# Patient Record
Sex: FEMALE | Race: BLACK OR AFRICAN AMERICAN | NOT HISPANIC OR LATINO | Employment: OTHER | URBAN - METROPOLITAN AREA
[De-identification: names, ages, dates, MRNs, and addresses within clinical notes are randomized per-mention and may not be internally consistent; named-entity substitution may affect disease eponyms.]

---

## 2017-01-13 ENCOUNTER — OFFICE VISIT (OUTPATIENT)
Dept: INTERNAL MEDICINE | Facility: CLINIC | Age: 57
End: 2017-01-13

## 2017-01-13 VITALS
BODY MASS INDEX: 21.44 KG/M2 | HEART RATE: 105 BPM | HEIGHT: 68 IN | RESPIRATION RATE: 24 BRPM | TEMPERATURE: 97.8 F | WEIGHT: 141.5 LBS | DIASTOLIC BLOOD PRESSURE: 80 MMHG | OXYGEN SATURATION: 99 % | SYSTOLIC BLOOD PRESSURE: 138 MMHG

## 2017-01-13 DIAGNOSIS — M54.5 ACUTE MIDLINE LOW BACK PAIN, WITH SCIATICA PRESENCE UNSPECIFIED: Primary | ICD-10-CM

## 2017-01-13 PROCEDURE — 99213 OFFICE O/P EST LOW 20 MIN: CPT | Performed by: PHYSICIAN ASSISTANT

## 2017-01-13 PROCEDURE — 96372 THER/PROPH/DIAG INJ SC/IM: CPT | Performed by: PHYSICIAN ASSISTANT

## 2017-01-13 RX ORDER — LIDOCAINE AND PRILOCAINE 25; 25 MG/G; MG/G
CREAM TOPICAL
COMMUNITY
Start: 2016-12-29 | End: 2019-11-03 | Stop reason: HOSPADM

## 2017-01-13 RX ORDER — KETOROLAC TROMETHAMINE 30 MG/ML
60 INJECTION, SOLUTION INTRAMUSCULAR; INTRAVENOUS ONCE
Status: COMPLETED | OUTPATIENT
Start: 2017-01-13 | End: 2017-01-13

## 2017-01-13 RX ORDER — METHYLPREDNISOLONE 4 MG/1
TABLET ORAL
Qty: 21 TABLET | Refills: 0 | Status: SHIPPED | OUTPATIENT
Start: 2017-01-13 | End: 2019-05-31 | Stop reason: HOSPADM

## 2017-01-13 RX ORDER — TRAMADOL HYDROCHLORIDE 50 MG/1
50 TABLET ORAL EVERY 8 HOURS PRN
Qty: 45 TABLET | Refills: 0 | Status: SHIPPED | OUTPATIENT
Start: 2017-01-13 | End: 2019-05-31 | Stop reason: HOSPADM

## 2017-01-13 RX ORDER — CYCLOBENZAPRINE HCL 10 MG
10 TABLET ORAL 3 TIMES DAILY PRN
Qty: 30 TABLET | Refills: 2 | Status: SHIPPED | OUTPATIENT
Start: 2017-01-13 | End: 2019-05-31 | Stop reason: HOSPADM

## 2017-01-13 RX ADMIN — KETOROLAC TROMETHAMINE 60 MG: 30 INJECTION, SOLUTION INTRAMUSCULAR; INTRAVENOUS at 12:07

## 2017-01-13 NOTE — PROGRESS NOTES
Subjective   Thelma Michael is a 56 y.o. female.   Chief Complaint   Patient presents with   • Back Pain     States she started having pain Tuesday night        History of Present Illness   Several days ago she bent over and heard a pop in her back and after that she cannot straighten up.  Hard to even sit.  SHe prefers to bend forward to avoid the pain.  She has been using flexeril and haven't helped much.  No NSAIDs used.  NO change in bowel or bladder function.    Still abstaining from smoking.    The following portions of the patient's history were reviewed and updated as appropriate: allergies, current medications and problem list.    Review of Systems   Genitourinary: Positive for urgency. Negative for difficulty urinating.   Musculoskeletal: Positive for back pain.   Psychiatric/Behavioral: Positive for sleep disturbance.       Objective   Physical Exam   Constitutional: She appears well-developed and well-nourished.   HENT:   Head: Normocephalic and atraumatic.   Right Ear: External ear normal.   Left Ear: External ear normal.   Eyes: Conjunctivae are normal.   Cardiovascular: Normal rate, regular rhythm and normal heart sounds.  Exam reveals no gallop and no friction rub.    No murmur heard.  Pulmonary/Chest: Effort normal and breath sounds normal.   Psychiatric: She has a normal mood and affect.   Vitals reviewed.    MInimal exam was done d/t to pt's guarding of back at this time.  Assessment/Plan   Thelma was seen today for back pain.    Diagnoses and all orders for this visit:    Acute midline low back pain, with sciatica presence unspecified  -     MethylPREDNISolone (MEDROL, SAMANTHA,) 4 MG tablet; Take as directed on package instructions.  -     ketorolac (TORADOL) injection 60 mg; Inject 60 mg into the shoulder, thigh, or buttocks 1 (One) Time.  -     cyclobenzaprine (FLEXERIL) 10 MG tablet; Take 1 tablet by mouth 3 (Three) Times a Day As Needed for muscle spasms.  -     Ambulatory Referral to Physical  Therapy Evaluate and treat        She can take 2 extra units of lantus at bedtime when on steroid pack.

## 2017-01-13 NOTE — MR AVS SNAPSHOT
Thelma Michael   1/13/2017 11:15 AM   Office Visit    Provider:  JAMESON Phillips   Department:  [facility] MEDIC[facility]   Dept Phone:  [phone]                Your Full Care Plan              Today's Medication Changes          These changes are accurate as of: 1/13/17 12:25 PM.  If you have any questions, ask your nurse or doctor.               New Medication(s)Ordered:     MethylPREDNISolone 4 MG tablet   Commonly known as:  MEDROL (SAMANTHA)   Take as directed on package instructions.   Started by:  JAMESON Phillips       traMADol 50 MG tablet   Commonly known as:  ULTRAM   Take 1 tablet by mouth Every 8 (Eight) Hours As Needed for severe pain (7-10).   Started by:  JAMESON Phillips         Medication(s)that have changed:     esomeprazole 40 MG capsule   Commonly known as:  nexIUM   Take 1 capsule by mouth daily.   What changed:  Another medication with the same name was removed. Continue taking this medication, and follow the directions you see here.   Changed by:  Leif Devlin         Stop taking medication(s)listed here:     PredniSONE 5 MG tablet therapy pack dosepak   Stopped by:  JAMESON Phillips                Where to Get Your Medications      These medications were sent to [facility] [address] [place] [address] [phone]  - [phone] [place] [address]     Phone:  [phone]     cyclobenzaprine 10 MG tablet    MethylPREDNISolone 4 MG tablet         You can get these medications from any pharmacy     Bring a paper prescription for each of these medications     traMADol 50 MG tablet                  Your Updated Medication List          This list is accurate as of: 1/13/17 12:25 PM.  Always use your most recent med list.                amLODIPine 10 MG tablet   Commonly known as:  NORVASC   Take 1 tablet by mouth Daily.       aspirin 325 MG tablet       BD PEN NEEDLE  TOOTIE U/F 32G X 4 MM misc   Generic drug:  Insulin Pen Needle   USE ONE NEEDLE WITH INSULIN 4 TIMES DAILY       CALCIUM PO       cyclobenzaprine 10 MG tablet   Commonly known as:  FLEXERIL   Take 1 tablet by mouth 3 (Three) Times a Day As Needed for muscle spasms.       DEBROX 6.5 % otic solution   Generic drug:  carbamide peroxide       esomeprazole 40 MG capsule   Commonly known as:  nexIUM       FREESTYLE FREEDOM LITE W/DEVICE kit       freestyle lancets       FREESTYLE LITE test strip   Generic drug:  glucose blood       gabapentin 300 MG capsule   Commonly known as:  NEURONTIN   TAKE ONE CAPSULE BY MOUTH ONCE DAILY       glipiZIDE 5 MG ER tablet   Commonly known as:  GLUCOTROL   Take 1 tablet by mouth Daily. as directed; For: Diabetic peripheral neuropathy       GLUCAGON EMERGENCY 1 MG injection   Generic drug:  glucagon       hydrochlorothiazide 12.5 MG capsule   Commonly known as:  MICROZIDE   Take 1 capsule by mouth Daily.       isosorbide mononitrate 60 MG 24 hr tablet   Commonly known as:  IMDUR   Take 1 tablet by mouth Daily.       LANTUS SOLOSTAR 100 UNIT/ML injection pen   Generic drug:  Insulin Glargine       lidocaine-prilocaine 2.5-2.5 % cream   Commonly known as:  EMLA       lisinopril 40 MG tablet   Commonly known as:  PRINIVIL,ZESTRIL   Take 1 tablet by mouth Daily.       MethylPREDNISolone 4 MG tablet   Commonly known as:  MEDROL (SAMANTHA)   Take as directed on package instructions.       metoprolol tartrate 50 MG tablet   Commonly known as:  LOPRESSOR       MUCINEX 600 MG 12 hr tablet   Generic drug:  guaiFENesin       pravastatin 20 MG tablet   Commonly known as:  PRAVACHOL       PRENATAL PO       traMADol 50 MG tablet   Commonly known as:  ULTRAM   Take 1 tablet by mouth Every 8 (Eight) Hours As Needed for severe pain (7-10).       triamcinolone 0.1 % cream   Commonly known as:  KENALOG       VENTOLIN  (90 BASE) MCG/ACT inhaler   Generic drug:  albuterol   INHALE ONE TO TWO PUFFS BY MOUTH  "EVERY 4 TO 6 HOURS AS NEEDED AS DIRECTED       VITAMIN B12 PO       VITAMIN C PO       vitamin D 91515 UNITS capsule capsule   Commonly known as:  ERGOCALCIFEROL   Take 1 capsule by mouth Every 7 (Seven) Days.               We Performed the Following     Ambulatory Referral to Physical Therapy Evaluate and treat       You Were Diagnosed With        Codes Comments    Acute midline low back pain, with sciatica presence unspecified    -  Primary ICD-10-CM: M54.5  ICD-9-CM: 724.2       Medications to be Given to You by a Medical Professional     Due       Frequency    (none) ketorolac (TORADOL) injection 60 mg  Once      Instructions    Take 2 extra units of lantus while on the steroid pack.     Patient Instructions History      [employer] Signup     [facility] [employer] allows you to send messages to your doctor, view your test results, renew your prescriptions, schedule appointments, and more. To sign up, go to [employer] and click on the Sign Up Now link in the New User? box. Enter your [employer] Activation Code exactly as it appears below along with the last four digits of your Social Security Number and your Date of Birth () to complete the sign-up process. If you do not sign up before the expiration date, you must request a new code.    [employer] Activation Code: [email or web address]  Expires: 2017 12:24 PM    If you have questions, you can email [email or web address] or call [phone] to talk to our [employer] staff. Remember, [employer] is NOT to be used for urgent needs. For medical emergencies, dial 911.               Other Info from Your Visit           Allergies     No Known Allergies      Reason for Visit     Back Pain States she started having pain Tuesday night       Vital Signs     Blood Pressure Pulse Temperature Respirations Height Weight    138/80 (BP Location: Left arm, Patient Position: Sitting) 105 97.8 °F (36.6 °C) (Temporal Artery ) 24 68\" (172.7 cm) 141 lb 8 oz (64.2 kg)    " Oxygen Saturation Body Mass Index Smoking Status             99% 21.52 kg/m2 Former Smoker         Problems and Diagnoses Noted     Back pain      Medications Administered     ketorolac (TORADOL) injection 60 mg

## 2017-05-22 RX ORDER — BLOOD-GLUCOSE METER
KIT MISCELLANEOUS
Qty: 100 EACH | Refills: 0 | Status: SHIPPED | OUTPATIENT
Start: 2017-05-22 | End: 2019-06-07 | Stop reason: SDUPTHER

## 2017-05-22 RX ORDER — ERGOCALCIFEROL 1.25 MG/1
CAPSULE ORAL
Qty: 12 CAPSULE | Refills: 0 | Status: SHIPPED | OUTPATIENT
Start: 2017-05-22 | End: 2019-05-31 | Stop reason: HOSPADM

## 2017-05-22 RX ORDER — LANCETS 28 GAUGE
EACH MISCELLANEOUS
Qty: 100 EACH | Refills: 0 | Status: SHIPPED | OUTPATIENT
Start: 2017-05-22 | End: 2019-06-21

## 2017-11-12 ENCOUNTER — HOSPITAL ENCOUNTER (EMERGENCY)
Facility: HOSPITAL | Age: 57
Discharge: HOME | End: 2017-11-12
Attending: EMERGENCY MEDICINE | Admitting: EMERGENCY MEDICINE

## 2017-11-12 VITALS
SYSTOLIC BLOOD PRESSURE: 157 MMHG | WEIGHT: 134 LBS | DIASTOLIC BLOOD PRESSURE: 82 MMHG | BODY MASS INDEX: 20.31 KG/M2 | HEART RATE: 71 BPM | TEMPERATURE: 98.3 F | HEIGHT: 68 IN | RESPIRATION RATE: 16 BRPM | OXYGEN SATURATION: 99 %

## 2017-11-12 DIAGNOSIS — R11.2 NON-INTRACTABLE VOMITING WITH NAUSEA, UNSPECIFIED VOMITING TYPE: ICD-10-CM

## 2017-11-12 DIAGNOSIS — Z79.4 TYPE 2 DIABETES MELLITUS WITH HYPERGLYCEMIA, WITH LONG-TERM CURRENT USE OF INSULIN (HCC): Primary | ICD-10-CM

## 2017-11-12 DIAGNOSIS — E11.65 TYPE 2 DIABETES MELLITUS WITH HYPERGLYCEMIA, WITH LONG-TERM CURRENT USE OF INSULIN (HCC): Primary | ICD-10-CM

## 2017-11-12 DIAGNOSIS — I10 UNCONTROLLED HYPERTENSION: ICD-10-CM

## 2017-11-12 LAB
ANION GAP SERPL CALCULATED.3IONS-SCNC: 5 MMOL/L (ref 3–11)
BASOPHILS # BLD AUTO: 0.01 10*3/MM3 (ref 0–0.2)
BASOPHILS NFR BLD AUTO: 0.2 % (ref 0–1)
BILIRUB UR QL STRIP: NEGATIVE
BUN BLD-MCNC: 12 MG/DL (ref 9–23)
BUN/CREAT SERPL: 13.3 (ref 7–25)
CALCIUM SPEC-SCNC: 9.3 MG/DL (ref 8.7–10.4)
CHLORIDE SERPL-SCNC: 106 MMOL/L (ref 99–109)
CLARITY UR: CLEAR
CO2 SERPL-SCNC: 28 MMOL/L (ref 20–31)
COLOR UR: YELLOW
CREAT BLD-MCNC: 0.9 MG/DL (ref 0.6–1.3)
DEPRECATED RDW RBC AUTO: 43.2 FL (ref 37–54)
EOSINOPHIL # BLD AUTO: 0.05 10*3/MM3 (ref 0–0.3)
EOSINOPHIL NFR BLD AUTO: 0.8 % (ref 0–3)
ERYTHROCYTE [DISTWIDTH] IN BLOOD BY AUTOMATED COUNT: 13.1 % (ref 11.3–14.5)
GFR SERPL CREATININE-BSD FRML MDRD: 78 ML/MIN/1.73
GLUCOSE BLD-MCNC: 409 MG/DL (ref 70–100)
GLUCOSE BLDC GLUCOMTR-MCNC: 417 MG/DL (ref 70–130)
GLUCOSE BLDC GLUCOMTR-MCNC: 75 MG/DL (ref 70–130)
GLUCOSE UR STRIP-MCNC: ABNORMAL MG/DL
HBA1C MFR BLD: 10.5 % (ref 4.8–5.6)
HCT VFR BLD AUTO: 38.6 % (ref 34.5–44)
HGB BLD-MCNC: 13.6 G/DL (ref 11.5–15.5)
HGB UR QL STRIP.AUTO: NEGATIVE
IMM GRANULOCYTES # BLD: 0.01 10*3/MM3 (ref 0–0.03)
IMM GRANULOCYTES NFR BLD: 0.2 % (ref 0–0.6)
KETONES UR QL STRIP: NEGATIVE
LEUKOCYTE ESTERASE UR QL STRIP.AUTO: NEGATIVE
LYMPHOCYTES # BLD AUTO: 2.01 10*3/MM3 (ref 0.6–4.8)
LYMPHOCYTES NFR BLD AUTO: 31.9 % (ref 24–44)
MCH RBC QN AUTO: 31.8 PG (ref 27–31)
MCHC RBC AUTO-ENTMCNC: 35.2 G/DL (ref 32–36)
MCV RBC AUTO: 90.2 FL (ref 80–99)
MONOCYTES # BLD AUTO: 0.36 10*3/MM3 (ref 0–1)
MONOCYTES NFR BLD AUTO: 5.7 % (ref 0–12)
NEUTROPHILS # BLD AUTO: 3.87 10*3/MM3 (ref 1.5–8.3)
NEUTROPHILS NFR BLD AUTO: 61.2 % (ref 41–71)
NITRITE UR QL STRIP: NEGATIVE
PH UR STRIP.AUTO: 8 [PH] (ref 5–8)
PLATELET # BLD AUTO: 261 10*3/MM3 (ref 150–450)
PMV BLD AUTO: 11.2 FL (ref 6–12)
POTASSIUM BLD-SCNC: 4.1 MMOL/L (ref 3.5–5.5)
PROT UR QL STRIP: NEGATIVE
RBC # BLD AUTO: 4.28 10*6/MM3 (ref 3.89–5.14)
SODIUM BLD-SCNC: 139 MMOL/L (ref 132–146)
SP GR UR STRIP: 1.02 (ref 1–1.03)
T4 FREE SERPL-MCNC: 1.13 NG/DL (ref 0.89–1.76)
TROPONIN I SERPL-MCNC: 0 NG/ML (ref 0–0.07)
TSH SERPL DL<=0.05 MIU/L-ACNC: 0.98 MIU/ML (ref 0.35–5.35)
UROBILINOGEN UR QL STRIP: ABNORMAL
WBC NRBC COR # BLD: 6.31 10*3/MM3 (ref 3.5–10.8)

## 2017-11-12 PROCEDURE — 80048 BASIC METABOLIC PNL TOTAL CA: CPT | Performed by: EMERGENCY MEDICINE

## 2017-11-12 PROCEDURE — 96374 THER/PROPH/DIAG INJ IV PUSH: CPT

## 2017-11-12 PROCEDURE — 84443 ASSAY THYROID STIM HORMONE: CPT | Performed by: EMERGENCY MEDICINE

## 2017-11-12 PROCEDURE — 96361 HYDRATE IV INFUSION ADD-ON: CPT

## 2017-11-12 PROCEDURE — 99284 EMERGENCY DEPT VISIT MOD MDM: CPT

## 2017-11-12 PROCEDURE — 83036 HEMOGLOBIN GLYCOSYLATED A1C: CPT | Performed by: EMERGENCY MEDICINE

## 2017-11-12 PROCEDURE — 84484 ASSAY OF TROPONIN QUANT: CPT

## 2017-11-12 PROCEDURE — 93005 ELECTROCARDIOGRAM TRACING: CPT | Performed by: EMERGENCY MEDICINE

## 2017-11-12 PROCEDURE — 85025 COMPLETE CBC W/AUTO DIFF WBC: CPT | Performed by: EMERGENCY MEDICINE

## 2017-11-12 PROCEDURE — 82962 GLUCOSE BLOOD TEST: CPT

## 2017-11-12 PROCEDURE — 81003 URINALYSIS AUTO W/O SCOPE: CPT | Performed by: EMERGENCY MEDICINE

## 2017-11-12 PROCEDURE — 63710000001 INSULIN REGULAR HUMAN PER 5 UNITS: Performed by: EMERGENCY MEDICINE

## 2017-11-12 PROCEDURE — 84439 ASSAY OF FREE THYROXINE: CPT | Performed by: EMERGENCY MEDICINE

## 2017-11-12 RX ORDER — ONDANSETRON 4 MG/1
4 TABLET, ORALLY DISINTEGRATING ORAL EVERY 6 HOURS PRN
Qty: 15 TABLET | Refills: 0 | Status: SHIPPED | OUTPATIENT
Start: 2017-11-12 | End: 2019-05-31 | Stop reason: HOSPADM

## 2017-11-12 RX ORDER — DILTIAZEM HYDROCHLORIDE 5 MG/ML
10 INJECTION INTRAVENOUS ONCE
Status: COMPLETED | OUTPATIENT
Start: 2017-11-12 | End: 2017-11-12

## 2017-11-12 RX ADMIN — DILTIAZEM HYDROCHLORIDE 10 MG: 5 INJECTION INTRAVENOUS at 16:14

## 2017-11-12 RX ADMIN — SODIUM CHLORIDE 1000 ML: 9 INJECTION, SOLUTION INTRAVENOUS at 14:54

## 2017-11-12 RX ADMIN — INSULIN HUMAN 6 UNITS: 100 INJECTION, SOLUTION PARENTERAL at 15:12

## 2017-11-12 NOTE — ED PROVIDER NOTES
Subjective   HPI Comments: Pt presents with hyperglycemia.  She has been feeling unwell for about 2 weeks, with malaise, frequent urination and mild intermittent nausea and vomiting.  She says during this time she was checking her sugars as normal and they did not go above the 140s.  Today after lunch she developed fatigue, headache, left arm heaviness and had some N/V and checked her sugar and found it to be much higher.  Her BP is higher than usual for her as well.    She denies fever, chest pain, dyspnea, abd pain, numbness, tingling, weakness.  She reports compliance with her insulin regimen and diet, both of which are longstanding and unchanged.    Patient is a 56 y.o. female presenting with hyperglycemia.   History provided by:  Patient  Hyperglycemia   Blood sugar level PTA:  400s  Severity:  Moderate  Onset quality:  Gradual  Timing:  Constant  Progression:  Unchanged  Chronicity:  New  Diabetes status:  Controlled with insulin  Current diabetic therapy:  Insulin  Context: recent illness    Context: not change in medication, not insulin pump use, not new diabetes diagnosis, not noncompliance and not recent change in diet    Relieved by:  Nothing  Ineffective treatments:  None tried  Associated symptoms: nausea, polyuria and vomiting    Associated symptoms: no abdominal pain, no chest pain, no confusion, no dizziness, no dysuria, no fever and no shortness of breath        Review of Systems   Constitutional: Negative for fever.   Respiratory: Negative for shortness of breath.    Cardiovascular: Negative for chest pain.   Gastrointestinal: Positive for nausea and vomiting. Negative for abdominal pain.   Endocrine: Positive for polyuria.   Genitourinary: Negative for dysuria.   Neurological: Negative for dizziness.   Psychiatric/Behavioral: Negative for confusion.   All other systems reviewed and are negative.      Past Medical History:   Diagnosis Date   • Acid reflux    • Acute bronchitis    • Cardiac murmur     • H/O echocardiogram 08/07/2012    i. LVEF 65%.ii. Mild LVH.iii. Borderline evidence of atrial septal aneurysm.  No PFO.    • History of nuclear stress test 08/22/2014    Negative for ischemia and scars; LVEF 77%.     • Hyperlipidemia    • Hypertension    • Impacted cerumen of both ears    • Migraine    • Sinusitis    • Tobacco abuse     quit 4 days ago.     • Urticaria        No Known Allergies    Past Surgical History:   Procedure Laterality Date   • NO PAST SURGERIES         Family History   Problem Relation Age of Onset   • Anxiety disorder Other    • Arthritis Other    • ADD / ADHD Other    • Heart disease Other      cardiac disorder   • Depression Other    • Diabetes Other    • Hyperlipidemia Other    • Hypertension Other    • Lung cancer Other    • Osteoporosis Other        Social History     Social History   • Marital status:      Spouse name: N/A   • Number of children: N/A   • Years of education: N/A     Social History Main Topics   • Smoking status: Former Smoker     Quit date: 1/13/2015   • Smokeless tobacco: Never Used      Comment: [facility] never smoker    • Alcohol use 0.6 oz/week     1 Glasses of wine per week      Comment: ocassional   • Drug use: None   • Sexual activity: Not Asked      Comment: Single      Other Topics Concern   • None     Social History Narrative           Objective   Physical Exam   Constitutional: She appears well-developed and well-nourished. She is cooperative.  Non-toxic appearance. No distress.   HENT:   Head: Normocephalic and atraumatic.   Mouth/Throat: Oropharynx is clear and moist and mucous membranes are normal.   Eyes: Conjunctivae and EOM are normal. No scleral icterus.   Neck: Normal range of motion. Neck supple.   Cardiovascular: Normal rate, regular rhythm and normal heart sounds.    No murmur heard.  Pulmonary/Chest: Effort normal and breath sounds normal. No respiratory distress. She has no wheezes. She has no rhonchi. She has no rales.   Abdominal: Soft.  Bowel sounds are normal. There is no tenderness. There is no rebound and no guarding.   Musculoskeletal: Normal range of motion.   Neurological: She is alert. She has normal strength.   Skin: Skin is warm and dry. No rash noted.   Psychiatric: She has a normal mood and affect. Her speech is normal and behavior is normal.   Nursing note and vitals reviewed.      Procedures         ED Course  ED Course      BP, sugar improved in the ED.  Pt feels better with meds.  Patient stable on serial rechecks.  Discussed findings, concerns, plan of care, expected course, reasons to return and followup.              MDM  Number of Diagnoses or Management Options      Final diagnoses:   Type 2 diabetes mellitus with hyperglycemia, with long-term current use of insulin   Uncontrolled hypertension   Non-intractable vomiting with nausea, unspecified vomiting type            Jimmie Urbina MD  11/16/17 1002

## 2018-01-23 ENCOUNTER — TELEPHONE (OUTPATIENT)
Dept: INTERNAL MEDICINE | Facility: CLINIC | Age: 58
End: 2018-01-23

## 2018-01-23 NOTE — TELEPHONE ENCOUNTER
LMOV @ 9:15 TO RETURN CALL. SHE CAN ESTABLISH CARE WITH DALI QUIROS. SHE WILL NEED TO BE SEEN FIRST BEFORE WE CAN RX MEDICATIONS. NO SHOW POLICY NEEDS TO BE REVIEWED WITH HER AS WELL

## 2018-01-23 NOTE — TELEPHONE ENCOUNTER
PATIENT WOULD LIKE REFILLS ON ALL MAINTENANCE MEDICATIONS, PATIENT HAS BEEN VERBALLY TOLD THAT SHE WILL NOT BE PRESCRIBED IN CONTROLLED SUBSTANCES. PATIENT WOULD LIKE A CALL BACK AS SOON AS POSSIBLE. PATIENT SAYS SHE HAS NOT BEEN SEEN FOR A WHILE DUE TO NO INSURANCE COVERAGE. THANK YOU      PATIENT DOES NOT HAVE NEW PCP SET UP BUT IS WAITING UNTIL A NEW PROVIDER IN [facility] IS COMING.

## 2018-11-14 ENCOUNTER — HOSPITAL ENCOUNTER (EMERGENCY)
Facility: HOSPITAL | Age: 58
Discharge: HOME | End: 2018-11-14
Attending: EMERGENCY MEDICINE | Admitting: EMERGENCY MEDICINE

## 2018-11-14 ENCOUNTER — APPOINTMENT (OUTPATIENT)
Dept: GENERAL RADIOLOGY | Facility: HOSPITAL | Age: 58
End: 2018-11-14

## 2018-11-14 VITALS
DIASTOLIC BLOOD PRESSURE: 91 MMHG | BODY MASS INDEX: 18.79 KG/M2 | TEMPERATURE: 98.6 F | SYSTOLIC BLOOD PRESSURE: 190 MMHG | RESPIRATION RATE: 18 BRPM | WEIGHT: 124 LBS | HEIGHT: 68 IN | OXYGEN SATURATION: 97 % | HEART RATE: 74 BPM
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
## 2018-11-14 DIAGNOSIS — I10 CHRONIC HYPERTENSION: ICD-10-CM

## 2018-11-14 DIAGNOSIS — S70.02XA CONTUSION OF LEFT HIP, INITIAL ENCOUNTER: ICD-10-CM

## 2018-11-14 DIAGNOSIS — I16.0 HYPERTENSIVE URGENCY: ICD-10-CM

## 2018-11-14 PROCEDURE — 73502 X-RAY EXAM HIP UNI 2-3 VIEWS: CPT

## 2018-11-14 PROCEDURE — 99283 EMERGENCY DEPT VISIT LOW MDM: CPT

## 2018-11-14 RX ORDER — LISINOPRIL 10 MG/1
10 TABLET ORAL DAILY
Qty: 30 TABLET | Refills: 0 | Status: SHIPPED | OUTPATIENT
Start: 2018-11-14 | End: 2018-11-23 | Stop reason: SDUPTHER

## 2018-11-14 RX ORDER — CLONIDINE HYDROCHLORIDE 0.1 MG/1
0.1 TABLET ORAL ONCE
Status: COMPLETED | OUTPATIENT
Start: 2018-11-14 | End: 2018-11-14

## 2018-11-14 RX ADMIN — CLONIDINE HYDROCHLORIDE 0.1 MG: 0.1 TABLET ORAL at 18:47

## 2018-11-14 NOTE — ED PROVIDER NOTES
Subjective   Ms. Thelma Michael is a 57 y.o. female who presents to the ED with c/o L hip pain s/p fall 2 days ago. Pt reports 2 days she tripped up stairs walking into her home and fell with point of impact being L hip and L lateral thigh pain. Since injury the pain has remained moderate but is constant and exacerbates with ambulation. 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 She denies decreased ROM, LOC, head injury, back pain, abd pain, and any other acute sx at this time.        History provided by:  Patient  Fall   Mechanism of injury: fall    Injury location:  Leg  Leg injury location:  L upper leg and L hip  Incident location:  Home  Time since incident:  2 days  Fall:     Fall occurred:  Tripped    Point of impact: L hip, L lateral thigh   Prior to arrival data:     Loss of consciousness: no    Associated symptoms: no abdominal pain, no back pain and no loss of consciousness        Review of Systems   Gastrointestinal: Negative for abdominal pain.   Musculoskeletal: Positive for arthralgias (L hip, L lateral thigh). Negative for back pain and gait problem.   Neurological: Negative for loss of consciousness.   All other systems reviewed and are negative.      Past Medical History:   Diagnosis Date   • Acid reflux    • Acute bronchitis    • Cardiac murmur    • Diabetes mellitus (CMS/HCC)    • H/O echocardiogram 08/07/2012    i. LVEF 65%.ii. Mild LVH.iii. Borderline evidence of atrial septal aneurysm.  No PFO.    • History of nuclear stress test 08/22/2014    Negative for ischemia and scars; LVEF 77%.     • Hyperlipidemia    • Hypertension    • Impacted cerumen of both ears    • Migraine    • Sinusitis    • Tobacco abuse     quit 4 days ago.     • Urticaria        No Known Allergies    Past Surgical History:   Procedure Laterality Date   • NO PAST SURGERIES         Family History   Problem Relation Age of Onset   • Anxiety disorder Other    • Arthritis Other    • ADD  / ADHD Other    • Heart disease Other         cardiac disorder   • Depression Other    • Diabetes Other    • Hyperlipidemia Other    • Hypertension Other    • Lung cancer Other    • Osteoporosis Other        Social History     Socioeconomic History   • Marital status:      Spouse name: Not on file   • Number of children: Not on file   • Years of education: Not on file   • Highest education level: Not on file   Tobacco Use   • Smoking status: Former Smoker     Last attempt to quit: 1/13/2015     Years since quitting: 3.8   • Smokeless tobacco: Never Used   • Tobacco comment: [facility] never smoker    Substance and Sexual Activity   • Alcohol use: Yes     Alcohol/week: 0.6 oz     Types: 1 Glasses of wine per week     Comment: ocassional         Objective   Physical Exam   Constitutional: She is oriented to person, place, and time. She appears well-developed and well-nourished. No distress.   HENT:   Head: Normocephalic and atraumatic.   Right Ear: External ear normal.   Left Ear: External ear normal.   Nose: Nose normal.   Eyes: Conjunctivae are normal. No scleral icterus.   Neck: Normal range of motion. Neck supple.   Pulmonary/Chest: Effort normal. No respiratory distress.   Musculoskeletal: Normal range of motion. She exhibits no edema.        Left hip: She exhibits normal range of motion, normal strength and no swelling.        Left knee: She exhibits normal range of motion. No tenderness found.        Left upper leg: She exhibits tenderness. She exhibits no swelling.   Tenderness to palpation of anterior, superior iliac spine. Full ROM of L hip with no pain. Mild tenderness to palpation of lateral aspect of mid-left thigh. No contusions or swelling appreciated. Pt is fully ambulatory. No concern for femur fracture or dislocation. No tenderness to palpation of L knee   Neurological: She is alert and oriented to person, place, and time.   Neurovascularly intact   Skin: Skin is warm and dry.   Psychiatric: She  "has a normal mood and affect. Her behavior is normal.   Nursing note and vitals reviewed.      Procedures         ED Course  ED Course as of Nov 14 2334   Wed Nov 14, 2018   1753 Pt states that she is working on getting insurance coverage through [employer].  She also states that she ran out of her blood pressure medications several months ago, which we are incidentally seeing today.  As patient was on multiple hypertensive medications in the past I will restart her on her lisinopril.  I have instructed her that she will likely need more aggressive management but this is most appropriately done through primary care office.  She states that she does have a primary care physician and is confident that she will have her well care reinstated and she will follow up promptly with them.  [CP]      ED Course User Index  [CP] Abisai Pierson, DO     No results found for this or any previous visit (from the past 24 hour(s)).  Note: In addition to lab results from this visit, the labs listed above may include labs taken at another facility or during a different encounter within the last 24 hours. Please correlate lab times with ED admission and discharge times for further clarification of the services performed during this visit.    XR Hip With or Without Pelvis 2 - 3 View Left   Final Result      Unremarkable radiographic series of the left hip.      THIS DOCUMENT HAS BEEN ELECTRONICALLY SIGNED BY CHANCE DUTTON MD        Vitals:    11/14/18 1642 11/14/18 1847 11/14/18 1921   BP: (!) 207/96 (!) 207/96 (!) 190/91   BP Location: Left arm     Patient Position: Sitting  Sitting   Pulse: 81 81 74   Resp: 18  18   Temp: 98.6 °F (37 °C)     TempSrc: Oral     SpO2: 99%  97%   Weight: 56.2 kg (124 lb)     Height: 172.7 cm (68\")       Medications   CloNIDine (CATAPRES) tablet 0.1 mg (0.1 mg Oral Given 11/14/18 1847)     ECG/EMG Results (last 24 hours)     ** No results found for the last 24 hours. **                        MDM    Final " diagnoses:   Fall on stairs, initial encounter   Contusion of left hip, initial encounter   Chronic hypertension   Hypertensive urgency       Documentation assistance provided by mylene Em.  Information recorded by the scribe was done at my direction and has been verified and validated by me.     Twin Em  11/14/18 1344       Abisai Pierson DO  11/14/18 3892

## 2018-11-15 NOTE — DISCHARGE INSTRUCTIONS
Given your history and currently hypertension, it is extremely important to establish care with her primary care physician.

## 2018-11-23 ENCOUNTER — HOSPITAL ENCOUNTER (EMERGENCY)
Facility: HOSPITAL | Age: 58
Discharge: HOME | End: 2018-11-23
Attending: EMERGENCY MEDICINE | Admitting: EMERGENCY MEDICINE

## 2018-11-23 VITALS
WEIGHT: 134 LBS | BODY MASS INDEX: 20.31 KG/M2 | HEIGHT: 68 IN | DIASTOLIC BLOOD PRESSURE: 104 MMHG | OXYGEN SATURATION: 98 % | HEART RATE: 70 BPM | TEMPERATURE: 97.6 F | SYSTOLIC BLOOD PRESSURE: 182 MMHG | RESPIRATION RATE: 16 BRPM

## 2018-11-23 DIAGNOSIS — I10 UNCONTROLLED HYPERTENSION: Primary | ICD-10-CM

## 2018-11-23 LAB
BUN BLDA-MCNC: 12 MG/DL (ref 8–26)
CA-I BLDA-SCNC: 0.95 MMOL/L (ref 1.2–1.32)
CHLORIDE BLDA-SCNC: 109 MMOL/L (ref 98–109)
CO2 BLDA-SCNC: 19 MMOL/L (ref 24–29)
CREAT BLDA-MCNC: 0.4 MG/DL (ref 0.6–1.3)
GLUCOSE BLDC GLUCOMTR-MCNC: 321 MG/DL (ref 70–130)
HCT VFR BLDA CALC: 26 % (ref 38–51)
HGB BLDA-MCNC: 8.8 G/DL (ref 12–17)
POTASSIUM BLDA-SCNC: 2.6 MMOL/L (ref 3.5–4.9)
SODIUM BLDA-SCNC: 143 MMOL/L (ref 138–146)

## 2018-11-23 PROCEDURE — 85014 HEMATOCRIT: CPT

## 2018-11-23 PROCEDURE — 99284 EMERGENCY DEPT VISIT MOD MDM: CPT

## 2018-11-23 PROCEDURE — 80047 BASIC METABLC PNL IONIZED CA: CPT

## 2018-11-23 RX ORDER — METOPROLOL TARTRATE 50 MG/1
50 TABLET, FILM COATED ORAL ONCE
Status: COMPLETED | OUTPATIENT
Start: 2018-11-23 | End: 2018-11-23

## 2018-11-23 RX ORDER — LISINOPRIL 10 MG/1
10 TABLET ORAL DAILY
Qty: 30 TABLET | Refills: 0 | Status: SHIPPED | OUTPATIENT
Start: 2018-11-23 | End: 2019-06-07 | Stop reason: SDUPTHER

## 2018-11-23 RX ORDER — METOPROLOL TARTRATE 50 MG/1
50 TABLET, FILM COATED ORAL 2 TIMES DAILY
Qty: 60 TABLET | Refills: 0 | Status: SHIPPED | OUTPATIENT
Start: 2018-11-23 | End: 2019-05-31 | Stop reason: HOSPADM

## 2018-11-23 RX ORDER — LISINOPRIL 10 MG/1
20 TABLET ORAL ONCE
Status: COMPLETED | OUTPATIENT
Start: 2018-11-23 | End: 2018-11-23

## 2018-11-23 RX ORDER — AMLODIPINE BESYLATE 10 MG/1
10 TABLET ORAL DAILY
Qty: 30 TABLET | Refills: 0 | Status: SHIPPED | OUTPATIENT
Start: 2018-11-23 | End: 2019-06-17 | Stop reason: SINTOL

## 2018-11-23 RX ADMIN — LISINOPRIL 20 MG: 10 TABLET ORAL at 22:40

## 2018-11-23 RX ADMIN — METOPROLOL TARTRATE 50 MG: 50 TABLET ORAL at 22:41

## 2018-11-24 NOTE — ED PROVIDER NOTES
Subjective   Thelma Michael is a 57 y.o.female who presents to the emergency department with complaints of elevated blood pressure readings and recurrent headaches over the last couple of weeks. The patient has been diagnosed with hypertension but states that she has not been able to afford her medications, including several blood pressure medications, due to problems with insurance and has not taken any of them in several months. She has, however, continued to monitor her blood pressure. The patient was prescribed Lisinopril here in the emergency department on 11/14 although she states that she has not been able to fill the prescription yet. She came here for additional evaluation due to persistent symptoms. She denies experiencing any numbness, tingling, weakness, or visual changes. There are no other acute complaints at this time.        History provided by:  Patient  Hypertension   Severity:  Unable to specify  Onset quality:  Unable to specify  Timing:  Constant  Progression:  Unchanged  Chronicity:  Chronic  Time since last dose of antihypertensive: several months.  Context: noncompliance    Relieved by:  None tried  Worsened by:  Nothing  Ineffective treatments:  None tried  Associated symptoms: headaches    Associated symptoms: no blurred vision and no weakness        Review of Systems   Eyes: Negative for blurred vision and visual disturbance.   Neurological: Positive for headaches. Negative for weakness and numbness.   All other systems reviewed and are negative.      Past Medical History:   Diagnosis Date   • Acid reflux    • Acute bronchitis    • Cardiac murmur    • Diabetes mellitus (CMS/[facility])    • H/O echocardiogram 08/07/2012    i. LVEF 65%.ii. Mild LVH.iii. Borderline evidence of atrial septal aneurysm.  No PFO.    • History of nuclear stress test 08/22/2014    Negative for ischemia and scars; LVEF 77%.     • Hyperlipidemia    • Hypertension    • Impacted cerumen of both ears    • Migraine    •  Sinusitis    • Tobacco abuse     quit 4 days ago.     • Urticaria        No Known Allergies    Past Surgical History:   Procedure Laterality Date   • DENTAL PROCEDURE     • NO PAST SURGERIES         Family History   Problem Relation Age of Onset   • Anxiety disorder Other    • Arthritis Other    • ADD / ADHD Other    • Heart disease Other         cardiac disorder   • Depression Other    • Diabetes Other    • Hyperlipidemia Other    • Hypertension Other    • Lung cancer Other    • Osteoporosis Other        Social History     Socioeconomic History   • Marital status:      Spouse name: Not on file   • Number of children: Not on file   • Years of education: Not on file   • Highest education level: Not on file   Tobacco Use   • Smoking status: Former Smoker     Last attempt to quit: 1/13/2015     Years since quitting: 3.8   • Smokeless tobacco: Never Used   • Tobacco comment: [facility] never smoker    Substance and Sexual Activity   • Alcohol use: Yes     Alcohol/week: 0.6 oz     Types: 1 Glasses of wine per week     Comment: ocassional         Objective   Physical Exam   Constitutional: She is oriented to person, place, and time. She appears well-developed and well-nourished.   HENT:   Head: Normocephalic and atraumatic.   Eyes: Conjunctivae are normal. No scleral icterus.   Neck: Neck supple.   Cardiovascular: Normal rate.   Pulmonary/Chest: Effort normal.   Musculoskeletal: Normal range of motion.   Neurological: She is alert and oriented to person, place, and time. No cranial nerve deficit. She exhibits normal muscle tone. Coordination normal.   Skin: Skin is warm and dry. No rash noted.   Psychiatric: She has a normal mood and affect. Her behavior is normal. Thought content normal.   Nursing note and vitals reviewed.      Procedures         ED Course     Uncontrolled HTN, off all meds.  Reviewed records, takes 5 BP meds.  Will restart three, aiming for gradual improvement in this longstanding uncontrolled HTN.   She has a place to get her scripts filled tomorrow.  Doses given here in the ED.                MDM  Number of Diagnoses or Management Options  Uncontrolled hypertension:      Amount and/or Complexity of Data Reviewed  Clinical lab tests: ordered and reviewed        Final diagnoses:   Uncontrolled hypertension       Documentation assistance provided by mylene Fierro.  Information recorded by the scribe was done at my direction and has been verified and validated by me.     Gabby Fierro  11/23/18 [id number]       Jimmie Urbina MD  11/24/18 [id number]

## 2018-11-24 NOTE — DISCHARGE INSTRUCTIONS
Follow up with one of the physician centers below to setup primary care.    [facility], [phone], [address], [address], [place] [address]    [facility], [phone], [address], [address] [place] [address], [phone], [address] [place] [address] [address] [place] [id number];     [facility], [phone], [address] [place] [address]

## 2019-05-28 ENCOUNTER — HOSPITAL ENCOUNTER (INPATIENT)
Facility: HOSPITAL | Age: 59
LOS: 2 days | Discharge: HOME | End: 2019-05-31
Attending: EMERGENCY MEDICINE | Admitting: INTERNAL MEDICINE

## 2019-05-28 DIAGNOSIS — E87.6 HYPOKALEMIA: ICD-10-CM

## 2019-05-28 DIAGNOSIS — Z74.09 IMPAIRED FUNCTIONAL MOBILITY, BALANCE, GAIT, AND ENDURANCE: ICD-10-CM

## 2019-05-28 DIAGNOSIS — R73.9 HYPERGLYCEMIA: ICD-10-CM

## 2019-05-28 DIAGNOSIS — R53.1 GENERALIZED WEAKNESS: ICD-10-CM

## 2019-05-28 DIAGNOSIS — R11.2 NON-INTRACTABLE VOMITING WITH NAUSEA, UNSPECIFIED VOMITING TYPE: Primary | ICD-10-CM

## 2019-05-28 DIAGNOSIS — I10 HYPERTENSION, UNSPECIFIED TYPE: ICD-10-CM

## 2019-05-28 LAB — GLUCOSE BLDC GLUCOMTR-MCNC: 344 MG/DL (ref 70–130)

## 2019-05-28 PROCEDURE — 82962 GLUCOSE BLOOD TEST: CPT

## 2019-05-28 PROCEDURE — 82010 KETONE BODYS QUAN: CPT | Performed by: EMERGENCY MEDICINE

## 2019-05-28 PROCEDURE — 99285 EMERGENCY DEPT VISIT HI MDM: CPT

## 2019-05-28 PROCEDURE — 85025 COMPLETE CBC W/AUTO DIFF WBC: CPT | Performed by: EMERGENCY MEDICINE

## 2019-05-28 PROCEDURE — 80053 COMPREHEN METABOLIC PANEL: CPT | Performed by: EMERGENCY MEDICINE

## 2019-05-28 PROCEDURE — 83735 ASSAY OF MAGNESIUM: CPT | Performed by: EMERGENCY MEDICINE

## 2019-05-28 RX ORDER — SODIUM CHLORIDE 0.9 % (FLUSH) 0.9 %
10 SYRINGE (ML) INJECTION AS NEEDED
Status: DISCONTINUED | OUTPATIENT
Start: 2019-05-28 | End: 2019-05-31 | Stop reason: HOSPADM

## 2019-05-29 ENCOUNTER — APPOINTMENT (OUTPATIENT)
Dept: CT IMAGING | Facility: HOSPITAL | Age: 59
End: 2019-05-29

## 2019-05-29 PROBLEM — R11.2 NON-INTRACTABLE VOMITING WITH NAUSEA: Status: ACTIVE | Noted: 2019-05-29

## 2019-05-29 PROBLEM — A41.9 SEPSIS (HCC): Status: ACTIVE | Noted: 2019-05-29

## 2019-05-29 PROBLEM — N39.0 UTI (URINARY TRACT INFECTION): Status: ACTIVE | Noted: 2019-05-29

## 2019-05-29 PROBLEM — E11.10 DKA (DIABETIC KETOACIDOSES): Status: ACTIVE | Noted: 2019-05-29

## 2019-05-29 LAB
ALBUMIN SERPL-MCNC: 4.1 G/DL (ref 3.5–5.2)
ALBUMIN SERPL-MCNC: 4.6 G/DL (ref 3.5–5.2)
ALBUMIN/GLOB SERPL: 1.2 G/DL
ALBUMIN/GLOB SERPL: 1.3 G/DL
ALP SERPL-CCNC: 85 U/L (ref 39–117)
ALP SERPL-CCNC: 98 U/L (ref 39–117)
ALT SERPL W P-5'-P-CCNC: 16 U/L (ref 1–33)
ALT SERPL W P-5'-P-CCNC: 18 U/L (ref 1–33)
ANION GAP SERPL CALCULATED.3IONS-SCNC: 12 MMOL/L
ANION GAP SERPL CALCULATED.3IONS-SCNC: 15 MMOL/L
ANION GAP SERPL CALCULATED.3IONS-SCNC: 18 MMOL/L
ANION GAP SERPL CALCULATED.3IONS-SCNC: 20 MMOL/L
ANION GAP SERPL CALCULATED.3IONS-SCNC: 25 MMOL/L
ARTERIAL PATENCY WRIST A: POSITIVE
AST SERPL-CCNC: 19 U/L (ref 1–32)
AST SERPL-CCNC: 21 U/L (ref 1–32)
ATMOSPHERIC PRESS: ABNORMAL MMHG
B-OH-BUTYR SERPL-SCNC: 3.15 MMOL/L (ref 0.02–0.27)
BACTERIA UR QL AUTO: ABNORMAL /HPF
BASE EXCESS BLDA CALC-SCNC: 1.8 MMOL/L (ref 0–2)
BASOPHILS # BLD AUTO: 0 10*3/MM3 (ref 0–0.2)
BASOPHILS # BLD AUTO: 0.01 10*3/MM3 (ref 0–0.2)
BASOPHILS # BLD AUTO: 0.01 10*3/MM3 (ref 0–0.2)
BASOPHILS NFR BLD AUTO: 0 % (ref 0–1.5)
BASOPHILS NFR BLD AUTO: 0.1 % (ref 0–1.5)
BASOPHILS NFR BLD AUTO: 0.1 % (ref 0–1.5)
BDY SITE: ABNORMAL
BILIRUB SERPL-MCNC: 0.8 MG/DL (ref 0.2–1.2)
BILIRUB SERPL-MCNC: 1.2 MG/DL (ref 0.2–1.2)
BILIRUB UR QL STRIP: NEGATIVE
BODY TEMPERATURE: 37 C
BUN BLD-MCNC: 14 MG/DL (ref 8–23)
BUN BLD-MCNC: 18 MG/DL (ref 8–23)
BUN BLD-MCNC: 20 MG/DL (ref 8–23)
BUN BLD-MCNC: 22 MG/DL (ref 8–23)
BUN BLD-MCNC: 26 MG/DL (ref 8–23)
BUN/CREAT SERPL: 19.7 (ref 7–25)
BUN/CREAT SERPL: 20.7 (ref 7–25)
BUN/CREAT SERPL: 23.3 (ref 7–25)
BUN/CREAT SERPL: 25.9 (ref 7–25)
BUN/CREAT SERPL: 28.3 (ref 7–25)
CALCIUM SPEC-SCNC: 10.3 MG/DL (ref 8.6–10.5)
CALCIUM SPEC-SCNC: 9.1 MG/DL (ref 8.6–10.5)
CALCIUM SPEC-SCNC: 9.2 MG/DL (ref 8.6–10.5)
CALCIUM SPEC-SCNC: 9.3 MG/DL (ref 8.6–10.5)
CALCIUM SPEC-SCNC: 9.4 MG/DL (ref 8.6–10.5)
CHLORIDE SERPL-SCNC: 101 MMOL/L (ref 98–107)
CHLORIDE SERPL-SCNC: 101 MMOL/L (ref 98–107)
CHLORIDE SERPL-SCNC: 104 MMOL/L (ref 98–107)
CHLORIDE SERPL-SCNC: 105 MMOL/L (ref 98–107)
CHLORIDE SERPL-SCNC: 93 MMOL/L (ref 98–107)
CLARITY UR: ABNORMAL
CO2 BLDA-SCNC: 23.5 MMOL/L (ref 23–27)
CO2 SERPL-SCNC: 17 MMOL/L (ref 22–29)
CO2 SERPL-SCNC: 20 MMOL/L (ref 22–29)
CO2 SERPL-SCNC: 20 MMOL/L (ref 22–29)
CO2 SERPL-SCNC: 22 MMOL/L (ref 22–29)
CO2 SERPL-SCNC: 26 MMOL/L (ref 22–29)
COHGB MFR BLD: 1.4 % (ref 0–2)
COLOR UR: YELLOW
CREAT BLD-MCNC: 0.71 MG/DL (ref 0.57–1)
CREAT BLD-MCNC: 0.85 MG/DL (ref 0.57–1)
CREAT BLD-MCNC: 0.86 MG/DL (ref 0.57–1)
CREAT BLD-MCNC: 0.87 MG/DL (ref 0.57–1)
CREAT BLD-MCNC: 0.92 MG/DL (ref 0.57–1)
D-LACTATE SERPL-SCNC: 1.4 MMOL/L (ref 0.5–2)
D-LACTATE SERPL-SCNC: 3.6 MMOL/L (ref 0.5–2)
DEPRECATED RDW RBC AUTO: 41 FL (ref 37–54)
DEPRECATED RDW RBC AUTO: 42.6 FL (ref 37–54)
DEPRECATED RDW RBC AUTO: 43.6 FL (ref 37–54)
EOSINOPHIL # BLD AUTO: 0 10*3/MM3 (ref 0–0.4)
EOSINOPHIL NFR BLD AUTO: 0 % (ref 0.3–6.2)
ERYTHROCYTE [DISTWIDTH] IN BLOOD BY AUTOMATED COUNT: 12.9 % (ref 12.3–15.4)
ERYTHROCYTE [DISTWIDTH] IN BLOOD BY AUTOMATED COUNT: 13 % (ref 12.3–15.4)
ERYTHROCYTE [DISTWIDTH] IN BLOOD BY AUTOMATED COUNT: 13.3 % (ref 12.3–15.4)
GFR SERPL CREATININE-BSD FRML MDRD: 102 ML/MIN/1.73
GFR SERPL CREATININE-BSD FRML MDRD: 75 ML/MIN/1.73
GFR SERPL CREATININE-BSD FRML MDRD: 80 ML/MIN/1.73
GFR SERPL CREATININE-BSD FRML MDRD: 82 ML/MIN/1.73
GFR SERPL CREATININE-BSD FRML MDRD: 83 ML/MIN/1.73
GLOBULIN UR ELPH-MCNC: 3.3 GM/DL
GLOBULIN UR ELPH-MCNC: 3.6 GM/DL
GLUCOSE BLD-MCNC: 158 MG/DL (ref 65–99)
GLUCOSE BLD-MCNC: 283 MG/DL (ref 65–99)
GLUCOSE BLD-MCNC: 320 MG/DL (ref 65–99)
GLUCOSE BLD-MCNC: 360 MG/DL (ref 65–99)
GLUCOSE BLD-MCNC: 76 MG/DL (ref 65–99)
GLUCOSE BLDC GLUCOMTR-MCNC: 109 MG/DL (ref 70–130)
GLUCOSE BLDC GLUCOMTR-MCNC: 118 MG/DL (ref 70–130)
GLUCOSE BLDC GLUCOMTR-MCNC: 207 MG/DL (ref 70–130)
GLUCOSE BLDC GLUCOMTR-MCNC: 222 MG/DL (ref 70–130)
GLUCOSE BLDC GLUCOMTR-MCNC: 244 MG/DL (ref 70–130)
GLUCOSE BLDC GLUCOMTR-MCNC: 253 MG/DL (ref 70–130)
GLUCOSE BLDC GLUCOMTR-MCNC: 281 MG/DL (ref 70–130)
GLUCOSE UR STRIP-MCNC: ABNORMAL MG/DL
HCO3 BLDA-SCNC: 22.7 MMOL/L (ref 20–26)
HCT VFR BLD AUTO: 41.4 % (ref 34–46.6)
HCT VFR BLD AUTO: 42.1 % (ref 34–46.6)
HCT VFR BLD AUTO: 42.2 % (ref 34–46.6)
HCT VFR BLD CALC: 45.4 %
HGB BLD-MCNC: 14.4 G/DL (ref 12–15.9)
HGB BLD-MCNC: 14.7 G/DL (ref 12–15.9)
HGB BLD-MCNC: 15.3 G/DL (ref 12–15.9)
HGB BLDA-MCNC: 14.8 G/DL (ref 14–18)
HGB UR QL STRIP.AUTO: ABNORMAL
HOLD SPECIMEN: NORMAL
HOROWITZ INDEX BLD+IHG-RTO: 21 %
IMM GRANULOCYTES # BLD AUTO: 0.03 10*3/MM3 (ref 0–0.05)
IMM GRANULOCYTES # BLD AUTO: 0.04 10*3/MM3 (ref 0–0.05)
IMM GRANULOCYTES # BLD AUTO: 0.05 10*3/MM3 (ref 0–0.05)
IMM GRANULOCYTES NFR BLD AUTO: 0.3 % (ref 0–0.5)
IMM GRANULOCYTES NFR BLD AUTO: 0.3 % (ref 0–0.5)
IMM GRANULOCYTES NFR BLD AUTO: 0.4 % (ref 0–0.5)
KETONES UR QL STRIP: ABNORMAL
LEUKOCYTE ESTERASE UR QL STRIP.AUTO: ABNORMAL
LYMPHOCYTES # BLD AUTO: 1.44 10*3/MM3 (ref 0.7–3.1)
LYMPHOCYTES # BLD AUTO: 1.57 10*3/MM3 (ref 0.7–3.1)
LYMPHOCYTES # BLD AUTO: 1.9 10*3/MM3 (ref 0.7–3.1)
LYMPHOCYTES NFR BLD AUTO: 11.5 % (ref 19.6–45.3)
LYMPHOCYTES NFR BLD AUTO: 12.5 % (ref 19.6–45.3)
LYMPHOCYTES NFR BLD AUTO: 15.7 % (ref 19.6–45.3)
MAGNESIUM SERPL-MCNC: 2.3 MG/DL (ref 1.6–2.4)
MCH RBC QN AUTO: 31 PG (ref 26.6–33)
MCH RBC QN AUTO: 31.2 PG (ref 26.6–33)
MCH RBC QN AUTO: 31.5 PG (ref 26.6–33)
MCHC RBC AUTO-ENTMCNC: 34.8 G/DL (ref 31.5–35.7)
MCHC RBC AUTO-ENTMCNC: 34.8 G/DL (ref 31.5–35.7)
MCHC RBC AUTO-ENTMCNC: 36.3 G/DL (ref 31.5–35.7)
MCV RBC AUTO: 86.6 FL (ref 79–97)
MCV RBC AUTO: 89 FL (ref 79–97)
MCV RBC AUTO: 89.6 FL (ref 79–97)
METHGB BLD QL: 1.1 % (ref 0–1.5)
MODALITY: ABNORMAL
MONOCYTES # BLD AUTO: 0.6 10*3/MM3 (ref 0.1–0.9)
MONOCYTES # BLD AUTO: 0.74 10*3/MM3 (ref 0.1–0.9)
MONOCYTES # BLD AUTO: 0.87 10*3/MM3 (ref 0.1–0.9)
MONOCYTES NFR BLD AUTO: 5.2 % (ref 5–12)
MONOCYTES NFR BLD AUTO: 5.4 % (ref 5–12)
MONOCYTES NFR BLD AUTO: 7.2 % (ref 5–12)
NEUTROPHILS # BLD AUTO: 11.37 10*3/MM3 (ref 1.7–7)
NEUTROPHILS # BLD AUTO: 9.34 10*3/MM3 (ref 1.7–7)
NEUTROPHILS # BLD AUTO: 9.48 10*3/MM3 (ref 1.7–7)
NEUTROPHILS NFR BLD AUTO: 77 % (ref 42.7–76)
NEUTROPHILS NFR BLD AUTO: 81.9 % (ref 42.7–76)
NEUTROPHILS NFR BLD AUTO: 83.1 % (ref 42.7–76)
NITRITE UR QL STRIP: NEGATIVE
NOTE: ABNORMAL
OXYHGB MFR BLDV: 96.8 % (ref 94–99)
PCO2 BLDA: 25.8 MM HG (ref 35–45)
PCO2 TEMP ADJ BLD: 25.8 MM HG (ref 35–45)
PH BLDA: 7.55 PH UNITS (ref 7.35–7.45)
PH UR STRIP.AUTO: 5.5 [PH] (ref 5–8)
PH, TEMP CORRECTED: 7.55 PH UNITS
PHOSPHATE SERPL-MCNC: 1.2 MG/DL (ref 2.5–4.5)
PHOSPHATE SERPL-MCNC: 1.4 MG/DL (ref 2.5–4.5)
PLATELET # BLD AUTO: 326 10*3/MM3 (ref 140–450)
PLATELET # BLD AUTO: 327 10*3/MM3 (ref 140–450)
PLATELET # BLD AUTO: 344 10*3/MM3 (ref 140–450)
PMV BLD AUTO: 10.6 FL (ref 6–12)
PMV BLD AUTO: 10.8 FL (ref 6–12)
PMV BLD AUTO: 11 FL (ref 6–12)
PO2 BLDA: 112 MM HG (ref 83–108)
PO2 TEMP ADJ BLD: 112 MM HG (ref 83–108)
POTASSIUM BLD-SCNC: 2.5 MMOL/L (ref 3.5–5.2)
POTASSIUM BLD-SCNC: 3 MMOL/L (ref 3.5–5.2)
POTASSIUM BLD-SCNC: 3.1 MMOL/L (ref 3.5–5.2)
POTASSIUM BLD-SCNC: 3.1 MMOL/L (ref 3.5–5.2)
POTASSIUM BLD-SCNC: 3.2 MMOL/L (ref 3.5–5.2)
PROT SERPL-MCNC: 7.4 G/DL (ref 6–8.5)
PROT SERPL-MCNC: 8.2 G/DL (ref 6–8.5)
PROT UR QL STRIP: ABNORMAL
RBC # BLD AUTO: 4.62 10*6/MM3 (ref 3.77–5.28)
RBC # BLD AUTO: 4.74 10*6/MM3 (ref 3.77–5.28)
RBC # BLD AUTO: 4.86 10*6/MM3 (ref 3.77–5.28)
RBC # UR: ABNORMAL /HPF
REF LAB TEST METHOD: ABNORMAL
SODIUM BLD-SCNC: 138 MMOL/L (ref 136–145)
SODIUM BLD-SCNC: 142 MMOL/L (ref 136–145)
SODIUM BLD-SCNC: 143 MMOL/L (ref 136–145)
SP GR UR STRIP: 1.03 (ref 1–1.03)
SQUAMOUS #/AREA URNS HPF: ABNORMAL /HPF
TOTAL RATE: 17 BREATHS/MINUTE
TSH SERPL DL<=0.05 MIU/L-ACNC: 0.83 MIU/ML (ref 0.27–4.2)
UROBILINOGEN UR QL STRIP: ABNORMAL
WBC NRBC COR # BLD: 11.56 10*3/MM3 (ref 3.4–10.8)
WBC NRBC COR # BLD: 12.12 10*3/MM3 (ref 3.4–10.8)
WBC NRBC COR # BLD: 13.68 10*3/MM3 (ref 3.4–10.8)
WBC UR QL AUTO: ABNORMAL /HPF
WHOLE BLOOD HOLD SPECIMEN: NORMAL
WHOLE BLOOD HOLD SPECIMEN: NORMAL

## 2019-05-29 PROCEDURE — 85025 COMPLETE CBC W/AUTO DIFF WBC: CPT | Performed by: NURSE PRACTITIONER

## 2019-05-29 PROCEDURE — 25010000003 POTASSIUM CHLORIDE 10 MEQ/100ML SOLUTION: Performed by: EMERGENCY MEDICINE

## 2019-05-29 PROCEDURE — 25010000002 HYDRALAZINE PER 20 MG: Performed by: FAMILY MEDICINE

## 2019-05-29 PROCEDURE — 84681 ASSAY OF C-PEPTIDE: CPT | Performed by: FAMILY MEDICINE

## 2019-05-29 PROCEDURE — 25010000002 PROMETHAZINE PER 50 MG: Performed by: INTERNAL MEDICINE

## 2019-05-29 PROCEDURE — 25010000002 ENOXAPARIN PER 10 MG: Performed by: NURSE PRACTITIONER

## 2019-05-29 PROCEDURE — 84100 ASSAY OF PHOSPHORUS: CPT | Performed by: FAMILY MEDICINE

## 2019-05-29 PROCEDURE — 63710000001 INSULIN REGULAR HUMAN PER 5 UNITS: Performed by: EMERGENCY MEDICINE

## 2019-05-29 PROCEDURE — 85025 COMPLETE CBC W/AUTO DIFF WBC: CPT | Performed by: FAMILY MEDICINE

## 2019-05-29 PROCEDURE — 80048 BASIC METABOLIC PNL TOTAL CA: CPT | Performed by: FAMILY MEDICINE

## 2019-05-29 PROCEDURE — 82805 BLOOD GASES W/O2 SATURATION: CPT

## 2019-05-29 PROCEDURE — 82962 GLUCOSE BLOOD TEST: CPT

## 2019-05-29 PROCEDURE — 63710000001 INSULIN DETEMIR PER 5 UNITS: Performed by: INTERNAL MEDICINE

## 2019-05-29 PROCEDURE — G0108 DIAB MANAGE TRN  PER INDIV: HCPCS

## 2019-05-29 PROCEDURE — 25010000002 HYDRALAZINE PER 20 MG: Performed by: EMERGENCY MEDICINE

## 2019-05-29 PROCEDURE — 81001 URINALYSIS AUTO W/SCOPE: CPT | Performed by: EMERGENCY MEDICINE

## 2019-05-29 PROCEDURE — 74177 CT ABD & PELVIS W/CONTRAST: CPT

## 2019-05-29 PROCEDURE — 80053 COMPREHEN METABOLIC PANEL: CPT | Performed by: FAMILY MEDICINE

## 2019-05-29 PROCEDURE — 83605 ASSAY OF LACTIC ACID: CPT | Performed by: FAMILY MEDICINE

## 2019-05-29 PROCEDURE — 87086 URINE CULTURE/COLONY COUNT: CPT | Performed by: FAMILY MEDICINE

## 2019-05-29 PROCEDURE — 99222 1ST HOSP IP/OBS MODERATE 55: CPT | Performed by: INTERNAL MEDICINE

## 2019-05-29 PROCEDURE — 84443 ASSAY THYROID STIM HORMONE: CPT | Performed by: FAMILY MEDICINE

## 2019-05-29 PROCEDURE — 63710000001 INSULIN LISPRO (HUMAN) PER 5 UNITS: Performed by: INTERNAL MEDICINE

## 2019-05-29 PROCEDURE — 0 IOPAMIDOL PER 1 ML: Performed by: EMERGENCY MEDICINE

## 2019-05-29 PROCEDURE — 63710000001 PROMETHAZINE PER 25 MG: Performed by: INTERNAL MEDICINE

## 2019-05-29 PROCEDURE — 25010000002 CEFTRIAXONE PER 250 MG: Performed by: INTERNAL MEDICINE

## 2019-05-29 PROCEDURE — 87040 BLOOD CULTURE FOR BACTERIA: CPT | Performed by: INTERNAL MEDICINE

## 2019-05-29 PROCEDURE — 36600 WITHDRAWAL OF ARTERIAL BLOOD: CPT

## 2019-05-29 PROCEDURE — 25010000002 ONDANSETRON PER 1 MG: Performed by: NURSE PRACTITIONER

## 2019-05-29 PROCEDURE — 25010000002 ONDANSETRON PER 1 MG: Performed by: EMERGENCY MEDICINE

## 2019-05-29 PROCEDURE — 63710000001 INSULIN LISPRO (HUMAN) PER 5 UNITS: Performed by: NURSE PRACTITIONER

## 2019-05-29 RX ORDER — ONDANSETRON 2 MG/ML
4 INJECTION INTRAMUSCULAR; INTRAVENOUS ONCE
Status: COMPLETED | OUTPATIENT
Start: 2019-05-29 | End: 2019-05-29

## 2019-05-29 RX ORDER — POTASSIUM CHLORIDE 1.5 G/1.77G
40 POWDER, FOR SOLUTION ORAL AS NEEDED
Status: DISCONTINUED | OUTPATIENT
Start: 2019-05-29 | End: 2019-05-29

## 2019-05-29 RX ORDER — POTASSIUM CHLORIDE 750 MG/1
20 CAPSULE, EXTENDED RELEASE ORAL AS NEEDED
Status: DISCONTINUED | OUTPATIENT
Start: 2019-05-29 | End: 2019-05-29

## 2019-05-29 RX ORDER — POTASSIUM CHLORIDE 7.45 MG/ML
10 INJECTION INTRAVENOUS ONCE
Status: COMPLETED | OUTPATIENT
Start: 2019-05-29 | End: 2019-05-29

## 2019-05-29 RX ORDER — DEXTROSE MONOHYDRATE 25 G/50ML
25 INJECTION, SOLUTION INTRAVENOUS
Status: DISCONTINUED | OUTPATIENT
Start: 2019-05-29 | End: 2019-05-31 | Stop reason: HOSPADM

## 2019-05-29 RX ORDER — PRAVASTATIN SODIUM 20 MG
20 TABLET ORAL DAILY
Status: DISCONTINUED | OUTPATIENT
Start: 2019-05-29 | End: 2019-05-31 | Stop reason: HOSPADM

## 2019-05-29 RX ORDER — AMLODIPINE BESYLATE 10 MG/1
10 TABLET ORAL DAILY
Status: DISCONTINUED | OUTPATIENT
Start: 2019-05-29 | End: 2019-05-31 | Stop reason: HOSPADM

## 2019-05-29 RX ORDER — LISINOPRIL 10 MG/1
10 TABLET ORAL DAILY
Status: DISCONTINUED | OUTPATIENT
Start: 2019-05-29 | End: 2019-05-31 | Stop reason: HOSPADM

## 2019-05-29 RX ORDER — UBIDECARENONE 75 MG
50 CAPSULE ORAL DAILY
Status: DISCONTINUED | OUTPATIENT
Start: 2019-05-29 | End: 2019-05-31 | Stop reason: HOSPADM

## 2019-05-29 RX ORDER — HYDRALAZINE HYDROCHLORIDE 20 MG/ML
10 INJECTION INTRAMUSCULAR; INTRAVENOUS EVERY 6 HOURS PRN
Status: DISCONTINUED | OUTPATIENT
Start: 2019-05-29 | End: 2019-05-31 | Stop reason: HOSPADM

## 2019-05-29 RX ORDER — POTASSIUM CHLORIDE 1.5 G/1.77G
10 POWDER, FOR SOLUTION ORAL AS NEEDED
Status: DISCONTINUED | OUTPATIENT
Start: 2019-05-29 | End: 2019-05-29

## 2019-05-29 RX ORDER — POTASSIUM CHLORIDE 1.5 G/1.77G
20 POWDER, FOR SOLUTION ORAL AS NEEDED
Status: DISCONTINUED | OUTPATIENT
Start: 2019-05-29 | End: 2019-05-29

## 2019-05-29 RX ORDER — POTASSIUM CHLORIDE 7.46 G/1000ML
10 INJECTION, SOLUTION INTRAVENOUS AS NEEDED
Status: DISCONTINUED | OUTPATIENT
Start: 2019-05-29 | End: 2019-05-29

## 2019-05-29 RX ORDER — POTASSIUM CHLORIDE 7.45 MG/ML
10 INJECTION INTRAVENOUS
Status: DISCONTINUED | OUTPATIENT
Start: 2019-05-29 | End: 2019-05-31 | Stop reason: HOSPADM

## 2019-05-29 RX ORDER — NICOTINE POLACRILEX 4 MG
15 LOZENGE BUCCAL
Status: DISCONTINUED | OUTPATIENT
Start: 2019-05-29 | End: 2019-05-31 | Stop reason: HOSPADM

## 2019-05-29 RX ORDER — ACETAMINOPHEN 325 MG/1
650 TABLET ORAL EVERY 4 HOURS PRN
Status: DISCONTINUED | OUTPATIENT
Start: 2019-05-29 | End: 2019-05-31 | Stop reason: HOSPADM

## 2019-05-29 RX ORDER — SODIUM CHLORIDE 450 MG/100ML
250 INJECTION, SOLUTION INTRAVENOUS CONTINUOUS
Status: DISCONTINUED | OUTPATIENT
Start: 2019-05-29 | End: 2019-05-29

## 2019-05-29 RX ORDER — DEXTROSE, SODIUM CHLORIDE, AND POTASSIUM CHLORIDE 5; .45; .15 G/100ML; G/100ML; G/100ML
150 INJECTION INTRAVENOUS CONTINUOUS PRN
Status: DISCONTINUED | OUTPATIENT
Start: 2019-05-29 | End: 2019-05-29

## 2019-05-29 RX ORDER — HYDROCHLOROTHIAZIDE 12.5 MG/1
12.5 TABLET ORAL DAILY
Status: DISCONTINUED | OUTPATIENT
Start: 2019-05-29 | End: 2019-05-31 | Stop reason: HOSPADM

## 2019-05-29 RX ORDER — SODIUM CHLORIDE AND POTASSIUM CHLORIDE 150; 450 MG/100ML; MG/100ML
75 INJECTION, SOLUTION INTRAVENOUS CONTINUOUS
Status: DISCONTINUED | OUTPATIENT
Start: 2019-05-29 | End: 2019-05-29

## 2019-05-29 RX ORDER — SODIUM CHLORIDE AND POTASSIUM CHLORIDE 150; 450 MG/100ML; MG/100ML
250 INJECTION, SOLUTION INTRAVENOUS CONTINUOUS PRN
Status: DISCONTINUED | OUTPATIENT
Start: 2019-05-29 | End: 2019-05-29

## 2019-05-29 RX ORDER — ASCORBIC ACID 500 MG
500 TABLET ORAL DAILY
Status: DISCONTINUED | OUTPATIENT
Start: 2019-05-29 | End: 2019-05-31 | Stop reason: HOSPADM

## 2019-05-29 RX ORDER — ASPIRIN 325 MG
325 TABLET ORAL DAILY
Status: DISCONTINUED | OUTPATIENT
Start: 2019-05-29 | End: 2019-05-31 | Stop reason: HOSPADM

## 2019-05-29 RX ORDER — SODIUM CHLORIDE 9 MG/ML
150 INJECTION, SOLUTION INTRAVENOUS CONTINUOUS
Status: DISCONTINUED | OUTPATIENT
Start: 2019-05-29 | End: 2019-05-31 | Stop reason: HOSPADM

## 2019-05-29 RX ORDER — ONDANSETRON 2 MG/ML
4 INJECTION INTRAMUSCULAR; INTRAVENOUS EVERY 6 HOURS PRN
Status: DISCONTINUED | OUTPATIENT
Start: 2019-05-29 | End: 2019-05-31 | Stop reason: HOSPADM

## 2019-05-29 RX ORDER — DEXTROSE AND SODIUM CHLORIDE 5; .45 G/100ML; G/100ML
150 INJECTION, SOLUTION INTRAVENOUS CONTINUOUS PRN
Status: DISCONTINUED | OUTPATIENT
Start: 2019-05-29 | End: 2019-05-29

## 2019-05-29 RX ORDER — SODIUM CHLORIDE 0.9 % (FLUSH) 0.9 %
3 SYRINGE (ML) INJECTION EVERY 12 HOURS SCHEDULED
Status: DISCONTINUED | OUTPATIENT
Start: 2019-05-29 | End: 2019-05-31 | Stop reason: HOSPADM

## 2019-05-29 RX ORDER — DEXTROSE MONOHYDRATE 25 G/50ML
12.5 INJECTION, SOLUTION INTRAVENOUS
Status: DISCONTINUED | OUTPATIENT
Start: 2019-05-29 | End: 2019-05-29

## 2019-05-29 RX ORDER — PROMETHAZINE HYDROCHLORIDE 25 MG/1
12.5 TABLET ORAL EVERY 6 HOURS PRN
Status: DISCONTINUED | OUTPATIENT
Start: 2019-05-29 | End: 2019-05-31 | Stop reason: HOSPADM

## 2019-05-29 RX ORDER — SODIUM CHLORIDE 0.9 % (FLUSH) 0.9 %
3-10 SYRINGE (ML) INJECTION AS NEEDED
Status: DISCONTINUED | OUTPATIENT
Start: 2019-05-29 | End: 2019-05-31 | Stop reason: HOSPADM

## 2019-05-29 RX ORDER — POTASSIUM CHLORIDE 750 MG/1
40 CAPSULE, EXTENDED RELEASE ORAL AS NEEDED
Status: DISCONTINUED | OUTPATIENT
Start: 2019-05-29 | End: 2019-05-29

## 2019-05-29 RX ORDER — POTASSIUM CHLORIDE 750 MG/1
40 CAPSULE, EXTENDED RELEASE ORAL AS NEEDED
Status: DISCONTINUED | OUTPATIENT
Start: 2019-05-29 | End: 2019-05-31 | Stop reason: HOSPADM

## 2019-05-29 RX ORDER — POTASSIUM CHLORIDE 750 MG/1
10 CAPSULE, EXTENDED RELEASE ORAL AS NEEDED
Status: DISCONTINUED | OUTPATIENT
Start: 2019-05-29 | End: 2019-05-29

## 2019-05-29 RX ORDER — PROMETHAZINE HYDROCHLORIDE 25 MG/ML
12.5 INJECTION, SOLUTION INTRAMUSCULAR; INTRAVENOUS EVERY 6 HOURS PRN
Status: DISCONTINUED | OUTPATIENT
Start: 2019-05-29 | End: 2019-05-31 | Stop reason: HOSPADM

## 2019-05-29 RX ORDER — HYDRALAZINE HYDROCHLORIDE 20 MG/ML
10 INJECTION INTRAMUSCULAR; INTRAVENOUS ONCE
Status: COMPLETED | OUTPATIENT
Start: 2019-05-29 | End: 2019-05-29

## 2019-05-29 RX ORDER — POTASSIUM CHLORIDE 1.5 G/1.77G
40 POWDER, FOR SOLUTION ORAL AS NEEDED
Status: DISCONTINUED | OUTPATIENT
Start: 2019-05-29 | End: 2019-05-31 | Stop reason: HOSPADM

## 2019-05-29 RX ADMIN — ONDANSETRON 4 MG: 2 INJECTION INTRAMUSCULAR; INTRAVENOUS at 01:13

## 2019-05-29 RX ADMIN — POTASSIUM CHLORIDE 10 MEQ: 7.46 INJECTION, SOLUTION INTRAVENOUS at 03:42

## 2019-05-29 RX ADMIN — HYDROCHLOROTHIAZIDE 12.5 MG: 12.5 TABLET ORAL at 08:24

## 2019-05-29 RX ADMIN — INSULIN LISPRO 5 UNITS: 100 INJECTION, SOLUTION INTRAVENOUS; SUBCUTANEOUS at 12:14

## 2019-05-29 RX ADMIN — IOPAMIDOL 100 ML: 755 INJECTION, SOLUTION INTRAVENOUS at 01:12

## 2019-05-29 RX ADMIN — SODIUM CHLORIDE 1000 ML: 9 INJECTION, SOLUTION INTRAVENOUS at 01:12

## 2019-05-29 RX ADMIN — POTASSIUM & SODIUM PHOSPHATES POWDER PACK 280-160-250 MG 2 PACKET: 280-160-250 PACK at 08:24

## 2019-05-29 RX ADMIN — POTASSIUM CHLORIDE 40 MEQ: 750 CAPSULE, EXTENDED RELEASE ORAL at 21:22

## 2019-05-29 RX ADMIN — INSULIN LISPRO 3 UNITS: 100 INJECTION, SOLUTION INTRAVENOUS; SUBCUTANEOUS at 21:24

## 2019-05-29 RX ADMIN — OXYCODONE HYDROCHLORIDE AND ACETAMINOPHEN 500 MG: 500 TABLET ORAL at 08:25

## 2019-05-29 RX ADMIN — PROMETHAZINE HYDROCHLORIDE 12.5 MG: 25 TABLET ORAL at 11:14

## 2019-05-29 RX ADMIN — PRAVASTATIN SODIUM 20 MG: 20 TABLET ORAL at 08:24

## 2019-05-29 RX ADMIN — CEFTRIAXONE SODIUM 1 G: 1 INJECTION, POWDER, FOR SOLUTION INTRAMUSCULAR; INTRAVENOUS at 12:35

## 2019-05-29 RX ADMIN — HYDRALAZINE HYDROCHLORIDE 10 MG: 20 INJECTION INTRAMUSCULAR; INTRAVENOUS at 06:43

## 2019-05-29 RX ADMIN — PROMETHAZINE HYDROCHLORIDE 12.5 MG: 25 INJECTION INTRAMUSCULAR; INTRAVENOUS at 20:06

## 2019-05-29 RX ADMIN — POTASSIUM CHLORIDE 40 MEQ: 750 CAPSULE, EXTENDED RELEASE ORAL at 11:14

## 2019-05-29 RX ADMIN — ENOXAPARIN SODIUM 40 MG: 40 INJECTION SUBCUTANEOUS at 08:28

## 2019-05-29 RX ADMIN — SODIUM CHLORIDE 150 ML/HR: 9 INJECTION, SOLUTION INTRAVENOUS at 13:49

## 2019-05-29 RX ADMIN — ASPIRIN 325 MG ORAL TABLET 325 MG: 325 PILL ORAL at 08:24

## 2019-05-29 RX ADMIN — LISINOPRIL 10 MG: 10 TABLET ORAL at 08:25

## 2019-05-29 RX ADMIN — HYDRALAZINE HYDROCHLORIDE 10 MG: 20 INJECTION INTRAMUSCULAR; INTRAVENOUS at 03:42

## 2019-05-29 RX ADMIN — AMLODIPINE BESYLATE 10 MG: 10 TABLET ORAL at 08:25

## 2019-05-29 RX ADMIN — INSULIN LISPRO 4 UNITS: 100 INJECTION, SOLUTION INTRAVENOUS; SUBCUTANEOUS at 12:14

## 2019-05-29 RX ADMIN — INSULIN DETEMIR 10 UNITS: 100 INJECTION, SOLUTION SUBCUTANEOUS at 21:23

## 2019-05-29 RX ADMIN — SODIUM CHLORIDE 1000 ML: 9 INJECTION, SOLUTION INTRAVENOUS at 03:41

## 2019-05-29 RX ADMIN — ACETAMINOPHEN 650 MG: 325 TABLET ORAL at 20:06

## 2019-05-29 RX ADMIN — INSULIN LISPRO 4 UNITS: 100 INJECTION, SOLUTION INTRAVENOUS; SUBCUTANEOUS at 08:28

## 2019-05-29 RX ADMIN — INSULIN HUMAN 8 UNITS: 100 INJECTION, SOLUTION PARENTERAL at 03:29

## 2019-05-29 RX ADMIN — POTASSIUM CHLORIDE 10 MEQ: 7.46 INJECTION, SOLUTION INTRAVENOUS at 05:15

## 2019-05-29 RX ADMIN — POTASSIUM CHLORIDE 10 MEQ: 7.46 INJECTION, SOLUTION INTRAVENOUS at 06:43

## 2019-05-29 RX ADMIN — POTASSIUM CHLORIDE 10 MEQ: 7.46 INJECTION, SOLUTION INTRAVENOUS at 08:48

## 2019-05-29 RX ADMIN — VITAM B12 50 MCG: 100 TAB at 08:24

## 2019-05-29 RX ADMIN — POTASSIUM CHLORIDE, DEXTROSE MONOHYDRATE AND SODIUM CHLORIDE 150 ML/HR: 150; 5; 450 INJECTION, SOLUTION INTRAVENOUS at 05:14

## 2019-05-29 RX ADMIN — HYDRALAZINE HYDROCHLORIDE 10 MG: 20 INJECTION INTRAMUSCULAR; INTRAVENOUS at 23:41

## 2019-05-29 RX ADMIN — ONDANSETRON 4 MG: 2 INJECTION INTRAMUSCULAR; INTRAVENOUS at 05:56

## 2019-05-30 LAB
ANION GAP SERPL CALCULATED.3IONS-SCNC: 12 MMOL/L
BACTERIA SPEC AEROBE CULT: NORMAL
BUN BLD-MCNC: 10 MG/DL (ref 8–23)
BUN/CREAT SERPL: 14.5 (ref 7–25)
C PEPTIDE SERPL-MCNC: 0.7 NG/ML (ref 1.1–4.4)
CALCIUM SPEC-SCNC: 8.9 MG/DL (ref 8.6–10.5)
CHLORIDE SERPL-SCNC: 104 MMOL/L (ref 98–107)
CO2 SERPL-SCNC: 21 MMOL/L (ref 22–29)
CREAT BLD-MCNC: 0.69 MG/DL (ref 0.57–1)
DEPRECATED RDW RBC AUTO: 43.5 FL (ref 37–54)
ERYTHROCYTE [DISTWIDTH] IN BLOOD BY AUTOMATED COUNT: 13.4 % (ref 12.3–15.4)
GFR SERPL CREATININE-BSD FRML MDRD: 105 ML/MIN/1.73
GLUCOSE BLD-MCNC: 171 MG/DL (ref 65–99)
GLUCOSE BLDC GLUCOMTR-MCNC: 148 MG/DL (ref 70–130)
GLUCOSE BLDC GLUCOMTR-MCNC: 182 MG/DL (ref 70–130)
GLUCOSE BLDC GLUCOMTR-MCNC: 202 MG/DL (ref 70–130)
GLUCOSE BLDC GLUCOMTR-MCNC: 281 MG/DL (ref 70–130)
HCT VFR BLD AUTO: 41.2 % (ref 34–46.6)
HGB BLD-MCNC: 14.3 G/DL (ref 12–15.9)
MCH RBC QN AUTO: 30.6 PG (ref 26.6–33)
MCHC RBC AUTO-ENTMCNC: 34.7 G/DL (ref 31.5–35.7)
MCV RBC AUTO: 88.2 FL (ref 79–97)
PLATELET # BLD AUTO: 304 10*3/MM3 (ref 140–450)
PMV BLD AUTO: 10.2 FL (ref 6–12)
POTASSIUM BLD-SCNC: 3.5 MMOL/L (ref 3.5–5.2)
POTASSIUM BLD-SCNC: 3.6 MMOL/L (ref 3.5–5.2)
RBC # BLD AUTO: 4.67 10*6/MM3 (ref 3.77–5.28)
SODIUM BLD-SCNC: 137 MMOL/L (ref 136–145)
WBC NRBC COR # BLD: 10.39 10*3/MM3 (ref 3.4–10.8)

## 2019-05-30 PROCEDURE — 80048 BASIC METABOLIC PNL TOTAL CA: CPT | Performed by: INTERNAL MEDICINE

## 2019-05-30 PROCEDURE — 85027 COMPLETE CBC AUTOMATED: CPT | Performed by: INTERNAL MEDICINE

## 2019-05-30 PROCEDURE — 84132 ASSAY OF SERUM POTASSIUM: CPT | Performed by: INTERNAL MEDICINE

## 2019-05-30 PROCEDURE — 82962 GLUCOSE BLOOD TEST: CPT

## 2019-05-30 PROCEDURE — 25010000002 PROMETHAZINE PER 50 MG: Performed by: INTERNAL MEDICINE

## 2019-05-30 PROCEDURE — 25010000002 CEFTRIAXONE PER 250 MG: Performed by: INTERNAL MEDICINE

## 2019-05-30 PROCEDURE — 63710000001 INSULIN DETEMIR PER 5 UNITS: Performed by: INTERNAL MEDICINE

## 2019-05-30 PROCEDURE — 99233 SBSQ HOSP IP/OBS HIGH 50: CPT | Performed by: INTERNAL MEDICINE

## 2019-05-30 PROCEDURE — 25010000002 ENOXAPARIN PER 10 MG: Performed by: NURSE PRACTITIONER

## 2019-05-30 RX ORDER — PANTOPRAZOLE SODIUM 40 MG/1
40 TABLET, DELAYED RELEASE ORAL
Status: DISCONTINUED | OUTPATIENT
Start: 2019-05-30 | End: 2019-05-31 | Stop reason: HOSPADM

## 2019-05-30 RX ADMIN — PROMETHAZINE HYDROCHLORIDE 12.5 MG: 25 INJECTION INTRAMUSCULAR; INTRAVENOUS at 21:10

## 2019-05-30 RX ADMIN — PANTOPRAZOLE SODIUM 40 MG: 40 TABLET, DELAYED RELEASE ORAL at 06:17

## 2019-05-30 RX ADMIN — POTASSIUM CHLORIDE 40 MEQ: 750 CAPSULE, EXTENDED RELEASE ORAL at 14:48

## 2019-05-30 RX ADMIN — PRAVASTATIN SODIUM 20 MG: 20 TABLET ORAL at 10:04

## 2019-05-30 RX ADMIN — PANTOPRAZOLE SODIUM 40 MG: 40 TABLET, DELAYED RELEASE ORAL at 18:31

## 2019-05-30 RX ADMIN — POTASSIUM CHLORIDE 40 MEQ: 750 CAPSULE, EXTENDED RELEASE ORAL at 02:30

## 2019-05-30 RX ADMIN — INSULIN LISPRO 3 UNITS: 100 INJECTION, SOLUTION INTRAVENOUS; SUBCUTANEOUS at 21:11

## 2019-05-30 RX ADMIN — AMLODIPINE BESYLATE 10 MG: 10 TABLET ORAL at 10:06

## 2019-05-30 RX ADMIN — INSULIN LISPRO 5 UNITS: 100 INJECTION, SOLUTION INTRAVENOUS; SUBCUTANEOUS at 14:47

## 2019-05-30 RX ADMIN — ENOXAPARIN SODIUM 40 MG: 40 INJECTION SUBCUTANEOUS at 10:31

## 2019-05-30 RX ADMIN — SODIUM CHLORIDE, PRESERVATIVE FREE 3 ML: 5 INJECTION INTRAVENOUS at 21:11

## 2019-05-30 RX ADMIN — POTASSIUM CHLORIDE 40 MEQ: 750 CAPSULE, EXTENDED RELEASE ORAL at 10:03

## 2019-05-30 RX ADMIN — INSULIN LISPRO 5 UNITS: 100 INJECTION, SOLUTION INTRAVENOUS; SUBCUTANEOUS at 18:27

## 2019-05-30 RX ADMIN — OXYCODONE HYDROCHLORIDE AND ACETAMINOPHEN 500 MG: 500 TABLET ORAL at 10:29

## 2019-05-30 RX ADMIN — ASPIRIN 325 MG ORAL TABLET 325 MG: 325 PILL ORAL at 10:03

## 2019-05-30 RX ADMIN — INSULIN DETEMIR 10 UNITS: 100 INJECTION, SOLUTION SUBCUTANEOUS at 21:11

## 2019-05-30 RX ADMIN — INSULIN LISPRO 2 UNITS: 100 INJECTION, SOLUTION INTRAVENOUS; SUBCUTANEOUS at 18:26

## 2019-05-30 RX ADMIN — HYDROCHLOROTHIAZIDE 12.5 MG: 12.5 TABLET ORAL at 10:32

## 2019-05-30 RX ADMIN — LISINOPRIL 10 MG: 10 TABLET ORAL at 10:32

## 2019-05-30 RX ADMIN — SODIUM CHLORIDE 150 ML/HR: 9 INJECTION, SOLUTION INTRAVENOUS at 06:16

## 2019-05-30 RX ADMIN — INSULIN LISPRO 4 UNITS: 100 INJECTION, SOLUTION INTRAVENOUS; SUBCUTANEOUS at 14:46

## 2019-05-30 RX ADMIN — SODIUM CHLORIDE, PRESERVATIVE FREE 3 ML: 5 INJECTION INTRAVENOUS at 10:33

## 2019-05-30 RX ADMIN — CEFTRIAXONE SODIUM 1 G: 1 INJECTION, POWDER, FOR SOLUTION INTRAMUSCULAR; INTRAVENOUS at 14:46

## 2019-05-30 RX ADMIN — VITAM B12 50 MCG: 100 TAB at 10:04

## 2019-05-31 VITALS
HEART RATE: 84 BPM | RESPIRATION RATE: 18 BRPM | TEMPERATURE: 98.4 F | SYSTOLIC BLOOD PRESSURE: 170 MMHG | WEIGHT: 135.8 LBS | OXYGEN SATURATION: 98 % | DIASTOLIC BLOOD PRESSURE: 80 MMHG | HEIGHT: 68 IN | BODY MASS INDEX: 20.58 KG/M2

## 2019-05-31 PROBLEM — R11.2 NON-INTRACTABLE VOMITING WITH NAUSEA: Status: RESOLVED | Noted: 2019-05-29 | Resolved: 2019-05-31

## 2019-05-31 PROBLEM — E11.10 DKA (DIABETIC KETOACIDOSES): Status: RESOLVED | Noted: 2019-05-29 | Resolved: 2019-05-31

## 2019-05-31 PROBLEM — A41.9 SEPSIS: Status: RESOLVED | Noted: 2019-05-29 | Resolved: 2019-05-31

## 2019-05-31 PROBLEM — N39.0 UTI (URINARY TRACT INFECTION): Status: RESOLVED | Noted: 2019-05-29 | Resolved: 2019-05-31

## 2019-05-31 LAB
ANION GAP SERPL CALCULATED.3IONS-SCNC: 14 MMOL/L
BUN BLD-MCNC: 10 MG/DL (ref 8–23)
BUN/CREAT SERPL: 15.2 (ref 7–25)
CALCIUM SPEC-SCNC: 9.6 MG/DL (ref 8.6–10.5)
CHLORIDE SERPL-SCNC: 102 MMOL/L (ref 98–107)
CO2 SERPL-SCNC: 24 MMOL/L (ref 22–29)
CREAT BLD-MCNC: 0.66 MG/DL (ref 0.57–1)
DEPRECATED RDW RBC AUTO: 44.1 FL (ref 37–54)
ERYTHROCYTE [DISTWIDTH] IN BLOOD BY AUTOMATED COUNT: 13.4 % (ref 12.3–15.4)
GFR SERPL CREATININE-BSD FRML MDRD: 111 ML/MIN/1.73
GLUCOSE BLD-MCNC: 135 MG/DL (ref 65–99)
GLUCOSE BLDC GLUCOMTR-MCNC: 125 MG/DL (ref 70–130)
GLUCOSE BLDC GLUCOMTR-MCNC: 127 MG/DL (ref 70–130)
GLUCOSE BLDC GLUCOMTR-MCNC: 152 MG/DL (ref 70–130)
HCT VFR BLD AUTO: 45 % (ref 34–46.6)
HGB BLD-MCNC: 15.2 G/DL (ref 12–15.9)
MCH RBC QN AUTO: 30.6 PG (ref 26.6–33)
MCHC RBC AUTO-ENTMCNC: 33.8 G/DL (ref 31.5–35.7)
MCV RBC AUTO: 90.5 FL (ref 79–97)
PLATELET # BLD AUTO: 285 10*3/MM3 (ref 140–450)
PMV BLD AUTO: 10.8 FL (ref 6–12)
POTASSIUM BLD-SCNC: 3.4 MMOL/L (ref 3.5–5.2)
RBC # BLD AUTO: 4.97 10*6/MM3 (ref 3.77–5.28)
SODIUM BLD-SCNC: 140 MMOL/L (ref 136–145)
WBC NRBC COR # BLD: 7.48 10*3/MM3 (ref 3.4–10.8)

## 2019-05-31 PROCEDURE — 80048 BASIC METABOLIC PNL TOTAL CA: CPT | Performed by: INTERNAL MEDICINE

## 2019-05-31 PROCEDURE — 97161 PT EVAL LOW COMPLEX 20 MIN: CPT

## 2019-05-31 PROCEDURE — 97116 GAIT TRAINING THERAPY: CPT

## 2019-05-31 PROCEDURE — 25010000002 ENOXAPARIN PER 10 MG: Performed by: NURSE PRACTITIONER

## 2019-05-31 PROCEDURE — 99239 HOSP IP/OBS DSCHRG MGMT >30: CPT | Performed by: INTERNAL MEDICINE

## 2019-05-31 PROCEDURE — 97110 THERAPEUTIC EXERCISES: CPT

## 2019-05-31 PROCEDURE — 82962 GLUCOSE BLOOD TEST: CPT

## 2019-05-31 PROCEDURE — 97530 THERAPEUTIC ACTIVITIES: CPT

## 2019-05-31 PROCEDURE — 85027 COMPLETE CBC AUTOMATED: CPT | Performed by: INTERNAL MEDICINE

## 2019-05-31 RX ORDER — PANTOPRAZOLE SODIUM 40 MG/1
40 TABLET, DELAYED RELEASE ORAL DAILY
Qty: 30 TABLET | Refills: 0 | Status: SHIPPED | OUTPATIENT
Start: 2019-05-31 | End: 2019-06-17 | Stop reason: SDUPTHER

## 2019-05-31 RX ADMIN — HYDROCHLOROTHIAZIDE 12.5 MG: 12.5 TABLET ORAL at 09:45

## 2019-05-31 RX ADMIN — LISINOPRIL 10 MG: 10 TABLET ORAL at 09:44

## 2019-05-31 RX ADMIN — ENOXAPARIN SODIUM 40 MG: 40 INJECTION SUBCUTANEOUS at 09:45

## 2019-05-31 RX ADMIN — INSULIN LISPRO 2 UNITS: 100 INJECTION, SOLUTION INTRAVENOUS; SUBCUTANEOUS at 13:23

## 2019-05-31 RX ADMIN — AMLODIPINE BESYLATE 10 MG: 10 TABLET ORAL at 09:47

## 2019-05-31 RX ADMIN — PANTOPRAZOLE SODIUM 40 MG: 40 TABLET, DELAYED RELEASE ORAL at 09:45

## 2019-05-31 RX ADMIN — SODIUM CHLORIDE, PRESERVATIVE FREE 3 ML: 5 INJECTION INTRAVENOUS at 09:49

## 2019-05-31 RX ADMIN — OXYCODONE HYDROCHLORIDE AND ACETAMINOPHEN 500 MG: 500 TABLET ORAL at 09:45

## 2019-05-31 RX ADMIN — INSULIN LISPRO 5 UNITS: 100 INJECTION, SOLUTION INTRAVENOUS; SUBCUTANEOUS at 13:23

## 2019-05-31 RX ADMIN — PRAVASTATIN SODIUM 20 MG: 20 TABLET ORAL at 09:44

## 2019-05-31 RX ADMIN — ASPIRIN 325 MG ORAL TABLET 325 MG: 325 PILL ORAL at 09:45

## 2019-05-31 RX ADMIN — VITAM B12 50 MCG: 100 TAB at 09:45

## 2019-05-31 RX ADMIN — INSULIN LISPRO 5 UNITS: 100 INJECTION, SOLUTION INTRAVENOUS; SUBCUTANEOUS at 09:46

## 2019-06-01 ENCOUNTER — READMISSION MANAGEMENT (OUTPATIENT)
Dept: CALL CENTER | Facility: HOSPITAL | Age: 59
End: 2019-06-01

## 2019-06-01 NOTE — OUTREACH NOTE
Prep Survey      Responses   Facility patient discharged from?  [facility]   Is patient eligible?  Yes   Discharge diagnosis   Sepsis    Does the patient have one of the following disease processes/diagnoses(primary or secondary)?  Sepsis   Does the patient have Home health ordered?  No   Is there a DME ordered?  No   Prep survey completed?  Yes          Suzanne Burrows, RN

## 2019-06-03 ENCOUNTER — TELEPHONE (OUTPATIENT)
Dept: INTERNAL MEDICINE | Facility: CLINIC | Age: 59
End: 2019-06-03

## 2019-06-03 LAB
BACTERIA SPEC AEROBE CULT: NORMAL

## 2019-06-03 NOTE — TELEPHONE ENCOUNTER
"Pt states feeling exhausted but stomach issues \"all better\". She confirms new patient hosp f/u with Monet Depaul APRN 6/7/19. TCM not applicable with her insurance.  "

## 2019-06-05 ENCOUNTER — READMISSION MANAGEMENT (OUTPATIENT)
Dept: CALL CENTER | Facility: HOSPITAL | Age: 59
End: 2019-06-05

## 2019-06-05 NOTE — PAYOR COMM NOTE
"Attention: Cecilia Reyes, RN Utilization Review [phone]  Fax # [phone]      Pt pended to [place] Medicaid on admission 19.  Pt now has [employer].  WELLCARE ID- [id number].    Please review clinicals for inpatient admission.       Alisa Michael (58 y.o. Female)     Date of Birth Social Security Number Address Home Phone MRN    1960  [address] [place] [address] [phone] [id number]    Sikhism Marital Status          Holiness        Admission Date Admission Type Admitting Provider Attending Provider Department, Room/Bed    19 Emergency Rubi Worthy MD  [facility] 3F, S317/    Discharge Date Discharge Disposition Discharge Destination        2019 Home or Self Care              Attending Provider:  (none)   Allergies:  No Known Allergies    Isolation:  None   Infection:  None   Code Status:  Prior    Ht:  172.7 cm (68\")   Wt:  61.6 kg (135 lb 12.8 oz)    Admission Cmt:  None   Principal Problem:  Sepsis (CMS/[facility]) [A41.9]                 Active Insurance as of 2019     Primary Coverage     Payor Plan Insurance Group Employer/Plan Group    [employer] [place] [employer]      Payor Plan Address Payor Plan Phone Number Payor Plan Fax Number Effective Dates    [address] [address] [phone]  2019 - 2019    [facility] [id number]       Subscriber Name Subscriber Birth Date Member ID       ALISA MICAHEL 1960 [id number]                 Emergency Contacts      (Rel.) Home Phone Work Phone Mobile Phone    Keshav Soto (Son) [phone] -- --               History & Physical      Rubi Worthy MD at 2019  5:23 AM          [facility] Medicine Services  HISTORY AND PHYSICAL    Patient Name: Alisa Michael  : 1960  MRN: [id number]  Primary Care Physician: Sharita García DO (Inactive)    Subjective   Subjective     Chief " Complaint:  Nausea/vomiting/diarrhea    HPI:  Thelma Michael is a 58 y.o. female with a past medical history significant for Diabetes mellitus, hyperlipidemia and essential hypertension presents to the ED with complaints of nausea, vomiting and diarrhea that has been ongoing for four days.  Patient reports that her blood glucose yesterday was in the 200s.  She reports taking her oral diabetes medication but she has not been taking her insulin because she has not been eating.  In addition she notes weakness fatigue with inability to keep anything oral down.  She reports that the diarrhea has improved today. She additionally notes frequent urination with increased thirst along with generalized abdominal pain.  She denies any fever, chest pain, shortness of air, cough, melena, dysuria, headache or palpitations.  Upon arrival to the ED patient's blood glucose was noted to be 344, anion gap 25, beta hydroxy butyrate 3.147, UA shows greater than 1000 glucose with positive ketones.  Patient will be admitted to [facility] under the care of the hospitalist for further evaluation and treatment.    Review of Systems   Constitutional: Positive for appetite change, chills and fatigue. Negative for activity change, diaphoresis, fever and unexpected weight change.   HENT: Negative.    Eyes: Negative for photophobia and visual disturbance.   Respiratory: Negative for cough and shortness of breath.    Cardiovascular: Negative for chest pain, palpitations and leg swelling.   Gastrointestinal: Positive for abdominal pain, diarrhea, nausea and vomiting. Negative for abdominal distention and constipation.   Endocrine: Positive for polydipsia and polyuria.   Genitourinary: Negative.    Musculoskeletal: Negative.    Skin: Negative.    Neurological: Positive for weakness. Negative for dizziness, facial asymmetry, speech difficulty, light-headedness, numbness and headaches.   Psychiatric/Behavioral: Negative.          Otherwise 10-system ROS reviewed and is negative except as mentioned in the HPI.    Personal History     Past Medical History:   Diagnosis Date   • Acid reflux    • Acute bronchitis    • Cardiac murmur    • Diabetes mellitus (CMS/HCC)    • H/O echocardiogram 08/07/2012    i. LVEF 65%.ii. Mild LVH.iii. Borderline evidence of atrial septal aneurysm.  No PFO.    • History of nuclear stress test 08/22/2014    Negative for ischemia and scars; LVEF 77%.     • Hyperlipidemia    • Hypertension    • Impacted cerumen of both ears    • Migraine    • Sinusitis    • Tobacco abuse     quit 4 days ago.     • Urticaria        Past Surgical History:   Procedure Laterality Date   • DENTAL PROCEDURE     • NO PAST SURGERIES         Family History: family history includes ADD / ADHD in her other; Anxiety disorder in her other; Arthritis in her other; Depression in her other; Diabetes in her other; Heart disease in her other; Hyperlipidemia in her other; Hypertension in her other; Lung cancer in her other; Osteoporosis in her other.     Social History:  reports that she quit smoking about 4 years ago. She has never used smokeless tobacco. She reports that she drinks about 0.6 oz of alcohol per week. She reports that she does not use drugs.    Medications:  Medications Prior to Admission   Medication Sig Dispense Refill Last Dose   • amLODIPine (NORVASC) 10 MG tablet Take 1 tablet by mouth Daily. 30 tablet 0    • Ascorbic Acid (VITAMIN C PO) Vitamin C TABS   Taking   • aspirin 325 MG tablet Take 1 tablet by mouth daily.   Taking   • BD PEN NEEDLE TOOTIE U/F 32G X 4 MM misc USE ONE NEEDLE WITH INSULIN 4 TIMES DAILY 100 each 1 Taking   • Blood Glucose Monitoring Suppl (FREESTYLE FREEDOM LITE) W/DEVICE kit 3 (three) times a day. TEST; For: Diabetic hypoglycemia, Diabetic peripheral neuropathy   Taking   • CALCIUM PO Take  by mouth.   Taking   • carbamide peroxide (DEBROX) 6.5 % otic solution Use as directed; For: Impacted cerumen of both  ears   Taking   • Cyanocobalamin (VITAMIN B12 PO) Vitamin B12 TABS   Taking   • FREESTYLE LITE test strip USE ONE STRIP THREE TIMES DAILY 100 each 0    • glipiZIDE (GLUCOTROL) 5 MG ER tablet Take 1 tablet by mouth Daily. as directed; For: Diabetic peripheral neuropathy 30 tablet 11 Taking   • glucagon (GLUCAGON EMERGENCY) 1 MG injection Use as directed; For: Diabetics mellitus   Taking   • hydrochlorothiazide (MICROZIDE) 12.5 MG capsule Take 1 capsule by mouth Daily. 30 capsule 6 Taking   • Insulin Glargine (LANTUS SOLOSTAR) 100 UNIT/ML injection pen Inject 10-12 Units under the skin every night. at bedtime Titrate up as necessary per sliding scale; For: Diabetic peripheral neuropathy   Taking   • Lancets (FREESTYLE) lancets USE ONE TO CHECK BLOOD GLUCOSE THREE TIMES DAILY 100 each 0    • lidocaine-prilocaine (EMLA) 2.5-2.5 % cream    Taking   • lisinopril (PRINIVIL,ZESTRIL) 10 MG tablet Take 1 tablet by mouth Daily. 30 tablet 0    • pravastatin (PRAVACHOL) 20 MG tablet Take 1 tablet by mouth daily.   Taking   • cyclobenzaprine (FLEXERIL) 10 MG tablet Take 1 tablet by mouth 3 (Three) Times a Day As Needed for muscle spasms. 30 tablet 2    • esomeprazole (NexIUM) 40 MG capsule Take 1 capsule by mouth daily.   Taking   • gabapentin (NEURONTIN) 300 MG capsule TAKE ONE CAPSULE BY MOUTH ONCE DAILY 30 capsule 0 Taking   • guaiFENesin (MUCINEX) 600 MG 12 hr tablet Take 1 tablet by mouth every 12 (twelve) hours. For: Acute bronchitis   Taking   • isosorbide mononitrate (IMDUR) 60 MG 24 hr tablet Take 1 tablet by mouth Daily. 30 tablet 2 Taking   • MethylPREDNISolone (MEDROL, SAMANTHA,) 4 MG tablet Take as directed on package instructions. 21 tablet 0    • metoprolol tartrate (LOPRESSOR) 50 MG tablet Take 1 tablet by mouth 2 (Two) Times a Day. 60 tablet 0    • ondansetron ODT (ZOFRAN-ODT) 4 MG disintegrating tablet Take 1 tablet by mouth Every 6 (Six) Hours As Needed for Nausea or Vomiting. 15 tablet 0    • Prenatal Vit-Fe  Fumarate-FA (PRENATAL PO) Prenatal TABS   Taking   • traMADol (ULTRAM) 50 MG tablet Take 1 tablet by mouth Every 8 (Eight) Hours As Needed for severe pain (7-10). 45 tablet 0    • triamcinolone (KENALOG) 0.1 % cream Apply  topically 2 (two) times a day. Apply and rub in a thin film to affected areas twice daily.(AM and PM); For: Urticaria   Taking   • VENTOLIN  (90 BASE) MCG/ACT inhaler INHALE ONE TO TWO PUFFS BY MOUTH EVERY 4 TO 6 HOURS AS NEEDED AS DIRECTED 18 g 2 Taking   • vitamin D (ERGOCALCIFEROL) 78376 UNITS capsule capsule TAKE ONE CAPSULE BY MOUTH ONCE EVERY 7 DAYS 12 capsule 0        No Known Allergies    Objective   Objective     Vital Signs:   Temp:  [98.3 °F (36.8 °C)] 98.3 °F (36.8 °C)  Heart Rate:  [] 99  Resp:  [18] 18  BP: (172-255)/() 173/97        Physical Exam   Constitutional: She is oriented to person, place, and time. She appears well-developed and well-nourished. No distress.   Alert and oriented x4   HENT:   Head: Normocephalic and atraumatic.   Eyes: Conjunctivae and EOM are normal. Pupils are equal, round, and reactive to light. Right eye exhibits no discharge. Left eye exhibits no discharge. No scleral icterus.   Neck: Normal range of motion. Neck supple. No JVD present. No tracheal deviation present. No thyromegaly present.   Cardiovascular: Normal rate, regular rhythm, normal heart sounds and intact distal pulses. Exam reveals no gallop and no friction rub.   No murmur heard.  Pulmonary/Chest: Effort normal and breath sounds normal. No stridor. No respiratory distress. She has no wheezes. She has no rales. She exhibits no tenderness.   Abdominal: Soft. Bowel sounds are normal. She exhibits no distension and no mass. There is tenderness. There is no rebound and no guarding. No hernia.   Generalized tenderness throughout.    Musculoskeletal: Normal range of motion. She exhibits no edema, tenderness or deformity.   Lymphadenopathy:     She has no cervical adenopathy.    Neurological: She is alert and oriented to person, place, and time.   Skin: Skin is warm and dry. She is not diaphoretic. No erythema. No pallor.   Psychiatric: She has a normal mood and affect. Her behavior is normal. Judgment and thought content normal.   Nursing note and vitals reviewed.       Results Reviewed:  I have personally reviewed current lab, radiology, and data and agree.    Results from last 7 days   Lab Units 05/29/19  0427   WBC 10*3/mm3 12.12*   HEMOGLOBIN g/dL 14.7   HEMATOCRIT % 42.2   PLATELETS 10*3/mm3 327     Results from last 7 days   Lab Units 05/28/19  2340   SODIUM mmol/L 138   POTASSIUM mmol/L 3.2*   CHLORIDE mmol/L 93*   CO2 mmol/L 20.0*   BUN mg/dL 26*   CREATININE mg/dL 0.92   GLUCOSE mg/dL 360*   CALCIUM mg/dL 10.3   ALT (SGPT) U/L 18   AST (SGOT) U/L 21     No results found for: BNP  pH, Arterial   Date Value Ref Range Status   05/29/2019 7.553 (H) 7.350 - 7.450 pH units Final     Comment:     83 Value above reference range     Imaging Results (last 24 hours)     Procedure Component Value Units Date/Time    CT Abdomen Pelvis With Contrast [[id number]] Collected:  05/29/19 0213     Updated:  05/29/19 0215    Narrative:       CT ABDOMEN AND PELVIS WITH CONTRAST, 5/29/2019    HISTORY:  58-year-old female in the ED complaining of 3 day history nausea and vomiting. Weakness.    TECHNIQUE:  CT imaging of the abdomen and pelvis with IV contrast (including delayed phase imaging). GI contrast was not ordered. Radiation dose reduction techniques included automated exposure control. Radiation audit for CT and nuclear cardiology exams in the last  12 months: 0.    ABDOMEN FINDINGS:  Liver, pancreas and spleen are normal in size and appearance. No gallbladder distention or bile duct dilatation.    Several tiny benign-appearing left renal cysts. Both kidneys are otherwise negative with no evidence of urinary obstruction and normal parenchymal enhancement. Mild nodular enlargement of the left  adrenal gland with contrast washout characteristics  compatible with benign adrenal adenoma. Normal caliber abdominal aorta.    Mild scattered colonic diverticulosis. Small bowel and colon are otherwise normal in caliber and appearance, as imaged. The appendix is normal. The stomach is nondistended, and there is no distal esophageal dilatation.    PELVIS FINDINGS:  Uterus, adnexal regions, urinary bladder and rectum are within normal limits. No inguinal hernia.    Lung base images show no active disease.      Impression:       1. No acute abnormality within the abdomen or pelvis.  2. Mild scattered colonic diverticulosis. Normal appendix.    Signer Name: Mook Lang MD   Signed: 5/29/2019 2:13 AM   Workstation Name: [id number]                Assessment/Plan   Assessment / Plan     Active Hospital Problems    Diagnosis   • **Sepsis (CMS/HCC)   • UTI (urinary tract infection)   • Non-intractable vomiting with nausea   • DKA (diabetic ketoacidoses) (CMS/HCC)   • Diabetic peripheral neuropathy (CMS/HCC)   • Gastroesophageal reflux disease   • Hyperlipidemia   • Hypertension         Assessment & Plan:  58 year old female with PMH of HTN, HLD, T2DM presented to the ED with intractable nausea/vomiting that has been ongoing for four days. Pt found to be in DKA on admission and meets SIRS criteria (tachycardia, leukocytosis, lactic acidosis) likely sepsis from UTI.       SIRS likely sepsis  --continue IVFs, add Rocephin  --await UCx results  --Add Blood Cx x 2  --add Phenergan for N/V  --lactic acidosis resolved    DKA  -labs as mentioned above  -currently blood glucose 118, stopped insulin drip  -continue D5 1/2 normal saline with 20K at 150/hr  -continue q1 hour fingersticks  -continue IVF, s/p 2L normal saline in the ED  -serial BMP  -change to achs coverage, may need to be adjusted   -consult diabetes educator   -check hgb A1c    Hypokalemia  -replace per protocol    Hypophosphatemia  -replace per  protocol    GERD  -continue PPI    Essential hypertension  -continue home HCTZ, lisinopril, lopressor with hold parameters  -add prn hydralazine    Hyperlipidemia  -continue statin        DVT prophylaxis:scds, lovenox    CODE STATUS:  Full code   There are no questions and answers to display.       Admission Status:  I believe this patient meets INPATIENT status due to the need for care which can only be reasonably provided in an hospital setting. Pt requiring IV antiemetics and meets sepsis criteria. Giving IV ABX and IV fluids.  In such, I feel patient’s risk for adverse outcomes and need for care warrant INPATIENT evaluation and predict the patient’s care encounter to likely last beyond 2 midnights.    Heather Alcantara, APRN   05/29/19   5:23 AM        Brief Attending Admission Attestation     I have seen and examined the patient, performing an independent face-to-face diagnostic evaluation with plan of care reviewed and developed with Ms. Alcantara.     Brief Summary Statement:   Thelma Michael is a 58 y.o. female with PMH of HTN, HLD, T2DM who presents with three days og abdominal pain, nausea and vomiting. Pt also endorses subjective fever and chills. She denies any dysuria but does note increased urinary frequency.  In the ED, she was found to have DKA with FSBS of 344 with elevated anion gap of 25. She was initiated on IV Insulin gtt, but this has since been weaned off. This am, she continues to complain of nausea despite IV Zofran.     Remainder of detailed HPI is as noted above and has been reviewed and/or edited by me for completeness.      Attending Physical Exam:  Constitutional: No acute distress, awake, alert  HENT: NCAT, mucous membranes dry  Respiratory: Clear to auscultation bilaterally, respiratory effort normal   Cardiovascular: RRR, no murmurs, rubs, or gallops, palpable pedal pulses bilaterally  Gastrointestinal: Positive bowel sounds, soft, nontender, nondistended  Musculoskeletal: No  bilateral ankle edema  Psychiatric: Appropriate affect, cooperative  Neurologic: Oriented x 3, strength symmetric in all extremities, Cranial Nerves grossly intact to confrontation, speech clear  Skin: No rashes    Brief Assessment/Plan :  See above for further detailed assessment and plan developed with APC which I have reviewed and/or edited for completeness.      Electronically signed by Rubi Worthy MD, 05/29/19, 10:48 AM.         Electronically signed by Rubi Worthy MD at 5/29/2019 10:53 AM          Emergency Department Notes      Abisai Pierson,  at 5/28/2019 11:49 PM          Subjective   Ms. Thelma Michael is a 58 y.o. female with a history of DM who presents to the ED via EMS with complaints of hyperglycemia. She reports that for the past 4 days she has had nausea, vomiting, liquid diarrhea, weakness, fatigue, and hyperglycemia, which prompted presentation to the ED. She notes that her blood sugar was in the 200s yesterday and her last blood sugar today was 313. She indicates that she has been taking her oral diabetes medication but she has not been taking her insulin because she has not been eating. She has a history of GERD, HLD, HTN, and a heart murmur. No other acute complaints at this time.         History provided by:  Patient  Hyperglycemia   Blood sugar level PTA:  313  Severity:  Moderate  Duration:  4 days  Timing:  Constant  Chronicity:  New  Diabetes status:  Controlled with insulin and controlled with oral medications  Associated symptoms: fatigue, nausea, vomiting and weakness        Review of Systems   Constitutional: Positive for fatigue.   Gastrointestinal: Positive for diarrhea, nausea and vomiting.   Neurological: Positive for weakness.   All other systems reviewed and are negative.      Past Medical History:   Diagnosis Date   • Acid reflux    • Acute bronchitis    • Cardiac murmur    • Diabetes mellitus (CMS/[facility])    • H/O echocardiogram 08/07/2012    i. LVEF 65%.ii.  Mild LVH.iii. Borderline evidence of atrial septal aneurysm.  No PFO.    • History of nuclear stress test 2014    Negative for ischemia and scars; LVEF 77%.     • Hyperlipidemia    • Hypertension    • Impacted cerumen of both ears    • Migraine    • Sinusitis    • Tobacco abuse     quit 4 days ago.     • Urticaria        No Known Allergies    Past Surgical History:   Procedure Laterality Date   • DENTAL PROCEDURE     • NO PAST SURGERIES         Family History   Problem Relation Age of Onset   • Anxiety disorder Other    • Arthritis Other    • ADD / ADHD Other    • Heart disease Other         cardiac disorder   • Depression Other    • Diabetes Other    • Hyperlipidemia Other    • Hypertension Other    • Lung cancer Other    • Osteoporosis Other        Social History     Socioeconomic History   • Marital status:      Spouse name: Not on file   • Number of children: Not on file   • Years of education: Not on file   • Highest education level: Not on file   Tobacco Use   • Smoking status: Former Smoker     Last attempt to quit: 2015     Years since quittin.3   • Smokeless tobacco: Never Used   • Tobacco comment: BC PL never smoker    Substance and Sexual Activity   • Alcohol use: Yes     Alcohol/week: 0.6 oz     Types: 1 Glasses of wine per week     Comment: ocassional   • Drug use: No   • Sexual activity: Defer     Comment: Single          Objective   Physical Exam   Constitutional: She is oriented to person, place, and time. She appears well-developed and well-nourished. No distress.   The patient appears nauseous and is gagging during my exam.    HENT:   Head: Normocephalic and atraumatic.   Nose: Nose normal.   Eyes: Conjunctivae are normal. No scleral icterus.   Neck: Normal range of motion. Neck supple.   Cardiovascular: Regular rhythm and normal heart sounds. Tachycardia present.   No murmur heard.  Mild tachycardia.    Pulmonary/Chest: Effort normal and breath sounds normal. No respiratory  distress.   Abdominal: Soft. Bowel sounds are normal. There is tenderness. There is no rebound and no guarding.   Patient has mild upper abdominal tenderness to palpation with no guarding or rebound. Bowel sounds are present.    Musculoskeletal: Normal range of motion.   Neurological: She is alert and oriented to person, place, and time.   Skin: Skin is warm and dry. She is not diaphoretic.   Psychiatric: She has a normal mood and affect. Her behavior is normal.   Nursing note and vitals reviewed.      Procedures        ED Course  ED Course as of May 29 0555   Wed May 29, 2019   0307 Paged the hospitalist. -CP  [AT]   6618 Discussed the case with Dr. Mercado, hospitalist, who agrees with plan for admission. -CP  [AT]      ED Course User Index  [AT] Ale White       Recent Results (from the past 24 hour(s))   POC Glucose Once    Collection Time: 05/28/19 11:32 PM   Result Value Ref Range    Glucose 344 (H) 70 - 130 mg/dL   Comprehensive Metabolic Panel    Collection Time: 05/28/19 11:40 PM   Result Value Ref Range    Glucose 360 (H) 65 - 99 mg/dL    BUN 26 (H) 8 - 23 mg/dL    Creatinine 0.92 0.57 - 1.00 mg/dL    Sodium 138 136 - 145 mmol/L    Potassium 3.2 (L) 3.5 - 5.2 mmol/L    Chloride 93 (L) 98 - 107 mmol/L    CO2 20.0 (L) 22.0 - 29.0 mmol/L    Calcium 10.3 8.6 - 10.5 mg/dL    Total Protein 8.2 6.0 - 8.5 g/dL    Albumin 4.60 3.50 - 5.20 g/dL    ALT (SGPT) 18 1 - 33 U/L    AST (SGOT) 21 1 - 32 U/L    Alkaline Phosphatase 98 39 - 117 U/L    Total Bilirubin 1.2 0.2 - 1.2 mg/dL    eGFR  African Amer 75 >60 mL/min/1.73    Globulin 3.6 gm/dL    A/G Ratio 1.3 g/dL    BUN/Creatinine Ratio 28.3 (H) 7.0 - 25.0    Anion Gap 25.0 mmol/L   Light Blue Top    Collection Time: 05/28/19 11:40 PM   Result Value Ref Range    Extra Tube hold for add-on    Green Top (Gel)    Collection Time: 05/28/19 11:40 PM   Result Value Ref Range    Extra Tube Hold for add-ons.    Lavender Top    Collection Time: 05/28/19 11:40 PM   Result  Value Ref Range    Extra Tube hold for add-on    Gold Top - SST    Collection Time: 05/28/19 11:40 PM   Result Value Ref Range    Extra Tube Hold for add-ons.    CBC Auto Differential    Collection Time: 05/28/19 11:40 PM   Result Value Ref Range    WBC 11.56 (H) 3.40 - 10.80 10*3/mm3    RBC 4.86 3.77 - 5.28 10*6/mm3    Hemoglobin 15.3 12.0 - 15.9 g/dL    Hematocrit 42.1 34.0 - 46.6 %    MCV 86.6 79.0 - 97.0 fL    MCH 31.5 26.6 - 33.0 pg    MCHC 36.3 (H) 31.5 - 35.7 g/dL    RDW 12.9 12.3 - 15.4 %    RDW-SD 41.0 37.0 - 54.0 fl    MPV 10.6 6.0 - 12.0 fL    Platelets 344 140 - 450 10*3/mm3    Neutrophil % 81.9 (H) 42.7 - 76.0 %    Lymphocyte % 12.5 (L) 19.6 - 45.3 %    Monocyte % 5.2 5.0 - 12.0 %    Eosinophil % 0.0 (L) 0.3 - 6.2 %    Basophil % 0.1 0.0 - 1.5 %    Immature Grans % 0.3 0.0 - 0.5 %    Neutrophils, Absolute 9.48 (H) 1.70 - 7.00 10*3/mm3    Lymphocytes, Absolute 1.44 0.70 - 3.10 10*3/mm3    Monocytes, Absolute 0.60 0.10 - 0.90 10*3/mm3    Eosinophils, Absolute 0.00 0.00 - 0.40 10*3/mm3    Basophils, Absolute 0.01 0.00 - 0.20 10*3/mm3    Immature Grans, Absolute 0.03 0.00 - 0.05 10*3/mm3   Beta Hydroxybutyrate Quantitative    Collection Time: 05/28/19 11:40 PM   Result Value Ref Range    Beta-Hydroxybutyrate Quant 3.147 (H) 0.020 - 0.270 mmol/L   Magnesium    Collection Time: 05/28/19 11:40 PM   Result Value Ref Range    Magnesium 2.3 1.6 - 2.4 mg/dL   Urinalysis With Microscopic If Indicated (No Culture) - Urine, Clean Catch    Collection Time: 05/29/19  2:05 AM   Result Value Ref Range    Color, UA Yellow Yellow, Straw    Appearance, UA Cloudy (A) Clear    pH, UA 5.5 5.0 - 8.0    Specific Gravity, UA 1.029 1.001 - 1.030    Glucose, UA >=1000 mg/dL (3+) (A) Negative    Ketones, UA 40 mg/dL (2+) (A) Negative    Bilirubin, UA Negative Negative    Blood, UA Moderate (2+) (A) Negative    Protein, UA >=300 mg/dL (3+) (A) Negative    Leuk Esterase, UA Small (1+) (A) Negative    Nitrite, UA Negative Negative     Urobilinogen, UA 1.0 E.U./dL 0.2 - 1.0 E.U./dL   Urinalysis, Microscopic Only - Urine, Clean Catch    Collection Time: 05/29/19  2:05 AM   Result Value Ref Range    RBC, UA 3-6 (A) None Seen, 0-2 /HPF    WBC, UA 31-50 (A) None Seen, 0-2 /HPF    Bacteria, UA 3+ (A) None Seen, Trace /HPF    Squamous Epithelial Cells, UA 3-6 (A) None Seen, 0-2 /HPF    Methodology Manual Light Microscopy    POC Glucose Once    Collection Time: 05/29/19  3:58 AM   Result Value Ref Range    Glucose 244 (H) 70 - 130 mg/dL   Blood Gas, Arterial With Co-Ox    Collection Time: 05/29/19  4:04 AM   Result Value Ref Range    Site Left Radial     Sherwin's Test Positive     pH, Arterial 7.553 (H) 7.350 - 7.450 pH units    pCO2, Arterial 25.8 (L) 35.0 - 45.0 mm Hg    pO2, Arterial 112.0 (H) 83.0 - 108.0 mm Hg    HCO3, Arterial 22.7 20.0 - 26.0 mmol/L    Base Excess, Arterial 1.8 0.0 - 2.0 mmol/L    Hemoglobin, Blood Gas 14.8 14 - 18 g/dL    Hematocrit, Blood Gas 45.4 %    Oxyhemoglobin 96.8 94 - 99 %    Methemoglobin 1.10 0.00 - 1.50 %    Carboxyhemoglobin 1.4 0 - 2 %    CO2 Content 23.5 23 - 27 mmol/L    Temperature 37.0 C    Barometric Pressure for Blood Gas  mmHg    Modality Room Air     FIO2 21 %    Rate 17 Breaths/minute    Note      pH, Temp Corrected 7.553 pH Units    pCO2, Temperature Corrected 25.8 (L) 35 - 45 mm Hg    pO2, Temperature Corrected 112 (H) 83 - 108 mm Hg   Comprehensive Metabolic Panel    Collection Time: 05/29/19  4:27 AM   Result Value Ref Range    Glucose 158 (H) 65 - 99 mg/dL    BUN 22 8 - 23 mg/dL    Creatinine 0.85 0.57 - 1.00 mg/dL    Sodium 142 136 - 145 mmol/L    Potassium 2.5 (C) 3.5 - 5.2 mmol/L    Chloride 104 98 - 107 mmol/L    CO2 20.0 (L) 22.0 - 29.0 mmol/L    Calcium 9.2 8.6 - 10.5 mg/dL    Total Protein 7.4 6.0 - 8.5 g/dL    Albumin 4.10 3.50 - 5.20 g/dL    ALT (SGPT) 16 1 - 33 U/L    AST (SGOT) 19 1 - 32 U/L    Alkaline Phosphatase 85 39 - 117 U/L    Total Bilirubin 0.8 0.2 - 1.2 mg/dL    eGFR  African Amer  83 >60 mL/min/1.73    Globulin 3.3 gm/dL    A/G Ratio 1.2 g/dL    BUN/Creatinine Ratio 25.9 (H) 7.0 - 25.0    Anion Gap 18.0 mmol/L   Lactic Acid, Plasma    Collection Time: 05/29/19  4:27 AM   Result Value Ref Range    Lactate 3.6 (C) 0.5 - 2.0 mmol/L   Phosphorus    Collection Time: 05/29/19  4:27 AM   Result Value Ref Range    Phosphorus 1.4 (C) 2.5 - 4.5 mg/dL   TSH    Collection Time: 05/29/19  4:27 AM   Result Value Ref Range    TSH 0.826 0.270 - 4.200 mIU/mL   CBC Auto Differential    Collection Time: 05/29/19  4:27 AM   Result Value Ref Range    WBC 12.12 (H) 3.40 - 10.80 10*3/mm3    RBC 4.74 3.77 - 5.28 10*6/mm3    Hemoglobin 14.7 12.0 - 15.9 g/dL    Hematocrit 42.2 34.0 - 46.6 %    MCV 89.0 79.0 - 97.0 fL    MCH 31.0 26.6 - 33.0 pg    MCHC 34.8 31.5 - 35.7 g/dL    RDW 13.0 12.3 - 15.4 %    RDW-SD 42.6 37.0 - 54.0 fl    MPV 10.8 6.0 - 12.0 fL    Platelets 327 140 - 450 10*3/mm3    Neutrophil % 77.0 (H) 42.7 - 76.0 %    Lymphocyte % 15.7 (L) 19.6 - 45.3 %    Monocyte % 7.2 5.0 - 12.0 %    Eosinophil % 0.0 (L) 0.3 - 6.2 %    Basophil % 0.1 0.0 - 1.5 %    Immature Grans % 0.3 0.0 - 0.5 %    Neutrophils, Absolute 9.34 (H) 1.70 - 7.00 10*3/mm3    Lymphocytes, Absolute 1.90 0.70 - 3.10 10*3/mm3    Monocytes, Absolute 0.87 0.10 - 0.90 10*3/mm3    Eosinophils, Absolute 0.00 0.00 - 0.40 10*3/mm3    Basophils, Absolute 0.01 0.00 - 0.20 10*3/mm3    Immature Grans, Absolute 0.04 0.00 - 0.05 10*3/mm3   POC Glucose Once    Collection Time: 05/29/19  4:51 AM   Result Value Ref Range    Glucose 118 70 - 130 mg/dL     Note: In addition to lab results from this visit, the labs listed above may include labs taken at another facility or during a different encounter within the last 24 hours. Please correlate lab times with ED admission and discharge times for further clarification of the services performed during this visit.    CT Abdomen Pelvis With Contrast   Final Result   1. No acute abnormality within the abdomen or  "pelvis.   2. Mild scattered colonic diverticulosis. Normal appendix.      Signer Name: Mook Lang MD    Signed: 5/29/2019 2:13 AM    Workstation Name: [email or web address]            Vitals:    05/29/19 0353 05/29/19 0400 05/29/19 0446 05/29/19 0447   BP: 176/72 (!) 184/79 172/87 173/97   BP Location:   Right arm Left arm   Patient Position:   Lying Lying   Pulse: 93 97 99    Resp:   18    Temp:   98.3 °F (36.8 °C)    TempSrc:   Oral    SpO2: 96% 99% 98%    Weight:    57.6 kg (127 lb)   Height:    172.7 cm (68\")     Medications   sodium chloride 0.9 % flush 10 mL (not administered)   potassium chloride 10 mEq in 100 mL IVPB (10 mEq Intravenous New Bag 5/29/19 0342)     And   potassium chloride 10 mEq in 100 mL IVPB (10 mEq Intravenous New Bag 5/29/19 0515)     And   potassium chloride 10 mEq in 100 mL IVPB (not administered)     And   potassium chloride 10 mEq in 100 mL IVPB (not administered)   dextrose 5 % and sodium chloride 0.45 % with KCl 20 mEq/L infusion (150 mL/hr Intravenous New Bag 5/29/19 0514)   potassium chloride (MICRO-K) CR capsule 40 mEq (not administered)     Or   potassium chloride (KLOR-CON) packet 40 mEq (not administered)     Or   potassium chloride 10 mEq in 100 mL IVPB (not administered)   potassium chloride (MICRO-K) CR capsule 20 mEq (not administered)     Or   potassium chloride (KLOR-CON) packet 20 mEq (not administered)     Or   potassium chloride 10 mEq in 100 mL IVPB (not administered)   potassium chloride (MICRO-K) CR capsule 10 mEq (not administered)     Or   potassium chloride (KLOR-CON) packet 10 mEq (not administered)     Or   potassium chloride 10 mEq in 100 mL IVPB (not administered)   hydrALAZINE (APRESOLINE) injection 10 mg (not administered)   ondansetron (ZOFRAN) injection 4 mg (not administered)   amLODIPine (NORVASC) tablet 10 mg (not administered)   vitamin C (ASCORBIC ACID) tablet 500 mg (not administered)   aspirin tablet 325 mg (not administered)   vitamin B-12 " (CYANOCOBALAMIN) tablet 50 mcg (not administered)   hydrochlorothiazide (HYDRODIURIL) tablet 12.5 mg (not administered)   lisinopril (PRINIVIL,ZESTRIL) tablet 10 mg (not administered)   pravastatin (PRAVACHOL) tablet 20 mg (not administered)   sodium chloride 0.9 % flush 3 mL (not administered)   sodium chloride 0.9 % flush 3-10 mL (not administered)   enoxaparin (LOVENOX) syringe 40 mg (not administered)   acetaminophen (TYLENOL) tablet 650 mg (not administered)   dextrose (GLUTOSE) oral gel 15 g (not administered)   dextrose (D50W) 25 g/ 50mL Intravenous Solution 25 g (not administered)   glucagon (human recombinant) (GLUCAGEN DIAGNOSTIC) injection 1 mg (not administered)   insulin lispro (humaLOG) injection 0-7 Units (not administered)   potassium & sodium phosphates (PHOS-NAK) 280-160-250 MG packet - for Phosphorus less than 1.25 mg/dL (not administered)     Or   potassium & sodium phosphates (PHOS-NAK) 280-160-250 MG packet - for Phosphorus 1.25 - 2.5 mg/dL (not administered)   sodium chloride 0.9 % bolus 1,000 mL (0 mL Intravenous Stopped 5/29/19 0341)   ondansetron (ZOFRAN) injection 4 mg (4 mg Intravenous Given 5/29/19 0113)   iopamidol (ISOVUE-370) 76 % injection 100 mL (100 mL Intravenous Given 5/29/19 0112)   insulin regular (humuLIN R,novoLIN R) injection 8 Units (8 Units Intravenous Given 5/29/19 0329)   hydrALAZINE (APRESOLINE) injection 10 mg (10 mg Intravenous Given 5/29/19 0342)   sodium chloride 0.9 % bolus 1,000 mL (1,000 mL Intravenous New Bag 5/29/19 0341)     ECG/EMG Results (last 24 hours)     ** No results found for the last 24 hours. **        No orders to display     I discussed the case with Dr. Mercado, internal medicine.                MDM    Final diagnoses:   Non-intractable vomiting with nausea, unspecified vomiting type   Hypertension, unspecified type   Hyperglycemia   Hypokalemia   Generalized weakness       Documentation assistance provided by mylene White.  Information  recorded by the scribe was done at my direction and has been verified and validated by me.     Ale White JENNIFER  19       Abisai Pierson DO  19      Electronically signed by Abisai Pierson DO at 2019  8:42 PM       ICU Vital Signs    No documentation.         Operative/Procedure Notes (all)     No notes of this type exist for this encounter.           Physician Progress Notes (all)      Rubi Worthy MD at 2019  9:06 AM              [facility] Medicine Services  PROGRESS NOTE    Patient Name: Thelma Michael  : 1960  MRN: [id number]    Date of Admission: 2019  Length of Stay: 1  Primary Care Physician: Sharita García DO (Inactive)    Subjective   Subjective     CC:  N/V    HPI:  Pt states that her nausea is better, no further emesis. Would like to advance diet but is hesitant of what she will be able to tolerate.     Review of Systems  Gen- No fevers, chills  CV- No chest pain, palpitations  Resp- No cough, dyspnea  GI- No N/V/D, abd pain    Otherwise ROS is negative except as mentioned in the HPI.    Objective   Objective     Vital Signs:   Temp:  [97.9 °F (36.6 °C)-99.7 °F (37.6 °C)] 99.2 °F (37.3 °C)  Heart Rate:  [] 98  Resp:  [18] 18  BP: (166-191)/(77-94) 190/84        Physical Exam:  Constitutional: No acute distress, awake, alert  HENT: NCAT, mucous membranes moist, poor dentition  Respiratory: Clear to auscultation bilaterally, respiratory effort normal   Cardiovascular: RRR, no murmurs, rubs, or gallops, palpable pedal pulses bilaterally  Gastrointestinal: Positive bowel sounds, soft, nontender, nondistended  Musculoskeletal: No bilateral ankle edema  Psychiatric: Appropriate affect, cooperative  Neurologic: Oriented x 3, strength symmetric in all extremities, Cranial Nerves grossly intact to confrontation, speech clear  Skin: No rashes    Results Reviewed:  I have personally reviewed current lab, radiology, and data  and agree.    Results from last 7 days   Lab Units 05/30/19  0640 05/29/19  0858 05/29/19  0427   WBC 10*3/mm3 10.39 13.68* 12.12*   HEMOGLOBIN g/dL 14.3 14.4 14.7   HEMATOCRIT % 41.2 41.4 42.2   PLATELETS 10*3/mm3 304 326 327     Results from last 7 days   Lab Units 05/30/19  0640 05/29/19  1538 05/29/19  1148  05/29/19  0427 05/28/19  2340   SODIUM mmol/L 137 143 138   < > 142 138   POTASSIUM mmol/L 3.6 3.0* 3.1*   < > 2.5* 3.2*   CHLORIDE mmol/L 104 105 101   < > 104 93*   CO2 mmol/L 21.0* 26.0 22.0   < > 20.0* 20.0*   BUN mg/dL 10 14 18   < > 22 26*   CREATININE mg/dL 0.69 0.71 0.87   < > 0.85 0.92   GLUCOSE mg/dL 171* 76 283*   < > 158* 360*   CALCIUM mg/dL 8.9 9.4 9.3   < > 9.2 10.3   ALT (SGPT) U/L  --   --   --   --  16 18   AST (SGOT) U/L  --   --   --   --  19 21    < > = values in this interval not displayed.     Estimated Creatinine Clearance: 79.7 mL/min (by C-G formula based on SCr of 0.69 mg/dL).    Microbiology Results Abnormal     Procedure Component Value - Date/Time    Urine Culture - Urine, Urine, Clean Catch [[id number]] Collected:  05/29/19 0205    Lab Status:  Final result Specimen:  Urine, Clean Catch Updated:  05/30/19 0849     Urine Culture >100,000 CFU/mL Mixed Sameera Isolated    Narrative:       Specimen contains mixed organisms of questionable pathogenicity which indicates contamination with commensal sameera.  Further identification is unlikely to provide clinically useful information.  Suggest recollection.    Blood Culture - Blood, Hand, Left [[id number]] Collected:  05/29/19 1148    Lab Status:  Preliminary result Specimen:  Blood from Hand, Left Updated:  05/29/19 1226     Blood Culture Aerobic bottle only    Blood Culture - Blood, Arm, Left [[id number]] Collected:  05/29/19 1148    Lab Status:  Preliminary result Specimen:  Blood from Arm, Left Updated:  05/29/19 1225     Blood Culture Aerobic bottle only          Imaging Results (last 24 hours)     ** No results found for the last 24  hours. **               I have reviewed the medications:    Current Facility-Administered Medications:   •  acetaminophen (TYLENOL) tablet 650 mg, 650 mg, Oral, Q4H PRN, Quinton, Heather, APRN, 650 mg at 05/29/19 2006  •  amLODIPine (NORVASC) tablet 10 mg, 10 mg, Oral, Daily, Quinton, Heather, APRN, 10 mg at 05/30/19 1006  •  aspirin tablet 325 mg, 325 mg, Oral, Daily, Quinton, Heather, APRN, 325 mg at 05/30/19 1003  •  cefTRIAXone (ROCEPHIN) 1 g/100 mL 0.9% NS (MBP), 1 g, Intravenous, Q24H, Rubi Worthy MD, 1 g at 05/29/19 1235  •  dextrose (D50W) 25 g/ 50mL Intravenous Solution 25 g, 25 g, Intravenous, Q15 Min PRN, Quinton, Heather, APRN  •  dextrose (GLUTOSE) oral gel 15 g, 15 g, Oral, Q15 Min PRN, Quinton, Heather, APRN  •  enoxaparin (LOVENOX) syringe 40 mg, 40 mg, Subcutaneous, Q24H, Quinton, Heather, APRN, 40 mg at 05/30/19 1031  •  glucagon (human recombinant) (GLUCAGEN DIAGNOSTIC) injection 1 mg, 1 mg, Subcutaneous, PRN, Quinton, Heather, APRN  •  hydrALAZINE (APRESOLINE) injection 10 mg, 10 mg, Intravenous, Q6H PRN, Kym Mercado DO, 10 mg at 05/29/19 2341  •  hydrochlorothiazide (HYDRODIURIL) tablet 12.5 mg, 12.5 mg, Oral, Daily, Quinton, Heather, APRN, 12.5 mg at 05/30/19 1032  •  insulin detemir (LEVEMIR) injection 10 Units, 10 Units, Subcutaneous, Nightly, Rubi Worthy MD, 10 Units at 05/29/19 2123  •  insulin lispro (humaLOG) injection 0-7 Units, 0-7 Units, Subcutaneous, 4x Daily With Meals & Nightly, Quinton, Heather, APRN, 3 Units at 05/29/19 2124  •  insulin lispro (humaLOG) injection 5 Units, 5 Units, Subcutaneous, TID With Meals, Rubi Worthy MD, 5 Units at 05/29/19 1214  •  lisinopril (PRINIVIL,ZESTRIL) tablet 10 mg, 10 mg, Oral, Daily, Quinton, Heather, APRN, 10 mg at 05/30/19 1032  •  ondansetron (ZOFRAN) injection 4 mg, 4 mg, Intravenous, Q6H PRN, Quinton, Heather, APRN, 4 mg at 05/29/19 0556  •  pantoprazole (PROTONIX) EC tablet 40 mg, 40 mg, Oral, BID AC,  Magali Delvalle, APRN, 40 mg at 05/30/19 0617  •  potassium & sodium phosphates (PHOS-NAK) 280-160-250 MG packet - for Phosphorus less than 1.25 mg/dL, 2 packet, Oral, Q6H PRN, Stopped at 05/29/19 1810 **OR** potassium & sodium phosphates (PHOS-NAK) 280-160-250 MG packet - for Phosphorus 1.25 - 2.5 mg/dL, 2 packet, Oral, Once PRN, Quinton, Heather, APRN  •  potassium chloride (MICRO-K) CR capsule 40 mEq, 40 mEq, Oral, PRN, 40 mEq at 05/30/19 1003 **OR** potassium chloride (KLOR-CON) packet 40 mEq, 40 mEq, Oral, PRN **OR** potassium chloride 10 mEq in 100 mL IVPB, 10 mEq, Intravenous, Q1H PRN, Rubi Worthy MD  •  pravastatin (PRAVACHOL) tablet 20 mg, 20 mg, Oral, Daily, Quinton, Heather, APRN, 20 mg at 05/30/19 1004  •  promethazine (PHENERGAN) tablet 12.5 mg, 12.5 mg, Oral, Q6H PRN, 12.5 mg at 05/29/19 1114 **OR** promethazine (PHENERGAN) injection 12.5 mg, 12.5 mg, Intravenous, Q6H PRN, Rubi Worthy MD, 12.5 mg at 05/29/19 2006  •  sodium chloride 0.9 % flush 10 mL, 10 mL, Intravenous, PRN, Abisai Pierson, DO  •  sodium chloride 0.9 % flush 3 mL, 3 mL, Intravenous, Q12H, Quinton, Heather, APRN, 3 mL at 05/30/19 1033  •  sodium chloride 0.9 % flush 3-10 mL, 3-10 mL, Intravenous, PRN, Quinton, Heather, APRN  •  sodium chloride 0.9 % infusion, 150 mL/hr, Intravenous, Continuous, Rubi Worthy MD, Last Rate: 150 mL/hr at 05/30/19 0616, 150 mL/hr at 05/30/19 0616  •  vitamin B-12 (CYANOCOBALAMIN) tablet 50 mcg, 50 mcg, Oral, Daily, Quinton, Heather, APRN, 50 mcg at 05/30/19 1004  •  vitamin C (ASCORBIC ACID) tablet 500 mg, 500 mg, Oral, Daily, Quinton, Heather, APRN, 500 mg at 05/30/19 1029      Assessment/Plan   Assessment / Plan     Active Hospital Problems    Diagnosis POA   • **Sepsis (CMS/[facility]) [A41.9] Unknown     Priority: High   • UTI (urinary tract infection) [N39.0] Unknown     Priority: High   • Non-intractable vomiting with nausea [R11.2] Yes   • DKA (diabetic ketoacidoses) (CMS/[facility])  [E13.10] Yes   • Diabetic peripheral neuropathy (CMS/HCC) [E11.42] Yes   • Gastroesophageal reflux disease [K21.9] Yes   • Hyperlipidemia [E78.5] Yes   • Hypertension [I10] Yes          Brief Hospital Course to date:  Thelma Michael is a 58 y.o. female with PMH of HTN, HLD, T2DM who presents with three days of abdominal pain, nausea and vomiting. Pt was initially in DKA upon admission as well as met SIRS criteria with tachycardia and leukocytosis. Sepsis was suspected due to possible UTI.  She was admitted to our service for further evaluation.    Plan:    SIRS likely sepsis  --continue IVFs, added Rocephin D2/3  --await UCx showing only >100K normal sameera. Will do one more day of ABX   --Blood Cx x 2 NGTD  --lactic acidosis resolved     DKA  -resolved, now off insulin gtt and on basal/bolus with SSI. Adjust as per requirements.      Hypokalemia  -replace per protocol     Hypophosphatemia  -replace per protocol     GERD  -continue PPI     Essential hypertension  -continue home HCTZ, lisinopril, lopressor with hold parameters  -add prn hydralazine     Hyperlipidemia  -continue statin      DVT Prophylaxis:  [facility]    Disposition: I expect the patient to be discharged home tomorrow    CODE STATUS:   Code Status and Medical Interventions:   Ordered at: 19 0554     Level Of Support Discussed With:    Patient     Code Status:    CPR     Medical Interventions (Level of Support Prior to Arrest):    Full         Electronically signed by Rubi Worthy MD, 19, 12:06 PM.      Electronically signed by Rubi Worthy MD at 2019 12:10 PM       Consult Notes (all)     No notes of this type exist for this encounter.           Discharge Summary      Rubi Worthy MD at 2019 10:20 AM              [facility] Medicine Services  DISCHARGE SUMMARY    Patient Name: Thelma Michael  : 1960  MRN: [id number]    Date of Admission: 2019  Date of Discharge:   5/31/2019  Primary Care Physician: Sharita García DO (Inactive)    Consults     No orders found from 4/29/2019 to 5/29/2019.          Hospital Course     Presenting Problem:   Non-intractable vomiting with nausea, unspecified vomiting type [R11.2]    Active Hospital Problems    Diagnosis  POA   • Diabetic peripheral neuropathy (CMS/HCC) [E11.42]  Yes   • Gastroesophageal reflux disease [K21.9]  Yes   • Hyperlipidemia [E78.5]  Yes   • Hypertension [I10]  Yes      Resolved Hospital Problems    Diagnosis Date Resolved POA   • **Sepsis (CMS/HCC) [A41.9] 05/31/2019 Unknown     Priority: High   • UTI (urinary tract infection) [N39.0] 05/31/2019 Unknown     Priority: High   • Non-intractable vomiting with nausea [R11.2] 05/31/2019 Yes   • DKA (diabetic ketoacidoses) (CMS/[facility]) [E13.10] 05/31/2019 Yes          Hospital Course:  Thelma Michael is a 58 y.o. female with PMH of HTN, HLD, T2DM who presents with three days of abdominal pain, nausea and vomiting. Pt was initially in DKA upon admission as well as met SIRS criteria with tachycardia and leukocytosis. Sepsis was suspected due to possible UTI.  She was admitted to our service for further evaluation. She was started on Rocephin empirically.  Urine cultures eventually just showed normal sameera, so ABX were stopped after 3 days.  Her N/V have resolved and she has tolerated advancing her diet.  In terms of her DKA, her insulin gtt was stopped upon arrival to the floor and she was transitioned to subcutaneous insulin with basal/bolus regimen and SSI as needed. Her FSBS have been decently controlled. Pt did report that she had recently lost insurance due to cost and thus had not followed up as she needed with her prior PCP. Since admission, her insurance has been re-established and she has an appointment with a PCP in the near future.  She is doing well and is anxious to return home today.                Discharge Follow Up Recommendations for  labs/diagnostics:  With PCP in one week.     Day of Discharge     HPI:   Pt doing well this am, would like to try cereal for breakfast. Tolerated full liquid diet last pm.     Review of Systems  Gen- No fevers, chills  CV- No chest pain, palpitations  Resp- No cough, dyspnea  GI- No N/V/D, abd pain  Otherwise ROS is negative except as mentioned in the HPI.    Vital Signs:   Temp:  [98.9 °F (37.2 °C)-99.6 °F (37.6 °C)] 98.9 °F (37.2 °C)  Heart Rate:  [] 89  Resp:  [18] 18  BP: (115-190)/(62-96) 154/89     Physical Exam:  Constitutional: No acute distress, awake, alert  HENT: NCAT, mucous membranes moist, poor dentition  Respiratory: Clear to auscultation bilaterally, respiratory effort normal   Cardiovascular: RRR, no murmurs, rubs, or gallops, palpable pedal pulses bilaterally  Gastrointestinal: Positive bowel sounds, soft, nontender, nondistended  Musculoskeletal: No bilateral ankle edema  Psychiatric: Appropriate affect, cooperative  Neurologic: Oriented x 3, strength symmetric in all extremities, Cranial Nerves grossly intact to confrontation, speech clear  Skin: No rashes          Pertinent  and/or Most Recent Results     Results from last 7 days   Lab Units 05/31/19  0605 05/30/19  1838 05/30/19  0640 05/29/19  1538 05/29/19  1148 05/29/19  0858 05/29/19  0427 05/28/19  2340   WBC 10*3/mm3 7.48  --  10.39  --   --  13.68* 12.12* 11.56*   HEMOGLOBIN g/dL 15.2  --  14.3  --   --  14.4 14.7 15.3   HEMATOCRIT % 45.0  --  41.2  --   --  41.4 42.2 42.1   PLATELETS 10*3/mm3 285  --  304  --   --  326 327 344   SODIUM mmol/L 140  --  137 143 138 138 142 138   POTASSIUM mmol/L 3.4* 3.5 3.6 3.0* 3.1* 3.1* 2.5* 3.2*   CHLORIDE mmol/L 102  --  104 105 101 101 104 93*   CO2 mmol/L 24.0  --  21.0* 26.0 22.0 17.0* 20.0* 20.0*   BUN mg/dL 10  --  10 14 18 20 22 26*   CREATININE mg/dL 0.66  --  0.69 0.71 0.87 0.86 0.85 0.92   GLUCOSE mg/dL 135*  --  171* 76 283* 320* 158* 360*   CALCIUM mg/dL 9.6  --  8.9 9.4 9.3 9.1 9.2  10.3     Results from last 7 days   Lab Units 05/29/19  0427 05/28/19  2340   BILIRUBIN mg/dL 0.8 1.2   ALK PHOS U/L 85 98   ALT (SGPT) U/L 16 18   AST (SGOT) U/L 19 21           Invalid input(s): TG, LDLCALC, LDLREALC  Results from last 7 days   Lab Units 05/29/19  0858 05/29/19  0427   TSH mIU/mL  --  0.826   LACTATE mmol/L 1.4 3.6*       Brief Urine Lab Results  (Last result in the past 365 days)      Color   Clarity   Blood   Leuk Est   Nitrite   Protein   CREAT   Urine HCG        05/29/19 0205 Yellow Cloudy Moderate (2+) Small (1+) Negative >=300 mg/dL (3+)               Microbiology Results Abnormal     Procedure Component Value - Date/Time    Blood Culture - Blood, Hand, Left [[id number]] Collected:  05/29/19 1148    Lab Status:  Preliminary result Specimen:  Blood from Hand, Left Updated:  05/30/19 1231     Blood Culture No growth at 24 hours      Aerobic bottle only    Blood Culture - Blood, Arm, Left [[id number]] Collected:  05/29/19 1148    Lab Status:  Preliminary result Specimen:  Blood from Arm, Left Updated:  05/30/19 1231     Blood Culture No growth at 24 hours      Aerobic bottle only    Urine Culture - Urine, Urine, Clean Catch [[id number]] Collected:  05/29/19 0205    Lab Status:  Final result Specimen:  Urine, Clean Catch Updated:  05/30/19 0849     Urine Culture >100,000 CFU/mL Mixed Sameera Isolated    Narrative:       Specimen contains mixed organisms of questionable pathogenicity which indicates contamination with commensal sameera.  Further identification is unlikely to provide clinically useful information.  Suggest recollection.          Imaging Results (all)     Procedure Component Value Units Date/Time    CT Abdomen Pelvis With Contrast [[id number]] Collected:  05/29/19 0213     Updated:  05/29/19 0215    Narrative:       CT ABDOMEN AND PELVIS WITH CONTRAST, 5/29/2019    HISTORY:  58-year-old female in the ED complaining of 3 day history nausea and vomiting. Weakness.    TECHNIQUE:  CT imaging  of the abdomen and pelvis with IV contrast (including delayed phase imaging). GI contrast was not ordered. Radiation dose reduction techniques included automated exposure control. Radiation audit for CT and nuclear cardiology exams in the last  12 months: 0.    ABDOMEN FINDINGS:  Liver, pancreas and spleen are normal in size and appearance. No gallbladder distention or bile duct dilatation.    Several tiny benign-appearing left renal cysts. Both kidneys are otherwise negative with no evidence of urinary obstruction and normal parenchymal enhancement. Mild nodular enlargement of the left adrenal gland with contrast washout characteristics  compatible with benign adrenal adenoma. Normal caliber abdominal aorta.    Mild scattered colonic diverticulosis. Small bowel and colon are otherwise normal in caliber and appearance, as imaged. The appendix is normal. The stomach is nondistended, and there is no distal esophageal dilatation.    PELVIS FINDINGS:  Uterus, adnexal regions, urinary bladder and rectum are within normal limits. No inguinal hernia.    Lung base images show no active disease.      Impression:       1. No acute abnormality within the abdomen or pelvis.  2. Mild scattered colonic diverticulosis. Normal appendix.    Signer Name: Mook Lang MD   Signed: 5/29/2019 2:13 AM   Workstation Name: [id number]                             Order Current Status    Blood Culture - Blood, Arm, Left Preliminary result    Blood Culture - Blood, Hand, Left Preliminary result        Discharge Details        Discharge Medications      New Medications      Instructions Start Date   pantoprazole 40 MG EC tablet  Commonly known as:  PROTONIX   40 mg, Oral, Daily         Continue These Medications      Instructions Start Date   amLODIPine 10 MG tablet  Commonly known as:  NORVASC   10 mg, Oral, Daily      aspirin 325 MG tablet   1 tablet, Oral, Daily      BD PEN NEEDLE TOOTIE U/F 32G X 4 MM misc  Generic drug:  Insulin Pen  Needle   USE ONE NEEDLE WITH INSULIN 4 TIMES DAILY      CALCIUM PO   Oral      DEBROX 6.5 % otic solution  Generic drug:  carbamide peroxide   Use as directed; For: Impacted cerumen of both ears      FREESTYLE FREEDOM LITE w/Device kit   Does not apply, 3 Times Daily, TEST; For: Diabetic hypoglycemia, Diabetic peripheral neuropathy      freestyle lancets   USE ONE TO CHECK BLOOD GLUCOSE THREE TIMES DAILY      FREESTYLE LITE test strip  Generic drug:  glucose blood   USE ONE STRIP THREE TIMES DAILY      glipiZIDE 5 MG ER tablet  Commonly known as:  GLUCOTROL XL   5 mg, Oral, Daily, as directed; For: Diabetic peripheral neuropathy      GLUCAGON EMERGENCY 1 MG injection  Generic drug:  glucagon   Use as directed; For: Diabetics mellitus      hydrochlorothiazide 12.5 MG capsule  Commonly known as:  MICROZIDE   12.5 mg, Oral, Daily      LANTUS SOLOSTAR 100 UNIT/ML injection pen  Generic drug:  Insulin Glargine   10-12 Units, Subcutaneous, Nightly, at bedtime Titrate up as necessary per sliding scale; For: Diabetic peripheral neuropathy      lidocaine-prilocaine 2.5-2.5 % cream  Commonly known as:  EMLA   No dose, route, or frequency recorded.      lisinopril 10 MG tablet  Commonly known as:  PRINIVIL,ZESTRIL   10 mg, Oral, Daily      pravastatin 20 MG tablet  Commonly known as:  PRAVACHOL   1 tablet, Oral, Daily      VITAMIN B12 PO   Vitamin B12 TABS      VITAMIN C PO   Vitamin C TABS         Stop These Medications    cyclobenzaprine 10 MG tablet  Commonly known as:  FLEXERIL     esomeprazole 40 MG capsule  Commonly known as:  nexIUM     gabapentin 300 MG capsule  Commonly known as:  NEURONTIN     isosorbide mononitrate 60 MG 24 hr tablet  Commonly known as:  IMDUR     metoprolol tartrate 50 MG tablet  Commonly known as:  LOPRESSOR     MUCINEX 600 MG 12 hr tablet  Generic drug:  guaiFENesin     ondansetron ODT 4 MG disintegrating tablet  Commonly known as:  ZOFRAN-ODT     PRENATAL PO     traMADol 50 MG tablet  Commonly  known as:  ULTRAM     triamcinolone 0.1 % cream  Commonly known as:  KENALOG     VENTOLIN  (90 Base) MCG/ACT inhaler  Generic drug:  albuterol sulfate HFA     vitamin D 48319 units capsule capsule  Commonly known as:  ERGOCALCIFEROL        ASK your doctor about these medications      Instructions Start Date   methylPREDNISolone 4 MG tablet  Commonly known as:  MEDROL (SAMANTHA)   Take as directed on package instructions.             No Known Allergies      Discharge Disposition:  Home or Self Care    Discharge Diet:  Diet Order   Procedures   • Diet Clear Liquid         Discharge Activity:         CODE STATUS:    Code Status and Medical Interventions:   Ordered at: 05/29/19 0554     Level Of Support Discussed With:    Patient     Code Status:    CPR     Medical Interventions (Level of Support Prior to Arrest):    Full         Future Appointments   Date Time Provider Department Center   6/7/2019  8:30 AM Monet Clark APRN [facility] None       Additional Instructions for the Follow-ups that You Need to Schedule     Discharge Follow-up with PCP   As directed       Currently Documented PCP:    Sharita García DO (Inactive)    PCP Phone Number:    None     Follow Up Details:  in one week with PCP               Time Spent on Discharge:  35 minutes    Electronically signed by Rubi Maciel MD, 05/31/19, 10:20 AM.      Electronically signed by Rubi Maciel MD at 5/31/2019 10:24 AM       Discharge Order (From admission, onward)    Start     Ordered    05/31/19 1018  Discharge patient  Once     Expected Discharge Date:  05/31/19    Discharge Disposition:  Home or Self Care    Physician of Record for Attribution - Please select from Treatment Team:  RUBI MACIEL [[id number]]    Review needed by CMO to determine Physician of Record:  No       Question Answer Comment   Physician of Record for Attribution - Please select from Treatment Team RUBI MACIEL    Review needed by  CMO to determine Physician of Record No        05/31/19 1019

## 2019-06-05 NOTE — OUTREACH NOTE
Sepsis Week 1 Survey      Responses   Facility patient discharged from?  [facility]   Does the patient have one of the following disease processes/diagnoses(primary or secondary)?  Sepsis   Is there a successful TCM telephone encounter documented?  No   Week 1 attempt successful?  Yes   Call start time  1354   Call end time  1404   Discharge diagnosis   Sepsis    Is patient permission given to speak with other caregiver?  Yes   List who call center can speak with  son   Meds reviewed with patient/caregiver?  Yes   Is the patient having any side effects they believe may be caused by any medication additions or changes?  No   Does the patient have all medications related to this admission filled (includes all antibiotics, inhalers, nebulizers,steroids,etc.)  Yes   Is the patient taking all medications as directed (includes completed medication regime)?  Yes   Does the patient have a primary care provider?   Yes   Does the patient have an appointment with their PCP within 7 days of discharge?  Yes   Has the patient kept scheduled appointments due by today?  N/A   Psychosocial issues?  No   Comments  Denies N/V or abd pain. Is constipated, trying to adjust diet with prunes to help before adding stool softener. Staying hydrated. No issues urinating. Still weak and decreased appetite.    Did the patient receive a copy of their discharge instructions?  Yes   Nursing interventions  Reviewed instructions with patient   What is the patient's perception of their health status since discharge?  Improving   Nursing interventions  Nurse provided patient education   Is the patient/caregiver able to teach back Sepsis?  S - Shivering,fever or very cold, P - Pale or discolored skin, S - Sleepy, difficult to arouse,confused, S - Short of breath   Nursing interventions  Nurse provided patient education   Is patient/caregiver able to teach back steps to recovery at home?  Rest and regain strength, Set small, achievable goals for return  to baseline health, Record milestones and struggles in a journal, Learn about sepsis(sepsis.org), Exercise as tolerated   Is the patient/caregiver able to teach back signs and symptoms of worsening condition:  Fever, Hyperthermia, Edema, Altered mental status(confusion/coma)   Is the patient/caregiver able to teach back the hierarchy of who to call/visit for symptoms/problems? PCP, Specialist, Home health nurse, Urgent Care, ED, 911  Yes   Week 1 call completed?  Yes          Radha Colon RN

## 2019-06-07 ENCOUNTER — TELEPHONE (OUTPATIENT)
Dept: INTERNAL MEDICINE | Facility: CLINIC | Age: 59
End: 2019-06-07

## 2019-06-07 ENCOUNTER — OFFICE VISIT (OUTPATIENT)
Dept: INTERNAL MEDICINE | Facility: CLINIC | Age: 59
End: 2019-06-07

## 2019-06-07 VITALS
WEIGHT: 127.13 LBS | RESPIRATION RATE: 18 BRPM | BODY MASS INDEX: 19.27 KG/M2 | HEIGHT: 68 IN | OXYGEN SATURATION: 99 % | TEMPERATURE: 98.2 F | DIASTOLIC BLOOD PRESSURE: 80 MMHG | HEART RATE: 84 BPM | SYSTOLIC BLOOD PRESSURE: 138 MMHG

## 2019-06-07 DIAGNOSIS — E78.5 HYPERLIPIDEMIA, UNSPECIFIED HYPERLIPIDEMIA TYPE: ICD-10-CM

## 2019-06-07 DIAGNOSIS — Z86.19 HISTORY OF SEPSIS: ICD-10-CM

## 2019-06-07 DIAGNOSIS — E11.42 DIABETIC PERIPHERAL NEUROPATHY (HCC): ICD-10-CM

## 2019-06-07 DIAGNOSIS — I10 ESSENTIAL HYPERTENSION: ICD-10-CM

## 2019-06-07 DIAGNOSIS — E13.9 DIABETES, TYPE 1.5, CONTROLLED, MANAGED AS TYPE 2 (HCC): Primary | ICD-10-CM

## 2019-06-07 DIAGNOSIS — K21.9 GASTROESOPHAGEAL REFLUX DISEASE, ESOPHAGITIS PRESENCE NOT SPECIFIED: ICD-10-CM

## 2019-06-07 PROCEDURE — 99204 OFFICE O/P NEW MOD 45 MIN: CPT | Performed by: NURSE PRACTITIONER

## 2019-06-07 RX ORDER — LISINOPRIL 10 MG/1
10 TABLET ORAL DAILY
Qty: 30 TABLET | Refills: 5 | Status: SHIPPED | OUTPATIENT
Start: 2019-06-07 | End: 2019-06-17 | Stop reason: DRUGHIGH

## 2019-06-07 RX ORDER — PRAVASTATIN SODIUM 20 MG
20 TABLET ORAL DAILY
Qty: 30 TABLET | Refills: 5 | Status: SHIPPED | OUTPATIENT
Start: 2019-06-07 | End: 2019-11-03 | Stop reason: HOSPADM

## 2019-06-07 RX ORDER — BLOOD-GLUCOSE METER
1 KIT MISCELLANEOUS 3 TIMES DAILY
Qty: 1 EACH | Refills: 0 | Status: SHIPPED | OUTPATIENT
Start: 2019-06-07 | End: 2021-07-15

## 2019-06-07 RX ORDER — GABAPENTIN 300 MG/1
300 CAPSULE ORAL 2 TIMES DAILY
Qty: 60 CAPSULE | Refills: 1 | Status: SHIPPED | OUTPATIENT
Start: 2019-06-07 | End: 2019-08-22 | Stop reason: SDUPTHER

## 2019-06-07 NOTE — PROGRESS NOTES
Subjective   Thelma Michael is a 58 y.o. female here today to establish care and for hospital follow up. She was admitted on 5/28 for DKA and sepsis related to UTI. She had 4 days of nausea and vomiting with abdominal pain prior to admit. She was unable to hold down Glipizide and was not taking her insulin due to not eating and feel so poorly. She was discharged on 5/31. She is feeling much better and getting her strength back. She was diagnosed with type 1 diabetes at age of 33 years. Since that time she has seen multiple providers that questioned this and she was finally diagnosed with Type 1.5 diabetes. She has some diabetic neuropathy and takes Gabapentin for this that greatly helps her symptoms. She has been taking Glipizide 5mg bid with meals and Lantus 6 units in the morning and 6 units in the evening. This has done very well to control her blood sugars till now. She has history of hypertension that has been well controlled with medications. GERD is well managed with Protonix. She has been eating bland diet low in cholesterol but will attempt to start eating more well balanced meals. She has history of migraines and low back pain. She's not had a migraine headache for months. She is required to have intermittent FMLA paperwork filled out for her health issues in case she misses more than 3 days in a row at work. She denies any urinary symptoms today. She denies any shortness of air or chest pain.       Chief Complaint   Patient presents with   • Establish Care   • Follow-up     [facility] on 05/27/19       Review of Systems   Constitutional: Positive for fatigue. Negative for activity change, appetite change, chills, diaphoresis, fever, unexpected weight gain and unexpected weight loss.   HENT: Negative.  Negative for ear discharge, ear pain, mouth sores, nosebleeds, sinus pressure, sneezing and sore throat.    Eyes: Negative for pain, discharge and itching.   Respiratory: Negative.  Negative for cough,  chest tightness, shortness of breath and wheezing.    Cardiovascular: Negative.  Negative for chest pain, palpitations and leg swelling.   Gastrointestinal: Positive for constipation, GERD and indigestion. Negative for abdominal pain, diarrhea, nausea and vomiting.   Endocrine: Negative.  Negative for heat intolerance, polydipsia and polyphagia.   Genitourinary: Negative.  Negative for dysuria, flank pain, frequency, hematuria and urgency.   Musculoskeletal: Positive for arthralgias. Negative for back pain, gait problem, joint swelling, myalgias, neck pain and neck stiffness.   Skin: Negative.  Negative for color change, pallor and rash.   Allergic/Immunologic: Negative.  Negative for immunocompromised state.   Neurological: Positive for weakness and numbness. Negative for seizures, speech difficulty and headache.   Hematological: Negative for adenopathy.   Psychiatric/Behavioral: Positive for sleep disturbance. Negative for agitation, decreased concentration and depressed mood. The patient is not nervous/anxious.        Past Medical History:   Diagnosis Date   • Acid reflux    • Acute bronchitis    • Cardiac murmur    • Diabetes mellitus (CMS/HCC)    • H/O echocardiogram 08/07/2012    i. LVEF 65%.ii. Mild LVH.iii. Borderline evidence of atrial septal aneurysm.  No PFO.    • History of nuclear stress test 08/22/2014    Negative for ischemia and scars; LVEF 77%.     • Hyperlipidemia    • Hypertension    • Impacted cerumen of both ears    • Migraine    • Sinusitis    • Tobacco abuse     quit 4 days ago.     • Urticaria      Past Surgical History:   Procedure Laterality Date   • DENTAL PROCEDURE     • NO PAST SURGERIES       Family History   Problem Relation Age of Onset   • Anxiety disorder Other    • Arthritis Other    • ADD / ADHD Other    • Heart disease Other         cardiac disorder   • Depression Other    • Diabetes Other    • Hyperlipidemia Other    • Hypertension Other    • Lung cancer Other    • Osteoporosis  Other      Social History     Socioeconomic History   • Marital status:      Spouse name: Not on file   • Number of children: Not on file   • Years of education: Not on file   • Highest education level: Not on file   Tobacco Use   • Smoking status: Former Smoker     Last attempt to quit: 2019     Years since quittin.0   • Smokeless tobacco: Never Used   • Tobacco comment: BC PL never smoker    Substance and Sexual Activity   • Alcohol use: Yes     Alcohol/week: 0.6 oz     Types: 1 Glasses of wine per week     Comment: ocassional   • Drug use: No   • Sexual activity: Defer     Comment: Single      No Known Allergies      Current Outpatient Medications on File Prior to Visit   Medication Sig Dispense Refill   • amLODIPine (NORVASC) 10 MG tablet Take 1 tablet by mouth Daily. 30 tablet 0   • Ascorbic Acid (VITAMIN C PO) Vitamin C TABS     • aspirin 325 MG tablet Take 1 tablet by mouth daily.     • BD PEN NEEDLE TOOTIE U/F 32G X 4 MM misc USE ONE NEEDLE WITH INSULIN 4 TIMES DAILY 100 each 1   • CALCIUM PO Take  by mouth.     • Cyanocobalamin (VITAMIN B12 PO) Vitamin B12 TABS     • glipiZIDE (GLUCOTROL) 5 MG ER tablet Take 1 tablet by mouth Daily. as directed; For: Diabetic peripheral neuropathy 30 tablet 11   • glucagon (GLUCAGON EMERGENCY) 1 MG injection Use as directed; For: Diabetics mellitus     • hydrochlorothiazide (MICROZIDE) 12.5 MG capsule Take 1 capsule by mouth Daily. 30 capsule 6   • Lancets (FREESTYLE) lancets USE ONE TO CHECK BLOOD GLUCOSE THREE TIMES DAILY 100 each 0   • lidocaine-prilocaine (EMLA) 2.5-2.5 % cream      • pantoprazole (PROTONIX) 40 MG EC tablet Take 1 tablet by mouth Daily. 30 tablet 0   • [DISCONTINUED] Insulin Glargine (LANTUS SOLOSTAR) 100 UNIT/ML injection pen Inject 10-12 Units under the skin every night. at bedtime Titrate up as necessary per sliding scale; For: Diabetic peripheral neuropathy       No current facility-administered medications on file prior to visit.   "      Objective   Vitals:    06/07/19 0904   BP: 138/80   BP Location: Left arm   Patient Position: Sitting   Cuff Size: Adult   Pulse: 84   Resp: 18   Temp: 98.2 °F (36.8 °C)   TempSrc: Temporal   SpO2: 99%   Weight: 57.7 kg (127 lb 2 oz)   Height: 172.7 cm (68\")   PainSc: 0-No pain     Body mass index is 19.33 kg/m².    Physical Exam   Constitutional: She is oriented to person, place, and time. She appears well-developed and well-nourished.   HENT:   Head: Normocephalic and atraumatic.   Eyes: EOM are normal. Pupils are equal, round, and reactive to light.   Neck: Normal range of motion.   Cardiovascular: Normal rate, regular rhythm and normal heart sounds.   Pulmonary/Chest: Effort normal and breath sounds normal.   Abdominal: Soft. Bowel sounds are normal.   Musculoskeletal: Normal range of motion.   Neurological: She is alert and oriented to person, place, and time. She has normal strength. GCS eye subscore is 4. GCS verbal subscore is 5. GCS motor subscore is 6.   Skin: Skin is warm and dry.   Psychiatric: Her behavior is normal. Thought content normal. Her mood appears anxious. Cognition and memory are normal.   Vitals reviewed.      Assessment/Plan   Problem List Items Addressed This Visit        Cardiovascular and Mediastinum    Hyperlipidemia    Relevant Medications    pravastatin (PRAVACHOL) 20 MG tablet    Hypertension    Relevant Medications    hydrochlorothiazide (MICROZIDE) 12.5 MG capsule    amLODIPine (NORVASC) 10 MG tablet    lisinopril (PRINIVIL,ZESTRIL) 10 MG tablet       Digestive    Gastroesophageal reflux disease    Relevant Medications    pantoprazole (PROTONIX) 40 MG EC tablet       Endocrine    Diabetic peripheral neuropathy (CMS/HCC)    Relevant Medications    glucagon (GLUCAGON EMERGENCY) 1 MG injection    glipiZIDE (GLUCOTROL) 5 MG ER tablet    gabapentin (NEURONTIN) 300 MG capsule    Insulin Glargine (LANTUS SOLOSTAR) 100 UNIT/ML injection pen    Diabetes, type 1.5, controlled, managed " as type 2 (CMS/[facility]) - Primary    Relevant Medications    glucagon (GLUCAGON EMERGENCY) 1 MG injection    glipiZIDE (GLUCOTROL) 5 MG ER tablet    Blood Glucose Monitoring Suppl (FREESTYLE FREEDOM LITE) w/Device kit    glucose blood (FREESTYLE LITE) test strip    Insulin Glargine (LANTUS SOLOSTAR) 100 UNIT/ML injection pen       Other    History of sepsis        Current Outpatient Medications:   •  amLODIPine (NORVASC) 10 MG tablet, Take 1 tablet by mouth Daily., Disp: 30 tablet, Rfl: 0  •  Ascorbic Acid (VITAMIN C PO), Vitamin C TABS, Disp: , Rfl:   •  aspirin 325 MG tablet, Take 1 tablet by mouth daily., Disp: , Rfl:   •  BD PEN NEEDLE TOOTIE U/F 32G X 4 MM misc, USE ONE NEEDLE WITH INSULIN 4 TIMES DAILY, Disp: 100 each, Rfl: 1  •  Blood Glucose Monitoring Suppl (FREESTYLE FREEDOM LITE) w/Device kit, 1 kit 3 (Three) Times a Day. TEST; For: Diabetic hypoglycemia, Diabetic peripheral neuropathy, Disp: 1 each, Rfl: 0  •  CALCIUM PO, Take  by mouth., Disp: , Rfl:   •  carbamide peroxide (DEBROX) 6.5 % otic solution, Administer 5 drops into both ears 2 (Two) Times a Day. Use as directed; For: Impacted cerumen of both ears, Disp: 14.8 mL, Rfl: 5  •  Cyanocobalamin (VITAMIN B12 PO), Vitamin B12 TABS, Disp: , Rfl:   •  glipiZIDE (GLUCOTROL) 5 MG ER tablet, Take 1 tablet by mouth Daily. as directed; For: Diabetic peripheral neuropathy, Disp: 30 tablet, Rfl: 11  •  glucagon (GLUCAGON EMERGENCY) 1 MG injection, Use as directed; For: Diabetics mellitus, Disp: , Rfl:   •  glucose blood (FREESTYLE LITE) test strip, 1 each by Other route 3 (Three) Times a Day for 30 days. Use as instructed, Disp: 100 each, Rfl: 5  •  hydrochlorothiazide (MICROZIDE) 12.5 MG capsule, Take 1 capsule by mouth Daily., Disp: 30 capsule, Rfl: 6  •  Insulin Glargine (LANTUS SOLOSTAR) 100 UNIT/ML injection pen, Inject 6 Units under the skin once in the morning and once at bedtime, Disp: , Rfl:   •  Lancets (FREESTYLE) lancets, USE ONE TO CHECK BLOOD GLUCOSE  THREE TIMES DAILY, Disp: 100 each, Rfl: 0  •  lidocaine-prilocaine (EMLA) 2.5-2.5 % cream, , Disp: , Rfl:   •  lisinopril (PRINIVIL,ZESTRIL) 10 MG tablet, Take 1 tablet by mouth Daily., Disp: 30 tablet, Rfl: 5  •  pantoprazole (PROTONIX) 40 MG EC tablet, Take 1 tablet by mouth Daily., Disp: 30 tablet, Rfl: 0  •  pravastatin (PRAVACHOL) 20 MG tablet, Take 1 tablet by mouth Daily., Disp: 30 tablet, Rfl: 5  •  gabapentin (NEURONTIN) 300 MG capsule, Take 1 capsule by mouth 2 (Two) Times a Day., Disp: 60 capsule, Rfl: 1    Counseling was given to patient for the following topics: appropriate exercise, healthy eating habits, disease prevention, risk factors for cancer, importance of self breast exam and breast health, importance of immunizations, including risks and benefits and bone health.           Plan of care reviewed with the patient at the conclusion of today's visit.  Education was provided regarding diagnosis, management, and any prescribed or recommended OTC medications.  Patient verbalized understanding of and agreement with management plan.     Return in about 2 weeks (around 6/21/2019), or if symptoms worsen or fail to improve.      HOWIE Mckenna

## 2019-06-10 PROBLEM — Z86.19 HISTORY OF SEPSIS: Status: ACTIVE | Noted: 2019-06-10

## 2019-06-12 ENCOUNTER — READMISSION MANAGEMENT (OUTPATIENT)
Dept: CALL CENTER | Facility: HOSPITAL | Age: 59
End: 2019-06-12

## 2019-06-12 ENCOUNTER — TELEPHONE (OUTPATIENT)
Dept: INTERNAL MEDICINE | Facility: CLINIC | Age: 59
End: 2019-06-12

## 2019-06-12 NOTE — OUTREACH NOTE
Sepsis Week 2 Survey      Responses   Facility patient discharged from?  [facility]   Does the patient have one of the following disease processes/diagnoses(primary or secondary)?  Sepsis   Week 2 attempt successful?  Yes   Call start time  1455   Call end time  1505   Discharge diagnosis   Sepsis    Meds reviewed with patient/caregiver?  Yes   Is the patient having any side effects they believe may be caused by any medication additions or changes?  No   Does the patient have all medications related to this admission filled (includes all antibiotics, inhalers, nebulizers,steroids,etc.)  Yes   Is the patient taking all medications as directed (includes completed medication regime)?  Yes   Does the patient have a primary care provider?   Yes   Does the patient have an appointment with their PCP within 7 days of discharge?  Yes   Has the patient kept scheduled appointments due by today?  N/A   Has home health visited the patient within 72 hours of discharge?  N/A   Psychosocial issues?  No   Comments  c/o severe burning pain with edema in ankles and feet, advised pt to keep legs elevated for comfort and consult PCP, pt agreed with plan   Did the patient receive a copy of their discharge instructions?  Yes   Nursing interventions  Reviewed instructions with patient   What is the patient's perception of their health status since discharge?  Improving   Nursing interventions  Nurse provided patient education   Is the patient/caregiver able to teach back Sepsis?  E - Extreme pain or generalized discomfort (worst ever,especially abdomen), S - Shivering,fever or very cold, P - Pale or discolored skin, S - Sleepy, difficult to arouse,confused, I -   I feel like I might die-a feeling of hopelessness, S - Short of breath   Nursing interventions  Nurse provided reassurance to patient   Is patient/caregiver able to teach back steps to recovery at home?  Set small, achievable goals for return to baseline health, Rest and regain  strength   Is the patient/caregiver able to teach back signs and symptoms of worsening condition:  Fever, Rapid heart rate (>90), Altered mental status(confusion/coma), Hyperthermia, Shortness of breath/rapid respiratory rate, Edema   Is the patient/caregiver able to teach back the hierarchy of who to call/visit for symptoms/problems? PCP, Specialist, Home health nurse, Urgent Care, ED, 911  Yes   Week 2 call completed?  Yes          Lanny Ellington RN

## 2019-06-14 RX ORDER — ALBUTEROL SULFATE 90 UG/1
2 AEROSOL, METERED RESPIRATORY (INHALATION) EVERY 4 HOURS PRN
Qty: 18 G | Refills: 5 | Status: SHIPPED | OUTPATIENT
Start: 2019-06-14 | End: 2019-06-17 | Stop reason: SDUPTHER

## 2019-06-17 ENCOUNTER — OFFICE VISIT (OUTPATIENT)
Dept: INTERNAL MEDICINE | Facility: CLINIC | Age: 59
End: 2019-06-17

## 2019-06-17 VITALS
SYSTOLIC BLOOD PRESSURE: 150 MMHG | RESPIRATION RATE: 18 BRPM | DIASTOLIC BLOOD PRESSURE: 78 MMHG | TEMPERATURE: 97.9 F | HEIGHT: 68 IN | WEIGHT: 135.13 LBS | OXYGEN SATURATION: 100 % | HEART RATE: 89 BPM | BODY MASS INDEX: 20.48 KG/M2

## 2019-06-17 DIAGNOSIS — M79.89 LEG SWELLING: ICD-10-CM

## 2019-06-17 DIAGNOSIS — T88.7XXA MEDICATION SIDE EFFECT: ICD-10-CM

## 2019-06-17 DIAGNOSIS — E13.9 DIABETES, TYPE 1.5, CONTROLLED, MANAGED AS TYPE 2 (HCC): ICD-10-CM

## 2019-06-17 DIAGNOSIS — I10 ESSENTIAL HYPERTENSION: Primary | ICD-10-CM

## 2019-06-17 PROCEDURE — 99214 OFFICE O/P EST MOD 30 MIN: CPT | Performed by: NURSE PRACTITIONER

## 2019-06-17 RX ORDER — VITAMIN B COMPLEX
1 CAPSULE ORAL DAILY
Qty: 30 CAPSULE | Refills: 11 | Status: SHIPPED | OUTPATIENT
Start: 2019-06-17 | End: 2020-04-02 | Stop reason: SDUPTHER

## 2019-06-17 RX ORDER — PANTOPRAZOLE SODIUM 40 MG/1
40 TABLET, DELAYED RELEASE ORAL DAILY
Qty: 30 TABLET | Refills: 5 | Status: SHIPPED | OUTPATIENT
Start: 2019-06-17 | End: 2020-04-02 | Stop reason: SDUPTHER

## 2019-06-17 RX ORDER — ALBUTEROL SULFATE 90 UG/1
2 AEROSOL, METERED RESPIRATORY (INHALATION) EVERY 4 HOURS PRN
Qty: 18 G | Refills: 5 | Status: SHIPPED | OUTPATIENT
Start: 2019-06-17 | End: 2020-03-30 | Stop reason: SDUPTHER

## 2019-06-17 RX ORDER — LISINOPRIL 30 MG/1
30 TABLET ORAL DAILY
Qty: 30 TABLET | Refills: 1 | Status: ON HOLD | OUTPATIENT
Start: 2019-06-17 | End: 2019-11-03 | Stop reason: SDUPTHER

## 2019-06-17 RX ORDER — PHENOL 1.4 %
600 AEROSOL, SPRAY (ML) MUCOUS MEMBRANE DAILY
Qty: 30 TABLET | Refills: 11 | Status: SHIPPED | OUTPATIENT
Start: 2019-06-17 | End: 2020-04-02 | Stop reason: SDUPTHER

## 2019-06-17 NOTE — PROGRESS NOTES
Subjective   Thelma Michael is a 58 y.o. female here today for one week of leg swelling. She was recently hospitalized for sepsis related to UTI and DKA. Her medications were changed and Amlodipine was increased for better blood pressure control. Her legs have been very swollen and do not improve with elevation. She is not taking any NSAIDS. Discussed with her that Neurontin may also be cause for her swelling. She has not picked up her new glucometer due to problems with insurance coverage so she does not know what her sugar has been running. She will be able to pick this up today. She denies any shortness of air or chest pain.     Chief Complaint   Patient presents with   • Foot Swelling     x1 week        Review of Systems   Constitutional: Positive for fatigue. Negative for activity change, appetite change, chills, diaphoresis, fever, unexpected weight gain and unexpected weight loss.   HENT: Negative.  Negative for ear discharge, ear pain, mouth sores, nosebleeds, sinus pressure, sneezing and sore throat.    Eyes: Negative.  Negative for pain, discharge and itching.   Respiratory: Negative.  Negative for cough, chest tightness, shortness of breath and wheezing.    Cardiovascular: Positive for leg swelling. Negative for chest pain and palpitations.   Gastrointestinal: Negative.  Positive for GERD and indigestion. Negative for abdominal pain, diarrhea, nausea and vomiting.   Endocrine: Negative.  Negative for heat intolerance, polydipsia and polyphagia.   Genitourinary: Negative.  Negative for dysuria, flank pain, frequency, hematuria and urgency.   Musculoskeletal: Positive for arthralgias. Negative for back pain, gait problem, joint swelling, myalgias, neck pain and neck stiffness.   Skin: Negative.  Negative for color change, pallor and rash.   Allergic/Immunologic: Negative.  Negative for immunocompromised state.   Neurological: Negative for seizures, speech difficulty and headache.   Hematological:  Negative.  Negative for adenopathy.   Psychiatric/Behavioral: Negative.  Negative for agitation, decreased concentration and depressed mood. The patient is not nervous/anxious.        Past Medical History:   Diagnosis Date   • Acid reflux    • Acute bronchitis    • Cardiac murmur    • Diabetes mellitus (CMS/HCC)    • H/O echocardiogram 2012    i. LVEF 65%.ii. Mild LVH.iii. Borderline evidence of atrial septal aneurysm.  No PFO.    • History of nuclear stress test 2014    Negative for ischemia and scars; LVEF 77%.     • Hyperlipidemia    • Hypertension    • Impacted cerumen of both ears    • Migraine    • Sinusitis    • Tobacco abuse     quit 4 days ago.     • Urticaria      Past Surgical History:   Procedure Laterality Date   • DENTAL PROCEDURE     • NO PAST SURGERIES       Family History   Problem Relation Age of Onset   • Anxiety disorder Other    • Arthritis Other    • ADD / ADHD Other    • Heart disease Other         cardiac disorder   • Depression Other    • Diabetes Other    • Hyperlipidemia Other    • Hypertension Other    • Lung cancer Other    • Osteoporosis Other      Social History     Socioeconomic History   • Marital status:      Spouse name: Not on file   • Number of children: Not on file   • Years of education: Not on file   • Highest education level: Not on file   Tobacco Use   • Smoking status: Former Smoker     Last attempt to quit: 2019     Years since quittin.0   • Smokeless tobacco: Never Used   • Tobacco comment: BC PL never smoker    Substance and Sexual Activity   • Alcohol use: Yes     Alcohol/week: 0.6 oz     Types: 1 Glasses of wine per week     Comment: ocassional   • Drug use: No   • Sexual activity: Defer     Comment: Single      Current Outpatient Medications on File Prior to Visit   Medication Sig   • Ascorbic Acid (VITAMIN C PO) Vitamin C TABS   • aspirin 325 MG tablet Take 1 tablet by mouth daily.   • BD PEN NEEDLE TOOTIE U/F 32G X 4 MM misc USE ONE NEEDLE  "WITH INSULIN 4 TIMES DAILY   • Blood Glucose Monitoring Suppl (FREESTYLE FREEDOM LITE) w/Device kit 1 kit 3 (Three) Times a Day. TEST; For: Diabetic hypoglycemia, Diabetic peripheral neuropathy   • carbamide peroxide (DEBROX) 6.5 % otic solution Administer 5 drops into both ears 2 (Two) Times a Day. Use as directed; For: Impacted cerumen of both ears   • Cyanocobalamin (VITAMIN B12 PO) Vitamin B12 TABS   • gabapentin (NEURONTIN) 300 MG capsule Take 1 capsule by mouth 2 (Two) Times a Day.   • glipiZIDE (GLUCOTROL) 5 MG ER tablet Take 1 tablet by mouth Daily. as directed; For: Diabetic peripheral neuropathy   • glucagon (GLUCAGON EMERGENCY) 1 MG injection Use as directed; For: Diabetics mellitus   • glucose blood (FREESTYLE LITE) test strip 1 each by Other route 3 (Three) Times a Day for 30 days. Use as instructed   • hydrochlorothiazide (MICROZIDE) 12.5 MG capsule Take 1 capsule by mouth Daily.   • Lancets (FREESTYLE) lancets USE ONE TO CHECK BLOOD GLUCOSE THREE TIMES DAILY   • lidocaine-prilocaine (EMLA) 2.5-2.5 % cream    • pravastatin (PRAVACHOL) 20 MG tablet Take 1 tablet by mouth Daily.     No current facility-administered medications on file prior to visit.      No Known Allergies        Objective   Vitals:    06/17/19 1037   BP: 150/78   BP Location: Left arm   Patient Position: Sitting   Cuff Size: Adult   Pulse: 89   Resp: 18   Temp: 97.9 °F (36.6 °C)   TempSrc: Temporal   SpO2: 100%   Weight: 61.3 kg (135 lb 2 oz)   Height: 172.7 cm (68\")   PainSc: 0-No pain     Body mass index is 20.55 kg/m².    Physical Exam   Constitutional: She is oriented to person, place, and time. She appears well-developed and well-nourished.   HENT:   Head: Normocephalic and atraumatic.   Eyes: EOM are normal. Pupils are equal, round, and reactive to light.   Neck: Normal range of motion.   Cardiovascular: Normal rate, regular rhythm and normal heart sounds.   Pulmonary/Chest: Effort normal and breath sounds normal.   Abdominal: " Soft. Bowel sounds are normal.   Musculoskeletal: Normal range of motion.   Generalized swelling of legs with +1 pitting edema above and at ankles.    Neurological: She is alert and oriented to person, place, and time. She has normal strength. GCS eye subscore is 4. GCS verbal subscore is 5. GCS motor subscore is 6.   Skin: Skin is warm and dry.   Psychiatric: Her behavior is normal. Thought content normal. Her mood appears anxious. Cognition and memory are normal.   Vitals reviewed.      Assessment/Plan   Problem List Items Addressed This Visit        Cardiovascular and Mediastinum    Hypertension - Primary    Relevant Medications    hydrochlorothiazide (MICROZIDE) 12.5 MG capsule - continue    lisinopril (PRINIVIL,ZESTRIL) 30 MG tablet - increase       Endocrine    Diabetes, type 1.5, controlled, managed as type 2 (CMS/[facility])    Relevant Medications    Insulin Glargine (LANTUS SOLOSTAR) 100 UNIT/ML injection pen      Other Visit Diagnoses     Leg swelling    - stop Amlodipine, increase water intake    Medication side effect                  Current Outpatient Medications:   •  albuterol sulfate  (90 Base) MCG/ACT inhaler, Inhale 2 puffs Every 4 (Four) Hours As Needed for Wheezing., Disp: 18 g, Rfl: 5  •  Ascorbic Acid (VITAMIN C PO), Vitamin C TABS, Disp: , Rfl:   •  aspirin 325 MG tablet, Take 1 tablet by mouth daily., Disp: , Rfl:   •  BD PEN NEEDLE TOOTIE U/F 32G X 4 MM misc, USE ONE NEEDLE WITH INSULIN 4 TIMES DAILY, Disp: 100 each, Rfl: 1  •  Blood Glucose Monitoring Suppl (FREESTYLE FREEDOM LITE) w/Device kit, 1 kit 3 (Three) Times a Day. TEST; For: Diabetic hypoglycemia, Diabetic peripheral neuropathy, Disp: 1 each, Rfl: 0  •  carbamide peroxide (DEBROX) 6.5 % otic solution, Administer 5 drops into both ears 2 (Two) Times a Day. Use as directed; For: Impacted cerumen of both ears, Disp: 14.8 mL, Rfl: 5  •  Cyanocobalamin (VITAMIN B12 PO), Vitamin B12 TABS, Disp: , Rfl:   •  gabapentin (NEURONTIN) 300 MG  capsule, Take 1 capsule by mouth 2 (Two) Times a Day., Disp: 60 capsule, Rfl: 1  •  glipiZIDE (GLUCOTROL) 5 MG ER tablet, Take 1 tablet by mouth Daily. as directed; For: Diabetic peripheral neuropathy, Disp: 30 tablet, Rfl: 11  •  glucagon (GLUCAGON EMERGENCY) 1 MG injection, Use as directed; For: Diabetics mellitus, Disp: , Rfl:   •  glucose blood (FREESTYLE LITE) test strip, 1 each by Other route 3 (Three) Times a Day for 30 days. Use as instructed, Disp: 100 each, Rfl: 5  •  hydrochlorothiazide (MICROZIDE) 12.5 MG capsule, Take 1 capsule by mouth Daily., Disp: 30 capsule, Rfl: 6  •  Insulin Glargine (LANTUS SOLOSTAR) 100 UNIT/ML injection pen, Inject 12 Units under the skin into the appropriate area as directed Every Night., Disp: 3 mL, Rfl: 5  •  Lancets (FREESTYLE) lancets, USE ONE TO CHECK BLOOD GLUCOSE THREE TIMES DAILY, Disp: 100 each, Rfl: 0  •  lidocaine-prilocaine (EMLA) 2.5-2.5 % cream, , Disp: , Rfl:   •  pantoprazole (PROTONIX) 40 MG EC tablet, Take 1 tablet by mouth Daily., Disp: 30 tablet, Rfl: 5  •  pravastatin (PRAVACHOL) 20 MG tablet, Take 1 tablet by mouth Daily., Disp: 30 tablet, Rfl: 5  •  b complex vitamins capsule, Take 1 capsule by mouth Daily., Disp: 30 capsule, Rfl: 11  •  calcium carbonate (CALCIUM 600) 600 MG tablet, Take 1 tablet by mouth Daily., Disp: 30 tablet, Rfl: 11  •  lisinopril (PRINIVIL,ZESTRIL) 30 MG tablet, Take 1 tablet by mouth Daily., Disp: 30 tablet, Rfl: 1    Counseling was given to patient for the following topics: appropriate exercise and healthy eating habits.       Plan of care reviewed with the patient at the conclusion of today's visit.  Education was provided regarding diagnosis, management, and any prescribed or recommended OTC medications.  Patient verbalized understanding of and agreement with management plan.     Return if symptoms worsen or fail to improve, for Next scheduled follow up.      Monet Chau, HOWIE

## 2019-06-18 NOTE — PROGRESS NOTES
I have reviewed the notes, assessments, and/or procedures performed by Mrs Clark, I concur with her/his documentation of Thelma Michael.

## 2019-06-19 RX ORDER — GLUCOSAMINE HCL/CHONDROITIN SU 500-400 MG
1 CAPSULE ORAL 3 TIMES DAILY
Qty: 100 EACH | Refills: 11 | Status: SHIPPED | OUTPATIENT
Start: 2019-06-19 | End: 2020-03-30

## 2019-06-19 RX ORDER — BLOOD-GLUCOSE METER
1 KIT MISCELLANEOUS 3 TIMES DAILY
Qty: 1 EACH | Refills: 0 | Status: SHIPPED | OUTPATIENT
Start: 2019-06-19 | End: 2020-05-06

## 2019-06-20 ENCOUNTER — READMISSION MANAGEMENT (OUTPATIENT)
Dept: CALL CENTER | Facility: HOSPITAL | Age: 59
End: 2019-06-20

## 2019-06-20 NOTE — OUTREACH NOTE
Sepsis Week 3 Survey      Responses   Facility patient discharged from?  [facility]   Does the patient have one of the following disease processes/diagnoses(primary or secondary)?  Sepsis   Week 3 attempt successful?  No   Unsuccessful attempts  Attempt 1          Yunier Mane RN

## 2019-06-21 ENCOUNTER — OFFICE VISIT (OUTPATIENT)
Dept: INTERNAL MEDICINE | Facility: CLINIC | Age: 59
End: 2019-06-21

## 2019-06-21 ENCOUNTER — TELEPHONE (OUTPATIENT)
Dept: INTERNAL MEDICINE | Facility: CLINIC | Age: 59
End: 2019-06-21

## 2019-06-21 VITALS
BODY MASS INDEX: 20.82 KG/M2 | HEIGHT: 68 IN | TEMPERATURE: 96.9 F | OXYGEN SATURATION: 100 % | RESPIRATION RATE: 18 BRPM | SYSTOLIC BLOOD PRESSURE: 130 MMHG | WEIGHT: 137.38 LBS | HEART RATE: 78 BPM | DIASTOLIC BLOOD PRESSURE: 78 MMHG

## 2019-06-21 DIAGNOSIS — I10 ESSENTIAL HYPERTENSION: Primary | ICD-10-CM

## 2019-06-21 DIAGNOSIS — G89.29 CHRONIC MIDLINE LOW BACK PAIN WITHOUT SCIATICA: ICD-10-CM

## 2019-06-21 DIAGNOSIS — E11.42 DIABETIC PERIPHERAL NEUROPATHY (HCC): ICD-10-CM

## 2019-06-21 DIAGNOSIS — E13.9 DIABETES, TYPE 1.5, CONTROLLED, MANAGED AS TYPE 2 (HCC): ICD-10-CM

## 2019-06-21 DIAGNOSIS — M54.50 CHRONIC MIDLINE LOW BACK PAIN WITHOUT SCIATICA: ICD-10-CM

## 2019-06-21 PROCEDURE — 99214 OFFICE O/P EST MOD 30 MIN: CPT | Performed by: NURSE PRACTITIONER

## 2019-06-21 RX ORDER — TRAMADOL HYDROCHLORIDE 50 MG/1
50 TABLET ORAL EVERY 6 HOURS PRN
Qty: 45 TABLET | Refills: 0 | Status: SHIPPED | OUTPATIENT
Start: 2019-06-21 | End: 2019-06-21

## 2019-06-21 RX ORDER — TRAMADOL HYDROCHLORIDE 50 MG/1
50 TABLET ORAL EVERY 6 HOURS PRN
Qty: 28 TABLET | Refills: 0 | Status: SHIPPED | OUTPATIENT
Start: 2019-06-21 | End: 2019-06-28

## 2019-06-21 NOTE — PROGRESS NOTES
Subjective   Thelma Michael is a 58 y.o. female here today for follow up. She is doing much better. Leg swelling has resolved after discontinuing amlodipine.  Blood pressure and heart rate have remained stable with increase in lisinopril.  Blood sugars have been stable. She had back injury about 20 years ago when she fell on some ice and now has one spurring along the lumbar spine. She hasn't had an xray in some time. Pain has been well managed with neurontin and Ultram from previous provider. She also takes Neurontin for polyneuropathy. She would like to return to work and needs letter completed. She denies any shortness of air or chest pain.     Chief Complaint   Patient presents with   • Hypertension     follow up        Review of Systems   Constitutional: Positive for fatigue. Negative for activity change, appetite change, chills, diaphoresis, fever, unexpected weight gain and unexpected weight loss.   HENT: Negative.  Negative for ear discharge, ear pain, mouth sores, nosebleeds, sinus pressure, sneezing and sore throat.    Eyes: Negative.  Negative for pain, discharge and itching.   Respiratory: Negative.  Negative for cough, chest tightness, shortness of breath and wheezing.    Cardiovascular: Negative.  Negative for chest pain and palpitations.   Gastrointestinal: Negative.  Negative for abdominal pain, diarrhea, nausea, vomiting, GERD and indigestion.   Endocrine: Negative.  Negative for heat intolerance, polydipsia and polyphagia.   Genitourinary: Negative.  Negative for dysuria, flank pain, frequency, hematuria and urgency.   Musculoskeletal: Positive for arthralgias and back pain. Negative for gait problem, joint swelling, myalgias, neck pain and neck stiffness.   Skin: Negative.  Negative for color change, pallor and rash.   Allergic/Immunologic: Negative.  Negative for immunocompromised state.   Neurological: Negative.  Negative for seizures, speech difficulty and headache.   Hematological:  Negative.  Negative for adenopathy.   Psychiatric/Behavioral: Negative.  Negative for agitation, decreased concentration and depressed mood. The patient is not nervous/anxious.        Past Medical History:   Diagnosis Date   • Acid reflux    • Acute bronchitis    • Cardiac murmur    • Diabetes mellitus (CMS/HCC)    • H/O echocardiogram 2012    i. LVEF 65%.ii. Mild LVH.iii. Borderline evidence of atrial septal aneurysm.  No PFO.    • History of nuclear stress test 2014    Negative for ischemia and scars; LVEF 77%.     • Hyperlipidemia    • Hypertension    • Impacted cerumen of both ears    • Migraine    • Sinusitis    • Tobacco abuse     quit 4 days ago.     • Urticaria      Past Surgical History:   Procedure Laterality Date   • DENTAL PROCEDURE     • NO PAST SURGERIES       Family History   Problem Relation Age of Onset   • Anxiety disorder Other    • Arthritis Other    • ADD / ADHD Other    • Heart disease Other         cardiac disorder   • Depression Other    • Diabetes Other    • Hyperlipidemia Other    • Hypertension Other    • Lung cancer Other    • Osteoporosis Other      Social History     Socioeconomic History   • Marital status:      Spouse name: Not on file   • Number of children: Not on file   • Years of education: Not on file   • Highest education level: Not on file   Tobacco Use   • Smoking status: Former Smoker     Last attempt to quit: 2019     Years since quittin.0   • Smokeless tobacco: Never Used   • Tobacco comment: BC PL never smoker    Substance and Sexual Activity   • Alcohol use: Yes     Alcohol/week: 0.6 oz     Types: 1 Glasses of wine per week     Comment: ocassional   • Drug use: No   • Sexual activity: Defer     Comment: Single      Current Outpatient Medications on File Prior to Visit   Medication Sig   • albuterol sulfate  (90 Base) MCG/ACT inhaler Inhale 2 puffs Every 4 (Four) Hours As Needed for Wheezing.   • Ascorbic Acid (VITAMIN C PO) Vitamin C  TABS   • aspirin 325 MG tablet Take 1 tablet by mouth daily.   • b complex vitamins capsule Take 1 capsule by mouth Daily.   • BD PEN NEEDLE TOOTIE U/F 32G X 4 MM misc USE ONE NEEDLE WITH INSULIN 4 TIMES DAILY   • Blood Glucose Monitoring Suppl (FREESTYLE FREEDOM LITE) w/Device kit 1 kit 3 (Three) Times a Day. TEST; For: Diabetic hypoglycemia, Diabetic peripheral neuropathy   • calcium carbonate (CALCIUM 600) 600 MG tablet Take 1 tablet by mouth Daily.   • carbamide peroxide (DEBROX) 6.5 % otic solution Administer 5 drops into both ears 2 (Two) Times a Day. Use as directed; For: Impacted cerumen of both ears   • Cyanocobalamin (VITAMIN B12 PO) Vitamin B12 TABS   • gabapentin (NEURONTIN) 300 MG capsule Take 1 capsule by mouth 2 (Two) Times a Day.   • glipiZIDE (GLUCOTROL) 5 MG ER tablet Take 1 tablet by mouth Daily. as directed; For: Diabetic peripheral neuropathy   • glucagon (GLUCAGON EMERGENCY) 1 MG injection Use as directed; For: Diabetics mellitus   • Glucose Blood (BLOOD GLUCOSE TEST) strip 1 each by In Vitro route 3 (Three) Times a Day. One touch strips   • glucose monitor monitoring kit 1 each 3 (Three) Times a Day. One touch glucometer   • hydrochlorothiazide (MICROZIDE) 12.5 MG capsule Take 1 capsule by mouth Daily.   • Insulin Glargine (LANTUS SOLOSTAR) 100 UNIT/ML injection pen Inject 12 Units under the skin into the appropriate area as directed Every Night.   • lidocaine-prilocaine (EMLA) 2.5-2.5 % cream    • lisinopril (PRINIVIL,ZESTRIL) 30 MG tablet Take 1 tablet by mouth Daily.   • pantoprazole (PROTONIX) 40 MG EC tablet Take 1 tablet by mouth Daily.   • pravastatin (PRAVACHOL) 20 MG tablet Take 1 tablet by mouth Daily.   • [DISCONTINUED] Lancets (FREESTYLE) lancets USE ONE TO CHECK BLOOD GLUCOSE THREE TIMES DAILY     No current facility-administered medications on file prior to visit.      No Known Allergies        Objective   Vitals:    06/21/19 0830   BP: 130/78   BP Location: Left arm   Patient  "Position: Sitting   Cuff Size: Adult   Pulse: 78   Resp: 18   Temp: 96.9 °F (36.1 °C)   TempSrc: Temporal   SpO2: 100%   Weight: 62.3 kg (137 lb 6 oz)   Height: 172.7 cm (68\")   PainSc: 0-No pain     Body mass index is 20.89 kg/m².    Physical Exam   Constitutional: She is oriented to person, place, and time. She appears well-developed and well-nourished.   HENT:   Head: Normocephalic and atraumatic.   Eyes: EOM are normal. Pupils are equal, round, and reactive to light.   Neck: Normal range of motion.   Cardiovascular: Normal rate, regular rhythm and normal heart sounds.   Pulmonary/Chest: Effort normal and breath sounds normal.   Abdominal: Soft. Bowel sounds are normal.   Musculoskeletal: Normal range of motion.   Generalized mild swelling of ankles bilaterally. Greatly improved since last visit.    Neurological: She is alert and oriented to person, place, and time. She has normal strength. GCS eye subscore is 4. GCS verbal subscore is 5. GCS motor subscore is 6.   Skin: Skin is warm and dry.   Psychiatric: Her behavior is normal. Thought content normal. Her mood appears anxious. Cognition and memory are normal.   Vitals reviewed.      Assessment/Plan   Problem List Items Addressed This Visit        Cardiovascular and Mediastinum    Hypertension    Relevant Medications    hydrochlorothiazide (MICROZIDE) 12.5 MG capsule    lisinopril (PRINIVIL,ZESTRIL) 30 MG tablet       Endocrine    Diabetic peripheral neuropathy (CMS/HCC)    Relevant Medications    gabapentin (NEURONTIN) 300 MG capsule      Diabetes, type 1.5, controlled, managed as type 2 (CMS/HCC)    Relevant Medications    glucagon (GLUCAGON EMERGENCY) 1 MG injection    glipiZIDE (GLUCOTROL) 5 MG ER tablet    Blood Glucose Monitoring Suppl (FREESTYLE FREEDOM LITE) w/Device kit    Insulin Glargine (LANTUS SOLOSTAR) 100 UNIT/ML injection pen       Nervous and Auditory    Back pain - Primary    Relevant Medications    traMADol (ULTRAM) 50 MG tablet  gabapentin " (NEURONTIN) 300 MG capsule    Other Relevant Orders    XR Spine Lumbar 2 or 3 View            Current Outpatient Medications:   •  albuterol sulfate  (90 Base) MCG/ACT inhaler, Inhale 2 puffs Every 4 (Four) Hours As Needed for Wheezing., Disp: 18 g, Rfl: 5  •  Ascorbic Acid (VITAMIN C PO), Vitamin C TABS, Disp: , Rfl:   •  aspirin 325 MG tablet, Take 1 tablet by mouth daily., Disp: , Rfl:   •  b complex vitamins capsule, Take 1 capsule by mouth Daily., Disp: 30 capsule, Rfl: 11  •  BD PEN NEEDLE TOOTIE U/F 32G X 4 MM misc, USE ONE NEEDLE WITH INSULIN 4 TIMES DAILY, Disp: 100 each, Rfl: 1  •  Blood Glucose Monitoring Suppl (FREESTYLE FREEDOM LITE) w/Device kit, 1 kit 3 (Three) Times a Day. TEST; For: Diabetic hypoglycemia, Diabetic peripheral neuropathy, Disp: 1 each, Rfl: 0  •  calcium carbonate (CALCIUM 600) 600 MG tablet, Take 1 tablet by mouth Daily., Disp: 30 tablet, Rfl: 11  •  carbamide peroxide (DEBROX) 6.5 % otic solution, Administer 5 drops into both ears 2 (Two) Times a Day. Use as directed; For: Impacted cerumen of both ears, Disp: 14.8 mL, Rfl: 5  •  Cyanocobalamin (VITAMIN B12 PO), Vitamin B12 TABS, Disp: , Rfl:   •  gabapentin (NEURONTIN) 300 MG capsule, Take 1 capsule by mouth 2 (Two) Times a Day., Disp: 60 capsule, Rfl: 1  •  glipiZIDE (GLUCOTROL) 5 MG ER tablet, Take 1 tablet by mouth Daily. as directed; For: Diabetic peripheral neuropathy, Disp: 30 tablet, Rfl: 11  •  glucagon (GLUCAGON EMERGENCY) 1 MG injection, Use as directed; For: Diabetics mellitus, Disp: , Rfl:   •  Glucose Blood (BLOOD GLUCOSE TEST) strip, 1 each by In Vitro route 3 (Three) Times a Day. One touch strips, Disp: 100 each, Rfl: 11  •  glucose monitor monitoring kit, 1 each 3 (Three) Times a Day. One touch glucometer, Disp: 1 each, Rfl: 0  •  hydrochlorothiazide (MICROZIDE) 12.5 MG capsule, Take 1 capsule by mouth Daily., Disp: 30 capsule, Rfl: 6  •  Insulin Glargine (LANTUS SOLOSTAR) 100 UNIT/ML injection pen, Inject 12  Units under the skin into the appropriate area as directed Every Night., Disp: 3 mL, Rfl: 5  •  lidocaine-prilocaine (EMLA) 2.5-2.5 % cream, , Disp: , Rfl:   •  lisinopril (PRINIVIL,ZESTRIL) 30 MG tablet, Take 1 tablet by mouth Daily., Disp: 30 tablet, Rfl: 1  •  pantoprazole (PROTONIX) 40 MG EC tablet, Take 1 tablet by mouth Daily., Disp: 30 tablet, Rfl: 5  •  pravastatin (PRAVACHOL) 20 MG tablet, Take 1 tablet by mouth Daily., Disp: 30 tablet, Rfl: 5  •  ONE TOUCH LANCETS misc, 1 each 3 (Three) Times a Day., Disp: 200 each, Rfl: 2  •  traMADol (ULTRAM) 50 MG tablet, Take 1 tablet by mouth Every 6 (Six) Hours As Needed for Moderate Pain  for up to 7 days., Disp: 28 tablet, Rfl: 0         Plan of care reviewed with the patient at the conclusion of today's visit.  Education was provided regarding diagnosis, management, and any prescribed or recommended OTC medications.  Patient verbalized understanding of and agreement with management plan.     Return if symptoms worsen or fail to improve.      Monet Chau, APRN

## 2019-06-28 ENCOUNTER — TELEPHONE (OUTPATIENT)
Dept: INTERNAL MEDICINE | Facility: CLINIC | Age: 59
End: 2019-06-28

## 2019-06-28 NOTE — TELEPHONE ENCOUNTER
Spoke with patient about the Glucometer being sent to another [facility] pharmacy. Contacted the [facility] pharmacy and spoke to them myself and they are getting the prescription filled and stated to let patient know that within 30 mins to call and order should be ready.

## 2019-07-01 NOTE — TELEPHONE ENCOUNTER
Spoke with patient about records being faxed. Papers was faxed on 06/28/19 and received confirmation. Patient stated that she contacted them and that they actually received them as well. Patient thanked office and ended call.

## 2019-07-09 ENCOUNTER — TELEPHONE (OUTPATIENT)
Dept: INTERNAL MEDICINE | Facility: CLINIC | Age: 59
End: 2019-07-09

## 2019-07-09 NOTE — TELEPHONE ENCOUNTER
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 PLEASE GIVE PATIENT A CALL AND LET HER KNOW IF SHE NEEDS TO BE SEEN TO HAVE THIS LETTER WRITTEN. PATIENT SAID SHE WILL BE AT WORK UNTIL 5 PM AND AFTER THAT YOU CAN REACH HER AT HER HOME NUMBER.

## 2019-08-22 ENCOUNTER — OFFICE VISIT (OUTPATIENT)
Dept: INTERNAL MEDICINE | Facility: CLINIC | Age: 59
End: 2019-08-22

## 2019-08-22 VITALS
HEART RATE: 85 BPM | SYSTOLIC BLOOD PRESSURE: 140 MMHG | BODY MASS INDEX: 19.88 KG/M2 | DIASTOLIC BLOOD PRESSURE: 88 MMHG | OXYGEN SATURATION: 99 % | HEIGHT: 68 IN | WEIGHT: 131.2 LBS | TEMPERATURE: 97 F | RESPIRATION RATE: 16 BRPM

## 2019-08-22 DIAGNOSIS — E11.42 DIABETIC PERIPHERAL NEUROPATHY (HCC): ICD-10-CM

## 2019-08-22 DIAGNOSIS — R73.9 HYPERGLYCEMIA: ICD-10-CM

## 2019-08-22 DIAGNOSIS — Z13.29 THYROID DISORDER SCREENING: ICD-10-CM

## 2019-08-22 DIAGNOSIS — R19.7 DIARRHEA, UNSPECIFIED TYPE: ICD-10-CM

## 2019-08-22 DIAGNOSIS — R63.0 DECREASED APPETITE: ICD-10-CM

## 2019-08-22 DIAGNOSIS — R41.0 CONFUSION: ICD-10-CM

## 2019-08-22 DIAGNOSIS — E13.9 DIABETES 1.5, MANAGED AS TYPE 2 (HCC): Primary | ICD-10-CM

## 2019-08-22 DIAGNOSIS — R41.3 MEMORY LOSS: ICD-10-CM

## 2019-08-22 DIAGNOSIS — R20.0 FACIAL NUMBNESS: ICD-10-CM

## 2019-08-22 LAB
ALBUMIN SERPL-MCNC: 4.6 G/DL (ref 3.5–5.2)
ALBUMIN/GLOB SERPL: 1.6 G/DL
ALP SERPL-CCNC: 101 U/L (ref 39–117)
ALT SERPL W P-5'-P-CCNC: 12 U/L (ref 1–33)
ANION GAP SERPL CALCULATED.3IONS-SCNC: 12.8 MMOL/L (ref 5–15)
AST SERPL-CCNC: 16 U/L (ref 1–32)
BASOPHILS # BLD AUTO: 0.03 10*3/MM3 (ref 0–0.2)
BASOPHILS NFR BLD AUTO: 0.6 % (ref 0–1.5)
BILIRUB SERPL-MCNC: 0.5 MG/DL (ref 0.2–1.2)
BUN BLD-MCNC: 17 MG/DL (ref 8–23)
BUN/CREAT SERPL: 18.3 (ref 7–25)
CALCIUM SPEC-SCNC: 9.5 MG/DL (ref 8.6–10.5)
CHLORIDE SERPL-SCNC: 96 MMOL/L (ref 98–107)
CO2 SERPL-SCNC: 27.2 MMOL/L (ref 22–29)
CREAT BLD-MCNC: 0.93 MG/DL (ref 0.57–1)
DEPRECATED RDW RBC AUTO: 43.4 FL (ref 37–54)
EOSINOPHIL # BLD AUTO: 0.05 10*3/MM3 (ref 0–0.4)
EOSINOPHIL NFR BLD AUTO: 1 % (ref 0.3–6.2)
ERYTHROCYTE [DISTWIDTH] IN BLOOD BY AUTOMATED COUNT: 12.8 % (ref 12.3–15.4)
GFR SERPL CREATININE-BSD FRML MDRD: 75 ML/MIN/1.73
GLOBULIN UR ELPH-MCNC: 2.9 GM/DL
GLUCOSE BLD-MCNC: 385 MG/DL (ref 65–99)
HBA1C MFR BLD: 9.5 % (ref 4.8–5.6)
HCT VFR BLD AUTO: 45 % (ref 34–46.6)
HGB BLD-MCNC: 15 G/DL (ref 12–15.9)
IMM GRANULOCYTES # BLD AUTO: 0.01 10*3/MM3 (ref 0–0.05)
IMM GRANULOCYTES NFR BLD AUTO: 0.2 % (ref 0–0.5)
LYMPHOCYTES # BLD AUTO: 1.77 10*3/MM3 (ref 0.7–3.1)
LYMPHOCYTES NFR BLD AUTO: 34.1 % (ref 19.6–45.3)
MCH RBC QN AUTO: 30.9 PG (ref 26.6–33)
MCHC RBC AUTO-ENTMCNC: 33.3 G/DL (ref 31.5–35.7)
MCV RBC AUTO: 92.8 FL (ref 79–97)
MONOCYTES # BLD AUTO: 0.31 10*3/MM3 (ref 0.1–0.9)
MONOCYTES NFR BLD AUTO: 6 % (ref 5–12)
NEUTROPHILS # BLD AUTO: 3.02 10*3/MM3 (ref 1.7–7)
NEUTROPHILS NFR BLD AUTO: 58.1 % (ref 42.7–76)
NRBC BLD AUTO-RTO: 0.2 /100 WBC (ref 0–0.2)
PLATELET # BLD AUTO: 324 10*3/MM3 (ref 140–450)
PMV BLD AUTO: 11.3 FL (ref 6–12)
POTASSIUM BLD-SCNC: 3.9 MMOL/L (ref 3.5–5.2)
PROT SERPL-MCNC: 7.5 G/DL (ref 6–8.5)
RBC # BLD AUTO: 4.85 10*6/MM3 (ref 3.77–5.28)
SODIUM BLD-SCNC: 136 MMOL/L (ref 136–145)
TSH SERPL DL<=0.05 MIU/L-ACNC: 0.79 MIU/ML (ref 0.27–4.2)
WBC NRBC COR # BLD: 5.19 10*3/MM3 (ref 3.4–10.8)

## 2019-08-22 PROCEDURE — 83036 HEMOGLOBIN GLYCOSYLATED A1C: CPT | Performed by: NURSE PRACTITIONER

## 2019-08-22 PROCEDURE — 80053 COMPREHEN METABOLIC PANEL: CPT | Performed by: NURSE PRACTITIONER

## 2019-08-22 PROCEDURE — 99214 OFFICE O/P EST MOD 30 MIN: CPT | Performed by: NURSE PRACTITIONER

## 2019-08-22 PROCEDURE — 84481 FREE ASSAY (FT-3): CPT | Performed by: NURSE PRACTITIONER

## 2019-08-22 PROCEDURE — 85025 COMPLETE CBC W/AUTO DIFF WBC: CPT | Performed by: NURSE PRACTITIONER

## 2019-08-22 PROCEDURE — 84443 ASSAY THYROID STIM HORMONE: CPT | Performed by: NURSE PRACTITIONER

## 2019-08-22 RX ORDER — GABAPENTIN 400 MG/1
400 CAPSULE ORAL 3 TIMES DAILY
Qty: 90 CAPSULE | Refills: 2 | Status: SHIPPED | OUTPATIENT
Start: 2019-08-22 | End: 2020-03-30 | Stop reason: SDUPTHER

## 2019-08-22 RX ORDER — DICYCLOMINE HCL 20 MG
20 TABLET ORAL EVERY 6 HOURS PRN
Qty: 60 TABLET | Refills: 1 | Status: SHIPPED | OUTPATIENT
Start: 2019-08-22 | End: 2020-05-29 | Stop reason: SDUPTHER

## 2019-08-22 NOTE — PROGRESS NOTES
Subjective   Chief Complaint   Patient presents with   • Hyperglycemia     yesterday 376   • Diarrhea     off and on for 2 weeks       Thelma Michael is a 58 y.o. female here today for worsening of diabetes.  Is been experiencing diarrhea off and on for 2 weeks.  She has been having some abdominal cramping with this.  She denies any nausea or vomiting.  Blood sugars were normal and averaging around 120-150 prior to the diarrhea.  For the past year she has been experiencing some short-term memory loss and confusion.  This has become worse over the past 2 months.  She denies any headaches or blurry vision.  She frequently forgets things that she is doing.  At work she clocks and multiple times and forgets how to do her regular job duties that she does repetitively all day long.  Over the past year she estimates that she is experienced about 10 episodes of left facial numbness and tingling.  The numbness resolves after about 1 day.  She had a significant loss of appetite.  She cannot recall ever having a CT scan or an MRI of her brain.  She is experiencing worsening of right foot pain with burning and tingling.  This is keeping her up at night and she is having a difficult time focusing during the day due to her pain.  She has history of diabetic neuropathy and was prescribed Neurontin 300 mg twice a day.  This has not been helping her.  She denies any shortness of breath or chest pain today.    The following portions of the patient's history were reviewed and updated as appropriate: allergies, current medications, past family history, past medical history, past social history, past surgical history and problem list.    Review of Systems   Constitutional: Positive for fatigue. Negative for activity change, appetite change, chills, diaphoresis, fever, unexpected weight gain and unexpected weight loss.   HENT: Negative.  Negative for ear discharge, ear pain, mouth sores, nosebleeds, sinus pressure, sneezing and sore  throat.    Eyes: Negative.  Negative for pain, discharge and itching.   Respiratory: Negative.  Negative for cough, chest tightness, shortness of breath and wheezing.    Cardiovascular: Negative.  Negative for chest pain and palpitations.   Gastrointestinal: Positive for abdominal pain and diarrhea. Negative for nausea, vomiting, GERD and indigestion.   Endocrine: Negative.  Negative for heat intolerance, polydipsia and polyphagia.   Genitourinary: Negative.  Negative for dysuria, flank pain, frequency, hematuria and urgency.   Musculoskeletal: Positive for arthralgias, back pain and myalgias. Negative for gait problem, joint swelling, neck pain and neck stiffness.   Skin: Negative.  Negative for color change, pallor and rash.   Allergic/Immunologic: Negative.  Negative for immunocompromised state.   Neurological: Positive for numbness, memory problem and confusion. Negative for seizures, speech difficulty and headache.   Hematological: Negative.  Negative for adenopathy.   Psychiatric/Behavioral: Negative for agitation, decreased concentration and depressed mood. The patient is nervous/anxious.        Current Outpatient Medications on File Prior to Visit   Medication Sig   • albuterol sulfate  (90 Base) MCG/ACT inhaler Inhale 2 puffs Every 4 (Four) Hours As Needed for Wheezing.   • Ascorbic Acid (VITAMIN C PO) Vitamin C TABS   • b complex vitamins capsule Take 1 capsule by mouth Daily.   • BD PEN NEEDLE TOOTIE U/F 32G X 4 MM misc USE ONE NEEDLE WITH INSULIN 4 TIMES DAILY   • Blood Glucose Monitoring Suppl (FREESTYLE FREEDOM LITE) w/Device kit 1 kit 3 (Three) Times a Day. TEST; For: Diabetic hypoglycemia, Diabetic peripheral neuropathy   • calcium carbonate (CALCIUM 600) 600 MG tablet Take 1 tablet by mouth Daily.   • carbamide peroxide (DEBROX) 6.5 % otic solution Administer 5 drops into both ears 2 (Two) Times a Day. Use as directed; For: Impacted cerumen of both ears   • Cyanocobalamin (VITAMIN B12 PO)  "Vitamin B12 TABS   • glipiZIDE (GLUCOTROL) 5 MG ER tablet Take 1 tablet by mouth Daily. as directed; For: Diabetic peripheral neuropathy   • glucagon (GLUCAGON EMERGENCY) 1 MG injection Use as directed; For: Diabetics mellitus   • Glucose Blood (BLOOD GLUCOSE TEST) strip 1 each by In Vitro route 3 (Three) Times a Day. One touch strips   • glucose monitor monitoring kit 1 each 3 (Three) Times a Day. One touch glucometer   • hydrochlorothiazide (MICROZIDE) 12.5 MG capsule Take 1 capsule by mouth Daily.   • lidocaine-prilocaine (EMLA) 2.5-2.5 % cream    • lisinopril (PRINIVIL,ZESTRIL) 30 MG tablet Take 1 tablet by mouth Daily.   • ONE TOUCH LANCETS misc 1 each 3 (Three) Times a Day.   • pantoprazole (PROTONIX) 40 MG EC tablet Take 1 tablet by mouth Daily.   • pravastatin (PRAVACHOL) 20 MG tablet Take 1 tablet by mouth Daily.   • [DISCONTINUED] aspirin 325 MG tablet Take 1 tablet by mouth daily.   • [DISCONTINUED] gabapentin (NEURONTIN) 300 MG capsule Take 1 capsule by mouth 2 (Two) Times a Day.   • [DISCONTINUED] Insulin Glargine (LANTUS SOLOSTAR) 100 UNIT/ML injection pen Inject 12 Units under the skin into the appropriate area as directed Every Night.     No current facility-administered medications on file prior to visit.        Objective   Vitals:    08/22/19 1012   BP: 140/88   Pulse: 85   Resp: 16   Temp: 97 °F (36.1 °C)   TempSrc: Temporal   SpO2: 99%   Weight: 59.5 kg (131 lb 3.2 oz)   Height: 172.7 cm (68\")   PainSc:   2   PainLoc: Foot     Body mass index is 19.95 kg/m².    Physical Exam   Constitutional: She is oriented to person, place, and time. She appears well-developed and well-nourished.   HENT:   Head: Normocephalic and atraumatic.   Eyes: EOM are normal. Pupils are equal, round, and reactive to light.   Neck: Normal range of motion.   Cardiovascular: Normal rate, regular rhythm and normal heart sounds.   Pulmonary/Chest: Effort normal and breath sounds normal.   Abdominal: Soft. Bowel sounds are " increased. There is no tenderness.   Musculoskeletal: Normal range of motion.    Thelma had a diabetic foot exam performed today.   During the foot exam she had a monofilament test performed.    Neurological Sensory Findings -  Altered sharp/dull right ankle/foot discrimination.  Skin Integrity  -  Her right foot skin is not intact.  Her left foot skin is not intact..  Neurological: She is alert and oriented to person, place, and time. She has normal strength. GCS eye subscore is 4. GCS verbal subscore is 5. GCS motor subscore is 6.   Skin: Skin is warm and dry.   Psychiatric: Her speech is normal and behavior is normal. Thought content normal. Her mood appears anxious. Cognition and memory are normal.   No memory deficits during appt.    Vitals reviewed.      Assessment/Plan   Thelma was seen today for hyperglycemia and diarrhea.    Diagnoses and all orders for this visit:    Diabetes 1.5, managed as type 2 (CMS/[facility])  -     CBC Auto Differential  -     Comprehensive Metabolic Panel  -     Hemoglobin A1c  -     Insulin Glargine (LANTUS SOLOSTAR) 100 UNIT/ML injection pen; Inject 16 Units under the skin into the appropriate area as directed Every Night.    Memory loss  -     CBC Auto Differential  -     Comprehensive Metabolic Panel  -     Hemoglobin A1c  -     TSH Rfx On Abnormal To Free T4  -     CT Head Without Contrast; Future    Diarrhea, unspecified type  -     CBC Auto Differential  -     Comprehensive Metabolic Panel  -     dicyclomine (BENTYL) 20 MG tablet; Take 1 tablet by mouth Every 6 (Six) Hours As Needed (diarrhea).    Diabetic peripheral neuropathy (CMS/[facility])  -     gabapentin (NEURONTIN) 400 MG capsule; Take 1 capsule by mouth 3 (Three) Times a Day.    Facial numbness  -     CT Head Without Contrast; Future    Confusion  -     CT Head Without Contrast; Future    Decreased appetite  -     CBC Auto Differential  -     Comprehensive Metabolic Panel    Hyperglycemia  -     Hemoglobin A1c    Thyroid  disorder screening  -     TSH Rfx On Abnormal To Free T4    Other orders  -     aspirin 81 MG tablet; Take 1 tablet by mouth Daily.           Current Outpatient Medications:   •  albuterol sulfate  (90 Base) MCG/ACT inhaler, Inhale 2 puffs Every 4 (Four) Hours As Needed for Wheezing., Disp: 18 g, Rfl: 5  •  Ascorbic Acid (VITAMIN C PO), Vitamin C TABS, Disp: , Rfl:   •  b complex vitamins capsule, Take 1 capsule by mouth Daily., Disp: 30 capsule, Rfl: 11  •  BD PEN NEEDLE TOOTIE U/F 32G X 4 MM misc, USE ONE NEEDLE WITH INSULIN 4 TIMES DAILY, Disp: 100 each, Rfl: 1  •  Blood Glucose Monitoring Suppl (FREESTYLE FREEDOM LITE) w/Device kit, 1 kit 3 (Three) Times a Day. TEST; For: Diabetic hypoglycemia, Diabetic peripheral neuropathy, Disp: 1 each, Rfl: 0  •  calcium carbonate (CALCIUM 600) 600 MG tablet, Take 1 tablet by mouth Daily., Disp: 30 tablet, Rfl: 11  •  carbamide peroxide (DEBROX) 6.5 % otic solution, Administer 5 drops into both ears 2 (Two) Times a Day. Use as directed; For: Impacted cerumen of both ears, Disp: 14.8 mL, Rfl: 5  •  Cyanocobalamin (VITAMIN B12 PO), Vitamin B12 TABS, Disp: , Rfl:   •  gabapentin (NEURONTIN) 400 MG capsule, Take 1 capsule by mouth 3 (Three) Times a Day., Disp: 90 capsule, Rfl: 2  •  glipiZIDE (GLUCOTROL) 5 MG ER tablet, Take 1 tablet by mouth Daily. as directed; For: Diabetic peripheral neuropathy, Disp: 30 tablet, Rfl: 11  •  glucagon (GLUCAGON EMERGENCY) 1 MG injection, Use as directed; For: Diabetics mellitus, Disp: , Rfl:   •  Glucose Blood (BLOOD GLUCOSE TEST) strip, 1 each by In Vitro route 3 (Three) Times a Day. One touch strips, Disp: 100 each, Rfl: 11  •  glucose monitor monitoring kit, 1 each 3 (Three) Times a Day. One touch glucometer, Disp: 1 each, Rfl: 0  •  hydrochlorothiazide (MICROZIDE) 12.5 MG capsule, Take 1 capsule by mouth Daily., Disp: 30 capsule, Rfl: 6  •  Insulin Glargine (LANTUS SOLOSTAR) 100 UNIT/ML injection pen, Inject 16 Units under the skin  into the appropriate area as directed Every Night., Disp: 3 mL, Rfl: 5  •  lidocaine-prilocaine (EMLA) 2.5-2.5 % cream, , Disp: , Rfl:   •  lisinopril (PRINIVIL,ZESTRIL) 30 MG tablet, Take 1 tablet by mouth Daily., Disp: 30 tablet, Rfl: 1  •  ONE TOUCH LANCETS misc, 1 each 3 (Three) Times a Day., Disp: 200 each, Rfl: 2  •  pantoprazole (PROTONIX) 40 MG EC tablet, Take 1 tablet by mouth Daily., Disp: 30 tablet, Rfl: 5  •  pravastatin (PRAVACHOL) 20 MG tablet, Take 1 tablet by mouth Daily., Disp: 30 tablet, Rfl: 5  •  aspirin 81 MG tablet, Take 1 tablet by mouth Daily., Disp: 30 tablet, Rfl: 0  •  dicyclomine (BENTYL) 20 MG tablet, Take 1 tablet by mouth Every 6 (Six) Hours As Needed (diarrhea)., Disp: 60 tablet, Rfl: 1       Plan of care reviewed with the patient at the conclusion of today's visit.  Education was provided regarding diagnosis, management, and any prescribed or recommended OTC medications.  Patient verbalized understanding of and agreement with management plan.     Return in about 2 weeks (around 9/5/2019), or if symptoms worsen or fail to improve.      Monet Chau, APRN

## 2019-08-23 DIAGNOSIS — R79.89 LOW TSH LEVEL: Primary | ICD-10-CM

## 2019-08-23 LAB — T3FREE SERPL-MCNC: 3.12 PG/ML (ref 2–4.4)

## 2019-08-27 ENCOUNTER — TELEPHONE (OUTPATIENT)
Dept: INTERNAL MEDICINE | Facility: CLINIC | Age: 59
End: 2019-08-27

## 2019-08-27 NOTE — PROGRESS NOTES
Please contact patient and advise that her blood sugars are running really high and I'm worried about this. She has follow up with me on 9/5 and I'll see how she is doing then. All other labs including thyroid were normal.

## 2019-09-05 ENCOUNTER — TELEPHONE (OUTPATIENT)
Dept: INTERNAL MEDICINE | Facility: CLINIC | Age: 59
End: 2019-09-05

## 2019-09-18 NOTE — TELEPHONE ENCOUNTER
Spoke to patient just notified that she will be coming back to the [facility] When she gets some insurance.

## 2019-10-31 ENCOUNTER — HOSPITAL ENCOUNTER (OUTPATIENT)
Facility: HOSPITAL | Age: 59
Setting detail: OBSERVATION
Discharge: HOME | End: 2019-11-03
Attending: EMERGENCY MEDICINE | Admitting: HOSPITALIST

## 2019-10-31 ENCOUNTER — APPOINTMENT (OUTPATIENT)
Dept: CT IMAGING | Facility: HOSPITAL | Age: 59
End: 2019-10-31

## 2019-10-31 DIAGNOSIS — I10 ELEVATED BLOOD PRESSURE READING WITH DIAGNOSIS OF HYPERTENSION: ICD-10-CM

## 2019-10-31 DIAGNOSIS — G45.9 TIA (TRANSIENT ISCHEMIC ATTACK): Primary | ICD-10-CM

## 2019-10-31 DIAGNOSIS — Z74.09 IMPAIRED MOBILITY AND ADLS: ICD-10-CM

## 2019-10-31 DIAGNOSIS — E11.65 TYPE 2 DIABETES MELLITUS WITH HYPERGLYCEMIA, UNSPECIFIED WHETHER LONG TERM INSULIN USE (HCC): ICD-10-CM

## 2019-10-31 DIAGNOSIS — E78.5 HYPERLIPIDEMIA, UNSPECIFIED HYPERLIPIDEMIA TYPE: ICD-10-CM

## 2019-10-31 DIAGNOSIS — Z78.9 IMPAIRED MOBILITY AND ADLS: ICD-10-CM

## 2019-10-31 PROBLEM — E11.9 TYPE 2 DIABETES MELLITUS: Status: ACTIVE | Noted: 2019-10-31

## 2019-10-31 PROBLEM — G43.909 MIGRAINES: Status: ACTIVE | Noted: 2019-10-31

## 2019-10-31 LAB
ALBUMIN SERPL-MCNC: 4.3 G/DL (ref 3.5–5.2)
ALBUMIN/GLOB SERPL: 1.2 G/DL
ALP SERPL-CCNC: 117 U/L (ref 39–117)
ALT SERPL W P-5'-P-CCNC: 18 U/L (ref 1–33)
ANION GAP SERPL CALCULATED.3IONS-SCNC: 13 MMOL/L (ref 5–15)
APTT PPP: 25.3 SECONDS (ref 24–37)
AST SERPL-CCNC: 16 U/L (ref 1–32)
BASOPHILS # BLD AUTO: 0.03 10*3/MM3 (ref 0–0.2)
BASOPHILS NFR BLD AUTO: 0.4 % (ref 0–1.5)
BILIRUB SERPL-MCNC: 0.4 MG/DL (ref 0.2–1.2)
BILIRUB UR QL STRIP: NEGATIVE
BUN BLD-MCNC: 15 MG/DL (ref 8–23)
BUN BLDA-MCNC: 24 MG/DL (ref 8–26)
BUN/CREAT SERPL: 16.9 (ref 7–25)
CA-I BLDA-SCNC: 1.07 MMOL/L (ref 1.2–1.32)
CALCIUM SPEC-SCNC: 9.6 MG/DL (ref 8.6–10.5)
CHLORIDE BLDA-SCNC: 100 MMOL/L (ref 98–109)
CHLORIDE SERPL-SCNC: 96 MMOL/L (ref 98–107)
CLARITY UR: CLEAR
CO2 BLDA-SCNC: 31 MMOL/L (ref 24–29)
CO2 SERPL-SCNC: 27 MMOL/L (ref 22–29)
COLOR UR: YELLOW
CREAT BLD-MCNC: 0.89 MG/DL (ref 0.57–1)
CREAT BLDA-MCNC: 0.9 MG/DL (ref 0.6–1.3)
DEPRECATED RDW RBC AUTO: 39.3 FL (ref 37–54)
EOSINOPHIL # BLD AUTO: 0.06 10*3/MM3 (ref 0–0.4)
EOSINOPHIL NFR BLD AUTO: 0.9 % (ref 0.3–6.2)
ERYTHROCYTE [DISTWIDTH] IN BLOOD BY AUTOMATED COUNT: 12 % (ref 12.3–15.4)
GFR SERPL CREATININE-BSD FRML MDRD: 78 ML/MIN/1.73
GLOBULIN UR ELPH-MCNC: 3.5 GM/DL
GLUCOSE BLD-MCNC: 317 MG/DL (ref 65–99)
GLUCOSE BLDC GLUCOMTR-MCNC: 361 MG/DL (ref 70–130)
GLUCOSE UR STRIP-MCNC: ABNORMAL MG/DL
HCT VFR BLD AUTO: 45.4 % (ref 34–46.6)
HCT VFR BLDA CALC: 45 % (ref 38–51)
HGB BLD-MCNC: 15.3 G/DL (ref 12–15.9)
HGB BLDA-MCNC: 15.3 G/DL (ref 12–17)
HGB UR QL STRIP.AUTO: NEGATIVE
IMM GRANULOCYTES # BLD AUTO: 0.01 10*3/MM3 (ref 0–0.05)
IMM GRANULOCYTES NFR BLD AUTO: 0.1 % (ref 0–0.5)
INR PPP: 0.9 (ref 0.8–1.2)
INR PPP: 1.1 (ref 0.85–1.16)
KETONES UR QL STRIP: NEGATIVE
LEUKOCYTE ESTERASE UR QL STRIP.AUTO: NEGATIVE
LYMPHOCYTES # BLD AUTO: 2.91 10*3/MM3 (ref 0.7–3.1)
LYMPHOCYTES NFR BLD AUTO: 41.9 % (ref 19.6–45.3)
MCH RBC QN AUTO: 29.8 PG (ref 26.6–33)
MCHC RBC AUTO-ENTMCNC: 33.7 G/DL (ref 31.5–35.7)
MCV RBC AUTO: 88.3 FL (ref 79–97)
MONOCYTES # BLD AUTO: 0.47 10*3/MM3 (ref 0.1–0.9)
MONOCYTES NFR BLD AUTO: 6.8 % (ref 5–12)
NEUTROPHILS # BLD AUTO: 3.47 10*3/MM3 (ref 1.7–7)
NEUTROPHILS NFR BLD AUTO: 49.9 % (ref 42.7–76)
NITRITE UR QL STRIP: NEGATIVE
NRBC BLD AUTO-RTO: 0 /100 WBC (ref 0–0.2)
NT-PROBNP SERPL-MCNC: 105.6 PG/ML (ref 5–900)
PH UR STRIP.AUTO: 7 [PH] (ref 5–8)
PLATELET # BLD AUTO: 298 10*3/MM3 (ref 140–450)
PMV BLD AUTO: 10.9 FL (ref 6–12)
POTASSIUM BLD-SCNC: 3.4 MMOL/L (ref 3.5–5.2)
POTASSIUM BLDA-SCNC: 8.3 MMOL/L (ref 3.5–4.9)
PROT SERPL-MCNC: 7.8 G/DL (ref 6–8.5)
PROT UR QL STRIP: ABNORMAL
PROTHROMBIN TIME: 10.9 SECONDS (ref 12.8–15.2)
PROTHROMBIN TIME: 13.6 SECONDS (ref 11.2–14.3)
RBC # BLD AUTO: 5.14 10*6/MM3 (ref 3.77–5.28)
SODIUM BLD-SCNC: 136 MMOL/L (ref 136–145)
SODIUM BLDA-SCNC: 134 MMOL/L (ref 138–146)
SP GR UR STRIP: 1.04 (ref 1–1.03)
TROPONIN T SERPL-MCNC: <0.01 NG/ML (ref 0–0.03)
UROBILINOGEN UR QL STRIP: ABNORMAL
WBC NRBC COR # BLD: 6.95 10*3/MM3 (ref 3.4–10.8)

## 2019-10-31 PROCEDURE — G0378 HOSPITAL OBSERVATION PER HR: HCPCS

## 2019-10-31 PROCEDURE — 80047 BASIC METABLC PNL IONIZED CA: CPT

## 2019-10-31 PROCEDURE — 85610 PROTHROMBIN TIME: CPT | Performed by: EMERGENCY MEDICINE

## 2019-10-31 PROCEDURE — 99285 EMERGENCY DEPT VISIT HI MDM: CPT

## 2019-10-31 PROCEDURE — 85610 PROTHROMBIN TIME: CPT

## 2019-10-31 PROCEDURE — 85014 HEMATOCRIT: CPT

## 2019-10-31 PROCEDURE — 70498 CT ANGIOGRAPHY NECK: CPT

## 2019-10-31 PROCEDURE — 70450 CT HEAD/BRAIN W/O DYE: CPT

## 2019-10-31 PROCEDURE — 80053 COMPREHEN METABOLIC PANEL: CPT | Performed by: EMERGENCY MEDICINE

## 2019-10-31 PROCEDURE — 85025 COMPLETE CBC W/AUTO DIFF WBC: CPT | Performed by: EMERGENCY MEDICINE

## 2019-10-31 PROCEDURE — 85730 THROMBOPLASTIN TIME PARTIAL: CPT | Performed by: EMERGENCY MEDICINE

## 2019-10-31 PROCEDURE — 84484 ASSAY OF TROPONIN QUANT: CPT | Performed by: EMERGENCY MEDICINE

## 2019-10-31 PROCEDURE — 0042T HC CT CEREBRAL PERFUSION W/WO CONTRAST: CPT

## 2019-10-31 PROCEDURE — 70496 CT ANGIOGRAPHY HEAD: CPT

## 2019-10-31 PROCEDURE — 99220 PR INITIAL OBSERVATION CARE/DAY 70 MINUTES: CPT | Performed by: INTERNAL MEDICINE

## 2019-10-31 PROCEDURE — 83880 ASSAY OF NATRIURETIC PEPTIDE: CPT | Performed by: EMERGENCY MEDICINE

## 2019-10-31 PROCEDURE — 93005 ELECTROCARDIOGRAM TRACING: CPT | Performed by: EMERGENCY MEDICINE

## 2019-10-31 PROCEDURE — 81003 URINALYSIS AUTO W/O SCOPE: CPT | Performed by: EMERGENCY MEDICINE

## 2019-10-31 PROCEDURE — 0 IOPAMIDOL PER 1 ML: Performed by: EMERGENCY MEDICINE

## 2019-10-31 RX ORDER — NICOTINE POLACRILEX 4 MG
15 LOZENGE BUCCAL
Status: DISCONTINUED | OUTPATIENT
Start: 2019-10-31 | End: 2019-11-03 | Stop reason: HOSPADM

## 2019-10-31 RX ORDER — SODIUM CHLORIDE 0.9 % (FLUSH) 0.9 %
10 SYRINGE (ML) INJECTION EVERY 12 HOURS SCHEDULED
Status: DISCONTINUED | OUTPATIENT
Start: 2019-11-01 | End: 2019-11-03 | Stop reason: HOSPADM

## 2019-10-31 RX ORDER — ATORVASTATIN CALCIUM 40 MG/1
80 TABLET, FILM COATED ORAL NIGHTLY
Status: DISCONTINUED | OUTPATIENT
Start: 2019-11-01 | End: 2019-11-03 | Stop reason: HOSPADM

## 2019-10-31 RX ORDER — ASPIRIN 81 MG/1
81 TABLET ORAL DAILY
Status: DISCONTINUED | OUTPATIENT
Start: 2019-11-01 | End: 2019-11-03 | Stop reason: HOSPADM

## 2019-10-31 RX ORDER — DEXTROSE MONOHYDRATE 25 G/50ML
25 INJECTION, SOLUTION INTRAVENOUS
Status: DISCONTINUED | OUTPATIENT
Start: 2019-10-31 | End: 2019-11-03 | Stop reason: HOSPADM

## 2019-10-31 RX ORDER — SODIUM CHLORIDE 0.9 % (FLUSH) 0.9 %
10 SYRINGE (ML) INJECTION AS NEEDED
Status: DISCONTINUED | OUTPATIENT
Start: 2019-10-31 | End: 2019-11-03 | Stop reason: HOSPADM

## 2019-10-31 RX ORDER — ASPIRIN 325 MG
325 TABLET ORAL ONCE
Status: COMPLETED | OUTPATIENT
Start: 2019-10-31 | End: 2019-10-31

## 2019-10-31 RX ORDER — GABAPENTIN 400 MG/1
400 CAPSULE ORAL 3 TIMES DAILY
Status: DISCONTINUED | OUTPATIENT
Start: 2019-11-01 | End: 2019-11-03 | Stop reason: HOSPADM

## 2019-10-31 RX ORDER — PANTOPRAZOLE SODIUM 40 MG/1
40 TABLET, DELAYED RELEASE ORAL DAILY
Status: DISCONTINUED | OUTPATIENT
Start: 2019-11-01 | End: 2019-11-03 | Stop reason: HOSPADM

## 2019-10-31 RX ORDER — HYDROCHLOROTHIAZIDE 12.5 MG/1
12.5 TABLET ORAL DAILY
Status: DISCONTINUED | OUTPATIENT
Start: 2019-11-01 | End: 2019-11-03 | Stop reason: HOSPADM

## 2019-10-31 RX ADMIN — IOPAMIDOL 150 ML: 755 INJECTION, SOLUTION INTRAVENOUS at 20:18

## 2019-10-31 RX ADMIN — SODIUM CHLORIDE, POTASSIUM CHLORIDE, SODIUM LACTATE AND CALCIUM CHLORIDE 500 ML: 600; 310; 30; 20 INJECTION, SOLUTION INTRAVENOUS at 21:37

## 2019-10-31 RX ADMIN — ASPIRIN 325 MG ORAL TABLET 325 MG: 325 PILL ORAL at 20:29

## 2019-11-01 ENCOUNTER — APPOINTMENT (OUTPATIENT)
Dept: MRI IMAGING | Facility: HOSPITAL | Age: 59
End: 2019-11-01

## 2019-11-01 ENCOUNTER — APPOINTMENT (OUTPATIENT)
Dept: CARDIOLOGY | Facility: HOSPITAL | Age: 59
End: 2019-11-01

## 2019-11-01 LAB
BH CV ECHO MEAS - AO MAX PG (FULL): 4.4 MMHG
BH CV ECHO MEAS - AO MAX PG: 8.5 MMHG
BH CV ECHO MEAS - AO MEAN PG (FULL): 1.9 MMHG
BH CV ECHO MEAS - AO MEAN PG: 4.1 MMHG
BH CV ECHO MEAS - AO ROOT AREA (BSA CORRECTED): 1.5
BH CV ECHO MEAS - AO ROOT AREA: 4.9 CM^2
BH CV ECHO MEAS - AO ROOT DIAM: 2.5 CM
BH CV ECHO MEAS - AO V2 MAX: 145.4 CM/SEC
BH CV ECHO MEAS - AO V2 MEAN: 92.5 CM/SEC
BH CV ECHO MEAS - AO V2 VTI: 33.5 CM
BH CV ECHO MEAS - ASC AORTA: 2.8 CM
BH CV ECHO MEAS - AVA(I,A): 1.8 CM^2
BH CV ECHO MEAS - AVA(I,D): 1.8 CM^2
BH CV ECHO MEAS - AVA(V,A): 1.6 CM^2
BH CV ECHO MEAS - AVA(V,D): 1.6 CM^2
BH CV ECHO MEAS - BSA(HAYCOCK): 1.6 M^2
BH CV ECHO MEAS - BSA: 1.7 M^2
BH CV ECHO MEAS - BZI_BMI: 19.7 KILOGRAMS/M^2
BH CV ECHO MEAS - BZI_METRIC_HEIGHT: 170.2 CM
BH CV ECHO MEAS - BZI_METRIC_WEIGHT: 57.2 KG
BH CV ECHO MEAS - EDV(CUBED): 32.5 ML
BH CV ECHO MEAS - EDV(MOD-SP2): 46 ML
BH CV ECHO MEAS - EDV(TEICH): 40.7 ML
BH CV ECHO MEAS - EF(CUBED): 84.6 %
BH CV ECHO MEAS - EF(MOD-SP2): 67.4 %
BH CV ECHO MEAS - EF(TEICH): 79.1 %
BH CV ECHO MEAS - ESV(CUBED): 5 ML
BH CV ECHO MEAS - ESV(MOD-SP2): 15 ML
BH CV ECHO MEAS - ESV(TEICH): 8.5 ML
BH CV ECHO MEAS - FS: 46.5 %
BH CV ECHO MEAS - IVS/LVPW: 1.1
BH CV ECHO MEAS - IVSD: 1.3 CM
BH CV ECHO MEAS - LA DIMENSION: 2.8 CM
BH CV ECHO MEAS - LA/AO: 1.1
BH CV ECHO MEAS - LAD MAJOR: 4 CM
BH CV ECHO MEAS - LAT PEAK E' VEL: 6.8 CM/SEC
BH CV ECHO MEAS - LATERAL E/E' RATIO: 9.6
BH CV ECHO MEAS - LV MASS(C)D: 134.9 GRAMS
BH CV ECHO MEAS - LV MASS(C)DI: 81.2 GRAMS/M^2
BH CV ECHO MEAS - LV MAX PG: 4 MMHG
BH CV ECHO MEAS - LV MEAN PG: 2.2 MMHG
BH CV ECHO MEAS - LV V1 MAX: 100.2 CM/SEC
BH CV ECHO MEAS - LV V1 MEAN: 66.9 CM/SEC
BH CV ECHO MEAS - LV V1 VTI: 25.5 CM
BH CV ECHO MEAS - LVIDD: 3.2 CM
BH CV ECHO MEAS - LVIDS: 1.7 CM
BH CV ECHO MEAS - LVLD AP2: 6.5 CM
BH CV ECHO MEAS - LVLS AP2: 5.4 CM
BH CV ECHO MEAS - LVOT AREA (M): 2.3 CM^2
BH CV ECHO MEAS - LVOT AREA: 2.4 CM^2
BH CV ECHO MEAS - LVOT DIAM: 1.7 CM
BH CV ECHO MEAS - LVPWD: 1.3 CM
BH CV ECHO MEAS - MED PEAK E' VEL: 5.2 CM/SEC
BH CV ECHO MEAS - MEDIAL E/E' RATIO: 12.6
BH CV ECHO MEAS - MV A MAX VEL: 80 CM/SEC
BH CV ECHO MEAS - MV DEC TIME: 0.26 SEC
BH CV ECHO MEAS - MV E MAX VEL: 66.6 CM/SEC
BH CV ECHO MEAS - MV E/A: 0.83
BH CV ECHO MEAS - PA ACC SLOPE: 427.5 CM/SEC^2
BH CV ECHO MEAS - PA ACC TIME: 0.17 SEC
BH CV ECHO MEAS - PA MAX PG: 3.2 MMHG
BH CV ECHO MEAS - PA PR(ACCEL): 2.9 MMHG
BH CV ECHO MEAS - PA V2 MAX: 89.2 CM/SEC
BH CV ECHO MEAS - RVDD: 1.6 CM
BH CV ECHO MEAS - SI(AO): 99.7 ML/M^2
BH CV ECHO MEAS - SI(CUBED): 16.6 ML/M^2
BH CV ECHO MEAS - SI(LVOT): 36 ML/M^2
BH CV ECHO MEAS - SI(MOD-SP2): 18.7 ML/M^2
BH CV ECHO MEAS - SI(TEICH): 19.4 ML/M^2
BH CV ECHO MEAS - SV(AO): 165.6 ML
BH CV ECHO MEAS - SV(CUBED): 27.5 ML
BH CV ECHO MEAS - SV(LVOT): 59.9 ML
BH CV ECHO MEAS - SV(MOD-SP2): 31 ML
BH CV ECHO MEAS - SV(TEICH): 32.2 ML
BH CV ECHO MEAS - TAPSE (>1.6): 1.9 CM2
BH CV ECHO MEAS - TR MAX PG: 21 MMHG
BH CV ECHO MEAS - TR MAX VEL: 228.1 CM/SEC
BH CV ECHO MEAS - TV MAX PG: 1.5 MMHG
BH CV ECHO MEAS - TV V2 MAX: 60.2 CM/SEC
BH CV ECHO MEASUREMENTS AVERAGE E/E' RATIO: 11.1
BH CV XLRA - RV BASE: 3.1 CM
BH CV XLRA - RV LENGTH: 5.4 CM
BH CV XLRA - RV MID: 2.5 CM
BH CV XLRA - TDI S': 13.8 CM/SEC
CHOLEST SERPL-MCNC: 219 MG/DL (ref 0–200)
GLUCOSE BLDC GLUCOMTR-MCNC: 192 MG/DL (ref 70–130)
GLUCOSE BLDC GLUCOMTR-MCNC: 225 MG/DL (ref 70–130)
GLUCOSE BLDC GLUCOMTR-MCNC: 255 MG/DL (ref 70–130)
GLUCOSE BLDC GLUCOMTR-MCNC: 411 MG/DL (ref 70–130)
GLUCOSE BLDC GLUCOMTR-MCNC: 461 MG/DL (ref 70–130)
GLUCOSE BLDC GLUCOMTR-MCNC: 465 MG/DL (ref 70–130)
GLUCOSE BLDC GLUCOMTR-MCNC: 486 MG/DL (ref 70–130)
GLUCOSE BLDC GLUCOMTR-MCNC: 93 MG/DL (ref 70–130)
HBA1C MFR BLD: 14.1 % (ref 4.8–5.6)
HDLC SERPL-MCNC: 58 MG/DL (ref 40–60)
LDLC SERPL CALC-MCNC: 119 MG/DL (ref 0–100)
LDLC/HDLC SERPL: 2.05 {RATIO}
TRIGL SERPL-MCNC: 211 MG/DL (ref 0–150)
VLDLC SERPL-MCNC: 42.2 MG/DL

## 2019-11-01 PROCEDURE — 63710000001 INSULIN DETEMIR PER 5 UNITS: Performed by: HOSPITALIST

## 2019-11-01 PROCEDURE — 63710000001 INSULIN ISOPHANE HUMAN PER 5 UNITS: Performed by: HOSPITALIST

## 2019-11-01 PROCEDURE — G0378 HOSPITAL OBSERVATION PER HR: HCPCS

## 2019-11-01 PROCEDURE — 82962 GLUCOSE BLOOD TEST: CPT

## 2019-11-01 PROCEDURE — 63710000001 INSULIN LISPRO (HUMAN) PER 5 UNITS: Performed by: INTERNAL MEDICINE

## 2019-11-01 PROCEDURE — 80061 LIPID PANEL: CPT | Performed by: INTERNAL MEDICINE

## 2019-11-01 PROCEDURE — 99245 OFF/OP CONSLTJ NEW/EST HI 55: CPT | Performed by: PSYCHIATRY & NEUROLOGY

## 2019-11-01 PROCEDURE — 93306 TTE W/DOPPLER COMPLETE: CPT

## 2019-11-01 PROCEDURE — 99226 PR SBSQ OBSERVATION CARE/DAY 35 MINUTES: CPT | Performed by: HOSPITALIST

## 2019-11-01 PROCEDURE — 97162 PT EVAL MOD COMPLEX 30 MIN: CPT | Performed by: PHYSICAL THERAPIST

## 2019-11-01 PROCEDURE — 97166 OT EVAL MOD COMPLEX 45 MIN: CPT

## 2019-11-01 PROCEDURE — A9577 INJ MULTIHANCE: HCPCS | Performed by: HOSPITALIST

## 2019-11-01 PROCEDURE — 93306 TTE W/DOPPLER COMPLETE: CPT | Performed by: INTERNAL MEDICINE

## 2019-11-01 PROCEDURE — 83036 HEMOGLOBIN GLYCOSYLATED A1C: CPT | Performed by: INTERNAL MEDICINE

## 2019-11-01 PROCEDURE — 70553 MRI BRAIN STEM W/O & W/DYE: CPT

## 2019-11-01 PROCEDURE — 0 GADOBENATE DIMEGLUMINE 529 MG/ML SOLUTION: Performed by: HOSPITALIST

## 2019-11-01 PROCEDURE — G0108 DIAB MANAGE TRN  PER INDIV: HCPCS

## 2019-11-01 RX ORDER — LORAZEPAM 1 MG/1
1 TABLET ORAL ONCE
Status: COMPLETED | OUTPATIENT
Start: 2019-11-01 | End: 2019-11-01

## 2019-11-01 RX ORDER — ACETAMINOPHEN 325 MG/1
650 TABLET ORAL EVERY 4 HOURS PRN
Status: DISCONTINUED | OUTPATIENT
Start: 2019-11-01 | End: 2019-11-03 | Stop reason: HOSPADM

## 2019-11-01 RX ORDER — AMLODIPINE BESYLATE 5 MG/1
5 TABLET ORAL
Status: DISCONTINUED | OUTPATIENT
Start: 2019-11-01 | End: 2019-11-03 | Stop reason: HOSPADM

## 2019-11-01 RX ORDER — LISINOPRIL 40 MG/1
40 TABLET ORAL DAILY
Status: DISCONTINUED | OUTPATIENT
Start: 2019-11-01 | End: 2019-11-03 | Stop reason: HOSPADM

## 2019-11-01 RX ADMIN — INSULIN LISPRO 2 UNITS: 100 INJECTION, SOLUTION INTRAVENOUS; SUBCUTANEOUS at 17:12

## 2019-11-01 RX ADMIN — GABAPENTIN 400 MG: 400 CAPSULE ORAL at 16:43

## 2019-11-01 RX ADMIN — GABAPENTIN 400 MG: 400 CAPSULE ORAL at 21:44

## 2019-11-01 RX ADMIN — INSULIN LISPRO 3 UNITS: 100 INJECTION, SOLUTION INTRAVENOUS; SUBCUTANEOUS at 09:13

## 2019-11-01 RX ADMIN — ACETAMINOPHEN 650 MG: 325 TABLET ORAL at 09:49

## 2019-11-01 RX ADMIN — PANTOPRAZOLE SODIUM 40 MG: 40 TABLET, DELAYED RELEASE ORAL at 09:08

## 2019-11-01 RX ADMIN — SODIUM CHLORIDE 500 ML: 9 INJECTION, SOLUTION INTRAVENOUS at 11:43

## 2019-11-01 RX ADMIN — HYDROCHLOROTHIAZIDE 12.5 MG: 12.5 TABLET ORAL at 09:08

## 2019-11-01 RX ADMIN — LORAZEPAM 1 MG: 1 TABLET ORAL at 08:03

## 2019-11-01 RX ADMIN — LISINOPRIL 40 MG: 40 TABLET ORAL at 09:08

## 2019-11-01 RX ADMIN — ACETAMINOPHEN 650 MG: 325 TABLET ORAL at 04:43

## 2019-11-01 RX ADMIN — ATORVASTATIN CALCIUM 80 MG: 40 TABLET, FILM COATED ORAL at 21:44

## 2019-11-01 RX ADMIN — GADOBENATE DIMEGLUMINE 10 ML: 529 INJECTION, SOLUTION INTRAVENOUS at 09:30

## 2019-11-01 RX ADMIN — AMLODIPINE BESYLATE 5 MG: 5 TABLET ORAL at 09:12

## 2019-11-01 RX ADMIN — INSULIN DETEMIR 10 UNITS: 100 INJECTION, SOLUTION SUBCUTANEOUS at 21:43

## 2019-11-01 RX ADMIN — ASPIRIN 81 MG: 81 TABLET, COATED ORAL at 09:07

## 2019-11-01 RX ADMIN — INSULIN LISPRO 7 UNITS: 100 INJECTION, SOLUTION INTRAVENOUS; SUBCUTANEOUS at 21:43

## 2019-11-01 RX ADMIN — INSULIN HUMAN 10 UNITS: 100 INJECTION, SUSPENSION SUBCUTANEOUS at 09:14

## 2019-11-01 RX ADMIN — GABAPENTIN 400 MG: 400 CAPSULE ORAL at 09:08

## 2019-11-01 NOTE — PROGRESS NOTES
"                  Clinical Nutrition     Reason for Visit:   Identified at risk by screening criteria, MST score 2+    Patient Name: Thelma Michael  YOB: 1960  MRN: [id number]  Date of Encounter: 11/01/19 9:42 AM  Admission date: 10/31/2019    Nutrition Assessment   Assessment     Admission diagnosis  ?TIA    Additional diagnosis/conditions/procedures this admission  Transient facial droop/dysarthria    Additional PMH/procedures:  HTN  HLP  GERD  T2DM  Tobacco    Dental procedure    Reported/Observed/Food/Nutrition Related History:      Patient sleeping at visit. Daughter at bedside provided information. Estimates patients UBW to be between 126 - 135 lbs. Believes she is probably weighing closer to 126 at this point. Daughter reports patients appetite waxes and wanes. States she could probably eat better food options and sometimes she has to make herself eat. Reports patient ate well for breakfast this AM. Daughter requesting nutrition education later this afternoon. RD followed up with information after lunch. Patient eating at visit. RN entered to explain to patient why she was still feeling groggy from medication for MRI this AM. Briefly discussed carbohydrate counting and routine eating before patient started crying. She told daughter she was feeling emotional. Left written materials and advised will follow up at a later date.      Anthropometrics     Height: 170.2 cm (67\")  Last filed wt: Weight: 57.2 kg (126 lb) (10/31/19 1951)  Weight Method: Stated    BMI: BMI (Calculated): 19.7  Normal: 18.5-24.9kg/m2    Ideal Body Weight (IBW) (kg): 61.86  Admission wt: 126 lb  Method obtained: stated weight per charting 10/31    Weight Change:  UBW: 135 lbs reported by patient on visit 5/2019  Weight change:    % wt change:   Time frame of weight loss:     RD notes per previous admission (5/2019) patient reported UBW to be 135 lbs  Standing scale weight (5/29/19) was 127 lbs    Labs reviewed     Results " from last 7 days   Lab Units 10/31/19  2047 10/31/19  1953   GLUCOSE mg/dL 317*  --    BUN mg/dL 15  --    CREATININE mg/dL 0.89 0.90   SODIUM mmol/L 136  --    CHLORIDE mmol/L 96*  --    POTASSIUM mmol/L 3.4*  --    ALT (SGPT) U/L 18  --      Results from last 7 days   Lab Units 11/01/19  0605 10/31/19  2047   ALBUMIN g/dL  --  4.30   CHOLESTEROL mg/dL 219*  --    TRIGLYCERIDES mg/dL 211*  --        Results from last 7 days   Lab Units 11/01/19  0910 11/01/19  0524 11/01/19  0520 11/01/19  0105 10/31/19  1953   GLUCOSE mg/dL 225* 486* 465* 255* 361*     Lab Results   Lab Value Date/Time    HGBA1C 14.10 (H) 11/01/2019 0605    HGBA1C 9.50 (H) 08/22/2019 1049       Medications reviewed   Pertinent: gabapentin, insulin      Nutrition Focused Physical Exam     Unable to perform exam due to: patient sleeping/upset    Current Nutrition Prescription     PO: Diet Regular; Cardiac, Consistent Carbohydrate  Intake: insuff data      Nutrition Diagnosis     11/1  Problem Altered nutrition related laboratory values   Etiology ?dietary/lifestyle choices and medical noncompliance   Signs/Symptoms HgbA1c = 14.1%       Nutrition Intervention   1.  Follow treatment progress, Care plan reviewed  2.  Advise alternate selection, Interview for preferences, Menu provided   3. Supplement ordered - Chocolate Premier Protein x2/d  4. Encouraged oral / supplemental intake  5. RD notes HgbA1c of 14.1%, attempted to provide education but unable to finish. Written materials left. Will follow up as able.    Goal:   General: Nutrition support treatment  PO: Establish PO    Long term goals: HgbA1c < 7.00%      Monitoring/Evaluation:   Per protocol, PO intake, Supplement intake, Pertinent labs, Weight    Will Continue to follow per protocol    Aysha Sequeira RDN, LD  Time Spent: 35 minutes

## 2019-11-01 NOTE — H&P
[facility] Medicine Services  HISTORY AND PHYSICAL    Patient Name: Thelma Michael  : 1960  MRN: [id number]  Primary Care Physician: Monet Clark APRN  Date of admission: 10/31/2019      Subjective   Subjective     Chief Complaint: Slurred speech and facial droop    HPI:  Thelma Michael is a 58 y.o. female with past medical history of hypertension, hyperlipidemia, type 2 diabetes, migraines, ongoing tobacco abuse.  Patient presents to the ED with complaints of slurred speech and facial droop.  At the time of my evaluation symptoms have since resolved.  Patient however does endorse having similar episodes, about 5-6, in the past couple of years, most of them last about 10 minutes.  She denies having any recent illness, no nausea or vomiting, she does endorse a mild headache, otherwise has been reasonably feeling well.    Review of Systems   Gen- No fevers, chills  CV- No chest pain, palpitations  Resp- No cough, dyspnea  GI- No N/V/D, abd pain      All other systems reviewed and are negative.     Personal History     Past Medical History:   Diagnosis Date   • Acid reflux    • Acute bronchitis    • Cardiac murmur    • Diabetes mellitus (CMS/[facility])    • H/O echocardiogram 2012    i. LVEF 65%.ii. Mild LVH.iii. Borderline evidence of atrial septal aneurysm.  No PFO.    • History of nuclear stress test 2014    Negative for ischemia and scars; LVEF 77%.     • Hyperlipidemia    • Hypertension    • Impacted cerumen of both ears    • Migraine    • Sinusitis    • Tobacco abuse     quit 4 days ago.     • Urticaria        Past Surgical History:   Procedure Laterality Date   • DENTAL PROCEDURE     • NO PAST SURGERIES         Family History: family history includes ADD / ADHD in an other family member; Anxiety disorder in an other family member; Arthritis in an other family member; Depression in an other family member; Diabetes in an other family member; Heart  disease in an other family member; Hyperlipidemia in an other family member; Hypertension in an other family member; Lung cancer in an other family member; Osteoporosis in an other family member. Otherwise pertinent FHx was reviewed and unremarkable.     Social History:  reports that she quit smoking about 5 months ago. She has never used smokeless tobacco. She reports that she drinks about 0.6 oz of alcohol per week. She reports that she does not use drugs.  Social History     Social History Narrative   • Not on file       Medications:    Available home medication information reviewed.    (Not in a hospital admission)    No Known Allergies    Objective   Objective     Vital Signs:   Temp:  [98.5 °F (36.9 °C)] 98.5 °F (36.9 °C)  Heart Rate:  [69-88] 75  Resp:  [20] 20  BP: (168-192)/() 185/107   Total (NIH Stroke Scale): 0    Physical Exam   Constitutional: Awake, alert  Eyes: PERRLA, sclerae anicteric, no conjunctival injection  HENT: NCAT, mucous membranes moist  Neck: Supple, no thyromegaly, no lymphadenopathy, trachea midline  Respiratory: Clear to auscultation bilaterally, nonlabored respirations   Cardiovascular: RRR, no murmurs, rubs, or gallops, palpable pedal pulses bilaterally  Gastrointestinal: Positive bowel sounds, soft, nontender, nondistended  Musculoskeletal: No bilateral ankle edema, no clubbing or cyanosis to extremities  Psychiatric: Appropriate affect, cooperative  Neurologic: Oriented x 3, strength symmetric in all extremities, Cranial Nerves grossly intact to confrontation, speech clear  Skin: No rashes)    Results Reviewed:  I have personally reviewed current lab and radiology data.    Results from last 7 days   Lab Units 10/31/19  2047   WBC 10*3/mm3 6.95   HEMOGLOBIN g/dL 15.3   HEMATOCRIT % 45.4   PLATELETS 10*3/mm3 298   INR  1.10     Results from last 7 days   Lab Units 10/31/19  2047   SODIUM mmol/L 136   POTASSIUM mmol/L 3.4*   CHLORIDE mmol/L 96*   CO2 mmol/L 27.0   BUN mg/dL 15    CREATININE mg/dL 0.89   GLUCOSE mg/dL 317*   CALCIUM mg/dL 9.6   ALT (SGPT) U/L 18   AST (SGOT) U/L 16   TROPONIN T ng/mL <0.010   PROBNP pg/mL 105.6     Estimated Creatinine Clearance: 60.7 mL/min (by C-G formula based on SCr of 0.89 mg/dL).  Brief Urine Lab Results  (Last result in the past 365 days)      Color   Clarity   Blood   Leuk Est   Nitrite   Protein   CREAT   Urine HCG        10/31/19 2040 Yellow Clear Negative Negative Negative Trace             Imaging Results (Last 24 Hours)     Procedure Component Value Units Date/Time    CT Angiogram Neck [[id number]] Collected:  10/31/19 2052     Updated:  10/31/19 2054    Narrative:       CTA Neck    This was included in the report of the CTA head. Please see the report of the CTA head for the complete discussion of both the CTA neck and the CTA head.    Signer Name: Suoleymane Clements MD   Signed: 10/31/2019 8:52 PM   Workstation Name: [id number]    [facility]    CT Angiogram Head [[id number]] Collected:  10/31/19 2044     Updated:  10/31/19 2046    Narrative:           CTA of the neck and head    Technique: CT angio head and neck following administration of 100 cc Isovue-370 IV contrast, including coronal and sagittal MIP 3-D reformatted images.     Indication: Stroke    Comparison: No pertinent prior study    TECHNIQUE:   CTA of the head and neck with contrast. Coronal and sagittal reconstructions were obtained.  Radiation dose reduction techniques included automated exposure control or exposure modulation based on body size. Radiation audit for number of CT and nuclear  cardiology exams performed in the last year: 1.     Evaluation for a significant carotid arterial stenosis is based on the NASCET criteria.    Findings:      CT angiography neck:     The visualized aortic arch and its major branch vessels are within normal limits.    Bilateral common and internal carotid arteries as well as both carotid bifurcations are of normal course  and caliber.    Extracranial vertebral arteries are of normal course and caliber. The vertebral arteries appear codominant.      CT angiography head:     The intracranial portions of the internal carotid arteries are of normal course and caliber.    MCA and MIMI vessels are patent bilaterally. There is the normal variant of a hypoplastic A1 segment on the right side. There is a symmetric distal runoff intracranially without evidence of aneurysm or flow-limiting stenosis.    Basilar artery is widely patent. Bilateral posterior cerebral arteries demonstrate symmetric distal runoff without evidence of aneurysm or flow-limiting stenosis.     Major dural venous sinuses show no evidence of filling defect.         Impression:           No evidence of flow-limiting stenosis or aneurysm.    Signer Name: Souleymane Clements MD   Signed: 10/31/2019 8:44 PM   Workstation Name: [id number]    [facility]    CT Cerebral Perfusion With & Without Contrast [[id number]] Collected:  10/31/19 2033     Updated:  10/31/19 2035    Narrative:       CT CEREBRAL PERFUSION W WO CONTRAST    HISTORY:   TIA    TECHNIQUE:   Axial CT images of the brain without and with intravenous contrast using cerebral perfusion protocol. Post-processing parametric maps were created using RAPID software and reviewed. Radiation dose reduction techniques included automated exposure control  or exposure modulation based on body size. CT and nuclear cardiology exams in the last 12 months: 1.    COMPARISON:   No pertinent prior study    FINDINGS:   Arterial input function is optimal.     *  Perfusion parameters:    *  Ischemic tissue volume (Tmax > 6 sec.): 0 mL.  *  Infarct core volume (CBF < 30%): 0 mL.  *  Mismatch (penumbra) volume: 0 mL.  *  Mismatch ratio: None  *  Location/territory: Not applicable.    COLLATERAL CIRCULATION PARAMETERS:    *  Volume poor collateral perfusion (Tmax > 10 sec.): 0 mL.  *  Hypoperfusion index (Tmax >10 sec./Tmax  > 6 sec.): Not applicable.  *  CBV Index (rCBV in Tmax > 6 sec. region): Not applicable.        Impression:       Normal brain perfusion CT.          Signer Name: Souleymane Clements MD   Signed: 10/31/2019 8:33 PM   Workstation Name: [email or web address]    [facility]    CT Head Without Contrast Stroke Protocol [[id number]] Collected:  10/31/19 1948     Updated:  10/31/19 1950    Narrative:       CT Head WO Code Stroke    HISTORY:   Acute onset of facial droop and slurred speech    TECHNIQUE:   Axial unenhanced head CT. Radiation dose reduction techniques included automated exposure control or exposure modulation based on body size. Count of known CT and cardiac nuc med studies performed in previous 12 months: 1.     Time of scan: 7:36 PM    COMPARISON:   No pertinent prior study    FINDINGS:   The ventricles are mildly generous in size. Cortical sulci are correspondingly prominent. No extraaxial fluid collections are seen.    No parenchymal or subarachnoid hemorrhage is present. Prominent periventricular hypodensities are demonstrated which are nonspecific but likely represent white matter microvascular change. No CT evidence of mass or acute infarct.    The mastoid air cells are clear. The visualized paranasal sinuses demonstrate inflammatory change in the left side of the sphenoid sinus and the ethmoid air cells..  Orbital structures demonstrate no acute findings.      Impression:       Impression:  1. No clearly acute intracranial pathology demonstrated.  2. Mild cerebral atrophy and prominent periventricular hypodensities which are thought to represent white matter microvascular change.  3. Report called to Lobito Martinez at 7:43 PM, Thursday, October 31, 2019.    Signer Name: Souleymane Clements MD   Signed: 10/31/2019 7:48 PM   Workstation Name: [email or web address]    [facility]             Assessment/Plan   Assessment / Plan     Active Hospital Problems    Diagnosis POA   • **TIA  (transient ischemic attack) [G45.9] Yes   • Type 2 diabetes mellitus (CMS/HCC) [E11.9] Yes   • Migraines [G43.909] Yes   • Hyperlipidemia [E78.5] Yes   • Hypertension [I10] Yes     58-year-old female with past medical history of type 2 diabetes, hypertension, hyperlipidemia.  Patient presents to the ED with acute onset slurred speech and facial droop since resolved, she has been admitted for suspected TIA.    Plan  -Slurred speech and facial droop, symptoms suspicious for TIA, She is s/p CTA head and neck was unremarkable, CT head was unrevealing, CT cerebral perfusion was equally normal, will continue stroke work-up, get MRI, echo neurology consult, continue aspirin and statin.   -Hypertension, BP is poorly controlled, will continue home lisinopril, hydrochlorothiazide, add PRN  -Hyperlipidemia, she is on pravastatin at home we will switch to high-dose statin  -Poorly controlled type 2 diabetes, A1c 9.5%, with hyperglycemia, continue SSI for now  -PT/OT/CM    DVT prophylaxis: Mechanical    CODE STATUS:    Code Status and Medical Interventions:   Ordered at: 10/31/19 1431     Level Of Support Discussed With:    Patient     Code Status:    CPR     Medical Interventions (Level of Support Prior to Arrest):    Full       Admission Status:  I believe this patient meets OBSERVATION status, however if further evaluation or treatment plans warrant, status may change.  Based upon current information, I predict patient's care encounter to be less than or equal to 2 midnights.      Electronically signed by Chris Medrano MD, 10/31/19, 10:14 PM.

## 2019-11-01 NOTE — CONSULTS
Referring Provider: Dr. Jean  Reason for Consultation: Slurred speech and facial droop    Patient Care Team:  Monet Clark APRN as PCP - General (Nurse Practitioner)  Sharita García DO (Inactive) as PCP - Family Medicine    Chief complaint altered speech and facial droop    Subjective .     History of present illness: 58-year-old woman with PMH notable for diabetes since her 20's, HLD, HTN and migraine admitted via the ED last night for two episodes lasting 10 to 15 minutes of slurred or abnormal speech and right facial numbness with possible droop that she noted while talking with her daughter. She reports one episode yesterday morning and one in the evening, with no obvious trigger. With one episode she had a mild headache.   She reports similar symptoms in the past about 5 times over the last 5 years.  She notes no vision changes, n/v, or limb weakness. She has had left arm numbness with some episodes in the past, and believes that the facial numbness has been on the left at times. She has a remote h/o migraines, she denies recent fever or chills, chest pain or palpitations, shortness of breath, cough, nausea or vomiting or other illness.  She also notes that a few years ago she had an episode while driving of waiting out of her entire vision briefly followed by images crossed from one side to the other that then resolved.  She believes this may have been related to her diabetes which has been difficult to control.  She also reports that one time walking down the hallway some years ago her legs gave way and she could not move them briefly and she related this to her previous low back injury, but it has not recurred.  Denies any other weakness.  She is to take aspirin 325 mg daily but states she was advised by her   doctor to stop that and start baby aspirin.  She discontinued the aspirin 325 mg but had not yet gotten to the store to buy 81 mg pills and has been on no antiplatelet  therapy.    Review of Systems  Pertinent items are noted in HPI, all other systems reviewed and negative     History  Past Medical History:   Diagnosis Date   • Acid reflux    • Acute bronchitis    • Cardiac murmur    • Diabetes mellitus (CMS/HCC)    • H/O echocardiogram 2012    i. LVEF 65%.ii. Mild LVH.iii. Borderline evidence of atrial septal aneurysm.  No PFO.    • History of nuclear stress test 2014    Negative for ischemia and scars; LVEF 77%.     • Hyperlipidemia    • Hypertension    • Impacted cerumen of both ears    • Migraine    • Sinusitis    • Tobacco abuse     quit 4 days ago.     • Urticaria    ,   Past Surgical History:   Procedure Laterality Date   • DENTAL PROCEDURE     • NO PAST SURGERIES     ,   Family History   Problem Relation Age of Onset   • Anxiety disorder Other    • Arthritis Other    • ADD / ADHD Other    • Heart disease Other         cardiac disorder   • Depression Other    • Diabetes Other    • Hyperlipidemia Other    • Hypertension Other    • Lung cancer Other    • Osteoporosis Other    ,   Social History     Tobacco Use   • Smoking status: Former Smoker     Last attempt to quit: 2019     Years since quittin.4   • Smokeless tobacco: Never Used   • Tobacco comment: [facility] never smoker    Substance Use Topics   • Alcohol use: Yes     Alcohol/week: 0.6 oz     Types: 1 Glasses of wine per week     Comment: ocassional   • Drug use: No   ,   Medications Prior to Admission   Medication Sig Dispense Refill Last Dose   • albuterol sulfate  (90 Base) MCG/ACT inhaler Inhale 2 puffs Every 4 (Four) Hours As Needed for Wheezing. 18 g 5 Taking   • Ascorbic Acid (VITAMIN C PO) Vitamin C TABS   Taking   • aspirin 81 MG tablet Take 1 tablet by mouth Daily. 30 tablet 0    • b complex vitamins capsule Take 1 capsule by mouth Daily. 30 capsule 11 Taking   • BD PEN NEEDLE TOOTIE U/F 32G X 4 MM misc USE ONE NEEDLE WITH INSULIN 4 TIMES DAILY 100 each 1 Taking   • Blood Glucose  Monitoring Suppl (FREESTYLE FREEDOM LITE) w/Device kit 1 kit 3 (Three) Times a Day. TEST; For: Diabetic hypoglycemia, Diabetic peripheral neuropathy 1 each 0 Taking   • calcium carbonate (CALCIUM 600) 600 MG tablet Take 1 tablet by mouth Daily. 30 tablet 11 Taking   • carbamide peroxide (DEBROX) 6.5 % otic solution Administer 5 drops into both ears 2 (Two) Times a Day. Use as directed; For: Impacted cerumen of both ears 14.8 mL 5 Taking   • Cyanocobalamin (VITAMIN B12 PO) Vitamin B12 TABS   Taking   • dicyclomine (BENTYL) 20 MG tablet Take 1 tablet by mouth Every 6 (Six) Hours As Needed (diarrhea). 60 tablet 1    • gabapentin (NEURONTIN) 400 MG capsule Take 1 capsule by mouth 3 (Three) Times a Day. 90 capsule 2    • glipiZIDE (GLUCOTROL) 5 MG ER tablet Take 1 tablet by mouth Daily. as directed; For: Diabetic peripheral neuropathy 30 tablet 11 Taking   • glucagon (GLUCAGON EMERGENCY) 1 MG injection Use as directed; For: Diabetics mellitus   Taking   • Glucose Blood (BLOOD GLUCOSE TEST) strip 1 each by In Vitro route 3 (Three) Times a Day. One touch strips 100 each 11 Taking   • glucose monitor monitoring kit 1 each 3 (Three) Times a Day. One touch glucometer 1 each 0 Taking   • hydrochlorothiazide (MICROZIDE) 12.5 MG capsule Take 1 capsule by mouth Daily. 30 capsule 6 Taking   • Insulin Glargine (LANTUS SOLOSTAR) 100 UNIT/ML injection pen Inject 16 Units under the skin into the appropriate area as directed Every Night. 3 mL 5    • lidocaine-prilocaine (EMLA) 2.5-2.5 % cream    Taking   • lisinopril (PRINIVIL,ZESTRIL) 30 MG tablet Take 1 tablet by mouth Daily. 30 tablet 1 Taking   • ONE TOUCH LANCETS misc 1 each 3 (Three) Times a Day. 200 each 2 Taking   • pantoprazole (PROTONIX) 40 MG EC tablet Take 1 tablet by mouth Daily. 30 tablet 5 Taking   • pravastatin (PRAVACHOL) 20 MG tablet Take 1 tablet by mouth Daily. 30 tablet 5 Taking   , Scheduled Meds:    amLODIPine 5 mg Oral Q24H   aspirin 81 mg Oral Daily  "  atorvastatin 80 mg Oral Nightly   gabapentin 400 mg Oral TID   hydrochlorothiazide 12.5 mg Oral Daily   insulin detemir 10 Units Subcutaneous Nightly   insulin lispro 0-7 Units Subcutaneous 4x Daily With Meals & Nightly   lisinopril 40 mg Oral Daily   pantoprazole 40 mg Oral Daily   sodium chloride 10 mL Intravenous Q12H   , Continuous Infusions:    niCARdipine 5-15 mg/hr Last Rate: Stopped (11/01/19 0017)   , PRN Meds:  •  acetaminophen  •  dextrose  •  dextrose  •  glucagon (human recombinant)  •  influenza vaccine  •  sodium chloride and Allergies:  Patient has no known allergies.    Objective     Vital Signs   Blood pressure 166/98, pulse 76, temperature 97.9 °F (36.6 °C), temperature source Oral, resp. rate 16, height 170.2 cm (67\"), weight 57.2 kg (126 lb), SpO2 97 %.    Physical Exam:   Thin middle-aged -American woman in no acute distress, somewhat drowsy after receiving Ativan.  She is fully oriented, with normal language, memory and fund of knowledge, mildly impaired attention consistent with drowsiness.  No dysarthria.  Pupils 2.5 mm and reactive, visual fields full to confrontation, extraocular movements intact, normal facial sensation and movement, hearing intact to voice, palate and shoulders elevate symmetrically and tongue protrudes midline  Motor: 5-/5 throughout with no pronator drift  Coordination intact in upper and lower extremities  Muscle tone is normal  Light touch is symmetric with stocking sensory loss  Reflexes trace to 1+, absent ankle jerks, no response to plantar stim  Neck supple, no carotid bruit appreciated  Heart RRR no murmur appreciated  Lungs clear to auscultation, normal respiratory effort  Abdomen soft, NT, ND with positive bowel sounds  Extremities without cyanosis or edema  Skin warm and dry, no rashes appreciated    Results Review:   I reviewed the patient's new clinical results.  I reviewed the patient's new imaging results and agree with the interpretation.  I " reviewed the patient's other test results and agree with the interpretation  Labs reviewed, A1c 14.1  Total cholesterol 219 triglycerides 211 HDL 50   CMP with glucose 317 potassium 3.4 chloride 96 otherwise normal  CBC unremarkable with normal platelets  UA with greater than thousand glucose, trace protein  CTA head neck images reviewed, agree unremarkable, likewise CT perfusion  Brain MRI images reviewed, chronic deep white matter changes are seen, some increased signal on diffusion images but without corresponding change on ADC mapping.  Echo pending    Assessment/Plan       TIA (transient ischemic attack)    Hyperlipidemia    Hypertension    Type 2 diabetes mellitus (CMS/HCC)      Discussed with patient that although focal neurologic symptoms are quite possible with migraines, and that that might explain some focal symptoms in the past, I am concerned that this was a TIA.  Discussed work-up and potential mechanisms of stroke as well as risk factors.  I agree with low-dose aspirin and high-dose statin.  If echo is unremarkable then could be discharged home with neurology follow-up.       I discussed the patients findings and my recommendations with patient and family    Margaret Martin MD  11/01/19  9:52 AM

## 2019-11-01 NOTE — PROGRESS NOTES
[facility] Medicine Services  PROGRESS NOTE    Patient Name: Thelma Michael  : 1960  MRN: [id number]    Date of Admission: 10/31/2019  Primary Care Physician: Monet Clark APRN    Subjective   Subjective     CC:  Slurred speech/facial droop    HPI:  No numbness/weakness/tingling. Speech normal. No facial droop. No n/v. No dyspnea. No f/c    Review of Systems  Gen- No fevers, chills  CV- No chest pain, palpitations  Resp- No cough, dyspnea  GI- No N/V/D, abd pain    All other ROS negative except for what's listed above/HPI    Objective   Objective     Vital Signs:   Temp:  [97.7 °F (36.5 °C)-98.5 °F (36.9 °C)] 97.9 °F (36.6 °C)  Heart Rate:  [69-88] 76  Resp:  [16-20] 16  BP: (168-192)/() 186/91  Total (NIH Stroke Scale): 1     Physical Exam:  NAD, alert and oriented  OP clear, MMM  PERRL, face symmetric, speech clear  Neck supple  No LAD  RRR  CTAB  +BS, ND, NT  MARCUM  No gross neurologic deficits  No rashes  Normal affect, pleasant    Results Reviewed:    Results from last 7 days   Lab Units 10/31/19  2047 10/31/19  1953 10/31/19  1952   WBC 10*3/mm3 6.95  --   --    HEMOGLOBIN g/dL 15.3  --   --    HEMOGLOBIN, POC g/dL  --  15.3  --    HEMATOCRIT % 45.4  --   --    HEMATOCRIT POC %  --  45  --    PLATELETS 10*3/mm3 298  --   --    INR  1.10  --  0.9     Results from last 7 days   Lab Units 10/31/19  2047 10/31/19  1953   SODIUM mmol/L 136  --    POTASSIUM mmol/L 3.4*  --    CHLORIDE mmol/L 96*  --    CO2 mmol/L 27.0  --    BUN mg/dL 15  --    CREATININE mg/dL 0.89 0.90   GLUCOSE mg/dL 317*  --    CALCIUM mg/dL 9.6  --    ALT (SGPT) U/L 18  --    AST (SGOT) U/L 16  --    TROPONIN T ng/mL <0.010  --    PROBNP pg/mL 105.6  --      Estimated Creatinine Clearance: 60.7 mL/min (by C-G formula based on SCr of 0.89 mg/dL).    Microbiology Results Abnormal     None          Imaging Results (Last 24 Hours)     Procedure Component Value Units Date/Time    CT Angiogram  Neck [[id number]] Collected:  10/31/19 2052     Updated:  10/31/19 2054    Narrative:       CTA Neck    This was included in the report of the CTA head. Please see the report of the CTA head for the complete discussion of both the CTA neck and the CTA head.    Signer Name: Souleymane Clements MD   Signed: 10/31/2019 8:52 PM   Workstation [facility]: [facility]    [facility]    CT Angiogram Head [[id number]] Collected:  10/31/19 2044     Updated:  10/31/19 2046    Narrative:           CTA of the neck and head    Technique: CT angio head and neck following administration of 100 cc Isovue-370 IV contrast, including coronal and sagittal MIP 3-D reformatted images.     Indication: Stroke    Comparison: No pertinent prior study    TECHNIQUE:   CTA of the head and neck with contrast. Coronal and sagittal reconstructions were obtained.  Radiation dose reduction techniques included automated exposure control or exposure modulation based on body size. Radiation audit for number of CT and nuclear  cardiology exams performed in the last year: 1.     Evaluation for a significant carotid arterial stenosis is based on the NASCET criteria.    Findings:      CT angiography neck:     The visualized aortic arch and its major branch vessels are within normal limits.    Bilateral common and internal carotid arteries as well as both carotid bifurcations are of normal course and caliber.    Extracranial vertebral arteries are of normal course and caliber. The vertebral arteries appear codominant.      CT angiography head:     The intracranial portions of the internal carotid arteries are of normal course and caliber.    MCA and MIMI vessels are patent bilaterally. There is the normal variant of a hypoplastic A1 segment on the right side. There is a symmetric distal runoff intracranially without evidence of aneurysm or flow-limiting stenosis.    Basilar artery is widely patent. Bilateral posterior cerebral arteries demonstrate  symmetric distal runoff without evidence of aneurysm or flow-limiting stenosis.     Major dural venous sinuses show no evidence of filling defect.         Impression:           No evidence of flow-limiting stenosis or aneurysm.    Signer Name: Souleymane Clements MD   Signed: 10/31/2019 8:44 PM   Workstation [facility]: [facility]    [facility]    CT Cerebral Perfusion With & Without Contrast [[id number]] Collected:  10/31/19 2033     Updated:  10/31/19 2035    Narrative:       CT CEREBRAL PERFUSION W WO CONTRAST    HISTORY:   TIA    TECHNIQUE:   Axial CT images of the brain without and with intravenous contrast using cerebral perfusion protocol. Post-processing parametric maps were created using RAPID software and reviewed. Radiation dose reduction techniques included automated exposure control  or exposure modulation based on body size. CT and nuclear cardiology exams in the last 12 months: 1.    COMPARISON:   No pertinent prior study    FINDINGS:   Arterial input function is optimal.     *  Perfusion parameters:    *  Ischemic tissue volume (Tmax > 6 sec.): 0 mL.  *  Infarct core volume (CBF < 30%): 0 mL.  *  Mismatch (penumbra) volume: 0 mL.  *  Mismatch ratio: None  *  Location/territory: Not applicable.    COLLATERAL CIRCULATION PARAMETERS:    *  Volume poor collateral perfusion (Tmax > 10 sec.): 0 mL.  *  Hypoperfusion index (Tmax >10 sec./Tmax > 6 sec.): Not applicable.  *  CBV Index (rCBV in Tmax > 6 sec. region): Not applicable.        Impression:       Normal brain perfusion CT.          Signer Name: Souleymane Clements MD   Signed: 10/31/2019 8:33 PM   Workstation Name: [facility]    [facility]    CT Head Without Contrast Stroke Protocol [[id number]] Collected:  10/31/19 1948     Updated:  10/31/19 1950    Narrative:       CT Head WO Code Stroke    HISTORY:   Acute onset of facial droop and slurred speech    TECHNIQUE:   Axial unenhanced head CT. Radiation dose reduction  techniques included automated exposure control or exposure modulation based on body size. Count of known CT and cardiac nuc med studies performed in previous 12 months: 1.     Time of scan: 7:36 PM    COMPARISON:   No pertinent prior study    FINDINGS:   The ventricles are mildly generous in size. Cortical sulci are correspondingly prominent. No extraaxial fluid collections are seen.    No parenchymal or subarachnoid hemorrhage is present. Prominent periventricular hypodensities are demonstrated which are nonspecific but likely represent white matter microvascular change. No CT evidence of mass or acute infarct.    The mastoid air cells are clear. The visualized paranasal sinuses demonstrate inflammatory change in the left side of the sphenoid sinus and the ethmoid air cells..  Orbital structures demonstrate no acute findings.      Impression:       Impression:  1. No clearly acute intracranial pathology demonstrated.  2. Mild cerebral atrophy and prominent periventricular hypodensities which are thought to represent white matter microvascular change.  3. Report called to Lobito Martinez at 7:43 PM, Thursday, October 31, 2019.    Signer Name: Souleymane Clements MD   Signed: 10/31/2019 7:48 PM   Workstation Name: [id number]    [facility]               I have reviewed the medications:  Scheduled Meds:  amLODIPine 5 mg Oral Q24H   aspirin 81 mg Oral Daily   atorvastatin 80 mg Oral Nightly   gabapentin 400 mg Oral TID   hydrochlorothiazide 12.5 mg Oral Daily   insulin detemir 10 Units Subcutaneous Nightly   insulin lispro 0-7 Units Subcutaneous 4x Daily With Meals & Nightly   insulin NPH 10 Units Subcutaneous Once   lisinopril 40 mg Oral Daily   LORazepam 1 mg Oral Once   pantoprazole 40 mg Oral Daily   sodium chloride 10 mL Intravenous Q12H     Continuous Infusions:  niCARdipine 5-15 mg/hr Last Rate: Stopped (11/01/19 0017)     PRN Meds:.•  acetaminophen  •  dextrose  •  dextrose  •  glucagon  (human recombinant)  •  influenza vaccine  •  sodium chloride      Assessment/Plan   Assessment / Plan     Active Hospital Problems    Diagnosis  POA   • **TIA (transient ischemic attack) [G45.9]  Yes   • Type 2 diabetes mellitus (CMS/HCC) [E11.9]  Yes   • Hyperlipidemia [E78.5]  Yes   • Hypertension [I10]  Yes      Resolved Hospital Problems   No resolved problems to display.        Brief Hospital Course to date:  Thelma Michael is a 58 y.o. female here with transient facial droop/dysarthria/TIA    TIA  --asa/statin  --awaiting MRI/ECHO  --Neurology consulted    HTN  --possibly uncontrolled  --titrate medications    DM  --basal/bolus insulin, A1C pending    HL    DVT Prophylaxis:  SCDS    Disposition: I expect the patient to be discharged TBD.    CODE STATUS:   Code Status and Medical Interventions:   Ordered at: 10/31/19 0523     Level Of Support Discussed With:    Patient     Code Status:    CPR     Medical Interventions (Level of Support Prior to Arrest):    Full         Electronically signed by Krishna Jean MD, 11/01/19, 8:03 AM.

## 2019-11-01 NOTE — PLAN OF CARE
Problem: Patient Care Overview  Goal: Plan of Care Review  Outcome: Ongoing (interventions implemented as appropriate)   11/01/19 3231   OTHER   Outcome Summary more alert after ativan worn off post MRI vitals WNL achs with cooverage denies pain gave  ml bolus vitals WNL    Coping/Psychosocial   Plan of Care Reviewed With patient   Plan of Care Review   Progress improving

## 2019-11-01 NOTE — PLAN OF CARE
Problem: Stroke (Ischemic) (Adult)  Goal: Signs and Symptoms of Listed Potential Problems Will be Absent, Minimized or Managed (Stroke)  Outcome: Ongoing (interventions implemented as appropriate)   11/01/19 1631   Goal/Outcome Evaluation   Problems Assessed (Stroke (Ischemic)) all   Problems Assessed (Stroke (Ischemic)) none

## 2019-11-01 NOTE — PROGRESS NOTES
Discharge Planning Assessment  [facility]     Patient Name: Thelma Michael  MRN: [id number]  Today's Date: 11/1/2019    Admit Date: 10/31/2019    Discharge Needs Assessment     Row Name 11/01/19 1543       Living Environment    Lives With  child(bronson), adult    Name(s) of Who Lives With Patient  Keshav Soto(son)    Unique Family Situation  Pt's daughter in from [place] to stay with pt    Current Living Arrangements  home/apartment/condo    Primary Care Provided by  self    Provides Primary Care For  no one    Family Caregiver if Needed  child(bronson), adult    Quality of Family Relationships  helpful;supportive    Able to Return to Prior Arrangements  yes    Living Arrangement Comments  Spoke with pt in room with permission in regards to discharge plan.  Pt's daughter present.  Pt resides in [facility] with son.  Pt's daughter in from [place] and will stay with her for awhile.        Resource/Environmental Concerns    Resource/Environmental Concerns  financial Pt has no insurance.  Message left with [facility] to see pt.     Financial Concerns  insurance, none    Home Accessibility Concerns  stairs to enter home 3 stairs to enter home       Transition Planning    Patient/Family Anticipates Transition to  home with family    Patient/Family Anticipated Services at Transition      Transportation Anticipated  car, drives self;family or friend will provide       Discharge Needs Assessment    Readmission Within the Last 30 Days  no previous admission in last 30 days    Concerns to be Addressed  discharge planning    Equipment Currently Used at Home  glucometer    Equipment Needed After Discharge  glucometer    Discharge Coordination/Progress  Pt has no insurance.  CM called [facility] to see and screen.  Pt's son lives with her and her daughter is in from [place] and plans to stay with her for awhile. Plan is home.  Pt denies any other discharge needs. CM will cont to follow        Discharge Plan     Row Name 11/01/19 1543        Plan    Plan  discharge plan    Patient/Family in Agreement with Plan  yes    Plan Comments  Plan is home and adult children will help her as needed.  [facility] to see/screen due to no insurance. Pt denies any other discharge needs. CM will cont to follow.    Final Discharge Disposition Code  01 - home or self-care        Destination      No service coordination in this encounter.      Durable Medical Equipment      No service coordination in this encounter.      Dialysis/Infusion      No service coordination in this encounter.      Home Medical Care      No service coordination in this encounter.      Therapy      No service coordination in this encounter.      Community Resources      No service coordination in this encounter.          Demographic Summary     Row Name 11/01/19 1545       General Information    General Information Comments  Pt has no insurance and currently sees someone at [facility]       Contact Information    Permission Granted to Share Info With      Contact Information Obtained for      Contact Information Comments  Keshav Soto(son):  [phone]        Functional Status     Row Name 11/01/19 1546       Functional Status    Functional Status Comments  Pt is independent of ADLs        Psychosocial    No documentation.       Abuse/Neglect    No documentation.       Legal    No documentation.       Substance Abuse    No documentation.       Patient Forms    No documentation.           Nicky Baltazar RN

## 2019-11-01 NOTE — THERAPY EVALUATION
Patient Name: Thelma Michael  : 1960    MRN: [id number]                              Today's Date: 2019       Admit Date: 10/31/2019    Visit Dx:     ICD-10-CM ICD-9-CM   1. TIA (transient ischemic attack) G45.9 435.9   2. Type 2 diabetes mellitus with hyperglycemia, unspecified whether long term insulin use (CMS/HCC) E11.65 250.00   3. Elevated blood pressure reading with diagnosis of hypertension I10 401.9   4. Hyperlipidemia, unspecified hyperlipidemia type E78.5 272.4   5. 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 LVEF 65%.ii. Mild LVH.iii. Borderline evidence of atrial septal aneurysm.  No PFO.    • History of nuclear stress test 2014    Negative for ischemia and scars; LVEF 77%.     • Hyperlipidemia    • Hypertension    • Impacted cerumen of both ears    • Migraine    • Sinusitis    • Tobacco abuse     quit 4 days ago.     • Urticaria      Past Surgical History:   Procedure Laterality Date   • DENTAL PROCEDURE     • NO PAST SURGERIES       General Information     Row Name 19 1401          PT Evaluation Time/Intention    Document Type  evaluation  -LM     Mode of Treatment  individual therapy;physical therapy  -LM     Row Name 19 1401          General Information    Patient  Profile Reviewed?  yes  -LM     Prior Level of Function  independent:;all household mobility;gait;ADL's;home management  -LM     Existing Precautions/Restrictions  fall  -LM     Barriers to Rehab  none identified  -LM     Row Name 11/01/19 1401          Relationship/Environment    Lives With  child(bronson), adult Son; Daughter has also been staying there for the last 2 months  -LM     Row Name 11/01/19 1401          Resource/Environmental Concerns    Current Living Arrangements  home/apartment/condo  -LM     Row Name 11/01/19 1401          Stairs Within Home, Primary    Stairs, Within Home, Primary  12  -LM     Number of Stairs, Within Home, Primary  other (see comments)  -LM     Row Name 11/01/19 1401          Cognitive Assessment/Intervention- PT/OT    Orientation Status (Cognition)  oriented to;person;place  -LM     Cognitive Assessment/Intervention Comment  Pt very groggy from receiving Ativan.  -LM     Row Name 11/01/19 1401          Safety Issues, Functional Mobility    Safety Issues Affecting Function (Mobility)  safety precaution awareness;safety precautions follow-through/compliance  -LM     Impairments Affecting Function (Mobility)  balance;cognition;strength  -LM       User Key  (r) = Recorded By, (t) = Taken By, (c) = Cosigned By    Initials Name Provider Type    LM Stephanie Cervantes, PT Physical Therapist        Mobility     Row Name 11/01/19 1401          Bed Mobility Assessment/Treatment    Bed Mobility Assessment/Treatment  supine-sit;sit-supine  -LM     Supine-Sit [place] (Bed Mobility)  moderate assist (50% patient effort);2 person assist  -LM     Sit-Supine [place] (Bed Mobility)  moderate assist (50% patient effort);2 person assist  -LM     Assistive Device (Bed Mobility)  bed rails;head of bed elevated  -LM     Row Name 11/01/19 1401          Sit-Stand Transfer    Sit-Stand [place] (Transfers)  moderate assist (50% patient effort);1 person assist  -LM     Row Name 11/01/19 1401           Gait/Stairs Assessment/Training    Gait/Stairs Assessment/Training  gait/ambulation independence  -LM     [place] Level (Gait)  moderate assist (50% patient effort);2 person assist  -LM     Distance in Feet (Gait)  3 feet  -LM     Deviations/Abnormal Patterns (Gait)  miguel decreased;base of support, narrow  -LM     Bilateral Gait Deviations  knee buckling, bilateral  (Significant)   -LM     Comment (Gait/Stairs)  Pt stood at EOB and took ~3 sidesteps towards HOB.  Significant knee buckling occurred.  -LM       User Key  (r) = Recorded By, (t) = Taken By, (c) = Cosigned By    Initials Name Provider Type    LM Stephanie Cervantes, PT Physical Therapist        Obj/Interventions     Row Name 11/01/19 1401          General ROM    GENERAL ROM COMMENTS  BLE AROM WFL with increased time  -LM     Row Name 11/01/19 1401          MMT (Manual Muscle Testing)    General MMT Comments  BLEs grossly 3/5 throughout  -LM     Row Name 11/01/19 1401          Static Sitting Balance    Level of [place] (Unsupported Sitting, Static Balance)  minimal assist, 75% patient effort;1 person assist  -LM     Sitting Position (Unsupported Sitting, Static Balance)  sitting on edge of bed  -LM     Time Able to Maintain Position (Unsupported Sitting, Static Balance)  more than 5 minutes  -LM     Comment (Unsupported Sitting, Static Balance)  Pt very groggy and demonstrating significant posterior lean.  -LM     Row Name 11/01/19 1401          Static Standing Balance    Level of [place] (Supported Standing, Static Balance)  moderate assist, 50 to 74% patient effort;1 person assist  -LM     Row Name 11/01/19 1401          Dynamic Standing Balance    Level of [place], Reaches Outside Midline (Standing, Dynamic Balance)  moderate assist, 50 to 74% patient effort;2 person assist  -LM       User Key  (r) = Recorded By, (t) = Taken By, (c) = Cosigned By    Initials Name Provider Type    LM Stephanie Cervantes, PT Physical Therapist         Goals/Plan     Row Name 11/01/19 1401          Bed Mobility Goal 1 (PT)    Activity/Assistive Device (Bed Mobility Goal 1, PT)  sit to supine/supine to sit  -LM     [place] Level/Cues Needed (Bed Mobility Goal 1, PT)  independent  -LM     Time Frame (Bed Mobility Goal 1, PT)  long term goal (LTG);2 weeks  -LM     Row Name 11/01/19 1401          Transfer Goal 1 (PT)    Activity/Assistive Device (Transfer Goal 1, PT)  bed-to-chair/chair-to-bed  -LM     [place] Level/Cues Needed (Transfer Goal 1, PT)  independent  -LM     Time Frame (Transfer Goal 1, PT)  long term goal (LTG);2 weeks  -LM     Row Name 11/01/19 1401          Gait Training Goal 1 (PT)    Activity/Assistive Device (Gait Training Goal 1, PT)  gait (walking locomotion)  -LM     [place] Level (Gait Training Goal 1, PT)  independent  -LM     Distance (Gait Goal 1, PT)  500 feet  -LM     Time Frame (Gait Training Goal 1, PT)  long term goal (LTG);2 weeks  -LM     Row Name 11/01/19 1401          Stairs Goal 1 (PT)    Activity/Assistive Device (Stairs Goal 1, PT)  stairs, all skills  -LM     [place] Level/Cues Needed (Stairs Goal 1, PT)  conditional independence  -LM     Number of Stairs (Stairs Goal 1, PT)  12  -LM     Time Frame (Stairs Goal 1, PT)  long term goal (LTG);2 weeks  -LM       User Key  (r) = Recorded By, (t) = Taken By, (c) = Cosigned By    Initials Name Provider Type    LM Stephanie Cervantes, PT Physical Therapist        Clinical Impression     Row Name 11/01/19 1401          Pain Assessment    Additional Documentation  Pain Scale: Numbers Pre/Post-Treatment (Group)  -LM     Row Name 11/01/19 1401          Pain Scale: Numbers Pre/Post-Treatment    Pain Scale: Numbers, Pretreatment  0/10 - no pain  -LM     Pain Scale: Numbers, Post-Treatment  0/10 - no pain  -LM     Row Name 11/01/19 1401          Plan of Care Review    Plan of Care Reviewed With  patient;daughter  -LM     Row Name 11/01/19 1401          Physical Therapy  Clinical Impression    Criteria for Skilled Interventions Met (PT Clinical Impression)  yes;treatment indicated  -LM     Rehab Potential (PT Clinical Summary)  good, to achieve stated therapy goals  -LM     Row Name 11/01/19 1401          Vital Signs    Pre Systolic BP Rehab  -- VSS - RN cleared for tx  -LM     Pre Patient Position  Supine  -LM     Intra Patient Position  Standing  -LM     Post Patient Position  Supine  -LM     Row Name 11/01/19 1401          Positioning and Restraints    Pre-Treatment Position  in bed  -LM     Post Treatment Position  bed  -LM     In Bed  supine;call light within reach;encouraged to call for assist;with family/caregiver;notified nsg  -LM       User Key  (r) = Recorded By, (t) = Taken By, (c) = Cosigned By    Initials Name Provider Type    Stephanie Sanford PT Physical Therapist        Outcome Measures     Row Name 11/01/19 1401          How much help from another person do you currently need...    Turning from your back to your side while in flat bed without using bedrails?  2  -LM     Moving from lying on back to sitting on the side of a flat bed without bedrails?  2  -LM     Moving to and from a bed to a chair (including a wheelchair)?  2  -LM     Standing up from a chair using your arms (e.g., wheelchair, bedside chair)?  2  -LM     Climbing 3-5 steps with a railing?  1  -LM     To walk in hospital room?  2  -LM     AM-PAC 6 Clicks Score (PT)  11  -LM     Row Name 11/01/19 1401          Functional Assessment    Outcome Measure Options  AM-PAC 6 Clicks Basic Mobility (PT)  -LM       User Key  (r) = Recorded By, (t) = Taken By, (c) = Cosigned By    Initials Name Provider Type    Stephanie Sanford PT Physical Therapist        Physical Therapy Education     Title: PT OT SLP Therapies (In Progress)     Topic: Physical Therapy (In Progress)     Point: Mobility training (In Progress)     Learning Progress Summary           Patient Acceptance, E, NR by RABIA at 11/1/2019  2:38 PM                    Point: Precautions (In Progress)     Learning Progress Summary           Patient Acceptance, E, NR by  at 11/1/2019  2:38 PM                               User Key     Initials Effective Dates Name Provider Type Discipline     07/24/19 -  Stephanie Cervantes, [facility] Physical Therapist PT              PT Recommendation and Plan  Planned Therapy Interventions (PT Eval): balance training, bed mobility training, gait training, home exercise program, motor coordination training, neuromuscular re-education, patient/family education, postural re-education, ROM (range of motion), stair training, strengthening, stretching  Outcome Summary/Treatment Plan (PT)  Anticipated Discharge Disposition (PT): inpatient rehabilitation facility  Plan of Care Reviewed With: patient, daughter  Outcome Summary: PT evaluation completed on this date.  Pt sleeping upon entering and very groggy from medicine once awake.  Pt transferred supine-->sit with ModAx1, stood with ModAx1, and took ~2-3 sidesteps with ModAx2 - significant B knee buckling noted.  Based off today, recommend inpt rehab.  However, if pt is truly groggy from meds - may be okay to d/c home with family.  Will have to continue to assess next session.     Time Calculation:   PT Charges     Row Name 11/01/19 1401             Time Calculation    Start Time  1401  -LM      PT Received On  11/01/19  -      PT Goal Re-Cert Due Date  11/11/19  -        User Key  (r) = Recorded By, (t) = Taken By, (c) = Cosigned By    Initials Name Provider Type     Stephanie Cervantes PT Physical Therapist        Therapy Charges for Today     Code Description Service Date Service Provider Modifiers Qty    [id number]  PT EVAL MOD COMPLEXITY 4 11/1/2019 Stephanie Cervantes, PT GP 1          PT G-Codes  Outcome Measure Options: AM-PAC 6 Clicks Basic Mobility (PT)  AM-PAC 6 Clicks Score (PT): 11  AM-PAC 6 Clicks Score (OT): 23    Stephanie Cervantes PT  11/1/2019

## 2019-11-01 NOTE — PLAN OF CARE
Problem: Patient Care Overview  Goal: Plan of Care Review  Outcome: Ongoing (interventions implemented as appropriate)   11/01/19 1401   OTHER   Outcome Summary PT evaluation completed on this date. Pt sleeping upon entering and very groggy from medicine once awake. Pt transferred supine-->sit with ModAx1, stood with ModAx1, and took ~2-3 sidesteps with ModAx2 - significant B knee buckling noted. Based off today, recommend inpt rehab. However, if pt is truly groggy from meds - may be okay to d/c home with family. Will have to continue to assess next session.   Coping/Psychosocial   Plan of Care Reviewed With patient;daughter

## 2019-11-01 NOTE — THERAPY DISCHARGE NOTE
Acute Care - Occupational Therapy Initial Eval/Discharge  [facility]     Patient Name: Thelma Michael  : 1960  MRN: [id number]  Today's Date: 2019  Onset of Illness/Injury or Date of Surgery: 10/31/19  Date of Referral to OT: 10/31/19  Referring Physician: Marcela      Admit Date: 10/31/2019       ICD-10-CM ICD-9-CM   1. TIA (transient ischemic attack) G45.9 435.9   2. Type 2 diabetes mellitus with hyperglycemia, unspecified whether long term insulin use (CMS/[facility]) E11.65 250.00   3. Elevated blood pressure reading with diagnosis of hypertension I10 401.9   4. Hyperlipidemia, unspecified hyperlipidemia type E78.5 272.4   5. 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, controlled, managed as type 2 (CMS/[facility])   • Diabetic hypoglycemia (CMS/[facility])   • Osteoporosis   • Tobacco use   • History of migraine   • Personal history of cardiac murmur   • Pelvic pain   • History of sepsis   • TIA (transient ischemic attack)   • Type 2 diabetes mellitus (CMS/[facility])   • Migraines     Past Medical History:   Diagnosis Date   • Acid reflux    • Acute bronchitis    • Cardiac murmur    • Diabetes mellitus (CMS/[facility])    • H/O echocardiogram 2012    i. LVEF 65%.ii. Mild LVH.iii. Borderline evidence of atrial septal aneurysm.  No PFO.    • History of nuclear stress test 2014    Negative for ischemia and scars; LVEF 77%.     • Hyperlipidemia    • Hypertension    • Impacted cerumen of both ears    • Migraine    • Sinusitis    • Tobacco abuse     quit 4 days ago.     • Urticaria      Past Surgical History:   Procedure Laterality Date   • DENTAL PROCEDURE     • NO PAST SURGERIES            OT ASSESSMENT FLOWSHEET (last 12 hours)      Occupational Therapy Evaluation     Row Name 19  0745                   OT Evaluation Time/Intention    Subjective Information  no complaints  -TB        Document Type  evaluation;discharge evaluation/summary  -TB        Mode of Treatment  occupational therapy;individual therapy  -TB        Patient Effort  good  -TB        Symptoms Noted During/After Treatment  none  -TB        Comment  Pt at baseline for mobility and self-care  -TB           General Information    Patient Profile Reviewed?  yes  -TB        Onset of Illness/Injury or Date of Surgery  10/31/19  -TB        Referring Physician  Opii  -TB        Patient Observations  alert;cooperative;agree to therapy  -TB        Patient/Family Observations  Daughter present  -TB        General Observations of Patient  Pt in bed, RA, IV heplocked, tele  -TB        Prior Level of Function  independent:;all household mobility;transfer;ADL's;home management  -TB        Equipment Currently Used at Home  glucometer;shower chair  -TB        Pertinent History of Current Functional Problem  Pt to ED with c/o recurrent episodes of slurred speech and facial droop. CT imaging (-) for acute findings. MRI results pending.  -TB        Existing Precautions/Restrictions  no known precautions/restrictions  -TB        Risks Reviewed  patient and family:;LOB;increased discomfort;change in vital signs;lines disloged  -TB        Benefits Reviewed  patient and family:;improve function;decrease pain;increase knowledge  -TB        Barriers to Rehab  none identified  -TB           Relationship/Environment    Primary Source of Support/Comfort  child(bronson)  -TB        Lives With  child(bronson), adult  -TB        Name(s) of Who Lives With Patient  Son, works days  -TB        Family Caregiver if Needed  child(bronson), adult  -TB        Family Caregiver Names  Daughter in town from [place] for extended stay  -TB           Resource/Environmental Concerns    Current Living Arrangements  home/apartment/condo  -TB           Cognitive Assessment/Intervention-  PT/OT    Orientation Status (Cognition)  oriented x 4  -TB        Follows Commands (Cognition)  WFL  -TB           Safety Issues, Functional Mobility    Comment, Safety Issues/Impairments (Mobility)  good safety  -TB           Bed Mobility Assessment/Treatment    Bed Mobility Assessment/Treatment  bed mobility (all) activities  -TB        Reeves Level (Bed Mobility)  independent  -TB           Functional Mobility    Functional Mobility- Ind. Level  supervision required  -TB        Functional Mobility-Distance (Feet)  100  -TB        Functional Mobility- Comment  No AD, no LOB  -TB           Transfer Assessment/Treatment    Transfer Assessment/Treatment  sit-stand transfer;stand-sit transfer;toilet transfer;bed-chair transfer  -TB           Bed-Chair Transfer    Bed-Chair Reeves (Transfers)  supervision  -TB           Sit-Stand Transfer    Sit-Stand Reeves (Transfers)  supervision  -TB           Stand-Sit Transfer    Stand-Sit Reeves (Transfers)  supervision  -TB           Toilet Transfer    Type (Toilet Transfer)  sit-stand;stand-sit  -TB        Reeves Level (Toilet Transfer)  independent  -TB        Assistive Device (Toilet Transfer)  raised toilet seat  -TB           ADL Assessment/Intervention    BADL Assessment/Intervention  lower body dressing;grooming;feeding;toileting  -TB           Lower Body Dressing Assessment/Training    Lower Body Dressing Reeves Level  doff;don;socks;independent  -TB        Lower Body Dressing Position  edge of bed sitting  -TB           Grooming Assessment/Training    Reeves Level (Grooming)  independent wash/dry hands  -TB        Grooming Position  sink side  -TB           Self-Feeding Assessment/Training    Reeves Level (Feeding)  feeding skills;independent  -TB        Position (Self-Feeding)  supported sitting  -TB           Toileting Assessment/Training    Reeves Level (Toileting)  adjust/manage clothing;perform perineal  hygiene;independent  -TB        Assistive Devices (Toileting)  raised toilet seat  -TB           General ROM    GENERAL ROM COMMENTS  AROM BUE intact  -TB           MMT (Manual Muscle Testing)    General MMT Comments  BUE intact; equal  -TB           Motor Assessment/Interventions    Additional Documentation  Balance (Group);Therapeutic Exercise (Group)  -TB           Therapeutic Exercise    Therapeutic Exercise  seated, upper extremities  -TB        Additional Documentation  Therapeutic Exercise (Row)  -TB           Upper Extremity Seated Therapeutic Exercise    Performed, Seated Upper Extremity (Therapeutic Exercise)  shoulder flexion/extension;shoulder abduction/adduction;shoulder external/internal rotation;shoulder horizontal abduction/adduction;elbow flexion/extension;forearm supination/pronation;wrist flexion/extension;digit flexion/extension  -TB        Exercise Type, Seated Upper Extremity (Therapeutic Exercise)  AROM (active range of motion)  -TB        Restrictions, Seated Upper Extremity (Therapeutic Exercise)  None  -TB           Balance    Balance  dynamic sitting balance;dynamic standing balance  -TB           Dynamic Sitting Balance    Level of Ross, Reaches Outside Midline (Sitting, Dynamic Balance)  independent  -TB        Sitting Position, Reaches Outside Midline (Sitting, Dynamic Balance)  sitting on edge of bed;sitting in chair commode  -TB        Comment, Reaches Outside Midline (Sitting, Dynamic Balance)  ADL tasks  -TB           Dynamic Standing Balance    Level of Ross, Reaches Outside Midline (Standing, Dynamic Balance)  supervision  -TB        Time Able to Maintain Position, Reaches Outside Midline (Standing, Dynamic Balance)  more than 5 minutes  -TB        Comment, Reaches Outside Midline (Standing, Dynamic Balance)  higher level balance assessment - pt able to walk backwards, take side steps to R/L, change directions and retrieve item from floor without difficulty; no  LOB, no AD  -TB           Sensory Assessment/Intervention    Sensory General Assessment  no sensation deficits identified BUE intact; reports B foot peripheral neuropathy  -TB           Positioning and Restraints    Pre-Treatment Position  in bed  -TB        Post Treatment Position  bathroom  -TB        Bathroom  with family/caregiver  -TB           Pain Assessment    Additional Documentation  Pain Scale: Numbers Pre/Post-Treatment (Group)  -TB           Pain Scale: Numbers Pre/Post-Treatment    Pain Scale: Numbers, Pretreatment  0/10 - no pain  -TB        Pain Scale: Numbers, Post-Treatment  0/10 - no pain  -TB        Pain Intervention(s)  Ambulation/increased activity;Repositioned  -TB           Coping    Observed Emotional State  calm;cooperative  -TB        Verbalized Emotional State  hopefulness  -TB           Plan of Care Review    Plan of Care Reviewed With  patient;daughter  -TB           Clinical Impression (OT)    Date of Referral to OT  10/31/19  -TB        OT Diagnosis  Impaired mobility and ADL  -TB        Criteria for Skilled Therapeutic Interventions Met (OT Eval)  no;treatment indicated  -TB        Therapy Frequency (OT Eval)  evaluation only  -TB        Care Plan Review (OT)  evaluation/treatment results reviewed;patient/other agree to care plan  -TB        Care Plan Review, Other Participant (OT Eval)  daughter  -TB        Anticipated Equipment Needs at Discharge (OT)  -- None  -TB        Anticipated Discharge Disposition (OT)  home with assist  -TB           Vital Signs    Pre Systolic BP Rehab  -- RN cleared OT  -TB        O2 Delivery Post Treatment  room air  -TB        Pre Patient Position  Supine  -TB        Intra Patient Position  Standing  -TB        Post Patient Position  Sitting  -TB           Discharge Summary (Occupational Therapy)    Additional Documentation  Discharge Summary, OT Eval (Group)  -TB           Discharge Summary, OT Eval    Reason for Discharge (OT Discharge Summary)  no  further needs identified  -TB           Living Environment    Home Accessibility  stairs to enter home walk-in shower with seat  -          User Key  (r) = Recorded By, (t) = Taken By, (c) = Cosigned By    Initials Name Effective Dates     Rashmi Timmons OT 06/08/18 -           Occupational Therapy Education     Title: PT OT SLP Therapies (In Progress)     Topic: Occupational Therapy (Done)     Point: ADL training (Done)     Description: Instruct learner(s) on proper safety adaptation and remediation techniques during self care or transfers.   Instruct in proper use of assistive devices.    Learning Progress Summary           Patient Acceptance, KIKA UREÑA DU by  at 11/1/2019  8:35 AM    Comment:  Education completed for benefits of activity/therapy, role of OT and home safety/fall prevention   Family AcceptanceADELITA VU, DU by  at 11/1/2019  8:35 AM    Comment:  Education completed for benefits of activity/therapy, role of OT and home safety/fall prevention                               User Key     Initials Effective Dates Name Provider Type Discipline     06/08/18 -  Rashmi Timmons OT Occupational Therapist OT                OT Recommendation and Plan  Outcome Summary/Treatment Plan (OT)  Anticipated Equipment Needs at Discharge (OT): (None)  Anticipated Discharge Disposition (OT): home with assist  Reason for Discharge (OT Discharge Summary): no further needs identified  Therapy Frequency (OT Eval): evaluation only  Plan of Care Review  Plan of Care Reviewed With: patient, daughter  Plan of Care Reviewed With: patient, daughter  Outcome Summary: OT IE completed. No POC established as pt is independent for mobility and ADLs. Education completed for home safety. OT to d/c at this time. Recommend home with family support.          Outcome Measures     Row Name 11/01/19 0745             How much help from another is currently needed...    Putting on and taking off regular lower body clothing?  4   -TB      Bathing (including washing, rinsing, and drying)  3  -TB      Toileting (which includes using toilet bed pan or urinal)  4  -TB      Putting on and taking off regular upper body clothing  4  -TB      Taking care of personal grooming (such as brushing teeth)  4  -TB      Eating meals  4  -TB      AM-PAC 6 Clicks Score (OT)  23  -TB         Functional Assessment    Outcome Measure Options  AM-PAC 6 Clicks Daily Activity (OT)  -TB        User Key  (r) = Recorded By, (t) = Taken By, (c) = Cosigned By    Initials Name Provider Type    TB Rashmi Timmons OT Occupational Therapist          Time Calculation:   Time Calculation- OT     Row Name 11/01/19 0837             Time Calculation- OT    OT Start Time  0745  -TB      OT Received On  11/01/19  -TB        User Key  (r) = Recorded By, (t) = Taken By, (c) = Cosigned By    Initials Name Provider Type    Rashmi Pineda OT Occupational Therapist        Therapy Suggested Charges     Code   Minutes Charges    None           Therapy Charges for Today     Code Description Service Date Service Provider Modifiers Qty    [id number]  OT EVAL MOD COMPLEXITY 4 11/1/2019 Rashmi Timmons OT GO 1               OT Discharge Summary  Anticipated Discharge Disposition (OT): home with assist    Rashmi Timmons OT  11/1/2019

## 2019-11-01 NOTE — CONSULTS
" Discussed and taught patient about type 2 diabetes self-management, risk factors, and importance of blood glucose control to reduce complications. Target blood glucose readings and A1c goals per ADA were reviewed. Reviewed with patient current A1c of 14.1 and discussed its significance. Specifically discussed \"sticky cells\" and risk of CVA and MI. Ms. Michael's last A1c was 9.5 in August.  She states her medication was changed several times over the last few months and she has been working with her PCP to get better control. We discussed checking a fasting blood sugar every day and increasing basal insulin by 2 units every couple of weeks if blood sugars trending above 150.  We discussed calling her PCP if trending above 150 consistently.  We discussed using postprandial glucoses to problem solve meal choices.  Signs, symptoms and treatment of hyperglycemia and hypoglycemia were discussed. Lifestyle changes such as physical activity with MD approval and healthy eating were encouraged. Stressed the importance of strict blood sugar to prevent future CVA.  Instructed to check blood sugar 2-3 times per day minimum.  Ms. Michael has a meter and is comfortable using.  She states she can afford medications and supplies. Ms. Michael was offered a free op follow up appointment however declined at this time. She states she may call to schedule once discharged.  Thank you.  "

## 2019-11-01 NOTE — PLAN OF CARE
Problem: Patient Care Overview  Goal: Plan of Care Review   11/01/19 3334   OTHER   Outcome Summary OT IE completed. No POC established as pt is independent for mobility and ADLs. Education completed for home safety. OT to d/c at this time. Recommend home with family support.    Coping/Psychosocial   Plan of Care Reviewed With patient;daughter

## 2019-11-01 NOTE — SIGNIFICANT NOTE
Unable to complete S[facility] evaluation. Patient off floor on initial attempt for MRI. On second attempt, patient tearful and drowsy following Ativan used during MRI.

## 2019-11-02 LAB
GLUCOSE BLDC GLUCOMTR-MCNC: 290 MG/DL (ref 70–130)
GLUCOSE BLDC GLUCOMTR-MCNC: 299 MG/DL (ref 70–130)
GLUCOSE BLDC GLUCOMTR-MCNC: 317 MG/DL (ref 70–130)
GLUCOSE BLDC GLUCOMTR-MCNC: 346 MG/DL (ref 70–130)
GLUCOSE BLDC GLUCOMTR-MCNC: 364 MG/DL (ref 70–130)

## 2019-11-02 PROCEDURE — G0378 HOSPITAL OBSERVATION PER HR: HCPCS

## 2019-11-02 PROCEDURE — 99226 PR SBSQ OBSERVATION CARE/DAY 35 MINUTES: CPT | Performed by: NURSE PRACTITIONER

## 2019-11-02 PROCEDURE — 63710000001 INSULIN DETEMIR PER 5 UNITS: Performed by: NURSE PRACTITIONER

## 2019-11-02 PROCEDURE — 99244 OFF/OP CNSLTJ NEW/EST MOD 40: CPT | Performed by: INTERNAL MEDICINE

## 2019-11-02 PROCEDURE — 82962 GLUCOSE BLOOD TEST: CPT

## 2019-11-02 PROCEDURE — 92523 SPEECH SOUND LANG COMPREHEN: CPT

## 2019-11-02 PROCEDURE — 99213 OFFICE O/P EST LOW 20 MIN: CPT | Performed by: PSYCHIATRY & NEUROLOGY

## 2019-11-02 RX ORDER — DEXTROSE MONOHYDRATE 25 G/50ML
25 INJECTION, SOLUTION INTRAVENOUS
Status: DISCONTINUED | OUTPATIENT
Start: 2019-11-02 | End: 2019-11-02

## 2019-11-02 RX ORDER — NICOTINE POLACRILEX 4 MG
15 LOZENGE BUCCAL
Status: DISCONTINUED | OUTPATIENT
Start: 2019-11-02 | End: 2019-11-02

## 2019-11-02 RX ADMIN — AMLODIPINE BESYLATE 5 MG: 5 TABLET ORAL at 08:51

## 2019-11-02 RX ADMIN — INSULIN LISPRO 5 UNITS: 100 INJECTION, SOLUTION INTRAVENOUS; SUBCUTANEOUS at 08:52

## 2019-11-02 RX ADMIN — GABAPENTIN 400 MG: 400 CAPSULE ORAL at 21:43

## 2019-11-02 RX ADMIN — HYDROCHLOROTHIAZIDE 12.5 MG: 12.5 TABLET ORAL at 08:50

## 2019-11-02 RX ADMIN — PANTOPRAZOLE SODIUM 40 MG: 40 TABLET, DELAYED RELEASE ORAL at 08:50

## 2019-11-02 RX ADMIN — INSULIN DETEMIR 20 UNITS: 100 INJECTION, SOLUTION SUBCUTANEOUS at 21:44

## 2019-11-02 RX ADMIN — GABAPENTIN 400 MG: 400 CAPSULE ORAL at 08:51

## 2019-11-02 RX ADMIN — ATORVASTATIN CALCIUM 80 MG: 40 TABLET, FILM COATED ORAL at 21:43

## 2019-11-02 RX ADMIN — GABAPENTIN 400 MG: 400 CAPSULE ORAL at 16:13

## 2019-11-02 RX ADMIN — ASPIRIN 81 MG: 81 TABLET, COATED ORAL at 08:50

## 2019-11-02 RX ADMIN — LISINOPRIL 40 MG: 40 TABLET ORAL at 08:51

## 2019-11-02 NOTE — CONSULTS
[facility] AT [address], [address]  [place], [place], [address] [phone]  WWW.[facility], [facility]           INPATIENT CONSULTATION NOTE    Patient Care Team:  Patient Care Team:  Monet Clark APRN as PCP - General (Nurse Practitioner)  Sharita García DO (Inactive) as PCP - Family Medicine    Requesting Provider and Reason for consultation: The patient is being seen today at the request of No ref. provider found for PFO w recent TIA.     Chief complaint:   Chief Complaint   Patient presents with   • Stroke            HPI:    Thelma Michael is a 58 y.o. female with a PMH significant for HTN, HLD, T2DM for 40 years, former tobacco abuse, and migraines who presented to the [facility] ED on 10/31/2019 with complaints of slurred speech and facial droop.  Her symptoms had resolved in the ED.  She reports that she has had ~6 similar episodes over the past several years and most of them last about 10 minutes.  CTA head and neck was unremarkable, CT head unremarkable, CT cerebral perfusion unremarkable, and MRI not consistent with any acute infarct.  Neurology evaluated and felt that some of her prior episodes could be atypical migraines, but this episode was consistent with TIA.  It was recommended that she be on baby ASA and high intensity statin and echocardiogram was ordered. Echo completed on 11/1/2019 revealed an intra-atrial septal aneurysm present with positive saline test for right to left atrial level shunt, and was otherwise wnl for her age.  Cardiology was consulted for further evaluation.    She had previously followed with Dr. Damon from our clinic in 2014 in 2015, and had a normal exercise Cardiolite stress test in 2012 and echo documented as well in 2012 with borderline evidence of atrial septal aneurysm but no PFO.  She then had a another normal exercise Cardiolite test in 2014.    She has a long-standing history of diabetes that was diagnosed in  her 20s and this has been difficult to control.  Her A1c was 14.1.  She is currently not have insurance but would like to see an endocrinologist when she gets insurance again.  She was previously on pravastatin 20mg and cholesterol panel revealed , , HDL 58, . She is apparently recently stopped taking a full-strength aspirin because PCP recommended 81 mg ASA, but she had not obtained a bottle of 81 mg ASA at. She has been a longtime smoker with multiple attempts at cessation where she will be successful for a few weeks to months and then start smoking again.  At present she is smoking less than half a pack a day. BP had been elevated at home 150s to 180s over 80s to 90s, as well controlled at present.  She states she has had nonspecific chest discomfort may be 3-4 times over the last several years but nothing consistent.  No recent chest symptoms.  She denies dyspnea, palpitations, snoring, lower extremity edema.  She does have peripheral neuropathy.       Review of Systems:  Positive for headache, lower extremity paresthesias.  All other systems reviewed are negative.    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      Current Outpatient Medications on File Prior to Encounter   Medication Sig Dispense Refill   • albuterol sulfate  (90 Base) MCG/ACT inhaler Inhale 2 puffs Every 4 (Four) Hours As Needed for Wheezing. 18 g 5    • Ascorbic Acid (VITAMIN C PO) Vitamin C TABS     • aspirin 81 MG tablet Take 1 tablet by mouth Daily. 30 tablet 0   • b complex vitamins capsule Take 1 capsule by mouth Daily. 30 capsule 11   • BD PEN NEEDLE TOOTIE U/F 32G X 4 MM misc USE ONE NEEDLE WITH INSULIN 4 TIMES DAILY 100 each 1   • Blood Glucose Monitoring Suppl (FREESTYLE FREEDOM LITE) w/Device kit 1 kit 3 (Three) Times a Day. TEST; For: Diabetic hypoglycemia, Diabetic peripheral neuropathy 1 each 0   • calcium carbonate (CALCIUM 600) 600 MG tablet Take 1 tablet by mouth Daily. 30 tablet 11   • carbamide peroxide (DEBROX) 6.5 % otic solution Administer 5 drops into both ears 2 (Two) Times a Day. Use as directed; For: Impacted cerumen of both ears 14.8 mL 5   • Cyanocobalamin (VITAMIN B12 PO) Vitamin B12 TABS     • dicyclomine (BENTYL) 20 MG tablet Take 1 tablet by mouth Every 6 (Six) Hours As Needed (diarrhea). 60 tablet 1   • gabapentin (NEURONTIN) 400 MG capsule Take 1 capsule by mouth 3 (Three) Times a Day. 90 capsule 2   • glipiZIDE (GLUCOTROL) 5 MG ER tablet Take 1 tablet by mouth Daily. as directed; For: Diabetic peripheral neuropathy 30 tablet 11   • glucagon (GLUCAGON EMERGENCY) 1 MG injection Use as directed; For: Diabetics mellitus     • Glucose Blood (BLOOD GLUCOSE TEST) strip 1 each by In Vitro route 3 (Three) Times a Day. One touch strips 100 each 11   • glucose monitor monitoring kit 1 each 3 (Three) Times a Day. One touch glucometer 1 each 0   • hydrochlorothiazide (MICROZIDE) 12.5 MG capsule Take 1 capsule by mouth Daily. 30 capsule 6   • Insulin Glargine (LANTUS SOLOSTAR) 100 UNIT/ML injection pen Inject 16 Units under the skin into the appropriate area as directed Every Night. 3 mL 5   • lidocaine-prilocaine (EMLA) 2.5-2.5 % cream      • lisinopril (PRINIVIL,ZESTRIL) 30 MG tablet Take 1 tablet by mouth Daily. 30 tablet 1   • ONE TOUCH LANCETS misc 1 each 3 (Three) Times a Day. 200 each 2   • pantoprazole (PROTONIX) 40 MG EC tablet Take 1  tablet by mouth Daily. 30 tablet 5   • pravastatin (PRAVACHOL) 20 MG tablet Take 1 tablet by mouth Daily. 30 tablet 5     No Known Allergies    Social History     Socioeconomic History   • Marital status:      Spouse name: Not on file   • Number of children: Not on file   • Years of education: Not on file   • Highest education level: Not on file   Tobacco Use   • Smoking status: Former Smoker     Last attempt to quit: 2019     Years since quittin.4   • Smokeless tobacco: Never Used   • Tobacco comment: BC PL never smoker    Substance and Sexual Activity   • Alcohol use: Yes     Alcohol/week: 0.6 oz     Types: 1 Glasses of wine per week     Comment: ocassional   • Drug use: No   • Sexual activity: Defer     Comment: Single      Family History   Problem Relation Age of Onset   • Anxiety disorder Other    • Arthritis Other    • ADD / ADHD Other    • Heart disease Other         cardiac disorder   • Depression Other    • Diabetes Other    • Hyperlipidemia Other    • Hypertension Other    • Lung cancer Other    • Osteoporosis Other             Objective:     Vital Sign Min/Max for last 24 hours  Temp  Min: 97.7 °F (36.5 °C)  Max: 98.2 °F (36.8 °C)   BP  Min: 125/89  Max: 165/89   Pulse  Min: 63  Max: 92   Resp  Min: 16  Max: 16   SpO2  Min: 96 %  Max: 96 %   No Data Recorded      Intake/Output Summary (Last 24 hours) at 2019 1310  Last data filed at 2019 1500  Gross per 24 hour   Intake 500 ml   Output --   Net 500 ml           Vitals:    19 0841   BP: 125/89   Pulse: 82   Resp: 16   Temp: 97.7 °F (36.5 °C)   SpO2:        CONSTITUTIONAL: Thin.  Well-nourished. In no acute distress.   SKIN: Warm and dry. No rashes noted  HEENT: Head is normocephalic and atraumatic. Mucous membranes are pink and moist.   NECK: Supple without masses or thyromegaly.   LUNGS: Normal effort. Clear to auscultation bilaterally without wheezing, rhonchi, or rales noted.   CARDIOVASCULAR: The heart has a regular  rate and rhythm with a normal S1 and S2. Soft murmur heard at apex. There is no , gallop, rub, or click appreciated.   PERIPHERAL VASCULAR: Carotid upstroke is 2+ bilaterally and without bruits. Radial pulses are 2+ bilaterally. L tibial pulses 2+ and R PT 1+. There is no lower extremity edema.   ABDOMEN: Normal bowel sounds.  Soft with no tenderness with palpitation. No hepatosplenomegaly  MUSCULOSKELETAL:  No digital cyanosis  NEUROLOGICAL: Nonfocal.  PSYCHIATRIC: Alert, orientated x 3, appropriate affect     Labs:  Lab Results   Component Value Date    TROPONINI <0.01 08/17/2014    TROPONINT <0.010 10/31/2019       Glucose   Date Value Ref Range Status   10/31/2019 317 (H) 65 - 99 mg/dL Final     BUN   Date Value Ref Range Status   10/31/2019 15 8 - 23 mg/dL Final     Creatinine   Date Value Ref Range Status   10/31/2019 0.89 0.57 - 1.00 mg/dL Final   10/31/2019 0.90 0.60 - 1.30 mg/dL Final     Sodium   Date Value Ref Range Status   10/31/2019 136 136 - 145 mmol/L Final     Potassium   Date Value Ref Range Status   10/31/2019 3.4 (L) 3.5 - 5.2 mmol/L Final     Chloride   Date Value Ref Range Status   10/31/2019 96 (L) 98 - 107 mmol/L Final     CO2   Date Value Ref Range Status   10/31/2019 27.0 22.0 - 29.0 mmol/L Final     Calcium   Date Value Ref Range Status   10/31/2019 9.6 8.6 - 10.5 mg/dL Final     Total Protein   Date Value Ref Range Status   10/31/2019 7.8 6.0 - 8.5 g/dL Final     Albumin   Date Value Ref Range Status   10/31/2019 4.30 3.50 - 5.20 g/dL Final     ALT (SGPT)   Date Value Ref Range Status   10/31/2019 18 1 - 33 U/L Final     AST (SGOT)   Date Value Ref Range Status   10/31/2019 16 1 - 32 U/L Final     Alkaline Phosphatase   Date Value Ref Range Status   10/31/2019 117 39 - 117 U/L Final     Total Bilirubin   Date Value Ref Range Status   10/31/2019 0.4 0.2 - 1.2 mg/dL Final     BUN/Creatinine Ratio   Date Value Ref Range Status   10/31/2019 16.9 7.0 - 25.0 Final     Anion Gap   Date Value Ref  Range Status   10/31/2019 13.0 5.0 - 15.0 mmol/L Final       Lab Results   Component Value Date    CHOL 219 (H) 11/01/2019     Lab Results   Component Value Date    TRIG 211 (H) 11/01/2019     Lab Results   Component Value Date    HDL 58 11/01/2019     Lab Results   Component Value Date     (H) 11/01/2019     No components found for: LDLDIRECTC      WBC   Date Value Ref Range Status   10/31/2019 6.95 3.40 - 10.80 10*3/mm3 Final     RBC   Date Value Ref Range Status   10/31/2019 5.14 3.77 - 5.28 10*6/mm3 Final     Hemoglobin   Date Value Ref Range Status   10/31/2019 15.3 12.0 - 15.9 g/dL Final     Hematocrit   Date Value Ref Range Status   10/31/2019 45.4 34.0 - 46.6 % Final     MCV   Date Value Ref Range Status   10/31/2019 88.3 79.0 - 97.0 fL Final     MCH   Date Value Ref Range Status   10/31/2019 29.8 26.6 - 33.0 pg Final     MCHC   Date Value Ref Range Status   10/31/2019 33.7 31.5 - 35.7 g/dL Final     RDW   Date Value Ref Range Status   10/31/2019 12.0 (L) 12.3 - 15.4 % Final     RDW-SD   Date Value Ref Range Status   10/31/2019 39.3 37.0 - 54.0 fl Final     MPV   Date Value Ref Range Status   10/31/2019 10.9 6.0 - 12.0 fL Final     Platelets   Date Value Ref Range Status   10/31/2019 298 140 - 450 10*3/mm3 Final     Neutrophil %   Date Value Ref Range Status   10/31/2019 49.9 42.7 - 76.0 % Final     Lymphocyte %   Date Value Ref Range Status   10/31/2019 41.9 19.6 - 45.3 % Final     Monocyte %   Date Value Ref Range Status   10/31/2019 6.8 5.0 - 12.0 % Final     Eosinophil %   Date Value Ref Range Status   10/31/2019 0.9 0.3 - 6.2 % Final     Basophil %   Date Value Ref Range Status   10/31/2019 0.4 0.0 - 1.5 % Final     Immature Grans %   Date Value Ref Range Status   10/31/2019 0.1 0.0 - 0.5 % Final     Neutrophils, Absolute   Date Value Ref Range Status   10/31/2019 3.47 1.70 - 7.00 10*3/mm3 Final     Lymphocytes, Absolute   Date Value Ref Range Status   10/31/2019 2.91 0.70 - 3.10 10*3/mm3 Final      Monocytes, Absolute   Date Value Ref Range Status   10/31/2019 0.47 0.10 - 0.90 10*3/mm3 Final     Eosinophils, Absolute   Date Value Ref Range Status   10/31/2019 0.06 0.00 - 0.40 10*3/mm3 Final     Basophils, Absolute   Date Value Ref Range Status   10/31/2019 0.03 0.00 - 0.20 10*3/mm3 Final     Immature Grans, Absolute   Date Value Ref Range Status   10/31/2019 0.01 0.00 - 0.05 10*3/mm3 Final     nRBC   Date Value Ref Range Status   10/31/2019 0.0 0.0 - 0.2 /100 WBC Final         Diagnostic Data:    EKG:  Nsr, 86 bpm, LVH    Tele:  nsr 80 bpm         Assessment and Plan:   ASSESSMENT:  TIA, recurrent, multifocal symptoms  - Not abundantly convincing to be cardioembolic in nature.   - Patient does have a number of risk factors for AFib  - PFO with interatrial septal aneurysm present on echo, but ROPE score is 1, making it unlikely that her TIAs were caused by the PFO    HTN  - controlled at present    HLD  - on prava 20 at home, increased to atorva 80 this admission    T2DM  - poor control, a1c 14  - dietician consulted    PLAN:  -Discussed w pt the prevalence of PFOs, and the many other mechanisms of CVAs/TIAs.   -Dicussed findings with Dr Martin. Agreed that she is not a likely candidate to have had paradoxical emboli from her PFO  -Would still recommend a LIZANDRO to further evaluate for a cardiac source of her TIAs.  Can be performed as an outpatient.  We will also place a 30-day M cot to rule out A. fib at that same time.     -She does not need to stay admitted for this cardiac w/u, will have our office set up these outpatient heart tests     -Counseled pt extensively on risk factor modification, better diabetic control. And smoking cessation.   -Agree w increasing intensity of statin therapy.     Okay for discharge from cardiac standpoint otherwise.   -Follow-up order placed for patient to Francis Mitchell in 8 weeks.       Scribed for Dr. Ludwig Mitchell MD by Ira Russell PA-C. 11/2/2019  1:10 PM     I,  Ludwig Mitchell MD, personally performed the services as scribed by the above named individual. I have made any necessary edits and it is both accurate and complete.     Ludwig Mitchell MD, MSc, [facility]  Interventional Cardiology  [facility] at [facility]

## 2019-11-02 NOTE — PLAN OF CARE
Problem: Patient Care Overview  Goal: Plan of Care Review  Outcome: Ongoing (interventions implemented as appropriate)   11/02/19 0900   Coping/Psychosocial   Plan of Care Reviewed With patient   SLP evaluation completed. Will sign-off speech/language tx. Please see note for further details and recommendations.

## 2019-11-02 NOTE — PLAN OF CARE
Problem: Diabetes, Type 1 (Adult)  Goal: Signs and Symptoms of Listed Potential Problems Will be Absent, Minimized or Managed (Diabetes, Type 1)  Outcome: Ongoing (interventions implemented as appropriate)   11/02/19 3337   Goal/Outcome Evaluation   Problems Assessed (Type 1 Diabetes) all   Problems Present (Type 1 Diabetes) hyperglycemia

## 2019-11-02 NOTE — PLAN OF CARE
Problem: Patient Care Overview  Goal: Plan of Care Review  Outcome: Ongoing (interventions implemented as appropriate)   11/02/19 8902   OTHER   Outcome Summary pain controlled vitals WNL more alert cards consult bedside will continue to monitor ambulated wants flu shot when available from RX    Coping/Psychosocial   Plan of Care Reviewed With patient   Plan of Care Review   Progress improving

## 2019-11-02 NOTE — THERAPY EVALUATION
Acute Care - Speech Language Pathology Initial Evaluation  [facility]     Patient Name: Thelma Michael  : 1960  MRN: [id number]  Today's Date: 2019  Onset of Illness/Injury or Date of Surgery: 10/31/19     Referring Physician: Marcela      Admit Date: 10/31/2019     Visit Dx:    ICD-10-CM ICD-9-CM   1. TIA (transient ischemic attack) G45.9 435.9   2. Type 2 diabetes mellitus with hyperglycemia, unspecified whether long term insulin use (CMS/[facility]) E11.65 250.00   3. Elevated blood pressure reading with diagnosis of hypertension I10 401.9   4. Hyperlipidemia, unspecified hyperlipidemia type E78.5 272.4   5. 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, controlled, managed as type 2 (CMS/[facility])   • Diabetic hypoglycemia (CMS/[facility])   • Osteoporosis   • Tobacco use   • History of migraine   • Personal history of cardiac murmur   • Pelvic pain   • History of sepsis   • TIA (transient ischemic attack)   • Type 2 diabetes mellitus (CMS/[facility])   • Migraines     Past Medical History:   Diagnosis Date   • Acid reflux    • Acute bronchitis    • Cardiac murmur    • Diabetes mellitus (CMS/[facility])    • H/O echocardiogram 2012    i. LVEF 65%.ii. Mild LVH.iii. Borderline evidence of atrial septal aneurysm.  No PFO.    • History of nuclear stress test 2014    Negative for ischemia and scars; LVEF 77%.     • Hyperlipidemia    • Hypertension    • Impacted cerumen of both ears    • Migraine    • Sinusitis    • Tobacco abuse     quit 4 days ago.     • Urticaria      Past Surgical History:   Procedure Laterality Date   • DENTAL PROCEDURE     • NO PAST SURGERIES          SLP EVALUATION (last 72 hours)      SLP SLC Evaluation     Row Name 19 3512                   Communication  Assessment/Intervention    Document Type  evaluation  -LR        Subjective Information  no complaints  -LR        Patient Observations  alert;cooperative;agree to therapy  -LR        Patient/Family Observations  daughter present  -LR        Patient Effort  good  -LR           General Information    Patient Profile Reviewed  yes  -LR        Pertinent History Of Current Problem  Pt admitted w/ slurred speech and facial droop-now resolved. TIA. Pt passed Rn dysphagia screen. PMH-HTN, DM2  -LR        Precautions/Limitations, Vision  WFL;for purposes of eval  -LR        Precautions/Limitations, Hearing  WFL  -LR        Prior Level of Function-Communication  WFL  -LR        Plans/Goals Discussed with  patient and family  -LR        Barriers to Rehab  none identified  -LR        Patient's Goals for Discharge  patient did not state  -LR        Family Goals for Discharge  family did not state  -LR           Comprehension Assessment/Intervention    Comprehension Assessment/Intervention  Auditory Comprehension;Reading Comprehension  -LR           Auditory Comprehension Assessment/Intervention    Auditory Comprehension (Communication)  WNL  -LR        Answers Questions (Communication)  complex;WFL  -LR        Narrative Discourse  simple paragraph level;WFL  -LR           Reading Comprehension Assessment/Intervention    Reading Comprehension (Communication)  WFL  -LR        Scanning (Reading)  paragraphs;WFL  -LR        Paragraph Level  WFL  -LR           Expression Assessment/Intervention    Expression Assessment/Intervention  verbal expression  -LR           Verbal Expression Assessment/Intervention    Verbal Expression  WNL  -LR        Conversational Discourse/Fluency  WNL  -LR           Oral Motor Structure and Function    Oral Motor Structure and Function  WNL  -LR        Dentition Assessment  edentulous, does not have dentures  -LR           Oral Musculature and Cranial Nerve Assessment    Oral Motor General Assessment   WFL  -LR           Motor Speech Assessment/Intervention    Motor Speech Function  WNL  -LR        Conversational Speech (Communication)  complex;WFL  -LR           Cognitive Assessment Intervention- SLP    Cognitive Function (Cognition)  WFL  -LR        Orientation Status (Cognition)  awareness of basic personal information;person;place;time;situation;WFL  -LR        Memory (Cognitive)  simple;short-term;delayed;WFL  -LR        Attention (Cognitive)  sustained;WFL  -LR        Thought Organization (Cognitive)  concrete divergent;concrete convergent;drawing conclusions;verbal sequencing;WFL  -LR        Reasoning (Cognitive)  simple;WFL  -LR        Problem Solving (Cognitive)  simple;multifactorial;WFL  -LR        Functional Math (Cognitive)  simple;WFL  -LR        Pragmatics (Communication)  WFL  -LR           SLP Clinical Impressions    SLP Diagnosis  Speech, language and cognition are WFL. Pt's speech is clear and without dysarthria. No further speech tx indicated at this time.  -LR        SLC Criteria for Skilled Therapy Interventions Met  no problems identified which require skilled intervention  -LR        Functional Impact  no impact on function  -LR           Recommendations    Therapy Frequency (SLP SLC)  evaluation only  -LR        Anticipated Dischage Disposition  home  -LR          User Key  (r) = Recorded By, (t) = Taken By, (c) = Cosigned By    Initials Name Effective Dates    LR Katlyn Cobian MS CCC-SLP 06/22/15 -              EDUCATION  The patient has been educated in the following areas:     Communication Impairment.    SLP Recommendation and Plan  SLP Diagnosis: Speech, language and cognition are WFL. Pt's speech is clear and without dysarthria. No further speech tx indicated at this time.     SLC Criteria for Skilled Therapy Interventions Met: no problems identified which require skilled intervention  Anticipated Dischage Disposition: home          Plan of Care Reviewed With:  patient                  Time Calculation:     Time Calculation- SLP     Row Name 11/02/19 0946             Time Calculation- SLP    SLP Start Time  0830  -LR      SLP Received On  11/02/19  -LR        User Key  (r) = Recorded By, (t) = Taken By, (c) = Cosigned By    Initials Name Provider Type    Katlyn Davison MS CCC-SLP Speech and Language Pathologist          Therapy Charges for Today     Code Description Service Date Service Provider Modifiers Qty    [id number]  ST EVAL SPEECH AND PROD W LANG  4 11/2/2019 Katlyn Cobian MS CCC-SLP GN 1                     Katlyn Cobian MS CCC-SLP  11/2/2019

## 2019-11-02 NOTE — PROGRESS NOTES
"Neurology       Patient Care Team:  Monet Clark APRN as PCP - General (Nurse Practitioner)  Sharita García DO (Inactive) as PCP - Family Medicine    Chief complaint transient speech difficulty, right facial weakness and facial droop      Subjective .     History: No further episodes of speech difficulty or any weakness or numbness.  Slept well.    ROS: No fever or chest pain    Objective     Vital Signs   Blood pressure 125/89, pulse 82, temperature 97.7 °F (36.5 °C), temperature source Oral, resp. rate 16, height 170.2 cm (67\"), weight 57.2 kg (126 lb), SpO2 96 %.    Physical Exam:              Neuro: Thin middle-aged -American woman in no acute distress, alert, fluent and non-aphasic; appropriate with no dysarthria.   VFF EOMI face symmetric  With all extremities well against gravity, coordination intact    Results Review:              Transthoracic echo showed right to left shunt with positive saline and atrial septal aneurysm    Assessment/Plan     Transient speech difficulty, right face weakness and numbness concerning for TIA in patient with history of similar symptoms, sometimes affecting left side.  With her PFO with ASA I am concerned that she has had TIAs in the past as well.  Cardiology consult pending.  Continue aspirin and high-dose statin for now.    I discussed the patients findings and my recommendations with patient and family    Margaret Martin MD  11/02/19  12:45 PM    "

## 2019-11-02 NOTE — PROGRESS NOTES
[facility] Medicine Services  PROGRESS NOTE    Patient Name: Thelma Michael  : 1960  MRN: [id number]    Date of Admission: 10/31/2019  Primary Care Physician: Monet Clark APRN    Subjective   Subjective     CC:  Slurred speech/facial droop    HPI:  All presenting symptoms have resolved. Dtr in room. NAD. No new issues.     Review of Systems  Gen- No fevers, chills  CV- No chest pain, palpitations  Resp- No cough, dyspnea  GI- No N/V/D, abd pain    All other ROS negative except for what's listed above/HPI    Objective   Objective     Vital Signs:   Temp:  [97 °F (36.1 °C)-98.2 °F (36.8 °C)] 97 °F (36.1 °C)  Heart Rate:  [63-92] 78  Resp:  [16] 16  BP: (125-165)/(76-89) 144/84  Total (NIH Stroke Scale): 0     Physical Exam:  NAD, alert and oriented  OP clear, MMM  PERRL, face symmetric, speech clear  Neck supple  No LAD  RRR  CTAB  +BS, ND, NT  MARCUM  No gross neurologic deficits  No rashes  Normal affect, pleasant    Results Reviewed:    Results from last 7 days   Lab Units 10/31/19  2047 10/31/19  1953 10/31/19  1952   WBC 10*3/mm3 6.95  --   --    HEMOGLOBIN g/dL 15.3  --   --    HEMOGLOBIN, POC g/dL  --  15.3  --    HEMATOCRIT % 45.4  --   --    HEMATOCRIT POC %  --  45  --    PLATELETS 10*3/mm3 298  --   --    INR  1.10  --  0.9     Results from last 7 days   Lab Units 10/31/19  2047 10/31/19  1953   SODIUM mmol/L 136  --    POTASSIUM mmol/L 3.4*  --    CHLORIDE mmol/L 96*  --    CO2 mmol/L 27.0  --    BUN mg/dL 15  --    CREATININE mg/dL 0.89 0.90   GLUCOSE mg/dL 317*  --    CALCIUM mg/dL 9.6  --    ALT (SGPT) U/L 18  --    AST (SGOT) U/L 16  --    TROPONIN T ng/mL <0.010  --    PROBNP pg/mL 105.6  --      Estimated Creatinine Clearance: 60.7 mL/min (by C-G formula based on SCr of 0.89 mg/dL).    Microbiology Results Abnormal     None          Imaging Results (Last 24 Hours)     ** No results found for the last 24 hours. **          Results for orders placed  during the hospital encounter of 10/31/19   Adult Transthoracic Echo Complete W/ Cont if Necessary Per Protocol (With Agitated Saline)    Narrative · Left ventricular systolic function is hyperdynamic (EF > 70).  · Left ventricular diastolic dysfunction (grade I) consistent with   impaired relaxation.  · Left ventricular wall thickness is consistent with mild concentric   hypertrophy.  · Interatrial septal aneurysm present. Saline test results are positive   for right to left atrial level shunt.          I have reviewed the medications:  Scheduled Meds:    amLODIPine 5 mg Oral Q24H   aspirin 81 mg Oral Daily   atorvastatin 80 mg Oral Nightly   gabapentin 400 mg Oral TID   hydrochlorothiazide 12.5 mg Oral Daily   insulin detemir 20 Units Subcutaneous Nightly   insulin lispro 0-14 Units Subcutaneous 4x Daily With Meals & Nightly   lisinopril 40 mg Oral Daily   pantoprazole 40 mg Oral Daily   sodium chloride 10 mL Intravenous Q12H     Continuous Infusions:    niCARdipine 5-15 mg/hr Last Rate: Stopped (11/01/19 0017)     PRN Meds:.•  acetaminophen  •  dextrose  •  dextrose  •  glucagon (human recombinant)  •  influenza vaccine  •  sodium chloride      Assessment/Plan   Assessment / Plan     Active Hospital Problems    Diagnosis  POA   • **TIA (transient ischemic attack) [G45.9]  Yes   • Type 2 diabetes mellitus (CMS/HCC) [E11.9]  Yes   • Hyperlipidemia [E78.5]  Yes   • Hypertension [I10]  Yes      Resolved Hospital Problems   No resolved problems to display.        Brief Hospital Course to date:  Thelma Michael is a 58 y.o. female here with transient facial droop/dysarthria/TIA    TIA  --patient endorses she has been experiencing these symptoms once per year for several years, but never had medical treatment until now   --asa/statin  --MRI without acute infarct  --ECHO showed PFO, will consult Cardiology   --Neurology following, continue ASA and Statin     HTN  -- uncontrolled, now improving   --titrate  medications    DM  --basal/bolus insulin, A1C 14  --discussed stopping Glipizide and staying on prandial/ basal at home   --doesn't have insurance or PCP, has been seeing Nida Robin, but not seen since July/ August  --states that since her A1c was under 10, provider stopped Novolog and started on orals with Levemir. She is very noncompliant with her diet.   --DM educator seen   -- consulted Nutritionist to discuss DM diet     HL    DVT Prophylaxis:  SCDS    Disposition: I expect the patient to be discharged TBD.    CODE STATUS:   Code Status and Medical Interventions:   Ordered at: 10/31/19 223     Level Of Support Discussed With:    Patient     Code Status:    CPR     Medical Interventions (Level of Support Prior to Arrest):    Full         Electronically signed by HOWIE Escalona, 11/02/19, 1:37 PM.

## 2019-11-03 VITALS
TEMPERATURE: 98.1 F | OXYGEN SATURATION: 100 % | SYSTOLIC BLOOD PRESSURE: 145 MMHG | HEIGHT: 67 IN | HEART RATE: 79 BPM | DIASTOLIC BLOOD PRESSURE: 87 MMHG | BODY MASS INDEX: 19.78 KG/M2 | WEIGHT: 126 LBS | RESPIRATION RATE: 16 BRPM

## 2019-11-03 LAB
BILIRUB UR QL STRIP: NEGATIVE
CLARITY UR: CLEAR
COLOR UR: YELLOW
GLUCOSE BLDC GLUCOMTR-MCNC: 128 MG/DL (ref 70–130)
GLUCOSE BLDC GLUCOMTR-MCNC: 284 MG/DL (ref 70–130)
GLUCOSE BLDC GLUCOMTR-MCNC: 88 MG/DL (ref 70–130)
GLUCOSE UR STRIP-MCNC: ABNORMAL MG/DL
HGB UR QL STRIP.AUTO: NEGATIVE
KETONES UR QL STRIP: NEGATIVE
LEUKOCYTE ESTERASE UR QL STRIP.AUTO: NEGATIVE
NITRITE UR QL STRIP: NEGATIVE
PH UR STRIP.AUTO: 5.5 [PH] (ref 5–8)
PROT UR QL STRIP: NEGATIVE
SP GR UR STRIP: 1.02 (ref 1–1.03)
UROBILINOGEN UR QL STRIP: ABNORMAL

## 2019-11-03 PROCEDURE — 90686 IIV4 VACC NO PRSV 0.5 ML IM: CPT | Performed by: INTERNAL MEDICINE

## 2019-11-03 PROCEDURE — 81003 URINALYSIS AUTO W/O SCOPE: CPT | Performed by: NURSE PRACTITIONER

## 2019-11-03 PROCEDURE — G0008 ADMIN INFLUENZA VIRUS VAC: HCPCS | Performed by: INTERNAL MEDICINE

## 2019-11-03 PROCEDURE — G0378 HOSPITAL OBSERVATION PER HR: HCPCS

## 2019-11-03 PROCEDURE — 25010000002 INFLUENZA VAC SPLIT QUAD 0.5 ML SUSPENSION PREFILLED SYRINGE: Performed by: INTERNAL MEDICINE

## 2019-11-03 PROCEDURE — 99217 PR OBSERVATION CARE DISCHARGE MANAGEMENT: CPT | Performed by: NURSE PRACTITIONER

## 2019-11-03 PROCEDURE — 99212 OFFICE O/P EST SF 10 MIN: CPT | Performed by: PSYCHIATRY & NEUROLOGY

## 2019-11-03 PROCEDURE — 82962 GLUCOSE BLOOD TEST: CPT

## 2019-11-03 RX ORDER — LISINOPRIL 40 MG/1
40 TABLET ORAL DAILY
Qty: 30 TABLET | Refills: 0 | Status: SHIPPED | OUTPATIENT
Start: 2019-11-03 | End: 2020-05-29 | Stop reason: SDUPTHER

## 2019-11-03 RX ORDER — FLUCONAZOLE 100 MG/1
150 TABLET ORAL ONCE
Status: COMPLETED | OUTPATIENT
Start: 2019-11-03 | End: 2019-11-03

## 2019-11-03 RX ORDER — INSULIN GLARGINE 100 [IU]/ML
20 INJECTION, SOLUTION SUBCUTANEOUS NIGHTLY
Qty: 6 ML | Refills: 0 | Status: SHIPPED | OUTPATIENT
Start: 2019-11-03 | End: 2019-11-03 | Stop reason: HOSPADM

## 2019-11-03 RX ORDER — AMLODIPINE BESYLATE 5 MG/1
5 TABLET ORAL
Qty: 30 TABLET | Refills: 0 | Status: SHIPPED | OUTPATIENT
Start: 2019-11-04 | End: 2020-04-28 | Stop reason: SDUPTHER

## 2019-11-03 RX ORDER — ATORVASTATIN CALCIUM 80 MG/1
80 TABLET, FILM COATED ORAL NIGHTLY
Qty: 30 TABLET | Refills: 0 | Status: SHIPPED | OUTPATIENT
Start: 2019-11-03 | End: 2020-04-07 | Stop reason: SDUPTHER

## 2019-11-03 RX ADMIN — FLUCONAZOLE 150 MG: 100 TABLET ORAL at 11:59

## 2019-11-03 RX ADMIN — HYDROCHLOROTHIAZIDE 12.5 MG: 12.5 TABLET ORAL at 08:23

## 2019-11-03 RX ADMIN — ASPIRIN 81 MG: 81 TABLET, COATED ORAL at 08:23

## 2019-11-03 RX ADMIN — AMLODIPINE BESYLATE 5 MG: 5 TABLET ORAL at 08:23

## 2019-11-03 RX ADMIN — SODIUM CHLORIDE, PRESERVATIVE FREE 10 ML: 5 INJECTION INTRAVENOUS at 08:25

## 2019-11-03 RX ADMIN — GABAPENTIN 400 MG: 400 CAPSULE ORAL at 15:52

## 2019-11-03 RX ADMIN — INFLUENZA VIRUS VACCINE 0.5 ML: 15; 15; 15; 15 SUSPENSION INTRAMUSCULAR at 13:59

## 2019-11-03 RX ADMIN — LISINOPRIL 40 MG: 40 TABLET ORAL at 08:23

## 2019-11-03 RX ADMIN — GABAPENTIN 400 MG: 400 CAPSULE ORAL at 08:23

## 2019-11-03 RX ADMIN — PANTOPRAZOLE SODIUM 40 MG: 40 TABLET, DELAYED RELEASE ORAL at 08:23

## 2019-11-03 NOTE — PROGRESS NOTES
"Neurology       Patient Care Team:  Monet Clark APRN as PCP - General (Nurse Practitioner)  Sharita García DO (Inactive) as PCP - Family Medicine    Chief complaint transient speech difficulty and facial sx      Subjective .     History:  No recurrence of sx. Dtr concerned re: very high blood sugars and then as low as 88. Walked in hallway twice yesterday, very tiring.    ROS: no fever, cp, palpitations    Objective     Vital Signs   Blood pressure 128/69, pulse 60, temperature 96.8 °F (36 °C), temperature source Axillary, resp. rate 18, height 170.2 cm (67\"), weight 57.2 kg (126 lb), SpO2 99 %.    Physical Exam:              Neuro: thin ma aaw in nad, alert, fluent, appropriate. No dysarthria  eomi face symm   Moves all ext well against gravity      Assessment/Plan     Transient speech difficulty, right facial weakness and numbness concerning for TIA, with previous focal neurologic symptoms. Appreciate cardiology input, d/w pt and dtr that risk of TIA from PFO is low, greater concern for eg afib, and now planning for LIZANDRO and heart monitor.     I discussed the patients findings and my recommendations with patient and family    Margaret Martin MD  11/03/19  10:59 AM    "

## 2019-11-03 NOTE — CONSULTS
"                  Clinical Nutrition     Reason for Visit:   Nurse practioner/physician assistent consult, Need for education    Patient Name: Thelma Michael  YOB: 1960  MRN: [id number]  Date of Encounter: 11/03/19 3:11 PM  Admission date: 10/31/2019    Nutrition Assessment   Assessment     Admission diagnosis  ?TIA    Additional diagnosis/conditions/procedures this admission  Transient facial droop/dysarthria    Additional PMH/procedures:  HTN  HLP  GERD  T2DM  Tobacco    Dental procedure    Reported/Observed/Food/Nutrition Related History:      Patient and daugther in room at visit. Reviewed nutrition education for T1DM. Patient reports she has type \"1.5\" DM. Explained the basics of carbohydrate containing foods and carbohydrate counting. Patient reports she has been trying to do 6 smaller meals/snacks during the day instead of 3 large meals as this is easier for her to maintain while at work. Daughter at bedside attentive with education and taking notes/asking questions. Explained review of a nutrition label to determine carbohydrate choice per serving. Patient reported she had previously been reading \"total sugars\" instead of \"total carbohydrates.\" Demonstrated teach back with nutrition label reading and determining how many carbohydrate choices. Explained importance of protein with meals and snacks and balancing out diet with non-starchy vegetables, whole grains, etc. Daughter also asking about sodium for high blood pressure. Discussed low sodium options like not adding salt to foods while cooking, using different spices, shooting for <2000mg/day of sodium if possible. Patient and daughter attentive and eager to learn. Requesting referral for outpatient nutrition education.    Pertinent Labs     Results from last 7 days   Lab Units 11/03/19  1155 11/03/19  0754 11/03/19  0127 11/02/19  2100 11/02/19  1645 11/02/19  1219   GLUCOSE mg/dL 284* 88 128 317* 290* 299*     Lab Results   Lab Value " Date/Time    HGBA1C 14.10 (H) 2019 0605    HGBA1C 9.50 (H) 2019 1049       Nutrition Diagnosis      updated 11/3  Problem Altered nutrition related laboratory values   Etiology ?dietary/lifestyle choices and medical noncompliance   Signs/Symptoms HgbA1c = 14.1%   Status: RD provided nutrition education for T1DM    Nutrition Intervention   1.  Follow treatment progress, Care plan reviewed  2.  Education provided - RDN provided T1DM nutrition education. Written materials from the [employer] provided to patient. Reviewed carbohydrate containing foods, moderation and portion control, limiting sugar sweetened beverages / choosing diet drinks, physical activity as OK per MD, and encouraged compliance with medications. Patient acknowledged understanding and asked appropriate questions. Daughter and patient very interested in heart healthy/consistent carbohydrate diet education following discharge. RD sent referral to [facility] outpatient nutrition services at patient request.    Goal:   General: Nutrition support treatment  PO: Establish PO    FSB - 180 mg/dL  Long term goals: HgbA1c < 7.00%      Monitoring/Evaluation:   Per protocol, PO intake, Supplement intake, Pertinent labs, Weight    Will Continue to follow per protocol    Aysha Sequeira, VIVIAN, LD  Time Spent: 45 minutes

## 2019-11-03 NOTE — DISCHARGE SUMMARY
[facility] Medicine Services  DISCHARGE SUMMARY    Patient Name: Thelma Michael  : 1960  MRN: [id number]    Date of Admission: 10/31/2019  Date of Discharge:  11/3/19  Primary Care Physician: Monet Clark APRN    Consults     Date and Time Order Name Status Description    2019 0905 Inpatient Cardiology Consult      10/31/2019 2353 Inpatient Neurology Consult Stroke Completed           Hospital Course     Presenting Problem:   TIA (transient ischemic attack) [G45.9]    Active Hospital Problems    Diagnosis  POA   • **TIA (transient ischemic attack) [G45.9]  Yes   • Type 2 diabetes mellitus (CMS/HCC) [E11.9]  Yes   • Hyperlipidemia [E78.5]  Yes   • Hypertension [I10]  Yes      Resolved Hospital Problems   No resolved problems to display.          Hospital Course:  Thelma Michael is a 58 y.o. female with pmh of uncontrolled DM, HTN, HLP presents with transient facial droop, dysarthria and admitted for TIA work up. Patient reports having these symptoms at least yearly, however never been evaluated when occurred. MRI without acute infarct. Echo revealed PFO and cardiology consulted for evaluation. Neurology following and statin increased, patient continued on aspirin. Lisinopril increased and norvasc added.    Cardiology felt TIA unlikely cardioembolic from PFO. They would like to see her back in office for further evaluation with LIZANDRO and cardiac monitor placement to evaluate for A fib.     She has uncontrolled DM and poor dietary compliance. States she takes insulin and glipizide prescribed to her as written and does not miss. A1c >14% this admit. Will adjust to long acting insulin nightly with sliding scale. Have asked CM to help with med assist. She is agreeable to follow up at [facility] this Saturday.    Discharge Follow Up Recommendations for labs/diagnostics:   [facility] Saturday; CM to assist with medication at [facility]  Follow up with Dr. Mitchell,  cardiology in 2mos for LIZANDRO/ cardiac monitor    Day of Discharge     HPI:   Patient resting in bed. Presenting symptoms resolved and now at baseline. States she follows at [facility] and are not open until next Saturday. They provide her with medication.    Review of Systems  Gen- No fevers, chills  CV- No chest pain, palpitations  Resp- No cough, dyspnea  GI- No N/V/D, abd pain        Otherwise ROS is negative except as mentioned in the HPI.    Vital Signs:   Temp:  [96.8 °F (36 °C)-98.1 °F (36.7 °C)] 96.8 °F (36 °C)  Heart Rate:  [60-95] 60  Resp:  [16-18] 18  BP: (123-151)/(69-87) 128/69     Physical Exam:  Constitutional: No acute distress, awake, alert  HENT: NCAT, mucous membranes moist  Respiratory: Clear to auscultation bilaterally, respiratory effort normal   Cardiovascular: RRR, no murmurs, rubs, or gallops, palpable pedal pulses bilaterally  Gastrointestinal: Positive bowel sounds, soft, nontender, nondistended  Musculoskeletal: No bilateral ankle edema  Psychiatric: Appropriate affect, cooperative  Neurologic: Oriented x 3, strength symmetric in all extremities, Cranial Nerves grossly intact to confrontation, speech clear  Skin: No rashes      Pertinent  and/or Most Recent Results     Results from last 7 days   Lab Units 10/31/19  2047 10/31/19  1953   WBC 10*3/mm3 6.95  --    HEMOGLOBIN g/dL 15.3  --    HEMOGLOBIN, POC g/dL  --  15.3   HEMATOCRIT % 45.4  --    HEMATOCRIT POC %  --  45   PLATELETS 10*3/mm3 298  --    SODIUM mmol/L 136  --    POTASSIUM mmol/L 3.4*  --    CHLORIDE mmol/L 96*  --    CO2 mmol/L 27.0  --    BUN mg/dL 15  --    CREATININE mg/dL 0.89 0.90   GLUCOSE mg/dL 317*  --    CALCIUM mg/dL 9.6  --      Results from last 7 days   Lab Units 10/31/19  2047 10/31/19  1952   BILIRUBIN mg/dL 0.4  --    ALK PHOS U/L 117  --    ALT (SGPT) U/L 18  --    AST (SGOT) U/L 16  --    PROTIME Seconds 13.6 10.9*   INR  1.10 0.9   APTT seconds 25.3  --      Results from last 7 days   Lab  Units 11/01/19  0605   CHOLESTEROL mg/dL 219*   TRIGLYCERIDES mg/dL 211*   HDL CHOL mg/dL 58     Results from last 7 days   Lab Units 11/01/19  0605 10/31/19  2047   HEMOGLOBIN A1C % 14.10*  --    PROBNP pg/mL  --  105.6   TROPONIN T ng/mL  --  <0.010       Brief Urine Lab Results  (Last result in the past 365 days)      Color   Clarity   Blood   Leuk Est   Nitrite   Protein   CREAT   Urine HCG        10/31/19 2040 Yellow Clear Negative Negative Negative Trace               Microbiology Results Abnormal     None          Imaging Results (All)     Procedure Component Value Units Date/Time    MRI Brain With & Without Contrast [[id number]] Collected:  11/01/19 0902     Updated:  11/01/19 1020    Narrative:       EXAMINATION: MRI BRAIN W WO CONTRAST-     INDICATION: G45.9-Transient cerebral ischemic attack, unspecified;  E11.65-Type 2 diabetes mellitus with hyperglycemia; I10-Essential  (primary) hypertension; E78.5-Hyperlipidemia, unspecified; Z74.09-Other  reduced mobility.     TECHNIQUE: Sagittal, axial and coronal images of the brain are  displayed. Images were acquired prior to and following intravenous  contrast.     COMPARISON: CT scan of the head dated 10/31/2019.     FINDINGS: On the diffusion images there is a tiny, punctate area of  increased signal just lateral to the right lateral ventricle. The  diffusion ADC map does not show an area of restricted diffusion. There  are extensive periventricular white matter changes. There is no mass or  hemorrhage. There is no midline shift or extraaxial fluid collection.  There is no abnormal contrast enhancement.       Impression:       There is a tiny, punctate area of abnormal signal on the  diffusion images just lateral to the right lateral ventricle. This does  not, however, demonstrate distinct restricted diffusion and therefore  does not fulfill the criteria for the diagnosis of acute punctate  infarct. Otherwise there are extensive white matter changes  consistent  with aging.      D:  11/01/2019  E:  11/01/2019     This report was finalized on 11/1/2019 10:17 AM by Dr. Gene Soriano MD.       CT Angiogram Neck [[id number]] Collected:  10/31/19 2052     Updated:  10/31/19 2054    Narrative:       CTA Neck    This was included in the report of the CTA head. Please see the report of the CTA head for the complete discussion of both the CTA neck and the CTA head.    Signer Name: Souleymane Clements MD   Signed: 10/31/2019 8:52 PM   Workstation Name: [id number]    [facility]    CT Angiogram Head [[id number]] Collected:  10/31/19 2044     Updated:  10/31/19 2046    Narrative:           CTA of the neck and head    Technique: CT angio head and neck following administration of 100 cc Isovue-370 IV contrast, including coronal and sagittal MIP 3-D reformatted images.     Indication: Stroke    Comparison: No pertinent prior study    TECHNIQUE:   CTA of the head and neck with contrast. Coronal and sagittal reconstructions were obtained.  Radiation dose reduction techniques included automated exposure control or exposure modulation based on body size. Radiation audit for number of CT and nuclear  cardiology exams performed in the last year: 1.     Evaluation for a significant carotid arterial stenosis is based on the NASCET criteria.    Findings:      CT angiography neck:     The visualized aortic arch and its major branch vessels are within normal limits.    Bilateral common and internal carotid arteries as well as both carotid bifurcations are of normal course and caliber.    Extracranial vertebral arteries are of normal course and caliber. The vertebral arteries appear codominant.      CT angiography head:     The intracranial portions of the internal carotid arteries are of normal course and caliber.    MCA and MIMI vessels are patent bilaterally. There is the normal variant of a hypoplastic A1 segment on the right side. There is a symmetric distal runoff  intracranially without evidence of aneurysm or flow-limiting stenosis.    Basilar artery is widely patent. Bilateral posterior cerebral arteries demonstrate symmetric distal runoff without evidence of aneurysm or flow-limiting stenosis.     Major dural venous sinuses show no evidence of filling defect.         Impression:           No evidence of flow-limiting stenosis or aneurysm.    Signer Name: Souleymane Clements MD   Signed: 10/31/2019 8:44 PM   Workstation Name: [facility]    [facility]iology [facility]    CT Cerebral Perfusion With & Without Contrast [[id number]] Collected:  10/31/19 2033     Updated:  10/31/19 2035    Narrative:       CT CEREBRAL PERFUSION W WO CONTRAST    HISTORY:   TIA    TECHNIQUE:   Axial CT images of the brain without and with intravenous contrast using cerebral perfusion protocol. Post-processing parametric maps were created using RAPID software and reviewed. Radiation dose reduction techniques included automated exposure control  or exposure modulation based on body size. CT and nuclear cardiology exams in the last 12 months: 1.    COMPARISON:   No pertinent prior study    FINDINGS:   Arterial input function is optimal.     *  Perfusion parameters:    *  Ischemic tissue volume (Tmax > 6 sec.): 0 mL.  *  Infarct core volume (CBF < 30%): 0 mL.  *  Mismatch (penumbra) volume: 0 mL.  *  Mismatch ratio: None  *  Location/territory: Not applicable.    COLLATERAL CIRCULATION PARAMETERS:    *  Volume poor collateral perfusion (Tmax > 10 sec.): 0 mL.  *  Hypoperfusion index (Tmax >10 sec./Tmax > 6 sec.): Not applicable.  *  CBV Index (rCBV in Tmax > 6 sec. region): Not applicable.        Impression:       Normal brain perfusion CT.          Signer Name: Souleymane Clements MD   Signed: 10/31/2019 8:33 PM   Workstation Name: [facility]    Radiology [facility]    CT Head Without Contrast Stroke Protocol [[id number]] Collected:  10/31/19 1948     Updated:  10/31/19 1950    Narrative:        CT Head WO Code Stroke    HISTORY:   Acute onset of facial droop and slurred speech    TECHNIQUE:   Axial unenhanced head CT. Radiation dose reduction techniques included automated exposure control or exposure modulation based on body size. Count of known CT and cardiac nuc med studies performed in previous 12 months: 1.     Time of scan: 7:36 PM    COMPARISON:   No pertinent prior study    FINDINGS:   The ventricles are mildly generous in size. Cortical sulci are correspondingly prominent. No extraaxial fluid collections are seen.    No parenchymal or subarachnoid hemorrhage is present. Prominent periventricular hypodensities are demonstrated which are nonspecific but likely represent white matter microvascular change. No CT evidence of mass or acute infarct.    The mastoid air cells are clear. The visualized paranasal sinuses demonstrate inflammatory change in the left side of the sphenoid sinus and the ethmoid air cells..  Orbital structures demonstrate no acute findings.      Impression:       Impression:  1. No clearly acute intracranial pathology demonstrated.  2. Mild cerebral atrophy and prominent periventricular hypodensities which are thought to represent white matter microvascular change.  3. Report called to Lobito Martinez at 7:43 PM, Thursday, October 31, 2019.    Signer Name: Souleymane Clements MD   Signed: 10/31/2019 7:48 PM   [facility]station [facility]: [id number]    [facility]                    Results for orders placed during the hospital encounter of 10/31/19   Adult Transthoracic Echo Complete W/ Cont if Necessary Per Protocol (With Agitated Saline)    Narrative · Left ventricular systolic function is hyperdynamic (EF > 70).  · Left ventricular diastolic dysfunction (grade I) consistent with   impaired relaxation.  · Left ventricular wall thickness is consistent with mild concentric   hypertrophy.  · Interatrial septal aneurysm present. Saline test results are positive   for  right to left atrial level shunt.            Discharge Details        Discharge Medications      New Medications      Instructions Start Date   amLODIPine 5 MG tablet  Commonly known as:  NORVASC   5 mg, Oral, Every 24 Hours Scheduled   Start Date:  11/4/2019     atorvastatin 80 MG tablet  Commonly known as:  LIPITOR   80 mg, Oral, Nightly      Insulin Glargine 100 UNIT/ML injection pen  Commonly known as:  BASAGLAR KWIKPEN  Replaces:  Insulin Glargine 100 UNIT/ML injection pen   20 Units, Subcutaneous, Nightly      insulin lispro 100 UNIT/ML injection  Commonly known as:  humaLOG   0-14 Units, Subcutaneous, 4 Times Daily With Meals & Nightly         Changes to Medications      Instructions Start Date   lisinopril 40 MG tablet  Commonly known as:  PRINIVIL,ZESTRIL  What changed:    · medication strength  · how much to take   40 mg, Oral, Daily         Continue These Medications      Instructions Start Date   albuterol sulfate  (90 Base) MCG/ACT inhaler  Commonly known as:  PROVENTIL HFA;VENTOLIN HFA;PROAIR HFA   2 puffs, Inhalation, Every 4 Hours PRN      aspirin 81 MG tablet   81 mg, Oral, Daily      b complex vitamins capsule   1 capsule, Oral, Daily      BD PEN NEEDLE TOOTIE U/F 32G X 4 MM misc  Generic drug:  Insulin Pen Needle   USE ONE NEEDLE WITH INSULIN 4 TIMES DAILY      Blood Glucose Test strip   1 each, In Vitro, 3 Times Daily, One touch strips      calcium carbonate 600 MG tablet  Commonly known as:  CALCIUM 600   600 mg, Oral, Daily      carbamide peroxide 6.5 % otic solution  Commonly known as:  DEBROX   5 drops, Both Ears, 2 Times Daily, Use as directed; For: Impacted cerumen of both ears      dicyclomine 20 MG tablet  Commonly known as:  BENTYL   20 mg, Oral, Every 6 Hours PRN      FREESTYLE FREEDOM LITE w/Device kit   1 kit, Does not apply, 3 Times Daily, TEST; For: Diabetic hypoglycemia, Diabetic peripheral neuropathy      gabapentin 400 MG capsule  Commonly known as:  NEURONTIN   400 mg,  Oral, 3 Times Daily      GLUCAGON EMERGENCY 1 MG injection  Generic drug:  glucagon   Use as directed; For: Diabetics mellitus      glucose monitor monitoring kit   1 each, Does not apply, 3 Times Daily, One touch glucometer      hydroCHLOROthiazide 12.5 MG capsule  Commonly known as:  MICROZIDE   12.5 mg, Oral, Daily      ONE TOUCH LANCETS misc   1 each, Does not apply, 3 Times Daily      pantoprazole 40 MG EC tablet  Commonly known as:  PROTONIX   40 mg, Oral, Daily      VITAMIN B12 PO   Vitamin B12 TABS      VITAMIN C PO   Vitamin C TABS         Stop These Medications    glipizide 5 MG ER tablet  Commonly known as:  GLUCOTROL XL     Insulin Glargine 100 UNIT/ML injection pen  Commonly known as:  LANTUS SOLOSTAR  Replaced by:  Insulin Glargine 100 UNIT/ML injection pen     lidocaine-prilocaine 2.5-2.5 % cream  Commonly known as:  EMLA     pravastatin 20 MG tablet  Commonly known as:  PRAVACHOL            No Known Allergies      Discharge Disposition:  Home or Self Care    Diet:  Hospital:  Diet Order   Procedures   • Diet Regular; Cardiac, Consistent Carbohydrate     Discharge:   Diet Instructions     Diet: Regular, Consistent Carbohydrate, Cardiac; Thin      Discharge Diet:   Regular  Consistent Carbohydrate  Cardiac       Fluid Consistency:  Thin          Discharge Activity:   Activity Instructions     Activity as Tolerated              CODE STATUS:    Code Status and Medical Interventions:   Ordered at: 10/31/19 0907     Level Of Support Discussed With:    Patient     Code Status:    CPR     Medical Interventions (Level of Support Prior to Arrest):    Full         No future appointments.    Additional Instructions for the Follow-ups that You Need to Schedule     Discharge Follow-up with PCP   As directed       Currently Documented PCP:    Monet Clark APRN    PCP Phone Number:    [phone]     Follow Up Details:  follow up with [facility] on Saturday         Discharge Follow-up with  Specialty: Dr Mitchell; 2 Months   As directed      Specialty:  Dr Mitchell    Follow Up:  2 Months               Time Spent on Discharge:  38 minutes    Electronically signed by HOWIE Giang, 11/03/19, 11:32 AM.

## 2019-11-04 ENCOUNTER — READMISSION MANAGEMENT (OUTPATIENT)
Dept: CALL CENTER | Facility: HOSPITAL | Age: 59
End: 2019-11-04

## 2019-11-04 ENCOUNTER — TELEPHONE (OUTPATIENT)
Dept: CARDIOLOGY | Facility: CLINIC | Age: 59
End: 2019-11-04

## 2019-11-04 NOTE — TELEPHONE ENCOUNTER
----- Message from Ludwig Mitchell MD sent at 11/2/2019  1:11 PM EDT -----  Melina, we saw this patient over the weekend on consult for TIA.  Plan was to discharge her before Monday.  Can you set up this patient for an outpatient LIZANDRO.  On the same day I like to place a 30-day M cot to rule out A. fib.  I placed an order for both tests.  Thanks

## 2019-11-04 NOTE — OUTREACH NOTE
Prep Survey      Responses   Facility patient discharged from?  [facility]   Is patient eligible?  Yes   Discharge diagnosis  TIA, uncontrolled DM   Does the patient have one of the following disease processes/diagnoses(primary or secondary)?  Stroke (TIA)   Does the patient have Home health ordered?  No   Is there a DME ordered?  No   Medication alerts for this patient  insulin changes   Prep survey completed?  Yes          Winter Waite RN

## 2019-11-06 ENCOUNTER — READMISSION MANAGEMENT (OUTPATIENT)
Dept: CALL CENTER | Facility: HOSPITAL | Age: 59
End: 2019-11-06

## 2019-11-06 NOTE — OUTREACH NOTE
Stroke Week 1 Survey      Responses   Facility patient discharged from?  [facility]   Does the patient have one of the following disease processes/diagnoses(primary or secondary)?  Stroke (TIA)   Is there a successful TCM telephone encounter documented?  No   Week 1 attempt successful?  Yes   Call start time  1033   Call end time  1048   Discharge diagnosis  TIA, uncontrolled DM   Medication alerts for this patient  insulin changes   Meds reviewed with patient/caregiver?  Yes   Is the patient having any side effects they believe may be caused by any medication additions or changes?  No   Does the patient have all medications ordered at discharge?  Yes   Is the patient taking all medications as directed (includes completed medication regime)?  Yes   Does the patient have a primary care provider?   Yes   Does the patient have an appointment with their PCP within 7 days of discharge?  Yes   Has the patient kept scheduled appointments due by today?  Yes   Comments  Patient describes lower belly pain and has not had a BM in 4 or 5 days. She has a laxative she generally uses at home she will try. If no results     Has home health visited the patient within 72 hours of discharge?  N/A   Psychosocial issues?  No   Does the patient require any assistance with activities of daily living such as eating, bathing, dressing, walking, etc.?  No   Does the patient have any residual symptoms from stroke/TIA?  No   Does the patient understand the diet ordered at discharge?  Yes   Did the patient receive a copy of their discharge instructions?  Yes   Nursing interventions  Reviewed instructions with patient   What is the patient's perception of their health status since discharge?  Improving   Nursing interventions  Nurse provided patient education   Is the patient able to teach back FAST for Stroke?  Yes   Is the patient/caregiver able to teach back the risk factors for a stroke?  High Cholesterol, High blood pressure-goal below  120/80, History of TIAs, Diabetes, Carotid or other artery disease   Is the patient/caregiver able to teach back signs and symptoms related to disease process for when to call PCP?  Yes   Is the patient/caregiver able to teach back signs and symptoms related to disease process for when to call 911?  Yes   Is the patient/caregiver able to teach back the hierarchy of who to call/visit for symptoms/problems? PCP, Specialist, Home health nurse, Urgent Care, ED, 911  Yes   Week 1 call completed?  Yes          Michel Mccallum RN

## 2019-11-12 ENCOUNTER — TRANSCRIBE ORDERS (OUTPATIENT)
Dept: DIABETES SERVICES | Facility: HOSPITAL | Age: 59
End: 2019-11-12

## 2019-11-12 DIAGNOSIS — E10.9 TYPE 1 DIABETES MELLITUS WITHOUT COMPLICATION (HCC): Primary | ICD-10-CM

## 2019-11-13 ENCOUNTER — READMISSION MANAGEMENT (OUTPATIENT)
Dept: CALL CENTER | Facility: HOSPITAL | Age: 59
End: 2019-11-13

## 2019-11-13 NOTE — OUTREACH NOTE
Stroke Week 2 Survey      Responses   Facility patient discharged from?  [place]   Does the patient have one of the following disease processes/diagnoses(primary or secondary)?  Stroke (TIA)   Week 2 attempt successful?  Yes   Call start time  1548   Call end time  1555   Discharge diagnosis  TIA, uncontrolled DM   Is patient permission given to speak with other caregiver?  Yes   List who call center can speak with  daughter, son   Meds reviewed with patient/caregiver?  Yes   Is the patient having any side effects they believe may be caused by any medication additions or changes?  No   Does the patient have all medications ordered at discharge?  Yes   Is the patient taking all medications as directed (includes completed medication regime)?  Yes   Does the patient have a primary care provider?   Yes   Comments regarding PCP  Sharita García, DO at [facility]   Has the patient kept scheduled appointments due by today?  Yes   Has home health visited the patient within 72 hours of discharge?  N/A   Psychosocial issues?  No   Does the patient require any assistance with activities of daily living such as eating, bathing, dressing, walking, etc.?  No   Does the patient have any residual symptoms from stroke/TIA?  No   Does the patient understand the diet ordered at discharge?  Yes   Did the patient receive a copy of their discharge instructions?  Yes   Nursing interventions  Reviewed instructions with patient   What is the patient's perception of their health status since discharge?  Improving   Nursing interventions  Nurse provided patient education   Is the patient able to teach back FAST for Stroke?  Yes   Is the patient/caregiver able to teach back the risk factors for a stroke?  High blood pressure-goal below 120/80, Diabetes   Is the patient/caregiver able to teach back signs and symptoms related to disease process for when to call PCP?  Yes   Is the patient/caregiver able to teach back signs and symptoms  related to disease process for when to call 911?  Yes   Is the patient/caregiver able to teach back the hierarchy of who to call/visit for symptoms/problems? PCP, Specialist, Home health nurse, Urgent Care, ED, 911  Yes   Week 2 call completed?  Yes          Lisy Jain RN

## 2019-11-19 ENCOUNTER — HOSPITAL ENCOUNTER (OUTPATIENT)
Dept: CARDIOLOGY | Facility: HOSPITAL | Age: 59
Discharge: HOME | End: 2019-11-19
Admitting: INTERNAL MEDICINE

## 2019-11-19 VITALS
OXYGEN SATURATION: 100 % | WEIGHT: 126.1 LBS | SYSTOLIC BLOOD PRESSURE: 168 MMHG | DIASTOLIC BLOOD PRESSURE: 111 MMHG | BODY MASS INDEX: 19.79 KG/M2 | HEIGHT: 67 IN | RESPIRATION RATE: 18 BRPM | HEART RATE: 89 BPM

## 2019-11-19 DIAGNOSIS — G45.9 TIA (TRANSIENT ISCHEMIC ATTACK): ICD-10-CM

## 2019-11-19 LAB
BH CV ECHO MEAS - RAP SYSTOLE: 8 MMHG
BH CV ECHO MEAS - RVSP: 39 MMHG
BH CV ECHO MEAS - TR MAX PG: 31
BH CV ECHO MEAS - TR MAX VEL: 3 CM/SEC
BH CV VAS BP LEFT ARM: NORMAL MMHG
LV EF 2D ECHO EST: 65 %

## 2019-11-19 PROCEDURE — 93325 DOPPLER ECHO COLOR FLOW MAPG: CPT | Performed by: INTERNAL MEDICINE

## 2019-11-19 PROCEDURE — 93312 ECHO TRANSESOPHAGEAL: CPT | Performed by: INTERNAL MEDICINE

## 2019-11-19 PROCEDURE — 25010000002 FENTANYL CITRATE (PF) 100 MCG/2ML SOLUTION: Performed by: INTERNAL MEDICINE

## 2019-11-19 PROCEDURE — 99152 MOD SED SAME PHYS/QHP 5/>YRS: CPT

## 2019-11-19 PROCEDURE — 93325 DOPPLER ECHO COLOR FLOW MAPG: CPT

## 2019-11-19 PROCEDURE — 93321 DOPPLER ECHO F-UP/LMTD STD: CPT | Performed by: INTERNAL MEDICINE

## 2019-11-19 PROCEDURE — 93312 ECHO TRANSESOPHAGEAL: CPT

## 2019-11-19 PROCEDURE — 25010000002 MIDAZOLAM PER 1 MG: Performed by: INTERNAL MEDICINE

## 2019-11-19 PROCEDURE — 93321 DOPPLER ECHO F-UP/LMTD STD: CPT

## 2019-11-19 RX ORDER — MIDAZOLAM HYDROCHLORIDE 1 MG/ML
INJECTION INTRAMUSCULAR; INTRAVENOUS
Status: COMPLETED | OUTPATIENT
Start: 2019-11-19 | End: 2019-11-19

## 2019-11-19 RX ORDER — FENTANYL CITRATE 50 UG/ML
INJECTION, SOLUTION INTRAMUSCULAR; INTRAVENOUS
Status: COMPLETED | OUTPATIENT
Start: 2019-11-19 | End: 2019-11-19

## 2019-11-19 RX ADMIN — METHOHEXITAL SODIUM 10 MG: 500 INJECTION, POWDER, LYOPHILIZED, FOR SOLUTION INTRAMUSCULAR; INTRAVENOUS; RECTAL at 15:19

## 2019-11-19 RX ADMIN — METHOHEXITAL SODIUM 20 MG: 500 INJECTION, POWDER, LYOPHILIZED, FOR SOLUTION INTRAMUSCULAR; INTRAVENOUS; RECTAL at 15:07

## 2019-11-19 RX ADMIN — MIDAZOLAM HYDROCHLORIDE 1 MG: 1 INJECTION, SOLUTION INTRAMUSCULAR; INTRAVENOUS at 15:11

## 2019-11-19 RX ADMIN — FENTANYL CITRATE 25 MCG: 50 INJECTION, SOLUTION INTRAMUSCULAR; INTRAVENOUS at 15:11

## 2019-11-19 RX ADMIN — MIDAZOLAM HYDROCHLORIDE 2 MG: 1 INJECTION, SOLUTION INTRAMUSCULAR; INTRAVENOUS at 15:03

## 2019-11-19 RX ADMIN — FENTANYL CITRATE 50 MCG: 50 INJECTION, SOLUTION INTRAMUSCULAR; INTRAVENOUS at 15:03

## 2019-11-19 NOTE — H&P
Thelma Michael  [id number]  1960   LOS: 0 days   Patient Care Team:  HEALTHCARE PROVIDER:Monet Clark, APRKIA   [facility] CARDIOLOGIST: LYN Damon MD, [facility]  CARDIOLOGIST: Ludwig Mitchell MD, [facility], [facility]    Mrs. Michael is a 53 year old   female from [place], [place].    Chief Complaint:  TIA     PROBLEM LIST:  1. Chest pain  a. GXT Cardiolite stress test, 02/17/2012:Hypertensive response to 242/120 mmHg.Shortness of breath, leg fatigue.Moderately decreased  exercise tolerance.Cardiolite SPECT images:  Negative for ischemia or scar. LVEF 62%.  b. Echocardiogram, 08/07/2012:LVEF 65%.Mild LVH.Borderline  evidence of atrial septal aneurysm.  No PFO.   c. Nuclear treadmill  stress test, 08/22/2014:  Negative for ischemia and scars; LVEF of 77%.    d. Echocardiogram 11/1/19: LVEF >70%, LV diastolic dysfunction consistent with impaired relaxation, LV mild concentric hypertrophy, interatrial septal aneurysm present. Saline test results positive for right to left atrial level shunt  e. Residual class I symptoms  2. Hypertension.  3. Dyslipidemia; on statin therapy.  4. IDDM, Type II;HgbA1C >114% November 2019.  5. Tobacco abuse, now with cessation since her hospitalization November 2019.  6. TIA with negative MRI for infarct, echocardiogram with PFO November 2019    No Known Allergies    (Not in a hospital admission)  Scheduled Meds:  Continuous Infusions:  No current facility-administered medications for this encounter.   PRN Meds:.       History of Present Illness:    This is a 58 year old black female who presents today to have a LIZANDRO after having a recent 3 day [facility] admission for TIA with associated facial drooping and dysarthria. Her MRI was negative for acute infarct. Her echocardiogram showed a PFO but was felt that TIA was not cardioembolic. Her HgbA1C was >14% November 2019. She has had no further weakness, dysarthria, dysphagia, chest pain, increased SOB,  Patient information on fall and injury prevention palpitations, presyncope, or syncope. Occasionally she will have dizziness if she stands up too quickly. She has not worn her heart monitor yet; apparently there is an issue with insurance approving this-data deficit. She has only smoked one cigarette since she was discharged from her hospitalization and has nicotine patches available if needed. Her blood pressure at home is normally around 130/90 and occasionally will be elevated.     Cardiac risk factors: diabetes mellitus, dyslipidemia and hypertension.    Social History     Socioeconomic History   • Marital status:      Spouse name: Not on file   • Number of children: Not on file   • Years of education: Not on file   • Highest education level: Not on file   Tobacco Use   • Smoking status: Former Smoker     Last attempt to quit: 2019     Years since quittin.4   • Smokeless tobacco: Never Used   • Tobacco comment: BC PL never smoker    Substance and Sexual Activity   • Alcohol use: Yes     Alcohol/week: 0.6 oz     Types: 1 Glasses of wine per week     Comment: ocassional   • Drug use: No   • Sexual activity: Defer     Comment: Single      Family History   Problem Relation Age of Onset   • Anxiety disorder Other    • Arthritis Other    • ADD / ADHD Other    • Heart disease Other         cardiac disorder   • Depression Other    • Diabetes Other    • Hyperlipidemia Other    • Hypertension Other    • Lung cancer Other    • Osteoporosis Other        Review of Systems  10 point review of systems was completed, positives outlined in the HPI, and otherwise all other systems are negative.      Objective:       Physical Exam  /93   Pulse 81   SpO2 100%   There were no vitals filed for this visit.  There is no height or weight on file to calculate BMI.  No intake or output data in the 24 hours ending 19 1355  RR 16  General Appearance:  Alert, cooperative, no distress, appears stated age   Head:  Normocephalic, without obvious abnormality,  atraumatic   Neck: Supple, symmetrical, trachea midline, no adenopathy, thyroid: not enlarged, symmetric, no tenderness/mass/nodules, no carotid bruit or JVD   Lungs:   Clear to auscultation bilaterally, respirations unlabored   Heart:  Regular rate and rhythm, S1, S2 normal, no murmur, rub or gallop   Abdomen:   Soft, non-tender, no masses, no organomegaly, bowel sounds audible x4   Extremities: No edema, normal range of motion   Pulses: 2+ and symmetric   Skin: Skin color, texture, turgor normal, no rashes or lesions   Neurologic: Normal       Cardiographics  EKG:10/31/19:Normal sinus rhythm  Minimal voltage criteria for LVH, may be normal variant  Borderline ECG  When compared with ECG of 12-NOV-2017 15:08,  No significant change was found  Confirmed by VARSHA GONZALES MD ([id number]) on 11/1/2019 10:18:12 AM  Echocardiogram 11/1/19:  · Left ventricular systolic function is hyperdynamic (EF > 70).  · Left ventricular diastolic dysfunction (grade I) consistent with impaired relaxation.  · Left ventricular wall thickness is consistent with mild concentric hypertrophy.  · Interatrial septal aneurysm present. Saline test results are positive for right to left atrial level shunt.  Imaging  Chest x-ray:No new studies to review  MRI brain 11/1/19:  There is a tiny, punctate area of abnormal signal on the  diffusion images just lateral to the right lateral ventricle. This does  not, however, demonstrate distinct restricted diffusion and therefore  does not fulfill the criteria for the diagnosis of acute punctate  infarct. Otherwise there are extensive white matter changes consistent  with aging  CT head 10/31/19:  1. No clearly acute intracranial pathology demonstrated.  2. Mild cerebral atrophy and prominent periventricular hypodensities which are thought to represent white matter microvascular change.  3. Report called to Lobito Martinez at 7:43 PM, Thursday, October 31, 2019.  CTA head/neck11/1/19:  No evidence of  flow-limiting stenosis or aneurysm  CT cerebral perfusion 11/1/19:  Normal brain perfusion CT  Lab Review : No new labs to review                          Assessment:    Patient with recent 3 day hospitalization for TIA with abnormal echocardiogram demonstrating a PFO.LIZANDRO procedure, risks, complications discussed with patient and she is agreeable to proceed. She will need a MCOT before being discharged today after her LIZANDRO. I am pleased that the patient continues to have tobacco cessation.    The patient has uncontrolled T2DM with last A1C >14% and needs better glycemic control.      Plan:   1. LIZANDRO  2. Encouraged continued tobacco cessation  3. MCOT before she is discharged today  4. Patient needs better glycemic control    Electronically signed by HOWIE Burciaga, 11/19/19, 1:58 PM.

## 2019-11-20 ENCOUNTER — READMISSION MANAGEMENT (OUTPATIENT)
Dept: CALL CENTER | Facility: HOSPITAL | Age: 59
End: 2019-11-20

## 2019-11-20 NOTE — OUTREACH NOTE
Stroke Week 3 Survey      Responses   Facility patient discharged from?  [place]   Does the patient have one of the following disease processes/diagnoses(primary or secondary)?  Stroke (TIA)   Week 3 attempt successful?  No   Unsuccessful attempts  Attempt 1          Kerri Griffith RN

## 2019-11-21 ENCOUNTER — READMISSION MANAGEMENT (OUTPATIENT)
Dept: CALL CENTER | Facility: HOSPITAL | Age: 59
End: 2019-11-21

## 2019-11-21 ENCOUNTER — TELEPHONE (OUTPATIENT)
Dept: CARDIOLOGY | Facility: CLINIC | Age: 59
End: 2019-11-21

## 2019-11-21 NOTE — TELEPHONE ENCOUNTER
----- Message from Ludwig Mitchell MD sent at 11/19/2019  4:14 PM EST -----  Can you call Ms Michael in a week about her BP? Also, she's the one without insurance. Apparently applying for financial aid for her recent hospitalization, which may or may not cover the heart monitor. I told her you'd look into it. Thanks.

## 2019-11-21 NOTE — OUTREACH NOTE
Stroke Week 3 Survey      Responses   Facility patient discharged from?  [place]   Does the patient have one of the following disease processes/diagnoses(primary or secondary)?  Stroke (TIA)   Week 3 attempt successful?  No   Unsuccessful attempts  Attempt 2          Marni Stephens RN

## 2019-12-04 ENCOUNTER — TELEPHONE (OUTPATIENT)
Dept: INTERNAL MEDICINE | Facility: CLINIC | Age: 59
End: 2019-12-04

## 2019-12-05 DIAGNOSIS — G89.29 CHRONIC MIDLINE LOW BACK PAIN WITHOUT SCIATICA: ICD-10-CM

## 2019-12-05 DIAGNOSIS — M54.50 CHRONIC MIDLINE LOW BACK PAIN WITHOUT SCIATICA: ICD-10-CM

## 2019-12-05 NOTE — TELEPHONE ENCOUNTER
Can you ask her what kind of pain she is having? I gave her some tramadol once in June for back pain but haven't see her since August.

## 2019-12-09 ENCOUNTER — TELEPHONE (OUTPATIENT)
Dept: INTERNAL MEDICINE | Facility: CLINIC | Age: 59
End: 2019-12-09

## 2019-12-09 NOTE — TELEPHONE ENCOUNTER
PLEASE CALL [facility] [phone]. SHE NEEDS A NEW PRESCRIPTION OF TRAMADOL #45 THE PRESCRIPTION WAS OLD. THANKS

## 2019-12-09 NOTE — TELEPHONE ENCOUNTER
Spoke with pharmacy and notified them was unable to reach patient to discuss pain. And notified them we havent seen her since August.

## 2019-12-09 NOTE — TELEPHONE ENCOUNTER
Tried contacting patient and the phone number provided is not a working number. Called other number listed on the chart and was unable to leave voicemail.

## 2020-03-30 ENCOUNTER — TELEPHONE (OUTPATIENT)
Dept: INTERNAL MEDICINE | Facility: CLINIC | Age: 60
End: 2020-03-30

## 2020-03-30 DIAGNOSIS — E11.42 DIABETIC PERIPHERAL NEUROPATHY (HCC): ICD-10-CM

## 2020-03-30 RX ORDER — BLOOD-GLUCOSE METER
1 KIT MISCELLANEOUS AS NEEDED
Qty: 1 EACH | Refills: 0 | Status: SHIPPED | OUTPATIENT
Start: 2020-03-30 | End: 2021-07-15

## 2020-03-30 RX ORDER — GABAPENTIN 400 MG/1
400 CAPSULE ORAL 3 TIMES DAILY
Qty: 90 CAPSULE | Refills: 0 | Status: SHIPPED | OUTPATIENT
Start: 2020-03-30 | End: 2020-05-29 | Stop reason: SDUPTHER

## 2020-03-30 RX ORDER — ALBUTEROL SULFATE 90 UG/1
2 AEROSOL, METERED RESPIRATORY (INHALATION) EVERY 4 HOURS PRN
Qty: 18 G | Refills: 5 | Status: SHIPPED | OUTPATIENT
Start: 2020-03-30 | End: 2023-01-05 | Stop reason: SDUPTHER

## 2020-03-30 RX ORDER — LANCETS 30 GAUGE
EACH MISCELLANEOUS
Qty: 100 EACH | Refills: 5 | Status: SHIPPED | OUTPATIENT
Start: 2020-03-30 | End: 2021-03-09 | Stop reason: SDUPTHER

## 2020-04-02 ENCOUNTER — OFFICE VISIT (OUTPATIENT)
Dept: INTERNAL MEDICINE | Facility: CLINIC | Age: 60
End: 2020-04-02

## 2020-04-02 DIAGNOSIS — K21.9 GASTROESOPHAGEAL REFLUX DISEASE, ESOPHAGITIS PRESENCE NOT SPECIFIED: ICD-10-CM

## 2020-04-02 DIAGNOSIS — E13.9 DIABETES 1.5, MANAGED AS TYPE 2 (HCC): Primary | ICD-10-CM

## 2020-04-02 DIAGNOSIS — I10 ESSENTIAL HYPERTENSION: ICD-10-CM

## 2020-04-02 DIAGNOSIS — M54.50 CHRONIC MIDLINE LOW BACK PAIN WITHOUT SCIATICA: ICD-10-CM

## 2020-04-02 DIAGNOSIS — G89.29 CHRONIC MIDLINE LOW BACK PAIN WITHOUT SCIATICA: ICD-10-CM

## 2020-04-02 DIAGNOSIS — E11.42 DIABETIC PERIPHERAL NEUROPATHY (HCC): ICD-10-CM

## 2020-04-02 PROCEDURE — 99214 OFFICE O/P EST MOD 30 MIN: CPT | Performed by: NURSE PRACTITIONER

## 2020-04-02 RX ORDER — PHENOL 1.4 %
600 AEROSOL, SPRAY (ML) MUCOUS MEMBRANE DAILY
Qty: 30 TABLET | Refills: 11 | Status: SHIPPED | OUTPATIENT
Start: 2020-04-02 | End: 2020-04-16 | Stop reason: HOSPADM

## 2020-04-02 RX ORDER — DOXAZOSIN MESYLATE 1 MG/1
1 TABLET ORAL NIGHTLY
Qty: 30 TABLET | Refills: 2 | Status: SHIPPED | OUTPATIENT
Start: 2020-04-02 | End: 2020-04-07 | Stop reason: SDUPTHER

## 2020-04-02 RX ORDER — VITAMIN B COMPLEX
1 CAPSULE ORAL DAILY
Qty: 30 CAPSULE | Refills: 11 | Status: ON HOLD | OUTPATIENT
Start: 2020-04-02 | End: 2020-05-25

## 2020-04-02 RX ORDER — PANTOPRAZOLE SODIUM 40 MG/1
40 TABLET, DELAYED RELEASE ORAL DAILY
Qty: 30 TABLET | Refills: 11 | Status: SHIPPED | OUTPATIENT
Start: 2020-04-02 | End: 2020-05-29 | Stop reason: SDUPTHER

## 2020-04-02 RX ORDER — TRAMADOL HYDROCHLORIDE 50 MG/1
50 TABLET ORAL 2 TIMES DAILY PRN
Qty: 28 TABLET | Refills: 0 | Status: SHIPPED | OUTPATIENT
Start: 2020-04-02 | End: 2020-04-14 | Stop reason: SDUPTHER

## 2020-04-02 NOTE — PROGRESS NOTES
Subjective   Chief Complaint   Patient presents with   • Diabetes     follow up       This is Telephone visit.  You have chosen to receive care through a telephone visit today. Do you consent to use a telephone visit for your medical care today? Yes    Thelma Michael is a 59 y.o. female here today for follow up on diabetes, hypertension, GERD, and back pain. She was last seen in office on 8/22 and has history of noncompliance with her medications. Diabetes and hypertension has been uncontrolled in the past. She reports losing her insurance after our last appt and has been going to a free clinic. She has been taking insulin 70/30 and humalog sliding scale daily and reports her blood sugars were well controlled with this but she has run out of script and has been taking 1/2 dose to get by. States blood sugars have been high but unsure of exact numbers and doesn't seem to check these much. Reviewed all of her medications and she reports taking these as prescribed and blood pressure has been doing better than in the past. Discussed the importance of getting patient into the office for updated blood work and blood pressure check after COVID pandemic improves. She is agreeable to this. She has GERD and IBS diarrhea. She has increased heartburn and has not been taking protonix. She has some intermittent diarrhea and abdominal cramping. She continues to have low back pain and struggles with decreased range of motion. Neuropathy in her feet has been severe. She has been taking gabapentin that helps some. Tramadol helped with her pain in the past and she is wanting refill. Discussed with patient that she needs updated xray and work up for this when pandemic improves. She's been off work for about 2 weeks and has been resting at home. She is practicing social distancing and minimizing exposure to COVID. She still has anxiety but is overall sleep well. No shortness of breath or chest pain.     I have reviewed the  following portions of the patient's history and confirmed they are accurate: allergies, current medications, past family history, past medical history, past social history, past surgical history and problem list    I have personally completed the patient's review of systems.    Review of Systems   Constitutional: Positive for fatigue. Negative for activity change, appetite change, chills, diaphoresis, fever, unexpected weight gain and unexpected weight loss.   HENT: Negative for ear discharge, ear pain, mouth sores, nosebleeds, sinus pressure, sneezing and sore throat.    Eyes: Negative for pain, discharge and itching.   Respiratory: Negative for cough, chest tightness, shortness of breath and wheezing.    Cardiovascular: Negative for chest pain and palpitations.   Gastrointestinal: Positive for abdominal pain, diarrhea, GERD and indigestion. Negative for nausea and vomiting.   Endocrine: Negative for heat intolerance, polydipsia and polyphagia.   Genitourinary: Negative for dysuria, flank pain, frequency, hematuria and urgency.   Musculoskeletal: Positive for arthralgias, back pain and myalgias. Negative for gait problem, joint swelling, neck pain and neck stiffness.   Skin: Negative.  Negative for color change, pallor and rash.   Allergic/Immunologic: Negative for immunocompromised state.   Neurological: Positive for numbness and memory problem. Negative for seizures, speech difficulty, headache and confusion.   Hematological: Negative for adenopathy.   Psychiatric/Behavioral: Negative for agitation, decreased concentration, sleep disturbance and depressed mood. The patient is nervous/anxious.        No current facility-administered medications on file prior to visit.      Current Outpatient Medications on File Prior to Visit   Medication Sig   • albuterol sulfate  (90 Base) MCG/ACT inhaler Inhale 2 puffs Every 4 (Four) Hours As Needed for Wheezing.   • amLODIPine (NORVASC) 5 MG tablet Take 1 tablet by  mouth Daily.   • Ascorbic Acid (VITAMIN C PO) Vitamin C TABS   • aspirin 81 MG tablet Take 1 tablet by mouth Daily.   • Blood Glucose Monitoring Suppl (FREESTYLE FREEDOM LITE) w/Device kit 1 kit 3 (Three) Times a Day. TEST; For: Diabetic hypoglycemia, Diabetic peripheral neuropathy   • carbamide peroxide (DEBROX) 6.5 % otic solution Administer 5 drops into both ears 2 (Two) Times a Day. Use as directed; For: Impacted cerumen of both ears   • Cyanocobalamin (VITAMIN B12 PO) Vitamin B12 TABS   • dicyclomine (BENTYL) 20 MG tablet Take 1 tablet by mouth Every 6 (Six) Hours As Needed (diarrhea).   • gabapentin (NEURONTIN) 400 MG capsule Take 1 capsule by mouth 3 (Three) Times a Day.   • glucagon (GLUCAGON EMERGENCY) 1 MG injection Use as directed; For: Diabetics mellitus   • glucose blood (Glucose Meter Test) test strip Use as instructed to check blood glucose levels 3 times daily   • glucose monitor monitoring kit 1 each 3 (Three) Times a Day. One touch glucometer   • glucose monitor monitoring kit 1 each As Needed (Check blood sugars 3 times daily.).   • hydrochlorothiazide (MICROZIDE) 12.5 MG capsule Take 1 capsule by mouth Daily.   • Lancets misc Use as instructed to test blood glucose levels 3 times daily.   • lisinopril (PRINIVIL,ZESTRIL) 40 MG tablet Take 1 tablet by mouth Daily.       Objective   There were no vitals filed for this visit.  There is no height or weight on file to calculate BMI.    Physical Exam   Constitutional: She is oriented to person, place, and time.   Pulmonary/Chest: No respiratory distress.   Musculoskeletal:        Lumbar back: She exhibits decreased range of motion and tenderness.   Patient guided, provider directed exam. Tenderness in lower back upon palpation. Decreased range of motion with twisting at waist and bending forward.    Neurological: She is alert and oriented to person, place, and time.   Psychiatric: She has a normal mood and affect. Her speech is normal. Thought content  normal. Cognition and memory are normal.   Memory problems related to prior TIA but sounds well today with normal cognition.        Assessment/Plan   Thelma was seen today for diabetes.    Diagnoses and all orders for this visit:    Diabetes 1.5, managed as type 2 (CMS/[facility])  Chronic issue unstable requiring medication management and monitoring. Will eat well balanced heart healthy diabetic diet, drink adequate water, increase physical activity, and get adequate rest. Will monitor blood sugars daily and keep log for next appt. Will contact office earlier if blood sugars are averaging above 250.   Emphasized the importance of her checking her blood sugar multiple times during the day.  She will schedule an office appointment for blood work as soon as COVID pandemic improves and her risk of exposure is less.  -     Insulin NPH Isophane & Regular (70-30) 100 UNIT/ML suspension pen-injector; Inject 12 Units under the skin into the appropriate area as directed 2 (Two) Times a Day With Meals.  -     Insulin Pen Needle (BD Pen Needle Cydney U/F) 32G X 4 MM misc; Take 1 each by mouth 4 (Four) Times a Day.  -     Insulin Lispro, 1 Unit Dial, (HUMALOG) 100 UNIT/ML solution pen-injector; Inject 0-14 units under skin into appropriate area based upon sliding scale 3 times daily with meals as needed. Do not exceed 40 units per day.    Essential hypertension  Chronic issue unstable requiring medication management and monitoring. Will eat well balanced heart healthy diet, drink adequate water, increase physical activity, and get adequate rest. Monitor blood pressures daily and contact office for any readings consistently above 140/90. Patient will report any associated symptoms such as headaches, blurry vision, or nausea. Patient will go to ER for any chest pressure or chest pain.  She will schedule an office appointment for blood pressure check as soon as COVID pandemic improves  Continue Lisinopril, amlodipine, HCTZ, and  doxazosin  -     doxazosin (CARDURA) 1 MG tablet; Take 1 tablet by mouth Every Night.    Gastroesophageal reflux disease, esophagitis presence not specified  Chronic issue unstable requiring medication management and monitoring. Will eat well balanced diet refraining from spicy and acidic foods, increase water intake refraining from soda/caffeine and alcohol, increase physical activity, and get adequate rest.   -     pantoprazole (PROTONIX) 40 MG EC tablet; Take 1 tablet by mouth Daily.    Chronic midline low back pain without sciatica  Chronic issue unstable requiring medication management and monitoring. Will eat well balanced diet, increase water intake, increase physical activity as tolerated, and get adequate rest. Can use warmth or ice for discomfort in this area. Discussed stretching exercises.   Continue gabapentin. Discussed getting updated xray and possible MRI when pandemic improves. Also discussed PT referral in the future.   -     traMADol (ULTRAM) 50 MG tablet; Take 1 tablet by mouth 2 (Two) Times a Day As Needed for Moderate Pain .    Diabetic peripheral neuropathy (CMS/HCC)  Chronic issue unstable requiring medication management and monitoring. Will eat well balanced diabetic diet, increase water intake, increase physical activity as tolerated, and get adequate rest. Discussed stretching exercises.  Discussed importance of keeping blood sugar well controlled in order to improve neuropathy.  -     Discontinue: traMADol (ULTRAM) 50 MG tablet; Take 1 tablet by mouth 2 (Two) Times a Day As Needed for Moderate Pain .       No current facility-administered medications for this visit.   No current outpatient medications on file.    Facility-Administered Medications Ordered in Other Visits:   •  amLODIPine (NORVASC) tablet 5 mg, 5 mg, Oral, Q24H, Nia Hyatt PA-C, 5 mg at 04/15/20 0834  •  aspirin chewable tablet 81 mg, 81 mg, Oral, Daily, Nia Hyatt PA-C, 81 mg at 04/15/20 0834  •  atorvastatin  (LIPITOR) tablet 80 mg, 80 mg, Oral, Nightly, Nia Hyatt PA-C, 80 mg at 04/15/20 0317  •  bisacodyl (DULCOLAX) suppository 10 mg, 10 mg, Rectal, Daily PRN, Nia Hyatt PA-C  •  [START ON 4/16/2020] cefTRIAXone (ROCEPHIN) 1 g/100 mL 0.9% NS (MBP), 1 g, Intravenous, Q24H, Nia Hyatt PA-C  •  dextrose (D50W) 25 g/ 50mL Intravenous Solution 25 g, 25 g, Intravenous, Q15 Min PRN, Nia Hyatt PA-C  •  dextrose (GLUTOSE) oral gel 15 g, 15 g, Oral, Q15 Min PRN, Nia Hyatt PA-C  •  glucagon (human recombinant) (GLUCAGEN DIAGNOSTIC) injection 1 mg, 1 mg, Subcutaneous, Q15 Min PRN, Nia Hyatt PA-C  •  heparin (porcine) 5000 UNIT/ML injection 5,000 Units, 5,000 Units, Subcutaneous, Q12H, Nia Hyatt PA-C, 5,000 Units at 04/15/20 0531  •  hydrALAZINE (APRESOLINE) injection 10 mg, 10 mg, Intravenous, Once PRN, Nia Hyatt PA-C  •  insulin lispro (humaLOG) injection 0-7 Units, 0-7 Units, Subcutaneous, Q3H, Nia Hyatt PA-C, 3 Units at 04/15/20 0834  •  magnesium hydroxide (MILK OF MAGNESIA) suspension 2400 mg/10mL 10 mL, 10 mL, Oral, Daily PRN, Nia Hyatt PA-C  •  nicotine (NICODERM CQ) 7 MG/24HR patch 1 patch, 1 patch, Transdermal, Q24H, Nia Hyatt PA-C, 1 patch at 04/15/20 0317  •  ondansetron (ZOFRAN) tablet 4 mg, 4 mg, Oral, Q6H PRN **OR** ondansetron (ZOFRAN) injection 4 mg, 4 mg, Intravenous, Q6H PRN, Nia Hyatt PA-C  •  pantoprazole (PROTONIX) EC tablet 40 mg, 40 mg, Oral, Daily, Nia Hyatt PA-C, 40 mg at 04/15/20 0531  •  sennosides-docusate (PERICOLACE) 8.6-50 MG per tablet 2 tablet, 2 tablet, Oral, BID PRN, Nia Hyatt PA-C  •  sodium chloride 0.9 % flush 10 mL, 10 mL, Intravenous, PRN, Nia Hyatt PA-C  •  sodium chloride 0.9 % flush 10 mL, 10 mL, Intravenous, Q12H, Nia Hyatt PA-C, 10 mL at 04/15/20 0835  •  sodium chloride 0.9 % flush 10 mL, 10 mL, Intravenous, PRN, Nia Hyatt PA-C  •  sodium chloride 0.9 % infusion, 100  mL/hr, Intravenous, Continuous, Nia Hyatt PA-C, Last Rate: 100 mL/hr at 04/15/20 0420, 100 mL/hr at 04/15/20 0420  •  terazosin (HYTRIN) capsule 1 mg, 1 mg, Oral, Nightly, Nia Hyatt PA-C, 1 mg at 04/15/20 0317       Plan of care reviewed with the patient at the conclusion of today's visit.  Education was provided regarding diagnosis, management, and any prescribed or recommended OTC medications.  Patient verbalized understanding of and agreement with management plan.     Return in about 2 months (around 6/2/2020), or if symptoms worsen or fail to improve.     I spent  30 minutes in medical discussion with patient during this visit.     Monet Chau, HOWIE    Please note that portions of this note were completed with a voice recognition program. Efforts were made to edit the dictations, but occasionally words are mistranscribed.

## 2020-04-03 RX ORDER — INSULIN LISPRO 100 [IU]/ML
INJECTION, SOLUTION INTRAVENOUS; SUBCUTANEOUS
Qty: 4 PEN | Refills: 5 | Status: SHIPPED | OUTPATIENT
Start: 2020-04-03 | End: 2020-04-13

## 2020-04-07 ENCOUNTER — TELEPHONE (OUTPATIENT)
Dept: INTERNAL MEDICINE | Facility: CLINIC | Age: 60
End: 2020-04-07

## 2020-04-07 DIAGNOSIS — E11.42 DIABETIC PERIPHERAL NEUROPATHY (HCC): ICD-10-CM

## 2020-04-07 DIAGNOSIS — G89.29 CHRONIC MIDLINE LOW BACK PAIN WITHOUT SCIATICA: ICD-10-CM

## 2020-04-07 DIAGNOSIS — M54.50 CHRONIC MIDLINE LOW BACK PAIN WITHOUT SCIATICA: ICD-10-CM

## 2020-04-07 RX ORDER — TRAMADOL HYDROCHLORIDE 50 MG/1
50 TABLET ORAL 2 TIMES DAILY PRN
Qty: 28 TABLET | Refills: 0 | Status: CANCELLED | OUTPATIENT
Start: 2020-04-07

## 2020-04-07 RX ORDER — ATORVASTATIN CALCIUM 80 MG/1
80 TABLET, FILM COATED ORAL NIGHTLY
Qty: 30 TABLET | Refills: 0 | Status: SHIPPED | OUTPATIENT
Start: 2020-04-07 | End: 2020-04-13 | Stop reason: SDUPTHER

## 2020-04-07 RX ORDER — DOXAZOSIN MESYLATE 1 MG/1
1 TABLET ORAL NIGHTLY
Qty: 30 TABLET | Refills: 2 | Status: SHIPPED | OUTPATIENT
Start: 2020-04-07 | End: 2020-04-14 | Stop reason: SDUPTHER

## 2020-04-07 NOTE — TELEPHONE ENCOUNTER
Let patient know that the Lipitor (atorvastatin) is for cholesterol and to lower her risk of cardiovascular disease. She was started on this in October when she was in the ER for stroke symptoms. I have in my notes that she stopped taking pravastatin in June 2019. Tell her to stop the pravastatin and only take Lipitor (atorvastatin). The tramadol I called in on 4/2 after speaking with her and she asked me to call this in for her back pain. The doxazosin is for blood pressure and I refilled this on 4/2 after speaking with her. Ask her if she is still taking Lisinopril or Amlodipine for blood pressure? When I spoke with her she had been going to a free clinic due to not having insurance and my understanding was she had refills of these. I'm concerned about her not taking her meds correctly. Please let me know if she has any questions or we can do anything to help her with this.

## 2020-04-07 NOTE — TELEPHONE ENCOUNTER
Pt called because she has some medications that she doesn't know what they are for     atorvastatin (LIPITOR) 80 MG tablet    traMADol (ULTRAM) 50 MG tablet    doxazosin (CARDURA) 1 MG tablet    Pt states that she was also taking Pravastatin       Please call to adv   [phone]

## 2020-04-08 ENCOUNTER — TELEPHONE (OUTPATIENT)
Dept: INTERNAL MEDICINE | Facility: CLINIC | Age: 60
End: 2020-04-08

## 2020-04-08 NOTE — TELEPHONE ENCOUNTER
Nice pharmacist on phone and she tried running both and none of them work. So we need to change or do PA

## 2020-04-08 NOTE — TELEPHONE ENCOUNTER
Please call pharmacy and ask if they can replace this with admelog. Most pharmacies will go head and do this. When I try to put order in for admelog it always sends it though has humalog. If they won't do this then I can resend the script and put a note in the pharmacy instructions. Humalog and Admelog are the exact same med but just different name. Thanks.

## 2020-04-08 NOTE — TELEPHONE ENCOUNTER
PT CALLED AND STATED THAT THE PHARMACY SAID HER INSURANCE WILL NOT COVER THE     Insulin Lispro, 1 Unit Dial, (HUMALOG) 100 UNIT/ML solution pen-injector    PLEASE CALL AND ADVISE PT. [phone]    [address] [phone] [facility] [phone] FX

## 2020-04-09 NOTE — TELEPHONE ENCOUNTER
Monserrat - can you look for me and see if there is any forms to PA humalog or zabrina from the pharmacy? If not can you call her pharmacy and ask if they can send one. Thanks.

## 2020-04-13 ENCOUNTER — TELEPHONE (OUTPATIENT)
Dept: INTERNAL MEDICINE | Facility: CLINIC | Age: 60
End: 2020-04-13

## 2020-04-13 ENCOUNTER — PRIOR AUTHORIZATION (OUTPATIENT)
Dept: INTERNAL MEDICINE | Facility: CLINIC | Age: 60
End: 2020-04-13

## 2020-04-13 RX ORDER — INSULIN LISPRO 100 U/ML
INJECTION, SOLUTION SUBCUTANEOUS
Qty: 5 PEN | Refills: 5 | Status: SHIPPED | OUTPATIENT
Start: 2020-04-13 | End: 2020-08-03 | Stop reason: SDUPTHER

## 2020-04-13 RX ORDER — ATORVASTATIN CALCIUM 80 MG/1
80 TABLET, FILM COATED ORAL NIGHTLY
Qty: 30 TABLET | Refills: 5 | Status: SHIPPED | OUTPATIENT
Start: 2020-04-13 | End: 2020-05-29 | Stop reason: SDUPTHER

## 2020-04-13 RX ORDER — FLUCONAZOLE 100 MG/1
100 TABLET ORAL DAILY
Qty: 7 TABLET | Refills: 0 | Status: SHIPPED | OUTPATIENT
Start: 2020-04-13 | End: 2020-04-16 | Stop reason: HOSPADM

## 2020-04-13 RX ORDER — NITROFURANTOIN 25; 75 MG/1; MG/1
100 CAPSULE ORAL EVERY 12 HOURS SCHEDULED
Qty: 14 CAPSULE | Refills: 0 | Status: SHIPPED | OUTPATIENT
Start: 2020-04-13 | End: 2020-04-16 | Stop reason: HOSPADM

## 2020-04-13 NOTE — TELEPHONE ENCOUNTER
I resent lipitor and called in script for UTI (macrobid) and yeast (diflucan). We did PA on her humalog on 4/7. I'll have Kassi follow up on this. Thanks.

## 2020-04-13 NOTE — TELEPHONE ENCOUNTER
PT CALLED STATED THAT SHE HAD DROPPED HER cholesterol MEDICATION AND HAD JUST THEM REFILLED, WANTING TO KNOW IF DR CAN SEND FOR ANOTHER REFILL FOR atorvastatin (LIPITOR) 80 MG tablet.  PT ALSO STATED THAT THE INSURANCE WILL NOT COVER RX Insulin Lispro, 1 Unit Dial, (HUMALOG) 100 UNIT/ML solution pen-injector.  PT ALSO BELIEVES SHE HAS A YEAST INFECTION OR BLADDER INFECTION, STATED THAT SHE HAS A HARD TIME URINATING.    PLEASE ADVISE.  CALL BACK:[phone]       [facility] [address] [place] [address]

## 2020-04-14 ENCOUNTER — HOSPITAL ENCOUNTER (INPATIENT)
Facility: HOSPITAL | Age: 60
LOS: 1 days | Discharge: HOME HEALTH | End: 2020-04-16
Attending: EMERGENCY MEDICINE | Admitting: INTERNAL MEDICINE

## 2020-04-14 ENCOUNTER — APPOINTMENT (OUTPATIENT)
Dept: CT IMAGING | Facility: HOSPITAL | Age: 60
End: 2020-04-14

## 2020-04-14 DIAGNOSIS — K59.00 CONSTIPATION, UNSPECIFIED CONSTIPATION TYPE: ICD-10-CM

## 2020-04-14 DIAGNOSIS — R33.8 ACUTE URINARY RETENTION: ICD-10-CM

## 2020-04-14 DIAGNOSIS — N39.0 ACUTE UTI: ICD-10-CM

## 2020-04-14 DIAGNOSIS — R33.9 URINARY RETENTION: Primary | ICD-10-CM

## 2020-04-14 DIAGNOSIS — R73.9 HYPERGLYCEMIA: ICD-10-CM

## 2020-04-14 DIAGNOSIS — G89.29 CHRONIC MIDLINE LOW BACK PAIN WITHOUT SCIATICA: ICD-10-CM

## 2020-04-14 DIAGNOSIS — E11.42 DIABETIC PERIPHERAL NEUROPATHY (HCC): ICD-10-CM

## 2020-04-14 DIAGNOSIS — M54.50 CHRONIC MIDLINE LOW BACK PAIN WITHOUT SCIATICA: ICD-10-CM

## 2020-04-14 DIAGNOSIS — R19.7 DIARRHEA, UNSPECIFIED TYPE: ICD-10-CM

## 2020-04-14 LAB
ALBUMIN SERPL-MCNC: 4.2 G/DL (ref 3.5–5.2)
ALBUMIN/GLOB SERPL: 1.2 G/DL
ALP SERPL-CCNC: 127 U/L (ref 39–117)
ALT SERPL W P-5'-P-CCNC: 23 U/L (ref 1–33)
ANION GAP SERPL CALCULATED.3IONS-SCNC: 16 MMOL/L (ref 5–15)
AST SERPL-CCNC: 24 U/L (ref 1–32)
BACTERIA UR QL AUTO: ABNORMAL /HPF
BASOPHILS # BLD AUTO: 0.05 10*3/MM3 (ref 0–0.2)
BASOPHILS NFR BLD AUTO: 0.5 % (ref 0–1.5)
BILIRUB SERPL-MCNC: 0.4 MG/DL (ref 0.2–1.2)
BILIRUB UR QL STRIP: NEGATIVE
BUN BLD-MCNC: 22 MG/DL (ref 8–23)
BUN/CREAT SERPL: 21.6 (ref 7–25)
CALCIUM SPEC-SCNC: 9.9 MG/DL (ref 8.6–10.5)
CHLORIDE SERPL-SCNC: 94 MMOL/L (ref 98–107)
CLARITY UR: ABNORMAL
CO2 SERPL-SCNC: 28 MMOL/L (ref 22–29)
COLOR UR: YELLOW
CREAT BLD-MCNC: 1.02 MG/DL (ref 0.57–1)
DEPRECATED RDW RBC AUTO: 39.3 FL (ref 37–54)
EOSINOPHIL # BLD AUTO: 0.1 10*3/MM3 (ref 0–0.4)
EOSINOPHIL NFR BLD AUTO: 0.9 % (ref 0.3–6.2)
ERYTHROCYTE [DISTWIDTH] IN BLOOD BY AUTOMATED COUNT: 12.1 % (ref 12.3–15.4)
GFR SERPL CREATININE-BSD FRML MDRD: 67 ML/MIN/1.73
GLOBULIN UR ELPH-MCNC: 3.6 GM/DL
GLUCOSE BLD-MCNC: 458 MG/DL (ref 65–99)
GLUCOSE UR STRIP-MCNC: ABNORMAL MG/DL
HCT VFR BLD AUTO: 43.5 % (ref 34–46.6)
HGB BLD-MCNC: 14.7 G/DL (ref 12–15.9)
HGB UR QL STRIP.AUTO: NEGATIVE
HOLD SPECIMEN: NORMAL
HOLD SPECIMEN: NORMAL
HYALINE CASTS UR QL AUTO: ABNORMAL /LPF
IMM GRANULOCYTES # BLD AUTO: 0.04 10*3/MM3 (ref 0–0.05)
IMM GRANULOCYTES NFR BLD AUTO: 0.4 % (ref 0–0.5)
KETONES UR QL STRIP: NEGATIVE
LEUKOCYTE ESTERASE UR QL STRIP.AUTO: NEGATIVE
LIPASE SERPL-CCNC: 23 U/L (ref 13–60)
LYMPHOCYTES # BLD AUTO: 1.58 10*3/MM3 (ref 0.7–3.1)
LYMPHOCYTES NFR BLD AUTO: 14.9 % (ref 19.6–45.3)
MCH RBC QN AUTO: 30 PG (ref 26.6–33)
MCHC RBC AUTO-ENTMCNC: 33.8 G/DL (ref 31.5–35.7)
MCV RBC AUTO: 88.8 FL (ref 79–97)
MONOCYTES # BLD AUTO: 0.99 10*3/MM3 (ref 0.1–0.9)
MONOCYTES NFR BLD AUTO: 9.4 % (ref 5–12)
NEUTROPHILS # BLD AUTO: 7.82 10*3/MM3 (ref 1.7–7)
NEUTROPHILS NFR BLD AUTO: 73.9 % (ref 42.7–76)
NITRITE UR QL STRIP: NEGATIVE
NRBC BLD AUTO-RTO: 0 /100 WBC (ref 0–0.2)
PH UR STRIP.AUTO: 6.5 [PH] (ref 5–8)
PLATELET # BLD AUTO: 272 10*3/MM3 (ref 140–450)
PMV BLD AUTO: 11.5 FL (ref 6–12)
POTASSIUM BLD-SCNC: 3.7 MMOL/L (ref 3.5–5.2)
PROT SERPL-MCNC: 7.8 G/DL (ref 6–8.5)
PROT UR QL STRIP: ABNORMAL
RBC # BLD AUTO: 4.9 10*6/MM3 (ref 3.77–5.28)
RBC # UR: ABNORMAL /HPF
REF LAB TEST METHOD: ABNORMAL
SODIUM BLD-SCNC: 138 MMOL/L (ref 136–145)
SP GR UR STRIP: 1.02 (ref 1–1.03)
SQUAMOUS #/AREA URNS HPF: ABNORMAL /HPF
UROBILINOGEN UR QL STRIP: ABNORMAL
WBC NRBC COR # BLD: 10.58 10*3/MM3 (ref 3.4–10.8)
WBC UR QL AUTO: ABNORMAL /HPF
WHOLE BLOOD HOLD SPECIMEN: NORMAL
WHOLE BLOOD HOLD SPECIMEN: NORMAL

## 2020-04-14 PROCEDURE — 80053 COMPREHEN METABOLIC PANEL: CPT | Performed by: EMERGENCY MEDICINE

## 2020-04-14 PROCEDURE — 83690 ASSAY OF LIPASE: CPT | Performed by: EMERGENCY MEDICINE

## 2020-04-14 PROCEDURE — 0T9B70Z DRAINAGE OF BLADDER WITH DRAINAGE DEVICE, VIA NATURAL OR ARTIFICIAL OPENING: ICD-10-PCS | Performed by: EMERGENCY MEDICINE

## 2020-04-14 PROCEDURE — 81001 URINALYSIS AUTO W/SCOPE: CPT | Performed by: EMERGENCY MEDICINE

## 2020-04-14 PROCEDURE — 85025 COMPLETE CBC W/AUTO DIFF WBC: CPT | Performed by: EMERGENCY MEDICINE

## 2020-04-14 PROCEDURE — 82010 KETONE BODYS QUAN: CPT | Performed by: PHYSICIAN ASSISTANT

## 2020-04-14 PROCEDURE — 99284 EMERGENCY DEPT VISIT MOD MDM: CPT

## 2020-04-14 PROCEDURE — 74177 CT ABD & PELVIS W/CONTRAST: CPT

## 2020-04-14 PROCEDURE — 83930 ASSAY OF BLOOD OSMOLALITY: CPT | Performed by: PHYSICIAN ASSISTANT

## 2020-04-14 PROCEDURE — 87086 URINE CULTURE/COLONY COUNT: CPT | Performed by: PHYSICIAN ASSISTANT

## 2020-04-14 RX ORDER — TRAMADOL HYDROCHLORIDE 50 MG/1
50 TABLET ORAL 2 TIMES DAILY PRN
Qty: 28 TABLET | Refills: 0 | Status: SHIPPED | OUTPATIENT
Start: 2020-04-14 | End: 2020-05-29 | Stop reason: SDUPTHER

## 2020-04-14 RX ORDER — SODIUM CHLORIDE 0.9 % (FLUSH) 0.9 %
10 SYRINGE (ML) INJECTION AS NEEDED
Status: DISCONTINUED | OUTPATIENT
Start: 2020-04-14 | End: 2020-04-16 | Stop reason: HOSPADM

## 2020-04-14 RX ORDER — DOXAZOSIN MESYLATE 1 MG/1
1 TABLET ORAL NIGHTLY
Qty: 30 TABLET | Refills: 2 | Status: SHIPPED | OUTPATIENT
Start: 2020-04-14 | End: 2020-05-29 | Stop reason: SDUPTHER

## 2020-04-14 RX ADMIN — SODIUM CHLORIDE 500 ML: 9 INJECTION, SOLUTION INTRAVENOUS at 21:54

## 2020-04-14 NOTE — TELEPHONE ENCOUNTER
Pt called and requested a refill for traMADol (ULTRAM) 50 MG tablet. Pt requested once and did not receive it.    Please send to [facility] on [address]

## 2020-04-14 NOTE — TELEPHONE ENCOUNTER
PT CALLED STATED THAT YESTERDAY SHE HAD CALLED IN AND GAVE THE WRONG RX NAME THAT WAS NEED FOR REFILL. PT WOULD LIKE RX doxazosin (CARDURA) 1 MG tablet REFILLED.    PLEASE ADVISE.  CALL BACK:[phone]       [facility] [address] [place] [address]

## 2020-04-15 PROBLEM — R33.9 URINARY RETENTION: Status: ACTIVE | Noted: 2020-04-15

## 2020-04-15 PROBLEM — E11.65 TYPE 2 DIABETES MELLITUS WITH HYPERGLYCEMIA: Status: ACTIVE | Noted: 2020-04-15

## 2020-04-15 PROBLEM — N17.9 AKI (ACUTE KIDNEY INJURY): Status: ACTIVE | Noted: 2020-04-15

## 2020-04-15 PROBLEM — N13.30 BILATERAL HYDRONEPHROSIS: Status: ACTIVE | Noted: 2020-04-15

## 2020-04-15 PROBLEM — K59.00 CONSTIPATION: Status: ACTIVE | Noted: 2020-04-15

## 2020-04-15 PROBLEM — R33.8 ACUTE URINARY RETENTION: Status: ACTIVE | Noted: 2020-04-15

## 2020-04-15 LAB
ANION GAP SERPL CALCULATED.3IONS-SCNC: 15 MMOL/L (ref 5–15)
ATMOSPHERIC PRESS: ABNORMAL MM[HG]
B-OH-BUTYR SERPL-SCNC: 1.23 MMOL/L (ref 0.02–0.27)
BASE EXCESS BLDV CALC-SCNC: 6.7 MMOL/L (ref -2–2)
BDY SITE: ABNORMAL
BILIRUB UR QL STRIP: NEGATIVE
BODY TEMPERATURE: 37 C
BUN BLD-MCNC: 19 MG/DL (ref 8–23)
BUN/CREAT SERPL: 23.5 (ref 7–25)
CALCIUM SPEC-SCNC: 9.6 MG/DL (ref 8.6–10.5)
CHLORIDE SERPL-SCNC: 100 MMOL/L (ref 98–107)
CLARITY UR: CLEAR
CO2 BLDA-SCNC: 33.8 MMOL/L (ref 22–33)
CO2 SERPL-SCNC: 29 MMOL/L (ref 22–29)
COHGB MFR BLD: 2.6 %
COLOR UR: YELLOW
CREAT BLD-MCNC: 0.81 MG/DL (ref 0.57–1)
DEPRECATED RDW RBC AUTO: 39.7 FL (ref 37–54)
ERYTHROCYTE [DISTWIDTH] IN BLOOD BY AUTOMATED COUNT: 12 % (ref 12.3–15.4)
GFR SERPL CREATININE-BSD FRML MDRD: 87 ML/MIN/1.73
GLUCOSE BLD-MCNC: 370 MG/DL (ref 65–99)
GLUCOSE BLDC GLUCOMTR-MCNC: 132 MG/DL (ref 70–130)
GLUCOSE BLDC GLUCOMTR-MCNC: 207 MG/DL (ref 70–130)
GLUCOSE BLDC GLUCOMTR-MCNC: 220 MG/DL (ref 70–130)
GLUCOSE BLDC GLUCOMTR-MCNC: 233 MG/DL (ref 70–130)
GLUCOSE BLDC GLUCOMTR-MCNC: 238 MG/DL (ref 70–130)
GLUCOSE BLDC GLUCOMTR-MCNC: 276 MG/DL (ref 70–130)
GLUCOSE BLDC GLUCOMTR-MCNC: 335 MG/DL (ref 70–130)
GLUCOSE BLDC GLUCOMTR-MCNC: 336 MG/DL (ref 70–130)
GLUCOSE BLDC GLUCOMTR-MCNC: 353 MG/DL (ref 70–130)
GLUCOSE BLDC GLUCOMTR-MCNC: 410 MG/DL (ref 70–130)
GLUCOSE UR STRIP-MCNC: ABNORMAL MG/DL
HBA1C MFR BLD: 17.1 % (ref 4.8–5.6)
HCO3 BLDV-SCNC: 32.3 MMOL/L (ref 22–28)
HCT VFR BLD AUTO: 40.1 % (ref 34–46.6)
HGB BLD-MCNC: 13.2 G/DL (ref 12–15.9)
HGB BLDA-MCNC: 13.2 G/DL (ref 14–18)
HGB UR QL STRIP.AUTO: NEGATIVE
HOROWITZ INDEX BLD+IHG-RTO: 21 %
KETONES UR QL STRIP: ABNORMAL
LEUKOCYTE ESTERASE UR QL STRIP.AUTO: NEGATIVE
MCH RBC QN AUTO: 29.7 PG (ref 26.6–33)
MCHC RBC AUTO-ENTMCNC: 32.9 G/DL (ref 31.5–35.7)
MCV RBC AUTO: 90.3 FL (ref 79–97)
METHGB BLD QL: 1 %
MODALITY: ABNORMAL
NITRITE UR QL STRIP: NEGATIVE
NOTE: ABNORMAL
OSMOLALITY SERPL: 310 MOSM/KG (ref 275–295)
OXYHGB MFR BLDV: 88.1 %
PCO2 BLDV: 49 MM HG (ref 41–51)
PH BLDV: 7.43 PH UNITS
PH UR STRIP.AUTO: 6.5 [PH] (ref 5–8)
PLATELET # BLD AUTO: 293 10*3/MM3 (ref 140–450)
PMV BLD AUTO: 11.5 FL (ref 6–12)
PO2 BLDV: 56.5 MM HG (ref 27–53)
POTASSIUM BLD-SCNC: 3.4 MMOL/L (ref 3.5–5.2)
PROT UR QL STRIP: NEGATIVE
RBC # BLD AUTO: 4.44 10*6/MM3 (ref 3.77–5.28)
SODIUM BLD-SCNC: 144 MMOL/L (ref 136–145)
SP GR UR STRIP: 1.02 (ref 1–1.03)
UROBILINOGEN UR QL STRIP: ABNORMAL
VENTILATOR MODE: ABNORMAL
WBC NRBC COR # BLD: 9.88 10*3/MM3 (ref 3.4–10.8)

## 2020-04-15 PROCEDURE — 82962 GLUCOSE BLOOD TEST: CPT

## 2020-04-15 PROCEDURE — 82805 BLOOD GASES W/O2 SATURATION: CPT

## 2020-04-15 PROCEDURE — 80048 BASIC METABOLIC PNL TOTAL CA: CPT | Performed by: PHYSICIAN ASSISTANT

## 2020-04-15 PROCEDURE — 82820 HEMOGLOBIN-OXYGEN AFFINITY: CPT

## 2020-04-15 PROCEDURE — 63710000001 INSULIN LISPRO (HUMAN) PER 5 UNITS: Performed by: PHYSICIAN ASSISTANT

## 2020-04-15 PROCEDURE — 25010000002 IOPAMIDOL 61 % SOLUTION: Performed by: EMERGENCY MEDICINE

## 2020-04-15 PROCEDURE — 63710000001 INSULIN REGULAR HUMAN PER 5 UNITS: Performed by: PHYSICIAN ASSISTANT

## 2020-04-15 PROCEDURE — 25010000002 CEFTRIAXONE PER 250 MG: Performed by: PHYSICIAN ASSISTANT

## 2020-04-15 PROCEDURE — 99223 1ST HOSP IP/OBS HIGH 75: CPT | Performed by: INTERNAL MEDICINE

## 2020-04-15 PROCEDURE — 87040 BLOOD CULTURE FOR BACTERIA: CPT | Performed by: PHYSICIAN ASSISTANT

## 2020-04-15 PROCEDURE — 81003 URINALYSIS AUTO W/O SCOPE: CPT | Performed by: PHYSICIAN ASSISTANT

## 2020-04-15 PROCEDURE — 85027 COMPLETE CBC AUTOMATED: CPT | Performed by: PHYSICIAN ASSISTANT

## 2020-04-15 PROCEDURE — 83036 HEMOGLOBIN GLYCOSYLATED A1C: CPT | Performed by: PHYSICIAN ASSISTANT

## 2020-04-15 PROCEDURE — 25010000002 HEPARIN (PORCINE) PER 1000 UNITS: Performed by: PHYSICIAN ASSISTANT

## 2020-04-15 RX ORDER — POTASSIUM CHLORIDE 1.5 G/1.77G
40 POWDER, FOR SOLUTION ORAL AS NEEDED
Status: DISCONTINUED | OUTPATIENT
Start: 2020-04-15 | End: 2020-04-16 | Stop reason: HOSPADM

## 2020-04-15 RX ORDER — POTASSIUM CHLORIDE 750 MG/1
40 CAPSULE, EXTENDED RELEASE ORAL AS NEEDED
Status: DISCONTINUED | OUTPATIENT
Start: 2020-04-15 | End: 2020-04-16 | Stop reason: HOSPADM

## 2020-04-15 RX ORDER — NICOTINE POLACRILEX 4 MG
15 LOZENGE BUCCAL
Status: DISCONTINUED | OUTPATIENT
Start: 2020-04-15 | End: 2020-04-16 | Stop reason: HOSPADM

## 2020-04-15 RX ORDER — MAGNESIUM SULFATE HEPTAHYDRATE 40 MG/ML
2 INJECTION, SOLUTION INTRAVENOUS AS NEEDED
Status: DISCONTINUED | OUTPATIENT
Start: 2020-04-15 | End: 2020-04-16 | Stop reason: HOSPADM

## 2020-04-15 RX ORDER — HYDRALAZINE HYDROCHLORIDE 20 MG/ML
10 INJECTION INTRAMUSCULAR; INTRAVENOUS ONCE AS NEEDED
Status: DISCONTINUED | OUTPATIENT
Start: 2020-04-15 | End: 2020-04-16 | Stop reason: HOSPADM

## 2020-04-15 RX ORDER — DEXTROSE MONOHYDRATE 25 G/50ML
25 INJECTION, SOLUTION INTRAVENOUS
Status: DISCONTINUED | OUTPATIENT
Start: 2020-04-15 | End: 2020-04-16 | Stop reason: HOSPADM

## 2020-04-15 RX ORDER — ASPIRIN 81 MG/1
81 TABLET, CHEWABLE ORAL DAILY
Status: DISCONTINUED | OUTPATIENT
Start: 2020-04-15 | End: 2020-04-16 | Stop reason: HOSPADM

## 2020-04-15 RX ORDER — HEPARIN SODIUM 5000 [USP'U]/ML
5000 INJECTION, SOLUTION INTRAVENOUS; SUBCUTANEOUS EVERY 12 HOURS SCHEDULED
Status: DISCONTINUED | OUTPATIENT
Start: 2020-04-15 | End: 2020-04-16 | Stop reason: HOSPADM

## 2020-04-15 RX ORDER — AMLODIPINE BESYLATE 5 MG/1
5 TABLET ORAL
Status: DISCONTINUED | OUTPATIENT
Start: 2020-04-15 | End: 2020-04-16 | Stop reason: HOSPADM

## 2020-04-15 RX ORDER — ATORVASTATIN CALCIUM 40 MG/1
80 TABLET, FILM COATED ORAL NIGHTLY
Status: DISCONTINUED | OUTPATIENT
Start: 2020-04-15 | End: 2020-04-16 | Stop reason: HOSPADM

## 2020-04-15 RX ORDER — MAGNESIUM SULFATE HEPTAHYDRATE 40 MG/ML
4 INJECTION, SOLUTION INTRAVENOUS AS NEEDED
Status: DISCONTINUED | OUTPATIENT
Start: 2020-04-15 | End: 2020-04-16 | Stop reason: HOSPADM

## 2020-04-15 RX ORDER — PANTOPRAZOLE SODIUM 40 MG/1
40 TABLET, DELAYED RELEASE ORAL DAILY
Status: DISCONTINUED | OUTPATIENT
Start: 2020-04-15 | End: 2020-04-16 | Stop reason: HOSPADM

## 2020-04-15 RX ORDER — SODIUM CHLORIDE 0.9 % (FLUSH) 0.9 %
10 SYRINGE (ML) INJECTION EVERY 12 HOURS SCHEDULED
Status: DISCONTINUED | OUTPATIENT
Start: 2020-04-15 | End: 2020-04-16 | Stop reason: HOSPADM

## 2020-04-15 RX ORDER — POTASSIUM CHLORIDE 7.45 MG/ML
10 INJECTION INTRAVENOUS
Status: DISCONTINUED | OUTPATIENT
Start: 2020-04-15 | End: 2020-04-16 | Stop reason: HOSPADM

## 2020-04-15 RX ORDER — SODIUM CHLORIDE 0.9 % (FLUSH) 0.9 %
10 SYRINGE (ML) INJECTION AS NEEDED
Status: DISCONTINUED | OUTPATIENT
Start: 2020-04-15 | End: 2020-04-16 | Stop reason: HOSPADM

## 2020-04-15 RX ORDER — BISACODYL 10 MG
10 SUPPOSITORY, RECTAL RECTAL DAILY PRN
Status: DISCONTINUED | OUTPATIENT
Start: 2020-04-15 | End: 2020-04-16 | Stop reason: HOSPADM

## 2020-04-15 RX ORDER — ONDANSETRON 2 MG/ML
4 INJECTION INTRAMUSCULAR; INTRAVENOUS EVERY 6 HOURS PRN
Status: DISCONTINUED | OUTPATIENT
Start: 2020-04-15 | End: 2020-04-16 | Stop reason: HOSPADM

## 2020-04-15 RX ORDER — SODIUM CHLORIDE 9 MG/ML
100 INJECTION, SOLUTION INTRAVENOUS CONTINUOUS
Status: ACTIVE | OUTPATIENT
Start: 2020-04-15 | End: 2020-04-15

## 2020-04-15 RX ORDER — TERAZOSIN 1 MG/1
1 CAPSULE ORAL NIGHTLY
Status: DISCONTINUED | OUTPATIENT
Start: 2020-04-15 | End: 2020-04-16 | Stop reason: HOSPADM

## 2020-04-15 RX ORDER — ONDANSETRON 4 MG/1
4 TABLET, FILM COATED ORAL EVERY 6 HOURS PRN
Status: DISCONTINUED | OUTPATIENT
Start: 2020-04-15 | End: 2020-04-16 | Stop reason: HOSPADM

## 2020-04-15 RX ORDER — AMOXICILLIN 250 MG
2 CAPSULE ORAL 2 TIMES DAILY PRN
Status: DISCONTINUED | OUTPATIENT
Start: 2020-04-15 | End: 2020-04-16 | Stop reason: HOSPADM

## 2020-04-15 RX ADMIN — SODIUM CHLORIDE, PRESERVATIVE FREE 10 ML: 5 INJECTION INTRAVENOUS at 03:05

## 2020-04-15 RX ADMIN — SODIUM CHLORIDE 500 ML: 9 INJECTION, SOLUTION INTRAVENOUS at 00:24

## 2020-04-15 RX ADMIN — ATORVASTATIN CALCIUM 80 MG: 40 TABLET, FILM COATED ORAL at 03:17

## 2020-04-15 RX ADMIN — TERAZOSIN HYDROCHLORIDE 1 MG: 1 CAPSULE ORAL at 21:19

## 2020-04-15 RX ADMIN — INSULIN LISPRO 3 UNITS: 100 INJECTION, SOLUTION INTRAVENOUS; SUBCUTANEOUS at 21:20

## 2020-04-15 RX ADMIN — HEPARIN SODIUM 5000 UNITS: 5000 INJECTION INTRAVENOUS; SUBCUTANEOUS at 21:19

## 2020-04-15 RX ADMIN — SODIUM CHLORIDE 1 G: 900 INJECTION INTRAVENOUS at 01:17

## 2020-04-15 RX ADMIN — ATORVASTATIN CALCIUM 80 MG: 40 TABLET, FILM COATED ORAL at 21:19

## 2020-04-15 RX ADMIN — INSULIN HUMAN 4 UNITS: 100 INJECTION, SOLUTION PARENTERAL at 01:15

## 2020-04-15 RX ADMIN — SODIUM CHLORIDE, PRESERVATIVE FREE 10 ML: 5 INJECTION INTRAVENOUS at 21:20

## 2020-04-15 RX ADMIN — TERAZOSIN HYDROCHLORIDE 1 MG: 1 CAPSULE ORAL at 03:17

## 2020-04-15 RX ADMIN — NICOTINE 1 PATCH: 7 PATCH, EXTENDED RELEASE TRANSDERMAL at 03:17

## 2020-04-15 RX ADMIN — PANTOPRAZOLE SODIUM 40 MG: 40 TABLET, DELAYED RELEASE ORAL at 05:31

## 2020-04-15 RX ADMIN — INSULIN LISPRO 3 UNITS: 100 INJECTION, SOLUTION INTRAVENOUS; SUBCUTANEOUS at 17:25

## 2020-04-15 RX ADMIN — INSULIN LISPRO 6 UNITS: 100 INJECTION, SOLUTION INTRAVENOUS; SUBCUTANEOUS at 04:41

## 2020-04-15 RX ADMIN — INSULIN LISPRO 4 UNITS: 100 INJECTION, SOLUTION INTRAVENOUS; SUBCUTANEOUS at 14:46

## 2020-04-15 RX ADMIN — AMLODIPINE BESYLATE 5 MG: 5 TABLET ORAL at 08:34

## 2020-04-15 RX ADMIN — ASPIRIN 81 MG CHEWABLE TABLET 81 MG: 81 TABLET CHEWABLE at 08:34

## 2020-04-15 RX ADMIN — SODIUM CHLORIDE 75 ML/HR: 9 INJECTION, SOLUTION INTRAVENOUS at 03:03

## 2020-04-15 RX ADMIN — SODIUM CHLORIDE, PRESERVATIVE FREE 10 ML: 5 INJECTION INTRAVENOUS at 08:35

## 2020-04-15 RX ADMIN — INSULIN LISPRO 3 UNITS: 100 INJECTION, SOLUTION INTRAVENOUS; SUBCUTANEOUS at 08:34

## 2020-04-15 RX ADMIN — IOPAMIDOL 70 ML: 612 INJECTION, SOLUTION INTRAVENOUS at 00:01

## 2020-04-15 RX ADMIN — HEPARIN SODIUM 5000 UNITS: 5000 INJECTION INTRAVENOUS; SUBCUTANEOUS at 05:31

## 2020-04-15 NOTE — PAYOR COMM NOTE
"  Yara Reyes RN Utilization Review [phone]  Fax # [phone]      Notification of admission and initial clinicals. Status is inpatient.     Alisa Michael (59 y.o. Female)     Date of Birth Social Security Number Address Home Phone MRN    1960  [address] [place] [address] [phone] [id number]    Amish Marital Status          Mormon        Admission Date Admission Type Admitting Provider Attending Provider Department, Room/Bed    20 Emergency Anay Gu II, Anay Toro II,  [facility] 2F, S205/    Discharge Date Discharge Disposition Discharge Destination                       Attending Provider:  Anay Gu II, DO    Allergies:  No Known Allergies    Isolation:  None   Infection:  None   Code Status:  CPR    Ht:  172.7 cm (68\")   Wt:  57 kg (125 lb 11.2 oz)    Admission Cmt:  None   Principal Problem:  Acute UTI (urinary tract infection) [N39.0]                 Active Insurance as of 2020     Primary Coverage     Payor Plan Insurance Group Employer/Plan Group    [employer] [place] [employer] [place]      Payor Plan Address Payor Plan Phone Number Payor Plan Fax Number Effective Dates    [address] [address] [phone]  2020 - None Entered    [facility] [address]       Subscriber Name Subscriber Birth Date Member ID       ALISA MICHAEL 1960 [id number]                 Emergency Contacts      (Rel.) Home Phone Work Phone Mobile Phone    Keshav Soto (Son) [phone] -- --               History & Physical      Day, Arlin CASTELAN MD at 04/15/20 0157              [facility]  HISTORY AND PHYSICAL    Patient Name: Alisa Michael  : 1960  MRN: [id number]  Primary Care Physician: Monet Howe APRN  Date of admission: 2020      Subjective   Subjective     Chief Complaint:  Abdominal pain     HPI:  Alisa Michael " is a 59 y.o. female with a PMHx of uncontrolled DM, HTN, HLD, and tobacco use presented to [facility] ED c/o lower abdominal pain x 1 week. The patient reports the pain was initially intermittent but became constant over the past 2 days. Today the patient developed nausea but no episodes of vomiting. She admits to associated abdominal distention and constipation. Her last BM was 3 days ago and she reports was normal. She took Doculax at home. She also c/o dysuria for several days. The patient reports she last urinated this morning. She denies hx of kidney/bladder problems or urinary retention. The patient states she lost her insurance and was cutting her insulin in half (70/30 6 units BID) for the last few weeks. She states she now has insurance and has been taking her insulin as prescribed for 2 days. Today her glucose meter was unable to read her sugar for the first time. The patient denies fever, chills, cough, SOB or chest pain. She denies polydipsia or polyuria. She has no other complaints. The patient smokes 5-6 cig/day and smokes marijuana nightly. She drinks alcohol 1x/month. The patient lives with her son.     While in the ED, patient with a temperature of 99.3 and hypertensive.  Labs revealed glucose 458, creatinine 1.02, alk phos 127, anion gap 16, beta hydroxy 1.233.  Urinalysis positive for 2+ bacteria and 3+ glucose.  CT abdomen pelvis shows massive distention of the urinary bladder with bilateral hydronephrosis and hydroureter compatible with bladder outlet obstruction and moderate colonic stool burden with colonic diverticulosis.  The patient received 4 units of regular insulin and was started on Rocephin in the ED.  She will be admitted to the hospital service for further medical management.    Attending HPI;  58 y/o female w/ complaints of abd pain and constipation and difficulty voiding.  Pt states last BM was 3 days ago and was normal.  Has had abd distention, dysuria worsened today.  Dec uop over the  course of hte day.  Has not been taking insulin as prescribed related to losing insurance.  No f/c.  No shortness of breath or cough.    Review of Systems   Constitutional: Negative for chills, diaphoresis, fatigue and fever.   HENT: Negative for congestion, sore throat and trouble swallowing.    Eyes: Negative for pain and visual disturbance.   Respiratory: Negative for cough, shortness of breath and wheezing.    Cardiovascular: Negative for chest pain, palpitations and leg swelling.   Gastrointestinal: Positive for abdominal distention, abdominal pain, constipation and nausea. Negative for blood in stool, diarrhea and vomiting.   Genitourinary: Positive for difficulty urinating and dysuria.   Musculoskeletal: Negative for back pain and gait problem.   Skin: Negative for rash and wound.   Neurological: Negative for dizziness, syncope, speech difficulty, weakness and headaches.   Hematological: Negative for adenopathy. Does not bruise/bleed easily.   Psychiatric/Behavioral: Negative for agitation and confusion.      All other systems reviewed and are negative.     Personal History     Past Medical History:   Diagnosis Date   • Acid reflux    • Acute bronchitis    • Cardiac murmur    • Diabetes mellitus (CMS/[facility])    • H/O echocardiogram 08/07/2012    i. LVEF 65%.ii. Mild LVH.iii. Borderline evidence of atrial septal aneurysm.  No PFO.    • History of nuclear stress test 08/22/2014    Negative for ischemia and scars; LVEF 77%.     • Hyperlipidemia    • Hypertension    • Impacted cerumen of both ears    • Migraine    • Sinusitis    • Tobacco abuse     quit 4 days ago.     • Urticaria        Past Surgical History:   Procedure Laterality Date   • DENTAL PROCEDURE     • NO PAST SURGERIES         Family History: family history includes ADD / ADHD in an other family member; Anxiety disorder in an other family member; Arthritis in an other family member; Depression in an other family member; Diabetes in an other family  member; Heart disease in an other family member; Hyperlipidemia in an other family member; Hypertension in an other family member; Lung cancer in an other family member; Osteoporosis in an other family member. Otherwise pertinent FHx was reviewed and unremarkable.     Social History:  reports that she quit smoking about 10 months ago. She has never used smokeless tobacco. She reports that she drinks about 1.0 standard drinks of alcohol per week. She reports that she does not use drugs.  Social History     Social History Narrative   • Not on file       Medications:  Available home medication information reviewed.  Medications Prior to Admission   Medication Sig Dispense Refill Last Dose   • albuterol sulfate  (90 Base) MCG/ACT inhaler Inhale 2 puffs Every 4 (Four) Hours As Needed for Wheezing. 18 g 5 Taking   • amLODIPine (NORVASC) 5 MG tablet Take 1 tablet by mouth Daily. 30 tablet 0 Taking   • Ascorbic Acid (VITAMIN C PO) Vitamin C TABS   Taking   • aspirin 81 MG tablet Take 1 tablet by mouth Daily. 30 tablet 0 Taking   • atorvastatin (LIPITOR) 80 MG tablet Take 1 tablet by mouth Every Night. 30 tablet 5    • b complex vitamins capsule Take 1 capsule by mouth Daily. 30 capsule 11    • Blood Glucose Monitoring Suppl (FREESTYLE FREEDOM LITE) w/Device kit 1 kit 3 (Three) Times a Day. TEST; For: Diabetic hypoglycemia, Diabetic peripheral neuropathy 1 each 0 Taking   • calcium carbonate (Calcium 600) 600 MG tablet Take 1 tablet by mouth Daily. 30 tablet 11    • carbamide peroxide (DEBROX) 6.5 % otic solution Administer 5 drops into both ears 2 (Two) Times a Day. Use as directed; For: Impacted cerumen of both ears 14.8 mL 5 Taking   • Cyanocobalamin (VITAMIN B12 PO) Vitamin B12 TABS   Taking   • dicyclomine (BENTYL) 20 MG tablet Take 1 tablet by mouth Every 6 (Six) Hours As Needed (diarrhea). 60 tablet 1 Taking   • doxazosin (CARDURA) 1 MG tablet Take 1 tablet by mouth Every Night. 30 tablet 2    • fluconazole  (DIFLUCAN) 100 MG tablet Take 1 tablet by mouth Daily for 7 days. 7 tablet 0    • gabapentin (NEURONTIN) 400 MG capsule Take 1 capsule by mouth 3 (Three) Times a Day. 90 capsule 0 Taking   • glucagon (GLUCAGON EMERGENCY) 1 MG injection Use as directed; For: Diabetics mellitus   Taking   • glucose blood (Glucose Meter Test) test strip Use as instructed to check blood glucose levels 3 times daily 100 each 5 Taking   • glucose monitor monitoring kit 1 each 3 (Three) Times a Day. One touch glucometer 1 each 0 Taking   • glucose monitor monitoring kit 1 each As Needed (Check blood sugars 3 times daily.). 1 each 0 Taking   • hydrochlorothiazide (MICROZIDE) 12.5 MG capsule Take 1 capsule by mouth Daily. 30 capsule 6 Taking   • Insulin Lispro (ADMELOG SOLOSTAR) 100 UNIT/ML injection pen Inject 0-14 units under skin into appropriate area based upon sliding scale 3 times daily with meals as needed. Do not exceed 40 units per day. 5 pen 5    • Insulin NPH Isophane & Regular (70-30) 100 UNIT/ML suspension pen-injector Inject 12 Units under the skin into the appropriate area as directed 2 (Two) Times a Day With Meals. 3 pen 5    • Insulin Pen Needle (BD Pen Needle Cydney U/F) 32G X 4 MM misc Take 1 each by mouth 4 (Four) Times a Day. 100 each 5    • Lancets misc Use as instructed to test blood glucose levels 3 times daily. 100 each 5 Taking   • lisinopril (PRINIVIL,ZESTRIL) 40 MG tablet Take 1 tablet by mouth Daily. 30 tablet 0 Taking   • nitrofurantoin, macrocrystal-monohydrate, (MACROBID) 100 MG capsule Take 1 capsule by mouth Every 12 (Twelve) Hours for 7 days. 14 capsule 0    • pantoprazole (PROTONIX) 40 MG EC tablet Take 1 tablet by mouth Daily. 30 tablet 11    • traMADol (ULTRAM) 50 MG tablet Take 1 tablet by mouth 2 (Two) Times a Day As Needed for Moderate Pain . 28 tablet 0        No Known Allergies    Objective   Objective     Vital Signs:   Temp:  [97.9 °F (36.6 °C)-99.3 °F (37.4 °C)] 97.9 °F (36.6 °C)  Heart Rate:   [] 100  Resp:  [16-20] 18  BP: (171-191)/(81-92) 171/82        Physical Exam   Constitutional: Awake, drowsy, lying in bed   Eyes: PERRLA, sclerae icteric bilaterally, no conjunctival injection  HENT: NCAT, mucous membranes severely dry   Neck: Supple, no thyromegaly, no lymphadenopathy, trachea midline  Respiratory: Clear to auscultation bilaterally, nonlabored respirations   Cardiovascular: RRR, + systolic murmur appreciated, palpable pedal pulses bilaterally  Gastrointestinal: Positive bowel sounds, soft, nontender, no distention or guarding   Musculoskeletal: No bilateral ankle edema, no clubbing or cyanosis to extremities  Psychiatric: Appropriate affect, cooperative  Neurologic: Oriented x 3, strength symmetric in all extremities, Cranial Nerves grossly intact to confrontation, speech clear  Skin: No rashes or wounds    Separate exam performed by me w/ no changes to the above.    Results Reviewed:  I have personally reviewed current lab and radiology data.    Results from last 7 days   Lab Units 04/14/20 2051   WBC 10*3/mm3 10.58   HEMOGLOBIN g/dL 14.7   HEMATOCRIT % 43.5   PLATELETS 10*3/mm3 272     Results from last 7 days   Lab Units 04/14/20  2147   SODIUM mmol/L 138   POTASSIUM mmol/L 3.7   CHLORIDE mmol/L 94*   CO2 mmol/L 28.0   BUN mg/dL 22   CREATININE mg/dL 1.02*   GLUCOSE mg/dL 458*   CALCIUM mg/dL 9.9   ALT (SGPT) U/L 23   AST (SGOT) U/L 24     Estimated Creatinine Clearance: 52.1 mL/min (A) (by C-G formula based on SCr of 1.02 mg/dL (H)).  Brief Urine Lab Results  (Last result in the past 365 days)      Color   Clarity   Blood   Leuk Est   Nitrite   Protein   CREAT   Urine HCG        04/14/20 2052 Yellow Turbid Negative Negative Negative Trace             Imaging Results (Last 24 Hours)     Procedure Component Value Units Date/Time    CT Abdomen Pelvis With Contrast [[id number]] Collected:  04/15/20 0040     Updated:  04/15/20 0042    Narrative:       CT Abdomen Pelvis W    INDICATION:      Abdominal distention with generalized abdominal pain for 2 to 3 days. Constipation.    TECHNIQUE:   CT of the abdomen and pelvis with IV contrast. Delayed images were obtained. Coronal and sagittal reconstructions were obtained.  Radiation dose reduction techniques included automated exposure control or exposure modulation based on body size. Count of  known CT and cardiac nuc med studies performed in previous 12 months: 5.     COMPARISON:   5/29/2019    FINDINGS:  Lung bases are clear except for a tiny left lower lobe calcified granuloma. Gallbladder is within normal limits. No biliary obstruction is seen. There are small left renal cortical cyst. There is mild-to-moderate bilateral hydronephrosis and hydroureter.  There is massive distention of the urinary bladder extending into the abdomen. Findings are all likely secondary to a bladder outlet obstruction. No stones are seen within the ureters or bladder. There is no urinary bladder wall thickening. There is  stable nodular enlargement of both adrenal glands compatible with adenomas. Solid abdominal organs are otherwise normal. There is a moderate stool burden in the colon compatible with constipation. There are scattered colonic diverticula. There is no  evidence of acute diverticulitis. No small bowel obstruction is seen. The appendix is normal. Solid pelvic organs are within normal limits.      Impression:         1. Massive distention of the urinary bladder with bilateral hydronephrosis and hydroureter. Findings are compatible with bladder outlet obstruction.  2. Moderate colonic stool burden with colonic diverticulosis. No diverticulitis is seen.  3. No bowel obstruction. Normal appendix.          Signer Name: Samm Sparks MD   Signed: 4/15/2020 12:40 AM   Workstation Name: [facility]    [facility]        Results for orders placed during the hospital encounter of 11/19/19   Adult Transesophageal Echo (LIZANDRO) W/ Cont if Necessary  Per Protocol    Narrative · Left ventricular systolic function is normal. Estimated EF = 65%.  · Left ventricular wall thickness is consistent with hypertrophy.  · Small patent foramen ovale present. Saline test results are positive   with valsalva manuever.  · There is mild mitral valve prolapse of the anterior mitral leaflet.  · Mild tricuspid valve regurgitation is present.  · Estimated right ventricular systolic pressure from tricuspid   regurgitation is mildly elevated (35-45 mmHg).  · There is mild plaque in the descending aorta present.          Assessment/Plan   Assessment & Plan     Active Hospital Problems    Diagnosis POA   • **Acute UTI (urinary tract infection) [N39.0] Yes   • RAFAT (acute kidney injury) (CMS/HCC) [N17.9] Yes   • Acute urinary retention [R33.8] Yes   • Bilateral hydronephrosis [N13.30] Yes   • Type 2 diabetes mellitus with hyperglycemia (CMS/HCC) [E11.65] Yes   • Constipation [K59.00] Yes   • Tobacco use [Z72.0] Yes   • Hypertension [I10] Yes   • Hyperlipidemia [E78.5] Yes     Ms. Micahel is a 59 y.o. female with a PMHx of uncontrolled DM, HTN, HLD, and tobacco use who presented with lower abdominal pain x 1 week.    UTI   Acute urinary retention   Bilateral hydronephrosis ?if this is related to stool burden ??  Urology notified of findings by ER.  tx UTI as outlined.  - CT abdomen pelvis shows massive distention of the urinary bladder with bilateral hydronephrosis and hydroureter compatible with bladder outlet obstruction and moderate colonic stool burden with colonic diverticulosis  - culture urine   - blood cultures x2   - consult urology  (notified by ED)  - continue Rocephin   - strict I&Os, bladder scan prn      Constipation with last BM 3 days ago (findings seem out of proportion if last BM only 3 days ago)  - CT abdomen pelvis shows moderate colonic stool burden with colonic diverticulosis  - bowel regimen     Hyperglycemia with DKA, mild; elev beta hydroxybutyrate, anion gap,  glc  - received 4 units of regular insulin in the ED   - check  VBG and serum osmo   - low-sode SSI and finger sticks q3h rather than insulin drip as pt already dropping w/ only 4 units regular insulin and no ivf's  - continue fluids at 100 mL/hr  - A1c in the AM  - consult diabetes educator / CM    RAFAT   - creatinine bumped from baseline   - IVF   - hold Lisinopril and HCTZ tonight     HTN / HLD   - continue Amlodipine and ASA  - holding Lisinopril and HCTZ due to RAFAT  - hydralazine prn for SBP > 180    Tobacco use  - smoking cessation education   - nicotine patch     DVT prophylaxis:  [facility]    CODE STATUS:    Code Status and Medical Interventions:   Ordered at: 04/15/20 0154     Level Of Support Discussed With:    Patient     Code Status:    CPR     Medical Interventions (Level of Support Prior to Arrest):    Full     Admission Status:  I believe this patient meets INPATIENT status due to uti, urinary retention, DKA.  I feel patient’s risk for adverse outcomes and need for care warrant INPATIENT evaluation and I predict the patient’s care encounter to likely last beyond 2 midnights.    Electronically signed by Nia Hyatt PA-C, 04/15/20, 1:57 AM.  I, ARLIN DRAKE MD, personally performed the services described in this documentation as scribed by the above named individual in my presence, and it is both accurate and complete.  4/15/2020  05:17      Electronically signed by Arlin Drake MD at 04/15/20 0518          Emergency Department Notes      Emmy Farrell PA-C at 04/14/20 7785     Attestation signed by Jai Monge MD at 04/15/20 0535          For this patient encounter, I reviewed the NP or PA documentation, treatment plan, and medical decision making. Jai Monge MD 4/15/2020 05:34                  Subjective   Patient presents complaining of abdominal pain.  She reports that she has not had a bowel movement in nearly 3 days.  She reports some nausea without vomiting.  She does note  some abdominal distention.  She does report some dysuria without frequency.      Abdominal Pain   Pain location:  Generalized  Pain quality: sharp    Pain radiates to:  Does not radiate  Pain severity:  Moderate  Onset quality:  Gradual  Duration:  3 days  Timing:  Constant  Progression:  Worsening  Chronicity:  New  Context: laxative use    Context comment:  Constipation  Relieved by:  Nothing  Worsened by:  Nothing  Ineffective treatments: laxitive.  Associated symptoms: dysuria and nausea    Associated symptoms: no chills, no fever and no vomiting        Review of Systems   Constitutional: Negative for chills and fever.   Gastrointestinal: Positive for abdominal pain and nausea. Negative for vomiting.   Genitourinary: Positive for dysuria. Negative for frequency.   All other systems reviewed and are negative.      Past Medical History:   Diagnosis Date   • Acid reflux    • Acute bronchitis    • Cardiac murmur    • Diabetes mellitus (CMS/HCC)    • H/O echocardiogram 08/07/2012    i. LVEF 65%.ii. Mild LVH.iii. Borderline evidence of atrial septal aneurysm.  No PFO.    • History of nuclear stress test 08/22/2014    Negative for ischemia and scars; LVEF 77%.     • Hyperlipidemia    • Hypertension    • Impacted cerumen of both ears    • Migraine    • Sinusitis    • Tobacco abuse     quit 4 days ago.     • Urticaria        No Known Allergies    Past Surgical History:   Procedure Laterality Date   • DENTAL PROCEDURE     • NO PAST SURGERIES         Family History   Problem Relation Age of Onset   • Anxiety disorder Other    • Arthritis Other    • ADD / ADHD Other    • Heart disease Other         cardiac disorder   • Depression Other    • Diabetes Other    • Hyperlipidemia Other    • Hypertension Other    • Lung cancer Other    • Osteoporosis Other        Social History     Socioeconomic History   • Marital status:      Spouse name: Not on file   • Number of children: Not on file   • Years of education: Not on  file   • Highest education level: Not on file   Tobacco Use   • Smoking status: Former Smoker     Last attempt to quit: 2019     Years since quittin.8   • Smokeless tobacco: Never Used   • Tobacco comment: BC PL never smoker    Substance and Sexual Activity   • Alcohol use: Yes     Alcohol/week: 1.0 standard drinks     Types: 1 Glasses of wine per week     Comment: ocassional   • Drug use: No   • Sexual activity: Defer     Comment: Single            Objective   Physical Exam   Constitutional: She is oriented to person, place, and time. She appears well-developed and well-nourished.   HENT:   Head: Normocephalic and atraumatic.   Eyes: EOM are normal. No scleral icterus.   Neck: Normal range of motion.   Cardiovascular: Normal rate, regular rhythm and normal heart sounds. Exam reveals no gallop and no friction rub.   No murmur heard.  Pulmonary/Chest: Effort normal and breath sounds normal. No stridor. No respiratory distress. She has no wheezes.   Abdominal: Soft. Bowel sounds are normal. She exhibits distension. There is tenderness (generalized).   Musculoskeletal: Normal range of motion.   Neurological: She is alert and oriented to person, place, and time.   Skin: Skin is warm and dry.   Psychiatric: She has a normal mood and affect. Her behavior is normal.       Procedures          ED Course  ED Course as of Apr 15 0132   Tue  Glucose(!): >=1000 mg/dL (3+) [WT]   210 Protein, UA(!): Trace [WT]   2220 Glucose(!!): 458 [WT]   2220 Ketones, UA: Negative [WT]   2220 Anion Gap(!): 16.0 [WT]   2220 Hyperglycemia with elevated anion gap  however no ketonuria or acidosis per CMP   CO2: 28.0 [WT]   2220 Bacteria, UA(!): 2+ [WT]   2220 Squamous Epithelial Cells, UA(!): 3-6 [WT]   Wed Apr 15, 2020   0050 Ctap     IMPRESSION:     1. Massive distention of the urinary bladder with bilateral hydronephrosis and hydroureter. Findings are compatible with bladder outlet obstruction.  2. Moderate colonic  stool burden with colonic diverticulosis. No diverticulitis is seen.  3. No bowel obstruction. Normal appendix.       [WT]   0105 Patient has significant urinary retention evident on CT scan.  She will have a Ansari catheter placed.  She does have some bacteria, hyperglycemia and given her significant urinary retention causing hydronephrosis feel she would benefit from admission.    [WT]   0132 Hospitalist advised urology consult. They were consulted.     [WT]      ED Course User Index  [WT] Emmy Farrell PA-C                                           MDM  Number of Diagnoses or Management Options  Acute UTI:   Constipation, unspecified constipation type:   Hyperglycemia:   Urinary retention:   Diagnosis management comments: Patient presents complaining of abdominal pain.  She has some dysuria as well as constipation.  Plan to get CBC, CMP, CT abdomen pelvis, urinalysis, lipase.  Will administer IV fluid bolus and reassess.  If CT is significant for constipation will try enema.       Amount and/or Complexity of Data Reviewed  Decide to obtain previous medical records or to obtain history from someone other than the patient: yes        Final diagnoses:   Urinary retention   Constipation, unspecified constipation type   Hyperglycemia   Acute UTI            Emmy Farrell PA-C  04/15/20 0106       Emmy Farrell PA-C  04/15/20 0132      Electronically signed by Jai Monge MD at 04/15/20 0535       Vital Signs (last day)     Date/Time   Temp   Temp src   Pulse   Resp   BP   Patient Position   SpO2    04/15/20 0834   --   --   98   --   150/66   --   --    04/15/20 0723   98.7 (37.1)   Oral   106   16   140/61   Lying   98    04/15/20 0600   --   --   100   --   --   --   100    04/15/20 0500   --   --   99   --   --   --   100    04/15/20 0418   --   --   91   16   152/68   Lying   100    04/15/20 0400   --   --   92   --   --   --   100    04/15/20 0317   --   --   98   --   --   --   --     04/15/20 0300   --   --   91   --   --   --   100    04/15/20 0247   97.9 (36.6)   Oral   100   18   171/82   Lying   100    04/15/20 0124   --   --   --   --   (!) 191/81   --   --    04/15/20 00:42:13   --   --   88   16   178/90   Lying   96    04/14/20 2342   --   --   82   --   (!) 186/92   --   95    04/14/20 2155   --   --   82   --   --   --   95    04/14/20 2041   --   --   --   20   177/86   Sitting   --    04/14/20 2026   99.3 (37.4)   --   --   --   --   --   --                Current Facility-Administered Medications   Medication Dose Route Frequency Provider Last Rate Last Dose   • amLODIPine (NORVASC) tablet 5 mg  5 mg Oral Q24H Nia Hyatt PA-C   5 mg at 04/15/20 0834   • aspirin chewable tablet 81 mg  81 mg Oral Daily Nia Hyatt PA-C   81 mg at 04/15/20 0834   • atorvastatin (LIPITOR) tablet 80 mg  80 mg Oral Nightly Nia Hyatt PA-C   80 mg at 04/15/20 0317   • bisacodyl (DULCOLAX) suppository 10 mg  10 mg Rectal Daily PRN Nia Hyatt PA-C       • [START ON 4/16/2020] cefTRIAXone (ROCEPHIN) 1 g/100 mL 0.9% NS (MBP)  1 g Intravenous Q24H Nia Hyatt PA-C       • dextrose (D50W) 25 g/ 50mL Intravenous Solution 25 g  25 g Intravenous Q15 Min PRN Nia Hyatt PA-C       • dextrose (GLUTOSE) oral gel 15 g  15 g Oral Q15 Min PRN Nia Hyatt PA-C       • glucagon (human recombinant) (GLUCAGEN DIAGNOSTIC) injection 1 mg  1 mg Subcutaneous Q15 Min PRN Nia Hyatt PA-C       • heparin (porcine) 5000 UNIT/ML injection 5,000 Units  5,000 Units Subcutaneous Q12H Nia Hyatt PA-C   5,000 Units at 04/15/20 0531   • hydrALAZINE (APRESOLINE) injection 10 mg  10 mg Intravenous Once PRN Nia Hyatt PA-C       • insulin lispro (humaLOG) injection 0-7 Units  0-7 Units Subcutaneous Q3H Nia Hyatt PA-C   3 Units at 04/15/20 0834   • magnesium hydroxide (MILK OF MAGNESIA) suspension 2400 mg/10mL 10 mL  10 mL Oral Daily PRN Nia Hyatt PA-C       • nicotine  (NICODERM CQ) 7 MG/24HR patch 1 patch  1 patch Transdermal Q24H Nia Hyatt PA-C   1 patch at 04/15/20 0317   • ondansetron (ZOFRAN) tablet 4 mg  4 mg Oral Q6H PRN Nia Hyatt PA-C        Or   • ondansetron (ZOFRAN) injection 4 mg  4 mg Intravenous Q6H PRN Nia Hyatt PA-C       • pantoprazole (PROTONIX) EC tablet 40 mg  40 mg Oral Daily Nia Hyatt PA-C   40 mg at 04/15/20 0531   • sennosides-docusate (PERICOLACE) 8.6-50 MG per tablet 2 tablet  2 tablet Oral BID PRN Nia Hyatt PA-C       • sodium chloride 0.9 % flush 10 mL  10 mL Intravenous PRN Nia Hyatt PA-C       • sodium chloride 0.9 % flush 10 mL  10 mL Intravenous Q12H Nia Hyatt PA-C   10 mL at 04/15/20 0835   • sodium chloride 0.9 % flush 10 mL  10 mL Intravenous PRN Nia Hyatt PA-C       • sodium chloride 0.9 % infusion  100 mL/hr Intravenous Continuous Nia Hyatt PA-C 100 mL/hr at 04/15/20 0420 100 mL/hr at 04/15/20 0420   • terazosin (HYTRIN) capsule 1 mg  1 mg Oral Nightly Nia Hyatt PA-C   1 mg at 04/15/20 0317     Orders (active)      Start     Ordered    04/16/20 0600  cefTRIAXone (ROCEPHIN) 1 g/100 mL 0.9% NS (MBP)  Every 24 Hours      04/15/20 0242    04/15/20 1100  POC Glucose Q3H  Every 3 Hours      04/15/20 0849    04/15/20 1012  Discontinue Indwelling Urinary Catheter  Once      04/15/20 1012    04/15/20 1012  Notify Provider if Bladder Distention Continues  Until Discontinued      04/15/20 1012    04/15/20 1012  Consult Pharmacist For Review of Medications That May Cause Urinary Retention - RN To Place Order for Consult it Needed  Continuous      04/15/20 1012    04/15/20 1011  Diet Regular; Cardiac, Consistent Carbohydrate  Diet Effective Now      04/15/20 1012    04/15/20 0900  amLODIPine (NORVASC) tablet 5 mg  Every 24 Hours Scheduled      04/15/20 0242    04/15/20 0900  aspirin chewable tablet 81 mg  Daily      04/15/20 0242    04/15/20 0600  pantoprazole (PROTONIX) EC tablet 40 mg   Daily      04/15/20 0242    04/15/20 0600  heparin (porcine) 5000 UNIT/ML injection 5,000 Units  Every 12 Hours Scheduled      04/15/20 0242    04/15/20 0500  insulin lispro (humaLOG) injection 0-7 Units  Every 3 Hours      04/15/20 0359    04/15/20 0300  nicotine (NICODERM CQ) 7 MG/24HR patch 1 patch  Every 24 Hours Scheduled      04/15/20 0253    04/15/20 0253  Diabetes Educator Consult  Once     Provider:  (Not yet assigned)    04/15/20 0252    04/15/20 0251  hydrALAZINE (APRESOLINE) injection 10 mg  Once As Needed      04/15/20 0251    04/15/20 0251  Blood Culture - Blood,  Once      04/15/20 0250    04/15/20 0251  Blood Culture - Blood,  Once     Comments:  From a different site than #1.      04/15/20 0250    04/15/20 0244  atorvastatin (LIPITOR) tablet 80 mg  Nightly      04/15/20 0242    04/15/20 0244  terazosin (HYTRIN) capsule 1 mg  Nightly      04/15/20 0242    04/15/20 0244  sodium chloride 0.9 % flush 10 mL  Every 12 Hours Scheduled      04/15/20 0242    04/15/20 0244  sodium chloride 0.9 % infusion  Continuous      04/15/20 0242    04/15/20 0243  Oxygen Therapy- Nasal Cannula; Titrate for SPO2: 90% - 95%  Continuous      04/15/20 0242    04/15/20 0243  Insert Peripheral IV  Once      04/15/20 0242    04/15/20 0243  Fall Precautions  Continuous      04/15/20 0242    04/15/20 0242  ondansetron (ZOFRAN) tablet 4 mg  Every 6 Hours PRN      04/15/20 0242    04/15/20 0242  ondansetron (ZOFRAN) injection 4 mg  Every 6 Hours PRN      04/15/20 0242    04/15/20 0242  dextrose (GLUTOSE) oral gel 15 g  Every 15 Minutes PRN      04/15/20 0242    04/15/20 0242  dextrose (D50W) 25 g/ 50mL Intravenous Solution 25 g  Every 15 Minutes PRN      04/15/20 0242    04/15/20 0242  glucagon (human recombinant) (GLUCAGEN DIAGNOSTIC) injection 1 mg  Every 15 Minutes PRN      04/15/20 0242    04/15/20 0242  sodium chloride 0.9 % flush 10 mL  As Needed      04/15/20 0242    04/15/20 0242  bisacodyl (DULCOLAX) suppository 10 mg   Daily PRN      04/15/20 0242    04/15/20 0242  magnesium hydroxide (MILK OF MAGNESIA) suspension 2400 mg/10mL 10 mL  Daily PRN      04/15/20 0242    04/15/20 0242  sennosides-docusate (PERICOLACE) 8.6-50 MG per tablet 2 tablet  2 Times Daily PRN      04/15/20 0242    04/15/20 0145  Code Status and Medical Interventions:  Continuous      04/15/20 0154    04/14/20 2037  Insert peripheral IV  Once      04/14/20 2037 04/14/20 2036  sodium chloride 0.9 % flush 10 mL  As Needed      04/14/20 2037    Unscheduled  Up With Assistance  As Needed      04/15/20 0242    Unscheduled  Bladder Scan  As Needed      04/15/20 0250    Unscheduled  Bladder Scan if Patient Unable to Void 4-6 Hours After Catheter Removal  As Needed      04/15/20 1012    Unscheduled  If Bladder Scan Volume is Less Than 500mL & Patient is Without Symptoms of Bladder Discomfort / Distention Monitor Every 1-2 Hours for Spontaneous Void  As Needed      04/15/20 1012    Unscheduled  Straight Cath Every 4-6 Hours As Needed If Patient is Unable to Void After 4-6 Hours, Bladder Scan Volume is Greater Than 500mL & Patient Has Symptoms of Bladder Discomfort / Distention  As Needed      04/15/20 1012    Unscheduled  Schedule / Prompt Voiding For Patients With Urinary Incontinence  As Needed      04/15/20 1012    Pending  Inpatient Diabetes Educator Consult  Once     Provider:  (Not yet assigned)    Pending                Operative/Procedure Notes (all)    No notes of this type exist for this encounter.         Physician Progress Notes (all)    No notes of this type exist for this encounter.            Consult Notes (all)      Edgar Eden MD at 04/15/20 0818      Consult Orders    1. Inpatient Urology Consult [[id number]] ordered by Nia Hyatt PA-C at 04/15/20 0154                Urology Consult    Date of Admission: 4/14/2020  Date of Consult: 04/15/20    Primary Care Physician: Monet Howe APRN  Referring Physician: Trace  Riccardo    Chief complaint/Reason for consultation urinary retention    Subjective     History of Present Illness Thelma Michael is a 59-year-old black female G4, P3 PRISCILLA 1 who presented to the emergency room yesterday with 3-day history of progressive midline lower abdomen and pelvic pain.  She had not had a bowel movement in several days and had been voiding very erratically over the last 48 to 72 hours.  ER work-up showed her to be in retention of stool and urine.  She had a Ansari catheter inserted and has had a bowel movement this morning and is feeling better.    She reports the last several months conserving her insulin and using only half of her recommended dose.  That is partially financial considerations.        Review of Systems she denies dysuria or hematuria.  She has had no previous histories of urinary retention.  She has no chest pain or shortness of breath.  She has no diarrhea or vomiting.  Otherwise complete ROS is negative except as mentioned above.    Past Medical History:  has a past medical history of Acid reflux, Acute bronchitis, Cardiac murmur, Diabetes mellitus (CMS/[facility]), H/O echocardiogram (08/07/2012), History of nuclear stress test (08/22/2014), Hyperlipidemia, Hypertension, Impacted cerumen of both ears, Migraine, Sinusitis, Tobacco abuse, and Urticaria.    Past Surgical History:  has a past surgical history that includes No past surgeries and Dental surgery.    Family History: family history includes ADD / ADHD in an other family member; Anxiety disorder in an other family member; Arthritis in an other family member; Depression in an other family member; Diabetes in an other family member; Heart disease in an other family member; Hyperlipidemia in an other family member; Hypertension in an other family member; Lung cancer in an other family member; Osteoporosis in an other family member.    Social History:  reports that she quit smoking about 10 months ago. She has never used smokeless  tobacco. She reports that she drinks about 1.0 standard drinks of alcohol per week. She reports that she does not use drugs.    Medications: Scheduled Meds:  amLODIPine 5 mg Oral Q24H   aspirin 81 mg Oral Daily   atorvastatin 80 mg Oral Nightly   [START ON 4/16/2020] cefTRIAXone 1 g Intravenous Q24H   heparin (porcine) 5,000 Units Subcutaneous Q12H   insulin lispro 0-7 Units Subcutaneous Q3H   nicotine 1 patch Transdermal Q24H   pantoprazole 40 mg Oral Daily   sodium chloride 10 mL Intravenous Q12H   terazosin 1 mg Oral Nightly     Continuous Infusions:  sodium chloride 100 mL/hr Last Rate: 100 mL/hr (04/15/20 0420)     PRN Meds:.bisacodyl  •  dextrose  •  dextrose  •  glucagon (human recombinant)  •  hydrALAZINE  •  magnesium hydroxide  •  ondansetron **OR** ondansetron  •  sennosides-docusate  •  sodium chloride  •  sodium chloride  No Known Allergies    Objective    Physical Exam:   Vital Signs have been reviewed  Physical Exam  Well-developed well-nourished thin black female who is normal appearance and for her stated age.  HEENT: NCAT PERRLA EOMI  Heart regular rate and rhythm  Lungs clear to auscultation  Abdomen is soft and nontender  Extremities Free of cyanosis clubbing or edema  Pelvic is deferred but has an indwelling Ansari catheter is draining clear urine  Neurologically is grossly intact  Psych: Normal    Results Reviewed:  I have personally reviewed current lab, radiology, and data and agree with results.  CT abdomen pelvis shows very distended bladder and a colon full of stool.  Her kidneys have some very mild bilateral hydronephrosis that was not present on a CT scan in 2019 but I think is totally due to the large distended bladder.    Also of significance is her hemoglobin A1c is 17.1.  Results from last 7 days   Lab Units 04/15/20  0334   WBC 10*3/mm3 9.88   HEMOGLOBIN g/dL 13.2   PLATELETS 10*3/mm3 293     Results from last 7 days   Lab Units 04/15/20  0334   SODIUM mmol/L 144   POTASSIUM mmol/L  3.4*   CO2 mmol/L 29.0   BUN mg/dL 19   CREATININE mg/dL 0.81   GLUCOSE mg/dL 370*   CALCIUM mg/dL 9.6             Acute UTI (urinary tract infection)    Hyperlipidemia    Hypertension    Tobacco use    RAFAT (acute kidney injury) (CMS/[facility])    Acute urinary retention    Bilateral hydronephrosis    Type 2 diabetes mellitus with hyperglycemia (CMS/[facility])    Constipation    Urinary retention        Assessment/Plan   Assessment & Plan acute urinary retention with fecal retention also in the setting of extreme poorly controlled diabetes.  Her creatinine is preserved and I think the hydronephrosis is purely due to backflow from a distended bladder.  That has now resolved with Ansari catheter insertion and she has had a good bowel movement this morning already.  I am concerned that she has a motor neurogenic bladder from my poorly controlled diabetes.  I discussed with her the possibility of needing to learn self-catheterization.  Initially she demonstrates a great reluctance to this concept.  We will give her a voiding trial in the next day or 2 and if she retains large volumes then will teach her in self-catheterization.    I discussed the patients findings and my recommendations with: Patient    Edgar Eden MD  04/15/20  08:18    Electronically signed by Edgar Eden MD at 04/15/20 [id number]

## 2020-04-15 NOTE — PROGRESS NOTES
[facility] Medicine Services  ADMISSION FOLLOW-UP NOTE          Patient admitted after midnight, H&P by my partner performed earlier on today's date reviewed.  Interim findings, labs, and charting also reviewed.        The [facility] Hospital Problem List has been managed and updated to include any new diagnoses:  Active Hospital Problems    Diagnosis  POA   • **Acute UTI (urinary tract infection) [N39.0]  Yes   • RAFAT (acute kidney injury) (CMS/[facility]) [N17.9]  Yes   • Acute urinary retention [R33.8]  Yes   • Bilateral hydronephrosis [N13.30]  Yes   • Type 2 diabetes mellitus with hyperglycemia (CMS/[facility]) [E11.65]  Yes   • Constipation [K59.00]  Yes   • Urinary retention [R33.9]  Yes   • Tobacco use [Z72.0]  Yes   • Hypertension [I10]  Yes   • Hyperlipidemia [E78.5]  Yes      Resolved Hospital Problems   No resolved problems to display.         ADDITIONAL PLAN:  - detailed assessment and plan form admission reviewed  - Urology has seen. Feels this is either related to constipation vs uncontrolled diabetes. Either way no intervention is recommended and furthermore has recommend I and O cath at home if this persists. D/C jo and perform PVR.  - Blood glucose better. Stop q 3 checks.      Electronically signed by Anay Gu II, DO, 04/15/20, 11:32 AM.

## 2020-04-15 NOTE — H&P
[facility] Medicine Services  HISTORY AND PHYSICAL    Patient Name: Thelma Michael  : 1960  MRN: [id number]  Primary Care Physician: Monet Howe APRN  Date of admission: 2020      Subjective   Subjective     Chief Complaint:  Abdominal pain     HPI:  Thelma Michael is a 59 y.o. female with a PMHx of uncontrolled DM, HTN, HLD, and tobacco use presented to [facility] ED c/o lower abdominal pain x 1 week. The patient reports the pain was initially intermittent but became constant over the past 2 days. Today the patient developed nausea but no episodes of vomiting. She admits to associated abdominal distention and constipation. Her last BM was 3 days ago and she reports was normal. She took Doculax at home. She also c/o dysuria for several days. The patient reports she last urinated this morning. She denies hx of kidney/bladder problems or urinary retention. The patient states she lost her insurance and was cutting her insulin in half (70/30 6 units BID) for the last few weeks. She states she now has insurance and has been taking her insulin as prescribed for 2 days. Today her glucose meter was unable to read her sugar for the first time. The patient denies fever, chills, cough, SOB or chest pain. She denies polydipsia or polyuria. She has no other complaints. The patient smokes 5-6 cig/day and smokes marijuana nightly. She drinks alcohol 1x/month. The patient lives with her son.     While in the ED, patient with a temperature of 99.3 and hypertensive.  Labs revealed glucose 458, creatinine 1.02, alk phos 127, anion gap 16, beta hydroxy 1.233.  Urinalysis positive for 2+ bacteria and 3+ glucose.  CT abdomen pelvis shows massive distention of the urinary bladder with bilateral hydronephrosis and hydroureter compatible with bladder outlet obstruction and moderate colonic stool burden with colonic diverticulosis.  The patient received 4 units of regular insulin  and was started on Rocephin in the ED.  She will be admitted to the hospital service for further medical management.    Attending HPI;  60 y/o female w/ complaints of abd pain and constipation and difficulty voiding.  Pt states last BM was 3 days ago and was normal.  Has had abd distention, dysuria worsened today.  Dec uop over the course of hte day.  Has not been taking insulin as prescribed related to losing insurance.  No f/c.  No shortness of breath or cough.    Review of Systems   Constitutional: Negative for chills, diaphoresis, fatigue and fever.   HENT: Negative for congestion, sore throat and trouble swallowing.    Eyes: Negative for pain and visual disturbance.   Respiratory: Negative for cough, shortness of breath and wheezing.    Cardiovascular: Negative for chest pain, palpitations and leg swelling.   Gastrointestinal: Positive for abdominal distention, abdominal pain, constipation and nausea. Negative for blood in stool, diarrhea and vomiting.   Genitourinary: Positive for difficulty urinating and dysuria.   Musculoskeletal: Negative for back pain and gait problem.   Skin: Negative for rash and wound.   Neurological: Negative for dizziness, syncope, speech difficulty, weakness and headaches.   Hematological: Negative for adenopathy. Does not bruise/bleed easily.   Psychiatric/Behavioral: Negative for agitation and confusion.      All other systems reviewed and are negative.     Personal History     Past Medical History:   Diagnosis Date   • Acid reflux    • Acute bronchitis    • Cardiac murmur    • Diabetes mellitus (CMS/[facility])    • H/O echocardiogram 08/07/2012    i. LVEF 65%.ii. Mild LVH.iii. Borderline evidence of atrial septal aneurysm.  No PFO.    • History of nuclear stress test 08/22/2014    Negative for ischemia and scars; LVEF 77%.     • Hyperlipidemia    • Hypertension    • Impacted cerumen of both ears    • Migraine    • Sinusitis    • Tobacco abuse     quit 4 days ago.     • Urticaria               Past Surgical History:   Procedure Laterality Date   • DENTAL PROCEDURE     • NO PAST SURGERIES         Family History: family history includes ADD / ADHD in an other family member; Anxiety disorder in an other family member; Arthritis in an other family member; Depression in an other family member; Diabetes in an other family member; Heart disease in an other family member; Hyperlipidemia in an other family member; Hypertension in an other family member; Lung cancer in an other family member; Osteoporosis in an other family member. Otherwise pertinent FHx was reviewed and unremarkable.     Social History:  reports that she quit smoking about 10 months ago. She has never used smokeless tobacco. She reports that she drinks about 1.0 standard drinks of alcohol per week. She reports that she does not use drugs.  Social History     Social History Narrative   • Not on file       Medications:  Available home medication information reviewed.  Medications Prior to Admission   Medication Sig Dispense Refill Last Dose   • albuterol sulfate  (90 Base) MCG/ACT inhaler Inhale 2 puffs Every 4 (Four) Hours As Needed for Wheezing. 18 g 5 Taking   • amLODIPine (NORVASC) 5 MG tablet Take 1 tablet by mouth Daily. 30 tablet 0 Taking   • Ascorbic Acid (VITAMIN C PO) Vitamin C TABS   Taking   • aspirin 81 MG tablet Take 1 tablet by mouth Daily. 30 tablet 0 Taking   • atorvastatin (LIPITOR) 80 MG tablet Take 1 tablet by mouth Every Night. 30 tablet 5    • b complex vitamins capsule Take 1 capsule by mouth Daily. 30 capsule 11    • Blood Glucose Monitoring Suppl (FREESTYLE FREEDOM LITE) w/Device kit 1 kit 3 (Three) Times a Day. TEST; For: Diabetic hypoglycemia, Diabetic peripheral neuropathy 1 each 0 Taking   • calcium carbonate (Calcium 600) 600 MG tablet Take 1 tablet by mouth Daily. 30 tablet 11    • carbamide peroxide (DEBROX) 6.5 % otic solution Administer 5 drops into both ears 2 (Two) Times a Day. Use as directed; For:  Impacted cerumen of both ears 14.8 mL 5 Taking   • Cyanocobalamin (VITAMIN B12 PO) Vitamin B12 TABS   Taking   • dicyclomine (BENTYL) 20 MG tablet Take 1 tablet by mouth Every 6 (Six) Hours As Needed (diarrhea). 60 tablet 1 Taking   • doxazosin (CARDURA) 1 MG tablet Take 1 tablet by mouth Every Night. 30 tablet 2    • fluconazole (DIFLUCAN) 100 MG tablet Take 1 tablet by mouth Daily for 7 days. 7 tablet 0    • gabapentin (NEURONTIN) 400 MG capsule Take 1 capsule by mouth 3 (Three) Times a Day. 90 capsule 0 Taking   • glucagon (GLUCAGON EMERGENCY) 1 MG injection Use as directed; For: Diabetics mellitus   Taking   • glucose blood (Glucose Meter Test) test strip Use as instructed to check blood glucose levels 3 times daily 100 each 5 Taking   • glucose monitor monitoring kit 1 each 3 (Three) Times a Day. One touch glucometer 1 each 0 Taking   • glucose monitor monitoring kit 1 each As Needed (Check blood sugars 3 times daily.). 1 each 0 Taking   • hydrochlorothiazide (MICROZIDE) 12.5 MG capsule Take 1 capsule by mouth Daily. 30 capsule 6 Taking   • Insulin Lispro (ADMELOG SOLOSTAR) 100 UNIT/ML injection pen Inject 0-14 units under skin into appropriate area based upon sliding scale 3 times daily with meals as needed. Do not exceed 40 units per day. 5 pen 5    • Insulin NPH Isophane & Regular (70-30) 100 UNIT/ML suspension pen-injector Inject 12 Units under the skin into the appropriate area as directed 2 (Two) Times a Day With Meals. 3 pen 5    • Insulin Pen Needle (BD Pen Needle Cydney U/F) 32G X 4 MM misc Take 1 each by mouth 4 (Four) Times a Day. 100 each 5    • Lancets misc Use as instructed to test blood glucose levels 3 times daily. 100 each 5 Taking   • lisinopril (PRINIVIL,ZESTRIL) 40 MG tablet Take 1 tablet by mouth Daily. 30 tablet 0 Taking   • nitrofurantoin, macrocrystal-monohydrate, (MACROBID) 100 MG capsule Take 1 capsule by mouth Every 12 (Twelve) Hours for 7 days. 14 capsule 0    • pantoprazole (PROTONIX)  40 MG EC tablet Take 1 tablet by mouth Daily. 30 tablet 11    • traMADol (ULTRAM) 50 MG tablet Take 1 tablet by mouth 2 (Two) Times a Day As Needed for Moderate Pain . 28 tablet 0        No Known Allergies    Objective   Objective     Vital Signs:   Temp:  [97.9 °F (36.6 °C)-99.3 °F (37.4 °C)] 97.9 °F (36.6 °C)  Heart Rate:  [] 100  Resp:  [16-20] 18  BP: (171-191)/(81-92) 171/82        Physical Exam   Constitutional: Awake, drowsy, lying in bed   Eyes: PERRLA, sclerae icteric bilaterally, no conjunctival injection  HENT: NCAT, mucous membranes severely dry   Neck: Supple, no thyromegaly, no lymphadenopathy, trachea midline  Respiratory: Clear to auscultation bilaterally, nonlabored respirations   Cardiovascular: RRR, + systolic murmur appreciated, palpable pedal pulses bilaterally  Gastrointestinal: Positive bowel sounds, soft, nontender, no distention or guarding   Musculoskeletal: No bilateral ankle edema, no clubbing or cyanosis to extremities  Psychiatric: Appropriate affect, cooperative  Neurologic: Oriented x 3, strength symmetric in all extremities, Cranial Nerves grossly intact to confrontation, speech clear  Skin: No rashes or wounds    Separate exam performed by me w/ no changes to the above.    Results Reviewed:  I have personally reviewed current lab and radiology data.    Results from last 7 days   Lab Units 04/14/20  2051   WBC 10*3/mm3 10.58   HEMOGLOBIN g/dL 14.7   HEMATOCRIT % 43.5   PLATELETS 10*3/mm3 272     Results from last 7 days   Lab Units 04/14/20  2147   SODIUM mmol/L 138   POTASSIUM mmol/L 3.7   CHLORIDE mmol/L 94*   CO2 mmol/L 28.0   BUN mg/dL 22   CREATININE mg/dL 1.02*   GLUCOSE mg/dL 458*   CALCIUM mg/dL 9.9   ALT (SGPT) U/L 23   AST (SGOT) U/L 24     Estimated Creatinine Clearance: 52.1 mL/min (A) (by C-G formula based on SCr of 1.02 mg/dL (H)).  Brief Urine Lab Results  (Last result in the past 365 days)      Color   Clarity   Blood   Leuk Est   Nitrite   Protein   CREAT    Urine HCG        04/14/20 2052 Yellow Turbid Negative Negative Negative Trace             Imaging Results (Last 24 Hours)     Procedure Component Value Units Date/Time    CT Abdomen Pelvis With Contrast [[id number]] Collected:  04/15/20 0040     Updated:  04/15/20 0042    Narrative:       CT Abdomen Pelvis W    INDICATION:   Abdominal distention with generalized abdominal pain for 2 to 3 days. Constipation.    TECHNIQUE:   CT of the abdomen and pelvis with IV contrast. Delayed images were obtained. Coronal and sagittal reconstructions were obtained.  Radiation dose reduction techniques included automated exposure control or exposure modulation based on body size. Count of  known CT and cardiac nuc med studies performed in previous 12 months: 5.     COMPARISON:   5/29/2019    FINDINGS:  Lung bases are clear except for a tiny left lower lobe calcified granuloma. Gallbladder is within normal limits. No biliary obstruction is seen. There are small left renal cortical cyst. There is mild-to-moderate bilateral hydronephrosis and hydroureter.  There is massive distention of the urinary bladder extending into the abdomen. Findings are all likely secondary to a bladder outlet obstruction. No stones are seen within the ureters or bladder. There is no urinary bladder wall thickening. There is  stable nodular enlargement of both adrenal glands compatible with adenomas. Solid abdominal organs are otherwise normal. There is a moderate stool burden in the colon compatible with constipation. There are scattered colonic diverticula. There is no  evidence of acute diverticulitis. No small bowel obstruction is seen. The appendix is normal. Solid pelvic organs are within normal limits.      Impression:         1. Massive distention of the urinary bladder with bilateral hydronephrosis and hydroureter. Findings are compatible with bladder outlet obstruction.  2. Moderate colonic stool burden with colonic diverticulosis. No diverticulitis  is seen.  3. No bowel obstruction. Normal appendix.          Signer Name: Samm Sparks MD   Signed: 4/15/2020 12:40 AM   Workstation Name: [facility]    [facility]        Results for orders placed during the hospital encounter of 11/19/19   Adult Transesophageal Echo (LIZANDRO) W/ Cont if Necessary Per Protocol    Narrative · Left ventricular systolic function is normal. Estimated EF = 65%.  · Left ventricular wall thickness is consistent with hypertrophy.  · Small patent foramen ovale present. Saline test results are positive   with valsalva manuever.  · There is mild mitral valve prolapse of the anterior mitral leaflet.  · Mild tricuspid valve regurgitation is present.  · Estimated right ventricular systolic pressure from tricuspid   regurgitation is mildly elevated (35-45 mmHg).  · There is mild plaque in the descending aorta present.          Assessment/Plan   Assessment & Plan     Active Hospital Problems    Diagnosis POA   • **Acute UTI (urinary tract infection) [N39.0] Yes   • RAFAT (acute kidney injury) (CMS/HCC) [N17.9] Yes   • Acute urinary retention [R33.8] Yes   • Bilateral hydronephrosis [N13.30] Yes   • Type 2 diabetes mellitus with hyperglycemia (CMS/HCC) [E11.65] Yes   • Constipation [K59.00] Yes   • Tobacco use [Z72.0] Yes   • Hypertension [I10] Yes   • Hyperlipidemia [E78.5] Yes     Ms. Michael is a 59 y.o. female with a PMHx of uncontrolled DM, HTN, HLD, and tobacco use who presented with lower abdominal pain x 1 week.    UTI   Acute urinary retention   Bilateral hydronephrosis ?if this is related to stool burden ??  Urology notified of findings by ER.  tx UTI as outlined.  - CT abdomen pelvis shows massive distention of the urinary bladder with bilateral hydronephrosis and hydroureter compatible with bladder outlet obstruction and moderate colonic stool burden with colonic diverticulosis  - culture urine   - blood cultures x2   - consult urology  (notified by ED)  - continue  Rocephin   - strict I&Os, bladder scan prn      Constipation with last BM 3 days ago (findings seem out of proportion if last BM only 3 days ago)  - CT abdomen pelvis shows moderate colonic stool burden with colonic diverticulosis  - bowel regimen     Hyperglycemia with DKA, mild; elev beta hydroxybutyrate, anion gap, glc  - received 4 units of regular insulin in the ED   - check  VBG and serum osmo   - low-sode SSI and finger sticks q3h rather than insulin drip as pt already dropping w/ only 4 units regular insulin and no ivf's  - continue fluids at 100 mL/hr  - A1c in the AM  - consult diabetes educator / CM    RAFAT   - creatinine bumped from baseline   - IVF   - hold Lisinopril and HCTZ tonight     HTN / HLD   - continue Amlodipine and ASA  - holding Lisinopril and HCTZ due to RAFAT  - hydralazine prn for SBP > 180    Tobacco use  - smoking cessation education   - nicotine patch     DVT prophylaxis:  [facility]    CODE STATUS:    Code Status and Medical Interventions:   Ordered at: 04/15/20 0154     Level Of Support Discussed With:    Patient     Code Status:    CPR     Medical Interventions (Level of Support Prior to Arrest):    Full     Admission Status:  I believe this patient meets INPATIENT status due to uti, urinary retention, DKA.  I feel patient’s risk for adverse outcomes and need for care warrant INPATIENT evaluation and I predict the patient’s care encounter to likely last beyond 2 midnights.    Electronically signed by Nia Hyatt PA-C, 04/15/20, 1:57 AM.  I, JOHNY DRAKE MD, personally performed the services described in this documentation as scribed by the above named individual in my presence, and it is both accurate and complete.  4/15/2020  05:17

## 2020-04-15 NOTE — PROGRESS NOTES
Discharge Planning Assessment  [facility]     Patient Name: Thelma Michael  MRN: [id number]  Today's Date: 4/15/2020    Admit Date: 4/14/2020    Discharge Needs Assessment     Row Name 04/15/20 1035       Living Environment    Lives With  child(bronson), adult pt resides in [facility]    Name(s) of Who Lives With Patient  juni Parr    Current Living Arrangements  home/apartment/condo    Primary Care Provided by  self    Provides Primary Care For  no one    Family Caregiver if Needed  child(bronson), adult    Family Caregiver Names  juni Parr    Quality of Family Relationships  helpful;involved;supportive    Able to Return to Prior Arrangements  yes       Resource/Environmental Concerns    Resource/Environmental Concerns  none       Transition Planning    Patient/Family Anticipates Transition to  home with family    Patient/Family Anticipated Services at Transition  none    Transportation Anticipated  family or friend will provide       Discharge Needs Assessment    Readmission Within the Last 30 Days  no previous admission in last 30 days    Concerns to be Addressed  denies needs/concerns at this time    Equipment Currently Used at Home  glucometer    Anticipated Changes Related to Illness  none    Equipment Needed After Discharge  none    Provided Post Acute Provider List?  N/A        Discharge Plan     Row Name 04/15/20 1036       Plan    Plan  home    Provided Post Acute Provider Quality & Resource List?  N/A    Patient/Family in Agreement with Plan  yes    Plan Comments  CM spoke with pt at bedside. Pt reports she lives with her son Keshav and is independent of adls. Pt reports she has a glucometer and denies home health or outpatient services. Pt has reported she was having trouble with her insurance and insulin however she reports to CM she has Medicaid coverage and now has her new insulin. Per Epic notes on 04/13 HOWIE Higginbotham called in new RX for Amelog as pt's Humalog was not covered by  "insurance. Pt reports she picked up her new prescription. Pt denies needs and plans to return home, CM will continue to follow.     Final Discharge Disposition Code  01 - home or self-care        Destination      Coordination has not been started for this encounter.      Durable Medical Equipment      Coordination has not been started for this encounter.      Dialysis/Infusion      Coordination has not been started for this encounter.      Home Medical Care      Coordination has not been started for this encounter.      Therapy      Coordination has not been started for this encounter.      Community Resources      Coordination has not been started for this encounter.          Demographic Summary     Row Name 04/15/20 1033       General Information    Referral Source  admission list    Reason for Consult  discharge planning    Preferred Language  English    General Information Comments  PCP- Monet Clark,sedrick       Contact Information    Permission Granted to Share Info With          Functional Status     Row Name 04/15/20 1033       Functional Status    Usual Activity Tolerance  good    Current Activity Tolerance  moderate       Functional Status, IADL    Medications  independent    Meal Preparation  independent    Housekeeping  independent    Laundry  independent    Shopping  independent       Employment/    Employment/ Comments  Pt confirms she has [employer] Medicaid, denies concerns at this time. Pt reports she was having trouble with her insulin however she has a new prescription and is \"okay now\" Per EPIC noted Humalog was not covered and Monet Clark called in New rx for Amelog on 4/13.         Psychosocial    No documentation.       Abuse/Neglect    No documentation.       Legal    No documentation.       Substance Abuse    No documentation.       Patient Forms    No documentation.           Cassandra Magdaleno RN    "

## 2020-04-15 NOTE — PLAN OF CARE
Problem: Patient Care Overview  Goal: Plan of Care Review  Outcome: Ongoing (interventions implemented as appropriate)  Flowsheets (Taken 4/15/2020 4655)  Outcome Summary: patient been resting, jo d/nadia, no n/o at this time, will cont to monitor      hard copy

## 2020-04-15 NOTE — ED PROVIDER NOTES
Subjective   Patient presents complaining of abdominal pain.  She reports that she has not had a bowel movement in nearly 3 days.  She reports some nausea without vomiting.  She does note some abdominal distention.  She does report some dysuria without frequency.      Abdominal Pain   Pain location:  Generalized  Pain quality: sharp    Pain radiates to:  Does not radiate  Pain severity:  Moderate  Onset quality:  Gradual  Duration:  3 days  Timing:  Constant  Progression:  Worsening  Chronicity:  New  Context: laxative use    Context comment:  Constipation  Relieved by:  Nothing  Worsened by:  Nothing  Ineffective treatments: laxitive.  Associated symptoms: dysuria and nausea    Associated symptoms: no chills, no fever and no vomiting        Review of Systems   Constitutional: Negative for chills and fever.   Gastrointestinal: Positive for abdominal pain and nausea. Negative for vomiting.   Genitourinary: Positive for dysuria. Negative for frequency.   All other systems reviewed and are negative.      Past Medical History:   Diagnosis Date   • Acid reflux    • Acute bronchitis    • Cardiac murmur    • Diabetes mellitus (CMS/HCC)    • H/O echocardiogram 08/07/2012    i. LVEF 65%.ii. Mild LVH.iii. Borderline evidence of atrial septal aneurysm.  No PFO.    • History of nuclear stress test 08/22/2014    Negative for ischemia and scars; LVEF 77%.     • Hyperlipidemia    • Hypertension    • Impacted cerumen of both ears    • Migraine    • Sinusitis    • Tobacco abuse     quit 4 days ago.     • Urticaria        No Known Allergies    Past Surgical History:   Procedure Laterality Date   • DENTAL PROCEDURE     • NO PAST SURGERIES         Family History   Problem Relation Age of Onset   • Anxiety disorder Other    • Arthritis Other    • ADD / ADHD Other    • Heart disease Other         cardiac disorder   • Depression Other    • Diabetes Other    • Hyperlipidemia Other    • Hypertension Other    • Lung cancer Other    •  Osteoporosis Other        Social History     Socioeconomic History   • Marital status:      Spouse name: Not on file   • Number of children: Not on file   • Years of education: Not on file   • Highest education level: Not on file   Tobacco Use   • Smoking status: Former Smoker     Last attempt to quit: 2019     Years since quittin.8   • Smokeless tobacco: Never Used   • Tobacco comment: BC PL never smoker    Substance and Sexual Activity   • Alcohol use: Yes     Alcohol/week: 1.0 standard drinks     Types: 1 Glasses of wine per week     Comment: ocassional   • Drug use: No   • Sexual activity: Defer     Comment: Single            Objective   Physical Exam   Constitutional: She is oriented to person, place, and time. She appears well-developed and well-nourished.   HENT:   Head: Normocephalic and atraumatic.   Eyes: EOM are normal. No scleral icterus.   Neck: Normal range of motion.   Cardiovascular: Normal rate, regular rhythm and normal heart sounds. Exam reveals no gallop and no friction rub.   No murmur heard.  Pulmonary/Chest: Effort normal and breath sounds normal. No stridor. No respiratory distress. She has no wheezes.   Abdominal: Soft. Bowel sounds are normal. She exhibits distension. There is tenderness (generalized).   Musculoskeletal: Normal range of motion.   Neurological: She is alert and oriented to person, place, and time.   Skin: Skin is warm and dry.   Psychiatric: She has a normal mood and affect. Her behavior is normal.       Procedures           ED Course  ED Course as of Apr 15 0132   Tue 8 Glucose(!): >=1000 mg/dL (3+) [WT]   2108 Protein, UA(!): Trace [WT]   2220 Glucose(!!): 458 [WT]   2220 Ketones, UA: Negative [WT]   2220 Anion Gap(!): 16.0 [WT]   2220 Hyperglycemia with elevated anion gap  however no ketonuria or acidosis per CMP   CO2: 28.0 [WT]   2220 Bacteria, UA(!): 2+ [WT]   2220 Squamous Epithelial Cells, UA(!): 3-6 [WT]   Wed Apr 15, 2020   0050  Ctap     IMPRESSION:     1. Massive distention of the urinary bladder with bilateral hydronephrosis and hydroureter. Findings are compatible with bladder outlet obstruction.  2. Moderate colonic stool burden with colonic diverticulosis. No diverticulitis is seen.  3. No bowel obstruction. Normal appendix.       [WT]   0105 Patient has significant urinary retention evident on CT scan.  She will have a Ansari catheter placed.  She does have some bacteria, hyperglycemia and given her significant urinary retention causing hydronephrosis feel she would benefit from admission.    [WT]   0132 Hospitalist advised urology consult. They were consulted.     [WT]      ED Course User Index  [WT] Emmy Farrell PA-C                                           MDM  Number of Diagnoses or Management Options  Acute UTI:   Constipation, unspecified constipation type:   Hyperglycemia:   Urinary retention:   Diagnosis management comments: Patient presents complaining of abdominal pain.  She has some dysuria as well as constipation.  Plan to get CBC, CMP, CT abdomen pelvis, urinalysis, lipase.  Will administer IV fluid bolus and reassess.  If CT is significant for constipation will try enema.       Amount and/or Complexity of Data Reviewed  Decide to obtain previous medical records or to obtain history from someone other than the patient: yes        Final diagnoses:   Urinary retention   Constipation, unspecified constipation type   Hyperglycemia   Acute UTI            Emmy Farrell PA-C  04/15/20 0106       Emmy Farrell PA-C  04/15/20 0132

## 2020-04-15 NOTE — CONSULTS
Urology Consult    Date of Admission: 4/14/2020  Date of Consult: 04/15/20    Primary Care Physician: Monet Howe APRN  Referring Physician: Carlos Gu    Chief complaint/Reason for consultation urinary retention    Subjective     History of Present Illness Thelma Michael is a 59-year-old black female G4, P3 PRISCILLA 1 who presented to the emergency room yesterday with 3-day history of progressive midline lower abdomen and pelvic pain.  She had not had a bowel movement in several days and had been voiding very erratically over the last 48 to 72 hours.  ER work-up showed her to be in retention of stool and urine.  She had a Ansari catheter inserted and has had a bowel movement this morning and is feeling better.    She reports the last several months conserving her insulin and using only half of her recommended dose.  That is partially financial considerations.        Review of Systems she denies dysuria or hematuria.  She has had no previous histories of urinary retention.  She has no chest pain or shortness of breath.  She has no diarrhea or vomiting.  Otherwise complete ROS is negative except as mentioned above.    Past Medical History:  has a past medical history of Acid reflux, Acute bronchitis, Cardiac murmur, Diabetes mellitus (CMS/[facility]), H/O echocardiogram (08/07/2012), History of nuclear stress test (08/22/2014), Hyperlipidemia, Hypertension, Impacted cerumen of both ears, Migraine, Sinusitis, Tobacco abuse, and Urticaria.    Past Surgical History:  has a past surgical history that includes No past surgeries and Dental surgery.    Family History: family history includes ADD / ADHD in an other family member; Anxiety disorder in an other family member; Arthritis in an other family member; Depression in an other family member; Diabetes in an other family member; Heart disease in an other family member; Hyperlipidemia in an other family member; Hypertension in an other family member; Lung cancer in an  other family member; Osteoporosis in an other family member.    Social History:  reports that she quit smoking about 10 months ago. She has never used smokeless tobacco. She reports that she drinks about 1.0 standard drinks of alcohol per week. She reports that she does not use drugs.    Medications: Scheduled Meds:  amLODIPine 5 mg Oral Q24H   aspirin 81 mg Oral Daily   atorvastatin 80 mg Oral Nightly   [START ON 4/16/2020] cefTRIAXone 1 g Intravenous Q24H   heparin (porcine) 5,000 Units Subcutaneous Q12H   insulin lispro 0-7 Units Subcutaneous Q3H   nicotine 1 patch Transdermal Q24H   pantoprazole 40 mg Oral Daily   sodium chloride 10 mL Intravenous Q12H   terazosin 1 mg Oral Nightly     Continuous Infusions:  sodium chloride 100 mL/hr Last Rate: 100 mL/hr (04/15/20 0420)     PRN Meds:.bisacodyl  •  dextrose  •  dextrose  •  glucagon (human recombinant)  •  hydrALAZINE  •  magnesium hydroxide  •  ondansetron **OR** ondansetron  •  sennosides-docusate  •  sodium chloride  •  sodium chloride  No Known Allergies    Objective    Physical Exam:   Vital Signs have been reviewed  Physical Exam  Well-developed well-nourished thin black female who is normal appearance and for her stated age.  HEENT: NCAT PERRLA EOMI  Heart regular rate and rhythm  Lungs clear to auscultation  Abdomen is soft and nontender  Extremities Free of cyanosis clubbing or edema  Pelvic is deferred but has an indwelling Ansari catheter is draining clear urine  Neurologically is grossly intact  Psych: Normal    Results Reviewed:  I have personally reviewed current lab, radiology, and data and agree with results.  CT abdomen pelvis shows very distended bladder and a colon full of stool.  Her kidneys have some very mild bilateral hydronephrosis that was not present on a CT scan in 2019 but I think is totally due to the large distended bladder.    Also of significance is her hemoglobin A1c is 17.1.  Results from last 7 days   Lab Units 04/15/20  5936    WBC 10*3/mm3 9.88   HEMOGLOBIN g/dL 13.2   PLATELETS 10*3/mm3 293     Results from last 7 days   Lab Units 04/15/20  0334   SODIUM mmol/L 144   POTASSIUM mmol/L 3.4*   CO2 mmol/L 29.0   BUN mg/dL 19   CREATININE mg/dL 0.81   GLUCOSE mg/dL 370*   CALCIUM mg/dL 9.6             Acute UTI (urinary tract infection)    Hyperlipidemia    Hypertension    Tobacco use    RAFAT (acute kidney injury) (CMS/[facility])    Acute urinary retention    Bilateral hydronephrosis    Type 2 diabetes mellitus with hyperglycemia (CMS/[facility])    Constipation    Urinary retention        Assessment/Plan   Assessment & Plan acute urinary retention with fecal retention also in the setting of extreme poorly controlled diabetes.  Her creatinine is preserved and I think the hydronephrosis is purely due to backflow from a distended bladder.  That has now resolved with Ansari catheter insertion and she has had a good bowel movement this morning already.  I am concerned that she has a motor neurogenic bladder from my poorly controlled diabetes.  I discussed with her the possibility of needing to learn self-catheterization.  Initially she demonstrates a great reluctance to this concept.  We will give her a voiding trial in the next day or 2 and if she retains large volumes then will teach her in self-catheterization.    I discussed the patients findings and my recommendations with: Patient    Edgar Eden MD  04/15/20  08:18

## 2020-04-15 NOTE — PLAN OF CARE
Problem: Patient Care Overview  Goal: Plan of Care Review  Outcome: Ongoing (interventions implemented as appropriate)  Flowsheets (Taken 4/15/2020 0348)  Progress: no change  Plan of Care Reviewed With: patient  Outcome Summary: Pt admitted from ED. Elevated BP, MD aware. RA.  Ansari cath in place, skin is clean, dry and intact. No complaints of pain. Pt resting well. Will continue to monitor.     Problem: Urinary Tract Infection (Adult)  Goal: Signs and Symptoms of Listed Potential Problems Will be Absent, Minimized or Managed (Urinary Tract Infection)  Outcome: Ongoing (interventions implemented as appropriate)  Flowsheets (Taken 4/15/2020 0348)  Problems Assessed (Urinary Tract Infection): all  Problems Present (UTI): infection progression     Problem: Fall Risk (Adult)  Goal: Identify Related Risk Factors and Signs and Symptoms  Outcome: Ongoing (interventions implemented as appropriate)  Flowsheets (Taken 4/15/2020 0348)  Related Risk Factors (Fall Risk): bladder function altered; environment unfamiliar  Signs and Symptoms (Fall Risk): presence of risk factors

## 2020-04-16 ENCOUNTER — READMISSION MANAGEMENT (OUTPATIENT)
Dept: CALL CENTER | Facility: HOSPITAL | Age: 60
End: 2020-04-16

## 2020-04-16 VITALS
WEIGHT: 125.7 LBS | HEART RATE: 100 BPM | SYSTOLIC BLOOD PRESSURE: 150 MMHG | TEMPERATURE: 98.5 F | DIASTOLIC BLOOD PRESSURE: 76 MMHG | OXYGEN SATURATION: 100 % | RESPIRATION RATE: 18 BRPM | BODY MASS INDEX: 19.05 KG/M2 | HEIGHT: 68 IN

## 2020-04-16 PROBLEM — N39.0 ACUTE UTI (URINARY TRACT INFECTION): Status: RESOLVED | Noted: 2019-05-29 | Resolved: 2020-04-16

## 2020-04-16 PROBLEM — N17.9 AKI (ACUTE KIDNEY INJURY): Status: RESOLVED | Noted: 2020-04-15 | Resolved: 2020-04-16

## 2020-04-16 PROBLEM — K59.00 CONSTIPATION: Status: RESOLVED | Noted: 2020-04-15 | Resolved: 2020-04-16

## 2020-04-16 LAB
BACTERIA SPEC AEROBE CULT: NORMAL
GLUCOSE BLDC GLUCOMTR-MCNC: 111 MG/DL (ref 70–130)
GLUCOSE BLDC GLUCOMTR-MCNC: 134 MG/DL (ref 70–130)
GLUCOSE BLDC GLUCOMTR-MCNC: 161 MG/DL (ref 70–130)
GLUCOSE BLDC GLUCOMTR-MCNC: 165 MG/DL (ref 70–130)
GLUCOSE BLDC GLUCOMTR-MCNC: 259 MG/DL (ref 70–130)

## 2020-04-16 PROCEDURE — 25010000002 CEFTRIAXONE PER 250 MG: Performed by: PHYSICIAN ASSISTANT

## 2020-04-16 PROCEDURE — 25010000002 HEPARIN (PORCINE) PER 1000 UNITS: Performed by: PHYSICIAN ASSISTANT

## 2020-04-16 PROCEDURE — 82962 GLUCOSE BLOOD TEST: CPT

## 2020-04-16 PROCEDURE — 99239 HOSP IP/OBS DSCHRG MGMT >30: CPT | Performed by: INTERNAL MEDICINE

## 2020-04-16 RX ORDER — CEFUROXIME AXETIL 250 MG/1
250 TABLET ORAL 2 TIMES DAILY
Qty: 6 TABLET | Refills: 0 | Status: SHIPPED | OUTPATIENT
Start: 2020-04-16 | End: 2020-04-16 | Stop reason: SDUPTHER

## 2020-04-16 RX ORDER — AMOXICILLIN 250 MG
2 CAPSULE ORAL 2 TIMES DAILY PRN
Qty: 60 TABLET | Refills: 0 | Status: SHIPPED | OUTPATIENT
Start: 2020-04-16 | End: 2020-05-29 | Stop reason: SDUPTHER

## 2020-04-16 RX ORDER — AMOXICILLIN 250 MG
2 CAPSULE ORAL 2 TIMES DAILY PRN
Qty: 60 TABLET | Refills: 0 | Status: SHIPPED | OUTPATIENT
Start: 2020-04-16 | End: 2020-04-16

## 2020-04-16 RX ORDER — CEFUROXIME AXETIL 250 MG/1
250 TABLET ORAL 2 TIMES DAILY
Qty: 6 TABLET | Refills: 0 | Status: SHIPPED | OUTPATIENT
Start: 2020-04-16 | End: 2020-04-19

## 2020-04-16 RX ORDER — ONDANSETRON 4 MG/1
4 TABLET, FILM COATED ORAL EVERY 6 HOURS PRN
Qty: 20 TABLET | Refills: 0 | Status: SHIPPED | OUTPATIENT
Start: 2020-04-16 | End: 2021-03-31 | Stop reason: HOSPADM

## 2020-04-16 RX ORDER — ONDANSETRON 4 MG/1
4 TABLET, FILM COATED ORAL EVERY 6 HOURS PRN
Qty: 20 TABLET | Refills: 0 | Status: SHIPPED | OUTPATIENT
Start: 2020-04-16 | End: 2020-04-16

## 2020-04-16 RX ADMIN — AMLODIPINE BESYLATE 5 MG: 5 TABLET ORAL at 08:15

## 2020-04-16 RX ADMIN — SODIUM CHLORIDE 1 G: 900 INJECTION INTRAVENOUS at 06:26

## 2020-04-16 RX ADMIN — NICOTINE 1 PATCH: 7 PATCH, EXTENDED RELEASE TRANSDERMAL at 06:26

## 2020-04-16 RX ADMIN — PANTOPRAZOLE SODIUM 40 MG: 40 TABLET, DELAYED RELEASE ORAL at 06:25

## 2020-04-16 RX ADMIN — ASPIRIN 81 MG CHEWABLE TABLET 81 MG: 81 TABLET CHEWABLE at 08:15

## 2020-04-16 RX ADMIN — HEPARIN SODIUM 5000 UNITS: 5000 INJECTION INTRAVENOUS; SUBCUTANEOUS at 08:15

## 2020-04-16 RX ADMIN — SODIUM CHLORIDE, PRESERVATIVE FREE 10 ML: 5 INJECTION INTRAVENOUS at 08:16

## 2020-04-16 RX ADMIN — INSULIN LISPRO 2 UNITS: 100 INJECTION, SOLUTION INTRAVENOUS; SUBCUTANEOUS at 00:34

## 2020-04-16 NOTE — OUTREACH NOTE
Prep Survey      Responses   [facility] facility patient discharged from?  [facility]   Is LACE score < 7 ?  No   Eligibility  [facility]   Date of Admission  04/14/20   Date of Discharge  04/16/20   Discharge Disposition  Home or Self Care   Discharge diagnosis  Acute urinary retention Bilateral hydronephrosis   RAFAT   COVID-19 Test Status  Not tested   Does the patient have one of the following disease processes/diagnoses(primary or secondary)?  Other   Does the patient have Home health ordered?  Yes   What is the Home health agency?   [facility]    Is there a DME ordered?  Yes   What DME was ordered?  [facility]   SELF CATH ARTICLES   Prep survey completed?  Yes          Lisy Rowley RN

## 2020-04-16 NOTE — PLAN OF CARE
Problem: Patient Care Overview  Goal: Plan of Care Review  Outcome: Ongoing (interventions implemented as appropriate)  Flowsheets  Taken 4/16/2020 0451  Progress: no change  Outcome Summary: Patient has had a decent shift, sleeping for most of the evening. VSS, and patient has been alert and oriented times four. No complaints of pain overnight. Patient has been unable to void, has been in and out catheterized once tonight and will be again if she doesn't urinate on her own. Nursing staff will continue to monitor and assess the patient.  Taken 4/15/2020 2000  Plan of Care Reviewed With: patient  Goal: Individualization and Mutuality  Outcome: Ongoing (interventions implemented as appropriate)  Flowsheets (Taken 4/16/2020 0451)  Patient Specific Goals (Include Timeframe): Monitor and assess for pain q2hrs and prn     Problem: Urinary Tract Infection (Adult)  Goal: Signs and Symptoms of Listed Potential Problems Will be Absent, Minimized or Managed (Urinary Tract Infection)  Outcome: Ongoing (interventions implemented as appropriate)  Flowsheets  Taken 4/15/2020 0348 by Clara Waite RN  Problems Assessed (Urinary Tract Infection): all  Taken 4/16/2020 0451 by Stephan Gan RN  Problems Present (UTI): fluid and electrolyte imbalance;infection progression     Problem: Fall Risk (Adult)  Goal: Identify Related Risk Factors and Signs and Symptoms  Outcome: Ongoing (interventions implemented as appropriate)  Flowsheets (Taken 4/15/2020 0348 by Clara Waite RN)  Related Risk Factors (Fall Risk): bladder function altered;environment unfamiliar  Signs and Symptoms (Fall Risk): presence of risk factors  Goal: Absence of Fall  Outcome: Ongoing (interventions implemented as appropriate)  Flowsheets (Taken 4/16/2020 0451)  Absence of Fall: making progress toward outcome

## 2020-04-16 NOTE — PROGRESS NOTES
Awake and alert.  No complaints  Ansari removed yesterday and now being treated with in and out catheterization.  She has not attempted it on her own just yet.  She has not voided spontaneously.    Good bowel function return.    I suspect she will need to learn self-catheterization.

## 2020-04-16 NOTE — PLAN OF CARE
Problem: Patient Care Overview  Goal: Plan of Care Review  Outcome: Ongoing (interventions implemented as appropriate)  Flowsheets (Taken 4/16/2020 0920)  Progress: improving  Plan of Care Reviewed With: patient  Outcome Summary: Ready for Discharge, probably going to have to learn self straigh caths. Will continue to monitor this am     Problem: Patient Care Overview  Goal: Individualization and Mutuality  Outcome: Ongoing (interventions implemented as appropriate)  Flowsheets (Taken 4/16/2020 0451 by Stephan Gan RN)  Patient Specific Goals (Include Timeframe): Monitor and assess for pain q2hrs and prn     Problem: Urinary Tract Infection (Adult)  Goal: Signs and Symptoms of Listed Potential Problems Will be Absent, Minimized or Managed (Urinary Tract Infection)  Outcome: Ongoing (interventions implemented as appropriate)  Flowsheets (Taken 4/16/2020 0920)  Problems Assessed (Urinary Tract Infection): all  Problems Present (UTI): other (see comments) (Urinary retention)     Problem: Fall Risk (Adult)  Goal: Identify Related Risk Factors and Signs and Symptoms  Outcome: Ongoing (interventions implemented as appropriate)  Flowsheets (Taken 4/16/2020 0920)  Related Risk Factors (Fall Risk): gait/mobility problems  Signs and Symptoms (Fall Risk): presence of risk factors     Problem: Fall Risk (Adult)  Goal: Absence of Fall  Outcome: Outcome(s) achieved  Flowsheets (Taken 4/16/2020 0920)  Absence of Fall: achieves outcome

## 2020-04-16 NOTE — DISCHARGE SUMMARY
[facility] Medicine Services  DISCHARGE SUMMARY    Patient Name: Thelma Michael  : 1960  MRN: [id number]    Date of Admission: 2020  8:32 PM  Date of Discharge: 2020  Primary Care Physician: Monet Howe APRN    Consults     Date and Time Order Name Status Description    4/15/2020 0242 Inpatient Urology Consult Completed           Hospital Course     Presenting Problem:   Urinary retention [R33.9]    Active Hospital Problems    Diagnosis  POA   • Acute urinary retention [R33.8]  Yes   • Bilateral hydronephrosis [N13.30]  Yes   • Type 2 diabetes mellitus with hyperglycemia (CMS/[facility]) [E11.65]  Yes   • Urinary retention [R33.9]  Yes   • Tobacco use [Z72.0]  Yes   • Hypertension [I10]  Yes   • Hyperlipidemia [E78.5]  Yes      Resolved Hospital Problems    Diagnosis Date Resolved POA   • **Acute UTI (urinary tract infection) [N39.0] 2020 Yes   • ARFAT (acute kidney injury) (CMS/[facility]) [N17.9] 2020 Yes   • Constipation [K59.00] 2020 Yes          Hospital Course:  Ms. Michael is a 59 y.o. female with a PMHx of uncontrolled DM, HTN, HLD, and tobacco use who presented with lower abdominal pain x 1 week.    A/P:     UTI   Acute urinary retention   Bilateral hydronephrosis, suspect neurogenic bladder.  - CT abdomen pelvis shows massive distention of the urinary bladder with bilateral hydronephrosis and hydroureter compatible with bladder outlet obstruction and moderate colonic stool burden with colonic diverticulosis. She received a bowel regimen with resolution of her constipation but no improvement in retention. Urology evaluated patient and felt this was most likely due to neurogenic bladder related to diabetes and recommended self cath at home.  - She received IV rocephin for 2 days. Will transition to PO ceftin for 3 more days at d/c. Cx pending, will follow.  - She was educated on proper technique of in and out cath prior to d/c. CM arranged  catheter supplies prior to d/c. Home health at d/c as well.  - F/U Urology in 1 month.      Hyperglycemia with DKA, mild; elev beta hydroxybutyrate, anion gap, glc  RAFAT  - Patient treated with IV fluids and SQ insulin with quick resolution of her DKA. Prior to admission patient had some difficulty with insurance which did not allow her to fill her insulin. Those problems were rectified and patient able to receive her home insulin at d/c. Instructed to continue home dose.     Discharge Follow Up Recommendations for outpatient labs/diagnostics:   PCP in 1 week   Urology in 1 month    Day of Discharge     HPI:  Up in bed smiling. Overall feeling much better. Wants to home.    Review of Systems  Gen- No fevers, chills  CV- No chest pain, palpitations  Resp- No cough, dyspnea  GI- No N/V/D, abd pain    Vital Signs:   Temp:  [98.2 °F (36.8 °C)-100 °F (37.8 °C)] 98.5 °F (36.9 °C)  Heart Rate:  [70-96] 88  Resp:  [16-18] 18  BP: (122-160)/(55-77) 150/76     Physical Exam:  Constitutional: No acute distress, awake, alert  HENT: NCAT, mucous membranes moist  Respiratory: Clear to auscultation bilaterally, respiratory effort normal   Cardiovascular: RRR, no murmurs, rubs, or gallops, palpable pedal pulses bilaterally  Gastrointestinal: Positive bowel sounds, soft, nontender, nondistended, thin  Musculoskeletal: No bilateral ankle edema  Psychiatric: Appropriate affect, cooperative  Neurologic: Oriented x 3, strength symmetric in all extremities, Cranial Nerves grossly intact to confrontation, speech clear  Skin: No rashes    Pertinent  and/or Most Recent Results     Results from last 7 days   Lab Units 04/15/20  0334 04/14/20  2147 04/14/20 2051   WBC 10*3/mm3 9.88  --  10.58   HEMOGLOBIN g/dL 13.2  --  14.7   HEMATOCRIT % 40.1  --  43.5   PLATELETS 10*3/mm3 293  --  272   SODIUM mmol/L 144 138  --    POTASSIUM mmol/L 3.4* 3.7  --    CHLORIDE mmol/L 100 94*  --    CO2 mmol/L 29.0 28.0  --    BUN mg/dL 19 22  --    CREATININE  mg/dL 0.81 1.02*  --    GLUCOSE mg/dL 370* 458*  --    CALCIUM mg/dL 9.6 9.9  --      Results from last 7 days   Lab Units 04/14/20  2147   BILIRUBIN mg/dL 0.4   ALK PHOS U/L 127*   ALT (SGPT) U/L 23   AST (SGOT) U/L 24           Invalid input(s): TG, LDLCALC, LDLREALC  Results from last 7 days   Lab Units 04/15/20  0334   HEMOGLOBIN A1C % 17.10*       Brief Urine Lab Results  (Last result in the past 365 days)      Color   Clarity   Blood   Leuk Est   Nitrite   Protein   CREAT   Urine HCG        04/15/20 0627 Yellow Clear Negative Negative Negative Negative               Microbiology Results Abnormal     Procedure Component Value - Date/Time    Blood Culture - Blood, Arm, Left [[id number]] Collected:  04/15/20 0334    Lab Status:  Preliminary result Specimen:  Blood from Arm, Left Updated:  04/16/20 0730     Blood Culture No growth at 24 hours    Blood Culture - Blood, Arm, Left [[id number]] Collected:  04/15/20 0334    Lab Status:  Preliminary result Specimen:  Blood from Arm, Left Updated:  04/16/20 0730     Blood Culture No growth at 24 hours          Imaging Results (All)     Procedure Component Value Units Date/Time    CT Abdomen Pelvis With Contrast [[id number]] Collected:  04/15/20 0040     Updated:  04/15/20 0042    Narrative:       CT Abdomen Pelvis W    INDICATION:   Abdominal distention with generalized abdominal pain for 2 to 3 days. Constipation.    TECHNIQUE:   CT of the abdomen and pelvis with IV contrast. Delayed images were obtained. Coronal and sagittal reconstructions were obtained.  Radiation dose reduction techniques included automated exposure control or exposure modulation based on body size. Count of  known CT and cardiac nuc med studies performed in previous 12 months: 5.     COMPARISON:   5/29/2019    FINDINGS:  Lung bases are clear except for a tiny left lower lobe calcified granuloma. Gallbladder is within normal limits. No biliary obstruction is seen. There are small left renal cortical  cyst. There is mild-to-moderate bilateral hydronephrosis and hydroureter.  There is massive distention of the urinary bladder extending into the abdomen. Findings are all likely secondary to a bladder outlet obstruction. No stones are seen within the ureters or bladder. There is no urinary bladder wall thickening. There is  stable nodular enlargement of both adrenal glands compatible with adenomas. Solid abdominal organs are otherwise normal. There is a moderate stool burden in the colon compatible with constipation. There are scattered colonic diverticula. There is no  evidence of acute diverticulitis. No small bowel obstruction is seen. The appendix is normal. Solid pelvic organs are within normal limits.      Impression:         1. Massive distention of the urinary bladder with bilateral hydronephrosis and hydroureter. Findings are compatible with bladder outlet obstruction.  2. Moderate colonic stool burden with colonic diverticulosis. No diverticulitis is seen.  3. No bowel obstruction. Normal appendix.          Signer Name: Samm Sparks MD   Signed: 4/15/2020 12:40 AM   Workstation Name: [facility]    [facility]                    Results for orders placed during the hospital encounter of 11/19/19   Adult Transesophageal Echo (LIZANDRO) W/ Cont if Necessary Per Protocol    Narrative · Left ventricular systolic function is normal. Estimated EF = 65%.  · Left ventricular wall thickness is consistent with hypertrophy.  · Small patent foramen ovale present. Saline test results are positive   with valsalva manuever.  · There is mild mitral valve prolapse of the anterior mitral leaflet.  · Mild tricuspid valve regurgitation is present.  · Estimated right ventricular systolic pressure from tricuspid   regurgitation is mildly elevated (35-45 mmHg).  · There is mild plaque in the descending aorta present.          Plan for Follow-up of Pending Labs/Results: To be directed to me   Order Current  Status    Urine Culture - Urine, Urine, Clean Catch In process    Blood Culture - Blood, Arm, Left Preliminary result    Blood Culture - Blood, Arm, Left Preliminary result        Discharge Details        Discharge Medications      New Medications      Instructions Start Date   cefuroxime 250 MG tablet  Commonly known as:  Ceftin   250 mg, Oral, 2 Times Daily      ondansetron 4 MG tablet  Commonly known as:  ZOFRAN   4 mg, Oral, Every 6 Hours PRN      sennosides-docusate 8.6-50 MG per tablet  Commonly known as:  PERICOLACE   2 tablets, Oral, 2 Times Daily PRN         Continue These Medications      Instructions Start Date   albuterol sulfate  (90 Base) MCG/ACT inhaler  Commonly known as:  PROVENTIL HFA;VENTOLIN HFA;PROAIR HFA   2 puffs, Inhalation, Every 4 Hours PRN      amLODIPine 5 MG tablet  Commonly known as:  NORVASC   5 mg, Oral, Every 24 Hours Scheduled      aspirin 81 MG tablet   81 mg, Oral, Daily      atorvastatin 80 MG tablet  Commonly known as:  LIPITOR   80 mg, Oral, Nightly      b complex vitamins capsule   1 capsule, Oral, Daily      carbamide peroxide 6.5 % otic solution  Commonly known as:  Debrox   5 drops, Both Ears, 2 Times Daily, Use as directed; For: Impacted cerumen of both ears      dicyclomine 20 MG tablet  Commonly known as:  Bentyl   20 mg, Oral, Every 6 Hours PRN      doxazosin 1 MG tablet  Commonly known as:  CARDURA   1 mg, Oral, Nightly      FreeStyle Freedom Lite w/Device kit   1 kit, Does not apply, 3 Times Daily, TEST; For: Diabetic hypoglycemia, Diabetic peripheral neuropathy      gabapentin 400 MG capsule  Commonly known as:  NEURONTIN   400 mg, Oral, 3 Times Daily      Glucagon Emergency 1 MG injection  Generic drug:  glucagon   Use as directed; For: Diabetics mellitus      glucose blood test strip  Commonly known as:  Glucose Meter Test   Use as instructed to check blood glucose levels 3 times daily      glucose monitor monitoring kit   1 each, Does not apply, 3 Times  Daily, One touch glucometer      glucose monitor monitoring kit   1 each, Does not apply, As Needed      hydroCHLOROthiazide 12.5 MG capsule  Commonly known as:  MICROZIDE   12.5 mg, Oral, Daily      Insulin Lispro 100 UNIT/ML injection pen  Commonly known as:  ADMELOG SOLOSTAR   Inject 0-14 units under skin into appropriate area based upon sliding scale 3 times daily with meals as needed. Do not exceed 40 units per day.      Insulin NPH Isophane & Regular (70-30) 100 UNIT/ML suspension pen-injector   12 Units, Subcutaneous, 2 Times Daily With Meals      Insulin Pen Needle 32G X 4 MM misc  Commonly known as:  BD Pen Needle Cydney U/F   1 each, Oral, 4 Times Daily      Lancets misc   Use as instructed to test blood glucose levels 3 times daily.      lisinopril 40 MG tablet  Commonly known as:  PRINIVIL,ZESTRIL   40 mg, Oral, Daily      pantoprazole 40 MG EC tablet  Commonly known as:  PROTONIX   40 mg, Oral, Daily      traMADol 50 MG tablet  Commonly known as:  ULTRAM   50 mg, Oral, 2 Times Daily PRN      VITAMIN C PO   Vitamin C TABS         Stop These Medications    calcium carbonate 600 MG tablet  Commonly known as:  Calcium 600     fluconazole 100 MG tablet  Commonly known as:  DIFLUCAN     nitrofurantoin (macrocrystal-monohydrate) 100 MG capsule  Commonly known as:  MACROBID     VITAMIN B12 PO            No Known Allergies      Discharge Disposition:  Home or Self Care    Diet:  Hospital:  Diet Order   Procedures   • Diet Regular; Cardiac, Consistent Carbohydrate       Activity:      Restrictions or Other Recommendations: In and out cath every 4 hours.       CODE STATUS:    Code Status and Medical Interventions:   Ordered at: 04/15/20 0154     Level Of Support Discussed With:    Patient     Code Status:    CPR     Medical Interventions (Level of Support Prior to Arrest):    Full       No future appointments.    Additional Instructions for the Follow-ups that You Need to Schedule     Discharge Follow-up with PCP    As directed       Currently Documented PCP:    Monet Howe APRN    PCP Phone Number:    [phone]     Follow Up Details:  1 week hospital follow up               Time Spent on Discharge:  45 minutes    Electronically signed by Anay Gu II, DO, 04/16/20, 12:53 PM.

## 2020-04-16 NOTE — PROGRESS NOTES
Continued Stay Note  [facility]     Patient Name: Thelma Michael  MRN: [id number]  Today's Date: 4/16/2020    Admit Date: 4/14/2020    Discharge Plan     Row Name 04/16/20 1039       Plan    Plan Comments [facility] and [facility] will process orders and recieve insurance approval and contact pt in regards to home cath supplies. Pt is being discharged home today, CM requested pt to be sent home with 3-4  cath kits until Home health is able to see pt and provide additional supplies. MD has ordered for pt to have [facility] [place] [facility] services, CM arranged through [facility] and aware of self cath supplies needed.  Pt has private transportation home.         Discharge Codes    No documentation.             Cassandra Magdaleno RN

## 2020-04-17 ENCOUNTER — NURSE TRIAGE (OUTPATIENT)
Dept: CALL CENTER | Facility: HOSPITAL | Age: 60
End: 2020-04-17

## 2020-04-17 ENCOUNTER — TRANSITIONAL CARE MANAGEMENT TELEPHONE ENCOUNTER (OUTPATIENT)
Dept: CALL CENTER | Facility: HOSPITAL | Age: 60
End: 2020-04-17

## 2020-04-17 NOTE — TELEPHONE ENCOUNTER
"Advised caller there she may have had a defective catheter with possible blocked tubing. Advised caller to try to self cath with a different catheter and see if she gets better results. She is to call back if no better results with a different catheter.     Reason for Disposition  • Urinary catheter care, questions about    Additional Information  • Negative: Shock suspected (e.g., cold/pale/clammy skin, too weak to stand, low BP, rapid pulse)  • Negative: Sounds like a life-threatening emergency to the triager  • Negative: [1] Catheter was accidentally pulled-out AND [2] bright red continuous bleeding  • Negative: SEVERE abdominal pain  • Negative: Fever > 100.5 F (38.1 C)  • Negative: [1] Drinking very little AND [2] dehydration suspected (e.g., no urine > 12 hours, very dry mouth, very lightheaded)  • Negative: Patient sounds very sick or weak to the triager  • Negative: Catheter was accidentally pulled-out  • Negative: [1] Catheter is broken AND [2] is not usable  • Negative: Bleeding around catheter (e.g., from penis or female urethra)  • Negative: Lower abdominal pain or distention  • Negative: [1] No urine in bag > 4 hours AND [2] catheter is not kinked  • Negative: [1] Tea-colored or slightly red urine lasts > 24 hours AND [2] not cleared by increasing fluid intake    AND [3] no recent prostate or bladder surgery  • Negative: [1] Cloudy urine lasts > 24 hours AND [2] not cleared by increasing fluid intake  • Negative: [1] Bloody or red-colored urine  AND [2] no recent prostate or bladder surgery  (Exception: brief episode and urine now clear)  • Negative: [1] Bloody or red-colored urine  AND [2] prostate or bladder surgery > 3 days (72 hours) ago  • Negative: Urine smells bad  • Negative: [1] Catheter is broken or cracked AND [2] is still usable  • Negative: Leakage of urine around catheter    Answer Assessment - Initial Assessment Questions  1. SYMPTOMS: \"What symptoms are you concerned about?\"      Self " "cathed and no urine came out   2. ONSET:  \"When did the symptoms start?\"      Just now  3. FEVER: \"Is there a fever?\" If so, ask: \"What is the temperature, how was it measured, and when did it start?\"      no  4. ABDOMINAL PAIN: \"Is there any abdominal pain?\" (e.g., Scale 1-10; or mild, moderate, severe)      No, bladder feels full  5. URINE COLOR: \"What color is the urine?\"  \"Is there blood present in the urine?\" (e.g., clear, yellow, cloudy, tea-colored, blood streaks, bright red)      na  6. ONSET: \"When was the catheter inserted?\"      Self cathed just a few minutes ago   7. OTHER SYMPTOMS: \"Do you have any other symptoms?\" (e.g., back pain, bad urine odor)       no  8. PREGNANCY: \"Is there any chance you are pregnant?\" \"When was your last menstrual period?\"      na    Protocols used: URINARY CATHETER SYMPTOMS AND QUESTIONS-ADULT-AH      "

## 2020-04-17 NOTE — OUTREACH NOTE
Call Center TCM Note      Responses   [facility] patient discharged from?  [place]   COVID-19 Test Status  Not tested   Does the patient have one of the following disease processes/diagnoses(primary or secondary)?  Other   TCM attempt successful?  Yes   Call start time  1253   Call end time  1303   Discharge diagnosis  Acute urinary retention Bilateral hydronephrosis   RAFAT   Meds reviewed with patient/caregiver?  Yes   Is the patient having any side effects they believe may be caused by any medication additions or changes?  No   Does the patient have all medications ordered at discharge?  No   What is keeping the patient from filling the prescriptions?  Prior authorization Issues [patient states that she has not received her lispro and she wasn't given sliding scale instructions.  Her last fsbs was this morning and it was 246]   Nursing Interventions  -- [I will notify office via epic tcm message]   Is the patient taking all medications as directed (includes completed medication regime)?  Yes   Comments regarding appointments  patient would like a telehealth visit scheduled   Does the patient have an appointment with their PCP within 7 days of discharge?  N/A   Has the patient kept scheduled appointments due by today?  N/A   What is the Home health agency?   [facility]    Has home health visited the patient within 72 hours of discharge?  Yes   Psychosocial issues?  No   Did the patient receive a copy of their discharge instructions?  Yes   Nursing interventions  Reviewed instructions with patient   What is the patient's perception of their health status since discharge?  Improving   Is the patient/caregiver able to teach back signs and symptoms related to disease process for when to call PCP?  Yes   Is the patient/caregiver able to teach back signs and symptoms related to disease process for when to call 911?  Yes   Is the patient/caregiver able to teach back the hierarchy of who to call/visit for  symptoms/problems? PCP, Specialist, Home health nurse, Urgent Care, ED, 911  Yes   Additional teach back comments  Patient self cathed around 350 ml successfully around 1245 today.  She is also urinating small amounts when she feels the urge to go.     TCM call completed?  Yes        Patient has not received lispro insulin due to PA/insurance issues.  She would like a different medication called in to [facility] pharmacy.  She would also like sliding scale instructions with med.  Her fsbs was 246 this morning she has not checked it again.  She would like a telehealth visit scheduled.  Out call disconnected and I tried to call patient back but she did not answer.  Jeevan Rogers LPN    4/17/2020, 13:03

## 2020-04-17 NOTE — OUTREACH NOTE
Call Center TCM Note      Responses   [facility] patient discharged from?  [place]   COVID-19 Test Status  Not tested   Does the patient have one of the following disease processes/diagnoses(primary or secondary)?  Other   TCM attempt successful?  Yes   Call start time  1141   Call Status Comments  patient is going to self cath again b/c she feels like her bladder is full.  She requested that I call back in 45 minutes to get results.   Call end time  1145   Discharge diagnosis  Acute urinary retention Bilateral hydronephrosis   RAFAT          Jeevan Rogers LPN    4/17/2020, 11:46

## 2020-04-19 ENCOUNTER — HOSPITAL ENCOUNTER (EMERGENCY)
Facility: HOSPITAL | Age: 60
Discharge: HOME | End: 2020-04-20
Attending: EMERGENCY MEDICINE | Admitting: EMERGENCY MEDICINE

## 2020-04-19 DIAGNOSIS — R74.8 ELEVATED LIVER ENZYMES: ICD-10-CM

## 2020-04-19 DIAGNOSIS — R33.9 URINARY RETENTION: Primary | ICD-10-CM

## 2020-04-19 DIAGNOSIS — K59.00 CONSTIPATION, UNSPECIFIED CONSTIPATION TYPE: ICD-10-CM

## 2020-04-19 DIAGNOSIS — R73.9 HYPERGLYCEMIA: ICD-10-CM

## 2020-04-19 PROCEDURE — 81001 URINALYSIS AUTO W/SCOPE: CPT | Performed by: EMERGENCY MEDICINE

## 2020-04-19 PROCEDURE — 99284 EMERGENCY DEPT VISIT MOD MDM: CPT

## 2020-04-19 PROCEDURE — 51701 INSERT BLADDER CATHETER: CPT

## 2020-04-20 ENCOUNTER — APPOINTMENT (OUTPATIENT)
Dept: GENERAL RADIOLOGY | Facility: HOSPITAL | Age: 60
End: 2020-04-20

## 2020-04-20 VITALS
HEIGHT: 68 IN | BODY MASS INDEX: 19.7 KG/M2 | DIASTOLIC BLOOD PRESSURE: 65 MMHG | RESPIRATION RATE: 16 BRPM | WEIGHT: 130 LBS | HEART RATE: 81 BPM | SYSTOLIC BLOOD PRESSURE: 154 MMHG | TEMPERATURE: 99.8 F | OXYGEN SATURATION: 100 %

## 2020-04-20 LAB
ALBUMIN SERPL-MCNC: 3.8 G/DL (ref 3.5–5.2)
ALBUMIN/GLOB SERPL: 1.2 G/DL
ALP SERPL-CCNC: 118 U/L (ref 39–117)
ALT SERPL W P-5'-P-CCNC: 48 U/L (ref 1–33)
ANION GAP SERPL CALCULATED.3IONS-SCNC: 13 MMOL/L (ref 5–15)
AST SERPL-CCNC: 42 U/L (ref 1–32)
BACTERIA SPEC AEROBE CULT: NORMAL
BACTERIA SPEC AEROBE CULT: NORMAL
BACTERIA UR QL AUTO: NORMAL /HPF
BASOPHILS # BLD AUTO: 0.04 10*3/MM3 (ref 0–0.2)
BASOPHILS NFR BLD AUTO: 0.5 % (ref 0–1.5)
BILIRUB SERPL-MCNC: <0.2 MG/DL (ref 0.2–1.2)
BILIRUB UR QL STRIP: NEGATIVE
BUN BLD-MCNC: 15 MG/DL (ref 8–23)
BUN/CREAT SERPL: 16.1 (ref 7–25)
CALCIUM SPEC-SCNC: 9.2 MG/DL (ref 8.6–10.5)
CHLORIDE SERPL-SCNC: 101 MMOL/L (ref 98–107)
CLARITY UR: ABNORMAL
CO2 SERPL-SCNC: 29 MMOL/L (ref 22–29)
COLOR UR: YELLOW
CREAT BLD-MCNC: 0.93 MG/DL (ref 0.57–1)
DEPRECATED RDW RBC AUTO: 41.4 FL (ref 37–54)
EOSINOPHIL # BLD AUTO: 0.18 10*3/MM3 (ref 0–0.4)
EOSINOPHIL NFR BLD AUTO: 2 % (ref 0.3–6.2)
ERYTHROCYTE [DISTWIDTH] IN BLOOD BY AUTOMATED COUNT: 12.5 % (ref 12.3–15.4)
GFR SERPL CREATININE-BSD FRML MDRD: 74 ML/MIN/1.73
GLOBULIN UR ELPH-MCNC: 3.1 GM/DL
GLUCOSE BLD-MCNC: 464 MG/DL (ref 65–99)
GLUCOSE BLDC GLUCOMTR-MCNC: 350 MG/DL (ref 70–130)
GLUCOSE UR STRIP-MCNC: ABNORMAL MG/DL
HCT VFR BLD AUTO: 38.3 % (ref 34–46.6)
HGB BLD-MCNC: 12.7 G/DL (ref 12–15.9)
HGB UR QL STRIP.AUTO: NEGATIVE
HYALINE CASTS UR QL AUTO: NORMAL /LPF
IMM GRANULOCYTES # BLD AUTO: 0.02 10*3/MM3 (ref 0–0.05)
IMM GRANULOCYTES NFR BLD AUTO: 0.2 % (ref 0–0.5)
KETONES UR QL STRIP: NEGATIVE
LEUKOCYTE ESTERASE UR QL STRIP.AUTO: NEGATIVE
LYMPHOCYTES # BLD AUTO: 1.51 10*3/MM3 (ref 0.7–3.1)
LYMPHOCYTES NFR BLD AUTO: 17.1 % (ref 19.6–45.3)
MCH RBC QN AUTO: 30.3 PG (ref 26.6–33)
MCHC RBC AUTO-ENTMCNC: 33.2 G/DL (ref 31.5–35.7)
MCV RBC AUTO: 91.4 FL (ref 79–97)
MONOCYTES # BLD AUTO: 0.63 10*3/MM3 (ref 0.1–0.9)
MONOCYTES NFR BLD AUTO: 7.2 % (ref 5–12)
NEUTROPHILS # BLD AUTO: 6.43 10*3/MM3 (ref 1.7–7)
NEUTROPHILS NFR BLD AUTO: 73 % (ref 42.7–76)
NITRITE UR QL STRIP: NEGATIVE
NRBC BLD AUTO-RTO: 0 /100 WBC (ref 0–0.2)
PH UR STRIP.AUTO: 6.5 [PH] (ref 5–8)
PLATELET # BLD AUTO: 320 10*3/MM3 (ref 140–450)
PMV BLD AUTO: 11 FL (ref 6–12)
POTASSIUM BLD-SCNC: 3.8 MMOL/L (ref 3.5–5.2)
PROT SERPL-MCNC: 6.9 G/DL (ref 6–8.5)
PROT UR QL STRIP: NEGATIVE
RBC # BLD AUTO: 4.19 10*6/MM3 (ref 3.77–5.28)
RBC # UR: NORMAL /HPF
REF LAB TEST METHOD: NORMAL
SODIUM BLD-SCNC: 143 MMOL/L (ref 136–145)
SP GR UR STRIP: 1.02 (ref 1–1.03)
SQUAMOUS #/AREA URNS HPF: NORMAL /HPF
UROBILINOGEN UR QL STRIP: ABNORMAL
WBC NRBC COR # BLD: 8.81 10*3/MM3 (ref 3.4–10.8)
WBC UR QL AUTO: NORMAL /HPF
YEAST URNS QL MICRO: NORMAL /HPF

## 2020-04-20 PROCEDURE — 63710000001 INSULIN REGULAR HUMAN PER 5 UNITS: Performed by: EMERGENCY MEDICINE

## 2020-04-20 PROCEDURE — 82962 GLUCOSE BLOOD TEST: CPT

## 2020-04-20 PROCEDURE — 80053 COMPREHEN METABOLIC PANEL: CPT | Performed by: EMERGENCY MEDICINE

## 2020-04-20 PROCEDURE — 74022 RADEX COMPL AQT ABD SERIES: CPT

## 2020-04-20 PROCEDURE — 85025 COMPLETE CBC W/AUTO DIFF WBC: CPT | Performed by: EMERGENCY MEDICINE

## 2020-04-20 RX ADMIN — INSULIN HUMAN 6 UNITS: 100 INJECTION, SOLUTION PARENTERAL at 03:36

## 2020-04-20 NOTE — PAYOR COMM NOTE
"Yara Reyes RN Utilization Review [phone]  Fax # [phone]  Ref # [id number]    Please note discharge date.    Alisa Michael (59 y.o. Female)     Date of Birth Social Security Number Address Home Phone MRN    1960  [address] [place] [address] [phone] [id number]    Anabaptist Marital Status          Cheondoism        Admission Date Admission Type Admitting Provider Attending Provider Department, Room/Bed    20 Emergency Anay Gu II, DO  [facility] 2F, S205/    Discharge Date Discharge Disposition Discharge Destination        2020 Home or Self Care              Attending Provider:  (none)   Allergies:  No Known Allergies    Isolation:  None   Infection:  None   Code Status:  Prior    Ht:  172.7 cm (68\")   Wt:  57 kg (125 lb 11.2 oz)    Admission Cmt:  None   Principal Problem:  Acute UTI (urinary tract infection) [N39.0]                 Active Insurance as of 2020     Primary Coverage     Payor Plan Insurance Group Employer/Plan Group    [employer] [place] [employer] [place]      Payor Plan Address Payor Plan Phone Number Payor Plan Fax Number Effective Dates    [address] [address] [phone]  2020 - None Entered    [facility] [id number]       Subscriber Name Subscriber Birth Date Member ID       ALISA MICHAEL [id number]                 Emergency Contacts      (Rel.) Home Phone Work Phone Mobile Phone    Keshav Soto (Son) [phone] -- --               Discharge Summary      Anay Gu II, DO at 20 0802              [facility]  DISCHARGE SUMMARY    Patient Name: Alisa Michael  : 1960  MRN: [id number]    Date of Admission: 2020  8:32 PM  Date of Discharge: 2020  Primary Care Physician: Monet Howe APRN    Consults     Date and Time Order Name Status Description    4/15/2020 0242 Inpatient Urology " Consult Completed           Hospital Course     Presenting Problem:   Urinary retention [R33.9]    Active Hospital Problems    Diagnosis  POA   • Acute urinary retention [R33.8]  Yes   • Bilateral hydronephrosis [N13.30]  Yes   • Type 2 diabetes mellitus with hyperglycemia (CMS/[facility]) [E11.65]  Yes   • Urinary retention [R33.9]  Yes   • Tobacco use [Z72.0]  Yes   • Hypertension [I10]  Yes   • Hyperlipidemia [E78.5]  Yes      Resolved Hospital Problems    Diagnosis Date Resolved POA   • **Acute UTI (urinary tract infection) [N39.0] 04/16/2020 Yes   • RAFAT (acute kidney injury) (CMS/[facility]) [N17.9] 04/16/2020 Yes   • Constipation [K59.00] 04/16/2020 Yes          Hospital Course:  Ms. Michael is a 59 y.o. female with a PMHx of uncontrolled DM, HTN, HLD, and tobacco use who presented with lower abdominal pain x 1 week.    A/P:     UTI   Acute urinary retention   Bilateral hydronephrosis, suspect neurogenic bladder.  - CT abdomen pelvis shows massive distention of the urinary bladder with bilateral hydronephrosis and hydroureter compatible with bladder outlet obstruction and moderate colonic stool burden with colonic diverticulosis. She received a bowel regimen with resolution of her constipation but no improvement in retention. Urology evaluated patient and felt this was most likely due to neurogenic bladder related to diabetes and recommended self cath at home.  - She received IV rocephin for 2 days. Will transition to PO ceftin for 3 more days at d/c. Cx pending, will follow.  - She was educated on proper technique of in and out cath prior to d/c.  arranged catheter supplies prior to d/c. Home health at d/c as well.  - F/U Urology in 1 month.      Hyperglycemia with DKA, mild; elev beta hydroxybutyrate, anion gap, glc  RAFAT  - Patient treated with IV fluids and SQ insulin with quick resolution of her DKA. Prior to admission patient had some difficulty with insurance which did not allow her to fill her insulin. Those  problems were rectified and patient able to receive her home insulin at d/c. Instructed to continue home dose.     Discharge Follow Up Recommendations for outpatient labs/diagnostics:   PCP in 1 week   Urology in 1 month    Day of Discharge     HPI:  Up in bed smiling. Overall feeling much better. Wants to home.    Review of Systems  Gen- No fevers, chills  CV- No chest pain, palpitations  Resp- No cough, dyspnea  GI- No N/V/D, abd pain    Vital Signs:   Temp:  [98.2 °F (36.8 °C)-100 °F (37.8 °C)] 98.5 °F (36.9 °C)  Heart Rate:  [70-96] 88  Resp:  [16-18] 18  BP: (122-160)/(55-77) 150/76     Physical Exam:  Constitutional: No acute distress, awake, alert  HENT: NCAT, mucous membranes moist  Respiratory: Clear to auscultation bilaterally, respiratory effort normal   Cardiovascular: RRR, no murmurs, rubs, or gallops, palpable pedal pulses bilaterally  Gastrointestinal: Positive bowel sounds, soft, nontender, nondistended, thin  Musculoskeletal: No bilateral ankle edema  Psychiatric: Appropriate affect, cooperative  Neurologic: Oriented x 3, strength symmetric in all extremities, Cranial Nerves grossly intact to confrontation, speech clear  Skin: No rashes    Pertinent  and/or Most Recent Results     Results from last 7 days   Lab Units 04/15/20  0334 04/14/20 2147 04/14/20 2051   WBC 10*3/mm3 9.88  --  10.58   HEMOGLOBIN g/dL 13.2  --  14.7   HEMATOCRIT % 40.1  --  43.5   PLATELETS 10*3/mm3 293  --  272   SODIUM mmol/L 144 138  --    POTASSIUM mmol/L 3.4* 3.7  --    CHLORIDE mmol/L 100 94*  --    CO2 mmol/L 29.0 28.0  --    BUN mg/dL 19 22  --    CREATININE mg/dL 0.81 1.02*  --    GLUCOSE mg/dL 370* 458*  --    CALCIUM mg/dL 9.6 9.9  --      Results from last 7 days   Lab Units 04/14/20 2147   BILIRUBIN mg/dL 0.4   ALK PHOS U/L 127*   ALT (SGPT) U/L 23   AST (SGOT) U/L 24           Invalid input(s): TG, LDLCALC, LDLREALC  Results from last 7 days   Lab Units 04/15/20  0334   HEMOGLOBIN A1C % 17.10*       Brief  Urine Lab Results  (Last result in the past 365 days)      Color   Clarity   Blood   Leuk Est   Nitrite   Protein   CREAT   Urine HCG        04/15/20 0627 Yellow Clear Negative Negative Negative Negative               Microbiology Results Abnormal     Procedure Component Value - Date/Time    Blood Culture - Blood, Arm, Left [[id number]] Collected:  04/15/20 0334    Lab Status:  Preliminary result Specimen:  Blood from Arm, Left Updated:  04/16/20 0730     Blood Culture No growth at 24 hours    Blood Culture - Blood, Arm, Left [[id number]] Collected:  04/15/20 0334    Lab Status:  Preliminary result Specimen:  Blood from Arm, Left Updated:  04/16/20 0730     Blood Culture No growth at 24 hours          Imaging Results (All)     Procedure Component Value Units Date/Time    CT Abdomen Pelvis With Contrast [[id number]] Collected:  04/15/20 0040     Updated:  04/15/20 0042    Narrative:       CT Abdomen Pelvis W    INDICATION:   Abdominal distention with generalized abdominal pain for 2 to 3 days. Constipation.    TECHNIQUE:   CT of the abdomen and pelvis with IV contrast. Delayed images were obtained. Coronal and sagittal reconstructions were obtained.  Radiation dose reduction techniques included automated exposure control or exposure modulation based on body size. Count of  known CT and cardiac nuc med studies performed in previous 12 months: 5.     COMPARISON:   5/29/2019    FINDINGS:  Lung bases are clear except for a tiny left lower lobe calcified granuloma. Gallbladder is within normal limits. No biliary obstruction is seen. There are small left renal cortical cyst. There is mild-to-moderate bilateral hydronephrosis and hydroureter.  There is massive distention of the urinary bladder extending into the abdomen. Findings are all likely secondary to a bladder outlet obstruction. No stones are seen within the ureters or bladder. There is no urinary bladder wall thickening. There is  stable nodular enlargement of both  adrenal glands compatible with adenomas. Solid abdominal organs are otherwise normal. There is a moderate stool burden in the colon compatible with constipation. There are scattered colonic diverticula. There is no  evidence of acute diverticulitis. No small bowel obstruction is seen. The appendix is normal. Solid pelvic organs are within normal limits.      Impression:         1. Massive distention of the urinary bladder with bilateral hydronephrosis and hydroureter. Findings are compatible with bladder outlet obstruction.  2. Moderate colonic stool burden with colonic diverticulosis. No diverticulitis is seen.  3. No bowel obstruction. Normal appendix.          Signer Name: Samm Sparks MD   Signed: 4/15/2020 12:40 AM   Workstation Name: [facility]    [facility]                    Results for orders placed during the hospital encounter of 11/19/19   Adult Transesophageal Echo (LIZANDRO) W/ Cont if Necessary Per Protocol    Narrative · Left ventricular systolic function is normal. Estimated EF = 65%.  · Left ventricular wall thickness is consistent with hypertrophy.  · Small patent foramen ovale present. Saline test results are positive   with valsalva manuever.  · There is mild mitral valve prolapse of the anterior mitral leaflet.  · Mild tricuspid valve regurgitation is present.  · Estimated right ventricular systolic pressure from tricuspid   regurgitation is mildly elevated (35-45 mmHg).  · There is mild plaque in the descending aorta present.          Plan for Follow-up of Pending Labs/Results: To be directed to me   Order Current Status    Urine Culture - Urine, Urine, Clean Catch In process    Blood Culture - Blood, Arm, Left Preliminary result    Blood Culture - Blood, Arm, Left Preliminary result        Discharge Details        Discharge Medications      New Medications      Instructions Start Date   cefuroxime 250 MG tablet  Commonly known as:  Ceftin   250 mg, Oral, 2 Times Daily       ondansetron 4 MG tablet  Commonly known as:  ZOFRAN   4 mg, Oral, Every 6 Hours PRN      sennosides-docusate 8.6-50 MG per tablet  Commonly known as:  PERICOLACE   2 tablets, Oral, 2 Times Daily PRN         Continue These Medications      Instructions Start Date   albuterol sulfate  (90 Base) MCG/ACT inhaler  Commonly known as:  PROVENTIL HFA;VENTOLIN HFA;PROAIR HFA   2 puffs, Inhalation, Every 4 Hours PRN      amLODIPine 5 MG tablet  Commonly known as:  NORVASC   5 mg, Oral, Every 24 Hours Scheduled      aspirin 81 MG tablet   81 mg, Oral, Daily      atorvastatin 80 MG tablet  Commonly known as:  LIPITOR   80 mg, Oral, Nightly      b complex vitamins capsule   1 capsule, Oral, Daily      carbamide peroxide 6.5 % otic solution  Commonly known as:  Debrox   5 drops, Both Ears, 2 Times Daily, Use as directed; For: Impacted cerumen of both ears      dicyclomine 20 MG tablet  Commonly known as:  Bentyl   20 mg, Oral, Every 6 Hours PRN      doxazosin 1 MG tablet  Commonly known as:  CARDURA   1 mg, Oral, Nightly      FreeStyle Freedom Lite w/Device kit   1 kit, Does not apply, 3 Times Daily, TEST; For: Diabetic hypoglycemia, Diabetic peripheral neuropathy      gabapentin 400 MG capsule  Commonly known as:  NEURONTIN   400 mg, Oral, 3 Times Daily      Glucagon Emergency 1 MG injection  Generic drug:  glucagon   Use as directed; For: Diabetics mellitus      glucose blood test strip  Commonly known as:  Glucose Meter Test   Use as instructed to check blood glucose levels 3 times daily      glucose monitor monitoring kit   1 each, Does not apply, 3 Times Daily, One touch glucometer      glucose monitor monitoring kit   1 each, Does not apply, As Needed      hydroCHLOROthiazide 12.5 MG capsule  Commonly known as:  MICROZIDE   12.5 mg, Oral, Daily      Insulin Lispro 100 UNIT/ML injection pen  Commonly known as:  ADMELOG SOLOSTAR   Inject 0-14 units under skin into appropriate area based upon sliding scale 3 times  daily with meals as needed. Do not exceed 40 units per day.      Insulin NPH Isophane & Regular (70-30) 100 UNIT/ML suspension pen-injector   12 Units, Subcutaneous, 2 Times Daily With Meals      Insulin Pen Needle 32G X 4 MM misc  Commonly known as:  BD Pen Needle Cydney U/F   1 each, Oral, 4 Times Daily      Lancets misc   Use as instructed to test blood glucose levels 3 times daily.      lisinopril 40 MG tablet  Commonly known as:  PRINIVIL,ZESTRIL   40 mg, Oral, Daily      pantoprazole 40 MG EC tablet  Commonly known as:  PROTONIX   40 mg, Oral, Daily      traMADol 50 MG tablet  Commonly known as:  ULTRAM   50 mg, Oral, 2 Times Daily PRN      VITAMIN C PO   Vitamin C TABS         Stop These Medications    calcium carbonate 600 MG tablet  Commonly known as:  Calcium 600     fluconazole 100 MG tablet  Commonly known as:  DIFLUCAN     nitrofurantoin (macrocrystal-monohydrate) 100 MG capsule  Commonly known as:  MACROBID     VITAMIN B12 PO            No Known Allergies      Discharge Disposition:  Home or Self Care    Diet:  Hospital:  Diet Order   Procedures   • Diet Regular; Cardiac, Consistent Carbohydrate       Activity:      Restrictions or Other Recommendations: In and out cath every 4 hours.       CODE STATUS:    Code Status and Medical Interventions:   Ordered at: 04/15/20 0154     Level Of Support Discussed With:    Patient     Code Status:    CPR     Medical Interventions (Level of Support Prior to Arrest):    Full       No future appointments.    Additional Instructions for the Follow-ups that You Need to Schedule     Discharge Follow-up with PCP   As directed       Currently Documented PCP:    Monet Howe APRN    PCP Phone Number:    [phone]     Follow Up Details:  1 week hospital follow up               Time Spent on Discharge:  45 minutes    Electronically signed by Anay Gu II, DO, 04/16/20, 12:53 PM.      Electronically signed by Anay Gu II, DO at 04/16/20 3644           Discharge Order (From admission, onward)     Start     Ordered    04/16/20 1250  Discharge patient  Once     Expected Discharge Date:  04/16/20    Discharge Disposition:  Home or Self Care    Physician of Record for Attribution - Please select from Treatment Team:  WILMAN JESSICA II [[id number]]    Review needed by CMO to determine Physician of Record:  No       Question Answer Comment   Physician of Record for Attribution - Please select from Treatment Team WILMAN JESSICA II    Review needed by CMO to determine Physician of Record No        04/16/20 1256

## 2020-04-20 NOTE — ED PROVIDER NOTES
Subjective   Ms. Thelma Michael is a 59 y.o. female who presents to the ED with c/o urinary retention. She was seen here 4/14/20 and she was admitted for 2 days for UTI, bilateral hydronephrosis, urinary retention, and constipation. Her UTI was treated and a bowel regimen was started and she had successful bowel movements but no improvement in retention. Urology evaluated her and believe this is due neurogenic bladder from diabetes and recommended her to start self-cathing. She has been self-cathing at home since which was going well until today she experienced pain while cathing. She has not had a bowel movement in the past 3 days. She has been taking stool softeners since discharge. She reports 2 episodes of nausea and vomiting yesterday which has resolved with good appetite and fluid intake since.There are no other acute symptoms at this time.      History provided by:  Patient  Urinary Retention   Severity:  Moderate  Onset quality:  Sudden  Duration:  1 day  Timing:  Constant  Progression:  Worsening  Chronicity:  New  Relieved by:  Nothing  Worsened by:  Nothing  Associated symptoms: nausea and vomiting    Associated symptoms: no shortness of breath        Review of Systems   Respiratory: Negative for chest tightness and shortness of breath.    Gastrointestinal: Positive for constipation, nausea and vomiting.   Genitourinary: Positive for difficulty urinating.   All other systems reviewed and are negative.      Past Medical History:   Diagnosis Date   • Acid reflux    • Acute bronchitis    • Cardiac murmur    • Diabetes mellitus (CMS/[facility])    • H/O echocardiogram 08/07/2012    i. LVEF 65%.ii. Mild LVH.iii. Borderline evidence of atrial septal aneurysm.  No PFO.    • History of nuclear stress test 08/22/2014    Negative for ischemia and scars; LVEF 77%.     • Hyperlipidemia    • Hypertension    • Impacted cerumen of both ears    • Migraine    • Sinusitis    • Tobacco abuse     quit 4 days ago.     •  Urticaria        No Known Allergies    Past Surgical History:   Procedure Laterality Date   • DENTAL PROCEDURE     • NO PAST SURGERIES         Family History   Problem Relation Age of Onset   • Anxiety disorder Other    • Arthritis Other    • ADD / ADHD Other    • Heart disease Other         cardiac disorder   • Depression Other    • Diabetes Other    • Hyperlipidemia Other    • Hypertension Other    • Lung cancer Other    • Osteoporosis Other        Social History     Socioeconomic History   • Marital status:      Spouse name: Not on file   • Number of children: Not on file   • Years of education: Not on file   • Highest education level: Not on file   Tobacco Use   • Smoking status: Former Smoker     Last attempt to quit: 2019     Years since quittin.9   • Smokeless tobacco: Never Used   • Tobacco comment: BC PL never smoker    Substance and Sexual Activity   • Alcohol use: Yes     Alcohol/week: 1.0 standard drinks     Types: 1 Glasses of wine per week     Comment: ocassional   • Drug use: No   • Sexual activity: Defer     Comment: Single          Objective   Physical Exam   Constitutional: She is oriented to person, place, and time. She appears well-developed and well-nourished. No distress.   HENT:   Head: Normocephalic and atraumatic.   Nose: Nose normal.   Eyes: Conjunctivae are normal. No scleral icterus.   Neck: Normal range of motion. Neck supple.   Cardiovascular: Normal rate, regular rhythm and normal heart sounds.   No murmur heard.  Pulmonary/Chest: Effort normal and breath sounds normal. No respiratory distress.   Abdominal: Soft. There is no tenderness.   Musculoskeletal: Normal range of motion. She exhibits no edema.   Neurological: She is alert and oriented to person, place, and time.   Skin: Skin is warm and dry.   Psychiatric: She has a normal mood and affect. Her behavior is normal.   Nursing note and vitals reviewed.      Procedures         ED Course  ED Course as of 1354    Sun Apr 19, 2020   2354 Ansari catheter has been placed with 1600 mL urine output. We will leave it in place with a leg bag and plan for her to follow up with urology.    [JW]      ED Course User Index  [JW] Shashi Medley          Recent Results (from the past 24 hour(s))   Urinalysis With Culture If Indicated - Urine, Catheter    Collection Time: 04/21/20  4:37 PM   Result Value Ref Range    Color, UA Yellow Yellow, Straw    Appearance, UA Clear Clear    pH, UA 8.5 (H) 5.0 - 8.0    Specific Gravity, UA 1.012 1.001 - 1.030    Glucose, UA Negative Negative    Ketones, UA Negative Negative    Bilirubin, UA Negative Negative    Blood, UA Moderate (2+) (A) Negative    Protein,  mg/dL (2+) (A) Negative    Leuk Esterase, UA Trace (A) Negative    Nitrite, UA Negative Negative    Urobilinogen, UA 1.0 E.U./dL 0.2 - 1.0 E.U./dL   Comprehensive Metabolic Panel    Collection Time: 04/21/20  4:37 PM   Result Value Ref Range    Glucose 160 (H) 65 - 99 mg/dL    BUN 8 8 - 23 mg/dL    Creatinine 0.80 0.57 - 1.00 mg/dL    Sodium 141 136 - 145 mmol/L    Potassium 3.1 (L) 3.5 - 5.2 mmol/L    Chloride 99 98 - 107 mmol/L    CO2 29.0 22.0 - 29.0 mmol/L    Calcium 9.6 8.6 - 10.5 mg/dL    Total Protein 6.9 6.0 - 8.5 g/dL    Albumin 3.70 3.50 - 5.20 g/dL    ALT (SGPT) 37 (H) 1 - 33 U/L    AST (SGOT) 22 1 - 32 U/L    Alkaline Phosphatase 98 39 - 117 U/L    Total Bilirubin 0.3 0.2 - 1.2 mg/dL    eGFR  African Amer 88 >60 mL/min/1.73    Globulin 3.2 gm/dL    A/G Ratio 1.2 g/dL    BUN/Creatinine Ratio 10.0 7.0 - 25.0    Anion Gap 13.0 5.0 - 15.0 mmol/L   CBC Auto Differential    Collection Time: 04/21/20  4:37 PM   Result Value Ref Range    WBC 7.49 3.40 - 10.80 10*3/mm3    RBC 4.41 3.77 - 5.28 10*6/mm3    Hemoglobin 13.7 12.0 - 15.9 g/dL    Hematocrit 39.8 34.0 - 46.6 %    MCV 90.2 79.0 - 97.0 fL    MCH 31.1 26.6 - 33.0 pg    MCHC 34.4 31.5 - 35.7 g/dL    RDW 12.2 (L) 12.3 - 15.4 %    RDW-SD 40.2 37.0 - 54.0 fl    MPV 10.7 6.0 - 12.0  fL    Platelets 336 140 - 450 10*3/mm3    Neutrophil % 59.8 42.7 - 76.0 %    Lymphocyte % 28.6 19.6 - 45.3 %    Monocyte % 8.7 5.0 - 12.0 %    Eosinophil % 2.1 0.3 - 6.2 %    Basophil % 0.5 0.0 - 1.5 %    Immature Grans % 0.3 0.0 - 0.5 %    Neutrophils, Absolute 4.48 1.70 - 7.00 10*3/mm3    Lymphocytes, Absolute 2.14 0.70 - 3.10 10*3/mm3    Monocytes, Absolute 0.65 0.10 - 0.90 10*3/mm3    Eosinophils, Absolute 0.16 0.00 - 0.40 10*3/mm3    Basophils, Absolute 0.04 0.00 - 0.20 10*3/mm3    Immature Grans, Absolute 0.02 0.00 - 0.05 10*3/mm3    nRBC 0.0 0.0 - 0.2 /100 WBC   Urinalysis, Microscopic Only - Urine, Catheter    Collection Time: 04/21/20  4:37 PM   Result Value Ref Range    RBC, UA Too Numerous to Count (A) None Seen, 0-2 /HPF    WBC, UA 6-12 (A) None Seen, 0-2 /HPF    Bacteria, UA None Seen None Seen, Trace /HPF    Squamous Epithelial Cells, UA 0-2 None Seen, 0-2 /HPF    Hyaline Casts, UA 0-6 0 - 6 /LPF    Methodology Automated Microscopy    Urine Culture - Urine, Urine, Catheter    Collection Time: 04/21/20  4:37 PM   Result Value Ref Range    Urine Culture No growth      Note: In addition to lab results from this visit, the labs listed above may include labs taken at another facility or during a different encounter within the last 24 hours. Please correlate lab times with ED admission and discharge times for further clarification of the services performed during this visit.    XR Abdomen 2+ VW with Chest 1 VW   Final Result   No acute findings. Nonobstructive bowel gas pattern.      Signer Name: Samm Sparks MD    Signed: 4/20/2020 1:07 AM    Workstation Name: [email or web address]     [facility]        Vitals:    04/20/20 0355 04/20/20 0400 04/20/20 0418 04/20/20 0427   BP:  154/65     Pulse: 73 76 81    Resp:    16   Temp:       TempSrc:       SpO2: 100% 100% 100%    Weight:       Height:         Medications   insulin regular (humuLIN R,novoLIN R) injection 6 Units (6 Units Intravenous  Given 4/20/20 9982)     ECG/EMG Results (last 24 hours)     ** No results found for the last 24 hours. **        No orders to display                                     Results for ALISA SMILEY (MRN [id number]) as of 4/22/2020 13:54   Ref. Range 4/20/2020 01:17   Glucose Latest Ref Range: 65 - 99 mg/dL 464 (C)   Sodium Latest Ref Range: 136 - 145 mmol/L 143   Potassium Latest Ref Range: 3.5 - 5.2 mmol/L 3.8   CO2 Latest Ref Range: 22.0 - 29.0 mmol/L 29.0   Chloride Latest Ref Range: 98 - 107 mmol/L 101   Anion Gap Latest Ref Range: 5.0 - 15.0 mmol/L 13.0   Creatinine Latest Ref Range: 0.57 - 1.00 mg/dL 0.93   BUN Latest Ref Range: 8 - 23 mg/dL 15   BUN/Creatinine Ratio Latest Ref Range: 7.0 - 25.0  16.1   Calcium Latest Ref Range: 8.6 - 10.5 mg/dL 9.2   eGFR African Am Latest Ref Range: >60 mL/min/1.73 74   Alkaline Phosphatase Latest Ref Range: 39 - 117 U/L 118 (H)   Total Protein Latest Ref Range: 6.0 - 8.5 g/dL 6.9   ALT (SGPT) Latest Ref Range: 1 - 33 U/L 48 (H)   AST (SGOT) Latest Ref Range: 1 - 32 U/L 42 (H)   Total Bilirubin Latest Ref Range: 0.2 - 1.2 mg/dL <0.2 (L)   Albumin Latest Ref Range: 3.50 - 5.20 g/dL 3.80   Globulin Latest Units: gm/dL 3.1   A/G Ratio Latest Units: g/dL 1.2   WBC Latest Ref Range: 3.40 - 10.80 10*3/mm3 8.81   RBC Latest Ref Range: 3.77 - 5.28 10*6/mm3 4.19   Hemoglobin Latest Ref Range: 12.0 - 15.9 g/dL 12.7   Hematocrit Latest Ref Range: 34.0 - 46.6 % 38.3   RDW Latest Ref Range: 12.3 - 15.4 % 12.5   MCV Latest Ref Range: 79.0 - 97.0 fL 91.4   MCH Latest Ref Range: 26.6 - 33.0 pg 30.3   MCHC Latest Ref Range: 31.5 - 35.7 g/dL 33.2   MPV Latest Ref Range: 6.0 - 12.0 fL 11.0   Platelets Latest Ref Range: 140 - 450 10*3/mm3 320   RDW-SD Latest Ref Range: 37.0 - 54.0 fl 41.4   Neutrophil Rel % Latest Ref Range: 42.7 - 76.0 % 73.0   Lymphocyte Rel % Latest Ref Range: 19.6 - 45.3 % 17.1 (L)   Monocyte Rel % Latest Ref Range: 5.0 - 12.0 % 7.2   Eosinophil Rel % Latest Ref  Range: 0.3 - 6.2 % 2.0   Basophil Rel % Latest Ref Range: 0.0 - 1.5 % 0.5   Immature Granulocyte Rel % Latest Ref Range: 0.0 - 0.5 % 0.2   Neutrophils Absolute Latest Ref Range: 1.70 - 7.00 10*3/mm3 6.43   Lymphocytes Absolute Latest Ref Range: 0.70 - 3.10 10*3/mm3 1.51   Monocytes Absolute Latest Ref Range: 0.10 - 0.90 10*3/mm3 0.63   Eosinophils Absolute Latest Ref Range: 0.00 - 0.40 10*3/mm3 0.18   Basophils Absolute Latest Ref Range: 0.00 - 0.20 10*3/mm3 0.04   Immature Grans, Absolute Latest Ref Range: 0.00 - 0.05 10*3/mm3 0.02   nRBC Latest Ref Range: 0.0 - 0.2 /100 WBC 0.0   Results for ALISA SMILEY (MRN [id number]) as of 4/22/2020 13:54   Ref. Range 4/19/2020 23:13   Color, UA Latest Ref Range: Yellow, Straw  Yellow   Appearance, UA Latest Ref Range: Clear  Cloudy (A)   Specific [place], UA Latest Ref Range: 1.001 - 1.030  1.022   pH, UA Latest Ref Range: 5.0 - 8.0  6.5   Glucose Latest Ref Range: Negative  >=1000 mg/dL (3+) (A)   Ketones, UA Latest Ref Range: Negative  Negative   Blood, UA Latest Ref Range: Negative  Negative   Nitrite, UA Latest Ref Range: Negative  Negative   Leukocytes, UA Latest Ref Range: Negative  Negative   Protein, UA Latest Ref Range: Negative  Negative   Bilirubin, UA Latest Ref Range: Negative  Negative   Urobilinogen, UA Latest Ref Range: 0.2 - 1.0 E.U./dL  0.2 E.U./dL   RBC, UA Latest Ref Range: None Seen, 0-2 /HPF 0-2   WBC, UA Latest Ref Range: None Seen, 0-2 /HPF 0-2   Bacteria, UA Latest Ref Range: None Seen, Trace /HPF None Seen   Yeast Latest Ref Range: None Seen /HPF Large/3+ Budding Yeast   Squamous Epithelial Cells, UA Latest Ref Range: None Seen, 0-2 /HPF None Seen   Hyaline Casts, UA Latest Ref Range: 0 - 6 /LPF None Seen   Methodology: Unknown Manual Light Microscopy     MDM    Final diagnoses:   Urinary retention   Hyperglycemia   Constipation, unspecified constipation type   Elevated liver enzymes       Documentation assistance provided by mylene GARCIA  Galindo.  Information recorded by the scribe was done at my direction and has been verified and validated by me.     Shashi Medley  04/20/20 0007       Abisai Pierson DO  04/22/20 6484

## 2020-04-21 ENCOUNTER — APPOINTMENT (OUTPATIENT)
Dept: CT IMAGING | Facility: HOSPITAL | Age: 60
End: 2020-04-21

## 2020-04-21 ENCOUNTER — HOSPITAL ENCOUNTER (EMERGENCY)
Facility: HOSPITAL | Age: 60
Discharge: HOME | End: 2020-04-21
Attending: EMERGENCY MEDICINE | Admitting: EMERGENCY MEDICINE

## 2020-04-21 ENCOUNTER — OFFICE VISIT (OUTPATIENT)
Dept: INTERNAL MEDICINE | Facility: CLINIC | Age: 60
End: 2020-04-21

## 2020-04-21 VITALS
HEART RATE: 92 BPM | OXYGEN SATURATION: 100 % | RESPIRATION RATE: 16 BRPM | HEIGHT: 69 IN | BODY MASS INDEX: 19.26 KG/M2 | WEIGHT: 130 LBS | TEMPERATURE: 98.8 F | SYSTOLIC BLOOD PRESSURE: 165 MMHG | DIASTOLIC BLOOD PRESSURE: 97 MMHG

## 2020-04-21 DIAGNOSIS — Z86.79 HISTORY OF HYPERTENSION: ICD-10-CM

## 2020-04-21 DIAGNOSIS — R33.9 URINARY RETENTION: ICD-10-CM

## 2020-04-21 DIAGNOSIS — Z87.448 HISTORY OF NEUROGENIC BLADDER: ICD-10-CM

## 2020-04-21 DIAGNOSIS — Z79.4 TYPE 2 DIABETES MELLITUS WITH HYPERGLYCEMIA, WITH LONG-TERM CURRENT USE OF INSULIN (HCC): Primary | ICD-10-CM

## 2020-04-21 DIAGNOSIS — E87.6 HYPOKALEMIA: ICD-10-CM

## 2020-04-21 DIAGNOSIS — R33.8 ACUTE URINARY RETENTION: Primary | ICD-10-CM

## 2020-04-21 DIAGNOSIS — Z79.4 TYPE 2 DIABETES MELLITUS WITHOUT COMPLICATION, WITH LONG-TERM CURRENT USE OF INSULIN (HCC): ICD-10-CM

## 2020-04-21 DIAGNOSIS — E11.9 TYPE 2 DIABETES MELLITUS WITHOUT COMPLICATION, WITH LONG-TERM CURRENT USE OF INSULIN (HCC): ICD-10-CM

## 2020-04-21 DIAGNOSIS — E11.65 TYPE 2 DIABETES MELLITUS WITH HYPERGLYCEMIA, WITH LONG-TERM CURRENT USE OF INSULIN (HCC): Primary | ICD-10-CM

## 2020-04-21 LAB
ALBUMIN SERPL-MCNC: 3.7 G/DL (ref 3.5–5.2)
ALBUMIN/GLOB SERPL: 1.2 G/DL
ALP SERPL-CCNC: 98 U/L (ref 39–117)
ALT SERPL W P-5'-P-CCNC: 37 U/L (ref 1–33)
ANION GAP SERPL CALCULATED.3IONS-SCNC: 13 MMOL/L (ref 5–15)
AST SERPL-CCNC: 22 U/L (ref 1–32)
BACTERIA UR QL AUTO: ABNORMAL /HPF
BASOPHILS # BLD AUTO: 0.04 10*3/MM3 (ref 0–0.2)
BASOPHILS NFR BLD AUTO: 0.5 % (ref 0–1.5)
BILIRUB SERPL-MCNC: 0.3 MG/DL (ref 0.2–1.2)
BILIRUB UR QL STRIP: NEGATIVE
BUN BLD-MCNC: 8 MG/DL (ref 8–23)
BUN/CREAT SERPL: 10 (ref 7–25)
CALCIUM SPEC-SCNC: 9.6 MG/DL (ref 8.6–10.5)
CHLORIDE SERPL-SCNC: 99 MMOL/L (ref 98–107)
CLARITY UR: CLEAR
CO2 SERPL-SCNC: 29 MMOL/L (ref 22–29)
COLOR UR: YELLOW
CREAT BLD-MCNC: 0.8 MG/DL (ref 0.57–1)
DEPRECATED RDW RBC AUTO: 40.2 FL (ref 37–54)
EOSINOPHIL # BLD AUTO: 0.16 10*3/MM3 (ref 0–0.4)
EOSINOPHIL NFR BLD AUTO: 2.1 % (ref 0.3–6.2)
ERYTHROCYTE [DISTWIDTH] IN BLOOD BY AUTOMATED COUNT: 12.2 % (ref 12.3–15.4)
GFR SERPL CREATININE-BSD FRML MDRD: 88 ML/MIN/1.73
GLOBULIN UR ELPH-MCNC: 3.2 GM/DL
GLUCOSE BLD-MCNC: 160 MG/DL (ref 65–99)
GLUCOSE UR STRIP-MCNC: NEGATIVE MG/DL
HCT VFR BLD AUTO: 39.8 % (ref 34–46.6)
HGB BLD-MCNC: 13.7 G/DL (ref 12–15.9)
HGB UR QL STRIP.AUTO: ABNORMAL
HYALINE CASTS UR QL AUTO: ABNORMAL /LPF
IMM GRANULOCYTES # BLD AUTO: 0.02 10*3/MM3 (ref 0–0.05)
IMM GRANULOCYTES NFR BLD AUTO: 0.3 % (ref 0–0.5)
KETONES UR QL STRIP: NEGATIVE
LEUKOCYTE ESTERASE UR QL STRIP.AUTO: ABNORMAL
LYMPHOCYTES # BLD AUTO: 2.14 10*3/MM3 (ref 0.7–3.1)
LYMPHOCYTES NFR BLD AUTO: 28.6 % (ref 19.6–45.3)
MCH RBC QN AUTO: 31.1 PG (ref 26.6–33)
MCHC RBC AUTO-ENTMCNC: 34.4 G/DL (ref 31.5–35.7)
MCV RBC AUTO: 90.2 FL (ref 79–97)
MONOCYTES # BLD AUTO: 0.65 10*3/MM3 (ref 0.1–0.9)
MONOCYTES NFR BLD AUTO: 8.7 % (ref 5–12)
NEUTROPHILS # BLD AUTO: 4.48 10*3/MM3 (ref 1.7–7)
NEUTROPHILS NFR BLD AUTO: 59.8 % (ref 42.7–76)
NITRITE UR QL STRIP: NEGATIVE
NRBC BLD AUTO-RTO: 0 /100 WBC (ref 0–0.2)
PH UR STRIP.AUTO: 8.5 [PH] (ref 5–8)
PLATELET # BLD AUTO: 336 10*3/MM3 (ref 140–450)
PMV BLD AUTO: 10.7 FL (ref 6–12)
POTASSIUM BLD-SCNC: 3.1 MMOL/L (ref 3.5–5.2)
PROT SERPL-MCNC: 6.9 G/DL (ref 6–8.5)
PROT UR QL STRIP: ABNORMAL
RBC # BLD AUTO: 4.41 10*6/MM3 (ref 3.77–5.28)
RBC # UR: ABNORMAL /HPF
REF LAB TEST METHOD: ABNORMAL
SODIUM BLD-SCNC: 141 MMOL/L (ref 136–145)
SP GR UR STRIP: 1.01 (ref 1–1.03)
SQUAMOUS #/AREA URNS HPF: ABNORMAL /HPF
UROBILINOGEN UR QL STRIP: ABNORMAL
WBC NRBC COR # BLD: 7.49 10*3/MM3 (ref 3.4–10.8)
WBC UR QL AUTO: ABNORMAL /HPF

## 2020-04-21 PROCEDURE — 99214 OFFICE O/P EST MOD 30 MIN: CPT | Performed by: NURSE PRACTITIONER

## 2020-04-21 PROCEDURE — 81001 URINALYSIS AUTO W/SCOPE: CPT | Performed by: PHYSICIAN ASSISTANT

## 2020-04-21 PROCEDURE — 25010000002 MORPHINE PER 10 MG: Performed by: EMERGENCY MEDICINE

## 2020-04-21 PROCEDURE — 51702 INSERT TEMP BLADDER CATH: CPT

## 2020-04-21 PROCEDURE — 96374 THER/PROPH/DIAG INJ IV PUSH: CPT

## 2020-04-21 PROCEDURE — 25010000002 ONDANSETRON PER 1 MG: Performed by: EMERGENCY MEDICINE

## 2020-04-21 PROCEDURE — 99284 EMERGENCY DEPT VISIT MOD MDM: CPT

## 2020-04-21 PROCEDURE — 87086 URINE CULTURE/COLONY COUNT: CPT | Performed by: PHYSICIAN ASSISTANT

## 2020-04-21 PROCEDURE — 96375 TX/PRO/DX INJ NEW DRUG ADDON: CPT

## 2020-04-21 PROCEDURE — 74176 CT ABD & PELVIS W/O CONTRAST: CPT

## 2020-04-21 PROCEDURE — 80053 COMPREHEN METABOLIC PANEL: CPT | Performed by: PHYSICIAN ASSISTANT

## 2020-04-21 PROCEDURE — 85025 COMPLETE CBC W/AUTO DIFF WBC: CPT | Performed by: PHYSICIAN ASSISTANT

## 2020-04-21 RX ORDER — INSULIN GLARGINE 100 [IU]/ML
20 INJECTION, SOLUTION SUBCUTANEOUS NIGHTLY
Qty: 5 PEN | Refills: 2 | OUTPATIENT
Start: 2020-04-21 | End: 2020-04-21

## 2020-04-21 RX ORDER — SODIUM CHLORIDE 0.9 % (FLUSH) 0.9 %
10 SYRINGE (ML) INJECTION AS NEEDED
Status: DISCONTINUED | OUTPATIENT
Start: 2020-04-21 | End: 2020-04-21 | Stop reason: HOSPADM

## 2020-04-21 RX ORDER — ONDANSETRON 2 MG/ML
4 INJECTION INTRAMUSCULAR; INTRAVENOUS ONCE
Status: COMPLETED | OUTPATIENT
Start: 2020-04-21 | End: 2020-04-21

## 2020-04-21 RX ORDER — POTASSIUM CHLORIDE 750 MG/1
40 CAPSULE, EXTENDED RELEASE ORAL ONCE
Status: COMPLETED | OUTPATIENT
Start: 2020-04-21 | End: 2020-04-21

## 2020-04-21 RX ORDER — PHENAZOPYRIDINE HYDROCHLORIDE 100 MG/1
200 TABLET, FILM COATED ORAL ONCE
Status: COMPLETED | OUTPATIENT
Start: 2020-04-21 | End: 2020-04-21

## 2020-04-21 RX ORDER — MORPHINE SULFATE 2 MG/ML
2 INJECTION, SOLUTION INTRAMUSCULAR; INTRAVENOUS ONCE
Status: COMPLETED | OUTPATIENT
Start: 2020-04-21 | End: 2020-04-21

## 2020-04-21 RX ADMIN — MORPHINE SULFATE 2 MG: 2 INJECTION, SOLUTION INTRAMUSCULAR; INTRAVENOUS at 16:35

## 2020-04-21 RX ADMIN — ONDANSETRON 4 MG: 2 INJECTION INTRAMUSCULAR; INTRAVENOUS at 16:34

## 2020-04-21 RX ADMIN — POTASSIUM CHLORIDE 40 MEQ: 10 CAPSULE, COATED, EXTENDED RELEASE ORAL at 18:27

## 2020-04-21 RX ADMIN — PHENAZOPYRIDINE HYDROCHLORIDE 200 MG: 100 TABLET ORAL at 16:36

## 2020-04-21 RX ADMIN — SODIUM CHLORIDE 500 ML: 9 INJECTION, SOLUTION INTRAVENOUS at 16:33

## 2020-04-21 NOTE — ED PROVIDER NOTES
Subjective   This is a 59-year-old female that presents to the ER with acute urinary retention despite Ansari catheterization.  Patient was admitted to our facility from 4/14/2020 through 4/16/2020 for acute urinary retention, bilateral hydronephrosis, and type 2 diabetes mellitus with hyperglycemia.  Ms. Michael is a 59-year-old female with past medical history of uncontrolled diabetes mellitus, hypertension, hyperlipidemia, and tobacco use who had progressive worsening lower abdominal pain.  CT scan of the abdomen and pelvis showed massive distention of the urinary bladder with bilateral hydronephrosis and hydroureter compatible with bladder outlet obstruction and moderate colonic stool burden with colonic diverticulosis.  She received a bowel regimen with resolution of her constipation but no improvement in retention.  Urology evaluated patient during the admission and felt that symptoms were most likely related to neurogenic bladder related to diabetes and recommended self-catheterization at home.  She received Rocephin IV x2 days and was transitioned to oral Ceftin for 3 days at discharge.  Urine culture from 4/14/2020 showed normal growth.  Patient was seen again on 4/19/2024 for increased pain with self-catheterization.  According to patient, she was getting smaller and smaller amounts of urine output with catheterizing herself.  She reported bladder distention and discomfort.  Patient had a leg bag catheter placed and she was discharged to home with indwelling Ansari.  She was recommended to call Dr. Eden, urologist, for close follow-up.  Patient has not called his office yet.  This morning, patient starting having increased suprapubic abdominal pressure and discomfort, as well as pain at insertion site of catheter.  She says that she feels the same as when she was obstructed.  She emptied out her catheter bag approximately 3 hours ago and has had very little output ever since.  She is very uncomfortable.  She  denies any nausea or vomiting.  She denies any fever or chills.  She denies any flank or CVA pain.  No other concerns at this time.      History provided by:  Patient  Urinary Retention   Severity:  Moderate  Onset quality:  Sudden  Duration:  3 hours  Timing:  Constant  Progression:  Worsening  Chronicity:  Recurrent  Context:  History of neurogenic bladder from diabetes.  Recent admission from 4/14 through 4/16/2020.  Patient was advised to self catheterize herself.  Increased difficulty with self cath and patient returned to ER on 4/19/2020 with Jo placed on discharge.  Relieved by:  Nothing  Worsened by:  Nothing  Ineffective treatments:  Jo catheter  Associated symptoms: abdominal pain (suprapubic pressure/discomfort)    Associated symptoms: no chest pain, no diarrhea, no fever, no nausea and no vomiting        Review of Systems   Constitutional: Negative for appetite change, chills, diaphoresis and fever.   Cardiovascular: Negative for chest pain.   Gastrointestinal: Positive for abdominal pain (suprapubic pressure/discomfort). Negative for diarrhea, nausea and vomiting.   Genitourinary: Positive for decreased urine volume.        History of neurogenic bladder.  Indwelling jo at present with decreased urinary output and bladder distention.   Musculoskeletal: Negative for back pain.        No CVA pain.   All other systems reviewed and are negative.      Past Medical History:   Diagnosis Date   • Acid reflux    • Acute bronchitis    • Cardiac murmur    • Diabetes mellitus (CMS/[facility])    • H/O echocardiogram 08/07/2012    i. LVEF 65%.ii. Mild LVH.iii. Borderline evidence of atrial septal aneurysm.  No PFO.    • History of nuclear stress test 08/22/2014    Negative for ischemia and scars; LVEF 77%.     • Hyperlipidemia    • Hypertension    • Impacted cerumen of both ears    • Migraine    • Sinusitis    • Tobacco abuse     quit 4 days ago.     • Urticaria        No Known Allergies    Past Surgical History:    Procedure Laterality Date   • DENTAL PROCEDURE     • NO PAST SURGERIES         Family History   Problem Relation Age of Onset   • Anxiety disorder Other    • Arthritis Other    • ADD / ADHD Other    • Heart disease Other         cardiac disorder   • Depression Other    • Diabetes Other    • Hyperlipidemia Other    • Hypertension Other    • Lung cancer Other    • Osteoporosis Other        Social History     Socioeconomic History   • Marital status:      Spouse name: Not on file   • Number of children: Not on file   • Years of education: Not on file   • Highest education level: Not on file   Tobacco Use   • Smoking status: Former Smoker     Last attempt to quit: 2019     Years since quittin.9   • Smokeless tobacco: Never Used   • Tobacco comment: BC PL never smoker    Substance and Sexual Activity   • Alcohol use: Yes     Alcohol/week: 1.0 standard drinks     Types: 1 Glasses of wine per week     Comment: ocassional   • Drug use: No   • Sexual activity: Defer     Comment: Single            Objective   Physical Exam   Constitutional: She is oriented to person, place, and time. She appears well-developed and well-nourished. No distress.   Patient is anxious and uncomfortable secondary to abdominal discomfort.   HENT:   Head: Normocephalic and atraumatic.   Mouth/Throat: Oropharynx is clear and moist.   Eyes: Pupils are equal, round, and reactive to light. Conjunctivae and EOM are normal. No scleral icterus.   Neck: Normal range of motion. Neck supple.   Cardiovascular: Normal rate, regular rhythm and normal heart sounds.   Pulmonary/Chest: Effort normal and breath sounds normal. No respiratory distress.   Abdominal: Soft. Bowel sounds are normal. She exhibits distension. There is tenderness in the suprapubic area. There is no CVA tenderness.       Positive bladder distention with suprapubic tenderness.  No rebound or guarding.  No flank or CVA tenderness.  Active bowel sounds.  After Ansari catheter  was changed, patient started draining urine and bladder distention improved.  Before discharge, abdominal exam is benign and nontender.   Musculoskeletal: Normal range of motion. She exhibits no edema.   Lymphadenopathy:     She has no cervical adenopathy.   Neurological: She is alert and oriented to person, place, and time.   Skin: Skin is warm and dry. She is not diaphoretic.   Psychiatric: She has a normal mood and affect. Her behavior is normal.   Nursing note and vitals reviewed.      Procedures           ED Course  ED Course as of Apr 21 1837   Tue Apr 21, 2020   1506 Nursing staff went to replace Ansari catheter.  Catheter was placed and there was very little urine output and increased discomfort.  We anchored it and will repeat CT scan of the abdomen and pelvis for further evaluation since patient had significant bladder distention with bilateral hydronephrosis on recent CAT scan.  Patient very uncomfortable.  I ordered morphine and Zofran and Pyridium.    [FC]   5154 Patient feeling much better after changing the catheter.  She denies any abdominal discomfort or bladder distention.  Urine is draining well and catheter bag.  I paged on-call urology, Dr. Grimaldo, to discuss the case.  CT findings showed wall thickening of the bladder with incomplete distention.  When compared to prior study there is decreased seen in the bladder outlet obstruction of the renal collecting system.  There is only minimal dilatation on today's examination.  The remainder of the abdomen and pelvis is grossly unremarkable.  Scattered stool throughout the colon.  Dr. Grimaldo recommended patient to contact Dr. Eden's office in the morning for close follow-up.  There are medications that can help with neurogenic bladder as well as Interstim.  CBC was within normal limits.  Chemistries revealed potassium of 3.1.  I ordered KCl 40 mEq by mouth here in the ER.  Urinalysis revealed too numerous to count red blood cells, trace leukocytes,  negative nitrite.  There was no bacteria.  I updated patient on all results and recommendations for follow-up.  She says that she has been talking with the office staff of Dr. Eden and they said that they should be able to get her into their office tomorrow.  Vital signs are stable.  Return to the ER if worsening symptoms.  Continue with all other current medical management.    [FC]      ED Course User Index  [FC] Diana Calderón, CHRIS           Recent Results (from the past 24 hour(s))   Urinalysis With Culture If Indicated - Urine, Catheter    Collection Time: 04/21/20  4:37 PM   Result Value Ref Range    Color, UA Yellow Yellow, Straw    Appearance, UA Clear Clear    pH, UA 8.5 (H) 5.0 - 8.0    Specific Gravity, UA 1.012 1.001 - 1.030    Glucose, UA Negative Negative    Ketones, UA Negative Negative    Bilirubin, UA Negative Negative    Blood, UA Moderate (2+) (A) Negative    Protein,  mg/dL (2+) (A) Negative    Leuk Esterase, UA Trace (A) Negative    Nitrite, UA Negative Negative    Urobilinogen, UA 1.0 E.U./dL 0.2 - 1.0 E.U./dL   Comprehensive Metabolic Panel    Collection Time: 04/21/20  4:37 PM   Result Value Ref Range    Glucose 160 (H) 65 - 99 mg/dL    BUN 8 8 - 23 mg/dL    Creatinine 0.80 0.57 - 1.00 mg/dL    Sodium 141 136 - 145 mmol/L    Potassium 3.1 (L) 3.5 - 5.2 mmol/L    Chloride 99 98 - 107 mmol/L    CO2 29.0 22.0 - 29.0 mmol/L    Calcium 9.6 8.6 - 10.5 mg/dL    Total Protein 6.9 6.0 - 8.5 g/dL    Albumin 3.70 3.50 - 5.20 g/dL    ALT (SGPT) 37 (H) 1 - 33 U/L    AST (SGOT) 22 1 - 32 U/L    Alkaline Phosphatase 98 39 - 117 U/L    Total Bilirubin 0.3 0.2 - 1.2 mg/dL    eGFR  African Amer 88 >60 mL/min/1.73    Globulin 3.2 gm/dL    A/G Ratio 1.2 g/dL    BUN/Creatinine Ratio 10.0 7.0 - 25.0    Anion Gap 13.0 5.0 - 15.0 mmol/L   CBC Auto Differential    Collection Time: 04/21/20  4:37 PM   Result Value Ref Range    WBC 7.49 3.40 - 10.80 10*3/mm3    RBC 4.41 3.77 - 5.28 10*6/mm3    Hemoglobin 13.7  12.0 - 15.9 g/dL    Hematocrit 39.8 34.0 - 46.6 %    MCV 90.2 79.0 - 97.0 fL    MCH 31.1 26.6 - 33.0 pg    MCHC 34.4 31.5 - 35.7 g/dL    RDW 12.2 (L) 12.3 - 15.4 %    RDW-SD 40.2 37.0 - 54.0 fl    MPV 10.7 6.0 - 12.0 fL    Platelets 336 140 - 450 10*3/mm3    Neutrophil % 59.8 42.7 - 76.0 %    Lymphocyte % 28.6 19.6 - 45.3 %    Monocyte % 8.7 5.0 - 12.0 %    Eosinophil % 2.1 0.3 - 6.2 %    Basophil % 0.5 0.0 - 1.5 %    Immature Grans % 0.3 0.0 - 0.5 %    Neutrophils, Absolute 4.48 1.70 - 7.00 10*3/mm3    Lymphocytes, Absolute 2.14 0.70 - 3.10 10*3/mm3    Monocytes, Absolute 0.65 0.10 - 0.90 10*3/mm3    Eosinophils, Absolute 0.16 0.00 - 0.40 10*3/mm3    Basophils, Absolute 0.04 0.00 - 0.20 10*3/mm3    Immature Grans, Absolute 0.02 0.00 - 0.05 10*3/mm3    nRBC 0.0 0.0 - 0.2 /100 WBC   Urinalysis, Microscopic Only - Urine, Catheter    Collection Time: 04/21/20  4:37 PM   Result Value Ref Range    RBC, UA Too Numerous to Count (A) None Seen, 0-2 /HPF    WBC, UA 6-12 (A) None Seen, 0-2 /HPF    Bacteria, UA None Seen None Seen, Trace /HPF    Squamous Epithelial Cells, UA 0-2 None Seen, 0-2 /HPF    Hyaline Casts, UA 0-6 0 - 6 /LPF    Methodology Automated Microscopy      Note: In addition to lab results from this visit, the labs listed above may include labs taken at another facility or during a different encounter within the last 24 hours. Please correlate lab times with ED admission and discharge times for further clarification of the services performed during this visit.    CT Abdomen Pelvis Without Contrast   Final Result   Ansari catheter balloon identified within the bladder with   some air. Wall thickening of the bladder with incomplete distention.   Clinical correlation is needed. When compared to the prior study there   is decrease seen in the obstruction of the renal collecting systems   bilaterally with only minimal dilatation on today's examination. The   remainder of the abdomen and pelvis is grossly  "unremarkable. Stool   scattered throughout the colon. The lung bases are clear with no   features of pneumonia.       D:  04/21/2020   E:  04/21/2020       This report was finalized on 4/21/2020 5:36 PM by Dr. Maribel Medina MD.            Vitals:    04/21/20 1413 04/21/20 1637 04/21/20 1745   BP: 108/79 (!) 173/103 151/86   BP Location: Right arm     Patient Position: Sitting     Pulse: 92     Resp: 16     Temp: 98.8 °F (37.1 °C)     TempSrc: Oral     SpO2: 99% 100% 100%   Weight: 59 kg (130 lb)     Height: 174 cm (68.5\")       Medications   sodium chloride 0.9 % flush 10 mL (has no administration in time range)   phenazopyridine (PYRIDIUM) tablet 200 mg (200 mg Oral Given 4/21/20 1636)   sodium chloride 0.9 % bolus 500 mL (0 mL Intravenous Stopped 4/21/20 1703)   morphine injection 2 mg (2 mg Intravenous Given 4/21/20 1635)   ondansetron (ZOFRAN) injection 4 mg (4 mg Intravenous Given 4/21/20 1634)   potassium chloride (MICRO-K) CR capsule 40 mEq (40 mEq Oral Given 4/21/20 1827)     ECG/EMG Results (last 24 hours)     ** No results found for the last 24 hours. **        No orders to display                                       MDM    Final diagnoses:   Acute urinary retention   History of neurogenic bladder   Type 2 diabetes mellitus without complication, with long-term current use of insulin (CMS/[facility])   History of hypertension   Hypokalemia            Diana Calderón PA-C  04/21/20 1837    "

## 2020-04-21 NOTE — DISCHARGE INSTRUCTIONS
ER evaluation reveals recurrent urinary retention in a patient with history of neurogenic bladder.  We changed out Ansari catheter and patient is urinating well.  CT scan of the abdomen and pelvis shows improvement in bladder distention and hydronephrosis.  Discussed the case in detail with Dr. Grimaldo, on-call urology.  He recommends first available follow-up with Dr. Eden, for treatment plan options for neurogenic bladder.  There are medications that can help, as well as Interstem.  Continue with all other current medical management.  Labs and urinalysis were within normal limits except for mild decrease in potassium.  We gave oral supplementation and will provide patient information on potassium content in foods.  Return to the ER if worsening symptoms.

## 2020-04-22 LAB — BACTERIA SPEC AEROBE CULT: NO GROWTH

## 2020-04-23 ENCOUNTER — READMISSION MANAGEMENT (OUTPATIENT)
Dept: CALL CENTER | Facility: HOSPITAL | Age: 60
End: 2020-04-23

## 2020-04-23 NOTE — OUTREACH NOTE
Medical Week 2 Survey      Responses   [facility] patient discharged from?  [place]   COVID-19 Test Status  Not tested   Does the patient have one of the following disease processes/diagnoses(primary or secondary)?  Other   Week 2 attempt successful?  Yes   Call start time  1721   Discharge diagnosis  Acute urinary retention Bilateral hydronephrosis   RAFAT   Call end time  1724   Meds reviewed with patient/caregiver?  Yes   Is the patient having any side effects they believe may be caused by any medication additions or changes?  No   Is the patient taking all medications as directed (includes completed medication regime)?  Yes   Does the patient have a primary care provider?   Yes   Does the patient have an appointment with their PCP within 7 days of discharge?  Yes   Has the patient kept scheduled appointments due by today?  Yes   Comments  Self cath now, catheter out.  Yeast infection now.   What is the Home health agency?   [facility]    Has home health visited the patient within 72 hours of discharge?  Yes   Psychosocial issues?  No   Did the patient receive a copy of their discharge instructions?  Yes   Nursing interventions  Reviewed instructions with patient   What is the patient's perception of their health status since discharge?  Improving   Is the patient/caregiver able to teach back signs and symptoms related to disease process for when to call PCP?  Yes   Is the patient/caregiver able to teach back signs and symptoms related to disease process for when to call 911?  Yes   Is the patient/caregiver able to teach back the hierarchy of who to call/visit for symptoms/problems? PCP, Specialist, Home health nurse, Urgent Care, ED, 911  Yes   Additional teach back comments  Encouraged protein and NPO for two hours after vomiting.   Week 2 Call Completed?  Yes          Alejandra Pugh RN

## 2020-04-24 ENCOUNTER — TELEPHONE (OUTPATIENT)
Dept: INTERNAL MEDICINE | Facility: CLINIC | Age: 60
End: 2020-04-24

## 2020-04-24 NOTE — TELEPHONE ENCOUNTER
Contact patient and try to get her set up for telephone visit. I recently changed her insulin and am concerned about how she is doing. Thanks.

## 2020-04-24 NOTE — TELEPHONE ENCOUNTER
I called the first number and they said that I had the wrong number. I then called the [phone] and no answered the phone.

## 2020-04-28 ENCOUNTER — READMISSION MANAGEMENT (OUTPATIENT)
Dept: CALL CENTER | Facility: HOSPITAL | Age: 60
End: 2020-04-28

## 2020-04-28 RX ORDER — AMLODIPINE BESYLATE 5 MG/1
5 TABLET ORAL
Qty: 30 TABLET | Refills: 0 | Status: SHIPPED | OUTPATIENT
Start: 2020-04-28 | End: 2020-05-28 | Stop reason: HOSPADM

## 2020-04-28 NOTE — TELEPHONE ENCOUNTER
PT CALLED TO REQUEST A REFILL FOR amLODIPine (NORVASC) 5 MG tablet.    PT CONTACT [phone]     PHARMACY VERIFIED NIKITA

## 2020-04-28 NOTE — OUTREACH NOTE
Medical Week 3 Survey      Responses   [facility] [place] patient discharged from?  [place]   COVID-19 Test Status  Not tested   Does the patient have one of the following disease processes/diagnoses(primary or secondary)?  Other   Week 3 attempt successful?  Yes   Call start time  1609   Call end time  1612   Discharge diagnosis  Acute urinary retention Bilateral hydronephrosis   RAFAT   Meds reviewed with patient/caregiver?  Yes   Is the patient taking all medications as directed (includes completed medication regime)?  Yes   Comments regarding PCP  Pt spoke with PCP on the phone   Has the patient kept scheduled appointments due by today?  Yes   What is the Home health agency?   [facility]    What DME was ordered?  [employer] [facility]   SELF CATH ARTICLES   DME comments  Pt has not received any supplies. I provided her with the number to [employer] and advised her to call them at [phone]   What is the patient's perception of their health status since discharge?  Improving   Week 3 Call Completed?  Yes          Kerri Griffith RN

## 2020-04-29 ENCOUNTER — TELEPHONE (OUTPATIENT)
Dept: INTERNAL MEDICINE | Facility: CLINIC | Age: 60
End: 2020-04-29

## 2020-05-06 ENCOUNTER — TELEPHONE (OUTPATIENT)
Dept: INTERNAL MEDICINE | Facility: CLINIC | Age: 60
End: 2020-05-06

## 2020-05-06 NOTE — TELEPHONE ENCOUNTER
I'm worried about patient's blood sugars and compliance with and understanding of her medications. Please contact her and ask if she will schedule a telephone follow up with me to discuss her blood sugars this week. Thanks.

## 2020-05-07 ENCOUNTER — READMISSION MANAGEMENT (OUTPATIENT)
Dept: CALL CENTER | Facility: HOSPITAL | Age: 60
End: 2020-05-07

## 2020-05-07 NOTE — OUTREACH NOTE
Medical Week 4 Survey      Responses   [facility] patient discharged from?  [place]   COVID-19 Test Status  Not tested   Does the patient have one of the following disease processes/diagnoses(primary or secondary)?  Other   Week 4 attempt successful?  Yes   Call start time  1536   Call end time  1538   Discharge diagnosis  Acute urinary retention Bilateral hydronephrosis   RAFAT   Meds reviewed with patient/caregiver?  Yes   Is the patient having any side effects they believe may be caused by any medication additions or changes?  No   Is the patient taking all medications as directed (includes completed medication regime)?  Yes   Has the patient kept scheduled appointments due by today?  Yes   Comments  I/O Cath   Is the patient still receiving Home Health Services?  N/A   Psychosocial issues?  No   What is the patient's perception of their health status since discharge?  Improving   Is the patient/caregiver able to teach back signs and symptoms related to disease process for when to call PCP?  Yes   Is the patient/caregiver able to teach back signs and symptoms related to disease process for when to call 911?  Yes   Is the patient/caregiver able to teach back the hierarchy of who to call/visit for symptoms/problems? PCP, Specialist, Home health nurse, Urgent Care, ED, 911  Yes   Additional teach back comments  She is doing much better, she appreciates our care and concern.   Week 4 Call Completed?  Yes   Would the patient like one additional call?  No   Graduated  Yes   Did the patient feel the follow up calls were helpful during their recovery period?  Yes   Was the number of calls appropriate?  Yes          Alejandra Pugh RN

## 2020-05-24 ENCOUNTER — APPOINTMENT (OUTPATIENT)
Dept: GENERAL RADIOLOGY | Facility: HOSPITAL | Age: 60
End: 2020-05-24

## 2020-05-24 ENCOUNTER — APPOINTMENT (OUTPATIENT)
Dept: CT IMAGING | Facility: HOSPITAL | Age: 60
End: 2020-05-24

## 2020-05-24 ENCOUNTER — HOSPITAL ENCOUNTER (INPATIENT)
Facility: HOSPITAL | Age: 60
LOS: 3 days | Discharge: HOME | End: 2020-05-28
Attending: EMERGENCY MEDICINE | Admitting: INTERNAL MEDICINE

## 2020-05-24 DIAGNOSIS — E87.6 HYPOKALEMIA: ICD-10-CM

## 2020-05-24 DIAGNOSIS — R31.9 URINARY TRACT INFECTION WITH HEMATURIA, SITE UNSPECIFIED: ICD-10-CM

## 2020-05-24 DIAGNOSIS — R11.2 NON-INTRACTABLE VOMITING WITH NAUSEA, UNSPECIFIED VOMITING TYPE: ICD-10-CM

## 2020-05-24 DIAGNOSIS — R10.84 GENERALIZED ABDOMINAL PAIN: Primary | ICD-10-CM

## 2020-05-24 DIAGNOSIS — N39.0 URINARY TRACT INFECTION WITH HEMATURIA, SITE UNSPECIFIED: ICD-10-CM

## 2020-05-24 DIAGNOSIS — I10 HYPERTENSION, UNSPECIFIED TYPE: ICD-10-CM

## 2020-05-24 DIAGNOSIS — Z74.09 IMPAIRED FUNCTIONAL MOBILITY, BALANCE, GAIT, AND ENDURANCE: ICD-10-CM

## 2020-05-24 LAB
ALBUMIN SERPL-MCNC: 4.6 G/DL (ref 3.5–5.2)
ALBUMIN/GLOB SERPL: 1.2 G/DL
ALP SERPL-CCNC: 115 U/L (ref 39–117)
ALT SERPL W P-5'-P-CCNC: 11 U/L (ref 1–33)
ANION GAP SERPL CALCULATED.3IONS-SCNC: 19 MMOL/L (ref 5–15)
AST SERPL-CCNC: 16 U/L (ref 1–32)
BASOPHILS # BLD AUTO: 0.03 10*3/MM3 (ref 0–0.2)
BASOPHILS NFR BLD AUTO: 0.2 % (ref 0–1.5)
BILIRUB SERPL-MCNC: 0.4 MG/DL (ref 0.2–1.2)
BUN BLD-MCNC: 14 MG/DL (ref 8–23)
BUN/CREAT SERPL: 17.1 (ref 7–25)
CALCIUM SPEC-SCNC: 10 MG/DL (ref 8.6–10.5)
CHLORIDE SERPL-SCNC: 96 MMOL/L (ref 98–107)
CO2 SERPL-SCNC: 28 MMOL/L (ref 22–29)
CREAT BLD-MCNC: 0.82 MG/DL (ref 0.57–1)
D-LACTATE SERPL-SCNC: 2.1 MMOL/L (ref 0.5–2)
DEPRECATED RDW RBC AUTO: 41.3 FL (ref 37–54)
EOSINOPHIL # BLD AUTO: 0.02 10*3/MM3 (ref 0–0.4)
EOSINOPHIL NFR BLD AUTO: 0.2 % (ref 0.3–6.2)
ERYTHROCYTE [DISTWIDTH] IN BLOOD BY AUTOMATED COUNT: 12.5 % (ref 12.3–15.4)
GFR SERPL CREATININE-BSD FRML MDRD: 86 ML/MIN/1.73
GLOBULIN UR ELPH-MCNC: 3.9 GM/DL
GLUCOSE BLD-MCNC: 318 MG/DL (ref 65–99)
HCT VFR BLD AUTO: 39.5 % (ref 34–46.6)
HGB BLD-MCNC: 13.1 G/DL (ref 12–15.9)
HOLD SPECIMEN: NORMAL
HOLD SPECIMEN: NORMAL
IMM GRANULOCYTES # BLD AUTO: 0.1 10*3/MM3 (ref 0–0.05)
IMM GRANULOCYTES NFR BLD AUTO: 0.8 % (ref 0–0.5)
LIPASE SERPL-CCNC: 22 U/L (ref 13–60)
LYMPHOCYTES # BLD AUTO: 1.3 10*3/MM3 (ref 0.7–3.1)
LYMPHOCYTES NFR BLD AUTO: 10.3 % (ref 19.6–45.3)
MCH RBC QN AUTO: 30.3 PG (ref 26.6–33)
MCHC RBC AUTO-ENTMCNC: 33.2 G/DL (ref 31.5–35.7)
MCV RBC AUTO: 91.4 FL (ref 79–97)
MONOCYTES # BLD AUTO: 0.35 10*3/MM3 (ref 0.1–0.9)
MONOCYTES NFR BLD AUTO: 2.8 % (ref 5–12)
NEUTROPHILS # BLD AUTO: 10.83 10*3/MM3 (ref 1.7–7)
NEUTROPHILS NFR BLD AUTO: 85.7 % (ref 42.7–76)
NRBC BLD AUTO-RTO: 0 /100 WBC (ref 0–0.2)
PLATELET # BLD AUTO: 529 10*3/MM3 (ref 140–450)
PMV BLD AUTO: 9.6 FL (ref 6–12)
POTASSIUM BLD-SCNC: 3.3 MMOL/L (ref 3.5–5.2)
PROT SERPL-MCNC: 8.5 G/DL (ref 6–8.5)
RBC # BLD AUTO: 4.32 10*6/MM3 (ref 3.77–5.28)
SODIUM BLD-SCNC: 143 MMOL/L (ref 136–145)
TROPONIN T SERPL-MCNC: <0.01 NG/ML (ref 0–0.03)
WBC NRBC COR # BLD: 12.63 10*3/MM3 (ref 3.4–10.8)
WHOLE BLOOD HOLD SPECIMEN: NORMAL
WHOLE BLOOD HOLD SPECIMEN: NORMAL

## 2020-05-24 PROCEDURE — 85025 COMPLETE CBC W/AUTO DIFF WBC: CPT | Performed by: EMERGENCY MEDICINE

## 2020-05-24 PROCEDURE — 83605 ASSAY OF LACTIC ACID: CPT | Performed by: EMERGENCY MEDICINE

## 2020-05-24 PROCEDURE — 74176 CT ABD & PELVIS W/O CONTRAST: CPT

## 2020-05-24 PROCEDURE — 83690 ASSAY OF LIPASE: CPT | Performed by: EMERGENCY MEDICINE

## 2020-05-24 PROCEDURE — 87186 SC STD MICRODIL/AGAR DIL: CPT | Performed by: INTERNAL MEDICINE

## 2020-05-24 PROCEDURE — 81001 URINALYSIS AUTO W/SCOPE: CPT | Performed by: EMERGENCY MEDICINE

## 2020-05-24 PROCEDURE — 93005 ELECTROCARDIOGRAM TRACING: CPT | Performed by: EMERGENCY MEDICINE

## 2020-05-24 PROCEDURE — 99285 EMERGENCY DEPT VISIT HI MDM: CPT

## 2020-05-24 PROCEDURE — 84484 ASSAY OF TROPONIN QUANT: CPT | Performed by: EMERGENCY MEDICINE

## 2020-05-24 PROCEDURE — 87086 URINE CULTURE/COLONY COUNT: CPT | Performed by: INTERNAL MEDICINE

## 2020-05-24 PROCEDURE — 25010000002 ONDANSETRON PER 1 MG: Performed by: EMERGENCY MEDICINE

## 2020-05-24 PROCEDURE — 87088 URINE BACTERIA CULTURE: CPT | Performed by: INTERNAL MEDICINE

## 2020-05-24 PROCEDURE — 71045 X-RAY EXAM CHEST 1 VIEW: CPT

## 2020-05-24 PROCEDURE — 80053 COMPREHEN METABOLIC PANEL: CPT | Performed by: EMERGENCY MEDICINE

## 2020-05-24 RX ORDER — SODIUM CHLORIDE 0.9 % (FLUSH) 0.9 %
10 SYRINGE (ML) INJECTION AS NEEDED
Status: DISCONTINUED | OUTPATIENT
Start: 2020-05-24 | End: 2020-05-28 | Stop reason: HOSPADM

## 2020-05-24 RX ORDER — ONDANSETRON 2 MG/ML
4 INJECTION INTRAMUSCULAR; INTRAVENOUS
Status: DISCONTINUED | OUTPATIENT
Start: 2020-05-24 | End: 2020-05-28 | Stop reason: HOSPADM

## 2020-05-24 RX ADMIN — ONDANSETRON 4 MG: 2 INJECTION INTRAMUSCULAR; INTRAVENOUS at 22:22

## 2020-05-24 RX ADMIN — SODIUM CHLORIDE 500 ML: 9 INJECTION, SOLUTION INTRAVENOUS at 22:22

## 2020-05-25 PROBLEM — N39.0 SEPSIS SECONDARY TO UTI (HCC): Status: ACTIVE | Noted: 2020-05-25

## 2020-05-25 PROBLEM — A41.9 SEPSIS SECONDARY TO UTI: Status: ACTIVE | Noted: 2020-05-25

## 2020-05-25 LAB
ANION GAP SERPL CALCULATED.3IONS-SCNC: 19 MMOL/L (ref 5–15)
BACTERIA UR QL AUTO: ABNORMAL /HPF
BASOPHILS # BLD AUTO: 0.02 10*3/MM3 (ref 0–0.2)
BASOPHILS NFR BLD AUTO: 0.1 % (ref 0–1.5)
BILIRUB UR QL STRIP: NEGATIVE
BUN BLD-MCNC: 14 MG/DL (ref 8–23)
BUN/CREAT SERPL: 17.7 (ref 7–25)
CALCIUM SPEC-SCNC: 9.4 MG/DL (ref 8.6–10.5)
CHLORIDE SERPL-SCNC: 96 MMOL/L (ref 98–107)
CLARITY UR: ABNORMAL
CO2 SERPL-SCNC: 26 MMOL/L (ref 22–29)
COLOR UR: YELLOW
CREAT BLD-MCNC: 0.79 MG/DL (ref 0.57–1)
D-LACTATE SERPL-SCNC: 1.5 MMOL/L (ref 0.5–2)
DEPRECATED RDW RBC AUTO: 40.4 FL (ref 37–54)
EOSINOPHIL # BLD AUTO: 0 10*3/MM3 (ref 0–0.4)
EOSINOPHIL NFR BLD AUTO: 0 % (ref 0.3–6.2)
ERYTHROCYTE [DISTWIDTH] IN BLOOD BY AUTOMATED COUNT: 12.4 % (ref 12.3–15.4)
GFR SERPL CREATININE-BSD FRML MDRD: 90 ML/MIN/1.73
GLUCOSE BLD-MCNC: 412 MG/DL (ref 65–99)
GLUCOSE BLDC GLUCOMTR-MCNC: 285 MG/DL (ref 70–130)
GLUCOSE BLDC GLUCOMTR-MCNC: 304 MG/DL (ref 70–130)
GLUCOSE BLDC GLUCOMTR-MCNC: 318 MG/DL (ref 70–130)
GLUCOSE BLDC GLUCOMTR-MCNC: 319 MG/DL (ref 70–130)
GLUCOSE BLDC GLUCOMTR-MCNC: 359 MG/DL (ref 70–130)
GLUCOSE BLDC GLUCOMTR-MCNC: 381 MG/DL (ref 70–130)
GLUCOSE UR STRIP-MCNC: ABNORMAL MG/DL
HCT VFR BLD AUTO: 38.3 % (ref 34–46.6)
HGB BLD-MCNC: 12.9 G/DL (ref 12–15.9)
HGB UR QL STRIP.AUTO: ABNORMAL
HYALINE CASTS UR QL AUTO: ABNORMAL /LPF
IMM GRANULOCYTES # BLD AUTO: 0.06 10*3/MM3 (ref 0–0.05)
IMM GRANULOCYTES NFR BLD AUTO: 0.4 % (ref 0–0.5)
KETONES UR QL STRIP: ABNORMAL
LACTATE HOLD SPECIMEN: NORMAL
LEUKOCYTE ESTERASE UR QL STRIP.AUTO: ABNORMAL
LYMPHOCYTES # BLD AUTO: 0.74 10*3/MM3 (ref 0.7–3.1)
LYMPHOCYTES NFR BLD AUTO: 5 % (ref 19.6–45.3)
MCH RBC QN AUTO: 30.1 PG (ref 26.6–33)
MCHC RBC AUTO-ENTMCNC: 33.7 G/DL (ref 31.5–35.7)
MCV RBC AUTO: 89.3 FL (ref 79–97)
MONOCYTES # BLD AUTO: 0.11 10*3/MM3 (ref 0.1–0.9)
MONOCYTES NFR BLD AUTO: 0.7 % (ref 5–12)
NEUTROPHILS # BLD AUTO: 13.78 10*3/MM3 (ref 1.7–7)
NEUTROPHILS NFR BLD AUTO: 93.8 % (ref 42.7–76)
NITRITE UR QL STRIP: NEGATIVE
NRBC BLD AUTO-RTO: 0 /100 WBC (ref 0–0.2)
PH UR STRIP.AUTO: 8.5 [PH] (ref 5–8)
PLATELET # BLD AUTO: 519 10*3/MM3 (ref 140–450)
PMV BLD AUTO: 9.7 FL (ref 6–12)
POTASSIUM BLD-SCNC: 3.5 MMOL/L (ref 3.5–5.2)
PROT UR QL STRIP: ABNORMAL
RBC # BLD AUTO: 4.29 10*6/MM3 (ref 3.77–5.28)
RBC # UR: ABNORMAL /HPF
REF LAB TEST METHOD: ABNORMAL
SODIUM BLD-SCNC: 141 MMOL/L (ref 136–145)
SP GR UR STRIP: 1.01 (ref 1–1.03)
SQUAMOUS #/AREA URNS HPF: ABNORMAL /HPF
UROBILINOGEN UR QL STRIP: ABNORMAL
WBC NRBC COR # BLD: 14.71 10*3/MM3 (ref 3.4–10.8)
WBC UR QL AUTO: ABNORMAL /HPF

## 2020-05-25 PROCEDURE — 63710000001 PROMETHAZINE PER 12.5 MG: Performed by: INTERNAL MEDICINE

## 2020-05-25 PROCEDURE — 25010000002 ONDANSETRON PER 1 MG: Performed by: INTERNAL MEDICINE

## 2020-05-25 PROCEDURE — 83605 ASSAY OF LACTIC ACID: CPT | Performed by: INTERNAL MEDICINE

## 2020-05-25 PROCEDURE — 94799 UNLISTED PULMONARY SVC/PX: CPT

## 2020-05-25 PROCEDURE — 97165 OT EVAL LOW COMPLEX 30 MIN: CPT

## 2020-05-25 PROCEDURE — 85025 COMPLETE CBC W/AUTO DIFF WBC: CPT | Performed by: INTERNAL MEDICINE

## 2020-05-25 PROCEDURE — 80048 BASIC METABOLIC PNL TOTAL CA: CPT | Performed by: INTERNAL MEDICINE

## 2020-05-25 PROCEDURE — 25010000002 CEFTRIAXONE PER 250 MG: Performed by: EMERGENCY MEDICINE

## 2020-05-25 PROCEDURE — 25010000002 CEFTRIAXONE PER 250 MG: Performed by: INTERNAL MEDICINE

## 2020-05-25 PROCEDURE — 25010000002 PROMETHAZINE PER 50 MG: Performed by: INTERNAL MEDICINE

## 2020-05-25 PROCEDURE — 97161 PT EVAL LOW COMPLEX 20 MIN: CPT

## 2020-05-25 PROCEDURE — 94640 AIRWAY INHALATION TREATMENT: CPT

## 2020-05-25 PROCEDURE — 82962 GLUCOSE BLOOD TEST: CPT

## 2020-05-25 PROCEDURE — 87040 BLOOD CULTURE FOR BACTERIA: CPT | Performed by: EMERGENCY MEDICINE

## 2020-05-25 PROCEDURE — 99223 1ST HOSP IP/OBS HIGH 75: CPT | Performed by: INTERNAL MEDICINE

## 2020-05-25 PROCEDURE — 25010000002 ENOXAPARIN PER 10 MG: Performed by: INTERNAL MEDICINE

## 2020-05-25 RX ORDER — PANTOPRAZOLE SODIUM 40 MG/1
40 TABLET, DELAYED RELEASE ORAL
Status: DISCONTINUED | OUTPATIENT
Start: 2020-05-25 | End: 2020-05-28 | Stop reason: HOSPADM

## 2020-05-25 RX ORDER — SODIUM CHLORIDE, SODIUM LACTATE, POTASSIUM CHLORIDE, CALCIUM CHLORIDE 600; 310; 30; 20 MG/100ML; MG/100ML; MG/100ML; MG/100ML
100 INJECTION, SOLUTION INTRAVENOUS CONTINUOUS
Status: DISCONTINUED | OUTPATIENT
Start: 2020-05-25 | End: 2020-05-27

## 2020-05-25 RX ORDER — POTASSIUM CHLORIDE 750 MG/1
40 CAPSULE, EXTENDED RELEASE ORAL AS NEEDED
Status: DISCONTINUED | OUTPATIENT
Start: 2020-05-25 | End: 2020-05-28 | Stop reason: HOSPADM

## 2020-05-25 RX ORDER — ONDANSETRON 4 MG/1
4 TABLET, FILM COATED ORAL EVERY 6 HOURS PRN
Status: DISCONTINUED | OUTPATIENT
Start: 2020-05-25 | End: 2020-05-28 | Stop reason: HOSPADM

## 2020-05-25 RX ORDER — MAGNESIUM SULFATE HEPTAHYDRATE 40 MG/ML
2 INJECTION, SOLUTION INTRAVENOUS AS NEEDED
Status: DISCONTINUED | OUTPATIENT
Start: 2020-05-25 | End: 2020-05-28 | Stop reason: HOSPADM

## 2020-05-25 RX ORDER — ACETAMINOPHEN 160 MG/5ML
650 SOLUTION ORAL EVERY 4 HOURS PRN
Status: DISCONTINUED | OUTPATIENT
Start: 2020-05-25 | End: 2020-05-28 | Stop reason: HOSPADM

## 2020-05-25 RX ORDER — TRAMADOL HYDROCHLORIDE 50 MG/1
50 TABLET ORAL 2 TIMES DAILY PRN
Status: DISCONTINUED | OUTPATIENT
Start: 2020-05-25 | End: 2020-05-28 | Stop reason: HOSPADM

## 2020-05-25 RX ORDER — HYDROCHLOROTHIAZIDE 12.5 MG/1
12.5 CAPSULE, GELATIN COATED ORAL DAILY
Status: DISCONTINUED | OUTPATIENT
Start: 2020-05-25 | End: 2020-05-25

## 2020-05-25 RX ORDER — TERAZOSIN 1 MG/1
1 CAPSULE ORAL NIGHTLY
Status: DISCONTINUED | OUTPATIENT
Start: 2020-05-25 | End: 2020-05-28 | Stop reason: HOSPADM

## 2020-05-25 RX ORDER — POTASSIUM CHLORIDE 1.5 G/1.77G
40 POWDER, FOR SOLUTION ORAL AS NEEDED
Status: DISCONTINUED | OUTPATIENT
Start: 2020-05-25 | End: 2020-05-28 | Stop reason: HOSPADM

## 2020-05-25 RX ORDER — SODIUM CHLORIDE 0.9 % (FLUSH) 0.9 %
10 SYRINGE (ML) INJECTION AS NEEDED
Status: DISCONTINUED | OUTPATIENT
Start: 2020-05-25 | End: 2020-05-28 | Stop reason: HOSPADM

## 2020-05-25 RX ORDER — POTASSIUM CHLORIDE 7.45 MG/ML
10 INJECTION INTRAVENOUS
Status: DISCONTINUED | OUTPATIENT
Start: 2020-05-25 | End: 2020-05-28 | Stop reason: HOSPADM

## 2020-05-25 RX ORDER — DEXTROSE MONOHYDRATE 25 G/50ML
25 INJECTION, SOLUTION INTRAVENOUS
Status: DISCONTINUED | OUTPATIENT
Start: 2020-05-25 | End: 2020-05-28 | Stop reason: HOSPADM

## 2020-05-25 RX ORDER — ONDANSETRON 2 MG/ML
4 INJECTION INTRAMUSCULAR; INTRAVENOUS EVERY 6 HOURS PRN
Status: DISCONTINUED | OUTPATIENT
Start: 2020-05-25 | End: 2020-05-28 | Stop reason: HOSPADM

## 2020-05-25 RX ORDER — MAGNESIUM SULFATE HEPTAHYDRATE 40 MG/ML
4 INJECTION, SOLUTION INTRAVENOUS AS NEEDED
Status: DISCONTINUED | OUTPATIENT
Start: 2020-05-25 | End: 2020-05-28 | Stop reason: HOSPADM

## 2020-05-25 RX ORDER — AMOXICILLIN 250 MG
2 CAPSULE ORAL NIGHTLY
Status: DISCONTINUED | OUTPATIENT
Start: 2020-05-25 | End: 2020-05-28 | Stop reason: HOSPADM

## 2020-05-25 RX ORDER — ACETAMINOPHEN 650 MG/1
650 SUPPOSITORY RECTAL EVERY 4 HOURS PRN
Status: DISCONTINUED | OUTPATIENT
Start: 2020-05-25 | End: 2020-05-28 | Stop reason: HOSPADM

## 2020-05-25 RX ORDER — AMLODIPINE BESYLATE 5 MG/1
5 TABLET ORAL
Status: DISCONTINUED | OUTPATIENT
Start: 2020-05-25 | End: 2020-05-25

## 2020-05-25 RX ORDER — GABAPENTIN 400 MG/1
400 CAPSULE ORAL 3 TIMES DAILY
Status: DISCONTINUED | OUTPATIENT
Start: 2020-05-25 | End: 2020-05-28 | Stop reason: HOSPADM

## 2020-05-25 RX ORDER — METOPROLOL TARTRATE 5 MG/5ML
5 INJECTION INTRAVENOUS ONCE
Status: COMPLETED | OUTPATIENT
Start: 2020-05-25 | End: 2020-05-25

## 2020-05-25 RX ORDER — LISINOPRIL 40 MG/1
40 TABLET ORAL DAILY
Status: DISCONTINUED | OUTPATIENT
Start: 2020-05-25 | End: 2020-05-28 | Stop reason: HOSPADM

## 2020-05-25 RX ORDER — DICYCLOMINE HCL 20 MG
20 TABLET ORAL EVERY 6 HOURS PRN
Status: DISCONTINUED | OUTPATIENT
Start: 2020-05-25 | End: 2020-05-28 | Stop reason: HOSPADM

## 2020-05-25 RX ORDER — SODIUM CHLORIDE 0.9 % (FLUSH) 0.9 %
10 SYRINGE (ML) INJECTION EVERY 12 HOURS SCHEDULED
Status: DISCONTINUED | OUTPATIENT
Start: 2020-05-25 | End: 2020-05-28 | Stop reason: HOSPADM

## 2020-05-25 RX ORDER — IPRATROPIUM BROMIDE AND ALBUTEROL SULFATE 2.5; .5 MG/3ML; MG/3ML
3 SOLUTION RESPIRATORY (INHALATION)
Status: DISCONTINUED | OUTPATIENT
Start: 2020-05-25 | End: 2020-05-28 | Stop reason: HOSPADM

## 2020-05-25 RX ORDER — ACETAMINOPHEN 325 MG/1
650 TABLET ORAL EVERY 4 HOURS PRN
Status: DISCONTINUED | OUTPATIENT
Start: 2020-05-25 | End: 2020-05-28 | Stop reason: HOSPADM

## 2020-05-25 RX ORDER — AMLODIPINE BESYLATE 10 MG/1
10 TABLET ORAL
Status: DISCONTINUED | OUTPATIENT
Start: 2020-05-26 | End: 2020-05-28 | Stop reason: HOSPADM

## 2020-05-25 RX ORDER — PROMETHAZINE HYDROCHLORIDE 12.5 MG/1
12.5 TABLET ORAL EVERY 6 HOURS PRN
Status: DISCONTINUED | OUTPATIENT
Start: 2020-05-25 | End: 2020-05-28 | Stop reason: HOSPADM

## 2020-05-25 RX ORDER — ASPIRIN 81 MG/1
81 TABLET ORAL DAILY
Status: DISCONTINUED | OUTPATIENT
Start: 2020-05-25 | End: 2020-05-28 | Stop reason: HOSPADM

## 2020-05-25 RX ORDER — PROMETHAZINE HYDROCHLORIDE 25 MG/ML
12.5 INJECTION, SOLUTION INTRAMUSCULAR; INTRAVENOUS EVERY 6 HOURS PRN
Status: DISCONTINUED | OUTPATIENT
Start: 2020-05-25 | End: 2020-05-28 | Stop reason: HOSPADM

## 2020-05-25 RX ORDER — ATORVASTATIN CALCIUM 40 MG/1
80 TABLET, FILM COATED ORAL NIGHTLY
Status: DISCONTINUED | OUTPATIENT
Start: 2020-05-25 | End: 2020-05-28 | Stop reason: HOSPADM

## 2020-05-25 RX ORDER — LISINOPRIL 40 MG/1
40 TABLET ORAL DAILY
Status: DISCONTINUED | OUTPATIENT
Start: 2020-05-25 | End: 2020-05-25

## 2020-05-25 RX ORDER — LABETALOL HYDROCHLORIDE 5 MG/ML
20 INJECTION, SOLUTION INTRAVENOUS EVERY 6 HOURS PRN
Status: DISCONTINUED | OUTPATIENT
Start: 2020-05-25 | End: 2020-05-28 | Stop reason: HOSPADM

## 2020-05-25 RX ORDER — NICOTINE POLACRILEX 4 MG
15 LOZENGE BUCCAL
Status: DISCONTINUED | OUTPATIENT
Start: 2020-05-25 | End: 2020-05-28 | Stop reason: HOSPADM

## 2020-05-25 RX ADMIN — ASPIRIN 81 MG: 81 TABLET, COATED ORAL at 07:45

## 2020-05-25 RX ADMIN — IPRATROPIUM BROMIDE AND ALBUTEROL SULFATE 3 ML: 2.5; .5 SOLUTION RESPIRATORY (INHALATION) at 16:11

## 2020-05-25 RX ADMIN — GABAPENTIN 400 MG: 400 CAPSULE ORAL at 06:16

## 2020-05-25 RX ADMIN — IPRATROPIUM BROMIDE AND ALBUTEROL SULFATE 3 ML: 2.5; .5 SOLUTION RESPIRATORY (INHALATION) at 12:19

## 2020-05-25 RX ADMIN — NICARDIPINE HYDROCHLORIDE 5 MG/HR: 0.1 INJECTION, SOLUTION INTRAVENOUS at 07:09

## 2020-05-25 RX ADMIN — PROMETHAZINE HYDROCHLORIDE 12.5 MG: 12.5 TABLET ORAL at 20:18

## 2020-05-25 RX ADMIN — LABETALOL 20 MG/4 ML (5 MG/ML) INTRAVENOUS SYRINGE 20 MG: at 03:44

## 2020-05-25 RX ADMIN — ONDANSETRON 4 MG: 2 INJECTION INTRAMUSCULAR; INTRAVENOUS at 06:29

## 2020-05-25 RX ADMIN — ENOXAPARIN SODIUM 40 MG: 40 INJECTION SUBCUTANEOUS at 07:43

## 2020-05-25 RX ADMIN — IPRATROPIUM BROMIDE AND ALBUTEROL SULFATE 3 ML: 2.5; .5 SOLUTION RESPIRATORY (INHALATION) at 08:38

## 2020-05-25 RX ADMIN — AMLODIPINE BESYLATE 5 MG: 5 TABLET ORAL at 04:42

## 2020-05-25 RX ADMIN — CEFTRIAXONE 1 G: 1 INJECTION, POWDER, FOR SOLUTION INTRAMUSCULAR; INTRAVENOUS at 20:20

## 2020-05-25 RX ADMIN — PANTOPRAZOLE SODIUM 40 MG: 40 TABLET, DELAYED RELEASE ORAL at 06:16

## 2020-05-25 RX ADMIN — PROMETHAZINE HYDROCHLORIDE 12.5 MG: 25 INJECTION INTRAMUSCULAR; INTRAVENOUS at 12:05

## 2020-05-25 RX ADMIN — SODIUM CHLORIDE, POTASSIUM CHLORIDE, SODIUM LACTATE AND CALCIUM CHLORIDE 100 ML/HR: 600; 310; 30; 20 INJECTION, SOLUTION INTRAVENOUS at 03:44

## 2020-05-25 RX ADMIN — METOPROLOL TARTRATE 5 MG: 5 INJECTION INTRAVENOUS at 00:31

## 2020-05-25 RX ADMIN — LISINOPRIL 40 MG: 40 TABLET ORAL at 04:42

## 2020-05-25 RX ADMIN — GABAPENTIN 400 MG: 400 CAPSULE ORAL at 13:00

## 2020-05-25 RX ADMIN — SENNOSIDES AND DOCUSATE SODIUM 2 TABLET: 8.6; 5 TABLET ORAL at 20:17

## 2020-05-25 RX ADMIN — CEFTRIAXONE 1 G: 1 INJECTION, POWDER, FOR SOLUTION INTRAMUSCULAR; INTRAVENOUS at 00:40

## 2020-05-25 RX ADMIN — POTASSIUM CHLORIDE 40 MEQ: 10 CAPSULE, COATED, EXTENDED RELEASE ORAL at 20:19

## 2020-05-25 RX ADMIN — ATORVASTATIN CALCIUM 80 MG: 40 TABLET, FILM COATED ORAL at 20:17

## 2020-05-25 RX ADMIN — GABAPENTIN 400 MG: 400 CAPSULE ORAL at 23:14

## 2020-05-25 RX ADMIN — POTASSIUM CHLORIDE 40 MEQ: 10 CAPSULE, COATED, EXTENDED RELEASE ORAL at 11:32

## 2020-05-25 RX ADMIN — IPRATROPIUM BROMIDE AND ALBUTEROL SULFATE 3 ML: 2.5; .5 SOLUTION RESPIRATORY (INHALATION) at 20:34

## 2020-05-25 RX ADMIN — TERAZOSIN HYDROCHLORIDE 1 MG: 1 CAPSULE ORAL at 20:17

## 2020-05-25 RX ADMIN — HYDROCHLOROTHIAZIDE 12.5 MG: 12.5 CAPSULE ORAL at 07:44

## 2020-05-26 LAB
ANION GAP SERPL CALCULATED.3IONS-SCNC: 13 MMOL/L (ref 5–15)
BUN BLD-MCNC: 28 MG/DL (ref 8–23)
BUN/CREAT SERPL: 25.9 (ref 7–25)
CALCIUM SPEC-SCNC: 9.3 MG/DL (ref 8.6–10.5)
CHLORIDE SERPL-SCNC: 102 MMOL/L (ref 98–107)
CO2 SERPL-SCNC: 27 MMOL/L (ref 22–29)
CREAT BLD-MCNC: 1.08 MG/DL (ref 0.57–1)
DEPRECATED RDW RBC AUTO: 45.4 FL (ref 37–54)
ERYTHROCYTE [DISTWIDTH] IN BLOOD BY AUTOMATED COUNT: 13.2 % (ref 12.3–15.4)
GFR SERPL CREATININE-BSD FRML MDRD: 63 ML/MIN/1.73
GLUCOSE BLD-MCNC: 211 MG/DL (ref 65–99)
GLUCOSE BLDC GLUCOMTR-MCNC: 121 MG/DL (ref 70–130)
GLUCOSE BLDC GLUCOMTR-MCNC: 161 MG/DL (ref 70–130)
GLUCOSE BLDC GLUCOMTR-MCNC: 200 MG/DL (ref 70–130)
GLUCOSE BLDC GLUCOMTR-MCNC: 223 MG/DL (ref 70–130)
HCT VFR BLD AUTO: 34.6 % (ref 34–46.6)
HGB BLD-MCNC: 11.1 G/DL (ref 12–15.9)
MCH RBC QN AUTO: 30 PG (ref 26.6–33)
MCHC RBC AUTO-ENTMCNC: 32.1 G/DL (ref 31.5–35.7)
MCV RBC AUTO: 93.5 FL (ref 79–97)
PLATELET # BLD AUTO: 417 10*3/MM3 (ref 140–450)
PMV BLD AUTO: 10 FL (ref 6–12)
POTASSIUM BLD-SCNC: 4.5 MMOL/L (ref 3.5–5.2)
POTASSIUM BLD-SCNC: 4.6 MMOL/L (ref 3.5–5.2)
RBC # BLD AUTO: 3.7 10*6/MM3 (ref 3.77–5.28)
SODIUM BLD-SCNC: 142 MMOL/L (ref 136–145)
WBC NRBC COR # BLD: 10.39 10*3/MM3 (ref 3.4–10.8)

## 2020-05-26 PROCEDURE — 85027 COMPLETE CBC AUTOMATED: CPT | Performed by: INTERNAL MEDICINE

## 2020-05-26 PROCEDURE — 25010000002 ENOXAPARIN PER 10 MG: Performed by: INTERNAL MEDICINE

## 2020-05-26 PROCEDURE — 25010000002 ONDANSETRON PER 1 MG: Performed by: INTERNAL MEDICINE

## 2020-05-26 PROCEDURE — 84132 ASSAY OF SERUM POTASSIUM: CPT | Performed by: INTERNAL MEDICINE

## 2020-05-26 PROCEDURE — 25010000002 CEFTRIAXONE PER 250 MG: Performed by: INTERNAL MEDICINE

## 2020-05-26 PROCEDURE — 94799 UNLISTED PULMONARY SVC/PX: CPT

## 2020-05-26 PROCEDURE — 97116 GAIT TRAINING THERAPY: CPT

## 2020-05-26 PROCEDURE — 80048 BASIC METABOLIC PNL TOTAL CA: CPT | Performed by: INTERNAL MEDICINE

## 2020-05-26 PROCEDURE — 99233 SBSQ HOSP IP/OBS HIGH 50: CPT | Performed by: INTERNAL MEDICINE

## 2020-05-26 PROCEDURE — 82962 GLUCOSE BLOOD TEST: CPT

## 2020-05-26 PROCEDURE — 63710000001 ONDANSETRON PER 8 MG: Performed by: INTERNAL MEDICINE

## 2020-05-26 RX ORDER — NITROFURANTOIN 25; 75 MG/1; MG/1
100 CAPSULE ORAL 2 TIMES DAILY
Qty: 12 CAPSULE | Refills: 0 | Status: SHIPPED | OUTPATIENT
Start: 2020-05-26 | End: 2020-06-01

## 2020-05-26 RX ORDER — AMLODIPINE BESYLATE 10 MG/1
10 TABLET ORAL
Qty: 30 TABLET | Refills: 0 | Status: SHIPPED | OUTPATIENT
Start: 2020-05-27 | End: 2020-05-29 | Stop reason: SDUPTHER

## 2020-05-26 RX ADMIN — PANTOPRAZOLE SODIUM 40 MG: 40 TABLET, DELAYED RELEASE ORAL at 05:06

## 2020-05-26 RX ADMIN — ONDANSETRON HYDROCHLORIDE 4 MG: 4 TABLET, FILM COATED ORAL at 23:48

## 2020-05-26 RX ADMIN — CEFTRIAXONE 1 G: 1 INJECTION, POWDER, FOR SOLUTION INTRAMUSCULAR; INTRAVENOUS at 22:00

## 2020-05-26 RX ADMIN — GABAPENTIN 400 MG: 400 CAPSULE ORAL at 05:06

## 2020-05-26 RX ADMIN — ASPIRIN 81 MG: 81 TABLET, COATED ORAL at 08:26

## 2020-05-26 RX ADMIN — ONDANSETRON 4 MG: 2 INJECTION INTRAMUSCULAR; INTRAVENOUS at 15:04

## 2020-05-26 RX ADMIN — TERAZOSIN HYDROCHLORIDE 1 MG: 1 CAPSULE ORAL at 22:09

## 2020-05-26 RX ADMIN — GABAPENTIN 400 MG: 400 CAPSULE ORAL at 13:52

## 2020-05-26 RX ADMIN — SODIUM CHLORIDE, POTASSIUM CHLORIDE, SODIUM LACTATE AND CALCIUM CHLORIDE 100 ML/HR: 600; 310; 30; 20 INJECTION, SOLUTION INTRAVENOUS at 05:01

## 2020-05-26 RX ADMIN — GABAPENTIN 400 MG: 400 CAPSULE ORAL at 22:09

## 2020-05-26 RX ADMIN — TRAMADOL HYDROCHLORIDE 50 MG: 50 TABLET, FILM COATED ORAL at 15:04

## 2020-05-26 RX ADMIN — ENOXAPARIN SODIUM 40 MG: 40 INJECTION SUBCUTANEOUS at 08:24

## 2020-05-26 RX ADMIN — IPRATROPIUM BROMIDE AND ALBUTEROL SULFATE 3 ML: 2.5; .5 SOLUTION RESPIRATORY (INHALATION) at 16:30

## 2020-05-26 RX ADMIN — ACETAMINOPHEN 650 MG: 325 TABLET, FILM COATED ORAL at 23:48

## 2020-05-26 RX ADMIN — SODIUM CHLORIDE, PRESERVATIVE FREE 10 ML: 5 INJECTION INTRAVENOUS at 08:26

## 2020-05-26 RX ADMIN — IPRATROPIUM BROMIDE AND ALBUTEROL SULFATE 3 ML: 2.5; .5 SOLUTION RESPIRATORY (INHALATION) at 20:17

## 2020-05-26 RX ADMIN — AMLODIPINE BESYLATE 10 MG: 10 TABLET ORAL at 08:24

## 2020-05-26 RX ADMIN — LISINOPRIL 40 MG: 40 TABLET ORAL at 08:26

## 2020-05-26 RX ADMIN — ATORVASTATIN CALCIUM 80 MG: 40 TABLET, FILM COATED ORAL at 22:08

## 2020-05-27 LAB
BACTERIA SPEC AEROBE CULT: ABNORMAL
GLUCOSE BLDC GLUCOMTR-MCNC: 160 MG/DL (ref 70–130)
GLUCOSE BLDC GLUCOMTR-MCNC: 228 MG/DL (ref 70–130)
GLUCOSE BLDC GLUCOMTR-MCNC: 258 MG/DL (ref 70–130)
GLUCOSE BLDC GLUCOMTR-MCNC: 274 MG/DL (ref 70–130)

## 2020-05-27 PROCEDURE — 99233 SBSQ HOSP IP/OBS HIGH 50: CPT | Performed by: INTERNAL MEDICINE

## 2020-05-27 PROCEDURE — 63710000001 ONDANSETRON PER 8 MG: Performed by: INTERNAL MEDICINE

## 2020-05-27 PROCEDURE — 94799 UNLISTED PULMONARY SVC/PX: CPT

## 2020-05-27 PROCEDURE — 25010000002 ENOXAPARIN PER 10 MG: Performed by: INTERNAL MEDICINE

## 2020-05-27 PROCEDURE — 82962 GLUCOSE BLOOD TEST: CPT

## 2020-05-27 RX ORDER — NITROFURANTOIN 25; 75 MG/1; MG/1
100 CAPSULE ORAL EVERY 12 HOURS SCHEDULED
Status: DISCONTINUED | OUTPATIENT
Start: 2020-05-27 | End: 2020-05-28 | Stop reason: HOSPADM

## 2020-05-27 RX ADMIN — SODIUM CHLORIDE, POTASSIUM CHLORIDE, SODIUM LACTATE AND CALCIUM CHLORIDE 100 ML/HR: 600; 310; 30; 20 INJECTION, SOLUTION INTRAVENOUS at 11:45

## 2020-05-27 RX ADMIN — ASPIRIN 81 MG: 81 TABLET, COATED ORAL at 08:29

## 2020-05-27 RX ADMIN — IPRATROPIUM BROMIDE AND ALBUTEROL SULFATE 3 ML: 2.5; .5 SOLUTION RESPIRATORY (INHALATION) at 07:31

## 2020-05-27 RX ADMIN — GABAPENTIN 400 MG: 400 CAPSULE ORAL at 05:17

## 2020-05-27 RX ADMIN — ACETAMINOPHEN 650 MG: 325 TABLET, FILM COATED ORAL at 05:16

## 2020-05-27 RX ADMIN — PANTOPRAZOLE SODIUM 40 MG: 40 TABLET, DELAYED RELEASE ORAL at 05:17

## 2020-05-27 RX ADMIN — NITROFURANTOIN (MONOHYDRATE/MACROCRYSTALS) 100 MG: 75; 25 CAPSULE ORAL at 14:44

## 2020-05-27 RX ADMIN — TERAZOSIN HYDROCHLORIDE 1 MG: 1 CAPSULE ORAL at 20:31

## 2020-05-27 RX ADMIN — GABAPENTIN 400 MG: 400 CAPSULE ORAL at 21:49

## 2020-05-27 RX ADMIN — SODIUM CHLORIDE, POTASSIUM CHLORIDE, SODIUM LACTATE AND CALCIUM CHLORIDE 100 ML/HR: 600; 310; 30; 20 INJECTION, SOLUTION INTRAVENOUS at 03:59

## 2020-05-27 RX ADMIN — LABETALOL 20 MG/4 ML (5 MG/ML) INTRAVENOUS SYRINGE 20 MG: at 17:24

## 2020-05-27 RX ADMIN — NITROFURANTOIN (MONOHYDRATE/MACROCRYSTALS) 100 MG: 75; 25 CAPSULE ORAL at 21:49

## 2020-05-27 RX ADMIN — LISINOPRIL 40 MG: 40 TABLET ORAL at 08:29

## 2020-05-27 RX ADMIN — SENNOSIDES AND DOCUSATE SODIUM 2 TABLET: 8.6; 5 TABLET ORAL at 20:31

## 2020-05-27 RX ADMIN — IPRATROPIUM BROMIDE AND ALBUTEROL SULFATE 3 ML: 2.5; .5 SOLUTION RESPIRATORY (INHALATION) at 15:30

## 2020-05-27 RX ADMIN — ENOXAPARIN SODIUM 40 MG: 40 INJECTION SUBCUTANEOUS at 08:30

## 2020-05-27 RX ADMIN — IPRATROPIUM BROMIDE AND ALBUTEROL SULFATE 3 ML: 2.5; .5 SOLUTION RESPIRATORY (INHALATION) at 20:48

## 2020-05-27 RX ADMIN — IPRATROPIUM BROMIDE AND ALBUTEROL SULFATE 3 ML: 2.5; .5 SOLUTION RESPIRATORY (INHALATION) at 12:58

## 2020-05-27 RX ADMIN — ONDANSETRON HYDROCHLORIDE 4 MG: 4 TABLET, FILM COATED ORAL at 10:44

## 2020-05-27 RX ADMIN — GABAPENTIN 400 MG: 400 CAPSULE ORAL at 14:44

## 2020-05-27 RX ADMIN — ATORVASTATIN CALCIUM 80 MG: 40 TABLET, FILM COATED ORAL at 20:31

## 2020-05-27 RX ADMIN — TRAMADOL HYDROCHLORIDE 50 MG: 50 TABLET, FILM COATED ORAL at 10:48

## 2020-05-27 RX ADMIN — AMLODIPINE BESYLATE 10 MG: 10 TABLET ORAL at 08:28

## 2020-05-28 ENCOUNTER — READMISSION MANAGEMENT (OUTPATIENT)
Dept: CALL CENTER | Facility: HOSPITAL | Age: 60
End: 2020-05-28

## 2020-05-28 VITALS
BODY MASS INDEX: 20 KG/M2 | TEMPERATURE: 98.3 F | OXYGEN SATURATION: 97 % | RESPIRATION RATE: 18 BRPM | DIASTOLIC BLOOD PRESSURE: 69 MMHG | SYSTOLIC BLOOD PRESSURE: 133 MMHG | HEIGHT: 68 IN | WEIGHT: 132 LBS | HEART RATE: 100 BPM

## 2020-05-28 LAB
ANION GAP SERPL CALCULATED.3IONS-SCNC: 15 MMOL/L (ref 5–15)
BUN BLD-MCNC: 11 MG/DL (ref 8–23)
BUN/CREAT SERPL: 13.8 (ref 7–25)
CALCIUM SPEC-SCNC: 8.8 MG/DL (ref 8.6–10.5)
CHLORIDE SERPL-SCNC: 97 MMOL/L (ref 98–107)
CO2 SERPL-SCNC: 26 MMOL/L (ref 22–29)
CREAT BLD-MCNC: 0.8 MG/DL (ref 0.57–1)
DEPRECATED RDW RBC AUTO: 41.4 FL (ref 37–54)
ERYTHROCYTE [DISTWIDTH] IN BLOOD BY AUTOMATED COUNT: 12.4 % (ref 12.3–15.4)
GFR SERPL CREATININE-BSD FRML MDRD: 88 ML/MIN/1.73
GLUCOSE BLD-MCNC: 214 MG/DL (ref 65–99)
GLUCOSE BLDC GLUCOMTR-MCNC: 217 MG/DL (ref 70–130)
GLUCOSE BLDC GLUCOMTR-MCNC: 311 MG/DL (ref 70–130)
HCT VFR BLD AUTO: 34.3 % (ref 34–46.6)
HGB BLD-MCNC: 11.4 G/DL (ref 12–15.9)
MCH RBC QN AUTO: 30.4 PG (ref 26.6–33)
MCHC RBC AUTO-ENTMCNC: 33.2 G/DL (ref 31.5–35.7)
MCV RBC AUTO: 91.5 FL (ref 79–97)
PLATELET # BLD AUTO: 350 10*3/MM3 (ref 140–450)
PMV BLD AUTO: 9.4 FL (ref 6–12)
POTASSIUM BLD-SCNC: 3.2 MMOL/L (ref 3.5–5.2)
RBC # BLD AUTO: 3.75 10*6/MM3 (ref 3.77–5.28)
SODIUM BLD-SCNC: 138 MMOL/L (ref 136–145)
WBC NRBC COR # BLD: 6.81 10*3/MM3 (ref 3.4–10.8)

## 2020-05-28 PROCEDURE — 94799 UNLISTED PULMONARY SVC/PX: CPT

## 2020-05-28 PROCEDURE — 82962 GLUCOSE BLOOD TEST: CPT

## 2020-05-28 PROCEDURE — 85027 COMPLETE CBC AUTOMATED: CPT | Performed by: INTERNAL MEDICINE

## 2020-05-28 PROCEDURE — 99239 HOSP IP/OBS DSCHRG MGMT >30: CPT | Performed by: NURSE PRACTITIONER

## 2020-05-28 PROCEDURE — 80048 BASIC METABOLIC PNL TOTAL CA: CPT | Performed by: INTERNAL MEDICINE

## 2020-05-28 PROCEDURE — 25010000002 ENOXAPARIN PER 10 MG: Performed by: INTERNAL MEDICINE

## 2020-05-28 RX ORDER — PROMETHAZINE HYDROCHLORIDE 12.5 MG/1
12.5 TABLET ORAL EVERY 6 HOURS PRN
Qty: 20 TABLET | Refills: 0 | Status: SHIPPED | OUTPATIENT
Start: 2020-05-28 | End: 2021-02-12 | Stop reason: SDUPTHER

## 2020-05-28 RX ADMIN — GABAPENTIN 400 MG: 400 CAPSULE ORAL at 05:50

## 2020-05-28 RX ADMIN — NITROFURANTOIN (MONOHYDRATE/MACROCRYSTALS) 100 MG: 75; 25 CAPSULE ORAL at 08:37

## 2020-05-28 RX ADMIN — IPRATROPIUM BROMIDE AND ALBUTEROL SULFATE 3 ML: 2.5; .5 SOLUTION RESPIRATORY (INHALATION) at 07:41

## 2020-05-28 RX ADMIN — POTASSIUM CHLORIDE 40 MEQ: 10 CAPSULE, COATED, EXTENDED RELEASE ORAL at 12:22

## 2020-05-28 RX ADMIN — AMLODIPINE BESYLATE 10 MG: 10 TABLET ORAL at 08:37

## 2020-05-28 RX ADMIN — LISINOPRIL 40 MG: 40 TABLET ORAL at 08:37

## 2020-05-28 RX ADMIN — PANTOPRAZOLE SODIUM 40 MG: 40 TABLET, DELAYED RELEASE ORAL at 05:50

## 2020-05-28 RX ADMIN — ENOXAPARIN SODIUM 40 MG: 40 INJECTION SUBCUTANEOUS at 08:38

## 2020-05-28 RX ADMIN — ASPIRIN 81 MG: 81 TABLET, COATED ORAL at 08:37

## 2020-05-28 RX ADMIN — POTASSIUM CHLORIDE 40 MEQ: 10 CAPSULE, COATED, EXTENDED RELEASE ORAL at 09:28

## 2020-05-28 RX ADMIN — SODIUM CHLORIDE, PRESERVATIVE FREE 10 ML: 5 INJECTION INTRAVENOUS at 08:39

## 2020-05-28 RX ADMIN — IPRATROPIUM BROMIDE AND ALBUTEROL SULFATE 3 ML: 2.5; .5 SOLUTION RESPIRATORY (INHALATION) at 12:15

## 2020-05-28 NOTE — OUTREACH NOTE
Prep Survey      Responses   [facility] facility patient discharged from?  [place]   Is LACE score < 7 ?  No   Eligibility  [facility]   Date of Admission  05/24/20   Date of Discharge  05/28/20   Discharge Disposition  Home or Self Care   Discharge diagnosis  Sepsis secondary to UTI    chronic urinary retention requiring self cath    COVID-19 Test Status  Not tested   Does the patient have one of the following disease processes/diagnoses(primary or secondary)?  Sepsis   Does the patient have Home health ordered?  No   Is there a DME ordered?  No   Is this a private patient?  Yes   Prep survey completed?  Yes          Lisy Rowley RN

## 2020-05-29 ENCOUNTER — TRANSITIONAL CARE MANAGEMENT TELEPHONE ENCOUNTER (OUTPATIENT)
Dept: CALL CENTER | Facility: HOSPITAL | Age: 60
End: 2020-05-29

## 2020-05-29 ENCOUNTER — TELEPHONE (OUTPATIENT)
Dept: INTERNAL MEDICINE | Facility: CLINIC | Age: 60
End: 2020-05-29

## 2020-05-29 DIAGNOSIS — M54.50 CHRONIC MIDLINE LOW BACK PAIN WITHOUT SCIATICA: ICD-10-CM

## 2020-05-29 DIAGNOSIS — G89.29 CHRONIC MIDLINE LOW BACK PAIN WITHOUT SCIATICA: ICD-10-CM

## 2020-05-29 DIAGNOSIS — K21.9 GASTROESOPHAGEAL REFLUX DISEASE, ESOPHAGITIS PRESENCE NOT SPECIFIED: ICD-10-CM

## 2020-05-29 DIAGNOSIS — E11.42 DIABETIC PERIPHERAL NEUROPATHY (HCC): ICD-10-CM

## 2020-05-29 DIAGNOSIS — R19.7 DIARRHEA, UNSPECIFIED TYPE: ICD-10-CM

## 2020-05-29 RX ORDER — TRAMADOL HYDROCHLORIDE 50 MG/1
50 TABLET ORAL 2 TIMES DAILY PRN
Qty: 28 TABLET | Refills: 0 | Status: SHIPPED | OUTPATIENT
Start: 2020-05-29 | End: 2020-09-23 | Stop reason: SDUPTHER

## 2020-05-29 RX ORDER — GABAPENTIN 400 MG/1
400 CAPSULE ORAL 3 TIMES DAILY
Qty: 90 CAPSULE | Refills: 0 | Status: SHIPPED | OUTPATIENT
Start: 2020-05-29 | End: 2020-09-10 | Stop reason: SDUPTHER

## 2020-05-29 RX ORDER — LISINOPRIL 40 MG/1
40 TABLET ORAL DAILY
Qty: 30 TABLET | Refills: 5 | Status: SHIPPED | OUTPATIENT
Start: 2020-05-29 | End: 2021-02-12 | Stop reason: SDUPTHER

## 2020-05-29 RX ORDER — ATORVASTATIN CALCIUM 80 MG/1
80 TABLET, FILM COATED ORAL NIGHTLY
Qty: 30 TABLET | Refills: 5 | Status: SHIPPED | OUTPATIENT
Start: 2020-05-29 | End: 2021-07-12

## 2020-05-29 RX ORDER — AMOXICILLIN 250 MG
2 CAPSULE ORAL 2 TIMES DAILY PRN
Qty: 60 TABLET | Refills: 5 | Status: SHIPPED | OUTPATIENT
Start: 2020-05-29

## 2020-05-29 RX ORDER — DICYCLOMINE HCL 20 MG
20 TABLET ORAL EVERY 6 HOURS PRN
Qty: 60 TABLET | Refills: 5 | Status: SHIPPED | OUTPATIENT
Start: 2020-05-29 | End: 2022-01-14

## 2020-05-29 RX ORDER — AMLODIPINE BESYLATE 10 MG/1
10 TABLET ORAL
Qty: 30 TABLET | Refills: 5 | Status: SHIPPED | OUTPATIENT
Start: 2020-05-29 | End: 2021-08-10

## 2020-05-29 RX ORDER — PANTOPRAZOLE SODIUM 40 MG/1
40 TABLET, DELAYED RELEASE ORAL DAILY
Qty: 30 TABLET | Refills: 5 | Status: SHIPPED | OUTPATIENT
Start: 2020-05-29 | End: 2021-06-15 | Stop reason: SDUPTHER

## 2020-05-29 RX ORDER — DOXAZOSIN MESYLATE 1 MG/1
1 TABLET ORAL NIGHTLY
Qty: 30 TABLET | Refills: 5 | Status: SHIPPED | OUTPATIENT
Start: 2020-05-29 | End: 2021-07-12

## 2020-05-29 RX ORDER — HYDROCHLOROTHIAZIDE 12.5 MG/1
12.5 CAPSULE, GELATIN COATED ORAL DAILY
Qty: 30 CAPSULE | Refills: 5 | Status: SHIPPED | OUTPATIENT
Start: 2020-05-29 | End: 2020-07-01 | Stop reason: SDUPTHER

## 2020-05-29 NOTE — TELEPHONE ENCOUNTER
PATIENT STATES SHE WENT TO THE HOSPITAL ON Sunday 5-24-20 AND WAS DISCHARGED YESTERDAY. SHE STATED SHE TOOK HER MEDICINE WITH HER BUT WHEN SHE GOT HOME SHE DID NOT HAVE ANY OF THE MEDICATIONS  SHE TALKED WITH SECURITY, PHARMACY AND ER AND THEY DO NOT HAVE THEM  PATIENT IS REQUESTING TO HAVE THESE FILLED  [facility] PHARMACY [facility]     PATIENT CALL BACK [phone]    amLODIPine (NORVASC) 10 MG tablet  doxazosin (CARDURA) 1 MG tablet  atorvastatin (LIPITOR) 80 MG tablet  dicyclomine (BENTYL) 20 MG tablet  gabapentin (NEURONTIN) 400 MG   hydrochlorothiazide (MICROZIDE) 12.5 MG   lisinopril (PRINIVIL,ZESTRIL) 40 MG tablet  pantoprazole (PROTONIX) 40 MG EC tablet  sennosides-docusate (senna-docusate sodium) 8.6-50  traMADol (ULTRAM) 50 MG tablet    PATIENT HAS AN APPOINTMENT SCHEDULED ON 6-8-20

## 2020-05-29 NOTE — OUTREACH NOTE
Call Center TCM Note      Responses   [facility] patient discharged from?  [place]   COVID-19 Test Status  Not tested   Does the patient have one of the following disease processes/diagnoses(primary or secondary)?  Sepsis   TCM attempt successful?  Yes   Call start time  1235   Call end time  1239   Discharge diagnosis  Sepsis secondary to UTI    chronic urinary retention requiring self cath    Meds reviewed with patient/caregiver?  Yes   Is the patient having any side effects they believe may be caused by any medication additions or changes?  No   Does the patient have all medications related to this admission filled (includes all antibiotics, inhalers, nebulizers,steroids,etc.)  Yes   Is the patient taking all medications as directed (includes completed medication regime)?  Yes   Medication comments  Pt states her daughter put all her medication bottles into pt purse for ED visit, and pt did not get home with these bottles. She is going to call ED to see if they have them, if not she will call PCP to see what to do.    Does the patient have a primary care provider?   Yes   Comments regarding PCP  Monet DENISE   Does the patient have an appointment with their PCP within 7 days of discharge?  Greater than 7 days   What is preventing the patient from scheduling follow up appointments within 7 days of discharge?  Earlier appointment not available   Has the patient kept scheduled appointments due by today?  Yes   Has home health visited the patient within 72 hours of discharge?  N/A   What DME was ordered?  [facility]   SELF CATH ARTICLES   Did the patient receive a copy of their discharge instructions?  Yes   Nursing interventions  Reviewed instructions with patient   What is the patient's perception of their health status since discharge?  Improving   Is the patient/caregiver able to teach back Sepsis?  S - Shivering,fever or very cold, E - Extreme pain or generalized discomfort (worst  ever,especially abdomen), P - Pale or discolored skin, S - Sleepy, difficult to arouse,confused, I -   I feel like I might die-a feeling of hopelessness, S - Short of breath   Is patient/caregiver able to teach back steps to recovery at home?  Learn about sepsis(sepsis.org), Rest and regain strength, Eat a balanced diet, Talk about feelings with family/friends, Exercise as tolerated, Record milestones and struggles in a journal, Make a list of questions for PCP appoinment   Is the patient/caregiver able to teach back signs and symptoms of worsening condition:  Fever, Edema, High blood glucose without diabetes, Hyperthermia, Rapid heart rate (>90), Shortness of breath/rapid respiratory rate, Altered mental status(confusion/coma)   Is the patient/caregiver able to teach back the hierarchy of who to call/visit for symptoms/problems? PCP, Specialist, Home health nurse, Urgent Care, ED, 911  Yes   TCM call completed?  Yes   Wrap up additional comments  Pt is feeling better, no fever, chills. Edema improved. Pt was still a bit nauseated this morning but Phenergan has resolved this. She was eating breakfast when I called.           Lisy Patel MA    5/29/2020, 12:43

## 2020-05-29 NOTE — TELEPHONE ENCOUNTER
Called and spoke to patient, informed her that all prescriptions were sent to the pharmacy. She verbalized understanding and had no further questions.

## 2020-05-30 LAB
BACTERIA SPEC AEROBE CULT: NORMAL
BACTERIA SPEC AEROBE CULT: NORMAL

## 2020-06-02 ENCOUNTER — TELEPHONE (OUTPATIENT)
Dept: INTERNAL MEDICINE | Facility: CLINIC | Age: 60
End: 2020-06-02

## 2020-06-02 NOTE — TELEPHONE ENCOUNTER
Patient states that she needs a letter stating for her to be off work because she works in the [employer].  Also, she is going to bring FMLA forms for Monet to fill out.  She states that the hospital found her medications.  Does she need to keep these since she has written her all new prescriptions?  She can be reached at [phone]

## 2020-06-03 NOTE — TELEPHONE ENCOUNTER
PT CALLED AND SAID SHE WOULD BE BRINGING BY PAPERWORK MUMTAZ (THUR)  PT ALSO WONDERED ABOUT HER SON COMING IN AND OUT AND IF HE SHOULD GO TO WORK BECAUSE OF PATIENTS CONDITIONS? PTS SON IS NOT A PATIENT IN THE CLINIC    PLEASE ADVISE    [phone]

## 2020-06-04 ENCOUNTER — READMISSION MANAGEMENT (OUTPATIENT)
Dept: CALL CENTER | Facility: HOSPITAL | Age: 60
End: 2020-06-04

## 2020-06-04 NOTE — OUTREACH NOTE
Sepsis Week 2 Survey      Responses   [facility] patient discharged from?  [place]   COVID-19 Test Status  Not tested   Does the patient have one of the following disease processes/diagnoses(primary or secondary)?  Sepsis   Week 2 attempt successful?  No   Unsuccessful attempts  Attempt 1          Lanny Ellington RN

## 2020-06-08 ENCOUNTER — READMISSION MANAGEMENT (OUTPATIENT)
Dept: CALL CENTER | Facility: HOSPITAL | Age: 60
End: 2020-06-08

## 2020-06-08 NOTE — OUTREACH NOTE
General Surgery Week 2 Survey      Responses   [facility] patient discharged from?  [place]   Does the patient have one of the following disease processes/diagnoses(primary or secondary)?  Sepsis   Call start time  1528   Call end time  1533   Discharge diagnosis  Sepsis secondary to UTI    chronic urinary retention requiring self cath    Meds reviewed with patient/caregiver?  Yes   Is the patient having any side effects they believe may be caused by any medication additions or changes?  No   Is the patient taking all medications as directed (includes completed medication regime)?  Yes   Has the patient kept scheduled appointments due by today?  Yes   What DME was ordered?  [facility]   SELF CATH ARTICLES   DME comments  Pt has not received any supplies. I provided her with the number to [facility] and advised her to call them at [phone]   Psychosocial issues?  No   Did the patient receive a copy of their discharge instructions?  Yes   What is the patient's perception of their health status since discharge?  Improving   Is the patient/caregiver able to teach back the hierarchy of who to call/visit for symptoms/problems? PCP, Specialist, Home health nurse, Urgent Care, ED, 911  Yes   Wrap up additional comments  Pt reports she is doing very well at this time.  She denies any needs during this call.          Susan Courtney RN

## 2020-06-16 ENCOUNTER — READMISSION MANAGEMENT (OUTPATIENT)
Dept: CALL CENTER | Facility: HOSPITAL | Age: 60
End: 2020-06-16

## 2020-06-16 NOTE — OUTREACH NOTE
Sepsis Week 3 Survey      Responses   [facility] patient discharged from?  [place]   COVID-19 Test Status  Not tested   Does the patient have one of the following disease processes/diagnoses(primary or secondary)?  Sepsis   Week 3 attempt successful?  No   Unsuccessful attempts  Attempt 1          Natalie Shrestha RN

## 2020-06-18 ENCOUNTER — READMISSION MANAGEMENT (OUTPATIENT)
Dept: CALL CENTER | Facility: HOSPITAL | Age: 60
End: 2020-06-18

## 2020-06-18 NOTE — OUTREACH NOTE
Sepsis Week 3 Survey      Responses   [facility] patient discharged from?  [place]   COVID-19 Test Status  Not tested   Does the patient have one of the following disease processes/diagnoses(primary or secondary)?  Sepsis   Week 3 attempt successful?  No   Unsuccessful attempts  Attempt 2          Natalie Shrestha RN

## 2020-07-01 RX ORDER — HYDROCHLOROTHIAZIDE 12.5 MG/1
12.5 CAPSULE, GELATIN COATED ORAL DAILY
Qty: 30 CAPSULE | Refills: 2 | Status: SHIPPED | OUTPATIENT
Start: 2020-07-01 | End: 2021-03-31 | Stop reason: HOSPADM

## 2020-07-01 NOTE — TELEPHONE ENCOUNTER
PT CALLED REQUESTING A REFILL FOR:  hydroCHLOROthiazide (MICROZIDE) 12.5 MG capsule    [address] [phone] [facility] [phone] FX     PT ALSO NEEDED TO RESCHEDULE HOSPITAL FOLLOW UP   PLEASE ADVISE AT: [id number]

## 2020-08-03 RX ORDER — INSULIN LISPRO 100 U/ML
INJECTION, SOLUTION SUBCUTANEOUS
Qty: 5 PEN | Refills: 5 | Status: SHIPPED | OUTPATIENT
Start: 2020-08-03 | End: 2021-06-15 | Stop reason: SDUPTHER

## 2020-08-03 NOTE — TELEPHONE ENCOUNTER
Caller: Thelma Michael    Relationship: Self    Best call back number: [phone]    Medication needed:   Requested Prescriptions     Pending Prescriptions Disp Refills   • Insulin Lispro (ADMELOG SOLOSTAR) 100 UNIT/ML injection pen 5 pen 5     Sig: Inject 0-14 units under skin into appropriate area based upon sliding scale 3 times daily with meals as needed. Do not exceed 40 units per day.    Basaglar Insulin    When do you need the refill by: 08/05/2020    What details did the patient provide when requesting the medication: Patient is running low and will be out in a few days    Does the patient have less than a 3 day supply:  [x] Yes  [] No    What is the patient's preferred pharmacy: [facility] [address] [place] [address] [phone] [facility] [phone] FX

## 2020-08-04 NOTE — TELEPHONE ENCOUNTER
Caller: Thelma Michael    Relationship: Self    Best call back number: [phone]     Medication needed:   Requested Prescriptions     Pending Prescriptions Disp Refills   • glucose blood (Glucose Meter Test) test strip 100 each 5     Sig: Use as instructed to check blood glucose levels 3 times daily     BASAGLAR      When do you need the refill by: ASAP    What details did the patient provide when requesting the medication: PT IS RETURNING TO [place] AND WOULD LIKE THE MEDS SENT TO THE [facility] IN [place].  PT ALSO STATED THAT SHE NEEDED A REFILL FOR BASAGLAR.    Does the patient have less than a 3 day supply:  [x] Yes  [] No    What is the patient's preferred pharmacy:  [facility] [address] [place] [address] [phone] [facility] [phone]

## 2020-08-05 ENCOUNTER — TELEPHONE (OUTPATIENT)
Dept: INTERNAL MEDICINE | Facility: CLINIC | Age: 60
End: 2020-08-05

## 2020-08-05 NOTE — TELEPHONE ENCOUNTER
I'm confused at what she is asking? Please call and clarify. (is she wanting to take the 20 units, 3 times daily??)

## 2020-08-05 NOTE — TELEPHONE ENCOUNTER
PATIENT IS WONDERING ABOUT HER INSULIN    SHE DOES 20 UNITS AT NIGHT AND THEN DOES A SLIDING SCALE. HER MORNING NUMBERS HAVE BEEN AROUND 111 . SHE IS WONDERING IF YOU WANT HER TO TAKE IN MORNING AND MIDDAY? THE 20 UNITS AT NIGHT SEEMS TO BE WORKING WELL    PH: [phone]

## 2020-08-07 NOTE — TELEPHONE ENCOUNTER
Explained the sliding scale in depth to pt. Advised her to write the sliding scale down for reference. Pt seemed to have a more clear understanding.

## 2020-08-13 ENCOUNTER — LAB (OUTPATIENT)
Dept: LAB | Facility: HOSPITAL | Age: 60
End: 2020-08-13

## 2020-08-13 ENCOUNTER — OFFICE VISIT (OUTPATIENT)
Dept: INTERNAL MEDICINE | Facility: CLINIC | Age: 60
End: 2020-08-13

## 2020-08-13 VITALS
HEIGHT: 68 IN | RESPIRATION RATE: 16 BRPM | TEMPERATURE: 97.5 F | WEIGHT: 130 LBS | BODY MASS INDEX: 19.7 KG/M2 | OXYGEN SATURATION: 98 % | DIASTOLIC BLOOD PRESSURE: 70 MMHG | HEART RATE: 75 BPM | SYSTOLIC BLOOD PRESSURE: 128 MMHG

## 2020-08-13 DIAGNOSIS — Z13.29 THYROID DISORDER SCREENING: ICD-10-CM

## 2020-08-13 DIAGNOSIS — Z79.4 TYPE 2 DIABETES MELLITUS WITH HYPERGLYCEMIA, WITH LONG-TERM CURRENT USE OF INSULIN (HCC): Primary | ICD-10-CM

## 2020-08-13 DIAGNOSIS — R23.3 EASY BRUISING: ICD-10-CM

## 2020-08-13 DIAGNOSIS — E11.65 TYPE 2 DIABETES MELLITUS WITH HYPERGLYCEMIA, WITH LONG-TERM CURRENT USE OF INSULIN (HCC): Primary | ICD-10-CM

## 2020-08-13 DIAGNOSIS — Z13.21 ENCOUNTER FOR VITAMIN DEFICIENCY SCREENING: ICD-10-CM

## 2020-08-13 DIAGNOSIS — I10 ESSENTIAL HYPERTENSION: ICD-10-CM

## 2020-08-13 PROCEDURE — 83036 HEMOGLOBIN GLYCOSYLATED A1C: CPT | Performed by: NURSE PRACTITIONER

## 2020-08-13 PROCEDURE — 99214 OFFICE O/P EST MOD 30 MIN: CPT | Performed by: NURSE PRACTITIONER

## 2020-08-13 PROCEDURE — 82306 VITAMIN D 25 HYDROXY: CPT | Performed by: NURSE PRACTITIONER

## 2020-08-13 PROCEDURE — 80050 GENERAL HEALTH PANEL: CPT | Performed by: NURSE PRACTITIONER

## 2020-08-13 PROCEDURE — 82607 VITAMIN B-12: CPT | Performed by: NURSE PRACTITIONER

## 2020-08-13 PROCEDURE — 82746 ASSAY OF FOLIC ACID SERUM: CPT | Performed by: NURSE PRACTITIONER

## 2020-08-13 NOTE — PROGRESS NOTES
Subjective   Chief Complaint   Patient presents with   • Bleeding/Bruising     Unusual bruising      Thelma Michael is a 59 y.o. female here today for hypertension, diabetes, and easy bruising.  Blood pressure has been stable and at goal.  She is unsure what her blood sugars have been running.  Her mother passed away last week and the stress has caused her blood sugars to be more elevated.  Patient is noncompliant with her insulin and has difficulty controlling her blood sugars.  She has been bruising very easily and has multiple bruises on her body today.  She has not bumped herself or fell.  She denies any increased fatigue.  No recent nosebleeds.  No dark or bloody stools.  No shortness of breath or chest pain.    I have reviewed the following portions of the patient's history and confirmed they are accurate: allergies, current medications, past family history, past medical history, past social history, past surgical history and problem list    I have personally completed the patient's review of systems.    Review of Systems   Constitutional: Positive for fatigue. Negative for activity change, appetite change, chills, diaphoresis, fever, unexpected weight gain and unexpected weight loss.   HENT: Negative for ear discharge, ear pain, mouth sores, nosebleeds, sinus pressure, sneezing and sore throat.    Eyes: Negative for pain, discharge and itching.   Respiratory: Negative for cough, chest tightness, shortness of breath and wheezing.    Cardiovascular: Negative for chest pain and palpitations.   Gastrointestinal: Positive for GERD. Negative for abdominal pain, diarrhea, nausea, vomiting and indigestion.   Endocrine: Negative for heat intolerance, polydipsia and polyphagia.   Genitourinary: Negative for dysuria, flank pain, frequency, hematuria, pelvic pain, pelvic pressure and urgency.   Musculoskeletal: Positive for arthralgias, back pain and myalgias. Negative for gait problem, joint swelling, neck pain  and neck stiffness.   Skin: Negative for color change, pallor and rash.   Allergic/Immunologic: Negative for immunocompromised state.   Neurological: Positive for numbness and memory problem. Negative for seizures, speech difficulty, headache and confusion.   Hematological: Negative for adenopathy. Bruises/bleeds easily.   Psychiatric/Behavioral: Negative for agitation, decreased concentration, sleep disturbance and depressed mood. The patient is nervous/anxious.        Current Outpatient Medications on File Prior to Visit   Medication Sig   • albuterol sulfate  (90 Base) MCG/ACT inhaler Inhale 2 puffs Every 4 (Four) Hours As Needed for Wheezing.   • amLODIPine (NORVASC) 10 MG tablet Take 1 tablet by mouth Daily.   • Ascorbic Acid (VITAMIN C PO) Vitamin C TABS   • aspirin 81 MG tablet Take 1 tablet by mouth Daily.   • atorvastatin (LIPITOR) 80 MG tablet Take 1 tablet by mouth Every Night.   • dicyclomine (Bentyl) 20 MG tablet Take 1 tablet by mouth Every 6 (Six) Hours As Needed (diarrhea).   • doxazosin (CARDURA) 1 MG tablet Take 1 tablet by mouth Every Night.   • gabapentin (NEURONTIN) 400 MG capsule Take 1 capsule by mouth 3 (Three) Times a Day.   • glucagon (GLUCAGON EMERGENCY) 1 MG injection Use as directed; For: Diabetics mellitus   • hydroCHLOROthiazide (MICROZIDE) 12.5 MG capsule Take 1 capsule by mouth Daily.   • Insulin Lispro (ADMELOG SOLOSTAR) 100 UNIT/ML injection pen Inject 0-14 units under skin into appropriate area based upon sliding scale 3 times daily with meals as needed. Do not exceed 40 units per day.   • lisinopril (PRINIVIL,ZESTRIL) 40 MG tablet Take 1 tablet by mouth Daily.   • ondansetron (ZOFRAN) 4 MG tablet Take 1 tablet by mouth Every 6 (Six) Hours As Needed for Nausea or Vomiting.   • pantoprazole (PROTONIX) 40 MG EC tablet Take 1 tablet by mouth Daily.   • promethazine (PHENERGAN) 12.5 MG tablet Take 1 tablet by mouth Every 6 (Six) Hours As Needed for Nausea or Vomiting.   •  "sennosides-docusate (senna-docusate sodium) 8.6-50 MG per tablet Take 2 tablets by mouth 2 (Two) Times a Day As Needed for Constipation.   • traMADol (ULTRAM) 50 MG tablet Take 1 tablet by mouth 2 (Two) Times a Day As Needed for Moderate Pain .   • Blood Glucose Monitoring Suppl (FREESTYLE FREEDOM LITE) w/Device kit 1 kit 3 (Three) Times a Day. TEST; For: Diabetic hypoglycemia, Diabetic peripheral neuropathy   • glucose blood (Glucose Meter Test) test strip Use as instructed to check blood glucose levels 3 times daily   • glucose monitor monitoring kit 1 each As Needed (Check blood sugars 3 times daily.).   • Insulin Pen Needle (BD Pen Needle Cydney U/F) 32G X 4 MM misc Take 1 each by mouth 4 (Four) Times a Day.   • Lancets misc Use as instructed to test blood glucose levels 3 times daily.     No current facility-administered medications on file prior to visit.        Objective   Vitals:    08/13/20 1352   BP: 128/70   Pulse: 75   Resp: 16   Temp: 97.5 °F (36.4 °C)   TempSrc: Temporal   SpO2: 98%   Weight: 59 kg (130 lb)   Height: 172.7 cm (68\")   PainSc: 0-No pain     Body mass index is 19.77 kg/m².    Physical Exam   Constitutional: She is oriented to person, place, and time. She appears well-developed and well-nourished.   HENT:   Head: Normocephalic and atraumatic.   Nose: Nose normal.   Eyes: Pupils are equal, round, and reactive to light. Conjunctivae and lids are normal.   Neck: Trachea normal. No thyromegaly present.   Cardiovascular: Normal rate, regular rhythm and normal heart sounds.   Pulmonary/Chest: Effort normal and breath sounds normal. No respiratory distress.   Neurological: She is alert and oriented to person, place, and time. She has normal strength. GCS eye subscore is 4. GCS verbal subscore is 5. GCS motor subscore is 6.   Skin: Skin is warm and dry. Bruising noted.   Intermittent bruising on left leg and arm.    Psychiatric: Her speech is normal and behavior is normal. Thought content normal. " Her mood appears anxious. Cognition and memory are normal.   Vitals reviewed.      Assessment/Plan   Problem List Items Addressed This Visit        Cardiovascular and Mediastinum    Hypertension    Relevant Orders    CBC (No Diff) (Completed)    Comprehensive Metabolic Panel (Completed)       Endocrine    Type 2 diabetes mellitus with hyperglycemia (CMS/HCC) - Primary    Overview     Chronic issue unstable requiring medication management and monitoring. Will eat well balanced heart healthy diabetic diet, drink adequate water, increase physical activity, and get adequate rest. Will monitor blood sugars daily and keep log for next appt. Will contact office earlier if blood sugars are averaging above 250.            Relevant Orders    CBC (No Diff) (Completed)    Comprehensive Metabolic Panel (Completed)    Hemoglobin A1c (Completed)      Other Visit Diagnoses     Easy bruising      New issue requiring further workup and monitoring.     Relevant Orders    CBC (No Diff) (Completed)    Comprehensive Metabolic Panel (Completed)    TSH Rfx On Abnormal To Free T4 (Completed)    Hemoglobin A1c (Completed)    Vitamin B12 & Folate (Completed)    Vitamin D 25 Hydroxy (Completed)    Thyroid disorder screening        Relevant Orders    TSH Rfx On Abnormal To Free T4 (Completed)    Encounter for vitamin deficiency screening        Relevant Orders    Vitamin B12 & Folate (Completed)    Vitamin D 25 Hydroxy (Completed)             Current Outpatient Medications:   •  albuterol sulfate  (90 Base) MCG/ACT inhaler, Inhale 2 puffs Every 4 (Four) Hours As Needed for Wheezing., Disp: 18 g, Rfl: 5  •  amLODIPine (NORVASC) 10 MG tablet, Take 1 tablet by mouth Daily., Disp: 30 tablet, Rfl: 5  •  Ascorbic Acid (VITAMIN C PO), Vitamin C TABS, Disp: , Rfl:   •  aspirin 81 MG tablet, Take 1 tablet by mouth Daily., Disp: 30 tablet, Rfl: 0  •  atorvastatin (LIPITOR) 80 MG tablet, Take 1 tablet by mouth Every Night., Disp: 30 tablet, Rfl:  5  •  dicyclomine (Bentyl) 20 MG tablet, Take 1 tablet by mouth Every 6 (Six) Hours As Needed (diarrhea)., Disp: 60 tablet, Rfl: 5  •  doxazosin (CARDURA) 1 MG tablet, Take 1 tablet by mouth Every Night., Disp: 30 tablet, Rfl: 5  •  gabapentin (NEURONTIN) 400 MG capsule, Take 1 capsule by mouth 3 (Three) Times a Day., Disp: 90 capsule, Rfl: 0  •  glucagon (GLUCAGON EMERGENCY) 1 MG injection, Use as directed; For: Diabetics mellitus, Disp: , Rfl:   •  hydroCHLOROthiazide (MICROZIDE) 12.5 MG capsule, Take 1 capsule by mouth Daily., Disp: 30 capsule, Rfl: 2  •  Insulin Lispro (ADMELOG SOLOSTAR) 100 UNIT/ML injection pen, Inject 0-14 units under skin into appropriate area based upon sliding scale 3 times daily with meals as needed. Do not exceed 40 units per day., Disp: 5 pen, Rfl: 5  •  lisinopril (PRINIVIL,ZESTRIL) 40 MG tablet, Take 1 tablet by mouth Daily., Disp: 30 tablet, Rfl: 5  •  ondansetron (ZOFRAN) 4 MG tablet, Take 1 tablet by mouth Every 6 (Six) Hours As Needed for Nausea or Vomiting., Disp: 20 tablet, Rfl: 0  •  pantoprazole (PROTONIX) 40 MG EC tablet, Take 1 tablet by mouth Daily., Disp: 30 tablet, Rfl: 5  •  promethazine (PHENERGAN) 12.5 MG tablet, Take 1 tablet by mouth Every 6 (Six) Hours As Needed for Nausea or Vomiting., Disp: 20 tablet, Rfl: 0  •  sennosides-docusate (senna-docusate sodium) 8.6-50 MG per tablet, Take 2 tablets by mouth 2 (Two) Times a Day As Needed for Constipation., Disp: 60 tablet, Rfl: 5  •  traMADol (ULTRAM) 50 MG tablet, Take 1 tablet by mouth 2 (Two) Times a Day As Needed for Moderate Pain ., Disp: 28 tablet, Rfl: 0  •  Blood Glucose Monitoring Suppl (FREESTYLE FREEDOM LITE) w/Device kit, 1 kit 3 (Three) Times a Day. TEST; For: Diabetic hypoglycemia, Diabetic peripheral neuropathy, Disp: 1 each, Rfl: 0  •  ferrous sulfate 325 (65 FE) MG tablet, Take 1 tablet by mouth Daily With Breakfast. Take with orange juice or vitamin C, Disp: 30 tablet, Rfl: 2  •  glucose blood (Glucose  Meter Test) test strip, Use as instructed to check blood glucose levels 3 times daily, Disp: 100 each, Rfl: 5  •  glucose monitor monitoring kit, 1 each As Needed (Check blood sugars 3 times daily.)., Disp: 1 each, Rfl: 0  •  Insulin Glargine (BASAGLAR KWIKPEN) 100 UNIT/ML injection pen, Inject 25 Units under the skin into the appropriate area as directed Every Night., Disp: 5 pen, Rfl: 2  •  Insulin Pen Needle (BD Pen Needle Cydney U/F) 32G X 4 MM misc, Take 1 each by mouth 4 (Four) Times a Day., Disp: 100 each, Rfl: 5  •  Lancets misc, Use as instructed to test blood glucose levels 3 times daily., Disp: 100 each, Rfl: 5  •  vitamin D (ERGOCALCIFEROL) 1.25 MG (70524 UT) capsule capsule, Take 1 capsule by mouth 1 (One) Time Per Week., Disp: 4 capsule, Rfl: 2       Plan of care reviewed with the patient at the conclusion of today's visit.  Education was provided regarding diagnosis, management, and any prescribed or recommended OTC medications.  Patient verbalized understanding of and agreement with management plan.     Return in about 3 months (around 11/13/2020), or if symptoms worsen or fail to improve.      Monet Chau, HOWIE    Please note that portions of this note were completed with a voice recognition program. Efforts were made to edit the dictations, but occasionally words are mistranscribed.

## 2020-08-14 LAB
25(OH)D3 SERPL-MCNC: 22.8 NG/ML (ref 30–100)
ALBUMIN SERPL-MCNC: 4 G/DL (ref 3.5–5.2)
ALBUMIN/GLOB SERPL: 1.5 G/DL
ALP SERPL-CCNC: 76 U/L (ref 39–117)
ALT SERPL W P-5'-P-CCNC: 28 U/L (ref 1–33)
ANION GAP SERPL CALCULATED.3IONS-SCNC: 9.8 MMOL/L (ref 5–15)
AST SERPL-CCNC: 18 U/L (ref 1–32)
BILIRUB SERPL-MCNC: 0.2 MG/DL (ref 0–1.2)
BUN SERPL-MCNC: 23 MG/DL (ref 8–23)
BUN/CREAT SERPL: 23.2 (ref 7–25)
CALCIUM SPEC-SCNC: 9.7 MG/DL (ref 8.6–10.5)
CHLORIDE SERPL-SCNC: 104 MMOL/L (ref 98–107)
CO2 SERPL-SCNC: 29.2 MMOL/L (ref 22–29)
CREAT SERPL-MCNC: 0.99 MG/DL (ref 0.57–1)
DEPRECATED RDW RBC AUTO: 40.8 FL (ref 37–54)
ERYTHROCYTE [DISTWIDTH] IN BLOOD BY AUTOMATED COUNT: 12.5 % (ref 12.3–15.4)
FOLATE SERPL-MCNC: 14.6 NG/ML (ref 4.78–24.2)
GFR SERPL CREATININE-BSD FRML MDRD: 69 ML/MIN/1.73
GLOBULIN UR ELPH-MCNC: 2.6 GM/DL
GLUCOSE SERPL-MCNC: 199 MG/DL (ref 65–99)
HBA1C MFR BLD: 10.77 % (ref 4.8–5.6)
HCT VFR BLD AUTO: 33.2 % (ref 34–46.6)
HGB BLD-MCNC: 11.1 G/DL (ref 12–15.9)
MCH RBC QN AUTO: 30.3 PG (ref 26.6–33)
MCHC RBC AUTO-ENTMCNC: 33.4 G/DL (ref 31.5–35.7)
MCV RBC AUTO: 90.7 FL (ref 79–97)
PLATELET # BLD AUTO: 281 10*3/MM3 (ref 140–450)
PMV BLD AUTO: 10.6 FL (ref 6–12)
POTASSIUM SERPL-SCNC: 4.1 MMOL/L (ref 3.5–5.2)
PROT SERPL-MCNC: 6.6 G/DL (ref 6–8.5)
RBC # BLD AUTO: 3.66 10*6/MM3 (ref 3.77–5.28)
SODIUM SERPL-SCNC: 143 MMOL/L (ref 136–145)
TSH SERPL DL<=0.05 MIU/L-ACNC: 0.41 UIU/ML (ref 0.27–4.2)
VIT B12 BLD-MCNC: 672 PG/ML (ref 211–946)
WBC # BLD AUTO: 6.43 10*3/MM3 (ref 3.4–10.8)

## 2020-08-19 ENCOUNTER — TELEPHONE (OUTPATIENT)
Dept: INTERNAL MEDICINE | Facility: CLINIC | Age: 60
End: 2020-08-19

## 2020-08-20 RX ORDER — ERGOCALCIFEROL 1.25 MG/1
50000 CAPSULE ORAL WEEKLY
Qty: 4 CAPSULE | Refills: 2 | Status: SHIPPED | OUTPATIENT
Start: 2020-08-20 | End: 2020-08-20 | Stop reason: SDUPTHER

## 2020-08-20 RX ORDER — INSULIN GLARGINE 100 [IU]/ML
25 INJECTION, SOLUTION SUBCUTANEOUS NIGHTLY
Qty: 5 PEN | Refills: 2
Start: 2020-08-20 | End: 2020-10-07 | Stop reason: SDUPTHER

## 2020-08-20 RX ORDER — FERROUS SULFATE 325(65) MG
325 TABLET ORAL
Qty: 30 TABLET | Refills: 2 | Status: SHIPPED | OUTPATIENT
Start: 2020-08-20 | End: 2020-08-20 | Stop reason: SDUPTHER

## 2020-08-20 RX ORDER — ERGOCALCIFEROL 1.25 MG/1
50000 CAPSULE ORAL WEEKLY
Qty: 4 CAPSULE | Refills: 2 | Status: SHIPPED | OUTPATIENT
Start: 2020-08-20 | End: 2021-02-12 | Stop reason: SDUPTHER

## 2020-08-20 RX ORDER — FERROUS SULFATE 325(65) MG
325 TABLET ORAL
Qty: 30 TABLET | Refills: 2 | Status: SHIPPED | OUTPATIENT
Start: 2020-08-20 | End: 2022-02-07 | Stop reason: SDUPTHER

## 2020-08-20 NOTE — PROGRESS NOTES
I want her to increase basaglar to 25 units at bedtime. Please ask that she take this every night and use sliding scale during the day. Thanks. I put basaglar back on her med list.

## 2020-08-20 NOTE — PROGRESS NOTES
You are anemic and I am starting you on a once a day iron supplement I want you to take with breakfast.  If you develop any constipation from this I want you to start MiraLAX 1 capful once daily to help with this.  Your vitamin D is low and this contributes to significant fatigue.  I have called in a once a week vitamin supplement for you to start.  Your blood sugars are still running very high.  You are doing the Admelog sliding scale but are you doing any basal insulin such as basal car, Tresiba, Lantus, or Levemir?  You should be taking a dose of this at bedtime.    Please let me know about the basal nighttime insulin.

## 2020-09-10 DIAGNOSIS — E11.42 DIABETIC PERIPHERAL NEUROPATHY (HCC): ICD-10-CM

## 2020-09-10 RX ORDER — GABAPENTIN 400 MG/1
400 CAPSULE ORAL 3 TIMES DAILY
Qty: 90 CAPSULE | Refills: 2 | Status: SHIPPED | OUTPATIENT
Start: 2020-09-10 | End: 2021-06-08 | Stop reason: SDUPTHER

## 2020-09-10 NOTE — TELEPHONE ENCOUNTER
Caller: Thelma Michael    Relationship: Self    Best call back number: [phone]    Medication needed:   Requested Prescriptions     Pending Prescriptions Disp Refills   • gabapentin (NEURONTIN) 400 MG capsule 90 capsule 0     Sig: Take 1 capsule by mouth 3 (Three) Times a Day.       When do you need the refill by: 09/10/2020    What details did the patient provide when requesting the medication: Patient will be out of medication tomorrow.     Does the patient have less than a 3 day supply:  [x] Yes  [] No    What is the patient's preferred pharmacy: [facility] [address] [place] [address] [phone] [facility] [phone]

## 2020-09-23 DIAGNOSIS — M54.50 CHRONIC MIDLINE LOW BACK PAIN WITHOUT SCIATICA: ICD-10-CM

## 2020-09-23 DIAGNOSIS — E11.42 DIABETIC PERIPHERAL NEUROPATHY (HCC): ICD-10-CM

## 2020-09-23 DIAGNOSIS — G89.29 CHRONIC MIDLINE LOW BACK PAIN WITHOUT SCIATICA: ICD-10-CM

## 2020-09-23 RX ORDER — NICOTINE 21 MG/24HR
1 PATCH, TRANSDERMAL 24 HOURS TRANSDERMAL DAILY
Qty: 28 PATCH | Refills: 0 | Status: SHIPPED | OUTPATIENT
Start: 2020-09-23 | End: 2021-03-31 | Stop reason: HOSPADM

## 2020-09-23 NOTE — TELEPHONE ENCOUNTER
PT NEEDS A REFILL ON TRAMADOL AND SHE ALSO WANTS THE PATCHES AGAIN SO SHE CAN STOP SMOKING AGAIN. SEND IT TO [facility].

## 2020-09-24 RX ORDER — TRAMADOL HYDROCHLORIDE 50 MG/1
50 TABLET ORAL 2 TIMES DAILY PRN
Qty: 28 TABLET | Refills: 0 | Status: SHIPPED | OUTPATIENT
Start: 2020-09-24 | End: 2020-10-21 | Stop reason: SDUPTHER

## 2020-10-05 ENCOUNTER — LAB (OUTPATIENT)
Dept: LAB | Facility: HOSPITAL | Age: 60
End: 2020-10-05

## 2020-10-05 ENCOUNTER — OFFICE VISIT (OUTPATIENT)
Dept: INTERNAL MEDICINE | Facility: CLINIC | Age: 60
End: 2020-10-05

## 2020-10-05 ENCOUNTER — PRIOR AUTHORIZATION (OUTPATIENT)
Dept: INTERNAL MEDICINE | Facility: CLINIC | Age: 60
End: 2020-10-05

## 2020-10-05 VITALS
WEIGHT: 123 LBS | HEIGHT: 68 IN | TEMPERATURE: 97.1 F | HEART RATE: 72 BPM | SYSTOLIC BLOOD PRESSURE: 142 MMHG | BODY MASS INDEX: 18.64 KG/M2 | DIASTOLIC BLOOD PRESSURE: 68 MMHG

## 2020-10-05 DIAGNOSIS — F51.01 PRIMARY INSOMNIA: ICD-10-CM

## 2020-10-05 DIAGNOSIS — E11.65 TYPE 2 DIABETES MELLITUS WITH HYPERGLYCEMIA, WITH LONG-TERM CURRENT USE OF INSULIN (HCC): ICD-10-CM

## 2020-10-05 DIAGNOSIS — F41.8 DEPRESSION WITH ANXIETY: Primary | ICD-10-CM

## 2020-10-05 DIAGNOSIS — Z79.4 TYPE 2 DIABETES MELLITUS WITH HYPERGLYCEMIA, WITH LONG-TERM CURRENT USE OF INSULIN (HCC): ICD-10-CM

## 2020-10-05 DIAGNOSIS — D50.8 IRON DEFICIENCY ANEMIA SECONDARY TO INADEQUATE DIETARY IRON INTAKE: ICD-10-CM

## 2020-10-05 LAB
ALBUMIN SERPL-MCNC: 4.4 G/DL (ref 3.5–5.2)
ALBUMIN/GLOB SERPL: 1.6 G/DL
ALP SERPL-CCNC: 114 U/L (ref 39–117)
ALT SERPL W P-5'-P-CCNC: 15 U/L (ref 1–33)
ANION GAP SERPL CALCULATED.3IONS-SCNC: 10.9 MMOL/L (ref 5–15)
AST SERPL-CCNC: 13 U/L (ref 1–32)
BASOPHILS # BLD AUTO: 0.02 10*3/MM3 (ref 0–0.2)
BASOPHILS NFR BLD AUTO: 0.4 % (ref 0–1.5)
BILIRUB SERPL-MCNC: 0.4 MG/DL (ref 0–1.2)
BUN SERPL-MCNC: 15 MG/DL (ref 8–23)
BUN/CREAT SERPL: 15.2 (ref 7–25)
CALCIUM SPEC-SCNC: 9.7 MG/DL (ref 8.6–10.5)
CHLORIDE SERPL-SCNC: 97 MMOL/L (ref 98–107)
CO2 SERPL-SCNC: 29.1 MMOL/L (ref 22–29)
CREAT SERPL-MCNC: 0.99 MG/DL (ref 0.57–1)
DEPRECATED RDW RBC AUTO: 39 FL (ref 37–54)
EOSINOPHIL # BLD AUTO: 0.07 10*3/MM3 (ref 0–0.4)
EOSINOPHIL NFR BLD AUTO: 1.3 % (ref 0.3–6.2)
ERYTHROCYTE [DISTWIDTH] IN BLOOD BY AUTOMATED COUNT: 12.2 % (ref 12.3–15.4)
GFR SERPL CREATININE-BSD FRML MDRD: 69 ML/MIN/1.73
GLOBULIN UR ELPH-MCNC: 2.8 GM/DL
GLUCOSE SERPL-MCNC: 438 MG/DL (ref 65–99)
HCT VFR BLD AUTO: 39.8 % (ref 34–46.6)
HGB BLD-MCNC: 13.9 G/DL (ref 12–15.9)
IMM GRANULOCYTES # BLD AUTO: 0.01 10*3/MM3 (ref 0–0.05)
IMM GRANULOCYTES NFR BLD AUTO: 0.2 % (ref 0–0.5)
IRON 24H UR-MRATE: 76 MCG/DL (ref 37–145)
IRON SATN MFR SERPL: 22 % (ref 20–50)
LYMPHOCYTES # BLD AUTO: 1.93 10*3/MM3 (ref 0.7–3.1)
LYMPHOCYTES NFR BLD AUTO: 35.6 % (ref 19.6–45.3)
MCH RBC QN AUTO: 30.8 PG (ref 26.6–33)
MCHC RBC AUTO-ENTMCNC: 34.9 G/DL (ref 31.5–35.7)
MCV RBC AUTO: 88.1 FL (ref 79–97)
MONOCYTES # BLD AUTO: 0.35 10*3/MM3 (ref 0.1–0.9)
MONOCYTES NFR BLD AUTO: 6.5 % (ref 5–12)
NEUTROPHILS NFR BLD AUTO: 3.04 10*3/MM3 (ref 1.7–7)
NEUTROPHILS NFR BLD AUTO: 56 % (ref 42.7–76)
NRBC BLD AUTO-RTO: 0 /100 WBC (ref 0–0.2)
PLATELET # BLD AUTO: 264 10*3/MM3 (ref 140–450)
PMV BLD AUTO: 12.2 FL (ref 6–12)
POTASSIUM SERPL-SCNC: 4 MMOL/L (ref 3.5–5.2)
PROT SERPL-MCNC: 7.2 G/DL (ref 6–8.5)
RBC # BLD AUTO: 4.52 10*6/MM3 (ref 3.77–5.28)
SODIUM SERPL-SCNC: 137 MMOL/L (ref 136–145)
TIBC SERPL-MCNC: 346 MCG/DL (ref 298–536)
TRANSFERRIN SERPL-MCNC: 232 MG/DL (ref 200–360)
WBC # BLD AUTO: 5.42 10*3/MM3 (ref 3.4–10.8)

## 2020-10-05 PROCEDURE — 80053 COMPREHEN METABOLIC PANEL: CPT | Performed by: NURSE PRACTITIONER

## 2020-10-05 PROCEDURE — 99214 OFFICE O/P EST MOD 30 MIN: CPT | Performed by: NURSE PRACTITIONER

## 2020-10-05 PROCEDURE — 83540 ASSAY OF IRON: CPT | Performed by: NURSE PRACTITIONER

## 2020-10-05 PROCEDURE — 84466 ASSAY OF TRANSFERRIN: CPT | Performed by: NURSE PRACTITIONER

## 2020-10-05 PROCEDURE — 85025 COMPLETE CBC W/AUTO DIFF WBC: CPT | Performed by: NURSE PRACTITIONER

## 2020-10-05 PROCEDURE — 83036 HEMOGLOBIN GLYCOSYLATED A1C: CPT | Performed by: NURSE PRACTITIONER

## 2020-10-05 RX ORDER — TRAZODONE HYDROCHLORIDE 50 MG/1
TABLET ORAL
Qty: 45 TABLET | Refills: 1 | Status: SHIPPED | OUTPATIENT
Start: 2020-10-05 | End: 2021-02-12 | Stop reason: SDUPTHER

## 2020-10-05 RX ORDER — FLUOXETINE HYDROCHLORIDE 20 MG/1
20 CAPSULE ORAL DAILY
Qty: 30 CAPSULE | Refills: 1 | Status: SHIPPED | OUTPATIENT
Start: 2020-10-05 | End: 2021-02-12 | Stop reason: SDUPTHER

## 2020-10-05 NOTE — PROGRESS NOTES
Subjective   Chief Complaint   Patient presents with   • Anxiety      Thelma Michael is a 59 y.o. female here today for for depression, anxiety, insomnia, diabetes, and iron deficiency.  Patient's mother passed away 2 months ago and she is not coping well.  She reports crying frequently and having a difficult time doing anything in her daily routine.  She has episodes of sadness and anxiety.  She is not sleeping well and averaging about 4 hours of broken sleep nightly.  No thoughts of self-harm or harming others.  Blood sugars have been averaging around 180.  She has iron deficiency anemia and has been taking iron supplement daily.  She feels very fatigued and tired.  She become short of breath with exertion but denies chest pain.    I have reviewed the following portions of the patient's history and confirmed they are accurate: allergies, current medications, past family history, past medical history, past social history, past surgical history and problem list    I have personally completed the patient's review of systems.    Review of Systems   Constitutional: Positive for fatigue. Negative for activity change, appetite change, chills, diaphoresis, fever, unexpected weight gain and unexpected weight loss.   HENT: Negative for ear discharge, ear pain, mouth sores, nosebleeds, sinus pressure, sneezing and sore throat.    Eyes: Negative for pain, discharge and itching.   Respiratory: Positive for shortness of breath. Negative for cough, chest tightness and wheezing.    Cardiovascular: Negative for chest pain and palpitations.   Gastrointestinal: Negative for abdominal pain, diarrhea, nausea, vomiting, GERD and indigestion.   Endocrine: Negative for heat intolerance, polydipsia and polyphagia.   Genitourinary: Negative for dysuria, flank pain, frequency, hematuria, pelvic pain, pelvic pressure and urgency.   Musculoskeletal: Positive for arthralgias, back pain and myalgias. Negative for gait problem, joint  swelling, neck pain and neck stiffness.   Skin: Negative for color change, pallor and rash.   Allergic/Immunologic: Negative for immunocompromised state.   Neurological: Positive for numbness and memory problem. Negative for seizures, speech difficulty, headache and confusion.   Hematological: Negative for adenopathy. Bruises/bleeds easily.   Psychiatric/Behavioral: Positive for dysphoric mood, sleep disturbance, depressed mood and stress. Negative for agitation and decreased concentration. The patient is nervous/anxious.        Current Outpatient Medications on File Prior to Visit   Medication Sig   • albuterol sulfate  (90 Base) MCG/ACT inhaler Inhale 2 puffs Every 4 (Four) Hours As Needed for Wheezing.   • amLODIPine (NORVASC) 10 MG tablet Take 1 tablet by mouth Daily.   • Ascorbic Acid (VITAMIN C PO) Vitamin C TABS   • aspirin 81 MG tablet Take 1 tablet by mouth Daily.   • atorvastatin (LIPITOR) 80 MG tablet Take 1 tablet by mouth Every Night.   • Blood Glucose Monitoring Suppl (FREESTYLE FREEDOM LITE) w/Device kit 1 kit 3 (Three) Times a Day. TEST; For: Diabetic hypoglycemia, Diabetic peripheral neuropathy   • dicyclomine (Bentyl) 20 MG tablet Take 1 tablet by mouth Every 6 (Six) Hours As Needed (diarrhea).   • doxazosin (CARDURA) 1 MG tablet Take 1 tablet by mouth Every Night.   • ferrous sulfate 325 (65 FE) MG tablet Take 1 tablet by mouth Daily With Breakfast. Take with orange juice or vitamin C   • gabapentin (NEURONTIN) 400 MG capsule Take 1 capsule by mouth 3 (Three) Times a Day.   • glucagon (GLUCAGON EMERGENCY) 1 MG injection Use as directed; For: Diabetics mellitus   • glucose blood (Glucose Meter Test) test strip Use as instructed to check blood glucose levels 3 times daily   • glucose monitor monitoring kit 1 each As Needed (Check blood sugars 3 times daily.).   • hydroCHLOROthiazide (MICROZIDE) 12.5 MG capsule Take 1 capsule by mouth Daily.   • Insulin Glargine (BASAGLAR KWIKPEN) 100 UNIT/ML  "injection pen Inject 25 Units under the skin into the appropriate area as directed Every Night.   • Insulin Lispro (ADMELOG SOLOSTAR) 100 UNIT/ML injection pen Inject 0-14 units under skin into appropriate area based upon sliding scale 3 times daily with meals as needed. Do not exceed 40 units per day.   • Insulin Pen Needle (BD Pen Needle Cydney U/F) 32G X 4 MM misc Take 1 each by mouth 4 (Four) Times a Day.   • Lancets misc Use as instructed to test blood glucose levels 3 times daily.   • lisinopril (PRINIVIL,ZESTRIL) 40 MG tablet Take 1 tablet by mouth Daily.   • nicotine (Nicotine Step 2) 14 MG/24HR patch Place 1 patch on the skin as directed by provider Daily. as directed; For: Tobacco abuse counseling   • ondansetron (ZOFRAN) 4 MG tablet Take 1 tablet by mouth Every 6 (Six) Hours As Needed for Nausea or Vomiting.   • pantoprazole (PROTONIX) 40 MG EC tablet Take 1 tablet by mouth Daily.   • promethazine (PHENERGAN) 12.5 MG tablet Take 1 tablet by mouth Every 6 (Six) Hours As Needed for Nausea or Vomiting.   • sennosides-docusate (senna-docusate sodium) 8.6-50 MG per tablet Take 2 tablets by mouth 2 (Two) Times a Day As Needed for Constipation.   • traMADol (ULTRAM) 50 MG tablet Take 1 tablet by mouth 2 (Two) Times a Day As Needed for Moderate Pain .   • vitamin D (ERGOCALCIFEROL) 1.25 MG (40701 UT) capsule capsule Take 1 capsule by mouth 1 (One) Time Per Week.     No current facility-administered medications on file prior to visit.        Objective   Vitals:    10/05/20 1155   BP: 142/68   BP Location: Left arm   Patient Position: Sitting   Pulse: 72   Temp: 97.1 °F (36.2 °C)   TempSrc: Infrared   Weight: 55.8 kg (123 lb)   Height: 172.7 cm (67.99\")     Body mass index is 18.71 kg/m².    Physical Exam  Vitals signs reviewed.   Constitutional:       Appearance: Normal appearance. She is well-developed.   HENT:      Head: Normocephalic and atraumatic.      Nose: Nose normal.   Eyes:      General: Lids are normal. "      Conjunctiva/sclera: Conjunctivae normal.      Pupils: Pupils are equal, round, and reactive to light.   Neck:      Thyroid: No thyromegaly.      Trachea: Trachea normal.   Cardiovascular:      Rate and Rhythm: Normal rate and regular rhythm.      Heart sounds: Normal heart sounds.   Pulmonary:      Effort: Pulmonary effort is normal. No respiratory distress.      Breath sounds: Normal breath sounds.   Musculoskeletal:      Lumbar back: She exhibits tenderness and spasm.   Skin:     General: Skin is warm and dry.   Neurological:      Mental Status: She is alert and oriented to person, place, and time.      GCS: GCS eye subscore is 4. GCS verbal subscore is 5. GCS motor subscore is 6.   Psychiatric:         Attention and Perception: Attention normal.         Mood and Affect: Mood is anxious and depressed.         Speech: Speech normal.         Behavior: Behavior normal. Behavior is cooperative.         Thought Content: Thought content normal.         Assessment/Plan   Problem List Items Addressed This Visit        Endocrine    Type 2 diabetes mellitus with hyperglycemia (CMS/HCC)    Overview     Chronic issue unstable requiring medication management and monitoring. Will eat well balanced heart healthy diabetic diet, drink adequate water, increase physical activity, and get adequate rest. Will monitor blood sugars daily and keep log for next appt. Will contact office earlier if blood sugars are averaging above 250.            Relevant Orders    CBC & Differential    Comprehensive Metabolic Panel    Hemoglobin A1c    CBC Auto Differential       Hematopoietic and Hemostatic    Iron deficiency anemia secondary to inadequate dietary iron intake    Overview     Chronic unstable. Follow a high iron diet. Take iron supplement once daily with breakfast. Discussed taking this with vitamin C such as orange juice to increase absorption. Discussed this will make stool dark in color and can cause constipation. Suggested  miralax once daily if constipation occurs.  Will contact office if unable to tolerate supplement.           Relevant Orders    CBC & Differential    Comprehensive Metabolic Panel    Iron Profile    CBC Auto Differential       Other    Depression with anxiety - Primary    Overview     Chronic issue unstable requiring medication management and monitoring. Will eat well balanced diet, increase water intake, increase physical activity during the day, and get adequate rest. Discussed relaxation and coping skills and exercises.   Start prozac.          Relevant Medications    traZODone (DESYREL) 50 MG tablet    FLUoxetine (PROzac) 20 MG capsule    Primary insomnia    Overview     Chronic issue unstable requiring medication management and monitoring. Will eat well balanced diet, drink adequate water decreasing caffeine intake, and increase physical activity during the day. Discussed relaxation and coping skills and exercises. Discussed sleep hygiene and limiting electronic devices prior to bedtime.    Start trazodone.          Relevant Medications    traZODone (DESYREL) 50 MG tablet             Current Outpatient Medications:   •  albuterol sulfate  (90 Base) MCG/ACT inhaler, Inhale 2 puffs Every 4 (Four) Hours As Needed for Wheezing., Disp: 18 g, Rfl: 5  •  amLODIPine (NORVASC) 10 MG tablet, Take 1 tablet by mouth Daily., Disp: 30 tablet, Rfl: 5  •  Ascorbic Acid (VITAMIN C PO), Vitamin C TABS, Disp: , Rfl:   •  aspirin 81 MG tablet, Take 1 tablet by mouth Daily., Disp: 30 tablet, Rfl: 0  •  atorvastatin (LIPITOR) 80 MG tablet, Take 1 tablet by mouth Every Night., Disp: 30 tablet, Rfl: 5  •  Blood Glucose Monitoring Suppl (FREESTYLE FREEDOM LITE) w/Device kit, 1 kit 3 (Three) Times a Day. TEST; For: Diabetic hypoglycemia, Diabetic peripheral neuropathy, Disp: 1 each, Rfl: 0  •  dicyclomine (Bentyl) 20 MG tablet, Take 1 tablet by mouth Every 6 (Six) Hours As Needed (diarrhea)., Disp: 60 tablet, Rfl: 5  •  doxazosin  (CARDURA) 1 MG tablet, Take 1 tablet by mouth Every Night., Disp: 30 tablet, Rfl: 5  •  ferrous sulfate 325 (65 FE) MG tablet, Take 1 tablet by mouth Daily With Breakfast. Take with orange juice or vitamin C, Disp: 30 tablet, Rfl: 2  •  FLUoxetine (PROzac) 20 MG capsule, Take 1 capsule by mouth Daily., Disp: 30 capsule, Rfl: 1  •  gabapentin (NEURONTIN) 400 MG capsule, Take 1 capsule by mouth 3 (Three) Times a Day., Disp: 90 capsule, Rfl: 2  •  glucagon (GLUCAGON EMERGENCY) 1 MG injection, Use as directed; For: Diabetics mellitus, Disp: , Rfl:   •  glucose blood (Glucose Meter Test) test strip, Use as instructed to check blood glucose levels 3 times daily, Disp: 100 each, Rfl: 5  •  glucose monitor monitoring kit, 1 each As Needed (Check blood sugars 3 times daily.)., Disp: 1 each, Rfl: 0  •  hydroCHLOROthiazide (MICROZIDE) 12.5 MG capsule, Take 1 capsule by mouth Daily., Disp: 30 capsule, Rfl: 2  •  Insulin Glargine (BASAGLAR KWIKPEN) 100 UNIT/ML injection pen, Inject 25 Units under the skin into the appropriate area as directed Every Night., Disp: 5 pen, Rfl: 2  •  Insulin Lispro (ADMELOG SOLOSTAR) 100 UNIT/ML injection pen, Inject 0-14 units under skin into appropriate area based upon sliding scale 3 times daily with meals as needed. Do not exceed 40 units per day., Disp: 5 pen, Rfl: 5  •  Insulin Pen Needle (BD Pen Needle Cydney U/F) 32G X 4 MM misc, Take 1 each by mouth 4 (Four) Times a Day., Disp: 100 each, Rfl: 5  •  Lancets misc, Use as instructed to test blood glucose levels 3 times daily., Disp: 100 each, Rfl: 5  •  lisinopril (PRINIVIL,ZESTRIL) 40 MG tablet, Take 1 tablet by mouth Daily., Disp: 30 tablet, Rfl: 5  •  nicotine (Nicotine Step 2) 14 MG/24HR patch, Place 1 patch on the skin as directed by provider Daily. as directed; For: Tobacco abuse counseling, Disp: 28 patch, Rfl: 0  •  ondansetron (ZOFRAN) 4 MG tablet, Take 1 tablet by mouth Every 6 (Six) Hours As Needed for Nausea or Vomiting., Disp: 20  tablet, Rfl: 0  •  pantoprazole (PROTONIX) 40 MG EC tablet, Take 1 tablet by mouth Daily., Disp: 30 tablet, Rfl: 5  •  promethazine (PHENERGAN) 12.5 MG tablet, Take 1 tablet by mouth Every 6 (Six) Hours As Needed for Nausea or Vomiting., Disp: 20 tablet, Rfl: 0  •  sennosides-docusate (senna-docusate sodium) 8.6-50 MG per tablet, Take 2 tablets by mouth 2 (Two) Times a Day As Needed for Constipation., Disp: 60 tablet, Rfl: 5  •  traMADol (ULTRAM) 50 MG tablet, Take 1 tablet by mouth 2 (Two) Times a Day As Needed for Moderate Pain ., Disp: 28 tablet, Rfl: 0  •  traZODone (DESYREL) 50 MG tablet, Take 1-2 tablets one hour before bedtime as needed for sleep., Disp: 45 tablet, Rfl: 1  •  vitamin D (ERGOCALCIFEROL) 1.25 MG (65184 UT) capsule capsule, Take 1 capsule by mouth 1 (One) Time Per Week., Disp: 4 capsule, Rfl: 2       Plan of care reviewed with the patient at the conclusion of today's visit.  Education was provided regarding diagnosis, management, and any prescribed or recommended OTC medications.  Patient verbalized understanding of and agreement with management plan.     Return in about 1 month (around 11/5/2020).      HOWIE Mckenna    Please note that portions of this note were completed with a voice recognition program. Efforts were made to edit the dictations, but occasionally words are mistranscribed.

## 2020-10-05 NOTE — TELEPHONE ENCOUNTER
Prior Authorization for Tramadol [facility] 50mg  Has been approved. Called pt's pharmacy and LVM with above info. Attempted to contact pt to inform. No answer LVM informing of approval and to call back or let us know while she is in office today at 1130 if she has any questions/concerns. Office number given.

## 2020-10-05 NOTE — TELEPHONE ENCOUNTER
Prior Authorization for Tramadol 50mg tablets   completed and sent to plan via Covermymeds. Status pending;      KEY:[id number] [id number] Case ID:[id number]

## 2020-10-06 LAB — HBA1C MFR BLD: 14.61 % (ref 4.8–5.6)

## 2020-10-07 RX ORDER — INSULIN GLARGINE 100 [IU]/ML
30 INJECTION, SOLUTION SUBCUTANEOUS NIGHTLY
Qty: 5 PEN | Refills: 2 | Status: SHIPPED | OUTPATIENT
Start: 2020-10-07 | End: 2020-11-30 | Stop reason: SDUPTHER

## 2020-10-07 NOTE — PROGRESS NOTES
Blood sugar is very elevated and averaging around 400. This is one reason you are feeling so back. This is a big jump from one month ago. I am increasing your basaglar insulin to 30 unit nightly. Continue with the ademelog sliding scale. I need to see you back in 2 weeks to adjust insulin again.  All other labs are normal.

## 2020-10-21 DIAGNOSIS — F51.01 PRIMARY INSOMNIA: ICD-10-CM

## 2020-10-21 DIAGNOSIS — M54.50 CHRONIC MIDLINE LOW BACK PAIN WITHOUT SCIATICA: ICD-10-CM

## 2020-10-21 DIAGNOSIS — E11.42 DIABETIC PERIPHERAL NEUROPATHY (HCC): ICD-10-CM

## 2020-10-21 DIAGNOSIS — G89.29 CHRONIC MIDLINE LOW BACK PAIN WITHOUT SCIATICA: ICD-10-CM

## 2020-10-21 NOTE — TELEPHONE ENCOUNTER
Caller: Thelma Michael    Relationship: Self    Best call back number: [phone]     Medication needed:   Requested Prescriptions     Pending Prescriptions Disp Refills   • traMADol (ULTRAM) 50 MG tablet 28 tablet 0     Sig: Take 1 tablet by mouth 2 (Two) Times a Day As Needed for Moderate Pain .       When do you need the refill by: TODAY    What details did the patient provide when requesting the medication: PT TOOK HER LAST PILL LAST NIGHT.    Does the patient have less than a 3 day supply:  [x] Yes  [] No    What is the patient's preferred pharmacy: [facility] [address] [place] [address] [phone] [facility] [phone] FX       PT ALSO WOULD LIKE TO KNOW IF HER FMLA PAPERWORK HAS BEEN SENT TO HER INSURANCE. PT STATED THAT SHE BROUGHT THIS OVER A FEW WEEKS AGO. PT STATED THAT ORIGINALS WERE SUPPOSE TO BE MAILED BACK TO HER WHEN THIS WAS DONE.    PT ASKED ABOUT AN UPDATE ON BEING EVALUATED FOR FIBROMYALGIA.

## 2020-10-22 RX ORDER — TRAMADOL HYDROCHLORIDE 50 MG/1
50 TABLET ORAL 2 TIMES DAILY PRN
Qty: 28 TABLET | Refills: 0 | Status: SHIPPED | OUTPATIENT
Start: 2020-10-22 | End: 2020-12-11 | Stop reason: SDUPTHER

## 2020-11-23 ENCOUNTER — TELEPHONE (OUTPATIENT)
Dept: INTERNAL MEDICINE | Facility: CLINIC | Age: 60
End: 2020-11-23

## 2020-11-23 NOTE — TELEPHONE ENCOUNTER
Ask her if she can schedule a telehealth appt with me as to why she needs this so it can be documented for her insurance. She can do telephone if need be.

## 2020-11-23 NOTE — TELEPHONE ENCOUNTER
Pt is requesting an order for a back brace. Pt stated she can not return to work until she has this.

## 2020-11-30 ENCOUNTER — OFFICE VISIT (OUTPATIENT)
Dept: INTERNAL MEDICINE | Facility: CLINIC | Age: 60
End: 2020-11-30

## 2020-11-30 ENCOUNTER — TELEPHONE (OUTPATIENT)
Dept: INTERNAL MEDICINE | Facility: CLINIC | Age: 60
End: 2020-11-30

## 2020-11-30 DIAGNOSIS — Z79.4 TYPE 2 DIABETES MELLITUS WITH HYPERGLYCEMIA, WITH LONG-TERM CURRENT USE OF INSULIN (HCC): Primary | ICD-10-CM

## 2020-11-30 DIAGNOSIS — L98.9 SKIN LESION: ICD-10-CM

## 2020-11-30 DIAGNOSIS — M54.42 CHRONIC BILATERAL LOW BACK PAIN WITH BILATERAL SCIATICA: ICD-10-CM

## 2020-11-30 DIAGNOSIS — M54.41 CHRONIC BILATERAL LOW BACK PAIN WITH BILATERAL SCIATICA: ICD-10-CM

## 2020-11-30 DIAGNOSIS — G89.29 CHRONIC BILATERAL LOW BACK PAIN WITH BILATERAL SCIATICA: ICD-10-CM

## 2020-11-30 DIAGNOSIS — R42 VERTIGO: ICD-10-CM

## 2020-11-30 DIAGNOSIS — E11.65 TYPE 2 DIABETES MELLITUS WITH HYPERGLYCEMIA, WITH LONG-TERM CURRENT USE OF INSULIN (HCC): Primary | ICD-10-CM

## 2020-11-30 PROCEDURE — 99443 PR PHYS/QHP TELEPHONE EVALUATION 21-30 MIN: CPT | Performed by: NURSE PRACTITIONER

## 2020-11-30 RX ORDER — MECLIZINE HYDROCHLORIDE 25 MG/1
TABLET ORAL
Qty: 60 TABLET | Refills: 1 | Status: SHIPPED | OUTPATIENT
Start: 2020-11-30 | End: 2021-03-31 | Stop reason: HOSPADM

## 2020-11-30 RX ORDER — INSULIN GLARGINE 100 [IU]/ML
35 INJECTION, SOLUTION SUBCUTANEOUS NIGHTLY
Qty: 5 PEN | Refills: 2 | Status: SHIPPED | OUTPATIENT
Start: 2020-11-30 | End: 2021-04-02 | Stop reason: SDUPTHER

## 2020-11-30 NOTE — TELEPHONE ENCOUNTER
Patient had repeat colonoscopy in 2017 or 2018 she thinks at [facility]. (she had one with [facility] in 2016 - I have results on it). Can you call over and see if they can send us records. Thanks.

## 2020-11-30 NOTE — PROGRESS NOTES
Subjective   Chief Complaint   Patient presents with   • Diabetes   • Back Pain      This is Telephone visit.  You have chosen to receive care through a telephone visit today. Do you consent to use a telephone visit for your medical care today? Yes    Thelma Michael is a 59 y.o. female here today for diabetes, back pain, and vertigo.  Blood sugars have been averaging around 200.  She had one blood sugar that was 300 upon waking in the morning.  She reports following a heart healthy diabetic diet.  She is trying to be physically active.  She had a fall many years ago and injured multiple lumbar disks.  She has had chronic pain since.  Pain has recently worsened and she is wanting a back brace and physical therapy.  No recent imaging.  She has sciatica that shoots down both legs.  She developed vertigo several years ago and has had this intermittently since.  For the past week she has felt dizzy and off-balance.  She is concerned she may have an ear infection but was unable to make it into the office today for exam.  She feels off balance when she moves her head quickly.  Symptoms are currently mild.  No shortness of breath or chest pain. Has skin lesion wants checked out by dermatology.     Addendum 12/28/20 for back brace:  Patient's lumbar xray showed mild degenerative disc disease.     I have reviewed the following portions of the patient's history and confirmed they are accurate: allergies, current medications, past family history, past medical history, past social history, past surgical history and problem list    I have personally completed the patient's review of systems.    Review of Systems   Constitutional: Positive for fatigue. Negative for activity change, appetite change, chills, diaphoresis, fever, unexpected weight gain and unexpected weight loss.   HENT: Negative for ear discharge, ear pain, mouth sores, nosebleeds, sinus pressure, sneezing and sore throat.    Eyes: Negative for pain, discharge  and itching.   Respiratory: Negative for cough, chest tightness, shortness of breath and wheezing.    Cardiovascular: Negative for chest pain and palpitations.   Gastrointestinal: Negative for abdominal pain, diarrhea, nausea, vomiting, GERD and indigestion.   Endocrine: Negative for heat intolerance, polydipsia and polyphagia.   Genitourinary: Negative for dysuria, flank pain, frequency, hematuria, pelvic pain, pelvic pressure and urgency.   Musculoskeletal: Positive for arthralgias, back pain and myalgias. Negative for gait problem, joint swelling, neck pain and neck stiffness.   Skin: Positive for skin lesions. Negative for color change, pallor and rash.   Allergic/Immunologic: Negative for immunocompromised state.   Neurological: Positive for dizziness, light-headedness, numbness and memory problem. Negative for seizures, speech difficulty, headache and confusion.   Hematological: Negative for adenopathy. Bruises/bleeds easily.   Psychiatric/Behavioral: Positive for sleep disturbance, depressed mood and stress. Negative for agitation, decreased concentration and dysphoric mood. The patient is nervous/anxious.        Current Outpatient Medications on File Prior to Visit   Medication Sig   • albuterol sulfate  (90 Base) MCG/ACT inhaler Inhale 2 puffs Every 4 (Four) Hours As Needed for Wheezing.   • amLODIPine (NORVASC) 10 MG tablet Take 1 tablet by mouth Daily.   • Ascorbic Acid (VITAMIN C PO) Vitamin C TABS   • aspirin 81 MG tablet Take 1 tablet by mouth Daily.   • atorvastatin (LIPITOR) 80 MG tablet Take 1 tablet by mouth Every Night.   • Blood Glucose Monitoring Suppl (FREESTYLE FREEDOM LITE) w/Device kit 1 kit 3 (Three) Times a Day. TEST; For: Diabetic hypoglycemia, Diabetic peripheral neuropathy   • dicyclomine (Bentyl) 20 MG tablet Take 1 tablet by mouth Every 6 (Six) Hours As Needed (diarrhea).   • doxazosin (CARDURA) 1 MG tablet Take 1 tablet by mouth Every Night.   • ferrous sulfate 325 (65 FE) MG  tablet Take 1 tablet by mouth Daily With Breakfast. Take with orange juice or vitamin C   • FLUoxetine (PROzac) 20 MG capsule Take 1 capsule by mouth Daily.   • gabapentin (NEURONTIN) 400 MG capsule Take 1 capsule by mouth 3 (Three) Times a Day.   • glucagon (GLUCAGON EMERGENCY) 1 MG injection Use as directed; For: Diabetics mellitus   • glucose blood (Glucose Meter Test) test strip Use as instructed to check blood glucose levels 3 times daily   • glucose monitor monitoring kit 1 each As Needed (Check blood sugars 3 times daily.).   • hydroCHLOROthiazide (MICROZIDE) 12.5 MG capsule Take 1 capsule by mouth Daily.   • Insulin Lispro (ADMELOG SOLOSTAR) 100 UNIT/ML injection pen Inject 0-14 units under skin into appropriate area based upon sliding scale 3 times daily with meals as needed. Do not exceed 40 units per day.   • Insulin Pen Needle (BD Pen Needle Cydney U/F) 32G X 4 MM misc Take 1 each by mouth 4 (Four) Times a Day.   • Lancets misc Use as instructed to test blood glucose levels 3 times daily.   • lisinopril (PRINIVIL,ZESTRIL) 40 MG tablet Take 1 tablet by mouth Daily.   • nicotine (Nicotine Step 2) 14 MG/24HR patch Place 1 patch on the skin as directed by provider Daily. as directed; For: Tobacco abuse counseling   • ondansetron (ZOFRAN) 4 MG tablet Take 1 tablet by mouth Every 6 (Six) Hours As Needed for Nausea or Vomiting.   • pantoprazole (PROTONIX) 40 MG EC tablet Take 1 tablet by mouth Daily.   • promethazine (PHENERGAN) 12.5 MG tablet Take 1 tablet by mouth Every 6 (Six) Hours As Needed for Nausea or Vomiting.   • sennosides-docusate (senna-docusate sodium) 8.6-50 MG per tablet Take 2 tablets by mouth 2 (Two) Times a Day As Needed for Constipation.   • traMADol (ULTRAM) 50 MG tablet Take 1 tablet by mouth 2 (Two) Times a Day As Needed for Moderate Pain .   • traZODone (DESYREL) 50 MG tablet Take 1-2 tablets one hour before bedtime as needed for sleep.   • vitamin D (ERGOCALCIFEROL) 1.25 MG (71349 UT)  capsule capsule Take 1 capsule by mouth 1 (One) Time Per Week.     No current facility-administered medications on file prior to visit.        Objective   There were no vitals filed for this visit.  There is no height or weight on file to calculate BMI.    Physical Exam  Pulmonary:      Effort: No respiratory distress.   Neurological:      Mental Status: She is alert and oriented to person, place, and time.   Psychiatric:         Attention and Perception: Attention normal.         Mood and Affect: Mood normal.         Speech: Speech normal.         Behavior: Behavior is cooperative.         Thought Content: Thought content normal.         Assessment/Plan   Problem List Items Addressed This Visit        Endocrine    Type 2 diabetes mellitus with hyperglycemia (CMS/[facility]) - Primary    Overview     Chronic issue unstable requiring medication management and monitoring. Will eat well balanced heart healthy diabetic diet, drink adequate water, increase physical activity, and get adequate rest. Will monitor blood sugars daily and keep log for next appt. Will contact office earlier if blood sugars are averaging above 250.   Increase basaglar to 35 units.          Relevant Medications    Insulin Glargine (BASAGLAR KWIKPEN) 100 UNIT/ML injection pen       Nervous and Auditory    Back pain  Chronic issue unstable requiring medication management and monitoring. Will eat well balanced diet, increase water intake, increase physical activity as tolerated, and get adequate rest. Can use warmth or ice for discomfort in this area. Discussed stretching exercises.   Will need back brace called into [facility] after xray is resulted.     Relevant Orders    XR Spine Lumbar Complete 4+VW (Completed)    Ambulatory Referral to Physical Therapy       Other    Vertigo  Recurrent issue requiring medication management. Will drink adequate water, eat well balanced diet, and get adequate sleep. Refrain from caffeine and alcohol.     Relevant  Medications    meclizine (ANTIVERT) 25 MG tablet      Other Visit Diagnoses     Skin lesion      New issue requiring referral to speciality.     Relevant Orders    Ambulatory Referral to Dermatology             Current Outpatient Medications:   •  albuterol sulfate  (90 Base) MCG/ACT inhaler, Inhale 2 puffs Every 4 (Four) Hours As Needed for Wheezing., Disp: 18 g, Rfl: 5  •  amLODIPine (NORVASC) 10 MG tablet, Take 1 tablet by mouth Daily., Disp: 30 tablet, Rfl: 5  •  Ascorbic Acid (VITAMIN C PO), Vitamin C TABS, Disp: , Rfl:   •  aspirin 81 MG tablet, Take 1 tablet by mouth Daily., Disp: 30 tablet, Rfl: 0  •  atorvastatin (LIPITOR) 80 MG tablet, Take 1 tablet by mouth Every Night., Disp: 30 tablet, Rfl: 5  •  Blood Glucose Monitoring Suppl (FREESTYLE FREEDOM LITE) w/Device kit, 1 kit 3 (Three) Times a Day. TEST; For: Diabetic hypoglycemia, Diabetic peripheral neuropathy, Disp: 1 each, Rfl: 0  •  dicyclomine (Bentyl) 20 MG tablet, Take 1 tablet by mouth Every 6 (Six) Hours As Needed (diarrhea)., Disp: 60 tablet, Rfl: 5  •  doxazosin (CARDURA) 1 MG tablet, Take 1 tablet by mouth Every Night., Disp: 30 tablet, Rfl: 5  •  ferrous sulfate 325 (65 FE) MG tablet, Take 1 tablet by mouth Daily With Breakfast. Take with orange juice or vitamin C, Disp: 30 tablet, Rfl: 2  •  FLUoxetine (PROzac) 20 MG capsule, Take 1 capsule by mouth Daily., Disp: 30 capsule, Rfl: 1  •  gabapentin (NEURONTIN) 400 MG capsule, Take 1 capsule by mouth 3 (Three) Times a Day., Disp: 90 capsule, Rfl: 2  •  glucagon (GLUCAGON EMERGENCY) 1 MG injection, Use as directed; For: Diabetics mellitus, Disp: , Rfl:   •  glucose blood (Glucose Meter Test) test strip, Use as instructed to check blood glucose levels 3 times daily, Disp: 100 each, Rfl: 5  •  glucose monitor monitoring kit, 1 each As Needed (Check blood sugars 3 times daily.)., Disp: 1 each, Rfl: 0  •  hydroCHLOROthiazide (MICROZIDE) 12.5 MG capsule, Take 1 capsule by mouth Daily., Disp: 30  capsule, Rfl: 2  •  Insulin Glargine (BASAGLAR KWIKPEN) 100 UNIT/ML injection pen, Inject 35 Units under the skin into the appropriate area as directed Every Night., Disp: 5 pen, Rfl: 2  •  Insulin Lispro (ADMELOG SOLOSTAR) 100 UNIT/ML injection pen, Inject 0-14 units under skin into appropriate area based upon sliding scale 3 times daily with meals as needed. Do not exceed 40 units per day., Disp: 5 pen, Rfl: 5  •  Insulin Pen Needle (BD Pen Needle Cydney U/F) 32G X 4 MM misc, Take 1 each by mouth 4 (Four) Times a Day., Disp: 100 each, Rfl: 5  •  Lancets misc, Use as instructed to test blood glucose levels 3 times daily., Disp: 100 each, Rfl: 5  •  lisinopril (PRINIVIL,ZESTRIL) 40 MG tablet, Take 1 tablet by mouth Daily., Disp: 30 tablet, Rfl: 5  •  meclizine (ANTIVERT) 25 MG tablet, Take 1/2-1 tablet by mouth 3 times daily as needed for dizziness., Disp: 60 tablet, Rfl: 1  •  nicotine (Nicotine Step 2) 14 MG/24HR patch, Place 1 patch on the skin as directed by provider Daily. as directed; For: Tobacco abuse counseling, Disp: 28 patch, Rfl: 0  •  ondansetron (ZOFRAN) 4 MG tablet, Take 1 tablet by mouth Every 6 (Six) Hours As Needed for Nausea or Vomiting., Disp: 20 tablet, Rfl: 0  •  pantoprazole (PROTONIX) 40 MG EC tablet, Take 1 tablet by mouth Daily., Disp: 30 tablet, Rfl: 5  •  promethazine (PHENERGAN) 12.5 MG tablet, Take 1 tablet by mouth Every 6 (Six) Hours As Needed for Nausea or Vomiting., Disp: 20 tablet, Rfl: 0  •  sennosides-docusate (senna-docusate sodium) 8.6-50 MG per tablet, Take 2 tablets by mouth 2 (Two) Times a Day As Needed for Constipation., Disp: 60 tablet, Rfl: 5  •  traMADol (ULTRAM) 50 MG tablet, Take 1 tablet by mouth 2 (Two) Times a Day As Needed for Moderate Pain ., Disp: 28 tablet, Rfl: 0  •  traZODone (DESYREL) 50 MG tablet, Take 1-2 tablets one hour before bedtime as needed for sleep., Disp: 45 tablet, Rfl: 1  •  vitamin D (ERGOCALCIFEROL) 1.25 MG (27200 UT) capsule capsule, Take 1  capsule by mouth 1 (One) Time Per Week., Disp: 4 capsule, Rfl: 2       Plan of care reviewed with the patient at the conclusion of today's visit.  Education was provided regarding diagnosis, management, and any prescribed or recommended OTC medications.  Patient verbalized understanding of and agreement with management plan.     Return in about 2 weeks (around 12/14/2020), or if symptoms worsen or fail to improve.     I spent  25 minutes in medical discussion with patient during this visit.     Monet Chau, APRKIA    Please note that portions of this note were completed with a voice recognition program. Efforts were made to edit the dictations, but occasionally words are mistranscribed.

## 2020-12-03 ENCOUNTER — HOSPITAL ENCOUNTER (OUTPATIENT)
Dept: GENERAL RADIOLOGY | Facility: HOSPITAL | Age: 60
Discharge: HOME | End: 2020-12-03
Admitting: NURSE PRACTITIONER

## 2020-12-03 DIAGNOSIS — G89.29 CHRONIC BILATERAL LOW BACK PAIN WITH BILATERAL SCIATICA: ICD-10-CM

## 2020-12-03 DIAGNOSIS — M54.41 CHRONIC BILATERAL LOW BACK PAIN WITH BILATERAL SCIATICA: ICD-10-CM

## 2020-12-03 DIAGNOSIS — M54.42 CHRONIC BILATERAL LOW BACK PAIN WITH BILATERAL SCIATICA: ICD-10-CM

## 2020-12-03 PROCEDURE — 72110 X-RAY EXAM L-2 SPINE 4/>VWS: CPT

## 2020-12-07 NOTE — PROGRESS NOTES
Back xray shows mild arthritis but otherwise normal. I placed the order for physical therapy. Let's see how this goes and if pain does not improve will order lumbar MRI. I will send order for back brace to [facility]. Give them a call later in the day to see if insurance has approved this. If insurance will not pay please let me know and I will see if there is a program to help with this.

## 2020-12-08 ENCOUNTER — TELEPHONE (OUTPATIENT)
Dept: INTERNAL MEDICINE | Facility: CLINIC | Age: 60
End: 2020-12-08

## 2020-12-08 NOTE — TELEPHONE ENCOUNTER
Please advise. Information on time / type of brace / any information I can reiterate to patient regarding back brace?

## 2020-12-08 NOTE — TELEPHONE ENCOUNTER
PT STATES THAT PATIENT AIDS GAVE HER THE NUMBER FOR [facility] AND THAT' WERE THE PRESCRIPTION FOR BRACE NEEDS TO BE FAXED    PT STATES THE PRESCRIPTION NEEDS TO INCLUDE WHO ORDERED IT AND WHY? PT STATES TO ADD THAT PT IS GETTING THE BRACE FOR PAIN AND MOBILITY TO BE ABLE TO PERFORM HER JOB.    [facility]   [facility]  FAX#  [phone]      PLE[facility]  [phone]

## 2020-12-11 DIAGNOSIS — M54.50 CHRONIC MIDLINE LOW BACK PAIN WITHOUT SCIATICA: ICD-10-CM

## 2020-12-11 DIAGNOSIS — E11.42 DIABETIC PERIPHERAL NEUROPATHY (HCC): ICD-10-CM

## 2020-12-11 DIAGNOSIS — G89.29 CHRONIC MIDLINE LOW BACK PAIN WITHOUT SCIATICA: ICD-10-CM

## 2020-12-11 RX ORDER — TRAMADOL HYDROCHLORIDE 50 MG/1
50 TABLET ORAL 2 TIMES DAILY PRN
Qty: 28 TABLET | Refills: 0 | Status: SHIPPED | OUTPATIENT
Start: 2020-12-11 | End: 2021-02-12 | Stop reason: SDUPTHER

## 2020-12-11 NOTE — TELEPHONE ENCOUNTER
Caller: Thelma Michael    Relationship: Self    Best call back number: [phone]    Medication needed:   Requested Prescriptions     Pending Prescriptions Disp Refills   • traMADol (ULTRAM) 50 MG tablet 28 tablet 0     Sig: Take 1 tablet by mouth 2 (Two) Times a Day As Needed for Moderate Pain .       When do you need the refill by: ASAP    What details did the patient provide when requesting the medication: PATIENT IS REQUESTING A NEW PRESCRIPTION WITH REFILLS. PATIENT IS OUT OF REFILLS    Does the patient have less than a 3 day supply:  [x] Yes  [] No    What is the patient's preferred pharmacy: [facility] [address] [place] [address] [phone] [facility] [phone]

## 2020-12-28 ENCOUNTER — TELEPHONE (OUTPATIENT)
Dept: INTERNAL MEDICINE | Facility: CLINIC | Age: 60
End: 2020-12-28

## 2020-12-28 NOTE — TELEPHONE ENCOUNTER
PLEASE HAVE MARKY DO A ADDENDUM ON THE X-RAY WERE IT SHOWED MILD D.JKEILA. [facility] NEEDS THAT B-4 THEY CAN GO A FARTHER   FOR THIS PATIENT' ORDER FOR A BACK BRACE FAX [phone]

## 2021-01-28 ENCOUNTER — OFFICE VISIT (OUTPATIENT)
Dept: INTERNAL MEDICINE | Facility: CLINIC | Age: 61
End: 2021-01-28

## 2021-01-28 DIAGNOSIS — R19.7 DIARRHEA, UNSPECIFIED TYPE: ICD-10-CM

## 2021-01-28 DIAGNOSIS — R09.81 CONGESTION OF NASAL SINUS: Primary | ICD-10-CM

## 2021-01-28 DIAGNOSIS — R11.2 NON-INTRACTABLE VOMITING WITH NAUSEA, UNSPECIFIED VOMITING TYPE: ICD-10-CM

## 2021-01-28 PROCEDURE — 99213 OFFICE O/P EST LOW 20 MIN: CPT | Performed by: NURSE PRACTITIONER

## 2021-01-28 NOTE — PROGRESS NOTES
"HPI  Thelma Michael is a 60 y.o. female presents for congestion, HA, N/V/D and a decrease in taste.  The symptoms started about 5 days ago but has worsened.  Pt reports also having a low grade fever.  Pt states she was feeling SOB but that has resolved.  Also reports feeling fatigued.  She went to the [facility] 2 days ago for a COVID test but has not gotten the results yet.  The N/V/D has resolved.  Pt states she has been taking Mucinex and is feeling better at this time.  She still has a headache with a runny nose.      The following portions of the patient's history were reviewed and updated as appropriate: {history reviewed:20406::\"allergies\",\"current medications\",\"past family history\",\"past medical history\",\"past social history\",\"past surgical history\",\"problem list\"}.    Subjective  Review of Systems    Objective  There were no vitals taken for this visit.     Physical Exam     Procedures     Assessment and Plan  There are no diagnoses linked to this encounter.    No follow-ups on file.    Jordan Sepulveda, APRN       "

## 2021-01-28 NOTE — PROGRESS NOTES
Subjective   Chief Complaint   Patient presents with   • Nasal Congestion      This is Telephone visit.  You have chosen to receive care through a telephone visit today. Do you consent to use a telephone visit for your medical care today? Yes    Thelma Michael is a 60 y.o. female presents for congestion, HA, N/V/D and a decrease in taste.  The symptoms started about 5 days ago but has worsened.  Pt reports also having a low grade fever.  Pt states she was feeling SOB but that has resolved.  Also reports feeling fatigued.  She went to the [facility] 2 days ago for a COVID test but has not gotten the results yet.  The N/V/D has resolved.  Pt states she has been taking Mucinex and is feeling better at this time.  She still has a headache with a runny nose.  Pt is requesting a work note for a few days.    I have reviewed the following portions of the patient's history and confirmed they are accurate: allergies, current medications, past medical history, past social history, past surgical history and problem list    I have personally completed the patient's review of systems.    Review of Systems   Constitutional: Positive for fatigue and fever. Negative for activity change and appetite change.   HENT: Positive for congestion and sinus pressure. Negative for trouble swallowing.    Respiratory: Positive for cough and shortness of breath. Negative for chest tightness and wheezing.    Cardiovascular: Negative for chest pain and leg swelling.   Gastrointestinal: Positive for diarrhea, nausea and vomiting. Negative for abdominal pain.   Neurological: Positive for headache. Negative for dizziness, weakness and confusion.   Psychiatric/Behavioral: Negative for behavioral problems and decreased concentration.       Current Outpatient Medications on File Prior to Visit   Medication Sig   • albuterol sulfate  (90 Base) MCG/ACT inhaler Inhale 2 puffs Every 4 (Four) Hours As Needed for Wheezing.   • amLODIPine  (NORVASC) 10 MG tablet Take 1 tablet by mouth Daily.   • Ascorbic Acid (VITAMIN C PO) Vitamin C TABS   • aspirin 81 MG tablet Take 1 tablet by mouth Daily.   • atorvastatin (LIPITOR) 80 MG tablet Take 1 tablet by mouth Every Night.   • Blood Glucose Monitoring Suppl (FREESTYLE FREEDOM LITE) w/Device kit 1 kit 3 (Three) Times a Day. TEST; For: Diabetic hypoglycemia, Diabetic peripheral neuropathy   • dicyclomine (Bentyl) 20 MG tablet Take 1 tablet by mouth Every 6 (Six) Hours As Needed (diarrhea).   • doxazosin (CARDURA) 1 MG tablet Take 1 tablet by mouth Every Night.   • ferrous sulfate 325 (65 FE) MG tablet Take 1 tablet by mouth Daily With Breakfast. Take with orange juice or vitamin C   • FLUoxetine (PROzac) 20 MG capsule Take 1 capsule by mouth Daily.   • gabapentin (NEURONTIN) 400 MG capsule Take 1 capsule by mouth 3 (Three) Times a Day.   • glucagon (GLUCAGON EMERGENCY) 1 MG injection Use as directed; For: Diabetics mellitus   • glucose blood (Glucose Meter Test) test strip Use as instructed to check blood glucose levels 3 times daily   • glucose monitor monitoring kit 1 each As Needed (Check blood sugars 3 times daily.).   • hydroCHLOROthiazide (MICROZIDE) 12.5 MG capsule Take 1 capsule by mouth Daily.   • Insulin Glargine (BASAGLAR KWIKPEN) 100 UNIT/ML injection pen Inject 35 Units under the skin into the appropriate area as directed Every Night.   • Insulin Lispro (ADMELOG SOLOSTAR) 100 UNIT/ML injection pen Inject 0-14 units under skin into appropriate area based upon sliding scale 3 times daily with meals as needed. Do not exceed 40 units per day.   • Insulin Pen Needle (BD Pen Needle Cydney U/F) 32G X 4 MM misc Take 1 each by mouth 4 (Four) Times a Day.   • Lancets misc Use as instructed to test blood glucose levels 3 times daily.   • lisinopril (PRINIVIL,ZESTRIL) 40 MG tablet Take 1 tablet by mouth Daily.   • meclizine (ANTIVERT) 25 MG tablet Take 1/2-1 tablet by mouth 3 times daily as needed for  dizziness.   • nicotine (Nicotine Step 2) 14 MG/24HR patch Place 1 patch on the skin as directed by provider Daily. as directed; For: Tobacco abuse counseling   • ondansetron (ZOFRAN) 4 MG tablet Take 1 tablet by mouth Every 6 (Six) Hours As Needed for Nausea or Vomiting.   • pantoprazole (PROTONIX) 40 MG EC tablet Take 1 tablet by mouth Daily.   • promethazine (PHENERGAN) 12.5 MG tablet Take 1 tablet by mouth Every 6 (Six) Hours As Needed for Nausea or Vomiting.   • sennosides-docusate (senna-docusate sodium) 8.6-50 MG per tablet Take 2 tablets by mouth 2 (Two) Times a Day As Needed for Constipation.   • traMADol (ULTRAM) 50 MG tablet Take 1 tablet by mouth 2 (Two) Times a Day As Needed for Moderate Pain .   • traZODone (DESYREL) 50 MG tablet Take 1-2 tablets one hour before bedtime as needed for sleep.   • vitamin D (ERGOCALCIFEROL) 1.25 MG (72161 UT) capsule capsule Take 1 capsule by mouth 1 (One) Time Per Week.     No current facility-administered medications on file prior to visit.        Objective   There were no vitals filed for this visit.  There is no height or weight on file to calculate BMI.    Physical Exam  Pulmonary:      Comments: Speaking full sentences without difficulty  Neurological:      Mental Status: She is alert and oriented to person, place, and time.   Psychiatric:         Mood and Affect: Mood normal.         Assessment/Plan   Problem List Items Addressed This Visit     None      Visit Diagnoses     Congestion of nasal sinus    -  Primary    Non-intractable vomiting with nausea, unspecified vomiting type        Diarrhea, unspecified type             rest and fluids  Speaking full sentences without difficulty  Symptoms are improving  N/V/D has resolved  Pt to notify office of COVID results  Will provide work note for Tues-Fri of this week  Instructed to call back Monday if no better for an Abx  Evaluated via telephone encounter lasting approx 15 minutes    Plan of care reviewed with the  patient at the conclusion of today's visit.  Education was provided regarding diagnosis, management, and any prescribed or recommended OTC medications.  Patient verbalized understanding of and agreement with management plan.     Return if symptoms worsen or fail to improve.     I spent 15 minutes in medical discussion with patient during this visit.     HOWIE Mckeon    Please note that portions of this note were completed with a voice recognition program. Efforts were made to edit the dictations, but occasionally words are mistranscribed.

## 2021-01-28 NOTE — PATIENT INSTRUCTIONS
Nausea and Vomiting, Adult  Nausea is the feeling that you have an upset stomach or that you are about to vomit. Vomiting is when stomach contents are thrown up and out of the mouth as a result of nausea. Vomiting can make you feel weak and cause you to become dehydrated.  Dehydration can make you feel tired and thirsty, cause you to have a dry mouth, and decrease how often you urinate. Older adults and people with other diseases or a weak disease-fighting system (immune system) are at higher risk for dehydration. It is important to treat your nausea and vomiting as told by your health care provider.  Follow these instructions at home:  Watch your symptoms for any changes. Tell your health care provider about them. Follow these instructions to care for yourself at home.  Eating and drinking         · Take an oral rehydration solution (ORS). This is a drink that is sold at pharmacies and retail stores.  · Drink clear fluids slowly and in small amounts as you are able. Clear fluids include water, ice chips, low-calorie sports drinks, and fruit juice that has water added (diluted fruit juice).  · Eat bland, easy-to-digest foods in small amounts as you are able. These foods include bananas, applesauce, rice, lean meats, toast, and crackers.  · Avoid fluids that contain a lot of sugar or caffeine, such as energy drinks, sports drinks, and soda.  · Avoid alcohol.  · Avoid spicy or fatty foods.  General instructions  · Take over-the-counter and prescription medicines only as told by your health care provider.  · Drink enough fluid to keep your urine pale yellow.  · Wash your hands often using soap and water. If soap and water are not available, use hand .  · Make sure that all people in your household wash their hands well and often.  · Rest at home while you recover.  · Watch your condition for any changes.  · Breathe slowly and deeply when you feel nauseated.  · Keep all follow-up visits as told by your health  care provider. This is important.  Contact a health care provider if:  · Your symptoms get worse.  · You have new symptoms.  · You have a fever.  · You cannot drink fluids without vomiting.  · Your nausea does not go away after 2 days.  · You feel light-headed or dizzy.  · You have a headache.  · You have muscle cramps.  · You have a rash.  · You have pain while urinating.  Get help right away if:  · You have pain in your chest, neck, arm, or jaw.  · You feel extremely weak or you faint.  · You have persistent vomiting.  · You have vomit that is bright red or looks like black coffee grounds.  · You have bloody or black stools or stools that look like tar.  · You have a severe headache, a stiff neck, or both.  · You have severe pain, cramping, or bloating in your abdomen.  · You have difficulty breathing, or you are breathing very quickly.  · Your heart is beating very quickly.  · Your skin feels cold and clammy.  · You feel confused.  · You have signs of dehydration, such as:  ? Dark urine, very little urine, or no urine.  ? Cracked lips.  ? Dry mouth.  ? Sunken eyes.  ? Sleepiness.  ? Weakness.  These symptoms may represent a serious problem that is an emergency. Do not wait to see if the symptoms will go away. Get medical help right away. Call your local emergency services (911 in the [place] Do not drive yourself to the hospital.  Summary  · Nausea is the feeling that you have an upset stomach or that you are about to vomit. As nausea gets worse, it can lead to vomiting. Vomiting can make you feel weak and cause you to become dehydrated.  · Follow instructions from your health care provider about eating and drinking to prevent dehydration.  · Take over-the-counter and prescription medicines only as told by your health care provider.  · Contact your health care provider if your symptoms get worse, or you have new symptoms.  · Keep all follow-up visits as told by your health care provider. This is important.  This  information is not intended to replace advice given to you by your health care provider. Make sure you discuss any questions you have with your health care provider.  Document Revised: 04/10/2020 Document Reviewed: 05/28/2019  Elsevier Patient Education © 2020 [employer]

## 2021-02-05 ENCOUNTER — TELEPHONE (OUTPATIENT)
Dept: INTERNAL MEDICINE | Facility: CLINIC | Age: 61
End: 2021-02-05

## 2021-02-05 ENCOUNTER — OFFICE VISIT (OUTPATIENT)
Dept: INTERNAL MEDICINE | Facility: CLINIC | Age: 61
End: 2021-02-05

## 2021-02-05 DIAGNOSIS — Z13.29 THYROID DISORDER SCREENING: ICD-10-CM

## 2021-02-05 DIAGNOSIS — I10 ESSENTIAL HYPERTENSION: ICD-10-CM

## 2021-02-05 DIAGNOSIS — E11.65 TYPE 2 DIABETES MELLITUS WITH HYPERGLYCEMIA, WITH LONG-TERM CURRENT USE OF INSULIN (HCC): Primary | ICD-10-CM

## 2021-02-05 DIAGNOSIS — Z13.21 ENCOUNTER FOR VITAMIN DEFICIENCY SCREENING: ICD-10-CM

## 2021-02-05 DIAGNOSIS — R30.0 DYSURIA: ICD-10-CM

## 2021-02-05 DIAGNOSIS — Z79.4 TYPE 2 DIABETES MELLITUS WITH HYPERGLYCEMIA, WITH LONG-TERM CURRENT USE OF INSULIN (HCC): Primary | ICD-10-CM

## 2021-02-05 DIAGNOSIS — E78.2 MIXED HYPERLIPIDEMIA: ICD-10-CM

## 2021-02-05 PROCEDURE — 99443 PR PHYS/QHP TELEPHONE EVALUATION 21-30 MIN: CPT | Performed by: NURSE PRACTITIONER

## 2021-02-05 NOTE — PROGRESS NOTES
Subjective   Chief Complaint   Patient presents with   • Diabetes   • Hypertension   • Hyperlipidemia      This is Telephone visit.  You have chosen to receive care through a telephone visit today. Do you consent to use a telephone visit for your medical care today? Yes    Thelma Michael is a 60 y.o. female here today for hypertension, diabetes, and hyperlipidemia.  Blood pressure has been stable and at goal.  Blood sugars have been running well and averaging around 130.  She is following a heart healthy low-cholesterol and diabetic diet.  She is trying to be physically active but continues to have low back pain and neuropathy.  She is recently been experiencing some nausea, diarrhea, body aches, and change in taste.  She was tested for Covid and negative.  No fevers or loss of taste and smell.  She is feeling some better.  She is having some dysuria and urinary frequency.  She is willing to come by the office for fasting labs.       I have reviewed the following portions of the patient's history and confirmed they are accurate: allergies, current medications, past family history, past medical history, past social history, past surgical history and problem list    I have personally completed the patient's review of systems.    Review of Systems   Constitutional: Positive for fatigue. Negative for activity change, appetite change, chills, diaphoresis, fever, unexpected weight gain and unexpected weight loss.   HENT: Negative for ear discharge, ear pain, mouth sores, nosebleeds, sinus pressure, sneezing and sore throat.    Eyes: Negative for pain, discharge and itching.   Respiratory: Negative for cough, chest tightness, shortness of breath and wheezing.    Cardiovascular: Negative for chest pain and palpitations.   Gastrointestinal: Positive for diarrhea and nausea. Negative for abdominal pain, vomiting, GERD and indigestion.   Endocrine: Negative for heat intolerance, polydipsia and polyphagia.    Genitourinary: Negative for dysuria, flank pain, frequency, hematuria, pelvic pain, pelvic pressure and urgency.   Musculoskeletal: Positive for arthralgias, back pain and myalgias. Negative for gait problem, joint swelling, neck pain and neck stiffness.   Skin: Positive for skin lesions. Negative for color change, pallor and rash.   Allergic/Immunologic: Negative for immunocompromised state.   Neurological: Positive for numbness and memory problem. Negative for dizziness, seizures, speech difficulty, light-headedness, headache and confusion.   Hematological: Negative for adenopathy. Bruises/bleeds easily.   Psychiatric/Behavioral: Positive for sleep disturbance and stress. Negative for agitation, decreased concentration, dysphoric mood and depressed mood. The patient is nervous/anxious.        Current Outpatient Medications on File Prior to Visit   Medication Sig   • albuterol sulfate  (90 Base) MCG/ACT inhaler Inhale 2 puffs Every 4 (Four) Hours As Needed for Wheezing.   • amLODIPine (NORVASC) 10 MG tablet Take 1 tablet by mouth Daily.   • Ascorbic Acid (VITAMIN C PO) Vitamin C TABS   • aspirin 81 MG tablet Take 1 tablet by mouth Daily.   • atorvastatin (LIPITOR) 80 MG tablet Take 1 tablet by mouth Every Night.   • Blood Glucose Monitoring Suppl (FREESTYLE FREEDOM LITE) w/Device kit 1 kit 3 (Three) Times a Day. TEST; For: Diabetic hypoglycemia, Diabetic peripheral neuropathy   • dicyclomine (Bentyl) 20 MG tablet Take 1 tablet by mouth Every 6 (Six) Hours As Needed (diarrhea).   • doxazosin (CARDURA) 1 MG tablet Take 1 tablet by mouth Every Night.   • ferrous sulfate 325 (65 FE) MG tablet Take 1 tablet by mouth Daily With Breakfast. Take with orange juice or vitamin C   • gabapentin (NEURONTIN) 400 MG capsule Take 1 capsule by mouth 3 (Three) Times a Day.   • glucagon (GLUCAGON EMERGENCY) 1 MG injection Use as directed; For: Diabetics mellitus   • glucose blood (Glucose Meter Test) test strip Use as  instructed to check blood glucose levels 3 times daily   • glucose monitor monitoring kit 1 each As Needed (Check blood sugars 3 times daily.).   • hydroCHLOROthiazide (MICROZIDE) 12.5 MG capsule Take 1 capsule by mouth Daily.   • Insulin Glargine (BASAGLAR KWIKPEN) 100 UNIT/ML injection pen Inject 35 Units under the skin into the appropriate area as directed Every Night.   • Insulin Lispro (ADMELOG SOLOSTAR) 100 UNIT/ML injection pen Inject 0-14 units under skin into appropriate area based upon sliding scale 3 times daily with meals as needed. Do not exceed 40 units per day.   • Insulin Pen Needle (BD Pen Needle Cydney U/F) 32G X 4 MM misc Take 1 each by mouth 4 (Four) Times a Day.   • Lancets misc Use as instructed to test blood glucose levels 3 times daily.   • meclizine (ANTIVERT) 25 MG tablet Take 1/2-1 tablet by mouth 3 times daily as needed for dizziness.   • nicotine (Nicotine Step 2) 14 MG/24HR patch Place 1 patch on the skin as directed by provider Daily. as directed; For: Tobacco abuse counseling   • ondansetron (ZOFRAN) 4 MG tablet Take 1 tablet by mouth Every 6 (Six) Hours As Needed for Nausea or Vomiting.   • pantoprazole (PROTONIX) 40 MG EC tablet Take 1 tablet by mouth Daily.   • sennosides-docusate (senna-docusate sodium) 8.6-50 MG per tablet Take 2 tablets by mouth 2 (Two) Times a Day As Needed for Constipation.     No current facility-administered medications on file prior to visit.        Objective   There were no vitals filed for this visit.  There is no height or weight on file to calculate BMI.    Physical Exam  Pulmonary:      Effort: No respiratory distress.   Neurological:      Mental Status: She is alert and oriented to person, place, and time.   Psychiatric:         Attention and Perception: Attention normal.         Mood and Affect: Mood normal.         Speech: Speech normal.         Behavior: Behavior is cooperative.         Thought Content: Thought content normal.         Assessment/Plan    Problem List Items Addressed This Visit        Cardiac and Vasculature    Hyperlipidemia  Chronic unstable requiring medication management, lifestyle changes, and monitoring. Discussed how this being unstable increases risk of cardiovascular disease and adverse events. Will follow a hearth healthy diet low in cholesterol and fat. Discussed increasing fiber intake. Suggested fish oil or omega 3 supplement of 1200mg twice daily. Educated on how these help lower triglycerides and LDL. Will drink adequate water and increase physical activity as tolerated. Discussed importance of medication compliance.   Continue lipitor.  Stressed importance of getting fasting labs completed.    Relevant Orders    Lipid Panel    Hypertension    Overview     Chronic issue stable requiring medication management and monitoring. Will eat well balanced heart healthy diet, drink adequate water, increase physical activity, and get adequate rest. Monitor blood pressures daily and contact office for any readings consistently above 140/90. Patient will report any associated symptoms such as headaches, blurry vision, or nausea. Patient will go to ER for any chest pressure or chest pain.   Continue lisinopril, doxazosin, and amlodipine.          Relevant Orders    CBC (No Diff)    Comprehensive Metabolic Panel       Endocrine and Metabolic    Type 2 diabetes mellitus with hyperglycemia (CMS/HCC) - Primary    Overview     Chronic issue stable requiring medication management and monitoring. Will eat well balanced heart healthy diabetic diet, drink adequate water, increase physical activity, and get adequate rest. Will monitor blood sugars daily and keep log for next appt. Will contact office earlier if blood sugars are averaging above 250.   Continue basaglar and admelog. Continue gabapentin for neuropathy.          Relevant Orders    CBC (No Diff)    Comprehensive Metabolic Panel    Hemoglobin A1c      Other Visit Diagnoses     Dysuria         Relevant Orders    Urinalysis With Culture If Indicated -    Encounter for vitamin deficiency screening        Relevant Orders    Vitamin D 25 Hydroxy    Vitamin B12 & Folate    Thyroid disorder screening        Relevant Orders    TSH Rfx On Abnormal To Free T4             Current Outpatient Medications:   •  albuterol sulfate  (90 Base) MCG/ACT inhaler, Inhale 2 puffs Every 4 (Four) Hours As Needed for Wheezing., Disp: 18 g, Rfl: 5  •  amLODIPine (NORVASC) 10 MG tablet, Take 1 tablet by mouth Daily., Disp: 30 tablet, Rfl: 5  •  Ascorbic Acid (VITAMIN C PO), Vitamin C TABS, Disp: , Rfl:   •  aspirin 81 MG tablet, Take 1 tablet by mouth Daily., Disp: 30 tablet, Rfl: 0  •  atorvastatin (LIPITOR) 80 MG tablet, Take 1 tablet by mouth Every Night., Disp: 30 tablet, Rfl: 5  •  Blood Glucose Monitoring Suppl (FREESTYLE FREEDOM LITE) w/Device kit, 1 kit 3 (Three) Times a Day. TEST; For: Diabetic hypoglycemia, Diabetic peripheral neuropathy, Disp: 1 each, Rfl: 0  •  dicyclomine (Bentyl) 20 MG tablet, Take 1 tablet by mouth Every 6 (Six) Hours As Needed (diarrhea)., Disp: 60 tablet, Rfl: 5  •  doxazosin (CARDURA) 1 MG tablet, Take 1 tablet by mouth Every Night., Disp: 30 tablet, Rfl: 5  •  ferrous sulfate 325 (65 FE) MG tablet, Take 1 tablet by mouth Daily With Breakfast. Take with orange juice or vitamin C, Disp: 30 tablet, Rfl: 2  •  FLUoxetine (PROzac) 20 MG capsule, Take 1 capsule by mouth Daily., Disp: 30 capsule, Rfl: 1  •  gabapentin (NEURONTIN) 400 MG capsule, Take 1 capsule by mouth 3 (Three) Times a Day., Disp: 90 capsule, Rfl: 2  •  glucagon (GLUCAGON EMERGENCY) 1 MG injection, Use as directed; For: Diabetics mellitus, Disp: , Rfl:   •  glucose blood (Glucose Meter Test) test strip, Use as instructed to check blood glucose levels 3 times daily, Disp: 100 each, Rfl: 5  •  glucose monitor monitoring kit, 1 each As Needed (Check blood sugars 3 times daily.)., Disp: 1 each, Rfl: 0  •  hydroCHLOROthiazide  (MICROZIDE) 12.5 MG capsule, Take 1 capsule by mouth Daily., Disp: 30 capsule, Rfl: 2  •  hydrocortisone 1 % cream, Apply  topically to the appropriate area as directed 2 (Two) Times a Day., Disp: 30 g, Rfl: 1  •  Insulin Glargine (BASAGLAR KWIKPEN) 100 UNIT/ML injection pen, Inject 35 Units under the skin into the appropriate area as directed Every Night., Disp: 5 pen, Rfl: 2  •  Insulin Lispro (ADMELOG SOLOSTAR) 100 UNIT/ML injection pen, Inject 0-14 units under skin into appropriate area based upon sliding scale 3 times daily with meals as needed. Do not exceed 40 units per day., Disp: 5 pen, Rfl: 5  •  Insulin Pen Needle (BD Pen Needle Cydney U/F) 32G X 4 MM misc, Take 1 each by mouth 4 (Four) Times a Day., Disp: 100 each, Rfl: 5  •  Lancets misc, Use as instructed to test blood glucose levels 3 times daily., Disp: 100 each, Rfl: 5  •  lisinopril (PRINIVIL,ZESTRIL) 40 MG tablet, Take 1 tablet by mouth Daily., Disp: 30 tablet, Rfl: 5  •  meclizine (ANTIVERT) 25 MG tablet, Take 1/2-1 tablet by mouth 3 times daily as needed for dizziness., Disp: 60 tablet, Rfl: 1  •  nicotine (Nicotine Step 2) 14 MG/24HR patch, Place 1 patch on the skin as directed by provider Daily. as directed; For: Tobacco abuse counseling, Disp: 28 patch, Rfl: 0  •  ondansetron (ZOFRAN) 4 MG tablet, Take 1 tablet by mouth Every 6 (Six) Hours As Needed for Nausea or Vomiting., Disp: 20 tablet, Rfl: 0  •  pantoprazole (PROTONIX) 40 MG EC tablet, Take 1 tablet by mouth Daily., Disp: 30 tablet, Rfl: 5  •  promethazine (PHENERGAN) 12.5 MG tablet, Take 1 tablet by mouth Every 6 (Six) Hours As Needed for Nausea or Vomiting., Disp: 20 tablet, Rfl: 0  •  sennosides-docusate (senna-docusate sodium) 8.6-50 MG per tablet, Take 2 tablets by mouth 2 (Two) Times a Day As Needed for Constipation., Disp: 60 tablet, Rfl: 5  •  traMADol (ULTRAM) 50 MG tablet, Take 1 tablet by mouth 2 (Two) Times a Day As Needed for Moderate Pain ., Disp: 28 tablet, Rfl: 0  •   traZODone (DESYREL) 50 MG tablet, Take 1-2 tablets one hour before bedtime as needed for sleep., Disp: 45 tablet, Rfl: 1  •  vitamin D (ERGOCALCIFEROL) 1.25 MG (93741 UT) capsule capsule, Take 1 capsule by mouth 1 (One) Time Per Week., Disp: 4 capsule, Rfl: 2       Plan of care reviewed with the patient at the conclusion of today's visit.  Education was provided regarding diagnosis, management, and any prescribed or recommended OTC medications.  Patient verbalized understanding of and agreement with management plan.     Return in about 3 months (around 5/5/2021), or if symptoms worsen or fail to improve.     I spent 25 minutes in medical discussion with patient during this visit.     Monet Chau, HOWIE    Please note that portions of this note were completed with a voice recognition program. Efforts were made to edit the dictations, but occasionally words are mistranscribed.

## 2021-02-05 NOTE — TELEPHONE ENCOUNTER
PATIENT CALLED TO GIVE A FAX NUMBER FOR HER EMPLOYER.    [employer]  FAX# [phone]  ATTN: RAYNE    PATIENT'S CONTACT [phone]     PATIENT ASKED FOR THIS NUMBER TO STAY HER FILE SO THAT MARKY WILL HAVE ACCESS TO IT.

## 2021-02-12 DIAGNOSIS — F41.8 DEPRESSION WITH ANXIETY: ICD-10-CM

## 2021-02-12 DIAGNOSIS — G89.29 CHRONIC MIDLINE LOW BACK PAIN WITHOUT SCIATICA: ICD-10-CM

## 2021-02-12 DIAGNOSIS — M54.50 CHRONIC MIDLINE LOW BACK PAIN WITHOUT SCIATICA: ICD-10-CM

## 2021-02-12 DIAGNOSIS — F51.01 PRIMARY INSOMNIA: ICD-10-CM

## 2021-02-12 DIAGNOSIS — E11.42 DIABETIC PERIPHERAL NEUROPATHY (HCC): ICD-10-CM

## 2021-02-12 RX ORDER — LISINOPRIL 40 MG/1
40 TABLET ORAL DAILY
Qty: 30 TABLET | Refills: 5 | Status: SHIPPED | OUTPATIENT
Start: 2021-02-12 | End: 2021-03-31 | Stop reason: HOSPADM

## 2021-02-12 RX ORDER — TRAMADOL HYDROCHLORIDE 50 MG/1
50 TABLET ORAL 2 TIMES DAILY PRN
Qty: 28 TABLET | Refills: 0 | Status: SHIPPED | OUTPATIENT
Start: 2021-02-12 | End: 2021-04-02

## 2021-02-12 RX ORDER — PROMETHAZINE HYDROCHLORIDE 12.5 MG/1
12.5 TABLET ORAL EVERY 6 HOURS PRN
Qty: 20 TABLET | Refills: 0 | Status: SHIPPED | OUTPATIENT
Start: 2021-02-12 | End: 2021-07-03 | Stop reason: HOSPADM

## 2021-02-12 RX ORDER — ERGOCALCIFEROL 1.25 MG/1
50000 CAPSULE ORAL WEEKLY
Qty: 4 CAPSULE | Refills: 2 | Status: SHIPPED | OUTPATIENT
Start: 2021-02-12 | End: 2021-03-31 | Stop reason: HOSPADM

## 2021-02-12 RX ORDER — TRAZODONE HYDROCHLORIDE 50 MG/1
TABLET ORAL
Qty: 45 TABLET | Refills: 1 | Status: SHIPPED | OUTPATIENT
Start: 2021-02-12 | End: 2021-03-24 | Stop reason: SDUPTHER

## 2021-02-12 RX ORDER — FLUOXETINE HYDROCHLORIDE 20 MG/1
20 CAPSULE ORAL DAILY
Qty: 30 CAPSULE | Refills: 1 | Status: SHIPPED | OUTPATIENT
Start: 2021-02-12 | End: 2021-10-25

## 2021-02-12 NOTE — TELEPHONE ENCOUNTER
Caller: AlecVivianaThelma Dell    Relationship: Self    Best call back number: [phone]    Medication needed:   Requested Prescriptions     Pending Prescriptions Disp Refills   • traMADol (ULTRAM) 50 MG tablet 28 tablet 0     Sig: Take 1 tablet by mouth 2 (Two) Times a Day As Needed for Moderate Pain .   • traZODone (DESYREL) 50 MG tablet 45 tablet 1     Sig: Take 1-2 tablets one hour before bedtime as needed for sleep.   • promethazine (PHENERGAN) 12.5 MG tablet 20 tablet 0     Sig: Take 1 tablet by mouth Every 6 (Six) Hours As Needed for Nausea or Vomiting.   • FLUoxetine (PROzac) 20 MG capsule 30 capsule 1     Sig: Take 1 capsule by mouth Daily.   • lisinopril (PRINIVIL,ZESTRIL) 40 MG tablet 30 tablet 5     Sig: Take 1 tablet by mouth Daily.   • vitamin D (ERGOCALCIFEROL) 1.25 MG (30763 UT) capsule capsule 4 capsule 2     Sig: Take 1 capsule by mouth 1 (One) Time Per Week.       When do you need the refill by: 02/13/21    What details did the patient provide when requesting the medication: patient has 2 days left    Does the patient have less than a 3 day supply:  [x] Yes  [] No    What is the patient's preferred pharmacy: [address] [phone] [facility] [phone]

## 2021-02-19 ENCOUNTER — TELEPHONE (OUTPATIENT)
Dept: INTERNAL MEDICINE | Facility: CLINIC | Age: 61
End: 2021-02-19

## 2021-02-19 NOTE — TELEPHONE ENCOUNTER
Caller: Thelma Michael    Relationship: Self    Best call back number: [phone]    What medication are you requesting: MEDICATION TO HELP    What are your current symptoms: HEMORRHOID ON THE OUTSIDE    How long have you been experiencing symptoms: 4 DAYS    Have you had these symptoms before:    [] Yes  [x] No    Have you been treated for these symptoms before:   [] Yes  [x] No    If a prescription is needed, what is your preferred pharmacy and phone number: [address] [phone] [facility] [phone]      Additional notes:

## 2021-02-20 RX ORDER — DIAPER,BRIEF,INFANT-TODD,DISP
EACH MISCELLANEOUS 2 TIMES DAILY
Qty: 30 G | Refills: 1 | Status: SHIPPED | OUTPATIENT
Start: 2021-02-20 | End: 2021-03-31 | Stop reason: HOSPADM

## 2021-03-09 RX ORDER — LANCETS 30 GAUGE
EACH MISCELLANEOUS
Qty: 100 EACH | Refills: 5 | Status: SHIPPED | OUTPATIENT
Start: 2021-03-09 | End: 2021-07-15

## 2021-03-09 NOTE — TELEPHONE ENCOUNTER
Caller: Thelma Michael    Relationship: Self    Best call back number: [phone]    Medication needed:   Requested Prescriptions     Pending Prescriptions Disp Refills   • Lancets misc 100 each 5     Sig: Use as instructed to test blood glucose levels 3 times daily.       When do you need the refill by: 3/9/21    What details did the patient provide when requesting the medication:     Does the patient have less than a 3 day supply:  [x] Yes  [] No    What is the patient's preferred pharmacy: [address] [phone] [facility] [phone]

## 2021-03-11 ENCOUNTER — TELEPHONE (OUTPATIENT)
Dept: INTERNAL MEDICINE | Facility: CLINIC | Age: 61
End: 2021-03-11

## 2021-03-11 ENCOUNTER — LAB (OUTPATIENT)
Dept: LAB | Facility: HOSPITAL | Age: 61
End: 2021-03-11

## 2021-03-11 DIAGNOSIS — E78.2 MIXED HYPERLIPIDEMIA: ICD-10-CM

## 2021-03-11 DIAGNOSIS — Z13.21 ENCOUNTER FOR VITAMIN DEFICIENCY SCREENING: ICD-10-CM

## 2021-03-11 DIAGNOSIS — E13.9 DIABETES 1.5, MANAGED AS TYPE 2 (HCC): ICD-10-CM

## 2021-03-11 DIAGNOSIS — R30.0 DYSURIA: ICD-10-CM

## 2021-03-11 DIAGNOSIS — I10 ESSENTIAL HYPERTENSION: ICD-10-CM

## 2021-03-11 DIAGNOSIS — Z79.4 TYPE 2 DIABETES MELLITUS WITH HYPERGLYCEMIA, WITH LONG-TERM CURRENT USE OF INSULIN (HCC): ICD-10-CM

## 2021-03-11 DIAGNOSIS — E11.65 TYPE 2 DIABETES MELLITUS WITH HYPERGLYCEMIA, WITH LONG-TERM CURRENT USE OF INSULIN (HCC): ICD-10-CM

## 2021-03-11 DIAGNOSIS — Z13.29 THYROID DISORDER SCREENING: ICD-10-CM

## 2021-03-11 LAB
ALBUMIN SERPL-MCNC: 3.4 G/DL (ref 3.5–5.2)
ALBUMIN/GLOB SERPL: 1.4 G/DL
ALP SERPL-CCNC: 89 U/L (ref 39–117)
ALT SERPL W P-5'-P-CCNC: 33 U/L (ref 1–33)
ANION GAP SERPL CALCULATED.3IONS-SCNC: 6.2 MMOL/L (ref 5–15)
AST SERPL-CCNC: 21 U/L (ref 1–32)
BACTERIA UR QL AUTO: ABNORMAL /HPF
BILIRUB SERPL-MCNC: 0.2 MG/DL (ref 0–1.2)
BILIRUB UR QL STRIP: NEGATIVE
BUN SERPL-MCNC: 12 MG/DL (ref 8–23)
BUN/CREAT SERPL: 14.5 (ref 7–25)
CALCIUM SPEC-SCNC: 8.7 MG/DL (ref 8.6–10.5)
CHLORIDE SERPL-SCNC: 106 MMOL/L (ref 98–107)
CHOLEST SERPL-MCNC: 140 MG/DL (ref 0–200)
CLARITY UR: ABNORMAL
CO2 SERPL-SCNC: 31.8 MMOL/L (ref 22–29)
COLOR UR: YELLOW
CREAT SERPL-MCNC: 0.83 MG/DL (ref 0.57–1)
DEPRECATED RDW RBC AUTO: 41.6 FL (ref 37–54)
ERYTHROCYTE [DISTWIDTH] IN BLOOD BY AUTOMATED COUNT: 12.5 % (ref 12.3–15.4)
GFR SERPL CREATININE-BSD FRML MDRD: 84 ML/MIN/1.73
GLOBULIN UR ELPH-MCNC: 2.4 GM/DL
GLUCOSE SERPL-MCNC: 64 MG/DL (ref 65–99)
GLUCOSE UR STRIP-MCNC: ABNORMAL MG/DL
HBA1C MFR BLD: 13.6 % (ref 4.8–5.6)
HCT VFR BLD AUTO: 35.3 % (ref 34–46.6)
HDLC SERPL-MCNC: 68 MG/DL (ref 40–60)
HGB BLD-MCNC: 11.8 G/DL (ref 12–15.9)
HGB UR QL STRIP.AUTO: ABNORMAL
HYALINE CASTS UR QL AUTO: ABNORMAL /LPF
KETONES UR QL STRIP: NEGATIVE
LDLC SERPL CALC-MCNC: 63 MG/DL (ref 0–100)
LDLC/HDLC SERPL: 0.96 {RATIO}
LEUKOCYTE ESTERASE UR QL STRIP.AUTO: ABNORMAL
MCH RBC QN AUTO: 30.7 PG (ref 26.6–33)
MCHC RBC AUTO-ENTMCNC: 33.4 G/DL (ref 31.5–35.7)
MCV RBC AUTO: 91.9 FL (ref 79–97)
NITRITE UR QL STRIP: POSITIVE
PH UR STRIP.AUTO: 5.5 [PH] (ref 5–8)
PLATELET # BLD AUTO: 260 10*3/MM3 (ref 140–450)
PMV BLD AUTO: 11.2 FL (ref 6–12)
POTASSIUM SERPL-SCNC: 4.5 MMOL/L (ref 3.5–5.2)
PROT SERPL-MCNC: 5.8 G/DL (ref 6–8.5)
PROT UR QL STRIP: ABNORMAL
RBC # BLD AUTO: 3.84 10*6/MM3 (ref 3.77–5.28)
RBC # UR: ABNORMAL /HPF
REF LAB TEST METHOD: ABNORMAL
SODIUM SERPL-SCNC: 144 MMOL/L (ref 136–145)
SP GR UR STRIP: 1.02 (ref 1–1.03)
SQUAMOUS #/AREA URNS HPF: ABNORMAL /HPF
TRIGL SERPL-MCNC: 33 MG/DL (ref 0–150)
TSH SERPL DL<=0.05 MIU/L-ACNC: 1.19 UIU/ML (ref 0.27–4.2)
UROBILINOGEN UR QL STRIP: ABNORMAL
VLDLC SERPL-MCNC: 9 MG/DL (ref 5–40)
WBC # BLD AUTO: 4.53 10*3/MM3 (ref 3.4–10.8)
WBC UR QL AUTO: ABNORMAL /HPF

## 2021-03-11 PROCEDURE — 82607 VITAMIN B-12: CPT

## 2021-03-11 PROCEDURE — 87088 URINE BACTERIA CULTURE: CPT

## 2021-03-11 PROCEDURE — 80061 LIPID PANEL: CPT

## 2021-03-11 PROCEDURE — 82746 ASSAY OF FOLIC ACID SERUM: CPT

## 2021-03-11 PROCEDURE — 87186 SC STD MICRODIL/AGAR DIL: CPT

## 2021-03-11 PROCEDURE — 82306 VITAMIN D 25 HYDROXY: CPT

## 2021-03-11 PROCEDURE — 87086 URINE CULTURE/COLONY COUNT: CPT

## 2021-03-11 PROCEDURE — 83036 HEMOGLOBIN GLYCOSYLATED A1C: CPT

## 2021-03-11 PROCEDURE — 81001 URINALYSIS AUTO W/SCOPE: CPT

## 2021-03-11 PROCEDURE — 80050 GENERAL HEALTH PANEL: CPT

## 2021-03-11 RX ORDER — PEN NEEDLE, DIABETIC 32GX 5/32"
1 NEEDLE, DISPOSABLE MISCELLANEOUS 4 TIMES DAILY
Qty: 100 EACH | Refills: 5 | Status: ON HOLD | OUTPATIENT
Start: 2021-03-11 | End: 2022-12-12

## 2021-03-11 NOTE — TELEPHONE ENCOUNTER
Caller: Thelma Michael    Relationship: Self    Best call back number: [phone]    Medication needed:   Requested Prescriptions     Pending Prescriptions Disp Refills   • Insulin Pen Needle (BD Pen Needle Cydney U/F) 32G X 4 MM misc 100 each 5     Sig: Take 1 each by mouth 4 (Four) Times a Day.       When do you need the refill by: ASAP    What details did the patient provide when requesting the medication: PATIENT HAS TWO DAYS LEFT    Does the patient have less than a 3 day supply:  [x] Yes  [] No    What is the patient's preferred pharmacy: [facility] [address] [place] [address] [phone] [facility] [phone]

## 2021-03-11 NOTE — TELEPHONE ENCOUNTER
PATIENT CALLED AND STATED THAT SUGAR HAS BEEN RUNNING:   TODAY 126   LAST NIGHT : 140  EARLIER : 104  93, 92, 101,     DOWN TO 44 THREE DAYS AGO, FIRST THING IN THE MORNING AND THEN UP TO 77 LATER ON THAT NIGHT .     AND WOULD LIKE TO KNOW IF NEEDS TO CONTINUE TAKING INSULIN ON SLIDING SCALE OR WHAT SHE SHOULD DO AND THE AMOUNTS SHE SHOULD TAKE.     PLEASE CALL AND ADVISE: [phone]

## 2021-03-12 LAB
25(OH)D3 SERPL-MCNC: 34 NG/ML
FOLATE SERPL-MCNC: 10.9 NG/ML (ref 4.78–24.2)
VIT B12 BLD-MCNC: 881 PG/ML (ref 211–946)

## 2021-03-14 LAB — BACTERIA SPEC AEROBE CULT: ABNORMAL

## 2021-03-15 DIAGNOSIS — E11.65 TYPE 2 DIABETES MELLITUS WITH HYPERGLYCEMIA, WITH LONG-TERM CURRENT USE OF INSULIN (HCC): Primary | ICD-10-CM

## 2021-03-15 DIAGNOSIS — Z79.4 TYPE 2 DIABETES MELLITUS WITH HYPERGLYCEMIA, WITH LONG-TERM CURRENT USE OF INSULIN (HCC): Primary | ICD-10-CM

## 2021-03-15 RX ORDER — NITROFURANTOIN 25; 75 MG/1; MG/1
100 CAPSULE ORAL EVERY 12 HOURS SCHEDULED
Qty: 14 CAPSULE | Refills: 0 | Status: SHIPPED | OUTPATIENT
Start: 2021-03-15 | End: 2021-03-22

## 2021-03-15 NOTE — PROGRESS NOTES
My chart message:    Urine test shows UTI. I am calling in antibiotic to the pharmacy. Labs show blood sugars are running high and averaging around 350. Due to her blood sugars being so unstable I am referring her to endocrinology for consult. You will get a call with appt. You are back to being anemic. Follow a high iron diet and continue iron supplement.  I want to see you in one month to repeat labs.

## 2021-03-24 DIAGNOSIS — F51.01 PRIMARY INSOMNIA: ICD-10-CM

## 2021-03-24 RX ORDER — TRAZODONE HYDROCHLORIDE 50 MG/1
TABLET ORAL
Qty: 45 TABLET | Refills: 1 | Status: SHIPPED | OUTPATIENT
Start: 2021-03-24 | End: 2021-05-06 | Stop reason: HOSPADM

## 2021-03-24 NOTE — TELEPHONE ENCOUNTER
Caller: Thelma Michael    Relationship: Self    Best call back number: [phone]    Medication needed:   Requested Prescriptions     Pending Prescriptions Disp Refills   • traZODone (DESYREL) 50 MG tablet 45 tablet 1     Sig: Take 1-2 tablets one hour before bedtime as needed for sleep.       When do you need the refill by: 03/24/2021    What additional details did the patient provide when requesting the medication: PATIENT ONLY HAS 3 PILLS LEFT OF THE MEDICATION     Does the patient have less than a 3 day supply:  [] Yes  [x] No    What is the patient's preferred pharmacy: [facility] [address] [place] [address] [phone] [facility] [phone]

## 2021-03-29 ENCOUNTER — APPOINTMENT (OUTPATIENT)
Dept: CT IMAGING | Facility: HOSPITAL | Age: 61
End: 2021-03-29

## 2021-03-29 ENCOUNTER — HOSPITAL ENCOUNTER (INPATIENT)
Facility: HOSPITAL | Age: 61
LOS: 1 days | Discharge: HOME | End: 2021-03-31
Attending: EMERGENCY MEDICINE | Admitting: HOSPITALIST

## 2021-03-29 DIAGNOSIS — R11.2 NON-INTRACTABLE VOMITING WITH NAUSEA, UNSPECIFIED VOMITING TYPE: ICD-10-CM

## 2021-03-29 DIAGNOSIS — N39.0 ACUTE UTI: Primary | ICD-10-CM

## 2021-03-29 DIAGNOSIS — E11.10 LACTIC ACIDOSIS DUE TO DIABETES MELLITUS (HCC): ICD-10-CM

## 2021-03-29 DIAGNOSIS — F41.8 DEPRESSION WITH ANXIETY: ICD-10-CM

## 2021-03-29 DIAGNOSIS — R10.84 GENERALIZED ABDOMINAL PAIN: ICD-10-CM

## 2021-03-29 LAB
ALBUMIN SERPL-MCNC: 4.4 G/DL (ref 3.5–5.2)
ALBUMIN/GLOB SERPL: 1.4 G/DL
ALP SERPL-CCNC: 121 U/L (ref 39–117)
ALT SERPL W P-5'-P-CCNC: 23 U/L (ref 1–33)
ANION GAP SERPL CALCULATED.3IONS-SCNC: 18 MMOL/L (ref 5–15)
AST SERPL-CCNC: 28 U/L (ref 1–32)
BACTERIA UR QL AUTO: ABNORMAL /HPF
BASOPHILS # BLD AUTO: 0.04 10*3/MM3 (ref 0–0.2)
BASOPHILS NFR BLD AUTO: 0.4 % (ref 0–1.5)
BILIRUB SERPL-MCNC: 0.6 MG/DL (ref 0–1.2)
BILIRUB UR QL STRIP: NEGATIVE
BUN SERPL-MCNC: 11 MG/DL (ref 8–23)
BUN/CREAT SERPL: 12.4 (ref 7–25)
CALCIUM SPEC-SCNC: 9.7 MG/DL (ref 8.6–10.5)
CHLORIDE SERPL-SCNC: 100 MMOL/L (ref 98–107)
CLARITY UR: ABNORMAL
CO2 SERPL-SCNC: 21 MMOL/L (ref 22–29)
COLOR UR: ABNORMAL
CREAT SERPL-MCNC: 0.89 MG/DL (ref 0.57–1)
D-LACTATE SERPL-SCNC: 3.6 MMOL/L (ref 0.5–2)
DEPRECATED RDW RBC AUTO: 41.4 FL (ref 37–54)
EOSINOPHIL # BLD AUTO: 0.05 10*3/MM3 (ref 0–0.4)
EOSINOPHIL NFR BLD AUTO: 0.5 % (ref 0.3–6.2)
ERYTHROCYTE [DISTWIDTH] IN BLOOD BY AUTOMATED COUNT: 12.6 % (ref 12.3–15.4)
GFR SERPL CREATININE-BSD FRML MDRD: 78 ML/MIN/1.73
GLOBULIN UR ELPH-MCNC: 3.1 GM/DL
GLUCOSE SERPL-MCNC: 287 MG/DL (ref 65–99)
GLUCOSE UR STRIP-MCNC: ABNORMAL MG/DL
HCT VFR BLD AUTO: 41.4 % (ref 34–46.6)
HGB BLD-MCNC: 14 G/DL (ref 12–15.9)
HGB UR QL STRIP.AUTO: ABNORMAL
HOLD SPECIMEN: NORMAL
HYALINE CASTS UR QL AUTO: ABNORMAL /LPF
IMM GRANULOCYTES # BLD AUTO: 0.07 10*3/MM3 (ref 0–0.05)
IMM GRANULOCYTES NFR BLD AUTO: 0.7 % (ref 0–0.5)
KETONES UR QL STRIP: ABNORMAL
LEUKOCYTE ESTERASE UR QL STRIP.AUTO: ABNORMAL
LIPASE SERPL-CCNC: 27 U/L (ref 13–60)
LYMPHOCYTES # BLD AUTO: 1.79 10*3/MM3 (ref 0.7–3.1)
LYMPHOCYTES NFR BLD AUTO: 16.7 % (ref 19.6–45.3)
MCH RBC QN AUTO: 30.2 PG (ref 26.6–33)
MCHC RBC AUTO-ENTMCNC: 33.8 G/DL (ref 31.5–35.7)
MCV RBC AUTO: 89.2 FL (ref 79–97)
MONOCYTES # BLD AUTO: 0.5 10*3/MM3 (ref 0.1–0.9)
MONOCYTES NFR BLD AUTO: 4.7 % (ref 5–12)
NEUTROPHILS NFR BLD AUTO: 77 % (ref 42.7–76)
NEUTROPHILS NFR BLD AUTO: 8.26 10*3/MM3 (ref 1.7–7)
NITRITE UR QL STRIP: NEGATIVE
NRBC BLD AUTO-RTO: 0 /100 WBC (ref 0–0.2)
PH UR STRIP.AUTO: 8 [PH] (ref 5–8)
PLATELET # BLD AUTO: 363 10*3/MM3 (ref 140–450)
PMV BLD AUTO: 10.3 FL (ref 6–12)
POTASSIUM SERPL-SCNC: 4.2 MMOL/L (ref 3.5–5.2)
PROT SERPL-MCNC: 7.5 G/DL (ref 6–8.5)
PROT UR QL STRIP: ABNORMAL
RBC # BLD AUTO: 4.64 10*6/MM3 (ref 3.77–5.28)
RBC # UR: ABNORMAL /HPF
REF LAB TEST METHOD: ABNORMAL
SARS-COV-2 RNA RESP QL NAA+PROBE: NOT DETECTED
SODIUM SERPL-SCNC: 139 MMOL/L (ref 136–145)
SP GR UR STRIP: 1.02 (ref 1–1.03)
SQUAMOUS #/AREA URNS HPF: ABNORMAL /HPF
UROBILINOGEN UR QL STRIP: ABNORMAL
WBC # BLD AUTO: 10.71 10*3/MM3 (ref 3.4–10.8)
WBC UR QL AUTO: ABNORMAL /HPF
WHOLE BLOOD HOLD SPECIMEN: NORMAL
WHOLE BLOOD HOLD SPECIMEN: NORMAL

## 2021-03-29 PROCEDURE — 87077 CULTURE AEROBIC IDENTIFY: CPT | Performed by: INTERNAL MEDICINE

## 2021-03-29 PROCEDURE — 83735 ASSAY OF MAGNESIUM: CPT | Performed by: INTERNAL MEDICINE

## 2021-03-29 PROCEDURE — 83036 HEMOGLOBIN GLYCOSYLATED A1C: CPT | Performed by: INTERNAL MEDICINE

## 2021-03-29 PROCEDURE — 87086 URINE CULTURE/COLONY COUNT: CPT | Performed by: INTERNAL MEDICINE

## 2021-03-29 PROCEDURE — 83605 ASSAY OF LACTIC ACID: CPT

## 2021-03-29 PROCEDURE — 74176 CT ABD & PELVIS W/O CONTRAST: CPT

## 2021-03-29 PROCEDURE — 25010000002 CEFTRIAXONE PER 250 MG: Performed by: PHYSICIAN ASSISTANT

## 2021-03-29 PROCEDURE — 63710000001 PROMETHAZINE PER 25 MG: Performed by: PHYSICIAN ASSISTANT

## 2021-03-29 PROCEDURE — 25010000002 METOCLOPRAMIDE PER 10 MG: Performed by: PHYSICIAN ASSISTANT

## 2021-03-29 PROCEDURE — 81001 URINALYSIS AUTO W/SCOPE: CPT

## 2021-03-29 PROCEDURE — 83605 ASSAY OF LACTIC ACID: CPT | Performed by: EMERGENCY MEDICINE

## 2021-03-29 PROCEDURE — U0003 INFECTIOUS AGENT DETECTION BY NUCLEIC ACID (DNA OR RNA); SEVERE ACUTE RESPIRATORY SYNDROME CORONAVIRUS 2 (SARS-COV-2) (CORONAVIRUS DISEASE [COVID-19]), AMPLIFIED PROBE TECHNIQUE, MAKING USE OF HIGH THROUGHPUT TECHNOLOGIES AS DESCRIBED BY CMS-2020-01-R: HCPCS | Performed by: PHYSICIAN ASSISTANT

## 2021-03-29 PROCEDURE — P9612 CATHETERIZE FOR URINE SPEC: HCPCS

## 2021-03-29 PROCEDURE — 83690 ASSAY OF LIPASE: CPT

## 2021-03-29 PROCEDURE — 99285 EMERGENCY DEPT VISIT HI MDM: CPT

## 2021-03-29 PROCEDURE — 80050 GENERAL HEALTH PANEL: CPT

## 2021-03-29 PROCEDURE — 87186 SC STD MICRODIL/AGAR DIL: CPT | Performed by: INTERNAL MEDICINE

## 2021-03-29 RX ORDER — METOCLOPRAMIDE HYDROCHLORIDE 5 MG/ML
10 INJECTION INTRAMUSCULAR; INTRAVENOUS ONCE
Status: COMPLETED | OUTPATIENT
Start: 2021-03-29 | End: 2021-03-29

## 2021-03-29 RX ORDER — PROMETHAZINE HYDROCHLORIDE 25 MG/1
25 TABLET ORAL ONCE
Status: COMPLETED | OUTPATIENT
Start: 2021-03-29 | End: 2021-03-29

## 2021-03-29 RX ORDER — SODIUM CHLORIDE 9 MG/ML
10 INJECTION INTRAVENOUS AS NEEDED
Status: DISCONTINUED | OUTPATIENT
Start: 2021-03-29 | End: 2021-03-31 | Stop reason: HOSPADM

## 2021-03-29 RX ADMIN — SODIUM CHLORIDE 1 G: 900 INJECTION INTRAVENOUS at 23:31

## 2021-03-29 RX ADMIN — SODIUM CHLORIDE 1000 ML: 9 INJECTION, SOLUTION INTRAVENOUS at 21:47

## 2021-03-29 RX ADMIN — PROMETHAZINE HYDROCHLORIDE 25 MG: 25 TABLET ORAL at 23:31

## 2021-03-29 RX ADMIN — METOCLOPRAMIDE 10 MG: 5 INJECTION, SOLUTION INTRAMUSCULAR; INTRAVENOUS at 21:47

## 2021-03-30 PROBLEM — N39.0 ACUTE UTI: Status: ACTIVE | Noted: 2021-03-30

## 2021-03-30 LAB
BACTERIA UR QL AUTO: ABNORMAL /HPF
BILIRUB UR QL STRIP: NEGATIVE
CLARITY UR: ABNORMAL
COLOR UR: YELLOW
D-LACTATE SERPL-SCNC: 2.8 MMOL/L (ref 0.5–2)
GLUCOSE BLDC GLUCOMTR-MCNC: 127 MG/DL (ref 70–130)
GLUCOSE BLDC GLUCOMTR-MCNC: 159 MG/DL (ref 70–130)
GLUCOSE BLDC GLUCOMTR-MCNC: 172 MG/DL (ref 70–130)
GLUCOSE BLDC GLUCOMTR-MCNC: 294 MG/DL (ref 70–130)
GLUCOSE UR STRIP-MCNC: ABNORMAL MG/DL
HBA1C MFR BLD: 12.8 % (ref 4.8–5.6)
HGB UR QL STRIP.AUTO: ABNORMAL
HYALINE CASTS UR QL AUTO: ABNORMAL /LPF
KETONES UR QL STRIP: ABNORMAL
LEUKOCYTE ESTERASE UR QL STRIP.AUTO: NEGATIVE
MAGNESIUM SERPL-MCNC: 1.8 MG/DL (ref 1.6–2.4)
NITRITE UR QL STRIP: NEGATIVE
PH UR STRIP.AUTO: 6 [PH] (ref 5–8)
PROT UR QL STRIP: ABNORMAL
RBC # UR: ABNORMAL /HPF
REF LAB TEST METHOD: ABNORMAL
SP GR UR STRIP: 1.02 (ref 1–1.03)
SQUAMOUS #/AREA URNS HPF: ABNORMAL /HPF
TSH SERPL DL<=0.05 MIU/L-ACNC: 2.68 UIU/ML (ref 0.27–4.2)
UROBILINOGEN UR QL STRIP: ABNORMAL
WBC UR QL AUTO: ABNORMAL /HPF
YEAST URNS QL MICRO: ABNORMAL /HPF

## 2021-03-30 PROCEDURE — 99223 1ST HOSP IP/OBS HIGH 75: CPT | Performed by: INTERNAL MEDICINE

## 2021-03-30 PROCEDURE — 81001 URINALYSIS AUTO W/SCOPE: CPT | Performed by: INTERNAL MEDICINE

## 2021-03-30 PROCEDURE — 63710000001 INSULIN LISPRO (HUMAN) PER 5 UNITS: Performed by: INTERNAL MEDICINE

## 2021-03-30 PROCEDURE — 82962 GLUCOSE BLOOD TEST: CPT

## 2021-03-30 PROCEDURE — 25010000002 HEPARIN (PORCINE) PER 1000 UNITS: Performed by: INTERNAL MEDICINE

## 2021-03-30 PROCEDURE — 25010000002 PIPERACILLIN SOD-TAZOBACTAM PER 1 G: Performed by: INTERNAL MEDICINE

## 2021-03-30 PROCEDURE — 63710000001 INSULIN DETEMIR PER 5 UNITS: Performed by: INTERNAL MEDICINE

## 2021-03-30 RX ORDER — SODIUM CHLORIDE 0.9 % (FLUSH) 0.9 %
10 SYRINGE (ML) INJECTION AS NEEDED
Status: DISCONTINUED | OUTPATIENT
Start: 2021-03-30 | End: 2021-03-31 | Stop reason: HOSPADM

## 2021-03-30 RX ORDER — TERAZOSIN 1 MG/1
1 CAPSULE ORAL NIGHTLY
Status: DISCONTINUED | OUTPATIENT
Start: 2021-03-30 | End: 2021-03-31 | Stop reason: HOSPADM

## 2021-03-30 RX ORDER — FERROUS SULFATE 325(65) MG
325 TABLET ORAL
Status: DISCONTINUED | OUTPATIENT
Start: 2021-03-30 | End: 2021-03-31 | Stop reason: HOSPADM

## 2021-03-30 RX ORDER — ATORVASTATIN CALCIUM 40 MG/1
80 TABLET, FILM COATED ORAL NIGHTLY
Status: DISCONTINUED | OUTPATIENT
Start: 2021-03-30 | End: 2021-03-31 | Stop reason: HOSPADM

## 2021-03-30 RX ORDER — ASPIRIN 81 MG/1
81 TABLET ORAL DAILY
Status: DISCONTINUED | OUTPATIENT
Start: 2021-03-30 | End: 2021-03-31 | Stop reason: HOSPADM

## 2021-03-30 RX ORDER — GABAPENTIN 400 MG/1
400 CAPSULE ORAL 3 TIMES DAILY
Status: DISCONTINUED | OUTPATIENT
Start: 2021-03-30 | End: 2021-03-31 | Stop reason: HOSPADM

## 2021-03-30 RX ORDER — HEPARIN SODIUM 5000 [USP'U]/ML
5000 INJECTION, SOLUTION INTRAVENOUS; SUBCUTANEOUS EVERY 8 HOURS SCHEDULED
Status: DISCONTINUED | OUTPATIENT
Start: 2021-03-30 | End: 2021-03-31 | Stop reason: HOSPADM

## 2021-03-30 RX ORDER — SODIUM CHLORIDE 0.9 % (FLUSH) 0.9 %
10 SYRINGE (ML) INJECTION EVERY 12 HOURS SCHEDULED
Status: DISCONTINUED | OUTPATIENT
Start: 2021-03-30 | End: 2021-03-31 | Stop reason: HOSPADM

## 2021-03-30 RX ORDER — AMLODIPINE BESYLATE 5 MG/1
5 TABLET ORAL
Status: DISCONTINUED | OUTPATIENT
Start: 2021-03-30 | End: 2021-03-30

## 2021-03-30 RX ORDER — NICOTINE POLACRILEX 4 MG
15 LOZENGE BUCCAL
Status: DISCONTINUED | OUTPATIENT
Start: 2021-03-30 | End: 2021-03-31 | Stop reason: HOSPADM

## 2021-03-30 RX ORDER — ALBUTEROL SULFATE 2.5 MG/3ML
2.5 SOLUTION RESPIRATORY (INHALATION) EVERY 4 HOURS PRN
Status: DISCONTINUED | OUTPATIENT
Start: 2021-03-30 | End: 2021-03-31 | Stop reason: HOSPADM

## 2021-03-30 RX ORDER — AMOXICILLIN 250 MG
2 CAPSULE ORAL 2 TIMES DAILY PRN
Status: DISCONTINUED | OUTPATIENT
Start: 2021-03-30 | End: 2021-03-31 | Stop reason: HOSPADM

## 2021-03-30 RX ORDER — ACETAMINOPHEN 650 MG/1
650 SUPPOSITORY RECTAL EVERY 4 HOURS PRN
Status: DISCONTINUED | OUTPATIENT
Start: 2021-03-30 | End: 2021-03-31 | Stop reason: HOSPADM

## 2021-03-30 RX ORDER — TRAMADOL HYDROCHLORIDE 50 MG/1
50 TABLET ORAL 2 TIMES DAILY PRN
Status: DISCONTINUED | OUTPATIENT
Start: 2021-03-30 | End: 2021-03-31 | Stop reason: HOSPADM

## 2021-03-30 RX ORDER — SODIUM CHLORIDE, SODIUM LACTATE, POTASSIUM CHLORIDE, CALCIUM CHLORIDE 600; 310; 30; 20 MG/100ML; MG/100ML; MG/100ML; MG/100ML
75 INJECTION, SOLUTION INTRAVENOUS CONTINUOUS
Status: ACTIVE | OUTPATIENT
Start: 2021-03-30 | End: 2021-03-30

## 2021-03-30 RX ORDER — TRAZODONE HYDROCHLORIDE 50 MG/1
25 TABLET ORAL NIGHTLY
Status: DISCONTINUED | OUTPATIENT
Start: 2021-03-30 | End: 2021-03-31 | Stop reason: HOSPADM

## 2021-03-30 RX ORDER — FLUOXETINE HYDROCHLORIDE 20 MG/1
20 CAPSULE ORAL DAILY
Status: DISCONTINUED | OUTPATIENT
Start: 2021-03-30 | End: 2021-03-31 | Stop reason: HOSPADM

## 2021-03-30 RX ORDER — PANTOPRAZOLE SODIUM 40 MG/1
40 TABLET, DELAYED RELEASE ORAL DAILY
Status: DISCONTINUED | OUTPATIENT
Start: 2021-03-30 | End: 2021-03-31 | Stop reason: HOSPADM

## 2021-03-30 RX ORDER — ACETAMINOPHEN 160 MG/5ML
650 SOLUTION ORAL EVERY 4 HOURS PRN
Status: DISCONTINUED | OUTPATIENT
Start: 2021-03-30 | End: 2021-03-31 | Stop reason: HOSPADM

## 2021-03-30 RX ORDER — AMLODIPINE BESYLATE 10 MG/1
10 TABLET ORAL
Status: DISCONTINUED | OUTPATIENT
Start: 2021-03-30 | End: 2021-03-31 | Stop reason: HOSPADM

## 2021-03-30 RX ORDER — PROMETHAZINE HYDROCHLORIDE 12.5 MG/1
12.5 TABLET ORAL EVERY 6 HOURS PRN
Status: DISCONTINUED | OUTPATIENT
Start: 2021-03-30 | End: 2021-03-31 | Stop reason: HOSPADM

## 2021-03-30 RX ORDER — ACETAMINOPHEN 325 MG/1
650 TABLET ORAL EVERY 4 HOURS PRN
Status: DISCONTINUED | OUTPATIENT
Start: 2021-03-30 | End: 2021-03-31 | Stop reason: HOSPADM

## 2021-03-30 RX ORDER — DEXTROSE MONOHYDRATE 25 G/50ML
25 INJECTION, SOLUTION INTRAVENOUS
Status: DISCONTINUED | OUTPATIENT
Start: 2021-03-30 | End: 2021-03-31 | Stop reason: HOSPADM

## 2021-03-30 RX ADMIN — FLUOXETINE HYDROCHLORIDE 20 MG: 20 CAPSULE ORAL at 08:36

## 2021-03-30 RX ADMIN — HEPARIN SODIUM 5000 UNITS: 5000 INJECTION INTRAVENOUS; SUBCUTANEOUS at 04:43

## 2021-03-30 RX ADMIN — HEPARIN SODIUM 5000 UNITS: 5000 INJECTION INTRAVENOUS; SUBCUTANEOUS at 22:21

## 2021-03-30 RX ADMIN — AMLODIPINE BESYLATE 10 MG: 10 TABLET ORAL at 08:36

## 2021-03-30 RX ADMIN — LABETALOL HYDROCHLORIDE 0.5 MG/MIN: 5 INJECTION, SOLUTION INTRAVENOUS at 03:40

## 2021-03-30 RX ADMIN — TAZOBACTAM SODIUM AND PIPERACILLIN SODIUM 3.38 G: 375; 3 INJECTION, SOLUTION INTRAVENOUS at 08:37

## 2021-03-30 RX ADMIN — TAZOBACTAM SODIUM AND PIPERACILLIN SODIUM 3.38 G: 375; 3 INJECTION, SOLUTION INTRAVENOUS at 01:50

## 2021-03-30 RX ADMIN — INSULIN LISPRO 2 UNITS: 100 INJECTION, SOLUTION INTRAVENOUS; SUBCUTANEOUS at 17:56

## 2021-03-30 RX ADMIN — GABAPENTIN 400 MG: 400 CAPSULE ORAL at 15:59

## 2021-03-30 RX ADMIN — INSULIN DETEMIR 15 UNITS: 100 INJECTION, SOLUTION SUBCUTANEOUS at 22:22

## 2021-03-30 RX ADMIN — TRAMADOL HYDROCHLORIDE 50 MG: 50 TABLET, FILM COATED ORAL at 04:43

## 2021-03-30 RX ADMIN — GABAPENTIN 400 MG: 400 CAPSULE ORAL at 08:36

## 2021-03-30 RX ADMIN — GABAPENTIN 400 MG: 400 CAPSULE ORAL at 22:20

## 2021-03-30 RX ADMIN — INSULIN LISPRO 2 UNITS: 100 INJECTION, SOLUTION INTRAVENOUS; SUBCUTANEOUS at 11:54

## 2021-03-30 RX ADMIN — TERAZOSIN HYDROCHLORIDE 1 MG: 1 CAPSULE ORAL at 22:20

## 2021-03-30 RX ADMIN — ATORVASTATIN CALCIUM 80 MG: 40 TABLET, FILM COATED ORAL at 22:20

## 2021-03-30 RX ADMIN — PANTOPRAZOLE SODIUM 40 MG: 40 TABLET, DELAYED RELEASE ORAL at 04:43

## 2021-03-30 RX ADMIN — ASPIRIN 81 MG: 81 TABLET, COATED ORAL at 08:36

## 2021-03-30 RX ADMIN — TRAZODONE HYDROCHLORIDE 25 MG: 50 TABLET ORAL at 22:21

## 2021-03-30 RX ADMIN — INSULIN LISPRO 4 UNITS: 100 INJECTION, SOLUTION INTRAVENOUS; SUBCUTANEOUS at 08:37

## 2021-03-30 RX ADMIN — HEPARIN SODIUM 5000 UNITS: 5000 INJECTION INTRAVENOUS; SUBCUTANEOUS at 14:26

## 2021-03-30 RX ADMIN — SODIUM CHLORIDE, PRESERVATIVE FREE 10 ML: 5 INJECTION INTRAVENOUS at 22:20

## 2021-03-30 RX ADMIN — FERROUS SULFATE TAB 325 MG (65 MG ELEMENTAL FE) 325 MG: 325 (65 FE) TAB at 08:37

## 2021-03-30 RX ADMIN — INSULIN DETEMIR 15 UNITS: 100 INJECTION, SOLUTION SUBCUTANEOUS at 04:44

## 2021-03-30 RX ADMIN — SODIUM CHLORIDE, POTASSIUM CHLORIDE, SODIUM LACTATE AND CALCIUM CHLORIDE 75 ML/HR: 600; 310; 30; 20 INJECTION, SOLUTION INTRAVENOUS at 03:37

## 2021-03-30 RX ADMIN — TAZOBACTAM SODIUM AND PIPERACILLIN SODIUM 3.38 G: 375; 3 INJECTION, SOLUTION INTRAVENOUS at 15:59

## 2021-03-31 ENCOUNTER — READMISSION MANAGEMENT (OUTPATIENT)
Dept: CALL CENTER | Facility: HOSPITAL | Age: 61
End: 2021-03-31

## 2021-03-31 VITALS
BODY MASS INDEX: 21.49 KG/M2 | HEIGHT: 65 IN | RESPIRATION RATE: 18 BRPM | TEMPERATURE: 99.2 F | WEIGHT: 129 LBS | SYSTOLIC BLOOD PRESSURE: 144 MMHG | OXYGEN SATURATION: 98 % | DIASTOLIC BLOOD PRESSURE: 75 MMHG | HEART RATE: 74 BPM

## 2021-03-31 LAB
ANION GAP SERPL CALCULATED.3IONS-SCNC: 10 MMOL/L (ref 5–15)
BASOPHILS # BLD AUTO: 0.01 10*3/MM3 (ref 0–0.2)
BASOPHILS NFR BLD AUTO: 0.1 % (ref 0–1.5)
BUN SERPL-MCNC: 21 MG/DL (ref 8–23)
BUN/CREAT SERPL: 18.3 (ref 7–25)
CALCIUM SPEC-SCNC: 9.1 MG/DL (ref 8.6–10.5)
CHLORIDE SERPL-SCNC: 107 MMOL/L (ref 98–107)
CO2 SERPL-SCNC: 26 MMOL/L (ref 22–29)
CREAT SERPL-MCNC: 1.15 MG/DL (ref 0.57–1)
DEPRECATED RDW RBC AUTO: 44.3 FL (ref 37–54)
EOSINOPHIL # BLD AUTO: 0.01 10*3/MM3 (ref 0–0.4)
EOSINOPHIL NFR BLD AUTO: 0.1 % (ref 0.3–6.2)
ERYTHROCYTE [DISTWIDTH] IN BLOOD BY AUTOMATED COUNT: 13.2 % (ref 12.3–15.4)
GFR SERPL CREATININE-BSD FRML MDRD: 58 ML/MIN/1.73
GLUCOSE BLDC GLUCOMTR-MCNC: 180 MG/DL (ref 70–130)
GLUCOSE BLDC GLUCOMTR-MCNC: 96 MG/DL (ref 70–130)
GLUCOSE SERPL-MCNC: 106 MG/DL (ref 65–99)
HCT VFR BLD AUTO: 35.8 % (ref 34–46.6)
HGB BLD-MCNC: 11.9 G/DL (ref 12–15.9)
IMM GRANULOCYTES # BLD AUTO: 0.02 10*3/MM3 (ref 0–0.05)
IMM GRANULOCYTES NFR BLD AUTO: 0.2 % (ref 0–0.5)
LYMPHOCYTES # BLD AUTO: 2.01 10*3/MM3 (ref 0.7–3.1)
LYMPHOCYTES NFR BLD AUTO: 22.1 % (ref 19.6–45.3)
MAGNESIUM SERPL-MCNC: 2.2 MG/DL (ref 1.6–2.4)
MCH RBC QN AUTO: 30.4 PG (ref 26.6–33)
MCHC RBC AUTO-ENTMCNC: 33.2 G/DL (ref 31.5–35.7)
MCV RBC AUTO: 91.6 FL (ref 79–97)
MONOCYTES # BLD AUTO: 0.71 10*3/MM3 (ref 0.1–0.9)
MONOCYTES NFR BLD AUTO: 7.8 % (ref 5–12)
NEUTROPHILS NFR BLD AUTO: 6.32 10*3/MM3 (ref 1.7–7)
NEUTROPHILS NFR BLD AUTO: 69.7 % (ref 42.7–76)
NRBC BLD AUTO-RTO: 0 /100 WBC (ref 0–0.2)
PLATELET # BLD AUTO: 315 10*3/MM3 (ref 140–450)
PMV BLD AUTO: 10.2 FL (ref 6–12)
POTASSIUM SERPL-SCNC: 2.9 MMOL/L (ref 3.5–5.2)
RBC # BLD AUTO: 3.91 10*6/MM3 (ref 3.77–5.28)
SODIUM SERPL-SCNC: 143 MMOL/L (ref 136–145)
WBC # BLD AUTO: 9.08 10*3/MM3 (ref 3.4–10.8)

## 2021-03-31 PROCEDURE — 63710000001 INSULIN LISPRO (HUMAN) PER 5 UNITS: Performed by: INTERNAL MEDICINE

## 2021-03-31 PROCEDURE — 99239 HOSP IP/OBS DSCHRG MGMT >30: CPT | Performed by: NURSE PRACTITIONER

## 2021-03-31 PROCEDURE — 82962 GLUCOSE BLOOD TEST: CPT

## 2021-03-31 PROCEDURE — 25010000002 PIPERACILLIN SOD-TAZOBACTAM PER 1 G: Performed by: INTERNAL MEDICINE

## 2021-03-31 PROCEDURE — 80048 BASIC METABOLIC PNL TOTAL CA: CPT | Performed by: INTERNAL MEDICINE

## 2021-03-31 PROCEDURE — 85025 COMPLETE CBC W/AUTO DIFF WBC: CPT | Performed by: INTERNAL MEDICINE

## 2021-03-31 PROCEDURE — 25010000002 HEPARIN (PORCINE) PER 1000 UNITS: Performed by: INTERNAL MEDICINE

## 2021-03-31 PROCEDURE — 83735 ASSAY OF MAGNESIUM: CPT | Performed by: HOSPITALIST

## 2021-03-31 RX ORDER — POTASSIUM CHLORIDE 750 MG/1
40 CAPSULE, EXTENDED RELEASE ORAL AS NEEDED
Status: DISCONTINUED | OUTPATIENT
Start: 2021-03-31 | End: 2021-03-31 | Stop reason: HOSPADM

## 2021-03-31 RX ORDER — POTASSIUM CHLORIDE 1.5 G/1.77G
40 POWDER, FOR SOLUTION ORAL AS NEEDED
Status: DISCONTINUED | OUTPATIENT
Start: 2021-03-31 | End: 2021-03-31 | Stop reason: HOSPADM

## 2021-03-31 RX ORDER — AMOXICILLIN AND CLAVULANATE POTASSIUM 875; 125 MG/1; MG/1
1 TABLET, FILM COATED ORAL 2 TIMES DAILY
Qty: 8 TABLET | Refills: 0 | Status: SHIPPED | OUTPATIENT
Start: 2021-03-31 | End: 2021-04-04

## 2021-03-31 RX ORDER — POTASSIUM CHLORIDE 750 MG/1
40 CAPSULE, EXTENDED RELEASE ORAL AS NEEDED
Status: DISCONTINUED | OUTPATIENT
Start: 2021-03-31 | End: 2021-03-31 | Stop reason: SDUPTHER

## 2021-03-31 RX ORDER — POTASSIUM CHLORIDE 7.45 MG/ML
10 INJECTION INTRAVENOUS
Status: DISCONTINUED | OUTPATIENT
Start: 2021-03-31 | End: 2021-03-31 | Stop reason: HOSPADM

## 2021-03-31 RX ORDER — POTASSIUM CHLORIDE 1.5 G/1.77G
40 POWDER, FOR SOLUTION ORAL AS NEEDED
Status: DISCONTINUED | OUTPATIENT
Start: 2021-03-31 | End: 2021-03-31 | Stop reason: SDUPTHER

## 2021-03-31 RX ADMIN — GABAPENTIN 400 MG: 400 CAPSULE ORAL at 09:13

## 2021-03-31 RX ADMIN — POTASSIUM CHLORIDE 40 MEQ: 750 CAPSULE, EXTENDED RELEASE ORAL at 14:02

## 2021-03-31 RX ADMIN — POTASSIUM CHLORIDE 40 MEQ: 750 CAPSULE, EXTENDED RELEASE ORAL at 09:13

## 2021-03-31 RX ADMIN — AMLODIPINE BESYLATE 10 MG: 10 TABLET ORAL at 09:13

## 2021-03-31 RX ADMIN — GABAPENTIN 400 MG: 400 CAPSULE ORAL at 14:02

## 2021-03-31 RX ADMIN — HEPARIN SODIUM 5000 UNITS: 5000 INJECTION INTRAVENOUS; SUBCUTANEOUS at 05:38

## 2021-03-31 RX ADMIN — SODIUM CHLORIDE, PRESERVATIVE FREE 10 ML: 5 INJECTION INTRAVENOUS at 09:13

## 2021-03-31 RX ADMIN — ASPIRIN 81 MG: 81 TABLET, COATED ORAL at 09:14

## 2021-03-31 RX ADMIN — TAZOBACTAM SODIUM AND PIPERACILLIN SODIUM 3.38 G: 375; 3 INJECTION, SOLUTION INTRAVENOUS at 02:21

## 2021-03-31 RX ADMIN — TAZOBACTAM SODIUM AND PIPERACILLIN SODIUM 3.38 G: 375; 3 INJECTION, SOLUTION INTRAVENOUS at 09:13

## 2021-03-31 RX ADMIN — INSULIN LISPRO 2 UNITS: 100 INJECTION, SOLUTION INTRAVENOUS; SUBCUTANEOUS at 11:50

## 2021-03-31 RX ADMIN — FLUOXETINE HYDROCHLORIDE 20 MG: 20 CAPSULE ORAL at 09:13

## 2021-03-31 RX ADMIN — PANTOPRAZOLE SODIUM 40 MG: 40 TABLET, DELAYED RELEASE ORAL at 05:38

## 2021-03-31 RX ADMIN — FERROUS SULFATE TAB 325 MG (65 MG ELEMENTAL FE) 325 MG: 325 (65 FE) TAB at 09:13

## 2021-04-01 ENCOUNTER — TRANSITIONAL CARE MANAGEMENT TELEPHONE ENCOUNTER (OUTPATIENT)
Dept: CALL CENTER | Facility: HOSPITAL | Age: 61
End: 2021-04-01

## 2021-04-01 LAB — BACTERIA SPEC AEROBE CULT: ABNORMAL

## 2021-04-01 NOTE — OUTREACH NOTE
Call Center TCM Note      Responses   [facility] patient discharged from?  [facility]   Does the patient have one of the following disease processes/diagnoses(primary or secondary)?  Other   TCM attempt successful?  Yes   Call start time  1147   Call end time  1150   Discharge diagnosis  acute UTI   Is patient permission given to speak with other caregiver?  Yes   List who call center can speak with  juni Parr   Person spoke with today (if not patient) and relationship  juni Parr   Does the patient have all medications ordered at discharge?  Yes   Is the patient taking all medications as directed (includes completed medication regime)?  Yes   Does the patient have a primary care provider?   Yes   Does the patient have an appointment with their PCP within 7 days of discharge?  Yes   Comments regarding PCP  f/u with HOWIE Keenan on 4/2   Has the patient kept scheduled appointments due by today?  N/A   Did the patient receive a copy of their discharge instructions?  Yes   What is the patient's perception of their health status since discharge?  Improving   Is the patient/caregiver able to teach back the hierarchy of who to call/visit for symptoms/problems? PCP, Specialist, Home health nurse, Urgent Care, ED, 911  Yes   TCM call completed?  Yes   Wrap up additional comments  Brief call with son, jose juan states patient is doing well, confirmed f/u with PCP for tomorrow (4/2) at 2:45 pm.          Irma Lamas RN    4/1/2021, 11:50 EDT

## 2021-04-02 ENCOUNTER — OFFICE VISIT (OUTPATIENT)
Dept: INTERNAL MEDICINE | Facility: CLINIC | Age: 61
End: 2021-04-02

## 2021-04-02 VITALS
BODY MASS INDEX: 20.58 KG/M2 | SYSTOLIC BLOOD PRESSURE: 122 MMHG | WEIGHT: 123.5 LBS | RESPIRATION RATE: 16 BRPM | HEIGHT: 65 IN | OXYGEN SATURATION: 98 % | DIASTOLIC BLOOD PRESSURE: 72 MMHG | HEART RATE: 71 BPM

## 2021-04-02 DIAGNOSIS — M54.50 CHRONIC MIDLINE LOW BACK PAIN WITHOUT SCIATICA: ICD-10-CM

## 2021-04-02 DIAGNOSIS — Z79.4 TYPE 2 DIABETES MELLITUS WITH HYPERGLYCEMIA, WITH LONG-TERM CURRENT USE OF INSULIN (HCC): Primary | ICD-10-CM

## 2021-04-02 DIAGNOSIS — G89.29 CHRONIC MIDLINE LOW BACK PAIN WITHOUT SCIATICA: ICD-10-CM

## 2021-04-02 DIAGNOSIS — I10 ESSENTIAL HYPERTENSION: ICD-10-CM

## 2021-04-02 DIAGNOSIS — R33.9 URINARY RETENTION: ICD-10-CM

## 2021-04-02 DIAGNOSIS — E11.65 TYPE 2 DIABETES MELLITUS WITH HYPERGLYCEMIA, WITH LONG-TERM CURRENT USE OF INSULIN (HCC): Primary | ICD-10-CM

## 2021-04-02 PROCEDURE — 99214 OFFICE O/P EST MOD 30 MIN: CPT | Performed by: NURSE PRACTITIONER

## 2021-04-02 RX ORDER — INSULIN GLARGINE 100 [IU]/ML
40 INJECTION, SOLUTION SUBCUTANEOUS NIGHTLY
Qty: 5 PEN | Refills: 2 | Status: ON HOLD | OUTPATIENT
Start: 2021-04-02 | End: 2021-05-06 | Stop reason: SDUPTHER

## 2021-04-02 RX ORDER — TRAMADOL HYDROCHLORIDE 50 MG/1
50 TABLET ORAL EVERY 6 HOURS PRN
Qty: 28 TABLET | Refills: 0 | Status: SHIPPED | OUTPATIENT
Start: 2021-04-02 | End: 2021-05-13 | Stop reason: SDUPTHER

## 2021-04-02 NOTE — PROGRESS NOTES
Subjective   Chief Complaint   Patient presents with   • Hypertension   • Diabetes      Thelma Michael is a 60 y.o. female here today for diabetes, hypertension, back pain, and urinary retention.  Patient states that overall blood sugars are stable and she is having some lows dropping into the 60s making her feel dizzy and nauseated.  She has been noncompliant with medications in the past but states that she is taking her insulin as prescribed.  A1c on 3/29 was 12.8.  Insulin was increased during her recent hospitalization.  Blood pressure stable and at goal.  No headaches, changes in vision, shortness of breath, or chest pain.  She continues to have back pain and some generalized joint pain but tramadol gabapentin greatly helps.  She is unable to take NSAIDs due to risk for kidney injury.  Tylenol does not help with her pain.  She continues to experience urinary retention and is seeing urologist Dr. Eden.  She is in and out cathing daily.  He has her doing this more frequently to reduce risk of urinary tract infections due to urinary stasis.      I have reviewed the following portions of the patient's history and confirmed they are accurate: allergies, current medications, past family history, past medical history, past social history, past surgical history and problem list    I have personally completed the patient's review of systems.    Review of Systems   Constitutional: Positive for fatigue. Negative for activity change, appetite change, chills, diaphoresis, fever, unexpected weight gain and unexpected weight loss.   HENT: Negative for ear discharge, ear pain, mouth sores, nosebleeds, sinus pressure, sneezing and sore throat.    Eyes: Negative for pain, discharge and itching.   Respiratory: Negative for cough, chest tightness, shortness of breath and wheezing.    Cardiovascular: Negative for chest pain and palpitations.   Gastrointestinal: Negative for abdominal pain, diarrhea, nausea, vomiting, GERD  and indigestion.   Endocrine: Negative for heat intolerance, polydipsia and polyphagia.   Genitourinary: Negative for dysuria, flank pain, frequency, hematuria, pelvic pain, pelvic pressure and urgency.   Musculoskeletal: Positive for arthralgias, back pain and myalgias. Negative for gait problem, joint swelling, neck pain and neck stiffness.   Skin: Negative for color change, pallor and rash.   Allergic/Immunologic: Negative for immunocompromised state.   Neurological: Positive for numbness and memory problem. Negative for dizziness, seizures, speech difficulty, light-headedness, headache and confusion.   Hematological: Negative for adenopathy. Bruises/bleeds easily.   Psychiatric/Behavioral: Positive for stress. Negative for agitation, decreased concentration, dysphoric mood, sleep disturbance and depressed mood. The patient is nervous/anxious.        Current Outpatient Medications on File Prior to Visit   Medication Sig   • albuterol sulfate  (90 Base) MCG/ACT inhaler Inhale 2 puffs Every 4 (Four) Hours As Needed for Wheezing.   • amLODIPine (NORVASC) 10 MG tablet Take 1 tablet by mouth Daily.   • Ascorbic Acid (VITAMIN C PO) Vitamin C TABS   • aspirin 81 MG tablet Take 1 tablet by mouth Daily.   • atorvastatin (LIPITOR) 80 MG tablet Take 1 tablet by mouth Every Night.   • Blood Glucose Monitoring Suppl (FREESTYLE FREEDOM LITE) w/Device kit 1 kit 3 (Three) Times a Day. TEST; For: Diabetic hypoglycemia, Diabetic peripheral neuropathy   • dicyclomine (Bentyl) 20 MG tablet Take 1 tablet by mouth Every 6 (Six) Hours As Needed (diarrhea).   • doxazosin (CARDURA) 1 MG tablet Take 1 tablet by mouth Every Night.   • ferrous sulfate 325 (65 FE) MG tablet Take 1 tablet by mouth Daily With Breakfast. Take with orange juice or vitamin C   • FLUoxetine (PROzac) 20 MG capsule Take 1 capsule by mouth Daily.   • gabapentin (NEURONTIN) 400 MG capsule Take 1 capsule by mouth 3 (Three) Times a Day.   • glucose blood (Glucose  "Meter Test) test strip Use as instructed to check blood glucose levels 3 times daily   • glucose monitor monitoring kit 1 each As Needed (Check blood sugars 3 times daily.).   • Insulin Lispro (ADMELOG SOLOSTAR) 100 UNIT/ML injection pen Inject 0-14 units under skin into appropriate area based upon sliding scale 3 times daily with meals as needed. Do not exceed 40 units per day.   • Insulin Pen Needle (BD Pen Needle Cydney U/F) 32G X 4 MM misc Take 1 each by mouth 4 (Four) Times a Day.   • Lancets misc Use as instructed to test blood glucose levels 3 times daily.   • pantoprazole (PROTONIX) 40 MG EC tablet Take 1 tablet by mouth Daily.   • PHARMACY MEDS TO BED CONSULT Daily.   • promethazine (PHENERGAN) 12.5 MG tablet Take 1 tablet by mouth Every 6 (Six) Hours As Needed for Nausea or Vomiting.   • sennosides-docusate (senna-docusate sodium) 8.6-50 MG per tablet Take 2 tablets by mouth 2 (Two) Times a Day As Needed for Constipation.   • traZODone (DESYREL) 50 MG tablet Take 1-2 tablets one hour before bedtime as needed for sleep.     No current facility-administered medications on file prior to visit.       Objective   Vitals:    04/02/21 1432   BP: 122/72   Pulse: 71   Resp: 16   SpO2: 98%   Weight: 56 kg (123 lb 8 oz)   Height: 165.1 cm (65\")     Body mass index is 20.55 kg/m².    Physical Exam  Vitals reviewed.   Constitutional:       Appearance: Normal appearance. She is well-developed.   HENT:      Head: Normocephalic and atraumatic.      Nose: Nose normal.   Eyes:      General: Lids are normal.      Conjunctiva/sclera: Conjunctivae normal.      Pupils: Pupils are equal, round, and reactive to light.   Neck:      Thyroid: No thyromegaly.      Trachea: Trachea normal.   Cardiovascular:      Rate and Rhythm: Normal rate and regular rhythm.      Heart sounds: Normal heart sounds.   Pulmonary:      Effort: Pulmonary effort is normal. No respiratory distress.      Breath sounds: Normal breath sounds.   Musculoskeletal: "      Lumbar back: Spasms and tenderness present.   Skin:     General: Skin is warm and dry.   Neurological:      Mental Status: She is alert and oriented to person, place, and time.      GCS: GCS eye subscore is 4. GCS verbal subscore is 5. GCS motor subscore is 6.   Psychiatric:         Attention and Perception: Attention normal.         Mood and Affect: Mood and affect normal.         Speech: Speech normal.         Behavior: Behavior normal. Behavior is cooperative.         Thought Content: Thought content normal.         Assessment/Plan   Problem List Items Addressed This Visit        Cardiac and Vasculature    Hypertension    Overview     Chronic issue stable requiring medication management and monitoring. Will eat well balanced heart healthy diet, drink adequate water, increase physical activity, and get adequate rest. Monitor blood pressures daily and contact office for any readings consistently above 140/90. Patient will report any associated symptoms such as headaches, blurry vision, or nausea. Patient will go to ER for any chest pressure or chest pain.   Continue amlodipine and doxazosin.            Endocrine and Metabolic    Type 2 diabetes mellitus with hyperglycemia (CMS/[facility]) - Primary    Overview     Chronic issue unstable requiring medication management and monitoring. Will eat well balanced heart healthy diabetic diet, drink adequate water, increase physical activity, and get adequate rest. Will monitor blood sugars daily and keep log for next appt. Will contact office earlier if blood sugars are averaging above 250.   Continue Basaglar and Admelog.  We will keep close log of blood sugars for next appointment.         Relevant Medications    Insulin Glargine (BASAGLAR KWIKPEN) 100 UNIT/ML injection pen       Genitourinary and Reproductive     Urinary retention  Chronic issue unstable requiring in and out cathing continued follow-ups with urology.  Discussed importance of cleaning and hygiene with in  and out cathing to reduce risk of UTIs.       Musculoskeletal and Injuries    Back pain  Chronic issue stable requiring medication management and monitoring. Will eat well balanced diet, increase water intake, increase physical activity as tolerated, and get adequate rest. Can use warmth or ice for discomfort in this area. Discussed stretching exercises.   Continue tramadol and gabapentin as needed.      Relevant Medications    traMADol (ULTRAM) 50 MG tablet             Current Outpatient Medications:   •  albuterol sulfate  (90 Base) MCG/ACT inhaler, Inhale 2 puffs Every 4 (Four) Hours As Needed for Wheezing., Disp: 18 g, Rfl: 5  •  amLODIPine (NORVASC) 10 MG tablet, Take 1 tablet by mouth Daily., Disp: 30 tablet, Rfl: 5  •  Ascorbic Acid (VITAMIN C PO), Vitamin C TABS, Disp: , Rfl:   •  aspirin 81 MG tablet, Take 1 tablet by mouth Daily., Disp: 30 tablet, Rfl: 0  •  atorvastatin (LIPITOR) 80 MG tablet, Take 1 tablet by mouth Every Night., Disp: 30 tablet, Rfl: 5  •  Blood Glucose Monitoring Suppl (FREESTYLE FREEDOM LITE) w/Device kit, 1 kit 3 (Three) Times a Day. TEST; For: Diabetic hypoglycemia, Diabetic peripheral neuropathy, Disp: 1 each, Rfl: 0  •  dicyclomine (Bentyl) 20 MG tablet, Take 1 tablet by mouth Every 6 (Six) Hours As Needed (diarrhea)., Disp: 60 tablet, Rfl: 5  •  doxazosin (CARDURA) 1 MG tablet, Take 1 tablet by mouth Every Night., Disp: 30 tablet, Rfl: 5  •  ferrous sulfate 325 (65 FE) MG tablet, Take 1 tablet by mouth Daily With Breakfast. Take with orange juice or vitamin C, Disp: 30 tablet, Rfl: 2  •  FLUoxetine (PROzac) 20 MG capsule, Take 1 capsule by mouth Daily., Disp: 30 capsule, Rfl: 1  •  gabapentin (NEURONTIN) 400 MG capsule, Take 1 capsule by mouth 3 (Three) Times a Day., Disp: 90 capsule, Rfl: 2  •  glucose blood (Glucose Meter Test) test strip, Use as instructed to check blood glucose levels 3 times daily, Disp: 100 each, Rfl: 5  •  glucose monitor monitoring kit, 1 each As  Needed (Check blood sugars 3 times daily.)., Disp: 1 each, Rfl: 0  •  Insulin Glargine (BASAGLAR KWIKPEN) 100 UNIT/ML injection pen, Inject 40 Units under the skin into the appropriate area as directed Every Night., Disp: 5 pen, Rfl: 2  •  Insulin Lispro (ADMELOG SOLOSTAR) 100 UNIT/ML injection pen, Inject 0-14 units under skin into appropriate area based upon sliding scale 3 times daily with meals as needed. Do not exceed 40 units per day., Disp: 5 pen, Rfl: 5  •  Insulin Pen Needle (BD Pen Needle Cydney U/F) 32G X 4 MM misc, Take 1 each by mouth 4 (Four) Times a Day., Disp: 100 each, Rfl: 5  •  Lancets misc, Use as instructed to test blood glucose levels 3 times daily., Disp: 100 each, Rfl: 5  •  pantoprazole (PROTONIX) 40 MG EC tablet, Take 1 tablet by mouth Daily., Disp: 30 tablet, Rfl: 5  •  PHARMACY MEDS TO BED CONSULT, Daily., Disp: , Rfl:   •  promethazine (PHENERGAN) 12.5 MG tablet, Take 1 tablet by mouth Every 6 (Six) Hours As Needed for Nausea or Vomiting., Disp: 20 tablet, Rfl: 0  •  sennosides-docusate (senna-docusate sodium) 8.6-50 MG per tablet, Take 2 tablets by mouth 2 (Two) Times a Day As Needed for Constipation., Disp: 60 tablet, Rfl: 5  •  traZODone (DESYREL) 50 MG tablet, Take 1-2 tablets one hour before bedtime as needed for sleep., Disp: 45 tablet, Rfl: 1  •  traMADol (ULTRAM) 50 MG tablet, Take 1 tablet by mouth Every 6 (Six) Hours As Needed for Moderate Pain ., Disp: 28 tablet, Rfl: 0       Plan of care reviewed with the patient at the conclusion of today's visit.  Education was provided regarding diagnosis, management, and any prescribed or recommended OTC medications.  Patient verbalized understanding of and agreement with management plan.     Return in about 3 months (around 7/2/2021), or if symptoms worsen or fail to improve, for telehealth for next appt ok. .      HOWIE Mckenna    Please note that portions of this note were completed with a voice recognition program.  Efforts were made to edit the dictations, but occasionally words are mistranscribed.

## 2021-04-06 ENCOUNTER — PRIOR AUTHORIZATION (OUTPATIENT)
Dept: INTERNAL MEDICINE | Facility: CLINIC | Age: 61
End: 2021-04-06

## 2021-04-07 ENCOUNTER — READMISSION MANAGEMENT (OUTPATIENT)
Dept: CALL CENTER | Facility: HOSPITAL | Age: 61
End: 2021-04-07

## 2021-04-07 NOTE — TELEPHONE ENCOUNTER
Prior Authorization for Basaglar Kwikpen 100unit/mL pen-injectors completed and sent to plan via Covermymeds. Status pending;   KEY:[id number]  PA Case ID:[id number]

## 2021-04-08 ENCOUNTER — READMISSION MANAGEMENT (OUTPATIENT)
Dept: CALL CENTER | Facility: HOSPITAL | Age: 61
End: 2021-04-08

## 2021-04-08 NOTE — OUTREACH NOTE
Medical Week 2 Survey      Responses   [facility] patient discharged from?  [place]   Does the patient have one of the following disease processes/diagnoses(primary or secondary)?  Other   Week 2 attempt successful?  No   Unsuccessful attempts  Attempt 2          Ira Martinez RN

## 2021-04-15 ENCOUNTER — READMISSION MANAGEMENT (OUTPATIENT)
Dept: CALL CENTER | Facility: HOSPITAL | Age: 61
End: 2021-04-15

## 2021-04-15 ENCOUNTER — TELEPHONE (OUTPATIENT)
Dept: INTERNAL MEDICINE | Facility: CLINIC | Age: 61
End: 2021-04-15

## 2021-04-15 NOTE — TELEPHONE ENCOUNTER
Pt advised once she receives the paperwork she will need to schedule an appt with provider to discuss andcomplete the documentation

## 2021-04-15 NOTE — TELEPHONE ENCOUNTER
Caller: Thelma Michael    Relationship: Self    Best call back number: [phone]    What is the best time to reach you: ASAP    Who are you requesting to speak with (clinical staff, provider,  specific staff member):PROVIDER    What was the call regarding: PT STATED SHE IS NEEDING TO DO SHORT TERM DISABILITY DUE TO NAUSEA  SHE IS REQUESTING A CALL BACK TO DISCUSS    Do you require a callback: YES

## 2021-04-15 NOTE — OUTREACH NOTE
Medical Week 3 Survey      Responses   [facility] patient discharged from?  [place]   Does the patient have one of the following disease processes/diagnoses(primary or secondary)?  Other   Week 3 attempt successful?  Yes   Call start time  1228   Call end time  1231   Discharge diagnosis  acute UTI   Meds reviewed with patient/caregiver?  Yes   Is the patient having any side effects they believe may be caused by any medication additions or changes?  No   Does the patient have all medications ordered at discharge?  Yes   Is the patient taking all medications as directed (includes completed medication regime)?  Yes   Does the patient have a primary care provider?   Yes   Comments regarding PCP  Saw PCP on 04/02/21   Does the patient have an appointment with their PCP within 7 days of discharge?  Yes   Has the patient kept scheduled appointments due by today?  N/A   Has home health visited the patient within 72 hours of discharge?  N/A   Psychosocial issues?  No   Did the patient receive a copy of their discharge instructions?  Yes   Nursing interventions  Reviewed instructions with patient   What is the patient's perception of their health status since discharge?  Improving   Is the patient/caregiver able to teach back signs and symptoms related to disease process for when to call PCP?  Yes   Is the patient/caregiver able to teach back signs and symptoms related to disease process for when to call 911?  Yes   Is the patient/caregiver able to teach back the hierarchy of who to call/visit for symptoms/problems? PCP, Specialist, Home health nurse, Urgent Care, ED, 911  Yes   Additional teach back comments  States she is doing well, just waiting to keep her other appt.    Week 3 Call Completed?  Yes          Lisy Solo RN

## 2021-04-16 ENCOUNTER — TELEPHONE (OUTPATIENT)
Dept: INTERNAL MEDICINE | Facility: CLINIC | Age: 61
End: 2021-04-16

## 2021-04-16 NOTE — TELEPHONE ENCOUNTER
Advised  that Pt has not received her short term disability paperwork as of yet. Pt is going to schedule an appt with PCP once she receives this, therefore we have no documentation we can send at this time.

## 2021-04-16 NOTE — TELEPHONE ENCOUNTER
Caller: ROBERTA CLAYTON    Relationship:  [employer]    Best call back number:  [phone]    What form or medical record are you requesting: FORMS FOR SHORT TERM DISABILITY AND NEEDING DIAGNOSIS AND CONFIRMATION THAT SHE NEEDS TO BE OFF WORK      How would you like to receive the form or medical records (pick-up, mail, fax):  If fax, what is the fax number: [phone]  If mail, what is the address:   ITimeframe paperwork needed: ASAP

## 2021-04-22 ENCOUNTER — TELEPHONE (OUTPATIENT)
Dept: INTERNAL MEDICINE | Facility: CLINIC | Age: 61
End: 2021-04-22

## 2021-04-22 ENCOUNTER — READMISSION MANAGEMENT (OUTPATIENT)
Dept: CALL CENTER | Facility: HOSPITAL | Age: 61
End: 2021-04-22

## 2021-04-22 NOTE — TELEPHONE ENCOUNTER
Caller: ROBERTA CLAYTON    Relationship: [employer]    Best call back number: [phone]    What form or medical record are you requesting: SHORT TERM DISABITLY    Who is requesting this form or medical record from you: [employer]    How would you like to receive the form or medical records (pick-up, mail, fax): FAX  If fax, what is the fax number: [phone]  If mail, what is the address:   If pick-up, provide patient with address and location details    Timeframe paperwork needed: ASAP    Additional notes: ROBERTA STATED THAT IT WAS FAX TO OUR OFFICE ON 4/16/21 AND HAS NOT HAD A RESPONSE FROM OUR OFFICE. BRAIN STATED THAT HE WILL FAX AGAIN. IT CAN BE RETURNED BY FAX OR A RETURN CALL

## 2021-04-22 NOTE — OUTREACH NOTE
Medical Week 4 Survey      Responses   [facility] patient discharged from?  [place]   Does the patient have one of the following disease processes/diagnoses(primary or secondary)?  Other   Week 4 attempt successful?  No          Alejandra Pugh RN

## 2021-04-23 ENCOUNTER — OFFICE VISIT (OUTPATIENT)
Dept: INTERNAL MEDICINE | Facility: CLINIC | Age: 61
End: 2021-04-23

## 2021-04-23 DIAGNOSIS — G89.29 CHRONIC RIGHT-SIDED THORACIC BACK PAIN: ICD-10-CM

## 2021-04-23 DIAGNOSIS — R10.84 GENERALIZED ABDOMINAL PAIN: ICD-10-CM

## 2021-04-23 DIAGNOSIS — G89.29 CHRONIC MIDLINE LOW BACK PAIN WITHOUT SCIATICA: ICD-10-CM

## 2021-04-23 DIAGNOSIS — K52.9 CHRONIC DIARRHEA: Primary | ICD-10-CM

## 2021-04-23 DIAGNOSIS — M54.50 CHRONIC MIDLINE LOW BACK PAIN WITHOUT SCIATICA: ICD-10-CM

## 2021-04-23 DIAGNOSIS — D50.8 IRON DEFICIENCY ANEMIA SECONDARY TO INADEQUATE DIETARY IRON INTAKE: ICD-10-CM

## 2021-04-23 DIAGNOSIS — M54.6 CHRONIC RIGHT-SIDED THORACIC BACK PAIN: ICD-10-CM

## 2021-04-23 DIAGNOSIS — E87.6 HYPOKALEMIA: ICD-10-CM

## 2021-04-23 PROCEDURE — 99443 PR PHYS/QHP TELEPHONE EVALUATION 21-30 MIN: CPT | Performed by: NURSE PRACTITIONER

## 2021-04-26 ENCOUNTER — HOSPITAL ENCOUNTER (OUTPATIENT)
Dept: GENERAL RADIOLOGY | Facility: HOSPITAL | Age: 61
Discharge: HOME | End: 2021-04-26
Admitting: NURSE PRACTITIONER

## 2021-04-26 DIAGNOSIS — R07.89 CHEST WALL PAIN: ICD-10-CM

## 2021-04-26 DIAGNOSIS — G89.29 CHRONIC MIDLINE LOW BACK PAIN WITHOUT SCIATICA: ICD-10-CM

## 2021-04-26 DIAGNOSIS — M54.50 CHRONIC MIDLINE LOW BACK PAIN WITHOUT SCIATICA: ICD-10-CM

## 2021-04-26 DIAGNOSIS — G89.29 CHRONIC RIGHT-SIDED THORACIC BACK PAIN: ICD-10-CM

## 2021-04-26 DIAGNOSIS — M54.6 CHRONIC RIGHT-SIDED THORACIC BACK PAIN: ICD-10-CM

## 2021-04-26 DIAGNOSIS — R06.02 SHORTNESS OF BREATH: Primary | ICD-10-CM

## 2021-04-26 PROCEDURE — 72110 X-RAY EXAM L-2 SPINE 4/>VWS: CPT

## 2021-04-26 PROCEDURE — 72072 X-RAY EXAM THORAC SPINE 3VWS: CPT

## 2021-04-26 PROCEDURE — 71046 X-RAY EXAM CHEST 2 VIEWS: CPT

## 2021-04-27 ENCOUNTER — TELEPHONE (OUTPATIENT)
Dept: INTERNAL MEDICINE | Facility: CLINIC | Age: 61
End: 2021-04-27

## 2021-04-27 NOTE — PROGRESS NOTES
Lower back xray shows multilevel arthritis and degeneration of spine and bilateral hip arthritis.

## 2021-04-27 NOTE — TELEPHONE ENCOUNTER
Patient called about her colonoscopy referral. I dont see a referral for a colonoscopy. Is brittaney going to place a referral? Please give pt a call and let her know. Thank you.

## 2021-04-27 NOTE — TELEPHONE ENCOUNTER
Xray results have been sent to you. She did not get her labs done. Due to her having bowel problems I referred her to gastroenterology. They will call her with an appt. They will set up colonoscopy from there.

## 2021-04-27 NOTE — TELEPHONE ENCOUNTER
Caller: Thelma Michael    Relationship: Self    Best call back number: [phone]    What test was performed: CHEST AND BACK X RAY     When was the test performed: 04/27/2021    Where was the test performed: [facility]     Additional notes: PATIENT STATES THAT SHE WAS ADVISED TO CALL TODAY TO SEE IF HER PCP HAS RECEIVED THESE RESULTS

## 2021-04-30 ENCOUNTER — APPOINTMENT (OUTPATIENT)
Dept: CT IMAGING | Facility: HOSPITAL | Age: 61
End: 2021-04-30

## 2021-04-30 ENCOUNTER — HOSPITAL ENCOUNTER (INPATIENT)
Facility: HOSPITAL | Age: 61
LOS: 5 days | Discharge: HOME | End: 2021-05-06
Attending: EMERGENCY MEDICINE | Admitting: INTERNAL MEDICINE

## 2021-04-30 DIAGNOSIS — R10.2 PELVIC PAIN: ICD-10-CM

## 2021-04-30 DIAGNOSIS — E11.65 TYPE 2 DIABETES MELLITUS WITH HYPERGLYCEMIA, WITH LONG-TERM CURRENT USE OF INSULIN (HCC): ICD-10-CM

## 2021-04-30 DIAGNOSIS — Z79.4 TYPE 2 DIABETES MELLITUS WITH HYPERGLYCEMIA, WITH LONG-TERM CURRENT USE OF INSULIN (HCC): ICD-10-CM

## 2021-04-30 DIAGNOSIS — Z79.4 CONTROLLED TYPE 2 DIABETES MELLITUS WITH HYPERGLYCEMIA, WITH LONG-TERM CURRENT USE OF INSULIN (HCC): ICD-10-CM

## 2021-04-30 DIAGNOSIS — B37.49 YEAST UTI: Primary | ICD-10-CM

## 2021-04-30 DIAGNOSIS — E11.65 CONTROLLED TYPE 2 DIABETES MELLITUS WITH HYPERGLYCEMIA, WITH LONG-TERM CURRENT USE OF INSULIN (HCC): ICD-10-CM

## 2021-04-30 DIAGNOSIS — R11.2 INTRACTABLE NAUSEA AND VOMITING: ICD-10-CM

## 2021-04-30 DIAGNOSIS — M81.0 OSTEOPOROSIS, UNSPECIFIED OSTEOPOROSIS TYPE, UNSPECIFIED PATHOLOGICAL FRACTURE PRESENCE: ICD-10-CM

## 2021-04-30 DIAGNOSIS — N30.90 CYSTITIS: ICD-10-CM

## 2021-04-30 DIAGNOSIS — R42 VERTIGO: ICD-10-CM

## 2021-04-30 DIAGNOSIS — G45.9 TIA (TRANSIENT ISCHEMIC ATTACK): ICD-10-CM

## 2021-04-30 DIAGNOSIS — M54.50 LOW BACK PAIN, UNSPECIFIED BACK PAIN LATERALITY, UNSPECIFIED CHRONICITY, UNSPECIFIED WHETHER SCIATICA PRESENT: ICD-10-CM

## 2021-04-30 DIAGNOSIS — I10 ELEVATED BLOOD PRESSURE READING WITH DIAGNOSIS OF HYPERTENSION: ICD-10-CM

## 2021-04-30 LAB
ALBUMIN SERPL-MCNC: 4.1 G/DL (ref 3.5–5.2)
ALBUMIN/GLOB SERPL: 1.2 G/DL
ALP SERPL-CCNC: 116 U/L (ref 39–117)
ALT SERPL W P-5'-P-CCNC: 11 U/L (ref 1–33)
ANION GAP SERPL CALCULATED.3IONS-SCNC: 27 MMOL/L (ref 5–15)
AST SERPL-CCNC: 13 U/L (ref 1–32)
BACTERIA UR QL AUTO: ABNORMAL /HPF
BASOPHILS # BLD AUTO: 0.01 10*3/MM3 (ref 0–0.2)
BASOPHILS NFR BLD AUTO: 0.1 % (ref 0–1.5)
BILIRUB SERPL-MCNC: 0.7 MG/DL (ref 0–1.2)
BILIRUB UR QL STRIP: NEGATIVE
BUN SERPL-MCNC: 41 MG/DL (ref 8–23)
BUN/CREAT SERPL: 28.3 (ref 7–25)
CALCIUM SPEC-SCNC: 10 MG/DL (ref 8.6–10.5)
CHLORIDE SERPL-SCNC: 90 MMOL/L (ref 98–107)
CLARITY UR: ABNORMAL
CO2 SERPL-SCNC: 19 MMOL/L (ref 22–29)
COLOR UR: YELLOW
CREAT SERPL-MCNC: 1.45 MG/DL (ref 0.57–1)
D-LACTATE SERPL-SCNC: 1.6 MMOL/L (ref 0.5–2)
DEPRECATED RDW RBC AUTO: 40.2 FL (ref 37–54)
EOSINOPHIL # BLD AUTO: 0 10*3/MM3 (ref 0–0.4)
EOSINOPHIL NFR BLD AUTO: 0 % (ref 0.3–6.2)
ERYTHROCYTE [DISTWIDTH] IN BLOOD BY AUTOMATED COUNT: 12.5 % (ref 12.3–15.4)
GFR SERPL CREATININE-BSD FRML MDRD: 44 ML/MIN/1.73
GLOBULIN UR ELPH-MCNC: 3.3 GM/DL
GLUCOSE SERPL-MCNC: 390 MG/DL (ref 65–99)
GLUCOSE UR STRIP-MCNC: ABNORMAL MG/DL
HCT VFR BLD AUTO: 41.4 % (ref 34–46.6)
HGB BLD-MCNC: 14.2 G/DL (ref 12–15.9)
HGB UR QL STRIP.AUTO: ABNORMAL
HOLD SPECIMEN: NORMAL
HOLD SPECIMEN: NORMAL
HYALINE CASTS UR QL AUTO: ABNORMAL /LPF
IMM GRANULOCYTES # BLD AUTO: 0.07 10*3/MM3 (ref 0–0.05)
IMM GRANULOCYTES NFR BLD AUTO: 0.4 % (ref 0–0.5)
KETONES UR QL STRIP: ABNORMAL
LEUKOCYTE ESTERASE UR QL STRIP.AUTO: ABNORMAL
LIPASE SERPL-CCNC: 19 U/L (ref 13–60)
LYMPHOCYTES # BLD AUTO: 1.48 10*3/MM3 (ref 0.7–3.1)
LYMPHOCYTES NFR BLD AUTO: 8.8 % (ref 19.6–45.3)
MCH RBC QN AUTO: 30 PG (ref 26.6–33)
MCHC RBC AUTO-ENTMCNC: 34.3 G/DL (ref 31.5–35.7)
MCV RBC AUTO: 87.5 FL (ref 79–97)
MONOCYTES # BLD AUTO: 0.78 10*3/MM3 (ref 0.1–0.9)
MONOCYTES NFR BLD AUTO: 4.6 % (ref 5–12)
NEUTROPHILS NFR BLD AUTO: 14.55 10*3/MM3 (ref 1.7–7)
NEUTROPHILS NFR BLD AUTO: 86.1 % (ref 42.7–76)
NITRITE UR QL STRIP: NEGATIVE
NRBC BLD AUTO-RTO: 0 /100 WBC (ref 0–0.2)
PH UR STRIP.AUTO: 6 [PH] (ref 5–8)
PLATELET # BLD AUTO: 372 10*3/MM3 (ref 140–450)
PMV BLD AUTO: 10.8 FL (ref 6–12)
POTASSIUM SERPL-SCNC: 3.9 MMOL/L (ref 3.5–5.2)
PROCALCITONIN SERPL-MCNC: 0.2 NG/ML (ref 0–0.25)
PROT SERPL-MCNC: 7.4 G/DL (ref 6–8.5)
PROT UR QL STRIP: ABNORMAL
RBC # BLD AUTO: 4.73 10*6/MM3 (ref 3.77–5.28)
RBC # UR: ABNORMAL /HPF
REF LAB TEST METHOD: ABNORMAL
SODIUM SERPL-SCNC: 136 MMOL/L (ref 136–145)
SP GR UR STRIP: 1.02 (ref 1–1.03)
SQUAMOUS #/AREA URNS HPF: ABNORMAL /HPF
UROBILINOGEN UR QL STRIP: ABNORMAL
WBC # BLD AUTO: 16.89 10*3/MM3 (ref 3.4–10.8)
WBC UR QL AUTO: ABNORMAL /HPF
WHOLE BLOOD HOLD SPECIMEN: NORMAL
WHOLE BLOOD HOLD SPECIMEN: NORMAL
YEAST URNS QL MICRO: ABNORMAL /HPF

## 2021-04-30 PROCEDURE — 80306 DRUG TEST PRSMV INSTRMNT: CPT | Performed by: INTERNAL MEDICINE

## 2021-04-30 PROCEDURE — 74176 CT ABD & PELVIS W/O CONTRAST: CPT

## 2021-04-30 PROCEDURE — 87086 URINE CULTURE/COLONY COUNT: CPT | Performed by: PHYSICIAN ASSISTANT

## 2021-04-30 PROCEDURE — 85025 COMPLETE CBC W/AUTO DIFF WBC: CPT

## 2021-04-30 PROCEDURE — 80053 COMPREHEN METABOLIC PANEL: CPT

## 2021-04-30 PROCEDURE — 83605 ASSAY OF LACTIC ACID: CPT

## 2021-04-30 PROCEDURE — 84145 PROCALCITONIN (PCT): CPT | Performed by: EMERGENCY MEDICINE

## 2021-04-30 PROCEDURE — 25010000002 ONDANSETRON PER 1 MG: Performed by: EMERGENCY MEDICINE

## 2021-04-30 PROCEDURE — 36415 COLL VENOUS BLD VENIPUNCTURE: CPT

## 2021-04-30 PROCEDURE — 82010 KETONE BODYS QUAN: CPT | Performed by: EMERGENCY MEDICINE

## 2021-04-30 PROCEDURE — 83690 ASSAY OF LIPASE: CPT

## 2021-04-30 PROCEDURE — 81001 URINALYSIS AUTO W/SCOPE: CPT

## 2021-04-30 PROCEDURE — 99285 EMERGENCY DEPT VISIT HI MDM: CPT

## 2021-04-30 PROCEDURE — 25010000002 FLUCONAZOLE PER 200 MG: Performed by: EMERGENCY MEDICINE

## 2021-04-30 RX ORDER — FLUCONAZOLE 2 MG/ML
200 INJECTION, SOLUTION INTRAVENOUS ONCE
Status: COMPLETED | OUTPATIENT
Start: 2021-04-30 | End: 2021-05-01

## 2021-04-30 RX ORDER — ONDANSETRON 2 MG/ML
4 INJECTION INTRAMUSCULAR; INTRAVENOUS ONCE
Status: COMPLETED | OUTPATIENT
Start: 2021-04-30 | End: 2021-04-30

## 2021-04-30 RX ORDER — LABETALOL HYDROCHLORIDE 5 MG/ML
20 INJECTION, SOLUTION INTRAVENOUS ONCE
Status: COMPLETED | OUTPATIENT
Start: 2021-04-30 | End: 2021-04-30

## 2021-04-30 RX ORDER — SODIUM CHLORIDE 9 MG/ML
10 INJECTION INTRAVENOUS AS NEEDED
Status: DISCONTINUED | OUTPATIENT
Start: 2021-04-30 | End: 2021-05-03

## 2021-04-30 RX ORDER — METOPROLOL TARTRATE 5 MG/5ML
5 INJECTION INTRAVENOUS ONCE
Status: COMPLETED | OUTPATIENT
Start: 2021-04-30 | End: 2021-04-30

## 2021-04-30 RX ADMIN — FLUCONAZOLE 200 MG: 2 INJECTION, SOLUTION INTRAVENOUS at 23:04

## 2021-04-30 RX ADMIN — SODIUM CHLORIDE, POTASSIUM CHLORIDE, SODIUM LACTATE AND CALCIUM CHLORIDE 2000 ML: 600; 310; 30; 20 INJECTION, SOLUTION INTRAVENOUS at 20:07

## 2021-04-30 RX ADMIN — METOPROLOL TARTRATE 5 MG: 5 INJECTION INTRAVENOUS at 20:12

## 2021-04-30 RX ADMIN — ONDANSETRON 4 MG: 2 INJECTION INTRAMUSCULAR; INTRAVENOUS at 23:20

## 2021-04-30 RX ADMIN — ONDANSETRON 4 MG: 2 INJECTION INTRAMUSCULAR; INTRAVENOUS at 20:08

## 2021-04-30 RX ADMIN — LABETALOL 20 MG/4 ML (5 MG/ML) INTRAVENOUS SYRINGE 20 MG: at 21:31

## 2021-05-01 ENCOUNTER — APPOINTMENT (OUTPATIENT)
Dept: GENERAL RADIOLOGY | Facility: HOSPITAL | Age: 61
End: 2021-05-01

## 2021-05-01 PROBLEM — I16.0 HYPERTENSIVE URGENCY: Status: ACTIVE | Noted: 2021-05-01

## 2021-05-01 PROBLEM — N39.0 ACUTE UTI (URINARY TRACT INFECTION): Status: ACTIVE | Noted: 2021-05-01

## 2021-05-01 PROBLEM — E11.10 DKA (DIABETIC KETOACIDOSES): Status: ACTIVE | Noted: 2021-05-01

## 2021-05-01 PROBLEM — R11.2 NAUSEA & VOMITING: Status: ACTIVE | Noted: 2021-05-01

## 2021-05-01 PROBLEM — N17.9 AKI (ACUTE KIDNEY INJURY) (HCC): Status: ACTIVE | Noted: 2021-05-01

## 2021-05-01 PROBLEM — E43 SEVERE MALNUTRITION: Status: ACTIVE | Noted: 2021-05-01

## 2021-05-01 LAB
ALBUMIN SERPL-MCNC: 3.4 G/DL (ref 3.5–5.2)
ALBUMIN/GLOB SERPL: 1.5 G/DL
ALP SERPL-CCNC: 84 U/L (ref 39–117)
ALT SERPL W P-5'-P-CCNC: 8 U/L (ref 1–33)
AMPHET+METHAMPHET UR QL: NEGATIVE
AMPHETAMINES UR QL: NEGATIVE
ANION GAP SERPL CALCULATED.3IONS-SCNC: 12 MMOL/L (ref 5–15)
ANION GAP SERPL CALCULATED.3IONS-SCNC: 14 MMOL/L (ref 5–15)
ANION GAP SERPL CALCULATED.3IONS-SCNC: 15 MMOL/L (ref 5–15)
AST SERPL-CCNC: 13 U/L (ref 1–32)
ATMOSPHERIC PRESS: NORMAL MM[HG]
B-OH-BUTYR SERPL-SCNC: 5.74 MMOL/L (ref 0.02–0.27)
BARBITURATES UR QL SCN: NEGATIVE
BASE EXCESS BLDV CALC-SCNC: 1.5 MMOL/L (ref -2–2)
BASOPHILS # BLD AUTO: 0.01 10*3/MM3 (ref 0–0.2)
BASOPHILS NFR BLD AUTO: 0.1 % (ref 0–1.5)
BDY SITE: NORMAL
BENZODIAZ UR QL SCN: NEGATIVE
BILIRUB SERPL-MCNC: 0.6 MG/DL (ref 0–1.2)
BODY TEMPERATURE: 37 C
BUN SERPL-MCNC: 23 MG/DL (ref 8–23)
BUN SERPL-MCNC: 27 MG/DL (ref 8–23)
BUN SERPL-MCNC: 27 MG/DL (ref 8–23)
BUN/CREAT SERPL: 22.5 (ref 7–25)
BUN/CREAT SERPL: 24.3 (ref 7–25)
BUN/CREAT SERPL: 24.8 (ref 7–25)
BUPRENORPHINE SERPL-MCNC: NEGATIVE NG/ML
CALCIUM SPEC-SCNC: 7.8 MG/DL (ref 8.6–10.5)
CALCIUM SPEC-SCNC: 8.1 MG/DL (ref 8.6–10.5)
CALCIUM SPEC-SCNC: 9 MG/DL (ref 8.6–10.5)
CANNABINOIDS SERPL QL: POSITIVE
CHLORIDE SERPL-SCNC: 104 MMOL/L (ref 98–107)
CHLORIDE SERPL-SCNC: 108 MMOL/L (ref 98–107)
CHLORIDE SERPL-SCNC: 109 MMOL/L (ref 98–107)
CO2 BLDA-SCNC: 28.1 MMOL/L (ref 22–33)
CO2 SERPL-SCNC: 23 MMOL/L (ref 22–29)
CO2 SERPL-SCNC: 24 MMOL/L (ref 22–29)
CO2 SERPL-SCNC: 25 MMOL/L (ref 22–29)
COCAINE UR QL: NEGATIVE
COHGB MFR BLD: 0.9 %
CREAT SERPL-MCNC: 1.02 MG/DL (ref 0.57–1)
CREAT SERPL-MCNC: 1.09 MG/DL (ref 0.57–1)
CREAT SERPL-MCNC: 1.11 MG/DL (ref 0.57–1)
DEPRECATED RDW RBC AUTO: 41.1 FL (ref 37–54)
EOSINOPHIL # BLD AUTO: 0.01 10*3/MM3 (ref 0–0.4)
EOSINOPHIL NFR BLD AUTO: 0.1 % (ref 0.3–6.2)
EPAP: 0
ERYTHROCYTE [DISTWIDTH] IN BLOOD BY AUTOMATED COUNT: 12.5 % (ref 12.3–15.4)
FLUAV RNA RESP QL NAA+PROBE: NOT DETECTED
FLUBV RNA RESP QL NAA+PROBE: NOT DETECTED
GFR SERPL CREATININE-BSD FRML MDRD: 60 ML/MIN/1.73
GFR SERPL CREATININE-BSD FRML MDRD: 62 ML/MIN/1.73
GFR SERPL CREATININE-BSD FRML MDRD: 67 ML/MIN/1.73
GLOBULIN UR ELPH-MCNC: 2.3 GM/DL
GLUCOSE BLDC GLUCOMTR-MCNC: 100 MG/DL (ref 70–130)
GLUCOSE BLDC GLUCOMTR-MCNC: 148 MG/DL (ref 70–130)
GLUCOSE BLDC GLUCOMTR-MCNC: 149 MG/DL (ref 70–130)
GLUCOSE BLDC GLUCOMTR-MCNC: 154 MG/DL (ref 70–130)
GLUCOSE BLDC GLUCOMTR-MCNC: 158 MG/DL (ref 70–130)
GLUCOSE BLDC GLUCOMTR-MCNC: 179 MG/DL (ref 70–130)
GLUCOSE BLDC GLUCOMTR-MCNC: 182 MG/DL (ref 70–130)
GLUCOSE BLDC GLUCOMTR-MCNC: 190 MG/DL (ref 70–130)
GLUCOSE BLDC GLUCOMTR-MCNC: 202 MG/DL (ref 70–130)
GLUCOSE BLDC GLUCOMTR-MCNC: 209 MG/DL (ref 70–130)
GLUCOSE BLDC GLUCOMTR-MCNC: 213 MG/DL (ref 70–130)
GLUCOSE BLDC GLUCOMTR-MCNC: 217 MG/DL (ref 70–130)
GLUCOSE BLDC GLUCOMTR-MCNC: 248 MG/DL (ref 70–130)
GLUCOSE BLDC GLUCOMTR-MCNC: 270 MG/DL (ref 70–130)
GLUCOSE BLDC GLUCOMTR-MCNC: 309 MG/DL (ref 70–130)
GLUCOSE BLDC GLUCOMTR-MCNC: 35 MG/DL (ref 70–130)
GLUCOSE BLDC GLUCOMTR-MCNC: 37 MG/DL (ref 70–130)
GLUCOSE BLDC GLUCOMTR-MCNC: 41 MG/DL (ref 70–130)
GLUCOSE BLDC GLUCOMTR-MCNC: 54 MG/DL (ref 70–130)
GLUCOSE BLDC GLUCOMTR-MCNC: 93 MG/DL (ref 70–130)
GLUCOSE SERPL-MCNC: 167 MG/DL (ref 65–99)
GLUCOSE SERPL-MCNC: 169 MG/DL (ref 65–99)
GLUCOSE SERPL-MCNC: 51 MG/DL (ref 65–99)
HBA1C MFR BLD: 11.4 % (ref 4.8–5.6)
HCO3 BLDV-SCNC: 26.8 MMOL/L (ref 22–28)
HCT VFR BLD AUTO: 35.7 % (ref 34–46.6)
HGB BLD-MCNC: 12.2 G/DL (ref 12–15.9)
HGB BLDA-MCNC: 14.8 G/DL (ref 14–18)
HOLD SPECIMEN: NORMAL
IMM GRANULOCYTES # BLD AUTO: 0.05 10*3/MM3 (ref 0–0.05)
IMM GRANULOCYTES NFR BLD AUTO: 0.4 % (ref 0–0.5)
INHALED O2 CONCENTRATION: 21 %
IPAP: 0
LYMPHOCYTES # BLD AUTO: 1.75 10*3/MM3 (ref 0.7–3.1)
LYMPHOCYTES NFR BLD AUTO: 14.6 % (ref 19.6–45.3)
MAGNESIUM SERPL-MCNC: 1.6 MG/DL (ref 1.6–2.4)
MAGNESIUM SERPL-MCNC: 1.7 MG/DL (ref 1.6–2.4)
MCH RBC QN AUTO: 30.9 PG (ref 26.6–33)
MCHC RBC AUTO-ENTMCNC: 34.2 G/DL (ref 31.5–35.7)
MCV RBC AUTO: 90.4 FL (ref 79–97)
METHADONE UR QL SCN: NEGATIVE
METHGB BLD QL: 0.5 %
MODALITY: NORMAL
MONOCYTES # BLD AUTO: 1.05 10*3/MM3 (ref 0.1–0.9)
MONOCYTES NFR BLD AUTO: 8.8 % (ref 5–12)
NEUTROPHILS NFR BLD AUTO: 76 % (ref 42.7–76)
NEUTROPHILS NFR BLD AUTO: 9.13 10*3/MM3 (ref 1.7–7)
NOTE: NORMAL
NRBC BLD AUTO-RTO: 0 /100 WBC (ref 0–0.2)
OPIATES UR QL: NEGATIVE
OSMOLALITY SERPL: 307 MOSM/KG (ref 275–295)
OXYCODONE UR QL SCN: NEGATIVE
OXYHGB MFR BLDV: 68.3 %
PAW @ PEAK INSP FLOW SETTING VENT: 0 CMH2O
PCO2 BLDV: 43.4 MM HG (ref 41–51)
PCP UR QL SCN: NEGATIVE
PH BLDV: 7.4 PH UNITS (ref 7.31–7.41)
PHOSPHATE SERPL-MCNC: 2.5 MG/DL (ref 2.5–4.5)
PHOSPHATE SERPL-MCNC: 2.6 MG/DL (ref 2.5–4.5)
PLATELET # BLD AUTO: 287 10*3/MM3 (ref 140–450)
PMV BLD AUTO: 11.3 FL (ref 6–12)
PO2 BLDV: 37.5 MM HG (ref 27–53)
POTASSIUM SERPL-SCNC: 2.7 MMOL/L (ref 3.5–5.2)
POTASSIUM SERPL-SCNC: 3.8 MMOL/L (ref 3.5–5.2)
POTASSIUM SERPL-SCNC: 4.4 MMOL/L (ref 3.5–5.2)
PROPOXYPH UR QL: NEGATIVE
PROT SERPL-MCNC: 5.7 G/DL (ref 6–8.5)
RBC # BLD AUTO: 3.95 10*6/MM3 (ref 3.77–5.28)
SARS-COV-2 RNA RESP QL NAA+PROBE: NOT DETECTED
SODIUM SERPL-SCNC: 142 MMOL/L (ref 136–145)
SODIUM SERPL-SCNC: 146 MMOL/L (ref 136–145)
SODIUM SERPL-SCNC: 146 MMOL/L (ref 136–145)
TOTAL RATE: 0 BREATHS/MINUTE
TRICYCLICS UR QL SCN: NEGATIVE
TROPONIN T SERPL-MCNC: <0.01 NG/ML (ref 0–0.03)
WBC # BLD AUTO: 12 10*3/MM3 (ref 3.4–10.8)

## 2021-05-01 PROCEDURE — 99223 1ST HOSP IP/OBS HIGH 75: CPT | Performed by: INTERNAL MEDICINE

## 2021-05-01 PROCEDURE — 83036 HEMOGLOBIN GLYCOSYLATED A1C: CPT | Performed by: PHYSICIAN ASSISTANT

## 2021-05-01 PROCEDURE — 63710000001 INSULIN DETEMIR PER 5 UNITS: Performed by: HOSPITALIST

## 2021-05-01 PROCEDURE — 36415 COLL VENOUS BLD VENIPUNCTURE: CPT

## 2021-05-01 PROCEDURE — 82962 GLUCOSE BLOOD TEST: CPT

## 2021-05-01 PROCEDURE — 93005 ELECTROCARDIOGRAM TRACING: CPT | Performed by: INTERNAL MEDICINE

## 2021-05-01 PROCEDURE — 63710000001 INSULIN REGULAR HUMAN PER 5 UNITS: Performed by: EMERGENCY MEDICINE

## 2021-05-01 PROCEDURE — 87636 SARSCOV2 & INF A&B AMP PRB: CPT | Performed by: INTERNAL MEDICINE

## 2021-05-01 PROCEDURE — 84100 ASSAY OF PHOSPHORUS: CPT | Performed by: PHYSICIAN ASSISTANT

## 2021-05-01 PROCEDURE — 63710000001 INSULIN LISPRO (HUMAN) PER 5 UNITS: Performed by: HOSPITALIST

## 2021-05-01 PROCEDURE — 25010000002 DROPERIDOL PER 5 MG: Performed by: EMERGENCY MEDICINE

## 2021-05-01 PROCEDURE — 82820 HEMOGLOBIN-OXYGEN AFFINITY: CPT

## 2021-05-01 PROCEDURE — 25010000002 ONDANSETRON PER 1 MG: Performed by: PHYSICIAN ASSISTANT

## 2021-05-01 PROCEDURE — 83930 ASSAY OF BLOOD OSMOLALITY: CPT | Performed by: PHYSICIAN ASSISTANT

## 2021-05-01 PROCEDURE — 82805 BLOOD GASES W/O2 SATURATION: CPT

## 2021-05-01 PROCEDURE — 83735 ASSAY OF MAGNESIUM: CPT | Performed by: PHYSICIAN ASSISTANT

## 2021-05-01 PROCEDURE — G0378 HOSPITAL OBSERVATION PER HR: HCPCS

## 2021-05-01 PROCEDURE — 84484 ASSAY OF TROPONIN QUANT: CPT | Performed by: PHYSICIAN ASSISTANT

## 2021-05-01 PROCEDURE — 80053 COMPREHEN METABOLIC PANEL: CPT | Performed by: PHYSICIAN ASSISTANT

## 2021-05-01 PROCEDURE — 87040 BLOOD CULTURE FOR BACTERIA: CPT | Performed by: PHYSICIAN ASSISTANT

## 2021-05-01 PROCEDURE — 93005 ELECTROCARDIOGRAM TRACING: CPT | Performed by: PHYSICIAN ASSISTANT

## 2021-05-01 PROCEDURE — 85025 COMPLETE CBC W/AUTO DIFF WBC: CPT | Performed by: PHYSICIAN ASSISTANT

## 2021-05-01 PROCEDURE — 25010000002 HEPARIN (PORCINE) PER 1000 UNITS: Performed by: PHYSICIAN ASSISTANT

## 2021-05-01 PROCEDURE — 25010000002 CEFTRIAXONE PER 250 MG: Performed by: HOSPITALIST

## 2021-05-01 PROCEDURE — 71045 X-RAY EXAM CHEST 1 VIEW: CPT

## 2021-05-01 PROCEDURE — 25010000003 MAGNESIUM SULFATE 4 GM/100ML SOLUTION: Performed by: HOSPITALIST

## 2021-05-01 RX ORDER — POTASSIUM CHLORIDE 750 MG/1
40 CAPSULE, EXTENDED RELEASE ORAL AS NEEDED
Status: DISCONTINUED | OUTPATIENT
Start: 2021-05-01 | End: 2021-05-06 | Stop reason: HOSPADM

## 2021-05-01 RX ORDER — SODIUM CHLORIDE AND POTASSIUM CHLORIDE 300; 900 MG/100ML; MG/100ML
250 INJECTION, SOLUTION INTRAVENOUS CONTINUOUS PRN
Status: DISCONTINUED | OUTPATIENT
Start: 2021-05-01 | End: 2021-05-01

## 2021-05-01 RX ORDER — TERAZOSIN 2 MG/1
2 CAPSULE ORAL NIGHTLY
Status: DISCONTINUED | OUTPATIENT
Start: 2021-05-01 | End: 2021-05-03

## 2021-05-01 RX ORDER — NICOTINE POLACRILEX 4 MG
15 LOZENGE BUCCAL
Status: DISCONTINUED | OUTPATIENT
Start: 2021-05-01 | End: 2021-05-06 | Stop reason: HOSPADM

## 2021-05-01 RX ORDER — SODIUM CHLORIDE 9 MG/ML
250 INJECTION, SOLUTION INTRAVENOUS CONTINUOUS PRN
Status: DISCONTINUED | OUTPATIENT
Start: 2021-05-01 | End: 2021-05-01

## 2021-05-01 RX ORDER — ACETAMINOPHEN 325 MG/1
650 TABLET ORAL EVERY 4 HOURS PRN
Status: DISCONTINUED | OUTPATIENT
Start: 2021-05-01 | End: 2021-05-06 | Stop reason: HOSPADM

## 2021-05-01 RX ORDER — TRAMADOL HYDROCHLORIDE 50 MG/1
50 TABLET ORAL EVERY 6 HOURS PRN
Status: DISCONTINUED | OUTPATIENT
Start: 2021-05-01 | End: 2021-05-06 | Stop reason: HOSPADM

## 2021-05-01 RX ORDER — DEXTROSE MONOHYDRATE 25 G/50ML
25 INJECTION, SOLUTION INTRAVENOUS
Status: DISCONTINUED | OUTPATIENT
Start: 2021-05-01 | End: 2021-05-06 | Stop reason: HOSPADM

## 2021-05-01 RX ORDER — DEXTROSE, SODIUM CHLORIDE, AND POTASSIUM CHLORIDE 5; .9; .15 G/100ML; G/100ML; G/100ML
150 INJECTION INTRAVENOUS CONTINUOUS PRN
Status: DISCONTINUED | OUTPATIENT
Start: 2021-05-01 | End: 2021-05-01

## 2021-05-01 RX ORDER — ATORVASTATIN CALCIUM 40 MG/1
80 TABLET, FILM COATED ORAL NIGHTLY
Status: DISCONTINUED | OUTPATIENT
Start: 2021-05-01 | End: 2021-05-06 | Stop reason: HOSPADM

## 2021-05-01 RX ORDER — DEXTROSE, SODIUM CHLORIDE, AND POTASSIUM CHLORIDE 5; .45; .15 G/100ML; G/100ML; G/100ML
150 INJECTION INTRAVENOUS CONTINUOUS PRN
Status: DISCONTINUED | OUTPATIENT
Start: 2021-05-01 | End: 2021-05-01

## 2021-05-01 RX ORDER — SODIUM CHLORIDE AND POTASSIUM CHLORIDE 150; 900 MG/100ML; MG/100ML
250 INJECTION, SOLUTION INTRAVENOUS CONTINUOUS PRN
Status: DISCONTINUED | OUTPATIENT
Start: 2021-05-01 | End: 2021-05-01

## 2021-05-01 RX ORDER — SODIUM CHLORIDE 450 MG/100ML
250 INJECTION, SOLUTION INTRAVENOUS CONTINUOUS PRN
Status: DISCONTINUED | OUTPATIENT
Start: 2021-05-01 | End: 2021-05-01

## 2021-05-01 RX ORDER — POTASSIUM CHLORIDE, DEXTROSE MONOHYDRATE AND SODIUM CHLORIDE 300; 5; 900 MG/100ML; G/100ML; MG/100ML
150 INJECTION, SOLUTION INTRAVENOUS CONTINUOUS PRN
Status: DISCONTINUED | OUTPATIENT
Start: 2021-05-01 | End: 2021-05-01

## 2021-05-01 RX ORDER — AMLODIPINE BESYLATE 10 MG/1
10 TABLET ORAL
Status: DISCONTINUED | OUTPATIENT
Start: 2021-05-01 | End: 2021-05-06 | Stop reason: HOSPADM

## 2021-05-01 RX ORDER — SODIUM CHLORIDE 9 MG/ML
10 INJECTION, SOLUTION INTRAVENOUS CONTINUOUS PRN
Status: DISCONTINUED | OUTPATIENT
Start: 2021-05-01 | End: 2021-05-01

## 2021-05-01 RX ORDER — TERAZOSIN 1 MG/1
1 CAPSULE ORAL NIGHTLY
Status: DISCONTINUED | OUTPATIENT
Start: 2021-05-01 | End: 2021-05-01

## 2021-05-01 RX ORDER — MAGNESIUM SULFATE HEPTAHYDRATE 40 MG/ML
4 INJECTION, SOLUTION INTRAVENOUS AS NEEDED
Status: DISCONTINUED | OUTPATIENT
Start: 2021-05-01 | End: 2021-05-06 | Stop reason: HOSPADM

## 2021-05-01 RX ORDER — DEXTROSE, SODIUM CHLORIDE, AND POTASSIUM CHLORIDE 5; .45; .3 G/100ML; G/100ML; G/100ML
150 INJECTION INTRAVENOUS CONTINUOUS PRN
Status: DISCONTINUED | OUTPATIENT
Start: 2021-05-01 | End: 2021-05-01

## 2021-05-01 RX ORDER — FERROUS SULFATE 325(65) MG
325 TABLET ORAL
Status: DISCONTINUED | OUTPATIENT
Start: 2021-05-01 | End: 2021-05-06 | Stop reason: HOSPADM

## 2021-05-01 RX ORDER — FLUOXETINE HYDROCHLORIDE 20 MG/1
20 CAPSULE ORAL DAILY
Status: DISCONTINUED | OUTPATIENT
Start: 2021-05-01 | End: 2021-05-06 | Stop reason: HOSPADM

## 2021-05-01 RX ORDER — DEXTROSE AND SODIUM CHLORIDE 5; .45 G/100ML; G/100ML
150 INJECTION, SOLUTION INTRAVENOUS CONTINUOUS PRN
Status: DISCONTINUED | OUTPATIENT
Start: 2021-05-01 | End: 2021-05-01

## 2021-05-01 RX ORDER — CHOLECALCIFEROL (VITAMIN D3) 125 MCG
5 CAPSULE ORAL NIGHTLY PRN
Status: DISCONTINUED | OUTPATIENT
Start: 2021-05-01 | End: 2021-05-06 | Stop reason: HOSPADM

## 2021-05-01 RX ORDER — SODIUM CHLORIDE 0.9 % (FLUSH) 0.9 %
10 SYRINGE (ML) INJECTION EVERY 12 HOURS SCHEDULED
Status: DISCONTINUED | OUTPATIENT
Start: 2021-05-01 | End: 2021-05-06 | Stop reason: HOSPADM

## 2021-05-01 RX ORDER — DROPERIDOL 2.5 MG/ML
2.5 INJECTION, SOLUTION INTRAMUSCULAR; INTRAVENOUS ONCE
Status: COMPLETED | OUTPATIENT
Start: 2021-05-01 | End: 2021-05-01

## 2021-05-01 RX ORDER — DEXTROSE AND SODIUM CHLORIDE 5; .9 G/100ML; G/100ML
150 INJECTION, SOLUTION INTRAVENOUS CONTINUOUS PRN
Status: DISCONTINUED | OUTPATIENT
Start: 2021-05-01 | End: 2021-05-01

## 2021-05-01 RX ORDER — SODIUM CHLORIDE 0.9 % (FLUSH) 0.9 %
10 SYRINGE (ML) INJECTION AS NEEDED
Status: DISCONTINUED | OUTPATIENT
Start: 2021-05-01 | End: 2021-05-01

## 2021-05-01 RX ORDER — HEPARIN SODIUM 5000 [USP'U]/ML
5000 INJECTION, SOLUTION INTRAVENOUS; SUBCUTANEOUS EVERY 8 HOURS SCHEDULED
Status: DISCONTINUED | OUTPATIENT
Start: 2021-05-01 | End: 2021-05-06 | Stop reason: HOSPADM

## 2021-05-01 RX ORDER — MAGNESIUM SULFATE HEPTAHYDRATE 40 MG/ML
2 INJECTION, SOLUTION INTRAVENOUS AS NEEDED
Status: DISCONTINUED | OUTPATIENT
Start: 2021-05-01 | End: 2021-05-06 | Stop reason: HOSPADM

## 2021-05-01 RX ORDER — ASPIRIN 81 MG/1
81 TABLET ORAL DAILY
Status: DISCONTINUED | OUTPATIENT
Start: 2021-05-01 | End: 2021-05-06 | Stop reason: HOSPADM

## 2021-05-01 RX ORDER — POTASSIUM CHLORIDE 7.45 MG/ML
10 INJECTION INTRAVENOUS
Status: DISCONTINUED | OUTPATIENT
Start: 2021-05-01 | End: 2021-05-06 | Stop reason: HOSPADM

## 2021-05-01 RX ORDER — SODIUM CHLORIDE 0.9 % (FLUSH) 0.9 %
10 SYRINGE (ML) INJECTION ONCE AS NEEDED
Status: DISCONTINUED | OUTPATIENT
Start: 2021-05-01 | End: 2021-05-01

## 2021-05-01 RX ORDER — GABAPENTIN 300 MG/1
300 CAPSULE ORAL 3 TIMES DAILY
Status: DISCONTINUED | OUTPATIENT
Start: 2021-05-01 | End: 2021-05-06 | Stop reason: HOSPADM

## 2021-05-01 RX ORDER — POTASSIUM CHLORIDE 1.5 G/1.77G
40 POWDER, FOR SOLUTION ORAL AS NEEDED
Status: DISCONTINUED | OUTPATIENT
Start: 2021-05-01 | End: 2021-05-06 | Stop reason: HOSPADM

## 2021-05-01 RX ORDER — SODIUM CHLORIDE 0.9 % (FLUSH) 0.9 %
3 SYRINGE (ML) INJECTION EVERY 12 HOURS SCHEDULED
Status: DISCONTINUED | OUTPATIENT
Start: 2021-05-01 | End: 2021-05-01

## 2021-05-01 RX ORDER — POLYETHYLENE GLYCOL 3350 17 G/17G
17 POWDER, FOR SOLUTION ORAL DAILY
Status: DISCONTINUED | OUTPATIENT
Start: 2021-05-01 | End: 2021-05-06 | Stop reason: HOSPADM

## 2021-05-01 RX ORDER — SODIUM CHLORIDE AND POTASSIUM CHLORIDE 150; 450 MG/100ML; MG/100ML
250 INJECTION, SOLUTION INTRAVENOUS CONTINUOUS PRN
Status: DISCONTINUED | OUTPATIENT
Start: 2021-05-01 | End: 2021-05-01

## 2021-05-01 RX ORDER — SODIUM CHLORIDE 0.9 % (FLUSH) 0.9 %
10 SYRINGE (ML) INJECTION AS NEEDED
Status: DISCONTINUED | OUTPATIENT
Start: 2021-05-01 | End: 2021-05-06 | Stop reason: HOSPADM

## 2021-05-01 RX ORDER — PANTOPRAZOLE SODIUM 40 MG/1
40 TABLET, DELAYED RELEASE ORAL DAILY
Status: DISCONTINUED | OUTPATIENT
Start: 2021-05-01 | End: 2021-05-06 | Stop reason: HOSPADM

## 2021-05-01 RX ORDER — DEXTROSE MONOHYDRATE 25 G/50ML
12.5 INJECTION, SOLUTION INTRAVENOUS AS NEEDED
Status: DISCONTINUED | OUTPATIENT
Start: 2021-05-01 | End: 2021-05-01

## 2021-05-01 RX ORDER — HYDRALAZINE HYDROCHLORIDE 20 MG/ML
10 INJECTION INTRAMUSCULAR; INTRAVENOUS EVERY 6 HOURS PRN
Status: DISCONTINUED | OUTPATIENT
Start: 2021-05-01 | End: 2021-05-06 | Stop reason: HOSPADM

## 2021-05-01 RX ORDER — ONDANSETRON 2 MG/ML
4 INJECTION INTRAMUSCULAR; INTRAVENOUS EVERY 6 HOURS PRN
Status: DISCONTINUED | OUTPATIENT
Start: 2021-05-01 | End: 2021-05-06 | Stop reason: HOSPADM

## 2021-05-01 RX ORDER — SIMETHICONE 80 MG
80 TABLET,CHEWABLE ORAL 4 TIMES DAILY PRN
Status: DISCONTINUED | OUTPATIENT
Start: 2021-05-01 | End: 2021-05-06 | Stop reason: HOSPADM

## 2021-05-01 RX ORDER — FLUCONAZOLE 100 MG/1
100 TABLET ORAL EVERY 24 HOURS
Status: COMPLETED | OUTPATIENT
Start: 2021-05-01 | End: 2021-05-05

## 2021-05-01 RX ADMIN — INSULIN HUMAN 5 UNITS/HR: 1 INJECTION, SOLUTION INTRAVENOUS at 06:43

## 2021-05-01 RX ADMIN — METOPROLOL TARTRATE 25 MG: 25 TABLET, FILM COATED ORAL at 21:39

## 2021-05-01 RX ADMIN — FERROUS SULFATE TAB 325 MG (65 MG ELEMENTAL FE) 325 MG: 325 (65 FE) TAB at 09:16

## 2021-05-01 RX ADMIN — INSULIN HUMAN 10 UNITS: 100 INJECTION, SOLUTION PARENTERAL at 00:10

## 2021-05-01 RX ADMIN — ASPIRIN 81 MG: 81 TABLET, COATED ORAL at 09:16

## 2021-05-01 RX ADMIN — METOPROLOL TARTRATE 25 MG: 25 TABLET, FILM COATED ORAL at 14:04

## 2021-05-01 RX ADMIN — SODIUM CHLORIDE 1 G: 900 INJECTION INTRAVENOUS at 09:16

## 2021-05-01 RX ADMIN — FLUOXETINE HYDROCHLORIDE 20 MG: 20 CAPSULE ORAL at 09:16

## 2021-05-01 RX ADMIN — AMLODIPINE BESYLATE 10 MG: 10 TABLET ORAL at 09:16

## 2021-05-01 RX ADMIN — HEPARIN SODIUM 5000 UNITS: 5000 INJECTION INTRAVENOUS; SUBCUTANEOUS at 20:21

## 2021-05-01 RX ADMIN — HEPARIN SODIUM 5000 UNITS: 5000 INJECTION INTRAVENOUS; SUBCUTANEOUS at 14:04

## 2021-05-01 RX ADMIN — INSULIN DETEMIR 20 UNITS: 100 INJECTION, SOLUTION SUBCUTANEOUS at 09:29

## 2021-05-01 RX ADMIN — GABAPENTIN 300 MG: 300 CAPSULE ORAL at 09:16

## 2021-05-01 RX ADMIN — POTASSIUM CHLORIDE 40 MEQ: 750 CAPSULE, EXTENDED RELEASE ORAL at 23:37

## 2021-05-01 RX ADMIN — SODIUM CHLORIDE 250 ML/HR: 4.5 INJECTION, SOLUTION INTRAVENOUS at 06:43

## 2021-05-01 RX ADMIN — ONDANSETRON 4 MG: 2 INJECTION INTRAMUSCULAR; INTRAVENOUS at 09:29

## 2021-05-01 RX ADMIN — TERAZOSIN HYDROCHLORIDE 2 MG: 2 CAPSULE ORAL at 20:20

## 2021-05-01 RX ADMIN — POTASSIUM CHLORIDE 40 MEQ: 750 CAPSULE, EXTENDED RELEASE ORAL at 15:53

## 2021-05-01 RX ADMIN — INSULIN LISPRO 5 UNITS: 100 INJECTION, SOLUTION INTRAVENOUS; SUBCUTANEOUS at 18:41

## 2021-05-01 RX ADMIN — DROPERIDOL 2.5 MG: 2.5 INJECTION, SOLUTION INTRAMUSCULAR; INTRAVENOUS at 00:12

## 2021-05-01 RX ADMIN — SODIUM CHLORIDE 2000 ML: 9 INJECTION, SOLUTION INTRAVENOUS at 04:02

## 2021-05-01 RX ADMIN — GABAPENTIN 300 MG: 300 CAPSULE ORAL at 15:53

## 2021-05-01 RX ADMIN — SIMETHICONE 80 MG: 80 TABLET, CHEWABLE ORAL at 23:36

## 2021-05-01 RX ADMIN — ATORVASTATIN CALCIUM 80 MG: 40 TABLET, FILM COATED ORAL at 20:20

## 2021-05-01 RX ADMIN — MAGNESIUM SULFATE HEPTAHYDRATE 4 G: 40 INJECTION, SOLUTION INTRAVENOUS at 11:25

## 2021-05-01 RX ADMIN — FLUCONAZOLE 100 MG: 100 TABLET ORAL at 20:21

## 2021-05-01 RX ADMIN — POLYETHYLENE GLYCOL 3350 17 G: 17 POWDER, FOR SOLUTION ORAL at 23:37

## 2021-05-01 RX ADMIN — GABAPENTIN 300 MG: 300 CAPSULE ORAL at 20:21

## 2021-05-01 RX ADMIN — TRAMADOL HYDROCHLORIDE 50 MG: 50 TABLET, FILM COATED ORAL at 14:33

## 2021-05-01 RX ADMIN — PANTOPRAZOLE SODIUM 40 MG: 40 TABLET, DELAYED RELEASE ORAL at 09:16

## 2021-05-02 LAB
ANION GAP SERPL CALCULATED.3IONS-SCNC: 9 MMOL/L (ref 5–15)
BACTERIA SPEC AEROBE CULT: NO GROWTH
BUN SERPL-MCNC: 20 MG/DL (ref 8–23)
BUN/CREAT SERPL: 14 (ref 7–25)
CALCIUM SPEC-SCNC: 8.6 MG/DL (ref 8.6–10.5)
CHLORIDE SERPL-SCNC: 103 MMOL/L (ref 98–107)
CO2 SERPL-SCNC: 25 MMOL/L (ref 22–29)
CREAT SERPL-MCNC: 1.43 MG/DL (ref 0.57–1)
DEPRECATED RDW RBC AUTO: 43.1 FL (ref 37–54)
ERYTHROCYTE [DISTWIDTH] IN BLOOD BY AUTOMATED COUNT: 12.5 % (ref 12.3–15.4)
GFR SERPL CREATININE-BSD FRML MDRD: 45 ML/MIN/1.73
GLUCOSE BLDC GLUCOMTR-MCNC: 100 MG/DL (ref 70–130)
GLUCOSE BLDC GLUCOMTR-MCNC: 106 MG/DL (ref 70–130)
GLUCOSE BLDC GLUCOMTR-MCNC: 223 MG/DL (ref 70–130)
GLUCOSE BLDC GLUCOMTR-MCNC: 254 MG/DL (ref 70–130)
GLUCOSE SERPL-MCNC: 222 MG/DL (ref 65–99)
HCT VFR BLD AUTO: 39.9 % (ref 34–46.6)
HGB BLD-MCNC: 13 G/DL (ref 12–15.9)
MAGNESIUM SERPL-MCNC: 2.6 MG/DL (ref 1.6–2.4)
MCH RBC QN AUTO: 30.2 PG (ref 26.6–33)
MCHC RBC AUTO-ENTMCNC: 32.6 G/DL (ref 31.5–35.7)
MCV RBC AUTO: 92.6 FL (ref 79–97)
PLATELET # BLD AUTO: 188 10*3/MM3 (ref 140–450)
PMV BLD AUTO: 11.2 FL (ref 6–12)
POTASSIUM SERPL-SCNC: 4.6 MMOL/L (ref 3.5–5.2)
QT INTERVAL: 384 MS
QT INTERVAL: 424 MS
QTC INTERVAL: 474 MS
QTC INTERVAL: 504 MS
RBC # BLD AUTO: 4.31 10*6/MM3 (ref 3.77–5.28)
SODIUM SERPL-SCNC: 137 MMOL/L (ref 136–145)
WBC # BLD AUTO: 8.44 10*3/MM3 (ref 3.4–10.8)

## 2021-05-02 PROCEDURE — 63710000001 INSULIN LISPRO (HUMAN) PER 5 UNITS: Performed by: HOSPITALIST

## 2021-05-02 PROCEDURE — 25010000002 HEPARIN (PORCINE) PER 1000 UNITS: Performed by: PHYSICIAN ASSISTANT

## 2021-05-02 PROCEDURE — 63710000001 INSULIN DETEMIR PER 5 UNITS: Performed by: HOSPITALIST

## 2021-05-02 PROCEDURE — 80048 BASIC METABOLIC PNL TOTAL CA: CPT | Performed by: HOSPITALIST

## 2021-05-02 PROCEDURE — 25010000002 CEFTRIAXONE PER 250 MG: Performed by: HOSPITALIST

## 2021-05-02 PROCEDURE — 99233 SBSQ HOSP IP/OBS HIGH 50: CPT | Performed by: HOSPITALIST

## 2021-05-02 PROCEDURE — 82962 GLUCOSE BLOOD TEST: CPT

## 2021-05-02 PROCEDURE — 97530 THERAPEUTIC ACTIVITIES: CPT

## 2021-05-02 PROCEDURE — 85027 COMPLETE CBC AUTOMATED: CPT | Performed by: HOSPITALIST

## 2021-05-02 PROCEDURE — G0378 HOSPITAL OBSERVATION PER HR: HCPCS

## 2021-05-02 PROCEDURE — 83735 ASSAY OF MAGNESIUM: CPT | Performed by: HOSPITALIST

## 2021-05-02 PROCEDURE — 97162 PT EVAL MOD COMPLEX 30 MIN: CPT

## 2021-05-02 RX ORDER — ALUMINA, MAGNESIA, AND SIMETHICONE 2400; 2400; 240 MG/30ML; MG/30ML; MG/30ML
15 SUSPENSION ORAL EVERY 6 HOURS PRN
Status: DISCONTINUED | OUTPATIENT
Start: 2021-05-02 | End: 2021-05-06 | Stop reason: HOSPADM

## 2021-05-02 RX ORDER — SODIUM CHLORIDE 9 MG/ML
100 INJECTION, SOLUTION INTRAVENOUS CONTINUOUS
Status: ACTIVE | OUTPATIENT
Start: 2021-05-02 | End: 2021-05-03

## 2021-05-02 RX ORDER — CALCIUM CARBONATE 200(500)MG
2 TABLET,CHEWABLE ORAL 3 TIMES DAILY PRN
Status: DISCONTINUED | OUTPATIENT
Start: 2021-05-02 | End: 2021-05-06 | Stop reason: HOSPADM

## 2021-05-02 RX ADMIN — TRAMADOL HYDROCHLORIDE 50 MG: 50 TABLET, FILM COATED ORAL at 12:26

## 2021-05-02 RX ADMIN — Medication 5 MG: at 20:05

## 2021-05-02 RX ADMIN — HEPARIN SODIUM 5000 UNITS: 5000 INJECTION INTRAVENOUS; SUBCUTANEOUS at 13:26

## 2021-05-02 RX ADMIN — TERAZOSIN HYDROCHLORIDE 2 MG: 2 CAPSULE ORAL at 20:05

## 2021-05-02 RX ADMIN — FLUCONAZOLE 100 MG: 100 TABLET ORAL at 20:04

## 2021-05-02 RX ADMIN — ASPIRIN 81 MG: 81 TABLET, COATED ORAL at 09:09

## 2021-05-02 RX ADMIN — SIMETHICONE 80 MG: 80 TABLET, CHEWABLE ORAL at 08:31

## 2021-05-02 RX ADMIN — HEPARIN SODIUM 5000 UNITS: 5000 INJECTION INTRAVENOUS; SUBCUTANEOUS at 20:05

## 2021-05-02 RX ADMIN — AMLODIPINE BESYLATE 10 MG: 10 TABLET ORAL at 09:10

## 2021-05-02 RX ADMIN — SODIUM CHLORIDE 100 ML/HR: 9 INJECTION, SOLUTION INTRAVENOUS at 13:26

## 2021-05-02 RX ADMIN — SODIUM CHLORIDE, PRESERVATIVE FREE 10 ML: 5 INJECTION INTRAVENOUS at 12:27

## 2021-05-02 RX ADMIN — METOPROLOL TARTRATE 25 MG: 25 TABLET, FILM COATED ORAL at 09:11

## 2021-05-02 RX ADMIN — INSULIN LISPRO 4 UNITS: 100 INJECTION, SOLUTION INTRAVENOUS; SUBCUTANEOUS at 12:27

## 2021-05-02 RX ADMIN — INSULIN DETEMIR 15 UNITS: 100 INJECTION, SOLUTION SUBCUTANEOUS at 09:57

## 2021-05-02 RX ADMIN — GABAPENTIN 300 MG: 300 CAPSULE ORAL at 09:10

## 2021-05-02 RX ADMIN — SIMETHICONE 80 MG: 80 TABLET, CHEWABLE ORAL at 20:05

## 2021-05-02 RX ADMIN — FERROUS SULFATE TAB 325 MG (65 MG ELEMENTAL FE) 325 MG: 325 (65 FE) TAB at 09:10

## 2021-05-02 RX ADMIN — GABAPENTIN 300 MG: 300 CAPSULE ORAL at 17:17

## 2021-05-02 RX ADMIN — SODIUM CHLORIDE 1 G: 900 INJECTION INTRAVENOUS at 09:09

## 2021-05-02 RX ADMIN — ATORVASTATIN CALCIUM 80 MG: 40 TABLET, FILM COATED ORAL at 20:05

## 2021-05-02 RX ADMIN — GABAPENTIN 300 MG: 300 CAPSULE ORAL at 20:05

## 2021-05-02 RX ADMIN — TRAMADOL HYDROCHLORIDE 50 MG: 50 TABLET, FILM COATED ORAL at 02:34

## 2021-05-02 RX ADMIN — FLUOXETINE HYDROCHLORIDE 20 MG: 20 CAPSULE ORAL at 09:10

## 2021-05-02 RX ADMIN — POLYETHYLENE GLYCOL 3350 17 G: 17 POWDER, FOR SOLUTION ORAL at 09:09

## 2021-05-02 RX ADMIN — PANTOPRAZOLE SODIUM 40 MG: 40 TABLET, DELAYED RELEASE ORAL at 09:10

## 2021-05-02 RX ADMIN — INSULIN LISPRO 3 UNITS: 100 INJECTION, SOLUTION INTRAVENOUS; SUBCUTANEOUS at 09:07

## 2021-05-02 RX ADMIN — SODIUM CHLORIDE, PRESERVATIVE FREE 10 ML: 5 INJECTION INTRAVENOUS at 20:05

## 2021-05-03 LAB
ANION GAP SERPL CALCULATED.3IONS-SCNC: 11 MMOL/L (ref 5–15)
BUN SERPL-MCNC: 16 MG/DL (ref 8–23)
BUN/CREAT SERPL: 14.2 (ref 7–25)
CALCIUM SPEC-SCNC: 8.3 MG/DL (ref 8.6–10.5)
CHLORIDE SERPL-SCNC: 104 MMOL/L (ref 98–107)
CO2 SERPL-SCNC: 23 MMOL/L (ref 22–29)
CREAT SERPL-MCNC: 1.13 MG/DL (ref 0.57–1)
DEPRECATED RDW RBC AUTO: 42.2 FL (ref 37–54)
ERYTHROCYTE [DISTWIDTH] IN BLOOD BY AUTOMATED COUNT: 12.7 % (ref 12.3–15.4)
GFR SERPL CREATININE-BSD FRML MDRD: 59 ML/MIN/1.73
GLUCOSE BLDC GLUCOMTR-MCNC: 121 MG/DL (ref 70–130)
GLUCOSE BLDC GLUCOMTR-MCNC: 151 MG/DL (ref 70–130)
GLUCOSE BLDC GLUCOMTR-MCNC: 152 MG/DL (ref 70–130)
GLUCOSE BLDC GLUCOMTR-MCNC: 246 MG/DL (ref 70–130)
GLUCOSE SERPL-MCNC: 184 MG/DL (ref 65–99)
HCT VFR BLD AUTO: 42.4 % (ref 34–46.6)
HGB BLD-MCNC: 14.3 G/DL (ref 12–15.9)
MCH RBC QN AUTO: 30.6 PG (ref 26.6–33)
MCHC RBC AUTO-ENTMCNC: 33.7 G/DL (ref 31.5–35.7)
MCV RBC AUTO: 90.8 FL (ref 79–97)
PLATELET # BLD AUTO: 219 10*3/MM3 (ref 140–450)
PMV BLD AUTO: 11.4 FL (ref 6–12)
POTASSIUM SERPL-SCNC: 4.2 MMOL/L (ref 3.5–5.2)
RBC # BLD AUTO: 4.67 10*6/MM3 (ref 3.77–5.28)
SODIUM SERPL-SCNC: 138 MMOL/L (ref 136–145)
WBC # BLD AUTO: 7.39 10*3/MM3 (ref 3.4–10.8)

## 2021-05-03 PROCEDURE — 25010000002 HEPARIN (PORCINE) PER 1000 UNITS: Performed by: PHYSICIAN ASSISTANT

## 2021-05-03 PROCEDURE — 63710000001 INSULIN DETEMIR PER 5 UNITS: Performed by: HOSPITALIST

## 2021-05-03 PROCEDURE — 99232 SBSQ HOSP IP/OBS MODERATE 35: CPT | Performed by: NURSE PRACTITIONER

## 2021-05-03 PROCEDURE — 85027 COMPLETE CBC AUTOMATED: CPT | Performed by: HOSPITALIST

## 2021-05-03 PROCEDURE — 80048 BASIC METABOLIC PNL TOTAL CA: CPT | Performed by: HOSPITALIST

## 2021-05-03 PROCEDURE — 63710000001 INSULIN LISPRO (HUMAN) PER 5 UNITS: Performed by: HOSPITALIST

## 2021-05-03 PROCEDURE — 82962 GLUCOSE BLOOD TEST: CPT

## 2021-05-03 PROCEDURE — 25010000002 CEFTRIAXONE PER 250 MG: Performed by: HOSPITALIST

## 2021-05-03 RX ORDER — TERAZOSIN 1 MG/1
1 CAPSULE ORAL NIGHTLY
Status: DISCONTINUED | OUTPATIENT
Start: 2021-05-03 | End: 2021-05-06 | Stop reason: HOSPADM

## 2021-05-03 RX ORDER — BISACODYL 10 MG
10 SUPPOSITORY, RECTAL RECTAL DAILY PRN
Status: DISCONTINUED | OUTPATIENT
Start: 2021-05-03 | End: 2021-05-06 | Stop reason: HOSPADM

## 2021-05-03 RX ORDER — AMOXICILLIN 250 MG
2 CAPSULE ORAL 2 TIMES DAILY PRN
Status: DISCONTINUED | OUTPATIENT
Start: 2021-05-03 | End: 2021-05-06 | Stop reason: HOSPADM

## 2021-05-03 RX ADMIN — HEPARIN SODIUM 5000 UNITS: 5000 INJECTION INTRAVENOUS; SUBCUTANEOUS at 04:36

## 2021-05-03 RX ADMIN — HEPARIN SODIUM 5000 UNITS: 5000 INJECTION INTRAVENOUS; SUBCUTANEOUS at 14:01

## 2021-05-03 RX ADMIN — HEPARIN SODIUM 5000 UNITS: 5000 INJECTION INTRAVENOUS; SUBCUTANEOUS at 19:59

## 2021-05-03 RX ADMIN — FLUOXETINE HYDROCHLORIDE 20 MG: 20 CAPSULE ORAL at 08:20

## 2021-05-03 RX ADMIN — ATORVASTATIN CALCIUM 80 MG: 40 TABLET, FILM COATED ORAL at 19:56

## 2021-05-03 RX ADMIN — TRAMADOL HYDROCHLORIDE 50 MG: 50 TABLET, FILM COATED ORAL at 15:35

## 2021-05-03 RX ADMIN — ASPIRIN 81 MG: 81 TABLET, COATED ORAL at 08:20

## 2021-05-03 RX ADMIN — AMLODIPINE BESYLATE 10 MG: 10 TABLET ORAL at 08:20

## 2021-05-03 RX ADMIN — TRAMADOL HYDROCHLORIDE 50 MG: 50 TABLET, FILM COATED ORAL at 09:22

## 2021-05-03 RX ADMIN — INSULIN LISPRO 2 UNITS: 100 INJECTION, SOLUTION INTRAVENOUS; SUBCUTANEOUS at 17:49

## 2021-05-03 RX ADMIN — SODIUM CHLORIDE, PRESERVATIVE FREE 10 ML: 5 INJECTION INTRAVENOUS at 19:58

## 2021-05-03 RX ADMIN — DOCUSATE SODIUM 50MG AND SENNOSIDES 8.6MG 2 TABLET: 8.6; 5 TABLET, FILM COATED ORAL at 20:05

## 2021-05-03 RX ADMIN — FERROUS SULFATE TAB 325 MG (65 MG ELEMENTAL FE) 325 MG: 325 (65 FE) TAB at 08:21

## 2021-05-03 RX ADMIN — PANTOPRAZOLE SODIUM 40 MG: 40 TABLET, DELAYED RELEASE ORAL at 08:20

## 2021-05-03 RX ADMIN — BISACODYL 10 MG: 10 SUPPOSITORY RECTAL at 20:05

## 2021-05-03 RX ADMIN — INSULIN LISPRO 3 UNITS: 100 INJECTION, SOLUTION INTRAVENOUS; SUBCUTANEOUS at 12:06

## 2021-05-03 RX ADMIN — GABAPENTIN 300 MG: 300 CAPSULE ORAL at 19:57

## 2021-05-03 RX ADMIN — INSULIN DETEMIR 15 UNITS: 100 INJECTION, SOLUTION SUBCUTANEOUS at 08:21

## 2021-05-03 RX ADMIN — SODIUM CHLORIDE 1 G: 900 INJECTION INTRAVENOUS at 08:21

## 2021-05-03 RX ADMIN — GABAPENTIN 300 MG: 300 CAPSULE ORAL at 15:35

## 2021-05-03 RX ADMIN — GABAPENTIN 300 MG: 300 CAPSULE ORAL at 08:20

## 2021-05-03 RX ADMIN — FLUCONAZOLE 100 MG: 100 TABLET ORAL at 19:57

## 2021-05-03 RX ADMIN — Medication 5 MG: at 19:57

## 2021-05-03 RX ADMIN — TERAZOSIN HYDROCHLORIDE 1 MG: 1 CAPSULE ORAL at 19:56

## 2021-05-04 LAB
GLUCOSE BLDC GLUCOMTR-MCNC: 129 MG/DL (ref 70–130)
GLUCOSE BLDC GLUCOMTR-MCNC: 218 MG/DL (ref 70–130)
GLUCOSE BLDC GLUCOMTR-MCNC: 397 MG/DL (ref 70–130)
GLUCOSE BLDC GLUCOMTR-MCNC: 418 MG/DL (ref 70–130)
GLUCOSE BLDC GLUCOMTR-MCNC: 96 MG/DL (ref 70–130)

## 2021-05-04 PROCEDURE — 25010000002 HEPARIN (PORCINE) PER 1000 UNITS: Performed by: PHYSICIAN ASSISTANT

## 2021-05-04 PROCEDURE — 63710000001 INSULIN LISPRO (HUMAN) PER 5 UNITS: Performed by: NURSE PRACTITIONER

## 2021-05-04 PROCEDURE — 99232 SBSQ HOSP IP/OBS MODERATE 35: CPT | Performed by: NURSE PRACTITIONER

## 2021-05-04 PROCEDURE — 82962 GLUCOSE BLOOD TEST: CPT

## 2021-05-04 PROCEDURE — 63710000001 INSULIN DETEMIR PER 5 UNITS: Performed by: HOSPITALIST

## 2021-05-04 PROCEDURE — 63710000001 INSULIN LISPRO (HUMAN) PER 5 UNITS: Performed by: HOSPITALIST

## 2021-05-04 RX ORDER — SODIUM CHLORIDE 9 MG/ML
50 INJECTION, SOLUTION INTRAVENOUS CONTINUOUS
Status: DISCONTINUED | OUTPATIENT
Start: 2021-05-04 | End: 2021-05-06 | Stop reason: HOSPADM

## 2021-05-04 RX ADMIN — POLYETHYLENE GLYCOL 3350 17 G: 17 POWDER, FOR SOLUTION ORAL at 08:38

## 2021-05-04 RX ADMIN — DOCUSATE SODIUM 50MG AND SENNOSIDES 8.6MG 2 TABLET: 8.6; 5 TABLET, FILM COATED ORAL at 12:22

## 2021-05-04 RX ADMIN — ASPIRIN 81 MG: 81 TABLET, COATED ORAL at 08:38

## 2021-05-04 RX ADMIN — FLUCONAZOLE 100 MG: 100 TABLET ORAL at 20:21

## 2021-05-04 RX ADMIN — SODIUM CHLORIDE, PRESERVATIVE FREE 10 ML: 5 INJECTION INTRAVENOUS at 20:22

## 2021-05-04 RX ADMIN — FLUOXETINE HYDROCHLORIDE 20 MG: 20 CAPSULE ORAL at 08:38

## 2021-05-04 RX ADMIN — TRAMADOL HYDROCHLORIDE 50 MG: 50 TABLET, FILM COATED ORAL at 20:21

## 2021-05-04 RX ADMIN — GABAPENTIN 300 MG: 300 CAPSULE ORAL at 20:22

## 2021-05-04 RX ADMIN — HEPARIN SODIUM 5000 UNITS: 5000 INJECTION INTRAVENOUS; SUBCUTANEOUS at 22:00

## 2021-05-04 RX ADMIN — INSULIN DETEMIR 15 UNITS: 100 INJECTION, SOLUTION SUBCUTANEOUS at 08:38

## 2021-05-04 RX ADMIN — Medication 5 MG: at 20:22

## 2021-05-04 RX ADMIN — ATORVASTATIN CALCIUM 80 MG: 40 TABLET, FILM COATED ORAL at 20:22

## 2021-05-04 RX ADMIN — TERAZOSIN HYDROCHLORIDE 1 MG: 1 CAPSULE ORAL at 20:21

## 2021-05-04 RX ADMIN — HEPARIN SODIUM 5000 UNITS: 5000 INJECTION INTRAVENOUS; SUBCUTANEOUS at 15:08

## 2021-05-04 RX ADMIN — GABAPENTIN 300 MG: 300 CAPSULE ORAL at 08:38

## 2021-05-04 RX ADMIN — PANTOPRAZOLE SODIUM 40 MG: 40 TABLET, DELAYED RELEASE ORAL at 08:38

## 2021-05-04 RX ADMIN — BISACODYL 10 MG: 10 SUPPOSITORY RECTAL at 12:22

## 2021-05-04 RX ADMIN — INSULIN LISPRO 6 UNITS: 100 INJECTION, SOLUTION INTRAVENOUS; SUBCUTANEOUS at 22:00

## 2021-05-04 RX ADMIN — GABAPENTIN 300 MG: 300 CAPSULE ORAL at 15:08

## 2021-05-04 RX ADMIN — FERROUS SULFATE TAB 325 MG (65 MG ELEMENTAL FE) 325 MG: 325 (65 FE) TAB at 08:38

## 2021-05-04 RX ADMIN — SODIUM CHLORIDE 50 ML/HR: 9 INJECTION, SOLUTION INTRAVENOUS at 10:08

## 2021-05-04 RX ADMIN — INSULIN LISPRO 3 UNITS: 100 INJECTION, SOLUTION INTRAVENOUS; SUBCUTANEOUS at 12:22

## 2021-05-04 RX ADMIN — AMLODIPINE BESYLATE 10 MG: 10 TABLET ORAL at 08:38

## 2021-05-04 RX ADMIN — SODIUM CHLORIDE, PRESERVATIVE FREE 10 ML: 5 INJECTION INTRAVENOUS at 08:39

## 2021-05-05 PROBLEM — N39.0 ACUTE UTI (URINARY TRACT INFECTION): Status: RESOLVED | Noted: 2021-05-01 | Resolved: 2021-05-05

## 2021-05-05 PROBLEM — N17.9 AKI (ACUTE KIDNEY INJURY) (HCC): Status: RESOLVED | Noted: 2021-05-01 | Resolved: 2021-05-05

## 2021-05-05 LAB
ANION GAP SERPL CALCULATED.3IONS-SCNC: 6 MMOL/L (ref 5–15)
BUN SERPL-MCNC: 9 MG/DL (ref 8–23)
BUN/CREAT SERPL: 11 (ref 7–25)
CALCIUM SPEC-SCNC: 8.5 MG/DL (ref 8.6–10.5)
CHLORIDE SERPL-SCNC: 109 MMOL/L (ref 98–107)
CO2 SERPL-SCNC: 29 MMOL/L (ref 22–29)
CREAT SERPL-MCNC: 0.82 MG/DL (ref 0.57–1)
GFR SERPL CREATININE-BSD FRML MDRD: 86 ML/MIN/1.73
GLUCOSE BLDC GLUCOMTR-MCNC: 115 MG/DL (ref 70–130)
GLUCOSE BLDC GLUCOMTR-MCNC: 208 MG/DL (ref 70–130)
GLUCOSE BLDC GLUCOMTR-MCNC: 218 MG/DL (ref 70–130)
GLUCOSE BLDC GLUCOMTR-MCNC: 347 MG/DL (ref 70–130)
GLUCOSE SERPL-MCNC: 112 MG/DL (ref 65–99)
POTASSIUM SERPL-SCNC: 3.7 MMOL/L (ref 3.5–5.2)
SODIUM SERPL-SCNC: 144 MMOL/L (ref 136–145)

## 2021-05-05 PROCEDURE — 80048 BASIC METABOLIC PNL TOTAL CA: CPT | Performed by: NURSE PRACTITIONER

## 2021-05-05 PROCEDURE — 25010000002 HEPARIN (PORCINE) PER 1000 UNITS: Performed by: PHYSICIAN ASSISTANT

## 2021-05-05 PROCEDURE — 63710000001 INSULIN DETEMIR PER 5 UNITS: Performed by: HOSPITALIST

## 2021-05-05 PROCEDURE — 63710000001 INSULIN LISPRO (HUMAN) PER 5 UNITS: Performed by: NURSE PRACTITIONER

## 2021-05-05 PROCEDURE — 94799 UNLISTED PULMONARY SVC/PX: CPT

## 2021-05-05 PROCEDURE — 97116 GAIT TRAINING THERAPY: CPT

## 2021-05-05 PROCEDURE — 82962 GLUCOSE BLOOD TEST: CPT

## 2021-05-05 PROCEDURE — 99232 SBSQ HOSP IP/OBS MODERATE 35: CPT | Performed by: PHYSICIAN ASSISTANT

## 2021-05-05 RX ORDER — LACTULOSE 10 G/15ML
20 SOLUTION ORAL DAILY
Status: DISCONTINUED | OUTPATIENT
Start: 2021-05-05 | End: 2021-05-05

## 2021-05-05 RX ORDER — MAGNESIUM CARB/ALUMINUM HYDROX 105-160MG
296 TABLET,CHEWABLE ORAL ONCE
Status: COMPLETED | OUTPATIENT
Start: 2021-05-05 | End: 2021-05-06

## 2021-05-05 RX ORDER — SODIUM PHOSPHATE,MONO-DIBASIC 19G-7G/118
1 ENEMA (ML) RECTAL ONCE
Status: COMPLETED | OUTPATIENT
Start: 2021-05-05 | End: 2021-05-05

## 2021-05-05 RX ADMIN — HEPARIN SODIUM 5000 UNITS: 5000 INJECTION INTRAVENOUS; SUBCUTANEOUS at 05:55

## 2021-05-05 RX ADMIN — TERAZOSIN HYDROCHLORIDE 1 MG: 1 CAPSULE ORAL at 23:26

## 2021-05-05 RX ADMIN — GABAPENTIN 300 MG: 300 CAPSULE ORAL at 09:44

## 2021-05-05 RX ADMIN — INSULIN DETEMIR 15 UNITS: 100 INJECTION, SOLUTION SUBCUTANEOUS at 09:45

## 2021-05-05 RX ADMIN — SODIUM PHOSPHATE 1 ENEMA: 7; 19 ENEMA RECTAL at 15:27

## 2021-05-05 RX ADMIN — INSULIN LISPRO 2 UNITS: 100 INJECTION, SOLUTION INTRAVENOUS; SUBCUTANEOUS at 23:26

## 2021-05-05 RX ADMIN — PANTOPRAZOLE SODIUM 40 MG: 40 TABLET, DELAYED RELEASE ORAL at 09:44

## 2021-05-05 RX ADMIN — FLUOXETINE HYDROCHLORIDE 20 MG: 20 CAPSULE ORAL at 09:44

## 2021-05-05 RX ADMIN — AMLODIPINE BESYLATE 10 MG: 10 TABLET ORAL at 09:44

## 2021-05-05 RX ADMIN — SODIUM CHLORIDE, PRESERVATIVE FREE 10 ML: 5 INJECTION INTRAVENOUS at 23:26

## 2021-05-05 RX ADMIN — ATORVASTATIN CALCIUM 80 MG: 40 TABLET, FILM COATED ORAL at 23:26

## 2021-05-05 RX ADMIN — GABAPENTIN 300 MG: 300 CAPSULE ORAL at 14:42

## 2021-05-05 RX ADMIN — HEPARIN SODIUM 5000 UNITS: 5000 INJECTION INTRAVENOUS; SUBCUTANEOUS at 23:26

## 2021-05-05 RX ADMIN — POLYETHYLENE GLYCOL 3350 17 G: 17 POWDER, FOR SOLUTION ORAL at 09:45

## 2021-05-05 RX ADMIN — GABAPENTIN 300 MG: 300 CAPSULE ORAL at 23:25

## 2021-05-05 RX ADMIN — FLUCONAZOLE 100 MG: 100 TABLET ORAL at 23:25

## 2021-05-05 RX ADMIN — SODIUM CHLORIDE 50 ML/HR: 9 INJECTION, SOLUTION INTRAVENOUS at 04:24

## 2021-05-05 RX ADMIN — ASPIRIN 81 MG: 81 TABLET, COATED ORAL at 09:45

## 2021-05-05 RX ADMIN — SODIUM CHLORIDE, PRESERVATIVE FREE 10 ML: 5 INJECTION INTRAVENOUS at 09:44

## 2021-05-05 RX ADMIN — INSULIN LISPRO 3 UNITS: 100 INJECTION, SOLUTION INTRAVENOUS; SUBCUTANEOUS at 17:54

## 2021-05-05 RX ADMIN — HEPARIN SODIUM 5000 UNITS: 5000 INJECTION INTRAVENOUS; SUBCUTANEOUS at 14:42

## 2021-05-05 RX ADMIN — INSULIN LISPRO 5 UNITS: 100 INJECTION, SOLUTION INTRAVENOUS; SUBCUTANEOUS at 12:17

## 2021-05-05 RX ADMIN — FERROUS SULFATE TAB 325 MG (65 MG ELEMENTAL FE) 325 MG: 325 (65 FE) TAB at 09:44

## 2021-05-06 ENCOUNTER — READMISSION MANAGEMENT (OUTPATIENT)
Dept: CALL CENTER | Facility: HOSPITAL | Age: 61
End: 2021-05-06

## 2021-05-06 ENCOUNTER — TELEPHONE (OUTPATIENT)
Dept: INTERNAL MEDICINE | Facility: CLINIC | Age: 61
End: 2021-05-06

## 2021-05-06 VITALS
WEIGHT: 114.1 LBS | DIASTOLIC BLOOD PRESSURE: 74 MMHG | BODY MASS INDEX: 17.29 KG/M2 | RESPIRATION RATE: 16 BRPM | OXYGEN SATURATION: 99 % | HEART RATE: 68 BPM | SYSTOLIC BLOOD PRESSURE: 142 MMHG | TEMPERATURE: 98.8 F | HEIGHT: 68 IN

## 2021-05-06 PROBLEM — N17.9 AKI (ACUTE KIDNEY INJURY): Status: RESOLVED | Noted: 2021-05-01 | Resolved: 2021-05-06

## 2021-05-06 PROBLEM — R11.2 NAUSEA & VOMITING: Status: RESOLVED | Noted: 2021-05-01 | Resolved: 2021-05-06

## 2021-05-06 PROBLEM — E11.10 DKA (DIABETIC KETOACIDOSES): Status: RESOLVED | Noted: 2021-05-01 | Resolved: 2021-05-06

## 2021-05-06 PROBLEM — N39.0 ACUTE UTI (URINARY TRACT INFECTION): Status: RESOLVED | Noted: 2021-05-01 | Resolved: 2021-05-06

## 2021-05-06 LAB
BACTERIA SPEC AEROBE CULT: NORMAL
BACTERIA SPEC AEROBE CULT: NORMAL
GLUCOSE BLDC GLUCOMTR-MCNC: 201 MG/DL (ref 70–130)
GLUCOSE BLDC GLUCOMTR-MCNC: 218 MG/DL (ref 70–130)

## 2021-05-06 PROCEDURE — 99239 HOSP IP/OBS DSCHRG MGMT >30: CPT | Performed by: PHYSICIAN ASSISTANT

## 2021-05-06 PROCEDURE — 25010000002 HEPARIN (PORCINE) PER 1000 UNITS: Performed by: PHYSICIAN ASSISTANT

## 2021-05-06 PROCEDURE — 63710000001 INSULIN DETEMIR PER 5 UNITS: Performed by: PHYSICIAN ASSISTANT

## 2021-05-06 PROCEDURE — 82962 GLUCOSE BLOOD TEST: CPT

## 2021-05-06 PROCEDURE — 63710000001 INSULIN LISPRO (HUMAN) PER 5 UNITS: Performed by: NURSE PRACTITIONER

## 2021-05-06 RX ORDER — CHOLECALCIFEROL (VITAMIN D3) 125 MCG
5 CAPSULE ORAL NIGHTLY PRN
Start: 2021-05-06

## 2021-05-06 RX ORDER — ACETAMINOPHEN 325 MG/1
650 TABLET ORAL EVERY 4 HOURS PRN
Start: 2021-05-06

## 2021-05-06 RX ORDER — ASPIRIN 81 MG/1
81 TABLET ORAL DAILY
Start: 2021-05-07 | End: 2022-12-12 | Stop reason: HOSPADM

## 2021-05-06 RX ORDER — CALCIUM CARBONATE 200(500)MG
2 TABLET,CHEWABLE ORAL 3 TIMES DAILY PRN
Start: 2021-05-06

## 2021-05-06 RX ORDER — INSULIN GLARGINE 100 [IU]/ML
18 INJECTION, SOLUTION SUBCUTANEOUS NIGHTLY
Qty: 5 PEN | Refills: 2 | Status: SHIPPED | OUTPATIENT
Start: 2021-05-06 | End: 2022-01-05

## 2021-05-06 RX ADMIN — FLUOXETINE HYDROCHLORIDE 20 MG: 20 CAPSULE ORAL at 09:11

## 2021-05-06 RX ADMIN — AMLODIPINE BESYLATE 10 MG: 10 TABLET ORAL at 09:11

## 2021-05-06 RX ADMIN — FERROUS SULFATE TAB 325 MG (65 MG ELEMENTAL FE) 325 MG: 325 (65 FE) TAB at 09:11

## 2021-05-06 RX ADMIN — Medication 296 ML: at 00:08

## 2021-05-06 RX ADMIN — HEPARIN SODIUM 5000 UNITS: 5000 INJECTION INTRAVENOUS; SUBCUTANEOUS at 08:04

## 2021-05-06 RX ADMIN — INSULIN DETEMIR 18 UNITS: 100 INJECTION, SOLUTION SUBCUTANEOUS at 09:08

## 2021-05-06 RX ADMIN — INSULIN LISPRO 3 UNITS: 100 INJECTION, SOLUTION INTRAVENOUS; SUBCUTANEOUS at 13:31

## 2021-05-06 RX ADMIN — INSULIN LISPRO 3 UNITS: 100 INJECTION, SOLUTION INTRAVENOUS; SUBCUTANEOUS at 09:10

## 2021-05-06 RX ADMIN — GABAPENTIN 300 MG: 300 CAPSULE ORAL at 09:11

## 2021-05-06 RX ADMIN — POLYETHYLENE GLYCOL 3350 17 G: 17 POWDER, FOR SOLUTION ORAL at 09:11

## 2021-05-06 RX ADMIN — ASPIRIN 81 MG: 81 TABLET, COATED ORAL at 09:11

## 2021-05-06 RX ADMIN — PANTOPRAZOLE SODIUM 40 MG: 40 TABLET, DELAYED RELEASE ORAL at 09:11

## 2021-05-06 NOTE — OUTREACH NOTE
Prep Survey      Responses   [facility]?  [place]   Is LACE score < 7 ?  No   Emergency Room discharge w/ pulse ox?  No   Eligibility  [facility]   Date of Admission  04/30/21   Date of Discharge  05/06/21   Discharge Disposition  Home or Self Care   Discharge diagnosis  DKA, UTI   Does the patient have one of the following disease processes/diagnoses(primary or secondary)?  Other   Does the patient have Home health ordered?  No   Is there a DME ordered?  Yes   What DME was ordered?  [facility]   Is this a private patient?  Yes   Prep survey completed?  Yes          Feli Kiser, RN

## 2021-05-06 NOTE — TELEPHONE ENCOUNTER
Patient is currently inpatient at [facility].    States that son brought paperwork from [employer] - employer).  Dropped off earlier this week - Monday or Tuesday.    Asking about the status of same paperwork. Short term disability - her work needs this completed paperwork.    Cell: [phone] please call patient

## 2021-05-07 ENCOUNTER — TRANSITIONAL CARE MANAGEMENT TELEPHONE ENCOUNTER (OUTPATIENT)
Dept: CALL CENTER | Facility: HOSPITAL | Age: 61
End: 2021-05-07

## 2021-05-07 NOTE — OUTREACH NOTE
Call Center TCM Note      Responses   [facility] patient discharged from?  [place]   Does the patient have one of the following disease processes/diagnoses(primary or secondary)?  Other   TCM attempt successful?  Yes   Call start time  1305   Revoked Reason  Patient/Family Refused [Patient hung up]   Call end time  1305   Discharge diagnosis  DKA, UTI          Michel Mccallum, RN    5/7/2021, 13:05 EDT

## 2021-05-11 DIAGNOSIS — G89.29 CHRONIC MIDLINE LOW BACK PAIN WITHOUT SCIATICA: ICD-10-CM

## 2021-05-11 DIAGNOSIS — M54.50 CHRONIC MIDLINE LOW BACK PAIN WITHOUT SCIATICA: ICD-10-CM

## 2021-05-11 RX ORDER — TRAMADOL HYDROCHLORIDE 50 MG/1
50 TABLET ORAL EVERY 6 HOURS PRN
Qty: 28 TABLET | Refills: 0 | Status: CANCELLED | OUTPATIENT
Start: 2021-05-11

## 2021-05-13 ENCOUNTER — LAB (OUTPATIENT)
Dept: LAB | Facility: HOSPITAL | Age: 61
End: 2021-05-13

## 2021-05-13 ENCOUNTER — OFFICE VISIT (OUTPATIENT)
Dept: INTERNAL MEDICINE | Facility: CLINIC | Age: 61
End: 2021-05-13

## 2021-05-13 VITALS
WEIGHT: 126 LBS | RESPIRATION RATE: 16 BRPM | TEMPERATURE: 97.1 F | SYSTOLIC BLOOD PRESSURE: 138 MMHG | DIASTOLIC BLOOD PRESSURE: 68 MMHG | HEIGHT: 68 IN | BODY MASS INDEX: 19.1 KG/M2 | HEART RATE: 67 BPM | OXYGEN SATURATION: 98 %

## 2021-05-13 DIAGNOSIS — Z79.4 TYPE 2 DIABETES MELLITUS WITH HYPERGLYCEMIA, WITH LONG-TERM CURRENT USE OF INSULIN (HCC): Primary | ICD-10-CM

## 2021-05-13 DIAGNOSIS — Z23 NEED FOR PNEUMOCOCCAL VACCINATION: ICD-10-CM

## 2021-05-13 DIAGNOSIS — N30.90 RECURRENT CYSTITIS: ICD-10-CM

## 2021-05-13 DIAGNOSIS — E11.65 TYPE 2 DIABETES MELLITUS WITH HYPERGLYCEMIA, WITH LONG-TERM CURRENT USE OF INSULIN (HCC): Primary | ICD-10-CM

## 2021-05-13 DIAGNOSIS — Z11.59 ENCOUNTER FOR HEPATITIS C SCREENING TEST FOR LOW RISK PATIENT: ICD-10-CM

## 2021-05-13 DIAGNOSIS — G89.29 CHRONIC MIDLINE LOW BACK PAIN WITHOUT SCIATICA: ICD-10-CM

## 2021-05-13 DIAGNOSIS — M54.50 CHRONIC MIDLINE LOW BACK PAIN WITHOUT SCIATICA: ICD-10-CM

## 2021-05-13 DIAGNOSIS — E87.6 HYPOKALEMIA: ICD-10-CM

## 2021-05-13 DIAGNOSIS — E43 SEVERE MALNUTRITION (HCC): ICD-10-CM

## 2021-05-13 DIAGNOSIS — R33.9 URINARY RETENTION: ICD-10-CM

## 2021-05-13 DIAGNOSIS — F51.01 PRIMARY INSOMNIA: ICD-10-CM

## 2021-05-13 DIAGNOSIS — H61.23 BILATERAL IMPACTED CERUMEN: ICD-10-CM

## 2021-05-13 LAB
ALBUMIN UR-MCNC: 29.2 MG/DL
BASOPHILS # BLD AUTO: 0.06 10*3/MM3 (ref 0–0.2)
BASOPHILS NFR BLD AUTO: 0.6 % (ref 0–1.5)
BILIRUB BLD-MCNC: NEGATIVE MG/DL
CLARITY, POC: ABNORMAL
COLOR UR: ABNORMAL
DEPRECATED RDW RBC AUTO: 44.8 FL (ref 37–54)
EOSINOPHIL # BLD AUTO: 0.2 10*3/MM3 (ref 0–0.4)
EOSINOPHIL NFR BLD AUTO: 1.9 % (ref 0.3–6.2)
ERYTHROCYTE [DISTWIDTH] IN BLOOD BY AUTOMATED COUNT: 13.1 % (ref 12.3–15.4)
GLUCOSE UR STRIP-MCNC: ABNORMAL MG/DL
HCT VFR BLD AUTO: 34.2 % (ref 34–46.6)
HGB BLD-MCNC: 11.1 G/DL (ref 12–15.9)
IMM GRANULOCYTES # BLD AUTO: 0.05 10*3/MM3 (ref 0–0.05)
IMM GRANULOCYTES NFR BLD AUTO: 0.5 % (ref 0–0.5)
KETONES UR QL: NEGATIVE
LEUKOCYTE EST, POC: ABNORMAL
LYMPHOCYTES # BLD AUTO: 2 10*3/MM3 (ref 0.7–3.1)
LYMPHOCYTES NFR BLD AUTO: 18.6 % (ref 19.6–45.3)
MCH RBC QN AUTO: 30.2 PG (ref 26.6–33)
MCHC RBC AUTO-ENTMCNC: 32.5 G/DL (ref 31.5–35.7)
MCV RBC AUTO: 92.9 FL (ref 79–97)
MONOCYTES # BLD AUTO: 0.65 10*3/MM3 (ref 0.1–0.9)
MONOCYTES NFR BLD AUTO: 6 % (ref 5–12)
NEUTROPHILS NFR BLD AUTO: 7.82 10*3/MM3 (ref 1.7–7)
NEUTROPHILS NFR BLD AUTO: 72.4 % (ref 42.7–76)
NITRITE UR-MCNC: NEGATIVE MG/ML
NRBC BLD AUTO-RTO: 0 /100 WBC (ref 0–0.2)
PH UR: 5 [PH] (ref 5–8)
PLATELET # BLD AUTO: 354 10*3/MM3 (ref 140–450)
PMV BLD AUTO: 10.3 FL (ref 6–12)
PROT UR STRIP-MCNC: ABNORMAL MG/DL
RBC # BLD AUTO: 3.68 10*6/MM3 (ref 3.77–5.28)
RBC # UR STRIP: ABNORMAL /UL
SP GR UR: 1.02 (ref 1–1.03)
UROBILINOGEN UR QL: NORMAL
WBC # BLD AUTO: 10.78 10*3/MM3 (ref 3.4–10.8)

## 2021-05-13 PROCEDURE — 87086 URINE CULTURE/COLONY COUNT: CPT | Performed by: NURSE PRACTITIONER

## 2021-05-13 PROCEDURE — 85025 COMPLETE CBC W/AUTO DIFF WBC: CPT | Performed by: NURSE PRACTITIONER

## 2021-05-13 PROCEDURE — 69209 REMOVE IMPACTED EAR WAX UNI: CPT | Performed by: NURSE PRACTITIONER

## 2021-05-13 PROCEDURE — 36415 COLL VENOUS BLD VENIPUNCTURE: CPT | Performed by: NURSE PRACTITIONER

## 2021-05-13 PROCEDURE — 90471 IMMUNIZATION ADMIN: CPT | Performed by: NURSE PRACTITIONER

## 2021-05-13 PROCEDURE — 82043 UR ALBUMIN QUANTITATIVE: CPT | Performed by: NURSE PRACTITIONER

## 2021-05-13 PROCEDURE — 80053 COMPREHEN METABOLIC PANEL: CPT | Performed by: NURSE PRACTITIONER

## 2021-05-13 PROCEDURE — 90732 PPSV23 VACC 2 YRS+ SUBQ/IM: CPT | Performed by: NURSE PRACTITIONER

## 2021-05-13 PROCEDURE — 99214 OFFICE O/P EST MOD 30 MIN: CPT | Performed by: NURSE PRACTITIONER

## 2021-05-13 RX ORDER — OFLOXACIN 3 MG/ML
5 SOLUTION AURICULAR (OTIC) 2 TIMES DAILY
Qty: 10 ML | Refills: 0 | Status: SHIPPED | OUTPATIENT
Start: 2021-05-13 | End: 2021-05-17 | Stop reason: SDUPTHER

## 2021-05-13 RX ORDER — TRAMADOL HYDROCHLORIDE 50 MG/1
50 TABLET ORAL EVERY 6 HOURS PRN
Qty: 28 TABLET | Refills: 0 | Status: SHIPPED | OUTPATIENT
Start: 2021-05-13 | End: 2021-06-15 | Stop reason: SDUPTHER

## 2021-05-13 RX ORDER — MEGESTROL ACETATE 40 MG/1
40 TABLET ORAL DAILY
Qty: 30 TABLET | Refills: 2 | Status: SHIPPED | OUTPATIENT
Start: 2021-05-13 | End: 2022-02-25

## 2021-05-13 RX ORDER — MIRTAZAPINE 15 MG/1
15 TABLET, FILM COATED ORAL NIGHTLY
Qty: 30 TABLET | Refills: 2 | Status: SHIPPED | OUTPATIENT
Start: 2021-05-13 | End: 2021-05-20 | Stop reason: SDUPTHER

## 2021-05-13 RX ORDER — DIPHENOXYLATE HYDROCHLORIDE AND ATROPINE SULFATE 2.5; .025 MG/1; MG/1
1 TABLET ORAL DAILY
Qty: 30 TABLET | Refills: 11 | Status: SHIPPED | OUTPATIENT
Start: 2021-05-13 | End: 2022-01-28 | Stop reason: SDUPTHER

## 2021-05-13 RX ORDER — NITROFURANTOIN 25; 75 MG/1; MG/1
100 CAPSULE ORAL 2 TIMES DAILY
Qty: 20 CAPSULE | Refills: 0 | Status: SHIPPED | OUTPATIENT
Start: 2021-05-13 | End: 2021-05-23

## 2021-05-14 ENCOUNTER — READMISSION MANAGEMENT (OUTPATIENT)
Dept: CALL CENTER | Facility: HOSPITAL | Age: 61
End: 2021-05-14

## 2021-05-14 LAB
ALBUMIN SERPL-MCNC: 3.6 G/DL (ref 3.5–5.2)
ALBUMIN/GLOB SERPL: 1.4 G/DL
ALP SERPL-CCNC: 98 U/L (ref 39–117)
ALT SERPL W P-5'-P-CCNC: 64 U/L (ref 1–33)
ANION GAP SERPL CALCULATED.3IONS-SCNC: 7.3 MMOL/L (ref 5–15)
AST SERPL-CCNC: 57 U/L (ref 1–32)
BACTERIA SPEC AEROBE CULT: NO GROWTH
BILIRUB SERPL-MCNC: 0.2 MG/DL (ref 0–1.2)
BUN SERPL-MCNC: 18 MG/DL (ref 8–23)
BUN/CREAT SERPL: 17 (ref 7–25)
CALCIUM SPEC-SCNC: 9 MG/DL (ref 8.6–10.5)
CHLORIDE SERPL-SCNC: 100 MMOL/L (ref 98–107)
CO2 SERPL-SCNC: 30.7 MMOL/L (ref 22–29)
CREAT SERPL-MCNC: 1.06 MG/DL (ref 0.57–1)
GFR SERPL CREATININE-BSD FRML MDRD: 64 ML/MIN/1.73
GLOBULIN UR ELPH-MCNC: 2.6 GM/DL
GLUCOSE SERPL-MCNC: 240 MG/DL (ref 65–99)
POTASSIUM SERPL-SCNC: 4.8 MMOL/L (ref 3.5–5.2)
PROT SERPL-MCNC: 6.2 G/DL (ref 6–8.5)
SODIUM SERPL-SCNC: 138 MMOL/L (ref 136–145)

## 2021-05-14 NOTE — OUTREACH NOTE
Medical Week 2 Survey      Responses   [facility] patient discharged from?  [place]   Does the patient have one of the following disease processes/diagnoses(primary or secondary)?  Other   Week 2 attempt successful?  Yes   Call start time  1212   Discharge diagnosis  DKA, UTI   Call end time  1213   Meds reviewed with patient/caregiver?  Yes   Is the patient taking all medications as directed (includes completed medication regime)?  Yes   Comments regarding appointments  Appt with GI is on 5/20/21   Comments regarding PCP  5/13/21   Has the patient kept scheduled appointments due by today?  Yes   What DME was ordered?  [facility]   Has all DME been delivered?  Yes   What is the patient's perception of their health status since discharge?  Improving   Week 2 Call Completed?  Yes          Kerri Griffith RN

## 2021-05-17 RX ORDER — OFLOXACIN 3 MG/ML
5 SOLUTION AURICULAR (OTIC) 2 TIMES DAILY
Qty: 10 ML | Refills: 0 | Status: SHIPPED | OUTPATIENT
Start: 2021-05-17 | End: 2021-05-24

## 2021-05-17 NOTE — TELEPHONE ENCOUNTER
Caller: Thelma Michael    Relationship: Self    Best call back number: [phone]    Medication needed:   Requested Prescriptions     Pending Prescriptions Disp Refills   • ofloxacin (FLOXIN) 0.3 % otic solution 10 mL 0     Sig: Administer 5 drops into both ears 2 (Two) Times a Day for 7 days.       What additional details did the patient provide when requesting the medication: PATIENT USED MEDICATION FOR 2 DAYS AND LOST THE BOTTLE.     Does the patient have less than a 3 day supply:  [x] Yes  [] No    What is the patient's preferred pharmacy: [facility] [address] [place] [address] [phone] [facility] [phone]

## 2021-05-18 ENCOUNTER — NURSE TRIAGE (OUTPATIENT)
Dept: CALL CENTER | Facility: HOSPITAL | Age: 61
End: 2021-05-18

## 2021-05-18 NOTE — TELEPHONE ENCOUNTER
Reviewed guideline with caller advises she see her PCP within 3 days. States she has an appointment coming up.     Reason for Disposition  • [1] MILD swelling of both ankles (i.e., pedal edema) AND [2] new onset or worsening    Additional Information  • Negative: Chest pain  • Negative: Followed an ankle injury  • Negative: Ankle pain is main symptom  • Swelling of both ankles (i.e., pedal edema)  • Negative: Severe difficulty breathing (e.g., struggling for each breath, speaks in single words)  • Negative: Looks like a broken bone or dislocated joint (e.g., crooked or deformed)  • Negative: Sounds like a life-threatening emergency to the triager  • Negative: Chest pain  • Negative: Followed a leg injury  • Negative: [1] Small area of swelling AND [2] followed an insect bite to the area  • Negative: Swelling of one ankle joint  • Negative: Swelling of knee is main symptom  • Negative: Pregnant  • Negative: Postpartum (from 0 to 6 weeks after delivery)  • Negative: Difficulty breathing at rest  • Negative: Entire foot is cool or blue in comparison to other side  • Negative: [1] Can't walk or can barely walk AND [2] new onset  • Negative: [1] Difficulty breathing with exertion (e.g., walking) AND [2] new onset or worsening  • Negative: [1] Red area or streak AND [2] fever  • Negative: [1] Swelling is painful to touch AND [2] fever  • Negative: [1] Cast on leg or ankle AND [2] now increased pain  • Negative: Patient sounds very sick or weak to the triager  • Negative: SEVERE leg swelling (e.g., swelling extends above knee, entire leg is swollen, weeping fluid)  • Negative: [1] Red area or streak [2] large (> 2 in. or 5 cm)  • Negative: [1] Thigh or calf pain AND [2] only 1 side AND [3] present > 1 hour  • Negative: [1] Thigh, calf, or ankle swelling AND [2] only 1 side  • Negative: [1] Thigh, calf, or ankle swelling AND [2] bilateral AND [3] 1 side is more swollen  • Negative: [1] MODERATE leg swelling (e.g., swelling  "extends up to knees) AND [2] new onset or worsening  • Negative: Swelling of face, arm or hands  (Exception: slight puffiness of fingers occurring during hot weather)  • Negative: Looks like a boil, infected sore, deep ulcer or other infected rash (spreading redness, pus)    Answer Assessment - Initial Assessment Questions  1. LOCATION: \"Which joint is swollen?\"      Both feet   2. ONSET: \"When did the swelling start?\"      The other day, comes and goes   3. SIZE: \"How large is the swelling?\"      Swollen to half again as large as normal   4. PAIN: \"Is there any pain?\" If so, ask: \"How bad is it?\" (Scale 1-10; or mild, moderate, severe)      no  5. CAUSE: \"What do you think caused the swollen joint?\"      unknown  6. OTHER SYMPTOMS: \"Do you have any other symptoms?\" (e.g., fever, chest pain, difficulty breathing, calf pain)      no  7. PREGNANCY: \"Is there any chance you are pregnant?\" \"When was your last menstrual period?\"      no    Answer Assessment - Initial Assessment Questions  1. ONSET: \"When did the swelling start?\" (e.g., minutes, hours, days)      A couple of days ago  2. LOCATION: \"What part of the leg is swollen?\"  \"Are both legs swollen or just one leg?\"      Both feet   3. SEVERITY: \"How bad is the swelling?\" (e.g., localized; mild, moderate, severe)   - Localized - small area of swelling localized to one leg   - MILD pedal edema - swelling limited to foot and ankle, pitting edema < 1/4 inch (6 mm) deep, rest and elevation eliminate most or all swelling   - MODERATE edema - swelling of lower leg to knee, pitting edema > 1/4 inch (6 mm) deep, rest and elevation only partially reduce swelling   - SEVERE edema - swelling extends above knee, facial or hand swelling present       mild  4. REDNESS: \"Does the swelling look red or infected?\"      no  5. PAIN: \"Is the swelling painful to touch?\" If so, ask: \"How painful is it?\"   (Scale 1-10; mild, moderate or severe)      no  6. FEVER: \"Do you have a fever?\" " "If so, ask: \"What is it, how was it measured, and when did it start?\"       no  7. CAUSE: \"What do you think is causing the leg swelling?\"      unknown  8. MEDICAL HISTORY: \"Do you have a history of heart failure, kidney disease, liver failure, or cancer?\"      no  9. RECURRENT SYMPTOM: \"Have you had leg swelling before?\" If so, ask: \"When was the last time?\" \"What happened that time?\"      Yes, went away on its own  10. OTHER SYMPTOMS: \"Do you have any other symptoms?\" (e.g., chest pain, difficulty breathing)        no  11. PREGNANCY: \"Is there any chance you are pregnant?\" \"When was your last menstrual period?\"        no    Protocols used: LEG SWELLING AND EDEMA-ADULT-AH, ANKLE SWELLING-ADULT-AH      "

## 2021-05-20 ENCOUNTER — LAB (OUTPATIENT)
Dept: LAB | Facility: HOSPITAL | Age: 61
End: 2021-05-20

## 2021-05-20 ENCOUNTER — OFFICE VISIT (OUTPATIENT)
Dept: GASTROENTEROLOGY | Facility: CLINIC | Age: 61
End: 2021-05-20

## 2021-05-20 VITALS
HEIGHT: 68 IN | HEART RATE: 95 BPM | OXYGEN SATURATION: 98 % | SYSTOLIC BLOOD PRESSURE: 142 MMHG | DIASTOLIC BLOOD PRESSURE: 76 MMHG | WEIGHT: 134.2 LBS | BODY MASS INDEX: 20.34 KG/M2 | TEMPERATURE: 97.8 F

## 2021-05-20 DIAGNOSIS — K58.2 IRRITABLE BOWEL SYNDROME WITH BOTH CONSTIPATION AND DIARRHEA: ICD-10-CM

## 2021-05-20 DIAGNOSIS — K21.9 GASTROESOPHAGEAL REFLUX DISEASE, UNSPECIFIED WHETHER ESOPHAGITIS PRESENT: ICD-10-CM

## 2021-05-20 DIAGNOSIS — R74.8 ELEVATED LIVER ENZYMES: ICD-10-CM

## 2021-05-20 DIAGNOSIS — D64.9 ANEMIA, UNSPECIFIED TYPE: ICD-10-CM

## 2021-05-20 DIAGNOSIS — R11.2 NAUSEA AND VOMITING, INTRACTABILITY OF VOMITING NOT SPECIFIED, UNSPECIFIED VOMITING TYPE: Primary | ICD-10-CM

## 2021-05-20 DIAGNOSIS — R11.2 NAUSEA AND VOMITING, INTRACTABILITY OF VOMITING NOT SPECIFIED, UNSPECIFIED VOMITING TYPE: ICD-10-CM

## 2021-05-20 LAB
ALBUMIN SERPL-MCNC: 4.6 G/DL (ref 3.5–5.2)
ALBUMIN/GLOB SERPL: 1.1 G/DL
ALP SERPL-CCNC: 99 U/L (ref 39–117)
ALT SERPL W P-5'-P-CCNC: 23 U/L (ref 1–33)
ANION GAP SERPL CALCULATED.3IONS-SCNC: 11.6 MMOL/L (ref 5–15)
AST SERPL-CCNC: 18 U/L (ref 1–32)
BILIRUB SERPL-MCNC: 0.4 MG/DL (ref 0–1.2)
BUN SERPL-MCNC: 21 MG/DL (ref 8–23)
BUN/CREAT SERPL: 24.1 (ref 7–25)
CALCIUM SPEC-SCNC: 10.3 MG/DL (ref 8.6–10.5)
CERULOPLASMIN SERPL-MCNC: 37 MG/DL (ref 19–39)
CHLORIDE SERPL-SCNC: 101 MMOL/L (ref 98–107)
CO2 SERPL-SCNC: 28.4 MMOL/L (ref 22–29)
CREAT SERPL-MCNC: 0.87 MG/DL (ref 0.57–1)
DEPRECATED RDW RBC AUTO: 41.9 FL (ref 37–54)
EOSINOPHIL # BLD MANUAL: 0.19 10*3/MM3 (ref 0–0.4)
EOSINOPHIL NFR BLD MANUAL: 2 % (ref 0.3–6.2)
ERYTHROCYTE [DISTWIDTH] IN BLOOD BY AUTOMATED COUNT: 12.9 % (ref 12.3–15.4)
FERRITIN SERPL-MCNC: 198 NG/ML (ref 13–150)
FOLATE SERPL-MCNC: 6.17 NG/ML (ref 4.78–24.2)
GFR SERPL CREATININE-BSD FRML MDRD: 80 ML/MIN/1.73
GLOBULIN UR ELPH-MCNC: 4.1 GM/DL
GLUCOSE SERPL-MCNC: 158 MG/DL (ref 65–99)
HBV SURFACE AG SERPL QL IA: NORMAL
HCT VFR BLD AUTO: 35.9 % (ref 34–46.6)
HCV AB SER DONR QL: NORMAL
HGB BLD-MCNC: 12 G/DL (ref 12–15.9)
INR PPP: 0.95 (ref 0.85–1.16)
IRON 24H UR-MRATE: 144 MCG/DL (ref 37–145)
IRON SATN MFR SERPL: 34 % (ref 20–50)
LYMPHOCYTES # BLD MANUAL: 1.87 10*3/MM3 (ref 0.7–3.1)
LYMPHOCYTES NFR BLD MANUAL: 10 % (ref 5–12)
LYMPHOCYTES NFR BLD MANUAL: 20 % (ref 19.6–45.3)
MCH RBC QN AUTO: 30 PG (ref 26.6–33)
MCHC RBC AUTO-ENTMCNC: 33.4 G/DL (ref 31.5–35.7)
MCV RBC AUTO: 89.8 FL (ref 79–97)
MONOCYTES # BLD AUTO: 0.94 10*3/MM3 (ref 0.1–0.9)
NEUTROPHILS # BLD AUTO: 6.37 10*3/MM3 (ref 1.7–7)
NEUTROPHILS NFR BLD MANUAL: 68 % (ref 42.7–76)
PLAT MORPH BLD: NORMAL
PLATELET # BLD AUTO: 332 10*3/MM3 (ref 140–450)
PMV BLD AUTO: 11.1 FL (ref 6–12)
POTASSIUM SERPL-SCNC: 3.7 MMOL/L (ref 3.5–5.2)
PROT SERPL-MCNC: 8.7 G/DL (ref 6–8.5)
PROTHROMBIN TIME: 12.4 SECONDS (ref 11.4–14.4)
RBC # BLD AUTO: 4 10*6/MM3 (ref 3.77–5.28)
RBC MORPH BLD: NORMAL
SODIUM SERPL-SCNC: 141 MMOL/L (ref 136–145)
TIBC SERPL-MCNC: 428 MCG/DL (ref 298–536)
TRANSFERRIN SERPL-MCNC: 287 MG/DL (ref 200–360)
VIT B12 BLD-MCNC: 619 PG/ML (ref 211–946)
WBC # BLD AUTO: 9.37 10*3/MM3 (ref 3.4–10.8)
WBC MORPH BLD: NORMAL

## 2021-05-20 PROCEDURE — 86255 FLUORESCENT ANTIBODY SCREEN: CPT

## 2021-05-20 PROCEDURE — 82746 ASSAY OF FOLIC ACID SERUM: CPT

## 2021-05-20 PROCEDURE — 86038 ANTINUCLEAR ANTIBODIES: CPT

## 2021-05-20 PROCEDURE — 87340 HEPATITIS B SURFACE AG IA: CPT

## 2021-05-20 PROCEDURE — 84466 ASSAY OF TRANSFERRIN: CPT

## 2021-05-20 PROCEDURE — 83013 H PYLORI (C-13) BREATH: CPT

## 2021-05-20 PROCEDURE — 83516 IMMUNOASSAY NONANTIBODY: CPT

## 2021-05-20 PROCEDURE — 86803 HEPATITIS C AB TEST: CPT

## 2021-05-20 PROCEDURE — 83540 ASSAY OF IRON: CPT

## 2021-05-20 PROCEDURE — 82607 VITAMIN B-12: CPT

## 2021-05-20 PROCEDURE — 82104 ALPHA-1-ANTITRYPSIN PHENO: CPT

## 2021-05-20 PROCEDURE — 82784 ASSAY IGA/IGD/IGG/IGM EACH: CPT

## 2021-05-20 PROCEDURE — 85025 COMPLETE CBC W/AUTO DIFF WBC: CPT

## 2021-05-20 PROCEDURE — 85610 PROTHROMBIN TIME: CPT

## 2021-05-20 PROCEDURE — 86704 HEP B CORE ANTIBODY TOTAL: CPT

## 2021-05-20 PROCEDURE — 82103 ALPHA-1-ANTITRYPSIN TOTAL: CPT

## 2021-05-20 PROCEDURE — 36415 COLL VENOUS BLD VENIPUNCTURE: CPT

## 2021-05-20 PROCEDURE — 99214 OFFICE O/P EST MOD 30 MIN: CPT | Performed by: NURSE PRACTITIONER

## 2021-05-20 PROCEDURE — 82390 ASSAY OF CERULOPLASMIN: CPT

## 2021-05-20 PROCEDURE — 85007 BL SMEAR W/DIFF WBC COUNT: CPT

## 2021-05-20 PROCEDURE — 80053 COMPREHEN METABOLIC PANEL: CPT

## 2021-05-20 PROCEDURE — 82728 ASSAY OF FERRITIN: CPT

## 2021-05-20 PROCEDURE — 86708 HEPATITIS A ANTIBODY: CPT

## 2021-05-20 RX ORDER — TRAZODONE HYDROCHLORIDE 50 MG/1
TABLET ORAL
COMMUNITY
Start: 2021-05-18 | End: 2021-07-06

## 2021-05-20 RX ORDER — ERGOCALCIFEROL 1.25 MG/1
50000 CAPSULE ORAL WEEKLY
COMMUNITY
Start: 2021-05-18 | End: 2021-07-12

## 2021-05-20 RX ORDER — WHEAT DEXTRIN/ASPARTAME 3 G/6 G
1 POWDER IN PACKET (EA) ORAL DAILY
Qty: 30 EACH | Refills: 5 | Status: SHIPPED | OUTPATIENT
Start: 2021-05-20 | End: 2022-07-04 | Stop reason: HOSPADM

## 2021-05-20 NOTE — PROGRESS NOTES
New Patient Consultation     Patient Name: Thelma Michael  : 1960   MRN: [id number]     Chief Complaint:    Chief Complaint   Patient presents with   • Abdominal Pain   • Diarrhea       History of Present Illness: Thelma Michael is a 60 y.o. female who is here today for a Gastroenterology Consultation for elevated liver enzymes, nausea vomiting, alternating diarrhea and constipation, GERD.    Symptoms have been present for about 1 year.  Most concerning symptoms are the nausea.  Nausea is happening in bouts- every few days.  Phenergan helps with nausea.  Does not often vomit.  She also has reflux that is uncontrolled with conservative therapy.  Recently switched from Nexium to pantoprazole without much improvement.  Intermittent dysphagia to solids.  She is anemic.  She has never had an upper endoscopy.  She does admit to dark tarry stools but is taking p.o. iron.  Also reports chronic lower abdominal pain but also suffers from chronic cystitis.  Her bowel habits alternate from constipation to diarrhea with diarrhea predominant.  No hematochezia  Elevated liver enzymes: Multiple CT abdomens this year without liver abnormality.  Modest alcohol use.  History of uncontrolled diabetes.    SCANNED - COLONOSCOPY (12/10/2020)- from 2016- due for recall  No radiation treatments to abdomen.  No past surgical history  Subjective      Review of Systems:   Review of Systems   Constitutional: Negative for appetite change and unexpected weight loss.   HENT: Positive for trouble swallowing.    Gastrointestinal: Positive for abdominal distention, abdominal pain, constipation, diarrhea, nausea, vomiting, GERD and indigestion. Negative for anal bleeding, blood in stool and rectal pain.       Past Medical History:   Past Medical History:   Diagnosis Date   • Acid reflux    • Acute bronchitis    • Cardiac murmur    • Diabetes mellitus (CMS/HCC)    • H/O echocardiogram 2012    i. LVEF 65%.ii. Mild  LVH.iii. Borderline evidence of atrial septal aneurysm.  No PFO.    • History of nuclear stress test 2014    Negative for ischemia and scars; LVEF 77%.     • Hyperlipidemia    • Hypertension    • Impacted cerumen of both ears    • Migraine    • Self-catheterizes urinary bladder    • Sinusitis    • Tobacco abuse     quit 4 days ago.     • Urticaria        Past Surgical History:   Past Surgical History:   Procedure Laterality Date   • COLONOSCOPY     • DENTAL PROCEDURE     • NO PAST SURGERIES         Family History:   Family History   Problem Relation Age of Onset   • Anxiety disorder Other    • Arthritis Other    • ADD / ADHD Other    • Heart disease Other         cardiac disorder   • Depression Other    • Diabetes Other    • Hyperlipidemia Other    • Hypertension Other    • Lung cancer Other    • Osteoporosis Other    • Colon polyps Brother    • Colon cancer Neg Hx        Social History:   Social History     Socioeconomic History   • Marital status:      Spouse name: Not on file   • Number of children: Not on file   • Years of education: Not on file   • Highest education level: Not on file   Tobacco Use   • Smoking status: Former Smoker     Quit date: 2019     Years since quittin.9   • Smokeless tobacco: Never Used   • Tobacco comment: BC PL never smoker    Vaping Use   • Vaping Use: Never used   Substance and Sexual Activity   • Alcohol use: Yes     Alcohol/week: 1.0 standard drinks     Types: 1 Glasses of wine per week     Comment: ocassional   • Drug use: No   • Sexual activity: Defer     Comment: Single        Alcohol/Tobacco History:   Social History     Substance and Sexual Activity   Alcohol Use Yes   • Alcohol/week: 1.0 standard drinks   • Types: 1 Glasses of wine per week    Comment: ocassional     Social History     Tobacco Use   Smoking Status Former Smoker   • Quit date: 2019   • Years since quittin.9   Smokeless Tobacco Never Used   Tobacco Comment    BC PL never smoker         Medications:     Current Outpatient Medications:   •  acetaminophen (TYLENOL) 325 MG tablet, Take 2 tablets by mouth Every 4 (Four) Hours As Needed for Mild Pain ., Disp: , Rfl:   •  albuterol sulfate  (90 Base) MCG/ACT inhaler, Inhale 2 puffs Every 4 (Four) Hours As Needed for Wheezing., Disp: 18 g, Rfl: 5  •  amLODIPine (NORVASC) 10 MG tablet, Take 1 tablet by mouth Daily., Disp: 30 tablet, Rfl: 5  •  Ascorbic Acid (VITAMIN C PO), Vitamin C TABS, Disp: , Rfl:   •  aspirin 81 MG EC tablet, Take 1 tablet by mouth Daily., Disp: , Rfl:   •  atorvastatin (LIPITOR) 80 MG tablet, Take 1 tablet by mouth Every Night., Disp: 30 tablet, Rfl: 5  •  Blood Glucose Monitoring Suppl (FREESTYLE FREEDOM LITE) w/Device kit, 1 kit 3 (Three) Times a Day. TEST; For: Diabetic hypoglycemia, Diabetic peripheral neuropathy, Disp: 1 each, Rfl: 0  •  calcium carbonate (TUMS) 500 MG chewable tablet, Chew 1,000 mg 3 (Three) Times a Day As Needed for Indigestion or Heartburn., Disp: , Rfl:   •  dicyclomine (Bentyl) 20 MG tablet, Take 1 tablet by mouth Every 6 (Six) Hours As Needed (diarrhea)., Disp: 60 tablet, Rfl: 5  •  doxazosin (CARDURA) 1 MG tablet, Take 1 tablet by mouth Every Night., Disp: 30 tablet, Rfl: 5  •  ferrous sulfate 325 (65 FE) MG tablet, Take 1 tablet by mouth Daily With Breakfast. Take with orange juice or vitamin C, Disp: 30 tablet, Rfl: 2  •  FLUoxetine (PROzac) 20 MG capsule, Take 1 capsule by mouth Daily., Disp: 30 capsule, Rfl: 1  •  gabapentin (NEURONTIN) 400 MG capsule, Take 1 capsule by mouth 3 (Three) Times a Day., Disp: 90 capsule, Rfl: 2  •  glucose blood (Glucose Meter Test) test strip, Use as instructed to check blood glucose levels 3 times daily, Disp: 100 each, Rfl: 5  •  glucose monitor monitoring kit, 1 each As Needed (Check blood sugars 3 times daily.)., Disp: 1 each, Rfl: 0  •  Insulin Glargine (BASAGLAR KWIKPEN) 100 UNIT/ML injection pen, Inject 18 Units under the skin into the appropriate  area as directed Every Night., Disp: 5 pen, Rfl: 2  •  Insulin Lispro (ADMELOG SOLOSTAR) 100 UNIT/ML injection pen, Inject 0-14 units under skin into appropriate area based upon sliding scale 3 times daily with meals as needed. Do not exceed 40 units per day., Disp: 5 pen, Rfl: 5  •  Insulin Pen Needle (BD Pen Needle Cydney U/F) 32G X 4 MM misc, Take 1 each by mouth 4 (Four) Times a Day., Disp: 100 each, Rfl: 5  •  Lancets misc, Use as instructed to test blood glucose levels 3 times daily., Disp: 100 each, Rfl: 5  •  megestrol (MEGACE) 40 MG tablet, Take 1 tablet by mouth Daily., Disp: 30 tablet, Rfl: 2  •  melatonin 5 MG tablet tablet, Take 1 tablet by mouth At Night As Needed (sleep)., Disp: , Rfl:   •  multivitamin (Multiple Vitamin) tablet tablet, Take 1 tablet by mouth Daily., Disp: 30 tablet, Rfl: 11  •  nitrofurantoin, macrocrystal-monohydrate, (Macrobid) 100 MG capsule, Take 1 capsule by mouth 2 (Two) Times a Day for 10 days., Disp: 20 capsule, Rfl: 0  •  ofloxacin (FLOXIN) 0.3 % otic solution, Administer 5 drops into both ears 2 (Two) Times a Day for 7 days., Disp: 10 mL, Rfl: 0  •  pantoprazole (PROTONIX) 40 MG EC tablet, Take 1 tablet by mouth Daily., Disp: 30 tablet, Rfl: 5  •  PHARMACY MEDS TO BED CONSULT, Daily., Disp: , Rfl:   •  promethazine (PHENERGAN) 12.5 MG tablet, Take 1 tablet by mouth Every 6 (Six) Hours As Needed for Nausea or Vomiting., Disp: 20 tablet, Rfl: 0  •  sennosides-docusate (senna-docusate sodium) 8.6-50 MG per tablet, Take 2 tablets by mouth 2 (Two) Times a Day As Needed for Constipation., Disp: 60 tablet, Rfl: 5  •  traMADol (ULTRAM) 50 MG tablet, Take 1 tablet by mouth Every 6 (Six) Hours As Needed for Moderate Pain ., Disp: 28 tablet, Rfl: 0  •  traZODone (DESYREL) 50 MG tablet, TAKE ONE TO TWO TABLETS ONE HOUR BEFORE BEDTIME AS NEEDED FOR SLEEP, Disp: , Rfl:   •  vitamin D (ERGOCALCIFEROL) 1.25 MG (47134 UT) capsule capsule, Take 50,000 Units by mouth 1 (One) Time Per Week.,  "Disp: , Rfl:   •  Wheat Dextrin (Benefiber Drink Mix) pack, Take 1 Scoop by mouth Daily., Disp: 30 each, Rfl: 5    Allergies:   No Known Allergies    Objective     Physical Exam:  Vital Signs:   Vitals:    05/20/21 1037   BP: 142/76   BP Location: Left arm   Patient Position: Sitting   Cuff Size: Adult   Pulse: 95   Temp: 97.8 °F (36.6 °C)   TempSrc: Temporal   SpO2: 98%   Weight: 60.9 kg (134 lb 3.2 oz)   Height: 172.7 cm (67.99\")     Body mass index is 20.41 kg/m².     Physical Exam  Vitals and nursing note reviewed.   Constitutional:       General: She is not in acute distress.     Appearance: She is well-developed. She is not diaphoretic.   Eyes:      General: No scleral icterus.     Extraocular Movements:      Right eye: No nystagmus.      Left eye: No nystagmus.      Conjunctiva/sclera: Conjunctivae normal.      Pupils: Pupils are equal, round, and reactive to light.   Neck:      Thyroid: No thyromegaly.   Cardiovascular:      Rate and Rhythm: Normal rate and regular rhythm.   Pulmonary:      Effort: Pulmonary effort is normal.      Breath sounds: Normal breath sounds.   Abdominal:      General: Bowel sounds are normal. There is distension. There are no signs of injury.      Palpations: Abdomen is soft. There is no shifting dullness, hepatomegaly or splenomegaly.      Tenderness: There is abdominal tenderness in the right lower quadrant, suprapubic area and left lower quadrant. There is no rebound.      Hernia: No hernia is present.   Musculoskeletal:      Cervical back: Neck supple.      Right lower leg: No edema.      Left lower leg: No edema.   Skin:     General: Skin is warm and dry.      Capillary Refill: Capillary refill takes 2 to 3 seconds.      Coloration: Skin is not jaundiced or pale.      Findings: No bruising or petechiae.      Nails: There is no clubbing.   Neurological:      Mental Status: She is alert and oriented to person, place, and time.   Psychiatric:         Behavior: Behavior normal.    "      Thought Content: Thought content normal.         Judgment: Judgment normal.         Assessment / Plan      Assessment/Plan:   Diagnoses and all orders for this visit:    1. Nausea and vomiting, intractability of vomiting not specified, unspecified vomiting type (Primary)  -     Celiac Comprehensive Panel; Future  -     NM Gastric Emptying; Future  -     Ambulatory referral for Screening EGD  Recommend EGD and gastric emptying study.  Recommend lara for nausea  2. Anemia, unspecified type  -     Vitamin B12; Future  -     Folate; Future  -     Iron Profile; Future  -     Ferritin; Future  -     Celiac Comprehensive Panel; Future  -     Ambulatory referral for Screening EGD  -     Ambulatory Referral For Screening Colonoscopy  Rule out GI blood loss  3. Irritable bowel syndrome with both constipation and diarrhea  -     Celiac Comprehensive Panel; Future  -     Wheat Dextrin (Benefiber Drink Mix) pack; Take 1 Scoop by mouth Daily.  Dispense: 30 each; Refill: 5  -     Ambulatory Referral For Screening Colonoscopy  Recommend starting Benefiber once daily  4. Gastroesophageal reflux disease, unspecified whether esophagitis present  -     H. Pylori Breath Test - Breath, Lung; Future  -     Ambulatory referral for Screening EGD  Continue PPI, H. pylori breath test today, EGD, screen for gastroparesis  5. Elevated liver enzymes  -     CBC & Differential; Future  -     Comprehensive Metabolic Panel; Future  -     Protime-INR; Future  -     Hepatitis C Antibody; Future  -     Hepatitis B Surface Antigen; Future  -     Hepatitis B Core Antibody, Total; Future  -     Hepatitis A Antibody, Total; Future  -     Ferritin; Future  -     Ceruloplasmin; Future  -     Anti-Smooth Muscle Antibody Titer; Future  -     KEISHA; Future  -     Alpha - 1 - Antitrypsin Phenotype; Future           Follow Up:   Return in about 6 weeks (around 7/1/2021).    Plan of care reviewed with the patient at the conclusion of today's visit.  Education  was provided regarding diagnosis, management, and any prescribed or recommended OTC medications.  Patient verbalized understanding of and agreement with management plan.         HOWIE Samuels  [facility]     Please note that portions of this note may have been completed with a voice recognition program. Efforts were made to edit the dictations, but occasionally words are mistranscribed.

## 2021-05-21 DIAGNOSIS — Z12.11 SCREENING FOR COLON CANCER: Primary | ICD-10-CM

## 2021-05-21 LAB
ACTIN IGG SERPL-ACNC: 12 UNITS (ref 0–19)
ANA SER QL: NEGATIVE
ENDOMYSIUM IGA SER QL: NEGATIVE
GLIADIN PEPTIDE IGA SER-ACNC: 10 UNITS (ref 0–19)
GLIADIN PEPTIDE IGG SER-ACNC: 2 UNITS (ref 0–19)
HAV AB SER QL IA: NEGATIVE
HBV CORE AB SERPL QL IA: NEGATIVE
IGA SERPL-MCNC: 340 MG/DL (ref 87–352)
TTG IGA SER-ACNC: <2 U/ML (ref 0–3)
TTG IGG SER-ACNC: 3 U/ML (ref 0–5)
UREA BREATH TEST QL: NEGATIVE

## 2021-05-21 RX ORDER — SODIUM, POTASSIUM,MAG SULFATES 17.5-3.13G
1 SOLUTION, RECONSTITUTED, ORAL ORAL TAKE AS DIRECTED
Qty: 354 ML | Refills: 0 | Status: SHIPPED | OUTPATIENT
Start: 2021-05-21 | End: 2021-07-03 | Stop reason: HOSPADM

## 2021-05-25 ENCOUNTER — READMISSION MANAGEMENT (OUTPATIENT)
Dept: CALL CENTER | Facility: HOSPITAL | Age: 61
End: 2021-05-25

## 2021-05-25 NOTE — OUTREACH NOTE
Medical Week 3 Survey      Responses   [facility] patient discharged from?  [place]   Does the patient have one of the following disease processes/diagnoses(primary or secondary)?  Other   Week 3 attempt successful?  No   Unsuccessful attempts  Attempt 1          Michel Mccallum RN

## 2021-05-27 LAB
A1AT PHENOTYP SERPL IFE: NORMAL
A1AT SERPL-MCNC: 165 MG/DL (ref 101–187)

## 2021-06-01 ENCOUNTER — READMISSION MANAGEMENT (OUTPATIENT)
Dept: CALL CENTER | Facility: HOSPITAL | Age: 61
End: 2021-06-01

## 2021-06-01 NOTE — OUTREACH NOTE
"Medical Week 3 Survey      Responses   [facility] patient discharged from?  [place]   Does the patient have one of the following disease processes/diagnoses(primary or secondary)?  Other   Week 3 attempt successful?  Yes   Call start time  0756   Call end time  0759   Meds reviewed with patient/caregiver?  Yes   Is the patient taking all medications as directed (includes completed medication regime)?  Yes   Has the patient kept scheduled appointments due by today?  Yes   What is the patient's perception of their health status since discharge?  Improving   Week 3 Call Completed?  Yes   Wrap up additional comments  States that BS is \"doing well\".  She sees an endocrinologist on 6/8 and has not had to change her insulin dose at this time.  She is also scheduled for and EGD and colonoscopy.  No problems or questions at this time.          Amaya Sun RN  "

## 2021-06-07 ENCOUNTER — APPOINTMENT (OUTPATIENT)
Dept: PREADMISSION TESTING | Facility: HOSPITAL | Age: 61
End: 2021-06-07

## 2021-06-07 ENCOUNTER — TELEPHONE (OUTPATIENT)
Dept: GASTROENTEROLOGY | Facility: CLINIC | Age: 61
End: 2021-06-07

## 2021-06-07 LAB — SARS-COV-2 RNA PNL SPEC NAA+PROBE: NOT DETECTED

## 2021-06-07 PROCEDURE — C9803 HOPD COVID-19 SPEC COLLECT: HCPCS

## 2021-06-07 PROCEDURE — U0004 COV-19 TEST NON-CDC HGH THRU: HCPCS

## 2021-06-07 NOTE — TELEPHONE ENCOUNTER
Ms Morton called this afternoon. Went over bowel prep instructions with patient. Patient did received instructions in the mail.

## 2021-06-08 ENCOUNTER — OFFICE VISIT (OUTPATIENT)
Dept: ENDOCRINOLOGY | Facility: CLINIC | Age: 61
End: 2021-06-08

## 2021-06-08 VITALS
HEART RATE: 88 BPM | WEIGHT: 135 LBS | DIASTOLIC BLOOD PRESSURE: 84 MMHG | BODY MASS INDEX: 20.46 KG/M2 | HEIGHT: 68 IN | OXYGEN SATURATION: 100 % | SYSTOLIC BLOOD PRESSURE: 118 MMHG

## 2021-06-08 DIAGNOSIS — E10.65 UNCONTROLLED TYPE 1 DIABETES MELLITUS WITH HYPERGLYCEMIA (HCC): Primary | ICD-10-CM

## 2021-06-08 DIAGNOSIS — E11.42 DIABETIC PERIPHERAL NEUROPATHY (HCC): ICD-10-CM

## 2021-06-08 PROBLEM — E11.65 TYPE 2 DIABETES MELLITUS WITH HYPERGLYCEMIA: Status: RESOLVED | Noted: 2020-04-15 | Resolved: 2021-06-08

## 2021-06-08 PROCEDURE — 92250 FUNDUS PHOTOGRAPHY W/I&R: CPT | Performed by: INTERNAL MEDICINE

## 2021-06-08 PROCEDURE — 2033F EYE IMG VALID W/O RTNOPTHY: CPT | Performed by: INTERNAL MEDICINE

## 2021-06-08 PROCEDURE — 99204 OFFICE O/P NEW MOD 45 MIN: CPT | Performed by: INTERNAL MEDICINE

## 2021-06-08 RX ORDER — PROCHLORPERAZINE 25 MG/1
1 SUPPOSITORY RECTAL
Qty: 1 EACH | Refills: 0 | Status: ON HOLD | OUTPATIENT
Start: 2021-06-08 | End: 2022-12-12

## 2021-06-08 RX ORDER — PROCHLORPERAZINE 25 MG/1
SUPPOSITORY RECTAL
Qty: 9 EACH | Refills: 3 | Status: SHIPPED | OUTPATIENT
Start: 2021-06-08 | End: 2022-01-05 | Stop reason: SDUPTHER

## 2021-06-08 RX ORDER — PROCHLORPERAZINE 25 MG/1
1 SUPPOSITORY RECTAL
Qty: 1 EACH | Refills: 3 | Status: SHIPPED | OUTPATIENT
Start: 2021-06-08 | End: 2022-01-05 | Stop reason: SDUPTHER

## 2021-06-08 RX ORDER — GABAPENTIN 400 MG/1
400 CAPSULE ORAL 3 TIMES DAILY
Qty: 90 CAPSULE | Refills: 1 | Status: SHIPPED | OUTPATIENT
Start: 2021-06-08 | End: 2022-01-14 | Stop reason: SDUPTHER

## 2021-06-08 NOTE — ASSESSMENT & PLAN NOTE
Uncontrolled, unstable type 1 diabetes.  At high risk for complications.  As she is checking bg qid and adjusting insulin QID, I recommend she use a dexcom.  She will look into getting a tandem pump as well    As she is WAY overdue for an eye exam, we did retinal photo today  Left eye was unreadable  Right eye was normal.

## 2021-06-08 NOTE — PROGRESS NOTES
"     Office Note      Date: 2021  Patient Name: Thelma Michael  MRN: [id number]  : 1960    Chief Complaint   Patient presents with   • Diabetes       History of Present Illness:   Thelma Michael is a 60 y.o. female who presents for Diabetes - type 1  Diagnosed at age 38  She has been on insulin since the time of diagnosed.  She is currently prescribed 4 shots per day   She checks bg 4 times per day and adjusts her insulin dose 4 times per day based upon the results.  Last A1c:  Hemoglobin A1C   Date Value Ref Range Status   2021 11.40 (H) 4.80 - 5.60 % Final   she has occasional hypoglycemia.  Usually related to poor diet     Subjective      Diabetic Complications:  Eyes: No- has  Not had eye exam for 2 years and does not have an eye doctor   Kidneys: No  Feet: Yes, describe: symptoms of neuropathy  Heart: Yes, describe: stroke     Diet and Exercise:  Meals per day: 3  Minutes of exercise per week: bike - 150  mins.    Review of Systems:   Review of Systems   Constitutional: Positive for fatigue and unexpected weight change.   Eyes: Negative.    Respiratory: Negative.    Cardiovascular: Negative.    Gastrointestinal: Negative.    Endocrine: Negative.    Genitourinary: Negative.    Musculoskeletal: Negative.    Skin: Negative.    Allergic/Immunologic: Negative.    Neurological: Negative.    Hematological: Negative.    Psychiatric/Behavioral: Negative.        The following portions of the patient's history were reviewed and updated as appropriate: allergies, current medications, past family history, past medical history, past social history, past surgical history and problem list.    Objective     Visit Vitals  /84 (BP Location: Left arm, Patient Position: Sitting, Cuff Size: Adult)   Pulse 88   Ht 172.7 cm (68\")   Wt 61.2 kg (135 lb)   SpO2 100%   BMI 20.53 kg/m²       Labs:    CMP  Lab Results   Component Value Date    GLUCOSE 158 (H) 2021    BUN 21 2021    " CREATININE 0.87 05/20/2021    EGFRIFNONA 61 01/06/2015    EGFRIFAFRI 80 05/20/2021    BCR 24.1 05/20/2021    K 3.7 05/20/2021    CO2 28.4 05/20/2021    CALCIUM 10.3 05/20/2021    PROTENTOTREF 7.6 01/06/2015    LABIL2 1.6 01/06/2015    AST 18 05/20/2021    ALT 23 05/20/2021        CBC w/DIFF  Lab Results   Component Value Date    WBC 9.37 05/20/2021    RBC 4.00 05/20/2021    HGB 12.0 05/20/2021    HCT 35.9 05/20/2021    MCV 89.8 05/20/2021    MCH 30.0 05/20/2021    MCHC 33.4 05/20/2021    RDW 12.9 05/20/2021    RDWSD 41.9 05/20/2021    MPV 11.1 05/20/2021     05/20/2021    NEUTRORELPCT 72.4 05/13/2021    LYMPHORELPCT 18.6 (L) 05/13/2021    MONORELPCT 6.0 05/13/2021    EOSRELPCT 1.9 05/13/2021    BASORELPCT 0.6 05/13/2021    AUTOIGPER 0.5 05/13/2021    NEUTROABS 6.37 05/20/2021    LYMPHSABS 2.00 05/13/2021    MONOSABS 0.65 05/13/2021    EOSABS 0.19 05/20/2021    BASOSABS 0.06 05/13/2021    AUTOIGNUM 0.05 05/13/2021    NRBC 0.0 05/13/2021       Physical Exam:  Physical Exam  Vitals reviewed.   Constitutional:       Appearance: Normal appearance.   HENT:      Head: Atraumatic.   Eyes:      Extraocular Movements: Extraocular movements intact.   Neck:      Comments: No goiter  Cardiovascular:      Rate and Rhythm: Normal rate and regular rhythm.   Pulmonary:      Effort: Pulmonary effort is normal. No respiratory distress.   Musculoskeletal:         General: Normal range of motion.   Lymphadenopathy:      Cervical: No cervical adenopathy.   Skin:     General: Skin is warm and dry.   Neurological:      Mental Status: She is alert and oriented to person, place, and time.   Psychiatric:         Mood and Affect: Mood normal.         Thought Content: Thought content normal.         Judgment: Judgment normal.         Assessment / Plan      Assessment & Plan:  Problem List Items Addressed This Visit        Other    Uncontrolled type 1 diabetes mellitus with hyperglycemia (CMS/HCC) - Primary    Overview     dx'd age 38 .  Has had dka         Current Assessment & Plan     Uncontrolled, unstable type 1 diabetes.  At high risk for complications.  As she is checking bg qid and adjusting insulin QID, I recommend she use a dexcom.  She will look into getting a tandem pump as well    As she is WAY overdue for an eye exam, we did retinal photo today  Left eye was unreadable  Right eye was normal.         Relevant Medications    Insulin Lispro (ADMELOG SOLOSTAR) 100 UNIT/ML injection pen    Insulin Glargine (BASAGLAR KWIKPEN) 100 UNIT/ML injection pen    Continuous Blood Gluc Transmit (Dexcom G6 Transmitter) misc    Continuous Blood Gluc Sensor (Dexcom G6 Sensor)    Continuous Blood Gluc  (Dexcom G6 ) device           Gerson Ochoa MD   06/08/2021

## 2021-06-08 NOTE — TELEPHONE ENCOUNTER
Caller: Thelma Michael    Relationship: Self    Best call back number: [phone]     Medication needed:   Requested Prescriptions     Pending Prescriptions Disp Refills   • gabapentin (NEURONTIN) 400 MG capsule 90 capsule 2     Sig: Take 1 capsule by mouth 3 (Three) Times a Day.       When do you need the refill by: TODAY    What additional details did the patient provide when requesting the medication: TOOK LAST ONE LAST NIGHT    Does the patient have less than a 3 day supply:  [x] Yes  [] No    What is the patient's preferred pharmacy: [facility] [address] [place] [address] [phone] [facility] [phone]

## 2021-06-15 DIAGNOSIS — K21.9 GASTROESOPHAGEAL REFLUX DISEASE: ICD-10-CM

## 2021-06-15 DIAGNOSIS — G89.29 CHRONIC MIDLINE LOW BACK PAIN WITHOUT SCIATICA: ICD-10-CM

## 2021-06-15 DIAGNOSIS — M54.50 CHRONIC MIDLINE LOW BACK PAIN WITHOUT SCIATICA: ICD-10-CM

## 2021-06-15 DIAGNOSIS — E11.42 DIABETIC PERIPHERAL NEUROPATHY (HCC): ICD-10-CM

## 2021-06-15 RX ORDER — INSULIN LISPRO 100 U/ML
INJECTION, SOLUTION SUBCUTANEOUS
Qty: 5 PEN | Refills: 5 | Status: SHIPPED | OUTPATIENT
Start: 2021-06-15 | End: 2022-01-05 | Stop reason: SDUPTHER

## 2021-06-15 RX ORDER — PANTOPRAZOLE SODIUM 40 MG/1
40 TABLET, DELAYED RELEASE ORAL DAILY
Qty: 30 TABLET | Refills: 5 | Status: SHIPPED | OUTPATIENT
Start: 2021-06-15 | End: 2022-01-28 | Stop reason: SDUPTHER

## 2021-06-15 RX ORDER — TRAMADOL HYDROCHLORIDE 50 MG/1
50 TABLET ORAL EVERY 6 HOURS PRN
Qty: 28 TABLET | Refills: 0 | Status: SHIPPED | OUTPATIENT
Start: 2021-06-15 | End: 2021-07-03 | Stop reason: HOSPADM

## 2021-06-15 RX ORDER — GABAPENTIN 400 MG/1
400 CAPSULE ORAL 3 TIMES DAILY
Qty: 90 CAPSULE | Refills: 1 | Status: CANCELLED | OUTPATIENT
Start: 2021-06-15

## 2021-06-15 NOTE — TELEPHONE ENCOUNTER
Caller: MichaelThelma    Relationship: Self    Best call back number: [phone]    Medication needed:   Requested Prescriptions     Pending Prescriptions Disp Refills   • pantoprazole (PROTONIX) 40 MG EC tablet 30 tablet 5     Sig: Take 1 tablet by mouth Daily.   • traMADol (ULTRAM) 50 MG tablet 28 tablet 0     Sig: Take 1 tablet by mouth Every 6 (Six) Hours As Needed for Moderate Pain .   • gabapentin (NEURONTIN) 400 MG capsule 90 capsule 1     Sig: Take 1 capsule by mouth 3 (Three) Times a Day.   • Insulin Lispro (ADMELOG SOLOSTAR) 100 UNIT/ML injection pen 5 pen 5     Sig: Inject 0-14 units under skin into appropriate area based upon sliding scale 3 times daily with meals as needed. Do not exceed 40 units per day.       When do you need the refill by: 06/22/21    What additional details did the patient provide when requesting the medication: PATIENT HAS ABOUT A WEEK LEFT.     Does the patient have less than a 3 day supply:  [] Yes  [x] No    What is the patient's preferred pharmacy: [facility] [address] [place] [address] [phone] [facility] [phone]

## 2021-06-25 ENCOUNTER — APPOINTMENT (OUTPATIENT)
Dept: PREADMISSION TESTING | Facility: HOSPITAL | Age: 61
End: 2021-06-25

## 2021-06-25 PROCEDURE — C9803 HOPD COVID-19 SPEC COLLECT: HCPCS

## 2021-06-25 PROCEDURE — U0004 COV-19 TEST NON-CDC HGH THRU: HCPCS

## 2021-06-26 LAB — SARS-COV-2 RNA NOSE QL NAA+PROBE: NOT DETECTED

## 2021-06-27 ENCOUNTER — APPOINTMENT (OUTPATIENT)
Dept: CT IMAGING | Facility: HOSPITAL | Age: 61
End: 2021-06-27

## 2021-06-27 ENCOUNTER — APPOINTMENT (OUTPATIENT)
Dept: GENERAL RADIOLOGY | Facility: HOSPITAL | Age: 61
End: 2021-06-27

## 2021-06-27 ENCOUNTER — HOSPITAL ENCOUNTER (INPATIENT)
Facility: HOSPITAL | Age: 61
LOS: 6 days | Discharge: HOME | End: 2021-07-03
Attending: EMERGENCY MEDICINE | Admitting: INTERNAL MEDICINE

## 2021-06-27 DIAGNOSIS — E10.65 UNCONTROLLED TYPE 1 DIABETES MELLITUS WITH HYPERGLYCEMIA (HCC): ICD-10-CM

## 2021-06-27 DIAGNOSIS — R11.2 NON-INTRACTABLE VOMITING WITH NAUSEA, UNSPECIFIED VOMITING TYPE: ICD-10-CM

## 2021-06-27 DIAGNOSIS — E10.10 DIABETIC KETOACIDOSIS WITHOUT COMA ASSOCIATED WITH TYPE 1 DIABETES MELLITUS (HCC): Primary | ICD-10-CM

## 2021-06-27 LAB
ALBUMIN SERPL-MCNC: 4.6 G/DL (ref 3.5–5.2)
ALBUMIN/GLOB SERPL: 1.4 G/DL
ALP SERPL-CCNC: 103 U/L (ref 39–117)
ALT SERPL W P-5'-P-CCNC: 21 U/L (ref 1–33)
ANION GAP SERPL CALCULATED.3IONS-SCNC: 12 MMOL/L (ref 5–15)
ANION GAP SERPL CALCULATED.3IONS-SCNC: 14 MMOL/L (ref 5–15)
ANION GAP SERPL CALCULATED.3IONS-SCNC: 23 MMOL/L (ref 5–15)
ANION GAP SERPL CALCULATED.3IONS-SCNC: 25 MMOL/L (ref 5–15)
ARTERIAL PATENCY WRIST A: ABNORMAL
AST SERPL-CCNC: 17 U/L (ref 1–32)
ATMOSPHERIC PRESS: ABNORMAL MM[HG]
B-OH-BUTYR SERPL-SCNC: 1.98 MMOL/L (ref 0.02–0.27)
BACTERIA UR QL AUTO: ABNORMAL /HPF
BACTERIA UR QL AUTO: ABNORMAL /HPF
BASE EXCESS BLDA CALC-SCNC: -4.2 MMOL/L (ref 0–2)
BASOPHILS # BLD AUTO: 0.01 10*3/MM3 (ref 0–0.2)
BASOPHILS # BLD AUTO: 0.01 10*3/MM3 (ref 0–0.2)
BASOPHILS NFR BLD AUTO: 0.1 % (ref 0–1.5)
BASOPHILS NFR BLD AUTO: 0.1 % (ref 0–1.5)
BDY SITE: ABNORMAL
BILIRUB SERPL-MCNC: 1.1 MG/DL (ref 0–1.2)
BILIRUB UR QL STRIP: NEGATIVE
BILIRUB UR QL STRIP: NEGATIVE
BODY TEMPERATURE: 37 C
BUN SERPL-MCNC: 17 MG/DL (ref 8–23)
BUN SERPL-MCNC: 18 MG/DL (ref 8–23)
BUN/CREAT SERPL: 16.5 (ref 7–25)
BUN/CREAT SERPL: 17.9 (ref 7–25)
BUN/CREAT SERPL: 18.7 (ref 7–25)
BUN/CREAT SERPL: 19.5 (ref 7–25)
CALCIUM SPEC-SCNC: 10.2 MG/DL (ref 8.6–10.5)
CALCIUM SPEC-SCNC: 10.4 MG/DL (ref 8.6–10.5)
CALCIUM SPEC-SCNC: 8.8 MG/DL (ref 8.6–10.5)
CALCIUM SPEC-SCNC: 8.9 MG/DL (ref 8.6–10.5)
CHLORIDE SERPL-SCNC: 106 MMOL/L (ref 98–107)
CHLORIDE SERPL-SCNC: 108 MMOL/L (ref 98–107)
CHLORIDE SERPL-SCNC: 95 MMOL/L (ref 98–107)
CHLORIDE SERPL-SCNC: 96 MMOL/L (ref 98–107)
CLARITY UR: ABNORMAL
CLARITY UR: CLEAR
CO2 BLDA-SCNC: 18 MMOL/L (ref 22–33)
CO2 SERPL-SCNC: 17 MMOL/L (ref 22–29)
CO2 SERPL-SCNC: 17 MMOL/L (ref 22–29)
CO2 SERPL-SCNC: 19 MMOL/L (ref 22–29)
CO2 SERPL-SCNC: 21 MMOL/L (ref 22–29)
COHGB MFR BLD: 0.5 % (ref 0–2)
COLOR UR: YELLOW
COLOR UR: YELLOW
CREAT SERPL-MCNC: 0.87 MG/DL (ref 0.57–1)
CREAT SERPL-MCNC: 0.91 MG/DL (ref 0.57–1)
CREAT SERPL-MCNC: 0.95 MG/DL (ref 0.57–1)
CREAT SERPL-MCNC: 1.09 MG/DL (ref 0.57–1)
D-LACTATE SERPL-SCNC: 1.9 MMOL/L (ref 0.5–2)
D-LACTATE SERPL-SCNC: 3.6 MMOL/L (ref 0.5–2)
D-LACTATE SERPL-SCNC: 6.9 MMOL/L (ref 0.5–2)
DEPRECATED RDW RBC AUTO: 39.8 FL (ref 37–54)
DEPRECATED RDW RBC AUTO: 40.9 FL (ref 37–54)
EOSINOPHIL # BLD AUTO: 0 10*3/MM3 (ref 0–0.4)
EOSINOPHIL # BLD AUTO: 0 10*3/MM3 (ref 0–0.4)
EOSINOPHIL NFR BLD AUTO: 0 % (ref 0.3–6.2)
EOSINOPHIL NFR BLD AUTO: 0 % (ref 0.3–6.2)
ERYTHROCYTE [DISTWIDTH] IN BLOOD BY AUTOMATED COUNT: 12.4 % (ref 12.3–15.4)
ERYTHROCYTE [DISTWIDTH] IN BLOOD BY AUTOMATED COUNT: 12.5 % (ref 12.3–15.4)
GFR SERPL CREATININE-BSD FRML MDRD: 62 ML/MIN/1.73
GFR SERPL CREATININE-BSD FRML MDRD: 72 ML/MIN/1.73
GFR SERPL CREATININE-BSD FRML MDRD: 76 ML/MIN/1.73
GFR SERPL CREATININE-BSD FRML MDRD: 80 ML/MIN/1.73
GLOBULIN UR ELPH-MCNC: 3.4 GM/DL
GLUCOSE BLDC GLUCOMTR-MCNC: 151 MG/DL (ref 70–130)
GLUCOSE BLDC GLUCOMTR-MCNC: 152 MG/DL (ref 70–130)
GLUCOSE BLDC GLUCOMTR-MCNC: 281 MG/DL (ref 70–130)
GLUCOSE BLDC GLUCOMTR-MCNC: 405 MG/DL (ref 70–130)
GLUCOSE BLDC GLUCOMTR-MCNC: 431 MG/DL (ref 70–130)
GLUCOSE BLDC GLUCOMTR-MCNC: 470 MG/DL (ref 70–130)
GLUCOSE SERPL-MCNC: 143 MG/DL (ref 65–99)
GLUCOSE SERPL-MCNC: 352 MG/DL (ref 65–99)
GLUCOSE SERPL-MCNC: 433 MG/DL (ref 65–99)
GLUCOSE SERPL-MCNC: 522 MG/DL (ref 65–99)
GLUCOSE UR STRIP-MCNC: ABNORMAL MG/DL
GLUCOSE UR STRIP-MCNC: ABNORMAL MG/DL
HBA1C MFR BLD: 10.3 % (ref 4.8–5.6)
HCO3 BLDA-SCNC: 17.3 MMOL/L (ref 20–26)
HCT VFR BLD AUTO: 39.7 % (ref 34–46.6)
HCT VFR BLD AUTO: 41.8 % (ref 34–46.6)
HCT VFR BLD CALC: 41.6 %
HGB BLD-MCNC: 13.8 G/DL (ref 12–15.9)
HGB BLD-MCNC: 14.4 G/DL (ref 12–15.9)
HGB BLDA-MCNC: 13.6 G/DL (ref 14–18)
HGB UR QL STRIP.AUTO: ABNORMAL
HGB UR QL STRIP.AUTO: ABNORMAL
HOLD SPECIMEN: NORMAL
HYALINE CASTS UR QL AUTO: ABNORMAL /LPF
HYALINE CASTS UR QL AUTO: ABNORMAL /LPF
IMM GRANULOCYTES # BLD AUTO: 0.06 10*3/MM3 (ref 0–0.05)
IMM GRANULOCYTES # BLD AUTO: 0.06 10*3/MM3 (ref 0–0.05)
IMM GRANULOCYTES NFR BLD AUTO: 0.4 % (ref 0–0.5)
IMM GRANULOCYTES NFR BLD AUTO: 0.5 % (ref 0–0.5)
INHALED O2 CONCENTRATION: 21 %
KETONES UR QL STRIP: ABNORMAL
KETONES UR QL STRIP: ABNORMAL
LEUKOCYTE ESTERASE UR QL STRIP.AUTO: ABNORMAL
LEUKOCYTE ESTERASE UR QL STRIP.AUTO: NEGATIVE
LIPASE SERPL-CCNC: 27 U/L (ref 13–60)
LYMPHOCYTES # BLD AUTO: 0.66 10*3/MM3 (ref 0.7–3.1)
LYMPHOCYTES # BLD AUTO: 0.69 10*3/MM3 (ref 0.7–3.1)
LYMPHOCYTES NFR BLD AUTO: 4.8 % (ref 19.6–45.3)
LYMPHOCYTES NFR BLD AUTO: 5.1 % (ref 19.6–45.3)
MAGNESIUM SERPL-MCNC: 1.3 MG/DL (ref 1.6–2.4)
MAGNESIUM SERPL-MCNC: 1.6 MG/DL (ref 1.6–2.4)
MAGNESIUM SERPL-MCNC: 1.8 MG/DL (ref 1.6–2.4)
MCH RBC QN AUTO: 30.2 PG (ref 26.6–33)
MCH RBC QN AUTO: 30.4 PG (ref 26.6–33)
MCHC RBC AUTO-ENTMCNC: 34.4 G/DL (ref 31.5–35.7)
MCHC RBC AUTO-ENTMCNC: 34.8 G/DL (ref 31.5–35.7)
MCV RBC AUTO: 86.9 FL (ref 79–97)
MCV RBC AUTO: 88.4 FL (ref 79–97)
METHGB BLD QL: 0.4 % (ref 0–1.5)
MODALITY: ABNORMAL
MONOCYTES # BLD AUTO: 0.17 10*3/MM3 (ref 0.1–0.9)
MONOCYTES # BLD AUTO: 0.28 10*3/MM3 (ref 0.1–0.9)
MONOCYTES NFR BLD AUTO: 1.3 % (ref 5–12)
MONOCYTES NFR BLD AUTO: 1.9 % (ref 5–12)
NEUTROPHILS NFR BLD AUTO: 11.95 10*3/MM3 (ref 1.7–7)
NEUTROPHILS NFR BLD AUTO: 13.35 10*3/MM3 (ref 1.7–7)
NEUTROPHILS NFR BLD AUTO: 92.8 % (ref 42.7–76)
NEUTROPHILS NFR BLD AUTO: 93 % (ref 42.7–76)
NITRITE UR QL STRIP: NEGATIVE
NITRITE UR QL STRIP: NEGATIVE
NOTE: ABNORMAL
NRBC BLD AUTO-RTO: 0 /100 WBC (ref 0–0.2)
NRBC BLD AUTO-RTO: 0 /100 WBC (ref 0–0.2)
OXYHGB MFR BLDV: 97.9 % (ref 94–99)
PCO2 BLDA: 23.2 MM HG (ref 35–45)
PCO2 TEMP ADJ BLD: 23.2 MM HG (ref 35–45)
PH BLDA: 7.48 PH UNITS (ref 7.35–7.45)
PH UR STRIP.AUTO: 6.5 [PH] (ref 5–8)
PH UR STRIP.AUTO: 7 [PH] (ref 5–8)
PH, TEMP CORRECTED: 7.48 PH UNITS
PHOSPHATE SERPL-MCNC: 2.4 MG/DL (ref 2.5–4.5)
PHOSPHATE SERPL-MCNC: 3.1 MG/DL (ref 2.5–4.5)
PHOSPHATE SERPL-MCNC: 3.4 MG/DL (ref 2.5–4.5)
PLATELET # BLD AUTO: 378 10*3/MM3 (ref 140–450)
PLATELET # BLD AUTO: 398 10*3/MM3 (ref 140–450)
PMV BLD AUTO: 10.3 FL (ref 6–12)
PMV BLD AUTO: 10.7 FL (ref 6–12)
PO2 BLDA: 109 MM HG (ref 83–108)
PO2 TEMP ADJ BLD: 109 MM HG (ref 83–108)
POTASSIUM SERPL-SCNC: 3 MMOL/L (ref 3.5–5.2)
POTASSIUM SERPL-SCNC: 3.1 MMOL/L (ref 3.5–5.2)
POTASSIUM SERPL-SCNC: 3.2 MMOL/L (ref 3.5–5.2)
POTASSIUM SERPL-SCNC: 3.3 MMOL/L (ref 3.5–5.2)
PROCALCITONIN SERPL-MCNC: 0.45 NG/ML (ref 0–0.25)
PROT SERPL-MCNC: 8 G/DL (ref 6–8.5)
PROT UR QL STRIP: ABNORMAL
PROT UR QL STRIP: ABNORMAL
RBC # BLD AUTO: 4.57 10*6/MM3 (ref 3.77–5.28)
RBC # BLD AUTO: 4.73 10*6/MM3 (ref 3.77–5.28)
RBC # UR: ABNORMAL /HPF
RBC # UR: ABNORMAL /HPF
REF LAB TEST METHOD: ABNORMAL
REF LAB TEST METHOD: ABNORMAL
SARS-COV-2 RNA RESP QL NAA+PROBE: NOT DETECTED
SODIUM SERPL-SCNC: 136 MMOL/L (ref 136–145)
SODIUM SERPL-SCNC: 137 MMOL/L (ref 136–145)
SODIUM SERPL-SCNC: 139 MMOL/L (ref 136–145)
SODIUM SERPL-SCNC: 141 MMOL/L (ref 136–145)
SP GR UR STRIP: 1.01 (ref 1–1.03)
SP GR UR STRIP: 1.02 (ref 1–1.03)
SQUAMOUS #/AREA URNS HPF: ABNORMAL /HPF
SQUAMOUS #/AREA URNS HPF: ABNORMAL /HPF
TROPONIN T SERPL-MCNC: <0.01 NG/ML (ref 0–0.03)
UROBILINOGEN UR QL STRIP: ABNORMAL
UROBILINOGEN UR QL STRIP: ABNORMAL
VENTILATOR MODE: ABNORMAL
WBC # BLD AUTO: 12.85 10*3/MM3 (ref 3.4–10.8)
WBC # BLD AUTO: 14.39 10*3/MM3 (ref 3.4–10.8)
WBC UR QL AUTO: ABNORMAL /HPF
WBC UR QL AUTO: ABNORMAL /HPF
WHOLE BLOOD HOLD SPECIMEN: NORMAL

## 2021-06-27 PROCEDURE — 83690 ASSAY OF LIPASE: CPT | Performed by: EMERGENCY MEDICINE

## 2021-06-27 PROCEDURE — 82375 ASSAY CARBOXYHB QUANT: CPT

## 2021-06-27 PROCEDURE — 93010 ELECTROCARDIOGRAM REPORT: CPT | Performed by: INTERNAL MEDICINE

## 2021-06-27 PROCEDURE — U0003 INFECTIOUS AGENT DETECTION BY NUCLEIC ACID (DNA OR RNA); SEVERE ACUTE RESPIRATORY SYNDROME CORONAVIRUS 2 (SARS-COV-2) (CORONAVIRUS DISEASE [COVID-19]), AMPLIFIED PROBE TECHNIQUE, MAKING USE OF HIGH THROUGHPUT TECHNOLOGIES AS DESCRIBED BY CMS-2020-01-R: HCPCS | Performed by: EMERGENCY MEDICINE

## 2021-06-27 PROCEDURE — 99284 EMERGENCY DEPT VISIT MOD MDM: CPT

## 2021-06-27 PROCEDURE — 80306 DRUG TEST PRSMV INSTRMNT: CPT | Performed by: INTERNAL MEDICINE

## 2021-06-27 PROCEDURE — 82010 KETONE BODYS QUAN: CPT | Performed by: EMERGENCY MEDICINE

## 2021-06-27 PROCEDURE — 36600 WITHDRAWAL OF ARTERIAL BLOOD: CPT

## 2021-06-27 PROCEDURE — 80053 COMPREHEN METABOLIC PANEL: CPT | Performed by: EMERGENCY MEDICINE

## 2021-06-27 PROCEDURE — 74176 CT ABD & PELVIS W/O CONTRAST: CPT

## 2021-06-27 PROCEDURE — 25010000003 POTASSIUM CHLORIDE 10 MEQ/100ML SOLUTION: Performed by: NURSE PRACTITIONER

## 2021-06-27 PROCEDURE — 93005 ELECTROCARDIOGRAM TRACING: CPT | Performed by: NURSE PRACTITIONER

## 2021-06-27 PROCEDURE — 25010000002 ONDANSETRON PER 1 MG: Performed by: EMERGENCY MEDICINE

## 2021-06-27 PROCEDURE — 87040 BLOOD CULTURE FOR BACTERIA: CPT | Performed by: EMERGENCY MEDICINE

## 2021-06-27 PROCEDURE — 82805 BLOOD GASES W/O2 SATURATION: CPT

## 2021-06-27 PROCEDURE — 84145 PROCALCITONIN (PCT): CPT | Performed by: NURSE PRACTITIONER

## 2021-06-27 PROCEDURE — 25010000003 POTASSIUM CHLORIDE 10 MEQ/100ML SOLUTION: Performed by: EMERGENCY MEDICINE

## 2021-06-27 PROCEDURE — 84484 ASSAY OF TROPONIN QUANT: CPT | Performed by: EMERGENCY MEDICINE

## 2021-06-27 PROCEDURE — 25010000002 VANCOMYCIN PER 500 MG: Performed by: EMERGENCY MEDICINE

## 2021-06-27 PROCEDURE — 82962 GLUCOSE BLOOD TEST: CPT

## 2021-06-27 PROCEDURE — 71045 X-RAY EXAM CHEST 1 VIEW: CPT

## 2021-06-27 PROCEDURE — 84100 ASSAY OF PHOSPHORUS: CPT | Performed by: EMERGENCY MEDICINE

## 2021-06-27 PROCEDURE — 85025 COMPLETE CBC W/AUTO DIFF WBC: CPT | Performed by: EMERGENCY MEDICINE

## 2021-06-27 PROCEDURE — 83036 HEMOGLOBIN GLYCOSYLATED A1C: CPT | Performed by: EMERGENCY MEDICINE

## 2021-06-27 PROCEDURE — 83605 ASSAY OF LACTIC ACID: CPT | Performed by: INTERNAL MEDICINE

## 2021-06-27 PROCEDURE — 83735 ASSAY OF MAGNESIUM: CPT | Performed by: EMERGENCY MEDICINE

## 2021-06-27 PROCEDURE — 25010000002 PIPERACILLIN SOD-TAZOBACTAM PER 1 G: Performed by: EMERGENCY MEDICINE

## 2021-06-27 PROCEDURE — 81001 URINALYSIS AUTO W/SCOPE: CPT | Performed by: EMERGENCY MEDICINE

## 2021-06-27 PROCEDURE — 25010000002 PIPERACILLIN SOD-TAZOBACTAM PER 1 G: Performed by: NURSE PRACTITIONER

## 2021-06-27 PROCEDURE — 83605 ASSAY OF LACTIC ACID: CPT | Performed by: EMERGENCY MEDICINE

## 2021-06-27 PROCEDURE — 25010000002 HEPARIN (PORCINE) PER 1000 UNITS: Performed by: NURSE PRACTITIONER

## 2021-06-27 PROCEDURE — 83050 HGB METHEMOGLOBIN QUAN: CPT

## 2021-06-27 PROCEDURE — 99223 1ST HOSP IP/OBS HIGH 75: CPT | Performed by: INTERNAL MEDICINE

## 2021-06-27 RX ORDER — LABETALOL HYDROCHLORIDE 5 MG/ML
10 INJECTION, SOLUTION INTRAVENOUS ONCE
Status: COMPLETED | OUTPATIENT
Start: 2021-06-27 | End: 2021-06-27

## 2021-06-27 RX ORDER — SODIUM CHLORIDE 9 MG/ML
10 INJECTION, SOLUTION INTRAVENOUS CONTINUOUS PRN
Status: DISCONTINUED | OUTPATIENT
Start: 2021-06-27 | End: 2021-06-28

## 2021-06-27 RX ORDER — SODIUM CHLORIDE 0.9 % (FLUSH) 0.9 %
10 SYRINGE (ML) INJECTION AS NEEDED
Status: DISCONTINUED | OUTPATIENT
Start: 2021-06-27 | End: 2021-06-28

## 2021-06-27 RX ORDER — SODIUM CHLORIDE AND POTASSIUM CHLORIDE 150; 900 MG/100ML; MG/100ML
250 INJECTION, SOLUTION INTRAVENOUS CONTINUOUS PRN
Status: DISCONTINUED | OUTPATIENT
Start: 2021-06-27 | End: 2021-06-28

## 2021-06-27 RX ORDER — POTASSIUM CHLORIDE, DEXTROSE MONOHYDRATE AND SODIUM CHLORIDE 300; 5; 900 MG/100ML; G/100ML; MG/100ML
150 INJECTION, SOLUTION INTRAVENOUS CONTINUOUS PRN
Status: DISCONTINUED | OUTPATIENT
Start: 2021-06-27 | End: 2021-06-28

## 2021-06-27 RX ORDER — DEXTROSE MONOHYDRATE 25 G/50ML
12.5 INJECTION, SOLUTION INTRAVENOUS AS NEEDED
Status: DISCONTINUED | OUTPATIENT
Start: 2021-06-27 | End: 2021-06-28

## 2021-06-27 RX ORDER — ONDANSETRON 2 MG/ML
4 INJECTION INTRAMUSCULAR; INTRAVENOUS ONCE
Status: DISCONTINUED | OUTPATIENT
Start: 2021-06-27 | End: 2021-06-27

## 2021-06-27 RX ORDER — SODIUM CHLORIDE 9 MG/ML
10 INJECTION INTRAVENOUS AS NEEDED
Status: DISCONTINUED | OUTPATIENT
Start: 2021-06-27 | End: 2021-07-03 | Stop reason: HOSPADM

## 2021-06-27 RX ORDER — SODIUM CHLORIDE 0.9 % (FLUSH) 0.9 %
10 SYRINGE (ML) INJECTION EVERY 12 HOURS SCHEDULED
Status: DISCONTINUED | OUTPATIENT
Start: 2021-06-27 | End: 2021-07-03 | Stop reason: HOSPADM

## 2021-06-27 RX ORDER — DEXTROSE MONOHYDRATE 25 G/50ML
25-50 INJECTION, SOLUTION INTRAVENOUS
Status: DISCONTINUED | OUTPATIENT
Start: 2021-06-27 | End: 2021-06-28

## 2021-06-27 RX ORDER — ONDANSETRON 2 MG/ML
4 INJECTION INTRAMUSCULAR; INTRAVENOUS ONCE
Status: COMPLETED | OUTPATIENT
Start: 2021-06-27 | End: 2021-06-27

## 2021-06-27 RX ORDER — SODIUM CHLORIDE AND POTASSIUM CHLORIDE 300; 900 MG/100ML; MG/100ML
250 INJECTION, SOLUTION INTRAVENOUS CONTINUOUS PRN
Status: DISCONTINUED | OUTPATIENT
Start: 2021-06-27 | End: 2021-06-27

## 2021-06-27 RX ORDER — SODIUM CHLORIDE 9 MG/ML
250 INJECTION, SOLUTION INTRAVENOUS CONTINUOUS PRN
Status: DISCONTINUED | OUTPATIENT
Start: 2021-06-27 | End: 2021-06-27 | Stop reason: SDUPTHER

## 2021-06-27 RX ORDER — VANCOMYCIN HYDROCHLORIDE 1 G/200ML
20 INJECTION, SOLUTION INTRAVENOUS ONCE
Status: COMPLETED | OUTPATIENT
Start: 2021-06-27 | End: 2021-06-27

## 2021-06-27 RX ORDER — DEXTROSE, SODIUM CHLORIDE, AND POTASSIUM CHLORIDE 5; .9; .15 G/100ML; G/100ML; G/100ML
150 INJECTION INTRAVENOUS CONTINUOUS PRN
Status: DISCONTINUED | OUTPATIENT
Start: 2021-06-27 | End: 2021-06-28

## 2021-06-27 RX ORDER — PANTOPRAZOLE SODIUM 40 MG/10ML
40 INJECTION, POWDER, LYOPHILIZED, FOR SOLUTION INTRAVENOUS
Status: DISCONTINUED | OUTPATIENT
Start: 2021-06-28 | End: 2021-06-29

## 2021-06-27 RX ORDER — MAGNESIUM SULFATE HEPTAHYDRATE 40 MG/ML
2 INJECTION, SOLUTION INTRAVENOUS AS NEEDED
Status: DISCONTINUED | OUTPATIENT
Start: 2021-06-27 | End: 2021-07-03 | Stop reason: HOSPADM

## 2021-06-27 RX ORDER — SODIUM CHLORIDE 9 MG/ML
250 INJECTION, SOLUTION INTRAVENOUS CONTINUOUS PRN
Status: DISCONTINUED | OUTPATIENT
Start: 2021-06-27 | End: 2021-06-28

## 2021-06-27 RX ORDER — DEXTROSE AND SODIUM CHLORIDE 5; .45 G/100ML; G/100ML
150 INJECTION, SOLUTION INTRAVENOUS CONTINUOUS PRN
Status: DISCONTINUED | OUTPATIENT
Start: 2021-06-27 | End: 2021-06-28

## 2021-06-27 RX ORDER — DEXTROSE AND SODIUM CHLORIDE 5; .9 G/100ML; G/100ML
150 INJECTION, SOLUTION INTRAVENOUS CONTINUOUS PRN
Status: DISCONTINUED | OUTPATIENT
Start: 2021-06-27 | End: 2021-06-28

## 2021-06-27 RX ORDER — POTASSIUM CHLORIDE 7.45 MG/ML
10 INJECTION INTRAVENOUS
Status: DISCONTINUED | OUTPATIENT
Start: 2021-06-27 | End: 2021-07-03 | Stop reason: HOSPADM

## 2021-06-27 RX ORDER — DEXTROSE, SODIUM CHLORIDE, AND POTASSIUM CHLORIDE 5; .45; .3 G/100ML; G/100ML; G/100ML
150 INJECTION INTRAVENOUS CONTINUOUS PRN
Status: DISCONTINUED | OUTPATIENT
Start: 2021-06-27 | End: 2021-06-27

## 2021-06-27 RX ORDER — ASPIRIN 81 MG/1
81 TABLET ORAL DAILY
Status: DISCONTINUED | OUTPATIENT
Start: 2021-06-28 | End: 2021-07-03 | Stop reason: HOSPADM

## 2021-06-27 RX ORDER — ATORVASTATIN CALCIUM 40 MG/1
80 TABLET, FILM COATED ORAL NIGHTLY
Status: DISCONTINUED | OUTPATIENT
Start: 2021-06-27 | End: 2021-07-03 | Stop reason: HOSPADM

## 2021-06-27 RX ORDER — SODIUM CHLORIDE 0.9 % (FLUSH) 0.9 %
10 SYRINGE (ML) INJECTION ONCE AS NEEDED
Status: DISCONTINUED | OUTPATIENT
Start: 2021-06-27 | End: 2021-06-28

## 2021-06-27 RX ORDER — GABAPENTIN 400 MG/1
400 CAPSULE ORAL ONCE
Status: COMPLETED | OUTPATIENT
Start: 2021-06-27 | End: 2021-06-27

## 2021-06-27 RX ORDER — SODIUM CHLORIDE AND POTASSIUM CHLORIDE 150; 450 MG/100ML; MG/100ML
250 INJECTION, SOLUTION INTRAVENOUS CONTINUOUS PRN
Status: DISCONTINUED | OUTPATIENT
Start: 2021-06-27 | End: 2021-06-27

## 2021-06-27 RX ORDER — SODIUM CHLORIDE AND POTASSIUM CHLORIDE 150; 450 MG/100ML; MG/100ML
250 INJECTION, SOLUTION INTRAVENOUS CONTINUOUS PRN
Status: DISCONTINUED | OUTPATIENT
Start: 2021-06-27 | End: 2021-06-28

## 2021-06-27 RX ORDER — POTASSIUM CHLORIDE 1.5 G/1.77G
40 POWDER, FOR SOLUTION ORAL AS NEEDED
Status: DISCONTINUED | OUTPATIENT
Start: 2021-06-27 | End: 2021-07-03 | Stop reason: HOSPADM

## 2021-06-27 RX ORDER — DEXTROSE, SODIUM CHLORIDE, AND POTASSIUM CHLORIDE 5; .45; .15 G/100ML; G/100ML; G/100ML
150 INJECTION INTRAVENOUS CONTINUOUS PRN
Status: DISCONTINUED | OUTPATIENT
Start: 2021-06-27 | End: 2021-06-27

## 2021-06-27 RX ORDER — VANCOMYCIN HYDROCHLORIDE 1 G/200ML
1000 INJECTION, SOLUTION INTRAVENOUS EVERY 24 HOURS
Status: DISCONTINUED | OUTPATIENT
Start: 2021-06-28 | End: 2021-06-28

## 2021-06-27 RX ORDER — TERAZOSIN 1 MG/1
1 CAPSULE ORAL NIGHTLY
Status: DISCONTINUED | OUTPATIENT
Start: 2021-06-27 | End: 2021-07-03 | Stop reason: HOSPADM

## 2021-06-27 RX ORDER — CHOLECALCIFEROL (VITAMIN D3) 125 MCG
5 CAPSULE ORAL NIGHTLY PRN
Status: DISCONTINUED | OUTPATIENT
Start: 2021-06-27 | End: 2021-07-03 | Stop reason: HOSPADM

## 2021-06-27 RX ORDER — FLUOXETINE HYDROCHLORIDE 20 MG/1
20 CAPSULE ORAL DAILY
Status: DISCONTINUED | OUTPATIENT
Start: 2021-06-28 | End: 2021-07-03 | Stop reason: HOSPADM

## 2021-06-27 RX ORDER — GABAPENTIN 400 MG/1
400 CAPSULE ORAL EVERY 12 HOURS SCHEDULED
Status: DISCONTINUED | OUTPATIENT
Start: 2021-06-27 | End: 2021-07-03 | Stop reason: HOSPADM

## 2021-06-27 RX ORDER — HEPARIN SODIUM 5000 [USP'U]/ML
5000 INJECTION, SOLUTION INTRAVENOUS; SUBCUTANEOUS EVERY 12 HOURS SCHEDULED
Status: DISCONTINUED | OUTPATIENT
Start: 2021-06-27 | End: 2021-07-03 | Stop reason: HOSPADM

## 2021-06-27 RX ORDER — SODIUM CHLORIDE AND POTASSIUM CHLORIDE 300; 900 MG/100ML; MG/100ML
250 INJECTION, SOLUTION INTRAVENOUS CONTINUOUS PRN
Status: DISCONTINUED | OUTPATIENT
Start: 2021-06-27 | End: 2021-06-28

## 2021-06-27 RX ORDER — POTASSIUM CHLORIDE 7.45 MG/ML
10 INJECTION INTRAVENOUS ONCE
Status: COMPLETED | OUTPATIENT
Start: 2021-06-27 | End: 2021-06-27

## 2021-06-27 RX ORDER — AMLODIPINE BESYLATE 10 MG/1
10 TABLET ORAL
Status: DISCONTINUED | OUTPATIENT
Start: 2021-06-27 | End: 2021-07-03 | Stop reason: HOSPADM

## 2021-06-27 RX ORDER — SODIUM CHLORIDE 450 MG/100ML
250 INJECTION, SOLUTION INTRAVENOUS CONTINUOUS PRN
Status: DISCONTINUED | OUTPATIENT
Start: 2021-06-27 | End: 2021-06-28

## 2021-06-27 RX ORDER — SODIUM CHLORIDE AND POTASSIUM CHLORIDE 150; 900 MG/100ML; MG/100ML
250 INJECTION, SOLUTION INTRAVENOUS CONTINUOUS PRN
Status: DISCONTINUED | OUTPATIENT
Start: 2021-06-27 | End: 2021-06-27

## 2021-06-27 RX ORDER — DEXTROSE, SODIUM CHLORIDE, AND POTASSIUM CHLORIDE 5; .45; .3 G/100ML; G/100ML; G/100ML
150 INJECTION INTRAVENOUS CONTINUOUS PRN
Status: DISCONTINUED | OUTPATIENT
Start: 2021-06-27 | End: 2021-06-28

## 2021-06-27 RX ORDER — MAGNESIUM SULFATE HEPTAHYDRATE 40 MG/ML
4 INJECTION, SOLUTION INTRAVENOUS AS NEEDED
Status: DISCONTINUED | OUTPATIENT
Start: 2021-06-27 | End: 2021-07-03 | Stop reason: HOSPADM

## 2021-06-27 RX ORDER — ACETAMINOPHEN 650 MG/1
650 SUPPOSITORY RECTAL EVERY 4 HOURS PRN
Status: DISCONTINUED | OUTPATIENT
Start: 2021-06-27 | End: 2021-07-03 | Stop reason: HOSPADM

## 2021-06-27 RX ORDER — ACETAMINOPHEN 160 MG/5ML
650 SOLUTION ORAL EVERY 4 HOURS PRN
Status: DISCONTINUED | OUTPATIENT
Start: 2021-06-27 | End: 2021-07-03 | Stop reason: HOSPADM

## 2021-06-27 RX ORDER — POTASSIUM CHLORIDE, DEXTROSE MONOHYDRATE AND SODIUM CHLORIDE 300; 5; 900 MG/100ML; G/100ML; MG/100ML
150 INJECTION, SOLUTION INTRAVENOUS CONTINUOUS PRN
Status: DISCONTINUED | OUTPATIENT
Start: 2021-06-27 | End: 2021-06-27

## 2021-06-27 RX ORDER — POTASSIUM CHLORIDE 750 MG/1
40 CAPSULE, EXTENDED RELEASE ORAL AS NEEDED
Status: DISCONTINUED | OUTPATIENT
Start: 2021-06-27 | End: 2021-07-03 | Stop reason: HOSPADM

## 2021-06-27 RX ORDER — MEGESTROL ACETATE 40 MG/1
40 TABLET ORAL DAILY
Status: DISCONTINUED | OUTPATIENT
Start: 2021-06-28 | End: 2021-07-03 | Stop reason: HOSPADM

## 2021-06-27 RX ORDER — ACETAMINOPHEN 325 MG/1
650 TABLET ORAL EVERY 4 HOURS PRN
Status: DISCONTINUED | OUTPATIENT
Start: 2021-06-27 | End: 2021-07-03 | Stop reason: HOSPADM

## 2021-06-27 RX ORDER — SODIUM CHLORIDE 0.9 % (FLUSH) 0.9 %
3 SYRINGE (ML) INJECTION EVERY 12 HOURS SCHEDULED
Status: DISCONTINUED | OUTPATIENT
Start: 2021-06-27 | End: 2021-06-28

## 2021-06-27 RX ORDER — SODIUM CHLORIDE 0.9 % (FLUSH) 0.9 %
10 SYRINGE (ML) INJECTION AS NEEDED
Status: DISCONTINUED | OUTPATIENT
Start: 2021-06-27 | End: 2021-07-03 | Stop reason: HOSPADM

## 2021-06-27 RX ORDER — DEXTROSE, SODIUM CHLORIDE, AND POTASSIUM CHLORIDE 5; .45; .15 G/100ML; G/100ML; G/100ML
150 INJECTION INTRAVENOUS CONTINUOUS PRN
Status: DISCONTINUED | OUTPATIENT
Start: 2021-06-27 | End: 2021-06-28

## 2021-06-27 RX ORDER — TRAZODONE HYDROCHLORIDE 50 MG/1
50 TABLET ORAL NIGHTLY PRN
Status: DISCONTINUED | OUTPATIENT
Start: 2021-06-27 | End: 2021-07-03 | Stop reason: HOSPADM

## 2021-06-27 RX ORDER — DEXTROSE, SODIUM CHLORIDE, AND POTASSIUM CHLORIDE 5; .9; .15 G/100ML; G/100ML; G/100ML
150 INJECTION INTRAVENOUS CONTINUOUS PRN
Status: DISCONTINUED | OUTPATIENT
Start: 2021-06-27 | End: 2021-06-27

## 2021-06-27 RX ORDER — SODIUM CHLORIDE 450 MG/100ML
250 INJECTION, SOLUTION INTRAVENOUS CONTINUOUS PRN
Status: DISCONTINUED | OUTPATIENT
Start: 2021-06-27 | End: 2021-06-27 | Stop reason: SDUPTHER

## 2021-06-27 RX ORDER — VANCOMYCIN HYDROCHLORIDE 1 G/200ML
20 INJECTION, SOLUTION INTRAVENOUS ONCE
Status: DISCONTINUED | OUTPATIENT
Start: 2021-06-27 | End: 2021-06-27 | Stop reason: SDUPTHER

## 2021-06-27 RX ADMIN — ONDANSETRON 4 MG: 2 INJECTION INTRAMUSCULAR; INTRAVENOUS at 14:21

## 2021-06-27 RX ADMIN — NICARDIPINE HYDROCHLORIDE 5 MG/HR: 0.1 INJECTION, SOLUTION INTRAVENOUS at 17:40

## 2021-06-27 RX ADMIN — POTASSIUM CHLORIDE 40 MEQ: 750 CAPSULE, EXTENDED RELEASE ORAL at 22:46

## 2021-06-27 RX ADMIN — SODIUM CHLORIDE, PRESERVATIVE FREE 3 ML: 5 INJECTION INTRAVENOUS at 23:02

## 2021-06-27 RX ADMIN — TAZOBACTAM SODIUM AND PIPERACILLIN SODIUM 3.38 G: 375; 3 INJECTION, SOLUTION INTRAVENOUS at 20:56

## 2021-06-27 RX ADMIN — HEPARIN SODIUM 5000 UNITS: 5000 INJECTION INTRAVENOUS; SUBCUTANEOUS at 22:45

## 2021-06-27 RX ADMIN — VANCOMYCIN HYDROCHLORIDE 1000 MG: 1 INJECTION, SOLUTION INTRAVENOUS at 16:20

## 2021-06-27 RX ADMIN — DEXTROSE MONOHYDRATE, SODIUM CHLORIDE, AND POTASSIUM CHLORIDE 100 ML/HR: 50; 9; 1.49 INJECTION, SOLUTION INTRAVENOUS at 22:57

## 2021-06-27 RX ADMIN — GABAPENTIN 400 MG: 400 CAPSULE ORAL at 22:45

## 2021-06-27 RX ADMIN — SODIUM CHLORIDE 1647 ML: 9 INJECTION, SOLUTION INTRAVENOUS at 15:18

## 2021-06-27 RX ADMIN — ATORVASTATIN CALCIUM 80 MG: 40 TABLET, FILM COATED ORAL at 22:45

## 2021-06-27 RX ADMIN — POTASSIUM CHLORIDE 10 MEQ: 7.46 INJECTION, SOLUTION INTRAVENOUS at 17:57

## 2021-06-27 RX ADMIN — TERAZOSIN HYDROCHLORIDE 1 MG: 1 CAPSULE ORAL at 22:45

## 2021-06-27 RX ADMIN — TAZOBACTAM SODIUM AND PIPERACILLIN SODIUM 3.38 G: 375; 3 INJECTION, SOLUTION INTRAVENOUS at 15:17

## 2021-06-27 RX ADMIN — POTASSIUM CHLORIDE 10 MEQ: 7.46 INJECTION, SOLUTION INTRAVENOUS at 15:22

## 2021-06-27 RX ADMIN — SODIUM CHLORIDE, PRESERVATIVE FREE 10 ML: 5 INJECTION INTRAVENOUS at 23:02

## 2021-06-27 RX ADMIN — POTASSIUM CHLORIDE 10 MEQ: 7.46 INJECTION, SOLUTION INTRAVENOUS at 16:46

## 2021-06-27 RX ADMIN — LABETALOL 20 MG/4 ML (5 MG/ML) INTRAVENOUS SYRINGE 10 MG: at 16:20

## 2021-06-27 RX ADMIN — TRAZODONE HYDROCHLORIDE 50 MG: 50 TABLET ORAL at 22:45

## 2021-06-27 RX ADMIN — GABAPENTIN 400 MG: 400 CAPSULE ORAL at 23:00

## 2021-06-27 RX ADMIN — SODIUM CHLORIDE 1000 ML: 9 INJECTION, SOLUTION INTRAVENOUS at 14:19

## 2021-06-27 RX ADMIN — INSULIN HUMAN 5 UNITS/HR: 1 INJECTION, SOLUTION INTRAVENOUS at 19:24

## 2021-06-28 ENCOUNTER — OUTSIDE FACILITY SERVICE (OUTPATIENT)
Dept: GASTROENTEROLOGY | Facility: CLINIC | Age: 61
End: 2021-06-28

## 2021-06-28 LAB
AMPHET+METHAMPHET UR QL: NEGATIVE
AMPHETAMINES UR QL: NEGATIVE
ANION GAP SERPL CALCULATED.3IONS-SCNC: 12 MMOL/L (ref 5–15)
ANION GAP SERPL CALCULATED.3IONS-SCNC: 13 MMOL/L (ref 5–15)
BARBITURATES UR QL SCN: NEGATIVE
BASOPHILS # BLD AUTO: 0.01 10*3/MM3 (ref 0–0.2)
BASOPHILS NFR BLD AUTO: 0.1 % (ref 0–1.5)
BENZODIAZ UR QL SCN: NEGATIVE
BUN SERPL-MCNC: 19 MG/DL (ref 8–23)
BUN SERPL-MCNC: 20 MG/DL (ref 8–23)
BUN/CREAT SERPL: 21.8 (ref 7–25)
BUN/CREAT SERPL: 22.5 (ref 7–25)
BUPRENORPHINE SERPL-MCNC: NEGATIVE NG/ML
CALCIUM SPEC-SCNC: 8.7 MG/DL (ref 8.6–10.5)
CALCIUM SPEC-SCNC: 8.9 MG/DL (ref 8.6–10.5)
CANNABINOIDS SERPL QL: POSITIVE
CHLORIDE SERPL-SCNC: 105 MMOL/L (ref 98–107)
CHLORIDE SERPL-SCNC: 106 MMOL/L (ref 98–107)
CO2 SERPL-SCNC: 20 MMOL/L (ref 22–29)
CO2 SERPL-SCNC: 21 MMOL/L (ref 22–29)
COCAINE UR QL: NEGATIVE
CREAT SERPL-MCNC: 0.87 MG/DL (ref 0.57–1)
CREAT SERPL-MCNC: 0.89 MG/DL (ref 0.57–1)
D-LACTATE SERPL-SCNC: 0.8 MMOL/L (ref 0.5–2)
DEPRECATED RDW RBC AUTO: 42.5 FL (ref 37–54)
EOSINOPHIL # BLD AUTO: 0 10*3/MM3 (ref 0–0.4)
EOSINOPHIL NFR BLD AUTO: 0 % (ref 0.3–6.2)
ERYTHROCYTE [DISTWIDTH] IN BLOOD BY AUTOMATED COUNT: 12.9 % (ref 12.3–15.4)
GFR SERPL CREATININE-BSD FRML MDRD: 78 ML/MIN/1.73
GFR SERPL CREATININE-BSD FRML MDRD: 80 ML/MIN/1.73
GLUCOSE BLDC GLUCOMTR-MCNC: 167 MG/DL (ref 70–130)
GLUCOSE BLDC GLUCOMTR-MCNC: 167 MG/DL (ref 70–130)
GLUCOSE BLDC GLUCOMTR-MCNC: 169 MG/DL (ref 70–130)
GLUCOSE BLDC GLUCOMTR-MCNC: 194 MG/DL (ref 70–130)
GLUCOSE BLDC GLUCOMTR-MCNC: 199 MG/DL (ref 70–130)
GLUCOSE BLDC GLUCOMTR-MCNC: 207 MG/DL (ref 70–130)
GLUCOSE BLDC GLUCOMTR-MCNC: 216 MG/DL (ref 70–130)
GLUCOSE BLDC GLUCOMTR-MCNC: 242 MG/DL (ref 70–130)
GLUCOSE SERPL-MCNC: 216 MG/DL (ref 65–99)
GLUCOSE SERPL-MCNC: 225 MG/DL (ref 65–99)
HCT VFR BLD AUTO: 33.1 % (ref 34–46.6)
HGB BLD-MCNC: 11 G/DL (ref 12–15.9)
IMM GRANULOCYTES # BLD AUTO: 0.06 10*3/MM3 (ref 0–0.05)
IMM GRANULOCYTES NFR BLD AUTO: 0.5 % (ref 0–0.5)
LYMPHOCYTES # BLD AUTO: 1.32 10*3/MM3 (ref 0.7–3.1)
LYMPHOCYTES NFR BLD AUTO: 12.1 % (ref 19.6–45.3)
MAGNESIUM SERPL-MCNC: 1.4 MG/DL (ref 1.6–2.4)
MAGNESIUM SERPL-MCNC: 1.6 MG/DL (ref 1.6–2.4)
MAGNESIUM SERPL-MCNC: 1.7 MG/DL (ref 1.6–2.4)
MCH RBC QN AUTO: 30.1 PG (ref 26.6–33)
MCHC RBC AUTO-ENTMCNC: 33.2 G/DL (ref 31.5–35.7)
MCV RBC AUTO: 90.7 FL (ref 79–97)
METHADONE UR QL SCN: NEGATIVE
MONOCYTES # BLD AUTO: 0.59 10*3/MM3 (ref 0.1–0.9)
MONOCYTES NFR BLD AUTO: 5.4 % (ref 5–12)
NEUTROPHILS NFR BLD AUTO: 8.93 10*3/MM3 (ref 1.7–7)
NEUTROPHILS NFR BLD AUTO: 81.9 % (ref 42.7–76)
NRBC BLD AUTO-RTO: 0 /100 WBC (ref 0–0.2)
OPIATES UR QL: NEGATIVE
OXYCODONE UR QL SCN: NEGATIVE
PCP UR QL SCN: NEGATIVE
PHOSPHATE SERPL-MCNC: 1.8 MG/DL (ref 2.5–4.5)
PHOSPHATE SERPL-MCNC: 3.4 MG/DL (ref 2.5–4.5)
PHOSPHATE SERPL-MCNC: 3.5 MG/DL (ref 2.5–4.5)
PLATELET # BLD AUTO: 299 10*3/MM3 (ref 140–450)
PMV BLD AUTO: 10.4 FL (ref 6–12)
POTASSIUM SERPL-SCNC: 3.8 MMOL/L (ref 3.5–5.2)
POTASSIUM SERPL-SCNC: 4 MMOL/L (ref 3.5–5.2)
PROPOXYPH UR QL: NEGATIVE
QT INTERVAL: 424 MS
QTC INTERVAL: 541 MS
RBC # BLD AUTO: 3.65 10*6/MM3 (ref 3.77–5.28)
SODIUM SERPL-SCNC: 138 MMOL/L (ref 136–145)
SODIUM SERPL-SCNC: 139 MMOL/L (ref 136–145)
TRICYCLICS UR QL SCN: NEGATIVE
WBC # BLD AUTO: 10.91 10*3/MM3 (ref 3.4–10.8)

## 2021-06-28 PROCEDURE — 84100 ASSAY OF PHOSPHORUS: CPT | Performed by: EMERGENCY MEDICINE

## 2021-06-28 PROCEDURE — 85025 COMPLETE CBC W/AUTO DIFF WBC: CPT | Performed by: NURSE PRACTITIONER

## 2021-06-28 PROCEDURE — 63710000001 INSULIN LISPRO (HUMAN) PER 5 UNITS: Performed by: INTERNAL MEDICINE

## 2021-06-28 PROCEDURE — 82962 GLUCOSE BLOOD TEST: CPT

## 2021-06-28 PROCEDURE — 25010000002 MAGNESIUM SULFATE 2 GM/50ML SOLUTION: Performed by: NURSE PRACTITIONER

## 2021-06-28 PROCEDURE — 99233 SBSQ HOSP IP/OBS HIGH 50: CPT | Performed by: INTERNAL MEDICINE

## 2021-06-28 PROCEDURE — 25010000002 HEPARIN (PORCINE) PER 1000 UNITS: Performed by: NURSE PRACTITIONER

## 2021-06-28 PROCEDURE — 63710000001 INSULIN DETEMIR PER 5 UNITS: Performed by: INTERNAL MEDICINE

## 2021-06-28 PROCEDURE — 80048 BASIC METABOLIC PNL TOTAL CA: CPT | Performed by: EMERGENCY MEDICINE

## 2021-06-28 PROCEDURE — 83735 ASSAY OF MAGNESIUM: CPT | Performed by: EMERGENCY MEDICINE

## 2021-06-28 PROCEDURE — G0108 DIAB MANAGE TRN  PER INDIV: HCPCS | Performed by: REGISTERED NURSE

## 2021-06-28 PROCEDURE — 83605 ASSAY OF LACTIC ACID: CPT | Performed by: NURSE PRACTITIONER

## 2021-06-28 RX ORDER — NICOTINE POLACRILEX 4 MG
15 LOZENGE BUCCAL
Status: DISCONTINUED | OUTPATIENT
Start: 2021-06-28 | End: 2021-07-03 | Stop reason: HOSPADM

## 2021-06-28 RX ORDER — DEXTROSE MONOHYDRATE 25 G/50ML
25 INJECTION, SOLUTION INTRAVENOUS
Status: DISCONTINUED | OUTPATIENT
Start: 2021-06-28 | End: 2021-07-03 | Stop reason: HOSPADM

## 2021-06-28 RX ORDER — SODIUM CHLORIDE, SODIUM LACTATE, POTASSIUM CHLORIDE, CALCIUM CHLORIDE 600; 310; 30; 20 MG/100ML; MG/100ML; MG/100ML; MG/100ML
75 INJECTION, SOLUTION INTRAVENOUS CONTINUOUS
Status: DISCONTINUED | OUTPATIENT
Start: 2021-06-28 | End: 2021-06-28

## 2021-06-28 RX ADMIN — HEPARIN SODIUM 5000 UNITS: 5000 INJECTION INTRAVENOUS; SUBCUTANEOUS at 08:09

## 2021-06-28 RX ADMIN — HEPARIN SODIUM 5000 UNITS: 5000 INJECTION INTRAVENOUS; SUBCUTANEOUS at 21:31

## 2021-06-28 RX ADMIN — INSULIN DETEMIR 10 UNITS: 100 INJECTION, SOLUTION SUBCUTANEOUS at 00:46

## 2021-06-28 RX ADMIN — INSULIN LISPRO 2 UNITS: 100 INJECTION, SOLUTION INTRAVENOUS; SUBCUTANEOUS at 11:28

## 2021-06-28 RX ADMIN — SODIUM CHLORIDE, POTASSIUM CHLORIDE, SODIUM LACTATE AND CALCIUM CHLORIDE 75 ML/HR: 600; 310; 30; 20 INJECTION, SOLUTION INTRAVENOUS at 05:57

## 2021-06-28 RX ADMIN — INSULIN LISPRO 3 UNITS: 100 INJECTION, SOLUTION INTRAVENOUS; SUBCUTANEOUS at 16:56

## 2021-06-28 RX ADMIN — ATORVASTATIN CALCIUM 80 MG: 40 TABLET, FILM COATED ORAL at 21:31

## 2021-06-28 RX ADMIN — FLUOXETINE HYDROCHLORIDE 20 MG: 20 CAPSULE ORAL at 08:09

## 2021-06-28 RX ADMIN — INSULIN LISPRO 2 UNITS: 100 INJECTION, SOLUTION INTRAVENOUS; SUBCUTANEOUS at 09:21

## 2021-06-28 RX ADMIN — GABAPENTIN 400 MG: 400 CAPSULE ORAL at 08:09

## 2021-06-28 RX ADMIN — MAGNESIUM SULFATE HEPTAHYDRATE 2 G: 40 INJECTION, SOLUTION INTRAVENOUS at 08:29

## 2021-06-28 RX ADMIN — PANTOPRAZOLE SODIUM 40 MG: 40 INJECTION, POWDER, FOR SOLUTION INTRAVENOUS at 05:09

## 2021-06-28 RX ADMIN — INSULIN LISPRO 2 UNITS: 100 INJECTION, SOLUTION INTRAVENOUS; SUBCUTANEOUS at 11:29

## 2021-06-28 RX ADMIN — GABAPENTIN 400 MG: 400 CAPSULE ORAL at 21:31

## 2021-06-28 RX ADMIN — SODIUM CHLORIDE, PRESERVATIVE FREE 10 ML: 5 INJECTION INTRAVENOUS at 21:43

## 2021-06-28 RX ADMIN — INSULIN LISPRO 2 UNITS: 100 INJECTION, SOLUTION INTRAVENOUS; SUBCUTANEOUS at 21:40

## 2021-06-28 RX ADMIN — POTASSIUM CHLORIDE 40 MEQ: 750 CAPSULE, EXTENDED RELEASE ORAL at 03:07

## 2021-06-28 RX ADMIN — TERAZOSIN HYDROCHLORIDE 1 MG: 1 CAPSULE ORAL at 21:31

## 2021-06-28 RX ADMIN — ASPIRIN 81 MG: 81 TABLET, COATED ORAL at 08:09

## 2021-06-28 RX ADMIN — POTASSIUM & SODIUM PHOSPHATES POWDER PACK 280-160-250 MG 2 PACKET: 280-160-250 PACK at 00:46

## 2021-06-28 RX ADMIN — INSULIN LISPRO 3 UNITS: 100 INJECTION, SOLUTION INTRAVENOUS; SUBCUTANEOUS at 08:09

## 2021-06-28 RX ADMIN — INSULIN DETEMIR 10 UNITS: 100 INJECTION, SOLUTION SUBCUTANEOUS at 21:40

## 2021-06-28 RX ADMIN — MEGESTROL ACETATE 40 MG: 40 TABLET ORAL at 08:13

## 2021-06-28 RX ADMIN — AMLODIPINE BESYLATE 10 MG: 10 TABLET ORAL at 08:09

## 2021-06-28 RX ADMIN — INSULIN LISPRO 2 UNITS: 100 INJECTION, SOLUTION INTRAVENOUS; SUBCUTANEOUS at 16:57

## 2021-06-29 PROBLEM — R11.10 NON-INTRACTABLE VOMITING: Status: ACTIVE | Noted: 2021-06-27

## 2021-06-29 LAB
ANION GAP SERPL CALCULATED.3IONS-SCNC: 11 MMOL/L (ref 5–15)
BUN SERPL-MCNC: 17 MG/DL (ref 8–23)
BUN/CREAT SERPL: 20.7 (ref 7–25)
CALCIUM SPEC-SCNC: 8.7 MG/DL (ref 8.6–10.5)
CHLORIDE SERPL-SCNC: 106 MMOL/L (ref 98–107)
CO2 SERPL-SCNC: 23 MMOL/L (ref 22–29)
CREAT SERPL-MCNC: 0.82 MG/DL (ref 0.57–1)
DEPRECATED RDW RBC AUTO: 43.1 FL (ref 37–54)
ERYTHROCYTE [DISTWIDTH] IN BLOOD BY AUTOMATED COUNT: 12.8 % (ref 12.3–15.4)
GFR SERPL CREATININE-BSD FRML MDRD: 86 ML/MIN/1.73
GLUCOSE BLDC GLUCOMTR-MCNC: 149 MG/DL (ref 70–130)
GLUCOSE BLDC GLUCOMTR-MCNC: 186 MG/DL (ref 70–130)
GLUCOSE BLDC GLUCOMTR-MCNC: 195 MG/DL (ref 70–130)
GLUCOSE BLDC GLUCOMTR-MCNC: 92 MG/DL (ref 70–130)
GLUCOSE SERPL-MCNC: 155 MG/DL (ref 65–99)
HCT VFR BLD AUTO: 35.8 % (ref 34–46.6)
HGB BLD-MCNC: 12 G/DL (ref 12–15.9)
MAGNESIUM SERPL-MCNC: 2.7 MG/DL (ref 1.6–2.4)
MCH RBC QN AUTO: 30.5 PG (ref 26.6–33)
MCHC RBC AUTO-ENTMCNC: 33.5 G/DL (ref 31.5–35.7)
MCV RBC AUTO: 91.1 FL (ref 79–97)
PLATELET # BLD AUTO: 283 10*3/MM3 (ref 140–450)
PMV BLD AUTO: 10.2 FL (ref 6–12)
POTASSIUM SERPL-SCNC: 3.5 MMOL/L (ref 3.5–5.2)
RBC # BLD AUTO: 3.93 10*6/MM3 (ref 3.77–5.28)
SODIUM SERPL-SCNC: 140 MMOL/L (ref 136–145)
WBC # BLD AUTO: 10.65 10*3/MM3 (ref 3.4–10.8)

## 2021-06-29 PROCEDURE — 80048 BASIC METABOLIC PNL TOTAL CA: CPT

## 2021-06-29 PROCEDURE — 63710000001 INSULIN DETEMIR PER 5 UNITS: Performed by: PHYSICIAN ASSISTANT

## 2021-06-29 PROCEDURE — 25010000002 HEPARIN (PORCINE) PER 1000 UNITS: Performed by: NURSE PRACTITIONER

## 2021-06-29 PROCEDURE — 63710000001 INSULIN LISPRO (HUMAN) PER 5 UNITS: Performed by: INTERNAL MEDICINE

## 2021-06-29 PROCEDURE — 99232 SBSQ HOSP IP/OBS MODERATE 35: CPT | Performed by: PHYSICIAN ASSISTANT

## 2021-06-29 PROCEDURE — 25010000003 POTASSIUM CHLORIDE 10 MEQ/100ML SOLUTION: Performed by: NURSE PRACTITIONER

## 2021-06-29 PROCEDURE — 99222 1ST HOSP IP/OBS MODERATE 55: CPT | Performed by: PHYSICIAN ASSISTANT

## 2021-06-29 PROCEDURE — 25010000002 METOCLOPRAMIDE PER 10 MG: Performed by: PHYSICIAN ASSISTANT

## 2021-06-29 PROCEDURE — 83735 ASSAY OF MAGNESIUM: CPT | Performed by: INTERNAL MEDICINE

## 2021-06-29 PROCEDURE — 63710000001 INSULIN LISPRO (HUMAN) PER 5 UNITS: Performed by: PHYSICIAN ASSISTANT

## 2021-06-29 PROCEDURE — 85027 COMPLETE CBC AUTOMATED: CPT | Performed by: INTERNAL MEDICINE

## 2021-06-29 PROCEDURE — 82962 GLUCOSE BLOOD TEST: CPT

## 2021-06-29 RX ORDER — PANTOPRAZOLE SODIUM 40 MG/10ML
40 INJECTION, POWDER, LYOPHILIZED, FOR SOLUTION INTRAVENOUS
Status: DISCONTINUED | OUTPATIENT
Start: 2021-06-29 | End: 2021-06-30

## 2021-06-29 RX ORDER — ONDANSETRON 2 MG/ML
4 INJECTION INTRAMUSCULAR; INTRAVENOUS EVERY 6 HOURS PRN
Status: DISCONTINUED | OUTPATIENT
Start: 2021-06-29 | End: 2021-07-03 | Stop reason: HOSPADM

## 2021-06-29 RX ORDER — METOCLOPRAMIDE HYDROCHLORIDE 5 MG/ML
5 INJECTION INTRAMUSCULAR; INTRAVENOUS
Status: DISCONTINUED | OUTPATIENT
Start: 2021-06-29 | End: 2021-06-30

## 2021-06-29 RX ORDER — LISINOPRIL 5 MG/1
5 TABLET ORAL
Status: DISCONTINUED | OUTPATIENT
Start: 2021-06-29 | End: 2021-06-29

## 2021-06-29 RX ORDER — ONDANSETRON 4 MG/1
4 TABLET, FILM COATED ORAL EVERY 6 HOURS PRN
Status: DISCONTINUED | OUTPATIENT
Start: 2021-06-29 | End: 2021-07-03 | Stop reason: HOSPADM

## 2021-06-29 RX ORDER — METOPROLOL TARTRATE 5 MG/5ML
5 INJECTION INTRAVENOUS EVERY 6 HOURS PRN
Status: DISCONTINUED | OUTPATIENT
Start: 2021-06-29 | End: 2021-07-01

## 2021-06-29 RX ADMIN — POTASSIUM CHLORIDE 10 MEQ: 7.46 INJECTION, SOLUTION INTRAVENOUS at 19:07

## 2021-06-29 RX ADMIN — INSULIN LISPRO 2 UNITS: 100 INJECTION, SOLUTION INTRAVENOUS; SUBCUTANEOUS at 08:58

## 2021-06-29 RX ADMIN — POTASSIUM CHLORIDE 10 MEQ: 7.46 INJECTION, SOLUTION INTRAVENOUS at 20:30

## 2021-06-29 RX ADMIN — MEGESTROL ACETATE 40 MG: 40 TABLET ORAL at 08:58

## 2021-06-29 RX ADMIN — PANTOPRAZOLE SODIUM 40 MG: 40 INJECTION, POWDER, FOR SOLUTION INTRAVENOUS at 16:42

## 2021-06-29 RX ADMIN — ATORVASTATIN CALCIUM 80 MG: 40 TABLET, FILM COATED ORAL at 21:13

## 2021-06-29 RX ADMIN — METOCLOPRAMIDE 5 MG: 5 INJECTION, SOLUTION INTRAMUSCULAR; INTRAVENOUS at 21:15

## 2021-06-29 RX ADMIN — SODIUM CHLORIDE, PRESERVATIVE FREE 10 ML: 5 INJECTION INTRAVENOUS at 08:58

## 2021-06-29 RX ADMIN — TERAZOSIN HYDROCHLORIDE 1 MG: 1 CAPSULE ORAL at 21:17

## 2021-06-29 RX ADMIN — ACETAMINOPHEN 650 MG: 325 TABLET, FILM COATED ORAL at 01:01

## 2021-06-29 RX ADMIN — PANTOPRAZOLE SODIUM 40 MG: 40 INJECTION, POWDER, FOR SOLUTION INTRAVENOUS at 05:57

## 2021-06-29 RX ADMIN — GABAPENTIN 400 MG: 400 CAPSULE ORAL at 21:13

## 2021-06-29 RX ADMIN — POTASSIUM CHLORIDE 10 MEQ: 7.46 INJECTION, SOLUTION INTRAVENOUS at 16:43

## 2021-06-29 RX ADMIN — INSULIN LISPRO 3 UNITS: 100 INJECTION, SOLUTION INTRAVENOUS; SUBCUTANEOUS at 08:59

## 2021-06-29 RX ADMIN — METOCLOPRAMIDE 5 MG: 5 INJECTION, SOLUTION INTRAMUSCULAR; INTRAVENOUS at 16:42

## 2021-06-29 RX ADMIN — SODIUM CHLORIDE, PRESERVATIVE FREE 10 ML: 5 INJECTION INTRAVENOUS at 21:16

## 2021-06-29 RX ADMIN — GABAPENTIN 400 MG: 400 CAPSULE ORAL at 08:58

## 2021-06-29 RX ADMIN — AMLODIPINE BESYLATE 10 MG: 10 TABLET ORAL at 08:58

## 2021-06-29 RX ADMIN — POTASSIUM CHLORIDE 10 MEQ: 7.46 INJECTION, SOLUTION INTRAVENOUS at 15:43

## 2021-06-29 RX ADMIN — INSULIN LISPRO 2 UNITS: 100 INJECTION, SOLUTION INTRAVENOUS; SUBCUTANEOUS at 21:16

## 2021-06-29 RX ADMIN — INSULIN DETEMIR 12 UNITS: 100 INJECTION, SOLUTION SUBCUTANEOUS at 21:17

## 2021-06-29 RX ADMIN — HEPARIN SODIUM 5000 UNITS: 5000 INJECTION INTRAVENOUS; SUBCUTANEOUS at 21:15

## 2021-06-29 RX ADMIN — FLUOXETINE HYDROCHLORIDE 20 MG: 20 CAPSULE ORAL at 08:58

## 2021-06-29 RX ADMIN — METOCLOPRAMIDE 5 MG: 5 INJECTION, SOLUTION INTRAMUSCULAR; INTRAVENOUS at 14:29

## 2021-06-30 ENCOUNTER — ANESTHESIA (OUTPATIENT)
Dept: GASTROENTEROLOGY | Facility: HOSPITAL | Age: 61
End: 2021-06-30

## 2021-06-30 ENCOUNTER — ANESTHESIA EVENT (OUTPATIENT)
Dept: GASTROENTEROLOGY | Facility: HOSPITAL | Age: 61
End: 2021-06-30

## 2021-06-30 LAB
ANION GAP SERPL CALCULATED.3IONS-SCNC: 13 MMOL/L (ref 5–15)
BUN SERPL-MCNC: 18 MG/DL (ref 8–23)
BUN/CREAT SERPL: 19.8 (ref 7–25)
CALCIUM SPEC-SCNC: 9.3 MG/DL (ref 8.6–10.5)
CHLORIDE SERPL-SCNC: 108 MMOL/L (ref 98–107)
CO2 SERPL-SCNC: 20 MMOL/L (ref 22–29)
CREAT SERPL-MCNC: 0.91 MG/DL (ref 0.57–1)
GFR SERPL CREATININE-BSD FRML MDRD: 76 ML/MIN/1.73
GLUCOSE BLDC GLUCOMTR-MCNC: 113 MG/DL (ref 70–130)
GLUCOSE BLDC GLUCOMTR-MCNC: 123 MG/DL (ref 70–130)
GLUCOSE BLDC GLUCOMTR-MCNC: 124 MG/DL (ref 70–130)
GLUCOSE BLDC GLUCOMTR-MCNC: 157 MG/DL (ref 70–130)
GLUCOSE BLDC GLUCOMTR-MCNC: 166 MG/DL (ref 70–130)
GLUCOSE SERPL-MCNC: 160 MG/DL (ref 65–99)
POTASSIUM SERPL-SCNC: 3.8 MMOL/L (ref 3.5–5.2)
POTASSIUM SERPL-SCNC: 3.9 MMOL/L (ref 3.5–5.2)
SODIUM SERPL-SCNC: 141 MMOL/L (ref 136–145)

## 2021-06-30 PROCEDURE — 63710000001 INSULIN LISPRO (HUMAN) PER 5 UNITS: Performed by: INTERNAL MEDICINE

## 2021-06-30 PROCEDURE — 88305 TISSUE EXAM BY PATHOLOGIST: CPT | Performed by: INTERNAL MEDICINE

## 2021-06-30 PROCEDURE — 43239 EGD BIOPSY SINGLE/MULTIPLE: CPT | Performed by: INTERNAL MEDICINE

## 2021-06-30 PROCEDURE — 99232 SBSQ HOSP IP/OBS MODERATE 35: CPT | Performed by: PHYSICIAN ASSISTANT

## 2021-06-30 PROCEDURE — 25010000002 HEPARIN (PORCINE) PER 1000 UNITS: Performed by: NURSE PRACTITIONER

## 2021-06-30 PROCEDURE — 25010000002 METOCLOPRAMIDE PER 10 MG: Performed by: PHYSICIAN ASSISTANT

## 2021-06-30 PROCEDURE — 82962 GLUCOSE BLOOD TEST: CPT

## 2021-06-30 PROCEDURE — 80048 BASIC METABOLIC PNL TOTAL CA: CPT | Performed by: PHYSICIAN ASSISTANT

## 2021-06-30 PROCEDURE — 0DB68ZX EXCISION OF STOMACH, VIA NATURAL OR ARTIFICIAL OPENING ENDOSCOPIC, DIAGNOSTIC: ICD-10-PCS | Performed by: INTERNAL MEDICINE

## 2021-06-30 PROCEDURE — 84132 ASSAY OF SERUM POTASSIUM: CPT | Performed by: INTERNAL MEDICINE

## 2021-06-30 PROCEDURE — 25010000002 HEPARIN (PORCINE) PER 1000 UNITS: Performed by: INTERNAL MEDICINE

## 2021-06-30 PROCEDURE — 25010000002 PROPOFOL 10 MG/ML EMULSION: Performed by: NURSE ANESTHETIST, CERTIFIED REGISTERED

## 2021-06-30 PROCEDURE — 63710000001 INSULIN DETEMIR PER 5 UNITS: Performed by: INTERNAL MEDICINE

## 2021-06-30 RX ORDER — SODIUM CHLORIDE, SODIUM LACTATE, POTASSIUM CHLORIDE, CALCIUM CHLORIDE 600; 310; 30; 20 MG/100ML; MG/100ML; MG/100ML; MG/100ML
9 INJECTION, SOLUTION INTRAVENOUS CONTINUOUS
Status: DISCONTINUED | OUTPATIENT
Start: 2021-06-30 | End: 2021-07-01

## 2021-06-30 RX ORDER — LIDOCAINE HYDROCHLORIDE 10 MG/ML
INJECTION, SOLUTION EPIDURAL; INFILTRATION; INTRACAUDAL; PERINEURAL AS NEEDED
Status: DISCONTINUED | OUTPATIENT
Start: 2021-06-30 | End: 2021-06-30 | Stop reason: SURG

## 2021-06-30 RX ORDER — LIDOCAINE HYDROCHLORIDE 10 MG/ML
0.5 INJECTION, SOLUTION EPIDURAL; INFILTRATION; INTRACAUDAL; PERINEURAL ONCE AS NEEDED
Status: DISCONTINUED | OUTPATIENT
Start: 2021-06-30 | End: 2021-06-30 | Stop reason: HOSPADM

## 2021-06-30 RX ORDER — PROPOFOL 10 MG/ML
VIAL (ML) INTRAVENOUS AS NEEDED
Status: DISCONTINUED | OUTPATIENT
Start: 2021-06-30 | End: 2021-06-30 | Stop reason: SURG

## 2021-06-30 RX ORDER — SODIUM CHLORIDE 0.9 % (FLUSH) 0.9 %
10 SYRINGE (ML) INJECTION AS NEEDED
Status: DISCONTINUED | OUTPATIENT
Start: 2021-06-30 | End: 2021-06-30 | Stop reason: HOSPADM

## 2021-06-30 RX ORDER — PANTOPRAZOLE SODIUM 40 MG/1
40 TABLET, DELAYED RELEASE ORAL
Status: DISCONTINUED | OUTPATIENT
Start: 2021-07-01 | End: 2021-07-03 | Stop reason: HOSPADM

## 2021-06-30 RX ORDER — ONDANSETRON 2 MG/ML
4 INJECTION INTRAMUSCULAR; INTRAVENOUS ONCE AS NEEDED
Status: DISCONTINUED | OUTPATIENT
Start: 2021-06-30 | End: 2021-06-30 | Stop reason: HOSPADM

## 2021-06-30 RX ORDER — MIDAZOLAM HYDROCHLORIDE 1 MG/ML
1 INJECTION INTRAMUSCULAR; INTRAVENOUS
Status: DISCONTINUED | OUTPATIENT
Start: 2021-06-30 | End: 2021-06-30 | Stop reason: HOSPADM

## 2021-06-30 RX ORDER — SODIUM CHLORIDE 0.9 % (FLUSH) 0.9 %
10 SYRINGE (ML) INJECTION EVERY 12 HOURS SCHEDULED
Status: DISCONTINUED | OUTPATIENT
Start: 2021-06-30 | End: 2021-06-30 | Stop reason: HOSPADM

## 2021-06-30 RX ADMIN — AMLODIPINE BESYLATE 10 MG: 10 TABLET ORAL at 10:15

## 2021-06-30 RX ADMIN — HEPARIN SODIUM 5000 UNITS: 5000 INJECTION INTRAVENOUS; SUBCUTANEOUS at 10:17

## 2021-06-30 RX ADMIN — ATORVASTATIN CALCIUM 80 MG: 40 TABLET, FILM COATED ORAL at 20:08

## 2021-06-30 RX ADMIN — PROPOFOL 50 MG: 10 INJECTION, EMULSION INTRAVENOUS at 08:41

## 2021-06-30 RX ADMIN — METOCLOPRAMIDE 5 MG: 5 INJECTION, SOLUTION INTRAMUSCULAR; INTRAVENOUS at 12:16

## 2021-06-30 RX ADMIN — SODIUM CHLORIDE, PRESERVATIVE FREE 10 ML: 5 INJECTION INTRAVENOUS at 20:00

## 2021-06-30 RX ADMIN — ASPIRIN 81 MG: 81 TABLET, COATED ORAL at 10:16

## 2021-06-30 RX ADMIN — METOPROLOL TARTRATE 5 MG: 5 INJECTION INTRAVENOUS at 20:00

## 2021-06-30 RX ADMIN — HEPARIN SODIUM 5000 UNITS: 5000 INJECTION INTRAVENOUS; SUBCUTANEOUS at 20:07

## 2021-06-30 RX ADMIN — GABAPENTIN 400 MG: 400 CAPSULE ORAL at 20:08

## 2021-06-30 RX ADMIN — SODIUM CHLORIDE, POTASSIUM CHLORIDE, SODIUM LACTATE AND CALCIUM CHLORIDE: 600; 310; 30; 20 INJECTION, SOLUTION INTRAVENOUS at 08:36

## 2021-06-30 RX ADMIN — SODIUM CHLORIDE, PRESERVATIVE FREE 10 ML: 5 INJECTION INTRAVENOUS at 10:18

## 2021-06-30 RX ADMIN — LIDOCAINE HYDROCHLORIDE 50 MG: 10 INJECTION, SOLUTION EPIDURAL; INFILTRATION; INTRACAUDAL; PERINEURAL at 08:41

## 2021-06-30 RX ADMIN — INSULIN LISPRO 2 UNITS: 100 INJECTION, SOLUTION INTRAVENOUS; SUBCUTANEOUS at 20:17

## 2021-06-30 RX ADMIN — PROPOFOL 50 MG: 10 INJECTION, EMULSION INTRAVENOUS at 08:45

## 2021-06-30 RX ADMIN — PANTOPRAZOLE SODIUM 40 MG: 40 INJECTION, POWDER, FOR SOLUTION INTRAVENOUS at 10:15

## 2021-06-30 RX ADMIN — FLUOXETINE HYDROCHLORIDE 20 MG: 20 CAPSULE ORAL at 10:16

## 2021-06-30 RX ADMIN — GABAPENTIN 400 MG: 400 CAPSULE ORAL at 10:16

## 2021-06-30 RX ADMIN — INSULIN DETEMIR 12 UNITS: 100 INJECTION, SOLUTION SUBCUTANEOUS at 20:08

## 2021-06-30 RX ADMIN — TERAZOSIN HYDROCHLORIDE 1 MG: 1 CAPSULE ORAL at 20:08

## 2021-06-30 RX ADMIN — SODIUM CHLORIDE, POTASSIUM CHLORIDE, SODIUM LACTATE AND CALCIUM CHLORIDE 9 ML/HR: 600; 310; 30; 20 INJECTION, SOLUTION INTRAVENOUS at 07:51

## 2021-06-30 NOTE — ANESTHESIA PREPROCEDURE EVALUATION
Anesthesia Evaluation                  Airway   Mallampati: I  TM distance: >3 FB  Neck ROM: full  No difficulty expected  Dental      Pulmonary    Cardiovascular     ECG reviewed    (+) hypertension, valvular problems/murmurs, hyperlipidemia,       Neuro/Psych  (+) TIA, CVA, headaches, dizziness/light headedness, numbness, psychiatric history,     GI/Hepatic/Renal/Endo    (+)  GERD,  diabetes mellitus,     Musculoskeletal     (+) back pain,   Abdominal    Substance History      OB/GYN          Other                        Anesthesia Plan    ASA 3     general     intravenous induction     Anesthetic plan, all risks, benefits, and alternatives have been provided, discussed and informed consent has been obtained with: patient.    Plan discussed with CRNA.

## 2021-06-30 NOTE — ANESTHESIA POSTPROCEDURE EVALUATION
Patient: Thelma Michael    Procedure Summary     Date: 06/30/21 Room / Location:  [facility]    Anesthesia Start: 0837 Anesthesia Stop:     Procedure: ESOPHAGOGASTRODUODENOSCOPY (N/A ) Diagnosis:       Non-intractable vomiting with nausea, unspecified vomiting type      (Non-intractable vomiting with nausea, unspecified vomiting type [R11.2])    Surgeons: Brunner, Mark I, MD Provider: Pedro Zaragoza MD    Anesthesia Type: general ASA Status: 3          Anesthesia Type: general    Vitals  No vitals data found for the desired time range.          Post Anesthesia Care and Evaluation    Patient location during evaluation: PACU  Patient participation: complete - patient participated  Level of consciousness: awake and alert  Pain management: adequate  Airway patency: patent  Anesthetic complications: No anesthetic complications  PONV Status: none  Cardiovascular status: hemodynamically stable and acceptable  Respiratory status: nonlabored ventilation, acceptable and nasal cannula  Hydration status: acceptable

## 2021-07-01 ENCOUNTER — APPOINTMENT (OUTPATIENT)
Dept: NUCLEAR MEDICINE | Facility: HOSPITAL | Age: 61
End: 2021-07-01

## 2021-07-01 DIAGNOSIS — E11.42 DIABETIC PERIPHERAL NEUROPATHY (HCC): Primary | ICD-10-CM

## 2021-07-01 LAB
CYTO UR: NORMAL
GLUCOSE BLDC GLUCOMTR-MCNC: 105 MG/DL (ref 70–130)
GLUCOSE BLDC GLUCOMTR-MCNC: 109 MG/DL (ref 70–130)
GLUCOSE BLDC GLUCOMTR-MCNC: 75 MG/DL (ref 70–130)
GLUCOSE BLDC GLUCOMTR-MCNC: 78 MG/DL (ref 70–130)
LAB AP CASE REPORT: NORMAL
LAB AP CLINICAL INFORMATION: NORMAL
PATH REPORT.FINAL DX SPEC: NORMAL
PATH REPORT.GROSS SPEC: NORMAL

## 2021-07-01 PROCEDURE — 78264 GASTRIC EMPTYING IMG STUDY: CPT

## 2021-07-01 PROCEDURE — 0 TECHNETIUM SULFUR COLLOID: Performed by: INTERNAL MEDICINE

## 2021-07-01 PROCEDURE — 63710000001 INSULIN DETEMIR PER 5 UNITS: Performed by: INTERNAL MEDICINE

## 2021-07-01 PROCEDURE — 82962 GLUCOSE BLOOD TEST: CPT

## 2021-07-01 PROCEDURE — A9541 TC99M SULFUR COLLOID: HCPCS | Performed by: INTERNAL MEDICINE

## 2021-07-01 PROCEDURE — 99231 SBSQ HOSP IP/OBS SF/LOW 25: CPT | Performed by: PHYSICIAN ASSISTANT

## 2021-07-01 PROCEDURE — 99232 SBSQ HOSP IP/OBS MODERATE 35: CPT | Performed by: NURSE PRACTITIONER

## 2021-07-01 PROCEDURE — 25010000002 HEPARIN (PORCINE) PER 1000 UNITS: Performed by: INTERNAL MEDICINE

## 2021-07-01 PROCEDURE — 25010000002 ONDANSETRON PER 1 MG: Performed by: INTERNAL MEDICINE

## 2021-07-01 RX ORDER — HYDRALAZINE HYDROCHLORIDE 20 MG/ML
2.5 INJECTION INTRAMUSCULAR; INTRAVENOUS EVERY 6 HOURS PRN
Status: DISCONTINUED | OUTPATIENT
Start: 2021-07-01 | End: 2021-07-03 | Stop reason: HOSPADM

## 2021-07-01 RX ADMIN — ONDANSETRON 4 MG: 2 INJECTION INTRAMUSCULAR; INTRAVENOUS at 20:20

## 2021-07-01 RX ADMIN — ASPIRIN 81 MG: 81 TABLET, COATED ORAL at 08:31

## 2021-07-01 RX ADMIN — SODIUM CHLORIDE, PRESERVATIVE FREE 10 ML: 5 INJECTION INTRAVENOUS at 20:20

## 2021-07-01 RX ADMIN — ONDANSETRON 4 MG: 2 INJECTION INTRAMUSCULAR; INTRAVENOUS at 13:01

## 2021-07-01 RX ADMIN — MEGESTROL ACETATE 40 MG: 40 TABLET ORAL at 08:30

## 2021-07-01 RX ADMIN — ATORVASTATIN CALCIUM 80 MG: 40 TABLET, FILM COATED ORAL at 20:25

## 2021-07-01 RX ADMIN — HEPARIN SODIUM 5000 UNITS: 5000 INJECTION INTRAVENOUS; SUBCUTANEOUS at 20:27

## 2021-07-01 RX ADMIN — TECHNETIUM TC 99M SULFUR COLLOID 1 DOSE: KIT at 13:25

## 2021-07-01 RX ADMIN — PANTOPRAZOLE SODIUM 40 MG: 40 TABLET, DELAYED RELEASE ORAL at 08:31

## 2021-07-01 RX ADMIN — GABAPENTIN 400 MG: 400 CAPSULE ORAL at 20:25

## 2021-07-01 RX ADMIN — AMLODIPINE BESYLATE 10 MG: 10 TABLET ORAL at 08:31

## 2021-07-01 RX ADMIN — TERAZOSIN HYDROCHLORIDE 1 MG: 1 CAPSULE ORAL at 20:25

## 2021-07-01 RX ADMIN — FLUOXETINE HYDROCHLORIDE 20 MG: 20 CAPSULE ORAL at 08:31

## 2021-07-01 RX ADMIN — ACETAMINOPHEN 650 MG: 325 TABLET, FILM COATED ORAL at 16:39

## 2021-07-01 RX ADMIN — INSULIN DETEMIR 12 UNITS: 100 INJECTION, SOLUTION SUBCUTANEOUS at 20:25

## 2021-07-01 RX ADMIN — ACETAMINOPHEN 650 MG: 325 TABLET, FILM COATED ORAL at 22:27

## 2021-07-02 LAB
BACTERIA SPEC AEROBE CULT: NORMAL
BACTERIA SPEC AEROBE CULT: NORMAL
GLUCOSE BLDC GLUCOMTR-MCNC: 109 MG/DL (ref 70–130)
GLUCOSE BLDC GLUCOMTR-MCNC: 142 MG/DL (ref 70–130)
GLUCOSE BLDC GLUCOMTR-MCNC: 184 MG/DL (ref 70–130)
GLUCOSE BLDC GLUCOMTR-MCNC: 87 MG/DL (ref 70–130)

## 2021-07-02 PROCEDURE — 63710000001 INSULIN DETEMIR PER 5 UNITS: Performed by: INTERNAL MEDICINE

## 2021-07-02 PROCEDURE — 25010000002 HEPARIN (PORCINE) PER 1000 UNITS: Performed by: INTERNAL MEDICINE

## 2021-07-02 PROCEDURE — 63710000001 ONDANSETRON PER 8 MG: Performed by: INTERNAL MEDICINE

## 2021-07-02 PROCEDURE — 63710000001 INSULIN LISPRO (HUMAN) PER 5 UNITS: Performed by: INTERNAL MEDICINE

## 2021-07-02 PROCEDURE — 82962 GLUCOSE BLOOD TEST: CPT

## 2021-07-02 PROCEDURE — 99232 SBSQ HOSP IP/OBS MODERATE 35: CPT | Performed by: PHYSICIAN ASSISTANT

## 2021-07-02 PROCEDURE — 99232 SBSQ HOSP IP/OBS MODERATE 35: CPT | Performed by: NURSE PRACTITIONER

## 2021-07-02 RX ORDER — METOCLOPRAMIDE 5 MG/1
5 TABLET ORAL
Status: DISCONTINUED | OUTPATIENT
Start: 2021-07-02 | End: 2021-07-03 | Stop reason: HOSPADM

## 2021-07-02 RX ADMIN — METOCLOPRAMIDE 5 MG: 5 TABLET ORAL at 14:57

## 2021-07-02 RX ADMIN — METOCLOPRAMIDE 5 MG: 5 TABLET ORAL at 21:48

## 2021-07-02 RX ADMIN — GABAPENTIN 400 MG: 400 CAPSULE ORAL at 21:48

## 2021-07-02 RX ADMIN — TERAZOSIN HYDROCHLORIDE 1 MG: 1 CAPSULE ORAL at 21:48

## 2021-07-02 RX ADMIN — METOCLOPRAMIDE 5 MG: 5 TABLET ORAL at 18:26

## 2021-07-02 RX ADMIN — ONDANSETRON HYDROCHLORIDE 4 MG: 4 TABLET, FILM COATED ORAL at 13:05

## 2021-07-02 RX ADMIN — INSULIN DETEMIR 12 UNITS: 100 INJECTION, SOLUTION SUBCUTANEOUS at 21:50

## 2021-07-02 RX ADMIN — HEPARIN SODIUM 5000 UNITS: 5000 INJECTION INTRAVENOUS; SUBCUTANEOUS at 21:49

## 2021-07-02 RX ADMIN — ATORVASTATIN CALCIUM 80 MG: 40 TABLET, FILM COATED ORAL at 21:48

## 2021-07-02 RX ADMIN — AMLODIPINE BESYLATE 10 MG: 10 TABLET ORAL at 17:03

## 2021-07-02 RX ADMIN — TRAZODONE HYDROCHLORIDE 50 MG: 50 TABLET ORAL at 00:15

## 2021-07-02 RX ADMIN — SODIUM CHLORIDE, PRESERVATIVE FREE 10 ML: 5 INJECTION INTRAVENOUS at 21:50

## 2021-07-02 RX ADMIN — INSULIN LISPRO 2 UNITS: 100 INJECTION, SOLUTION INTRAVENOUS; SUBCUTANEOUS at 21:50

## 2021-07-03 ENCOUNTER — READMISSION MANAGEMENT (OUTPATIENT)
Dept: CALL CENTER | Facility: HOSPITAL | Age: 61
End: 2021-07-03

## 2021-07-03 VITALS
HEART RATE: 79 BPM | OXYGEN SATURATION: 100 % | DIASTOLIC BLOOD PRESSURE: 58 MMHG | RESPIRATION RATE: 18 BRPM | HEIGHT: 68 IN | SYSTOLIC BLOOD PRESSURE: 95 MMHG | BODY MASS INDEX: 19.22 KG/M2 | WEIGHT: 126.8 LBS | TEMPERATURE: 96.5 F

## 2021-07-03 LAB
GLUCOSE BLDC GLUCOMTR-MCNC: 155 MG/DL (ref 70–130)
GLUCOSE BLDC GLUCOMTR-MCNC: 183 MG/DL (ref 70–130)
GLUCOSE BLDC GLUCOMTR-MCNC: 193 MG/DL (ref 70–130)
GLUCOSE BLDC GLUCOMTR-MCNC: 64 MG/DL (ref 70–130)

## 2021-07-03 PROCEDURE — 63710000001 INSULIN LISPRO (HUMAN) PER 5 UNITS: Performed by: INTERNAL MEDICINE

## 2021-07-03 PROCEDURE — 25010000002 HEPARIN (PORCINE) PER 1000 UNITS: Performed by: INTERNAL MEDICINE

## 2021-07-03 PROCEDURE — 99239 HOSP IP/OBS DSCHRG MGMT >30: CPT | Performed by: NURSE PRACTITIONER

## 2021-07-03 PROCEDURE — 82962 GLUCOSE BLOOD TEST: CPT

## 2021-07-03 RX ORDER — ONDANSETRON 4 MG/1
4 TABLET, FILM COATED ORAL EVERY 6 HOURS PRN
Qty: 15 TABLET | Refills: 0 | Status: ON HOLD | OUTPATIENT
Start: 2021-07-03 | End: 2022-03-29 | Stop reason: SDUPTHER

## 2021-07-03 RX ORDER — METOCLOPRAMIDE 5 MG/1
5 TABLET ORAL
Qty: 56 TABLET | Refills: 0 | Status: SHIPPED | OUTPATIENT
Start: 2021-07-03 | End: 2021-07-17

## 2021-07-03 RX ADMIN — HEPARIN SODIUM 5000 UNITS: 5000 INJECTION INTRAVENOUS; SUBCUTANEOUS at 08:41

## 2021-07-03 RX ADMIN — INSULIN LISPRO 3 UNITS: 100 INJECTION, SOLUTION INTRAVENOUS; SUBCUTANEOUS at 08:41

## 2021-07-03 RX ADMIN — MEGESTROL ACETATE 40 MG: 40 TABLET ORAL at 08:41

## 2021-07-03 RX ADMIN — SODIUM CHLORIDE, PRESERVATIVE FREE 10 ML: 5 INJECTION INTRAVENOUS at 08:42

## 2021-07-03 RX ADMIN — AMLODIPINE BESYLATE 10 MG: 10 TABLET ORAL at 08:40

## 2021-07-03 RX ADMIN — METOCLOPRAMIDE 5 MG: 5 TABLET ORAL at 12:13

## 2021-07-03 RX ADMIN — INSULIN LISPRO 2 UNITS: 100 INJECTION, SOLUTION INTRAVENOUS; SUBCUTANEOUS at 08:41

## 2021-07-03 RX ADMIN — PANTOPRAZOLE SODIUM 40 MG: 40 TABLET, DELAYED RELEASE ORAL at 08:40

## 2021-07-03 RX ADMIN — ACETAMINOPHEN 650 MG: 325 TABLET, FILM COATED ORAL at 05:57

## 2021-07-03 RX ADMIN — METOCLOPRAMIDE 5 MG: 5 TABLET ORAL at 08:41

## 2021-07-03 RX ADMIN — FLUOXETINE HYDROCHLORIDE 20 MG: 20 CAPSULE ORAL at 08:41

## 2021-07-03 RX ADMIN — ASPIRIN 81 MG: 81 TABLET, COATED ORAL at 08:40

## 2021-07-03 RX ADMIN — GABAPENTIN 400 MG: 400 CAPSULE ORAL at 08:40

## 2021-07-03 NOTE — OUTREACH NOTE
Prep Survey      Responses   [facility] patient discharged from?  [place]   Is LACE score < 7 ?  No   Emergency Room discharge w/ pulse ox?  No   Eligibility  [facility]   Date of Admission  06/27/21   Date of Discharge  07/03/21   Discharge Disposition  Home or Self Care   Discharge diagnosis  Diabetic ketoacidosis, htn   Does the patient have one of the following disease processes/diagnoses(primary or secondary)?  Other   Does the patient have Home health ordered?  No   Is there a DME ordered?  No   Is this a private patient?  Yes   Prep survey completed?  Yes          Feli Kiser, RN

## 2021-07-05 ENCOUNTER — TRANSITIONAL CARE MANAGEMENT TELEPHONE ENCOUNTER (OUTPATIENT)
Dept: CALL CENTER | Facility: HOSPITAL | Age: 61
End: 2021-07-05

## 2021-07-05 NOTE — OUTREACH NOTE
Call Center TCM Note      Responses   [facility] patient discharged from?  [place]   Does the patient have one of the following disease processes/diagnoses(primary or secondary)?  Other   TCM attempt successful?  No   Unsuccessful attempts  Attempt 2          Belgica Cintron LPN    7/5/2021, 16:26 EDT

## 2021-07-05 NOTE — OUTREACH NOTE
Call Center TCM Note      Responses   [facility] patient discharged from?  [place]   Does the patient have one of the following disease processes/diagnoses(primary or secondary)?  Other   TCM attempt successful?  No   Unsuccessful attempts  Attempt 1          Irma Lamas RN    7/5/2021, 10:45 EDT

## 2021-07-06 ENCOUNTER — TRANSITIONAL CARE MANAGEMENT TELEPHONE ENCOUNTER (OUTPATIENT)
Dept: CALL CENTER | Facility: HOSPITAL | Age: 61
End: 2021-07-06

## 2021-07-06 RX ORDER — TRAZODONE HYDROCHLORIDE 50 MG/1
TABLET ORAL
Qty: 45 TABLET | Refills: 5 | Status: SHIPPED | OUTPATIENT
Start: 2021-07-06 | End: 2022-01-28

## 2021-07-06 NOTE — OUTREACH NOTE
Call Center TCM Note      Responses   [facility] patient discharged from?  [place]   Does the patient have one of the following disease processes/diagnoses(primary or secondary)?  Other   TCM attempt successful?  No   Unsuccessful attempts  Attempt 3   Does the patient have a primary care provider?   Yes   Does the patient have an appointment with their PCP within 7 days of discharge?  Greater than 7 days   Comments regarding PCP  [facility] appt on 7/14/21 at 8:30 AM   What is preventing the patient from scheduling follow up appointments within 7 days of discharge?  Earlier appointment not available   Nursing Interventions  Verified appointment date/time/provider   Has the patient kept scheduled appointments due by today?  N/A          Autumn Hebert RN    7/6/2021, 15:03 EDT

## 2021-07-07 ENCOUNTER — TELEPHONE (OUTPATIENT)
Dept: ENDOCRINOLOGY | Facility: CLINIC | Age: 61
End: 2021-07-07

## 2021-07-11 ENCOUNTER — HOSPITAL ENCOUNTER (EMERGENCY)
Facility: HOSPITAL | Age: 61
Discharge: HOME | End: 2021-07-11
Attending: EMERGENCY MEDICINE | Admitting: EMERGENCY MEDICINE

## 2021-07-11 ENCOUNTER — APPOINTMENT (OUTPATIENT)
Dept: GENERAL RADIOLOGY | Facility: HOSPITAL | Age: 61
End: 2021-07-11

## 2021-07-11 VITALS
DIASTOLIC BLOOD PRESSURE: 70 MMHG | RESPIRATION RATE: 18 BRPM | BODY MASS INDEX: 17.88 KG/M2 | HEIGHT: 68 IN | OXYGEN SATURATION: 100 % | SYSTOLIC BLOOD PRESSURE: 141 MMHG | HEART RATE: 85 BPM | WEIGHT: 118 LBS

## 2021-07-11 DIAGNOSIS — E16.2 HYPOGLYCEMIA: Primary | ICD-10-CM

## 2021-07-11 DIAGNOSIS — Z86.39 HISTORY OF DIABETES MELLITUS: ICD-10-CM

## 2021-07-11 LAB
ALBUMIN SERPL-MCNC: 3.5 G/DL (ref 3.5–5.2)
ALBUMIN/GLOB SERPL: 1.3 G/DL
ALP SERPL-CCNC: 79 U/L (ref 39–117)
ALT SERPL W P-5'-P-CCNC: 20 U/L (ref 1–33)
ANION GAP SERPL CALCULATED.3IONS-SCNC: 13 MMOL/L (ref 5–15)
AST SERPL-CCNC: 16 U/L (ref 1–32)
BACTERIA UR QL AUTO: ABNORMAL /HPF
BASOPHILS # BLD AUTO: 0.04 10*3/MM3 (ref 0–0.2)
BASOPHILS NFR BLD AUTO: 0.4 % (ref 0–1.5)
BILIRUB SERPL-MCNC: <0.2 MG/DL (ref 0–1.2)
BILIRUB UR QL STRIP: NEGATIVE
BUN SERPL-MCNC: 16 MG/DL (ref 8–23)
BUN/CREAT SERPL: 19.8 (ref 7–25)
CALCIUM SPEC-SCNC: 9.1 MG/DL (ref 8.6–10.5)
CHLORIDE SERPL-SCNC: 106 MMOL/L (ref 98–107)
CLARITY UR: CLEAR
CO2 SERPL-SCNC: 24 MMOL/L (ref 22–29)
COLOR UR: YELLOW
CREAT SERPL-MCNC: 0.81 MG/DL (ref 0.57–1)
DEPRECATED RDW RBC AUTO: 44.9 FL (ref 37–54)
EOSINOPHIL # BLD AUTO: 0.11 10*3/MM3 (ref 0–0.4)
EOSINOPHIL NFR BLD AUTO: 1.1 % (ref 0.3–6.2)
ERYTHROCYTE [DISTWIDTH] IN BLOOD BY AUTOMATED COUNT: 13.1 % (ref 12.3–15.4)
GFR SERPL CREATININE-BSD FRML MDRD: 87 ML/MIN/1.73
GLOBULIN UR ELPH-MCNC: 2.8 GM/DL
GLUCOSE BLDC GLUCOMTR-MCNC: 153 MG/DL (ref 70–130)
GLUCOSE BLDC GLUCOMTR-MCNC: 80 MG/DL (ref 70–130)
GLUCOSE SERPL-MCNC: 84 MG/DL (ref 65–99)
GLUCOSE UR STRIP-MCNC: ABNORMAL MG/DL
HCT VFR BLD AUTO: 32.9 % (ref 34–46.6)
HGB BLD-MCNC: 10.6 G/DL (ref 12–15.9)
HGB UR QL STRIP.AUTO: NEGATIVE
HOLD SPECIMEN: NORMAL
HYALINE CASTS UR QL AUTO: ABNORMAL /LPF
IMM GRANULOCYTES # BLD AUTO: 0.19 10*3/MM3 (ref 0–0.05)
IMM GRANULOCYTES NFR BLD AUTO: 1.9 % (ref 0–0.5)
KETONES UR QL STRIP: NEGATIVE
LEUKOCYTE ESTERASE UR QL STRIP.AUTO: ABNORMAL
LYMPHOCYTES # BLD AUTO: 2.43 10*3/MM3 (ref 0.7–3.1)
LYMPHOCYTES NFR BLD AUTO: 24.1 % (ref 19.6–45.3)
MAGNESIUM SERPL-MCNC: 1.9 MG/DL (ref 1.6–2.4)
MCH RBC QN AUTO: 29.9 PG (ref 26.6–33)
MCHC RBC AUTO-ENTMCNC: 32.2 G/DL (ref 31.5–35.7)
MCV RBC AUTO: 92.9 FL (ref 79–97)
MONOCYTES # BLD AUTO: 0.65 10*3/MM3 (ref 0.1–0.9)
MONOCYTES NFR BLD AUTO: 6.4 % (ref 5–12)
NEUTROPHILS NFR BLD AUTO: 6.66 10*3/MM3 (ref 1.7–7)
NEUTROPHILS NFR BLD AUTO: 66.1 % (ref 42.7–76)
NITRITE UR QL STRIP: NEGATIVE
NRBC BLD AUTO-RTO: 0 /100 WBC (ref 0–0.2)
PH UR STRIP.AUTO: 8 [PH] (ref 5–8)
PLATELET # BLD AUTO: 275 10*3/MM3 (ref 140–450)
PMV BLD AUTO: 9.9 FL (ref 6–12)
POTASSIUM SERPL-SCNC: 3.8 MMOL/L (ref 3.5–5.2)
PROT SERPL-MCNC: 6.3 G/DL (ref 6–8.5)
PROT UR QL STRIP: NEGATIVE
QT INTERVAL: 362 MS
QTC INTERVAL: 447 MS
RBC # BLD AUTO: 3.54 10*6/MM3 (ref 3.77–5.28)
RBC # UR: ABNORMAL /HPF
REF LAB TEST METHOD: ABNORMAL
SODIUM SERPL-SCNC: 143 MMOL/L (ref 136–145)
SP GR UR STRIP: 1.01 (ref 1–1.03)
SQUAMOUS #/AREA URNS HPF: ABNORMAL /HPF
TROPONIN T SERPL-MCNC: <0.01 NG/ML (ref 0–0.03)
UROBILINOGEN UR QL STRIP: ABNORMAL
WBC # BLD AUTO: 10.08 10*3/MM3 (ref 3.4–10.8)
WBC UR QL AUTO: ABNORMAL /HPF
WHOLE BLOOD HOLD SPECIMEN: NORMAL

## 2021-07-11 PROCEDURE — 80053 COMPREHEN METABOLIC PANEL: CPT | Performed by: EMERGENCY MEDICINE

## 2021-07-11 PROCEDURE — 71045 X-RAY EXAM CHEST 1 VIEW: CPT

## 2021-07-11 PROCEDURE — 83735 ASSAY OF MAGNESIUM: CPT | Performed by: EMERGENCY MEDICINE

## 2021-07-11 PROCEDURE — 84484 ASSAY OF TROPONIN QUANT: CPT | Performed by: EMERGENCY MEDICINE

## 2021-07-11 PROCEDURE — 93005 ELECTROCARDIOGRAM TRACING: CPT | Performed by: EMERGENCY MEDICINE

## 2021-07-11 PROCEDURE — 85025 COMPLETE CBC W/AUTO DIFF WBC: CPT | Performed by: EMERGENCY MEDICINE

## 2021-07-11 PROCEDURE — 81001 URINALYSIS AUTO W/SCOPE: CPT | Performed by: EMERGENCY MEDICINE

## 2021-07-11 PROCEDURE — 99284 EMERGENCY DEPT VISIT MOD MDM: CPT

## 2021-07-11 PROCEDURE — 82962 GLUCOSE BLOOD TEST: CPT

## 2021-07-11 RX ORDER — SODIUM CHLORIDE 0.9 % (FLUSH) 0.9 %
10 SYRINGE (ML) INJECTION AS NEEDED
Status: DISCONTINUED | OUTPATIENT
Start: 2021-07-11 | End: 2021-07-11 | Stop reason: HOSPADM

## 2021-07-11 NOTE — ED PROVIDER NOTES
"Subjective   60-year-old female presents for evaluation via EMS for hypoglycemia.  Of note, the patient has a history of type 1 diabetes.  She takes insulin but is not on any oral diabetes medications.  She states that she did not eat well today.  She believes that she took her insulin as directed.  She had an episode of decreased LOC for which her family called EMS.  On their arrival, the patient's glucose was in the 40s.  She was given D50 and her mental status improved significantly.  She was subsequent brought to the emergency department to be evaluated.  Currently, the patient states that she feels much better and her only complaint at this time is feeling \"a little bit weak.\"          Review of Systems   Neurological: Positive for weakness.   All other systems reviewed and are negative.      Past Medical History:   Diagnosis Date   • Acid reflux    • Acute bronchitis    • Cardiac murmur    • Diabetes mellitus (CMS/HCC)    • H/O echocardiogram 08/07/2012    i. LVEF 65%.ii. Mild LVH.iii. Borderline evidence of atrial septal aneurysm.  No PFO.    • History of nuclear stress test 08/22/2014    Negative for ischemia and scars; LVEF 77%.     • Hyperlipidemia    • Hypertension    • Impacted cerumen of both ears    • Migraine    • Self-catheterizes urinary bladder    • Sinusitis    • Stroke (CMS/HCC)    • Tobacco abuse     quit 4 days ago.     • Urticaria        No Known Allergies    Past Surgical History:   Procedure Laterality Date   • COLONOSCOPY     • DENTAL PROCEDURE     • ENDOSCOPY N/A 6/30/2021    Procedure: ESOPHAGOGASTRODUODENOSCOPY;  Surgeon: Brunner, Mark I, MD;  Location: [facility];  Service: Gastroenterology;  Laterality: N/A;   • NO PAST SURGERIES         Family History   Problem Relation Age of Onset   • Anxiety disorder Other    • Arthritis Other    • ADD / ADHD Other    • Heart disease Other         cardiac disorder   • Depression Other    • Diabetes Other    • Hyperlipidemia Other    • " Hypertension Other    • Lung cancer Other    • Osteoporosis Other    • Hypertension Brother    • Diabetes Brother    • Hyperlipidemia Mother    • Hypertension Mother    • Colon cancer Neg Hx        Social History     Socioeconomic History   • Marital status:      Spouse name: Not on file   • Number of children: Not on file   • Years of education: Not on file   • Highest education level: Not on file   Tobacco Use   • Smoking status: Former Smoker     Quit date: 2019     Years since quittin.1   • Smokeless tobacco: Never Used   • Tobacco comment: BC PL never smoker    Vaping Use   • Vaping Use: Never used   Substance and Sexual Activity   • Alcohol use: Yes     Alcohol/week: 1.0 standard drinks     Types: 1 Glasses of wine per week     Comment: ocassional   • Drug use: No   • Sexual activity: Defer     Comment: Single            Objective   Physical Exam  Vitals and nursing note reviewed.   Constitutional:       General: She is not in acute distress.     Appearance: Normal appearance. She is well-developed. She is not diaphoretic.      Comments: Nontoxic-appearing female   HENT:      Head: Normocephalic and atraumatic.   Eyes:      Pupils: Pupils are equal, round, and reactive to light.   Cardiovascular:      Rate and Rhythm: Normal rate and regular rhythm.      Pulses: Normal pulses.      Heart sounds: Normal heart sounds. No murmur heard.   No friction rub. No gallop.    Pulmonary:      Effort: Pulmonary effort is normal. No respiratory distress.      Breath sounds: Normal breath sounds. No wheezing or rales.   Abdominal:      General: Bowel sounds are normal. There is no distension.      Palpations: Abdomen is soft. There is no mass.      Tenderness: There is no abdominal tenderness. There is no guarding or rebound.   Musculoskeletal:         General: Normal range of motion.      Cervical back: Neck supple.   Skin:     General: Skin is warm and dry.      Findings: No erythema or rash.    Neurological:      General: No focal deficit present.      Mental Status: She is alert and oriented to person, place, and time.   Psychiatric:         Mood and Affect: Mood normal.         Thought Content: Thought content normal.         Judgment: Judgment normal.         Procedures           ED Course  ED Course as of Jul 11 2137   Sun Jul 11, 2021 1913 60-year-old female presents via EMS to be evaluated for hypoglycemia.  Of note, the patient is a type I diabetic.  She states that she did not eat well today.  She had an episode of altered mental status for which her family called EMS.  She was noted to be hypoglycemic with a glucose in the 40s.  She was given D50 and brought to our facility to be evaluated.  Currently, the patient feels markedly improved.  She is asymptomatic at this time.  She is not on any oral diabetes medications.  We will obtain labs, and we will continue to closely watch the patient here in the emergency department.  Box lunch given.    [DD]   1953 Labs are unrevealing.  Chest x-ray is negative.  Patient resting comfortably and tolerating p.o.    [DD]   2037 Glucose is up trending.  Patient is tolerating p.o.  I feel that the patient can be discharged at this point.  I advised her to follow-up with her primary care physician within the next week.  Agreeable with plan and given appropriate strict return precautions.    [DD]      ED Course User Index  [DD] Pedro Rodríguez MD                                   Recent Results (from the past 24 hour(s))   Comprehensive Metabolic Panel    Collection Time: 07/11/21  7:08 PM    Specimen: Blood   Result Value Ref Range    Glucose 84 65 - 99 mg/dL    BUN 16 8 - 23 mg/dL    Creatinine 0.81 0.57 - 1.00 mg/dL    Sodium 143 136 - 145 mmol/L    Potassium 3.8 3.5 - 5.2 mmol/L    Chloride 106 98 - 107 mmol/L    CO2 24.0 22.0 - 29.0 mmol/L    Calcium 9.1 8.6 - 10.5 mg/dL    Total Protein 6.3 6.0 - 8.5 g/dL    Albumin 3.50 3.50 - 5.20 g/dL    ALT  (SGPT) 20 1 - 33 U/L    AST (SGOT) 16 1 - 32 U/L    Alkaline Phosphatase 79 39 - 117 U/L    Total Bilirubin <0.2 0.0 - 1.2 mg/dL    eGFR  African Amer 87 >60 mL/min/1.73    Globulin 2.8 gm/dL    A/G Ratio 1.3 g/dL    BUN/Creatinine Ratio 19.8 7.0 - 25.0    Anion Gap 13.0 5.0 - 15.0 mmol/L   Troponin    Collection Time: 07/11/21  7:08 PM    Specimen: Blood   Result Value Ref Range    Troponin T <0.010 0.000 - 0.030 ng/mL   Magnesium    Collection Time: 07/11/21  7:08 PM    Specimen: Blood   Result Value Ref Range    Magnesium 1.9 1.6 - 2.4 mg/dL   Green Top (Gel)    Collection Time: 07/11/21  7:08 PM   Result Value Ref Range    Extra Tube Hold for add-ons.    Lavender Top    Collection Time: 07/11/21  7:08 PM   Result Value Ref Range    Extra Tube hold for add-on    CBC Auto Differential    Collection Time: 07/11/21  7:08 PM    Specimen: Blood   Result Value Ref Range    WBC 10.08 3.40 - 10.80 10*3/mm3    RBC 3.54 (L) 3.77 - 5.28 10*6/mm3    Hemoglobin 10.6 (L) 12.0 - 15.9 g/dL    Hematocrit 32.9 (L) 34.0 - 46.6 %    MCV 92.9 79.0 - 97.0 fL    MCH 29.9 26.6 - 33.0 pg    MCHC 32.2 31.5 - 35.7 g/dL    RDW 13.1 12.3 - 15.4 %    RDW-SD 44.9 37.0 - 54.0 fl    MPV 9.9 6.0 - 12.0 fL    Platelets 275 140 - 450 10*3/mm3    Neutrophil % 66.1 42.7 - 76.0 %    Lymphocyte % 24.1 19.6 - 45.3 %    Monocyte % 6.4 5.0 - 12.0 %    Eosinophil % 1.1 0.3 - 6.2 %    Basophil % 0.4 0.0 - 1.5 %    Immature Grans % 1.9 (H) 0.0 - 0.5 %    Neutrophils, Absolute 6.66 1.70 - 7.00 10*3/mm3    Lymphocytes, Absolute 2.43 0.70 - 3.10 10*3/mm3    Monocytes, Absolute 0.65 0.10 - 0.90 10*3/mm3    Eosinophils, Absolute 0.11 0.00 - 0.40 10*3/mm3    Basophils, Absolute 0.04 0.00 - 0.20 10*3/mm3    Immature Grans, Absolute 0.19 (H) 0.00 - 0.05 10*3/mm3    nRBC 0.0 0.0 - 0.2 /100 WBC   POC Glucose Once    Collection Time: 07/11/21  7:14 PM    Specimen: Blood   Result Value Ref Range    Glucose 80 70 - 130 mg/dL   Urinalysis With Microscopic If Indicated (No  Culture) - Urine, Clean Catch    Collection Time: 07/11/21  7:25 PM    Specimen: Urine, Clean Catch   Result Value Ref Range    Color, UA Yellow Yellow, Straw    Appearance, UA Clear Clear    pH, UA 8.0 5.0 - 8.0    Specific Gravity, UA 1.009 1.001 - 1.030    Glucose,  mg/dL (Trace) (A) Negative    Ketones, UA Negative Negative    Bilirubin, UA Negative Negative    Blood, UA Negative Negative    Protein, UA Negative Negative    Leuk Esterase, UA Small (1+) (A) Negative    Nitrite, UA Negative Negative    Urobilinogen, UA 0.2 E.U./dL 0.2 - 1.0 E.U./dL   Urinalysis, Microscopic Only - Urine, Clean Catch    Collection Time: 07/11/21  7:25 PM    Specimen: Urine, Clean Catch   Result Value Ref Range    RBC, UA 0-2 None Seen, 0-2 /HPF    WBC, UA 21-30 (A) None Seen, 0-2 /HPF    Bacteria, UA None Seen None Seen, Trace /HPF    Squamous Epithelial Cells, UA 0-2 None Seen, 0-2 /HPF    Hyaline Casts, UA 0-6 0 - 6 /LPF    Methodology Automated Microscopy    POC Glucose Once    Collection Time: 07/11/21  8:36 PM    Specimen: Blood   Result Value Ref Range    Glucose 153 (H) 70 - 130 mg/dL     Note: In addition to lab results from this visit, the labs listed above may include labs taken at another facility or during a different encounter within the last 24 hours. Please correlate lab times with ED admission and discharge times for further clarification of the services performed during this visit.    XR Chest 1 View   Final Result   No acute cardiopulmonary findings.      Signer Name: Samm Gotti MD    Signed: 7/11/2021 7:49 PM    Workstation Name: [id number]     [facility]        Vitals:    07/11/21 1930 07/11/21 1932 07/11/21 2000 07/11/21 2030   BP: 121/72  139/68 141/70   Patient Position:       Pulse: 92 90 85 85   Resp:       SpO2: 100% 100% 100% 100%   Weight:       Height:         Medications   sodium chloride 0.9 % flush 10 mL (has no administration in time range)     ECG/EMG Results  (last 24 hours)     Procedure Component Value Units Date/Time    ECG 12 Lead [[id number]] Collected: 07/11/21 1848     Updated: 07/11/21 1849        ECG 12 Lead                     MDM    Final diagnoses:   Hypoglycemia   History of diabetes mellitus       ED Disposition  ED Disposition     ED Disposition Condition Comment    Discharge Stable           Monet Howe, APRN  [address] [address]  [phone]               Medication List      No changes were made to your prescriptions during this visit.          Pedro Rodríguez MD  07/11/21 4314

## 2021-07-12 ENCOUNTER — READMISSION MANAGEMENT (OUTPATIENT)
Dept: CALL CENTER | Facility: HOSPITAL | Age: 61
End: 2021-07-12

## 2021-07-12 RX ORDER — ERGOCALCIFEROL 1.25 MG/1
CAPSULE ORAL
Qty: 4 CAPSULE | Refills: 0 | Status: SHIPPED | OUTPATIENT
Start: 2021-07-12 | End: 2022-08-02

## 2021-07-12 RX ORDER — DOXAZOSIN MESYLATE 1 MG/1
TABLET ORAL
Qty: 90 TABLET | Refills: 0 | Status: SHIPPED | OUTPATIENT
Start: 2021-07-12 | End: 2022-01-28 | Stop reason: SDUPTHER

## 2021-07-12 RX ORDER — ATORVASTATIN CALCIUM 80 MG/1
TABLET, FILM COATED ORAL
Qty: 90 TABLET | Refills: 0 | Status: SHIPPED | OUTPATIENT
Start: 2021-07-12 | End: 2022-01-28 | Stop reason: SDUPTHER

## 2021-07-12 NOTE — OUTREACH NOTE
Medical Week 1 Survey      Responses   [facility] patient discharged from?  [place]   Does the patient have one of the following disease processes/diagnoses(primary or secondary)?  Other          Fani Saldana RN

## 2021-07-14 ENCOUNTER — TELEPHONE (OUTPATIENT)
Dept: ENDOCRINOLOGY | Facility: CLINIC | Age: 61
End: 2021-07-14

## 2021-07-14 ENCOUNTER — OFFICE VISIT (OUTPATIENT)
Dept: INTERNAL MEDICINE | Facility: CLINIC | Age: 61
End: 2021-07-14

## 2021-07-14 VITALS
RESPIRATION RATE: 18 BRPM | HEART RATE: 88 BPM | DIASTOLIC BLOOD PRESSURE: 62 MMHG | HEIGHT: 68 IN | BODY MASS INDEX: 20.31 KG/M2 | TEMPERATURE: 96.4 F | WEIGHT: 134 LBS | SYSTOLIC BLOOD PRESSURE: 124 MMHG | OXYGEN SATURATION: 98 %

## 2021-07-14 DIAGNOSIS — K31.84 GASTROPARESIS: ICD-10-CM

## 2021-07-14 DIAGNOSIS — R33.9 URINARY RETENTION: ICD-10-CM

## 2021-07-14 DIAGNOSIS — I10 ESSENTIAL HYPERTENSION: ICD-10-CM

## 2021-07-14 DIAGNOSIS — R41.3 MEMORY LOSS: ICD-10-CM

## 2021-07-14 DIAGNOSIS — G89.29 CHRONIC PAIN OF BOTH ANKLES: ICD-10-CM

## 2021-07-14 DIAGNOSIS — E10.65 UNCONTROLLED TYPE 1 DIABETES MELLITUS WITH HYPERGLYCEMIA (HCC): Primary | ICD-10-CM

## 2021-07-14 DIAGNOSIS — Z86.73 HISTORY OF TIAS: ICD-10-CM

## 2021-07-14 DIAGNOSIS — M25.571 CHRONIC PAIN OF BOTH ANKLES: ICD-10-CM

## 2021-07-14 DIAGNOSIS — M25.572 CHRONIC PAIN OF BOTH ANKLES: ICD-10-CM

## 2021-07-14 DIAGNOSIS — R41.0 CONFUSION AND DISORIENTATION: ICD-10-CM

## 2021-07-14 DIAGNOSIS — N39.0 RECURRENT UTI: ICD-10-CM

## 2021-07-14 PROCEDURE — 99214 OFFICE O/P EST MOD 30 MIN: CPT | Performed by: NURSE PRACTITIONER

## 2021-07-14 RX ORDER — DONEPEZIL HYDROCHLORIDE 5 MG/1
5 TABLET, FILM COATED ORAL NIGHTLY
Qty: 30 TABLET | Refills: 1 | Status: SHIPPED | OUTPATIENT
Start: 2021-07-14

## 2021-07-14 NOTE — TELEPHONE ENCOUNTER
PATIENT WOULD LIKE A CALL BACK TO DISCUSS DEXCOM SENSORS. SHE IS NEEDING TO BE ADVISED ON HOW TO USE IT OR GET IT STARTED. PHONE NUMBER [phone]

## 2021-07-15 ENCOUNTER — PREP FOR SURGERY (OUTPATIENT)
Dept: OTHER | Facility: HOSPITAL | Age: 61
End: 2021-07-15

## 2021-07-15 ENCOUNTER — READMISSION MANAGEMENT (OUTPATIENT)
Dept: CALL CENTER | Facility: HOSPITAL | Age: 61
End: 2021-07-15

## 2021-07-15 ENCOUNTER — OFFICE VISIT (OUTPATIENT)
Dept: GASTROENTEROLOGY | Facility: CLINIC | Age: 61
End: 2021-07-15

## 2021-07-15 ENCOUNTER — HOSPITAL ENCOUNTER (OUTPATIENT)
Facility: HOSPITAL | Age: 61
Setting detail: HOSPITAL OUTPATIENT SURGERY
End: 2021-07-15
Attending: INTERNAL MEDICINE | Admitting: INTERNAL MEDICINE

## 2021-07-15 VITALS
HEART RATE: 97 BPM | TEMPERATURE: 97.9 F | WEIGHT: 134.4 LBS | HEIGHT: 68 IN | SYSTOLIC BLOOD PRESSURE: 138 MMHG | OXYGEN SATURATION: 99 % | BODY MASS INDEX: 20.37 KG/M2 | DIASTOLIC BLOOD PRESSURE: 72 MMHG

## 2021-07-15 DIAGNOSIS — E11.43 GASTROPARESIS DUE TO DM (HCC): Primary | ICD-10-CM

## 2021-07-15 DIAGNOSIS — K58.2 IRRITABLE BOWEL SYNDROME WITH BOTH CONSTIPATION AND DIARRHEA: ICD-10-CM

## 2021-07-15 DIAGNOSIS — K29.30 CHRONIC SUPERFICIAL GASTRITIS WITHOUT BLEEDING: ICD-10-CM

## 2021-07-15 DIAGNOSIS — D64.9 ANEMIA, UNSPECIFIED TYPE: ICD-10-CM

## 2021-07-15 DIAGNOSIS — K31.84 GASTROPARESIS DUE TO DM (HCC): Primary | ICD-10-CM

## 2021-07-15 DIAGNOSIS — R74.8 ELEVATED LIVER ENZYMES: ICD-10-CM

## 2021-07-15 DIAGNOSIS — R11.2 NAUSEA AND VOMITING, INTRACTABILITY OF VOMITING NOT SPECIFIED, UNSPECIFIED VOMITING TYPE: ICD-10-CM

## 2021-07-15 PROBLEM — Z86.73 HISTORY OF TIAS: Status: ACTIVE | Noted: 2021-07-15

## 2021-07-15 PROBLEM — A41.9 SEPSIS SECONDARY TO UTI: Status: RESOLVED | Noted: 2020-05-25 | Resolved: 2021-07-15

## 2021-07-15 PROBLEM — N39.0 SEPSIS SECONDARY TO UTI: Status: RESOLVED | Noted: 2020-05-25 | Resolved: 2021-07-15

## 2021-07-15 PROCEDURE — 99214 OFFICE O/P EST MOD 30 MIN: CPT | Performed by: NURSE PRACTITIONER

## 2021-07-15 NOTE — PROGRESS NOTES
Follow Up      Patient Name: Thelma Michael  : 1960   MRN: [id number]     Chief Complaint:    Chief Complaint   Patient presents with   • Follow-up     8 week follow up on Nausea & Vomiting       History of Present Illness: Thelma Michael is a 60 y.o. female who is here today for follow up on nausea and anemia.  Since her last visit, Thelma has been admitted to [facility] on  for DKA and hypertensive urgency.  During her admission she underwent an EGD on  which was consistent with mild gastritis.  She did undergo GES on  which showed 25% emptying of her stomach at 90 minutes consistent with gastroparesis.  She did meet with a dietitian prior to DC regarding this.  Currently being considered for insulin pump for diabetes.  She follows with Dr. Ochoa, her next appointment is in September.  She is also being seen for elevated liver enzymes.  Her last blood work reveals normalization of her liver function studies.    Since her discharge her n/v have improved on reglan and with diet changes.  There are plans to start on insulin pump in the next few weeks.  Her appetite is good, she is eating well and taking megace. Has been able to gain some weight.  She is having a normal BM about one time daily without abdominal pain.  There is no blood in her stool.  Her blood counts reveal worsening anemia since discharge. Her last colonoscopy was in  with a 1 year recommended recall.  Subjective      Review of Systems:   Review of Systems   Constitutional: Negative for appetite change and unexpected weight loss.   HENT: Negative for trouble swallowing.    Gastrointestinal: Positive for vomiting. Negative for abdominal distention, abdominal pain, anal bleeding, blood in stool, constipation, diarrhea, nausea, rectal pain, GERD and indigestion.       Medications:     Current Outpatient Medications:   •  acetaminophen (TYLENOL) 325 MG tablet, Take 2 tablets by mouth Every 4  (Four) Hours As Needed for Mild Pain ., Disp: , Rfl:   •  albuterol sulfate  (90 Base) MCG/ACT inhaler, Inhale 2 puffs Every 4 (Four) Hours As Needed for Wheezing., Disp: 18 g, Rfl: 5  •  amLODIPine (NORVASC) 10 MG tablet, Take 1 tablet by mouth Daily., Disp: 30 tablet, Rfl: 5  •  Ascorbic Acid (VITAMIN C PO), Vitamin C TABS, Disp: , Rfl:   •  aspirin 81 MG EC tablet, Take 1 tablet by mouth Daily., Disp: , Rfl:   •  atorvastatin (LIPITOR) 80 MG tablet, TAKE 1 TABLET BY MOUTH ONCE DAILY AT NIGHT, Disp: 90 tablet, Rfl: 0  •  calcium carbonate (TUMS) 500 MG chewable tablet, Chew 1,000 mg 3 (Three) Times a Day As Needed for Indigestion or Heartburn., Disp: , Rfl:   •  Continuous Blood Gluc  (Dexcom G6 ) device, 1 kit Every 3 (Three) Months., Disp: 1 each, Rfl: 0  •  Continuous Blood Gluc Sensor (Dexcom G6 Sensor), Every 10 (Ten) Days., Disp: 9 each, Rfl: 3  •  Continuous Blood Gluc Transmit (Dexcom G6 Transmitter) misc, 1 kit Every 3 (Three) Months., Disp: 1 each, Rfl: 3  •  dicyclomine (Bentyl) 20 MG tablet, Take 1 tablet by mouth Every 6 (Six) Hours As Needed (diarrhea)., Disp: 60 tablet, Rfl: 5  •  donepezil (Aricept) 5 MG tablet, Take 1 tablet by mouth Every Night., Disp: 30 tablet, Rfl: 1  •  doxazosin (CARDURA) 1 MG tablet, TAKE 1 TABLET BY MOUTH ONCE DAILY AT NIGHT, Disp: 90 tablet, Rfl: 0  •  ferrous sulfate 325 (65 FE) MG tablet, Take 1 tablet by mouth Daily With Breakfast. Take with orange juice or vitamin C, Disp: 30 tablet, Rfl: 2  •  FLUoxetine (PROzac) 20 MG capsule, Take 1 capsule by mouth Daily., Disp: 30 capsule, Rfl: 1  •  gabapentin (NEURONTIN) 400 MG capsule, Take 1 capsule by mouth 3 (Three) Times a Day., Disp: 90 capsule, Rfl: 1  •  glucose blood (Glucose Meter Test) test strip, Use as instructed to check blood glucose levels 3 times daily, Disp: 100 each, Rfl: 5  •  Insulin Glargine (BASAGLAR KWIKPEN) 100 UNIT/ML injection pen, Inject 18 Units under the skin into the  appropriate area as directed Every Night. (Patient taking differently: Inject 20 Units under the skin into the appropriate area as directed Every Night.), Disp: 5 pen, Rfl: 2  •  Insulin Lispro (ADMELOG SOLOSTAR) 100 UNIT/ML injection pen, Inject 0-14 units under skin into appropriate area based upon sliding scale 3 times daily with meals as needed. Do not exceed 40 units per day., Disp: 5 pen, Rfl: 5  •  Insulin Pen Needle (BD Pen Needle Cydney U/F) 32G X 4 MM misc, Take 1 each by mouth 4 (Four) Times a Day., Disp: 100 each, Rfl: 5  •  megestrol (MEGACE) 40 MG tablet, Take 1 tablet by mouth Daily., Disp: 30 tablet, Rfl: 2  •  melatonin 5 MG tablet tablet, Take 1 tablet by mouth At Night As Needed (sleep)., Disp: , Rfl:   •  metoclopramide (REGLAN) 5 MG tablet, Take 1 tablet by mouth 4 (Four) Times a Day Before Meals & at Bedtime for 14 days., Disp: 56 tablet, Rfl: 0  •  multivitamin (Multiple Vitamin) tablet tablet, Take 1 tablet by mouth Daily., Disp: 30 tablet, Rfl: 11  •  ondansetron (ZOFRAN) 4 MG tablet, Take 1 tablet by mouth Every 6 (Six) Hours As Needed for Nausea or Vomiting., Disp: 15 tablet, Rfl: 0  •  pantoprazole (PROTONIX) 40 MG EC tablet, Take 1 tablet by mouth Daily., Disp: 30 tablet, Rfl: 5  •  sennosides-docusate (senna-docusate sodium) 8.6-50 MG per tablet, Take 2 tablets by mouth 2 (Two) Times a Day As Needed for Constipation., Disp: 60 tablet, Rfl: 5  •  traZODone (DESYREL) 50 MG tablet, TAKE ONE TO TWO TABLETS ONE HOUR BEFORE BEDTIME AS NEEDED FOR SLEEP, Disp: 45 tablet, Rfl: 5  •  vitamin D (ERGOCALCIFEROL) 1.25 MG (34377 UT) capsule capsule, Take 1 capsule by mouth once a week, Disp: 4 capsule, Rfl: 0  •  Wheat Dextrin (Benefiber Drink Mix) pack, Take 1 Scoop by mouth Daily., Disp: 30 each, Rfl: 5    Allergies:   No Known Allergies    Social History:   Social History     Socioeconomic History   • Marital status:      Spouse name: Not on file   • Number of children: Not on file   • Years  "of education: Not on file   • Highest education level: Not on file   Tobacco Use   • Smoking status: Former Smoker     Quit date: 2019     Years since quittin.1   • Smokeless tobacco: Never Used   • Tobacco comment: BC PL never smoker    Vaping Use   • Vaping Use: Never used   Substance and Sexual Activity   • Alcohol use: Yes     Alcohol/week: 1.0 standard drinks     Types: 1 Glasses of wine per week     Comment: ocassional   • Drug use: No   • Sexual activity: Defer     Comment: Single         Surgical History:   Past Surgical History:   Procedure Laterality Date   • COLONOSCOPY     • DENTAL PROCEDURE     • ENDOSCOPY N/A 2021    Procedure: ESOPHAGOGASTRODUODENOSCOPY;  Surgeon: Brunner, Mark I, MD;  Location: [facility];  Service: Gastroenterology;  Laterality: N/A;   • NO PAST SURGERIES          Medical History:   Past Medical History:   Diagnosis Date   • Acid reflux    • Acute bronchitis    • Cardiac murmur    • Diabetes mellitus (CMS/HCC)    • H/O echocardiogram 2012    i. LVEF 65%.ii. Mild LVH.iii. Borderline evidence of atrial septal aneurysm.  No PFO.    • History of nuclear stress test 2014    Negative for ischemia and scars; LVEF 77%.     • Hyperlipidemia    • Hypertension    • Impacted cerumen of both ears    • Migraine    • Self-catheterizes urinary bladder    • Sinusitis    • Stroke (CMS/HCC)    • Tobacco abuse     quit 4 days ago.     • Urticaria         Objective     Physical Exam:  Vital Signs:   Vitals:    07/15/21 1006   BP: 138/72   BP Location: Left arm   Patient Position: Sitting   Cuff Size: Adult   Pulse: 97   Temp: 97.9 °F (36.6 °C)   TempSrc: Temporal   SpO2: 99%   Weight: 61 kg (134 lb 6.4 oz)   Height: 172.7 cm (67.99\")     Body mass index is 20.44 kg/m².     Physical Exam  Vitals and nursing note reviewed.   Constitutional:       General: She is not in acute distress.     Appearance: She is well-developed. She is not diaphoretic.   Eyes:      General: No " scleral icterus.     Extraocular Movements:      Right eye: No nystagmus.      Left eye: No nystagmus.      Conjunctiva/sclera: Conjunctivae normal.      Pupils: Pupils are equal, round, and reactive to light.   Neck:      Thyroid: No thyromegaly.   Cardiovascular:      Rate and Rhythm: Normal rate and regular rhythm.   Pulmonary:      Effort: Pulmonary effort is normal.      Breath sounds: Normal breath sounds.   Abdominal:      General: Bowel sounds are normal. There is distension. There are no signs of injury.      Palpations: Abdomen is soft. There is no shifting dullness, hepatomegaly or splenomegaly.      Tenderness: There is no abdominal tenderness. There is no rebound.      Hernia: No hernia is present.   Musculoskeletal:      Cervical back: Neck supple.      Right lower leg: No edema.      Left lower leg: No edema.   Skin:     General: Skin is warm and dry.      Capillary Refill: Capillary refill takes 2 to 3 seconds.      Coloration: Skin is not jaundiced or pale.      Findings: No bruising or petechiae.      Nails: There is no clubbing.   Neurological:      Mental Status: She is alert and oriented to person, place, and time.   Psychiatric:         Behavior: Behavior normal.         Thought Content: Thought content normal.         Judgment: Judgment normal.         Assessment / Plan      Assessment/Plan:   Diagnoses and all orders for this visit:    1. Gastroparesis due to DM (CMS/HCC) (Primary)  Improved symptoms on reglan. Side effects discussed.  Will complete short course.  Continue diet changes and recommend starting gingerroot with each meal  Will reserve reglan for as needed due to side effects  2. Chronic superficial gastritis without bleeding  - stable, improved, on PPI  3. Anemia, unspecified type  - Unable to complete workup due to DKA. Unfortunetely she will not have the insulin pump for several weeks and does not see endo until September.  With worsening anemia- will need to proceed with  colonoscopy and capsule study to r/o GI blood loss.  Due to risk of DKA with bowel prep- will plan to admit overnight for close monitoring and iv fluids/ glucose control. Discussed with patient and she is agreeable  4. Irritable bowel syndrome with both constipation and diarrhea  - stable, improved  5. Elevated liver enzymes  - resolved         Follow Up:   Return in about 3 months (around 10/15/2021).    Plan of care reviewed with the patient at the conclusion of today's visit.  Education was provided regarding diagnosis, management, and any prescribed or recommended OTC medications.  Patient verbalized understanding of and agreement with management plan.     Time Statement:   Discussed plan of care in detail with patient today. Patient verbally understands and agrees. I have spent 30 minutes reviewing available diagnostics, obtaining history, examining the patient, developing a treatment plan, and educating the patient on disease process and plan of care.     HOWIE Samuels  [facility]

## 2021-07-15 NOTE — OUTREACH NOTE
Medical Week 2 Survey      Responses   [facility] patient discharged from?  [place]   Does the patient have one of the following disease processes/diagnoses(primary or secondary)?  Other   Week 2 attempt successful?  No   Unsuccessful attempts  Attempt 2          Ashlee Milsl RN

## 2021-07-19 DIAGNOSIS — Z12.11 ENCOUNTER FOR SCREENING COLONOSCOPY: Primary | ICD-10-CM

## 2021-07-20 ENCOUNTER — HOSPITAL ENCOUNTER (OUTPATIENT)
Dept: GENERAL RADIOLOGY | Facility: HOSPITAL | Age: 61
Discharge: HOME | End: 2021-07-20
Admitting: NURSE PRACTITIONER

## 2021-07-20 DIAGNOSIS — G89.29 CHRONIC PAIN OF BOTH ANKLES: ICD-10-CM

## 2021-07-20 DIAGNOSIS — M25.571 CHRONIC PAIN OF BOTH ANKLES: ICD-10-CM

## 2021-07-20 DIAGNOSIS — M25.572 CHRONIC PAIN OF BOTH ANKLES: ICD-10-CM

## 2021-07-20 PROCEDURE — 73610 X-RAY EXAM OF ANKLE: CPT

## 2021-07-21 ENCOUNTER — TELEPHONE (OUTPATIENT)
Dept: INTERNAL MEDICINE | Facility: CLINIC | Age: 61
End: 2021-07-21

## 2021-07-21 DIAGNOSIS — M25.50 GENERALIZED JOINT PAIN: Primary | ICD-10-CM

## 2021-07-21 DIAGNOSIS — M19.90 OSTEOARTHRITIS, UNSPECIFIED OSTEOARTHRITIS TYPE, UNSPECIFIED SITE: ICD-10-CM

## 2021-07-21 RX ORDER — TRAMADOL HYDROCHLORIDE 50 MG/1
50 TABLET ORAL EVERY 6 HOURS PRN
Qty: 28 TABLET | Refills: 0 | Status: SHIPPED | OUTPATIENT
Start: 2021-07-21 | End: 2021-10-25

## 2021-07-21 NOTE — TELEPHONE ENCOUNTER
PATIENT IS REQUESTING A NEW/REFILL PRESCRIPTION FOR TRAMADOL.    PATIENT USES [facility] ON [facility].    PATIENT IS CURRENTLY OUT OF MEDICATION.    CALL BACK NUMBER [phone]

## 2021-07-22 ENCOUNTER — READMISSION MANAGEMENT (OUTPATIENT)
Dept: CALL CENTER | Facility: HOSPITAL | Age: 61
End: 2021-07-22

## 2021-07-22 ENCOUNTER — APPOINTMENT (OUTPATIENT)
Dept: NUCLEAR MEDICINE | Facility: HOSPITAL | Age: 61
End: 2021-07-22

## 2021-07-22 NOTE — OUTREACH NOTE
Medical Week 3 Survey      Responses   [facility] patient discharged from?  [place]   Does the patient have one of the following disease processes/diagnoses(primary or secondary)?  Other   Week 3 attempt successful?  No   Unsuccessful attempts  Attempt 1          Suzanne Burrows RN

## 2021-07-23 ENCOUNTER — TELEPHONE (OUTPATIENT)
Dept: INTERNAL MEDICINE | Facility: CLINIC | Age: 61
End: 2021-07-23

## 2021-07-23 NOTE — TELEPHONE ENCOUNTER
PATIENT SAID THAT SHE WAS PRESCRIBED CHERI ROOT AND IT SAYS SHE IS NOT ABLE TO DRINK MILK. SHE HAS A QUESTION ABOUT THIS AND WOULD LIKE A CALLBACK.      CONTACT: [phone]

## 2021-07-23 NOTE — TELEPHONE ENCOUNTER
I didn't prescribe this. It may have been Magali in GI. You can drink milk with lara root but Magali may have her on a dairy restriction due to stomach issue. Ask her to follow up with GI to be safe.

## 2021-07-26 ENCOUNTER — ANESTHESIA EVENT (OUTPATIENT)
Dept: GASTROENTEROLOGY | Facility: HOSPITAL | Age: 61
End: 2021-07-26

## 2021-07-26 ENCOUNTER — HOSPITAL ENCOUNTER (OUTPATIENT)
Facility: HOSPITAL | Age: 61
Discharge: HOME | End: 2021-07-28
Attending: HOSPITALIST | Admitting: INTERNAL MEDICINE

## 2021-07-26 DIAGNOSIS — R11.2 NAUSEA AND VOMITING, INTRACTABILITY OF VOMITING NOT SPECIFIED, UNSPECIFIED VOMITING TYPE: ICD-10-CM

## 2021-07-26 PROBLEM — K31.84 GASTROPARESIS: Status: ACTIVE | Noted: 2021-07-26

## 2021-07-26 LAB
ANION GAP SERPL CALCULATED.3IONS-SCNC: 13 MMOL/L (ref 5–15)
BASOPHILS # BLD AUTO: 0.03 10*3/MM3 (ref 0–0.2)
BASOPHILS NFR BLD AUTO: 0.5 % (ref 0–1.5)
BUN SERPL-MCNC: 20 MG/DL (ref 8–23)
BUN/CREAT SERPL: 25.3 (ref 7–25)
CALCIUM SPEC-SCNC: 9.2 MG/DL (ref 8.6–10.5)
CHLORIDE SERPL-SCNC: 103 MMOL/L (ref 98–107)
CO2 SERPL-SCNC: 23 MMOL/L (ref 22–29)
CREAT SERPL-MCNC: 0.79 MG/DL (ref 0.57–1)
DEPRECATED RDW RBC AUTO: 45.3 FL (ref 37–54)
EOSINOPHIL # BLD AUTO: 0.2 10*3/MM3 (ref 0–0.4)
EOSINOPHIL NFR BLD AUTO: 3.7 % (ref 0.3–6.2)
ERYTHROCYTE [DISTWIDTH] IN BLOOD BY AUTOMATED COUNT: 13.4 % (ref 12.3–15.4)
GFR SERPL CREATININE-BSD FRML MDRD: 89 ML/MIN/1.73
GLUCOSE BLDC GLUCOMTR-MCNC: 113 MG/DL (ref 70–130)
GLUCOSE BLDC GLUCOMTR-MCNC: 260 MG/DL (ref 70–130)
GLUCOSE SERPL-MCNC: 206 MG/DL (ref 65–99)
HCT VFR BLD AUTO: 37.2 % (ref 34–46.6)
HGB BLD-MCNC: 12.3 G/DL (ref 12–15.9)
IMM GRANULOCYTES # BLD AUTO: 0.01 10*3/MM3 (ref 0–0.05)
IMM GRANULOCYTES NFR BLD AUTO: 0.2 % (ref 0–0.5)
LYMPHOCYTES # BLD AUTO: 2.06 10*3/MM3 (ref 0.7–3.1)
LYMPHOCYTES NFR BLD AUTO: 37.7 % (ref 19.6–45.3)
MCH RBC QN AUTO: 30.4 PG (ref 26.6–33)
MCHC RBC AUTO-ENTMCNC: 33.1 G/DL (ref 31.5–35.7)
MCV RBC AUTO: 91.9 FL (ref 79–97)
MONOCYTES # BLD AUTO: 0.44 10*3/MM3 (ref 0.1–0.9)
MONOCYTES NFR BLD AUTO: 8.1 % (ref 5–12)
NEUTROPHILS NFR BLD AUTO: 2.72 10*3/MM3 (ref 1.7–7)
NEUTROPHILS NFR BLD AUTO: 49.8 % (ref 42.7–76)
NRBC BLD AUTO-RTO: 0 /100 WBC (ref 0–0.2)
PLATELET # BLD AUTO: 413 10*3/MM3 (ref 140–450)
PMV BLD AUTO: 9.5 FL (ref 6–12)
POTASSIUM SERPL-SCNC: 3.9 MMOL/L (ref 3.5–5.2)
RBC # BLD AUTO: 4.05 10*6/MM3 (ref 3.77–5.28)
SARS-COV-2 RDRP RESP QL NAA+PROBE: NORMAL
SODIUM SERPL-SCNC: 139 MMOL/L (ref 136–145)
WBC # BLD AUTO: 5.46 10*3/MM3 (ref 3.4–10.8)

## 2021-07-26 PROCEDURE — 82962 GLUCOSE BLOOD TEST: CPT

## 2021-07-26 PROCEDURE — 63710000001 ONDANSETRON PER 8 MG: Performed by: FAMILY MEDICINE

## 2021-07-26 PROCEDURE — 87635 SARS-COV-2 COVID-19 AMP PRB: CPT | Performed by: FAMILY MEDICINE

## 2021-07-26 PROCEDURE — 80048 BASIC METABOLIC PNL TOTAL CA: CPT | Performed by: FAMILY MEDICINE

## 2021-07-26 PROCEDURE — 63710000001 INSULIN DETEMIR PER 5 UNITS: Performed by: FAMILY MEDICINE

## 2021-07-26 PROCEDURE — C9803 HOPD COVID-19 SPEC COLLECT: HCPCS

## 2021-07-26 PROCEDURE — 99223 1ST HOSP IP/OBS HIGH 75: CPT | Performed by: FAMILY MEDICINE

## 2021-07-26 PROCEDURE — 63710000001 INSULIN LISPRO (HUMAN) PER 5 UNITS: Performed by: FAMILY MEDICINE

## 2021-07-26 PROCEDURE — G0378 HOSPITAL OBSERVATION PER HR: HCPCS

## 2021-07-26 PROCEDURE — 85025 COMPLETE CBC W/AUTO DIFF WBC: CPT | Performed by: FAMILY MEDICINE

## 2021-07-26 RX ORDER — SODIUM CHLORIDE 0.9 % (FLUSH) 0.9 %
10 SYRINGE (ML) INJECTION EVERY 12 HOURS SCHEDULED
Status: DISCONTINUED | OUTPATIENT
Start: 2021-07-26 | End: 2021-07-28 | Stop reason: HOSPADM

## 2021-07-26 RX ORDER — GABAPENTIN 400 MG/1
400 CAPSULE ORAL 3 TIMES DAILY
Status: DISCONTINUED | OUTPATIENT
Start: 2021-07-26 | End: 2021-07-28 | Stop reason: HOSPADM

## 2021-07-26 RX ORDER — DONEPEZIL HYDROCHLORIDE 5 MG/1
5 TABLET, FILM COATED ORAL NIGHTLY
Status: DISCONTINUED | OUTPATIENT
Start: 2021-07-26 | End: 2021-07-28 | Stop reason: HOSPADM

## 2021-07-26 RX ORDER — SODIUM CHLORIDE 0.9 % (FLUSH) 0.9 %
10 SYRINGE (ML) INJECTION AS NEEDED
Status: DISCONTINUED | OUTPATIENT
Start: 2021-07-26 | End: 2021-07-28 | Stop reason: HOSPADM

## 2021-07-26 RX ORDER — ATORVASTATIN CALCIUM 40 MG/1
80 TABLET, FILM COATED ORAL NIGHTLY
Status: DISCONTINUED | OUTPATIENT
Start: 2021-07-26 | End: 2021-07-28 | Stop reason: HOSPADM

## 2021-07-26 RX ORDER — ACETAMINOPHEN 650 MG/1
650 SUPPOSITORY RECTAL EVERY 4 HOURS PRN
Status: DISCONTINUED | OUTPATIENT
Start: 2021-07-26 | End: 2021-07-28 | Stop reason: HOSPADM

## 2021-07-26 RX ORDER — SODIUM CHLORIDE 0.9 % (FLUSH) 0.9 %
10 SYRINGE (ML) INJECTION AS NEEDED
Status: CANCELLED | OUTPATIENT
Start: 2021-07-26

## 2021-07-26 RX ORDER — SODIUM CHLORIDE 0.9 % (FLUSH) 0.9 %
10 SYRINGE (ML) INJECTION EVERY 12 HOURS SCHEDULED
Status: CANCELLED | OUTPATIENT
Start: 2021-07-26

## 2021-07-26 RX ORDER — PANTOPRAZOLE SODIUM 40 MG/1
40 TABLET, DELAYED RELEASE ORAL
Status: DISCONTINUED | OUTPATIENT
Start: 2021-07-27 | End: 2021-07-28 | Stop reason: HOSPADM

## 2021-07-26 RX ORDER — DEXTROSE MONOHYDRATE 25 G/50ML
25 INJECTION, SOLUTION INTRAVENOUS
Status: DISCONTINUED | OUTPATIENT
Start: 2021-07-26 | End: 2021-07-28 | Stop reason: HOSPADM

## 2021-07-26 RX ORDER — ACETAMINOPHEN 160 MG/5ML
650 SOLUTION ORAL EVERY 4 HOURS PRN
Status: DISCONTINUED | OUTPATIENT
Start: 2021-07-26 | End: 2021-07-28 | Stop reason: HOSPADM

## 2021-07-26 RX ORDER — AMLODIPINE BESYLATE 10 MG/1
10 TABLET ORAL
Status: DISCONTINUED | OUTPATIENT
Start: 2021-07-27 | End: 2021-07-28 | Stop reason: HOSPADM

## 2021-07-26 RX ORDER — ALBUTEROL SULFATE 2.5 MG/3ML
2.5 SOLUTION RESPIRATORY (INHALATION) EVERY 6 HOURS PRN
Status: DISCONTINUED | OUTPATIENT
Start: 2021-07-26 | End: 2021-07-28 | Stop reason: HOSPADM

## 2021-07-26 RX ORDER — TRAZODONE HYDROCHLORIDE 50 MG/1
50 TABLET ORAL NIGHTLY PRN
Status: DISCONTINUED | OUTPATIENT
Start: 2021-07-26 | End: 2021-07-28 | Stop reason: HOSPADM

## 2021-07-26 RX ORDER — ONDANSETRON 2 MG/ML
4 INJECTION INTRAMUSCULAR; INTRAVENOUS EVERY 6 HOURS PRN
Status: DISCONTINUED | OUTPATIENT
Start: 2021-07-26 | End: 2021-07-28 | Stop reason: HOSPADM

## 2021-07-26 RX ORDER — NICOTINE POLACRILEX 4 MG
15 LOZENGE BUCCAL
Status: DISCONTINUED | OUTPATIENT
Start: 2021-07-26 | End: 2021-07-28 | Stop reason: HOSPADM

## 2021-07-26 RX ORDER — MIDAZOLAM HYDROCHLORIDE 1 MG/ML
1 INJECTION INTRAMUSCULAR; INTRAVENOUS
Status: CANCELLED | OUTPATIENT
Start: 2021-07-26

## 2021-07-26 RX ORDER — CHOLECALCIFEROL (VITAMIN D3) 125 MCG
5 CAPSULE ORAL NIGHTLY PRN
Status: DISCONTINUED | OUTPATIENT
Start: 2021-07-26 | End: 2021-07-28 | Stop reason: HOSPADM

## 2021-07-26 RX ORDER — LIDOCAINE HYDROCHLORIDE 10 MG/ML
0.5 INJECTION, SOLUTION EPIDURAL; INFILTRATION; INTRACAUDAL; PERINEURAL ONCE AS NEEDED
Status: CANCELLED | OUTPATIENT
Start: 2021-07-26

## 2021-07-26 RX ORDER — TERAZOSIN 1 MG/1
1 CAPSULE ORAL NIGHTLY
Refills: 0 | Status: DISCONTINUED | OUTPATIENT
Start: 2021-07-26 | End: 2021-07-28 | Stop reason: HOSPADM

## 2021-07-26 RX ORDER — ONDANSETRON 4 MG/1
4 TABLET, FILM COATED ORAL EVERY 6 HOURS PRN
Status: DISCONTINUED | OUTPATIENT
Start: 2021-07-26 | End: 2021-07-28 | Stop reason: HOSPADM

## 2021-07-26 RX ORDER — FERROUS SULFATE 325(65) MG
325 TABLET ORAL
Status: DISCONTINUED | OUTPATIENT
Start: 2021-07-27 | End: 2021-07-28 | Stop reason: HOSPADM

## 2021-07-26 RX ORDER — FLUOXETINE HYDROCHLORIDE 20 MG/1
20 CAPSULE ORAL DAILY
Status: DISCONTINUED | OUTPATIENT
Start: 2021-07-27 | End: 2021-07-28 | Stop reason: HOSPADM

## 2021-07-26 RX ORDER — MEGESTROL ACETATE 40 MG/1
40 TABLET ORAL DAILY
Status: DISCONTINUED | OUTPATIENT
Start: 2021-07-27 | End: 2021-07-28 | Stop reason: HOSPADM

## 2021-07-26 RX ORDER — TRAMADOL HYDROCHLORIDE 50 MG/1
50 TABLET ORAL EVERY 6 HOURS PRN
Status: DISCONTINUED | OUTPATIENT
Start: 2021-07-26 | End: 2021-07-28 | Stop reason: HOSPADM

## 2021-07-26 RX ORDER — SODIUM CHLORIDE, SODIUM LACTATE, POTASSIUM CHLORIDE, CALCIUM CHLORIDE 600; 310; 30; 20 MG/100ML; MG/100ML; MG/100ML; MG/100ML
9 INJECTION, SOLUTION INTRAVENOUS CONTINUOUS
Status: CANCELLED | OUTPATIENT
Start: 2021-07-26

## 2021-07-26 RX ORDER — ACETAMINOPHEN 325 MG/1
650 TABLET ORAL EVERY 4 HOURS PRN
Status: DISCONTINUED | OUTPATIENT
Start: 2021-07-26 | End: 2021-07-28 | Stop reason: HOSPADM

## 2021-07-26 RX ADMIN — GABAPENTIN 400 MG: 400 CAPSULE ORAL at 21:09

## 2021-07-26 RX ADMIN — TERAZOSIN HYDROCHLORIDE 1 MG: 1 CAPSULE ORAL at 21:09

## 2021-07-26 RX ADMIN — INSULIN LISPRO 4 UNITS: 100 INJECTION, SOLUTION INTRAVENOUS; SUBCUTANEOUS at 18:03

## 2021-07-26 RX ADMIN — DONEPEZIL HYDROCHLORIDE 5 MG: 5 TABLET ORAL at 21:09

## 2021-07-26 RX ADMIN — INSULIN DETEMIR 10 UNITS: 100 INJECTION, SOLUTION SUBCUTANEOUS at 21:08

## 2021-07-26 RX ADMIN — TRAMADOL HYDROCHLORIDE 50 MG: 50 TABLET, FILM COATED ORAL at 18:45

## 2021-07-26 RX ADMIN — GABAPENTIN 400 MG: 400 CAPSULE ORAL at 18:03

## 2021-07-26 RX ADMIN — ONDANSETRON HYDROCHLORIDE 4 MG: 4 TABLET, FILM COATED ORAL at 18:05

## 2021-07-26 RX ADMIN — ATORVASTATIN CALCIUM 80 MG: 40 TABLET, FILM COATED ORAL at 21:09

## 2021-07-26 RX ADMIN — Medication 12 TABLET: at 21:08

## 2021-07-27 ENCOUNTER — READMISSION MANAGEMENT (OUTPATIENT)
Dept: CALL CENTER | Facility: HOSPITAL | Age: 61
End: 2021-07-27

## 2021-07-27 ENCOUNTER — ANESTHESIA (OUTPATIENT)
Dept: GASTROENTEROLOGY | Facility: HOSPITAL | Age: 61
End: 2021-07-27

## 2021-07-27 LAB
GLUCOSE BLDC GLUCOMTR-MCNC: 100 MG/DL (ref 70–130)
GLUCOSE BLDC GLUCOMTR-MCNC: 135 MG/DL (ref 70–130)
GLUCOSE BLDC GLUCOMTR-MCNC: 146 MG/DL (ref 70–130)
GLUCOSE BLDC GLUCOMTR-MCNC: 346 MG/DL (ref 70–130)
GLUCOSE BLDC GLUCOMTR-MCNC: 378 MG/DL (ref 70–130)

## 2021-07-27 PROCEDURE — 88305 TISSUE EXAM BY PATHOLOGIST: CPT | Performed by: INTERNAL MEDICINE

## 2021-07-27 PROCEDURE — C1889 IMPLANT/INSERT DEVICE, NOC: HCPCS | Performed by: INTERNAL MEDICINE

## 2021-07-27 PROCEDURE — 25010000002 PROPOFOL 10 MG/ML EMULSION: Performed by: NURSE ANESTHETIST, CERTIFIED REGISTERED

## 2021-07-27 PROCEDURE — 82962 GLUCOSE BLOOD TEST: CPT

## 2021-07-27 PROCEDURE — 91110 GI TRC IMG INTRAL ESOPH-ILE: CPT | Performed by: INTERNAL MEDICINE

## 2021-07-27 PROCEDURE — 45385 COLONOSCOPY W/LESION REMOVAL: CPT | Performed by: INTERNAL MEDICINE

## 2021-07-27 PROCEDURE — 63710000001 INSULIN DETEMIR PER 5 UNITS: Performed by: FAMILY MEDICINE

## 2021-07-27 PROCEDURE — 99226 PR SBSQ OBSERVATION CARE/DAY 35 MINUTES: CPT | Performed by: FAMILY MEDICINE

## 2021-07-27 PROCEDURE — 43235 EGD DIAGNOSTIC BRUSH WASH: CPT | Performed by: INTERNAL MEDICINE

## 2021-07-27 PROCEDURE — 63710000001 INSULIN LISPRO (HUMAN) PER 5 UNITS: Performed by: NURSE ANESTHETIST, CERTIFIED REGISTERED

## 2021-07-27 PROCEDURE — G0378 HOSPITAL OBSERVATION PER HR: HCPCS

## 2021-07-27 PROCEDURE — 45380 COLONOSCOPY AND BIOPSY: CPT | Performed by: INTERNAL MEDICINE

## 2021-07-27 RX ORDER — FENTANYL CITRATE 50 UG/ML
50 INJECTION, SOLUTION INTRAMUSCULAR; INTRAVENOUS
Status: DISCONTINUED | OUTPATIENT
Start: 2021-07-27 | End: 2021-07-27 | Stop reason: HOSPADM

## 2021-07-27 RX ORDER — SODIUM CHLORIDE, SODIUM LACTATE, POTASSIUM CHLORIDE, CALCIUM CHLORIDE 600; 310; 30; 20 MG/100ML; MG/100ML; MG/100ML; MG/100ML
9 INJECTION, SOLUTION INTRAVENOUS CONTINUOUS
Status: DISCONTINUED | OUTPATIENT
Start: 2021-07-27 | End: 2021-07-28 | Stop reason: HOSPADM

## 2021-07-27 RX ORDER — MIDAZOLAM HYDROCHLORIDE 1 MG/ML
1 INJECTION INTRAMUSCULAR; INTRAVENOUS
Status: DISCONTINUED | OUTPATIENT
Start: 2021-07-27 | End: 2021-07-27 | Stop reason: HOSPADM

## 2021-07-27 RX ORDER — PROPOFOL 10 MG/ML
VIAL (ML) INTRAVENOUS AS NEEDED
Status: DISCONTINUED | OUTPATIENT
Start: 2021-07-27 | End: 2021-07-27 | Stop reason: SURG

## 2021-07-27 RX ORDER — HYDROMORPHONE HYDROCHLORIDE 1 MG/ML
0.5 INJECTION, SOLUTION INTRAMUSCULAR; INTRAVENOUS; SUBCUTANEOUS
Status: DISCONTINUED | OUTPATIENT
Start: 2021-07-27 | End: 2021-07-27 | Stop reason: HOSPADM

## 2021-07-27 RX ORDER — SODIUM CHLORIDE 0.9 % (FLUSH) 0.9 %
10 SYRINGE (ML) INJECTION AS NEEDED
Status: DISCONTINUED | OUTPATIENT
Start: 2021-07-27 | End: 2021-07-27 | Stop reason: HOSPADM

## 2021-07-27 RX ORDER — ONDANSETRON 2 MG/ML
4 INJECTION INTRAMUSCULAR; INTRAVENOUS ONCE AS NEEDED
Status: DISCONTINUED | OUTPATIENT
Start: 2021-07-27 | End: 2021-07-27 | Stop reason: HOSPADM

## 2021-07-27 RX ADMIN — ATORVASTATIN CALCIUM 80 MG: 40 TABLET, FILM COATED ORAL at 21:03

## 2021-07-27 RX ADMIN — PROPOFOL 50 MG: 10 INJECTION, EMULSION INTRAVENOUS at 11:10

## 2021-07-27 RX ADMIN — SODIUM CHLORIDE, POTASSIUM CHLORIDE, SODIUM LACTATE AND CALCIUM CHLORIDE 9 ML/HR: 600; 310; 30; 20 INJECTION, SOLUTION INTRAVENOUS at 10:45

## 2021-07-27 RX ADMIN — FLUOXETINE HYDROCHLORIDE 20 MG: 20 CAPSULE ORAL at 09:01

## 2021-07-27 RX ADMIN — GABAPENTIN 400 MG: 400 CAPSULE ORAL at 17:36

## 2021-07-27 RX ADMIN — TERAZOSIN HYDROCHLORIDE 1 MG: 1 CAPSULE ORAL at 21:03

## 2021-07-27 RX ADMIN — GABAPENTIN 400 MG: 400 CAPSULE ORAL at 21:03

## 2021-07-27 RX ADMIN — SODIUM CHLORIDE, PRESERVATIVE FREE 10 ML: 5 INJECTION INTRAVENOUS at 09:04

## 2021-07-27 RX ADMIN — Medication 12 TABLET: at 03:57

## 2021-07-27 RX ADMIN — SODIUM CHLORIDE, PRESERVATIVE FREE 10 ML: 5 INJECTION INTRAVENOUS at 21:05

## 2021-07-27 RX ADMIN — MEGESTROL ACETATE 40 MG: 40 TABLET ORAL at 09:01

## 2021-07-27 RX ADMIN — AMLODIPINE BESYLATE 10 MG: 10 TABLET ORAL at 09:01

## 2021-07-27 RX ADMIN — PROPOFOL 100 MG: 10 INJECTION, EMULSION INTRAVENOUS at 11:08

## 2021-07-27 RX ADMIN — TRAMADOL HYDROCHLORIDE 50 MG: 50 TABLET, FILM COATED ORAL at 21:03

## 2021-07-27 RX ADMIN — GABAPENTIN 400 MG: 400 CAPSULE ORAL at 09:01

## 2021-07-27 RX ADMIN — FERROUS SULFATE TAB 325 MG (65 MG ELEMENTAL FE) 325 MG: 325 (65 FE) TAB at 09:01

## 2021-07-27 RX ADMIN — PROPOFOL 130 MCG/KG/MIN: 10 INJECTION, EMULSION INTRAVENOUS at 11:09

## 2021-07-27 RX ADMIN — SODIUM CHLORIDE, PRESERVATIVE FREE 10 ML: 5 INJECTION INTRAVENOUS at 10:46

## 2021-07-27 RX ADMIN — DONEPEZIL HYDROCHLORIDE 5 MG: 5 TABLET ORAL at 21:03

## 2021-07-27 RX ADMIN — PANTOPRAZOLE SODIUM 40 MG: 40 TABLET, DELAYED RELEASE ORAL at 09:01

## 2021-07-27 RX ADMIN — INSULIN DETEMIR 10 UNITS: 100 INJECTION, SOLUTION SUBCUTANEOUS at 21:03

## 2021-07-27 NOTE — ANESTHESIA POSTPROCEDURE EVALUATION
Patient: Thelma Michael    Procedure Summary     Date: 07/27/21 Room / Location:  [facility]    Anesthesia Start: 1102 Anesthesia Stop:     Procedures:       COLONOSCOPY (N/A )      ESOPHAGOGASTRODUODENOSCOPY WITH PILL CAM (N/A ) Diagnosis:       Nausea and vomiting, intractability of vomiting not specified, unspecified vomiting type      (Nausea and vomiting, intractability of vomiting not specified, unspecified vomiting type [R11.2])    Surgeons: Mikael Worthy MD Provider: Gene Li MD    Anesthesia Type: general ASA Status: 3          Anesthesia Type: general    Vitals  No vitals data found for the desired time range.          Post Anesthesia Care and Evaluation    Patient location during evaluation: PACU  Patient participation: complete - patient participated  Level of consciousness: awake and alert  Pain management: adequate  Airway patency: patent  Anesthetic complications: No anesthetic complications  PONV Status: none  Cardiovascular status: hemodynamically stable and acceptable  Respiratory status: nonlabored ventilation, acceptable and nasal cannula  Hydration status: acceptable

## 2021-07-27 NOTE — OUTREACH NOTE
Medical Week 3 Survey      Responses   [facility] patient discharged from?  [place]   Does the patient have one of the following disease processes/diagnoses(primary or secondary)?  Other   Week 3 attempt successful?  No   Unsuccessful attempts  Attempt 1   Revoke  Readmitted          Elvi Wells RN

## 2021-07-27 NOTE — ANESTHESIA PREPROCEDURE EVALUATION
Anesthesia Evaluation                  Airway   Mallampati: II  Dental      Pulmonary    Cardiovascular     (+) hypertension,       Neuro/Psych  GI/Hepatic/Renal/Endo    (+)   diabetes mellitus,     Musculoskeletal     (+) back pain,   Abdominal    Substance History      OB/GYN          Other                        Anesthesia Plan    ASA 3     general     intravenous induction     Anesthetic plan, all risks, benefits, and alternatives have been provided, discussed and informed consent has been obtained with: patient.

## 2021-07-28 ENCOUNTER — READMISSION MANAGEMENT (OUTPATIENT)
Dept: CALL CENTER | Facility: HOSPITAL | Age: 61
End: 2021-07-28

## 2021-07-28 ENCOUNTER — TELEPHONE (OUTPATIENT)
Dept: GASTROENTEROLOGY | Facility: CLINIC | Age: 61
End: 2021-07-28

## 2021-07-28 VITALS
OXYGEN SATURATION: 99 % | HEIGHT: 68 IN | HEART RATE: 85 BPM | WEIGHT: 132.4 LBS | RESPIRATION RATE: 16 BRPM | SYSTOLIC BLOOD PRESSURE: 138 MMHG | TEMPERATURE: 97.9 F | DIASTOLIC BLOOD PRESSURE: 71 MMHG | BODY MASS INDEX: 20.07 KG/M2

## 2021-07-28 PROBLEM — R11.2 NAUSEA AND VOMITING: Status: RESOLVED | Noted: 2021-07-15 | Resolved: 2021-07-28

## 2021-07-28 LAB
ANION GAP SERPL CALCULATED.3IONS-SCNC: 9 MMOL/L (ref 5–15)
BUN SERPL-MCNC: 12 MG/DL (ref 8–23)
BUN/CREAT SERPL: 14.5 (ref 7–25)
CALCIUM SPEC-SCNC: 9.3 MG/DL (ref 8.6–10.5)
CHLORIDE SERPL-SCNC: 105 MMOL/L (ref 98–107)
CO2 SERPL-SCNC: 27 MMOL/L (ref 22–29)
CREAT SERPL-MCNC: 0.83 MG/DL (ref 0.57–1)
CYTO UR: NORMAL
DEPRECATED RDW RBC AUTO: 43.8 FL (ref 37–54)
ERYTHROCYTE [DISTWIDTH] IN BLOOD BY AUTOMATED COUNT: 13.1 % (ref 12.3–15.4)
GFR SERPL CREATININE-BSD FRML MDRD: 84 ML/MIN/1.73
GLUCOSE BLDC GLUCOMTR-MCNC: 198 MG/DL (ref 70–130)
GLUCOSE BLDC GLUCOMTR-MCNC: 85 MG/DL (ref 70–130)
GLUCOSE SERPL-MCNC: 114 MG/DL (ref 65–99)
HCT VFR BLD AUTO: 34.6 % (ref 34–46.6)
HGB BLD-MCNC: 11.4 G/DL (ref 12–15.9)
LAB AP CASE REPORT: NORMAL
LAB AP CLINICAL INFORMATION: NORMAL
MCH RBC QN AUTO: 30 PG (ref 26.6–33)
MCHC RBC AUTO-ENTMCNC: 32.9 G/DL (ref 31.5–35.7)
MCV RBC AUTO: 91.1 FL (ref 79–97)
PATH REPORT.FINAL DX SPEC: NORMAL
PATH REPORT.GROSS SPEC: NORMAL
PLATELET # BLD AUTO: 404 10*3/MM3 (ref 140–450)
PMV BLD AUTO: 9.2 FL (ref 6–12)
POTASSIUM SERPL-SCNC: 4 MMOL/L (ref 3.5–5.2)
RBC # BLD AUTO: 3.8 10*6/MM3 (ref 3.77–5.28)
SODIUM SERPL-SCNC: 141 MMOL/L (ref 136–145)
WBC # BLD AUTO: 5.51 10*3/MM3 (ref 3.4–10.8)

## 2021-07-28 PROCEDURE — 85027 COMPLETE CBC AUTOMATED: CPT | Performed by: FAMILY MEDICINE

## 2021-07-28 PROCEDURE — 82962 GLUCOSE BLOOD TEST: CPT

## 2021-07-28 PROCEDURE — 80048 BASIC METABOLIC PNL TOTAL CA: CPT | Performed by: FAMILY MEDICINE

## 2021-07-28 PROCEDURE — 99217 PR OBSERVATION CARE DISCHARGE MANAGEMENT: CPT | Performed by: FAMILY MEDICINE

## 2021-07-28 PROCEDURE — G0378 HOSPITAL OBSERVATION PER HR: HCPCS

## 2021-07-28 RX ADMIN — AMLODIPINE BESYLATE 10 MG: 10 TABLET ORAL at 09:07

## 2021-07-28 RX ADMIN — FERROUS SULFATE TAB 325 MG (65 MG ELEMENTAL FE) 325 MG: 325 (65 FE) TAB at 09:07

## 2021-07-28 RX ADMIN — PANTOPRAZOLE SODIUM 40 MG: 40 TABLET, DELAYED RELEASE ORAL at 09:07

## 2021-07-28 RX ADMIN — GABAPENTIN 400 MG: 400 CAPSULE ORAL at 09:07

## 2021-07-28 RX ADMIN — FLUOXETINE HYDROCHLORIDE 20 MG: 20 CAPSULE ORAL at 09:07

## 2021-07-28 NOTE — OUTREACH NOTE
Prep Survey      Responses   [facility] patient discharged from?  [place]   Is LACE score < 7 ?  No   Emergency Room discharge w/ pulse ox?  No   Eligibility  [facility]   Date of Admission  07/26/21   Date of Discharge  07/28/21   Discharge Disposition  Home or Self Care   Discharge diagnosis  Gastroparesis   Does the patient have one of the following disease processes/diagnoses(primary or secondary)?  Other   Does the patient have Home health ordered?  No   Is there a DME ordered?  No   Prep survey completed?  Yes          Alejandra Pugh RN

## 2021-07-28 NOTE — TELEPHONE ENCOUNTER
Patient is being discharged from the hospital today and they are requesting a 1 week follow-up appointment with you.  Please review and advise.

## 2021-07-29 ENCOUNTER — TRANSITIONAL CARE MANAGEMENT TELEPHONE ENCOUNTER (OUTPATIENT)
Dept: CALL CENTER | Facility: HOSPITAL | Age: 61
End: 2021-07-29

## 2021-07-29 ENCOUNTER — APPOINTMENT (OUTPATIENT)
Dept: DIABETES SERVICES | Facility: HOSPITAL | Age: 61
End: 2021-07-29

## 2021-07-29 NOTE — OUTREACH NOTE
Call Center TCM Note      Responses   [facility] patient discharged from?  [place]   Does the patient have one of the following disease processes/diagnoses(primary or secondary)?  Other   TCM attempt successful?  No   Unsuccessful attempts  Attempt 2          Irma Lamas RN    7/29/2021, 13:15 EDT

## 2021-07-29 NOTE — OUTREACH NOTE
Call Center TCM Note      Responses   [facility] patient discharged from?  [place]   Does the patient have one of the following disease processes/diagnoses(primary or secondary)?  Other   TCM attempt successful?  No   Unsuccessful attempts  Attempt 1          Irma Lamas RN    7/29/2021, 12:02 EDT

## 2021-07-30 ENCOUNTER — TRANSITIONAL CARE MANAGEMENT TELEPHONE ENCOUNTER (OUTPATIENT)
Dept: CALL CENTER | Facility: HOSPITAL | Age: 61
End: 2021-07-30

## 2021-07-30 NOTE — OUTREACH NOTE
Call Center TCM Note      Responses   [facility] patient discharged from?  [place]   Does the patient have one of the following disease processes/diagnoses(primary or secondary)?  Other   TCM attempt successful?  No [Keshav, son]   Unsuccessful attempts  Attempt 3   Does the patient have a primary care provider?   Yes   Does the patient have an appointment with their PCP within 7 days of discharge?  Yes   Comments regarding PCP  hospital dc fu appt on 8/5/21 at 9:30 AM   Has the patient kept scheduled appointments due by today?  N/A          Autumn Hebert RN    7/30/2021, 09:39 EDT

## 2021-08-02 ENCOUNTER — APPOINTMENT (OUTPATIENT)
Dept: DIABETES SERVICES | Facility: HOSPITAL | Age: 61
End: 2021-08-02

## 2021-08-07 ENCOUNTER — READMISSION MANAGEMENT (OUTPATIENT)
Dept: CALL CENTER | Facility: HOSPITAL | Age: 61
End: 2021-08-07

## 2021-08-07 NOTE — OUTREACH NOTE
Medical Week 2 Survey      Responses   [facility] patient discharged from?  [place]   Does the patient have one of the following disease processes/diagnoses(primary or secondary)?  Other   Week 2 attempt successful?  No   Unsuccessful attempts  Attempt 1   Revoke  Decline to participate          Kerri Griffith RN

## 2021-08-10 RX ORDER — HYDROCHLOROTHIAZIDE 12.5 MG/1
CAPSULE, GELATIN COATED ORAL
Qty: 90 CAPSULE | Refills: 0 | OUTPATIENT
Start: 2021-08-10

## 2021-08-10 RX ORDER — AMLODIPINE BESYLATE 10 MG/1
TABLET ORAL
Qty: 90 TABLET | Refills: 0 | Status: SHIPPED | OUTPATIENT
Start: 2021-08-10 | End: 2021-10-25

## 2021-10-05 ENCOUNTER — DOCUMENTATION (OUTPATIENT)
Dept: GASTROENTEROLOGY | Facility: CLINIC | Age: 61
End: 2021-10-05

## 2021-10-05 ENCOUNTER — TELEPHONE (OUTPATIENT)
Dept: GASTROENTEROLOGY | Facility: CLINIC | Age: 61
End: 2021-10-05

## 2021-10-13 RX ORDER — MULTIVIT-MIN/FERROUS FUMARATE 15 MG
1 TABLET ORAL DAILY
COMMUNITY
Start: 2021-08-21 | End: 2023-03-16 | Stop reason: SDUPTHER

## 2021-10-15 ENCOUNTER — OFFICE VISIT (OUTPATIENT)
Dept: GASTROENTEROLOGY | Facility: CLINIC | Age: 61
End: 2021-10-15

## 2021-10-15 ENCOUNTER — LAB (OUTPATIENT)
Dept: LAB | Facility: HOSPITAL | Age: 61
End: 2021-10-15

## 2021-10-15 VITALS
HEIGHT: 68 IN | TEMPERATURE: 97.3 F | BODY MASS INDEX: 18.73 KG/M2 | HEART RATE: 85 BPM | SYSTOLIC BLOOD PRESSURE: 118 MMHG | WEIGHT: 123.6 LBS | DIASTOLIC BLOOD PRESSURE: 64 MMHG | OXYGEN SATURATION: 98 %

## 2021-10-15 DIAGNOSIS — K29.30 CHRONIC SUPERFICIAL GASTRITIS WITHOUT BLEEDING: ICD-10-CM

## 2021-10-15 DIAGNOSIS — K58.2 IRRITABLE BOWEL SYNDROME WITH BOTH CONSTIPATION AND DIARRHEA: ICD-10-CM

## 2021-10-15 DIAGNOSIS — E11.43 GASTROPARESIS DUE TO DM (HCC): Primary | ICD-10-CM

## 2021-10-15 DIAGNOSIS — E44.0 MODERATE PROTEIN-CALORIE MALNUTRITION (HCC): ICD-10-CM

## 2021-10-15 DIAGNOSIS — K31.84 GASTROPARESIS DUE TO DM (HCC): Primary | ICD-10-CM

## 2021-10-15 DIAGNOSIS — D64.9 ANEMIA, UNSPECIFIED TYPE: ICD-10-CM

## 2021-10-15 LAB
25(OH)D3 SERPL-MCNC: 21.9 NG/ML
ALBUMIN SERPL-MCNC: 4.4 G/DL (ref 3.5–5.2)
ALBUMIN/GLOB SERPL: 1.8 G/DL
ALP SERPL-CCNC: 89 U/L (ref 39–117)
ALT SERPL W P-5'-P-CCNC: 8 U/L (ref 1–33)
ANION GAP SERPL CALCULATED.3IONS-SCNC: 10.5 MMOL/L (ref 5–15)
AST SERPL-CCNC: 6 U/L (ref 1–32)
BASOPHILS # BLD AUTO: 0.01 10*3/MM3 (ref 0–0.2)
BASOPHILS NFR BLD AUTO: 0.2 % (ref 0–1.5)
BILIRUB SERPL-MCNC: 0.3 MG/DL (ref 0–1.2)
BUN SERPL-MCNC: 14 MG/DL (ref 8–23)
BUN/CREAT SERPL: 12.4 (ref 7–25)
CALCIUM SPEC-SCNC: 9.8 MG/DL (ref 8.6–10.5)
CHLORIDE SERPL-SCNC: 101 MMOL/L (ref 98–107)
CO2 SERPL-SCNC: 27.5 MMOL/L (ref 22–29)
CREAT SERPL-MCNC: 1.13 MG/DL (ref 0.57–1)
DEPRECATED RDW RBC AUTO: 40.8 FL (ref 37–54)
EOSINOPHIL # BLD AUTO: 0.05 10*3/MM3 (ref 0–0.4)
EOSINOPHIL NFR BLD AUTO: 1 % (ref 0.3–6.2)
ERYTHROCYTE [DISTWIDTH] IN BLOOD BY AUTOMATED COUNT: 12.7 % (ref 12.3–15.4)
FOLATE SERPL-MCNC: 13.7 NG/ML (ref 4.78–24.2)
GFR SERPL CREATININE-BSD FRML MDRD: 59 ML/MIN/1.73
GLOBULIN UR ELPH-MCNC: 2.4 GM/DL
GLUCOSE SERPL-MCNC: 325 MG/DL (ref 65–99)
HCT VFR BLD AUTO: 39.8 % (ref 34–46.6)
HGB BLD-MCNC: 13.7 G/DL (ref 12–15.9)
IMM GRANULOCYTES # BLD AUTO: 0.01 10*3/MM3 (ref 0–0.05)
IMM GRANULOCYTES NFR BLD AUTO: 0.2 % (ref 0–0.5)
IRON 24H UR-MRATE: 83 MCG/DL (ref 37–145)
IRON SATN MFR SERPL: 24 % (ref 20–50)
LYMPHOCYTES # BLD AUTO: 1.55 10*3/MM3 (ref 0.7–3.1)
LYMPHOCYTES NFR BLD AUTO: 30.5 % (ref 19.6–45.3)
MCH RBC QN AUTO: 30.4 PG (ref 26.6–33)
MCHC RBC AUTO-ENTMCNC: 34.4 G/DL (ref 31.5–35.7)
MCV RBC AUTO: 88.2 FL (ref 79–97)
MONOCYTES # BLD AUTO: 0.29 10*3/MM3 (ref 0.1–0.9)
MONOCYTES NFR BLD AUTO: 5.7 % (ref 5–12)
NEUTROPHILS NFR BLD AUTO: 3.18 10*3/MM3 (ref 1.7–7)
NEUTROPHILS NFR BLD AUTO: 62.4 % (ref 42.7–76)
NRBC BLD AUTO-RTO: 0 /100 WBC (ref 0–0.2)
PATHOLOGY REVIEW: YES
PLATELET # BLD AUTO: 285 10*3/MM3 (ref 140–450)
PMV BLD AUTO: 12 FL (ref 6–12)
POTASSIUM SERPL-SCNC: 4.5 MMOL/L (ref 3.5–5.2)
PROT SERPL-MCNC: 6.8 G/DL (ref 6–8.5)
RBC # BLD AUTO: 4.51 10*6/MM3 (ref 3.77–5.28)
SODIUM SERPL-SCNC: 139 MMOL/L (ref 136–145)
TIBC SERPL-MCNC: 340 MCG/DL (ref 298–536)
TRANSFERRIN SERPL-MCNC: 228 MG/DL (ref 200–360)
VIT B12 BLD-MCNC: 536 PG/ML (ref 211–946)
WBC # BLD AUTO: 5.09 10*3/MM3 (ref 3.4–10.8)

## 2021-10-15 PROCEDURE — 82607 VITAMIN B-12: CPT

## 2021-10-15 PROCEDURE — 83540 ASSAY OF IRON: CPT

## 2021-10-15 PROCEDURE — 99214 OFFICE O/P EST MOD 30 MIN: CPT | Performed by: NURSE PRACTITIONER

## 2021-10-15 PROCEDURE — 84466 ASSAY OF TRANSFERRIN: CPT

## 2021-10-15 PROCEDURE — 80053 COMPREHEN METABOLIC PANEL: CPT

## 2021-10-15 PROCEDURE — 36415 COLL VENOUS BLD VENIPUNCTURE: CPT

## 2021-10-15 PROCEDURE — 85025 COMPLETE CBC W/AUTO DIFF WBC: CPT

## 2021-10-15 PROCEDURE — 82306 VITAMIN D 25 HYDROXY: CPT

## 2021-10-15 PROCEDURE — 82746 ASSAY OF FOLIC ACID SERUM: CPT

## 2021-10-15 NOTE — PROGRESS NOTES
Follow Up      Patient Name: Thelma Michael  : 1960   MRN: [id number]     Chief Complaint:    Chief Complaint   Patient presents with   • Follow-up     gastroparesis        History of Present Illness: Thelma Michael is a 60 y.o. female who is here today for follow up on gastroparesis and anemia.    Since her last visit, Thelma underwent bidirectional endoscopy with PillCam.  Her EGD was significant for retained food in the duodenum and a tortuous esophagus.  Her colonoscopy revealed several colon polyps which were removed.  PillCam was no evidence of bleeding  10/15/21  Having nausea a few times a week- this typically happens when she needs to eat rather than after meals.  She does still have a poor appetite.  She has been taking megace but admits she does not take this consistently.  She has been taking ginger root with each meal and has not been having any more postprandial nausea or bloating.  She does drink diabetic nutritional supplement but only a few times a week.  Pantoprazole controls her GERD but cannot miss a dose or she has severe heratburn.    BMs are normal without any melena or BRB.      NM Gastric Emptying (2021 15:06)    Subjective      Review of Systems:   Review of Systems   Constitutional: Positive for appetite change. Negative for unexpected weight loss.   HENT: Negative for trouble swallowing.    Gastrointestinal: Positive for nausea and GERD. Negative for abdominal distention, abdominal pain, anal bleeding, blood in stool, constipation, diarrhea, rectal pain, vomiting and indigestion.       Medications:     Current Outpatient Medications:   •  acetaminophen (TYLENOL) 325 MG tablet, Take 2 tablets by mouth Every 4 (Four) Hours As Needed for Mild Pain ., Disp: , Rfl:   •  albuterol sulfate  (90 Base) MCG/ACT inhaler, Inhale 2 puffs Every 4 (Four) Hours As Needed for Wheezing., Disp: 18 g, Rfl: 5  •  amLODIPine (NORVASC) 10 MG tablet, Take 1 tablet by  mouth once daily, Disp: 90 tablet, Rfl: 0  •  Ascorbic Acid (VITAMIN C PO), Vitamin C TABS, Disp: , Rfl:   •  aspirin 81 MG EC tablet, Take 1 tablet by mouth Daily., Disp: , Rfl:   •  atorvastatin (LIPITOR) 80 MG tablet, TAKE 1 TABLET BY MOUTH ONCE DAILY AT NIGHT, Disp: 90 tablet, Rfl: 0  •  calcium carbonate (TUMS) 500 MG chewable tablet, Chew 1,000 mg 3 (Three) Times a Day As Needed for Indigestion or Heartburn., Disp: , Rfl:   •  Continuous Blood Gluc  (Dexcom G6 ) device, 1 kit Every 3 (Three) Months., Disp: 1 each, Rfl: 0  •  Continuous Blood Gluc Sensor (Dexcom G6 Sensor), Every 10 (Ten) Days., Disp: 9 each, Rfl: 3  •  Continuous Blood Gluc Transmit (Dexcom G6 Transmitter) misc, 1 kit Every 3 (Three) Months., Disp: 1 each, Rfl: 3  •  dicyclomine (Bentyl) 20 MG tablet, Take 1 tablet by mouth Every 6 (Six) Hours As Needed (diarrhea)., Disp: 60 tablet, Rfl: 5  •  donepezil (Aricept) 5 MG tablet, Take 1 tablet by mouth Every Night., Disp: 30 tablet, Rfl: 1  •  doxazosin (CARDURA) 1 MG tablet, TAKE 1 TABLET BY MOUTH ONCE DAILY AT NIGHT, Disp: 90 tablet, Rfl: 0  •  ferrous sulfate 325 (65 FE) MG tablet, Take 1 tablet by mouth Daily With Breakfast. Take with orange juice or vitamin C, Disp: 30 tablet, Rfl: 2  •  FLUoxetine (PROzac) 20 MG capsule, Take 1 capsule by mouth Daily., Disp: 30 capsule, Rfl: 1  •  gabapentin (NEURONTIN) 400 MG capsule, Take 1 capsule by mouth 3 (Three) Times a Day., Disp: 90 capsule, Rfl: 1  •  glucose blood (Glucose Meter Test) test strip, Use as instructed to check blood glucose levels 3 times daily, Disp: 100 each, Rfl: 5  •  Insulin Glargine (BASAGLAR KWIKPEN) 100 UNIT/ML injection pen, Inject 18 Units under the skin into the appropriate area as directed Every Night. (Patient taking differently: Inject 20 Units under the skin into the appropriate area as directed Every Night.), Disp: 5 pen, Rfl: 2  •  Insulin Lispro (ADMELOG SOLOSTAR) 100 UNIT/ML injection pen, Inject  0-14 units under skin into appropriate area based upon sliding scale 3 times daily with meals as needed. Do not exceed 40 units per day., Disp: 5 pen, Rfl: 5  •  Insulin Pen Needle (BD Pen Needle Cydney U/F) 32G X 4 MM misc, Take 1 each by mouth 4 (Four) Times a Day., Disp: 100 each, Rfl: 5  •  megestrol (MEGACE) 40 MG tablet, Take 1 tablet by mouth Daily., Disp: 30 tablet, Rfl: 2  •  melatonin 5 MG tablet tablet, Take 1 tablet by mouth At Night As Needed (sleep)., Disp: , Rfl:   •  Multiple Vitamins-Minerals (Multivitamin-Minerals) tablet, Take 1 tablet by mouth Daily., Disp: , Rfl:   •  multivitamin (Multiple Vitamin) tablet tablet, Take 1 tablet by mouth Daily., Disp: 30 tablet, Rfl: 11  •  ondansetron (ZOFRAN) 4 MG tablet, Take 1 tablet by mouth Every 6 (Six) Hours As Needed for Nausea or Vomiting., Disp: 15 tablet, Rfl: 0  •  pantoprazole (PROTONIX) 40 MG EC tablet, Take 1 tablet by mouth Daily., Disp: 30 tablet, Rfl: 5  •  sennosides-docusate (senna-docusate sodium) 8.6-50 MG per tablet, Take 2 tablets by mouth 2 (Two) Times a Day As Needed for Constipation., Disp: 60 tablet, Rfl: 5  •  traMADol (ULTRAM) 50 MG tablet, Take 1 tablet by mouth Every 6 (Six) Hours As Needed for Moderate Pain ., Disp: 28 tablet, Rfl: 0  •  traZODone (DESYREL) 50 MG tablet, TAKE ONE TO TWO TABLETS ONE HOUR BEFORE BEDTIME AS NEEDED FOR SLEEP, Disp: 45 tablet, Rfl: 5  •  vitamin D (ERGOCALCIFEROL) 1.25 MG (63378 UT) capsule capsule, Take 1 capsule by mouth once a week, Disp: 4 capsule, Rfl: 0  •  Wheat Dextrin (Benefiber Drink Mix) pack, Take 1 Scoop by mouth Daily., Disp: 30 each, Rfl: 5    Allergies:   No Known Allergies    Social History:   Social History     Socioeconomic History   • Marital status:    Tobacco Use   • Smoking status: Former Smoker     Quit date: 2019     Years since quittin.3   • Smokeless tobacco: Never Used   • Tobacco comment: BC PL never smoker    Vaping Use   • Vaping Use: Never used  "  Substance and Sexual Activity   • Alcohol use: Yes     Alcohol/week: 1.0 standard drink     Types: 1 Glasses of wine per week     Comment: ocassional   • Drug use: No   • Sexual activity: Defer     Comment: Single         Surgical History:   Past Surgical History:   Procedure Laterality Date   • CAPSULE ENDOSCOPY  7/27/2021    Procedure: PILLCAM DEPLOYMENT;  Surgeon: Mikael Worthy MD;  Location:  [facility];  Service: Gastroenterology;;   • COLONOSCOPY     • COLONOSCOPY N/A 7/27/2021    Procedure: COLONOSCOPY;  Surgeon: Mikael Worthy MD;  Location:  [facility];  Service: Gastroenterology;  Laterality: N/A;   • DENTAL PROCEDURE     • ENDOSCOPY N/A 6/30/2021    Procedure: ESOPHAGOGASTRODUODENOSCOPY;  Surgeon: Brunner, Mark I, MD;  Location:  [facility];  Service: Gastroenterology;  Laterality: N/A;   • ENDOSCOPY N/A 7/27/2021    Procedure: ESOPHAGOGASTRODUODENOSCOPY;  Surgeon: Mikael Worthy MD;  Location:  [facility];  Service: Gastroenterology;  Laterality: N/A;   • NO PAST SURGERIES          Medical History:   Past Medical History:   Diagnosis Date   • Acid reflux    • Acute bronchitis    • Cardiac murmur    • Diabetes mellitus (HCC)    • H/O echocardiogram 08/07/2012    i. LVEF 65%.ii. Mild LVH.iii. Borderline evidence of atrial septal aneurysm.  No PFO.    • History of nuclear stress test 08/22/2014    Negative for ischemia and scars; LVEF 77%.     • Hyperlipidemia    • Hypertension    • Impacted cerumen of both ears    • Migraine    • Self-catheterizes urinary bladder    • Sinusitis    • Stroke (HCC)    • Tobacco abuse     quit 4 days ago.     • Urticaria         Objective     Physical Exam:  Vital Signs:   Vitals:    10/15/21 1026   BP: 118/64   BP Location: Left arm   Patient Position: Sitting   Cuff Size: Adult   Pulse: 85   Temp: 97.3 °F (36.3 °C)   TempSrc: Temporal   SpO2: 98%   Weight: 56.1 kg (123 lb 9.6 oz)   Height: 172.7 cm (67.99\")     Body mass index is 18.8 " kg/m².     Physical Exam  Vitals and nursing note reviewed.   Constitutional:       General: She is not in acute distress.     Appearance: She is well-developed.   Cardiovascular:      Rate and Rhythm: Normal rate and regular rhythm.   Pulmonary:      Effort: Pulmonary effort is normal. No accessory muscle usage or respiratory distress.      Breath sounds: Normal breath sounds.   Abdominal:      General: Abdomen is flat. Bowel sounds are normal. There is no distension.      Palpations: Abdomen is soft.   Musculoskeletal:      Right lower leg: No edema.      Left lower leg: No edema.   Skin:     Coloration: Skin is not jaundiced or pale.      Findings: No erythema.   Neurological:      Mental Status: She is alert and oriented to person, place, and time.   Psychiatric:         Speech: Speech normal.         Behavior: Behavior normal.         Thought Content: Thought content normal.         Judgment: Judgment normal.     Reviewed most recent EGD, colonoscopy, capsule study    Assessment / Plan      Assessment/Plan:   Diagnoses and all orders for this visit:    1. Gastroparesis due to DM (HCC) (Primary)  Improved on gingerroot, return to endocrinology for management of diabetes  2. Chronic superficial gastritis without bleeding  Continue ppi  3. Irritable bowel syndrome with both constipation and diarrhea  Currently having normal BMs  4. Anemia, unspecified type  Labs today and will refer to hematology  5. Moderate protein-calorie malnutrition (HCC)  -     CBC & Differential; Future  -     Comprehensive Metabolic Panel; Future  -     Iron Profile; Future  -     Peripheral Blood Smear; Future  -     Vitamin B12; Future  -     Folate; Future  -     Vitamin D 25 Hydroxy; Future  Encouraged continue with Megace more consistently and recommend taking Glucerna supplements at least once daily.  Ideally drink Glucerna twice daily.         Follow Up:   Return in about 4 months (around 2/15/2022).    Plan of care reviewed with  the patient at the conclusion of today's visit.  Education was provided regarding diagnosis, management, and any prescribed or recommended OTC medications.  Patient verbalized understanding of and agreement with management plan.         HOWIE Samuels  [facility]

## 2021-10-16 LAB
LAB AP CASE REPORT: NORMAL
PATH REPORT.FINAL DX SPEC: NORMAL

## 2021-10-19 DIAGNOSIS — M19.90 OSTEOARTHRITIS, UNSPECIFIED OSTEOARTHRITIS TYPE, UNSPECIFIED SITE: ICD-10-CM

## 2021-10-19 DIAGNOSIS — M25.50 GENERALIZED JOINT PAIN: ICD-10-CM

## 2021-10-19 RX ORDER — TRAMADOL HYDROCHLORIDE 50 MG/1
50 TABLET ORAL EVERY 6 HOURS PRN
Qty: 28 TABLET | Refills: 0 | OUTPATIENT
Start: 2021-10-19

## 2021-10-19 NOTE — TELEPHONE ENCOUNTER
Caller: Thelma Michael    Relationship: Self      Medication requested (name and dosage):     Requested Prescriptions:   Requested Prescriptions     Pending Prescriptions Disp Refills   • traMADol (ULTRAM) 50 MG tablet 28 tablet 0     Sig: Take 1 tablet by mouth Every 6 (Six) Hours As Needed for Moderate Pain .        Pharmacy where request should be sent:[facility] [address] [place] [address] [phone]  - [phone]   [phone]    Additional details provided by patient: PATIENT IS WANTING TO PICK THIS UP TODAY    Best call back number: [phone]    Does the patient have less than a 3 day supply:  [x] Yes  [] No    Nicol Guzman Rep   10/19/21 12:46 EDT

## 2021-10-22 DIAGNOSIS — M25.50 GENERALIZED JOINT PAIN: ICD-10-CM

## 2021-10-22 DIAGNOSIS — M19.90 OSTEOARTHRITIS, UNSPECIFIED OSTEOARTHRITIS TYPE, UNSPECIFIED SITE: ICD-10-CM

## 2021-10-22 DIAGNOSIS — F41.8 DEPRESSION WITH ANXIETY: ICD-10-CM

## 2021-10-25 RX ORDER — AMLODIPINE BESYLATE 10 MG/1
TABLET ORAL
Qty: 90 TABLET | Refills: 0 | Status: SHIPPED | OUTPATIENT
Start: 2021-10-25 | End: 2022-01-28 | Stop reason: SDUPTHER

## 2021-10-25 RX ORDER — FLUOXETINE HYDROCHLORIDE 20 MG/1
CAPSULE ORAL
Qty: 90 CAPSULE | Refills: 0 | Status: SHIPPED | OUTPATIENT
Start: 2021-10-25 | End: 2022-01-28 | Stop reason: SDUPTHER

## 2021-10-25 RX ORDER — TRAMADOL HYDROCHLORIDE 50 MG/1
TABLET ORAL
Qty: 28 TABLET | Refills: 0 | Status: SHIPPED | OUTPATIENT
Start: 2021-10-25 | End: 2022-01-28 | Stop reason: SDUPTHER

## 2022-01-03 ENCOUNTER — TELEPHONE (OUTPATIENT)
Dept: INTERNAL MEDICINE | Facility: CLINIC | Age: 62
End: 2022-01-03

## 2022-01-03 ENCOUNTER — TELEPHONE (OUTPATIENT)
Dept: ENDOCRINOLOGY | Facility: CLINIC | Age: 62
End: 2022-01-03

## 2022-01-03 NOTE — TELEPHONE ENCOUNTER
PATIENT NEEDS SHINGLES, PNEUMONIA AND COVID BOOSTER.  CAN SHE GET THEM ALL AT THE SAME TIME?    PLEASE CALL [phone]

## 2022-01-03 NOTE — TELEPHONE ENCOUNTER
Pt advised she would need to get her shingles vaccine at the pharmacy, but could get the other 2 here at the office if she prefers.

## 2022-01-05 ENCOUNTER — OFFICE VISIT (OUTPATIENT)
Dept: ENDOCRINOLOGY | Facility: CLINIC | Age: 62
End: 2022-01-05

## 2022-01-05 VITALS
SYSTOLIC BLOOD PRESSURE: 118 MMHG | DIASTOLIC BLOOD PRESSURE: 64 MMHG | BODY MASS INDEX: 18.79 KG/M2 | HEART RATE: 86 BPM | HEIGHT: 68 IN | OXYGEN SATURATION: 98 % | WEIGHT: 124 LBS

## 2022-01-05 DIAGNOSIS — E10.65 UNCONTROLLED TYPE 1 DIABETES MELLITUS WITH HYPERGLYCEMIA: Primary | ICD-10-CM

## 2022-01-05 LAB
EXPIRATION DATE: ABNORMAL
EXPIRATION DATE: ABNORMAL
GLUCOSE BLDC GLUCOMTR-MCNC: 404 MG/DL (ref 70–130)
HBA1C MFR BLD: 14.4 %
Lab: ABNORMAL
Lab: ABNORMAL

## 2022-01-05 PROCEDURE — 82947 ASSAY GLUCOSE BLOOD QUANT: CPT | Performed by: INTERNAL MEDICINE

## 2022-01-05 PROCEDURE — 83036 HEMOGLOBIN GLYCOSYLATED A1C: CPT | Performed by: INTERNAL MEDICINE

## 2022-01-05 PROCEDURE — 3046F HEMOGLOBIN A1C LEVEL >9.0%: CPT | Performed by: INTERNAL MEDICINE

## 2022-01-05 PROCEDURE — 99214 OFFICE O/P EST MOD 30 MIN: CPT | Performed by: INTERNAL MEDICINE

## 2022-01-05 RX ORDER — PROCHLORPERAZINE 25 MG/1
SUPPOSITORY RECTAL
Qty: 9 EACH | Refills: 3 | Status: ON HOLD | OUTPATIENT
Start: 2022-01-05 | End: 2022-12-12

## 2022-01-05 RX ORDER — PROCHLORPERAZINE 25 MG/1
1 SUPPOSITORY RECTAL
Qty: 1 EACH | Refills: 3 | Status: ON HOLD | OUTPATIENT
Start: 2022-01-05 | End: 2022-12-12

## 2022-01-05 RX ORDER — INSULIN LISPRO 100 U/ML
INJECTION, SOLUTION SUBCUTANEOUS
Qty: 15 ML | Refills: 3 | Status: SHIPPED | OUTPATIENT
Start: 2022-01-05 | End: 2022-01-12 | Stop reason: CLARIF

## 2022-01-05 RX ORDER — INSULIN GLARGINE 100 [IU]/ML
30 INJECTION, SOLUTION SUBCUTANEOUS NIGHTLY
Qty: 15 ML | Refills: 3 | Status: ON HOLD | OUTPATIENT
Start: 2022-01-05 | End: 2022-01-09 | Stop reason: SDUPTHER

## 2022-01-05 NOTE — PROGRESS NOTES
"     Office Note      Date: 2022  Patient Name: Thelma Michael  MRN: [id number]  : 1960    Chief Complaint   Patient presents with   • Uncontrolled type 1 diabetes mellitus with hyperglycemia (CM   • pt needs assistance with her monitor       History of Present Illness:   Thelma Michael is a 61 y.o. female who presents for Diabetes - type 1  She has a dexcom  She had a tandem pump but has not yet hooked it up because she has not been trained on it  She has been taking shots but \"not doing what I should be doing\"  Not eating right or exercising . Missing some shots.  She is taking 20 units of basaglar       Last A1c:  Hemoglobin A1C   Date Value Ref Range Status   2022 13.40 (H) 4.80 - 5.60 % Final       Changes in health since last visit: hospitalized over the summer for her stomach she thinks . Last eye exam we did retinal photo last time .    Subjective        Review of Systems:   Review of Systems   Constitutional: Negative.    HENT: Negative.    Eyes: Negative.    Respiratory: Negative.        The following portions of the patient's history were reviewed and updated as appropriate: allergies, current medications, past family history, past medical history, past social history, past surgical history and problem list.    Objective     Visit Vitals  /64 (BP Location: Left arm, Patient Position: Sitting, Cuff Size: Adult)   Pulse 86   Ht 172.7 cm (67.99\")   Wt 56.2 kg (124 lb)   SpO2 98%   BMI 18.86 kg/m²       Labs:    CMP  Lab Results   Component Value Date    GLUCOSE 145 (H) 2022    BUN 18 2022    CREATININE 0.86 2022    EGFRIFNONA 61 2015    EGFRIFAFRI 81 2022    BCR 20.9 2022    K 4.4 2022    CO2 27.0 2022    CALCIUM 8.7 2022    PROTENTOTREF 7.6 2015    LABIL2 1.6 2015    AST 14 2022    ALT 9 2022        CBC w/DIFF  Lab Results   Component Value Date    WBC 5.27 2022    RBC 3.57 (L) " 01/09/2022    HGB 10.9 (L) 01/09/2022    HCT 31.9 (L) 01/09/2022    MCV 89.4 01/09/2022    MCH 30.5 01/09/2022    MCHC 34.2 01/09/2022    RDW 12.3 01/09/2022    RDWSD 40.4 01/09/2022    MPV 10.9 01/09/2022     01/09/2022    NEUTRORELPCT 58.4 01/08/2022    LYMPHORELPCT 34.1 01/08/2022    MONORELPCT 5.8 01/08/2022    EOSRELPCT 1.1 01/08/2022    BASORELPCT 0.3 01/08/2022    AUTOIGPER 0.3 01/08/2022    NEUTROABS 3.72 01/08/2022    LYMPHSABS 2.17 01/08/2022    MONOSABS 0.37 01/08/2022    EOSABS 0.07 01/08/2022    BASOSABS 0.02 01/08/2022    AUTOIGNUM 0.02 01/08/2022    NRBC 0.0 01/08/2022       Physical Exam:  Physical Exam  Vitals reviewed.   Constitutional:       Appearance: Normal appearance.   Psychiatric:         Mood and Affect: Mood normal.         Thought Content: Thought content normal.         Judgment: Judgment normal.          Assessment / Plan      Assessment & Plan:  Problem List Items Addressed This Visit        Other    Uncontrolled type 1 diabetes mellitus with hyperglycemia (HCC) - Primary    Overview     dx'd age 38 . Has had dka         Current Assessment & Plan      Deteriorated with a1c >14.   I will contact tandem and have them arrange to teach her how to use her pump.  As for adjusting her insulin : she is clearly not getting enough insulin.  I will have her takd 30 units of basaglar daily and 15 units of admelog at each meal          Relevant Medications    Continuous Blood Gluc  (Dexcom G6 ) device    Continuous Blood Gluc Sensor (Dexcom G6 Sensor)    Continuous Blood Gluc Transmit (Dexcom G6 Transmitter) misc    Insulin Lispro (ADMELOG SOLOSTAR) 100 UNIT/ML injection pen    Insulin Glargine (BASAGLAR KWIKPEN) 100 UNIT/ML injection pen    Other Relevant Orders    POC Glucose, Blood (Completed)    POC Glycosylated Hemoglobin (Hb A1C) (Completed)           Gerson Ochoa MD   01/05/2022

## 2022-01-05 NOTE — ASSESSMENT & PLAN NOTE
Deteriorated with a1c >14.   I will contact [employer] and have them arrange to teach her how to use her pump.  As for adjusting her insulin : she is clearly not getting enough insulin.  I will have her takd 30 units of basaglar daily and 15 units of admelog at each meal

## 2022-01-08 ENCOUNTER — HOSPITAL ENCOUNTER (OUTPATIENT)
Facility: HOSPITAL | Age: 62
Setting detail: OBSERVATION
LOS: 1 days | Discharge: HOME | End: 2022-01-09
Attending: EMERGENCY MEDICINE | Admitting: INTERNAL MEDICINE

## 2022-01-08 ENCOUNTER — APPOINTMENT (OUTPATIENT)
Dept: GENERAL RADIOLOGY | Facility: HOSPITAL | Age: 62
End: 2022-01-08

## 2022-01-08 DIAGNOSIS — E11.65 TYPE 2 DIABETES MELLITUS WITH HYPERGLYCEMIA, WITH LONG-TERM CURRENT USE OF INSULIN: ICD-10-CM

## 2022-01-08 DIAGNOSIS — R53.83 LETHARGY: ICD-10-CM

## 2022-01-08 DIAGNOSIS — Z79.4 TYPE 2 DIABETES MELLITUS WITH HYPERGLYCEMIA, WITH LONG-TERM CURRENT USE OF INSULIN: ICD-10-CM

## 2022-01-08 DIAGNOSIS — E10.649 HYPOGLYCEMIA DUE TO TYPE 1 DIABETES MELLITUS: Primary | ICD-10-CM

## 2022-01-08 PROBLEM — E87.6 HYPOKALEMIA: Status: ACTIVE | Noted: 2022-01-08

## 2022-01-08 LAB
ALBUMIN SERPL-MCNC: 4.2 G/DL (ref 3.5–5.2)
ALBUMIN/GLOB SERPL: 1.6 G/DL
ALP SERPL-CCNC: 85 U/L (ref 39–117)
ALT SERPL W P-5'-P-CCNC: 9 U/L (ref 1–33)
ANION GAP SERPL CALCULATED.3IONS-SCNC: 11 MMOL/L (ref 5–15)
AST SERPL-CCNC: 14 U/L (ref 1–32)
BASOPHILS # BLD AUTO: 0.02 10*3/MM3 (ref 0–0.2)
BASOPHILS NFR BLD AUTO: 0.3 % (ref 0–1.5)
BILIRUB SERPL-MCNC: 0.2 MG/DL (ref 0–1.2)
BUN SERPL-MCNC: 21 MG/DL (ref 8–23)
BUN/CREAT SERPL: 20.6 (ref 7–25)
CALCIUM SPEC-SCNC: 9.5 MG/DL (ref 8.6–10.5)
CHLORIDE SERPL-SCNC: 104 MMOL/L (ref 98–107)
CO2 SERPL-SCNC: 29 MMOL/L (ref 22–29)
CORTIS SERPL-MCNC: 31.42 MCG/DL
CREAT SERPL-MCNC: 1.02 MG/DL (ref 0.57–1)
DEPRECATED RDW RBC AUTO: 41.1 FL (ref 37–54)
EOSINOPHIL # BLD AUTO: 0.07 10*3/MM3 (ref 0–0.4)
EOSINOPHIL NFR BLD AUTO: 1.1 % (ref 0.3–6.2)
ERYTHROCYTE [DISTWIDTH] IN BLOOD BY AUTOMATED COUNT: 12.6 % (ref 12.3–15.4)
FLUAV RNA RESP QL NAA+PROBE: NOT DETECTED
FLUBV RNA RESP QL NAA+PROBE: NOT DETECTED
GFR SERPL CREATININE-BSD FRML MDRD: 66 ML/MIN/1.73
GLOBULIN UR ELPH-MCNC: 2.7 GM/DL
GLUCOSE BLDC GLUCOMTR-MCNC: 142 MG/DL (ref 70–130)
GLUCOSE BLDC GLUCOMTR-MCNC: 226 MG/DL (ref 70–130)
GLUCOSE BLDC GLUCOMTR-MCNC: 230 MG/DL (ref 70–130)
GLUCOSE BLDC GLUCOMTR-MCNC: 244 MG/DL (ref 70–130)
GLUCOSE BLDC GLUCOMTR-MCNC: 46 MG/DL (ref 70–130)
GLUCOSE SERPL-MCNC: 42 MG/DL (ref 65–99)
HCT VFR BLD AUTO: 41 % (ref 34–46.6)
HGB BLD-MCNC: 14 G/DL (ref 12–15.9)
HOLD SPECIMEN: NORMAL
IMM GRANULOCYTES # BLD AUTO: 0.02 10*3/MM3 (ref 0–0.05)
IMM GRANULOCYTES NFR BLD AUTO: 0.3 % (ref 0–0.5)
LYMPHOCYTES # BLD AUTO: 2.17 10*3/MM3 (ref 0.7–3.1)
LYMPHOCYTES NFR BLD AUTO: 34.1 % (ref 19.6–45.3)
MAGNESIUM SERPL-MCNC: 2.4 MG/DL (ref 1.6–2.4)
MCH RBC QN AUTO: 30.6 PG (ref 26.6–33)
MCHC RBC AUTO-ENTMCNC: 34.1 G/DL (ref 31.5–35.7)
MCV RBC AUTO: 89.7 FL (ref 79–97)
MONOCYTES # BLD AUTO: 0.37 10*3/MM3 (ref 0.1–0.9)
MONOCYTES NFR BLD AUTO: 5.8 % (ref 5–12)
NEUTROPHILS NFR BLD AUTO: 3.72 10*3/MM3 (ref 1.7–7)
NEUTROPHILS NFR BLD AUTO: 58.4 % (ref 42.7–76)
NRBC BLD AUTO-RTO: 0 /100 WBC (ref 0–0.2)
PLATELET # BLD AUTO: 293 10*3/MM3 (ref 140–450)
PMV BLD AUTO: 10.6 FL (ref 6–12)
POTASSIUM SERPL-SCNC: 2.9 MMOL/L (ref 3.5–5.2)
PROT SERPL-MCNC: 6.9 G/DL (ref 6–8.5)
RBC # BLD AUTO: 4.57 10*6/MM3 (ref 3.77–5.28)
RSV RNA NPH QL NAA+NON-PROBE: NOT DETECTED
SARS-COV-2 RNA RESP QL NAA+PROBE: NOT DETECTED
SODIUM SERPL-SCNC: 144 MMOL/L (ref 136–145)
TROPONIN T SERPL-MCNC: <0.01 NG/ML (ref 0–0.03)
WBC NRBC COR # BLD: 6.37 10*3/MM3 (ref 3.4–10.8)
WHOLE BLOOD HOLD SPECIMEN: NORMAL
WHOLE BLOOD HOLD SPECIMEN: NORMAL

## 2022-01-08 PROCEDURE — 82962 GLUCOSE BLOOD TEST: CPT

## 2022-01-08 PROCEDURE — G0378 HOSPITAL OBSERVATION PER HR: HCPCS

## 2022-01-08 PROCEDURE — 96374 THER/PROPH/DIAG INJ IV PUSH: CPT

## 2022-01-08 PROCEDURE — 85025 COMPLETE CBC W/AUTO DIFF WBC: CPT | Performed by: EMERGENCY MEDICINE

## 2022-01-08 PROCEDURE — 80053 COMPREHEN METABOLIC PANEL: CPT | Performed by: EMERGENCY MEDICINE

## 2022-01-08 PROCEDURE — 99285 EMERGENCY DEPT VISIT HI MDM: CPT

## 2022-01-08 PROCEDURE — 96365 THER/PROPH/DIAG IV INF INIT: CPT

## 2022-01-08 PROCEDURE — 93005 ELECTROCARDIOGRAM TRACING: CPT | Performed by: EMERGENCY MEDICINE

## 2022-01-08 PROCEDURE — 82533 TOTAL CORTISOL: CPT | Performed by: INTERNAL MEDICINE

## 2022-01-08 PROCEDURE — 83735 ASSAY OF MAGNESIUM: CPT | Performed by: PHYSICIAN ASSISTANT

## 2022-01-08 PROCEDURE — 87637 SARSCOV2&INF A&B&RSV AMP PRB: CPT | Performed by: INTERNAL MEDICINE

## 2022-01-08 PROCEDURE — C9803 HOPD COVID-19 SPEC COLLECT: HCPCS | Performed by: INTERNAL MEDICINE

## 2022-01-08 PROCEDURE — 99220 PR INITIAL OBSERVATION CARE/DAY 70 MINUTES: CPT | Performed by: INTERNAL MEDICINE

## 2022-01-08 PROCEDURE — 84484 ASSAY OF TROPONIN QUANT: CPT | Performed by: EMERGENCY MEDICINE

## 2022-01-08 PROCEDURE — 71045 X-RAY EXAM CHEST 1 VIEW: CPT

## 2022-01-08 RX ORDER — DEXTROSE MONOHYDRATE 25 G/50ML
INJECTION, SOLUTION INTRAVENOUS
Status: COMPLETED
Start: 2022-01-08 | End: 2022-01-08

## 2022-01-08 RX ORDER — POTASSIUM CHLORIDE 1.5 G/1.77G
40 POWDER, FOR SOLUTION ORAL AS NEEDED
Status: DISCONTINUED | OUTPATIENT
Start: 2022-01-08 | End: 2022-01-09 | Stop reason: HOSPADM

## 2022-01-08 RX ORDER — TRAZODONE HYDROCHLORIDE 50 MG/1
50 TABLET ORAL NIGHTLY PRN
Status: DISCONTINUED | OUTPATIENT
Start: 2022-01-08 | End: 2022-01-09 | Stop reason: HOSPADM

## 2022-01-08 RX ORDER — SODIUM CHLORIDE 0.9 % (FLUSH) 0.9 %
10 SYRINGE (ML) INJECTION AS NEEDED
Status: DISCONTINUED | OUTPATIENT
Start: 2022-01-08 | End: 2022-01-09 | Stop reason: HOSPADM

## 2022-01-08 RX ORDER — DEXTROSE, SODIUM CHLORIDE, AND POTASSIUM CHLORIDE 5; .45; .3 G/100ML; G/100ML; G/100ML
100 INJECTION INTRAVENOUS CONTINUOUS
Status: DISPENSED | OUTPATIENT
Start: 2022-01-08 | End: 2022-01-09

## 2022-01-08 RX ORDER — ALBUTEROL SULFATE 2.5 MG/3ML
2.5 SOLUTION RESPIRATORY (INHALATION) EVERY 4 HOURS PRN
Status: DISCONTINUED | OUTPATIENT
Start: 2022-01-08 | End: 2022-01-09 | Stop reason: HOSPADM

## 2022-01-08 RX ORDER — ASPIRIN 81 MG/1
81 TABLET ORAL DAILY
Status: DISCONTINUED | OUTPATIENT
Start: 2022-01-09 | End: 2022-01-09 | Stop reason: HOSPADM

## 2022-01-08 RX ORDER — POTASSIUM CHLORIDE 750 MG/1
40 CAPSULE, EXTENDED RELEASE ORAL AS NEEDED
Status: DISCONTINUED | OUTPATIENT
Start: 2022-01-08 | End: 2022-01-09 | Stop reason: HOSPADM

## 2022-01-08 RX ORDER — DEXTROSE MONOHYDRATE 25 G/50ML
25 INJECTION, SOLUTION INTRAVENOUS ONCE
Status: COMPLETED | OUTPATIENT
Start: 2022-01-08 | End: 2022-01-08

## 2022-01-08 RX ORDER — TERAZOSIN 1 MG/1
1 CAPSULE ORAL NIGHTLY
Status: DISCONTINUED | OUTPATIENT
Start: 2022-01-09 | End: 2022-01-09 | Stop reason: HOSPADM

## 2022-01-08 RX ORDER — POTASSIUM CHLORIDE 7.45 MG/ML
10 INJECTION INTRAVENOUS
Status: DISCONTINUED | OUTPATIENT
Start: 2022-01-08 | End: 2022-01-09 | Stop reason: HOSPADM

## 2022-01-08 RX ORDER — DONEPEZIL HYDROCHLORIDE 5 MG/1
5 TABLET, FILM COATED ORAL NIGHTLY
Status: DISCONTINUED | OUTPATIENT
Start: 2022-01-09 | End: 2022-01-09 | Stop reason: HOSPADM

## 2022-01-08 RX ORDER — PANTOPRAZOLE SODIUM 40 MG/1
40 TABLET, DELAYED RELEASE ORAL DAILY
Status: DISCONTINUED | OUTPATIENT
Start: 2022-01-09 | End: 2022-01-09 | Stop reason: HOSPADM

## 2022-01-08 RX ORDER — DEXTROSE MONOHYDRATE 100 MG/ML
50 INJECTION, SOLUTION INTRAVENOUS CONTINUOUS
Status: DISCONTINUED | OUTPATIENT
Start: 2022-01-08 | End: 2022-01-08

## 2022-01-08 RX ORDER — DEXTROSE MONOHYDRATE 25 G/50ML
25 INJECTION, SOLUTION INTRAVENOUS
Status: DISCONTINUED | OUTPATIENT
Start: 2022-01-08 | End: 2022-01-09 | Stop reason: HOSPADM

## 2022-01-08 RX ORDER — ONDANSETRON 2 MG/ML
4 INJECTION INTRAMUSCULAR; INTRAVENOUS EVERY 6 HOURS PRN
Status: DISCONTINUED | OUTPATIENT
Start: 2022-01-08 | End: 2022-01-09 | Stop reason: HOSPADM

## 2022-01-08 RX ORDER — CHOLECALCIFEROL (VITAMIN D3) 125 MCG
5 CAPSULE ORAL NIGHTLY PRN
Status: DISCONTINUED | OUTPATIENT
Start: 2022-01-08 | End: 2022-01-09 | Stop reason: HOSPADM

## 2022-01-08 RX ORDER — MEGESTROL ACETATE 40 MG/1
40 TABLET ORAL DAILY
Status: DISCONTINUED | OUTPATIENT
Start: 2022-01-09 | End: 2022-01-09 | Stop reason: HOSPADM

## 2022-01-08 RX ORDER — ACETAMINOPHEN 325 MG/1
650 TABLET ORAL EVERY 4 HOURS PRN
Status: DISCONTINUED | OUTPATIENT
Start: 2022-01-08 | End: 2022-01-09 | Stop reason: HOSPADM

## 2022-01-08 RX ORDER — SODIUM CHLORIDE 0.9 % (FLUSH) 0.9 %
10 SYRINGE (ML) INJECTION EVERY 12 HOURS SCHEDULED
Status: DISCONTINUED | OUTPATIENT
Start: 2022-01-09 | End: 2022-01-09 | Stop reason: HOSPADM

## 2022-01-08 RX ORDER — AMLODIPINE BESYLATE 10 MG/1
10 TABLET ORAL DAILY
Status: DISCONTINUED | OUTPATIENT
Start: 2022-01-09 | End: 2022-01-09 | Stop reason: HOSPADM

## 2022-01-08 RX ORDER — GABAPENTIN 400 MG/1
400 CAPSULE ORAL 3 TIMES DAILY
Status: DISCONTINUED | OUTPATIENT
Start: 2022-01-09 | End: 2022-01-09 | Stop reason: HOSPADM

## 2022-01-08 RX ORDER — FLUOXETINE HYDROCHLORIDE 20 MG/1
20 CAPSULE ORAL DAILY
Status: DISCONTINUED | OUTPATIENT
Start: 2022-01-09 | End: 2022-01-09 | Stop reason: HOSPADM

## 2022-01-08 RX ORDER — ATORVASTATIN CALCIUM 40 MG/1
80 TABLET, FILM COATED ORAL NIGHTLY
Status: DISCONTINUED | OUTPATIENT
Start: 2022-01-09 | End: 2022-01-09 | Stop reason: HOSPADM

## 2022-01-08 RX ORDER — NICOTINE POLACRILEX 4 MG
15 LOZENGE BUCCAL
Status: DISCONTINUED | OUTPATIENT
Start: 2022-01-08 | End: 2022-01-09 | Stop reason: HOSPADM

## 2022-01-08 RX ADMIN — Medication 5 MG: at 23:50

## 2022-01-08 RX ADMIN — DEXTROSE MONOHYDRATE 25 G: 25 INJECTION, SOLUTION INTRAVENOUS at 19:35

## 2022-01-08 RX ADMIN — POTASSIUM CHLORIDE 40 MEQ: 750 CAPSULE, EXTENDED RELEASE ORAL at 23:50

## 2022-01-08 RX ADMIN — DONEPEZIL HYDROCHLORIDE 5 MG: 5 TABLET, FILM COATED ORAL at 23:50

## 2022-01-08 RX ADMIN — GABAPENTIN 400 MG: 400 CAPSULE ORAL at 23:50

## 2022-01-08 RX ADMIN — POTASSIUM CHLORIDE, DEXTROSE MONOHYDRATE AND SODIUM CHLORIDE 100 ML/HR: 300; 5; 450 INJECTION, SOLUTION INTRAVENOUS at 23:35

## 2022-01-08 RX ADMIN — ATORVASTATIN CALCIUM 80 MG: 40 TABLET, FILM COATED ORAL at 23:50

## 2022-01-08 RX ADMIN — DEXTROSE MONOHYDRATE 50 ML/HR: 100 INJECTION, SOLUTION INTRAVENOUS at 20:14

## 2022-01-08 NOTE — ED PROVIDER NOTES
Subjective   Mrs. Michael presents via EMS with lethargy.  Family called EMS for lethargy.  EMS reports initial blood sugar 47.  They gave an amp of D50 and she seemed to come around a little bit but has not returned to an alert status.  She denies any recent illnesses.  She denies hurting anywhere.  She tells me that Dr. Ochoa increased her insulin a couple of days ago.  She denies pain in her chest but does tell me she felt short of breath earlier in the evening.      History provided by:  Patient, medical records and EMS personnel  Hypoglycemia  Initial blood sugar:  47  Blood sugar after intervention:  300  Severity:  Moderate  Onset quality:  Gradual  Timing:  Constant  Progression:  Improving  Chronicity:  New  Diabetic status:  Controlled with insulin  Current diabetic therapy:  Basaglar insulin at night. This was increased from 30 to 40 units nightly several days ago. Her short acting insulin was increased from sliding scale up to 15 units 3 times a day as well  Context comment:  Increased insulin dose  Relieved by:  Nothing  Associated symptoms: altered mental status, dizziness, shortness of breath and sweats    Associated symptoms: no vomiting        Review of Systems   Constitutional: Positive for diaphoresis. Negative for fever.   HENT: Negative for congestion and rhinorrhea.    Respiratory: Positive for shortness of breath. Negative for cough.    Gastrointestinal: Negative for vomiting.   Neurological: Positive for dizziness.   Psychiatric/Behavioral: Positive for confusion.   All other systems reviewed and are negative.      Past Medical History:   Diagnosis Date   • Acid reflux    • Acute bronchitis    • Cardiac murmur    • Diabetes mellitus (HCC)    • H/O echocardiogram 08/07/2012    i. LVEF 65%.ii. Mild LVH.iii. Borderline evidence of atrial septal aneurysm.  No PFO.    • History of nuclear stress test 08/22/2014    Negative for ischemia and scars; LVEF 77%.     • Hyperlipidemia    • Hypertension     • Impacted cerumen of both ears    • Migraine    • Self-catheterizes urinary bladder    • Sinusitis    • Stroke (HCC)    • Tobacco abuse     quit 4 days ago.     • Urticaria        No Known Allergies    Past Surgical History:   Procedure Laterality Date   • CAPSULE ENDOSCOPY  2021    Procedure: PILLCAM DEPLOYMENT;  Surgeon: Mikael Worthy MD;  Location:  [facility];  Service: Gastroenterology;;   • COLONOSCOPY     • COLONOSCOPY N/A 2021    Procedure: COLONOSCOPY;  Surgeon: Mikael Worthy MD;  Location:  [facility];  Service: Gastroenterology;  Laterality: N/A;   • DENTAL PROCEDURE     • ENDOSCOPY N/A 2021    Procedure: ESOPHAGOGASTRODUODENOSCOPY;  Surgeon: Brunner, Mark I, MD;  Location:  [facility];  Service: Gastroenterology;  Laterality: N/A;   • ENDOSCOPY N/A 2021    Procedure: ESOPHAGOGASTRODUODENOSCOPY;  Surgeon: Mikael Worthy MD;  Location:  [facility];  Service: Gastroenterology;  Laterality: N/A;   • NO PAST SURGERIES         Family History   Problem Relation Age of Onset   • Anxiety disorder Other    • Arthritis Other    • ADD / ADHD Other    • Heart disease Other         cardiac disorder   • Depression Other    • Diabetes Other    • Hyperlipidemia Other    • Hypertension Other    • Lung cancer Other    • Osteoporosis Other    • Hypertension Brother    • Diabetes Brother    • Hyperlipidemia Mother    • Hypertension Mother    • Colon cancer Neg Hx        Social History     Socioeconomic History   • Marital status:    Tobacco Use   • Smoking status: Former Smoker     Quit date: 2019     Years since quittin.6   • Smokeless tobacco: Never Used   • Tobacco comment: BC PL never smoker    Vaping Use   • Vaping Use: Never used   Substance and Sexual Activity   • Alcohol use: Yes     Alcohol/week: 1.0 standard drink     Types: 1 Glasses of wine per week     Comment: ocassional   • Drug use: No   • Sexual activity: Defer     Comment:  Single            Objective   Physical Exam  Constitutional:       General: She is not in acute distress.     Appearance: Normal appearance. She is well-developed.      Comments: She is a little bit lethargic but can easily engage in conversation and provide history   HENT:      Head: Normocephalic and atraumatic.      Nose: Nose normal. No congestion or rhinorrhea.      Mouth/Throat:      Mouth: Mucous membranes are moist.      Pharynx: No posterior oropharyngeal erythema.   Eyes:      General: No scleral icterus.     Conjunctiva/sclera: Conjunctivae normal.   Neck:      Vascular: No JVD.      Trachea: No tracheal deviation.      Comments: No JVD  Cardiovascular:      Rate and Rhythm: Normal rate and regular rhythm.      Heart sounds: Normal heart sounds. No murmur heard.  No friction rub. No gallop.    Pulmonary:      Effort: Pulmonary effort is normal. No respiratory distress.      Breath sounds: Normal breath sounds. No stridor. No wheezing, rhonchi or rales.   Chest:      Chest wall: No tenderness.   Abdominal:      General: Bowel sounds are normal. There is no distension.      Palpations: Abdomen is soft.      Tenderness: There is no abdominal tenderness. There is no guarding or rebound.   Musculoskeletal:         General: No tenderness or deformity. Normal range of motion.      Cervical back: Normal range of motion and neck supple.      Right lower leg: No edema.      Left lower leg: No edema.   Skin:     General: Skin is warm and dry.      Findings: No erythema or rash.   Neurological:      Motor: No weakness.      Coordination: Coordination normal.      Comments: Mildly lethargic on initial exam   Psychiatric:         Mood and Affect: Mood normal.         Behavior: Behavior normal.         Thought Content: Thought content normal.         Procedures           ED Course  ED Course as of 01/08/22 1947   Sat Jan 08, 2022 1914 She is still lethargic on exam.  Repeat blood sugar 46.  I have been encouraging her  to drink soda and eat fruit which she has done.  It looks like both her long-acting as well as short-acting insulins have been increased.  We read on her labs.  We will bolus with additional D50 [DT]   1933 Discussed with Dr. Aguillon who agrees that admission is indicated and she will admit [DT]      ED Course User Index  [DT] Ramón Dao MD                                                 MDM  Number of Diagnoses or Management Options  Hypoglycemia due to type 1 diabetes mellitus (HCC): new and requires workup  Lethargy: new and requires workup     Amount and/or Complexity of Data Reviewed  Clinical lab tests: reviewed and ordered  Obtain history from someone other than the patient: yes  Review and summarize past medical records: yes  Discuss the patient with other providers: yes  Independent visualization of images, tracings, or specimens: yes    Patient Progress  Patient progress: improved      Final diagnoses:   Hypoglycemia due to type 1 diabetes mellitus (HCC)   Lethargy       ED Disposition  ED Disposition     ED Disposition Condition Comment    Decision to Admit  Level of Care: Telemetry [5]   Diagnosis: Hypoglycemia due to type 1 diabetes mellitus (HCC) [5159735]   Admitting Physician: IRASEMA AGUILLON [[id number]]   Attending Physician: IRASEMA AGUILLON [[id number]]   Certification: I Certify That Inpatient Hospital Services Are Medically Necessary For Greater Than 2 Midnights            No follow-up provider specified.       Medication List      No changes were made to your prescriptions during this visit.          Ramón Dao MD  01/08/22 0632

## 2022-01-09 ENCOUNTER — READMISSION MANAGEMENT (OUTPATIENT)
Dept: CALL CENTER | Facility: HOSPITAL | Age: 62
End: 2022-01-09

## 2022-01-09 VITALS
DIASTOLIC BLOOD PRESSURE: 87 MMHG | BODY MASS INDEX: 18.34 KG/M2 | RESPIRATION RATE: 17 BRPM | SYSTOLIC BLOOD PRESSURE: 160 MMHG | TEMPERATURE: 98.3 F | OXYGEN SATURATION: 100 % | HEART RATE: 101 BPM | HEIGHT: 68 IN | WEIGHT: 121 LBS

## 2022-01-09 PROBLEM — E10.649 HYPOGLYCEMIA DUE TO TYPE 1 DIABETES MELLITUS: Status: RESOLVED | Noted: 2022-01-08 | Resolved: 2022-01-09

## 2022-01-09 LAB
ANION GAP SERPL CALCULATED.3IONS-SCNC: 7 MMOL/L (ref 5–15)
BUN SERPL-MCNC: 18 MG/DL (ref 8–23)
BUN/CREAT SERPL: 20.9 (ref 7–25)
CALCIUM SPEC-SCNC: 8.7 MG/DL (ref 8.6–10.5)
CHLORIDE SERPL-SCNC: 107 MMOL/L (ref 98–107)
CO2 SERPL-SCNC: 27 MMOL/L (ref 22–29)
CREAT SERPL-MCNC: 0.86 MG/DL (ref 0.57–1)
DEPRECATED RDW RBC AUTO: 40.4 FL (ref 37–54)
ERYTHROCYTE [DISTWIDTH] IN BLOOD BY AUTOMATED COUNT: 12.3 % (ref 12.3–15.4)
GFR SERPL CREATININE-BSD FRML MDRD: 81 ML/MIN/1.73
GLUCOSE BLDC GLUCOMTR-MCNC: 140 MG/DL (ref 70–130)
GLUCOSE BLDC GLUCOMTR-MCNC: 141 MG/DL (ref 70–130)
GLUCOSE BLDC GLUCOMTR-MCNC: 186 MG/DL (ref 70–130)
GLUCOSE BLDC GLUCOMTR-MCNC: 238 MG/DL (ref 70–130)
GLUCOSE SERPL-MCNC: 145 MG/DL (ref 65–99)
HBA1C MFR BLD: 13.4 % (ref 4.8–5.6)
HCT VFR BLD AUTO: 31.9 % (ref 34–46.6)
HGB BLD-MCNC: 10.9 G/DL (ref 12–15.9)
MCH RBC QN AUTO: 30.5 PG (ref 26.6–33)
MCHC RBC AUTO-ENTMCNC: 34.2 G/DL (ref 31.5–35.7)
MCV RBC AUTO: 89.4 FL (ref 79–97)
PLATELET # BLD AUTO: 244 10*3/MM3 (ref 140–450)
PMV BLD AUTO: 10.9 FL (ref 6–12)
POTASSIUM SERPL-SCNC: 4.4 MMOL/L (ref 3.5–5.2)
RBC # BLD AUTO: 3.57 10*6/MM3 (ref 3.77–5.28)
SODIUM SERPL-SCNC: 141 MMOL/L (ref 136–145)
WBC NRBC COR # BLD: 5.27 10*3/MM3 (ref 3.4–10.8)

## 2022-01-09 PROCEDURE — 90686 IIV4 VACC NO PRSV 0.5 ML IM: CPT | Performed by: INTERNAL MEDICINE

## 2022-01-09 PROCEDURE — 63710000001 INSULIN DETEMIR PER 5 UNITS: Performed by: INTERNAL MEDICINE

## 2022-01-09 PROCEDURE — 85027 COMPLETE CBC AUTOMATED: CPT | Performed by: PHYSICIAN ASSISTANT

## 2022-01-09 PROCEDURE — G0378 HOSPITAL OBSERVATION PER HR: HCPCS

## 2022-01-09 PROCEDURE — 80048 BASIC METABOLIC PNL TOTAL CA: CPT | Performed by: PHYSICIAN ASSISTANT

## 2022-01-09 PROCEDURE — 82962 GLUCOSE BLOOD TEST: CPT

## 2022-01-09 PROCEDURE — G0008 ADMIN INFLUENZA VIRUS VAC: HCPCS | Performed by: INTERNAL MEDICINE

## 2022-01-09 PROCEDURE — 96375 TX/PRO/DX INJ NEW DRUG ADDON: CPT

## 2022-01-09 PROCEDURE — 25010000002 INFLUENZA VAC SPLIT QUAD 0.5 ML SUSPENSION PREFILLED SYRINGE: Performed by: INTERNAL MEDICINE

## 2022-01-09 PROCEDURE — 25010000002 ONDANSETRON PER 1 MG: Performed by: PHYSICIAN ASSISTANT

## 2022-01-09 PROCEDURE — 83036 HEMOGLOBIN GLYCOSYLATED A1C: CPT | Performed by: PHYSICIAN ASSISTANT

## 2022-01-09 PROCEDURE — 96361 HYDRATE IV INFUSION ADD-ON: CPT

## 2022-01-09 PROCEDURE — 99217 PR OBSERVATION CARE DISCHARGE MANAGEMENT: CPT | Performed by: INTERNAL MEDICINE

## 2022-01-09 PROCEDURE — 63710000001 INSULIN LISPRO (HUMAN) PER 5 UNITS: Performed by: INTERNAL MEDICINE

## 2022-01-09 RX ORDER — INSULIN GLARGINE 100 [IU]/ML
20 INJECTION, SOLUTION SUBCUTANEOUS NIGHTLY
Qty: 15 ML | Refills: 3 | Status: ON HOLD | OUTPATIENT
Start: 2022-01-09 | End: 2022-03-19 | Stop reason: SDUPTHER

## 2022-01-09 RX ADMIN — INSULIN LISPRO 3 UNITS: 100 INJECTION, SOLUTION INTRAVENOUS; SUBCUTANEOUS at 11:44

## 2022-01-09 RX ADMIN — ONDANSETRON 4 MG: 2 INJECTION INTRAMUSCULAR; INTRAVENOUS at 08:49

## 2022-01-09 RX ADMIN — FLUOXETINE HYDROCHLORIDE 20 MG: 20 CAPSULE ORAL at 08:51

## 2022-01-09 RX ADMIN — PANTOPRAZOLE SODIUM 40 MG: 40 TABLET, DELAYED RELEASE ORAL at 08:52

## 2022-01-09 RX ADMIN — POTASSIUM CHLORIDE 40 MEQ: 750 CAPSULE, EXTENDED RELEASE ORAL at 06:27

## 2022-01-09 RX ADMIN — SODIUM CHLORIDE, PRESERVATIVE FREE 10 ML: 5 INJECTION INTRAVENOUS at 08:49

## 2022-01-09 RX ADMIN — GABAPENTIN 400 MG: 400 CAPSULE ORAL at 08:52

## 2022-01-09 RX ADMIN — INFLUENZA VIRUS VACCINE 0.5 ML: 15; 15; 15; 15 SUSPENSION INTRAMUSCULAR at 11:44

## 2022-01-09 RX ADMIN — AMLODIPINE BESYLATE 10 MG: 10 TABLET ORAL at 08:52

## 2022-01-09 RX ADMIN — MEGESTROL ACETATE 40 MG: 40 TABLET ORAL at 08:52

## 2022-01-09 RX ADMIN — TERAZOSIN HYDROCHLORIDE 1 MG: 1 CAPSULE ORAL at 00:20

## 2022-01-09 RX ADMIN — INSULIN DETEMIR 15 UNITS: 100 INJECTION, SOLUTION SUBCUTANEOUS at 11:45

## 2022-01-09 RX ADMIN — ASPIRIN 81 MG: 81 TABLET, COATED ORAL at 08:52

## 2022-01-09 NOTE — OUTREACH NOTE
Prep Survey      Responses   [facility] facility patient discharged from? [facility]   Is LACE score < 7 ? Yes   Emergency Room discharge w/ pulse ox? No   Eligibility [facility]   Date of Admission 01/08/22   Date of Discharge 01/09/22   Discharge Disposition Home or Self Care   Discharge diagnosis Hypoglycemia d/t Type 1 DM, gastroparesis, Severe malnutrition   Does the patient have one of the following disease processes/diagnoses(primary or secondary)? Other   Does the patient have Home health ordered? No   Is there a DME ordered? No   Prep survey completed? Yes          Winter Waite RN

## 2022-01-09 NOTE — DISCHARGE SUMMARY
[facility] Medicine Services  DISCHARGE SUMMARY    Patient Name: Thelma Michael  : 1960  MRN: [id number]    Date of Admission: 2022  6:16 PM  Date of Discharge: 2022  Primary Care Physician: Monet Howe APRN    Consults     No orders found for last 30 day(s).          Hospital Course     Presenting Problem:   Hypoglycemia due to type 1 diabetes mellitus (HCC) [E10.649]    Active Hospital Problems    Diagnosis  POA   • Hypokalemia [E87.6]  Yes   • Gastroparesis [K31.84]  Yes   • History of TIAs [Z86.73]  Not Applicable   • Severe malnutrition (HCC) [E43]  Yes   • Iron deficiency anemia secondary to inadequate dietary iron intake [D50.8]  Yes   • Depression with anxiety [F41.8]  Yes   • Tobacco use [Z72.0]  Yes   • Hypertension [I10]  Yes   • Hyperlipidemia [E78.5]  Yes   • Gastroesophageal reflux disease [K21.9]  Yes   • Diabetic peripheral neuropathy (HCC) [E11.42]  Yes      Resolved Hospital Problems    Diagnosis Date Resolved POA   • **Hypoglycemia due to type 1 diabetes mellitus (HCC) [E10.649] 2022 Yes          Hospital Course:    Thelma Michael is a 61 y.o. female with a past medical history significant for poorly controlled DM1, A1c > 14 with gastroparesis who presented with complaints of lethargy and hypoglycemia. Patient volunteers that her endocrinologist, Dr. Ochoa increased her basaglar to 30 units daily and increased admelog to 15 units AC on 22. She was placed on D10 upon arrival with resolution of her hypoglycemia. Did well overnight off D10 and was able to eat breakfast without difficulty. She will resume her prior dose of 20 units basal insulin w/ SSI at d/c and will follow up with Dr. Ochoa and diabetes educator in near future to begin insulin pump training.     Discharge Follow Up Recommendations for outpatient labs/diagnostics:   Dr. Ochoa 1 week   Outpatient diabetes educator    Day of Discharge     HPI: Up in bed  smiling. Says she feels much better. Was able to eat her usual amount of breakfast. No nausea.     Review of Systems  Gen- No fevers, chills  CV- No chest pain, palpitations  Resp- No cough, dyspnea  GI- No N/V/D, abd pain    Vital Signs:   Temp:  [97.7 °F (36.5 °C)-98.1 °F (36.7 °C)] 98.1 °F (36.7 °C)  Heart Rate:  [64-96] 96  Resp:  [16-18] 17  BP: (133-201)/(68-99) 184/80      Physical Exam:  Constitutional: No acute distress, awake, alert  HENT: NCAT, mucous membranes moist  Respiratory: Clear to auscultation bilaterally, respiratory effort normal   Cardiovascular: RRR, no murmurs, rubs, or gallops  Gastrointestinal: Positive bowel sounds, soft, nontender, nondistended, thin  Musculoskeletal: No bilateral ankle edema  Psychiatric: Appropriate affect, cooperative  Neurologic: Oriented x 3, strength symmetric in all extremities, Cranial Nerves grossly intact to confrontation, speech clear  Skin: No rashes    Pertinent  and/or Most Recent Results     LAB RESULTS:      Lab 01/09/22  0516 01/08/22  1835   WBC 5.27 6.37   HEMOGLOBIN 10.9* 14.0   HEMATOCRIT 31.9* 41.0   PLATELETS 244 293   NEUTROS ABS  --  3.72   IMMATURE GRANS (ABS)  --  0.02   LYMPHS ABS  --  2.17   MONOS ABS  --  0.37   EOS ABS  --  0.07   MCV 89.4 89.7         Lab 01/09/22  0516 01/08/22 1936 01/05/22  1554   SODIUM 141 144  --    POTASSIUM 4.4 2.9*  --    CHLORIDE 107 104  --    CO2 27.0 29.0  --    ANION GAP 7.0 11.0  --    BUN 18 21  --    CREATININE 0.86 1.02*  --    GLUCOSE 145* 42*  --    CALCIUM 8.7 9.5  --    MAGNESIUM  --  2.4  --    HEMOGLOBIN A1C 13.40*  --  14.4         Lab 01/08/22 1936   TOTAL PROTEIN 6.9   ALBUMIN 4.20   GLOBULIN 2.7   ALT (SGPT) 9   AST (SGOT) 14   BILIRUBIN 0.2   ALK PHOS 85         Lab 01/08/22  1936   TROPONIN T <0.010                 Brief Urine Lab Results  (Last result in the past 365 days)      Color   Clarity   Blood   Leuk Est   Nitrite   Protein   CREAT   Urine HCG        07/11/21 1925 Yellow   Clear    Negative   Small (1+)   Negative   Negative               Microbiology Results (last 10 days)     Procedure Component Value - Date/Time    COVID PRE-OP / PRE-PROCEDURE SCREENING ORDER (NO ISOLATION) - Swab, Nasopharynx [[id number]]  (Normal) Collected: 01/08/22 2000    Lab Status: Final result Specimen: Swab from Nasopharynx Updated: 01/08/22 2057    Narrative:      The following orders were created for panel order COVID PRE-OP / PRE-PROCEDURE SCREENING ORDER (NO ISOLATION) - Swab, Nasopharynx.  Procedure                               Abnormality         Status                     ---------                               -----------         ------                     COVID-19, FLU A/B, RSV P...[[id number]]  Normal              Final result                 Please view results for these tests on the individual orders.    COVID-19, FLU A/B, RSV PCR - Swab, Nasopharynx [[id number]]  (Normal) Collected: 01/08/22 2000    Lab Status: Final result Specimen: Swab from Nasopharynx Updated: 01/08/22 2057     COVID19 Not Detected     Influenza A PCR Not Detected     Influenza B PCR Not Detected     RSV, PCR Not Detected          XR Chest 1 View    Result Date: 1/8/2022  CR Chest 1 Vw INDICATION: Hypoglycemia and shortness of breath COMPARISON:  7/11/2021 FINDINGS: Portable AP view(s) of the chest.  The heart and mediastinal contours are normal. The lungs are clear. No pneumothorax or pleural effusion.     No acute cardiopulmonary findings. Signer Name: Samm Gotti MD  Signed: 1/8/2022 7:48 PM  Workstation [facility]: [facility]  [facility]              Results for orders placed during the hospital encounter of 11/19/19    Adult Transesophageal Echo (LIZANDRO) W/ Cont if Necessary Per Protocol    Interpretation Summary  · Left ventricular systolic function is normal. Estimated EF = 65%.  · Left ventricular wall thickness is consistent with hypertrophy.  · Small patent foramen ovale present. Saline test results are  positive with valsalva manuever.  · There is mild mitral valve prolapse of the anterior mitral leaflet.  · Mild tricuspid valve regurgitation is present.  · Estimated right ventricular systolic pressure from tricuspid regurgitation is mildly elevated (35-45 mmHg).  · There is mild plaque in the descending aorta present.      Plan for Follow-up of Pending Labs/Results:     Discharge Details        Discharge Medications      Changes to Medications      Instructions Start Date   BASAGLAR KWIKPEN 100 UNIT/ML injection pen  What changed: how much to take   20 Units, Subcutaneous, Nightly         Continue These Medications      Instructions Start Date   acetaminophen 325 MG tablet  Commonly known as: TYLENOL   650 mg, Oral, Every 4 Hours PRN      albuterol sulfate  (90 Base) MCG/ACT inhaler  Commonly known as: PROVENTIL HFA;VENTOLIN HFA;PROAIR HFA   2 puffs, Inhalation, Every 4 Hours PRN      amLODIPine 10 MG tablet  Commonly known as: NORVASC   Take 1 tablet by mouth once daily      aspirin 81 MG EC tablet   81 mg, Oral, Daily      atorvastatin 80 MG tablet  Commonly known as: LIPITOR   TAKE 1 TABLET BY MOUTH ONCE DAILY AT NIGHT      BD Pen Needle Cydney U/F 32G X 4 MM misc  Generic drug: Insulin Pen Needle   1 each, Oral, 4 Times Daily      Benefiber Drink Mix pack   1 Scoop, Oral, Daily      calcium carbonate 500 MG chewable tablet  Commonly known as: TUMS   2 tablets, Oral, 3 Times Daily PRN      Dexcom G6  device   1 kit, Does not apply, Every 3 Months      Dexcom G6 Sensor   Does not apply, Every 10 Days      Dexcom G6 Transmitter misc   1 kit, Does not apply, Every 3 Months      dicyclomine 20 MG tablet  Commonly known as: Bentyl   20 mg, Oral, Every 6 Hours PRN      donepezil 5 MG tablet  Commonly known as: Aricept   5 mg, Oral, Nightly      doxazosin 1 MG tablet  Commonly known as: CARDURA   TAKE 1 TABLET BY MOUTH ONCE DAILY AT NIGHT      ferrous sulfate 325 (65 FE) MG tablet   325 mg, Oral, Daily  With Breakfast, Take with orange juice or vitamin C      FLUoxetine 20 MG capsule  Commonly known as: PROzac   Take 1 capsule by mouth once daily      gabapentin 400 MG capsule  Commonly known as: NEURONTIN   400 mg, Oral, 3 Times Daily      glucose blood test strip  Commonly known as: Glucose Meter Test   Use as instructed to check blood glucose levels 3 times daily      Insulin Lispro 100 UNIT/ML injection pen  Commonly known as: ADMELOG SOLOSTAR   Inject 0-14 units under skin into appropriate area based upon sliding scale 3 times daily with meals as needed. Do not exceed 40 units per day.      megestrol 40 MG tablet  Commonly known as: MEGACE   40 mg, Oral, Daily      melatonin 5 MG tablet tablet   5 mg, Oral, Nightly PRN      multivitamin tablet tablet   1 tablet, Oral, Daily      Multivitamin-Minerals tablet   1 tablet, Oral, Daily      ondansetron 4 MG tablet  Commonly known as: ZOFRAN   4 mg, Oral, Every 6 Hours PRN      pantoprazole 40 MG EC tablet  Commonly known as: PROTONIX   40 mg, Oral, Daily      sennosides-docusate 8.6-50 MG per tablet  Commonly known as: PERICOLACE   2 tablets, Oral, 2 Times Daily PRN      traMADol 50 MG tablet  Commonly known as: ULTRAM   TAKE 1 TABLET BY MOUTH EVERY 6 HOURS AS NEEDED FOR MODERATE PAIN      traZODone 50 MG tablet  Commonly known as: DESYREL   TAKE ONE TO TWO TABLETS ONE HOUR BEFORE BEDTIME AS NEEDED FOR SLEEP      VITAMIN C PO   Vitamin C TABS      vitamin D 1.25 MG (98196 UT) capsule capsule  Commonly known as: ERGOCALCIFEROL   Take 1 capsule by mouth once a week             No Known Allergies      Discharge Disposition:  Home or Self Care    Diet:  Hospital:  Diet Order   Procedures   • Diet Regular; Consistent Carbohydrate          CODE STATUS:    Code Status and Medical Interventions:   Ordered at: 01/08/22 1955     Level Of Support Discussed With:    Patient     Code Status (Patient has no pulse and is not breathing):    CPR (Attempt to Resuscitate)     Medical  Interventions (Patient has pulse or is breathing):    Full Support       Future Appointments   Date Time Provider Department Center   1/14/2022  1:45 PM Monet Howe APRN [facility]   2/16/2022 10:00 AM Magali Pandey APRN [facility]   4/6/2022  2:45 PM Gerson Ochoa MD [facility]       Additional Instructions for the Follow-ups that You Need to Schedule     Ambulatory Referral to Diabetes Counseling   As directed      Discharge Follow-up with Specialty: Dr. Chua; 1 Week   As directed      Specialty: Dr. Chua    Follow Up: 1 Week                     Anay Gu II,   01/09/22      Time Spent on Discharge:  I spent  34  minutes on this discharge activity which included: face-to-face encounter with the patient, reviewing the data in the system, coordination of the care with the nursing staff as well as consultants, documentation, and entering orders.

## 2022-01-09 NOTE — H&P
[facility] Medicine Services  HISTORY AND PHYSICAL    Patient Name: Thelma Michael  : 1960  MRN: [id number]  Primary Care Physician: Monet Howe APRN  Date of admission: 2022    Subjective   Subjective     Chief Complaint:  lethagy    HPI:  Thelma Michael is a 61 y.o. female with a past medical history significant for poorly controlled DM1, A1c > 14 with gastroparesis, hypertension, HLD, anxiety/depression, history of TIAs and intermittent tobacco abuse. She presents today with complaints of lethargy and hypoglycemia. Patient volunteers that her endocrinologist, Dr. Ochoa increased her basaglar to 30 units daily and increased admelog to 15 units AC on 22. Patient states she hasn't eaten much over the past 2 days and yesterday has some nausea with dry heaving. She reports her blood sugar dropping into the 40's 3 times today after which she tried to eat high sugar foods to bring it up. When she became too lethargic to eat, patient's son was concerned and called EMS.  Patient adds that she hasn't much of an appetite over the past few weeks but denies unintentional weight loss.  Currently there are no complaints of fever, cough, congestion, SOB, or chest pain. No abdominal pain, dysuria, or flank pain. No diarrhea or constipation. Will admit to inpatient for further evaluation and treatment.      COVID Details:    Symptoms:    [x] NONE [] Fever []  Cough [] Shortness of breath [] Change in taste/smell      Review of Systems   Constitutional: Positive for appetite change and fatigue. Negative for chills, fever and unexpected weight change.   HENT: Negative for congestion and trouble swallowing.    Eyes: Negative for photophobia and visual disturbance.   Respiratory: Negative for cough and shortness of breath.    Cardiovascular: Negative for chest pain and leg swelling.   Gastrointestinal: Negative for abdominal pain, nausea and vomiting.   Endocrine:  Negative for cold intolerance and heat intolerance.   Genitourinary: Negative for dysuria and flank pain.   Musculoskeletal: Negative for back pain and gait problem.   Skin: Negative for pallor and rash.   Allergic/Immunologic: Positive for immunocompromised state.   Neurological: Positive for weakness. Negative for dizziness and headaches.   Hematological: Negative for adenopathy.   Psychiatric/Behavioral: Negative for agitation and confusion.        All other systems reviewed and are negative.     Personal History     Past Medical History:   Diagnosis Date   • Acid reflux    • Acute bronchitis    • Cardiac murmur    • Diabetes mellitus (HCC)    • H/O echocardiogram 08/07/2012    i. LVEF 65%.ii. Mild LVH.iii. Borderline evidence of atrial septal aneurysm.  No PFO.    • History of nuclear stress test 08/22/2014    Negative for ischemia and scars; LVEF 77%.     • Hyperlipidemia    • Hypertension    • Impacted cerumen of both ears    • Migraine    • Self-catheterizes urinary bladder    • Sinusitis    • Stroke (HCC)    • Tobacco abuse     quit 4 days ago.     • Urticaria        Past Surgical History:   Procedure Laterality Date   • CAPSULE ENDOSCOPY  7/27/2021    Procedure: PILLCAM DEPLOYMENT;  Surgeon: Mikael Worthy MD;  Location:  [facility];  Service: Gastroenterology;;   • COLONOSCOPY     • COLONOSCOPY N/A 7/27/2021    Procedure: COLONOSCOPY;  Surgeon: Mikael Worthy MD;  Location:  [facility];  Service: Gastroenterology;  Laterality: N/A;   • DENTAL PROCEDURE     • ENDOSCOPY N/A 6/30/2021    Procedure: ESOPHAGOGASTRODUODENOSCOPY;  Surgeon: Brunner, Mark I, MD;  Location:  [facility];  Service: Gastroenterology;  Laterality: N/A;   • ENDOSCOPY N/A 7/27/2021    Procedure: ESOPHAGOGASTRODUODENOSCOPY;  Surgeon: Mikael Worthy MD;  Location:  [facility];  Service: Gastroenterology;  Laterality: N/A;   • NO PAST SURGERIES         Family History: family history includes ADD / ADHD  in an other family member; Anxiety disorder in an other family member; Arthritis in an other family member; Depression in an other family member; Diabetes in her brother and another family member; Heart disease in an other family member; Hyperlipidemia in her mother and another family member; Hypertension in her brother, mother, and another family member; Lung cancer in an other family member; Osteoporosis in an other family member. Otherwise pertinent FHx was reviewed and unremarkable.     Social History:  reports that she quit smoking about 2 years ago. She has never used smokeless tobacco. She reports current alcohol use of about 1.0 standard drink of alcohol per week. She reports that she does not use drugs.  She is , lives with her son.    Medications:  Ascorbic Acid, BASAGLAR KWIKPEN, Benefiber Drink Mix, Dexcom G6 , Dexcom G6 Sensor, Dexcom G6 Transmitter, FLUoxetine, Insulin Lispro, Insulin Pen Needle, Multivitamin-Minerals, acetaminophen, albuterol sulfate HFA, amLODIPine, aspirin, atorvastatin, calcium carbonate, dicyclomine, donepezil, doxazosin, ferrous sulfate, gabapentin, glucose blood, megestrol, melatonin, multivitamin, ondansetron, pantoprazole, sennosides-docusate, traMADol, traZODone, and vitamin D    No Known Allergies    Objective   Objective     Vital Signs:   Temp:  [97.7 °F (36.5 °C)] 97.7 °F (36.5 °C)  Heart Rate:  [70] 70  Resp:  [16] 16  BP: (148)/(88) 148/88  Stable on RA  Physical Exam   Constitutional: Awake, alert, underweight  Eyes: PERRLA, sclerae anicteric, no conjunctival injection  HENT: NCAT, mucous membranes moist  Neck: Supple, no thyromegaly, no lymphadenopathy, trachea midline  Respiratory: Clear to auscultation bilaterally, nonlabored respirations   Cardiovascular: RRR, no murmurs, rubs, or gallops, palpable pedal pulses bilaterally  Gastrointestinal: Positive bowel sounds, soft, nontender, nondistended  Musculoskeletal: No bilateral ankle edema, no clubbing  or cyanosis to extremities  Psychiatric: Appropriate affect, cooperative  Neurologic: Oriented x 3, strength symmetric in all extremities, Cranial Nerves grossly intact to confrontation, speech clear  Skin: No rashes      Results Reviewed:  I have personally reviewed most recent indicated data and agree with findings including:  [x]  Laboratory  [x]  Radiology  []  EKG/Telemetry  []  Pathology  []  Cardiac/Vascular Studies  [x]  Old records  []  Other:  Most pertinent findings include: vitals table. Blood sugar 46. Creatinine 1.02. potassium 2.9. chemistry and hematology otherwise favorable.       LAB RESULTS:      Lab 01/08/22  1835   WBC 6.37   HEMOGLOBIN 14.0   HEMATOCRIT 41.0   PLATELETS 293   NEUTROS ABS 3.72   IMMATURE GRANS (ABS) 0.02   LYMPHS ABS 2.17   MONOS ABS 0.37   EOS ABS 0.07   MCV 89.7         Lab 01/08/22  1936 01/05/22  1554   SODIUM 144  --    POTASSIUM 2.9*  --    CHLORIDE 104  --    CO2 29.0  --    ANION GAP 11.0  --    BUN 21  --    CREATININE 1.02*  --    GLUCOSE 42*  --    CALCIUM 9.5  --    MAGNESIUM 2.4  --    HEMOGLOBIN A1C  --  14.4         Lab 01/08/22 1936   TOTAL PROTEIN 6.9   ALBUMIN 4.20   GLOBULIN 2.7   ALT (SGPT) 9   AST (SGOT) 14   BILIRUBIN 0.2   ALK PHOS 85         Lab 01/08/22 1936   TROPONIN T <0.010                 Brief Urine Lab Results  (Last result in the past 365 days)      Color   Clarity   Blood   Leuk Est   Nitrite   Protein   CREAT   Urine HCG        07/11/21 1925 Yellow   Clear   Negative   Small (1+)   Negative   Negative               Microbiology Results (last 10 days)     Procedure Component Value - Date/Time    COVID PRE-OP / PRE-PROCEDURE SCREENING ORDER (NO ISOLATION) - Swab, Nasopharynx [[id number]]  (Normal) Collected: 01/08/22 2000    Lab Status: Final result Specimen: Swab from Nasopharynx Updated: 01/08/22 2057    Narrative:      The following orders were created for panel order COVID PRE-OP / PRE-PROCEDURE SCREENING ORDER (NO ISOLATION) - Swab,  Nasopharynx.  Procedure                               Abnormality         Status                     ---------                               -----------         ------                     COVID-19, FLU A/B, RSV P...[[id number]]  Normal              Final result                 Please view results for these tests on the individual orders.    COVID-19, FLU A/B, RSV PCR - Swab, Nasopharynx [[id number]]  (Normal) Collected: 01/08/22 2000    Lab Status: Final result Specimen: Swab from Nasopharynx Updated: 01/08/22 2057     COVID19 Not Detected     Influenza A PCR Not Detected     Influenza B PCR Not Detected     RSV, PCR Not Detected          XR Chest 1 View    Result Date: 1/8/2022  CR Chest 1 Vw INDICATION: Hypoglycemia and shortness of breath COMPARISON:  7/11/2021 FINDINGS: Portable AP view(s) of the chest.  The heart and mediastinal contours are normal. The lungs are clear. No pneumothorax or pleural effusion.     Impression: No acute cardiopulmonary findings. Signer Name: Samm Gotti MD  Signed: 1/8/2022 7:48 PM  Workstation Name: [email or web address]  [facility]      Results for orders placed during the hospital encounter of 11/19/19    Adult Transesophageal Echo (LIZANDRO) W/ Cont if Necessary Per Protocol    Interpretation Summary  · Left ventricular systolic function is normal. Estimated EF = 65%.  · Left ventricular wall thickness is consistent with hypertrophy.  · Small patent foramen ovale present. Saline test results are positive with valsalva manuever.  · There is mild mitral valve prolapse of the anterior mitral leaflet.  · Mild tricuspid valve regurgitation is present.  · Estimated right ventricular systolic pressure from tricuspid regurgitation is mildly elevated (35-45 mmHg).  · There is mild plaque in the descending aorta present.      Assessment/Plan   Assessment & Plan       Hypoglycemia due to type 1 diabetes mellitus (HCC)    Gastroesophageal reflux disease    Diabetic peripheral  neuropathy (HCC)    Hyperlipidemia    Hypertension    Tobacco use    Depression with anxiety    Iron deficiency anemia secondary to inadequate dietary iron intake    Severe malnutrition (HCC)    History of TIAs    Gastroparesis    Hypokalemia      Thelma Michael is a 61 y.o. female with a past medical history significant for poorly controlled DM1, A1c > 14 with gastroparesis, hypertension, HLD, anxiety/depression, history of TIAs and intermittent tobacco abuse. She presents today with complaints of lethargy and hypoglycemia. Patient volunteers that her endocrinologist, Dr. Ochoa increased her basaglar to 30 units daily and increased admelog to 15 units AC on 1/5/22.     1. Hypoglycemia:  - uncontrolled DM1 A1c > 14, increased insulin with poor appetite  - administered 1 amp D50, continue D10 infusion at 50 cc/hr  - Q 1hour finger sticks  - hypoglycemia protocol  - encourage PO intake and continue megace  - nutrition consult  - AM cortisol and AM labs  -suggested she bring her insulin pump tomorrow for us to monitor for a day    2. Hypokalemia:  - check magnesium, obtain EKG  - replace as needed per protocol    3. N/V:  - check lipase, lactic acid  - history of gastroparesis, but consider Imaging of abdomen    4. Neuropathy:  - continue gabapentin    5. Hypertension:  - continue norvasc, cardura,     6. Anxiety/depression:  - continue aricept, prozac, trazadone    DVT prophylaxis:  mechanical    CODE STATUS:  Full code  Level Of Support Discussed With: Patient  Code Status (Patient has no pulse and is not breathing): CPR (Attempt to Resuscitate)  Medical Interventions (Patient has pulse or is breathing): Full Support      This note has been completed as part of a split-shared workflow.     Electronically signed by Anna Cisneros PA-C, 01/08/22, 9:18 PM EST.  Attending   Admission Attestation       I have seen and examined the patient, performing an independent face-to-face diagnostic evaluation with plan of  care reviewed and developed with the advanced practice clinician (APC).      Brief Summary Statement:     Thelma Michael is a 61 y.o. female with a past medical history significant for poorly controlled DM1, A1c > 14 with gastroparesis, hypertension, HLD, anxiety/depression, history of TIAs and intermittent tobacco abuse. She presents today with complaints of lethargy and hypoglycemia. Patient volunteers that her endocrinologist, Dr. Ochoa increased her basaglar to 30 units daily and increased admelog to 15 units AC on 1/5/22.   Patient is feeling better now. She has chronic weight loss, but appetite the last few days.     Remainder of detailed HPI is as noted by APC and has been reviewed and/or edited by me for completeness.    Attending Physical Exam:  Temp:  [97.7 °F (36.5 °C)] 97.7 °F (36.5 °C)  Heart Rate:  [70] 70  Resp:  [16] 16  BP: (148)/(88) 148/88    Patient is alert and talkative in no distress at rest, thin wirey  Neck is without mass or JVD  Heart is Reg wo murmur  Lungs are clear wo wheeze or crackle  Abdomen is soft without HSM or mass, not tender or distended  MAEW, no edema  Skin is without rash  Neurologic exam is nonfocal   Mood is appropriate    Brief Assessment/Plan :  See detailed assessment and plan developed with APC which I have reviewed and/or edited for completeness.    I believe this patient meets OBS status    Electronically signed by Kath Sewell MD, 01/08/22, 10:21 PM EST.

## 2022-01-10 ENCOUNTER — TRANSITIONAL CARE MANAGEMENT TELEPHONE ENCOUNTER (OUTPATIENT)
Dept: CALL CENTER | Facility: HOSPITAL | Age: 62
End: 2022-01-10

## 2022-01-10 DIAGNOSIS — E10.65 UNCONTROLLED TYPE 1 DIABETES MELLITUS WITH HYPERGLYCEMIA: Primary | ICD-10-CM

## 2022-01-10 NOTE — OUTREACH NOTE
Call Center TCM Note      Responses   [facility] patient discharged from? [facility]   Does the patient have one of the following disease processes/diagnoses(primary or secondary)? Other   TCM attempt successful? Yes   Call end time 1604   Discharge diagnosis Hypoglycemia d/t Type 1 DM, gastroparesis, Severe malnutrition   Meds reviewed with patient/caregiver? Yes   Is the patient having any side effects they believe may be caused by any medication additions or changes? No   Does the patient have all medications ordered at discharge? Yes   Is the patient taking all medications as directed (includes completed medication regime)? Yes   Does the patient have an appointment with their PCP within 7 days of discharge? Yes   Comments regarding PCP PCP appt on 1/14/22 @ 1:45pm, however appt is for 15 mins and does not state HOSP DC FU. DID not see any available HOSP DC FU appts that meet TCM guidelines . Will route to PCP office.    Has the patient kept scheduled appointments due by today? N/A   Psychosocial issues? No   Did the patient receive a copy of their discharge instructions? Yes   Nursing interventions Reviewed instructions with patient   What is the patient's perception of their health status since discharge? Improving   Is the patient/caregiver able to teach back signs and symptoms related to disease process for when to call PCP? Yes   Is the patient/caregiver able to teach back signs and symptoms related to disease process for when to call 911? Yes   Is the patient/caregiver able to teach back the hierarchy of who to call/visit for symptoms/problems? PCP, Specialist, Home health nurse, Urgent Care, ED, 911 Yes   TCM call completed? Yes   Wrap up additional comments Pt reports she is doing much better and has meds as ordered.           Marisela Pablo RN    1/10/2022, 16:04 EST

## 2022-01-12 ENCOUNTER — PRIOR AUTHORIZATION (OUTPATIENT)
Dept: ENDOCRINOLOGY | Facility: CLINIC | Age: 62
End: 2022-01-12

## 2022-01-12 RX ORDER — INSULIN LISPRO 100 [IU]/ML
INJECTION, SOLUTION INTRAVENOUS; SUBCUTANEOUS
Qty: 45 ML | Refills: 3 | Status: SHIPPED | OUTPATIENT
Start: 2022-01-12 | End: 2022-03-19 | Stop reason: HOSPADM

## 2022-01-12 NOTE — TELEPHONE ENCOUNTER
Deniedon January 8  This request has not been approved. This request is denied because we did not receive enough information from your provider. We contacted your provider but did not receive a response within the required time limit. Please follow up with your provider. Based on the information submitted for review, you did not meet our guideline rules for the requested drug. In order for your request to be approved, your provider would need to show that you have met the guideline rules below. The details below are written in medical language. If you have questions, please contact your provider. In some cases, the requested medication or alternatives offered may have additional approval requirements. Our guideline named INSULIN AND RELATED AGENTS requires the following rule(s) be met for approval: There is clinical rationale (such as intolerance [side effect] to an inactive ingredient [ingredient in drug not used to treat the condition]) that a preferred product cannot be used.Your doctor told us you have a diagnosis of type 1 diabetes [a disorder with high blood sugar] and that you have tried Humalog and an additional preferred insulin.We do not have any records or charts notes showing there is clinical rationale as to why a preferred product cannot be used.This is why your request is denied. Please work with your doctor to discuss your treatment options or give us more information if it will allow us to approve this request. A written notification letter will follow with additional details.  Drug  Admelog SoloStar 100UNIT/ML pen-injectors  Form  [employer] [place] Medicaid ePA Form 2017 [employer]

## 2022-01-14 ENCOUNTER — OFFICE VISIT (OUTPATIENT)
Dept: INTERNAL MEDICINE | Facility: CLINIC | Age: 62
End: 2022-01-14

## 2022-01-14 VITALS
BODY MASS INDEX: 18.94 KG/M2 | SYSTOLIC BLOOD PRESSURE: 148 MMHG | HEART RATE: 87 BPM | WEIGHT: 125 LBS | OXYGEN SATURATION: 98 % | DIASTOLIC BLOOD PRESSURE: 78 MMHG | HEIGHT: 68 IN

## 2022-01-14 DIAGNOSIS — M54.50 CHRONIC MIDLINE LOW BACK PAIN WITHOUT SCIATICA: ICD-10-CM

## 2022-01-14 DIAGNOSIS — E10.65 UNCONTROLLED TYPE 1 DIABETES MELLITUS WITH HYPERGLYCEMIA: Primary | ICD-10-CM

## 2022-01-14 DIAGNOSIS — G89.29 CHRONIC MIDLINE LOW BACK PAIN WITHOUT SCIATICA: ICD-10-CM

## 2022-01-14 DIAGNOSIS — E11.42 DIABETIC PERIPHERAL NEUROPATHY: ICD-10-CM

## 2022-01-14 DIAGNOSIS — F41.8 DEPRESSION WITH ANXIETY: ICD-10-CM

## 2022-01-14 DIAGNOSIS — I10 ESSENTIAL HYPERTENSION: ICD-10-CM

## 2022-01-14 PROCEDURE — 99214 OFFICE O/P EST MOD 30 MIN: CPT | Performed by: NURSE PRACTITIONER

## 2022-01-14 RX ORDER — GABAPENTIN 400 MG/1
400 CAPSULE ORAL 2 TIMES DAILY PRN
Qty: 60 CAPSULE | Refills: 1 | Status: SHIPPED | OUTPATIENT
Start: 2022-01-14 | End: 2022-01-28 | Stop reason: SDUPTHER

## 2022-01-14 NOTE — PROGRESS NOTES
Subjective   Chief Complaint   Patient presents with   • Diabetes     follow up , recent A1C in labs    • Hospital Follow Up Visit      Thelma Michael is a 61 y.o. female here today for diabetes, hypertension, neuropathy, depression, and anxiety.     Needs mammogram and bone scan ordered. Haven't taken her bp meds today.     She had insulin increased and she had't eaten enough and ended up on ER. She just got insulin pump. Had class and doing well with this. Puts in 30g for eating and hit button if not.     Sleeping ok. Depression and anxiety good.     I have reviewed the following portions of the patient's history and confirmed they are accurate: allergies, current medications, past family history, past medical history, past social history, past surgical history and problem list    I have personally completed the patient's review of systems.    Review of Systems   Constitutional: Positive for fatigue. Negative for activity change, appetite change, chills, diaphoresis, fever, unexpected weight gain and unexpected weight loss.   HENT: Negative for ear discharge, ear pain, mouth sores, nosebleeds, sinus pressure, sneezing and sore throat.    Eyes: Negative for pain, discharge and itching.   Respiratory: Negative for cough, chest tightness, shortness of breath and wheezing.    Cardiovascular: Negative for chest pain and palpitations.   Gastrointestinal: Negative for abdominal pain, diarrhea, nausea, vomiting, GERD and indigestion.   Endocrine: Negative for heat intolerance, polydipsia and polyphagia.   Genitourinary: Negative for dysuria, flank pain, frequency, hematuria, pelvic pain, pelvic pressure and urgency.   Musculoskeletal: Positive for arthralgias, back pain and myalgias. Negative for gait problem, joint swelling, neck pain and neck stiffness.   Skin: Negative for color change, pallor and rash.   Allergic/Immunologic: Negative for immunocompromised state.   Neurological: Positive for numbness and memory  problem. Negative for dizziness, seizures, speech difficulty, light-headedness, headache and confusion.   Hematological: Negative for adenopathy. Bruises/bleeds easily.   Psychiatric/Behavioral: Positive for stress. Negative for agitation, decreased concentration, dysphoric mood, sleep disturbance and depressed mood. The patient is nervous/anxious.        Current Outpatient Medications on File Prior to Visit   Medication Sig   • acetaminophen (TYLENOL) 325 MG tablet Take 2 tablets by mouth Every 4 (Four) Hours As Needed for Mild Pain .   • amLODIPine (NORVASC) 10 MG tablet Take 1 tablet by mouth once daily   • Ascorbic Acid (VITAMIN C PO) Vitamin C TABS   • aspirin 81 MG EC tablet Take 1 tablet by mouth Daily.   • atorvastatin (LIPITOR) 80 MG tablet TAKE 1 TABLET BY MOUTH ONCE DAILY AT NIGHT   • calcium carbonate (TUMS) 500 MG chewable tablet Chew 1,000 mg 3 (Three) Times a Day As Needed for Indigestion or Heartburn.   • Continuous Blood Gluc  (Dexcom G6 ) device 1 kit Every 3 (Three) Months.   • Continuous Blood Gluc Sensor (Dexcom G6 Sensor) Every 10 (Ten) Days.   • Continuous Blood Gluc Transmit (Dexcom G6 Transmitter) misc 1 kit Every 3 (Three) Months.   • donepezil (Aricept) 5 MG tablet Take 1 tablet by mouth Every Night.   • doxazosin (CARDURA) 1 MG tablet TAKE 1 TABLET BY MOUTH ONCE DAILY AT NIGHT   • ferrous sulfate 325 (65 FE) MG tablet Take 1 tablet by mouth Daily With Breakfast. Take with orange juice or vitamin C   • FLUoxetine (PROzac) 20 MG capsule Take 1 capsule by mouth once daily   • glucose blood (Glucose Meter Test) test strip Use as instructed to check blood glucose levels 3 times daily   • Insulin Glargine (BASAGLAR KWIKPEN) 100 UNIT/ML injection pen Inject 20 Units under the skin into the appropriate area as directed Every Night.   • Insulin Lispro, 1 Unit Dial, (HumaLOG KwikPen) 100 UNIT/ML solution pen-injector 15 units tid   • Insulin Pen Needle (BD Pen Needle Cydney U/F) 32G X  "4 MM misc Take 1 each by mouth 4 (Four) Times a Day.   • melatonin 5 MG tablet tablet Take 1 tablet by mouth At Night As Needed (sleep).   • Multiple Vitamins-Minerals (Multivitamin-Minerals) tablet Take 1 tablet by mouth Daily.   • multivitamin (Multiple Vitamin) tablet tablet Take 1 tablet by mouth Daily.   • ondansetron (ZOFRAN) 4 MG tablet Take 1 tablet by mouth Every 6 (Six) Hours As Needed for Nausea or Vomiting.   • pantoprazole (PROTONIX) 40 MG EC tablet Take 1 tablet by mouth Daily.   • sennosides-docusate (senna-docusate sodium) 8.6-50 MG per tablet Take 2 tablets by mouth 2 (Two) Times a Day As Needed for Constipation.   • traMADol (ULTRAM) 50 MG tablet TAKE 1 TABLET BY MOUTH EVERY 6 HOURS AS NEEDED FOR MODERATE PAIN   • vitamin D (ERGOCALCIFEROL) 1.25 MG (18687 UT) capsule capsule Take 1 capsule by mouth once a week   • Wheat Dextrin (Benefiber Drink Mix) pack Take 1 Scoop by mouth Daily.   • albuterol sulfate  (90 Base) MCG/ACT inhaler Inhale 2 puffs Every 4 (Four) Hours As Needed for Wheezing.   • megestrol (MEGACE) 40 MG tablet Take 1 tablet by mouth Daily.     No current facility-administered medications on file prior to visit.       Objective   Vitals:    01/14/22 1353   BP: 148/78   Pulse: 87   SpO2: 98%   Weight: 56.7 kg (125 lb)   Height: 172.7 cm (68\")     Body mass index is 19.01 kg/m².    Physical Exam  Vitals reviewed.   Constitutional:       Appearance: Normal appearance. She is well-developed.   HENT:      Head: Normocephalic and atraumatic.      Nose: Nose normal.   Eyes:      General: Lids are normal.      Conjunctiva/sclera: Conjunctivae normal.      Pupils: Pupils are equal, round, and reactive to light.   Neck:      Thyroid: No thyromegaly.      Trachea: Trachea normal.   Cardiovascular:      Rate and Rhythm: Normal rate and regular rhythm.      Heart sounds: Normal heart sounds.   Pulmonary:      Effort: Pulmonary effort is normal. No respiratory distress.      Breath sounds: " Normal breath sounds.   Musculoskeletal:      Lumbar back: Spasms and tenderness present.   Skin:     General: Skin is warm and dry.   Neurological:      Mental Status: She is alert and oriented to person, place, and time.      GCS: GCS eye subscore is 4. GCS verbal subscore is 5. GCS motor subscore is 6.   Psychiatric:         Attention and Perception: Attention normal.         Mood and Affect: Mood and affect normal.         Speech: Speech normal.         Behavior: Behavior normal. Behavior is cooperative.         Thought Content: Thought content normal.         Assessment/Plan   Problem List Items Addressed This Visit        Endocrine and Metabolic    Uncontrolled type 1 diabetes mellitus with hyperglycemia (HCC) - Primary    Overview     dx'd age 38 . Has had dka            Mental Health    Depression with anxiety    Overview     Chronic issue unstable requiring medication management and monitoring. Will eat well balanced diet, increase water intake, increase physical activity during the day, and get adequate rest. Discussed relaxation and coping skills and exercises.   Start prozac.             Musculoskeletal and Injuries    Back pain       Neuro    Diabetic peripheral neuropathy (HCC)    Relevant Medications    gabapentin (NEURONTIN) 400 MG capsule      Other Visit Diagnoses     Essential hypertension                 Current Outpatient Medications:   •  acetaminophen (TYLENOL) 325 MG tablet, Take 2 tablets by mouth Every 4 (Four) Hours As Needed for Mild Pain ., Disp: , Rfl:   •  amLODIPine (NORVASC) 10 MG tablet, Take 1 tablet by mouth once daily, Disp: 90 tablet, Rfl: 0  •  Ascorbic Acid (VITAMIN C PO), Vitamin C TABS, Disp: , Rfl:   •  aspirin 81 MG EC tablet, Take 1 tablet by mouth Daily., Disp: , Rfl:   •  atorvastatin (LIPITOR) 80 MG tablet, TAKE 1 TABLET BY MOUTH ONCE DAILY AT NIGHT, Disp: 90 tablet, Rfl: 0  •  calcium carbonate (TUMS) 500 MG chewable tablet, Chew 1,000 mg 3 (Three) Times a Day As  Needed for Indigestion or Heartburn., Disp: , Rfl:   •  Continuous Blood Gluc  (Dexcom G6 ) device, 1 kit Every 3 (Three) Months., Disp: 1 each, Rfl: 0  •  Continuous Blood Gluc Sensor (Dexcom G6 Sensor), Every 10 (Ten) Days., Disp: 9 each, Rfl: 3  •  Continuous Blood Gluc Transmit (Dexcom G6 Transmitter) misc, 1 kit Every 3 (Three) Months., Disp: 1 each, Rfl: 3  •  donepezil (Aricept) 5 MG tablet, Take 1 tablet by mouth Every Night., Disp: 30 tablet, Rfl: 1  •  doxazosin (CARDURA) 1 MG tablet, TAKE 1 TABLET BY MOUTH ONCE DAILY AT NIGHT, Disp: 90 tablet, Rfl: 0  •  ferrous sulfate 325 (65 FE) MG tablet, Take 1 tablet by mouth Daily With Breakfast. Take with orange juice or vitamin C, Disp: 30 tablet, Rfl: 2  •  FLUoxetine (PROzac) 20 MG capsule, Take 1 capsule by mouth once daily, Disp: 90 capsule, Rfl: 0  •  gabapentin (NEURONTIN) 400 MG capsule, Take 1 capsule by mouth 2 (Two) Times a Day As Needed (leg and foot pain)., Disp: 60 capsule, Rfl: 1  •  glucose blood (Glucose Meter Test) test strip, Use as instructed to check blood glucose levels 3 times daily, Disp: 100 each, Rfl: 5  •  Insulin Glargine (BASAGLAR KWIKPEN) 100 UNIT/ML injection pen, Inject 20 Units under the skin into the appropriate area as directed Every Night., Disp: 15 mL, Rfl: 3  •  Insulin Lispro, 1 Unit Dial, (HumaLOG KwikPen) 100 UNIT/ML solution pen-injector, 15 units tid, Disp: 45 mL, Rfl: 3  •  Insulin Pen Needle (BD Pen Needle Cydney U/F) 32G X 4 MM misc, Take 1 each by mouth 4 (Four) Times a Day., Disp: 100 each, Rfl: 5  •  melatonin 5 MG tablet tablet, Take 1 tablet by mouth At Night As Needed (sleep)., Disp: , Rfl:   •  Multiple Vitamins-Minerals (Multivitamin-Minerals) tablet, Take 1 tablet by mouth Daily., Disp: , Rfl:   •  multivitamin (Multiple Vitamin) tablet tablet, Take 1 tablet by mouth Daily., Disp: 30 tablet, Rfl: 11  •  ondansetron (ZOFRAN) 4 MG tablet, Take 1 tablet by mouth Every 6 (Six) Hours As Needed for  Nausea or Vomiting., Disp: 15 tablet, Rfl: 0  •  pantoprazole (PROTONIX) 40 MG EC tablet, Take 1 tablet by mouth Daily., Disp: 30 tablet, Rfl: 5  •  sennosides-docusate (senna-docusate sodium) 8.6-50 MG per tablet, Take 2 tablets by mouth 2 (Two) Times a Day As Needed for Constipation., Disp: 60 tablet, Rfl: 5  •  traMADol (ULTRAM) 50 MG tablet, TAKE 1 TABLET BY MOUTH EVERY 6 HOURS AS NEEDED FOR MODERATE PAIN, Disp: 28 tablet, Rfl: 0  •  vitamin D (ERGOCALCIFEROL) 1.25 MG (67144 UT) capsule capsule, Take 1 capsule by mouth once a week, Disp: 4 capsule, Rfl: 0  •  Wheat Dextrin (Benefiber Drink Mix) pack, Take 1 Scoop by mouth Daily., Disp: 30 each, Rfl: 5  •  albuterol sulfate  (90 Base) MCG/ACT inhaler, Inhale 2 puffs Every 4 (Four) Hours As Needed for Wheezing., Disp: 18 g, Rfl: 5  •  megestrol (MEGACE) 40 MG tablet, Take 1 tablet by mouth Daily., Disp: 30 tablet, Rfl: 2  •  traZODone (DESYREL) 50 MG tablet, TAKE 1-2 TABLETS ONE HOUR BEFORE BEDTIME AS NEEDED FOR SLEEP., Disp: 45 tablet, Rfl: 0       Plan of care reviewed with the patient at the conclusion of today's visit.  Education was provided regarding diagnosis, management, and any prescribed or recommended OTC medications.  Patient verbalized understanding of and agreement with management plan.     Return in about 3 months (around 4/14/2022), or if symptoms worsen or fail to improve.      Monet Chau, APRN

## 2022-01-25 LAB
QT INTERVAL: 478 MS
QTC INTERVAL: 512 MS

## 2022-01-28 ENCOUNTER — TELEPHONE (OUTPATIENT)
Dept: INTERNAL MEDICINE | Facility: CLINIC | Age: 62
End: 2022-01-28

## 2022-01-28 DIAGNOSIS — M19.90 OSTEOARTHRITIS, UNSPECIFIED OSTEOARTHRITIS TYPE, UNSPECIFIED SITE: ICD-10-CM

## 2022-01-28 DIAGNOSIS — K21.9 GASTROESOPHAGEAL REFLUX DISEASE: ICD-10-CM

## 2022-01-28 DIAGNOSIS — E11.42 DIABETIC PERIPHERAL NEUROPATHY: ICD-10-CM

## 2022-01-28 DIAGNOSIS — M25.50 GENERALIZED JOINT PAIN: ICD-10-CM

## 2022-01-28 DIAGNOSIS — F41.8 DEPRESSION WITH ANXIETY: ICD-10-CM

## 2022-01-28 RX ORDER — TRAZODONE HYDROCHLORIDE 50 MG/1
TABLET ORAL
Qty: 45 TABLET | Refills: 0 | Status: SHIPPED | OUTPATIENT
Start: 2022-01-28 | End: 2022-08-02

## 2022-01-28 NOTE — TELEPHONE ENCOUNTER
Incoming Refill Request      Medication requested (name and dose): traMADol (ULTRAM) 50 MG tablet, pantoprazole (PROTONIX) 40 MG EC tablet, multivitamin (Multiple Vitamin) tablet tablet, gabapentin (NEURONTIN) 400 MG capsule, FLUoxetine (PROzac) 20 MG capsule, doxazosin (CARDURA) 1 MG tablet, atorvastatin (LIPITOR) 80 MG tablet, amLODIPine (NORVASC) 10 MG tablet    Pharmacy where request should be sent: [facility], [place]    Additional details provided by patient: PATIENT HAS THREE DAYS LEFT    Best call back number: [phone]    Does the patient have less than a 3 day supply:  [] Yes  [x] No    Nicol Miller Rep  01/28/22, 13:07 EST

## 2022-01-28 NOTE — TELEPHONE ENCOUNTER
Caller: Thelma Michael    Relationship: Self    Best call back number:     What is the best time to reach you: ANYTIME    Who are you requesting to speak with (clinical staff, provider,  specific staff member): CLINICAL STAFF    Do you know the name of the person who called: THELMA    What was the call regarding: PATIENT HAS QUESTIONS ABOUT HER MEDICATIONS; SHE HAS BEEN IN THE HOSPITAL AND NOT SURE WHICH MEDICATION SHE NEEDS TO BE TAKING AND WHAT THE MEDICATIONS ARE FOR    Do you require a callback: YES

## 2022-01-31 RX ORDER — PANTOPRAZOLE SODIUM 40 MG/1
40 TABLET, DELAYED RELEASE ORAL DAILY
Qty: 30 TABLET | Refills: 5 | Status: SHIPPED | OUTPATIENT
Start: 2022-01-31 | End: 2022-08-02

## 2022-01-31 RX ORDER — TRAMADOL HYDROCHLORIDE 50 MG/1
50 TABLET ORAL EVERY 6 HOURS PRN
Qty: 28 TABLET | Refills: 0 | Status: SHIPPED | OUTPATIENT
Start: 2022-01-31 | End: 2022-03-03 | Stop reason: SDUPTHER

## 2022-01-31 RX ORDER — ATORVASTATIN CALCIUM 80 MG/1
80 TABLET, FILM COATED ORAL NIGHTLY
Qty: 90 TABLET | Refills: 1 | Status: SHIPPED | OUTPATIENT
Start: 2022-01-31

## 2022-01-31 RX ORDER — AMLODIPINE BESYLATE 10 MG/1
10 TABLET ORAL DAILY
Qty: 90 TABLET | Refills: 1 | Status: SHIPPED | OUTPATIENT
Start: 2022-01-31 | End: 2022-12-12 | Stop reason: HOSPADM

## 2022-01-31 RX ORDER — DIPHENOXYLATE HYDROCHLORIDE AND ATROPINE SULFATE 2.5; .025 MG/1; MG/1
1 TABLET ORAL DAILY
Qty: 30 TABLET | Refills: 11 | Status: SHIPPED | OUTPATIENT
Start: 2022-01-31 | End: 2022-03-24

## 2022-01-31 RX ORDER — GABAPENTIN 400 MG/1
400 CAPSULE ORAL 2 TIMES DAILY PRN
Qty: 60 CAPSULE | Refills: 1 | Status: SHIPPED | OUTPATIENT
Start: 2022-01-31 | End: 2023-02-06 | Stop reason: HOSPADM

## 2022-01-31 RX ORDER — DOXAZOSIN MESYLATE 1 MG/1
1 TABLET ORAL NIGHTLY
Qty: 90 TABLET | Refills: 1 | Status: SHIPPED | OUTPATIENT
Start: 2022-01-31 | End: 2022-11-14

## 2022-01-31 RX ORDER — FLUOXETINE HYDROCHLORIDE 20 MG/1
20 CAPSULE ORAL DAILY
Qty: 90 CAPSULE | Refills: 1 | Status: SHIPPED | OUTPATIENT
Start: 2022-01-31

## 2022-02-04 ENCOUNTER — TELEPHONE (OUTPATIENT)
Dept: INTERNAL MEDICINE | Facility: CLINIC | Age: 62
End: 2022-02-04

## 2022-02-04 NOTE — TELEPHONE ENCOUNTER
Caller: Thelma Michael    Relationship: Self    Best call back number:  [phone]  Requested Prescriptions:   Requested Prescriptions      No prescriptions requested or ordered in this encounter      Pharmacy where request should be sent:  [facility]     Additional details provided by patient: PATIENT NEEDS THE ONE TOUCH ULTRA 2 TESTING KIT     Does the patient have less than a 3 day supply:  [x] Yes  [] No    PATIENT HAS SOME QUESTIONS ABOUT THE FOLLOWING MEDICATIONS   SHE WANTS TO KNOW WHAT SHE IS TAKING THEM FOR     PANTOPRAZOLE   ferrous sulfate 325 (65 FE) MG tablet  megestrol (MEGACE) 40 MG tablet  MIRTAZAPNE 15 MG

## 2022-02-07 ENCOUNTER — TELEPHONE (OUTPATIENT)
Dept: INTERNAL MEDICINE | Facility: CLINIC | Age: 62
End: 2022-02-07

## 2022-02-07 RX ORDER — BLOOD-GLUCOSE METER
1 KIT MISCELLANEOUS 4 TIMES DAILY
Qty: 1 EACH | Refills: 0 | Status: SHIPPED | OUTPATIENT
Start: 2022-02-07

## 2022-02-07 RX ORDER — FERROUS SULFATE 325(65) MG
325 TABLET ORAL
Qty: 30 TABLET | Refills: 2 | Status: SHIPPED | OUTPATIENT
Start: 2022-02-07 | End: 2022-11-14

## 2022-02-07 NOTE — TELEPHONE ENCOUNTER
Caller: Thelma Michael    Relationship: Self    Best call back number:  [phone]    Requested Prescriptions:    Requested Prescriptions      No prescriptions requested or ordered in this encounter    IRON PILL  PATIENT DID NOT KNOW THE NAME OF THE MEDICATION AND SHE IS OUT     Pharmacy where request should be sent: [facility] [address] [place] [address] [phone] [facility] [phone] FX     Additional details provided by patient: ALSO PATIENT IS TAKING 4 MEDICATIONS AND SHE DOES NOT KNOW WHAT THEY ARE FOR   PLEASE ADVISE     megestrol (MEGACE) 40 MG tablet  ferrous sulfate 325 (65 FE) MG tablet  FLUoxetine (PROzac) 20 MG capsule  MIRTAZAPINE 15 MG        Does the patient have less than a 3 day supply:  [x] Yes  [] No

## 2022-02-10 ENCOUNTER — HOSPITAL ENCOUNTER (OUTPATIENT)
Facility: HOSPITAL | Age: 62
Setting detail: OBSERVATION
Discharge: HOME | End: 2022-02-13
Attending: STUDENT IN AN ORGANIZED HEALTH CARE EDUCATION/TRAINING PROGRAM | Admitting: INTERNAL MEDICINE

## 2022-02-10 ENCOUNTER — APPOINTMENT (OUTPATIENT)
Dept: GENERAL RADIOLOGY | Facility: HOSPITAL | Age: 62
End: 2022-02-10

## 2022-02-10 DIAGNOSIS — K31.84 GASTROPARESIS: ICD-10-CM

## 2022-02-10 DIAGNOSIS — I16.0 HYPERTENSIVE URGENCY: ICD-10-CM

## 2022-02-10 DIAGNOSIS — E86.0 DEHYDRATION: ICD-10-CM

## 2022-02-10 DIAGNOSIS — Z86.73 HISTORY OF STROKE: ICD-10-CM

## 2022-02-10 DIAGNOSIS — Z86.39 HISTORY OF HYPERLIPIDEMIA: ICD-10-CM

## 2022-02-10 DIAGNOSIS — R11.2 INTRACTABLE NAUSEA AND VOMITING: Primary | ICD-10-CM

## 2022-02-10 DIAGNOSIS — E10.65 UNCONTROLLED TYPE 1 DIABETES MELLITUS WITH HYPERGLYCEMIA: ICD-10-CM

## 2022-02-10 DIAGNOSIS — N28.9 ACUTE RENAL INSUFFICIENCY: ICD-10-CM

## 2022-02-10 DIAGNOSIS — R10.30 LOWER ABDOMINAL PAIN: ICD-10-CM

## 2022-02-10 DIAGNOSIS — R74.8 ELEVATED AMYLASE: ICD-10-CM

## 2022-02-10 PROCEDURE — 71045 X-RAY EXAM CHEST 1 VIEW: CPT

## 2022-02-10 PROCEDURE — 93005 ELECTROCARDIOGRAM TRACING: CPT

## 2022-02-10 PROCEDURE — 93005 ELECTROCARDIOGRAM TRACING: CPT | Performed by: STUDENT IN AN ORGANIZED HEALTH CARE EDUCATION/TRAINING PROGRAM

## 2022-02-10 RX ORDER — SODIUM CHLORIDE 0.9 % (FLUSH) 0.9 %
10 SYRINGE (ML) INJECTION AS NEEDED
Status: DISCONTINUED | OUTPATIENT
Start: 2022-02-10 | End: 2022-02-13 | Stop reason: HOSPADM

## 2022-02-11 ENCOUNTER — APPOINTMENT (OUTPATIENT)
Dept: CT IMAGING | Facility: HOSPITAL | Age: 62
End: 2022-02-11

## 2022-02-11 PROBLEM — E83.52 HYPERCALCEMIA: Status: ACTIVE | Noted: 2022-02-11

## 2022-02-11 PROBLEM — D72.829 LEUKOCYTOSIS: Status: ACTIVE | Noted: 2022-02-11

## 2022-02-11 PROBLEM — R11.2 NAUSEA VOMITING AND DIARRHEA: Status: ACTIVE | Noted: 2022-02-11

## 2022-02-11 PROBLEM — D75.839 THROMBOCYTOSIS: Status: ACTIVE | Noted: 2022-02-11

## 2022-02-11 PROBLEM — R19.7 NAUSEA VOMITING AND DIARRHEA: Status: ACTIVE | Noted: 2022-02-11

## 2022-02-11 PROBLEM — E86.0 DEHYDRATION: Status: ACTIVE | Noted: 2022-02-11

## 2022-02-11 PROBLEM — R79.89 ELEVATED SERUM CREATININE: Status: ACTIVE | Noted: 2022-02-11

## 2022-02-11 PROBLEM — E87.6 HYPOKALEMIA: Status: ACTIVE | Noted: 2022-02-11

## 2022-02-11 PROBLEM — R77.9 ELEVATED SERUM PROTEIN LEVEL: Status: ACTIVE | Noted: 2022-02-11

## 2022-02-11 LAB
ALBUMIN SERPL-MCNC: 4.2 G/DL (ref 3.5–5.2)
ALBUMIN SERPL-MCNC: 5.1 G/DL (ref 3.5–5.2)
ALBUMIN/GLOB SERPL: 1.3 G/DL
ALBUMIN/GLOB SERPL: 1.4 G/DL
ALP SERPL-CCNC: 117 U/L (ref 39–117)
ALP SERPL-CCNC: 91 U/L (ref 39–117)
ALT SERPL W P-5'-P-CCNC: 12 U/L (ref 1–33)
ALT SERPL W P-5'-P-CCNC: 9 U/L (ref 1–33)
AMPHET+METHAMPHET UR QL: NEGATIVE
AMPHETAMINES UR QL: NEGATIVE
AMYLASE SERPL-CCNC: 168 U/L (ref 28–100)
ANION GAP SERPL CALCULATED.3IONS-SCNC: 15 MMOL/L (ref 5–15)
ANION GAP SERPL CALCULATED.3IONS-SCNC: 17 MMOL/L (ref 5–15)
AST SERPL-CCNC: 14 U/L (ref 1–32)
AST SERPL-CCNC: 18 U/L (ref 1–32)
B-OH-BUTYR SERPL-SCNC: 1.18 MMOL/L (ref 0.02–0.27)
BACTERIA UR QL AUTO: ABNORMAL /HPF
BARBITURATES UR QL SCN: NEGATIVE
BASOPHILS # BLD AUTO: 0 10*3/MM3 (ref 0–0.2)
BASOPHILS # BLD AUTO: 0.01 10*3/MM3 (ref 0–0.2)
BASOPHILS NFR BLD AUTO: 0 % (ref 0–1.5)
BASOPHILS NFR BLD AUTO: 0.1 % (ref 0–1.5)
BENZODIAZ UR QL SCN: NEGATIVE
BILIRUB SERPL-MCNC: 0.6 MG/DL (ref 0–1.2)
BILIRUB SERPL-MCNC: 0.6 MG/DL (ref 0–1.2)
BILIRUB UR QL STRIP: NEGATIVE
BUN SERPL-MCNC: 20 MG/DL (ref 8–23)
BUN SERPL-MCNC: 21 MG/DL (ref 8–23)
BUN/CREAT SERPL: 18 (ref 7–25)
BUN/CREAT SERPL: 19.6 (ref 7–25)
BUPRENORPHINE SERPL-MCNC: NEGATIVE NG/ML
CA-I SERPL ISE-MCNC: 1.25 MMOL/L (ref 1.12–1.32)
CALCIUM SPEC-SCNC: 11.1 MG/DL (ref 8.6–10.5)
CALCIUM SPEC-SCNC: 9.5 MG/DL (ref 8.6–10.5)
CANNABINOIDS SERPL QL: POSITIVE
CHLORIDE SERPL-SCNC: 105 MMOL/L (ref 98–107)
CHLORIDE SERPL-SCNC: 97 MMOL/L (ref 98–107)
CLARITY UR: CLEAR
CO2 SERPL-SCNC: 23 MMOL/L (ref 22–29)
CO2 SERPL-SCNC: 27 MMOL/L (ref 22–29)
COCAINE UR QL: NEGATIVE
COLOR UR: YELLOW
CREAT SERPL-MCNC: 1.07 MG/DL (ref 0.57–1)
CREAT SERPL-MCNC: 1.11 MG/DL (ref 0.57–1)
CRP SERPL-MCNC: 0.34 MG/DL (ref 0–0.5)
D-LACTATE SERPL-SCNC: 1.4 MMOL/L (ref 0.5–2)
DEPRECATED RDW RBC AUTO: 39.4 FL (ref 37–54)
DEPRECATED RDW RBC AUTO: 40.7 FL (ref 37–54)
EOSINOPHIL # BLD AUTO: 0 10*3/MM3 (ref 0–0.4)
EOSINOPHIL # BLD AUTO: 0 10*3/MM3 (ref 0–0.4)
EOSINOPHIL NFR BLD AUTO: 0 % (ref 0.3–6.2)
EOSINOPHIL NFR BLD AUTO: 0 % (ref 0.3–6.2)
ERYTHROCYTE [DISTWIDTH] IN BLOOD BY AUTOMATED COUNT: 12.4 % (ref 12.3–15.4)
ERYTHROCYTE [DISTWIDTH] IN BLOOD BY AUTOMATED COUNT: 12.5 % (ref 12.3–15.4)
ERYTHROCYTE [SEDIMENTATION RATE] IN BLOOD: 27 MM/HR (ref 0–30)
FERRITIN SERPL-MCNC: 79.21 NG/ML (ref 13–150)
FLUAV SUBTYP SPEC NAA+PROBE: NOT DETECTED
FLUBV RNA ISLT QL NAA+PROBE: NOT DETECTED
GFR SERPL CREATININE-BSD FRML MDRD: 60 ML/MIN/1.73
GFR SERPL CREATININE-BSD FRML MDRD: 63 ML/MIN/1.73
GLOBULIN UR ELPH-MCNC: 3 GM/DL
GLOBULIN UR ELPH-MCNC: 3.9 GM/DL
GLUCOSE BLDC GLUCOMTR-MCNC: 139 MG/DL (ref 70–130)
GLUCOSE BLDC GLUCOMTR-MCNC: 206 MG/DL (ref 70–130)
GLUCOSE SERPL-MCNC: 133 MG/DL (ref 65–99)
GLUCOSE SERPL-MCNC: 147 MG/DL (ref 65–99)
GLUCOSE UR STRIP-MCNC: ABNORMAL MG/DL
HBA1C MFR BLD: 10.3 % (ref 4.8–5.6)
HCT VFR BLD AUTO: 40.6 % (ref 34–46.6)
HCT VFR BLD AUTO: 43.6 % (ref 34–46.6)
HGB BLD-MCNC: 13.8 G/DL (ref 12–15.9)
HGB BLD-MCNC: 15.2 G/DL (ref 12–15.9)
HGB UR QL STRIP.AUTO: ABNORMAL
HOLD SPECIMEN: NORMAL
HYALINE CASTS UR QL AUTO: ABNORMAL /LPF
IMM GRANULOCYTES # BLD AUTO: 0.02 10*3/MM3 (ref 0–0.05)
IMM GRANULOCYTES # BLD AUTO: 0.04 10*3/MM3 (ref 0–0.05)
IMM GRANULOCYTES NFR BLD AUTO: 0.2 % (ref 0–0.5)
IMM GRANULOCYTES NFR BLD AUTO: 0.4 % (ref 0–0.5)
IRON 24H UR-MRATE: 68 MCG/DL (ref 37–145)
IRON SATN MFR SERPL: 19 % (ref 20–50)
KETONES UR QL STRIP: ABNORMAL
LEUKOCYTE ESTERASE UR QL STRIP.AUTO: NEGATIVE
LIPASE SERPL-CCNC: 11 U/L (ref 13–60)
LYMPHOCYTES # BLD AUTO: 1.47 10*3/MM3 (ref 0.7–3.1)
LYMPHOCYTES # BLD AUTO: 2.2 10*3/MM3 (ref 0.7–3.1)
LYMPHOCYTES NFR BLD AUTO: 13.4 % (ref 19.6–45.3)
LYMPHOCYTES NFR BLD AUTO: 20.3 % (ref 19.6–45.3)
MAGNESIUM SERPL-MCNC: 1.9 MG/DL (ref 1.6–2.4)
MAGNESIUM SERPL-MCNC: 2.1 MG/DL (ref 1.6–2.4)
MCH RBC QN AUTO: 30 PG (ref 26.6–33)
MCH RBC QN AUTO: 30.3 PG (ref 26.6–33)
MCHC RBC AUTO-ENTMCNC: 34 G/DL (ref 31.5–35.7)
MCHC RBC AUTO-ENTMCNC: 34.9 G/DL (ref 31.5–35.7)
MCV RBC AUTO: 86 FL (ref 79–97)
MCV RBC AUTO: 89.2 FL (ref 79–97)
METHADONE UR QL SCN: NEGATIVE
MONOCYTES # BLD AUTO: 0.55 10*3/MM3 (ref 0.1–0.9)
MONOCYTES # BLD AUTO: 0.76 10*3/MM3 (ref 0.1–0.9)
MONOCYTES NFR BLD AUTO: 5 % (ref 5–12)
MONOCYTES NFR BLD AUTO: 7 % (ref 5–12)
NEUTROPHILS NFR BLD AUTO: 7.86 10*3/MM3 (ref 1.7–7)
NEUTROPHILS NFR BLD AUTO: 72.5 % (ref 42.7–76)
NEUTROPHILS NFR BLD AUTO: 8.88 10*3/MM3 (ref 1.7–7)
NEUTROPHILS NFR BLD AUTO: 81.1 % (ref 42.7–76)
NITRITE UR QL STRIP: NEGATIVE
NRBC BLD AUTO-RTO: 0 /100 WBC (ref 0–0.2)
NRBC BLD AUTO-RTO: 0 /100 WBC (ref 0–0.2)
OPIATES UR QL: NEGATIVE
OXYCODONE UR QL SCN: NEGATIVE
PCP UR QL SCN: NEGATIVE
PH UR STRIP.AUTO: 6.5 [PH] (ref 5–8)
PLATELET # BLD AUTO: 380 10*3/MM3 (ref 140–450)
PLATELET # BLD AUTO: 481 10*3/MM3 (ref 140–450)
PMV BLD AUTO: 10.1 FL (ref 6–12)
PMV BLD AUTO: 10.1 FL (ref 6–12)
POTASSIUM SERPL-SCNC: 2.8 MMOL/L (ref 3.5–5.2)
POTASSIUM SERPL-SCNC: 2.8 MMOL/L (ref 3.5–5.2)
POTASSIUM SERPL-SCNC: 3.5 MMOL/L (ref 3.5–5.2)
PROCALCITONIN SERPL-MCNC: 0.11 NG/ML (ref 0–0.25)
PROPOXYPH UR QL: NEGATIVE
PROT SERPL-MCNC: 7.2 G/DL (ref 6–8.5)
PROT SERPL-MCNC: 9 G/DL (ref 6–8.5)
PROT UR QL STRIP: ABNORMAL
RBC # BLD AUTO: 4.55 10*6/MM3 (ref 3.77–5.28)
RBC # BLD AUTO: 5.07 10*6/MM3 (ref 3.77–5.28)
RBC # UR STRIP: ABNORMAL /HPF
REF LAB TEST METHOD: ABNORMAL
SARS-COV-2 RNA PNL SPEC NAA+PROBE: NOT DETECTED
SODIUM SERPL-SCNC: 141 MMOL/L (ref 136–145)
SODIUM SERPL-SCNC: 143 MMOL/L (ref 136–145)
SP GR UR STRIP: 1.02 (ref 1–1.03)
SQUAMOUS #/AREA URNS HPF: ABNORMAL /HPF
TIBC SERPL-MCNC: 359 MCG/DL (ref 298–536)
TRANSFERRIN SERPL-MCNC: 241 MG/DL (ref 200–360)
TRICYCLICS UR QL SCN: NEGATIVE
TROPONIN T SERPL-MCNC: 0.01 NG/ML (ref 0–0.03)
UROBILINOGEN UR QL STRIP: ABNORMAL
WBC # UR STRIP: ABNORMAL /HPF
WBC NRBC COR # BLD: 10.84 10*3/MM3 (ref 3.4–10.8)
WBC NRBC COR # BLD: 10.95 10*3/MM3 (ref 3.4–10.8)
WHOLE BLOOD HOLD SPECIMEN: NORMAL
WHOLE BLOOD HOLD SPECIMEN: NORMAL
YEAST URNS QL MICRO: ABNORMAL /HPF

## 2022-02-11 PROCEDURE — 82330 ASSAY OF CALCIUM: CPT | Performed by: NURSE PRACTITIONER

## 2022-02-11 PROCEDURE — G0378 HOSPITAL OBSERVATION PER HR: HCPCS

## 2022-02-11 PROCEDURE — 25010000002 METOCLOPRAMIDE PER 10 MG: Performed by: INTERNAL MEDICINE

## 2022-02-11 PROCEDURE — 85025 COMPLETE CBC W/AUTO DIFF WBC: CPT | Performed by: STUDENT IN AN ORGANIZED HEALTH CARE EDUCATION/TRAINING PROGRAM

## 2022-02-11 PROCEDURE — 84466 ASSAY OF TRANSFERRIN: CPT | Performed by: NURSE PRACTITIONER

## 2022-02-11 PROCEDURE — 84145 PROCALCITONIN (PCT): CPT | Performed by: INTERNAL MEDICINE

## 2022-02-11 PROCEDURE — 25010000002 HEPARIN (PORCINE) PER 1000 UNITS: Performed by: NURSE PRACTITIONER

## 2022-02-11 PROCEDURE — 81001 URINALYSIS AUTO W/SCOPE: CPT | Performed by: STUDENT IN AN ORGANIZED HEALTH CARE EDUCATION/TRAINING PROGRAM

## 2022-02-11 PROCEDURE — 96375 TX/PRO/DX INJ NEW DRUG ADDON: CPT

## 2022-02-11 PROCEDURE — 87636 SARSCOV2 & INF A&B AMP PRB: CPT | Performed by: PHYSICIAN ASSISTANT

## 2022-02-11 PROCEDURE — 83540 ASSAY OF IRON: CPT | Performed by: NURSE PRACTITIONER

## 2022-02-11 PROCEDURE — 96361 HYDRATE IV INFUSION ADD-ON: CPT

## 2022-02-11 PROCEDURE — 83036 HEMOGLOBIN GLYCOSYLATED A1C: CPT | Performed by: NURSE PRACTITIONER

## 2022-02-11 PROCEDURE — 83605 ASSAY OF LACTIC ACID: CPT | Performed by: INTERNAL MEDICINE

## 2022-02-11 PROCEDURE — 99284 EMERGENCY DEPT VISIT MOD MDM: CPT

## 2022-02-11 PROCEDURE — 85652 RBC SED RATE AUTOMATED: CPT | Performed by: NURSE PRACTITIONER

## 2022-02-11 PROCEDURE — 83735 ASSAY OF MAGNESIUM: CPT | Performed by: STUDENT IN AN ORGANIZED HEALTH CARE EDUCATION/TRAINING PROGRAM

## 2022-02-11 PROCEDURE — 96366 THER/PROPH/DIAG IV INF ADDON: CPT

## 2022-02-11 PROCEDURE — 99214 OFFICE O/P EST MOD 30 MIN: CPT | Performed by: PHYSICIAN ASSISTANT

## 2022-02-11 PROCEDURE — 86140 C-REACTIVE PROTEIN: CPT | Performed by: NURSE PRACTITIONER

## 2022-02-11 PROCEDURE — 82728 ASSAY OF FERRITIN: CPT | Performed by: NURSE PRACTITIONER

## 2022-02-11 PROCEDURE — 83690 ASSAY OF LIPASE: CPT | Performed by: PHYSICIAN ASSISTANT

## 2022-02-11 PROCEDURE — 0 POTASSIUM CHLORIDE 10 MEQ/100ML SOLUTION: Performed by: NURSE PRACTITIONER

## 2022-02-11 PROCEDURE — 82962 GLUCOSE BLOOD TEST: CPT

## 2022-02-11 PROCEDURE — 80053 COMPREHEN METABOLIC PANEL: CPT | Performed by: STUDENT IN AN ORGANIZED HEALTH CARE EDUCATION/TRAINING PROGRAM

## 2022-02-11 PROCEDURE — 99219 PR INITIAL OBSERVATION CARE/DAY 50 MINUTES: CPT | Performed by: INTERNAL MEDICINE

## 2022-02-11 PROCEDURE — 25010000002 ONDANSETRON PER 1 MG: Performed by: NURSE PRACTITIONER

## 2022-02-11 PROCEDURE — 82150 ASSAY OF AMYLASE: CPT | Performed by: PHYSICIAN ASSISTANT

## 2022-02-11 PROCEDURE — 96376 TX/PRO/DX INJ SAME DRUG ADON: CPT

## 2022-02-11 PROCEDURE — 25010000002 HYDRALAZINE PER 20 MG: Performed by: NURSE PRACTITIONER

## 2022-02-11 PROCEDURE — 84132 ASSAY OF SERUM POTASSIUM: CPT | Performed by: INTERNAL MEDICINE

## 2022-02-11 PROCEDURE — 85025 COMPLETE CBC W/AUTO DIFF WBC: CPT | Performed by: NURSE PRACTITIONER

## 2022-02-11 PROCEDURE — 80053 COMPREHEN METABOLIC PANEL: CPT | Performed by: NURSE PRACTITIONER

## 2022-02-11 PROCEDURE — 96372 THER/PROPH/DIAG INJ SC/IM: CPT

## 2022-02-11 PROCEDURE — 74176 CT ABD & PELVIS W/O CONTRAST: CPT

## 2022-02-11 PROCEDURE — 84484 ASSAY OF TROPONIN QUANT: CPT | Performed by: STUDENT IN AN ORGANIZED HEALTH CARE EDUCATION/TRAINING PROGRAM

## 2022-02-11 PROCEDURE — 96365 THER/PROPH/DIAG IV INF INIT: CPT

## 2022-02-11 PROCEDURE — 25010000002 METOCLOPRAMIDE PER 10 MG: Performed by: PHYSICIAN ASSISTANT

## 2022-02-11 PROCEDURE — 0 POTASSIUM CHLORIDE 10 MEQ/100ML SOLUTION: Performed by: PHYSICIAN ASSISTANT

## 2022-02-11 PROCEDURE — 80306 DRUG TEST PRSMV INSTRMNT: CPT | Performed by: PHYSICIAN ASSISTANT

## 2022-02-11 PROCEDURE — 83735 ASSAY OF MAGNESIUM: CPT | Performed by: NURSE PRACTITIONER

## 2022-02-11 PROCEDURE — 82010 KETONE BODYS QUAN: CPT | Performed by: PHYSICIAN ASSISTANT

## 2022-02-11 RX ORDER — DONEPEZIL HYDROCHLORIDE 5 MG/1
5 TABLET, FILM COATED ORAL NIGHTLY
Status: DISCONTINUED | OUTPATIENT
Start: 2022-02-11 | End: 2022-02-13 | Stop reason: HOSPADM

## 2022-02-11 RX ORDER — MEGESTROL ACETATE 40 MG/1
40 TABLET ORAL DAILY
Status: DISCONTINUED | OUTPATIENT
Start: 2022-02-11 | End: 2022-02-13 | Stop reason: HOSPADM

## 2022-02-11 RX ORDER — DEXTROSE MONOHYDRATE 25 G/50ML
25 INJECTION, SOLUTION INTRAVENOUS
Status: DISCONTINUED | OUTPATIENT
Start: 2022-02-11 | End: 2022-02-11 | Stop reason: SDUPTHER

## 2022-02-11 RX ORDER — ALBUTEROL SULFATE 90 UG/1
2 AEROSOL, METERED RESPIRATORY (INHALATION) EVERY 4 HOURS PRN
Status: DISCONTINUED | OUTPATIENT
Start: 2022-02-11 | End: 2022-02-13 | Stop reason: HOSPADM

## 2022-02-11 RX ORDER — CALCIUM CARBONATE 200(500)MG
2 TABLET,CHEWABLE ORAL 3 TIMES DAILY PRN
Status: DISCONTINUED | OUTPATIENT
Start: 2022-02-11 | End: 2022-02-13 | Stop reason: HOSPADM

## 2022-02-11 RX ORDER — NICOTINE POLACRILEX 4 MG
15 LOZENGE BUCCAL
Status: DISCONTINUED | OUTPATIENT
Start: 2022-02-11 | End: 2022-02-13 | Stop reason: HOSPADM

## 2022-02-11 RX ORDER — PANTOPRAZOLE SODIUM 40 MG/10ML
40 INJECTION, POWDER, LYOPHILIZED, FOR SOLUTION INTRAVENOUS
Status: DISCONTINUED | OUTPATIENT
Start: 2022-02-11 | End: 2022-02-11

## 2022-02-11 RX ORDER — ATORVASTATIN CALCIUM 40 MG/1
80 TABLET, FILM COATED ORAL NIGHTLY
Status: DISCONTINUED | OUTPATIENT
Start: 2022-02-11 | End: 2022-02-13 | Stop reason: HOSPADM

## 2022-02-11 RX ORDER — CHOLECALCIFEROL (VITAMIN D3) 125 MCG
5 CAPSULE ORAL NIGHTLY PRN
Status: DISCONTINUED | OUTPATIENT
Start: 2022-02-11 | End: 2022-02-13 | Stop reason: HOSPADM

## 2022-02-11 RX ORDER — GUAR GUM
1 PACKET (EA) ORAL DAILY
Status: DISCONTINUED | OUTPATIENT
Start: 2022-02-11 | End: 2022-02-12

## 2022-02-11 RX ORDER — PANTOPRAZOLE SODIUM 40 MG/10ML
40 INJECTION, POWDER, LYOPHILIZED, FOR SOLUTION INTRAVENOUS ONCE
Status: COMPLETED | OUTPATIENT
Start: 2022-02-11 | End: 2022-02-11

## 2022-02-11 RX ORDER — FLUOXETINE HYDROCHLORIDE 20 MG/1
20 CAPSULE ORAL DAILY
Status: DISCONTINUED | OUTPATIENT
Start: 2022-02-11 | End: 2022-02-13 | Stop reason: HOSPADM

## 2022-02-11 RX ORDER — ACETAMINOPHEN 160 MG/5ML
650 SOLUTION ORAL EVERY 4 HOURS PRN
Status: DISCONTINUED | OUTPATIENT
Start: 2022-02-11 | End: 2022-02-13 | Stop reason: HOSPADM

## 2022-02-11 RX ORDER — POTASSIUM CHLORIDE 7.45 MG/ML
10 INJECTION INTRAVENOUS
Status: DISCONTINUED | OUTPATIENT
Start: 2022-02-11 | End: 2022-02-13 | Stop reason: HOSPADM

## 2022-02-11 RX ORDER — CLONIDINE HYDROCHLORIDE 0.1 MG/1
0.1 TABLET ORAL EVERY 8 HOURS PRN
Status: DISCONTINUED | OUTPATIENT
Start: 2022-02-11 | End: 2022-02-13 | Stop reason: HOSPADM

## 2022-02-11 RX ORDER — ACETAMINOPHEN 650 MG/1
650 SUPPOSITORY RECTAL EVERY 4 HOURS PRN
Status: DISCONTINUED | OUTPATIENT
Start: 2022-02-11 | End: 2022-02-13 | Stop reason: HOSPADM

## 2022-02-11 RX ORDER — SODIUM CHLORIDE, SODIUM LACTATE, POTASSIUM CHLORIDE, CALCIUM CHLORIDE 600; 310; 30; 20 MG/100ML; MG/100ML; MG/100ML; MG/100ML
100 INJECTION, SOLUTION INTRAVENOUS CONTINUOUS
Status: DISCONTINUED | OUTPATIENT
Start: 2022-02-11 | End: 2022-02-11

## 2022-02-11 RX ORDER — ASCORBIC ACID 500 MG
500 TABLET ORAL DAILY
Status: DISCONTINUED | OUTPATIENT
Start: 2022-02-11 | End: 2022-02-13 | Stop reason: HOSPADM

## 2022-02-11 RX ORDER — POTASSIUM CHLORIDE 1.5 G/1.77G
40 POWDER, FOR SOLUTION ORAL AS NEEDED
Status: DISCONTINUED | OUTPATIENT
Start: 2022-02-11 | End: 2022-02-13 | Stop reason: HOSPADM

## 2022-02-11 RX ORDER — ACETAMINOPHEN 325 MG/1
650 TABLET ORAL EVERY 4 HOURS PRN
Status: DISCONTINUED | OUTPATIENT
Start: 2022-02-11 | End: 2022-02-13 | Stop reason: HOSPADM

## 2022-02-11 RX ORDER — ONDANSETRON 2 MG/ML
4 INJECTION INTRAMUSCULAR; INTRAVENOUS EVERY 6 HOURS PRN
Status: DISCONTINUED | OUTPATIENT
Start: 2022-02-11 | End: 2022-02-13 | Stop reason: HOSPADM

## 2022-02-11 RX ORDER — ONDANSETRON 4 MG/1
4 TABLET, FILM COATED ORAL EVERY 6 HOURS PRN
Status: DISCONTINUED | OUTPATIENT
Start: 2022-02-11 | End: 2022-02-13 | Stop reason: HOSPADM

## 2022-02-11 RX ORDER — POTASSIUM CHLORIDE 750 MG/1
40 CAPSULE, EXTENDED RELEASE ORAL AS NEEDED
Status: DISCONTINUED | OUTPATIENT
Start: 2022-02-11 | End: 2022-02-13 | Stop reason: HOSPADM

## 2022-02-11 RX ORDER — SODIUM CHLORIDE, SODIUM LACTATE, POTASSIUM CHLORIDE, CALCIUM CHLORIDE 600; 310; 30; 20 MG/100ML; MG/100ML; MG/100ML; MG/100ML
100 INJECTION, SOLUTION INTRAVENOUS CONTINUOUS
Status: ACTIVE | OUTPATIENT
Start: 2022-02-11 | End: 2022-02-12

## 2022-02-11 RX ORDER — METOCLOPRAMIDE HYDROCHLORIDE 5 MG/ML
5 INJECTION INTRAMUSCULAR; INTRAVENOUS EVERY 6 HOURS PRN
Status: DISCONTINUED | OUTPATIENT
Start: 2022-02-11 | End: 2022-02-11

## 2022-02-11 RX ORDER — AMLODIPINE BESYLATE 10 MG/1
10 TABLET ORAL DAILY
Status: DISCONTINUED | OUTPATIENT
Start: 2022-02-11 | End: 2022-02-13 | Stop reason: HOSPADM

## 2022-02-11 RX ORDER — ASPIRIN 81 MG/1
81 TABLET ORAL DAILY
Status: DISCONTINUED | OUTPATIENT
Start: 2022-02-11 | End: 2022-02-13 | Stop reason: HOSPADM

## 2022-02-11 RX ORDER — NICOTINE POLACRILEX 4 MG
15 LOZENGE BUCCAL
Status: DISCONTINUED | OUTPATIENT
Start: 2022-02-11 | End: 2022-02-11 | Stop reason: SDUPTHER

## 2022-02-11 RX ORDER — METOCLOPRAMIDE HYDROCHLORIDE 5 MG/ML
10 INJECTION INTRAMUSCULAR; INTRAVENOUS ONCE
Status: COMPLETED | OUTPATIENT
Start: 2022-02-11 | End: 2022-02-11

## 2022-02-11 RX ORDER — HEPARIN SODIUM 5000 [USP'U]/ML
5000 INJECTION, SOLUTION INTRAVENOUS; SUBCUTANEOUS EVERY 12 HOURS SCHEDULED
Status: DISCONTINUED | OUTPATIENT
Start: 2022-02-11 | End: 2022-02-13 | Stop reason: HOSPADM

## 2022-02-11 RX ORDER — CLONIDINE HYDROCHLORIDE 0.2 MG/1
0.2 TABLET ORAL EVERY 8 HOURS PRN
Status: DISCONTINUED | OUTPATIENT
Start: 2022-02-11 | End: 2022-02-11

## 2022-02-11 RX ORDER — MULTIPLE VITAMINS W/ MINERALS TAB 9MG-400MCG
1 TAB ORAL DAILY
Status: DISCONTINUED | OUTPATIENT
Start: 2022-02-11 | End: 2022-02-13 | Stop reason: HOSPADM

## 2022-02-11 RX ORDER — METOCLOPRAMIDE HYDROCHLORIDE 5 MG/ML
10 INJECTION INTRAMUSCULAR; INTRAVENOUS EVERY 6 HOURS PRN
Status: DISCONTINUED | OUTPATIENT
Start: 2022-02-11 | End: 2022-02-13 | Stop reason: HOSPADM

## 2022-02-11 RX ORDER — HYDRALAZINE HYDROCHLORIDE 20 MG/ML
10 INJECTION INTRAMUSCULAR; INTRAVENOUS ONCE
Status: COMPLETED | OUTPATIENT
Start: 2022-02-11 | End: 2022-02-11

## 2022-02-11 RX ORDER — METOCLOPRAMIDE HYDROCHLORIDE 5 MG/ML
5 INJECTION INTRAMUSCULAR; INTRAVENOUS 3 TIMES DAILY
Status: DISCONTINUED | OUTPATIENT
Start: 2022-02-11 | End: 2022-02-13 | Stop reason: HOSPADM

## 2022-02-11 RX ORDER — PANTOPRAZOLE SODIUM 40 MG/10ML
40 INJECTION, POWDER, LYOPHILIZED, FOR SOLUTION INTRAVENOUS
Status: DISCONTINUED | OUTPATIENT
Start: 2022-02-11 | End: 2022-02-13 | Stop reason: HOSPADM

## 2022-02-11 RX ORDER — DEXTROSE MONOHYDRATE 25 G/50ML
25 INJECTION, SOLUTION INTRAVENOUS
Status: DISCONTINUED | OUTPATIENT
Start: 2022-02-11 | End: 2022-02-13 | Stop reason: HOSPADM

## 2022-02-11 RX ORDER — TERAZOSIN 1 MG/1
1 CAPSULE ORAL NIGHTLY
Status: DISCONTINUED | OUTPATIENT
Start: 2022-02-11 | End: 2022-02-13 | Stop reason: HOSPADM

## 2022-02-11 RX ORDER — FERROUS SULFATE 325(65) MG
325 TABLET ORAL
Status: DISCONTINUED | OUTPATIENT
Start: 2022-02-11 | End: 2022-02-11

## 2022-02-11 RX ORDER — TRAMADOL HYDROCHLORIDE 50 MG/1
50 TABLET ORAL EVERY 6 HOURS PRN
Status: DISCONTINUED | OUTPATIENT
Start: 2022-02-11 | End: 2022-02-13 | Stop reason: HOSPADM

## 2022-02-11 RX ORDER — GABAPENTIN 400 MG/1
400 CAPSULE ORAL 2 TIMES DAILY PRN
Status: DISCONTINUED | OUTPATIENT
Start: 2022-02-11 | End: 2022-02-13 | Stop reason: HOSPADM

## 2022-02-11 RX ORDER — POTASSIUM CHLORIDE 7.45 MG/ML
10 INJECTION INTRAVENOUS
Status: DISCONTINUED | OUTPATIENT
Start: 2022-02-11 | End: 2022-02-11

## 2022-02-11 RX ADMIN — HYDRALAZINE HYDROCHLORIDE 10 MG: 20 INJECTION INTRAMUSCULAR; INTRAVENOUS at 05:33

## 2022-02-11 RX ADMIN — ASPIRIN 81 MG: 81 TABLET, COATED ORAL at 09:12

## 2022-02-11 RX ADMIN — METOCLOPRAMIDE 5 MG: 5 INJECTION, SOLUTION INTRAMUSCULAR; INTRAVENOUS at 17:08

## 2022-02-11 RX ADMIN — FLUOXETINE HYDROCHLORIDE 20 MG: 20 CAPSULE ORAL at 09:14

## 2022-02-11 RX ADMIN — HEPARIN SODIUM 5000 UNITS: 5000 INJECTION, SOLUTION INTRAVENOUS; SUBCUTANEOUS at 20:10

## 2022-02-11 RX ADMIN — METOCLOPRAMIDE 10 MG: 5 INJECTION, SOLUTION INTRAMUSCULAR; INTRAVENOUS at 14:08

## 2022-02-11 RX ADMIN — SODIUM CHLORIDE, POTASSIUM CHLORIDE, SODIUM LACTATE AND CALCIUM CHLORIDE 100 ML/HR: 600; 310; 30; 20 INJECTION, SOLUTION INTRAVENOUS at 20:10

## 2022-02-11 RX ADMIN — DONEPEZIL HYDROCHLORIDE 5 MG: 5 TABLET, FILM COATED ORAL at 20:10

## 2022-02-11 RX ADMIN — CLONIDINE HYDROCHLORIDE 0.2 MG: 0.2 TABLET ORAL at 17:08

## 2022-02-11 RX ADMIN — SODIUM CHLORIDE, POTASSIUM CHLORIDE, SODIUM LACTATE AND CALCIUM CHLORIDE 100 ML/HR: 600; 310; 30; 20 INJECTION, SOLUTION INTRAVENOUS at 07:17

## 2022-02-11 RX ADMIN — PANTOPRAZOLE SODIUM 40 MG: 40 INJECTION, POWDER, FOR SOLUTION INTRAVENOUS at 05:35

## 2022-02-11 RX ADMIN — POTASSIUM CHLORIDE 10 MEQ: 10 INJECTION, SOLUTION INTRAVENOUS at 09:04

## 2022-02-11 RX ADMIN — POTASSIUM CHLORIDE 10 MEQ: 7.46 INJECTION, SOLUTION INTRAVENOUS at 05:25

## 2022-02-11 RX ADMIN — HEPARIN SODIUM 5000 UNITS: 5000 INJECTION, SOLUTION INTRAVENOUS; SUBCUTANEOUS at 09:10

## 2022-02-11 RX ADMIN — POTASSIUM CHLORIDE 10 MEQ: 10 INJECTION, SOLUTION INTRAVENOUS at 12:18

## 2022-02-11 RX ADMIN — Medication 1 TABLET: at 09:15

## 2022-02-11 RX ADMIN — TERAZOSIN HYDROCHLORIDE 1 MG: 1 CAPSULE ORAL at 20:09

## 2022-02-11 RX ADMIN — MEGESTROL ACETATE 40 MG: 40 TABLET ORAL at 09:13

## 2022-02-11 RX ADMIN — POTASSIUM CHLORIDE 10 MEQ: 10 INJECTION, SOLUTION INTRAVENOUS at 21:45

## 2022-02-11 RX ADMIN — AMLODIPINE BESYLATE 10 MG: 10 TABLET ORAL at 09:19

## 2022-02-11 RX ADMIN — SODIUM CHLORIDE 2000 ML: 9 INJECTION, SOLUTION INTRAVENOUS at 03:12

## 2022-02-11 RX ADMIN — PANTOPRAZOLE SODIUM 40 MG: 40 INJECTION, POWDER, FOR SOLUTION INTRAVENOUS at 17:09

## 2022-02-11 RX ADMIN — ATORVASTATIN CALCIUM 80 MG: 40 TABLET, FILM COATED ORAL at 20:09

## 2022-02-11 RX ADMIN — POTASSIUM CHLORIDE 10 MEQ: 10 INJECTION, SOLUTION INTRAVENOUS at 06:58

## 2022-02-11 RX ADMIN — METOCLOPRAMIDE 5 MG: 5 INJECTION, SOLUTION INTRAMUSCULAR; INTRAVENOUS at 20:10

## 2022-02-11 RX ADMIN — OXYCODONE HYDROCHLORIDE AND ACETAMINOPHEN 500 MG: 500 TABLET ORAL at 09:14

## 2022-02-11 RX ADMIN — ONDANSETRON 4 MG: 2 INJECTION INTRAMUSCULAR; INTRAVENOUS at 09:07

## 2022-02-11 RX ADMIN — METOCLOPRAMIDE 10 MG: 5 INJECTION, SOLUTION INTRAMUSCULAR; INTRAVENOUS at 03:11

## 2022-02-11 RX ADMIN — POTASSIUM CHLORIDE 10 MEQ: 10 INJECTION, SOLUTION INTRAVENOUS at 20:10

## 2022-02-11 RX ADMIN — PANTOPRAZOLE SODIUM 40 MG: 40 INJECTION, POWDER, FOR SOLUTION INTRAVENOUS at 03:11

## 2022-02-11 RX ADMIN — POTASSIUM CHLORIDE 10 MEQ: 10 INJECTION, SOLUTION INTRAVENOUS at 23:20

## 2022-02-11 RX ADMIN — SODIUM CHLORIDE, POTASSIUM CHLORIDE, SODIUM LACTATE AND CALCIUM CHLORIDE 100 ML/HR: 600; 310; 30; 20 INJECTION, SOLUTION INTRAVENOUS at 09:02

## 2022-02-11 RX ADMIN — SODIUM CHLORIDE, PRESERVATIVE FREE 10 ML: 5 INJECTION INTRAVENOUS at 03:11

## 2022-02-11 NOTE — ED PROVIDER NOTES
"Subjective   This is a 61-year-old female that presents the ER with 2-day history of intractable nausea with vomiting and some lower abdominal discomfort.  Past medical history is significant for type 1 diabetes mellitus poorly controlled with insulin pump, GERD, hypertension, hyperlipidemia, migraines, stroke without residual deficits, former smoking history, and diagnosis of gastroparesis in 2021.  Patient is followed by Magali Pandey, nurse practitioner at [facility].  Gastric emptying study from July, 2021 revealed findings consistent with delayed solid phase gastric emptying with only approximately 25% of gastric emptying at 90 minutes.  Patient says that she will have \"flareups\" of gastroparesis every few months.  After reviewing GIs previous office note from October, 2021, patient had EGD which revealed significant retained food in the duodenum and tortuous esophagus.  Colonoscopy revealed several colon polyps which were removed.  Both EGD and colonoscopy were performed 7/27/2021.  PillCam showed no evidence of bleeding from 10/15/2021.  Patient reported nausea few times a week and this typically happens when she needs to eat rather than after meals.  She has frequent anorexia.  Patient was prescribed Megace, but patient admitted that she was not taking it routinely.  She has been taking lara root with each meal and she feels that this has been helping with postprandial nausea or bloating.  Patient drinks diabetic nutritional supplements a few times a week.  Patient has been taking Protonix for GERD.  She reports a loose stool earlier this morning but says it was not much because she is not eating much.  She denies fever or chills.  She reports decreased urinary output.  She reports that she has vomited at least 8 times in the last 48 hours and is unable to keep any p.o. or fluids down.  She says her last Accu-Chek at home was in the 140s.  Patient has her insulin pump and has been adjusting " her insulin accordingly.  Patient reports some dizziness with standing due to dehydration but denies any near-syncope or syncope.  No other concerns at this time.  Patient denies any marijuana use.      History provided by:  Patient  GI Problem  The primary symptoms include abdominal pain (Bilateral lower abdominal cramping), nausea, vomiting (Emesis x8) and diarrhea (1 loose bowel movement earlier today). Primary symptoms do not include fever, fatigue, melena, hematemesis, hematochezia, dysuria, myalgias or arthralgias. The illness began 2 days ago. The problem has not changed since onset.  The illness is also significant for anorexia. The illness does not include dysphagia, bloating, constipation or back pain. Associated medical issues comments: History of gastroparesis and type 1 diabetes mellitus.  Patient followed by ONDINA Cabezas here at [facility]..       Review of Systems   Constitutional: Positive for activity change and appetite change. Negative for fatigue and fever.   HENT: Negative.    Respiratory: Negative.  Negative for cough and shortness of breath.    Cardiovascular: Negative.  Negative for chest pain.   Gastrointestinal: Positive for abdominal pain (Bilateral lower abdominal cramping), anorexia, diarrhea (1 loose bowel movement earlier today), nausea and vomiting (Emesis x8). Negative for abdominal distention, bloating, blood in stool, constipation, dysphagia, hematemesis, hematochezia and melena.        History of gastroparesis with intermittent flareups.   Endocrine:        Type 1 diabetes mellitus with insulin pump.  Hemoglobin A1c is 13.4% in January, 2022.  Diabetes is uncontrolled.   Genitourinary: Positive for decreased urine volume. Negative for dysuria, flank pain, frequency and urgency.   Musculoskeletal: Negative for arthralgias, back pain and myalgias.   Neurological: Positive for dizziness (Dizziness with standing.  No near syncope/syncope.) and weakness (Generally  weak). Negative for syncope.   All other systems reviewed and are negative.      Past Medical History:   Diagnosis Date   • Acid reflux    • Acute bronchitis    • Cardiac murmur    • Diabetes mellitus (HCC)    • H/O echocardiogram 08/07/2012    i. LVEF 65%.ii. Mild LVH.iii. Borderline evidence of atrial septal aneurysm.  No PFO.    • History of nuclear stress test 08/22/2014    Negative for ischemia and scars; LVEF 77%.     • Hyperlipidemia    • Hypertension    • Impacted cerumen of both ears    • Migraine    • Self-catheterizes urinary bladder    • Sinusitis    • Stroke (HCC)    • Tobacco abuse     quit 4 days ago.     • Urticaria        No Known Allergies    Past Surgical History:   Procedure Laterality Date   • CAPSULE ENDOSCOPY  7/27/2021    Procedure: PILLCAM DEPLOYMENT;  Surgeon: Mikael Worthy MD;  Location:  [facility];  Service: Gastroenterology;;   • COLONOSCOPY     • COLONOSCOPY N/A 7/27/2021    Procedure: COLONOSCOPY;  Surgeon: Mikael Worthy MD;  Location:  [facility];  Service: Gastroenterology;  Laterality: N/A;   • DENTAL PROCEDURE     • ENDOSCOPY N/A 6/30/2021    Procedure: ESOPHAGOGASTRODUODENOSCOPY;  Surgeon: Brunner, Mark I, MD;  Location:  [facility];  Service: Gastroenterology;  Laterality: N/A;   • ENDOSCOPY N/A 7/27/2021    Procedure: ESOPHAGOGASTRODUODENOSCOPY;  Surgeon: Mikael Worthy MD;  Location:  [facility];  Service: Gastroenterology;  Laterality: N/A;   • NO PAST SURGERIES         Family History   Problem Relation Age of Onset   • Anxiety disorder Other    • Arthritis Other    • ADD / ADHD Other    • Heart disease Other         cardiac disorder   • Depression Other    • Diabetes Other    • Hyperlipidemia Other    • Hypertension Other    • Lung cancer Other    • Osteoporosis Other    • Hypertension Brother    • Diabetes Brother    • Hyperlipidemia Mother    • Hypertension Mother    • Colon cancer Neg Hx        Social History     Socioeconomic  History   • Marital status:    Tobacco Use   • Smoking status: Former Smoker     Quit date: 2019     Years since quittin.7   • Smokeless tobacco: Never Used   • Tobacco comment: BC PL never smoker    Vaping Use   • Vaping Use: Never used   Substance and Sexual Activity   • Alcohol use: Yes     Alcohol/week: 1.0 standard drink     Types: 1 Glasses of wine per week     Comment: ocassional   • Drug use: No   • Sexual activity: Defer     Comment: Single            Objective   Physical Exam  Vitals and nursing note reviewed.   Constitutional:       Appearance: Normal appearance.      Comments: Thin cachectic female.  Patient appears malnourished.   HENT:      Head: Normocephalic and atraumatic.      Right Ear: Tympanic membrane normal.      Left Ear: Tympanic membrane normal.      Nose: Nose normal.      Mouth/Throat:      Mouth: Mucous membranes are dry.      Pharynx: Oropharynx is clear.      Comments: Oral mucous membranes are dry  Eyes:      Extraocular Movements: Extraocular movements intact.      Conjunctiva/sclera: Conjunctivae normal.      Pupils: Pupils are equal, round, and reactive to light.   Cardiovascular:      Rate and Rhythm: Normal rate and regular rhythm.  No extrasystoles are present.     Pulses: Normal pulses.      Heart sounds: Normal heart sounds.      Comments: Regular rate and rhythm.  No ectopy.  Pulmonary:      Effort: Pulmonary effort is normal. No tachypnea or accessory muscle usage.      Breath sounds: Normal breath sounds. No decreased breath sounds.      Comments: Lungs are clear to auscultation bilaterally.  No tachypnea or accessory muscle use.  Abdominal:      General: Bowel sounds are normal. There is no distension.      Palpations: Abdomen is soft.      Tenderness: There is abdominal tenderness in the right lower quadrant and left lower quadrant. There is no right CVA tenderness, left CVA tenderness, guarding or rebound.      Comments: Abdomen soft without distention.   Active bowel sounds in all 4 quadrants.  Mild bilateral lower quadrant tenderness to palpation.  No rebound or guarding.  No flank or CVA tenderness.  Abdominal exam is benign and nonsurgical.   Musculoskeletal:         General: Normal range of motion.      Cervical back: Normal range of motion and neck supple.      Right lower leg: No edema.      Left lower leg: No edema.   Skin:     General: Skin is warm and dry.      Comments: Poor skin turgor.  Patient appears dehydrated.   Neurological:      General: No focal deficit present.      Mental Status: She is alert.      Cranial Nerves: Cranial nerves are intact.      Sensory: Sensation is intact.      Motor: Motor function is intact.      Coordination: Coordination is intact.      Comments: Generally weak with overall functional decline.  No focal deficits.         Procedures           ED Course  ED Course as of 02/11/22 0600   Fri Feb 11, 2022   0551 CBC reveals white blood cell count of 10.95.  Platelet count is 481,000.  Differential shows 81% neutrophils.  Chemistries revealed serum glucose 147, creatinine was 1.11.  Potassium is 2.8.  Magnesium level is 2.1.  Serum ketones were elevated at 1.184.  Lipase is 11 and amylase was elevated at 168.  Lactic acid is 1.4.  Troponin was 0.012.  COVID-19 and influenza testing were negative.  Chest x-ray reveals no acute cardiopulmonary process.CT of the abdomen/pelvis without contrast reveals no evidence of acute disease in the abdomen or pelvis.  Pancreas appeared within normal limits.  Bilateral adrenal adenomas.  Scattered colonic diverticula without evidence of diverticulitis. [FC]   0555 Patient given aggressive IV fluids 2 L of normal saline, as well as Protonix 40 mg IV, and we also ordered 3 runs of KCl 10 mEq per 100 mL.  I also ordered Reglan 10 mg IV.  Patient feels some better upon reassessment.  I discussed admission with Dr. Max, hospitalist, due to intractable nausea/vomiting, gastroparesis, and  dehydration with electrolyte abnormalities.  She was agreeable to admission on telemetry.  She also ordered hydralazine 10 mg IV due to hypertensive urgency.  I updated patient on all results and need for admission.  She is agreeable.  GI will also be consulted. [FC]   0556 Anion gap was slightly high at 17. [FC]      ED Course User Index  [FC] Diana Calderón, CHRIS           Recent Results (from the past 24 hour(s))   Comprehensive Metabolic Panel    Collection Time: 02/11/22 12:51 AM    Specimen: Blood   Result Value Ref Range    Glucose 147 (H) 65 - 99 mg/dL    BUN 20 8 - 23 mg/dL    Creatinine 1.11 (H) 0.57 - 1.00 mg/dL    Sodium 141 136 - 145 mmol/L    Potassium 2.8 (L) 3.5 - 5.2 mmol/L    Chloride 97 (L) 98 - 107 mmol/L    CO2 27.0 22.0 - 29.0 mmol/L    Calcium 11.1 (H) 8.6 - 10.5 mg/dL    Total Protein 9.0 (H) 6.0 - 8.5 g/dL    Albumin 5.10 3.50 - 5.20 g/dL    ALT (SGPT) 12 1 - 33 U/L    AST (SGOT) 18 1 - 32 U/L    Alkaline Phosphatase 117 39 - 117 U/L    Total Bilirubin 0.6 0.0 - 1.2 mg/dL    eGFR  African Amer 60 (L) >60 mL/min/1.73    Globulin 3.9 gm/dL    A/G Ratio 1.3 g/dL    BUN/Creatinine Ratio 18.0 7.0 - 25.0    Anion Gap 17.0 (H) 5.0 - 15.0 mmol/L   Troponin    Collection Time: 02/11/22 12:51 AM    Specimen: Blood   Result Value Ref Range    Troponin T 0.012 0.000 - 0.030 ng/mL   Magnesium    Collection Time: 02/11/22 12:51 AM    Specimen: Blood   Result Value Ref Range    Magnesium 2.1 1.6 - 2.4 mg/dL   Green Top (Gel)    Collection Time: 02/11/22 12:51 AM   Result Value Ref Range    Extra Tube Hold for add-ons.    Lavender Top    Collection Time: 02/11/22 12:51 AM   Result Value Ref Range    Extra Tube hold for add-on    Gold Top - SST    Collection Time: 02/11/22 12:51 AM   Result Value Ref Range    Extra Tube Hold for add-ons.    Gray Top    Collection Time: 02/11/22 12:51 AM   Result Value Ref Range    Extra Tube Hold for add-ons.    Light Blue Top    Collection Time: 02/11/22 12:51 AM   Result  Value Ref Range    Extra Tube hold for add-on    CBC Auto Differential    Collection Time: 02/11/22 12:51 AM    Specimen: Blood   Result Value Ref Range    WBC 10.95 (H) 3.40 - 10.80 10*3/mm3    RBC 5.07 3.77 - 5.28 10*6/mm3    Hemoglobin 15.2 12.0 - 15.9 g/dL    Hematocrit 43.6 34.0 - 46.6 %    MCV 86.0 79.0 - 97.0 fL    MCH 30.0 26.6 - 33.0 pg    MCHC 34.9 31.5 - 35.7 g/dL    RDW 12.5 12.3 - 15.4 %    RDW-SD 39.4 37.0 - 54.0 fl    MPV 10.1 6.0 - 12.0 fL    Platelets 481 (H) 140 - 450 10*3/mm3    Neutrophil % 81.1 (H) 42.7 - 76.0 %    Lymphocyte % 13.4 (L) 19.6 - 45.3 %    Monocyte % 5.0 5.0 - 12.0 %    Eosinophil % 0.0 (L) 0.3 - 6.2 %    Basophil % 0.1 0.0 - 1.5 %    Immature Grans % 0.4 0.0 - 0.5 %    Neutrophils, Absolute 8.88 (H) 1.70 - 7.00 10*3/mm3    Lymphocytes, Absolute 1.47 0.70 - 3.10 10*3/mm3    Monocytes, Absolute 0.55 0.10 - 0.90 10*3/mm3    Eosinophils, Absolute 0.00 0.00 - 0.40 10*3/mm3    Basophils, Absolute 0.01 0.00 - 0.20 10*3/mm3    Immature Grans, Absolute 0.04 0.00 - 0.05 10*3/mm3    nRBC 0.0 0.0 - 0.2 /100 WBC   Lipase    Collection Time: 02/11/22 12:51 AM    Specimen: Blood   Result Value Ref Range    Lipase 11 (L) 13 - 60 U/L   Amylase    Collection Time: 02/11/22 12:51 AM    Specimen: Blood   Result Value Ref Range    Amylase 168 (H) 28 - 100 U/L   Beta Hydroxybutyrate Quantitative    Collection Time: 02/11/22 12:51 AM    Specimen: Blood   Result Value Ref Range    Beta-Hydroxybutyrate Quant 1.184 (H) 0.020 - 0.270 mmol/L   COVID-19 and FLU A/B PCR - Swab, Nasopharynx    Collection Time: 02/11/22 12:51 AM    Specimen: Nasopharynx; Swab   Result Value Ref Range    COVID19 Not Detected Not Detected - Ref. Range    Influenza A PCR Not Detected Not Detected    Influenza B PCR Not Detected Not Detected   Lactic Acid, Plasma    Collection Time: 02/11/22  4:01 AM    Specimen: Blood   Result Value Ref Range    Lactate 1.4 0.5 - 2.0 mmol/L     Note: In addition to lab results from this visit, the  "labs listed above may include labs taken at another facility or during a different encounter within the last 24 hours. Please correlate lab times with ED admission and discharge times for further clarification of the services performed during this visit.    CT Abdomen Pelvis Without Contrast   Final Result   1. No evidence of acute disease in the abdomen or pelvis.   2. Scattered colonic diverticula.   3. Bilateral adrenal adenomas.   4. Additional chronic findings as above.      Electronically signed by:  Yg Manzo M.D.     2/10/2022 11:23 PM Mountain Time      XR Chest 1 View   Final Result   Impression:   No evidence of an acute pleural or pulmonary parenchymal abnormality.      Electronically signed by:  Yg Manzo M.D.     2/10/2022 10:47 PM Mountain Time        Vitals:    02/10/22 2325 02/11/22 0013 02/11/22 0041 02/11/22 0532   BP: (!) 226/105  (!) 208/107 174/89   BP Location: Left arm   Left arm   Patient Position: Sitting   Lying   Pulse: 89   81   Resp: 18   16   Temp:  97.9 °F (36.6 °C)     TempSrc:  Oral     SpO2: 92%   97%   Weight: 56.7 kg (125 lb)      Height: 172.7 cm (68\")        Medications   sodium chloride 0.9 % flush 10 mL (10 mL Intravenous Given 2/11/22 0311)   potassium chloride 10 mEq in 100 mL IVPB (10 mEq Intravenous New Bag 2/11/22 0525)   lactated ringers infusion (has no administration in time range)   potassium chloride (MICRO-K) CR capsule 40 mEq (has no administration in time range)     Or   potassium chloride (KLOR-CON) packet 40 mEq (has no administration in time range)     Or   potassium chloride 10 mEq in 100 mL IVPB (has no administration in time range)   pantoprazole (PROTONIX) injection 40 mg (40 mg Intravenous Given 2/11/22 0535)   sodium chloride 0.9 % bolus 2,000 mL (2,000 mL Intravenous New Bag 2/11/22 0312)   pantoprazole (PROTONIX) injection 40 mg (40 mg Intravenous Given 2/11/22 0311)   metoclopramide (REGLAN) injection 10 mg (10 mg Intravenous Given 2/11/22 " 0311)   hydrALAZINE (APRESOLINE) injection 10 mg (10 mg Intravenous Given 2/11/22 8231)     ECG/EMG Results (last 24 hours)     Procedure Component Value Units Date/Time    ECG 12 Lead [[id number]] Collected: 02/10/22 2304     Updated: 02/10/22 2305        ECG 12 Lead                                                   MDM    Final diagnoses:   Intractable nausea and vomiting   Lower abdominal pain   Elevated amylase   Gastroparesis   Acute renal insufficiency   Dehydration   Hypertensive urgency   Uncontrolled type 1 diabetes mellitus with hyperglycemia (HCC)   History of hyperlipidemia   History of stroke       ED Disposition  ED Disposition     ED Disposition Condition Comment    Decision to Admit  Level of Care: Telemetry [5]   Diagnosis: Dehydration [276.51.ICD-9-CM]   Admitting Physician: BART FONSECA [[id number]]   Attending Physician: BART FONSECA [[id number]]   Bed Request Comments: tele            No follow-up provider specified.       Medication List      No changes were made to your prescriptions during this visit.          Diana Calderón PA-C  02/11/22 0600

## 2022-02-11 NOTE — PROGRESS NOTES
This is nonbillable note.    Patient was admitted after midnight      Summary:  61 years old female patient with past medical history of type 1 diabetes, diabetic gastroparesis, dyslipidemia, GERD, essential hypertension,, depression and anxiety disorder who presented to the hospital with intractable nausea vomiting and diarrhea typical for her gastroparesis flare.    Assessment and plan:   Intractable nausea and vomiting secondary to diabetic gastroparesis  GERD  · Continue IV Reglan  · GI team consulted.  Recommended to discharge the patient on as needed subcutaneous Reglan  · Protonix IV twice daily  · Continue clear liquid diet  · Hold ferrous sulfate due to possible gastric irritation contributing to her nausea and vomiting  · GI recommended to discontinue THC due to possible component of cannabis hyperemesis    Type 1 diabetes  · A1c 10.3%  · Continue insulin through insulin pump, monitor fingersticks  · IV fluids for hydration    Uncontrolled hypertension  · Continue home dose amlodipine  · And clonidine 0.2 mg every 8 hours as needed     Hypokalemia  Hypercalcemia  Elevated creatinine  · All secondary to dehydration  · Continue IV fluids and monitor electrolytes with a.m. lab    Dyslipidemia  · Statins    Patient initially wanted to be discharged because of death of her brother-in-law but changed her mind and decided to stay because she is feeling sick and unable to help her sister

## 2022-02-11 NOTE — H&P
[facility] Medicine Services  HISTORY AND PHYSICAL    Patient Name: Thelma Michael  : 1960  MRN: [id number]  Primary Care Physician: Monet Howe APRN  Date of admission: 2/10/2022    Subjective   Subjective     Chief Complaint:  Nausea/vomiting/diarrhea      HPI:  Thelma Michael is a 61 y.o. female with a past medical history significant for essential hypertension, Type 1 Diabetes mellitus, hyperlipidemia, GERD, gastroparesis, depression and anxiety presents to the ED with complaints of nausea/vomiting and diarrhea that has been ongoing for two days.  Patient reports inability to keep any solid or liquids down.  She denies any fever, chills, melena, hematochezia, dysuria, shortness of air, cough, chest pain or urinary frequency.  She does report generalized abdominal pain.  Patient  Denies any vomiting or diarrhea since being in the ED.     Labs obtained in the ED reveal potassium of 2.8, creatinine 1.11 and WBC 10.95.  [facility] of the [facility]ist for further evaluation and treatment.        COVID Details:    Symptoms:    [x] NONE [] Fever []  Cough [] Shortness of breath [] Change in taste/smell      Review of Systems   Constitutional: Positive for appetite change. Negative for activity change, chills, diaphoresis, fatigue, fever and unexpected weight change.   HENT: Negative.    Eyes: Negative for photophobia and visual disturbance.   Respiratory: Negative for cough and shortness of breath.    Cardiovascular: Negative for chest pain, palpitations and leg swelling.   Gastrointestinal: Positive for abdominal pain, diarrhea, nausea and vomiting. Negative for abdominal distention, blood in stool and constipation.   Genitourinary: Negative.    Musculoskeletal: Negative.    Skin: Negative.    Neurological: Negative.    Psychiatric/Behavioral: Negative.         All other systems reviewed and are negative.     Personal  History     Past Medical History:   Diagnosis Date   • Acid reflux    • Acute bronchitis    • Cardiac murmur    • Diabetes mellitus (HCC)    • H/O echocardiogram 08/07/2012    i. LVEF 65%.ii. Mild LVH.iii. Borderline evidence of atrial septal aneurysm.  No PFO.    • History of nuclear stress test 08/22/2014    Negative for ischemia and scars; LVEF 77%.     • Hyperlipidemia    • Hypertension    • Impacted cerumen of both ears    • Migraine    • Self-catheterizes urinary bladder    • Sinusitis    • Stroke (HCC)    • Tobacco abuse     quit 4 days ago.     • Urticaria        Past Surgical History:   Procedure Laterality Date   • CAPSULE ENDOSCOPY  7/27/2021    Procedure: PILLCAM DEPLOYMENT;  Surgeon: Mikael Worthy MD;  Location:  [facility];  Service: Gastroenterology;;   • COLONOSCOPY     • COLONOSCOPY N/A 7/27/2021    Procedure: COLONOSCOPY;  Surgeon: Mikael Worthy MD;  Location:  [facility];  Service: Gastroenterology;  Laterality: N/A;   • DENTAL PROCEDURE     • ENDOSCOPY N/A 6/30/2021    Procedure: ESOPHAGOGASTRODUODENOSCOPY;  Surgeon: Brunner, Mark I, MD;  Location:  [facility];  Service: Gastroenterology;  Laterality: N/A;   • ENDOSCOPY N/A 7/27/2021    Procedure: ESOPHAGOGASTRODUODENOSCOPY;  Surgeon: Mikael Worthy MD;  Location:  [facility];  Service: Gastroenterology;  Laterality: N/A;   • NO PAST SURGERIES         Family History: family history includes ADD / ADHD in an other family member; Anxiety disorder in an other family member; Arthritis in an other family member; Depression in an other family member; Diabetes in her brother and another family member; Heart disease in an other family member; Hyperlipidemia in her mother and another family member; Hypertension in her brother, mother, and another family member; Lung cancer in an other family member; Osteoporosis in an other family member. Otherwise pertinent FHx was reviewed and unremarkable.     Social History:   reports that she quit smoking about 2 years ago. She has never used smokeless tobacco. She reports current alcohol use of about 1.0 standard drink of alcohol per week. She reports that she does not use drugs.  Social History     Social History Narrative   • Not on file       Medications:  Ascorbic Acid, BASAGLAR KWIKPEN, Benefiber Drink Mix, Dexcom G6 , Dexcom G6 Sensor, Dexcom G6 Transmitter, FLUoxetine, Insulin Lispro (1 Unit Dial), Insulin Pen Needle, Multivitamin-Minerals, acetaminophen, albuterol sulfate HFA, amLODIPine, aspirin, atorvastatin, calcium carbonate, donepezil, doxazosin, ferrous sulfate, gabapentin, glucose blood, glucose monitor, megestrol, melatonin, multivitamin, ondansetron, pantoprazole, sennosides-docusate, traMADol, traZODone, and vitamin D    No Known Allergies    Objective   Objective     Vital Signs:   Temp:  [97.9 °F (36.6 °C)] 97.9 °F (36.6 °C)  Heart Rate:  [81-89] 81  Resp:  [16-18] 16  BP: (174-226)/() 174/89    Physical Exam  Vitals and nursing note reviewed.   Constitutional:       General: She is not in acute distress.     Appearance: Normal appearance. She is normal weight. She is not ill-appearing, toxic-appearing or diaphoretic.   HENT:      Head: Normocephalic and atraumatic.      Nose: Nose normal.      Mouth/Throat:      Mouth: Mucous membranes are dry.      Pharynx: Oropharynx is clear.   Eyes:      Extraocular Movements: Extraocular movements intact.      Conjunctiva/sclera: Conjunctivae normal.      Pupils: Pupils are equal, round, and reactive to light.   Cardiovascular:      Rate and Rhythm: Normal rate and regular rhythm.      Pulses: Normal pulses.      Heart sounds: Normal heart sounds.   Pulmonary:      Effort: Pulmonary effort is normal.      Breath sounds: Normal breath sounds.   Abdominal:      General: Bowel sounds are normal. There is no distension.      Palpations: Abdomen is soft. There is no mass.      Tenderness: There is no abdominal  tenderness. There is no right CVA tenderness, left CVA tenderness, guarding or rebound.      Hernia: No hernia is present.   Musculoskeletal:         General: No swelling, tenderness, deformity or signs of injury. Normal range of motion.      Cervical back: Normal range of motion and neck supple.      Right lower leg: No edema.      Left lower leg: No edema.   Skin:     General: Skin is warm and dry.   Neurological:      General: No focal deficit present.      Mental Status: She is alert and oriented to person, place, and time. Mental status is at baseline.   Psychiatric:         Mood and Affect: Mood normal.         Behavior: Behavior normal.         Thought Content: Thought content normal.            Results Reviewed:  I have personally reviewed most recent indicated data and agree with findings including:  [x]  Laboratory  [x]  Radiology  [x]  EKG/Telemetry  []  Pathology  []  Cardiac/Vascular Studies  []  Old records  []  Other:  Most pertinent findings include:      LAB RESULTS:      Lab 02/11/22  0401 02/11/22  0051   WBC  --  10.95*   HEMOGLOBIN  --  15.2   HEMATOCRIT  --  43.6   PLATELETS  --  481*   NEUTROS ABS  --  8.88*   IMMATURE GRANS (ABS)  --  0.04   LYMPHS ABS  --  1.47   MONOS ABS  --  0.55   EOS ABS  --  0.00   MCV  --  86.0   LACTATE 1.4  --          Lab 02/11/22  0051   SODIUM 141   POTASSIUM 2.8*   CHLORIDE 97*   CO2 27.0   ANION GAP 17.0*   BUN 20   CREATININE 1.11*   GLUCOSE 147*   CALCIUM 11.1*   MAGNESIUM 2.1         Lab 02/11/22  0051   TOTAL PROTEIN 9.0*   ALBUMIN 5.10   GLOBULIN 3.9   ALT (SGPT) 12   AST (SGOT) 18   BILIRUBIN 0.6   ALK PHOS 117   AMYLASE 168*   LIPASE 11*         Lab 02/11/22  0051   TROPONIN T 0.012                 Brief Urine Lab Results  (Last result in the past 365 days)      Color   Clarity   Blood   Leuk Est   Nitrite   Protein   CREAT   Urine HCG        07/11/21 1925 Yellow   Clear   Negative   Small (1+)   Negative   Negative               Microbiology Results  (last 10 days)     Procedure Component Value - Date/Time    COVID PRE-OP / PRE-PROCEDURE SCREENING ORDER (NO ISOLATION) - Swab, Nasopharynx [[id number]]  (Normal) Collected: 02/11/22 0051    Lab Status: Final result Specimen: Swab from Nasopharynx Updated: 02/11/22 0200    Narrative:      The following orders were created for panel order COVID PRE-OP / PRE-PROCEDURE SCREENING ORDER (NO ISOLATION) - Swab, Nasopharynx.  Procedure                               Abnormality         Status                     ---------                               -----------         ------                     COVID-19 and FLU A/B PCR...[[id number]]  Normal              Final result                 Please view results for these tests on the individual orders.    COVID-19 and FLU A/B PCR - Swab, Nasopharynx [[id number]]  (Normal) Collected: 02/11/22 0051    Lab Status: Final result Specimen: Swab from Nasopharynx Updated: 02/11/22 0200     COVID19 Not Detected     Influenza A PCR Not Detected     Influenza B PCR Not Detected    Narrative:      Fact sheet for providers: https://www.fda.gov/media/643726/download    Fact sheet for patients: https://www.fda.gov/media/184450/download    Test performed by PCR.          CT Abdomen Pelvis Without Contrast    Result Date: 2/11/2022  EXAM: CT OF THE ABDOMEN AND PELVIS WITHOUT IV CONTRAST INDICATIONS: Lower abdominal pain, nausea, vomiting TECHNIQUE: CT of the abdomen and pelvis was performed without the administration of intravenous or oral contrast. CT dose lowering techniques were used, to include: automated exposure control, adjustment for patient size, and / or use of iterative reconstruction. COMPARISON: 6/27/2021 FINDINGS: Bones: No destructive osseous lesions. Lung bases: Unremarkable ABDOMEN: Liver: Unremarkable in noncontrast appearance. Gallbladder and Bile Ducts: Unremarkable CT appearance of the gallbladder. There is no intrahepatic or extrahepatic biliary ductal dilatation. Spleen:  Unremarkable in noncontrast appearance. Pancreas: The pancreatic parenchyma is unremarkable. There is no pancreatic ductal dilatation. Adrenals: Bilateral adrenal adenomas are present. Kidneys: There is no hydronephrosis or nephrolithiasis. A hypoattenuating structure in the left kidney is too small to characterize. Vasculature: There are atherosclerotic calcifications throughout the abdomen and pelvis, without aneurysm. Nodes: There is no lymphadenopathy by size criteria. Bowel: There is no bowel obstruction. There is scattered diverticulosis without evidence of acute diverticulitis. An unremarkable appendix is identified. Mesentery/Peritoneum: Unremarkable Soft Tissues: Unremarkable PELVIS: Pelvic Organs: There is no abnormal pelvic mass. A somewhat lobular urinary bladder is present, nonspecific. This may represent neurogenic bladder.     Impression: 1. No evidence of acute disease in the abdomen or pelvis. 2. Scattered colonic diverticula. 3. Bilateral adrenal adenomas. 4. Additional chronic findings as above. Electronically signed by:  Yg Manzo M.D.  2/10/2022 11:23 PM Mountain Time    XR Chest 1 View    Result Date: 2/11/2022  Examination: AP view of the chest Indication: Dizziness/weakness Comparison: 1/8/2022 Findings: The cardiomediastinal silhouette is within normal size limits. There is no consolidative airspace disease, pleural effusion or pulmonary edema. There is no pneumothorax. No acute osseous abnormality is identified. There is scoliotic curvature of the thoracic spine.     Impression: Impression: No evidence of an acute pleural or pulmonary parenchymal abnormality. Electronically signed by:  Yg Manzo M.D.  2/10/2022 10:47 PM Mountain Time      Results for orders placed during the hospital encounter of 11/19/19    Adult Transesophageal Echo (LIZANDRO) W/ Cont if Necessary Per Protocol    Interpretation Summary  · Left ventricular systolic function is normal. Estimated EF = 65%.  · Left  ventricular wall thickness is consistent with hypertrophy.  · Small patent foramen ovale present. Saline test results are positive with valsalva manuever.  · There is mild mitral valve prolapse of the anterior mitral leaflet.  · Mild tricuspid valve regurgitation is present.  · Estimated right ventricular systolic pressure from tricuspid regurgitation is mildly elevated (35-45 mmHg).  · There is mild plaque in the descending aorta present.      Assessment/Plan   Assessment & Plan       Hyperlipidemia    Hypertension    Hypertensive urgency    Uncontrolled type 1 diabetes mellitus with hyperglycemia (HCC)    Gastroparesis    Dehydration    Nausea vomiting and diarrhea    Hypokalemia    Elevated serum creatinine    Thrombocytosis    Hypercalcemia    Elevated serum protein level    Leukocytosis      61 year old female presents to the ED with nausea/vomiting/diarrhea over the past two days.     1) Intractable nausea/vomiting      Diarrhea  -obtain stool studies  -CT of abdomen and pelvis noted above  -Gastroparesis versus viral infection, versus other  -consult GI in am   -NPO until evaluated by GI   -IVF  -continue reglan, protonix   -monitor electrolytes  -prn anti-emetics      2) Hypokalemia  -replace per protocol   -check mag  -repeat labs in am     3) Elevated creatinine  -likely secondary to hypovolemia  -continue IVF  -received 2L normal saline in the ED  -hold nephrotoxic agents    4) Hypercalcemia  -likely secondary to hypovolemia   -check ionized calcium     5) Thrombocytosis   -etiology not completely clear  -check iron profile  -check inflammatory markers   -consider hem/onc consult     5) Diabetes mellitus type 1  -check hgb A1c   -on insulin pump: continue  -check fingersticks achs   -follows with endocrinology     6) Hypertensive urgency   -BP currently 208/107  -unable to keep down home BP medication   -will given dose of IV hydralazine now  -if no improvement start cardene drip     7)  Hyperlipidemia  -continue statin     8) elevated serum protein  -A.m. labs  -Likely due to volume depletion.  But patient has hypercalcemia, RAFAT, elevated serum protein with normal albumin.  Monitor for concerns for multiple myeloma    9) leukocytosis  -CT abdomen pelvis without acute process  -UA pending  -Chest x-ray without acute process  -Pro-Eulalio pending  -Monitor for need for antibiotics    -of note patient is requesting to be DC'd as soon as seen feasible due to recent death in the family.     DVT prophylaxis:  Heparin     CODE STATUS:  Full code   Level Of Support Discussed With: Patient  Code Status (Patient has no pulse and is not breathing): CPR (Attempt to Resuscitate)  Medical Interventions (Patient has pulse or is breathing): Full Support      This note has been completed as part of a split-shared workflow.     Signature: Electronically signed by HOWIE Canseco, 02/11/22, 4:35 AM EST.      Attending   Admission Attestation       I have seen and examined the patient, performing an independent face-to-face diagnostic evaluation with plan of care reviewed and developed with the advanced practice clinician (APC).      Brief Summary Statement:   Thelma Michael is a 61 y.o. female with a PMH significant for type 1 diabetes mellitus, HTN, HLD, depression, anxiety, gastroparesis who presents to the ED due to nausea, vomiting, diarrhea.  Patient asleep, arousable.  Unwilling to provide HPI.  Patient reported nausea, vomiting, diarrhea, generalized abdominal pain for the past 2 days and inability to tolerate oral intake.  Denied fever.  Remainder of detailed HPI is as noted by APC and has been reviewed and/or edited by me for completeness.    Attending Physical Exam:  Constitutional: Asleep but arousable  Eyes: Eyes closed  HENT: NCAT, mucous membranes moist  Neck: Supple, no thyromegaly, no lymphadenopathy, trachea midline  Respiratory: Clear to auscultation bilaterally, nonlabored respirations    Cardiovascular: RRR, no murmurs, rubs, or gallops, palpable pedal pulses bilaterally  Gastrointestinal: Positive bowel sounds, soft, nontender, nondistended  Musculoskeletal: No bilateral ankle edema, no clubbing or cyanosis to extremities  Psychiatric: Asleep and arousable, uncooperative  Neurologic: Unable to assess due to patient disposition  Skin: No rashes      Brief Assessment/Plan :  See detailed assessment and plan developed with APC which I have reviewed and/or edited for completeness.        Admission Status: I believe that this patient meets OBSERVATION status, however if further evaluation or treatment plans warrant, status may change.  Based upon current information, I predict patient's care encounter to be less than or equal to 2 midnights.      Samantha Francois, DO  02/11/22

## 2022-02-11 NOTE — PLAN OF CARE
Goal Outcome Evaluation:  VSS at the moment. Pt hypertensive upon arrival on floor. PRN Clonidine administered, effective. Intermittent nausea. No new concerns. Will continue to monitor.     Problem: Adult Inpatient Plan of Care  Goal: Absence of Hospital-Acquired Illness or Injury  Intervention: Identify and Manage Fall Risk  Description: Perform standard risk assessment with a validated tool or comprehensive approach appropriate to the patient on admission; reassess fall risk frequently, with change in status or transfer to another level of care.  Communicate fall injury risk to interprofessional healthcare team.  Determine need for increased observation, equipment and environmental modification, such as low bed and signage, as well as supportive, nonskid footwear.  Adjust safety measures to individual developmental age, stage and identified risk factors.  Reinforce the importance of safety and physical activity with patient and family.  Perform regular intentional rounding to assess need for position change, pain assessment, personal needs, including assistance with toileting.  Recent Flowsheet Documentation  Taken 2/11/2022 1800 by Clara Rojas, RN  Safety Promotion/Fall Prevention:   activity supervised   assistive device/personal items within reach   clutter free environment maintained   fall prevention program maintained   nonskid shoes/slippers when out of bed   safety round/check completed   room organization consistent  Taken 2/11/2022 1515 by Clara Rojas, RN  Safety Promotion/Fall Prevention:   assistive device/personal items within reach   activity supervised   clutter free environment maintained   fall prevention program maintained   nonskid shoes/slippers when out of bed   safety round/check completed   room organization consistent  Intervention: Prevent Skin Injury  Description: Assess skin risk on admission and at regular intervals throughout hospital stay.  Keep all areas of skin  (especially folds) clean and dry.  Maintain adequate skin hydration.  Relieve and redistribute pressure and protect bony prominences; implement measures based on patient-specific risk factors.  Match turning and repositioning schedule to clinical condition.  Encourage weight shift frequently; assist with reposition if unable to complete independently.  Float heels off bed. Avoid pressure on the Achilles tendon.  Keep skin free from extended contact with medical devices.  Use aids (e.g., slide boards, mechanical lift) during transfer.  Recent Flowsheet Documentation  Taken 2/11/2022 1800 by Clara Rojas RN  Body Position: position changed independently  Skin Protection:   adhesive use limited   incontinence pads utilized   transparent dressing maintained   tubing/devices free from skin contact  Taken 2/11/2022 1515 by Clara Rojas RN  Body Position: position changed independently  Skin Protection:   adhesive use limited   incontinence pads utilized   tubing/devices free from skin contact   transparent dressing maintained  Intervention: Prevent Infection  Description: Maintain skin and mucous membrane integrity; promote hand, oral and pulmonary hygiene.  Optimize fluid balance, nutrition, sleep and glycemic control to maximize infection resistance.  Identify potential sources of infection early to prevent or mitigate progression of infection (e.g., wound, lines, devices).  Evaluate ongoing need for invasive devices; remove promptly when no longer indicated.  Recent Flowsheet Documentation  Taken 2/11/2022 1800 by Clara Rojas RN  Infection Prevention:   rest/sleep promoted   cohorting utilized   environmental surveillance performed  Taken 2/11/2022 1515 by Clara Rojas RN  Infection Prevention:   rest/sleep promoted   environmental surveillance performed   cohorting utilized  Goal: Optimal Comfort and Wellbeing  Intervention: Provide Person-Centered Care  Description: Use a  family-focused approach to care.  Develop trust and rapport by proactively providing information, encouraging questions, addressing concerns and offering reassurance.  Acknowledge emotional response to hospitalization.  Recognize and utilize personal coping strategies.  Honor spiritual and cultural preferences.  Recent Flowsheet Documentation  Taken 2/11/2022 1515 by Clara Rojas, RN  Trust Relationship/Rapport:   care explained   choices provided   empathic listening provided   questions answered   thoughts/feelings acknowledged  Goal: Readiness for Transition of Care  Intervention: Mutually Develop Transition Plan  Description: Identify available resources for support (e.g., family, friends, community).  Identify and address barriers to ongoing treatment and home management (e.g., environmental, financial).  Provide opportunities to practice self-management skills.  Assess and monitor emotional readiness for transition.  Establish/reconnect linkage with outpatient providers or community-based services.  Recent Flowsheet Documentation  Taken 2/11/2022 1510 by Clara Rojas, RN  Equipment Currently Used at Home: none  Transportation Anticipated: family or friend will provide  Patient/Family Anticipated Services at Transition:   Patient/Family Anticipates Transition to: home with family

## 2022-02-11 NOTE — CONSULTS
[facility] Gastroenterology Consult    Referring Provider: Samantha Francois MD   PCP: Monet Howe APRN    Reason for Consultation: Nausea and vomiting     Chief complaint: Nausea and vomiting     History of present illness:    Thelma Michael is a 61 y.o. female who is admitted with intractable nausea and vomiting.   She is known to our service for history of gastroparesis and uncontrolled type I diabetes mellitus.  She had a gastric emptying study on 7/1/2021 that showed delayed gastric emptying with only 25% gastric emptying at 90 minutes.   She has been maintained on Tonya Root but states she actually only takes this once per day.       Her nausea and vomiting began five days ago at home.  She has associated upper abdominal pain.   She uses marijuana regularly.      She recently got an insulin pump one month ago.  Her A1c has improved from 13 to 10.3.        Allergies:  Patient has no known allergies.    Scheduled Meds:  amLODIPine, 10 mg, Oral, Daily  ascorbic acid, 500 mg, Oral, Daily  aspirin, 81 mg, Oral, Daily  atorvastatin, 80 mg, Oral, Nightly  donepezil, 5 mg, Oral, Nightly  ferrous sulfate, 325 mg, Oral, Daily With Breakfast  FLUoxetine, 20 mg, Oral, Daily  heparin (porcine), 5,000 Units, Subcutaneous, Q12H  megestrol, 40 mg, Oral, Daily  multivitamin with minerals, 1 tablet, Oral, Daily  pantoprazole, 40 mg, Intravenous, Q AM  terazosin, 1 mg, Oral, Nightly         Infusions:  lactated ringers, 100 mL/hr, Last Rate: 100 mL/hr (02/11/22 1408)        PRN Meds:  •  acetaminophen **OR** acetaminophen **OR** acetaminophen  •  albuterol sulfate HFA  •  calcium carbonate  •  dextrose  •  dextrose  •  gabapentin  •  glucagon (human recombinant)  •  melatonin  •  metoclopramide  •  ondansetron **OR** ondansetron  •  potassium chloride **OR** potassium chloride **OR** potassium chloride  •  promethazine  •  sodium chloride  •  traMADol    Home Meds:  Medications Prior to Admission   Medication Sig  Dispense Refill Last Dose   • amLODIPine (NORVASC) 10 MG tablet Take 1 tablet by mouth Daily. 90 tablet 1 2/11/2022 at Unknown time   • Ascorbic Acid (VITAMIN C PO) Vitamin C TABS   2/11/2022 at Unknown time   • aspirin 81 MG EC tablet Take 1 tablet by mouth Daily.   2/11/2022 at Unknown time   • atorvastatin (LIPITOR) 80 MG tablet Take 1 tablet by mouth Every Night. 90 tablet 1 2/11/2022 at Unknown time   • Continuous Blood Gluc  (Dexcom G6 ) device 1 kit Every 3 (Three) Months. 1 each 0 2/11/2022 at Unknown time   • donepezil (Aricept) 5 MG tablet Take 1 tablet by mouth Every Night. 30 tablet 1 2/11/2022 at Unknown time   • doxazosin (CARDURA) 1 MG tablet Take 1 tablet by mouth Every Night. 90 tablet 1 2/9/2022 at Unknown time   • ferrous sulfate 325 (65 FE) MG tablet Take 1 tablet by mouth Daily With Breakfast. Take with orange juice or vitamin C 30 tablet 2 2/11/2022 at Unknown time   • FLUoxetine (PROzac) 20 MG capsule Take 1 capsule by mouth Daily. 90 capsule 1 2/11/2022 at Unknown time   • gabapentin (NEURONTIN) 400 MG capsule Take 1 capsule by mouth 2 (Two) Times a Day As Needed (leg and foot pain). 60 capsule 1 2/11/2022 at Unknown time   • megestrol (MEGACE) 40 MG tablet Take 1 tablet by mouth Daily. 30 tablet 2 2/10/2022 at Unknown time   • Multiple Vitamins-Minerals (Multivitamin-Minerals) tablet Take 1 tablet by mouth Daily.   2/10/2022 at Unknown time   • multivitamin (Multiple Vitamin) tablet tablet Take 1 tablet by mouth Daily. 30 tablet 11 2/10/2022 at Unknown time   • pantoprazole (PROTONIX) 40 MG EC tablet Take 1 tablet by mouth Daily. 30 tablet 5 2/10/2022 at Unknown time   • traMADol (ULTRAM) 50 MG tablet Take 1 tablet by mouth Every 6 (Six) Hours As Needed for Moderate Pain . 28 tablet 0 Past Week at Unknown time   • acetaminophen (TYLENOL) 325 MG tablet Take 2 tablets by mouth Every 4 (Four) Hours As Needed for Mild Pain .      • albuterol sulfate  (90 Base) MCG/ACT  inhaler Inhale 2 puffs Every 4 (Four) Hours As Needed for Wheezing. 18 g 5 Unknown at Unknown time   • calcium carbonate (TUMS) 500 MG chewable tablet Chew 1,000 mg 3 (Three) Times a Day As Needed for Indigestion or Heartburn.   More than a month at Unknown time   • Continuous Blood Gluc Sensor (Dexcom G6 Sensor) Every 10 (Ten) Days. 9 each 3    • Continuous Blood Gluc Transmit (Dexcom G6 Transmitter) misc 1 kit Every 3 (Three) Months. 1 each 3    • glucose blood (Glucose Meter Test) test strip Use as instructed to check blood glucose levels 3 times daily 100 each 5    • glucose blood test strip Use as instructed 200 each 12    • glucose monitor monitoring kit 1 each 4 (Four) Times a Day. 1 each 0    • Insulin Glargine (BASAGLAR KWIKPEN) 100 UNIT/ML injection pen Inject 20 Units under the skin into the appropriate area as directed Every Night. 15 mL 3    • Insulin Lispro, 1 Unit Dial, (HumaLOG KwikPen) 100 UNIT/ML solution pen-injector 15 units tid 45 mL 3    • Insulin Pen Needle (BD Pen Needle Cydney U/F) 32G X 4 MM misc Take 1 each by mouth 4 (Four) Times a Day. 100 each 5    • melatonin 5 MG tablet tablet Take 1 tablet by mouth At Night As Needed (sleep).      • ondansetron (ZOFRAN) 4 MG tablet Take 1 tablet by mouth Every 6 (Six) Hours As Needed for Nausea or Vomiting. 15 tablet 0    • sennosides-docusate (senna-docusate sodium) 8.6-50 MG per tablet Take 2 tablets by mouth 2 (Two) Times a Day As Needed for Constipation. 60 tablet 5    • traZODone (DESYREL) 50 MG tablet TAKE 1-2 TABLETS ONE HOUR BEFORE BEDTIME AS NEEDED FOR SLEEP. 45 tablet 0    • vitamin D (ERGOCALCIFEROL) 1.25 MG (20363 UT) capsule capsule Take 1 capsule by mouth once a week 4 capsule 0    • Wheat Dextrin (Benefiber Drink Mix) pack Take 1 Scoop by mouth Daily. 30 each 5        ROS: Review of Systems   Constitutional: Negative.    HENT: Negative.    Eyes: Negative.    Respiratory: Negative.    Cardiovascular: Negative.    Gastrointestinal:  Positive for abdominal pain, nausea and vomiting.   Endocrine: Negative.    Genitourinary: Negative.    Musculoskeletal: Negative.    Skin: Negative.    Allergic/Immunologic: Negative.    Neurological: Negative.    Hematological: Negative.    Psychiatric/Behavioral: Negative.        PAST MED HX:  Past Medical History:   Diagnosis Date   • Acid reflux    • Acute bronchitis    • Cardiac murmur    • Diabetes mellitus (HCC)    • H/O echocardiogram 08/07/2012    i. LVEF 65%.ii. Mild LVH.iii. Borderline evidence of atrial septal aneurysm.  No PFO.    • History of nuclear stress test 08/22/2014    Negative for ischemia and scars; LVEF 77%.     • Hyperlipidemia    • Hypertension    • Impacted cerumen of both ears    • Migraine    • Self-catheterizes urinary bladder    • Sinusitis    • Stroke (HCC)    • Tobacco abuse     quit 4 days ago.     • Urticaria        PAST SURG HX:  Past Surgical History:   Procedure Laterality Date   • CAPSULE ENDOSCOPY  7/27/2021    Procedure: PILLCAM DEPLOYMENT;  Surgeon: Mikael Worthy MD;  Location:  [facility];  Service: Gastroenterology;;   • COLONOSCOPY     • COLONOSCOPY N/A 7/27/2021    Procedure: COLONOSCOPY;  Surgeon: Mikael Worthy MD;  Location:  [facility];  Service: Gastroenterology;  Laterality: N/A;   • DENTAL PROCEDURE     • ENDOSCOPY N/A 6/30/2021    Procedure: ESOPHAGOGASTRODUODENOSCOPY;  Surgeon: Brunner, Mark I, MD;  Location:  [facility];  Service: Gastroenterology;  Laterality: N/A;   • ENDOSCOPY N/A 7/27/2021    Procedure: ESOPHAGOGASTRODUODENOSCOPY;  Surgeon: Mikael Worthy MD;  Location:  [facility];  Service: Gastroenterology;  Laterality: N/A;   • NO PAST SURGERIES         FAM HX:  Family History   Problem Relation Age of Onset   • Anxiety disorder Other    • Arthritis Other    • ADD / ADHD Other    • Heart disease Other         cardiac disorder   • Depression Other    • Diabetes Other    • Hyperlipidemia Other    • Hypertension  "Other    • Lung cancer Other    • Osteoporosis Other    • Hypertension Brother    • Diabetes Brother    • Hyperlipidemia Mother    • Hypertension Mother    • Colon cancer Neg Hx        SOC HX:  Social History     Socioeconomic History   • Marital status:    Tobacco Use   • Smoking status: Former Smoker     Quit date: 2019     Years since quittin.7   • Smokeless tobacco: Never Used   • Tobacco comment: BC PL never smoker    Vaping Use   • Vaping Use: Never used   Substance and Sexual Activity   • Alcohol use: Yes     Alcohol/week: 1.0 standard drink     Types: 1 Glasses of wine per week     Comment: ocassional   • Drug use: No   • Sexual activity: Defer     Comment: Single        PHYSICAL EXAM  BP (!) 195/92 (BP Location: Right arm, Patient Position: Lying)   Pulse 113   Temp 99.5 °F (37.5 °C) (Oral)   Resp 18   Ht 172.7 cm (68\")   Wt 54.9 kg (121 lb)   SpO2 100%   BMI 18.40 kg/m²   Wt Readings from Last 3 Encounters:   22 54.9 kg (121 lb)   22 56.7 kg (125 lb)   22 54.9 kg (121 lb)   ,body mass index is 18.4 kg/m².  Physical Exam  Constitutional:       Comments: Thin, underweight female that keeps a blanket covering her head during most of exam.   She is however conversant and pleasant.    HENT:      Head: Normocephalic and atraumatic.   Eyes:      General: No scleral icterus.  Cardiovascular:      Rate and Rhythm: Regular rhythm. Tachycardia present.   Pulmonary:      Effort: Pulmonary effort is normal. No respiratory distress.   Abdominal:      General: Bowel sounds are normal. There is no distension.      Palpations: Abdomen is soft.      Tenderness: There is no abdominal tenderness.   Musculoskeletal:      Right lower leg: No edema.      Left lower leg: No edema.   Skin:     General: Skin is warm and dry.   Neurological:      Mental Status: She is oriented to person, place, and time.   Psychiatric:      Comments: Depressed mood          Results Review:   I reviewed the " patient's new clinical results.    Lab Results   Component Value Date    WBC 10.84 (H) 02/11/2022    HGB 13.8 02/11/2022    HGB 15.2 02/11/2022    HGB 10.9 (L) 01/09/2022    HCT 40.6 02/11/2022    MCV 89.2 02/11/2022     02/11/2022     Lab Results   Component Value Date    INR 0.95 05/20/2021    INR 1.10 10/31/2019    INR 0.9 10/31/2019       Lab Results   Component Value Date    GLUCOSE 133 (H) 02/11/2022    BUN 21 02/11/2022    CREATININE 1.07 (H) 02/11/2022    EGFRIFNONA 61 01/06/2015    EGFRIFAFRI 63 02/11/2022    BCR 19.6 02/11/2022     02/11/2022    K 3.5 02/11/2022    CO2 23.0 02/11/2022    CALCIUM 9.5 02/11/2022    PROTENTOTREF 7.6 01/06/2015    ALBUMIN 4.20 02/11/2022    ALKPHOS 91 02/11/2022    BILITOT 0.6 02/11/2022    ALT 9 02/11/2022    AST 14 02/11/2022     CT Abdomen/Pelvis (as interpreted by radiologist)  Bones: No destructive osseous lesions.   Lung bases: Unremarkable   ABDOMEN:  Liver: Unremarkable in noncontrast appearance.   Gallbladder and Bile Ducts: Unremarkable CT appearance of the gallbladder. There is no intrahepatic or extrahepatic biliary ductal dilatation.   Spleen: Unremarkable in noncontrast appearance.   Pancreas: The pancreatic parenchyma is unremarkable. There is no pancreatic ductal dilatation.   Adrenals: Bilateral adrenal adenomas are present.   Kidneys: There is no hydronephrosis or nephrolithiasis. A hypoattenuating structure in the left kidney is too small to characterize.   Vasculature: There are atherosclerotic calcifications throughout the abdomen and pelvis, without aneurysm.   Nodes: There is no lymphadenopathy by size criteria.  Bowel: There is no bowel obstruction. There is scattered diverticulosis without evidence of acute diverticulitis. An unremarkable appendix is identified.   Mesentery/Peritoneum: Unremarkable   Soft Tissues: Unremarkable  PELVIS:  Pelvic Organs: There is no abnormal pelvic mass. A somewhat lobular urinary bladder is present,  nonspecific. This may represent neurogenic bladder.   IMPRESSION:  1. No evidence of acute disease in the abdomen or pelvis.  2. Scattered colonic diverticula.  3. Bilateral adrenal adenomas.  4. Additional chronic findings as above.    ASSESSMENTS/PLANS    1. Nausea and vomiting   2. Gastroparesis   3. Uncontrolled diabetes mellitus, A1c is 10.   Recently started an insulin pump.    4. THC use     >>> Begin IV Reglan 5 mg TID.   Will consider discharge on PRN SQ dose for home   >>> Recommend she increase her Tonya root from once daily to TID   >>> Increase Protonix to BID to decrease gastric secretions  >>> Liquid only diet for now.   Encourage use of Glucerna   >>> Hold Ferrous sulfate as this can contribute to nausea and her iron stores are within normal limits  >>> Needs to discontinue THC due to possible component of cannabis hyperemesis     Recommend referral to [facility] at discharge.  I will arrange.     I discussed the patient's findings and my recommendations with patient    JAMESON Sharma  02/11/22  15:32 EST

## 2022-02-11 NOTE — CASE MANAGEMENT/SOCIAL WORK
Discharge Planning Assessment  [facility]     Patient Name: Thelma Michael  MRN: [id number]  Today's Date: 2/11/2022    Admit Date: 2/10/2022     Discharge Needs Assessment     Row Name 02/11/22 1522       Living Environment    Lives With alone    Current Living Arrangements home/apartment/condo    Potentially Unsafe Housing Conditions unable to assess    Primary Care Provided by self    Provides Primary Care For no one    Quality of Family Relationships unable to assess    Able to Return to Prior Arrangements yes       Resource/Environmental Concerns    Transportation Concerns car, none       Transition Planning    Patient/Family Anticipates Transition to home    Transportation Anticipated family or friend will provide       Discharge Needs Assessment    Readmission Within the Last 30 Days no previous admission in last 30 days    Equipment Currently Used at Home shower chair    Concerns to be Addressed no discharge needs identified    Equipment Needed After Discharge other (see comments)  TBD    Discharge Facility/Level of Care Needs other (see comments)  TBD               Discharge Plan     Row Name 02/11/22 1527       Plan    Plan IDP    Patient/Family in Agreement with Plan yes    Plan Comments MSW met with pt. at bedside. Pt. reports that she lives alone in [facility]. Pt.’s PCP is Shayne Kcpard Depaul. Pt. reports that she is independent with all her ADL’s/IADL’s. Pt. reports that she has a shower chair. No O2, BiPAP, CPAP, or HH services currently.  Pt. has a living will on file. Pt. reports that she has transportation when she is medically ready. Pt.’s goal is to return home.  CM will continue to follow throughout her stay.    Final Discharge Disposition Code 30 - still a patient              Continued Care and Services - Admitted Since 2/10/2022    Coordination has not been started for this encounter.          Demographic Summary     Row Name 02/11/22 1509       General Information    Admission  Type observation    Arrived From home    Referral Source emergency department    Reason for Consult discharge planning    Preferred Language English     Used During This Interaction no               Functional Status     Row Name 02/11/22 1511       Functional Status, IADL    Medications independent    Meal Preparation independent    Housekeeping independent    Laundry independent    Shopping independent       Mental Status Summary    Recent Changes in Mental Status/Cognitive Functioning unable to assess       Employment/    Employment Status employed full-time               Psychosocial    No documentation.                Abuse/Neglect    No documentation.                Legal    No documentation.                Substance Abuse    No documentation.                Patient Forms    No documentation.                   PONCE Partida

## 2022-02-12 LAB
ALBUMIN SERPL-MCNC: 3.6 G/DL (ref 3.5–5.2)
ALBUMIN/GLOB SERPL: 1.6 G/DL
ALP SERPL-CCNC: 73 U/L (ref 39–117)
ALT SERPL W P-5'-P-CCNC: 9 U/L (ref 1–33)
ANION GAP SERPL CALCULATED.3IONS-SCNC: 12 MMOL/L (ref 5–15)
AST SERPL-CCNC: 16 U/L (ref 1–32)
BASOPHILS # BLD AUTO: 0.01 10*3/MM3 (ref 0–0.2)
BASOPHILS NFR BLD AUTO: 0.1 % (ref 0–1.5)
BILIRUB SERPL-MCNC: 0.7 MG/DL (ref 0–1.2)
BUN SERPL-MCNC: 23 MG/DL (ref 8–23)
BUN/CREAT SERPL: 21.7 (ref 7–25)
CALCIUM SPEC-SCNC: 9.4 MG/DL (ref 8.6–10.5)
CHLORIDE SERPL-SCNC: 105 MMOL/L (ref 98–107)
CO2 SERPL-SCNC: 22 MMOL/L (ref 22–29)
CREAT SERPL-MCNC: 1.06 MG/DL (ref 0.57–1)
DEPRECATED RDW RBC AUTO: 44.1 FL (ref 37–54)
EOSINOPHIL # BLD AUTO: 0.16 10*3/MM3 (ref 0–0.4)
EOSINOPHIL NFR BLD AUTO: 2.2 % (ref 0.3–6.2)
ERYTHROCYTE [DISTWIDTH] IN BLOOD BY AUTOMATED COUNT: 12.6 % (ref 12.3–15.4)
GFR SERPL CREATININE-BSD FRML MDRD: 63 ML/MIN/1.73
GLOBULIN UR ELPH-MCNC: 2.3 GM/DL
GLUCOSE BLDC GLUCOMTR-MCNC: 117 MG/DL (ref 70–130)
GLUCOSE BLDC GLUCOMTR-MCNC: 147 MG/DL (ref 70–130)
GLUCOSE BLDC GLUCOMTR-MCNC: 91 MG/DL (ref 70–130)
GLUCOSE SERPL-MCNC: 129 MG/DL (ref 65–99)
HCT VFR BLD AUTO: 35.8 % (ref 34–46.6)
HGB BLD-MCNC: 11.3 G/DL (ref 12–15.9)
IMM GRANULOCYTES # BLD AUTO: 0.03 10*3/MM3 (ref 0–0.05)
IMM GRANULOCYTES NFR BLD AUTO: 0.4 % (ref 0–0.5)
LYMPHOCYTES # BLD AUTO: 2.18 10*3/MM3 (ref 0.7–3.1)
LYMPHOCYTES NFR BLD AUTO: 30.2 % (ref 19.6–45.3)
MAGNESIUM SERPL-MCNC: 1.8 MG/DL (ref 1.6–2.4)
MCH RBC QN AUTO: 30 PG (ref 26.6–33)
MCHC RBC AUTO-ENTMCNC: 31.6 G/DL (ref 31.5–35.7)
MCV RBC AUTO: 95 FL (ref 79–97)
MONOCYTES # BLD AUTO: 0.56 10*3/MM3 (ref 0.1–0.9)
MONOCYTES NFR BLD AUTO: 7.8 % (ref 5–12)
NEUTROPHILS NFR BLD AUTO: 4.28 10*3/MM3 (ref 1.7–7)
NEUTROPHILS NFR BLD AUTO: 59.3 % (ref 42.7–76)
NRBC BLD AUTO-RTO: 0 /100 WBC (ref 0–0.2)
PHOSPHATE SERPL-MCNC: 1.8 MG/DL (ref 2.5–4.5)
PLATELET # BLD AUTO: 307 10*3/MM3 (ref 140–450)
PMV BLD AUTO: 10.1 FL (ref 6–12)
POTASSIUM SERPL-SCNC: 3.7 MMOL/L (ref 3.5–5.2)
PROT SERPL-MCNC: 5.9 G/DL (ref 6–8.5)
QT INTERVAL: 386 MS
QTC INTERVAL: 490 MS
RBC # BLD AUTO: 3.77 10*6/MM3 (ref 3.77–5.28)
SODIUM SERPL-SCNC: 139 MMOL/L (ref 136–145)
WBC NRBC COR # BLD: 7.22 10*3/MM3 (ref 3.4–10.8)

## 2022-02-12 PROCEDURE — 83735 ASSAY OF MAGNESIUM: CPT | Performed by: INTERNAL MEDICINE

## 2022-02-12 PROCEDURE — 99225 PR SBSQ OBSERVATION CARE/DAY 25 MINUTES: CPT | Performed by: INTERNAL MEDICINE

## 2022-02-12 PROCEDURE — 25010000002 METOCLOPRAMIDE PER 10 MG: Performed by: PHYSICIAN ASSISTANT

## 2022-02-12 PROCEDURE — 0 MAGNESIUM SULFATE 4 GM/100ML SOLUTION: Performed by: INTERNAL MEDICINE

## 2022-02-12 PROCEDURE — 80053 COMPREHEN METABOLIC PANEL: CPT | Performed by: INTERNAL MEDICINE

## 2022-02-12 PROCEDURE — 84100 ASSAY OF PHOSPHORUS: CPT | Performed by: INTERNAL MEDICINE

## 2022-02-12 PROCEDURE — G0378 HOSPITAL OBSERVATION PER HR: HCPCS

## 2022-02-12 PROCEDURE — 96366 THER/PROPH/DIAG IV INF ADDON: CPT

## 2022-02-12 PROCEDURE — 82962 GLUCOSE BLOOD TEST: CPT

## 2022-02-12 PROCEDURE — 25010000002 HEPARIN (PORCINE) PER 1000 UNITS: Performed by: NURSE PRACTITIONER

## 2022-02-12 PROCEDURE — 96376 TX/PRO/DX INJ SAME DRUG ADON: CPT

## 2022-02-12 PROCEDURE — 97161 PT EVAL LOW COMPLEX 20 MIN: CPT

## 2022-02-12 PROCEDURE — 0 POTASSIUM CHLORIDE 10 MEQ/100ML SOLUTION: Performed by: NURSE PRACTITIONER

## 2022-02-12 PROCEDURE — 99212 OFFICE O/P EST SF 10 MIN: CPT | Performed by: NURSE PRACTITIONER

## 2022-02-12 PROCEDURE — 96361 HYDRATE IV INFUSION ADD-ON: CPT

## 2022-02-12 PROCEDURE — 97166 OT EVAL MOD COMPLEX 45 MIN: CPT

## 2022-02-12 PROCEDURE — 85025 COMPLETE CBC W/AUTO DIFF WBC: CPT | Performed by: INTERNAL MEDICINE

## 2022-02-12 PROCEDURE — 96372 THER/PROPH/DIAG INJ SC/IM: CPT

## 2022-02-12 RX ORDER — MAGNESIUM SULFATE HEPTAHYDRATE 40 MG/ML
4 INJECTION, SOLUTION INTRAVENOUS ONCE
Status: COMPLETED | OUTPATIENT
Start: 2022-02-12 | End: 2022-02-12

## 2022-02-12 RX ADMIN — HEPARIN SODIUM 5000 UNITS: 5000 INJECTION, SOLUTION INTRAVENOUS; SUBCUTANEOUS at 08:27

## 2022-02-12 RX ADMIN — PANTOPRAZOLE SODIUM 40 MG: 40 INJECTION, POWDER, FOR SOLUTION INTRAVENOUS at 08:27

## 2022-02-12 RX ADMIN — METOCLOPRAMIDE 5 MG: 5 INJECTION, SOLUTION INTRAMUSCULAR; INTRAVENOUS at 08:27

## 2022-02-12 RX ADMIN — SODIUM CHLORIDE, POTASSIUM CHLORIDE, SODIUM LACTATE AND CALCIUM CHLORIDE 100 ML/HR: 600; 310; 30; 20 INJECTION, SOLUTION INTRAVENOUS at 06:29

## 2022-02-12 RX ADMIN — METOCLOPRAMIDE 5 MG: 5 INJECTION, SOLUTION INTRAMUSCULAR; INTRAVENOUS at 20:43

## 2022-02-12 RX ADMIN — HEPARIN SODIUM 5000 UNITS: 5000 INJECTION, SOLUTION INTRAVENOUS; SUBCUTANEOUS at 20:44

## 2022-02-12 RX ADMIN — POTASSIUM CHLORIDE 10 MEQ: 10 INJECTION, SOLUTION INTRAVENOUS at 00:40

## 2022-02-12 RX ADMIN — Medication 1 TABLET: at 08:26

## 2022-02-12 RX ADMIN — METOCLOPRAMIDE 5 MG: 5 INJECTION, SOLUTION INTRAMUSCULAR; INTRAVENOUS at 17:53

## 2022-02-12 RX ADMIN — MAGNESIUM SULFATE HEPTAHYDRATE 4 G: 40 INJECTION, SOLUTION INTRAVENOUS at 17:53

## 2022-02-12 RX ADMIN — GABAPENTIN 400 MG: 400 CAPSULE ORAL at 21:01

## 2022-02-12 RX ADMIN — TERAZOSIN HYDROCHLORIDE 1 MG: 1 CAPSULE ORAL at 20:42

## 2022-02-12 RX ADMIN — TRAMADOL HYDROCHLORIDE 50 MG: 50 TABLET, COATED ORAL at 18:20

## 2022-02-12 RX ADMIN — POTASSIUM CHLORIDE 10 MEQ: 10 INJECTION, SOLUTION INTRAVENOUS at 03:12

## 2022-02-12 RX ADMIN — ATORVASTATIN CALCIUM 80 MG: 40 TABLET, FILM COATED ORAL at 20:43

## 2022-02-12 RX ADMIN — POTASSIUM PHOSPHATE, MONOBASIC AND POTASSIUM PHOSPHATE, DIBASIC 30 MMOL: 224; 236 INJECTION, SOLUTION, CONCENTRATE INTRAVENOUS at 17:53

## 2022-02-12 RX ADMIN — MEGESTROL ACETATE 40 MG: 40 TABLET ORAL at 08:28

## 2022-02-12 RX ADMIN — ASPIRIN 81 MG: 81 TABLET, COATED ORAL at 08:26

## 2022-02-12 RX ADMIN — GABAPENTIN 400 MG: 400 CAPSULE ORAL at 08:49

## 2022-02-12 RX ADMIN — PANTOPRAZOLE SODIUM 40 MG: 40 INJECTION, POWDER, FOR SOLUTION INTRAVENOUS at 17:52

## 2022-02-12 RX ADMIN — POTASSIUM CHLORIDE 10 MEQ: 10 INJECTION, SOLUTION INTRAVENOUS at 02:01

## 2022-02-12 RX ADMIN — FLUOXETINE HYDROCHLORIDE 20 MG: 20 CAPSULE ORAL at 08:26

## 2022-02-12 RX ADMIN — DONEPEZIL HYDROCHLORIDE 5 MG: 5 TABLET, FILM COATED ORAL at 20:43

## 2022-02-12 RX ADMIN — AMLODIPINE BESYLATE 10 MG: 10 TABLET ORAL at 08:26

## 2022-02-12 RX ADMIN — OXYCODONE HYDROCHLORIDE AND ACETAMINOPHEN 500 MG: 500 TABLET ORAL at 08:26

## 2022-02-12 NOTE — PROGRESS NOTES
[facility] Medicine Services  PROGRESS NOTE    Patient Name: Thelma Michael  : 1960  MRN: [id number]    Date of Admission: 2/10/2022  Primary Care Physician: Monet Howe APRN    Subjective   Subjective     CC:  Follow-up for gastroparesis and intractable nausea vomiting    HPI:  I have seen and evaluated the patient this morning.  Comfortable.  Feeling slightly better today.  Able to tolerate full liquid diet.  Abdomen pain nausea and vomiting markedly improved.  No other acute complaints    ROS:  10 point review of systems is negative except for what is mentioned in HPI    Objective   Objective     Vital Signs:   Temp:  [99 °F (37.2 °C)-99.5 °F (37.5 °C)] 99.5 °F (37.5 °C)  Heart Rate:  [] 88  Resp:  [16-18] 18  BP: ()/() 165/82     Physical Exam:  General: Chronically looking, underweight, pleasant and conversant  Head: Atraumatic and normocephalic, without obvious abnormality  Eyes:   Conjunctivae and sclerae normal, no Icterus. No pallor  Ears:  Ears appear intact with no abnormalities noted  Throat: No oral lesions, no thrush, oral mucosa moist  Neck: Supple, trachea midline, no thyromegaly  Back:   No kyphoscoliosis present. No tenderness to palpation,   no sacral edema  Lungs: Clear to auscultation bilaterally, equal air entry, no wheezing or crackles  Heart:  Normal S1 and S2, no murmur, no gallop, No JVD, no lower extremity swelling  Abdomen:  Soft, no tenderness, no organomegaly, normal bowel sounds  Normal bowel sounds, no masses, no organomegaly. Soft, nontender, nondistended, no guarding, no rebound tenderness.  Extremities: No gross abnormalities, no clubbing, pulses palpable and equal bilaterally  Skin: No bleeding, bruising or rash, normal skin turgor and elasticity  Neurologic: Cranial nerves appear intact with no evidence of facial asymmetry, normal motor and sensory functions in all 4 extremities  Psych: Alert and oriented x  3, normal mood    Results Reviewed:  LAB RESULTS:      Lab 02/12/22  0309 02/11/22  0814 02/11/22  0401 02/11/22  0051   WBC 7.22 10.84*  --  10.95*   HEMOGLOBIN 11.3* 13.8  --  15.2   HEMATOCRIT 35.8 40.6  --  43.6   PLATELETS 307 380  --  481*   NEUTROS ABS 4.28 7.86*  --  8.88*   IMMATURE GRANS (ABS) 0.03 0.02  --  0.04   LYMPHS ABS 2.18 2.20  --  1.47   MONOS ABS 0.56 0.76  --  0.55   EOS ABS 0.16 0.00  --  0.00   MCV 95.0 89.2  --  86.0   SED RATE  --  27  --   --    CRP  --  0.34  --   --    PROCALCITONIN  --   --   --  0.11   LACTATE  --   --  1.4  --          Lab 02/12/22  1236 02/11/22  1651 02/11/22  0814 02/11/22  0602 02/11/22  0051   SODIUM 139  --  143  --  141   POTASSIUM 3.7 2.8* 3.5  --  2.8*   CHLORIDE 105  --  105  --  97*   CO2 22.0  --  23.0  --  27.0   ANION GAP 12.0  --  15.0  --  17.0*   BUN 23  --  21  --  20   CREATININE 1.06*  --  1.07*  --  1.11*   GLUCOSE 129*  --  133*  --  147*   CALCIUM 9.4  --  9.5  --  11.1*   IONIZED CALCIUM  --   --   --  1.25  --    MAGNESIUM 1.8  --  1.9  --  2.1   PHOSPHORUS 1.8*  --   --   --   --    HEMOGLOBIN A1C  --   --  10.30*  --   --          Lab 02/12/22  1236 02/11/22  0814 02/11/22  0051   TOTAL PROTEIN 5.9* 7.2 9.0*   ALBUMIN 3.60 4.20 5.10   GLOBULIN 2.3 3.0 3.9   ALT (SGPT) 9 9 12   AST (SGOT) 16 14 18   BILIRUBIN 0.7 0.6 0.6   ALK PHOS 73 91 117   AMYLASE  --   --  168*   LIPASE  --   --  11*         Lab 02/11/22  0051   TROPONIN T 0.012             Lab 02/11/22  0814   IRON 68   IRON SATURATION 19*   TIBC 359   TRANSFERRIN 241   FERRITIN 79.21         Brief Urine Lab Results  (Last result in the past 365 days)      Color   Clarity   Blood   Leuk Est   Nitrite   Protein   CREAT   Urine HCG        02/11/22 0745 Yellow   Clear   Small (1+)   Negative   Negative   >=300 mg/dL (3+)                 Microbiology Results Abnormal     Procedure Component Value - Date/Time    COVID PRE-OP / PRE-PROCEDURE SCREENING ORDER (NO ISOLATION) - Swab, Nasopharynx  [[id number]]  (Normal) Collected: 02/11/22 0051    Lab Status: Final result Specimen: Swab from Nasopharynx Updated: 02/11/22 0200    Narrative:      The following orders were created for panel order COVID PRE-OP / PRE-PROCEDURE SCREENING ORDER (NO ISOLATION) - Swab, Nasopharynx.  Procedure                               Abnormality         Status                     ---------                               -----------         ------                     COVID-19 and FLU A/B PCR...[[id number]]  Normal              Final result                 Please view results for these tests on the individual orders.    COVID-19 and FLU A/B PCR - Swab, Nasopharynx [[id number]]  (Normal) Collected: 02/11/22 0051    Lab Status: Final result Specimen: Swab from Nasopharynx Updated: 02/11/22 0200     COVID19 Not Detected     Influenza A PCR Not Detected     Influenza B PCR Not Detected    Narrative:      Fact sheet for providers: https://www.fda.gov/media/064098/download    Fact sheet for patients: https://www.fda.gov/media/764305/download    Test performed by PCR.          CT Abdomen Pelvis Without Contrast    Result Date: 2/11/2022  EXAM: CT OF THE ABDOMEN AND PELVIS WITHOUT IV CONTRAST INDICATIONS: Lower abdominal pain, nausea, vomiting TECHNIQUE: CT of the abdomen and pelvis was performed without the administration of intravenous or oral contrast. CT dose lowering techniques were used, to include: automated exposure control, adjustment for patient size, and / or use of iterative reconstruction. COMPARISON: 6/27/2021 FINDINGS: Bones: No destructive osseous lesions. Lung bases: Unremarkable ABDOMEN: Liver: Unremarkable in noncontrast appearance. Gallbladder and Bile Ducts: Unremarkable CT appearance of the gallbladder. There is no intrahepatic or extrahepatic biliary ductal dilatation. Spleen: Unremarkable in noncontrast appearance. Pancreas: The pancreatic parenchyma is unremarkable. There is no pancreatic ductal dilatation. Adrenals:  Bilateral adrenal adenomas are present. Kidneys: There is no hydronephrosis or nephrolithiasis. A hypoattenuating structure in the left kidney is too small to characterize. Vasculature: There are atherosclerotic calcifications throughout the abdomen and pelvis, without aneurysm. Nodes: There is no lymphadenopathy by size criteria. Bowel: There is no bowel obstruction. There is scattered diverticulosis without evidence of acute diverticulitis. An unremarkable appendix is identified. Mesentery/Peritoneum: Unremarkable Soft Tissues: Unremarkable PELVIS: Pelvic Organs: There is no abnormal pelvic mass. A somewhat lobular urinary bladder is present, nonspecific. This may represent neurogenic bladder.     Impression: 1. No evidence of acute disease in the abdomen or pelvis. 2. Scattered colonic diverticula. 3. Bilateral adrenal adenomas. 4. Additional chronic findings as above. Electronically signed by:  gY Manzo M.D.  2/10/2022 11:23 PM Mountain Time    XR Chest 1 View    Result Date: 2/11/2022  Examination: AP view of the chest Indication: Dizziness/weakness Comparison: 1/8/2022 Findings: The cardiomediastinal silhouette is within normal size limits. There is no consolidative airspace disease, pleural effusion or pulmonary edema. There is no pneumothorax. No acute osseous abnormality is identified. There is scoliotic curvature of the thoracic spine.     Impression: Impression: No evidence of an acute pleural or pulmonary parenchymal abnormality. Electronically signed by:  Yg Manzo M.D.  2/10/2022 10:47 PM Mountain Time      Results for orders placed during the hospital encounter of 11/19/19    Adult Transesophageal Echo (LIZANDRO) W/ Cont if Necessary Per Protocol    Interpretation Summary  · Left ventricular systolic function is normal. Estimated EF = 65%.  · Left ventricular wall thickness is consistent with hypertrophy.  · Small patent foramen ovale present. Saline test results are positive with valsalva  manuever.  · There is mild mitral valve prolapse of the anterior mitral leaflet.  · Mild tricuspid valve regurgitation is present.  · Estimated right ventricular systolic pressure from tricuspid regurgitation is mildly elevated (35-45 mmHg).  · There is mild plaque in the descending aorta present.      I have reviewed the medications:  Scheduled Meds:amLODIPine, 10 mg, Oral, Daily  ascorbic acid, 500 mg, Oral, Daily  aspirin, 81 mg, Oral, Daily  atorvastatin, 80 mg, Oral, Nightly  donepezil, 5 mg, Oral, Nightly  FLUoxetine, 20 mg, Oral, Daily  heparin (porcine), 5,000 Units, Subcutaneous, Q12H  insulin lispro, 0-7 Units, Subcutaneous, TID AC  magnesium sulfate, 4 g, Intravenous, Once  megestrol, 40 mg, Oral, Daily  metoclopramide, 5 mg, Intravenous, TID  multivitamin with minerals, 1 tablet, Oral, Daily  Nutrisource fiber, 1 packet, Oral, Daily  pantoprazole, 40 mg, Intravenous, BID AC  potassium phosphate, 30 mmol, Intravenous, Once  terazosin, 1 mg, Oral, Nightly      Continuous Infusions:   PRN Meds:.•  acetaminophen **OR** acetaminophen **OR** acetaminophen  •  albuterol sulfate HFA  •  calcium carbonate  •  cloNIDine  •  dextrose  •  dextrose  •  gabapentin  •  glucagon (human recombinant)  •  melatonin  •  metoclopramide  •  ondansetron **OR** ondansetron  •  potassium chloride **OR** potassium chloride **OR** potassium chloride  •  sodium chloride  •  traMADol    Assessment/Plan   Assessment & Plan     Active Hospital Problems    Diagnosis  POA   • Dehydration [E86.0]  Yes   • Nausea vomiting and diarrhea [R11.2, R19.7]  Yes   • Hypokalemia [E87.6]  Yes   • Elevated serum creatinine [R79.89]  Yes   • Thrombocytosis [D75.839]  Yes   • Hypercalcemia [E83.52]  Yes   • Elevated serum protein level [R77.9]  Unknown   • Leukocytosis [D72.829]  Unknown   • Gastroparesis [K31.84]  Yes   • Uncontrolled type 1 diabetes mellitus with hyperglycemia (HCC) [E10.65]  Yes   • Hypertensive urgency [I16.0]  Yes   •  "Hyperlipidemia [E78.5]  Yes   • Hypertension [I10]  Yes      Resolved Hospital Problems   No resolved problems to display.        Brief Hospital Course to date:  61 years old female patient with past medical history of type 1 diabetes, diabetic gastroparesis, dyslipidemia, GERD, essential hypertension,, depression and anxiety disorder who presented to the hospital with intractable nausea vomiting and diarrhea typical for her gastroparesis flare.     Assessment and plan:   Intractable nausea and vomiting secondary to diabetic gastroparesis  GERD  · GI team following   · Continue IV Reglan 5 mg daily.  Will need to go home on as needed subcutaneous Reglan  · Continue gingerroot 500 mg 3 times daily  · Continue Protonix IV twice daily  · GI team consulted and following   · Protonix IV twice daily  · Advance diet as tolerated  · Hold ferrous sulfate due to possible gastric irritation contributing to her nausea and vomiting  · GI recommended to discontinue THC due to possible component of cannabis hyperemesis  · Per GI note: \"lengthy conversation with patient in regard to gastroparesis and the gastroparesis diet.  Stress management strategies and meal planning thereof.  Patient voices understanding of gastroparesis diet. Gastroenterology office to arrange referral to the [facility] gastrointestinal motility clinic at discharge\"     Hypokalemia  Hypomagnesemia  · Replace    Type 1 diabetes  · A1c 10.3%  · Continue insulin through insulin pump, monitor fingersticks  · IV fluids for hydration and encourage p.o. intake     Uncontrolled hypertension  · Continue home dose amlodipine  · And clonidine 0.2 mg every 8 hours as needed     Hypokalemia  Hypercalcemia  Elevated creatinine  · All secondary to dehydration  · Continue IV fluids and monitor electrolytes with a.m. lab     Dyslipidemia  · Statins    DVT prophylaxis:  Medical DVT prophylaxis orders are present.     AM-PAC 6 Clicks Score (PT): 22 (02/12/22 " 1454)    Disposition: I expect the patient to be discharged home tomorrow     CODE STATUS:   Code Status and Medical Interventions:   Ordered at: 02/11/22 0441     Level Of Support Discussed With:    Patient     Code Status (Patient has no pulse and is not breathing):    CPR (Attempt to Resuscitate)     Medical Interventions (Patient has pulse or is breathing):    Full Support       Shirin Swenson MD  02/12/22

## 2022-02-12 NOTE — THERAPY DISCHARGE NOTE
Acute Care - Occupational Therapy Discharge  [facility]    Patient Name: Thelma Michael  : 1960    MRN: [id number]                              Today's Date: 2022       Admit Date: 2/10/2022    Visit Dx:     ICD-10-CM ICD-9-CM   1. Intractable nausea and vomiting  R11.2 536.2   2. Lower abdominal pain  R10.30 789.09   3. Elevated amylase  R74.8 790.5   4. Gastroparesis  K31.84 536.3   5. Acute renal insufficiency  N28.9 593.9   6. Dehydration  E86.0 276.51   7. Hypertensive urgency  I16.0 401.9   8. Uncontrolled type 1 diabetes mellitus with hyperglycemia (HCC)  E10.65 250.83     790.29   9. History of hyperlipidemia  Z86.39 V12.29   10. History of stroke  Z86.73 V12.54     Patient Active Problem List   Diagnosis   • Gastroesophageal reflux disease   • Back pain   • Diabetic peripheral neuropathy (HCC)   • Hyperlipidemia   • Hypertension   • Vitamin D deficiency   • Osteoporosis   • Tobacco use   • Personal history of cardiac murmur   • Pelvic pain   • History of sepsis   • Migraines   • Urinary retention   • Depression with anxiety   • Primary insomnia   • Iron deficiency anemia secondary to inadequate dietary iron intake   • Vertigo   • Acute UTI   • Severe malnutrition (HCC)   • Hypertensive urgency   • Uncontrolled type 1 diabetes mellitus with hyperglycemia (HCC)   • Diabetic ketoacidosis without coma associated with type 1 diabetes mellitus (HCC)   • Non-intractable vomiting   • History of TIAs   • Gastroparesis   • Hypokalemia   • Dehydration   • Nausea vomiting and diarrhea   • Hypokalemia   • Elevated serum creatinine   • Thrombocytosis   • Hypercalcemia   • Elevated serum protein level   • Leukocytosis     Past Medical History:   Diagnosis Date   • Acid reflux    • Acute bronchitis    • Cardiac murmur    • Diabetes mellitus (HCC)    • H/O echocardiogram 2012    i. LVEF 65%.ii. Mild LVH.iii. Borderline evidence of atrial septal aneurysm.  No PFO.    • History of nuclear stress  test 08/22/2014    Negative for ischemia and scars; LVEF 77%.     • Hyperlipidemia    • Hypertension    • Impacted cerumen of both ears    • Migraine    • Self-catheterizes urinary bladder    • Sinusitis    • Stroke (HCC)    • Tobacco abuse     quit 4 days ago.     • Urticaria      Past Surgical History:   Procedure Laterality Date   • CAPSULE ENDOSCOPY  7/27/2021    Procedure: PILLCAM DEPLOYMENT;  Surgeon: Mikael Worthy MD;  Location:  [facility];  Service: Gastroenterology;;   • COLONOSCOPY     • COLONOSCOPY N/A 7/27/2021    Procedure: COLONOSCOPY;  Surgeon: Mikael Worthy MD;  Location:  [facility];  Service: Gastroenterology;  Laterality: N/A;   • DENTAL PROCEDURE     • ENDOSCOPY N/A 6/30/2021    Procedure: ESOPHAGOGASTRODUODENOSCOPY;  Surgeon: Brunner, Mark I, MD;  Location:  [facility];  Service: Gastroenterology;  Laterality: N/A;   • ENDOSCOPY N/A 7/27/2021    Procedure: ESOPHAGOGASTRODUODENOSCOPY;  Surgeon: Mikael Worthy MD;  Location:  [facility];  Service: Gastroenterology;  Laterality: N/A;   • NO PAST SURGERIES        General Information     Row Name 02/12/22 1305          OT Time and Intention    Document Type discharge evaluation/summary  -TA     Mode of Treatment occupational therapy  -TA     Row Name 02/12/22 1308          General Information    Patient Profile Reviewed yes  -TA     Prior Level of Function independent:; all household mobility; community mobility; gait; transfer; bed mobility; ADL's; home management; cooking; using stairs; shopping  -TA     Existing Precautions/Restrictions no known precautions/restrictions  -TA     Row Name 02/12/22 1307          Occupational Profile    Reason for Services/Referral (Occupational Profile) possible fxl decline from PLOF  -TA     Patient Goals (Occupational Profile) Stay with sister for next month upon discharge  -TA     Row Name 02/12/22 1308          Living Environment    Lives With child(bronson), adult  -TA      Row Name 02/12/22 [facility]          Home Main Entrance    Number of Stairs, Main Entrance two  -TA     [facility] Name 02/12/22 [facility]          Cognition    Orientation Status (Cognition) oriented x 4  -TA     [facility] Name 02/12/22 [facility]          Safety Issues, Functional Mobility    Impairments Affecting Function (Mobility) endurance/activity tolerance  -TA           User Key  (r) = Recorded By, (t) = Taken By, (c) = Cosigned By    Initials Name Provider Type    Gene Urena, OT Occupational Therapist               Mobility/ADL's     [facility] 02/12/22 [facility]          Bed Mobility    Bed Mobility supine-sit  -TA     Supine-Sit [place] (Bed Mobility) independent  -TA     Row Name 02/12/22 [facility]          Transfers    Transfers sit-stand transfer; toilet transfer  -TA     Sit-Stand [place] (Transfers) independent  -TA     [place] Level (Toilet Transfer) independent  -TA     Assistive Device (Toilet Transfer) grab bars/safety frame  -TA     Row Name 02/12/22 [facility]          Sit-Stand Transfer    Assistive Device (Sit-Stand Transfers) --  No AD  -TA     Row Name 02/12/22 [facility]          Toilet Transfer    Type (Toilet Transfer) sit-stand; stand-sit  -TA     Row Name 02/12/22 [facility]          Functional Mobility    Functional Mobility- Ind. Level supervision required  No AD  -TA     Functional Mobility-Distance (Feet) 20  -TA     Functional Mobility- Comment PT to evaluate for AD if necessary  -TA     Row Name 02/12/22 [facility]          Activities of Daily Living    BADL Assessment/Intervention lower body dressing; toileting  -TA     Row Name 02/12/22 [facility]          Lower Body Dressing Assessment/Training    [place] Level (Lower Body Dressing) doff; don; pants/bottoms; socks; independent  -TA     Position (Lower Body Dressing) edge of bed sitting  -TA     Row Name 02/12/22 [facility]          Toileting Assessment/Training    [place] Level (Toileting) toileting skills; independent  -TA     Assistive Devices (Toileting) grab  bar/safety frame  -TA     Position (Toileting) unsupported sitting; unsupported standing  -TA     Comment (Toileting) simulation in BR  -TA           User Key  (r) = Recorded By, (t) = Taken By, (c) = Cosigned By    Initials Name Provider Type    Gene Urena OT Occupational Therapist               Obj/Interventions     Row Name 02/12/22 1305          Sensory Assessment (Somatosensory)    Sensory Assessment (Somatosensory) bilateral UE; sensation intact  -TA     Row Name 02/12/22 1305          Vision Assessment/Intervention    Visual Impairment/Limitations WFL  -TA     Row Name 02/12/22 1305          Range of Motion Comprehensive    General Range of Motion bilateral upper extremity ROM WFL  -TA     Row Name 02/12/22 1305          Strength Comprehensive (MMT)    General Manual Muscle Testing (MMT) Assessment no strength deficits identified  -TA     Comment, General Manual Muscle Testing (MMT) Assessment BUE 5/5  -TA     Row Name 02/12/22 1305          Balance    Balance Assessment sitting dynamic balance; standing dynamic balance  -TA     Dynamic Sitting Balance WFL; sitting, edge of bed  -TA     Dynamic Standing Balance WFL; unsupported; standing  -TA     Balance Interventions sit to stand; occupation based/functional task; weight shifting activity  -TA           User Key  (r) = Recorded By, (t) = Taken By, (c) = Cosigned By    Initials Name Provider Type    Gene Urena OT Occupational Therapist               Goals/Plan     Row Name 02/12/22 1305          Therapy Assessment/Plan (OT)    Planned Therapy Interventions (OT) patient/caregiver education/training  -TA           User Key  (r) = Recorded By, (t) = Taken By, (c) = Cosigned By    Initials Name Provider Type    Gene Urena OT Occupational Therapist               Clinical Impression     Row Name 02/12/22 1305          Pain Assessment    Additional Documentation Pain Scale: Numbers Pre/Post-Treatment (Group)  -TA     Row Name  02/12/22 1305          Pain Scale: Numbers Pre/Post-Treatment    Pretreatment Pain Rating 0/10 - no pain  -TA     Posttreatment Pain Rating 0/10 - no pain  -TA     Pre/Posttreatment Pain Comment Pt denied pain, tolerated  -TA     Pain Intervention(s) Ambulation/increased activity; Repositioned  -TA     Row Name 02/12/22 1305          Plan of Care Review    Plan of Care Reviewed With patient  -TA     Progress improving  -TA     Outcome Summary VSS; Pt presents at/near fxl baseline of independent with ADL performance, fxl mobility for ADLs. No AE/DME needs identified. No further acute skilled OT services indicated at this time. Recommend home at discharge.  -TA     Row Name 02/12/22 1305          Therapy Assessment/Plan (OT)    Patient/Family Therapy Goal Statement (OT) Stay with sister initally upon discharge.  -TA     Criteria for Skilled Therapeutic Interventions Met (OT) no; no problems identified which require skilled intervention  -TA     Therapy Frequency (OT) evaluation only  -TA     Predicted Duration of Therapy Intervention (OT) 1 day  -TA     Row Name 02/12/22 1305          Therapy Plan Review/Discharge Plan (OT)    Anticipated Discharge Disposition (OT) home  -TA     Row Name 02/12/22 1304          Vital Signs    Pre Systolic BP Rehab --  VSS; RN cleared pt for tx  -TA     O2 Delivery Pre Treatment room air  -TA     O2 Delivery Intra Treatment room air  -TA     O2 Delivery Post Treatment room air  -TA     Pre Patient Position Supine  -TA     Intra Patient Position Standing  -TA     Post Patient Position Sitting  -TA     Row Name 02/12/22 1305          Positioning and Restraints    Pre-Treatment Position in bed  -TA     Post Treatment Position bed  -TA     In Bed notified nsg; sitting EOB; call light within reach; encouraged to call for assist; side rails up x2  Meal tray set up in front of pt  -TA           User Key  (r) = Recorded By, (t) = Taken By, (c) = Cosigned By    Initials Name Provider Type    TA  Gene Hardy OT Occupational Therapist               Outcome Measures     Row Name 02/12/22 1305          How much help from another is currently needed...    Putting on and taking off regular lower body clothing? 4  -TA     Bathing (including washing, rinsing, and drying) 4  -TA     Toileting (which includes using toilet bed pan or urinal) 4  -TA     Putting on and taking off regular upper body clothing 4  -TA     Taking care of personal grooming (such as brushing teeth) 4  -TA     Eating meals 4  -TA     AM-PAC 6 Clicks Score (OT) 24  -TA     Row Name 02/12/22 1305          Functional Assessment    Outcome Measure Options AM-PAC 6 Clicks Daily Activity (OT)  -TA           User Key  (r) = Recorded By, (t) = Taken By, (c) = Cosigned By    Initials Name Provider Type    TA Gene Hardy OT Occupational Therapist              Occupational Therapy Education                 Title: PT OT SLP Therapies (Done)     Topic: Occupational Therapy (Done)     Point: ADL training (Done)     Description:   Instruct learner(s) on proper safety adaptation and remediation techniques during self care or transfers.   Instruct in proper use of assistive devices.              Learning Progress Summary           Patient Acceptance, E, VU by TA at 2/12/2022 1346                   Point: Precautions (Done)     Description:   Instruct learner(s) on prescribed precautions during self-care and functional transfers.              Learning Progress Summary           Patient Acceptance, E, VU by TA at 2/12/2022 1346                               User Key     Initials Effective Dates Name Provider Type Discipline    TA 06/16/21 -  Gene Hardy OT Occupational Therapist OT              OT Recommendation and Plan  Retired Outcome Summary/Treatment Plan (OT)  Anticipated Discharge Disposition (OT): home  Planned Therapy Interventions (OT): patient/caregiver education/training  Therapy Frequency (OT): evaluation only  Plan of Care  Review  Plan of Care Reviewed With: patient  Progress: improving  Outcome Summary: VSS; Pt presents at/near fxl baseline of independent with ADL performance, fxl mobility for ADLs. No AE/DME needs identified. No further acute skilled OT services indicated at this time. Recommend home at discharge.  Plan of Care Reviewed With: patient  Outcome Summary: VSS; Pt presents at/near fxl baseline of independent with ADL performance, fxl mobility for ADLs. No AE/DME needs identified. No further acute skilled OT services indicated at this time. Recommend home at discharge.     Time Calculation:    Time Calculation- OT     Row Name 02/12/22 1305             Time Calculation- OT    OT Start Time 1305  ttc 0 minutes  -TA      Total Timed Code Minutes- OT 0 minute(s)  -TA      OT Received On 02/12/22  -TA              Untimed Charges    OT Eval/Re-eval Minutes 49  -TA              Total Minutes    Untimed Charges Total Minutes 49  -TA       Total Minutes 49  -TA            User Key  (r) = Recorded By, (t) = Taken By, (c) = Cosigned By    Initials Name Provider Type    TA Gene Hardy OT Occupational Therapist              Therapy Charges for Today     Code Description Service Date Service Provider Modifiers Qty    [id number] HC OT EVAL MOD COMPLEXITY 4 2/12/2022 Gene Hardy OT GO 1               Gene Hardy OT  2/12/2022

## 2022-02-12 NOTE — PLAN OF CARE
Goal Outcome Evaluation:  Plan of Care Reviewed With: patient        Progress: no change  Outcome Summary: PT eval complete. Pt presented with decreased activity tolerance, generalized weakness, and mild balance impairments. Pt performed bed mobility independently. Pt amb 400' and navigated 13 steps with CGA and SPC. IPPT warranted to address identified deficits. Recommend d/c home with assist when medically ready.

## 2022-02-12 NOTE — PROGRESS NOTES
"GI Daily Progress Note  Subjective:    Chief Complaint: Follow-up nausea and vomiting    Thelma is resting in bed in no acute distress.  Notes she is feeling improved from yesterday.  No further nausea or vomiting reported but does feel some mild bloating and discomfort.  Otherwise, no new or worsening GI symptoms reported.  Does not endorse any acute pain at time of exam.    Objective:    /82 (BP Location: Left arm, Patient Position: Sitting)   Pulse 80   Temp 99 °F (37.2 °C) (Oral)   Resp 17   Ht 172.7 cm (68\")   Wt 54.9 kg (121 lb)   SpO2 99%   BMI 18.40 kg/m²     Physical Exam  Vitals and nursing note reviewed.   Constitutional:       General: She is not in acute distress.     Appearance: Normal appearance. She is normal weight. She is not ill-appearing or toxic-appearing.   HENT:      Head: Normocephalic and atraumatic.   Eyes:      General: No scleral icterus.     Extraocular Movements: Extraocular movements intact.      Conjunctiva/sclera: Conjunctivae normal.      Pupils: Pupils are equal, round, and reactive to light.   Cardiovascular:      Rate and Rhythm: Normal rate and regular rhythm.      Pulses: Normal pulses.      Heart sounds: Normal heart sounds.   Pulmonary:      Effort: Pulmonary effort is normal. No respiratory distress.      Breath sounds: Normal breath sounds.   Abdominal:      General: Abdomen is flat. Bowel sounds are normal. There is no distension.      Palpations: Abdomen is soft. There is no mass.      Tenderness: There is no abdominal tenderness. There is no guarding or rebound.      Hernia: No hernia is present.   Skin:     General: Skin is warm and dry.      Capillary Refill: Capillary refill takes less than 2 seconds.      Coloration: Skin is not jaundiced or pale.   Neurological:      General: No focal deficit present.      Mental Status: She is alert and oriented to person, place, and time.   Psychiatric:         Mood and Affect: Mood normal.         Behavior: " Behavior normal.         Thought Content: Thought content normal.         Judgment: Judgment normal.         Lab  I have personally reviewed most recent cardiac tracings, lab results and radiology images and interpretations and agree with findings.    Lab Results   Component Value Date    WBC 7.22 02/12/2022    HGB 11.3 (L) 02/12/2022    HGB 13.8 02/11/2022    HGB 15.2 02/11/2022    MCV 95.0 02/12/2022     02/12/2022    INR 0.95 05/20/2021    INR 1.10 10/31/2019    INR 0.9 10/31/2019    INR 1.01 08/05/2015    INR 1.04 08/17/2014       Lab Results   Component Value Date    GLUCOSE 129 (H) 02/12/2022    BUN 23 02/12/2022    CREATININE 1.06 (H) 02/12/2022    EGFRIFNONA 61 01/06/2015    EGFRIFAFRI 63 02/12/2022    BCR 21.7 02/12/2022     02/12/2022    K 3.7 02/12/2022    CO2 22.0 02/12/2022    CALCIUM 9.4 02/12/2022    PROTENTOTREF 7.6 01/06/2015    ALBUMIN 3.60 02/12/2022    ALKPHOS 73 02/12/2022    BILITOT 0.7 02/12/2022    ALT 9 02/12/2022    AST 16 02/12/2022     Assessment:  1.  Diabetic gastroparesis  2.  Nausea and vomiting, related to above  3.  Uncontrollable type 1 diabetes mellitus, A1c is 10  4.  Recreational THC use      Plan:  Patient symptoms related to gastroparesis are improving.  >>> Continue IV Reglan 5 mg 3 times daily; at discharge, patient is aware of as needed SQ protocol.  >>> Gingerroot 500 mg p.o. 3 times daily  >>> Continue twice daily PPI  >>> Lengthy conversation with patient in regard to gastroparesis and the gastroparesis diet.  Stress management strategies and meal planning thereof.  Patient voices understanding of gastroparesis diet.  >>> Gastroenterology office to arrange referral to the [facility] gastrointestinal motility clinic at discharge.    If patient continues to improve, anticipate discharge home tomorrow.  Patient instructed to keep follow-up appointment with Magali Pandey this Wednesday, 2/16/2022.      Terence White, APRN  02/12/22  16:06  EST

## 2022-02-12 NOTE — THERAPY EVALUATION
Patient Name: Thelma Michael  : 1960    MRN: [id number]                              Today's Date: 2022       Admit Date: 2/10/2022    Visit Dx:     ICD-10-CM ICD-9-CM   1. Intractable nausea and vomiting  R11.2 536.2   2. Lower abdominal pain  R10.30 789.09   3. Elevated amylase  R74.8 790.5   4. Gastroparesis  K31.84 536.3   5. Acute renal insufficiency  N28.9 593.9   6. Dehydration  E86.0 276.51   7. Hypertensive urgency  I16.0 401.9   8. Uncontrolled type 1 diabetes mellitus with hyperglycemia (HCC)  E10.65 250.83     790.29   9. History of hyperlipidemia  Z86.39 V12.29   10. History of stroke  Z86.73 V12.54     Patient Active Problem List   Diagnosis   • Gastroesophageal reflux disease   • Back pain   • Diabetic peripheral neuropathy (HCC)   • Hyperlipidemia   • Hypertension   • Vitamin D deficiency   • Osteoporosis   • Tobacco use   • Personal history of cardiac murmur   • Pelvic pain   • History of sepsis   • Migraines   • Urinary retention   • Depression with anxiety   • Primary insomnia   • Iron deficiency anemia secondary to inadequate dietary iron intake   • Vertigo   • Acute UTI   • Severe malnutrition (HCC)   • Hypertensive urgency   • Uncontrolled type 1 diabetes mellitus with hyperglycemia (HCC)   • Diabetic ketoacidosis without coma associated with type 1 diabetes mellitus (HCC)   • Non-intractable vomiting   • History of TIAs   • Gastroparesis   • Hypokalemia   • Dehydration   • Nausea vomiting and diarrhea   • Hypokalemia   • Elevated serum creatinine   • Thrombocytosis   • Hypercalcemia   • Elevated serum protein level   • Leukocytosis     Past Medical History:   Diagnosis Date   • Acid reflux    • Acute bronchitis    • Cardiac murmur    • Diabetes mellitus (HCC)    • H/O echocardiogram 2012    i. LVEF 65%.ii. Mild LVH.iii. Borderline evidence of atrial septal aneurysm.  No PFO.    • History of nuclear stress test 2014    Negative for ischemia and scars; LVEF 77%.      • Hyperlipidemia    • Hypertension    • Impacted cerumen of both ears    • Migraine    • Self-catheterizes urinary bladder    • Sinusitis    • Stroke (HCC)    • Tobacco abuse     quit 4 days ago.     • Urticaria      Past Surgical History:   Procedure Laterality Date   • CAPSULE ENDOSCOPY  7/27/2021    Procedure: PILLCAM DEPLOYMENT;  Surgeon: Mikael Worthy MD;  Location:  [facility];  Service: Gastroenterology;;   • COLONOSCOPY     • COLONOSCOPY N/A 7/27/2021    Procedure: COLONOSCOPY;  Surgeon: Mikael Worthy MD;  Location:  [facility];  Service: Gastroenterology;  Laterality: N/A;   • DENTAL PROCEDURE     • ENDOSCOPY N/A 6/30/2021    Procedure: ESOPHAGOGASTRODUODENOSCOPY;  Surgeon: Brunner, Mark I, MD;  Location:  [facility];  Service: Gastroenterology;  Laterality: N/A;   • ENDOSCOPY N/A 7/27/2021    Procedure: ESOPHAGOGASTRODUODENOSCOPY;  Surgeon: Mikael Worthy MD;  Location:  [facility];  Service: Gastroenterology;  Laterality: N/A;   • NO PAST SURGERIES        General Information     Row Name 02/12/22 1444          Physical Therapy Time and Intention    Document Type evaluation  -     Mode of Treatment physical therapy  -     Row Name 02/12/22 1444          General Information    Patient Profile Reviewed yes  -HP     Prior Level of Function independent:; all household mobility; community mobility; gait; transfer; bed mobility; ADL's; cooking; home management; work  -     Existing Precautions/Restrictions no known precautions/restrictions  -     Barriers to Rehab none identified  -HP     Row Name 02/12/22 1444          Living Environment    Lives With child(bronson), adult  -HP     Row Name 02/12/22 1444          Home Main Entrance    Number of Stairs, Main Entrance ten; other (see comments)  pt reported she has 6 steps plus ten steps to enter without HR. Landlord to put one in  -HP     Stair Railings, Main Entrance none  -HP     Row Name 02/12/22 1445           Stairs Within Home, Primary    Number of Stairs, Within Home, Primary none  -     Row Name 02/12/22 1444          Cognition    Orientation Status (Cognition) oriented x 4  -     Row Name 02/12/22 1444          Safety Issues, Functional Mobility    Safety Issues Affecting Function (Mobility) insight into deficits/self-awareness; awareness of need for assistance; safety precaution awareness  -     Impairments Affecting Function (Mobility) endurance/activity tolerance  -           User Key  (r) = Recorded By, (t) = Taken By, (c) = Cosigned By    Initials Name Provider Type     Myrtle Gonzalez, PT Physical Therapist               Mobility     Row Name 02/12/22 1446          Bed Mobility    Bed Mobility supine-sit; sit-supine; scooting/bridging  -     Scooting/Bridging [place] (Bed Mobility) independent  -     Supine-Sit [place] (Bed Mobility) independent  -     Sit-Supine [place] (Bed Mobility) independent  -     Comment (Bed Mobility) pt performed bed mobility without need for assistance  -     Row Name 02/12/22 1446          Transfers    Comment (Transfers) Pt performed STS with Sup for safety.  -     Row Name 02/12/22 1446          Sit-Stand Transfer    Sit-Stand [place] (Transfers) supervision  -     Row Name 02/12/22 1446          Gait/Stairs (Locomotion)    [place] Level (Gait) contact guard  -     Assistive Device (Gait) cane, straight  -     Distance in Feet (Gait) 400  -     Deviations/Abnormal Patterns (Gait) bilateral deviations; base of support, narrow; gait speed decreased; miguel decreased; stride length decreased  -     Bilateral Gait Deviations forward flexed posture  -     [place] Level (Stairs) contact guard  -     Assistive Device (Stairs) cane, straight  -     Handrail Location (Stairs) left side (ascending); left side (descending)  -     Number of Steps (Stairs) 13  -     Ascending Technique (Stairs) step-to-step  -      Descending Technique (Stairs) step-to-step  -     Comment (Gait/Stairs) Pt amb 400' and navigated 13 steps with SPC and HR. Pt demonstrated decreased gait speed and mild unsteadiness. Good safety awareness noted. SPC given  -           User Key  (r) = Recorded By, (t) = Taken By, (c) = Cosigned By    Initials Name Provider Type     Myrtle Gonzalez PT Physical Therapist               Obj/Interventions     Row Name 02/12/22 1449          Range of Motion Comprehensive    General Range of Motion no range of motion deficits identified  -     Row Name 02/12/22 1449          Strength Comprehensive (MMT)    General Manual Muscle Testing (MMT) Assessment lower extremity strength deficits identified  -     Comment, General Manual Muscle Testing (MMT) Assessment BLE grossly 4+/5  -     Row Name 02/12/22 1449          Balance    Balance Assessment sitting static balance; sitting dynamic balance; standing static balance; standing dynamic balance  -     Static Sitting Balance WFL; sitting, edge of bed  -     Dynamic Sitting Balance WFL; sitting, edge of bed  -     Static Standing Balance WFL; supported  -     Dynamic Standing Balance mild impairment; supported  -     Balance Interventions occupation based/functional task; sitting; standing; sit to stand  -           User Key  (r) = Recorded By, (t) = Taken By, (c) = Cosigned By    Initials Name Provider Type     Myrtle Gonzalez PT Physical Therapist               Goals/Plan     Row Name 02/12/22 1454          Transfer Goal 1 (PT)    Activity/Assistive Device (Transfer Goal 1, PT) sit-to-stand/stand-to-sit  -     [place] Level/Cues Needed (Transfer Goal 1, PT) modified independence; 1 person assist  -     Time Frame (Transfer Goal 1, PT) long term goal (LTG); 5 days  -     Progress/Outcome (Transfer Goal 1, PT) goal ongoing  -     Row Name 02/12/22 4083          Gait Training Goal 1 (PT)    Activity/Assistive Device (Gait Training Goal 1, PT)  gait (walking locomotion)  -HP     [place] Level (Gait Training Goal 1, PT) supervision required  -HP     Time Frame (Gait Training Goal 1, PT) long term goal (LTG); 5 days  -HP     Progress/Outcome (Gait Training Goal 1, PT) goal ongoing  -     Row Name 02/12/22 1453          Stairs Goal 1 (PT)    Activity/Assistive Device (Stairs Goal 1, PT) ascending stairs; descending stairs  -HP     [place] Level/Cues Needed (Stairs Goal 1, PT) contact guard assist  -HP     Number of Stairs (Stairs Goal 1, PT) 16  -HP     Time Frame (Stairs Goal 1, PT) long term goal (LTG); 5 days  -HP     Progress/Outcome (Stairs Goal 1, PT) goal ongoing  -HP           User Key  (r) = Recorded By, (t) = Taken By, (c) = Cosigned By    Initials Name Provider Type    HP Myrtle Gonzalez, PT Physical Therapist               Clinical Impression     Row Name 02/12/22 6519          Pain    Additional Documentation Pain Scale: Numbers Pre/Post-Treatment (Group)  -     Row Name 02/12/22 9919          Pain Scale: Numbers Pre/Post-Treatment    Pretreatment Pain Rating 0/10 - no pain  -     Posttreatment Pain Rating 0/10 - no pain  -HP     Row Name 02/12/22 1446          Plan of Care Review    Plan of Care Reviewed With patient  -HP     Progress no change  -     Outcome Summary PT eval complete. Pt presented with decreased activity tolerance, generalized weakness, and mild balance impairments. Pt performed bed mobility independently. Pt amb 400' and navigated 13 steps with CGA and SPC. IPPT warranted to address identified deficits. Recommend d/c home with assist when medically ready.  -     Row Name 02/12/22 5912          Therapy Assessment/Plan (PT)    Rehab Potential (PT) good, to achieve stated therapy goals  -     Criteria for Skilled Interventions Met (PT) yes; meets criteria; skilled treatment is necessary  -     Predicted Duration of Therapy Intervention (PT) 5 days  -     Row Name 02/12/22 0640          Vital Signs    Pre  Systolic BP Rehab --  VSS; Rn cleared for therapy  -HP     Pre Patient Position Supine  -HP     Intra Patient Position Standing  -HP     Post Patient Position Supine  -HP     Row Name 02/12/22 1449          Positioning and Restraints    Pre-Treatment Position in bed  -HP     Post Treatment Position bed  -HP     In Bed notified nsg; supine; fowlers; call light within reach; encouraged to call for assist  exit alarm unchanged  -HP           User Key  (r) = Recorded By, (t) = Taken By, (c) = Cosigned By    Initials Name Provider Type    Myrtle Kat, YUMI Physical Therapist               Outcome Measures     Row Name 02/12/22 1454          How much help from another person do you currently need...    Turning from your back to your side while in flat bed without using bedrails? 4  -HP     Moving from lying on back to sitting on the side of a flat bed without bedrails? 4  -HP     Moving to and from a bed to a chair (including a wheelchair)? 4  -HP     Standing up from a chair using your arms (e.g., wheelchair, bedside chair)? 4  -HP     Climbing 3-5 steps with a railing? 3  -HP     To walk in hospital room? 3  -HP     AM-PAC 6 Clicks Score (PT) 22  -     Row Name 02/12/22 1454 02/12/22 1305       Functional Assessment    Outcome Measure Options AM-PAC 6 Clicks Basic Mobility (PT)  -HP AM-PAC 6 Clicks Daily Activity (OT)  -TA          User Key  (r) = Recorded By, (t) = Taken By, (c) = Cosigned By    Initials Name Provider Type    Gene Urena OT Occupational Therapist    Myrtle Kat, YUMI Physical Therapist                             Physical Therapy Education                 Title: PT OT SLP Therapies (Done)     Topic: Physical Therapy (Done)     Point: Mobility training (Done)     Learning Progress Summary           Patient Acceptance, E,D, VU,DU by  at 2/12/2022 1455                   Point: Body mechanics (Done)     Learning Progress Summary           Patient Acceptance, E,D, VU,DU by  at  2/12/2022 1455                   Point: Precautions (Done)     Learning Progress Summary           Patient Acceptance, E,D, VU,DU by  at 2/12/2022 1455                               User Key     Initials Effective Dates Name Provider Type Discipline     06/01/21 -  Myrtle Gonzalez PT Physical Therapist PT              PT Recommendation and Plan  Planned Therapy Interventions (PT): balance training, bed mobility training, home exercise program, gait training, patient/family education, strengthening, stair training, stretching, transfer training  Plan of Care Reviewed With: patient  Progress: no change  Outcome Summary: PT eval complete. Pt presented with decreased activity tolerance, generalized weakness, and mild balance impairments. Pt performed bed mobility independently. Pt amb 400' and navigated 13 steps with CGA and SPC. IPPT warranted to address identified deficits. Recommend d/c home with assist when medically ready.     Time Calculation:    PT Charges     Row Name 02/12/22 1455             Time Calculation    Start Time 1356  -HP      PT Received On 02/12/22  -      PT Goal Re-Cert Due Date 02/22/22  -              Untimed Charges    PT Eval/Re-eval Minutes 47  -HP              Total Minutes    Untimed Charges Total Minutes 47  -HP       Total Minutes 47  -HP            User Key  (r) = Recorded By, (t) = Taken By, (c) = Cosigned By    Initials Name Provider Type     Myrtle Gonzalez PT Physical Therapist              Therapy Charges for Today     Code Description Service Date Service Provider Modifiers Qty    [id number] HC PT EVAL LOW COMPLEXITY 4 2/12/2022 Myrtle Gonzalez PT GP 1          PT G-Codes  Outcome Measure Options: AM-PAC 6 Clicks Basic Mobility (PT)  AM-PAC 6 Clicks Score (PT): 22  AM-PAC 6 Clicks Score (OT): 24    Myrtle Gonzalez PT  2/12/2022

## 2022-02-12 NOTE — PLAN OF CARE
Problem: Adult Inpatient Plan of Care  Goal: Plan of Care Review  Recent Flowsheet Documentation  Taken 2/12/2022 1305 by Gene Hardy, OT  Progress: improving  Plan of Care Reviewed With: patient  Outcome Summary:   VSS   Pt presents at/near fxl baseline of independent with ADL performance, fxl mobility for ADLs. No AE/DME needs identified. No further acute skilled OT services indicated at this time. Recommend home at discharge.   Goal Outcome Evaluation:  Plan of Care Reviewed With: patient        Progress: improving  Outcome Summary: VSS; Pt presents at/near fxl baseline of independent with ADL performance, fxl mobility for ADLs. No AE/DME needs identified. No further acute skilled OT services indicated at this time. Recommend home at discharge.

## 2022-02-13 ENCOUNTER — READMISSION MANAGEMENT (OUTPATIENT)
Dept: CALL CENTER | Facility: HOSPITAL | Age: 62
End: 2022-02-13

## 2022-02-13 VITALS
OXYGEN SATURATION: 99 % | BODY MASS INDEX: 19.94 KG/M2 | HEART RATE: 84 BPM | SYSTOLIC BLOOD PRESSURE: 170 MMHG | RESPIRATION RATE: 18 BRPM | WEIGHT: 131.6 LBS | DIASTOLIC BLOOD PRESSURE: 86 MMHG | TEMPERATURE: 97.7 F | HEIGHT: 68 IN

## 2022-02-13 LAB
ANION GAP SERPL CALCULATED.3IONS-SCNC: 10 MMOL/L (ref 5–15)
BASOPHILS # BLD AUTO: 0.01 10*3/MM3 (ref 0–0.2)
BASOPHILS NFR BLD AUTO: 0.2 % (ref 0–1.5)
BUN SERPL-MCNC: 23 MG/DL (ref 8–23)
BUN/CREAT SERPL: 21.3 (ref 7–25)
CALCIUM SPEC-SCNC: 8.4 MG/DL (ref 8.6–10.5)
CHLORIDE SERPL-SCNC: 107 MMOL/L (ref 98–107)
CO2 SERPL-SCNC: 25 MMOL/L (ref 22–29)
CREAT SERPL-MCNC: 1.08 MG/DL (ref 0.57–1)
DEPRECATED RDW RBC AUTO: 42.5 FL (ref 37–54)
EOSINOPHIL # BLD AUTO: 0.03 10*3/MM3 (ref 0–0.4)
EOSINOPHIL NFR BLD AUTO: 0.6 % (ref 0.3–6.2)
ERYTHROCYTE [DISTWIDTH] IN BLOOD BY AUTOMATED COUNT: 12.5 % (ref 12.3–15.4)
GFR SERPL CREATININE-BSD FRML MDRD: 62 ML/MIN/1.73
GLUCOSE SERPL-MCNC: 106 MG/DL (ref 65–99)
HCT VFR BLD AUTO: 30.8 % (ref 34–46.6)
HGB BLD-MCNC: 10.1 G/DL (ref 12–15.9)
IMM GRANULOCYTES # BLD AUTO: 0.01 10*3/MM3 (ref 0–0.05)
IMM GRANULOCYTES NFR BLD AUTO: 0.2 % (ref 0–0.5)
LYMPHOCYTES # BLD AUTO: 2.44 10*3/MM3 (ref 0.7–3.1)
LYMPHOCYTES NFR BLD AUTO: 48.5 % (ref 19.6–45.3)
MCH RBC QN AUTO: 30.3 PG (ref 26.6–33)
MCHC RBC AUTO-ENTMCNC: 32.8 G/DL (ref 31.5–35.7)
MCV RBC AUTO: 92.5 FL (ref 79–97)
MONOCYTES # BLD AUTO: 0.46 10*3/MM3 (ref 0.1–0.9)
MONOCYTES NFR BLD AUTO: 9.1 % (ref 5–12)
NEUTROPHILS NFR BLD AUTO: 2.08 10*3/MM3 (ref 1.7–7)
NEUTROPHILS NFR BLD AUTO: 41.4 % (ref 42.7–76)
NRBC BLD AUTO-RTO: 0 /100 WBC (ref 0–0.2)
PLATELET # BLD AUTO: 274 10*3/MM3 (ref 140–450)
PMV BLD AUTO: 10.8 FL (ref 6–12)
POTASSIUM SERPL-SCNC: 3.8 MMOL/L (ref 3.5–5.2)
RBC # BLD AUTO: 3.33 10*6/MM3 (ref 3.77–5.28)
SODIUM SERPL-SCNC: 142 MMOL/L (ref 136–145)
WBC NRBC COR # BLD: 5.03 10*3/MM3 (ref 3.4–10.8)

## 2022-02-13 PROCEDURE — 80048 BASIC METABOLIC PNL TOTAL CA: CPT | Performed by: INTERNAL MEDICINE

## 2022-02-13 PROCEDURE — 99212 OFFICE O/P EST SF 10 MIN: CPT | Performed by: NURSE PRACTITIONER

## 2022-02-13 PROCEDURE — 99217 PR OBSERVATION CARE DISCHARGE MANAGEMENT: CPT | Performed by: INTERNAL MEDICINE

## 2022-02-13 PROCEDURE — 25010000002 HEPARIN (PORCINE) PER 1000 UNITS: Performed by: NURSE PRACTITIONER

## 2022-02-13 PROCEDURE — 96376 TX/PRO/DX INJ SAME DRUG ADON: CPT

## 2022-02-13 PROCEDURE — 96372 THER/PROPH/DIAG INJ SC/IM: CPT

## 2022-02-13 PROCEDURE — G0378 HOSPITAL OBSERVATION PER HR: HCPCS

## 2022-02-13 PROCEDURE — 85025 COMPLETE CBC W/AUTO DIFF WBC: CPT | Performed by: INTERNAL MEDICINE

## 2022-02-13 PROCEDURE — 25010000002 METOCLOPRAMIDE PER 10 MG: Performed by: PHYSICIAN ASSISTANT

## 2022-02-13 RX ORDER — METOCLOPRAMIDE 10 MG/1
5 TABLET ORAL 2 TIMES DAILY
Qty: 90 TABLET | Refills: 1 | Status: SHIPPED | OUTPATIENT
Start: 2022-02-13 | End: 2022-02-15

## 2022-02-13 RX ADMIN — ASPIRIN 81 MG: 81 TABLET, COATED ORAL at 09:46

## 2022-02-13 RX ADMIN — HEPARIN SODIUM 5000 UNITS: 5000 INJECTION, SOLUTION INTRAVENOUS; SUBCUTANEOUS at 09:47

## 2022-02-13 RX ADMIN — PANTOPRAZOLE SODIUM 40 MG: 40 INJECTION, POWDER, FOR SOLUTION INTRAVENOUS at 09:46

## 2022-02-13 RX ADMIN — METOCLOPRAMIDE 5 MG: 5 INJECTION, SOLUTION INTRAMUSCULAR; INTRAVENOUS at 09:47

## 2022-02-13 RX ADMIN — OXYCODONE HYDROCHLORIDE AND ACETAMINOPHEN 500 MG: 500 TABLET ORAL at 09:46

## 2022-02-13 RX ADMIN — MEGESTROL ACETATE 40 MG: 40 TABLET ORAL at 09:50

## 2022-02-13 RX ADMIN — AMLODIPINE BESYLATE 10 MG: 10 TABLET ORAL at 09:46

## 2022-02-13 RX ADMIN — Medication 1 TABLET: at 09:46

## 2022-02-13 RX ADMIN — FLUOXETINE HYDROCHLORIDE 20 MG: 20 CAPSULE ORAL at 09:46

## 2022-02-13 RX ADMIN — GABAPENTIN 400 MG: 400 CAPSULE ORAL at 09:50

## 2022-02-13 NOTE — OUTREACH NOTE
Prep Survey      Responses   [facility] facility patient discharged from? [facility]   Is LACE score < 7 ? No   Emergency Room discharge w/ pulse ox? No   Eligibility [facility]   Date of Admission 02/10/22   Date of Discharge 02/13/22   Discharge Disposition Home or Self Care   Discharge diagnosis  nausea/vomiting/diarrhea   hypokalemia   Does the patient have one of the following disease processes/diagnoses(primary or secondary)? Other   Does the patient have Home health ordered? No   Is there a DME ordered? No   Prep survey completed? Yes          Alejandra Pugh RN

## 2022-02-13 NOTE — DISCHARGE SUMMARY
[facility] Medicine Services  DISCHARGE SUMMARY    Patient Name: Thelma Michael  : 1960  MRN: [id number]    Date of Admission: 2/10/2022 11:23 PM  Date of Discharge:  2022  Primary Care Physician: Monet Howe APRN    Consults     Date and Time Order Name Status Description    2022  7:09 AM Inpatient Gastroenterology Consult Completed         Hospital Course     Presenting Problem:   Dehydration [E86.0]    Active Hospital Problems    Diagnosis  POA   • Dehydration [E86.0]  Yes   • Nausea vomiting and diarrhea [R11.2, R19.7]  Yes   • Hypokalemia [E87.6]  Yes   • Elevated serum creatinine [R79.89]  Yes   • Thrombocytosis [D75.839]  Yes   • Hypercalcemia [E83.52]  Yes   • Elevated serum protein level [R77.9]  Unknown   • Leukocytosis [D72.829]  Unknown   • Gastroparesis [K31.84]  Yes   • Uncontrolled type 1 diabetes mellitus with hyperglycemia (HCC) [E10.65]  Yes   • Hypertensive urgency [I16.0]  Yes   • Hyperlipidemia [E78.5]  Yes   • Hypertension [I10]  Yes      Resolved Hospital Problems   No resolved problems to display.      Hospital Course:  61 years old female patient with past medical history of type 1 diabetes, diabetic gastroparesis, dyslipidemia, GERD, essential hypertension,, depression and anxiety disorder who presented to the hospital with intractable nausea vomiting and diarrhea typical for her gastroparesis flare.  Her symptoms markedly improved with IV Reglan and gingerroot tablets.  Able to tolerate diet well and was discharged home with p.o. Reglan and close follow-up with GI service next week to arrange for subcutaneous Reglan regimen.     Assessment and plan:   Intractable nausea and vomiting secondary to diabetic gastroparesis  GERD  · Symptoms markedly improved with IV Reglan 5 mg 3 times daily and gingerroot 500 mg 3 times daily as well as Protonix IV twice daily  · GI team consulted and following   · Tolerated diet prior to  "discharge with minimal GI symptoms  · GI recommended to discontinue THC due to possible component of cannabis hyperemesis  · Per GI note: \"lengthy conversation with patient in regard to gastroparesis and the gastroparesis diet.  Stress management strategies and meal planning thereof.  Patient voices understanding of gastroparesis diet. Gastroenterology office to arrange referral to the [facility] gastrointestinal motility clinic at discharge\"  · GI team also recommended subcutaneous Reglan regimen but when I [facility] and our pharmacy here in the hospital, seems that is not available and regular retail pharmacies.  I spoke to Mr. Pratt with GI team and he told me that this is probably available through special order through [facility] pharmacy and GI clinic will arrange for it next week when the patient goes for her appointment.  Meanwhile she will be discharged on oral Reglan     Hypokalemia  Hypomagnesemia  · Replaced     Type 1 diabetes  · A1c 10.3%  · Continue insulin through insulin pump, monitor fingersticks  · IV fluids for hydration and encourage p.o. intake     Uncontrolled hypertension  · Continue home dose amlodipine  · And clonidine 0.2 mg every 8 hours as needed     Hypokalemia  Hypercalcemia  Elevated creatinine  · All secondary to dehydration  · Continue IV fluids and monitor electrolytes with a.m. lab     Dyslipidemia  · Statins     Discharge Follow Up Recommendations for outpatient labs/diagnostics:  PCP in 1 week  Dannie/ GI in 1 week    Day of Discharge     HPI:   I have seen and evaluated the patient this morning.  Comfortable in bed.  Denied any abdominal pain, nausea or vomiting or diarrhea.  No other acute complaints.  Reported that she is ready to go home    Review of Systems  10 point review of systems is negative except for what is mentioned in HPI    Vital Signs:   Temp:  [97.7 °F (36.5 °C)-99.5 °F (37.5 °C)] 97.7 °F (36.5 °C)  Heart Rate:  [68-88] 84  Resp:  [18] " 18  BP: (109-170)/(52-86) 170/86    Physical Exam:  General: Comfortable, not in any acute distress, appears stated age, conversant and cooperative  Head: Atraumatic and normocephalic, without obvious abnormality  Eyes:   Conjunctivae and sclerae normal, no Icterus. No pallor  Ears:  Ears appear intact with no abnormalities noted  Throat: No oral lesions, no thrush, oral mucosa moist  Neck: Supple, trachea midline, no thyromegaly  Back:   No kyphoscoliosis present. No tenderness to palpation,   no sacral edema  Lungs: Clear to auscultation bilaterally, equal air entry, no wheezing or crackles  Heart:  Normal S1 and S2, no murmur, no gallop, No JVD, no lower extremity swelling  Abdomen:  Soft, no tenderness, no organomegaly, normal bowel sounds  Normal bowel sounds, no masses, no organomegaly. Soft, nontender, nondistended, no guarding, no rebound tenderness.  Extremities: No gross abnormalities, no clubbing, pulses palpable and equal bilaterally  Skin: No bleeding, bruising or rash, normal skin turgor and elasticity  Neurologic: Cranial nerves appear intact with no evidence of facial asymmetry, normal motor and sensory functions in all 4 extremities  Psych: Alert and oriented x 3, normal mood    Pertinent  and/or Most Recent Results     LAB RESULTS:      Lab 02/13/22  0410 02/12/22  0309 02/11/22  0814 02/11/22  0401 02/11/22  0051   WBC 5.03 7.22 10.84*  --  10.95*   HEMOGLOBIN 10.1* 11.3* 13.8  --  15.2   HEMATOCRIT 30.8* 35.8 40.6  --  43.6   PLATELETS 274 307 380  --  481*   NEUTROS ABS 2.08 4.28 7.86*  --  8.88*   IMMATURE GRANS (ABS) 0.01 0.03 0.02  --  0.04   LYMPHS ABS 2.44 2.18 2.20  --  1.47   MONOS ABS 0.46 0.56 0.76  --  0.55   EOS ABS 0.03 0.16 0.00  --  0.00   MCV 92.5 95.0 89.2  --  86.0   SED RATE  --   --  27  --   --    CRP  --   --  0.34  --   --    PROCALCITONIN  --   --   --   --  0.11   LACTATE  --   --   --  1.4  --          Lab 02/13/22  0410 02/12/22  1236 02/11/22  1651 02/11/22  0814  02/11/22  0602 02/11/22 0051   SODIUM 142 139  --  143  --  141   POTASSIUM 3.8 3.7 2.8* 3.5  --  2.8*   CHLORIDE 107 105  --  105  --  97*   CO2 25.0 22.0  --  23.0  --  27.0   ANION GAP 10.0 12.0  --  15.0  --  17.0*   BUN 23 23  --  21  --  20   CREATININE 1.08* 1.06*  --  1.07*  --  1.11*   GLUCOSE 106* 129*  --  133*  --  147*   CALCIUM 8.4* 9.4  --  9.5  --  11.1*   IONIZED CALCIUM  --   --   --   --  1.25  --    MAGNESIUM  --  1.8  --  1.9  --  2.1   PHOSPHORUS  --  1.8*  --   --   --   --    HEMOGLOBIN A1C  --   --   --  10.30*  --   --          Lab 02/12/22  1236 02/11/22  0814 02/11/22 0051   TOTAL PROTEIN 5.9* 7.2 9.0*   ALBUMIN 3.60 4.20 5.10   GLOBULIN 2.3 3.0 3.9   ALT (SGPT) 9 9 12   AST (SGOT) 16 14 18   BILIRUBIN 0.7 0.6 0.6   ALK PHOS 73 91 117   AMYLASE  --   --  168*   LIPASE  --   --  11*         Lab 02/11/22 0051   TROPONIN T 0.012             Lab 02/11/22 0814   IRON 68   IRON SATURATION 19*   TIBC 359   TRANSFERRIN 241   FERRITIN 79.21         Brief Urine Lab Results  (Last result in the past 365 days)      Color   Clarity   Blood   Leuk Est   Nitrite   Protein   CREAT   Urine HCG        02/11/22 0745 Yellow   Clear   Small (1+)   Negative   Negative   >=300 mg/dL (3+)               Microbiology Results (last 10 days)     Procedure Component Value - Date/Time    COVID PRE-OP / PRE-PROCEDURE SCREENING ORDER (NO ISOLATION) - Swab, Nasopharynx [[id number]]  (Normal) Collected: 02/11/22 0051    Lab Status: Final result Specimen: Swab from Nasopharynx Updated: 02/11/22 0200    Narrative:      The following orders were created for panel order COVID PRE-OP / PRE-PROCEDURE SCREENING ORDER (NO ISOLATION) - Swab, Nasopharynx.  Procedure                               Abnormality         Status                     ---------                               -----------         ------                     COVID-19 and FLU A/B PCR...[[id number]]  Normal              Final result                 Please view  results for these tests on the individual orders.    COVID-19 and FLU A/B PCR - Swab, Nasopharynx [[id number]]  (Normal) Collected: 02/11/22 0051    Lab Status: Final result Specimen: Swab from Nasopharynx Updated: 02/11/22 0200     COVID19 Not Detected     Influenza A PCR Not Detected     Influenza B PCR Not Detected    Narrative:      Fact sheet for providers: https://www.fda.gov/media/332095/download    Fact sheet for patients: https://www.fda.gov/media/650389/download    Test performed by PCR.        CT Abdomen Pelvis Without Contrast    Result Date: 2/11/2022  EXAM: CT OF THE ABDOMEN AND PELVIS WITHOUT IV CONTRAST INDICATIONS: Lower abdominal pain, nausea, vomiting TECHNIQUE: CT of the abdomen and pelvis was performed without the administration of intravenous or oral contrast. CT dose lowering techniques were used, to include: automated exposure control, adjustment for patient size, and / or use of iterative reconstruction. COMPARISON: 6/27/2021 FINDINGS: Bones: No destructive osseous lesions. Lung bases: Unremarkable ABDOMEN: Liver: Unremarkable in noncontrast appearance. Gallbladder and Bile Ducts: Unremarkable CT appearance of the gallbladder. There is no intrahepatic or extrahepatic biliary ductal dilatation. Spleen: Unremarkable in noncontrast appearance. Pancreas: The pancreatic parenchyma is unremarkable. There is no pancreatic ductal dilatation. Adrenals: Bilateral adrenal adenomas are present. Kidneys: There is no hydronephrosis or nephrolithiasis. A hypoattenuating structure in the left kidney is too small to characterize. Vasculature: There are atherosclerotic calcifications throughout the abdomen and pelvis, without aneurysm. Nodes: There is no lymphadenopathy by size criteria. Bowel: There is no bowel obstruction. There is scattered diverticulosis without evidence of acute diverticulitis. An unremarkable appendix is identified. Mesentery/Peritoneum: Unremarkable Soft Tissues: Unremarkable PELVIS:  Pelvic Organs: There is no abnormal pelvic mass. A somewhat lobular urinary bladder is present, nonspecific. This may represent neurogenic bladder.     1. No evidence of acute disease in the abdomen or pelvis. 2. Scattered colonic diverticula. 3. Bilateral adrenal adenomas. 4. Additional chronic findings as above. Electronically signed by:  Yg Manzo M.D.  2/10/2022 11:23 PM Mountain Time    XR Chest 1 View    Result Date: 2/11/2022  Examination: AP view of the chest Indication: Dizziness/weakness Comparison: 1/8/2022 Findings: The cardiomediastinal silhouette is within normal size limits. There is no consolidative airspace disease, pleural effusion or pulmonary edema. There is no pneumothorax. No acute osseous abnormality is identified. There is scoliotic curvature of the thoracic spine.     Impression: No evidence of an acute pleural or pulmonary parenchymal abnormality. Electronically signed by:  Yg Manzo M.D.  2/10/2022 10:47 PM Mountain Time    Results for orders placed during the hospital encounter of 11/19/19    Adult Transesophageal Echo (LIZANDRO) W/ Cont if Necessary Per Protocol    Interpretation Summary  · Left ventricular systolic function is normal. Estimated EF = 65%.  · Left ventricular wall thickness is consistent with hypertrophy.  · Small patent foramen ovale present. Saline test results are positive with valsalva manuever.  · There is mild mitral valve prolapse of the anterior mitral leaflet.  · Mild tricuspid valve regurgitation is present.  · Estimated right ventricular systolic pressure from tricuspid regurgitation is mildly elevated (35-45 mmHg).  · There is mild plaque in the descending aorta present.    Plan for Follow-up of Pending Labs/Results:    Discharge Details        Discharge Medications      Continue These Medications      Instructions Start Date   BD Pen Needle Cydney U/F 32G X 4 MM misc  Generic drug: Insulin Pen Needle   1 each, Oral, 4 Times Daily      Dexcom G6   device   1 kit, Does not apply, Every 3 Months      Dexcom G6 Sensor   Does not apply, Every 10 Days      Dexcom G6 Transmitter misc   1 kit, Does not apply, Every 3 Months      glucose monitor monitoring kit   1 each, Does not apply, 4 Times Daily         ASK your doctor about these medications      Instructions Start Date   acetaminophen 325 MG tablet  Commonly known as: TYLENOL   650 mg, Oral, Every 4 Hours PRN      albuterol sulfate  (90 Base) MCG/ACT inhaler  Commonly known as: PROVENTIL HFA;VENTOLIN HFA;PROAIR HFA   2 puffs, Inhalation, Every 4 Hours PRN      amLODIPine 10 MG tablet  Commonly known as: NORVASC   10 mg, Oral, Daily      aspirin 81 MG EC tablet   81 mg, Oral, Daily      atorvastatin 80 MG tablet  Commonly known as: LIPITOR   80 mg, Oral, Nightly      BASAGLAR KWIKPEN 100 UNIT/ML injection pen   20 Units, Subcutaneous, Nightly      Benefiber Drink Mix pack   1 Scoop, Oral, Daily      calcium carbonate 500 MG chewable tablet  Commonly known as: TUMS   2 tablets, Oral, 3 Times Daily PRN      donepezil 5 MG tablet  Commonly known as: Aricept   5 mg, Oral, Nightly      doxazosin 1 MG tablet  Commonly known as: CARDURA   1 mg, Oral, Nightly      ferrous sulfate 325 (65 FE) MG tablet   325 mg, Oral, Daily With Breakfast, Take with orange juice or vitamin C      FLUoxetine 20 MG capsule  Commonly known as: PROzac   20 mg, Oral, Daily      gabapentin 400 MG capsule  Commonly known as: NEURONTIN   400 mg, Oral, 2 Times Daily PRN      glucose blood test strip  Commonly known as: Glucose Meter Test   Use as instructed to check blood glucose levels 3 times daily      glucose blood test strip   Use as instructed      Insulin Lispro (1 Unit Dial) 100 UNIT/ML solution pen-injector  Commonly known as: HumaLOG KwikPen   15 units tid      megestrol 40 MG tablet  Commonly known as: MEGACE   40 mg, Oral, Daily      melatonin 5 MG tablet tablet   5 mg, Oral, Nightly PRN      multivitamin tablet tablet   1  tablet, Oral, Daily      Multivitamin-Minerals tablet   1 tablet, Oral, Daily      ondansetron 4 MG tablet  Commonly known as: ZOFRAN   4 mg, Oral, Every 6 Hours PRN      pantoprazole 40 MG EC tablet  Commonly known as: PROTONIX   40 mg, Oral, Daily      sennosides-docusate 8.6-50 MG per tablet  Commonly known as: PERICOLACE   2 tablets, Oral, 2 Times Daily PRN      traMADol 50 MG tablet  Commonly known as: ULTRAM   50 mg, Oral, Every 6 Hours PRN      traZODone 50 MG tablet  Commonly known as: DESYREL   TAKE 1-2 TABLETS ONE HOUR BEFORE BEDTIME AS NEEDED FOR SLEEP.      VITAMIN C PO   Vitamin C TABS      vitamin D 1.25 MG (54335 UT) capsule capsule  Commonly known as: ERGOCALCIFEROL   Take 1 capsule by mouth once a week           No Known Allergies    Discharge Disposition:   Home     Diet:  Hospital:  Diet Order   Procedures   • Diet Regular; [place]     Activity:   As tolerated     Restrictions or Other Recommendations:  None        CODE STATUS:    Code Status and Medical Interventions:   Ordered at: 02/11/22 0441     Level Of Support Discussed With:    Patient     Code Status (Patient has no pulse and is not breathing):    CPR (Attempt to Resuscitate)     Medical Interventions (Patient has pulse or is breathing):    Full Support       Future Appointments   Date Time Provider Department Center   2/16/2022 10:00 AM Magali Pandey APRN [facility]   4/6/2022  2:45 PM Gerson Ochoa MD [facility]   4/14/2022  1:00 PM Monet Howe APRN [facility]     Shirin Swenson MD  02/13/22      Time Spent on Discharge:  I spent  29 minutes on this discharge activity which included: face-to-face encounter with the patient, reviewing the data in the system, coordination of the care with the nursing staff as well as consultants, documentation, and entering orders.

## 2022-02-13 NOTE — PROGRESS NOTES
Malnutrition Severity Assessment    Patient Name:  Thelma Michael  YOB: 1960  MRN: [id number]  Admit Date:  2/10/2022    Patient meets criteria for : Moderate (non-severe) Malnutrition (Pt meets criteria for non severe chronic malnutrition based on mild wasting.)    Comments:      Malnutrition Severity Assessment  Malnutrition Type: Chronic Disease - Related Malnutrition  Malnutrition Type (last 8 hours)     Malnutrition Severity Assessment     Row Name 02/12/22 2030       Malnutrition Severity Assessment    Malnutrition Type Chronic Disease - Related Malnutrition    Row Name 02/12/22 2030       Insufficient Energy Intake     Insufficient Energy Intake Findings --  unable to quantify    Row Name 02/12/22 2030       Unintentional Weight Loss     Unintentional Weight Loss Findings --  Loss of 10% body wt over 7 mo 3% over 1 mo w dehydration    Row Name 02/12/22 2030       Muscle Loss    Loss of Muscle Mass Findings Mild    Yarsani Region None    Clavicle Bone Region Moderate - some protrusion in females, visible in males    Acromion Bone Region --  mild    Scapular Bone Region --  mild    Dorsal Hand Region None    Patellar Region None    Anterior Thigh Region --  mild    Posterior Calf Region --  mild    Row Name 02/12/22 2030       Fat Loss    Subcutaneous Fat Loss Findings Mild    Orbital Region  --  mild    Upper Arm Region --  mild    Thoracic & Lumbar Region --  mild    Row Name 02/12/22 2030       Criteria Met (Must meet criteria for severity in at least 2 of these categories: M Wasting, Fat Loss, Fluid, Secondary Signs, Wt. Status, Intake)    Patient meets criteria for  Moderate (non-severe) Malnutrition  Pt meets criteria for non severe chronic malnutrition based on mild wasting.                Electronically signed by:  Ella Mcmanus RD  02/12/22 20:36 EST

## 2022-02-13 NOTE — PROGRESS NOTES
"                  Clinical Nutrition     Nutrition Assessment  Reason for Visit:   Identified at risk by screening criteria, MST score 2+, BMI, Malnutrition Severity Assessment      Patient Name: Thelma Michael  YOB: 1960  MRN: [id number]  Date of Encounter: 02/12/22 20:15 EST  Admission date: 2/10/2022      Comments:  Materials given re diet for gastroparesis. Pt allows will use.    Pt meets criteria for non severe chronic malnutrition based on mild wasting. See flowsheet note.      Admission Diagnosis    Dehydration [E86.0]     Hospital Problem List    Hyperlipidemia    Hypertension    Hypertensive urgency    Uncontrolled type 1 diabetes mellitus with hyperglycemia (HCC)    Gastroparesis    Dehydration    Nausea vomiting and diarrhea    Hypokalemia    Elevated serum creatinine    Thrombocytosis    Hypercalcemia    Elevated serum protein level    Leukocytosis        Applicable Interval History:      Applicable PMH/PSxH:     PMH: She  has a past medical history of Acid reflux, Acute bronchitis, Cardiac murmur, Diabetes mellitus (HCC), H/O echocardiogram (08/07/2012), History of nuclear stress test (08/22/2014), Hyperlipidemia, Hypertension, Impacted cerumen of both ears, Migraine, Self-catheterizes urinary bladder, Sinusitis, Stroke (HCC), Tobacco abuse, and Urticaria.   PSxH: She  has a past surgical history that includes No past surgeries; Dental surgery; Colonoscopy; Esophagogastroduodenoscopy (N/A, 6/30/2021); Colonoscopy (N/A, 7/27/2021); Esophagogastroduodenoscopy (N/A, 7/27/2021); and Capsule Endoscopy (7/27/2021).         Diet/Nutrition Related History:     Pt allows tries to eat but difficult to gain wt. Will study gastroparesis diet material and use.     Anthropometrics     Admission Height 172.7 cm (68\") Documented at 02/10/2022 2325   Admission Weight 56.7 kg (125 lb) Documented at 02/10/2022 2325        Height: 172.7 cm (68\")    Last filed wt: Weight: 54.9 kg (121 lb) (02/11/22 " 1447)  Weight Method: Standing scale    BMI: BMI (Calculated): 18.4  Underweight:<18.5kg/m2    Ideal Body Weight (IBW) (kg): 64.15    Weight Change   UBW: 134 lbs in July 2021, 125 lbs on 1/14/2022  Weight change: 13 lbs; 4 lbs   % wt change: 10%; 3%  Time frame of weight loss: 7 mo; 1 mo - w noted dehydration     Labs reviewed   Yes  Hgb A1c 10.3    Medications reviewed   Yes  Vitamin C, Megace, Multi vit min, Protonix     Intake/Ouptut 24 hrs reviewed   Yes  Pertinent:        Nutrition Focused Physical Exam     Pt meets criteria for non severe chronic malnutrition based on mild wasting. See flowsheet note.      Current Nutrition Prescription     PO: Diet Regular; Pepin  Orders Placed This Encounter      Dietary Nutrition Supplements Boost Glucose Control; vanilla    TID     Intake: insuffic data 25% x 1 meal      Nutrition Diagnosis     2/12  Problem Malnutrition  non severe chronic   Etiology Effects gastroparesis   Signs/Symptoms Mild wasting   Status:  2/12  Problem Food and nutrition knowledge deficit   R/t  Gastroparesis   Signs/Symptoms rpted symptoms   Status: Materials given w brief explanation. Pt allows will use material     Goal:   General: Nutrition to support treatment, Provide information regarding MNT therapy  PO: Increase intake  Additional goals:      Nutrition Intervention     Follow treatment progress, Care plan reviewed, Advise alternate selection, Menu provided, Education provided re  Diet rationale, Key food habit change and Gastroparesis   including info re Food Choices   Materials from [employer] Nutrition Care Manual.    Monitoring/Evaluation:   Per protocol, PO intake, Supplement intake, Pertinent labs, Weight, GI status, Symptoms    Pt acknowledged will use education materials.       Ella Mcmanus RD,   Time Spent: 30 min

## 2022-02-13 NOTE — PLAN OF CARE
Problem: Adult Inpatient Plan of Care  Goal: Absence of Hospital-Acquired Illness or Injury  Intervention: Identify and Manage Fall Risk  Recent Flowsheet Documentation  Taken 2/13/2022 1000 by Jomar Klein, RN  Safety Promotion/Fall Prevention:   activity supervised   assistive device/personal items within reach   clutter free environment maintained   fall prevention program maintained   nonskid shoes/slippers when out of bed   room organization consistent   safety round/check completed  Taken 2/13/2022 0851 by Jomar Klein, RN  Safety Promotion/Fall Prevention:   activity supervised   assistive device/personal items within reach   clutter free environment maintained   fall prevention program maintained   nonskid shoes/slippers when out of bed   room organization consistent   safety round/check completed     Problem: Adult Inpatient Plan of Care  Goal: Absence of Hospital-Acquired Illness or Injury  Intervention: Prevent Infection  Recent Flowsheet Documentation  Taken 2/13/2022 1000 by Jomar Klein, RN  Infection Prevention:   environmental surveillance performed   hand hygiene promoted   equipment surfaces disinfected   rest/sleep promoted   single patient room provided   visitors restricted/screened   personal protective equipment utilized  Taken 2/13/2022 0851 by Jomar Klein, RN  Infection Prevention:   environmental surveillance performed   equipment surfaces disinfected   hand hygiene promoted   personal protective equipment utilized   rest/sleep promoted   visitors restricted/screened   single patient room provided   Goal Outcome Evaluation:

## 2022-02-13 NOTE — PROGRESS NOTES
"GI Daily Progress Note  Subjective:    Chief Complaint: Follow-up gastroparesis    Patient resting up in bed no acute distress.  Notes she is tolerating GI soft diet well.  No further nausea or vomiting reported.  Has questions regarding gastroparesis diet at time of exam.  Denies pain.    Objective:    /86 (BP Location: Left arm, Patient Position: Sitting)   Pulse 84   Temp 97.7 °F (36.5 °C) (Axillary)   Resp 18   Ht 172.7 cm (68\")   Wt 59.7 kg (131 lb 9.6 oz)   SpO2 99%   BMI 20.01 kg/m²     Physical Exam  Vitals and nursing note reviewed.   Constitutional:       General: She is not in acute distress.     Appearance: Normal appearance. She is normal weight. She is not ill-appearing or toxic-appearing.   HENT:      Head: Normocephalic and atraumatic.   Eyes:      General: No scleral icterus.     Extraocular Movements: Extraocular movements intact.      Conjunctiva/sclera: Conjunctivae normal.      Pupils: Pupils are equal, round, and reactive to light.   Cardiovascular:      Rate and Rhythm: Normal rate and regular rhythm.      Pulses: Normal pulses.      Heart sounds: Normal heart sounds.   Pulmonary:      Effort: Pulmonary effort is normal. No respiratory distress.      Breath sounds: Normal breath sounds.   Abdominal:      General: Abdomen is flat. Bowel sounds are normal. There is no distension.      Palpations: Abdomen is soft. There is no mass.      Tenderness: There is no abdominal tenderness. There is no guarding or rebound.      Hernia: No hernia is present.   Skin:     General: Skin is warm and dry.      Capillary Refill: Capillary refill takes less than 2 seconds.      Coloration: Skin is not jaundiced or pale.   Neurological:      General: No focal deficit present.      Mental Status: She is alert and oriented to person, place, and time.   Psychiatric:         Mood and Affect: Mood normal.         Behavior: Behavior normal.         Thought Content: Thought content normal.         Judgment: " Judgment normal.         Lab  I have personally reviewed most recent cardiac tracings, lab results and radiology images and interpretations and agree with findings.    Lab Results   Component Value Date    WBC 5.03 02/13/2022    HGB 10.1 (L) 02/13/2022    HGB 11.3 (L) 02/12/2022    HGB 13.8 02/11/2022    MCV 92.5 02/13/2022     02/13/2022    INR 0.95 05/20/2021    INR 1.10 10/31/2019    INR 0.9 10/31/2019    INR 1.01 08/05/2015    INR 1.04 08/17/2014       Lab Results   Component Value Date    GLUCOSE 106 (H) 02/13/2022    BUN 23 02/13/2022    CREATININE 1.08 (H) 02/13/2022    EGFRIFNONA 61 01/06/2015    EGFRIFAFRI 62 02/13/2022    BCR 21.3 02/13/2022     02/13/2022    K 3.8 02/13/2022    CO2 25.0 02/13/2022    CALCIUM 8.4 (L) 02/13/2022    PROTENTOTREF 7.6 01/06/2015    ALBUMIN 3.60 02/12/2022    ALKPHOS 73 02/12/2022    BILITOT 0.7 02/12/2022    ALT 9 02/12/2022    AST 16 02/12/2022     Assessment:  1.  Diabetic gastroparesis, improving  2.  Nausea and vomiting, related to above, resolved  3.  Uncontrollable type 1 diabetes mellitus, A1c is 10  4.  Recreational THC use    Plan:  Thelma is doing well today.  Tolerating advancement in diet well.  >>> Continue Reglan; will provide p.o. Reglan at discharge as subcu was unavailable at this time.  Office to arrange.  >>> Gingerroot 5 mg p.o. 3 times daily  >>> Clinical nutrition provided patient with gastroparesis diet packet.  I discussed the details of this packet in great detail with patient.  We discussed:   -Eating 4-6 small meals per day   - liquids from mealtime   -Avoiding acidic foods and carbonated beverages   -Do not eat skin on fruits and vegetables; all vegetables to be cooked fully   -Importance of sitting upright following meals  >>> Continue twice daily PPI    Office will contact patient this week to arrange subcu Reglan.  Also will contact patient to arrange appointment with the [facility] gastrointestinal motility  clinic.  Patient scheduled to have appointment with Magali Pandey on Wednesday, however, due to death in family, this will be rescheduled.    At this time, GI will sign off on patient as she is stable and ready for discharge.  Please call questions or concerns.    Terence White, HOWIE  02/13/22  13:09 EST

## 2022-02-14 ENCOUNTER — TRANSITIONAL CARE MANAGEMENT TELEPHONE ENCOUNTER (OUTPATIENT)
Dept: CALL CENTER | Facility: HOSPITAL | Age: 62
End: 2022-02-14

## 2022-02-14 NOTE — OUTREACH NOTE
Call Center TCM Note      Responses   [facility] patient discharged from? [place]   Does the patient have one of the following disease processes/diagnoses(primary or secondary)? Other   TCM attempt successful? Yes  [verbal release - SON]   Call start time 0838   Call end time 0843   Discharge diagnosis  nausea/vomiting/diarrhea   hypokalemia   Meds reviewed with patient/caregiver? Yes   Is the patient having any side effects they believe may be caused by any medication additions or changes? No   Does the patient have all medications ordered at discharge? Yes   Is the patient taking all medications as directed (includes completed medication regime)? Yes   Does the patient have a primary care provider?  Yes   Does the patient have an appointment with their PCP within 7 days of discharge? No   Comments regarding PCP No available HOSP DC FU appts to meet TCM guidelines. Please call Pt with an appt that meets guidelines. Thank you. ( will route to PCP office)    What is preventing the patient from scheduling follow up appointments within 7 days of discharge? Earlier appointment not available   Nursing Interventions Educated patient on importance of making appointment  [Route to PCP office]   Has the patient kept scheduled appointments due by today? N/A   Psychosocial issues? No   Did the patient receive a copy of their discharge instructions? Yes   Nursing interventions Reviewed instructions with patient   What is the patient's perception of their health status since discharge? Improving   Is the patient/caregiver able to teach back signs and symptoms related to disease process for when to call PCP? Yes   Is the patient/caregiver able to teach back signs and symptoms related to disease process for when to call 911? Yes   Is the patient/caregiver able to teach back the hierarchy of who to call/visit for symptoms/problems? PCP, Specialist, Home health nurse, Urgent Care, ED, 911 Yes   TCM call completed? Yes   Wrap  up additional comments Pt reports that she id doing well at this time and did get her meds.           Marisela Pablo RN    2/14/2022, 08:45 EST

## 2022-02-15 ENCOUNTER — TELEPHONE (OUTPATIENT)
Dept: GASTROENTEROLOGY | Facility: CLINIC | Age: 62
End: 2022-02-15

## 2022-02-15 DIAGNOSIS — K31.84 GASTROPARESIS: Primary | ICD-10-CM

## 2022-02-23 ENCOUNTER — OFFICE VISIT (OUTPATIENT)
Dept: GASTROENTEROLOGY | Facility: CLINIC | Age: 62
End: 2022-02-23

## 2022-02-23 ENCOUNTER — READMISSION MANAGEMENT (OUTPATIENT)
Dept: CALL CENTER | Facility: HOSPITAL | Age: 62
End: 2022-02-23

## 2022-02-23 VITALS
HEART RATE: 88 BPM | OXYGEN SATURATION: 97 % | DIASTOLIC BLOOD PRESSURE: 82 MMHG | WEIGHT: 140 LBS | BODY MASS INDEX: 21.22 KG/M2 | HEIGHT: 68 IN | SYSTOLIC BLOOD PRESSURE: 142 MMHG | TEMPERATURE: 97.3 F

## 2022-02-23 DIAGNOSIS — E11.43 GASTROPARESIS DUE TO DM: Primary | ICD-10-CM

## 2022-02-23 DIAGNOSIS — K21.9 GASTROESOPHAGEAL REFLUX DISEASE, UNSPECIFIED WHETHER ESOPHAGITIS PRESENT: ICD-10-CM

## 2022-02-23 DIAGNOSIS — K31.84 GASTROPARESIS DUE TO DM: Primary | ICD-10-CM

## 2022-02-23 PROCEDURE — 99214 OFFICE O/P EST MOD 30 MIN: CPT | Performed by: NURSE PRACTITIONER

## 2022-02-23 RX ORDER — BLOOD-GLUCOSE METER
KIT MISCELLANEOUS
Status: ON HOLD | COMMUNITY
Start: 2022-02-07 | End: 2022-12-12

## 2022-02-23 NOTE — OUTREACH NOTE
Medical Week 2 Survey      Responses   [facility] patient discharged from? [place]   Does the patient have one of the following disease processes/diagnoses(primary or secondary)? Other   Week 2 attempt successful? Yes   Call start time 0938   Discharge diagnosis  nausea/vomiting/diarrhea   hypokalemia   Call end time 0940   Is the patient taking all medications as directed (includes completed medication regime)? Yes   Does the patient have a primary care provider?  Yes   Has the patient kept scheduled appointments due by today? N/A   Comments Has appt today with PCP   Psychosocial issues? No   What is the patient's perception of their health status since discharge? Improving   Is the patient/caregiver able to teach back signs and symptoms related to disease process for when to call PCP? Yes   Is the patient/caregiver able to teach back signs and symptoms related to disease process for when to call 911? Yes   Is the patient/caregiver able to teach back the hierarchy of who to call/visit for symptoms/problems? PCP, Specialist, Home health nurse, Urgent Care, ED, 911 Yes   Additional teach back comments States she is doing well and has not been sick anymore.  Blood sugars this am were 139   Week 2 Call Completed? Yes   Graduated Yes   Did the patient feel the follow up calls were helpful during their recovery period? Yes   Was the number of calls appropriate? Yes   Graduated/Revoked comments Denies questions or needs at this time          Lolita Cassidy LPN

## 2022-02-23 NOTE — PROGRESS NOTES
Follow Up      Patient Name: Thelma Michael  : 1960   MRN: [id number]     Chief Complaint:    Chief Complaint   Patient presents with   • Follow-up     Gastroparesis, f/u hospital        History of Present Illness: Thelma Michael is a 61 y.o. female who is here today for follow up on gastroparesis.  Thelma was recently admitted last week to [facility] with intractable nausea and vomiting secondary to gastroparesis.  She was treated with IV Reglan and gingerroot tablets.  She tolerated p.o. and was discharged home.  She was counseled to avoid THC and was referred to [facility] motility clinic.  Since discharge she has been doing better. Has only had a few episodes of nausea since discharge that improved with zofran.  Tolerating PO-appetite improved.  Has gained weight..  Taking reglan 2 times a day- unsure of dose.  Taking ginger root with meals.  Denies chest pain or tremor.        Recently got insulin pump and also has dexcom sensor.  Blood sugar 139 this morning.    History of prolonged QT and thus not a candidate for erythromycin.  Subjective      Review of Systems:   Review of Systems   Constitutional: Negative for appetite change and unexpected weight loss.   HENT: Negative for trouble swallowing.    Gastrointestinal: Positive for nausea and GERD. Negative for abdominal distention, abdominal pain, anal bleeding, blood in stool, constipation, diarrhea, rectal pain, vomiting and indigestion.       Medications:     Current Outpatient Medications:   •  acetaminophen (TYLENOL) 325 MG tablet, Take 2 tablets by mouth Every 4 (Four) Hours As Needed for Mild Pain ., Disp: , Rfl:   •  albuterol sulfate  (90 Base) MCG/ACT inhaler, Inhale 2 puffs Every 4 (Four) Hours As Needed for Wheezing., Disp: 18 g, Rfl: 5  •  amLODIPine (NORVASC) 10 MG tablet, Take 1 tablet by mouth Daily., Disp: 90 tablet, Rfl: 1  •  Ascorbic Acid (VITAMIN C PO), Vitamin C TABS, Disp: , Rfl:   •  aspirin 81 MG EC  tablet, Take 1 tablet by mouth Daily., Disp: , Rfl:   •  atorvastatin (LIPITOR) 80 MG tablet, Take 1 tablet by mouth Every Night., Disp: 90 tablet, Rfl: 1  •  Blood Glucose Monitoring Suppl (FreeStyle Lite) w/Device kit, USE UNIT TO CHECK GLUCOSE 4 TIMES DAILY, Disp: , Rfl:   •  calcium carbonate (TUMS) 500 MG chewable tablet, Chew 1,000 mg 3 (Three) Times a Day As Needed for Indigestion or Heartburn., Disp: , Rfl:   •  Continuous Blood Gluc  (Dexcom G6 ) device, 1 kit Every 3 (Three) Months., Disp: 1 each, Rfl: 0  •  Continuous Blood Gluc Sensor (Dexcom G6 Sensor), Every 10 (Ten) Days., Disp: 9 each, Rfl: 3  •  Continuous Blood Gluc Transmit (Dexcom G6 Transmitter) misc, 1 kit Every 3 (Three) Months., Disp: 1 each, Rfl: 3  •  donepezil (Aricept) 5 MG tablet, Take 1 tablet by mouth Every Night., Disp: 30 tablet, Rfl: 1  •  doxazosin (CARDURA) 1 MG tablet, Take 1 tablet by mouth Every Night., Disp: 90 tablet, Rfl: 1  •  ferrous sulfate 325 (65 FE) MG tablet, Take 1 tablet by mouth Daily With Breakfast. Take with orange juice or vitamin C, Disp: 30 tablet, Rfl: 2  •  FLUoxetine (PROzac) 20 MG capsule, Take 1 capsule by mouth Daily., Disp: 90 capsule, Rfl: 1  •  gabapentin (NEURONTIN) 400 MG capsule, Take 1 capsule by mouth 2 (Two) Times a Day As Needed (leg and foot pain)., Disp: 60 capsule, Rfl: 1  •  glucose blood (Glucose Meter Test) test strip, Use as instructed to check blood glucose levels 3 times daily, Disp: 100 each, Rfl: 5  •  glucose blood test strip, Use as instructed, Disp: 200 each, Rfl: 12  •  glucose monitor monitoring kit, 1 each 4 (Four) Times a Day., Disp: 1 each, Rfl: 0  •  Insulin Glargine (BASAGLAR KWIKPEN) 100 UNIT/ML injection pen, Inject 20 Units under the skin into the appropriate area as directed Every Night., Disp: 15 mL, Rfl: 3  •  Insulin Lispro, 1 Unit Dial, (HumaLOG KwikPen) 100 UNIT/ML solution pen-injector, 15 units tid, Disp: 45 mL, Rfl: 3  •  Insulin Pen Needle (BD  Pen Needle Cydney U/F) 32G X 4 MM misc, Take 1 each by mouth 4 (Four) Times a Day., Disp: 100 each, Rfl: 5  •  megestrol (MEGACE) 40 MG tablet, Take 1 tablet by mouth Daily., Disp: 30 tablet, Rfl: 2  •  melatonin 5 MG tablet tablet, Take 1 tablet by mouth At Night As Needed (sleep)., Disp: , Rfl:   •  Multiple Vitamins-Minerals (Multivitamin-Minerals) tablet, Take 1 tablet by mouth Daily., Disp: , Rfl:   •  multivitamin (Multiple Vitamin) tablet tablet, Take 1 tablet by mouth Daily., Disp: 30 tablet, Rfl: 11  •  ondansetron (ZOFRAN) 4 MG tablet, Take 1 tablet by mouth Every 6 (Six) Hours As Needed for Nausea or Vomiting., Disp: 15 tablet, Rfl: 0  •  pantoprazole (PROTONIX) 40 MG EC tablet, Take 1 tablet by mouth Daily., Disp: 30 tablet, Rfl: 5  •  sennosides-docusate (senna-docusate sodium) 8.6-50 MG per tablet, Take 2 tablets by mouth 2 (Two) Times a Day As Needed for Constipation., Disp: 60 tablet, Rfl: 5  •  traMADol (ULTRAM) 50 MG tablet, Take 1 tablet by mouth Every 6 (Six) Hours As Needed for Moderate Pain ., Disp: 28 tablet, Rfl: 0  •  traZODone (DESYREL) 50 MG tablet, TAKE 1-2 TABLETS ONE HOUR BEFORE BEDTIME AS NEEDED FOR SLEEP., Disp: 45 tablet, Rfl: 0  •  vitamin D (ERGOCALCIFEROL) 1.25 MG (44392 UT) capsule capsule, Take 1 capsule by mouth once a week, Disp: 4 capsule, Rfl: 0  •  Wheat Dextrin (Benefiber Drink Mix) pack, Take 1 Scoop by mouth Daily., Disp: 30 each, Rfl: 5    Allergies:   No Known Allergies    Social History:   Social History     Socioeconomic History   • Marital status:    Tobacco Use   • Smoking status: Former Smoker     Quit date: 2019     Years since quittin.7   • Smokeless tobacco: Never Used   • Tobacco comment: BC PL never smoker    Vaping Use   • Vaping Use: Never used   Substance and Sexual Activity   • Alcohol use: Yes     Alcohol/week: 1.0 standard drink     Types: 1 Glasses of wine per week     Comment: ocassional   • Drug use: No   • Sexual activity: Defer      "Comment: Single         Surgical History:   Past Surgical History:   Procedure Laterality Date   • CAPSULE ENDOSCOPY  7/27/2021    Procedure: PILLCAM DEPLOYMENT;  Surgeon: Mikael Worthy MD;  Location:  [facility];  Service: Gastroenterology;;   • COLONOSCOPY     • COLONOSCOPY N/A 7/27/2021    Procedure: COLONOSCOPY;  Surgeon: Mikael Worthy MD;  Location:  [facility];  Service: Gastroenterology;  Laterality: N/A;   • DENTAL PROCEDURE     • ENDOSCOPY N/A 6/30/2021    Procedure: ESOPHAGOGASTRODUODENOSCOPY;  Surgeon: Brunner, Mark I, MD;  Location:  [facility];  Service: Gastroenterology;  Laterality: N/A;   • ENDOSCOPY N/A 7/27/2021    Procedure: ESOPHAGOGASTRODUODENOSCOPY;  Surgeon: Mikael Worthy MD;  Location:  [facility];  Service: Gastroenterology;  Laterality: N/A;   • NO PAST SURGERIES          Medical History:   Past Medical History:   Diagnosis Date   • Acid reflux    • Acute bronchitis    • Cardiac murmur    • Diabetes mellitus (HCC)    • H/O echocardiogram 08/07/2012    i. LVEF 65%.ii. Mild LVH.iii. Borderline evidence of atrial septal aneurysm.  No PFO.    • History of nuclear stress test 08/22/2014    Negative for ischemia and scars; LVEF 77%.     • Hyperlipidemia    • Hypertension    • Impacted cerumen of both ears    • Migraine    • Self-catheterizes urinary bladder    • Sinusitis    • Stroke (HCC)    • Tobacco abuse     quit 4 days ago.     • Urticaria         Objective     Physical Exam:  Vital Signs:   Vitals:    02/23/22 1141   BP: 142/82   BP Location: Left arm   Patient Position: Sitting   Cuff Size: Adult   Pulse: 88   Temp: 97.3 °F (36.3 °C)   TempSrc: Temporal   SpO2: 97%   Weight: 63.5 kg (140 lb)   Height: 172.7 cm (67.99\")     Body mass index is 21.29 kg/m².     Physical Exam  Vitals and nursing note reviewed.   Constitutional:       General: She is not in acute distress.     Appearance: She is well-developed. She is not diaphoretic.   Eyes:      " General: No scleral icterus.     Extraocular Movements:      Right eye: No nystagmus.      Left eye: No nystagmus.      Conjunctiva/sclera: Conjunctivae normal.      Pupils: Pupils are equal, round, and reactive to light.   Neck:      Thyroid: No thyromegaly.   Cardiovascular:      Rate and Rhythm: Normal rate and regular rhythm.   Pulmonary:      Effort: Pulmonary effort is normal.      Breath sounds: Normal breath sounds.   Abdominal:      General: Bowel sounds are normal. There is distension. There are no signs of injury.      Palpations: Abdomen is soft. There is no shifting dullness, hepatomegaly or splenomegaly.      Tenderness: There is no abdominal tenderness. There is no rebound.      Hernia: No hernia is present.   Musculoskeletal:      Cervical back: Neck supple.      Right lower leg: No edema.      Left lower leg: No edema.   Skin:     General: Skin is warm and dry.      Capillary Refill: Capillary refill takes 2 to 3 seconds.      Coloration: Skin is not jaundiced or pale.      Findings: No bruising or petechiae.      Nails: There is no clubbing.   Neurological:      Mental Status: She is alert and oriented to person, place, and time.   Psychiatric:         Behavior: Behavior normal.         Thought Content: Thought content normal.         Judgment: Judgment normal.         Assessment / Plan      Assessment/Plan:   Diagnoses and all orders for this visit:    1. Gastroparesis due to DM (HCC) (Primary)  She is awaiting call from you the motility clinic.  She has had dramatic improvement of her nausea and vomiting.  Tolerating p.o.  We will send in prescription for intranasal Reglan to specialty pharmacy for her to have on hand.  She can continue with oral for now.  She will notify me should she have intractable nausea and vomiting that has not improved with antiemetics if she has not established yet with GI motility clinic.  Counseled on avoidance of THC.  2. Gastroesophageal reflux disease, unspecified  whether esophagitis present  Continue pantoprazole         Follow Up:   Return if symptoms worsen or fail to improve.    Plan of care reviewed with the patient at the conclusion of today's visit.  Education was provided regarding diagnosis, management, and any prescribed or recommended OTC medications.  Patient verbalized understanding of and agreement with management plan.     .     HOWIE Samuels  [facility]

## 2022-02-25 ENCOUNTER — PRIOR AUTHORIZATION (OUTPATIENT)
Dept: GASTROENTEROLOGY | Facility: CLINIC | Age: 62
End: 2022-02-25

## 2022-02-25 ENCOUNTER — OFFICE VISIT (OUTPATIENT)
Dept: INTERNAL MEDICINE | Facility: CLINIC | Age: 62
End: 2022-02-25

## 2022-02-25 VITALS
TEMPERATURE: 98.1 F | RESPIRATION RATE: 16 BRPM | HEIGHT: 68 IN | OXYGEN SATURATION: 98 % | DIASTOLIC BLOOD PRESSURE: 96 MMHG | HEART RATE: 100 BPM | SYSTOLIC BLOOD PRESSURE: 198 MMHG | BODY MASS INDEX: 21.37 KG/M2 | WEIGHT: 141 LBS

## 2022-02-25 DIAGNOSIS — I10 ESSENTIAL HYPERTENSION: ICD-10-CM

## 2022-02-25 DIAGNOSIS — Z12.31 ENCOUNTER FOR SCREENING MAMMOGRAM FOR MALIGNANT NEOPLASM OF BREAST: ICD-10-CM

## 2022-02-25 DIAGNOSIS — E10.65 UNCONTROLLED TYPE 1 DIABETES MELLITUS WITH HYPERGLYCEMIA: ICD-10-CM

## 2022-02-25 DIAGNOSIS — Z12.4 CERVICAL CANCER SCREENING: ICD-10-CM

## 2022-02-25 DIAGNOSIS — K31.84 GASTROPARESIS: Primary | ICD-10-CM

## 2022-02-25 PROCEDURE — 99213 OFFICE O/P EST LOW 20 MIN: CPT | Performed by: NURSE PRACTITIONER

## 2022-02-25 RX ORDER — HYDROCHLOROTHIAZIDE 12.5 MG/1
12.5 TABLET ORAL DAILY
Qty: 30 TABLET | Refills: 1 | Status: SHIPPED | OUTPATIENT
Start: 2022-02-25 | End: 2022-03-29 | Stop reason: HOSPADM

## 2022-03-03 ENCOUNTER — TELEPHONE (OUTPATIENT)
Dept: INTERNAL MEDICINE | Facility: CLINIC | Age: 62
End: 2022-03-03

## 2022-03-03 DIAGNOSIS — M19.90 OSTEOARTHRITIS, UNSPECIFIED OSTEOARTHRITIS TYPE, UNSPECIFIED SITE: ICD-10-CM

## 2022-03-03 DIAGNOSIS — M25.50 GENERALIZED JOINT PAIN: ICD-10-CM

## 2022-03-03 RX ORDER — TRAMADOL HYDROCHLORIDE 50 MG/1
50 TABLET ORAL EVERY 6 HOURS PRN
Qty: 28 TABLET | Refills: 0 | Status: CANCELLED | OUTPATIENT
Start: 2022-03-03

## 2022-03-03 RX ORDER — TRAMADOL HYDROCHLORIDE 50 MG/1
50 TABLET ORAL EVERY 6 HOURS PRN
Qty: 28 TABLET | Refills: 2 | Status: SHIPPED | OUTPATIENT
Start: 2022-03-03 | End: 2022-04-06 | Stop reason: SDUPTHER

## 2022-03-03 NOTE — TELEPHONE ENCOUNTER
Caller: Thelma Michael    Relationship: Self    Best call back number: [phone]    Requested Prescriptions:   Requested Prescriptions     Pending Prescriptions Disp Refills   • traMADol (ULTRAM) 50 MG tablet 28 tablet 0     Sig: Take 1 tablet by mouth Every 6 (Six) Hours As Needed for Moderate Pain .        Pharmacy where request should be sent: [facility] [address] [place] [address] [phone] [facility] [phone] [facility]     Additional details provided by patient: PLEASE SEND REFILL    Does the patient have less than a 3 day supply:  [] Yes  [x] No    Nicol Self Rep   03/03/22 14:55 EST

## 2022-03-12 ENCOUNTER — HOSPITAL ENCOUNTER (INPATIENT)
Facility: HOSPITAL | Age: 62
LOS: 6 days | Discharge: HOME | End: 2022-03-19
Attending: STUDENT IN AN ORGANIZED HEALTH CARE EDUCATION/TRAINING PROGRAM | Admitting: INTERNAL MEDICINE

## 2022-03-12 ENCOUNTER — APPOINTMENT (OUTPATIENT)
Dept: CT IMAGING | Facility: HOSPITAL | Age: 62
End: 2022-03-12

## 2022-03-12 DIAGNOSIS — E10.10 TYPE 1 DIABETES MELLITUS WITH KETOACIDOSIS WITHOUT COMA: Primary | ICD-10-CM

## 2022-03-12 DIAGNOSIS — R11.2 NON-INTRACTABLE VOMITING WITH NAUSEA, UNSPECIFIED VOMITING TYPE: ICD-10-CM

## 2022-03-12 DIAGNOSIS — K31.84 GASTROPARESIS: ICD-10-CM

## 2022-03-12 DIAGNOSIS — E11.65 TYPE 2 DIABETES MELLITUS WITH HYPERGLYCEMIA, WITH LONG-TERM CURRENT USE OF INSULIN: ICD-10-CM

## 2022-03-12 DIAGNOSIS — Z79.4 TYPE 2 DIABETES MELLITUS WITH HYPERGLYCEMIA, WITH LONG-TERM CURRENT USE OF INSULIN: ICD-10-CM

## 2022-03-12 PROCEDURE — 63710000001 ONDANSETRON ODT 4 MG TABLET DISPERSIBLE: Performed by: PHYSICIAN ASSISTANT

## 2022-03-12 PROCEDURE — 93005 ELECTROCARDIOGRAM TRACING: CPT | Performed by: PHYSICIAN ASSISTANT

## 2022-03-12 PROCEDURE — 99285 EMERGENCY DEPT VISIT HI MDM: CPT

## 2022-03-12 RX ORDER — ONDANSETRON 2 MG/ML
4 INJECTION INTRAMUSCULAR; INTRAVENOUS ONCE
Status: DISCONTINUED | OUTPATIENT
Start: 2022-03-12 | End: 2022-03-12

## 2022-03-12 RX ORDER — PANTOPRAZOLE SODIUM 40 MG/10ML
80 INJECTION, POWDER, LYOPHILIZED, FOR SOLUTION INTRAVENOUS ONCE
Status: COMPLETED | OUTPATIENT
Start: 2022-03-12 | End: 2022-03-13

## 2022-03-12 RX ORDER — SODIUM CHLORIDE 9 MG/ML
10 INJECTION INTRAVENOUS AS NEEDED
Status: DISCONTINUED | OUTPATIENT
Start: 2022-03-12 | End: 2022-03-19 | Stop reason: HOSPADM

## 2022-03-12 RX ORDER — METOCLOPRAMIDE HYDROCHLORIDE 5 MG/ML
5 INJECTION INTRAMUSCULAR; INTRAVENOUS ONCE
Status: COMPLETED | OUTPATIENT
Start: 2022-03-12 | End: 2022-03-13

## 2022-03-12 RX ORDER — ONDANSETRON 4 MG/1
4 TABLET, ORALLY DISINTEGRATING ORAL ONCE
Status: COMPLETED | OUTPATIENT
Start: 2022-03-12 | End: 2022-03-12

## 2022-03-12 RX ADMIN — ONDANSETRON 4 MG: 4 TABLET, ORALLY DISINTEGRATING ORAL at 23:23

## 2022-03-13 ENCOUNTER — APPOINTMENT (OUTPATIENT)
Dept: GENERAL RADIOLOGY | Facility: HOSPITAL | Age: 62
End: 2022-03-13

## 2022-03-13 ENCOUNTER — APPOINTMENT (OUTPATIENT)
Dept: CT IMAGING | Facility: HOSPITAL | Age: 62
End: 2022-03-13

## 2022-03-13 PROBLEM — E10.10 TYPE 1 DIABETES MELLITUS WITH KETOACIDOSIS WITHOUT COMA: Status: ACTIVE | Noted: 2022-03-13

## 2022-03-13 PROBLEM — R82.71 BACTERIURIA: Status: ACTIVE | Noted: 2022-03-13

## 2022-03-13 LAB
ALBUMIN SERPL-MCNC: 4.2 G/DL (ref 3.5–5.2)
ALBUMIN SERPL-MCNC: 4.5 G/DL (ref 3.5–5.2)
ALBUMIN/GLOB SERPL: 1.4 G/DL
ALBUMIN/GLOB SERPL: 1.4 G/DL
ALP SERPL-CCNC: 84 U/L (ref 39–117)
ALP SERPL-CCNC: 91 U/L (ref 39–117)
ALT SERPL W P-5'-P-CCNC: 16 U/L (ref 1–33)
ALT SERPL W P-5'-P-CCNC: 17 U/L (ref 1–33)
AMMONIA BLD-SCNC: 10 UMOL/L (ref 11–51)
ANION GAP SERPL CALCULATED.3IONS-SCNC: 12 MMOL/L (ref 5–15)
ANION GAP SERPL CALCULATED.3IONS-SCNC: 14 MMOL/L (ref 5–15)
ANION GAP SERPL CALCULATED.3IONS-SCNC: 16 MMOL/L (ref 5–15)
ANION GAP SERPL CALCULATED.3IONS-SCNC: 19 MMOL/L (ref 5–15)
ANION GAP SERPL CALCULATED.3IONS-SCNC: 23 MMOL/L (ref 5–15)
ARTERIAL PATENCY WRIST A: ABNORMAL
AST SERPL-CCNC: 17 U/L (ref 1–32)
AST SERPL-CCNC: 30 U/L (ref 1–32)
ATMOSPHERIC PRESS: ABNORMAL MM[HG]
B PARAPERT DNA SPEC QL NAA+PROBE: NOT DETECTED
B PERT DNA SPEC QL NAA+PROBE: NOT DETECTED
B-OH-BUTYR SERPL-SCNC: 1.9 MMOL/L (ref 0.02–0.27)
B-OH-BUTYR SERPL-SCNC: 2.16 MMOL/L (ref 0.02–0.27)
BACTERIA UR QL AUTO: ABNORMAL /HPF
BASE EXCESS BLDA CALC-SCNC: -1.9 MMOL/L (ref 0–2)
BASOPHILS # BLD AUTO: 0.02 10*3/MM3 (ref 0–0.2)
BASOPHILS # BLD AUTO: 0.02 10*3/MM3 (ref 0–0.2)
BASOPHILS NFR BLD AUTO: 0.2 % (ref 0–1.5)
BASOPHILS NFR BLD AUTO: 0.2 % (ref 0–1.5)
BDY SITE: ABNORMAL
BILIRUB SERPL-MCNC: 0.6 MG/DL (ref 0–1.2)
BILIRUB SERPL-MCNC: 0.7 MG/DL (ref 0–1.2)
BILIRUB UR QL STRIP: NEGATIVE
BODY TEMPERATURE: 37 C
BUN SERPL-MCNC: 11 MG/DL (ref 8–23)
BUN SERPL-MCNC: 13 MG/DL (ref 8–23)
BUN SERPL-MCNC: 14 MG/DL (ref 8–23)
BUN SERPL-MCNC: 16 MG/DL (ref 8–23)
BUN SERPL-MCNC: 8 MG/DL (ref 8–23)
BUN/CREAT SERPL: 10.4 (ref 7–25)
BUN/CREAT SERPL: 14.1 (ref 7–25)
BUN/CREAT SERPL: 14.4 (ref 7–25)
BUN/CREAT SERPL: 15 (ref 7–25)
BUN/CREAT SERPL: 15.9 (ref 7–25)
C PNEUM DNA NPH QL NAA+NON-PROBE: NOT DETECTED
CALCIUM SPEC-SCNC: 10 MG/DL (ref 8.6–10.5)
CALCIUM SPEC-SCNC: 8.5 MG/DL (ref 8.6–10.5)
CALCIUM SPEC-SCNC: 8.6 MG/DL (ref 8.6–10.5)
CALCIUM SPEC-SCNC: 9 MG/DL (ref 8.6–10.5)
CALCIUM SPEC-SCNC: 9 MG/DL (ref 8.6–10.5)
CHLORIDE SERPL-SCNC: 100 MMOL/L (ref 98–107)
CHLORIDE SERPL-SCNC: 102 MMOL/L (ref 98–107)
CHLORIDE SERPL-SCNC: 105 MMOL/L (ref 98–107)
CHLORIDE SERPL-SCNC: 106 MMOL/L (ref 98–107)
CHLORIDE SERPL-SCNC: 109 MMOL/L (ref 98–107)
CLARITY UR: ABNORMAL
CO2 BLDA-SCNC: 21 MMOL/L (ref 22–33)
CO2 SERPL-SCNC: 18 MMOL/L (ref 22–29)
CO2 SERPL-SCNC: 19 MMOL/L (ref 22–29)
CO2 SERPL-SCNC: 21 MMOL/L (ref 22–29)
COHGB MFR BLD: 0.7 % (ref 0–2)
COLOR UR: YELLOW
CREAT BLDA-MCNC: 0.6 MG/DL (ref 0.6–1.3)
CREAT SERPL-MCNC: 0.77 MG/DL (ref 0.57–1)
CREAT SERPL-MCNC: 0.78 MG/DL (ref 0.57–1)
CREAT SERPL-MCNC: 0.88 MG/DL (ref 0.57–1)
CREAT SERPL-MCNC: 0.9 MG/DL (ref 0.57–1)
CREAT SERPL-MCNC: 1.07 MG/DL (ref 0.57–1)
D-LACTATE SERPL-SCNC: 3.3 MMOL/L (ref 0.5–2)
D-LACTATE SERPL-SCNC: 3.5 MMOL/L (ref 0.5–2)
DEPRECATED RDW RBC AUTO: 39.8 FL (ref 37–54)
DEPRECATED RDW RBC AUTO: 40.9 FL (ref 37–54)
EGFRCR SERPLBLD CKD-EPI 2021: 58.5 ML/MIN/1.73
EGFRCR SERPLBLD CKD-EPI 2021: 72 ML/MIN/1.73
EGFRCR SERPLBLD CKD-EPI 2021: 73.9 ML/MIN/1.73
EGFRCR SERPLBLD CKD-EPI 2021: 85.5 ML/MIN/1.73
EGFRCR SERPLBLD CKD-EPI 2021: 86.8 ML/MIN/1.73
EOSINOPHIL # BLD AUTO: 0 10*3/MM3 (ref 0–0.4)
EOSINOPHIL # BLD AUTO: 0.01 10*3/MM3 (ref 0–0.4)
EOSINOPHIL NFR BLD AUTO: 0 % (ref 0.3–6.2)
EOSINOPHIL NFR BLD AUTO: 0.1 % (ref 0.3–6.2)
EPAP: 0
ERYTHROCYTE [DISTWIDTH] IN BLOOD BY AUTOMATED COUNT: 12.5 % (ref 12.3–15.4)
ERYTHROCYTE [DISTWIDTH] IN BLOOD BY AUTOMATED COUNT: 12.6 % (ref 12.3–15.4)
FLUAV SUBTYP SPEC NAA+PROBE: NOT DETECTED
FLUBV RNA ISLT QL NAA+PROBE: NOT DETECTED
GLOBULIN UR ELPH-MCNC: 2.9 GM/DL
GLOBULIN UR ELPH-MCNC: 3.3 GM/DL
GLUCOSE BLDC GLUCOMTR-MCNC: 117 MG/DL (ref 70–130)
GLUCOSE BLDC GLUCOMTR-MCNC: 124 MG/DL (ref 70–130)
GLUCOSE BLDC GLUCOMTR-MCNC: 146 MG/DL (ref 70–130)
GLUCOSE BLDC GLUCOMTR-MCNC: 162 MG/DL (ref 70–130)
GLUCOSE BLDC GLUCOMTR-MCNC: 167 MG/DL (ref 70–130)
GLUCOSE BLDC GLUCOMTR-MCNC: 173 MG/DL (ref 70–130)
GLUCOSE BLDC GLUCOMTR-MCNC: 174 MG/DL (ref 70–130)
GLUCOSE BLDC GLUCOMTR-MCNC: 187 MG/DL (ref 70–130)
GLUCOSE BLDC GLUCOMTR-MCNC: 195 MG/DL (ref 70–130)
GLUCOSE BLDC GLUCOMTR-MCNC: 285 MG/DL (ref 70–130)
GLUCOSE BLDC GLUCOMTR-MCNC: 307 MG/DL (ref 70–130)
GLUCOSE BLDC GLUCOMTR-MCNC: 316 MG/DL (ref 70–130)
GLUCOSE BLDC GLUCOMTR-MCNC: 357 MG/DL (ref 70–130)
GLUCOSE BLDC GLUCOMTR-MCNC: 370 MG/DL (ref 70–130)
GLUCOSE BLDC GLUCOMTR-MCNC: 375 MG/DL (ref 70–130)
GLUCOSE BLDC GLUCOMTR-MCNC: 379 MG/DL (ref 70–130)
GLUCOSE BLDC GLUCOMTR-MCNC: 398 MG/DL (ref 70–130)
GLUCOSE SERPL-MCNC: 139 MG/DL (ref 65–99)
GLUCOSE SERPL-MCNC: 189 MG/DL (ref 65–99)
GLUCOSE SERPL-MCNC: 306 MG/DL (ref 65–99)
GLUCOSE SERPL-MCNC: 342 MG/DL (ref 65–99)
GLUCOSE SERPL-MCNC: 389 MG/DL (ref 65–99)
GLUCOSE UR STRIP-MCNC: ABNORMAL MG/DL
HADV DNA SPEC NAA+PROBE: NOT DETECTED
HBA1C MFR BLD: 8.4 % (ref 4.8–5.6)
HCO3 BLDA-SCNC: 20.2 MMOL/L (ref 20–26)
HCOV 229E RNA SPEC QL NAA+PROBE: NOT DETECTED
HCOV HKU1 RNA SPEC QL NAA+PROBE: NOT DETECTED
HCOV NL63 RNA SPEC QL NAA+PROBE: NOT DETECTED
HCOV OC43 RNA SPEC QL NAA+PROBE: NOT DETECTED
HCT VFR BLD AUTO: 34.2 % (ref 34–46.6)
HCT VFR BLD AUTO: 38.4 % (ref 34–46.6)
HCT VFR BLD CALC: 37.5 % (ref 38–51)
HGB BLD-MCNC: 11.7 G/DL (ref 12–15.9)
HGB BLD-MCNC: 13.3 G/DL (ref 12–15.9)
HGB BLDA-MCNC: 12.2 G/DL (ref 14–18)
HGB UR QL STRIP.AUTO: ABNORMAL
HMPV RNA NPH QL NAA+NON-PROBE: NOT DETECTED
HOLD SPECIMEN: NORMAL
HPIV1 RNA ISLT QL NAA+PROBE: NOT DETECTED
HPIV2 RNA SPEC QL NAA+PROBE: NOT DETECTED
HPIV3 RNA NPH QL NAA+PROBE: NOT DETECTED
HPIV4 P GENE NPH QL NAA+PROBE: NOT DETECTED
HYALINE CASTS UR QL AUTO: ABNORMAL /LPF
IMM GRANULOCYTES # BLD AUTO: 0.04 10*3/MM3 (ref 0–0.05)
IMM GRANULOCYTES # BLD AUTO: 0.04 10*3/MM3 (ref 0–0.05)
IMM GRANULOCYTES NFR BLD AUTO: 0.3 % (ref 0–0.5)
IMM GRANULOCYTES NFR BLD AUTO: 0.5 % (ref 0–0.5)
INHALED O2 CONCENTRATION: 21 %
IPAP: 0
KETONES UR QL STRIP: ABNORMAL
LEUKOCYTE ESTERASE UR QL STRIP.AUTO: NEGATIVE
LIPASE SERPL-CCNC: 25 U/L (ref 13–60)
LYMPHOCYTES # BLD AUTO: 0.49 10*3/MM3 (ref 0.7–3.1)
LYMPHOCYTES # BLD AUTO: 1 10*3/MM3 (ref 0.7–3.1)
LYMPHOCYTES NFR BLD AUTO: 11.6 % (ref 19.6–45.3)
LYMPHOCYTES NFR BLD AUTO: 4 % (ref 19.6–45.3)
M PNEUMO IGG SER IA-ACNC: NOT DETECTED
MAGNESIUM SERPL-MCNC: 1.5 MG/DL (ref 1.6–2.4)
MAGNESIUM SERPL-MCNC: 1.6 MG/DL (ref 1.6–2.4)
MAGNESIUM SERPL-MCNC: 1.6 MG/DL (ref 1.6–2.4)
MAGNESIUM SERPL-MCNC: 1.8 MG/DL (ref 1.6–2.4)
MCH RBC QN AUTO: 30 PG (ref 26.6–33)
MCH RBC QN AUTO: 30.2 PG (ref 26.6–33)
MCHC RBC AUTO-ENTMCNC: 34.2 G/DL (ref 31.5–35.7)
MCHC RBC AUTO-ENTMCNC: 34.6 G/DL (ref 31.5–35.7)
MCV RBC AUTO: 86.7 FL (ref 79–97)
MCV RBC AUTO: 88.4 FL (ref 79–97)
METHGB BLD QL: 0.5 % (ref 0–1.5)
MODALITY: ABNORMAL
MONOCYTES # BLD AUTO: 0.12 10*3/MM3 (ref 0.1–0.9)
MONOCYTES # BLD AUTO: 0.23 10*3/MM3 (ref 0.1–0.9)
MONOCYTES NFR BLD AUTO: 1 % (ref 5–12)
MONOCYTES NFR BLD AUTO: 2.7 % (ref 5–12)
NEUTROPHILS NFR BLD AUTO: 11.64 10*3/MM3 (ref 1.7–7)
NEUTROPHILS NFR BLD AUTO: 7.35 10*3/MM3 (ref 1.7–7)
NEUTROPHILS NFR BLD AUTO: 84.9 % (ref 42.7–76)
NEUTROPHILS NFR BLD AUTO: 94.5 % (ref 42.7–76)
NITRITE UR QL STRIP: NEGATIVE
NOTE: ABNORMAL
NRBC BLD AUTO-RTO: 0 /100 WBC (ref 0–0.2)
NRBC BLD AUTO-RTO: 0 /100 WBC (ref 0–0.2)
OSMOLALITY SERPL: 307 MOSM/KG (ref 275–295)
OSMOLALITY SERPL: 307 MOSM/KG (ref 275–295)
OXYHGB MFR BLDV: 97.5 % (ref 94–99)
PAW @ PEAK INSP FLOW SETTING VENT: 0 CMH2O
PCO2 BLDA: 26.6 MM HG (ref 35–45)
PCO2 TEMP ADJ BLD: 26.6 MM HG (ref 35–45)
PH BLDA: 7.49 PH UNITS (ref 7.35–7.45)
PH UR STRIP.AUTO: 7 [PH] (ref 5–8)
PH, TEMP CORRECTED: 7.49 PH UNITS
PHOSPHATE SERPL-MCNC: 1.9 MG/DL (ref 2.5–4.5)
PHOSPHATE SERPL-MCNC: 2.5 MG/DL (ref 2.5–4.5)
PHOSPHATE SERPL-MCNC: 3.8 MG/DL (ref 2.5–4.5)
PLATELET # BLD AUTO: 433 10*3/MM3 (ref 140–450)
PLATELET # BLD AUTO: 519 10*3/MM3 (ref 140–450)
PMV BLD AUTO: 9.3 FL (ref 6–12)
PMV BLD AUTO: 9.4 FL (ref 6–12)
PO2 BLDA: 99.9 MM HG (ref 83–108)
PO2 TEMP ADJ BLD: 99.9 MM HG (ref 83–108)
POTASSIUM SERPL-SCNC: 2.9 MMOL/L (ref 3.5–5.2)
POTASSIUM SERPL-SCNC: 3.3 MMOL/L (ref 3.5–5.2)
POTASSIUM SERPL-SCNC: 3.3 MMOL/L (ref 3.5–5.2)
POTASSIUM SERPL-SCNC: 3.5 MMOL/L (ref 3.5–5.2)
POTASSIUM SERPL-SCNC: 3.6 MMOL/L (ref 3.5–5.2)
PROCALCITONIN SERPL-MCNC: 0.07 NG/ML (ref 0–0.25)
PROT SERPL-MCNC: 7.1 G/DL (ref 6–8.5)
PROT SERPL-MCNC: 7.8 G/DL (ref 6–8.5)
PROT UR QL STRIP: ABNORMAL
QT INTERVAL: 378 MS
QT INTERVAL: 422 MS
QTC INTERVAL: 485 MS
QTC INTERVAL: 544 MS
RBC # BLD AUTO: 3.87 10*6/MM3 (ref 3.77–5.28)
RBC # BLD AUTO: 4.43 10*6/MM3 (ref 3.77–5.28)
RBC # UR STRIP: ABNORMAL /HPF
REF LAB TEST METHOD: ABNORMAL
RHINOVIRUS RNA SPEC NAA+PROBE: NOT DETECTED
RSV RNA NPH QL NAA+NON-PROBE: NOT DETECTED
SARS-COV-2 RNA NPH QL NAA+NON-PROBE: NOT DETECTED
SODIUM SERPL-SCNC: 139 MMOL/L (ref 136–145)
SODIUM SERPL-SCNC: 140 MMOL/L (ref 136–145)
SODIUM SERPL-SCNC: 140 MMOL/L (ref 136–145)
SODIUM SERPL-SCNC: 141 MMOL/L (ref 136–145)
SODIUM SERPL-SCNC: 142 MMOL/L (ref 136–145)
SP GR UR STRIP: 1.02 (ref 1–1.03)
SQUAMOUS #/AREA URNS HPF: ABNORMAL /HPF
TOTAL RATE: 0 BREATHS/MINUTE
TROPONIN T SERPL-MCNC: 0.01 NG/ML (ref 0–0.03)
UROBILINOGEN UR QL STRIP: ABNORMAL
WBC # UR STRIP: ABNORMAL /HPF
WBC NRBC COR # BLD: 12.31 10*3/MM3 (ref 3.4–10.8)
WBC NRBC COR # BLD: 8.65 10*3/MM3 (ref 3.4–10.8)
WHOLE BLOOD HOLD SPECIMEN: NORMAL
WHOLE BLOOD HOLD SPECIMEN: NORMAL

## 2022-03-13 PROCEDURE — 83930 ASSAY OF BLOOD OSMOLALITY: CPT | Performed by: PHYSICIAN ASSISTANT

## 2022-03-13 PROCEDURE — 83050 HGB METHEMOGLOBIN QUAN: CPT

## 2022-03-13 PROCEDURE — 74174 CTA ABD&PLVS W/CONTRAST: CPT

## 2022-03-13 PROCEDURE — 82140 ASSAY OF AMMONIA: CPT | Performed by: PHYSICIAN ASSISTANT

## 2022-03-13 PROCEDURE — 25010000002 CEFTRIAXONE PER 250 MG: Performed by: STUDENT IN AN ORGANIZED HEALTH CARE EDUCATION/TRAINING PROGRAM

## 2022-03-13 PROCEDURE — 83930 ASSAY OF BLOOD OSMOLALITY: CPT | Performed by: NURSE PRACTITIONER

## 2022-03-13 PROCEDURE — 84145 PROCALCITONIN (PCT): CPT | Performed by: PHYSICIAN ASSISTANT

## 2022-03-13 PROCEDURE — 82565 ASSAY OF CREATININE: CPT

## 2022-03-13 PROCEDURE — 83735 ASSAY OF MAGNESIUM: CPT | Performed by: NURSE PRACTITIONER

## 2022-03-13 PROCEDURE — 93005 ELECTROCARDIOGRAM TRACING: CPT | Performed by: PHYSICIAN ASSISTANT

## 2022-03-13 PROCEDURE — 82010 KETONE BODYS QUAN: CPT | Performed by: NURSE PRACTITIONER

## 2022-03-13 PROCEDURE — 82962 GLUCOSE BLOOD TEST: CPT

## 2022-03-13 PROCEDURE — 0 POTASSIUM CHLORIDE 10 MEQ/100ML SOLUTION: Performed by: INTERNAL MEDICINE

## 2022-03-13 PROCEDURE — 84100 ASSAY OF PHOSPHORUS: CPT | Performed by: NURSE PRACTITIONER

## 2022-03-13 PROCEDURE — 83690 ASSAY OF LIPASE: CPT | Performed by: STUDENT IN AN ORGANIZED HEALTH CARE EDUCATION/TRAINING PROGRAM

## 2022-03-13 PROCEDURE — 0202U NFCT DS 22 TRGT SARS-COV-2: CPT | Performed by: INTERNAL MEDICINE

## 2022-03-13 PROCEDURE — 87040 BLOOD CULTURE FOR BACTERIA: CPT | Performed by: STUDENT IN AN ORGANIZED HEALTH CARE EDUCATION/TRAINING PROGRAM

## 2022-03-13 PROCEDURE — 84100 ASSAY OF PHOSPHORUS: CPT | Performed by: PHYSICIAN ASSISTANT

## 2022-03-13 PROCEDURE — 83735 ASSAY OF MAGNESIUM: CPT | Performed by: PHYSICIAN ASSISTANT

## 2022-03-13 PROCEDURE — 25010000002 LORAZEPAM PER 2 MG: Performed by: INTERNAL MEDICINE

## 2022-03-13 PROCEDURE — 85025 COMPLETE CBC W/AUTO DIFF WBC: CPT | Performed by: PHYSICIAN ASSISTANT

## 2022-03-13 PROCEDURE — 0 POTASSIUM CHLORIDE PER 2 MEQ: Performed by: PHYSICIAN ASSISTANT

## 2022-03-13 PROCEDURE — 81001 URINALYSIS AUTO W/SCOPE: CPT | Performed by: STUDENT IN AN ORGANIZED HEALTH CARE EDUCATION/TRAINING PROGRAM

## 2022-03-13 PROCEDURE — 83036 HEMOGLOBIN GLYCOSYLATED A1C: CPT | Performed by: PHYSICIAN ASSISTANT

## 2022-03-13 PROCEDURE — 25010000002 METOCLOPRAMIDE PER 10 MG: Performed by: STUDENT IN AN ORGANIZED HEALTH CARE EDUCATION/TRAINING PROGRAM

## 2022-03-13 PROCEDURE — 83605 ASSAY OF LACTIC ACID: CPT | Performed by: STUDENT IN AN ORGANIZED HEALTH CARE EDUCATION/TRAINING PROGRAM

## 2022-03-13 PROCEDURE — 82805 BLOOD GASES W/O2 SATURATION: CPT

## 2022-03-13 PROCEDURE — 82010 KETONE BODYS QUAN: CPT | Performed by: PHYSICIAN ASSISTANT

## 2022-03-13 PROCEDURE — 36600 WITHDRAWAL OF ARTERIAL BLOOD: CPT

## 2022-03-13 PROCEDURE — 80053 COMPREHEN METABOLIC PANEL: CPT | Performed by: STUDENT IN AN ORGANIZED HEALTH CARE EDUCATION/TRAINING PROGRAM

## 2022-03-13 PROCEDURE — 0 IOPAMIDOL PER 1 ML: Performed by: STUDENT IN AN ORGANIZED HEALTH CARE EDUCATION/TRAINING PROGRAM

## 2022-03-13 PROCEDURE — 87086 URINE CULTURE/COLONY COUNT: CPT | Performed by: NURSE PRACTITIONER

## 2022-03-13 PROCEDURE — 99223 1ST HOSP IP/OBS HIGH 75: CPT | Performed by: INTERNAL MEDICINE

## 2022-03-13 PROCEDURE — 82375 ASSAY CARBOXYHB QUANT: CPT

## 2022-03-13 PROCEDURE — 25010000002 HYDROMORPHONE PER 4 MG: Performed by: STUDENT IN AN ORGANIZED HEALTH CARE EDUCATION/TRAINING PROGRAM

## 2022-03-13 PROCEDURE — 84484 ASSAY OF TROPONIN QUANT: CPT | Performed by: PHYSICIAN ASSISTANT

## 2022-03-13 PROCEDURE — 85025 COMPLETE CBC W/AUTO DIFF WBC: CPT | Performed by: STUDENT IN AN ORGANIZED HEALTH CARE EDUCATION/TRAINING PROGRAM

## 2022-03-13 PROCEDURE — 71045 X-RAY EXAM CHEST 1 VIEW: CPT

## 2022-03-13 PROCEDURE — 25010000002 ONDANSETRON PER 1 MG: Performed by: PHYSICIAN ASSISTANT

## 2022-03-13 RX ORDER — DONEPEZIL HYDROCHLORIDE 5 MG/1
5 TABLET, FILM COATED ORAL NIGHTLY
Status: DISCONTINUED | OUTPATIENT
Start: 2022-03-13 | End: 2022-03-16

## 2022-03-13 RX ORDER — FERROUS SULFATE 325(65) MG
325 TABLET ORAL
Status: DISCONTINUED | OUTPATIENT
Start: 2022-03-13 | End: 2022-03-15

## 2022-03-13 RX ORDER — SODIUM CHLORIDE 450 MG/100ML
250 INJECTION, SOLUTION INTRAVENOUS CONTINUOUS PRN
Status: DISCONTINUED | OUTPATIENT
Start: 2022-03-13 | End: 2022-03-13 | Stop reason: SDUPTHER

## 2022-03-13 RX ORDER — DEXTROSE, SODIUM CHLORIDE, AND POTASSIUM CHLORIDE 5; .9; .15 G/100ML; G/100ML; G/100ML
150 INJECTION INTRAVENOUS CONTINUOUS PRN
Status: DISCONTINUED | OUTPATIENT
Start: 2022-03-13 | End: 2022-03-13 | Stop reason: SDUPTHER

## 2022-03-13 RX ORDER — SODIUM CHLORIDE 0.9 % (FLUSH) 0.9 %
10 SYRINGE (ML) INJECTION AS NEEDED
Status: DISCONTINUED | OUTPATIENT
Start: 2022-03-13 | End: 2022-03-13 | Stop reason: SDUPTHER

## 2022-03-13 RX ORDER — SODIUM CHLORIDE AND POTASSIUM CHLORIDE 150; 900 MG/100ML; MG/100ML
250 INJECTION, SOLUTION INTRAVENOUS CONTINUOUS PRN
Status: DISCONTINUED | OUTPATIENT
Start: 2022-03-13 | End: 2022-03-13

## 2022-03-13 RX ORDER — SODIUM CHLORIDE AND POTASSIUM CHLORIDE 150; 900 MG/100ML; MG/100ML
250 INJECTION, SOLUTION INTRAVENOUS CONTINUOUS PRN
Status: DISCONTINUED | OUTPATIENT
Start: 2022-03-13 | End: 2022-03-13 | Stop reason: SDUPTHER

## 2022-03-13 RX ORDER — MELATONIN
1000 DAILY
Status: DISCONTINUED | OUTPATIENT
Start: 2022-03-13 | End: 2022-03-19 | Stop reason: HOSPADM

## 2022-03-13 RX ORDER — CHOLECALCIFEROL (VITAMIN D3) 125 MCG
5 CAPSULE ORAL NIGHTLY PRN
Status: DISCONTINUED | OUTPATIENT
Start: 2022-03-13 | End: 2022-03-16

## 2022-03-13 RX ORDER — POTASSIUM CHLORIDE 750 MG/1
40 CAPSULE, EXTENDED RELEASE ORAL AS NEEDED
Status: DISCONTINUED | OUTPATIENT
Start: 2022-03-13 | End: 2022-03-16

## 2022-03-13 RX ORDER — SODIUM CHLORIDE AND POTASSIUM CHLORIDE 300; 900 MG/100ML; MG/100ML
250 INJECTION, SOLUTION INTRAVENOUS CONTINUOUS PRN
Status: DISCONTINUED | OUTPATIENT
Start: 2022-03-13 | End: 2022-03-13

## 2022-03-13 RX ORDER — SODIUM CHLORIDE AND POTASSIUM CHLORIDE 150; 450 MG/100ML; MG/100ML
250 INJECTION, SOLUTION INTRAVENOUS CONTINUOUS PRN
Status: DISCONTINUED | OUTPATIENT
Start: 2022-03-13 | End: 2022-03-13 | Stop reason: SDUPTHER

## 2022-03-13 RX ORDER — MAGNESIUM SULFATE HEPTAHYDRATE 40 MG/ML
4 INJECTION, SOLUTION INTRAVENOUS AS NEEDED
Status: DISCONTINUED | OUTPATIENT
Start: 2022-03-13 | End: 2022-03-19 | Stop reason: HOSPADM

## 2022-03-13 RX ORDER — SODIUM CHLORIDE 9 MG/ML
10 INJECTION, SOLUTION INTRAVENOUS CONTINUOUS PRN
Status: DISCONTINUED | OUTPATIENT
Start: 2022-03-13 | End: 2022-03-13 | Stop reason: SDUPTHER

## 2022-03-13 RX ORDER — MULTIPLE VITAMINS W/ MINERALS TAB 9MG-400MCG
1 TAB ORAL DAILY
Status: DISCONTINUED | OUTPATIENT
Start: 2022-03-13 | End: 2022-03-19 | Stop reason: HOSPADM

## 2022-03-13 RX ORDER — SODIUM CHLORIDE 0.9 % (FLUSH) 0.9 %
10 SYRINGE (ML) INJECTION EVERY 12 HOURS SCHEDULED
Status: DISCONTINUED | OUTPATIENT
Start: 2022-03-13 | End: 2022-03-19 | Stop reason: HOSPADM

## 2022-03-13 RX ORDER — ACETAMINOPHEN 650 MG/1
650 SUPPOSITORY RECTAL EVERY 4 HOURS PRN
Status: DISCONTINUED | OUTPATIENT
Start: 2022-03-13 | End: 2022-03-16

## 2022-03-13 RX ORDER — SODIUM CHLORIDE 9 MG/ML
10 INJECTION, SOLUTION INTRAVENOUS CONTINUOUS PRN
Status: DISCONTINUED | OUTPATIENT
Start: 2022-03-13 | End: 2022-03-19 | Stop reason: HOSPADM

## 2022-03-13 RX ORDER — SODIUM CHLORIDE 0.9 % (FLUSH) 0.9 %
10 SYRINGE (ML) INJECTION ONCE AS NEEDED
Status: DISCONTINUED | OUTPATIENT
Start: 2022-03-13 | End: 2022-03-13

## 2022-03-13 RX ORDER — GABAPENTIN 400 MG/1
400 CAPSULE ORAL 2 TIMES DAILY PRN
Status: DISCONTINUED | OUTPATIENT
Start: 2022-03-13 | End: 2022-03-16

## 2022-03-13 RX ORDER — ACETAMINOPHEN 160 MG/5ML
650 SOLUTION ORAL EVERY 4 HOURS PRN
Status: DISCONTINUED | OUTPATIENT
Start: 2022-03-13 | End: 2022-03-16

## 2022-03-13 RX ORDER — DEXTROSE, SODIUM CHLORIDE, AND POTASSIUM CHLORIDE 5; .9; .15 G/100ML; G/100ML; G/100ML
150 INJECTION INTRAVENOUS CONTINUOUS PRN
Status: DISCONTINUED | OUTPATIENT
Start: 2022-03-13 | End: 2022-03-19 | Stop reason: HOSPADM

## 2022-03-13 RX ORDER — POTASSIUM CHLORIDE, DEXTROSE MONOHYDRATE AND SODIUM CHLORIDE 300; 5; 900 MG/100ML; G/100ML; MG/100ML
150 INJECTION, SOLUTION INTRAVENOUS CONTINUOUS PRN
Status: DISCONTINUED | OUTPATIENT
Start: 2022-03-13 | End: 2022-03-19 | Stop reason: HOSPADM

## 2022-03-13 RX ORDER — DEXTROSE, SODIUM CHLORIDE, AND POTASSIUM CHLORIDE 5; .45; .3 G/100ML; G/100ML; G/100ML
150 INJECTION INTRAVENOUS CONTINUOUS PRN
Status: DISCONTINUED | OUTPATIENT
Start: 2022-03-13 | End: 2022-03-19 | Stop reason: HOSPADM

## 2022-03-13 RX ORDER — DEXTROSE AND SODIUM CHLORIDE 5; .9 G/100ML; G/100ML
150 INJECTION, SOLUTION INTRAVENOUS CONTINUOUS PRN
Status: DISCONTINUED | OUTPATIENT
Start: 2022-03-13 | End: 2022-03-19 | Stop reason: HOSPADM

## 2022-03-13 RX ORDER — TERAZOSIN 1 MG/1
1 CAPSULE ORAL NIGHTLY
Status: DISCONTINUED | OUTPATIENT
Start: 2022-03-13 | End: 2022-03-19 | Stop reason: HOSPADM

## 2022-03-13 RX ORDER — DEXTROSE MONOHYDRATE 25 G/50ML
12.5 INJECTION, SOLUTION INTRAVENOUS AS NEEDED
Status: DISCONTINUED | OUTPATIENT
Start: 2022-03-13 | End: 2022-03-19 | Stop reason: HOSPADM

## 2022-03-13 RX ORDER — DEXTROSE AND SODIUM CHLORIDE 5; .9 G/100ML; G/100ML
150 INJECTION, SOLUTION INTRAVENOUS CONTINUOUS PRN
Status: DISCONTINUED | OUTPATIENT
Start: 2022-03-13 | End: 2022-03-13 | Stop reason: SDUPTHER

## 2022-03-13 RX ORDER — POTASSIUM CHLORIDE 7.45 MG/ML
10 INJECTION INTRAVENOUS
Status: DISCONTINUED | OUTPATIENT
Start: 2022-03-13 | End: 2022-03-16

## 2022-03-13 RX ORDER — SODIUM CHLORIDE 0.9 % (FLUSH) 0.9 %
10 SYRINGE (ML) INJECTION ONCE AS NEEDED
Status: DISCONTINUED | OUTPATIENT
Start: 2022-03-13 | End: 2022-03-13 | Stop reason: SDUPTHER

## 2022-03-13 RX ORDER — DEXTROSE, SODIUM CHLORIDE, AND POTASSIUM CHLORIDE 5; .45; .15 G/100ML; G/100ML; G/100ML
150 INJECTION INTRAVENOUS CONTINUOUS PRN
Status: DISCONTINUED | OUTPATIENT
Start: 2022-03-13 | End: 2022-03-13 | Stop reason: SDUPTHER

## 2022-03-13 RX ORDER — DEXTROSE, SODIUM CHLORIDE, AND POTASSIUM CHLORIDE 5; .45; .15 G/100ML; G/100ML; G/100ML
150 INJECTION INTRAVENOUS CONTINUOUS PRN
Status: DISCONTINUED | OUTPATIENT
Start: 2022-03-13 | End: 2022-03-19 | Stop reason: HOSPADM

## 2022-03-13 RX ORDER — ONDANSETRON 2 MG/ML
4 INJECTION INTRAMUSCULAR; INTRAVENOUS ONCE
Status: COMPLETED | OUTPATIENT
Start: 2022-03-13 | End: 2022-03-13

## 2022-03-13 RX ORDER — LABETALOL HYDROCHLORIDE 5 MG/ML
10 INJECTION, SOLUTION INTRAVENOUS ONCE
Status: COMPLETED | OUTPATIENT
Start: 2022-03-13 | End: 2022-03-13

## 2022-03-13 RX ORDER — SODIUM CHLORIDE 9 MG/ML
250 INJECTION, SOLUTION INTRAVENOUS CONTINUOUS PRN
Status: DISCONTINUED | OUTPATIENT
Start: 2022-03-13 | End: 2022-03-13

## 2022-03-13 RX ORDER — AMLODIPINE BESYLATE 10 MG/1
10 TABLET ORAL DAILY
Status: DISCONTINUED | OUTPATIENT
Start: 2022-03-13 | End: 2022-03-16

## 2022-03-13 RX ORDER — TRAMADOL HYDROCHLORIDE 50 MG/1
50 TABLET ORAL EVERY 6 HOURS PRN
Status: DISCONTINUED | OUTPATIENT
Start: 2022-03-13 | End: 2022-03-16

## 2022-03-13 RX ORDER — ATORVASTATIN CALCIUM 40 MG/1
80 TABLET, FILM COATED ORAL NIGHTLY
Status: DISCONTINUED | OUTPATIENT
Start: 2022-03-13 | End: 2022-03-16

## 2022-03-13 RX ORDER — DEXTROSE AND SODIUM CHLORIDE 5; .45 G/100ML; G/100ML
150 INJECTION, SOLUTION INTRAVENOUS CONTINUOUS PRN
Status: DISCONTINUED | OUTPATIENT
Start: 2022-03-13 | End: 2022-03-13 | Stop reason: SDUPTHER

## 2022-03-13 RX ORDER — HYDROMORPHONE HYDROCHLORIDE 1 MG/ML
0.25 INJECTION, SOLUTION INTRAMUSCULAR; INTRAVENOUS; SUBCUTANEOUS ONCE
Status: COMPLETED | OUTPATIENT
Start: 2022-03-13 | End: 2022-03-13

## 2022-03-13 RX ORDER — ALBUTEROL SULFATE 2.5 MG/3ML
2.5 SOLUTION RESPIRATORY (INHALATION) EVERY 4 HOURS PRN
Status: DISCONTINUED | OUTPATIENT
Start: 2022-03-13 | End: 2022-03-19 | Stop reason: HOSPADM

## 2022-03-13 RX ORDER — DEXTROSE MONOHYDRATE 25 G/50ML
12.5 INJECTION, SOLUTION INTRAVENOUS AS NEEDED
Status: DISCONTINUED | OUTPATIENT
Start: 2022-03-13 | End: 2022-03-13 | Stop reason: SDUPTHER

## 2022-03-13 RX ORDER — HYDRALAZINE HYDROCHLORIDE 20 MG/ML
10 INJECTION INTRAMUSCULAR; INTRAVENOUS EVERY 6 HOURS PRN
Status: DISCONTINUED | OUTPATIENT
Start: 2022-03-13 | End: 2022-03-19 | Stop reason: HOSPADM

## 2022-03-13 RX ORDER — DEXTROSE AND SODIUM CHLORIDE 5; .45 G/100ML; G/100ML
150 INJECTION, SOLUTION INTRAVENOUS CONTINUOUS PRN
Status: DISCONTINUED | OUTPATIENT
Start: 2022-03-13 | End: 2022-03-19 | Stop reason: HOSPADM

## 2022-03-13 RX ORDER — SODIUM CHLORIDE AND POTASSIUM CHLORIDE 300; 900 MG/100ML; MG/100ML
250 INJECTION, SOLUTION INTRAVENOUS CONTINUOUS PRN
Status: DISCONTINUED | OUTPATIENT
Start: 2022-03-13 | End: 2022-03-13 | Stop reason: SDUPTHER

## 2022-03-13 RX ORDER — LABETALOL HYDROCHLORIDE 5 MG/ML
20 INJECTION, SOLUTION INTRAVENOUS ONCE
Status: COMPLETED | OUTPATIENT
Start: 2022-03-13 | End: 2022-03-13

## 2022-03-13 RX ORDER — FLUOXETINE HYDROCHLORIDE 20 MG/1
20 CAPSULE ORAL DAILY
Status: DISCONTINUED | OUTPATIENT
Start: 2022-03-13 | End: 2022-03-16

## 2022-03-13 RX ORDER — MAGNESIUM SULFATE HEPTAHYDRATE 40 MG/ML
2 INJECTION, SOLUTION INTRAVENOUS AS NEEDED
Status: DISCONTINUED | OUTPATIENT
Start: 2022-03-13 | End: 2022-03-19 | Stop reason: HOSPADM

## 2022-03-13 RX ORDER — ASCORBIC ACID 500 MG
500 TABLET ORAL DAILY
Status: DISCONTINUED | OUTPATIENT
Start: 2022-03-13 | End: 2022-03-16

## 2022-03-13 RX ORDER — POTASSIUM CHLORIDE 1.5 G/1.77G
40 POWDER, FOR SOLUTION ORAL AS NEEDED
Status: DISCONTINUED | OUTPATIENT
Start: 2022-03-13 | End: 2022-03-16

## 2022-03-13 RX ORDER — SODIUM CHLORIDE 0.9 % (FLUSH) 0.9 %
10 SYRINGE (ML) INJECTION AS NEEDED
Status: DISCONTINUED | OUTPATIENT
Start: 2022-03-13 | End: 2022-03-13

## 2022-03-13 RX ORDER — POTASSIUM CHLORIDE, DEXTROSE MONOHYDRATE AND SODIUM CHLORIDE 300; 5; 900 MG/100ML; G/100ML; MG/100ML
150 INJECTION, SOLUTION INTRAVENOUS CONTINUOUS PRN
Status: DISCONTINUED | OUTPATIENT
Start: 2022-03-13 | End: 2022-03-13 | Stop reason: SDUPTHER

## 2022-03-13 RX ORDER — LORAZEPAM 2 MG/ML
0.25 INJECTION INTRAMUSCULAR EVERY 6 HOURS PRN
Status: DISCONTINUED | OUTPATIENT
Start: 2022-03-13 | End: 2022-03-19 | Stop reason: HOSPADM

## 2022-03-13 RX ORDER — SODIUM CHLORIDE 9 MG/ML
250 INJECTION, SOLUTION INTRAVENOUS CONTINUOUS PRN
Status: DISCONTINUED | OUTPATIENT
Start: 2022-03-13 | End: 2022-03-13 | Stop reason: SDUPTHER

## 2022-03-13 RX ORDER — ACETAMINOPHEN 325 MG/1
650 TABLET ORAL EVERY 4 HOURS PRN
Status: DISCONTINUED | OUTPATIENT
Start: 2022-03-13 | End: 2022-03-16

## 2022-03-13 RX ORDER — SODIUM CHLORIDE 450 MG/100ML
250 INJECTION, SOLUTION INTRAVENOUS CONTINUOUS PRN
Status: DISCONTINUED | OUTPATIENT
Start: 2022-03-13 | End: 2022-03-13

## 2022-03-13 RX ORDER — DEXTROSE, SODIUM CHLORIDE, AND POTASSIUM CHLORIDE 5; .45; .3 G/100ML; G/100ML; G/100ML
150 INJECTION INTRAVENOUS CONTINUOUS PRN
Status: DISCONTINUED | OUTPATIENT
Start: 2022-03-13 | End: 2022-03-13 | Stop reason: SDUPTHER

## 2022-03-13 RX ORDER — CALCIUM CARBONATE 200(500)MG
2 TABLET,CHEWABLE ORAL 3 TIMES DAILY PRN
Status: DISCONTINUED | OUTPATIENT
Start: 2022-03-13 | End: 2022-03-19 | Stop reason: HOSPADM

## 2022-03-13 RX ORDER — SODIUM CHLORIDE AND POTASSIUM CHLORIDE 150; 450 MG/100ML; MG/100ML
250 INJECTION, SOLUTION INTRAVENOUS CONTINUOUS PRN
Status: DISCONTINUED | OUTPATIENT
Start: 2022-03-13 | End: 2022-03-13

## 2022-03-13 RX ORDER — ASPIRIN 81 MG/1
81 TABLET ORAL DAILY
Status: DISCONTINUED | OUTPATIENT
Start: 2022-03-13 | End: 2022-03-16

## 2022-03-13 RX ADMIN — Medication 1000 UNITS: at 13:45

## 2022-03-13 RX ADMIN — POTASSIUM CHLORIDE 10 MEQ: 7.46 INJECTION, SOLUTION INTRAVENOUS at 10:18

## 2022-03-13 RX ADMIN — SODIUM CHLORIDE 1 G: 900 INJECTION INTRAVENOUS at 06:19

## 2022-03-13 RX ADMIN — ONDANSETRON 4 MG: 2 INJECTION INTRAMUSCULAR; INTRAVENOUS at 05:04

## 2022-03-13 RX ADMIN — Medication 1 TABLET: at 13:45

## 2022-03-13 RX ADMIN — TERAZOSIN HYDROCHLORIDE 1 MG: 1 CAPSULE ORAL at 21:42

## 2022-03-13 RX ADMIN — IOPAMIDOL 75 ML: 755 INJECTION, SOLUTION INTRAVENOUS at 01:41

## 2022-03-13 RX ADMIN — ASPIRIN 81 MG: 81 TABLET, COATED ORAL at 13:45

## 2022-03-13 RX ADMIN — POTASSIUM CHLORIDE AND SODIUM CHLORIDE 250 ML/HR: 450; 150 INJECTION, SOLUTION INTRAVENOUS at 11:36

## 2022-03-13 RX ADMIN — LABETALOL 20 MG/4 ML (5 MG/ML) INTRAVENOUS SYRINGE 20 MG: at 06:18

## 2022-03-13 RX ADMIN — METOCLOPRAMIDE 5 MG: 5 INJECTION, SOLUTION INTRAMUSCULAR; INTRAVENOUS at 00:55

## 2022-03-13 RX ADMIN — Medication 325 MG: at 13:46

## 2022-03-13 RX ADMIN — POTASSIUM CHLORIDE 10 MEQ: 7.46 INJECTION, SOLUTION INTRAVENOUS at 15:46

## 2022-03-13 RX ADMIN — INSULIN HUMAN 5 UNITS/HR: 1 INJECTION, SOLUTION INTRAVENOUS at 11:36

## 2022-03-13 RX ADMIN — POTASSIUM PHOSPHATE, MONOBASIC POTASSIUM PHOSPHATE, DIBASIC 15 MMOL: 224; 236 INJECTION, SOLUTION, CONCENTRATE INTRAVENOUS at 18:39

## 2022-03-13 RX ADMIN — POTASSIUM CHLORIDE 10 MEQ: 7.46 INJECTION, SOLUTION INTRAVENOUS at 08:49

## 2022-03-13 RX ADMIN — OXYCODONE HYDROCHLORIDE AND ACETAMINOPHEN 500 MG: 500 TABLET ORAL at 13:45

## 2022-03-13 RX ADMIN — ACETAMINOPHEN 650 MG: 650 SUPPOSITORY RECTAL at 12:39

## 2022-03-13 RX ADMIN — ACETAMINOPHEN 650 MG: 650 SUPPOSITORY RECTAL at 21:41

## 2022-03-13 RX ADMIN — SODIUM CHLORIDE 1000 ML: 9 INJECTION, SOLUTION INTRAVENOUS at 00:54

## 2022-03-13 RX ADMIN — DONEPEZIL HYDROCHLORIDE 5 MG: 5 TABLET, FILM COATED ORAL at 21:42

## 2022-03-13 RX ADMIN — Medication 5 MG: at 23:29

## 2022-03-13 RX ADMIN — SODIUM CHLORIDE 2000 ML: 9 INJECTION, SOLUTION INTRAVENOUS at 05:04

## 2022-03-13 RX ADMIN — POTASSIUM CHLORIDE, DEXTROSE MONOHYDRATE AND SODIUM CHLORIDE 150 ML/HR: 150; 5; 450 INJECTION, SOLUTION INTRAVENOUS at 14:31

## 2022-03-13 RX ADMIN — POTASSIUM CHLORIDE 10 MEQ: 7.46 INJECTION, SOLUTION INTRAVENOUS at 17:18

## 2022-03-13 RX ADMIN — FLUOXETINE HYDROCHLORIDE 20 MG: 20 CAPSULE ORAL at 13:46

## 2022-03-13 RX ADMIN — LORAZEPAM 0.25 MG: 2 INJECTION INTRAMUSCULAR; INTRAVENOUS at 12:38

## 2022-03-13 RX ADMIN — POTASSIUM CHLORIDE, DEXTROSE MONOHYDRATE AND SODIUM CHLORIDE 150 ML/HR: 150; 5; 450 INJECTION, SOLUTION INTRAVENOUS at 21:41

## 2022-03-13 RX ADMIN — PANTOPRAZOLE SODIUM 80 MG: 40 INJECTION, POWDER, LYOPHILIZED, FOR SOLUTION INTRAVENOUS at 00:54

## 2022-03-13 RX ADMIN — POTASSIUM CHLORIDE 10 MEQ: 7.46 INJECTION, SOLUTION INTRAVENOUS at 13:44

## 2022-03-13 RX ADMIN — NICARDIPINE HYDROCHLORIDE 5 MG/HR: 25 INJECTION, SOLUTION INTRAVENOUS at 06:47

## 2022-03-13 RX ADMIN — POTASSIUM CHLORIDE 10 MEQ: 7.46 INJECTION, SOLUTION INTRAVENOUS at 11:37

## 2022-03-13 RX ADMIN — SODIUM CHLORIDE 1 G: 900 INJECTION INTRAVENOUS at 23:29

## 2022-03-13 RX ADMIN — NICARDIPINE HYDROCHLORIDE 2.5 MG/HR: 25 INJECTION, SOLUTION INTRAVENOUS at 12:44

## 2022-03-13 RX ADMIN — HYDROMORPHONE HYDROCHLORIDE 0.25 MG: 1 INJECTION, SOLUTION INTRAMUSCULAR; INTRAVENOUS; SUBCUTANEOUS at 07:02

## 2022-03-13 RX ADMIN — LABETALOL 20 MG/4 ML (5 MG/ML) INTRAVENOUS SYRINGE 10 MG: at 05:04

## 2022-03-13 RX ADMIN — AMLODIPINE BESYLATE 10 MG: 10 TABLET ORAL at 13:45

## 2022-03-13 NOTE — CONSULTS
Nutrition Services    Patient Name:  Thelma Michael  YOB: 1960  MRN: [id number]  Admit Date:  3/12/2022    Consult received for pt 2/2 DKA. Pt slumbering soundly whenever attempted visit. Left consistent carbohydrate menu. Basic DM ed material and material r/t gastroparesis in room. Attempt visit 3/14.    Electronically signed by:  Ella Mcmanus RD  03/13/22 18:28 EDT

## 2022-03-13 NOTE — ED PROVIDER NOTES
[facility]    EMERGENCY DEPARTMENT ENCOUNTER      Pt Name: Thelma Michael  MRN: [id number]  YOB: 1960  Date of evaluation: 3/12/2022  Provider: JAMESON Rosario    CHIEF COMPLAINT       Chief Complaint   Patient presents with   • Abdominal Pain         HISTORY OF PRESENT ILLNESS  (Location/Symptom, Timing/Onset, Context/Setting, Quality, Duration, Modifying Factors, Severity.)   Thelma Michael is a 61 y.o. female who presents to the emergency department with complaints of nausea, vomiting, abdominal pain and diarrhea. Patient reports that her symptoms started yesterday but have worsened throughout the day today. Patient reports having little to eat or drink for that last three days. Patient denies anything specific that makes her symptoms better or worse. Denies any additional associated symptoms on exam.       History provided by:  Patient  History limited by:  Acuity of condition  Abdominal Pain  Pain location:  Generalized  Pain quality: aching    Pain radiates to:  Does not radiate  Pain severity:  Moderate  Onset quality:  Gradual  Duration:  3 days  Timing:  Constant  Progression:  Worsening  Chronicity:  Recurrent  Context: retching    Relieved by:  Nothing  Worsened by:  Nothing  Ineffective treatments:  None tried  Associated symptoms: diarrhea, fatigue, nausea and vomiting    Associated symptoms: no chest pain, no chills, no constipation, no cough, no dysuria, no fever and no shortness of breath    Risk factors: being elderly       Nursing notes were reviewed.    REVIEW OF SYSTEMS    (2-9 systems for level 4, 10 or more for level 5)   Review of Systems   Constitutional: Positive for activity change, appetite change and fatigue. Negative for chills and fever.   HENT: Negative.    Respiratory: Negative for cough, chest tightness and shortness of breath.    Cardiovascular: Negative for chest pain.   Gastrointestinal: Positive for abdominal pain, diarrhea, nausea and vomiting.  Negative for abdominal distention, blood in stool and constipation.   Genitourinary: Negative for dysuria.        All systems reviewed and negative except for those discussed in HPI.   PAST MEDICAL HISTORY     Past Medical History:   Diagnosis Date   • Acid reflux    • Acute bronchitis    • Cardiac murmur    • Diabetes mellitus (HCC)    • H/O echocardiogram 08/07/2012    i. LVEF 65%.ii. Mild LVH.iii. Borderline evidence of atrial septal aneurysm.  No PFO.    • History of nuclear stress test 08/22/2014    Negative for ischemia and scars; LVEF 77%.     • Hyperlipidemia    • Hypertension    • Impacted cerumen of both ears    • Migraine    • Self-catheterizes urinary bladder    • Sinusitis    • Stroke (HCC)    • Tobacco abuse     quit 4 days ago.     • Urticaria          SURGICAL HISTORY       Past Surgical History:   Procedure Laterality Date   • CAPSULE ENDOSCOPY  7/27/2021    Procedure: PILLCAM DEPLOYMENT;  Surgeon: Mikael Worthy MD;  Location:  [facility];  Service: Gastroenterology;;   • COLONOSCOPY     • COLONOSCOPY N/A 7/27/2021    Procedure: COLONOSCOPY;  Surgeon: Mikael Worthy MD;  Location:  [facility];  Service: Gastroenterology;  Laterality: N/A;   • DENTAL PROCEDURE     • ENDOSCOPY N/A 6/30/2021    Procedure: ESOPHAGOGASTRODUODENOSCOPY;  Surgeon: Brunner, Mark I, MD;  Location:  [facility];  Service: Gastroenterology;  Laterality: N/A;   • ENDOSCOPY N/A 7/27/2021    Procedure: ESOPHAGOGASTRODUODENOSCOPY;  Surgeon: Mikael Worthy MD;  Location:  [facility];  Service: Gastroenterology;  Laterality: N/A;   • NO PAST SURGERIES           CURRENT MEDICATIONS       Current Facility-Administered Medications:   •  !Home medications stored in central pharmacy, please obtain prior to discharge. , , Does not apply, BID, Leny Mayers, PharmD  •  acetaminophen (TYLENOL) tablet 650 mg, 650 mg, Oral, Q4H PRN **OR** acetaminophen (TYLENOL) 160 MG/5ML solution 650 mg, 650 mg,  Oral, Q4H PRN **OR** acetaminophen (TYLENOL) suppository 650 mg, 650 mg, Rectal, Q4H PRN, Quinton, Heather, APRN  •  albuterol (PROVENTIL) nebulizer solution 0.083% 2.5 mg/3mL, 2.5 mg, Nebulization, Q4H PRN, Quinton, Heather, APRN  •  amLODIPine (NORVASC) tablet 10 mg, 10 mg, Oral, Daily, Quinton, Heather, APRN  •  ascorbic acid (VITAMIN C) tablet 500 mg, 500 mg, Oral, Daily, Quinton, Heather, APRN  •  aspirin EC tablet 81 mg, 81 mg, Oral, Daily, Quinton, Heather, APRN  •  atorvastatin (LIPITOR) tablet 80 mg, 80 mg, Oral, Nightly, Quinton, Heather, APRN  •  calcium carbonate (TUMS) chewable tablet 500 mg (200 mg elemental), 2 tablet, Oral, TID PRN, Quinton, Heather, APRN  •  cholecalciferol (VITAMIN D3) tablet 1,000 Units, 1,000 Units, Oral, Daily, Quinton, Heather, APRN  •  dextrose (D50W) (25 g/50 mL) IV injection 12.5 g, 12.5 g, Intravenous, PRN, Fazal Antoine PA  •  dextrose 5 % and sodium chloride 0.45 % infusion, 150 mL/hr, Intravenous, Continuous PRN, Fazal Antoine PA  •  dextrose 5 % and sodium chloride 0.45 % with KCl 20 mEq/L infusion, 150 mL/hr, Intravenous, Continuous PRN, Fazal Antoine PA  •  dextrose 5 % and sodium chloride 0.45 % with KCl 40 mEq/L infusion, 150 mL/hr, Intravenous, Continuous PRN, Fazal Antoine PA  •  dextrose 5 % and sodium chloride 0.9 % infusion, 150 mL/hr, Intravenous, Continuous PRN, Fazal Antoine PA  •  dextrose 5 % and sodium chloride 0.9 % with KCl 20 mEq/L infusion, 150 mL/hr, Intravenous, Continuous PRN, Fazal Antoine PA  •  dextrose 5 % and sodium chloride 0.9 % with KCl 40 mEq/L infusion, 150 mL/hr, Intravenous, Continuous PRN, Fazal Antoine, PA  •  donepezil (ARICEPT) tablet 5 mg, 5 mg, Oral, Nightly, Quinton, Heather, APRN  •  ferrous sulfate tablet 325 mg, 325 mg, Oral, Daily With Breakfast, Quinton, Heather, APRN  •  FLUoxetine (PROzac) capsule 20 mg, 20 mg, Oral, Daily, Quinton, Heather, APRN  •  gabapentin (NEURONTIN) capsule 400 mg, 400 mg, Oral, BID  PRN, Quinton, Heather, APRN  •  insulin regular 1 unit/mL in 0.9% sodium chloride, 5 Units/hr, Intravenous, Titrated, Fazal Antoine PA, Held at 03/13/22 0630  •  melatonin tablet 5 mg, 5 mg, Oral, Nightly PRN, Quinton, Heather, APRN  •  multivitamin with minerals 1 tablet, 1 tablet, Oral, Daily, Quinton, Heather, APRN  •  niCARdipine (CARDENE) 25mg in 250mL NS infusion, 5-15 mg/hr, Intravenous, Titrated, Quinton, Heather, APRN, Stopped at 03/13/22 0942  •  potassium chloride (MICRO-K) CR capsule 40 mEq, 40 mEq, Oral, PRN **OR** potassium chloride (KLOR-CON) packet 40 mEq, 40 mEq, Oral, PRN **OR** potassium chloride 10 mEq in 100 mL IVPB, 10 mEq, Intravenous, Q1H PRN, Ivy Berry, DO, Last Rate: 100 mL/hr at 03/13/22 1018, 10 mEq at 03/13/22 1018  •  Sodium Chloride (PF) 0.9 % 10 mL, 10 mL, Intravenous, PRN, Stephan Ireland MD  •  sodium chloride 0.45 % 1,000 mL with potassium chloride 40 mEq infusion, 250 mL/hr, Intravenous, Continuous PRN, Fazal Antoine PA  •  sodium chloride 0.45 % infusion, 250 mL/hr, Intravenous, Continuous PRN, Fazal Antoine PA  •  sodium chloride 0.45 % with KCl 20 mEq/L infusion, 250 mL/hr, Intravenous, Continuous PRN, Fazal Antoine PA  •  sodium chloride 0.9 % flush 10 mL, 10 mL, Intravenous, Q12H, Fazal Antoine PA  •  sodium chloride 0.9 % flush 10 mL, 10 mL, Intravenous, PRN, Fazal Antoine PA  •  sodium chloride 0.9 % flush 10 mL, 10 mL, Intravenous, Once PRN, Fazal Antoine PA  •  sodium chloride 0.9 % flush 10 mL, 10 mL, Intravenous, Q12H, Quinton, Heather, APRN  •  sodium chloride 0.9 % flush 10 mL, 10 mL, Intravenous, Q12H, Heather Alcantara APRN  •  sodium chloride 0.9 % infusion, 250 mL/hr, Intravenous, Continuous PRN, Fazal Antoine PA  •  sodium chloride 0.9 % infusion, 10 mL/hr, Intravenous, Continuous PRN, Fazal Antoine PA  •  sodium chloride 0.9 % with KCl 20 mEq/L infusion, 250 mL/hr, Intravenous, Continuous PRN, Fazal Antoine PA  •  sodium chloride  0.9 % with KCl 40 mEq/L infusion, 250 mL/hr, Intravenous, Continuous PRN, Fazal Antoine PA  •  terazosin (HYTRIN) capsule 1 mg, 1 mg, Oral, Nightly, Quinton, Heather, APRN  •  traMADol (ULTRAM) tablet 50 mg, 50 mg, Oral, Q6H PRN, Quinton, Heather, APRN    ALLERGIES     Patient has no known allergies.    FAMILY HISTORY       Family History   Problem Relation Age of Onset   • Anxiety disorder Other    • Arthritis Other    • ADD / ADHD Other    • Heart disease Other         cardiac disorder   • Depression Other    • Diabetes Other    • Hyperlipidemia Other    • Hypertension Other    • Lung cancer Other    • Osteoporosis Other    • Hypertension Brother    • Diabetes Brother    • Hyperlipidemia Mother    • Hypertension Mother    • Colon cancer Neg Hx           SOCIAL HISTORY       Social History     Socioeconomic History   • Marital status:    Tobacco Use   • Smoking status: Former Smoker     Quit date: 2019     Years since quittin.7   • Smokeless tobacco: Never Used   • Tobacco comment: BC PL never smoker    Vaping Use   • Vaping Use: Never used   Substance and Sexual Activity   • Alcohol use: Yes     Alcohol/week: 1.0 standard drink     Types: 1 Glasses of wine per week     Comment: ocassional   • Drug use: No   • Sexual activity: Defer     Comment: Single          PHYSICAL EXAM    (up to 7 for level 4, 8 or more for level 5)   Physical Exam  Vitals and nursing note reviewed.   Constitutional:       General: She is in acute distress.      Appearance: Normal appearance. She is ill-appearing and toxic-appearing.   HENT:      Head: Normocephalic and atraumatic.      Nose: Nose normal.   Eyes:      General: Scleral icterus present.      Extraocular Movements: Extraocular movements intact.   Cardiovascular:      Rate and Rhythm: Tachycardia present.   Pulmonary:      Effort: Pulmonary effort is normal. No respiratory distress.   Abdominal:      General: Bowel sounds are normal. There is no distension.       Palpations: Abdomen is soft.      Tenderness: There is generalized abdominal tenderness.   Musculoskeletal:         General: Normal range of motion.      Cervical back: Normal range of motion.   Skin:     General: Skin is warm and dry.   Neurological:      General: No focal deficit present.      Mental Status: She is alert.   Psychiatric:         Mood and Affect: Mood normal.         Behavior: Behavior normal.         Thought Content: Thought content normal.         Judgment: Judgment normal.          DIAGNOSTIC RESULTS     EKG: All EKGs are interpreted by the Emergency Department Physician who either signs or Co-signs this chart in the absence of a cardiologist.    ECG 12 Lead   Preliminary Result         ECG 12 Lead   Preliminary Result             RADIOLOGY:   Non-plain film images such as CT, Ultrasound and MRI are read by the radiologist. Plain radiographic images are visualized and preliminarily interpreted by the emergency physician with the below findings:      [x] Radiologist's Report Reviewed:  CT Angiogram Abdomen Pelvis   Final Result         1. No acute process seen within the abdomen or pelvis.   2. Diverticulosis without evidence of diverticulitis.               Electronically signed by:  Jimmie Luna D.O.     3/13/2022 12:15 AM Mountain Time      XR Chest 2 View    (Results Pending)         ED BEDSIDE ULTRASOUND:   Performed by ED Physician - none    LABS:    I have reviewed and interpreted all of the currently available lab results from this visit (if applicable):  Results for orders placed or performed during the hospital encounter of 03/12/22   Respiratory Panel PCR w/COVID-19(SARS-CoV-2) [facility] In-House, NP Swab in UTM/VTM, 3-4 HR TAT - Swab, Nasopharynx    Specimen: Nasopharynx; Swab   Result Value Ref Range    ADENOVIRUS, PCR Not Detected Not Detected    Coronavirus 229E Not Detected Not Detected    Coronavirus HKU1 Not Detected Not Detected    Coronavirus NL63 Not  Detected Not Detected    Coronavirus OC43 Not Detected Not Detected    COVID19 Not Detected Not Detected - Ref. Range    Human Metapneumovirus Not Detected Not Detected    Human Rhinovirus/Enterovirus Not Detected Not Detected    Influenza A PCR Not Detected Not Detected    Influenza B PCR Not Detected Not Detected    Parainfluenza Virus 1 Not Detected Not Detected    Parainfluenza Virus 2 Not Detected Not Detected    Parainfluenza Virus 3 Not Detected Not Detected    Parainfluenza Virus 4 Not Detected Not Detected    RSV, PCR Not Detected Not Detected    Bordetella pertussis pcr Not Detected Not Detected    Bordetella parapertussis PCR Not Detected Not Detected    Chlamydophila pneumoniae PCR Not Detected Not Detected    Mycoplasma pneumo by PCR Not Detected Not Detected   Comprehensive Metabolic Panel    Specimen: Blood   Result Value Ref Range    Glucose 306 (H) 65 - 99 mg/dL    BUN 16 8 - 23 mg/dL    Creatinine 1.07 (H) 0.57 - 1.00 mg/dL    Sodium 141 136 - 145 mmol/L    Potassium 3.5 3.5 - 5.2 mmol/L    Chloride 100 98 - 107 mmol/L    CO2 18.0 (L) 22.0 - 29.0 mmol/L    Calcium 10.0 8.6 - 10.5 mg/dL    Total Protein 7.8 6.0 - 8.5 g/dL    Albumin 4.50 3.50 - 5.20 g/dL    ALT (SGPT) 17 1 - 33 U/L    AST (SGOT) 17 1 - 32 U/L    Alkaline Phosphatase 91 39 - 117 U/L    Total Bilirubin 0.7 0.0 - 1.2 mg/dL    Globulin 3.3 gm/dL    A/G Ratio 1.4 g/dL    BUN/Creatinine Ratio 15.0 7.0 - 25.0    Anion Gap 23.0 (H) 5.0 - 15.0 mmol/L    eGFR 58.5 (L) >60.0 mL/min/1.73   Lipase    Specimen: Blood   Result Value Ref Range    Lipase 25 13 - 60 U/L   Urinalysis With Microscopic If Indicated (No Culture) - Urine, Clean Catch    Specimen: Urine, Clean Catch   Result Value Ref Range    Color, UA Yellow Yellow, Straw    Appearance, UA Cloudy (A) Clear    pH, UA 7.0 5.0 - 8.0    Specific Gravity, UA 1.018 1.001 - 1.030    Glucose,  mg/dL (2+) (A) Negative    Ketones, UA 15 mg/dL (1+) (A) Negative    Bilirubin, UA Negative  Negative    Blood, UA Trace (A) Negative    Protein, UA >=300 mg/dL (3+) (A) Negative    Leuk Esterase, UA Negative Negative    Nitrite, UA Negative Negative    Urobilinogen, UA 1.0 E.U./dL 0.2 - 1.0 E.U./dL   Lactic Acid, Plasma    Specimen: Blood   Result Value Ref Range    Lactate 3.5 (C) 0.5 - 2.0 mmol/L   CBC Auto Differential    Specimen: Blood   Result Value Ref Range    WBC 8.65 3.40 - 10.80 10*3/mm3    RBC 4.43 3.77 - 5.28 10*6/mm3    Hemoglobin 13.3 12.0 - 15.9 g/dL    Hematocrit 38.4 34.0 - 46.6 %    MCV 86.7 79.0 - 97.0 fL    MCH 30.0 26.6 - 33.0 pg    MCHC 34.6 31.5 - 35.7 g/dL    RDW 12.6 12.3 - 15.4 %    RDW-SD 39.8 37.0 - 54.0 fl    MPV 9.4 6.0 - 12.0 fL    Platelets 519 (H) 140 - 450 10*3/mm3    Neutrophil % 84.9 (H) 42.7 - 76.0 %    Lymphocyte % 11.6 (L) 19.6 - 45.3 %    Monocyte % 2.7 (L) 5.0 - 12.0 %    Eosinophil % 0.1 (L) 0.3 - 6.2 %    Basophil % 0.2 0.0 - 1.5 %    Immature Grans % 0.5 0.0 - 0.5 %    Neutrophils, Absolute 7.35 (H) 1.70 - 7.00 10*3/mm3    Lymphocytes, Absolute 1.00 0.70 - 3.10 10*3/mm3    Monocytes, Absolute 0.23 0.10 - 0.90 10*3/mm3    Eosinophils, Absolute 0.01 0.00 - 0.40 10*3/mm3    Basophils, Absolute 0.02 0.00 - 0.20 10*3/mm3    Immature Grans, Absolute 0.04 0.00 - 0.05 10*3/mm3    nRBC 0.0 0.0 - 0.2 /100 WBC   Ammonia    Specimen: Blood   Result Value Ref Range    Ammonia 10 (L) 11 - 51 umol/L   Procalcitonin    Specimen: Blood   Result Value Ref Range    Procalcitonin 0.07 0.00 - 0.25 ng/mL   Troponin    Specimen: Blood   Result Value Ref Range    Troponin T 0.012 0.000 - 0.030 ng/mL   STAT Lactic Acid, Reflex    Specimen: Blood   Result Value Ref Range    Lactate 3.3 (C) 0.5 - 2.0 mmol/L   Urinalysis, Microscopic Only - Urine, Clean Catch    Specimen: Urine, Clean Catch   Result Value Ref Range    RBC, UA 21-30 (A) None Seen, 0-2 /HPF    WBC, UA 0-2 None Seen, 0-2 /HPF    Bacteria, UA 4+ (A) None Seen, Trace /HPF    Squamous Epithelial Cells, UA 0-2 None Seen, 0-2 /HPF     Hyaline Casts, UA 0-6 0 - 6 /LPF    Methodology Automated Microscopy    Comprehensive Metabolic Panel    Specimen: Blood   Result Value Ref Range    Glucose 342 (H) 65 - 99 mg/dL    BUN 13 8 - 23 mg/dL    Creatinine 0.90 0.57 - 1.00 mg/dL    Sodium 140 136 - 145 mmol/L    Potassium 2.9 (L) 3.5 - 5.2 mmol/L    Chloride 102 98 - 107 mmol/L    CO2 19.0 (L) 22.0 - 29.0 mmol/L    Calcium 9.0 8.6 - 10.5 mg/dL    Total Protein 7.1 6.0 - 8.5 g/dL    Albumin 4.20 3.50 - 5.20 g/dL    ALT (SGPT) 16 1 - 33 U/L    AST (SGOT) 30 1 - 32 U/L    Alkaline Phosphatase 84 39 - 117 U/L    Total Bilirubin 0.6 0.0 - 1.2 mg/dL    Globulin 2.9 gm/dL    A/G Ratio 1.4 g/dL    BUN/Creatinine Ratio 14.4 7.0 - 25.0    Anion Gap 19.0 (H) 5.0 - 15.0 mmol/L    eGFR 72.0 >60.0 mL/min/1.73   Phosphorus    Specimen: Blood   Result Value Ref Range    Phosphorus 2.5 2.5 - 4.5 mg/dL   Magnesium    Specimen: Blood   Result Value Ref Range    Magnesium 1.8 1.6 - 2.4 mg/dL   Osmolality, Serum    Specimen: Blood   Result Value Ref Range    Osmolality 307 (H) 275 - 295 mOsm/kg   Hemoglobin A1c    Specimen: Blood   Result Value Ref Range    Hemoglobin A1C 8.40 (H) 4.80 - 5.60 %   Basic Metabolic Panel    Specimen: Blood   Result Value Ref Range    Glucose 389 (H) 65 - 99 mg/dL    BUN 14 8 - 23 mg/dL    Creatinine 0.88 0.57 - 1.00 mg/dL    Sodium 140 136 - 145 mmol/L    Potassium 3.3 (L) 3.5 - 5.2 mmol/L    Chloride 105 98 - 107 mmol/L    CO2 19.0 (L) 22.0 - 29.0 mmol/L    Calcium 8.5 (L) 8.6 - 10.5 mg/dL    BUN/Creatinine Ratio 15.9 7.0 - 25.0    Anion Gap 16.0 (H) 5.0 - 15.0 mmol/L    eGFR 73.9 >60.0 mL/min/1.73   Magnesium    Specimen: Blood   Result Value Ref Range    Magnesium 1.5 (L) 1.6 - 2.4 mg/dL   Phosphorus    Specimen: Blood   Result Value Ref Range    Phosphorus 3.8 2.5 - 4.5 mg/dL   CBC Auto Differential    Specimen: Blood   Result Value Ref Range    WBC 12.31 (H) 3.40 - 10.80 10*3/mm3    RBC 3.87 3.77 - 5.28 10*6/mm3    Hemoglobin 11.7 (L)  12.0 - 15.9 g/dL    Hematocrit 34.2 34.0 - 46.6 %    MCV 88.4 79.0 - 97.0 fL    MCH 30.2 26.6 - 33.0 pg    MCHC 34.2 31.5 - 35.7 g/dL    RDW 12.5 12.3 - 15.4 %    RDW-SD 40.9 37.0 - 54.0 fl    MPV 9.3 6.0 - 12.0 fL    Platelets 433 140 - 450 10*3/mm3    Neutrophil % 94.5 (H) 42.7 - 76.0 %    Lymphocyte % 4.0 (L) 19.6 - 45.3 %    Monocyte % 1.0 (L) 5.0 - 12.0 %    Eosinophil % 0.0 (L) 0.3 - 6.2 %    Basophil % 0.2 0.0 - 1.5 %    Immature Grans % 0.3 0.0 - 0.5 %    Neutrophils, Absolute 11.64 (H) 1.70 - 7.00 10*3/mm3    Lymphocytes, Absolute 0.49 (L) 0.70 - 3.10 10*3/mm3    Monocytes, Absolute 0.12 0.10 - 0.90 10*3/mm3    Eosinophils, Absolute 0.00 0.00 - 0.40 10*3/mm3    Basophils, Absolute 0.02 0.00 - 0.20 10*3/mm3    Immature Grans, Absolute 0.04 0.00 - 0.05 10*3/mm3    nRBC 0.0 0.0 - 0.2 /100 WBC   Blood Gas, Arterial With Co-Ox    Specimen: Arterial Blood   Result Value Ref Range    Site Right Brachial     Sherwin's Test N/A     pH, Arterial 7.489 (H) 7.350 - 7.450 pH units    pCO2, Arterial 26.6 (L) 35.0 - 45.0 mm Hg    pO2, Arterial 99.9 83.0 - 108.0 mm Hg    HCO3, Arterial 20.2 20.0 - 26.0 mmol/L    Base Excess, Arterial -1.9 (L) 0.0 - 2.0 mmol/L    Hemoglobin, Blood Gas 12.2 (L) 14 - 18 g/dL    Hematocrit, Blood Gas 37.5 (L) 38.0 - 51.0 %    Oxyhemoglobin 97.5 94 - 99 %    Methemoglobin 0.50 0.00 - 1.50 %    Carboxyhemoglobin 0.7 0 - 2 %    CO2 Content 21.0 (L) 22 - 33 mmol/L    Temperature 37.0 C    Barometric Pressure for Blood Gas      Modality Room Air     FIO2 21 %    Rate 0 Breaths/minute    PIP 0 cmH2O    IPAP 0     EPAP 0     Note      pH, Temp Corrected 7.489 pH Units    pCO2, Temperature Corrected 26.6 (L) 35 - 45 mm Hg    pO2, Temperature Corrected 99.9 83 - 108 mm Hg   Beta Hydroxybutyrate Quantitative    Specimen: Blood   Result Value Ref Range    Beta-Hydroxybutyrate Quant 1.900 (H) 0.020 - 0.270 mmol/L   Beta Hydroxybutyrate Quantitative    Specimen: Blood   Result Value Ref Range     "Beta-Hydroxybutyrate Quant 2.164 (H) 0.020 - 0.270 mmol/L   Magnesium    Specimen: Blood   Result Value Ref Range    Magnesium 1.6 1.6 - 2.4 mg/dL   Osmolality, Serum    Specimen: Blood   Result Value Ref Range    Osmolality 307 (H) 275 - 295 mOsm/kg   POC Creatinine    Specimen: Blood   Result Value Ref Range    Creatinine 0.60 0.60 - 1.30 mg/dL   POC Glucose Once    Specimen: Blood   Result Value Ref Range    Glucose 316 (H) 70 - 130 mg/dL   POC Glucose Once    Specimen: Blood   Result Value Ref Range    Glucose 307 (H) 70 - 130 mg/dL   POC Glucose Once    Specimen: Blood   Result Value Ref Range    Glucose 357 (H) 70 - 130 mg/dL   POC Glucose Once    Specimen: Blood   Result Value Ref Range    Glucose 379 (H) 70 - 130 mg/dL   POC Glucose Once    Specimen: Blood   Result Value Ref Range    Glucose 375 (H) 70 - 130 mg/dL   POC Glucose Once    Specimen: Blood   Result Value Ref Range    Glucose 398 (H) 70 - 130 mg/dL   Green Top (Gel)   Result Value Ref Range    Extra Tube Hold for add-ons.    Lavender Top   Result Value Ref Range    Extra Tube hold for add-on    Gold Top - SST   Result Value Ref Range    Extra Tube Hold for add-ons.    Gray Top   Result Value Ref Range    Extra Tube Hold for add-ons.    Light Blue Top   Result Value Ref Range    Extra Tube hold for add-on         All other labs were within normal range or not returned as of this dictation.      EMERGENCY DEPARTMENT COURSE and DIFFERENTIAL DIAGNOSIS/MDM:   Vitals:    Vitals:    03/13/22 0825 03/13/22 0911 03/13/22 0942 03/13/22 1023   BP: 160/58 161/66 139/68 (!) 182/73   BP Location: Left arm Right arm  Right arm   Patient Position: Lying Lying  Sitting   Pulse: 97 96 94 97   Resp: 18      Temp: 99.7 °F (37.6 °C)      TempSrc: Oral      SpO2:       Weight: 55.2 kg (121 lb 12.8 oz)      Height: 172.7 cm (68\")          ED Course as of 03/13/22 1138   Sun Mar 13, 2022   1132 In summary this is a 61 year old female who is type 1 diabetic who presents " to the ED with complaints of nausea, vomiting, diarrhea and abdominal pain for the last 2 days. CTA abdomen/pelvis without evidence of acute abdominal process. Care delayed as a result of prolonged wait time for CMP. Symptoms managed initially and upon completion of work up patient was found to be in DKA. Protocol initiated and patient admitted to Hospitalist for further evaluation and treatment.  [JG]      ED Course User Index  [JG] Fazal Antoine, PA       MDM  Number of Diagnoses or Management Options  Non-intractable vomiting with nausea, unspecified vomiting type: new, needed workup  Type 1 diabetes mellitus with ketoacidosis without coma (HCC): new, needed workup     Amount and/or Complexity of Data Reviewed  Clinical lab tests: reviewed  Tests in the radiology section of CPT®: reviewed    Risk of Complications, Morbidity, and/or Mortality  Presenting problems: high  Diagnostic procedures: moderate  Management options: moderate    Patient Progress  Patient progress: stable           MEDICATIONS ADMINISTERED IN ED:  Medications   Sodium Chloride (PF) 0.9 % 10 mL (has no administration in time range)   sodium chloride 0.9 % flush 10 mL (10 mL Intravenous Not Given 3/13/22 0956)   sodium chloride 0.9 % flush 10 mL (has no administration in time range)   sodium chloride 0.9 % infusion (has no administration in time range)   sodium chloride 0.9 % with KCl 20 mEq/L infusion (has no administration in time range)   sodium chloride 0.9 % with KCl 40 mEq/L infusion (has no administration in time range)   dextrose 5 % and sodium chloride 0.9 % infusion (has no administration in time range)   dextrose 5 % and sodium chloride 0.9 % with KCl 40 mEq/L infusion (has no administration in time range)   dextrose 5 % and sodium chloride 0.9 % with KCl 20 mEq/L infusion (has no administration in time range)   sodium chloride 0.45 % infusion (has no administration in time range)   sodium chloride 0.45 % with KCl 20 mEq/L infusion  (has no administration in time range)   sodium chloride 0.45 % 1,000 mL with potassium chloride 40 mEq infusion (has no administration in time range)   dextrose 5 % and sodium chloride 0.45 % infusion (has no administration in time range)   dextrose 5 % and sodium chloride 0.45 % with KCl 20 mEq/L infusion (has no administration in time range)   dextrose 5 % and sodium chloride 0.45 % with KCl 40 mEq/L infusion (has no administration in time range)   insulin regular 1 unit/mL in 0.9% sodium chloride (0 Units/hr Intravenous Hold 3/13/22 0630)   dextrose (D50W) (25 g/50 mL) IV injection 12.5 g (has no administration in time range)   sodium chloride 0.9 % flush 10 mL (has no administration in time range)   sodium chloride 0.9 % infusion (has no administration in time range)   albuterol (PROVENTIL) nebulizer solution 0.083% 2.5 mg/3mL (has no administration in time range)   amLODIPine (NORVASC) tablet 10 mg (has no administration in time range)   ascorbic acid (VITAMIN C) tablet 500 mg (has no administration in time range)   aspirin EC tablet 81 mg (has no administration in time range)   atorvastatin (LIPITOR) tablet 80 mg (has no administration in time range)   calcium carbonate (TUMS) chewable tablet 500 mg (200 mg elemental) (has no administration in time range)   donepezil (ARICEPT) tablet 5 mg (has no administration in time range)   terazosin (HYTRIN) capsule 1 mg (has no administration in time range)   ferrous sulfate tablet 325 mg (has no administration in time range)   FLUoxetine (PROzac) capsule 20 mg (has no administration in time range)   gabapentin (NEURONTIN) capsule 400 mg (has no administration in time range)   melatonin tablet 5 mg (has no administration in time range)   multivitamin with minerals 1 tablet (has no administration in time range)   traMADol (ULTRAM) tablet 50 mg (has no administration in time range)   cholecalciferol (VITAMIN D3) tablet 1,000 Units (has no administration in time range)    sodium chloride 0.9 % flush 10 mL (has no administration in time range)   sodium chloride 0.9 % flush 10 mL (has no administration in time range)   acetaminophen (TYLENOL) tablet 650 mg (has no administration in time range)     Or   acetaminophen (TYLENOL) 160 MG/5ML solution 650 mg (has no administration in time range)     Or   acetaminophen (TYLENOL) suppository 650 mg (has no administration in time range)   niCARdipine (CARDENE) 25mg in 250mL NS infusion (0 mg/hr Intravenous Hold 3/13/22 1027)   potassium chloride (MICRO-K) CR capsule 40 mEq ( Oral Not Given:  See Alt 3/13/22 1018)     Or   potassium chloride (KLOR-CON) packet 40 mEq ( Oral Not Given:  See Alt 3/13/22 1018)     Or   potassium chloride 10 mEq in 100 mL IVPB (10 mEq Intravenous New Bag 3/13/22 1018)   !Home medications stored in central pharmacy, please obtain prior to discharge.  (has no administration in time range)   sodium chloride 0.9 % bolus 1,000 mL (0 mL Intravenous Stopped 3/13/22 0124)   metoclopramide (REGLAN) injection 5 mg (5 mg Intravenous Given 3/13/22 0055)   pantoprazole (PROTONIX) injection 80 mg (80 mg Intravenous Given 3/13/22 0054)   ondansetron ODT (ZOFRAN-ODT) disintegrating tablet 4 mg (4 mg Oral Given 3/12/22 2323)   iopamidol (ISOVUE-370) 76 % injection 100 mL (75 mL Intravenous Given 3/13/22 0141)   labetalol (NORMODYNE,TRANDATE) injection 10 mg (10 mg Intravenous Given 3/13/22 0504)   sodium chloride 0.9 % bolus 2,000 mL (2,000 mL Intravenous New Bag 3/13/22 0504)   ondansetron (ZOFRAN) injection 4 mg (4 mg Intravenous Given 3/13/22 0504)   cefTRIAXone (ROCEPHIN) 1 g/100 mL 0.9% NS (MBP) (1 g Intravenous New Bag 3/13/22 0619)   labetalol (NORMODYNE,TRANDATE) injection 20 mg (20 mg Intravenous Given 3/13/22 0618)   HYDROmorphone (DILAUDID) injection 0.25 mg (0.25 mg Intravenous Given 3/13/22 0702)       PROCEDURES:  Procedures          CRITICAL CARE TIME    Total Critical Care time was 0 minutes, excluding separately  reportable procedures.   There was a high probability of clinically significant/life threatening deterioration in the patient's condition which required my urgent intervention.      FINAL IMPRESSION      1. Type 1 diabetes mellitus with ketoacidosis without coma (HCC)    2. Non-intractable vomiting with nausea, unspecified vomiting type          DISPOSITION/PLAN     ED Disposition     ED Disposition   Decision to Admit    Condition   --    Comment   Level of Care: Telemetry [5]   Diagnosis: Type 1 diabetes mellitus with ketoacidosis without coma (HCC) [7212534]   Admitting Physician: BART FONSECA [[id number]]   Attending Physician: BART FONSECA [[id number]]   Bed Request Comments: tele               PATIENT REFERRED TO:  No follow-up provider specified.    DISCHARGE MEDICATIONS:     Medication List      CONTINUE taking these medications    BD Pen Needle Cydney U/F 32G X 4 MM misc  Generic drug: Insulin Pen Needle  Take 1 each by mouth 4 (Four) Times a Day.     Dexcom G6  device  1 kit Every 3 (Three) Months.     Dexcom G6 Sensor  Every 10 (Ten) Days.     Dexcom G6 Transmitter misc  1 kit Every 3 (Three) Months.     FreeStyle Lite w/Device kit     glucose monitor monitoring kit  1 each 4 (Four) Times a Day.        ASK your doctor about these medications    acetaminophen 325 MG tablet  Commonly known as: TYLENOL  Take 2 tablets by mouth Every 4 (Four) Hours As Needed for Mild Pain .     albuterol sulfate  (90 Base) MCG/ACT inhaler  Commonly known as: PROVENTIL HFA;VENTOLIN HFA;PROAIR HFA  Inhale 2 puffs Every 4 (Four) Hours As Needed for Wheezing.     amLODIPine 10 MG tablet  Commonly known as: NORVASC  Take 1 tablet by mouth Daily.     aspirin 81 MG EC tablet  Take 1 tablet by mouth Daily.     atorvastatin 80 MG tablet  Commonly known as: LIPITOR  Take 1 tablet by mouth Every Night.     BASAGLAR KWIKPEN 100 UNIT/ML injection pen  Inject 20 Units under the skin into the appropriate area as directed Every  Night.     Benefiber Drink Mix pack  Take 1 Scoop by mouth Daily.     calcium carbonate 500 MG chewable tablet  Commonly known as: TUMS  Chew 1,000 mg 3 (Three) Times a Day As Needed for Indigestion or Heartburn.     donepezil 5 MG tablet  Commonly known as: Aricept  Take 1 tablet by mouth Every Night.     doxazosin 1 MG tablet  Commonly known as: CARDURA  Take 1 tablet by mouth Every Night.     ferrous sulfate 325 (65 FE) MG tablet  Take 1 tablet by mouth Daily With Breakfast. Take with orange juice or vitamin C     FLUoxetine 20 MG capsule  Commonly known as: PROzac  Take 1 capsule by mouth Daily.     gabapentin 400 MG capsule  Commonly known as: NEURONTIN  Take 1 capsule by mouth 2 (Two) Times a Day As Needed (leg and foot pain).     * glucose blood test strip  Commonly known as: Glucose Meter Test  Use as instructed to check blood glucose levels 3 times daily     * glucose blood test strip  Use as instructed     hydroCHLOROthiazide 12.5 MG tablet  Commonly known as: HYDRODIURIL  Take 1 tablet by mouth Daily.     Insulin Lispro (1 Unit Dial) 100 UNIT/ML solution pen-injector  Commonly known as: HumaLOG KwikPen  15 units tid     melatonin 5 MG tablet tablet  Take 1 tablet by mouth At Night As Needed (sleep).     multivitamin tablet tablet  Take 1 tablet by mouth Daily.     Multivitamin-Minerals tablet     ondansetron 4 MG tablet  Commonly known as: ZOFRAN  Take 1 tablet by mouth Every 6 (Six) Hours As Needed for Nausea or Vomiting.     pantoprazole 40 MG EC tablet  Commonly known as: PROTONIX  Take 1 tablet by mouth Daily.     sennosides-docusate 8.6-50 MG per tablet  Commonly known as: PERICOLACE  Take 2 tablets by mouth 2 (Two) Times a Day As Needed for Constipation.     traMADol 50 MG tablet  Commonly known as: ULTRAM  Take 1 tablet by mouth Every 6 (Six) Hours As Needed for Moderate Pain .     traZODone 50 MG tablet  Commonly known as: DESYREL  TAKE 1-2 TABLETS ONE HOUR BEFORE BEDTIME AS NEEDED FOR SLEEP.      VITAMIN C PO     vitamin D 1.25 MG (62786 UT) capsule capsule  Commonly known as: ERGOCALCIFEROL  Take 1 capsule by mouth once a week         * This list has 2 medication(s) that are the same as other medications prescribed for you. Read the directions carefully, and ask your doctor or other care provider to review them with you.                    Comment: Please note this report has been produced using speech recognition software.      JAMESON Rosario Jason C, PA  03/13/22 1136

## 2022-03-13 NOTE — H&P
[facility] Medicine Services  HISTORY AND PHYSICAL    Patient Name: Thelma Michael  : 1960  MRN: [id number]  Primary Care Physician: Monet Howe APRN  Date of admission: 3/12/2022    Subjective   Subjective     Chief Complaint:  Nausea/vomiting/diarrhea, abdominal pain     HPI:  Thelma Michael is a 61 y.o. female past medical history significant for type 1 diabetes, diabetic gastroparesis, dyslipidemia, GERD, essential hypertension, depression anxiety presents the ED with complaints of nausea vomiting diarrhea and abdominal pain started yesterday.  Patient describes her abdominal pain as cramping throughout.  She reports inability to keep down anything in several days.  She denies any fever, cough, shortness of air, chest pain, dysuria or melena.  Since being in the ED she has had 3 liquid BM's.  She denies any recently prescribed antibiotics.  Workup in the Ed tonight is consistent with DKA.  CT of abdomen and pelvis showed no acute process.  Patient notes that since she has not been feeling well she has not taken any of her insulin.  Patient will be admitted to [facility] under the care of the Hospitalist for further evaluation and treatment.       COVID Details:    Symptoms:    [] NONE [] Fever []  Cough [] Shortness of breath [] Change in taste/smell      Review of Systems   Constitutional: Positive for appetite change and chills. Negative for activity change, diaphoresis, fatigue, fever and unexpected weight change.   HENT: Negative.    Eyes: Negative for photophobia and visual disturbance.   Respiratory: Negative for cough and shortness of breath.    Cardiovascular: Negative for chest pain, palpitations and leg swelling.   Gastrointestinal: Positive for abdominal pain, diarrhea, nausea and vomiting. Negative for abdominal distention and blood in stool.   Genitourinary: Negative.    Musculoskeletal: Negative.    Skin: Negative.    Neurological: Negative.     Psychiatric/Behavioral: Negative.         All other systems reviewed and are negative.     Personal History     Past Medical History:   Diagnosis Date   • Acid reflux    • Acute bronchitis    • Cardiac murmur    • Diabetes mellitus (HCC)    • H/O echocardiogram 08/07/2012    i. LVEF 65%.ii. Mild LVH.iii. Borderline evidence of atrial septal aneurysm.  No PFO.    • History of nuclear stress test 08/22/2014    Negative for ischemia and scars; LVEF 77%.     • Hyperlipidemia    • Hypertension    • Impacted cerumen of both ears    • Migraine    • Self-catheterizes urinary bladder    • Sinusitis    • Stroke (HCC)    • Tobacco abuse     quit 4 days ago.     • Urticaria        Past Surgical History:   Procedure Laterality Date   • CAPSULE ENDOSCOPY  7/27/2021    Procedure: PILLCAM DEPLOYMENT;  Surgeon: Mikael Worthy MD;  Location:  [facility];  Service: Gastroenterology;;   • COLONOSCOPY     • COLONOSCOPY N/A 7/27/2021    Procedure: COLONOSCOPY;  Surgeon: Mikael Worthy MD;  Location:  [facility];  Service: Gastroenterology;  Laterality: N/A;   • DENTAL PROCEDURE     • ENDOSCOPY N/A 6/30/2021    Procedure: ESOPHAGOGASTRODUODENOSCOPY;  Surgeon: Brunner, Mark I, MD;  Location:  [facility];  Service: Gastroenterology;  Laterality: N/A;   • ENDOSCOPY N/A 7/27/2021    Procedure: ESOPHAGOGASTRODUODENOSCOPY;  Surgeon: Mikael Worthy MD;  Location:  [facility];  Service: Gastroenterology;  Laterality: N/A;   • NO PAST SURGERIES         Family History: family history includes ADD / ADHD in an other family member; Anxiety disorder in an other family member; Arthritis in an other family member; Depression in an other family member; Diabetes in her brother and another family member; Heart disease in an other family member; Hyperlipidemia in her mother and another family member; Hypertension in her brother, mother, and another family member; Lung cancer in an other family member; Osteoporosis in  an other family member. Otherwise pertinent FHx was reviewed and unremarkable.     Social History:  reports that she quit smoking about 2 years ago. She has never used smokeless tobacco. She reports current alcohol use of about 1.0 standard drink of alcohol per week. She reports that she does not use drugs.  Social History     Social History Narrative   • Not on file       Medications:  Ascorbic Acid, BASAGLAR KWIKPEN, Benefiber Drink Mix, Dexcom G6 , Dexcom G6 Sensor, Dexcom G6 Transmitter, FLUoxetine, FreeStyle Lite, Insulin Lispro (1 Unit Dial), Insulin Pen Needle, Multivitamin-Minerals, acetaminophen, albuterol sulfate HFA, amLODIPine, aspirin, atorvastatin, calcium carbonate, donepezil, doxazosin, ferrous sulfate, gabapentin, glucose blood, glucose monitor, hydroCHLOROthiazide, melatonin, multivitamin, ondansetron, pantoprazole, sennosides-docusate, traMADol, traZODone, and vitamin D    No Known Allergies    Objective   Objective     Vital Signs:   Temp:  [99 °F (37.2 °C)] 99 °F (37.2 °C)  Heart Rate:  [103-117] 111  Resp:  [18] 18  BP: (199-216)/() 216/107    Physical Exam  Vitals and nursing note reviewed.   Constitutional:       General: She is not in acute distress.     Appearance: Normal appearance. She is not ill-appearing, toxic-appearing or diaphoretic.   HENT:      Head: Normocephalic and atraumatic.      Nose: Nose normal.      Mouth/Throat:      Mouth: Mucous membranes are dry.      Pharynx: Oropharynx is clear.   Eyes:      General: No scleral icterus.        Right eye: No discharge.         Left eye: No discharge.      Extraocular Movements: Extraocular movements intact.      Conjunctiva/sclera: Conjunctivae normal.      Pupils: Pupils are equal, round, and reactive to light.   Cardiovascular:      Rate and Rhythm: Regular rhythm. Tachycardia present.      Pulses: Normal pulses.      Heart sounds: Normal heart sounds.   Pulmonary:      Effort: Pulmonary effort is normal.      Breath  sounds: Normal breath sounds.   Abdominal:      General: Bowel sounds are normal. There is no distension.      Palpations: Abdomen is soft. There is no mass.      Tenderness: There is abdominal tenderness. There is no right CVA tenderness, left CVA tenderness, guarding or rebound.      Hernia: No hernia is present.      Comments: Generalized tenderness throughout   Musculoskeletal:         General: No swelling, tenderness, deformity or signs of injury. Normal range of motion.      Cervical back: Normal range of motion and neck supple.      Right lower leg: No edema.      Left lower leg: No edema.   Skin:     General: Skin is warm and dry.   Neurological:      General: No focal deficit present.      Mental Status: She is alert and oriented to person, place, and time. Mental status is at baseline.   Psychiatric:         Mood and Affect: Mood normal.         Behavior: Behavior normal.         Thought Content: Thought content normal.         Judgment: Judgment normal.            Results Reviewed:  I have personally reviewed most recent indicated data and agree with findings including:  [x]  Laboratory  [x]  Radiology  [x]  EKG/Telemetry  []  Pathology  []  Cardiac/Vascular Studies  []  Old records  []  Other:  Most pertinent findings include:      LAB RESULTS:      Lab 03/13/22  0101 03/13/22  0058   WBC  --  8.65   HEMOGLOBIN  --  13.3   HEMATOCRIT  --  38.4   PLATELETS  --  519*   NEUTROS ABS  --  7.35*   IMMATURE GRANS (ABS)  --  0.04   LYMPHS ABS  --  1.00   MONOS ABS  --  0.23   EOS ABS  --  0.01   MCV  --  86.7   PROCALCITONIN 0.07  --    LACTATE  --  3.5*         Lab 03/13/22  0131 03/13/22 0101   SODIUM  --  141   POTASSIUM  --  3.5   CHLORIDE  --  100   CO2  --  18.0*   ANION GAP  --  23.0*   BUN  --  16   CREATININE 0.60 1.07*   EGFR  --  58.5*   GLUCOSE  --  306*   CALCIUM  --  10.0   MAGNESIUM  --  1.8   PHOSPHORUS  --  2.5         Lab 03/13/22 0101   TOTAL PROTEIN 7.8   ALBUMIN 4.50   GLOBULIN 3.3   ALT  (SGPT) 17   AST (SGOT) 17   BILIRUBIN 0.7   ALK PHOS 91   LIPASE 25         Lab 03/13/22  0101   TROPONIN T 0.012                 Lab 03/13/22  0516   PH, ARTERIAL 7.489*   PCO2, ARTERIAL 26.6*   PO2 ART 99.9   FIO2 21   HCO3 ART 20.2   BASE EXCESS ART -1.9*   CARBOXYHEMOGLOBIN 0.7     Brief Urine Lab Results  (Last result in the past 365 days)      Color   Clarity   Blood   Leuk Est   Nitrite   Protein   CREAT   Urine HCG        03/13/22 0121 Yellow   Cloudy   Trace   Negative   Negative   >=300 mg/dL (3+)               Microbiology Results (last 10 days)     ** No results found for the last 240 hours. **          CT Angiogram Abdomen Pelvis    Result Date: 3/13/2022  EXAMINATION: CTA ABDOMEN AND PELVIS WITH IV CONTRAST   DATE OF EXAMINATION: 3/13/2022. COMPARISON: 2/11/2022. INDICATION: Severe abdominal pain with nausea, vomiting and dark emesis. Jaundice. PROCEDURE:  Axial CT of the abdomen and pelvis was performed without and with contrast and sagittal and coronal reformatted images were performed.  75 mL of Isovue-370 was given intravenously. CT dose lowering techniques were used, to include: automated exposure control, adjustment for patient size, and/or use of iterative reconstruction. Maximum intensity projection 3-D reformats were performed. FINDINGS: LOWER CHEST :  The visualized lung bases are clear.  There are no pleural or pericardial effusions. ABDOMEN: Liver and Biliary system:  Normal. Adrenal glands:  Bilateral adrenal nodules appear unchanged most likely adenomas.. Kidneys and ureters: Subcentimeter hypodensities are seen within the left kidney that are too small fully characterize. The kidneys otherwise appear unremarkable. Spleen:  Normal. Pancreas:  Normal. Gallbladder:  Normal. Lymph nodes, Peritoneum and mesentery:  There is no mesenteric or retroperitoneal lymphadenopathy. Gastrointestinal tract:  There are no dilated loops of bowel or free intraperitoneal air.    The appendix is normal.  There is mild sigmoid diverticulosis without evidence of diverticulitis. Aorta/IVC:   There is moderate vascular calcification throughout the abdominal aorta without evidence of aneurysmal dilation or dissection.  IVC normal. Abdominal wall:  Normal. PELVIS: Fluid: There is no free fluid in the pelvis. Lymph Nodes:  There is no pelvic or inguinal lymphadenopathy.. Urinary bladder:  Lobular contour to the liver is unchanged.. BONES:  There are no osseous destructive lesions.. ADDITIONAL  SIGNIFICANT FINDINGS:  None.     Impression: 1. No acute process seen within the abdomen or pelvis. 2. Diverticulosis without evidence of diverticulitis. Electronically signed by:  Jimmie Luna D.O.  3/13/2022 12:15 AM Mountain Time      Results for orders placed during the hospital encounter of 11/19/19    Adult Transesophageal Echo (LIZANDRO) W/ Cont if Necessary Per Protocol    Interpretation Summary  · Left ventricular systolic function is normal. Estimated EF = 65%.  · Left ventricular wall thickness is consistent with hypertrophy.  · Small patent foramen ovale present. Saline test results are positive with valsalva manuever.  · There is mild mitral valve prolapse of the anterior mitral leaflet.  · Mild tricuspid valve regurgitation is present.  · Estimated right ventricular systolic pressure from tricuspid regurgitation is mildly elevated (35-45 mmHg).  · There is mild plaque in the descending aorta present.      Assessment/Plan   Assessment & Plan       Gastroesophageal reflux disease    Hyperlipidemia    Hypertensive urgency    Diabetic ketoacidosis without coma associated with type 1 diabetes mellitus (HCC)    Gastroparesis      61 year old female presents to the ED with with Nausea/vomiting/diarrhea and abdominal pain that began yesterday.     DKA  Type 1 Diabetes   -anion gap: 23.0, glucose 316, ABG noted above, UA: glucose and ketones present  -betahydroxy elevated  -start DKA protocol   -received 3L normal saline in the  ED  -NPO   -hgb A1c 8.40  -serial BMP   -q 1 hour fingersticks   -consult diabetes educator in am     Hypokalemia   - replaced prior to starting insulin gtt     Hypertensive urgency   -/107  -received labetalol 20 mg and 10 mg in the ED  -will start cardene drip if no improvement   -continue home norvasc, cardura and work to wean cardene gtt     Bacteriuria   -UA noted above   - Continue rocephin   -urine cultures pending     Diarrhea   -check stool culture  -monitor electrolytes  -IVF   -CT A/P no acute process     Gastroparesis   -follows with GI  -getting referral to [facility] gastrointestinal motility clinic   -on reglan     GERD  -PPI       DVT prophylaxis:  scds     CODE STATUS:  Full code        This note has been completed as part of a split-shared workflow.     Signature: Electronically signed by HOWIE Canseco, 03/13/22, 5:53 AM EDT.          Attending   Admission Attestation       I have seen and examined the patient, performing an independent face-to-face diagnostic evaluation with plan of care reviewed and developed with the advanced practice clinician (APC).      Brief Summary Statement:   Thelma Michael is a 61 y.o. female with history of DM1, gastroparesis, HTN, HLD, GERD who presented with n/v/d and inability to take her medications. States this has been present for multiple days. No sick contacts. No fever. Denies recent antibiotic use. No alleviating factors. States abd pain is currently improved but still feels very weak.     Remainder of detailed HPI is as noted by APC and has been reviewed and/or edited by me for completeness.    Attending Physical Exam:  Constitutional: Awake, alert; frail   Eyes: PERRLA, sclerae anicteric, no conjunctival injection  HENT: NCAT, mucous membranes dry  Neck: Supple, no thyromegaly, no lymphadenopathy, trachea midline  Respiratory: Clear to auscultation bilaterally, nonlabored respirations   Cardiovascular: RRR, no murmurs, rubs, or gallops,  palpable pedal pulses bilaterally  Gastrointestinal: Positive bowel sounds, soft, mild diffuse tenderness   Musculoskeletal: No bilateral ankle edema, no clubbing or cyanosis to extremities  Psychiatric: Appropriate affect, cooperative  Neurologic: Oriented x 3, strength symmetric in all extremities, Cranial Nerves grossly intact to confrontation, speech clear  Skin: No rashes      Brief Assessment/Plan :  See detailed assessment and plan developed with APC which I have reviewed and/or edited for completeness.        Admission Status: I believe that this patient meets INPATIENT status due to DKA requiring insulin gtt.  I feel patient’s risk for adverse outcomes and need for care warrant INPATIENT evaluation and I predict the patient’s care encounter to likely last beyond 2 midnights.        Ivy Berry,   03/13/22

## 2022-03-14 LAB
ANION GAP SERPL CALCULATED.3IONS-SCNC: 11 MMOL/L (ref 5–15)
ANION GAP SERPL CALCULATED.3IONS-SCNC: 12 MMOL/L (ref 5–15)
ANION GAP SERPL CALCULATED.3IONS-SCNC: 12 MMOL/L (ref 5–15)
ANION GAP SERPL CALCULATED.3IONS-SCNC: 14 MMOL/L (ref 5–15)
ANION GAP SERPL CALCULATED.3IONS-SCNC: 15 MMOL/L (ref 5–15)
BACTERIA SPEC AEROBE CULT: NORMAL
BASOPHILS # BLD AUTO: 0.01 10*3/MM3 (ref 0–0.2)
BASOPHILS NFR BLD AUTO: 0.1 % (ref 0–1.5)
BUN SERPL-MCNC: 4 MG/DL (ref 8–23)
BUN SERPL-MCNC: 5 MG/DL (ref 8–23)
BUN SERPL-MCNC: 5 MG/DL (ref 8–23)
BUN SERPL-MCNC: 7 MG/DL (ref 8–23)
BUN SERPL-MCNC: 7 MG/DL (ref 8–23)
BUN/CREAT SERPL: 5.2 (ref 7–25)
BUN/CREAT SERPL: 6.3 (ref 7–25)
BUN/CREAT SERPL: 6.8 (ref 7–25)
BUN/CREAT SERPL: 9.1 (ref 7–25)
BUN/CREAT SERPL: 9.5 (ref 7–25)
CALCIUM SPEC-SCNC: 8.1 MG/DL (ref 8.6–10.5)
CALCIUM SPEC-SCNC: 8.7 MG/DL (ref 8.6–10.5)
CALCIUM SPEC-SCNC: 8.7 MG/DL (ref 8.6–10.5)
CALCIUM SPEC-SCNC: 8.8 MG/DL (ref 8.6–10.5)
CALCIUM SPEC-SCNC: 9.3 MG/DL (ref 8.6–10.5)
CHLORIDE SERPL-SCNC: 104 MMOL/L (ref 98–107)
CHLORIDE SERPL-SCNC: 105 MMOL/L (ref 98–107)
CHLORIDE SERPL-SCNC: 106 MMOL/L (ref 98–107)
CO2 SERPL-SCNC: 17 MMOL/L (ref 22–29)
CO2 SERPL-SCNC: 18 MMOL/L (ref 22–29)
CO2 SERPL-SCNC: 19 MMOL/L (ref 22–29)
CO2 SERPL-SCNC: 19 MMOL/L (ref 22–29)
CO2 SERPL-SCNC: 21 MMOL/L (ref 22–29)
CREAT SERPL-MCNC: 0.73 MG/DL (ref 0.57–1)
CREAT SERPL-MCNC: 0.74 MG/DL (ref 0.57–1)
CREAT SERPL-MCNC: 0.77 MG/DL (ref 0.57–1)
CREAT SERPL-MCNC: 0.77 MG/DL (ref 0.57–1)
CREAT SERPL-MCNC: 0.8 MG/DL (ref 0.57–1)
DEPRECATED RDW RBC AUTO: 42.4 FL (ref 37–54)
EGFRCR SERPLBLD CKD-EPI 2021: 82.9 ML/MIN/1.73
EGFRCR SERPLBLD CKD-EPI 2021: 86.8 ML/MIN/1.73
EGFRCR SERPLBLD CKD-EPI 2021: 86.8 ML/MIN/1.73
EGFRCR SERPLBLD CKD-EPI 2021: 91 ML/MIN/1.73
EGFRCR SERPLBLD CKD-EPI 2021: 92.5 ML/MIN/1.73
EOSINOPHIL # BLD AUTO: 0 10*3/MM3 (ref 0–0.4)
EOSINOPHIL NFR BLD AUTO: 0 % (ref 0.3–6.2)
ERYTHROCYTE [DISTWIDTH] IN BLOOD BY AUTOMATED COUNT: 12.7 % (ref 12.3–15.4)
GLUCOSE BLDC GLUCOMTR-MCNC: 120 MG/DL (ref 70–130)
GLUCOSE BLDC GLUCOMTR-MCNC: 130 MG/DL (ref 70–130)
GLUCOSE BLDC GLUCOMTR-MCNC: 135 MG/DL (ref 70–130)
GLUCOSE BLDC GLUCOMTR-MCNC: 153 MG/DL (ref 70–130)
GLUCOSE BLDC GLUCOMTR-MCNC: 154 MG/DL (ref 70–130)
GLUCOSE BLDC GLUCOMTR-MCNC: 155 MG/DL (ref 70–130)
GLUCOSE BLDC GLUCOMTR-MCNC: 160 MG/DL (ref 70–130)
GLUCOSE BLDC GLUCOMTR-MCNC: 176 MG/DL (ref 70–130)
GLUCOSE BLDC GLUCOMTR-MCNC: 199 MG/DL (ref 70–130)
GLUCOSE BLDC GLUCOMTR-MCNC: 206 MG/DL (ref 70–130)
GLUCOSE BLDC GLUCOMTR-MCNC: 213 MG/DL (ref 70–130)
GLUCOSE BLDC GLUCOMTR-MCNC: 213 MG/DL (ref 70–130)
GLUCOSE BLDC GLUCOMTR-MCNC: 214 MG/DL (ref 70–130)
GLUCOSE BLDC GLUCOMTR-MCNC: 215 MG/DL (ref 70–130)
GLUCOSE BLDC GLUCOMTR-MCNC: 218 MG/DL (ref 70–130)
GLUCOSE BLDC GLUCOMTR-MCNC: 226 MG/DL (ref 70–130)
GLUCOSE BLDC GLUCOMTR-MCNC: 226 MG/DL (ref 70–130)
GLUCOSE BLDC GLUCOMTR-MCNC: 251 MG/DL (ref 70–130)
GLUCOSE BLDC GLUCOMTR-MCNC: 256 MG/DL (ref 70–130)
GLUCOSE BLDC GLUCOMTR-MCNC: 259 MG/DL (ref 70–130)
GLUCOSE BLDC GLUCOMTR-MCNC: 294 MG/DL (ref 70–130)
GLUCOSE BLDC GLUCOMTR-MCNC: 86 MG/DL (ref 70–130)
GLUCOSE BLDC GLUCOMTR-MCNC: 97 MG/DL (ref 70–130)
GLUCOSE SERPL-MCNC: 123 MG/DL (ref 65–99)
GLUCOSE SERPL-MCNC: 173 MG/DL (ref 65–99)
GLUCOSE SERPL-MCNC: 197 MG/DL (ref 65–99)
GLUCOSE SERPL-MCNC: 217 MG/DL (ref 65–99)
GLUCOSE SERPL-MCNC: 281 MG/DL (ref 65–99)
HCT VFR BLD AUTO: 31.9 % (ref 34–46.6)
HGB BLD-MCNC: 10.5 G/DL (ref 12–15.9)
IMM GRANULOCYTES # BLD AUTO: 0.03 10*3/MM3 (ref 0–0.05)
IMM GRANULOCYTES NFR BLD AUTO: 0.4 % (ref 0–0.5)
LYMPHOCYTES # BLD AUTO: 1.8 10*3/MM3 (ref 0.7–3.1)
LYMPHOCYTES NFR BLD AUTO: 22 % (ref 19.6–45.3)
MAGNESIUM SERPL-MCNC: 1.4 MG/DL (ref 1.6–2.4)
MAGNESIUM SERPL-MCNC: 2 MG/DL (ref 1.6–2.4)
MAGNESIUM SERPL-MCNC: 2.1 MG/DL (ref 1.6–2.4)
MAGNESIUM SERPL-MCNC: 2.2 MG/DL (ref 1.6–2.4)
MAGNESIUM SERPL-MCNC: 2.8 MG/DL (ref 1.6–2.4)
MCH RBC QN AUTO: 30.3 PG (ref 26.6–33)
MCHC RBC AUTO-ENTMCNC: 32.9 G/DL (ref 31.5–35.7)
MCV RBC AUTO: 91.9 FL (ref 79–97)
MONOCYTES # BLD AUTO: 0.61 10*3/MM3 (ref 0.1–0.9)
MONOCYTES NFR BLD AUTO: 7.4 % (ref 5–12)
NEUTROPHILS NFR BLD AUTO: 5.75 10*3/MM3 (ref 1.7–7)
NEUTROPHILS NFR BLD AUTO: 70.1 % (ref 42.7–76)
NRBC BLD AUTO-RTO: 0 /100 WBC (ref 0–0.2)
PHOSPHATE SERPL-MCNC: 1.3 MG/DL (ref 2.5–4.5)
PHOSPHATE SERPL-MCNC: 2.8 MG/DL (ref 2.5–4.5)
PHOSPHATE SERPL-MCNC: 2.8 MG/DL (ref 2.5–4.5)
PHOSPHATE SERPL-MCNC: 2.9 MG/DL (ref 2.5–4.5)
PHOSPHATE SERPL-MCNC: 3 MG/DL (ref 2.5–4.5)
PLATELET # BLD AUTO: 335 10*3/MM3 (ref 140–450)
PMV BLD AUTO: 9.4 FL (ref 6–12)
POTASSIUM SERPL-SCNC: 3.3 MMOL/L (ref 3.5–5.2)
POTASSIUM SERPL-SCNC: 3.4 MMOL/L (ref 3.5–5.2)
POTASSIUM SERPL-SCNC: 3.6 MMOL/L (ref 3.5–5.2)
POTASSIUM SERPL-SCNC: 3.6 MMOL/L (ref 3.5–5.2)
POTASSIUM SERPL-SCNC: 3.7 MMOL/L (ref 3.5–5.2)
RBC # BLD AUTO: 3.47 10*6/MM3 (ref 3.77–5.28)
SODIUM SERPL-SCNC: 135 MMOL/L (ref 136–145)
SODIUM SERPL-SCNC: 137 MMOL/L (ref 136–145)
SODIUM SERPL-SCNC: 137 MMOL/L (ref 136–145)
SODIUM SERPL-SCNC: 138 MMOL/L (ref 136–145)
SODIUM SERPL-SCNC: 138 MMOL/L (ref 136–145)
WBC NRBC COR # BLD: 8.2 10*3/MM3 (ref 3.4–10.8)

## 2022-03-14 PROCEDURE — 85025 COMPLETE CBC W/AUTO DIFF WBC: CPT | Performed by: NURSE PRACTITIONER

## 2022-03-14 PROCEDURE — 80048 BASIC METABOLIC PNL TOTAL CA: CPT | Performed by: NURSE PRACTITIONER

## 2022-03-14 PROCEDURE — 94799 UNLISTED PULMONARY SVC/PX: CPT

## 2022-03-14 PROCEDURE — 83735 ASSAY OF MAGNESIUM: CPT | Performed by: NURSE PRACTITIONER

## 2022-03-14 PROCEDURE — 0 MAGNESIUM SULFATE 4 GM/100ML SOLUTION: Performed by: INTERNAL MEDICINE

## 2022-03-14 PROCEDURE — 25010000002 CEFTRIAXONE PER 250 MG: Performed by: INTERNAL MEDICINE

## 2022-03-14 PROCEDURE — 82962 GLUCOSE BLOOD TEST: CPT

## 2022-03-14 PROCEDURE — 0 POTASSIUM CHLORIDE 10 MEQ/100ML SOLUTION: Performed by: INTERNAL MEDICINE

## 2022-03-14 PROCEDURE — 93010 ELECTROCARDIOGRAM REPORT: CPT | Performed by: INTERNAL MEDICINE

## 2022-03-14 PROCEDURE — 84100 ASSAY OF PHOSPHORUS: CPT | Performed by: NURSE PRACTITIONER

## 2022-03-14 PROCEDURE — 25010000002 LORAZEPAM PER 2 MG: Performed by: INTERNAL MEDICINE

## 2022-03-14 PROCEDURE — 93005 ELECTROCARDIOGRAM TRACING: CPT | Performed by: INTERNAL MEDICINE

## 2022-03-14 PROCEDURE — 25010000002 HYDRALAZINE PER 20 MG: Performed by: INTERNAL MEDICINE

## 2022-03-14 PROCEDURE — 97165 OT EVAL LOW COMPLEX 30 MIN: CPT

## 2022-03-14 PROCEDURE — 25010000002 ONDANSETRON PER 1 MG: Performed by: INTERNAL MEDICINE

## 2022-03-14 PROCEDURE — 25010000002 METOCLOPRAMIDE PER 10 MG: Performed by: INTERNAL MEDICINE

## 2022-03-14 PROCEDURE — 99232 SBSQ HOSP IP/OBS MODERATE 35: CPT | Performed by: INTERNAL MEDICINE

## 2022-03-14 PROCEDURE — 97530 THERAPEUTIC ACTIVITIES: CPT

## 2022-03-14 RX ORDER — ONDANSETRON 2 MG/ML
4 INJECTION INTRAMUSCULAR; INTRAVENOUS ONCE
Status: COMPLETED | OUTPATIENT
Start: 2022-03-14 | End: 2022-03-14

## 2022-03-14 RX ORDER — HYDRALAZINE HYDROCHLORIDE 10 MG/1
10 TABLET, FILM COATED ORAL EVERY 8 HOURS SCHEDULED
Status: DISCONTINUED | OUTPATIENT
Start: 2022-03-14 | End: 2022-03-16

## 2022-03-14 RX ORDER — METOCLOPRAMIDE HYDROCHLORIDE 5 MG/ML
5 INJECTION INTRAMUSCULAR; INTRAVENOUS EVERY 6 HOURS SCHEDULED
Status: DISCONTINUED | OUTPATIENT
Start: 2022-03-14 | End: 2022-03-18

## 2022-03-14 RX ADMIN — ONDANSETRON 4 MG: 2 INJECTION INTRAMUSCULAR; INTRAVENOUS at 10:26

## 2022-03-14 RX ADMIN — METOCLOPRAMIDE 5 MG: 5 INJECTION, SOLUTION INTRAMUSCULAR; INTRAVENOUS at 17:34

## 2022-03-14 RX ADMIN — HYDRALAZINE HYDROCHLORIDE 10 MG: 10 TABLET ORAL at 22:44

## 2022-03-14 RX ADMIN — POTASSIUM CHLORIDE, DEXTROSE MONOHYDRATE AND SODIUM CHLORIDE 150 ML/HR: 150; 5; 450 INJECTION, SOLUTION INTRAVENOUS at 11:15

## 2022-03-14 RX ADMIN — ACETAMINOPHEN 650 MG: 325 TABLET ORAL at 23:06

## 2022-03-14 RX ADMIN — LORAZEPAM 0.25 MG: 2 INJECTION INTRAMUSCULAR; INTRAVENOUS at 04:52

## 2022-03-14 RX ADMIN — AMLODIPINE BESYLATE 10 MG: 10 TABLET ORAL at 11:17

## 2022-03-14 RX ADMIN — POTASSIUM CHLORIDE 10 MEQ: 7.46 INJECTION, SOLUTION INTRAVENOUS at 23:06

## 2022-03-14 RX ADMIN — DEXTROSE MONOHYDRATE 12.5 G: 500 INJECTION PARENTERAL at 20:32

## 2022-03-14 RX ADMIN — DONEPEZIL HYDROCHLORIDE 5 MG: 5 TABLET, FILM COATED ORAL at 22:45

## 2022-03-14 RX ADMIN — MAGNESIUM SULFATE HEPTAHYDRATE 4 G: 40 INJECTION, SOLUTION INTRAVENOUS at 00:19

## 2022-03-14 RX ADMIN — FLUOXETINE HYDROCHLORIDE 20 MG: 20 CAPSULE ORAL at 11:25

## 2022-03-14 RX ADMIN — LORAZEPAM 0.25 MG: 2 INJECTION INTRAMUSCULAR; INTRAVENOUS at 13:38

## 2022-03-14 RX ADMIN — METOCLOPRAMIDE 5 MG: 5 INJECTION, SOLUTION INTRAMUSCULAR; INTRAVENOUS at 11:18

## 2022-03-14 RX ADMIN — ACETAMINOPHEN 650 MG: 650 SUPPOSITORY RECTAL at 14:36

## 2022-03-14 RX ADMIN — POTASSIUM CHLORIDE, DEXTROSE MONOHYDRATE AND SODIUM CHLORIDE 150 ML/HR: 150; 5; 450 INJECTION, SOLUTION INTRAVENOUS at 17:39

## 2022-03-14 RX ADMIN — POTASSIUM PHOSPHATE, MONOBASIC POTASSIUM PHOSPHATE, DIBASIC 30 MMOL: 224; 236 INJECTION, SOLUTION, CONCENTRATE INTRAVENOUS at 14:36

## 2022-03-14 RX ADMIN — DEXTROSE MONOHYDRATE 12.5 G: 500 INJECTION PARENTERAL at 21:32

## 2022-03-14 RX ADMIN — POTASSIUM CHLORIDE, DEXTROSE MONOHYDRATE AND SODIUM CHLORIDE 150 ML/HR: 150; 5; 450 INJECTION, SOLUTION INTRAVENOUS at 04:12

## 2022-03-14 RX ADMIN — HYDRALAZINE HYDROCHLORIDE 10 MG: 20 INJECTION INTRAMUSCULAR; INTRAVENOUS at 12:28

## 2022-03-14 RX ADMIN — ALBUTEROL SULFATE 2.5 MG: 2.5 SOLUTION RESPIRATORY (INHALATION) at 10:46

## 2022-03-14 RX ADMIN — HYDRALAZINE HYDROCHLORIDE 10 MG: 10 TABLET ORAL at 16:46

## 2022-03-14 RX ADMIN — TERAZOSIN HYDROCHLORIDE 1 MG: 1 CAPSULE ORAL at 22:44

## 2022-03-14 RX ADMIN — Medication 5 MG: at 22:51

## 2022-03-14 RX ADMIN — ATORVASTATIN CALCIUM 80 MG: 40 TABLET, FILM COATED ORAL at 22:45

## 2022-03-14 NOTE — PAYOR COMM NOTE
"Alisa Michael (61 y.o. Female)     [place] RN [phone], fax [phone]            Date of Birth   1960    Social Security Number       Address   [address] [place] [address]    Home Phone   [phone]    MRN   [id number]       Restoration   [facility]    Marital Status                               Admission Date   3/12/22    Admission Type   Emergency    Admitting Provider   Ivy Berry DO    Attending Provider   Ivy Berry DO    Department, Room/Bed   [facility] 3E, S340/1       Discharge Date       Discharge Disposition       Discharge Destination                               Attending Provider: Ivy Berry DO    Allergies: No Known Allergies    Isolation: None   Infection: None   Code Status: CPR   Advance Care Planning Activity    Ht: 172.7 cm (68\")   Wt: 54 kg (119 lb)    Admission Cmt: None   Principal Problem: None                Active Insurance as of 3/12/2022     Primary Coverage     Payor Plan Insurance Group Employer/Plan Group    [employer] [place] [id number]     Payor Plan Address Payor Plan Phone Number Payor Plan Fax Number Effective Dates    [address] [address] [phone]  2020 - None Entered    [facility] [id number]       Subscriber Name Subscriber Birth Date Member ID       ALISA MICHAEL 1960 [id number]                 Emergency Contacts      (Rel.) Home Phone Work Phone Mobile Phone    VINAYAK PENN (Son) [phone] -- [phone]    EVELINE CANTRELL (Daughter) -- -- [phone]               History & Physical      Ivy Berry DO at 22 0539              [facility] Medicine Services  HISTORY AND PHYSICAL    Patient Name: Alisa Michael  : 1960  MRN: [id number]  Primary Care Physician: Monet Howe APRN  Date of admission: 3/12/2022    Subjective   Subjective     Chief Complaint:  Nausea/vomiting/diarrhea, abdominal pain "     HPI:  Thelma Michael is a 61 y.o. female past medical history significant for type 1 diabetes, diabetic gastroparesis, dyslipidemia, GERD, essential hypertension, depression anxiety presents the ED with complaints of nausea vomiting diarrhea and abdominal pain started yesterday.  Patient describes her abdominal pain as cramping throughout.  She reports inability to keep down anything in several days.  She denies any fever, cough, shortness of air, chest pain, dysuria or melena.  Since being in the ED she has had 3 liquid BM's.  She denies any recently prescribed antibiotics.  Workup in the Ed tonight is consistent with DKA.  CT of abdomen and pelvis showed no acute process.  Patient notes that since she has not been feeling well she has not taken any of her insulin.  Patient will be admitted to [facility] under the care of the Hospitalist for further evaluation and treatment.       COVID Details:    Symptoms:    [] NONE [] Fever []  Cough [] Shortness of breath [] Change in taste/smell      Review of Systems   Constitutional: Positive for appetite change and chills. Negative for activity change, diaphoresis, fatigue, fever and unexpected weight change.   HENT: Negative.    Eyes: Negative for photophobia and visual disturbance.   Respiratory: Negative for cough and shortness of breath.    Cardiovascular: Negative for chest pain, palpitations and leg swelling.   Gastrointestinal: Positive for abdominal pain, diarrhea, nausea and vomiting. Negative for abdominal distention and blood in stool.   Genitourinary: Negative.    Musculoskeletal: Negative.    Skin: Negative.    Neurological: Negative.    Psychiatric/Behavioral: Negative.         All other systems reviewed and are negative.     Personal History     Past Medical History:   Diagnosis Date   • Acid reflux    • Acute bronchitis    • Cardiac murmur    • Diabetes mellitus (HCC)    • H/O echocardiogram 08/07/2012    i. LVEF 65%.ii. Mild LVH.iii. Borderline evidence  of atrial septal aneurysm.  No PFO.    • History of nuclear stress test 08/22/2014    Negative for ischemia and scars; LVEF 77%.     • Hyperlipidemia    • Hypertension    • Impacted cerumen of both ears    • Migraine    • Self-catheterizes urinary bladder    • Sinusitis    • Stroke (HCC)    • Tobacco abuse     quit 4 days ago.     • Urticaria        Past Surgical History:   Procedure Laterality Date   • CAPSULE ENDOSCOPY  7/27/2021    Procedure: PILLCAM DEPLOYMENT;  Surgeon: Mikael Worthy MD;  Location:  [facility];  Service: Gastroenterology;;   • COLONOSCOPY     • COLONOSCOPY N/A 7/27/2021    Procedure: COLONOSCOPY;  Surgeon: Mikael Worthy MD;  Location:  [facility];  Service: Gastroenterology;  Laterality: N/A;   • DENTAL PROCEDURE     • ENDOSCOPY N/A 6/30/2021    Procedure: ESOPHAGOGASTRODUODENOSCOPY;  Surgeon: Brunner, Mark I, MD;  Location:  [facility];  Service: Gastroenterology;  Laterality: N/A;   • ENDOSCOPY N/A 7/27/2021    Procedure: ESOPHAGOGASTRODUODENOSCOPY;  Surgeon: Mikael Worthy MD;  Location:  [facility];  Service: Gastroenterology;  Laterality: N/A;   • NO PAST SURGERIES         Family History: family history includes ADD / ADHD in an other family member; Anxiety disorder in an other family member; Arthritis in an other family member; Depression in an other family member; Diabetes in her brother and another family member; Heart disease in an other family member; Hyperlipidemia in her mother and another family member; Hypertension in her brother, mother, and another family member; Lung cancer in an other family member; Osteoporosis in an other family member. Otherwise pertinent FHx was reviewed and unremarkable.     Social History:  reports that she quit smoking about 2 years ago. She has never used smokeless tobacco. She reports current alcohol use of about 1.0 standard drink of alcohol per week. She reports that she does not use drugs.  Social History      Social History Narrative   • Not on file       Medications:  Ascorbic Acid, BASAGLAR KWIKPEN, Benefiber Drink Mix, Dexcom G6 , Dexcom G6 Sensor, Dexcom G6 Transmitter, FLUoxetine, FreeStyle Lite, Insulin Lispro (1 Unit Dial), Insulin Pen Needle, Multivitamin-Minerals, acetaminophen, albuterol sulfate HFA, amLODIPine, aspirin, atorvastatin, calcium carbonate, donepezil, doxazosin, ferrous sulfate, gabapentin, glucose blood, glucose monitor, hydroCHLOROthiazide, melatonin, multivitamin, ondansetron, pantoprazole, sennosides-docusate, traMADol, traZODone, and vitamin D    No Known Allergies    Objective   Objective     Vital Signs:   Temp:  [99 °F (37.2 °C)] 99 °F (37.2 °C)  Heart Rate:  [103-117] 111  Resp:  [18] 18  BP: (199-216)/() 216/107    Physical Exam  Vitals and nursing note reviewed.   Constitutional:       General: She is not in acute distress.     Appearance: Normal appearance. She is not ill-appearing, toxic-appearing or diaphoretic.   HENT:      Head: Normocephalic and atraumatic.      Nose: Nose normal.      Mouth/Throat:      Mouth: Mucous membranes are dry.      Pharynx: Oropharynx is clear.   Eyes:      General: No scleral icterus.        Right eye: No discharge.         Left eye: No discharge.      Extraocular Movements: Extraocular movements intact.      Conjunctiva/sclera: Conjunctivae normal.      Pupils: Pupils are equal, round, and reactive to light.   Cardiovascular:      Rate and Rhythm: Regular rhythm. Tachycardia present.      Pulses: Normal pulses.      Heart sounds: Normal heart sounds.   Pulmonary:      Effort: Pulmonary effort is normal.      Breath sounds: Normal breath sounds.   Abdominal:      General: Bowel sounds are normal. There is no distension.      Palpations: Abdomen is soft. There is no mass.      Tenderness: There is abdominal tenderness. There is no right CVA tenderness, left CVA tenderness, guarding or rebound.      Hernia: No hernia is present.       Comments: Generalized tenderness throughout   Musculoskeletal:         General: No swelling, tenderness, deformity or signs of injury. Normal range of motion.      Cervical back: Normal range of motion and neck supple.      Right lower leg: No edema.      Left lower leg: No edema.   Skin:     General: Skin is warm and dry.   Neurological:      General: No focal deficit present.      Mental Status: She is alert and oriented to person, place, and time. Mental status is at baseline.   Psychiatric:         Mood and Affect: Mood normal.         Behavior: Behavior normal.         Thought Content: Thought content normal.         Judgment: Judgment normal.            Results Reviewed:  I have personally reviewed most recent indicated data and agree with findings including:  [x]  Laboratory  [x]  Radiology  [x]  EKG/Telemetry  []  Pathology  []  Cardiac/Vascular Studies  []  Old records  []  Other:  Most pertinent findings include:      LAB RESULTS:      Lab 03/13/22  0101 03/13/22  0058   WBC  --  8.65   HEMOGLOBIN  --  13.3   HEMATOCRIT  --  38.4   PLATELETS  --  519*   NEUTROS ABS  --  7.35*   IMMATURE GRANS (ABS)  --  0.04   LYMPHS ABS  --  1.00   MONOS ABS  --  0.23   EOS ABS  --  0.01   MCV  --  86.7   PROCALCITONIN 0.07  --    LACTATE  --  3.5*         Lab 03/13/22  0131 03/13/22  0101   SODIUM  --  141   POTASSIUM  --  3.5   CHLORIDE  --  100   CO2  --  18.0*   ANION GAP  --  23.0*   BUN  --  16   CREATININE 0.60 1.07*   EGFR  --  58.5*   GLUCOSE  --  306*   CALCIUM  --  10.0   MAGNESIUM  --  1.8   PHOSPHORUS  --  2.5         Lab 03/13/22  0101   TOTAL PROTEIN 7.8   ALBUMIN 4.50   GLOBULIN 3.3   ALT (SGPT) 17   AST (SGOT) 17   BILIRUBIN 0.7   ALK PHOS 91   LIPASE 25         Lab 03/13/22  0101   TROPONIN T 0.012                 Lab 03/13/22  0516   PH, ARTERIAL 7.489*   PCO2, ARTERIAL 26.6*   PO2 ART 99.9   FIO2 21   HCO3 ART 20.2   BASE EXCESS ART -1.9*   CARBOXYHEMOGLOBIN 0.7     Brief Urine Lab Results  (Last  result in the past 365 days)      Color   Clarity   Blood   Leuk Est   Nitrite   Protein   CREAT   Urine HCG        03/13/22 0121 Yellow   Cloudy   Trace   Negative   Negative   >=300 mg/dL (3+)               Microbiology Results (last 10 days)     ** No results found for the last 240 hours. **          CT Angiogram Abdomen Pelvis    Result Date: 3/13/2022  EXAMINATION: CTA ABDOMEN AND PELVIS WITH IV CONTRAST   DATE OF EXAMINATION: 3/13/2022. COMPARISON: 2/11/2022. INDICATION: Severe abdominal pain with nausea, vomiting and dark emesis. Jaundice. PROCEDURE:  Axial CT of the abdomen and pelvis was performed without and with contrast and sagittal and coronal reformatted images were performed.  75 mL of Isovue-370 was given intravenously. CT dose lowering techniques were used, to include: automated exposure control, adjustment for patient size, and/or use of iterative reconstruction. Maximum intensity projection 3-D reformats were performed. FINDINGS: LOWER CHEST :  The visualized lung bases are clear.  There are no pleural or pericardial effusions. ABDOMEN: Liver and Biliary system:  Normal. Adrenal glands:  Bilateral adrenal nodules appear unchanged most likely adenomas.. Kidneys and ureters: Subcentimeter hypodensities are seen within the left kidney that are too small fully characterize. The kidneys otherwise appear unremarkable. Spleen:  Normal. Pancreas:  Normal. Gallbladder:  Normal. Lymph nodes, Peritoneum and mesentery:  There is no mesenteric or retroperitoneal lymphadenopathy. Gastrointestinal tract:  There are no dilated loops of bowel or free intraperitoneal air.    The appendix is normal. There is mild sigmoid diverticulosis without evidence of diverticulitis. Aorta/IVC:   There is moderate vascular calcification throughout the abdominal aorta without evidence of aneurysmal dilation or dissection.  IVC normal. Abdominal wall:  Normal. PELVIS: Fluid: There is no free fluid in the pelvis. Lymph Nodes:   There is no pelvic or inguinal lymphadenopathy.. Urinary bladder:  Lobular contour to the liver is unchanged.. BONES:  There are no osseous destructive lesions.. ADDITIONAL  SIGNIFICANT FINDINGS:  None.     Impression: 1. No acute process seen within the abdomen or pelvis. 2. Diverticulosis without evidence of diverticulitis. Electronically signed by:  Jimmie Luna D.O.  3/13/2022 12:15 AM Mountain Time      Results for orders placed during the hospital encounter of 11/19/19    Adult Transesophageal Echo (LIZANDRO) W/ Cont if Necessary Per Protocol    Interpretation Summary  · Left ventricular systolic function is normal. Estimated EF = 65%.  · Left ventricular wall thickness is consistent with hypertrophy.  · Small patent foramen ovale present. Saline test results are positive with valsalva manuever.  · There is mild mitral valve prolapse of the anterior mitral leaflet.  · Mild tricuspid valve regurgitation is present.  · Estimated right ventricular systolic pressure from tricuspid regurgitation is mildly elevated (35-45 mmHg).  · There is mild plaque in the descending aorta present.      Assessment/Plan   Assessment & Plan       Gastroesophageal reflux disease    Hyperlipidemia    Hypertensive urgency    Diabetic ketoacidosis without coma associated with type 1 diabetes mellitus (HCC)    Gastroparesis      61 year old female presents to the ED with with Nausea/vomiting/diarrhea and abdominal pain that began yesterday.     DKA  Type 1 Diabetes   -anion gap: 23.0, glucose 316, ABG noted above, UA: glucose and ketones present  -betahydroxy elevated  -start DKA protocol   -received 3L normal saline in the ED  -NPO   -hgb A1c 8.40  -serial BMP   -q 1 hour fingersticks   -consult diabetes educator in am     Hypokalemia   - replaced prior to starting insulin gtt     Hypertensive urgency   -/107  -received labetalol 20 mg and 10 mg in the ED  -will start cardene drip if no improvement   -continue home norvasc, cardura  and work to wean cardene gtt     Bacteriuria   -UA noted above   - Continue rocephin   -urine cultures pending     Diarrhea   -check stool culture  -monitor electrolytes  -IVF   -CT A/P no acute process     Gastroparesis   -follows with GI  -getting referral to [facility] gastrointestinal motility clinic   -on reglan     GERD  -PPI       DVT prophylaxis:  scds     CODE STATUS:  Full code        This note has been completed as part of a split-shared workflow.     Signature: Electronically signed by HOWIE Canseco, 03/13/22, 5:53 AM EDT.          Attending   Admission Attestation       I have seen and examined the patient, performing an independent face-to-face diagnostic evaluation with plan of care reviewed and developed with the advanced practice clinician (APC).      Brief Summary Statement:   Thelma Michael is a 61 y.o. female with history of DM1, gastroparesis, HTN, HLD, GERD who presented with n/v/d and inability to take her medications. States this has been present for multiple days. No sick contacts. No fever. Denies recent antibiotic use. No alleviating factors. States abd pain is currently improved but still feels very weak.     Remainder of detailed HPI is as noted by APC and has been reviewed and/or edited by me for completeness.    Attending Physical Exam:  Constitutional: Awake, alert; frail   Eyes: PERRLA, sclerae anicteric, no conjunctival injection  HENT: NCAT, mucous membranes dry  Neck: Supple, no thyromegaly, no lymphadenopathy, trachea midline  Respiratory: Clear to auscultation bilaterally, nonlabored respirations   Cardiovascular: RRR, no murmurs, rubs, or gallops, palpable pedal pulses bilaterally  Gastrointestinal: Positive bowel sounds, soft, mild diffuse tenderness   Musculoskeletal: No bilateral ankle edema, no clubbing or cyanosis to extremities  Psychiatric: Appropriate affect, cooperative  Neurologic: Oriented x 3, strength symmetric in all extremities, Cranial Nerves grossly  intact to confrontation, speech clear  Skin: No rashes      Brief Assessment/Plan :  See detailed assessment and plan developed with APC which I have reviewed and/or edited for completeness.        Admission Status: I believe that this patient meets INPATIENT status due to DKA requiring insulin gtt.  I feel patient’s risk for adverse outcomes and need for care warrant INPATIENT evaluation and I predict the patient’s care encounter to likely last beyond 2 midnights.        Ivy Berry DO  03/13/22                      Electronically signed by Ivy Berry DO at 03/13/22 4294

## 2022-03-14 NOTE — THERAPY EVALUATION
Patient Name: Thelma Michael  : 1960    MRN: [id number]                              Today's Date: 3/14/2022       Admit Date: 3/12/2022    Visit Dx:     ICD-10-CM ICD-9-CM   1. Type 1 diabetes mellitus with ketoacidosis without coma (HCC)  E10.10 250.13   2. Non-intractable vomiting with nausea, unspecified vomiting type  R11.2 787.01     Patient Active Problem List   Diagnosis   • Gastroesophageal reflux disease   • Back pain   • Diabetic peripheral neuropathy (HCC)   • Hyperlipidemia   • Hypertension   • Vitamin D deficiency   • Osteoporosis   • Tobacco use   • Personal history of cardiac murmur   • Pelvic pain   • History of sepsis   • Migraines   • Urinary retention   • Depression with anxiety   • Primary insomnia   • Iron deficiency anemia secondary to inadequate dietary iron intake   • Vertigo   • Acute UTI   • Severe malnutrition (HCC)   • Hypertensive urgency   • Uncontrolled type 1 diabetes mellitus with hyperglycemia (HCC)   • Diabetic ketoacidosis without coma associated with type 1 diabetes mellitus (HCC)   • Non-intractable vomiting   • History of TIAs   • Gastroparesis   • Hypokalemia   • Dehydration   • Nausea vomiting and diarrhea   • Hypokalemia   • Elevated serum creatinine   • Thrombocytosis   • Hypercalcemia   • Elevated serum protein level   • Leukocytosis   • Type 1 diabetes mellitus with ketoacidosis without coma (HCC)   • Bacteriuria     Past Medical History:   Diagnosis Date   • Acid reflux    • Acute bronchitis    • Cardiac murmur    • Diabetes mellitus (HCC)    • H/O echocardiogram 2012    i. LVEF 65%.ii. Mild LVH.iii. Borderline evidence of atrial septal aneurysm.  No PFO.    • History of nuclear stress test 2014    Negative for ischemia and scars; LVEF 77%.     • Hyperlipidemia    • Hypertension    • Impacted cerumen of both ears    • Migraine    • Self-catheterizes urinary bladder    • Sinusitis    • Stroke (HCC)    • Tobacco abuse     quit 4 days ago.     •  Urticaria      Past Surgical History:   Procedure Laterality Date   • CAPSULE ENDOSCOPY  7/27/2021    Procedure: PILLCAM DEPLOYMENT;  Surgeon: Mikael Worthy MD;  Location:  [facility];  Service: Gastroenterology;;   • COLONOSCOPY     • COLONOSCOPY N/A 7/27/2021    Procedure: COLONOSCOPY;  Surgeon: Mikael Worthy MD;  Location:  [facility];  Service: Gastroenterology;  Laterality: N/A;   • DENTAL PROCEDURE     • ENDOSCOPY N/A 6/30/2021    Procedure: ESOPHAGOGASTRODUODENOSCOPY;  Surgeon: Brunner, Mark I, MD;  Location:  [facility];  Service: Gastroenterology;  Laterality: N/A;   • ENDOSCOPY N/A 7/27/2021    Procedure: ESOPHAGOGASTRODUODENOSCOPY;  Surgeon: Mikael Worthy MD;  Location:  [facility];  Service: Gastroenterology;  Laterality: N/A;   • NO PAST SURGERIES        General Information     Row Name 03/14/22 0951          OT Time and Intention    Document Type evaluation  -JY     Mode of Treatment occupational therapy;individual therapy  -JY     Row Name 03/14/22 0951          General Information    Patient Profile Reviewed yes  -JY     Prior Level of Function independent:;all household mobility;gait;transfer;bed mobility;ADL's;feeding;grooming;dressing;bathing;using stairs;min assist:;home management;cooking;cleaning  I in ADLs, some A from son for IADLs d/t convenience vs need for A, reportedly used SPC (primary) or FWW on as needed basis  -JY     Existing Precautions/Restrictions fall  -JY     Barriers to Rehab none identified  -JY     Row Name 03/14/22 0951          Occupational Profile    Environmental Supports and Barriers (Occupational Profile) walk in shower with seat, standard toilet height, DME: SPC and FWW (used PRN)  -JY     Row Name 03/14/22 0951          Living Environment    People in Home child(bronson), adult;other (see comments)  lives with son (currently out of state), have other children closeby  -JY     Row Name 03/14/22 0951          Home Main Entrance     Number of Stairs, Main Entrance other (see comments)  20 steps to enter apartment  -JY     Stair Railings, Main Entrance none  -JY     Row Name 03/14/22 0951          Stairs Within Home, Primary    Stairs, Within Home, Primary 0  -JY     Number of Stairs, Within Home, Primary none  -JY     Stair Railings, Within Home, Primary none  -JY     Row Name 03/14/22 0951          Cognition    Orientation Status (Cognition) oriented x 4  -JY     Row Name 03/14/22 0951          Safety Issues, Functional Mobility    Safety Issues Affecting Function (Mobility) insight into deficits/self-awareness;safety precaution awareness;safety precautions follow-through/compliance  -JY     Impairments Affecting Function (Mobility) balance;endurance/activity tolerance;pain;strength;postural/trunk control;other (see comments)  forward flexion stance in standing (in part d/t n/v feeling and predicted new to use emesis basin)  -JY     Comment, Safety Issues/Impairments (Mobility) alert and able to follow commands; progressive mobility limited by frequent nausea and instances of need to vomit; pt easily fatigued and reports significant exertion with EOB mobility and STS  -JY           User Key  (r) = Recorded By, (t) = Taken By, (c) = Cosigned By    Initials Name Provider Type    Sheila Borja, OT Occupational Therapist                 Mobility/ADL's     Row Name 03/14/22 0977          Bed Mobility    Bed Mobility supine-sit;sit-supine;scooting/bridging  -JKENA     Scooting/Bridging [place] (Bed Mobility) independent  -JY     Supine-Sit [place] (Bed Mobility) contact guard;verbal cues  -JY     Sit-Supine [place] (Bed Mobility) contact guard;verbal cues  -JY     Assistive Device (Bed Mobility) bed rails;head of bed elevated  -JY     Comment, (Bed Mobility) pt req'd gross CGA for lattermost uprighting of trunk when moving to sitting and to elevate LEs up to bed height in return to supine with pt fatigued and nauseous; maintained HOB  elevated throughout to come from grossly sitting position  -[facility] Name 03/14/22 0959          Transfers    Transfers sit-stand transfer;stand-sit transfer  -JY     Comment, (Transfers) skilled cues for optimal hand placement for controlled ascend, descend specifically to reach back prior to sitting and to push up from seated surface vs pulling up on FWW  -JY     Sit-Stand [place] (Transfers) contact guard;verbal cues  -JY     Stand-Sit [place] (Transfers) contact guard;verbal cues  -[place] [employer]     Row Name 03/14/22 0959          Sit-Stand Transfer    Assistive Device (Sit-Stand Transfers) walker, front-wheeled  -[place] Envi[employer]KENA     [facility] Name 03/14/22 0959          Functional Mobility    Functional Mobility- Comment defer to PT for specifics, pt too nauseous and perceived need to vomit to progress mobility, requested to return to bed  -LENA     [facility] Name 03/14/22 0959          Activities of Daily Living    BADL Assessment/Intervention upper body dressing;lower body dressing;grooming  -JY     Row Name 03/14/22 0959          Stand-Sit Transfer    Assistive Device (Stand-Sit Transfers) walker, front-wheeled  -LENA     [facility] Name 03/14/22 0959          Upper Body Dressing Assessment/Training    [place] Level (Upper Body Dressing) doff;don;pajama/robe;minimum assist (75% patient effort);verbal cues  -JY     Position (Upper Body Dressing) unsupported sitting;edge of bed sitting  -JY     Comment, (Upper Body Dressing) min A for proximal and posterior management of gown and situational mgmt around IVs; pt fatigued by task and more nauseous with movement thus resulting in more A  -JY     Row Name 03/14/22 0959          Lower Body Dressing Assessment/Training    [place] Level (Lower Body Dressing) doff;don;socks;moderate assist (50% patient effort);verbal cues  -JY     Position (Lower Body Dressing) unsupported sitting;edge of bed sitting  -JY     Comment, (Lower Body Dressing) pt able to cross LEs and bring feet more proximal for  improved reach distally to d/d, however mod A to complete task d/t fatigue and nausea that resulted  -     Row Name 03/14/22 0959          Grooming Assessment/Training    [place] Level (Grooming) wash face, hands;set up  -     Position (Grooming) sitting up in bed  -           User Key  (r) = Recorded By, (t) = Taken By, (c) = Cosigned By    Initials Name Provider Type    Sheila Borja, OT Occupational Therapist               Obj/Interventions     Row Name 03/14/22 1007          Sensory Assessment (Somatosensory)    Sensory Assessment (Somatosensory) bilateral UE;sensation intact  -     Bilateral UE Sensory Assessment general sensation;light touch awareness;light touch localization;intact  -     Sensory Assessment pt denies any current numbness or tingling and able to recognize LT stimuli as intact and symmetrical at BUEs, does report hx of numbness/tingling at B hands and feet yet not currently experiencing  -[facility] Name 03/14/22 1007          Vision Assessment/Intervention    Visual Impairment/Limitations corrective lenses for reading;corrective lenses full-time;other (see comments)  pt reports having eyewear for both  -     Vision Assessment Comment pt denies any acute changes to vision, denying blurred or incomplete vision; does not have eyewear with her  -     Row Name 03/14/22 1007          Range of Motion Comprehensive    General Range of Motion bilateral upper extremity ROM WFL  -     Comment, General Range of Motion pt presents with BUE AROM WFL however increased time and effort to demonstrate d/t muscle weakness and fatigue  -     Row Name 03/14/22 1007          Strength Comprehensive (MMT)    General Manual Muscle Testing (MMT) Assessment upper extremity strength deficits identified  -     Comment, General Manual Muscle Testing (MMT) Assessment BUE MMS grossly 4/5  -     Row Name 03/14/22 1007          Motor Skills    Motor Skills functional endurance  -     Functional  Endurance pt presents with decreased activity tolerance to any more dynamic sitting or standing tasks, pt reports nausea too however collectively only able to tolerate brief EOB sitting and standing before need to return to supine, SPO2 > 90% throughout  -JY     Row Name 03/14/22 1007          Balance    Balance Assessment sitting static balance;sitting dynamic balance;standing static balance;standing dynamic balance  -JY     Static Sitting Balance independent  -JY     Dynamic Sitting Balance modified independence;other (see comments)  UBD, crossed leg tech for LBD  -JY     Position, Sitting Balance unsupported;sitting edge of bed  -JY     Static Standing Balance contact guard;verbal cues  -JY     Dynamic Standing Balance contact guard;verbal cues  -JY     Position/Device Used, Standing Balance supported;walker, rolling  -JY     Balance Interventions sitting;standing;static;dynamic;sit to stand;supported;occupation based/functional task  -J     Comment, Balance pt did not present with any significant LOB during seated or standing tasks, cues for upright standing posture as pt presented with mild trunk flexion (in part d/t nausea and holding emesis basin); utilized FWW throughout standing  -J           User Key  (r) = Recorded By, (t) = Taken By, (c) = Cosigned By    Initials Name Provider Type    Sheila Borja, KAR Occupational Therapist               Goals/Plan     Row Name 03/14/22 1024          Transfer Goal 1 (OT)    Activity/Assistive Device (Transfer Goal 1, OT) sit-to-stand/stand-to-sit;bed-to-chair/chair-to-bed;commode  -JY     [place] Level/Cues Needed (Transfer Goal 1, OT) supervision required;verbal cues required  -J     Time Frame (Transfer Goal 1, OT) long term goal (LTG);by discharge  -J     Progress/Outcome (Transfer Goal 1, OT) goal ongoing  -J     Row Name 03/14/22 1024          Dressing Goal 1 (OT)    Activity/Device (Dressing Goal 1, OT) upper body dressing;lower body dressing;other  (see comments)  d/d TB garments with AE PRN  -JY     [place]/Cues Needed (Dressing Goal 1, OT) contact guard required;verbal cues required  -JY     Time Frame (Dressing Goal 1, OT) long term goal (LTG);by discharge  -JY     Progress/Outcome (Dressing Goal 1, OT) goal ongoing  -     Row Name 03/14/22 1024          Toileting Goal 1 (OT)    Activity/Device (Toileting Goal 1, OT) adjust/manage clothing;perform perineal hygiene;commode;grab bar/safety frame  -JY     [place] Level/Cues Needed (Toileting Goal 1, OT) contact guard required;verbal cues required  -JY     Time Frame (Toileting Goal 1, OT) long term goal (LTG);by discharge  -JY     Progress/Outcome (Toileting Goal 1, OT) goal ongoing  -     Row Name 03/14/22 1024          Strength Goal 1 (OT)    Strength Goal 1 (OT) Pt to complete seated HEP encompassing BUEs focused on strength and endurance w/ progressive reps/sets/resistance in order to improve integration in ADLs, related t/fs  -JY     Time Frame (Strength Goal 1, OT) long term goal (LTG);by discharge  -JY     Progress/Outcome (Strength Goal 1, OT) goal ongoing  -     Row Name 03/14/22 1024          Therapy Assessment/Plan (OT)    Planned Therapy Interventions (OT) activity tolerance training;adaptive equipment training;BADL retraining;functional balance retraining;occupation/activity based interventions;patient/caregiver education/training;strengthening exercise;transfer/mobility retraining  -JY           User Key  (r) = Recorded By, (t) = Taken By, (c) = Cosigned By    Initials Name Provider Type    Sheila Borja, OT Occupational Therapist               Clinical Impression     Row Name 03/14/22 1013          Pain Assessment    Pretreatment Pain Rating 3/10  -JY     Posttreatment Pain Rating 3/10  -JY     Pain Location - Side/Orientation Bilateral  -JY     Pain Location - abdomen  -JY     Pre/Posttreatment Pain Comment pt reports pain at stomach throughout session, RN aware and managing   -JY     Pain Intervention(s) Repositioned;Ambulation/increased activity;Rest  -JY     Row Name 03/14/22 1013          Plan of Care Review    Plan of Care Reviewed With patient  -LENA     Progress no change  OT IE  -JY     Outcome Evaluation OT eval complete. Pt A & O x 4, reports pain at stomach, persistently 3/10. Pt additionally reports nausea and need to vomit this AM and during this session. Pt presents with decreased I in ADLs, related t/fs compared to PLOF limited by decreased fxl endurance, impaired balance, muscle weakness at BUEs, aforementioned n/v. Pt completed supine < > sitting w/ CGA, STS at EOB and lateral stepping toward HOB with CGA and FWW. Pt became more nauseous with EOB mobility and standing and requested return to supine. Pt able to d/d gown with min A, req'd mod A for d/d socks and SUA for hand/face washing. Pt reported perceived increased exertion with all fxl tasks. Recommend IPOT POC and based on performance this date would recommend IRF to address underlying impairments impacting function given pt I at PLOF, has ~ 20 steps to enter home and variable support system at home.  -JY     Row Name 03/14/22 1013          Therapy Assessment/Plan (OT)    Patient/Family Therapy Goal Statement (OT) to increase I in ADLs, related t/fs, return to PLOF  -JY     Rehab Potential (OT) good, to achieve stated therapy goals  -JY     Criteria for Skilled Therapeutic Interventions Met (OT) yes;skilled treatment is necessary  -JY     Therapy Frequency (OT) daily  -JY     Row Name 03/14/22 1013          Therapy Plan Review/Discharge Plan (OT)    Anticipated Discharge Disposition (OT) inpatient rehabilitation facility  -JY     Row Name 03/14/22 1013          Vital Signs    Pre Systolic BP Rehab 125  -JY     Pre Treatment Diastolic BP 57  -JY     Pretreatment Heart Rate (beats/min) 84  -JY     Posttreatment Heart Rate (beats/min) 86  -JY     Pre SpO2 (%) 98  -JY     O2 Delivery Pre Treatment room air  -JY     O2  Delivery Intra Treatment room air  -JY     Post SpO2 (%) 98  -JY     O2 Delivery Post Treatment room air  -JY     Pre Patient Position Supine  -JY     Intra Patient Position Standing  -JY     Post Patient Position Supine  -JY     Row Name 03/14/22 1013          Positioning and Restraints    Pre-Treatment Position in bed  -JY     Post Treatment Position bed  -JY     In Bed notified nsg;supine;call light within reach;encouraged to call for assist;exit alarm on;side rails up x2  -JY           User Key  (r) = Recorded By, (t) = Taken By, (c) = Cosigned By    Initials Name Provider Type    Sheila Borja OT Occupational Therapist               Outcome Measures     Row Name 03/14/22 1027          How much help from another is currently needed...    Putting on and taking off regular lower body clothing? 2  -JY     Bathing (including washing, rinsing, and drying) 2  -JY     Toileting (which includes using toilet bed pan or urinal) 2  -JY     Putting on and taking off regular upper body clothing 3  -JY     Taking care of personal grooming (such as brushing teeth) 3  -JY     Eating meals 4  -JY     AM-PAC 6 Clicks Score (OT) 16  -JY     Row Name 03/14/22 1027          Functional Assessment    Outcome Measure Options AM-PAC 6 Clicks Daily Activity (OT)  -JY           User Key  (r) = Recorded By, (t) = Taken By, (c) = Cosigned By    Initials Name Provider Type    Sheila Borja OT Occupational Therapist                Occupational Therapy Education                 Title: PT OT SLP Therapies (In Progress)     Topic: Occupational Therapy (In Progress)     Point: ADL training (In Progress)     Description:   Instruct learner(s) on proper safety adaptation and remediation techniques during self care or transfers.   Instruct in proper use of assistive devices.              Learning Progress Summary           Patient Acceptance, E,D, NR by LENA at 3/14/2022 7287                   Point: Home exercise program (Not Started)      Description:   Instruct learner(s) on appropriate technique for monitoring, assisting and/or progressing therapeutic exercises/activities.              Learner Progress:  Not documented in this visit.          Point: Precautions (In Progress)     Description:   Instruct learner(s) on prescribed precautions during self-care and functional transfers.              Learning Progress Summary           Patient Acceptance, E,D, NR by LENA at 3/14/2022 0749                   Point: Body mechanics (In Progress)     Description:   Instruct learner(s) on proper positioning and spine alignment during self-care, functional mobility activities and/or exercises.              Learning Progress Summary           Patient Acceptance, E,D, NR by LENA at 3/14/2022 0749                               User Key     Initials Effective Dates Name Provider Type Discipline    LENA 06/16/21 -  Sheila Tabares, KAR Occupational Therapist OT              OT Recommendation and Plan  Planned Therapy Interventions (OT): activity tolerance training, adaptive equipment training, BADL retraining, functional balance retraining, occupation/activity based interventions, patient/caregiver education/training, strengthening exercise, transfer/mobility retraining  Therapy Frequency (OT): daily  Plan of Care Review  Plan of Care Reviewed With: patient  Progress: no change (OT IE)  Outcome Evaluation: OT eval complete. Pt A & O x 4, reports pain at stomach, persistently 3/10. Pt additionally reports nausea and need to vomit this AM and during this session. Pt presents with decreased I in ADLs, related t/fs compared to PLOF limited by decreased fxl endurance, impaired balance, muscle weakness at BUEs, aforementioned n/v. Pt completed supine < > sitting w/ CGA, STS at EOB and lateral stepping toward HOB with CGA and FWW. Pt became more nauseous with EOB mobility and standing and requested return to supine. Pt able to d/d gown with min A, req'd mod A for d/d socks and SUA  for hand/face washing. Pt reported perceived increased exertion with all fxl tasks. Recommend IPOT POC and based on performance this date would recommend IRF to address underlying impairments impacting function given pt I at [facility], has ~ 20 steps to enter home and variable support system at home.     Time Calculation:    Time Calculation- OT     Row Name 03/14/22 1028             Time Calculation- OT    OT Start Time 0749  -JY      OT Received On 03/14/22  -JY      OT Goal Re-Cert Due Date 03/24/22  -JY              Timed Charges    99585 - OT Therapeutic Activity Minutes 10  -JY              Untimed Charges    OT Eval/Re-eval Minutes 48  -JY              Total Minutes    Timed Charges Total Minutes 10  -JY      Untimed Charges Total Minutes 48  -JY       Total Minutes 58  -JY            User Key  (r) = Recorded By, (t) = Taken By, (c) = Cosigned By    Initials Name Provider Type    Sheila Borja OT Occupational Therapist              Therapy Charges for Today     Code Description Service Date Service Provider Modifiers Qty    [id number]  OT THERAPEUTIC ACT EA 15 MIN 3/14/2022 Sheila Tabares OT GO 1    [id number] HC OT EVAL LOW COMPLEXITY 4 3/14/2022 Sheila Tabares OT GO 1               Sheila Tabares OT  3/14/2022

## 2022-03-14 NOTE — PROGRESS NOTES
[facility]  PROGRESS NOTE    Patient Name: Thelma Michael  : 1960  MRN: [id number]    Date of Admission: 3/12/2022  Primary Care Physician: Monet Howe APRN    Subjective   Subjective     CC:  nausea    HPI:  No acute events.  Has remained nauseated.  States this feels typical for her gastroparesis.  No additional diarrhea.  Abdominal pain is improved and feels like it is because of vomiting.    ROS:  Gen- No fevers, chills  CV- No chest pain, palpitations  Resp- No cough, dyspnea  GI- + N/v     Objective   Objective     Vital Signs:   Temp:  [98.9 °F (37.2 °C)-100.7 °F (38.2 °C)] 99.7 °F (37.6 °C)  Heart Rate:  [] 86  Resp:  [14-18] 14  BP: (139-201)/(58-91) 168/76     Physical Exam:  Constitutional: No acute distress, awake, alert; frail   HENT: NCAT, mucous membranes dry  Respiratory: Clear to auscultation bilaterally, respiratory effort normal   Cardiovascular: RRR, no murmurs, rubs, or gallops  Gastrointestinal: Positive bowel sounds, soft, nontender, nondistended  Musculoskeletal: No bilateral ankle edema  Psychiatric: Appropriate affect, cooperative  Neurologic: Oriented x 3, strength symmetric in all extremities, Cranial Nerves grossly intact to confrontation, speech clear  Skin: No rashes    Results Reviewed:  LAB RESULTS:      Lab 22  0343 22  0558 22  0101 22  0058   WBC 8.20 12.31*  --  8.65   HEMOGLOBIN 10.5* 11.7*  --  13.3   HEMATOCRIT 31.9* 34.2  --  38.4   PLATELETS 335 433  --  519*   NEUTROS ABS 5.75 11.64*  --  7.35*   IMMATURE GRANS (ABS) 0.03 0.04  --  0.04   LYMPHS ABS 1.80 0.49*  --  1.00   MONOS ABS 0.61 0.12  --  0.23   EOS ABS 0.00 0.00  --  0.01   MCV 91.9 88.4  --  86.7   PROCALCITONIN  --   --  0.07  --    LACTATE  --  3.3*  --  3.5*         Lab 22  0343 22  2354 22  2211 22  1551 22  1011 22  0558 22  0131 22  0101 22  0058   0000    SODIUM 138 137 139 142 140 140  --  141  --    < >   POTASSIUM 3.7 3.4* 3.6 3.3* 3.3* 2.9*  --  3.5  --    < >   CHLORIDE 106 104 106 109* 105 102  --  100  --    < >   CO2 21.0* 18.0* 19.0* 21.0* 19.0* 19.0*  --  18.0*  --    < >   ANION GAP 11.0 15.0 14.0 12.0 16.0* 19.0*  --  23.0*  --    < >   BUN 7* 7* 8 11 14 13  --  16  --    < >   CREATININE 0.77 0.74 0.77 0.78 0.88 0.90   < > 1.07*  --    < >   EGFR 86.8 91.0 86.8 85.5 73.9 72.0  --  58.5*  --    < >   GLUCOSE 217* 197* 139* 189* 389* 342*  --  306*  --    < >   CALCIUM 8.8 8.7 9.0 8.6 8.5* 9.0  --  10.0  --    < >   MAGNESIUM 2.8* 1.4*  --  1.6 1.5* 1.6  --  1.8  --    < >   PHOSPHORUS 3.0 2.8  --  1.9* 3.8  --   --  2.5  --   --    HEMOGLOBIN A1C  --   --   --   --   --   --   --   --  8.40*  --     < > = values in this interval not displayed.         Lab 03/13/22  0558 03/13/22 0101   TOTAL PROTEIN 7.1 7.8   ALBUMIN 4.20 4.50   GLOBULIN 2.9 3.3   ALT (SGPT) 16 17   AST (SGOT) 30 17   BILIRUBIN 0.6 0.7   ALK PHOS 84 91   LIPASE  --  25         Lab 03/13/22  0101   TROPONIN T 0.012                 Lab 03/13/22  0516   PH, ARTERIAL 7.489*   PCO2, ARTERIAL 26.6*   PO2 ART 99.9   FIO2 21   HCO3 ART 20.2   BASE EXCESS ART -1.9*   CARBOXYHEMOGLOBIN 0.7     Brief Urine Lab Results  (Last result in the past 365 days)      Color   Clarity   Blood   Leuk Est   Nitrite   Protein   CREAT   Urine HCG        03/13/22 0121 Yellow   Cloudy   Trace   Negative   Negative   >=300 mg/dL (3+)                 Microbiology Results Abnormal     Procedure Component Value - Date/Time    Blood Culture - Blood, Arm, Right [[id number]]  (Normal) Collected: 03/13/22 0600    Lab Status: Preliminary result Specimen: Blood from Arm, Right Updated: 03/14/22 0647     Blood Culture No growth at 24 hours    Blood Culture - Blood, Arm, Left [[id number]]  (Normal) Collected: 03/13/22 0540    Lab Status: Preliminary result Specimen: Blood from Arm, Left Updated: 03/14/22 0647     Blood Culture  No growth at 24 hours    Respiratory Panel PCR w/COVID-19(SARS-CoV-2) NORMA/JUAN ALBERTO/EDY/PAD/COR/MAD/ROSALINA In-House, NP Swab in UTM/VTM, 3-4 HR TAT - Swab, Nasopharynx [[id number]]  (Normal) Collected: 03/13/22 0639    Lab Status: Final result Specimen: Swab from Nasopharynx Updated: 03/13/22 0739     ADENOVIRUS, PCR Not Detected     Coronavirus 229E Not Detected     Coronavirus HKU1 Not Detected     Coronavirus NL63 Not Detected     Coronavirus OC43 Not Detected     COVID19 Not Detected     Human Metapneumovirus Not Detected     Human Rhinovirus/Enterovirus Not Detected     Influenza A PCR Not Detected     Influenza B PCR Not Detected     Parainfluenza Virus 1 Not Detected     Parainfluenza Virus 2 Not Detected     Parainfluenza Virus 3 Not Detected     Parainfluenza Virus 4 Not Detected     RSV, PCR Not Detected     Bordetella pertussis pcr Not Detected     Bordetella parapertussis PCR Not Detected     Chlamydophila pneumoniae PCR Not Detected     Mycoplasma pneumo by PCR Not Detected    Narrative:      In the setting of a positive respiratory panel with a viral infection PLUS a negative procalcitonin without other underlying concern for bacterial infection, consider observing off antibiotics or discontinuation of antibiotics and continue supportive care. If the respiratory panel is positive for atypical bacterial infection (Bordetella pertussis, Chlamydophila pneumoniae, or Mycoplasma pneumoniae), consider antibiotic de-escalation to target atypical bacterial infection.          CT Angiogram Abdomen Pelvis    Result Date: 3/13/2022  EXAMINATION: CTA ABDOMEN AND PELVIS WITH IV CONTRAST   DATE OF EXAMINATION: 3/13/2022. COMPARISON: 2/11/2022. INDICATION: Severe abdominal pain with nausea, vomiting and dark emesis. Jaundice. PROCEDURE:  Axial CT of the abdomen and pelvis was performed without and with contrast and sagittal and coronal reformatted images were performed.  75 mL of Isovue-370 was given intravenously. CT dose  lowering techniques were used, to include: automated exposure control, adjustment for patient size, and/or use of iterative reconstruction. Maximum intensity projection 3-D reformats were performed. FINDINGS: LOWER CHEST :  The visualized lung bases are clear.  There are no pleural or pericardial effusions. ABDOMEN: Liver and Biliary system:  Normal. Adrenal glands:  Bilateral adrenal nodules appear unchanged most likely adenomas.. Kidneys and ureters: Subcentimeter hypodensities are seen within the left kidney that are too small fully characterize. The kidneys otherwise appear unremarkable. Spleen:  Normal. Pancreas:  Normal. Gallbladder:  Normal. Lymph nodes, Peritoneum and mesentery:  There is no mesenteric or retroperitoneal lymphadenopathy. Gastrointestinal tract:  There are no dilated loops of bowel or free intraperitoneal air.    The appendix is normal. There is mild sigmoid diverticulosis without evidence of diverticulitis. Aorta/IVC:   There is moderate vascular calcification throughout the abdominal aorta without evidence of aneurysmal dilation or dissection.  IVC normal. Abdominal wall:  Normal. PELVIS: Fluid: There is no free fluid in the pelvis. Lymph Nodes:  There is no pelvic or inguinal lymphadenopathy.. Urinary bladder:  Lobular contour to the liver is unchanged.. BONES:  There are no osseous destructive lesions.. ADDITIONAL  SIGNIFICANT FINDINGS:  None.     Impression: 1. No acute process seen within the abdomen or pelvis. 2. Diverticulosis without evidence of diverticulitis. Electronically signed by:  Jimmie Luna D.O.  3/13/2022 12:15 AM Mountain Time    XR Chest 1 View    Result Date: 3/13/2022  XR CHEST 1 VW-  Date of Exam: 3/13/2022 2:54 PM  Indication: DKA; E10.10-Type 1 diabetes mellitus with ketoacidosis without coma; R11.2-Nausea with vomiting, unspecified.  Comparison:?02/10/2022  Technique:?A single view of the chest was obtained.  FINDINGS:  ?Heart size and pulmonary vessels are  within normal limits.  Lungs are clear bilaterally.  No pleural effusion or pneumothorax.  Bony structures are unremarkable.        Impression:  1. No acute cardiopulmonary disease.   This report was finalized on 3/13/2022 3:17 PM by Jimmie Alfaro MD.        Results for orders placed during the hospital encounter of 11/19/19    Adult Transesophageal Echo (LIZANDRO) W/ Cont if Necessary Per Protocol    Interpretation Summary  · Left ventricular systolic function is normal. Estimated EF = 65%.  · Left ventricular wall thickness is consistent with hypertrophy.  · Small patent foramen ovale present. Saline test results are positive with valsalva manuever.  · There is mild mitral valve prolapse of the anterior mitral leaflet.  · Mild tricuspid valve regurgitation is present.  · Estimated right ventricular systolic pressure from tricuspid regurgitation is mildly elevated (35-45 mmHg).  · There is mild plaque in the descending aorta present.      I have reviewed the medications:  Scheduled Meds:Pharmacy Consult, , Does not apply, BID  amLODIPine, 10 mg, Oral, Daily  ascorbic acid, 500 mg, Oral, Daily  aspirin, 81 mg, Oral, Daily  atorvastatin, 80 mg, Oral, Nightly  cefTRIAXone, 1 g, Intravenous, Q24H  cholecalciferol, 1,000 Units, Oral, Daily  donepezil, 5 mg, Oral, Nightly  ferrous sulfate, 325 mg, Oral, Daily With Breakfast  FLUoxetine, 20 mg, Oral, Daily  multivitamin with minerals, 1 tablet, Oral, Daily  sodium chloride, 10 mL, Intravenous, Q12H  sodium chloride, 10 mL, Intravenous, Q12H  sodium chloride, 10 mL, Intravenous, Q12H  terazosin, 1 mg, Oral, Nightly      Continuous Infusions:dextrose 5 % and sodium chloride 0.45 %, 150 mL/hr  dextrose 5 % and sodium chloride 0.45 % with KCl 20 mEq/L, 150 mL/hr, Last Rate: 150 mL/hr (03/14/22 0412)  dextrose 5 % and sodium chloride 0.45 % with KCl 40 mEq/L, 150 mL/hr  dextrose 5 % and sodium chloride 0.9 %, 150 mL/hr  dextrose 5 % and sodium chloride 0.9 % with KCl 20 mEq, 150  mL/hr  dextrose 5% and sodium chloride 0.9% with KCl 40 mEq/L, 150 mL/hr  insulin, 5 Units/hr, Last Rate: 0.5 Units/hr (03/14/22 0500)  niCARdipine, 5-15 mg/hr, Last Rate: Stopped (03/13/22 1340)  sodium chloride, 10 mL/hr      PRN Meds:.acetaminophen **OR** acetaminophen **OR** acetaminophen  •  albuterol  •  calcium carbonate  •  dextrose  •  dextrose 5 % and sodium chloride 0.45 %  •  dextrose 5 % and sodium chloride 0.45 % with KCl 20 mEq/L  •  dextrose 5 % and sodium chloride 0.45 % with KCl 40 mEq/L  •  dextrose 5 % and sodium chloride 0.9 %  •  dextrose 5 % and sodium chloride 0.9 % with KCl 20 mEq  •  dextrose 5% and sodium chloride 0.9% with KCl 40 mEq/L  •  gabapentin  •  hydrALAZINE  •  LORazepam  •  magnesium sulfate **OR** magnesium sulfate **OR** magnesium sulfate  •  melatonin  •  potassium chloride **OR** potassium chloride **OR** potassium chloride  •  potassium phosphate infusion greater than 15 mMoles **OR** potassium phosphate infusion greater than 15 mMoles **OR** potassium phosphate **OR** sodium phosphate IVPB **OR** sodium phosphate IVPB **OR** sodium phosphate IVPB  •  Sodium Chloride (PF)  •  sodium chloride  •  traMADol    Assessment/Plan   Assessment & Plan     Active Hospital Problems    Diagnosis  POA   • Type 1 diabetes mellitus with ketoacidosis without coma (HCC) [E10.10]  Yes   • Bacteriuria [R82.71]  Yes   • Gastroparesis [K31.84]  Yes   • Diabetic ketoacidosis without coma associated with type 1 diabetes mellitus (HCC) [E10.10]  Yes   • Hypertensive urgency [I16.0]  Yes   • Gastroesophageal reflux disease [K21.9]  Yes   • Hyperlipidemia [E78.5]  Yes      Resolved Hospital Problems   No resolved problems to display.        Brief Hospital Course to date:  61 year old female presents to the ED with with Nausea/vomiting/diarrhea and abdominal pain that began yesterday.      DKA  Type 1 Diabetes   -anion gap: 23.0, glucose 316, ABG noted above, UA: glucose and ketones  present  -betahydroxy elevated  - Continue insulin gtt   - resume diet once tolerated   -hgb A1c 8.40  - Gap close; will transition to subq once able to tolerate PO      Hypokalemia   - continue to replace      Hypertensive urgency   - /107  - Trial home meds, norvasc and cardura again -- didn't tolerate due to nausea yesterday   - start hydralazine 10 mg TID to wean off cardene      Bacteriuria   - UA noted above   - Continue rocephin   - urine cultures pending; BCx pending     Fever   - UTI being treated; BCx pending   - CXR with no acute process   - Monitor for additional diarrhea    Diarrhea   - No recent antibiotic use   -monitor electrolytes  -CT A/P no acute process      Gastroparesis   - follows with GI  - getting referral to [facility] gastrointestinal motility clinic   - resume reglan      GERD  -PPI     Prolonged QT   - improved; limit contributing medications      DVT prophylaxis:  Mechanical DVT prophylaxis orders are present.       AM-PAC 6 Clicks Score (PT): 18 (03/13/22 0814)    Disposition: I expect the patient to be discharged TBD    CODE STATUS:   Code Status and Medical Interventions:   Ordered at: 03/13/22 0617     Level Of Support Discussed With:    Patient     Code Status (Patient has no pulse and is not breathing):    CPR (Attempt to Resuscitate)     Medical Interventions (Patient has pulse or is breathing):    Full Support       Ivy Berry DO  03/14/22

## 2022-03-14 NOTE — CASE MANAGEMENT/SOCIAL WORK
Discharge Planning Assessment  [facility]     Patient Name: Thelma Michael  MRN: [id number]  Today's Date: 3/14/2022    Admit Date: 3/12/2022     Discharge Needs Assessment     Row Name 03/14/22 1521       Living Environment    People in Home child(bronson), adult    Name(s) of People in Home Keshav    Current Living Arrangements apartment    Primary Care Provided by self    Able to Return to Prior Arrangements yes       Transition Planning    Patient/Family Anticipates Transition to home    Transportation Anticipated family or friend will provide       Discharge Needs Assessment    Readmission Within the Last 30 Days no previous admission in last 30 days    Equipment Currently Used at Home cane, straight;walker, rolling;glucometer    Concerns to be Addressed discharge planning    Current Discharge Risk chronically ill               Discharge Plan     Row Name 03/14/22 1529       Plan    Plan Comments I met with Mrs. Michael at the bedside. She lives in [facility] with her adult son. At home, she ambulates within their apartment independently. She does have a cane and rolling walker she uses at time. She is independent with activities of daily living. She manages her own medications. She is admitted for DKA and nausea. She hopes to return home at the time of discharge. Case management will follow her plan of care and assist with planning.    Final Discharge Disposition Code 30 - still a patient              Continued Care and Services - Admitted Since 3/12/2022    Coordination has not been started for this encounter.          Demographic Summary     Row Name 03/14/22 1527       General Information    General Information Comments I confirmed that Monalisa Clark is Mrs. Michael's PCP. [employer] is her insurer.               Functional Status     Row Name 03/14/22 1521       Functional Status, IADL    Medications independent    Meal Preparation independent               Psychosocial    No documentation.                 Abuse/Neglect    No documentation.                Legal    No documentation.                Substance Abuse    No documentation.                Patient Forms    No documentation.                   Christian Manzo RN

## 2022-03-14 NOTE — PLAN OF CARE
Goal Outcome Evaluation:  Plan of Care Reviewed With: patient        Progress: improving  Outcome Evaluation: A & O x 4, NSR, RA, c/o generalized abdominal pain, n/v - see MAR, K+ and phos replaced, currently receiving D5 1/2 NS with 20K @ 150ml/hr and Ins gtt at 2.5units/hr, Cardene gtt d/c'd - told to use PRN Hydralazine first before restarting, remains NPO, Purewick in place, fingersticks now q2 hrs per protocol, Fernando 19, Falls score 11/bed alarm active, will continue to monitor

## 2022-03-14 NOTE — PLAN OF CARE
Problem: Adult Inpatient Plan of Care  Goal: Plan of Care Review  Recent Flowsheet Documentation  Taken 3/14/2022 1013 by Sheila Tabares OT  Progress: (OT IE) no change  Plan of Care Reviewed With: patient  Outcome Evaluation: KAR noel complete. Pt A & O x 4, reports pain at stomach, persistently 3/10. Pt additionally reports nausea and need to vomit this AM and during this session. Pt presents with decreased I in ADLs, related t/fs compared to PLOF limited by decreased fxl endurance, impaired balance, muscle weakness at BUEs, aforementioned n/v. Pt completed supine < > sitting w/ CGA, STS at EOB and lateral stepping toward HOB with CGA and FWW. Pt became more nauseous with EOB mobility and standing and requested return to supine. Pt able to d/d gown with min A, req'd mod A for d/d socks and SUA for hand/face washing. Pt reported perceived increased exertion with all fxl tasks. Recommend IPOT POC and based on performance this date would recommend IRF to address underlying impairments impacting function given pt I at PLOF, has ~ 20 steps to enter home and variable support system at home.

## 2022-03-15 ENCOUNTER — APPOINTMENT (OUTPATIENT)
Dept: GENERAL RADIOLOGY | Facility: HOSPITAL | Age: 62
End: 2022-03-15

## 2022-03-15 ENCOUNTER — ANESTHESIA EVENT (OUTPATIENT)
Dept: GASTROENTEROLOGY | Facility: HOSPITAL | Age: 62
End: 2022-03-15

## 2022-03-15 LAB
ANION GAP SERPL CALCULATED.3IONS-SCNC: 12 MMOL/L (ref 5–15)
ANION GAP SERPL CALCULATED.3IONS-SCNC: 13 MMOL/L (ref 5–15)
ANION GAP SERPL CALCULATED.3IONS-SCNC: 13 MMOL/L (ref 5–15)
ANION GAP SERPL CALCULATED.3IONS-SCNC: 14 MMOL/L (ref 5–15)
BASOPHILS # BLD AUTO: 0.02 10*3/MM3 (ref 0–0.2)
BASOPHILS NFR BLD AUTO: 0.3 % (ref 0–1.5)
BUN SERPL-MCNC: 3 MG/DL (ref 8–23)
BUN SERPL-MCNC: 3 MG/DL (ref 8–23)
BUN SERPL-MCNC: 4 MG/DL (ref 8–23)
BUN SERPL-MCNC: 4 MG/DL (ref 8–23)
BUN/CREAT SERPL: 3.9 (ref 7–25)
BUN/CREAT SERPL: 4.1 (ref 7–25)
BUN/CREAT SERPL: 4.9 (ref 7–25)
BUN/CREAT SERPL: 5.6 (ref 7–25)
CALCIUM SPEC-SCNC: 8.5 MG/DL (ref 8.6–10.5)
CALCIUM SPEC-SCNC: 8.6 MG/DL (ref 8.6–10.5)
CHLORIDE SERPL-SCNC: 104 MMOL/L (ref 98–107)
CHLORIDE SERPL-SCNC: 106 MMOL/L (ref 98–107)
CHLORIDE SERPL-SCNC: 107 MMOL/L (ref 98–107)
CHLORIDE SERPL-SCNC: 107 MMOL/L (ref 98–107)
CO2 SERPL-SCNC: 16 MMOL/L (ref 22–29)
CO2 SERPL-SCNC: 17 MMOL/L (ref 22–29)
CO2 SERPL-SCNC: 17 MMOL/L (ref 22–29)
CO2 SERPL-SCNC: 18 MMOL/L (ref 22–29)
CREAT SERPL-MCNC: 0.72 MG/DL (ref 0.57–1)
CREAT SERPL-MCNC: 0.74 MG/DL (ref 0.57–1)
CREAT SERPL-MCNC: 0.76 MG/DL (ref 0.57–1)
CREAT SERPL-MCNC: 0.81 MG/DL (ref 0.57–1)
DEPRECATED RDW RBC AUTO: 41.7 FL (ref 37–54)
EGFRCR SERPLBLD CKD-EPI 2021: 81.7 ML/MIN/1.73
EGFRCR SERPLBLD CKD-EPI 2021: 88.2 ML/MIN/1.73
EGFRCR SERPLBLD CKD-EPI 2021: 91 ML/MIN/1.73
EGFRCR SERPLBLD CKD-EPI 2021: 94.1 ML/MIN/1.73
EOSINOPHIL # BLD AUTO: 0.02 10*3/MM3 (ref 0–0.4)
EOSINOPHIL NFR BLD AUTO: 0.3 % (ref 0.3–6.2)
ERYTHROCYTE [DISTWIDTH] IN BLOOD BY AUTOMATED COUNT: 12.9 % (ref 12.3–15.4)
GLUCOSE BLDC GLUCOMTR-MCNC: 104 MG/DL (ref 70–130)
GLUCOSE BLDC GLUCOMTR-MCNC: 167 MG/DL (ref 70–130)
GLUCOSE BLDC GLUCOMTR-MCNC: 168 MG/DL (ref 70–130)
GLUCOSE BLDC GLUCOMTR-MCNC: 173 MG/DL (ref 70–130)
GLUCOSE BLDC GLUCOMTR-MCNC: 177 MG/DL (ref 70–130)
GLUCOSE BLDC GLUCOMTR-MCNC: 178 MG/DL (ref 70–130)
GLUCOSE BLDC GLUCOMTR-MCNC: 180 MG/DL (ref 70–130)
GLUCOSE BLDC GLUCOMTR-MCNC: 181 MG/DL (ref 70–130)
GLUCOSE BLDC GLUCOMTR-MCNC: 190 MG/DL (ref 70–130)
GLUCOSE BLDC GLUCOMTR-MCNC: 190 MG/DL (ref 70–130)
GLUCOSE BLDC GLUCOMTR-MCNC: 195 MG/DL (ref 70–130)
GLUCOSE BLDC GLUCOMTR-MCNC: 202 MG/DL (ref 70–130)
GLUCOSE BLDC GLUCOMTR-MCNC: 206 MG/DL (ref 70–130)
GLUCOSE BLDC GLUCOMTR-MCNC: 207 MG/DL (ref 70–130)
GLUCOSE BLDC GLUCOMTR-MCNC: 209 MG/DL (ref 70–130)
GLUCOSE BLDC GLUCOMTR-MCNC: 217 MG/DL (ref 70–130)
GLUCOSE BLDC GLUCOMTR-MCNC: 234 MG/DL (ref 70–130)
GLUCOSE BLDC GLUCOMTR-MCNC: 245 MG/DL (ref 70–130)
GLUCOSE BLDC GLUCOMTR-MCNC: 247 MG/DL (ref 70–130)
GLUCOSE BLDC GLUCOMTR-MCNC: 99 MG/DL (ref 70–130)
GLUCOSE SERPL-MCNC: 160 MG/DL (ref 65–99)
GLUCOSE SERPL-MCNC: 163 MG/DL (ref 65–99)
GLUCOSE SERPL-MCNC: 195 MG/DL (ref 65–99)
GLUCOSE SERPL-MCNC: 201 MG/DL (ref 65–99)
HCT VFR BLD AUTO: 31.3 % (ref 34–46.6)
HGB BLD-MCNC: 10.6 G/DL (ref 12–15.9)
IMM GRANULOCYTES # BLD AUTO: 0.04 10*3/MM3 (ref 0–0.05)
IMM GRANULOCYTES NFR BLD AUTO: 0.5 % (ref 0–0.5)
LYMPHOCYTES # BLD AUTO: 1.6 10*3/MM3 (ref 0.7–3.1)
LYMPHOCYTES NFR BLD AUTO: 20.4 % (ref 19.6–45.3)
MAGNESIUM SERPL-MCNC: 1.8 MG/DL (ref 1.6–2.4)
MAGNESIUM SERPL-MCNC: 1.8 MG/DL (ref 1.6–2.4)
MAGNESIUM SERPL-MCNC: 1.9 MG/DL (ref 1.6–2.4)
MAGNESIUM SERPL-MCNC: 1.9 MG/DL (ref 1.6–2.4)
MCH RBC QN AUTO: 29.6 PG (ref 26.6–33)
MCHC RBC AUTO-ENTMCNC: 33.9 G/DL (ref 31.5–35.7)
MCV RBC AUTO: 87.4 FL (ref 79–97)
MONOCYTES # BLD AUTO: 0.57 10*3/MM3 (ref 0.1–0.9)
MONOCYTES NFR BLD AUTO: 7.3 % (ref 5–12)
NEUTROPHILS NFR BLD AUTO: 5.58 10*3/MM3 (ref 1.7–7)
NEUTROPHILS NFR BLD AUTO: 71.2 % (ref 42.7–76)
NRBC BLD AUTO-RTO: 0 /100 WBC (ref 0–0.2)
PHOSPHATE SERPL-MCNC: 2.5 MG/DL (ref 2.5–4.5)
PHOSPHATE SERPL-MCNC: 2.7 MG/DL (ref 2.5–4.5)
PHOSPHATE SERPL-MCNC: 2.8 MG/DL (ref 2.5–4.5)
PHOSPHATE SERPL-MCNC: 3.4 MG/DL (ref 2.5–4.5)
PLATELET # BLD AUTO: 362 10*3/MM3 (ref 140–450)
PMV BLD AUTO: 10.1 FL (ref 6–12)
POTASSIUM SERPL-SCNC: 3.5 MMOL/L (ref 3.5–5.2)
POTASSIUM SERPL-SCNC: 3.7 MMOL/L (ref 3.5–5.2)
POTASSIUM SERPL-SCNC: 3.7 MMOL/L (ref 3.5–5.2)
POTASSIUM SERPL-SCNC: 4.4 MMOL/L (ref 3.5–5.2)
QT INTERVAL: 390 MS
QT INTERVAL: 476 MS
QTC INTERVAL: 474 MS
QTC INTERVAL: 514 MS
RBC # BLD AUTO: 3.58 10*6/MM3 (ref 3.77–5.28)
SODIUM SERPL-SCNC: 134 MMOL/L (ref 136–145)
SODIUM SERPL-SCNC: 135 MMOL/L (ref 136–145)
SODIUM SERPL-SCNC: 136 MMOL/L (ref 136–145)
SODIUM SERPL-SCNC: 139 MMOL/L (ref 136–145)
WBC NRBC COR # BLD: 7.83 10*3/MM3 (ref 3.4–10.8)

## 2022-03-15 PROCEDURE — 83735 ASSAY OF MAGNESIUM: CPT | Performed by: NURSE PRACTITIONER

## 2022-03-15 PROCEDURE — 0 POTASSIUM CHLORIDE 10 MEQ/100ML SOLUTION: Performed by: INTERNAL MEDICINE

## 2022-03-15 PROCEDURE — 76937 US GUIDE VASCULAR ACCESS: CPT | Performed by: NURSE PRACTITIONER

## 2022-03-15 PROCEDURE — 99232 SBSQ HOSP IP/OBS MODERATE 35: CPT | Performed by: INTERNAL MEDICINE

## 2022-03-15 PROCEDURE — 25010000002 METOCLOPRAMIDE PER 10 MG: Performed by: INTERNAL MEDICINE

## 2022-03-15 PROCEDURE — 84100 ASSAY OF PHOSPHORUS: CPT | Performed by: NURSE PRACTITIONER

## 2022-03-15 PROCEDURE — 80048 BASIC METABOLIC PNL TOTAL CA: CPT | Performed by: NURSE PRACTITIONER

## 2022-03-15 PROCEDURE — 93010 ELECTROCARDIOGRAM REPORT: CPT | Performed by: INTERNAL MEDICINE

## 2022-03-15 PROCEDURE — 05HM33Z INSERTION OF INFUSION DEVICE INTO RIGHT INTERNAL JUGULAR VEIN, PERCUTANEOUS APPROACH: ICD-10-PCS | Performed by: INTERNAL MEDICINE

## 2022-03-15 PROCEDURE — 25010000002 CEFTRIAXONE PER 250 MG: Performed by: INTERNAL MEDICINE

## 2022-03-15 PROCEDURE — 25010000002 LORAZEPAM PER 2 MG: Performed by: INTERNAL MEDICINE

## 2022-03-15 PROCEDURE — 71045 X-RAY EXAM CHEST 1 VIEW: CPT

## 2022-03-15 PROCEDURE — 97161 PT EVAL LOW COMPLEX 20 MIN: CPT

## 2022-03-15 PROCEDURE — 85025 COMPLETE CBC W/AUTO DIFF WBC: CPT | Performed by: INTERNAL MEDICINE

## 2022-03-15 PROCEDURE — 36556 INSERT NON-TUNNEL CV CATH: CPT | Performed by: NURSE PRACTITIONER

## 2022-03-15 PROCEDURE — 25010000002 HYDRALAZINE PER 20 MG: Performed by: INTERNAL MEDICINE

## 2022-03-15 PROCEDURE — 99222 1ST HOSP IP/OBS MODERATE 55: CPT | Performed by: PHYSICIAN ASSISTANT

## 2022-03-15 PROCEDURE — C1751 CATH, INF, PER/CENT/MIDLINE: HCPCS

## 2022-03-15 PROCEDURE — 82962 GLUCOSE BLOOD TEST: CPT

## 2022-03-15 RX ORDER — PANTOPRAZOLE SODIUM 40 MG/10ML
40 INJECTION, POWDER, LYOPHILIZED, FOR SOLUTION INTRAVENOUS EVERY 12 HOURS SCHEDULED
Status: DISCONTINUED | OUTPATIENT
Start: 2022-03-15 | End: 2022-03-18

## 2022-03-15 RX ADMIN — POTASSIUM CHLORIDE, DEXTROSE MONOHYDRATE AND SODIUM CHLORIDE 150 ML/HR: 150; 5; 450 INJECTION, SOLUTION INTRAVENOUS at 23:11

## 2022-03-15 RX ADMIN — HYDRALAZINE HYDROCHLORIDE 10 MG: 10 TABLET ORAL at 14:49

## 2022-03-15 RX ADMIN — Medication 1000 UNITS: at 08:31

## 2022-03-15 RX ADMIN — METOCLOPRAMIDE 5 MG: 5 INJECTION, SOLUTION INTRAMUSCULAR; INTRAVENOUS at 17:08

## 2022-03-15 RX ADMIN — Medication 1 TABLET: at 08:31

## 2022-03-15 RX ADMIN — AMLODIPINE BESYLATE 10 MG: 10 TABLET ORAL at 08:31

## 2022-03-15 RX ADMIN — Medication 325 MG: at 08:31

## 2022-03-15 RX ADMIN — DONEPEZIL HYDROCHLORIDE 5 MG: 5 TABLET, FILM COATED ORAL at 22:10

## 2022-03-15 RX ADMIN — Medication 10 ML: at 22:31

## 2022-03-15 RX ADMIN — TRAMADOL HYDROCHLORIDE 50 MG: 50 TABLET, COATED ORAL at 22:10

## 2022-03-15 RX ADMIN — ATORVASTATIN CALCIUM 80 MG: 40 TABLET, FILM COATED ORAL at 22:12

## 2022-03-15 RX ADMIN — HYDRALAZINE HYDROCHLORIDE 10 MG: 20 INJECTION INTRAMUSCULAR; INTRAVENOUS at 01:10

## 2022-03-15 RX ADMIN — OXYCODONE HYDROCHLORIDE AND ACETAMINOPHEN 500 MG: 500 TABLET ORAL at 08:31

## 2022-03-15 RX ADMIN — PANTOPRAZOLE SODIUM 40 MG: 40 INJECTION, POWDER, LYOPHILIZED, FOR SOLUTION INTRAVENOUS at 23:31

## 2022-03-15 RX ADMIN — INSULIN HUMAN 1.5 UNITS/HR: 1 INJECTION, SOLUTION INTRAVENOUS at 07:33

## 2022-03-15 RX ADMIN — LORAZEPAM 0.25 MG: 2 INJECTION INTRAMUSCULAR; INTRAVENOUS at 01:34

## 2022-03-15 RX ADMIN — FLUOXETINE HYDROCHLORIDE 20 MG: 20 CAPSULE ORAL at 08:31

## 2022-03-15 RX ADMIN — LORAZEPAM 0.25 MG: 2 INJECTION INTRAMUSCULAR; INTRAVENOUS at 15:01

## 2022-03-15 RX ADMIN — ACETAMINOPHEN 650 MG: 325 TABLET ORAL at 08:38

## 2022-03-15 RX ADMIN — METOCLOPRAMIDE 5 MG: 5 INJECTION, SOLUTION INTRAMUSCULAR; INTRAVENOUS at 00:09

## 2022-03-15 RX ADMIN — METOCLOPRAMIDE 5 MG: 5 INJECTION, SOLUTION INTRAMUSCULAR; INTRAVENOUS at 06:00

## 2022-03-15 RX ADMIN — POTASSIUM CHLORIDE 10 MEQ: 7.46 INJECTION, SOLUTION INTRAVENOUS at 01:56

## 2022-03-15 RX ADMIN — POTASSIUM CHLORIDE 10 MEQ: 7.46 INJECTION, SOLUTION INTRAVENOUS at 03:20

## 2022-03-15 RX ADMIN — PANTOPRAZOLE SODIUM 40 MG: 40 INJECTION, POWDER, LYOPHILIZED, FOR SOLUTION INTRAVENOUS at 14:49

## 2022-03-15 RX ADMIN — TRAMADOL HYDROCHLORIDE 50 MG: 50 TABLET, COATED ORAL at 12:30

## 2022-03-15 RX ADMIN — HYDRALAZINE HYDROCHLORIDE 10 MG: 10 TABLET ORAL at 06:02

## 2022-03-15 RX ADMIN — HYDRALAZINE HYDROCHLORIDE 10 MG: 20 INJECTION INTRAMUSCULAR; INTRAVENOUS at 19:30

## 2022-03-15 RX ADMIN — TERAZOSIN HYDROCHLORIDE 1 MG: 1 CAPSULE ORAL at 22:12

## 2022-03-15 RX ADMIN — METOCLOPRAMIDE 5 MG: 5 INJECTION, SOLUTION INTRAMUSCULAR; INTRAVENOUS at 12:29

## 2022-03-15 RX ADMIN — ACETAMINOPHEN 650 MG: 325 TABLET ORAL at 03:20

## 2022-03-15 RX ADMIN — LORAZEPAM 0.25 MG: 2 INJECTION INTRAMUSCULAR; INTRAVENOUS at 08:50

## 2022-03-15 RX ADMIN — ASPIRIN 81 MG: 81 TABLET, COATED ORAL at 08:31

## 2022-03-15 RX ADMIN — POTASSIUM CHLORIDE, DEXTROSE MONOHYDRATE AND SODIUM CHLORIDE 150 ML/HR: 150; 5; 450 INJECTION, SOLUTION INTRAVENOUS at 15:14

## 2022-03-15 RX ADMIN — Medication 10 ML: at 22:11

## 2022-03-15 RX ADMIN — LORAZEPAM 0.25 MG: 2 INJECTION INTRAMUSCULAR; INTRAVENOUS at 22:12

## 2022-03-15 NOTE — THERAPY EVALUATION
Patient Name: Thelma Michael  : 1960    MRN: [id number]                              Today's Date: 3/15/2022       Admit Date: 3/12/2022    Visit Dx:     ICD-10-CM ICD-9-CM   1. Type 1 diabetes mellitus with ketoacidosis without coma (HCC)  E10.10 250.13   2. Non-intractable vomiting with nausea, unspecified vomiting type  R11.2 787.01   3. Gastroparesis  K31.84 536.3     Patient Active Problem List   Diagnosis   • Gastroesophageal reflux disease   • Back pain   • Diabetic peripheral neuropathy (HCC)   • Hyperlipidemia   • Hypertension   • Vitamin D deficiency   • Osteoporosis   • Tobacco use   • Personal history of cardiac murmur   • Pelvic pain   • History of sepsis   • Migraines   • Urinary retention   • Depression with anxiety   • Primary insomnia   • Iron deficiency anemia secondary to inadequate dietary iron intake   • Vertigo   • Acute UTI   • Severe malnutrition (HCC)   • Hypertensive urgency   • Uncontrolled type 1 diabetes mellitus with hyperglycemia (HCC)   • Diabetic ketoacidosis without coma associated with type 1 diabetes mellitus (HCC)   • Non-intractable vomiting   • History of TIAs   • Gastroparesis   • Hypokalemia   • Dehydration   • Nausea vomiting and diarrhea   • Hypokalemia   • Elevated serum creatinine   • Thrombocytosis   • Hypercalcemia   • Elevated serum protein level   • Leukocytosis   • Type 1 diabetes mellitus with ketoacidosis without coma (HCC)   • Bacteriuria     Past Medical History:   Diagnosis Date   • Acid reflux    • Acute bronchitis    • Cardiac murmur    • Diabetes mellitus (HCC)    • H/O echocardiogram 2012    i. LVEF 65%.ii. Mild LVH.iii. Borderline evidence of atrial septal aneurysm.  No PFO.    • History of nuclear stress test 2014    Negative for ischemia and scars; LVEF 77%.     • Hyperlipidemia    • Hypertension    • Impacted cerumen of both ears    • Migraine    • Self-catheterizes urinary bladder    • Sinusitis    • Stroke (HCC)    • Tobacco  abuse     quit 4 days ago.     • Urticaria      Past Surgical History:   Procedure Laterality Date   • CAPSULE ENDOSCOPY  7/27/2021    Procedure: PILLCAM DEPLOYMENT;  Surgeon: Mikael Worthy MD;  Location:  [facility];  Service: Gastroenterology;;   • COLONOSCOPY     • COLONOSCOPY N/A 7/27/2021    Procedure: COLONOSCOPY;  Surgeon: Mikael Worthy MD;  Location:  [facility];  Service: Gastroenterology;  Laterality: N/A;   • DENTAL PROCEDURE     • ENDOSCOPY N/A 6/30/2021    Procedure: ESOPHAGOGASTRODUODENOSCOPY;  Surgeon: Brunner, Mark I, MD;  Location:  [facility];  Service: Gastroenterology;  Laterality: N/A;   • ENDOSCOPY N/A 7/27/2021    Procedure: ESOPHAGOGASTRODUODENOSCOPY;  Surgeon: Mikael Worthy MD;  Location:  [facility];  Service: Gastroenterology;  Laterality: N/A;   • NO PAST SURGERIES        General Information     Row Name 03/15/22 1306          Physical Therapy Time and Intention    Document Type evaluation  -CD     Mode of Treatment physical therapy  -CD     Row Name 03/15/22 1306          General Information    Patient Profile Reviewed yes  -CD     Prior Level of Function independent:;gait;transfer;bed mobility;ADL's;using stairs;min assist:;home management;cooking;cleaning  USES CANE OR R WX ONLY ON OCCASION.  -CD     Existing Precautions/Restrictions fall  NAUSEA, VOMITING, ABDOMINAL CRAMPING.  -CD     Barriers to Rehab none identified  -CD     Row Name 03/15/22 1306          Living Environment    People in Home child(bronson), adult  LIVES WITH SON WHO'S IN/OUT. DTRS LIVE CLOSE BY AND CAN ASSIST PRN ALSO.  -CD     Row Name 03/15/22 1306          Home Main Entrance    Number of Stairs, Main Entrance other (see comments)  20 STEPS WITH NO RAILS.  -CD     Stair Railings, Main Entrance none  -CD     Row Name 03/15/22 1306          Stairs Within Home, Primary    Stairs, Within Home, Primary 0  -CD     Row Name 03/15/22 1307          Cognition    Orientation Status  (Cognition) oriented x 4  -CD     Row Name 03/15/22 1306          Safety Issues, Functional Mobility    Safety Issues Affecting Function (Mobility) insight into deficits/self-awareness;safety precaution awareness;awareness of need for assistance  -CD     Impairments Affecting Function (Mobility) balance;endurance/activity tolerance;strength;postural/trunk control  -CD     Comment, Safety Issues/Impairments (Mobility) PT ALERT AND FOLLOWS ALL COMMANDS. MOBILITY LIMITED BY NAUSEA, WEAKNESS.  -CD           User Key  (r) = Recorded By, (t) = Taken By, (c) = Cosigned By    Initials Name Provider Type    Carina Gordon, PT Physical Therapist               Mobility     Row Name 03/15/22 1312          Bed Mobility    Sit-Supine [place] (Bed Mobility) minimum assist (75% patient effort);verbal cues  -CD     Assistive Device (Bed Mobility) bed rails;head of bed elevated  -CD     Row Name 03/15/22 1312          Transfers    Comment, (Transfers) CUES FOR HAND PLACEMENT. STAND STEP PIVOT TO RECLINER VIA B UE SUPPORT AND ASSIST FOR LINES.  -CD     Row Name 03/15/22 1312          Bed-Chair Transfer    Bed-Chair [place] (Transfers) minimum assist (75% patient effort)  -CD     Assistive Device (Bed-Chair Transfers) --  B UE SUPPORT.  -CD     Comment, (Bed-Chair Transfer) NO OVERT LOB BUT FATIGUES QUICKLY WITH TRANSITION.  -CD     Row Name 03/15/22 1312          Sit-Stand Transfer    Sit-Stand [place] (Transfers) contact guard;verbal cues  -CD     Assistive Device (Sit-Stand Transfers) --  VIA B UE SUPPORT.  -CD     Row Name 03/15/22 1312          Gait/Stairs (Locomotion)    Comment, (Gait/Stairs) DEFERRED GAIT DUE TO FATIGUE,  NAUSEA, WEAKNESS. PT ENCOURAGED TO SIT UIC FOR AT LEAST AN HOUR.  -CD           User Key  (r) = Recorded By, (t) = Taken By, (c) = Cosigned By    Initials Name Provider Type    Carina Gordon PT Physical Therapist               Obj/Interventions     Row Name 03/15/22 1319          Range  of Motion Comprehensive    General Range of Motion bilateral lower extremity ROM WNL  -CD     Row Name 03/15/22 1319          Strength Comprehensive (MMT)    General Manual Muscle Testing (MMT) Assessment lower extremity strength deficits identified  -CD     Comment, General Manual Muscle Testing (MMT) Assessment B HIP FLEX 3/5, KNEE EXT 4/5, DF 4/5.  -CD     Row Name 03/15/22 1319          Motor Skills    Motor Skills functional endurance  -CD     Functional Endurance O2 SATS STABLE ON RA. PT FATIGUES QUICKLY AND IS LIMITED BY NAUSEA, WEAKNESS.  -CD     Row Name 03/15/22 1319          Balance    Static Sitting Balance independent  -CD     Dynamic Sitting Balance independent  -CD     Position, Sitting Balance supported;sitting in chair  -CD     Static Standing Balance contact guard;verbal cues  -CD     Dynamic Standing Balance verbal cues;contact guard  -CD     Position/Device Used, Standing Balance supported  B UE SUPPORT.  -CD     Balance Interventions sitting;standing;sit to stand;supported;static;dynamic  -CD     Comment, Balance TRANSFERS IN ROOM WITH B UE SUPPORT. NO OVERT LOB BUT REMAINS MILDLY FLEXED AT TRUNK.  -CD           User Key  (r) = Recorded By, (t) = Taken By, (c) = Cosigned By    Initials Name Provider Type    CD Carina Gallardo, PT Physical Therapist               Goals/Plan     Row Name 03/15/22 1331          Bed Mobility Goal 1 (PT)    Activity/Assistive Device (Bed Mobility Goal 1, PT) bed mobility activities, all  -CD     [place] Level/Cues Needed (Bed Mobility Goal 1, PT) independent  -CD     Time Frame (Bed Mobility Goal 1, PT) long term goal (LTG);2 weeks  -CD     Row Name 03/15/22 1331          Transfer Goal 1 (PT)    Activity/Assistive Device (Transfer Goal 1, PT) transfers, all  -CD     [place] Level/Cues Needed (Transfer Goal 1, PT) independent  -CD     Time Frame (Transfer Goal 1, PT) long term goal (LTG);2 weeks  -CD     Row Name 03/15/22 1331          Gait Training Goal 1  (PT)    Activity/Assistive Device (Gait Training Goal 1, PT) gait (walking locomotion);assistive device use  WITH LEAST RESTRICTIVE DEVICE OR NO A.D.  -CD     [place] Level (Gait Training Goal 1, PT) independent  -CD     Distance (Gait Training Goal 1, PT) 400 FEET  -CD     Time Frame (Gait Training Goal 1, PT) long term goal (LTG);2 weeks  -CD     Row Name 03/15/22 1331          Stairs Goal 1 (PT)    Activity/Assistive Device (Stairs Goal 1, PT) ascending stairs;descending stairs;using handrail, right;using handrail, left  -CD     [place] Level/Cues Needed (Stairs Goal 1, PT) contact guard required  -CD     Number of Stairs (Stairs Goal 1, PT) 12 STEPS.  -CD     Time Frame (Stairs Goal 1, PT) long term goal (LTG);2 weeks  -CD           User Key  (r) = Recorded By, (t) = Taken By, (c) = Cosigned By    Initials Name Provider Type    CD Carina Gallardo, PT Physical Therapist               Clinical Impression     Row Name 03/15/22 1326          Pain    Pretreatment Pain Rating 7/10  -CD     Posttreatment Pain Rating 7/10  -CD     Pain Location - abdomen  -CD     Pre/Posttreatment Pain Comment PT REPORT NAUSEA AND STOMACH CRAMPING. RN AWARE.  -CD     Pain Intervention(s) Repositioned;Rest  -CD     Row Name 03/15/22 1326          Plan of Care Review    Plan of Care Reviewed With patient  -CD     Outcome Evaluation PT PRESENTS WITH DELCINE IN FUNCTIONAL MOBILITY RELATED TO NAUSEA, VOMITING AND WEAKNESS. PT FATIGUES QUICKLY AND DEMONSTRATES IMPAIRED BALANCE AND DECREASED ENDURANCE. RECOMMEND IRF AT D/C FOR RETURN TO MAX LEVEL OF FUNCTION PRIOR TO D/C HOME. PT NEEDS B HANDRAILS INSTALLED ON STAIRS AT APARTMENT.  -CD     Row Name 03/15/22 1326          Therapy Assessment/Plan (PT)    Patient/Family Therapy Goals Statement (PT) TO GO HOME.  -CD     Rehab Potential (PT) good, to achieve stated therapy goals  -CD     Criteria for Skilled Interventions Met (PT) yes  -CD     Row Name 03/15/22 1326          Vital Signs     Pre Systolic BP Rehab --  VSS. NSG CLEARED FOR TREATMENT.  -CD     Pre Patient Position Supine  -CD     Intra Patient Position Standing  -CD     Post Patient Position Sitting  -CD     Row Name 03/15/22 1326          Positioning and Restraints    Pre-Treatment Position in bed  -CD     Post Treatment Position chair  -CD     In Chair reclined;notified nsg;call light within reach;encouraged to call for assist;exit alarm on;legs elevated  PT SET UP WITH LUNCH TRAY (CLEAR LIQUIDS) BUT DID NOT HAVE APPETITE  -CD           User Key  (r) = Recorded By, (t) = Taken By, (c) = Cosigned By    Initials Name Provider Type    CD Carina Gallardo, PT Physical Therapist               Outcome Measures     Row Name 03/15/22 0800          How much help from another person do you currently need...    Turning from your back to your side while in flat bed without using bedrails? 4  -CC     Moving from lying on back to sitting on the side of a flat bed without bedrails? 4  -CC     Moving to and from a bed to a chair (including a wheelchair)? 3  -CC     Standing up from a chair using your arms (e.g., wheelchair, bedside chair)? 3  -CC     Climbing 3-5 steps with a railing? 2  -CC     To walk in hospital room? 2  -CC     AM-PAC 6 Clicks Score (PT) 18  -CC           User Key  (r) = Recorded By, (t) = Taken By, (c) = Cosigned By    Initials Name Provider Type    CC Natalie Simons, RN Registered Nurse                             Physical Therapy Education                 Title: PT OT SLP Therapies (In Progress)     Topic: Physical Therapy (Done)     Point: Mobility training (Done)     Learning Progress Summary           Patient Acceptance, E, VU,NR by CD at 3/15/2022 1333    Comment: BENEFITS OF OOB ACTIVITY, SAFETY WITH MOBILITY, PROGRESSION OF POC, D/C PLANNING,                   Point: Home exercise program (Done)     Learning Progress Summary           Patient Acceptance, E, VU,NR by CD at 3/15/2022 1333    Comment: BENEFITS OF OOB  ACTIVITY, SAFETY WITH MOBILITY, PROGRESSION OF POC, D/C PLANNING,                   Point: Body mechanics (Done)     Learning Progress Summary           Patient Acceptance, E, VU,NR by CD at 3/15/2022 1333    Comment: BENEFITS OF OOB ACTIVITY, SAFETY WITH MOBILITY, PROGRESSION OF POC, D/C PLANNING,                   Point: Precautions (Done)     Learning Progress Summary           Patient Acceptance, E, VU,NR by CD at 3/15/2022 1333    Comment: BENEFITS OF OOB ACTIVITY, SAFETY WITH MOBILITY, PROGRESSION OF POC, D/C PLANNING,                               User Key     Initials Effective Dates Name Provider Type Discipline     06/16/21 -  Carina Gallardo PT Physical Therapist PT              PT Recommendation and Plan  Planned Therapy Interventions (PT): balance training, bed mobility training, gait training, transfer training, stair training, strengthening, home exercise program  Plan of Care Reviewed With: patient  Outcome Evaluation: PT PRESENTS WITH DELCINE IN FUNCTIONAL MOBILITY RELATED TO NAUSEA, VOMITING AND WEAKNESS. PT FATIGUES QUICKLY AND DEMONSTRATES IMPAIRED BALANCE AND DECREASED ENDURANCE. RECOMMEND IRF AT D/C FOR RETURN TO MAX LEVEL OF FUNCTION PRIOR TO D/C HOME. PT NEEDS B HANDRAILS INSTALLED ON STAIRS AT APARTMENT.     Time Calculation:    PT Charges     Row Name 03/15/22 1339             Time Calculation    Start Time 1125  -CD      PT Received On 03/15/22  -CD      PT Goal Re-Cert Due Date 03/25/22  -CD              Untimed Charges    PT Eval/Re-eval Minutes 66  -CD              Total Minutes    Untimed Charges Total Minutes 66  -CD       Total Minutes 66  -CD            User Key  (r) = Recorded By, (t) = Taken By, (c) = Cosigned By    Initials Name Provider Type    CD Carina Gallardo PT Physical Therapist              Therapy Charges for Today     Code Description Service Date Service Provider Modifiers Qty    [id number]  PT EVAL LOW COMPLEXITY 4 3/15/2022 Carina Gallardo, PT GP 1          PT  G-Codes  Outcome Measure Options: AM-PAC 6 Clicks Daily Activity (OT)  AM-PAC 6 Clicks Score (PT): 18  AM-PAC 6 Clicks Score (OT): 16    Carina Gallardo, PT  3/15/2022

## 2022-03-15 NOTE — CONSULTS
[facility] Gastroenterology Consult    Referring Provider: Ivy Berry DO  PCP: Monet Howe APRN    Reason for Consultation: Gastroparesis, inability to tolerate PO    Chief complaint: Nausea and vomiting     History of present illness:    Thelma Michael is a 61 y.o. female who is admitted with DKA.  She has history of type I diabetes mellitus and is known to our service for history of gastroparesis. She had a gastric emptying study on 7/1/2021 that showed delayed gastric emptying with only 25% gastric emptying at 90 minutes.    She is followed outpatient by my colleague, Magali DENISE.  The patient was seen by our inpatient service last month and started on Tonya Root 500 mg TID before meals as well as Reglan PRN.   She was taking Reglan 5 mg BID at home.   She did well on this for three weeks but began to have nausea, vomiting and diarrhea on Friday, 3/11/22.       She was referred to the [facility] clinic but has not received information on when her appointment will be.      Allergies:  Patient has no known allergies.    Scheduled Meds:  Pharmacy Consult, , Does not apply, BID  amLODIPine, 10 mg, Oral, Daily  ascorbic acid, 500 mg, Oral, Daily  aspirin, 81 mg, Oral, Daily  atorvastatin, 80 mg, Oral, Nightly  cefTRIAXone, 1 g, Intravenous, Q24H  cholecalciferol, 1,000 Units, Oral, Daily  donepezil, 5 mg, Oral, Nightly  ferrous sulfate, 325 mg, Oral, Daily With Breakfast  FLUoxetine, 20 mg, Oral, Daily  hydrALAZINE, 10 mg, Oral, Q8H  metoclopramide, 5 mg, Intravenous, Q6H  multivitamin with minerals, 1 tablet, Oral, Daily  sodium chloride, 10 mL, Intravenous, Q12H  sodium chloride, 10 mL, Intravenous, Q12H  sodium chloride, 10 mL, Intravenous, Q12H  terazosin, 1 mg, Oral, Nightly         Infusions:  dextrose 5 % and sodium chloride 0.45 %, 150 mL/hr  dextrose 5 % and sodium chloride 0.45 % with KCl 20 mEq/L, 150 mL/hr, Last Rate: 150 mL/hr (03/15/22 1007)  dextrose 5 %  and sodium chloride 0.45 % with KCl 40 mEq/L, 150 mL/hr  dextrose 5 % and sodium chloride 0.9 %, 150 mL/hr  dextrose 5 % and sodium chloride 0.9 % with KCl 20 mEq, 150 mL/hr  dextrose 5% and sodium chloride 0.9% with KCl 40 mEq/L, 150 mL/hr  insulin, 5 Units/hr, Last Rate: 0.5 Units/hr (03/15/22 0906)  niCARdipine, 5-15 mg/hr, Last Rate: Stopped (03/14/22 1638)  sodium chloride, 10 mL/hr        PRN Meds:  acetaminophen **OR** acetaminophen **OR** acetaminophen  •  albuterol  •  calcium carbonate  •  dextrose  •  dextrose 5 % and sodium chloride 0.45 %  •  dextrose 5 % and sodium chloride 0.45 % with KCl 20 mEq/L  •  dextrose 5 % and sodium chloride 0.45 % with KCl 40 mEq/L  •  dextrose 5 % and sodium chloride 0.9 %  •  dextrose 5 % and sodium chloride 0.9 % with KCl 20 mEq  •  dextrose 5% and sodium chloride 0.9% with KCl 40 mEq/L  •  gabapentin  •  hydrALAZINE  •  LORazepam  •  magnesium sulfate **OR** magnesium sulfate **OR** magnesium sulfate  •  melatonin  •  potassium chloride **OR** potassium chloride **OR** potassium chloride  •  potassium phosphate infusion greater than 15 mMoles **OR** potassium phosphate infusion greater than 15 mMoles **OR** potassium phosphate **OR** sodium phosphate IVPB **OR** sodium phosphate IVPB **OR** sodium phosphate IVPB  •  Sodium Chloride (PF)  •  sodium chloride  •  traMADol    Home Meds:  Medications Prior to Admission   Medication Sig Dispense Refill Last Dose   • acetaminophen (TYLENOL) 325 MG tablet Take 2 tablets by mouth Every 4 (Four) Hours As Needed for Mild Pain .      • albuterol sulfate  (90 Base) MCG/ACT inhaler Inhale 2 puffs Every 4 (Four) Hours As Needed for Wheezing. 18 g 5    • amLODIPine (NORVASC) 10 MG tablet Take 1 tablet by mouth Daily. 90 tablet 1    • Ascorbic Acid (VITAMIN C PO) Vitamin C TABS      • aspirin 81 MG EC tablet Take 1 tablet by mouth Daily.      • atorvastatin (LIPITOR) 80 MG tablet Take 1 tablet by mouth Every Night. 90 tablet 1     • Blood Glucose Monitoring Suppl (FreeStyle Lite) w/Device kit USE UNIT TO CHECK GLUCOSE 4 TIMES DAILY      • calcium carbonate (TUMS) 500 MG chewable tablet Chew 1,000 mg 3 (Three) Times a Day As Needed for Indigestion or Heartburn.      • Continuous Blood Gluc  (Dexcom G6 ) device 1 kit Every 3 (Three) Months. 1 each 0    • Continuous Blood Gluc Sensor (Dexcom G6 Sensor) Every 10 (Ten) Days. 9 each 3    • Continuous Blood Gluc Transmit (Dexcom G6 Transmitter) misc 1 kit Every 3 (Three) Months. 1 each 3    • donepezil (Aricept) 5 MG tablet Take 1 tablet by mouth Every Night. 30 tablet 1    • doxazosin (CARDURA) 1 MG tablet Take 1 tablet by mouth Every Night. 90 tablet 1    • ferrous sulfate 325 (65 FE) MG tablet Take 1 tablet by mouth Daily With Breakfast. Take with orange juice or vitamin C 30 tablet 2    • FLUoxetine (PROzac) 20 MG capsule Take 1 capsule by mouth Daily. 90 capsule 1    • gabapentin (NEURONTIN) 400 MG capsule Take 1 capsule by mouth 2 (Two) Times a Day As Needed (leg and foot pain). 60 capsule 1    • glucose blood (Glucose Meter Test) test strip Use as instructed to check blood glucose levels 3 times daily 100 each 5    • glucose blood test strip Use as instructed 200 each 12    • glucose monitor monitoring kit 1 each 4 (Four) Times a Day. 1 each 0    • hydroCHLOROthiazide (HYDRODIURIL) 12.5 MG tablet Take 1 tablet by mouth Daily. 30 tablet 1    • Insulin Glargine (BASAGLAR KWIKPEN) 100 UNIT/ML injection pen Inject 20 Units under the skin into the appropriate area as directed Every Night. 15 mL 3    • Insulin Lispro, 1 Unit Dial, (HumaLOG KwikPen) 100 UNIT/ML solution pen-injector 15 units tid 45 mL 3    • Insulin Pen Needle (BD Pen Needle Cydney U/F) 32G X 4 MM misc Take 1 each by mouth 4 (Four) Times a Day. 100 each 5    • melatonin 5 MG tablet tablet Take 1 tablet by mouth At Night As Needed (sleep).      • Multiple Vitamins-Minerals (Multivitamin-Minerals) tablet Take 1 tablet  by mouth Daily.      • multivitamin (Multiple Vitamin) tablet tablet Take 1 tablet by mouth Daily. 30 tablet 11    • ondansetron (ZOFRAN) 4 MG tablet Take 1 tablet by mouth Every 6 (Six) Hours As Needed for Nausea or Vomiting. 15 tablet 0    • pantoprazole (PROTONIX) 40 MG EC tablet Take 1 tablet by mouth Daily. 30 tablet 5    • sennosides-docusate (senna-docusate sodium) 8.6-50 MG per tablet Take 2 tablets by mouth 2 (Two) Times a Day As Needed for Constipation. 60 tablet 5    • traMADol (ULTRAM) 50 MG tablet Take 1 tablet by mouth Every 6 (Six) Hours As Needed for Moderate Pain . 28 tablet 2    • traZODone (DESYREL) 50 MG tablet TAKE 1-2 TABLETS ONE HOUR BEFORE BEDTIME AS NEEDED FOR SLEEP. 45 tablet 0    • vitamin D (ERGOCALCIFEROL) 1.25 MG (50214 UT) capsule capsule Take 1 capsule by mouth once a week 4 capsule 0    • Wheat Dextrin (Benefiber Drink Mix) pack Take 1 Scoop by mouth Daily. 30 each 5        ROS: Review of Systems   Constitutional: Positive for fatigue.   HENT: Negative.    Eyes: Negative.    Respiratory: Negative.    Cardiovascular: Negative.    Gastrointestinal: Positive for abdominal pain, nausea and vomiting.   Endocrine: Negative.    Genitourinary: Negative.    Musculoskeletal: Negative.    Skin: Negative.    Allergic/Immunologic: Negative.    Neurological: Positive for weakness.   Hematological: Negative.    Psychiatric/Behavioral: Positive for sleep disturbance.       PAST MED HX:  Past Medical History:   Diagnosis Date   • Acid reflux    • Acute bronchitis    • Cardiac murmur    • Diabetes mellitus (HCC)    • H/O echocardiogram 08/07/2012    i. LVEF 65%.ii. Mild LVH.iii. Borderline evidence of atrial septal aneurysm.  No PFO.    • History of nuclear stress test 08/22/2014    Negative for ischemia and scars; LVEF 77%.     • Hyperlipidemia    • Hypertension    • Impacted cerumen of both ears    • Migraine    • Self-catheterizes urinary bladder    • Sinusitis    • Stroke (HCC)    • Tobacco abuse   "   quit 4 days ago.     • Urticaria        PAST SURG HX:  Past Surgical History:   Procedure Laterality Date   • CAPSULE ENDOSCOPY  2021    Procedure: PILLCAM DEPLOYMENT;  Surgeon: Mikael Worthy MD;  Location:  [facility];  Service: Gastroenterology;;   • COLONOSCOPY     • COLONOSCOPY N/A 2021    Procedure: COLONOSCOPY;  Surgeon: Mikael Worthy MD;  Location:  [facility];  Service: Gastroenterology;  Laterality: N/A;   • DENTAL PROCEDURE     • ENDOSCOPY N/A 2021    Procedure: ESOPHAGOGASTRODUODENOSCOPY;  Surgeon: Brunner, Mark I, MD;  Location:  [facility];  Service: Gastroenterology;  Laterality: N/A;   • ENDOSCOPY N/A 2021    Procedure: ESOPHAGOGASTRODUODENOSCOPY;  Surgeon: Mikael Worthy MD;  Location:  [facility];  Service: Gastroenterology;  Laterality: N/A;   • NO PAST SURGERIES         FAM HX:  Family History   Problem Relation Age of Onset   • Anxiety disorder Other    • Arthritis Other    • ADD / ADHD Other    • Heart disease Other         cardiac disorder   • Depression Other    • Diabetes Other    • Hyperlipidemia Other    • Hypertension Other    • Lung cancer Other    • Osteoporosis Other    • Hypertension Brother    • Diabetes Brother    • Hyperlipidemia Mother    • Hypertension Mother    • Colon cancer Neg Hx        SOC HX:  Social History     Socioeconomic History   • Marital status:    Tobacco Use   • Smoking status: Former Smoker     Quit date: 2019     Years since quittin.8   • Smokeless tobacco: Never Used   • Tobacco comment: BC PL never smoker    Vaping Use   • Vaping Use: Never used   Substance and Sexual Activity   • Alcohol use: Yes     Alcohol/week: 1.0 standard drink     Types: 1 Glasses of wine per week     Comment: ocassional   • Drug use: No   • Sexual activity: Defer     Comment: Single        PHYSICAL EXAM  /74   Pulse 71   Temp 99.2 °F (37.3 °C) (Oral)   Resp 16   Ht 172.7 cm (68\")   Wt 60.6 kg (133 " lb 8 oz)   SpO2 99%   BMI 20.30 kg/m²   Wt Readings from Last 3 Encounters:   03/15/22 60.6 kg (133 lb 8 oz)   02/25/22 64 kg (141 lb)   02/23/22 63.5 kg (140 lb)   ,body mass index is 20.3 kg/m².  Physical Exam  Constitutional:       Comments: Thin ill appearing female sitting up in bed    HENT:      Head: Normocephalic and atraumatic.      Mouth/Throat:      Mouth: Mucous membranes are moist.   Eyes:      General: No scleral icterus.  Cardiovascular:      Rate and Rhythm: Normal rate and regular rhythm.   Pulmonary:      Effort: Pulmonary effort is normal.      Breath sounds: Normal breath sounds.   Abdominal:      General: Bowel sounds are normal. There is no distension.      Palpations: Abdomen is soft.      Tenderness: There is no abdominal tenderness.   Musculoskeletal:         General: Normal range of motion.      Right lower leg: No edema.      Left lower leg: No edema.   Skin:     General: Skin is warm and dry.   Neurological:      Mental Status: She is oriented to person, place, and time.   Psychiatric:      Comments: Depressed mood, tearful       Results Review:   I reviewed the patient's new clinical results.    Lab Results   Component Value Date    WBC 8.20 03/14/2022    HGB 10.5 (L) 03/14/2022    HGB 11.7 (L) 03/13/2022    HGB 13.3 03/13/2022    HCT 31.9 (L) 03/14/2022    MCV 91.9 03/14/2022     03/14/2022       Lab Results   Component Value Date    INR 0.95 05/20/2021    INR 1.10 10/31/2019    INR 0.9 10/31/2019       Lab Results   Component Value Date    GLUCOSE 160 (H) 03/15/2022    BUN 4 (L) 03/15/2022    CREATININE 0.81 03/15/2022    EGFRIFNONA 61 01/06/2015    EGFRIFAFRI 62 02/13/2022    BCR 4.9 (L) 03/15/2022     03/15/2022    K 4.4 03/15/2022    CO2 16.0 (L) 03/15/2022    CALCIUM 8.6 03/15/2022    PROTENTOTREF 7.6 01/06/2015    ALBUMIN 4.20 03/13/2022    ALKPHOS 84 03/13/2022    BILITOT 0.6 03/13/2022    ALT 16 03/13/2022    AST 30 03/13/2022     CT Angiogram abdomen/pelvis (as  interpreted by radiologist)  ABDOMEN:   Liver and Biliary system:  Normal.   Adrenal glands:  Bilateral adrenal nodules appear unchanged most likely adenomas.   Kidneys and ureters: Subcentimeter hypodensities are seen within the left kidney that are too small fully characterize. The kidneys otherwise appear unremarkable.   Spleen:  Normal.   Pancreas:  Normal. Gallbladder:  Normal.   Lymph nodes, Peritoneum and mesentery:  There is no mesenteric or retroperitoneal lymphadenopathy.   Gastrointestinal tract:  There are no dilated loops of bowel or free intraperitoneal air.    The appendix is normal. There is mild sigmoid diverticulosis without evidence of diverticulitis.    Aorta/IVC:   There is moderate vascular calcification throughout the abdominal aorta without evidence of aneurysmal dilation or dissection.  IVC normal.   Abdominal wall:  Normal.   PELVIS:   Fluid: There is no free fluid in the pelvis.   Lymph Nodes:  There is no pelvic or inguinal lymphadenopathy..   Urinary bladder:  Lobular contour to the liver is unchanged..   BONES:  There are no osseous destructive lesions..   ADDITIONAL  SIGNIFICANT FINDINGS:  None.   IMPRESSION:   1. No acute process seen within the abdomen or pelvis.  2. Diverticulosis without evidence of diverticulitis.    ASSESSMENTS/PLANS    1. Gastroparesis  2. Nausea and vomiting  3. Diarrhea  4. DKA  5. Type I Diabetes Mellitus     >>> Recommend EGD tomorrow with endoscopic placement of nasojejunal tube for enteral nutrition  >>> NPO at midnight  >>> Discontinue Ferrous sulfate as this can contribute to nausea  >>> Add IV Protonix 40 mg BID to decrease gastric secretions   >>> Continue Reglan 5 mg q6h   >>> Obtain GI panel PCR     I discussed the patient's findings and my recommendations with patient    JAMESON Sharma  03/15/22  11:36 EDT

## 2022-03-15 NOTE — CONSULTS
" Discussed and taught patient about type 1 diabetes self-management, risk factors, and importance of blood glucose control to reduce complications. Target blood glucose readings and A1c goals per ADA were reviewed. Reviewed with patient current A1c 8.4 and discussed its significance. Encouraged pt to monitor blood sugar at home 3+  times per day and to call PCP if blood sugar is trending leia. Encouraged to keep record of blood glucose readings to take to follow up appointment with PCP. Reviewed ways to reduce risk of developing dka such as developing sick day plan with doctor, taking medication as prescribed and notifying provider should blood sugars not respond to medications as well us urine ketone testing and checking sugars more often when sick. Pt states she is able to take medications as prescribed and checks glucose regularly. At this time she states she \" feel horrible\" and would like to rest more. Please re consult for further education as desired. Thank you for this referral.  "

## 2022-03-15 NOTE — PROGRESS NOTES
[facility] Medicine Services  PROGRESS NOTE    Patient Name: Thelma Michael  : 1960  MRN: [id number]    Date of Admission: 3/12/2022  Primary Care Physician: Monet Howe APRN    Subjective   Subjective     CC:  N/V; DKA     HPI:  No acute events but remains nauseated. Unable to keep anything down. + Back pain. Tearful.     ROS:  Gen- No fevers, chills  CV- No chest pain, palpitations  Resp- No cough, dyspnea  GI- + N/V/D, abd pain     Objective   Objective     Vital Signs:   Temp:  [98.2 °F (36.8 °C)-99.3 °F (37.4 °C)] 99.2 °F (37.3 °C)  Heart Rate:  [] 71  Resp:  [16-18] 16  BP: (136-188)/(56-87) 154/74     Physical Exam:  Constitutional: No acute distress, awake, alert; frail and appears to feel unwell   HENT: NCAT, mucous membranes moist  Respiratory: Clear to auscultation bilaterally, respiratory effort normal   Cardiovascular: RRR, no murmurs, rubs, or gallops  Gastrointestinal: Positive bowel sounds, soft, nontender, nondistended  Musculoskeletal: No bilateral ankle edema  Psychiatric: Appropriate affect, cooperative; tearful  Neurologic: Oriented x 3, strength symmetric in all extremities, Cranial Nerves grossly intact to confrontation, speech clear  Skin: No rashes    Results Reviewed:  LAB RESULTS:      Lab 03/15/22  1108 22  0343 22  0558 22  0101 22  0058   WBC 7.83 8.20 12.31*  --  8.65   HEMOGLOBIN 10.6* 10.5* 11.7*  --  13.3   HEMATOCRIT 31.3* 31.9* 34.2  --  38.4   PLATELETS 362 335 433  --  519*   NEUTROS ABS 5.58 5.75 11.64*  --  7.35*   IMMATURE GRANS (ABS) 0.04 0.03 0.04  --  0.04   LYMPHS ABS 1.60 1.80 0.49*  --  1.00   MONOS ABS 0.57 0.61 0.12  --  0.23   EOS ABS 0.02 0.00 0.00  --  0.01   MCV 87.4 91.9 88.4  --  86.7   PROCALCITONIN  --   --   --  0.07  --    LACTATE  --   --  3.3*  --  3.5*         Lab 03/15/22  1108 03/15/22  0745 22  2359 22  1960 22  1636 22  0101 22  0058    SODIUM 135* 136 139 137 135*   < >  --    POTASSIUM 3.7 4.4 3.7 3.6 3.6   < >  --    CHLORIDE 106 107 107 106 106   < >  --    CO2 17.0* 16.0* 18.0* 19.0* 17.0*   < >  --    ANION GAP 12.0 13.0 14.0 12.0 12.0   < >  --    BUN 3* 4* 4* 4* 5*   < >  --    CREATININE 0.74 0.81 0.72 0.77 0.73   < >  --    EGFR 91.0 81.7 94.1 86.8 92.5   < >  --    GLUCOSE 195* 160* 163* 123* 281*   < >  --    CALCIUM 8.5* 8.6 8.5* 8.7 8.1*   < >  --    MAGNESIUM 1.8 1.9 1.9 2.0 2.1   < >  --    PHOSPHORUS 2.8 2.7 3.4 2.9 2.8   < >  --    HEMOGLOBIN A1C  --   --   --   --   --   --  8.40*    < > = values in this interval not displayed.         Lab 03/13/22  0558 03/13/22  0101   TOTAL PROTEIN 7.1 7.8   ALBUMIN 4.20 4.50   GLOBULIN 2.9 3.3   ALT (SGPT) 16 17   AST (SGOT) 30 17   BILIRUBIN 0.6 0.7   ALK PHOS 84 91   LIPASE  --  25         Lab 03/13/22  0101   TROPONIN T 0.012                 Lab 03/13/22  0516   PH, ARTERIAL 7.489*   PCO2, ARTERIAL 26.6*   PO2 ART 99.9   FIO2 21   HCO3 ART 20.2   BASE EXCESS ART -1.9*   CARBOXYHEMOGLOBIN 0.7     Brief Urine Lab Results  (Last result in the past 365 days)      Color   Clarity   Blood   Leuk Est   Nitrite   Protein   CREAT   Urine HCG        03/13/22 0121 Yellow   Cloudy   Trace   Negative   Negative   >=300 mg/dL (3+)                 Microbiology Results Abnormal     Procedure Component Value - Date/Time    Blood Culture - Blood, Arm, Right [[id number]]  (Normal) Collected: 03/13/22 0600    Lab Status: Preliminary result Specimen: Blood from Arm, Right Updated: 03/15/22 0646     Blood Culture No growth at 2 days    Blood Culture - Blood, Arm, Left [[id number]]  (Normal) Collected: 03/13/22 0540    Lab Status: Preliminary result Specimen: Blood from Arm, Left Updated: 03/15/22 0645     Blood Culture No growth at 2 days    Urine Culture - Urine, Urine, Clean Catch [[id number]] Collected: 03/13/22 0121    Lab Status: Final result Specimen: Urine, Clean Catch Updated: 03/14/22 1012     Urine  Culture 50,000 CFU/mL Mixed Sameera Isolated    Narrative:      Specimen contains mixed organisms of questionable pathogenicity which indicates contamination with commensal sameera.  Further identification is unlikely to provide clinically useful information.  Suggest recollection.    Respiratory Panel PCR w/COVID-19(SARS-CoV-2) NORMA/JUAN ALBERTO/EDY/PAD/[facility]/MAD/ROSALINA In-House, NP Swab in UTM/VTM, 3-4 HR TAT - Swab, Nasopharynx [[id number]]  (Normal) Collected: 03/13/22 0639    Lab Status: Final result Specimen: Swab from Nasopharynx Updated: 03/13/22 0739     ADENOVIRUS, PCR Not Detected     Coronavirus 229E Not Detected     Coronavirus HKU1 Not Detected     Coronavirus NL63 Not Detected     Coronavirus OC43 Not Detected     COVID19 Not Detected     Human Metapneumovirus Not Detected     Human Rhinovirus/Enterovirus Not Detected     Influenza A PCR Not Detected     Influenza B PCR Not Detected     Parainfluenza Virus 1 Not Detected     Parainfluenza Virus 2 Not Detected     Parainfluenza Virus 3 Not Detected     Parainfluenza Virus 4 Not Detected     RSV, PCR Not Detected     Bordetella pertussis pcr Not Detected     Bordetella parapertussis PCR Not Detected     Chlamydophila pneumoniae PCR Not Detected     Mycoplasma pneumo by PCR Not Detected    Narrative:      In the setting of a positive respiratory panel with a viral infection PLUS a negative procalcitonin without other underlying concern for bacterial infection, consider observing off antibiotics or discontinuation of antibiotics and continue supportive care. If the respiratory panel is positive for atypical bacterial infection (Bordetella pertussis, Chlamydophila pneumoniae, or Mycoplasma pneumoniae), consider antibiotic de-escalation to target atypical bacterial infection.          No radiology results from the last 24 hrs    Results for orders placed during the hospital encounter of 11/19/19    Adult Transesophageal Echo (LIZANDRO) W/ Cont if Necessary Per  Protocol    Interpretation Summary  · Left ventricular systolic function is normal. Estimated EF = 65%.  · Left ventricular wall thickness is consistent with hypertrophy.  · Small patent foramen ovale present. Saline test results are positive with valsalva manuever.  · There is mild mitral valve prolapse of the anterior mitral leaflet.  · Mild tricuspid valve regurgitation is present.  · Estimated right ventricular systolic pressure from tricuspid regurgitation is mildly elevated (35-45 mmHg).  · There is mild plaque in the descending aorta present.      I have reviewed the medications:  Scheduled Meds:Pharmacy Consult, , Does not apply, BID  amLODIPine, 10 mg, Oral, Daily  ascorbic acid, 500 mg, Oral, Daily  aspirin, 81 mg, Oral, Daily  atorvastatin, 80 mg, Oral, Nightly  cefTRIAXone, 1 g, Intravenous, Q24H  cholecalciferol, 1,000 Units, Oral, Daily  donepezil, 5 mg, Oral, Nightly  FLUoxetine, 20 mg, Oral, Daily  hydrALAZINE, 10 mg, Oral, Q8H  metoclopramide, 5 mg, Intravenous, Q6H  multivitamin with minerals, 1 tablet, Oral, Daily  pantoprazole, 40 mg, Intravenous, Q12H  sodium chloride, 10 mL, Intravenous, Q12H  sodium chloride, 10 mL, Intravenous, Q12H  sodium chloride, 10 mL, Intravenous, Q12H  terazosin, 1 mg, Oral, Nightly      Continuous Infusions:dextrose 5 % and sodium chloride 0.45 %, 150 mL/hr  dextrose 5 % and sodium chloride 0.45 % with KCl 20 mEq/L, 150 mL/hr, Last Rate: 150 mL/hr (03/15/22 1514)  dextrose 5 % and sodium chloride 0.45 % with KCl 40 mEq/L, 150 mL/hr  dextrose 5 % and sodium chloride 0.9 %, 150 mL/hr  dextrose 5 % and sodium chloride 0.9 % with KCl 20 mEq, 150 mL/hr  dextrose 5% and sodium chloride 0.9% with KCl 40 mEq/L, 150 mL/hr  insulin, 5 Units/hr, Last Rate: 5 Units/hr (03/15/22 1449)  niCARdipine, 5-15 mg/hr, Last Rate: Stopped (03/14/22 1638)  sodium chloride, 10 mL/hr      PRN Meds:.acetaminophen **OR** acetaminophen **OR** acetaminophen  •  albuterol  •  calcium carbonate  •   dextrose  •  dextrose 5 % and sodium chloride 0.45 %  •  dextrose 5 % and sodium chloride 0.45 % with KCl 20 mEq/L  •  dextrose 5 % and sodium chloride 0.45 % with KCl 40 mEq/L  •  dextrose 5 % and sodium chloride 0.9 %  •  dextrose 5 % and sodium chloride 0.9 % with KCl 20 mEq  •  dextrose 5% and sodium chloride 0.9% with KCl 40 mEq/L  •  gabapentin  •  hydrALAZINE  •  LORazepam  •  magnesium sulfate **OR** magnesium sulfate **OR** magnesium sulfate  •  melatonin  •  potassium chloride **OR** potassium chloride **OR** potassium chloride  •  potassium phosphate infusion greater than 15 mMoles **OR** potassium phosphate infusion greater than 15 mMoles **OR** potassium phosphate **OR** sodium phosphate IVPB **OR** sodium phosphate IVPB **OR** sodium phosphate IVPB  •  Sodium Chloride (PF)  •  sodium chloride  •  traMADol    Assessment/Plan   Assessment & Plan     Active Hospital Problems    Diagnosis  POA   • Type 1 diabetes mellitus with ketoacidosis without coma (HCC) [E10.10]  Yes   • Bacteriuria [R82.71]  Yes   • Gastroparesis [K31.84]  Yes   • Diabetic ketoacidosis without coma associated with type 1 diabetes mellitus (HCC) [E10.10]  Yes   • Hypertensive urgency [I16.0]  Yes   • Gastroesophageal reflux disease [K21.9]  Yes   • Hyperlipidemia [E78.5]  Yes      Resolved Hospital Problems   No resolved problems to display.        Brief Hospital Course to date:  61 year old female presents to the ED with with Nausea/vomiting/diarrhea and abdominal pain that began yesterday.      DKA  Type 1 Diabetes   -anion gap: 23.0, glucose 316, ABG noted above, UA: glucose and ketones present  - gap closed but continue insulin gtt as unable to tolerate PO intake    -hgb A1c 8.40    N/v  - Has been unable to tolerate PO intake. Per patient, feels similar to gastroparesis   - GI consulted -- planning EGD with endoscopic placement of nasojejunal tube tomorrow  - IV PPI; continue reglan      Hypokalemia   - continue to replace       Hypertensive urgency   - /107  - Continue home meds - amlodipine and hytrin (on cardura at home)  - start hydralazine 10 mg TID and now weaned off cardene gtt       Bacteriuria   - UA noted above   - UCx mixed sameera  - Continue rocephin x 5 days      Fever - improved   - UTI being treated; BCx NGTD  - CXR with no acute process   - Monitor for additional diarrhea     Diarrhea - improved   - No recent antibiotic use   -CT A/P no acute process   - Obtain GI PCR if has recurrence      Gastroparesis   - follows with GI  - getting referral to [facility] gastrointestinal motility clinic   - resume reglan      GERD  -PPI      Prolonged QT   - improved; limit contributing medications     DVT prophylaxis:  Mechanical DVT prophylaxis orders are present.       AM-PAC 6 Clicks Score (PT): 18 (03/15/22 0800)    Disposition: I expect the patient to be discharged TBD    CODE STATUS:   Code Status and Medical Interventions:   Ordered at: 03/13/22 0617     Level Of Support Discussed With:    Patient     Code Status (Patient has no pulse and is not breathing):    CPR (Attempt to Resuscitate)     Medical Interventions (Patient has pulse or is breathing):    Full Support       Ivy Berry DO  03/15/22

## 2022-03-15 NOTE — PLAN OF CARE
Goal Outcome Evaluation:  Plan of Care Reviewed With: patient           Outcome Evaluation: PT PRESENTS WITH DELCINE IN FUNCTIONAL MOBILITY RELATED TO NAUSEA, VOMITING AND WEAKNESS. PT FATIGUES QUICKLY AND DEMONSTRATES IMPAIRED BALANCE AND DECREASED ENDURANCE. RECOMMEND IRF AT D/C FOR RETURN TO MAX LEVEL OF FUNCTION PRIOR TO D/C HOME. PT NEEDS B [facility]AN[facility]S INSTALLED ON STAIRS AT APARTMENT AND WOULD LIKE A LETTER FROM MD TO TAKE TO HER [place] WITH THAT RECOMMENDATION.

## 2022-03-15 NOTE — PROGRESS NOTES
Clinical Nutrition     Nutrition Education   Reason for Visit:   Physician Consult  per DKA protocol      Patient Name: Thelma Michael  YOB: 1960  MRN: [id number]  Date of Encounter: 03/15/22 16:50 EDT  Admission date: 3/12/2022      Comments: Pt aware of DM diet and diet for gastroparesis. Has had material and education prior. Material given for review.       Applicable Diagnoses   DM I w gastroparesis, GERD, bactiuria this adm. Hx HTN HLD recent Tob+    Bed wt of 119 lbs consistent wit standng wt of 121 on 2/11. Wt of 141 lbs on 2/25/22 reported as fluid accumulation per family  Diet/Nutrition Related History:     Pt aware of DM diet and diet for gastroparesis. Has had material and education prior. ? re high fiber foods to avoid.      Labs reviewed   Yes  Hgb A1c 8.4    Nutrition Diagnosis     3/15  Problem Food and nutrition knowledge deficit potential   R/T DM w gastroparesis   Signs/Symptoms N/V/D on adm   Status: materials given for review of therapy    Goal:     Increase knowledge on diet/nutrition effects on condition/status     Nutrition Intervention       Education provided regarding nutrition therapy for: Diet rationale, Key food habit change and Other gastroparesis    Education provided regarding food habits/behavior related to:Food choices and Eating pattern     RD provided printed material via [employer]-Nutrition Care Manual         Monitoring/Evaluation:     Pt acknowledged understanding of material covered      Ella Mcmanus RD  Time Spent: 25 min

## 2022-03-16 ENCOUNTER — ANESTHESIA (OUTPATIENT)
Dept: GASTROENTEROLOGY | Facility: HOSPITAL | Age: 62
End: 2022-03-16

## 2022-03-16 ENCOUNTER — APPOINTMENT (OUTPATIENT)
Dept: GENERAL RADIOLOGY | Facility: HOSPITAL | Age: 62
End: 2022-03-16

## 2022-03-16 LAB
ANION GAP SERPL CALCULATED.3IONS-SCNC: 11 MMOL/L (ref 5–15)
ANION GAP SERPL CALCULATED.3IONS-SCNC: 13 MMOL/L (ref 5–15)
ANION GAP SERPL CALCULATED.3IONS-SCNC: 8 MMOL/L (ref 5–15)
BUN SERPL-MCNC: 3 MG/DL (ref 8–23)
BUN SERPL-MCNC: 4 MG/DL (ref 8–23)
BUN SERPL-MCNC: 4 MG/DL (ref 8–23)
BUN/CREAT SERPL: 4.4 (ref 7–25)
BUN/CREAT SERPL: 5.2 (ref 7–25)
BUN/CREAT SERPL: 5.2 (ref 7–25)
CALCIUM SPEC-SCNC: 8.4 MG/DL (ref 8.6–10.5)
CALCIUM SPEC-SCNC: 8.6 MG/DL (ref 8.6–10.5)
CALCIUM SPEC-SCNC: 8.9 MG/DL (ref 8.6–10.5)
CHLORIDE SERPL-SCNC: 105 MMOL/L (ref 98–107)
CHLORIDE SERPL-SCNC: 107 MMOL/L (ref 98–107)
CHLORIDE SERPL-SCNC: 108 MMOL/L (ref 98–107)
CO2 SERPL-SCNC: 19 MMOL/L (ref 22–29)
CO2 SERPL-SCNC: 21 MMOL/L (ref 22–29)
CO2 SERPL-SCNC: 21 MMOL/L (ref 22–29)
CREAT SERPL-MCNC: 0.68 MG/DL (ref 0.57–1)
CREAT SERPL-MCNC: 0.77 MG/DL (ref 0.57–1)
CREAT SERPL-MCNC: 0.77 MG/DL (ref 0.57–1)
DEPRECATED RDW RBC AUTO: 42 FL (ref 37–54)
EGFRCR SERPLBLD CKD-EPI 2021: 86.8 ML/MIN/1.73
EGFRCR SERPLBLD CKD-EPI 2021: 86.8 ML/MIN/1.73
EGFRCR SERPLBLD CKD-EPI 2021: 98 ML/MIN/1.73
ERYTHROCYTE [DISTWIDTH] IN BLOOD BY AUTOMATED COUNT: 12.9 % (ref 12.3–15.4)
GLUCOSE BLDC GLUCOMTR-MCNC: 103 MG/DL (ref 70–130)
GLUCOSE BLDC GLUCOMTR-MCNC: 121 MG/DL (ref 70–130)
GLUCOSE BLDC GLUCOMTR-MCNC: 134 MG/DL (ref 70–130)
GLUCOSE BLDC GLUCOMTR-MCNC: 139 MG/DL (ref 70–130)
GLUCOSE BLDC GLUCOMTR-MCNC: 141 MG/DL (ref 70–130)
GLUCOSE BLDC GLUCOMTR-MCNC: 144 MG/DL (ref 70–130)
GLUCOSE BLDC GLUCOMTR-MCNC: 166 MG/DL (ref 70–130)
GLUCOSE BLDC GLUCOMTR-MCNC: 176 MG/DL (ref 70–130)
GLUCOSE BLDC GLUCOMTR-MCNC: 178 MG/DL (ref 70–130)
GLUCOSE BLDC GLUCOMTR-MCNC: 191 MG/DL (ref 70–130)
GLUCOSE BLDC GLUCOMTR-MCNC: 201 MG/DL (ref 70–130)
GLUCOSE BLDC GLUCOMTR-MCNC: 202 MG/DL (ref 70–130)
GLUCOSE BLDC GLUCOMTR-MCNC: 204 MG/DL (ref 70–130)
GLUCOSE BLDC GLUCOMTR-MCNC: 205 MG/DL (ref 70–130)
GLUCOSE BLDC GLUCOMTR-MCNC: 216 MG/DL (ref 70–130)
GLUCOSE BLDC GLUCOMTR-MCNC: 218 MG/DL (ref 70–130)
GLUCOSE BLDC GLUCOMTR-MCNC: 219 MG/DL (ref 70–130)
GLUCOSE BLDC GLUCOMTR-MCNC: 224 MG/DL (ref 70–130)
GLUCOSE BLDC GLUCOMTR-MCNC: 225 MG/DL (ref 70–130)
GLUCOSE BLDC GLUCOMTR-MCNC: 248 MG/DL (ref 70–130)
GLUCOSE BLDC GLUCOMTR-MCNC: 258 MG/DL (ref 70–130)
GLUCOSE BLDC GLUCOMTR-MCNC: 98 MG/DL (ref 70–130)
GLUCOSE SERPL-MCNC: 126 MG/DL (ref 65–99)
GLUCOSE SERPL-MCNC: 183 MG/DL (ref 65–99)
GLUCOSE SERPL-MCNC: 223 MG/DL (ref 65–99)
HCT VFR BLD AUTO: 30.5 % (ref 34–46.6)
HGB BLD-MCNC: 10.2 G/DL (ref 12–15.9)
MAGNESIUM SERPL-MCNC: 1.7 MG/DL (ref 1.6–2.4)
MCH RBC QN AUTO: 29.6 PG (ref 26.6–33)
MCHC RBC AUTO-ENTMCNC: 33.4 G/DL (ref 31.5–35.7)
MCV RBC AUTO: 88.4 FL (ref 79–97)
PHOSPHATE SERPL-MCNC: 3 MG/DL (ref 2.5–4.5)
PHOSPHATE SERPL-MCNC: 3.3 MG/DL (ref 2.5–4.5)
PHOSPHATE SERPL-MCNC: 3.4 MG/DL (ref 2.5–4.5)
PLATELET # BLD AUTO: 320 10*3/MM3 (ref 140–450)
PMV BLD AUTO: 10.1 FL (ref 6–12)
POTASSIUM SERPL-SCNC: 3.5 MMOL/L (ref 3.5–5.2)
POTASSIUM SERPL-SCNC: 3.8 MMOL/L (ref 3.5–5.2)
POTASSIUM SERPL-SCNC: 4 MMOL/L (ref 3.5–5.2)
RBC # BLD AUTO: 3.45 10*6/MM3 (ref 3.77–5.28)
SODIUM SERPL-SCNC: 137 MMOL/L (ref 136–145)
SODIUM SERPL-SCNC: 137 MMOL/L (ref 136–145)
SODIUM SERPL-SCNC: 139 MMOL/L (ref 136–145)
WBC NRBC COR # BLD: 7.73 10*3/MM3 (ref 3.4–10.8)

## 2022-03-16 PROCEDURE — 25010000002 METOCLOPRAMIDE PER 10 MG: Performed by: INTERNAL MEDICINE

## 2022-03-16 PROCEDURE — 82962 GLUCOSE BLOOD TEST: CPT

## 2022-03-16 PROCEDURE — 83735 ASSAY OF MAGNESIUM: CPT | Performed by: NURSE PRACTITIONER

## 2022-03-16 PROCEDURE — 25010000002 HYDRALAZINE PER 20 MG: Performed by: INTERNAL MEDICINE

## 2022-03-16 PROCEDURE — 97530 THERAPEUTIC ACTIVITIES: CPT

## 2022-03-16 PROCEDURE — 0DHA8UZ INSERTION OF FEEDING DEVICE INTO JEJUNUM, VIA NATURAL OR ARTIFICIAL OPENING ENDOSCOPIC: ICD-10-PCS | Performed by: INTERNAL MEDICINE

## 2022-03-16 PROCEDURE — 43241 EGD TUBE/CATH INSERTION: CPT | Performed by: INTERNAL MEDICINE

## 2022-03-16 PROCEDURE — 97535 SELF CARE MNGMENT TRAINING: CPT

## 2022-03-16 PROCEDURE — 99232 SBSQ HOSP IP/OBS MODERATE 35: CPT | Performed by: NURSE PRACTITIONER

## 2022-03-16 PROCEDURE — 25010000002 CEFTRIAXONE PER 250 MG: Performed by: INTERNAL MEDICINE

## 2022-03-16 PROCEDURE — 74018 RADEX ABDOMEN 1 VIEW: CPT

## 2022-03-16 PROCEDURE — 85027 COMPLETE CBC AUTOMATED: CPT | Performed by: INTERNAL MEDICINE

## 2022-03-16 PROCEDURE — 83735 ASSAY OF MAGNESIUM: CPT | Performed by: INTERNAL MEDICINE

## 2022-03-16 PROCEDURE — 84100 ASSAY OF PHOSPHORUS: CPT | Performed by: INTERNAL MEDICINE

## 2022-03-16 PROCEDURE — 25010000002 PROPOFOL 10 MG/ML EMULSION: Performed by: NURSE ANESTHETIST, CERTIFIED REGISTERED

## 2022-03-16 PROCEDURE — 80048 BASIC METABOLIC PNL TOTAL CA: CPT | Performed by: INTERNAL MEDICINE

## 2022-03-16 PROCEDURE — 84100 ASSAY OF PHOSPHORUS: CPT | Performed by: NURSE PRACTITIONER

## 2022-03-16 PROCEDURE — 25010000002 LORAZEPAM PER 2 MG: Performed by: INTERNAL MEDICINE

## 2022-03-16 PROCEDURE — 80048 BASIC METABOLIC PNL TOTAL CA: CPT | Performed by: NURSE PRACTITIONER

## 2022-03-16 RX ORDER — SODIUM CHLORIDE 0.9 % (FLUSH) 0.9 %
10 SYRINGE (ML) INJECTION EVERY 12 HOURS SCHEDULED
Status: DISCONTINUED | OUTPATIENT
Start: 2022-03-16 | End: 2022-03-16 | Stop reason: HOSPADM

## 2022-03-16 RX ORDER — LABETALOL HYDROCHLORIDE 5 MG/ML
INJECTION, SOLUTION INTRAVENOUS AS NEEDED
Status: DISCONTINUED | OUTPATIENT
Start: 2022-03-16 | End: 2022-03-16 | Stop reason: SURG

## 2022-03-16 RX ORDER — MIDAZOLAM HYDROCHLORIDE 1 MG/ML
1 INJECTION INTRAMUSCULAR; INTRAVENOUS
Status: DISCONTINUED | OUTPATIENT
Start: 2022-03-16 | End: 2022-03-16 | Stop reason: HOSPADM

## 2022-03-16 RX ORDER — TRAMADOL HYDROCHLORIDE 50 MG/1
50 TABLET ORAL EVERY 6 HOURS PRN
Status: DISCONTINUED | OUTPATIENT
Start: 2022-03-16 | End: 2022-03-19 | Stop reason: HOSPADM

## 2022-03-16 RX ORDER — ASCORBIC ACID 500 MG
500 TABLET ORAL DAILY
Status: DISCONTINUED | OUTPATIENT
Start: 2022-03-17 | End: 2022-03-19 | Stop reason: HOSPADM

## 2022-03-16 RX ORDER — SODIUM CHLORIDE 0.9 % (FLUSH) 0.9 %
10 SYRINGE (ML) INJECTION AS NEEDED
Status: DISCONTINUED | OUTPATIENT
Start: 2022-03-16 | End: 2022-03-16 | Stop reason: HOSPADM

## 2022-03-16 RX ORDER — POTASSIUM CHLORIDE 7.45 MG/ML
10 INJECTION INTRAVENOUS
Status: DISCONTINUED | OUTPATIENT
Start: 2022-03-16 | End: 2022-03-18

## 2022-03-16 RX ORDER — LIDOCAINE HYDROCHLORIDE 10 MG/ML
0.5 INJECTION, SOLUTION EPIDURAL; INFILTRATION; INTRACAUDAL; PERINEURAL ONCE AS NEEDED
Status: DISCONTINUED | OUTPATIENT
Start: 2022-03-16 | End: 2022-03-16 | Stop reason: HOSPADM

## 2022-03-16 RX ORDER — IPRATROPIUM BROMIDE AND ALBUTEROL SULFATE 2.5; .5 MG/3ML; MG/3ML
3 SOLUTION RESPIRATORY (INHALATION) ONCE AS NEEDED
Status: CANCELLED | OUTPATIENT
Start: 2022-03-16

## 2022-03-16 RX ORDER — GABAPENTIN 400 MG/1
400 CAPSULE ORAL 2 TIMES DAILY PRN
Status: DISCONTINUED | OUTPATIENT
Start: 2022-03-16 | End: 2022-03-19 | Stop reason: HOSPADM

## 2022-03-16 RX ORDER — FLUOXETINE HYDROCHLORIDE 20 MG/1
20 CAPSULE ORAL DAILY
Status: DISCONTINUED | OUTPATIENT
Start: 2022-03-17 | End: 2022-03-19 | Stop reason: HOSPADM

## 2022-03-16 RX ORDER — ACETAMINOPHEN 160 MG/5ML
650 SOLUTION ORAL EVERY 4 HOURS PRN
Status: DISCONTINUED | OUTPATIENT
Start: 2022-03-16 | End: 2022-03-19 | Stop reason: HOSPADM

## 2022-03-16 RX ORDER — PROPOFOL 10 MG/ML
VIAL (ML) INTRAVENOUS AS NEEDED
Status: DISCONTINUED | OUTPATIENT
Start: 2022-03-16 | End: 2022-03-16 | Stop reason: SURG

## 2022-03-16 RX ORDER — FAMOTIDINE 20 MG/1
20 TABLET, FILM COATED ORAL ONCE
Status: DISCONTINUED | OUTPATIENT
Start: 2022-03-16 | End: 2022-03-16 | Stop reason: HOSPADM

## 2022-03-16 RX ORDER — CHOLECALCIFEROL (VITAMIN D3) 125 MCG
5 CAPSULE ORAL NIGHTLY PRN
Status: DISCONTINUED | OUTPATIENT
Start: 2022-03-16 | End: 2022-03-19 | Stop reason: HOSPADM

## 2022-03-16 RX ORDER — SODIUM CHLORIDE, SODIUM LACTATE, POTASSIUM CHLORIDE, CALCIUM CHLORIDE 600; 310; 30; 20 MG/100ML; MG/100ML; MG/100ML; MG/100ML
9 INJECTION, SOLUTION INTRAVENOUS CONTINUOUS
Status: DISCONTINUED | OUTPATIENT
Start: 2022-03-16 | End: 2022-03-16

## 2022-03-16 RX ORDER — DONEPEZIL HYDROCHLORIDE 5 MG/1
5 TABLET, FILM COATED ORAL NIGHTLY
Status: DISCONTINUED | OUTPATIENT
Start: 2022-03-16 | End: 2022-03-19 | Stop reason: HOSPADM

## 2022-03-16 RX ORDER — FAMOTIDINE 10 MG/ML
20 INJECTION, SOLUTION INTRAVENOUS ONCE
Status: COMPLETED | OUTPATIENT
Start: 2022-03-16 | End: 2022-03-16

## 2022-03-16 RX ORDER — ACETAMINOPHEN 650 MG/1
650 SUPPOSITORY RECTAL EVERY 4 HOURS PRN
Status: DISCONTINUED | OUTPATIENT
Start: 2022-03-16 | End: 2022-03-19 | Stop reason: HOSPADM

## 2022-03-16 RX ORDER — LIDOCAINE HYDROCHLORIDE 10 MG/ML
INJECTION, SOLUTION EPIDURAL; INFILTRATION; INTRACAUDAL; PERINEURAL AS NEEDED
Status: DISCONTINUED | OUTPATIENT
Start: 2022-03-16 | End: 2022-03-16 | Stop reason: SURG

## 2022-03-16 RX ORDER — ATORVASTATIN CALCIUM 40 MG/1
80 TABLET, FILM COATED ORAL NIGHTLY
Status: DISCONTINUED | OUTPATIENT
Start: 2022-03-16 | End: 2022-03-19 | Stop reason: HOSPADM

## 2022-03-16 RX ORDER — HYDRALAZINE HYDROCHLORIDE 25 MG/1
25 TABLET, FILM COATED ORAL EVERY 8 HOURS SCHEDULED
Status: DISCONTINUED | OUTPATIENT
Start: 2022-03-16 | End: 2022-03-19

## 2022-03-16 RX ORDER — HYDRALAZINE HYDROCHLORIDE 25 MG/1
25 TABLET, FILM COATED ORAL EVERY 8 HOURS SCHEDULED
Status: DISCONTINUED | OUTPATIENT
Start: 2022-03-16 | End: 2022-03-16

## 2022-03-16 RX ORDER — ONDANSETRON 2 MG/ML
4 INJECTION INTRAMUSCULAR; INTRAVENOUS ONCE AS NEEDED
Status: CANCELLED | OUTPATIENT
Start: 2022-03-16

## 2022-03-16 RX ORDER — POTASSIUM CHLORIDE 1.5 G/1.77G
40 POWDER, FOR SOLUTION ORAL AS NEEDED
Status: DISCONTINUED | OUTPATIENT
Start: 2022-03-16 | End: 2022-03-18

## 2022-03-16 RX ORDER — ASPIRIN 81 MG/1
81 TABLET, CHEWABLE ORAL DAILY
Status: DISCONTINUED | OUTPATIENT
Start: 2022-03-16 | End: 2022-03-19 | Stop reason: HOSPADM

## 2022-03-16 RX ORDER — AMLODIPINE BESYLATE 10 MG/1
10 TABLET ORAL DAILY
Status: DISCONTINUED | OUTPATIENT
Start: 2022-03-17 | End: 2022-03-19 | Stop reason: HOSPADM

## 2022-03-16 RX ADMIN — Medication 10 ML: at 22:44

## 2022-03-16 RX ADMIN — PANTOPRAZOLE SODIUM 40 MG: 40 INJECTION, POWDER, LYOPHILIZED, FOR SOLUTION INTRAVENOUS at 22:41

## 2022-03-16 RX ADMIN — HYDRALAZINE HYDROCHLORIDE 10 MG: 10 TABLET ORAL at 05:36

## 2022-03-16 RX ADMIN — ATORVASTATIN CALCIUM 80 MG: 40 TABLET, FILM COATED ORAL at 22:41

## 2022-03-16 RX ADMIN — LIDOCAINE HYDROCHLORIDE 75 MG: 10 INJECTION, SOLUTION EPIDURAL; INFILTRATION; INTRACAUDAL; PERINEURAL at 08:35

## 2022-03-16 RX ADMIN — LABETALOL 20 MG/4 ML (5 MG/ML) INTRAVENOUS SYRINGE 10 MG: at 08:44

## 2022-03-16 RX ADMIN — LORAZEPAM 0.25 MG: 2 INJECTION INTRAMUSCULAR; INTRAVENOUS at 05:50

## 2022-03-16 RX ADMIN — DONEPEZIL HYDROCHLORIDE 5 MG: 5 TABLET, FILM COATED ORAL at 22:41

## 2022-03-16 RX ADMIN — Medication 10 ML: at 09:42

## 2022-03-16 RX ADMIN — LABETALOL 20 MG/4 ML (5 MG/ML) INTRAVENOUS SYRINGE 5 MG: at 08:59

## 2022-03-16 RX ADMIN — INSULIN HUMAN 1.5 UNITS/HR: 1 INJECTION, SOLUTION INTRAVENOUS at 17:34

## 2022-03-16 RX ADMIN — POTASSIUM CHLORIDE, DEXTROSE MONOHYDRATE AND SODIUM CHLORIDE 150 ML/HR: 150; 5; 450 INJECTION, SOLUTION INTRAVENOUS at 05:50

## 2022-03-16 RX ADMIN — SODIUM CHLORIDE, POTASSIUM CHLORIDE, SODIUM LACTATE AND CALCIUM CHLORIDE 9 ML/HR: 600; 310; 30; 20 INJECTION, SOLUTION INTRAVENOUS at 08:28

## 2022-03-16 RX ADMIN — LABETALOL 20 MG/4 ML (5 MG/ML) INTRAVENOUS SYRINGE 5 MG: at 08:53

## 2022-03-16 RX ADMIN — Medication 10 ML: at 09:41

## 2022-03-16 RX ADMIN — HYDRALAZINE HYDROCHLORIDE 25 MG: 25 TABLET, FILM COATED ORAL at 14:02

## 2022-03-16 RX ADMIN — INSULIN HUMAN 0.5 UNITS/HR: 1 INJECTION, SOLUTION INTRAVENOUS at 10:40

## 2022-03-16 RX ADMIN — Medication 10 ML: at 22:42

## 2022-03-16 RX ADMIN — HYDRALAZINE HYDROCHLORIDE 10 MG: 20 INJECTION INTRAMUSCULAR; INTRAVENOUS at 08:27

## 2022-03-16 RX ADMIN — PROPOFOL 30 MG: 10 INJECTION, EMULSION INTRAVENOUS at 08:41

## 2022-03-16 RX ADMIN — SODIUM CHLORIDE 1 G: 900 INJECTION INTRAVENOUS at 01:13

## 2022-03-16 RX ADMIN — PROPOFOL 30 MG: 10 INJECTION, EMULSION INTRAVENOUS at 08:38

## 2022-03-16 RX ADMIN — ASPIRIN 81 MG 81 MG: 81 TABLET ORAL at 10:34

## 2022-03-16 RX ADMIN — PANTOPRAZOLE SODIUM 40 MG: 40 INJECTION, POWDER, LYOPHILIZED, FOR SOLUTION INTRAVENOUS at 09:40

## 2022-03-16 RX ADMIN — METOCLOPRAMIDE 5 MG: 5 INJECTION, SOLUTION INTRAMUSCULAR; INTRAVENOUS at 17:34

## 2022-03-16 RX ADMIN — PROPOFOL 20 MG: 10 INJECTION, EMULSION INTRAVENOUS at 08:36

## 2022-03-16 RX ADMIN — FAMOTIDINE 20 MG: 10 INJECTION INTRAVENOUS at 08:27

## 2022-03-16 RX ADMIN — METOCLOPRAMIDE 5 MG: 5 INJECTION, SOLUTION INTRAMUSCULAR; INTRAVENOUS at 11:43

## 2022-03-16 RX ADMIN — POTASSIUM CHLORIDE, DEXTROSE MONOHYDRATE AND SODIUM CHLORIDE 150 ML/HR: 150; 5; 450 INJECTION, SOLUTION INTRAVENOUS at 10:35

## 2022-03-16 RX ADMIN — PROPOFOL 80 MG: 10 INJECTION, EMULSION INTRAVENOUS at 08:35

## 2022-03-16 RX ADMIN — HYDRALAZINE HYDROCHLORIDE 25 MG: 25 TABLET, FILM COATED ORAL at 22:41

## 2022-03-16 RX ADMIN — Medication 5 MG: at 23:03

## 2022-03-16 RX ADMIN — METOCLOPRAMIDE 5 MG: 5 INJECTION, SOLUTION INTRAMUSCULAR; INTRAVENOUS at 01:13

## 2022-03-16 RX ADMIN — TERAZOSIN HYDROCHLORIDE 1 MG: 1 CAPSULE ORAL at 22:41

## 2022-03-16 RX ADMIN — METOCLOPRAMIDE 5 MG: 5 INJECTION, SOLUTION INTRAMUSCULAR; INTRAVENOUS at 05:35

## 2022-03-16 NOTE — PROGRESS NOTES
[facility] Medicine Services  PROGRESS NOTE    Patient Name: Thelma Michael  : 1960  MRN: [id number]    Date of Admission: 3/12/2022  Primary Care Physician: Monet Howe APRN    Subjective   Subjective     CC:  N/V, DKA     HPI:  Saw patient prior and after procedure. She denied any nausea or abd pain. Tolerated procedure. Talked with nurse, pharmacy and RD about Tf and insulin drip     ROS:  Gen- No fevers, chills  CV- No chest pain, palpitations  Resp- No cough, dyspnea  GI- + N/V/D, abd pain         Objective   Objective     Vital Signs:   Temp:  [97.1 °F (36.2 °C)-99.4 °F (37.4 °C)] 97.1 °F (36.2 °C)  Heart Rate:  [] 92  Resp:  [16] 16  BP: (149-218)/() 149/102  Flow (L/min):  [3] 3     Physical Exam:  Constitutional: No acute distress, awake, alert, frail   HENT: NCAT, mucous membranes moist  Respiratory: Clear to auscultation bilaterally, respiratory effort normal room air 100%  Cardiovascular: RRR, no murmurs, rubs, or gallops  Gastrointestinal: Positive bowel sounds, soft, nontender, nondistended  Musculoskeletal: No bilateral ankle edema  Psychiatric: Appropriate affect, cooperative  Neurologic: Oriented x 3, strength symmetric in all extremities, Cranial Nerves grossly intact to confrontation, speech clear  Skin: No rashes, right chest deep line,       Results Reviewed:  LAB RESULTS:      Lab 22  0357 03/15/22  1108 22  0343 22  0558 22  0101 22  0058   WBC 7.73 7.83 8.20 12.31*  --  8.65   HEMOGLOBIN 10.2* 10.6* 10.5* 11.7*  --  13.3   HEMATOCRIT 30.5* 31.3* 31.9* 34.2  --  38.4   PLATELETS 320 362 335 433  --  519*   NEUTROS ABS  --  5.58 5.75 11.64*  --  7.35*   IMMATURE GRANS (ABS)  --  0.04 0.03 0.04  --  0.04   LYMPHS ABS  --  1.60 1.80 0.49*  --  1.00   MONOS ABS  --  0.57 0.61 0.12  --  0.23   EOS ABS  --  0.02 0.00 0.00  --  0.01   MCV 88.4 87.4 91.9 88.4  --  86.7   PROCALCITONIN  --   --   --   --   0.07  --    LACTATE  --   --   --  3.3*  --  3.5*         Lab 03/16/22  0357 03/15/22  1459 03/15/22  1108 03/15/22  0745 03/14/22  2359 03/13/22  0101 03/13/22  0058   SODIUM 137 134* 135* 136 139   < >  --    POTASSIUM 3.8 3.5 3.7 4.4 3.7   < >  --    CHLORIDE 108* 104 106 107 107   < >  --    CO2 21.0* 17.0* 17.0* 16.0* 18.0*   < >  --    ANION GAP 8.0 13.0 12.0 13.0 14.0   < >  --    BUN 3* 3* 3* 4* 4*   < >  --    CREATININE 0.68 0.76 0.74 0.81 0.72   < >  --    EGFR 98.0 88.2 91.0 81.7 94.1   < >  --    GLUCOSE 126* 201* 195* 160* 163*   < >  --    CALCIUM 8.4* 8.5* 8.5* 8.6 8.5*   < >  --    MAGNESIUM 1.7 1.8 1.8 1.9 1.9   < >  --    PHOSPHORUS 3.0 2.5 2.8 2.7 3.4   < >  --    HEMOGLOBIN A1C  --   --   --   --   --   --  8.40*    < > = values in this interval not displayed.         Lab 03/13/22  0558 03/13/22  0101   TOTAL PROTEIN 7.1 7.8   ALBUMIN 4.20 4.50   GLOBULIN 2.9 3.3   ALT (SGPT) 16 17   AST (SGOT) 30 17   BILIRUBIN 0.6 0.7   ALK PHOS 84 91   LIPASE  --  25         Lab 03/13/22  0101   TROPONIN T 0.012                 Lab 03/13/22  0516   PH, ARTERIAL 7.489*   PCO2, ARTERIAL 26.6*   PO2 ART 99.9   FIO2 21   HCO3 ART 20.2   BASE EXCESS ART -1.9*   CARBOXYHEMOGLOBIN 0.7     Brief Urine Lab Results  (Last result in the past 365 days)      Color   Clarity   Blood   Leuk Est   Nitrite   Protein   CREAT   Urine HCG        03/13/22 0121 Yellow   Cloudy   Trace   Negative   Negative   >=300 mg/dL (3+)                 Microbiology Results Abnormal     Procedure Component Value - Date/Time    Blood Culture - Blood, Arm, Right [[id number]]  (Normal) Collected: 03/13/22 0600    Lab Status: Preliminary result Specimen: Blood from Arm, Right Updated: 03/16/22 0647     Blood Culture No growth at 3 days    Blood Culture - Blood, Arm, Left [[id number]]  (Normal) Collected: 03/13/22 0540    Lab Status: Preliminary result Specimen: Blood from Arm, Left Updated: 03/16/22 0646     Blood Culture No growth at 3 days     Urine Culture - Urine, Urine, Clean Catch [[id number]] Collected: 03/13/22 0121    Lab Status: Final result Specimen: Urine, Clean Catch Updated: 03/14/22 1012     Urine Culture 50,000 CFU/mL Mixed Sameera Isolated    Narrative:      Specimen contains mixed organisms of questionable pathogenicity which indicates contamination with commensal sameera.  Further identification is unlikely to provide clinically useful information.  Suggest recollection.    Respiratory Panel PCR w/COVID-19(SARS-CoV-2) [facility] In-House, NP Swab in UTM/VTM, 3-4 HR TAT - Swab, Nasopharynx [[id number]]  (Normal) Collected: 03/13/22 0639    Lab Status: Final result Specimen: Swab from Nasopharynx Updated: 03/13/22 0739     ADENOVIRUS, PCR Not Detected     Coronavirus 229E Not Detected     Coronavirus HKU1 Not Detected     Coronavirus NL63 Not Detected     Coronavirus OC43 Not Detected     COVID19 Not Detected     Human Metapneumovirus Not Detected     Human Rhinovirus/Enterovirus Not Detected     Influenza A PCR Not Detected     Influenza B PCR Not Detected     Parainfluenza Virus 1 Not Detected     Parainfluenza Virus 2 Not Detected     Parainfluenza Virus 3 Not Detected     Parainfluenza Virus 4 Not Detected     RSV, PCR Not Detected     Bordetella pertussis pcr Not Detected     Bordetella parapertussis PCR Not Detected     Chlamydophila pneumoniae PCR Not Detected     Mycoplasma pneumo by PCR Not Detected    Narrative:      In the setting of a positive respiratory panel with a viral infection PLUS a negative procalcitonin without other underlying concern for bacterial infection, consider observing off antibiotics or discontinuation of antibiotics and continue supportive care. If the respiratory panel is positive for atypical bacterial infection (Bordetella pertussis, Chlamydophila pneumoniae, or Mycoplasma pneumoniae), consider antibiotic de-escalation to target atypical bacterial infection.          XR Chest 1 View    Result  Date: 3/15/2022  DATE OF EXAM: 3/15/2022 10:19 PM  PROCEDURE: XR CHEST 1 VW-  INDICATIONS: cvc placement; E10.10-Type 1 diabetes mellitus with ketoacidosis without coma; R11.2-Nausea with vomiting, unspecified; K31.84-Gastroparesis  COMPARISON: 3/13/2022  TECHNIQUE: Single radiographic AP view of the chest was obtained.  FINDINGS: Right-sided IJ line terminates in the SVC. There is no pneumothorax. No new focal airspace opacity. No significant pleural effusion. Unchanged heart and mediastinal contours.      Impression: Right-sided IJ line terminates in the SVC. There is no pneumothorax.  This report was finalized on 3/15/2022 10:29 PM by Zana Muñoz.      XR Abdomen KUB    Result Date: 3/16/2022  DATE OF EXAM: 3/16/2022 8:41 AM  PROCEDURE: XR ABDOMEN KUB-  INDICATIONS: nj tube placement; E10.10-Type 1 diabetes mellitus with ketoacidosis without coma; R11.2-Nausea with vomiting, unspecified; K31.84-Gastroparesis  COMPARISON: No comparisons available.  TECHNIQUE: Single radiographic view of the abdomen was obtained.  FINDINGS: There is a postpyloric feeding tube with the tip in the region of the ligament of Treitz. The visualized bowel gas pattern is normal      Impression: Post pyloric feeding tube tip at the ligament of Treitz  This report was finalized on 3/16/2022 9:05 AM by Joey Thorpe.        Results for orders placed during the hospital encounter of 11/19/19    Adult Transesophageal Echo (LIZANDRO) W/ Cont if Necessary Per Protocol    Interpretation Summary  · Left ventricular systolic function is normal. Estimated EF = 65%.  · Left ventricular wall thickness is consistent with hypertrophy.  · Small patent foramen ovale present. Saline test results are positive with valsalva manuever.  · There is mild mitral valve prolapse of the anterior mitral leaflet.  · Mild tricuspid valve regurgitation is present.  · Estimated right ventricular systolic pressure from tricuspid regurgitation is mildly elevated (35-45  mmHg).  · There is mild plaque in the descending aorta present.      I have reviewed the medications:  Scheduled Meds:Pharmacy Consult, , Does not apply, BID  [START ON 3/17/2022] amLODIPine, 10 mg, Nasogastric, Daily  [START ON 3/17/2022] ascorbic acid, 500 mg, Nasogastric, Daily  aspirin, 81 mg, Nasogastric, Daily  atorvastatin, 80 mg, Nasogastric, Nightly  cefTRIAXone, 1 g, Intravenous, Q24H  cholecalciferol, 1,000 Units, Oral, Daily  donepezil, 5 mg, Nasogastric, Nightly  [START ON 3/17/2022] FLUoxetine, 20 mg, Nasogastric, Daily  hydrALAZINE, 25 mg, Nasogastric, Q8H  metoclopramide, 5 mg, Intravenous, Q6H  multivitamin with minerals, 1 tablet, Oral, Daily  pantoprazole, 40 mg, Intravenous, Q12H  sodium chloride, 10 mL, Intravenous, Q12H  sodium chloride, 10 mL, Intravenous, Q12H  sodium chloride, 10 mL, Intravenous, Q12H  terazosin, 1 mg, Oral, Nightly      Continuous Infusions:dextrose 5 % and sodium chloride 0.45 %, 150 mL/hr  dextrose 5 % and sodium chloride 0.45 % with KCl 20 mEq/L, 150 mL/hr, Last Rate: 150 mL/hr (03/16/22 1035)  dextrose 5 % and sodium chloride 0.45 % with KCl 40 mEq/L, 150 mL/hr  dextrose 5 % and sodium chloride 0.9 %, 150 mL/hr  dextrose 5 % and sodium chloride 0.9 % with KCl 20 mEq, 150 mL/hr  dextrose 5% and sodium chloride 0.9% with KCl 40 mEq/L, 150 mL/hr  insulin, 5 Units/hr, Last Rate: 0.5 Units/hr (03/16/22 1040)  niCARdipine, 5-15 mg/hr, Last Rate: Stopped (03/14/22 1638)  sodium chloride, 10 mL/hr      PRN Meds:.[DISCONTINUED] acetaminophen **OR** acetaminophen **OR** acetaminophen  •  albuterol  •  calcium carbonate  •  dextrose  •  dextrose 5 % and sodium chloride 0.45 %  •  dextrose 5 % and sodium chloride 0.45 % with KCl 20 mEq/L  •  dextrose 5 % and sodium chloride 0.45 % with KCl 40 mEq/L  •  dextrose 5 % and sodium chloride 0.9 %  •  dextrose 5 % and sodium chloride 0.9 % with KCl 20 mEq  •  dextrose 5% and sodium chloride 0.9% with KCl 40 mEq/L  •  gabapentin  •   hydrALAZINE  •  LORazepam  •  magnesium sulfate **OR** magnesium sulfate **OR** magnesium sulfate  •  melatonin  •  [DISCONTINUED] potassium chloride **OR** potassium chloride **OR** potassium chloride  •  potassium phosphate infusion greater than 15 mMoles **OR** potassium phosphate infusion greater than 15 mMoles **OR** potassium phosphate **OR** sodium phosphate IVPB **OR** sodium phosphate IVPB **OR** sodium phosphate IVPB  •  Sodium Chloride (PF)  •  sodium chloride  •  traMADol    Assessment/Plan   Assessment & Plan     Active Hospital Problems    Diagnosis  POA   • Type 1 diabetes mellitus with ketoacidosis without coma (HCC) [E10.10]  Yes   • Bacteriuria [R82.71]  Yes   • Gastroparesis [K31.84]  Yes   • Diabetic ketoacidosis without coma associated with type 1 diabetes mellitus (HCC) [E10.10]  Yes   • Hypertensive urgency [I16.0]  Yes   • Gastroesophageal reflux disease [K21.9]  Yes   • Hyperlipidemia [E78.5]  Yes      Resolved Hospital Problems   No resolved problems to display.        Brief Hospital Course to date:  Thelma Michael is a 61 y.o. female presents to the ED with with Nausea/vomiting/diarrhea and abdominal pain that began yesterday.     Plan was partially entered by my partner and I have reviewed and updated as appropriate on 3/16/22     DKA  Type 1 Diabetes   -anion gap: 23.0, glucose 316, ABG noted above, UA: glucose and ketones present  - gap closed but continue insulin gtt as unable to tolerate PO intake    -hgb A1c 8.40  -- will continue drip until pt is tolerating TF, if tolerating plan to transition over to levemir 10 units bid and mod dose SSI     N/v  - Has been unable to tolerate PO intake. Per patient, feels similar to gastroparesis   - GI consulted -- EGD with endoscopic placement of nasojejunal tube 3/16  - IV PPI; continue reglan   -- RD following and will start TF 3/16     Hypokalemia   - continue to replace      Hypertensive urgency   - /107  - Continue home meds -  amlodipine and hytrin (on cardura at home)  - increase hydralazine 25 mg TID and now weaned off cardene gtt       Bacteriuria   - UA noted above   - UCx mixed sameera  - Continue rocephin x 5 days      Fever - improved   - UTI being treated; BCx NGTD  - CXR with no acute process   - Monitor for additional diarrhea     Diarrhea - improved   - No recent antibiotic use   -CT A/P no acute process   - Obtain GI PCR if has recurrence      Gastroparesis   - follows with GI  - getting referral to [facility] gastrointestinal motility clinic   - resume reglan      GERD  -PPI      Prolonged QT   - improved; limit contributing medications        DVT prophylaxis:  Mechanical DVT prophylaxis orders are present.       AM-PAC 6 Clicks Score (PT): 18 (03/15/22 0800)    Disposition: I expect the patient to be discharged TBD.    CODE STATUS:   Code Status and Medical Interventions:   Ordered at: 03/13/22 0617     Level Of Support Discussed With:    Patient     Code Status (Patient has no pulse and is not breathing):    CPR (Attempt to Resuscitate)     Medical Interventions (Patient has pulse or is breathing):    Full Support       Livier Jerome, APRN  03/16/22

## 2022-03-16 NOTE — CONSULTS
Clinical Nutrition   Reason For Visit: Physician consult, EN    Patient Name: Thelma Michael  YOB: 1960  MRN: [id number]  Date of Encounter: 03/16/22 10:09 EDT  Admission date: 3/12/2022    Nutrition consult received for tube feeding assessment, NJ placed this am.    Pt day 4 NPO/clears and unable to tolerate PO for several days prior to admission, at risk refeeding syndrome. Advance EN regimen slow and low as tolerated/as ordered. Monitor electrolytes closely and replete as needed. Continue to monitor BG closely as well.    Recommend initiating continuous EN regimen of Peptamen AF at 15 ml/hr and advance by 10 ml/hr q 8 hr as tolerated to goal rate of 55 ml/hr. If no significant IVF, FW @ 20 ml/hr.    Nutrition Assessment     Admission Problem List:    Gastroesophageal reflux disease    Hyperlipidemia    Hypertensive urgency    Diabetic ketoacidosis without coma associated with type 1 diabetes mellitus (HCC)    Gastroparesis    Type 1 diabetes mellitus with ketoacidosis without coma (HCC)    Bacteriuria       Applicable PMH:      Applicable Nutrition-Related Information:      Applicable medical tests/procedures since admission:  3/16) EGD- normal  3/16) abd. KUB   IMPRESSION:  Post pyloric feeding tube tip at the ligament of Treitz      PMH: She  has a past medical history of Acid reflux, Acute bronchitis, Cardiac murmur, Diabetes mellitus (HCC), H/O echocardiogram (08/07/2012), History of nuclear stress test (08/22/2014), Hyperlipidemia, Hypertension, Impacted cerumen of both ears, Migraine, Self-catheterizes urinary bladder, Sinusitis, Stroke (HCC), Tobacco abuse, and Urticaria.   PSxH: She  has a past surgical history that includes No past surgeries; Dental surgery; Colonoscopy; Esophagogastroduodenoscopy (N/A, 6/30/2021); Colonoscopy (N/A, 7/27/2021); Esophagogastroduodenoscopy (N/A, 7/27/2021); and Capsule Endoscopy (7/27/2021).        Reported/Observed/Food/Nutrition Related History      Pt admit with DKA 3/12, has been unable to tolerate PO intake since, several days minimal PO intake prior to admission as well. Pt also with gastroparesis and states her symptoms are similar to that of flare ups in the past. EGD this morning was normal, NJ placed.    Anthropometrics   Height: 68 in  Weight: 121 lb per bed scale 3/13  BMI: 19.4  BMI classification: Normal: 18.5-24.9kg/m2   IBW: 140 lb    UBW: ~125 lb, Per EMR:  (5/21) 126 lb   (7/21) 123 lb  (1/22) 125 lb  (2/22) 121 lb     Weight change:     *RD notes wide variation in bed weights since admission of 119-133 lb. Suspect admission bed weight most accurate, will continue to monitor.     Nutrition Focused Physical Exam     Deferred at this time, will f/u as able    Labs reviewed   Labs reviewed: Yes    Results from last 7 days   Lab Units 03/16/22  0357 03/15/22  1459 03/15/22  1108   SODIUM mmol/L 137 134* 135*   POTASSIUM mmol/L 3.8 3.5 3.7   CHLORIDE mmol/L 108* 104 106   CO2 mmol/L 21.0* 17.0* 17.0*   BUN mg/dL 3* 3* 3*   CREATININE mg/dL 0.68 0.76 0.74   GLUCOSE mg/dL 126* 201* 195*   CALCIUM mg/dL 8.4* 8.5* 8.5*   PHOSPHORUS mg/dL 3.0 2.5 2.8   MAGNESIUM mg/dL 1.7 1.8 1.8     Results from last 7 days   Lab Units 03/16/22  0357 03/15/22  1108 03/14/22  0343 03/13/22  0558 03/13/22  0101   WBC 10*3/mm3 7.73 7.83 8.20 12.31*  --    ALBUMIN g/dL  --   --   --  4.20 4.50     Results from last 7 days   Lab Units 03/16/22  0932 03/16/22  0704 03/16/22  0627 03/16/22  0544 03/16/22  0418 03/16/22  0305   GLUCOSE mg/dL 202* 219* 204* 191* 139* 98     Lab Results   Lab Value Date/Time    HGBA1C 8.40 (H) 03/13/2022 0058    HGBA1C 10.30 (H) 02/11/2022 0814     Medications reviewed   Medications reviewed: Yes  Scheduled: abx, vit c, d3, mvi, reglan  GTT: insulin drip, cardene drip  PRN: D5W, electrolyte replacements    Needs Assessment   Height used: 68 in  Weight used: 121 lb / 55 kg  IBW: 140 lb    Estimated Calories needs: ~1450 kcal /day  1153 x 1.2 =  1384 kcal  25-27 kcal/kg = 7154-2132 kcal    Estimated Protein needs: ~83 g protein / day  1.2-1.5 g/kg = 66-83    Estimated Fluid needs: ~1375 ml / day  1 ml/kcal    Current Nutrition Prescription   PO: NPO Diet  Oral Nutrition Supplement: Orders Placed This Encounter      Diet, Tube Feeding Tube Feeding Formula: Per RD       EN: Not yet started   Diet, Tube Feeding Tube Feeding Formula: Per RD  Route: NJ  Verified at bedside: No      Average PO intake: none documented, very minimal per RN      EN delivery:  n/a       Nutrition Diagnosis     Problem Inadequate oral intake   Etiology Altered GI function   Signs/Symptoms NPO/clear liquid diet x 4 days, minimal intake several days prior       Problem Altered GI function   Etiology DKA / Gastroparesis    Signs/Symptoms N/V/inability to tolerate PO intake 4+ days       Goal:   General: Nutrition support treatment, Nutrition to support treatment  PO: Tolerate PO, Advace diet as medically appropriate   EN/PN: Initiate EN      Intervention   Intervention: Follow treatment progress, Care plan reviewed, Nutrition support order placed    Recommend initiating continuous EN regimen of Peptamen AF at 15 ml/hr and advance by 10 ml/hr q 8 hr as tolerated to goal rate of 55 ml/hr. If no significant IVF, FW @ 20 ml/hr.    At goal this regimen provides:  1210 ml / 1452 kcal (100% needs)  92 g protein (110% needs)  136 g cho (44% total daily kcal)  980 ml FW in formula (+440 ml FW flushes = 1420 total fluids)    Monitoring/Evaluation:   Monitoring/Evaluation: Per protocol, I&O, PO intake, Supplement intake, Pertinent labs, EN delivery/tolerance, Weight, Skin status, GI status, Symptoms, POC/GOC, Hemodynamic stability    Kylah Gonzalez RD  Time Spent: 50

## 2022-03-16 NOTE — PLAN OF CARE
Goal Outcome Evaluation:  Plan of Care Reviewed With: patient        Progress: improving  Outcome Evaluation: Pt with improved functional transfers, ambulation, and participation in ADLs this session, however continues to be limited by activity tolerance and generalized weakness. Pt performed STS using RW with CGA during toileting tasks. Pt then ambulated ~100 ft (household distance) using RW with CGA requiring cues for upright posture. OT continues to rec IPR at dc in order to return to PLOF.

## 2022-03-16 NOTE — ANESTHESIA PREPROCEDURE EVALUATION
Anesthesia Evaluation     Patient summary reviewed and Nursing notes reviewed   no history of anesthetic complications:  NPO Solid Status: > 8 hours  NPO Liquid Status: > 2 hours           Airway   Mallampati: II  TM distance: >3 FB  Neck ROM: full  No difficulty expected  Dental - normal exam     Pulmonary - normal exam    breath sounds clear to auscultation  (+) a smoker (quit x 3 yrs) Former,   Cardiovascular - normal exam    ECG reviewed  Rhythm: regular  Rate: normal    (+) hypertension,     ROS comment: 11/19 Echo:  · Left ventricular systolic function is normal. Estimated EF = 65%.  · Left ventricular wall thickness is consistent with hypertrophy.  · Small patent foramen ovale present. Saline test results are positive with valsalva manuever.  · There is mild mitral valve prolapse of the anterior mitral leaflet.  · Mild tricuspid valve regurgitation is present.  · Estimated right ventricular systolic pressure from tricuspid regurgitation is mildly elevated (35-45 mmHg).  · There is mild plaque in the descending aorta present.    Neuro/Psych- negative ROS  GI/Hepatic/Renal/Endo    (+)  GERD,  diabetes mellitus (Admitted for DKA) poorly controlled,     ROS Comment: Gastroparesis    Musculoskeletal     (+) back pain,   Abdominal    Substance History      OB/GYN          Other                        Anesthesia Plan    ASA 3     general     intravenous induction     Anesthetic plan, all risks, benefits, and alternatives have been provided, discussed and informed consent has been obtained with: patient.    Plan discussed with CRNA.        CODE STATUS:    Level Of Support Discussed With: Patient  Code Status (Patient has no pulse and is not breathing): CPR (Attempt to Resuscitate)  Medical Interventions (Patient has pulse or is breathing): Full Support

## 2022-03-16 NOTE — ANESTHESIA POSTPROCEDURE EVALUATION
Patient: Thelma Michael    Procedure Summary     Date: 03/16/22 Room / Location:  [facility]    Anesthesia Start: 0830 Anesthesia Stop: 0857    Procedure: ESOPHAGOGASTRODUODENOSCOPY WITH JEJUNAL TUBE INSERTION (N/A ) Diagnosis:       Gastroparesis      (Gastroparesis [K31.84])    Surgeons: Thom Glover MD Provider: Markus Elmore MD    Anesthesia Type: general ASA Status: 3          Anesthesia Type: general    Vitals  Vitals Value Taken Time   /73 03/16/22 0855   Temp 97.1 °F (36.2 °C) 03/16/22 0855   Pulse 87 03/16/22 0859   Resp 16 03/16/22 0855   SpO2 99 % 03/16/22 0859   Vitals shown include unvalidated device data.        Post Anesthesia Care and Evaluation    Patient location during evaluation: PACU  Patient participation: complete - patient participated  Level of consciousness: awake and alert  Pain management: adequate  Airway patency: patent  Anesthetic complications: No anesthetic complications  PONV Status: none  Cardiovascular status: hemodynamically stable and acceptable  Respiratory status: nonlabored ventilation, acceptable and nasal cannula  Hydration status: acceptable

## 2022-03-16 NOTE — CASE MANAGEMENT/SOCIAL WORK
Continued Stay Note  [facility]     Patient Name: Thelma Michael  MRN: [id number]  Today's Date: 3/16/2022    Admit Date: 3/12/2022     Discharge Plan     Row Name 03/16/22 1550       Plan    Plan Comments I met with Mrs. Michael at the bedside to discuss discharge disposition. She lives with her son in [place]. However she reports that he will be out of town for 6 weeks. She does have two daughter locally, however they are employed and won't be able to assist her. Therapy is recommending inpatient rehab. Her insurance does not have skilled benefits so her only options would be acute rehab at [facility]. Mrs. Michael verbalizes agreement if this is needed. Case management will continue to follow her progress.    Final Discharge Disposition Code 30 - still a patient               Discharge Codes    No documentation.                     Christian Manzo RN

## 2022-03-17 LAB
ANION GAP SERPL CALCULATED.3IONS-SCNC: 10 MMOL/L (ref 5–15)
ANION GAP SERPL CALCULATED.3IONS-SCNC: 10 MMOL/L (ref 5–15)
ANION GAP SERPL CALCULATED.3IONS-SCNC: 11 MMOL/L (ref 5–15)
ANION GAP SERPL CALCULATED.3IONS-SCNC: 8 MMOL/L (ref 5–15)
BUN SERPL-MCNC: 4 MG/DL (ref 8–23)
BUN SERPL-MCNC: 4 MG/DL (ref 8–23)
BUN SERPL-MCNC: 5 MG/DL (ref 8–23)
BUN SERPL-MCNC: 7 MG/DL (ref 8–23)
BUN/CREAT SERPL: 4.5 (ref 7–25)
BUN/CREAT SERPL: 4.9 (ref 7–25)
BUN/CREAT SERPL: 5.9 (ref 7–25)
BUN/CREAT SERPL: 7.4 (ref 7–25)
CALCIUM SPEC-SCNC: 8.3 MG/DL (ref 8.6–10.5)
CALCIUM SPEC-SCNC: 8.4 MG/DL (ref 8.6–10.5)
CALCIUM SPEC-SCNC: 8.8 MG/DL (ref 8.6–10.5)
CALCIUM SPEC-SCNC: 8.9 MG/DL (ref 8.6–10.5)
CHLORIDE SERPL-SCNC: 106 MMOL/L (ref 98–107)
CHLORIDE SERPL-SCNC: 107 MMOL/L (ref 98–107)
CHLORIDE SERPL-SCNC: 108 MMOL/L (ref 98–107)
CHLORIDE SERPL-SCNC: 109 MMOL/L (ref 98–107)
CO2 SERPL-SCNC: 20 MMOL/L (ref 22–29)
CO2 SERPL-SCNC: 21 MMOL/L (ref 22–29)
CO2 SERPL-SCNC: 21 MMOL/L (ref 22–29)
CO2 SERPL-SCNC: 23 MMOL/L (ref 22–29)
CREAT SERPL-MCNC: 0.81 MG/DL (ref 0.57–1)
CREAT SERPL-MCNC: 0.85 MG/DL (ref 0.57–1)
CREAT SERPL-MCNC: 0.89 MG/DL (ref 0.57–1)
CREAT SERPL-MCNC: 0.95 MG/DL (ref 0.57–1)
EGFRCR SERPLBLD CKD-EPI 2021: 67.5 ML/MIN/1.73
EGFRCR SERPLBLD CKD-EPI 2021: 73 ML/MIN/1.73
EGFRCR SERPLBLD CKD-EPI 2021: 77.1 ML/MIN/1.73
EGFRCR SERPLBLD CKD-EPI 2021: 81.7 ML/MIN/1.73
GLUCOSE BLDC GLUCOMTR-MCNC: 109 MG/DL (ref 70–130)
GLUCOSE BLDC GLUCOMTR-MCNC: 113 MG/DL (ref 70–130)
GLUCOSE BLDC GLUCOMTR-MCNC: 120 MG/DL (ref 70–130)
GLUCOSE BLDC GLUCOMTR-MCNC: 123 MG/DL (ref 70–130)
GLUCOSE BLDC GLUCOMTR-MCNC: 125 MG/DL (ref 70–130)
GLUCOSE BLDC GLUCOMTR-MCNC: 136 MG/DL (ref 70–130)
GLUCOSE BLDC GLUCOMTR-MCNC: 174 MG/DL (ref 70–130)
GLUCOSE BLDC GLUCOMTR-MCNC: 184 MG/DL (ref 70–130)
GLUCOSE BLDC GLUCOMTR-MCNC: 205 MG/DL (ref 70–130)
GLUCOSE BLDC GLUCOMTR-MCNC: 222 MG/DL (ref 70–130)
GLUCOSE BLDC GLUCOMTR-MCNC: 241 MG/DL (ref 70–130)
GLUCOSE BLDC GLUCOMTR-MCNC: 254 MG/DL (ref 70–130)
GLUCOSE SERPL-MCNC: 147 MG/DL (ref 65–99)
GLUCOSE SERPL-MCNC: 191 MG/DL (ref 65–99)
GLUCOSE SERPL-MCNC: 202 MG/DL (ref 65–99)
GLUCOSE SERPL-MCNC: 206 MG/DL (ref 65–99)
MAGNESIUM SERPL-MCNC: 1.6 MG/DL (ref 1.6–2.4)
MAGNESIUM SERPL-MCNC: 2.3 MG/DL (ref 1.6–2.4)
MAGNESIUM SERPL-MCNC: 2.4 MG/DL (ref 1.6–2.4)
MAGNESIUM SERPL-MCNC: 2.7 MG/DL (ref 1.6–2.4)
PHOSPHATE SERPL-MCNC: 2.9 MG/DL (ref 2.5–4.5)
PHOSPHATE SERPL-MCNC: 3.1 MG/DL (ref 2.5–4.5)
PHOSPHATE SERPL-MCNC: 3.2 MG/DL (ref 2.5–4.5)
PHOSPHATE SERPL-MCNC: 3.5 MG/DL (ref 2.5–4.5)
POTASSIUM SERPL-SCNC: 3.7 MMOL/L (ref 3.5–5.2)
POTASSIUM SERPL-SCNC: 3.8 MMOL/L (ref 3.5–5.2)
POTASSIUM SERPL-SCNC: 3.9 MMOL/L (ref 3.5–5.2)
POTASSIUM SERPL-SCNC: 3.9 MMOL/L (ref 3.5–5.2)
SODIUM SERPL-SCNC: 138 MMOL/L (ref 136–145)
SODIUM SERPL-SCNC: 140 MMOL/L (ref 136–145)

## 2022-03-17 PROCEDURE — 63710000001 INSULIN DETEMIR PER 5 UNITS: Performed by: NURSE PRACTITIONER

## 2022-03-17 PROCEDURE — 99232 SBSQ HOSP IP/OBS MODERATE 35: CPT | Performed by: NURSE PRACTITIONER

## 2022-03-17 PROCEDURE — 97116 GAIT TRAINING THERAPY: CPT

## 2022-03-17 PROCEDURE — 25010000002 METOCLOPRAMIDE PER 10 MG: Performed by: INTERNAL MEDICINE

## 2022-03-17 PROCEDURE — 84100 ASSAY OF PHOSPHORUS: CPT | Performed by: INTERNAL MEDICINE

## 2022-03-17 PROCEDURE — 25010000002 CEFTRIAXONE PER 250 MG: Performed by: INTERNAL MEDICINE

## 2022-03-17 PROCEDURE — 80048 BASIC METABOLIC PNL TOTAL CA: CPT | Performed by: INTERNAL MEDICINE

## 2022-03-17 PROCEDURE — 63710000001 INSULIN REGULAR HUMAN PER 5 UNITS: Performed by: NURSE PRACTITIONER

## 2022-03-17 PROCEDURE — 0 MAGNESIUM SULFATE 4 GM/100ML SOLUTION: Performed by: INTERNAL MEDICINE

## 2022-03-17 PROCEDURE — 97110 THERAPEUTIC EXERCISES: CPT

## 2022-03-17 PROCEDURE — 82962 GLUCOSE BLOOD TEST: CPT

## 2022-03-17 PROCEDURE — 83735 ASSAY OF MAGNESIUM: CPT | Performed by: INTERNAL MEDICINE

## 2022-03-17 RX ORDER — NICOTINE POLACRILEX 4 MG
15 LOZENGE BUCCAL
Status: DISCONTINUED | OUTPATIENT
Start: 2022-03-17 | End: 2022-03-19 | Stop reason: HOSPADM

## 2022-03-17 RX ORDER — DEXTROSE MONOHYDRATE 25 G/50ML
25 INJECTION, SOLUTION INTRAVENOUS
Status: DISCONTINUED | OUTPATIENT
Start: 2022-03-17 | End: 2022-03-19 | Stop reason: HOSPADM

## 2022-03-17 RX ADMIN — Medication 10 ML: at 22:59

## 2022-03-17 RX ADMIN — Medication 10 ML: at 22:58

## 2022-03-17 RX ADMIN — METOCLOPRAMIDE 5 MG: 5 INJECTION, SOLUTION INTRAMUSCULAR; INTRAVENOUS at 06:04

## 2022-03-17 RX ADMIN — METOCLOPRAMIDE 5 MG: 5 INJECTION, SOLUTION INTRAMUSCULAR; INTRAVENOUS at 13:31

## 2022-03-17 RX ADMIN — HYDRALAZINE HYDROCHLORIDE 25 MG: 25 TABLET, FILM COATED ORAL at 13:31

## 2022-03-17 RX ADMIN — SODIUM CHLORIDE 1 G: 900 INJECTION INTRAVENOUS at 01:03

## 2022-03-17 RX ADMIN — AMLODIPINE BESYLATE 10 MG: 10 TABLET ORAL at 09:58

## 2022-03-17 RX ADMIN — METOCLOPRAMIDE 5 MG: 5 INJECTION, SOLUTION INTRAMUSCULAR; INTRAVENOUS at 18:09

## 2022-03-17 RX ADMIN — Medication 1 TABLET: at 09:58

## 2022-03-17 RX ADMIN — DONEPEZIL HYDROCHLORIDE 5 MG: 5 TABLET, FILM COATED ORAL at 22:56

## 2022-03-17 RX ADMIN — FLUOXETINE HYDROCHLORIDE 20 MG: 20 CAPSULE ORAL at 09:58

## 2022-03-17 RX ADMIN — ATORVASTATIN CALCIUM 80 MG: 40 TABLET, FILM COATED ORAL at 22:55

## 2022-03-17 RX ADMIN — TERAZOSIN HYDROCHLORIDE 1 MG: 1 CAPSULE ORAL at 22:55

## 2022-03-17 RX ADMIN — Medication 10 ML: at 09:59

## 2022-03-17 RX ADMIN — ASPIRIN 81 MG 81 MG: 81 TABLET ORAL at 09:58

## 2022-03-17 RX ADMIN — HYDRALAZINE HYDROCHLORIDE 25 MG: 25 TABLET, FILM COATED ORAL at 22:55

## 2022-03-17 RX ADMIN — TRAMADOL HYDROCHLORIDE 50 MG: 50 TABLET, COATED ORAL at 22:56

## 2022-03-17 RX ADMIN — METOCLOPRAMIDE 5 MG: 5 INJECTION, SOLUTION INTRAMUSCULAR; INTRAVENOUS at 01:03

## 2022-03-17 RX ADMIN — INSULIN HUMAN 3 UNITS: 100 INJECTION, SOLUTION PARENTERAL at 13:31

## 2022-03-17 RX ADMIN — PANTOPRAZOLE SODIUM 40 MG: 40 INJECTION, POWDER, LYOPHILIZED, FOR SOLUTION INTRAVENOUS at 22:55

## 2022-03-17 RX ADMIN — PANTOPRAZOLE SODIUM 40 MG: 40 INJECTION, POWDER, LYOPHILIZED, FOR SOLUTION INTRAVENOUS at 09:58

## 2022-03-17 RX ADMIN — Medication 1000 UNITS: at 09:58

## 2022-03-17 RX ADMIN — MAGNESIUM SULFATE HEPTAHYDRATE 4 G: 40 INJECTION, SOLUTION INTRAVENOUS at 01:21

## 2022-03-17 RX ADMIN — HYDRALAZINE HYDROCHLORIDE 25 MG: 25 TABLET, FILM COATED ORAL at 06:04

## 2022-03-17 RX ADMIN — OXYCODONE HYDROCHLORIDE AND ACETAMINOPHEN 500 MG: 500 TABLET ORAL at 09:58

## 2022-03-17 RX ADMIN — POTASSIUM CHLORIDE, DEXTROSE MONOHYDRATE AND SODIUM CHLORIDE 150 ML/HR: 150; 5; 450 INJECTION, SOLUTION INTRAVENOUS at 01:04

## 2022-03-17 RX ADMIN — INSULIN DETEMIR 10 UNITS: 100 INJECTION, SOLUTION SUBCUTANEOUS at 09:59

## 2022-03-17 NOTE — PLAN OF CARE
Goal Outcome Evaluation:  Plan of Care Reviewed With: patient        Progress: improving  Outcome Evaluation: patient ambulated 30 feet with min assist x1 and rolling walker for support, cues for improved posture and to keep walker close. Patient reports being anxious and nervous today limiting tolerance to all activity. Distance limited by weakness and fatigue. Completed B UE/LE exercises.

## 2022-03-17 NOTE — PROGRESS NOTES
[facility]  PROGRESS NOTE    Patient Name: Thelma Michael  : 1960  MRN: [id number]    Date of Admission: 3/12/2022  Primary Care Physician: Monet Howe APRN    Subjective   Subjective     CC:  N/V, DKA     HPI:  Sitting up in bed, NAD. She is eager to try full liquids today. Has tolerated tube feeds overnight without complications. Mildly tender to lower quadrants.     ROS:  Gen- No fevers, chills  CV- No chest pain, palpitations  Resp- No cough, dyspnea  GI- No N/V/D, abd pain       Objective   Objective     Vital Signs:   Temp:  [97.9 °F (36.6 °C)-99.7 °F (37.6 °C)] 99.1 °F (37.3 °C)  Heart Rate:  [] 97  Resp:  [16-18] 16  BP: (120-206)/(50-93) 184/90     Physical Exam:  Constitutional: No acute distress, awake, alert  HENT: NCAT, mucous membranes moist, left nare keofeed   Respiratory: Clear to auscultation bilaterally, respiratory effort normal   Cardiovascular: RRR, no murmurs, rubs, or gallops  Gastrointestinal: Positive bowel sounds, soft, minimally tender to lower quadrants, nondistended  Musculoskeletal: No bilateral ankle edema  Psychiatric: Appropriate affect, cooperative  Neurologic: Oriented x 3, strength symmetric in all extremities, Cranial Nerves grossly intact to confrontation, speech clear  Skin: No rashes       Results Reviewed:  LAB RESULTS:      Lab 22  0357 03/15/22  1108 22  0343 22  0558 22  0101 22  0058   WBC 7.73 7.83 8.20 12.31*  --  8.65   HEMOGLOBIN 10.2* 10.6* 10.5* 11.7*  --  13.3   HEMATOCRIT 30.5* 31.3* 31.9* 34.2  --  38.4   PLATELETS 320 362 335 433  --  519*   NEUTROS ABS  --  5.58 5.75 11.64*  --  7.35*   IMMATURE GRANS (ABS)  --  0.04 0.03 0.04  --  0.04   LYMPHS ABS  --  1.60 1.80 0.49*  --  1.00   MONOS ABS  --  0.57 0.61 0.12  --  0.23   EOS ABS  --  0.02 0.00 0.00  --  0.01   MCV 88.4 87.4 91.9 88.4  --  86.7   PROCALCITONIN  --   --   --   --  0.07  --    LACTATE   --   --   --  3.3*  --  3.5*         Lab 03/17/22  1209 03/17/22  0342 03/17/22  0022 03/16/22 2022 03/16/22  1545 03/13/22  0101 03/13/22  0058   SODIUM 138 138 138 139 137   < >  --    POTASSIUM 3.9 3.7 3.9 3.5 4.0   < >  --    CHLORIDE 106 109* 108* 107 105   < >  --    CO2 21.0* 21.0* 20.0* 21.0* 19.0*   < >  --    ANION GAP 11.0 8.0 10.0 11.0 13.0   < >  --    BUN 5* 4* 4* 4* 4*   < >  --    CREATININE 0.85 0.89 0.81 0.77 0.77   < >  --    EGFR 77.1 73.0 81.7 86.8 86.8   < >  --    GLUCOSE 206* 202* 191* 183* 223*   < >  --    CALCIUM 8.8 8.3* 8.4* 8.6 8.9   < >  --    MAGNESIUM 2.7* 2.3 1.6 1.7 1.7   < >  --    PHOSPHORUS 3.1 2.9 3.5 3.3 3.4   < >  --    HEMOGLOBIN A1C  --   --   --   --   --   --  8.40*    < > = values in this interval not displayed.         Lab 03/13/22  0558 03/13/22  0101   TOTAL PROTEIN 7.1 7.8   ALBUMIN 4.20 4.50   GLOBULIN 2.9 3.3   ALT (SGPT) 16 17   AST (SGOT) 30 17   BILIRUBIN 0.6 0.7   ALK PHOS 84 91   LIPASE  --  25         Lab 03/13/22  0101   TROPONIN T 0.012                 Lab 03/13/22  0516   PH, ARTERIAL 7.489*   PCO2, ARTERIAL 26.6*   PO2 ART 99.9   FIO2 21   HCO3 ART 20.2   BASE EXCESS ART -1.9*   CARBOXYHEMOGLOBIN 0.7     Brief Urine Lab Results  (Last result in the past 365 days)      Color   Clarity   Blood   Leuk Est   Nitrite   Protein   CREAT   Urine HCG        03/13/22 0121 Yellow   Cloudy   Trace   Negative   Negative   >=300 mg/dL (3+)                 Microbiology Results Abnormal     Procedure Component Value - Date/Time    Blood Culture - Blood, Arm, Right [[id number]]  (Normal) Collected: 03/13/22 0600    Lab Status: Preliminary result Specimen: Blood from Arm, Right Updated: 03/17/22 0647     Blood Culture No growth at 4 days    Blood Culture - Blood, Arm, Left [[id number]]  (Normal) Collected: 03/13/22 0540    Lab Status: Preliminary result Specimen: Blood from Arm, Left Updated: 03/17/22 0647     Blood Culture No growth at 4 days    Urine Culture - Urine,  Urine, Clean Catch [[id number]] Collected: 03/13/22 0121    Lab Status: Final result Specimen: Urine, Clean Catch Updated: 03/14/22 1012     Urine Culture 50,000 CFU/mL Mixed Sameera Isolated    Narrative:      Specimen contains mixed organisms of questionable pathogenicity which indicates contamination with commensal sameera.  Further identification is unlikely to provide clinically useful information.  Suggest recollection.    Respiratory Panel PCR w/COVID-19(SARS-CoV-2) NORMA/JUAN ALBERTO/EDY/PAD/COR/MAD/ROSALINA In-House, NP Swab in UTM/VTM, 3-4 HR TAT - Swab, Nasopharynx [[id number]]  (Normal) Collected: 03/13/22 0639    Lab Status: Final result Specimen: Swab from Nasopharynx Updated: 03/13/22 0739     ADENOVIRUS, PCR Not Detected     Coronavirus 229E Not Detected     Coronavirus HKU1 Not Detected     Coronavirus NL63 Not Detected     Coronavirus OC43 Not Detected     COVID19 Not Detected     Human Metapneumovirus Not Detected     Human Rhinovirus/Enterovirus Not Detected     Influenza A PCR Not Detected     Influenza B PCR Not Detected     Parainfluenza Virus 1 Not Detected     Parainfluenza Virus 2 Not Detected     Parainfluenza Virus 3 Not Detected     Parainfluenza Virus 4 Not Detected     RSV, PCR Not Detected     Bordetella pertussis pcr Not Detected     Bordetella parapertussis PCR Not Detected     Chlamydophila pneumoniae PCR Not Detected     Mycoplasma pneumo by PCR Not Detected    Narrative:      In the setting of a positive respiratory panel with a viral infection PLUS a negative procalcitonin without other underlying concern for bacterial infection, consider observing off antibiotics or discontinuation of antibiotics and continue supportive care. If the respiratory panel is positive for atypical bacterial infection (Bordetella pertussis, Chlamydophila pneumoniae, or Mycoplasma pneumoniae), consider antibiotic de-escalation to target atypical bacterial infection.          XR Chest 1 View    Result Date: 3/15/2022  DATE  OF EXAM: 3/15/2022 10:19 PM  PROCEDURE: XR CHEST 1 VW-  INDICATIONS: cvc placement; E10.10-Type 1 diabetes mellitus with ketoacidosis without coma; R11.2-Nausea with vomiting, unspecified; K31.84-Gastroparesis  COMPARISON: 3/13/2022  TECHNIQUE: Single radiographic AP view of the chest was obtained.  FINDINGS: Right-sided IJ line terminates in the SVC. There is no pneumothorax. No new focal airspace opacity. No significant pleural effusion. Unchanged heart and mediastinal contours.      Impression: Right-sided IJ line terminates in the SVC. There is no pneumothorax.  This report was finalized on 3/15/2022 10:29 PM by Zana Muñoz.      XR Abdomen KUB    Result Date: 3/16/2022  DATE OF EXAM: 3/16/2022 8:41 AM  PROCEDURE: XR ABDOMEN KUB-  INDICATIONS: nj tube placement; E10.10-Type 1 diabetes mellitus with ketoacidosis without coma; R11.2-Nausea with vomiting, unspecified; K31.84-Gastroparesis  COMPARISON: No comparisons available.  TECHNIQUE: Single radiographic view of the abdomen was obtained.  FINDINGS: There is a postpyloric feeding tube with the tip in the region of the ligament of Treitz. The visualized bowel gas pattern is normal      Impression: Post pyloric feeding tube tip at the ligament of Treitz  This report was finalized on 3/16/2022 9:05 AM by Joey Thorpe.        Results for orders placed during the hospital encounter of 11/19/19    Adult Transesophageal Echo (LIZANDRO) W/ Cont if Necessary Per Protocol    Interpretation Summary  · Left ventricular systolic function is normal. Estimated EF = 65%.  · Left ventricular wall thickness is consistent with hypertrophy.  · Small patent foramen ovale present. Saline test results are positive with valsalva manuever.  · There is mild mitral valve prolapse of the anterior mitral leaflet.  · Mild tricuspid valve regurgitation is present.  · Estimated right ventricular systolic pressure from tricuspid regurgitation is mildly elevated (35-45 mmHg).  · There is mild  plaque in the descending aorta present.      I have reviewed the medications:  Scheduled Meds:Pharmacy Consult, , Does not apply, BID  amLODIPine, 10 mg, Nasogastric, Daily  ascorbic acid, 500 mg, Nasogastric, Daily  aspirin, 81 mg, Nasogastric, Daily  atorvastatin, 80 mg, Nasogastric, Nightly  cefTRIAXone, 1 g, Intravenous, Q24H  cholecalciferol, 1,000 Units, Oral, Daily  donepezil, 5 mg, Nasogastric, Nightly  FLUoxetine, 20 mg, Nasogastric, Daily  hydrALAZINE, 25 mg, Nasogastric, Q8H  insulin detemir, 10 Units, Subcutaneous, Q12H  insulin regular, 0-7 Units, Subcutaneous, Q6H  metoclopramide, 5 mg, Intravenous, Q6H  multivitamin with minerals, 1 tablet, Oral, Daily  pantoprazole, 40 mg, Intravenous, Q12H  sodium chloride, 10 mL, Intravenous, Q12H  sodium chloride, 10 mL, Intravenous, Q12H  sodium chloride, 10 mL, Intravenous, Q12H  terazosin, 1 mg, Oral, Nightly      Continuous Infusions:dextrose 5 % and sodium chloride 0.45 %, 150 mL/hr  dextrose 5 % and sodium chloride 0.45 % with KCl 20 mEq/L, 150 mL/hr, Last Rate: 150 mL/hr (03/17/22 0104)  dextrose 5 % and sodium chloride 0.45 % with KCl 40 mEq/L, 150 mL/hr  dextrose 5 % and sodium chloride 0.9 %, 150 mL/hr  dextrose 5 % and sodium chloride 0.9 % with KCl 20 mEq, 150 mL/hr  dextrose 5% and sodium chloride 0.9% with KCl 40 mEq/L, 150 mL/hr  niCARdipine, 5-15 mg/hr, Last Rate: Stopped (03/14/22 1638)  sodium chloride, 10 mL/hr      PRN Meds:.[DISCONTINUED] acetaminophen **OR** acetaminophen **OR** acetaminophen  •  albuterol  •  calcium carbonate  •  dextrose  •  dextrose  •  dextrose  •  dextrose 5 % and sodium chloride 0.45 %  •  dextrose 5 % and sodium chloride 0.45 % with KCl 20 mEq/L  •  dextrose 5 % and sodium chloride 0.45 % with KCl 40 mEq/L  •  dextrose 5 % and sodium chloride 0.9 %  •  dextrose 5 % and sodium chloride 0.9 % with KCl 20 mEq  •  dextrose 5% and sodium chloride 0.9% with KCl 40 mEq/L  •  gabapentin  •  glucagon (human recombinant)  •   hydrALAZINE  •  LORazepam  •  magnesium sulfate **OR** magnesium sulfate **OR** magnesium sulfate  •  melatonin  •  [DISCONTINUED] potassium chloride **OR** potassium chloride **OR** potassium chloride  •  potassium phosphate infusion greater than 15 mMoles **OR** potassium phosphate infusion greater than 15 mMoles **OR** potassium phosphate **OR** sodium phosphate IVPB **OR** sodium phosphate IVPB **OR** sodium phosphate IVPB  •  Sodium Chloride (PF)  •  sodium chloride  •  traMADol    Assessment/Plan   Assessment & Plan     Active Hospital Problems    Diagnosis  POA   • Type 1 diabetes mellitus with ketoacidosis without coma (HCC) [E10.10]  Yes   • Bacteriuria [R82.71]  Yes   • Gastroparesis [K31.84]  Yes   • Diabetic ketoacidosis without coma associated with type 1 diabetes mellitus (HCC) [E10.10]  Yes   • Hypertensive urgency [I16.0]  Yes   • Gastroesophageal reflux disease [K21.9]  Yes   • Hyperlipidemia [E78.5]  Yes      Resolved Hospital Problems   No resolved problems to display.        Brief Hospital Course to date:  Thelma Michael is a 61 y.o. female presents to the ED with with Nausea/vomiting/diarrhea and abdominal pain that began yesterday.     Plan was partially entered by my partner and I have reviewed and updated as appropriate on 3/16/22     DKA  Type 1 Diabetes   -anion gap: 23.0, glucose 316, ABG noted above, UA: glucose and ketones present  - gap closed but continue insulin gtt as unable to tolerate PO intake    -hgb A1c 8.40  -- tolerated tube feeds overnight and is without n/v. GI has recommended increasing to full liquid diet and continuing nocturnal feeds.      N/v  - Has been unable to tolerate PO intake. Per patient, feels similar to gastroparesis   - GI consulted -- EGD with endoscopic placement of nasojejunal tube 3/16 and started on tube feeds   - IV PPI; continue reglan   --improved, no more n/v overnight and will increase diet today      Hypokalemia   - continue to  replace      Hypertensive urgency   - /107  - Continue home meds - amlodipine and hytrin (on cardura at home)  - increase hydralazine 25 mg TID and now weaned off cardene gtt       Bacteriuria   - UA noted above   - UCx mixed sameera  - Continue rocephin x 5 days      Fever - improved   - UTI being treated; BCx NGTD  - CXR with no acute process   - Monitor for additional diarrhea     Diarrhea - improved   - No recent antibiotic use   -CT A/P no acute process   - Obtain GI PCR if has recurrence      Gastroparesis   - follows with GI  - getting referral to [facility] gastrointestinal motility clinic   - resume reglan   --GI [facility] lar[facility] root [facility]      GERD  -PPI BID      Prolonged QT   - improved; limit contributing medications        DVT prophylaxis:  Mechanical DVT prophylaxis orders are present.       AM-PAC 6 Clicks Score (PT): 19 (03/17/22 0919)    Disposition: I expect the patient to be discharged TBD.    CODE STATUS:   Code Status and Medical Interventions:   Ordered at: 03/13/22 0617     Level Of Support Discussed With:    Patient     Code Status (Patient has no pulse and is not breathing):    CPR (Attempt to Resuscitate)     Medical Interventions (Patient has pulse or is breathing):    Full Support       Susan Cohen, APRN  03/17/22

## 2022-03-17 NOTE — PROGRESS NOTES
Malnutrition Severity Assessment    Patient Name:  Thelma Michael  YOB: 1960  MRN: [id number]  Admit Date:  3/12/2022    Patient meets criteria for : Moderate (non-severe) Malnutrition (PO intakes <75% estimated needs >1 week, moderate muscle wasting, moderate subcutaneous fat loss.)    Malnutrition Severity Assessment  Malnutrition Type: Acute Disease or Injury - Related Malnutrition  Malnutrition Type (last 8 hours)     Malnutrition Severity Assessment     Row Name 03/17/22 [facility]       Malnutrition Severity Assessment    Malnutrition Type Acute Disease or Injury - Related Malnutrition    Row Name 03/17/22 1431       Insufficient Energy Intake     Insufficient Energy Intake Findings Moderate    Insufficient Energy Intake  <75% of est. energy requirement for >7d)  PO intakes <75% estimated needs for 8-10 days    Row Name 03/17/22 1431       Unintentional Weight Loss     Unintentional Weight Loss Findings Mild  loss 4 lb / 4% BW past 2 months    Row Name 03/17/22 1431       Muscle Loss    Loss of Muscle Mass Findings Moderate    [place] Region Moderate - slight depression    Clavicle Bone Region Moderate - some protrusion in females, visible in males    Acromion Bone Region Moderate - acromion may slightly protrude    Scapular Bone Region Moderate - mild depression, bones may show slightly    Dorsal Hand Region Moderate - slight depression    Row Name 03/17/22 1431       Fat Loss    Subcutaneous Fat Loss Findings Moderate    Orbital Region  Moderate -  somewhat hollowness, slightly dark circles    Upper Arm Region Moderate - some fat tissue, not ample    Row Name 03/17/22 1431       Criteria Met (Must meet criteria for severity in at least 2 of these categories: M Wasting, Fat Loss, Fluid, Secondary Signs, Wt. Status, Intake)    Patient meets criteria for  Moderate (non-severe) Malnutrition  PO intakes <75% estimated needs >1 week, moderate muscle wasting, moderate subcutaneous fat loss.                 Electronically signed by:  Kylah Gonzalez RD  03/17/22 14:34 EDT

## 2022-03-17 NOTE — THERAPY TREATMENT NOTE
Patient Name: Thelma Michael  : 1960    MRN: [id number]                              Today's Date: 3/17/2022       Admit Date: 3/12/2022    Visit Dx:     ICD-10-CM ICD-9-CM   1. Type 1 diabetes mellitus with ketoacidosis without coma (HCC)  E10.10 250.13   2. Non-intractable vomiting with nausea, unspecified vomiting type  R11.2 787.01   3. Gastroparesis  K31.84 536.3     Patient Active Problem List   Diagnosis   • Gastroesophageal reflux disease   • Back pain   • Diabetic peripheral neuropathy (HCC)   • Hyperlipidemia   • Hypertension   • Vitamin D deficiency   • Osteoporosis   • Tobacco use   • Personal history of cardiac murmur   • Pelvic pain   • History of sepsis   • Migraines   • Urinary retention   • Depression with anxiety   • Primary insomnia   • Iron deficiency anemia secondary to inadequate dietary iron intake   • Vertigo   • Acute UTI   • Severe malnutrition (HCC)   • Hypertensive urgency   • Uncontrolled type 1 diabetes mellitus with hyperglycemia (HCC)   • Diabetic ketoacidosis without coma associated with type 1 diabetes mellitus (HCC)   • Non-intractable vomiting   • History of TIAs   • Gastroparesis   • Hypokalemia   • Dehydration   • Nausea vomiting and diarrhea   • Hypokalemia   • Elevated serum creatinine   • Thrombocytosis   • Hypercalcemia   • Elevated serum protein level   • Leukocytosis   • Type 1 diabetes mellitus with ketoacidosis without coma (HCC)   • Bacteriuria     Past Medical History:   Diagnosis Date   • Acid reflux    • Acute bronchitis    • Cardiac murmur    • Diabetes mellitus (HCC)    • H/O echocardiogram 2012    i. LVEF 65%.ii. Mild LVH.iii. Borderline evidence of atrial septal aneurysm.  No PFO.    • History of nuclear stress test 2014    Negative for ischemia and scars; LVEF 77%.     • Hyperlipidemia    • Hypertension    • Impacted cerumen of both ears    • Migraine    • Self-catheterizes urinary bladder    • Sinusitis    • Stroke (HCC)    • Tobacco  abuse     quit 4 days ago.     • Urticaria      Past Surgical History:   Procedure Laterality Date   • CAPSULE ENDOSCOPY  7/27/2021    Procedure: PILLCAM DEPLOYMENT;  Surgeon: Mikael Worthy MD;  Location:  [facility];  Service: Gastroenterology;;   • COLONOSCOPY     • COLONOSCOPY N/A 7/27/2021    Procedure: COLONOSCOPY;  Surgeon: Mikael Worthy MD;  Location:  [facility];  Service: Gastroenterology;  Laterality: N/A;   • DENTAL PROCEDURE     • ENDOSCOPY N/A 6/30/2021    Procedure: ESOPHAGOGASTRODUODENOSCOPY;  Surgeon: Brunner, Mark I, MD;  Location:  [facility];  Service: Gastroenterology;  Laterality: N/A;   • ENDOSCOPY N/A 7/27/2021    Procedure: ESOPHAGOGASTRODUODENOSCOPY;  Surgeon: Mikael Worthy MD;  Location:  [facility];  Service: Gastroenterology;  Laterality: N/A;   • NO PAST SURGERIES        General Information     Row Name 03/17/22 0912          Physical Therapy Time and Intention    Document Type therapy note (daily note)  -AS     Mode of Treatment physical therapy  -AS     Row Name 03/17/22 0912          General Information    Patient Profile Reviewed yes  -AS     Existing Precautions/Restrictions fall  NG tube  -AS     Barriers to Rehab none identified  -AS     Row Name 03/17/22 0912          Cognition    Orientation Status (Cognition) oriented x 4  -AS     Row Name 03/17/22 0912          Safety Issues, Functional Mobility    Safety Issues Affecting Function (Mobility) safety precaution awareness;safety precautions follow-through/compliance  -AS     Impairments Affecting Function (Mobility) balance;endurance/activity tolerance;strength;postural/trunk control  -AS     Comment, Safety Issues/Impairments (Mobility) patient alert and following commands, patient reports being anxious and nervous today. Agreed to walk in room and to sit UIC  -AS           User Key  (r) = Recorded By, (t) = Taken By, (c) = Cosigned By    Initials Name Provider Type    AS Fanny Lucio  MARY Rosado Physical Therapy Assistant               Mobility     Row Name 03/17/22 0913          Bed Mobility    Supine-Sit Terrell (Bed Mobility) supervision  -AS     Sit-Supine Terrell (Bed Mobility) not tested  -AS     Comment, (Bed Mobility) increased time and effort to reach EOB, assist with multiple lines.  -AS     Row Name 03/17/22 0913          Transfers    Comment, (Transfers) sat EOB and donned pants prior to mobility  -AS     Row Name 03/17/22 0913          Bed-Chair Transfer    Bed-Chair Terrell (Transfers) verbal cues;1 person assist  -AS     Assistive Device (Bed-Chair Transfers) walker, front-wheeled  -AS     Comment, (Bed-Chair Transfer) cues to reach back when sitting  -AS     Row Name 03/17/22 0913          Sit-Stand Transfer    Sit-Stand Terrell (Transfers) verbal cues;contact guard;1 person assist  -AS     Assistive Device (Sit-Stand Transfers) walker, front-wheeled  -AS     Row Name 03/17/22 0913          Gait/Stairs (Locomotion)    Terrell Level (Gait) verbal cues;1 person assist;minimum assist (75% patient effort)  -AS     Assistive Device (Gait) walker, front-wheeled  -AS     Distance in Feet (Gait) 30  -AS     Deviations/Abnormal Patterns (Gait) bilateral deviations;base of support, narrow;miguel decreased;weight shifting decreased  -AS     Bilateral Gait Deviations forward flexed posture  -AS     Comment, (Gait/Stairs) patient ambulated 30 feet with min assist x1 and rolling walker for support, cues for improved posture and to keep waker close. Distance limited by weakness and fatigue. Completed B UE/LE exercises.  -AS           User Key  (r) = Recorded By, (t) = Taken By, (c) = Cosigned By    Initials Name Provider Type    AS Fanny Lucio PTA Physical Therapy Assistant               Obj/Interventions     Row Name 03/17/22 0916          Motor Skills    Therapeutic Exercise shoulder;ankle;knee  -AS     Row Name 03/17/22 0916          Shoulder (Therapeutic  Exercise)    Shoulder (Therapeutic Exercise) AROM (active range of motion)  -AS     Shoulder AROM (Therapeutic Exercise) bilateral;flexion;extension;aBduction;aDduction;sitting;10 repetitions  -AS     Row Name 03/17/22 0916          Knee (Therapeutic Exercise)    Knee (Therapeutic Exercise) strengthening exercise  -AS     Knee Strengthening (Therapeutic Exercise) bilateral;marching while seated;LAQ (long arc quad);sitting;10 repetitions  -AS     Row Name 03/17/22 0916          Ankle (Therapeutic Exercise)    Ankle (Therapeutic Exercise) AROM (active range of motion)  -AS     Ankle AROM (Therapeutic Exercise) bilateral;dorsiflexion;plantarflexion;sitting;10 repetitions  -AS     Row Name 03/17/22 0916          Balance    Static Standing Balance contact guard;1-person assist  -AS     Dynamic Standing Balance verbal cues;minimal assist;1-person assist  -AS     Position/Device Used, Standing Balance walker, front-wheeled  -AS           User Key  (r) = Recorded By, (t) = Taken By, (c) = Cosigned By    Initials Name Provider Type    AS Fanny Lucio PTA Physical Therapy Assistant               Goals/Plan    No documentation.                Clinical Impression     Row Name 03/17/22 0917          Pain    Pretreatment Pain Rating 3/10  -AS     Posttreatment Pain Rating 3/10  -AS     Pain Location - abdomen  -AS     Pain Intervention(s) Repositioned;Ambulation/increased activity  -AS     Row Name 03/17/22 0917          Plan of Care Review    Plan of Care Reviewed With patient  -AS     Progress improving  -AS     Outcome Evaluation patient ambulated 30 feet with min assist x1 and rolling walker for support, cues for improved posture and to keep walker close. Patient reports being anxious and nervous today limiting tolerance to all activity. Distance limited by weakness and fatigue. Completed B UE/LE exercises.  -AS     Row Name 03/17/22 0917          Positioning and Restraints    Pre-Treatment Position in bed  -AS      Post Treatment Position chair  -AS     In Chair reclined;call light within reach;encouraged to call for assist;exit alarm on;legs elevated;notified nsg  -AS           User Key  (r) = Recorded By, (t) = Taken By, (c) = Cosigned By    Initials Name Provider Type    AS Fanny Lucio PTA Physical Therapy Assistant               Outcome Measures     Row Name 03/17/22 0919          How much help from another person do you currently need...    Turning from your back to your side while in flat bed without using bedrails? 4  -AS     Moving from lying on back to sitting on the side of a flat bed without bedrails? 4  -AS     Moving to and from a bed to a chair (including a wheelchair)? 3  -AS     Standing up from a chair using your arms (e.g., wheelchair, bedside chair)? 3  -AS     Climbing 3-5 steps with a railing? 2  -AS     To walk in hospital room? 3  -AS     AM-PAC 6 Clicks Score (PT) 19  -AS     Row Name 03/17/22 0919          Functional Assessment    Outcome Measure Options AM-PAC 6 Clicks Basic Mobility (PT)  -AS           User Key  (r) = Recorded By, (t) = Taken By, (c) = Cosigned By    Initials Name Provider Type    AS Fanny Lucio PTA Physical Therapy Assistant                             Physical Therapy Education                 Title: PT OT SLP Therapies (In Progress)     Topic: Physical Therapy (In Progress)     Point: Mobility training (In Progress)     Learning Progress Summary           Patient Acceptance, E, NR by AS at 3/17/2022 0919    Acceptance, E, VU,NR by CD at 3/15/2022 1333    Comment: BENEFITS OF OOB ACTIVITY, SAFETY WITH MOBILITY, PROGRESSION OF POC, D/C PLANNING,                   Point: Home exercise program (In Progress)     Learning Progress Summary           Patient Acceptance, E, NR by AS at 3/17/2022 0919    Acceptance, E, VU,NR by CD at 3/15/2022 1333    Comment: BENEFITS OF OOB ACTIVITY, SAFETY WITH MOBILITY, PROGRESSION OF POC, D/C PLANNING,                   Point: Body  mechanics (In Progress)     Learning Progress Summary           Patient Acceptance, E, NR by AS at 3/17/2022 0919    Acceptance, E, VU,NR by CD at 3/15/2022 1333    Comment: BENEFITS OF OOB ACTIVITY, SAFETY WITH MOBILITY, PROGRESSION OF POC, D/C PLANNING,                   Point: Precautions (In Progress)     Learning Progress Summary           Patient Acceptance, E, NR by AS at 3/17/2022 0919    Acceptance, E, VU,NR by CD at 3/15/2022 1333    Comment: BENEFITS OF OOB ACTIVITY, SAFETY WITH MOBILITY, PROGRESSION OF POC, D/C PLANNING,                               User Key     Initials Effective Dates Name Provider Type Discipline    CD 06/16/21 -  Carina Gallardo PT Physical Therapist PT    AS 06/16/21 -  Fanny Lucio PTA Physical Therapy Assistant PT              PT Recommendation and Plan     Plan of Care Reviewed With: patient  Progress: improving  Outcome Evaluation: patient ambulated 30 feet with min assist x1 and rolling walker for support, cues for improved posture and to keep walker close. Patient reports being anxious and nervous today limiting tolerance to all activity. Distance limited by weakness and fatigue. Completed B UE/LE exercises.     Time Calculation:    PT Charges     Row Name 03/17/22 0921             Time Calculation    Start Time 0832  -AS      PT Received On 03/17/22  -AS      PT Goal Re-Cert Due Date 03/25/22  -AS              Timed Charges    87569 - PT Therapeutic Exercise Minutes 10  -AS      84193 - Gait Training Minutes  13  -AS              Total Minutes    Timed Charges Total Minutes 23  -AS       Total Minutes 23  -AS            User Key  (r) = Recorded By, (t) = Taken By, (c) = Cosigned By    Initials Name Provider Type    AS Fanny Lucio PTA Physical Therapy Assistant              Therapy Charges for Today     Code Description Service Date Service Provider Modifiers Qty    95871571262 HC PT THER PROC EA 15 MIN 3/17/2022 Fanny Lucio PTA GP 1    [id number]  HC GAIT TRAINING EA 15 MIN 3/17/2022 Fanny Lucio, PTA GP 1          PT G-Codes  Outcome Measure Options: AM-PAC 6 Clicks Basic Mobility (PT)  AM-PAC 6 Clicks Score (PT): 19  AM-PAC 6 Clicks Score (OT): 16    Fanny Lucio PTA  3/17/2022

## 2022-03-17 NOTE — PROGRESS NOTES
"GI Daily Progress Note  Subjective:    Chief Complaint: Follow-up gastroparesis    Thelma is resting in bed in no acute distress.  Notes some mild nasal discomfort secondary to NJ tube was placed endoscopically yesterday.  Tolerating tube feeds at goal.  No new or worsening GI symptoms.  Denies pain.    Objective:    BP (!) 184/90 (BP Location: Right arm, Patient Position: Lying)   Pulse 97   Temp 98.8 °F (37.1 °C)   Resp 16   Ht 172.7 cm (68\")   Wt 57.7 kg (127 lb 3.2 oz)   SpO2 96%   BMI 19.34 kg/m²     Physical Exam  Vitals and nursing note reviewed.   Constitutional:       General: She is not in acute distress.     Appearance: Normal appearance. She is normal weight. She is not ill-appearing or toxic-appearing.   HENT:      Head: Normocephalic and atraumatic.      Comments: Keofeed secured to nares; tube feeds at goal.  Eyes:      Conjunctiva/sclera: Conjunctivae normal.      Pupils: Pupils are equal, round, and reactive to light.   Cardiovascular:      Rate and Rhythm: Normal rate and regular rhythm.      Pulses: Normal pulses.      Heart sounds: Normal heart sounds.   Pulmonary:      Effort: Pulmonary effort is normal. No respiratory distress.      Breath sounds: Normal breath sounds.   Abdominal:      General: Abdomen is flat. Bowel sounds are normal. There is no distension.      Palpations: Abdomen is soft. There is no mass.      Tenderness: There is no abdominal tenderness. There is no guarding or rebound.      Hernia: No hernia is present.   Skin:     General: Skin is warm and dry.      Capillary Refill: Capillary refill takes less than 2 seconds.      Coloration: Skin is not jaundiced or pale.   Neurological:      General: No focal deficit present.      Mental Status: She is alert and oriented to person, place, and time.   Psychiatric:         Mood and Affect: Mood normal.         Behavior: Behavior normal.         Thought Content: Thought content normal.         Judgment: Judgment normal. "         Lab  I have personally reviewed most recent cardiac tracings, lab results and radiology images and interpretations and agree with findings.    Lab Results   Component Value Date    WBC 7.73 03/16/2022    HGB 10.2 (L) 03/16/2022    HGB 10.6 (L) 03/15/2022    HGB 10.5 (L) 03/14/2022    MCV 88.4 03/16/2022     03/16/2022    INR 0.95 05/20/2021    INR 1.10 10/31/2019    INR 0.9 10/31/2019    INR 1.01 08/05/2015    INR 1.04 08/17/2014       Lab Results   Component Value Date    GLUCOSE 202 (H) 03/17/2022    BUN 4 (L) 03/17/2022    CREATININE 0.89 03/17/2022    EGFRIFNONA 61 01/06/2015    EGFRIFAFRI 62 02/13/2022    BCR 4.5 (L) 03/17/2022     03/17/2022    K 3.7 03/17/2022    CO2 21.0 (L) 03/17/2022    CALCIUM 8.3 (L) 03/17/2022    PROTENTOTREF 7.6 01/06/2015    ALBUMIN 4.20 03/13/2022    ALKPHOS 84 03/13/2022    BILITOT 0.6 03/13/2022    ALT 16 03/13/2022    AST 30 03/13/2022       Assessment:    1.  Gastroparesis diabeticorum  2.  Intractable nausea and vomiting, related to above, improved  3.  Diarrhea, improved  4.  Diabetic ketoacidosis, improved  5.  Type 1 diabetes mellitus    Plan:  Patient doing well today.  Tolerating tube feeds.  Would like to try liquids if possible.  >>> Begin full liquid diet today  >>> Stop continuous tube feeds; plan for nocturnal feeds.  >>> Continue twice daily PPI; reiterated that this will need to be continued at discharge.  >>> Continue Reglan  >>> Discussed management gastroparesis at home with patient.  We will plan to continue gingerroot 3 times daily with Reglan for breakthrough occurrences.  Patient will have prescriptions for both oral and intranasal Reglan to use in breakthrough.  We will also prescribe Zofran ODT at discharge for episodes when patient's nausea vomiting inhibit her ability of oral intake.    My office is arranging follow-up with [facility] gastrointestinal motility clinic.  Will notify patient when this appointment is  made.    Terence White, APRN  03/17/22  11:58 EDT

## 2022-03-17 NOTE — PROGRESS NOTES
Clinical Nutrition   Reason For Visit: Physician consult, EN    Patient Name: Thelma Michael  YOB: 1960  MRN: [id number]  Date of Encounter: 03/17/22 14:05 EDT  Admission date: 3/12/2022    Pt meets criteria severe malnutrition in context of acute illness, see [facility] for full assessment.    EN initiated yesterday at 2200, pt tolerated well but only received 560 ml / 672 kcal before continuous EN d/c per GI, plan was to start nocturnal EN tonight to supplement full liquid diet. Unfortunately shortly after this pt started to c/o pain and requested NJ to be removed.     Pt remains at risk refeeding syndrome as she still has had minimal nutrition for now >1 week. Recommend continuing closely monitoring electrolytes, replace as needed.    Pt did tolerate about 50% of her lunch and willing to consume supplements as tolerated, on order 5x/day. If she remains unable to tolerate PO, would recommend trying NJ again vs consider PEJ. Pt voices understanding importance nutrition but currently states she does not want tube replaced.    Nutrition Assessment     Admission Problem List:    Gastroesophageal reflux disease    Hyperlipidemia    Hypertensive urgency    Diabetic ketoacidosis without coma associated with type 1 diabetes mellitus (HCC)    Gastroparesis    Type 1 diabetes mellitus with ketoacidosis without coma (HCC)    Bacteriuria       Applicable PMH:      Applicable Nutrition-Related Information:      Applicable medical tests/procedures since admission:  3/16) EGD- normal  3/16) abd. KUB   IMPRESSION:  Post pyloric feeding tube tip at the ligament of Treitz      PMH: She  has a past medical history of Acid reflux, Acute bronchitis, Cardiac murmur, Diabetes mellitus ([facility]), H/O echocardiogram (08/07/2012), History of nuclear stress test (08/22/2014), Hyperlipidemia, Hypertension, Impacted cerumen of both ears, Migraine, Self-catheterizes urinary bladder, Sinusitis, Stroke ([facility]), Tobacco abuse, and  Urticaria.   PSxH: She  has a past surgical history that includes No past surgeries; Dental surgery; Colonoscopy; Esophagogastroduodenoscopy (N/A, 6/30/2021); Colonoscopy (N/A, 7/27/2021); Esophagogastroduodenoscopy (N/A, 7/27/2021); Capsule Endoscopy (7/27/2021); and endoscopy with jtube (N/A, 3/16/2022).        Reported/Observed/Food/Nutrition Related History     3/17) EN initiated yesterday at 2200, pt tolerated well but only received 560 ml / 672 kcal before continuous EN d/c per GI, plan was to start nocturnal EN tonight to supplement full liquid diet. Unfortunately shortly after this pt started to c/o pain and requested NJ to be removed. Pt lying in bed in good spirits with daughter at beside. She states she tolerated lunch okay, ate about 50%. She does not want tube replaced, states it was very painful. She and dtr have several questions about diet appropriate for gastroparesis. Insulin drip was d/c, BG moderate-high. Mag slightly high this am, other lytes wnl.     3/16) Pt admit with DKA 3/12, has been unable to tolerate PO intake since, several days minimal PO intake prior to admission as well. Pt also with gastroparesis and states her symptoms are similar to that of flare ups in the past. EGD this morning was normal, NJ placed.    Anthropometrics   Height: 68 in  Weight: 121 lb per bed scale 3/13  BMI: 19.4  BMI classification: Normal: 18.5-24.9kg/m2   IBW: 140 lb    UBW: ~125 lb per pt, Per EMR:  (5/21) 126 lb   (7/21) 123 lb  (1/22) 125 lb  (2/22) 121 lb     Weight change: loss 4 lb / 4% body weight past 2 months    *RD notes wide variation in bed weights since admission of 119-133 lb. Suspect admission bed weight most accurate, will continue to monitor.     Nutrition Focused Physical Exam     Pt meets criteria severe malnutrition in context of acute illness, see [facility] for full assessment.    Labs reviewed   Labs reviewed: Yes    Results from last 7 days   Lab Units 03/17/22  1209 03/17/22  8927  03/17/22  0022   SODIUM mmol/L 138 138 138   POTASSIUM mmol/L 3.9 3.7 3.9   CHLORIDE mmol/L 106 109* 108*   CO2 mmol/L 21.0* 21.0* 20.0*   BUN mg/dL 5* 4* 4*   CREATININE mg/dL 0.85 0.89 0.81   GLUCOSE mg/dL 206* 202* 191*   CALCIUM mg/dL 8.8 8.3* 8.4*   PHOSPHORUS mg/dL 3.1 2.9 3.5   MAGNESIUM mg/dL 2.7* 2.3 1.6     Results from last 7 days   Lab Units 03/16/22  0357 03/15/22  1108 03/14/22  0343 03/13/22  0558 03/13/22  0101   WBC 10*3/mm3 7.73 7.83 8.20 12.31*  --    ALBUMIN g/dL  --   --   --  4.20 4.50     Results from last 7 days   Lab Units 03/17/22  1113 03/17/22  0917 03/17/22  0821 03/17/22  0702 03/17/22  0607 03/17/22  0522   GLUCOSE mg/dL 222* 184* 125 120 113 123     Lab Results   Lab Value Date/Time    HGBA1C 8.40 (H) 03/13/2022 0058    HGBA1C 10.30 (H) 02/11/2022 0814     Medications reviewed   Medications reviewed: Yes  Scheduled: abx, vit c, d3, mvi, reglan  GTT: cardene drip  PRN: D5W, electrolyte replacements    Needs Assessment   Height used: 68 in  Weight used: 121 lb / 55 kg  IBW: 140 lb    Estimated Calories needs: ~1450 kcal /day  1153 x 1.2 = 1384 kcal  25-27 kcal/kg = 6146-2188 kcal    Estimated Protein needs: ~83 g protein / day  1.2-1.5 g/kg = 66-83    Estimated Fluid needs: ~1375 ml / day  1 ml/kcal    Current Nutrition Prescription   PO: Diet Full Liquid  Oral Nutrition Supplement: Orders Placed This Encounter      Dietary Nutrition Supplements Boost Glucose Control      Dietary Nutrition Supplements Magic Cup      DIET MESSAGE Hot tea with breakfast meals please, no coffee       EN: d/c 3/17    Average PO intake: RD observed ~50% first meal    EN delivery:   1 Day: 560 ml / 672 kcal- (55% goal)    Nutrition Diagnosis       Problem Malnutrition    Etiology Gastroparesis   Signs/Symptoms PO intakes <75% estimated needs >1 week, moderate muscle wasting, moderate subcutaneous fat loss.     3/16, 3/17  Problem Altered GI function   Etiology DKA / Gastroparesis    Signs/Symptoms  N/V/inability to tolerate PO intake 4+ days   Status: ongoing/improved- tolerated full liquid diet 3/17    Goal:   General: Nutrition to support treatment  PO: Tolerate PO, Increase intake, Advace diet as medically appropriate     Intervention   Intervention: Follow treatment progress, Care plan reviewed, Advise alternate selection, Advised available snacks, Interview for preferences, Menu provided, Encourage intake, Supplement provided, Nutrition support order placed, Education provided    Recommend diet advanced as soon as medically appropriate to low fiber/low residue  Boost GC 3x/day  Magic cup 2x/day  Pt encouraged to request alternatives as needed  Continue to monitor BG and electrolytes closely / treat as indicated  Diet education/counseling provided for malnutrition, gastroparesis, and DM  If PO intakes remain <50% for >48 hr, recommend replacing NJ if in POC/GOC    Monitoring/Evaluation:   Monitoring/Evaluation: Per protocol, I&O, PO intake, Supplement intake, Pertinent labs, EN delivery/tolerance, Weight, Skin status, GI status, Symptoms, POC/GOC, Hemodynamic stability    Kylah Gonzalez RD  Time Spent: 50

## 2022-03-18 PROBLEM — E44.0 MODERATE MALNUTRITION (HCC): Status: ACTIVE | Noted: 2022-03-18

## 2022-03-18 LAB
ANION GAP SERPL CALCULATED.3IONS-SCNC: 10 MMOL/L (ref 5–15)
ANION GAP SERPL CALCULATED.3IONS-SCNC: 9 MMOL/L (ref 5–15)
BACTERIA SPEC AEROBE CULT: NORMAL
BACTERIA SPEC AEROBE CULT: NORMAL
BUN SERPL-MCNC: 6 MG/DL (ref 8–23)
BUN SERPL-MCNC: 6 MG/DL (ref 8–23)
BUN/CREAT SERPL: 7.8 (ref 7–25)
BUN/CREAT SERPL: 8 (ref 7–25)
CALCIUM SPEC-SCNC: 8.4 MG/DL (ref 8.6–10.5)
CALCIUM SPEC-SCNC: 8.4 MG/DL (ref 8.6–10.5)
CHLORIDE SERPL-SCNC: 106 MMOL/L (ref 98–107)
CHLORIDE SERPL-SCNC: 106 MMOL/L (ref 98–107)
CO2 SERPL-SCNC: 22 MMOL/L (ref 22–29)
CO2 SERPL-SCNC: 22 MMOL/L (ref 22–29)
CREAT SERPL-MCNC: 0.75 MG/DL (ref 0.57–1)
CREAT SERPL-MCNC: 0.77 MG/DL (ref 0.57–1)
EGFRCR SERPLBLD CKD-EPI 2021: 86.8 ML/MIN/1.73
EGFRCR SERPLBLD CKD-EPI 2021: 89.6 ML/MIN/1.73
GLUCOSE BLDC GLUCOMTR-MCNC: 123 MG/DL (ref 70–130)
GLUCOSE BLDC GLUCOMTR-MCNC: 125 MG/DL (ref 70–130)
GLUCOSE BLDC GLUCOMTR-MCNC: 131 MG/DL (ref 70–130)
GLUCOSE BLDC GLUCOMTR-MCNC: 140 MG/DL (ref 70–130)
GLUCOSE BLDC GLUCOMTR-MCNC: 141 MG/DL (ref 70–130)
GLUCOSE SERPL-MCNC: 114 MG/DL (ref 65–99)
GLUCOSE SERPL-MCNC: 123 MG/DL (ref 65–99)
MAGNESIUM SERPL-MCNC: 2 MG/DL (ref 1.6–2.4)
MAGNESIUM SERPL-MCNC: 2 MG/DL (ref 1.6–2.4)
PHOSPHATE SERPL-MCNC: 3.6 MG/DL (ref 2.5–4.5)
PHOSPHATE SERPL-MCNC: 3.6 MG/DL (ref 2.5–4.5)
POTASSIUM SERPL-SCNC: 3.3 MMOL/L (ref 3.5–5.2)
POTASSIUM SERPL-SCNC: 3.6 MMOL/L (ref 3.5–5.2)
POTASSIUM SERPL-SCNC: 4 MMOL/L (ref 3.5–5.2)
SODIUM SERPL-SCNC: 137 MMOL/L (ref 136–145)
SODIUM SERPL-SCNC: 138 MMOL/L (ref 136–145)

## 2022-03-18 PROCEDURE — 80048 BASIC METABOLIC PNL TOTAL CA: CPT | Performed by: INTERNAL MEDICINE

## 2022-03-18 PROCEDURE — 83735 ASSAY OF MAGNESIUM: CPT | Performed by: INTERNAL MEDICINE

## 2022-03-18 PROCEDURE — 25010000002 CEFTRIAXONE PER 250 MG: Performed by: INTERNAL MEDICINE

## 2022-03-18 PROCEDURE — 84132 ASSAY OF SERUM POTASSIUM: CPT | Performed by: INTERNAL MEDICINE

## 2022-03-18 PROCEDURE — 84100 ASSAY OF PHOSPHORUS: CPT | Performed by: INTERNAL MEDICINE

## 2022-03-18 PROCEDURE — 25010000002 METOCLOPRAMIDE PER 10 MG: Performed by: INTERNAL MEDICINE

## 2022-03-18 PROCEDURE — 99231 SBSQ HOSP IP/OBS SF/LOW 25: CPT | Performed by: INTERNAL MEDICINE

## 2022-03-18 PROCEDURE — 99232 SBSQ HOSP IP/OBS MODERATE 35: CPT | Performed by: NURSE PRACTITIONER

## 2022-03-18 PROCEDURE — 82962 GLUCOSE BLOOD TEST: CPT

## 2022-03-18 PROCEDURE — 63710000001 INSULIN DETEMIR PER 5 UNITS: Performed by: NURSE PRACTITIONER

## 2022-03-18 RX ORDER — POTASSIUM CHLORIDE 1.5 G/1.77G
40 POWDER, FOR SOLUTION ORAL AS NEEDED
Status: DISCONTINUED | OUTPATIENT
Start: 2022-03-18 | End: 2022-03-19 | Stop reason: HOSPADM

## 2022-03-18 RX ORDER — POTASSIUM CHLORIDE 750 MG/1
40 CAPSULE, EXTENDED RELEASE ORAL AS NEEDED
Status: DISCONTINUED | OUTPATIENT
Start: 2022-03-18 | End: 2022-03-19 | Stop reason: HOSPADM

## 2022-03-18 RX ORDER — PANTOPRAZOLE SODIUM 40 MG/1
40 TABLET, DELAYED RELEASE ORAL
Status: DISCONTINUED | OUTPATIENT
Start: 2022-03-18 | End: 2022-03-19 | Stop reason: HOSPADM

## 2022-03-18 RX ORDER — METOCLOPRAMIDE 5 MG/1
5 TABLET ORAL
Status: DISCONTINUED | OUTPATIENT
Start: 2022-03-18 | End: 2022-03-18

## 2022-03-18 RX ADMIN — OXYCODONE HYDROCHLORIDE AND ACETAMINOPHEN 500 MG: 500 TABLET ORAL at 08:42

## 2022-03-18 RX ADMIN — HYDRALAZINE HYDROCHLORIDE 25 MG: 25 TABLET, FILM COATED ORAL at 12:42

## 2022-03-18 RX ADMIN — PANTOPRAZOLE SODIUM 40 MG: 40 TABLET, DELAYED RELEASE ORAL at 17:07

## 2022-03-18 RX ADMIN — Medication 5 MG: at 22:41

## 2022-03-18 RX ADMIN — Medication 1000 UNITS: at 08:42

## 2022-03-18 RX ADMIN — Medication 10 ML: at 22:26

## 2022-03-18 RX ADMIN — METOCLOPRAMIDE 5 MG: 5 TABLET ORAL at 12:42

## 2022-03-18 RX ADMIN — HYDRALAZINE HYDROCHLORIDE 25 MG: 25 TABLET, FILM COATED ORAL at 06:03

## 2022-03-18 RX ADMIN — TRAMADOL HYDROCHLORIDE 50 MG: 50 TABLET, COATED ORAL at 17:16

## 2022-03-18 RX ADMIN — ATORVASTATIN CALCIUM 80 MG: 40 TABLET, FILM COATED ORAL at 22:19

## 2022-03-18 RX ADMIN — Medication 10 ML: at 08:42

## 2022-03-18 RX ADMIN — ASPIRIN 81 MG 81 MG: 81 TABLET ORAL at 08:41

## 2022-03-18 RX ADMIN — FLUOXETINE HYDROCHLORIDE 20 MG: 20 CAPSULE ORAL at 08:41

## 2022-03-18 RX ADMIN — INSULIN DETEMIR 10 UNITS: 100 INJECTION, SOLUTION SUBCUTANEOUS at 08:48

## 2022-03-18 RX ADMIN — Medication 1 TABLET: at 12:42

## 2022-03-18 RX ADMIN — Medication 10 ML: at 22:27

## 2022-03-18 RX ADMIN — PANTOPRAZOLE SODIUM 40 MG: 40 INJECTION, POWDER, LYOPHILIZED, FOR SOLUTION INTRAVENOUS at 08:41

## 2022-03-18 RX ADMIN — INSULIN DETEMIR 10 UNITS: 100 INJECTION, SOLUTION SUBCUTANEOUS at 22:22

## 2022-03-18 RX ADMIN — SODIUM CHLORIDE 1 G: 900 INJECTION INTRAVENOUS at 00:19

## 2022-03-18 RX ADMIN — POTASSIUM CHLORIDE 40 MEQ: 750 CAPSULE, EXTENDED RELEASE ORAL at 17:07

## 2022-03-18 RX ADMIN — HYDRALAZINE HYDROCHLORIDE 25 MG: 25 TABLET, FILM COATED ORAL at 22:20

## 2022-03-18 RX ADMIN — DONEPEZIL HYDROCHLORIDE 5 MG: 5 TABLET, FILM COATED ORAL at 22:20

## 2022-03-18 RX ADMIN — METOCLOPRAMIDE 5 MG: 5 INJECTION, SOLUTION INTRAMUSCULAR; INTRAVENOUS at 00:19

## 2022-03-18 RX ADMIN — POTASSIUM CHLORIDE 40 MEQ: 750 CAPSULE, EXTENDED RELEASE ORAL at 13:33

## 2022-03-18 RX ADMIN — TERAZOSIN HYDROCHLORIDE 1 MG: 1 CAPSULE ORAL at 22:19

## 2022-03-18 RX ADMIN — METOCLOPRAMIDE 5 MG: 5 INJECTION, SOLUTION INTRAMUSCULAR; INTRAVENOUS at 06:03

## 2022-03-18 RX ADMIN — AMLODIPINE BESYLATE 10 MG: 10 TABLET ORAL at 08:42

## 2022-03-18 NOTE — PROGRESS NOTES
"GI Daily Progress Note  Subjective     Thelma Michael is a 61 y.o. female who was admitted with DKA.  Had bad dreams last night.  Tolerating diet.  No further N/V.         Chief Complaint:  N/V    Objective     /62 (BP Location: Right arm, Patient Position: Lying)   Pulse 95   Temp 98.7 °F (37.1 °C) (Oral)   Resp 16   Ht 172.7 cm (68\")   Wt 58.3 kg (128 lb 8 oz)   SpO2 94%   BMI 19.54 kg/m²     Intake/Output last 3 shifts:  I/O last 3 completed shifts:  In: 390 [P.O.:360; Other:30]  Out: 1000 [Urine:1000]  Intake/Output this shift:  No intake/output data recorded.      Physical Exam  Wt Readings from Last 3 Encounters:   03/18/22 58.3 kg (128 lb 8 oz)   02/25/22 64 kg (141 lb)   02/23/22 63.5 kg (140 lb)   ,body mass index is 19.54 kg/m².,@FLOWAMB(6)@,@FLOWAMB(5)@,@FLOWAMB(8)@   CONSTITUTIONAL:lyingin bed in no distress  Resp CTA; no rhonchi, rales, or wheezes.  Respiration effort normal  CV RRR; no M/R/G. No lower extremity edema  GI Abd soft, NT, ND, normal active bowel sounds.    Psych: Awake alert and oriented    DATA:    Assessment/Plan     1.  Gastroparesis diabeticorum  2.  Intractable nausea and vomiting, related to above, improved  3.  Diarrhea, improved  4.  Diabetic ketoacidosis, improved  5.  Type 1 diabetes mellitus    Tolerating GI soft diet  Office to arrange FU with [facility] motility clinic  Needs Zofran ODT at discharge    I will sign off          Gastroesophageal reflux disease    Hyperlipidemia    Hypertensive urgency    Diabetic ketoacidosis without coma associated with type 1 diabetes mellitus (HCC)    Gastroparesis    Type 1 diabetes mellitus with ketoacidosis without coma (HCC)    Bacteriuria       LOS: 5 days     Thom Glover MD  03/18/22  09:55 EDT  "

## 2022-03-18 NOTE — CASE MANAGEMENT/SOCIAL WORK
Continued Stay Note  [facility]     Patient Name: Thelma Michael  MRN: [id number]  Today's Date: 3/18/2022    Admit Date: 3/12/2022     Discharge Plan     Row Name 03/18/22 1351       Plan    Plan home    Plan Comments I met with Mrs. Michael at the bedside to discuss her discharge plans. She is hoping to be discharged tomorrow. She denies having any needs for additional DME or therapy after this admission. She is ambulating in her room independently with her straight cane. She reports that she has had a death in the family and will be traveling to [place], [place] with her children. Family will assist her as needed. She denies having any discharge needs.    Final Discharge Disposition Code 01 - home or self-care               Discharge Codes    No documentation.                     Christian Manzo RN

## 2022-03-18 NOTE — PROGRESS NOTES
[facility] Medicine Services  PROGRESS NOTE    Patient Name: Thelma Michael  : 1960  MRN: [id number]    Date of Admission: 3/12/2022  Primary Care Physician: Monet Howe APRN    Subjective   Subjective     CC:  N/V, DKA     HPI:  Tolerating diet without any difficulty. Keofeed has been removed. No pain. Having loose stools.     ROS:  Gen- No fevers, chills  CV- No chest pain, palpitations  Resp- No cough, dyspnea  GI- No N/V/D, abd pain       Objective   Objective     Vital Signs:   Temp:  [98.4 °F (36.9 °C)-99.1 °F (37.3 °C)] 98.5 °F (36.9 °C)  Heart Rate:  [] 90  Resp:  [16] 16  BP: (153-186)/() 159/84     Physical Exam:  Constitutional: No acute distress, awake, alert  HENT: NCAT, mucous membranes moist   Respiratory: Clear to auscultation bilaterally, respiratory effort normal   Cardiovascular: RRR, no murmurs, rubs, or gallops  Gastrointestinal: Positive bowel sounds, soft, nontender, nondistended  Musculoskeletal: No bilateral ankle edema  Psychiatric: Appropriate affect, cooperative  Neurologic: Oriented x 3, strength symmetric in all extremities, Cranial Nerves grossly intact to confrontation, speech clear  Skin: No rashes       Results Reviewed:  LAB RESULTS:      Lab 22  0357 03/15/22  1108 22  0343 22  0558 22  0101 22  0058   WBC 7.73 7.83 8.20 12.31*  --  8.65   HEMOGLOBIN 10.2* 10.6* 10.5* 11.7*  --  13.3   HEMATOCRIT 30.5* 31.3* 31.9* 34.2  --  38.4   PLATELETS 320 362 335 433  --  519*   NEUTROS ABS  --  5.58 5.75 11.64*  --  7.35*   IMMATURE GRANS (ABS)  --  0.04 0.03 0.04  --  0.04   LYMPHS ABS  --  1.60 1.80 0.49*  --  1.00   MONOS ABS  --  0.57 0.61 0.12  --  0.23   EOS ABS  --  0.02 0.00 0.00  --  0.01   MCV 88.4 87.4 91.9 88.4  --  86.7   PROCALCITONIN  --   --   --   --  0.07  --    LACTATE  --   --   --  3.3*  --  3.5*         Lab 22  1149 22  0720 22  2036 22  1205  03/17/22  0342 03/13/22  0101 03/13/22  0058   SODIUM 138 137 140 138 138   < >  --    POTASSIUM 3.6 3.3* 3.8 3.9 3.7   < >  --    CHLORIDE 106 106 107 106 109*   < >  --    CO2 22.0 22.0 23.0 21.0* 21.0*   < >  --    ANION GAP 10.0 9.0 10.0 11.0 8.0   < >  --    BUN 6* 6* 7* 5* 4*   < >  --    CREATININE 0.77 0.75 0.95 0.85 0.89   < >  --    EGFR 86.8 89.6 67.5 77.1 73.0   < >  --    GLUCOSE 123* 114* 147* 206* 202*   < >  --    CALCIUM 8.4* 8.4* 8.9 8.8 8.3*   < >  --    MAGNESIUM 2.0 2.0 2.4 2.7* 2.3   < >  --    PHOSPHORUS 3.6 3.6 3.2 3.1 2.9   < >  --    HEMOGLOBIN A1C  --   --   --   --   --   --  8.40*    < > = values in this interval not displayed.         Lab 03/13/22  0558 03/13/22  0101   TOTAL PROTEIN 7.1 7.8   ALBUMIN 4.20 4.50   GLOBULIN 2.9 3.3   ALT (SGPT) 16 17   AST (SGOT) 30 17   BILIRUBIN 0.6 0.7   ALK PHOS 84 91   LIPASE  --  25         Lab 03/13/22  0101   TROPONIN T 0.012                 Lab 03/13/22  0516   PH, ARTERIAL 7.489*   PCO2, ARTERIAL 26.6*   PO2 ART 99.9   FIO2 21   HCO3 ART 20.2   BASE EXCESS ART -1.9*   CARBOXYHEMOGLOBIN 0.7     Brief Urine Lab Results  (Last result in the past 365 days)      Color   Clarity   Blood   Leuk Est   Nitrite   Protein   CREAT   Urine HCG        03/13/22 0121 Yellow   Cloudy   Trace   Negative   Negative   >=300 mg/dL (3+)                 Microbiology Results Abnormal     Procedure Component Value - Date/Time    Blood Culture - Blood, Arm, Right [[id number]]  (Normal) Collected: 03/13/22 0600    Lab Status: Final result Specimen: Blood from Arm, Right Updated: 03/18/22 0647     Blood Culture No growth at 5 days    Blood Culture - Blood, Arm, Left [[id number]]  (Normal) Collected: 03/13/22 0540    Lab Status: Final result Specimen: Blood from Arm, Left Updated: 03/18/22 0647     Blood Culture No growth at 5 days    Urine Culture - Urine, Urine, Clean Catch [[id number]] Collected: 03/13/22 0121    Lab Status: Final result Specimen: Urine, Clean Catch  Updated: 03/14/22 1012     Urine Culture 50,000 CFU/mL Mixed Sameera Isolated    Narrative:      Specimen contains mixed organisms of questionable pathogenicity which indicates contamination with commensal sameera.  Further identification is unlikely to provide clinically useful information.  Suggest recollection.    Respiratory Panel PCR w/COVID-19(SARS-CoV-2) NORMA/JUAN ALBERTO/EDY/PAD/COR/MAD/ROSALINA In-House, NP Swab in UTM/VTM, 3-4 HR TAT - Swab, Nasopharynx [[id number]]  (Normal) Collected: 03/13/22 0639    Lab Status: Final result Specimen: Swab from Nasopharynx Updated: 03/13/22 0739     ADENOVIRUS, PCR Not Detected     Coronavirus 229E Not Detected     Coronavirus HKU1 Not Detected     Coronavirus NL63 Not Detected     Coronavirus OC43 Not Detected     COVID19 Not Detected     Human Metapneumovirus Not Detected     Human Rhinovirus/Enterovirus Not Detected     Influenza A PCR Not Detected     Influenza B PCR Not Detected     Parainfluenza Virus 1 Not Detected     Parainfluenza Virus 2 Not Detected     Parainfluenza Virus 3 Not Detected     Parainfluenza Virus 4 Not Detected     RSV, PCR Not Detected     Bordetella pertussis pcr Not Detected     Bordetella parapertussis PCR Not Detected     Chlamydophila pneumoniae PCR Not Detected     Mycoplasma pneumo by PCR Not Detected    Narrative:      In the setting of a positive respiratory panel with a viral infection PLUS a negative procalcitonin without other underlying concern for bacterial infection, consider observing off antibiotics or discontinuation of antibiotics and continue supportive care. If the respiratory panel is positive for atypical bacterial infection (Bordetella pertussis, Chlamydophila pneumoniae, or Mycoplasma pneumoniae), consider antibiotic de-escalation to target atypical bacterial infection.          No radiology results from the last 24 hrs    Results for orders placed during the hospital encounter of 11/19/19    Adult Transesophageal Echo (LIZANDRO) W/ Cont if  Necessary Per Protocol    Interpretation Summary  · Left ventricular systolic function is normal. Estimated EF = 65%.  · Left ventricular wall thickness is consistent with hypertrophy.  · Small patent foramen ovale present. Saline test results are positive with valsalva manuever.  · There is mild mitral valve prolapse of the anterior mitral leaflet.  · Mild tricuspid valve regurgitation is present.  · Estimated right ventricular systolic pressure from tricuspid regurgitation is mildly elevated (35-45 mmHg).  · There is mild plaque in the descending aorta present.      I have reviewed the medications:  Scheduled Meds:Pharmacy Consult, , Does not apply, BID  amLODIPine, 10 mg, Nasogastric, Daily  ascorbic acid, 500 mg, Nasogastric, Daily  aspirin, 81 mg, Nasogastric, Daily  atorvastatin, 80 mg, Nasogastric, Nightly  cefTRIAXone, 1 g, Intravenous, Q24H  cholecalciferol, 1,000 Units, Oral, Daily  donepezil, 5 mg, Nasogastric, Nightly  FLUoxetine, 20 mg, Nasogastric, Daily  hydrALAZINE, 25 mg, Nasogastric, Q8H  insulin detemir, 10 Units, Subcutaneous, Q12H  insulin regular, 0-7 Units, Subcutaneous, 4x Daily With Meals & Nightly  metoclopramide, 5 mg, Oral, 4x Daily AC & at Bedtime  multivitamin with minerals, 1 tablet, Oral, Daily  pantoprazole, 40 mg, Oral, BID AC  sodium chloride, 10 mL, Intravenous, Q12H  sodium chloride, 10 mL, Intravenous, Q12H  sodium chloride, 10 mL, Intravenous, Q12H  terazosin, 1 mg, Oral, Nightly      Continuous Infusions:dextrose 5 % and sodium chloride 0.45 %, 150 mL/hr  dextrose 5 % and sodium chloride 0.45 % with KCl 20 mEq/L, 150 mL/hr, Last Rate: 150 mL/hr (03/17/22 0104)  dextrose 5 % and sodium chloride 0.45 % with KCl 40 mEq/L, 150 mL/hr  dextrose 5 % and sodium chloride 0.9 %, 150 mL/hr  dextrose 5 % and sodium chloride 0.9 % with KCl 20 mEq, 150 mL/hr  dextrose 5% and sodium chloride 0.9% with KCl 40 mEq/L, 150 mL/hr  niCARdipine, 5-15 mg/hr, Last Rate: Stopped (03/14/22  1638)  sodium chloride, 10 mL/hr      PRN Meds:.[DISCONTINUED] acetaminophen **OR** acetaminophen **OR** acetaminophen  •  albuterol  •  calcium carbonate  •  dextrose  •  dextrose  •  dextrose  •  dextrose 5 % and sodium chloride 0.45 %  •  dextrose 5 % and sodium chloride 0.45 % with KCl 20 mEq/L  •  dextrose 5 % and sodium chloride 0.45 % with KCl 40 mEq/L  •  dextrose 5 % and sodium chloride 0.9 %  •  dextrose 5 % and sodium chloride 0.9 % with KCl 20 mEq  •  dextrose 5% and sodium chloride 0.9% with KCl 40 mEq/L  •  gabapentin  •  glucagon (human recombinant)  •  hydrALAZINE  •  LORazepam  •  magnesium sulfate **OR** magnesium sulfate **OR** magnesium sulfate  •  melatonin  •  potassium chloride  •  potassium chloride  •  potassium phosphate infusion greater than 15 mMoles **OR** potassium phosphate infusion greater than 15 mMoles **OR** potassium phosphate **OR** sodium phosphate IVPB **OR** sodium phosphate IVPB **OR** sodium phosphate IVPB  •  Sodium Chloride (PF)  •  sodium chloride  •  traMADol    Assessment/Plan   Assessment & Plan     Active Hospital Problems    Diagnosis  POA   • Moderate malnutrition (CMS/HCC) [E44.0]  Yes   • Type 1 diabetes mellitus with ketoacidosis without coma (HCC) [E10.10]  Yes   • Bacteriuria [R82.71]  Yes   • Gastroparesis [K31.84]  Yes   • Diabetic ketoacidosis without coma associated with type 1 diabetes mellitus (HCC) [E10.10]  Yes   • Hypertensive urgency [I16.0]  Yes   • Gastroesophageal reflux disease [K21.9]  Yes   • Hyperlipidemia [E78.5]  Yes      Resolved Hospital Problems   No resolved problems to display.        Brief Hospital Course to date:  Thelma Michael is a 61 y.o. female presents to the ED with with Nausea/vomiting/diarrhea and abdominal pain that began yesterday.     Plan was partially entered by my partner and I have reviewed and updated as appropriate on 3/16/22     DKA  Type 1 Diabetes   -anion gap: 23.0, glucose 316, ABG noted above, UA: glucose  and ketones present  - gap closed while in insulin drip    -hgb A1c 8.40  -- now tolerating diet   --blood sugars have remained stable on basal and SSI      N/v  - Has been unable to tolerate PO intake. Per patient, feels similar to gastroparesis   - GI consulted -- EGD with endoscopic placement of nasojejunal tube 3/16 and started on tube feeds   - IV PPI; continue reglan at DC   --resolved, tolerating diet well      Hypokalemia   - continue to replace      Hypertensive urgency   - /107  - Continue home meds - amlodipine and hytrin (on cardura at home)  - increase hydralazine 25 mg TID and now weaned off cardene gtt       Bacteriuria   - UA noted above   - UCx mixed sameera  - Continue rocephin x 5 days      Fever - improved   - UTI being treated; BCx NGTD  - CXR with no acute process   - Monitor for additional diarrhea     Diarrhea - improved   - No recent antibiotic use   -CT A/P no acute process   - Obtain GI PCR if has recurrence      Gastroparesis   - follows with GI  - getting referral to [facility] gastrointestinal motility clinic   - resume reglan   --GI discussed lara root TID with reglan for breakthrough n/v at discahrge.      GERD  -PPI BID      Prolonged QT   - improved; limit contributing medications        DVT prophylaxis:  Mechanical DVT prophylaxis orders are present.       AM-PAC 6 Clicks Score (PT): 19 (03/17/22 0919)    Disposition: I expect the patient to be discharged home in AM     CODE STATUS:   Code Status and Medical Interventions:   Ordered at: 03/13/22 0617     Level Of Support Discussed With:    Patient     Code Status (Patient has no pulse and is not breathing):    CPR (Attempt to Resuscitate)     Medical Interventions (Patient has pulse or is breathing):    Full Support       Susan Cohen, APRN  03/18/22

## 2022-03-19 ENCOUNTER — READMISSION MANAGEMENT (OUTPATIENT)
Dept: CALL CENTER | Facility: HOSPITAL | Age: 62
End: 2022-03-19

## 2022-03-19 VITALS
BODY MASS INDEX: 19.48 KG/M2 | TEMPERATURE: 98.5 F | SYSTOLIC BLOOD PRESSURE: 175 MMHG | HEIGHT: 68 IN | DIASTOLIC BLOOD PRESSURE: 80 MMHG | OXYGEN SATURATION: 96 % | HEART RATE: 86 BPM | RESPIRATION RATE: 16 BRPM | WEIGHT: 128.5 LBS

## 2022-03-19 PROBLEM — E10.10 TYPE 1 DIABETES MELLITUS WITH KETOACIDOSIS WITHOUT COMA: Status: RESOLVED | Noted: 2022-03-13 | Resolved: 2022-03-19

## 2022-03-19 LAB
GLUCOSE BLDC GLUCOMTR-MCNC: 183 MG/DL (ref 70–130)
GLUCOSE BLDC GLUCOMTR-MCNC: 79 MG/DL (ref 70–130)

## 2022-03-19 PROCEDURE — 63710000001 INSULIN REGULAR HUMAN PER 5 UNITS: Performed by: INTERNAL MEDICINE

## 2022-03-19 PROCEDURE — 63710000001 INSULIN DETEMIR PER 5 UNITS: Performed by: NURSE PRACTITIONER

## 2022-03-19 PROCEDURE — 82962 GLUCOSE BLOOD TEST: CPT

## 2022-03-19 PROCEDURE — 99239 HOSP IP/OBS DSCHRG MGMT >30: CPT | Performed by: INTERNAL MEDICINE

## 2022-03-19 RX ORDER — INSULIN GLARGINE 100 [IU]/ML
10 INJECTION, SOLUTION SUBCUTANEOUS NIGHTLY
Qty: 15 ML | Refills: 3 | Status: SHIPPED | OUTPATIENT
Start: 2022-03-19 | End: 2022-03-19 | Stop reason: HOSPADM

## 2022-03-19 RX ADMIN — Medication 1 TABLET: at 08:24

## 2022-03-19 RX ADMIN — AMLODIPINE BESYLATE 10 MG: 10 TABLET ORAL at 08:23

## 2022-03-19 RX ADMIN — FLUOXETINE HYDROCHLORIDE 20 MG: 20 CAPSULE ORAL at 08:23

## 2022-03-19 RX ADMIN — PANTOPRAZOLE SODIUM 40 MG: 40 TABLET, DELAYED RELEASE ORAL at 08:24

## 2022-03-19 RX ADMIN — INSULIN HUMAN 2 UNITS: 100 INJECTION, SOLUTION PARENTERAL at 12:15

## 2022-03-19 RX ADMIN — HYDRALAZINE HYDROCHLORIDE 25 MG: 25 TABLET, FILM COATED ORAL at 05:52

## 2022-03-19 RX ADMIN — OXYCODONE HYDROCHLORIDE AND ACETAMINOPHEN 500 MG: 500 TABLET ORAL at 08:24

## 2022-03-19 RX ADMIN — ASPIRIN 81 MG 81 MG: 81 TABLET ORAL at 08:24

## 2022-03-19 RX ADMIN — Medication 1000 UNITS: at 08:24

## 2022-03-19 RX ADMIN — INSULIN DETEMIR 10 UNITS: 100 INJECTION, SOLUTION SUBCUTANEOUS at 08:24

## 2022-03-19 NOTE — OUTREACH NOTE
Prep Survey    Flowsheet Row Responses   [facility] patient discharged from? [facility]   Is LACE score < 7 ? No   Emergency Room discharge w/ pulse ox? No   Eligibility [facility]   Date of Admission 03/12/22   Date of Discharge 03/19/22   Discharge Disposition Home or Self Care   Discharge diagnosis Type 1 diabetes mellitus with ketoacidosis    Does the patient have one of the following disease processes/diagnoses(primary or secondary)? Other   Does the patient have Home health ordered? No   Is there a DME ordered? No   Prep survey completed? Yes          VIRGIE MANLEY - Registered Nurse

## 2022-03-19 NOTE — DISCHARGE SUMMARY
[facility]  DISCHARGE SUMMARY    Patient Name: Thelma Michael  : 1960  MRN: [id number]    Date of Admission: 3/12/2022 10:36 PM  Date of Discharge:  3/19/2022  Primary Care Physician: Monet Howe APRN    Consults     Date and Time Order Name Status Description    3/15/2022 10:16 AM Inpatient Gastroenterology Consult Completed     2022  7:09 AM Inpatient Gastroenterology Consult Completed           Hospital Course     Presenting Problem:   Type 1 diabetes mellitus with ketoacidosis without coma (HCC) [E10.10]    Active Hospital Problems    Diagnosis  POA   • Moderate malnutrition (CMS/HCC) [E44.0]  Yes   • Bacteriuria [R82.71]  Yes   • Gastroparesis [K31.84]  Yes   • Hypertensive urgency [I16.0]  Yes   • Gastroesophageal reflux disease [K21.9]  Yes   • Hyperlipidemia [E78.5]  Yes      Resolved Hospital Problems    Diagnosis Date Resolved POA   • Type 1 diabetes mellitus with ketoacidosis without coma (HCC) [E10.10] 2022 Yes          Hospital Course:  Thelma Michael is a 61 y.o. female presents to the ED with with nausea, vomiting and diarrhea as well as abdominal pain. Patient was noted to have an anion gap of 23 on presentation.  Glucose was elevated.  ABG did not show an acidosis.  CO2 was mildly decreased at 18.  She had a mild hypokalemia.  Beta hydroxybutyrate was elevated 2.1.  Magnesium was 1.4.  Lactate was 3.3.  She had a mild leukocytosis of 12.  CT abdomen pelvis showed no acute process.  She was started on an insulin drip with DKA protocol with improvement in her sugars.  She was transitioned to subcutaneous insulin with prandial insulin.  Patient had not been taking her insulin because she had been feeling bad.  Patient reports that she has an insulin pump and gets approximately 10 units a day which is similar to what she is getting here. Patient can follow-up with her PCP in 1 to 2 weeks for close  monitoring.    GI was consulted given the patient's nausea and vomiting.  She has gastroparesis.  GI performed an EGD with endoscopic placement of a nasojejunal tube on 316 in which she was started on tube feeds.  GI will place referral for GI motility clinic in [place].    Patient was also noted to have a UTI.  Urine culture with mixed sameera.  She completed 5 days of Rocephin inpatient.  Does not need antibiotics on discharge.    Blood pressures were elevated on presentation.  She was continued on her home meds amlodipine and Hytrin.  We will continue her hydrochlorothiazide at increased dose of 25 mg daily.  She needs to follow-up with her PCP in 1 to 2 weeks for blood pressure management.      Discharge Follow Up Recommendations for outpatient labs/diagnostics:  Follow-up with PCP in 1 to 2 weeks regarding blood pressure and diabetes  Referral to GI motility clinic in [place], to be arranged by Dr. Glover's office    Day of Discharge     HPI:   Patient is doing well this morning.  Tolerating p.o.  Has no complaints.  Feels ready to go home.    Review of Systems  General: denies fevers or chills  CV: denies chest pain  Resp: denies shortness of breath  Abd: denies abd pain, nausea      Vital Signs:   Temp:  [97.3 °F (36.3 °C)-98.5 °F (36.9 °C)] 98.5 °F (36.9 °C)  Heart Rate:  [] 86  Resp:  [16] 16  BP: (150-178)/(64-91) 175/80      Physical Exam:  Constitutional: No acute distress, awake, alert, thin and malnourished  Respiratory: Clear to auscultation bilaterally, respiratory effort normal   Cardiovascular: RRR, no murmurs, rubs, or gallops  Gastrointestinal: Positive bowel sounds, soft, nontender, nondistended  Musculoskeletal: No bilateral ankle edema  Psychiatric: Appropriate affect, cooperative  Neurologic: Oriented x 3, no focal neurological deficits  Skin: No rashes      Pertinent  and/or Most Recent Results     LAB RESULTS:      Lab 03/16/22  0357 03/15/22  1108 03/14/22  0343 03/13/22  0558  03/13/22  0101 03/13/22 0058   WBC 7.73 7.83 8.20 12.31*  --  8.65   HEMOGLOBIN 10.2* 10.6* 10.5* 11.7*  --  13.3   HEMATOCRIT 30.5* 31.3* 31.9* 34.2  --  38.4   PLATELETS 320 362 335 433  --  519*   NEUTROS ABS  --  5.58 5.75 11.64*  --  7.35*   IMMATURE GRANS (ABS)  --  0.04 0.03 0.04  --  0.04   LYMPHS ABS  --  1.60 1.80 0.49*  --  1.00   MONOS ABS  --  0.57 0.61 0.12  --  0.23   EOS ABS  --  0.02 0.00 0.00  --  0.01   MCV 88.4 87.4 91.9 88.4  --  86.7   PROCALCITONIN  --   --   --   --  0.07  --    LACTATE  --   --   --  3.3*  --  3.5*         Lab 03/18/22  2212 03/18/22  1149 03/18/22  0720 03/17/22  2036 03/17/22  1209 03/17/22  0342 03/13/22  0101 03/13/22 0058   SODIUM  --  138 137 140 138 138   < >  --    POTASSIUM 4.0 3.6 3.3* 3.8 3.9 3.7   < >  --    CHLORIDE  --  106 106 107 106 109*   < >  --    CO2  --  22.0 22.0 23.0 21.0* 21.0*   < >  --    ANION GAP  --  10.0 9.0 10.0 11.0 8.0   < >  --    BUN  --  6* 6* 7* 5* 4*   < >  --    CREATININE  --  0.77 0.75 0.95 0.85 0.89   < >  --    EGFR  --  86.8 89.6 67.5 77.1 73.0   < >  --    GLUCOSE  --  123* 114* 147* 206* 202*   < >  --    CALCIUM  --  8.4* 8.4* 8.9 8.8 8.3*   < >  --    MAGNESIUM  --  2.0 2.0 2.4 2.7* 2.3   < >  --    PHOSPHORUS  --  3.6 3.6 3.2 3.1 2.9   < >  --    HEMOGLOBIN A1C  --   --   --   --   --   --   --  8.40*    < > = values in this interval not displayed.         Lab 03/13/22  0558 03/13/22  0101   TOTAL PROTEIN 7.1 7.8   ALBUMIN 4.20 4.50   GLOBULIN 2.9 3.3   ALT (SGPT) 16 17   AST (SGOT) 30 17   BILIRUBIN 0.6 0.7   ALK PHOS 84 91   LIPASE  --  25         Lab 03/13/22  0101   TROPONIN T 0.012                 Lab 03/13/22  0516   PH, ARTERIAL 7.489*   PCO2, ARTERIAL 26.6*   PO2 ART 99.9   FIO2 21   HCO3 ART 20.2   BASE EXCESS ART -1.9*   CARBOXYHEMOGLOBIN 0.7     Brief Urine Lab Results  (Last result in the past 365 days)      Color   Clarity   Blood   Leuk Est   Nitrite   Protein   CREAT   Urine HCG        03/13/22 0121 Yellow    Cloudy   Trace   Negative   Negative   >=300 mg/dL (3+)               Microbiology Results (last 10 days)     Procedure Component Value - Date/Time    Respiratory Panel PCR w/COVID-19(SARS-CoV-2) [facility]Y/PAD/[facility]ALINA In-House, NP Swab in UTM/VTM, 3-4 HR TAT - Swab, Nasopharynx [[id number]]  (Normal) Collected: 03/13/22 0639    Lab Status: Final result Specimen: Swab from Nasopharynx Updated: 03/13/22 0739     ADENOVIRUS, PCR Not Detected     Coronavirus 229E Not Detected     Coronavirus HKU1 Not Detected     Coronavirus NL63 Not Detected     Coronavirus OC43 Not Detected     COVID19 Not Detected     Human Metapneumovirus Not Detected     Human Rhinovirus/Enterovirus Not Detected     Influenza A PCR Not Detected     Influenza B PCR Not Detected     Parainfluenza Virus 1 Not Detected     Parainfluenza Virus 2 Not Detected     Parainfluenza Virus 3 Not Detected     Parainfluenza Virus 4 Not Detected     RSV, PCR Not Detected     Bordetella pertussis pcr Not Detected     Bordetella parapertussis PCR Not Detected     Chlamydophila pneumoniae PCR Not Detected     Mycoplasma pneumo by PCR Not Detected    Narrative:      In the setting of a positive respiratory panel with a viral infection PLUS a negative procalcitonin without other underlying concern for bacterial infection, consider observing off antibiotics or discontinuation of antibiotics and continue supportive care. If the respiratory panel is positive for atypical bacterial infection (Bordetella pertussis, Chlamydophila pneumoniae, or Mycoplasma pneumoniae), consider antibiotic de-escalation to target atypical bacterial infection.    Blood Culture - Blood, Arm, Right [[id number]]  (Normal) Collected: 03/13/22 0600    Lab Status: Final result Specimen: Blood from Arm, Right Updated: 03/18/22 0647     Blood Culture No growth at 5 days    Blood Culture - Blood, Arm, Left [[id number]]  (Normal) Collected: 03/13/22 0540    Lab Status: Final result Specimen: Blood  from Arm, Left Updated: 03/18/22 0647     Blood Culture No growth at 5 days    Urine Culture - Urine, Urine, Clean Catch [[id number]] Collected: 03/13/22 0121    Lab Status: Final result Specimen: Urine, Clean Catch Updated: 03/14/22 1012     Urine Culture 50,000 CFU/mL Mixed Sameera Isolated    Narrative:      Specimen contains mixed organisms of questionable pathogenicity which indicates contamination with commensal sameera.  Further identification is unlikely to provide clinically useful information.  Suggest recollection.          CT Angiogram Abdomen Pelvis    Result Date: 3/13/2022  EXAMINATION: CTA ABDOMEN AND PELVIS WITH IV CONTRAST   DATE OF EXAMINATION: 3/13/2022. COMPARISON: 2/11/2022. INDICATION: Severe abdominal pain with nausea, vomiting and dark emesis. Jaundice. PROCEDURE:  Axial CT of the abdomen and pelvis was performed without and with contrast and sagittal and coronal reformatted images were performed.  75 mL of Isovue-370 was given intravenously. CT dose lowering techniques were used, to include: automated exposure control, adjustment for patient size, and/or use of iterative reconstruction. Maximum intensity projection 3-D reformats were performed. FINDINGS: LOWER CHEST :  The visualized lung bases are clear.  There are no pleural or pericardial effusions. ABDOMEN: Liver and Biliary system:  Normal. Adrenal glands:  Bilateral adrenal nodules appear unchanged most likely adenomas.. Kidneys and ureters: Subcentimeter hypodensities are seen within the left kidney that are too small fully characterize. The kidneys otherwise appear unremarkable. Spleen:  Normal. Pancreas:  Normal. Gallbladder:  Normal. Lymph nodes, Peritoneum and mesentery:  There is no mesenteric or retroperitoneal lymphadenopathy. Gastrointestinal tract:  There are no dilated loops of bowel or free intraperitoneal air.    The appendix is normal. There is mild sigmoid diverticulosis without evidence of diverticulitis. Aorta/IVC:   There  is moderate vascular calcification throughout the abdominal aorta without evidence of aneurysmal dilation or dissection.  IVC normal. Abdominal wall:  Normal. PELVIS: Fluid: There is no free fluid in the pelvis. Lymph Nodes:  There is no pelvic or inguinal lymphadenopathy.. Urinary bladder:  Lobular contour to the liver is unchanged.. BONES:  There are no osseous destructive lesions.. ADDITIONAL  SIGNIFICANT FINDINGS:  None.     1. No acute process seen within the abdomen or pelvis. 2. Diverticulosis without evidence of diverticulitis. Electronically signed by:  Jimmie Luna D.O.  3/13/2022 12:15 AM Mountain Time    XR Chest 1 View    Result Date: 3/15/2022  DATE OF EXAM: 3/15/2022 10:19 PM  PROCEDURE: XR CHEST 1 VW-  INDICATIONS: cvc placement; E10.10-Type 1 diabetes mellitus with ketoacidosis without coma; R11.2-Nausea with vomiting, unspecified; K31.84-Gastroparesis  COMPARISON: 3/13/2022  TECHNIQUE: Single radiographic AP view of the chest was obtained.  FINDINGS: Right-sided IJ line terminates in the SVC. There is no pneumothorax. No new focal airspace opacity. No significant pleural effusion. Unchanged heart and mediastinal contours.      Right-sided IJ line terminates in the SVC. There is no pneumothorax.  This report was finalized on 3/15/2022 10:29 PM by Zana Muñoz.      XR Chest 1 View    Result Date: 3/13/2022  XR CHEST 1 VW-  Date of Exam: 3/13/2022 2:54 PM  Indication: DKA; E10.10-Type 1 diabetes mellitus with ketoacidosis without coma; R11.2-Nausea with vomiting, unspecified.  Comparison:?02/10/2022  Technique:?A single view of the chest was obtained.  FINDINGS:  ?Heart size and pulmonary vessels are within normal limits.  Lungs are clear bilaterally.  No pleural effusion or pneumothorax.  Bony structures are unremarkable.         1. No acute cardiopulmonary disease.   This report was finalized on 3/13/2022 3:17 PM by Jimmie Alfaro MD.      XR Abdomen KUB    Result Date: 3/16/2022  DATE OF  EXAM: 3/16/2022 8:41 AM  PROCEDURE: XR ABDOMEN KUB-  INDICATIONS: nj tube placement; E10.10-Type 1 diabetes mellitus with ketoacidosis without coma; R11.2-Nausea with vomiting, unspecified; K31.84-Gastroparesis  COMPARISON: No comparisons available.  TECHNIQUE: Single radiographic view of the abdomen was obtained.  FINDINGS: There is a postpyloric feeding tube with the tip in the region of the ligament of Treitz. The visualized bowel gas pattern is normal      Post pyloric feeding tube tip at the ligament of Treitz  This report was finalized on 3/16/2022 9:05 AM by Joey Thorpe.                Results for orders placed during the hospital encounter of 11/19/19    Adult Transesophageal Echo (LIZANDRO) W/ Cont if Necessary Per Protocol    Interpretation Summary  · Left ventricular systolic function is normal. Estimated EF = 65%.  · Left ventricular wall thickness is consistent with hypertrophy.  · Small patent foramen ovale present. Saline test results are positive with valsalva manuever.  · There is mild mitral valve prolapse of the anterior mitral leaflet.  · Mild tricuspid valve regurgitation is present.  · Estimated right ventricular systolic pressure from tricuspid regurgitation is mildly elevated (35-45 mmHg).  · There is mild plaque in the descending aorta present.      Plan for Follow-up of Pending Labs/Results:     Discharge Details        Discharge Medications      Continue These Medications      Instructions Start Date   acetaminophen 325 MG tablet  Commonly known as: TYLENOL   650 mg, Oral, Every 4 Hours PRN      albuterol sulfate  (90 Base) MCG/ACT inhaler  Commonly known as: PROVENTIL HFA;VENTOLIN HFA;PROAIR HFA   2 puffs, Inhalation, Every 4 Hours PRN      amLODIPine 10 MG tablet  Commonly known as: NORVASC   10 mg, Oral, Daily      aspirin 81 MG EC tablet   81 mg, Oral, Daily      atorvastatin 80 MG tablet  Commonly known as: LIPITOR   80 mg, Oral, Nightly      BD Pen Needle Cydney U/F 32G X 4 MM  misc  Generic drug: Insulin Pen Needle   1 each, Oral, 4 Times Daily      Benefiber Drink Mix pack   1 Scoop, Oral, Daily      calcium carbonate 500 MG chewable tablet  Commonly known as: TUMS   2 tablets, Oral, 3 Times Daily PRN      Dexcom G6  device   1 kit, Does not apply, Every 3 Months      Dexcom G6 Sensor   Does not apply, Every 10 Days      Dexcom G6 Transmitter misc   1 kit, Does not apply, Every 3 Months      donepezil 5 MG tablet  Commonly known as: Aricept   5 mg, Oral, Nightly      doxazosin 1 MG tablet  Commonly known as: CARDURA   1 mg, Oral, Nightly      ferrous sulfate 325 (65 FE) MG tablet   325 mg, Oral, Daily With Breakfast, Take with orange juice or vitamin C      FLUoxetine 20 MG capsule  Commonly known as: PROzac   20 mg, Oral, Daily      FreeStyle Lite w/Device kit   USE UNIT TO CHECK GLUCOSE 4 TIMES DAILY      gabapentin 400 MG capsule  Commonly known as: NEURONTIN   400 mg, Oral, 2 Times Daily PRN      glucose blood test strip  Commonly known as: Glucose Meter Test   Use as instructed to check blood glucose levels 3 times daily      glucose blood test strip   Use as instructed      glucose monitor monitoring kit   1 each, Does not apply, 4 Times Daily      hydroCHLOROthiazide 12.5 MG tablet  Commonly known as: HYDRODIURIL   12.5 mg, Oral, Daily      melatonin 5 MG tablet tablet   5 mg, Oral, Nightly PRN      multivitamin tablet tablet   1 tablet, Oral, Daily      Multivitamin-Minerals tablet   1 tablet, Oral, Daily      ondansetron 4 MG tablet  Commonly known as: ZOFRAN   4 mg, Oral, Every 6 Hours PRN      pantoprazole 40 MG EC tablet  Commonly known as: PROTONIX   40 mg, Oral, Daily      sennosides-docusate 8.6-50 MG per tablet  Commonly known as: PERICOLACE   2 tablets, Oral, 2 Times Daily PRN      traMADol 50 MG tablet  Commonly known as: ULTRAM   50 mg, Oral, Every 6 Hours PRN      traZODone 50 MG tablet  Commonly known as: DESYREL   TAKE 1-2 TABLETS ONE HOUR BEFORE BEDTIME AS  NEEDED FOR SLEEP.      VITAMIN C PO   Vitamin C TABS      vitamin D 1.25 MG (05798 UT) capsule capsule  Commonly known as: ERGOCALCIFEROL   Take 1 capsule by mouth once a week         Stop These Medications    BASAGLAR KWIKPEN 100 UNIT/ML injection pen     Insulin Lispro (1 Unit Dial) 100 UNIT/ML solution pen-injector  Commonly known as: HumaLOG KwikPen            No Known Allergies      Discharge Disposition:      Diet:  Hospital:  Diet Order   Procedures   • Diet Regular; GI Soft, Consistent Carbohydrate       Activity:  Activity Instructions     Activity as Tolerated            Restrictions or Other Recommendations:         CODE STATUS:    Code Status and Medical Interventions:   Ordered at: 03/13/22 0617     Level Of Support Discussed With:    Patient     Code Status (Patient has no pulse and is not breathing):    CPR (Attempt to Resuscitate)     Medical Interventions (Patient has pulse or is breathing):    Full Support       Future Appointments   Date Time Provider Department Center   3/21/2022  1:45 PM Monet Howe APRN [facility]   3/24/2022 11:00 AM Clara Rojas APRN [facility]   4/6/2022  2:45 PM Gerson Ochoa MD [facility]   4/14/2022  1:00 PM Monet Howe APRN [facility]       Additional Instructions for the Follow-ups that You Need to Schedule     Discharge Follow-up with PCP   As directed       Currently Documented PCP:    Monet Howe APRN    PCP Phone Number:    [phone]     Follow Up Details: 1-2 weeks                     Kath Rosado MD  03/19/22      Time Spent on Discharge:  I spent  35  minutes on this discharge activity which included: face-to-face encounter with the patient, reviewing the data in the system, coordination of the care with the nursing staff as well as consultants, documentation, and entering orders.

## 2022-03-19 NOTE — NURSING NOTE
Prior to central line removal, order for the removal of catheter was verified, patient was assessed, necessary materials were gathered and patient was educated regarding procedure .      Patient was positioned supine to ensure that the insertion site was at or below the level of the heart.    Hands were washed, clean gloves were applied and central line dressing was gently removed. Catheter exit site was not cultured.     A new pair of clean gloves were then applied. Insertion site was cleansed with 2% Chlorhexidine swab using a circular motion beginning at the insertion site and moving outward for 30 seconds and allowed to dry.     Clamp on line was present and clamped.     Patient was instructed to perform Valsalva maneuver as catheter was withdrawn.     The central line was grasped at the insertion site and slowly pulled outward parallel to the skin. Resistance was not met.    After central line was completely removed, a sterile 4x4 gauze pad was used to apply light pressure until bleeding stopped. At that time, petroleum-based gauze and a sterile occlusive dressing was applied to exit site.     Patient was instructed to keep dressing in place for at least 24 hours and to remain in a flat or reclining position for 30 minutes post-removal.     Catheter was inspected after removal and was intact. Tip of central line was not sent for culture. Patient tolerated procedure.

## 2022-03-21 ENCOUNTER — TRANSITIONAL CARE MANAGEMENT TELEPHONE ENCOUNTER (OUTPATIENT)
Dept: CALL CENTER | Facility: HOSPITAL | Age: 62
End: 2022-03-21

## 2022-03-21 NOTE — OUTREACH NOTE
Call Center TCM Note    Flowsheet Row Responses   [facility] [place] patient discharged from? [place]   Does the patient have one of the following disease processes/diagnoses(primary or secondary)? Other   TCM attempt successful? Yes   Call start time 1502   Call end time 1516   Discharge diagnosis , Moderate malnutrition, gastroparesis, HTN urgency, UTI   Is patient permission given to speak with other caregiver? No   Meds reviewed with patient/caregiver? Yes   Does the patient have all medications ordered at discharge? N/A  [No new meds ordered at discharge. ]   Is the patient taking all medications as directed (includes completed medication regime)? Yes   Does the patient have a primary care provider?  Yes   Does the patient have an appointment with their PCP within 7 days of discharge? Greater than 7 days   Comments regarding PCP PCP HOWIE Mckenna. Hospital follow up scheduled for 3/29/22  12pm with Fazal BARBA.    What is preventing the patient from scheduling follow up appointments within 7 days of discharge? --  [Patient wanted afternoon appt. First available. ]   Nursing Interventions Verified appointment date/time/provider   Has the patient kept scheduled appointments due by today? No   What is preventing the patient from keeping their appointments? --  [Patient reports that she called to try and cancel the PCP appt today, was put on hold, then was told someone would call back. ]   Nursing Interventions Educated on importance of keeping appointment  [Advised to keep rescheduled hospital follow up appt. ]   Has home health visited the patient within 72 hours of discharge? N/A   Psychosocial issues? No   Comments Patient reports blood sugar 140's today.    Did the patient receive a copy of their discharge instructions? Yes   Nursing interventions Reviewed instructions with patient   What is the patient's perception of their health status since discharge? Improving   Is the  patient/caregiver able to teach back signs and symptoms related to disease process for when to call PCP? Yes   Is the patient/caregiver able to teach back the hierarchy of who to call/visit for symptoms/problems? PCP, Specialist, Home health nurse, Urgent Care, ED, 911 Yes   If the patient is a current smoker, are they able to teach back resources for cessation? Not a smoker   TCM call completed? Yes          Lisy Jain RN    3/21/2022, 15:17 EDT

## 2022-03-22 ENCOUNTER — TELEPHONE (OUTPATIENT)
Dept: GASTROENTEROLOGY | Facility: CLINIC | Age: 62
End: 2022-03-22

## 2022-03-22 DIAGNOSIS — R19.7 DIARRHEA, UNSPECIFIED TYPE: Primary | ICD-10-CM

## 2022-03-24 ENCOUNTER — OFFICE VISIT (OUTPATIENT)
Dept: OBSTETRICS AND GYNECOLOGY | Facility: CLINIC | Age: 62
End: 2022-03-24

## 2022-03-24 VITALS
BODY MASS INDEX: 18.4 KG/M2 | SYSTOLIC BLOOD PRESSURE: 100 MMHG | WEIGHT: 121 LBS | RESPIRATION RATE: 16 BRPM | DIASTOLIC BLOOD PRESSURE: 60 MMHG

## 2022-03-24 DIAGNOSIS — Z01.419 ENCOUNTER FOR WELL WOMAN EXAM WITH ROUTINE GYNECOLOGICAL EXAM: Primary | ICD-10-CM

## 2022-03-24 DIAGNOSIS — N36.2 URETHRAL CARUNCLE: ICD-10-CM

## 2022-03-24 DIAGNOSIS — N95.2 VAGINAL ATROPHY: ICD-10-CM

## 2022-03-24 DIAGNOSIS — B37.31 YEAST VAGINITIS: ICD-10-CM

## 2022-03-24 PROCEDURE — 3015F CERV CANCER SCREEN DOCD: CPT | Performed by: NURSE PRACTITIONER

## 2022-03-24 PROCEDURE — 99396 PREV VISIT EST AGE 40-64: CPT | Performed by: NURSE PRACTITIONER

## 2022-03-24 PROCEDURE — 87210 SMEAR WET MOUNT SALINE/INK: CPT | Performed by: NURSE PRACTITIONER

## 2022-03-24 RX ORDER — METOCLOPRAMIDE HYDROCHLORIDE 15 MG/.07ML
SPRAY NASAL
Status: ON HOLD | COMMUNITY
Start: 2022-03-03 | End: 2022-12-12

## 2022-03-24 RX ORDER — ESTRADIOL 0.1 MG/G
CREAM VAGINAL
Qty: 42.5 G | Refills: 4 | Status: ON HOLD | OUTPATIENT
Start: 2022-03-24 | End: 2023-03-30

## 2022-03-24 NOTE — PROGRESS NOTES
Annual Visit     Patient Name: Thelma Michael  : 1960   MRN: [id number]   Care Team: Patient Care Team:  Monet Howe APRN as PCP - General (Nurse Practitioner)  Markus Stahl MD as Consulting Physician (Gastroenterology)  Magali Pandey APRN as Nurse Practitioner (Gastroenterology)    Chief Complaint:    Chief Complaint   Patient presents with   • Annual Exam       HPI: Thelma Michael is a 61 y.o. year old presenting to be seen for her gynecologic exam - new pt.   Pap smear 2-3 yrs ago - no hx of abnormal results per pt     No vaginal bldg in the last yr   Menopause approx 10 yrs ago     Mammogram 2020 birads 1   Has appt scheduled next month     Colonoscopy 2021 rpt 5 yrs     DEXA 2016 - the report is not available in the chart   Pt states she has never taken medication for her bones, but believes she had osteopenia - was told to take calcium and vit d and she does   States her PCP is following up on this     Hx HTN and hyperlipidemia - well controlled with medications   TIA a few yrs ago   Hx DM type 1 - has had some problems recently with control d/t UTI and gastroparesis   She just got out of the hospital last wk - was there for 7 days   Feeling better   Has since gotten an insulin pump   Her daughter brings her to her appt today   States all 3 of her children live here in [place] and they are a great support system for her         Subjective      /60   Resp 16   Wt 54.9 kg (121 lb)   Breastfeeding No   BMI 18.40 kg/m²     BMI reviewed: Body mass index is 18.4 kg/m².      Objective     Physical Exam    Neuro: alert and oriented to person, place and time   General:  alert; cooperative; well developed; well nourished   Skin:  No suspicious lesions seen   Thyroid: normal to inspection and palpation   Lungs:  breathing is unlabored  clear to auscultation bilaterally   Heart:  regular rate and rhythm, S1, S2 normal, no murmur, click, rub or  gallop  normal apical impulse   Breasts:  Examined in supine position  Symmetric without masses or skin dimpling  Nipples normal without inversion, lesions or discharge  There are no palpable axillary nodes   Abdomen: soft, non-tender; no masses  no umbilical or inguinal hernias are present  no hepato-splenomegaly   Pelvis: Clinical staff was present for exam  External genitalia:  normal appearance of the external genitalia including Bartholin's and Oral's glands. atrophy noted  :  urethral meatus normal; prominent caruncle present;  Vaginal:  atrophic mucosal changes are present; atrophy with pallor noted, mod amt green/white thick d/c noted   Cervix:  normal appearance.  Uterus:  normal size, shape and consistency.  Adnexa:  non palpable bilaterally.  Rectal:  digital rectal exam not performed; anus visually normal appearing.         Assessment / Plan      Assessment  Problems Addressed This Visit    ICD-10-CM ICD-9-CM   1. Encounter for well woman exam with routine gynecological exam  Z01.419 V72.31   2. Vaginal atrophy  N95.2 627.3   3. Urethral caruncle  N36.2 599.3   4. Yeast vaginitis  B37.3 112.1       Plan    Pap smear pending   Discussed monthly SBEs and importance of annual imaging   Keep mammogram appt next month     Discussed vulvovaginal atrophy on exam   Also with prominent urethral caruncle   Discussed mgmt is vaginal estrogen cream 2x/wk at bedtime   Considering her health hx, will use estrace vaginal cream   Discussed this should help prevent vaginal and urinary tract infections     Wet mount = yeast   Discussed txment and prevention   Terazol 7 cream given     Discussed f/u DEXA is due - she will f/u with PCP re: this   Discussed Calcium, 600 mg/ Vit. D, 400 IU daily; regular weight-bearing exercise    AV 1 yr             Follow Up  Return in about 1 year (around 3/24/2023) for Annual physical.  Patient was given instructions and counseling regarding her condition or for health maintenance  advice. Please see specific information pulled into the AVS if appropriate.     Clara Rojas, HOWIE  March 24, 2022  11:46 EDT     No

## 2022-03-26 ENCOUNTER — APPOINTMENT (OUTPATIENT)
Dept: CT IMAGING | Facility: HOSPITAL | Age: 62
End: 2022-03-26

## 2022-03-26 ENCOUNTER — APPOINTMENT (OUTPATIENT)
Dept: GENERAL RADIOLOGY | Facility: HOSPITAL | Age: 62
End: 2022-03-26

## 2022-03-26 ENCOUNTER — HOSPITAL ENCOUNTER (INPATIENT)
Facility: HOSPITAL | Age: 62
LOS: 3 days | Discharge: HOME HEALTH | End: 2022-03-29
Attending: EMERGENCY MEDICINE | Admitting: INTERNAL MEDICINE

## 2022-03-26 DIAGNOSIS — Z74.09 IMPAIRED MOBILITY: ICD-10-CM

## 2022-03-26 DIAGNOSIS — K31.84 GASTROPARESIS: ICD-10-CM

## 2022-03-26 DIAGNOSIS — Z86.39 HISTORY OF TYPE 1 DIABETES MELLITUS: ICD-10-CM

## 2022-03-26 DIAGNOSIS — I16.0 HYPERTENSIVE URGENCY: ICD-10-CM

## 2022-03-26 DIAGNOSIS — A49.8 CLOSTRIDIOIDES DIFFICILE INFECTION: ICD-10-CM

## 2022-03-26 DIAGNOSIS — R10.33 PERIUMBILICAL ABDOMINAL PAIN: Primary | ICD-10-CM

## 2022-03-26 DIAGNOSIS — E86.0 DEHYDRATION: ICD-10-CM

## 2022-03-26 DIAGNOSIS — R11.2 INTRACTABLE NAUSEA AND VOMITING: ICD-10-CM

## 2022-03-26 DIAGNOSIS — Z86.39 HISTORY OF HYPERLIPIDEMIA: ICD-10-CM

## 2022-03-26 DIAGNOSIS — I10 PRIMARY HYPERTENSION: ICD-10-CM

## 2022-03-26 DIAGNOSIS — R11.2 NON-INTRACTABLE VOMITING WITH NAUSEA, UNSPECIFIED VOMITING TYPE: ICD-10-CM

## 2022-03-26 DIAGNOSIS — E87.20 METABOLIC ACIDOSIS: ICD-10-CM

## 2022-03-26 PROBLEM — E83.42 HYPOMAGNESEMIA: Status: ACTIVE | Noted: 2022-03-26

## 2022-03-26 LAB
ADV 40+41 DNA STL QL NAA+NON-PROBE: NOT DETECTED
ALBUMIN SERPL-MCNC: 4.4 G/DL (ref 3.5–5.2)
ALBUMIN/GLOB SERPL: 1.6 G/DL
ALP SERPL-CCNC: 81 U/L (ref 39–117)
ALT SERPL W P-5'-P-CCNC: 24 U/L (ref 1–33)
ANION GAP SERPL CALCULATED.3IONS-SCNC: 16 MMOL/L (ref 5–15)
ANION GAP SERPL CALCULATED.3IONS-SCNC: 18 MMOL/L (ref 5–15)
AST SERPL-CCNC: 20 U/L (ref 1–32)
ASTRO TYP 1-8 RNA STL QL NAA+NON-PROBE: NOT DETECTED
ATMOSPHERIC PRESS: ABNORMAL MM[HG]
B-OH-BUTYR SERPL-SCNC: 2.25 MMOL/L (ref 0.02–0.27)
BACTERIA UR QL AUTO: ABNORMAL /HPF
BASE EXCESS BLDV CALC-SCNC: 0.4 MMOL/L (ref -2–2)
BASOPHILS # BLD AUTO: 0.01 10*3/MM3 (ref 0–0.2)
BASOPHILS NFR BLD AUTO: 0.1 % (ref 0–1.5)
BDY SITE: ABNORMAL
BILIRUB SERPL-MCNC: 0.8 MG/DL (ref 0–1.2)
BILIRUB UR QL STRIP: NEGATIVE
BODY TEMPERATURE: 37 C
BUN SERPL-MCNC: 10 MG/DL (ref 8–23)
BUN SERPL-MCNC: 9 MG/DL (ref 8–23)
BUN/CREAT SERPL: 10.8 (ref 7–25)
BUN/CREAT SERPL: 11.8 (ref 7–25)
C CAYETANENSIS DNA STL QL NAA+NON-PROBE: NOT DETECTED
C COLI+JEJ+UPSA DNA STL QL NAA+NON-PROBE: NOT DETECTED
C DIFF TOX GENS STL QL NAA+PROBE: DETECTED
CALCIUM SPEC-SCNC: 9.2 MG/DL (ref 8.6–10.5)
CALCIUM SPEC-SCNC: 9.7 MG/DL (ref 8.6–10.5)
CHLORIDE SERPL-SCNC: 102 MMOL/L (ref 98–107)
CHLORIDE SERPL-SCNC: 103 MMOL/L (ref 98–107)
CLARITY UR: CLEAR
CO2 BLDA-SCNC: 24.2 MMOL/L (ref 22–33)
CO2 SERPL-SCNC: 20 MMOL/L (ref 22–29)
CO2 SERPL-SCNC: 20 MMOL/L (ref 22–29)
COHGB MFR BLD: 0.9 %
COLOR UR: YELLOW
CREAT SERPL-MCNC: 0.83 MG/DL (ref 0.57–1)
CREAT SERPL-MCNC: 0.85 MG/DL (ref 0.57–1)
CRYPTOSP DNA STL QL NAA+NON-PROBE: NOT DETECTED
D-LACTATE SERPL-SCNC: 0.9 MMOL/L (ref 0.5–2)
D-LACTATE SERPL-SCNC: 1.7 MMOL/L (ref 0.5–2)
D-LACTATE SERPL-SCNC: 2.3 MMOL/L (ref 0.5–2)
DEPRECATED RDW RBC AUTO: 40.9 FL (ref 37–54)
E HISTOLYT DNA STL QL NAA+NON-PROBE: NOT DETECTED
EAEC PAA PLAS AGGR+AATA ST NAA+NON-PRB: DETECTED
EC STX1+STX2 GENES STL QL NAA+NON-PROBE: NOT DETECTED
EGFRCR SERPLBLD CKD-EPI 2021: 77.1 ML/MIN/1.73
EGFRCR SERPLBLD CKD-EPI 2021: 79.3 ML/MIN/1.73
EOSINOPHIL # BLD AUTO: 0 10*3/MM3 (ref 0–0.4)
EOSINOPHIL NFR BLD AUTO: 0 % (ref 0.3–6.2)
EPAP: 0
EPEC EAE GENE STL QL NAA+NON-PROBE: NOT DETECTED
ERYTHROCYTE [DISTWIDTH] IN BLOOD BY AUTOMATED COUNT: 13.1 % (ref 12.3–15.4)
ETEC LTA+ST1A+ST1B TOX ST NAA+NON-PROBE: NOT DETECTED
FLUAV SUBTYP SPEC NAA+PROBE: NOT DETECTED
FLUBV RNA ISLT QL NAA+PROBE: NOT DETECTED
G LAMBLIA DNA STL QL NAA+NON-PROBE: NOT DETECTED
GLOBULIN UR ELPH-MCNC: 2.8 GM/DL
GLUCOSE BLDC GLUCOMTR-MCNC: 126 MG/DL (ref 70–130)
GLUCOSE BLDC GLUCOMTR-MCNC: 141 MG/DL (ref 70–130)
GLUCOSE BLDC GLUCOMTR-MCNC: 145 MG/DL (ref 70–130)
GLUCOSE BLDC GLUCOMTR-MCNC: 149 MG/DL (ref 70–130)
GLUCOSE BLDC GLUCOMTR-MCNC: 149 MG/DL (ref 70–130)
GLUCOSE BLDC GLUCOMTR-MCNC: 152 MG/DL (ref 70–130)
GLUCOSE SERPL-MCNC: 134 MG/DL (ref 65–99)
GLUCOSE SERPL-MCNC: 147 MG/DL (ref 65–99)
GLUCOSE UR STRIP-MCNC: NEGATIVE MG/DL
HCO3 BLDV-SCNC: 23.2 MMOL/L (ref 22–28)
HCT VFR BLD AUTO: 35 % (ref 34–46.6)
HGB BLD-MCNC: 12.3 G/DL (ref 12–15.9)
HGB BLDA-MCNC: 13.4 G/DL (ref 14–18)
HGB UR QL STRIP.AUTO: NEGATIVE
HOLD SPECIMEN: NORMAL
HYALINE CASTS UR QL AUTO: ABNORMAL /LPF
IMM GRANULOCYTES # BLD AUTO: 0.01 10*3/MM3 (ref 0–0.05)
IMM GRANULOCYTES NFR BLD AUTO: 0.1 % (ref 0–0.5)
INHALED O2 CONCENTRATION: 21 %
IPAP: 0
KETONES UR QL STRIP: ABNORMAL
LEUKOCYTE ESTERASE UR QL STRIP.AUTO: NEGATIVE
LIPASE SERPL-CCNC: 14 U/L (ref 13–60)
LIPASE SERPL-CCNC: 14 U/L (ref 13–60)
LYMPHOCYTES # BLD AUTO: 0.91 10*3/MM3 (ref 0.7–3.1)
LYMPHOCYTES NFR BLD AUTO: 12.9 % (ref 19.6–45.3)
MAGNESIUM SERPL-MCNC: 1.6 MG/DL (ref 1.6–2.4)
MAGNESIUM SERPL-MCNC: 1.7 MG/DL (ref 1.6–2.4)
MCH RBC QN AUTO: 30 PG (ref 26.6–33)
MCHC RBC AUTO-ENTMCNC: 35.1 G/DL (ref 31.5–35.7)
MCV RBC AUTO: 85.4 FL (ref 79–97)
METHGB BLD QL: 0.4 %
MODALITY: ABNORMAL
MONOCYTES # BLD AUTO: 0.22 10*3/MM3 (ref 0.1–0.9)
MONOCYTES NFR BLD AUTO: 3.1 % (ref 5–12)
NEUTROPHILS NFR BLD AUTO: 5.92 10*3/MM3 (ref 1.7–7)
NEUTROPHILS NFR BLD AUTO: 83.8 % (ref 42.7–76)
NITRITE UR QL STRIP: NEGATIVE
NOROVIRUS GI+II RNA STL QL NAA+NON-PROBE: NOT DETECTED
NOTE: ABNORMAL
NRBC BLD AUTO-RTO: 0 /100 WBC (ref 0–0.2)
OXYHGB MFR BLDV: 75 %
P SHIGELLOIDES DNA STL QL NAA+NON-PROBE: NOT DETECTED
PAW @ PEAK INSP FLOW SETTING VENT: 0 CMH2O
PCO2 BLDV: 31.4 MM HG (ref 41–51)
PH BLDV: 7.48 PH UNITS (ref 7.31–7.41)
PH UR STRIP.AUTO: 8.5 [PH] (ref 5–8)
PHOSPHATE SERPL-MCNC: 2.6 MG/DL (ref 2.5–4.5)
PLATELET # BLD AUTO: 455 10*3/MM3 (ref 140–450)
PMV BLD AUTO: 9.1 FL (ref 6–12)
PO2 BLDV: 38.5 MM HG (ref 27–53)
POTASSIUM SERPL-SCNC: 3.4 MMOL/L (ref 3.5–5.2)
POTASSIUM SERPL-SCNC: 3.8 MMOL/L (ref 3.5–5.2)
PROT SERPL-MCNC: 7.2 G/DL (ref 6–8.5)
PROT UR QL STRIP: ABNORMAL
RBC # BLD AUTO: 4.1 10*6/MM3 (ref 3.77–5.28)
RBC # UR STRIP: ABNORMAL /HPF
REF LAB TEST METHOD: ABNORMAL
RVA RNA STL QL NAA+NON-PROBE: NOT DETECTED
S ENT+BONG DNA STL QL NAA+NON-PROBE: NOT DETECTED
SAPO I+II+IV+V RNA STL QL NAA+NON-PROBE: NOT DETECTED
SARS-COV-2 RNA PNL SPEC NAA+PROBE: NOT DETECTED
SHIGELLA SP+EIEC IPAH ST NAA+NON-PROBE: NOT DETECTED
SODIUM SERPL-SCNC: 139 MMOL/L (ref 136–145)
SODIUM SERPL-SCNC: 140 MMOL/L (ref 136–145)
SP GR UR STRIP: 1.01 (ref 1–1.03)
SQUAMOUS #/AREA URNS HPF: ABNORMAL /HPF
TOTAL RATE: 0 BREATHS/MINUTE
TROPONIN T SERPL-MCNC: <0.01 NG/ML (ref 0–0.03)
UROBILINOGEN UR QL STRIP: ABNORMAL
V CHOL+PARA+VUL DNA STL QL NAA+NON-PROBE: NOT DETECTED
V CHOLERAE DNA STL QL NAA+NON-PROBE: NOT DETECTED
WBC # UR STRIP: ABNORMAL /HPF
WBC NRBC COR # BLD: 7.07 10*3/MM3 (ref 3.4–10.8)
WHOLE BLOOD HOLD SPECIMEN: NORMAL
WHOLE BLOOD HOLD SPECIMEN: NORMAL
Y ENTEROCOL DNA STL QL NAA+NON-PROBE: NOT DETECTED

## 2022-03-26 PROCEDURE — G0378 HOSPITAL OBSERVATION PER HR: HCPCS

## 2022-03-26 PROCEDURE — 83735 ASSAY OF MAGNESIUM: CPT | Performed by: PHYSICIAN ASSISTANT

## 2022-03-26 PROCEDURE — 83605 ASSAY OF LACTIC ACID: CPT | Performed by: NURSE PRACTITIONER

## 2022-03-26 PROCEDURE — 87045 FECES CULTURE AEROBIC BACT: CPT | Performed by: PHYSICIAN ASSISTANT

## 2022-03-26 PROCEDURE — 83690 ASSAY OF LIPASE: CPT | Performed by: PHYSICIAN ASSISTANT

## 2022-03-26 PROCEDURE — 81001 URINALYSIS AUTO W/SCOPE: CPT | Performed by: PHYSICIAN ASSISTANT

## 2022-03-26 PROCEDURE — 87427 SHIGA-LIKE TOXIN AG IA: CPT | Performed by: PHYSICIAN ASSISTANT

## 2022-03-26 PROCEDURE — 82962 GLUCOSE BLOOD TEST: CPT

## 2022-03-26 PROCEDURE — 87046 STOOL CULTR AEROBIC BACT EA: CPT | Performed by: PHYSICIAN ASSISTANT

## 2022-03-26 PROCEDURE — 25010000002 METOCLOPRAMIDE PER 10 MG: Performed by: PHYSICIAN ASSISTANT

## 2022-03-26 PROCEDURE — 83735 ASSAY OF MAGNESIUM: CPT | Performed by: NURSE PRACTITIONER

## 2022-03-26 PROCEDURE — 83605 ASSAY OF LACTIC ACID: CPT | Performed by: PHYSICIAN ASSISTANT

## 2022-03-26 PROCEDURE — 25010000002 ENOXAPARIN PER 10 MG: Performed by: NURSE PRACTITIONER

## 2022-03-26 PROCEDURE — 87636 SARSCOV2 & INF A&B AMP PRB: CPT | Performed by: INTERNAL MEDICINE

## 2022-03-26 PROCEDURE — 82805 BLOOD GASES W/O2 SATURATION: CPT

## 2022-03-26 PROCEDURE — 99223 1ST HOSP IP/OBS HIGH 75: CPT | Performed by: NURSE PRACTITIONER

## 2022-03-26 PROCEDURE — 84100 ASSAY OF PHOSPHORUS: CPT | Performed by: NURSE PRACTITIONER

## 2022-03-26 PROCEDURE — 84484 ASSAY OF TROPONIN QUANT: CPT

## 2022-03-26 PROCEDURE — 82010 KETONE BODYS QUAN: CPT | Performed by: PHYSICIAN ASSISTANT

## 2022-03-26 PROCEDURE — 93005 ELECTROCARDIOGRAM TRACING: CPT

## 2022-03-26 PROCEDURE — P9612 CATHETERIZE FOR URINE SPEC: HCPCS

## 2022-03-26 PROCEDURE — 83690 ASSAY OF LIPASE: CPT

## 2022-03-26 PROCEDURE — 85025 COMPLETE CBC W/AUTO DIFF WBC: CPT

## 2022-03-26 PROCEDURE — 93010 ELECTROCARDIOGRAM REPORT: CPT | Performed by: INTERNAL MEDICINE

## 2022-03-26 PROCEDURE — 99285 EMERGENCY DEPT VISIT HI MDM: CPT

## 2022-03-26 PROCEDURE — 71045 X-RAY EXAM CHEST 1 VIEW: CPT

## 2022-03-26 PROCEDURE — 99223 1ST HOSP IP/OBS HIGH 75: CPT | Performed by: INTERNAL MEDICINE

## 2022-03-26 PROCEDURE — 87493 C DIFF AMPLIFIED PROBE: CPT | Performed by: INTERNAL MEDICINE

## 2022-03-26 PROCEDURE — 74176 CT ABD & PELVIS W/O CONTRAST: CPT

## 2022-03-26 PROCEDURE — 0097U HC BIOFIRE FILMARRAY GI PANEL: CPT | Performed by: PHYSICIAN ASSISTANT

## 2022-03-26 PROCEDURE — 80053 COMPREHEN METABOLIC PANEL: CPT

## 2022-03-26 RX ORDER — ASPIRIN 81 MG/1
81 TABLET ORAL DAILY
Status: DISCONTINUED | OUTPATIENT
Start: 2022-03-26 | End: 2022-03-29 | Stop reason: HOSPADM

## 2022-03-26 RX ORDER — DONEPEZIL HYDROCHLORIDE 5 MG/1
5 TABLET, FILM COATED ORAL NIGHTLY
Status: DISCONTINUED | OUTPATIENT
Start: 2022-03-26 | End: 2022-03-29 | Stop reason: HOSPADM

## 2022-03-26 RX ORDER — VANCOMYCIN HYDROCHLORIDE 125 MG/1
125 CAPSULE ORAL EVERY 6 HOURS SCHEDULED
Status: DISCONTINUED | OUTPATIENT
Start: 2022-03-26 | End: 2022-03-29 | Stop reason: HOSPADM

## 2022-03-26 RX ORDER — DEXTROSE MONOHYDRATE 25 G/50ML
25 INJECTION, SOLUTION INTRAVENOUS
Status: DISCONTINUED | OUTPATIENT
Start: 2022-03-26 | End: 2022-03-26 | Stop reason: SDUPTHER

## 2022-03-26 RX ORDER — FERROUS SULFATE 325(65) MG
325 TABLET ORAL
Status: DISCONTINUED | OUTPATIENT
Start: 2022-03-26 | End: 2022-03-29 | Stop reason: HOSPADM

## 2022-03-26 RX ORDER — TRAZODONE HYDROCHLORIDE 50 MG/1
50 TABLET ORAL NIGHTLY PRN
Status: DISCONTINUED | OUTPATIENT
Start: 2022-03-26 | End: 2022-03-29 | Stop reason: HOSPADM

## 2022-03-26 RX ORDER — ACETAMINOPHEN 325 MG/1
325 TABLET ORAL EVERY 6 HOURS PRN
Status: DISCONTINUED | OUTPATIENT
Start: 2022-03-26 | End: 2022-03-29 | Stop reason: HOSPADM

## 2022-03-26 RX ORDER — FLUOXETINE HYDROCHLORIDE 20 MG/1
20 CAPSULE ORAL DAILY
Status: DISCONTINUED | OUTPATIENT
Start: 2022-03-26 | End: 2022-03-29 | Stop reason: HOSPADM

## 2022-03-26 RX ORDER — CHOLECALCIFEROL (VITAMIN D3) 125 MCG
5 CAPSULE ORAL NIGHTLY PRN
Status: DISCONTINUED | OUTPATIENT
Start: 2022-03-26 | End: 2022-03-29 | Stop reason: HOSPADM

## 2022-03-26 RX ORDER — SODIUM CHLORIDE 0.9 % (FLUSH) 0.9 %
10 SYRINGE (ML) INJECTION AS NEEDED
Status: DISCONTINUED | OUTPATIENT
Start: 2022-03-26 | End: 2022-03-29 | Stop reason: HOSPADM

## 2022-03-26 RX ORDER — TERAZOSIN 1 MG/1
1 CAPSULE ORAL NIGHTLY
Status: DISCONTINUED | OUTPATIENT
Start: 2022-03-26 | End: 2022-03-29 | Stop reason: HOSPADM

## 2022-03-26 RX ORDER — AMOXICILLIN 250 MG
2 CAPSULE ORAL 2 TIMES DAILY PRN
Status: DISCONTINUED | OUTPATIENT
Start: 2022-03-26 | End: 2022-03-27

## 2022-03-26 RX ORDER — NICOTINE POLACRILEX 4 MG
15 LOZENGE BUCCAL
Status: DISCONTINUED | OUTPATIENT
Start: 2022-03-26 | End: 2022-03-29 | Stop reason: HOSPADM

## 2022-03-26 RX ORDER — PANTOPRAZOLE SODIUM 40 MG/1
40 TABLET, DELAYED RELEASE ORAL
Status: DISCONTINUED | OUTPATIENT
Start: 2022-03-26 | End: 2022-03-26 | Stop reason: CLARIF

## 2022-03-26 RX ORDER — DEXTROSE MONOHYDRATE 25 G/50ML
25 INJECTION, SOLUTION INTRAVENOUS
Status: DISCONTINUED | OUTPATIENT
Start: 2022-03-26 | End: 2022-03-29 | Stop reason: HOSPADM

## 2022-03-26 RX ORDER — SODIUM CHLORIDE 9 MG/ML
100 INJECTION, SOLUTION INTRAVENOUS CONTINUOUS
Status: DISCONTINUED | OUTPATIENT
Start: 2022-03-26 | End: 2022-03-29 | Stop reason: HOSPADM

## 2022-03-26 RX ORDER — METOCLOPRAMIDE HYDROCHLORIDE 5 MG/ML
10 INJECTION INTRAMUSCULAR; INTRAVENOUS ONCE
Status: COMPLETED | OUTPATIENT
Start: 2022-03-26 | End: 2022-03-26

## 2022-03-26 RX ORDER — FAMOTIDINE 20 MG/1
20 TABLET, FILM COATED ORAL
Status: DISCONTINUED | OUTPATIENT
Start: 2022-03-26 | End: 2022-03-29 | Stop reason: HOSPADM

## 2022-03-26 RX ORDER — SODIUM CHLORIDE 0.9 % (FLUSH) 0.9 %
10 SYRINGE (ML) INJECTION EVERY 12 HOURS SCHEDULED
Status: DISCONTINUED | OUTPATIENT
Start: 2022-03-26 | End: 2022-03-29 | Stop reason: HOSPADM

## 2022-03-26 RX ORDER — ATORVASTATIN CALCIUM 40 MG/1
80 TABLET, FILM COATED ORAL NIGHTLY
Status: DISCONTINUED | OUTPATIENT
Start: 2022-03-26 | End: 2022-03-29 | Stop reason: HOSPADM

## 2022-03-26 RX ORDER — AMLODIPINE BESYLATE 10 MG/1
10 TABLET ORAL DAILY
Status: DISCONTINUED | OUTPATIENT
Start: 2022-03-26 | End: 2022-03-29 | Stop reason: HOSPADM

## 2022-03-26 RX ORDER — NICOTINE POLACRILEX 4 MG
15 LOZENGE BUCCAL
Status: DISCONTINUED | OUTPATIENT
Start: 2022-03-26 | End: 2022-03-26 | Stop reason: SDUPTHER

## 2022-03-26 RX ORDER — DROPERIDOL 2.5 MG/ML
2.5 INJECTION, SOLUTION INTRAMUSCULAR; INTRAVENOUS ONCE
Status: DISCONTINUED | OUTPATIENT
Start: 2022-03-26 | End: 2022-03-26 | Stop reason: RX

## 2022-03-26 RX ADMIN — SODIUM CHLORIDE 1000 ML: 9 INJECTION, SOLUTION INTRAVENOUS at 06:07

## 2022-03-26 RX ADMIN — Medication 10 ML: at 20:13

## 2022-03-26 RX ADMIN — Medication 10 ML: at 09:01

## 2022-03-26 RX ADMIN — SODIUM CHLORIDE 1000 ML: 9 INJECTION, SOLUTION INTRAVENOUS at 03:39

## 2022-03-26 RX ADMIN — VANCOMYCIN HYDROCHLORIDE 125 MG: 125 CAPSULE ORAL at 17:55

## 2022-03-26 RX ADMIN — PANTOPRAZOLE SODIUM 40 MG: 40 TABLET, DELAYED RELEASE ORAL at 09:00

## 2022-03-26 RX ADMIN — AMLODIPINE BESYLATE 10 MG: 10 TABLET ORAL at 08:59

## 2022-03-26 RX ADMIN — ATORVASTATIN CALCIUM 80 MG: 40 TABLET, FILM COATED ORAL at 20:13

## 2022-03-26 RX ADMIN — FAMOTIDINE 20 MG: 20 TABLET, FILM COATED ORAL at 17:55

## 2022-03-26 RX ADMIN — METOCLOPRAMIDE 10 MG: 5 INJECTION, SOLUTION INTRAMUSCULAR; INTRAVENOUS at 03:39

## 2022-03-26 RX ADMIN — NICARDIPINE HYDROCHLORIDE 5 MG/HR: 25 INJECTION, SOLUTION INTRAVENOUS at 10:46

## 2022-03-26 RX ADMIN — Medication 325 MG: at 08:59

## 2022-03-26 RX ADMIN — VANCOMYCIN HYDROCHLORIDE 125 MG: 125 CAPSULE ORAL at 10:46

## 2022-03-26 RX ADMIN — SODIUM CHLORIDE 100 ML/HR: 9 INJECTION, SOLUTION INTRAVENOUS at 09:01

## 2022-03-26 RX ADMIN — Medication 5 MG: at 23:08

## 2022-03-26 RX ADMIN — ENOXAPARIN SODIUM 40 MG: 40 INJECTION SUBCUTANEOUS at 09:01

## 2022-03-26 RX ADMIN — ASPIRIN 81 MG: 81 TABLET, COATED ORAL at 08:59

## 2022-03-26 RX ADMIN — FLUOXETINE HYDROCHLORIDE 20 MG: 20 CAPSULE ORAL at 08:59

## 2022-03-26 RX ADMIN — DONEPEZIL HYDROCHLORIDE 5 MG: 5 TABLET ORAL at 20:13

## 2022-03-26 RX ADMIN — TERAZOSIN HYDROCHLORIDE 1 MG: 1 CAPSULE ORAL at 20:13

## 2022-03-26 RX ADMIN — NICARDIPINE HYDROCHLORIDE 5 MG/HR: 25 INJECTION, SOLUTION INTRAVENOUS at 05:55

## 2022-03-26 RX ADMIN — VANCOMYCIN HYDROCHLORIDE 125 MG: 125 CAPSULE ORAL at 23:08

## 2022-03-26 RX ADMIN — TRAZODONE HYDROCHLORIDE 50 MG: 50 TABLET ORAL at 23:08

## 2022-03-27 LAB
GLUCOSE BLDC GLUCOMTR-MCNC: 121 MG/DL (ref 70–130)
GLUCOSE BLDC GLUCOMTR-MCNC: 130 MG/DL (ref 70–130)
GLUCOSE BLDC GLUCOMTR-MCNC: 153 MG/DL (ref 70–130)
GLUCOSE BLDC GLUCOMTR-MCNC: 161 MG/DL (ref 70–130)
QT INTERVAL: 394 MS
QTC INTERVAL: 492 MS

## 2022-03-27 PROCEDURE — 82962 GLUCOSE BLOOD TEST: CPT

## 2022-03-27 PROCEDURE — G0378 HOSPITAL OBSERVATION PER HR: HCPCS

## 2022-03-27 PROCEDURE — 25010000002 ONDANSETRON PER 1 MG: Performed by: INTERNAL MEDICINE

## 2022-03-27 PROCEDURE — 99232 SBSQ HOSP IP/OBS MODERATE 35: CPT | Performed by: INTERNAL MEDICINE

## 2022-03-27 PROCEDURE — 25010000002 ENOXAPARIN PER 10 MG: Performed by: NURSE PRACTITIONER

## 2022-03-27 RX ORDER — ONDANSETRON 2 MG/ML
4 INJECTION INTRAMUSCULAR; INTRAVENOUS EVERY 6 HOURS PRN
Status: DISCONTINUED | OUTPATIENT
Start: 2022-03-27 | End: 2022-03-29 | Stop reason: HOSPADM

## 2022-03-27 RX ORDER — TRAMADOL HYDROCHLORIDE 50 MG/1
50 TABLET ORAL EVERY 6 HOURS PRN
Status: DISCONTINUED | OUTPATIENT
Start: 2022-03-27 | End: 2022-03-29 | Stop reason: HOSPADM

## 2022-03-27 RX ADMIN — ACETAMINOPHEN 325 MG: 325 TABLET ORAL at 17:45

## 2022-03-27 RX ADMIN — ONDANSETRON 4 MG: 2 INJECTION INTRAMUSCULAR; INTRAVENOUS at 14:25

## 2022-03-27 RX ADMIN — DONEPEZIL HYDROCHLORIDE 5 MG: 5 TABLET ORAL at 20:19

## 2022-03-27 RX ADMIN — AMLODIPINE BESYLATE 10 MG: 10 TABLET ORAL at 08:06

## 2022-03-27 RX ADMIN — FLUOXETINE HYDROCHLORIDE 20 MG: 20 CAPSULE ORAL at 08:05

## 2022-03-27 RX ADMIN — ATORVASTATIN CALCIUM 80 MG: 40 TABLET, FILM COATED ORAL at 20:19

## 2022-03-27 RX ADMIN — Medication 325 MG: at 08:05

## 2022-03-27 RX ADMIN — TERAZOSIN HYDROCHLORIDE 1 MG: 1 CAPSULE ORAL at 20:19

## 2022-03-27 RX ADMIN — SODIUM CHLORIDE 100 ML/HR: 9 INJECTION, SOLUTION INTRAVENOUS at 02:53

## 2022-03-27 RX ADMIN — Medication 5 MG: at 20:19

## 2022-03-27 RX ADMIN — FAMOTIDINE 20 MG: 20 TABLET, FILM COATED ORAL at 17:45

## 2022-03-27 RX ADMIN — VANCOMYCIN HYDROCHLORIDE 125 MG: 125 CAPSULE ORAL at 17:45

## 2022-03-27 RX ADMIN — TRAZODONE HYDROCHLORIDE 50 MG: 50 TABLET ORAL at 20:18

## 2022-03-27 RX ADMIN — ASPIRIN 81 MG: 81 TABLET, COATED ORAL at 08:05

## 2022-03-27 RX ADMIN — FAMOTIDINE 20 MG: 20 TABLET, FILM COATED ORAL at 06:48

## 2022-03-27 RX ADMIN — ENOXAPARIN SODIUM 40 MG: 40 INJECTION SUBCUTANEOUS at 08:06

## 2022-03-27 RX ADMIN — VANCOMYCIN HYDROCHLORIDE 125 MG: 125 CAPSULE ORAL at 14:25

## 2022-03-27 RX ADMIN — VANCOMYCIN HYDROCHLORIDE 125 MG: 125 CAPSULE ORAL at 06:48

## 2022-03-27 RX ADMIN — TRAMADOL HYDROCHLORIDE 50 MG: 50 TABLET, COATED ORAL at 21:46

## 2022-03-28 ENCOUNTER — READMISSION MANAGEMENT (OUTPATIENT)
Dept: CALL CENTER | Facility: HOSPITAL | Age: 62
End: 2022-03-28

## 2022-03-28 PROBLEM — R10.33 PERIUMBILICAL ABDOMINAL PAIN: Status: ACTIVE | Noted: 2022-03-28

## 2022-03-28 LAB
ALBUMIN SERPL-MCNC: 3.2 G/DL (ref 3.5–5.2)
ALBUMIN/GLOB SERPL: 1.4 G/DL
ALP SERPL-CCNC: 58 U/L (ref 39–117)
ALT SERPL W P-5'-P-CCNC: 19 U/L (ref 1–33)
ANION GAP SERPL CALCULATED.3IONS-SCNC: 7 MMOL/L (ref 5–15)
AST SERPL-CCNC: 18 U/L (ref 1–32)
BASOPHILS # BLD AUTO: 0.02 10*3/MM3 (ref 0–0.2)
BASOPHILS NFR BLD AUTO: 0.3 % (ref 0–1.5)
BILIRUB SERPL-MCNC: 0.5 MG/DL (ref 0–1.2)
BUN SERPL-MCNC: 5 MG/DL (ref 8–23)
BUN/CREAT SERPL: 6.6 (ref 7–25)
CALCIUM SPEC-SCNC: 8.4 MG/DL (ref 8.6–10.5)
CHLORIDE SERPL-SCNC: 108 MMOL/L (ref 98–107)
CO2 SERPL-SCNC: 24 MMOL/L (ref 22–29)
CREAT SERPL-MCNC: 0.76 MG/DL (ref 0.57–1)
DEPRECATED RDW RBC AUTO: 39.6 FL (ref 37–54)
EGFRCR SERPLBLD CKD-EPI 2021: 88.2 ML/MIN/1.73
EOSINOPHIL # BLD AUTO: 0.09 10*3/MM3 (ref 0–0.4)
EOSINOPHIL NFR BLD AUTO: 1.5 % (ref 0.3–6.2)
ERYTHROCYTE [DISTWIDTH] IN BLOOD BY AUTOMATED COUNT: 12.9 % (ref 12.3–15.4)
GLOBULIN UR ELPH-MCNC: 2.3 GM/DL
GLUCOSE BLDC GLUCOMTR-MCNC: 110 MG/DL (ref 70–130)
GLUCOSE BLDC GLUCOMTR-MCNC: 142 MG/DL (ref 70–130)
GLUCOSE BLDC GLUCOMTR-MCNC: 151 MG/DL (ref 70–130)
GLUCOSE BLDC GLUCOMTR-MCNC: 183 MG/DL (ref 70–130)
GLUCOSE SERPL-MCNC: 189 MG/DL (ref 65–99)
HCT VFR BLD AUTO: 28 % (ref 34–46.6)
HGB BLD-MCNC: 10 G/DL (ref 12–15.9)
IMM GRANULOCYTES # BLD AUTO: 0.01 10*3/MM3 (ref 0–0.05)
IMM GRANULOCYTES NFR BLD AUTO: 0.2 % (ref 0–0.5)
LYMPHOCYTES # BLD AUTO: 1.76 10*3/MM3 (ref 0.7–3.1)
LYMPHOCYTES NFR BLD AUTO: 30.1 % (ref 19.6–45.3)
MCH RBC QN AUTO: 30.1 PG (ref 26.6–33)
MCHC RBC AUTO-ENTMCNC: 35.7 G/DL (ref 31.5–35.7)
MCV RBC AUTO: 84.3 FL (ref 79–97)
MONOCYTES # BLD AUTO: 0.3 10*3/MM3 (ref 0.1–0.9)
MONOCYTES NFR BLD AUTO: 5.1 % (ref 5–12)
NEUTROPHILS NFR BLD AUTO: 3.67 10*3/MM3 (ref 1.7–7)
NEUTROPHILS NFR BLD AUTO: 62.8 % (ref 42.7–76)
NRBC BLD AUTO-RTO: 0 /100 WBC (ref 0–0.2)
PLATELET # BLD AUTO: 314 10*3/MM3 (ref 140–450)
PMV BLD AUTO: 9.6 FL (ref 6–12)
POTASSIUM SERPL-SCNC: 3.3 MMOL/L (ref 3.5–5.2)
PROT SERPL-MCNC: 5.5 G/DL (ref 6–8.5)
RBC # BLD AUTO: 3.32 10*6/MM3 (ref 3.77–5.28)
SODIUM SERPL-SCNC: 139 MMOL/L (ref 136–145)
WBC NRBC COR # BLD: 5.85 10*3/MM3 (ref 3.4–10.8)

## 2022-03-28 PROCEDURE — 99232 SBSQ HOSP IP/OBS MODERATE 35: CPT | Performed by: NURSE PRACTITIONER

## 2022-03-28 PROCEDURE — 85025 COMPLETE CBC W/AUTO DIFF WBC: CPT | Performed by: INTERNAL MEDICINE

## 2022-03-28 PROCEDURE — 25010000002 ENOXAPARIN PER 10 MG: Performed by: NURSE PRACTITIONER

## 2022-03-28 PROCEDURE — 82962 GLUCOSE BLOOD TEST: CPT

## 2022-03-28 PROCEDURE — 80053 COMPREHEN METABOLIC PANEL: CPT | Performed by: INTERNAL MEDICINE

## 2022-03-28 PROCEDURE — 99232 SBSQ HOSP IP/OBS MODERATE 35: CPT | Performed by: INTERNAL MEDICINE

## 2022-03-28 RX ORDER — POTASSIUM CHLORIDE 1.5 G/1.77G
40 POWDER, FOR SOLUTION ORAL AS NEEDED
Status: DISCONTINUED | OUTPATIENT
Start: 2022-03-28 | End: 2022-03-29 | Stop reason: HOSPADM

## 2022-03-28 RX ORDER — POTASSIUM CHLORIDE 750 MG/1
40 CAPSULE, EXTENDED RELEASE ORAL AS NEEDED
Status: DISCONTINUED | OUTPATIENT
Start: 2022-03-28 | End: 2022-03-29 | Stop reason: HOSPADM

## 2022-03-28 RX ORDER — POTASSIUM CHLORIDE 7.45 MG/ML
10 INJECTION INTRAVENOUS
Status: DISCONTINUED | OUTPATIENT
Start: 2022-03-28 | End: 2022-03-29 | Stop reason: HOSPADM

## 2022-03-28 RX ADMIN — TRAZODONE HYDROCHLORIDE 50 MG: 50 TABLET ORAL at 23:01

## 2022-03-28 RX ADMIN — VANCOMYCIN HYDROCHLORIDE 125 MG: 125 CAPSULE ORAL at 11:59

## 2022-03-28 RX ADMIN — TRAMADOL HYDROCHLORIDE 50 MG: 50 TABLET, COATED ORAL at 09:05

## 2022-03-28 RX ADMIN — TERAZOSIN HYDROCHLORIDE 1 MG: 1 CAPSULE ORAL at 20:56

## 2022-03-28 RX ADMIN — ENOXAPARIN SODIUM 40 MG: 40 INJECTION SUBCUTANEOUS at 09:08

## 2022-03-28 RX ADMIN — ACETAMINOPHEN 325 MG: 325 TABLET ORAL at 12:01

## 2022-03-28 RX ADMIN — VANCOMYCIN HYDROCHLORIDE 125 MG: 125 CAPSULE ORAL at 16:59

## 2022-03-28 RX ADMIN — SODIUM CHLORIDE 100 ML/HR: 9 INJECTION, SOLUTION INTRAVENOUS at 00:05

## 2022-03-28 RX ADMIN — POTASSIUM CHLORIDE 40 MEQ: 750 CAPSULE, EXTENDED RELEASE ORAL at 16:58

## 2022-03-28 RX ADMIN — Medication 325 MG: at 09:05

## 2022-03-28 RX ADMIN — DONEPEZIL HYDROCHLORIDE 5 MG: 5 TABLET ORAL at 20:56

## 2022-03-28 RX ADMIN — VANCOMYCIN HYDROCHLORIDE 125 MG: 125 CAPSULE ORAL at 23:01

## 2022-03-28 RX ADMIN — Medication 10 ML: at 21:00

## 2022-03-28 RX ADMIN — FLUOXETINE HYDROCHLORIDE 20 MG: 20 CAPSULE ORAL at 09:05

## 2022-03-28 RX ADMIN — FAMOTIDINE 20 MG: 20 TABLET, FILM COATED ORAL at 09:05

## 2022-03-28 RX ADMIN — TRAMADOL HYDROCHLORIDE 50 MG: 50 TABLET, COATED ORAL at 15:19

## 2022-03-28 RX ADMIN — VANCOMYCIN HYDROCHLORIDE 125 MG: 125 CAPSULE ORAL at 00:05

## 2022-03-28 RX ADMIN — FAMOTIDINE 20 MG: 20 TABLET, FILM COATED ORAL at 16:59

## 2022-03-28 RX ADMIN — SODIUM CHLORIDE 100 ML/HR: 9 INJECTION, SOLUTION INTRAVENOUS at 10:17

## 2022-03-28 RX ADMIN — ASPIRIN 81 MG: 81 TABLET, COATED ORAL at 09:05

## 2022-03-28 RX ADMIN — TRAMADOL HYDROCHLORIDE 50 MG: 50 TABLET, COATED ORAL at 23:01

## 2022-03-28 RX ADMIN — AMLODIPINE BESYLATE 10 MG: 10 TABLET ORAL at 09:05

## 2022-03-28 RX ADMIN — POTASSIUM CHLORIDE 40 MEQ: 750 CAPSULE, EXTENDED RELEASE ORAL at 23:01

## 2022-03-28 RX ADMIN — VANCOMYCIN HYDROCHLORIDE 125 MG: 125 CAPSULE ORAL at 05:44

## 2022-03-28 RX ADMIN — SODIUM CHLORIDE 100 ML/HR: 9 INJECTION, SOLUTION INTRAVENOUS at 17:05

## 2022-03-28 RX ADMIN — ATORVASTATIN CALCIUM 80 MG: 40 TABLET, FILM COATED ORAL at 20:56

## 2022-03-28 NOTE — OUTREACH NOTE
Medical Week 2 Survey    Flowsheet Row Responses   [facility] patient discharged from? [facility]   Does the patient have one of the following disease processes/diagnoses(primary or secondary)? Other   Week 2 attempt successful? No   Revoke Readmitted          YOLANDA BRAN - Registered Nurse

## 2022-03-29 ENCOUNTER — READMISSION MANAGEMENT (OUTPATIENT)
Dept: CALL CENTER | Facility: HOSPITAL | Age: 62
End: 2022-03-29

## 2022-03-29 ENCOUNTER — HOME HEALTH ADMISSION (OUTPATIENT)
Dept: HOME HEALTH SERVICES | Facility: HOME HEALTHCARE | Age: 62
End: 2022-03-29

## 2022-03-29 VITALS
HEIGHT: 68 IN | DIASTOLIC BLOOD PRESSURE: 72 MMHG | BODY MASS INDEX: 17.91 KG/M2 | SYSTOLIC BLOOD PRESSURE: 128 MMHG | OXYGEN SATURATION: 98 % | RESPIRATION RATE: 18 BRPM | HEART RATE: 77 BPM | WEIGHT: 118.2 LBS | TEMPERATURE: 98.4 F

## 2022-03-29 LAB
GLUCOSE BLDC GLUCOMTR-MCNC: 122 MG/DL (ref 70–130)
GLUCOSE BLDC GLUCOMTR-MCNC: 157 MG/DL (ref 70–130)

## 2022-03-29 PROCEDURE — 99238 HOSP IP/OBS DSCHRG MGMT 30/<: CPT | Performed by: INTERNAL MEDICINE

## 2022-03-29 PROCEDURE — 82962 GLUCOSE BLOOD TEST: CPT

## 2022-03-29 PROCEDURE — 99232 SBSQ HOSP IP/OBS MODERATE 35: CPT | Performed by: NURSE PRACTITIONER

## 2022-03-29 PROCEDURE — 25010000002 ENOXAPARIN PER 10 MG: Performed by: NURSE PRACTITIONER

## 2022-03-29 PROCEDURE — 97165 OT EVAL LOW COMPLEX 30 MIN: CPT

## 2022-03-29 RX ORDER — VANCOMYCIN HYDROCHLORIDE 125 MG/1
125 CAPSULE ORAL EVERY 6 HOURS SCHEDULED
Qty: 44 CAPSULE | Refills: 0 | Status: SHIPPED | OUTPATIENT
Start: 2022-03-29 | End: 2022-04-09

## 2022-03-29 RX ORDER — ONDANSETRON 4 MG/1
4 TABLET, FILM COATED ORAL EVERY 6 HOURS PRN
Qty: 15 TABLET | Refills: 0 | Status: SHIPPED | OUTPATIENT
Start: 2022-03-29 | End: 2022-04-28

## 2022-03-29 RX ORDER — VANCOMYCIN HYDROCHLORIDE 125 MG/1
125 CAPSULE ORAL EVERY 6 HOURS SCHEDULED
Qty: 44 CAPSULE | Refills: 0 | Status: SHIPPED | OUTPATIENT
Start: 2022-03-29 | End: 2022-03-29 | Stop reason: SDUPTHER

## 2022-03-29 RX ADMIN — ENOXAPARIN SODIUM 40 MG: 40 INJECTION SUBCUTANEOUS at 08:59

## 2022-03-29 RX ADMIN — Medication 325 MG: at 08:58

## 2022-03-29 RX ADMIN — FLUOXETINE HYDROCHLORIDE 20 MG: 20 CAPSULE ORAL at 08:57

## 2022-03-29 RX ADMIN — VANCOMYCIN HYDROCHLORIDE 125 MG: 125 CAPSULE ORAL at 05:33

## 2022-03-29 RX ADMIN — VANCOMYCIN HYDROCHLORIDE 125 MG: 125 CAPSULE ORAL at 11:19

## 2022-03-29 RX ADMIN — SODIUM CHLORIDE 100 ML/HR: 9 INJECTION, SOLUTION INTRAVENOUS at 03:12

## 2022-03-29 RX ADMIN — TRAMADOL HYDROCHLORIDE 50 MG: 50 TABLET, COATED ORAL at 08:57

## 2022-03-29 RX ADMIN — ASPIRIN 81 MG: 81 TABLET, COATED ORAL at 08:58

## 2022-03-29 RX ADMIN — AMLODIPINE BESYLATE 10 MG: 10 TABLET ORAL at 08:58

## 2022-03-29 RX ADMIN — FAMOTIDINE 20 MG: 20 TABLET, FILM COATED ORAL at 08:58

## 2022-03-29 NOTE — OUTREACH NOTE
Prep Survey    Flowsheet Row Responses   [facility] patient discharged from? [place]   Is LACE score < 7 ? No   Emergency Room discharge w/ pulse ox? No   Eligibility [facility]   Date of Admission 03/26/22   Date of Discharge 03/29/22   Discharge Disposition [place] [facility]   Discharge diagnosis Periumbilical abdominal pain Intractable nausea and vomiting    Does the patient have one of the following disease processes/diagnoses(primary or secondary)? Other   Does the patient have Home health ordered? Yes   What is the Home health agency?  [facility]    Is there a DME ordered? Yes   What DME was ordered? [facility]   Prep survey completed? Yes          YOLANDA BRAN - Registered Nurse

## 2022-03-30 ENCOUNTER — TRANSITIONAL CARE MANAGEMENT TELEPHONE ENCOUNTER (OUTPATIENT)
Dept: CALL CENTER | Facility: HOSPITAL | Age: 62
End: 2022-03-30

## 2022-03-30 LAB
BACTERIA SPEC CULT: NORMAL
BACTERIA SPEC CULT: NORMAL
CAMPYLOBACTER STL CULT: NORMAL
E COLI SXT STL QL IA: NEGATIVE
SALM + SHIG STL CULT: NORMAL

## 2022-03-30 NOTE — OUTREACH NOTE
Call Center TCM Note    Flowsheet Row Responses   [facility] patient discharged from? [facility]   Does the patient have one of the following disease processes/diagnoses(primary or secondary)? Other   TCM attempt successful? Yes   Call start time 1049   Call end time 1057   Discharge diagnosis Periumbilical abdominal pain Intractable nausea and vomiting    Is patient permission given to speak with other caregiver? Yes   List who call center can speak with daughter- Kelsy   Person spoke with today (if not patient) and relationship daughter   Does the patient have all medications ordered at discharge? No   Prescription comments daughter is saying that patient did not recieve all of her medication at discharge, will sent a note to case management   Comments regarding appointments f/u with endocrinology on 4/6   Does the patient have a primary care provider?  Yes   Does the patient have an appointment with their PCP within 7 days of discharge? Greater than 7 days   Comments regarding PCP hospital f/u with Fazal Lucas PA-C on 4/7   Nursing Interventions Verified appointment date/time/provider   Has the patient kept scheduled appointments due by today? N/A   What is the Home health agency?   [facility]    Has home health visited the patient within 72 hours of discharge? No   What DME was ordered? [facility] [place] [facility]   Has all DME been delivered? Yes   Psychosocial issues? No   Did the patient receive a copy of their discharge instructions? Yes   Nursing interventions Reviewed instructions with patient  [with daughter]   What is the patient's perception of their health status since discharge? Improving   Is the patient/caregiver able to teach back signs and symptoms related to disease process for when to call PCP? Yes   Is the patient/caregiver able to teach back signs and symptoms related to disease process for when to call 911? Yes   Is the patient/caregiver able to teach back the hierarchy of  who to call/visit for symptoms/problems? PCP, Specialist, Home health nurse, Urgent Care, ED, 911 Yes   TCM call completed? Yes   Wrap up additional comments Per daughter, patient is doing well today, confirmed hospital f/u appt at PCP office for 4/7.          Irma Lamas RN    3/30/2022, 10:57 EDT

## 2022-03-31 ENCOUNTER — TELEPHONE (OUTPATIENT)
Dept: GASTROENTEROLOGY | Facility: CLINIC | Age: 62
End: 2022-03-31

## 2022-03-31 ENCOUNTER — HOME CARE VISIT (OUTPATIENT)
Dept: HOME HEALTH SERVICES | Facility: HOME HEALTHCARE | Age: 62
End: 2022-03-31

## 2022-03-31 NOTE — TELEPHONE ENCOUNTER
I guess give her the option of either picking it up now at [facility] or waiting 3 days at [facility].

## 2022-03-31 NOTE — TELEPHONE ENCOUNTER
Patient called and LVM on nurse line about motility clinic GI appointment. Spoke with carmen Crystal going to take about 2-3 more weeks.

## 2022-04-01 NOTE — CASE COMMUNICATION
Pt is a non-admit to home care. She is ambulatory without a device level surfaces, is able to negotiate the flight of steps to her home without assist and demonstrates balance WNL, She understands that in order to improve her activity tolerance s/p these recent hospitalizations that she needs to return to her normal daily activities and report she is working on that. She declines any need for diabetes specific or c-diff specific educati on. She is working on getting the rx for c-diff filled (was very expensive at one pharmacy and another pharmacy did not have it in stock, hospitalist and case management staff aware and trying to get it filled ASAP). Edu provided that if she struggles with weakness or decides that she would like for therapy to return, she can f/u w/ PCP on that need.

## 2022-04-06 DIAGNOSIS — M19.90 OSTEOARTHRITIS, UNSPECIFIED OSTEOARTHRITIS TYPE, UNSPECIFIED SITE: ICD-10-CM

## 2022-04-06 DIAGNOSIS — M25.50 GENERALIZED JOINT PAIN: ICD-10-CM

## 2022-04-06 NOTE — TELEPHONE ENCOUNTER
Caller: Thelma Michael    Relationship: Self    Best call back number: [phone]    Requested Prescriptions:   Requested Prescriptions     Pending Prescriptions Disp Refills   • traMADol (ULTRAM) 50 MG tablet 28 tablet 2     Sig: Take 1 tablet by mouth Every 6 (Six) Hours As Needed for Moderate Pain .        Pharmacy where request should be sent: [facility] [address] [place] [address] [phone] [facility] [phone] FX     Additional details provided by patient: 5 PILLS REMAINING     Does the patient have less than a 3 day supply:  [x] Yes  [] No    Nicol Stone Rep   04/06/22 15:50 EDT

## 2022-04-07 ENCOUNTER — READMISSION MANAGEMENT (OUTPATIENT)
Dept: CALL CENTER | Facility: HOSPITAL | Age: 62
End: 2022-04-07

## 2022-04-07 ENCOUNTER — OFFICE VISIT (OUTPATIENT)
Dept: INTERNAL MEDICINE | Facility: CLINIC | Age: 62
End: 2022-04-07

## 2022-04-07 ENCOUNTER — LAB (OUTPATIENT)
Dept: LAB | Facility: HOSPITAL | Age: 62
End: 2022-04-07

## 2022-04-07 VITALS
WEIGHT: 140 LBS | BODY MASS INDEX: 21.22 KG/M2 | TEMPERATURE: 96.9 F | OXYGEN SATURATION: 98 % | HEIGHT: 68 IN | DIASTOLIC BLOOD PRESSURE: 70 MMHG | HEART RATE: 98 BPM | SYSTOLIC BLOOD PRESSURE: 124 MMHG

## 2022-04-07 DIAGNOSIS — K31.84 GASTROPARESIS: ICD-10-CM

## 2022-04-07 DIAGNOSIS — K31.84 GASTROPARESIS: Primary | ICD-10-CM

## 2022-04-07 LAB
BASOPHILS # BLD AUTO: 0.04 10*3/MM3 (ref 0–0.2)
BASOPHILS NFR BLD AUTO: 0.6 % (ref 0–1.5)
DEPRECATED RDW RBC AUTO: 46.5 FL (ref 37–54)
EOSINOPHIL # BLD AUTO: 0.15 10*3/MM3 (ref 0–0.4)
EOSINOPHIL NFR BLD AUTO: 2.3 % (ref 0.3–6.2)
ERYTHROCYTE [DISTWIDTH] IN BLOOD BY AUTOMATED COUNT: 13.6 % (ref 12.3–15.4)
HCT VFR BLD AUTO: 32.9 % (ref 34–46.6)
HGB BLD-MCNC: 10.7 G/DL (ref 12–15.9)
IMM GRANULOCYTES # BLD AUTO: 0.02 10*3/MM3 (ref 0–0.05)
IMM GRANULOCYTES NFR BLD AUTO: 0.3 % (ref 0–0.5)
LYMPHOCYTES # BLD AUTO: 1.87 10*3/MM3 (ref 0.7–3.1)
LYMPHOCYTES NFR BLD AUTO: 28.9 % (ref 19.6–45.3)
MCH RBC QN AUTO: 29.9 PG (ref 26.6–33)
MCHC RBC AUTO-ENTMCNC: 32.5 G/DL (ref 31.5–35.7)
MCV RBC AUTO: 91.9 FL (ref 79–97)
MONOCYTES # BLD AUTO: 0.41 10*3/MM3 (ref 0.1–0.9)
MONOCYTES NFR BLD AUTO: 6.3 % (ref 5–12)
NEUTROPHILS NFR BLD AUTO: 3.99 10*3/MM3 (ref 1.7–7)
NEUTROPHILS NFR BLD AUTO: 61.6 % (ref 42.7–76)
NRBC BLD AUTO-RTO: 0 /100 WBC (ref 0–0.2)
PLATELET # BLD AUTO: 364 10*3/MM3 (ref 140–450)
PMV BLD AUTO: 10.7 FL (ref 6–12)
RBC # BLD AUTO: 3.58 10*6/MM3 (ref 3.77–5.28)
WBC NRBC COR # BLD: 6.48 10*3/MM3 (ref 3.4–10.8)

## 2022-04-07 PROCEDURE — 80053 COMPREHEN METABOLIC PANEL: CPT | Performed by: PHYSICIAN ASSISTANT

## 2022-04-07 PROCEDURE — 36415 COLL VENOUS BLD VENIPUNCTURE: CPT

## 2022-04-07 PROCEDURE — 85025 COMPLETE CBC W/AUTO DIFF WBC: CPT

## 2022-04-07 PROCEDURE — 99213 OFFICE O/P EST LOW 20 MIN: CPT | Performed by: PHYSICIAN ASSISTANT

## 2022-04-07 RX ORDER — TRAMADOL HYDROCHLORIDE 50 MG/1
50 TABLET ORAL EVERY 6 HOURS PRN
Qty: 28 TABLET | Refills: 2 | Status: SHIPPED | OUTPATIENT
Start: 2022-04-07 | End: 2022-04-14 | Stop reason: SDUPTHER

## 2022-04-07 NOTE — OUTREACH NOTE
Medical Week 2 Survey    Flowsheet Row Responses   [facility] patient discharged from? [facility]   Does the patient have one of the following disease processes/diagnoses(primary or secondary)? Other   Week 2 attempt successful? No   Unsuccessful attempts Attempt 1          AYDE MICHELLE - Registered Nurse

## 2022-04-07 NOTE — PROGRESS NOTES
Transitional Care Follow Up Visit  Subjective     Thelma Michael is a 61 y.o. female who presents for a transitional care management visit.    Within 48 business hours after discharge our office contacted her via telephone to coordinate her care and needs.      I reviewed and discussed the details of that call along with the discharge summary, hospital problems, inpatient lab results, inpatient diagnostic studies, and consultation reports with Thelma.     Current outpatient and discharge medications have been reconciled for the patient.  Reviewed by: Fazal Lucas PA-C      Date of TCM Phone Call 3/29/2022   [facility]   Date of Admission 3/26/2022   Date of Discharge 3/29/2022   [place] [facility]     Risk for Readmission (LACE) Score: 12 (3/29/2022  6:01 AM)      Patient is a 61-year-old female in today for hospital discharge follow-up for diabetic gastroparesis.  Hospital course was as follows: Thelma Michael is a 61 y.o. female with PMH significant for type 1 diabetes, diabetic gastroparesis, dyslipidemia, GERD, essential hypertension, depression, anxiety, tobacco abuse, migraines, who presents the ED with complaints of recurrent nausea, vomiting, diarrhea, and abdominal pain.      This patient's problems and plans were partially entered by my partner and updated as appropriate by me 03/28/22.        [facility]  Cdiff  Colitis 2/2 above  Gastroparesis  N/v/D and abdominal pain  -Known gastroparesis, will need to follow-up with motility clinic outpatient, patient to be called with appointment time  -GI follows, appreciate their assistance, d/w them on day of discharge and cleared patient for discharge  --oral vanc started for cdiff, continue, continue for total of 14 days given frail state   --supportive care for Ecoli infection, monitor  -adv diet as tolerated- GI soft today-- keep at this today per GI recs     Hypertensive urgency  Essential  "hypertension  -Cardene gtt on presentation, now off   -restart amlodipine, Cardura w/ hold parameters; holding HCTX for now, this was not resumed at discharge and would recommend holding until PCP f/u given ongoing cdiff     Diabetes mellitus 1  Metabolic acidosis   -Has insulin pump that is currently turned off  -BS improved, suspect AGMA 2/2 starvation ketosis 2/2 above  -ac/hs fingersticks/monitor  -improved at discharge, ok to resume insulin pump at discharge     Hypokalemia  Hypomagnesemia  -Electrolyte replacement  -Continue to monitor     Thrombocytosis  -Continue to monitor     Hyperlipidemia  -continue statin\".  All medications reconciled with patient in office today.  Patient reports taking all medicine given to her upon hospital discharge as directed without any problems or side effects.  Still on vancomycin.  Reports overall feeling much better now.        The following portions of the patient's history were reviewed and updated as appropriate: allergies, current medications, past family history, past medical history, past social history, past surgical history and problem list.    Review of Systems   Constitutional: Negative for activity change, chills, fatigue, fever and unexpected weight change.   HENT: Negative for congestion, ear pain, postnasal drip, sinus pressure and sore throat.    Eyes: Negative for pain, discharge and redness.   Respiratory: Negative for cough, shortness of breath and wheezing.    Cardiovascular: Negative for chest pain, palpitations and leg swelling.   Gastrointestinal: Negative for diarrhea, nausea and vomiting.   Endocrine: Negative for cold intolerance and heat intolerance.   Genitourinary: Negative for decreased urine volume and dysuria.   Musculoskeletal: Negative for arthralgias and myalgias.   Skin: Negative for rash and wound.   Neurological: Negative for dizziness, light-headedness and headaches.   Hematological: Does not bruise/bleed easily.   Psychiatric/Behavioral: " Negative for confusion, dysphoric mood and sleep disturbance. The patient is not nervous/anxious.        Objective   Physical Exam  Vitals and nursing note reviewed.   Constitutional:       General: She is not in acute distress.     Appearance: She is not ill-appearing.   HENT:      Head: Normocephalic.      Right Ear: Tympanic membrane, ear canal and external ear normal. There is no impacted cerumen.      Left Ear: Tympanic membrane, ear canal and external ear normal. There is no impacted cerumen.      Nose: No congestion or rhinorrhea.      Mouth/Throat:      Mouth: Mucous membranes are moist.      Pharynx: Oropharynx is clear. No oropharyngeal exudate or posterior oropharyngeal erythema.   Eyes:      General:         Right eye: No discharge.         Left eye: No discharge.      Extraocular Movements: Extraocular movements intact.      Conjunctiva/sclera: Conjunctivae normal.      Pupils: Pupils are equal, round, and reactive to light.   Cardiovascular:      Rate and Rhythm: Normal rate and regular rhythm.      Heart sounds: Normal heart sounds. No murmur heard.    No friction rub. No gallop.   Pulmonary:      Effort: Pulmonary effort is normal. No respiratory distress.      Breath sounds: Normal breath sounds. No wheezing.   Abdominal:      General: Bowel sounds are normal. There is no distension.      Palpations: Abdomen is soft. There is no mass.      Tenderness: There is no abdominal tenderness.   Musculoskeletal:         General: No swelling. Normal range of motion.      Cervical back: Normal range of motion. No tenderness.      Right lower leg: No edema.      Left lower leg: No edema.   Lymphadenopathy:      Cervical: No cervical adenopathy.   Skin:     Findings: No bruising, erythema or rash.   Neurological:      Mental Status: She is oriented to person, place, and time.      Gait: Gait normal.   Psychiatric:         Mood and Affect: Mood normal.         Behavior: Behavior normal.         Thought Content:  Thought content normal.         Judgment: Judgment normal.         Assessment/Plan     Diagnoses and all orders for this visit:    1. Gastroparesis (Primary)- overall doing better.  Advised if symptoms/condition does not continue to improve or worsens to go back to the ER immediately. Patient verbalized understanding of all instructions given and complied. Repeat cbc and cmp.

## 2022-04-08 DIAGNOSIS — M25.50 GENERALIZED JOINT PAIN: ICD-10-CM

## 2022-04-08 DIAGNOSIS — M19.90 OSTEOARTHRITIS, UNSPECIFIED OSTEOARTHRITIS TYPE, UNSPECIFIED SITE: ICD-10-CM

## 2022-04-08 LAB
ALBUMIN SERPL-MCNC: 4.3 G/DL (ref 3.5–5.2)
ALBUMIN/GLOB SERPL: 1.6 G/DL
ALP SERPL-CCNC: 97 U/L (ref 39–117)
ALT SERPL W P-5'-P-CCNC: 43 U/L (ref 1–33)
ANION GAP SERPL CALCULATED.3IONS-SCNC: 12.8 MMOL/L (ref 5–15)
AST SERPL-CCNC: 36 U/L (ref 1–32)
BILIRUB SERPL-MCNC: <0.2 MG/DL (ref 0–1.2)
BUN SERPL-MCNC: 20 MG/DL (ref 8–23)
BUN/CREAT SERPL: 20.4 (ref 7–25)
CALCIUM SPEC-SCNC: 9.3 MG/DL (ref 8.6–10.5)
CHLORIDE SERPL-SCNC: 104 MMOL/L (ref 98–107)
CO2 SERPL-SCNC: 24.2 MMOL/L (ref 22–29)
CREAT SERPL-MCNC: 0.98 MG/DL (ref 0.57–1)
EGFRCR SERPLBLD CKD-EPI 2021: 65 ML/MIN/1.73
GLOBULIN UR ELPH-MCNC: 2.7 GM/DL
GLUCOSE SERPL-MCNC: 120 MG/DL (ref 65–99)
POTASSIUM SERPL-SCNC: 4.6 MMOL/L (ref 3.5–5.2)
PROT SERPL-MCNC: 7 G/DL (ref 6–8.5)
SODIUM SERPL-SCNC: 141 MMOL/L (ref 136–145)

## 2022-04-08 RX ORDER — TRAMADOL HYDROCHLORIDE 50 MG/1
50 TABLET ORAL EVERY 6 HOURS PRN
Qty: 28 TABLET | Refills: 2 | Status: CANCELLED | OUTPATIENT
Start: 2022-04-08

## 2022-04-08 NOTE — TELEPHONE ENCOUNTER
Caller: Thelma Michael    Relationship: Self    Best call back number: [phone]    Requested Prescriptions:   Requested Prescriptions     Pending Prescriptions Disp Refills   • traMADol (ULTRAM) 50 MG tablet 28 tablet 2     Sig: Take 1 tablet by mouth Every 6 (Six) Hours As Needed for Moderate Pain .        Pharmacy where request should be sent: [facility] [address] [place] [address] [phone] [facility] [phone] FX     Additional details provided by patient: PATIENT IS DOWN TO ONE TABLET      Does the patient have less than a 3 day supply:  [x] Yes  [] No    Nicol BARAKAT Rep   04/08/22 13:29 EDT

## 2022-04-12 ENCOUNTER — READMISSION MANAGEMENT (OUTPATIENT)
Dept: CALL CENTER | Facility: HOSPITAL | Age: 62
End: 2022-04-12

## 2022-04-12 ENCOUNTER — TELEPHONE (OUTPATIENT)
Dept: INTERNAL MEDICINE | Facility: CLINIC | Age: 62
End: 2022-04-12

## 2022-04-12 NOTE — OUTREACH NOTE
Medical Week 2 Survey    Flowsheet Row Responses   [facility] patient discharged from? [place]   Does the patient have one of the following disease processes/diagnoses(primary or secondary)? Other   Week 2 attempt successful? No   Unsuccessful attempts Attempt 2          JOAN CASTELAN - Registered Nurse

## 2022-04-14 ENCOUNTER — OFFICE VISIT (OUTPATIENT)
Dept: INTERNAL MEDICINE | Facility: CLINIC | Age: 62
End: 2022-04-14

## 2022-04-14 VITALS
HEART RATE: 64 BPM | HEIGHT: 68 IN | DIASTOLIC BLOOD PRESSURE: 80 MMHG | OXYGEN SATURATION: 99 % | WEIGHT: 126 LBS | TEMPERATURE: 98.2 F | SYSTOLIC BLOOD PRESSURE: 126 MMHG | BODY MASS INDEX: 19.1 KG/M2 | RESPIRATION RATE: 16 BRPM

## 2022-04-14 DIAGNOSIS — I10 ESSENTIAL HYPERTENSION: ICD-10-CM

## 2022-04-14 DIAGNOSIS — M19.90 OSTEOARTHRITIS, UNSPECIFIED OSTEOARTHRITIS TYPE, UNSPECIFIED SITE: ICD-10-CM

## 2022-04-14 DIAGNOSIS — E10.65 UNCONTROLLED TYPE 1 DIABETES MELLITUS WITH HYPERGLYCEMIA: Primary | ICD-10-CM

## 2022-04-14 PROCEDURE — 99214 OFFICE O/P EST MOD 30 MIN: CPT | Performed by: NURSE PRACTITIONER

## 2022-04-14 RX ORDER — TRAMADOL HYDROCHLORIDE 50 MG/1
50 TABLET ORAL EVERY 6 HOURS PRN
Qty: 28 TABLET | Refills: 2 | Status: SHIPPED | OUTPATIENT
Start: 2022-04-14 | End: 2023-01-05 | Stop reason: SDUPTHER

## 2022-04-14 RX ORDER — HYDROCHLOROTHIAZIDE 12.5 MG/1
12.5 TABLET ORAL DAILY
COMMUNITY
Start: 2022-04-06 | End: 2022-07-04 | Stop reason: HOSPADM

## 2022-04-18 ENCOUNTER — TELEPHONE (OUTPATIENT)
Dept: ENDOCRINOLOGY | Facility: CLINIC | Age: 62
End: 2022-04-18

## 2022-04-21 ENCOUNTER — READMISSION MANAGEMENT (OUTPATIENT)
Dept: CALL CENTER | Facility: HOSPITAL | Age: 62
End: 2022-04-21

## 2022-04-21 NOTE — OUTREACH NOTE
Medical Week 3 Survey    Flowsheet Row Responses   [facility] patient discharged from? [place]   Does the patient have one of the following disease processes/diagnoses(primary or secondary)? Other   Week 3 attempt successful? No   Unsuccessful attempts Attempt 1   Discharge diagnosis Periumbilical abdominal pain Intractable nausea and vomiting           ANEL T - Registered Nurse

## 2022-05-31 NOTE — PROGRESS NOTES
Subjective   Chief Complaint   Patient presents with   • Diabetes      Thelma Michael is a 61 y.o. female.     The patient is here today for diabetes, hypertension, and joint pain. Patient has insulin pump and seeing endocrinology. Blood sugar is better controlled than before but still unstable. A1C last month was 8.4. Patient frequently has hypoglycemic episodes and hyperglycemia with ketoacidosis. Most recent hospitalization for this was on 3/12. She is trying to eat a more balanced diet. BP is stable and at goal. No shortness of breath or chest pain. Continues to experience generalized joint pain and back pain related to osteoarthritis. Cannot tolerate nsaids due to gastroparesis, nausea, and vomiting. Tylenol does not help with pain. Tramadol gives her some relief.      I have reviewed the following portions of the patient's history and confirmed they are accurate: allergies, current medications, past family history, past medical history, past social history, past surgical history, and problem list    I have personally completed the patient's review of systems.    Review of Systems   Constitutional: Positive for fatigue. Negative for activity change, appetite change, chills, diaphoresis, fever, unexpected weight gain and unexpected weight loss.   HENT: Negative for ear discharge, ear pain, mouth sores, nosebleeds, sinus pressure, sneezing and sore throat.    Eyes: Negative for pain, discharge and itching.   Respiratory: Negative for cough, chest tightness, shortness of breath and wheezing.    Cardiovascular: Negative for chest pain and palpitations.   Gastrointestinal: Negative for abdominal pain, diarrhea, nausea, vomiting, GERD and indigestion.   Endocrine: Negative for heat intolerance, polydipsia and polyphagia.   Genitourinary: Negative for dysuria, flank pain, frequency, hematuria, pelvic pain, pelvic pressure and urgency.   Musculoskeletal: Positive for arthralgias, back pain and myalgias. Negative  for gait problem, joint swelling, neck pain and neck stiffness.   Skin: Negative for color change, pallor and rash.   Allergic/Immunologic: Negative for immunocompromised state.   Neurological: Positive for numbness and memory problem. Negative for dizziness, seizures, speech difficulty, light-headedness, headache and confusion.   Hematological: Negative for adenopathy. Bruises/bleeds easily.   Psychiatric/Behavioral: Positive for stress. Negative for agitation, decreased concentration, dysphoric mood, sleep disturbance and depressed mood. The patient is nervous/anxious.        Current Outpatient Medications on File Prior to Visit   Medication Sig   • acetaminophen (TYLENOL) 325 MG tablet Take 2 tablets by mouth Every 4 (Four) Hours As Needed for Mild Pain .   • albuterol sulfate  (90 Base) MCG/ACT inhaler Inhale 2 puffs Every 4 (Four) Hours As Needed for Wheezing.   • amLODIPine (NORVASC) 10 MG tablet Take 1 tablet by mouth Daily.   • Ascorbic Acid (VITAMIN C PO) Vitamin C TABS   • aspirin 81 MG EC tablet Take 1 tablet by mouth Daily.   • atorvastatin (LIPITOR) 80 MG tablet Take 1 tablet by mouth Every Night.   • Blood Glucose Monitoring Suppl (FreeStyle Lite) w/Device kit USE UNIT TO CHECK GLUCOSE 4 TIMES DAILY   • calcium carbonate (TUMS) 500 MG chewable tablet Chew 1,000 mg 3 (Three) Times a Day As Needed for Indigestion or Heartburn.   • Continuous Blood Gluc  (Dexcom G6 ) device 1 kit Every 3 (Three) Months.   • Continuous Blood Gluc Sensor (Dexcom G6 Sensor) Every 10 (Ten) Days.   • Continuous Blood Gluc Transmit (Dexcom G6 Transmitter) misc 1 kit Every 3 (Three) Months.   • donepezil (Aricept) 5 MG tablet Take 1 tablet by mouth Every Night.   • doxazosin (CARDURA) 1 MG tablet Take 1 tablet by mouth Every Night.   • estradiol (ESTRACE VAGINAL) 0.1 MG/GM vaginal cream Insert 1 gm intravaginally twice weekly at bedtime.   • ferrous sulfate 325 (65 FE) MG tablet Take 1 tablet by mouth  "Daily With Breakfast. Take with orange juice or vitamin C   • FLUoxetine (PROzac) 20 MG capsule Take 1 capsule by mouth Daily.   • gabapentin (NEURONTIN) 400 MG capsule Take 1 capsule by mouth 2 (Two) Times a Day As Needed (leg and foot pain).   • Gimoti 15 MG/ACT solution    • glucose blood (Glucose Meter Test) test strip Use as instructed to check blood glucose levels 3 times daily   • glucose blood test strip Use as instructed   • glucose monitor monitoring kit 1 each 4 (Four) Times a Day.   • hydroCHLOROthiazide (HYDRODIURIL) 12.5 MG tablet Take 12.5 mg by mouth Daily.   • Insulin Pen Needle (BD Pen Needle Cydney U/F) 32G X 4 MM misc Take 1 each by mouth 4 (Four) Times a Day.   • melatonin 5 MG tablet tablet Take 1 tablet by mouth At Night As Needed (sleep).   • Multiple Vitamins-Minerals (Multivitamin-Minerals) tablet Take 1 tablet by mouth Daily.   • pantoprazole (PROTONIX) 40 MG EC tablet Take 1 tablet by mouth Daily.   • sennosides-docusate (senna-docusate sodium) 8.6-50 MG per tablet Take 2 tablets by mouth 2 (Two) Times a Day As Needed for Constipation.   • traZODone (DESYREL) 50 MG tablet TAKE 1-2 TABLETS ONE HOUR BEFORE BEDTIME AS NEEDED FOR SLEEP.   • vitamin D (ERGOCALCIFEROL) 1.25 MG (17493 UT) capsule capsule Take 1 capsule by mouth once a week   • Wheat Dextrin (Benefiber Drink Mix) pack Take 1 Scoop by mouth Daily.   • [DISCONTINUED] Insulin Glargine (BASAGLAR KWIKPEN) 100 UNIT/ML injection pen Inject 10 Units under the skin into the appropriate area as directed Every Night.     No current facility-administered medications on file prior to visit.       Objective   Vitals:    04/14/22 1304   BP: 126/80   Pulse: 64   Resp: 16   Temp: 98.2 °F (36.8 °C)   SpO2: 99%   Weight: 57.2 kg (126 lb)   Height: 172.7 cm (67.99\")     Body mass index is 19.16 kg/m².    Physical Exam  Vitals reviewed.   Constitutional:       Appearance: Normal appearance. She is well-developed.   HENT:      Head: Normocephalic and " atraumatic.      Nose: Nose normal.   Eyes:      General: Lids are normal.      Conjunctiva/sclera: Conjunctivae normal.      Pupils: Pupils are equal, round, and reactive to light.   Neck:      Thyroid: No thyromegaly.      Trachea: Trachea normal.   Cardiovascular:      Rate and Rhythm: Normal rate and regular rhythm.      Heart sounds: Normal heart sounds.   Pulmonary:      Effort: Pulmonary effort is normal. No respiratory distress.      Breath sounds: Normal breath sounds.   Musculoskeletal:         General: Tenderness present.      Lumbar back: Spasms and tenderness present.   Skin:     General: Skin is warm and dry.   Neurological:      Mental Status: She is alert and oriented to person, place, and time.      GCS: GCS eye subscore is 4. GCS verbal subscore is 5. GCS motor subscore is 6.   Psychiatric:         Attention and Perception: Attention normal.         Mood and Affect: Mood and affect normal.         Speech: Speech normal.         Behavior: Behavior normal. Behavior is cooperative.         Thought Content: Thought content normal.         Assessment & Plan   Problem List Items Addressed This Visit        Endocrine and Metabolic    Uncontrolled type 1 diabetes mellitus with hyperglycemia (HCC) - Primary    Overview     Chronic unstable. Continue insulin pump and follow ups with endocrinology. Spoke to patient about importance of healthy well balanced diet with protein, fiber, and complex carbs.             Other Visit Diagnoses     Essential hypertension      Chronic stable. Continue doxazosin, amlodipine, and HCTZ.       Relevant Medications    hydroCHLOROthiazide (HYDRODIURIL) 12.5 MG tablet    Osteoarthritis, unspecified osteoarthritis type, unspecified site      Chronic stable. Continue tramadol PRN      Relevant Medications    traMADol (ULTRAM) 50 MG tablet             Current Outpatient Medications:   •  acetaminophen (TYLENOL) 325 MG tablet, Take 2 tablets by mouth Every 4 (Four) Hours As Needed  for Mild Pain ., Disp: , Rfl:   •  albuterol sulfate  (90 Base) MCG/ACT inhaler, Inhale 2 puffs Every 4 (Four) Hours As Needed for Wheezing., Disp: 18 g, Rfl: 5  •  amLODIPine (NORVASC) 10 MG tablet, Take 1 tablet by mouth Daily., Disp: 90 tablet, Rfl: 1  •  Ascorbic Acid (VITAMIN C PO), Vitamin C TABS, Disp: , Rfl:   •  aspirin 81 MG EC tablet, Take 1 tablet by mouth Daily., Disp: , Rfl:   •  atorvastatin (LIPITOR) 80 MG tablet, Take 1 tablet by mouth Every Night., Disp: 90 tablet, Rfl: 1  •  Blood Glucose Monitoring Suppl (FreeStyle Lite) w/Device kit, USE UNIT TO CHECK GLUCOSE 4 TIMES DAILY, Disp: , Rfl:   •  calcium carbonate (TUMS) 500 MG chewable tablet, Chew 1,000 mg 3 (Three) Times a Day As Needed for Indigestion or Heartburn., Disp: , Rfl:   •  Continuous Blood Gluc  (Dexcom G6 ) device, 1 kit Every 3 (Three) Months., Disp: 1 each, Rfl: 0  •  Continuous Blood Gluc Sensor (Dexcom G6 Sensor), Every 10 (Ten) Days., Disp: 9 each, Rfl: 3  •  Continuous Blood Gluc Transmit (Dexcom G6 Transmitter) misc, 1 kit Every 3 (Three) Months., Disp: 1 each, Rfl: 3  •  donepezil (Aricept) 5 MG tablet, Take 1 tablet by mouth Every Night., Disp: 30 tablet, Rfl: 1  •  doxazosin (CARDURA) 1 MG tablet, Take 1 tablet by mouth Every Night., Disp: 90 tablet, Rfl: 1  •  estradiol (ESTRACE VAGINAL) 0.1 MG/GM vaginal cream, Insert 1 gm intravaginally twice weekly at bedtime., Disp: 42.5 g, Rfl: 4  •  ferrous sulfate 325 (65 FE) MG tablet, Take 1 tablet by mouth Daily With Breakfast. Take with orange juice or vitamin C, Disp: 30 tablet, Rfl: 2  •  FLUoxetine (PROzac) 20 MG capsule, Take 1 capsule by mouth Daily., Disp: 90 capsule, Rfl: 1  •  gabapentin (NEURONTIN) 400 MG capsule, Take 1 capsule by mouth 2 (Two) Times a Day As Needed (leg and foot pain)., Disp: 60 capsule, Rfl: 1  •  Gimoti 15 MG/ACT solution, , Disp: , Rfl:   •  glucose blood (Glucose Meter Test) test strip, Use as instructed to check blood  glucose levels 3 times daily, Disp: 100 each, Rfl: 5  •  glucose blood test strip, Use as instructed, Disp: 200 each, Rfl: 12  •  glucose monitor monitoring kit, 1 each 4 (Four) Times a Day., Disp: 1 each, Rfl: 0  •  hydroCHLOROthiazide (HYDRODIURIL) 12.5 MG tablet, Take 12.5 mg by mouth Daily., Disp: , Rfl:   •  Insulin Pen Needle (BD Pen Needle Cydney U/F) 32G X 4 MM misc, Take 1 each by mouth 4 (Four) Times a Day., Disp: 100 each, Rfl: 5  •  melatonin 5 MG tablet tablet, Take 1 tablet by mouth At Night As Needed (sleep)., Disp: , Rfl:   •  Multiple Vitamins-Minerals (Multivitamin-Minerals) tablet, Take 1 tablet by mouth Daily., Disp: , Rfl:   •  pantoprazole (PROTONIX) 40 MG EC tablet, Take 1 tablet by mouth Daily., Disp: 30 tablet, Rfl: 5  •  sennosides-docusate (senna-docusate sodium) 8.6-50 MG per tablet, Take 2 tablets by mouth 2 (Two) Times a Day As Needed for Constipation., Disp: 60 tablet, Rfl: 5  •  traMADol (ULTRAM) 50 MG tablet, Take 1 tablet by mouth Every 6 (Six) Hours As Needed for Moderate Pain ., Disp: 28 tablet, Rfl: 2  •  traZODone (DESYREL) 50 MG tablet, TAKE 1-2 TABLETS ONE HOUR BEFORE BEDTIME AS NEEDED FOR SLEEP., Disp: 45 tablet, Rfl: 0  •  vitamin D (ERGOCALCIFEROL) 1.25 MG (97227 UT) capsule capsule, Take 1 capsule by mouth once a week, Disp: 4 capsule, Rfl: 0  •  Wheat Dextrin (Benefiber Drink Mix) pack, Take 1 Scoop by mouth Daily., Disp: 30 each, Rfl: 5       Plan of care reviewed with the patient at the conclusion of today's visit.  Education was provided regarding diagnosis, management, and any prescribed or recommended OTC medications.  Patient verbalized understanding of and agreement with management plan.     Return in about 1 month (around 5/14/2022), or if symptoms worsen or fail to improve.

## 2022-06-29 ENCOUNTER — APPOINTMENT (OUTPATIENT)
Dept: GENERAL RADIOLOGY | Facility: HOSPITAL | Age: 62
End: 2022-06-29

## 2022-06-29 ENCOUNTER — APPOINTMENT (OUTPATIENT)
Dept: CT IMAGING | Facility: HOSPITAL | Age: 62
End: 2022-06-29

## 2022-06-29 ENCOUNTER — HOSPITAL ENCOUNTER (OUTPATIENT)
Facility: HOSPITAL | Age: 62
Setting detail: OBSERVATION
Discharge: HOME | End: 2022-07-04
Attending: EMERGENCY MEDICINE | Admitting: INTERNAL MEDICINE

## 2022-06-29 DIAGNOSIS — E86.0 ACUTE DEHYDRATION: ICD-10-CM

## 2022-06-29 DIAGNOSIS — E83.42 HYPOMAGNESEMIA: ICD-10-CM

## 2022-06-29 DIAGNOSIS — N17.9 ACUTE RENAL FAILURE, UNSPECIFIED ACUTE RENAL FAILURE TYPE: ICD-10-CM

## 2022-06-29 DIAGNOSIS — R79.89 ELEVATED SERUM CREATININE: ICD-10-CM

## 2022-06-29 DIAGNOSIS — E10.65 UNCONTROLLED TYPE 1 DIABETES MELLITUS WITH HYPERGLYCEMIA: ICD-10-CM

## 2022-06-29 DIAGNOSIS — R73.9 HYPERGLYCEMIA: ICD-10-CM

## 2022-06-29 DIAGNOSIS — E87.6 ACUTE HYPOKALEMIA: ICD-10-CM

## 2022-06-29 DIAGNOSIS — K31.84 GASTROPARESIS: Primary | ICD-10-CM

## 2022-06-29 PROBLEM — D72.829 LEUKOCYTOSIS: Status: RESOLVED | Noted: 2022-02-11 | Resolved: 2022-06-29

## 2022-06-29 PROBLEM — N39.0 ACUTE UTI: Status: RESOLVED | Noted: 2021-03-30 | Resolved: 2022-06-29

## 2022-06-29 LAB
ALBUMIN SERPL-MCNC: 4.4 G/DL (ref 3.5–5.2)
ALBUMIN/GLOB SERPL: 1.4 G/DL
ALP SERPL-CCNC: 98 U/L (ref 39–117)
ALT SERPL W P-5'-P-CCNC: 8 U/L (ref 1–33)
ANION GAP SERPL CALCULATED.3IONS-SCNC: 19 MMOL/L (ref 5–15)
ARTERIAL PATENCY WRIST A: ABNORMAL
AST SERPL-CCNC: 12 U/L (ref 1–32)
ATMOSPHERIC PRESS: ABNORMAL MM[HG]
BACTERIA UR QL AUTO: NORMAL /HPF
BASE EXCESS BLDA CALC-SCNC: 2.9 MMOL/L (ref 0–2)
BASOPHILS # BLD AUTO: 0.01 10*3/MM3 (ref 0–0.2)
BASOPHILS NFR BLD AUTO: 0.1 % (ref 0–1.5)
BDY SITE: ABNORMAL
BILIRUB SERPL-MCNC: 0.7 MG/DL (ref 0–1.2)
BILIRUB UR QL STRIP: NEGATIVE
BODY TEMPERATURE: 37 C
BUN SERPL-MCNC: 59 MG/DL (ref 8–23)
BUN/CREAT SERPL: 26 (ref 7–25)
CALCIUM SPEC-SCNC: 9.6 MG/DL (ref 8.6–10.5)
CHLORIDE SERPL-SCNC: 95 MMOL/L (ref 98–107)
CLARITY UR: CLEAR
CO2 BLDA-SCNC: 28.2 MMOL/L (ref 22–33)
CO2 SERPL-SCNC: 24 MMOL/L (ref 22–29)
COHGB MFR BLD: 1 % (ref 0–2)
COLOR UR: YELLOW
CREAT SERPL-MCNC: 2.27 MG/DL (ref 0.57–1)
D-LACTATE SERPL-SCNC: 1 MMOL/L (ref 0.5–2)
DEPRECATED RDW RBC AUTO: 39.2 FL (ref 37–54)
EGFRCR SERPLBLD CKD-EPI 2021: 23.7 ML/MIN/1.73
EOSINOPHIL # BLD AUTO: 0 10*3/MM3 (ref 0–0.4)
EOSINOPHIL NFR BLD AUTO: 0 % (ref 0.3–6.2)
EPAP: 0
ERYTHROCYTE [DISTWIDTH] IN BLOOD BY AUTOMATED COUNT: 12.8 % (ref 12.3–15.4)
FLUAV SUBTYP SPEC NAA+PROBE: NOT DETECTED
FLUBV RNA ISLT QL NAA+PROBE: NOT DETECTED
GLOBULIN UR ELPH-MCNC: 3.1 GM/DL
GLUCOSE BLDC GLUCOMTR-MCNC: 100 MG/DL (ref 70–130)
GLUCOSE BLDC GLUCOMTR-MCNC: 236 MG/DL (ref 70–130)
GLUCOSE SERPL-MCNC: 327 MG/DL (ref 65–99)
GLUCOSE UR STRIP-MCNC: ABNORMAL MG/DL
HCO3 BLDA-SCNC: 27 MMOL/L (ref 20–26)
HCT VFR BLD AUTO: 38.7 % (ref 34–46.6)
HCT VFR BLD CALC: 40.5 % (ref 38–51)
HGB BLD-MCNC: 13.8 G/DL (ref 12–15.9)
HGB BLDA-MCNC: 13.2 G/DL (ref 14–18)
HGB UR QL STRIP.AUTO: ABNORMAL
HOLD SPECIMEN: NORMAL
HYALINE CASTS UR QL AUTO: NORMAL /LPF
IMM GRANULOCYTES # BLD AUTO: 0.02 10*3/MM3 (ref 0–0.05)
IMM GRANULOCYTES NFR BLD AUTO: 0.3 % (ref 0–0.5)
INHALED O2 CONCENTRATION: 21 %
IPAP: 0
KETONES UR QL STRIP: ABNORMAL
LEUKOCYTE ESTERASE UR QL STRIP.AUTO: NEGATIVE
LIPASE SERPL-CCNC: 25 U/L (ref 13–60)
LYMPHOCYTES # BLD AUTO: 1.51 10*3/MM3 (ref 0.7–3.1)
LYMPHOCYTES NFR BLD AUTO: 19.6 % (ref 19.6–45.3)
MAGNESIUM SERPL-MCNC: 2 MG/DL (ref 1.6–2.4)
MCH RBC QN AUTO: 30.2 PG (ref 26.6–33)
MCHC RBC AUTO-ENTMCNC: 35.7 G/DL (ref 31.5–35.7)
MCV RBC AUTO: 84.7 FL (ref 79–97)
METHGB BLD QL: 0.3 % (ref 0–1.5)
MODALITY: ABNORMAL
MONOCYTES # BLD AUTO: 0.44 10*3/MM3 (ref 0.1–0.9)
MONOCYTES NFR BLD AUTO: 5.7 % (ref 5–12)
NEUTROPHILS NFR BLD AUTO: 5.74 10*3/MM3 (ref 1.7–7)
NEUTROPHILS NFR BLD AUTO: 74.3 % (ref 42.7–76)
NITRITE UR QL STRIP: NEGATIVE
NOTE: ABNORMAL
NRBC BLD AUTO-RTO: 0 /100 WBC (ref 0–0.2)
OXYHGB MFR BLDV: 95.7 % (ref 94–99)
PAW @ PEAK INSP FLOW SETTING VENT: 0 CMH2O
PCO2 BLDA: 38.9 MM HG (ref 35–45)
PCO2 TEMP ADJ BLD: 38.9 MM HG (ref 35–45)
PH BLDA: 7.45 PH UNITS (ref 7.35–7.45)
PH UR STRIP.AUTO: 5.5 [PH] (ref 5–8)
PH, TEMP CORRECTED: 7.45 PH UNITS
PLATELET # BLD AUTO: 383 10*3/MM3 (ref 140–450)
PMV BLD AUTO: 10.6 FL (ref 6–12)
PO2 BLDA: 83.4 MM HG (ref 83–108)
PO2 TEMP ADJ BLD: 83.4 MM HG (ref 83–108)
POTASSIUM SERPL-SCNC: 2.9 MMOL/L (ref 3.5–5.2)
PROCALCITONIN SERPL-MCNC: 0.14 NG/ML (ref 0–0.25)
PROT SERPL-MCNC: 7.5 G/DL (ref 6–8.5)
PROT UR QL STRIP: ABNORMAL
QT INTERVAL: 412 MS
QTC INTERVAL: 478 MS
RBC # BLD AUTO: 4.57 10*6/MM3 (ref 3.77–5.28)
RBC # UR STRIP: NORMAL /HPF
REF LAB TEST METHOD: NORMAL
SARS-COV-2 RNA PNL SPEC NAA+PROBE: NOT DETECTED
SODIUM SERPL-SCNC: 138 MMOL/L (ref 136–145)
SP GR UR STRIP: 1.02 (ref 1–1.03)
SQUAMOUS #/AREA URNS HPF: NORMAL /HPF
TOTAL RATE: 0 BREATHS/MINUTE
TROPONIN T SERPL-MCNC: 0.01 NG/ML (ref 0–0.03)
UROBILINOGEN UR QL STRIP: ABNORMAL
WBC # UR STRIP: NORMAL /HPF
WBC NRBC COR # BLD: 7.72 10*3/MM3 (ref 3.4–10.8)
WHOLE BLOOD HOLD COAG: NORMAL
WHOLE BLOOD HOLD SPECIMEN: NORMAL

## 2022-06-29 PROCEDURE — 93005 ELECTROCARDIOGRAM TRACING: CPT | Performed by: EMERGENCY MEDICINE

## 2022-06-29 PROCEDURE — 71045 X-RAY EXAM CHEST 1 VIEW: CPT

## 2022-06-29 PROCEDURE — 63710000001 INSULIN DETEMIR PER 5 UNITS: Performed by: INTERNAL MEDICINE

## 2022-06-29 PROCEDURE — 81001 URINALYSIS AUTO W/SCOPE: CPT | Performed by: EMERGENCY MEDICINE

## 2022-06-29 PROCEDURE — 96376 TX/PRO/DX INJ SAME DRUG ADON: CPT

## 2022-06-29 PROCEDURE — G0378 HOSPITAL OBSERVATION PER HR: HCPCS

## 2022-06-29 PROCEDURE — C9803 HOPD COVID-19 SPEC COLLECT: HCPCS | Performed by: INTERNAL MEDICINE

## 2022-06-29 PROCEDURE — 83605 ASSAY OF LACTIC ACID: CPT | Performed by: EMERGENCY MEDICINE

## 2022-06-29 PROCEDURE — 84145 PROCALCITONIN (PCT): CPT | Performed by: INTERNAL MEDICINE

## 2022-06-29 PROCEDURE — 80053 COMPREHEN METABOLIC PANEL: CPT | Performed by: EMERGENCY MEDICINE

## 2022-06-29 PROCEDURE — 83050 HGB METHEMOGLOBIN QUAN: CPT

## 2022-06-29 PROCEDURE — 85025 COMPLETE CBC W/AUTO DIFF WBC: CPT

## 2022-06-29 PROCEDURE — 83735 ASSAY OF MAGNESIUM: CPT | Performed by: EMERGENCY MEDICINE

## 2022-06-29 PROCEDURE — 63710000001 INSULIN LISPRO (HUMAN) PER 5 UNITS: Performed by: INTERNAL MEDICINE

## 2022-06-29 PROCEDURE — 36600 WITHDRAWAL OF ARTERIAL BLOOD: CPT

## 2022-06-29 PROCEDURE — 96375 TX/PRO/DX INJ NEW DRUG ADDON: CPT

## 2022-06-29 PROCEDURE — 96365 THER/PROPH/DIAG IV INF INIT: CPT

## 2022-06-29 PROCEDURE — 87636 SARSCOV2 & INF A&B AMP PRB: CPT | Performed by: EMERGENCY MEDICINE

## 2022-06-29 PROCEDURE — 82375 ASSAY CARBOXYHB QUANT: CPT

## 2022-06-29 PROCEDURE — 0 POTASSIUM CHLORIDE 10 MEQ/100ML SOLUTION: Performed by: EMERGENCY MEDICINE

## 2022-06-29 PROCEDURE — 82805 BLOOD GASES W/O2 SATURATION: CPT

## 2022-06-29 PROCEDURE — 99220 PR INITIAL OBSERVATION CARE/DAY 70 MINUTES: CPT | Performed by: INTERNAL MEDICINE

## 2022-06-29 PROCEDURE — 96372 THER/PROPH/DIAG INJ SC/IM: CPT

## 2022-06-29 PROCEDURE — 99284 EMERGENCY DEPT VISIT MOD MDM: CPT

## 2022-06-29 PROCEDURE — 25010000002 HEPARIN (PORCINE) PER 1000 UNITS: Performed by: INTERNAL MEDICINE

## 2022-06-29 PROCEDURE — 25010000002 ONDANSETRON PER 1 MG: Performed by: INTERNAL MEDICINE

## 2022-06-29 PROCEDURE — 83690 ASSAY OF LIPASE: CPT | Performed by: INTERNAL MEDICINE

## 2022-06-29 PROCEDURE — 74176 CT ABD & PELVIS W/O CONTRAST: CPT

## 2022-06-29 PROCEDURE — 84484 ASSAY OF TROPONIN QUANT: CPT | Performed by: EMERGENCY MEDICINE

## 2022-06-29 PROCEDURE — 25010000002 ONDANSETRON PER 1 MG: Performed by: EMERGENCY MEDICINE

## 2022-06-29 PROCEDURE — 36415 COLL VENOUS BLD VENIPUNCTURE: CPT

## 2022-06-29 PROCEDURE — 82962 GLUCOSE BLOOD TEST: CPT

## 2022-06-29 RX ORDER — POTASSIUM CHLORIDE 7.45 MG/ML
10 INJECTION INTRAVENOUS ONCE
Status: COMPLETED | OUTPATIENT
Start: 2022-06-29 | End: 2022-06-29

## 2022-06-29 RX ORDER — CALCIUM CARBONATE 200(500)MG
2 TABLET,CHEWABLE ORAL 3 TIMES DAILY PRN
Status: DISCONTINUED | OUTPATIENT
Start: 2022-06-29 | End: 2022-07-04 | Stop reason: HOSPADM

## 2022-06-29 RX ORDER — FERROUS SULFATE 325(65) MG
325 TABLET ORAL
Status: DISCONTINUED | OUTPATIENT
Start: 2022-06-30 | End: 2022-07-04 | Stop reason: HOSPADM

## 2022-06-29 RX ORDER — LABETALOL HYDROCHLORIDE 5 MG/ML
10 INJECTION, SOLUTION INTRAVENOUS ONCE
Status: COMPLETED | OUTPATIENT
Start: 2022-06-29 | End: 2022-06-29

## 2022-06-29 RX ORDER — TRAZODONE HYDROCHLORIDE 50 MG/1
50 TABLET ORAL NIGHTLY PRN
Status: DISCONTINUED | OUTPATIENT
Start: 2022-06-29 | End: 2022-07-04 | Stop reason: HOSPADM

## 2022-06-29 RX ORDER — SODIUM CHLORIDE 9 MG/ML
100 INJECTION, SOLUTION INTRAVENOUS CONTINUOUS
Status: DISCONTINUED | OUTPATIENT
Start: 2022-06-29 | End: 2022-06-30

## 2022-06-29 RX ORDER — ALUMINA, MAGNESIA, AND SIMETHICONE 2400; 2400; 240 MG/30ML; MG/30ML; MG/30ML
15 SUSPENSION ORAL EVERY 6 HOURS PRN
Status: DISCONTINUED | OUTPATIENT
Start: 2022-06-29 | End: 2022-07-04 | Stop reason: HOSPADM

## 2022-06-29 RX ORDER — ASPIRIN 81 MG/1
81 TABLET ORAL DAILY
Status: DISCONTINUED | OUTPATIENT
Start: 2022-06-29 | End: 2022-07-04 | Stop reason: HOSPADM

## 2022-06-29 RX ORDER — ACETAMINOPHEN 160 MG/5ML
650 SOLUTION ORAL EVERY 4 HOURS PRN
Status: DISCONTINUED | OUTPATIENT
Start: 2022-06-29 | End: 2022-07-04 | Stop reason: HOSPADM

## 2022-06-29 RX ORDER — DONEPEZIL HYDROCHLORIDE 5 MG/1
5 TABLET, FILM COATED ORAL NIGHTLY
Status: DISCONTINUED | OUTPATIENT
Start: 2022-06-29 | End: 2022-07-04 | Stop reason: HOSPADM

## 2022-06-29 RX ORDER — ALBUTEROL SULFATE 90 UG/1
2 AEROSOL, METERED RESPIRATORY (INHALATION) EVERY 4 HOURS PRN
Status: DISCONTINUED | OUTPATIENT
Start: 2022-06-29 | End: 2022-07-04 | Stop reason: HOSPADM

## 2022-06-29 RX ORDER — GABAPENTIN 400 MG/1
400 CAPSULE ORAL 2 TIMES DAILY PRN
Status: DISCONTINUED | OUTPATIENT
Start: 2022-06-29 | End: 2022-07-04 | Stop reason: HOSPADM

## 2022-06-29 RX ORDER — ONDANSETRON 4 MG/1
4 TABLET, FILM COATED ORAL EVERY 6 HOURS PRN
Status: DISCONTINUED | OUTPATIENT
Start: 2022-06-29 | End: 2022-07-04 | Stop reason: HOSPADM

## 2022-06-29 RX ORDER — POTASSIUM CHLORIDE 7.45 MG/ML
10 INJECTION INTRAVENOUS ONCE
Status: DISCONTINUED | OUTPATIENT
Start: 2022-06-29 | End: 2022-07-04 | Stop reason: HOSPADM

## 2022-06-29 RX ORDER — HEPARIN SODIUM 5000 [USP'U]/ML
5000 INJECTION, SOLUTION INTRAVENOUS; SUBCUTANEOUS EVERY 8 HOURS SCHEDULED
Status: DISCONTINUED | OUTPATIENT
Start: 2022-06-29 | End: 2022-07-04 | Stop reason: HOSPADM

## 2022-06-29 RX ORDER — ACETAMINOPHEN 650 MG/1
650 SUPPOSITORY RECTAL EVERY 4 HOURS PRN
Status: DISCONTINUED | OUTPATIENT
Start: 2022-06-29 | End: 2022-07-04 | Stop reason: HOSPADM

## 2022-06-29 RX ORDER — ONDANSETRON 2 MG/ML
4 INJECTION INTRAMUSCULAR; INTRAVENOUS ONCE
Status: COMPLETED | OUTPATIENT
Start: 2022-06-29 | End: 2022-06-29

## 2022-06-29 RX ORDER — DEXTROSE MONOHYDRATE 25 G/50ML
25 INJECTION, SOLUTION INTRAVENOUS
Status: DISCONTINUED | OUTPATIENT
Start: 2022-06-29 | End: 2022-07-04 | Stop reason: HOSPADM

## 2022-06-29 RX ORDER — MAGNESIUM SULFATE HEPTAHYDRATE 40 MG/ML
2 INJECTION, SOLUTION INTRAVENOUS AS NEEDED
Status: DISCONTINUED | OUTPATIENT
Start: 2022-06-29 | End: 2022-07-04 | Stop reason: HOSPADM

## 2022-06-29 RX ORDER — ATORVASTATIN CALCIUM 40 MG/1
80 TABLET, FILM COATED ORAL NIGHTLY
Status: DISCONTINUED | OUTPATIENT
Start: 2022-06-29 | End: 2022-07-04 | Stop reason: HOSPADM

## 2022-06-29 RX ORDER — POTASSIUM CHLORIDE 1.5 G/1.77G
40 POWDER, FOR SOLUTION ORAL AS NEEDED
Status: DISCONTINUED | OUTPATIENT
Start: 2022-06-29 | End: 2022-07-04 | Stop reason: HOSPADM

## 2022-06-29 RX ORDER — POTASSIUM CHLORIDE 7.45 MG/ML
10 INJECTION INTRAVENOUS
Status: DISCONTINUED | OUTPATIENT
Start: 2022-06-29 | End: 2022-07-04 | Stop reason: HOSPADM

## 2022-06-29 RX ORDER — FLUOXETINE HYDROCHLORIDE 20 MG/1
20 CAPSULE ORAL DAILY
Status: DISCONTINUED | OUTPATIENT
Start: 2022-06-29 | End: 2022-07-04 | Stop reason: HOSPADM

## 2022-06-29 RX ORDER — SODIUM CHLORIDE 0.9 % (FLUSH) 0.9 %
10 SYRINGE (ML) INJECTION AS NEEDED
Status: DISCONTINUED | OUTPATIENT
Start: 2022-06-29 | End: 2022-07-04 | Stop reason: HOSPADM

## 2022-06-29 RX ORDER — AMLODIPINE BESYLATE 10 MG/1
10 TABLET ORAL DAILY
Status: DISCONTINUED | OUTPATIENT
Start: 2022-06-29 | End: 2022-07-04 | Stop reason: HOSPADM

## 2022-06-29 RX ORDER — PANTOPRAZOLE SODIUM 40 MG/1
40 TABLET, DELAYED RELEASE ORAL DAILY
Status: DISCONTINUED | OUTPATIENT
Start: 2022-06-29 | End: 2022-07-04 | Stop reason: HOSPADM

## 2022-06-29 RX ORDER — SODIUM CHLORIDE 0.9 % (FLUSH) 0.9 %
1-10 SYRINGE (ML) INJECTION AS NEEDED
Status: DISCONTINUED | OUTPATIENT
Start: 2022-06-29 | End: 2022-07-04 | Stop reason: HOSPADM

## 2022-06-29 RX ORDER — INSULIN LISPRO 100 [IU]/ML
0-9 INJECTION, SOLUTION INTRAVENOUS; SUBCUTANEOUS
Status: DISCONTINUED | OUTPATIENT
Start: 2022-06-29 | End: 2022-07-02

## 2022-06-29 RX ORDER — ONDANSETRON 2 MG/ML
4 INJECTION INTRAMUSCULAR; INTRAVENOUS EVERY 6 HOURS PRN
Status: DISCONTINUED | OUTPATIENT
Start: 2022-06-29 | End: 2022-07-04 | Stop reason: HOSPADM

## 2022-06-29 RX ORDER — TERAZOSIN 1 MG/1
1 CAPSULE ORAL NIGHTLY
Status: DISCONTINUED | OUTPATIENT
Start: 2022-06-29 | End: 2022-07-04 | Stop reason: HOSPADM

## 2022-06-29 RX ORDER — INSULIN LISPRO 100 [IU]/ML
5 INJECTION, SOLUTION INTRAVENOUS; SUBCUTANEOUS
Status: DISCONTINUED | OUTPATIENT
Start: 2022-06-29 | End: 2022-07-02

## 2022-06-29 RX ORDER — SODIUM CHLORIDE 0.9 % (FLUSH) 0.9 %
10 SYRINGE (ML) INJECTION EVERY 12 HOURS SCHEDULED
Status: DISCONTINUED | OUTPATIENT
Start: 2022-06-29 | End: 2022-07-04 | Stop reason: HOSPADM

## 2022-06-29 RX ORDER — NICOTINE POLACRILEX 4 MG
15 LOZENGE BUCCAL
Status: DISCONTINUED | OUTPATIENT
Start: 2022-06-29 | End: 2022-07-04 | Stop reason: HOSPADM

## 2022-06-29 RX ORDER — ACETAMINOPHEN 325 MG/1
650 TABLET ORAL EVERY 4 HOURS PRN
Status: DISCONTINUED | OUTPATIENT
Start: 2022-06-29 | End: 2022-07-04 | Stop reason: HOSPADM

## 2022-06-29 RX ORDER — POTASSIUM CHLORIDE 750 MG/1
40 CAPSULE, EXTENDED RELEASE ORAL AS NEEDED
Status: DISCONTINUED | OUTPATIENT
Start: 2022-06-29 | End: 2022-07-04 | Stop reason: HOSPADM

## 2022-06-29 RX ORDER — TRAMADOL HYDROCHLORIDE 50 MG/1
50 TABLET ORAL EVERY 6 HOURS PRN
Status: DISCONTINUED | OUTPATIENT
Start: 2022-06-29 | End: 2022-07-04 | Stop reason: HOSPADM

## 2022-06-29 RX ORDER — CHOLECALCIFEROL (VITAMIN D3) 125 MCG
5 CAPSULE ORAL NIGHTLY PRN
Status: DISCONTINUED | OUTPATIENT
Start: 2022-06-29 | End: 2022-07-04 | Stop reason: HOSPADM

## 2022-06-29 RX ORDER — POTASSIUM CHLORIDE 7.45 MG/ML
10 INJECTION INTRAVENOUS ONCE
Status: COMPLETED | OUTPATIENT
Start: 2022-06-29 | End: 2022-06-30

## 2022-06-29 RX ORDER — MAGNESIUM SULFATE HEPTAHYDRATE 40 MG/ML
4 INJECTION, SOLUTION INTRAVENOUS AS NEEDED
Status: DISCONTINUED | OUTPATIENT
Start: 2022-06-29 | End: 2022-07-04 | Stop reason: HOSPADM

## 2022-06-29 RX ADMIN — DONEPEZIL HYDROCHLORIDE 5 MG: 5 TABLET ORAL at 20:50

## 2022-06-29 RX ADMIN — ASPIRIN 81 MG: 81 TABLET, COATED ORAL at 18:41

## 2022-06-29 RX ADMIN — SODIUM CHLORIDE 100 ML/HR: 9 INJECTION, SOLUTION INTRAVENOUS at 22:04

## 2022-06-29 RX ADMIN — INSULIN DETEMIR 15 UNITS: 100 INJECTION, SOLUTION SUBCUTANEOUS at 21:30

## 2022-06-29 RX ADMIN — LABETALOL HYDROCHLORIDE 10 MG: 5 INJECTION, SOLUTION INTRAVENOUS at 22:02

## 2022-06-29 RX ADMIN — POTASSIUM CHLORIDE 10 MEQ: 7.46 INJECTION, SOLUTION INTRAVENOUS at 22:05

## 2022-06-29 RX ADMIN — AMLODIPINE BESYLATE 10 MG: 10 TABLET ORAL at 18:41

## 2022-06-29 RX ADMIN — POTASSIUM CHLORIDE 10 MEQ: 7.46 INJECTION, SOLUTION INTRAVENOUS at 14:30

## 2022-06-29 RX ADMIN — TERAZOSIN HYDROCHLORIDE 1 MG: 1 CAPSULE ORAL at 20:50

## 2022-06-29 RX ADMIN — TRAZODONE HYDROCHLORIDE 50 MG: 50 TABLET ORAL at 20:50

## 2022-06-29 RX ADMIN — FLUOXETINE 20 MG: 20 CAPSULE ORAL at 18:41

## 2022-06-29 RX ADMIN — ONDANSETRON 4 MG: 2 INJECTION INTRAMUSCULAR; INTRAVENOUS at 23:30

## 2022-06-29 RX ADMIN — Medication 10 ML: at 22:13

## 2022-06-29 RX ADMIN — SODIUM CHLORIDE 1000 ML: 9 INJECTION, SOLUTION INTRAVENOUS at 14:29

## 2022-06-29 RX ADMIN — ONDANSETRON 4 MG: 2 INJECTION INTRAMUSCULAR; INTRAVENOUS at 14:29

## 2022-06-29 RX ADMIN — PANTOPRAZOLE SODIUM 40 MG: 40 TABLET, DELAYED RELEASE ORAL at 18:41

## 2022-06-29 RX ADMIN — POTASSIUM CHLORIDE 10 MEQ: 7.46 INJECTION, SOLUTION INTRAVENOUS at 23:25

## 2022-06-29 RX ADMIN — INSULIN LISPRO 5 UNITS: 100 INJECTION, SOLUTION INTRAVENOUS; SUBCUTANEOUS at 20:55

## 2022-06-29 RX ADMIN — ATORVASTATIN CALCIUM 80 MG: 40 TABLET, FILM COATED ORAL at 20:50

## 2022-06-29 RX ADMIN — HEPARIN SODIUM 5000 UNITS: 5000 INJECTION INTRAVENOUS; SUBCUTANEOUS at 20:50

## 2022-06-30 LAB
ANION GAP SERPL CALCULATED.3IONS-SCNC: 9 MMOL/L (ref 5–15)
BUN SERPL-MCNC: 55 MG/DL (ref 8–23)
BUN/CREAT SERPL: 27.5 (ref 7–25)
CALCIUM SPEC-SCNC: 8.6 MG/DL (ref 8.6–10.5)
CHLORIDE SERPL-SCNC: 104 MMOL/L (ref 98–107)
CHOLEST SERPL-MCNC: 218 MG/DL (ref 0–200)
CO2 SERPL-SCNC: 26 MMOL/L (ref 22–29)
CREAT SERPL-MCNC: 2 MG/DL (ref 0.57–1)
DEPRECATED RDW RBC AUTO: 39.7 FL (ref 37–54)
EGFRCR SERPLBLD CKD-EPI 2021: 27.6 ML/MIN/1.73
ERYTHROCYTE [DISTWIDTH] IN BLOOD BY AUTOMATED COUNT: 12.7 % (ref 12.3–15.4)
GLUCOSE BLDC GLUCOMTR-MCNC: 118 MG/DL (ref 70–130)
GLUCOSE BLDC GLUCOMTR-MCNC: 122 MG/DL (ref 70–130)
GLUCOSE BLDC GLUCOMTR-MCNC: 153 MG/DL (ref 70–130)
GLUCOSE BLDC GLUCOMTR-MCNC: 93 MG/DL (ref 70–130)
GLUCOSE SERPL-MCNC: 102 MG/DL (ref 65–99)
HBA1C MFR BLD: 6.7 % (ref 4.8–5.6)
HCT VFR BLD AUTO: 31.1 % (ref 34–46.6)
HDLC SERPL-MCNC: 51 MG/DL (ref 40–60)
HGB BLD-MCNC: 10.8 G/DL (ref 12–15.9)
LDLC SERPL CALC-MCNC: 146 MG/DL (ref 0–100)
LDLC/HDLC SERPL: 2.82 {RATIO}
MCH RBC QN AUTO: 29.8 PG (ref 26.6–33)
MCHC RBC AUTO-ENTMCNC: 34.7 G/DL (ref 31.5–35.7)
MCV RBC AUTO: 85.7 FL (ref 79–97)
PLATELET # BLD AUTO: 283 10*3/MM3 (ref 140–450)
PMV BLD AUTO: 10.6 FL (ref 6–12)
POTASSIUM SERPL-SCNC: 3.3 MMOL/L (ref 3.5–5.2)
QT INTERVAL: 398 MS
QTC INTERVAL: 462 MS
RBC # BLD AUTO: 3.63 10*6/MM3 (ref 3.77–5.28)
SODIUM SERPL-SCNC: 139 MMOL/L (ref 136–145)
TRIGL SERPL-MCNC: 115 MG/DL (ref 0–150)
TSH SERPL DL<=0.05 MIU/L-ACNC: 0.36 UIU/ML (ref 0.27–4.2)
VLDLC SERPL-MCNC: 21 MG/DL (ref 5–40)
WBC NRBC COR # BLD: 7.57 10*3/MM3 (ref 3.4–10.8)

## 2022-06-30 PROCEDURE — 63710000001 INSULIN LISPRO (HUMAN) PER 5 UNITS: Performed by: INTERNAL MEDICINE

## 2022-06-30 PROCEDURE — 25010000002 METOCLOPRAMIDE PER 10 MG: Performed by: NURSE PRACTITIONER

## 2022-06-30 PROCEDURE — 84132 ASSAY OF SERUM POTASSIUM: CPT | Performed by: INTERNAL MEDICINE

## 2022-06-30 PROCEDURE — 84443 ASSAY THYROID STIM HORMONE: CPT | Performed by: INTERNAL MEDICINE

## 2022-06-30 PROCEDURE — 80048 BASIC METABOLIC PNL TOTAL CA: CPT | Performed by: INTERNAL MEDICINE

## 2022-06-30 PROCEDURE — 99225 PR SBSQ OBSERVATION CARE/DAY 25 MINUTES: CPT | Performed by: INTERNAL MEDICINE

## 2022-06-30 PROCEDURE — G0378 HOSPITAL OBSERVATION PER HR: HCPCS

## 2022-06-30 PROCEDURE — 96375 TX/PRO/DX INJ NEW DRUG ADDON: CPT

## 2022-06-30 PROCEDURE — 82962 GLUCOSE BLOOD TEST: CPT

## 2022-06-30 PROCEDURE — 63710000001 INSULIN DETEMIR PER 5 UNITS: Performed by: INTERNAL MEDICINE

## 2022-06-30 PROCEDURE — 25010000002 ONDANSETRON PER 1 MG: Performed by: INTERNAL MEDICINE

## 2022-06-30 PROCEDURE — 96361 HYDRATE IV INFUSION ADD-ON: CPT

## 2022-06-30 PROCEDURE — 25010000002 HEPARIN (PORCINE) PER 1000 UNITS: Performed by: INTERNAL MEDICINE

## 2022-06-30 PROCEDURE — 85027 COMPLETE CBC AUTOMATED: CPT | Performed by: INTERNAL MEDICINE

## 2022-06-30 PROCEDURE — 93010 ELECTROCARDIOGRAM REPORT: CPT | Performed by: INTERNAL MEDICINE

## 2022-06-30 PROCEDURE — 99214 OFFICE O/P EST MOD 30 MIN: CPT | Performed by: NURSE PRACTITIONER

## 2022-06-30 PROCEDURE — 96366 THER/PROPH/DIAG IV INF ADDON: CPT

## 2022-06-30 PROCEDURE — 0 POTASSIUM CHLORIDE 10 MEQ/100ML SOLUTION: Performed by: INTERNAL MEDICINE

## 2022-06-30 PROCEDURE — 83036 HEMOGLOBIN GLYCOSYLATED A1C: CPT | Performed by: INTERNAL MEDICINE

## 2022-06-30 PROCEDURE — 96376 TX/PRO/DX INJ SAME DRUG ADON: CPT

## 2022-06-30 PROCEDURE — 96372 THER/PROPH/DIAG INJ SC/IM: CPT

## 2022-06-30 PROCEDURE — 93005 ELECTROCARDIOGRAM TRACING: CPT | Performed by: INTERNAL MEDICINE

## 2022-06-30 PROCEDURE — 97165 OT EVAL LOW COMPLEX 30 MIN: CPT

## 2022-06-30 PROCEDURE — 25010000002 PROCHLORPERAZINE 10 MG/2ML SOLUTION: Performed by: INTERNAL MEDICINE

## 2022-06-30 PROCEDURE — 80061 LIPID PANEL: CPT | Performed by: INTERNAL MEDICINE

## 2022-06-30 RX ORDER — PROCHLORPERAZINE EDISYLATE 5 MG/ML
2.5 INJECTION INTRAMUSCULAR; INTRAVENOUS EVERY 6 HOURS PRN
Status: DISCONTINUED | OUTPATIENT
Start: 2022-06-30 | End: 2022-06-30

## 2022-06-30 RX ORDER — METOCLOPRAMIDE HYDROCHLORIDE 5 MG/ML
10 INJECTION INTRAMUSCULAR; INTRAVENOUS
Status: DISCONTINUED | OUTPATIENT
Start: 2022-06-30 | End: 2022-07-03 | Stop reason: CLARIF

## 2022-06-30 RX ORDER — SODIUM CHLORIDE 9 MG/ML
100 INJECTION, SOLUTION INTRAVENOUS CONTINUOUS
Status: ACTIVE | OUTPATIENT
Start: 2022-06-30 | End: 2022-07-01

## 2022-06-30 RX ADMIN — INSULIN LISPRO 2 UNITS: 100 INJECTION, SOLUTION INTRAVENOUS; SUBCUTANEOUS at 17:36

## 2022-06-30 RX ADMIN — ONDANSETRON 4 MG: 2 INJECTION INTRAMUSCULAR; INTRAVENOUS at 07:00

## 2022-06-30 RX ADMIN — INSULIN LISPRO 5 UNITS: 100 INJECTION, SOLUTION INTRAVENOUS; SUBCUTANEOUS at 09:09

## 2022-06-30 RX ADMIN — HEPARIN SODIUM 5000 UNITS: 5000 INJECTION INTRAVENOUS; SUBCUTANEOUS at 09:08

## 2022-06-30 RX ADMIN — POTASSIUM CHLORIDE 40 MEQ: 750 CAPSULE, EXTENDED RELEASE ORAL at 11:52

## 2022-06-30 RX ADMIN — INSULIN DETEMIR 15 UNITS: 100 INJECTION, SOLUTION SUBCUTANEOUS at 20:32

## 2022-06-30 RX ADMIN — TERAZOSIN HYDROCHLORIDE 1 MG: 1 CAPSULE ORAL at 20:30

## 2022-06-30 RX ADMIN — FERROUS SULFATE TAB 325 MG (65 MG ELEMENTAL FE) 325 MG: 325 (65 FE) TAB at 09:08

## 2022-06-30 RX ADMIN — HEPARIN SODIUM 5000 UNITS: 5000 INJECTION INTRAVENOUS; SUBCUTANEOUS at 15:34

## 2022-06-30 RX ADMIN — HEPARIN SODIUM 5000 UNITS: 5000 INJECTION INTRAVENOUS; SUBCUTANEOUS at 20:31

## 2022-06-30 RX ADMIN — POTASSIUM CHLORIDE 10 MEQ: 7.46 INJECTION, SOLUTION INTRAVENOUS at 18:44

## 2022-06-30 RX ADMIN — ASPIRIN 81 MG: 81 TABLET, COATED ORAL at 09:09

## 2022-06-30 RX ADMIN — POTASSIUM CHLORIDE 10 MEQ: 7.46 INJECTION, SOLUTION INTRAVENOUS at 15:33

## 2022-06-30 RX ADMIN — SODIUM CHLORIDE 100 ML/HR: 9 INJECTION, SOLUTION INTRAVENOUS at 20:30

## 2022-06-30 RX ADMIN — PANTOPRAZOLE SODIUM 40 MG: 40 TABLET, DELAYED RELEASE ORAL at 09:08

## 2022-06-30 RX ADMIN — METOCLOPRAMIDE 10 MG: 5 INJECTION, SOLUTION INTRAMUSCULAR; INTRAVENOUS at 20:31

## 2022-06-30 RX ADMIN — DONEPEZIL HYDROCHLORIDE 5 MG: 5 TABLET ORAL at 20:30

## 2022-06-30 RX ADMIN — ATORVASTATIN CALCIUM 80 MG: 40 TABLET, FILM COATED ORAL at 20:30

## 2022-06-30 RX ADMIN — ACETAMINOPHEN 650 MG: 325 TABLET ORAL at 02:38

## 2022-06-30 RX ADMIN — POTASSIUM CHLORIDE 10 MEQ: 7.46 INJECTION, SOLUTION INTRAVENOUS at 16:43

## 2022-06-30 RX ADMIN — AMLODIPINE BESYLATE 10 MG: 10 TABLET ORAL at 09:09

## 2022-06-30 RX ADMIN — PROCHLORPERAZINE EDISYLATE 2.5 MG: 5 INJECTION INTRAMUSCULAR; INTRAVENOUS at 16:43

## 2022-06-30 RX ADMIN — FLUOXETINE 20 MG: 20 CAPSULE ORAL at 09:11

## 2022-06-30 RX ADMIN — Medication 10 ML: at 09:12

## 2022-06-30 RX ADMIN — POTASSIUM CHLORIDE 10 MEQ: 7.46 INJECTION, SOLUTION INTRAVENOUS at 17:36

## 2022-06-30 RX ADMIN — TRAZODONE HYDROCHLORIDE 50 MG: 50 TABLET ORAL at 20:52

## 2022-06-30 RX ADMIN — ONDANSETRON 4 MG: 2 INJECTION INTRAMUSCULAR; INTRAVENOUS at 13:06

## 2022-06-30 RX ADMIN — POTASSIUM CHLORIDE 10 MEQ: 7.46 INJECTION, SOLUTION INTRAVENOUS at 02:21

## 2022-06-30 RX ADMIN — POTASSIUM CHLORIDE 10 MEQ: 7.46 INJECTION, SOLUTION INTRAVENOUS at 04:02

## 2022-07-01 LAB
ALBUMIN SERPL-MCNC: 3.2 G/DL (ref 3.5–5.2)
ANION GAP SERPL CALCULATED.3IONS-SCNC: 9 MMOL/L (ref 5–15)
BASOPHILS # BLD AUTO: 0 10*3/MM3 (ref 0–0.2)
BASOPHILS NFR BLD AUTO: 0 % (ref 0–1.5)
BUN SERPL-MCNC: 33 MG/DL (ref 8–23)
BUN/CREAT SERPL: 23.7 (ref 7–25)
CALCIUM SPEC-SCNC: 8.4 MG/DL (ref 8.6–10.5)
CHLORIDE SERPL-SCNC: 108 MMOL/L (ref 98–107)
CO2 SERPL-SCNC: 22 MMOL/L (ref 22–29)
CREAT SERPL-MCNC: 1.39 MG/DL (ref 0.57–1)
DEPRECATED RDW RBC AUTO: 41.3 FL (ref 37–54)
EGFRCR SERPLBLD CKD-EPI 2021: 42.7 ML/MIN/1.73
EOSINOPHIL # BLD AUTO: 0.04 10*3/MM3 (ref 0–0.4)
EOSINOPHIL NFR BLD AUTO: 0.7 % (ref 0.3–6.2)
ERYTHROCYTE [DISTWIDTH] IN BLOOD BY AUTOMATED COUNT: 13 % (ref 12.3–15.4)
GLUCOSE BLDC GLUCOMTR-MCNC: 127 MG/DL (ref 70–130)
GLUCOSE BLDC GLUCOMTR-MCNC: 151 MG/DL (ref 70–130)
GLUCOSE BLDC GLUCOMTR-MCNC: 159 MG/DL (ref 70–130)
GLUCOSE BLDC GLUCOMTR-MCNC: 170 MG/DL (ref 70–130)
GLUCOSE BLDC GLUCOMTR-MCNC: 50 MG/DL (ref 70–130)
GLUCOSE BLDC GLUCOMTR-MCNC: 56 MG/DL (ref 70–130)
GLUCOSE SERPL-MCNC: 44 MG/DL (ref 65–99)
HCT VFR BLD AUTO: 28.8 % (ref 34–46.6)
HGB BLD-MCNC: 9.9 G/DL (ref 12–15.9)
IMM GRANULOCYTES # BLD AUTO: 0.02 10*3/MM3 (ref 0–0.05)
IMM GRANULOCYTES NFR BLD AUTO: 0.4 % (ref 0–0.5)
LYMPHOCYTES # BLD AUTO: 2.1 10*3/MM3 (ref 0.7–3.1)
LYMPHOCYTES NFR BLD AUTO: 39 % (ref 19.6–45.3)
MCH RBC QN AUTO: 30.1 PG (ref 26.6–33)
MCHC RBC AUTO-ENTMCNC: 34.4 G/DL (ref 31.5–35.7)
MCV RBC AUTO: 87.5 FL (ref 79–97)
MONOCYTES # BLD AUTO: 0.44 10*3/MM3 (ref 0.1–0.9)
MONOCYTES NFR BLD AUTO: 8.2 % (ref 5–12)
NEUTROPHILS NFR BLD AUTO: 2.78 10*3/MM3 (ref 1.7–7)
NEUTROPHILS NFR BLD AUTO: 51.7 % (ref 42.7–76)
NRBC BLD AUTO-RTO: 0 /100 WBC (ref 0–0.2)
PHOSPHATE SERPL-MCNC: 2.3 MG/DL (ref 2.5–4.5)
PLATELET # BLD AUTO: 244 10*3/MM3 (ref 140–450)
PMV BLD AUTO: 10.9 FL (ref 6–12)
POTASSIUM SERPL-SCNC: 3.4 MMOL/L (ref 3.5–5.2)
POTASSIUM SERPL-SCNC: 3.5 MMOL/L (ref 3.5–5.2)
POTASSIUM SERPL-SCNC: 4 MMOL/L (ref 3.5–5.2)
RBC # BLD AUTO: 3.29 10*6/MM3 (ref 3.77–5.28)
SODIUM SERPL-SCNC: 139 MMOL/L (ref 136–145)
WBC NRBC COR # BLD: 5.38 10*3/MM3 (ref 3.4–10.8)

## 2022-07-01 PROCEDURE — 97161 PT EVAL LOW COMPLEX 20 MIN: CPT

## 2022-07-01 PROCEDURE — G0378 HOSPITAL OBSERVATION PER HR: HCPCS

## 2022-07-01 PROCEDURE — 96361 HYDRATE IV INFUSION ADD-ON: CPT

## 2022-07-01 PROCEDURE — 84132 ASSAY OF SERUM POTASSIUM: CPT | Performed by: INTERNAL MEDICINE

## 2022-07-01 PROCEDURE — 96372 THER/PROPH/DIAG INJ SC/IM: CPT

## 2022-07-01 PROCEDURE — 97530 THERAPEUTIC ACTIVITIES: CPT

## 2022-07-01 PROCEDURE — 25010000002 METOCLOPRAMIDE PER 10 MG: Performed by: NURSE PRACTITIONER

## 2022-07-01 PROCEDURE — 25010000002 HEPARIN (PORCINE) PER 1000 UNITS: Performed by: INTERNAL MEDICINE

## 2022-07-01 PROCEDURE — 82962 GLUCOSE BLOOD TEST: CPT

## 2022-07-01 PROCEDURE — 96376 TX/PRO/DX INJ SAME DRUG ADON: CPT

## 2022-07-01 PROCEDURE — 0 POTASSIUM CHLORIDE 10 MEQ/100ML SOLUTION: Performed by: INTERNAL MEDICINE

## 2022-07-01 PROCEDURE — 99213 OFFICE O/P EST LOW 20 MIN: CPT | Performed by: PHYSICIAN ASSISTANT

## 2022-07-01 PROCEDURE — 99225 PR SBSQ OBSERVATION CARE/DAY 25 MINUTES: CPT | Performed by: INTERNAL MEDICINE

## 2022-07-01 PROCEDURE — 80069 RENAL FUNCTION PANEL: CPT | Performed by: INTERNAL MEDICINE

## 2022-07-01 PROCEDURE — 25010000002 ONDANSETRON PER 1 MG: Performed by: INTERNAL MEDICINE

## 2022-07-01 PROCEDURE — 96367 TX/PROPH/DG ADDL SEQ IV INF: CPT

## 2022-07-01 PROCEDURE — 96366 THER/PROPH/DIAG IV INF ADDON: CPT

## 2022-07-01 PROCEDURE — 85025 COMPLETE CBC W/AUTO DIFF WBC: CPT | Performed by: INTERNAL MEDICINE

## 2022-07-01 RX ORDER — FENTANYL/ROPIVACAINE/NS/PF 2-625MCG/1
15 PLASTIC BAG, INJECTION (ML) EPIDURAL AS NEEDED
Status: DISCONTINUED | OUTPATIENT
Start: 2022-07-01 | End: 2022-07-04 | Stop reason: HOSPADM

## 2022-07-01 RX ADMIN — SODIUM CHLORIDE 100 ML/HR: 9 INJECTION, SOLUTION INTRAVENOUS at 05:49

## 2022-07-01 RX ADMIN — POTASSIUM CHLORIDE 10 MEQ: 7.46 INJECTION, SOLUTION INTRAVENOUS at 05:49

## 2022-07-01 RX ADMIN — POTASSIUM CHLORIDE 10 MEQ: 7.46 INJECTION, SOLUTION INTRAVENOUS at 03:25

## 2022-07-01 RX ADMIN — PANTOPRAZOLE SODIUM 40 MG: 40 TABLET, DELAYED RELEASE ORAL at 09:33

## 2022-07-01 RX ADMIN — Medication 10 ML: at 20:14

## 2022-07-01 RX ADMIN — METOCLOPRAMIDE 10 MG: 5 INJECTION, SOLUTION INTRAMUSCULAR; INTRAVENOUS at 20:13

## 2022-07-01 RX ADMIN — ATORVASTATIN CALCIUM 80 MG: 40 TABLET, FILM COATED ORAL at 20:14

## 2022-07-01 RX ADMIN — METOCLOPRAMIDE 10 MG: 5 INJECTION, SOLUTION INTRAMUSCULAR; INTRAVENOUS at 17:20

## 2022-07-01 RX ADMIN — ASPIRIN 81 MG: 81 TABLET, COATED ORAL at 09:33

## 2022-07-01 RX ADMIN — Medication 5 MG: at 20:13

## 2022-07-01 RX ADMIN — ONDANSETRON 4 MG: 2 INJECTION INTRAMUSCULAR; INTRAVENOUS at 15:59

## 2022-07-01 RX ADMIN — POTASSIUM CHLORIDE 10 MEQ: 7.46 INJECTION, SOLUTION INTRAVENOUS at 04:35

## 2022-07-01 RX ADMIN — TERAZOSIN HYDROCHLORIDE 1 MG: 1 CAPSULE ORAL at 20:13

## 2022-07-01 RX ADMIN — POTASSIUM CHLORIDE 10 MEQ: 7.46 INJECTION, SOLUTION INTRAVENOUS at 02:21

## 2022-07-01 RX ADMIN — DONEPEZIL HYDROCHLORIDE 5 MG: 5 TABLET ORAL at 20:15

## 2022-07-01 RX ADMIN — HEPARIN SODIUM 5000 UNITS: 5000 INJECTION INTRAVENOUS; SUBCUTANEOUS at 14:27

## 2022-07-01 RX ADMIN — FERROUS SULFATE TAB 325 MG (65 MG ELEMENTAL FE) 325 MG: 325 (65 FE) TAB at 09:33

## 2022-07-01 RX ADMIN — HEPARIN SODIUM 5000 UNITS: 5000 INJECTION INTRAVENOUS; SUBCUTANEOUS at 04:35

## 2022-07-01 RX ADMIN — FLUOXETINE 20 MG: 20 CAPSULE ORAL at 09:33

## 2022-07-01 RX ADMIN — Medication 15 G: at 07:35

## 2022-07-01 RX ADMIN — Medication 5 MG: at 02:03

## 2022-07-01 RX ADMIN — POTASSIUM PHOSPHATE, MONOBASIC AND POTASSIUM PHOSPHATE, DIBASIC 9 MMOL: 224; 236 INJECTION, SOLUTION, CONCENTRATE INTRAVENOUS at 15:59

## 2022-07-01 RX ADMIN — HEPARIN SODIUM 5000 UNITS: 5000 INJECTION INTRAVENOUS; SUBCUTANEOUS at 20:15

## 2022-07-01 RX ADMIN — AMLODIPINE BESYLATE 10 MG: 10 TABLET ORAL at 09:33

## 2022-07-01 NOTE — CASE MANAGEMENT/SOCIAL WORK
Continued Stay Note  [facility]     Patient Name: Thelma Michael  MRN: [id number]  Today's Date: 7/1/2022    Admit Date: 6/29/2022     Discharge Plan     Row Name 07/01/22 1230       Plan    Plan Home    Patient/Family in Agreement with Plan yes    Plan Comments I spoke with Ms. Michael at the bedside. OT saw pt and recommends Home Health. I discussed this with Ms. Michael. She states she does not want Home Health. She states that she lives with her son and he helps her. She also states that when she is sick her daughter comes over to stay with her. Pt states she plans to D/C home today or tomorrow. Family will transport at D/C. No needs voiced or identified. CM will continue to follow.    Final Discharge Disposition Code 01 - home or self-care               Discharge Codes    No documentation.                     Julieta Bauman RN

## 2022-07-01 NOTE — PROGRESS NOTES
"GI Daily Progress Note  Subjective:    Chief Complaint:  Follow up gastroparesis     Feeling improved.   Tolerating creamy potato soup for lunch.  No emesis overnight.  Diarrhea has also resolved as she has not had a bowel movement since arrival.       Objective:    /86 (BP Location: Right arm, Patient Position: Lying)   Pulse 88   Temp 98.3 °F (36.8 °C) (Oral)   Resp 18   Ht 172.7 cm (68\")   Wt 54.4 kg (120 lb)   SpO2 100%   BMI 18.25 kg/m²     Physical Exam  Constitutional:       General: She is not in acute distress.     Comments: Thin underweight female   Very pleasant to speak with    Cardiovascular:      Rate and Rhythm: Normal rate and regular rhythm.   Pulmonary:      Effort: Pulmonary effort is normal. No respiratory distress.   Abdominal:      General: Bowel sounds are normal. There is no distension.      Palpations: Abdomen is soft.      Tenderness: There is no abdominal tenderness.   Neurological:      Mental Status: She is alert and oriented to person, place, and time.         Lab  Lab Results   Component Value Date    WBC 5.38 07/01/2022    HGB 9.9 (L) 07/01/2022    HGB 10.8 (L) 06/30/2022    HGB 13.8 06/29/2022    MCV 87.5 07/01/2022     07/01/2022    INR 0.95 05/20/2021    INR 1.10 10/31/2019    INR 0.9 10/31/2019    INR 1.01 08/05/2015    INR 1.04 08/17/2014       Lab Results   Component Value Date    GLUCOSE 44 (C) 07/01/2022    BUN 33 (H) 07/01/2022    CREATININE 1.39 (H) 07/01/2022    EGFRIFNONA 61 01/06/2015    EGFRIFAFRI 62 02/13/2022    BCR 23.7 07/01/2022     07/01/2022    K 3.5 07/01/2022    CO2 22.0 07/01/2022    CALCIUM 8.4 (L) 07/01/2022    PROTENTOTREF 7.6 01/06/2015    ALBUMIN 3.20 (L) 07/01/2022    ALKPHOS 98 06/29/2022    BILITOT 0.7 06/29/2022    ALT 8 06/29/2022    AST 12 06/29/2022       Assessment:    1. Gastroparesis  2. Intractable nausea and vomiting,  related to above  3. Diarrhea, resolved.     4. T1DM, better controlled than in prior interactions, " much improvement   5.  History of C. Difficile colitis     Plan:    Will discontinue C. Difficile toxin collection as her diarrhea has resolved.        She has an appointment [facility] [place] on Tuesday July 5th     >>> Continue full liquid diet.   Advance to GI soft tomorrow if continues to tolerate   >>> IV Reglan   >>> Once daily Protonix      JAMESON Sharma  07/01/22  12:26 EDT

## 2022-07-01 NOTE — CONSULTS
Diabetes Education    Patient Name:  Thelma Michael  YOB: 1960  MRN: [id number]  Admit Date:  6/29/2022    Diabetes ed follow-up this morning. Pt states she feels better today and is tolerating full liquids.    She states she had a low blood sugar overnight and had glucose gel. She states at home, this does not happen and her blood sugar ranges from . We talked about variations in blood sugar and how this is normal. Reinforcement of treatment of hypoglycemia using rule of 15's.     Pt does not demonstrate any further educational needs at this time. She was encouraged to reach out to us via her primary nurse if she has any questions during her hospital stay, and to contact our outpatient office if needs arise after discharge.     Thank you for this consult.    Electronically signed by:  Paula Hatch RN  07/01/22 11:28 EDT

## 2022-07-01 NOTE — PLAN OF CARE
Goal Outcome Evaluation:  Plan of Care Reviewed With: patient        Progress: no change  Outcome Evaluation: PT eval completed. Patient resting with eyes closed upon therapist arrival but easily becomes alert with conversation. Is alert and oriented x4. Presents with deficits in BLE strength, standing balance, gait quality, and functional endurance, effecting functional mobility below baseline. Requires Zeenat for bed mobility, CGA with HHA for transfers and ambulaiton x 25' in room. Patient will benefit from skilled IP PT services to address impairments for return to PLOF. Recommend home with assist and HHPT at [place].

## 2022-07-01 NOTE — THERAPY EVALUATION
Patient Name: Thelma Michael  : 1960    MRN: [id number]                              Today's Date: 2022       Admit Date: 2022    Visit Dx:     ICD-10-CM ICD-9-CM   1. Gastroparesis  K31.84 536.3   2. Acute dehydration  E86.0 276.51   3. Acute renal failure, unspecified acute renal failure type (HCC)  N17.9 584.9   4. Acute hypokalemia  E87.6 276.8   5. Hyperglycemia  R73.9 790.29     Patient Active Problem List   Diagnosis   • Gastroesophageal reflux disease   • Back pain   • Diabetic peripheral neuropathy (HCC)   • Hyperlipidemia   • Hypertension   • Vitamin D deficiency   • Osteoporosis   • Tobacco use   • Personal history of cardiac murmur   • Pelvic pain   • History of sepsis   • Migraines   • Urinary retention   • Depression with anxiety   • Primary insomnia   • Iron deficiency anemia secondary to inadequate dietary iron intake   • Vertigo   • Nausea & vomiting   • Severe malnutrition (HCC)   • Hypertensive urgency   • Uncontrolled type 1 diabetes mellitus with hyperglycemia (HCC)   • Non-intractable vomiting   • History of TIAs   • Gastroparesis   • Hypokalemia   • Dehydration   • Nausea vomiting and diarrhea   • Hypokalemia   • Elevated serum creatinine   • Thrombocytosis   • Hypercalcemia   • Elevated serum protein level   • Bacteriuria   • Moderate malnutrition (CMS/HCC)   • Hypomagnesemia   • Intractable nausea and vomiting   • Periumbilical abdominal pain     Past Medical History:   Diagnosis Date   • Acid reflux    • Acute bronchitis    • Cardiac murmur    • Diabetes mellitus (HCC)    • H/O echocardiogram 2012    i. LVEF 65%.ii. Mild LVH.iii. Borderline evidence of atrial septal aneurysm.  No PFO.    • History of nuclear stress test 2014    Negative for ischemia and scars; LVEF 77%.     • Hyperlipidemia    • Hypertension    • Impacted cerumen of both ears    • Migraine    • Self-catheterizes urinary bladder    • Sinusitis    • Stroke (HCC)    • Tobacco abuse     quit  4 days ago.     • Urticaria      Past Surgical History:   Procedure Laterality Date   • CAPSULE ENDOSCOPY  7/27/2021    Procedure: PILLCAM DEPLOYMENT;  Surgeon: Mikael Worthy MD;  Location:  [facility];  Service: Gastroenterology;;   • COLONOSCOPY     • COLONOSCOPY N/A 7/27/2021    Procedure: COLONOSCOPY;  Surgeon: Mikael Worthy MD;  Location:  [facility];  Service: Gastroenterology;  Laterality: N/A;   • DENTAL PROCEDURE     • ENDOSCOPY N/A 6/30/2021    Procedure: ESOPHAGOGASTRODUODENOSCOPY;  Surgeon: Brunner, Mark I, MD;  Location:  [facility];  Service: Gastroenterology;  Laterality: N/A;   • ENDOSCOPY N/A 7/27/2021    Procedure: ESOPHAGOGASTRODUODENOSCOPY;  Surgeon: Mikael Worthy MD;  Location:  [facility];  Service: Gastroenterology;  Laterality: N/A;   • ENDOSCOPY WITH JTUBE N/A 3/16/2022    Procedure: ESOPHAGOGASTRODUODENOSCOPY WITH JEJUNAL TUBE INSERTION;  Surgeon: Thom Glover MD;  Location:  [facility];  Service: Gastroenterology;  Laterality: N/A;   • NO PAST SURGERIES        General Information     Row Name 07/01/22 1148          Physical Therapy Time and Intention    Document Type evaluation  -LO     Mode of Treatment individual therapy;physical therapy  -LO     Row Name 07/01/22 1148          General Information    Patient Profile Reviewed yes  -LO     Prior Level of Function independent:;bed mobility;community mobility;gait;transfer  -LO     Existing Precautions/Restrictions fall;other (see comments)  contact spore  -LO     Barriers to Rehab none identified  -LO     Row Name 07/01/22 1148          Living Environment    People in Home child(bronson), adult  -LO     Row Name 07/01/22 1148          Home Main Entrance    Number of Stairs, Main Entrance twelve  -LO     Stair Railings, Main Entrance none  -LO     Row Name 07/01/22 1148          Stairs Within Home, Primary    Number of Stairs, Within Home, Primary none  -LO     Row Name 07/01/22 1148           Cognition    Orientation Status (Cognition) oriented x 4  -LO     Row Name 07/01/22 1148          Safety Issues, Functional Mobility    Safety Issues Affecting Function (Mobility) awareness of need for assistance;impulsivity;insight into deficits/self-awareness  -LO     Impairments Affecting Function (Mobility) balance;endurance/activity tolerance;postural/trunk control;strength  -LO           User Key  (r) = Recorded By, (t) = Taken By, (c) = Cosigned By    Initials Name Provider Type    Magali Hurtado PT Physical Therapist               Mobility     Row Name 07/01/22 1149          Bed Mobility    Bed Mobility supine-sit;sit-supine  -LO     Supine-Sit [place] (Bed Mobility) modified independence  -LO     Sit-Supine [place] (Bed Mobility) modified independence  -LO     Assistive Device (Bed Mobility) bed rails;head of bed elevated  -LO     Comment, (Bed Mobility) additional time for effort, no physical assist required  -LO     Row Name 07/01/22 1149          Transfers    Comment, (Transfers) EOB>stand> commode in BR> stand> EOB  -LO     Row Name 07/01/22 1149          Bed-Chair Transfer    Bed-Chair [place] (Transfers) not tested  -LO     Row Name 07/01/22 1149          Sit-Stand Transfer    Sit-Stand [place] (Transfers) minimum assist (75% patient effort);verbal cues;1 person assist  -LO     Assistive Device (Sit-Stand Transfers) other (see comments)  HHA  -LO     Row Name 07/01/22 1149          Gait/Stairs (Locomotion)    [place] Level (Gait) minimum assist (75% patient effort)  -LO     Assistive Device (Gait) other (see comments)  HHA  -LO     Distance in Feet (Gait) 25  -LO     Deviations/Abnormal Patterns (Gait) bilateral deviations;stride length decreased;miguel decreased  -LO     Bilateral Gait Deviations forward flexed posture  -LO     [place] Level (Stairs) not tested  -LO     Comment, (Gait/Stairs) Patient ambulates x 20 ' in room with HHA. Demonstrating narrow FORD, forward  flexed posture with mild unsteadiness.  -LO           User Key  (r) = Recorded By, (t) = Taken By, (c) = Cosigned By    Initials Name Provider Type    Magali Hurtado PT Physical Therapist               Obj/Interventions     Row Name 07/01/22 1151          Range of Motion Comprehensive    General Range of Motion bilateral lower extremity ROM WNL  -LO     Row Name 07/01/22 1151          Strength Comprehensive (MMT)    General Manual Muscle Testing (MMT) Assessment lower extremity strength deficits identified  -LO     Comment, General Manual Muscle Testing (MMT) Assessment BLE grossly 4-/5  -LO     Row Name 07/01/22 1151          Motor Skills    Motor Skills functional endurance  -LO     Functional Endurance requires rest after 1-2 min of continuous activity  -LO     Row Name 07/01/22 1151          Balance    Balance Assessment sitting static balance;sitting dynamic balance;standing static balance;standing dynamic balance  -LO     Static Sitting Balance standby assist  -LO     Dynamic Sitting Balance standby assist  -LO     Position, Sitting Balance unsupported;sitting edge of bed  -LO     Static Standing Balance minimal assist  -LO     Dynamic Standing Balance minimal assist  -LO     Position/Device Used, Standing Balance supported;other (see comments)  UE support  -LO     Comment, Balance Requires SPC and Bhavesh for safety in standing  -LO     Row Name 07/01/22 1151          Sensory Assessment (Somatosensory)    Sensory Assessment (Somatosensory) sensation intact  -LO           User Key  (r) = Recorded By, (t) = Taken By, (c) = Cosigned By    Initials Name Provider Type    Magali Hurtado PT Physical Therapist               Goals/Plan     Row Name 07/01/22 1156          Bed Mobility Goal 1 (PT)    Activity/Assistive Device (Bed Mobility Goal 1, PT) rolling to left;rolling to right;scooting;sit to supine;supine to sit  -LO     [place] Level/Cues Needed (Bed Mobility Goal 1, PT) independent  -LO     Time Frame  (Bed Mobility Goal 1, PT) long term goal (LTG);10 days  -LO     Row Name 07/01/22 1156          Transfer Goal 1 (PT)    Activity/Assistive Device (Transfer Goal 1, PT) sit-to-stand/stand-to-sit;bed-to-chair/chair-to-bed;car transfer  -LO     [place] Level/Cues Needed (Transfer Goal 1, PT) modified independence  -LO     Time Frame (Transfer Goal 1, PT) long term goal (LTG);10 days  -LO     Row Name 07/01/22 1156          Gait Training Goal 1 (PT)    Activity/Assistive Device (Gait Training Goal 1, PT) gait (walking locomotion);assistive device use;decrease fall risk;diminish gait deviation;improve balance and speed;increase endurance/gait distance;cane, straight  -LO     [place] Level (Gait Training Goal 1, PT) modified independence  -LO     Distance (Gait Training Goal 1, PT) 100  -LO     Time Frame (Gait Training Goal 1, PT) long term goal (LTG);10 days  -LO     Row Name 07/01/22 1156          Stairs Goal 1 (PT)    Activity/Assistive Device (Stairs Goal 1, PT) ascending stairs;descending stairs;cane, straight  -LO     [place] Level/Cues Needed (Stairs Goal 1, PT) contact guard required  -LO     Number of Stairs (Stairs Goal 1, PT) 12  -LO     Time Frame (Stairs Goal 1, PT) long term goal (LTG);10 days  -LO     Row Name 07/01/22 1156          Patient Education Goal (PT)    Activity (Patient Education Goal, PT) Patient will demonstrate safe and effective sequencing for bed mobility and transfers.  -LO     [place]/Cues/Accuracy (Memory Goal 2, PT) demonstrates adequately  -LO     Time Frame (Patient Education Goal, PT) long term goal (LTG);10 days  -LO     Row Name 07/01/22 1156          Therapy Assessment/Plan (PT)    Planned Therapy Interventions (PT) balance training;bed mobility training;gait training;home exercise program;patient/family education;neuromuscular re-education;stair training;strengthening;transfer training  -LO           User Key  (r) = Recorded By, (t) = Taken By, (c) = Cosigned  By    Initials Name Provider Type    Magali Hurtado, PT Physical Therapist               Clinical Impression     Row Name 07/01/22 1152          Pain    Additional Documentation Pain Scale: FACES Pre/Post-Treatment (Group)  -     Row Name 07/01/22 1152          Pain Scale: FACES Pre/Post-Treatment    Pain: FACES Scale, Pretreatment 2-->hurts little bit  -LO     Posttreatment Pain Rating 2-->hurts little bit  -LO     Pain Location - abdomen  -LO     Row Name 07/01/22 1152          Plan of Care Review    Plan of Care Reviewed With patient  -LO     Progress no change  -     Outcome Evaluation PT eval completed. Patient resting with eyes closed upon therapist arrival but easily becomes alert with conversation. Is alert and oriented x4. Presents with deficits in BLE strength, standing balance, gait quality, and functional endurance, effecting functional mobility below baseline. Requires Zeenat for bed mobility, CGA with HHA for transfers and ambulaiton x 25' in room. Patient will benefit from skilled IP PT services to address impairments for return to [place]. Recommend home with assist and HHPT at [place].  -     Row Name 07/01/22 1152          Therapy Assessment/Plan (PT)    Patient/Family Therapy Goals Statement (PT) return home  -LO     Rehab Potential (PT) good, to achieve stated therapy goals  -LO     Criteria for Skilled Interventions Met (PT) yes;meets criteria;skilled treatment is necessary  -LO     Therapy Frequency (PT) daily  -     Row Name 07/01/22 1152          Vital Signs    O2 Delivery Pre Treatment room air  -LO     O2 Delivery Intra Treatment room air  -LO     O2 Delivery Post Treatment room air  -LO     Pre Patient Position Side Lying  -LO     Intra Patient Position Standing  -LO     Post Patient Position Supine  -LO     Rest Breaks  1  -LO     Row Name 07/01/22 1152          Positioning and Restraints    Pre-Treatment Position in bed  -LO     Post Treatment Position bed  -LO     In Bed notified  nsg;supine;fowlers;exit alarm on;encouraged to call for assist;call light within reach  -LO           User Key  (r) = Recorded By, (t) = Taken By, (c) = Cosigned By    Initials Name Provider Type    Magali Hurtado PT Physical Therapist               Outcome Measures     Row Name 07/01/22 1158          How much help from another person do you currently need...    Turning from your back to your side while in flat bed without using bedrails? 4  -LO     Moving from lying on back to sitting on the side of a flat bed without bedrails? 3  -LO     Moving to and from a bed to a chair (including a wheelchair)? 3  -LO     Standing up from a chair using your arms (e.g., wheelchair, bedside chair)? 3  -LO     Climbing 3-5 steps with a railing? 3  -LO     To walk in hospital room? 3  -LO     AM-PAC 6 Clicks Score (PT) 19  -LO     Highest level of mobility 6 --> Walked 10 steps or more  -     Row Name 07/01/22 1158          Functional Assessment    Outcome Measure Options AM-PAC 6 Clicks Basic Mobility (PT)  -LO           User Key  (r) = Recorded By, (t) = Taken By, (c) = Cosigned By    Initials Name Provider Type    Magali Hurtado PT Physical Therapist                             Physical Therapy Education                 Title: PT OT SLP Therapies (In Progress)     Topic: Physical Therapy (Done)     Point: Mobility training (Done)     Learning Progress Summary           Patient Acceptance, E, VU,NR by  at 7/1/2022 1032    Comment: Patient education regarding PT POC                   Point: Home exercise program (Done)     Learning Progress Summary           Patient Acceptance, E, VU,NR by MELISSA at 7/1/2022 1032    Comment: Patient education regarding PT POC                   Point: Body mechanics (Done)     Learning Progress Summary           Patient Acceptance, E, VU,NR by  at 7/1/2022 1032    Comment: Patient education regarding PT POC                   Point: Precautions (Done)     Learning Progress Summary            Patient Acceptance, E, VU,NR by  at 7/1/2022 1032    Comment: Patient education regarding PT POC                               User Key     Initials Effective Dates Name Provider Type Discipline     06/16/21 -  Magali Gomez PT Physical Therapist PT              PT Recommendation and Plan  Planned Therapy Interventions (PT): balance training, bed mobility training, gait training, home exercise program, patient/family education, neuromuscular re-education, stair training, strengthening, transfer training  Plan of Care Reviewed With: patient  Progress: no change  Outcome Evaluation: PT eval completed. Patient resting with eyes closed upon therapist arrival but easily becomes alert with conversation. Is alert and oriented x4. Presents with deficits in BLE strength, standing balance, gait quality, and functional endurance, effecting functional mobility below baseline. Requires Zeenat for bed mobility, CGA with HHA for transfers and ambulaiton x 25' in room. Patient will benefit from skilled IP PT services to address impairments for return to PLOF. Recommend home with assist and HHPT at [place].     Time Calculation:    PT Charges     Row Name 07/01/22 1032             Time Calculation    Start Time 1032  -LO      PT Received On 07/01/22  -LO      PT Goal Re-Cert Due Date 07/11/22  -              Timed Charges    05165 - Gait Training Minutes  6  -LO      66118 - PT Therapeutic Activity Minutes 8  -LO              Untimed Charges    PT Eval/Re-eval Minutes 36  -LO              Total Minutes    Timed Charges Total Minutes 14  -LO      Untimed Charges Total Minutes 36  -LO       Total Minutes 50  -LO            User Key  (r) = Recorded By, (t) = Taken By, (c) = Cosigned By    Initials Name Provider Type     Magali Gomez, YUMI Physical Therapist              Therapy Charges for Today     Code Description Service Date Service Provider Modifiers Qty    11450452545  PT THERAPEUTIC ACT EA 15 MIN 7/1/2022 Magali Gomez, PT GP  1    [id number]  PT EVAL LOW COMPLEXITY 3 7/1/2022 Magali Gomez, PT GP 1          PT G-Codes  Outcome Measure Options: AM-PAC 6 Clicks Basic Mobility (PT)  AM-PAC 6 Clicks Score (PT): 19  AM-PAC 6 Clicks Score (OT): 17    Magali Gomez, YUMI  7/1/2022

## 2022-07-01 NOTE — PLAN OF CARE
Goal Outcome Evaluation:   Pt doing well, on clear liquids as ordered.  No complaints of pain or nausea.  Alert and oriented.  Potassium replaced during the day yesterday, recheck was 3.4, replacing again through IV since pt unable to tolerate pills or packet.  No BM yet, maintaining contact precautions.  VSS.  Bed alarm on, bed in lowest position and call light within reach.  Will continue to monitor.    Meche Pena RN

## 2022-07-01 NOTE — PROGRESS NOTES
[facility] Medicine Services  PROGRESS NOTE    Patient Name: Thelma Michael  : 1960  MRN: [id number]    Date of Admission: 2022  Primary Care Physician: Monet Howe APRN    Subjective   Subjective     CC:  Gastroparesis    HPI:  Sugar in the 40s this morning.  Patient awakens to voice and goes back to sleep.  States she will eat her food and a little while but wants to sleep right now    ROS:  Gen- No fevers, chills  CV- No chest pain, palpitations  Resp- No cough, dyspnea  GI- +abd pain      Objective   Objective     Vital Signs:   Temp:  [98 °F (36.7 °C)-98.3 °F (36.8 °C)] 98.3 °F (36.8 °C)  Heart Rate:  [70-88] 88  Resp:  [16-20] 18  BP: (124-149)/(57-86) 143/86     Physical Exam:  Constitutional: Sleeping but awakens easily to voice  HENT: NCAT, mucous membranes moist  Respiratory: Respiratory effort normal   Cardiovascular: RRR, no murmurs, rubs, or gallops  Gastrointestinal: soft, not distended  Musculoskeletal: No bilateral ankle edema  Psychiatric: Appropriate affect, cooperative  Neurologic: Oriented x 3, speech clear  Skin: No rashes      Results Reviewed:  LAB RESULTS:      Lab 2243 22  1145   WBC 5.38 7.57  --  7.72   HEMOGLOBIN 9.9* 10.8*  --  13.8   HEMATOCRIT 28.8* 31.1*  --  38.7   PLATELETS 244 283  --  383   NEUTROS ABS 2.78  --   --  5.74   IMMATURE GRANS (ABS) 0.02  --   --  0.02   LYMPHS ABS 2.10  --   --  1.51   MONOS ABS 0.44  --   --  0.44   EOS ABS 0.04  --   --  0.00   MCV 87.5 85.7  --  84.7   PROCALCITONIN  --   --  0.14  --    LACTATE  --   --  1.0  --          Lab 22  2346 2243 22  1145   SODIUM 139  --  139 138   POTASSIUM 3.5 3.4* 3.3* 2.9*   CHLORIDE 108*  --  104 95*   CO2 22.0  --  26.0 24.0   ANION GAP 9.0  --  9.0 19.0*   BUN 33*  --  55* 59*   CREATININE 1.39*  --  2.00* 2.27*   EGFR 42.7*  --  27.6* 23.7*   GLUCOSE 44*  --  102* 327*    CALCIUM 8.4*  --  8.6 9.6   MAGNESIUM  --   --   --  2.0   PHOSPHORUS 2.3*  --   --   --    HEMOGLOBIN A1C  --   --  6.70*  --    TSH  --   --  0.362  --          Lab 07/01/22  0426 06/29/22 2002 06/29/22  1145   TOTAL PROTEIN  --   --  7.5   ALBUMIN 3.20*  --  4.40   GLOBULIN  --   --  3.1   ALT (SGPT)  --   --  8   AST (SGOT)  --   --  12   BILIRUBIN  --   --  0.7   ALK PHOS  --   --  98   LIPASE  --  25  --          Lab 06/29/22  1145   TROPONIN T 0.015         Lab 06/30/22  0943   CHOLESTEROL 218*   LDL CHOL 146*   HDL CHOL 51   TRIGLYCERIDES 115             Lab 06/29/22  1607   PH, ARTERIAL 7.450   PCO2, ARTERIAL 38.9   PO2 ART 83.4   FIO2 21   HCO3 ART 27.0*   BASE EXCESS ART 2.9*   CARBOXYHEMOGLOBIN 1.0     Brief Urine Lab Results  (Last result in the past 365 days)      Color   Clarity   Blood   Leuk Est   Nitrite   Protein   CREAT   Urine HCG        06/29/22 1319 Yellow   Clear   Trace   Negative   Negative   >=300 mg/dL (3+)                 Microbiology Results Abnormal     Procedure Component Value - Date/Time    COVID PRE-OP / PRE-PROCEDURE SCREENING ORDER (NO ISOLATION) - Swab, Nasopharynx [[id number]]  (Normal) Collected: 06/29/22 1336    Lab Status: Final result Specimen: Swab from Nasopharynx Updated: 06/29/22 1448    Narrative:      The following orders were created for panel order COVID PRE-OP / PRE-PROCEDURE SCREENING ORDER (NO ISOLATION) - Swab, Nasopharynx.  Procedure                               Abnormality         Status                     ---------                               -----------         ------                     COVID-19 and FLU A/B PCR...[[id number]]  Normal              Final result                 Please view results for these tests on the individual orders.    COVID-19 and FLU A/B PCR - Swab, Nasopharynx [[id number]]  (Normal) Collected: 06/29/22 1336    Lab Status: Final result Specimen: Swab from Nasopharynx Updated: 06/29/22 1448     COVID19 Not Detected     Influenza A  PCR Not Detected     Influenza B PCR Not Detected    Narrative:      Fact sheet for providers: https://www.fda.gov/media/753070/download    Fact sheet for patients: https://www.fda.gov/media/989792/download    Test performed by PCR.          CT Abdomen Pelvis Without Contrast    Result Date: 6/29/2022  EXAM: CT ABDOMEN PELVIS WO CONTRAST-  DATE OF EXAM: 6/29/2022 2:10 PM  INDICATION: ABDOMINAL PAIN/NAUSEA/VOMITING/HISTORY OF GASTROPARESIS.  COMPARISON: CT abdomen and pelvis dated 3/26/2022  TECHNIQUE: Contiguous axial CT images were obtained from the lung bases to the pubic symphysis without contrast. Sagittal and coronal reconstructions were performed.  Automated exposure control and iterative reconstruction methods were used.  FINDINGS: The lack of intravenous contrast limits the evaluation of visceral and vascular structures. The visualized lung bases appear clear.  The liver is normal in size and contour. There is no focal hepatic lesion by noncontrast technique. The gallbladder is present without wall thickening. There is no intrahepatic or extra hepatic biliary ductal dilatation. The spleen is normal in size. There are bilateral low-density adrenal nodules with density compatible with benign lipid rich adrenal adenomas. The pancreas appears unremarkable for noncontrast technique.  The kidneys are symmetric in size. There is no hydronephrosis or urolithiasis. There is nonspecific circumferential bladder wall thickening. This appears similar to the prior examination. This could relate to cystitis in the correct clinical setting. The uterus is present and anteverted. There are no adnexal masses.  The stomach and duodenum are normal in caliber and configuration. There are no abnormally dilated loops of small bowel to suggest small bowel obstruction or small bowel inflammation. The appendix is well-visualized and normal within the right lower quadrant. There is no evidence of acute colitis or diverticulitis. There  is no free fluid or free air.  The aorta is normal in caliber without evidence of aneurysm formation. There is no mesenteric, retroperitoneal, or pelvic lymphadenopathy. There is degenerative disc disease at L5-S1.      Impression: 1. No acute intra-abdominal or pelvic abnormality. No evidence of gastric distention or gastric outlet obstruction. 2. Nonspecific circumferential bladder wall thickening. This can be seen with cystitis in the correct clinical setting. Correlate clinically with urinalysis. 3. Bilateral low-density lipid rich adrenal adenomas.   This report was finalized on 6/29/2022 2:52 PM by Rakesh Azul MD.        Results for orders placed during the hospital encounter of 11/19/19    Adult Transesophageal Echo (LIZANDRO) W/ Cont if Necessary Per Protocol    Interpretation Summary  · Left ventricular systolic function is normal. Estimated EF = 65%.  · Left ventricular wall thickness is consistent with hypertrophy.  · Small patent foramen ovale present. Saline test results are positive with valsalva manuever.  · There is mild mitral valve prolapse of the anterior mitral leaflet.  · Mild tricuspid valve regurgitation is present.  · Estimated right ventricular systolic pressure from tricuspid regurgitation is mildly elevated (35-45 mmHg).  · There is mild plaque in the descending aorta present.      I have reviewed the medications:  Scheduled Meds:amLODIPine, 10 mg, Oral, Daily  aspirin, 81 mg, Oral, Daily  atorvastatin, 80 mg, Oral, Nightly  donepezil, 5 mg, Oral, Nightly  ferrous sulfate, 325 mg, Oral, Daily With Breakfast  FLUoxetine, 20 mg, Oral, Daily  heparin (porcine), 5,000 Units, Subcutaneous, Q8H  [START ON 7/2/2022] insulin detemir, 10 Units, Subcutaneous, Daily  insulin lispro, 0-9 Units, Subcutaneous, TID AC  Insulin Lispro, 5 Units, Subcutaneous, TID With Meals  metoclopramide, 10 mg, Intravenous, 4x Daily AC & at Bedtime  pantoprazole, 40 mg, Oral, Daily  potassium chloride, 10 mEq,  Intravenous, Once   And  potassium chloride, 10 mEq, Intravenous, Once   And  potassium chloride, 10 mEq, Intravenous, Once  sodium chloride, 10 mL, Intravenous, Q12H  terazosin, 1 mg, Oral, Nightly      Continuous Infusions:   PRN Meds:.•  acetaminophen **OR** acetaminophen **OR** acetaminophen  •  albuterol sulfate HFA  •  aluminum-magnesium hydroxide-simethicone  •  calcium carbonate  •  dextrose  •  dextrose  •  gabapentin  •  glucagon (human recombinant)  •  magnesium sulfate **OR** magnesium sulfate **OR** magnesium sulfate  •  melatonin  •  ondansetron **OR** ondansetron  •  potassium chloride **OR** potassium chloride **OR** potassium chloride  •  potassium phosphate infusion greater than 15 mMoles **OR** potassium phosphate **OR** potassium phosphate  •  sodium chloride  •  sodium chloride  •  traMADol  •  traZODone    Assessment & Plan   Assessment & Plan     Active Hospital Problems    Diagnosis  POA   • **Elevated serum creatinine [R79.89]  Yes   • Intractable nausea and vomiting [R11.2]  Yes   • Dehydration [E86.0]  Yes   • Hypokalemia [E87.6]  Yes   • Gastroparesis [K31.84]  Yes   • History of TIAs [Z86.73]  Not Applicable   • Uncontrolled type 1 diabetes mellitus with hyperglycemia (HCC) [E10.65]  Yes   • Diabetic peripheral neuropathy (HCC) [E11.42]  Yes   • Hypertension [I10]  Yes   • Hyperlipidemia [E78.5]  Yes   • Gastroesophageal reflux disease [K21.9]  Yes      Resolved Hospital Problems   No resolved problems to display.        Brief Hospital Course to date:  Thelma Michael is a 61 y.o. female with history of type 1 diabetes, gastroparesis and recent C. difficile in March 2022 for nausea vomiting diarrhea.  Diarrhea has resolved.  But she is still having intermittent abdominal pain     Gastroparesis  N/V/D and abdominal pain  H/o recent C diff  -Known gastroparesis, will need to follow-up with motility clinic outpatient (has appt there on 7/5/22)  -GI consulted  -Continue IVF     RAFAT  --  Renal function slowly improving, recheck in am     Essential hypertension  -continue amlodipine, HCTZ on hold now     Diabetes mellitus 1  -Has insulin pump, currently off per nursing  --Change Levemir to 10 units daily, instead of at night.  Had episode of hypoglycemia early this morning  -- A1c 6.7     Hypokalemia  Hypomagnesemia  -Electrolyte replacement  -Continue to monitor        Hyperlipidemia  -continue statin      Expected Discharge Location and Transportation: Home  Expected Discharge Date: Possibly tomorrow if tolerating diet    DVT prophylaxis:  Medical DVT prophylaxis orders are present.     AM-PAC 6 Clicks Score (PT): 19 (07/01/22 3282)    CODE STATUS:   Code Status and Medical Interventions:   Ordered at: 06/29/22 2431     Level Of Support Discussed With:    Patient     Code Status (Patient has no pulse and is not breathing):    CPR (Attempt to Resuscitate)     Medical Interventions (Patient has pulse or is breathing):    Full Support       Karley Salas, DO  07/01/22

## 2022-07-02 LAB
ALBUMIN SERPL-MCNC: 3.5 G/DL (ref 3.5–5.2)
ANION GAP SERPL CALCULATED.3IONS-SCNC: 10 MMOL/L (ref 5–15)
BASOPHILS # BLD AUTO: 0 10*3/MM3 (ref 0–0.2)
BASOPHILS NFR BLD AUTO: 0 % (ref 0–1.5)
BUN SERPL-MCNC: 18 MG/DL (ref 8–23)
BUN/CREAT SERPL: 14.6 (ref 7–25)
CALCIUM SPEC-SCNC: 8.6 MG/DL (ref 8.6–10.5)
CHLORIDE SERPL-SCNC: 105 MMOL/L (ref 98–107)
CO2 SERPL-SCNC: 25 MMOL/L (ref 22–29)
CREAT SERPL-MCNC: 1.23 MG/DL (ref 0.57–1)
DEPRECATED RDW RBC AUTO: 41.3 FL (ref 37–54)
EGFRCR SERPLBLD CKD-EPI 2021: 49.5 ML/MIN/1.73
EOSINOPHIL # BLD AUTO: 0.02 10*3/MM3 (ref 0–0.4)
EOSINOPHIL NFR BLD AUTO: 0.3 % (ref 0.3–6.2)
ERYTHROCYTE [DISTWIDTH] IN BLOOD BY AUTOMATED COUNT: 12.7 % (ref 12.3–15.4)
GLUCOSE BLDC GLUCOMTR-MCNC: 152 MG/DL (ref 70–130)
GLUCOSE BLDC GLUCOMTR-MCNC: 187 MG/DL (ref 70–130)
GLUCOSE BLDC GLUCOMTR-MCNC: 198 MG/DL (ref 70–130)
GLUCOSE BLDC GLUCOMTR-MCNC: 230 MG/DL (ref 70–130)
GLUCOSE BLDC GLUCOMTR-MCNC: 47 MG/DL (ref 70–130)
GLUCOSE BLDC GLUCOMTR-MCNC: 59 MG/DL (ref 70–130)
GLUCOSE BLDC GLUCOMTR-MCNC: 63 MG/DL (ref 70–130)
GLUCOSE BLDC GLUCOMTR-MCNC: 64 MG/DL (ref 70–130)
GLUCOSE SERPL-MCNC: 173 MG/DL (ref 65–99)
HCT VFR BLD AUTO: 32.4 % (ref 34–46.6)
HGB BLD-MCNC: 10.9 G/DL (ref 12–15.9)
IMM GRANULOCYTES # BLD AUTO: 0.02 10*3/MM3 (ref 0–0.05)
IMM GRANULOCYTES NFR BLD AUTO: 0.3 % (ref 0–0.5)
LYMPHOCYTES # BLD AUTO: 2.26 10*3/MM3 (ref 0.7–3.1)
LYMPHOCYTES NFR BLD AUTO: 35.8 % (ref 19.6–45.3)
MCH RBC QN AUTO: 29.6 PG (ref 26.6–33)
MCHC RBC AUTO-ENTMCNC: 33.6 G/DL (ref 31.5–35.7)
MCV RBC AUTO: 88 FL (ref 79–97)
MONOCYTES # BLD AUTO: 0.41 10*3/MM3 (ref 0.1–0.9)
MONOCYTES NFR BLD AUTO: 6.5 % (ref 5–12)
NEUTROPHILS NFR BLD AUTO: 3.61 10*3/MM3 (ref 1.7–7)
NEUTROPHILS NFR BLD AUTO: 57.1 % (ref 42.7–76)
NRBC BLD AUTO-RTO: 0 /100 WBC (ref 0–0.2)
PHOSPHATE SERPL-MCNC: 3.1 MG/DL (ref 2.5–4.5)
PLATELET # BLD AUTO: 247 10*3/MM3 (ref 140–450)
PMV BLD AUTO: 11.1 FL (ref 6–12)
POTASSIUM SERPL-SCNC: 3.6 MMOL/L (ref 3.5–5.2)
RBC # BLD AUTO: 3.68 10*6/MM3 (ref 3.77–5.28)
SODIUM SERPL-SCNC: 140 MMOL/L (ref 136–145)
WBC NRBC COR # BLD: 6.32 10*3/MM3 (ref 3.4–10.8)

## 2022-07-02 PROCEDURE — 99212 OFFICE O/P EST SF 10 MIN: CPT | Performed by: NURSE PRACTITIONER

## 2022-07-02 PROCEDURE — 25010000002 ONDANSETRON PER 1 MG: Performed by: INTERNAL MEDICINE

## 2022-07-02 PROCEDURE — 25010000002 METOCLOPRAMIDE PER 10 MG: Performed by: NURSE PRACTITIONER

## 2022-07-02 PROCEDURE — 96375 TX/PRO/DX INJ NEW DRUG ADDON: CPT

## 2022-07-02 PROCEDURE — G0378 HOSPITAL OBSERVATION PER HR: HCPCS

## 2022-07-02 PROCEDURE — 96372 THER/PROPH/DIAG INJ SC/IM: CPT

## 2022-07-02 PROCEDURE — 85025 COMPLETE CBC W/AUTO DIFF WBC: CPT | Performed by: INTERNAL MEDICINE

## 2022-07-02 PROCEDURE — 99225 PR SBSQ OBSERVATION CARE/DAY 25 MINUTES: CPT | Performed by: INTERNAL MEDICINE

## 2022-07-02 PROCEDURE — 63710000001 INSULIN LISPRO (HUMAN) PER 5 UNITS: Performed by: INTERNAL MEDICINE

## 2022-07-02 PROCEDURE — 80069 RENAL FUNCTION PANEL: CPT | Performed by: INTERNAL MEDICINE

## 2022-07-02 PROCEDURE — 82962 GLUCOSE BLOOD TEST: CPT

## 2022-07-02 PROCEDURE — 96376 TX/PRO/DX INJ SAME DRUG ADON: CPT

## 2022-07-02 PROCEDURE — 25010000002 HEPARIN (PORCINE) PER 1000 UNITS: Performed by: INTERNAL MEDICINE

## 2022-07-02 RX ORDER — INSULIN LISPRO 100 [IU]/ML
0-7 INJECTION, SOLUTION INTRAVENOUS; SUBCUTANEOUS
Status: DISCONTINUED | OUTPATIENT
Start: 2022-07-02 | End: 2022-07-02

## 2022-07-02 RX ORDER — INSULIN LISPRO 100 [IU]/ML
2 INJECTION, SOLUTION INTRAVENOUS; SUBCUTANEOUS
Status: DISCONTINUED | OUTPATIENT
Start: 2022-07-02 | End: 2022-07-02

## 2022-07-02 RX ADMIN — INSULIN LISPRO 2 UNITS: 100 INJECTION, SOLUTION INTRAVENOUS; SUBCUTANEOUS at 08:56

## 2022-07-02 RX ADMIN — HEPARIN SODIUM 5000 UNITS: 5000 INJECTION INTRAVENOUS; SUBCUTANEOUS at 18:27

## 2022-07-02 RX ADMIN — ATORVASTATIN CALCIUM 80 MG: 40 TABLET, FILM COATED ORAL at 21:50

## 2022-07-02 RX ADMIN — Medication 15 G: at 21:48

## 2022-07-02 RX ADMIN — DEXTROSE MONOHYDRATE 25 G: 25 INJECTION, SOLUTION INTRAVENOUS at 11:11

## 2022-07-02 RX ADMIN — Medication 5 MG: at 21:50

## 2022-07-02 RX ADMIN — GABAPENTIN 400 MG: 400 CAPSULE ORAL at 21:50

## 2022-07-02 RX ADMIN — AMLODIPINE BESYLATE 10 MG: 10 TABLET ORAL at 08:56

## 2022-07-02 RX ADMIN — METOCLOPRAMIDE 10 MG: 5 INJECTION, SOLUTION INTRAMUSCULAR; INTRAVENOUS at 21:50

## 2022-07-02 RX ADMIN — FERROUS SULFATE TAB 325 MG (65 MG ELEMENTAL FE) 325 MG: 325 (65 FE) TAB at 08:57

## 2022-07-02 RX ADMIN — TERAZOSIN HYDROCHLORIDE 1 MG: 1 CAPSULE ORAL at 21:50

## 2022-07-02 RX ADMIN — PANTOPRAZOLE SODIUM 40 MG: 40 TABLET, DELAYED RELEASE ORAL at 08:56

## 2022-07-02 RX ADMIN — METOCLOPRAMIDE 10 MG: 5 INJECTION, SOLUTION INTRAMUSCULAR; INTRAVENOUS at 18:28

## 2022-07-02 RX ADMIN — DONEPEZIL HYDROCHLORIDE 5 MG: 5 TABLET ORAL at 21:50

## 2022-07-02 RX ADMIN — METOCLOPRAMIDE 10 MG: 5 INJECTION, SOLUTION INTRAMUSCULAR; INTRAVENOUS at 12:31

## 2022-07-02 RX ADMIN — HEPARIN SODIUM 5000 UNITS: 5000 INJECTION INTRAVENOUS; SUBCUTANEOUS at 05:10

## 2022-07-02 RX ADMIN — INSULIN LISPRO 5 UNITS: 100 INJECTION, SOLUTION INTRAVENOUS; SUBCUTANEOUS at 08:57

## 2022-07-02 RX ADMIN — FLUOXETINE 20 MG: 20 CAPSULE ORAL at 08:56

## 2022-07-02 RX ADMIN — ALUMINUM HYDROXIDE, MAGNESIUM HYDROXIDE, AND DIMETHICONE 15 ML: 400; 400; 40 SUSPENSION ORAL at 01:14

## 2022-07-02 RX ADMIN — Medication 10 ML: at 21:57

## 2022-07-02 RX ADMIN — ASPIRIN 81 MG: 81 TABLET, COATED ORAL at 08:56

## 2022-07-02 RX ADMIN — TRAMADOL HYDROCHLORIDE 50 MG: 50 TABLET, COATED ORAL at 12:31

## 2022-07-02 RX ADMIN — Medication 10 ML: at 01:15

## 2022-07-02 RX ADMIN — ONDANSETRON 4 MG: 2 INJECTION INTRAMUSCULAR; INTRAVENOUS at 01:14

## 2022-07-02 RX ADMIN — METOCLOPRAMIDE 10 MG: 5 INJECTION, SOLUTION INTRAMUSCULAR; INTRAVENOUS at 08:55

## 2022-07-02 RX ADMIN — GABAPENTIN 400 MG: 400 CAPSULE ORAL at 01:14

## 2022-07-02 NOTE — PROGRESS NOTES
[facility] Medicine Services  PROGRESS NOTE    Patient Name: Thelma Michael  : 1960  MRN: [id number]    Date of Admission: 2022  Primary Care Physician: Monet Howe APRN    Subjective   Subjective     CC:  Gastroparesis    HPI:  Had some low blood sugars again this morning. Talked with nurse Dian about this. I talked with the patient as well. She sees endocrinology and has an insulin pump. She takes 20 units of insulin every 3 days. She reports recurrent episodes of gastroparesis every few months.    ROS:  Gen- No fevers, chills  CV- No chest pain, palpitations  Resp- No cough, dyspnea  GI- No N/V/D, + abd pain          Objective   Objective     Vital Signs:   Temp:  [98.1 °F (36.7 °C)-99.3 °F (37.4 °C)] 98.2 °F (36.8 °C)  Heart Rate:  [67-93] 67  Resp:  [16-18] 18  BP: (104-195)/(59-98) 104/59     Physical Exam:  Constitutional: No acute distress, awake, alert  HENT: NCAT, mucous membranes moist  Respiratory: Clear to auscultation bilaterally, respiratory effort normal   Cardiovascular: RRR, no murmurs, rubs, or gallops  Gastrointestinal: Positive bowel sounds, soft, nontender, nondistended  Musculoskeletal: No bilateral ankle edema  Psychiatric: Appropriate affect, cooperative  Neurologic: Oriented x 3, strength symmetric in all extremities, Cranial Nerves grossly intact to confrontation, speech clear  Skin: No rashes    Results Reviewed:  LAB RESULTS:      Lab 22  0522 22  0426 22  0943 22  1145   WBC 6.32 5.38 7.57  --  7.72   HEMOGLOBIN 10.9* 9.9* 10.8*  --  13.8   HEMATOCRIT 32.4* 28.8* 31.1*  --  38.7   PLATELETS 247 244 283  --  383   NEUTROS ABS 3.61 2.78  --   --  5.74   IMMATURE GRANS (ABS) 0.02 0.02  --   --  0.02   LYMPHS ABS 2.26 2.10  --   --  1.51   MONOS ABS 0.41 0.44  --   --  0.44   EOS ABS 0.02 0.04  --   --  0.00   MCV 88.0 87.5 85.7  --  84.7   PROCALCITONIN  --   --   --  0.14  --    LACTATE  --    --   --  1.0  --          Lab 07/02/22  0522 07/01/22  1154 07/01/22 0426 06/30/22  2346 06/30/22 0943 06/29/22  1145   SODIUM 140  --  139  --  139 138   POTASSIUM 3.6 4.0 3.5 3.4* 3.3* 2.9*   CHLORIDE 105  --  108*  --  104 95*   CO2 25.0  --  22.0  --  26.0 24.0   ANION GAP 10.0  --  9.0  --  9.0 19.0*   BUN 18  --  33*  --  55* 59*   CREATININE 1.23*  --  1.39*  --  2.00* 2.27*   EGFR 49.5*  --  42.7*  --  27.6* 23.7*   GLUCOSE 173*  --  44*  --  102* 327*   CALCIUM 8.6  --  8.4*  --  8.6 9.6   MAGNESIUM  --   --   --   --   --  2.0   PHOSPHORUS 3.1  --  2.3*  --   --   --    HEMOGLOBIN A1C  --   --   --   --  6.70*  --    TSH  --   --   --   --  0.362  --          Lab 07/02/22  0522 07/01/22  0426 06/29/22 2002 06/29/22  1145   TOTAL PROTEIN  --   --   --  7.5   ALBUMIN 3.50 3.20*  --  4.40   GLOBULIN  --   --   --  3.1   ALT (SGPT)  --   --   --  8   AST (SGOT)  --   --   --  12   BILIRUBIN  --   --   --  0.7   ALK PHOS  --   --   --  98   LIPASE  --   --  25  --          Lab 06/29/22  1145   TROPONIN T 0.015         Lab 06/30/22  0943   CHOLESTEROL 218*   LDL CHOL 146*   HDL CHOL 51   TRIGLYCERIDES 115             Lab 06/29/22  1607   PH, ARTERIAL 7.450   PCO2, ARTERIAL 38.9   PO2 ART 83.4   FIO2 21   HCO3 ART 27.0*   BASE EXCESS ART 2.9*   CARBOXYHEMOGLOBIN 1.0     Brief Urine Lab Results  (Last result in the past 365 days)      Color   Clarity   Blood   Leuk Est   Nitrite   Protein   CREAT   Urine HCG        06/29/22 1319 Yellow   Clear   Trace   Negative   Negative   >=300 mg/dL (3+)                 Microbiology Results Abnormal     Procedure Component Value - Date/Time    COVID PRE-OP / PRE-PROCEDURE SCREENING ORDER (NO ISOLATION) - Swab, Nasopharynx [[id number]]  (Normal) Collected: 06/29/22 1336    Lab Status: Final result Specimen: Swab from Nasopharynx Updated: 06/29/22 1448    Narrative:      The following orders were created for panel order COVID PRE-OP / PRE-PROCEDURE SCREENING ORDER (NO  ISOLATION) - Swab, Nasopharynx.  Procedure                               Abnormality         Status                     ---------                               -----------         ------                     COVID-19 and FLU A/B PCR...[[id number]]  Normal              Final result                 Please view results for these tests on the individual orders.    COVID-19 and FLU A/B PCR - Swab, Nasopharynx [[id number]]  (Normal) Collected: 06/29/22 1336    Lab Status: Final result Specimen: Swab from Nasopharynx Updated: 06/29/22 1448     COVID19 Not Detected     Influenza A PCR Not Detected     Influenza B PCR Not Detected    Narrative:      Fact sheet for providers: https://www.fda.gov/media/296004/download    Fact sheet for patients: https://www.fda.gov/media/439068/download    Test performed by PCR.          No radiology results from the last 24 hrs    Results for orders placed during the hospital encounter of 11/19/19    Adult Transesophageal Echo (LIZANDRO) W/ Cont if Necessary Per Protocol    Interpretation Summary  · Left ventricular systolic function is normal. Estimated EF = 65%.  · Left ventricular wall thickness is consistent with hypertrophy.  · Small patent foramen ovale present. Saline test results are positive with valsalva manuever.  · There is mild mitral valve prolapse of the anterior mitral leaflet.  · Mild tricuspid valve regurgitation is present.  · Estimated right ventricular systolic pressure from tricuspid regurgitation is mildly elevated (35-45 mmHg).  · There is mild plaque in the descending aorta present.      I have reviewed the medications:  Scheduled Meds:amLODIPine, 10 mg, Oral, Daily  aspirin, 81 mg, Oral, Daily  atorvastatin, 80 mg, Oral, Nightly  donepezil, 5 mg, Oral, Nightly  ferrous sulfate, 325 mg, Oral, Daily With Breakfast  FLUoxetine, 20 mg, Oral, Daily  heparin (porcine), 5,000 Units, Subcutaneous, Q8H  [START ON 7/3/2022] insulin detemir, 3 Units, Subcutaneous, Q12H  insulin lispro,  0-7 Units, Subcutaneous, TID AC  Insulin Lispro, 2 Units, Subcutaneous, TID With Meals  metoclopramide, 10 mg, Intravenous, 4x Daily AC & at Bedtime  pantoprazole, 40 mg, Oral, Daily  potassium chloride, 10 mEq, Intravenous, Once   And  potassium chloride, 10 mEq, Intravenous, Once   And  potassium chloride, 10 mEq, Intravenous, Once  sodium chloride, 10 mL, Intravenous, Q12H  terazosin, 1 mg, Oral, Nightly      Continuous Infusions:   PRN Meds:.•  acetaminophen **OR** acetaminophen **OR** acetaminophen  •  albuterol sulfate HFA  •  aluminum-magnesium hydroxide-simethicone  •  calcium carbonate  •  dextrose  •  dextrose  •  gabapentin  •  glucagon (human recombinant)  •  magnesium sulfate **OR** magnesium sulfate **OR** magnesium sulfate  •  melatonin  •  ondansetron **OR** ondansetron  •  potassium chloride **OR** potassium chloride **OR** potassium chloride  •  potassium phosphate infusion greater than 15 mMoles **OR** potassium phosphate **OR** potassium phosphate  •  sodium chloride  •  sodium chloride  •  traMADol  •  traZODone    Assessment & Plan   Assessment & Plan     Active Hospital Problems    Diagnosis  POA   • **Elevated serum creatinine [R79.89]  Yes   • Intractable nausea and vomiting [R11.2]  Yes   • Dehydration [E86.0]  Yes   • Hypokalemia [E87.6]  Yes   • Gastroparesis [K31.84]  Yes   • History of TIAs [Z86.73]  Not Applicable   • Uncontrolled type 1 diabetes mellitus with hyperglycemia (HCC) [E10.65]  Yes   • Diabetic peripheral neuropathy (HCC) [E11.42]  Yes   • Hypertension [I10]  Yes   • Hyperlipidemia [E78.5]  Yes   • Gastroesophageal reflux disease [K21.9]  Yes      Resolved Hospital Problems   No resolved problems to display.        Brief Hospital Course to date:  Thelma Michael is a 61 y.o. female with history of type 1 diabetes, gastroparesis and recent C. difficile in March 2022 for nausea vomiting diarrhea.  Diarrhea has resolved.  But she is still having intermittent abdominal  pain     Gastroparesis  N/V/D and abdominal pain  H/o recent C diff  -PO improving, will monitor one more day     RAFAT  -- Renal function slowly improving, still not back to baseline. Holding HCTZ. Encouraging PO intake.     Essential hypertension  -continue amlodipine, HCTZ on hold now     Diabetes mellitus 1  -Has insulin pump, still with hypoglycemia with subcutaneous insulin. Will decrease doses of insulin and monitor. Anticipate getting her back on the pump asap.  -- A1c 6.7     Hypokalemia  Hypomagnesemia  -Electrolyte replacement  -Continue to monitor        Hyperlipidemia  -continue statin      Expected Discharge Location and Transportation: Home  Expected Discharge Date: tomorrow    DVT prophylaxis:  Medical DVT prophylaxis orders are present.     AM-PAC 6 Clicks Score (PT): 21 (07/02/22 0800)    CODE STATUS:   Code Status and Medical Interventions:   Ordered at: 06/29/22 8804     Level Of Support Discussed With:    Patient     Code Status (Patient has no pulse and is not breathing):    CPR (Attempt to Resuscitate)     Medical Interventions (Patient has pulse or is breathing):    Full Support       Jose Joy DO  07/02/22

## 2022-07-02 NOTE — PLAN OF CARE
Goal Outcome Evaluation:           Progress: no change  Outcome Evaluation: B/P slightly elevated; continues have intermittent nasuea  and dry heaves not tolerating her diet or fluids; UOP is adequate; pt states/ plan for discharge today; call light within her reach; will continue to monitor

## 2022-07-02 NOTE — PROGRESS NOTES
Malnutrition Severity Assessment    Patient Name:  Thelma Michael  YOB: 1960  MRN: [id number]  Admit Date:  6/29/2022    Patient meets criteria for : Moderate (non-severe) Malnutrition (Pt meets criteria for non severe chronic malnutriltion based on wasting.)    Comments:      Malnutrition Severity Assessment  Malnutrition Type: Chronic Disease - Related Malnutrition  Malnutrition Type (last 8 hours)     Malnutrition Severity Assessment     Row Name 07/01/22 2337       Malnutrition Severity Assessment    Malnutrition Type Chronic Disease - Related Malnutrition    Row Name 07/01/22 2337       Insufficient Energy Intake     Insufficient Energy Intake Findings --  unable to quantify    Row Name 07/01/22 2337       Unintentional Weight Loss     Unintentional Weight Loss Findings --  loss of 6lbs 5% of wt over 2 1/2 mo unclear amt that may be fluid    Row Name 07/01/22 2337       Muscle Loss    Loss of Muscle Mass Findings Moderate    [place] Region --  mild    Clavicle Bone Region --  mild    Acromion Bone Region Moderate - acromion may slightly protrude    Scapular Bone Region Moderate - mild depression, bones may show slightly    Dorsal Hand Region Moderate - slight depression    Patellar Region Severe - prominent bone, square looking, very little muscle definition    Anterior Thigh Region Moderate - mild depression on inner thigh    Posterior Calf Region Moderate - some roundness, slight firmness    Row Name 07/01/22 2337       Fat Loss    Subcutaneous Fat Loss Findings Mild    Orbital Region  Moderate -  somewhat hollowness, slightly dark circles    Upper Arm Region --  mild    Thoracic & Lumbar Region --  mild    Row Name 07/01/22 2337       Criteria Met (Must meet criteria for severity in at least 2 of these categories: M Wasting, Fat Loss, Fluid, Secondary Signs, Wt. Status, Intake)    Patient meets criteria for  Moderate (non-severe) Malnutrition  Pt meets criteria for non severe chronic  malnutriltion based on wasting.                Electronically signed by:  Ella Mcmanus RD  07/01/22 23:51 EDT

## 2022-07-02 NOTE — NURSING NOTE
Pt notified Rn she was attaching her insulin pump and verbalized understanding RN and CNA would check with her on what her dexacon glucometer blood sugar was reading and take a second blood sugar as needed and MDs dcd insulin coverage on MAR; Pt verbalized understanding

## 2022-07-02 NOTE — PROGRESS NOTES
"GI Daily Progress Note  Subjective:    Chief Complaint: Follow-up gastroparesis    Patient resting in bed no acute distress.  Notes she is feeling markedly improved today from yesterday.  Abdominal pain, nausea, and vomiting have resolved.  No further diarrhea.  Looking forward to discharge and following up with [facility] motility later this week.    Objective:    /59 (BP Location: Right arm, Patient Position: Lying)   Pulse 67   Temp 98.2 °F (36.8 °C) (Oral)   Resp 18   Ht 172.7 cm (68\")   Wt 54.6 kg (120 lb 6.4 oz)   SpO2 100%   BMI 18.31 kg/m²     Physical Exam  Vitals and nursing note reviewed.   Constitutional:       General: She is not in acute distress.     Appearance: Normal appearance. She is normal weight. She is not ill-appearing or toxic-appearing.   HENT:      Head: Normocephalic and atraumatic.   Eyes:      Extraocular Movements: Extraocular movements intact.      Conjunctiva/sclera: Conjunctivae normal.      Pupils: Pupils are equal, round, and reactive to light.   Cardiovascular:      Rate and Rhythm: Normal rate and regular rhythm.      Pulses: Normal pulses.      Heart sounds: Normal heart sounds.   Pulmonary:      Effort: Pulmonary effort is normal. No respiratory distress.      Breath sounds: Normal breath sounds.   Abdominal:      General: Abdomen is flat. Bowel sounds are normal. There is no distension.      Palpations: Abdomen is soft. There is no mass.      Tenderness: There is no abdominal tenderness. There is no guarding or rebound.      Hernia: No hernia is present.   Skin:     General: Skin is warm and dry.      Capillary Refill: Capillary refill takes less than 2 seconds.      Coloration: Skin is not jaundiced or pale.   Neurological:      General: No focal deficit present.      Mental Status: She is alert and oriented to person, place, and time.   Psychiatric:         Mood and Affect: Mood normal.         Behavior: Behavior normal.         Thought Content: Thought content " normal.         Judgment: Judgment normal.       Lab   I have personally reviewed most recent cardiac tracings, lab results and radiology images and interpretations and agree with findings.    Lab Results   Component Value Date    WBC 6.32 07/02/2022    HGB 10.9 (L) 07/02/2022    HGB 9.9 (L) 07/01/2022    HGB 10.8 (L) 06/30/2022    MCV 88.0 07/02/2022     07/02/2022    INR 0.95 05/20/2021    INR 1.10 10/31/2019    INR 0.9 10/31/2019    INR 1.01 08/05/2015    INR 1.04 08/17/2014       Lab Results   Component Value Date    GLUCOSE 173 (H) 07/02/2022    BUN 18 07/02/2022    CREATININE 1.23 (H) 07/02/2022    EGFRIFNONA 61 01/06/2015    EGFRIFAFRI 62 02/13/2022    BCR 14.6 07/02/2022     07/02/2022    K 3.6 07/02/2022    CO2 25.0 07/02/2022    CALCIUM 8.6 07/02/2022    PROTENTOTREF 7.6 01/06/2015    ALBUMIN 3.50 07/02/2022    ALKPHOS 98 06/29/2022    BILITOT 0.7 06/29/2022    ALT 8 06/29/2022    AST 12 06/29/2022       Assessment:      Elevated serum creatinine    Gastroesophageal reflux disease    Diabetic peripheral neuropathy (HCC)    Hyperlipidemia    Hypertension    Uncontrolled type 1 diabetes mellitus with hyperglycemia (HCC)    History of TIAs    Gastroparesis    Hypokalemia    Dehydration    Intractable nausea and vomiting    1. Gastroparesis, related to #4  2. Intractable nausea and vomiting,  related to above, improving  3. Diarrhea, resolved.     4. T1DM, better controlled than in prior interactions, much improvement   5.  History of C. Difficile colitis     Plan:  Thelma is doing well today-tolerating her diet with resolution in N/V/D, abdominal pain.  >>> Advance diet as tolerates; reiterated importance of gastroparesis diet and management strategies thereof.  >>> Continue IV Reglan throughout day; okay to position to p.o. tomorrow.  >>> Continue PPI    If tolerating diet with continued resolution of symptoms, dissipate discharge home tomorrow with outpatient follow-up in future.   Additionally, patient has appointment with motility clinic at [facility] on Tuesday, July 5 that is of the utmost importance for her to keep.    Terence White, HOWIE  07/02/22  13:53 EDT

## 2022-07-02 NOTE — PROGRESS NOTES
"                  Clinical Nutrition     Nutrition Assessment  Reason for Visit:   Identified at risk by screening criteria, BMI, Malnutrition Severity Assessment      Patient Name: Thelma Michael  YOB: 1960  MRN: [id number]  Date of Encounter: 07/01/22 23:43 EDT  Admission date: 6/29/2022      Comments:  Pt meets criteria for non severe chronic malnutriltion based on wasting. See flowsheet note.      Admission Diagnosis    Gastroparesis [K31.84]   N/V/D - w diarrhea now resolved N/V improved    Hospital Problem List    Elevated serum creatinine    Gastroesophageal reflux disease    Diabetic peripheral neuropathy (HCC)    Hyperlipidemia    Hypertension    Uncontrolled type 1 diabetes mellitus with hyperglycemia (HCC)    History of TIAs    Gastroparesis    Hypokalemia    Dehydration    Intractable nausea and vomiting      Other Applicable: Hx TIA's, quit Tob 4 da PTA    Applicable Interval History:      Applicable PMH/PSxH:     PMH: She  has a past medical history of Acid reflux, Acute bronchitis, Cardiac murmur, Diabetes mellitus ([facility]), H/O echocardiogram (08/07/2012), History of nuclear stress test (08/22/2014), Hyperlipidemia, Hypertension, Impacted cerumen of both ears, Migraine, Self-catheterizes urinary bladder, Sinusitis, Stroke ([facility]), Tobacco abuse, and Urticaria.   PSxH: She  has a past surgical history that includes No past surgeries; Dental surgery; Colonoscopy; Esophagogastroduodenoscopy (N/A, 6/30/2021); Colonoscopy (N/A, 7/27/2021); Esophagogastroduodenoscopy (N/A, 7/27/2021); Capsule Endoscopy (7/27/2021); and endoscopy with jtube (N/A, 3/16/2022).         Diet/Nutrition Related History:     Pt rpt will use suppl. Able to take some liquid now.       Anthropometrics     Admission Height 172.7 cm (68\") Documented at 06/29/2022 1123   Admission Weight 54.4 kg (120 lb) Documented at 06/29/2022 1123        Height: 172.7 cm (68\")    Last filed wt: Weight: 54.6 kg (120 lb 6.4 oz) " (07/01/22 2004)  Weight Method: Standing scale    BMI: BMI (Calculated): 18.3  Underweight:<18.5kg/m2      lbs      Weight Change   UBW: per  lbs in April  Weight change:6 lbs  ? amt fluid  % wt change: 5%  Time frame of weight loss: 2 1/2 mo     Labs reviewed   Yes  Low serum K+ replaced      Medications reviewed   Yes  FeSO4, Insulin, Protonix      Intake/Ouptut 24 hrs reviewed   Yes      Nutrition Focused Physical Exam     Pt meets criteria for non severe chronic malnutriltion based on wasting. See flowsheet note.    Current Nutrition Prescription     PO: Diet Full Liquid; Consistent Carbohydrate  Orders Placed This Encounter      Dietary Nutrition Supplements Boost Glucose Control; vanilla  3x/da added per RD    Intake: insuffic data 25 x 2 meals    Nutrition Diagnosis     7/1  Problem Malnutrition  non severe chronic   Etiology Effect gastroparesis   Signs/Symptoms Wasting   Status:      Goal:   General: Nutrition to support treatment  PO: Increase intake  Additional goals:      Nutrition Intervention     Follow treatment progress, Care plan reviewed, Advise alternate selection, Menu provided, Supplement provided      Monitoring/Evaluation:   Per protocol, PO intake, Supplement intake, Pertinent labs, Weight, GI status, Symptoms        Ella Mcmanus RD,   Time Spent: 30 min

## 2022-07-03 LAB
ANION GAP SERPL CALCULATED.3IONS-SCNC: 8 MMOL/L (ref 5–15)
BUN SERPL-MCNC: 30 MG/DL (ref 8–23)
BUN/CREAT SERPL: 15.3 (ref 7–25)
CALCIUM SPEC-SCNC: 9.1 MG/DL (ref 8.6–10.5)
CHLORIDE SERPL-SCNC: 104 MMOL/L (ref 98–107)
CO2 SERPL-SCNC: 26 MMOL/L (ref 22–29)
CREAT SERPL-MCNC: 1.96 MG/DL (ref 0.57–1)
EGFRCR SERPLBLD CKD-EPI 2021: 28.3 ML/MIN/1.73
GLUCOSE BLDC GLUCOMTR-MCNC: 118 MG/DL (ref 70–130)
GLUCOSE BLDC GLUCOMTR-MCNC: 166 MG/DL (ref 70–130)
GLUCOSE BLDC GLUCOMTR-MCNC: 392 MG/DL (ref 70–130)
GLUCOSE BLDC GLUCOMTR-MCNC: 80 MG/DL (ref 70–130)
GLUCOSE SERPL-MCNC: 118 MG/DL (ref 65–99)
POTASSIUM SERPL-SCNC: 3.8 MMOL/L (ref 3.5–5.2)
SODIUM SERPL-SCNC: 138 MMOL/L (ref 136–145)

## 2022-07-03 PROCEDURE — 99212 OFFICE O/P EST SF 10 MIN: CPT | Performed by: NURSE PRACTITIONER

## 2022-07-03 PROCEDURE — 25010000002 HEPARIN (PORCINE) PER 1000 UNITS: Performed by: INTERNAL MEDICINE

## 2022-07-03 PROCEDURE — 96372 THER/PROPH/DIAG INJ SC/IM: CPT

## 2022-07-03 PROCEDURE — 82962 GLUCOSE BLOOD TEST: CPT

## 2022-07-03 PROCEDURE — 80048 BASIC METABOLIC PNL TOTAL CA: CPT | Performed by: INTERNAL MEDICINE

## 2022-07-03 PROCEDURE — 99225 PR SBSQ OBSERVATION CARE/DAY 25 MINUTES: CPT | Performed by: INTERNAL MEDICINE

## 2022-07-03 PROCEDURE — G0378 HOSPITAL OBSERVATION PER HR: HCPCS

## 2022-07-03 RX ORDER — METOCLOPRAMIDE 10 MG/1
10 TABLET ORAL
Status: DISCONTINUED | OUTPATIENT
Start: 2022-07-03 | End: 2022-07-04 | Stop reason: HOSPADM

## 2022-07-03 RX ORDER — DEXTROSE, SODIUM CHLORIDE, AND POTASSIUM CHLORIDE 5; .45; .15 G/100ML; G/100ML; G/100ML
75 INJECTION INTRAVENOUS CONTINUOUS
Status: DISCONTINUED | OUTPATIENT
Start: 2022-07-03 | End: 2022-07-04 | Stop reason: HOSPADM

## 2022-07-03 RX ADMIN — POTASSIUM CHLORIDE, DEXTROSE MONOHYDRATE AND SODIUM CHLORIDE 75 ML/HR: 150; 5; 450 INJECTION, SOLUTION INTRAVENOUS at 10:12

## 2022-07-03 RX ADMIN — Medication 5 MG: at 21:44

## 2022-07-03 RX ADMIN — PANTOPRAZOLE SODIUM 40 MG: 40 TABLET, DELAYED RELEASE ORAL at 09:51

## 2022-07-03 RX ADMIN — POTASSIUM CHLORIDE, DEXTROSE MONOHYDRATE AND SODIUM CHLORIDE 75 ML/HR: 150; 5; 450 INJECTION, SOLUTION INTRAVENOUS at 23:41

## 2022-07-03 RX ADMIN — Medication: at 17:06

## 2022-07-03 RX ADMIN — ASPIRIN 81 MG: 81 TABLET, COATED ORAL at 09:51

## 2022-07-03 RX ADMIN — DONEPEZIL HYDROCHLORIDE 5 MG: 5 TABLET ORAL at 21:44

## 2022-07-03 RX ADMIN — METOCLOPRAMIDE 10 MG: 10 TABLET ORAL at 21:44

## 2022-07-03 RX ADMIN — TERAZOSIN HYDROCHLORIDE 1 MG: 1 CAPSULE ORAL at 21:44

## 2022-07-03 RX ADMIN — METOCLOPRAMIDE 10 MG: 10 TABLET ORAL at 09:51

## 2022-07-03 RX ADMIN — FERROUS SULFATE TAB 325 MG (65 MG ELEMENTAL FE) 325 MG: 325 (65 FE) TAB at 09:51

## 2022-07-03 RX ADMIN — METOCLOPRAMIDE 10 MG: 10 TABLET ORAL at 17:10

## 2022-07-03 RX ADMIN — HEPARIN SODIUM 5000 UNITS: 5000 INJECTION INTRAVENOUS; SUBCUTANEOUS at 21:44

## 2022-07-03 RX ADMIN — Medication 10 ML: at 09:51

## 2022-07-03 RX ADMIN — GABAPENTIN 400 MG: 400 CAPSULE ORAL at 21:44

## 2022-07-03 RX ADMIN — HEPARIN SODIUM 5000 UNITS: 5000 INJECTION INTRAVENOUS; SUBCUTANEOUS at 14:30

## 2022-07-03 RX ADMIN — ATORVASTATIN CALCIUM 80 MG: 40 TABLET, FILM COATED ORAL at 21:44

## 2022-07-03 RX ADMIN — Medication 10 ML: at 21:44

## 2022-07-03 RX ADMIN — FLUOXETINE 20 MG: 20 CAPSULE ORAL at 09:51

## 2022-07-03 RX ADMIN — HEPARIN SODIUM 5000 UNITS: 5000 INJECTION INTRAVENOUS; SUBCUTANEOUS at 05:17

## 2022-07-03 RX ADMIN — AMLODIPINE BESYLATE 10 MG: 10 TABLET ORAL at 09:51

## 2022-07-03 NOTE — PROGRESS NOTES
[facility] Medicine Services  PROGRESS NOTE    Patient Name: Thelma Michael  : 1960  MRN: [id number]    Date of Admission: 2022  Primary Care Physician: Monet Howe APRN    Subjective   Subjective     CC:  Gastroparesis    HPI:  Had some low blood sugars again this morning. Talked with nurse Cr about this. I talked with the patient as well. She sees endocrinology and has an insulin pump. She takes 20 units of insulin every 3 days. She reports recurrent episodes of gastroparesis every few months. She has her pump running now. I told her that her kidney function was not as good today. She was a bit concerned there, but I reassured her that it should get better with some fluids and she was ok with that.    ROS:  Gen- No fevers, chills  CV- No chest pain, palpitations  Resp- No cough, dyspnea  GI- No N/V/D, + abd pain              Objective   Objective     Vital Signs:   Temp:  [97 °F (36.1 °C)-98.3 °F (36.8 °C)] 97 °F (36.1 °C)  Heart Rate:  [67-90] 70  Resp:  [16-18] 18  BP: (104-160)/(59-81) 140/67     Physical Exam:  Constitutional: No acute distress, awake, alert  Respiratory: Clear to auscultation bilaterally, respiratory effort normal   Cardiovascular: RRR, no murmurs, rubs, or gallops  Gastrointestinal: Positive bowel sounds, soft, nontender, nondistended  Musculoskeletal: No bilateral ankle edema  Psychiatric: Appropriate affect, cooperative  Neurologic: Oriented x 3, speech clear      Results Reviewed:  LAB RESULTS:      Lab 22  0522 22  0426 22  0943 22  1145   WBC 6.32 5.38 7.57  --  7.72   HEMOGLOBIN 10.9* 9.9* 10.8*  --  13.8   HEMATOCRIT 32.4* 28.8* 31.1*  --  38.7   PLATELETS 247 244 283  --  383   NEUTROS ABS 3.61 2.78  --   --  5.74   IMMATURE GRANS (ABS) 0.02 0.02  --   --  0.02   LYMPHS ABS 2.26 2.10  --   --  1.51   MONOS ABS 0.41 0.44  --   --  0.44   EOS ABS 0.02 0.04  --   --  0.00   MCV 88.0  87.5 85.7  --  84.7   PROCALCITONIN  --   --   --  0.14  --    LACTATE  --   --   --  1.0  --          Lab 07/03/22  0456 07/02/22 0522 07/01/22  1154 07/01/22 0426 06/30/22  2346 06/30/22  0943 06/29/22  1145   SODIUM 138 140  --  139  --  139 138   POTASSIUM 3.8 3.6 4.0 3.5 3.4* 3.3* 2.9*   CHLORIDE 104 105  --  108*  --  104 95*   CO2 26.0 25.0  --  22.0  --  26.0 24.0   ANION GAP 8.0 10.0  --  9.0  --  9.0 19.0*   BUN 30* 18  --  33*  --  55* 59*   CREATININE 1.96* 1.23*  --  1.39*  --  2.00* 2.27*   EGFR 28.3* 49.5*  --  42.7*  --  27.6* 23.7*   GLUCOSE 118* 173*  --  44*  --  102* 327*   CALCIUM 9.1 8.6  --  8.4*  --  8.6 9.6   MAGNESIUM  --   --   --   --   --   --  2.0   PHOSPHORUS  --  3.1  --  2.3*  --   --   --    HEMOGLOBIN A1C  --   --   --   --   --  6.70*  --    TSH  --   --   --   --   --  0.362  --          Lab 07/02/22 0522 07/01/22 0426 06/29/22  2002 06/29/22  1145   TOTAL PROTEIN  --   --   --  7.5   ALBUMIN 3.50 3.20*  --  4.40   GLOBULIN  --   --   --  3.1   ALT (SGPT)  --   --   --  8   AST (SGOT)  --   --   --  12   BILIRUBIN  --   --   --  0.7   ALK PHOS  --   --   --  98   LIPASE  --   --  25  --          Lab 06/29/22  1145   TROPONIN T 0.015         Lab 06/30/22  0943   CHOLESTEROL 218*   LDL CHOL 146*   HDL CHOL 51   TRIGLYCERIDES 115             Lab 06/29/22  1607   PH, ARTERIAL 7.450   PCO2, ARTERIAL 38.9   PO2 ART 83.4   FIO2 21   HCO3 ART 27.0*   BASE EXCESS ART 2.9*   CARBOXYHEMOGLOBIN 1.0     Brief Urine Lab Results  (Last result in the past 365 days)      Color   Clarity   Blood   Leuk Est   Nitrite   Protein   CREAT   Urine HCG        06/29/22 1319 Yellow   Clear   Trace   Negative   Negative   >=300 mg/dL (3+)                 Microbiology Results Abnormal     Procedure Component Value - Date/Time    COVID PRE-OP / PRE-PROCEDURE SCREENING ORDER (NO ISOLATION) - Swab, Nasopharynx [[id number]]  (Normal) Collected: 06/29/22 1336    Lab Status: Final result Specimen: Swab from  Nasopharynx Updated: 06/29/22 1448    Narrative:      The following orders were created for panel order COVID PRE-OP / PRE-PROCEDURE SCREENING ORDER (NO ISOLATION) - Swab, Nasopharynx.  Procedure                               Abnormality         Status                     ---------                               -----------         ------                     COVID-19 and FLU A/B PCR...[[id number]]  Normal              Final result                 Please view results for these tests on the individual orders.    COVID-19 and FLU A/B PCR - Swab, Nasopharynx [[id number]]  (Normal) Collected: 06/29/22 1336    Lab Status: Final result Specimen: Swab from Nasopharynx Updated: 06/29/22 1448     COVID19 Not Detected     Influenza A PCR Not Detected     Influenza B PCR Not Detected    Narrative:      Fact sheet for providers: https://www.fda.gov/media/783035/download    Fact sheet for patients: https://www.fda.gov/media/342745/download    Test performed by PCR.        I have reviewed the medications:  Scheduled Meds:amLODIPine, 10 mg, Oral, Daily  aspirin, 81 mg, Oral, Daily  atorvastatin, 80 mg, Oral, Nightly  donepezil, 5 mg, Oral, Nightly  ferrous sulfate, 325 mg, Oral, Daily With Breakfast  FLUoxetine, 20 mg, Oral, Daily  heparin (porcine), 5,000 Units, Subcutaneous, Q8H  metoclopramide, 10 mg, Oral, 4x Daily AC & at Bedtime  pantoprazole, 40 mg, Oral, Daily  potassium chloride, 10 mEq, Intravenous, Once   And  potassium chloride, 10 mEq, Intravenous, Once   And  potassium chloride, 10 mEq, Intravenous, Once  sodium chloride, 10 mL, Intravenous, Q12H  terazosin, 1 mg, Oral, Nightly      Continuous Infusions:   PRN Meds:.•  acetaminophen **OR** acetaminophen **OR** acetaminophen  •  albuterol sulfate HFA  •  aluminum-magnesium hydroxide-simethicone  •  calcium carbonate  •  dextrose  •  dextrose  •  gabapentin  •  glucagon (human recombinant)  •  magnesium sulfate **OR** magnesium sulfate **OR** magnesium sulfate  •   melatonin  •  ondansetron **OR** ondansetron  •  potassium chloride **OR** potassium chloride **OR** potassium chloride  •  potassium phosphate infusion greater than 15 mMoles **OR** potassium phosphate **OR** potassium phosphate  •  sodium chloride  •  sodium chloride  •  traMADol  •  traZODone    Assessment & Plan   Assessment & Plan     Active Hospital Problems    Diagnosis  POA   • **Elevated serum creatinine [R79.89]  Yes   • Intractable nausea and vomiting [R11.2]  Yes   • Dehydration [E86.0]  Yes   • Hypokalemia [E87.6]  Yes   • Gastroparesis [K31.84]  Yes   • History of TIAs [Z86.73]  Not Applicable   • Uncontrolled type 1 diabetes mellitus with hyperglycemia (HCC) [E10.65]  Yes   • Diabetic peripheral neuropathy (HCC) [E11.42]  Yes   • Hypertension [I10]  Yes   • Hyperlipidemia [E78.5]  Yes   • Gastroesophageal reflux disease [K21.9]  Yes      Resolved Hospital Problems   No resolved problems to display.        Brief Hospital Course to date:  Thelma Michael is a 61 y.o. female with history of type 1 diabetes, gastroparesis and recent C. difficile in March 2022 for nausea vomiting diarrhea.  Diarrhea has resolved.  But she is still having intermittent abdominal pain.     Gastroparesis  N/V/D and abdominal pain  H/o recent C diff  -PO still less than baseline. Will give fluids.     RAFAT  -- Renal function worse today, still not back to baseline. Holding HCTZ. Encouraging PO intake. Will start IV fluids.     Essential hypertension  -continue amlodipine, HCTZ on hold now     Diabetes mellitus 1  -Has insulin pump, still with hypoglycemia with subcutaneous insulin. Will transition back to the pump now and dc subcutaneous insulin.  -- A1c 6.7     Hypokalemia  Hypomagnesemia  -Electrolyte replacement  -Continue to monitor        Hyperlipidemia  -continue statin      Expected Discharge Location and Transportation: Home  Expected Discharge Date: tomorrow    DVT prophylaxis:  Medical DVT prophylaxis orders are  present.     AM-PAC 6 Clicks Score (PT): 21 (07/02/22 0800)    CODE STATUS:   Code Status and Medical Interventions:   Ordered at: 06/29/22 5488     Level Of Support Discussed With:    Patient     Code Status (Patient has no pulse and is not breathing):    CPR (Attempt to Resuscitate)     Medical Interventions (Patient has pulse or is breathing):    Full Support       Jose Joy DO  07/03/22

## 2022-07-03 NOTE — PROGRESS NOTES
"GI Daily Progress Note  Subjective:    Chief Complaint: Follow-up gastroparesis    Thelma is feeling improved today.  Tolerating diet.  One episode of nausea this morning but has since resolved.  No further diarrhea.  No new or worsening complaints.  Denies pain.    Objective:    /82 (BP Location: Left arm, Patient Position: Lying)   Pulse 79   Temp 97.2 °F (36.2 °C) (Axillary)   Resp 18   Ht 172.7 cm (68\")   Wt 54.6 kg (120 lb 6.4 oz)   SpO2 100%   BMI 18.31 kg/m²     Physical Exam  Vitals and nursing note reviewed.   Constitutional:       General: She is not in acute distress.     Appearance: Normal appearance. She is normal weight. She is not ill-appearing or toxic-appearing.   HENT:      Head: Normocephalic and atraumatic.   Eyes:      General: No scleral icterus.     Extraocular Movements: Extraocular movements intact.      Conjunctiva/sclera: Conjunctivae normal.      Pupils: Pupils are equal, round, and reactive to light.   Cardiovascular:      Rate and Rhythm: Normal rate and regular rhythm.      Pulses: Normal pulses.      Heart sounds: Normal heart sounds.   Pulmonary:      Effort: Pulmonary effort is normal. No respiratory distress.      Breath sounds: Normal breath sounds.   Abdominal:      General: Abdomen is flat. Bowel sounds are normal. There is no distension.      Palpations: Abdomen is soft. There is no mass.      Tenderness: There is no abdominal tenderness. There is no guarding or rebound.      Hernia: No hernia is present.   Skin:     General: Skin is warm and dry.      Capillary Refill: Capillary refill takes less than 2 seconds.      Coloration: Skin is not jaundiced or pale.   Neurological:      General: No focal deficit present.      Mental Status: She is alert and oriented to person, place, and time.   Psychiatric:         Mood and Affect: Mood normal.         Behavior: Behavior normal.         Thought Content: Thought content normal.         Judgment: Judgment normal. "         Lab  I have personally reviewed most recent cardiac tracings, lab results and radiology images and interpretations and agree with findings.    Lab Results   Component Value Date    WBC 6.32 07/02/2022    HGB 10.9 (L) 07/02/2022    HGB 9.9 (L) 07/01/2022    HGB 10.8 (L) 06/30/2022    MCV 88.0 07/02/2022     07/02/2022    INR 0.95 05/20/2021    INR 1.10 10/31/2019    INR 0.9 10/31/2019    INR 1.01 08/05/2015    INR 1.04 08/17/2014       Lab Results   Component Value Date    GLUCOSE 118 (H) 07/03/2022    BUN 30 (H) 07/03/2022    CREATININE 1.96 (H) 07/03/2022    EGFRIFNONA 61 01/06/2015    EGFRIFAFRI 62 02/13/2022    BCR 15.3 07/03/2022     07/03/2022    K 3.8 07/03/2022    CO2 26.0 07/03/2022    CALCIUM 9.1 07/03/2022    PROTENTOTREF 7.6 01/06/2015    ALBUMIN 3.50 07/02/2022    ALKPHOS 98 06/29/2022    BILITOT 0.7 06/29/2022    ALT 8 06/29/2022    AST 12 06/29/2022     Assessment:      Elevated serum creatinine    Gastroesophageal reflux disease    Diabetic peripheral neuropathy (HCC)    Hyperlipidemia    Hypertension    Uncontrolled type 1 diabetes mellitus with hyperglycemia (HCC)    History of TIAs    Gastroparesis    Hypokalemia    Dehydration    Intractable nausea and vomiting    1. Gastroparesis, related to #4  2. Intractable nausea and vomiting,  related to above, improving  3. Diarrhea, resolved.     4. T1DM, better controlled than in prior interactions, much improvement   5.  History of C. Difficile colitis     Plan:  Patient is feeling well today; nausea and diarrhea have resolved.  Tolerating diet.  >>> Continue to [place] diet as tolerated.   -Follow gastroparesis diet.  >>> We will discontinue IV Reglan and begin p.o; will need short course of p.o. at discharge to tide her over until motility appointment.  >>> Continue PPI.  >>> Continue as needed antiemetics.    Okay for discharge from GI standpoint, when medically appropriate per hospital medicine in regard to resumption of insulin  pump.  Reiterated the importance that she makes it to the motility appointment at [facility] on Tuesday, July 5 as it has been months in the making.  Patient voices understanding of this.    Terence White, HOWIE  07/03/22  11:36 EDT

## 2022-07-03 NOTE — PLAN OF CARE
Goal Outcome Evaluation:  Plan of Care Reviewed With: patient        Progress: improving  Outcome Evaluation: VSS; NSR on monitor; blood sugar dropped into 60's glucose gel given; monitored BS 97/115/157/193/ ;denied pain or nausea; appears comfortable at present; possible discharge this AM; call light within her reach; will continue to monitor

## 2022-07-04 ENCOUNTER — READMISSION MANAGEMENT (OUTPATIENT)
Dept: CALL CENTER | Facility: HOSPITAL | Age: 62
End: 2022-07-04

## 2022-07-04 VITALS
HEART RATE: 85 BPM | WEIGHT: 120.4 LBS | SYSTOLIC BLOOD PRESSURE: 140 MMHG | OXYGEN SATURATION: 100 % | TEMPERATURE: 98.1 F | RESPIRATION RATE: 18 BRPM | HEIGHT: 68 IN | DIASTOLIC BLOOD PRESSURE: 75 MMHG | BODY MASS INDEX: 18.25 KG/M2

## 2022-07-04 PROBLEM — E87.6 HYPOKALEMIA: Status: RESOLVED | Noted: 2022-02-11 | Resolved: 2022-07-04

## 2022-07-04 PROBLEM — R19.7 NAUSEA VOMITING AND DIARRHEA: Status: RESOLVED | Noted: 2022-02-11 | Resolved: 2022-07-04

## 2022-07-04 PROBLEM — R11.10 NON-INTRACTABLE VOMITING: Status: RESOLVED | Noted: 2021-06-27 | Resolved: 2022-07-04

## 2022-07-04 PROBLEM — E87.6 HYPOKALEMIA: Status: RESOLVED | Noted: 2022-01-08 | Resolved: 2022-07-04

## 2022-07-04 PROBLEM — R11.2 NAUSEA VOMITING AND DIARRHEA: Status: RESOLVED | Noted: 2022-02-11 | Resolved: 2022-07-04

## 2022-07-04 PROBLEM — I16.0 HYPERTENSIVE URGENCY: Status: RESOLVED | Noted: 2021-05-01 | Resolved: 2022-07-04

## 2022-07-04 PROBLEM — E86.0 DEHYDRATION: Status: RESOLVED | Noted: 2022-02-11 | Resolved: 2022-07-04

## 2022-07-04 PROBLEM — R11.2 INTRACTABLE NAUSEA AND VOMITING: Status: RESOLVED | Noted: 2022-03-26 | Resolved: 2022-07-04

## 2022-07-04 LAB
ANION GAP SERPL CALCULATED.3IONS-SCNC: 8 MMOL/L (ref 5–15)
BUN SERPL-MCNC: 20 MG/DL (ref 8–23)
BUN/CREAT SERPL: 16.4 (ref 7–25)
CALCIUM SPEC-SCNC: 8.6 MG/DL (ref 8.6–10.5)
CHLORIDE SERPL-SCNC: 105 MMOL/L (ref 98–107)
CO2 SERPL-SCNC: 24 MMOL/L (ref 22–29)
CREAT SERPL-MCNC: 1.22 MG/DL (ref 0.57–1)
EGFRCR SERPLBLD CKD-EPI 2021: 50 ML/MIN/1.73
GLUCOSE BLDC GLUCOMTR-MCNC: 105 MG/DL (ref 70–130)
GLUCOSE BLDC GLUCOMTR-MCNC: 123 MG/DL (ref 70–130)
GLUCOSE BLDC GLUCOMTR-MCNC: 185 MG/DL (ref 70–130)
GLUCOSE SERPL-MCNC: 131 MG/DL (ref 65–99)
POTASSIUM SERPL-SCNC: 4 MMOL/L (ref 3.5–5.2)
SODIUM SERPL-SCNC: 137 MMOL/L (ref 136–145)

## 2022-07-04 PROCEDURE — 25010000002 HEPARIN (PORCINE) PER 1000 UNITS: Performed by: INTERNAL MEDICINE

## 2022-07-04 PROCEDURE — 80048 BASIC METABOLIC PNL TOTAL CA: CPT | Performed by: INTERNAL MEDICINE

## 2022-07-04 PROCEDURE — 96372 THER/PROPH/DIAG INJ SC/IM: CPT

## 2022-07-04 PROCEDURE — 99217 PR OBSERVATION CARE DISCHARGE MANAGEMENT: CPT | Performed by: INTERNAL MEDICINE

## 2022-07-04 PROCEDURE — G0378 HOSPITAL OBSERVATION PER HR: HCPCS

## 2022-07-04 PROCEDURE — 82962 GLUCOSE BLOOD TEST: CPT

## 2022-07-04 RX ORDER — METOCLOPRAMIDE 10 MG/1
10 TABLET ORAL
Qty: 120 TABLET | Refills: 2 | Status: SHIPPED | OUTPATIENT
Start: 2022-07-04 | End: 2022-12-12 | Stop reason: HOSPADM

## 2022-07-04 RX ADMIN — HEPARIN SODIUM 5000 UNITS: 5000 INJECTION INTRAVENOUS; SUBCUTANEOUS at 13:47

## 2022-07-04 RX ADMIN — FERROUS SULFATE TAB 325 MG (65 MG ELEMENTAL FE) 325 MG: 325 (65 FE) TAB at 08:25

## 2022-07-04 RX ADMIN — METOCLOPRAMIDE 10 MG: 10 TABLET ORAL at 12:10

## 2022-07-04 RX ADMIN — POTASSIUM CHLORIDE, DEXTROSE MONOHYDRATE AND SODIUM CHLORIDE 75 ML/HR: 150; 5; 450 INJECTION, SOLUTION INTRAVENOUS at 13:47

## 2022-07-04 RX ADMIN — TRAMADOL HYDROCHLORIDE 50 MG: 50 TABLET, COATED ORAL at 13:45

## 2022-07-04 RX ADMIN — PANTOPRAZOLE SODIUM 40 MG: 40 TABLET, DELAYED RELEASE ORAL at 08:25

## 2022-07-04 RX ADMIN — HEPARIN SODIUM 5000 UNITS: 5000 INJECTION INTRAVENOUS; SUBCUTANEOUS at 05:08

## 2022-07-04 RX ADMIN — AMLODIPINE BESYLATE 10 MG: 10 TABLET ORAL at 08:25

## 2022-07-04 RX ADMIN — METOCLOPRAMIDE 10 MG: 10 TABLET ORAL at 08:25

## 2022-07-04 RX ADMIN — ASPIRIN 81 MG: 81 TABLET, COATED ORAL at 08:25

## 2022-07-04 RX ADMIN — Medication 10 ML: at 08:25

## 2022-07-04 RX ADMIN — FLUOXETINE 20 MG: 20 CAPSULE ORAL at 08:25

## 2022-07-04 RX ADMIN — METOCLOPRAMIDE 10 MG: 10 TABLET ORAL at 17:12

## 2022-07-04 RX ADMIN — TRAMADOL HYDROCHLORIDE 50 MG: 50 TABLET, COATED ORAL at 04:12

## 2022-07-04 NOTE — DISCHARGE SUMMARY
[facility] Medicine Services  DISCHARGE SUMMARY    Patient Name: Thelma Michael  : 1960  MRN: [id number]    Date of Admission: 2022 11:21 AM  Date of Discharge:  22  Primary Care Physician: Monet Howe APRN    Consults     Date and Time Order Name Status Description    2022 12:34 AM Inpatient Gastroenterology Consult Completed           Hospital Course     Presenting Problem:   Gastroparesis [K31.84]    Active Hospital Problems    Diagnosis  POA   • **Elevated serum creatinine [R79.89]  Yes   • Gastroparesis [K31.84]  Yes   • History of TIAs [Z86.73]  Not Applicable   • Uncontrolled type 1 diabetes mellitus with hyperglycemia (HCC) [E10.65]  Yes   • Diabetic peripheral neuropathy (HCC) [E11.42]  Yes   • Hypertension [I10]  Yes   • Hyperlipidemia [E78.5]  Yes   • Gastroesophageal reflux disease [K21.9]  Yes      Resolved Hospital Problems    Diagnosis Date Resolved POA   • Intractable nausea and vomiting [R11.2] 2022 Yes   • Dehydration [E86.0] 2022 Yes   • Hypokalemia [E87.6] 2022 Yes          Hospital Course:  Thelma Michael is a 61 y.o. female with gastroparesis and type 1 diabetes. She had numerous electrolyte derangements and dehydration upon admission. She has a follow up that is pending tomorrow with a gastroparesis specialist in [place] which GI has urged that she make it to otherwise I might keep her and give some more IV fluids. Currently, Ms Jeter creatinine has improved from 1.9 to 1.2. Her baseline is less than 1. She had episodes of hypoglycemia while inpatient and is VERY SENSITIVE to insulin. She only uses 20 units every 3 days through her pump, so if you are readmitting her please be mindful of this. Her PO has improved and she is cleared for DC by GI. They have prescribed her oral Reglan. She also has intranasal Reglan.      Discharge Follow Up Recommendations for outpatient labs/diagnostics:   She is  to have her BMP checked tomorrow I will provide her with a prescription for this. I asked her to get this checked at her gastroparesis specialist and if her Creatinine is worsening again she may need rehospitalization.    Day of Discharge     HPI:   Ms. Michael did well overnight. She did not have hypoglycemia on the pump. She is ambulating. Her ability to eat continues to improve eating 1/2 a sandwich last night.    Review of Systems  Please see HPI.    Vital Signs:   Temp:  [96.7 °F (35.9 °C)-98.8 °F (37.1 °C)] 96.7 °F (35.9 °C)  Heart Rate:  [72-83] 83  Resp:  [16-18] 18  BP: (136-162)/() 145/110      Physical Exam:  Constitutional: No acute distress, awake, alert  Respiratory: Clear to auscultation bilaterally, respiratory effort normal   Cardiovascular: RRR, no murmurs, rubs, or gallops  Gastrointestinal: Positive bowel sounds, soft, nontender, nondistended  Musculoskeletal: No bilateral ankle edema  Psychiatric: Appropriate affect, cooperative  Neurologic: Oriented x 3, strength symmetric in all extremities,  speech clear        Pertinent  and/or Most Recent Results     LAB RESULTS:      Lab 07/02/22  0522 07/01/22  0426 06/30/22  0943 06/29/22 2002 06/29/22  1145   WBC 6.32 5.38 7.57  --  7.72   HEMOGLOBIN 10.9* 9.9* 10.8*  --  13.8   HEMATOCRIT 32.4* 28.8* 31.1*  --  38.7   PLATELETS 247 244 283  --  383   NEUTROS ABS 3.61 2.78  --   --  5.74   IMMATURE GRANS (ABS) 0.02 0.02  --   --  0.02   LYMPHS ABS 2.26 2.10  --   --  1.51   MONOS ABS 0.41 0.44  --   --  0.44   EOS ABS 0.02 0.04  --   --  0.00   MCV 88.0 87.5 85.7  --  84.7   PROCALCITONIN  --   --   --  0.14  --    LACTATE  --   --   --  1.0  --          Lab 07/04/22  0450 07/03/22  0456 07/02/22  0522 07/01/22  1154 07/01/22  0426 06/30/22  2346 06/30/22  0943 06/29/22  1145   SODIUM 137 138 140  --  139  --  139 138   POTASSIUM 4.0 3.8 3.6 4.0 3.5   < > 3.3* 2.9*   CHLORIDE 105 104 105  --  108*  --  104 95*   CO2 24.0 26.0 25.0  --  22.0  --   26.0 24.0   ANION GAP 8.0 8.0 10.0  --  9.0  --  9.0 19.0*   BUN 20 30* 18  --  33*  --  55* 59*   CREATININE 1.22* 1.96* 1.23*  --  1.39*  --  2.00* 2.27*   EGFR 50.0* 28.3* 49.5*  --  42.7*  --  27.6* 23.7*   GLUCOSE 131* 118* 173*  --  44*  --  102* 327*   CALCIUM 8.6 9.1 8.6  --  8.4*  --  8.6 9.6   MAGNESIUM  --   --   --   --   --   --   --  2.0   PHOSPHORUS  --   --  3.1  --  2.3*  --   --   --    HEMOGLOBIN A1C  --   --   --   --   --   --  6.70*  --    TSH  --   --   --   --   --   --  0.362  --     < > = values in this interval not displayed.         Lab 07/02/22  0522 07/01/22  0426 06/29/22 2002 06/29/22  1145   TOTAL PROTEIN  --   --   --  7.5   ALBUMIN 3.50 3.20*  --  4.40   GLOBULIN  --   --   --  3.1   ALT (SGPT)  --   --   --  8   AST (SGOT)  --   --   --  12   BILIRUBIN  --   --   --  0.7   ALK PHOS  --   --   --  98   LIPASE  --   --  25  --          Lab 06/29/22  1145   TROPONIN T 0.015         Lab 06/30/22  0943   CHOLESTEROL 218*   LDL CHOL 146*   HDL CHOL 51   TRIGLYCERIDES 115             Lab 06/29/22  1607   PH, ARTERIAL 7.450   PCO2, ARTERIAL 38.9   PO2 ART 83.4   FIO2 21   HCO3 ART 27.0*   BASE EXCESS ART 2.9*   CARBOXYHEMOGLOBIN 1.0     Brief Urine Lab Results  (Last result in the past 365 days)      Color   Clarity   Blood   Leuk Est   Nitrite   Protein   CREAT   Urine HCG        06/29/22 1319 Yellow   Clear   Trace   Negative   Negative   >=300 mg/dL (3+)               Microbiology Results (last 10 days)     Procedure Component Value - Date/Time    COVID PRE-OP / PRE-PROCEDURE SCREENING ORDER (NO ISOLATION) - Swab, Nasopharynx [[id number]]  (Normal) Collected: 06/29/22 1336    Lab Status: Final result Specimen: Swab from Nasopharynx Updated: 06/29/22 9032    Narrative:      The following orders were created for panel order COVID PRE-OP / PRE-PROCEDURE SCREENING ORDER (NO ISOLATION) - Swab, Nasopharynx.  Procedure                               Abnormality         Status                      ---------                               -----------         ------                     COVID-19 and FLU A/B PCR...[[id number]]  Normal              Final result                 Please view results for these tests on the individual orders.    COVID-19 and FLU A/B PCR - Swab, Nasopharynx [[id number]]  (Normal) Collected: 06/29/22 1336    Lab Status: Final result Specimen: Swab from Nasopharynx Updated: 06/29/22 1448     COVID19 Not Detected     Influenza A PCR Not Detected     Influenza B PCR Not Detected    Narrative:      Fact sheet for providers: https://www.fda.gov/media/848154/download    Fact sheet for patients: https://www.fda.gov/media/482882/download    Test performed by PCR.          CT Abdomen Pelvis Without Contrast    Result Date: 6/29/2022  EXAM: CT ABDOMEN PELVIS WO CONTRAST-  DATE OF EXAM: 6/29/2022 2:10 PM  INDICATION: ABDOMINAL PAIN/NAUSEA/VOMITING/HISTORY OF GASTROPARESIS.  COMPARISON: CT abdomen and pelvis dated 3/26/2022  TECHNIQUE: Contiguous axial CT images were obtained from the lung bases to the pubic symphysis without contrast. Sagittal and coronal reconstructions were performed.  Automated exposure control and iterative reconstruction methods were used.  FINDINGS: The lack of intravenous contrast limits the evaluation of visceral and vascular structures. The visualized lung bases appear clear.  The liver is normal in size and contour. There is no focal hepatic lesion by noncontrast technique. The gallbladder is present without wall thickening. There is no intrahepatic or extra hepatic biliary ductal dilatation. The spleen is normal in size. There are bilateral low-density adrenal nodules with density compatible with benign lipid rich adrenal adenomas. The pancreas appears unremarkable for noncontrast technique.  The kidneys are symmetric in size. There is no hydronephrosis or urolithiasis. There is nonspecific circumferential bladder wall thickening. This appears similar to the prior  examination. This could relate to cystitis in the correct clinical setting. The uterus is present and anteverted. There are no adnexal masses.  The stomach and duodenum are normal in caliber and configuration. There are no abnormally dilated loops of small bowel to suggest small bowel obstruction or small bowel inflammation. The appendix is well-visualized and normal within the right lower quadrant. There is no evidence of acute colitis or diverticulitis. There is no free fluid or free air.  The aorta is normal in caliber without evidence of aneurysm formation. There is no mesenteric, retroperitoneal, or pelvic lymphadenopathy. There is degenerative disc disease at L5-S1.      1. No acute intra-abdominal or pelvic abnormality. No evidence of gastric distention or gastric outlet obstruction. 2. Nonspecific circumferential bladder wall thickening. This can be seen with cystitis in the correct clinical setting. Correlate clinically with urinalysis. 3. Bilateral low-density lipid rich adrenal adenomas.   This report was finalized on 6/29/2022 2:52 PM by Rakesh Azul MD.      XR Chest 1 View    Result Date: 6/29/2022  EXAMINATION: XR CHEST 1 VW-  DATE OF EXAM: 6/29/2022 11:46 AM  INDICATION: Weak/Dizzy/AMS triage protocol.  Epigastric pain  COMPARISON: Chest radiograph dated 3/26/2022  TECHNIQUE: Portable AP view of the chest was obtained.  FINDINGS: The cardiomediastinal silhouette is within normal limits. Pulmonary vascularity appears normal. There is no focal airspace consolidation, pleural effusion, or pneumothorax. There are degenerative changes of the thoracic spine. There is no free air under the diaphragm.      No acute cardiopulmonary abnormality.  This report was finalized on 6/29/2022 12:06 PM by Rakesh Azul MD.                Results for orders placed during the hospital encounter of 11/19/19    Adult Transesophageal Echo (LIZANDRO) W/ Cont if Necessary Per Protocol    Interpretation Summary  · Left  ventricular systolic function is normal. Estimated EF = 65%.  · Left ventricular wall thickness is consistent with hypertrophy.  · Small patent foramen ovale present. Saline test results are positive with valsalva manuever.  · There is mild mitral valve prolapse of the anterior mitral leaflet.  · Mild tricuspid valve regurgitation is present.  · Estimated right ventricular systolic pressure from tricuspid regurgitation is mildly elevated (35-45 mmHg).  · There is mild plaque in the descending aorta present.      Plan for Follow-up of Pending Labs/Results: will check her BMP at gastroparesis office tomorrow.    Discharge Details        Discharge Medications      Changes to Medications      Instructions Start Date   Gimoti 15 MG/ACT solution  Generic drug: Metoclopramide HCl  What changed: Another medication with the same name was added. Make sure you understand how and when to take each.   No dose, route, or frequency recorded.      metoclopramide 10 MG tablet  Commonly known as: REGLAN  What changed: You were already taking a medication with the same name, and this prescription was added. Make sure you understand how and when to take each.   10 mg, Oral, 4 Times Daily Before Meals & Nightly         Continue These Medications      Instructions Start Date   acetaminophen 325 MG tablet  Commonly known as: TYLENOL   650 mg, Oral, Every 4 Hours PRN      albuterol sulfate  (90 Base) MCG/ACT inhaler  Commonly known as: PROVENTIL HFA;VENTOLIN HFA;PROAIR HFA   2 puffs, Inhalation, Every 4 Hours PRN      amLODIPine 10 MG tablet  Commonly known as: NORVASC   10 mg, Oral, Daily      atorvastatin 80 MG tablet  Commonly known as: LIPITOR   80 mg, Oral, Nightly      BD Pen Needle Cydney U/F 32G X 4 MM misc  Generic drug: Insulin Pen Needle   1 each, Oral, 4 Times Daily      calcium carbonate 500 MG chewable tablet  Commonly known as: TUMS   2 tablets, Oral, 3 Times Daily PRN      Dexcom G6  device   1 kit, Does not  apply, Every 3 Months      Dexcom G6 Sensor   Does not apply, Every 10 Days      Dexcom G6 Transmitter misc   1 kit, Does not apply, Every 3 Months      donepezil 5 MG tablet  Commonly known as: Aricept   5 mg, Oral, Nightly      doxazosin 1 MG tablet  Commonly known as: CARDURA   1 mg, Oral, Nightly      estradiol 0.1 MG/GM vaginal cream  Commonly known as: ESTRACE VAGINAL   Insert 1 gm intravaginally twice weekly at bedtime.      ferrous sulfate 325 (65 FE) MG tablet   325 mg, Oral, Daily With Breakfast, Take with orange juice or vitamin C      FLUoxetine 20 MG capsule  Commonly known as: PROzac   20 mg, Oral, Daily      FreeStyle Lite w/Device kit   USE UNIT TO CHECK GLUCOSE 4 TIMES DAILY      gabapentin 400 MG capsule  Commonly known as: NEURONTIN   400 mg, Oral, 2 Times Daily PRN      glucose blood test strip  Commonly known as: Glucose Meter Test   Use as instructed to check blood glucose levels 3 times daily      glucose blood test strip   Use as instructed      glucose monitor monitoring kit   1 each, Does not apply, 4 Times Daily      melatonin 5 MG tablet tablet   5 mg, Oral, Nightly PRN      Multivitamin-Minerals tablet   1 tablet, Oral, Daily      pantoprazole 40 MG EC tablet  Commonly known as: PROTONIX   40 mg, Oral, Daily      sennosides-docusate 8.6-50 MG per tablet  Commonly known as: PERICOLACE   2 tablets, Oral, 2 Times Daily PRN      traMADol 50 MG tablet  Commonly known as: ULTRAM   50 mg, Oral, Every 6 Hours PRN      traZODone 50 MG tablet  Commonly known as: DESYREL   TAKE 1-2 TABLETS ONE HOUR BEFORE BEDTIME AS NEEDED FOR SLEEP.      VITAMIN C PO   Vitamin C TABS      vitamin D 1.25 MG (86380 UT) capsule capsule  Commonly known as: ERGOCALCIFEROL   Take 1 capsule by mouth once a week         Stop These Medications    hydroCHLOROthiazide 12.5 MG tablet  Commonly known as: HYDRODIURIL        ASK your doctor about these medications      Instructions Start Date   aspirin 81 MG EC tablet   81 mg,  Oral, Daily      Benefiber Drink Mix pack   1 Scoop, Oral, Daily             No Known Allergies      Discharge Disposition:  Home or Self Care    Diet:  Hospital:  Diet Order   Procedures   • Diet Regular; GI Soft       Activity:  Activity Instructions     Activity as Tolerated            Restrictions or Other Recommendations:  Drink plenty of fluids.   Eat a diabetic low residue diet.       CODE STATUS:    Code Status and Medical Interventions:   Ordered at: 06/29/22 8829     Level Of Support Discussed With:    Patient     Code Status (Patient has no pulse and is not breathing):    CPR (Attempt to Resuscitate)     Medical Interventions (Patient has pulse or is breathing):    Full Support       Future Appointments   Date Time Provider Department Center   3/27/2023 11:15 AM Clara Rojas APRN [facility]       Additional Instructions for the Follow-ups that You Need to Schedule     Discharge Follow-up with PCP   As directed       Currently Documented PCP:    Monet Howe APRN    PCP Phone Number:    [phone]     Follow Up Details: Monet DENISE in 1 week         Discharge Follow-up with Specified Provider: Endocrinology please call for appointment.   As directed      To: Endocrinology please call for appointment.         Discharge Follow-up with Specified Provider: Gastroenterology as directed   As directed      To: Gastroenterology as directed    Follow Up Details: Has follow up with gastroparesis specialist tomorrow, as you stated your gastroenterologist here will call you.                     Jose Joy DO  07/04/22      Time Spent on Discharge:  I spent  45  minutes on this discharge activity which included: face-to-face encounter with the patient, reviewing the data in the system, coordination of the care with the nursing staff as well as consultants, documentation, and entering orders.    I updated the DC medication list it is as follows     Discharge Medications       Changes to Medications      Instructions Start Date   Gimoti 15 MG/ACT solution  Generic drug: Metoclopramide HCl  What changed: Another medication with the same name was added. Make sure you understand how and when to take each.   No dose, route, or frequency recorded.      metoclopramide 10 MG tablet  Commonly known as: REGLAN  What changed: You were already taking a medication with the same name, and this prescription was added. Make sure you understand how and when to take each.   10 mg, Oral, 4 Times Daily Before Meals & Nightly         Continue These Medications      Instructions Start Date   acetaminophen 325 MG tablet  Commonly known as: TYLENOL   650 mg, Oral, Every 4 Hours PRN      albuterol sulfate  (90 Base) MCG/ACT inhaler  Commonly known as: PROVENTIL HFA;VENTOLIN HFA;PROAIR HFA   2 puffs, Inhalation, Every 4 Hours PRN      amLODIPine 10 MG tablet  Commonly known as: NORVASC   10 mg, Oral, Daily      aspirin 81 MG EC tablet   81 mg, Oral, Daily      atorvastatin 80 MG tablet  Commonly known as: LIPITOR   80 mg, Oral, Nightly      BD Pen Needle Cydney U/F 32G X 4 MM misc  Generic drug: Insulin Pen Needle   1 each, Oral, 4 Times Daily      calcium carbonate 500 MG chewable tablet  Commonly known as: TUMS   2 tablets, Oral, 3 Times Daily PRN      Dexcom G6  device   1 kit, Does not apply, Every 3 Months      Dexcom G6 Sensor   Does not apply, Every 10 Days      Dexcom G6 Transmitter misc   1 kit, Does not apply, Every 3 Months      donepezil 5 MG tablet  Commonly known as: Aricept   5 mg, Oral, Nightly      doxazosin 1 MG tablet  Commonly known as: CARDURA   1 mg, Oral, Nightly      estradiol 0.1 MG/GM vaginal cream  Commonly known as: ESTRACE VAGINAL   Insert 1 gm intravaginally twice weekly at bedtime.      ferrous sulfate 325 (65 FE) MG tablet   325 mg, Oral, Daily With Breakfast, Take with orange juice or vitamin C      FLUoxetine 20 MG capsule  Commonly known as: PROzac   20 mg, Oral,  Daily      FreeStyle Lite w/Device kit   USE UNIT TO CHECK GLUCOSE 4 TIMES DAILY      gabapentin 400 MG capsule  Commonly known as: NEURONTIN   400 mg, Oral, 2 Times Daily PRN      glucose blood test strip  Commonly known as: Glucose Meter Test   Use as instructed to check blood glucose levels 3 times daily      glucose blood test strip   Use as instructed      glucose monitor monitoring kit   1 each, Does not apply, 4 Times Daily      melatonin 5 MG tablet tablet   5 mg, Oral, Nightly PRN      Multivitamin-Minerals tablet   1 tablet, Oral, Daily      pantoprazole 40 MG EC tablet  Commonly known as: PROTONIX   40 mg, Oral, Daily      sennosides-docusate 8.6-50 MG per tablet  Commonly known as: PERICOLACE   2 tablets, Oral, 2 Times Daily PRN      traMADol 50 MG tablet  Commonly known as: ULTRAM   50 mg, Oral, Every 6 Hours PRN      traZODone 50 MG tablet  Commonly known as: DESYREL   TAKE 1-2 TABLETS ONE HOUR BEFORE BEDTIME AS NEEDED FOR SLEEP.      VITAMIN C PO   Vitamin C TABS      vitamin D 1.25 MG (37758 UT) capsule capsule  Commonly known as: ERGOCALCIFEROL   Take 1 capsule by mouth once a week         Stop These Medications    Benefiber Drink Mix pack     hydroCHLOROthiazide 12.5 MG tablet  Commonly known as: HYDRODIURIL

## 2022-07-04 NOTE — PLAN OF CARE
Goal Outcome Evaluation:  Plan of Care Reviewed With: patient        Progress: no change  Outcome Evaluation: VSS NSR on monitor; monitoring BS with her Dexcon glucose monitor; encouraged to eat bedtime snack to  maintain blood sugar throughout the night; call light with in her reach; will continue to monitor

## 2022-07-04 NOTE — OUTREACH NOTE
Prep Survey    Flowsheet Row Responses   [facility] facility patient discharged from? [place]   Is LACE score < 7 ? No   Emergency Room discharge w/ pulse ox? No   Eligibility [facility]   Date of Admission 06/29/22   Date of Discharge 07/04/22   Discharge Disposition Home or Self Care   Discharge diagnosis Elevated serum creatinine Intractable nausea and vomiting    Does the patient have one of the following disease processes/diagnoses(primary or secondary)? Other   Does the patient have Home health ordered? No   Is there a DME ordered? No   Prep survey completed? Yes          ALEENA FROST - Registered Nurse

## 2022-07-04 NOTE — PROGRESS NOTES
Nutrition Services    Patient Name:  Thelma Michael  YOB: 1960  MRN: [id number]  Admit Date:  6/29/2022    Pt has been educated re diet for gasrtoparesis prior, assured she has the material for reference. Pt took material to be sure she has and allows she uses this. Gave some menu prefs. Follow per protocol.     Electronically signed by:  Ella Mcmanus RD  07/03/22 21:01 EDT

## 2022-07-05 ENCOUNTER — TRANSITIONAL CARE MANAGEMENT TELEPHONE ENCOUNTER (OUTPATIENT)
Dept: CALL CENTER | Facility: HOSPITAL | Age: 62
End: 2022-07-05

## 2022-07-05 NOTE — OUTREACH NOTE
Call Center TCM Note    Flowsheet Row Responses   [facility] patient discharged from? [facility]   Does the patient have one of the following disease processes/diagnoses(primary or secondary)? Other   TCM attempt successful? No   Unsuccessful attempts Attempt 1          Belgica Cintron LPN    7/5/2022, 11:04 EDT

## 2022-07-06 ENCOUNTER — OFFICE VISIT (OUTPATIENT)
Dept: INTERNAL MEDICINE | Facility: CLINIC | Age: 62
End: 2022-07-06

## 2022-07-06 VITALS
BODY MASS INDEX: 19.79 KG/M2 | HEIGHT: 68 IN | TEMPERATURE: 97.1 F | HEART RATE: 93 BPM | OXYGEN SATURATION: 99 % | DIASTOLIC BLOOD PRESSURE: 70 MMHG | WEIGHT: 130.6 LBS | SYSTOLIC BLOOD PRESSURE: 142 MMHG

## 2022-07-06 DIAGNOSIS — R22.2 MASS OF SKIN OF BACK: ICD-10-CM

## 2022-07-06 DIAGNOSIS — K31.84 GASTROPARESIS: Primary | ICD-10-CM

## 2022-07-06 PROCEDURE — 99213 OFFICE O/P EST LOW 20 MIN: CPT | Performed by: INTERNAL MEDICINE

## 2022-07-06 NOTE — PROGRESS NOTES
Transitional Care Follow Up Visit  Subjective     Thelma Michael is a 61 y.o. female who presents for a transitional care management visit.    Within 48 business hours after discharge our office contacted her via telephone to coordinate her care and needs.      I reviewed and discussed the details of that call along with the discharge summary, hospital problems, inpatient lab results, inpatient diagnostic studies, and consultation reports with Thelma.     Current outpatient and discharge medications have been reconciled for the patient.  Reviewed by: Lanny Lehman MD      Date of TCM Phone Call 7/4/2022   [facility]   Date of Admission 6/29/2022   Date of Discharge 7/4/2022   Discharge Disposition Home or Self Care     Risk for Readmission (LACE) Score: 11 (7/4/2022  6:01 AM)      History of Present Illness   Course During Hospital Stay: Admitted to [facility] for gastroparesis.  Has seen gastroparesis clinic removal since then.  Getting repeat CMP today at [facility].  Denies any nausea, vomiting.  Staying well hydrated.  Has mass on her back that was noted during hospitalization.  Nontender.     The following portions of the patient's history were reviewed and updated as appropriate: allergies, current medications, past family history, past medical history, past social history, past surgical history and problem list.    Review of Systems   Constitutional: Negative for activity change, appetite change, chills, diaphoresis, fatigue, fever and unexpected weight change.   HENT: Negative for congestion, dental problem, drooling, ear discharge, ear pain, facial swelling, hearing loss and mouth sores.    Eyes: Negative for pain, discharge and itching.   Respiratory: Negative for apnea, cough, choking, chest tightness and shortness of breath.    Cardiovascular: Negative for chest pain, palpitations and leg swelling.   Gastrointestinal: Negative for abdominal distention, abdominal pain, blood in  stool, constipation and diarrhea.   Endocrine: Negative for cold intolerance, heat intolerance, polydipsia and polyuria.   Genitourinary: Negative for difficulty urinating, dysuria, frequency and hematuria.   Skin: Negative for color change, pallor, rash and wound.   Allergic/Immunologic: Negative for environmental allergies, food allergies and immunocompromised state.   Neurological: Negative for dizziness, weakness and light-headedness.   Psychiatric/Behavioral: Negative for agitation, behavioral problems, confusion, decreased concentration and self-injury. The patient is not nervous/anxious.    All other systems reviewed and are negative.      Objective   Physical Exam  Vitals and nursing note reviewed.   Constitutional:       General: She is not in acute distress.     Appearance: Normal appearance. She is normal weight. She is not ill-appearing, toxic-appearing or diaphoretic.   HENT:      Head: Normocephalic and atraumatic.   Eyes:      General:         Right eye: No discharge.         Left eye: No discharge.      Conjunctiva/sclera: Conjunctivae normal.   Musculoskeletal:      Comments: Firm, knot- like mass on right lateral back beneath scapula, nontender, no erythema   Neurological:      Mental Status: She is alert.         Assessment & Plan   Diagnoses and all orders for this visit:    1. Gastroparesis (Primary)  Resolved,  repeat CMP today.  2. Mass of skin of back  -     US Nonvascular Extremity Limited; Future  Mass likely benign cyst versus muscle knot, will get ultrasound for further evaluation

## 2022-07-07 ENCOUNTER — PATIENT ROUNDING (BHMG ONLY) (OUTPATIENT)
Dept: INTERNAL MEDICINE | Facility: CLINIC | Age: 62
End: 2022-07-07

## 2022-07-07 NOTE — PROGRESS NOTES
A  my chart message has been sent to the patient for patient rounding with [facility].

## 2022-07-12 ENCOUNTER — READMISSION MANAGEMENT (OUTPATIENT)
Dept: CALL CENTER | Facility: HOSPITAL | Age: 62
End: 2022-07-12

## 2022-07-12 NOTE — OUTREACH NOTE
Medical Week 2 Survey    Flowsheet Row Responses   [facility] patient discharged from? [place]   Does the patient have one of the following disease processes/diagnoses(primary or secondary)? Other   Week 2 attempt successful? Yes   Call start time 1229   Discharge diagnosis Elevated serum creatinine Intractable nausea and vomiting    Call end time 1231   Meds reviewed with patient/caregiver? Yes   Is the patient taking all medications as directed (includes completed medication regime)? Yes   Has the patient kept scheduled appointments due by today? Yes   Psychosocial issues? No   What is the patient's perception of their health status since discharge? Improving   Is the patient/caregiver able to teach back the hierarchy of who to call/visit for symptoms/problems? PCP, Specialist, Home health nurse, Urgent Care, ED, 911 Yes   Additional teach back comments Doing better. Had some nausea but its getting better for her and BS was 98 today   Week 2 Call Completed? Yes          HAY RODRIGUEZ - Registered Nurse

## 2022-07-21 ENCOUNTER — READMISSION MANAGEMENT (OUTPATIENT)
Dept: CALL CENTER | Facility: HOSPITAL | Age: 62
End: 2022-07-21

## 2022-07-21 NOTE — OUTREACH NOTE
Medical Week 3 Survey    Flowsheet Row Responses   [facility] patient discharged from? [place]   Does the patient have one of the following disease processes/diagnoses(primary or secondary)? Other   Week 3 attempt successful? No   Unsuccessful attempts Attempt 1          JOAN CASTELAN - Registered Nurse

## 2022-08-02 DIAGNOSIS — E11.42 DIABETIC PERIPHERAL NEUROPATHY: ICD-10-CM

## 2022-08-02 DIAGNOSIS — F51.01 PRIMARY INSOMNIA: ICD-10-CM

## 2022-08-02 DIAGNOSIS — K21.9 GASTROESOPHAGEAL REFLUX DISEASE: ICD-10-CM

## 2022-08-02 DIAGNOSIS — E43 SEVERE MALNUTRITION: ICD-10-CM

## 2022-08-02 RX ORDER — GABAPENTIN 400 MG/1
CAPSULE ORAL
Qty: 60 CAPSULE | Refills: 0 | OUTPATIENT
Start: 2022-08-02

## 2022-08-02 RX ORDER — MIRTAZAPINE 15 MG/1
TABLET, FILM COATED ORAL
Qty: 90 TABLET | Refills: 0 | Status: SHIPPED | OUTPATIENT
Start: 2022-08-02 | End: 2022-11-14

## 2022-08-02 RX ORDER — PANTOPRAZOLE SODIUM 40 MG/1
TABLET, DELAYED RELEASE ORAL
Qty: 30 TABLET | Refills: 0 | Status: SHIPPED | OUTPATIENT
Start: 2022-08-02 | End: 2022-09-21

## 2022-08-02 RX ORDER — ERGOCALCIFEROL 1.25 MG/1
CAPSULE ORAL
Qty: 4 CAPSULE | Refills: 0 | Status: SHIPPED | OUTPATIENT
Start: 2022-08-02 | End: 2022-12-28

## 2022-08-02 RX ORDER — MEGESTROL ACETATE 40 MG/1
TABLET ORAL
Qty: 30 TABLET | Refills: 0 | OUTPATIENT
Start: 2022-08-02

## 2022-08-02 RX ORDER — TRAZODONE HYDROCHLORIDE 50 MG/1
TABLET ORAL
Qty: 45 TABLET | Refills: 0 | Status: SHIPPED | OUTPATIENT
Start: 2022-08-02 | End: 2023-01-05 | Stop reason: SDUPTHER

## 2022-08-03 ENCOUNTER — HOSPITAL ENCOUNTER (OUTPATIENT)
Dept: ULTRASOUND IMAGING | Facility: HOSPITAL | Age: 62
Discharge: HOME | End: 2022-08-03
Admitting: INTERNAL MEDICINE

## 2022-08-03 DIAGNOSIS — R22.2 MASS OF SKIN OF BACK: ICD-10-CM

## 2022-08-03 PROCEDURE — 76604 US EXAM CHEST: CPT

## 2022-08-04 ENCOUNTER — OFFICE VISIT (OUTPATIENT)
Dept: ENDOCRINOLOGY | Facility: CLINIC | Age: 62
End: 2022-08-04

## 2022-08-04 VITALS
BODY MASS INDEX: 20.46 KG/M2 | WEIGHT: 135 LBS | HEIGHT: 68 IN | HEART RATE: 77 BPM | DIASTOLIC BLOOD PRESSURE: 75 MMHG | OXYGEN SATURATION: 100 % | SYSTOLIC BLOOD PRESSURE: 124 MMHG

## 2022-08-04 DIAGNOSIS — E10.65 UNCONTROLLED TYPE 1 DIABETES MELLITUS WITH HYPERGLYCEMIA: Primary | ICD-10-CM

## 2022-08-04 LAB
EXPIRATION DATE: NORMAL
GLUCOSE BLDC GLUCOMTR-MCNC: 90 MG/DL (ref 70–130)
Lab: NORMAL

## 2022-08-04 PROCEDURE — 99213 OFFICE O/P EST LOW 20 MIN: CPT | Performed by: INTERNAL MEDICINE

## 2022-08-04 PROCEDURE — 82947 ASSAY GLUCOSE BLOOD QUANT: CPT | Performed by: INTERNAL MEDICINE

## 2022-08-04 NOTE — ASSESSMENT & PLAN NOTE
Amazing drop in a1c with her advanced pump system. She is using it correctly and generally feels good. The gastropathy is being addressed.i adjsted her carb ratio to avoid hypoglyemia

## 2022-08-04 NOTE — PROGRESS NOTES
"     Office Note      Date: 2022  Patient Name: Thelma Michael  MRN: [id number]  : 1960    Chief Complaint   Patient presents with   • Diabetes       History of Present Illness:   Thelma Michael is a 61 y.o. female who presents for Diabetes - type 1  Last A1c:>13  Hemoglobin A1C   Date Value Ref Range Status   2022 6.70 (H) 4.80 - 5.60 % Final     When I last saw her in January her a1c was >13. Since then we got her on a pump and sensor but she has had trouble getting back in to see me. She cancelled 2 follow ups and no showed a 3rd. She has been in the hospital several times with issues relating to gastroparesis and is seeing a gastroparesis specialist in [place] now. Will be having a gastric pacer placed   Her most recent a1c though was down to 6.7 and she has gone from 124 pounds to 135.        She is using insulin in a tandem pump with control [employer] technology  Bg is check 288 times per day with dexcom.  Insulin doses are adjusted frequently based upon the readings.  Patient has occasional hypoglycemia.  Patient has been using the system during the last 3 months.  Changes in health since last visit:  See abjason . Last eye exam- due and advised .    Subjective              Review of Systems:   Review of Systems   Constitutional: Negative for fatigue and unexpected weight change.       The following portions of the patient's history were reviewed and updated as appropriate: allergies, current medications, past family history, past medical history, past social history, past surgical history and problem list.    Objective     Visit Vitals  /75   Pulse 77   Ht 172.7 cm (68\")   Wt 61.2 kg (135 lb)   SpO2 100%   BMI 20.53 kg/m²       Labs:    CMP  Lab Results   Component Value Date    GLUCOSE 131 (H) 2022    BUN 20 2022    CREATININE 1.22 (H) 2022    EGFRIFNONA 61 2015    EGFRIFAFRI 62 2022    BCR 16.4 2022    K 4.0 2022    CO2 24.0 " 07/04/2022    CALCIUM 8.6 07/04/2022    PROTENTOTREF 7.6 01/06/2015    LABIL2 1.6 01/06/2015    AST 12 06/29/2022    ALT 8 06/29/2022        CBC w/DIFF  Lab Results   Component Value Date    WBC 6.32 07/02/2022    RBC 3.68 (L) 07/02/2022    HGB 10.9 (L) 07/02/2022    HCT 32.4 (L) 07/02/2022    MCV 88.0 07/02/2022    MCH 29.6 07/02/2022    MCHC 33.6 07/02/2022    RDW 12.7 07/02/2022    RDWSD 41.3 07/02/2022    MPV 11.1 07/02/2022     07/02/2022    NEUTRORELPCT 57.1 07/02/2022    LYMPHORELPCT 35.8 07/02/2022    MONORELPCT 6.5 07/02/2022    EOSRELPCT 0.3 07/02/2022    BASORELPCT 0.0 07/02/2022    AUTOIGPER 0.3 07/02/2022    NEUTROABS 3.61 07/02/2022    LYMPHSABS 2.26 07/02/2022    MONOSABS 0.41 07/02/2022    EOSABS 0.02 07/02/2022    BASOSABS 0.00 07/02/2022    AUTOIGNUM 0.02 07/02/2022    NRBC 0.0 07/02/2022       Physical Exam:  Physical Exam  Vitals reviewed.   Constitutional:       Appearance: Normal appearance.   Cardiovascular:      Pulses:           Dorsalis pedis pulses are 1+ on the right side and 1+ on the left side.        Posterior tibial pulses are 1+ on the right side and 1+ on the left side.   Feet:      Right foot:      Protective Sensation: 10 sites tested. 6 sites sensed.      Skin integrity: Skin integrity normal.      Toenail Condition: Right toenails are normal.      Left foot:      Protective Sensation: 10 sites tested. 6 sites sensed.      Skin integrity: Skin integrity normal.      Toenail Condition: Left toenails are normal.      Comments: Diabetic Foot Exam Performed and Monofilament Test Performed  Neurological:      Mental Status: She is alert.   Psychiatric:         Mood and Affect: Mood normal.         Behavior: Behavior normal.         Thought Content: Thought content normal.         Judgment: Judgment normal.          Assessment / Plan      Assessment & Plan:  Problem List Items Addressed This Visit        Other    Uncontrolled type 1 diabetes mellitus with hyperglycemia (HCC) -  Primary    Overview     dx'd age 38 . Has had dka         Current Assessment & Plan     Amazing drop in a1c with her advanced pump system. She is using it correctly and generally feels good. The gastropathy is being addressed.i adjsted her carb ratio to avoid hypoglyemia         Relevant Medications    Continuous Blood Gluc  (Dexcom G6 ) device    Continuous Blood Gluc Sensor (Dexcom G6 Sensor)    Continuous Blood Gluc Transmit (Dexcom G6 Transmitter) misc    Other Relevant Orders    POC Glucose, Blood (Completed)           Gerson Ochoa MD   08/04/2022

## 2022-08-09 ENCOUNTER — PRIOR AUTHORIZATION (OUTPATIENT)
Dept: ENDOCRINOLOGY | Facility: CLINIC | Age: 62
End: 2022-08-09

## 2022-08-09 NOTE — TELEPHONE ENCOUNTER
CHRISPEDRO KENNY Key: [id number] - PA Case ID: [id number] - Rx #: [id number] help? Call us at [phone]  Outcome  Approvedtoday  Your PA request has been approved. Additional information will be provided in the approval communication. (Message 1145)  Drug  Trulicity 1.5MG/0.5ML pen-injectors  Form  [facility] Electronic PA Form (2017 NCPDP)

## 2022-08-27 NOTE — NURSING NOTE
Educated patient about how to self-catheterize. She performed her own cath with minimal assistance. Gave reading education sheets and gave her information to find online. Discharge instructions about home health, [facility] and to return to ER is problems persisted.  
normal...

## 2022-09-02 ENCOUNTER — OFFICE VISIT (OUTPATIENT)
Dept: GASTROENTEROLOGY | Facility: CLINIC | Age: 62
End: 2022-09-02

## 2022-09-02 VITALS
OXYGEN SATURATION: 97 % | SYSTOLIC BLOOD PRESSURE: 142 MMHG | BODY MASS INDEX: 20.37 KG/M2 | WEIGHT: 134.4 LBS | HEIGHT: 68 IN | TEMPERATURE: 97.3 F | DIASTOLIC BLOOD PRESSURE: 88 MMHG | HEART RATE: 85 BPM

## 2022-09-02 DIAGNOSIS — E44.0 MODERATE PROTEIN-CALORIE MALNUTRITION: ICD-10-CM

## 2022-09-02 DIAGNOSIS — E11.43 GASTROPARESIS DUE TO DM: Primary | ICD-10-CM

## 2022-09-02 DIAGNOSIS — K31.84 GASTROPARESIS DUE TO DM: Primary | ICD-10-CM

## 2022-09-02 PROCEDURE — 99213 OFFICE O/P EST LOW 20 MIN: CPT | Performed by: NURSE PRACTITIONER

## 2022-09-02 RX ORDER — MULTIVITAMIN
1 TABLET ORAL DAILY
COMMUNITY
Start: 2022-08-02 | End: 2023-02-10

## 2022-09-02 NOTE — PROGRESS NOTES
Follow Up      Patient Name: Thelma Michael  : 1960   MRN: [id number]     Chief Complaint:    Chief Complaint   Patient presents with   • Hospital Follow Up Visit       History of Present Illness: Thelma Michael is a 61 y.o. female who is here today for follow up on gastroparesis.    Using gimoti as needed which is helpful.  Taking reglan as needed. Taking taking ginger root with every meal. A1C down to 6.7.  Gaining weight. No longer having LE edema.      Having normal BMs. Occasionally, having breakthrough reflux- compliant with PPI.      Has since established with motility clinic-Dr. Lane- is scheduled for gastric pacemaker next month.   Subjective      Review of Systems:   Review of Systems   Constitutional: Negative for appetite change and unexpected weight loss.   HENT: Negative for trouble swallowing.    Gastrointestinal: Positive for nausea and GERD. Negative for abdominal distention, abdominal pain, anal bleeding, blood in stool, constipation, diarrhea, rectal pain, vomiting and indigestion.       Medications:     Current Outpatient Medications:   •  acetaminophen (TYLENOL) 325 MG tablet, Take 2 tablets by mouth Every 4 (Four) Hours As Needed for Mild Pain ., Disp: , Rfl:   •  albuterol sulfate  (90 Base) MCG/ACT inhaler, Inhale 2 puffs Every 4 (Four) Hours As Needed for Wheezing., Disp: 18 g, Rfl: 5  •  amLODIPine (NORVASC) 10 MG tablet, Take 1 tablet by mouth Daily., Disp: 90 tablet, Rfl: 1  •  Ascorbic Acid (VITAMIN C PO), Vitamin C TABS, Disp: , Rfl:   •  aspirin 81 MG EC tablet, Take 1 tablet by mouth Daily., Disp: , Rfl:   •  atorvastatin (LIPITOR) 80 MG tablet, Take 1 tablet by mouth Every Night., Disp: 90 tablet, Rfl: 1  •  Blood Glucose Monitoring Suppl (FreeStyle Lite) w/Device kit, USE UNIT TO CHECK GLUCOSE 4 TIMES DAILY, Disp: , Rfl:   •  calcium carbonate (TUMS) 500 MG chewable tablet, Chew 1,000 mg 3 (Three) Times a Day As Needed for Indigestion or  Heartburn., Disp: , Rfl:   •  Continuous Blood Gluc  (Dexcom G6 ) device, 1 kit Every 3 (Three) Months., Disp: 1 each, Rfl: 0  •  Continuous Blood Gluc Sensor (Dexcom G6 Sensor), Every 10 (Ten) Days., Disp: 9 each, Rfl: 3  •  Continuous Blood Gluc Transmit (Dexcom G6 Transmitter) misc, 1 kit Every 3 (Three) Months., Disp: 1 each, Rfl: 3  •  donepezil (Aricept) 5 MG tablet, Take 1 tablet by mouth Every Night., Disp: 30 tablet, Rfl: 1  •  doxazosin (CARDURA) 1 MG tablet, Take 1 tablet by mouth Every Night., Disp: 90 tablet, Rfl: 1  •  estradiol (ESTRACE VAGINAL) 0.1 MG/GM vaginal cream, Insert 1 gm intravaginally twice weekly at bedtime., Disp: 42.5 g, Rfl: 4  •  ferrous sulfate 325 (65 FE) MG tablet, Take 1 tablet by mouth Daily With Breakfast. Take with orange juice or vitamin C, Disp: 30 tablet, Rfl: 2  •  FLUoxetine (PROzac) 20 MG capsule, Take 1 capsule by mouth Daily., Disp: 90 capsule, Rfl: 1  •  gabapentin (NEURONTIN) 400 MG capsule, Take 1 capsule by mouth 2 (Two) Times a Day As Needed (leg and foot pain)., Disp: 60 capsule, Rfl: 1  •  Gimoti 15 MG/ACT solution, , Disp: , Rfl:   •  glucose blood (Glucose Meter Test) test strip, Use as instructed to check blood glucose levels 3 times daily, Disp: 100 each, Rfl: 5  •  glucose blood test strip, Use as instructed, Disp: 200 each, Rfl: 12  •  glucose monitor monitoring kit, 1 each 4 (Four) Times a Day., Disp: 1 each, Rfl: 0  •  Insulin Pen Needle (BD Pen Needle Cydney U/F) 32G X 4 MM misc, Take 1 each by mouth 4 (Four) Times a Day., Disp: 100 each, Rfl: 5  •  melatonin 5 MG tablet tablet, Take 1 tablet by mouth At Night As Needed (sleep)., Disp: , Rfl:   •  metoclopramide (REGLAN) 10 MG tablet, Take 1 tablet by mouth 4 (Four) Times a Day Before Meals & at Bedtime., Disp: 120 tablet, Rfl: 2  •  mirtazapine (REMERON) 15 MG tablet, TAKE 1 TABLET BY MOUTH ONCE DAILY AT NIGHT, Disp: 90 tablet, Rfl: 0  •  Multiple Vitamins-Minerals (Multivitamin-Minerals)  tablet, Take 1 tablet by mouth Daily., Disp: , Rfl:   •  Multivitamin tablet tablet, Take 1 tablet by mouth Daily., Disp: , Rfl:   •  pantoprazole (PROTONIX) 40 MG EC tablet, Take 1 tablet by mouth once daily, Disp: 30 tablet, Rfl: 0  •  sennosides-docusate (senna-docusate sodium) 8.6-50 MG per tablet, Take 2 tablets by mouth 2 (Two) Times a Day As Needed for Constipation., Disp: 60 tablet, Rfl: 5  •  traMADol (ULTRAM) 50 MG tablet, Take 1 tablet by mouth Every 6 (Six) Hours As Needed for Moderate Pain ., Disp: 28 tablet, Rfl: 2  •  traZODone (DESYREL) 50 MG tablet, TAKE 1 TO 2 TABLETS BY MOUTH AT BEDTIME AS NEEDED FOR SLEEP, Disp: 45 tablet, Rfl: 0  •  vitamin D (ERGOCALCIFEROL) 1.25 MG (26156 UT) capsule capsule, Take 1 capsule by mouth once a week, Disp: 4 capsule, Rfl: 0    Allergies:   No Known Allergies    Social History:   Social History     Socioeconomic History   • Marital status:    Tobacco Use   • Smoking status: Former Smoker     Packs/day: 0.25     Years: 30.00     Pack years: 7.50     Types: Cigarettes     Quit date: 5/28/2019     Years since quitting: 3.2   • Smokeless tobacco: Never Used   • Tobacco comment: [facility] never smoker    Vaping Use   • Vaping Use: Never used   Substance and Sexual Activity   • Alcohol use: Yes     Alcohol/week: 1.0 standard drink     Types: 1 Glasses of wine per week     Comment: ocassional   • Drug use: No   • Sexual activity: Not Currently     Comment: Single         Surgical History:   Past Surgical History:   Procedure Laterality Date   • CAPSULE ENDOSCOPY  07/27/2021    Procedure: PILLCAM DEPLOYMENT;  Surgeon: Mikael Worthy MD;  Location:  [facility];  Service: Gastroenterology;;   • COLONOSCOPY     • COLONOSCOPY N/A 07/27/2021    Procedure: COLONOSCOPY;  Surgeon: Mikael Worthy MD;  Location:  [facility];  Service: Gastroenterology;  Laterality: N/A;   • DENTAL PROCEDURE     • ENDOSCOPY N/A 06/30/2021    Procedure:  "ESOPHAGOGASTRODUODENOSCOPY;  Surgeon: Brunner, Mark I, MD;  Location:  [facility];  Service: Gastroenterology;  Laterality: N/A;   • ENDOSCOPY N/A 07/27/2021    Procedure: ESOPHAGOGASTRODUODENOSCOPY;  Surgeon: Mikael Worthy MD;  Location:  [facility];  Service: Gastroenterology;  Laterality: N/A;   • ENDOSCOPY WITH JTUBE N/A 03/16/2022    Procedure: ESOPHAGOGASTRODUODENOSCOPY WITH JEJUNAL TUBE INSERTION;  Surgeon: Thom Glover MD;  Location:  [facility];  Service: Gastroenterology;  Laterality: N/A;   • NO PAST SURGERIES     • UPPER GASTROINTESTINAL ENDOSCOPY          Medical History:   Past Medical History:   Diagnosis Date   • Acid reflux    • Acute bronchitis    • Cardiac murmur    • Diabetes mellitus (HCC)    • H/O echocardiogram 08/07/2012    i. LVEF 65%.ii. Mild LVH.iii. Borderline evidence of atrial septal aneurysm.  No PFO.    • History of nuclear stress test 08/22/2014    Negative for ischemia and scars; LVEF 77%.     • Hyperlipidemia    • Hypertension    • Impacted cerumen of both ears    • Migraine    • Self-catheterizes urinary bladder    • Sinusitis    • Stroke (HCC)    • Tobacco abuse     quit 4 days ago.     • Urticaria         Objective     Physical Exam:  Vital Signs:   Vitals:    09/02/22 1043   BP: 142/88   BP Location: Left arm   Patient Position: Sitting   Cuff Size: Adult   Pulse: 85   Temp: 97.3 °F (36.3 °C)   TempSrc: Temporal   SpO2: 97%   Weight: 61 kg (134 lb 6.4 oz)   Height: 172.7 cm (67.99\")     Body mass index is 20.44 kg/m².     Physical Exam  Vitals and nursing note reviewed.   Constitutional:       General: She is not in acute distress.     Appearance: She is well-developed. She is not diaphoretic.   Eyes:      General: No scleral icterus.     Conjunctiva/sclera: Conjunctivae normal.   Neck:      Thyroid: No thyromegaly.   Cardiovascular:      Rate and Rhythm: Normal rate and regular rhythm.   Pulmonary:      Effort: Pulmonary effort is normal.      Breath " sounds: Normal breath sounds.   Abdominal:      General: Bowel sounds are normal. There is no distension.      Palpations: Abdomen is soft.      Tenderness: There is no abdominal tenderness. There is no guarding or rebound.      Hernia: No hernia is present.   Musculoskeletal:      Cervical back: Neck supple.      Right lower leg: No edema.      Left lower leg: No edema.   Skin:     General: Skin is warm and dry.      Capillary Refill: Capillary refill takes 2 to 3 seconds.      Coloration: Skin is not jaundiced or pale.      Findings: No bruising or petechiae.      Nails: There is no clubbing.   Neurological:      Mental Status: She is alert and oriented to person, place, and time.   Psychiatric:         Behavior: Behavior normal.         Thought Content: Thought content normal.         Judgment: Judgment normal.         Assessment / Plan      Assessment/Plan:   Diagnoses and all orders for this visit:    1. Gastroparesis due to DM (HCC) (Primary)  Gaining weight, doing well at this venture.  A1c at goal.  Plan for gastric pacemaker placement next month.  Continue with gingerroot daily and gastroparesis diet.  She will start using Reglan at the first start of nausea.  She has gimoti to use if she is unable to keep down her p.o. Reglan.  2. Moderate protein-calorie malnutrition (HCC)  14 pound weight gain       Follow Up: at Motility clinic as scheduled  No follow-ups on file.    Plan of care reviewed with the patient at the conclusion of today's visit.  Education was provided regarding diagnosis, management, and any prescribed or recommended OTC medications.  Patient verbalized understanding of and agreement with management plan.     Time Statement:   Discussed plan of care in detail with patient today. Patient verbally understands and agrees. I have spent 20 minutes reviewing available diagnostics, obtaining history, examining the patient, developing a treatment plan, and educating the patient on disease process  and plan of care.     HOWIE Samuels  [facility]

## 2022-09-17 DIAGNOSIS — K21.9 GASTROESOPHAGEAL REFLUX DISEASE: ICD-10-CM

## 2022-09-20 ENCOUNTER — APPOINTMENT (OUTPATIENT)
Dept: GENERAL RADIOLOGY | Facility: HOSPITAL | Age: 62
End: 2022-09-20

## 2022-09-20 ENCOUNTER — APPOINTMENT (OUTPATIENT)
Dept: CT IMAGING | Facility: HOSPITAL | Age: 62
End: 2022-09-20

## 2022-09-20 ENCOUNTER — HOSPITAL ENCOUNTER (EMERGENCY)
Facility: HOSPITAL | Age: 62
Discharge: HOME | End: 2022-09-20
Attending: EMERGENCY MEDICINE | Admitting: EMERGENCY MEDICINE

## 2022-09-20 VITALS
HEART RATE: 99 BPM | WEIGHT: 118 LBS | TEMPERATURE: 98.9 F | OXYGEN SATURATION: 100 % | HEIGHT: 68 IN | RESPIRATION RATE: 16 BRPM | DIASTOLIC BLOOD PRESSURE: 93 MMHG | SYSTOLIC BLOOD PRESSURE: 178 MMHG | BODY MASS INDEX: 17.88 KG/M2

## 2022-09-20 DIAGNOSIS — R10.10 UPPER ABDOMINAL PAIN: Primary | ICD-10-CM

## 2022-09-20 DIAGNOSIS — R11.2 NAUSEA AND VOMITING, UNSPECIFIED VOMITING TYPE: ICD-10-CM

## 2022-09-20 DIAGNOSIS — Z86.39 HISTORY OF DIABETIC GASTROPARESIS: ICD-10-CM

## 2022-09-20 LAB
ALBUMIN SERPL-MCNC: 5 G/DL (ref 3.5–5.2)
ALBUMIN/GLOB SERPL: 1.4 G/DL
ALP SERPL-CCNC: 93 U/L (ref 39–117)
ALT SERPL W P-5'-P-CCNC: 13 U/L (ref 1–33)
ANION GAP SERPL CALCULATED.3IONS-SCNC: 21 MMOL/L (ref 5–15)
AST SERPL-CCNC: 19 U/L (ref 1–32)
BACTERIA UR QL AUTO: ABNORMAL /HPF
BACTERIA UR QL AUTO: ABNORMAL /HPF
BASOPHILS # BLD AUTO: 0.02 10*3/MM3 (ref 0–0.2)
BASOPHILS NFR BLD AUTO: 0.3 % (ref 0–1.5)
BILIRUB SERPL-MCNC: 0.6 MG/DL (ref 0–1.2)
BILIRUB UR QL STRIP: NEGATIVE
BILIRUB UR QL STRIP: NEGATIVE
BUN SERPL-MCNC: 31 MG/DL (ref 8–23)
BUN/CREAT SERPL: 18.2 (ref 7–25)
CALCIUM SPEC-SCNC: 10.1 MG/DL (ref 8.6–10.5)
CHLORIDE SERPL-SCNC: 99 MMOL/L (ref 98–107)
CLARITY UR: ABNORMAL
CLARITY UR: CLEAR
CO2 SERPL-SCNC: 24 MMOL/L (ref 22–29)
COLOR UR: YELLOW
COLOR UR: YELLOW
CREAT SERPL-MCNC: 1.7 MG/DL (ref 0.57–1)
D-LACTATE SERPL-SCNC: 1.6 MMOL/L (ref 0.5–2)
DEPRECATED RDW RBC AUTO: 42 FL (ref 37–54)
EGFRCR SERPLBLD CKD-EPI 2021: 33.6 ML/MIN/1.73
EOSINOPHIL # BLD AUTO: 0.02 10*3/MM3 (ref 0–0.4)
EOSINOPHIL NFR BLD AUTO: 0.3 % (ref 0.3–6.2)
ERYTHROCYTE [DISTWIDTH] IN BLOOD BY AUTOMATED COUNT: 12.8 % (ref 12.3–15.4)
GLOBULIN UR ELPH-MCNC: 3.5 GM/DL
GLUCOSE SERPL-MCNC: 78 MG/DL (ref 65–99)
GLUCOSE UR STRIP-MCNC: NEGATIVE MG/DL
GLUCOSE UR STRIP-MCNC: NEGATIVE MG/DL
HCT VFR BLD AUTO: 41.4 % (ref 34–46.6)
HGB BLD-MCNC: 14.1 G/DL (ref 12–15.9)
HGB UR QL STRIP.AUTO: NEGATIVE
HGB UR QL STRIP.AUTO: NEGATIVE
HOLD SPECIMEN: NORMAL
HYALINE CASTS UR QL AUTO: ABNORMAL /LPF
HYALINE CASTS UR QL AUTO: ABNORMAL /LPF
IMM GRANULOCYTES # BLD AUTO: 0.02 10*3/MM3 (ref 0–0.05)
IMM GRANULOCYTES NFR BLD AUTO: 0.3 % (ref 0–0.5)
KETONES UR QL STRIP: ABNORMAL
KETONES UR QL STRIP: ABNORMAL
LEUKOCYTE ESTERASE UR QL STRIP.AUTO: ABNORMAL
LEUKOCYTE ESTERASE UR QL STRIP.AUTO: NEGATIVE
LYMPHOCYTES # BLD AUTO: 1.93 10*3/MM3 (ref 0.7–3.1)
LYMPHOCYTES NFR BLD AUTO: 31.2 % (ref 19.6–45.3)
MAGNESIUM SERPL-MCNC: 2.1 MG/DL (ref 1.6–2.4)
MCH RBC QN AUTO: 30.6 PG (ref 26.6–33)
MCHC RBC AUTO-ENTMCNC: 34.1 G/DL (ref 31.5–35.7)
MCV RBC AUTO: 89.8 FL (ref 79–97)
MONOCYTES # BLD AUTO: 0.32 10*3/MM3 (ref 0.1–0.9)
MONOCYTES NFR BLD AUTO: 5.2 % (ref 5–12)
NEUTROPHILS NFR BLD AUTO: 3.88 10*3/MM3 (ref 1.7–7)
NEUTROPHILS NFR BLD AUTO: 62.7 % (ref 42.7–76)
NITRITE UR QL STRIP: NEGATIVE
NITRITE UR QL STRIP: NEGATIVE
NRBC BLD AUTO-RTO: 0 /100 WBC (ref 0–0.2)
PH UR STRIP.AUTO: 5.5 [PH] (ref 5–8)
PH UR STRIP.AUTO: 5.5 [PH] (ref 5–8)
PLATELET # BLD AUTO: 381 10*3/MM3 (ref 140–450)
PMV BLD AUTO: 10.3 FL (ref 6–12)
POTASSIUM SERPL-SCNC: 3.5 MMOL/L (ref 3.5–5.2)
PROT SERPL-MCNC: 8.5 G/DL (ref 6–8.5)
PROT UR QL STRIP: ABNORMAL
PROT UR QL STRIP: ABNORMAL
RBC # BLD AUTO: 4.61 10*6/MM3 (ref 3.77–5.28)
RBC # UR STRIP: ABNORMAL /HPF
RBC # UR STRIP: ABNORMAL /HPF
REF LAB TEST METHOD: ABNORMAL
REF LAB TEST METHOD: ABNORMAL
SODIUM SERPL-SCNC: 144 MMOL/L (ref 136–145)
SP GR UR STRIP: 1.02 (ref 1–1.03)
SP GR UR STRIP: 1.02 (ref 1–1.03)
SQUAMOUS #/AREA URNS HPF: ABNORMAL /HPF
SQUAMOUS #/AREA URNS HPF: ABNORMAL /HPF
TROPONIN T SERPL-MCNC: 0.02 NG/ML (ref 0–0.03)
UROBILINOGEN UR QL STRIP: ABNORMAL
UROBILINOGEN UR QL STRIP: ABNORMAL
WBC # UR STRIP: ABNORMAL /HPF
WBC # UR STRIP: ABNORMAL /HPF
WBC NRBC COR # BLD: 6.19 10*3/MM3 (ref 3.4–10.8)
WHOLE BLOOD HOLD COAG: NORMAL
WHOLE BLOOD HOLD SPECIMEN: NORMAL

## 2022-09-20 PROCEDURE — 85025 COMPLETE CBC W/AUTO DIFF WBC: CPT

## 2022-09-20 PROCEDURE — 81001 URINALYSIS AUTO W/SCOPE: CPT | Performed by: EMERGENCY MEDICINE

## 2022-09-20 PROCEDURE — 84484 ASSAY OF TROPONIN QUANT: CPT | Performed by: EMERGENCY MEDICINE

## 2022-09-20 PROCEDURE — 99284 EMERGENCY DEPT VISIT MOD MDM: CPT

## 2022-09-20 PROCEDURE — 96375 TX/PRO/DX INJ NEW DRUG ADDON: CPT

## 2022-09-20 PROCEDURE — 93005 ELECTROCARDIOGRAM TRACING: CPT | Performed by: EMERGENCY MEDICINE

## 2022-09-20 PROCEDURE — 25010000002 METOCLOPRAMIDE PER 10 MG: Performed by: EMERGENCY MEDICINE

## 2022-09-20 PROCEDURE — 83735 ASSAY OF MAGNESIUM: CPT | Performed by: EMERGENCY MEDICINE

## 2022-09-20 PROCEDURE — 83605 ASSAY OF LACTIC ACID: CPT | Performed by: EMERGENCY MEDICINE

## 2022-09-20 PROCEDURE — 96374 THER/PROPH/DIAG INJ IV PUSH: CPT

## 2022-09-20 PROCEDURE — 71045 X-RAY EXAM CHEST 1 VIEW: CPT

## 2022-09-20 PROCEDURE — 80053 COMPREHEN METABOLIC PANEL: CPT | Performed by: EMERGENCY MEDICINE

## 2022-09-20 PROCEDURE — 93005 ELECTROCARDIOGRAM TRACING: CPT

## 2022-09-20 PROCEDURE — 25010000002 ONDANSETRON PER 1 MG: Performed by: EMERGENCY MEDICINE

## 2022-09-20 PROCEDURE — 74176 CT ABD & PELVIS W/O CONTRAST: CPT

## 2022-09-20 PROCEDURE — 25010000002 HYDRALAZINE PER 20 MG: Performed by: EMERGENCY MEDICINE

## 2022-09-20 PROCEDURE — 25010000002 DIPHENHYDRAMINE PER 50 MG: Performed by: EMERGENCY MEDICINE

## 2022-09-20 PROCEDURE — 81001 URINALYSIS AUTO W/SCOPE: CPT

## 2022-09-20 RX ORDER — DIPHENHYDRAMINE HYDROCHLORIDE 50 MG/ML
25 INJECTION INTRAMUSCULAR; INTRAVENOUS ONCE
Status: COMPLETED | OUTPATIENT
Start: 2022-09-20 | End: 2022-09-20

## 2022-09-20 RX ORDER — SODIUM CHLORIDE 0.9 % (FLUSH) 0.9 %
10 SYRINGE (ML) INJECTION AS NEEDED
Status: DISCONTINUED | OUTPATIENT
Start: 2022-09-20 | End: 2022-09-21 | Stop reason: HOSPADM

## 2022-09-20 RX ORDER — DEXTROSE MONOHYDRATE 25 G/50ML
12.5 INJECTION, SOLUTION INTRAVENOUS ONCE
Status: COMPLETED | OUTPATIENT
Start: 2022-09-20 | End: 2022-09-20

## 2022-09-20 RX ORDER — HYDRALAZINE HYDROCHLORIDE 20 MG/ML
10 INJECTION INTRAMUSCULAR; INTRAVENOUS ONCE
Status: COMPLETED | OUTPATIENT
Start: 2022-09-20 | End: 2022-09-20

## 2022-09-20 RX ORDER — HYDROMORPHONE HYDROCHLORIDE 1 MG/ML
0.5 INJECTION, SOLUTION INTRAMUSCULAR; INTRAVENOUS; SUBCUTANEOUS
Status: DISCONTINUED | OUTPATIENT
Start: 2022-09-20 | End: 2022-09-21 | Stop reason: HOSPADM

## 2022-09-20 RX ORDER — LABETALOL HYDROCHLORIDE 5 MG/ML
10 INJECTION, SOLUTION INTRAVENOUS ONCE
Status: COMPLETED | OUTPATIENT
Start: 2022-09-20 | End: 2022-09-20

## 2022-09-20 RX ORDER — AMLODIPINE BESYLATE 5 MG/1
5 TABLET ORAL ONCE
Status: COMPLETED | OUTPATIENT
Start: 2022-09-20 | End: 2022-09-20

## 2022-09-20 RX ORDER — DIPHENHYDRAMINE HCL 25 MG
25 TABLET ORAL EVERY 6 HOURS PRN
Qty: 12 TABLET | Refills: 0 | Status: SHIPPED | OUTPATIENT
Start: 2022-09-20

## 2022-09-20 RX ORDER — METOCLOPRAMIDE HYDROCHLORIDE 5 MG/ML
10 INJECTION INTRAMUSCULAR; INTRAVENOUS ONCE
Status: COMPLETED | OUTPATIENT
Start: 2022-09-20 | End: 2022-09-20

## 2022-09-20 RX ORDER — ONDANSETRON 2 MG/ML
4 INJECTION INTRAMUSCULAR; INTRAVENOUS ONCE
Status: COMPLETED | OUTPATIENT
Start: 2022-09-20 | End: 2022-09-20

## 2022-09-20 RX ORDER — METOCLOPRAMIDE 5 MG/1
5 TABLET ORAL 3 TIMES DAILY PRN
Qty: 8 TABLET | Refills: 0 | Status: ON HOLD | OUTPATIENT
Start: 2022-09-20 | End: 2022-12-12 | Stop reason: SDUPTHER

## 2022-09-20 RX ADMIN — AMLODIPINE BESYLATE 5 MG: 5 TABLET ORAL at 22:27

## 2022-09-20 RX ADMIN — DEXTROSE MONOHYDRATE 12.5 G: 25 INJECTION, SOLUTION INTRAVENOUS at 21:12

## 2022-09-20 RX ADMIN — LABETALOL HYDROCHLORIDE 10 MG: 5 INJECTION, SOLUTION INTRAVENOUS at 21:11

## 2022-09-20 RX ADMIN — SODIUM CHLORIDE 1000 ML: 9 INJECTION, SOLUTION INTRAVENOUS at 19:44

## 2022-09-20 RX ADMIN — HYDRALAZINE HYDROCHLORIDE 10 MG: 20 INJECTION INTRAMUSCULAR; INTRAVENOUS at 21:11

## 2022-09-20 RX ADMIN — METOCLOPRAMIDE 10 MG: 5 INJECTION, SOLUTION INTRAMUSCULAR; INTRAVENOUS at 19:46

## 2022-09-20 RX ADMIN — DIPHENHYDRAMINE HYDROCHLORIDE 25 MG: 50 INJECTION INTRAMUSCULAR; INTRAVENOUS at 19:46

## 2022-09-20 RX ADMIN — ONDANSETRON 4 MG: 2 INJECTION INTRAMUSCULAR; INTRAVENOUS at 19:46

## 2022-09-20 NOTE — ED PROVIDER NOTES
Subjective   History of Present Illness  Patient is a very pleasant 61-year-old female with a history of gastroparesis.  She presents with 2 weeks of nausea, vomiting, and abdominal discomfort.  She had 2 small episodes of diarrhea/stools.  These diarrhea episodes were not today.  She estimates that she is either vomited or had dry heaves approximately 20 times in the past 24 hours.  She has a scheduled placement of a gastric pacemaker removal in the upcoming future.  Symptoms today are consistent with her history of gastroparesis and her previous episodes but she feels that the pain today in her upper abdomen is more pronounced than it has been in the past.    Denies fever, chills, chest pain, shortness of breath, blood in her stool or emesis, or other acute complaints.        Review of Systems   All other systems reviewed and are negative.      Past Medical History:   Diagnosis Date   • Acid reflux    • Acute bronchitis    • Cardiac murmur    • Diabetes mellitus (HCC)    • H/O echocardiogram 08/07/2012    i. LVEF 65%.ii. Mild LVH.iii. Borderline evidence of atrial septal aneurysm.  No PFO.    • History of nuclear stress test 08/22/2014    Negative for ischemia and scars; LVEF 77%.     • Hyperlipidemia    • Hypertension    • Impacted cerumen of both ears    • Migraine    • Self-catheterizes urinary bladder    • Sinusitis    • Stroke (HCC)    • Tobacco abuse     quit 4 days ago.     • Urticaria        No Known Allergies    Past Surgical History:   Procedure Laterality Date   • CAPSULE ENDOSCOPY  07/27/2021    Procedure: PILLCAM DEPLOYMENT;  Surgeon: Mikael Worthy MD;  Location:  [facility];  Service: Gastroenterology;;   • COLONOSCOPY     • COLONOSCOPY N/A 07/27/2021    Procedure: COLONOSCOPY;  Surgeon: Mikael Worthy MD;  Location:  [facility];  Service: Gastroenterology;  Laterality: N/A;   • DENTAL PROCEDURE     • ENDOSCOPY N/A 06/30/2021    Procedure: ESOPHAGOGASTRODUODENOSCOPY;  Surgeon:  Brunner, Mark I, MD;  Location:  [facility];  Service: Gastroenterology;  Laterality: N/A;   • ENDOSCOPY N/A 07/27/2021    Procedure: ESOPHAGOGASTRODUODENOSCOPY;  Surgeon: Mikael Worthy MD;  Location:  [facility];  Service: Gastroenterology;  Laterality: N/A;   • ENDOSCOPY WITH JTUBE N/A 03/16/2022    Procedure: ESOPHAGOGASTRODUODENOSCOPY WITH JEJUNAL TUBE INSERTION;  Surgeon: Thom Glover MD;  Location:  [facility];  Service: Gastroenterology;  Laterality: N/A;   • NO PAST SURGERIES     • UPPER GASTROINTESTINAL ENDOSCOPY         Family History   Problem Relation Age of Onset   • Anxiety disorder Other    • Arthritis Other    • ADD / ADHD Other    • Heart disease Other         cardiac disorder   • Depression Other    • Diabetes Other    • Hyperlipidemia Other    • Hypertension Other    • Lung cancer Other    • Osteoporosis Other    • Hypertension Brother    • Diabetes Brother    • Hyperlipidemia Mother    • Hypertension Mother    • Colon cancer Neg Hx        Social History     Socioeconomic History   • Marital status:    Tobacco Use   • Smoking status: Former Smoker     Packs/day: 0.25     Years: 30.00     Pack years: 7.50     Types: Cigarettes     Quit date: 5/28/2019     Years since quitting: 3.3   • Smokeless tobacco: Never Used   • Tobacco comment: BC PL never smoker    Vaping Use   • Vaping Use: Never used   Substance and Sexual Activity   • Alcohol use: Yes     Alcohol/week: 1.0 standard drink     Types: 1 Glasses of wine per week     Comment: ocassional   • Drug use: No   • Sexual activity: Not Currently     Comment: Single            Objective   Physical Exam  Vitals and nursing note reviewed.   Constitutional:       General: She is not in acute distress.  HENT:      Head: Normocephalic and atraumatic.   Eyes:      Conjunctiva/sclera: Conjunctivae normal.      Pupils: Pupils are equal, round, and reactive to light.   Cardiovascular:      Rate and Rhythm: Normal rate and  regular rhythm.      Heart sounds: Normal heart sounds.   Pulmonary:      Effort: Pulmonary effort is normal. No respiratory distress.      Breath sounds: Normal breath sounds.   Abdominal:      General: Bowel sounds are normal. There is no distension.      Palpations: Abdomen is soft. There is no mass.      Tenderness: There is abdominal tenderness. There is no rebound.   Musculoskeletal:         General: Normal range of motion.      Cervical back: Normal range of motion and neck supple.   Skin:     General: Skin is warm and dry.      Capillary Refill: Capillary refill takes less than 2 seconds.   Neurological:      Mental Status: She is alert and oriented to person, place, and time.   Psychiatric:         Behavior: Behavior normal.         Thought Content: Thought content normal.         Procedures           ED Course  ED Course as of 09/27/22 0543   Tue Sep 20, 2022   2216 Patient feels much better following treatment.  I will give her Reglan to take scheduled every 6 hours for the next 2 days.  Hopefully this will better over the process is currently presenting.  She is tolerating oral fluids and very comfortable and happy with discharge at this time.  She has reliable outpatient follow-up    Patient was significantly hypertensive here in the emergency department.  She states that she is unable to keep down her hypertension medications today and this is likely the cause.  Slightly correct.  Now that she is keeping down oral fluids and medications I will give her a dose of amlodipine prior to discharge.  She will then begin taking her normal medicines in the morning. [CP]      ED Course User Index  [CP] Abisai Pierson DO            Latest Reference Range & Units 09/20/22 16:21 09/20/22 16:28 09/20/22 18:18 09/20/22 18:20 09/20/22 20:48   Troponin T 0.000 - 0.030 ng/mL   0.020     Glucose 65 - 99 mg/dL   78     Sodium 136 - 145 mmol/L   144     Potassium 3.5 - 5.2 mmol/L   3.5     CO2 22.0 - 29.0 mmol/L   24.0      Chloride 98 - 107 mmol/L   99     Anion Gap 5.0 - 15.0 mmol/L   21.0 (H)     Creatinine 0.57 - 1.00 mg/dL   1.70 (H)     BUN 8 - 23 mg/dL   31 (H)     BUN/Creatinine Ratio 7.0 - 25.0    18.2     Calcium 8.6 - 10.5 mg/dL   10.1     eGFR >60.0 mL/min/1.73   33.6 (L)     Alkaline Phosphatase 39 - 117 U/L   93     Total Protein 6.0 - 8.5 g/dL   8.5     ALT (SGPT) 1 - 33 U/L   13     AST (SGOT) 1 - 32 U/L   19     Total Bilirubin 0.0 - 1.2 mg/dL   0.6     Albumin 3.50 - 5.20 g/dL   5.00     Globulin gm/dL   3.5     A/G Ratio g/dL   1.4     Lactate 0.5 - 2.0 mmol/L    1.6    Magnesium 1.6 - 2.4 mg/dL   2.1     WBC 3.40 - 10.80 10*3/mm3 6.19       RBC 3.77 - 5.28 10*6/mm3 4.61       Hemoglobin 12.0 - 15.9 g/dL 14.1       Hematocrit 34.0 - 46.6 % 41.4       RDW 12.3 - 15.4 % 12.8       MCV 79.0 - 97.0 fL 89.8       MCH 26.6 - 33.0 pg 30.6       MCHC 31.5 - 35.7 g/dL 34.1       MPV 6.0 - 12.0 fL 10.3       Platelets 140 - 450 10*3/mm3 381       RDW-SD 37.0 - 54.0 fl 42.0       Neutrophil Rel % 42.7 - 76.0 % 62.7       Lymphocyte Rel % 19.6 - 45.3 % 31.2       Monocyte Rel % 5.0 - 12.0 % 5.2       Eosinophil Rel % 0.3 - 6.2 % 0.3       Basophil Rel % 0.0 - 1.5 % 0.3       Immature Granulocyte Rel % 0.0 - 0.5 % 0.3       Neutrophils Absolute 1.70 - 7.00 10*3/mm3 3.88       Lymphocytes Absolute 0.70 - 3.10 10*3/mm3 1.93       Monocytes Absolute 0.10 - 0.90 10*3/mm3 0.32       Eosinophils Absolute 0.00 - 0.40 10*3/mm3 0.02       Basophils Absolute 0.00 - 0.20 10*3/mm3 0.02       Immature Grans, Absolute 0.00 - 0.05 10*3/mm3 0.02       nRBC 0.0 - 0.2 /100 WBC 0.0       Color, UA Yellow, Straw   Yellow   Yellow   Appearance, UA Clear   Cloudy !   Clear   Specific Gravity, UA 1.001 - 1.030   1.019   1.017   pH, UA 5.0 - 8.0   5.5   5.5   Glucose Negative   Negative   Negative   Ketones, UA Negative   15 mg/dL (1+) !   15 mg/dL (1+) !   Blood, UA Negative   Negative   Negative   Nitrite, UA Negative   Negative   Negative    Leukocytes, UA Negative   Trace !   Negative   Protein, UA Negative   100 mg/dL (2+) !   100 mg/dL (2+) !   Bilirubin, UA Negative   Negative   Negative   Urobilinogen, UA 0.2 - 1.0 E.U./dL   0.2 E.U./dL   0.2 E.U./dL   RBC, UA None Seen, 0-2 /HPF  3-6 !   3-6 !   WBC, UA None Seen, 0-2 /HPF  6-12 !   3-5 !   Bacteria, UA None Seen, Trace /HPF  Trace   None Seen   Squamous Epithelial Cells, UA None Seen, 0-2 /HPF  21-30 !   3-6 !   Hyaline Casts, UA 0 - 6 /LPF  Too Numerous to Count   Too Numerous to Count   Methodology:   Manual Light Microscopy   Manual Light Microscopy   (H): Data is abnormally high  (L): Data is abnormally low  !: Data is abnormal                      CT Abdomen Pelvis Without Contrast    Result Date: 9/20/2022  Examination: CT ABDOMEN PELVIS WO CONTRAST-  Date of Exam: 9/20/2022 8:01 PM  Indication: abdominal pain, nausea, vomiting.  Comparison: 06/29/2022.  Technique: Noncontrast axial volumetric CT imaging of the abdomen was performed. Automated exposure control and iterative reconstruction methods were used.  Findings: Lung bases are clear.  Distal esophagus is unremarkable.  The heart size is normal.  Subcutaneous fat and underlying musculature appear within normal limits.  There are no acute osseous abnormalities or destructive bone lesions. There are mild lower lumbar degenerative changes.  The liver is homogeneous. The gallbladder, bile ducts, pancreas, pancreatic duct, spleen, stomach and kidneys appear within normal limits. There is stable low attenuation thickening of both adrenal glands, likely hyperplasia, less likely adenomas. There is mild aortoiliac atherosclerotic disease. The appendix is normal. The small bowel is nondistended. There is minimal distal colonic diverticulosis. There is no ascites, pneumoperitoneum or lymphadenopathy. There is an abnormal configuration of the urinary bladder with a focal outpouching at the dome likely reflecting a large diverticulum measuring 45  mm diameter.       1. No acute intra-abdominal or pelvic abnormality. 2. Stable urinary bladder diverticulum at the dome. 3. There is stable low attenuation thickening of the adrenal glands, likely due to hyperplasia or adenomas.  This report was finalized on 9/20/2022 9:14 PM by Kavon Rush MD.      XR Chest 1 View    Result Date: 9/20/2022  Examination: XR CHEST 1 VW-  Date of Exam: 9/20/2022 5:10 PM  Indication: Weak/Dizzy/AMS triage protocol.  Comparison: 06/29/2022.  Technique: Single radiographic view of the chest was obtained.  Findings: There is no pneumothorax, pleural effusion or focal airspace consolidation. Cardiomediastinal silhouette is unremarkable. Pulmonary vasculature appears within normal limits. Regional bones appear grossly intact.      No acute cardiopulmonary abnormality.  This report was finalized on 9/20/2022 5:24 PM by Kavon Rush MD.              [facility]    Final diagnoses:   None       ED Disposition  ED Disposition     None          No follow-up provider specified.       Medication List      No changes were made to your prescriptions during this visit.          Abisai Pierson,   09/27/22 0545

## 2022-09-20 NOTE — TELEPHONE ENCOUNTER
Rx Refill Note  Requested Prescriptions     Pending Prescriptions Disp Refills   • hydroCHLOROthiazide (HYDRODIURIL) 12.5 MG tablet [Pharmacy Med Name: hydroCHLOROthiazide 12.5 MG Oral Tablet] 30 tablet 0     Sig: Take 1 tablet by mouth once daily   • pantoprazole (PROTONIX) 40 MG EC tablet [Pharmacy Med Name: Pantoprazole Sodium 40 MG Oral Tablet Delayed Release] 30 tablet 0     Sig: Take 1 tablet by mouth once daily      Last office visit with prescribing clinician: 4/14/2022      Next office visit with prescribing clinician: Visit date not found            Pascual Cox MA  09/20/22, 08:15 EDT

## 2022-09-21 RX ORDER — PANTOPRAZOLE SODIUM 40 MG/1
TABLET, DELAYED RELEASE ORAL
Qty: 30 TABLET | Refills: 5 | Status: SHIPPED | OUTPATIENT
Start: 2022-09-21 | End: 2023-01-20 | Stop reason: SDUPTHER

## 2022-09-21 RX ORDER — HYDROCHLOROTHIAZIDE 12.5 MG/1
TABLET ORAL
Qty: 30 TABLET | Refills: 0 | OUTPATIENT
Start: 2022-09-21

## 2022-09-27 LAB
QT INTERVAL: 376 MS
QTC INTERVAL: 472 MS

## 2022-10-19 DIAGNOSIS — M19.90 OSTEOARTHRITIS, UNSPECIFIED OSTEOARTHRITIS TYPE, UNSPECIFIED SITE: ICD-10-CM

## 2022-10-19 RX ORDER — TRAMADOL HYDROCHLORIDE 50 MG/1
TABLET ORAL
Qty: 28 TABLET | Refills: 0 | OUTPATIENT
Start: 2022-10-19

## 2022-11-08 ENCOUNTER — TELEPHONE (OUTPATIENT)
Dept: INTERNAL MEDICINE | Facility: CLINIC | Age: 62
End: 2022-11-08

## 2022-11-12 DIAGNOSIS — E43 SEVERE MALNUTRITION: ICD-10-CM

## 2022-11-12 DIAGNOSIS — F51.01 PRIMARY INSOMNIA: ICD-10-CM

## 2022-11-14 RX ORDER — DOXAZOSIN MESYLATE 1 MG/1
TABLET ORAL
Qty: 90 TABLET | Refills: 0 | Status: ON HOLD | OUTPATIENT
Start: 2022-11-14 | End: 2022-12-12 | Stop reason: SDUPTHER

## 2022-11-14 RX ORDER — MIRTAZAPINE 15 MG/1
TABLET, FILM COATED ORAL
Qty: 90 TABLET | Refills: 0 | Status: SHIPPED | OUTPATIENT
Start: 2022-11-14 | End: 2023-02-06 | Stop reason: HOSPADM

## 2022-11-14 RX ORDER — PNV NO.95/FERROUS FUM/FOLIC AC 28MG-0.8MG
TABLET ORAL
Qty: 30 TABLET | Refills: 0 | Status: SHIPPED | OUTPATIENT
Start: 2022-11-14 | End: 2023-01-11

## 2022-12-06 ENCOUNTER — HOSPITAL ENCOUNTER (INPATIENT)
Facility: HOSPITAL | Age: 62
LOS: 5 days | Discharge: HOME | End: 2022-12-12
Attending: EMERGENCY MEDICINE | Admitting: STUDENT IN AN ORGANIZED HEALTH CARE EDUCATION/TRAINING PROGRAM

## 2022-12-06 DIAGNOSIS — R11.2 NAUSEA AND VOMITING, UNSPECIFIED VOMITING TYPE: Primary | ICD-10-CM

## 2022-12-06 DIAGNOSIS — I48.91 ATRIAL FIBRILLATION AND FLUTTER: ICD-10-CM

## 2022-12-06 DIAGNOSIS — I48.92 ATRIAL FIBRILLATION AND FLUTTER: ICD-10-CM

## 2022-12-06 DIAGNOSIS — Z86.39 HISTORY OF DIABETIC GASTROPARESIS: ICD-10-CM

## 2022-12-06 DIAGNOSIS — R73.9 HYPERGLYCEMIA: ICD-10-CM

## 2022-12-06 PROCEDURE — 99285 EMERGENCY DEPT VISIT HI MDM: CPT

## 2022-12-06 PROCEDURE — 36415 COLL VENOUS BLD VENIPUNCTURE: CPT

## 2022-12-06 PROCEDURE — 93005 ELECTROCARDIOGRAM TRACING: CPT | Performed by: EMERGENCY MEDICINE

## 2022-12-06 PROCEDURE — 87636 SARSCOV2 & INF A&B AMP PRB: CPT | Performed by: EMERGENCY MEDICINE

## 2022-12-07 ENCOUNTER — APPOINTMENT (OUTPATIENT)
Dept: GENERAL RADIOLOGY | Facility: HOSPITAL | Age: 62
End: 2022-12-07

## 2022-12-07 PROBLEM — R11.2 NAUSEA AND VOMITING, UNSPECIFIED VOMITING TYPE: Status: ACTIVE | Noted: 2022-12-07

## 2022-12-07 LAB
ALBUMIN SERPL-MCNC: 4.7 G/DL (ref 3.5–5.2)
ALBUMIN/GLOB SERPL: 1.2 G/DL
ALP SERPL-CCNC: 111 U/L (ref 39–117)
ALT SERPL W P-5'-P-CCNC: 14 U/L (ref 1–33)
ANION GAP SERPL CALCULATED.3IONS-SCNC: 13 MMOL/L (ref 5–15)
ANION GAP SERPL CALCULATED.3IONS-SCNC: 21 MMOL/L (ref 5–15)
AST SERPL-CCNC: 18 U/L (ref 1–32)
ATMOSPHERIC PRESS: ABNORMAL MM[HG]
B-OH-BUTYR SERPL-SCNC: 1.57 MMOL/L (ref 0.02–0.27)
BACTERIA UR QL AUTO: ABNORMAL /HPF
BASE EXCESS BLDV CALC-SCNC: -0.7 MMOL/L (ref -2–2)
BASOPHILS # BLD AUTO: 0.01 10*3/MM3 (ref 0–0.2)
BASOPHILS # BLD AUTO: 0.03 10*3/MM3 (ref 0–0.2)
BASOPHILS NFR BLD AUTO: 0.1 % (ref 0–1.5)
BASOPHILS NFR BLD AUTO: 0.2 % (ref 0–1.5)
BDY SITE: ABNORMAL
BILIRUB SERPL-MCNC: 0.6 MG/DL (ref 0–1.2)
BILIRUB UR QL STRIP: NEGATIVE
BODY TEMPERATURE: 37 C
BUN SERPL-MCNC: 22 MG/DL (ref 8–23)
BUN SERPL-MCNC: 23 MG/DL (ref 8–23)
BUN/CREAT SERPL: 16.4 (ref 7–25)
BUN/CREAT SERPL: 19.7 (ref 7–25)
CALCIUM SPEC-SCNC: 10.1 MG/DL (ref 8.6–10.5)
CALCIUM SPEC-SCNC: 9.6 MG/DL (ref 8.6–10.5)
CHLORIDE SERPL-SCNC: 107 MMOL/L (ref 98–107)
CHLORIDE SERPL-SCNC: 98 MMOL/L (ref 98–107)
CLARITY UR: CLEAR
CO2 BLDA-SCNC: 25.1 MMOL/L (ref 22–33)
CO2 SERPL-SCNC: 22 MMOL/L (ref 22–29)
CO2 SERPL-SCNC: 24 MMOL/L (ref 22–29)
COHGB MFR BLD: 1 %
COLOR UR: YELLOW
CREAT SERPL-MCNC: 1.17 MG/DL (ref 0.57–1)
CREAT SERPL-MCNC: 1.34 MG/DL (ref 0.57–1)
D-LACTATE SERPL-SCNC: 2 MMOL/L (ref 0.5–2)
D-LACTATE SERPL-SCNC: 2.3 MMOL/L (ref 0.5–2)
D-LACTATE SERPL-SCNC: 3.9 MMOL/L (ref 0.5–2)
D-LACTATE SERPL-SCNC: 5.6 MMOL/L (ref 0.5–2)
DEPRECATED RDW RBC AUTO: 41.3 FL (ref 37–54)
DEPRECATED RDW RBC AUTO: 41.6 FL (ref 37–54)
EGFRCR SERPLBLD CKD-EPI 2021: 44.6 ML/MIN/1.73
EGFRCR SERPLBLD CKD-EPI 2021: 52.5 ML/MIN/1.73
EOSINOPHIL # BLD AUTO: 0 10*3/MM3 (ref 0–0.4)
EOSINOPHIL # BLD AUTO: 0 10*3/MM3 (ref 0–0.4)
EOSINOPHIL NFR BLD AUTO: 0 % (ref 0.3–6.2)
EOSINOPHIL NFR BLD AUTO: 0 % (ref 0.3–6.2)
EPAP: 0
ERYTHROCYTE [DISTWIDTH] IN BLOOD BY AUTOMATED COUNT: 12.7 % (ref 12.3–15.4)
ERYTHROCYTE [DISTWIDTH] IN BLOOD BY AUTOMATED COUNT: 12.8 % (ref 12.3–15.4)
FLUAV SUBTYP SPEC NAA+PROBE: NOT DETECTED
FLUBV RNA ISLT QL NAA+PROBE: NOT DETECTED
GLOBULIN UR ELPH-MCNC: 3.8 GM/DL
GLUCOSE BLDC GLUCOMTR-MCNC: 125 MG/DL (ref 70–130)
GLUCOSE BLDC GLUCOMTR-MCNC: 205 MG/DL (ref 70–130)
GLUCOSE BLDC GLUCOMTR-MCNC: 239 MG/DL (ref 70–130)
GLUCOSE BLDC GLUCOMTR-MCNC: 246 MG/DL (ref 70–130)
GLUCOSE BLDC GLUCOMTR-MCNC: 438 MG/DL (ref 70–130)
GLUCOSE SERPL-MCNC: 252 MG/DL (ref 65–99)
GLUCOSE SERPL-MCNC: 350 MG/DL (ref 65–99)
GLUCOSE UR STRIP-MCNC: ABNORMAL MG/DL
HBA1C MFR BLD: 6.5 % (ref 4.8–5.6)
HCO3 BLDV-SCNC: 23.9 MMOL/L (ref 22–28)
HCT VFR BLD AUTO: 32.9 % (ref 34–46.6)
HCT VFR BLD AUTO: 39.2 % (ref 34–46.6)
HGB BLD-MCNC: 11.3 G/DL (ref 12–15.9)
HGB BLD-MCNC: 13.3 G/DL (ref 12–15.9)
HGB BLDA-MCNC: 12.5 G/DL (ref 14–18)
HGB UR QL STRIP.AUTO: NEGATIVE
HYALINE CASTS UR QL AUTO: ABNORMAL /LPF
IMM GRANULOCYTES # BLD AUTO: 0.04 10*3/MM3 (ref 0–0.05)
IMM GRANULOCYTES # BLD AUTO: 0.05 10*3/MM3 (ref 0–0.05)
IMM GRANULOCYTES NFR BLD AUTO: 0.4 % (ref 0–0.5)
IMM GRANULOCYTES NFR BLD AUTO: 0.4 % (ref 0–0.5)
INHALED O2 CONCENTRATION: 21 %
IPAP: 0
KETONES UR QL STRIP: ABNORMAL
LEUKOCYTE ESTERASE UR QL STRIP.AUTO: NEGATIVE
LIPASE SERPL-CCNC: 23 U/L (ref 13–60)
LYMPHOCYTES # BLD AUTO: 0.69 10*3/MM3 (ref 0.7–3.1)
LYMPHOCYTES # BLD AUTO: 0.75 10*3/MM3 (ref 0.7–3.1)
LYMPHOCYTES NFR BLD AUTO: 5.5 % (ref 19.6–45.3)
LYMPHOCYTES NFR BLD AUTO: 8 % (ref 19.6–45.3)
MAGNESIUM SERPL-MCNC: 1.7 MG/DL (ref 1.6–2.4)
MCH RBC QN AUTO: 30.2 PG (ref 26.6–33)
MCH RBC QN AUTO: 30.5 PG (ref 26.6–33)
MCHC RBC AUTO-ENTMCNC: 33.9 G/DL (ref 31.5–35.7)
MCHC RBC AUTO-ENTMCNC: 34.3 G/DL (ref 31.5–35.7)
MCV RBC AUTO: 88.9 FL (ref 79–97)
MCV RBC AUTO: 89.1 FL (ref 79–97)
METHGB BLD QL: 0.6 %
MODALITY: ABNORMAL
MONOCYTES # BLD AUTO: 0.26 10*3/MM3 (ref 0.1–0.9)
MONOCYTES # BLD AUTO: 0.29 10*3/MM3 (ref 0.1–0.9)
MONOCYTES NFR BLD AUTO: 2.1 % (ref 5–12)
MONOCYTES NFR BLD AUTO: 3.1 % (ref 5–12)
NEUTROPHILS NFR BLD AUTO: 11.52 10*3/MM3 (ref 1.7–7)
NEUTROPHILS NFR BLD AUTO: 8.34 10*3/MM3 (ref 1.7–7)
NEUTROPHILS NFR BLD AUTO: 88.4 % (ref 42.7–76)
NEUTROPHILS NFR BLD AUTO: 91.8 % (ref 42.7–76)
NITRITE UR QL STRIP: NEGATIVE
NOTE: ABNORMAL
NRBC BLD AUTO-RTO: 0 /100 WBC (ref 0–0.2)
NRBC BLD AUTO-RTO: 0 /100 WBC (ref 0–0.2)
OSMOLALITY SERPL: 318 MOSM/KG (ref 275–295)
OXYHGB MFR BLDV: 72.2 %
PAW @ PEAK INSP FLOW SETTING VENT: 0 CMH2O
PCO2 BLDV: 38.8 MM HG (ref 41–51)
PH BLDV: 7.4 PH UNITS (ref 7.31–7.41)
PH UR STRIP.AUTO: 7 [PH] (ref 5–8)
PLATELET # BLD AUTO: 330 10*3/MM3 (ref 140–450)
PLATELET # BLD AUTO: 385 10*3/MM3 (ref 140–450)
PMV BLD AUTO: 10 FL (ref 6–12)
PMV BLD AUTO: 10.3 FL (ref 6–12)
PO2 BLDV: 39.1 MM HG (ref 27–53)
POTASSIUM SERPL-SCNC: 3 MMOL/L (ref 3.5–5.2)
POTASSIUM SERPL-SCNC: 3.3 MMOL/L (ref 3.5–5.2)
PROCALCITONIN SERPL-MCNC: 0.07 NG/ML (ref 0–0.25)
PROT SERPL-MCNC: 8.5 G/DL (ref 6–8.5)
PROT UR QL STRIP: ABNORMAL
RBC # BLD AUTO: 3.7 10*6/MM3 (ref 3.77–5.28)
RBC # BLD AUTO: 4.4 10*6/MM3 (ref 3.77–5.28)
RBC # UR STRIP: ABNORMAL /HPF
REF LAB TEST METHOD: ABNORMAL
SARS-COV-2 RNA PNL SPEC NAA+PROBE: NOT DETECTED
SODIUM SERPL-SCNC: 141 MMOL/L (ref 136–145)
SODIUM SERPL-SCNC: 144 MMOL/L (ref 136–145)
SP GR UR STRIP: 1.02 (ref 1–1.03)
SQUAMOUS #/AREA URNS HPF: ABNORMAL /HPF
TOTAL RATE: 0 BREATHS/MINUTE
TROPONIN T SERPL-MCNC: <0.01 NG/ML (ref 0–0.03)
UROBILINOGEN UR QL STRIP: ABNORMAL
WBC # UR STRIP: ABNORMAL /HPF
WBC NRBC COR # BLD: 12.55 10*3/MM3 (ref 3.4–10.8)
WBC NRBC COR # BLD: 9.43 10*3/MM3 (ref 3.4–10.8)

## 2022-12-07 PROCEDURE — 84484 ASSAY OF TROPONIN QUANT: CPT | Performed by: EMERGENCY MEDICINE

## 2022-12-07 PROCEDURE — 25010000002 ONDANSETRON PER 1 MG: Performed by: NURSE PRACTITIONER

## 2022-12-07 PROCEDURE — 63710000001 INSULIN LISPRO (HUMAN) PER 5 UNITS: Performed by: INTERNAL MEDICINE

## 2022-12-07 PROCEDURE — 25010000002 METOCLOPRAMIDE PER 10 MG: Performed by: EMERGENCY MEDICINE

## 2022-12-07 PROCEDURE — 25010000002 METOCLOPRAMIDE PER 10 MG: Performed by: NURSE PRACTITIONER

## 2022-12-07 PROCEDURE — 63710000001 INSULIN LISPRO (HUMAN) PER 5 UNITS: Performed by: NURSE PRACTITIONER

## 2022-12-07 PROCEDURE — 80053 COMPREHEN METABOLIC PANEL: CPT | Performed by: EMERGENCY MEDICINE

## 2022-12-07 PROCEDURE — 99223 1ST HOSP IP/OBS HIGH 75: CPT | Performed by: INTERNAL MEDICINE

## 2022-12-07 PROCEDURE — 25010000002 HEPARIN (PORCINE) PER 1000 UNITS: Performed by: NURSE PRACTITIONER

## 2022-12-07 PROCEDURE — 83735 ASSAY OF MAGNESIUM: CPT | Performed by: NURSE PRACTITIONER

## 2022-12-07 PROCEDURE — 83036 HEMOGLOBIN GLYCOSYLATED A1C: CPT | Performed by: NURSE PRACTITIONER

## 2022-12-07 PROCEDURE — 85025 COMPLETE CBC W/AUTO DIFF WBC: CPT | Performed by: EMERGENCY MEDICINE

## 2022-12-07 PROCEDURE — 71045 X-RAY EXAM CHEST 1 VIEW: CPT

## 2022-12-07 PROCEDURE — 82962 GLUCOSE BLOOD TEST: CPT

## 2022-12-07 PROCEDURE — 83605 ASSAY OF LACTIC ACID: CPT | Performed by: NURSE PRACTITIONER

## 2022-12-07 PROCEDURE — 82010 KETONE BODYS QUAN: CPT | Performed by: EMERGENCY MEDICINE

## 2022-12-07 PROCEDURE — 81001 URINALYSIS AUTO W/SCOPE: CPT | Performed by: EMERGENCY MEDICINE

## 2022-12-07 PROCEDURE — 83930 ASSAY OF BLOOD OSMOLALITY: CPT | Performed by: NURSE PRACTITIONER

## 2022-12-07 PROCEDURE — 85025 COMPLETE CBC W/AUTO DIFF WBC: CPT | Performed by: NURSE PRACTITIONER

## 2022-12-07 PROCEDURE — 84145 PROCALCITONIN (PCT): CPT | Performed by: NURSE PRACTITIONER

## 2022-12-07 PROCEDURE — 82805 BLOOD GASES W/O2 SATURATION: CPT

## 2022-12-07 PROCEDURE — 83690 ASSAY OF LIPASE: CPT | Performed by: EMERGENCY MEDICINE

## 2022-12-07 RX ORDER — PANTOPRAZOLE SODIUM 40 MG/1
40 TABLET, DELAYED RELEASE ORAL DAILY
Status: DISCONTINUED | OUTPATIENT
Start: 2022-12-07 | End: 2022-12-12 | Stop reason: HOSPADM

## 2022-12-07 RX ORDER — FLUOXETINE HYDROCHLORIDE 20 MG/1
20 CAPSULE ORAL DAILY
Status: DISCONTINUED | OUTPATIENT
Start: 2022-12-07 | End: 2022-12-12 | Stop reason: HOSPADM

## 2022-12-07 RX ORDER — SODIUM CHLORIDE 0.9 % (FLUSH) 0.9 %
10 SYRINGE (ML) INJECTION EVERY 12 HOURS SCHEDULED
Status: DISCONTINUED | OUTPATIENT
Start: 2022-12-07 | End: 2022-12-12 | Stop reason: HOSPADM

## 2022-12-07 RX ORDER — HEPARIN SODIUM 5000 [USP'U]/ML
5000 INJECTION, SOLUTION INTRAVENOUS; SUBCUTANEOUS EVERY 8 HOURS SCHEDULED
Status: DISCONTINUED | OUTPATIENT
Start: 2022-12-07 | End: 2022-12-09

## 2022-12-07 RX ORDER — ATORVASTATIN CALCIUM 40 MG/1
80 TABLET, FILM COATED ORAL NIGHTLY
Status: DISCONTINUED | OUTPATIENT
Start: 2022-12-07 | End: 2022-12-12 | Stop reason: HOSPADM

## 2022-12-07 RX ORDER — AMOXICILLIN 250 MG
2 CAPSULE ORAL 2 TIMES DAILY
Status: DISCONTINUED | OUTPATIENT
Start: 2022-12-07 | End: 2022-12-12 | Stop reason: HOSPADM

## 2022-12-07 RX ORDER — DONEPEZIL HYDROCHLORIDE 10 MG/1
5 TABLET, FILM COATED ORAL NIGHTLY
Status: DISCONTINUED | OUTPATIENT
Start: 2022-12-07 | End: 2022-12-12 | Stop reason: HOSPADM

## 2022-12-07 RX ORDER — BISACODYL 5 MG/1
5 TABLET, DELAYED RELEASE ORAL DAILY PRN
Status: DISCONTINUED | OUTPATIENT
Start: 2022-12-07 | End: 2022-12-12 | Stop reason: HOSPADM

## 2022-12-07 RX ORDER — SODIUM CHLORIDE 0.9 % (FLUSH) 0.9 %
10 SYRINGE (ML) INJECTION AS NEEDED
Status: DISCONTINUED | OUTPATIENT
Start: 2022-12-07 | End: 2022-12-12 | Stop reason: HOSPADM

## 2022-12-07 RX ORDER — DROPERIDOL 2.5 MG/ML
2.5 INJECTION, SOLUTION INTRAMUSCULAR; INTRAVENOUS ONCE
Status: DISCONTINUED | OUTPATIENT
Start: 2022-12-07 | End: 2022-12-10

## 2022-12-07 RX ORDER — POLYETHYLENE GLYCOL 3350 17 G/17G
17 POWDER, FOR SOLUTION ORAL DAILY PRN
Status: DISCONTINUED | OUTPATIENT
Start: 2022-12-07 | End: 2022-12-12 | Stop reason: HOSPADM

## 2022-12-07 RX ORDER — METOCLOPRAMIDE HYDROCHLORIDE 5 MG/ML
5 INJECTION INTRAMUSCULAR; INTRAVENOUS
Status: DISCONTINUED | OUTPATIENT
Start: 2022-12-07 | End: 2022-12-08

## 2022-12-07 RX ORDER — TRAZODONE HYDROCHLORIDE 50 MG/1
50 TABLET ORAL NIGHTLY PRN
Status: DISCONTINUED | OUTPATIENT
Start: 2022-12-07 | End: 2022-12-12 | Stop reason: HOSPADM

## 2022-12-07 RX ORDER — MIRTAZAPINE 15 MG/1
15 TABLET, FILM COATED ORAL NIGHTLY
Status: DISCONTINUED | OUTPATIENT
Start: 2022-12-07 | End: 2022-12-12 | Stop reason: HOSPADM

## 2022-12-07 RX ORDER — AMLODIPINE BESYLATE 10 MG/1
10 TABLET ORAL DAILY
Status: DISCONTINUED | OUTPATIENT
Start: 2022-12-07 | End: 2022-12-08

## 2022-12-07 RX ORDER — INSULIN LISPRO 100 [IU]/ML
0-7 INJECTION, SOLUTION INTRAVENOUS; SUBCUTANEOUS
Status: DISCONTINUED | OUTPATIENT
Start: 2022-12-07 | End: 2022-12-12 | Stop reason: HOSPADM

## 2022-12-07 RX ORDER — ACETAMINOPHEN 160 MG/5ML
650 SOLUTION ORAL EVERY 4 HOURS PRN
Status: DISCONTINUED | OUTPATIENT
Start: 2022-12-07 | End: 2022-12-12 | Stop reason: HOSPADM

## 2022-12-07 RX ORDER — MULTIPLE VITAMINS W/ MINERALS TAB 9MG-400MCG
1 TAB ORAL DAILY
Status: DISCONTINUED | OUTPATIENT
Start: 2022-12-07 | End: 2022-12-10

## 2022-12-07 RX ORDER — POTASSIUM CHLORIDE 7.45 MG/ML
10 INJECTION INTRAVENOUS
Status: DISCONTINUED | OUTPATIENT
Start: 2022-12-07 | End: 2022-12-12 | Stop reason: HOSPADM

## 2022-12-07 RX ORDER — INSULIN LISPRO 100 [IU]/ML
7 INJECTION, SOLUTION INTRAVENOUS; SUBCUTANEOUS ONCE
Status: COMPLETED | OUTPATIENT
Start: 2022-12-07 | End: 2022-12-07

## 2022-12-07 RX ORDER — SODIUM CHLORIDE 9 MG/ML
40 INJECTION, SOLUTION INTRAVENOUS AS NEEDED
Status: DISCONTINUED | OUTPATIENT
Start: 2022-12-07 | End: 2022-12-12 | Stop reason: HOSPADM

## 2022-12-07 RX ORDER — DEXTROSE MONOHYDRATE 25 G/50ML
25 INJECTION, SOLUTION INTRAVENOUS
Status: DISCONTINUED | OUTPATIENT
Start: 2022-12-07 | End: 2022-12-12 | Stop reason: HOSPADM

## 2022-12-07 RX ORDER — POTASSIUM CHLORIDE 1.5 G/1.77G
40 POWDER, FOR SOLUTION ORAL AS NEEDED
Status: DISCONTINUED | OUTPATIENT
Start: 2022-12-07 | End: 2022-12-12 | Stop reason: HOSPADM

## 2022-12-07 RX ORDER — TERAZOSIN 1 MG/1
1 CAPSULE ORAL NIGHTLY
Status: DISCONTINUED | OUTPATIENT
Start: 2022-12-07 | End: 2022-12-12 | Stop reason: HOSPADM

## 2022-12-07 RX ORDER — SODIUM CHLORIDE 9 MG/ML
100 INJECTION, SOLUTION INTRAVENOUS CONTINUOUS
Status: DISCONTINUED | OUTPATIENT
Start: 2022-12-07 | End: 2022-12-10

## 2022-12-07 RX ORDER — NICOTINE POLACRILEX 4 MG
15 LOZENGE BUCCAL
Status: DISCONTINUED | OUTPATIENT
Start: 2022-12-07 | End: 2022-12-12 | Stop reason: HOSPADM

## 2022-12-07 RX ORDER — BISACODYL 10 MG
10 SUPPOSITORY, RECTAL RECTAL DAILY PRN
Status: DISCONTINUED | OUTPATIENT
Start: 2022-12-07 | End: 2022-12-12 | Stop reason: HOSPADM

## 2022-12-07 RX ORDER — POTASSIUM CHLORIDE 750 MG/1
40 CAPSULE, EXTENDED RELEASE ORAL AS NEEDED
Status: DISCONTINUED | OUTPATIENT
Start: 2022-12-07 | End: 2022-12-12 | Stop reason: HOSPADM

## 2022-12-07 RX ORDER — ONDANSETRON 2 MG/ML
4 INJECTION INTRAMUSCULAR; INTRAVENOUS EVERY 6 HOURS PRN
Status: DISCONTINUED | OUTPATIENT
Start: 2022-12-07 | End: 2022-12-07 | Stop reason: SDUPTHER

## 2022-12-07 RX ORDER — CHOLECALCIFEROL (VITAMIN D3) 125 MCG
5 CAPSULE ORAL NIGHTLY PRN
Status: DISCONTINUED | OUTPATIENT
Start: 2022-12-07 | End: 2022-12-12 | Stop reason: HOSPADM

## 2022-12-07 RX ORDER — ASPIRIN 81 MG/1
81 TABLET ORAL DAILY
Status: DISCONTINUED | OUTPATIENT
Start: 2022-12-07 | End: 2022-12-12

## 2022-12-07 RX ORDER — ACETAMINOPHEN 325 MG/1
650 TABLET ORAL EVERY 4 HOURS PRN
Status: DISCONTINUED | OUTPATIENT
Start: 2022-12-07 | End: 2022-12-12 | Stop reason: HOSPADM

## 2022-12-07 RX ORDER — METOCLOPRAMIDE HYDROCHLORIDE 5 MG/ML
10 INJECTION INTRAMUSCULAR; INTRAVENOUS ONCE
Status: COMPLETED | OUTPATIENT
Start: 2022-12-07 | End: 2022-12-07

## 2022-12-07 RX ORDER — ACETAMINOPHEN 650 MG/1
650 SUPPOSITORY RECTAL EVERY 4 HOURS PRN
Status: DISCONTINUED | OUTPATIENT
Start: 2022-12-07 | End: 2022-12-12 | Stop reason: HOSPADM

## 2022-12-07 RX ORDER — ONDANSETRON 2 MG/ML
4 INJECTION INTRAMUSCULAR; INTRAVENOUS EVERY 6 HOURS PRN
Status: DISCONTINUED | OUTPATIENT
Start: 2022-12-07 | End: 2022-12-09

## 2022-12-07 RX ADMIN — POTASSIUM CHLORIDE 40 MEQ: 750 CAPSULE, EXTENDED RELEASE ORAL at 22:07

## 2022-12-07 RX ADMIN — DOCUSATE SODIUM 50 MG AND SENNOSIDES 8.6 MG 2 TABLET: 8.6; 5 TABLET, FILM COATED ORAL at 13:13

## 2022-12-07 RX ADMIN — INSULIN LISPRO 3 UNITS: 100 INJECTION, SOLUTION INTRAVENOUS; SUBCUTANEOUS at 17:15

## 2022-12-07 RX ADMIN — DONEPEZIL HYDROCHLORIDE 5 MG: 10 TABLET, FILM COATED ORAL at 21:51

## 2022-12-07 RX ADMIN — DOCUSATE SODIUM 50 MG AND SENNOSIDES 8.6 MG 2 TABLET: 8.6; 5 TABLET, FILM COATED ORAL at 21:50

## 2022-12-07 RX ADMIN — ASPIRIN 81 MG: 81 TABLET, COATED ORAL at 12:16

## 2022-12-07 RX ADMIN — HEPARIN SODIUM 5000 UNITS: 5000 INJECTION INTRAVENOUS; SUBCUTANEOUS at 13:14

## 2022-12-07 RX ADMIN — SODIUM CHLORIDE 100 ML/HR: 9 INJECTION, SOLUTION INTRAVENOUS at 14:42

## 2022-12-07 RX ADMIN — INSULIN LISPRO 3 UNITS: 100 INJECTION, SOLUTION INTRAVENOUS; SUBCUTANEOUS at 12:24

## 2022-12-07 RX ADMIN — INSULIN LISPRO 7 UNITS: 100 INJECTION, SOLUTION INTRAVENOUS; SUBCUTANEOUS at 06:05

## 2022-12-07 RX ADMIN — Medication 10 ML: at 21:52

## 2022-12-07 RX ADMIN — ONDANSETRON 4 MG: 2 INJECTION INTRAMUSCULAR; INTRAVENOUS at 18:45

## 2022-12-07 RX ADMIN — SODIUM CHLORIDE 1000 ML: 9 INJECTION, SOLUTION INTRAVENOUS at 02:18

## 2022-12-07 RX ADMIN — AMLODIPINE BESYLATE 10 MG: 10 TABLET ORAL at 12:16

## 2022-12-07 RX ADMIN — NICARDIPINE HYDROCHLORIDE 5 MG/HR: 25 INJECTION, SOLUTION INTRAVENOUS at 05:09

## 2022-12-07 RX ADMIN — FLUOXETINE 20 MG: 20 CAPSULE ORAL at 13:13

## 2022-12-07 RX ADMIN — SODIUM CHLORIDE 100 ML/HR: 9 INJECTION, SOLUTION INTRAVENOUS at 22:08

## 2022-12-07 RX ADMIN — Medication 10 ML: at 12:17

## 2022-12-07 RX ADMIN — PANTOPRAZOLE SODIUM 40 MG: 40 TABLET, DELAYED RELEASE ORAL at 12:17

## 2022-12-07 RX ADMIN — MIRTAZAPINE 15 MG: 15 TABLET, FILM COATED ORAL at 21:51

## 2022-12-07 RX ADMIN — POTASSIUM CHLORIDE 40 MEQ: 750 CAPSULE, EXTENDED RELEASE ORAL at 12:16

## 2022-12-07 RX ADMIN — SODIUM CHLORIDE 100 ML/HR: 9 INJECTION, SOLUTION INTRAVENOUS at 05:10

## 2022-12-07 RX ADMIN — METOCLOPRAMIDE 5 MG: 5 INJECTION, SOLUTION INTRAMUSCULAR; INTRAVENOUS at 21:50

## 2022-12-07 RX ADMIN — Medication 1 TABLET: at 13:13

## 2022-12-07 RX ADMIN — METOCLOPRAMIDE 5 MG: 5 INJECTION, SOLUTION INTRAMUSCULAR; INTRAVENOUS at 17:15

## 2022-12-07 RX ADMIN — ATORVASTATIN CALCIUM 80 MG: 40 TABLET, FILM COATED ORAL at 21:51

## 2022-12-07 RX ADMIN — METOCLOPRAMIDE 10 MG: 5 INJECTION, SOLUTION INTRAMUSCULAR; INTRAVENOUS at 04:11

## 2022-12-07 RX ADMIN — TRAZODONE HYDROCHLORIDE 50 MG: 50 TABLET ORAL at 22:08

## 2022-12-07 RX ADMIN — HEPARIN SODIUM 5000 UNITS: 5000 INJECTION INTRAVENOUS; SUBCUTANEOUS at 21:51

## 2022-12-07 RX ADMIN — TERAZOSIN HYDROCHLORIDE 1 MG: 1 CAPSULE ORAL at 21:52

## 2022-12-07 RX ADMIN — POTASSIUM CHLORIDE 40 MEQ: 750 CAPSULE, EXTENDED RELEASE ORAL at 17:15

## 2022-12-07 NOTE — H&P
[facility]  ADMISSION FOLLOW-UP NOTE          Patient admitted after midnight, H&P by my partner performed earlier on today's date reviewed.  Interim findings, labs, and charting also reviewed.        The [facility] Hospital Problem List has been managed and updated to include any new diagnoses:  Active Hospital Problems    Diagnosis  POA   • **Nausea and vomiting, unspecified vomiting type [R11.2]  Yes   • Elevated serum creatinine [R79.89]  Yes   • Leukocytosis [D72.829]  Yes   • Gastroparesis [K31.84]  Yes   • Uncontrolled type 1 diabetes mellitus with hyperglycemia (HCC) [E10.65]  Yes   • Nausea & vomiting [R11.2]  Yes   • Hypertensive urgency [I16.0]  Yes   • Personal history of cardiac murmur [Z86.79]  Not Applicable   • Hyperlipidemia [E78.5]  Yes   • Hypertension [I10]  Yes      Resolved Hospital Problems   No resolved problems to display.         ADDITIONAL PLAN:  - detailed assessment and plan from admission reviewed  - Patient admitted late this morning by Dr. Kendrick, chart reviewed  -Summary: This is a 60 y/o female w/ DM1 and gastroparesis s/p gastric stimulator followed by motility specialists in [place], [place] who presents with acute N/V/D and inability to keep home meds down    Assessment/Plan    DM gastroparesis s/p gastric stimulator w/ intractable N/V/D  -follows w/ motility clinic in [place]  -reglan  -prn antiemetics  -IVFs    HTN urgency  -cardene gtt  -home meds as able to tolerate    Impaired renal function  -likely pre-renal azotemia from n/v    DM type 1  -a1c pending  -follows w/ Dr. Ochoa (endo), has an insulin pump  -cont accuchecks w/ SSI    Tobacco use      Kavon Cramer, DO  12/07/22

## 2022-12-07 NOTE — CASE MANAGEMENT/SOCIAL WORK
Discharge Planning Assessment  [facility]     Patient Name: Thelma Michael  MRN: [id number]  Today's Date: 12/7/2022    Admit Date: 12/6/2022    Plan: home   Discharge Needs Assessment     Row Name 12/07/22 1450       Living Environment    People in Home child(bronson), adult    Name(s) of People in Home Son- Keshav Purcell    Current Living Arrangements apartment    Primary Care Provided by self    Provides Primary Care For no one    Family Caregiver if Needed child(bronson), adult    Quality of Family Relationships unable to assess    Able to Return to Prior Arrangements yes       Resource/Environmental Concerns    Resource/Environmental Concerns none       Transition Planning    Patient/Family Anticipates Transition to home with family    Transportation Anticipated family or friend will provide       Discharge Needs Assessment    Readmission Within the Last 30 Days no previous admission in last 30 days    Equipment Currently Used at Home glucometer    Concerns to be Addressed denies needs/concerns at this time    Equipment Needed After Discharge none    Discharge Coordination/Progress Pt lives in [place] with her adult son.  Pt stated she was independent prior to admission.  Her only DME is a glucometer and she did not have home health or OP therapy services in place.  Pt said her family will provide transportation at discharge.  Pt denies having any discharge needs at this time.               Discharge Plan     Row Name 12/07/22 1452       Plan    Plan home    Patient/Family in Agreement with Plan yes    Plan Comments SW met with pt at bedside; no family/friend was present.  Pt. denies having any discharge needs at this time and pt plans to return home when she is able.    Final Discharge Disposition Code 01 - home or self-care              Continued Care and Services - Admitted Since 12/6/2022    Coordination has not been started for this encounter.          Demographic Summary     Row Name 12/07/22 1447        General Information    Arrived From home    Preferred Language English    General Information Comments PCP is Monet Clark               Functional Status     Row Name 12/07/22 9579       Functional Status, IADL    Medications independent    Meal Preparation independent    Housekeeping independent    Laundry independent    Shopping independent       Employment/    Employment/ Comments Pt has insurance and prescription coverage through [employer].               Psychosocial    No documentation.                Abuse/Neglect    No documentation.                Legal    No documentation.                Substance Abuse    No documentation.                Patient Forms    No documentation.                   [employer]

## 2022-12-07 NOTE — ED PROVIDER NOTES
[place]    EMERGENCY DEPARTMENT ENCOUNTER      Pt Name: Thelma Michael  MRN: [id number]  YOB: 1960  Date of evaluation: 12/6/2022  Provider: Avtar Waite MD    CHIEF COMPLAINT       Chief Complaint   Patient presents with   • Nausea   • Vomiting   • Fatigue         HISTORY OF PRESENT ILLNESS  (Location/Symptom, Timing/Onset, Context/Setting, Quality, Duration, Modifying Factors, Severity.)   Thelma Michael is a 61 y.o. female who presents to the emergency department with ongoing vomiting and progressively worsening severe generalized weakness that is worse with exertion in setting of history of gastroparesis.  Patient states that this feels like her typical exacerbation of her gastroparesis.  She denies any associated abdominal pain, diarrhea, fever, chills, chest pain, shortness of breath.  No obvious inciting or modifying factors.      Nursing notes were reviewed.    REVIEW OF SYSTEMS    (2-9 systems for level 4, 10 or more for level 5)   ROS:  General: Weakness  Cardiovascular:  No chest pain, no palpitations  Respiratory:  No shortness of breath, no cough, no wheezing  Gastrointestinal: Vomiting  Musculoskeletal:  No muscle pain, no joint pain  Skin:  No rash  Neurologic:  No speech problems, no headache, no extremity numbness, no extremity tingling, no extremity weakness  Psychiatric:  No anxiety  Genitourinary:  No dysuria, no hematuria    Except as noted above the remainder of the review of systems was reviewed and negative.       PAST MEDICAL HISTORY     Past Medical History:   Diagnosis Date   • Acid reflux    • Acute bronchitis    • Cardiac murmur    • Diabetes mellitus (HCC)    • H/O echocardiogram 08/07/2012    i. LVEF 65%.ii. Mild LVH.iii. Borderline evidence of atrial septal aneurysm.  No PFO.    • History of nuclear stress test 08/22/2014    Negative for ischemia and scars; LVEF 77%.     • Hyperlipidemia    • Hypertension    • Impacted cerumen of both ears    •  Migraine    • Self-catheterizes urinary bladder    • Sinusitis    • Stroke (HCC)    • Tobacco abuse     quit 4 days ago.     • Urticaria          SURGICAL HISTORY       Past Surgical History:   Procedure Laterality Date   • CAPSULE ENDOSCOPY  07/27/2021    Procedure: PILLCAM DEPLOYMENT;  Surgeon: Mikael Worthy MD;  Location:  [facility];  Service: Gastroenterology;;   • COLONOSCOPY     • COLONOSCOPY N/A 07/27/2021    Procedure: COLONOSCOPY;  Surgeon: Mikael Worthy MD;  Location:  [facility];  Service: Gastroenterology;  Laterality: N/A;   • DENTAL PROCEDURE     • ENDOSCOPY N/A 06/30/2021    Procedure: ESOPHAGOGASTRODUODENOSCOPY;  Surgeon: Brunner, Mark I, MD;  Location:  [facility];  Service: Gastroenterology;  Laterality: N/A;   • ENDOSCOPY N/A 07/27/2021    Procedure: ESOPHAGOGASTRODUODENOSCOPY;  Surgeon: Mikael Worthy MD;  Location:  [facility];  Service: Gastroenterology;  Laterality: N/A;   • ENDOSCOPY WITH JTUBE N/A 03/16/2022    Procedure: ESOPHAGOGASTRODUODENOSCOPY WITH JEJUNAL TUBE INSERTION;  Surgeon: Thom Glover MD;  Location:  [facility];  Service: Gastroenterology;  Laterality: N/A;   • NO PAST SURGERIES     • UPPER GASTROINTESTINAL ENDOSCOPY           CURRENT MEDICATIONS       Current Facility-Administered Medications:   •  droperidol (INAPSINE) injection 2.5 mg, 2.5 mg, Intravenous, Once, Avtar Waite MD    Current Outpatient Medications:   •  acetaminophen (TYLENOL) 325 MG tablet, Take 2 tablets by mouth Every 4 (Four) Hours As Needed for Mild Pain ., Disp: , Rfl:   •  albuterol sulfate  (90 Base) MCG/ACT inhaler, Inhale 2 puffs Every 4 (Four) Hours As Needed for Wheezing., Disp: 18 g, Rfl: 5  •  amLODIPine (NORVASC) 10 MG tablet, Take 1 tablet by mouth Daily., Disp: 90 tablet, Rfl: 1  •  Ascorbic Acid (VITAMIN C PO), Vitamin C TABS, Disp: , Rfl:   •  aspirin 81 MG EC tablet, Take 1 tablet by mouth Daily., Disp: , Rfl:   •  atorvastatin  (LIPITOR) 80 MG tablet, Take 1 tablet by mouth Every Night., Disp: 90 tablet, Rfl: 1  •  Blood Glucose Monitoring Suppl (FreeStyle Lite) w/Device kit, USE UNIT TO CHECK GLUCOSE 4 TIMES DAILY, Disp: , Rfl:   •  calcium carbonate (TUMS) 500 MG chewable tablet, Chew 1,000 mg 3 (Three) Times a Day As Needed for Indigestion or Heartburn., Disp: , Rfl:   •  Continuous Blood Gluc  (Dexcom G6 ) device, 1 kit Every 3 (Three) Months., Disp: 1 each, Rfl: 0  •  Continuous Blood Gluc Sensor (Dexcom G6 Sensor), Every 10 (Ten) Days., Disp: 9 each, Rfl: 3  •  Continuous Blood Gluc Transmit (Dexcom G6 Transmitter) misc, 1 kit Every 3 (Three) Months., Disp: 1 each, Rfl: 3  •  diphenhydrAMINE (BENADRYL) 25 MG tablet, Take 1 tablet by mouth Every 6 (Six) Hours As Needed for Itching., Disp: 12 tablet, Rfl: 0  •  donepezil (Aricept) 5 MG tablet, Take 1 tablet by mouth Every Night., Disp: 30 tablet, Rfl: 1  •  doxazosin (CARDURA) 1 MG tablet, TAKE 1 TABLET BY MOUTH ONCE DAILY AT NIGHT, Disp: 90 tablet, Rfl: 0  •  estradiol (ESTRACE VAGINAL) 0.1 MG/GM vaginal cream, Insert 1 gm intravaginally twice weekly at bedtime., Disp: 42.5 g, Rfl: 4  •  ferrous sulfate 325 (65 Fe) MG tablet, TAKE 1 TABLET BY MOUTH ONCE DAILY WITH BREAKFAST. TAKE WITH ORANGE JUICE OR VITAMIN C, Disp: 30 tablet, Rfl: 0  •  FLUoxetine (PROzac) 20 MG capsule, Take 1 capsule by mouth Daily., Disp: 90 capsule, Rfl: 1  •  gabapentin (NEURONTIN) 400 MG capsule, Take 1 capsule by mouth 2 (Two) Times a Day As Needed (leg and foot pain)., Disp: 60 capsule, Rfl: 1  •  Gimoti 15 MG/ACT solution, , Disp: , Rfl:   •  glucose blood (Glucose Meter Test) test strip, Use as instructed to check blood glucose levels 3 times daily, Disp: 100 each, Rfl: 5  •  glucose blood test strip, Use as instructed, Disp: 200 each, Rfl: 12  •  glucose monitor monitoring kit, 1 each 4 (Four) Times a Day., Disp: 1 each, Rfl: 0  •  Insulin Pen Needle (BD Pen Needle Cydney U/F) 32G X 4 MM  misc, Take 1 each by mouth 4 (Four) Times a Day., Disp: 100 each, Rfl: 5  •  melatonin 5 MG tablet tablet, Take 1 tablet by mouth At Night As Needed (sleep)., Disp: , Rfl:   •  metoclopramide (REGLAN) 10 MG tablet, Take 1 tablet by mouth 4 (Four) Times a Day Before Meals & at Bedtime., Disp: 120 tablet, Rfl: 2  •  metoclopramide (REGLAN) 5 MG tablet, Take 1 tablet by mouth 3 (Three) Times a Day As Needed (vomiting)., Disp: 8 tablet, Rfl: 0  •  mirtazapine (REMERON) 15 MG tablet, TAKE 1 TABLET BY MOUTH ONCE DAILY AT NIGHT, Disp: 90 tablet, Rfl: 0  •  Multiple Vitamins-Minerals (Multivitamin-Minerals) tablet, Take 1 tablet by mouth Daily., Disp: , Rfl:   •  Multivitamin tablet tablet, Take 1 tablet by mouth Daily., Disp: , Rfl:   •  pantoprazole (PROTONIX) 40 MG EC tablet, Take 1 tablet by mouth once daily, Disp: 30 tablet, Rfl: 5  •  sennosides-docusate (senna-docusate sodium) 8.6-50 MG per tablet, Take 2 tablets by mouth 2 (Two) Times a Day As Needed for Constipation., Disp: 60 tablet, Rfl: 5  •  traMADol (ULTRAM) 50 MG tablet, Take 1 tablet by mouth Every 6 (Six) Hours As Needed for Moderate Pain ., Disp: 28 tablet, Rfl: 2  •  traZODone (DESYREL) 50 MG tablet, TAKE 1 TO 2 TABLETS BY MOUTH AT BEDTIME AS NEEDED FOR SLEEP, Disp: 45 tablet, Rfl: 0  •  vitamin D (ERGOCALCIFEROL) 1.25 MG (91940 UT) capsule capsule, Take 1 capsule by mouth once a week, Disp: 4 capsule, Rfl: 0    ALLERGIES     Patient has no known allergies.    FAMILY HISTORY       Family History   Problem Relation Age of Onset   • Anxiety disorder Other    • Arthritis Other    • ADD / ADHD Other    • Heart disease Other         cardiac disorder   • Depression Other    • Diabetes Other    • Hyperlipidemia Other    • Hypertension Other    • Lung cancer Other    • Osteoporosis Other    • Hypertension Brother    • Diabetes Brother    • Hyperlipidemia Mother    • Hypertension Mother    • Colon cancer Neg Hx           SOCIAL HISTORY       Social History  "    Socioeconomic History   • Marital status:    Tobacco Use   • Smoking status: Former     Packs/day: 0.25     Years: 30.00     Pack years: 7.50     Types: Cigarettes     Quit date: 5/28/2019     Years since quitting: 3.5   • Smokeless tobacco: Never   • Tobacco comments:     BC PL never smoker    Vaping Use   • Vaping Use: Never used   Substance and Sexual Activity   • Alcohol use: Yes     Alcohol/week: 1.0 standard drink     Types: 1 Glasses of wine per week     Comment: ocassional   • Drug use: No   • Sexual activity: Not Currently     Comment: Single          PHYSICAL EXAM    (up to 7 for level 4, 8 or more for level 5)     Vitals:    12/06/22 2302 12/06/22 2305 12/07/22 0214 12/07/22 0400   BP:  (!) 195/97 (!) 215/108 (!) 231/112   Pulse: 100  110 102   Resp: 18 22 19   Temp:  98.2 °F (36.8 °C)     TempSrc:  Oral     SpO2: 100%  97% 100%   Weight: 60.3 kg (133 lb)      Height: 172.7 cm (68\")          Physical Exam  General: Awake, alert, no acute distress.  HEENT: Conjunctivae normal.  Neck: Trachea midline.  Cardiac: Heart regular rate, rhythm, no murmurs, rubs, or gallops  Lungs: Lungs are clear to auscultation, there is no wheezing, rhonchi, or rales. There is no use of accessory muscles.  Chest wall: There is no tenderness to palpation over the chest wall or over ribs  Abdomen: Abdomen is soft, nontender, nondistended. There are no firm or pulsatile masses, no rebound rigidity or guarding.   Musculoskeletal: No deformity.  Neuro: Alert and oriented x 4.  Dermatology: Skin is warm and dry  Psych: Mentation is grossly normal, cognition is grossly normal. Affect is appropriate.        DIAGNOSTIC RESULTS     EKG: All EKGs are interpreted by the Emergency Department Physician who either signs or Co-signs this chart in the absence of a cardiologist.    ECG 12 Lead Tachycardia   Preliminary Result   Test Reason : Tachycardia   Blood Pressure :   */*   mmHG   Vent. Rate :  98 BPM     Atrial Rate :  98 BPM "      P-R Int : 150 ms          QRS Dur :  78 ms       QT Int : 372 ms       P-R-T Axes :  73  64  76 degrees      QTc Int : 474 ms      Normal sinus rhythm   Right atrial enlargement   Moderate voltage criteria for LVH, may be normal variant   Borderline ECG   When compared with ECG of 20-SEP-2022 16:05,   No significant change was found      Referred By: EDMD           Confirmed By:           LABS:    I have reviewed and interpreted all of the currently available lab results from this visit (if applicable):  Results for orders placed or performed during the hospital encounter of 12/06/22   COVID-19 and FLU A/B PCR - Swab, Nasopharynx    Specimen: Nasopharynx; Swab   Result Value Ref Range    COVID19 Not Detected Not Detected - Ref. Range    Influenza A PCR Not Detected Not Detected    Influenza B PCR Not Detected Not Detected   Comprehensive Metabolic Panel    Specimen: Blood   Result Value Ref Range    Glucose 350 (H) 65 - 99 mg/dL    BUN 22 8 - 23 mg/dL    Creatinine 1.34 (H) 0.57 - 1.00 mg/dL    Sodium 141 136 - 145 mmol/L    Potassium 3.3 (L) 3.5 - 5.2 mmol/L    Chloride 98 98 - 107 mmol/L    CO2 22.0 22.0 - 29.0 mmol/L    Calcium 10.1 8.6 - 10.5 mg/dL    Total Protein 8.5 6.0 - 8.5 g/dL    Albumin 4.70 3.50 - 5.20 g/dL    ALT (SGPT) 14 1 - 33 U/L    AST (SGOT) 18 1 - 32 U/L    Alkaline Phosphatase 111 39 - 117 U/L    Total Bilirubin 0.6 0.0 - 1.2 mg/dL    Globulin 3.8 gm/dL    A/G Ratio 1.2 g/dL    BUN/Creatinine Ratio 16.4 7.0 - 25.0    Anion Gap 21.0 (H) 5.0 - 15.0 mmol/L    eGFR 44.6 (L) >60.0 mL/min/1.73   Lipase    Specimen: Blood   Result Value Ref Range    Lipase 23 13 - 60 U/L   Urinalysis With Microscopic If Indicated (No Culture) - Urine, Clean Catch    Specimen: Urine, Clean Catch   Result Value Ref Range    Color, UA Yellow Yellow, Straw    Appearance, UA Clear Clear    pH, UA 7.0 5.0 - 8.0    Specific Gravity, UA 1.018 1.001 - 1.030    Glucose, UA >=1000 mg/dL (3+) (A) Negative    Ketones, UA  Trace (A) Negative    Bilirubin, UA Negative Negative    Blood, UA Negative Negative    Protein, UA >=300 mg/dL (3+) (A) Negative    Leuk Esterase, UA Negative Negative    Nitrite, UA Negative Negative    Urobilinogen, UA 0.2 E.U./dL 0.2 - 1.0 E.U./dL   Troponin    Specimen: Blood   Result Value Ref Range    Troponin T <0.010 0.000 - 0.030 ng/mL   CBC Auto Differential    Specimen: Blood   Result Value Ref Range    WBC 12.55 (H) 3.40 - 10.80 10*3/mm3    RBC 4.40 3.77 - 5.28 10*6/mm3    Hemoglobin 13.3 12.0 - 15.9 g/dL    Hematocrit 39.2 34.0 - 46.6 %    MCV 89.1 79.0 - 97.0 fL    MCH 30.2 26.6 - 33.0 pg    MCHC 33.9 31.5 - 35.7 g/dL    RDW 12.7 12.3 - 15.4 %    RDW-SD 41.6 37.0 - 54.0 fl    MPV 10.0 6.0 - 12.0 fL    Platelets 385 140 - 450 10*3/mm3    Neutrophil % 91.8 (H) 42.7 - 76.0 %    Lymphocyte % 5.5 (L) 19.6 - 45.3 %    Monocyte % 2.1 (L) 5.0 - 12.0 %    Eosinophil % 0.0 (L) 0.3 - 6.2 %    Basophil % 0.2 0.0 - 1.5 %    Immature Grans % 0.4 0.0 - 0.5 %    Neutrophils, Absolute 11.52 (H) 1.70 - 7.00 10*3/mm3    Lymphocytes, Absolute 0.69 (L) 0.70 - 3.10 10*3/mm3    Monocytes, Absolute 0.26 0.10 - 0.90 10*3/mm3    Eosinophils, Absolute 0.00 0.00 - 0.40 10*3/mm3    Basophils, Absolute 0.03 0.00 - 0.20 10*3/mm3    Immature Grans, Absolute 0.05 0.00 - 0.05 10*3/mm3    nRBC 0.0 0.0 - 0.2 /100 WBC   Beta Hydroxybutyrate Quantitative    Specimen: Blood   Result Value Ref Range    Beta-Hydroxybutyrate Quant 1.567 (H) 0.020 - 0.270 mmol/L   Blood Gas, Venous With Co-Ox    Specimen: Venous Blood   Result Value Ref Range    Site Right Radial     pH, Venous 7.399 7.310 - 7.410 pH Units    pCO2, Venous 38.8 (L) 41.0 - 51.0 mm Hg    pO2, Venous 39.1 27.0 - 53.0 mm Hg    HCO3, Venous 23.9 22.0 - 28.0 mmol/L    Base Excess, Venous -0.7 -2.0 - 2.0 mmol/L    Hemoglobin, Blood Gas 12.5 (L) 14 - 18 g/dL    Oxyhemoglobin Venous 72.2 %    Methemoglobin Venous 0.6 %    Carboxyhemoglobin Venous 1.0 %    CO2 Content 25.1 22 - 33 mmol/L  "   Temperature 37.0 C    Barometric Pressure for Blood Gas      Modality Room Air     FIO2 21 %    Rate 0 Breaths/minute    PIP 0 cmH2O    IPAP 0     EPAP 0     Note     Urinalysis, Microscopic Only - Urine, Clean Catch    Specimen: Urine, Clean Catch   Result Value Ref Range    RBC, UA 3-6 (A) None Seen, 0-2 /HPF    WBC, UA 0-2 None Seen, 0-2 /HPF    Bacteria, UA None Seen None Seen, Trace /HPF    Squamous Epithelial Cells, UA 0-2 None Seen, 0-2 /HPF    Hyaline Casts, UA None Seen 0 - 6 /LPF    Methodology Automated Microscopy    ECG 12 Lead Tachycardia   Result Value Ref Range    QT Interval 372 ms    QTC Interval 474 ms        All other labs were within normal range or not returned as of this dictation.      EMERGENCY DEPARTMENT COURSE and DIFFERENTIAL DIAGNOSIS/MDM:   Vitals:    Vitals:    12/06/22 2302 12/06/22 2305 12/07/22 0214 12/07/22 0400   BP:  (!) 195/97 (!) 215/108 (!) 231/112   Pulse: 100  110 102   Resp: 18 22 19   Temp:  98.2 °F (36.8 °C)     TempSrc:  Oral     SpO2: 100%  97% 100%   Weight: 60.3 kg (133 lb)      Height: 172.7 cm (68\")          ED Course as of 12/07/22 0426   Wed Dec 07, 2022   0402 On reexamination, the patient is feeling significantly better.  I have reexamined her abdomen and remains soft and nontender.  She continues to complain of no abdominal pain.  Her blood pressure is elevated as well as her glucose and she admits she has not been taking any of her medications.  She is hyperglycemic with some ketones but I suspect this is secondary to dehydration.  She is not acidemic and do not feel his presentation is consistent with DKA, no lyte derangement, no evidence of acute hepatitis/pancreatitis. [NS]   0424 I spoke w/ Dr. Kendrick who accepts the pt for admission [NS]   0425 Patient w/ ongoing vomiting on standing. Will admit to hospitalist. [NS]      ED Course User Index  [NS] Avtar Waite MD           MEDICATIONS ADMINISTERED IN ED:  Medications   droperidol (INAPSINE) " injection 2.5 mg (2.5 mg Intravenous Not Given 12/7/22 8318)   sodium chloride 0.9 % bolus 1,000 mL (0 mL Intravenous Stopped 12/7/22 5011)   metoclopramide (REGLAN) injection 10 mg (10 mg Intravenous Given 12/7/22 7144)       PROCEDURES:  Procedures    CRITICAL CARE TIME    Total Critical Care time was 0 minutes, excluding separately reportable procedures.   There was a high probability of clinically significant/life threatening deterioration in the patient's condition which required my urgent intervention.      FINAL IMPRESSION      1. Nausea and vomiting, unspecified vomiting type    2. History of diabetic gastroparesis    3. Hyperglycemia          DISPOSITION/PLAN     ED Disposition     ED Disposition   Decision to Admit    Condition   --    Comment   --                 Avtar Waite MD  Attending Emergency Physician               Avtar Waite MD  12/07/22 9870

## 2022-12-07 NOTE — H&P
[facility] Medicine Services  HISTORY AND PHYSICAL    Patient Name: Thelma Michael  : 1960  MRN: [id number]  Primary Care Physician: Monet Howe APRN  Date of admission: 2022    Subjective   Subjective     Chief Complaint:  Fatigue, nausea, vomiting    HPI:  Thelma Michael is a 61 y.o. female with PMH significant for DM 1 with gastroparesis s/p gastric stimulator, HTN, HLD, tobacco abuse, who presents to the ED with complaint of fatigue, nausea, and vomiting.  Pt c/o N/V and abdominal discomfort x 2 days. She also c/o diarrhea, w/ ~4 episodes over the last 24 hours. Pt has been unable to tolerate oral intake and has been unable to take any of her routine medications 2/2 N/V.   Pt reports her symptoms are 2/2 her long-standing hx of gastroparesis. Pt follows w/ a specialist in [place]. She has un upcoming appointment on  for possible surgical intervention.   Pt denies fever/chills, chest pain, dyspnea, cough, melena, dysuria, edema, syncope, confusion.   Upon arrival to the ED, she is noted to be hypokalemic, has elevated creatinine, and leukocytosis.  She is also noted to be hypertensive.    Review of Systems   Constitutional: Positive for appetite change and fatigue. Negative for activity change, chills, diaphoresis, fever and unexpected weight change.   HENT: Negative.  Negative for congestion and sore throat.    Eyes: Negative.  Negative for visual disturbance.   Respiratory: Negative.  Negative for cough, chest tightness, shortness of breath and wheezing.    Cardiovascular: Negative.  Negative for chest pain, palpitations and leg swelling.   Gastrointestinal: Positive for abdominal pain, diarrhea, nausea and vomiting. Negative for abdominal distention.   Endocrine: Negative.    Genitourinary: Negative.  Negative for difficulty urinating, frequency and urgency.   Musculoskeletal: Negative.  Negative for arthralgias, back pain, gait problem  and myalgias.   Skin: Negative.  Negative for color change, pallor, rash and wound.   Allergic/Immunologic: Negative.  Negative for immunocompromised state.   Neurological: Negative.  Negative for dizziness, syncope, facial asymmetry, speech difficulty, weakness, light-headedness, numbness and headaches.   Hematological: Negative.  Does not bruise/bleed easily.   Psychiatric/Behavioral: Negative.  Negative for confusion. The patient is not nervous/anxious.    All other systems reviewed and are negative.     Personal History     Past Medical History:   Diagnosis Date   • Acid reflux    • Acute bronchitis    • Cardiac murmur    • Diabetes mellitus (HCC)    • H/O echocardiogram 08/07/2012    i. LVEF 65%.ii. Mild LVH.iii. Borderline evidence of atrial septal aneurysm.  No PFO.    • History of nuclear stress test 08/22/2014    Negative for ischemia and scars; LVEF 77%.     • Hyperlipidemia    • Hypertension    • Impacted cerumen of both ears    • Migraine    • Self-catheterizes urinary bladder    • Sinusitis    • Stroke (HCC)    • Tobacco abuse     quit 4 days ago.     • Urticaria      Past Surgical History:   Procedure Laterality Date   • CAPSULE ENDOSCOPY  07/27/2021    Procedure: PILLCAM DEPLOYMENT;  Surgeon: Mikael Worthy MD;  Location:  [facility];  Service: Gastroenterology;;   • COLONOSCOPY     • COLONOSCOPY N/A 07/27/2021    Procedure: COLONOSCOPY;  Surgeon: Mikael Worthy MD;  Location:  [facility];  Service: Gastroenterology;  Laterality: N/A;   • DENTAL PROCEDURE     • ENDOSCOPY N/A 06/30/2021    Procedure: ESOPHAGOGASTRODUODENOSCOPY;  Surgeon: Brunner, Mark I, MD;  Location:  [facility];  Service: Gastroenterology;  Laterality: N/A;   • ENDOSCOPY N/A 07/27/2021    Procedure: ESOPHAGOGASTRODUODENOSCOPY;  Surgeon: Mikael Worthy MD;  Location:  [facility];  Service: Gastroenterology;  Laterality: N/A;   • ENDOSCOPY WITH JTUBE N/A 03/16/2022    Procedure:  ESOPHAGOGASTRODUODENOSCOPY WITH JEJUNAL TUBE INSERTION;  Surgeon: Thom Glover MD;  Location: [facility];  Service: Gastroenterology;  Laterality: N/A;   • NO PAST SURGERIES     • UPPER GASTROINTESTINAL ENDOSCOPY       Family History:  family history includes ADD / ADHD in an other family member; Anxiety disorder in an other family member; Arthritis in an other family member; Depression in an other family member; Diabetes in her brother and another family member; Heart disease in an other family member; Hyperlipidemia in her mother and another family member; Hypertension in her brother, mother, and another family member; Lung cancer in an other family member; Osteoporosis in an other family member. Otherwise pertinent FHx was reviewed and unremarkable.     Social History:  reports that she quit smoking about 3 years ago. Her smoking use included cigarettes. She has a 7.50 pack-year smoking history. She has never used smokeless tobacco. She reports current alcohol use of about 1.0 standard drink per week. She reports that she does not use drugs.  Social History     Social History Narrative   • Not on file     Medications:  Ascorbic Acid, BASAGLAR KWIKPEN, Dexcom G6 , Dexcom G6 Sensor, Dexcom G6 Transmitter, FLUoxetine, FreeStyle Lite, Insulin Pen Needle, Metoclopramide HCl, Multivitamin-Minerals, acetaminophen, albuterol sulfate HFA, amLODIPine, aspirin, atorvastatin, calcium carbonate, diphenhydrAMINE, donepezil, doxazosin, estradiol, ferrous sulfate, gabapentin, glucose blood, glucose monitor, melatonin, metoclopramide, mirtazapine, multivitamin, pantoprazole, sennosides-docusate, traMADol, traZODone, and vitamin D    No Known Allergies    Objective   Objective     Vital Signs:   Temp:  [98.2 °F (36.8 °C)] 98.2 °F (36.8 °C)  Heart Rate:  [100-110] 102  Resp:  [18-22] 19  BP: (195-231)/() 231/112    Physical Exam     Constitutional: Awake, alert; ill appearing   Eyes: PERRLA, sclerae  anicteric, no conjunctival injection  HENT: NCAT, mucous membranes dry   Neck: Supple, no thyromegaly, no lymphadenopathy, trachea midline  Respiratory: Clear to auscultation bilaterally, nonlabored respirations   Cardiovascular: RRR, no murmurs, rubs, or gallops, no peripheral edema   Gastrointestinal: Positive bowel sounds, soft, non-distended; generalized TTP   Musculoskeletal: Normal ROM bilaterally   Psychiatric: Appropriate affect, cooperative  Neurologic: Oriented x 3, strength symmetric in all extremities, Cranial Nerves grossly intact to confrontation, speech clear  Skin: No rashes, lesions or wounds     Result Review:  I have personally reviewed the results from the time of this admission to 12/7/2022 04:56 EST and agree with these findings:  [x]  Laboratory list / accordion  []  Microbiology  []  Radiology  []  EKG/Telemetry   []  Cardiology/Vascular   []  Pathology  [x]  Old records    LAB RESULTS:      Lab 12/07/22  0128   WBC 12.55*   HEMOGLOBIN 13.3   HEMATOCRIT 39.2   PLATELETS 385   NEUTROS ABS 11.52*   IMMATURE GRANS (ABS) 0.05   LYMPHS ABS 0.69*   MONOS ABS 0.26   EOS ABS 0.00   MCV 89.1         Lab 12/07/22  0128   SODIUM 141   POTASSIUM 3.3*   CHLORIDE 98   CO2 22.0   ANION GAP 21.0*   BUN 22   CREATININE 1.34*   EGFR 44.6*   GLUCOSE 350*   CALCIUM 10.1         Lab 12/07/22  0128   TOTAL PROTEIN 8.5   ALBUMIN 4.70   GLOBULIN 3.8   ALT (SGPT) 14   AST (SGOT) 18   BILIRUBIN 0.6   ALK PHOS 111   LIPASE 23         Lab 12/07/22  0128   TROPONIN T <0.010                 Lab 12/07/22  0357   FIO2 21   CARBOXYHEMOGLOBIN (VENOUS) 1.0     Brief Urine Lab Results  (Last result in the past 365 days)      Color   Clarity   Blood   Leuk Est   Nitrite   Protein   CREAT   Urine HCG        12/07/22 0400 Yellow   Clear   Negative   Negative   Negative   >=300 mg/dL (3+)               Microbiology Results (last 10 days)     Procedure Component Value - Date/Time    COVID PRE-OP / PRE-PROCEDURE SCREENING ORDER (NO  ISOLATION) - Swab, Nasopharynx [[id number]]  (Normal) Collected: 12/06/22 2333    Lab Status: Final result Specimen: Swab from Nasopharynx Updated: 12/07/22 0022    Narrative:      The following orders were created for panel order COVID PRE-OP / PRE-PROCEDURE SCREENING ORDER (NO ISOLATION) - Swab, Nasopharynx.  Procedure                               Abnormality         Status                     ---------                               -----------         ------                     COVID-19 and FLU A/B PCR...[[id number]]  Normal              Final result                 Please view results for these tests on the individual orders.    COVID-19 and FLU A/B PCR - Swab, Nasopharynx [[id number]]  (Normal) Collected: 12/06/22 2333    Lab Status: Final result Specimen: Swab from Nasopharynx Updated: 12/07/22 0022     COVID19 Not Detected     Influenza A PCR Not Detected     Influenza B PCR Not Detected    Narrative:      Fact sheet for providers: https://www.fda.gov/media/232580/download    Fact sheet for patients: https://www.fda.gov/media/798755/download    Test performed by PCR.          No radiology results from the last 24 hrs    Results for orders placed during the hospital encounter of 11/19/19    Adult Transesophageal Echo (LIZANDRO) W/ Cont if Necessary Per Protocol    Interpretation Summary  · Left ventricular systolic function is normal. Estimated EF = 65%.  · Left ventricular wall thickness is consistent with hypertrophy.  · Small patent foramen ovale present. Saline test results are positive with valsalva manuever.  · There is mild mitral valve prolapse of the anterior mitral leaflet.  · Mild tricuspid valve regurgitation is present.  · Estimated right ventricular systolic pressure from tricuspid regurgitation is mildly elevated (35-45 mmHg).  · There is mild plaque in the descending aorta present.      Assessment & Plan   Assessment & Plan       Hyperlipidemia    Hypertension    Personal history of cardiac murmur     Nausea & vomiting    Hypertensive urgency    Uncontrolled type 1 diabetes mellitus with hyperglycemia (HCC)    Gastroparesis    Elevated serum creatinine    Leukocytosis    61 y.o. female with PMH significant for DM 1 with gastroparesis s/p gastric stimulator, HTN, HLD, tobacco abuse, who presents to the ED with complaint of fatigue, nausea, and vomiting    Nausea and vomiting   Gastroparesis   -follows w/ specialist in [place]; next appt 12/16- pt reports for possible surgical intervention?  -s/p gastric stimulator   -Reglan given in ED, continue routine Reglan   -PRN antiemetics   -IVF  -mag pending   -lactic acid pending   -consider CT abd/pel if pt symptoms did not improve     Leukocytosis  -Lactic acid, procal pending   -UA negative   -CXR pending   -CBC in am     Hypertensive Urgency   - start cardene gtt, titrate for -180   -unable to tolerate PO medications at this time  -continue home medications as able to tolerate PO     Elevated Serum Cr+  -baseline cr+ ~0.76-0.98, currently 1.34  -gentle IVF     Hypokalemia  -electrolyte replacement   -mag pending     DM type 1  -HgA1c in am   -Beta-hydroxybutyrate 1.567  -VBG, no acidosis   -serum osmolality pending     DVT prophylaxis:  Heparin     CODE STATUS:         This note has been completed as part of a split-shared workflow.     Signature:     Electronically signed by HOWIE Mckeon, 12/07/22, 4:46 AM EST.    Electronically signed by HOWIE Moreno, 12/07/22, 4:56 AM EST.  Patient seen and examined at bedside.  Patient is a 61-year-old -American female with past medical history significant for hypertension, hyperlipidemia, she has an insulin pump, gastroparesis and she follows with a specialist in [place].  Evidently she started having severe abdominal pain and nausea and vomiting about 2 days ago.  Patient tells me that she has these episodes bloated frequently.  Here at the emergency room patient's blood glucose is elevated  and is evidence of ketones also.  Denies any fever or chills.  No chest pain or palpitation or shortness of breath.  No cough or coryza or any respiratory symptoms.  However, patient did have headache prior to admission.  On admission patient had very elevated blood pressure and is a started on Cardene drip.    Physical exam:  Constitutional: No acute distress, looks uncomfortable  HENT: NCAT, mucous membranes moist  Respiratory: Clear to auscultation bilaterally, respiratory effort normal   Cardiovascular: RRR, no murmurs, rubs, or gallops  Gastrointestinal: Positive bowel sounds, soft, nontender, nondistended  Musculoskeletal: No bilateral ankle edema  Psychiatric: Flat affect, cooperative  Neurologic: Awake, alert, oriented x3, no focality appreciated, speech clear  Skin: No rashes  Assessment and plan:  61-year-old -American female with hypertension, hyperlipidemia, type 1 diabetes mellitus, gastroparesis with episodes of nausea vomiting.  Patient came to the emergency room with complaint of nausea vomiting and abdominal pain for 2 days.  Patient also was very hypertensive.  We will admit the patient to telemetry as inpatient.  Insulin administration as patient is turning her pump off.  Please see the above for more details.

## 2022-12-08 LAB
GLUCOSE BLDC GLUCOMTR-MCNC: 136 MG/DL (ref 70–130)
GLUCOSE BLDC GLUCOMTR-MCNC: 137 MG/DL (ref 70–130)
GLUCOSE BLDC GLUCOMTR-MCNC: 191 MG/DL (ref 70–130)
GLUCOSE BLDC GLUCOMTR-MCNC: 251 MG/DL (ref 70–130)

## 2022-12-08 PROCEDURE — 99232 SBSQ HOSP IP/OBS MODERATE 35: CPT | Performed by: INTERNAL MEDICINE

## 2022-12-08 PROCEDURE — 25010000002 METOCLOPRAMIDE PER 10 MG: Performed by: INTERNAL MEDICINE

## 2022-12-08 PROCEDURE — 82962 GLUCOSE BLOOD TEST: CPT

## 2022-12-08 PROCEDURE — 25010000002 HEPARIN (PORCINE) PER 1000 UNITS: Performed by: NURSE PRACTITIONER

## 2022-12-08 PROCEDURE — 63710000001 INSULIN LISPRO (HUMAN) PER 5 UNITS: Performed by: NURSE PRACTITIONER

## 2022-12-08 PROCEDURE — 25010000002 PROMETHAZINE PER 50 MG: Performed by: INTERNAL MEDICINE

## 2022-12-08 PROCEDURE — 25010000002 METOCLOPRAMIDE PER 10 MG: Performed by: NURSE PRACTITIONER

## 2022-12-08 RX ORDER — METOCLOPRAMIDE HYDROCHLORIDE 5 MG/ML
10 INJECTION INTRAMUSCULAR; INTRAVENOUS
Status: DISCONTINUED | OUTPATIENT
Start: 2022-12-08 | End: 2022-12-10

## 2022-12-08 RX ADMIN — HEPARIN SODIUM 5000 UNITS: 5000 INJECTION INTRAVENOUS; SUBCUTANEOUS at 06:37

## 2022-12-08 RX ADMIN — FLUOXETINE 20 MG: 20 CAPSULE ORAL at 08:38

## 2022-12-08 RX ADMIN — HEPARIN SODIUM 5000 UNITS: 5000 INJECTION INTRAVENOUS; SUBCUTANEOUS at 13:23

## 2022-12-08 RX ADMIN — INSULIN LISPRO 2 UNITS: 100 INJECTION, SOLUTION INTRAVENOUS; SUBCUTANEOUS at 16:45

## 2022-12-08 RX ADMIN — SODIUM CHLORIDE 100 ML/HR: 9 INJECTION, SOLUTION INTRAVENOUS at 06:41

## 2022-12-08 RX ADMIN — ASPIRIN 81 MG: 81 TABLET, COATED ORAL at 08:40

## 2022-12-08 RX ADMIN — Medication 1 TABLET: at 08:39

## 2022-12-08 RX ADMIN — Medication 5 MG: at 20:35

## 2022-12-08 RX ADMIN — METOCLOPRAMIDE 10 MG: 5 INJECTION, SOLUTION INTRAMUSCULAR; INTRAVENOUS at 11:04

## 2022-12-08 RX ADMIN — METOCLOPRAMIDE 5 MG: 5 INJECTION, SOLUTION INTRAMUSCULAR; INTRAVENOUS at 08:38

## 2022-12-08 RX ADMIN — TRAZODONE HYDROCHLORIDE 50 MG: 50 TABLET ORAL at 20:35

## 2022-12-08 RX ADMIN — DONEPEZIL HYDROCHLORIDE 5 MG: 10 TABLET, FILM COATED ORAL at 20:36

## 2022-12-08 RX ADMIN — ATORVASTATIN CALCIUM 80 MG: 40 TABLET, FILM COATED ORAL at 20:35

## 2022-12-08 RX ADMIN — TERAZOSIN HYDROCHLORIDE 1 MG: 1 CAPSULE ORAL at 20:35

## 2022-12-08 RX ADMIN — Medication 10 ML: at 08:41

## 2022-12-08 RX ADMIN — PROMETHAZINE HYDROCHLORIDE 12.5 MG: 25 INJECTION INTRAMUSCULAR; INTRAVENOUS at 13:23

## 2022-12-08 RX ADMIN — METOCLOPRAMIDE 10 MG: 5 INJECTION, SOLUTION INTRAMUSCULAR; INTRAVENOUS at 20:37

## 2022-12-08 RX ADMIN — PANTOPRAZOLE SODIUM 40 MG: 40 TABLET, DELAYED RELEASE ORAL at 08:40

## 2022-12-08 RX ADMIN — METOCLOPRAMIDE 10 MG: 5 INJECTION, SOLUTION INTRAMUSCULAR; INTRAVENOUS at 16:45

## 2022-12-08 RX ADMIN — SODIUM CHLORIDE 100 ML/HR: 9 INJECTION, SOLUTION INTRAVENOUS at 16:41

## 2022-12-08 RX ADMIN — MIRTAZAPINE 15 MG: 15 TABLET, FILM COATED ORAL at 20:36

## 2022-12-08 RX ADMIN — INSULIN LISPRO 4 UNITS: 100 INJECTION, SOLUTION INTRAVENOUS; SUBCUTANEOUS at 08:40

## 2022-12-08 RX ADMIN — HEPARIN SODIUM 5000 UNITS: 5000 INJECTION INTRAVENOUS; SUBCUTANEOUS at 21:37

## 2022-12-08 RX ADMIN — Medication 10 ML: at 20:37

## 2022-12-08 RX ADMIN — AMLODIPINE BESYLATE 10 MG: 10 TABLET ORAL at 06:43

## 2022-12-08 NOTE — PLAN OF CARE
2/26/2018    Maurice Luther   [address] [facility] [address]     Dear Maurice:    I have reviewed your lab work done recently at the office.  Please take note of the following results and recommendations.              Normal Values          Results  Sodium       135-145 140   Creatinine      0.50-1.10 0.92   Potassium        3.4-5.1 4.6   Blood Sugar         65-99 93                       Glycohemogloin (Hgb A1c)     Less than 7    Total Cholesterol   Less than 200    155   Triglycerides   Less than 150 64   HDL (good cholesterol)      Above 50 49   LDL (bad cholesterol)   Less than 70 93   AST (liver enzyme)   Less than 38 18   ALT (liver enzyme)   Less than 79   38   White blood cell 4.2 - 11.0    Hemoglobin 12.0-15.5    Hematocrit 36.0 - 46.5    Platelets 140-450    Other:  Testosterone 280.0-1100.0 532.9     Recommendations:  Per phone call here are your test results along with low cholesterol diet. Reminder, fasting blood work in 4 weeks. Follow up as scheduled at your next scheduled appointment. I hope all is going well for you.  If you have any questions or concerns, please feel free to call the office at [phone].    Sincerely,        Michael Monet M.D.  [address] [place] [address]   Goal Outcome Evaluation:  Sinus tach, alert and oriented, vss/ra. Patient has been sleeping most of the day. PRN and scheduled antiemetics given as ordered although patient says nothing is helping. No stool today. Will continue to monitor.                   Psych/Behavioral

## 2022-12-08 NOTE — PLAN OF CARE
Goal Outcome Evaluation:  Plan of Care Reviewed With: patient        Progress: no change  Outcome Evaluation: Patient admitted yesterday with gastroparesis, naussea, vomitting. Vss,St,Ra. patient receiving ivf will continue to monitor.

## 2022-12-08 NOTE — PLAN OF CARE
Goal Outcome Evaluation:   Sinus tach on the monitor. RA/ VSS. Oriented to room, gave PRN zofran once for nausea. Will continue to monitor.

## 2022-12-08 NOTE — PROGRESS NOTES
"                    Clinical Nutrition       Patient Name: Thelma Michael  YOB: 1960  MRN: [id number]  Date of Encounter: 12/08/22 13:32 EST  Admission date: 12/6/2022    Comment:    Pt does not meet criteria for malnutrition at this time.     Agreeable to trial Boost Breeze - however unsure if pt will tolerate. Continues to have nausea and observed 0% of lunch tray.    If unable to tolerate PO intake, would consider placing NJT for alternative source of nutrition.    Reason for Visit   MST score 2+, Unintended wt loss, Reduced oral intake      EMR  Reviewed   Yes    Height: Height: 172.7 cm (68\")  Weight: Weight: 60.3 kg (133 lb) (12/06/22 2302) - estimated  **Requested measured weight  BMI: BMI (Calculated): 20.2   UBW: 120lbs (stated)  Last 15 Recorded Weights  View Complete Flowsheet  Weight Weight (kg) Weight (lbs) Weight Method VISIT REPORT   12/6/2022 60.328 kg 133 lb Estimated -   9/20/2022 53.524 kg 118 lb Stated -   9/2/2022 60.963 kg 134 lb 6.4 oz - Report   8/4/2022 61.236 kg 135 lb - Report   7/6/2022 59.24 kg 130 lb 9.6 oz - Report   7/1/2022 54.613 kg 120 lb 6.4 oz Standing scale -   6/29/2022 54.432 kg 120 lb Stated -   4/14/2022 57.153 kg 126 lb - Report   4/7/2022 63.504 kg 140 lb - Report   3/26/2022 53.615 kg 118 lb 3.2 oz Bed scale -   3/26/2022 54.885 kg 121 lb Stated -   3/24/2022 54.885 kg 121 lb - Report   3/18/2022 58.287 kg 128 lb 8 oz Bed scale -   3/17/2022 57.698 kg 127 lb 3.2 oz Bed scale -   3/16/2022 57.879 kg 127 lb 9.6 oz - -     Unable to determine weight change due to last measure weight on 9/4.      Problem:    Nausea and vomiting, unspecified vomiting type    Hyperlipidemia    Hypertension    Personal history of cardiac murmur    Nausea & vomiting    Hypertensive urgency    Uncontrolled type 1 diabetes mellitus with hyperglycemia (HCC)    Gastroparesis    Elevated serum creatinine    Leukocytosis       Reported/Observed/Food/Nutrition Related - Comments "     Pt resting feeling general malaise and limited weight/nutrition history. Pt reports inability to tolerated PO intake in past 2 days. Pt had a gastric emptying study on 11/3 with mild liquid delay and normal solids. Also has a gastric stimulator. Reports unable to tolerate clear liquids today - agreeable to trial ONS. Endorsed diarrhea. Denies difficulty chewing/swallowing - edentulous with lower dentures slightly poor fit. NKFA.       Current Nutrition Prescription     Diet: Liquid Diets, Diabetic Diets; Clear Liquid; Consistent Carbohydrate; Texture: Regular Texture (IDDSI 7); Fluid Consistency: Thin (IDDSI 0)  Orders Placed This Encounter      Dietary Nutrition Supplements Boost Breeze (Clear Liquid); mixed berry      Average Intake from [employer]% of lunch tray observed    Nutrition Diagnosis     Problem Altered GI function   Etiology Gastroparesis    Signs/Symptoms Intractable N/V, intolerance of CL   Status:    Actions     Follow treatment progress, Care plan reviewed, Advise alternate selection, Advised available snacks, Encourage intake, Supplement provided     Provide Boost Breeze 2x/day   Advance diet as clinically indicated to a low residue diet as tolerated  If unable to advance diet and tolerate PO intake, would consider placing NJT to support nutrition needs.    Pt does not meet MSA criteria at this time     Monitor Per Protocol      Kylah Mercer, MS,RD,LD,   Time Spent: 30min

## 2022-12-08 NOTE — PAYOR COMM NOTE
"Alisa Michael (61 y.o. Female)     Date of Birth   1960    Social Security Number       Address   [address] [place] [address]    Home Phone   [phone]    MRN   [id number]       [facility]    Marital Status                               Admission Date   12/6/22    Admission Type   Emergency    Admitting Provider   Shirin Swenson MD    Attending Provider   Shirin Swenson MD    Department, Room/Bed   [facility] 4G, S445/1       Discharge Date       Discharge Disposition       Discharge Destination                               Attending Provider: Shirin Swenson MD    Allergies: No Known Allergies    Isolation: None   Infection: None   Code Status: CPR    Ht: 172.7 cm (68\")   Wt: 60.3 kg (133 lb)    Admission Cmt: None   Principal Problem: Nausea and vomiting, unspecified vomiting type [R11.2]                 Active Insurance as of 12/6/2022     Primary Coverage     Payor Plan Insurance Group Employer/Plan Group    [facility]ly [facility] MEDICAID [facility]     Payor Plan Address Payor Plan Phone Number Payor Plan Fax Number Effective Dates    [address] [address] [phone]  4/2/2020 - None Entered    [facility] [id number]       Subscriber Name Subscriber Birth Date Member ID       ALISA MICHAEL 1960 [id number]                 Emergency Contacts      (Rel.) Home Phone Work Phone Mobile Phone    VINAYAK PENN (Son) [phone] -- [phone]    EVELINE CANTRELL (Daughter) [phone] -- [phone]    MichaelRenea arriola (Daughter) -- -- [phone]    Priscila Cantrell (Friend) -- -- [phone]            [place]: NPI [id number] Tax ID [id number]  Insurance Information                [facility] [place] MEDICAID Phone: [phone]    Subscriber: Alisa Michael Subscriber#: [id number]    Group#: [id number] Precert#: none             History & Physical      CramerKavon DO at 12/07/22 1233      "           [facility] Medicine Services  ADMISSION FOLLOW-UP NOTE          Patient admitted after midnight, H&P by my partner performed earlier on today's date reviewed.  Interim findings, labs, and charting also reviewed.        The [facility] Problem List has been managed and updated to include any new diagnoses:  Active Hospital Problems    Diagnosis  POA   • **Nausea and vomiting, unspecified vomiting type [R11.2]  Yes   • Elevated serum creatinine [R79.89]  Yes   • Leukocytosis [D72.829]  Yes   • Gastroparesis [K31.84]  Yes   • Uncontrolled type 1 diabetes mellitus with hyperglycemia (HCC) [E10.65]  Yes   • Nausea & vomiting [R11.2]  Yes   • Hypertensive urgency [I16.0]  Yes   • Personal history of cardiac murmur [Z86.79]  Not Applicable   • Hyperlipidemia [E78.5]  Yes   • Hypertension [I10]  Yes      Resolved Hospital Problems   No resolved problems to display.         ADDITIONAL PLAN:  - detailed assessment and plan from admission reviewed  - Patient admitted late this morning by Dr. Kendrick, chart reviewed  -Summary: This is a 62 y/o female w/ DM1 and gastroparesis s/p gastric stimulator followed by motility specialists in [place], [place] who presents with acute N/V/D and inability to keep home meds down    Assessment/Plan    DM gastroparesis s/p gastric stimulator w/ intractable N/V/D  -follows w/ motility clinic in [place]  -reglan  -prn antiemetics  -IVFs    HTN urgency  -cardene gtt  -home meds as able to tolerate    Impaired renal function  -likely pre-renal azotemia from n/v    DM type 1  -a1c pending  -follows w/ Dr. Ochoa (endo), has an insulin pump  -cont accuchecks w/ SSI    Tobacco use      Kavon Cramer DO  22        Electronically signed by Kavon Cramer DO at 22 [phone]     Anibal Kendrick MD at 22 [phone]              [facility] Medicine Services  HISTORY AND PHYSICAL    Patient Name: Thelam Michael  :  1960  MRN: [id number]  Primary Care Physician: Monet Howe, HOWIE  Date of admission: 12/6/2022    Subjective    Subjective     Chief Complaint:  Fatigue, nausea, vomiting    HPI:  Thelma Michael is a 61 y.o. female with PMH significant for DM 1 with gastroparesis s/p gastric stimulator, HTN, HLD, tobacco abuse, who presents to the ED with complaint of fatigue, nausea, and vomiting.  Pt c/o N/V and abdominal discomfort x 2 days. She also c/o diarrhea, w/ ~4 episodes over the last 24 hours. Pt has been unable to tolerate oral intake and has been unable to take any of her routine medications 2/2 N/V.   Pt reports her symptoms are 2/2 her long-standing hx of gastroparesis. Pt follows w/ a specialist in [place]. She has un upcoming appointment on 12/16 for possible surgical intervention.   Pt denies fever/chills, chest pain, dyspnea, cough, melena, dysuria, edema, syncope, confusion.   Upon arrival to the ED, she is noted to be hypokalemic, has elevated creatinine, and leukocytosis.  She is also noted to be hypertensive.    Review of Systems   Constitutional: Positive for appetite change and fatigue. Negative for activity change, chills, diaphoresis, fever and unexpected weight change.   HENT: Negative.  Negative for congestion and sore throat.    Eyes: Negative.  Negative for visual disturbance.   Respiratory: Negative.  Negative for cough, chest tightness, shortness of breath and wheezing.    Cardiovascular: Negative.  Negative for chest pain, palpitations and leg swelling.   Gastrointestinal: Positive for abdominal pain, diarrhea, nausea and vomiting. Negative for abdominal distention.   Endocrine: Negative.    Genitourinary: Negative.  Negative for difficulty urinating, frequency and urgency.   Musculoskeletal: Negative.  Negative for arthralgias, back pain, gait problem and myalgias.   Skin: Negative.  Negative for color change, pallor, rash and wound.   Allergic/Immunologic: Negative.   Negative for immunocompromised state.   Neurological: Negative.  Negative for dizziness, syncope, facial asymmetry, speech difficulty, weakness, light-headedness, numbness and headaches.   Hematological: Negative.  Does not bruise/bleed easily.   Psychiatric/Behavioral: Negative.  Negative for confusion. The patient is not nervous/anxious.    All other systems reviewed and are negative.     Personal History     Past Medical History:   Diagnosis Date   • Acid reflux    • Acute bronchitis    • Cardiac murmur    • Diabetes mellitus (HCC)    • H/O echocardiogram 08/07/2012    i. LVEF 65%.ii. Mild LVH.iii. Borderline evidence of atrial septal aneurysm.  No PFO.    • History of nuclear stress test 08/22/2014    Negative for ischemia and scars; LVEF 77%.     • Hyperlipidemia    • Hypertension    • Impacted cerumen of both ears    • Migraine    • Self-catheterizes urinary bladder    • Sinusitis    • Stroke (HCC)    • Tobacco abuse     quit 4 days ago.     • Urticaria      Past Surgical History:   Procedure Laterality Date   • CAPSULE ENDOSCOPY  07/27/2021    Procedure: PILLCAM DEPLOYMENT;  Surgeon: Mikael Worthy MD;  Location:  [facility];  Service: Gastroenterology;;   • COLONOSCOPY     • COLONOSCOPY N/A 07/27/2021    Procedure: COLONOSCOPY;  Surgeon: Mikael Worthy MD;  Location:  [facility];  Service: Gastroenterology;  Laterality: N/A;   • DENTAL PROCEDURE     • ENDOSCOPY N/A 06/30/2021    Procedure: ESOPHAGOGASTRODUODENOSCOPY;  Surgeon: Brunner, Mark I, MD;  Location:  [facility];  Service: Gastroenterology;  Laterality: N/A;   • ENDOSCOPY N/A 07/27/2021    Procedure: ESOPHAGOGASTRODUODENOSCOPY;  Surgeon: Mikael Worthy MD;  Location:  [facility];  Service: Gastroenterology;  Laterality: N/A;   • ENDOSCOPY WITH JTUBE N/A 03/16/2022    Procedure: ESOPHAGOGASTRODUODENOSCOPY WITH JEJUNAL TUBE INSERTION;  Surgeon: Thom Glover MD;  Location:  [facility];  Service:  Gastroenterology;  Laterality: N/A;   • NO PAST SURGERIES     • UPPER GASTROINTESTINAL ENDOSCOPY       Family History:  family history includes ADD / ADHD in an other family member; Anxiety disorder in an other family member; Arthritis in an other family member; Depression in an other family member; Diabetes in her brother and another family member; Heart disease in an other family member; Hyperlipidemia in her mother and another family member; Hypertension in her brother, mother, and another family member; Lung cancer in an other family member; Osteoporosis in an other family member. Otherwise pertinent FHx was reviewed and unremarkable.     Social History:  reports that she quit smoking about 3 years ago. Her smoking use included cigarettes. She has a 7.50 pack-year smoking history. She has never used smokeless tobacco. She reports current alcohol use of about 1.0 standard drink per week. She reports that she does not use drugs.  Social History     Social History Narrative   • Not on file     Medications:  Ascorbic Acid, BASAGLAR KWIKPEN, Dexcom G6 , Dexcom G6 Sensor, Dexcom G6 Transmitter, FLUoxetine, FreeStyle Lite, Insulin Pen Needle, Metoclopramide HCl, Multivitamin-Minerals, acetaminophen, albuterol sulfate HFA, amLODIPine, aspirin, atorvastatin, calcium carbonate, diphenhydrAMINE, donepezil, doxazosin, estradiol, ferrous sulfate, gabapentin, glucose blood, glucose monitor, melatonin, metoclopramide, mirtazapine, multivitamin, pantoprazole, sennosides-docusate, traMADol, traZODone, and vitamin D    No Known Allergies    Objective    Objective     Vital Signs:   Temp:  [98.2 °F (36.8 °C)] 98.2 °F (36.8 °C)  Heart Rate:  [100-110] 102  Resp:  [18-22] 19  BP: (195-231)/() 231/112    Physical Exam     Constitutional: Awake, alert; ill appearing   Eyes: PERRLA, sclerae anicteric, no conjunctival injection  HENT: NCAT, mucous membranes dry   Neck: Supple, no thyromegaly, no lymphadenopathy, trachea  midline  Respiratory: Clear to auscultation bilaterally, nonlabored respirations   Cardiovascular: RRR, no murmurs, rubs, or gallops, no peripheral edema   Gastrointestinal: Positive bowel sounds, soft, non-distended; generalized TTP   Musculoskeletal: Normal ROM bilaterally   Psychiatric: Appropriate affect, cooperative  Neurologic: Oriented x 3, strength symmetric in all extremities, Cranial Nerves grossly intact to confrontation, speech clear  Skin: No rashes, lesions or wounds     Result Review:  I have personally reviewed the results from the time of this admission to 12/7/2022 04:56 EST and agree with these findings:  [x]  Laboratory list / accordion  []  Microbiology  []  Radiology  []  EKG/Telemetry   []  Cardiology/Vascular   []  Pathology  [x]  Old records    LAB RESULTS:      Lab 12/07/22  0128   WBC 12.55*   HEMOGLOBIN 13.3   HEMATOCRIT 39.2   PLATELETS 385   NEUTROS ABS 11.52*   IMMATURE GRANS (ABS) 0.05   LYMPHS ABS 0.69*   MONOS ABS 0.26   EOS ABS 0.00   MCV 89.1         Lab 12/07/22  0128   SODIUM 141   POTASSIUM 3.3*   CHLORIDE 98   CO2 22.0   ANION GAP 21.0*   BUN 22   CREATININE 1.34*   EGFR 44.6*   GLUCOSE 350*   CALCIUM 10.1         Lab 12/07/22  0128   TOTAL PROTEIN 8.5   ALBUMIN 4.70   GLOBULIN 3.8   ALT (SGPT) 14   AST (SGOT) 18   BILIRUBIN 0.6   ALK PHOS 111   LIPASE 23         Lab 12/07/22  0128   TROPONIN T <0.010                 Lab 12/07/22  0357   FIO2 21   CARBOXYHEMOGLOBIN (VENOUS) 1.0     Brief Urine Lab Results  (Last result in the past 365 days)      Color   Clarity   Blood   Leuk Est   Nitrite   Protein   CREAT   Urine HCG        12/07/22 0400 Yellow   Clear   Negative   Negative   Negative   >=300 mg/dL (3+)               Microbiology Results (last 10 days)     Procedure Component Value - Date/Time    COVID PRE-OP / PRE-PROCEDURE SCREENING ORDER (NO ISOLATION) - Swab, Nasopharynx [[id number]]  (Normal) Collected: 12/06/22 1573    Lab Status: Final result Specimen: Swab from  Nasopharynx Updated: 12/07/22 0022    Narrative:      The following orders were created for panel order COVID PRE-OP / PRE-PROCEDURE SCREENING ORDER (NO ISOLATION) - Swab, Nasopharynx.  Procedure                               Abnormality         Status                     ---------                               -----------         ------                     COVID-19 and FLU A/B PCR...[[id number]]  Normal              Final result                 Please view results for these tests on the individual orders.    COVID-19 and FLU A/B PCR - Swab, Nasopharynx [[id number]]  (Normal) Collected: 12/06/22 2333    Lab Status: Final result Specimen: Swab from Nasopharynx Updated: 12/07/22 0022     COVID19 Not Detected     Influenza A PCR Not Detected     Influenza B PCR Not Detected    Narrative:      Fact sheet for providers: https://www.fda.gov/media/540646/download    Fact sheet for patients: https://www.fda.gov/media/315720/download    Test performed by PCR.          No radiology results from the last 24 hrs    Results for orders placed during the hospital encounter of 11/19/19    Adult Transesophageal Echo (LIZANDRO) W/ Cont if Necessary Per Protocol    Interpretation Summary  · Left ventricular systolic function is normal. Estimated EF = 65%.  · Left ventricular wall thickness is consistent with hypertrophy.  · Small patent foramen ovale present. Saline test results are positive with valsalva manuever.  · There is mild mitral valve prolapse of the anterior mitral leaflet.  · Mild tricuspid valve regurgitation is present.  · Estimated right ventricular systolic pressure from tricuspid regurgitation is mildly elevated (35-45 mmHg).  · There is mild plaque in the descending aorta present.      Assessment & Plan   Assessment & Plan       Hyperlipidemia    Hypertension    Personal history of cardiac murmur    Nausea & vomiting    Hypertensive urgency    Uncontrolled type 1 diabetes mellitus with hyperglycemia (HCC)    Gastroparesis     Elevated serum creatinine    Leukocytosis    61 y.o. female with PMH significant for DM 1 with gastroparesis s/p gastric stimulator, HTN, HLD, tobacco abuse, who presents to the ED with complaint of fatigue, nausea, and vomiting    Nausea and vomiting   Gastroparesis   -follows w/ specialist in [place]; next appt 12/16- pt reports for possible surgical intervention?  -s/p gastric stimulator   -Reglan given in ED, continue routine Reglan   -PRN antiemetics   -IVF  -mag pending   -lactic acid pending   -consider CT abd/pel if pt symptoms did not improve     Leukocytosis  -Lactic acid, procal pending   -UA negative   -CXR pending   -CBC in am     Hypertensive Urgency   - start cardene gtt, titrate for -180   -unable to tolerate PO medications at this time  -continue home medications as able to tolerate PO     Elevated Serum Cr+  -baseline cr+ ~0.76-0.98, currently 1.34  -gentle IVF     Hypokalemia  -electrolyte replacement   -mag pending     DM type 1  -HgA1c in am   -Beta-hydroxybutyrate 1.567  -VBG, no acidosis   -serum osmolality pending     DVT prophylaxis:  Heparin     CODE STATUS:         This note has been completed as part of a split-shared workflow.     Signature:     Electronically signed by HOWIE Mckeon, 12/07/22, 4:46 AM EST.    Electronically signed by HOWIE Moreno, 12/07/22, 4:56 AM EST.  Patient seen and examined at bedside.  Patient is a 61-year-old -American female with past medical history significant for hypertension, hyperlipidemia, she has an insulin pump, gastroparesis and she follows with a specialist in [place].  Evidently she started having severe abdominal pain and nausea and vomiting about 2 days ago.  Patient tells me that she has these episodes bloated frequently.  Here at the emergency room patient's blood glucose is elevated and is evidence of ketones also.  Denies any fever or chills.  No chest pain or palpitation or shortness of breath.  No cough  or coryza or any respiratory symptoms.  However, patient did have headache prior to admission.  On admission patient had very elevated blood pressure and is a started on Cardene drip.    Physical exam:  Constitutional: No acute distress, looks uncomfortable  HENT: NCAT, mucous membranes moist  Respiratory: Clear to auscultation bilaterally, respiratory effort normal   Cardiovascular: RRR, no murmurs, rubs, or gallops  Gastrointestinal: Positive bowel sounds, soft, nontender, nondistended  Musculoskeletal: No bilateral ankle edema  Psychiatric: Flat affect, cooperative  Neurologic: Awake, alert, oriented x3, no focality appreciated, speech clear  Skin: No rashes  Assessment and plan:  61-year-old -American female with hypertension, hyperlipidemia, type 1 diabetes mellitus, gastroparesis with episodes of nausea vomiting.  Patient came to the emergency room with complaint of nausea vomiting and abdominal pain for 2 days.  Patient also was very hypertensive.  We will admit the patient to telemetry as inpatient.  Insulin administration as patient is turning her pump off.  Please see the above for more details.                  Electronically signed by Anibal Kendrick MD at 12/07/22 0709          Emergency Department Notes      Avtar Waite MD at 12/06/22 0054            [place]    EMERGENCY DEPARTMENT ENCOUNTER      Pt Name: Thelma Michael  MRN: [id number]  YOB: 1960  Date of evaluation: 12/6/2022  Provider: Avtar Waite MD    CHIEF COMPLAINT       Chief Complaint   Patient presents with   • Nausea   • Vomiting   • Fatigue         HISTORY OF PRESENT ILLNESS  (Location/Symptom, Timing/Onset, Context/Setting, Quality, Duration, Modifying Factors, Severity.)   Thelma Michael is a 61 y.o. female who presents to the emergency department with ongoing vomiting and progressively worsening severe generalized weakness that is worse with exertion in setting of history of gastroparesis.   Patient states that this feels like her typical exacerbation of her gastroparesis.  She denies any associated abdominal pain, diarrhea, fever, chills, chest pain, shortness of breath.  No obvious inciting or modifying factors.      Nursing notes were reviewed.    REVIEW OF SYSTEMS    (2-9 systems for level 4, 10 or more for level 5)   ROS:  General: Weakness  Cardiovascular:  No chest pain, no palpitations  Respiratory:  No shortness of breath, no cough, no wheezing  Gastrointestinal: Vomiting  Musculoskeletal:  No muscle pain, no joint pain  Skin:  No rash  Neurologic:  No speech problems, no headache, no extremity numbness, no extremity tingling, no extremity weakness  Psychiatric:  No anxiety  Genitourinary:  No dysuria, no hematuria    Except as noted above the remainder of the review of systems was reviewed and negative.       PAST MEDICAL HISTORY     Past Medical History:   Diagnosis Date   • Acid reflux    • Acute bronchitis    • Cardiac murmur    • Diabetes mellitus (HCC)    • H/O echocardiogram 08/07/2012    i. LVEF 65%.ii. Mild LVH.iii. Borderline evidence of atrial septal aneurysm.  No PFO.    • History of nuclear stress test 08/22/2014    Negative for ischemia and scars; LVEF 77%.     • Hyperlipidemia    • Hypertension    • Impacted cerumen of both ears    • Migraine    • Self-catheterizes urinary bladder    • Sinusitis    • Stroke (HCC)    • Tobacco abuse     quit 4 days ago.     • Urticaria          SURGICAL HISTORY       Past Surgical History:   Procedure Laterality Date   • CAPSULE ENDOSCOPY  07/27/2021    Procedure: PILLCAM DEPLOYMENT;  Surgeon: Mikael Worthy MD;  Location:  [facility];  Service: Gastroenterology;;   • COLONOSCOPY     • COLONOSCOPY N/A 07/27/2021    Procedure: COLONOSCOPY;  Surgeon: Mikael Worthy MD;  Location:  [facility];  Service: Gastroenterology;  Laterality: N/A;   • DENTAL PROCEDURE     • ENDOSCOPY N/A 06/30/2021    Procedure:  ESOPHAGOGASTRODUODENOSCOPY;  Surgeon: Brunner, Mark I, MD;  Location:  [facility];  Service: Gastroenterology;  Laterality: N/A;   • ENDOSCOPY N/A 07/27/2021    Procedure: ESOPHAGOGASTRODUODENOSCOPY;  Surgeon: Mikael Worthy MD;  Location:  [facility];  Service: Gastroenterology;  Laterality: N/A;   • ENDOSCOPY WITH JTUBE N/A 03/16/2022    Procedure: ESOPHAGOGASTRODUODENOSCOPY WITH JEJUNAL TUBE INSERTION;  Surgeon: Thom Glover MD;  Location:  [facility];  Service: Gastroenterology;  Laterality: N/A;   • NO PAST SURGERIES     • UPPER GASTROINTESTINAL ENDOSCOPY           CURRENT MEDICATIONS       Current Facility-Administered Medications:   •  droperidol (INAPSINE) injection 2.5 mg, 2.5 mg, Intravenous, Once, Avtar Waite MD    Current Outpatient Medications:   •  acetaminophen (TYLENOL) 325 MG tablet, Take 2 tablets by mouth Every 4 (Four) Hours As Needed for Mild Pain ., Disp: , Rfl:   •  albuterol sulfate  (90 Base) MCG/ACT inhaler, Inhale 2 puffs Every 4 (Four) Hours As Needed for Wheezing., Disp: 18 g, Rfl: 5  •  amLODIPine (NORVASC) 10 MG tablet, Take 1 tablet by mouth Daily., Disp: 90 tablet, Rfl: 1  •  Ascorbic Acid (VITAMIN C PO), Vitamin C TABS, Disp: , Rfl:   •  aspirin 81 MG EC tablet, Take 1 tablet by mouth Daily., Disp: , Rfl:   •  atorvastatin (LIPITOR) 80 MG tablet, Take 1 tablet by mouth Every Night., Disp: 90 tablet, Rfl: 1  •  Blood Glucose Monitoring Suppl (FreeStyle Lite) w/Device kit, USE UNIT TO CHECK GLUCOSE 4 TIMES DAILY, Disp: , Rfl:   •  calcium carbonate (TUMS) 500 MG chewable tablet, Chew 1,000 mg 3 (Three) Times a Day As Needed for Indigestion or Heartburn., Disp: , Rfl:   •  Continuous Blood Gluc  (Dexcom G6 ) device, 1 kit Every 3 (Three) Months., Disp: 1 each, Rfl: 0  •  Continuous Blood Gluc Sensor (Dexcom G6 Sensor), Every 10 (Ten) Days., Disp: 9 each, Rfl: 3  •  Continuous Blood Gluc Transmit (Dexcom G6 Transmitter) misc, 1 kit  Every 3 (Three) Months., Disp: 1 each, Rfl: 3  •  diphenhydrAMINE (BENADRYL) 25 MG tablet, Take 1 tablet by mouth Every 6 (Six) Hours As Needed for Itching., Disp: 12 tablet, Rfl: 0  •  donepezil (Aricept) 5 MG tablet, Take 1 tablet by mouth Every Night., Disp: 30 tablet, Rfl: 1  •  doxazosin (CARDURA) 1 MG tablet, TAKE 1 TABLET BY MOUTH ONCE DAILY AT NIGHT, Disp: 90 tablet, Rfl: 0  •  estradiol (ESTRACE VAGINAL) 0.1 MG/GM vaginal cream, Insert 1 gm intravaginally twice weekly at bedtime., Disp: 42.5 g, Rfl: 4  •  ferrous sulfate 325 (65 Fe) MG tablet, TAKE 1 TABLET BY MOUTH ONCE DAILY WITH BREAKFAST. TAKE WITH ORANGE JUICE OR VITAMIN C, Disp: 30 tablet, Rfl: 0  •  FLUoxetine (PROzac) 20 MG capsule, Take 1 capsule by mouth Daily., Disp: 90 capsule, Rfl: 1  •  gabapentin (NEURONTIN) 400 MG capsule, Take 1 capsule by mouth 2 (Two) Times a Day As Needed (leg and foot pain)., Disp: 60 capsule, Rfl: 1  •  Gimoti 15 MG/ACT solution, , Disp: , Rfl:   •  glucose blood (Glucose Meter Test) test strip, Use as instructed to check blood glucose levels 3 times daily, Disp: 100 each, Rfl: 5  •  glucose blood test strip, Use as instructed, Disp: 200 each, Rfl: 12  •  glucose monitor monitoring kit, 1 each 4 (Four) Times a Day., Disp: 1 each, Rfl: 0  •  Insulin Pen Needle (BD Pen Needle Cydney U/F) 32G X 4 MM misc, Take 1 each by mouth 4 (Four) Times a Day., Disp: 100 each, Rfl: 5  •  melatonin 5 MG tablet tablet, Take 1 tablet by mouth At Night As Needed (sleep)., Disp: , Rfl:   •  metoclopramide (REGLAN) 10 MG tablet, Take 1 tablet by mouth 4 (Four) Times a Day Before Meals & at Bedtime., Disp: 120 tablet, Rfl: 2  •  metoclopramide (REGLAN) 5 MG tablet, Take 1 tablet by mouth 3 (Three) Times a Day As Needed (vomiting)., Disp: 8 tablet, Rfl: 0  •  mirtazapine (REMERON) 15 MG tablet, TAKE 1 TABLET BY MOUTH ONCE DAILY AT NIGHT, Disp: 90 tablet, Rfl: 0  •  Multiple Vitamins-Minerals (Multivitamin-Minerals) tablet, Take 1 tablet by  mouth Daily., Disp: , Rfl:   •  Multivitamin tablet tablet, Take 1 tablet by mouth Daily., Disp: , Rfl:   •  pantoprazole (PROTONIX) 40 MG EC tablet, Take 1 tablet by mouth once daily, Disp: 30 tablet, Rfl: 5  •  sennosides-docusate (senna-docusate sodium) 8.6-50 MG per tablet, Take 2 tablets by mouth 2 (Two) Times a Day As Needed for Constipation., Disp: 60 tablet, Rfl: 5  •  traMADol (ULTRAM) 50 MG tablet, Take 1 tablet by mouth Every 6 (Six) Hours As Needed for Moderate Pain ., Disp: 28 tablet, Rfl: 2  •  traZODone (DESYREL) 50 MG tablet, TAKE 1 TO 2 TABLETS BY MOUTH AT BEDTIME AS NEEDED FOR SLEEP, Disp: 45 tablet, Rfl: 0  •  vitamin D (ERGOCALCIFEROL) 1.25 MG (87940 UT) capsule capsule, Take 1 capsule by mouth once a week, Disp: 4 capsule, Rfl: 0    ALLERGIES     Patient has no known allergies.    FAMILY HISTORY       Family History   Problem Relation Age of Onset   • Anxiety disorder Other    • Arthritis Other    • ADD / ADHD Other    • Heart disease Other         cardiac disorder   • Depression Other    • Diabetes Other    • Hyperlipidemia Other    • Hypertension Other    • Lung cancer Other    • Osteoporosis Other    • Hypertension Brother    • Diabetes Brother    • Hyperlipidemia Mother    • Hypertension Mother    • Colon cancer Neg Hx           SOCIAL HISTORY       Social History     Socioeconomic History   • Marital status:    Tobacco Use   • Smoking status: Former     Packs/day: 0.25     Years: 30.00     Pack years: 7.50     Types: Cigarettes     Quit date: 5/28/2019     Years since quitting: 3.5   • Smokeless tobacco: Never   • Tobacco comments:     BC PL never smoker    Vaping Use   • Vaping Use: Never used   Substance and Sexual Activity   • Alcohol use: Yes     Alcohol/week: 1.0 standard drink     Types: 1 Glasses of wine per week     Comment: ocassional   • Drug use: No   • Sexual activity: Not Currently     Comment: Single          PHYSICAL EXAM    (up to 7 for level 4, 8 or more for level  "5)     Vitals:    12/06/22 2302 12/06/22 2305 12/07/22 0214 12/07/22 0400   BP:  (!) 195/97 (!) 215/108 (!) 231/112   Pulse: 100  110 102   Resp: 18 22 19   Temp:  98.2 °F (36.8 °C)     TempSrc:  Oral     SpO2: 100%  97% 100%   Weight: 60.3 kg (133 lb)      Height: 172.7 cm (68\")          Physical Exam  General: Awake, alert, no acute distress.  HEENT: Conjunctivae normal.  Neck: Trachea midline.  Cardiac: Heart regular rate, rhythm, no murmurs, rubs, or gallops  Lungs: Lungs are clear to auscultation, there is no wheezing, rhonchi, or rales. There is no use of accessory muscles.  Chest wall: There is no tenderness to palpation over the chest wall or over ribs  Abdomen: Abdomen is soft, nontender, nondistended. There are no firm or pulsatile masses, no rebound rigidity or guarding.   Musculoskeletal: No deformity.  Neuro: Alert and oriented x 4.  Dermatology: Skin is warm and dry  Psych: Mentation is grossly normal, cognition is grossly normal. Affect is appropriate.        DIAGNOSTIC RESULTS     EKG: All EKGs are interpreted by the Emergency Department Physician who either signs or Co-signs this chart in the absence of a cardiologist.    ECG 12 Lead Tachycardia   Preliminary Result   Test Reason : Tachycardia   Blood Pressure :   */*   mmHG   Vent. Rate :  98 BPM     Atrial Rate :  98 BPM      P-R Int : 150 ms          QRS Dur :  78 ms       QT Int : 372 ms       P-R-T Axes :  73  64  76 degrees      QTc Int : 474 ms      Normal sinus rhythm   Right atrial enlargement   Moderate voltage criteria for LVH, may be normal variant   Borderline ECG   When compared with ECG of 20-SEP-2022 16:05,   No significant change was found      Referred By: EDMD           Confirmed By:           LABS:    I have reviewed and interpreted all of the currently available lab results from this visit (if applicable):  Results for orders placed or performed during the hospital encounter of 12/06/22   COVID-19 and FLU A/B PCR - Swab, " Nasopharynx    Specimen: Nasopharynx; Swab   Result Value Ref Range    COVID19 Not Detected Not Detected - Ref. Range    Influenza A PCR Not Detected Not Detected    Influenza B PCR Not Detected Not Detected   Comprehensive Metabolic Panel    Specimen: Blood   Result Value Ref Range    Glucose 350 (H) 65 - 99 mg/dL    BUN 22 8 - 23 mg/dL    Creatinine 1.34 (H) 0.57 - 1.00 mg/dL    Sodium 141 136 - 145 mmol/L    Potassium 3.3 (L) 3.5 - 5.2 mmol/L    Chloride 98 98 - 107 mmol/L    CO2 22.0 22.0 - 29.0 mmol/L    Calcium 10.1 8.6 - 10.5 mg/dL    Total Protein 8.5 6.0 - 8.5 g/dL    Albumin 4.70 3.50 - 5.20 g/dL    ALT (SGPT) 14 1 - 33 U/L    AST (SGOT) 18 1 - 32 U/L    Alkaline Phosphatase 111 39 - 117 U/L    Total Bilirubin 0.6 0.0 - 1.2 mg/dL    Globulin 3.8 gm/dL    A/G Ratio 1.2 g/dL    BUN/Creatinine Ratio 16.4 7.0 - 25.0    Anion Gap 21.0 (H) 5.0 - 15.0 mmol/L    eGFR 44.6 (L) >60.0 mL/min/1.73   Lipase    Specimen: Blood   Result Value Ref Range    Lipase 23 13 - 60 U/L   Urinalysis With Microscopic If Indicated (No Culture) - Urine, Clean Catch    Specimen: Urine, Clean Catch   Result Value Ref Range    Color, UA Yellow Yellow, Straw    Appearance, UA Clear Clear    pH, UA 7.0 5.0 - 8.0    Specific Gravity, UA 1.018 1.001 - 1.030    Glucose, UA >=1000 mg/dL (3+) (A) Negative    Ketones, UA Trace (A) Negative    Bilirubin, UA Negative Negative    Blood, UA Negative Negative    Protein, UA >=300 mg/dL (3+) (A) Negative    Leuk Esterase, UA Negative Negative    Nitrite, UA Negative Negative    Urobilinogen, UA 0.2 E.U./dL 0.2 - 1.0 E.U./dL   Troponin    Specimen: Blood   Result Value Ref Range    Troponin T <0.010 0.000 - 0.030 ng/mL   CBC Auto Differential    Specimen: Blood   Result Value Ref Range    WBC 12.55 (H) 3.40 - 10.80 10*3/mm3    RBC 4.40 3.77 - 5.28 10*6/mm3    Hemoglobin 13.3 12.0 - 15.9 g/dL    Hematocrit 39.2 34.0 - 46.6 %    MCV 89.1 79.0 - 97.0 fL    MCH 30.2 26.6 - 33.0 pg    MCHC 33.9 31.5 - 35.7  g/dL    RDW 12.7 12.3 - 15.4 %    RDW-SD 41.6 37.0 - 54.0 fl    MPV 10.0 6.0 - 12.0 fL    Platelets 385 140 - 450 10*3/mm3    Neutrophil % 91.8 (H) 42.7 - 76.0 %    Lymphocyte % 5.5 (L) 19.6 - 45.3 %    Monocyte % 2.1 (L) 5.0 - 12.0 %    Eosinophil % 0.0 (L) 0.3 - 6.2 %    Basophil % 0.2 0.0 - 1.5 %    Immature Grans % 0.4 0.0 - 0.5 %    Neutrophils, Absolute 11.52 (H) 1.70 - 7.00 10*3/mm3    Lymphocytes, Absolute 0.69 (L) 0.70 - 3.10 10*3/mm3    Monocytes, Absolute 0.26 0.10 - 0.90 10*3/mm3    Eosinophils, Absolute 0.00 0.00 - 0.40 10*3/mm3    Basophils, Absolute 0.03 0.00 - 0.20 10*3/mm3    Immature Grans, Absolute 0.05 0.00 - 0.05 10*3/mm3    nRBC 0.0 0.0 - 0.2 /100 WBC   Beta Hydroxybutyrate Quantitative    Specimen: Blood   Result Value Ref Range    Beta-Hydroxybutyrate Quant 1.567 (H) 0.020 - 0.270 mmol/L   Blood Gas, Venous With Co-Ox    Specimen: Venous Blood   Result Value Ref Range    Site Right Radial     pH, Venous 7.399 7.310 - 7.410 pH Units    pCO2, Venous 38.8 (L) 41.0 - 51.0 mm Hg    pO2, Venous 39.1 27.0 - 53.0 mm Hg    HCO3, Venous 23.9 22.0 - 28.0 mmol/L    Base Excess, Venous -0.7 -2.0 - 2.0 mmol/L    Hemoglobin, Blood Gas 12.5 (L) 14 - 18 g/dL    Oxyhemoglobin Venous 72.2 %    Methemoglobin Venous 0.6 %    Carboxyhemoglobin Venous 1.0 %    CO2 Content 25.1 22 - 33 mmol/L    Temperature 37.0 C    Barometric Pressure for Blood Gas      Modality Room Air     FIO2 21 %    Rate 0 Breaths/minute    PIP 0 cmH2O    IPAP 0     EPAP 0     Note     Urinalysis, Microscopic Only - Urine, Clean Catch    Specimen: Urine, Clean Catch   Result Value Ref Range    RBC, UA 3-6 (A) None Seen, 0-2 /HPF    WBC, UA 0-2 None Seen, 0-2 /HPF    Bacteria, UA None Seen None Seen, Trace /HPF    Squamous Epithelial Cells, UA 0-2 None Seen, 0-2 /HPF    Hyaline Casts, UA None Seen 0 - 6 /LPF    Methodology Automated Microscopy    ECG 12 Lead Tachycardia   Result Value Ref Range    QT Interval 372 ms    QTC Interval 474 ms     "    All other labs were within normal range or not returned as of this dictation.      EMERGENCY DEPARTMENT COURSE and DIFFERENTIAL DIAGNOSIS/MDM:   Vitals:    Vitals:    12/06/22 2302 12/06/22 2305 12/07/22 0214 12/07/22 0400   BP:  (!) 195/97 (!) 215/108 (!) 231/112   Pulse: 100  110 102   Resp: 18 22 19   Temp:  98.2 °F (36.8 °C)     TempSrc:  Oral     SpO2: 100%  97% 100%   Weight: 60.3 kg (133 lb)      Height: 172.7 cm (68\")          ED Course as of 12/07/22 0426   Wed Dec 07, 2022   0402 On reexamination, the patient is feeling significantly better.  I have reexamined her abdomen and remains soft and nontender.  She continues to complain of no abdominal pain.  Her blood pressure is elevated as well as her glucose and she admits she has not been taking any of her medications.  She is hyperglycemic with some ketones but I suspect this is secondary to dehydration.  She is not acidemic and do not feel his presentation is consistent with DKA, no lyte derangement, no evidence of acute hepatitis/pancreatitis. [NS]   0424 I spoke w/ Dr. Kendrick who accepts the pt for admission [NS]   0425 Patient w/ ongoing vomiting on standing. Will admit to hospitalist. [NS]      ED Course User Index  [NS] Avtar Waite MD           MEDICATIONS ADMINISTERED IN ED:  Medications   droperidol (INAPSINE) injection 2.5 mg (2.5 mg Intravenous Not Given 12/7/22 0415)   sodium chloride 0.9 % bolus 1,000 mL (0 mL Intravenous Stopped 12/7/22 0349)   metoclopramide (REGLAN) injection 10 mg (10 mg Intravenous Given 12/7/22 0411)       PROCEDURES:  Procedures    CRITICAL CARE TIME    Total Critical Care time was 0 minutes, excluding separately reportable procedures.   There was a high probability of clinically significant/life threatening deterioration in the patient's condition which required my urgent intervention.      FINAL IMPRESSION      1. Nausea and vomiting, unspecified vomiting type    2. History of diabetic gastroparesis    3. " Hyperglycemia          DISPOSITION/PLAN     ED Disposition     ED Disposition   Decision to Admit    Condition   --    Comment   --                 Avtar Waite MD  Attending Emergency Physician               Avtar Waite MD  12/07/22 0427      Electronically signed by Avtar Waite MD at 12/07/22 0427         Current Facility-Administered Medications   Medication Dose Route Frequency Provider Last Rate Last Admin   • acetaminophen (TYLENOL) tablet 650 mg  650 mg Oral Q4H PRN Eli Zavala APRN        Or   • acetaminophen (TYLENOL) 160 MG/5ML solution 650 mg  650 mg Oral Q4H PRN Eli Zavala APRN        Or   • acetaminophen (TYLENOL) suppository 650 mg  650 mg Rectal Q4H PRN Eli Zavala APRN       • aspirin EC tablet 81 mg  81 mg Oral Daily Eli Zavala APRN   81 mg at 12/08/22 0840   • atorvastatin (LIPITOR) tablet 80 mg  80 mg Oral Nightly Eli Zavala APRN   80 mg at 12/07/22 2151   • sennosides-docusate (PERICOLACE) 8.6-50 MG per tablet 2 tablet  2 tablet Oral BID Eli Zavala APRN   2 tablet at 12/07/22 2150    And   • polyethylene glycol (MIRALAX) packet 17 g  17 g Oral Daily PRN Eli Zavala APRN        And   • bisacodyl (DULCOLAX) EC tablet 5 mg  5 mg Oral Daily PRN Eli Zavala APRN        And   • bisacodyl (DULCOLAX) suppository 10 mg  10 mg Rectal Daily PRN Eli Zavala APRN       • dextrose (D50W) (25 g/50 mL) IV injection 25 g  25 g Intravenous Q15 Min PRN Eli Zavala APRN       • dextrose (GLUTOSE) oral gel 15 g  15 g Oral Q15 Min PRN Eli Zavala APRN       • donepezil (ARICEPT) tablet 5 mg  5 mg Oral Nightly Eli Zavala APRN   5 mg at 12/07/22 2151   • droperidol (INAPSINE) injection 2.5 mg  2.5 mg Intravenous Once Avtar Waite MD       • FLUoxetine (PROzac) capsule 20 mg  20 mg Oral Daily lEi Zavala APRN   20 mg at 12/08/22 0838   • glucagon (human recombinant) (GLUCAGEN DIAGNOSTIC) injection 1 mg  1 mg  Intramuscular Q15 Min PRN Eli Zavala APRN       • heparin (porcine) 5000 UNIT/ML injection 5,000 Units  5,000 Units Subcutaneous Q8H Eli Zavala APRN   5,000 Units at 12/08/22 0637   • influenza vac split quad (FLUZONE,FLUARIX,AFLURIA,FLULAVAL) injection 0.5 mL  0.5 mL Intramuscular During Hospitalization Kavon Cramer DO       • Insulin Lispro (humaLOG) injection 0-7 Units  0-7 Units Subcutaneous TID AC Eli Zavala APRN   4 Units at 12/08/22 0840   • melatonin tablet 5 mg  5 mg Oral Nightly PRN Eli Zavala APRN       • metoclopramide (REGLAN) injection 10 mg  10 mg Intravenous 4x Daily AC & at Bedtime Shirin Swenson MD       • mirtazapine (REMERON) tablet 15 mg  15 mg Oral Nightly Eli Zavala APRN   15 mg at 12/07/22 2151   • multivitamin with minerals 1 tablet  1 tablet Oral Daily Eli Zavala APRN   1 tablet at 12/08/22 0839   • niCARdipine (CARDENE) 25mg in 250mL NS infusion  5-15 mg/hr Intravenous Titrated Eli Zavala APRN   Stopped at 12/07/22 0918   • ondansetron (ZOFRAN) injection 4 mg  4 mg Intravenous Q6H PRN Eli Zavala APRN   4 mg at 12/07/22 1845   • pantoprazole (PROTONIX) EC tablet 40 mg  40 mg Oral Daily Eli Zavala APRN   40 mg at 12/08/22 0840   • potassium chloride (MICRO-K) CR capsule 40 mEq  40 mEq Oral PRN Eli Zavala APRN   40 mEq at 12/07/22 2207    Or   • potassium chloride (KLOR-CON) packet 40 mEq  40 mEq Oral PRN Eli Zavala APRN        Or   • potassium chloride 10 mEq in 100 mL IVPB  10 mEq Intravenous Q1H PRN Eli Zavala APRN       • promethazine (PHENERGAN) 12.5 mg in sodium chloride 0.9 % 50 mL  12.5 mg Intravenous Q4H PRN Shirin Swenson MD       • sodium chloride 0.9 % flush 10 mL  10 mL Intravenous Q12H Eli Zavala APRN   10 mL at 12/08/22 0841   • sodium chloride 0.9 % flush 10 mL  10 mL Intravenous PRN Eli Zavala, HOWIE       • sodium chloride 0.9 % infusion 40 mL  40 mL  Intravenous PRN Eli Zavala APRN       • sodium chloride 0.9 % infusion  100 mL/hr Intravenous Continuous Eli Zavala APRN 100 mL/hr at 12/08/22 0641 100 mL/hr at 12/08/22 0641   • terazosin (HYTRIN) capsule 1 mg  1 mg Oral Nightly Eli Zavala APRN   1 mg at 12/07/22 2152   • traZODone (DESYREL) tablet 50 mg  50 mg Oral Nightly PRN Kavon Cramer,    50 mg at 12/07/22 2208       Lab Results (last 24 hours)     Procedure Component Value Units Date/Time    POC Glucose Once [[id number]]  (Abnormal) Collected: 12/08/22 0703    Specimen: Blood Updated: 12/08/22 0704     Glucose 251 mg/dL      Comment: Meter: [id number] : [id number] Rodolfo Alicea       POC Glucose Once [[id number]]  (Normal) Collected: 12/07/22 2011    Specimen: Blood Updated: 12/07/22 2012     Glucose 125 mg/dL      Comment: Meter: [id number] : [id number] Payam Francois       STAT Lactic Acid, Reflex [[id number]]  (Normal) Collected: 12/07/22 1640    Specimen: Blood Updated: 12/07/22 1737     Lactate 2.0 mmol/L      Comment: Falsely depressed results may occur on samples drawn from patients receiving N-Acetylcysteine (NAC) or Metamizole.       POC Glucose Once [[id number]]  (Abnormal) Collected: 12/07/22 1708    Specimen: Blood Updated: 12/07/22 1710     Glucose 246 mg/dL      Comment: Meter: [id number] : [id number] Rodolfo Alicea       Basic Metabolic Panel [[id number]]  (Abnormal) Collected: 12/07/22 1324    Specimen: Blood Updated: 12/07/22 1403     Glucose 252 mg/dL      BUN 23 mg/dL      Creatinine 1.17 mg/dL      Sodium 144 mmol/L      Potassium 3.0 mmol/L      Chloride 107 mmol/L      CO2 24.0 mmol/L      Calcium 9.6 mg/dL      BUN/Creatinine Ratio 19.7     Anion Gap 13.0 mmol/L      eGFR 52.5 mL/min/1.73      Comment: National Kidney Foundation and American Society of Nephrology (ASN) Task Force recommended calculation based on the Chronic Kidney Disease Epidemiology Collaboration (CKD-EPI) equation refit  without adjustment for race.       Narrative:      GFR Normal >60  Chronic Kidney Disease <60  Kidney Failure <15      STAT Lactic Acid, Reflex [[id number]]  (Abnormal) Collected: 12/07/22 1324    Specimen: Blood Updated: 12/07/22 1403     Lactate 2.3 mmol/L      Comment: Falsely depressed results may occur on samples drawn from patients receiving N-Acetylcysteine (NAC) or Metamizole.       Hemoglobin A1c [[id number]]  (Abnormal) Collected: 12/07/22 1324    Specimen: Blood Updated: 12/07/22 1401     Hemoglobin A1C 6.50 %     Narrative:      Hemoglobin A1C Ranges:    Increased Risk for Diabetes  5.7% to 6.4%  Diabetes                     >= 6.5%  Diabetic Goal                < 7.0%    CBC Auto Differential [[id number]]  (Abnormal) Collected: 12/07/22 1324    Specimen: Blood Updated: 12/07/22 1346     WBC 9.43 10*3/mm3      RBC 3.70 10*6/mm3      Hemoglobin 11.3 g/dL      Hematocrit 32.9 %      MCV 88.9 fL      MCH 30.5 pg      MCHC 34.3 g/dL      RDW 12.8 %      RDW-SD 41.3 fl      MPV 10.3 fL      Platelets 330 10*3/mm3      Neutrophil % 88.4 %      Lymphocyte % 8.0 %      Monocyte % 3.1 %      Eosinophil % 0.0 %      Basophil % 0.1 %      Immature Grans % 0.4 %      Neutrophils, Absolute 8.34 10*3/mm3      Lymphocytes, Absolute 0.75 10*3/mm3      Monocytes, Absolute 0.29 10*3/mm3      Eosinophils, Absolute 0.00 10*3/mm3      Basophils, Absolute 0.01 10*3/mm3      Immature Grans, Absolute 0.04 10*3/mm3      nRBC 0.0 /100 WBC     POC Glucose Once [[id number]]  (Abnormal) Collected: 12/07/22 1214    Specimen: Blood Updated: 12/07/22 1218     Glucose 239 mg/dL      Comment: Meter: [id number] : [id number] Yoav Michelle           Imaging Results (Last 24 Hours)     ** No results found for the last 24 hours. **        Orders (last 24 hrs)      Start     Ordered    12/08/22 1130  metoclopramide (REGLAN) injection 10 mg  4 Times Daily Before Meals & Nightly         12/08/22 1037    12/08/22 1036  promethazine (PHENERGAN) 12.5  mg in sodium chloride 0.9 % 50 mL  Every 4 Hours PRN         12/08/22 1037    12/08/22 0705  POC Glucose Once  PROCEDURE ONCE         12/08/22 0703    12/07/22 2100  atorvastatin (LIPITOR) tablet 80 mg  Nightly         12/07/22 1115    12/07/22 2100  donepezil (ARICEPT) tablet 5 mg  Nightly         12/07/22 1115    12/07/22 2100  terazosin (HYTRIN) capsule 1 mg  Nightly         12/07/22 1115    12/07/22 2100  mirtazapine (REMERON) tablet 15 mg  Nightly         12/07/22 1115    12/07/22 2013  POC Glucose Once  PROCEDURE ONCE         12/07/22 2011 12/07/22 1831  traZODone (DESYREL) tablet 50 mg  Nightly PRN         12/07/22 1831    12/07/22 1800  Oral Care  2 Times Daily       12/07/22 1115    12/07/22 1730  metoclopramide (REGLAN) injection 5 mg  4 Times Daily Before Meals & Nightly,   Status:  Discontinued         12/07/22 1115    12/07/22 1711  POC Glucose Once  PROCEDURE ONCE         12/07/22 1708    12/07/22 1700  POC Glucose TID AC  3 Times Daily Before Meals       12/07/22 1115    12/07/22 1624  STAT Lactic Acid, Reflex  PROCEDURE ONCE         12/07/22 1401    12/07/22 1427  influenza vac split quad (FLUZONE,FLUARIX,AFLURIA,FLULAVAL) injection 0.5 mL  During Hospitalization         12/07/22 1427    12/07/22 1400  heparin (porcine) 5000 UNIT/ML injection 5,000 Units  Every 8 Hours Scheduled         12/07/22 1115    12/07/22 1253  insulin detemir (LEVEMIR) injection 5 Units  Every 12 Hours Scheduled,   Status:  Discontinued         12/07/22 1251    12/07/22 1246  Oxygen Therapy- Nasal Cannula; Titrate for SPO2: 88% - 92%  Continuous         12/07/22 1251    12/07/22 1230  amLODIPine (NORVASC) tablet 10 mg  Daily,   Status:  Discontinued         12/07/22 1115    12/07/22 1230  aspirin EC tablet 81 mg  Daily         12/07/22 1115    12/07/22 1230  FLUoxetine (PROzac) capsule 20 mg  Daily         12/07/22 1115    12/07/22 1230  multivitamin with minerals 1 tablet  Daily         12/07/22 1115    12/07/22 1230   pantoprazole (PROTONIX) EC tablet 40 mg  Daily         12/07/22 1115    12/07/22 1230  Insulin Lispro (humaLOG) injection 0-7 Units  3 Times Daily Before Meals         12/07/22 1115    12/07/22 1230  sennosides-docusate (PERICOLACE) 8.6-50 MG per tablet 2 tablet  2 Times Daily        See Hyperspace for full Linked Orders Report.    12/07/22 1115    12/07/22 1219  POC Glucose Once  PROCEDURE ONCE         12/07/22 1214    12/07/22 1200  Vital Signs  Every 4 Hours       12/07/22 1115    12/07/22 1123  STAT Lactic Acid, Reflex  PROCEDURE ONCE         12/07/22 0856    12/07/22 1117  sodium chloride 0.9 % flush 10 mL  Every 12 Hours Scheduled         12/07/22 1115    12/07/22 1116  Intake & Output  Every Shift       12/07/22 1115    12/07/22 1116  Weigh Patient  Once         12/07/22 1115    12/07/22 1116  Oxygen Therapy- Nasal Cannula; Titrate for SPO2: 90% - 95%  Continuous,   Status:  Canceled         12/07/22 1115    12/07/22 1116  Insert Peripheral IV  Once         12/07/22 1115    12/07/22 1116  Saline Lock & Maintain IV Access  Continuous         12/07/22 1115    12/07/22 1116  Do NOT Hold Basal or Correction Scale Insulin When Patient is NPO, Hold Scheduled Mealtime (Bolus) Insulin if NPO  Continuous         12/07/22 1115    12/07/22 1116  Pulse Oximetry, Continuous  Continuous         12/07/22 1115    12/07/22 1116  Cardiac Monitoring  Continuous        Comments: Follow Standing Orders As Outlined in Process Instructions (Open Order Report to View Full Instructions)    12/07/22 1115    12/07/22 1116  Diet: Liquid Diets, Diabetic Diets; Clear Liquid; Consistent Carbohydrate; Texture: Regular Texture (IDDSI 7); Fluid Consistency: Thin (IDDSI 0)  Diet Effective Now         12/07/22 1115    12/07/22 1116  Basic Metabolic Panel  Morning Draw         12/07/22 1115    12/07/22 1116  CBC Auto Differential  Morning Draw         12/07/22 1115    12/07/22 1116  Hemoglobin A1c  Morning Draw         12/07/22 1115    12/07/22  1116  Patient Currently On Electrolyte Replacement Protocol - Please Refer to MAR for Protocol Details  Misc Nursing Order (Specify)  Daily      Comments: Patient Currently On Electrolyte Replacement Protocol - Please Refer to MAR for Protocol Details    12/07/22 1115    12/07/22 1115  potassium chloride (MICRO-K) CR capsule 40 mEq  As Needed        See Hyperspace for full Linked Orders Report.    12/07/22 1115    12/07/22 1115  potassium chloride (KLOR-CON) packet 40 mEq  As Needed        See Hyperspace for full Linked Orders Report.    12/07/22 1115    12/07/22 1115  potassium chloride 10 mEq in 100 mL IVPB  Every 1 Hour PRN        See Hyperspace for full Linked Orders Report.    12/07/22 1115    12/07/22 1115  acetaminophen (TYLENOL) tablet 650 mg  Every 4 Hours PRN        See Hyperspace for full Linked Orders Report.    12/07/22 1115    12/07/22 1115  acetaminophen (TYLENOL) 160 MG/5ML solution 650 mg  Every 4 Hours PRN        See Hyperspace for full Linked Orders Report.    12/07/22 1115    12/07/22 1115  acetaminophen (TYLENOL) suppository 650 mg  Every 4 Hours PRN        See Hyperspace for full Linked Orders Report.    12/07/22 1115    12/07/22 1115  polyethylene glycol (MIRALAX) packet 17 g  Daily PRN        See Hyperspace for full Linked Orders Report.    12/07/22 1115    12/07/22 1115  bisacodyl (DULCOLAX) EC tablet 5 mg  Daily PRN        See Hyperspace for full Linked Orders Report.    12/07/22 1115    12/07/22 1115  bisacodyl (DULCOLAX) suppository 10 mg  Daily PRN        See Hyperspace for full Linked Orders Report.    12/07/22 1115    12/07/22 1115  ondansetron (ZOFRAN) injection 4 mg  Every 6 Hours PRN         12/07/22 1115    12/07/22 1115  sodium chloride 0.9 % flush 10 mL  As Needed         12/07/22 1115    12/07/22 1115  sodium chloride 0.9 % infusion 40 mL  As Needed         12/07/22 1115    12/07/22 1115  dextrose (GLUTOSE) oral gel 15 g  Every 15 Minutes PRN         12/07/22 1115    12/07/22  1115  dextrose (D50W) (25 g/50 mL) IV injection 25 g  Every 15 Minutes PRN         12/07/22 1115    12/07/22 1115  glucagon (human recombinant) (GLUCAGEN DIAGNOSTIC) injection 1 mg  Every 15 Minutes PRN         12/07/22 1115    12/07/22 1115  melatonin tablet 5 mg  Nightly PRN         12/07/22 1115    12/07/22 0800  POC Glucose Finger Q4H  Every 4 Hours       12/07/22 0547    12/07/22 0516  ondansetron (ZOFRAN) injection 4 mg  Every 6 Hours PRN,   Status:  Discontinued         12/07/22 0516    12/07/22 0451  niCARdipine (CARDENE) 25mg in 250mL NS infusion  Titrated         12/07/22 0449    12/07/22 0451  sodium chloride 0.9 % infusion  Continuous         12/07/22 0449    12/07/22 0403  droperidol (INAPSINE) injection 2.5 mg  Once         12/07/22 0401    Unscheduled  Follow Hypoglycemia Standing Orders For Blood Glucose <70 & Notify Provider of Treatment  As Needed      Comments: Follow Hypoglycemia Orders As Outlined in Process Instructions (Open Order Report to View Full Instructions)  Notify Provider Any Time Hypoglycemia Treatment is Administered    12/07/22 1115    Unscheduled  Up With Assistance  As Needed       12/07/22 1115    Unscheduled  Initiate & Follow Electrolyte Replacement Protocol  As Needed       12/07/22 1115    Unscheduled  Magnesium  As Needed       12/07/22 1115    Unscheduled  Potassium  As Needed       12/07/22 1115                Physician Progress Notes (last 24 hours)  Notes from 12/07/22 1103 through 12/08/22 1103   No notes of this type exist for this encounter.         Consult Notes (last 24 hours)  Notes from 12/07/22 1103 through 12/08/22 1103   No notes of this type exist for this encounter.

## 2022-12-08 NOTE — PROGRESS NOTES
[facility] Medicine Services  PROGRESS NOTE    Patient Name: Thelma Michael  : 1960  MRN: [id number]    Date of Admission: 2022  Primary Care Physician: Monet Howe APRN    Subjective   Subjective     CC:  Follow-up for diabetic gastroparesis    HPI:  I seen and evaluated the patient this morning.  Feeling sick.  Still having nausea and occasional vomiting.  Unable to tolerate clear liquid diet.  Received bisacodyl overnight and had multiple bowel movements this morning.  Blood pressure has been high overnight but within normal limits early this morning.    ROS:  General : no fevers, chills  CVS: No chest pain, palpitations  Respiratory: No cough, dyspnea  GI:++ N/V/D, minimal abd pain  10 point review of systems is negative except for what is mentioned in HPI    Objective   Objective     Vital Signs:   Temp:  [98.8 °F (37.1 °C)-99.2 °F (37.3 °C)] 98.8 °F (37.1 °C)  Heart Rate:  [101-116] 103  Resp:  [16-18] 18  BP: (106-190)/(59-87) 190/87     Physical Exam:  General: Chronically ill looking, uncomfortable from nausea, not in distress, conversant and cooperative  Head: Atraumatic and normocephalic  Eyes: No Icterus. No pallor  Ears:  Ears appear intact with no abnormalities noted  Throat: No oral lesions, no thrush  Neck: Supple, trachea midline  Lungs: Clear to auscultation bilaterally, equal air entry, no wheezing or crackles  Heart:  Normal S1 and S2, no murmur, no gallop, No JVD, no lower extremity swelling  Abdomen:  Soft, no tenderness, no organomegaly, normal bowel sounds, no organomegaly  Extremities: pulses equal bilaterally  Skin: No bleeding, bruising or rash, normal skin turgor and elasticity  Neurologic: Cranial nerves appear intact with no evidence of facial asymmetry, normal motor and sensory functions in all 4 extremities  Psych: Alert and oriented x 3, normal mood    Results Reviewed:  LAB RESULTS:      Lab 22  1640 22  4314  12/07/22  0823 12/07/22  0507 12/07/22  0128   WBC  --  9.43  --   --  12.55*   HEMOGLOBIN  --  11.3*  --   --  13.3   HEMATOCRIT  --  32.9*  --   --  39.2   PLATELETS  --  330  --   --  385   NEUTROS ABS  --  8.34*  --   --  11.52*   IMMATURE GRANS (ABS)  --  0.04  --   --  0.05   LYMPHS ABS  --  0.75  --   --  0.69*   MONOS ABS  --  0.29  --   --  0.26   EOS ABS  --  0.00  --   --  0.00   MCV  --  88.9  --   --  89.1   PROCALCITONIN  --   --   --   --  0.07   LACTATE 2.0 2.3* 5.6* 3.9*  --          Lab 12/07/22  1324 12/07/22  0128   SODIUM 144 141   POTASSIUM 3.0* 3.3*   CHLORIDE 107 98   CO2 24.0 22.0   ANION GAP 13.0 21.0*   BUN 23 22   CREATININE 1.17* 1.34*   EGFR 52.5* 44.6*   GLUCOSE 252* 350*   CALCIUM 9.6 10.1   MAGNESIUM  --  1.7   HEMOGLOBIN A1C 6.50*  --          Lab 12/07/22  0128   TOTAL PROTEIN 8.5   ALBUMIN 4.70   GLOBULIN 3.8   ALT (SGPT) 14   AST (SGOT) 18   BILIRUBIN 0.6   ALK PHOS 111   LIPASE 23         Lab 12/07/22  0128   TROPONIN T <0.010                 Lab 12/07/22  0357   FIO2 21   CARBOXYHEMOGLOBIN (VENOUS) 1.0     Brief Urine Lab Results  (Last result in the past 365 days)      Color   Clarity   Blood   Leuk Est   Nitrite   Protein   CREAT   Urine HCG        12/07/22 0400 Yellow   Clear   Negative   Negative   Negative   >=300 mg/dL (3+)                 Microbiology Results Abnormal     Procedure Component Value - Date/Time    COVID PRE-OP / PRE-PROCEDURE SCREENING ORDER (NO ISOLATION) - Swab, Nasopharynx [[id number]]  (Normal) Collected: 12/06/22 9561    Lab Status: Final result Specimen: Swab from Nasopharynx Updated: 12/07/22 0022    Narrative:      The following orders were created for panel order COVID PRE-OP / PRE-PROCEDURE SCREENING ORDER (NO ISOLATION) - Swab, Nasopharynx.  Procedure                               Abnormality         Status                     ---------                               -----------         ------                     COVID-19 and FLU A/B  PCR...[[id number]]  Normal              Final result                 Please view results for these tests on the individual orders.    COVID-19 and FLU A/B PCR - Swab, Nasopharynx [[id number]]  (Normal) Collected: 12/06/22 2333    Lab Status: Final result Specimen: Swab from Nasopharynx Updated: 12/07/22 0022     COVID19 Not Detected     Influenza A PCR Not Detected     Influenza B PCR Not Detected    Narrative:      Fact sheet for providers: https://www.fda.gov/media/648382/download    Fact sheet for patients: https://www.fda.gov/media/962297/download    Test performed by PCR.          XR Chest 1 View    Result Date: 12/7/2022  EXAMINATION: XR CHEST 1 VW DATE: 12/7/2022 4:36 AM INDICATION:  Leukocytosis, nausea and vomiting; COMPARISON:  September 20, 2022 FINDINGS: No focal consolidation, pleural effusion, or pneumothorax. Cardiomediastinal silhouette  unchanged. No acute osseous abnormality.     Impression: 1.  No acute cardiopulmonary process. Electronically signed by:  Day Rivera M.D.  12/7/2022 3:35 AM Mountain Time      Results for orders placed during the hospital encounter of 11/19/19    Adult Transesophageal Echo (LIZANDRO) W/ Cont if Necessary Per Protocol    Interpretation Summary  · Left ventricular systolic function is normal. Estimated EF = 65%.  · Left ventricular wall thickness is consistent with hypertrophy.  · Small patent foramen ovale present. Saline test results are positive with valsalva manuever.  · There is mild mitral valve prolapse of the anterior mitral leaflet.  · Mild tricuspid valve regurgitation is present.  · Estimated right ventricular systolic pressure from tricuspid regurgitation is mildly elevated (35-45 mmHg).  · There is mild plaque in the descending aorta present.      I have reviewed the medications:  Scheduled Meds:amLODIPine, 10 mg, Oral, Daily  aspirin, 81 mg, Oral, Daily  atorvastatin, 80 mg, Oral, Nightly  donepezil, 5 mg, Oral, Nightly  droperidol, 2.5 mg, Intravenous,  Once  FLUoxetine, 20 mg, Oral, Daily  heparin (porcine), 5,000 Units, Subcutaneous, Q8H  insulin lispro, 0-7 Units, Subcutaneous, TID AC  metoclopramide, 5 mg, Intravenous, 4x Daily AC & at Bedtime  mirtazapine, 15 mg, Oral, Nightly  multivitamin with minerals, 1 tablet, Oral, Daily  pantoprazole, 40 mg, Oral, Daily  senna-docusate sodium, 2 tablet, Oral, BID  sodium chloride, 10 mL, Intravenous, Q12H  terazosin, 1 mg, Oral, Nightly      Continuous Infusions:niCARdipine, 5-15 mg/hr, Last Rate: Stopped (12/07/22 0918)  sodium chloride, 100 mL/hr, Last Rate: 100 mL/hr (12/08/22 0641)      PRN Meds:.•  acetaminophen **OR** acetaminophen **OR** acetaminophen  •  senna-docusate sodium **AND** polyethylene glycol **AND** bisacodyl **AND** bisacodyl  •  dextrose  •  dextrose  •  glucagon (human recombinant)  •  influenza vaccine  •  melatonin  •  ondansetron  •  potassium chloride **OR** potassium chloride **OR** potassium chloride  •  sodium chloride  •  sodium chloride  •  traZODone    Assessment & Plan   Assessment & Plan     Active Hospital Problems    Diagnosis  POA   • **Nausea and vomiting, unspecified vomiting type [R11.2]  Yes   • Elevated serum creatinine [R79.89]  Yes   • Leukocytosis [D72.829]  Yes   • Gastroparesis [K31.84]  Yes   • Uncontrolled type 1 diabetes mellitus with hyperglycemia (HCC) [E10.65]  Yes   • Nausea & vomiting [R11.2]  Yes   • Hypertensive urgency [I16.0]  Yes   • Personal history of cardiac murmur [Z86.79]  Not Applicable   • Hyperlipidemia [E78.5]  Yes   • Hypertension [I10]  Yes      Resolved Hospital Problems   No resolved problems to display.        Brief Hospital Course to date:  Thelma Michael is a 61 y.o. female with past medical history of type 1 diabetes on insulin pump, severe diabetic gastroparesis status post gastric stimulator implantation, following with gastroparesis clinic in [place], essential hypertension, dyslipidemia, Tobacco use disorder who presented to the  hospital with intractable nausea and vomiting and worsening fatigue    Assessment and plan:  Intractable nausea and vomiting  Possible diabetic gastroparesis flare  · Still having nausea and vomiting.  Zofran does not seem to be helping much.  We will add Phenergan  · Add IV Reglan  · Continue clear liquid diet and encourage p.o. intake  · Continue IV fluids  · Check and replenish electrolytes per protocol  · Check GI PCR panel  · If no improvement by tomorrow, will consider GI consultation     Leukocytosis  Likely reactive, improved and returned to baseline, continue to monitor CBC     Hypertensive Urgency   · Was elevated on admission.  · Currently controlled with amlodipine     Mild RAFAT  · Likely secondary to dehydration  · Continue IV fluids and monitor kidney functions closely     Hypokalemia  · Replace per protocol     DM type 1  · A1c 6.5%  · Insulin sliding scale for now given her poor oral intake      Expected Discharge Location and Transportation: Home  Expected Discharge Date: 12/10/2020    DVT prophylaxis:  Medical DVT prophylaxis orders are present.     AM-PAC 6 Clicks Score (PT): 24 (12/07/22 7494)    CODE STATUS:   Code Status and Medical Interventions:   Ordered at: 12/07/22 0501     Code Status (Patient has no pulse and is not breathing):    CPR (Attempt to Resuscitate)     Medical Interventions (Patient has pulse or is breathing):    Full Support       Shirin Swenson MD  12/08/22

## 2022-12-09 PROBLEM — R82.71 BACTERIURIA: Status: RESOLVED | Noted: 2022-03-13 | Resolved: 2022-12-09

## 2022-12-09 PROBLEM — I48.92 ATRIAL FIBRILLATION AND FLUTTER (HCC): Status: ACTIVE | Noted: 2022-12-09

## 2022-12-09 PROBLEM — R10.33 PERIUMBILICAL ABDOMINAL PAIN: Status: RESOLVED | Noted: 2022-03-28 | Resolved: 2022-12-09

## 2022-12-09 PROBLEM — Q21.12 PFO (PATENT FORAMEN OVALE): Status: ACTIVE | Noted: 2022-12-09

## 2022-12-09 PROBLEM — E43 SEVERE MALNUTRITION: Status: RESOLVED | Noted: 2021-05-01 | Resolved: 2022-12-09

## 2022-12-09 PROBLEM — E83.52 HYPERCALCEMIA: Status: RESOLVED | Noted: 2022-02-11 | Resolved: 2022-12-09

## 2022-12-09 PROBLEM — D75.839 THROMBOCYTOSIS: Status: RESOLVED | Noted: 2022-02-11 | Resolved: 2022-12-09

## 2022-12-09 PROBLEM — R33.9 URINARY RETENTION: Status: RESOLVED | Noted: 2020-04-15 | Resolved: 2022-12-09

## 2022-12-09 PROBLEM — I48.91 ATRIAL FIBRILLATION AND FLUTTER (HCC): Status: ACTIVE | Noted: 2022-12-09

## 2022-12-09 PROBLEM — E83.42 HYPOMAGNESEMIA: Status: RESOLVED | Noted: 2022-03-26 | Resolved: 2022-12-09

## 2022-12-09 PROBLEM — R42 VERTIGO: Status: RESOLVED | Noted: 2020-11-30 | Resolved: 2022-12-09

## 2022-12-09 LAB
ALBUMIN SERPL-MCNC: 4.2 G/DL (ref 3.5–5.2)
ALBUMIN/GLOB SERPL: 1.6 G/DL
ALP SERPL-CCNC: 78 U/L (ref 39–117)
ALT SERPL W P-5'-P-CCNC: 12 U/L (ref 1–33)
ANION GAP SERPL CALCULATED.3IONS-SCNC: 14 MMOL/L (ref 5–15)
AST SERPL-CCNC: 13 U/L (ref 1–32)
BASOPHILS # BLD AUTO: 0.01 10*3/MM3 (ref 0–0.2)
BASOPHILS NFR BLD AUTO: 0.1 % (ref 0–1.5)
BILIRUB SERPL-MCNC: 0.5 MG/DL (ref 0–1.2)
BUN SERPL-MCNC: 15 MG/DL (ref 8–23)
BUN/CREAT SERPL: 15.2 (ref 7–25)
CALCIUM SPEC-SCNC: 9.3 MG/DL (ref 8.6–10.5)
CHLORIDE SERPL-SCNC: 109 MMOL/L (ref 98–107)
CO2 SERPL-SCNC: 22 MMOL/L (ref 22–29)
CREAT SERPL-MCNC: 0.99 MG/DL (ref 0.57–1)
DEPRECATED RDW RBC AUTO: 42.9 FL (ref 37–54)
EGFRCR SERPLBLD CKD-EPI 2021: 64.2 ML/MIN/1.73
EOSINOPHIL # BLD AUTO: 0 10*3/MM3 (ref 0–0.4)
EOSINOPHIL NFR BLD AUTO: 0 % (ref 0.3–6.2)
ERYTHROCYTE [DISTWIDTH] IN BLOOD BY AUTOMATED COUNT: 13.3 % (ref 12.3–15.4)
GLOBULIN UR ELPH-MCNC: 2.6 GM/DL
GLUCOSE BLDC GLUCOMTR-MCNC: 134 MG/DL (ref 70–130)
GLUCOSE BLDC GLUCOMTR-MCNC: 175 MG/DL (ref 70–130)
GLUCOSE BLDC GLUCOMTR-MCNC: 191 MG/DL (ref 70–130)
GLUCOSE BLDC GLUCOMTR-MCNC: 203 MG/DL (ref 70–130)
GLUCOSE BLDC GLUCOMTR-MCNC: 208 MG/DL (ref 70–130)
GLUCOSE BLDC GLUCOMTR-MCNC: 252 MG/DL (ref 70–130)
GLUCOSE SERPL-MCNC: 169 MG/DL (ref 65–99)
HCT VFR BLD AUTO: 30 % (ref 34–46.6)
HGB BLD-MCNC: 10.7 G/DL (ref 12–15.9)
IMM GRANULOCYTES # BLD AUTO: 0.05 10*3/MM3 (ref 0–0.05)
IMM GRANULOCYTES NFR BLD AUTO: 0.5 % (ref 0–0.5)
LYMPHOCYTES # BLD AUTO: 1.39 10*3/MM3 (ref 0.7–3.1)
LYMPHOCYTES NFR BLD AUTO: 13.9 % (ref 19.6–45.3)
MAGNESIUM SERPL-MCNC: 1.7 MG/DL (ref 1.6–2.4)
MCH RBC QN AUTO: 32 PG (ref 26.6–33)
MCHC RBC AUTO-ENTMCNC: 35.7 G/DL (ref 31.5–35.7)
MCV RBC AUTO: 89.8 FL (ref 79–97)
MONOCYTES # BLD AUTO: 0.59 10*3/MM3 (ref 0.1–0.9)
MONOCYTES NFR BLD AUTO: 5.9 % (ref 5–12)
NEUTROPHILS NFR BLD AUTO: 7.99 10*3/MM3 (ref 1.7–7)
NEUTROPHILS NFR BLD AUTO: 79.6 % (ref 42.7–76)
NRBC BLD AUTO-RTO: 0 /100 WBC (ref 0–0.2)
PHOSPHATE SERPL-MCNC: 2.2 MG/DL (ref 2.5–4.5)
PLATELET # BLD AUTO: 284 10*3/MM3 (ref 140–450)
PMV BLD AUTO: 10.9 FL (ref 6–12)
POTASSIUM SERPL-SCNC: 3.2 MMOL/L (ref 3.5–5.2)
POTASSIUM SERPL-SCNC: 3.8 MMOL/L (ref 3.5–5.2)
PROT SERPL-MCNC: 6.8 G/DL (ref 6–8.5)
QT INTERVAL: 364 MS
QT INTERVAL: 372 MS
QTC INTERVAL: 474 MS
QTC INTERVAL: 555 MS
RBC # BLD AUTO: 3.34 10*6/MM3 (ref 3.77–5.28)
SODIUM SERPL-SCNC: 145 MMOL/L (ref 136–145)
TROPONIN T SERPL-MCNC: <0.01 NG/ML (ref 0–0.03)
WBC NRBC COR # BLD: 10.03 10*3/MM3 (ref 3.4–10.8)

## 2022-12-09 PROCEDURE — 0 MAGNESIUM SULFATE 4 GM/100ML SOLUTION: Performed by: INTERNAL MEDICINE

## 2022-12-09 PROCEDURE — 80053 COMPREHEN METABOLIC PANEL: CPT | Performed by: INTERNAL MEDICINE

## 2022-12-09 PROCEDURE — 82962 GLUCOSE BLOOD TEST: CPT

## 2022-12-09 PROCEDURE — 99222 1ST HOSP IP/OBS MODERATE 55: CPT | Performed by: INTERNAL MEDICINE

## 2022-12-09 PROCEDURE — 99232 SBSQ HOSP IP/OBS MODERATE 35: CPT | Performed by: INTERNAL MEDICINE

## 2022-12-09 PROCEDURE — 84484 ASSAY OF TROPONIN QUANT: CPT | Performed by: INTERNAL MEDICINE

## 2022-12-09 PROCEDURE — 85025 COMPLETE CBC W/AUTO DIFF WBC: CPT | Performed by: INTERNAL MEDICINE

## 2022-12-09 PROCEDURE — 84132 ASSAY OF SERUM POTASSIUM: CPT | Performed by: INTERNAL MEDICINE

## 2022-12-09 PROCEDURE — 25010000002 METOCLOPRAMIDE PER 10 MG: Performed by: INTERNAL MEDICINE

## 2022-12-09 PROCEDURE — 93010 ELECTROCARDIOGRAM REPORT: CPT | Performed by: INTERNAL MEDICINE

## 2022-12-09 PROCEDURE — 63710000001 INSULIN LISPRO (HUMAN) PER 5 UNITS: Performed by: NURSE PRACTITIONER

## 2022-12-09 PROCEDURE — 25010000002 ENOXAPARIN PER 10 MG: Performed by: PHYSICIAN ASSISTANT

## 2022-12-09 PROCEDURE — 84100 ASSAY OF PHOSPHORUS: CPT | Performed by: INTERNAL MEDICINE

## 2022-12-09 PROCEDURE — 25010000002 HEPARIN (PORCINE) PER 1000 UNITS: Performed by: NURSE PRACTITIONER

## 2022-12-09 PROCEDURE — 83735 ASSAY OF MAGNESIUM: CPT | Performed by: INTERNAL MEDICINE

## 2022-12-09 PROCEDURE — 93005 ELECTROCARDIOGRAM TRACING: CPT | Performed by: INTERNAL MEDICINE

## 2022-12-09 RX ORDER — DILTIAZEM HYDROCHLORIDE 5 MG/ML
20 INJECTION INTRAVENOUS ONCE
Status: COMPLETED | OUTPATIENT
Start: 2022-12-09 | End: 2022-12-09

## 2022-12-09 RX ORDER — CLONIDINE HYDROCHLORIDE 0.1 MG/1
0.1 TABLET ORAL EVERY 8 HOURS PRN
Status: DISCONTINUED | OUTPATIENT
Start: 2022-12-09 | End: 2022-12-12 | Stop reason: HOSPADM

## 2022-12-09 RX ORDER — MEGESTROL ACETATE 40 MG/ML
400 SUSPENSION ORAL DAILY
Status: DISCONTINUED | OUTPATIENT
Start: 2022-12-09 | End: 2022-12-12 | Stop reason: HOSPADM

## 2022-12-09 RX ORDER — MAGNESIUM SULFATE HEPTAHYDRATE 40 MG/ML
4 INJECTION, SOLUTION INTRAVENOUS ONCE
Status: COMPLETED | OUTPATIENT
Start: 2022-12-09 | End: 2022-12-09

## 2022-12-09 RX ORDER — ENOXAPARIN SODIUM 100 MG/ML
1 INJECTION SUBCUTANEOUS EVERY 12 HOURS
Status: DISCONTINUED | OUTPATIENT
Start: 2022-12-09 | End: 2022-12-12

## 2022-12-09 RX ORDER — METOPROLOL TARTRATE 5 MG/5ML
2.5 INJECTION INTRAVENOUS ONCE
Status: COMPLETED | OUTPATIENT
Start: 2022-12-09 | End: 2022-12-09

## 2022-12-09 RX ORDER — LOSARTAN POTASSIUM 50 MG/1
50 TABLET ORAL
Status: DISCONTINUED | OUTPATIENT
Start: 2022-12-09 | End: 2022-12-12 | Stop reason: HOSPADM

## 2022-12-09 RX ADMIN — MIRTAZAPINE 15 MG: 15 TABLET, FILM COATED ORAL at 20:09

## 2022-12-09 RX ADMIN — METOPROLOL TARTRATE 2.5 MG: 5 INJECTION INTRAVENOUS at 08:39

## 2022-12-09 RX ADMIN — PANTOPRAZOLE SODIUM 40 MG: 40 TABLET, DELAYED RELEASE ORAL at 08:40

## 2022-12-09 RX ADMIN — TRAZODONE HYDROCHLORIDE 50 MG: 50 TABLET ORAL at 20:09

## 2022-12-09 RX ADMIN — HEPARIN SODIUM 5000 UNITS: 5000 INJECTION INTRAVENOUS; SUBCUTANEOUS at 13:52

## 2022-12-09 RX ADMIN — MAGNESIUM SULFATE HEPTAHYDRATE 4 G: 40 INJECTION, SOLUTION INTRAVENOUS at 15:18

## 2022-12-09 RX ADMIN — METOCLOPRAMIDE 10 MG: 5 INJECTION, SOLUTION INTRAMUSCULAR; INTRAVENOUS at 11:46

## 2022-12-09 RX ADMIN — LOSARTAN POTASSIUM 50 MG: 50 TABLET, FILM COATED ORAL at 15:18

## 2022-12-09 RX ADMIN — Medication 5 MG: at 20:09

## 2022-12-09 RX ADMIN — METOCLOPRAMIDE 10 MG: 5 INJECTION, SOLUTION INTRAMUSCULAR; INTRAVENOUS at 20:09

## 2022-12-09 RX ADMIN — Medication 10 ML: at 08:40

## 2022-12-09 RX ADMIN — POTASSIUM CHLORIDE 40 MEQ: 750 CAPSULE, EXTENDED RELEASE ORAL at 13:52

## 2022-12-09 RX ADMIN — METOPROLOL TARTRATE 25 MG: 25 TABLET, FILM COATED ORAL at 20:09

## 2022-12-09 RX ADMIN — METOCLOPRAMIDE 10 MG: 5 INJECTION, SOLUTION INTRAMUSCULAR; INTRAVENOUS at 16:46

## 2022-12-09 RX ADMIN — ASPIRIN 81 MG: 81 TABLET, COATED ORAL at 08:39

## 2022-12-09 RX ADMIN — FLUOXETINE 20 MG: 20 CAPSULE ORAL at 08:39

## 2022-12-09 RX ADMIN — Medication 10 ML: at 20:11

## 2022-12-09 RX ADMIN — INSULIN LISPRO 4 UNITS: 100 INJECTION, SOLUTION INTRAVENOUS; SUBCUTANEOUS at 08:39

## 2022-12-09 RX ADMIN — ENOXAPARIN SODIUM 60 MG: 60 INJECTION SUBCUTANEOUS at 17:24

## 2022-12-09 RX ADMIN — ATORVASTATIN CALCIUM 80 MG: 40 TABLET, FILM COATED ORAL at 20:09

## 2022-12-09 RX ADMIN — Medication 1 TABLET: at 08:40

## 2022-12-09 RX ADMIN — INSULIN LISPRO 2 UNITS: 100 INJECTION, SOLUTION INTRAVENOUS; SUBCUTANEOUS at 16:46

## 2022-12-09 RX ADMIN — INSULIN LISPRO 2 UNITS: 100 INJECTION, SOLUTION INTRAVENOUS; SUBCUTANEOUS at 11:46

## 2022-12-09 RX ADMIN — MEGESTROL ACETATE 400 MG: 400 SUSPENSION ORAL at 15:37

## 2022-12-09 RX ADMIN — DONEPEZIL HYDROCHLORIDE 5 MG: 10 TABLET, FILM COATED ORAL at 20:09

## 2022-12-09 RX ADMIN — DILTIAZEM HYDROCHLORIDE 5 MG/HR: 5 INJECTION, SOLUTION INTRAVENOUS at 16:47

## 2022-12-09 RX ADMIN — DILTIAZEM HYDROCHLORIDE 20 MG: 5 INJECTION INTRAVENOUS at 15:37

## 2022-12-09 RX ADMIN — TERAZOSIN HYDROCHLORIDE 1 MG: 1 CAPSULE ORAL at 20:09

## 2022-12-09 RX ADMIN — POTASSIUM CHLORIDE 40 MEQ: 750 CAPSULE, EXTENDED RELEASE ORAL at 17:24

## 2022-12-09 RX ADMIN — METOCLOPRAMIDE 10 MG: 5 INJECTION, SOLUTION INTRAMUSCULAR; INTRAVENOUS at 08:38

## 2022-12-09 NOTE — CONSULTS
[facility] Cardiology         Date of Hospital Visit: 22      Place of Service: [facility]    Patient Name: Thelma Michael  :1960    Referral Provider: Hospitalist  Primary Care Provider: Monet Howe APRN      Chief complaint/Reason for Consultation:  Atrial Flutter         Problem List:  Active Hospital Problems    Diagnosis  POA   • **Gastroparesis [K31.84]  Yes   • Atrial fibrillation and flutter (HCC) [I48.91, I48.92]  Yes     Priority: High     · Echocardiogram, 2019: Left atrium 2.8 cm. Left ventricular systolic function is hyperdynamic (EF > 70). Left ventricular diastolic dysfunction (grade I) consistent with impaired relaxation. Left ventricular wall thickness is consistent with mild concentric hypertrophy.     • Hypertensive urgency [I16.0]  Yes     Priority: High   • Heart valve disease [I38]  Yes     Priority: Medium     · LIZANDRO, 2019: There is mild mitral valve prolapse of the anterior mitral leaflet. Mild tricuspid valve regurgitation is present. Estimated right ventricular systolic pressure from tricuspid regurgitation is mildly elevated (35-45 mmHg).     • Hyperlipidemia [E78.5]  Yes     Priority: Medium   • Hypertension [I10]  Yes     Priority: Medium   • PFO (patent foramen ovale) [Q21.12]  Not Applicable     Priority: Low     · Echo, 2019: Small patent foramen ovale present. Saline test results are positive with valsalva manuever.     • Hypokalemia [E87.6]  Yes     Priority: Low   • Elevated serum creatinine [R79.89]  Yes   • Leukocytosis [D72.829]  Yes   • History of TIAs [Z86.73]  Not Applicable   • Uncontrolled type 1 diabetes mellitus with hyperglycemia (HCC) [E10.65]  Yes     dx'd age 38 .      • Nausea & vomiting [R11.2]  Yes   • Tobacco use [Z72.0]  Yes           History of Present Illness:  This is a 61-year-old hypertensive, dyslipidemic diabetic female with no significant prior cardiac history.  She had an  echocardiogram in 2019 suggestive of PFO which was confirmed by LIZANDRO.  Records indicate that an event recorder was ordered for further evaluation but that exam was not completed.  She has a long history of diabetic gastroparesis.  She was admitted with recurrent nausea and vomiting secondary to gastroparesis.  It made it very difficult for her to keep her medications down.  Therefore, she presented with accelerated hypertension.  Earlier today she had sudden onset of atrial fibrillation with RVR.  Her potassium today is 3.2, up from 3 yesterday.  She can feel her heart beating fast and irregularly, has felt this maybe once before but nothing recent.  No episodes that she has noticed in the last year.  She denies any chest pain.          Past Surgical History:   Procedure Laterality Date   • CAPSULE ENDOSCOPY  07/27/2021    Procedure: PILLCAM DEPLOYMENT;  Surgeon: Mikael Worthy MD;  Location:  [facility];  Service: Gastroenterology;;   • COLONOSCOPY     • COLONOSCOPY N/A 07/27/2021    Procedure: COLONOSCOPY;  Surgeon: Mikael Worthy MD;  Location:  [facility];  Service: Gastroenterology;  Laterality: N/A;   • DENTAL PROCEDURE     • ENDOSCOPY N/A 06/30/2021    Procedure: ESOPHAGOGASTRODUODENOSCOPY;  Surgeon: Brunner, Mark I, MD;  Location:  [facility];  Service: Gastroenterology;  Laterality: N/A;   • ENDOSCOPY N/A 07/27/2021    Procedure: ESOPHAGOGASTRODUODENOSCOPY;  Surgeon: Mikael Worthy MD;  Location:  [facility];  Service: Gastroenterology;  Laterality: N/A;   • ENDOSCOPY WITH JTUBE N/A 03/16/2022    Procedure: ESOPHAGOGASTRODUODENOSCOPY WITH JEJUNAL TUBE INSERTION;  Surgeon: Thom Glover MD;  Location:  [facility];  Service: Gastroenterology;  Laterality: N/A;   • NO PAST SURGERIES     • UPPER GASTROINTESTINAL ENDOSCOPY         No Known Allergies    Current Outpatient Medications   Medication Instructions   • acetaminophen (TYLENOL) 650 mg, Oral, Every 4 Hours PRN    • albuterol sulfate  (90 Base) MCG/ACT inhaler 2 puffs, Inhalation, Every 4 Hours PRN   • amLODIPine (NORVASC) 10 mg, Oral, Daily   • Ascorbic Acid (VITAMIN C PO) Vitamin C TABS   • aspirin 81 mg, Oral, Daily   • atorvastatin (LIPITOR) 80 mg, Oral, Nightly   • Blood Glucose Monitoring Suppl (FreeStyle Lite) w/Device kit USE UNIT TO CHECK GLUCOSE 4 TIMES DAILY   • calcium carbonate (TUMS) 500 MG chewable tablet 2 tablets, Oral, 3 Times Daily PRN   • Continuous Blood Gluc  (Dexcom G6 ) device 1 kit, Does not apply, Every 3 Months   • Continuous Blood Gluc Sensor (Dexcom G6 Sensor) Does not apply, Every 10 Days   • Continuous Blood Gluc Transmit (Dexcom G6 Transmitter) misc 1 kit, Does not apply, Every 3 Months   • diphenhydrAMINE (BENADRYL) 25 mg, Oral, Every 6 Hours PRN   • donepezil (ARICEPT) 5 mg, Oral, Nightly   • doxazosin (CARDURA) 1 MG tablet TAKE 1 TABLET BY MOUTH ONCE DAILY AT NIGHT   • estradiol (ESTRACE VAGINAL) 0.1 MG/GM vaginal cream Insert 1 gm intravaginally twice weekly at bedtime.   • ferrous sulfate 325 (65 Fe) MG tablet TAKE 1 TABLET BY MOUTH ONCE DAILY WITH BREAKFAST. TAKE WITH ORANGE JUICE OR VITAMIN C   • FLUoxetine (PROZAC) 20 mg, Oral, Daily   • gabapentin (NEURONTIN) 400 mg, Oral, 2 Times Daily PRN   • Gimoti 15 MG/ACT solution No dose, route, or frequency recorded.   • glucose blood (Glucose Meter Test) test strip Use as instructed to check blood glucose levels 3 times daily   • glucose blood test strip Use as instructed   • glucose monitor monitoring kit 1 each, Does not apply, 4 Times Daily   • Insulin Pen Needle (BD Pen Needle Cydney U/F) 32G X 4 MM misc 1 each, Oral, 4 Times Daily   • melatonin 5 mg, Oral, Nightly PRN   • metoclopramide (REGLAN) 10 mg, Oral, 4 Times Daily Before Meals & Nightly   • metoclopramide (REGLAN) 5 mg, Oral, 3 Times Daily PRN   • mirtazapine (REMERON) 15 MG tablet TAKE 1 TABLET BY MOUTH ONCE DAILY AT NIGHT   • Multiple Vitamins-Minerals  (Multivitamin-Minerals) tablet 1 tablet, Oral, Daily   • Multivitamin tablet tablet 1 tablet, Oral, Daily   • pantoprazole (PROTONIX) 40 MG EC tablet Take 1 tablet by mouth once daily   • sennosides-docusate (senna-docusate sodium) 8.6-50 MG per tablet 2 tablets, Oral, 2 Times Daily PRN   • traMADol (ULTRAM) 50 mg, Oral, Every 6 Hours PRN   • traZODone (DESYREL) 50 MG tablet TAKE 1 TO 2 TABLETS BY MOUTH AT BEDTIME AS NEEDED FOR SLEEP   • vitamin D (ERGOCALCIFEROL) 1.25 MG (01459 UT) capsule capsule Take 1 capsule by mouth once a week          Scheduled Meds:  aspirin, 81 mg, Oral, Daily  atorvastatin, 80 mg, Oral, Nightly  donepezil, 5 mg, Oral, Nightly  droperidol, 2.5 mg, Intravenous, Once  FLUoxetine, 20 mg, Oral, Daily  heparin (porcine), 5,000 Units, Subcutaneous, Q8H  insulin lispro, 0-7 Units, Subcutaneous, TID AC  losartan, 50 mg, Oral, Q24H  megestrol, 400 mg, Oral, Daily  metoclopramide, 10 mg, Intravenous, 4x Daily AC & at Bedtime  metoprolol tartrate, 25 mg, Oral, Q12H  mirtazapine, 15 mg, Oral, Nightly  multivitamin with minerals, 1 tablet, Oral, Daily  pantoprazole, 40 mg, Oral, Daily  senna-docusate sodium, 2 tablet, Oral, BID  sodium chloride, 10 mL, Intravenous, Q12H  terazosin, 1 mg, Oral, Nightly      Continuous Infusions:niCARdipine, 5-15 mg/hr, Last Rate: Stopped (12/07/22 0918)  sodium chloride, 100 mL/hr, Last Rate: 100 mL/hr (12/08/22 1641)            Social History     Socioeconomic History   • Marital status:    Tobacco Use   • Smoking status: Former     Packs/day: 0.25     Years: 30.00     Pack years: 7.50     Types: Cigarettes     Quit date: 5/28/2019     Years since quitting: 3.5   • Smokeless tobacco: Never   • Tobacco comments:     [facility] never smoker    Vaping Use   • Vaping Use: Never used   Substance and Sexual Activity   • Alcohol use: Yes     Alcohol/week: 1.0 standard drink     Types: 1 Glasses of wine per week     Comment: ocassional   • Drug use: No   • Sexual activity:  "Not Currently     Comment: Single        Family History   Problem Relation Age of Onset   • Anxiety disorder Other    • Arthritis Other    • ADD / ADHD Other    • Heart disease Other         cardiac disorder   • Depression Other    • Diabetes Other    • Hyperlipidemia Other    • Hypertension Other    • Lung cancer Other    • Osteoporosis Other    • Hypertension Brother    • Diabetes Brother    • Hyperlipidemia Mother    • Hypertension Mother    • Colon cancer Neg Hx        REVIEW OF SYSTEMS:   Review of Systems   Constitutional: Positive for decreased appetite.   HENT: Negative.    Eyes: Negative.    Cardiovascular: Positive for irregular heartbeat. Negative for chest pain, claudication, leg swelling, near-syncope, orthopnea, palpitations, paroxysmal nocturnal dyspnea and syncope.   Respiratory: Negative.    Endocrine: Negative.    Hematologic/Lymphatic: Negative.    Skin: Negative.    Musculoskeletal: Negative.    Gastrointestinal: Positive for bloating, anorexia, nausea and vomiting.   Genitourinary: Negative.    Neurological: Negative.    Psychiatric/Behavioral: Negative.    Allergic/Immunologic: Negative.    All other systems reviewed and are negative.           Objective:  Vitals:    12/09/22 1300 12/09/22 1353 12/09/22 1400 12/09/22 1518   BP:  161/77  (!) 186/102   BP Location:    Left arm   Patient Position:    Lying   Pulse: 87  94 (!) 141   Resp:    20   Temp:    98.3 °F (36.8 °C)   TempSrc:    Oral   SpO2:       Weight:       Height:         Body mass index is 20.22 kg/m².  Flowsheet Rows    Flowsheet Row First Filed Value   Admission Height 172.7 cm (68\") Documented at 12/06/2022 2302   Admission Weight 60.3 kg (133 lb) Documented at 12/06/2022 2302          Intake/Output Summary (Last 24 hours) at 12/9/2022 1612  Last data filed at 12/9/2022 1300  Gross per 24 hour   Intake 3802 ml   Output --   Net 3802 ml       Vitals and nursing note reviewed.   Constitutional:       Appearance: Healthy appearance. " Not in distress.   Eyes:      Conjunctiva/sclera: Conjunctivae normal.      Pupils: Pupils are equal, round, and reactive to light.   Neck:      Vascular: JVD normal.   Pulmonary:      Effort: Pulmonary effort is normal.      Breath sounds: Normal breath sounds.   Chest:      Chest wall: Not tender to palpatation.   Cardiovascular:      Tachycardia present. Irregular rhythm.      Murmurs: There is no murmur.      No gallop. No click. No rub.   Pulses:     Intact distal pulses.   Abdominal:      General: Bowel sounds are normal.      Palpations: Abdomen is soft.   Musculoskeletal: Normal range of motion.         General: No deformity.      Cervical back: Normal range of motion. Skin:     General: Skin is warm and dry.   Neurological:      General: No focal deficit present.      Mental Status: Alert.                 Lab Review:                CBC:      Lab 12/09/22  1151 12/07/22  1324 12/07/22  0128   WBC 10.03 9.43 12.55*   HEMOGLOBIN 10.7* 11.3* 13.3   HEMATOCRIT 30.0* 32.9* 39.2   PLATELETS 284 330 385   NEUTROS ABS 7.99* 8.34* 11.52*   IMMATURE GRANS (ABS) 0.05 0.04 0.05   LYMPHS ABS 1.39 0.75 0.69*   MONOS ABS 0.59 0.29 0.26   EOS ABS 0.00 0.00 0.00   MCV 89.8 88.9 89.1     CMP:        Lab 12/09/22  1151 12/07/22  1324 12/07/22  0128   SODIUM 145 144 141   POTASSIUM 3.2* 3.0* 3.3*   CHLORIDE 109* 107 98   CO2 22.0 24.0 22.0   ANION GAP 14.0 13.0 21.0*   BUN 15 23 22   CREATININE 0.99 1.17* 1.34*   EGFR 64.2 52.5* 44.6*   GLUCOSE 169* 252* 350*   CALCIUM 9.3 9.6 10.1   MAGNESIUM 1.7  --  1.7   PHOSPHORUS 2.2*  --   --    TOTAL PROTEIN 6.8  --  8.5   ALBUMIN 4.20  --  4.70   GLOBULIN 2.6  --  3.8   ALT (SGPT) 12  --  14   AST (SGOT) 13  --  18   BILIRUBIN 0.5  --  0.6   ALK PHOS 78  --  111   LIPASE  --   --  23     Results from last 7 days   Lab Units 12/09/22  1151   MAGNESIUM mg/dL 1.7     Lab Results   Component Value Date    HGBA1C 6.50 (H) 12/07/2022     Estimated Creatinine Clearance: 55.4 mL/min (by C-G  formula based on SCr of 0.99 mg/dL).  No results found for: DDIMER          CARDIAC LABS:      Lab 12/09/22  1151 12/07/22  0128   TROPONIN T <0.010 <0.010       Lab Results   Component Value Date    CHOL 218 (H) 06/30/2022    TRIG 115 06/30/2022    HDL 51 06/30/2022     (H) 06/30/2022           EKG:         Result Review:  I have personally reviewed the results from the time of admission and agree with these findings:  [x]  Laboratory  [x]  Radiology  [x]  EKG/Telemetry   []  Pathology  [x]  Old records  []  Other:        Assessment and Plan:     1. Atrial fibrillation and flutter   -Echo pending, doubt a significant change from 2019   -Lovenox therapeutic dosing based on elevated [id number] score   -diltiazem drip for rate control   -electrolyte replacement   -CHADS-Vasc 5, change AC to Lovenox   -possible ECV in the next few days if she doesn't convert on Diltiazem    2. HTN   -secondary to N/V (inability to keep medications down)   -agree with current antihypertensives   -consider increasing metoprolol to 50 mg bid           Electronically signed by JAMESON Eller, 12/09/22, 4:13 PM EST.      Patient was seen and examined in a face-to-face encounter.  Admitted with issues related to diabetic gastroparesis.  Does have hypokalemia and hypomagnesemia that are requiring replacement.  This afternoon developed atrial flutter with rapid ventricular response.  She denies pain or lightheadedness but does note normal palpitations and irregular heartbeats.  Her [id number] score is elevated due to history of prior TIA hypertension diabetes.    Agree with addition of p.o. metoprolol and we will start IV diltiazem drip for better rate control.  We will start therapeutic Lovenox.  Anticoagulation was initiated within an hour of onset of her arrhythmia and if her atrial flutter is persistent I believe she could be cardioverted if needed.  We will first assess response to rate control.    We will continue to  follow    I, Robe Perales M.D.,  have reviewed the notes, assessments, and/or procedures performed by HOWIE/PA, I CONCUR with the documentation of Thelma Michael.

## 2022-12-09 NOTE — PROGRESS NOTES
[facility] Medicine Services  PROGRESS NOTE    Patient Name: Thelma Michael  : 1960  MRN: [id number]    Date of Admission: 2022  Primary Care Physician: Monet Howe APRN    Subjective   Subjective     CC:  Follow-up for diabetic gastroparesis    HPI:  I seen and evaluated the patient this morning.  Feeling slightly better today.  Had multiple episodes of dry heaving yesterday but no actual vomiting.  Able to tolerate clear liquid diet.  Willing to try soft diet.  Does not have much appetite    ROS:  General : no fevers, chills  CVS: No chest pain, palpitations  Respiratory: No cough, dyspnea  GI: Significant nausea.  No vomiting or abdominal pain  10 point review of systems is negative except for what is mentioned in HPI    Objective   Objective     Vital Signs:   Temp:  [98.4 °F (36.9 °C)-99.4 °F (37.4 °C)] 99 °F (37.2 °C)  Heart Rate:  [] 99  Resp:  [16-19] 19  BP: (110-196)/(49-86) 184/83     Physical Exam:  General: Chronically ill looking, uncomfortable from nausea, not in distress, conversant and cooperative  Head: Atraumatic and normocephalic  Eyes: No Icterus. No pallor  Ears:  Ears appear intact with no abnormalities noted  Throat: No oral lesions, no thrush  Neck: Supple, trachea midline  Lungs: Clear to auscultation bilaterally, equal air entry, no wheezing or crackles  Heart:  Normal S1 and S2, no murmur, no gallop, No JVD, no lower extremity swelling  Abdomen:  Soft, no tenderness, no organomegaly, normal bowel sounds, no organomegaly  Extremities: pulses equal bilaterally  Skin: No bleeding, bruising or rash, normal skin turgor and elasticity  Neurologic: Cranial nerves appear intact with no evidence of facial asymmetry, normal motor and sensory functions in all 4 extremities  Psych: Alert and oriented x 3, normal mood    Results Reviewed:  LAB RESULTS:      Lab 22  1640 22  1324 22  0823 22  0507 22  0128    WBC  --  9.43  --   --  12.55*   HEMOGLOBIN  --  11.3*  --   --  13.3   HEMATOCRIT  --  32.9*  --   --  39.2   PLATELETS  --  330  --   --  385   NEUTROS ABS  --  8.34*  --   --  11.52*   IMMATURE GRANS (ABS)  --  0.04  --   --  0.05   LYMPHS ABS  --  0.75  --   --  0.69*   MONOS ABS  --  0.29  --   --  0.26   EOS ABS  --  0.00  --   --  0.00   MCV  --  88.9  --   --  89.1   PROCALCITONIN  --   --   --   --  0.07   LACTATE 2.0 2.3* 5.6* 3.9*  --          Lab 12/07/22  1324 12/07/22  0128   SODIUM 144 141   POTASSIUM 3.0* 3.3*   CHLORIDE 107 98   CO2 24.0 22.0   ANION GAP 13.0 21.0*   BUN 23 22   CREATININE 1.17* 1.34*   EGFR 52.5* 44.6*   GLUCOSE 252* 350*   CALCIUM 9.6 10.1   MAGNESIUM  --  1.7   HEMOGLOBIN A1C 6.50*  --          Lab 12/07/22  0128   TOTAL PROTEIN 8.5   ALBUMIN 4.70   GLOBULIN 3.8   ALT (SGPT) 14   AST (SGOT) 18   BILIRUBIN 0.6   ALK PHOS 111   LIPASE 23         Lab 12/07/22  0128   TROPONIN T <0.010                 Lab 12/07/22  0357   FIO2 21   CARBOXYHEMOGLOBIN (VENOUS) 1.0     Brief Urine Lab Results  (Last result in the past 365 days)      Color   Clarity   Blood   Leuk Est   Nitrite   Protein   CREAT   Urine HCG        12/07/22 0400 Yellow   Clear   Negative   Negative   Negative   >=300 mg/dL (3+)                 Microbiology Results Abnormal     Procedure Component Value - Date/Time    COVID PRE-OP / PRE-PROCEDURE SCREENING ORDER (NO ISOLATION) - Swab, Nasopharynx [[id number]]  (Normal) Collected: 12/06/22 6793    Lab Status: Final result Specimen: Swab from Nasopharynx Updated: 12/07/22 0022    Narrative:      The following orders were created for panel order COVID PRE-OP / PRE-PROCEDURE SCREENING ORDER (NO ISOLATION) - Swab, Nasopharynx.  Procedure                               Abnormality         Status                     ---------                               -----------         ------                     COVID-19 and FLU A/B PCR...[[id number]]  Normal              Final result                  Please view results for these tests on the individual orders.    COVID-19 and FLU A/B PCR - Swab, Nasopharynx [[id number]]  (Normal) Collected: 12/06/22 2333    Lab Status: Final result Specimen: Swab from Nasopharynx Updated: 12/07/22 0022     COVID19 Not Detected     Influenza A PCR Not Detected     Influenza B PCR Not Detected    Narrative:      Fact sheet for providers: https://www.fda.gov/media/666301/download    Fact sheet for patients: https://www.fda.gov/media/161534/download    Test performed by PCR.          No radiology results from the last 24 hrs    Results for orders placed during the hospital encounter of 11/19/19    Adult Transesophageal Echo (LIZANDRO) W/ Cont if Necessary Per Protocol    Interpretation Summary  · Left ventricular systolic function is normal. Estimated EF = 65%.  · Left ventricular wall thickness is consistent with hypertrophy.  · Small patent foramen ovale present. Saline test results are positive with valsalva manuever.  · There is mild mitral valve prolapse of the anterior mitral leaflet.  · Mild tricuspid valve regurgitation is present.  · Estimated right ventricular systolic pressure from tricuspid regurgitation is mildly elevated (35-45 mmHg).  · There is mild plaque in the descending aorta present.      I have reviewed the medications:  Scheduled Meds:aspirin, 81 mg, Oral, Daily  atorvastatin, 80 mg, Oral, Nightly  donepezil, 5 mg, Oral, Nightly  droperidol, 2.5 mg, Intravenous, Once  FLUoxetine, 20 mg, Oral, Daily  heparin (porcine), 5,000 Units, Subcutaneous, Q8H  insulin lispro, 0-7 Units, Subcutaneous, TID AC  metoclopramide, 10 mg, Intravenous, 4x Daily AC & at Bedtime  metoprolol tartrate, 2.5 mg, Intravenous, Once  mirtazapine, 15 mg, Oral, Nightly  multivitamin with minerals, 1 tablet, Oral, Daily  pantoprazole, 40 mg, Oral, Daily  senna-docusate sodium, 2 tablet, Oral, BID  sodium chloride, 10 mL, Intravenous, Q12H  terazosin, 1 mg, Oral, Nightly      Continuous  Infusions:niCARdipine, 5-15 mg/hr, Last Rate: Stopped (12/07/22 0918)  sodium chloride, 100 mL/hr, Last Rate: 100 mL/hr (12/08/22 1641)      PRN Meds:.•  acetaminophen **OR** acetaminophen **OR** acetaminophen  •  senna-docusate sodium **AND** polyethylene glycol **AND** bisacodyl **AND** bisacodyl  •  dextrose  •  dextrose  •  glucagon (human recombinant)  •  influenza vaccine  •  melatonin  •  ondansetron  •  potassium chloride **OR** potassium chloride **OR** potassium chloride  •  promethazine  •  sodium chloride  •  sodium chloride  •  traZODone    Assessment & Plan   Assessment & Plan     Active Hospital Problems    Diagnosis  POA   • **Nausea and vomiting, unspecified vomiting type [R11.2]  Yes   • Elevated serum creatinine [R79.89]  Yes   • Leukocytosis [D72.829]  Yes   • Gastroparesis [K31.84]  Yes   • Uncontrolled type 1 diabetes mellitus with hyperglycemia (HCC) [E10.65]  Yes   • Nausea & vomiting [R11.2]  Yes   • Hypertensive urgency [I16.0]  Yes   • Personal history of cardiac murmur [Z86.79]  Not Applicable   • Hyperlipidemia [E78.5]  Yes   • Hypertension [I10]  Yes      Resolved Hospital Problems   No resolved problems to display.        Brief Hospital Course to date:  Thelma Michael is a 61 y.o. female with past medical history of type 1 diabetes on insulin pump, severe diabetic gastroparesis status post gastric stimulator implantation, following with gastroparesis clinic in [place], essential hypertension, dyslipidemia, Tobacco use disorder who presented to the hospital with intractable nausea and vomiting and worsening fatigue    Assessment and plan:, Improving  Intractable nausea and vomiting  Diabetic gastroparesis flare  · Her intractable nausea and vomiting on admission is most likely related to her diabetic gastroparesis.  · Did not respond to Zofran but responding well to Phenergan with minimal nausea now  · Responded well to IV Reglan, continue  · Tolerating clear liquid diet, will  advance to GI soft diet  · Does not have much appetite, will add Megace  · Continue IV fluids  · Check and replenish electrolytes per protocol  · Will monitor clinical response, if she continues to improve, can be discharged home tomorrow, otherwise we will consider GI consultation     Leukocytosis  · Likely reactive, improved and returned to baseline, continue to monitor CBC     Hypertensive Urgency   · Blood pressure is not well controlled.    · Continue amlodipine 10 mg.  We will add losartan 50 mg daily and as needed clonidine     Mild RAFAT  · Likely secondary to dehydration  · Continue IV fluids and monitor kidney functions closely     Hypokalemia  Hypomagnesemia  · Replace per protocol     DM type 1  · A1c 6.5%  · Insulin sliding scale for now given her poor oral intake      Expected Discharge Location and Transportation: Home  Expected Discharge Date: 12/10/2020    DVT prophylaxis:  Medical DVT prophylaxis orders are present.     AM-PAC 6 Clicks Score (PT): 24 (12/08/22 0800)    CODE STATUS:   Code Status and Medical Interventions:   Ordered at: 12/07/22 0501     Code Status (Patient has no pulse and is not breathing):    CPR (Attempt to Resuscitate)     Medical Interventions (Patient has pulse or is breathing):    Full Support       Shirin Swenson MD  12/09/22      Addendum 12/9/2022 at 3 PM    Patient developed atrial flutter with 2-1 block.  Heart rate 140  Denies any chest pain or shortness of breath  EKG confirmed atrial flutter  Never had any history of flutter fibrillation in the past  We will give her 1 dose of IV Cardizem 20 mg and monitor clinical response  Start p.o. metoprolol 25 twice daily  Obtain echocardiogram and troponin  Cardiology consultation

## 2022-12-09 NOTE — PLAN OF CARE
Goal Outcome Evaluation:   Patient flipped into Aflutter with a 2:1 conduction at 1500. Diltiazem bolus and drip initiated as ordered. Magnesium and potassium replaced per protocol. Patient flipped back into NSR at 1830. Room air. Will continue to monitor.

## 2022-12-09 NOTE — PLAN OF CARE
Goal Outcome Evaluation:           Progress: improving  Outcome Evaluation: Pt has rested well in bed throughout shift. Scheduled N/V medivation given with relief. NS infusing at 100mL/hr. q4 BS check. Pt voices no needs at this time, call light within reach.

## 2022-12-09 NOTE — CASE MANAGEMENT/SOCIAL WORK
Continued Stay Note  [facility]     Patient Name: Thelma Michael  MRN: [id number]  Today's Date: 12/9/2022    Admit Date: 12/6/2022    Plan: HOME   Discharge Plan     Row Name 12/09/22 1225       Plan    Plan HOME    Patient/Family in Agreement with Plan yes    Plan Comments Met with pt at bedside to f/u DCP.  Plan remains to return home upon DC.  No immediate needs identified/voiced.  CM will cont to follow.    Final Discharge Disposition Code 01 - home or self-care               Discharge Codes    No documentation.               Expected Discharge Date and Time     Expected Discharge Date Expected Discharge Time    Dec 10, 2022             Margaret Gallegos RN

## 2022-12-10 ENCOUNTER — APPOINTMENT (OUTPATIENT)
Dept: CARDIOLOGY | Facility: HOSPITAL | Age: 62
End: 2022-12-10

## 2022-12-10 LAB
ADV 40+41 DNA STL QL NAA+NON-PROBE: NOT DETECTED
ANION GAP SERPL CALCULATED.3IONS-SCNC: 12 MMOL/L (ref 5–15)
ASTRO TYP 1-8 RNA STL QL NAA+NON-PROBE: NOT DETECTED
BASOPHILS # BLD AUTO: 0 10*3/MM3 (ref 0–0.2)
BASOPHILS NFR BLD AUTO: 0 % (ref 0–1.5)
BH CV ECHO MEAS - AO MAX PG: 11 MMHG
BH CV ECHO MEAS - AO MEAN PG: 6 MMHG
BH CV ECHO MEAS - AO ROOT DIAM: 1.9 CM
BH CV ECHO MEAS - AO V2 MAX: 166 CM/SEC
BH CV ECHO MEAS - AO V2 VTI: 35.9 CM
BH CV ECHO MEAS - AVA(I,D): 2.6 CM2
BH CV ECHO MEAS - EDV(CUBED): 46.7 ML
BH CV ECHO MEAS - EDV(MOD-SP2): 65.9 ML
BH CV ECHO MEAS - EDV(MOD-SP4): 52.4 ML
BH CV ECHO MEAS - EF(MOD-BP): 81.1 %
BH CV ECHO MEAS - EF(MOD-SP2): 86.4 %
BH CV ECHO MEAS - EF(MOD-SP4): 75.2 %
BH CV ECHO MEAS - ESV(CUBED): 5.8 ML
BH CV ECHO MEAS - ESV(MOD-SP2): 8.9 ML
BH CV ECHO MEAS - ESV(MOD-SP4): 13 ML
BH CV ECHO MEAS - FS: 50 %
BH CV ECHO MEAS - IVS/LVPW: 1 CM
BH CV ECHO MEAS - IVSD: 1.3 CM
BH CV ECHO MEAS - LA DIMENSION: 3.5 CM
BH CV ECHO MEAS - LAT PEAK E' VEL: 8.1 CM/SEC
BH CV ECHO MEAS - LV MASS(C)D: 160.1 GRAMS
BH CV ECHO MEAS - LV MAX PG: 8 MMHG
BH CV ECHO MEAS - LV MEAN PG: 4 MMHG
BH CV ECHO MEAS - LV V1 MAX: 141 CM/SEC
BH CV ECHO MEAS - LV V1 VTI: 29.6 CM
BH CV ECHO MEAS - LVIDD: 3.6 CM
BH CV ECHO MEAS - LVIDS: 1.8 CM
BH CV ECHO MEAS - LVOT AREA: 3.1 CM2
BH CV ECHO MEAS - LVOT DIAM: 2 CM
BH CV ECHO MEAS - LVPWD: 1.3 CM
BH CV ECHO MEAS - MED PEAK E' VEL: 6.7 CM/SEC
BH CV ECHO MEAS - MV A MAX VEL: 117 CM/SEC
BH CV ECHO MEAS - MV DEC SLOPE: 629 CM/SEC2
BH CV ECHO MEAS - MV DEC TIME: 0.27 MSEC
BH CV ECHO MEAS - MV E MAX VEL: 102 CM/SEC
BH CV ECHO MEAS - MV E/A: 0.87
BH CV ECHO MEAS - MV MAX PG: 7 MMHG
BH CV ECHO MEAS - MV MEAN PG: 3 MMHG
BH CV ECHO MEAS - MV P1/2T: 65.2 MSEC
BH CV ECHO MEAS - MV V2 VTI: 39.8 CM
BH CV ECHO MEAS - MVA(P1/2T): 3.4 CM2
BH CV ECHO MEAS - MVA(VTI): 2.34 CM2
BH CV ECHO MEAS - PA ACC TIME: 0.14 SEC
BH CV ECHO MEAS - PA PR(ACCEL): 14.2 MMHG
BH CV ECHO MEAS - RAP SYSTOLE: 3 MMHG
BH CV ECHO MEAS - RVSP: 39 MMHG
BH CV ECHO MEAS - SV(LVOT): 93 ML
BH CV ECHO MEAS - SV(MOD-SP2): 57 ML
BH CV ECHO MEAS - SV(MOD-SP4): 39.4 ML
BH CV ECHO MEAS - TAPSE (>1.6): 2.37 CM
BH CV ECHO MEAS - TR MAX PG: 37 MMHG
BH CV ECHO MEAS - TR MAX VEL: 302 CM/SEC
BH CV ECHO MEASUREMENTS AVERAGE E/E' RATIO: 13.78
BH CV XLRA - RV BASE: 3.2 CM
BH CV XLRA - RV LENGTH: 6.3 CM
BH CV XLRA - RV MID: 2.8 CM
BH CV XLRA - TDI S': 16.9 CM/SEC
BUN SERPL-MCNC: 20 MG/DL (ref 8–23)
BUN/CREAT SERPL: 15 (ref 7–25)
C CAYETANENSIS DNA STL QL NAA+NON-PROBE: NOT DETECTED
C COLI+JEJ+UPSA DNA STL QL NAA+NON-PROBE: NOT DETECTED
CALCIUM SPEC-SCNC: 8.5 MG/DL (ref 8.6–10.5)
CHLORIDE SERPL-SCNC: 112 MMOL/L (ref 98–107)
CO2 SERPL-SCNC: 22 MMOL/L (ref 22–29)
CREAT SERPL-MCNC: 1.33 MG/DL (ref 0.57–1)
CRYPTOSP DNA STL QL NAA+NON-PROBE: NOT DETECTED
DEPRECATED RDW RBC AUTO: 43.7 FL (ref 37–54)
E HISTOLYT DNA STL QL NAA+NON-PROBE: NOT DETECTED
EAEC PAA PLAS AGGR+AATA ST NAA+NON-PRB: DETECTED
EC STX1+STX2 GENES STL QL NAA+NON-PROBE: NOT DETECTED
EGFRCR SERPLBLD CKD-EPI 2021: 45 ML/MIN/1.73
EOSINOPHIL # BLD AUTO: 0 10*3/MM3 (ref 0–0.4)
EOSINOPHIL NFR BLD AUTO: 0 % (ref 0.3–6.2)
EPEC EAE GENE STL QL NAA+NON-PROBE: NOT DETECTED
ERYTHROCYTE [DISTWIDTH] IN BLOOD BY AUTOMATED COUNT: 13.3 % (ref 12.3–15.4)
ETEC LTA+ST1A+ST1B TOX ST NAA+NON-PROBE: NOT DETECTED
G LAMBLIA DNA STL QL NAA+NON-PROBE: NOT DETECTED
GLUCOSE BLDC GLUCOMTR-MCNC: 143 MG/DL (ref 70–130)
GLUCOSE BLDC GLUCOMTR-MCNC: 155 MG/DL (ref 70–130)
GLUCOSE BLDC GLUCOMTR-MCNC: 192 MG/DL (ref 70–130)
GLUCOSE BLDC GLUCOMTR-MCNC: 193 MG/DL (ref 70–130)
GLUCOSE BLDC GLUCOMTR-MCNC: 194 MG/DL (ref 70–130)
GLUCOSE BLDC GLUCOMTR-MCNC: 241 MG/DL (ref 70–130)
GLUCOSE BLDC GLUCOMTR-MCNC: 248 MG/DL (ref 70–130)
GLUCOSE SERPL-MCNC: 189 MG/DL (ref 65–99)
HCT VFR BLD AUTO: 26.3 % (ref 34–46.6)
HGB BLD-MCNC: 9.1 G/DL (ref 12–15.9)
IMM GRANULOCYTES # BLD AUTO: 0.03 10*3/MM3 (ref 0–0.05)
IMM GRANULOCYTES NFR BLD AUTO: 0.4 % (ref 0–0.5)
LEFT ATRIUM VOLUME INDEX: 23.4 ML/M2
LYMPHOCYTES # BLD AUTO: 1.23 10*3/MM3 (ref 0.7–3.1)
LYMPHOCYTES NFR BLD AUTO: 15.9 % (ref 19.6–45.3)
MAGNESIUM SERPL-MCNC: 3 MG/DL (ref 1.6–2.4)
MAXIMAL PREDICTED HEART RATE: 157 BPM
MCH RBC QN AUTO: 31.2 PG (ref 26.6–33)
MCHC RBC AUTO-ENTMCNC: 34.6 G/DL (ref 31.5–35.7)
MCV RBC AUTO: 90.1 FL (ref 79–97)
MONOCYTES # BLD AUTO: 0.42 10*3/MM3 (ref 0.1–0.9)
MONOCYTES NFR BLD AUTO: 5.4 % (ref 5–12)
NEUTROPHILS NFR BLD AUTO: 6.08 10*3/MM3 (ref 1.7–7)
NEUTROPHILS NFR BLD AUTO: 78.3 % (ref 42.7–76)
NOROVIRUS GI+II RNA STL QL NAA+NON-PROBE: NOT DETECTED
NRBC BLD AUTO-RTO: 0 /100 WBC (ref 0–0.2)
P SHIGELLOIDES DNA STL QL NAA+NON-PROBE: NOT DETECTED
PLATELET # BLD AUTO: 258 10*3/MM3 (ref 140–450)
PMV BLD AUTO: 10.7 FL (ref 6–12)
POTASSIUM SERPL-SCNC: 3.7 MMOL/L (ref 3.5–5.2)
RBC # BLD AUTO: 2.92 10*6/MM3 (ref 3.77–5.28)
RVA RNA STL QL NAA+NON-PROBE: NOT DETECTED
S ENT+BONG DNA STL QL NAA+NON-PROBE: NOT DETECTED
SAPO I+II+IV+V RNA STL QL NAA+NON-PROBE: NOT DETECTED
SHIGELLA SP+EIEC IPAH ST NAA+NON-PROBE: NOT DETECTED
SODIUM SERPL-SCNC: 146 MMOL/L (ref 136–145)
STRESS TARGET HR: 133 BPM
V CHOL+PARA+VUL DNA STL QL NAA+NON-PROBE: NOT DETECTED
V CHOLERAE DNA STL QL NAA+NON-PROBE: NOT DETECTED
WBC NRBC COR # BLD: 7.76 10*3/MM3 (ref 3.4–10.8)
Y ENTEROCOL DNA STL QL NAA+NON-PROBE: NOT DETECTED

## 2022-12-10 PROCEDURE — 93005 ELECTROCARDIOGRAM TRACING: CPT | Performed by: NURSE PRACTITIONER

## 2022-12-10 PROCEDURE — 99233 SBSQ HOSP IP/OBS HIGH 50: CPT | Performed by: NURSE PRACTITIONER

## 2022-12-10 PROCEDURE — 87507 IADNA-DNA/RNA PROBE TQ 12-25: CPT | Performed by: INTERNAL MEDICINE

## 2022-12-10 PROCEDURE — 82962 GLUCOSE BLOOD TEST: CPT

## 2022-12-10 PROCEDURE — 25010000002 AMIODARONE IN DEXTROSE 5% 360-4.14 MG/200ML-% SOLUTION: Performed by: INTERNAL MEDICINE

## 2022-12-10 PROCEDURE — 25010000002 ENOXAPARIN PER 10 MG: Performed by: PHYSICIAN ASSISTANT

## 2022-12-10 PROCEDURE — 63710000001 INSULIN LISPRO (HUMAN) PER 5 UNITS: Performed by: NURSE PRACTITIONER

## 2022-12-10 PROCEDURE — 80048 BASIC METABOLIC PNL TOTAL CA: CPT | Performed by: INTERNAL MEDICINE

## 2022-12-10 PROCEDURE — 99232 SBSQ HOSP IP/OBS MODERATE 35: CPT | Performed by: INTERNAL MEDICINE

## 2022-12-10 PROCEDURE — 93306 TTE W/DOPPLER COMPLETE: CPT

## 2022-12-10 PROCEDURE — 93306 TTE W/DOPPLER COMPLETE: CPT | Performed by: INTERNAL MEDICINE

## 2022-12-10 PROCEDURE — 83735 ASSAY OF MAGNESIUM: CPT | Performed by: INTERNAL MEDICINE

## 2022-12-10 PROCEDURE — 93010 ELECTROCARDIOGRAM REPORT: CPT | Performed by: INTERNAL MEDICINE

## 2022-12-10 PROCEDURE — 25010000002 METOCLOPRAMIDE PER 10 MG: Performed by: INTERNAL MEDICINE

## 2022-12-10 PROCEDURE — 85025 COMPLETE CBC W/AUTO DIFF WBC: CPT | Performed by: INTERNAL MEDICINE

## 2022-12-10 RX ORDER — CIPROFLOXACIN 250 MG/1
500 TABLET, FILM COATED ORAL EVERY 12 HOURS SCHEDULED
Status: DISCONTINUED | OUTPATIENT
Start: 2022-12-10 | End: 2022-12-10

## 2022-12-10 RX ORDER — SODIUM CHLORIDE 450 MG/100ML
100 INJECTION, SOLUTION INTRAVENOUS CONTINUOUS
Status: DISCONTINUED | OUTPATIENT
Start: 2022-12-10 | End: 2022-12-11

## 2022-12-10 RX ORDER — METOCLOPRAMIDE 10 MG/1
10 TABLET ORAL
Status: DISCONTINUED | OUTPATIENT
Start: 2022-12-10 | End: 2022-12-12 | Stop reason: HOSPADM

## 2022-12-10 RX ADMIN — METOCLOPRAMIDE 10 MG: 10 TABLET ORAL at 17:39

## 2022-12-10 RX ADMIN — SODIUM CHLORIDE 100 ML/HR: 4.5 INJECTION, SOLUTION INTRAVENOUS at 12:00

## 2022-12-10 RX ADMIN — METOCLOPRAMIDE 10 MG: 10 TABLET ORAL at 12:00

## 2022-12-10 RX ADMIN — MIRTAZAPINE 15 MG: 15 TABLET, FILM COATED ORAL at 20:16

## 2022-12-10 RX ADMIN — SODIUM CHLORIDE 100 ML/HR: 4.5 INJECTION, SOLUTION INTRAVENOUS at 20:19

## 2022-12-10 RX ADMIN — DONEPEZIL HYDROCHLORIDE 5 MG: 10 TABLET, FILM COATED ORAL at 20:16

## 2022-12-10 RX ADMIN — MEGESTROL ACETATE 400 MG: 400 SUSPENSION ORAL at 08:15

## 2022-12-10 RX ADMIN — TRAZODONE HYDROCHLORIDE 50 MG: 50 TABLET ORAL at 23:58

## 2022-12-10 RX ADMIN — ENOXAPARIN SODIUM 60 MG: 60 INJECTION SUBCUTANEOUS at 05:12

## 2022-12-10 RX ADMIN — INSULIN LISPRO 2 UNITS: 100 INJECTION, SOLUTION INTRAVENOUS; SUBCUTANEOUS at 08:13

## 2022-12-10 RX ADMIN — Medication 10 ML: at 08:14

## 2022-12-10 RX ADMIN — Medication 5 MG: at 23:58

## 2022-12-10 RX ADMIN — INSULIN LISPRO 3 UNITS: 100 INJECTION, SOLUTION INTRAVENOUS; SUBCUTANEOUS at 11:59

## 2022-12-10 RX ADMIN — AMIODARONE HYDROCHLORIDE 1 MG/MIN: 1.8 INJECTION, SOLUTION INTRAVENOUS at 15:59

## 2022-12-10 RX ADMIN — METOPROLOL TARTRATE 25 MG: 25 TABLET, FILM COATED ORAL at 20:16

## 2022-12-10 RX ADMIN — Medication 10 ML: at 20:16

## 2022-12-10 RX ADMIN — ATORVASTATIN CALCIUM 80 MG: 40 TABLET, FILM COATED ORAL at 20:16

## 2022-12-10 RX ADMIN — CLONIDINE HYDROCHLORIDE 0.1 MG: 0.1 TABLET ORAL at 17:42

## 2022-12-10 RX ADMIN — METOPROLOL TARTRATE 25 MG: 25 TABLET, FILM COATED ORAL at 08:13

## 2022-12-10 RX ADMIN — LOSARTAN POTASSIUM 50 MG: 50 TABLET, FILM COATED ORAL at 08:13

## 2022-12-10 RX ADMIN — PANTOPRAZOLE SODIUM 40 MG: 40 TABLET, DELAYED RELEASE ORAL at 08:13

## 2022-12-10 RX ADMIN — AMIODARONE HYDROCHLORIDE 1 MG/MIN: 1.8 INJECTION, SOLUTION INTRAVENOUS at 21:59

## 2022-12-10 RX ADMIN — Medication 1 TABLET: at 08:13

## 2022-12-10 RX ADMIN — AMIODARONE HYDROCHLORIDE 1 MG/MIN: 1.8 INJECTION, SOLUTION INTRAVENOUS at 07:01

## 2022-12-10 RX ADMIN — FLUOXETINE 20 MG: 20 CAPSULE ORAL at 08:13

## 2022-12-10 RX ADMIN — ASPIRIN 81 MG: 81 TABLET, COATED ORAL at 08:13

## 2022-12-10 RX ADMIN — METOCLOPRAMIDE 10 MG: 5 INJECTION, SOLUTION INTRAMUSCULAR; INTRAVENOUS at 08:13

## 2022-12-10 RX ADMIN — METOCLOPRAMIDE 10 MG: 10 TABLET ORAL at 20:17

## 2022-12-10 RX ADMIN — ENOXAPARIN SODIUM 60 MG: 60 INJECTION SUBCUTANEOUS at 17:39

## 2022-12-10 RX ADMIN — TERAZOSIN HYDROCHLORIDE 1 MG: 1 CAPSULE ORAL at 20:16

## 2022-12-10 NOTE — PROGRESS NOTES
[facility]  IP Progress Note      Chief Complaint/Reason for service: #1 a flutter RVR #2 hypertension    Subjective   Subjective: The patient states she is better but still nauseous.  She states she did feel her heart racing when it happened.  She is converted to sinus rhythm    Past medical, surgical, social and family history reviewed in the patient's electronic medical record.    Objective     Vital Sign Min/Max for last 24 hours  Temp  Min: 98.3 °F (36.8 °C)  Max: 99.2 °F (37.3 °C)   BP  Min: 109/97  Max: 204/96   Pulse  Min: 59  Max: 147   Resp  Min: 16  Max: 20   SpO2  Min: 92 %  Max: 100 %   No data recorded      Intake/Output Summary (Last 24 hours) at 12/10/2022 0631  Last data filed at 12/9/2022 1700  Gross per 24 hour   Intake 370 ml   Output --   Net 370 ml             Current Facility-Administered Medications:   •  acetaminophen (TYLENOL) tablet 650 mg, 650 mg, Oral, Q4H PRN **OR** acetaminophen (TYLENOL) 160 MG/5ML solution 650 mg, 650 mg, Oral, Q4H PRN **OR** acetaminophen (TYLENOL) suppository 650 mg, 650 mg, Rectal, Q4H PRN, Eli Zavala, APRN  •  aspirin EC tablet 81 mg, 81 mg, Oral, Daily, Eli Zavala APRN, 81 mg at 12/09/22 0839  •  atorvastatin (LIPITOR) tablet 80 mg, 80 mg, Oral, Nightly, Eli Zavala APRN, 80 mg at 12/09/22 2009  •  sennosides-docusate (PERICOLACE) 8.6-50 MG per tablet 2 tablet, 2 tablet, Oral, BID, 2 tablet at 12/07/22 2150 **AND** polyethylene glycol (MIRALAX) packet 17 g, 17 g, Oral, Daily PRN **AND** bisacodyl (DULCOLAX) EC tablet 5 mg, 5 mg, Oral, Daily PRN **AND** bisacodyl (DULCOLAX) suppository 10 mg, 10 mg, Rectal, Daily PRN, Eli Zavala, APRN  •  cloNIDine (CATAPRES) tablet 0.1 mg, 0.1 mg, Oral, Q8H PRN, Shirin Swenson MD  •  dextrose (D50W) (25 g/50 mL) IV injection 25 g, 25 g, Intravenous, Q15 Min PRN, Eli Zavala APRN  •  dextrose (GLUTOSE) oral gel 15 g, 15 g, Oral, Q15 Min PRN, Lucas,  HOWIE Mcintosh  •  dilTIAZem (CARDIZEM) 125 mg in sodium chloride 0.9 % 125 mL infusion, 5-15 mg/hr, Intravenous, Titrated, Zana Torres PA, Last Rate: 5 mL/hr at 12/10/22 0511, 5 mg/hr at 12/10/22 0511  •  donepezil (ARICEPT) tablet 5 mg, 5 mg, Oral, Nightly, Eli Zavala APRN, 5 mg at 12/09/22 2009  •  droperidol (INAPSINE) injection 2.5 mg, 2.5 mg, Intravenous, Once, Avatr Waite MD  •  Enoxaparin Sodium (LOVENOX) syringe 60 mg, 1 mg/kg, Subcutaneous, Q12H, Zana Torres PA, 60 mg at 12/10/22 0512  •  FLUoxetine (PROzac) capsule 20 mg, 20 mg, Oral, Daily, Eli Zavala APRN, 20 mg at 12/09/22 0839  •  glucagon (human recombinant) (GLUCAGEN DIAGNOSTIC) injection 1 mg, 1 mg, Intramuscular, Q15 Min PRN, Eli Zavala APRN  •  influenza vac split quad (FLUZONE,FLUARIX,AFLURIA,FLULAVAL) injection 0.5 mL, 0.5 mL, Intramuscular, During Hospitalization, Kavon Cramer,   •  Insulin Lispro (humaLOG) injection 0-7 Units, 0-7 Units, Subcutaneous, TID AC, Eli Zavala APRN, 2 Units at 12/09/22 1646  •  losartan (COZAAR) tablet 50 mg, 50 mg, Oral, Q24H, Shirin Swenson MD, 50 mg at 12/09/22 1518  •  megestrol (MEGACE) 40 MG/ML suspension 400 mg, 400 mg, Oral, Daily, Shirin Swenson MD, 400 mg at 12/09/22 1537  •  melatonin tablet 5 mg, 5 mg, Oral, Nightly PRN, Eli Zavala APRN, 5 mg at 12/09/22 2009  •  metoclopramide (REGLAN) injection 10 mg, 10 mg, Intravenous, 4x Daily AC & at Bedtime, Shirin Swenson MD, 10 mg at 12/09/22 2009  •  metoprolol tartrate (LOPRESSOR) tablet 25 mg, 25 mg, Oral, Q12H, Shirin Swenson MD, 25 mg at 12/09/22 2009  •  mirtazapine (REMERON) tablet 15 mg, 15 mg, Oral, Nightly, Eli Zavala APRN, 15 mg at 12/09/22 2009  •  multivitamin with minerals 1 tablet, 1 tablet, Oral, Daily, Eli Zavala APRN, 1 tablet at 12/09/22 0840  •  niCARdipine (CARDENE) 25mg in 250mL NS infusion, 5-15 mg/hr, Intravenous, Titrated,  Eli Zavala APRN, Stopped at 12/07/22 0918  •  pantoprazole (PROTONIX) EC tablet 40 mg, 40 mg, Oral, Daily, Eli Zavala APRN, 40 mg at 12/09/22 0840  •  potassium chloride (MICRO-K) CR capsule 40 mEq, 40 mEq, Oral, PRN, 40 mEq at 12/09/22 1724 **OR** potassium chloride (KLOR-CON) packet 40 mEq, 40 mEq, Oral, PRN **OR** potassium chloride 10 mEq in 100 mL IVPB, 10 mEq, Intravenous, Q1H PRN, Eli Zavala APRN  •  promethazine (PHENERGAN) 12.5 mg in sodium chloride 0.9 % 50 mL, 12.5 mg, Intravenous, Q4H PRN, Shirin Swenson MD, 12.5 mg at 12/08/22 1323  •  sodium chloride 0.9 % flush 10 mL, 10 mL, Intravenous, Q12H, Eli Zavala APRN, 10 mL at 12/09/22 2011  •  sodium chloride 0.9 % flush 10 mL, 10 mL, Intravenous, PRN, Eli Zavala APRN  •  sodium chloride 0.9 % infusion 40 mL, 40 mL, Intravenous, PRN, Eli Zavala APRN  •  sodium chloride 0.9 % infusion, 100 mL/hr, Intravenous, Continuous, Eli Zavala APRN, Last Rate: 100 mL/hr at 12/08/22 1641, 100 mL/hr at 12/08/22 1641  •  terazosin (HYTRIN) capsule 1 mg, 1 mg, Oral, Nightly, Eli Zavala APRN, 1 mg at 12/09/22 2009  •  traZODone (DESYREL) tablet 50 mg, 50 mg, Oral, Nightly PRN, Kavon Cramer DO, 50 mg at 12/09/22 2009    Physical Exam: General Pleasant female lying on her side 30 degree angle not dyspneic nontachypneic current heart rate 68        HEENT: No JVP no icterus       Respiratory: Equal bilateral symmetrical expansion with reduced breath sounds and clear to auscultation       Cardiovascular: Regular rate and rhythm without murmur gallop or click and no edema to palpation       GI: Soft and flat       Lower Extremities: No lesions       Neuro: Facial expressions are symmetrical moves all 4 extremities       Skin: Warm and dry no edema to palpation       Psych: Pleasant affect    Results Review: Patient is a net 5.3 L ahead since admission.  Heart rate is now down to in the 60s bpm.  Blood  pressures 1 13-1 50.  Lab work is pending.    Radiology Results:  Imaging Results (Last 72 Hours)     ** No results found for the last 72 hours. **          EKG: A flutter RVR 2 1 conduction lateral ST abnormality    ECHO: Pending    Tele: Sinus rhythm    Assessment   Assessment/Plan: #1 paroxysmal atrial flutter--patient converted to sinus rhythm with on diltiazem.  Since she is unable to take p.o. meds I will go ahead and start her on amiodarone drip 1 mg/min without a bolus.  The decision to be made at the time of discharge whether not to continue this and she will need to be anticoagulated with a NOAC  2 hypertension-improved    Samm Sanders MD  12/10/22  06:31 EST

## 2022-12-10 NOTE — PLAN OF CARE
Goal Outcome Evaluation:  Plan of Care Reviewed With: patient        Progress: improving  Outcome Evaluation: Pt has rested well in bed throughout shift. Scheduled N/V medivation given with relief. NS infusing at 100mL/hr. q4 BS check. Pt voices no needs at this time, call light within reach.

## 2022-12-10 NOTE — PROGRESS NOTES
[facility] Medicine Services  PROGRESS NOTE    Patient Name: Thelma Michael  : 1960  MRN: [id number]    Date of Admission: 2022  Primary Care Physician: Monet Howe APRN    Subjective   Subjective     CC:  Follow-up for diabetic gastroparesis    HPI:  Tolerating soft diet, mild nausea no emesis. Has had diarrhea over the last few days, only one episode this morning that is loose/ green, no blood. Mildly tender abdomen. Feels weak.     ROS:  Gen- No fevers, chills  CV- No chest pain, palpitations  Resp- No cough, dyspnea  GI- + N/D, + mild abd tenderness        Objective   Objective     Vital Signs:   Temp:  [98.5 °F (36.9 °C)-99.2 °F (37.3 °C)] 98.7 °F (37.1 °C)  Heart Rate:  [] 58  Resp:  [16-18] 16  BP: (109-173)/() 162/81     Physical Exam:  Constitutional: No acute distress, awake, alert  HENT: NCAT, mucous membranes moist  Respiratory: Clear to auscultation bilaterally, respiratory effort normal   Cardiovascular: Sinus bradycardia, no murmurs, rubs, or gallops  Gastrointestinal: Positive bowel sounds, soft, mildly tender upper abd, nondistended  Musculoskeletal: No bilateral ankle edema  Psychiatric: Appropriate affect, cooperative  Neurologic: Oriented x 3, strength symmetric in all extremities, Cranial Nerves grossly intact to confrontation, speech clear  Skin: No rashes     Results Reviewed:  LAB RESULTS:      Lab 12/10/22  0545 22  1151 22  1640 22  1324 22  0823 22  0507 22  0128   WBC 7.76 10.03  --  9.43  --   --  12.55*   HEMOGLOBIN 9.1* 10.7*  --  11.3*  --   --  13.3   HEMATOCRIT 26.3* 30.0*  --  32.9*  --   --  39.2   PLATELETS 258 284  --  330  --   --  385   NEUTROS ABS 6.08 7.99*  --  8.34*  --   --  11.52*   IMMATURE GRANS (ABS) 0.03 0.05  --  0.04  --   --  0.05   LYMPHS ABS 1.23 1.39  --  0.75  --   --  0.69*   MONOS ABS 0.42 0.59  --  0.29  --   --  0.26   EOS ABS 0.00 0.00  --  0.00   --   --  0.00   MCV 90.1 89.8  --  88.9  --   --  89.1   PROCALCITONIN  --   --   --   --   --   --  0.07   LACTATE  --   --  2.0 2.3* 5.6* 3.9*  --          Lab 12/10/22  0545 12/09/22  2208 12/09/22  1151 12/07/22  1324 12/07/22  0128   SODIUM 146*  --  145 144 141   POTASSIUM 3.7 3.8 3.2* 3.0* 3.3*   CHLORIDE 112*  --  109* 107 98   CO2 22.0  --  22.0 24.0 22.0   ANION GAP 12.0  --  14.0 13.0 21.0*   BUN 20  --  15 23 22   CREATININE 1.33*  --  0.99 1.17* 1.34*   EGFR 45.0*  --  64.2 52.5* 44.6*   GLUCOSE 189*  --  169* 252* 350*   CALCIUM 8.5*  --  9.3 9.6 10.1   MAGNESIUM 3.0*  --  1.7  --  1.7   PHOSPHORUS  --   --  2.2*  --   --    HEMOGLOBIN A1C  --   --   --  6.50*  --          Lab 12/09/22  1151 12/07/22  0128   TOTAL PROTEIN 6.8 8.5   ALBUMIN 4.20 4.70   GLOBULIN 2.6 3.8   ALT (SGPT) 12 14   AST (SGOT) 13 18   BILIRUBIN 0.5 0.6   ALK PHOS 78 111   LIPASE  --  23         Lab 12/09/22  1151 12/07/22  0128   TROPONIN T <0.010 <0.010                 Lab 12/07/22  0357   FIO2 21   CARBOXYHEMOGLOBIN (VENOUS) 1.0     Brief Urine Lab Results  (Last result in the past 365 days)      Color   Clarity   Blood   Leuk Est   Nitrite   Protein   CREAT   Urine HCG        12/07/22 0400 Yellow   Clear   Negative   Negative   Negative   >=300 mg/dL (3+)                 Microbiology Results Abnormal     Procedure Component Value - Date/Time    COVID PRE-OP / PRE-PROCEDURE SCREENING ORDER (NO ISOLATION) - Swab, Nasopharynx [[id number]]  (Normal) Collected: 12/06/22 3433    Lab Status: Final result Specimen: Swab from Nasopharynx Updated: 12/07/22 0022    Narrative:      The following orders were created for panel order COVID PRE-OP / PRE-PROCEDURE SCREENING ORDER (NO ISOLATION) - Swab, Nasopharynx.  Procedure                               Abnormality         Status                     ---------                               -----------         ------                     COVID-19 and FLU A/B PCR...[[id number]]  Normal               Final result                 Please view results for these tests on the individual orders.    COVID-19 and FLU A/B PCR - Swab, Nasopharynx [[id number]]  (Normal) Collected: 12/06/22 2333    Lab Status: Final result Specimen: Swab from Nasopharynx Updated: 12/07/22 0022     COVID19 Not Detected     Influenza A PCR Not Detected     Influenza B PCR Not Detected    Narrative:      Fact sheet for providers: https://www.fda.gov/media/929270/download    Fact sheet for patients: https://www.fda.gov/media/170834/download    Test performed by PCR.          Adult Transthoracic Echo Complete W/ Cont if Necessary Per Protocol    Result Date: 12/10/2022  •  Left ventricular ejection fraction appears to be greater than 70%. •  Left ventricular wall thickness is consistent with moderate concentric hypertrophy. •  Left ventricular diastolic function is consistent with (grade I) impaired relaxation. •  Estimated right ventricular systolic pressure from tricuspid regurgitation is mildly elevated (35-45 mmHg). Calculated right ventricular systolic pressure from tricuspid regurgitation is 39 mmHg.       Results for orders placed during the hospital encounter of 12/06/22    Adult Transthoracic Echo Complete W/ Cont if Necessary Per Protocol    Interpretation Summary  •  Left ventricular ejection fraction appears to be greater than 70%.  •  Left ventricular wall thickness is consistent with moderate concentric hypertrophy.  •  Left ventricular diastolic function is consistent with (grade I) impaired relaxation.  •  Estimated right ventricular systolic pressure from tricuspid regurgitation is mildly elevated (35-45 mmHg). Calculated right ventricular systolic pressure from tricuspid regurgitation is 39 mmHg.      I have reviewed the medications:  Scheduled Meds:aspirin, 81 mg, Oral, Daily  atorvastatin, 80 mg, Oral, Nightly  donepezil, 5 mg, Oral, Nightly  enoxaparin, 1 mg/kg, Subcutaneous, Q12H  FLUoxetine, 20 mg, Oral, Daily  insulin  lispro, 0-7 Units, Subcutaneous, TID AC  losartan, 50 mg, Oral, Q24H  megestrol, 400 mg, Oral, Daily  metoclopramide, 10 mg, Oral, 4x Daily AC & at Bedtime  metoprolol tartrate, 25 mg, Oral, Q12H  mirtazapine, 15 mg, Oral, Nightly  pantoprazole, 40 mg, Oral, Daily  senna-docusate sodium, 2 tablet, Oral, BID  sodium chloride, 10 mL, Intravenous, Q12H  terazosin, 1 mg, Oral, Nightly      Continuous Infusions:amiodarone, 1 mg/min, Last Rate: 1 mg/min (12/10/22 0701)  sodium chloride, 100 mL/hr, Last Rate: 100 mL/hr (12/10/22 1200)      PRN Meds:.•  acetaminophen **OR** acetaminophen **OR** acetaminophen  •  senna-docusate sodium **AND** polyethylene glycol **AND** bisacodyl **AND** bisacodyl  •  cloNIDine  •  dextrose  •  dextrose  •  glucagon (human recombinant)  •  influenza vaccine  •  melatonin  •  potassium chloride **OR** potassium chloride **OR** potassium chloride  •  promethazine  •  sodium chloride  •  sodium chloride  •  traZODone    Assessment & Plan   Assessment & Plan     Active Hospital Problems    Diagnosis  POA   • **Gastroparesis [K31.84]  Yes   • PFO (patent foramen ovale) [Q21.12]  Not Applicable   • Atrial fibrillation and flutter (HCC) [I48.91, I48.92]  Yes   • Elevated serum creatinine [R79.89]  Yes   • Leukocytosis [D72.829]  Yes   • Hypokalemia [E87.6]  Yes   • History of TIAs [Z86.73]  Not Applicable   • Uncontrolled type 1 diabetes mellitus with hyperglycemia (HCC) [E10.65]  Yes   • Nausea & vomiting [R11.2]  Yes   • Hypertensive urgency [I16.0]  Yes   • Heart valve disease [I38]  Yes   • Tobacco use [Z72.0]  Yes   • Hyperlipidemia [E78.5]  Yes   • Hypertension [I10]  Yes      Resolved Hospital Problems   No resolved problems to display.        Brief Hospital Course to date:  Thelma Michael is a 61 y.o. female with past medical history of type 1 diabetes on insulin pump, severe diabetic gastroparesis status post gastric stimulator implantation, following with gastroparesis clinic in  [place], essential hypertension, dyslipidemia, Tobacco use disorder who presented to the hospital with intractable nausea and vomiting and worsening fatigue    Assessment and plan:, Improving  Intractable nausea and vomiting  Diabetic gastroparesis flare  · Her intractable nausea and vomiting on admission is most likely related to her diabetic gastroparesis.  · Did not respond to Zofran but responding well to Phenergan with minimal nausea now  · Responded well to IV Reglan, continue with oral reglan for now   · Tolerating  GI soft diet  · Does not have much appetite, cont Megace  · Continue IV fluids (changed to 1/2NS today)  · Check and replenish electrolytes per protocol  · Consider GI consultation if no progressive improvement   · Has had diarrhea last 2 days, GI PCR today with Enteroaggregative Ecoli (EAEC)- will cont to monitor and give supportive care; more risk for treatment currently with Azith or Cipro due to prolonged QTc (555 12/9) in setting of Amio drip. (discussed with Dr Steward and pharmacy)    Atrial fibrillation with RVR  --new onset, no prev history  --ECHO pending   --Cardiology consultation   --started on IV Cardizem and converted to NSR 12/9  --started on IV Amio today and will need to start on NOAC at DC      Leukocytosis  · Likely reactive, resolved      Hypertensive Urgency   · Blood pressure is not well controlled.    · Continue amlodipine 10 mg. Added losartan 50 mg daily and as needed clonidine  · Improved      Mild RAFAT  · Likely secondary to dehydration  · Continue IV fluids and monitor kidney functions closely  · Creatinine increased again overnight to 1.3, cont fluids and repeat labs in AM     Hypernatremia   --sodium up to 146, changing IVF to 1/2NS and repeat labs in AM      Hypokalemia  Hypomagnesemia  · Replace per protocol     DM type 1  · A1c 6.5%  · Insulin sliding scale for now given her poor oral intake      Expected Discharge Location and Transportation: Home  Expected  Discharge Date: 12/12/2020    DVT prophylaxis:  Medical DVT prophylaxis orders are present.     AM-PAC 6 Clicks Score (PT): 24 (12/10/22 0800)    CODE STATUS:   Code Status and Medical Interventions:   Ordered at: 12/07/22 0501     Code Status (Patient has no pulse and is not breathing):    CPR (Attempt to Resuscitate)     Medical Interventions (Patient has pulse or is breathing):    Full Support       Susan Cohen, HOWIE  12/10/22

## 2022-12-10 NOTE — PLAN OF CARE
Goal Outcome Evaluation:  Plan of Care Reviewed With: patient   Pt c/o pain in the abdomen MD notified awaiting orders. No c/o SOA VSS. Pt a/o x 4. Meds administered as ordered. Pt up ad rosana to [facility].     Progress: no change

## 2022-12-11 LAB
GLUCOSE BLDC GLUCOMTR-MCNC: 123 MG/DL (ref 70–130)
GLUCOSE BLDC GLUCOMTR-MCNC: 126 MG/DL (ref 70–130)
GLUCOSE BLDC GLUCOMTR-MCNC: 127 MG/DL (ref 70–130)
GLUCOSE BLDC GLUCOMTR-MCNC: 156 MG/DL (ref 70–130)
GLUCOSE BLDC GLUCOMTR-MCNC: 169 MG/DL (ref 70–130)
GLUCOSE BLDC GLUCOMTR-MCNC: 191 MG/DL (ref 70–130)
QT INTERVAL: 438 MS
QTC INTERVAL: 426 MS

## 2022-12-11 PROCEDURE — 93010 ELECTROCARDIOGRAM REPORT: CPT | Performed by: INTERNAL MEDICINE

## 2022-12-11 PROCEDURE — 99232 SBSQ HOSP IP/OBS MODERATE 35: CPT | Performed by: INTERNAL MEDICINE

## 2022-12-11 PROCEDURE — 99232 SBSQ HOSP IP/OBS MODERATE 35: CPT | Performed by: NURSE PRACTITIONER

## 2022-12-11 PROCEDURE — 0 POTASSIUM CHLORIDE PER 2 MEQ: Performed by: NURSE PRACTITIONER

## 2022-12-11 PROCEDURE — 93005 ELECTROCARDIOGRAM TRACING: CPT | Performed by: INTERNAL MEDICINE

## 2022-12-11 PROCEDURE — 25010000002 ENOXAPARIN PER 10 MG: Performed by: PHYSICIAN ASSISTANT

## 2022-12-11 PROCEDURE — 63710000001 INSULIN LISPRO (HUMAN) PER 5 UNITS: Performed by: NURSE PRACTITIONER

## 2022-12-11 PROCEDURE — 25010000002 AMIODARONE IN DEXTROSE 5% 360-4.14 MG/200ML-% SOLUTION: Performed by: INTERNAL MEDICINE

## 2022-12-11 PROCEDURE — 82962 GLUCOSE BLOOD TEST: CPT

## 2022-12-11 RX ORDER — AMIODARONE HYDROCHLORIDE 200 MG/1
200 TABLET ORAL EVERY 12 HOURS SCHEDULED
Status: DISCONTINUED | OUTPATIENT
Start: 2022-12-11 | End: 2022-12-12

## 2022-12-11 RX ORDER — SODIUM CHLORIDE AND POTASSIUM CHLORIDE 150; 450 MG/100ML; MG/100ML
50 INJECTION, SOLUTION INTRAVENOUS CONTINUOUS
Status: DISCONTINUED | OUTPATIENT
Start: 2022-12-11 | End: 2022-12-11

## 2022-12-11 RX ADMIN — METOPROLOL TARTRATE 25 MG: 25 TABLET, FILM COATED ORAL at 20:10

## 2022-12-11 RX ADMIN — METOCLOPRAMIDE 10 MG: 10 TABLET ORAL at 17:25

## 2022-12-11 RX ADMIN — INSULIN LISPRO 2 UNITS: 100 INJECTION, SOLUTION INTRAVENOUS; SUBCUTANEOUS at 08:11

## 2022-12-11 RX ADMIN — METOPROLOL TARTRATE 25 MG: 25 TABLET, FILM COATED ORAL at 08:14

## 2022-12-11 RX ADMIN — AMIODARONE HYDROCHLORIDE 1 MG/MIN: 1.8 INJECTION, SOLUTION INTRAVENOUS at 05:22

## 2022-12-11 RX ADMIN — METOCLOPRAMIDE 10 MG: 10 TABLET ORAL at 05:20

## 2022-12-11 RX ADMIN — MIRTAZAPINE 15 MG: 15 TABLET, FILM COATED ORAL at 20:08

## 2022-12-11 RX ADMIN — ASPIRIN 81 MG: 81 TABLET, COATED ORAL at 08:14

## 2022-12-11 RX ADMIN — ATORVASTATIN CALCIUM 80 MG: 40 TABLET, FILM COATED ORAL at 20:08

## 2022-12-11 RX ADMIN — LOSARTAN POTASSIUM 50 MG: 50 TABLET, FILM COATED ORAL at 08:14

## 2022-12-11 RX ADMIN — DONEPEZIL HYDROCHLORIDE 5 MG: 10 TABLET, FILM COATED ORAL at 20:08

## 2022-12-11 RX ADMIN — ENOXAPARIN SODIUM 60 MG: 60 INJECTION SUBCUTANEOUS at 05:21

## 2022-12-11 RX ADMIN — CLONIDINE HYDROCHLORIDE 0.1 MG: 0.1 TABLET ORAL at 20:50

## 2022-12-11 RX ADMIN — METOCLOPRAMIDE 10 MG: 10 TABLET ORAL at 20:08

## 2022-12-11 RX ADMIN — AMIODARONE HYDROCHLORIDE 200 MG: 200 TABLET ORAL at 13:40

## 2022-12-11 RX ADMIN — TERAZOSIN HYDROCHLORIDE 1 MG: 1 CAPSULE ORAL at 20:09

## 2022-12-11 RX ADMIN — PANTOPRAZOLE SODIUM 40 MG: 40 TABLET, DELAYED RELEASE ORAL at 08:14

## 2022-12-11 RX ADMIN — MEGESTROL ACETATE 400 MG: 400 SUSPENSION ORAL at 08:15

## 2022-12-11 RX ADMIN — AMIODARONE HYDROCHLORIDE 200 MG: 200 TABLET ORAL at 20:17

## 2022-12-11 RX ADMIN — POTASSIUM CHLORIDE AND SODIUM CHLORIDE 50 ML/HR: 450; 150 INJECTION, SOLUTION INTRAVENOUS at 11:51

## 2022-12-11 RX ADMIN — METOCLOPRAMIDE 10 MG: 10 TABLET ORAL at 11:51

## 2022-12-11 RX ADMIN — FLUOXETINE 20 MG: 20 CAPSULE ORAL at 08:14

## 2022-12-11 RX ADMIN — ENOXAPARIN SODIUM 60 MG: 60 INJECTION SUBCUTANEOUS at 17:25

## 2022-12-11 NOTE — PROGRESS NOTES
[facility] at [facility]  IP Progress Note      Chief Complaint/Reason for service: #1 paroxysmal atrial flutter #2 hypertension    Subjective   Subjective: The patient is sleeping but awakens easily.  She states her nausea and vomiting is much better.  She is eating soft food.  She denies shortness of breath    Past medical, surgical, social and family history reviewed in the patient's electronic medical record.    Objective     Vital Sign Min/Max for last 24 hours  Temp  Min: 98.7 °F (37.1 °C)  Max: 99.3 °F (37.4 °C)   BP  Min: 133/59  Max: 198/89   Pulse  Min: 56  Max: 80   Resp  Min: 16  Max: 16   SpO2  Min: 95 %  Max: 100 %   No data recorded      Intake/Output Summary (Last 24 hours) at 12/11/2022 0651  Last data filed at 12/11/2022 0028  Gross per 24 hour   Intake --   Output 900 ml   Net -900 ml             Current Facility-Administered Medications:   •  acetaminophen (TYLENOL) tablet 650 mg, 650 mg, Oral, Q4H PRN **OR** acetaminophen (TYLENOL) 160 MG/5ML solution 650 mg, 650 mg, Oral, Q4H PRN **OR** acetaminophen (TYLENOL) suppository 650 mg, 650 mg, Rectal, Q4H PRN, Eli Zavala APRN  •  amiodarone 360 mg in 200 mL D5W infusion, 0.5 mg/min, Intravenous, Continuous, Samm Sanders MD, Last Rate: 33.3 mL/hr at 12/11/22 0522, 1 mg/min at 12/11/22 0522  •  aspirin EC tablet 81 mg, 81 mg, Oral, Daily, Eli Zavala APRN, 81 mg at 12/10/22 0813  •  atorvastatin (LIPITOR) tablet 80 mg, 80 mg, Oral, Nightly, Eli Zavala APRN, 80 mg at 12/10/22 2016  •  sennosides-docusate (PERICOLACE) 8.6-50 MG per tablet 2 tablet, 2 tablet, Oral, BID, 2 tablet at 12/07/22 2150 **AND** polyethylene glycol (MIRALAX) packet 17 g, 17 g, Oral, Daily PRN **AND** bisacodyl (DULCOLAX) EC tablet 5 mg, 5 mg, Oral, Daily PRN **AND** bisacodyl (DULCOLAX) suppository 10 mg, 10 mg, Rectal, Daily PRN, Eli Zavala, APRN  •  cloNIDine (CATAPRES) tablet 0.1 mg, 0.1 mg, Oral, Q8H PRN, Messi,  Shirin Butler MD, 0.1 mg at 12/10/22 1742  •  dextrose (D50W) (25 g/50 mL) IV injection 25 g, 25 g, Intravenous, Q15 Min PRN, Eli Zavala APRN  •  dextrose (GLUTOSE) oral gel 15 g, 15 g, Oral, Q15 Min PRN, Eli Zavala APRN  •  donepezil (ARICEPT) tablet 5 mg, 5 mg, Oral, Nightly, Eli Zavala APRN, 5 mg at 12/10/22 2016  •  Enoxaparin Sodium (LOVENOX) syringe 60 mg, 1 mg/kg, Subcutaneous, Q12H, Zana Torres PA, 60 mg at 12/11/22 0521  •  FLUoxetine (PROzac) capsule 20 mg, 20 mg, Oral, Daily, Eli Zavala APRN, 20 mg at 12/10/22 0813  •  glucagon (human recombinant) (GLUCAGEN DIAGNOSTIC) injection 1 mg, 1 mg, Intramuscular, Q15 Min PRN, Eli Zavala APRN  •  influenza vac split quad (FLUZONE,FLUARIX,AFLURIA,FLULAVAL) injection 0.5 mL, 0.5 mL, Intramuscular, During Hospitalization, Kavon Cramer, DO  •  Insulin Lispro (humaLOG) injection 0-7 Units, 0-7 Units, Subcutaneous, TID AC, Eli Zavala APRN, 3 Units at 12/10/22 1159  •  losartan (COZAAR) tablet 50 mg, 50 mg, Oral, Q24H, Shirin Swenson MD, 50 mg at 12/10/22 0813  •  megestrol (MEGACE) 40 MG/ML suspension 400 mg, 400 mg, Oral, Daily, Shirin Swenson MD, 400 mg at 12/10/22 0815  •  melatonin tablet 5 mg, 5 mg, Oral, Nightly PRN, Eli Zavala APRN, 5 mg at 12/10/22 6108  •  metoclopramide (REGLAN) tablet 10 mg, 10 mg, Oral, 4x Daily AC & at Bedtime, Susan Cohen APRN, 10 mg at 12/11/22 0520  •  metoprolol tartrate (LOPRESSOR) tablet 25 mg, 25 mg, Oral, Q12H, Shirin Swenson MD, 25 mg at 12/10/22 2016  •  mirtazapine (REMERON) tablet 15 mg, 15 mg, Oral, Nightly, Eli Zavala APRN, 15 mg at 12/10/22 2016  •  pantoprazole (PROTONIX) EC tablet 40 mg, 40 mg, Oral, Daily, Eli Zavala APRN, 40 mg at 12/10/22 0813  •  potassium chloride (MICRO-K) CR capsule 40 mEq, 40 mEq, Oral, PRN, 40 mEq at 12/09/22 1724 **OR** potassium chloride (KLOR-CON) packet 40 mEq, 40 mEq, Oral, PRN **OR**  potassium chloride 10 mEq in 100 mL IVPB, 10 mEq, Intravenous, Q1H PRN, Eli Zavala, APRN  •  promethazine (PHENERGAN) 12.5 mg in sodium chloride 0.9 % 50 mL, 12.5 mg, Intravenous, Q4H PRN, Shirin Swenson MD, 12.5 mg at 12/08/22 1323  •  sodium chloride 0.9 % flush 10 mL, 10 mL, Intravenous, Q12H, Eli Zavala, APRN, 10 mL at 12/10/22 2016  •  sodium chloride 0.9 % flush 10 mL, 10 mL, Intravenous, PRN, Eli Zavala, APRN  •  sodium chloride 0.9 % infusion 40 mL, 40 mL, Intravenous, PRN, Eli Zavala, APRN  •  terazosin (HYTRIN) capsule 1 mg, 1 mg, Oral, Nightly, Eli Zavala, APRN, 1 mg at 12/10/22 2016  •  traZODone (DESYREL) tablet 50 mg, 50 mg, Oral, Nightly PRN, Kavon Cramer, , 50 mg at 12/10/22 8390    Physical Exam: General well-developed female lying on her right side not dyspneic not tachypneic current heart rate 69        HEENT: No JVP       Respiratory: Equal bilateral symmetrical expansion, auscultation bilaterally       Cardiovascular: Regular rate and rhythm without murmur and no edema to palpation             Lower Extremities: No lesions       Neuro: Facial expressions are symmetrical       Skin: Warm and dry no edema to palpation       Psych: Pleasant affect    Results Review: The patient is a net 4.4 L ahead.  Heart rates in the 60s.  Blood pressures 1 52-1 98.  QTc is 465    Radiology Results:  Imaging Results (Last 72 Hours)     ** No results found for the last 72 hours. **          EKG: Sinus rhythm QTc 465    ECHO: Normal EF    Tele: Sinus rhythm    Assessment   Assessment/Plan: 1 paroxysmal atrial flutter-the patient converted to sinus rhythm yesterday and was placed on IV amiodarone because she had  been unable to tolerate p.o. Her QTC is acceptable at 465.  I will decrease the amiodarone drip 0.5  mg/min and recheck EKG in a.m. it is quite possible that if the patient is able to take p.o. intake today without any nausea that we can switch her over from  IV amiodarone to p.o. amiodarone    Samm Sanders MD  12/11/22  06:51 EST

## 2022-12-11 NOTE — PROGRESS NOTES
[facility]  PROGRESS NOTE    Patient Name: Thelma Michael  : 1960  MRN: [id number]    Date of Admission: 2022  Primary Care Physician: Monet Howe APRN    Subjective   Subjective     CC:  Follow-up for diabetic gastroparesis    HPI:  Still some nausea and poor intake, but no vomiting and able to keep in some fluids and meds  + abdominal pain  Weakness  Diarrhea seems improved  ROS:  Gen- No fevers, chills, + weakness  CV- No chest pain, palpitations  Resp- No cough, dyspnea  GI- + N/D, + mild abd tenderness        Objective   Objective     Vital Signs:   Temp:  [98.2 °F (36.8 °C)-99.3 °F (37.4 °C)] 98.2 °F (36.8 °C)  Heart Rate:  [54-80] 54  Resp:  [16-18] 18  BP: (152-198)/(70-89) 154/70     Physical Exam:  Constitutional: No acute distress, awake, alert  HENT: NCAT, mucous membranes moist  Respiratory: Clear to auscultation bilaterally, respiratory effort normal   Cardiovascular: Sinus bradycardia, no murmurs, rubs, or gallops  Gastrointestinal: Positive bowel sounds, soft, not significantly tender, nondistended  Musculoskeletal: No bilateral ankle edema  Psychiatric: Appropriate affect, cooperative  Neurologic: Oriented x 3, strength symmetric in all extremities, Cranial Nerves grossly intact to confrontation, speech clear  Skin: No rashes     Results Reviewed:  LAB RESULTS:      Lab 12/10/22  0545 22  1151 22  1640 22  1324 22  0823 22  0507 22  0128   WBC 7.76 10.03  --  9.43  --   --  12.55*   HEMOGLOBIN 9.1* 10.7*  --  11.3*  --   --  13.3   HEMATOCRIT 26.3* 30.0*  --  32.9*  --   --  39.2   PLATELETS 258 284  --  330  --   --  385   NEUTROS ABS 6.08 7.99*  --  8.34*  --   --  11.52*   IMMATURE GRANS (ABS) 0.03 0.05  --  0.04  --   --  0.05   LYMPHS ABS 1.23 1.39  --  0.75  --   --  0.69*   MONOS ABS 0.42 0.59  --  0.29  --   --  0.26   EOS ABS 0.00 0.00  --  0.00  --   --  0.00   MCV 90.1 89.8   --  88.9  --   --  89.1   PROCALCITONIN  --   --   --   --   --   --  0.07   LACTATE  --   --  2.0 2.3* 5.6* 3.9*  --          Lab 12/10/22  0545 12/09/22  2208 12/09/22  1151 12/07/22  1324 12/07/22  0128   SODIUM 146*  --  145 144 141   POTASSIUM 3.7 3.8 3.2* 3.0* 3.3*   CHLORIDE 112*  --  109* 107 98   CO2 22.0  --  22.0 24.0 22.0   ANION GAP 12.0  --  14.0 13.0 21.0*   BUN 20  --  15 23 22   CREATININE 1.33*  --  0.99 1.17* 1.34*   EGFR 45.0*  --  64.2 52.5* 44.6*   GLUCOSE 189*  --  169* 252* 350*   CALCIUM 8.5*  --  9.3 9.6 10.1   MAGNESIUM 3.0*  --  1.7  --  1.7   PHOSPHORUS  --   --  2.2*  --   --    HEMOGLOBIN A1C  --   --   --  6.50*  --          Lab 12/09/22  1151 12/07/22  0128   TOTAL PROTEIN 6.8 8.5   ALBUMIN 4.20 4.70   GLOBULIN 2.6 3.8   ALT (SGPT) 12 14   AST (SGOT) 13 18   BILIRUBIN 0.5 0.6   ALK PHOS 78 111   LIPASE  --  23         Lab 12/09/22  1151 12/07/22  0128   TROPONIN T <0.010 <0.010                 Lab 12/07/22  0357   FIO2 21   CARBOXYHEMOGLOBIN (VENOUS) 1.0     Brief Urine Lab Results  (Last result in the past 365 days)      Color   Clarity   Blood   Leuk Est   Nitrite   Protein   CREAT   Urine HCG        12/07/22 0400 Yellow   Clear   Negative   Negative   Negative   >=300 mg/dL (3+)                 Microbiology Results Abnormal     Procedure Component Value - Date/Time    COVID PRE-OP / PRE-PROCEDURE SCREENING ORDER (NO ISOLATION) - Swab, Nasopharynx [[id number]]  (Normal) Collected: 12/06/22 2753    Lab Status: Final result Specimen: Swab from Nasopharynx Updated: 12/07/22 0022    Narrative:      The following orders were created for panel order COVID PRE-OP / PRE-PROCEDURE SCREENING ORDER (NO ISOLATION) - Swab, Nasopharynx.  Procedure                               Abnormality         Status                     ---------                               -----------         ------                     COVID-19 and FLU A/B PCR...[[id number]]  Normal              Final result                  Please view results for these tests on the individual orders.    COVID-19 and FLU A/B PCR - Swab, Nasopharynx [[id number]]  (Normal) Collected: 12/06/22 2333    Lab Status: Final result Specimen: Swab from Nasopharynx Updated: 12/07/22 0022     COVID19 Not Detected     Influenza A PCR Not Detected     Influenza B PCR Not Detected    Narrative:      Fact sheet for providers: https://www.fda.gov/media/630858/download    Fact sheet for patients: https://www.fda.gov/media/660606/download    Test performed by PCR.          Adult Transthoracic Echo Complete W/ Cont if Necessary Per Protocol    Result Date: 12/10/2022  •  Left ventricular ejection fraction appears to be greater than 70%. •  Left ventricular wall thickness is consistent with moderate concentric hypertrophy. •  Left ventricular diastolic function is consistent with (grade I) impaired relaxation. •  Estimated right ventricular systolic pressure from tricuspid regurgitation is mildly elevated (35-45 mmHg). Calculated right ventricular systolic pressure from tricuspid regurgitation is 39 mmHg.       Results for orders placed during the hospital encounter of 12/06/22    Adult Transthoracic Echo Complete W/ Cont if Necessary Per Protocol    Interpretation Summary  •  Left ventricular ejection fraction appears to be greater than 70%.  •  Left ventricular wall thickness is consistent with moderate concentric hypertrophy.  •  Left ventricular diastolic function is consistent with (grade I) impaired relaxation.  •  Estimated right ventricular systolic pressure from tricuspid regurgitation is mildly elevated (35-45 mmHg). Calculated right ventricular systolic pressure from tricuspid regurgitation is 39 mmHg.      I have reviewed the medications:  Scheduled Meds:aspirin, 81 mg, Oral, Daily  atorvastatin, 80 mg, Oral, Nightly  donepezil, 5 mg, Oral, Nightly  enoxaparin, 1 mg/kg, Subcutaneous, Q12H  FLUoxetine, 20 mg, Oral, Daily  insulin lispro, 0-7 Units,  Subcutaneous, TID AC  losartan, 50 mg, Oral, Q24H  megestrol, 400 mg, Oral, Daily  metoclopramide, 10 mg, Oral, 4x Daily AC & at Bedtime  metoprolol tartrate, 25 mg, Oral, Q12H  mirtazapine, 15 mg, Oral, Nightly  pantoprazole, 40 mg, Oral, Daily  senna-docusate sodium, 2 tablet, Oral, BID  sodium chloride, 10 mL, Intravenous, Q12H  terazosin, 1 mg, Oral, Nightly      Continuous Infusions:amiodarone, 0.5 mg/min, Last Rate: 0.5 mg/min (12/11/22 0652)  sodium chloride 0.45 % with KCl 20 mEq, 50 mL/hr      PRN Meds:.•  acetaminophen **OR** acetaminophen **OR** acetaminophen  •  senna-docusate sodium **AND** polyethylene glycol **AND** bisacodyl **AND** bisacodyl  •  cloNIDine  •  dextrose  •  dextrose  •  glucagon (human recombinant)  •  influenza vaccine  •  melatonin  •  potassium chloride **OR** potassium chloride **OR** potassium chloride  •  promethazine  •  sodium chloride  •  sodium chloride  •  traZODone    Assessment & Plan   Assessment & Plan     Active Hospital Problems    Diagnosis  POA   • **Gastroparesis [K31.84]  Yes   • PFO (patent foramen ovale) [Q21.12]  Not Applicable   • Atrial fibrillation and flutter (HCC) [I48.91, I48.92]  Yes   • Elevated serum creatinine [R79.89]  Yes   • Leukocytosis [D72.829]  Yes   • Hypokalemia [E87.6]  Yes   • History of TIAs [Z86.73]  Not Applicable   • Uncontrolled type 1 diabetes mellitus with hyperglycemia (HCC) [E10.65]  Yes   • Nausea & vomiting [R11.2]  Yes   • Hypertensive urgency [I16.0]  Yes   • Heart valve disease [I38]  Yes   • Tobacco use [Z72.0]  Yes   • Hyperlipidemia [E78.5]  Yes   • Hypertension [I10]  Yes      Resolved Hospital Problems   No resolved problems to display.        Brief Hospital Course to date:  Thelma Mihcael is a 61 y.o. female with past medical history of type 1 diabetes on insulin pump, severe diabetic gastroparesis status post gastric stimulator implantation, following with gastroparesis clinic in [facility]  hypertension, dyslipidemia, Tobacco use disorder who presented to the hospital with intractable nausea and vomiting and worsening fatigue    This patient's problems and plans were partially entered by my partner and updated as appropriate by me 12/11/22.      Assessment and plan:, Improving  Intractable nausea and vomiting  Diabetic gastroparesis flare  · Her intractable nausea and vomiting on admission is most likely related to her diabetic gastroparesis.  · Did not respond to Zofran but responding to scheduled reglan and prn promethazine  · Tolerating  GI soft diet, but poor intake, cont Megace  · Continue IV fluids (changed to 1/2NS +20K)  · Check and replenish electrolytes per protocol  · Has had diarrhea last 2 days, GI PCR today with Enteroaggregative Ecoli (EAEC)- will cont to monitor and give supportive care; more risk for treatment currently with Azith or Cipro due to prolonged QTc (555 12/9) in setting of Amio drip. Currently seems improved  · Am bmp    Atrial fibrillation with RVR  --new onset, no prev history  --ECHO 70% EF  --Cardiology following  --started on IV Cardizem and converted to NSR 12/9. Remains on gtt due to poor intake. Rate decreased with plans to convert to oral soon per Dr. Sanders  --NOAC at DC     Leukocytosis  · Likely reactive, resolved      Hypertensive Urgency   · Blood pressure is not well controlled.    · Added losartan 50 mg daily and as needed clonidine     Mild RAFAT  · Likely secondary to dehydration  · Creatinine 1.3, cont fluids and repeat labs in AM     Hypernatremia   --resolved with IVF change     Hypokalemia  Hypomagnesemia  · Replace per protocol     DM type 1  · A1c 6.5%  · Insulin sliding scale for now given her poor oral intake, stable      Expected Discharge Location and Transportation: Home  Expected Discharge Date: 12/12/2020    DVT prophylaxis:  Medical DVT prophylaxis orders are present.     AM-PAC 6 Clicks Score (PT): 24 (12/10/22 0800)    CODE STATUS:   Code  Status and Medical Interventions:   Ordered at: 12/07/22 0501     Code Status (Patient has no pulse and is not breathing):    CPR (Attempt to Resuscitate)     Medical Interventions (Patient has pulse or is breathing):    Full Support       Sarah Moody, APRN  12/11/22

## 2022-12-11 NOTE — PLAN OF CARE
Goal Outcome Evaluation:  Plan of Care Reviewed With: patient   Pt a/o x 4 on RA VSS. IV was accidentally pulled out by pt [facility] attempted an US placed IV unable to obtain placement JAMESON Richardson for cards and Dr Steward notified orders received to leave IV out and meds adjusted. Pt tolerating PO intake x 2 days no c/o N/V. No c/o pain or SOA. Meds administered as ordered.   Progress: improving

## 2022-12-11 NOTE — PLAN OF CARE
Goal Outcome Evaluation:  Plan of Care Reviewed With: patient        Progress: improving  Outcome Evaluation: pt rested well throughout the night, VSS on RA., IVFs & Amio gtt continue as ordered

## 2022-12-12 ENCOUNTER — READMISSION MANAGEMENT (OUTPATIENT)
Dept: CALL CENTER | Facility: HOSPITAL | Age: 62
End: 2022-12-12

## 2022-12-12 VITALS
HEART RATE: 70 BPM | TEMPERATURE: 98.2 F | OXYGEN SATURATION: 99 % | HEIGHT: 68 IN | RESPIRATION RATE: 18 BRPM | BODY MASS INDEX: 21.55 KG/M2 | WEIGHT: 142.2 LBS | SYSTOLIC BLOOD PRESSURE: 176 MMHG | DIASTOLIC BLOOD PRESSURE: 79 MMHG

## 2022-12-12 PROBLEM — R79.89 ELEVATED SERUM CREATININE: Status: RESOLVED | Noted: 2022-02-11 | Resolved: 2022-12-12

## 2022-12-12 PROBLEM — R11.2 NAUSEA & VOMITING: Status: RESOLVED | Noted: 2021-05-01 | Resolved: 2022-12-12

## 2022-12-12 PROBLEM — I16.0 HYPERTENSIVE URGENCY: Status: RESOLVED | Noted: 2021-05-01 | Resolved: 2022-12-12

## 2022-12-12 PROBLEM — D72.829 LEUKOCYTOSIS: Status: RESOLVED | Noted: 2022-02-11 | Resolved: 2022-12-12

## 2022-12-12 LAB
ANION GAP SERPL CALCULATED.3IONS-SCNC: 12 MMOL/L (ref 5–15)
BUN SERPL-MCNC: 14 MG/DL (ref 8–23)
BUN/CREAT SERPL: 15.1 (ref 7–25)
CALCIUM SPEC-SCNC: 8.8 MG/DL (ref 8.6–10.5)
CHLORIDE SERPL-SCNC: 106 MMOL/L (ref 98–107)
CO2 SERPL-SCNC: 25 MMOL/L (ref 22–29)
CREAT SERPL-MCNC: 0.93 MG/DL (ref 0.57–1)
EGFRCR SERPLBLD CKD-EPI 2021: 69.2 ML/MIN/1.73
GLUCOSE BLDC GLUCOMTR-MCNC: 123 MG/DL (ref 70–130)
GLUCOSE BLDC GLUCOMTR-MCNC: 136 MG/DL (ref 70–130)
GLUCOSE BLDC GLUCOMTR-MCNC: 240 MG/DL (ref 70–130)
GLUCOSE SERPL-MCNC: 119 MG/DL (ref 65–99)
POTASSIUM SERPL-SCNC: 3.1 MMOL/L (ref 3.5–5.2)
QT INTERVAL: 470 MS
QT INTERVAL: 480 MS
QTC INTERVAL: 465 MS
QTC INTERVAL: 475 MS
SODIUM SERPL-SCNC: 143 MMOL/L (ref 136–145)

## 2022-12-12 PROCEDURE — 25010000002 ENOXAPARIN PER 10 MG: Performed by: PHYSICIAN ASSISTANT

## 2022-12-12 PROCEDURE — 93010 ELECTROCARDIOGRAM REPORT: CPT | Performed by: INTERNAL MEDICINE

## 2022-12-12 PROCEDURE — 63710000001 INSULIN LISPRO (HUMAN) PER 5 UNITS: Performed by: NURSE PRACTITIONER

## 2022-12-12 PROCEDURE — 99239 HOSP IP/OBS DSCHRG MGMT >30: CPT | Performed by: NURSE PRACTITIONER

## 2022-12-12 PROCEDURE — 99232 SBSQ HOSP IP/OBS MODERATE 35: CPT | Performed by: INTERNAL MEDICINE

## 2022-12-12 PROCEDURE — 93005 ELECTROCARDIOGRAM TRACING: CPT | Performed by: INTERNAL MEDICINE

## 2022-12-12 PROCEDURE — 80048 BASIC METABOLIC PNL TOTAL CA: CPT | Performed by: NURSE PRACTITIONER

## 2022-12-12 PROCEDURE — 82962 GLUCOSE BLOOD TEST: CPT

## 2022-12-12 RX ORDER — MEGESTROL ACETATE 40 MG/ML
400 SUSPENSION ORAL DAILY
Qty: 300 ML | Refills: 0 | Status: SHIPPED | OUTPATIENT
Start: 2022-12-13 | End: 2023-01-12

## 2022-12-12 RX ORDER — LOSARTAN POTASSIUM 50 MG/1
50 TABLET ORAL
Qty: 30 TABLET | Refills: 0 | Status: SHIPPED | OUTPATIENT
Start: 2022-12-13 | End: 2023-01-20 | Stop reason: SDUPTHER

## 2022-12-12 RX ORDER — ENOXAPARIN SODIUM 100 MG/ML
1 INJECTION SUBCUTANEOUS EVERY 12 HOURS
Status: DISCONTINUED | OUTPATIENT
Start: 2022-12-12 | End: 2022-12-12

## 2022-12-12 RX ORDER — METOCLOPRAMIDE 10 MG/1
10 TABLET ORAL
Qty: 120 TABLET | Refills: 0 | Status: SHIPPED | OUTPATIENT
Start: 2022-12-12 | End: 2023-01-11

## 2022-12-12 RX ORDER — CARVEDILOL 6.25 MG/1
6.25 TABLET ORAL 2 TIMES DAILY WITH MEALS
Status: DISCONTINUED | OUTPATIENT
Start: 2022-12-12 | End: 2022-12-12 | Stop reason: HOSPADM

## 2022-12-12 RX ORDER — TRAMADOL HYDROCHLORIDE 50 MG/1
50 TABLET ORAL EVERY 6 HOURS PRN
Status: DISCONTINUED | OUTPATIENT
Start: 2022-12-12 | End: 2022-12-12 | Stop reason: HOSPADM

## 2022-12-12 RX ORDER — DOXAZOSIN 2 MG/1
2 TABLET ORAL NIGHTLY
Qty: 30 TABLET | Refills: 0 | Status: SHIPPED | OUTPATIENT
Start: 2022-12-12

## 2022-12-12 RX ORDER — CARVEDILOL 6.25 MG/1
6.25 TABLET ORAL 2 TIMES DAILY WITH MEALS
Qty: 60 TABLET | Refills: 0 | Status: SHIPPED | OUTPATIENT
Start: 2022-12-12 | End: 2023-03-16

## 2022-12-12 RX ADMIN — INSULIN LISPRO 3 UNITS: 100 INJECTION, SOLUTION INTRAVENOUS; SUBCUTANEOUS at 11:48

## 2022-12-12 RX ADMIN — METOCLOPRAMIDE 10 MG: 10 TABLET ORAL at 10:33

## 2022-12-12 RX ADMIN — POTASSIUM CHLORIDE 40 MEQ: 750 CAPSULE, EXTENDED RELEASE ORAL at 10:55

## 2022-12-12 RX ADMIN — ACETAMINOPHEN 650 MG: 325 TABLET, FILM COATED ORAL at 07:48

## 2022-12-12 RX ADMIN — CARVEDILOL 6.25 MG: 6.25 TABLET, FILM COATED ORAL at 16:36

## 2022-12-12 RX ADMIN — TRAMADOL HYDROCHLORIDE 50 MG: 50 TABLET, COATED ORAL at 11:48

## 2022-12-12 RX ADMIN — CARVEDILOL 6.25 MG: 6.25 TABLET, FILM COATED ORAL at 10:33

## 2022-12-12 RX ADMIN — FLUOXETINE 20 MG: 20 CAPSULE ORAL at 10:33

## 2022-12-12 RX ADMIN — RIVAROXABAN 15 MG: 15 TABLET, FILM COATED ORAL at 16:37

## 2022-12-12 RX ADMIN — METOCLOPRAMIDE 10 MG: 10 TABLET ORAL at 16:36

## 2022-12-12 RX ADMIN — POTASSIUM CHLORIDE 40 MEQ: 750 CAPSULE, EXTENDED RELEASE ORAL at 16:36

## 2022-12-12 RX ADMIN — PANTOPRAZOLE SODIUM 40 MG: 40 TABLET, DELAYED RELEASE ORAL at 10:33

## 2022-12-12 RX ADMIN — LOSARTAN POTASSIUM 50 MG: 50 TABLET, FILM COATED ORAL at 10:33

## 2022-12-12 RX ADMIN — DOCUSATE SODIUM 50 MG AND SENNOSIDES 8.6 MG 2 TABLET: 8.6; 5 TABLET, FILM COATED ORAL at 10:33

## 2022-12-12 RX ADMIN — MEGESTROL ACETATE 400 MG: 400 SUSPENSION ORAL at 10:38

## 2022-12-12 RX ADMIN — ENOXAPARIN SODIUM 60 MG: 60 INJECTION SUBCUTANEOUS at 05:14

## 2022-12-12 NOTE — PLAN OF CARE
Problem: Adult Inpatient Plan of Care  Goal: Absence of Hospital-Acquired Illness or Injury  Intervention: Identify and Manage Fall Risk  Recent Flowsheet Documentation  Taken 12/12/2022 0400 by Atiya Huerta RN  Safety Promotion/Fall Prevention:   assistive device/personal items within reach   activity supervised   safety round/check completed  Taken 12/12/2022 0200 by Atiya Huerta RN  Safety Promotion/Fall Prevention:   activity supervised   assistive device/personal items within reach   safety round/check completed  Taken 12/12/2022 0000 by Atiya Huerta RN  Safety Promotion/Fall Prevention:   activity supervised   assistive device/personal items within reach   safety round/check completed  Taken 12/11/2022 2200 by Atiya Huerta RN  Safety Promotion/Fall Prevention:   activity supervised   assistive device/personal items within reach   safety round/check completed  Taken 12/11/2022 2000 by Atiya Huerta RN  Safety Promotion/Fall Prevention:   activity supervised   assistive device/personal items within reach   safety round/check completed     Problem: Adult Inpatient Plan of Care  Goal: Absence of Hospital-Acquired Illness or Injury  Intervention: Prevent Skin Injury  Recent Flowsheet Documentation  Taken 12/12/2022 0400 by Atiya Huerta RN  Body Position: position changed independently  Taken 12/12/2022 0200 by Atiya Huerta RN  Body Position: position changed independently  Taken 12/12/2022 0000 by Atiya Huerta RN  Body Position: position changed independently  Taken 12/11/2022 2200 by Atiya Huerta RN  Body Position: position changed independently  Taken 12/11/2022 2000 by Atiya Huerta RN  Body Position: position changed independently     Problem: Adult Inpatient Plan of Care  Goal: Absence of Hospital-Acquired Illness or Injury  Intervention: Prevent and Manage VTE (Venous Thromboembolism) Risk  Recent Flowsheet Documentation  Taken 12/12/2022 0400 by Atiya Huerta RN  Activity Management: activity  adjusted per tolerance  Taken 12/12/2022 0200 by Atiya Huerta, SUKH  Activity Management: activity adjusted per tolerance  Taken 12/12/2022 0000 by Atiya Huerta RN  Activity Management: activity adjusted per tolerance  Taken 12/11/2022 2200 by Atiya Huerta, SUKH  Activity Management: activity adjusted per tolerance  Taken 12/11/2022 2000 by Atiya Huerta, SUKH  Activity Management: activity adjusted per tolerance   Goal Outcome Evaluation:No acute events overnight. VSS stable on RA.           Progress: improving

## 2022-12-12 NOTE — CASE MANAGEMENT/SOCIAL WORK
Continued Stay Note  [facility]     Patient Name: Thelma Michael  MRN: [id number]  Today's Date: 12/12/2022    Admit Date: 12/6/2022    Plan: HOME   Discharge Plan     Row Name 12/12/22 1202       Plan    Plan HOME    Patient/Family in Agreement with Plan yes    Plan Comments Met with pt at bedside to f/u DCP.  Plan remains to return home upon DC.  No immediate needs identified/voiced.    Final Discharge Disposition Code 01 - home or self-care               Discharge Codes    No documentation.               Expected Discharge Date and Time     Expected Discharge Date Expected Discharge Time    Dec 12, 2022             Margaret Gallegos RN

## 2022-12-12 NOTE — OUTREACH NOTE
Prep Survey    Flowsheet Row Responses   [facility] facility patient discharged from? [place]   Is LACE score < 7 ? No   Emergency Room discharge w/ pulse ox? No   Eligibility [facility]   Date of Admission 12/06/22   Date of Discharge 12/12/22   Discharge Disposition Home or Self Care   Discharge diagnosis Gastroparesis Atrial fibrillation and flutter Elevated serum creatinine    Does the patient have one of the following disease processes/diagnoses(primary or secondary)? Other   Does the patient have Home health ordered? No   Is there a DME ordered? No   Prep survey completed? Yes          ALEENA FROST - Registered Nurse

## 2022-12-12 NOTE — PROGRESS NOTES
Patient is on Xarelto.  Education provided on 12/12/22 verbally and in writing.  Information provided includes effects of medication, drug-drug and drug-food interactions, and signs/symptoms of bleeding and clotting.  Patient verbalized understanding through teach back.  All pertinent questions were answered.       Tyra Redmond, PharmD, BCPS   12/12/2022  13:00 EST

## 2022-12-12 NOTE — DISCHARGE SUMMARY
[facility] Medicine Services  DISCHARGE SUMMARY    Patient Name: Thelma Michael  : 1960  MRN: [id number]    Date of Admission: 2022 10:59 PM  Date of Discharge:  2022  Primary Care Physician: Monet Howe APRN    Consults     Date and Time Order Name Status Description    2022  3:18 PM Inpatient Cardiology Consult Completed           Hospital Course     Presenting Problem:   Nausea and vomiting, unspecified vomiting type [R11.2]    Active Hospital Problems    Diagnosis  POA   • **Gastroparesis [K31.84]  Yes   • PFO (patent foramen ovale) [Q21.12]  Not Applicable   • Atrial fibrillation and flutter (HCC) [I48.91, I48.92]  Yes   • Hypokalemia [E87.6]  Yes   • History of TIAs [Z86.73]  Not Applicable   • Uncontrolled type 1 diabetes mellitus with hyperglycemia (HCC) [E10.65]  Yes   • Heart valve disease [I38]  Yes   • Tobacco use [Z72.0]  Yes   • Hyperlipidemia [E78.5]  Yes   • Hypertension [I10]  Yes      Resolved Hospital Problems    Diagnosis Date Resolved POA   • Elevated serum creatinine [R79.89] 2022 Yes   • Leukocytosis [D72.829] 2022 Yes   • Nausea & vomiting [R11.2] 2022 Yes   • Hypertensive urgency [I16.0] 2022 Yes          Hospital Course:  Thelma Michael is a 61 y.o. female  with past medical history of type 1 diabetes on insulin pump, severe diabetic gastroparesis status post gastric stimulator implantation, following with gastroparesis clinic in [place], essential hypertension, dyslipidemia, Tobacco use disorder who presented to the hospital with intractable nausea and vomiting and worsening fatigue     Intractable nausea and vomiting- resolved  Diabetic gastroparesis flare- resolved  Diarrhea- resolved, EAEC +  • Her intractable nausea and vomiting on admission is most likely related to her diabetic gastroparesis.  • Did not respond to Zofran but responded to scheduled reglan and prn promethazine- will  continue prn reglan at DC  • Tolerating  GI soft diet, but poor intake, cont Megace which she feels is helping  • S/p IVF  • replaced electrolytes per protocol  • Has had diarrhea, GI PCR with Enteroaggregative Ecoli (EAEC)- antibiotics started as more risk for treatment with Azith or Cipro due to prolonged QTc (555 12/9) in setting of Amio. Currently resolved diarrhea     Atrial fibrillation with RVR- resolved  --new onset, no prev history  --ECHO 70% EF  --Cardiology follows. Will need H& V follow up 1-2 weeks and Dr. Jimenez in 3-4 mos  --started on IV amiodarone and converted to NSR. Continued on IV due to N/V and transitioned to oral 12/11. Discontinued today with ongoing bradycardia.  -- low dose coreg started. xarelto started and renally dosed per cardiology    Leukocytosis  • Likely reactive, resolved      Hypertensive Urgency- resolved  • Blood pressure is not optimally controlled and will likely need further adjustments   • Added losartan 50 mg daily, cardura increased 2mg nightly today, coreg started bid today. Follow up with H&V for further adjustments     Mild RAFAT  • Likely secondary to dehydration  • Creatinine 1.3, cont fluids and now 0.9     Hypernatremia   --resolved with IVF change     Hypokalemia  Hypomagnesemia  • Replaced per protocol     DM type 1  • A1c 6.5%  • Resume insulin pump at DC       Discharge Follow Up Recommendations for outpatient labs/diagnostics:  PCP follow up 1-2 weeks  H&V clinic 1-2 weeks  Follow up with Dr. Jimenez 3-4 mos  GI, stimulator placement as scheduled    Day of Discharge     HPI:   Patient reports feeling much better and wants to go home. No abd pain. Nausea/ vomiting/ diarrhea resolved. Able to eat breakfast    Review of Systems  Gen- No fevers, chills  CV- No chest pain, palpitations  Resp- No cough, dyspnea  GI- No N/V/D, abd pain        Vital Signs:   Temp:  [98.2 °F (36.8 °C)-98.9 °F (37.2 °C)] 98.2 °F (36.8 °C)  Heart Rate:  [53-72] 71  Resp:  [18] 18  BP:  (162-210)/(75-87) 176/79      Physical Exam:  Constitutional: No acute distress, awake, alert  HENT: NCAT, mucous membranes moist  Respiratory: Clear to auscultation bilaterally, respiratory effort normal   Cardiovascular: RRR, no murmurs, rubs, or gallops  Gastrointestinal: Positive bowel sounds, soft, nontender, nondistended  Musculoskeletal: No bilateral ankle edema  Psychiatric: Appropriate affect, cooperative  Neurologic: Oriented x 3, strength symmetric in all extremities, Cranial Nerves grossly intact to confrontation, speech clear  Skin: No rashes      Pertinent  and/or Most Recent Results     LAB RESULTS:      Lab 12/10/22  0545 12/09/22  1151 12/07/22  1640 12/07/22  1324 12/07/22  0823 12/07/22  0507 12/07/22  0128   WBC 7.76 10.03  --  9.43  --   --  12.55*   HEMOGLOBIN 9.1* 10.7*  --  11.3*  --   --  13.3   HEMATOCRIT 26.3* 30.0*  --  32.9*  --   --  39.2   PLATELETS 258 284  --  330  --   --  385   NEUTROS ABS 6.08 7.99*  --  8.34*  --   --  11.52*   IMMATURE GRANS (ABS) 0.03 0.05  --  0.04  --   --  0.05   LYMPHS ABS 1.23 1.39  --  0.75  --   --  0.69*   MONOS ABS 0.42 0.59  --  0.29  --   --  0.26   EOS ABS 0.00 0.00  --  0.00  --   --  0.00   MCV 90.1 89.8  --  88.9  --   --  89.1   PROCALCITONIN  --   --   --   --   --   --  0.07   LACTATE  --   --  2.0 2.3* 5.6* 3.9*  --          Lab 12/12/22  0746 12/10/22  0545 12/09/22  2208 12/09/22  1151 12/07/22  1324 12/07/22  0128   SODIUM 143 146*  --  145 144 141   POTASSIUM 3.1* 3.7 3.8 3.2* 3.0* 3.3*   CHLORIDE 106 112*  --  109* 107 98   CO2 25.0 22.0  --  22.0 24.0 22.0   ANION GAP 12.0 12.0  --  14.0 13.0 21.0*   BUN 14 20  --  15 23 22   CREATININE 0.93 1.33*  --  0.99 1.17* 1.34*   EGFR 69.2 45.0*  --  64.2 52.5* 44.6*   GLUCOSE 119* 189*  --  169* 252* 350*   CALCIUM 8.8 8.5*  --  9.3 9.6 10.1   MAGNESIUM  --  3.0*  --  1.7  --  1.7   PHOSPHORUS  --   --   --  2.2*  --   --    HEMOGLOBIN A1C  --   --   --   --  6.50*  --          Lab  12/09/22  1151 12/07/22  0128   TOTAL PROTEIN 6.8 8.5   ALBUMIN 4.20 4.70   GLOBULIN 2.6 3.8   ALT (SGPT) 12 14   AST (SGOT) 13 18   BILIRUBIN 0.5 0.6   ALK PHOS 78 111   LIPASE  --  23         Lab 12/09/22  1151 12/07/22  0128   TROPONIN T <0.010 <0.010                 Lab 12/07/22  0357   FIO2 21   CARBOXYHEMOGLOBIN (VENOUS) 1.0     Brief Urine Lab Results  (Last result in the past 365 days)      Color   Clarity   Blood   Leuk Est   Nitrite   Protein   CREAT   Urine HCG        12/07/22 0400 Yellow   Clear   Negative   Negative   Negative   >=300 mg/dL (3+)               Microbiology Results (last 10 days)     Procedure Component Value - Date/Time    Gastrointestinal Panel, PCR - Stool, Per Rectum [[id number]]  (Abnormal) Collected: 12/10/22 0933    Lab Status: Final result Specimen: Stool from Per Rectum Updated: 12/10/22 1158     Campylobacter Not Detected     Plesiomonas shigelloides Not Detected     Salmonella Not Detected     Vibrio Not Detected     Vibrio cholerae Not Detected     Yersinia enterocolitica Not Detected     Enteroaggregative E. coli (EAEC) Detected     Enteropathogenic E. coli (EPEC) Not Detected     Enterotoxigenic E. coli (ETEC) lt/st Not Detected     Shiga-like toxin-producing E. coli (STEC) stx1/stx2 Not Detected     Shigella/Enteroinvasive E. coli (EIEC) Not Detected     Cryptosporidium Not Detected     Cyclospora cayetanensis Not Detected     Entamoeba histolytica Not Detected     Giardia lamblia Not Detected     Adenovirus F40/41 Not Detected     Astrovirus Not Detected     Norovirus GI/GII Not Detected     Rotavirus A Not Detected     Sapovirus (I, II, IV or V) Not Detected    COVID PRE-OP / PRE-PROCEDURE SCREENING ORDER (NO ISOLATION) - Swab, Nasopharynx [[id number]]  (Normal) Collected: 12/06/22 2333    Lab Status: Final result Specimen: Swab from Nasopharynx Updated: 12/07/22 0022    Narrative:      The following orders were created for panel order COVID PRE-OP / PRE-PROCEDURE SCREENING  ORDER (NO ISOLATION) - Swab, Nasopharynx.  Procedure                               Abnormality         Status                     ---------                               -----------         ------                     COVID-19 and FLU A/B PCR...[[id number]]  Normal              Final result                 Please view results for these tests on the individual orders.    COVID-19 and FLU A/B PCR - Swab, Nasopharynx [[id number]]  (Normal) Collected: 12/06/22 2333    Lab Status: Final result Specimen: Swab from Nasopharynx Updated: 12/07/22 0022     COVID19 Not Detected     Influenza A PCR Not Detected     Influenza B PCR Not Detected    Narrative:      Fact sheet for providers: https://www.fda.gov/media/438045/download    Fact sheet for patients: https://www.fda.gov/media/811827/download    Test performed by PCR.          Adult Transthoracic Echo Complete W/ Cont if Necessary Per Protocol    Result Date: 12/10/2022  •  Left ventricular ejection fraction appears to be greater than 70%. •  Left ventricular wall thickness is consistent with moderate concentric hypertrophy. •  Left ventricular diastolic function is consistent with (grade I) impaired relaxation. •  Estimated right ventricular systolic pressure from tricuspid regurgitation is mildly elevated (35-45 mmHg). Calculated right ventricular systolic pressure from tricuspid regurgitation is 39 mmHg.     XR Chest 1 View    Result Date: 12/7/2022  EXAMINATION: XR CHEST 1 VW DATE: 12/7/2022 4:36 AM INDICATION:  Leukocytosis, nausea and vomiting; COMPARISON:  September 20, 2022 FINDINGS: No focal consolidation, pleural effusion, or pneumothorax. Cardiomediastinal silhouette  unchanged. No acute osseous abnormality.     1.  No acute cardiopulmonary process. Electronically signed by:  Day Rivera M.D.  12/7/2022 3:35 AM Mountain Time              Results for orders placed during the hospital encounter of 12/06/22    Adult Transthoracic Echo Complete W/ Cont if Necessary  Per Protocol    Interpretation Summary  •  Left ventricular ejection fraction appears to be greater than 70%.  •  Left ventricular wall thickness is consistent with moderate concentric hypertrophy.  •  Left ventricular diastolic function is consistent with (grade I) impaired relaxation.  •  Estimated right ventricular systolic pressure from tricuspid regurgitation is mildly elevated (35-45 mmHg). Calculated right ventricular systolic pressure from tricuspid regurgitation is 39 mmHg.      Plan for Follow-up of Pending Labs/Results:     Discharge Details        Discharge Medications      New Medications      Instructions Start Date   carvedilol 6.25 MG tablet  Commonly known as: COREG   6.25 mg, Oral, 2 Times Daily With Meals      losartan 50 MG tablet  Commonly known as: COZAAR   50 mg, Oral, Every 24 Hours Scheduled   Start Date: December 13, 2022     megestrol 40 MG/ML suspension  Commonly known as: MEGACE   400 mg, Oral, Daily   Start Date: December 13, 2022     rivaroxaban 15 MG tablet  Commonly known as: XARELTO   15 mg, Oral, Daily With Dinner         Changes to Medications      Instructions Start Date   doxazosin 2 MG tablet  Commonly known as: CARDURA  What changed:   · medication strength  · how much to take   2 mg, Oral, Nightly      Gimoti 15 MG/ACT solution  Generic drug: Metoclopramide HCl  What changed:   · Another medication with the same name was changed. Make sure you understand how and when to take each.  · Another medication with the same name was removed. Continue taking this medication, and follow the directions you see here.   No dose, route, or frequency recorded.      metoclopramide 10 MG tablet  Commonly known as: REGLAN  What changed:   · medication strength  · how much to take  · when to take this  · Another medication with the same name was removed. Continue taking this medication, and follow the directions you see here.   10 mg, Oral, 4 Times Daily Before Meals & Nightly PRN          Continue These Medications      Instructions Start Date   acetaminophen 325 MG tablet  Commonly known as: TYLENOL   650 mg, Oral, Every 4 Hours PRN      albuterol sulfate  (90 Base) MCG/ACT inhaler  Commonly known as: PROVENTIL HFA;VENTOLIN HFA;PROAIR HFA   2 puffs, Inhalation, Every 4 Hours PRN      atorvastatin 80 MG tablet  Commonly known as: LIPITOR   80 mg, Oral, Nightly      BD Pen Needle Cydney U/F 32G X 4 MM misc  Generic drug: Insulin Pen Needle   1 each, Oral, 4 Times Daily      calcium carbonate 500 MG chewable tablet  Commonly known as: TUMS   2 tablets, Oral, 3 Times Daily PRN      Dexcom G6  device   1 kit, Does not apply, Every 3 Months      Dexcom G6 Sensor   Does not apply, Every 10 Days      Dexcom G6 Transmitter misc   1 kit, Does not apply, Every 3 Months      diphenhydrAMINE 25 MG tablet  Commonly known as: BENADRYL   25 mg, Oral, Every 6 Hours PRN      donepezil 5 MG tablet  Commonly known as: Aricept   5 mg, Oral, Nightly      estradiol 0.1 MG/GM vaginal cream  Commonly known as: ESTRACE VAGINAL   Insert 1 gm intravaginally twice weekly at bedtime.      ferrous sulfate 325 (65 Fe) MG tablet   TAKE 1 TABLET BY MOUTH ONCE DAILY WITH BREAKFAST. TAKE WITH ORANGE JUICE OR VITAMIN C      FLUoxetine 20 MG capsule  Commonly known as: PROzac   20 mg, Oral, Daily      FreeStyle Lite w/Device kit   USE UNIT TO CHECK GLUCOSE 4 TIMES DAILY      gabapentin 400 MG capsule  Commonly known as: NEURONTIN   400 mg, Oral, 2 Times Daily PRN      glucose blood test strip  Commonly known as: Glucose Meter Test   Use as instructed to check blood glucose levels 3 times daily      glucose blood test strip   Use as instructed      glucose monitor monitoring kit   1 each, Does not apply, 4 Times Daily      melatonin 5 MG tablet tablet   5 mg, Oral, Nightly PRN      mirtazapine 15 MG tablet  Commonly known as: REMERON   TAKE 1 TABLET BY MOUTH ONCE DAILY AT NIGHT      Multivitamin tablet tablet  Generic  drug: multivitamin   1 tablet, Oral, Daily      Multivitamin-Minerals tablet   1 tablet, Oral, Daily      pantoprazole 40 MG EC tablet  Commonly known as: PROTONIX   Take 1 tablet by mouth once daily      sennosides-docusate 8.6-50 MG per tablet  Commonly known as: PERICOLACE   2 tablets, Oral, 2 Times Daily PRN      traMADol 50 MG tablet  Commonly known as: ULTRAM   50 mg, Oral, Every 6 Hours PRN      traZODone 50 MG tablet  Commonly known as: DESYREL   TAKE 1 TO 2 TABLETS BY MOUTH AT BEDTIME AS NEEDED FOR SLEEP      VITAMIN C PO   Vitamin C TABS      vitamin D 1.25 MG (58124 UT) capsule capsule  Commonly known as: ERGOCALCIFEROL   Take 1 capsule by mouth once a week         Stop These Medications    amLODIPine 10 MG tablet  Commonly known as: NORVASC     aspirin 81 MG EC tablet            No Known Allergies      Discharge Disposition:  Home or Self Care    Diet:  Hospital:  Diet Order   Procedures   • Diet: Gastrointestinal Diets; Fiber-Restricted; Texture: Regular Texture (IDDSI 7); Fluid Consistency: Thin (IDDSI 0)       Activity:  Activity Instructions     Activity as Tolerated            Restrictions or Other Recommendations:         CODE STATUS:    Code Status and Medical Interventions:   Ordered at: 12/07/22 0501     Code Status (Patient has no pulse and is not breathing):    CPR (Attempt to Resuscitate)     Medical Interventions (Patient has pulse or is breathing):    Full Support       Future Appointments   Date Time Provider Department Center   3/27/2023 11:15 AM Clara Rojas APRN [facility]       Additional Instructions for the Follow-ups that You Need to Schedule     Ambulatory Referral to [facility]   As directed      1-2 week hospital follow up    Service Requested: Arrhythmia Clinic         Discharge Follow-up with PCP   As directed       Currently Documented PCP:    Monet oHwe APRN    PCP Phone Number:    [phone]     Follow Up Details: 1  week         Discharge Follow-up with Specified Provider: Dr. Mitchell; 3 Months   As directed      To: Dr. Mitchell    Follow Up: 3 Months         Discharge Follow-up with Specified Provider: GI as scheduled   As directed      To: GI as scheduled         Discharge Follow-up with Specified Provider: heart and valve clinic 1-2 weeks; 1 Week   As directed      To: heart and valve clinic 1-2 weeks    Follow Up: 1 Week    Follow Up Details: bp/ HR check                     HOWIE Giang  12/12/22      Spent 55 minutes on this discharge activity which included: face-to-face encounter with the patient, reviewing the data in the system, coordination of the care with the nursing staff as well as consultants, documentation, and entering orders.        Electronically signed by HOWIE Giang, 12/12/22, 11:53 AM EST.

## 2022-12-12 NOTE — PROGRESS NOTES
[facility] Cardiology    Inpatient Progress Note      Chief Complaint/Reason for service:    · Atrial flutter         Subjective:       Able to tolerate more orally.  Denies palpitations, chest pain    Past medical, surgical, social and family history reviewed in the patient's electronic medical record.    Review of Systems:   As noted in the subjective section    Problem List  Active Hospital Problems    Diagnosis  POA   • **Gastroparesis [K31.84]  Yes   • PFO (patent foramen ovale) [Q21.12]  Not Applicable   • Atrial fibrillation and flutter (HCC) [I48.91, I48.92]  Yes   • Elevated serum creatinine [R79.89]  Yes   • Leukocytosis [D72.829]  Yes   • Hypokalemia [E87.6]  Yes   • History of TIAs [Z86.73]  Not Applicable   • Uncontrolled type 1 diabetes mellitus with hyperglycemia (HCC) [E10.65]  Yes   • Nausea & vomiting [R11.2]  Yes   • Hypertensive urgency [I16.0]  Yes   • Heart valve disease [I38]  Yes   • Tobacco use [Z72.0]  Yes   • Hyperlipidemia [E78.5]  Yes   • Hypertension [I10]  Yes      Resolved Hospital Problems   No resolved problems to display.            Objective:      Infusions:        Medications:    Current Facility-Administered Medications:   •  acetaminophen (TYLENOL) tablet 650 mg, 650 mg, Oral, Q4H PRN **OR** acetaminophen (TYLENOL) 160 MG/5ML solution 650 mg, 650 mg, Oral, Q4H PRN **OR** acetaminophen (TYLENOL) suppository 650 mg, 650 mg, Rectal, Q4H PRN, Eli Zavala APRN  •  amiodarone (PACERONE) tablet 200 mg, 200 mg, Oral, Q12H, Dylan Rowley PA-C, 200 mg at 12/11/22 2017  •  aspirin EC tablet 81 mg, 81 mg, Oral, Daily, Eli Zavaal APRN, 81 mg at 12/11/22 0814  •  atorvastatin (LIPITOR) tablet 80 mg, 80 mg, Oral, Nightly, Eli Zavala APRN, 80 mg at 12/11/22 2008  •  sennosides-docusate (PERICOLACE) 8.6-50 MG per tablet 2 tablet, 2 tablet, Oral, BID, 2 tablet at 12/07/22 2150 **AND** polyethylene glycol (MIRALAX) packet 17 g, 17 g, Oral, Daily PRN  **AND** bisacodyl (DULCOLAX) EC tablet 5 mg, 5 mg, Oral, Daily PRN **AND** bisacodyl (DULCOLAX) suppository 10 mg, 10 mg, Rectal, Daily PRN, Eli Zavala, APRN  •  cloNIDine (CATAPRES) tablet 0.1 mg, 0.1 mg, Oral, Q8H PRN, Shirin Swenson MD, 0.1 mg at 12/11/22 2050  •  dextrose (D50W) (25 g/50 mL) IV injection 25 g, 25 g, Intravenous, Q15 Min PRN, Eli Zavala, APRN  •  dextrose (GLUTOSE) oral gel 15 g, 15 g, Oral, Q15 Min PRN, Eli Zavala APRN  •  donepezil (ARICEPT) tablet 5 mg, 5 mg, Oral, Nightly, Eli Zavala, APRN, 5 mg at 12/11/22 2008  •  Enoxaparin Sodium (LOVENOX) syringe 60 mg, 1 mg/kg, Subcutaneous, Q12H, Zana Torres PA, 60 mg at 12/12/22 0514  •  FLUoxetine (PROzac) capsule 20 mg, 20 mg, Oral, Daily, Eli Zavala APRN, 20 mg at 12/11/22 0814  •  glucagon (human recombinant) (GLUCAGEN DIAGNOSTIC) injection 1 mg, 1 mg, Intramuscular, Q15 Min PRN, Eli Zavala, APRN  •  influenza vac split quad (FLUZONE,FLUARIX,AFLURIA,FLULAVAL) injection 0.5 mL, 0.5 mL, Intramuscular, During Hospitalization, Kavon Cramer DO  •  Insulin Lispro (humaLOG) injection 0-7 Units, 0-7 Units, Subcutaneous, TID AC, Eli Zavala APRN, 2 Units at 12/11/22 0811  •  losartan (COZAAR) tablet 50 mg, 50 mg, Oral, Q24H, Shirin Swenson MD, 50 mg at 12/11/22 0814  •  megestrol (MEGACE) 40 MG/ML suspension 400 mg, 400 mg, Oral, Daily, Shirin Swenson MD, 400 mg at 12/11/22 0815  •  melatonin tablet 5 mg, 5 mg, Oral, Nightly PRN, Eli Zavala, HOWIE, 5 mg at 12/10/22 9290  •  metoclopramide (REGLAN) tablet 10 mg, 10 mg, Oral, 4x Daily AC & at Bedtime, Susan Cohen APRN, 10 mg at 12/11/22 2008  •  metoprolol tartrate (LOPRESSOR) tablet 25 mg, 25 mg, Oral, Q12H, Shirin Swenson MD, 25 mg at 12/11/22 2010  •  mirtazapine (REMERON) tablet 15 mg, 15 mg, Oral, Nightly, Eli Zavala APRN, 15 mg at 12/11/22 2008  •  pantoprazole (PROTONIX) EC tablet 40 mg, 40  mg, Oral, Daily, Eli Zavala, APRN, 40 mg at 12/11/22 0814  •  potassium chloride (MICRO-K) CR capsule 40 mEq, 40 mEq, Oral, PRN, 40 mEq at 12/09/22 1724 **OR** potassium chloride (KLOR-CON) packet 40 mEq, 40 mEq, Oral, PRN **OR** potassium chloride 10 mEq in 100 mL IVPB, 10 mEq, Intravenous, Q1H PRN, Eli Zavala APRN  •  promethazine (PHENERGAN) 12.5 mg in sodium chloride 0.9 % 50 mL, 12.5 mg, Intravenous, Q4H PRN, Shirin Swenson MD, 12.5 mg at 12/08/22 1323  •  sodium chloride 0.9 % flush 10 mL, 10 mL, Intravenous, Q12H, Eli Zavala APRN, 10 mL at 12/10/22 2016  •  sodium chloride 0.9 % flush 10 mL, 10 mL, Intravenous, PRN, Eli Zavala, APRN  •  sodium chloride 0.9 % infusion 40 mL, 40 mL, Intravenous, PRN, Eli Zavala, APRN  •  terazosin (HYTRIN) capsule 1 mg, 1 mg, Oral, Nightly, Eli Zavala APRN, 1 mg at 12/11/22 2009  •  traZODone (DESYREL) tablet 50 mg, 50 mg, Oral, Nightly PRN, Kavon Cramer, DO, 50 mg at 12/10/22 1968    Vital Sign Min/Max for last 24 hours  Temp  Min: 98.4 °F (36.9 °C)  Max: 98.9 °F (37.2 °C)   BP  Min: 162/78  Max: 210/82   Pulse  Min: 53  Max: 72   Resp  Min: 18  Max: 18   SpO2  Min: 99 %  Max: 100 %   No data recorded    No intake or output data in the 24 hours ending 12/12/22 0742        CONSTITUTIONAL: No acute distress    Labs/studies:  Available lab and imaging results were reviewed by myself today    Estimated Creatinine Clearance: 44.1 mL/min (A) (by C-G formula based on SCr of 1.33 mg/dL (H)).    Results for orders placed during the hospital encounter of 12/06/22    Adult Transthoracic Echo Complete W/ Cont if Necessary Per Protocol    Interpretation Summary  •  Left ventricular ejection fraction appears to be greater than 70%.  •  Left ventricular wall thickness is consistent with moderate concentric hypertrophy.  •  Left ventricular diastolic function is consistent with (grade I) impaired relaxation.  •  Estimated right  ventricular systolic pressure from tricuspid regurgitation is mildly elevated (35-45 mmHg). Calculated right ventricular systolic pressure from tricuspid regurgitation is 39 mmHg.      Tele: Sinus rhythm.  1 brief run of SVT.  PVCs present.           Assessment/Plan:       ASSESSMENT:  1. Atrial flutter  2. PVCs  3. Gastroparesis  4. Diabetes  5. Hypertension  6. Dyslipidemia  7. Tobacco dependence  8. History of TIA    PLAN:  1. Discontinuing amiodarone  2. Switch metoprolol to carvedilol both for A. fib and HTN  3. Ability to uptitrate carvedilol may be limited by heart rate.  Recommend maintaining an average heart rate of greater than 50 bpm.  May need additional antihypertensives.  4. Starting Xarelto, 15mg daily, renally dosed.  Discussed the importance of stroke prophylaxis in the setting of her prior TIA, atrial flutter  5. Upon discharge, follow-up with heart and valve in 2-3 weeks to reassess rhythm and blood pressure, follow-up with Dr. Mitchell in 3-4 months  6. Cardiology will see on an as-needed basis.  Please feel free to call with any questions or concerns.      Ludwig Mitchell MD, MSc, FACC, [facility]  Interventional Cardiology  [facility]

## 2022-12-13 ENCOUNTER — TRANSITIONAL CARE MANAGEMENT TELEPHONE ENCOUNTER (OUTPATIENT)
Dept: CALL CENTER | Facility: HOSPITAL | Age: 62
End: 2022-12-13

## 2022-12-13 NOTE — OUTREACH NOTE
Call Center TCM Note    Flowsheet Row Responses   [facility] patient discharged from? [place]   Does the patient have one of the following disease processes/diagnoses(primary or secondary)? Other   TCM attempt successful? Yes   Discharge diagnosis Gastroparesis Atrial fibrillation and flutter Elevated serum creatinine    Meds reviewed with patient/caregiver? Yes   Is the patient having any side effects they believe may be caused by any medication additions or changes? No   Does the patient have all medications ordered at discharge? Yes   Is the patient taking all medications as directed (includes completed medication regime)? Yes   Does the patient have an appointment with their PCP within 7 days of discharge? Yes   Has home health visited the patient within 72 hours of discharge? N/A   Psychosocial issues? No   Did the patient receive a copy of their discharge instructions? Yes   Nursing interventions Reviewed instructions with patient   What is the patient's perception of their health status since discharge? Improving   Is the patient/caregiver able to teach back signs and symptoms related to disease process for when to call PCP? Yes   Is the patient/caregiver able to teach back signs and symptoms related to disease process for when to call 911? Yes   Is the patient/caregiver able to teach back the hierarchy of who to call/visit for symptoms/problems? PCP, Specialist, Home health nurse, Urgent Care, ED, 911 Yes   If the patient is a current smoker, are they able to teach back resources for cessation? Not a smoker   TCM call completed? Yes   Wrap up additional comments Gastroparesis Atrial fibrillation and flutter Elevated serum creatinine - pt feeling very well, all new medications in place. No questions. TCM APPT with PCP is 12/20/2022, CARDIO MD follow up is 12/23/2022          Lisy Nassar MA    12/13/2022, 16:11 EST

## 2022-12-13 NOTE — OUTREACH NOTE
Call Center TCM Note    Flowsheet Row Responses   [facility] patient discharged from? [place]   Does the patient have one of the following disease processes/diagnoses(primary or secondary)? Other   TCM attempt successful? No   Unsuccessful attempts Attempt 1   Call Status Left message          Lisy Nassar MA    12/13/2022, 09:18 EST

## 2022-12-21 ENCOUNTER — READMISSION MANAGEMENT (OUTPATIENT)
Dept: CALL CENTER | Facility: HOSPITAL | Age: 62
End: 2022-12-21

## 2022-12-21 NOTE — OUTREACH NOTE
Medical Week 2 Survey    Flowsheet Row Responses   [facility] patient discharged from? [place]   Does the patient have one of the following disease processes/diagnoses(primary or secondary)? Other   Week 2 attempt successful? Yes   Call start time 1446   Discharge diagnosis Gastroparesis Atrial fibrillation and flutter Elevated serum creatinine    Call end time 1649   Meds reviewed with patient/caregiver? Yes   Is the patient having any side effects they believe may be caused by any medication additions or changes? No   Does the patient have all medications ordered at discharge? Yes   Is the patient taking all medications as directed (includes completed medication regime)? Yes   Does the patient have a primary care provider?  Yes   Comments regarding PCP PATIENT MISSED HER PCP APPOINTMENT YESTERDAY.    Has the patient kept scheduled appointments due by today? Yes   Has home health visited the patient within 72 hours of discharge? N/A   Psychosocial issues? No   Did the patient receive a copy of their discharge instructions? Yes   Nursing interventions Reviewed instructions with patient   What is the patient's perception of their health status since discharge? Improving   Is the patient/caregiver able to teach back signs and symptoms related to disease process for when to call PCP? Yes   Is the patient/caregiver able to teach back signs and symptoms related to disease process for when to call 911? Yes   Is the patient/caregiver able to teach back the hierarchy of who to call/visit for symptoms/problems? PCP, Specialist, Home health nurse, Urgent Care, ED, 911 Yes   If the patient is a current smoker, are they able to teach back resources for cessation? Not a smoker   Week 2 Call Completed? Yes          KELLIE CASTELAN - Licensed Nurse

## 2022-12-23 ENCOUNTER — TELEMEDICINE (OUTPATIENT)
Dept: CARDIOLOGY | Facility: HOSPITAL | Age: 62
End: 2022-12-23

## 2022-12-28 RX ORDER — ERGOCALCIFEROL 1.25 MG/1
CAPSULE ORAL
Qty: 4 CAPSULE | Refills: 0 | Status: SHIPPED | OUTPATIENT
Start: 2022-12-28 | End: 2023-01-05

## 2022-12-29 ENCOUNTER — READMISSION MANAGEMENT (OUTPATIENT)
Dept: CALL CENTER | Facility: HOSPITAL | Age: 62
End: 2022-12-29

## 2022-12-29 NOTE — OUTREACH NOTE
Medical Week 3 Survey    Flowsheet Row Responses   [facility] patient discharged from? [place]   Does the patient have one of the following disease processes/diagnoses(primary or secondary)? Other   Week 3 attempt successful? No   Call start time 1545   Unsuccessful attempts Attempt 2  [Pt is driving at time of call]   Discharge diagnosis Gastroparesis Atrial fibrillation and flutter Elevated serum creatinine    Person spoke with today (if not patient) and relationship william GARCIA - Registered Nurse

## 2022-12-30 ENCOUNTER — TELEPHONE (OUTPATIENT)
Dept: ENDOCRINOLOGY | Facility: CLINIC | Age: 62
End: 2022-12-30

## 2022-12-30 DIAGNOSIS — E10.65 UNCONTROLLED TYPE 1 DIABETES MELLITUS WITH HYPERGLYCEMIA: ICD-10-CM

## 2022-12-30 RX ORDER — PROCHLORPERAZINE 25 MG/1
SUPPOSITORY RECTAL
Qty: 1 EACH | Refills: 0 | Status: SHIPPED | OUTPATIENT
Start: 2022-12-30 | End: 2022-12-30 | Stop reason: SDUPTHER

## 2022-12-30 RX ORDER — PROCHLORPERAZINE 25 MG/1
1 SUPPOSITORY RECTAL
Qty: 1 EACH | Refills: 3 | Status: SHIPPED | OUTPATIENT
Start: 2022-12-30

## 2022-12-30 NOTE — TELEPHONE ENCOUNTER
Patient called stating they lost their transmitter and need a new one. Patient requested a call back.

## 2023-01-05 ENCOUNTER — OFFICE VISIT (OUTPATIENT)
Dept: INTERNAL MEDICINE | Facility: CLINIC | Age: 63
End: 2023-01-05
Payer: COMMERCIAL

## 2023-01-05 VITALS
WEIGHT: 158 LBS | HEART RATE: 70 BPM | OXYGEN SATURATION: 99 % | TEMPERATURE: 97.2 F | BODY MASS INDEX: 23.95 KG/M2 | DIASTOLIC BLOOD PRESSURE: 89 MMHG | HEIGHT: 68 IN | SYSTOLIC BLOOD PRESSURE: 180 MMHG

## 2023-01-05 DIAGNOSIS — Z09 HOSPITAL DISCHARGE FOLLOW-UP: Primary | ICD-10-CM

## 2023-01-05 DIAGNOSIS — R53.1 GENERALIZED WEAKNESS: ICD-10-CM

## 2023-01-05 DIAGNOSIS — M19.90 OSTEOARTHRITIS, UNSPECIFIED OSTEOARTHRITIS TYPE, UNSPECIFIED SITE: ICD-10-CM

## 2023-01-05 DIAGNOSIS — R06.02 SHORTNESS OF BREATH: ICD-10-CM

## 2023-01-05 DIAGNOSIS — I10 ESSENTIAL HYPERTENSION: ICD-10-CM

## 2023-01-05 DIAGNOSIS — K31.84 GASTROPARESIS: ICD-10-CM

## 2023-01-05 DIAGNOSIS — R11.0 CHRONIC NAUSEA: ICD-10-CM

## 2023-01-05 DIAGNOSIS — F51.01 PRIMARY INSOMNIA: ICD-10-CM

## 2023-01-05 PROCEDURE — 99214 OFFICE O/P EST MOD 30 MIN: CPT | Performed by: NURSE PRACTITIONER

## 2023-01-05 RX ORDER — ALBUTEROL SULFATE 90 UG/1
2 AEROSOL, METERED RESPIRATORY (INHALATION) EVERY 4 HOURS PRN
Qty: 18 G | Refills: 5 | Status: SHIPPED | OUTPATIENT
Start: 2023-01-05

## 2023-01-05 RX ORDER — TRAZODONE HYDROCHLORIDE 50 MG/1
50-100 TABLET ORAL NIGHTLY PRN
Qty: 45 TABLET | Refills: 3 | Status: SHIPPED | OUTPATIENT
Start: 2023-01-05 | End: 2023-03-16

## 2023-01-05 RX ORDER — ERGOCALCIFEROL 1.25 MG/1
CAPSULE ORAL
Qty: 4 CAPSULE | Refills: 0 | Status: SHIPPED | OUTPATIENT
Start: 2023-01-05 | End: 2023-01-20 | Stop reason: SDUPTHER

## 2023-01-05 RX ORDER — TRAMADOL HYDROCHLORIDE 50 MG/1
50 TABLET ORAL EVERY 6 HOURS PRN
Qty: 28 TABLET | Refills: 2 | Status: SHIPPED | OUTPATIENT
Start: 2023-01-05 | End: 2023-03-16 | Stop reason: SDUPTHER

## 2023-01-05 NOTE — PROGRESS NOTES
Chief Complaint   Patient presents with   • Hospital Follow Up Visit       HPI  Thelma Michael is a 62 y.o. female presents for a hospital follow-up.  Pt was admitted to [facility] 12/6-12/12/22 after presenting to the ED with intractable N/V due to diabetic gastroparesis.   She has a temporary stimulator in her stomach for her gastroparesis.  She is awaiting to be scheduled for surgery for permanent placement of a stimulator because the temporary one has been helping her.  Currently nauseated because she didn't have time to take her Reglan this morning.  Has not had her BP medication yet either.  She states that she has been having an appetite and has been eating.  Her BP was uncontrolled in the hospital so she was started on Losartan.  She states she has been checking her BP at home and her BP has been \"good\".     The following portions of the patient's history were reviewed and updated as appropriate: allergies, current medications, past family history, past medical history, past social history, past surgical history and problem list.    Subjective  Review of Systems   Constitutional: Negative for activity change, appetite change and fatigue.   HENT: Negative for congestion.    Respiratory: Negative for cough and shortness of breath.    Cardiovascular: Negative for chest pain and leg swelling.   Gastrointestinal: Positive for abdominal pain and nausea.   Neurological: Negative for dizziness, weakness and confusion.   Psychiatric/Behavioral: Negative for behavioral problems and decreased concentration.        Objective   Visit Vitals  /89 (BP Location: Left arm, Patient Position: Sitting)   Pulse 70   Temp 97.2 °F (36.2 °C)   Ht 172.7 cm (68\")   Wt 71.7 kg (158 lb)   SpO2 99%   BMI 24.02 kg/m²        Physical Exam  Vitals and nursing note reviewed.   HENT:      Head: Normocephalic.   Eyes:      Pupils: Pupils are equal, round, and reactive to light.   Cardiovascular:      Rate and Rhythm: Normal rate and  regular rhythm.      Pulses: Normal pulses.      Heart sounds: Normal heart sounds.   Pulmonary:      Effort: Pulmonary effort is normal.      Breath sounds: Normal breath sounds.   Skin:     General: Skin is warm and dry.      Capillary Refill: Capillary refill takes less than 2 seconds.   Neurological:      General: No focal deficit present.      Mental Status: She is alert and oriented to person, place, and time.      Gait: Gait is intact.   Psychiatric:         Attention and Perception: Attention normal.         Mood and Affect: Mood normal.         Behavior: Behavior normal.          Procedures     Assessment and Plan  Diagnoses and all orders for this visit:    1. Hospital discharge follow-up (Primary)    2. Gastroparesis    3. Essential hypertension    4. Osteoarthritis, unspecified osteoarthritis type, unspecified site  -     traMADol (ULTRAM) 50 MG tablet; Take 1 tablet by mouth Every 6 (Six) Hours As Needed for Moderate Pain.  Dispense: 28 tablet; Refill: 2    5. Primary insomnia  -     traZODone (DESYREL) 50 MG tablet; Take 1-2 tablets by mouth At Night As Needed for Sleep. for sleep  Dispense: 45 tablet; Refill: 3    6. Shortness of breath  -     albuterol sulfate  (90 Base) MCG/ACT inhaler; Inhale 2 puffs Every 4 (Four) Hours As Needed for Wheezing.  Dispense: 18 g; Refill: 5    7. Chronic nausea    8. Generalized weakness      D/c summary reviewed  Discharge >14 days ago  BP high - hasn't taken any of meds this mornig due to running late, pt agrees to take as soon as she gets home  Has Reglan at home for nausea  GI stimulator placement being scheduled  Order for 4 prong cane per pt request  Handicap tag (temporary)      HOWIE Delarosa

## 2023-01-11 RX ORDER — PNV NO.95/FERROUS FUM/FOLIC AC 28MG-0.8MG
TABLET ORAL
Qty: 30 TABLET | Refills: 0 | Status: SHIPPED | OUTPATIENT
Start: 2023-01-11 | End: 2023-02-10

## 2023-01-12 ENCOUNTER — TELEMEDICINE (OUTPATIENT)
Dept: CARDIOLOGY | Facility: HOSPITAL | Age: 63
End: 2023-01-12
Payer: COMMERCIAL

## 2023-01-12 DIAGNOSIS — I48.91 ATRIAL FIBRILLATION AND FLUTTER: Primary | ICD-10-CM

## 2023-01-12 DIAGNOSIS — I48.92 ATRIAL FIBRILLATION AND FLUTTER: Primary | ICD-10-CM

## 2023-01-12 DIAGNOSIS — E78.2 MIXED HYPERLIPIDEMIA: ICD-10-CM

## 2023-01-12 DIAGNOSIS — K31.84 GASTROPARESIS: ICD-10-CM

## 2023-01-12 PROCEDURE — 99442 PR PHYS/QHP TELEPHONE EVALUATION 11-20 MIN: CPT | Performed by: NURSE PRACTITIONER

## 2023-01-16 VITALS
DIASTOLIC BLOOD PRESSURE: 82 MMHG | WEIGHT: 158 LBS | HEIGHT: 68 IN | SYSTOLIC BLOOD PRESSURE: 144 MMHG | BODY MASS INDEX: 23.95 KG/M2

## 2023-01-16 DIAGNOSIS — M19.90 OSTEOARTHRITIS, UNSPECIFIED OSTEOARTHRITIS TYPE, UNSPECIFIED SITE: ICD-10-CM

## 2023-01-16 RX ORDER — TRAMADOL HYDROCHLORIDE 50 MG/1
TABLET ORAL
Qty: 28 TABLET | Refills: 0 | OUTPATIENT
Start: 2023-01-16

## 2023-01-16 NOTE — PROGRESS NOTES
"Chief Complaint  Follow-up (Afib , needs cardiac clearance )    Subjective    History of Present Illness {CC  Problem List  Visit  Diagnosis   Encounters  Notes  Medications  Labs  Result Review Imaging  Media :23}       History of Present Illness   62 year old female with telephone visit today for ongoing evaluation of her afib and for cardiac clearance for implantation of permanent gastric stimulator. Recently hospitalized at [facility] December 2022 with intractable N/V due to diabetic gastroparesis. Notes that she is no longer vomiting but still experiencing nausea. She is anticoagulated with xarelto and denies any s/s of bleeding. Also denies palpitations or afib episodes  Objective     Vital Signs:   Vitals:    01/12/23 1045   BP: 144/82   BP Location: Left arm   Patient Position: Sitting   Weight: 71.7 kg (158 lb)   Height: 172.7 cm (68\")     Body mass index is 24.02 kg/m².  Physical Exam  Vitals and nursing note reviewed.   Constitutional:       Appearance: Normal appearance.   Pulmonary:      Effort: Pulmonary effort is normal.   Neurological:      Mental Status: She is alert and oriented to person, place, and time.   Psychiatric:         Mood and Affect: Mood normal.         Behavior: Behavior normal.         Thought Content: Thought content normal.              Result Review  Data Reviewed:{ Labs  Result Review  Imaging  Med Tab  Media :23}     Lab Results   Component Value Date    GLUCOSE 119 (H) 12/12/2022    CALCIUM 8.8 12/12/2022     12/12/2022    K 3.1 (L) 12/12/2022    CO2 25.0 12/12/2022     12/12/2022    BUN 14 12/12/2022    CREATININE 0.93 12/12/2022    EGFRIFAFRI 62 02/13/2022    EGFRIFNONA 61 01/06/2015    BCR 15.1 12/12/2022    ANIONGAP 12.0 12/12/2022                Assessment and Plan {CC Problem List  Visit Diagnosis  ROS  Review (Popup)  Health Maintenance  Quality  BestPractice  Medications  SmartSets  SnapShot Encounters  Media :23}   1. Atrial fibrillation " and flutter (HCC)  CHADS-VASc Risk Assessment            6 Total Score    1 Hypertension    1 DM    2 PRIOR STROKE/TIA/THROMBO    1 Age 65-74    1 Sex: Female        Criteria that do not apply:    CHF    Age >/= 75    Vascular Disease        Continue xarelto     2. Mixed hyperlipidemia    Stable on lipitor     3. Gastroparesis  Plan for permanent gastric stimulator  Patient is cleared to proceed with implantation of gastric stimulator  Hold xarelto for 48 hours prior to procedure and resume the day after the procedure     Telephone time: 12 minutes   Follow Up {Instructions Charge Capture  Follow-up Communications :23}   Return if symptoms worsen or fail to improve.    Patient was given instructions and counseling regarding her condition or for health maintenance advice. Please see specific information pulled into the AVS if appropriate.  Patient was instructed to call the [facility] with any questions, concerns, or worsening symptoms.

## 2023-01-20 DIAGNOSIS — M19.90 OSTEOARTHRITIS, UNSPECIFIED OSTEOARTHRITIS TYPE, UNSPECIFIED SITE: ICD-10-CM

## 2023-01-20 DIAGNOSIS — K21.9 GASTROESOPHAGEAL REFLUX DISEASE: ICD-10-CM

## 2023-01-20 RX ORDER — LOSARTAN POTASSIUM 50 MG/1
50 TABLET ORAL
Qty: 30 TABLET | Refills: 0 | Status: SHIPPED | OUTPATIENT
Start: 2023-01-20 | End: 2023-02-06 | Stop reason: HOSPADM

## 2023-01-20 RX ORDER — ERGOCALCIFEROL 1.25 MG/1
50000 CAPSULE ORAL WEEKLY
Qty: 4 CAPSULE | Refills: 0 | Status: SHIPPED | OUTPATIENT
Start: 2023-01-20 | End: 2023-03-20 | Stop reason: SDUPTHER

## 2023-01-20 RX ORDER — PANTOPRAZOLE SODIUM 40 MG/1
40 TABLET, DELAYED RELEASE ORAL DAILY
Qty: 30 TABLET | Refills: 5 | Status: SHIPPED | OUTPATIENT
Start: 2023-01-20 | End: 2023-02-03 | Stop reason: SDUPTHER

## 2023-01-20 NOTE — TELEPHONE ENCOUNTER
Caller: Thelma Michael    Relationship: Self    Best call back number: [phone]    Requested Prescriptions:   Requested Prescriptions     Pending Prescriptions Disp Refills   • traMADol (ULTRAM) 50 MG tablet 28 tablet 2     Sig: Take 1 tablet by mouth Every 6 (Six) Hours As Needed for Moderate Pain.        Pharmacy where request should be sent: [id number] [place] [id number] - [phone]  - [phone] FX     Additional details provided by patient: PATIENT HAS 2 DAYS     Does the patient have less than a 3 day supply:  [x] Yes  [] No    Would you like a call back once the refill request has been completed: [x] Yes [] No    If the office needs to give you a call back, can they leave a voicemail: [x] Yes [] No    Cadance Dunaway, RegSched Rep   01/20/23 11:50 EST

## 2023-01-20 NOTE — TELEPHONE ENCOUNTER
Caller: MichaelThelma    Relationship: Self    Best call back number: [phone]    Requested Prescriptions:   Requested Prescriptions     Pending Prescriptions Disp Refills   • pantoprazole (PROTONIX) 40 MG EC tablet 30 tablet 5     Sig: Take 1 tablet by mouth Daily.   • losartan (COZAAR) 50 MG tablet 30 tablet 0     Sig: Take 1 tablet by mouth Daily.   • vitamin D (ERGOCALCIFEROL) 1.25 MG (13310 UT) capsule capsule 4 capsule 0     Sig: Take 1 capsule by mouth 1 (One) Time Per Week.    A SLEEPING MEDICATION PATIENT DOES NOT KNOW THE NAME OF     Pharmacy where request should be sent: [facility] [address] [place] [address] [phone] [facility] [phone] FX     Additional details provided by patient: PATIENT HAS 2 DAYS     Does the patient have less than a 3 day supply:  [x] Yes  [] No    Would you like a call back once the refill request has been completed: [x] Yes [] No    If the office needs to give you a call back, can they leave a voicemail: [x] Yes [] No    Cadance Dunaway, RegSched Rep   01/20/23 11:48 EST

## 2023-01-22 RX ORDER — TRAMADOL HYDROCHLORIDE 50 MG/1
50 TABLET ORAL EVERY 6 HOURS PRN
Qty: 28 TABLET | Refills: 0 | OUTPATIENT
Start: 2023-01-22

## 2023-02-03 ENCOUNTER — HOSPITAL ENCOUNTER (OUTPATIENT)
Facility: HOSPITAL | Age: 63
Setting detail: OBSERVATION
Discharge: HOME HEALTH | End: 2023-02-06
Attending: EMERGENCY MEDICINE | Admitting: INTERNAL MEDICINE
Payer: COMMERCIAL

## 2023-02-03 DIAGNOSIS — K21.9 GASTROESOPHAGEAL REFLUX DISEASE: ICD-10-CM

## 2023-02-03 DIAGNOSIS — E86.9 VOLUME DEPLETION: Primary | ICD-10-CM

## 2023-02-03 DIAGNOSIS — D50.8 IRON DEFICIENCY ANEMIA SECONDARY TO INADEQUATE DIETARY IRON INTAKE: ICD-10-CM

## 2023-02-03 DIAGNOSIS — N17.9 ACUTE RENAL FAILURE, UNSPECIFIED ACUTE RENAL FAILURE TYPE: ICD-10-CM

## 2023-02-03 DIAGNOSIS — R53.1 WEAKNESS: ICD-10-CM

## 2023-02-03 DIAGNOSIS — R10.9 ACUTE ABDOMINAL PAIN: ICD-10-CM

## 2023-02-03 DIAGNOSIS — K31.84 GASTROPARESIS: ICD-10-CM

## 2023-02-03 DIAGNOSIS — Z86.73 HISTORY OF TIAS: ICD-10-CM

## 2023-02-03 LAB
ALBUMIN SERPL-MCNC: 4.7 G/DL (ref 3.5–5.2)
ALBUMIN/GLOB SERPL: 1.3 G/DL
ALP SERPL-CCNC: 95 U/L (ref 39–117)
ALT SERPL W P-5'-P-CCNC: 14 U/L (ref 1–33)
ANION GAP SERPL CALCULATED.3IONS-SCNC: 17 MMOL/L (ref 5–15)
AST SERPL-CCNC: 24 U/L (ref 1–32)
BACTERIA UR QL AUTO: NORMAL /HPF
BASOPHILS # BLD AUTO: 0.01 10*3/MM3 (ref 0–0.2)
BASOPHILS NFR BLD AUTO: 0.1 % (ref 0–1.5)
BILIRUB SERPL-MCNC: 0.6 MG/DL (ref 0–1.2)
BILIRUB UR QL STRIP: NEGATIVE
BUN SERPL-MCNC: 56 MG/DL (ref 8–23)
BUN/CREAT SERPL: 25.6 (ref 7–25)
CALCIUM SPEC-SCNC: 9.9 MG/DL (ref 8.6–10.5)
CHLORIDE SERPL-SCNC: 100 MMOL/L (ref 98–107)
CLARITY UR: CLEAR
CO2 SERPL-SCNC: 25 MMOL/L (ref 22–29)
COLOR UR: YELLOW
CREAT SERPL-MCNC: 2.19 MG/DL (ref 0.57–1)
D-LACTATE SERPL-SCNC: 1.3 MMOL/L (ref 0.5–2)
DEPRECATED RDW RBC AUTO: 42 FL (ref 37–54)
EGFRCR SERPLBLD CKD-EPI 2021: 24.6 ML/MIN/1.73
EOSINOPHIL # BLD AUTO: 0 10*3/MM3 (ref 0–0.4)
EOSINOPHIL NFR BLD AUTO: 0 % (ref 0.3–6.2)
ERYTHROCYTE [DISTWIDTH] IN BLOOD BY AUTOMATED COUNT: 12.8 % (ref 12.3–15.4)
GLOBULIN UR ELPH-MCNC: 3.6 GM/DL
GLUCOSE BLDC GLUCOMTR-MCNC: 126 MG/DL (ref 70–130)
GLUCOSE SERPL-MCNC: 169 MG/DL (ref 65–99)
GLUCOSE UR STRIP-MCNC: ABNORMAL MG/DL
HCT VFR BLD AUTO: 43.2 % (ref 34–46.6)
HGB BLD-MCNC: 14.8 G/DL (ref 12–15.9)
HGB UR QL STRIP.AUTO: ABNORMAL
HOLD SPECIMEN: NORMAL
HYALINE CASTS UR QL AUTO: NORMAL /LPF
IMM GRANULOCYTES # BLD AUTO: 0.05 10*3/MM3 (ref 0–0.05)
IMM GRANULOCYTES NFR BLD AUTO: 0.5 % (ref 0–0.5)
KETONES UR QL STRIP: NEGATIVE
LEUKOCYTE ESTERASE UR QL STRIP.AUTO: NEGATIVE
LIPASE SERPL-CCNC: 22 U/L (ref 13–60)
LYMPHOCYTES # BLD AUTO: 1.68 10*3/MM3 (ref 0.7–3.1)
LYMPHOCYTES NFR BLD AUTO: 15.8 % (ref 19.6–45.3)
MCH RBC QN AUTO: 30.2 PG (ref 26.6–33)
MCHC RBC AUTO-ENTMCNC: 34.3 G/DL (ref 31.5–35.7)
MCV RBC AUTO: 88.2 FL (ref 79–97)
MONOCYTES # BLD AUTO: 0.48 10*3/MM3 (ref 0.1–0.9)
MONOCYTES NFR BLD AUTO: 4.5 % (ref 5–12)
NEUTROPHILS NFR BLD AUTO: 79.1 % (ref 42.7–76)
NEUTROPHILS NFR BLD AUTO: 8.4 10*3/MM3 (ref 1.7–7)
NITRITE UR QL STRIP: NEGATIVE
NRBC BLD AUTO-RTO: 0 /100 WBC (ref 0–0.2)
PH UR STRIP.AUTO: 5.5 [PH] (ref 5–8)
PLATELET # BLD AUTO: 398 10*3/MM3 (ref 140–450)
PMV BLD AUTO: 10.3 FL (ref 6–12)
POTASSIUM SERPL-SCNC: 3.1 MMOL/L (ref 3.5–5.2)
PROT SERPL-MCNC: 8.3 G/DL (ref 6–8.5)
PROT UR QL STRIP: ABNORMAL
RBC # BLD AUTO: 4.9 10*6/MM3 (ref 3.77–5.28)
RBC # UR STRIP: NORMAL /HPF
REF LAB TEST METHOD: NORMAL
SODIUM SERPL-SCNC: 142 MMOL/L (ref 136–145)
SP GR UR STRIP: 1.02 (ref 1–1.03)
SQUAMOUS #/AREA URNS HPF: NORMAL /HPF
UROBILINOGEN UR QL STRIP: ABNORMAL
WBC # UR STRIP: NORMAL /HPF
WBC NRBC COR # BLD: 10.62 10*3/MM3 (ref 3.4–10.8)
WHOLE BLOOD HOLD COAG: NORMAL
WHOLE BLOOD HOLD SPECIMEN: NORMAL

## 2023-02-03 PROCEDURE — 85025 COMPLETE CBC W/AUTO DIFF WBC: CPT

## 2023-02-03 PROCEDURE — G0378 HOSPITAL OBSERVATION PER HR: HCPCS

## 2023-02-03 PROCEDURE — 25010000002 HYDRALAZINE PER 20 MG: Performed by: INTERNAL MEDICINE

## 2023-02-03 PROCEDURE — 99285 EMERGENCY DEPT VISIT HI MDM: CPT

## 2023-02-03 PROCEDURE — 80053 COMPREHEN METABOLIC PANEL: CPT

## 2023-02-03 PROCEDURE — 25010000002 METOCLOPRAMIDE PER 10 MG: Performed by: INTERNAL MEDICINE

## 2023-02-03 PROCEDURE — 82962 GLUCOSE BLOOD TEST: CPT

## 2023-02-03 PROCEDURE — 83690 ASSAY OF LIPASE: CPT

## 2023-02-03 PROCEDURE — 96376 TX/PRO/DX INJ SAME DRUG ADON: CPT

## 2023-02-03 PROCEDURE — 96375 TX/PRO/DX INJ NEW DRUG ADDON: CPT

## 2023-02-03 PROCEDURE — 25010000002 DIPHENHYDRAMINE PER 50 MG: Performed by: EMERGENCY MEDICINE

## 2023-02-03 PROCEDURE — 25010000002 METOCLOPRAMIDE PER 10 MG: Performed by: EMERGENCY MEDICINE

## 2023-02-03 PROCEDURE — P9612 CATHETERIZE FOR URINE SPEC: HCPCS

## 2023-02-03 PROCEDURE — 96374 THER/PROPH/DIAG INJ IV PUSH: CPT

## 2023-02-03 PROCEDURE — 99222 1ST HOSP IP/OBS MODERATE 55: CPT | Performed by: INTERNAL MEDICINE

## 2023-02-03 PROCEDURE — 81001 URINALYSIS AUTO W/SCOPE: CPT

## 2023-02-03 PROCEDURE — 96361 HYDRATE IV INFUSION ADD-ON: CPT

## 2023-02-03 PROCEDURE — 83605 ASSAY OF LACTIC ACID: CPT

## 2023-02-03 RX ORDER — CARVEDILOL 6.25 MG/1
6.25 TABLET ORAL 2 TIMES DAILY
Status: DISCONTINUED | OUTPATIENT
Start: 2023-02-03 | End: 2023-02-06 | Stop reason: HOSPADM

## 2023-02-03 RX ORDER — SODIUM CHLORIDE 9 MG/ML
10 INJECTION INTRAVENOUS AS NEEDED
Status: DISCONTINUED | OUTPATIENT
Start: 2023-02-03 | End: 2023-02-06 | Stop reason: HOSPADM

## 2023-02-03 RX ORDER — SODIUM CHLORIDE 9 MG/ML
40 INJECTION, SOLUTION INTRAVENOUS AS NEEDED
Status: DISCONTINUED | OUTPATIENT
Start: 2023-02-03 | End: 2023-02-06 | Stop reason: HOSPADM

## 2023-02-03 RX ORDER — DONEPEZIL HYDROCHLORIDE 5 MG/1
5 TABLET, FILM COATED ORAL NIGHTLY
Status: DISCONTINUED | OUTPATIENT
Start: 2023-02-03 | End: 2023-02-06 | Stop reason: HOSPADM

## 2023-02-03 RX ORDER — FLUOXETINE HYDROCHLORIDE 20 MG/1
20 CAPSULE ORAL DAILY
Status: DISCONTINUED | OUTPATIENT
Start: 2023-02-03 | End: 2023-02-06 | Stop reason: HOSPADM

## 2023-02-03 RX ORDER — ALBUTEROL SULFATE 2.5 MG/3ML
2.5 SOLUTION RESPIRATORY (INHALATION) EVERY 4 HOURS PRN
Status: DISCONTINUED | OUTPATIENT
Start: 2023-02-03 | End: 2023-02-06 | Stop reason: HOSPADM

## 2023-02-03 RX ORDER — ACETAMINOPHEN 325 MG/1
650 TABLET ORAL EVERY 4 HOURS PRN
Status: DISCONTINUED | OUTPATIENT
Start: 2023-02-03 | End: 2023-02-06 | Stop reason: HOSPADM

## 2023-02-03 RX ORDER — PANTOPRAZOLE SODIUM 40 MG/1
40 TABLET, DELAYED RELEASE ORAL DAILY
Status: DISCONTINUED | OUTPATIENT
Start: 2023-02-03 | End: 2023-02-06 | Stop reason: HOSPADM

## 2023-02-03 RX ORDER — HYDRALAZINE HYDROCHLORIDE 20 MG/ML
10 INJECTION INTRAMUSCULAR; INTRAVENOUS EVERY 6 HOURS PRN
Status: DISCONTINUED | OUTPATIENT
Start: 2023-02-03 | End: 2023-02-06 | Stop reason: HOSPADM

## 2023-02-03 RX ORDER — CHOLECALCIFEROL (VITAMIN D3) 125 MCG
5 CAPSULE ORAL NIGHTLY PRN
Status: DISCONTINUED | OUTPATIENT
Start: 2023-02-03 | End: 2023-02-03 | Stop reason: SDUPTHER

## 2023-02-03 RX ORDER — METOCLOPRAMIDE HYDROCHLORIDE 5 MG/ML
5 INJECTION INTRAMUSCULAR; INTRAVENOUS
Status: DISCONTINUED | OUTPATIENT
Start: 2023-02-03 | End: 2023-02-04

## 2023-02-03 RX ORDER — MIRTAZAPINE 15 MG/1
15 TABLET, FILM COATED ORAL NIGHTLY
Status: DISCONTINUED | OUTPATIENT
Start: 2023-02-03 | End: 2023-02-04

## 2023-02-03 RX ORDER — CHOLECALCIFEROL (VITAMIN D3) 125 MCG
5 CAPSULE ORAL NIGHTLY PRN
Status: DISCONTINUED | OUTPATIENT
Start: 2023-02-03 | End: 2023-02-06 | Stop reason: HOSPADM

## 2023-02-03 RX ORDER — IBUPROFEN 600 MG/1
1 TABLET ORAL
Status: DISCONTINUED | OUTPATIENT
Start: 2023-02-03 | End: 2023-02-06 | Stop reason: HOSPADM

## 2023-02-03 RX ORDER — PROCHLORPERAZINE EDISYLATE 5 MG/ML
5 INJECTION INTRAMUSCULAR; INTRAVENOUS EVERY 6 HOURS PRN
Status: DISCONTINUED | OUTPATIENT
Start: 2023-02-03 | End: 2023-02-06 | Stop reason: HOSPADM

## 2023-02-03 RX ORDER — PROCHLORPERAZINE MALEATE 5 MG/1
5 TABLET ORAL EVERY 6 HOURS PRN
Status: DISCONTINUED | OUTPATIENT
Start: 2023-02-03 | End: 2023-02-06 | Stop reason: HOSPADM

## 2023-02-03 RX ORDER — ATORVASTATIN CALCIUM 40 MG/1
80 TABLET, FILM COATED ORAL NIGHTLY
Status: DISCONTINUED | OUTPATIENT
Start: 2023-02-03 | End: 2023-02-06 | Stop reason: HOSPADM

## 2023-02-03 RX ORDER — NICOTINE POLACRILEX 4 MG
15 LOZENGE BUCCAL
Status: DISCONTINUED | OUTPATIENT
Start: 2023-02-03 | End: 2023-02-06 | Stop reason: HOSPADM

## 2023-02-03 RX ORDER — PANTOPRAZOLE SODIUM 40 MG/1
40 TABLET, DELAYED RELEASE ORAL DAILY
Qty: 30 TABLET | Refills: 5 | Status: SHIPPED | OUTPATIENT
Start: 2023-02-03

## 2023-02-03 RX ORDER — TERAZOSIN 2 MG/1
2 CAPSULE ORAL NIGHTLY
Status: DISCONTINUED | OUTPATIENT
Start: 2023-02-03 | End: 2023-02-06 | Stop reason: HOSPADM

## 2023-02-03 RX ORDER — SODIUM CHLORIDE 0.9 % (FLUSH) 0.9 %
10 SYRINGE (ML) INJECTION EVERY 12 HOURS SCHEDULED
Status: DISCONTINUED | OUTPATIENT
Start: 2023-02-03 | End: 2023-02-06 | Stop reason: HOSPADM

## 2023-02-03 RX ORDER — INSULIN LISPRO 100 [IU]/ML
0-9 INJECTION, SOLUTION INTRAVENOUS; SUBCUTANEOUS
Status: DISCONTINUED | OUTPATIENT
Start: 2023-02-03 | End: 2023-02-04

## 2023-02-03 RX ORDER — GABAPENTIN 400 MG/1
400 CAPSULE ORAL NIGHTLY
Status: DISCONTINUED | OUTPATIENT
Start: 2023-02-03 | End: 2023-02-04

## 2023-02-03 RX ORDER — SODIUM CHLORIDE 9 MG/ML
100 INJECTION, SOLUTION INTRAVENOUS CONTINUOUS
Status: DISCONTINUED | OUTPATIENT
Start: 2023-02-03 | End: 2023-02-04

## 2023-02-03 RX ORDER — PROCHLORPERAZINE 25 MG
25 SUPPOSITORY, RECTAL RECTAL EVERY 12 HOURS PRN
Status: DISCONTINUED | OUTPATIENT
Start: 2023-02-03 | End: 2023-02-06 | Stop reason: HOSPADM

## 2023-02-03 RX ORDER — SODIUM CHLORIDE 0.9 % (FLUSH) 0.9 %
10 SYRINGE (ML) INJECTION AS NEEDED
Status: DISCONTINUED | OUTPATIENT
Start: 2023-02-03 | End: 2023-02-06 | Stop reason: HOSPADM

## 2023-02-03 RX ORDER — POTASSIUM CHLORIDE 750 MG/1
40 CAPSULE, EXTENDED RELEASE ORAL ONCE
Status: COMPLETED | OUTPATIENT
Start: 2023-02-03 | End: 2023-02-03

## 2023-02-03 RX ORDER — DEXTROSE MONOHYDRATE 25 G/50ML
25 INJECTION, SOLUTION INTRAVENOUS
Status: DISCONTINUED | OUTPATIENT
Start: 2023-02-03 | End: 2023-02-06 | Stop reason: HOSPADM

## 2023-02-03 RX ORDER — DIPHENHYDRAMINE HYDROCHLORIDE 50 MG/ML
12.5 INJECTION INTRAMUSCULAR; INTRAVENOUS ONCE
Status: COMPLETED | OUTPATIENT
Start: 2023-02-03 | End: 2023-02-03

## 2023-02-03 RX ORDER — METOCLOPRAMIDE HYDROCHLORIDE 5 MG/ML
10 INJECTION INTRAMUSCULAR; INTRAVENOUS ONCE
Status: COMPLETED | OUTPATIENT
Start: 2023-02-03 | End: 2023-02-03

## 2023-02-03 RX ADMIN — METOCLOPRAMIDE 10 MG: 5 INJECTION, SOLUTION INTRAMUSCULAR; INTRAVENOUS at 13:18

## 2023-02-03 RX ADMIN — METOCLOPRAMIDE 5 MG: 5 INJECTION, SOLUTION INTRAMUSCULAR; INTRAVENOUS at 22:05

## 2023-02-03 RX ADMIN — METOCLOPRAMIDE 5 MG: 5 INJECTION, SOLUTION INTRAMUSCULAR; INTRAVENOUS at 17:15

## 2023-02-03 RX ADMIN — PANTOPRAZOLE SODIUM 40 MG: 40 TABLET, DELAYED RELEASE ORAL at 17:15

## 2023-02-03 RX ADMIN — DIPHENHYDRAMINE HYDROCHLORIDE 12.5 MG: 50 INJECTION INTRAMUSCULAR; INTRAVENOUS at 13:18

## 2023-02-03 RX ADMIN — SODIUM CHLORIDE 1000 ML: 9 INJECTION, SOLUTION INTRAVENOUS at 13:18

## 2023-02-03 RX ADMIN — HYDRALAZINE HYDROCHLORIDE 10 MG: 20 INJECTION INTRAMUSCULAR; INTRAVENOUS at 17:15

## 2023-02-03 RX ADMIN — FLUOXETINE 20 MG: 20 CAPSULE ORAL at 17:15

## 2023-02-03 RX ADMIN — ATORVASTATIN CALCIUM 80 MG: 40 TABLET, FILM COATED ORAL at 22:04

## 2023-02-03 RX ADMIN — GABAPENTIN 400 MG: 400 CAPSULE ORAL at 22:05

## 2023-02-03 RX ADMIN — RIVAROXABAN 15 MG: 15 TABLET, FILM COATED ORAL at 17:15

## 2023-02-03 RX ADMIN — Medication 10 ML: at 22:05

## 2023-02-03 RX ADMIN — MIRTAZAPINE 15 MG: 15 TABLET, FILM COATED ORAL at 22:05

## 2023-02-03 RX ADMIN — SODIUM CHLORIDE 100 ML/HR: 9 INJECTION, SOLUTION INTRAVENOUS at 17:21

## 2023-02-03 RX ADMIN — TERAZOSIN HYDROCHLORIDE 2 MG: 2 CAPSULE ORAL at 22:05

## 2023-02-03 RX ADMIN — CARVEDILOL 6.25 MG: 6.25 TABLET, FILM COATED ORAL at 22:05

## 2023-02-03 RX ADMIN — POTASSIUM CHLORIDE 40 MEQ: 750 CAPSULE, EXTENDED RELEASE ORAL at 14:02

## 2023-02-03 RX ADMIN — DONEPEZIL HYDROCHLORIDE 5 MG: 5 TABLET, FILM COATED ORAL at 22:05

## 2023-02-03 NOTE — ED PROVIDER NOTES
Subjective   History of Present Illness  Mrs. Michael presents with nausea, vomiting, diarrhea, and abdominal pain.  She tells me it feels like her typical gastroparesis.  This started 4 days ago.  She tells me today the diarrhea and vomiting have stopped but she has been very nauseous and still having epigastric pain.  She was admitted here in December with similar symptoms.  Improved with Reglan and IV fluids.  She sees a gastroparesis specialist in [place].  She is scheduled to have a permanent gastric stimulator placed on Monday which is 3 days from now.        Review of Systems    Past Medical History:   Diagnosis Date   • Acid reflux    • Acute bronchitis    • Cardiac murmur    • Diabetes mellitus (HCC)    • H/O echocardiogram 08/07/2012    i. LVEF 65%.ii. Mild LVH.iii. Borderline evidence of atrial septal aneurysm.  No PFO.    • History of nuclear stress test 08/22/2014    Negative for ischemia and scars; LVEF 77%.     • Hyperlipidemia    • Hypertension    • Impacted cerumen of both ears    • Migraine    • Self-catheterizes urinary bladder    • Sinusitis    • Stroke (HCC)    • Tobacco abuse     quit 4 days ago.     • Urticaria        No Known Allergies    Past Surgical History:   Procedure Laterality Date   • CAPSULE ENDOSCOPY  07/27/2021    Procedure: PILLCAM DEPLOYMENT;  Surgeon: Mikael Worthy MD;  Location:  [facility];  Service: Gastroenterology;;   • COLONOSCOPY     • COLONOSCOPY N/A 07/27/2021    Procedure: COLONOSCOPY;  Surgeon: Mikael Worthy MD;  Location:  [facility];  Service: Gastroenterology;  Laterality: N/A;   • DENTAL PROCEDURE     • ENDOSCOPY N/A 06/30/2021    Procedure: ESOPHAGOGASTRODUODENOSCOPY;  Surgeon: Brunner, Mark I, MD;  Location:  [facility];  Service: Gastroenterology;  Laterality: N/A;   • ENDOSCOPY N/A 07/27/2021    Procedure: ESOPHAGOGASTRODUODENOSCOPY;  Surgeon: Mikael Worthy MD;  Location:  [facility];  Service: Gastroenterology;   Laterality: N/A;   • ENDOSCOPY WITH JTUBE N/A 03/16/2022    Procedure: ESOPHAGOGASTRODUODENOSCOPY WITH JEJUNAL TUBE INSERTION;  Surgeon: Thom Glover MD;  Location: [facility];  Service: Gastroenterology;  Laterality: N/A;   • NO PAST SURGERIES     • UPPER GASTROINTESTINAL ENDOSCOPY         Family History   Problem Relation Age of Onset   • Anxiety disorder Other    • Arthritis Other    • ADD / ADHD Other    • Heart disease Other         cardiac disorder   • Depression Other    • Diabetes Other    • Hyperlipidemia Other    • Hypertension Other    • Lung cancer Other    • Osteoporosis Other    • Hypertension Brother    • Diabetes Brother    • Hyperlipidemia Mother    • Hypertension Mother    • Colon cancer Neg Hx        Social History     Socioeconomic History   • Marital status:    Tobacco Use   • Smoking status: Former     Packs/day: 0.25     Years: 30.00     Pack years: 7.50     Types: Cigarettes     Quit date: 5/28/2019     Years since quitting: 3.6   • Smokeless tobacco: Never   • Tobacco comments:     BC PL never smoker    Vaping Use   • Vaping Use: Never used   Substance and Sexual Activity   • Alcohol use: Yes     Alcohol/week: 1.0 standard drink     Types: 1 Glasses of wine per week     Comment: ocassional   • Drug use: No   • Sexual activity: Not Currently     Comment: Single            Objective   Physical Exam  Vitals and nursing note reviewed.   Constitutional:       General: She is not in acute distress.     Appearance: Normal appearance. She is ill-appearing.      Comments: She is thin, she is able to answer questions appropriately, appears uncomfortable   HENT:      Head: Normocephalic and atraumatic.      Nose: Nose normal. No congestion or rhinorrhea.      Mouth/Throat:      Comments: Mucous membranes are very dry  Eyes:      General: No scleral icterus.     Conjunctiva/sclera: Conjunctivae normal.   Neck:      Comments: No JVD   Cardiovascular:      Rate and Rhythm: Normal rate  and regular rhythm.      Heart sounds: No murmur heard.    No friction rub.   Pulmonary:      Effort: Pulmonary effort is normal.      Breath sounds: Normal breath sounds. No wheezing or rales.   Abdominal:      General: Abdomen is flat. Bowel sounds are normal.      Palpations: Abdomen is soft.      Tenderness: There is abdominal tenderness in the right upper quadrant, epigastric area and left upper quadrant. There is no guarding or rebound.      Comments: She has very mild tenderness across the upper abdomen   Musculoskeletal:         General: No tenderness.      Cervical back: Normal range of motion and neck supple.      Right lower leg: No edema.      Left lower leg: No edema.   Skin:     General: Skin is warm and dry.      Coloration: Skin is not pale.      Findings: No erythema.      Comments: Skin turgor is poor   Neurological:      General: No focal deficit present.      Mental Status: She is alert and oriented to person, place, and time.      Motor: No weakness.      Coordination: Coordination normal.   Psychiatric:         Mood and Affect: Mood normal.         Behavior: Behavior normal.         Thought Content: Thought content normal.         Procedures           ED Course  ED Course as of 02/03/23 1338   Fri Feb 03, 2023   1319 Laboratory exam shows significant rise in creatinine.  Fluids are infusing.  She will require admission for acute renal failure. [DT]   1320 I spoke with Mrs. Michael about findings and need for admission.  I have messaged the hospitalist on-call and will seek admission [DT]      ED Course User Index  [DT] Ramón Dao MD                                           Medical Decision Making  Please refer to emergency department course.  I saw her in and reviewed her extensive history.  Ordered IV fluids.  She had responded well to Reglan in the past also have given that.  Laboratory exam showed acute renal failure and I have made arrangements to admit her to the hospital for that.   I have had repeat exam and updated her on findings.    Acute abdominal pain: complicated acute illness or injury  Acute renal failure, unspecified acute renal failure type (HCC): acute illness or injury that poses a threat to life or bodily functions  Gastroparesis: chronic illness or injury with exacerbation, progression, or side effects of treatment  Volume depletion: acute illness or injury that poses a threat to life or bodily functions  Amount and/or Complexity of Data Reviewed  External Data Reviewed: notes.  Labs:  Decision-making details documented in ED Course.      Risk  Prescription drug management.  Decision regarding hospitalization.          Final diagnoses:   Volume depletion   Acute renal failure, unspecified acute renal failure type (HCC)   Gastroparesis   Acute abdominal pain       ED Disposition  ED Disposition     ED Disposition   Decision to Admit    Condition   --    Comment   Level of Care: Telemetry [5]   Diagnosis: Volume depletion [[id number]]   Admitting Physician: WILMAN JESSICA II [[id number]]   Attending Physician: WILMAN JESSICA II [[id number]]   Certification: I Certify That Inpatient Hospital Services Are Medically Necessary For Greater Than 2 Midnights               No follow-up provider specified.       Medication List      No changes were made to your prescriptions during this visit.          Ramón Dao MD  02/03/23 3173

## 2023-02-03 NOTE — CASE MANAGEMENT/SOCIAL WORK
Discharge Planning Assessment  [facility]     Patient Name: Thelma Michael  MRN: [id number]  Today's Date: 2/3/2023    Admit Date: 2/3/2023    Plan: Initial   Discharge Needs Assessment     Row Name 02/03/23 1438       Living Environment    People in Home child(bronson), adult    Name(s) of People in Home VINAYAK PENN Son [phone]    Current Living Arrangements home    Primary Care Provided by self    Provides Primary Care For no one    Family Caregiver if Needed child(bronson), adult    Family Caregiver Names VINAYAK PENN Son [phone]    Quality of Family Relationships helpful;involved;supportive    Able to Return to Prior Arrangements yes       Resource/Environmental Concerns    Resource/Environmental Concerns none    Transportation Concerns none       Food Insecurity    Within the past 12 months, you worried that your food would run out before you got the money to buy more. Never true    Within the past 12 months, the food you bought just didn't last and you didn't have money to get more. Never true       Transition Planning    Patient/Family Anticipates Transition to home with family    Patient/Family Anticipated Services at Transition none    Transportation Anticipated family or friend will provide       Discharge Needs Assessment    Readmission Within the Last 30 Days no previous admission in last 30 days    Equipment Currently Used at Home glucometer;cane, straight;walker, standard    Concerns to be Addressed denies needs/concerns at this time    Anticipated Changes Related to Illness none    Current Discharge Risk chronically ill               Discharge Plan     Row Name 02/03/23 1440       Plan    Plan Initial    Plan Comments Patient spoke with patient at bedside regarding DC planning. Patient resides in [facility] with her adult son. Patient is independent with ADL’s, denies any DME. Patient denies any current home health or outpatient services. Patient has medical insurance, prescription  coverage and is able to afford/obtain medications without difficulty. Patient denies any discharge planning needs. Plan is home. CM will continue to follow    Final Discharge Disposition Code 30 - still a patient              Continued Care and Services - Admitted Since 2/3/2023    Coordination has not been started for this encounter.       Expected Discharge Date and Time     Expected Discharge Date Expected Discharge Time    Feb 5, 2023          Demographic Summary     Row Name 02/03/23 1429       General Information    Arrived From home    Referral Source emergency department    Reason for Consult discharge planning    Preferred Language English       Contact Information    Contact Information Comments VINAYAK PENN [phone]               Functional Status     Row Name 02/03/23 1430       Functional Status    Usual Activity Tolerance moderate    Current Activity Tolerance moderate       Physical Activity    On average, how many days per week do you engage in moderate to strenuous exercise (like a brisk walk)? 5 days    On average, how many minutes do you engage in exercise at this level? 10 min    Number of minutes of exercise per week 50       Assessment of Health Literacy    How often do you have someone help you read hospital materials? Occasionally    How often do you have problems learning about your medical condition because of difficulty understanding written information? Occasionally    How often do you have a problem understanding what is told to you about your medical condition? Occasionally    How confident are you filling out medical forms by yourself? Quite a bit    Health Literacy Good       Functional Status, IADL    Medications independent    Meal Preparation independent    Housekeeping independent    Laundry independent    Shopping independent       Mental Status    General Appearance WDL WDL       Mental Status Summary    Recent Changes in Mental Status/Cognitive Functioning no changes        Employment/    Employment Status retired               Psychosocial    No documentation.                Abuse/Neglect    No documentation.                Legal    No documentation.                Substance Abuse    No documentation.                Patient Forms    No documentation.                   Merlene Donaldson RN

## 2023-02-03 NOTE — PLAN OF CARE
Problem: Adult Inpatient Plan of Care  Goal: Plan of Care Review  Outcome: Ongoing, Progressing  Flowsheets (Taken 2/3/2023 1750)  Progress: no change  Plan of Care Reviewed With: patient  Outcome Evaluation: pt a/o x4. hypertensive. prn hydralizine given. pt states abdmonial discomfort is slightly better. pt ate a small amout of her supper and ate ice chips. no vomiting.  Goal: Patient-Specific Goal (Individualized)  Outcome: Ongoing, Progressing  Goal: Absence of Hospital-Acquired Illness or Injury  Outcome: Ongoing, Progressing  Intervention: Identify and Manage Fall Risk  Description: Perform standard risk assessment on admission using a validated tool or comprehensive approach appropriate to the patient; reassess fall risk frequently, with change in status or transfer to another level of care.  Communicate fall injury risk to interprofessional healthcare team.  Determine need for increased observation, equipment and environmental modification, such as low bed, signage and supportive, nonskid footwear.  Adjust safety measures to individual developmental age, stage and identified risk factors.  Reinforce the importance of safety and physical activity with patient and family.  Perform regular intentional rounding to assess need for position change, pain assessment and personal needs, including assistance with toileting.  Recent Flowsheet Documentation  Taken 2/3/2023 1600 by Fanny Malloy, RN  Safety Promotion/Fall Prevention:   activity supervised   assistive device/personal items within reach   clutter free environment maintained   fall prevention program maintained   nonskid shoes/slippers when out of bed   room organization consistent   safety round/check completed   toileting scheduled  Intervention: Prevent Skin Injury  Description: Perform a screening for skin injury risk, such as pressure or moisture associated skin damage on admission and at regular intervals throughout hospital stay.  Keep all areas  of skin (especially folds) clean and dry.  Maintain adequate skin hydration.  Relieve and redistribute pressure and protect bony prominences; implement measures based on patient-specific risk factors.  Match turning and repositioning schedule to clinical condition.  Encourage weight shift frequently; assist with reposition if unable to complete independently.  Float heels off bed; avoid pressure on the Achilles tendon.  Keep skin free from extended contact with medical devices.  Encourage functional activity and mobility, as early as tolerated.  Use aids (e.g., slide boards, mechanical lift) during transfer.  Recent Flowsheet Documentation  Taken 2/3/2023 1600 by Fanny Malloy RN  Body Position: position changed independently  Intervention: Prevent and Manage VTE (Venous Thromboembolism) Risk  Description: Assess for VTE (venous thromboembolism) risk.  Encourage and assist with early ambulation.  Initiate and maintain compression or other therapy, as indicated, based on identified risk in accordance with organizational protocol and provider order.  Encourage both active and passive leg exercises while in bed, if unable to ambulate.  Recent Flowsheet Documentation  Taken 2/3/2023 1518 by Fanny Malloy RN  Range of Motion: active ROM (range of motion) encouraged  Intervention: Prevent Infection  Description: Maintain skin and mucous membrane integrity; promote hand, oral and pulmonary hygiene.  Optimize fluid balance, nutrition, sleep and glycemic control to maximize infection resistance.  Identify potential sources of infection early to prevent or mitigate progression of infection (e.g., wound, lines, devices).  Evaluate ongoing need for invasive devices; remove promptly when no longer indicated.  Recent Flowsheet Documentation  Taken 2/3/2023 1600 by Fanny Malloy RN  Infection Prevention: environmental surveillance performed  Goal: Optimal Comfort and Wellbeing  Outcome: Ongoing, Progressing  Intervention:  Provide Person-Centered Care  Description: Use a family-focused approach to care.  Develop trust and rapport by proactively providing information, encouraging questions, addressing concerns and offering reassurance.  Acknowledge emotional response to hospitalization.  Recognize and utilize personal coping strategies.  Honor spiritual and cultural preferences.  Recent Flowsheet Documentation  Taken 2/3/2023 1600 by Fanny Malloy RN  Trust Relationship/Rapport:   care explained   thoughts/feelings acknowledged   emotional support provided  Taken 2/3/2023 1518 by Fanny Malloy RN  Trust Relationship/Rapport:   care explained   thoughts/feelings acknowledged   emotional support provided   choices provided  Goal: Readiness for Transition of Care  Outcome: Ongoing, Progressing  Intervention: Mutually Develop Transition Plan  Description: Identify available resources for support (e.g., family, friends, community).  Identify and address barriers to ongoing treatment and home management (e.g., environmental, financial).  Provide opportunities to practice self-management skills.  Assess and monitor emotional readiness for transition.  Establish or reconnect linkage with outpatient providers or community-based services.  Recent Flowsheet Documentation  Taken 2/3/2023 1507 by Fanny Malloy RN  Equipment Currently Used at Home:   bp cuff   glucometer     Problem: Pain Acute  Goal: Acceptable Pain Control and Functional Ability  Outcome: Ongoing, Progressing  Intervention: Prevent or Manage Pain  Description: Evaluate pain level, effect of treatment and patient response at regular intervals.  Minimize painful stimuli; coordinate care and adjust environment (e.g., light, noise, unnecessary movement); promote sleep/rest.  Match pharmacologic analgesia to severity and type of pain mechanism (e.g., neuropathic, muscle, inflammatory); consider multimodal approach (e.g., nonopioid, opioid, adjuvant).  Provide medication at  regular intervals; titrate to patient response; premedicate for painful procedures.  Manage breakthrough pain with additional doses; consider rotation or switching medication.  Monitor for signs of substance tolerance (increased dose to reach desired effect, decreased effect with same dose).  Manage medication-induced effects, such as constipation, nausea, pruritus, urinary retention, somnolence and dizziness.  Provide multimodal interventions, such as as physical activity, therapeutic exercise, yoga, TENS (transcutaneous electrical nerve stimulation) and manual therapy.  Train in functional activity modifications, such as body mechanics, posture, ergonomics, energy conservation and activity pacing.  Consider addition of complementary or alternative therapy, such as acupuncture, hypnosis or therapeutic touch.  Recent Flowsheet Documentation  Taken 2/3/2023 1600 by Fanny Malloy RN  Medication Review/Management: medications reviewed  Intervention: Optimize Psychosocial Wellbeing  Description: Facilitate patient’s self-control over pain by providing pain information and allowing choices in treatment.  Consider and address emotional response to pain.  Explore and promote use of coping strategies; address barriers to successful coping.  Evaluate and assist with psychosocial, cultural and spiritual factors impacting pain.  Modify pain perception using techniques, such as distraction, mindfulness, guided imagery, meditation or music.  Assess for risk factors for developing chronic pain, such as depression, fear, pain avoidance and pain catastrophizing.  Consider referral for ongoing coping support, such as education, relaxation training and role of thoughts.  Recent Flowsheet Documentation  Taken 2/3/2023 1600 by Fanny Malloy RN  Diversional Activities: smartphone  Taken 2/3/2023 1518 by Fanny Malloy RN  Supportive Measures:   active listening utilized   verbalization of feelings encouraged   self-responsibility  promoted   relaxation techniques promoted  Diversional Activities: smartphone  Goal: Acceptable Pain Control and Functional Ability  Outcome: Ongoing, Progressing  Intervention: Prevent or Manage Pain  Description: Evaluate pain level, effect of treatment and patient response at regular intervals.  Minimize painful stimuli; coordinate care and adjust environment (e.g., light, noise, unnecessary movement); promote sleep/rest.  Match pharmacologic analgesia to severity and type of pain mechanism (e.g., neuropathic, muscle, inflammatory); consider multimodal approach (e.g., nonopioid, opioid, adjuvant).  Provide medication at regular intervals; titrate to patient response; premedicate for painful procedures.  Manage breakthrough pain with additional doses; consider rotation or switching medication.  Monitor for signs of substance tolerance (increased dose to reach desired effect, decreased effect with same dose).  Manage medication-induced effects, such as constipation, nausea, pruritus, urinary retention, somnolence and dizziness.  Provide multimodal interventions, such as as physical activity, therapeutic exercise, yoga, TENS (transcutaneous electrical nerve stimulation) and manual therapy.  Train in functional activity modifications, such as body mechanics, posture, ergonomics, energy conservation and activity pacing.  Consider addition of complementary or alternative therapy, such as acupuncture, hypnosis or therapeutic touch.  Recent Flowsheet Documentation  Taken 2/3/2023 1600 by Fanny Malloy RN  Medication Review/Management: medications reviewed  Intervention: Optimize Psychosocial Wellbeing  Description: Facilitate patient’s self-control over pain by providing pain information and allowing choices in treatment.  Consider and address emotional response to pain.  Explore and promote use of coping strategies; address barriers to successful coping.  Evaluate and assist with psychosocial, cultural and spiritual  factors impacting pain.  Modify pain perception using techniques, such as distraction, mindfulness, guided imagery, meditation or music.  Assess for risk factors for developing chronic pain, such as depression, fear, pain avoidance and pain catastrophizing.  Consider referral for ongoing coping support, such as education, relaxation training and role of thoughts.  Recent Flowsheet Documentation  Taken 2/3/2023 1600 by Fanny Malloy RN  Diversional Activities: smartphone  Taken 2/3/2023 1518 by Fanny Malloy RN  Supportive Measures:   active listening utilized   verbalization of feelings encouraged   self-responsibility promoted   relaxation techniques promoted  Diversional Activities: smartphone     Problem: Nausea and Vomiting  Goal: Fluid and Electrolyte Balance  Outcome: Ongoing, Progressing  Intervention: Prevent and Manage Nausea and Vomiting  Description: Adjust position to prevent aspiration.  Monitor intake, output and laboratory value trends; advocate for adjustment in treatment with imbalance.  Evaluate medication (addition, withdrawal, toxicity) as potential source or trigger, such as an opioid agent.  Administer antiemetic agent per prescribed regimen.  Assess fluid status and ability to take oral fluids; if unable to provide or achieve oral intake, provide intravenous fluid therapy and electrolyte replacement.  Promote oral hygiene after each emesis episode to protect the mouth from hydrochloric acid damage.  Consider complementary therapy, such as acupuncture, point P6 stimulation, aromatherapy or controlled breathing.  Provide comfort measures, such as use of a cool cloth and decreased environmental stimuli, including light, noise and odor.  Recent Flowsheet Documentation  Taken 2/3/2023 1518 by Fanny Malloy RN  Environmental Support: calm environment promoted     Problem: Diabetes Comorbidity  Goal: Blood Glucose Level Within Targeted Range  Outcome: Ongoing, Progressing     Problem:  Hypertension Comorbidity  Goal: Blood Pressure in Desired Range  Outcome: Ongoing, Progressing  Intervention: Maintain Blood Pressure Management  Description: Evaluate adherence to home antihypertensive regimen (e.g., exercise and activity, diet modification, medication).  Provide scheduled antihypertensive medication; consider administration time and effects (e.g., avoid giving diuretic prior to bedtime).  Monitor response to antihypertensive medication therapy (e.g., blood pressure, electrolyte levels, medication effects).  Minimize risk of orthostatic hypotension; encourage caution with position changes, particularly if elderly.  Recent Flowsheet Documentation  Taken 2/3/2023 1600 by Fanny Malloy RN  Medication Review/Management: medications reviewed   Goal Outcome Evaluation:  Plan of Care Reviewed With: patient        Progress: no change  Outcome Evaluation: pt a/o x4. hypertensive. prn hydralizine given. pt states abdmonial discomfort is slightly better. pt ate a small amout of her supper and ate ice chips. no vomiting.

## 2023-02-03 NOTE — TELEPHONE ENCOUNTER
Rx Refill Note  Requested Prescriptions     Pending Prescriptions Disp Refills   • pantoprazole (PROTONIX) 40 MG EC tablet 30 tablet 5     Sig: Take 1 tablet by mouth Daily.      Last office visit with prescribing clinician: 4/14/2022   Last telemedicine visit with prescribing clinician: 3/16/2023   Next office visit with prescribing clinician: 3/16/2023                           Pascual Cox MA  02/03/23, 11:32 EST

## 2023-02-03 NOTE — H&P
[facility] Medicine Services  HISTORY AND PHYSICAL    Patient Name: Thelma Michael  : 1960  MRN: [id number]  Primary Care Physician: Monet Howe APRN  Date of admission: 2/3/2023      Subjective   Subjective     Chief Complaint: n/v    HPI:  Thelma Michael is a 62 y.o. female presenting to ED with nausea, vomiting, and diarrhea. She began feeling bad about 4 days ago. At onset had some abdominal pain and diarrhea but that has since resolved. Has not been able to keep down any oral intake since onset. No f/c. Says this is fairly typical of her prior admissions which she relates to gastroparesis.    Review of Systems   Gen- No fevers, chills  CV- No chest pain, palpitations  Resp- No cough, dyspnea  GI- No N/V/D, abd pain    All other systems reviewed and are negative.     Personal History     Past Medical History:   Diagnosis Date   • Acid reflux    • Acute bronchitis    • Cardiac murmur    • Diabetes mellitus (HCC)    • H/O echocardiogram 2012    i. LVEF 65%.ii. Mild LVH.iii. Borderline evidence of atrial septal aneurysm.  No PFO.    • History of nuclear stress test 2014    Negative for ischemia and scars; LVEF 77%.     • Hyperlipidemia    • Hypertension    • Impacted cerumen of both ears    • Migraine    • Self-catheterizes urinary bladder    • Sinusitis    • Stroke (HCC)    • Tobacco abuse     quit 4 days ago.     • Urticaria              Past Surgical History:   Procedure Laterality Date   • CAPSULE ENDOSCOPY  2021    Procedure: PILLCAM DEPLOYMENT;  Surgeon: Mikael Worthy MD;  Location:  [facility];  Service: Gastroenterology;;   • COLONOSCOPY     • COLONOSCOPY N/A 2021    Procedure: COLONOSCOPY;  Surgeon: Mikael Worthy MD;  Location:  [facility];  Service: Gastroenterology;  Laterality: N/A;   • DENTAL PROCEDURE     • ENDOSCOPY N/A 2021    Procedure: ESOPHAGOGASTRODUODENOSCOPY;  Surgeon: Brunner,  Thom HURT MD;  Location:  [facility];  Service: Gastroenterology;  Laterality: N/A;   • ENDOSCOPY N/A 07/27/2021    Procedure: ESOPHAGOGASTRODUODENOSCOPY;  Surgeon: Mikael Worthy MD;  Location:  [facility];  Service: Gastroenterology;  Laterality: N/A;   • ENDOSCOPY WITH JTUBE N/A 03/16/2022    Procedure: ESOPHAGOGASTRODUODENOSCOPY WITH JEJUNAL TUBE INSERTION;  Surgeon: Thom Glover MD;  Location:  [facility];  Service: Gastroenterology;  Laterality: N/A;   • NO PAST SURGERIES     • UPPER GASTROINTESTINAL ENDOSCOPY         Family History:  family history includes ADD / ADHD in an other family member; Anxiety disorder in an other family member; Arthritis in an other family member; Depression in an other family member; Diabetes in her brother and another family member; Heart disease in an other family member; Hyperlipidemia in her mother and another family member; Hypertension in her brother, mother, and another family member; Lung cancer in an other family member; Osteoporosis in an other family member. Otherwise pertinent FHx was reviewed and unremarkable.     Social History:  reports that she quit smoking about 3 years ago. Her smoking use included cigarettes. She has a 7.50 pack-year smoking history. She has never used smokeless tobacco. She reports current alcohol use of about 1.0 standard drink per week. She reports that she does not use drugs.  Social History     Social History Narrative   • Not on file       Medications:  Available home medication information reviewed.  Ascorbic Acid, BASAGLAR KWIKPEN, Dexcom G6 Transmitter, FLUoxetine, Multivitamin-Minerals, acetaminophen, albuterol sulfate HFA, atorvastatin, calcium carbonate, carvedilol, diphenhydrAMINE, donepezil, doxazosin, estradiol, ferrous sulfate, gabapentin, glucose monitor, losartan, melatonin, mirtazapine, multivitamin, pantoprazole, rivaroxaban, sennosides-docusate, traMADol, traZODone, and vitamin D      No Known  Allergies    Objective   Objective     Vital Signs:   Temp:  [97.6 °F (36.4 °C)] 97.6 °F (36.4 °C)  Heart Rate:  [74-82] 82  Resp:  [18] 18  BP: (127-189)/(91-94) 127/91       Physical Exam   Constitutional: Awake, alert, lying in bed, chronically ill appearing  Eyes: PERRLA, sclerae anicteric, no conjunctival injection  HENT: NCAT, dry mm  Neck: Supple, no thyromegaly, no lymphadenopathy, trachea midline  Respiratory: Clear to auscultation bilaterally, nonlabored respirations   Cardiovascular: RRR, no murmurs, rubs, or gallops, palpable pedal pulses bilaterally  Gastrointestinal: Positive bowel sounds, soft, nontender, nondistended  Musculoskeletal: No bilateral ankle edema, no clubbing or cyanosis to extremities  Psychiatric: Appropriate affect, cooperative  Neurologic: Oriented x 3, strength symmetric in all extremities, Cranial Nerves grossly intact to confrontation, speech clear  Skin: No rashes    Result Review:  I have personally reviewed the results from the time of this admission to 2/3/2023 14:03 EST and agree with these findings:  []  Laboratory list / accordion  []  Microbiology  []  Radiology  []  EKG/Telemetry   []  Cardiology/Vascular   []  Pathology  []  Old records  []  Other:  Most notable findings include:        LAB RESULTS:      Lab 02/03/23  1210   WBC 10.62   HEMOGLOBIN 14.8   HEMATOCRIT 43.2   PLATELETS 398   NEUTROS ABS 8.40*   IMMATURE GRANS (ABS) 0.05   LYMPHS ABS 1.68   MONOS ABS 0.48   EOS ABS 0.00   MCV 88.2   LACTATE 1.3         Lab 02/03/23  1210   SODIUM 142   POTASSIUM 3.1*   CHLORIDE 100   CO2 25.0   ANION GAP 17.0*   BUN 56*   CREATININE 2.19*   EGFR 24.6*   GLUCOSE 169*   CALCIUM 9.9         Lab 02/03/23  1210   TOTAL PROTEIN 8.3   ALBUMIN 4.7   GLOBULIN 3.6   ALT (SGPT) 14   AST (SGOT) 24   BILIRUBIN 0.6   ALK PHOS 95   LIPASE 22                     UA    Urinalysis 9/20/22 9/20/22 9/20/22 9/20/22 12/7/22 12/7/22 2/3/23 2/3/23    1628 1628 2048 2048 0400 0400 1332 1332    Squamous Epithelial Cells, UA  21-30 (A)  3-6 (A)  0-2  0-2   Specific Gravity, UA 1.019  1.017  1.018  1.022    Ketones, UA 15 mg/dL (1+) (A)  15 mg/dL (1+) (A)  Trace (A)  Negative    Blood, UA Negative  Negative  Negative  Small (1+) (A)    Leukocytes, UA Trace (A)  Negative  Negative  Negative    Nitrite, UA Negative  Negative  Negative  Negative    RBC, UA  3-6 (A)  3-6 (A)  3-6 (A)  0-2   WBC, UA  6-12 (A)  3-5 (A)  0-2  0-2   Bacteria, UA  Trace  None Seen  None Seen  None Seen   (A) Abnormal value              Microbiology Results (last 10 days)     ** No results found for the last 240 hours. **          No radiology results from the last 24 hrs    Results for orders placed during the hospital encounter of 12/06/22    Adult Transthoracic Echo Complete W/ Cont if Necessary Per Protocol    Interpretation Summary  •  Left ventricular ejection fraction appears to be greater than 70%.  •  Left ventricular wall thickness is consistent with moderate concentric hypertrophy.  •  Left ventricular diastolic function is consistent with (grade I) impaired relaxation.  •  Estimated right ventricular systolic pressure from tricuspid regurgitation is mildly elevated (35-45 mmHg). Calculated right ventricular systolic pressure from tricuspid regurgitation is 39 mmHg.      Assessment & Plan   Assessment & Plan     Active Hospital Problems    Diagnosis  POA   • **Volume depletion [E86.9]  Yes   • Hypokalemia [E87.6]  Yes   • Gastroparesis [K31.84]  Yes   • Uncontrolled type 1 diabetes mellitus with hyperglycemia (HCC) [E10.65]  Yes     dx'd age 38 .      • Hypertensive urgency [I16.0]  Yes   • RAFAT (acute kidney injury) (HCC) [N17.9]  Yes     61 y/o female with uncontrolled diabetes and gastroparesis presenting with intractable n/v.    Intractable nausea and vomiting  Diabetic gastroparesis  Dehydration  --Based on her prior admissions will start Reglan and compazine. Ideally would like to have her out Sunday evening so she  can go to [place] for her gastric stimulator.  --IVF.  --CLD adv as tolerated.    RAFAT  --Due to above while using losartan. Hold losartan and hydrate as above.  --BMP in am.    HypoK  --40meq once and recheck in am.    Hypertensive urgency  --Due to inability to tolerate oral meds. Holding losartan. Will resume her prior dosing of coreg.   --IV hydralzine prn.    Uncontrolled type 1 DM  --Uses insulin pump at home. Can use pump here along with SSI.   --DM educator    Chronic a fib  --Coreg for rate control. Home Xarelto.    DVT prophylaxis:  Xarelto    CODE STATUS:  Full Code  Code Status and Medical Interventions:   Ordered at: 02/03/23 1338     Code Status (Patient has no pulse and is not breathing):    CPR (Attempt to Resuscitate)     Medical Interventions (Patient has pulse or is breathing):    Full Support       Expected Discharge   Expected Discharge Date and Time     Expected Discharge Date Expected Discharge Time    Feb 5, 2023            Anay Gu II, DO  02/03/23

## 2023-02-04 ENCOUNTER — APPOINTMENT (OUTPATIENT)
Dept: CT IMAGING | Facility: HOSPITAL | Age: 63
End: 2023-02-04
Payer: COMMERCIAL

## 2023-02-04 ENCOUNTER — APPOINTMENT (OUTPATIENT)
Dept: MRI IMAGING | Facility: HOSPITAL | Age: 63
End: 2023-02-04
Payer: COMMERCIAL

## 2023-02-04 LAB
ABO GROUP BLD: NORMAL
AMMONIA BLD-SCNC: 30 UMOL/L (ref 11–51)
AMPHET+METHAMPHET UR QL: NEGATIVE
AMPHETAMINES UR QL: NEGATIVE
ANION GAP SERPL CALCULATED.3IONS-SCNC: 9 MMOL/L (ref 5–15)
ATMOSPHERIC PRESS: ABNORMAL MM[HG]
B PARAPERT DNA SPEC QL NAA+PROBE: NOT DETECTED
B PERT DNA SPEC QL NAA+PROBE: NOT DETECTED
BARBITURATES UR QL SCN: NEGATIVE
BASE EXCESS BLDV CALC-SCNC: 2.1 MMOL/L (ref -2–2)
BASOPHILS # BLD AUTO: 0.01 10*3/MM3 (ref 0–0.2)
BASOPHILS NFR BLD AUTO: 0.1 % (ref 0–1.5)
BDY SITE: ABNORMAL
BENZODIAZ UR QL SCN: NEGATIVE
BLD GP AB SCN SERPL QL: NEGATIVE
BODY TEMPERATURE: 37 C
BUN SERPL-MCNC: 51 MG/DL (ref 8–23)
BUN/CREAT SERPL: 24.5 (ref 7–25)
BUPRENORPHINE SERPL-MCNC: NEGATIVE NG/ML
C PNEUM DNA NPH QL NAA+NON-PROBE: NOT DETECTED
CALCIUM SPEC-SCNC: 8.3 MG/DL (ref 8.6–10.5)
CANNABINOIDS SERPL QL: POSITIVE
CHLORIDE SERPL-SCNC: 111 MMOL/L (ref 98–107)
CO2 BLDA-SCNC: 29 MMOL/L (ref 22–33)
CO2 SERPL-SCNC: 24 MMOL/L (ref 22–29)
COCAINE UR QL: NEGATIVE
COHGB MFR BLD: 1.2 %
CREAT SERPL-MCNC: 2.08 MG/DL (ref 0.57–1)
D-LACTATE SERPL-SCNC: 0.9 MMOL/L (ref 0.5–2)
DEPRECATED RDW RBC AUTO: 43.1 FL (ref 37–54)
EGFRCR SERPLBLD CKD-EPI 2021: 26.2 ML/MIN/1.73
EOSINOPHIL # BLD AUTO: 0.01 10*3/MM3 (ref 0–0.4)
EOSINOPHIL NFR BLD AUTO: 0.1 % (ref 0.3–6.2)
EPAP: 0
ERYTHROCYTE [DISTWIDTH] IN BLOOD BY AUTOMATED COUNT: 13 % (ref 12.3–15.4)
FLUAV SUBTYP SPEC NAA+PROBE: NOT DETECTED
FLUBV RNA ISLT QL NAA+PROBE: NOT DETECTED
GLUCOSE BLDC GLUCOMTR-MCNC: 109 MG/DL (ref 70–130)
GLUCOSE BLDC GLUCOMTR-MCNC: 116 MG/DL (ref 70–130)
GLUCOSE BLDC GLUCOMTR-MCNC: 124 MG/DL (ref 70–130)
GLUCOSE BLDC GLUCOMTR-MCNC: 144 MG/DL (ref 70–130)
GLUCOSE BLDC GLUCOMTR-MCNC: 153 MG/DL (ref 70–130)
GLUCOSE BLDC GLUCOMTR-MCNC: 317 MG/DL (ref 70–130)
GLUCOSE BLDC GLUCOMTR-MCNC: 76 MG/DL (ref 70–130)
GLUCOSE BLDC GLUCOMTR-MCNC: 94 MG/DL (ref 70–130)
GLUCOSE SERPL-MCNC: 109 MG/DL (ref 65–99)
HADV DNA SPEC NAA+PROBE: NOT DETECTED
HCO3 BLDV-SCNC: 27.6 MMOL/L (ref 22–28)
HCOV 229E RNA SPEC QL NAA+PROBE: NOT DETECTED
HCOV HKU1 RNA SPEC QL NAA+PROBE: NOT DETECTED
HCOV NL63 RNA SPEC QL NAA+PROBE: NOT DETECTED
HCOV OC43 RNA SPEC QL NAA+PROBE: NOT DETECTED
HCT VFR BLD AUTO: 32.8 % (ref 34–46.6)
HGB BLD-MCNC: 11 G/DL (ref 12–15.9)
HGB BLDA-MCNC: 10.9 G/DL (ref 14–18)
HMPV RNA NPH QL NAA+NON-PROBE: NOT DETECTED
HPIV1 RNA ISLT QL NAA+PROBE: NOT DETECTED
HPIV2 RNA SPEC QL NAA+PROBE: NOT DETECTED
HPIV3 RNA NPH QL NAA+PROBE: NOT DETECTED
HPIV4 P GENE NPH QL NAA+PROBE: NOT DETECTED
IMM GRANULOCYTES # BLD AUTO: 0.02 10*3/MM3 (ref 0–0.05)
IMM GRANULOCYTES NFR BLD AUTO: 0.3 % (ref 0–0.5)
INHALED O2 CONCENTRATION: 21 %
IPAP: 0
LYMPHOCYTES # BLD AUTO: 2.25 10*3/MM3 (ref 0.7–3.1)
LYMPHOCYTES NFR BLD AUTO: 30.8 % (ref 19.6–45.3)
M PNEUMO IGG SER IA-ACNC: NOT DETECTED
MCH RBC QN AUTO: 30.4 PG (ref 26.6–33)
MCHC RBC AUTO-ENTMCNC: 33.5 G/DL (ref 31.5–35.7)
MCV RBC AUTO: 90.6 FL (ref 79–97)
METHADONE UR QL SCN: NEGATIVE
METHGB BLD QL: 0.8 %
MODALITY: ABNORMAL
MONOCYTES # BLD AUTO: 0.5 10*3/MM3 (ref 0.1–0.9)
MONOCYTES NFR BLD AUTO: 6.8 % (ref 5–12)
NEUTROPHILS NFR BLD AUTO: 4.52 10*3/MM3 (ref 1.7–7)
NEUTROPHILS NFR BLD AUTO: 61.9 % (ref 42.7–76)
NOTE: ABNORMAL
NRBC BLD AUTO-RTO: 0 /100 WBC (ref 0–0.2)
OPIATES UR QL: NEGATIVE
OXYCODONE UR QL SCN: NEGATIVE
OXYHGB MFR BLDV: 67.3 %
PAW @ PEAK INSP FLOW SETTING VENT: 0 CMH2O
PCO2 BLDV: 46.4 MM HG (ref 41–51)
PCP UR QL SCN: NEGATIVE
PH BLDV: 7.38 PH UNITS (ref 7.31–7.41)
PLATELET # BLD AUTO: 290 10*3/MM3 (ref 140–450)
PMV BLD AUTO: 10.2 FL (ref 6–12)
PO2 BLDV: 36.5 MM HG (ref 27–53)
POTASSIUM SERPL-SCNC: 3.1 MMOL/L (ref 3.5–5.2)
PROCALCITONIN SERPL-MCNC: 0.31 NG/ML (ref 0–0.25)
PROPOXYPH UR QL: NEGATIVE
RBC # BLD AUTO: 3.62 10*6/MM3 (ref 3.77–5.28)
RH BLD: POSITIVE
RHINOVIRUS RNA SPEC NAA+PROBE: NOT DETECTED
RSV RNA NPH QL NAA+NON-PROBE: NOT DETECTED
SARS-COV-2 RNA NPH QL NAA+NON-PROBE: NOT DETECTED
SODIUM SERPL-SCNC: 144 MMOL/L (ref 136–145)
T&S EXPIRATION DATE: NORMAL
TOTAL RATE: 0 BREATHS/MINUTE
TRICYCLICS UR QL SCN: NEGATIVE
TROPONIN T SERPL-MCNC: 0.01 NG/ML (ref 0–0.03)
WBC NRBC COR # BLD: 7.31 10*3/MM3 (ref 3.4–10.8)

## 2023-02-04 PROCEDURE — 82805 BLOOD GASES W/O2 SATURATION: CPT

## 2023-02-04 PROCEDURE — 84145 PROCALCITONIN (PCT): CPT | Performed by: INTERNAL MEDICINE

## 2023-02-04 PROCEDURE — 86850 RBC ANTIBODY SCREEN: CPT | Performed by: INTERNAL MEDICINE

## 2023-02-04 PROCEDURE — 80048 BASIC METABOLIC PNL TOTAL CA: CPT | Performed by: INTERNAL MEDICINE

## 2023-02-04 PROCEDURE — 96361 HYDRATE IV INFUSION ADD-ON: CPT

## 2023-02-04 PROCEDURE — 74176 CT ABD & PELVIS W/O CONTRAST: CPT

## 2023-02-04 PROCEDURE — 71250 CT THORAX DX C-: CPT

## 2023-02-04 PROCEDURE — 70551 MRI BRAIN STEM W/O DYE: CPT

## 2023-02-04 PROCEDURE — 82962 GLUCOSE BLOOD TEST: CPT

## 2023-02-04 PROCEDURE — 25010000002 METOCLOPRAMIDE PER 10 MG: Performed by: INTERNAL MEDICINE

## 2023-02-04 PROCEDURE — 87040 BLOOD CULTURE FOR BACTERIA: CPT | Performed by: INTERNAL MEDICINE

## 2023-02-04 PROCEDURE — 0202U NFCT DS 22 TRGT SARS-COV-2: CPT | Performed by: INTERNAL MEDICINE

## 2023-02-04 PROCEDURE — 70544 MR ANGIOGRAPHY HEAD W/O DYE: CPT

## 2023-02-04 PROCEDURE — 82140 ASSAY OF AMMONIA: CPT | Performed by: INTERNAL MEDICINE

## 2023-02-04 PROCEDURE — 80306 DRUG TEST PRSMV INSTRMNT: CPT | Performed by: INTERNAL MEDICINE

## 2023-02-04 PROCEDURE — 63710000001 INSULIN LISPRO (HUMAN) PER 5 UNITS: Performed by: INTERNAL MEDICINE

## 2023-02-04 PROCEDURE — G0378 HOSPITAL OBSERVATION PER HR: HCPCS

## 2023-02-04 PROCEDURE — 99232 SBSQ HOSP IP/OBS MODERATE 35: CPT | Performed by: STUDENT IN AN ORGANIZED HEALTH CARE EDUCATION/TRAINING PROGRAM

## 2023-02-04 PROCEDURE — 85025 COMPLETE CBC W/AUTO DIFF WBC: CPT | Performed by: INTERNAL MEDICINE

## 2023-02-04 PROCEDURE — 70547 MR ANGIOGRAPHY NECK W/O DYE: CPT

## 2023-02-04 PROCEDURE — 86900 BLOOD TYPING SEROLOGIC ABO: CPT | Performed by: INTERNAL MEDICINE

## 2023-02-04 PROCEDURE — 97535 SELF CARE MNGMENT TRAINING: CPT

## 2023-02-04 PROCEDURE — 96376 TX/PRO/DX INJ SAME DRUG ADON: CPT

## 2023-02-04 PROCEDURE — 86901 BLOOD TYPING SEROLOGIC RH(D): CPT | Performed by: INTERNAL MEDICINE

## 2023-02-04 PROCEDURE — 70450 CT HEAD/BRAIN W/O DYE: CPT

## 2023-02-04 PROCEDURE — 97165 OT EVAL LOW COMPLEX 30 MIN: CPT

## 2023-02-04 PROCEDURE — 63710000001 INSULIN LISPRO (HUMAN) PER 5 UNITS: Performed by: STUDENT IN AN ORGANIZED HEALTH CARE EDUCATION/TRAINING PROGRAM

## 2023-02-04 PROCEDURE — 83605 ASSAY OF LACTIC ACID: CPT | Performed by: NURSE PRACTITIONER

## 2023-02-04 PROCEDURE — 84484 ASSAY OF TROPONIN QUANT: CPT | Performed by: NURSE PRACTITIONER

## 2023-02-04 RX ORDER — INSULIN LISPRO 100 [IU]/ML
2 INJECTION, SOLUTION INTRAVENOUS; SUBCUTANEOUS
Status: DISCONTINUED | OUTPATIENT
Start: 2023-02-04 | End: 2023-02-04

## 2023-02-04 RX ORDER — POTASSIUM CHLORIDE 750 MG/1
40 CAPSULE, EXTENDED RELEASE ORAL ONCE
Status: COMPLETED | OUTPATIENT
Start: 2023-02-04 | End: 2023-02-04

## 2023-02-04 RX ORDER — SODIUM CHLORIDE, SODIUM LACTATE, POTASSIUM CHLORIDE, CALCIUM CHLORIDE 600; 310; 30; 20 MG/100ML; MG/100ML; MG/100ML; MG/100ML
100 INJECTION, SOLUTION INTRAVENOUS CONTINUOUS
Status: DISCONTINUED | OUTPATIENT
Start: 2023-02-04 | End: 2023-02-06 | Stop reason: HOSPADM

## 2023-02-04 RX ORDER — INSULIN LISPRO 100 [IU]/ML
0-7 INJECTION, SOLUTION INTRAVENOUS; SUBCUTANEOUS
Status: DISCONTINUED | OUTPATIENT
Start: 2023-02-04 | End: 2023-02-06 | Stop reason: HOSPADM

## 2023-02-04 RX ORDER — INSULIN LISPRO 100 [IU]/ML
2 INJECTION, SOLUTION INTRAVENOUS; SUBCUTANEOUS ONCE
Status: COMPLETED | OUTPATIENT
Start: 2023-02-04 | End: 2023-02-04

## 2023-02-04 RX ADMIN — PANTOPRAZOLE SODIUM 40 MG: 40 TABLET, DELAYED RELEASE ORAL at 08:25

## 2023-02-04 RX ADMIN — FLUOXETINE 20 MG: 20 CAPSULE ORAL at 08:25

## 2023-02-04 RX ADMIN — INSULIN LISPRO 7 UNITS: 100 INJECTION, SOLUTION INTRAVENOUS; SUBCUTANEOUS at 16:51

## 2023-02-04 RX ADMIN — METOCLOPRAMIDE 5 MG: 5 INJECTION, SOLUTION INTRAMUSCULAR; INTRAVENOUS at 11:30

## 2023-02-04 RX ADMIN — CARVEDILOL 6.25 MG: 6.25 TABLET, FILM COATED ORAL at 21:22

## 2023-02-04 RX ADMIN — CARVEDILOL 6.25 MG: 6.25 TABLET, FILM COATED ORAL at 08:25

## 2023-02-04 RX ADMIN — SODIUM CHLORIDE 1000 ML: 9 INJECTION, SOLUTION INTRAVENOUS at 06:37

## 2023-02-04 RX ADMIN — INSULIN LISPRO 2 UNITS: 100 INJECTION, SOLUTION INTRAVENOUS; SUBCUTANEOUS at 16:51

## 2023-02-04 RX ADMIN — DONEPEZIL HYDROCHLORIDE 5 MG: 5 TABLET, FILM COATED ORAL at 21:22

## 2023-02-04 RX ADMIN — SODIUM CHLORIDE 100 ML/HR: 9 INJECTION, SOLUTION INTRAVENOUS at 04:04

## 2023-02-04 RX ADMIN — SODIUM CHLORIDE, POTASSIUM CHLORIDE, SODIUM LACTATE AND CALCIUM CHLORIDE 100 ML/HR: 600; 310; 30; 20 INJECTION, SOLUTION INTRAVENOUS at 18:02

## 2023-02-04 RX ADMIN — POTASSIUM CHLORIDE 40 MEQ: 750 CAPSULE, EXTENDED RELEASE ORAL at 15:29

## 2023-02-04 RX ADMIN — SODIUM CHLORIDE 500 ML: 9 INJECTION, SOLUTION INTRAVENOUS at 06:37

## 2023-02-04 RX ADMIN — ATORVASTATIN CALCIUM 80 MG: 40 TABLET, FILM COATED ORAL at 21:22

## 2023-02-04 RX ADMIN — ACETAMINOPHEN 325MG 650 MG: 325 TABLET ORAL at 15:04

## 2023-02-04 RX ADMIN — RIVAROXABAN 15 MG: 15 TABLET, FILM COATED ORAL at 17:29

## 2023-02-04 RX ADMIN — TERAZOSIN HYDROCHLORIDE 2 MG: 2 CAPSULE ORAL at 21:22

## 2023-02-04 RX ADMIN — Medication 10 ML: at 21:23

## 2023-02-04 RX ADMIN — METOCLOPRAMIDE 5 MG: 5 INJECTION, SOLUTION INTRAMUSCULAR; INTRAVENOUS at 08:25

## 2023-02-04 RX ADMIN — SODIUM CHLORIDE, POTASSIUM CHLORIDE, SODIUM LACTATE AND CALCIUM CHLORIDE 100 ML/HR: 600; 310; 30; 20 INJECTION, SOLUTION INTRAVENOUS at 08:26

## 2023-02-04 NOTE — NURSING NOTE
Rapid response called on pt as she had experienced a neurological decline from baseline, unable to easily arouse even with painful stimuli. BS: 109, respiratory status at baseline, rapis response quickly changed to code stroke. See note. Pt is back at neurological baseline, will continue to monitor.

## 2023-02-04 NOTE — SIGNIFICANT NOTE
Subjective  rn called rapid response r/t decreased responsiveness.  Pt has had no sedating meds administered.  Low grade temp. Slight decrease in bp from baseline earlier.      Objective  Constitutional: partially arouse with continuous stimuli  HENT:  mucous membranes dry  Respiratory: Clear to auscultation bilaterally, nonlabored respirations   Cardiovascular: RRR, no murmurs,  SR on monitor  Neurologic: Oriented x 3, strength symmetric weak in all extremities    Plan  Code stroke called, stroke care NP evaluated  1.5L bolus  Ammonia  abg  Repeat labs  uds  CT abd/chest/heat without  Checking resp covid pcr  Check rectal temp  Blood cultures      Dr Love present  Matthew DENISE

## 2023-02-04 NOTE — SIGNIFICANT NOTE
Pt found to be unresponsive. Pt would not arouse to voice or sternal rub. Pt appeared to have left facial drooping . Code stroke called. Vital signs and blood sugar checked. Dr. Nelson on unit and assisting with pt care.

## 2023-02-04 NOTE — PROGRESS NOTES
[facility] Medicine Services  PROGRESS NOTE    Patient Name: Thelma Michale  : 1960  MRN: [id number]    Date of Admission: 2/3/2023  Primary Care Physician: Monet Howe APRN    Subjective   Subjective     CC:  N/V    HPI:  Patient had a rapid response called early this morning for decreased responsiveness. Her blood pressure was low with systolic in the 90s.  She was given a fluid bolus and code stroke was called.  CT head concerning for old strokes.    Patient had another code stroke called on .  Patient was very altered and had facial droop.  30 minutes after the event, patient was back to baseline.  Blood sugar was in the 120s during the event.    ROS:  Gen- No fevers, chills  CV- No chest pain, palpitations  Resp- No cough, dyspnea  GI- No diarrhea, abd pain     Objective   Objective     Vital Signs:   Temp:  [97.6 °F (36.4 °C)-99.1 °F (37.3 °C)] 98.3 °F (36.8 °C)  Heart Rate:  [61-91] 73  Resp:  [16-18] 16  BP: ()/() 184/102     Physical Exam:  Constitutional: No acute distress, very sleepy, falls asleep while speaking  HENT: NCAT, mucous membranes moist, poor dentition  Respiratory: Clear to auscultation bilaterally, respiratory effort normal   Cardiovascular: RRR, no murmurs, rubs, or gallops  Gastrointestinal: Positive bowel sounds, soft, nontender, nondistended  Musculoskeletal: No bilateral ankle edema  Neurologic: Oriented x 3, Cranial Nerves grossly intact to confrontation, speech clear  Skin: No rashes on exposed skin, no patches on her skin other than CGM.     Results Reviewed:  LAB RESULTS:      Lab 23  0603 23  0512 23  1210   WBC  --  7.31 10.62   HEMOGLOBIN  --  11.0* 14.8   HEMATOCRIT  --  32.8* 43.2   PLATELETS  --  290 398   NEUTROS ABS  --  4.52 8.40*   IMMATURE GRANS (ABS)  --  0.02 0.05   LYMPHS ABS  --  2.25 1.68   MONOS ABS  --  0.50 0.48   EOS ABS  --  0.01 0.00   MCV  --  90.6 88.2   PROCALCITONIN  --   0.31*  --    LACTATE 0.9  --  1.3         Lab 02/04/23  0512 02/03/23  1210   SODIUM 144 142   POTASSIUM 3.1* 3.1*   CHLORIDE 111* 100   CO2 24.0 25.0   ANION GAP 9.0 17.0*   BUN 51* 56*   CREATININE 2.08* 2.19*   EGFR 26.2* 24.6*   GLUCOSE 109* 169*   CALCIUM 8.3* 9.9         Lab 02/03/23  1210   TOTAL PROTEIN 8.3   ALBUMIN 4.7   GLOBULIN 3.6   ALT (SGPT) 14   AST (SGOT) 24   BILIRUBIN 0.6   ALK PHOS 95   LIPASE 22         Lab 02/04/23  0512   TROPONIN T 0.012                 Lab 02/04/23  0539   FIO2 21   CARBOXYHEMOGLOBIN (VENOUS) 1.2     Brief Urine Lab Results  (Last result in the past 365 days)      Color   Clarity   Blood   Leuk Est   Nitrite   Protein   CREAT   Urine HCG        02/03/23 1332 Yellow   Clear   Small (1+)   Negative   Negative   >=300 mg/dL (3+)                 Microbiology Results Abnormal     Procedure Component Value - Date/Time    COVID PRE-OP / PRE-PROCEDURE SCREENING ORDER (NO ISOLATION) - Swab, Nasopharynx [[id number]]  (Normal) Collected: 02/04/23 0611    Lab Status: Final result Specimen: Swab from Nasopharynx Updated: 02/04/23 0752    Narrative:      The following orders were created for panel order COVID PRE-OP / PRE-PROCEDURE SCREENING ORDER (NO ISOLATION) - Swab, Nasopharynx.  Procedure                               Abnormality         Status                     ---------                               -----------         ------                     Respiratory Panel PCR w/...[[id number]]  Normal              Final result                 Please view results for these tests on the individual orders.    Respiratory Panel PCR w/COVID-19(SARS-CoV-2) NORMA/JUAN ALBERTO/EDY/PAD/COR/MAD/ROSALINA In-House, NP Swab in UTM/VTM, 3-4 HR TAT - Swab, Nasopharynx [[id number]]  (Normal) Collected: 02/04/23 0611    Lab Status: Final result Specimen: Swab from Nasopharynx Updated: 02/04/23 0752     ADENOVIRUS, PCR Not Detected     Coronavirus 229E Not Detected     Coronavirus HKU1 Not Detected     Coronavirus NL63 Not  Detected     Coronavirus OC43 Not Detected     COVID19 Not Detected     Human Metapneumovirus Not Detected     Human Rhinovirus/Enterovirus Not Detected     Influenza A PCR Not Detected     Influenza B PCR Not Detected     Parainfluenza Virus 1 Not Detected     Parainfluenza Virus 2 Not Detected     Parainfluenza Virus 3 Not Detected     Parainfluenza Virus 4 Not Detected     RSV, PCR Not Detected     Bordetella pertussis pcr Not Detected     Bordetella parapertussis PCR Not Detected     Chlamydophila pneumoniae PCR Not Detected     Mycoplasma pneumo by PCR Not Detected    Narrative:      In the setting of a positive respiratory panel with a viral infection PLUS a negative procalcitonin without other underlying concern for bacterial infection, consider observing off antibiotics or discontinuation of antibiotics and continue supportive care. If the respiratory panel is positive for atypical bacterial infection (Bordetella pertussis, Chlamydophila pneumoniae, or Mycoplasma pneumoniae), consider antibiotic de-escalation to target atypical bacterial infection.          CT Abdomen Pelvis Without Contrast    Result Date: 2/4/2023  EXAMINATION: 1.  CT CHEST WO CONTRAST DIAGNOSTIC 2.  CT ABDOMEN PELVIS WO CONTRAST   DATE OF EXAM: 2/4/2023 6:18 AM HISTORY: Sepsis, unclear source. COMPARISON: CT abdomen/pelvis without contrast dated 9/20/2022. TECHNIQUE: CT examination of the chest, abdomen and pelvis was performed without intravenous contrast. Axial, coronal, and sagittal images provided for review. CT dose lowering techniques were used, to include: automated exposure control, adjustment for patient size, and/or use of iterative reconstruction. Note: The exam is limited because some types of pathology may not be adequately demonstrated due to lack of contrast enhancement. FINDINGS: CHEST: Lungs:  Mild biapical scarring. Very mild subpleural emphysematous changes towards the apices. No suspicious consolidation. Subpleural 3  mm calcified granuloma at the left lower lobe. Pleura: Normal. Cardiovascular: Normal cardiac size. No pericardial effusion. Mild-to-moderate atherosclerotic calcifications of the aortic arch. Mild coronary artery calcifications. Mediastinum And Mari: Small left perihilar calcifications could reflect small calcified lymph nodes. No pathologically enlarged lymph nodes identified in the chest. Hypodense nodule measuring 1.4 cm in the right adrenal gland. Chest Wall:  Normal. Chest Musculoskeletal: Reverse S shaped scoliotic curvature of the cervicothoracic spine. Possible osteopenia. No acute osseous abnormality identified. ABDOMEN/PELVIS: Liver: Normal. Gallbladder/Biliary: Question faint hyperdensity which could reflect sludge or layering noncalcified stones. Pancreas: Normal. Spleen: Normal. Adrenal Glands: Stable low density nodularity and bilateral adrenal glands suggests adenomas or hyperplasia. Kidneys: Normal. Bladder: Urinary bladder demonstrates prominent trabeculated wall. A prominent outpouching is present at the bladder dome compatible with relatively wide mouth diverticulum which measures 4.9 cm in the axial plane. Appearance of the urinary bladder is similar to prior exam. Mesentery/Peritoneum: No free air or free fluid. GI Tract: No bowel obstruction. Appendix is normal. Scattered colonic diverticula without obvious focal diverticulitis. Diverticula are most numerous at the sigmoid colon. Many portions of the colon are underdistended limiting evaluation. Reproductive: Normal. Lymph Nodes: Normal. Vasculature: Moderate aortobiiliac atherosclerotic calcifications. Normal caliber of aorta and IVC. Abdominal Wall: Normal. Abdomen Musculoskeletal: Possible osteopenia. Degenerative changes of the lumbosacral junction. No acute osseous abnormality.     Impression: 1.  No acute pathology identified on noncontrast evaluation of the chest, abdomen, or pelvis. 2.  Possible gallbladder sludge or layering  noncalcified stones. 3.  Colonic diverticulosis without evidence of diverticulitis. 4.  Urinary bladder wall is prominent and trabeculated. Prominent diverticula at bladder dome. Findings are unchanged from prior exam. Correlate with patient history. Potentially related to chronic urinary retention or cystitis. 5.  Stable low density bilateral adrenal nodularity suggests adenomas or hyperplasia. 6.  Evidence of prior granulomatous disease in the left chest. 7.  Low density right thyroid gland nodule. Nonemergent thyroid ultrasound can be obtained for further evaluation as clinically indicated. 8.  Other findings as above. Electronically signed by:  Pedro Blum D.O.  2/4/2023 5:30 AM Mountain Time    CT Chest Without Contrast Diagnostic    Result Date: 2/4/2023  EXAMINATION: 1.  CT CHEST WO CONTRAST DIAGNOSTIC 2.  CT ABDOMEN PELVIS WO CONTRAST   DATE OF EXAM: 2/4/2023 6:18 AM HISTORY: Sepsis, unclear source. COMPARISON: CT abdomen/pelvis without contrast dated 9/20/2022. TECHNIQUE: CT examination of the chest, abdomen and pelvis was performed without intravenous contrast. Axial, coronal, and sagittal images provided for review. CT dose lowering techniques were used, to include: automated exposure control, adjustment for patient size, and/or use of iterative reconstruction. Note: The exam is limited because some types of pathology may not be adequately demonstrated due to lack of contrast enhancement. FINDINGS: CHEST: Lungs:  Mild biapical scarring. Very mild subpleural emphysematous changes towards the apices. No suspicious consolidation. Subpleural 3 mm calcified granuloma at the left lower lobe. Pleura: Normal. Cardiovascular: Normal cardiac size. No pericardial effusion. Mild-to-moderate atherosclerotic calcifications of the aortic arch. Mild coronary artery calcifications. Mediastinum And Mari: Small left perihilar calcifications could reflect small calcified lymph nodes. No pathologically enlarged lymph nodes  identified in the chest. Hypodense nodule measuring 1.4 cm in the right adrenal gland. Chest Wall:  Normal. Chest Musculoskeletal: Reverse S shaped scoliotic curvature of the cervicothoracic spine. Possible osteopenia. No acute osseous abnormality identified. ABDOMEN/PELVIS: Liver: Normal. Gallbladder/Biliary: Question faint hyperdensity which could reflect sludge or layering noncalcified stones. Pancreas: Normal. Spleen: Normal. Adrenal Glands: Stable low density nodularity and bilateral adrenal glands suggests adenomas or hyperplasia. Kidneys: Normal. Bladder: Urinary bladder demonstrates prominent trabeculated wall. A prominent outpouching is present at the bladder dome compatible with relatively wide mouth diverticulum which measures 4.9 cm in the axial plane. Appearance of the urinary bladder is similar to prior exam. Mesentery/Peritoneum: No free air or free fluid. GI Tract: No bowel obstruction. Appendix is normal. Scattered colonic diverticula without obvious focal diverticulitis. Diverticula are most numerous at the sigmoid colon. Many portions of the colon are underdistended limiting evaluation. Reproductive: Normal. Lymph Nodes: Normal. Vasculature: Moderate aortobiiliac atherosclerotic calcifications. Normal caliber of aorta and IVC. Abdominal Wall: Normal. Abdomen Musculoskeletal: Possible osteopenia. Degenerative changes of the lumbosacral junction. No acute osseous abnormality.     Impression: 1.  No acute pathology identified on noncontrast evaluation of the chest, abdomen, or pelvis. 2.  Possible gallbladder sludge or layering noncalcified stones. 3.  Colonic diverticulosis without evidence of diverticulitis. 4.  Urinary bladder wall is prominent and trabeculated. Prominent diverticula at bladder dome. Findings are unchanged from prior exam. Correlate with patient history. Potentially related to chronic urinary retention or cystitis. 5.  Stable low density bilateral adrenal nodularity suggests  adenomas or hyperplasia. 6.  Evidence of prior granulomatous disease in the left chest. 7.  Low density right thyroid gland nodule. Nonemergent thyroid ultrasound can be obtained for further evaluation as clinically indicated. 8.  Other findings as above. Electronically signed by:  Pedro Blum D.O.  2/4/2023 5:30 AM Mountain Time    CT Head Without Contrast Stroke Protocol    Result Date: 2/4/2023  EXAMINATION:  CT HEAD WO CONTRAST STROKE PROTOCOL DATE: 2/4/2023 6:14 AM  INDICATION:  Altered mental status.  COMPARISON: Head CT October 31, 2019.  TECHNIQUE:  Routine axial images through the head without contrast. Low-dose CT acquisition technique included one or more of the following options: 1. Automated exposure control, 2. Adjustment of mA and/or KV according to patient's size and/or 3. Use of iterative reconstruction. FINDINGS:  Intracranial Contents: Mild volume loss. Chronic infarcts in the left cerebellum and right basal ganglia. Confluent hypodensities in the periventricular white matter, most consistent with chronic small vessel ischemic changes. No acute intracranial hemorrhage. No mass effect, midline shift, hydrocephalus, herniation, or extra axial fluid collection.  Bones and Extracranial Soft Tissues: The calvarium is intact. Normal extracranial soft tissues. Minimal mucosal thickening ethmoid air cells. The remaining paranasal sinuses and mastoid air cells are well aerated. No acute abnormality of the orbits or globes. Distal internal carotid artery atherosclerotic calcifications.     Impression: 1. No acute intracranial abnormality. MRI is more sensitive for evaluation of acute ischemia within the first 24 to 48 hours. 2. Volume loss. Moderate to severe chronic small vessel ischemic changes. Chronic infarct in the right lentiform nucleus which is at least new compared to the head CT in 2019. Chronic left cerebellar lacunar infarct. I communicated these results by phone to  Mariajose Awad at 4:36 AM  Mountain time on 2/4/2023. The results were communicated back and were said to be understood. Electronically signed by:  Terry Lizama M.D.  2/4/2023 4:53 AM Mountain Time      Results for orders placed during the hospital encounter of 12/06/22    Adult Transthoracic Echo Complete W/ Cont if Necessary Per Protocol    Interpretation Summary  •  Left ventricular ejection fraction appears to be greater than 70%.  •  Left ventricular wall thickness is consistent with moderate concentric hypertrophy.  •  Left ventricular diastolic function is consistent with (grade I) impaired relaxation.  •  Estimated right ventricular systolic pressure from tricuspid regurgitation is mildly elevated (35-45 mmHg). Calculated right ventricular systolic pressure from tricuspid regurgitation is 39 mmHg.      I have reviewed the medications:  Scheduled Meds:atorvastatin, 80 mg, Oral, Nightly  carvedilol, 6.25 mg, Oral, BID  donepezil, 5 mg, Oral, Nightly  FLUoxetine, 20 mg, Oral, Daily  gabapentin, 400 mg, Oral, Nightly  insulin lispro, 0-9 Units, Subcutaneous, TID AC  metoclopramide, 5 mg, Intravenous, 4x Daily AC & at Bedtime  mirtazapine, 15 mg, Oral, Nightly  pantoprazole, 40 mg, Oral, Daily  rivaroxaban, 15 mg, Oral, Daily With Dinner  sodium chloride, 10 mL, Intravenous, Q12H  terazosin, 2 mg, Oral, Nightly      Continuous Infusions:sodium chloride, 100 mL/hr, Last Rate: 100 mL/hr (02/04/23 0404)      PRN Meds:.•  acetaminophen  •  albuterol  •  dextrose  •  dextrose  •  glucagon (human recombinant)  •  hydrALAZINE  •  influenza vaccine  •  melatonin  •  prochlorperazine **OR** prochlorperazine **OR** prochlorperazine  •  Sodium Chloride (PF)  •  sodium chloride  •  sodium chloride    Assessment & Plan   Assessment & Plan     Active Hospital Problems    Diagnosis  POA   • **Volume depletion [E86.9]  Yes   • Hypokalemia [E87.6]  Yes   • Gastroparesis [K31.84]  Yes   • Uncontrolled type 1 diabetes mellitus with hyperglycemia (HCC)  [E10.65]  Yes   • Hypertensive urgency [I16.0]  Yes   • RAFAT (acute kidney injury) (HCC) [N17.9]  Yes      Resolved Hospital Problems   No resolved problems to display.        Brief Hospital Course to date:  Thelma Michael is a 62 y.o. female with uncontrolled diabetes and gastroparesis presenting with intractable n/v.     Intractable nausea and vomiting  Diabetic gastroparesis  Dehydration  --Based on her prior admissions, started on Reglan and compazine.  --IVF.  --CLD, adv as tolerated.    Altered mental status  --Had a second code stroke called on her midday today  --Stroke team evaluated  --MRI ordered  --EEG pending  --General neurology to see  --Suspect could be related to reglan use given age, worsened renal function      RAFAT  --Due to above while using losartan. Hold losartan and hydrate as above.  --BMP in am.     HypoK  --s/p 40meq once and recheck in am.     Hypertensive urgency  --Due to inability to tolerate oral meds. Holding losartan. Will resume her prior dosing of coreg.   --IV hydralzine prn.     Uncontrolled type 1 DM  --Uses insulin pump at home. Can use pump here along with SSI. Pump removed during her second altered episode and inability to manage the pump. Transitioned to basal/bolus insulin. 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 Home Xarelto.     DVT prophylaxis:  Xarelto    Expected Discharge Location and Transportation: home  Expected Discharge   Expected Discharge Date  and Time     Expected Discharge Date Expected Discharge Time    Feb 5, 2023            DVT prophylaxis:  Medical and mechanical DVT prophylaxis orders are present.          CODE STATUS:   Code Status and Medical Interventions:   Ordered at: 02/03/23 1338     Code Status (Patient has no pulse and is not breathing):    CPR (Attempt to Resuscitate)     Medical Interventions (Patient has pulse or is breathing):    Full Support       Fatih Nelson MD  02/04/23

## 2023-02-04 NOTE — SIGNIFICANT NOTE
Code stroke was called regarding patient lethargy. RN states that at beginning of night she was easy to engage. She slept throughout the night. When doing 0500 vitals and check she was minimally responsive. Of note, her blood pressure was 92/62. Earlier in the evening patient was in 200's systolically. I have requested fluid bolus. Initially patient was difficult to arouse but over the course of approximatelyh 5 minutes became more alert and participated in exam fully. No focal deficits. NIH 1. Will not do full code stroke work up. Patient is febrile with nausea. Has not been tested for covid. Dr. Love at bedside. Both agree for infectious work up. CT abdomen, CT head w/o. Patient has renal disease and due to rapid improvement will defer dumont. If patient had further neurologic issues arise will consider more work up. She has returned back to her baseline.        1a. Level of Consciousness: 1-->Not alert, but arousable by minor stimulation to obey, answer, or respond  1b. LOC Questions: 0-->Answers both questions correctly  1c. LOC Commands: 0-->Performs both tasks correctly  2. Best Gaze: 0-->Normal  3. Visual: 0-->No visual loss  4. Facial Palsy: 0-->Normal symmetrical movements  5a. Motor Arm, Left: 0-->No drift, limb holds 90 (or 45) degrees for full 10 secs  5b. Motor Arm, Right: 0-->No drift, limb holds 90 (or 45) degrees for full 10 secs  6a. Motor Leg, Left: 0-->No drift, leg holds 30 degree position for full 5 secs  6b. Motor Leg, Right: 0-->No drift, leg holds 30 degree position for full 5 secs  7. Limb Ataxia: 0-->Absent  8. Sensory: 0-->Normal, no sensory loss  9. Best Language: 0-->No aphasia, normal  10. Dysarthria: 0-->Normal  11. Extinction and Inattention (formerly Neglect): 0-->No abnormality    Total (NIH Stroke Scale): 1

## 2023-02-04 NOTE — SIGNIFICANT NOTE
Code stroke called for decreased LOC and questionable left facial droop.  Patient does respond to sternal rub.  She does answer questions with continued stimulation.  No unilateral weakness noted.  No facial droop noted.  Moves all extremities equally.  Follows simple commands with repeated stimulation.    Interval: baseline  1a. Level of Consciousness: 1-->Not alert, but arousable by minor stimulation to obey, answer, or respond  1b. LOC Questions: 2-->Answers neither question correctly  1c. LOC Commands: 0-->Performs both tasks correctly  2. Best Gaze: 0-->Normal  3. Visual: 0-->No visual loss  4. Facial Palsy: 0-->Normal symmetrical movements  5a. Motor Arm, Left: 1-->Drift, limb holds 90 (or 45) degrees, but drifts down before full 10 seconds, does not hit bed or other support  5b. Motor Arm, Right: 1-->Drift, limb holds 90 (or 45) degrees, but drifts down before full 10 secs, does not hit bed or other support  6a. Motor Leg, Left: 1-->Drift, leg falls by the end of the 5-sec period but does not hit bed  6b. Motor Leg, Right: 1-->Drift, leg falls by the end of the 5-sec period but does not hit bed  7. Limb Ataxia: 0-->Absent  8. Sensory: 0-->Normal, no sensory loss  9. Best Language: 1-->Mild-to-moderate aphasia, some obvious loss of fluency or facility of comprehension, without significant limitation on ideas expressed or form of expression. Reduction of speech and/or comprehension, however, makes conversation. . . (see row details)  10. Dysarthria: 1-->Mild-to-moderate dysarthria, patient slurs at least some words and, at worst, can be understood with some difficulty  11. Extinction and Inattention (formerly Neglect): 0-->No abnormality    Total (NIH Stroke Scale): 9    Patient discussed with Dr. Nelson, Dr. Contreras, and Dr. Khan.  Stat MRI brain and MRA H/N ordered to rule out stroke.  Recurrent episodes of AMS followed by return to baseline and are suspicious for possible seizure activity.  EEG ordered  for the jay Khan with general neurology to follow.    Please call with any questions or concerns.    HOWIE Vee, [facility] Center[facility]-BC

## 2023-02-04 NOTE — PLAN OF CARE
Goal Outcome Evaluation:  Plan of Care Reviewed With: patient           Outcome Evaluation: OT evaluation complete.  Pt noted with weakness and decreased activity sveta.  Son had to pack her down 20 steps (steps she has to enter and exit home) and her baseline is indep in mobility.  She is cga for fxl mob, sba for sts, setup for grooming and lbd.  Recommend IPOT and HHOT at d/c.

## 2023-02-04 NOTE — PLAN OF CARE
Goal Outcome Evaluation:  Plan of Care Reviewed With: patient           Outcome Evaluation: Pt fully A/O since episode this morning. MRI completed. Neurology consulted. EEG ordered. Pt daughter to bring new supplies to reapply insulin pump. Pt will remain on scheduled insulin per MAR per Dr. Nelson until pump is placed back on pt. One time dose of 40 meq of K+ given this shift for a K+ of 3.1.

## 2023-02-04 NOTE — THERAPY EVALUATION
Patient Name: Thelma Michael  : 1960    MRN: [id number]                              Today's Date: 2023       Admit Date: 2/3/2023    Visit Dx:     ICD-10-CM ICD-9-CM   1. Volume depletion  E86.9 276.50   2. Acute renal failure, unspecified acute renal failure type (HCC)  N17.9 584.9   3. Gastroparesis  K31.84 536.3   4. Acute abdominal pain  R10.9 789.00     338.19     Patient Active Problem List   Diagnosis   • Gastroesophageal reflux disease   • Diabetic peripheral neuropathy (HCC)   • Hyperlipidemia   • Hypertension   • Vitamin D deficiency   • Osteoporosis   • Tobacco use   • Heart valve disease   • History of sepsis   • Migraines   • Depression with anxiety   • Primary insomnia   • Iron deficiency anemia secondary to inadequate dietary iron intake   • RAFAT (acute kidney injury) ([facility])   • Hypertensive urgency   • Uncontrolled type 1 diabetes mellitus with hyperglycemia ([facility])   • History of TIAs   • Gastroparesis   • Hypokalemia   • Elevated serum protein level   • Moderate malnutrition (CMS/HCC)   • PFO (patent foramen ovale)   • Atrial fibrillation and flutter (HCC)   • Volume depletion     Past Medical History:   Diagnosis Date   • Acid reflux    • Acute bronchitis    • Cardiac murmur    • Diabetes mellitus ([facility])    • H/O echocardiogram 2012    i. LVEF 65%.ii. Mild LVH.iii. Borderline evidence of atrial septal aneurysm.  No PFO.    • History of nuclear stress test 2014    Negative for ischemia and scars; LVEF 77%.     • Hyperlipidemia    • Hypertension    • Impacted cerumen of both ears    • Migraine    • Self-catheterizes urinary bladder    • Sinusitis    • Stroke ([facility])    • Tobacco abuse     quit 4 days ago.     • Urticaria      Past Surgical History:   Procedure Laterality Date   • CAPSULE ENDOSCOPY  2021    Procedure: PILLCAM DEPLOYMENT;  Surgeon: Mikael Worthy MD;  Location: [facility] END[facility]COPY;  Service: Gastroenterology;;   • COLONOSCOPY     • COLONOSCOPY N/A  07/27/2021    Procedure: COLONOSCOPY;  Surgeon: Mikael Worthy MD;  Location:  [facility];  Service: Gastroenterology;  Laterality: N/A;   • DENTAL PROCEDURE     • ENDOSCOPY N/A 06/30/2021    Procedure: ESOPHAGOGASTRODUODENOSCOPY;  Surgeon: Brunner, Mark I, MD;  Location:  [facility];  Service: Gastroenterology;  Laterality: N/A;   • ENDOSCOPY N/A 07/27/2021    Procedure: ESOPHAGOGASTRODUODENOSCOPY;  Surgeon: Mikael Worthy MD;  Location:  [facility];  Service: Gastroenterology;  Laterality: N/A;   • ENDOSCOPY WITH JTUBE N/A 03/16/2022    Procedure: ESOPHAGOGASTRODUODENOSCOPY WITH JEJUNAL TUBE INSERTION;  Surgeon: Thom Glover MD;  Location:  [facility];  Service: Gastroenterology;  Laterality: N/A;   • NO PAST SURGERIES     • UPPER GASTROINTESTINAL ENDOSCOPY        General Information     Row Name 02/04/23 0808          OT Time and Intention    Document Type evaluation  -SW     Mode of Treatment occupational therapy  -     Row Name 02/04/23 0808          General Information    Patient Profile Reviewed yes  -SW     Prior Level of Function independent:;all household mobility;community mobility;ADL's;driving  -SW     Existing Precautions/Restrictions fall  -SW     Barriers to Rehab none identified  -     Row Name 02/04/23 0808          Occupational Profile    Environmental Supports and Barriers (Occupational Profile) Has a walker and cane but does not use.  -     Row Name 02/04/23 0808          Living Environment    People in Home child(bronson), adult  -     Row Name 02/04/23 0808          Home Main Entrance    Number of Stairs, Main Entrance other (see comments)  20  -     Row Name 02/04/23 0808          Stairs Within Home, Primary    Number of Stairs, Within Home, Primary none  -     Row Name 02/04/23 0808          Cognition    Orientation Status (Cognition) oriented x 4  -     Row Name 02/04/23 0808          Safety Issues, Functional Mobility    Impairments Affecting  Function (Mobility) endurance/activity tolerance;strength  -           User Key  (r) = Recorded By, (t) = Taken By, (c) = Cosigned By    Initials Name Provider Type    Amaya Valladares OT Occupational Therapist                 Mobility/ADL's     Row Name 02/04/23 0808          Bed Mobility    Bed Mobility supine-sit;sit-supine  -SW     Supine-Sit Ada (Bed Mobility) set up  -SW     Sit-Supine Ada (Bed Mobility) set up  -SW     Assistive Device (Bed Mobility) bed rails;head of bed elevated  -[facility] Name 02/04/23 0808          Transfers    Transfers sit-stand transfer  -[facility] Name 02/04/23 0808          Sit-Stand Transfer    Sit-Stand Ada (Transfers) standby assist  -[facility] Name 02/04/23 0808          Functional Mobility    Functional Mobility- Ind. Level contact guard assist  -     Functional Mobility- Device other (see comments)  HHA  -     Functional Mobility-Distance (Feet) 25  -     Functional Mobility- Safety Issues step length decreased  -[facility] Name 02/04/23 0808          Activities of Daily Living    BADL Assessment/Intervention lower body dressing;grooming  -[facility] Name 02/04/23 0808          Lower Body Dressing Assessment/Training    Ada Level (Lower Body Dressing) lower body dressing skills;socks;set up  -SW     Position (Lower Body Dressing) edge of bed sitting  -[facility] Name 02/04/23 0808          Grooming Assessment/Training    Ada Level (Grooming) grooming skills;wash face, hands;set up  -SW     Position (Grooming) edge of bed sitting  -           User Key  (r) = Recorded By, (t) = Taken By, (c) = Cosigned By    Initials Name Provider Type    Amaya Valladares OT Occupational Therapist               Obj/Interventions     Row Name 02/04/23 0808          Sensory Assessment (Somatosensory)    Sensory Assessment (Somatosensory) UE sensation intact  -[facility] Name 02/04/23 0808          Vision Assessment/Intervention    Visual  Impairment/Limitations WFL  -     Row Name 02/04/23 0808          Range of Motion Comprehensive    General Range of Motion bilateral upper extremity ROM L  -[facility] Name 02/04/23 0808          Strength Comprehensive (MMT)    General Manual Muscle Testing (MMT) Assessment upper extremity strength deficits identified  -     Comment, General Manual Muscle Testing (MMT) Assessment BUEs 4/5  -     Row Name 02/04/23 0808          Balance    Balance Assessment sitting static balance;sit to stand dynamic balance;sitting dynamic balance;standing static balance;standing dynamic balance  -     Static Sitting Balance set-up  -     Dynamic Sitting Balance set-up  -     Position, Sitting Balance unsupported  -     Sit to Stand Dynamic Balance standby assist  -     Static Standing Balance standby assist  -     Dynamic Standing Balance contact guard  -     Position/Device Used, Standing Balance supported;other (see comments)  [facility]  -     Balance Interventions sitting;standing;sit to stand;supported;static;dynamic;minimal challenge;occupation based/functional task  -           User Key  (r) = Recorded By, (t) = Taken By, (c) = Cosigned By    Initials Name Provider Type     Amaya Sainz OT Occupational Therapist               Goals/Plan     [facility] Name 02/04/23 0808          Transfer Goal 1 (OT)    Activity/Assistive Device (Transfer Goal 1, OT) toilet  -     [place] Level/Cues Needed (Transfer Goal 1, OT) modified independence  -     Time Frame (Transfer Goal 1, OT) long term goal (LTG);by discharge  -     Progress/Outcome (Transfer Goal 1, OT) goal ongoing  -[facility] Name 02/04/23 0808          Problem Specific Goal 1 (OT)    Problem Specific Goal 1 (OT) Pt will demo increased activity sveta to stand times 10 minutes performing adls with no lob.  -     Time Frame (Problem Specific Goal 1, OT) long term goal (LTG);by discharge  -     Progress/Outcome (Problem Specific Goal 1, OT) goal  ongoing  -[facility] Name 02/04/23 0808          Therapy Assessment/Plan (OT)    Planned Therapy Interventions (OT) activity tolerance training;adaptive equipment training;BADL retraining;functional balance retraining;transfer/mobility retraining;strengthening exercise;patient/caregiver education/training  -           User Key  (r) = Recorded By, (t) = Taken By, (c) = Cosigned By    Initials Name Provider Type    Amaya Valladares OT Occupational Therapist               Clinical Impression     Row Name 02/04/23 0808          Pain Assessment    Pretreatment Pain Rating 0/10 - no pain  -     Posttreatment Pain Rating 0/10 - no pain  -SW     Row Name 02/04/23 0808          Plan of Care Review    Plan of Care Reviewed With patient  -     Outcome Evaluation OT evaluation complete.  Pt noted with weakness and decreased activity sveta.  Son had to pack her down 20 steps (steps she has to enter and exit home) and her baseline is indep in mobility.  She is cga for fxl mob, sba for sts, setup for grooming and lbd.  Recommend IPOT and HHOT at d/c.  -[facility] 02/04/23 0808          Therapy Assessment/Plan (OT)    Rehab Potential (OT) good, to achieve stated therapy goals  -     Criteria for Skilled Therapeutic Interventions Met (OT) yes;meets criteria;skilled treatment is necessary  -     Therapy Frequency (OT) daily  -SW     Row Name 02/04/23 0808          Therapy Plan Review/Discharge Plan (OT)    Anticipated Discharge Disposition (OT) home with assist;home with home health  -[facility] Name 02/04/23 0808          Vital Signs    Pre Systolic BP Rehab 165  -SW     Pre Treatment Diastolic BP 89  -SW     Pretreatment Heart Rate (beats/min) 72  -SW     Pre SpO2 (%) 99  -SW     O2 Delivery Pre Treatment room air  -SW     O2 Delivery Intra Treatment room air  -SW     O2 Delivery Post Treatment room air  -SW     Pre Patient Position Supine  -SW     Intra Patient Position Standing  -SW     Post Patient Position Sitting   -     Row Name 02/04/23 0808          Positioning and Restraints    Pre-Treatment Position in bed  -SW     Post Treatment Position bed  -SW     In Bed notified nsg;sitting;sitting EOB;call light within reach;encouraged to call for assist;with nsg;side rails up x2  -           User Key  (r) = Recorded By, (t) = Taken By, (c) = Cosigned By    Initials Name Provider Type    Amaya Valladares OT Occupational Therapist               Outcome Measures     Row Name 02/04/23 0852          How much help from another is currently needed...    Putting on and taking off regular lower body clothing? 4  -SW     Bathing (including washing, rinsing, and drying) 3  -SW     Toileting (which includes using toilet bed pan or urinal) 3  -SW     Putting on and taking off regular upper body clothing 4  -SW     Taking care of personal grooming (such as brushing teeth) 4  -SW     Eating meals 4  -SW     AM-PAC 6 Clicks Score (OT) 22  -     Row Name 02/04/23 0852          Functional Assessment    Outcome Measure Options AM-PAC 6 Clicks Daily Activity (OT)  -           User Key  (r) = Recorded By, (t) = Taken By, (c) = Cosigned By    Initials Name Provider Type    Amaya Valladares OT Occupational Therapist                Occupational Therapy Education     Title: PT OT SLP Therapies (In Progress)     Topic: Occupational Therapy (In Progress)     Point: ADL training (Done)     Description:   Instruct learner(s) on proper safety adaptation and remediation techniques during self care or transfers.   Instruct in proper use of assistive devices.              Learning Progress Summary           Patient Acceptance, E, VU by LUANA at 2/4/2023 0853                   Point: Home exercise program (Not Started)     Description:   Instruct learner(s) on appropriate technique for monitoring, assisting and/or progressing therapeutic exercises/activities.              Learner Progress:  Not documented in this visit.          Point: Precautions (Done)      Description:   Instruct learner(s) on prescribed precautions during self-care and functional transfers.              Learning Progress Summary           Patient Acceptance, E, VU by LUANA at 2/4/2023 0853                   Point: Body mechanics (Not Started)     Description:   Instruct learner(s) on proper positioning and spine alignment during self-care, functional mobility activities and/or exercises.              Learner Progress:  Not documented in this visit.                      User Key     Initials Effective Dates Name Provider Type Discipline     06/16/21 -  Amaya Sainz OT Occupational Therapist OT              OT Recommendation and Plan  Planned Therapy Interventions (OT): activity tolerance training, adaptive equipment training, BADL retraining, functional balance retraining, transfer/mobility retraining, strengthening exercise, patient/caregiver education/training  Therapy Frequency (OT): daily  Plan of Care Review  Plan of Care Reviewed With: patient  Outcome Evaluation: OT evaluation complete.  Pt noted with weakness and decreased activity sveta.  Son had to pack her down 20 steps (steps she has to enter and exit home) and her baseline is indep in mobility.  She is cga for fxl mob, sba for sts, setup for grooming and lbd.  Recommend IPOT and HHOT at d/c.     Time Calculation:    Time Calculation- OT     Row Name 02/04/23 0808             Time Calculation- OT    OT Start Time 0808  -SW      OT Received On 02/04/23  -      OT Goal Re-Cert Due Date 02/14/23  -         Timed Charges    10730 - OT Self Care/Mgmt Minutes 10  -SW         Untimed Charges    OT Eval/Re-eval Minutes 40  -SW         Total Minutes    Timed Charges Total Minutes 10  -SW      Untimed Charges Total Minutes 40  -SW       Total Minutes 50  -SW            User Key  (r) = Recorded By, (t) = Taken By, (c) = Cosigned By    Initials Name Provider Type     Amaya Sainz OT Occupational Therapist              Therapy Charges for Today      Code Description Service Date Service Provider Modifiers Qty    75367241012 HC OT SELF CARE/MGMT/TRAIN EA 15 MIN 2/4/2023 Amaya Sainz OT GO 1    [id number]  OT EVAL LOW COMPLEXITY 3 2/4/2023 Amaya Sainz OT GO 1               Amaya Sainz OT  2/4/2023

## 2023-02-05 ENCOUNTER — APPOINTMENT (OUTPATIENT)
Dept: NEUROLOGY | Facility: HOSPITAL | Age: 63
End: 2023-02-05
Payer: COMMERCIAL

## 2023-02-05 LAB
ALBUMIN SERPL-MCNC: 3.3 G/DL (ref 3.5–5.2)
ALBUMIN/GLOB SERPL: 1.4 G/DL
ALP SERPL-CCNC: 59 U/L (ref 39–117)
ALT SERPL W P-5'-P-CCNC: 10 U/L (ref 1–33)
ANION GAP SERPL CALCULATED.3IONS-SCNC: 12 MMOL/L (ref 5–15)
AST SERPL-CCNC: 15 U/L (ref 1–32)
BILIRUB SERPL-MCNC: 0.5 MG/DL (ref 0–1.2)
BUN SERPL-MCNC: 20 MG/DL (ref 8–23)
BUN/CREAT SERPL: 19.8 (ref 7–25)
CALCIUM SPEC-SCNC: 8.2 MG/DL (ref 8.6–10.5)
CHLORIDE SERPL-SCNC: 109 MMOL/L (ref 98–107)
CO2 SERPL-SCNC: 20 MMOL/L (ref 22–29)
CREAT SERPL-MCNC: 1.01 MG/DL (ref 0.57–1)
DEPRECATED RDW RBC AUTO: 44.3 FL (ref 37–54)
EGFRCR SERPLBLD CKD-EPI 2021: 62.3 ML/MIN/1.73
ERYTHROCYTE [DISTWIDTH] IN BLOOD BY AUTOMATED COUNT: 13.2 % (ref 12.3–15.4)
GLOBULIN UR ELPH-MCNC: 2.3 GM/DL
GLUCOSE BLDC GLUCOMTR-MCNC: 123 MG/DL (ref 70–130)
GLUCOSE BLDC GLUCOMTR-MCNC: 127 MG/DL (ref 70–130)
GLUCOSE BLDC GLUCOMTR-MCNC: 134 MG/DL (ref 70–130)
GLUCOSE BLDC GLUCOMTR-MCNC: 134 MG/DL (ref 70–130)
GLUCOSE BLDC GLUCOMTR-MCNC: 146 MG/DL (ref 70–130)
GLUCOSE BLDC GLUCOMTR-MCNC: 150 MG/DL (ref 70–130)
GLUCOSE BLDC GLUCOMTR-MCNC: 306 MG/DL (ref 70–130)
GLUCOSE BLDC GLUCOMTR-MCNC: 41 MG/DL (ref 70–130)
GLUCOSE BLDC GLUCOMTR-MCNC: 50 MG/DL (ref 70–130)
GLUCOSE SERPL-MCNC: 157 MG/DL (ref 65–99)
HCT VFR BLD AUTO: 33.2 % (ref 34–46.6)
HGB BLD-MCNC: 10.9 G/DL (ref 12–15.9)
MCH RBC QN AUTO: 30.4 PG (ref 26.6–33)
MCHC RBC AUTO-ENTMCNC: 32.8 G/DL (ref 31.5–35.7)
MCV RBC AUTO: 92.5 FL (ref 79–97)
PLATELET # BLD AUTO: 242 10*3/MM3 (ref 140–450)
PMV BLD AUTO: 10.3 FL (ref 6–12)
POTASSIUM SERPL-SCNC: 3.1 MMOL/L (ref 3.5–5.2)
PROT SERPL-MCNC: 5.6 G/DL (ref 6–8.5)
RBC # BLD AUTO: 3.59 10*6/MM3 (ref 3.77–5.28)
SODIUM SERPL-SCNC: 141 MMOL/L (ref 136–145)
WBC NRBC COR # BLD: 5.78 10*3/MM3 (ref 3.4–10.8)

## 2023-02-05 PROCEDURE — 85027 COMPLETE CBC AUTOMATED: CPT | Performed by: STUDENT IN AN ORGANIZED HEALTH CARE EDUCATION/TRAINING PROGRAM

## 2023-02-05 PROCEDURE — 82962 GLUCOSE BLOOD TEST: CPT

## 2023-02-05 PROCEDURE — 0 POTASSIUM CHLORIDE 10 MEQ/100ML SOLUTION: Performed by: STUDENT IN AN ORGANIZED HEALTH CARE EDUCATION/TRAINING PROGRAM

## 2023-02-05 PROCEDURE — G0378 HOSPITAL OBSERVATION PER HR: HCPCS

## 2023-02-05 PROCEDURE — 96365 THER/PROPH/DIAG IV INF INIT: CPT

## 2023-02-05 PROCEDURE — 99232 SBSQ HOSP IP/OBS MODERATE 35: CPT | Performed by: STUDENT IN AN ORGANIZED HEALTH CARE EDUCATION/TRAINING PROGRAM

## 2023-02-05 PROCEDURE — 96376 TX/PRO/DX INJ SAME DRUG ADON: CPT

## 2023-02-05 PROCEDURE — 95816 EEG AWAKE AND DROWSY: CPT

## 2023-02-05 PROCEDURE — 96375 TX/PRO/DX INJ NEW DRUG ADDON: CPT

## 2023-02-05 PROCEDURE — 25010000002 PROCHLORPERAZINE 10 MG/2ML SOLUTION: Performed by: INTERNAL MEDICINE

## 2023-02-05 PROCEDURE — 80053 COMPREHEN METABOLIC PANEL: CPT | Performed by: STUDENT IN AN ORGANIZED HEALTH CARE EDUCATION/TRAINING PROGRAM

## 2023-02-05 PROCEDURE — 99204 OFFICE O/P NEW MOD 45 MIN: CPT | Performed by: PSYCHIATRY & NEUROLOGY

## 2023-02-05 PROCEDURE — 96366 THER/PROPH/DIAG IV INF ADDON: CPT

## 2023-02-05 PROCEDURE — 25010000002 METOCLOPRAMIDE PER 10 MG: Performed by: STUDENT IN AN ORGANIZED HEALTH CARE EDUCATION/TRAINING PROGRAM

## 2023-02-05 PROCEDURE — 25010000002 HYDRALAZINE PER 20 MG: Performed by: INTERNAL MEDICINE

## 2023-02-05 RX ORDER — LABETALOL HYDROCHLORIDE 5 MG/ML
10 INJECTION, SOLUTION INTRAVENOUS EVERY 6 HOURS PRN
Status: DISCONTINUED | OUTPATIENT
Start: 2023-02-05 | End: 2023-02-06 | Stop reason: HOSPADM

## 2023-02-05 RX ORDER — POTASSIUM CHLORIDE 7.45 MG/ML
10 INJECTION INTRAVENOUS
Status: DISCONTINUED | OUTPATIENT
Start: 2023-02-05 | End: 2023-02-05

## 2023-02-05 RX ORDER — POTASSIUM CHLORIDE 7.45 MG/ML
10 INJECTION INTRAVENOUS
Status: COMPLETED | OUTPATIENT
Start: 2023-02-05 | End: 2023-02-05

## 2023-02-05 RX ORDER — CALCIUM CARBONATE 200(500)MG
2 TABLET,CHEWABLE ORAL 3 TIMES DAILY PRN
Status: DISCONTINUED | OUTPATIENT
Start: 2023-02-05 | End: 2023-02-06 | Stop reason: HOSPADM

## 2023-02-05 RX ORDER — AMLODIPINE BESYLATE 5 MG/1
10 TABLET ORAL
Status: DISCONTINUED | OUTPATIENT
Start: 2023-02-05 | End: 2023-02-06 | Stop reason: HOSPADM

## 2023-02-05 RX ORDER — METOCLOPRAMIDE HYDROCHLORIDE 5 MG/ML
5 INJECTION INTRAMUSCULAR; INTRAVENOUS
Status: DISCONTINUED | OUTPATIENT
Start: 2023-02-05 | End: 2023-02-05

## 2023-02-05 RX ADMIN — ATORVASTATIN CALCIUM 80 MG: 40 TABLET, FILM COATED ORAL at 20:30

## 2023-02-05 RX ADMIN — Medication 10 ML: at 20:31

## 2023-02-05 RX ADMIN — POTASSIUM CHLORIDE 10 MEQ: 7.46 INJECTION, SOLUTION INTRAVENOUS at 12:09

## 2023-02-05 RX ADMIN — LABETALOL HYDROCHLORIDE 10 MG: 5 INJECTION, SOLUTION INTRAVENOUS at 16:18

## 2023-02-05 RX ADMIN — METOCLOPRAMIDE 5 MG: 5 INJECTION, SOLUTION INTRAMUSCULAR; INTRAVENOUS at 11:05

## 2023-02-05 RX ADMIN — SODIUM CHLORIDE, POTASSIUM CHLORIDE, SODIUM LACTATE AND CALCIUM CHLORIDE 100 ML/HR: 600; 310; 30; 20 INJECTION, SOLUTION INTRAVENOUS at 12:09

## 2023-02-05 RX ADMIN — DEXTROSE MONOHYDRATE 25 G: 25 INJECTION, SOLUTION INTRAVENOUS at 23:26

## 2023-02-05 RX ADMIN — POTASSIUM CHLORIDE 10 MEQ: 7.46 INJECTION, SOLUTION INTRAVENOUS at 17:29

## 2023-02-05 RX ADMIN — POTASSIUM CHLORIDE 10 MEQ: 7.46 INJECTION, SOLUTION INTRAVENOUS at 18:31

## 2023-02-05 RX ADMIN — POTASSIUM CHLORIDE 10 MEQ: 7.46 INJECTION, SOLUTION INTRAVENOUS at 14:58

## 2023-02-05 RX ADMIN — TERAZOSIN HYDROCHLORIDE 2 MG: 2 CAPSULE ORAL at 20:30

## 2023-02-05 RX ADMIN — RIVAROXABAN 15 MG: 15 TABLET, FILM COATED ORAL at 17:29

## 2023-02-05 RX ADMIN — POTASSIUM CHLORIDE 10 MEQ: 7.46 INJECTION, SOLUTION INTRAVENOUS at 13:25

## 2023-02-05 RX ADMIN — PROCHLORPERAZINE EDISYLATE 5 MG: 5 INJECTION INTRAMUSCULAR; INTRAVENOUS at 06:48

## 2023-02-05 RX ADMIN — LABETALOL HYDROCHLORIDE 10 MG: 5 INJECTION, SOLUTION INTRAVENOUS at 11:31

## 2023-02-05 RX ADMIN — DONEPEZIL HYDROCHLORIDE 5 MG: 5 TABLET, FILM COATED ORAL at 20:31

## 2023-02-05 RX ADMIN — PROCHLORPERAZINE EDISYLATE 5 MG: 5 INJECTION INTRAMUSCULAR; INTRAVENOUS at 16:19

## 2023-02-05 RX ADMIN — CARVEDILOL 6.25 MG: 6.25 TABLET, FILM COATED ORAL at 20:30

## 2023-02-05 RX ADMIN — POTASSIUM CHLORIDE 10 MEQ: 7.46 INJECTION, SOLUTION INTRAVENOUS at 16:06

## 2023-02-05 RX ADMIN — HYDRALAZINE HYDROCHLORIDE 10 MG: 20 INJECTION INTRAMUSCULAR; INTRAVENOUS at 06:57

## 2023-02-05 NOTE — PROGRESS NOTES
"    [facility] Medicine Services  PROGRESS NOTE    Patient Name: Thelma Michael  : 1960  MRN: [id number]    Date of Admission: 2/3/2023  Primary Care Physician: Monet Howe APRN    Subjective   Subjective     CC:  N/V    HPI:  The patient reports feeling generalized weakness today.  She reports feeling quite sleepy.  Her battery pack ran out on her insulin pump.  Feels nauseated and unwilling to eat today.  Denied chest pain or abdominal pain.  While talking with the patient about her health, she began talking about how \"the other girls at dance do not like her\".  When asked what she is talking about dancing, patient says \"I do not know, I get some talking out of my head\".  Steven then began talking about dancing again.    ROS:  Gen- No fevers, chills  CV- No chest pain, palpitations  Resp- No cough, dyspnea  GI- No diarrhea, abd pain, +N without emesis    Objective   Objective     Vital Signs:   Temp:  [98.3 °F (36.8 °C)-99.4 °F (37.4 °C)] 98.6 °F (37 °C)  Heart Rate:  [61-82] 82  Resp:  [16-20] 20  BP: (133-204)/() 166/71     Physical Exam:  Constitutional: No acute distress, very sleepy, falls asleep while speaking  HENT: NCAT, mucous membranes moist, poor dentition  Respiratory: Clear to auscultation bilaterally, respiratory effort normal   Cardiovascular: RRR, no murmurs, rubs, or gallops  Gastrointestinal: Positive bowel sounds, soft, nontender, nondistended  Musculoskeletal: No bilateral ankle edema  Neurologic: Oriented x 3, Cranial Nerves grossly intact to confrontation, speech clear  Psych: Tangential thoughts  Skin: No rashes on exposed skin, no patches on her skin other than CGM.     Results Reviewed:  LAB RESULTS:      Lab 23  1110 23  0603 23  0512 23  1210   WBC 5.78  --  7.31 10.62   HEMOGLOBIN 10.9*  --  11.0* 14.8   HEMATOCRIT 33.2*  --  32.8* 43.2   PLATELETS 242  --  290 398   NEUTROS ABS  --   --  4.52 8.40* "   IMMATURE GRANS (ABS)  --   --  0.02 0.05   LYMPHS ABS  --   --  2.25 1.68   MONOS ABS  --   --  0.50 0.48   EOS ABS  --   --  0.01 0.00   MCV 92.5  --  90.6 88.2   PROCALCITONIN  --   --  0.31*  --    LACTATE  --  0.9  --  1.3         Lab 02/05/23  1110 02/04/23  0512 02/03/23  1210   SODIUM 141 144 142   POTASSIUM 3.1* 3.1* 3.1*   CHLORIDE 109* 111* 100   CO2 20.0* 24.0 25.0   ANION GAP 12.0 9.0 17.0*   BUN 20 51* 56*   CREATININE 1.01* 2.08* 2.19*   EGFR 62.3 26.2* 24.6*   GLUCOSE 157* 109* 169*   CALCIUM 8.2* 8.3* 9.9         Lab 02/05/23  1110 02/03/23  1210   TOTAL PROTEIN 5.6* 8.3   ALBUMIN 3.3* 4.7   GLOBULIN 2.3 3.6   ALT (SGPT) 10 14   AST (SGOT) 15 24   BILIRUBIN 0.5 0.6   ALK PHOS 59 95   LIPASE  --  22         Lab 02/04/23  0512   TROPONIN T 0.012             Lab 02/04/23  1130   ABO TYPING B   RH TYPING Positive   ANTIBODY SCREEN Negative         Lab 02/04/23  0539   FIO2 21   CARBOXYHEMOGLOBIN (VENOUS) 1.2     Brief Urine Lab Results  (Last result in the past 365 days)      Color   Clarity   Blood   Leuk Est   Nitrite   Protein   CREAT   Urine HCG        02/03/23 1332 Yellow   Clear   Small (1+)   Negative   Negative   >=300 mg/dL (3+)                 Microbiology Results Abnormal     Procedure Component Value - Date/Time    Blood Culture - Blood, Hand, Right [[id number]]  (Normal) Collected: 02/04/23 0649    Lab Status: Preliminary result Specimen: Blood from Hand, Right Updated: 02/05/23 0830     Blood Culture No growth at 24 hours    Narrative:      Aerobic bottle only      Blood Culture - Blood, Arm, Right [[id number]]  (Normal) Collected: 02/04/23 0649    Lab Status: Preliminary result Specimen: Blood from Arm, Right Updated: 02/05/23 0830     Blood Culture No growth at 24 hours    Narrative:      Aerobic bottle only      COVID PRE-OP / PRE-PROCEDURE SCREENING ORDER (NO ISOLATION) - Swab, Nasopharynx [[id number]]  (Normal) Collected: 02/04/23 0611    Lab Status: Final result Specimen: Swab from  Nasopharynx Updated: 02/04/23 0752    Narrative:      The following orders were created for panel order COVID PRE-OP / PRE-PROCEDURE SCREENING ORDER (NO ISOLATION) - Swab, Nasopharynx.  Procedure                               Abnormality         Status                     ---------                               -----------         ------                     Respiratory Panel PCR w/...[[id number]]  Normal              Final result                 Please view results for these tests on the individual orders.    Respiratory Panel PCR w/COVID-19(SARS-CoV-2) NORMA/JUAN ALBERTO/EDY/PAD/COR/MAD/ROSALINA In-House, NP Swab in UTM/VTM, 3-4 HR TAT - Swab, Nasopharynx [[id number]]  (Normal) Collected: 02/04/23 0611    Lab Status: Final result Specimen: Swab from Nasopharynx Updated: 02/04/23 0752     ADENOVIRUS, PCR Not Detected     Coronavirus 229E Not Detected     Coronavirus HKU1 Not Detected     Coronavirus NL63 Not Detected     Coronavirus OC43 Not Detected     COVID19 Not Detected     Human Metapneumovirus Not Detected     Human Rhinovirus/Enterovirus Not Detected     Influenza A PCR Not Detected     Influenza B PCR Not Detected     Parainfluenza Virus 1 Not Detected     Parainfluenza Virus 2 Not Detected     Parainfluenza Virus 3 Not Detected     Parainfluenza Virus 4 Not Detected     RSV, PCR Not Detected     Bordetella pertussis pcr Not Detected     Bordetella parapertussis PCR Not Detected     Chlamydophila pneumoniae PCR Not Detected     Mycoplasma pneumo by PCR Not Detected    Narrative:      In the setting of a positive respiratory panel with a viral infection PLUS a negative procalcitonin without other underlying concern for bacterial infection, consider observing off antibiotics or discontinuation of antibiotics and continue supportive care. If the respiratory panel is positive for atypical bacterial infection (Bordetella pertussis, Chlamydophila pneumoniae, or Mycoplasma pneumoniae), consider antibiotic de-escalation to target  atypical bacterial infection.          EEG    Result Date: 2/5/2023  Reason for referral: 62 y.o.female with altered mental status, consideration of seizure Technical Summary:  A 19 channel digital EEG was performed using the international 10-20 placement system, including eye leads and EKG leads. Duration: 22-minute Findings: The awake tracing shows diffuse low to medium amplitude intermixed theta and alpha activity which is present symmetrically over both hemispheres.  A clear posterior rhythm is not seen.  Photic stimulation does not change the background.  Sleep is not seen.  Hyperventilation is not performed.  No focal features or epileptiform activity are seen Video: On Technical quality: Superior EKG: Regular, approximately 70 bpm SUMMARY: Normal EEG in the awake state No focal features or epileptiform activity are seen     Impression: Normal study This report is transcribed using the Dragon dictation system.      CT Abdomen Pelvis Without Contrast    Result Date: 2/4/2023  EXAMINATION: 1.  CT CHEST WO CONTRAST DIAGNOSTIC 2.  CT ABDOMEN PELVIS WO CONTRAST   DATE OF EXAM: 2/4/2023 6:18 AM HISTORY: Sepsis, unclear source. COMPARISON: CT abdomen/pelvis without contrast dated 9/20/2022. TECHNIQUE: CT examination of the chest, abdomen and pelvis was performed without intravenous contrast. Axial, coronal, and sagittal images provided for review. CT dose lowering techniques were used, to include: automated exposure control, adjustment for patient size, and/or use of iterative reconstruction. Note: The exam is limited because some types of pathology may not be adequately demonstrated due to lack of contrast enhancement. FINDINGS: CHEST: Lungs:  Mild biapical scarring. Very mild subpleural emphysematous changes towards the apices. No suspicious consolidation. Subpleural 3 mm calcified granuloma at the left lower lobe. Pleura: Normal. Cardiovascular: Normal cardiac size. No pericardial effusion. Mild-to-moderate  atherosclerotic calcifications of the aortic arch. Mild coronary artery calcifications. Mediastinum And Mari: Small left perihilar calcifications could reflect small calcified lymph nodes. No pathologically enlarged lymph nodes identified in the chest. Hypodense nodule measuring 1.4 cm in the right adrenal gland. Chest Wall:  Normal. Chest Musculoskeletal: Reverse S shaped scoliotic curvature of the cervicothoracic spine. Possible osteopenia. No acute osseous abnormality identified. ABDOMEN/PELVIS: Liver: Normal. Gallbladder/Biliary: Question faint hyperdensity which could reflect sludge or layering noncalcified stones. Pancreas: Normal. Spleen: Normal. Adrenal Glands: Stable low density nodularity and bilateral adrenal glands suggests adenomas or hyperplasia. Kidneys: Normal. Bladder: Urinary bladder demonstrates prominent trabeculated wall. A prominent outpouching is present at the bladder dome compatible with relatively wide mouth diverticulum which measures 4.9 cm in the axial plane. Appearance of the urinary bladder is similar to prior exam. Mesentery/Peritoneum: No free air or free fluid. GI Tract: No bowel obstruction. Appendix is normal. Scattered colonic diverticula without obvious focal diverticulitis. Diverticula are most numerous at the sigmoid colon. Many portions of the colon are underdistended limiting evaluation. Reproductive: Normal. Lymph Nodes: Normal. Vasculature: Moderate aortobiiliac atherosclerotic calcifications. Normal caliber of aorta and IVC. Abdominal Wall: Normal. Abdomen Musculoskeletal: Possible osteopenia. Degenerative changes of the lumbosacral junction. No acute osseous abnormality.     Impression: 1.  No acute pathology identified on noncontrast evaluation of the chest, abdomen, or pelvis. 2.  Possible gallbladder sludge or layering noncalcified stones. 3.  Colonic diverticulosis without evidence of diverticulitis. 4.  Urinary bladder wall is prominent and trabeculated. Prominent  diverticula at bladder dome. Findings are unchanged from prior exam. Correlate with patient history. Potentially related to chronic urinary retention or cystitis. 5.  Stable low density bilateral adrenal nodularity suggests adenomas or hyperplasia. 6.  Evidence of prior granulomatous disease in the left chest. 7.  Low density right thyroid gland nodule. Nonemergent thyroid ultrasound can be obtained for further evaluation as clinically indicated. 8.  Other findings as above. Electronically signed by:  Pedro Blum D.O.  2/4/2023 5:30 AM Mountain Time    CT Chest Without Contrast Diagnostic    Result Date: 2/4/2023  EXAMINATION: 1.  CT CHEST WO CONTRAST DIAGNOSTIC 2.  CT ABDOMEN PELVIS WO CONTRAST   DATE OF EXAM: 2/4/2023 6:18 AM HISTORY: Sepsis, unclear source. COMPARISON: CT abdomen/pelvis without contrast dated 9/20/2022. TECHNIQUE: CT examination of the chest, abdomen and pelvis was performed without intravenous contrast. Axial, coronal, and sagittal images provided for review. CT dose lowering techniques were used, to include: automated exposure control, adjustment for patient size, and/or use of iterative reconstruction. Note: The exam is limited because some types of pathology may not be adequately demonstrated due to lack of contrast enhancement. FINDINGS: CHEST: Lungs:  Mild biapical scarring. Very mild subpleural emphysematous changes towards the apices. No suspicious consolidation. Subpleural 3 mm calcified granuloma at the left lower lobe. Pleura: Normal. Cardiovascular: Normal cardiac size. No pericardial effusion. Mild-to-moderate atherosclerotic calcifications of the aortic arch. Mild coronary artery calcifications. Mediastinum And Mari: Small left perihilar calcifications could reflect small calcified lymph nodes. No pathologically enlarged lymph nodes identified in the chest. Hypodense nodule measuring 1.4 cm in the right adrenal gland. Chest Wall:  Normal. Chest Musculoskeletal: Reverse S shaped  scoliotic curvature of the cervicothoracic spine. Possible osteopenia. No acute osseous abnormality identified. ABDOMEN/PELVIS: Liver: Normal. Gallbladder/Biliary: Question faint hyperdensity which could reflect sludge or layering noncalcified stones. Pancreas: Normal. Spleen: Normal. Adrenal Glands: Stable low density nodularity and bilateral adrenal glands suggests adenomas or hyperplasia. Kidneys: Normal. Bladder: Urinary bladder demonstrates prominent trabeculated wall. A prominent outpouching is present at the bladder dome compatible with relatively wide mouth diverticulum which measures 4.9 cm in the axial plane. Appearance of the urinary bladder is similar to prior exam. Mesentery/Peritoneum: No free air or free fluid. GI Tract: No bowel obstruction. Appendix is normal. Scattered colonic diverticula without obvious focal diverticulitis. Diverticula are most numerous at the sigmoid colon. Many portions of the colon are underdistended limiting evaluation. Reproductive: Normal. Lymph Nodes: Normal. Vasculature: Moderate aortobiiliac atherosclerotic calcifications. Normal caliber of aorta and IVC. Abdominal Wall: Normal. Abdomen Musculoskeletal: Possible osteopenia. Degenerative changes of the lumbosacral junction. No acute osseous abnormality.     Impression: 1.  No acute pathology identified on noncontrast evaluation of the chest, abdomen, or pelvis. 2.  Possible gallbladder sludge or layering noncalcified stones. 3.  Colonic diverticulosis without evidence of diverticulitis. 4.  Urinary bladder wall is prominent and trabeculated. Prominent diverticula at bladder dome. Findings are unchanged from prior exam. Correlate with patient history. Potentially related to chronic urinary retention or cystitis. 5.  Stable low density bilateral adrenal nodularity suggests adenomas or hyperplasia. 6.  Evidence of prior granulomatous disease in the left chest. 7.  Low density right thyroid gland nodule. Nonemergent thyroid  ultrasound can be obtained for further evaluation as clinically indicated. 8.  Other findings as above. Electronically signed by:  Pedro Blum D.O.  2/4/2023 5:30 AM Mountain Time    MRI Angiogram Head Without Contrast    Result Date: 2/4/2023  MRI BRAIN WO CONTRAST, MRI ANGIOGRAM HEAD WO CONTRAST, MRI ANGIOGRAM NECK WO CONTRAST Date of Exam: 2/4/2023 2:11 PM EST Indication: Mental status change, unknown cause.  Comparison: CT head earlier the same day Technique:  Multiplanar multisequence MRI of the brain performed without IV contrast. Noncontrast time-of-flight 3-dimensional MR angiogram of the head and neck with three-dimensional maximum intensity projections postprocessed Findings: MRI brain: No acute infarct is present on diffusion weighted sequences. Midline structures are normal in the cranial cervical junction is satisfactory in appearance. Fairly advanced age-related changes are present with extensive T2 hyperintense periventricular and pontine white matter changes , likely reflecting advanced chronic microvascular ischemia. There is otherwise no evidence of intracranial hemorrhage, mass or mass effect. The ventricles are normal in size and configuration. The orbits are normal. The paranasal sinuses are grossly clear. Intracranial arterial flow voids are maintained. MR angiogram: The carotid siphons demonstrate expected flow related signal. The anterior cerebral arteries are normal in course and caliber bilaterally. The right middle cerebral artery demonstrates no evidence of flow-limiting stenosis, large vessel occlusion or aneurysmal dilatation. The left middle cerebral artery is similarly normal in course and caliber. The vertebrobasilar system is patent. The posterior cerebral arteries are normal in course and caliber bilaterally. Additional time-of-flight MR angiogram of the neck demonstrates expected flow related signal through the right ICA origin, with 0% atherosclerotic narrowing by NASCET  criteria. Similar 0% narrowing is present on the left. The vertebral arteries are normal in course and caliber bilaterally.     Impression: Impression: There is no evidence of acute infarct, hemorrhage, mass or mass effect. There are redemonstrated fairly extensive and confluent periventricular and pontine white matter changes, again favored to reflect sequela of chronic microvascular ischemia. Essentially normal MR angiogram of the head and neck. There is no evidence of flow-limiting stenosis, large vessel occlusion or aneurysmal dilatation. Electronically Signed: Arya Kilgoreord  2/4/2023 3:08 PM EST  Workstation ID: [id number]    MRI Angiogram Neck Without Contrast    Result Date: 2/4/2023  MRI BRAIN WO CONTRAST, MRI ANGIOGRAM HEAD WO CONTRAST, MRI ANGIOGRAM NECK WO CONTRAST Date of Exam: 2/4/2023 2:11 PM EST Indication: Mental status change, unknown cause.  Comparison: CT head earlier the same day Technique:  Multiplanar multisequence MRI of the brain performed without IV contrast. Noncontrast time-of-flight 3-dimensional MR angiogram of the head and neck with three-dimensional maximum intensity projections postprocessed Findings: MRI brain: No acute infarct is present on diffusion weighted sequences. Midline structures are normal in the cranial cervical junction is satisfactory in appearance. Fairly advanced age-related changes are present with extensive T2 hyperintense periventricular and pontine white matter changes , likely reflecting advanced chronic microvascular ischemia. There is otherwise no evidence of intracranial hemorrhage, mass or mass effect. The ventricles are normal in size and configuration. The orbits are normal. The paranasal sinuses are grossly clear. Intracranial arterial flow voids are maintained. MR angiogram: The carotid siphons demonstrate expected flow related signal. The anterior cerebral arteries are normal in course and caliber bilaterally. The right middle cerebral artery demonstrates  no evidence of flow-limiting stenosis, large vessel occlusion or aneurysmal dilatation. The left middle cerebral artery is similarly normal in course and caliber. The vertebrobasilar system is patent. The posterior cerebral arteries are normal in course and caliber bilaterally. Additional time-of-flight MR angiogram of the neck demonstrates expected flow related signal through the right ICA origin, with 0% atherosclerotic narrowing by NASCET criteria. Similar 0% narrowing is present on the left. The vertebral arteries are normal in course and caliber bilaterally.     Impression: Impression: There is no evidence of acute infarct, hemorrhage, mass or mass effect. There are redemonstrated fairly extensive and confluent periventricular and pontine white matter changes, again favored to reflect sequela of chronic microvascular ischemia. Essentially normal MR angiogram of the head and neck. There is no evidence of flow-limiting stenosis, large vessel occlusion or aneurysmal dilatation. Electronically Signed: Arya Muñoz  2/4/2023 3:08 PM EST  Workstation ID: [id number]    MRI Brain Without Contrast    Result Date: 2/4/2023  MRI BRAIN WO CONTRAST, MRI ANGIOGRAM HEAD WO CONTRAST, MRI ANGIOGRAM NECK WO CONTRAST Date of Exam: 2/4/2023 2:11 PM EST Indication: Mental status change, unknown cause.  Comparison: CT head earlier the same day Technique:  Multiplanar multisequence MRI of the brain performed without IV contrast. Noncontrast time-of-flight 3-dimensional MR angiogram of the head and neck with three-dimensional maximum intensity projections postprocessed Findings: MRI brain: No acute infarct is present on diffusion weighted sequences. Midline structures are normal in the cranial cervical junction is satisfactory in appearance. Fairly advanced age-related changes are present with extensive T2 hyperintense periventricular and pontine white matter changes , likely reflecting advanced chronic microvascular ischemia. There is  otherwise no evidence of intracranial hemorrhage, mass or mass effect. The ventricles are normal in size and configuration. The orbits are normal. The paranasal sinuses are grossly clear. Intracranial arterial flow voids are maintained. MR angiogram: The carotid siphons demonstrate expected flow related signal. The anterior cerebral arteries are normal in course and caliber bilaterally. The right middle cerebral artery demonstrates no evidence of flow-limiting stenosis, large vessel occlusion or aneurysmal dilatation. The left middle cerebral artery is similarly normal in course and caliber. The vertebrobasilar system is patent. The posterior cerebral arteries are normal in course and caliber bilaterally. Additional time-of-flight MR angiogram of the neck demonstrates expected flow related signal through the right ICA origin, with 0% atherosclerotic narrowing by NASCET criteria. Similar 0% narrowing is present on the left. The vertebral arteries are normal in course and caliber bilaterally.     Impression: Impression: There is no evidence of acute infarct, hemorrhage, mass or mass effect. There are redemonstrated fairly extensive and confluent periventricular and pontine white matter changes, again favored to reflect sequela of chronic microvascular ischemia. Essentially normal MR angiogram of the head and neck. There is no evidence of flow-limiting stenosis, large vessel occlusion or aneurysmal dilatation. Electronically Signed: Arya Muñoz  2/4/2023 3:08 PM EST  Workstation ID: [id number]    CT Head Without Contrast Stroke Protocol    Result Date: 2/4/2023  EXAMINATION:  CT HEAD WO CONTRAST STROKE PROTOCOL DATE: 2/4/2023 6:14 AM  INDICATION:  Altered mental status.  COMPARISON: Head CT October 31, 2019.  TECHNIQUE:  Routine axial images through the head without contrast. Low-dose CT acquisition technique included one or more of the following options: 1. Automated exposure control, 2. Adjustment of mA and/or KV  according to patient's size and/or 3. Use of iterative reconstruction. FINDINGS:  Intracranial Contents: Mild volume loss. Chronic infarcts in the left cerebellum and right basal ganglia. Confluent hypodensities in the periventricular white matter, most consistent with chronic small vessel ischemic changes. No acute intracranial hemorrhage. No mass effect, midline shift, hydrocephalus, herniation, or extra axial fluid collection.  Bones and Extracranial Soft Tissues: The calvarium is intact. Normal extracranial soft tissues. Minimal mucosal thickening ethmoid air cells. The remaining paranasal sinuses and mastoid air cells are well aerated. No acute abnormality of the orbits or globes. Distal internal carotid artery atherosclerotic calcifications.     Impression: 1. No acute intracranial abnormality. MRI is more sensitive for evaluation of acute ischemia within the first 24 to 48 hours. 2. Volume loss. Moderate to severe chronic small vessel ischemic changes. Chronic infarct in the right lentiform nucleus which is at least new compared to the head CT in 2019. Chronic left cerebellar lacunar infarct. I communicated these results by phone to  Mariajose Awad at 4:36 AM Mountain time on 2/4/2023. The results were communicated back and were said to be understood. Electronically signed by:  Terry Lizama M.D.  2/4/2023 4:53 AM Mountain Time      Results for orders placed during the hospital encounter of 12/06/22    Adult Transthoracic Echo Complete W/ Cont if Necessary Per Protocol    Interpretation Summary  •  Left ventricular ejection fraction appears to be greater than 70%.  •  Left ventricular wall thickness is consistent with moderate concentric hypertrophy.  •  Left ventricular diastolic function is consistent with (grade I) impaired relaxation.  •  Estimated right ventricular systolic pressure from tricuspid regurgitation is mildly elevated (35-45 mmHg). Calculated right ventricular systolic pressure from tricuspid  regurgitation is 39 mmHg.      I have reviewed the medications:  Scheduled Meds:amLODIPine, 10 mg, Oral, Q24H  atorvastatin, 80 mg, Oral, Nightly  carvedilol, 6.25 mg, Oral, BID  donepezil, 5 mg, Oral, Nightly  FLUoxetine, 20 mg, Oral, Daily  insulin lispro, 0-7 Units, Subcutaneous, TID AC  metoclopramide, 5 mg, Intravenous, 4x Daily AC & at Bedtime  pantoprazole, 40 mg, Oral, Daily  potassium chloride, 10 mEq, Intravenous, Q1H  rivaroxaban, 15 mg, Oral, Daily With Dinner  sodium chloride, 10 mL, Intravenous, Q12H  terazosin, 2 mg, Oral, Nightly      Continuous Infusions:lactated ringers, 100 mL/hr, Last Rate: 100 mL/hr (02/05/23 1209)      PRN Meds:.•  acetaminophen  •  albuterol  •  calcium carbonate  •  Calcium Replacement - Initiate Nurse / BPA Driven Protocol  •  dextrose  •  dextrose  •  glucagon (human recombinant)  •  hydrALAZINE  •  influenza vaccine  •  labetalol  •  Magnesium Standard Dose Replacement - Initiate Nurse / BPA Driven Protocol  •  melatonin  •  Phosphorus Replacement - Initiate Nurse / BPA Driven Protocol  •  Potassium Replacement - Initiate Nurse / BPA Driven Protocol  •  prochlorperazine **OR** prochlorperazine **OR** prochlorperazine  •  Sodium Chloride (PF)  •  sodium chloride  •  sodium chloride    Assessment & Plan   Assessment & Plan     Active Hospital Problems    Diagnosis  POA   • **Volume depletion [E86.9]  Yes   • Hypokalemia [E87.6]  Yes   • Gastroparesis [K31.84]  Yes   • Uncontrolled type 1 diabetes mellitus with hyperglycemia (HCC) [E10.65]  Yes   • Hypertensive urgency [I16.0]  Yes   • RAFAT (acute kidney injury) (HCC) [N17.9]  Yes      Resolved Hospital Problems   No resolved problems to display.        Brief Hospital Course to date:  Thelma Michael is a 62 y.o. female with uncontrolled diabetes mellitus type I, gastroparesis, HLD, HTN, migraines, GERD, who presented with intractable n/v.     Intractable nausea and vomiting  Diabetic gastroparesis  Dehydration  --Based  on her prior admissions, started on Reglan and compazine.  --IVF.  --Advanced to diabetic diet  --Per patient, she was supposed to get a gastric stimulator placed on 2/6, but she stated that on Friday she called them and stated she was not sure she was ready to get it done due to how she feels currently.  --Discussed with the patient that the primary reason for being here is from her gastroparesis and that she would really benefit from discharge home today and follow-up with the motility clinic to get the gastric stimulator to be placed, but patient states she does not want to leave the hospital today. She also stated she was already in the process of re-scheduling this.     Altered mental status  --Suspect related to Gabapentin use, mirtazapine use, and potentially Reglan use, and also hospital delirium  --Has had 2 code strokes called on her, both on 2/4  --Stroke team evaluated  --MRI negative for acute stroke  --EEG wnl  --Dr. Khan to see today; discussed on 2/5  --We will hold mirtazapine for now and Reglan was resumed; will continue to hold gabapentin including on discharge     RAFAT  --Due to above while using losartan. Hold losartan and hydrate as above. Improving.   --BMP in am.     HypoK  --replace per protocol     Hypertensive urgency  --Due to inability to tolerate oral meds. Holding losartan given RAFAT.   -- Resumed home Coreg, started amlodipine 10, as needed hydralazine, As needed Labetalol     Type 1 DM  --Uses insulin pump at home.  --Pump battery ran out overnight; currently doing low-dose SSI; she is VERY insulin insensitive and so stick with the low-dose SSI  --DM educator    Chronic infarct in the right lentiform nucleus  Chronic left cerebellar lacunar infarct  Moderate to severe chronic small vessel ischemic changes  --Follow-up with stroke outpatient  --Continue atorvastatin 80 mg once daily  -- Aspirin 81 mg daily    Right thyroid nodule  Bilateral adrenal nodularity  --Need to discuss patient  patient regarding outpatient follow-up and imaging for this     Chronic a fib  --Coreg for rate control. Home Xarelto.     DVT prophylaxis:  Xarelto    Expected Discharge Location and Transportation: home  Expected Discharge   Expected Discharge Date and Time     Expected Discharge Date Expected Discharge Time    Feb 5, 2023            DVT prophylaxis:  Medical and mechanical DVT prophylaxis orders are present.     AM-PAC 6 Clicks Score (PT): 21 (02/05/23 0913)    CODE STATUS:   Code Status and Medical Interventions:   Ordered at: 02/03/23 1338     Code Status (Patient has no pulse and is not breathing):    CPR (Attempt to Resuscitate)     Medical Interventions (Patient has pulse or is breathing):    Full Support       Faith Nelson MD  02/05/23

## 2023-02-05 NOTE — PLAN OF CARE
Problem: Adult Inpatient Plan of Care  Goal: Optimal Comfort and Wellbeing  Outcome: Ongoing, Progressing     Problem: Pain Acute  Goal: Acceptable Pain Control and Functional Ability  Outcome: Ongoing, Progressing  Intervention: Prevent or Manage Pain  Recent Flowsheet Documentation  Taken 2/5/2023 0400 by Amy Millard RN  Medication Review/Management: medications reviewed  Taken 2/5/2023 0200 by Amy Millard RN  Medication Review/Management: medications reviewed  Taken 2/4/2023 2000 by Amy Millard RN  Medication Review/Management: medications reviewed     Problem: Nausea and Vomiting  Goal: Fluid and Electrolyte Balance  Outcome: Ongoing, Progressing   Goal Outcome Evaluation:         No acute distress overnight, no neurologic changes/events overnight. Pt insulin pump back on, sugars are stable in low 100's. Pt daughter states pt has not taken gabapentin in the last two months and thinks this restarting of gabapentin could have contributed to the neurological events on 2/4, gabapentin has been D/c, EEG ordered.

## 2023-02-05 NOTE — CONSULTS
Neurology    Referring Provider: Dr. Cramer.    Reason for Consultation: Episodes of unresponsiveness    Chief complaint: Nausea, vomiting    HPI:  Patient is a 62-year-old female with past medical history of diabetic gastroparesis, diabetes, hypertension, hyperlipidemia presented to [facility] ED with complaint of nausea, vomiting and diarrhea.  She is started having abdominal pain and diarrhea about 4 days prior to the admission and reports that it has resolved completely now.  Neurology was consulted because of 2 episodes of unresponsiveness that she had yesterday.  Rapid response was called and code stroke was called.  She had CT head, MRI brain, MR angiogram of brain and neck which did not reveal any acute intracranial abnormalities.  I reviewed this images personally.  Since these 2 episodes, she has not had any further episodes EEG was completed this morning which did not reveal any abnormalities as well.  During interview, she was noted to go to sleep for a brief period of time and then she would wake up and talk to me without any issues.  She is scheduled to have gastric stimulator placed tomorrow but has decided to reschedule the appointment as she is not feeling well.  She was on low-dose gabapentin at home but was needed taking it for about 2 months.  It was stopped yesterday after the episodes of unresponsiveness.  She also has been taking Reglan up to 4 times a day at home for nausea.  It was stopped yesterday and has been resumed but at lower frequency now.  Mirtazapine is on hold as well.    Current Facility-Administered Medications:   •  acetaminophen (TYLENOL) tablet 650 mg, 650 mg, Oral, Q4H PRN, RiccardoAnay M II, DO, 650 mg at 02/04/23 1504  •  albuterol (PROVENTIL) nebulizer solution 0.083% 2.5 mg/3mL, 2.5 mg, Nebulization, Q4H PRN, RiccardoAnay downey M II, DO  •  amLODIPine (NORVASC) tablet 10 mg, 10 mg, Oral, Q24H, Faith Nelson MD, 10 mg at 02/05/23 0913  •  atorvastatin (LIPITOR) tablet  80 mg, 80 mg, Oral, Nightly, RiccardoSaturninocy M II, DO, 80 mg at 02/04/23 2122  •  calcium carbonate (TUMS) chewable tablet 500 mg (200 mg elemental), 2 tablet, Oral, TID PRN, Faith Nelson MD  •  Calcium Replacement - Initiate Nurse / BPA Driven Protocol, , Does not apply, PRN, Faith Nelson MD  •  carvedilol (COREG) tablet 6.25 mg, 6.25 mg, Oral, BID, Riccardo, Anya M II, DO, 6.25 mg at 02/05/23 0913  •  dextrose (D50W) (25 g/50 mL) IV injection 25 g, 25 g, Intravenous, Q15 Min PRN, Anay Gu M II, DO  •  dextrose (GLUTOSE) oral gel 15 g, 15 g, Oral, Q15 Min PRN, Riccardo, Anay M II, DO  •  donepezil (ARICEPT) tablet 5 mg, 5 mg, Oral, Nightly, Riccardo, Anay M II, DO, 5 mg at 02/04/23 2122  •  FLUoxetine (PROzac) capsule 20 mg, 20 mg, Oral, Daily, Riccardo, Anay M II, DO, 20 mg at 02/05/23 0913  •  glucagon (GLUCAGEN) injection 1 mg, 1 mg, Intramuscular, Q15 Min PRN, RiccardoSaturninocy M II, DO  •  hydrALAZINE (APRESOLINE) injection 10 mg, 10 mg, Intravenous, Q6H PRN, Anay Gu M II, DO, 10 mg at 02/05/23 0657  •  influenza vac split quad (FLUZONE,FLUARIX,AFLURIA,FLULAVAL) injection 0.5 mL, 0.5 mL, Intramuscular, During Hospitalization, Anay Gu M II, DO  •  Insulin Lispro (humaLOG) injection 0-7 Units, 0-7 Units, Subcutaneous, TID AC, Faith Neslon MD  •  labetalol (NORMODYNE,TRANDATE) injection 10 mg, 10 mg, Intravenous, Q6H PRN, Faith Nelson MD, 10 mg at 02/05/23 1131  •  lactated ringers infusion, 100 mL/hr, Intravenous, Continuous, Faith Nelson MD, Last Rate: 100 mL/hr at 02/05/23 1209, 100 mL/hr at 02/05/23 1209  •  Magnesium Standard Dose Replacement - Initiate Nurse / BPA Driven Protocol, , Does not apply, PRN, Faith Nelson MD  •  melatonin tablet 5 mg, 5 mg, Oral, Nightly PRN, Anay Gu II, DO  •  metoclopramide (REGLAN) injection 5 mg, 5 mg, Intravenous, 4x Daily AC & at Bedtime, Faith Nelson MD, 5 mg at 02/05/23 1105  •  pantoprazole (PROTONIX) EC tablet 40 mg, 40 mg, Oral, Daily,  Riccardo Anay M II, DO, 40 mg at 02/05/23 0913  •  Phosphorus Replacement - Initiate Nurse / BPA Driven Protocol, , Does not apply, PRN, Faith Nelson MD  •  potassium chloride 10 mEq in 100 mL IVPB, 10 mEq, Intravenous, Q1H, Faith Nelson MD, Last Rate: 100 mL/hr at 02/05/23 1325, 10 mEq at 02/05/23 1325  •  Potassium Replacement - Initiate Nurse / BPA Driven Protocol, , Does not apply, PRN, Faith Nelson MD  •  prochlorperazine (COMPAZINE) injection 5 mg, 5 mg, Intravenous, Q6H PRN, 5 mg at 02/05/23 0648 **OR** prochlorperazine (COMPAZINE) tablet 5 mg, 5 mg, Oral, Q6H PRN **OR** prochlorperazine (COMPAZINE) suppository 25 mg, 25 mg, Rectal, Q12H PRN, Saturnino Gucy M II, DO  •  rivaroxaban (XARELTO) tablet 15 mg, 15 mg, Oral, Daily With Dinner, Saturnino Gucy M II, DO, 15 mg at 02/04/23 1729  •  Sodium Chloride (PF) 0.9 % 10 mL, 10 mL, Intravenous, PRN, Emergency, Triage Protocol, MD  •  sodium chloride 0.9 % flush 10 mL, 10 mL, Intravenous, Q12H, Riccardo Anay M II, DO, 10 mL at 02/04/23 2123  •  sodium chloride 0.9 % flush 10 mL, 10 mL, Intravenous, PRN, Riccardo Anay M II, DO  •  sodium chloride 0.9 % infusion 40 mL, 40 mL, Intravenous, PRN, Riccardo, Anay M II, DO  •  terazosin (HYTRIN) capsule 2 mg, 2 mg, Oral, Nightly, Riccardo Anay M II, DO, 2 mg at 02/04/23 2122       Past Medical History:   Diagnosis Date   • Acid reflux    • Acute bronchitis    • Cardiac murmur    • Diabetes mellitus (HCC)    • H/O echocardiogram 08/07/2012    i. LVEF 65%.ii. Mild LVH.iii. Borderline evidence of atrial septal aneurysm.  No PFO.    • History of nuclear stress test 08/22/2014    Negative for ischemia and scars; LVEF 77%.     • Hyperlipidemia    • Hypertension    • Impacted cerumen of both ears    • Migraine    • Self-catheterizes urinary bladder    • Sinusitis    • Stroke (HCC)    • Tobacco abuse     quit 4 days ago.     • Urticaria         Past Surgical History:   Procedure Laterality Date   • CAPSULE ENDOSCOPY   07/27/2021    Procedure: PILLCAM DEPLOYMENT;  Surgeon: Mikael Worthy MD;  Location:  [facility];  Service: Gastroenterology;;   • COLONOSCOPY     • COLONOSCOPY N/A 07/27/2021    Procedure: COLONOSCOPY;  Surgeon: Mikael Worthy MD;  Location:  [facility];  Service: Gastroenterology;  Laterality: N/A;   • DENTAL PROCEDURE     • ENDOSCOPY N/A 06/30/2021    Procedure: ESOPHAGOGASTRODUODENOSCOPY;  Surgeon: Brunner, Mark I, MD;  Location:  [facility];  Service: Gastroenterology;  Laterality: N/A;   • ENDOSCOPY N/A 07/27/2021    Procedure: ESOPHAGOGASTRODUODENOSCOPY;  Surgeon: Mikael Worthy MD;  Location:  [facility];  Service: Gastroenterology;  Laterality: N/A;   • ENDOSCOPY WITH JTUBE N/A 03/16/2022    Procedure: ESOPHAGOGASTRODUODENOSCOPY WITH JEJUNAL TUBE INSERTION;  Surgeon: Thom Glover MD;  Location:  [facility];  Service: Gastroenterology;  Laterality: N/A;   • NO PAST SURGERIES     • UPPER GASTROINTESTINAL ENDOSCOPY          Family History   Problem Relation Age of Onset   • Anxiety disorder Other    • Arthritis Other    • ADD / ADHD Other    • Heart disease Other         cardiac disorder   • Depression Other    • Diabetes Other    • Hyperlipidemia Other    • Hypertension Other    • Lung cancer Other    • Osteoporosis Other    • Hypertension Brother    • Diabetes Brother    • Hyperlipidemia Mother    • Hypertension Mother    • Colon cancer Neg Hx         Social History     Socioeconomic History   • Marital status:    Tobacco Use   • Smoking status: Former     Packs/day: 0.25     Years: 30.00     Pack years: 7.50     Types: Cigarettes     Quit date: 5/28/2019     Years since quitting: 3.6   • Smokeless tobacco: Never   • Tobacco comments:     BC PL never smoker    Vaping Use   • Vaping Use: Never used   Substance and Sexual Activity   • Alcohol use: Yes     Alcohol/week: 1.0 standard drink     Types: 1 Glasses of wine per week     Comment: ocassional   •  "Drug use: No   • Sexual activity: Not Currently     Comment: Single        Review of Systems   All other systems reviewed and are negative.      Pertinent items are noted in HPI, all other systems reviewed and negative     Objective:    /71 (BP Location: Left arm, Patient Position: Lying)   Pulse 82   Temp 98.6 °F (37 °C) (Oral)   Resp 20   Ht 172.7 cm (68\")   Wt 59.1 kg (130 lb 3.2 oz)   SpO2 97%   BMI 19.80 kg/m²     Exam:     Cardiovascular:  No rubs, gallops or murmurs. Regular rate and rhythm.     Neurology Exam:    General appearance: NAD.     Mental status: Alert, awake and oriented to time place and person.     Language and Speech: Intact- No dysarthria.    Cranial Nerves:   CN II: Visual fields are full. Intact. Pupils - SAHRA  CN III, IV and VI: Extraocular movements are intact. Normal saccades.   CN V: Facial sensation is intact.   CN VII: Muscles of facial expression reveal no asymmetry. Intact.   CN VIII: Hearing is intact. Whispered voice intact.   CN IX and X: Palate elevates symmetrically. Intact  CN XI: Shoulder shrug is intact.   CN XII: Tongue is midline without evidence of atrophy or fasciculation.     Ophthalmoscopic Exam: Fundi - Normal, No papilledema    Motor:  Right UE muscle strength 5/5. Normal tone.     Left UE muscle strength 5/5. Normal tone.      Right LE muscle strength5/5. Normal tone.     Left LE muscle strength 5/5. Normal tone.      Sensory: Normal light touch, vibration and pinprick sensation bilaterally.    DTRs: 2+ bilaterally in upper and lower extremities.    Babinski: Negative bilaterally.    Coordination: Normal finger-to-nose, heel to chris B/L.    Romberg: Not assessed.    Gait: Not assessed.    Results Reviewed:     Labs:  Most recent labs have been reviewed.    Results from last 7 days   Lab Units 02/05/23  1110   WBC 10*3/mm3 5.78   HEMOGLOBIN g/dL 10.9*   HEMATOCRIT % 33.2*   PLATELETS 10*3/mm3 242     Results from last 7 days   Lab Units 02/05/23  1110 "   SODIUM mmol/L 141   POTASSIUM mmol/L 3.1*   CHLORIDE mmol/L 109*   CO2 mmol/L 20.0*   BUN mg/dL 20   CREATININE mg/dL 1.01*   CALCIUM mg/dL 8.2*       Radiology: EEG    Result Date: 2/5/2023  Normal study This report is transcribed using the Dragon dictation system.      CT Abdomen Pelvis Without Contrast    Result Date: 2/4/2023  1.  No acute pathology identified on noncontrast evaluation of the chest, abdomen, or pelvis. 2.  Possible gallbladder sludge or layering noncalcified stones. 3.  Colonic diverticulosis without evidence of diverticulitis. 4.  Urinary bladder wall is prominent and trabeculated. Prominent diverticula at bladder dome. Findings are unchanged from prior exam. Correlate with patient history. Potentially related to chronic urinary retention or cystitis. 5.  Stable low density bilateral adrenal nodularity suggests adenomas or hyperplasia. 6.  Evidence of prior granulomatous disease in the left chest. 7.  Low density right thyroid gland nodule. Nonemergent thyroid ultrasound can be obtained for further evaluation as clinically indicated. 8.  Other findings as above. Electronically signed by:  Pedro Blum D.O.  2/4/2023 5:30 AM Mountain Time    CT Chest Without Contrast Diagnostic    Result Date: 2/4/2023  1.  No acute pathology identified on noncontrast evaluation of the chest, abdomen, or pelvis. 2.  Possible gallbladder sludge or layering noncalcified stones. 3.  Colonic diverticulosis without evidence of diverticulitis. 4.  Urinary bladder wall is prominent and trabeculated. Prominent diverticula at bladder dome. Findings are unchanged from prior exam. Correlate with patient history. Potentially related to chronic urinary retention or cystitis. 5.  Stable low density bilateral adrenal nodularity suggests adenomas or hyperplasia. 6.  Evidence of prior granulomatous disease in the left chest. 7.  Low density right thyroid gland nodule. Nonemergent thyroid ultrasound can be obtained for  further evaluation as clinically indicated. 8.  Other findings as above. Electronically signed by:  Pedro Blum D.O.  2/4/2023 5:30 AM Mountain Time    MRI Angiogram Head Without Contrast    Result Date: 2/4/2023  Impression: There is no evidence of acute infarct, hemorrhage, mass or mass effect. There are redemonstrated fairly extensive and confluent periventricular and pontine white matter changes, again favored to reflect sequela of chronic microvascular ischemia. Essentially normal MR angiogram of the head and neck. There is no evidence of flow-limiting stenosis, large vessel occlusion or aneurysmal dilatation. Electronically Signed: Arya Muñoz  2/4/2023 3:08 PM EST  Workstation ID: [id number]    MRI Angiogram Neck Without Contrast    Result Date: 2/4/2023  Impression: There is no evidence of acute infarct, hemorrhage, mass or mass effect. There are redemonstrated fairly extensive and confluent periventricular and pontine white matter changes, again favored to reflect sequela of chronic microvascular ischemia. Essentially normal MR angiogram of the head and neck. There is no evidence of flow-limiting stenosis, large vessel occlusion or aneurysmal dilatation. Electronically Signed: Arya Muñoz  2/4/2023 3:08 PM EST  Workstation ID: [id number]    MRI Brain Without Contrast    Result Date: 2/4/2023  Impression: There is no evidence of acute infarct, hemorrhage, mass or mass effect. There are redemonstrated fairly extensive and confluent periventricular and pontine white matter changes, again favored to reflect sequela of chronic microvascular ischemia. Essentially normal MR angiogram of the head and neck. There is no evidence of flow-limiting stenosis, large vessel occlusion or aneurysmal dilatation. Electronically Signed: Arya Muñoz  2/4/2023 3:08 PM EST  Workstation ID: [id number]    CT Head Without Contrast Stroke Protocol    Result Date: 2/4/2023  1. No acute intracranial abnormality. MRI is more sensitive  for evaluation of acute ischemia within the first 24 to 48 hours. 2. Volume loss. Moderate to severe chronic small vessel ischemic changes. Chronic infarct in the right lentiform nucleus which is at least new compared to the head CT in 2019. Chronic left cerebellar lacunar infarct. I communicated these results by phone to  Mariajose Awad at 4:36 AM Mountain time on 2/4/2023. The results were communicated back and were said to be understood. Electronically signed by:  Terry Lizama M.D.  2/4/2023 4:53 AM Mountain Time      I personally reviewed MRI brain without contrast which did not have any acute intracranial abnormalities.  MR angiogram of brain and neck did not reveal any flow-limiting stenosis vascular abnormalities.    EEG:   Did not show any evidence of epileptiform abnormalities or electrographic seizures.      Assessment & Plan     62-year-old female with history of diabetes, diabetic gastroparesis presented with nausea and vomiting.  Neurology was consulted for 2 episodes of unresponsiveness yesterday.    -?  Caused by medication side effect versus possible conversion/nonorganic cause  -Today during interview, she seemed to be going to sleep for brief periods of time and then she would be awake and talk to me.  -Gabapentin and mirtazapine is on hold.  Reglan was restarted at lower frequency which is fine.  -On detailed neurological evaluation, no abnormalities were found.  -Continue aspirin 81 mg daily, Lipitor 80 mg daily for secondary stroke prevention.  Continue with strict glycemic control.  -OT, PT as tolerated.  -May need outpatient psych evaluation if she continues to have these episodes of unresponsiveness.  -No further recommendation from neurology standpoint.  Neurology will sign off.  Please call back with any questions or concerns that you may have.    I discussed the patients findings and my recommendations with patient and consulting provider    As part of this visit I reviewed prior lab  results, reviewed radiology results, reviewed radiology images and reviewed records from the current hospitalization which is incorporated in the HPI. Please see above for details.        Francisco Javier Khan MD  02/05/23  13:58 EST

## 2023-02-05 NOTE — CONSULTS
"                    Clinical Nutrition     Patient Name: Thelma Michael  YOB: 1960  MRN: [id number]  Date of Encounter: 02/05/23 11:54 EST  Admission date: 2/3/2023    Reason for Visit   Identified at risk by screening criteria, Reduced oral intake, RN consult    EMR Reviewed: Yes       Diet Nutrition Related History:      Pt admitted n/v. PMH of malnutrition (non severe) chronic malnutrition dx (6/29/22 and 2/12/22) and severe malnutrition in context of acute illness (4/30/21). Pt was very tired during visit but would respond with answers. Pt reports not eating for 3 days d/t n/v. Pt estimated UBW of 139#. EMR shows a wt loss of 12# (8.4%) within ~2 months (significant to malnutrition time frame criteria). Pt nodded head no to NKFA and difficulty chewing/swallowing. Verified NKFA in epic. Observed untouched lunch next to bedside.      Anthropometrics   Height: Height: 172.7 cm (68\")  Admission Weight:   Flowsheet Rows    Flowsheet Row First Filed Value   Admission Height 172.7 cm (68\") Documented at 02/03/2023 1202   Admission Weight 71.7 kg (158 lb) Documented at 02/03/2023 1202        Last Filed Weight:Weight: 59.1 kg (130 lb 3.2 oz) (02/03/23 1504)  Weight Method: Standing scale  BMI: BMI (Calculated): 19.8  BMI classification: Normal: 18.5-24.9kg/m2   IBW: Ideal Body Weight (IBW) (kg): 64.15  EMR wts:  Weight Weight (kg) Weight (lbs) Weight Method VISIT REPORT   2/11/2022 54.885 kg 121 lb Standing scale    2/13/2022 59.693 kg 131 lb 9.6 oz Bed scale    2/23/2022 63.504 kg 140 lb     2/25/2022 63.957 kg 141 lb     3/12/2022 56.7 kg 125 lb     3/13/2022 55.248 kg 121 lb 12.8 oz Bed scale    3/14/2022 53.978 kg 119 lb Bed scale    3/15/2022 60.555 kg 133 lb 8 oz Bed scale    3/16/2022 57.879 kg 127 lb 9.6 oz     3/17/2022 57.698 kg 127 lb 3.2 oz Bed scale    3/18/2022 58.287 kg 128 lb 8 oz Bed scale    3/24/2022 54.885 kg 121 lb     3/26/2022 54.885 kg 121 lb Stated    3/26/2022 53.615 kg 118 " lb 3.2 oz Bed scale    2022 63.504 kg 140 lb     2022 57.153 kg 126 lb     2022 54.432 kg 120 lb Stated    2022 54.613 kg 120 lb 6.4 oz Standing scale    2022 59.24 kg 130 lb 9.6 oz     2022 61.236 kg 135 lb     2022 60.963 kg 134 lb 6.4 oz     2022 53.524 kg 118 lb Stated    2022 60.328 kg 133 lb Estimated    2022 64.501 kg 142 lb 3.2 oz Standing scale    2023 71.668 kg 158 lb     2023 71.668 kg 158 lb     2/3/2023 71.668 kg 158 lb Stated    2/3/2023 59.058 kg 130 lb 3.2 oz Standing scale      UBW: 139# per pt  Weight change: 12# wt loss from EMR wt (22-standing scale)  % change:  8.4%  Time frame: ~2 months     Current Nutrition Prescription     Diet: Gastrointestinal Diets, Diabetic Diets; Consistent Carbohydrate; Fiber-Restricted; Texture: Regular Texture (IDDSI 7); Fluid Consistency: Thin (IDDSI 0)    ONS: Boost Glucose Control BID    Average Intake from [place]% x 3 meals (2 meals documented at 0%)    Intake History:     Intake Category  N/A to timeframe criteria at this time    Actions   Appropriateness for MSA:  Malnutrition exam warranted based on observation, RDN to assess/evaluate per protocol    Interventions:   Follow treatment progress, Care plan reviewed, Menu provided, Encourage intake, Supplement provided    Tika Szymanski,   Time Spent: 25 min

## 2023-02-05 NOTE — PLAN OF CARE
Goal Outcome Evaluation:  Plan of Care Reviewed With: patient           Outcome Evaluation: Pt has had c/o nausea this shift. She has ate minimal amounts of meals. No c/o pain. Pt has received x2 dose of labetolol for htn. Dr. Nelson aware. Pt has had good uop this shift. Pt did have another episode where she did not eaasily arouse. Dr. Nelson paged and arrived at bedside. Pt immediately woke and spoke to Dr. Nelson. Pt states she was not aware that staff had attempted to arouse her. Pt is now fully A/O.

## 2023-02-06 ENCOUNTER — HOME HEALTH ADMISSION (OUTPATIENT)
Dept: HOME HEALTH SERVICES | Facility: HOME HEALTHCARE | Age: 63
End: 2023-02-06
Payer: COMMERCIAL

## 2023-02-06 ENCOUNTER — READMISSION MANAGEMENT (OUTPATIENT)
Dept: CALL CENTER | Facility: HOSPITAL | Age: 63
End: 2023-02-06
Payer: COMMERCIAL

## 2023-02-06 VITALS
WEIGHT: 130.2 LBS | HEIGHT: 68 IN | DIASTOLIC BLOOD PRESSURE: 99 MMHG | TEMPERATURE: 98.3 F | BODY MASS INDEX: 19.73 KG/M2 | HEART RATE: 82 BPM | OXYGEN SATURATION: 100 % | RESPIRATION RATE: 16 BRPM | SYSTOLIC BLOOD PRESSURE: 148 MMHG

## 2023-02-06 LAB
ANION GAP SERPL CALCULATED.3IONS-SCNC: 12 MMOL/L (ref 5–15)
BUN SERPL-MCNC: 16 MG/DL (ref 8–23)
BUN/CREAT SERPL: 15.4 (ref 7–25)
CALCIUM SPEC-SCNC: 8.9 MG/DL (ref 8.6–10.5)
CHLORIDE SERPL-SCNC: 106 MMOL/L (ref 98–107)
CO2 SERPL-SCNC: 24 MMOL/L (ref 22–29)
CREAT SERPL-MCNC: 1.04 MG/DL (ref 0.57–1)
EGFRCR SERPLBLD CKD-EPI 2021: 60.1 ML/MIN/1.73
GLUCOSE BLDC GLUCOMTR-MCNC: 151 MG/DL (ref 70–130)
GLUCOSE BLDC GLUCOMTR-MCNC: 170 MG/DL (ref 70–130)
GLUCOSE BLDC GLUCOMTR-MCNC: 213 MG/DL (ref 70–130)
GLUCOSE BLDC GLUCOMTR-MCNC: 228 MG/DL (ref 70–130)
GLUCOSE BLDC GLUCOMTR-MCNC: 250 MG/DL (ref 70–130)
GLUCOSE SERPL-MCNC: 165 MG/DL (ref 65–99)
POTASSIUM SERPL-SCNC: 3.8 MMOL/L (ref 3.5–5.2)
SODIUM SERPL-SCNC: 142 MMOL/L (ref 136–145)

## 2023-02-06 PROCEDURE — 80048 BASIC METABOLIC PNL TOTAL CA: CPT | Performed by: STUDENT IN AN ORGANIZED HEALTH CARE EDUCATION/TRAINING PROGRAM

## 2023-02-06 PROCEDURE — 63710000001 INSULIN LISPRO (HUMAN) PER 5 UNITS: Performed by: STUDENT IN AN ORGANIZED HEALTH CARE EDUCATION/TRAINING PROGRAM

## 2023-02-06 PROCEDURE — 99239 HOSP IP/OBS DSCHRG MGMT >30: CPT | Performed by: INTERNAL MEDICINE

## 2023-02-06 PROCEDURE — 97161 PT EVAL LOW COMPLEX 20 MIN: CPT | Performed by: PHYSICAL THERAPIST

## 2023-02-06 PROCEDURE — 82962 GLUCOSE BLOOD TEST: CPT

## 2023-02-06 PROCEDURE — 63710000001 PROCHLORPERAZINE MALEATE PER 5 MG: Performed by: INTERNAL MEDICINE

## 2023-02-06 PROCEDURE — G0378 HOSPITAL OBSERVATION PER HR: HCPCS

## 2023-02-06 RX ORDER — AMLODIPINE BESYLATE 10 MG/1
10 TABLET ORAL
Qty: 30 TABLET | Refills: 0 | Status: SHIPPED | OUTPATIENT
Start: 2023-02-07 | End: 2023-03-09

## 2023-02-06 RX ADMIN — FLUOXETINE 20 MG: 20 CAPSULE ORAL at 08:21

## 2023-02-06 RX ADMIN — ACETAMINOPHEN 325MG 650 MG: 325 TABLET ORAL at 11:42

## 2023-02-06 RX ADMIN — PANTOPRAZOLE SODIUM 40 MG: 40 TABLET, DELAYED RELEASE ORAL at 08:21

## 2023-02-06 RX ADMIN — AMLODIPINE BESYLATE 10 MG: 5 TABLET ORAL at 08:22

## 2023-02-06 RX ADMIN — CARVEDILOL 6.25 MG: 6.25 TABLET, FILM COATED ORAL at 08:22

## 2023-02-06 RX ADMIN — INSULIN LISPRO 3 UNITS: 100 INJECTION, SOLUTION INTRAVENOUS; SUBCUTANEOUS at 08:18

## 2023-02-06 RX ADMIN — PROCHLORPERAZINE MALEATE 5 MG: 5 TABLET ORAL at 12:58

## 2023-02-06 NOTE — DISCHARGE SUMMARY
[facility] Medicine Services  DISCHARGE SUMMARY    Patient Name: Thelma Michael  : 1960  MRN: [id number]    Date of Admission: 2/3/2023 11:45 AM  Date of Discharge:  2023  Primary Care Physician: Monet Howe APRN    Consults     Date and Time Order Name Status Description    2023  2:15 PM Inpatient Neurology Consult General Completed           Hospital Course     Presenting Problem:   Volume depletion [E86.9]    Active Hospital Problems    Diagnosis  POA   • **Volume depletion [E86.9]  Yes   • Hypokalemia [E87.6]  Yes   • Gastroparesis [K31.84]  Yes   • Uncontrolled type 1 diabetes mellitus with hyperglycemia (HCC) [E10.65]  Yes   • Hypertensive urgency [I16.0]  Yes   • RAFAT (acute kidney injury) (HCC) [N17.9]  Yes      Resolved Hospital Problems   No resolved problems to display.        Hospital Course:  Thelma Michael is a 62 y.o. female with uncontrolled diabetes mellitus type I, gastroparesis, HLD, HTN, migraines, GERD, who presented with intractable n/v.     Intractable nausea and vomiting  Diabetic gastroparesis  Dehydration  -Patient needs to follow-up with motility clinic for gastric stimulator placement as previously scheduled  -s/p supportive therapy with IV fluids, Reglan and Compazine for now     Altered mental status  Episodes of nonresponsiveness  -Etiology remains unknown, suspected medication-induced from gabapentin, mirtazapine, Reglan vs hospital delirium vs conversion disorder  -Neurology evaluated, suspect some underlying conversion disorder  -Work-up that has included MRIs, EEGs have been essentially unremarkable  -Continue Reglan, hold mirtazapine and gabapentin   -She will likely need a psych evaluation as outpatient, defer to pcp     RAFAT, resolved  --Due to above while using losartan.  -losartan has been dc     Hypokalemia  -s/p repletion per protocol     Hypertensive urgency  -BP currently much improved  -Continue Coreg,  started on amlodipine 10 mg daily     Previously well-controlled type 1 diabetes A1c 6.5 12/7/2022  --Uses insulin pump at home.  --Pump battery has run out, currently on low-dose SSI; she is VERY insulin insensitive and so stick with the low-dose SSI  --DM educator followed     Chronic infarct in the right lentiform nucleus  Chronic left cerebellar lacunar infarct  Moderate to severe chronic small vessel ischemic changes  --Follow-up with stroke outpatient  --Continue atorvastatin 80 mg once daily  -- Aspirin 81 mg daily     Right thyroid nodule  Bilateral adrenal nodularity  --Need to discuss patient patient regarding outpatient follow-up and imaging for this     Chronic a fib  --Coreg for rate control. Home Xarelto.    Discharge Follow Up Recommendations for outpatient labs/diagnostics:  F/u with PCP in 1 week    Day of Discharge     HPI: No acute events overnight, patient rested well, denies any more nausea or vomiting, tolerated breakfast this morning.    Review of Systems  Gen- No fevers, chills  CV- No chest pain, palpitations  Resp- No cough, dyspnea  GI- No N/V/D, abd pain    Vital Signs:   Temp:  [97.2 °F (36.2 °C)-99.9 °F (37.7 °C)] 98.3 °F (36.8 °C)  Heart Rate:  [61-94] 82  Resp:  [16-18] 16  BP: (129-215)/() 148/99    Physical Exam:  Constitutional: No acute distress, awake, alert  HENT: NCAT, mucous membranes moist  Respiratory: Clear to auscultation bilaterally, respiratory effort normal   Cardiovascular: RRR, no murmurs, rubs, or gallops  Gastrointestinal: Positive bowel sounds, soft, nontender, nondistended  Musculoskeletal: No bilateral ankle edema  Psychiatric: Appropriate affect, cooperative  Neurologic: nonfocal  Skin: No rashes    Pertinent  and/or Most Recent Results     LAB RESULTS:      Lab 02/05/23  1110 02/04/23  0603 02/04/23  0512 02/03/23  1210   WBC 5.78  --  7.31 10.62   HEMOGLOBIN 10.9*  --  11.0* 14.8   HEMATOCRIT 33.2*  --  32.8* 43.2   PLATELETS 242  --  290 398   NEUTROS  ABS  --   --  4.52 8.40*   IMMATURE GRANS (ABS)  --   --  0.02 0.05   LYMPHS ABS  --   --  2.25 1.68   MONOS ABS  --   --  0.50 0.48   EOS ABS  --   --  0.01 0.00   MCV 92.5  --  90.6 88.2   PROCALCITONIN  --   --  0.31*  --    LACTATE  --  0.9  --  1.3         Lab 02/06/23  1038 02/05/23  1110 02/04/23  0512 02/03/23  1210   SODIUM 142 141 144 142   POTASSIUM 3.8 3.1* 3.1* 3.1*   CHLORIDE 106 109* 111* 100   CO2 24.0 20.0* 24.0 25.0   ANION GAP 12.0 12.0 9.0 17.0*   BUN 16 20 51* 56*   CREATININE 1.04* 1.01* 2.08* 2.19*   EGFR 60.1 62.3 26.2* 24.6*   GLUCOSE 165* 157* 109* 169*   CALCIUM 8.9 8.2* 8.3* 9.9         Lab 02/05/23  1110 02/03/23  1210   TOTAL PROTEIN 5.6* 8.3   ALBUMIN 3.3* 4.7   GLOBULIN 2.3 3.6   ALT (SGPT) 10 14   AST (SGOT) 15 24   BILIRUBIN 0.5 0.6   ALK PHOS 59 95   LIPASE  --  22         Lab 02/04/23  0512   TROPONIN T 0.012             Lab 02/04/23  1130   ABO TYPING B   RH TYPING Positive   ANTIBODY SCREEN Negative         Lab 02/04/23  0539   FIO2 21   CARBOXYHEMOGLOBIN (VENOUS) 1.2     Brief Urine Lab Results  (Last result in the past 365 days)      Color   Clarity   Blood   Leuk Est   Nitrite   Protein   CREAT   Urine HCG        02/03/23 1332 Yellow   Clear   Small (1+)   Negative   Negative   >=300 mg/dL (3+)               Microbiology Results (last 10 days)     Procedure Component Value - Date/Time    Blood Culture - Blood, Hand, Right [[id number]]  (Normal) Collected: 02/04/23 0649    Lab Status: Preliminary result Specimen: Blood from Hand, Right Updated: 02/06/23 0830     Blood Culture No growth at 2 days    Narrative:      Aerobic bottle only      Blood Culture - Blood, Arm, Right [[id number]]  (Normal) Collected: 02/04/23 0649    Lab Status: Preliminary result Specimen: Blood from Arm, Right Updated: 02/06/23 0830     Blood Culture No growth at 2 days    Narrative:      Aerobic bottle only      COVID PRE-OP / PRE-PROCEDURE SCREENING ORDER (NO ISOLATION) - Swab, Nasopharynx [[id number]]   (Normal) Collected: 02/04/23 0611    Lab Status: Final result Specimen: Swab from Nasopharynx Updated: 02/04/23 0752    Narrative:      The following orders were created for panel order COVID PRE-OP / PRE-PROCEDURE SCREENING ORDER (NO ISOLATION) - Swab, Nasopharynx.  Procedure                               Abnormality         Status                     ---------                               -----------         ------                     Respiratory Panel PCR w/...[[id number]]  Normal              Final result                 Please view results for these tests on the individual orders.    Respiratory Panel PCR w/COVID-19(SARS-CoV-2) NORMA/JUAN ALBERTO/EDY/PAD/COR/MAD/ROSALINA In-House, NP Swab in UTM/VTM, 3-4 HR TAT - Swab, Nasopharynx [[id number]]  (Normal) Collected: 02/04/23 0611    Lab Status: Final result Specimen: Swab from Nasopharynx Updated: 02/04/23 0752     ADENOVIRUS, PCR Not Detected     Coronavirus 229E Not Detected     Coronavirus HKU1 Not Detected     Coronavirus NL63 Not Detected     Coronavirus OC43 Not Detected     COVID19 Not Detected     Human Metapneumovirus Not Detected     Human Rhinovirus/Enterovirus Not Detected     Influenza A PCR Not Detected     Influenza B PCR Not Detected     Parainfluenza Virus 1 Not Detected     Parainfluenza Virus 2 Not Detected     Parainfluenza Virus 3 Not Detected     Parainfluenza Virus 4 Not Detected     RSV, PCR Not Detected     Bordetella pertussis pcr Not Detected     Bordetella parapertussis PCR Not Detected     Chlamydophila pneumoniae PCR Not Detected     Mycoplasma pneumo by PCR Not Detected    Narrative:      In the setting of a positive respiratory panel with a viral infection PLUS a negative procalcitonin without other underlying concern for bacterial infection, consider observing off antibiotics or discontinuation of antibiotics and continue supportive care. If the respiratory panel is positive for atypical bacterial infection (Bordetella pertussis, Chlamydophila  pneumoniae, or Mycoplasma pneumoniae), consider antibiotic de-escalation to target atypical bacterial infection.          EEG    Result Date: 2/5/2023  Reason for referral: 62 y.o.female with altered mental status, consideration of seizure Technical Summary:  A 19 channel digital EEG was performed using the international 10-20 placement system, including eye leads and EKG leads. Duration: 22-minute Findings: The awake tracing shows diffuse low to medium amplitude intermixed theta and alpha activity which is present symmetrically over both hemispheres.  A clear posterior rhythm is not seen.  Photic stimulation does not change the background.  Sleep is not seen.  Hyperventilation is not performed.  No focal features or epileptiform activity are seen Video: On Technical quality: Superior EKG: Regular, approximately 70 bpm SUMMARY: Normal EEG in the awake state No focal features or epileptiform activity are seen     Normal study This report is transcribed using the Dragon dictation system.      CT Abdomen Pelvis Without Contrast    Result Date: 2/4/2023  EXAMINATION: 1.  CT CHEST WO CONTRAST DIAGNOSTIC 2.  CT ABDOMEN PELVIS WO CONTRAST   DATE OF EXAM: 2/4/2023 6:18 AM HISTORY: Sepsis, unclear source. COMPARISON: CT abdomen/pelvis without contrast dated 9/20/2022. TECHNIQUE: CT examination of the chest, abdomen and pelvis was performed without intravenous contrast. Axial, coronal, and sagittal images provided for review. CT dose lowering techniques were used, to include: automated exposure control, adjustment for patient size, and/or use of iterative reconstruction. Note: The exam is limited because some types of pathology may not be adequately demonstrated due to lack of contrast enhancement. FINDINGS: CHEST: Lungs:  Mild biapical scarring. Very mild subpleural emphysematous changes towards the apices. No suspicious consolidation. Subpleural 3 mm calcified granuloma at the left lower lobe. Pleura: Normal.  Cardiovascular: Normal cardiac size. No pericardial effusion. Mild-to-moderate atherosclerotic calcifications of the aortic arch. Mild coronary artery calcifications. Mediastinum And Mari: Small left perihilar calcifications could reflect small calcified lymph nodes. No pathologically enlarged lymph nodes identified in the chest. Hypodense nodule measuring 1.4 cm in the right adrenal gland. Chest Wall:  Normal. Chest Musculoskeletal: Reverse S shaped scoliotic curvature of the cervicothoracic spine. Possible osteopenia. No acute osseous abnormality identified. ABDOMEN/PELVIS: Liver: Normal. Gallbladder/Biliary: Question faint hyperdensity which could reflect sludge or layering noncalcified stones. Pancreas: Normal. Spleen: Normal. Adrenal Glands: Stable low density nodularity and bilateral adrenal glands suggests adenomas or hyperplasia. Kidneys: Normal. Bladder: Urinary bladder demonstrates prominent trabeculated wall. A prominent outpouching is present at the bladder dome compatible with relatively wide mouth diverticulum which measures 4.9 cm in the axial plane. Appearance of the urinary bladder is similar to prior exam. Mesentery/Peritoneum: No free air or free fluid. GI Tract: No bowel obstruction. Appendix is normal. Scattered colonic diverticula without obvious focal diverticulitis. Diverticula are most numerous at the sigmoid colon. Many portions of the colon are underdistended limiting evaluation. Reproductive: Normal. Lymph Nodes: Normal. Vasculature: Moderate aortobiiliac atherosclerotic calcifications. Normal caliber of aorta and IVC. Abdominal Wall: Normal. Abdomen Musculoskeletal: Possible osteopenia. Degenerative changes of the lumbosacral junction. No acute osseous abnormality.     1.  No acute pathology identified on noncontrast evaluation of the chest, abdomen, or pelvis. 2.  Possible gallbladder sludge or layering noncalcified stones. 3.  Colonic diverticulosis without evidence of diverticulitis.  4.  Urinary bladder wall is prominent and trabeculated. Prominent diverticula at bladder dome. Findings are unchanged from prior exam. Correlate with patient history. Potentially related to chronic urinary retention or cystitis. 5.  Stable low density bilateral adrenal nodularity suggests adenomas or hyperplasia. 6.  Evidence of prior granulomatous disease in the left chest. 7.  Low density right thyroid gland nodule. Nonemergent thyroid ultrasound can be obtained for further evaluation as clinically indicated. 8.  Other findings as above. Electronically signed by:  Pedro Blum D.O.  2/4/2023 5:30 AM Mountain Time    CT Chest Without Contrast Diagnostic    Result Date: 2/4/2023  EXAMINATION: 1.  CT CHEST WO CONTRAST DIAGNOSTIC 2.  CT ABDOMEN PELVIS WO CONTRAST   DATE OF EXAM: 2/4/2023 6:18 AM HISTORY: Sepsis, unclear source. COMPARISON: CT abdomen/pelvis without contrast dated 9/20/2022. TECHNIQUE: CT examination of the chest, abdomen and pelvis was performed without intravenous contrast. Axial, coronal, and sagittal images provided for review. CT dose lowering techniques were used, to include: automated exposure control, adjustment for patient size, and/or use of iterative reconstruction. Note: The exam is limited because some types of pathology may not be adequately demonstrated due to lack of contrast enhancement. FINDINGS: CHEST: Lungs:  Mild biapical scarring. Very mild subpleural emphysematous changes towards the apices. No suspicious consolidation. Subpleural 3 mm calcified granuloma at the left lower lobe. Pleura: Normal. Cardiovascular: Normal cardiac size. No pericardial effusion. Mild-to-moderate atherosclerotic calcifications of the aortic arch. Mild coronary artery calcifications. Mediastinum And Mari: Small left perihilar calcifications could reflect small calcified lymph nodes. No pathologically enlarged lymph nodes identified in the chest. Hypodense nodule measuring 1.4 cm in the right adrenal  gland. Chest Wall:  Normal. Chest Musculoskeletal: Reverse S shaped scoliotic curvature of the cervicothoracic spine. Possible osteopenia. No acute osseous abnormality identified. ABDOMEN/PELVIS: Liver: Normal. Gallbladder/Biliary: Question faint hyperdensity which could reflect sludge or layering noncalcified stones. Pancreas: Normal. Spleen: Normal. Adrenal Glands: Stable low density nodularity and bilateral adrenal glands suggests adenomas or hyperplasia. Kidneys: Normal. Bladder: Urinary bladder demonstrates prominent trabeculated wall. A prominent outpouching is present at the bladder dome compatible with relatively wide mouth diverticulum which measures 4.9 cm in the axial plane. Appearance of the urinary bladder is similar to prior exam. Mesentery/Peritoneum: No free air or free fluid. GI Tract: No bowel obstruction. Appendix is normal. Scattered colonic diverticula without obvious focal diverticulitis. Diverticula are most numerous at the sigmoid colon. Many portions of the colon are underdistended limiting evaluation. Reproductive: Normal. Lymph Nodes: Normal. Vasculature: Moderate aortobiiliac atherosclerotic calcifications. Normal caliber of aorta and IVC. Abdominal Wall: Normal. Abdomen Musculoskeletal: Possible osteopenia. Degenerative changes of the lumbosacral junction. No acute osseous abnormality.     1.  No acute pathology identified on noncontrast evaluation of the chest, abdomen, or pelvis. 2.  Possible gallbladder sludge or layering noncalcified stones. 3.  Colonic diverticulosis without evidence of diverticulitis. 4.  Urinary bladder wall is prominent and trabeculated. Prominent diverticula at bladder dome. Findings are unchanged from prior exam. Correlate with patient history. Potentially related to chronic urinary retention or cystitis. 5.  Stable low density bilateral adrenal nodularity suggests adenomas or hyperplasia. 6.  Evidence of prior granulomatous disease in the left chest. 7.  Low  density right thyroid gland nodule. Nonemergent thyroid ultrasound can be obtained for further evaluation as clinically indicated. 8.  Other findings as above. Electronically signed by:  Pedro Blum D.O.  2/4/2023 5:30 AM Mountain Time    MRI Angiogram Head Without Contrast    Result Date: 2/4/2023  MRI BRAIN WO CONTRAST, MRI ANGIOGRAM HEAD WO CONTRAST, MRI ANGIOGRAM NECK WO CONTRAST Date of Exam: 2/4/2023 2:11 PM EST Indication: Mental status change, unknown cause.  Comparison: CT head earlier the same day Technique:  Multiplanar multisequence MRI of the brain performed without IV contrast. Noncontrast time-of-flight 3-dimensional MR angiogram of the head and neck with three-dimensional maximum intensity projections postprocessed Findings: MRI brain: No acute infarct is present on diffusion weighted sequences. Midline structures are normal in the cranial cervical junction is satisfactory in appearance. Fairly advanced age-related changes are present with extensive T2 hyperintense periventricular and pontine white matter changes , likely reflecting advanced chronic microvascular ischemia. There is otherwise no evidence of intracranial hemorrhage, mass or mass effect. The ventricles are normal in size and configuration. The orbits are normal. The paranasal sinuses are grossly clear. Intracranial arterial flow voids are maintained. MR angiogram: The carotid siphons demonstrate expected flow related signal. The anterior cerebral arteries are normal in course and caliber bilaterally. The right middle cerebral artery demonstrates no evidence of flow-limiting stenosis, large vessel occlusion or aneurysmal dilatation. The left middle cerebral artery is similarly normal in course and caliber. The vertebrobasilar system is patent. The posterior cerebral arteries are normal in course and caliber bilaterally. Additional time-of-flight MR angiogram of the neck demonstrates expected flow related signal through the right ICA  origin, with 0% atherosclerotic narrowing by NASCET criteria. Similar 0% narrowing is present on the left. The vertebral arteries are normal in course and caliber bilaterally.     Impression: There is no evidence of acute infarct, hemorrhage, mass or mass effect. There are redemonstrated fairly extensive and confluent periventricular and pontine white matter changes, again favored to reflect sequela of chronic microvascular ischemia. Essentially normal MR angiogram of the head and neck. There is no evidence of flow-limiting stenosis, large vessel occlusion or aneurysmal dilatation. Electronically Signed: Arya Muñoz  2/4/2023 3:08 PM EST  Workstation ID: [id number]    MRI Angiogram Neck Without Contrast    Result Date: 2/4/2023  MRI BRAIN WO CONTRAST, MRI ANGIOGRAM HEAD WO CONTRAST, MRI ANGIOGRAM NECK WO CONTRAST Date of Exam: 2/4/2023 2:11 PM EST Indication: Mental status change, unknown cause.  Comparison: CT head earlier the same day Technique:  Multiplanar multisequence MRI of the brain performed without IV contrast. Noncontrast time-of-flight 3-dimensional MR angiogram of the head and neck with three-dimensional maximum intensity projections postprocessed Findings: MRI brain: No acute infarct is present on diffusion weighted sequences. Midline structures are normal in the cranial cervical junction is satisfactory in appearance. Fairly advanced age-related changes are present with extensive T2 hyperintense periventricular and pontine white matter changes , likely reflecting advanced chronic microvascular ischemia. There is otherwise no evidence of intracranial hemorrhage, mass or mass effect. The ventricles are normal in size and configuration. The orbits are normal. The paranasal sinuses are grossly clear. Intracranial arterial flow voids are maintained. MR angiogram: The carotid siphons demonstrate expected flow related signal. The anterior cerebral arteries are normal in course and caliber bilaterally. The  right middle cerebral artery demonstrates no evidence of flow-limiting stenosis, large vessel occlusion or aneurysmal dilatation. The left middle cerebral artery is similarly normal in course and caliber. The vertebrobasilar system is patent. The posterior cerebral arteries are normal in course and caliber bilaterally. Additional time-of-flight MR angiogram of the neck demonstrates expected flow related signal through the right ICA origin, with 0% atherosclerotic narrowing by NASCET criteria. Similar 0% narrowing is present on the left. The vertebral arteries are normal in course and caliber bilaterally.     Impression: There is no evidence of acute infarct, hemorrhage, mass or mass effect. There are redemonstrated fairly extensive and confluent periventricular and pontine white matter changes, again favored to reflect sequela of chronic microvascular ischemia. Essentially normal MR angiogram of the head and neck. There is no evidence of flow-limiting stenosis, large vessel occlusion or aneurysmal dilatation. Electronically Signed: Arya Muñoz  2/4/2023 3:08 PM EST  Workstation ID: [id number]    MRI Brain Without Contrast    Result Date: 2/4/2023  MRI BRAIN WO CONTRAST, MRI ANGIOGRAM HEAD WO CONTRAST, MRI ANGIOGRAM NECK WO CONTRAST Date of Exam: 2/4/2023 2:11 PM EST Indication: Mental status change, unknown cause.  Comparison: CT head earlier the same day Technique:  Multiplanar multisequence MRI of the brain performed without IV contrast. Noncontrast time-of-flight 3-dimensional MR angiogram of the head and neck with three-dimensional maximum intensity projections postprocessed Findings: MRI brain: No acute infarct is present on diffusion weighted sequences. Midline structures are normal in the cranial cervical junction is satisfactory in appearance. Fairly advanced age-related changes are present with extensive T2 hyperintense periventricular and pontine white matter changes , likely reflecting advanced chronic  microvascular ischemia. There is otherwise no evidence of intracranial hemorrhage, mass or mass effect. The ventricles are normal in size and configuration. The orbits are normal. The paranasal sinuses are grossly clear. Intracranial arterial flow voids are maintained. MR angiogram: The carotid siphons demonstrate expected flow related signal. The anterior cerebral arteries are normal in course and caliber bilaterally. The right middle cerebral artery demonstrates no evidence of flow-limiting stenosis, large vessel occlusion or aneurysmal dilatation. The left middle cerebral artery is similarly normal in course and caliber. The vertebrobasilar system is patent. The posterior cerebral arteries are normal in course and caliber bilaterally. Additional time-of-flight MR angiogram of the neck demonstrates expected flow related signal through the right ICA origin, with 0% atherosclerotic narrowing by NASCET criteria. Similar 0% narrowing is present on the left. The vertebral arteries are normal in course and caliber bilaterally.     Impression: There is no evidence of acute infarct, hemorrhage, mass or mass effect. There are redemonstrated fairly extensive and confluent periventricular and pontine white matter changes, again favored to reflect sequela of chronic microvascular ischemia. Essentially normal MR angiogram of the head and neck. There is no evidence of flow-limiting stenosis, large vessel occlusion or aneurysmal dilatation. Electronically Signed: Arya Muñoz  2/4/2023 3:08 PM EST  Workstation ID: [id number]    CT Head Without Contrast Stroke Protocol    Result Date: 2/4/2023  EXAMINATION:  CT HEAD WO CONTRAST STROKE PROTOCOL DATE: 2/4/2023 6:14 AM  INDICATION:  Altered mental status.  COMPARISON: Head CT October 31, 2019.  TECHNIQUE:  Routine axial images through the head without contrast. Low-dose CT acquisition technique included one or more of the following options: 1. Automated exposure control, 2.  Adjustment of mA and/or KV according to patient's size and/or 3. Use of iterative reconstruction. FINDINGS:  Intracranial Contents: Mild volume loss. Chronic infarcts in the left cerebellum and right basal ganglia. Confluent hypodensities in the periventricular white matter, most consistent with chronic small vessel ischemic changes. No acute intracranial hemorrhage. No mass effect, midline shift, hydrocephalus, herniation, or extra axial fluid collection.  Bones and Extracranial Soft Tissues: The calvarium is intact. Normal extracranial soft tissues. Minimal mucosal thickening ethmoid air cells. The remaining paranasal sinuses and mastoid air cells are well aerated. No acute abnormality of the orbits or globes. Distal internal carotid artery atherosclerotic calcifications.     1. No acute intracranial abnormality. MRI is more sensitive for evaluation of acute ischemia within the first 24 to 48 hours. 2. Volume loss. Moderate to severe chronic small vessel ischemic changes. Chronic infarct in the right lentiform nucleus which is at least new compared to the head CT in 2019. Chronic left cerebellar lacunar infarct. I communicated these results by phone to  Mariajose Awad at 4:36 AM Mountain time on 2/4/2023. The results were communicated back and were said to be understood. Electronically signed by:  Terry Lizama M.D.  2/4/2023 4:53 AM Mountain Time              Results for orders placed during the hospital encounter of 12/06/22    Adult Transthoracic Echo Complete W/ Cont if Necessary Per Protocol    Interpretation Summary  •  Left ventricular ejection fraction appears to be greater than 70%.  •  Left ventricular wall thickness is consistent with moderate concentric hypertrophy.  •  Left ventricular diastolic function is consistent with (grade I) impaired relaxation.  •  Estimated right ventricular systolic pressure from tricuspid regurgitation is mildly elevated (35-45 mmHg). Calculated right ventricular systolic  pressure from tricuspid regurgitation is 39 mmHg.      Plan for Follow-up of Pending Labs/Results:   Pending Labs     Order Current Status    Blood Culture - Blood, Arm, Right Preliminary result    Blood Culture - Blood, Hand, Right Preliminary result        Discharge Details        Discharge Medications      New Medications      Instructions Start Date   amLODIPine 10 MG tablet  Commonly known as: NORVASC   10 mg, Oral, Every 24 Hours Scheduled   Start Date: February 7, 2023        Continue These Medications      Instructions Start Date   acetaminophen 325 MG tablet  Commonly known as: TYLENOL   650 mg, Oral, Every 4 Hours PRN      albuterol sulfate  (90 Base) MCG/ACT inhaler  Commonly known as: PROVENTIL HFA;VENTOLIN HFA;PROAIR HFA   2 puffs, Inhalation, Every 4 Hours PRN      atorvastatin 80 MG tablet  Commonly known as: LIPITOR   80 mg, Oral, Nightly      calcium carbonate 500 MG chewable tablet  Commonly known as: TUMS   2 tablets, Oral, 3 Times Daily PRN      carvedilol 6.25 MG tablet  Commonly known as: COREG   6.25 mg, Oral, 2 Times Daily With Meals      Dexcom G6 Transmitter misc   1 each, Does not apply, Every 3 Months      diphenhydrAMINE 25 MG tablet  Commonly known as: BENADRYL   25 mg, Oral, Every 6 Hours PRN      donepezil 5 MG tablet  Commonly known as: Aricept   5 mg, Oral, Nightly      doxazosin 2 MG tablet  Commonly known as: CARDURA   2 mg, Oral, Nightly      estradiol 0.1 MG/GM vaginal cream  Commonly known as: ESTRACE VAGINAL   Insert 1 gm intravaginally twice weekly at bedtime.      ferrous sulfate 325 (65 Fe) MG tablet   TAKE 1 TABLET BY MOUTH ONCE DAILY WITH  BREAKFAST.  TAKE  WITH  ORANGE  JUICE  OR  VITAMIN  C      FLUoxetine 20 MG capsule  Commonly known as: PROzac   20 mg, Oral, Daily      glucose monitor monitoring kit   1 each, Does not apply, 4 Times Daily      melatonin 5 MG tablet tablet   5 mg, Oral, Nightly PRN      Multivitamin tablet tablet  Generic drug: multivitamin   1  tablet, Oral, Daily      Multivitamin-Minerals tablet   1 tablet, Oral, Daily      pantoprazole 40 MG EC tablet  Commonly known as: PROTONIX   40 mg, Oral, Daily      rivaroxaban 15 MG tablet  Commonly known as: XARELTO   15 mg, Oral, Daily With Dinner      sennosides-docusate 8.6-50 MG per tablet  Commonly known as: PERICOLACE   2 tablets, Oral, 2 Times Daily PRN      traMADol 50 MG tablet  Commonly known as: ULTRAM   50 mg, Oral, Every 6 Hours PRN      traZODone 50 MG tablet  Commonly known as: DESYREL    mg, Oral, Nightly PRN, for sleep      VITAMIN C PO   Vitamin C TABS      vitamin D 1.25 MG (23928 UT) capsule capsule  Commonly known as: ERGOCALCIFEROL   50,000 Units, Oral, Weekly         Stop These Medications    gabapentin 400 MG capsule  Commonly known as: NEURONTIN     losartan 50 MG tablet  Commonly known as: COZAAR     mirtazapine 15 MG tablet  Commonly known as: REMERON            No Known Allergies    Discharge Disposition:home  Home or Self Care  Diet:  Hospital:  Diet Order   Procedures   • Diet: Gastrointestinal Diets, Diabetic Diets; Consistent Carbohydrate; Fiber-Restricted; Texture: Regular Texture (IDDSI 7); Fluid Consistency: Thin (IDDSI 0)       Activity:as tolearted  Activity Instructions    AS Tolerated         Restrictions or Other Recommendations:  none       CODE STATUS:    Code Status and Medical Interventions:   Ordered at: 02/03/23 1338     Code Status (Patient has no pulse and is not breathing):    CPR (Attempt to Resuscitate)     Medical Interventions (Patient has pulse or is breathing):    Full Support       Future Appointments   Date Time Provider Department Center   2/8/2023 10:30 AM Ricarda Vela APRN [facility]   3/13/2023  8:30 AM Ludwig Mitchell MD [facility]   3/15/2023  1:45 PM Gerson Ochoa MD [facility]   3/16/2023  1:45 PM Monet Howe APRN [facility]   3/27/2023 11:15 AM Clara Rojas APRN [facility]        Additional Instructions for the Follow-ups that You Need to Schedule     Ambulatory Referral to Home Health   As directed      Face to Face Visit Date: 2/6/2023    Follow-up provider for Plan of Care?: I treated the patient in an acute care facility and will not continue treatment after discharge.    Follow-up provider: MARKY JUAREZ [[id number]]    Reason/Clinical Findings: Pt functioning below baseline d/t weakness and decreased activity tolerance.    Describe mobility limitations that make leaving home difficult: Impaired Functional Mobility, Gait, Balance, and Endurance    Nursing/Therapeutic Services Requested: Physical Therapy Occupational Therapy    PT orders: Therapeutic exercise Gait Training Transfer training Strengthening Home safety assessment    Weight Bearing Status: As Tolerated    Occupational orders: Activities of daily living Energy conservation Strengthening Home safety assessment    Frequency: 1 Week 1               Chris Medrano MD  02/06/23    Time Spent on Discharge:  I spent  35  minutes on this discharge activity which included: face-to-face encounter with the patient, reviewing the data in the system, coordination of the care with the nursing staff as well as consultants, documentation, and entering orders.

## 2023-02-06 NOTE — CASE MANAGEMENT/SOCIAL WORK
Case Management Discharge Note      Final Note: Pt is being discharged home today. I spoke with pt at the bedside. PT/OT recommends Home with Home Health. Pt is agreeable. She does not have a particular HH agency. She is agreeable with [facility]. I spoke with Ashlee/AMEE, they can accept pt. HH orders are in [facility]. Pt's daughter will be transporting her home. No other needs voiced or identified.         Selected Continued Care - Admitted Since 2/3/2023     Destination    No services have been selected for the patient.              Durable Medical Equipment    No services have been selected for the patient.              Dialysis/Infusion    No services have been selected for the patient.              Home Medical Care     Service Provider Selected Services Address Phone Fax Patient Preferred    [facility] [id number] [facility] [address] [phone] --          Therapy    No services have been selected for the patient.              Community Resources    No services have been selected for the patient.              Community & DME    No services have been selected for the patient.                       Final Discharge Disposition Code: 06 - home with home health care

## 2023-02-06 NOTE — PLAN OF CARE
Goal Outcome Evaluation:            Pt VSS. Pt put back on her insulin pump and then sugar bottomed out and I took insulin pump off. Blood sugar dropped to 41. Dextrose given. Blood sugars are stable now. Will continue with POC.

## 2023-02-06 NOTE — PLAN OF CARE
Goal Outcome Evaluation:  Plan of Care Reviewed With: patient           Outcome Evaluation: PT evaluation completed.  Pt transferred supine<-->sit modified independently, stood with SBA, and ambulated 136 feet using rw with CGAx1.  Pt functioning below baseline d/t weakness and decreased activity tolerance.  Recommend home with assist and HH PT at d/c.

## 2023-02-06 NOTE — THERAPY EVALUATION
Patient Name: Thelma Michael  : 1960    MRN: [id number]                              Today's Date: 2023       Admit Date: 2/3/2023    Visit Dx:     ICD-10-CM ICD-9-CM   1. Volume depletion  E86.9 276.50   2. Acute renal failure, unspecified acute renal failure type (HCC)  N17.9 584.9   3. Gastroparesis  K31.84 536.3   4. Acute abdominal pain  R10.9 789.00     338.19     Patient Active Problem List   Diagnosis   • Gastroesophageal reflux disease   • Diabetic peripheral neuropathy (HCC)   • Hyperlipidemia   • Hypertension   • Vitamin D deficiency   • Osteoporosis   • Tobacco use   • Heart valve disease   • History of sepsis   • Migraines   • Depression with anxiety   • Primary insomnia   • Iron deficiency anemia secondary to inadequate dietary iron intake   • RAFAT (acute kidney injury) ([facility])   • Hypertensive urgency   • Uncontrolled type 1 diabetes mellitus with hyperglycemia ([facility])   • History of TIAs   • Gastroparesis   • Hypokalemia   • Elevated serum protein level   • Moderate malnutrition (CMS/HCC)   • PFO (patent foramen ovale)   • Atrial fibrillation and flutter (HCC)   • Volume depletion     Past Medical History:   Diagnosis Date   • Acid reflux    • Acute bronchitis    • Cardiac murmur    • Diabetes mellitus ([facility])    • H/O echocardiogram 2012    i. LVEF 65%.ii. Mild LVH.iii. Borderline evidence of atrial septal aneurysm.  No PFO.    • History of nuclear stress test 2014    Negative for ischemia and scars; LVEF 77%.     • Hyperlipidemia    • Hypertension    • Impacted cerumen of both ears    • Migraine    • Self-catheterizes urinary bladder    • Sinusitis    • Stroke ([facility])    • Tobacco abuse     quit 4 days ago.     • Urticaria      Past Surgical History:   Procedure Laterality Date   • CAPSULE ENDOSCOPY  2021    Procedure: PILLCAM DEPLOYMENT;  Surgeon: Mikael Worthy MD;  Location: [facility] END[facility]COPY;  Service: Gastroenterology;;   • COLONOSCOPY     • COLONOSCOPY N/A  07/27/2021    Procedure: COLONOSCOPY;  Surgeon: Mikael Worthy MD;  Location:  [facility];  Service: Gastroenterology;  Laterality: N/A;   • DENTAL PROCEDURE     • ENDOSCOPY N/A 06/30/2021    Procedure: ESOPHAGOGASTRODUODENOSCOPY;  Surgeon: Brunner, Mark I, MD;  Location:  [facility];  Service: Gastroenterology;  Laterality: N/A;   • ENDOSCOPY N/A 07/27/2021    Procedure: ESOPHAGOGASTRODUODENOSCOPY;  Surgeon: Mikael Worthy MD;  Location:  [facility];  Service: Gastroenterology;  Laterality: N/A;   • ENDOSCOPY WITH JTUBE N/A 03/16/2022    Procedure: ESOPHAGOGASTRODUODENOSCOPY WITH JEJUNAL TUBE INSERTION;  Surgeon: Thom Glover MD;  Location:  [facility];  Service: Gastroenterology;  Laterality: N/A;   • NO PAST SURGERIES     • UPPER GASTROINTESTINAL ENDOSCOPY        General Information     Row Name 02/06/23 0909          Physical Therapy Time and Intention    Document Type evaluation  -LM     Mode of Treatment individual therapy;physical therapy  -LM     Row Name 02/06/23 0909          General Information    Patient Profile Reviewed yes  -LM     Prior Level of Function independent:;all household mobility;gait;ADL's  No AD used  -LM     Existing Precautions/Restrictions fall  -LM     Barriers to Rehab none identified  -LM     Row Name 02/06/23 0909          Living Environment    People in Home child(bronson), adult  Son  -LM     Row Name 02/06/23 0909          Stairs Within Home, Primary    Stairs, Within Home, Primary Pt states you go through the front door, then have to go up 20 stairs to get to her level.  She lives in a house that was made into 2 apartments with the other tenant living on the first level.  -LM     Number of Stairs, Within Home, Primary other (see comments)  20  -LM     Stair Railings, Within Home, Primary none  -LM     Row Name 02/06/23 0909          Cognition    Orientation Status (Cognition) oriented x 4  -LM     Row Name 02/06/23 0909          Safety Issues,  Functional Mobility    Safety Issues Affecting Function (Mobility) safety precaution awareness  -LM     Impairments Affecting Function (Mobility) endurance/activity tolerance;strength  -LM           User Key  (r) = Recorded By, (t) = Taken By, (c) = Cosigned By    Initials Name Provider Type    Stephanie Sanford PT Physical Therapist               Mobility     Row Name 02/06/23 0911          Bed Mobility    Bed Mobility supine-sit;sit-supine  -LM     Supine-Sit [place] (Bed Mobility) modified independence  -LM     Sit-Supine [place] (Bed Mobility) modified independence  -LM     Assistive Device (Bed Mobility) head of bed elevated  -LM     Row Name 02/06/23 0911          Sit-Stand Transfer    Sit-Stand [place] (Transfers) standby assist  -LM     Assistive Device (Sit-Stand Transfers) walker, front-wheeled  -LM     Row Name 02/06/23 0911          Gait/Stairs (Locomotion)    [place] Level (Gait) contact guard;1 person assist;verbal cues  -LM     Assistive Device (Gait) walker, front-wheeled  -LM     Distance in Feet (Gait) 136  -LM     Deviations/Abnormal Patterns (Gait) miguel decreased;gait speed decreased  -LM     Bilateral Gait Deviations forward flexed posture;heel strike decreased  -LM     Comment, (Gait/Stairs) Vc's for upright posture and to stay inside walker.  Pt reports feeling weak and fatigued faster than she expected.  Pt states she has a rolling walker at home and can use that as needed until she feels stronger.  -LM           User Key  (r) = Recorded By, (t) = Taken By, (c) = Cosigned By    Initials Name Provider Type    Stephanie Sanford PT Physical Therapist               Obj/Interventions     Row Name 02/06/23 0913          Range of Motion Comprehensive    General Range of Motion bilateral lower extremity ROM WFL  -LM     Row Name 02/06/23 0913          Strength Comprehensive (MMT)    General Manual Muscle Testing (MMT) Assessment lower extremity strength deficits identified  -LM      Comment, General Manual Muscle Testing (MMT) Assessment BLEs grossly 4/5 throughout  -LM     Row Name 02/06/23 0913          Balance    Balance Assessment sitting static balance;standing static balance;standing dynamic balance  -LM     Static Sitting Balance independent  -LM     Dynamic Sitting Balance independent  -LM     Position, Sitting Balance unsupported;sitting edge of bed  -LM     Static Standing Balance standby assist  -LM     Dynamic Standing Balance contact guard  -LM     Position/Device Used, Standing Balance supported;walker, front-wheeled  -LM     Row Name 02/06/23 0913          Sensory Assessment (Somatosensory)    Sensory Assessment (Somatosensory) LE sensation intact  -LM           User Key  (r) = Recorded By, (t) = Taken By, (c) = Cosigned By    Initials Name Provider Type    LM Stephanie Cervantes, PT Physical Therapist               Goals/Plan     Row Name 02/06/23 0915          Gait Training Goal 1 (PT)    Activity/Assistive Device (Gait Training Goal 1, PT) gait (walking locomotion);assistive device use  -LM     [place] Level (Gait Training Goal 1, PT) independent;modified independence  -LM     Distance (Gait Training Goal 1, PT) 200 feet  -LM     Time Frame (Gait Training Goal 1, PT) long term goal (LTG);10 days  -LM     Row Name 02/06/23 0915          Stairs Goal 1 (PT)    Activity/Assistive Device (Stairs Goal 1, PT) ascending stairs;descending stairs  -LM     [place] Level/Cues Needed (Stairs Goal 1, PT) standby assist  -LM     Number of Stairs (Stairs Goal 1, PT) 12  -LM     Time Frame (Stairs Goal 1, PT) long term goal (LTG);10 days  -LM     Row Name 02/06/23 0915          Therapy Assessment/Plan (PT)    Planned Therapy Interventions (PT) balance training;bed mobility training;gait training;home exercise program;motor coordination training;neuromuscular re-education;patient/family education;postural re-education;ROM (range of motion);stair  training;strengthening;stretching;transfer training  -LM           User Key  (r) = Recorded By, (t) = Taken By, (c) = Cosigned By    Initials Name Provider Type    LM Stephanie Cervantes, [facility] Physical Therapist               Clinical Impression     Row Name 02/06/23 0913          Pain    Pretreatment Pain Rating 0/10 - no pain  -LM     Posttreatment Pain Rating 0/10 - no pain  -LM     Row Name 02/06/23 0913          Plan of Care Review    Plan of Care Reviewed With patient  -LM     Outcome Evaluation PT evaluation completed.  Pt transferred supine<-->sit modified independently, stood with SBA, and ambulated 136 feet using rw with CGAx1.  Pt functioning below baseline d/t weakness and decreased activity tolerance.  Recommend home with assist and HH PT at d/c.  -LM     Row Name 02/06/23 0913          Therapy Assessment/Plan (PT)    Rehab Potential (PT) good, to achieve stated therapy goals  -LM     Criteria for Skilled Interventions Met (PT) yes;meets criteria;skilled treatment is necessary  -     Therapy Frequency (PT) daily  -LM     Row Name 02/06/23 0913          Vital Signs    Pre Systolic BP Rehab 151  -LM     Pre Treatment Diastolic BP 77  -LM     Post Systolic BP Rehab 128  -LM     Post Treatment Diastolic BP 78  -LM     Pretreatment Heart Rate (beats/min) 80  -LM     Posttreatment Heart Rate (beats/min) 85  -LM     Pre SpO2 (%) 100  -LM     O2 Delivery Pre Treatment room air  -LM     Post SpO2 (%) 99  -LM     O2 Delivery Post Treatment room air  -LM     Pre Patient Position Supine  -LM     Post Patient Position Supine  -LM     Row Name 02/06/23 0913          Positioning and Restraints    Pre-Treatment Position in bed  -LM     Post Treatment Position bed  -LM     In Bed fowlers;call light within reach;encouraged to call for assist;notified nsg  -LM           User Key  (r) = Recorded By, (t) = Taken By, (c) = Cosigned By    Initials Name Provider Type    Stephanie Sanford, [facility] Physical Therapist               Outcome  Measures     Row Name 02/06/23 0915          How much help from another person do you currently need...    Turning from your back to your side while in flat bed without using bedrails? 4  -LM     Moving from lying on back to sitting on the side of a flat bed without bedrails? 4  -LM     Moving to and from a bed to a chair (including a wheelchair)? 3  -LM     Standing up from a chair using your arms (e.g., wheelchair, bedside chair)? 3  -LM     Climbing 3-5 steps with a railing? 3  -LM     To walk in hospital room? 3  -LM     AM-PAC 6 Clicks Score (PT) 20  -LM     Highest level of mobility 6 --> Walked 10 steps or more  -LM     Row Name 02/06/23 0915          Functional Assessment    Outcome Measure Options AM-PAC 6 Clicks Basic Mobility (PT)  -           User Key  (r) = Recorded By, (t) = Taken By, (c) = Cosigned By    Initials Name Provider Type    LM Stephanie Cervantes, [facility] Physical Therapist                             Physical Therapy Education     Title: PT OT SLP Therapies (In Progress)     Topic: Physical Therapy (In Progress)     Point: Mobility training (Done)     Learning Progress Summary           Patient Acceptance, E, VU by  at 2/6/2023 0916                   Point: Home exercise program (Not Started)     Learner Progress:  Not documented in this visit.          Point: Body mechanics (Not Started)     Learner Progress:  Not documented in this visit.          Point: Precautions (Done)     Learning Progress Summary           Patient Acceptance, E, VU by  at 2/6/2023 0916                               User Key     Initials Effective Dates Name Provider Type Discipline     06/16/21 -  Stephanie Cervantes PT Physical Therapist PT              PT Recommendation and Plan  Planned Therapy Interventions (PT): balance training, bed mobility training, gait training, home exercise program, motor coordination training, neuromuscular re-education, patient/family education, postural re-education, ROM (range of motion),  stair training, strengthening, stretching, transfer training  Plan of Care Reviewed With: patient  Outcome Evaluation: PT evaluation completed.  Pt transferred supine<-->sit modified independently, stood with SBA, and ambulated 136 feet using rw with CGAx1.  Pt functioning below baseline d/t weakness and decreased activity tolerance.  Recommend home with assist and HH PT at d/c.     Time Calculation:    PT Charges     Row Name 02/06/23 0916             Time Calculation    Start Time 0838  -LM      PT Received On 02/06/23  -LM      PT Goal Re-Cert Due Date 02/16/23  -LM         Untimed Charges    PT Eval/Re-eval Minutes 46  -LM         Total Minutes    Untimed Charges Total Minutes 46  -LM       Total Minutes 46  -LM            User Key  (r) = Recorded By, (t) = Taken By, (c) = Cosigned By    Initials Name Provider Type    Stephanie Sanford, PT Physical Therapist              Therapy Charges for Today     Code Description Service Date Service Provider Modifiers Qty    [id number] HC PT EVAL LOW COMPLEXITY 4 2/6/2023 Stephanie Cervantes, PT GP 1          PT G-Codes  Outcome Measure Options: AM-PAC 6 Clicks Basic Mobility (PT)  AM-PAC 6 Clicks Score (PT): 20  AM-PAC 6 Clicks Score (OT): 22  PT Discharge Summary  Anticipated Discharge Disposition (PT): home with assist, home with home health    Stephanie Cervantes PT  2/6/2023

## 2023-02-06 NOTE — PLAN OF CARE
Goal Outcome Evaluation:      Pt being D/C home with home health. Teaching completed. Daughter is picking her up in a private vehicle

## 2023-02-06 NOTE — PROGRESS NOTES
Spoke with patient she is agreeable to [facility]. Verified PCP. Ashlee LUZ, [facility]-Liaison

## 2023-02-07 ENCOUNTER — TRANSITIONAL CARE MANAGEMENT TELEPHONE ENCOUNTER (OUTPATIENT)
Dept: CALL CENTER | Facility: HOSPITAL | Age: 63
End: 2023-02-07
Payer: COMMERCIAL

## 2023-02-07 NOTE — OUTREACH NOTE
Call Center TCM Note    Flowsheet Row Responses   [facility] patient discharged from? [place]   Does the patient have one of the following disease processes/diagnoses(primary or secondary)? Other   TCM attempt successful? No   Unsuccessful attempts Attempt 2          Autumn Hebert RN    2/7/2023, 13:46 EST

## 2023-02-07 NOTE — OUTREACH NOTE
Call Center TCM Note    Flowsheet Row Responses   [facility] patient discharged from? [place]   Does the patient have one of the following disease processes/diagnoses(primary or secondary)? Other   TCM attempt successful? No  [ verbal consent]   Unsuccessful attempts Attempt 1   Does the patient have an appointment with their PCP within 7 days of discharge? Yes  [23 at 10:30 AM]          Autumn Hebert RN    2023, 09:14 EST

## 2023-02-07 NOTE — OUTREACH NOTE
Prep Survey    Flowsheet Row Responses   [facility] facility patient discharged from? [place]   Is LACE score < 7 ? No   Eligibility [facility]   Date of Admission 02/03/23   Date of Discharge 02/06/23   Discharge Disposition Home or Self Care   Discharge diagnosis Volume depletion   Does the patient have one of the following disease processes/diagnoses(primary or secondary)? Other   Does the patient have Home health ordered? Yes   What is the Home health agency?   [facility]   Is there a DME ordered? No   Prep survey completed? Yes          SAVI GARCIA - Registered Nurse

## 2023-02-08 ENCOUNTER — HOME CARE VISIT (OUTPATIENT)
Dept: HOME HEALTH SERVICES | Facility: HOME HEALTHCARE | Age: 63
End: 2023-02-08
Payer: COMMERCIAL

## 2023-02-08 ENCOUNTER — TRANSITIONAL CARE MANAGEMENT TELEPHONE ENCOUNTER (OUTPATIENT)
Dept: CALL CENTER | Facility: HOSPITAL | Age: 63
End: 2023-02-08
Payer: COMMERCIAL

## 2023-02-08 NOTE — CASE COMMUNICATION
PT SOC visit canceled on 2/8/2023 from [facility].    Reason: Per family, patient is currently hospitalized in [place] undergoing a surgery.       For your records only.   As per home health protocol, MD must be notified of missed/cancelled visits; therefore the prescribed frequency was not met.

## 2023-02-08 NOTE — OUTREACH NOTE
Call Center TCM Note    Flowsheet Row Responses   [facility] patient discharged from? [facility]   Does the patient have one of the following disease processes/diagnoses(primary or secondary)? Other   TCM attempt successful? No   Unsuccessful attempts Attempt 3          Marisela Pablo RN    2/8/2023, 13:45 EST

## 2023-02-09 DIAGNOSIS — E10.65 UNCONTROLLED TYPE 1 DIABETES MELLITUS WITH HYPERGLYCEMIA: ICD-10-CM

## 2023-02-09 LAB
BACTERIA SPEC AEROBE CULT: NORMAL
BACTERIA SPEC AEROBE CULT: NORMAL

## 2023-02-09 RX ORDER — PROCHLORPERAZINE 25 MG/1
SUPPOSITORY RECTAL
Qty: 9 EACH | Refills: 3 | Status: SHIPPED | OUTPATIENT
Start: 2023-02-09

## 2023-02-09 RX ORDER — PROCHLORPERAZINE 25 MG/1
SUPPOSITORY RECTAL
Qty: 9 EACH | Refills: 0
Start: 2023-02-09

## 2023-02-10 ENCOUNTER — TELEPHONE (OUTPATIENT)
Dept: ENDOCRINOLOGY | Facility: CLINIC | Age: 63
End: 2023-02-10
Payer: COMMERCIAL

## 2023-02-10 ENCOUNTER — DOCUMENTATION (OUTPATIENT)
Dept: ENDOCRINOLOGY | Facility: CLINIC | Age: 63
End: 2023-02-10
Payer: COMMERCIAL

## 2023-02-10 ENCOUNTER — PRIOR AUTHORIZATION (OUTPATIENT)
Dept: ENDOCRINOLOGY | Facility: CLINIC | Age: 63
End: 2023-02-10
Payer: COMMERCIAL

## 2023-02-10 RX ORDER — PNV NO.95/FERROUS FUM/FOLIC AC 28MG-0.8MG
TABLET ORAL
Qty: 30 TABLET | Refills: 0 | Status: SHIPPED | OUTPATIENT
Start: 2023-02-10

## 2023-02-10 RX ORDER — MULTIVITAMIN
TABLET ORAL
Qty: 30 TABLET | Refills: 0 | Status: SHIPPED | OUTPATIENT
Start: 2023-02-10

## 2023-02-10 NOTE — TELEPHONE ENCOUNTER
Thelma Michael Miguel: [id number] - [place] Case ID: [id number] - Rx #: [id number] help? Call us at [phone]  Outcome  Approvedtoday  The request has been approved. The authorization is effective from 02/10/2023 to 02/09/2024, as long as the member is enrolled in their current health plan. The request was approved as submitted. A written notification letter will follow with additional details.  Drug  Dexcom G6 Sensor  Form  [employer] [place] Medicaid ePA Form 2017 NCPDP    Thelma Michael Key: [id number] - [place] Case ID: [id number] - Rx #: [id number] help? Call us at [phone]  Outcome  Approvedtoday  The request has been approved. The authorization is effective from 02/10/2023 to 02/09/2024, as long as the member is enrolled in their current health plan. The request was approved as submitted. A written notification letter will follow with additional details.  Drug  Dexcom G6 Transmitter  Form  [employer] [place] Medicaid ePA Form 2017 NCPDP

## 2023-02-10 NOTE — TELEPHONE ENCOUNTER
PT CALLED STATING SHE IS OUT OF TRANSMITTERS AND SENSORS FOR HER DEXCOM. SHE REQUESTED AN RX TO BE SENT IN TO [facility] IN [facility]. SHE STATED THEY HAVE YET TO RECEIVE AN RX FROM US. PT REQUESTED WE LOOK INTO THIS AND REACH OUT TO HER.

## 2023-02-14 RX ORDER — INSULIN LISPRO 100 [IU]/ML
INJECTION, SOLUTION INTRAVENOUS; SUBCUTANEOUS
Qty: 45 ML | Refills: 0 | OUTPATIENT
Start: 2023-02-14

## 2023-02-14 NOTE — TELEPHONE ENCOUNTER
Humalog was d/c earlier this month by the hospital upon discharge. Patient was on insulin at last appointment w/ insulin pump. Do not see where we have sent in Humalog since last visit.

## 2023-03-15 ENCOUNTER — OFFICE VISIT (OUTPATIENT)
Dept: ENDOCRINOLOGY | Facility: CLINIC | Age: 63
End: 2023-03-15
Payer: COMMERCIAL

## 2023-03-15 VITALS
DIASTOLIC BLOOD PRESSURE: 80 MMHG | WEIGHT: 139 LBS | HEART RATE: 78 BPM | SYSTOLIC BLOOD PRESSURE: 140 MMHG | OXYGEN SATURATION: 99 % | HEIGHT: 68 IN | BODY MASS INDEX: 21.07 KG/M2

## 2023-03-15 DIAGNOSIS — E10.65 UNCONTROLLED TYPE 1 DIABETES MELLITUS WITH HYPERGLYCEMIA: Primary | ICD-10-CM

## 2023-03-15 LAB
EXPIRATION DATE: ABNORMAL
EXPIRATION DATE: NORMAL
GLUCOSE BLDC GLUCOMTR-MCNC: 242 MG/DL (ref 70–130)
HBA1C MFR BLD: 8.4 %
Lab: ABNORMAL
Lab: NORMAL

## 2023-03-15 PROCEDURE — 99214 OFFICE O/P EST MOD 30 MIN: CPT | Performed by: INTERNAL MEDICINE

## 2023-03-15 PROCEDURE — 3052F HG A1C>EQUAL 8.0%<EQUAL 9.0%: CPT | Performed by: INTERNAL MEDICINE

## 2023-03-15 PROCEDURE — 3077F SYST BP >= 140 MM HG: CPT | Performed by: INTERNAL MEDICINE

## 2023-03-15 PROCEDURE — 83036 HEMOGLOBIN GLYCOSYLATED A1C: CPT | Performed by: INTERNAL MEDICINE

## 2023-03-15 PROCEDURE — 82947 ASSAY GLUCOSE BLOOD QUANT: CPT | Performed by: INTERNAL MEDICINE

## 2023-03-15 PROCEDURE — 3079F DIAST BP 80-89 MM HG: CPT | Performed by: INTERNAL MEDICINE

## 2023-03-15 NOTE — PROGRESS NOTES
"     Office Note      Date: 03/15/2023  Patient Name: Thelma Michael  MRN: [id number]  : 1960    Chief Complaint   Patient presents with   • Diabetes       History of Present Illness:   Thelma Michael is a 62 y.o. female who presents for Diabetes type 1.   Current RX insulin in a tandem pump with control IQ  Bg is checked 288 times per day with dexcom.  Insulin doses are adjusted frequently based upon the readings.  Patient has occasional hypoglycemia.  Patient has been using the system during the last 3 months.    The last 2 weeks of dexcom data were reviewed   fasting bg are reasonable. No serious hypos. It goes high after lunch and then stays high  But stable the rest of the day              Last A1c:  Hemoglobin A1C   Date Value Ref Range Status   03/15/2023 8.4 % Final   2022 6.50 (H) 4.80 - 5.60 % Final       Changes in health since last visit: had gastric pacer placed and has not been back in the hhospital since then . Last eye exam due and advised .    Subjective              Review of Systems:   Review of Systems   Constitutional: Negative.    Gastrointestinal: Negative for vomiting.       The following portions of the patient's history were reviewed and updated as appropriate: allergies, current medications, past family history, past medical history, past social history, past surgical history and problem list.    Objective     Visit Vitals  /80   Pulse 78   Ht 172.7 cm (68\")   Wt 63 kg (139 lb)   SpO2 99%   BMI 21.13 kg/m²           Physical Exam:  Physical Exam  Vitals reviewed.   Constitutional:       Appearance: Normal appearance.   Neurological:      Mental Status: She is alert.   Psychiatric:         Mood and Affect: Mood normal.         Behavior: Behavior normal.         Thought Content: Thought content normal.         Judgment: Judgment normal.          Assessment / Plan      Assessment & Plan:  Problem List Items Addressed This Visit        Other    Uncontrolled " type 1 diabetes mellitus with hyperglycemia (HCC) - Primary    Overview     dx'd age 38 .          Current Assessment & Plan      Deteriorated.. a1c up to 8.7 because she is now actually absorbing her food.  Based uppon her data on the dexcom we need to change her carb ratio          Relevant Medications    Continuous Blood Gluc Transmit (Dexcom G6 Transmitter) misc    Continuous Blood Gluc Sensor (Dexcom G6 Sensor)    Other Relevant Orders    POC Glucose, Blood (Completed)    POC Glycosylated Hemoglobin (Hb A1C) (Completed)        Gerson Ochoa MD   03/15/2023

## 2023-03-15 NOTE — ASSESSMENT & PLAN NOTE
Deteriorated.. a1c up to 8.7 because she is now actually absorbing her food.  Based uppon her data on the dexcom we need to change her carb ratio

## 2023-03-16 ENCOUNTER — OFFICE VISIT (OUTPATIENT)
Dept: INTERNAL MEDICINE | Facility: CLINIC | Age: 63
End: 2023-03-16
Payer: COMMERCIAL

## 2023-03-16 ENCOUNTER — LAB (OUTPATIENT)
Dept: LAB | Facility: HOSPITAL | Age: 63
End: 2023-03-16
Payer: COMMERCIAL

## 2023-03-16 VITALS
SYSTOLIC BLOOD PRESSURE: 164 MMHG | TEMPERATURE: 97.4 F | WEIGHT: 141 LBS | DIASTOLIC BLOOD PRESSURE: 90 MMHG | HEART RATE: 77 BPM | HEIGHT: 68 IN | OXYGEN SATURATION: 98 % | BODY MASS INDEX: 21.37 KG/M2 | RESPIRATION RATE: 16 BRPM

## 2023-03-16 DIAGNOSIS — I10 ESSENTIAL HYPERTENSION: Primary | ICD-10-CM

## 2023-03-16 DIAGNOSIS — I48.92 ATRIAL FIBRILLATION AND FLUTTER: ICD-10-CM

## 2023-03-16 DIAGNOSIS — D50.8 IRON DEFICIENCY ANEMIA SECONDARY TO INADEQUATE DIETARY IRON INTAKE: ICD-10-CM

## 2023-03-16 DIAGNOSIS — Z13.29 THYROID DISORDER SCREENING: ICD-10-CM

## 2023-03-16 DIAGNOSIS — Z79.4 TYPE 2 DIABETES MELLITUS WITH HYPERGLYCEMIA, WITH LONG-TERM CURRENT USE OF INSULIN: ICD-10-CM

## 2023-03-16 DIAGNOSIS — Z13.21 ENCOUNTER FOR VITAMIN DEFICIENCY SCREENING: ICD-10-CM

## 2023-03-16 DIAGNOSIS — M19.90 OSTEOARTHRITIS, UNSPECIFIED OSTEOARTHRITIS TYPE, UNSPECIFIED SITE: ICD-10-CM

## 2023-03-16 DIAGNOSIS — K21.9 GASTROESOPHAGEAL REFLUX DISEASE, UNSPECIFIED WHETHER ESOPHAGITIS PRESENT: ICD-10-CM

## 2023-03-16 DIAGNOSIS — E11.65 TYPE 2 DIABETES MELLITUS WITH HYPERGLYCEMIA, WITH LONG-TERM CURRENT USE OF INSULIN: ICD-10-CM

## 2023-03-16 DIAGNOSIS — Z79.899 MEDICATION MANAGEMENT: ICD-10-CM

## 2023-03-16 DIAGNOSIS — Z13.220 LIPID SCREENING: ICD-10-CM

## 2023-03-16 DIAGNOSIS — I48.91 ATRIAL FIBRILLATION AND FLUTTER: ICD-10-CM

## 2023-03-16 LAB
ALBUMIN UR-MCNC: 47.1 MG/DL
BASOPHILS # BLD AUTO: 0.02 10*3/MM3 (ref 0–0.2)
BASOPHILS NFR BLD AUTO: 0.4 % (ref 0–1.5)
CREAT UR-MCNC: 95.4 MG/DL
DEPRECATED RDW RBC AUTO: 43 FL (ref 37–54)
EOSINOPHIL # BLD AUTO: 0.14 10*3/MM3 (ref 0–0.4)
EOSINOPHIL NFR BLD AUTO: 2.5 % (ref 0.3–6.2)
ERYTHROCYTE [DISTWIDTH] IN BLOOD BY AUTOMATED COUNT: 13.3 % (ref 12.3–15.4)
HCT VFR BLD AUTO: 32.4 % (ref 34–46.6)
HGB BLD-MCNC: 11 G/DL (ref 12–15.9)
IMM GRANULOCYTES # BLD AUTO: 0.02 10*3/MM3 (ref 0–0.05)
IMM GRANULOCYTES NFR BLD AUTO: 0.4 % (ref 0–0.5)
LYMPHOCYTES # BLD AUTO: 1.94 10*3/MM3 (ref 0.7–3.1)
LYMPHOCYTES NFR BLD AUTO: 34.5 % (ref 19.6–45.3)
MCH RBC QN AUTO: 29.9 PG (ref 26.6–33)
MCHC RBC AUTO-ENTMCNC: 34 G/DL (ref 31.5–35.7)
MCV RBC AUTO: 88 FL (ref 79–97)
MICROALBUMIN/CREAT UR: 493.7 MG/G
MONOCYTES # BLD AUTO: 0.38 10*3/MM3 (ref 0.1–0.9)
MONOCYTES NFR BLD AUTO: 6.8 % (ref 5–12)
NEUTROPHILS NFR BLD AUTO: 3.12 10*3/MM3 (ref 1.7–7)
NEUTROPHILS NFR BLD AUTO: 55.4 % (ref 42.7–76)
NRBC BLD AUTO-RTO: 0 /100 WBC (ref 0–0.2)
PLATELET # BLD AUTO: 294 10*3/MM3 (ref 140–450)
PMV BLD AUTO: 10.8 FL (ref 6–12)
RBC # BLD AUTO: 3.68 10*6/MM3 (ref 3.77–5.28)
WBC NRBC COR # BLD: 5.62 10*3/MM3 (ref 3.4–10.8)

## 2023-03-16 PROCEDURE — 82607 VITAMIN B-12: CPT | Performed by: NURSE PRACTITIONER

## 2023-03-16 PROCEDURE — 84466 ASSAY OF TRANSFERRIN: CPT | Performed by: NURSE PRACTITIONER

## 2023-03-16 PROCEDURE — 82043 UR ALBUMIN QUANTITATIVE: CPT | Performed by: NURSE PRACTITIONER

## 2023-03-16 PROCEDURE — 83540 ASSAY OF IRON: CPT | Performed by: NURSE PRACTITIONER

## 2023-03-16 PROCEDURE — 82570 ASSAY OF URINE CREATININE: CPT | Performed by: NURSE PRACTITIONER

## 2023-03-16 PROCEDURE — 80061 LIPID PANEL: CPT | Performed by: NURSE PRACTITIONER

## 2023-03-16 PROCEDURE — 82728 ASSAY OF FERRITIN: CPT | Performed by: NURSE PRACTITIONER

## 2023-03-16 PROCEDURE — 80050 GENERAL HEALTH PANEL: CPT | Performed by: NURSE PRACTITIONER

## 2023-03-16 PROCEDURE — 82746 ASSAY OF FOLIC ACID SERUM: CPT | Performed by: NURSE PRACTITIONER

## 2023-03-16 PROCEDURE — 82306 VITAMIN D 25 HYDROXY: CPT | Performed by: NURSE PRACTITIONER

## 2023-03-16 RX ORDER — MIRTAZAPINE 7.5 MG/1
7.5 TABLET, FILM COATED ORAL NIGHTLY
Qty: 30 TABLET | Refills: 1 | Status: SHIPPED | OUTPATIENT
Start: 2023-03-16

## 2023-03-16 RX ORDER — TRAMADOL HYDROCHLORIDE 50 MG/1
50 TABLET ORAL EVERY 6 HOURS PRN
Qty: 28 TABLET | Refills: 2 | Status: SHIPPED | OUTPATIENT
Start: 2023-03-16

## 2023-03-16 RX ORDER — CARVEDILOL 6.25 MG/1
6.25 TABLET ORAL 2 TIMES DAILY WITH MEALS
Qty: 60 TABLET | Refills: 2 | Status: SHIPPED | OUTPATIENT
Start: 2023-03-16 | End: 2023-04-15

## 2023-03-16 NOTE — PROGRESS NOTES
Subjective   Chief Complaint   Patient presents with   • Diabetes      Thelma Michael is a 62 y.o. female.     The patient is here today for a follow-up on diabetes and hypertension.    The patient's A1c was completed yesterday, 03/15/2023, with A1c of 8.4 percent. She saw Dr. Gerson Ochoa yesterday, 03/15/2023. The patient states she is doing great.    The patient had a stimulator placed in her abdomen a couple of weeks ago, which she has notice a significant difference. She still experiences some symptoms. She has a follow-up appointment on 03/28/2023, and to increase the stimulator. She is able to eat okay. She denies nausea or vomiting. She weighed 141.2 pounds. Dr. Ochoa' goal weight for her is 150 pounds. SHe denies having bloody stools in the past.    The patient's blood pressure is significantly elevated today. She is taking doxazosin at night. She is not taking carvedilol. She has a blood pressure machine that checks her blood pressure and pulse.    The patient was put on Xarelto for atrial fibrillation. She had a doctor's appointment with the cardiologist on Monday, 03/13/2022, but had to cancel it as she was not feeling well. She plans to reschedule. She denies palpitations. She is unsure if she was diagnosed with permanent atrial fibrillation.    The patient has iron deficient anemia. She is currently taking ferrous sulfate daily with orange juice.    The patient is taking a vitamin D supplement.    The patient is still taking Protonix for GERD symptoms.    The patient complains of pain in her shoulder and arm. She was told that she had frozen shoulder.     The patient has arthritis in her hip. She was taking gabapentin for pain with no relief. She was taking tramadol, which helped.    The patient is taking trazodone for insomnia, which is not helping. She states when she took 1, she stayed awake. She was given mirtazapine in the hospital and states that it helped with her sleep.    The patient  experiences dyspnea. She requests a new inhaler. Her dyspnea has improved since having her surgery. She has a history of smoking. She no longer smokes.    The patient inquires about a form to receive handicap parking.  I have reviewed the following portions of the patient's history and confirmed they are accurate: allergies, current medications, past family history, past medical history, past social history, past surgical history, and problem list    I have personally completed the patient's review of systems.    Review of Systems   Constitutional: Positive for fatigue. Negative for activity change, appetite change, chills, diaphoresis, fever, unexpected weight gain and unexpected weight loss.   HENT: Negative for ear discharge, ear pain, mouth sores, nosebleeds, sinus pressure, sneezing and sore throat.    Eyes: Negative for pain, discharge and itching.   Respiratory: Negative for cough, chest tightness, shortness of breath and wheezing.    Cardiovascular: Negative for chest pain and palpitations.   Gastrointestinal: Negative for abdominal pain, diarrhea, nausea, vomiting, GERD and indigestion.   Endocrine: Negative for heat intolerance, polydipsia and polyphagia.   Genitourinary: Negative for dysuria, flank pain, frequency, hematuria, pelvic pain, pelvic pressure and urgency.   Musculoskeletal: Positive for arthralgias, back pain and myalgias. Negative for gait problem, joint swelling, neck pain and neck stiffness.   Skin: Negative for color change, pallor and rash.   Allergic/Immunologic: Negative for immunocompromised state.   Neurological: Positive for numbness and memory problem. Negative for dizziness, seizures, speech difficulty, light-headedness, headache and confusion.   Hematological: Negative for adenopathy. Bruises/bleeds easily.   Psychiatric/Behavioral: Positive for stress. Negative for agitation, decreased concentration, dysphoric mood, sleep disturbance and depressed mood. The patient is  nervous/anxious.        No current facility-administered medications on file prior to visit.     Current Outpatient Medications on File Prior to Visit   Medication Sig   • acetaminophen (TYLENOL) 325 MG tablet Take 2 tablets by mouth Every 4 (Four) Hours As Needed for Mild Pain .   • albuterol sulfate  (90 Base) MCG/ACT inhaler Inhale 2 puffs Every 4 (Four) Hours As Needed for Wheezing.   • Ascorbic Acid (VITAMIN C PO) Vitamin C TABS   • atorvastatin (LIPITOR) 80 MG tablet Take 1 tablet by mouth Every Night.   • calcium carbonate (TUMS) 500 MG chewable tablet Chew 1,000 mg 3 (Three) Times a Day As Needed for Indigestion or Heartburn.   • Continuous Blood Gluc Sensor (Dexcom G6 Sensor) USE ONE SENSOR EVERY 10 DAYS   • Continuous Blood Gluc Sensor (Dexcom G6 Sensor) Every 10 (Ten) Days.   • Continuous Blood Gluc Transmit (Dexcom G6 Transmitter) misc 1 each Every 3 (Three) Months.   • diphenhydrAMINE (BENADRYL) 25 MG tablet Take 1 tablet by mouth Every 6 (Six) Hours As Needed for Itching.   • donepezil (Aricept) 5 MG tablet Take 1 tablet by mouth Every Night.   • doxazosin (CARDURA) 2 MG tablet Take 1 tablet by mouth Every Night.   • ferrous sulfate 325 (65 Fe) MG tablet TAKE 1 TABLET BY MOUTH ONCE DAILY WITH  BREAKFAST  **TAKE  WITH  ORANGE  JUICE  OR  VITAMIN  C**   • FLUoxetine (PROzac) 20 MG capsule Take 1 capsule by mouth Daily.   • glucose monitor monitoring kit 1 each 4 (Four) Times a Day.   • melatonin 5 MG tablet tablet Take 1 tablet by mouth At Night As Needed (sleep).   • Multivitamin tablet tablet Take 1 tablet by mouth once daily   • pantoprazole (PROTONIX) 40 MG EC tablet Take 1 tablet by mouth Daily.   • sennosides-docusate (senna-docusate sodium) 8.6-50 MG per tablet Take 2 tablets by mouth 2 (Two) Times a Day As Needed for Constipation.   • [DISCONTINUED] Insulin Glargine (BASAGLAR KWIKPEN) 100 UNIT/ML injection pen Inject 10 Units under the skin into the appropriate area as directed Every  "Night.       Objective   Vitals:    03/16/23 1412   BP: 164/90   Pulse: 77   Resp: 16   Temp: 97.4 °F (36.3 °C)   TempSrc: Tympanic   SpO2: 98%   Weight: 64 kg (141 lb)   Height: 172.7 cm (68\")     Body mass index is 21.44 kg/m².    Physical Exam  Vitals reviewed.   Constitutional:       Appearance: Normal appearance. She is well-developed.   HENT:      Head: Normocephalic and atraumatic.      Nose: Nose normal.   Eyes:      General: Lids are normal.      Conjunctiva/sclera: Conjunctivae normal.      Pupils: Pupils are equal, round, and reactive to light.   Neck:      Thyroid: No thyromegaly.      Trachea: Trachea normal.   Cardiovascular:      Rate and Rhythm: Normal rate and regular rhythm.      Heart sounds: Normal heart sounds.   Pulmonary:      Effort: Pulmonary effort is normal. No respiratory distress.      Breath sounds: Normal breath sounds.   Musculoskeletal:         General: Tenderness present.      Lumbar back: Spasms and tenderness present.   Skin:     General: Skin is warm and dry.   Neurological:      Mental Status: She is alert and oriented to person, place, and time.      GCS: GCS eye subscore is 4. GCS verbal subscore is 5. GCS motor subscore is 6.   Psychiatric:         Attention and Perception: Attention normal.         Mood and Affect: Mood and affect normal.         Speech: Speech normal.         Behavior: Behavior normal. Behavior is cooperative.         Thought Content: Thought content normal.         Assessment & Plan   Problem List Items Addressed This Visit        Cardiac and Vasculature    Atrial fibrillation and flutter  Chronic, unstable.   - Start carvedilol.   - Start Xarelto.   - Encouraged patient to follow up with cardiologist.    Overview     · Echocardiogram, 11/1/2019: Left atrium 2.8 cm. Left ventricular systolic function is hyperdynamic (EF > 70). Left ventricular diastolic dysfunction (grade I) consistent with impaired relaxation. Left ventricular wall thickness is consistent " with mild concentric hypertrophy.         Relevant Medications    rivaroxaban (XARELTO) 20 MG tablet    carvedilol (COREG) 6.25 MG tablet       Gastrointestinal Abdominal     GERD  Chronic, unstable, but improving.   - Patient will continue follow-ups with gastroenterology due to new stimulator to help with gastroparesis.       Hematology and Neoplasia    Iron deficiency anemia secondary to inadequate dietary iron intake    Overview     Chronic, unstable.   - Completing anemia panel today.   - Continue iron supplement with vitamin C.       Relevant Orders    CBC Auto Differential (Completed)    Ferritin (Completed)    Iron Profile (Completed)   Other Visit Diagnoses     Essential hypertension    -  Primary  Chronic, unstable.   - Start carvedilol.   - Continue doxazosin.   - Patient will follow up in 2 weeks for recheck.   - Discussed with patient that if her heart rate drops below 60 to hold the carvedilol and contact the office again.   - Encouraged her to follow up with cardiology.    Relevant Medications    carvedilol (COREG) 6.25 MG tablet    Type 2 diabetes mellitus with hyperglycemia, with long-term current use of insulin        Chronic, unstable.   - Continue insulin pump.   - Continue follow-ups with endocrinologist.    Relevant Orders    Microalbumin / Creatinine Urine Ratio - Urine, Clean Catch (Completed)    Comprehensive Metabolic Panel (Completed)    Osteoarthritis, unspecified osteoarthritis type, unspecified site        Chronic, unstable.   - Continue tramadol as needed.   - Consider getting some updated x-rays.    Relevant Medications    traMADol (ULTRAM) 50 MG tablet    Encounter for vitamin deficiency screening        Relevant Orders    Vitamin B12 & Folate (Completed)    Vitamin D,25-Hydroxy (Completed)    Lipid screening        Relevant Orders    Lipid Panel (Completed)    Thyroid disorder screening        Relevant Orders    TSH Rfx On Abnormal To Free T4 (Completed)    Medication management         Relevant Orders    Compliance Drug Analysis, Ur - Urine, Clean Catch (Completed)           No current facility-administered medications for this visit.  No current outpatient medications on file.    Facility-Administered Medications Ordered in Other Visits:   •  acetaminophen (TYLENOL) tablet 650 mg, 650 mg, Oral, Q6H PRN, Yara Juarez DNP, APRN, 650 mg at 03/31/23 1541  •  atorvastatin (LIPITOR) tablet 80 mg, 80 mg, Oral, Nightly, Marlene Greenberg MD, 80 mg at 04/01/23 2032  •  carvedilol (COREG) tablet 6.25 mg, 6.25 mg, Oral, BID With Meals, Marlene Greenberg MD, 6.25 mg at 04/01/23 1716  •  dextrose (D50W) (25 g/50 mL) IV injection 25 g, 25 g, Intravenous, Q15 Min PRN, Marlene Greenberg MD  •  dextrose (GLUTOSE) oral gel 15 g, 15 g, Oral, Q15 Min PRN, Marlene Greenberg MD  •  donepezil (ARICEPT) tablet 5 mg, 5 mg, Oral, Nightly, Marlene Greenberg MD, 5 mg at 04/01/23 2032  •  FLUoxetine (PROzac) capsule 20 mg, 20 mg, Oral, Daily, Marlene Greenberg MD, 20 mg at 04/01/23 0842  •  hydrALAZINE (APRESOLINE) injection 10 mg, 10 mg, Intravenous, Q6H PRN, Milton Bonilla APRKIA, 10 mg at 04/01/23 1504  •  insulin detemir (LEVEMIR) injection 15 Units, 15 Units, Subcutaneous, Daily, Marlene Greenberg MD, 15 Units at 04/01/23 0842  •  Insulin Lispro (humaLOG) injection 0-7 Units, 0-7 Units, Subcutaneous, TID AC, Marlene Greenberg MD, 3 Units at 04/01/23 0842  •  Insulin Lispro (humaLOG) injection 5 Units, 5 Units, Subcutaneous, TID With Meals, Marlene Greenberg MD, 5 Units at 04/01/23 0842  •  Magnesium Standard Dose Replacement - Follow Nurse / BPA Driven Protocol, , Does not apply, PRN, Thom Chaves MD  •  melatonin tablet 5 mg, 5 mg, Oral, Nightly PRN, Marlene Greenberg MD, 5 mg at 03/31/23 2024  •  niCARdipine (CARDENE) 25mg in 250mL NS infusion, 5-15 mg/hr, Intravenous, Titrated, Thom Chaves MD, Held at 03/31/23 0645  •  pantoprazole (PROTONIX) EC  tablet 40 mg, 40 mg, Oral, Daily, Marlene Greenberg MD, 40 mg at 04/01/23 0841  •  Phosphorus Replacement - Follow Nurse / BPA Driven Protocol, , Does not apply, PRN, Thom Chaves MD  •  Potassium Replacement - Follow Nurse / BPA Driven Protocol, , Does not apply, PRN, Thom Chaves MD  •  promethazine (PHENERGAN) 12.5 mg in sodium chloride 0.9 % 50 mL, 12.5 mg, Intravenous, Q6H PRN, Thom Chaves MD, Last Rate: 0 mL/hr at 03/30/23 1540, 12.5 mg at 04/01/23 1037  •  rivaroxaban (XARELTO) tablet 20 mg, 20 mg, Oral, Daily With Dinner, Marlene Greenberg MD, 20 mg at 04/01/23 1716  •  sodium chloride 0.9 % flush 10 mL, 10 mL, Intravenous, PRN, Thom Chaves MD, 10 mL at 04/01/23 0842  •  terazosin (HYTRIN) capsule 2 mg, 2 mg, Oral, Nightly, Marlene Greenberg MD, 2 mg at 04/01/23 2032       Plan of care reviewed with the patient at the conclusion of today's visit.  Education was provided regarding diagnosis, management, and any prescribed or recommended OTC medications.  Patient verbalized understanding of and agreement with management plan.     Return in about 3 months (around 6/16/2023), or if symptoms worsen or fail to improve, for Follow-up.      Transcribed from ambient dictation for HOWIE Mckenna by Yara Jones.  03/16/23   14:50 EDT    Patient or patient representative verbalized consent to the visit recording.  I have personally performed the services described in this document as transcribed by the above individual, and it is both accurate and complete.

## 2023-03-16 NOTE — ASSESSMENT & PLAN NOTE
Chronic, unstable.   - Start carvedilol.   - Continue doxazosin.   - Patient will follow up in 2 weeks for recheck.   - Discussed with patient that if her heart rate drops below 60 to hold the carvedilol and contact the office again.   - Encouraged her to follow up with cardiology.

## 2023-03-17 LAB
25(OH)D3 SERPL-MCNC: 22 NG/ML (ref 30–100)
ALBUMIN SERPL-MCNC: 4.1 G/DL (ref 3.5–5.2)
ALBUMIN/GLOB SERPL: 1.5 G/DL
ALP SERPL-CCNC: 99 U/L (ref 39–117)
ALT SERPL W P-5'-P-CCNC: 13 U/L (ref 1–33)
ANION GAP SERPL CALCULATED.3IONS-SCNC: 9.2 MMOL/L (ref 5–15)
AST SERPL-CCNC: 13 U/L (ref 1–32)
BILIRUB SERPL-MCNC: <0.2 MG/DL (ref 0–1.2)
BUN SERPL-MCNC: 18 MG/DL (ref 8–23)
BUN/CREAT SERPL: 15.5 (ref 7–25)
CALCIUM SPEC-SCNC: 9.6 MG/DL (ref 8.6–10.5)
CHLORIDE SERPL-SCNC: 105 MMOL/L (ref 98–107)
CHOLEST SERPL-MCNC: 256 MG/DL (ref 0–200)
CO2 SERPL-SCNC: 28.8 MMOL/L (ref 22–29)
CREAT SERPL-MCNC: 1.16 MG/DL (ref 0.57–1)
EGFRCR SERPLBLD CKD-EPI 2021: 52.8 ML/MIN/1.73
FERRITIN SERPL-MCNC: 41.8 NG/ML (ref 13–150)
FOLATE SERPL-MCNC: 12.7 NG/ML (ref 4.78–24.2)
GLOBULIN UR ELPH-MCNC: 2.8 GM/DL
GLUCOSE SERPL-MCNC: 116 MG/DL (ref 65–99)
HDLC SERPL-MCNC: 76 MG/DL (ref 40–60)
IRON 24H UR-MRATE: 46 MCG/DL (ref 37–145)
IRON SATN MFR SERPL: 12 % (ref 20–50)
LDLC SERPL CALC-MCNC: 160 MG/DL (ref 0–100)
LDLC/HDLC SERPL: 2.07 {RATIO}
POTASSIUM SERPL-SCNC: 3.9 MMOL/L (ref 3.5–5.2)
PROT SERPL-MCNC: 6.9 G/DL (ref 6–8.5)
SODIUM SERPL-SCNC: 143 MMOL/L (ref 136–145)
TIBC SERPL-MCNC: 393 MCG/DL (ref 298–536)
TRANSFERRIN SERPL-MCNC: 264 MG/DL (ref 200–360)
TRIGL SERPL-MCNC: 114 MG/DL (ref 0–150)
TSH SERPL DL<=0.05 MIU/L-ACNC: 0.52 UIU/ML (ref 0.27–4.2)
VIT B12 BLD-MCNC: 338 PG/ML (ref 211–946)
VLDLC SERPL-MCNC: 20 MG/DL (ref 5–40)

## 2023-03-20 DIAGNOSIS — Z78.0 ENCOUNTER FOR OSTEOPOROSIS SCREENING IN ASYMPTOMATIC POSTMENOPAUSAL PATIENT: Primary | ICD-10-CM

## 2023-03-20 DIAGNOSIS — Z13.820 ENCOUNTER FOR OSTEOPOROSIS SCREENING IN ASYMPTOMATIC POSTMENOPAUSAL PATIENT: Primary | ICD-10-CM

## 2023-03-20 RX ORDER — ERGOCALCIFEROL 1.25 MG/1
50000 CAPSULE ORAL WEEKLY
Qty: 4 CAPSULE | Refills: 2 | Status: SHIPPED | OUTPATIENT
Start: 2023-03-20

## 2023-03-23 LAB — DRUGS UR: NORMAL

## 2023-03-30 ENCOUNTER — APPOINTMENT (OUTPATIENT)
Dept: GENERAL RADIOLOGY | Facility: HOSPITAL | Age: 63
DRG: 638 | End: 2023-03-30
Payer: COMMERCIAL

## 2023-03-30 ENCOUNTER — HOSPITAL ENCOUNTER (INPATIENT)
Facility: HOSPITAL | Age: 63
LOS: 5 days | Discharge: HOME | DRG: 638 | End: 2023-04-04
Attending: EMERGENCY MEDICINE | Admitting: INTERNAL MEDICINE
Payer: COMMERCIAL

## 2023-03-30 ENCOUNTER — APPOINTMENT (OUTPATIENT)
Dept: CT IMAGING | Facility: HOSPITAL | Age: 63
DRG: 638 | End: 2023-03-30
Payer: COMMERCIAL

## 2023-03-30 DIAGNOSIS — K92.0 HEMATEMESIS WITH NAUSEA: ICD-10-CM

## 2023-03-30 DIAGNOSIS — E13.10 DIABETIC KETOACIDOSIS WITHOUT COMA ASSOCIATED WITH OTHER SPECIFIED DIABETES MELLITUS: ICD-10-CM

## 2023-03-30 DIAGNOSIS — N17.9 ACUTE KIDNEY INJURY: ICD-10-CM

## 2023-03-30 DIAGNOSIS — I48.92 ATRIAL FIBRILLATION AND FLUTTER: ICD-10-CM

## 2023-03-30 DIAGNOSIS — I48.91 ATRIAL FIBRILLATION AND FLUTTER: ICD-10-CM

## 2023-03-30 DIAGNOSIS — R11.10 INTRACTABLE VOMITING: Primary | ICD-10-CM

## 2023-03-30 DIAGNOSIS — E86.0 DEHYDRATION: ICD-10-CM

## 2023-03-30 DIAGNOSIS — R73.9 HYPERGLYCEMIA: ICD-10-CM

## 2023-03-30 PROBLEM — E87.6 HYPOKALEMIA: Status: ACTIVE | Noted: 2023-03-30

## 2023-03-30 LAB
ABO GROUP BLD: NORMAL
ALBUMIN SERPL-MCNC: 4.3 G/DL (ref 3.5–5.2)
ALBUMIN/GLOB SERPL: 1.3 G/DL
ALP SERPL-CCNC: 109 U/L (ref 39–117)
ALT SERPL W P-5'-P-CCNC: 10 U/L (ref 1–33)
ANION GAP SERPL CALCULATED.3IONS-SCNC: 15 MMOL/L (ref 5–15)
ANION GAP SERPL CALCULATED.3IONS-SCNC: 19 MMOL/L (ref 5–15)
ANION GAP SERPL CALCULATED.3IONS-SCNC: 23 MMOL/L (ref 5–15)
ANION GAP SERPL CALCULATED.3IONS-SCNC: 28 MMOL/L (ref 5–15)
ARTERIAL PATENCY WRIST A: ABNORMAL
AST SERPL-CCNC: 16 U/L (ref 1–32)
ATMOSPHERIC PRESS: ABNORMAL MM[HG]
B-OH-BUTYR SERPL-SCNC: 3.46 MMOL/L (ref 0.02–0.27)
BACTERIA UR QL AUTO: NORMAL /HPF
BASE EXCESS BLDA CALC-SCNC: -5.3 MMOL/L (ref 0–2)
BASOPHILS # BLD AUTO: 0.02 10*3/MM3 (ref 0–0.2)
BASOPHILS NFR BLD AUTO: 0.2 % (ref 0–1.5)
BDY SITE: ABNORMAL
BILIRUB SERPL-MCNC: 0.7 MG/DL (ref 0–1.2)
BILIRUB UR QL STRIP: NEGATIVE
BLD GP AB SCN SERPL QL: NEGATIVE
BODY TEMPERATURE: 37 C
BUN SERPL-MCNC: 26 MG/DL (ref 8–23)
BUN SERPL-MCNC: 26 MG/DL (ref 8–23)
BUN SERPL-MCNC: 29 MG/DL (ref 8–23)
BUN SERPL-MCNC: 30 MG/DL (ref 8–23)
BUN/CREAT SERPL: 16 (ref 7–25)
BUN/CREAT SERPL: 19.1 (ref 7–25)
BUN/CREAT SERPL: 20.2 (ref 7–25)
BUN/CREAT SERPL: 20.5 (ref 7–25)
CALCIUM SPEC-SCNC: 8.7 MG/DL (ref 8.6–10.5)
CALCIUM SPEC-SCNC: 8.8 MG/DL (ref 8.6–10.5)
CALCIUM SPEC-SCNC: 9.5 MG/DL (ref 8.6–10.5)
CALCIUM SPEC-SCNC: 9.9 MG/DL (ref 8.6–10.5)
CHLORIDE SERPL-SCNC: 103 MMOL/L (ref 98–107)
CHLORIDE SERPL-SCNC: 108 MMOL/L (ref 98–107)
CHLORIDE SERPL-SCNC: 114 MMOL/L (ref 98–107)
CHLORIDE SERPL-SCNC: 97 MMOL/L (ref 98–107)
CLARITY UR: CLEAR
CO2 BLDA-SCNC: 17.9 MMOL/L (ref 22–33)
CO2 SERPL-SCNC: 17 MMOL/L (ref 22–29)
CO2 SERPL-SCNC: 21 MMOL/L (ref 22–29)
COHGB MFR BLD: 1 % (ref 0–2)
COLOR UR: YELLOW
CREAT SERPL-MCNC: 1.29 MG/DL (ref 0.57–1)
CREAT SERPL-MCNC: 1.36 MG/DL (ref 0.57–1)
CREAT SERPL-MCNC: 1.46 MG/DL (ref 0.57–1)
CREAT SERPL-MCNC: 1.81 MG/DL (ref 0.57–1)
D-LACTATE SERPL-SCNC: 2.7 MMOL/L (ref 0.5–2)
D-LACTATE SERPL-SCNC: 4 MMOL/L (ref 0.5–2)
D-LACTATE SERPL-SCNC: 4.3 MMOL/L (ref 0.5–2)
D-LACTATE SERPL-SCNC: 6 MMOL/L (ref 0.5–2)
DEPRECATED RDW RBC AUTO: 41 FL (ref 37–54)
DEVELOPER EXPIRATION DATE: NORMAL
DEVELOPER LOT NUMBER: NORMAL
EGFRCR SERPLBLD CKD-EPI 2021: 30.9 ML/MIN/1.73
EGFRCR SERPLBLD CKD-EPI 2021: 40 ML/MIN/1.73
EGFRCR SERPLBLD CKD-EPI 2021: 43.6 ML/MIN/1.73
EGFRCR SERPLBLD CKD-EPI 2021: 46.4 ML/MIN/1.73
EOSINOPHIL # BLD AUTO: 0 10*3/MM3 (ref 0–0.4)
EOSINOPHIL NFR BLD AUTO: 0 % (ref 0.3–6.2)
EPAP: 0
ERYTHROCYTE [DISTWIDTH] IN BLOOD BY AUTOMATED COUNT: 13 % (ref 12.3–15.4)
EXPIRATION DATE: NORMAL
FECAL OCCULT BLOOD SCREEN, POC: NEGATIVE
GASTROCULT GAST QL: POSITIVE
GEN 5 2HR TROPONIN T REFLEX: 23 NG/L
GLOBULIN UR ELPH-MCNC: 3.4 GM/DL
GLUCOSE BLDC GLUCOMTR-MCNC: 181 MG/DL (ref 70–130)
GLUCOSE BLDC GLUCOMTR-MCNC: 188 MG/DL (ref 70–130)
GLUCOSE BLDC GLUCOMTR-MCNC: 205 MG/DL (ref 70–130)
GLUCOSE BLDC GLUCOMTR-MCNC: 222 MG/DL (ref 70–130)
GLUCOSE BLDC GLUCOMTR-MCNC: 226 MG/DL (ref 70–130)
GLUCOSE BLDC GLUCOMTR-MCNC: 403 MG/DL (ref 70–130)
GLUCOSE BLDC GLUCOMTR-MCNC: 428 MG/DL (ref 70–130)
GLUCOSE SERPL-MCNC: 209 MG/DL (ref 65–99)
GLUCOSE SERPL-MCNC: 216 MG/DL (ref 65–99)
GLUCOSE SERPL-MCNC: 449 MG/DL (ref 65–99)
GLUCOSE SERPL-MCNC: 451 MG/DL (ref 65–99)
GLUCOSE UR STRIP-MCNC: ABNORMAL MG/DL
HBA1C MFR BLD: 8 % (ref 4.8–5.6)
HCO3 BLDA-SCNC: 17.1 MMOL/L (ref 20–26)
HCT VFR BLD AUTO: 32.8 % (ref 34–46.6)
HCT VFR BLD AUTO: 38.3 % (ref 34–46.6)
HCT VFR BLD CALC: 37.9 % (ref 38–51)
HGB BLD-MCNC: 11.3 G/DL (ref 12–15.9)
HGB BLD-MCNC: 13.3 G/DL (ref 12–15.9)
HGB BLDA-MCNC: 12.4 G/DL (ref 14–18)
HGB UR QL STRIP.AUTO: ABNORMAL
HOLD SPECIMEN: NORMAL
HYALINE CASTS UR QL AUTO: NORMAL /LPF
IMM GRANULOCYTES # BLD AUTO: 0.04 10*3/MM3 (ref 0–0.05)
IMM GRANULOCYTES NFR BLD AUTO: 0.5 % (ref 0–0.5)
INHALED O2 CONCENTRATION: 21 %
IPAP: 0
KETONES UR QL STRIP: ABNORMAL
LEUKOCYTE ESTERASE UR QL STRIP.AUTO: NEGATIVE
LIPASE SERPL-CCNC: 22 U/L (ref 13–60)
LYMPHOCYTES # BLD AUTO: 1.54 10*3/MM3 (ref 0.7–3.1)
LYMPHOCYTES NFR BLD AUTO: 17.5 % (ref 19.6–45.3)
Lab: NORMAL
MAGNESIUM SERPL-MCNC: 1.4 MG/DL (ref 1.6–2.4)
MAGNESIUM SERPL-MCNC: 1.5 MG/DL (ref 1.6–2.4)
MAGNESIUM SERPL-MCNC: 1.7 MG/DL (ref 1.6–2.4)
MAGNESIUM SERPL-MCNC: 2.1 MG/DL (ref 1.6–2.4)
MCH RBC QN AUTO: 30.2 PG (ref 26.6–33)
MCHC RBC AUTO-ENTMCNC: 34.7 G/DL (ref 31.5–35.7)
MCV RBC AUTO: 86.8 FL (ref 79–97)
METHGB BLD QL: 0.3 % (ref 0–1.5)
MODALITY: ABNORMAL
MONOCYTES # BLD AUTO: 0.39 10*3/MM3 (ref 0.1–0.9)
MONOCYTES NFR BLD AUTO: 4.4 % (ref 5–12)
NEGATIVE CONTROL: NEGATIVE
NEUTROPHILS NFR BLD AUTO: 6.8 10*3/MM3 (ref 1.7–7)
NEUTROPHILS NFR BLD AUTO: 77.4 % (ref 42.7–76)
NITRITE UR QL STRIP: NEGATIVE
NOTE: ABNORMAL
NRBC BLD AUTO-RTO: 0 /100 WBC (ref 0–0.2)
OSMOLALITY SERPL: 328 MOSM/KG (ref 275–295)
OXYHGB MFR BLDV: 98 % (ref 94–99)
PAW @ PEAK INSP FLOW SETTING VENT: 0 CMH2O
PCO2 BLDA: 24.6 MM HG (ref 35–45)
PCO2 TEMP ADJ BLD: 24.6 MM HG (ref 35–45)
PH BLDA: 7.45 PH UNITS (ref 7.35–7.45)
PH UR STRIP.AUTO: 6.5 [PH] (ref 5–8)
PH, TEMP CORRECTED: 7.45 PH UNITS
PHOSPHATE SERPL-MCNC: 1.2 MG/DL (ref 2.5–4.5)
PHOSPHATE SERPL-MCNC: 1.5 MG/DL (ref 2.5–4.5)
PHOSPHATE SERPL-MCNC: 2.4 MG/DL (ref 2.5–4.5)
PHOSPHATE SERPL-MCNC: 2.5 MG/DL (ref 2.5–4.5)
PLATELET # BLD AUTO: 405 10*3/MM3 (ref 140–450)
PMV BLD AUTO: 10.5 FL (ref 6–12)
PO2 BLDA: 112 MM HG (ref 83–108)
PO2 TEMP ADJ BLD: 112 MM HG (ref 83–108)
POSITIVE CONTROL: POSITIVE
POTASSIUM SERPL-SCNC: 2.9 MMOL/L (ref 3.5–5.2)
POTASSIUM SERPL-SCNC: 2.9 MMOL/L (ref 3.5–5.2)
POTASSIUM SERPL-SCNC: 3.1 MMOL/L (ref 3.5–5.2)
POTASSIUM SERPL-SCNC: 3.1 MMOL/L (ref 3.5–5.2)
PROT SERPL-MCNC: 7.7 G/DL (ref 6–8.5)
PROT UR QL STRIP: ABNORMAL
QT INTERVAL: 390 MS
QTC INTERVAL: 530 MS
RBC # BLD AUTO: 4.41 10*6/MM3 (ref 3.77–5.28)
RBC # UR STRIP: NORMAL /HPF
REF LAB TEST METHOD: NORMAL
RH BLD: POSITIVE
SODIUM SERPL-SCNC: 142 MMOL/L (ref 136–145)
SODIUM SERPL-SCNC: 143 MMOL/L (ref 136–145)
SODIUM SERPL-SCNC: 144 MMOL/L (ref 136–145)
SODIUM SERPL-SCNC: 150 MMOL/L (ref 136–145)
SP GR UR STRIP: 1.01 (ref 1–1.03)
SQUAMOUS #/AREA URNS HPF: NORMAL /HPF
T&S EXPIRATION DATE: NORMAL
TOTAL RATE: 0 BREATHS/MINUTE
TROPONIN T DELTA: -8 NG/L
TROPONIN T SERPL HS-MCNC: 31 NG/L
UROBILINOGEN UR QL STRIP: ABNORMAL
WBC # UR STRIP: NORMAL /HPF
WBC NRBC COR # BLD: 8.79 10*3/MM3 (ref 3.4–10.8)
WHOLE BLOOD HOLD COAG: NORMAL
WHOLE BLOOD HOLD SPECIMEN: NORMAL

## 2023-03-30 PROCEDURE — 82375 ASSAY CARBOXYHB QUANT: CPT

## 2023-03-30 PROCEDURE — G0378 HOSPITAL OBSERVATION PER HR: HCPCS

## 2023-03-30 PROCEDURE — 82962 GLUCOSE BLOOD TEST: CPT

## 2023-03-30 PROCEDURE — 80053 COMPREHEN METABOLIC PANEL: CPT | Performed by: EMERGENCY MEDICINE

## 2023-03-30 PROCEDURE — 85014 HEMATOCRIT: CPT | Performed by: INTERNAL MEDICINE

## 2023-03-30 PROCEDURE — 83605 ASSAY OF LACTIC ACID: CPT | Performed by: EMERGENCY MEDICINE

## 2023-03-30 PROCEDURE — 25010000002 PROMETHAZINE PER 50 MG: Performed by: INTERNAL MEDICINE

## 2023-03-30 PROCEDURE — 25010000002 ONDANSETRON PER 1 MG: Performed by: EMERGENCY MEDICINE

## 2023-03-30 PROCEDURE — 86850 RBC ANTIBODY SCREEN: CPT | Performed by: EMERGENCY MEDICINE

## 2023-03-30 PROCEDURE — 82805 BLOOD GASES W/O2 SATURATION: CPT

## 2023-03-30 PROCEDURE — 83036 HEMOGLOBIN GLYCOSYLATED A1C: CPT | Performed by: EMERGENCY MEDICINE

## 2023-03-30 PROCEDURE — 83930 ASSAY OF BLOOD OSMOLALITY: CPT | Performed by: EMERGENCY MEDICINE

## 2023-03-30 PROCEDURE — 82270 OCCULT BLOOD FECES: CPT | Performed by: EMERGENCY MEDICINE

## 2023-03-30 PROCEDURE — 83050 HGB METHEMOGLOBIN QUAN: CPT

## 2023-03-30 PROCEDURE — 25010000002 PROMETHAZINE PER 50 MG: Performed by: EMERGENCY MEDICINE

## 2023-03-30 PROCEDURE — 93005 ELECTROCARDIOGRAM TRACING: CPT | Performed by: EMERGENCY MEDICINE

## 2023-03-30 PROCEDURE — 84484 ASSAY OF TROPONIN QUANT: CPT | Performed by: EMERGENCY MEDICINE

## 2023-03-30 PROCEDURE — 36415 COLL VENOUS BLD VENIPUNCTURE: CPT

## 2023-03-30 PROCEDURE — 81001 URINALYSIS AUTO W/SCOPE: CPT

## 2023-03-30 PROCEDURE — 83690 ASSAY OF LIPASE: CPT | Performed by: EMERGENCY MEDICINE

## 2023-03-30 PROCEDURE — 82271 OCCULT BLOOD OTHER SOURCES: CPT | Performed by: EMERGENCY MEDICINE

## 2023-03-30 PROCEDURE — 84100 ASSAY OF PHOSPHORUS: CPT | Performed by: EMERGENCY MEDICINE

## 2023-03-30 PROCEDURE — 85018 HEMOGLOBIN: CPT | Performed by: INTERNAL MEDICINE

## 2023-03-30 PROCEDURE — 82010 KETONE BODYS QUAN: CPT | Performed by: EMERGENCY MEDICINE

## 2023-03-30 PROCEDURE — 36600 WITHDRAWAL OF ARTERIAL BLOOD: CPT

## 2023-03-30 PROCEDURE — 85025 COMPLETE CBC W/AUTO DIFF WBC: CPT | Performed by: EMERGENCY MEDICINE

## 2023-03-30 PROCEDURE — 25010000002 POTASSIUM CHLORIDE PER 2 MEQ OF POTASSIUM: Performed by: EMERGENCY MEDICINE

## 2023-03-30 PROCEDURE — 74176 CT ABD & PELVIS W/O CONTRAST: CPT

## 2023-03-30 PROCEDURE — 83735 ASSAY OF MAGNESIUM: CPT | Performed by: EMERGENCY MEDICINE

## 2023-03-30 PROCEDURE — 99291 CRITICAL CARE FIRST HOUR: CPT | Performed by: INTERNAL MEDICINE

## 2023-03-30 PROCEDURE — 86900 BLOOD TYPING SEROLOGIC ABO: CPT | Performed by: EMERGENCY MEDICINE

## 2023-03-30 PROCEDURE — 86901 BLOOD TYPING SEROLOGIC RH(D): CPT | Performed by: EMERGENCY MEDICINE

## 2023-03-30 PROCEDURE — 99285 EMERGENCY DEPT VISIT HI MDM: CPT

## 2023-03-30 PROCEDURE — 71046 X-RAY EXAM CHEST 2 VIEWS: CPT

## 2023-03-30 PROCEDURE — 25010000002 MAGNESIUM SULFATE 2 GM/50ML SOLUTION: Performed by: NURSE PRACTITIONER

## 2023-03-30 PROCEDURE — 0 POTASSIUM CHLORIDE 10 MEQ/100ML SOLUTION: Performed by: NURSE PRACTITIONER

## 2023-03-30 RX ORDER — SODIUM CHLORIDE 0.9 % (FLUSH) 0.9 %
10 SYRINGE (ML) INJECTION AS NEEDED
Status: DISCONTINUED | OUTPATIENT
Start: 2023-03-30 | End: 2023-03-31

## 2023-03-30 RX ORDER — DEXTROSE, SODIUM CHLORIDE, AND POTASSIUM CHLORIDE 5; .45; .3 G/100ML; G/100ML; G/100ML
150 INJECTION INTRAVENOUS CONTINUOUS PRN
Status: DISCONTINUED | OUTPATIENT
Start: 2023-03-30 | End: 2023-03-31

## 2023-03-30 RX ORDER — SODIUM CHLORIDE 0.9 % (FLUSH) 0.9 %
10 SYRINGE (ML) INJECTION EVERY 12 HOURS SCHEDULED
Status: DISCONTINUED | OUTPATIENT
Start: 2023-03-30 | End: 2023-04-01

## 2023-03-30 RX ORDER — ONDANSETRON 2 MG/ML
4 INJECTION INTRAMUSCULAR; INTRAVENOUS ONCE
Status: COMPLETED | OUTPATIENT
Start: 2023-03-30 | End: 2023-03-30

## 2023-03-30 RX ORDER — DEXTROSE, SODIUM CHLORIDE, AND POTASSIUM CHLORIDE 5; .9; .15 G/100ML; G/100ML; G/100ML
150 INJECTION INTRAVENOUS CONTINUOUS PRN
Status: DISCONTINUED | OUTPATIENT
Start: 2023-03-30 | End: 2023-03-31

## 2023-03-30 RX ORDER — NICOTINE POLACRILEX 4 MG
15 LOZENGE BUCCAL
Status: DISCONTINUED | OUTPATIENT
Start: 2023-03-30 | End: 2023-04-01

## 2023-03-30 RX ORDER — PANTOPRAZOLE SODIUM 40 MG/10ML
40 INJECTION, POWDER, LYOPHILIZED, FOR SOLUTION INTRAVENOUS EVERY 12 HOURS SCHEDULED
Status: DISCONTINUED | OUTPATIENT
Start: 2023-03-30 | End: 2023-03-31

## 2023-03-30 RX ORDER — DEXTROSE MONOHYDRATE 25 G/50ML
10-50 INJECTION, SOLUTION INTRAVENOUS
Status: DISCONTINUED | OUTPATIENT
Start: 2023-03-30 | End: 2023-04-01

## 2023-03-30 RX ORDER — DEXTROSE AND SODIUM CHLORIDE 5; .45 G/100ML; G/100ML
150 INJECTION, SOLUTION INTRAVENOUS CONTINUOUS PRN
Status: DISCONTINUED | OUTPATIENT
Start: 2023-03-30 | End: 2023-03-31

## 2023-03-30 RX ORDER — POTASSIUM CHLORIDE 7.45 MG/ML
10 INJECTION INTRAVENOUS
Status: COMPLETED | OUTPATIENT
Start: 2023-03-30 | End: 2023-03-31

## 2023-03-30 RX ORDER — SODIUM CHLORIDE AND POTASSIUM CHLORIDE 150; 450 MG/100ML; MG/100ML
250 INJECTION, SOLUTION INTRAVENOUS CONTINUOUS PRN
Status: DISCONTINUED | OUTPATIENT
Start: 2023-03-30 | End: 2023-03-31

## 2023-03-30 RX ORDER — HYDRALAZINE HYDROCHLORIDE 20 MG/ML
20 INJECTION INTRAMUSCULAR; INTRAVENOUS ONCE
Status: DISCONTINUED | OUTPATIENT
Start: 2023-03-30 | End: 2023-03-30

## 2023-03-30 RX ORDER — DEXTROSE, SODIUM CHLORIDE, AND POTASSIUM CHLORIDE 5; .45; .15 G/100ML; G/100ML; G/100ML
150 INJECTION INTRAVENOUS CONTINUOUS PRN
Status: DISCONTINUED | OUTPATIENT
Start: 2023-03-30 | End: 2023-03-31

## 2023-03-30 RX ORDER — SODIUM CHLORIDE 9 MG/ML
40 INJECTION, SOLUTION INTRAVENOUS AS NEEDED
Status: DISCONTINUED | OUTPATIENT
Start: 2023-03-30 | End: 2023-04-01

## 2023-03-30 RX ORDER — PANTOPRAZOLE SODIUM 40 MG/10ML
80 INJECTION, POWDER, LYOPHILIZED, FOR SOLUTION INTRAVENOUS ONCE
Status: COMPLETED | OUTPATIENT
Start: 2023-03-30 | End: 2023-03-30

## 2023-03-30 RX ORDER — SODIUM CHLORIDE 450 MG/100ML
250 INJECTION, SOLUTION INTRAVENOUS CONTINUOUS PRN
Status: DISCONTINUED | OUTPATIENT
Start: 2023-03-30 | End: 2023-03-31

## 2023-03-30 RX ORDER — SODIUM CHLORIDE 9 MG/ML
250 INJECTION, SOLUTION INTRAVENOUS CONTINUOUS PRN
Status: DISCONTINUED | OUTPATIENT
Start: 2023-03-30 | End: 2023-03-31

## 2023-03-30 RX ORDER — IBUPROFEN 600 MG/1
1 TABLET ORAL
Status: DISCONTINUED | OUTPATIENT
Start: 2023-03-30 | End: 2023-04-01

## 2023-03-30 RX ORDER — SODIUM CHLORIDE AND POTASSIUM CHLORIDE 300; 900 MG/100ML; MG/100ML
250 INJECTION, SOLUTION INTRAVENOUS CONTINUOUS PRN
Status: DISCONTINUED | OUTPATIENT
Start: 2023-03-30 | End: 2023-03-31

## 2023-03-30 RX ORDER — MAGNESIUM SULFATE HEPTAHYDRATE 40 MG/ML
2 INJECTION, SOLUTION INTRAVENOUS
Status: COMPLETED | OUTPATIENT
Start: 2023-03-30 | End: 2023-03-31

## 2023-03-30 RX ORDER — SODIUM CHLORIDE 0.9 % (FLUSH) 0.9 %
10 SYRINGE (ML) INJECTION AS NEEDED
Status: DISCONTINUED | OUTPATIENT
Start: 2023-03-30 | End: 2023-04-04 | Stop reason: HOSPADM

## 2023-03-30 RX ORDER — POTASSIUM CHLORIDE, DEXTROSE MONOHYDRATE AND SODIUM CHLORIDE 300; 5; 900 MG/100ML; G/100ML; MG/100ML
150 INJECTION, SOLUTION INTRAVENOUS CONTINUOUS PRN
Status: DISCONTINUED | OUTPATIENT
Start: 2023-03-30 | End: 2023-03-31

## 2023-03-30 RX ORDER — DEXTROSE AND SODIUM CHLORIDE 5; .9 G/100ML; G/100ML
150 INJECTION, SOLUTION INTRAVENOUS CONTINUOUS PRN
Status: DISCONTINUED | OUTPATIENT
Start: 2023-03-30 | End: 2023-03-31

## 2023-03-30 RX ORDER — SODIUM CHLORIDE AND POTASSIUM CHLORIDE 150; 900 MG/100ML; MG/100ML
250 INJECTION, SOLUTION INTRAVENOUS CONTINUOUS PRN
Status: DISCONTINUED | OUTPATIENT
Start: 2023-03-30 | End: 2023-03-31

## 2023-03-30 RX ADMIN — SODIUM CHLORIDE 1000 ML/HR: 9 INJECTION, SOLUTION INTRAVENOUS at 16:45

## 2023-03-30 RX ADMIN — MAGNESIUM SULFATE HEPTAHYDRATE 2 G: 2 INJECTION, SOLUTION INTRAVENOUS at 23:03

## 2023-03-30 RX ADMIN — PANTOPRAZOLE SODIUM 80 MG: 40 INJECTION, POWDER, LYOPHILIZED, FOR SOLUTION INTRAVENOUS at 13:12

## 2023-03-30 RX ADMIN — POTASSIUM CHLORIDE 250 ML/HR: 2 INJECTION, SOLUTION, CONCENTRATE INTRAVENOUS at 20:01

## 2023-03-30 RX ADMIN — NICARDIPINE HYDROCHLORIDE 10 MG/HR: 25 INJECTION, SOLUTION INTRAVENOUS at 23:09

## 2023-03-30 RX ADMIN — PANTOPRAZOLE SODIUM 40 MG: 40 INJECTION, POWDER, LYOPHILIZED, FOR SOLUTION INTRAVENOUS at 23:04

## 2023-03-30 RX ADMIN — POTASSIUM CHLORIDE, DEXTROSE MONOHYDRATE AND SODIUM CHLORIDE 150 ML/HR: 300; 5; 450 INJECTION, SOLUTION INTRAVENOUS at 23:02

## 2023-03-30 RX ADMIN — NICARDIPINE HYDROCHLORIDE 5 MG/HR: 25 INJECTION, SOLUTION INTRAVENOUS at 16:39

## 2023-03-30 RX ADMIN — SODIUM CHLORIDE 1000 ML: 9 INJECTION, SOLUTION INTRAVENOUS at 14:25

## 2023-03-30 RX ADMIN — Medication 10 ML: at 21:55

## 2023-03-30 RX ADMIN — SODIUM CHLORIDE 1000 ML: 9 INJECTION, SOLUTION INTRAVENOUS at 12:40

## 2023-03-30 RX ADMIN — ONDANSETRON 4 MG: 2 INJECTION INTRAMUSCULAR; INTRAVENOUS at 12:39

## 2023-03-30 RX ADMIN — INSULIN HUMAN 3.7 UNITS/HR: 1 INJECTION, SOLUTION INTRAVENOUS at 17:41

## 2023-03-30 RX ADMIN — PROMETHAZINE HYDROCHLORIDE 12.5 MG: 25 INJECTION INTRAMUSCULAR; INTRAVENOUS at 15:18

## 2023-03-30 RX ADMIN — ONDANSETRON 4 MG: 2 INJECTION INTRAMUSCULAR; INTRAVENOUS at 13:13

## 2023-03-30 RX ADMIN — NICARDIPINE HYDROCHLORIDE 10 MG/HR: 25 INJECTION, SOLUTION INTRAVENOUS at 19:53

## 2023-03-30 RX ADMIN — POTASSIUM CHLORIDE 10 MEQ: 7.46 INJECTION, SOLUTION INTRAVENOUS at 23:03

## 2023-03-30 NOTE — ED PROVIDER NOTES
EMERGENCY DEPARTMENT ENCOUNTER    Pt Name: Thelma Michael  MRN: [id number]  Pt :   1960  Room Number:    Date of encounter:  3/30/2023  PCP: Monet Howe APRN  ED Provider: Thom Chaves MD    Historian: Patient and paramedic      HPI:  Chief Complaint: Nausea, vomiting, abdominal pain        Context: Thelma Michael is a 62 y.o. female who presents to the ED c/o nausea vomiting and abdominal pain which have been ongoing for roughly 48 hours.  Her symptoms significantly worsened around 4 this morning.  She notes a long history of gastroparesis and notes that this feels very similar to prior episodes.  She additionally notes that her blood sugars have been running in the 200s and notes that the sensor on her insulin pump is not working properly so the pump is turned off.  Paramedics report the patient has been in moderate distress throughout the transport.  Their fingerstick blood sugar was 333.      PAST MEDICAL HISTORY  Past Medical History:   Diagnosis Date   • Acid reflux    • Acute bronchitis    • Cardiac murmur    • Diabetes mellitus (HCC)    • H/O echocardiogram 2012    i. LVEF 65%.ii. Mild LVH.iii. Borderline evidence of atrial septal aneurysm.  No PFO.    • History of nuclear stress test 2014    Negative for ischemia and scars; LVEF 77%.     • Hyperlipidemia    • Hypertension    • Impacted cerumen of both ears    • Migraine    • Self-catheterizes urinary bladder    • Sinusitis    • Stroke (HCC)    • Tobacco abuse     quit 4 days ago.     • Urticaria          PAST SURGICAL HISTORY  Past Surgical History:   Procedure Laterality Date   • CAPSULE ENDOSCOPY  2021    Procedure: PILLCAM DEPLOYMENT;  Surgeon: Mikael Worthy MD;  Location:  [facility];  Service: Gastroenterology;;   • COLONOSCOPY     • COLONOSCOPY N/A 2021    Procedure: COLONOSCOPY;  Surgeon: Mikael Worthy MD;  Location:  [facility];  Service: Gastroenterology;   Laterality: N/A;   • DENTAL PROCEDURE     • ENDOSCOPY N/A 06/30/2021    Procedure: ESOPHAGOGASTRODUODENOSCOPY;  Surgeon: Brunner, Mark I, MD;  Location:  [facility];  Service: Gastroenterology;  Laterality: N/A;   • ENDOSCOPY N/A 07/27/2021    Procedure: ESOPHAGOGASTRODUODENOSCOPY;  Surgeon: Mikael Worthy MD;  Location:  [facility];  Service: Gastroenterology;  Laterality: N/A;   • ENDOSCOPY WITH JTUBE N/A 03/16/2022    Procedure: ESOPHAGOGASTRODUODENOSCOPY WITH JEJUNAL TUBE INSERTION;  Surgeon: Thom Glover MD;  Location:  [facility];  Service: Gastroenterology;  Laterality: N/A;   • UPPER GASTROINTESTINAL ENDOSCOPY           FAMILY HISTORY  Family History   Problem Relation Age of Onset   • Anxiety disorder Other    • Arthritis Other    • ADD / ADHD Other    • Heart disease Other         cardiac disorder   • Depression Other    • Diabetes Other    • Hyperlipidemia Other    • Hypertension Other    • Lung cancer Other    • Osteoporosis Other    • Hypertension Brother    • Diabetes Brother    • Hyperlipidemia Mother    • Hypertension Mother    • Colon cancer Neg Hx          SOCIAL HISTORY  Social History     Socioeconomic History   • Marital status:    Tobacco Use   • Smoking status: Former     Packs/day: 0.25     Years: 30.00     Pack years: 7.50     Types: Cigarettes     Quit date: 5/28/2019     Years since quitting: 3.8   • Smokeless tobacco: Never   • Tobacco comments:     BC PL never smoker    Vaping Use   • Vaping Use: Never used   Substance and Sexual Activity   • Alcohol use: Yes     Alcohol/week: 1.0 standard drink     Types: 1 Glasses of wine per week     Comment: ocassional   • Drug use: No   • Sexual activity: Not Currently     Comment: Single          ALLERGIES  Patient has no known allergies.        REVIEW OF SYSTEMS  Review of Systems       All systems reviewed and negative except for those discussed in HPI.       PHYSICAL EXAM    I have reviewed the triage vital signs  and nursing notes.    ED Triage Vitals   Temp Pulse Resp BP SpO2   -- -- -- -- --      Temp src Heart Rate Source Patient Position BP Location FiO2 (%)   -- -- -- -- --       Physical Exam  GENERAL:   Appears uncomfortable and nauseated.  HENT: Nares patent.  Dry mucous membranes  EYES: No scleral icterus.  CV: Regular rhythm, regular rate.  No murmurs gallops rubs  RESPIRATORY: Normal effort.  No audible wheezes, rales or rhonchi.  Clear to auscultation  ABDOMEN: Soft, mildly diffusely tender to palpation.  Bowel sounds are present.  MUSCULOSKELETAL: No deformities.   NEURO: Alert, moves all extremities, follows commands.  SKIN: Warm, dry, no rash visualized.      LAB RESULTS  Recent Results (from the past 24 hour(s))   Lavender Top    Collection Time: 03/30/23 12:33 PM   Result Value Ref Range    Extra Tube hold for add-on    CBC Auto Differential    Collection Time: 03/30/23 12:33 PM    Specimen: Blood   Result Value Ref Range    WBC 8.79 3.40 - 10.80 10*3/mm3    RBC 4.41 3.77 - 5.28 10*6/mm3    Hemoglobin 13.3 12.0 - 15.9 g/dL    Hematocrit 38.3 34.0 - 46.6 %    MCV 86.8 79.0 - 97.0 fL    MCH 30.2 26.6 - 33.0 pg    MCHC 34.7 31.5 - 35.7 g/dL    RDW 13.0 12.3 - 15.4 %    RDW-SD 41.0 37.0 - 54.0 fl    MPV 10.5 6.0 - 12.0 fL    Platelets 405 140 - 450 10*3/mm3    Neutrophil % 77.4 (H) 42.7 - 76.0 %    Lymphocyte % 17.5 (L) 19.6 - 45.3 %    Monocyte % 4.4 (L) 5.0 - 12.0 %    Eosinophil % 0.0 (L) 0.3 - 6.2 %    Basophil % 0.2 0.0 - 1.5 %    Immature Grans % 0.5 0.0 - 0.5 %    Neutrophils, Absolute 6.80 1.70 - 7.00 10*3/mm3    Lymphocytes, Absolute 1.54 0.70 - 3.10 10*3/mm3    Monocytes, Absolute 0.39 0.10 - 0.90 10*3/mm3    Eosinophils, Absolute 0.00 0.00 - 0.40 10*3/mm3    Basophils, Absolute 0.02 0.00 - 0.20 10*3/mm3    Immature Grans, Absolute 0.04 0.00 - 0.05 10*3/mm3    nRBC 0.0 0.0 - 0.2 /100 WBC   Occult Blood Gastric / Duodenum - Gastric Contents,    Collection Time: 03/30/23  1:06 PM    Specimen: Gastric  Contents   Result Value Ref Range    Gastroccult Positive (A) Negative   POC Occult Blood Stool    Collection Time: 03/30/23  1:10 PM    Specimen: Per Rectum; Stool   Result Value Ref Range    Fecal Occult Blood Negative     Lot Number [id number]     Expiration Date 10/25     DEVELOPER LOT NUMBER [id number]     DEVELOPER EXPIRATION DATE 5/26     Positive Control Positive     Negative Control Negative    Comprehensive Metabolic Panel    Collection Time: 03/30/23  1:16 PM    Specimen: Blood   Result Value Ref Range    Glucose 449 (C) 65 - 99 mg/dL    BUN 29 (H) 8 - 23 mg/dL    Creatinine 1.81 (H) 0.57 - 1.00 mg/dL    Sodium 142 136 - 145 mmol/L    Potassium 3.1 (L) 3.5 - 5.2 mmol/L    Chloride 97 (L) 98 - 107 mmol/L    CO2 17.0 (L) 22.0 - 29.0 mmol/L    Calcium 9.9 8.6 - 10.5 mg/dL    Total Protein 7.7 6.0 - 8.5 g/dL    Albumin 4.3 3.5 - 5.2 g/dL    ALT (SGPT) 10 1 - 33 U/L    AST (SGOT) 16 1 - 32 U/L    Alkaline Phosphatase 109 39 - 117 U/L    Total Bilirubin 0.7 0.0 - 1.2 mg/dL    Globulin 3.4 gm/dL    A/G Ratio 1.3 g/dL    BUN/Creatinine Ratio 16.0 7.0 - 25.0    Anion Gap 28.0 (H) 5.0 - 15.0 mmol/L    eGFR 30.9 (L) >60.0 mL/min/1.73   Lipase    Collection Time: 03/30/23  1:16 PM    Specimen: Blood   Result Value Ref Range    Lipase 22 13 - 60 U/L   Lactic Acid, Plasma    Collection Time: 03/30/23  1:16 PM    Specimen: Blood   Result Value Ref Range    Lactate 6.0 (C) 0.5 - 2.0 mmol/L   Green Top (Gel)    Collection Time: 03/30/23  1:16 PM   Result Value Ref Range    Extra Tube Hold for add-ons.    Gold Top - SST    Collection Time: 03/30/23  1:16 PM   Result Value Ref Range    Extra Tube Hold for add-ons.    Light Blue Top    Collection Time: 03/30/23  1:16 PM   Result Value Ref Range    Extra Tube Hold for add-ons.    Type & Screen    Collection Time: 03/30/23  1:18 PM    Specimen: Blood   Result Value Ref Range    ABO Type B     RH type Positive     Antibody Screen Negative     T&S Expiration Date 4/2/2023 11:59:59 PM         If labs were ordered, I independently reviewed the results and considered them in treating the patient.        RADIOLOGY  No Radiology Exams Resulted Within Past 24 Hours    I ordered and independently reviewed the above noted radiographic studies.      I viewed images of CT abdomen pelvis which showed no signs of obstruction per my independent interpretation.    See radiologist's dictation for official interpretation.        PROCEDURES    Critical Care  Performed by: Thom Chaves MD  Authorized by: Thom Chaves MD     Critical care provider statement:     Critical care time (minutes):  35    Critical care time was exclusive of:  Separately billable procedures and treating other patients    Critical care was necessary to treat or prevent imminent or life-threatening deterioration of the following conditions:  Circulatory failure, metabolic crisis and dehydration    Critical care was time spent personally by me on the following activities:  Ordering and performing treatments and interventions, ordering and review of laboratory studies, ordering and review of radiographic studies, pulse oximetry, re-evaluation of patient's condition, review of old charts, obtaining history from patient or surrogate, examination of patient, evaluation of patient's response to treatment, discussions with consultants and development of treatment plan with patient or surrogate        No orders to display       MEDICATIONS GIVEN IN ER    Medications   sodium chloride 0.9 % flush 10 mL (has no administration in time range)   promethazine (PHENERGAN) 12.5 mg in sodium chloride 0.9 % 50 mL (has no administration in time range)   hydrALAZINE (APRESOLINE) injection 20 mg (has no administration in time range)   sodium chloride 0.9 % bolus 1,000 mL (1,000 mL Intravenous New Bag 3/30/23 1240)   ondansetron (ZOFRAN) injection 4 mg (4 mg Intravenous Given 3/30/23 1239)   pantoprazole (PROTONIX) injection 80 mg (80 mg Intravenous Given  3/30/23 1312)   ondansetron (ZOFRAN) injection 4 mg (4 mg Intravenous Given 3/30/23 1313)   sodium chloride 0.9 % bolus 1,000 mL (1,000 mL Intravenous New Bag 3/30/23 1425)         MEDICAL DECISION MAKING, PROGRESS, and CONSULTS    All labs have been independently reviewed by me.  All radiology studies have been reviewed by me and the radiologist dictating the report.  All EKG's have been independently viewed and interpreted by me.      Discussion below represents my analysis of pertinent findings related to patient's condition, differential diagnosis, treatment plan and final disposition.      Differential diagnosis:    Gastroparesis versus bowel obstruction versus occult infection to include UTI, etc.      Additional sources:    - Discussed/ obtained information from independent historians: Paramedics gave good report    - External (non-ED) record review: Epic chart reviewed    - Chronic or social conditions impacting care: Chronic gastroparesis    - Shared decision making: Patient in full agreement with current plan      Orders placed during this visit:  Orders Placed This Encounter   Procedures   • CT Abdomen Pelvis Without Contrast   • [place] Draw   • Comprehensive Metabolic Panel   • Lipase   • Urinalysis With Microscopic If Indicated (No Culture) - Urine, Clean Catch   • Lactic Acid, Plasma   • CBC Auto Differential   • Occult Blood Gastric / Duodenum - Gastric Contents,   • STAT Lactic Acid, Reflex   • Urinalysis With Microscopic If Indicated (No Culture) - Urine, Catheter   • Blood Gas, Arterial -With Co-Ox Panel: Yes   • Beta Hydroxybutyrate Quantitative   • Cath UA and drain bladder.  Record amount please  Misc Nursing Order (Specify)   • POC Occult Blood Stool   • Type & Screen   • Insert Peripheral IV   • CBC & Differential   • Green Top (Gel)   • Lavender Top   • Gold Top - SST   • Gray Top   • Light Blue Top   • Ketone Bodies, Serum (Not performed at [facility])         Additional orders considered but  not ordered:  Initially did not order CT scan of the abdomen pelvis secondary to radiation exposure in this chronically ill patient.    ED Course:    Consultants:      ED Course as of 04/06/23 2228   u Mar 30, 2023   1229 Emergency department technician reports coffee-ground emesis while patient was in the bathroom having a bowel movement. [MS]   1254 I have ordered occult blood gastric as well as occult blood stool tests [MS]   1255 We are hydrating and administering Zofran. [MS]   1255 Hemoglobin: 13.3 [MS]   1255 Hematocrit: 38.3 [MS]   1355 Giving multiple boluses of electrolyte fluids.  Zofran x2 still somewhat ineffective.  I have ordered Phenergan IV. [MS]   1420 Canceling the CTA abdomen pelvis with GI bleed protocol.  Ordering a simple CT scan abdomen pelvis without contrast.  Continuing resuscitation. [MS]   1528 Hydralazine as not been administered yet.  Blood pressure is still markedly elevated at 242/128 even when I am at the bedside ensuring that the patient's arm is relaxed.  Have now ordered a Cardene drip and will admit to the ICU. [MS]   1531 I have initiated Glucomander insulin orders.  I have contacted Dr. Griggs, intensivist for admission. [MS]   1533 I spoke with Dr. Griggs who will admit. [MS]      ED Course User Index  [MS] Thom Chaves MD                  AS OF 15:06 EDT VITALS:    BP - (!) 239/115  HR - 93  TEMP - 97.3 °F (36.3 °C) (Oral)  O2 SATS - 97%                  DIAGNOSIS  Final diagnoses:   Intractable vomiting   Hematemesis with nausea   Acute kidney injury   Hyperglycemia   Dehydration   Diabetic ketoacidosis without coma associated with other specified diabetes mellitus         DISPOSITION  Admission      Please note that portions of this document were completed with voice recognition software.      Thom Chaves MD  04/06/23 2230

## 2023-03-30 NOTE — Clinical Note
Level of Care: Telemetry [5]   Diagnosis: Intractable vomiting [[id number]]   Admitting Physician: ANALISA THURSTON [[id number]   Attending Physician: ANALISA THURSTON [[id number]]

## 2023-03-30 NOTE — H&P
INTENSIVIST   INITIAL HOSPITAL VISIT NOTE     Hospital:  LOS: 0 days     Ms. Thelma Michael, 62 y.o. female is seen for a Chief Complaint of:  Chief Complaint   Patient presents with   • Nausea     Pt presents via EMS with c/o nausea.        DKA (diabetic ketoacidoses)    Uncontrolled type 1 diabetes mellitus with hyperglycemia (HCC)    Intractable vomiting    Subjective   S     Thelma Michael is a 62 year-old female with PMH of T1DM, Afib on Xarelto, iron deficiency anemia, gastroparesis, hyperlipidemia, HTN, migraines, self-catheterizations of bladder, strokes (chronic right lentiform nucleus and left cerebellar lacunar infarcts found on MRI in February of 2023), tobacco abuse, and GERD that presented to [facility] on 3/3023 for nausea, vomiting, and abdominal pain for 48 hours. Her symptoms significantly worsened over the past 4 hours. She was concerned about gastroparesis, as she has experienced these symptoms with prior episodes. She has also noted that her blood sugars have been running in the 200s and her insulin pump has been turned off. EMS was called. Fingerstick glucose was checked which was in the 300s and she was brought to the ED for further evaluation.     In the ED, Glucose was 449 with beta hydroxy of 3.465. ABG is pending. Other labs showed lactate 6 and K 3.1. Also demonstrating an RAFAT with creatinine of 1.81. CT a/p showed redemonstrated abnormal circumferential wall thickening of the bladder, with a large superiorly projecting diverticulum noted. She has been hypertensive and Cardene gtt has been ordered.      Also demonstrated some concerns for GIB with coffee ground emesis noted by staff; she has a positive occult. She has also received Zofran, Protonix, and promethazine in the ED and started on the DKA protocol. She will be admitted to the ICU for further work-up and care.     Of note, patient was recently admitted to [facility] in February for dehydration 2* nausea and vomiting, previously  diagnosed with gastric paresis, and has had a gastric stimulator placed since.      Time spent: 10 minutes  Electronically signed by HOWIE Ambrosio, 03/30/23, 4:09 PM EDT.     PMH: She  has a past medical history of Acid reflux, Acute bronchitis, Cardiac murmur, Diabetes mellitus (HCC), H/O echocardiogram (08/07/2012), History of nuclear stress test (08/22/2014), Hyperlipidemia, Hypertension, Impacted cerumen of both ears, Migraine, Self-catheterizes urinary bladder, Sinusitis, Stroke (HCC), Tobacco abuse, and Urticaria.   PSxH: She  has a past surgical history that includes Dental surgery; Colonoscopy; Esophagogastroduodenoscopy (N/A, 06/30/2021); Colonoscopy (N/A, 07/27/2021); Esophagogastroduodenoscopy (N/A, 07/27/2021); Capsule Endoscopy (07/27/2021); endoscopy with jtube (N/A, 03/16/2022); and Upper gastrointestinal endoscopy.      Medications:  No current facility-administered medications on file prior to encounter.     Current Outpatient Medications on File Prior to Encounter   Medication Sig   • acetaminophen (TYLENOL) 325 MG tablet Take 2 tablets by mouth Every 4 (Four) Hours As Needed for Mild Pain .   • albuterol sulfate  (90 Base) MCG/ACT inhaler Inhale 2 puffs Every 4 (Four) Hours As Needed for Wheezing.   • Ascorbic Acid (VITAMIN C PO) Vitamin C TABS   • atorvastatin (LIPITOR) 80 MG tablet Take 1 tablet by mouth Every Night.   • calcium carbonate (TUMS) 500 MG chewable tablet Chew 1,000 mg 3 (Three) Times a Day As Needed for Indigestion or Heartburn.   • carvedilol (COREG) 6.25 MG tablet Take 1 tablet by mouth 2 (Two) Times a Day With Meals for 30 days.   • Continuous Blood Gluc Sensor (Dexcom G6 Sensor) USE ONE SENSOR EVERY 10 DAYS   • Continuous Blood Gluc Sensor (Dexcom G6 Sensor) Every 10 (Ten) Days.   • Continuous Blood Gluc Transmit (Dexcom G6 Transmitter) misc 1 each Every 3 (Three) Months.   • diphenhydrAMINE (BENADRYL) 25 MG tablet Take 1 tablet by mouth Every 6 (Six) Hours As  Needed for Itching.   • donepezil (Aricept) 5 MG tablet Take 1 tablet by mouth Every Night.   • doxazosin (CARDURA) 2 MG tablet Take 1 tablet by mouth Every Night.   • estradiol (ESTRACE VAGINAL) 0.1 MG/GM vaginal cream Insert 1 gm intravaginally twice weekly at bedtime.   • ferrous sulfate 325 (65 Fe) MG tablet TAKE 1 TABLET BY MOUTH ONCE DAILY WITH  BREAKFAST  **TAKE  WITH  ORANGE  JUICE  OR  VITAMIN  C**   • FLUoxetine (PROzac) 20 MG capsule Take 1 capsule by mouth Daily.   • glucose monitor monitoring kit 1 each 4 (Four) Times a Day.   • melatonin 5 MG tablet tablet Take 1 tablet by mouth At Night As Needed (sleep).   • mirtazapine (REMERON) 7.5 MG tablet Take 1 tablet by mouth Every Night.   • Multivitamin tablet tablet Take 1 tablet by mouth once daily   • pantoprazole (PROTONIX) 40 MG EC tablet Take 1 tablet by mouth Daily.   • rivaroxaban (XARELTO) 20 MG tablet Take 1 tablet by mouth Daily With Dinner.   • sennosides-docusate (senna-docusate sodium) 8.6-50 MG per tablet Take 2 tablets by mouth 2 (Two) Times a Day As Needed for Constipation.   • traMADol (ULTRAM) 50 MG tablet Take 1 tablet by mouth Every 6 (Six) Hours As Needed for Moderate Pain.   • vitamin D (ERGOCALCIFEROL) 1.25 MG (45091 UT) capsule capsule Take 1 capsule by mouth 1 (One) Time Per Week.   • [DISCONTINUED] Insulin Glargine (BASAGLAR KWIKPEN) 100 UNIT/ML injection pen Inject 10 Units under the skin into the appropriate area as directed Every Night.     Allergies: She has No Known Allergies.   FH: Her family history includes ADD / ADHD in an other family member; Anxiety disorder in an other family member; Arthritis in an other family member; Depression in an other family member; Diabetes in her brother and another family member; Heart disease in an other family member; Hyperlipidemia in her mother and another family member; Hypertension in her brother, mother, and another family member; Lung cancer in an other family member; Osteoporosis in  "an other family member.   SH: She  reports that she quit smoking about 3 years ago. Her smoking use included cigarettes. She has a 7.50 pack-year smoking history. She has never used smokeless tobacco. She reports current alcohol use of about 1.0 standard drink per week. She reports that she does not use drugs.     The patient's relevant past medical, surgical and social history were reviewed and updated in Epic as appropriate.     ROS (14): (+) abdominal pain, nausea, vomiting, HA    Objective   O     Vitals  Temp  Min: 97.3 °F (36.3 °C)  Max: 97.3 °F (36.3 °C)  BP  Min: 147/111  Max: 242/128  Pulse  Min: 93  Max: 111  Resp  Min: 22  Max: 24  SpO2  Min: 85 %  Max: 100 % No data recorded     Medications (drips):  dextrose 5 % and sodium chloride 0.45 %  dextrose 5 % and sodium chloride 0.45 % with KCl 20 mEq/L  dextrose 5 % and sodium chloride 0.45 % with KCl 40 mEq/L  dextrose 5 % and sodium chloride 0.9 %  dextrose 5 % and sodium chloride 0.9 % with KCl 20 mEq  dextrose 5% and sodium chloride 0.9% with KCl 40 mEq/L  insulin  niCARdipine  custom IV KCl infusion builder  sodium chloride  sodium chloride 0.45 % with KCl 20 mEq  sodium chloride  sodium chloride  sodium chloride 0.9 % with KCl 20 mEq  sodium chloride 0.9 % with KCl 40 mEq/L    Physical Examination  Vital Signs: Blood pressure 155/72, pulse 105, temperature 97.3 °F (36.3 °C), temperature source Oral, resp. rate 16, height 172.7 cm (68\"), weight 63.5 kg (140 lb), SpO2 100 %, not currently breastfeeding.    General: The patient appears in moderate distress.  Hypertensive, tachycardic.   ENT/Neck: Trachea midline, No masses, No JVD.  Dry appearing mucous membranes.  Chest: Clear to auscultation bilaterally, No wheezing, rhonchi, or rales. Normal work of breathing. Equal chest rise.  Cardiac: Regular rhythm, tachycardic. S1S2 auscultated. No murmurs, rubs or gallops.   Abdomen: Soft, non-distended.diffusely tender without localization.  No guarding.  No " rebound.  Positive bowel sounds in all four quadrants.   Extremities: No lower extremity edema. No clubbing or cyanosis.  Skin: No rashes, open wounds, or bruising. Warm, dry, well-perfused.  Neuro: Motor power grossly intact bilaterally. Speech fluid and fluent. Thought process coherent.  Psych: Alert and oriented x 3, Mood stable.    Lines, Drains & Airways     Active LDAs     Name Placement date Placement time Site Days    Peripheral IV 03/30/23 1231 Anterior;Left Hand 03/30/23  1231  Hand  less than 1    Insulin Pump 07/03/22  0800  -- 270              Results from last 7 days   Lab Units 03/30/23  1233   WBC 10*3/mm3 8.79   HEMOGLOBIN g/dL 13.3   MCV fL 86.8   PLATELETS 10*3/mm3 405     Results from last 7 days   Lab Units 03/30/23  1316   SODIUM mmol/L 142   POTASSIUM mmol/L 3.1*   CO2 mmol/L 17.0*   CREATININE mg/dL 1.81*     Estimated Creatinine Clearance: 31.5 mL/min (A) (by C-G formula based on SCr of 1.81 mg/dL (H)).  Results from last 7 days   Lab Units 03/30/23  1316   ALK PHOS U/L 109   BILIRUBIN mg/dL 0.7   ALT (SGPT) U/L 10   AST (SGOT) U/L 16     CT Abdomen/Pelvis (3/30/2023):   Radiology Impression:   - Stable CT appearance of the abdomen and pelvis as above, including circumferential wall thickening of the urinary bladder with a large superior projecting diverticulum present. There is no evidence of obstructive nephropathy. Colonic diverticulosis   changes are present without evidence of diverticulitis.  - Postoperative changes are present from apparent interval stimulator placement, with generator along the right lower abdomen and leads visualized terminating along the stomach.    I reviewed the patient's new laboratory and imaging results.  I independently reviewed the patient's new images.    Assessment & Plan   A / P     Active Hospital Problems    Diagnosis    • **DKA (diabetic ketoacidoses)    • Intractable vomiting    • Uncontrolled type 1 diabetes mellitus with hyperglycemia (HCC)       #DKA  #Intractable vomiting  #Elevated lactate  -Patient with anion gap metabolic acidosis, respiratory alkalosis  -Lactate elevated; trending down after resuscitation   -DKA likely precipitated by GI illness.  Elevated serum beta hydroxybutyrate.  Placed on DKA protocol following resuscitation with 2L of normal saline in the emergency department  -Zofran as needed  -A1c 8.0 on admission     #Hematemesis  -Per report but no active events since admission.  Started PPI twice daily.  Will follow H/H every 8 hours; initial stable at 13.3/38  -Diverticulosis without diverticulitis on CT abdomen/pelvis (3/30)    #Hypertensive urgency  -Initiated nicardipine drip    #RAFAT  #Hypokalemia  -Etiology likely prerenal in the setting of volume loss  -Replacing electrolytes per DKA protocol  -UA pending    F-NPO  A-NA  S-NA  T-SCDs  H-NA  U-PPI BID  G-DKA protocol   S-NA  B-Last bowel movement  I-PIV  D-NA    -- Yovani Griggs MD  Pulmonary/Critical Care    Critical Care time spent in direct patient care: 30 minutes (excluding procedure time, if applicable) including high complexity decision making to assess, manipulate, and support vital organ system failure in this individual who has impairment of one or more vital organ systems such that there is a high probability of imminent or life threatening deterioration in the patient’s condition.

## 2023-03-31 PROBLEM — F41.8 DEPRESSION WITH ANXIETY: Status: RESOLVED | Noted: 2020-10-05 | Resolved: 2023-03-31

## 2023-03-31 PROBLEM — G43.909 MIGRAINES: Status: RESOLVED | Noted: 2019-10-31 | Resolved: 2023-03-31

## 2023-03-31 PROBLEM — E87.6 HYPOKALEMIA: Status: RESOLVED | Noted: 2022-02-11 | Resolved: 2023-03-31

## 2023-03-31 PROBLEM — R77.9 ELEVATED SERUM PROTEIN LEVEL: Status: RESOLVED | Noted: 2022-02-11 | Resolved: 2023-03-31

## 2023-03-31 PROBLEM — E44.0 MODERATE MALNUTRITION: Status: RESOLVED | Noted: 2022-03-18 | Resolved: 2023-03-31

## 2023-03-31 PROBLEM — E86.9 VOLUME DEPLETION: Status: RESOLVED | Noted: 2023-02-03 | Resolved: 2023-03-31

## 2023-03-31 PROBLEM — F51.01 PRIMARY INSOMNIA: Status: RESOLVED | Noted: 2020-10-05 | Resolved: 2023-03-31

## 2023-03-31 PROBLEM — Z86.73 HISTORY OF TIAS: Status: RESOLVED | Noted: 2021-07-15 | Resolved: 2023-03-31

## 2023-03-31 LAB
ALBUMIN SERPL-MCNC: 3.7 G/DL (ref 3.5–5.2)
ALBUMIN/GLOB SERPL: 1.4 G/DL
ALP SERPL-CCNC: 82 U/L (ref 39–117)
ALT SERPL W P-5'-P-CCNC: 7 U/L (ref 1–33)
ANION GAP SERPL CALCULATED.3IONS-SCNC: 10 MMOL/L (ref 5–15)
ANION GAP SERPL CALCULATED.3IONS-SCNC: 10 MMOL/L (ref 5–15)
ANION GAP SERPL CALCULATED.3IONS-SCNC: 14 MMOL/L (ref 5–15)
AST SERPL-CCNC: 17 U/L (ref 1–32)
BASOPHILS # BLD AUTO: 0.01 10*3/MM3 (ref 0–0.2)
BASOPHILS NFR BLD AUTO: 0.1 % (ref 0–1.5)
BILIRUB SERPL-MCNC: 0.5 MG/DL (ref 0–1.2)
BUN SERPL-MCNC: 17 MG/DL (ref 8–23)
BUN SERPL-MCNC: 19 MG/DL (ref 8–23)
BUN SERPL-MCNC: 19 MG/DL (ref 8–23)
BUN/CREAT SERPL: 16.2 (ref 7–25)
BUN/CREAT SERPL: 16.2 (ref 7–25)
BUN/CREAT SERPL: 16.8 (ref 7–25)
CALCIUM SPEC-SCNC: 8.5 MG/DL (ref 8.6–10.5)
CALCIUM SPEC-SCNC: 8.7 MG/DL (ref 8.6–10.5)
CALCIUM SPEC-SCNC: 8.7 MG/DL (ref 8.6–10.5)
CHLORIDE SERPL-SCNC: 113 MMOL/L (ref 98–107)
CHLORIDE SERPL-SCNC: 114 MMOL/L (ref 98–107)
CHLORIDE SERPL-SCNC: 114 MMOL/L (ref 98–107)
CO2 SERPL-SCNC: 16 MMOL/L (ref 22–29)
CO2 SERPL-SCNC: 20 MMOL/L (ref 22–29)
CO2 SERPL-SCNC: 20 MMOL/L (ref 22–29)
CREAT SERPL-MCNC: 1.01 MG/DL (ref 0.57–1)
CREAT SERPL-MCNC: 1.17 MG/DL (ref 0.57–1)
CREAT SERPL-MCNC: 1.17 MG/DL (ref 0.57–1)
D-LACTATE SERPL-SCNC: 1.2 MMOL/L (ref 0.5–2)
DEPRECATED RDW RBC AUTO: 41.1 FL (ref 37–54)
EGFRCR SERPLBLD CKD-EPI 2021: 52.2 ML/MIN/1.73
EGFRCR SERPLBLD CKD-EPI 2021: 52.2 ML/MIN/1.73
EGFRCR SERPLBLD CKD-EPI 2021: 62.3 ML/MIN/1.73
EOSINOPHIL # BLD AUTO: 0 10*3/MM3 (ref 0–0.4)
EOSINOPHIL NFR BLD AUTO: 0 % (ref 0.3–6.2)
ERYTHROCYTE [DISTWIDTH] IN BLOOD BY AUTOMATED COUNT: 13 % (ref 12.3–15.4)
GLOBULIN UR ELPH-MCNC: 2.6 GM/DL
GLUCOSE BLDC GLUCOMTR-MCNC: 102 MG/DL (ref 70–130)
GLUCOSE BLDC GLUCOMTR-MCNC: 103 MG/DL (ref 70–130)
GLUCOSE BLDC GLUCOMTR-MCNC: 106 MG/DL (ref 70–130)
GLUCOSE BLDC GLUCOMTR-MCNC: 115 MG/DL (ref 70–130)
GLUCOSE BLDC GLUCOMTR-MCNC: 119 MG/DL (ref 70–130)
GLUCOSE BLDC GLUCOMTR-MCNC: 120 MG/DL (ref 70–130)
GLUCOSE BLDC GLUCOMTR-MCNC: 138 MG/DL (ref 70–130)
GLUCOSE BLDC GLUCOMTR-MCNC: 164 MG/DL (ref 70–130)
GLUCOSE BLDC GLUCOMTR-MCNC: 164 MG/DL (ref 70–130)
GLUCOSE BLDC GLUCOMTR-MCNC: 177 MG/DL (ref 70–130)
GLUCOSE BLDC GLUCOMTR-MCNC: 192 MG/DL (ref 70–130)
GLUCOSE BLDC GLUCOMTR-MCNC: 207 MG/DL (ref 70–130)
GLUCOSE BLDC GLUCOMTR-MCNC: 73 MG/DL (ref 70–130)
GLUCOSE BLDC GLUCOMTR-MCNC: 82 MG/DL (ref 70–130)
GLUCOSE BLDC GLUCOMTR-MCNC: 84 MG/DL (ref 70–130)
GLUCOSE SERPL-MCNC: 112 MG/DL (ref 65–99)
GLUCOSE SERPL-MCNC: 112 MG/DL (ref 65–99)
GLUCOSE SERPL-MCNC: 185 MG/DL (ref 65–99)
HCT VFR BLD AUTO: 29 % (ref 34–46.6)
HCT VFR BLD AUTO: 30.6 % (ref 34–46.6)
HGB BLD-MCNC: 10.1 G/DL (ref 12–15.9)
HGB BLD-MCNC: 10.4 G/DL (ref 12–15.9)
IMM GRANULOCYTES # BLD AUTO: 0.07 10*3/MM3 (ref 0–0.05)
IMM GRANULOCYTES NFR BLD AUTO: 0.6 % (ref 0–0.5)
LYMPHOCYTES # BLD AUTO: 1.43 10*3/MM3 (ref 0.7–3.1)
LYMPHOCYTES NFR BLD AUTO: 11.4 % (ref 19.6–45.3)
MAGNESIUM SERPL-MCNC: 3.2 MG/DL (ref 1.6–2.4)
MAGNESIUM SERPL-MCNC: 3.5 MG/DL (ref 1.6–2.4)
MCH RBC QN AUTO: 29.9 PG (ref 26.6–33)
MCHC RBC AUTO-ENTMCNC: 34 G/DL (ref 31.5–35.7)
MCV RBC AUTO: 87.9 FL (ref 79–97)
MONOCYTES # BLD AUTO: 0.97 10*3/MM3 (ref 0.1–0.9)
MONOCYTES NFR BLD AUTO: 7.7 % (ref 5–12)
NEUTROPHILS NFR BLD AUTO: 10.04 10*3/MM3 (ref 1.7–7)
NEUTROPHILS NFR BLD AUTO: 80.2 % (ref 42.7–76)
NRBC BLD AUTO-RTO: 0 /100 WBC (ref 0–0.2)
PHOSPHATE SERPL-MCNC: 0.7 MG/DL (ref 2.5–4.5)
PHOSPHATE SERPL-MCNC: 2.5 MG/DL (ref 2.5–4.5)
PLATELET # BLD AUTO: 325 10*3/MM3 (ref 140–450)
PMV BLD AUTO: 10.3 FL (ref 6–12)
POTASSIUM SERPL-SCNC: 3.8 MMOL/L (ref 3.5–5.2)
POTASSIUM SERPL-SCNC: 3.8 MMOL/L (ref 3.5–5.2)
POTASSIUM SERPL-SCNC: 4.5 MMOL/L (ref 3.5–5.2)
PROT SERPL-MCNC: 6.3 G/DL (ref 6–8.5)
RBC # BLD AUTO: 3.48 10*6/MM3 (ref 3.77–5.28)
SODIUM SERPL-SCNC: 143 MMOL/L (ref 136–145)
SODIUM SERPL-SCNC: 144 MMOL/L (ref 136–145)
SODIUM SERPL-SCNC: 144 MMOL/L (ref 136–145)
WBC NRBC COR # BLD: 12.52 10*3/MM3 (ref 3.4–10.8)

## 2023-03-31 PROCEDURE — 83605 ASSAY OF LACTIC ACID: CPT | Performed by: EMERGENCY MEDICINE

## 2023-03-31 PROCEDURE — 25010000002 MAGNESIUM SULFATE 2 GM/50ML SOLUTION: Performed by: NURSE PRACTITIONER

## 2023-03-31 PROCEDURE — 82962 GLUCOSE BLOOD TEST: CPT

## 2023-03-31 PROCEDURE — 84100 ASSAY OF PHOSPHORUS: CPT | Performed by: EMERGENCY MEDICINE

## 2023-03-31 PROCEDURE — 25010000002 PROMETHAZINE PER 50 MG: Performed by: INTERNAL MEDICINE

## 2023-03-31 PROCEDURE — 85025 COMPLETE CBC W/AUTO DIFF WBC: CPT | Performed by: NURSE PRACTITIONER

## 2023-03-31 PROCEDURE — 85018 HEMOGLOBIN: CPT | Performed by: INTERNAL MEDICINE

## 2023-03-31 PROCEDURE — 25010000002 PROMETHAZINE PER 50 MG: Performed by: EMERGENCY MEDICINE

## 2023-03-31 PROCEDURE — 0 POTASSIUM CHLORIDE 10 MEQ/100ML SOLUTION: Performed by: NURSE PRACTITIONER

## 2023-03-31 PROCEDURE — 85014 HEMATOCRIT: CPT | Performed by: INTERNAL MEDICINE

## 2023-03-31 PROCEDURE — 80053 COMPREHEN METABOLIC PANEL: CPT | Performed by: EMERGENCY MEDICINE

## 2023-03-31 PROCEDURE — 99233 SBSQ HOSP IP/OBS HIGH 50: CPT | Performed by: INTERNAL MEDICINE

## 2023-03-31 PROCEDURE — 83735 ASSAY OF MAGNESIUM: CPT | Performed by: EMERGENCY MEDICINE

## 2023-03-31 PROCEDURE — G0108 DIAB MANAGE TRN  PER INDIV: HCPCS

## 2023-03-31 PROCEDURE — 63710000001 INSULIN DETEMIR PER 5 UNITS: Performed by: INTERNAL MEDICINE

## 2023-03-31 RX ORDER — DONEPEZIL HYDROCHLORIDE 5 MG/1
5 TABLET, FILM COATED ORAL NIGHTLY
Status: DISCONTINUED | OUTPATIENT
Start: 2023-03-31 | End: 2023-04-04 | Stop reason: HOSPADM

## 2023-03-31 RX ORDER — TERAZOSIN 2 MG/1
2 CAPSULE ORAL NIGHTLY
Status: DISCONTINUED | OUTPATIENT
Start: 2023-03-31 | End: 2023-04-04 | Stop reason: HOSPADM

## 2023-03-31 RX ORDER — FENTANYL/ROPIVACAINE/NS/PF 2-625MCG/1
15 PLASTIC BAG, INJECTION (ML) EPIDURAL
Status: COMPLETED | OUTPATIENT
Start: 2023-03-31 | End: 2023-03-31

## 2023-03-31 RX ORDER — ATORVASTATIN CALCIUM 40 MG/1
80 TABLET, FILM COATED ORAL NIGHTLY
Status: DISCONTINUED | OUTPATIENT
Start: 2023-03-31 | End: 2023-04-04 | Stop reason: HOSPADM

## 2023-03-31 RX ORDER — INSULIN LISPRO 100 [IU]/ML
0-7 INJECTION, SOLUTION INTRAVENOUS; SUBCUTANEOUS
Status: DISCONTINUED | OUTPATIENT
Start: 2023-03-31 | End: 2023-04-04 | Stop reason: HOSPADM

## 2023-03-31 RX ORDER — NICOTINE POLACRILEX 4 MG
15 LOZENGE BUCCAL
Status: DISCONTINUED | OUTPATIENT
Start: 2023-03-31 | End: 2023-04-04 | Stop reason: HOSPADM

## 2023-03-31 RX ORDER — ACETAMINOPHEN 325 MG/1
650 TABLET ORAL EVERY 6 HOURS PRN
Status: DISCONTINUED | OUTPATIENT
Start: 2023-03-31 | End: 2023-04-04 | Stop reason: HOSPADM

## 2023-03-31 RX ORDER — CHOLECALCIFEROL (VITAMIN D3) 125 MCG
5 CAPSULE ORAL NIGHTLY PRN
Status: DISCONTINUED | OUTPATIENT
Start: 2023-03-31 | End: 2023-04-04 | Stop reason: HOSPADM

## 2023-03-31 RX ORDER — FLUOXETINE HYDROCHLORIDE 20 MG/1
20 CAPSULE ORAL DAILY
Status: DISCONTINUED | OUTPATIENT
Start: 2023-03-31 | End: 2023-04-04 | Stop reason: HOSPADM

## 2023-03-31 RX ORDER — PANTOPRAZOLE SODIUM 40 MG/1
40 TABLET, DELAYED RELEASE ORAL DAILY
Status: DISCONTINUED | OUTPATIENT
Start: 2023-04-01 | End: 2023-04-04 | Stop reason: HOSPADM

## 2023-03-31 RX ORDER — DEXTROSE MONOHYDRATE 25 G/50ML
25 INJECTION, SOLUTION INTRAVENOUS
Status: DISCONTINUED | OUTPATIENT
Start: 2023-03-31 | End: 2023-04-04 | Stop reason: HOSPADM

## 2023-03-31 RX ORDER — INSULIN LISPRO 100 [IU]/ML
5 INJECTION, SOLUTION INTRAVENOUS; SUBCUTANEOUS
Status: DISCONTINUED | OUTPATIENT
Start: 2023-03-31 | End: 2023-04-03

## 2023-03-31 RX ORDER — CARVEDILOL 6.25 MG/1
6.25 TABLET ORAL 2 TIMES DAILY WITH MEALS
Status: DISCONTINUED | OUTPATIENT
Start: 2023-03-31 | End: 2023-04-03

## 2023-03-31 RX ADMIN — RIVAROXABAN 20 MG: 20 TABLET, FILM COATED ORAL at 18:09

## 2023-03-31 RX ADMIN — TERAZOSIN HYDROCHLORIDE 2 MG: 2 CAPSULE ORAL at 20:24

## 2023-03-31 RX ADMIN — POTASSIUM PHOSPHATE, MONOBASIC POTASSIUM PHOSPHATE, DIBASIC 15 MMOL: 224; 236 INJECTION, SOLUTION, CONCENTRATE INTRAVENOUS at 06:28

## 2023-03-31 RX ADMIN — POTASSIUM CHLORIDE 10 MEQ: 7.46 INJECTION, SOLUTION INTRAVENOUS at 04:36

## 2023-03-31 RX ADMIN — ATORVASTATIN CALCIUM 80 MG: 40 TABLET, FILM COATED ORAL at 20:24

## 2023-03-31 RX ADMIN — Medication 10 ML: at 20:24

## 2023-03-31 RX ADMIN — FLUOXETINE 20 MG: 20 CAPSULE ORAL at 10:16

## 2023-03-31 RX ADMIN — PROMETHAZINE HYDROCHLORIDE 12.5 MG: 25 INJECTION INTRAMUSCULAR; INTRAVENOUS at 04:36

## 2023-03-31 RX ADMIN — PROMETHAZINE HYDROCHLORIDE 12.5 MG: 25 INJECTION INTRAMUSCULAR; INTRAVENOUS at 12:55

## 2023-03-31 RX ADMIN — CARVEDILOL 6.25 MG: 6.25 TABLET, FILM COATED ORAL at 08:23

## 2023-03-31 RX ADMIN — INSULIN DETEMIR 15 UNITS: 100 INJECTION, SOLUTION SUBCUTANEOUS at 10:15

## 2023-03-31 RX ADMIN — POTASSIUM CHLORIDE 10 MEQ: 7.46 INJECTION, SOLUTION INTRAVENOUS at 06:19

## 2023-03-31 RX ADMIN — Medication 10 ML: at 08:24

## 2023-03-31 RX ADMIN — ACETAMINOPHEN 325MG 650 MG: 325 TABLET ORAL at 15:41

## 2023-03-31 RX ADMIN — NICARDIPINE HYDROCHLORIDE 5 MG/HR: 25 INJECTION, SOLUTION INTRAVENOUS at 02:21

## 2023-03-31 RX ADMIN — POTASSIUM PHOSPHATE, MONOBASIC POTASSIUM PHOSPHATE, DIBASIC 15 MMOL: 224; 236 INJECTION, SOLUTION, CONCENTRATE INTRAVENOUS at 09:37

## 2023-03-31 RX ADMIN — MAGNESIUM SULFATE HEPTAHYDRATE 2 G: 2 INJECTION, SOLUTION INTRAVENOUS at 03:36

## 2023-03-31 RX ADMIN — MAGNESIUM SULFATE HEPTAHYDRATE 2 G: 2 INJECTION, SOLUTION INTRAVENOUS at 01:38

## 2023-03-31 RX ADMIN — CARVEDILOL 6.25 MG: 6.25 TABLET, FILM COATED ORAL at 18:09

## 2023-03-31 RX ADMIN — Medication 5 MG: at 20:24

## 2023-03-31 RX ADMIN — POTASSIUM CHLORIDE 10 MEQ: 7.46 INJECTION, SOLUTION INTRAVENOUS at 01:38

## 2023-03-31 RX ADMIN — POTASSIUM CHLORIDE 10 MEQ: 7.46 INJECTION, SOLUTION INTRAVENOUS at 03:36

## 2023-03-31 RX ADMIN — DONEPEZIL HYDROCHLORIDE 5 MG: 5 TABLET ORAL at 20:24

## 2023-03-31 RX ADMIN — PANTOPRAZOLE SODIUM 40 MG: 40 INJECTION, POWDER, LYOPHILIZED, FOR SOLUTION INTRAVENOUS at 08:23

## 2023-03-31 RX ADMIN — POTASSIUM CHLORIDE 10 MEQ: 7.46 INJECTION, SOLUTION INTRAVENOUS at 00:16

## 2023-03-31 RX ADMIN — POTASSIUM CHLORIDE, DEXTROSE MONOHYDRATE AND SODIUM CHLORIDE 150 ML/HR: 150; 5; 450 INJECTION, SOLUTION INTRAVENOUS at 06:19

## 2023-03-31 RX ADMIN — POTASSIUM PHOSPHATE, MONOBASIC POTASSIUM PHOSPHATE, DIBASIC 15 MMOL: 224; 236 INJECTION, SOLUTION, CONCENTRATE INTRAVENOUS at 15:10

## 2023-03-31 NOTE — CONSULTS
Diabetes Education  Assessment/Teaching    Patient Name:  Thelma Michael  YOB: 1960  MRN: [id number]  Admit Date:  3/30/2023    Met with Ms. Michael at the bedside for DM ed per DKA protocol.  Insulin pump is not in place at this time.  At home, she wears a tandem pump with rapid acting insulin and a dexcom g6.  She is established with [facility].  She has been having trouble with the needle piece on her pump.  Encouraged to notify her tandem rep Salma once she is d/c'd home.  Otherwise, she has been giving herself injections since she has been off the pump.  Target blood glucose readings and A1c goals per ADA were reviewed. Reviewed with patient current A1c of 8% and discussed its significance. Signs, symptoms, and treatment of hyperglycemia and hypoglycemia were discussed.  Encouraged pt to monitor blood sugar at home as ordered per day and to call endo if blood sugar is trending high. Encouraged to keep record of blood glucose readings to take to follow up appointment with endo.     Denies further needs/questions at this time.  Please call [phone] if needs arise prior to d/c.      Electronically signed by:  Amy Fields RN  03/31/23 15:28 EDT

## 2023-03-31 NOTE — PAYOR COMM NOTE
"Alisa Michael (62 y.o. Female)     Date of Birth   1960    Social Security Number       Address   [address] [place] [address]    Home Phone   [phone]    MRN   [id number]       Anglican   Mandaen    Marital Status                               Admission Date   3/30/23    Admission Type   Emergency    Admitting Provider   Rajat Griggs MD    Attending Provider   Rajat Griggs MD    Department, Room/Bed   [facility] 2A ICU, N205/1       Discharge Date       Discharge Disposition       Discharge Destination                               Attending Provider: Rajat Griggs MD    Allergies: No Known Allergies    Isolation: None   Infection: None   Code Status: Prior    Ht: 172.7 cm (68\")   Wt: 59.5 kg (131 lb 2.8 oz)    Admission Cmt: None   Principal Problem: DKA (diabetic ketoacidoses) [E11.10]                 Active Insurance as of 3/30/2023     Primary Coverage     Payor Plan Insurance Group Employer/Plan Group    [facility] MEDICAID [facility]     Payor Plan Address Payor Plan Phone Number Payor Plan Fax Number Effective Dates    [address] [address] [phone]  4/2/2020 - None Entered    [facility] [id number]       Subscriber Name Subscriber Birth Date Member ID       ALISA MICHAEL 1960 [id number]                 Emergency Contacts      (Rel.) Home Phone Work Phone Mobile Phone    VINAYAK PENN (Son) [phone] -- [phone]    EVELINE CANTRELL (Daughter) [phone] -- [phone]    MichaelRenea arriola (Daughter) -- -- [phone]    Priscila Cantrell (Friend) -- -- [phone]            [place]: NPI [id number] Tax ID [id number]  Insurance Information                [facility] MEDICAID Phone: [phone]    Subscriber: Alisa Michael Subscriber#: [id number]    Group#: [id number] Precert#: [id number]             History & Physical      Rajat Griggs MD at 03/30/23 1539          INTENSIVIST   INITIAL " HOSPITAL VISIT NOTE     Hospital:  LOS: 0 days     Ms. Thelma Michael, 62 y.o. female is seen for a Chief Complaint of:  Chief Complaint   Patient presents with   • Nausea     Pt presents via EMS with c/o nausea.        DKA (diabetic ketoacidoses)    Uncontrolled type 1 diabetes mellitus with hyperglycemia (HCC)    Intractable vomiting    Subjective    S     Thelma Michael is a 62 year-old female with PMH of T1DM, Afib on Xarelto, iron deficiency anemia, gastroparesis, hyperlipidemia, HTN, migraines, self-catheterizations of bladder, strokes (chronic right lentiform nucleus and left cerebellar lacunar infarcts found on MRI in February of 2023), tobacco abuse, and GERD that presented to [facility] on 3/3023 for nausea, vomiting, and abdominal pain for 48 hours. Her symptoms significantly worsened over the past 4 hours. She was concerned about gastroparesis, as she has experienced these symptoms with prior episodes. She has also noted that her blood sugars have been running in the 200s and her insulin pump has been turned off. EMS was called. Fingerstick glucose was checked which was in the 300s and she was brought to the ED for further evaluation.     In the ED, Glucose was 449 with beta hydroxy of 3.465. ABG is pending. Other labs showed lactate 6 and K 3.1. Also demonstrating an RAFAT with creatinine of 1.81. CT a/p showed redemonstrated abnormal circumferential wall thickening of the bladder, with a large superiorly projecting diverticulum noted. She has been hypertensive and Cardene gtt has been ordered.      Also demonstrated some concerns for GIB with coffee ground emesis noted by staff; she has a positive occult. She has also received Zofran, Protonix, and promethazine in the ED and started on the DKA protocol. She will be admitted to the ICU for further work-up and care.     Of note, patient was recently admitted to [facility] in February for dehydration 2* nausea and vomiting, previously diagnosed with gastric  paresis, and has had a gastric stimulator placed since.      Time spent: 10 minutes  Electronically signed by HOWIE Ambrosio, 03/30/23, 4:09 PM EDT.     PMH: She  has a past medical history of Acid reflux, Acute bronchitis, Cardiac murmur, Diabetes mellitus (HCC), H/O echocardiogram (08/07/2012), History of nuclear stress test (08/22/2014), Hyperlipidemia, Hypertension, Impacted cerumen of both ears, Migraine, Self-catheterizes urinary bladder, Sinusitis, Stroke (HCC), Tobacco abuse, and Urticaria.   PSxH: She  has a past surgical history that includes Dental surgery; Colonoscopy; Esophagogastroduodenoscopy (N/A, 06/30/2021); Colonoscopy (N/A, 07/27/2021); Esophagogastroduodenoscopy (N/A, 07/27/2021); Capsule Endoscopy (07/27/2021); endoscopy with jtube (N/A, 03/16/2022); and Upper gastrointestinal endoscopy.      Medications:  No current facility-administered medications on file prior to encounter.     Current Outpatient Medications on File Prior to Encounter   Medication Sig   • acetaminophen (TYLENOL) 325 MG tablet Take 2 tablets by mouth Every 4 (Four) Hours As Needed for Mild Pain .   • albuterol sulfate  (90 Base) MCG/ACT inhaler Inhale 2 puffs Every 4 (Four) Hours As Needed for Wheezing.   • Ascorbic Acid (VITAMIN C PO) Vitamin C TABS   • atorvastatin (LIPITOR) 80 MG tablet Take 1 tablet by mouth Every Night.   • calcium carbonate (TUMS) 500 MG chewable tablet Chew 1,000 mg 3 (Three) Times a Day As Needed for Indigestion or Heartburn.   • carvedilol (COREG) 6.25 MG tablet Take 1 tablet by mouth 2 (Two) Times a Day With Meals for 30 days.   • Continuous Blood Gluc Sensor (Dexcom G6 Sensor) USE ONE SENSOR EVERY 10 DAYS   • Continuous Blood Gluc Sensor (Dexcom G6 Sensor) Every 10 (Ten) Days.   • Continuous Blood Gluc Transmit (Dexcom G6 Transmitter) misc 1 each Every 3 (Three) Months.   • diphenhydrAMINE (BENADRYL) 25 MG tablet Take 1 tablet by mouth Every 6 (Six) Hours As Needed for Itching.    • donepezil (Aricept) 5 MG tablet Take 1 tablet by mouth Every Night.   • doxazosin (CARDURA) 2 MG tablet Take 1 tablet by mouth Every Night.   • estradiol (ESTRACE VAGINAL) 0.1 MG/GM vaginal cream Insert 1 gm intravaginally twice weekly at bedtime.   • ferrous sulfate 325 (65 Fe) MG tablet TAKE 1 TABLET BY MOUTH ONCE DAILY WITH  BREAKFAST  **TAKE  WITH  ORANGE  JUICE  OR  VITAMIN  C**   • FLUoxetine (PROzac) 20 MG capsule Take 1 capsule by mouth Daily.   • glucose monitor monitoring kit 1 each 4 (Four) Times a Day.   • melatonin 5 MG tablet tablet Take 1 tablet by mouth At Night As Needed (sleep).   • mirtazapine (REMERON) 7.5 MG tablet Take 1 tablet by mouth Every Night.   • Multivitamin tablet tablet Take 1 tablet by mouth once daily   • pantoprazole (PROTONIX) 40 MG EC tablet Take 1 tablet by mouth Daily.   • rivaroxaban (XARELTO) 20 MG tablet Take 1 tablet by mouth Daily With Dinner.   • sennosides-docusate (senna-docusate sodium) 8.6-50 MG per tablet Take 2 tablets by mouth 2 (Two) Times a Day As Needed for Constipation.   • traMADol (ULTRAM) 50 MG tablet Take 1 tablet by mouth Every 6 (Six) Hours As Needed for Moderate Pain.   • vitamin D (ERGOCALCIFEROL) 1.25 MG (25467 UT) capsule capsule Take 1 capsule by mouth 1 (One) Time Per Week.   • [DISCONTINUED] Insulin Glargine (BASAGLAR KWIKPEN) 100 UNIT/ML injection pen Inject 10 Units under the skin into the appropriate area as directed Every Night.     Allergies: She has No Known Allergies.   FH: Her family history includes ADD / ADHD in an other family member; Anxiety disorder in an other family member; Arthritis in an other family member; Depression in an other family member; Diabetes in her brother and another family member; Heart disease in an other family member; Hyperlipidemia in her mother and another family member; Hypertension in her brother, mother, and another family member; Lung cancer in an other family member; Osteoporosis in an other family  "member.   SH: She  reports that she quit smoking about 3 years ago. Her smoking use included cigarettes. She has a 7.50 pack-year smoking history. She has never used smokeless tobacco. She reports current alcohol use of about 1.0 standard drink per week. She reports that she does not use drugs.     The patient's relevant past medical, surgical and social history were reviewed and updated in Epic as appropriate.     ROS (14): (+) abdominal pain, nausea, vomiting, HA    Objective    O     Vitals  Temp  Min: 97.3 °F (36.3 °C)  Max: 97.3 °F (36.3 °C)  BP  Min: 147/111  Max: 242/128  Pulse  Min: 93  Max: 111  Resp  Min: 22  Max: 24  SpO2  Min: 85 %  Max: 100 % No data recorded     Medications (drips):  dextrose 5 % and sodium chloride 0.45 %  dextrose 5 % and sodium chloride 0.45 % with KCl 20 mEq/L  dextrose 5 % and sodium chloride 0.45 % with KCl 40 mEq/L  dextrose 5 % and sodium chloride 0.9 %  dextrose 5 % and sodium chloride 0.9 % with KCl 20 mEq  dextrose 5% and sodium chloride 0.9% with KCl 40 mEq/L  insulin  niCARdipine  custom IV KCl infusion builder  sodium chloride  sodium chloride 0.45 % with KCl 20 mEq  sodium chloride  sodium chloride  sodium chloride 0.9 % with KCl 20 mEq  sodium chloride 0.9 % with KCl 40 mEq/L    Physical Examination  Vital Signs: Blood pressure 155/72, pulse 105, temperature 97.3 °F (36.3 °C), temperature source Oral, resp. rate 16, height 172.7 cm (68\"), weight 63.5 kg (140 lb), SpO2 100 %, not currently breastfeeding.    General: The patient appears in moderate distress.  Hypertensive, tachycardic.   ENT/Neck: Trachea midline, No masses, No JVD.  Dry appearing mucous membranes.  Chest: Clear to auscultation bilaterally, No wheezing, rhonchi, or rales. Normal work of breathing. Equal chest rise.  Cardiac: Regular rhythm, tachycardic. S1S2 auscultated. No murmurs, rubs or gallops.   Abdomen: Soft, non-distended.diffusely tender without localization.  No guarding.  No rebound.  Positive " bowel sounds in all four quadrants.   Extremities: No lower extremity edema. No clubbing or cyanosis.  Skin: No rashes, open wounds, or bruising. Warm, dry, well-perfused.  Neuro: Motor power grossly intact bilaterally. Speech fluid and fluent. Thought process coherent.  Psych: Alert and oriented x 3, Mood stable.    Lines, Drains & Airways     Active LDAs     Name Placement date Placement time Site Days    Peripheral IV 03/30/23 1231 Anterior;Left Hand 03/30/23  1231  Hand  less than 1    Insulin Pump 07/03/22  0800  -- 270              Results from last 7 days   Lab Units 03/30/23  1233   WBC 10*3/mm3 8.79   HEMOGLOBIN g/dL 13.3   MCV fL 86.8   PLATELETS 10*3/mm3 405     Results from last 7 days   Lab Units 03/30/23  1316   SODIUM mmol/L 142   POTASSIUM mmol/L 3.1*   CO2 mmol/L 17.0*   CREATININE mg/dL 1.81*     Estimated Creatinine Clearance: 31.5 mL/min (A) (by C-G formula based on SCr of 1.81 mg/dL (H)).  Results from last 7 days   Lab Units 03/30/23  1316   ALK PHOS U/L 109   BILIRUBIN mg/dL 0.7   ALT (SGPT) U/L 10   AST (SGOT) U/L 16     CT Abdomen/Pelvis (3/30/2023):   Radiology Impression:   - Stable CT appearance of the abdomen and pelvis as above, including circumferential wall thickening of the urinary bladder with a large superior projecting diverticulum present. There is no evidence of obstructive nephropathy. Colonic diverticulosis   changes are present without evidence of diverticulitis.  - Postoperative changes are present from apparent interval stimulator placement, with generator along the right lower abdomen and leads visualized terminating along the stomach.    I reviewed the patient's new laboratory and imaging results.  I independently reviewed the patient's new images.    Assessment & Plan   A / P     Active Hospital Problems    Diagnosis    • **DKA (diabetic ketoacidoses)    • Intractable vomiting    • Uncontrolled type 1 diabetes mellitus with hyperglycemia (HCC)      #DKA  #Intractable  vomiting  #Elevated lactate  -Patient with anion gap metabolic acidosis, respiratory alkalosis  -Lactate elevated; trending down after resuscitation   -DKA likely precipitated by GI illness.  Elevated serum beta hydroxybutyrate.  Placed on DKA protocol following resuscitation with 2L of normal saline in the emergency department  -Zofran as needed  -A1c 8.0 on admission     #Hematemesis  -Per report but no active events since admission.  Started PPI twice daily.  Will follow H/H every 8 hours; initial stable at 13.3/38  -Diverticulosis without diverticulitis on CT abdomen/pelvis (3/30)    #Hypertensive urgency  -Initiated nicardipine drip    #RAFAT  #Hypokalemia  -Etiology likely prerenal in the setting of volume loss  -Replacing electrolytes per DKA protocol  -UA pending    F-NPO  A-NA  S-NA  T-SCDs  H-NA  U-PPI BID  G-DKA protocol   S-NA  B-Last bowel movement  I-PIV  D-NA    -- Yovani Griggs MD  Pulmonary/Critical Care    Critical Care time spent in direct patient care: 30 minutes (excluding procedure time, if applicable) including high complexity decision making to assess, manipulate, and support vital organ system failure in this individual who has impairment of one or more vital organ systems such that there is a high probability of imminent or life threatening deterioration in the patient’s condition.      Electronically signed by Rajat Griggs MD at 03/30/23 1858       Emergency Department Notes    No notes of this type exist for this encounter.           Current Facility-Administered Medications   Medication Dose Route Frequency Provider Last Rate Last Admin   • carvedilol (COREG) tablet 6.25 mg  6.25 mg Oral BID With Meals Marlene Greenberg MD   6.25 mg at 03/31/23 0823   • dextrose (D50W) (25 g/50 mL) IV injection 10-50 mL  10-50 mL Intravenous Q15 Min PRN Thom Chaves MD       • dextrose (GLUTOSE) oral gel 15 g  15 g Oral Q15 Min PRN Thom Chaves MD       • dextrose 5 % and sodium chloride  0.45 % infusion  150 mL/hr Intravenous Continuous PRN Thom Chaves MD       • dextrose 5 % and sodium chloride 0.45 % with KCl 20 mEq/L infusion  150 mL/hr Intravenous Continuous PRN Thom Chaves  mL/hr at 03/31/23 0619 150 mL/hr at 03/31/23 0619   • dextrose 5 % and sodium chloride 0.45 % with KCl 40 mEq/L infusion  150 mL/hr Intravenous Continuous PRN Thom Chaves  mL/hr at 03/30/23 2302 150 mL/hr at 03/30/23 2302   • dextrose 5 % and sodium chloride 0.9 % infusion  150 mL/hr Intravenous Continuous PRN Thom Chaves MD       • dextrose 5 % and sodium chloride 0.9 % with KCl 20 mEq/L infusion  150 mL/hr Intravenous Continuous PRN Thom Chaves MD       • dextrose 5 % and sodium chloride 0.9 % with KCl 40 mEq/L infusion  150 mL/hr Intravenous Continuous PRN Thom Chaves MD       • glucagon (GLUCAGEN) injection 1 mg  1 mg Intramuscular Q15 Min PRN Thom Chaves MD       • insulin regular 1 unit/mL in 0.9% sodium chloride (Glucommander)  0-100 Units/hr Intravenous Titrated Thom Chaves MD 2.3 mL/hr at 03/31/23 0818 2.3 Units/hr at 03/31/23 0818   • Magnesium Standard Dose Replacement - Follow Nurse / BPA Driven Protocol   Does not apply PRN Thom Chaves MD       • niCARdipine (CARDENE) 25mg in 250mL NS infusion  5-15 mg/hr Intravenous Titrated Thom Chaves MD   Held at 03/31/23 0645   • pantoprazole (PROTONIX) injection 40 mg  40 mg Intravenous Q12H Milton Bonilla, APRN   40 mg at 03/31/23 0823   • Phosphorus Replacement - Follow Nurse / BPA Driven Protocol   Does not apply PRN Thom Chaves MD       • potassium phosphate 15 mmol in 0.9% normal saline 250 mL IV  15 mmol Intravenous Q3H Shanna Baker, APRN   15 mmol at 03/31/23 0628   • Potassium Replacement - Follow Nurse / BPA Driven Protocol   Does not apply PRN Thom Chaves MD       • promethazine (PHENERGAN) 12.5 mg in sodium chloride 0.9 % 50 mL  12.5 mg Intravenous Q6H PRN Thom Chaves MD 0 mL/hr  at 03/30/23 1540 12.5 mg at 03/31/23 0436   • sodium chloride 0.9 % flush 10 mL  10 mL Intravenous PRN Thom Chaves MD       • sodium chloride 0.9 % flush 10 mL  10 mL Intravenous Q12H Thom Chaves MD   10 mL at 03/31/23 0824   • sodium chloride 0.9 % infusion 40 mL  40 mL Intravenous PRN Thom Chaves MD           Lab Results (last 24 hours)     Procedure Component Value Units Date/Time    POC Glucose Once [[id number]]  (Abnormal) Collected: 03/31/23 0813    Specimen: Blood Updated: 03/31/23 0814     Glucose 177 mg/dL      Comment: Meter: [id number] : [id number] Wild Stroud       POC Glucose Once [[id number]]  (Abnormal) Collected: 03/31/23 0719    Specimen: Blood Updated: 03/31/23 0721     Glucose 164 mg/dL      Comment: Meter: [id number] : [id number] Cody Chang       POC Glucose Once [[id number]]  (Normal) Collected: 03/31/23 0558    Specimen: Blood Updated: 03/31/23 0559     Glucose 119 mg/dL      Comment: Meter: [id number] : [id number] Isidro Hoover       Comprehensive Metabolic Panel [[id number]]  (Abnormal) Collected: 03/31/23 0452    Specimen: Blood from Arm, Right Updated: 03/31/23 0539     Glucose 112 mg/dL      BUN 19 mg/dL      Creatinine 1.17 mg/dL      Sodium 144 mmol/L      Potassium 3.8 mmol/L      Chloride 114 mmol/L      CO2 20.0 mmol/L      Calcium 8.7 mg/dL      Total Protein 6.3 g/dL      Albumin 3.7 g/dL      ALT (SGPT) 7 U/L      AST (SGOT) 17 U/L      Alkaline Phosphatase 82 U/L      Total Bilirubin 0.5 mg/dL      Globulin 2.6 gm/dL      Comment: Calculated Result        A/G Ratio 1.4 g/dL      BUN/Creatinine Ratio 16.2     Anion Gap 10.0 mmol/L      eGFR 52.2 mL/min/1.73     Narrative:      GFR Normal >60  Chronic Kidney Disease <60  Kidney Failure <15      Basic Metabolic Panel [[id number]]  (Abnormal) Collected: 03/31/23 0452    Specimen: Blood from Arm, Right Updated: 03/31/23 0538     Glucose 112 mg/dL      BUN 19 mg/dL      Creatinine 1.17 mg/dL      Sodium 144  mmol/L      Potassium 3.8 mmol/L      Chloride 114 mmol/L      CO2 20.0 mmol/L      Calcium 8.7 mg/dL      BUN/Creatinine Ratio 16.2     Anion Gap 10.0 mmol/L      eGFR 52.2 mL/min/1.73     Narrative:      GFR Normal >60  Chronic Kidney Disease <60  Kidney Failure <15      Magnesium [[id number]]  (Abnormal) Collected: 03/31/23 0452    Specimen: Blood from Arm, Right Updated: 03/31/23 0538     Magnesium 3.2 mg/dL     Phosphorus [[id number]]  (Abnormal) Collected: 03/31/23 0452    Specimen: Blood from Arm, Right Updated: 03/31/23 0537     Phosphorus 0.7 mg/dL     STAT Lactic Acid, Reflex [[id number]]  (Normal) Collected: 03/31/23 0452    Specimen: Blood from Arm, Right Updated: 03/31/23 0529     Lactate 1.2 mmol/L      Comment: Falsely depressed results may occur on samples drawn from patients receiving N-Acetylcysteine (NAC) or Metamizole.       CBC & Differential [[id number]]  (Abnormal) Collected: 03/31/23 0452    Specimen: Blood from Arm, Right Updated: 03/31/23 0515    Narrative:      The following orders were created for panel order CBC & Differential.  Procedure                               Abnormality         Status                     ---------                               -----------         ------                     CBC Auto Differential[[id number]]        Abnormal            Final result                 Please view results for these tests on the individual orders.    CBC Auto Differential [[id number]]  (Abnormal) Collected: 03/31/23 0452    Specimen: Blood from Arm, Right Updated: 03/31/23 0515     WBC 12.52 10*3/mm3      RBC 3.48 10*6/mm3      Hemoglobin 10.4 g/dL      Hematocrit 30.6 %      MCV 87.9 fL      MCH 29.9 pg      MCHC 34.0 g/dL      RDW 13.0 %      RDW-SD 41.1 fl      MPV 10.3 fL      Platelets 325 10*3/mm3      Neutrophil % 80.2 %      Lymphocyte % 11.4 %      Monocyte % 7.7 %      Eosinophil % 0.0 %      Basophil % 0.1 %      Immature Grans % 0.6 %      Neutrophils, Absolute 10.04 10*3/mm3       Lymphocytes, Absolute 1.43 10*3/mm3      Monocytes, Absolute 0.97 10*3/mm3      Eosinophils, Absolute 0.00 10*3/mm3      Basophils, Absolute 0.01 10*3/mm3      Immature Grans, Absolute 0.07 10*3/mm3      nRBC 0.0 /100 WBC     POC Glucose Once [[id number]]  (Normal) Collected: 03/31/23 0445    Specimen: Blood Updated: 03/31/23 0457     Glucose 115 mg/dL      Comment: Meter: [id number] : [id number] [employer]ovation       POC Glucose Once [[id number]]  (Normal) Collected: 03/31/23 0357    Specimen: Blood Updated: 03/31/23 0358     Glucose 106 mg/dL      Comment: Meter: [id number] : [id number] XGIMI       POC Glucose Once [[id number]]  (Normal) Collected: 03/31/23 0334    Specimen: Blood Updated: 03/31/23 0336     Glucose 102 mg/dL      Comment: Meter: [id number] : [id number] [employer]       POC Glucose Once [[id number]]  (Abnormal) Collected: 03/31/23 0218    Specimen: Blood Updated: 03/31/23 0220     Glucose 138 mg/dL      Comment: Meter: [id number] : [id number] [employer]       POC Glucose Once [[id number]]  (Abnormal) Collected: 03/31/23 0104    Specimen: Blood Updated: 03/31/23 0108     Glucose 164 mg/dL      Comment: Meter: [id number] : [id number] Eden Blackman       POC Glucose Once [[id number]]  (Abnormal) Collected: 03/30/23 2355    Specimen: Blood Updated: 03/30/23 2356     Glucose 226 mg/dL      Comment: Meter: [id number] : [id number] XGIMI       POC Glucose Once [[id number]]  (Abnormal) Collected: 03/30/23 2327    Specimen: Blood Updated: 03/30/23 2328     Glucose 222 mg/dL      Comment: Meter: [id number] : [id number] [employer]       Phosphorus [[id number]]  (Abnormal) Collected: 03/30/23 2208    Specimen: Blood from Arm, Right Updated: 03/30/23 2245     Phosphorus 1.2 mg/dL     STAT Lactic Acid, Reflex [[id number]]  (Abnormal) Collected: 03/30/23 2208    Specimen: Blood from Arm, Right Updated: 03/30/23 2245     Lactate 2.7 mmol/L      Comment: Falsely  depressed results may occur on samples drawn from patients receiving N-Acetylcysteine (NAC) or Metamizole.       Basic Metabolic Panel [[id number]]  (Abnormal) Collected: 03/30/23 2208    Specimen: Blood from Arm, Right Updated: 03/30/23 2243     Glucose 216 mg/dL      BUN 26 mg/dL      Creatinine 1.29 mg/dL      Sodium 144 mmol/L      Potassium 2.9 mmol/L      Comment: Slight hemolysis detected by analyzer. Results may be affected.        Chloride 108 mmol/L      CO2 17.0 mmol/L      Calcium 8.8 mg/dL      BUN/Creatinine Ratio 20.2     Anion Gap 19.0 mmol/L      eGFR 46.4 mL/min/1.73     Narrative:      GFR Normal >60  Chronic Kidney Disease <60  Kidney Failure <15      Magnesium [[id number]]  (Abnormal) Collected: 03/30/23 2208    Specimen: Blood from Arm, Right Updated: 03/30/23 2243     Magnesium 1.4 mg/dL     POC Glucose Once [[id number]]  (Abnormal) Collected: 03/30/23 2207    Specimen: Blood Updated: 03/30/23 2208     Glucose 205 mg/dL      Comment: Meter: [id number] : [id number] Ruy Sydnee S       POC Glucose Once [[id number]]  (Abnormal) Collected: 03/30/23 2103    Specimen: Blood Updated: 03/30/23 2105     Glucose 188 mg/dL      Comment: Meter: [id number] : [id number] Essie Orlando       Phosphorus [[id number]]  (Abnormal) Collected: 03/30/23 1957    Specimen: Blood Updated: 03/30/23 2033     Phosphorus 1.5 mg/dL     Basic Metabolic Panel [[id number]]  (Abnormal) Collected: 03/30/23 1957    Specimen: Blood Updated: 03/30/23 2032     Glucose 209 mg/dL      BUN 26 mg/dL      Creatinine 1.36 mg/dL      Sodium 150 mmol/L      Potassium 2.9 mmol/L      Comment: Slight hemolysis detected by analyzer. Results may be affected.        Chloride 114 mmol/L      CO2 21.0 mmol/L      Calcium 8.7 mg/dL      BUN/Creatinine Ratio 19.1     Anion Gap 15.0 mmol/L      eGFR 43.6 mL/min/1.73     Narrative:      GFR Normal >60  Chronic Kidney Disease <60  Kidney Failure <15      Magnesium [[id number]]  (Abnormal) Collected:  03/30/23 1957    Specimen: Blood Updated: 03/30/23 2030     Magnesium 1.5 mg/dL     STAT Lactic Acid, Reflex [[id number]]  (Abnormal) Collected: 03/30/23 1957    Specimen: Blood Updated: 03/30/23 2030     Lactate 4.0 mmol/L      Comment: Falsely depressed results may occur on samples drawn from patients receiving N-Acetylcysteine (NAC) or Metamizole.       Hemoglobin & Hematocrit, Blood [[id number]]  (Abnormal) Collected: 03/30/23 1957    Specimen: Blood Updated: 03/30/23 2011     Hemoglobin 11.3 g/dL      Hematocrit 32.8 %     POC Glucose Once [[id number]]  (Abnormal) Collected: 03/30/23 2007    Specimen: Blood Updated: 03/30/23 2009     Glucose 181 mg/dL      Comment: Meter: [id number] : [id number] Alfreda Lee       Urinalysis, Microscopic Only - Urine, Clean Catch [[id number]] Collected: 03/30/23 1827    Specimen: Urine, Clean Catch Updated: 03/30/23 1848     RBC, UA 0-2 /HPF      WBC, UA 0-2 /HPF      Comment: Urine culture not indicated.        Bacteria, UA None Seen /HPF      Squamous Epithelial Cells, UA 0-2 /HPF      Hyaline Casts, UA 0-6 /LPF      Methodology Automated Microscopy    Urinalysis With Culture If Indicated - Urine, Clean Catch [[id number]]  (Abnormal) Collected: 03/30/23 1827    Specimen: Urine, Clean Catch Updated: 03/30/23 1848     Color, UA Yellow     Appearance, UA Clear     pH, UA 6.5     Specific Gravity, UA 1.015     Glucose,  mg/dL (2+)     Ketones, UA 15 mg/dL (1+)     Bilirubin, UA Negative     Blood, UA Trace     Protein,  mg/dL (2+)     Leuk Esterase, UA Negative     Nitrite, UA Negative     Urobilinogen, UA 0.2 E.U./dL    Narrative:      In absence of clinical symptoms, the presence of pyuria, bacteria, and/or nitrites on the urinalysis result does not correlate with infection.    POC Glucose Once [[id number]]  (Abnormal) Collected: 03/30/23 1832    Specimen: Blood Updated: 03/30/23 1834     Glucose 403 mg/dL      Comment: Physician Notified Meter: [id number] :  [id number] April Dominguez       POC Glucose Once [[id number]]  (Abnormal) Collected: 03/30/23 1739    Specimen: Blood Updated: 03/30/23 1748     Glucose 428 mg/dL      Comment: Result Not Confirmed Meter: [id number] : [id number] Sherwin Otero       [place] Draw [[id number]] Collected: 03/30/23 1233    Specimen: Blood Updated: 03/30/23 1731    Narrative:      The following orders were created for panel order [place] Draw.  Procedure                               Abnormality         Status                     ---------                               -----------         ------                     Green Top (Gel)[[id number]]                                  Final result               Lavender Top[[id number]]                                     Final result               Gold Top - SST[[id number]]                                   Final result               Pardo Top[[id number]]                                         Final result               Light Blue Top[[id number]]                                   Final result                 Please view results for these tests on the individual orders.    Gray Top [[id number]] Collected: 03/30/23 1316    Specimen: Blood Updated: 03/30/23 1731     Extra Tube Hold for add-ons.     Comment: Auto resulted.       Basic Metabolic Panel [[id number]]  (Abnormal) Collected: 03/30/23 1617    Specimen: Blood Updated: 03/30/23 1709     Glucose 451 mg/dL      BUN 30 mg/dL      Creatinine 1.46 mg/dL      Sodium 143 mmol/L      Potassium 3.1 mmol/L      Comment: Slight hemolysis detected by analyzer. Results may be affected.        Chloride 103 mmol/L      CO2 17.0 mmol/L      Calcium 9.5 mg/dL      BUN/Creatinine Ratio 20.5     Anion Gap 23.0 mmol/L      eGFR 40.0 mL/min/1.73     Narrative:      GFR Normal >60  Chronic Kidney Disease <60  Kidney Failure <15      Magnesium [[id number]]  (Normal) Collected: 03/30/23 1617    Specimen: Blood Updated: 03/30/23 1703     Magnesium 1.7 mg/dL     Phosphorus  [[id number]]  (Abnormal) Collected: 03/30/23 1617    Specimen: Blood Updated: 03/30/23 1703     Phosphorus 2.4 mg/dL     STAT Lactic Acid, Reflex [[id number]]  (Abnormal) Collected: 03/30/23 1617    Specimen: Blood Updated: 03/30/23 1702     Lactate 4.3 mmol/L      Comment: Falsely depressed results may occur on samples drawn from patients receiving N-Acetylcysteine (NAC) or Metamizole.       High Sensitivity Troponin T 2Hr [[id number]]  (Abnormal) Collected: 03/30/23 1617    Specimen: Blood Updated: 03/30/23 1701     HS Troponin T 23 ng/L      Troponin T Delta -8 ng/L     Narrative:      High Sensitive Troponin T Reference Range:  <10.0 ng/L- Negative Female for AMI  <15.0 ng/L- Negative Male for AMI  >=10 - Abnormal Female indicating possible myocardial injury.  >=15 - Abnormal Male indicating possible myocardial injury.   Clinicians would have to utilize clinical acumen, EKG, Troponin, and serial changes to determine if it is an Acute Myocardial Infarction or myocardial injury due to an underlying chronic condition.         Blood Gas, Arterial With Co-Ox [[id number]]  (Abnormal) Collected: 03/30/23 1632    Specimen: Arterial Blood Updated: 03/30/23 1632     Site Right Radial     Sherwin's Test N/A     pH, Arterial 7.451 pH units      Comment: 83 Value above reference range        pCO2, Arterial 24.6 mm Hg      Comment: 84 Value below reference range        pO2, Arterial 112.0 mm Hg      Comment: 83 Value above reference range        HCO3, Arterial 17.1 mmol/L      Base Excess, Arterial -5.3 mmol/L      Hemoglobin, Blood Gas 12.4 g/dL      Hematocrit, Blood Gas 37.9 %      Oxyhemoglobin 98.0 %      Methemoglobin 0.30 %      Carboxyhemoglobin 1.0 %      CO2 Content 17.9 mmol/L      Temperature 37.0 C      Barometric Pressure for Blood Gas --     Comment: N/A        Modality Room Air     FIO2 21 %      Rate 0 Breaths/minute      PIP 0 cmH2O      Comment: Meter: [id number]     :  [id number]        IPAP 0      EPAP 0     Note --     pH, Temp Corrected 7.451 pH Units      pCO2, Temperature Corrected 24.6 mm Hg      pO2, Temperature Corrected 112 mm Hg     Hemoglobin A1c [[id number]]  (Abnormal) Collected: 03/30/23 1233    Specimen: Blood Updated: 03/30/23 1605     Hemoglobin A1C 8.00 %     Narrative:      Hemoglobin A1C Ranges:    Increased Risk for Diabetes  5.7% to 6.4%  Diabetes                     >= 6.5%  Diabetic Goal                < 7.0%    High Sensitivity Troponin T [[id number]]  (Abnormal) Collected: 03/30/23 1316    Specimen: Blood Updated: 03/30/23 1558     HS Troponin T 31 ng/L     Narrative:      High Sensitive Troponin T Reference Range:  <10.0 ng/L- Negative Female for AMI  <15.0 ng/L- Negative Male for AMI  >=10 - Abnormal Female indicating possible myocardial injury.  >=15 - Abnormal Male indicating possible myocardial injury.   Clinicians would have to utilize clinical acumen, EKG, Troponin, and serial changes to determine if it is an Acute Myocardial Infarction or myocardial injury due to an underlying chronic condition.         Phosphorus [[id number]]  (Normal) Collected: 03/30/23 1316    Specimen: Blood Updated: 03/30/23 1551     Phosphorus 2.5 mg/dL     Magnesium [[id number]]  (Normal) Collected: 03/30/23 1316    Specimen: Blood Updated: 03/30/23 1551     Magnesium 2.1 mg/dL     Osmolality, Serum [[id number]]  (Abnormal) Collected: 03/30/23 1316    Specimen: Blood Updated: 03/30/23 1546     Osmolality 328 mOsm/kg     Ketone Bodies, Serum (Not performed at [facility]) [[id number]]  (Abnormal) Collected: 03/30/23 1316    Specimen: Blood Updated: 03/30/23 1522    Narrative:      The following orders were created for panel order Ketone Bodies, Serum (Not performed at [facility]).  Procedure                               Abnormality         Status                     ---------                               -----------         ------                     Beta Hydroxybutyrate Eran...[[id number]]  Abnormal             Final result                 Please view results for these tests on the individual orders.    Beta Hydroxybutyrate Quantitative [[id number]]  (Abnormal) Collected: 03/30/23 1316    Specimen: Blood Updated: 03/30/23 1522     Beta-Hydroxybutyrate Quant 3.465 mmol/L     Narrative:      In the assessment of possible diabetic ketoacidosis, the test should be interpreted along with other clinical and laboratory findings.  A level greater than 1 mmol/L should require further evaluation and levels of more than 3 mmol/L require immediate medical review.    Gold Top - SST [[id number]] Collected: 03/30/23 1316    Specimen: Blood Updated: 03/30/23 1430     Extra Tube Hold for add-ons.     Comment: Auto resulted.       Light Blue Top [[id number]] Collected: 03/30/23 1316    Specimen: Blood Updated: 03/30/23 1430     Extra Tube Hold for add-ons.     Comment: Auto resulted       Green Top (Gel) [[id number]] Collected: 03/30/23 1316    Specimen: Blood Updated: 03/30/23 1430     Extra Tube Hold for add-ons.     Comment: Auto resulted.       Comprehensive Metabolic Panel [[id number]]  (Abnormal) Collected: 03/30/23 1316    Specimen: Blood Updated: 03/30/23 1401     Glucose 449 mg/dL      BUN 29 mg/dL      Creatinine 1.81 mg/dL      Sodium 142 mmol/L      Potassium 3.1 mmol/L      Chloride 97 mmol/L      CO2 17.0 mmol/L      Calcium 9.9 mg/dL      Total Protein 7.7 g/dL      Albumin 4.3 g/dL      ALT (SGPT) 10 U/L      AST (SGOT) 16 U/L      Alkaline Phosphatase 109 U/L      Total Bilirubin 0.7 mg/dL      Globulin 3.4 gm/dL      Comment: Calculated Result        A/G Ratio 1.3 g/dL      BUN/Creatinine Ratio 16.0     Anion Gap 28.0 mmol/L      eGFR 30.9 mL/min/1.73     Narrative:      GFR Normal >60  Chronic Kidney Disease <60  Kidney Failure <15      Lipase [[id number]]  (Normal) Collected: 03/30/23 1316    Specimen: Blood Updated: 03/30/23 1347     Lipase 22 U/L     Lactic Acid, Plasma [[id number]]  (Abnormal) Collected: 03/30/23 1316     Specimen: Blood Updated: 03/30/23 1347     Lactate 6.0 mmol/L      Comment: Falsely depressed results may occur on samples drawn from patients receiving N-Acetylcysteine (NAC) or Metamizole.       Lavender Top [[id number]] Collected: 03/30/23 1233    Specimen: Blood Updated: 03/30/23 1345     Extra Tube hold for add-on     Comment: Auto resulted       Occult Blood Gastric / Duodenum - Gastric Contents, [[id number]]  (Abnormal) Collected: 03/30/23 1306    Specimen: Gastric Contents Updated: 03/30/23 1331     Gastroccult Positive    POC Occult Blood Stool [[id number]]  (Normal) Resulted: 03/30/23 1310    Specimen: Stool from Per Rectum Updated: 03/30/23 1311     Fecal Occult Blood Negative     Lot Number [id number]     Expiration Date 10/25     DEVELOPER LOT NUMBER [id number]     DEVELOPER EXPIRATION DATE 5/26     Positive Control Positive     Negative Control Negative    CBC & Differential [[id number]]  (Abnormal) Collected: 03/30/23 1233    Specimen: Blood Updated: 03/30/23 1248    Narrative:      The following orders were created for panel order CBC & Differential.  Procedure                               Abnormality         Status                     ---------                               -----------         ------                     CBC Auto Differential[[id number]]        Abnormal            Final result                 Please view results for these tests on the individual orders.    CBC Auto Differential [[id number]]  (Abnormal) Collected: 03/30/23 1233    Specimen: Blood Updated: 03/30/23 1248     WBC 8.79 10*3/mm3      RBC 4.41 10*6/mm3      Hemoglobin 13.3 g/dL      Hematocrit 38.3 %      MCV 86.8 fL      MCH 30.2 pg      MCHC 34.7 g/dL      RDW 13.0 %      RDW-SD 41.0 fl      MPV 10.5 fL      Platelets 405 10*3/mm3      Neutrophil % 77.4 %      Lymphocyte % 17.5 %      Monocyte % 4.4 %      Eosinophil % 0.0 %      Basophil % 0.2 %      Immature Grans % 0.5 %      Neutrophils, Absolute 6.80 10*3/mm3      Lymphocytes,  Absolute 1.54 10*3/mm3      Monocytes, Absolute 0.39 10*3/mm3      Eosinophils, Absolute 0.00 10*3/mm3      Basophils, Absolute 0.02 10*3/mm3      Immature Grans, Absolute 0.04 10*3/mm3      nRBC 0.0 /100 WBC         Imaging Results (Last 24 Hours)     Procedure Component Value Units Date/Time    XR Chest 2 View [[id number]] Collected: 03/30/23 1600     Updated: 03/30/23 1604    Narrative:      XR CHEST 2 VW    Date of Exam: 3/30/2023 3:38 PM EDT    Indication: DKA.    Comparison: 12/7/2022    Findings:  The lungs are clear bilaterally. Pleural spaces are normal. Cardiac and mediastinal contours are within normal limits. Regional skeleton is within normal limits for age.      Impression:      Impression:    1. No acute cardiopulmonary disease.    Electronically Signed: Skip Valente    3/30/2023 4:01 PM EDT    Workstation ID: [id number]    CT Abdomen Pelvis Without Contrast [[id number]] Collected: 03/30/23 1542     Updated: 03/30/23 1551    Narrative:      CT ABDOMEN PELVIS WO CONTRAST    Date of Exam: 3/30/2023 2:34 PM EDT    Indication: abd pain, vomiting.    Comparison: 2/4/2023    Technique: Axial CT images were obtained of the abdomen and pelvis without the administration of contrast. Reconstructed coronal and sagittal images were also obtained. Automated exposure control and iterative construction methods were used.    Findings:  The lung bases are clear. Evaluation of the body wall soft tissues demonstrates no acute or suspicious findings. The osseous structures demonstrate no evidence of acute fracture or aggressive osseous lesion. The liver, spleen, pancreas and bilateral   adrenal glands appear similar to comparison, nonspecific bilateral low-density adrenal nodules present, incompletely characterized. The kidneys demonstrate no evidence of stones or hydronephrosis and the ureters are not dilated. There is redemonstrated   abnormal circumferential wall thickening of the bladder, with a large superiorly  projecting diverticulum noted. There is no free fluid or pneumoperitoneum. A right abdominal wall generator is now present, with leads terminating along the distal stomach.   Atherosclerotic nonaneurysmal abdominal aorta. Unremarkable gallbladder. Colonic diverticulosis is present, without specific acute inflammatory size diverticulitis. The appendix is normal. There is no retroperitoneal lymphadenopathy. The pelvic viscera   demonstrate no additional acute findings.      Impression:      Impression:  Stable CT appearance of the abdomen and pelvis as above, including circumferential wall thickening of the urinary bladder with a large superior projecting diverticulum present. There is no evidence of obstructive nephropathy. Colonic diverticulosis   changes are present without evidence of diverticulitis.    Postoperative changes are present from apparent interval stimulator placement, with generator along the right lower abdomen and leads visualized terminating along the stomach.      Electronically Signed: Arya Muñoz    3/30/2023 3:47 PM EDT    Workstation ID: [id number]        Orders (last 24 hrs)      Start     Ordered    03/31/23 1020  Potassium  Once,   Status:  Canceled         03/30/23 2220    03/31/23 0900  carvedilol (COREG) tablet 6.25 mg  2 Times Daily With Meals         03/31/23 0810    03/31/23 0823  Inpatient Admission  Once         03/31/23 0822    03/31/23 0815  POC Glucose Once  PROCEDURE ONCE         03/31/23 0813    03/31/23 0722  POC Glucose Once  PROCEDURE ONCE         03/31/23 0719    03/31/23 0645  potassium phosphate 15 mmol in 0.9% normal saline 250 mL IV  Every 3 Hours         03/31/23 0549    03/31/23 0600  Comprehensive Metabolic Panel  Morning Draw         03/30/23 1624    03/31/23 0600  CBC & Differential  Morning Draw         03/31/23 0451    03/31/23 0600  CBC Auto Differential  PROCEDURE ONCE         03/31/23 0451    03/31/23 0600  POC Glucose Once  PROCEDURE ONCE         03/31/23  0558    03/31/23 0458  POC Glucose Once  PROCEDURE ONCE         03/31/23 0445    03/31/23 0408  STAT Lactic Acid, Reflex  PROCEDURE ONCE         03/30/23 2242    03/31/23 0359  POC Glucose Once  PROCEDURE ONCE         03/31/23 0357    03/31/23 0337  POC Glucose Once  PROCEDURE ONCE         03/31/23 0334    03/31/23 0221  POC Glucose Once  PROCEDURE ONCE         03/31/23 0218    03/31/23 0109  POC Glucose Once  PROCEDURE ONCE         03/31/23 0104    03/31/23 0000  Hemoglobin & Hematocrit, Blood  Every 8 Hours       03/30/23 1845    03/30/23 2357  POC Glucose Once  PROCEDURE ONCE         03/30/23 2355    03/30/23 2329  POC Glucose Once  PROCEDURE ONCE         03/30/23 2327    03/30/23 2315  magnesium sulfate 2g/50 mL (PREMIX) infusion  Every 2 Hours         03/30/23 2220    03/30/23 2315  potassium chloride 10 mEq in 100 mL IVPB  Every 1 Hour         03/30/23 2220    03/30/23 2300  pantoprazole (PROTONIX) injection 40 mg  Every 12 Hours Scheduled         03/30/23 1620    03/30/23 2257  STAT Lactic Acid, Reflex  PROCEDURE ONCE         03/30/23 2028    03/30/23 2209  POC Glucose Once  PROCEDURE ONCE         03/30/23 2207    03/30/23 2106  POC Glucose Once  PROCEDURE ONCE         03/30/23 2103    03/30/23 2100  sodium chloride 0.9 % flush 10 mL  Every 12 Hours Scheduled         03/30/23 1529    03/30/23 2010  POC Glucose Once  PROCEDURE ONCE         03/30/23 2007 03/30/23 1917  STAT Lactic Acid, Reflex  PROCEDURE ONCE         03/30/23 1701    03/30/23 1846  Urinalysis, Microscopic Only - Urine, Clean Catch  Once         03/30/23 1845    03/30/23 1835  POC Glucose Once  PROCEDURE ONCE         03/30/23 1832    03/30/23 1749  POC Glucose Once  PROCEDURE ONCE         03/30/23 1739    03/30/23 1700  Vital Signs Every Hour and Per Hospital Policy Based on Patient Condition  Every Hour       03/30/23 1624    03/30/23 1700  Intake & Output  Every Hour       03/30/23 1624    03/30/23 1633  Blood Gas, Arterial With Co-Ox   PROCEDURE ONCE         03/30/23 1632    03/30/23 1624  Place Sequential Compression Device  Once         03/30/23 1624    03/30/23 1624  Maintain Sequential Compression Device  Continuous         03/30/23 1624    03/30/23 1623  Height & Weight  Once         03/30/23 1624    03/30/23 1623  Oral Care - Patient Not on NPPV & Not Intubated  Every Shift       03/30/23 1624    03/30/23 1623  Use Mobility Guidelines for Advancement of Activity  Continuous         03/30/23 1624    03/30/23 1616  STAT Lactic Acid, Reflex  PROCEDURE ONCE         03/30/23 1347    03/30/23 1600  Vital Signs  Every Hour       03/30/23 1529    03/30/23 1600  Strict Intake & Output  Every Hour      Comments: While on insulin infusion.    03/30/23 1529    03/30/23 1600  Basic Metabolic Panel  Every 4 Hours       03/30/23 1529    03/30/23 1600  Magnesium  Every 4 Hours       03/30/23 1529    03/30/23 1600  Phosphorus  Every 4 Hours       03/30/23 1529    03/30/23 1539  Urinalysis With Culture If Indicated - Urine, Clean Catch  Once         03/30/23 1538    03/30/23 1531  sodium chloride 0.9 % bolus  Continuous         03/30/23 1529    03/30/23 1531  insulin regular 1 unit/mL in 0.9% sodium chloride (Glucommander)  Titrated        Note to Pharmacy: Upon initiation of Glucommander insulin drip, make sure all other insulin orders and anti-diabetic medications have been discontinued.    03/30/23 1529    03/30/23 1531  ICU / CCU Bed Request ([facility]Y / [facility]UAN ALBERTO ONLY)  Once         03/30/23 1531    03/30/23 1530  niCARdipine (CARDENE) 25mg in 250mL NS infusion  Titrated         03/30/23 1528    03/30/23 1530  Daily Weights  Daily       03/30/23 1529    03/30/23 1530  Continuous Pulse Oximetry  Continuous         03/30/23 1529    03/30/23 1530  Formula for Corrected Serum Sodium: Corrected Na= (measured Sodium + [1.6 x ((Glucose - 100)/100)]  Per Order Details         03/30/23 1529    03/30/23 1530  Prior to initiating the Glucommander™, ensure all prior insulin  orders are discontinued.  Once        Comments: Prior to initiating the Glucommander™, ensure all prior insulin orders are discontinued.    03/30/23 1529    03/30/23 1530  PRIOR to START of Intravenous Insulin Infusion, Notify Provider if K+ is Less Than 3.3, for Extra Potassium Orders, if Indicated. Do Not Start Insulin Drip if K+ Less Than 3.3.  Per Order Details         03/30/23 1529    03/30/23 1530  Use a Dedicated Line for Insulin Infusion (If Possible).  May Use a Carrier Fluid of NS at KVO Rate if Insulin Rate is Insufficient to Maintain IV Patency.  Prime IV Line With Insulin Infusion  Continuous         03/30/23 1529    03/30/23 1530  Insert Peripheral IV x2  Once         03/30/23 1529    03/30/23 1530  Saline Lock & Maintain IV Access  Continuous         03/30/23 1529    03/30/23 1530  Once DKA meets resolution criteria, call provider for transition orders from intravenous to SQ insulin and IV fluids.  Per Order Details        Comments: RESOLUTION of DKA: Blood glucose < 200 mg/dL,AND TWO of the following criteria: Serum Bicarbonate > 15, Venous pH > 7.3, or Calculated Anion Gap </= 12 mEq/l.    03/30/23 1529    03/30/23 1530  Do not discontinue insulin infusion for 2 hours after first dose of long-acting subcutaneous insulin.  Per Order Details         03/30/23 1529    03/30/23 1530  If insulin infusion is discontinued, Glucommander must also be discontinued. If the insulin infusion is re-ordered, you must discontinue previous settings in Glucommander and start new.  Per Order Details        Comments: If insulin infusion is discontinued, Glucommander must also be discontinued. If the insulin infusion is re-ordered, you must discontinue previous settings in Glucommander and start new.    03/30/23 1529    03/30/23 1530  NPO Diet NPO Type: Strict NPO  Diet Effective Now         03/30/23 1529    03/30/23 1530  If patient is being fed a diet or bolus tube feeds, utilize the start meal feature / meal bolus  feature in Glucommander.  Continuous        Comments: If patient is being fed a diet or bolus tube feeds, utilize the start meal feature / meal bolus feature in Glucommander.    03/30/23 1529    03/30/23 1530  Notify Provider  Until Discontinued         03/30/23 1529    03/30/23 1530  Inpatient Diabetes Educator Consult  Once        Provider:  (Not yet assigned)    03/30/23 1529    03/30/23 1530  Phosphorus  STAT         03/30/23 1529    03/30/23 1530  Magnesium  STAT         03/30/23 1529    03/30/23 1530  Osmolality, Serum  STAT         03/30/23 1529    03/30/23 1530  Hemoglobin A1c  STAT         03/30/23 1529    03/30/23 1530  High Sensitivity Troponin T  STAT         03/30/23 1529    03/30/23 1530  ECG 12 Lead  STAT         03/30/23 1529    03/30/23 1530  XR Chest 2 View  1 Time Imaging         03/30/23 1529    03/30/23 1530  RN to Release PRN POC Glucose orders as per Glucommander  Continuous        Comments: Glucommander recommended POC testing will vary between 15 minutes and 2 hours. Release PRN POC Glucose orders as needed.    03/30/23 1529    03/30/23 1530  RN to Order STAT Glucose for BG less than 10 mg/dl or greater than 600 mg/dl  Continuous        Comments: If blood glucose reading is less than 10 mg/dl or greater than 600 mg/dl, obtain STAT glucose by Lab. Do not delay treatment of unstable patient in order to obtain glucose sample from Lab. Inform physician of results.    03/30/23 1529    03/30/23 1530  DKA Patients With Phosphorus Level 1 or Higher Do NOT Require Replacement (While Insulin Infusing)  Continuous         03/30/23 1529    03/30/23 1529  sodium chloride 0.45 % with KCl 20 mEq/L infusion  Continuous PRN,   Status:  Discontinued         03/30/23 1529    03/30/23 1529  sodium chloride 0.45 % 1,000 mL with potassium chloride 40 mEq infusion  Continuous PRN,   Status:  Discontinued         03/30/23 1529    03/30/23 1529  dextrose 5 % and sodium chloride 0.45 % infusion  Continuous PRN          03/30/23 1529    03/30/23 1529  dextrose 5 % and sodium chloride 0.45 % with KCl 20 mEq/L infusion  Continuous PRN         03/30/23 1529    03/30/23 1529  dextrose 5 % and sodium chloride 0.45 % with KCl 40 mEq/L infusion  Continuous PRN         03/30/23 1529    03/30/23 1529  Potassium Replacement - Follow Nurse / BPA Driven Protocol  As Needed         03/30/23 1529    03/30/23 1529  Magnesium Standard Dose Replacement - Follow Nurse / BPA Driven Protocol  As Needed         03/30/23 1529    03/30/23 1529  Phosphorus Replacement - Follow Nurse / BPA Driven Protocol  As Needed         03/30/23 1529    03/30/23 1529  dextrose (GLUTOSE) oral gel 15 g  Every 15 Minutes PRN         03/30/23 1529    03/30/23 1529  dextrose (D50W) (25 g/50 mL) IV injection 10-50 mL  Every 15 Minutes PRN         03/30/23 1529    03/30/23 1529  glucagon (GLUCAGEN) injection 1 mg  Every 15 Minutes PRN         03/30/23 1529    03/30/23 1529  sodium chloride 0.9 % infusion  Continuous PRN,   Status:  Discontinued         03/30/23 1529    03/30/23 1529  sodium chloride 0.9 % with KCl 20 mEq/L infusion  Continuous PRN,   Status:  Discontinued         03/30/23 1529    03/30/23 1529  sodium chloride 0.9 % with KCl 40 mEq/L infusion  Continuous PRN,   Status:  Discontinued         03/30/23 1529    03/30/23 1529  dextrose 5 % and sodium chloride 0.9 % infusion  Continuous PRN         03/30/23 1529    03/30/23 1529  dextrose 5 % and sodium chloride 0.9 % with KCl 20 mEq/L infusion  Continuous PRN         03/30/23 1529    03/30/23 1529  dextrose 5 % and sodium chloride 0.9 % with KCl 40 mEq/L infusion  Continuous PRN         03/30/23 1529    03/30/23 1529  sodium chloride 0.45 % infusion  Continuous PRN,   Status:  Discontinued         03/30/23 1529    03/30/23 1529  sodium chloride 0.9 % flush 10 mL  As Needed,   Status:  Discontinued         03/30/23 1529    03/30/23 1529  sodium chloride 0.9 % infusion 40 mL  As Needed         03/30/23 1529     03/30/23 1517  Initiate Observation Status  Once         03/30/23 1516    03/30/23 1517  ED IP Bed Request  Once         03/30/23 1516    03/30/23 1517  Cardiac Monitoring  Continuous        Comments: Follow Standing Orders As Outlined in Process Instructions (Open Order Report to View Full Instructions)    03/30/23 1516    03/30/23 1516  High Sensitivity Troponin T 2Hr  PROCEDURE ONCE         03/30/23 1557    03/30/23 1506  Beta Hydroxybutyrate Quantitative  Once         03/30/23 1505    03/30/23 1504  Ketone Bodies, Serum (Not performed at [place])  Once         03/30/23 1503    03/30/23 1504  Blood Gas, Arterial -With Co-Ox Panel: Yes  Once         03/30/23 1503    03/30/23 1503  hydrALAZINE (APRESOLINE) injection 20 mg  Once,   Status:  Discontinued         03/30/23 1501    03/30/23 1503  I attest that I reassessed tissue perfusion after fluid resuscitation was completed  Once        Comments: I attest that I reassessed tissue perfusion after fluid resuscitation was completed    03/30/23 1506    03/30/23 1500  Urinalysis With Microscopic If Indicated (No Culture) - Urine, Catheter  Once,   Status:  Canceled         03/30/23 1500    03/30/23 1500  Cath UA and drain bladder.  Record amount please  Misc Nursing Order (Specify)  Once        Comments: Cath UA and drain bladder.  Record amount please    03/30/23 1500    03/30/23 1422  CT Abdomen Pelvis Without Contrast  1 Time Imaging        Comments: NON-CONTRASTED STUDY      03/30/23 1422    03/30/23 1355  sodium chloride 0.9 % bolus 1,000 mL  Once         03/30/23 1353    03/30/23 1354  promethazine (PHENERGAN) 12.5 mg in sodium chloride 0.9 % 50 mL  Every 6 Hours PRN         03/30/23 1354    03/30/23 1318  Type & Screen  Once         03/30/23 1317    03/30/23 1259  CT Angiogram Abdomen Pelvis  1 Time Imaging,   Status:  Canceled         03/30/23 1259    03/30/23 1258  ondansetron (ZOFRAN) injection 4 mg  Once         03/30/23 1256    03/30/23 1257  pantoprazole  (PROTONIX) injection 80 mg  Once         03/30/23 1255    03/30/23 1255  POC Occult Blood Stool  Once         03/30/23 1254    03/30/23 1255  Occult Blood Gastric / Duodenum - Gastric Contents,  Once         03/30/23 1254    03/30/23 1150  sodium chloride 0.9 % bolus 1,000 mL  Once         03/30/23 1148    03/30/23 1150  ondansetron (ZOFRAN) injection 4 mg  Once         03/30/23 1148    03/30/23 1148  Insert Peripheral IV  Once         03/30/23 1147    03/30/23 1148  [place] Draw  Once         03/30/23 1147    03/30/23 1148  CBC & Differential  Once         03/30/23 1147    03/30/23 1148  Comprehensive Metabolic Panel  Once         03/30/23 1147    03/30/23 1148  Lipase  Once         03/30/23 1147    03/30/23 1148  Urinalysis With Microscopic If Indicated (No Culture) - Urine, Clean Catch  Once,   Status:  Canceled         03/30/23 1147    03/30/23 1148  Lactic Acid, Plasma  Once         03/30/23 1147    03/30/23 1148  Green Top (Gel)  PROCEDURE ONCE         03/30/23 1148    03/30/23 1148  Lavender Top  PROCEDURE ONCE         03/30/23 1148    03/30/23 1148  Gold Top - SST  PROCEDURE ONCE         03/30/23 1148    03/30/23 1148  Gray Top  PROCEDURE ONCE         03/30/23 1148    03/30/23 1148  Light Blue Top  PROCEDURE ONCE         03/30/23 1148    03/30/23 1148  CBC Auto Differential  PROCEDURE ONCE         03/30/23 1148    03/30/23 1147  sodium chloride 0.9 % flush 10 mL  As Needed         03/30/23 1147    Unscheduled  POC Glucose PRN  As Needed      Comments: [employer] recommended POC testing will very between 15 minutes and 2 hours. Release PRN POC Glucose orders as needed.      03/30/23 1529    Unscheduled  Treat Hypoglycemia As Recommended By [employer]™ & Notify Provider of Treatment  As Needed      Comments: Follow Hypoglycemia Orders As Outlined in Process Instructions (Open Order Report to View Full Instructions)  Notify Provider Any Time Hypoglycemia Treatment is Administered    03/30/23 5412     Unscheduled  If Insulin Infusion is Paused - Follow [employer] Instructions  As Needed       03/30/23 1529    --  SCANNED - TELEMETRY           03/30/23 0000                Physician Progress Notes (last 24 hours)  Notes from 03/30/23 0829 through 03/31/23 0829   No notes of this type exist for this encounter.         Consult Notes (last 24 hours)  Notes from 03/30/23 0829 through 03/31/23 0829   No notes of this type exist for this encounter.

## 2023-03-31 NOTE — PROGRESS NOTES
"Critical Care Note     LOS: 0 days   Patient Care Team:  Monet Howe APRN as PCP - General (Nurse Practitioner)  Markus Stahl MD as Consulting Physician (Gastroenterology)  Magali Pandey APRN as Nurse Practitioner (Gastroenterology)  Ludwig Mitchell MD as Consulting Physician (Cardiology)  Clara Rojas APRN as Nurse Practitioner (Nurse Practitioner)    Chief Complaint/Reason for visit:    Chief Complaint   Patient presents with   • Nausea     Pt presents via EMS with c/o nausea.      DKA  Hypertension  History of PFO, atrial fibrillation on anticoagulation    Subjective     Interval History:     Hospitalized yesterday with DKA.  Apparently insulin pump stopped working for the last 3 days and she has had vomiting.  This morning her anion gap is closed 10.  She has had no further vomiting.  She was also very hypertensive in the emergency room and was started on a Cardene drip.  That has been successfully weaned off.  She takes carvedilol at home.    Review of Systems:    All systems were reviewed and negative except as noted in subjective.    Medical history, surgical history, social history, family history reviewed    Objective     Intake/Output:    Intake/Output Summary (Last 24 hours) at 3/31/2023 1117  Last data filed at 3/31/2023 1000  Gross per 24 hour   Intake 6834.1 ml   Output 1900 ml   Net 4934.1 ml       Nutrition:  Diet: Liquid Diets, Diabetic Diets; Clear Liquid; Consistent Carbohydrate; Texture: Regular Texture (IDDSI 7); Fluid Consistency: Thin (IDDSI 0)    Infusions:  niCARdipine, 5-15 mg/hr, Last Rate: Stopped (03/31/23 0645)          Telemetry: Sinus rhythm             Vital Signs  Blood pressure 151/70, pulse 89, temperature 98.9 °F (37.2 °C), temperature source Axillary, resp. rate 16, height 172.7 cm (68\"), weight 59.5 kg (131 lb 2.8 oz), SpO2 100 %, not currently breastfeeding.    Physical Exam:  General Appearance:   Middle-aged woman supine in bed in no distress " [facility] Emergency Department      Chief Complaint   Patient presents with   • Psychiatric Evaluation        Time Seen: 1445    HPI    Patient is a 60 year old male who presents to the emergency department via EMS because the patient has not been taking care of himself for the past several days not eating and drinking and then made suicidal comments to his family.  Patient's daughter filled out a petition stating that the patient threatened to shoot himself with a gun.  Patient states he has had a fall recently and has broken ribs.  Patient is uncooperative with history and exam and is verbally and physically abusive and threatening.        Review of Systems   Unable to perform ROS: Psychiatric disorder        PAST MEDICAL HISTORY:    Essential (primary) hypertension                              Atrial fibrillation (CMS/HCC)                                 Congestive cardiac failure (CMS/HCC)                          Alcohol dependence (CMS/HCC)                                  Anemia                                                        Anxiety                                                       Coronary artery disease                                       Tobacco dependency                                            PAST SURGICAL HISTORY:    ABLATION - ATRIAL FIBRILLATION                                CORONARY ARTERY BYPASS GRAFT                                    Comment: S/p CATH, s/p CABG post op day#1CABG ×4 (LIMA                to LAD, SVG sequenced to OM, PLB, and PDA    Social History     Tobacco Use   • Smoking status: Some Days     Packs/day: 0.50     Years: 15.00     Pack years: 7.50     Types: Cigarettes   • Smokeless tobacco: Never   • Tobacco comments:     once in awhile   Vaping Use   • Vaping Use: never used   • Substances: Nicotine   Substance Use Topics   • Alcohol use: Not Currently     Alcohol/week: 4.0 standard drinks     Types: 4 Cans of beer per week   • Drug use:    Head:   Atraumatic   Eyes:          No jaundice   Ears:     Throat:  Oral mucosa moist   Neck:  Trachea midline, no palpable thyroid   Back:      Lungs:    Symmetric chest expansion without wheeze    Heart:   Regular rhythm, S1, S2 auscultated   Abdomen:    Nondistended, bowel sounds present, soft   Rectal:   Deferred   Extremities:  No pitting edema   Pulses:  Palpable radial pulses   Skin:  Warm and dry   Lymph nodes:  No cervical adenopathy   Neurologic:  Alert, oriented, speech fluent,  equal      Results Review:     I reviewed the patient's new clinical results.   Results from last 7 days   Lab Units 03/31/23  0838 03/31/23  0452 03/30/23  2208 03/30/23  1617 03/30/23  1316   SODIUM mmol/L 143 144  144 144   < > 142   POTASSIUM mmol/L 4.5 3.8  3.8 2.9*   < > 3.1*   CHLORIDE mmol/L 113* 114*  114* 108*   < > 97*   CO2 mmol/L 16.0* 20.0*  20.0* 17.0*   < > 17.0*   BUN mg/dL 17 19  19 26*   < > 29*   CREATININE mg/dL 1.01* 1.17*  1.17* 1.29*   < > 1.81*   CALCIUM mg/dL 8.5* 8.7  8.7 8.8   < > 9.9   BILIRUBIN mg/dL  --  0.5  --   --  0.7   ALK PHOS U/L  --  82  --   --  109   ALT (SGPT) U/L  --  7  --   --  10   AST (SGOT) U/L  --  17  --   --  16   GLUCOSE mg/dL 185* 112*  112* 216*   < > 449*    < > = values in this interval not displayed.     Results from last 7 days   Lab Units 03/31/23  0838 03/31/23  0452 03/30/23  1957 03/30/23  1233   WBC 10*3/mm3  --  12.52*  --  8.79   HEMOGLOBIN g/dL 10.1* 10.4* 11.3* 13.3   HEMATOCRIT % 29.0* 30.6* 32.8* 38.3   PLATELETS 10*3/mm3  --  325  --  405     Results from last 7 days   Lab Units 03/30/23  1632   PH, ARTERIAL pH units 7.451*   PO2 ART mm Hg 112.0*   PCO2, ARTERIAL mm Hg 24.6*   HCO3 ART mmol/L 17.1*     Lab Results   Component Value Date    BLOODCX No growth at 5 days 02/04/2023    BLOODCX No growth at 5 days 02/04/2023     Lab Results   Component Value Date    URINECX 50,000 CFU/mL Mixed Hortensia Isolated 03/13/2022       I reviewed the patient's  Never        Family History   Problem Relation Age of Onset   • Heart Mother    • Myocardial Infarction Father    • Heart Paternal Grandfather        ED Triage Vitals [02/24/23 1437]   Enc Vitals Group      BP (!) 172/97      Heart Rate 100      Resp (!) 22      Temp 97.5 °F (36.4 °C)      Temp src Oral      SpO2 98 %      Weight       Height       Head Circumference       Peak Flow       Pain Score       Pain Loc       Pain Edu?       Excl. in GC?         Physical Exam  Vitals and nursing note reviewed.   Constitutional:       Comments: Thin and disheveled   HENT:      Head: Normocephalic.      Comments: Old bruising to the right cheek     Mouth/Throat:      Mouth: Mucous membranes are dry.   Eyes:      General: No scleral icterus.     Conjunctiva/sclera: Conjunctivae normal.   Cardiovascular:      Rate and Rhythm: Normal rate and regular rhythm.      Pulses: Normal pulses.   Pulmonary:      Effort: Pulmonary effort is normal.      Breath sounds: Normal breath sounds.   Abdominal:      General: Abdomen is flat. There is no distension.      Palpations: Abdomen is soft.      Tenderness: There is no abdominal tenderness.   Musculoskeletal:         General: Normal range of motion.      Cervical back: Neck supple.   Skin:     General: Skin is warm and dry.      Coloration: Skin is not jaundiced.   Neurological:      General: No focal deficit present.      Mental Status: He is alert and oriented to person, place, and time.   Psychiatric:      Comments: Suicidal thoughts inability to care for himself.  Patient is physically and verbally abusive in the emergency department patient is uncooperative with care.              Results    Results for orders placed or performed during the hospital encounter of 02/24/23   Comprehensive Metabolic Panel   Result Value    Fasting Status     Sodium 128 (L)    Potassium 4.1    Chloride 91 (L)    Carbon Dioxide 23    Anion Gap 18    Glucose 102 (H)    BUN 14    Creatinine 0.88     Glomerular Filtration Rate >90     Comment: eGFR results = or >60 mL/min/1.73m2 = Normal kidney function. Estimated GFR calculated using the CKD-EPI-R (2021) equation that does not include race in the creatinine calculation.    BUN/ Creatinine Ratio 16    Calcium 8.6    Bilirubin, Total 0.8    GOT/ (H)    GPT/ALT 67 (H)    Alkaline Phosphatase 140 (H)    Albumin 3.8    Protein, Total 8.7 (H)    Globulin 4.9 (H)    A/G Ratio 0.8 (L)   Alcohol   Result Value    Alcohol 240 (H)   Magnesium   Result Value    Magnesium 1.4 (L)   COVID/Flu/RSV panel   Result Value    Rapid SARS-COV-2 by PCR Not Detected    Influenza A by PCR Not Detected    Influenza B by PCR Not Detected    RSV BY PCR Not Detected    Isolation Guidelines      Comment: Do not use this test result as the sole decision-maker for discontinuation of isolation.   Clinical evaluation should be considered for other respiratory illness requiring transmission-based isolation.    -    No fever (<99.0 F/37.2 C) for at least 24 hours without the use of fever-reducing medications    AND  -    Respiratory symptoms have improved or resolved (e.g. cough, shortness of breath)     AND  -    COVID-19 negative test    See COVID-19 Deisolation Resource Guide    Procedural Comment      Comment: SARS-COV-2 nucleic acid has not been detected indicating the absence of COVID-19.    This test was performed using the [employer] Xpert Xpress SARS-CoV-2/Flu/RSV RT-PCR test that has been given Emergency Use Authorization (EUA) by the [place] Food and Drug Administration (FDA).  These tests are considered definitive and do not need to be confirmed by another method.   Acetaminophen Level   Result Value    Acetaminophen <2 (L)   Salicylate Level   Result Value    Salicylate 7.1   CBC with Automated Differential (performable only)   Result Value    WBC 6.0    RBC 4.54    HGB 14.1    HCT 40.6    MCV 89.4    MCH 31.1    MCHC 34.7    RDW-CV 16.8 (H)    RDW-SD 54.7 (H)    PLT 59 (L)  new imaging including images and reports.    Chest x-ray clear    All medications reviewed.   atorvastatin, 80 mg, Oral, Nightly  carvedilol, 6.25 mg, Oral, BID With Meals  donepezil, 5 mg, Oral, Nightly  FLUoxetine, 20 mg, Oral, Daily  insulin detemir, 15 Units, Subcutaneous, Daily  insulin lispro, 0-7 Units, Subcutaneous, TID AC  Insulin Lispro, 5 Units, Subcutaneous, TID With Meals  [START ON 4/1/2023] pantoprazole, 40 mg, Oral, Daily  potassium phosphate, 15 mmol, Intravenous, Q3H  rivaroxaban, 20 mg, Oral, Daily With Dinner  sodium chloride, 10 mL, Intravenous, Q12H  terazosin, 2 mg, Oral, Nightly          Assessment & Plan       DKA (diabetic ketoacidoses)    Uncontrolled type 1 diabetes mellitus with hyperglycemia (HCC)    Acute kidney injury (HCC)    Hypertensive urgency    PFO (patent foramen ovale)    Intractable vomiting    Hypokalemia       #DKA  #Intractable vomiting  #Elevated lactate    -DKA likely precipitated by GI illness, possible insulin pump malfunction.  Elevated serum beta hydroxybutyrate.  Placed on DKA protocol following resuscitation with 2L of normal saline in the emergency department  -Zofran as needed  -Anion gap 10 this morning we will transition insulin  -A1c 8.0 on admission      #Hematemesis  -Per report but no active events since admission.  Started PPI twice daily.  initial stable H&H at 13.3/38  -Hemoglobin 10.1 this morning  -Diverticulosis without diverticulitis on CT abdomen/pelvis (3/30)     #Hypertensive urgency  -Cardene drip weaned overnight  -Carvedilol restarted  -History of atrial flutter/fibrillation, PFO, will restart Xarelto     #RAFAT  #Hypokalemia  #Hypophosphatemia  -Etiology likely prerenal in the setting of volume loss  -Serum creatinine 1.36 yesterday, 1.01 today  -Replacing electrolytes per DKA protocol, potassium adequate at 4.5, magnesium adequate at 3.5, phosphorus now improved to 2.5  -UA positive for glucose, ketones, protein, no bacteria or white      Comment: Platelet count verified by smear review    NRBC 0    Neutrophil, Percent 61    Lymphocytes, Percent 24    Mono, Percent 12    Eosinophils, Percent 2    Basophils, Percent 1    Immature Granulocytes 0    Absolute Neutrophils 3.6    Absolute Lymphocytes 1.4    Absolute Monocytes 0.7    Absolute Eosinophils  0.1    Absolute Basophils 0.1    Absolute Immature Granulocytes 0.0   Manual Differential   Result Value    RBC Morphology Normal    Platelet Morphology Normal        Imaging Results    None           Medications   magnesium sulfate 2 g in 50 mL premix IVPB (has no administration in time range)   sodium chloride 0.9% infusion (has no administration in time range)   ziprasidone (GEODON) injection 20 mg (20 mg Intramuscular Given 2/24/23 1454)   STERILE WATER FOR INJECTION IJ SOLN Pyxis Override (10 mLs  Given 2/24/23 1455)       ED Course as of 02/24/23 1754 Fri Feb 24, 2023 1750 Case discussed with  from [facility] will admit.  We will consult psychiatry. [MG]   1751  pagesylvie regarding psychiatric consult.  Case was discussed. [MG]      ED Course User Index  [MG] Juan Jose Reynoso, DO        Vitals:    02/24/23 1437 02/24/23 1611   BP: (!) 172/97 (!) 137/94   Pulse: 100 (!) 103   Resp: (!) 22 18   Temp: 97.5 °F (36.4 °C)    TempSrc: Oral    SpO2: 98% 99%        EKG/Rhythm Strip:    Time: 1531:  Interpretation: Normal sinus rhythm rate of 85 bpm no ST segment elevation normal EKG           Medical Decision Making    Differential Diagnosis: 1.  Suicidal ideation 2.  Alcohol intoxication 3.  Electrolyte abnormalities    ED COURSE:        Critical Care:  Given the high probability of imminent or life threatening deterioration of the patient’s condition without intervention, the patient was assessed by myself and the nurse, and cardiac monitoring initiated. The patient was also placed on oxygen and continuous pulse oximetry initiated. During the course of the patient’s stay, I spent a  cells      PLAN:    Transition to long-acting, meal coverage and sliding scale  Restart carvedilol, terazosin (substitute for Cardura)  Restart Xarelto  Mobilize  Initiate clear liquids and advanced as tolerates  Diabetes educator  Telemetry    VTE Prophylaxis: anticoagulated    Stress Ulcer Prophylaxis: Protonix    Marlene Greenberg MD  03/31/23  11:17 EDT      Time:25min   considerable amount of time at the bedside performing serial re-evaluations of the patient’s hemodynamic and clinical status because of the recognized potential threat to life or limb in this condition. Clinical management of this patient involved high complexity decision making to assess, manipulate, and support vital organ system failure. I then had a chance to review all of the available laboratory and radiographic studies obtained today, and I also reviewed old records available to me at the time. Sequential vital signs were obtained. Critical care time noted below was time spent engaged in work directly related to the individual patient’s care, not including time performing procedures; however it does include time spent at the immediate bedside or elsewhere in the Emergency Department.    TOTAL CRITICAL CARE TIME ELAPSED: 40 minutes.    BODY SYSTEM AT RISK: Psychiatric    Patient was uncooperative with medical screening exam and refused to get in a gown was physically threatening verbally abusive to the staff requiring physical restraints and chemical restraints.  Patient received Geodon 20 mg IM.  Patient will be evaluated medically and social service to interview the patient regarding his mental health and suicidal ideation.  The petition was filled out by the patient's daughter was reviewed.    Patient had low sodium low magnesium which will require replacement.  Patient will require detox and has a history for alcohol withdrawal seizure.  Patient will have a medical admission with psychiatry to consult.  Patient received IV normal saline and IV magnesium.    Old Records Reviewed: Discharge summary dated 1/28/2023 was reviewed.  Patient had syncope/seizure secondary to alcohol abuse        Scoring Tool: Face-to-face was documented    Case discussed with: Best practices and psychiatry       Diagnosis    ED Diagnoses     Diagnosis Comment Associated Orders       Final diagnoses    Alcoholic intoxication  without complication (CMS/[facility]) -- --    Suicidal ideation -- --    Hypomagnesemia -- --                    Disposition    Admit 2/24/2023  5:46 PM  Telemetry Bed?: Yes  Admitting Physician: BELÉN SALAZAR [[id number]]  Is this a telephone or verbal order?: This is a verbal order from the admitting physician.        Juan Jose Reynoso,   02/24/23 2661

## 2023-03-31 NOTE — PLAN OF CARE
Goal Outcome Evaluation:  Plan of Care Reviewed With: patient        Progress: improving  Outcome Evaluation: Pt admitted from ER for DKA. Pt had been experiencing n/v for 48 hours and worse four hours prior to arrival and called EMS. VSS, cardene off, insulin infusing at 0.9u/kg/hr. Pt received 6 runs of 10meq of potassium, phosphorus replacement initiated. D5 1/2 NS w/ 20KCL infusing at 150ml/hr. Pt alert, oriented and cooperative. Labs noted.

## 2023-03-31 NOTE — CASE MANAGEMENT/SOCIAL WORK
"Continued Stay Note  [facility]     Patient Name: Thelma Michael  MRN: [id number]  Today's Date: 3/31/2023    Admit Date: 3/30/2023    Plan: Home with Home Health   Discharge Plan     Row Name 03/31/23 1818       Plan    Plan Home with Home Health    Patient/Family in Agreement with Plan unable to assess    Plan Comments Ms. Michael is sleeping when I visited her room today.  Per previous admission in February \"Patient resides in [facility] with her adult son. Patient is independent with ADL’s, denies any DME. Patient has medical insurance, prescription coverage and is able to afford/obtain medications without difficulty.\"  She was discharged with [facility] services previously.  CM will contt o follow the plan of care, confirm services with [facility] and complete initial assessment when patient more alert and oriented.    Final Discharge Disposition Code 06 - home with home health care               Discharge Codes    No documentation.                     Nicky Guardado RN    "

## 2023-03-31 NOTE — PLAN OF CARE
Goal Outcome Evaluation:  Plan of Care Reviewed With: patient        Progress: improving  Outcome Evaluation: VSS. insulin gtt stopped and transitioned to SQ. phos replaced. c/o headache and abdominal discomfort, tylenol given. Over 1700ml UOP

## 2023-04-01 LAB
ANION GAP SERPL CALCULATED.3IONS-SCNC: 13 MMOL/L (ref 5–15)
BUN SERPL-MCNC: 14 MG/DL (ref 8–23)
BUN/CREAT SERPL: 11.3 (ref 7–25)
CALCIUM SPEC-SCNC: 9 MG/DL (ref 8.6–10.5)
CHLORIDE SERPL-SCNC: 109 MMOL/L (ref 98–107)
CO2 SERPL-SCNC: 23 MMOL/L (ref 22–29)
CREAT SERPL-MCNC: 1.24 MG/DL (ref 0.57–1)
DEPRECATED RDW RBC AUTO: 44.1 FL (ref 37–54)
EGFRCR SERPLBLD CKD-EPI 2021: 48.7 ML/MIN/1.73
ERYTHROCYTE [DISTWIDTH] IN BLOOD BY AUTOMATED COUNT: 13.3 % (ref 12.3–15.4)
GLUCOSE BLDC GLUCOMTR-MCNC: 112 MG/DL (ref 70–130)
GLUCOSE BLDC GLUCOMTR-MCNC: 206 MG/DL (ref 70–130)
GLUCOSE BLDC GLUCOMTR-MCNC: 88 MG/DL (ref 70–130)
GLUCOSE BLDC GLUCOMTR-MCNC: 88 MG/DL (ref 70–130)
GLUCOSE BLDC GLUCOMTR-MCNC: 98 MG/DL (ref 70–130)
GLUCOSE SERPL-MCNC: 179 MG/DL (ref 65–99)
HCT VFR BLD AUTO: 33.8 % (ref 34–46.6)
HGB BLD-MCNC: 11.2 G/DL (ref 12–15.9)
MCH RBC QN AUTO: 29.7 PG (ref 26.6–33)
MCHC RBC AUTO-ENTMCNC: 33.1 G/DL (ref 31.5–35.7)
MCV RBC AUTO: 89.7 FL (ref 79–97)
PHOSPHATE SERPL-MCNC: 4 MG/DL (ref 2.5–4.5)
PLATELET # BLD AUTO: 321 10*3/MM3 (ref 140–450)
PMV BLD AUTO: 10.3 FL (ref 6–12)
POTASSIUM SERPL-SCNC: 4.1 MMOL/L (ref 3.5–5.2)
RBC # BLD AUTO: 3.77 10*6/MM3 (ref 3.77–5.28)
SODIUM SERPL-SCNC: 145 MMOL/L (ref 136–145)
WBC NRBC COR # BLD: 9.47 10*3/MM3 (ref 3.4–10.8)

## 2023-04-01 PROCEDURE — 63710000001 INSULIN DETEMIR PER 5 UNITS: Performed by: INTERNAL MEDICINE

## 2023-04-01 PROCEDURE — 25010000002 HYDRALAZINE PER 20 MG

## 2023-04-01 PROCEDURE — 84100 ASSAY OF PHOSPHORUS: CPT | Performed by: INTERNAL MEDICINE

## 2023-04-01 PROCEDURE — 80048 BASIC METABOLIC PNL TOTAL CA: CPT | Performed by: INTERNAL MEDICINE

## 2023-04-01 PROCEDURE — 99232 SBSQ HOSP IP/OBS MODERATE 35: CPT | Performed by: INTERNAL MEDICINE

## 2023-04-01 PROCEDURE — 25010000002 PROMETHAZINE PER 50 MG: Performed by: EMERGENCY MEDICINE

## 2023-04-01 PROCEDURE — 63710000001 INSULIN LISPRO (HUMAN) PER 5 UNITS: Performed by: INTERNAL MEDICINE

## 2023-04-01 PROCEDURE — 82962 GLUCOSE BLOOD TEST: CPT

## 2023-04-01 PROCEDURE — 25010000002 ONDANSETRON PER 1 MG

## 2023-04-01 PROCEDURE — 85027 COMPLETE CBC AUTOMATED: CPT | Performed by: INTERNAL MEDICINE

## 2023-04-01 RX ORDER — ONDANSETRON 2 MG/ML
4 INJECTION INTRAMUSCULAR; INTRAVENOUS ONCE
Status: COMPLETED | OUTPATIENT
Start: 2023-04-01 | End: 2023-04-01

## 2023-04-01 RX ORDER — HYDRALAZINE HYDROCHLORIDE 25 MG/1
25 TABLET, FILM COATED ORAL ONCE
Status: COMPLETED | OUTPATIENT
Start: 2023-04-01 | End: 2023-04-01

## 2023-04-01 RX ORDER — HYDRALAZINE HYDROCHLORIDE 20 MG/ML
10 INJECTION INTRAMUSCULAR; INTRAVENOUS EVERY 6 HOURS PRN
Status: DISCONTINUED | OUTPATIENT
Start: 2023-04-01 | End: 2023-04-03

## 2023-04-01 RX ADMIN — TERAZOSIN HYDROCHLORIDE 2 MG: 2 CAPSULE ORAL at 20:32

## 2023-04-01 RX ADMIN — ONDANSETRON 4 MG: 2 INJECTION INTRAMUSCULAR; INTRAVENOUS at 13:35

## 2023-04-01 RX ADMIN — Medication 10 ML: at 08:42

## 2023-04-01 RX ADMIN — HYDRALAZINE HYDROCHLORIDE 25 MG: 25 TABLET, FILM COATED ORAL at 18:02

## 2023-04-01 RX ADMIN — INSULIN LISPRO 3 UNITS: 100 INJECTION, SOLUTION INTRAVENOUS; SUBCUTANEOUS at 08:42

## 2023-04-01 RX ADMIN — CARVEDILOL 6.25 MG: 6.25 TABLET, FILM COATED ORAL at 17:16

## 2023-04-01 RX ADMIN — DONEPEZIL HYDROCHLORIDE 5 MG: 5 TABLET ORAL at 20:32

## 2023-04-01 RX ADMIN — FLUOXETINE 20 MG: 20 CAPSULE ORAL at 08:42

## 2023-04-01 RX ADMIN — ATORVASTATIN CALCIUM 80 MG: 40 TABLET, FILM COATED ORAL at 20:32

## 2023-04-01 RX ADMIN — RIVAROXABAN 20 MG: 20 TABLET, FILM COATED ORAL at 17:16

## 2023-04-01 RX ADMIN — CARVEDILOL 6.25 MG: 6.25 TABLET, FILM COATED ORAL at 08:42

## 2023-04-01 RX ADMIN — PROMETHAZINE HYDROCHLORIDE 12.5 MG: 25 INJECTION INTRAMUSCULAR; INTRAVENOUS at 10:37

## 2023-04-01 RX ADMIN — INSULIN DETEMIR 15 UNITS: 100 INJECTION, SOLUTION SUBCUTANEOUS at 08:42

## 2023-04-01 RX ADMIN — PANTOPRAZOLE SODIUM 40 MG: 40 TABLET, DELAYED RELEASE ORAL at 08:41

## 2023-04-01 RX ADMIN — HYDRALAZINE HYDROCHLORIDE 10 MG: 20 INJECTION INTRAMUSCULAR; INTRAVENOUS at 15:04

## 2023-04-01 RX ADMIN — INSULIN LISPRO 5 UNITS: 100 INJECTION, SOLUTION INTRAVENOUS; SUBCUTANEOUS at 08:42

## 2023-04-01 NOTE — PLAN OF CARE
Goal Outcome Evaluation:              Outcome Evaluation: Patient blood sugar 70s in beginning of shift. Provided juice, blood sugar then in 100s. BM x1. Good UOP. VSS other than some bradycardia when sleeping.

## 2023-04-01 NOTE — PLAN OF CARE
Goal Outcome Evaluation:   * Patient lethargic with complaints of abdominal discomfort with intermittent  N/V throughout the day.   Zofran and phenergan given   Fluids and PO intake encouraged, patient not tolerating at this time.   Patient tolerating being up to selma for 30m. Refusing ambulation at this time.   X1 assist, uses walker.   UOP 200ml   Hypertension noted, Hydralazine given.

## 2023-04-01 NOTE — PROGRESS NOTES
"Intensive Care Follow-up     Hospital:  LOS: 1 day   Ms. Thelma Michael, 62 y.o. female is followed for:   DKA (diabetic ketoacidosis)        Subjective   Interval History:  The chart has been reviewed.  The patient states that she is feeling much better today.  She slept well through the night.  She has been tolerating liquid diet currently.  Currently tolerating insulin regimen.  She denies any nausea, vomiting, headache, shortness of breath, or chest pain.    The patient's past medical, surgical and social history were reviewed and updated in Epic as appropriate.        Objective     Infusions:  niCARdipine, 5-15 mg/hr, Last Rate: Stopped (03/31/23 0645)      Medications:  atorvastatin, 80 mg, Oral, Nightly  carvedilol, 6.25 mg, Oral, BID With Meals  donepezil, 5 mg, Oral, Nightly  FLUoxetine, 20 mg, Oral, Daily  insulin detemir, 15 Units, Subcutaneous, Daily  insulin lispro, 0-7 Units, Subcutaneous, TID AC  Insulin Lispro, 5 Units, Subcutaneous, TID With Meals  pantoprazole, 40 mg, Oral, Daily  rivaroxaban, 20 mg, Oral, Daily With Dinner  terazosin, 2 mg, Oral, Nightly        Vital Sign Min/Max for last 24 hours  Temp  Min: 97.6 °F (36.4 °C)  Max: 98.9 °F (37.2 °C)   BP  Min: 107/78  Max: 176/84   Pulse  Min: 55  Max: 91   Resp  Min: 16  Max: 20   SpO2  Min: 98 %  Max: 100 %   No data recorded       Input/Output for last 24 hour shift  03/31 0701 - 04/01 0700  In: 100 [P.O.:100]  Out: 2250 [Urine:2250]      Objective:  General Appearance:  Comfortable, well-appearing and in no acute distress.    Vital signs: (most recent): Blood pressure 140/70, pulse 68, temperature 98.5 °F (36.9 °C), temperature source Oral, resp. rate 20, height 172.7 cm (68\"), weight 59.5 kg (131 lb 2.8 oz), SpO2 99 %, not currently breastfeeding.    HEENT: Normal HEENT exam.    Lungs:  Normal effort and normal respiratory rate.  Breath sounds clear to auscultation.    Heart: Normal rate.  Regular rhythm.  S1 normal and S2 normal.  No " murmur.   Chest: Symmetric chest wall expansion.   Abdomen: Abdomen is soft.  Bowel sounds are normal.   There is no abdominal tenderness.     Extremities: Normal range of motion.  There is no dependent edema.    Neurological: Patient is alert and oriented to person, place and time.    Pupils:  Pupils are equal, round, and reactive to light.  Pupils are equal.   Skin:  Warm.              Results from last 7 days   Lab Units 04/01/23  0503 03/31/23  0838 03/31/23  0452 03/30/23 1957 03/30/23  1233   WBC 10*3/mm3 9.47  --  12.52*  --  8.79   HEMOGLOBIN g/dL 11.2* 10.1* 10.4*   < > 13.3   PLATELETS 10*3/mm3 321  --  325  --  405    < > = values in this interval not displayed.     Results from last 7 days   Lab Units 04/01/23  0503 03/31/23  0838 03/31/23  0452 03/30/23  2208   SODIUM mmol/L 145 143 144  144 144   POTASSIUM mmol/L 4.1 4.5 3.8  3.8 2.9*   CO2 mmol/L 23.0 16.0* 20.0*  20.0* 17.0*   BUN mg/dL 14 17 19  19 26*   CREATININE mg/dL 1.24* 1.01* 1.17*  1.17* 1.29*   MAGNESIUM mg/dL  --  3.5* 3.2* 1.4*   PHOSPHORUS mg/dL 4.0 2.5 0.7* 1.2*   GLUCOSE mg/dL 179* 185* 112*  112* 216*     Estimated Creatinine Clearance: 43.1 mL/min (A) (by C-G formula based on SCr of 1.24 mg/dL (H)).    Results from last 7 days   Lab Units 03/30/23  1632   PH, ARTERIAL pH units 7.451*   PCO2, ARTERIAL mm Hg 24.6*   PO2 ART mm Hg 112.0*       Assessment & Plan   Impression        DKA (diabetic ketoacidoses)    Acute kidney injury (HCC)    Hypertensive urgency    Uncontrolled type 1 diabetes mellitus with hyperglycemia    PFO (patent foramen ovale)    Intractable vomiting    Hypokalemia       Plan        We will go ahead and advance her to a regular diet with diabetic restrictions.  Mobilize as tolerated.  If she is able to tolerate the increase in her diet and ambulation today, we will plan for discharge from the hospital later this afternoon.  Orders have been placed appropriately.    Plan of care and goals reviewed with  mulitdisciplinary/antibiotic stewardship team during rounds.   I discussed the patient's findings and my recommendations with patient       Tate Cervantes MD, [facility]  Pulmonology and Critical Care Medicine

## 2023-04-02 LAB
ANION GAP SERPL CALCULATED.3IONS-SCNC: 12 MMOL/L (ref 5–15)
BASOPHILS # BLD AUTO: 0.01 10*3/MM3 (ref 0–0.2)
BASOPHILS NFR BLD AUTO: 0.1 % (ref 0–1.5)
BUN SERPL-MCNC: 18 MG/DL (ref 8–23)
BUN/CREAT SERPL: 9.1 (ref 7–25)
CALCIUM SPEC-SCNC: 8.5 MG/DL (ref 8.6–10.5)
CHLORIDE SERPL-SCNC: 109 MMOL/L (ref 98–107)
CO2 SERPL-SCNC: 22 MMOL/L (ref 22–29)
CREAT SERPL-MCNC: 1.98 MG/DL (ref 0.57–1)
DEPRECATED RDW RBC AUTO: 43.4 FL (ref 37–54)
EGFRCR SERPLBLD CKD-EPI 2021: 27.8 ML/MIN/1.73
EOSINOPHIL # BLD AUTO: 0.01 10*3/MM3 (ref 0–0.4)
EOSINOPHIL NFR BLD AUTO: 0.1 % (ref 0.3–6.2)
ERYTHROCYTE [DISTWIDTH] IN BLOOD BY AUTOMATED COUNT: 13.4 % (ref 12.3–15.4)
GLUCOSE BLDC GLUCOMTR-MCNC: 118 MG/DL (ref 70–130)
GLUCOSE BLDC GLUCOMTR-MCNC: 131 MG/DL (ref 70–130)
GLUCOSE BLDC GLUCOMTR-MCNC: 181 MG/DL (ref 70–130)
GLUCOSE BLDC GLUCOMTR-MCNC: 188 MG/DL (ref 70–130)
GLUCOSE BLDC GLUCOMTR-MCNC: 205 MG/DL (ref 70–130)
GLUCOSE BLDC GLUCOMTR-MCNC: 233 MG/DL (ref 70–130)
GLUCOSE BLDC GLUCOMTR-MCNC: 255 MG/DL (ref 70–130)
GLUCOSE BLDC GLUCOMTR-MCNC: 43 MG/DL (ref 70–130)
GLUCOSE BLDC GLUCOMTR-MCNC: 90 MG/DL (ref 70–130)
GLUCOSE SERPL-MCNC: 165 MG/DL (ref 65–99)
HCT VFR BLD AUTO: 31.5 % (ref 34–46.6)
HGB BLD-MCNC: 10.7 G/DL (ref 12–15.9)
IMM GRANULOCYTES # BLD AUTO: 0.06 10*3/MM3 (ref 0–0.05)
IMM GRANULOCYTES NFR BLD AUTO: 0.8 % (ref 0–0.5)
LYMPHOCYTES # BLD AUTO: 1.65 10*3/MM3 (ref 0.7–3.1)
LYMPHOCYTES NFR BLD AUTO: 22.8 % (ref 19.6–45.3)
MAGNESIUM SERPL-MCNC: 2.1 MG/DL (ref 1.6–2.4)
MCH RBC QN AUTO: 30.3 PG (ref 26.6–33)
MCHC RBC AUTO-ENTMCNC: 34 G/DL (ref 31.5–35.7)
MCV RBC AUTO: 89.2 FL (ref 79–97)
MONOCYTES # BLD AUTO: 0.58 10*3/MM3 (ref 0.1–0.9)
MONOCYTES NFR BLD AUTO: 8 % (ref 5–12)
NEUTROPHILS NFR BLD AUTO: 4.94 10*3/MM3 (ref 1.7–7)
NEUTROPHILS NFR BLD AUTO: 68.2 % (ref 42.7–76)
NRBC BLD AUTO-RTO: 0 /100 WBC (ref 0–0.2)
PLATELET # BLD AUTO: 282 10*3/MM3 (ref 140–450)
PMV BLD AUTO: 10.4 FL (ref 6–12)
POTASSIUM SERPL-SCNC: 3.3 MMOL/L (ref 3.5–5.2)
POTASSIUM SERPL-SCNC: 3.8 MMOL/L (ref 3.5–5.2)
RBC # BLD AUTO: 3.53 10*6/MM3 (ref 3.77–5.28)
SODIUM SERPL-SCNC: 143 MMOL/L (ref 136–145)
WBC NRBC COR # BLD: 7.25 10*3/MM3 (ref 3.4–10.8)

## 2023-04-02 PROCEDURE — 25010000002 HYDRALAZINE PER 20 MG

## 2023-04-02 PROCEDURE — 83735 ASSAY OF MAGNESIUM: CPT | Performed by: INTERNAL MEDICINE

## 2023-04-02 PROCEDURE — 63710000001 INSULIN LISPRO (HUMAN) PER 5 UNITS: Performed by: INTERNAL MEDICINE

## 2023-04-02 PROCEDURE — 25010000002 PROMETHAZINE PER 50 MG: Performed by: INTERNAL MEDICINE

## 2023-04-02 PROCEDURE — 82962 GLUCOSE BLOOD TEST: CPT

## 2023-04-02 PROCEDURE — 85025 COMPLETE CBC W/AUTO DIFF WBC: CPT | Performed by: INTERNAL MEDICINE

## 2023-04-02 PROCEDURE — 63710000001 INSULIN DETEMIR PER 5 UNITS: Performed by: INTERNAL MEDICINE

## 2023-04-02 PROCEDURE — 84132 ASSAY OF SERUM POTASSIUM: CPT

## 2023-04-02 PROCEDURE — 99232 SBSQ HOSP IP/OBS MODERATE 35: CPT | Performed by: INTERNAL MEDICINE

## 2023-04-02 PROCEDURE — 80048 BASIC METABOLIC PNL TOTAL CA: CPT | Performed by: INTERNAL MEDICINE

## 2023-04-02 RX ORDER — DEXTROSE, SODIUM CHLORIDE, SODIUM LACTATE, POTASSIUM CHLORIDE, AND CALCIUM CHLORIDE 5; .6; .31; .03; .02 G/100ML; G/100ML; G/100ML; G/100ML; G/100ML
50 INJECTION, SOLUTION INTRAVENOUS CONTINUOUS
Status: DISCONTINUED | OUTPATIENT
Start: 2023-04-02 | End: 2023-04-03

## 2023-04-02 RX ORDER — POTASSIUM CHLORIDE 750 MG/1
40 CAPSULE, EXTENDED RELEASE ORAL EVERY 4 HOURS
Status: COMPLETED | OUTPATIENT
Start: 2023-04-02 | End: 2023-04-02

## 2023-04-02 RX ORDER — SODIUM CHLORIDE, SODIUM LACTATE, POTASSIUM CHLORIDE, CALCIUM CHLORIDE 600; 310; 30; 20 MG/100ML; MG/100ML; MG/100ML; MG/100ML
125 INJECTION, SOLUTION INTRAVENOUS CONTINUOUS
Status: DISCONTINUED | OUTPATIENT
Start: 2023-04-02 | End: 2023-04-02

## 2023-04-02 RX ADMIN — INSULIN LISPRO 5 UNITS: 100 INJECTION, SOLUTION INTRAVENOUS; SUBCUTANEOUS at 08:45

## 2023-04-02 RX ADMIN — DEXTROSE MONOHYDRATE 25 G: 25 INJECTION, SOLUTION INTRAVENOUS at 15:17

## 2023-04-02 RX ADMIN — PROMETHAZINE HYDROCHLORIDE 12.5 MG: 25 INJECTION INTRAMUSCULAR; INTRAVENOUS at 22:38

## 2023-04-02 RX ADMIN — HYDRALAZINE HYDROCHLORIDE 10 MG: 20 INJECTION INTRAMUSCULAR; INTRAVENOUS at 18:06

## 2023-04-02 RX ADMIN — Medication 5 MG: at 21:17

## 2023-04-02 RX ADMIN — ACETAMINOPHEN 325MG 650 MG: 325 TABLET ORAL at 12:33

## 2023-04-02 RX ADMIN — CARVEDILOL 6.25 MG: 6.25 TABLET, FILM COATED ORAL at 08:35

## 2023-04-02 RX ADMIN — SODIUM CHLORIDE, POTASSIUM CHLORIDE, SODIUM LACTATE AND CALCIUM CHLORIDE 125 ML/HR: 600; 310; 30; 20 INJECTION, SOLUTION INTRAVENOUS at 15:18

## 2023-04-02 RX ADMIN — ATORVASTATIN CALCIUM 80 MG: 40 TABLET, FILM COATED ORAL at 20:55

## 2023-04-02 RX ADMIN — SODIUM CHLORIDE, SODIUM LACTATE, POTASSIUM CHLORIDE, CALCIUM CHLORIDE AND DEXTROSE MONOHYDRATE 100 ML/HR: 5; 600; 310; 30; 20 INJECTION, SOLUTION INTRAVENOUS at 17:47

## 2023-04-02 RX ADMIN — INSULIN LISPRO 5 UNITS: 100 INJECTION, SOLUTION INTRAVENOUS; SUBCUTANEOUS at 12:33

## 2023-04-02 RX ADMIN — ACETAMINOPHEN 325MG 650 MG: 325 TABLET ORAL at 00:56

## 2023-04-02 RX ADMIN — POTASSIUM CHLORIDE 10 MEQ: 10 CAPSULE, COATED, EXTENDED RELEASE ORAL at 15:42

## 2023-04-02 RX ADMIN — POTASSIUM CHLORIDE 40 MEQ: 10 CAPSULE, COATED, EXTENDED RELEASE ORAL at 10:29

## 2023-04-02 RX ADMIN — FLUOXETINE 20 MG: 20 CAPSULE ORAL at 08:34

## 2023-04-02 RX ADMIN — CARVEDILOL 6.25 MG: 6.25 TABLET, FILM COATED ORAL at 17:42

## 2023-04-02 RX ADMIN — RIVAROXABAN 20 MG: 20 TABLET, FILM COATED ORAL at 17:42

## 2023-04-02 RX ADMIN — NICARDIPINE HYDROCHLORIDE 5 MG/HR: 25 INJECTION, SOLUTION INTRAVENOUS at 19:45

## 2023-04-02 RX ADMIN — INSULIN LISPRO 3 UNITS: 100 INJECTION, SOLUTION INTRAVENOUS; SUBCUTANEOUS at 12:33

## 2023-04-02 RX ADMIN — INSULIN DETEMIR 15 UNITS: 100 INJECTION, SOLUTION SUBCUTANEOUS at 08:42

## 2023-04-02 RX ADMIN — PANTOPRAZOLE SODIUM 40 MG: 40 TABLET, DELAYED RELEASE ORAL at 08:35

## 2023-04-02 RX ADMIN — DONEPEZIL HYDROCHLORIDE 5 MG: 5 TABLET ORAL at 20:55

## 2023-04-02 RX ADMIN — TERAZOSIN HYDROCHLORIDE 2 MG: 2 CAPSULE ORAL at 20:55

## 2023-04-02 NOTE — PLAN OF CARE
Goal Outcome Evaluation:              Outcome Evaluation: Pt rested well overnight. No episodes of N/V. Patient up to BSC with assistance and became labile with blood pressure and more lethargic, once back to bed BP normalized and patient became more alert and oriented. Blood sugar obtained, WNL. Adequate UOP. BM x1. Tylenol given x1 prn.

## 2023-04-02 NOTE — PROGRESS NOTES
Intensive Care Follow-up     Hospital:  LOS: 2 days   Ms. Thelma Michael, 62 y.o. female is followed for:   DKA (diabetic ketoacidosis)        Subjective   Interval History:  The chart has been reviewed.  The patient has not been able to eat very much in the past 24 hours.  She states that she gets nauseated and has had some vomiting.  She believes that this is her gastroparesis causing her further trouble.  She does have a stimulator in place although this has not been adjusted upwards due to being admitted to the hospital.  Urinary outflow has been adequate though her creatinine has increased somewhat.  She states that she is very reluctant to try and eat anything currently because of worries about nausea.  She also has been having some trouble with orthostasis and dizziness.    The patient's past medical, surgical and social history were reviewed and updated in Epic as appropriate.        Objective     Infusions:  lactated ringers, 125 mL/hr      Medications:  atorvastatin, 80 mg, Oral, Nightly  carvedilol, 6.25 mg, Oral, BID With Meals  donepezil, 5 mg, Oral, Nightly  FLUoxetine, 20 mg, Oral, Daily  insulin detemir, 15 Units, Subcutaneous, Daily  insulin lispro, 0-7 Units, Subcutaneous, TID AC  Insulin Lispro, 5 Units, Subcutaneous, TID With Meals  pantoprazole, 40 mg, Oral, Daily  potassium chloride, 40 mEq, Oral, Q4H  rivaroxaban, 20 mg, Oral, Daily With Dinner  terazosin, 2 mg, Oral, Nightly        Vital Sign Min/Max for last 24 hours  Temp  Min: 97.9 °F (36.6 °C)  Max: 99.2 °F (37.3 °C)   BP  Min: 78/49  Max: 208/94   Pulse  Min: 58  Max: 87   Resp  Min: 14  Max: 18   SpO2  Min: 68 %  Max: 100 %   No data recorded       Input/Output for last 24 hour shift  04/01 0701 - 04/02 0700  In: 365 [P.O.:300; I.V.:15]  Out: 700 [Urine:700]      Objective:  General Appearance:  In no acute distress and ill-appearing.    Vital signs: (most recent): Blood pressure (!) 208/94, pulse 71, temperature 99.2 °F (37.3  "°C), temperature source Axillary, resp. rate 14, height 172.7 cm (68\"), weight 59.5 kg (131 lb 2.8 oz), SpO2 100 %, not currently breastfeeding.    HEENT: Normal HEENT exam.    Lungs:  Normal effort and normal respiratory rate.  Breath sounds clear to auscultation.    Heart: Normal rate.  Regular rhythm.  S1 normal and S2 normal.  No murmur.   Chest: Symmetric chest wall expansion.   Abdomen: Abdomen is soft.  Bowel sounds are normal.   There is no abdominal tenderness.     Extremities: Normal range of motion.  There is no dependent edema.    Neurological: Patient is alert and oriented to person, place and time.    Pupils:  Pupils are equal, round, and reactive to light.  Pupils are equal.   Skin:  Warm.              Results from last 7 days   Lab Units 04/02/23  0308 04/01/23  0503 03/31/23  0838 03/31/23  0452   WBC 10*3/mm3 7.25 9.47  --  12.52*   HEMOGLOBIN g/dL 10.7* 11.2* 10.1* 10.4*   PLATELETS 10*3/mm3 282 321  --  325     Results from last 7 days   Lab Units 04/02/23  0308 04/01/23  0503 03/31/23  0838 03/31/23  0452   SODIUM mmol/L 143 145 143 144  144   POTASSIUM mmol/L 3.3* 4.1 4.5 3.8  3.8   CO2 mmol/L 22.0 23.0 16.0* 20.0*  20.0*   BUN mg/dL 18 14 17 19  19   CREATININE mg/dL 1.98* 1.24* 1.01* 1.17*  1.17*   MAGNESIUM mg/dL 2.1  --  3.5* 3.2*   PHOSPHORUS mg/dL  --  4.0 2.5 0.7*   GLUCOSE mg/dL 165* 179* 185* 112*  112*     Estimated Creatinine Clearance: 27 mL/min (A) (by C-G formula based on SCr of 1.98 mg/dL (H)).    Results from last 7 days   Lab Units 03/30/23  1632   PH, ARTERIAL pH units 7.451*   PCO2, ARTERIAL mm Hg 24.6*   PO2 ART mm Hg 112.0*         I reviewed the patient's results and images.     Assessment & Plan   Impression        DKA (diabetic ketoacidoses)    Acute kidney injury (HCC)    Hypertensive urgency    Uncontrolled type 1 diabetes mellitus with hyperglycemia    Gastroparesis    PFO (patent foramen ovale)    Intractable vomiting    Hypokalemia       Plan        Clinically " the patient appears to be dry and I think that she would benefit from further IV volume resuscitation.  I am going to start her with Ringer's solution at 125 mL/h.  Antiemesis as necessary and I have encouraged oral intake.  Continue with current glucose control.  We will try to enhance her blood pressure control though I am reluctant to give her long-acting medicines given that she is intermittently hypotensive.  Follow-up labs have been ordered for tomorrow, specifically to follow her creatinine.  Plan to transition to telemetry today.    Plan of care and goals reviewed with mulitdisciplinary/antibiotic stewardship team during rounds.   I discussed the patient's findings and my recommendations with patient and nursing staff       Tate Cervantes MD, [facility]  Pulmonology and Critical Care Medicine

## 2023-04-03 LAB
ANION GAP SERPL CALCULATED.3IONS-SCNC: 10 MMOL/L (ref 5–15)
BASOPHILS # BLD AUTO: 0.01 10*3/MM3 (ref 0–0.2)
BASOPHILS NFR BLD AUTO: 0.2 % (ref 0–1.5)
BUN SERPL-MCNC: 21 MG/DL (ref 8–23)
BUN/CREAT SERPL: 13.9 (ref 7–25)
CALCIUM SPEC-SCNC: 8.7 MG/DL (ref 8.6–10.5)
CHLORIDE SERPL-SCNC: 110 MMOL/L (ref 98–107)
CO2 SERPL-SCNC: 22 MMOL/L (ref 22–29)
CREAT SERPL-MCNC: 1.51 MG/DL (ref 0.57–1)
DEPRECATED RDW RBC AUTO: 42.6 FL (ref 37–54)
EGFRCR SERPLBLD CKD-EPI 2021: 38.4 ML/MIN/1.73
EOSINOPHIL # BLD AUTO: 0.03 10*3/MM3 (ref 0–0.4)
EOSINOPHIL NFR BLD AUTO: 0.5 % (ref 0.3–6.2)
ERYTHROCYTE [DISTWIDTH] IN BLOOD BY AUTOMATED COUNT: 13.3 % (ref 12.3–15.4)
GLUCOSE BLDC GLUCOMTR-MCNC: 107 MG/DL (ref 70–130)
GLUCOSE BLDC GLUCOMTR-MCNC: 126 MG/DL (ref 70–130)
GLUCOSE BLDC GLUCOMTR-MCNC: 146 MG/DL (ref 70–130)
GLUCOSE BLDC GLUCOMTR-MCNC: 147 MG/DL (ref 70–130)
GLUCOSE BLDC GLUCOMTR-MCNC: 162 MG/DL (ref 70–130)
GLUCOSE BLDC GLUCOMTR-MCNC: 193 MG/DL (ref 70–130)
GLUCOSE BLDC GLUCOMTR-MCNC: 193 MG/DL (ref 70–130)
GLUCOSE SERPL-MCNC: 163 MG/DL (ref 65–99)
HCT VFR BLD AUTO: 30.6 % (ref 34–46.6)
HGB BLD-MCNC: 10.2 G/DL (ref 12–15.9)
IMM GRANULOCYTES # BLD AUTO: 0.02 10*3/MM3 (ref 0–0.05)
IMM GRANULOCYTES NFR BLD AUTO: 0.3 % (ref 0–0.5)
LYMPHOCYTES # BLD AUTO: 1.49 10*3/MM3 (ref 0.7–3.1)
LYMPHOCYTES NFR BLD AUTO: 22.8 % (ref 19.6–45.3)
MCH RBC QN AUTO: 29.4 PG (ref 26.6–33)
MCHC RBC AUTO-ENTMCNC: 33.3 G/DL (ref 31.5–35.7)
MCV RBC AUTO: 88.2 FL (ref 79–97)
MONOCYTES # BLD AUTO: 0.44 10*3/MM3 (ref 0.1–0.9)
MONOCYTES NFR BLD AUTO: 6.7 % (ref 5–12)
NEUTROPHILS NFR BLD AUTO: 4.55 10*3/MM3 (ref 1.7–7)
NEUTROPHILS NFR BLD AUTO: 69.5 % (ref 42.7–76)
NRBC BLD AUTO-RTO: 0 /100 WBC (ref 0–0.2)
PLATELET # BLD AUTO: 277 10*3/MM3 (ref 140–450)
PMV BLD AUTO: 10.6 FL (ref 6–12)
POTASSIUM SERPL-SCNC: 3.8 MMOL/L (ref 3.5–5.2)
RBC # BLD AUTO: 3.47 10*6/MM3 (ref 3.77–5.28)
SODIUM SERPL-SCNC: 142 MMOL/L (ref 136–145)
WBC NRBC COR # BLD: 6.54 10*3/MM3 (ref 3.4–10.8)

## 2023-04-03 PROCEDURE — 63710000001 INSULIN LISPRO (HUMAN) PER 5 UNITS: Performed by: INTERNAL MEDICINE

## 2023-04-03 PROCEDURE — 82962 GLUCOSE BLOOD TEST: CPT

## 2023-04-03 PROCEDURE — 99233 SBSQ HOSP IP/OBS HIGH 50: CPT | Performed by: INTERNAL MEDICINE

## 2023-04-03 PROCEDURE — 63710000001 INSULIN DETEMIR PER 5 UNITS

## 2023-04-03 PROCEDURE — 85025 COMPLETE CBC W/AUTO DIFF WBC: CPT | Performed by: INTERNAL MEDICINE

## 2023-04-03 PROCEDURE — 80048 BASIC METABOLIC PNL TOTAL CA: CPT | Performed by: INTERNAL MEDICINE

## 2023-04-03 RX ORDER — PROMETHAZINE HYDROCHLORIDE 12.5 MG/1
12.5 SUPPOSITORY RECTAL EVERY 4 HOURS PRN
Status: DISCONTINUED | OUTPATIENT
Start: 2023-04-03 | End: 2023-04-04 | Stop reason: HOSPADM

## 2023-04-03 RX ORDER — METOCLOPRAMIDE HYDROCHLORIDE 5 MG/ML
10 INJECTION INTRAMUSCULAR; INTRAVENOUS EVERY 6 HOURS
Status: DISCONTINUED | OUTPATIENT
Start: 2023-04-03 | End: 2023-04-03

## 2023-04-03 RX ORDER — MIRTAZAPINE 15 MG/1
7.5 TABLET, FILM COATED ORAL NIGHTLY
Status: DISCONTINUED | OUTPATIENT
Start: 2023-04-03 | End: 2023-04-04 | Stop reason: HOSPADM

## 2023-04-03 RX ORDER — ONDANSETRON 4 MG/1
4 TABLET, FILM COATED ORAL EVERY 4 HOURS PRN
Status: DISCONTINUED | OUTPATIENT
Start: 2023-04-03 | End: 2023-04-04 | Stop reason: HOSPADM

## 2023-04-03 RX ORDER — GABAPENTIN 100 MG/1
200 CAPSULE ORAL 3 TIMES DAILY
Status: DISCONTINUED | OUTPATIENT
Start: 2023-04-03 | End: 2023-04-04 | Stop reason: HOSPADM

## 2023-04-03 RX ORDER — CARVEDILOL 6.25 MG/1
6.25 TABLET ORAL 2 TIMES DAILY WITH MEALS
Status: DISCONTINUED | OUTPATIENT
Start: 2023-04-03 | End: 2023-04-04 | Stop reason: HOSPADM

## 2023-04-03 RX ORDER — METOCLOPRAMIDE 10 MG/1
10 TABLET ORAL EVERY 6 HOURS
Status: DISCONTINUED | OUTPATIENT
Start: 2023-04-03 | End: 2023-04-03

## 2023-04-03 RX ORDER — METOCLOPRAMIDE 10 MG/1
10 TABLET ORAL
Status: DISCONTINUED | OUTPATIENT
Start: 2023-04-03 | End: 2023-04-04 | Stop reason: HOSPADM

## 2023-04-03 RX ORDER — FERROUS SULFATE 325(65) MG
325 TABLET ORAL
Status: DISCONTINUED | OUTPATIENT
Start: 2023-04-04 | End: 2023-04-04 | Stop reason: HOSPADM

## 2023-04-03 RX ORDER — CLONIDINE HYDROCHLORIDE 0.1 MG/1
0.1 TABLET ORAL EVERY 6 HOURS PRN
Status: DISCONTINUED | OUTPATIENT
Start: 2023-04-03 | End: 2023-04-04 | Stop reason: HOSPADM

## 2023-04-03 RX ADMIN — CARVEDILOL 6.25 MG: 6.25 TABLET, FILM COATED ORAL at 08:45

## 2023-04-03 RX ADMIN — CARVEDILOL 6.25 MG: 6.25 TABLET, FILM COATED ORAL at 17:13

## 2023-04-03 RX ADMIN — GABAPENTIN 200 MG: 100 CAPSULE ORAL at 20:30

## 2023-04-03 RX ADMIN — RIVAROXABAN 20 MG: 20 TABLET, FILM COATED ORAL at 17:13

## 2023-04-03 RX ADMIN — INSULIN DETEMIR 5 UNITS: 100 INJECTION, SOLUTION SUBCUTANEOUS at 08:45

## 2023-04-03 RX ADMIN — INSULIN LISPRO 2 UNITS: 100 INJECTION, SOLUTION INTRAVENOUS; SUBCUTANEOUS at 08:45

## 2023-04-03 RX ADMIN — METOCLOPRAMIDE 10 MG: 10 TABLET ORAL at 12:05

## 2023-04-03 RX ADMIN — FLUOXETINE 20 MG: 20 CAPSULE ORAL at 08:45

## 2023-04-03 RX ADMIN — DONEPEZIL HYDROCHLORIDE 5 MG: 5 TABLET ORAL at 20:30

## 2023-04-03 RX ADMIN — MIRTAZAPINE 7.5 MG: 15 TABLET, FILM COATED ORAL at 20:35

## 2023-04-03 RX ADMIN — GABAPENTIN 200 MG: 100 CAPSULE ORAL at 17:13

## 2023-04-03 RX ADMIN — ATORVASTATIN CALCIUM 80 MG: 40 TABLET, FILM COATED ORAL at 20:30

## 2023-04-03 RX ADMIN — TERAZOSIN HYDROCHLORIDE 2 MG: 2 CAPSULE ORAL at 20:30

## 2023-04-03 RX ADMIN — PANTOPRAZOLE SODIUM 40 MG: 40 TABLET, DELAYED RELEASE ORAL at 08:45

## 2023-04-03 RX ADMIN — INSULIN LISPRO 5 UNITS: 100 INJECTION, SOLUTION INTRAVENOUS; SUBCUTANEOUS at 08:45

## 2023-04-03 RX ADMIN — GABAPENTIN 200 MG: 100 CAPSULE ORAL at 12:05

## 2023-04-03 RX ADMIN — Medication 5 MG: at 20:30

## 2023-04-03 RX ADMIN — METOCLOPRAMIDE 10 MG: 10 TABLET ORAL at 17:13

## 2023-04-03 NOTE — PLAN OF CARE
Pt behavior very labile. Pt BP very labile; hypotensive up in chair, on BSC, SBP <80, in bed, symptomatic, and up in chair SBP -180- 200 at times, symptomatic. Hydralazine given. Cardene gtt reordered. BG very labile, 43 - 255.  Pt not eating appropriately/consistently despite education, D50 given once. D5LR started, Insulin adjusted. Tylenol given once. Pt discussed w/ Intensivist and APRN multiple times through shift.  Transfer to Tele, instructions given to RN on 3E to monitor BG Q2H, call 2200 if >150 for two consecutive checks.      Goal Outcome Evaluation:  Plan of Care Reviewed With: patient

## 2023-04-03 NOTE — CASE MANAGEMENT/SOCIAL WORK
Discharge Planning Assessment  [facility]     Patient Name: Thelma Michael  MRN: [id number]  Today's Date: 4/3/2023    Admit Date: 3/30/2023    Plan: Home   Discharge Needs Assessment     Row Name 04/03/23 1513       Living Environment    People in Home child(bronson), adult    Current Living Arrangements home    Potentially Unsafe Housing Conditions unable to assess    Primary Care Provided by self    Provides Primary Care For no one    Family Caregiver if Needed child(bronson), adult    Quality of Family Relationships unable to assess    Able to Return to Prior Arrangements yes       Resource/Environmental Concerns    Resource/Environmental Concerns none    Transportation Concerns none       Food Insecurity    Within the past 12 months, you worried that your food would run out before you got the money to buy more. Patient refu    Within the past 12 months, the food you bought just didn't last and you didn't have money to get more. Patient refu       Transition Planning    Patient/Family Anticipates Transition to home    Patient/Family Anticipated Services at Transition none    Transportation Anticipated family or friend will provide       Discharge Needs Assessment    Equipment Currently Used at Home cane, straight;walker, standard    Concerns to be Addressed denies needs/concerns at this time    Anticipated Changes Related to Illness none               Discharge Plan     Row Name 04/03/23 1515       Plan    Plan Home    Patient/Family in Agreement with Plan yes    Plan Comments Tried to speak with patient in room to initiate discharge planning, patient not feeling well and not talkative.  CM did confirm residence in [place] with son; PCP is Monet Clark; and insurance is [employer] Medicaid.  Patient has cane and walker at home; she denies any HH use.  CM will continue to follow.    Final Discharge Disposition Code 01 - home or self-care              Continued Care and Services - Admitted Since  3/30/2023    Coordination has not been started for this encounter.     Selected Continued Care - Prior Encounters Includes continued care and service providers with selected services from prior encounters from 12/30/2022 to 4/3/2023    Discharged on 2/6/2023 Admission date: 2/3/2023 - Discharge disposition: Home-Health Care c    Home Medical Care     Service Provider Selected Services Address Phone Fax Patient Preferred    [facility] [address] [facility] [id number] [phone] --                    Expected Discharge Date and Time     Expected Discharge Date Expected Discharge Time    Apr 4, 2023          Demographic Summary     Row Name 04/03/23 1441       General Information    Arrived From home    Referral Source admission list    Reason for Consult discharge planning    Preferred Language English       Contact Information    Permission Granted to Share Info With                Functional Status     Row Name 04/03/23 1513       Functional Status    Usual Activity Tolerance moderate    Current Activity Tolerance moderate       Physical Activity    On average, how many days per week do you engage in moderate to strenuous exercise (like a brisk walk)? Patient refu    On average, how many minutes do you engage in exercise at this level? Patient refu       Functional Status, IADL    Medications independent    Meal Preparation independent    Housekeeping independent    Laundry independent    Shopping independent               Psychosocial    No documentation.                Abuse/Neglect    No documentation.                Legal    No documentation.                Substance Abuse    No documentation.                Patient Forms    No documentation.                   Ashlee Zavala RN

## 2023-04-03 NOTE — PROGRESS NOTES
[facility]  PROGRESS NOTE    Patient Name: Thelma Michael  : 1960  MRN: [id number]    Date of Admission: 3/30/2023  Primary Care Physician: Monet Howe APRN    Subjective   Subjective     CC:  Follow-up further with gastroparesis and DKA    HPI:  I have seen and evaluated the patient this morning.  Comfortable in bed.  Still having some nausea.  Able to tolerate clears.  Would like to try GI soft.  Hoping to be able to go home soon.  She reported that she has an appointment in [place] on the  of this month to upgrade the voltage of her gastric stimulator    ROS:  General : no fevers, chills  CVS: No chest pain, palpitations  Respiratory: No cough, dyspnea  GI:++ Nausea.  No vomiting or diarrhea  10 point review of systems is negative except for what is mentioned in HPI    Objective   Objective     Vital Signs:   Temp:  [94.6 °F (34.8 °C)-99.6 °F (37.6 °C)] 98.7 °F (37.1 °C)  Heart Rate:  [52-93] 63  Resp:  [18-22] 18  BP: ()/() 129/61     Physical Exam:  General: Chronically looking, appears comfortable, not in distress, conversant and cooperative  Head: Atraumatic and normocephalic  Eyes: No Icterus. No pallor  Ears:  Ears appear intact with no abnormalities noted  Throat: No oral lesions, no thrush  Neck: Supple, trachea midline  Lungs: Clear to auscultation bilaterally, equal air entry, no wheezing or crackles  Heart:  Normal S1 and S2, no murmur, no gallop, No JVD, no lower extremity swelling  Abdomen:  Soft, no tenderness, no organomegaly, normal bowel sounds, no organomegaly  Extremities: pulses equal bilaterally  Skin: No bleeding, bruising or rash, normal skin turgor and elasticity  Neurologic: Cranial nerves appear intact with no evidence of facial asymmetry, normal motor and sensory functions in all 4 extremities  Psych: Alert and oriented x 3, normal mood    Results Reviewed:  LAB RESULTS:      Lab 23  0603  04/02/23  0308 04/01/23  0503 03/31/23  0838 03/31/23  0452 03/30/23 2208 03/30/23 1957 03/30/23 1617 03/30/23 1316 03/30/23  1233   WBC 6.54 7.25 9.47  --  12.52*  --   --   --   --  8.79   HEMOGLOBIN 10.2* 10.7* 11.2* 10.1* 10.4*  --  11.3*  --   --  13.3   HEMATOCRIT 30.6* 31.5* 33.8* 29.0* 30.6*  --  32.8*  --   --  38.3   PLATELETS 277 282 321  --  325  --   --   --   --  405   NEUTROS ABS 4.55 4.94  --   --  10.04*  --   --   --   --  6.80   IMMATURE GRANS (ABS) 0.02 0.06*  --   --  0.07*  --   --   --   --  0.04   LYMPHS ABS 1.49 1.65  --   --  1.43  --   --   --   --  1.54   MONOS ABS 0.44 0.58  --   --  0.97*  --   --   --   --  0.39   EOS ABS 0.03 0.01  --   --  0.00  --   --   --   --  0.00   MCV 88.2 89.2 89.7  --  87.9  --   --   --   --  86.8   LACTATE  --   --   --   --  1.2 2.7* 4.0* 4.3* 6.0*  --          Lab 04/03/23  0603 04/02/23  1756 04/02/23 0308 04/01/23  0503 03/31/23  0838 03/31/23 0452 03/30/23 2208 03/30/23 1957 03/30/23  1316 03/30/23  1233   SODIUM 142  --  143 145 143 144  144 144 150*   < >  --    POTASSIUM 3.8 3.8 3.3* 4.1 4.5 3.8  3.8 2.9* 2.9*   < >  --    CHLORIDE 110*  --  109* 109* 113* 114*  114* 108* 114*   < >  --    CO2 22.0  --  22.0 23.0 16.0* 20.0*  20.0* 17.0* 21.0*   < >  --    ANION GAP 10.0  --  12.0 13.0 14.0 10.0  10.0 19.0* 15.0   < >  --    BUN 21  --  18 14 17 19  19 26* 26*   < >  --    CREATININE 1.51*  --  1.98* 1.24* 1.01* 1.17*  1.17* 1.29* 1.36*   < >  --    EGFR 38.4*  --  27.8* 48.7* 62.3 52.2*  52.2* 46.4* 43.6*   < >  --    GLUCOSE 163*  --  165* 179* 185* 112*  112* 216* 209*   < >  --    CALCIUM 8.7  --  8.5* 9.0 8.5* 8.7  8.7 8.8 8.7   < >  --    MAGNESIUM  --   --  2.1  --  3.5* 3.2* 1.4* 1.5*   < >  --    PHOSPHORUS  --   --   --  4.0 2.5 0.7* 1.2* 1.5*   < >  --    HEMOGLOBIN A1C  --   --   --   --   --   --   --   --   --  8.00*    < > = values in this interval not displayed.         Lab 03/31/23  0452 03/30/23  0188   TOTAL  PROTEIN 6.3 7.7   ALBUMIN 3.7 4.3   GLOBULIN 2.6 3.4   ALT (SGPT) 7 10   AST (SGOT) 17 16   BILIRUBIN 0.5 0.7   ALK PHOS 82 109   LIPASE  --  22         Lab 03/30/23  1617 03/30/23  1316   HSTROP T 23* 31*             Lab 03/30/23  1318   ABO TYPING B   RH TYPING Positive   ANTIBODY SCREEN Negative         Lab 03/30/23  1632   PH, ARTERIAL 7.451*   PCO2, ARTERIAL 24.6*   PO2 .0*   FIO2 21   HCO3 ART 17.1*   BASE EXCESS ART -5.3*   CARBOXYHEMOGLOBIN 1.0     Brief Urine Lab Results  (Last result in the past 365 days)      Color   Clarity   Blood   Leuk Est   Nitrite   Protein   CREAT   Urine HCG        03/30/23 1827 Yellow   Clear   Trace   Negative   Negative   100 mg/dL (2+)                 Microbiology Results Abnormal     None          No radiology results from the last 24 hrs    Results for orders placed during the hospital encounter of 12/06/22    Adult Transthoracic Echo Complete W/ Cont if Necessary Per Protocol    Interpretation Summary  •  Left ventricular ejection fraction appears to be greater than 70%.  •  Left ventricular wall thickness is consistent with moderate concentric hypertrophy.  •  Left ventricular diastolic function is consistent with (grade I) impaired relaxation.  •  Estimated right ventricular systolic pressure from tricuspid regurgitation is mildly elevated (35-45 mmHg). Calculated right ventricular systolic pressure from tricuspid regurgitation is 39 mmHg.      Current medications:  Scheduled Meds:atorvastatin, 80 mg, Oral, Nightly  carvedilol, 6.25 mg, Oral, BID With Meals  donepezil, 5 mg, Oral, Nightly  ferric gluconate, 250 mg, Intravenous, Daily  FLUoxetine, 20 mg, Oral, Daily  insulin detemir, 5 Units, Subcutaneous, Daily  insulin lispro, 0-7 Units, Subcutaneous, TID AC  Insulin Lispro, 5 Units, Subcutaneous, TID With Meals  pantoprazole, 40 mg, Oral, Daily  rivaroxaban, 20 mg, Oral, Daily With Dinner  terazosin, 2 mg, Oral, Nightly      Continuous Infusions:dextrose 5 % and  lactated Ringer's, 50 mL/hr, Last Rate: 50 mL/hr (04/02/23 2205)  niCARdipine, 5-15 mg/hr, Last Rate: Stopped (04/02/23 2324)      PRN Meds:.•  acetaminophen  •  dextrose  •  dextrose  •  hydrALAZINE  •  Magnesium Standard Dose Replacement - Follow Nurse / BPA Driven Protocol  •  melatonin  •  Phosphorus Replacement - Follow Nurse / BPA Driven Protocol  •  Potassium Replacement - Follow Nurse / BPA Driven Protocol  •  promethazine  •  sodium chloride    Assessment & Plan   Assessment & Plan     Active Hospital Problems    Diagnosis  POA   • **DKA (diabetic ketoacidoses) [E11.10]  Unknown   • Intractable vomiting [R11.10]  Yes   • Hypokalemia [E87.6]  Unknown   • PFO (patent foramen ovale) [Q21.12]  Not Applicable   • Gastroparesis [K31.84]  Yes   • Uncontrolled type 1 diabetes mellitus with hyperglycemia [E10.65]  Yes   • Acute kidney injury (HCC) [N17.9]  Unknown   • Hypertensive urgency [I16.0]  Yes      Resolved Hospital Problems   No resolved problems to display.        Brief Hospital Course to date:  Thelma Michael is a 62 year-old female with PMH of T1DM, Afib on Xarelto, iron deficiency anemia, gastroparesis, hyperlipidemia, HTN, migraines, self-catheterizations of bladder, strokes (chronic right lentiform nucleus and left cerebellar lacunar infarcts found on MRI in February of 2023), tobacco abuse, and GERD that presented to [facility] on 3/3023 for nausea, vomiting, and abdominal pain for 48 hours.  She was found to have DKA and diabetic gastroparesis    Assessment and plan:  DKA, resolved  Diabetic gastroparesis with intractable nausea and vomiting  Elevated lactate, improved  · Patient developed severe diabetic gastroparesis resulting in intractable nausea and vomiting and dehydration.  Stopped using her insulin pump which resulted in diabetic ketoacidosis  · Admitted to the intensive care unit and was started on DKA protocol and improved  · Lactic acid improved.  Lactic acidosis likely secondary to dehydration  and severe DKA rather than infectious etiology.  Monitor off antibiotics  · Gastroparesis seems to be improving.  We will start her on scheduled dose Reglan 10 mg 3 times daily  · Patient has an appointment with gastroparesis clinic in [place] on 4/20/2023.  Plan to upgrade her voltage on her gastric stimulator to 10 V from 2.5 currently  · Continue Zofran p.o. and rectal Phenergan as needed for nausea  · I counseled the patient about the importance not to stop her insulin on her own to prevent DKA in the future.  She voiced understanding  · For now I will have her on insulin Levemir 8 units daily.  Stop Premeal insulin and continue insulin sliding scale  · A1c 8%     Coffee-ground emesis, likely secondary to gastritis  · Had 1 episode of coffee-ground emesis on admission  · Likely secondary to gastritis  · No abdominal pain to indicate an ulcer  · Started on PPI twice daily  · Came out of ICU with Xarelto.  No evidence of further GI bleed.  Hemoglobin has been stable.  Continue to monitor for now.     Hypertensive crisis  · Uncontrolled hypertension on admission is felt to be secondary to severe stress   · Initially required nicardipine drip while in the intensive care unit.  Currently blood pressure is controlled on oral antibiotics medications    RAFAT  Hypokalemia  · RAFAT likely prerenal secondary to severe dehydration  · Improving.  · Encourage p.o. intake        Total time spent: 50 minutes  Time spent includes time reviewing chart, face-to-face time, counseling patient/family/caregiver, ordering medications/tests/procedures, communicating with other health care professionals, documenting clinical information in the electronic health record, and coordination of care.       Expected Discharge Location and Transportation: Home  Expected Discharge:  Expected Discharge Date and Time     Expected Discharge Date Expected Discharge Time    Apr 4, 2023            DVT prophylaxis:  Medical and mechanical DVT  prophylaxis orders are present.          CODE STATUS:   There are no questions and answers to display.       Shirin Swenson MD  04/03/23

## 2023-04-04 ENCOUNTER — READMISSION MANAGEMENT (OUTPATIENT)
Dept: CALL CENTER | Facility: HOSPITAL | Age: 63
End: 2023-04-04
Payer: COMMERCIAL

## 2023-04-04 VITALS
OXYGEN SATURATION: 100 % | HEART RATE: 80 BPM | BODY MASS INDEX: 19.88 KG/M2 | WEIGHT: 131.17 LBS | RESPIRATION RATE: 18 BRPM | TEMPERATURE: 98.4 F | SYSTOLIC BLOOD PRESSURE: 128 MMHG | HEIGHT: 68 IN | DIASTOLIC BLOOD PRESSURE: 76 MMHG

## 2023-04-04 LAB
GLUCOSE BLDC GLUCOMTR-MCNC: 126 MG/DL (ref 70–130)
GLUCOSE BLDC GLUCOMTR-MCNC: 233 MG/DL (ref 70–130)

## 2023-04-04 PROCEDURE — 99238 HOSP IP/OBS DSCHRG MGMT 30/<: CPT | Performed by: INTERNAL MEDICINE

## 2023-04-04 PROCEDURE — 97161 PT EVAL LOW COMPLEX 20 MIN: CPT

## 2023-04-04 PROCEDURE — 63710000001 INSULIN DETEMIR PER 5 UNITS: Performed by: INTERNAL MEDICINE

## 2023-04-04 PROCEDURE — 97530 THERAPEUTIC ACTIVITIES: CPT

## 2023-04-04 PROCEDURE — 63710000001 INSULIN LISPRO (HUMAN) PER 5 UNITS: Performed by: INTERNAL MEDICINE

## 2023-04-04 PROCEDURE — 82962 GLUCOSE BLOOD TEST: CPT

## 2023-04-04 RX ORDER — PROMETHAZINE HYDROCHLORIDE 12.5 MG/1
12.5 SUPPOSITORY RECTAL EVERY 4 HOURS PRN
Qty: 30 SUPPOSITORY | Refills: 0 | Status: SHIPPED | OUTPATIENT
Start: 2023-04-04

## 2023-04-04 RX ORDER — ONDANSETRON 4 MG/1
4 TABLET, FILM COATED ORAL EVERY 4 HOURS PRN
Qty: 60 TABLET | Refills: 0 | Status: SHIPPED | OUTPATIENT
Start: 2023-04-04 | End: 2023-05-04

## 2023-04-04 RX ORDER — METOCLOPRAMIDE 10 MG/1
10 TABLET ORAL
Qty: 90 TABLET | Refills: 0 | Status: SHIPPED | OUTPATIENT
Start: 2023-04-04 | End: 2023-05-04

## 2023-04-04 RX ADMIN — INSULIN DETEMIR 8 UNITS: 100 INJECTION, SOLUTION SUBCUTANEOUS at 08:50

## 2023-04-04 RX ADMIN — FLUOXETINE 20 MG: 20 CAPSULE ORAL at 08:50

## 2023-04-04 RX ADMIN — GABAPENTIN 200 MG: 100 CAPSULE ORAL at 08:50

## 2023-04-04 RX ADMIN — FERROUS SULFATE TAB 325 MG (65 MG ELEMENTAL FE) 325 MG: 325 (65 FE) TAB at 08:50

## 2023-04-04 RX ADMIN — METOCLOPRAMIDE 10 MG: 10 TABLET ORAL at 08:50

## 2023-04-04 RX ADMIN — CARVEDILOL 6.25 MG: 6.25 TABLET, FILM COATED ORAL at 08:50

## 2023-04-04 RX ADMIN — INSULIN LISPRO 3 UNITS: 100 INJECTION, SOLUTION INTRAVENOUS; SUBCUTANEOUS at 08:51

## 2023-04-04 RX ADMIN — PANTOPRAZOLE SODIUM 40 MG: 40 TABLET, DELAYED RELEASE ORAL at 08:50

## 2023-04-04 NOTE — OUTREACH NOTE
Prep Survey    Flowsheet Row Responses   [facility] patient discharged from? [place]   Is LACE score < 7 ? No   Eligibility [facility]   Date of Admission 03/30/23   Date of Discharge 04/04/23   Discharge Disposition Home or Self Care   Discharge diagnosis DKA (diabetic ketoacidoses   Does the patient have one of the following disease processes/diagnoses(primary or secondary)? Other   Does the patient have Home health ordered? No   Is there a DME ordered? No   Prep survey completed? Yes          Alejandra BIRCH - Registered Nurse

## 2023-04-04 NOTE — DISCHARGE SUMMARY
At    [facility]  DISCHARGE SUMMARY    Patient Name: Thelma Michael  : 1960  MRN: [id number]    Date of Admission: 3/30/2023 11:43 AM  Date of Discharge: 2023  Primary Care Physician: Monet Howe APRN    Consults     No orders found from 3/1/2023 to 3/31/2023.          Hospital Course     Presenting Problem:   Intractable vomiting [R11.10]    Active Hospital Problems    Diagnosis  POA   • **DKA (diabetic ketoacidoses) [E11.10]  Unknown   • Intractable vomiting [R11.10]  Yes   • Hypokalemia [E87.6]  Unknown   • PFO (patent foramen ovale) [Q21.12]  Not Applicable   • Gastroparesis [K31.84]  Yes   • Uncontrolled type 1 diabetes mellitus with hyperglycemia [E10.65]  Yes   • Acute kidney injury (HCC) [N17.9]  Unknown   • Hypertensive urgency [I16.0]  Yes      Resolved Hospital Problems   No resolved problems to display.          Hospital Course:  Thelma Michael is a 62 year-old female with PMH of T1DM, Afib on Xarelto, iron deficiency anemia, gastroparesis, hyperlipidemia, HTN, migraines, self-catheterizations of bladder, strokes (chronic right lentiform nucleus and left cerebellar lacunar infarcts found on MRI in 2023), tobacco abuse, and GERD that presented to [facility] on 3/3023 for nausea, vomiting, and abdominal pain for 48 hours.  She was found to have DKA and diabetic gastroparesis.  Admitted to intensive care unit and treated with IV insulin and IV fluids per DKA protocol.  DKA resolved. Likely precipitating factor for her DKA as dehydration from diabetic gastroparesis and lack of insulin administration (had some issues with the insulin pump and quit using it) and was not injecting insulin at all.  Gastroparesis treated with IV Reglan and discharged on p.o. form.  She has an appointment coming with the gastroparesis clinic in [place] on 2023 to further adjust her gastric stimulator.  Patient was advised to continue to take  insulin glargine 10 units daily till she sees her family doctor to address her insulin pump issue and I gave her a new prescription.  Overall improved and returned to baseline and was discharged in a stable condition     DKA, resolved  Diabetic gastroparesis with intractable nausea and vomiting  Elevated lactate, improved  • Patient developed severe diabetic gastroparesis resulting in intractable nausea and vomiting and dehydration.  Had some issues with her insulin pump and quit using it and has not been injecting herself with insulin both probably contributed to her DKA  • Admitted to the ICU and treated per DKA protocol.  DKA resolved and was stepdown to medical floor  • Gastroparesis improved with Reglan.  Prescription given upon discharge.  She has follow-up appointment with her gastroparesis clinic in [place] on 4/20/2023 to adjust her gastric stimulator  • Blood sugar was controlled with insulin Levemir 10 units daily.  I gave a prescription of that till she sees her family doctor to address her concerns about her insulin pump  • Lactic acid improved.  Lactic acidosis likely secondary to dehydration and severe DKA rather than infectious etiology.  Monitor off antibiotics  • Discharged with prescription for Zofran p.o. and rectal Phenergan as needed for nausea  • I counseled the patient about the importance not to stop her insulin on her own to prevent DKA  • A1c 8%     Hypertensive crisis  • Uncontrolled hypertension on admission is felt to be secondary to severe stress   • Initially required nicardipine drip while in the intensive care unit.  Currently blood pressure is controlled on oral antihypertensive medications     RAAFT, improving  Hypokalemia  • RAFAT likely prerenal secondary to severe dehydration  • Encourage p.o. intake         Discharge Follow Up Recommendations for outpatient labs/diagnostics:  PCP in 1 week    Day of Discharge     HPI:   I have seen and evaluated the patient this morning.   Comfortable.  Nausea resolved.  Able to tolerate food.  Again counseled about the importance to keep yourself hydrated and take her insulin.  I sent a prescription for insulin Levemir till she get her insulin pump issue sorted out with her family doctor Otherwise she has no acute complaints and wanting to go home    Review of Systems  General : no fevers, chills  CVS: No chest pain, palpitations  Respiratory: No cough, dyspnea  GI: No N/V/D, abd pain  10 point review of systems is negative except for what is mentioned in HPI    Vital Signs:   Temp:  [97.6 °F (36.4 °C)-98.9 °F (37.2 °C)] 98.4 °F (36.9 °C)  Heart Rate:  [57-80] 80  Resp:  [16-20] 18  BP: (116-162)/(63-84) 128/76      Physical Exam:  General: Chronically ill looking, not in distress, conversant and cooperative  Head: Atraumatic and normocephalic  Eyes: No Icterus. No pallor  Ears:  Ears appear intact with no abnormalities noted  Throat: No oral lesions, no thrush  Neck: Supple, trachea midline  Lungs: Clear to auscultation bilaterally, equal air entry, no wheezing or crackles  Heart:  Normal S1 and S2, no murmur, no gallop, No JVD, no lower extremity swelling  Abdomen:  Soft, no tenderness, no organomegaly, normal bowel sounds, no organomegaly  Extremities: pulses equal bilaterally  Skin: No bleeding, bruising or rash, normal skin turgor and elasticity  Neurologic: Cranial nerves appear intact with no evidence of facial asymmetry, normal motor and sensory functions in all 4 extremities  Psych: Alert and oriented x 3, normal mood    Pertinent  and/or Most Recent Results     LAB RESULTS:      Lab 04/03/23  0603 04/02/23  0308 04/01/23  0503 03/31/23  0838 03/31/23  0452 03/30/23  2208 03/30/23  1957 03/30/23  1617 03/30/23  1316 03/30/23  1233   WBC 6.54 7.25 9.47  --  12.52*  --   --   --   --  8.79   HEMOGLOBIN 10.2* 10.7* 11.2* 10.1* 10.4*  --  11.3*  --   --  13.3   HEMATOCRIT 30.6* 31.5* 33.8* 29.0* 30.6*  --  32.8*  --   --  38.3   PLATELETS 277  282 321  --  325  --   --   --   --  405   NEUTROS ABS 4.55 4.94  --   --  10.04*  --   --   --   --  6.80   IMMATURE GRANS (ABS) 0.02 0.06*  --   --  0.07*  --   --   --   --  0.04   LYMPHS ABS 1.49 1.65  --   --  1.43  --   --   --   --  1.54   MONOS ABS 0.44 0.58  --   --  0.97*  --   --   --   --  0.39   EOS ABS 0.03 0.01  --   --  0.00  --   --   --   --  0.00   MCV 88.2 89.2 89.7  --  87.9  --   --   --   --  86.8   LACTATE  --   --   --   --  1.2 2.7* 4.0* 4.3* 6.0*  --          Lab 04/03/23  0603 04/02/23  1756 04/02/23  0308 04/01/23  0503 03/31/23  0838 03/31/23  0452 03/30/23  2208 03/30/23  1957 03/30/23  1316 03/30/23  1233   SODIUM 142  --  143 145 143 144  144 144 150*   < >  --    POTASSIUM 3.8 3.8 3.3* 4.1 4.5 3.8  3.8 2.9* 2.9*   < >  --    CHLORIDE 110*  --  109* 109* 113* 114*  114* 108* 114*   < >  --    CO2 22.0  --  22.0 23.0 16.0* 20.0*  20.0* 17.0* 21.0*   < >  --    ANION GAP 10.0  --  12.0 13.0 14.0 10.0  10.0 19.0* 15.0   < >  --    BUN 21  --  18 14 17 19  19 26* 26*   < >  --    CREATININE 1.51*  --  1.98* 1.24* 1.01* 1.17*  1.17* 1.29* 1.36*   < >  --    EGFR 38.4*  --  27.8* 48.7* 62.3 52.2*  52.2* 46.4* 43.6*   < >  --    GLUCOSE 163*  --  165* 179* 185* 112*  112* 216* 209*   < >  --    CALCIUM 8.7  --  8.5* 9.0 8.5* 8.7  8.7 8.8 8.7   < >  --    MAGNESIUM  --   --  2.1  --  3.5* 3.2* 1.4* 1.5*   < >  --    PHOSPHORUS  --   --   --  4.0 2.5 0.7* 1.2* 1.5*   < >  --    HEMOGLOBIN A1C  --   --   --   --   --   --   --   --   --  8.00*    < > = values in this interval not displayed.         Lab 03/31/23  0452 03/30/23  1316   TOTAL PROTEIN 6.3 7.7   ALBUMIN 3.7 4.3   GLOBULIN 2.6 3.4   ALT (SGPT) 7 10   AST (SGOT) 17 16   BILIRUBIN 0.5 0.7   ALK PHOS 82 109   LIPASE  --  22         Lab 03/30/23  1617 03/30/23  1316   HSTROP T 23* 31*             Lab 03/30/23  1318   ABO TYPING B   RH TYPING Positive   ANTIBODY SCREEN Negative         Lab 03/30/23  1632   PH, ARTERIAL 7.451*    PCO2, ARTERIAL 24.6*   PO2 .0*   FIO2 21   HCO3 ART 17.1*   BASE EXCESS ART -5.3*   CARBOXYHEMOGLOBIN 1.0     Brief Urine Lab Results  (Last result in the past 365 days)      Color   Clarity   Blood   Leuk Est   Nitrite   Protein   CREAT   Urine HCG        03/30/23 1827 Yellow   Clear   Trace   Negative   Negative   100 mg/dL (2+)               Microbiology Results (last 10 days)     ** No results found for the last 240 hours. **          CT Abdomen Pelvis Without Contrast    Result Date: 3/30/2023  CT ABDOMEN PELVIS WO CONTRAST Date of Exam: 3/30/2023 2:34 PM EDT Indication: abd pain, vomiting. Comparison: 2/4/2023 Technique: Axial CT images were obtained of the abdomen and pelvis without the administration of contrast. Reconstructed coronal and sagittal images were also obtained. Automated exposure control and iterative construction methods were used. Findings: The lung bases are clear. Evaluation of the body wall soft tissues demonstrates no acute or suspicious findings. The osseous structures demonstrate no evidence of acute fracture or aggressive osseous lesion. The liver, spleen, pancreas and bilateral adrenal glands appear similar to comparison, nonspecific bilateral low-density adrenal nodules present, incompletely characterized. The kidneys demonstrate no evidence of stones or hydronephrosis and the ureters are not dilated. There is redemonstrated abnormal circumferential wall thickening of the bladder, with a large superiorly projecting diverticulum noted. There is no free fluid or pneumoperitoneum. A right abdominal wall generator is now present, with leads terminating along the distal stomach. Atherosclerotic nonaneurysmal abdominal aorta. Unremarkable gallbladder. Colonic diverticulosis is present, without specific acute inflammatory size diverticulitis. The appendix is normal. There is no retroperitoneal lymphadenopathy. The pelvic viscera demonstrate no additional acute findings.      Impression: Stable CT appearance of the abdomen and pelvis as above, including circumferential wall thickening of the urinary bladder with a large superior projecting diverticulum present. There is no evidence of obstructive nephropathy. Colonic diverticulosis changes are present without evidence of diverticulitis. Postoperative changes are present from apparent interval stimulator placement, with generator along the right lower abdomen and leads visualized terminating along the stomach. Electronically Signed: Arya Muñoz  3/30/2023 3:47 PM EDT  Workstation ID: [id number]    XR Chest 2 View    Result Date: 3/30/2023  XR CHEST 2 VW Date of Exam: 3/30/2023 3:38 PM EDT Indication: DKA. Comparison: 12/7/2022 Findings: The lungs are clear bilaterally. Pleural spaces are normal. Cardiac and mediastinal contours are within normal limits. Regional skeleton is within normal limits for age.     Impression: 1. No acute cardiopulmonary disease. Electronically Signed: Skip Valente  3/30/2023 4:01 PM EDT  Workstation ID: [id number]              Results for orders placed during the hospital encounter of 12/06/22    Adult Transthoracic Echo Complete W/ Cont if Necessary Per Protocol    Interpretation Summary  •  Left ventricular ejection fraction appears to be greater than 70%.  •  Left ventricular wall thickness is consistent with moderate concentric hypertrophy.  •  Left ventricular diastolic function is consistent with (grade I) impaired relaxation.  •  Estimated right ventricular systolic pressure from tricuspid regurgitation is mildly elevated (35-45 mmHg). Calculated right ventricular systolic pressure from tricuspid regurgitation is 39 mmHg.      Plan for Follow-up of Pending Labs/Results:     Discharge Details        Discharge Medications      New Medications      Instructions Start Date   insulin detemir 100 UNIT/ML injection  Commonly known as: LEVEMIR   10 Units, Subcutaneous, Daily      metoclopramide 10 MG  tablet  Commonly known as: REGLAN   10 mg, Oral, 3 Times Daily Before Meals      ondansetron 4 MG tablet  Commonly known as: ZOFRAN   4 mg, Oral, Every 4 Hours PRN      promethazine 12.5 MG suppository  Commonly known as: PHENERGAN   12.5 mg, Rectal, Every 4 Hours PRN         Continue These Medications      Instructions Start Date   acetaminophen 325 MG tablet  Commonly known as: TYLENOL   650 mg, Oral, Every 4 Hours PRN      albuterol sulfate  (90 Base) MCG/ACT inhaler  Commonly known as: PROVENTIL HFA;VENTOLIN HFA;PROAIR HFA   2 puffs, Inhalation, Every 4 Hours PRN      atorvastatin 80 MG tablet  Commonly known as: LIPITOR   80 mg, Oral, Nightly      calcium carbonate 500 MG chewable tablet  Commonly known as: TUMS   2 tablets, Oral, 3 Times Daily PRN      carvedilol 6.25 MG tablet  Commonly known as: COREG   6.25 mg, Oral, 2 Times Daily With Meals      Dexcom G6 Sensor   USE ONE SENSOR EVERY 10 DAYS      Dexcom G6 Sensor   Does not apply, Every 10 Days      Dexcom G6 Transmitter misc   1 each, Does not apply, Every 3 Months      diphenhydrAMINE 25 MG tablet  Commonly known as: BENADRYL   25 mg, Oral, Every 6 Hours PRN      donepezil 5 MG tablet  Commonly known as: Aricept   5 mg, Oral, Nightly      doxazosin 2 MG tablet  Commonly known as: CARDURA   2 mg, Oral, Nightly      ferrous sulfate 325 (65 Fe) MG tablet   TAKE 1 TABLET BY MOUTH ONCE DAILY WITH  BREAKFAST  **TAKE  WITH  ORANGE  JUICE  OR  VITAMIN  C**      FLUoxetine 20 MG capsule  Commonly known as: PROzac   20 mg, Oral, Daily      glucose monitor monitoring kit   1 each, Does not apply, 4 Times Daily      melatonin 5 MG tablet tablet   5 mg, Oral, Nightly PRN      mirtazapine 7.5 MG tablet  Commonly known as: REMERON   7.5 mg, Oral, Nightly      Multivitamin tablet tablet  Generic drug: multivitamin   Take 1 tablet by mouth once daily      pantoprazole 40 MG EC tablet  Commonly known as: PROTONIX   40 mg, Oral, Daily      rivaroxaban 20 MG  tablet  Commonly known as: XARELTO   20 mg, Oral, Daily With Dinner      sennosides-docusate 8.6-50 MG per tablet  Commonly known as: PERICOLACE   2 tablets, Oral, 2 Times Daily PRN      traMADol 50 MG tablet  Commonly known as: ULTRAM   50 mg, Oral, Every 6 Hours PRN      VITAMIN C PO   Vitamin C TABS      vitamin D 1.25 MG (91665 UT) capsule capsule  Commonly known as: ERGOCALCIFEROL   50,000 Units, Oral, Weekly             No Known Allergies      Discharge Disposition:  Home or Self Care    Diet:  Hospital:  Diet Order   Procedures   • Diet: Gastrointestinal Diets; Fiber-Restricted; Texture: Regular Texture (IDDSI 7); Fluid Consistency: Thin (IDDSI 0)       Activity:  As tolerated    Restrictions or Other Recommendations:  None        CODE STATUS:    Code Status and Medical Interventions:   Ordered at: 04/03/23 1337     Level Of Support Discussed With:    Patient     Code Status (Patient has no pulse and is not breathing):    CPR (Attempt to Resuscitate)     Medical Interventions (Patient has pulse or is breathing):    Full Support     Release to patient:    Routine Release       Future Appointments   Date Time Provider Department Center   5/1/2023  2:15 PM Ludwig Mitchell MD [facility]   7/3/2023  3:30 PM Gerson Ochoa MD [facility]                 Shirin Swenson MD  04/04/23      Time Spent on Discharge:  I spent  29 minutes on this discharge activity which included: face-to-face encounter with the patient, reviewing the data in the system, coordination of the care with the nursing staff as well as consultants, documentation, and entering orders.

## 2023-04-04 NOTE — PLAN OF CARE
Goal Outcome Evaluation:  Plan of Care Reviewed With: patient           Outcome Evaluation: PT eval completed. Patient alert and oriented x3. Presenting with mild deficits in general BLE strength, standing dynamic balance, effecting functional mobility belwo baseline. Patient will benefit from skilled IP PT services to address impairments in order to return to PLOF. Recommend home with assist at dc.

## 2023-04-04 NOTE — THERAPY EVALUATION
Patient Name: Thelma Michael  : 1960    MRN: [id number]                              Today's Date: 2023       Admit Date: 3/30/2023    Visit Dx:     ICD-10-CM ICD-9-CM   1. Intractable vomiting  R11.10 536.2   2. Hematemesis with nausea  K92.0 578.0     787.02   3. Acute kidney injury  N17.9 584.9   4. Hyperglycemia  R73.9 790.29   5. Dehydration  E86.0 276.51   6. Diabetic ketoacidosis without coma associated with other specified diabetes mellitus  E13.10 250.12   7. Atrial fibrillation and flutter  I48.91 427.31    I48.92 427.32     Patient Active Problem List   Diagnosis   • Diabetic peripheral neuropathy (HCC)   • Hyperlipidemia   • Hypertension   • Osteoporosis   • Tobacco use   • Heart valve disease   • Iron deficiency anemia secondary to inadequate dietary iron intake   • Acute kidney injury (HCC)   • DKA (diabetic ketoacidoses)   • Hypertensive urgency   • Uncontrolled type 1 diabetes mellitus with hyperglycemia   • Gastroparesis   • PFO (patent foramen ovale)   • Atrial fibrillation and flutter   • Intractable vomiting   • Hypokalemia     Past Medical History:   Diagnosis Date   • Acid reflux    • Acute bronchitis    • Cardiac murmur    • Depression with anxiety 10/5/2020   • Diabetes mellitus    • Gastroesophageal reflux disease 2016   • H/O echocardiogram 2012    i. LVEF 65%.ii. Mild LVH.iii. Borderline evidence of atrial septal aneurysm.  No PFO.    • History of nuclear stress test 2014    Negative for ischemia and scars; LVEF 77%.     • History of TIAs 7/15/2021   • Hyperlipidemia    • Hypertension    • Impacted cerumen of both ears    • Migraine    • Migraines 10/31/2019   • Self-catheterizes urinary bladder    • Sinusitis    • Stroke    • Tobacco abuse     quit 4 days ago.     • Urticaria    • Vitamin D deficiency 2016     Past Surgical History:   Procedure Laterality Date   • CAPSULE ENDOSCOPY  2021    Procedure: PILLCAM DEPLOYMENT;  Surgeon: Deacon  Mikael Stewart MD;  Location:  [facility];  Service: Gastroenterology;;   • COLONOSCOPY     • COLONOSCOPY N/A 07/27/2021    Procedure: COLONOSCOPY;  Surgeon: Mikael Worthy MD;  Location:  [facility];  Service: Gastroenterology;  Laterality: N/A;   • DENTAL PROCEDURE     • ENDOSCOPY N/A 06/30/2021    Procedure: ESOPHAGOGASTRODUODENOSCOPY;  Surgeon: Brunner, Mark I, MD;  Location:  [facility];  Service: Gastroenterology;  Laterality: N/A;   • ENDOSCOPY N/A 07/27/2021    Procedure: ESOPHAGOGASTRODUODENOSCOPY;  Surgeon: Mikael Worthy MD;  Location:  [facility];  Service: Gastroenterology;  Laterality: N/A;   • ENDOSCOPY WITH JTUBE N/A 03/16/2022    Procedure: ESOPHAGOGASTRODUODENOSCOPY WITH JEJUNAL TUBE INSERTION;  Surgeon: Thom Glover MD;  Location:  [facility];  Service: Gastroenterology;  Laterality: N/A;   • UPPER GASTROINTESTINAL ENDOSCOPY        General Information     Row Name 04/04/23 1141          Physical Therapy Time and Intention    Document Type evaluation  -LO     Mode of Treatment individual therapy;physical therapy  -LO     Row Name 04/04/23 1141          General Information    Patient Profile Reviewed yes  -LO     Prior Level of Function independent:;all household mobility;community mobility;gait;transfer;bed mobility  has a SPC and RW that uses as she feels she needs them  -LO     Existing Precautions/Restrictions fall  -LO     Barriers to Rehab medically complex  -LO     Row Name 04/04/23 1141          Living Environment    People in Home child(bronson), adult  -LO     Row Name 04/04/23 1141          Home Main Entrance    Number of Stairs, Main Entrance ten  -LO     Stair Railings, Main Entrance railings safe and in good condition  -LO     Row Name 04/04/23 1141          Stairs Within Home, Primary    Number of Stairs, Within Home, Primary none  -LO     Row Name 04/04/23 1141          Cognition    Orientation Status (Cognition) oriented x 3  -LO     Row Name  04/04/23 1141          Safety Issues, Functional Mobility    Safety Issues Affecting Function (Mobility) at risk behavior observed;impulsivity;insight into deficits/self-awareness  -LO     Impairments Affecting Function (Mobility) balance;endurance/activity tolerance;strength  -LO           User Key  (r) = Recorded By, (t) = Taken By, (c) = Cosigned By    Initials Name Provider Type    Magali Hurtado, YUMI Physical Therapist               Mobility     Row Name 04/04/23 1143          Bed Mobility    Bed Mobility supine-sit  -LO     Supine-Sit [place] (Bed Mobility) modified independence  -LO     Assistive Device (Bed Mobility) bed rails;head of bed elevated  -LO     Comment, (Bed Mobility) no physical assistance required, extra time for effort  -LO     Row Name 04/04/23 1143          Transfers    Comment, (Transfers) EOB>stand>recliner; SBA no AD vc for slowing to control motion  -LO     Row Name 04/04/23 1143          Bed-Chair Transfer    Bed-Chair [place] (Transfers) standby assist  -LO     Assistive Device (Bed-Chair Transfers) other (see comments)  No AD  -LO     Row Name 04/04/23 1143          Sit-Stand Transfer    Sit-Stand [place] (Transfers) standby assist  -LO     Assistive Device (Sit-Stand Transfers) other (see comments)  no AD  -LO     Row Name 04/04/23 1143          Gait/Stairs (Locomotion)    [place] Level (Gait) contact guard  -LO     Assistive Device (Gait) walker, front-wheeled  -LO     Distance in Feet (Gait) 6  -LO     Deviations/Abnormal Patterns (Gait) bilateral deviations;stride length decreased;gait speed decreased  -LO     Bilateral Gait Deviations forward flexed posture  -LO     Comment, (Gait/Stairs) Ambulates in room bed to chair ( 6') without AD CGA for safety. Mild unsteadiness noted, no loss of balance.  -LO           User Key  (r) = Recorded By, (t) = Taken By, (c) = Cosigned By    Initials Name Provider Type    Magali Hurtado PT Physical Therapist                Obj/Interventions     Row Name 04/04/23 1148          Range of Motion Comprehensive    General Range of Motion bilateral lower extremity ROM WNL  -LO     Row Name 04/04/23 1148          Strength Comprehensive (MMT)    General Manual Muscle Testing (MMT) Assessment lower extremity strength deficits identified  -LO     Comment, General Manual Muscle Testing (MMT) Assessment BLE grossly 3+/5  -LO     Row Name 04/04/23 1148          Motor Skills    Motor Skills functional endurance  -LO     Functional Endurance fair  -LO     Row Name 04/04/23 1148          Balance    Balance Assessment sitting static balance;sitting dynamic balance;standing static balance;standing dynamic balance  -LO     Static Sitting Balance independent  -LO     Dynamic Sitting Balance independent  -LO     Position, Sitting Balance unsupported;sitting edge of bed  -LO     Static Standing Balance standby assist  -LO     Dynamic Standing Balance contact guard  -LO     Position/Device Used, Standing Balance unsupported  -LO     Comment, Balance CGA no AD for safety in standing  -LO     Row Name 04/04/23 1148          Sensory Assessment (Somatosensory)    Sensory Assessment (Somatosensory) sensation intact  -LO           User Key  (r) = Recorded By, (t) = Taken By, (c) = Cosigned By    Initials Name Provider Type    LO Magali Gomez, PT Physical Therapist               Goals/Plan     Row Name 04/04/23 1152          Transfer Goal 1 (PT)    Activity/Assistive Device (Transfer Goal 1, PT) sit-to-stand/stand-to-sit;bed-to-chair/chair-to-bed;car transfer  -LO     [place] Level/Cues Needed (Transfer Goal 1, PT) independent  -LO     Time Frame (Transfer Goal 1, PT) long term goal (LTG);10 days  -LO     Row Name 04/04/23 1152          Gait Training Goal 1 (PT)    Activity/Assistive Device (Gait Training Goal 1, PT) gait (walking locomotion);improve balance and speed;increase endurance/gait distance;diminish gait deviation;decrease fall risk  -LO      [place] Level (Gait Training Goal 1, PT) independent  -LO     Distance (Gait Training Goal 1, PT) 150  -LO     Time Frame (Gait Training Goal 1, PT) long term goal (LTG);10 days  -LO     Row Name 04/04/23 1152          Stairs Goal 1 (PT)    Activity/Assistive Device (Stairs Goal 1, PT) ascending stairs;descending stairs  -LO     [place] Level/Cues Needed (Stairs Goal 1, PT) modified independence  -LO     Number of Stairs (Stairs Goal 1, PT) 12  -LO     Time Frame (Stairs Goal 1, PT) long term goal (LTG);10 days  -LO     Row Name 04/04/23 1152          Therapy Assessment/Plan (PT)    Planned Therapy Interventions (PT) balance training;gait training;home exercise program;patient/family education;neuromuscular re-education;stair training;strengthening;transfer training  -LO           User Key  (r) = Recorded By, (t) = Taken By, (c) = Cosigned By    Initials Name Provider Type    LO Magali Gomez, PT Physical Therapist               Clinical Impression     Row Name 04/04/23 1149          Pain    Pretreatment Pain Rating 0/10 - no pain  -LO     Posttreatment Pain Rating 0/10 - no pain  -LO     Row Name 04/04/23 1149          Plan of Care Review    Plan of Care Reviewed With patient  -LO     Outcome Evaluation PT eval completed. Patient alert and oriented x3. Presenting with mild deficits in general BLE strength, standing dynamic balance, effecting functional mobility belwo baseline. Patient will benefit from skilled IP PT services to address impairments in order to return to [facility]. Recommend home with assist at [place].  -     Row Name 04/04/23 1149          Therapy Assessment/Plan (PT)    Patient/Family Therapy Goals Statement (PT) home  -LO     Rehab Potential (PT) good, to achieve stated therapy goals  -LO     Criteria for Skilled Interventions Met (PT) yes;meets criteria;skilled treatment is necessary  -LO     Therapy Frequency (PT) daily  -LO     Row Name 04/04/23 1142          Vital Signs    Pre Systolic BP  Rehab 128  -LO     Pre Treatment Diastolic BP 76  -LO     Post Systolic BP Rehab 106  -LO     Post Treatment Diastolic BP 70  -LO     Pretreatment Heart Rate (beats/min) 76  -LO     Posttreatment Heart Rate (beats/min) 74  -LO     Pre SpO2 (%) 100  -LO     O2 Delivery Pre Treatment room air  -LO     O2 Delivery Intra Treatment room air  -LO     O2 Delivery Post Treatment room air  -LO     Pre Patient Position Supine  -LO     Intra Patient Position Standing  -LO     Post Patient Position Sitting  -LO     Row Name 04/04/23 1149          Positioning and Restraints    Pre-Treatment Position in bed  -LO     Post Treatment Position chair  -LO     In Chair reclined;notified nsg;call light within reach;encouraged to call for assist;exit alarm on;waffle cushion  -LO           User Key  (r) = Recorded By, (t) = Taken By, (c) = Cosigned By    Initials Name Provider Type    Magali Hurtado, PT Physical Therapist               Outcome Measures     Row Name 04/04/23 1153 04/04/23 0800       How much help from another person do you currently need...    Turning from your back to your side while in flat bed without using bedrails? 4  -LO 4  -VL    Moving from lying on back to sitting on the side of a flat bed without bedrails? 4  -LO 3  -VL    Moving to and from a bed to a chair (including a wheelchair)? 3  -LO 3  -VL    Standing up from a chair using your arms (e.g., wheelchair, bedside chair)? 4  -LO 3  -VL    Climbing 3-5 steps with a railing? 3  -LO 2  -VL    To walk in hospital room? 3  -LO 2  -VL    AM-PAC 6 Clicks Score (PT) 21  -LO 17  -VL    Highest level of mobility 6 --> Walked 10 steps or more  -LO 5 --> Static standing  -VL    Row Name 04/04/23 1153          Functional Assessment    Outcome Measure Options AM-PAC 6 Clicks Basic Mobility (PT)  -LO           User Key  (r) = Recorded By, (t) = Taken By, (c) = Cosigned By    Initials Name Provider Type    VL Anais Guerra RN Registered Nurse    Magali Hurtado, PT Physical  Therapist                             Physical Therapy Education     Title: PT OT SLP Therapies (Done)     Topic: Physical Therapy (Done)     Point: Mobility training (Done)     Learning Progress Summary           Patient Acceptance, E, VU,NR by  at 4/4/2023 1030    Comment: PT POC                   Point: Home exercise program (Done)     Learning Progress Summary           Patient Acceptance, E, VU,NR by  at 4/4/2023 1030    Comment: PT POC                   Point: Body mechanics (Done)     Learning Progress Summary           Patient Acceptance, E, VU,NR by  at 4/4/2023 1030    Comment: PT POC                   Point: Precautions (Done)     Learning Progress Summary           Patient Acceptance, E, VU,NR by  at 4/4/2023 1030    Comment: PT POC                               User Key     Initials Effective Dates Name Provider Type Discipline     06/16/21 -  Magali Gomez, PT Physical Therapist PT              PT Recommendation and Plan  Planned Therapy Interventions (PT): balance training, gait training, home exercise program, patient/family education, neuromuscular re-education, stair training, strengthening, transfer training  Plan of Care Reviewed With: patient  Outcome Evaluation: PT eval completed. Patient alert and oriented x3. Presenting with mild deficits in general BLE strength, standing dynamic balance, effecting functional mobility belwo baseline. Patient will benefit from skilled IP PT services to address impairments in order to return to [facility]. Recommend home with assist at [place].     Time Calculation:    PT Charges     Row Name 04/04/23 1030             Time Calculation    Start Time 1030  -LO      PT Received On 04/04/23  -      PT Goal Re-Cert Due Date 04/14/23  -         Timed Charges    70565 - PT Therapeutic Activity Minutes 11  -LO         Untimed Charges    PT Eval/Re-eval Minutes 36  -LO         Total Minutes    Timed Charges Total Minutes 11  -LO      Untimed Charges Total Minutes 36   -LO       Total Minutes 47  -LO            User Key  (r) = Recorded By, (t) = Taken By, (c) = Cosigned By    Initials Name Provider Type    Magali Hurtado, PT Physical Therapist              Therapy Charges for Today     Code Description Service Date Service Provider Modifiers Qty    [id number]  PT THERAPEUTIC ACT EA 15 MIN 4/4/2023 Magali Gomez, PT GP 1    [id number] HC PT EVAL LOW COMPLEXITY 3 4/4/2023 Magali Gomez, PT GP 1          PT G-Codes  Outcome Measure Options: AM-PAC 6 Clicks Basic Mobility (PT)  AM-PAC 6 Clicks Score (PT): 21       Magali Gomez, PT  4/4/2023

## 2023-04-05 ENCOUNTER — TRANSITIONAL CARE MANAGEMENT TELEPHONE ENCOUNTER (OUTPATIENT)
Dept: CALL CENTER | Facility: HOSPITAL | Age: 63
End: 2023-04-05
Payer: COMMERCIAL

## 2023-04-05 ENCOUNTER — TELEPHONE (OUTPATIENT)
Dept: INTERNAL MEDICINE | Facility: CLINIC | Age: 63
End: 2023-04-05
Payer: COMMERCIAL

## 2023-04-05 DIAGNOSIS — E11.65 TYPE 2 DIABETES MELLITUS WITH HYPERGLYCEMIA, WITH LONG-TERM CURRENT USE OF INSULIN: Primary | ICD-10-CM

## 2023-04-05 DIAGNOSIS — Z79.4 TYPE 2 DIABETES MELLITUS WITH HYPERGLYCEMIA, WITH LONG-TERM CURRENT USE OF INSULIN: Primary | ICD-10-CM

## 2023-04-05 NOTE — TELEPHONE ENCOUNTER
Received below message about patient from TCM coordinator. Patient has insulin pump being managed by Dr. Don Tyler. Not sure why she is needing syringes. I suggest she follow up with his office but if unable to reach them I will call in small supply to get her through.     TCM coordinator:  Patients sugar today has been 130, she is doing well overall there is an issue of not having any syringes for insulin - please advise.   Patient would like syringes called into [facility] [address] [place] [address].   She would also like a call when this has been sent into pharmacy please advise.

## 2023-04-05 NOTE — OUTREACH NOTE
Call Center TCM Note    Flowsheet Row Responses   [facility] patient discharged from? [place]   Does the patient have one of the following disease processes/diagnoses(primary or secondary)? Other   TCM attempt successful? No   Unsuccessful attempts Attempt 1          Rosy Shoemaker RN    4/5/2023, 09:37 EDT

## 2023-04-05 NOTE — OUTREACH NOTE
Call Center TCM Note    Flowsheet Row Responses   [facility] patient discharged from? [place]   Does the patient have one of the following disease processes/diagnoses(primary or secondary)? Other   TCM attempt successful? Yes   Call start time 1111   Call end time 1114   Person spoke with today (if not patient) and relationship Patient   Meds reviewed with patient/caregiver? Yes   Does the patient have all medications ordered at discharge? Yes   Prescription comments Patient was placed on levemir 10 u in the morning however patient states she does not have syringes will involve pcp for syringes   Is the patient taking all medications as directed (includes completed medication regime)? Yes   Comments Patient aware of hospital fu on 04/18 @ 0915   Does the patient have an appointment with their PCP within 7 days of discharge? Yes   Has home health visited the patient within 72 hours of discharge? N/A   Psychosocial issues? No   Did the patient receive a copy of their discharge instructions? Yes   Nursing interventions Reviewed instructions with patient   What is the patient's perception of their health status since discharge? Improving   Is the patient/caregiver able to teach back signs and symptoms related to disease process for when to call PCP? Yes   Is the patient/caregiver able to teach back signs and symptoms related to disease process for when to call 911? Yes   Is the patient/caregiver able to teach back the hierarchy of who to call/visit for symptoms/problems? PCP, Specialist, Home health nurse, Urgent Care, ED, 911 Yes   TCM call completed? Yes   Wrap up additional comments Patients sugar today has been 130, she is doing well overall there is an issue of not having any syringes for insulin at the pharmacy will involve pcp   Call end time 1114   Would this patient benefit from a Referral to Amb Social Work? No   Is the patient interested in additional calls from an ambulatory ?  NOTE:   applies to high risk patients requiring additional follow-up. No          Rosy Shoemaker RN    4/5/2023, 11:16 EDT

## 2023-04-06 ENCOUNTER — TELEPHONE (OUTPATIENT)
Dept: INTERNAL MEDICINE | Facility: CLINIC | Age: 63
End: 2023-04-06

## 2023-04-06 RX ORDER — SYRING-NEEDL,DISP,INSUL,0.3 ML 31 G X1/4"
1 SYRINGE, EMPTY DISPOSABLE MISCELLANEOUS 2 TIMES DAILY PRN
Qty: 100 EACH | Refills: 1 | Status: SHIPPED | OUTPATIENT
Start: 2023-04-06

## 2023-04-06 NOTE — TELEPHONE ENCOUNTER
Caller: Thelma Michael    Relationship: Self    Best call back number: [phone]    Requested Prescriptions: SHE NEEDS SYRINGES FOR HER INSULIN VIALS     Pharmacy where request should be sent:  [facility] [address] [place] [address] [phone] [facility] [phone]    Last office visit with prescribing clinician: 3/16/2023   Last telemedicine visit with prescribing clinician: 4/18/2023   Next office visit with prescribing clinician: Visit date not found     Additional details provided by patient: PHARMACY GAVE HER EVERYTHING BUT SYRINGES    Does the patient have less than a 3 day supply:  [x] Yes  [] No    Would you like a call back once the refill request has been completed: [x] Yes [] No    If the office needs to give you a call back, can they leave a voicemail: [x] Yes [] No    Shira Laguerre, PCT   04/06/23 13:40 EDT

## 2023-04-13 ENCOUNTER — READMISSION MANAGEMENT (OUTPATIENT)
Dept: CALL CENTER | Facility: HOSPITAL | Age: 63
End: 2023-04-13
Payer: COMMERCIAL

## 2023-04-13 NOTE — OUTREACH NOTE
Medical Week 2 Survey    Flowsheet Row Responses   [facility] patient discharged from? [place]   Does the patient have one of the following disease processes/diagnoses(primary or secondary)? Other   Week 2 attempt successful? No   Unsuccessful attempts Attempt 1          Irma SNYDER - Registered Nurse

## 2023-04-18 ENCOUNTER — READMISSION MANAGEMENT (OUTPATIENT)
Dept: CALL CENTER | Facility: HOSPITAL | Age: 63
End: 2023-04-18
Payer: COMMERCIAL

## 2023-04-18 NOTE — OUTREACH NOTE
Medical Week 2 Survey    Flowsheet Row Responses   [facility] patient discharged from? [place]   Does the patient have one of the following disease processes/diagnoses(primary or secondary)? Other   Week 2 attempt successful? No   Unsuccessful attempts Attempt 2          Hillary CASTELAN - Registered Nurse

## 2023-04-27 ENCOUNTER — READMISSION MANAGEMENT (OUTPATIENT)
Dept: CALL CENTER | Facility: HOSPITAL | Age: 63
End: 2023-04-27
Payer: COMMERCIAL

## 2023-04-27 NOTE — OUTREACH NOTE
Medical Week 3 Survey    Flowsheet Row Responses   [facility] patient discharged from? [place]   Does the patient have one of the following disease processes/diagnoses(primary or secondary)? Other   Week 3 attempt successful? No   Unsuccessful attempts Attempt 1  [attempted pt, son and daughters]          REYES NORIEGA - Registered Nurse

## 2023-04-30 ENCOUNTER — HOSPITAL ENCOUNTER (OUTPATIENT)
Facility: HOSPITAL | Age: 63
Setting detail: OBSERVATION
LOS: 1 days | Discharge: HOME | End: 2023-05-05
Attending: EMERGENCY MEDICINE | Admitting: FAMILY MEDICINE
Payer: COMMERCIAL

## 2023-04-30 ENCOUNTER — APPOINTMENT (OUTPATIENT)
Dept: CT IMAGING | Facility: HOSPITAL | Age: 63
End: 2023-04-30
Payer: COMMERCIAL

## 2023-04-30 DIAGNOSIS — E11.00 HYPEROSMOLAR HYPERGLYCEMIC STATE (HHS): Primary | ICD-10-CM

## 2023-04-30 DIAGNOSIS — Z86.39 HISTORY OF DIABETIC GASTROPARESIS: ICD-10-CM

## 2023-04-30 DIAGNOSIS — R10.84 GENERALIZED ABDOMINAL PAIN: ICD-10-CM

## 2023-04-30 DIAGNOSIS — D64.9 ANEMIA, UNSPECIFIED TYPE: ICD-10-CM

## 2023-04-30 DIAGNOSIS — I10 ELEVATED BLOOD PRESSURE READING WITH DIAGNOSIS OF HYPERTENSION: ICD-10-CM

## 2023-04-30 DIAGNOSIS — E86.0 DEHYDRATION: ICD-10-CM

## 2023-04-30 DIAGNOSIS — Z86.39 HISTORY OF DIABETES MELLITUS: ICD-10-CM

## 2023-04-30 DIAGNOSIS — R11.2 NAUSEA AND VOMITING, UNSPECIFIED VOMITING TYPE: ICD-10-CM

## 2023-04-30 PROBLEM — N18.30 CKD (CHRONIC KIDNEY DISEASE) STAGE 3, GFR 30-59 ML/MIN: Status: ACTIVE | Noted: 2023-04-30

## 2023-04-30 PROBLEM — I16.0 HYPERTENSIVE URGENCY: Status: ACTIVE | Noted: 2023-04-30

## 2023-04-30 LAB
ALBUMIN SERPL-MCNC: 4.1 G/DL (ref 3.5–5.2)
ALBUMIN/GLOB SERPL: 1.1 G/DL
ALP SERPL-CCNC: 132 U/L (ref 39–117)
ALT SERPL W P-5'-P-CCNC: 9 U/L (ref 1–33)
ANION GAP SERPL CALCULATED.3IONS-SCNC: 13 MMOL/L (ref 5–15)
ANION GAP SERPL CALCULATED.3IONS-SCNC: 20 MMOL/L (ref 5–15)
ANION GAP SERPL CALCULATED.3IONS-SCNC: 24 MMOL/L (ref 5–15)
AST SERPL-CCNC: 7 U/L (ref 1–32)
ATMOSPHERIC PRESS: ABNORMAL MM[HG]
BACTERIA UR QL AUTO: ABNORMAL /HPF
BASE EXCESS BLDV CALC-SCNC: -3.4 MMOL/L (ref -2–2)
BASOPHILS # BLD AUTO: 0.02 10*3/MM3 (ref 0–0.2)
BASOPHILS NFR BLD AUTO: 0.3 % (ref 0–1.5)
BDY SITE: ABNORMAL
BILIRUB SERPL-MCNC: 0.4 MG/DL (ref 0–1.2)
BILIRUB UR QL STRIP: NEGATIVE
BODY TEMPERATURE: 37 C
BUN BLDA-MCNC: 21 MG/DL (ref 8–26)
BUN SERPL-MCNC: 20 MG/DL (ref 8–23)
BUN SERPL-MCNC: 20 MG/DL (ref 8–23)
BUN SERPL-MCNC: 21 MG/DL (ref 8–23)
BUN/CREAT SERPL: 16.4 (ref 7–25)
BUN/CREAT SERPL: 16.8 (ref 7–25)
BUN/CREAT SERPL: 17.2 (ref 7–25)
CA-I BLDA-SCNC: 1.18 MMOL/L (ref 1.2–1.32)
CALCIUM SPEC-SCNC: 10 MG/DL (ref 8.6–10.5)
CALCIUM SPEC-SCNC: 9 MG/DL (ref 8.6–10.5)
CALCIUM SPEC-SCNC: 9.5 MG/DL (ref 8.6–10.5)
CHLORIDE BLDA-SCNC: 102 MMOL/L (ref 98–109)
CHLORIDE SERPL-SCNC: 107 MMOL/L (ref 98–107)
CHLORIDE SERPL-SCNC: 111 MMOL/L (ref 98–107)
CHLORIDE SERPL-SCNC: 96 MMOL/L (ref 98–107)
CLARITY UR: ABNORMAL
CO2 BLDA-SCNC: 20 MMOL/L (ref 24–29)
CO2 BLDA-SCNC: 21.3 MMOL/L (ref 22–33)
CO2 SERPL-SCNC: 15 MMOL/L (ref 22–29)
CO2 SERPL-SCNC: 17 MMOL/L (ref 22–29)
CO2 SERPL-SCNC: 19 MMOL/L (ref 22–29)
COHGB MFR BLD: 1.4 %
COLOR UR: YELLOW
CREAT BLDA-MCNC: 1.2 MG/DL (ref 0.6–1.3)
CREAT SERPL-MCNC: 1.19 MG/DL (ref 0.57–1)
CREAT SERPL-MCNC: 1.22 MG/DL (ref 0.57–1)
CREAT SERPL-MCNC: 1.22 MG/DL (ref 0.57–1)
DEPRECATED RDW RBC AUTO: 44.3 FL (ref 37–54)
EGFRCR SERPLBLD CKD-EPI 2021: 49.7 ML/MIN/1.73
EGFRCR SERPLBLD CKD-EPI 2021: 49.7 ML/MIN/1.73
EGFRCR SERPLBLD CKD-EPI 2021: 50.7 ML/MIN/1.73
EGFRCR SERPLBLD CKD-EPI 2021: 51.2 ML/MIN/1.73
EOSINOPHIL # BLD AUTO: 0.01 10*3/MM3 (ref 0–0.4)
EOSINOPHIL NFR BLD AUTO: 0.1 % (ref 0.3–6.2)
EPAP: 0
ERYTHROCYTE [DISTWIDTH] IN BLOOD BY AUTOMATED COUNT: 13.6 % (ref 12.3–15.4)
GLOBULIN UR ELPH-MCNC: 3.6 GM/DL
GLUCOSE BLDC GLUCOMTR-MCNC: 124 MG/DL (ref 70–130)
GLUCOSE BLDC GLUCOMTR-MCNC: 146 MG/DL (ref 70–130)
GLUCOSE BLDC GLUCOMTR-MCNC: 191 MG/DL (ref 70–130)
GLUCOSE BLDC GLUCOMTR-MCNC: 192 MG/DL (ref 70–130)
GLUCOSE BLDC GLUCOMTR-MCNC: 226 MG/DL (ref 70–130)
GLUCOSE BLDC GLUCOMTR-MCNC: 241 MG/DL (ref 70–130)
GLUCOSE BLDC GLUCOMTR-MCNC: 284 MG/DL (ref 70–130)
GLUCOSE BLDC GLUCOMTR-MCNC: 415 MG/DL (ref 70–130)
GLUCOSE BLDC GLUCOMTR-MCNC: 92 MG/DL (ref 70–130)
GLUCOSE BLDC GLUCOMTR-MCNC: 95 MG/DL (ref 70–130)
GLUCOSE BLDC GLUCOMTR-MCNC: >599 MG/DL (ref 70–130)
GLUCOSE SERPL-MCNC: 147 MG/DL (ref 65–99)
GLUCOSE SERPL-MCNC: 239 MG/DL (ref 65–99)
GLUCOSE SERPL-MCNC: 616 MG/DL (ref 65–99)
GLUCOSE UR STRIP-MCNC: ABNORMAL MG/DL
HBA1C MFR BLD: 8.7 % (ref 4.8–5.6)
HCO3 BLDV-SCNC: 20.3 MMOL/L (ref 22–28)
HCT VFR BLD AUTO: 32.9 % (ref 34–46.6)
HCT VFR BLDA CALC: 33 % (ref 38–51)
HGB BLD-MCNC: 10.9 G/DL (ref 12–15.9)
HGB BLDA-MCNC: 11.2 G/DL (ref 12–17)
HGB BLDA-MCNC: 12.4 G/DL (ref 14–18)
HGB UR QL STRIP.AUTO: ABNORMAL
HYALINE CASTS UR QL AUTO: ABNORMAL /LPF
IMM GRANULOCYTES # BLD AUTO: 0.03 10*3/MM3 (ref 0–0.05)
IMM GRANULOCYTES NFR BLD AUTO: 0.4 % (ref 0–0.5)
INHALED O2 CONCENTRATION: 21 %
INR PPP: 1.15 (ref 0.89–1.12)
IPAP: 0
KETONES UR QL STRIP: ABNORMAL
LEUKOCYTE ESTERASE UR QL STRIP.AUTO: ABNORMAL
LIPASE SERPL-CCNC: 88 U/L (ref 13–60)
LYMPHOCYTES # BLD AUTO: 0.77 10*3/MM3 (ref 0.7–3.1)
LYMPHOCYTES NFR BLD AUTO: 9.9 % (ref 19.6–45.3)
MAGNESIUM SERPL-MCNC: 1.7 MG/DL (ref 1.6–2.4)
MAGNESIUM SERPL-MCNC: 1.8 MG/DL (ref 1.6–2.4)
MAGNESIUM SERPL-MCNC: 2.7 MG/DL (ref 1.6–2.4)
MCH RBC QN AUTO: 29.4 PG (ref 26.6–33)
MCHC RBC AUTO-ENTMCNC: 33.1 G/DL (ref 31.5–35.7)
MCV RBC AUTO: 88.7 FL (ref 79–97)
METHGB BLD QL: 0.2 %
MODALITY: ABNORMAL
MONOCYTES # BLD AUTO: 0.24 10*3/MM3 (ref 0.1–0.9)
MONOCYTES NFR BLD AUTO: 3.1 % (ref 5–12)
NEUTROPHILS NFR BLD AUTO: 6.73 10*3/MM3 (ref 1.7–7)
NEUTROPHILS NFR BLD AUTO: 86.2 % (ref 42.7–76)
NITRITE UR QL STRIP: NEGATIVE
NOTE: ABNORMAL
NRBC BLD AUTO-RTO: 0 /100 WBC (ref 0–0.2)
OSMOLALITY SERPL: 326 MOSM/KG (ref 275–295)
OXYHGB MFR BLDV: 79.1 %
PAW @ PEAK INSP FLOW SETTING VENT: 0 CMH2O
PCO2 BLDV: 31.6 MM HG (ref 41–51)
PH BLDV: 7.42 PH UNITS (ref 7.31–7.41)
PH UR STRIP.AUTO: 6.5 [PH] (ref 5–8)
PHOSPHATE SERPL-MCNC: 1.2 MG/DL (ref 2.5–4.5)
PHOSPHATE SERPL-MCNC: 1.4 MG/DL (ref 2.5–4.5)
PHOSPHATE SERPL-MCNC: 2.3 MG/DL (ref 2.5–4.5)
PLATELET # BLD AUTO: 501 10*3/MM3 (ref 140–450)
PMV BLD AUTO: 10.2 FL (ref 6–12)
PO2 BLDV: 43.9 MM HG (ref 27–53)
POTASSIUM BLDA-SCNC: 3.5 MMOL/L (ref 3.5–4.9)
POTASSIUM SERPL-SCNC: 3.2 MMOL/L (ref 3.5–5.2)
POTASSIUM SERPL-SCNC: 3.5 MMOL/L (ref 3.5–5.2)
POTASSIUM SERPL-SCNC: 3.9 MMOL/L (ref 3.5–5.2)
PROT SERPL-MCNC: 7.7 G/DL (ref 6–8.5)
PROT UR QL STRIP: ABNORMAL
PROTHROMBIN TIME: 14.9 SECONDS (ref 12.2–14.5)
QT INTERVAL: 374 MS
QTC INTERVAL: 469 MS
RBC # BLD AUTO: 3.71 10*6/MM3 (ref 3.77–5.28)
RBC # UR STRIP: ABNORMAL /HPF
REF LAB TEST METHOD: ABNORMAL
SODIUM BLD-SCNC: 137 MMOL/L (ref 138–146)
SODIUM SERPL-SCNC: 137 MMOL/L (ref 136–145)
SODIUM SERPL-SCNC: 142 MMOL/L (ref 136–145)
SODIUM SERPL-SCNC: 143 MMOL/L (ref 136–145)
SP GR UR STRIP: 1.02 (ref 1–1.03)
SQUAMOUS #/AREA URNS HPF: ABNORMAL /HPF
TOTAL RATE: 0 BREATHS/MINUTE
TROPONIN T SERPL HS-MCNC: 23 NG/L
UROBILINOGEN UR QL STRIP: ABNORMAL
WBC # UR STRIP: ABNORMAL /HPF
WBC NRBC COR # BLD: 7.8 10*3/MM3 (ref 3.4–10.8)

## 2023-04-30 PROCEDURE — 80053 COMPREHEN METABOLIC PANEL: CPT | Performed by: EMERGENCY MEDICINE

## 2023-04-30 PROCEDURE — 80047 BASIC METABLC PNL IONIZED CA: CPT

## 2023-04-30 PROCEDURE — 85610 PROTHROMBIN TIME: CPT | Performed by: EMERGENCY MEDICINE

## 2023-04-30 PROCEDURE — 96376 TX/PRO/DX INJ SAME DRUG ADON: CPT

## 2023-04-30 PROCEDURE — 99223 1ST HOSP IP/OBS HIGH 75: CPT | Performed by: HOSPITALIST

## 2023-04-30 PROCEDURE — 25510000001 IOPAMIDOL 61 % SOLUTION: Performed by: EMERGENCY MEDICINE

## 2023-04-30 PROCEDURE — 96366 THER/PROPH/DIAG IV INF ADDON: CPT

## 2023-04-30 PROCEDURE — 96375 TX/PRO/DX INJ NEW DRUG ADDON: CPT

## 2023-04-30 PROCEDURE — 99285 EMERGENCY DEPT VISIT HI MDM: CPT

## 2023-04-30 PROCEDURE — 82805 BLOOD GASES W/O2 SATURATION: CPT

## 2023-04-30 PROCEDURE — 84100 ASSAY OF PHOSPHORUS: CPT | Performed by: EMERGENCY MEDICINE

## 2023-04-30 PROCEDURE — G0378 HOSPITAL OBSERVATION PER HR: HCPCS

## 2023-04-30 PROCEDURE — 84484 ASSAY OF TROPONIN QUANT: CPT | Performed by: EMERGENCY MEDICINE

## 2023-04-30 PROCEDURE — 74177 CT ABD & PELVIS W/CONTRAST: CPT

## 2023-04-30 PROCEDURE — 25010000002 METOCLOPRAMIDE PER 10 MG: Performed by: HOSPITALIST

## 2023-04-30 PROCEDURE — 87086 URINE CULTURE/COLONY COUNT: CPT | Performed by: INTERNAL MEDICINE

## 2023-04-30 PROCEDURE — 36415 COLL VENOUS BLD VENIPUNCTURE: CPT

## 2023-04-30 PROCEDURE — 93005 ELECTROCARDIOGRAM TRACING: CPT | Performed by: EMERGENCY MEDICINE

## 2023-04-30 PROCEDURE — 82948 REAGENT STRIP/BLOOD GLUCOSE: CPT

## 2023-04-30 PROCEDURE — 96365 THER/PROPH/DIAG IV INF INIT: CPT

## 2023-04-30 PROCEDURE — 85014 HEMATOCRIT: CPT

## 2023-04-30 PROCEDURE — 96368 THER/DIAG CONCURRENT INF: CPT

## 2023-04-30 PROCEDURE — 83690 ASSAY OF LIPASE: CPT | Performed by: EMERGENCY MEDICINE

## 2023-04-30 PROCEDURE — 96361 HYDRATE IV INFUSION ADD-ON: CPT

## 2023-04-30 PROCEDURE — 87186 SC STD MICRODIL/AGAR DIL: CPT | Performed by: INTERNAL MEDICINE

## 2023-04-30 PROCEDURE — 25010000002 CEFTRIAXONE PER 250 MG: Performed by: HOSPITALIST

## 2023-04-30 PROCEDURE — 25010000002 HYDRALAZINE PER 20 MG: Performed by: INTERNAL MEDICINE

## 2023-04-30 PROCEDURE — 83036 HEMOGLOBIN GLYCOSYLATED A1C: CPT | Performed by: EMERGENCY MEDICINE

## 2023-04-30 PROCEDURE — 25010000002 METOCLOPRAMIDE PER 10 MG: Performed by: EMERGENCY MEDICINE

## 2023-04-30 PROCEDURE — 83735 ASSAY OF MAGNESIUM: CPT | Performed by: EMERGENCY MEDICINE

## 2023-04-30 PROCEDURE — 83930 ASSAY OF BLOOD OSMOLALITY: CPT | Performed by: EMERGENCY MEDICINE

## 2023-04-30 PROCEDURE — 81001 URINALYSIS AUTO W/SCOPE: CPT | Performed by: INTERNAL MEDICINE

## 2023-04-30 PROCEDURE — 87077 CULTURE AEROBIC IDENTIFY: CPT | Performed by: INTERNAL MEDICINE

## 2023-04-30 PROCEDURE — 85025 COMPLETE CBC W/AUTO DIFF WBC: CPT | Performed by: EMERGENCY MEDICINE

## 2023-04-30 RX ORDER — TRAMADOL HYDROCHLORIDE 50 MG/1
50 TABLET ORAL EVERY 6 HOURS PRN
Status: DISCONTINUED | OUTPATIENT
Start: 2023-04-30 | End: 2023-05-05 | Stop reason: HOSPADM

## 2023-04-30 RX ORDER — HYDRALAZINE HYDROCHLORIDE 20 MG/ML
10 INJECTION INTRAMUSCULAR; INTRAVENOUS EVERY 6 HOURS PRN
Status: DISCONTINUED | OUTPATIENT
Start: 2023-04-30 | End: 2023-05-01

## 2023-04-30 RX ORDER — TERAZOSIN 1 MG/1
1 CAPSULE ORAL NIGHTLY
Status: DISCONTINUED | OUTPATIENT
Start: 2023-04-30 | End: 2023-05-05 | Stop reason: HOSPADM

## 2023-04-30 RX ORDER — DEXTROSE, SODIUM CHLORIDE, AND POTASSIUM CHLORIDE 5; .45; .3 G/100ML; G/100ML; G/100ML
150 INJECTION INTRAVENOUS CONTINUOUS PRN
Status: DISCONTINUED | OUTPATIENT
Start: 2023-04-30 | End: 2023-05-01

## 2023-04-30 RX ORDER — SODIUM CHLORIDE AND POTASSIUM CHLORIDE 150; 450 MG/100ML; MG/100ML
250 INJECTION, SOLUTION INTRAVENOUS CONTINUOUS PRN
Status: DISCONTINUED | OUTPATIENT
Start: 2023-04-30 | End: 2023-05-01

## 2023-04-30 RX ORDER — CARVEDILOL 6.25 MG/1
6.25 TABLET ORAL 2 TIMES DAILY WITH MEALS
Status: DISCONTINUED | OUTPATIENT
Start: 2023-04-30 | End: 2023-05-03

## 2023-04-30 RX ORDER — L.ACID,PARA/B.BIFIDUM/S.THERM 8B CELL
1 CAPSULE ORAL DAILY
Status: DISCONTINUED | OUTPATIENT
Start: 2023-04-30 | End: 2023-05-05 | Stop reason: HOSPADM

## 2023-04-30 RX ORDER — SODIUM CHLORIDE 0.9 % (FLUSH) 0.9 %
10 SYRINGE (ML) INJECTION AS NEEDED
Status: DISCONTINUED | OUTPATIENT
Start: 2023-04-30 | End: 2023-05-05 | Stop reason: HOSPADM

## 2023-04-30 RX ORDER — SODIUM CHLORIDE 9 MG/ML
40 INJECTION, SOLUTION INTRAVENOUS AS NEEDED
Status: DISCONTINUED | OUTPATIENT
Start: 2023-04-30 | End: 2023-05-01

## 2023-04-30 RX ORDER — METOCLOPRAMIDE HYDROCHLORIDE 5 MG/ML
10 INJECTION INTRAMUSCULAR; INTRAVENOUS EVERY 6 HOURS
Status: DISCONTINUED | OUTPATIENT
Start: 2023-04-30 | End: 2023-05-02

## 2023-04-30 RX ORDER — CHOLECALCIFEROL (VITAMIN D3) 125 MCG
5 CAPSULE ORAL NIGHTLY PRN
Status: DISCONTINUED | OUTPATIENT
Start: 2023-04-30 | End: 2023-05-05 | Stop reason: HOSPADM

## 2023-04-30 RX ORDER — SODIUM CHLORIDE 0.9 % (FLUSH) 0.9 %
10 SYRINGE (ML) INJECTION EVERY 12 HOURS SCHEDULED
Status: DISCONTINUED | OUTPATIENT
Start: 2023-04-30 | End: 2023-05-05 | Stop reason: HOSPADM

## 2023-04-30 RX ORDER — SODIUM CHLORIDE 9 MG/ML
100 INJECTION, SOLUTION INTRAVENOUS CONTINUOUS
Status: DISCONTINUED | OUTPATIENT
Start: 2023-04-30 | End: 2023-04-30

## 2023-04-30 RX ORDER — FERROUS SULFATE 325(65) MG
325 TABLET ORAL
Status: DISCONTINUED | OUTPATIENT
Start: 2023-05-01 | End: 2023-05-05 | Stop reason: HOSPADM

## 2023-04-30 RX ORDER — SODIUM CHLORIDE 9 MG/ML
40 INJECTION, SOLUTION INTRAVENOUS AS NEEDED
Status: DISCONTINUED | OUTPATIENT
Start: 2023-04-30 | End: 2023-05-05 | Stop reason: HOSPADM

## 2023-04-30 RX ORDER — SODIUM CHLORIDE AND POTASSIUM CHLORIDE 150; 900 MG/100ML; MG/100ML
250 INJECTION, SOLUTION INTRAVENOUS CONTINUOUS PRN
Status: DISCONTINUED | OUTPATIENT
Start: 2023-04-30 | End: 2023-05-01

## 2023-04-30 RX ORDER — DEXTROSE AND SODIUM CHLORIDE 5; .9 G/100ML; G/100ML
150 INJECTION, SOLUTION INTRAVENOUS CONTINUOUS PRN
Status: DISCONTINUED | OUTPATIENT
Start: 2023-04-30 | End: 2023-05-01

## 2023-04-30 RX ORDER — DEXTROSE AND SODIUM CHLORIDE 5; .45 G/100ML; G/100ML
150 INJECTION, SOLUTION INTRAVENOUS CONTINUOUS PRN
Status: DISCONTINUED | OUTPATIENT
Start: 2023-04-30 | End: 2023-05-01

## 2023-04-30 RX ORDER — SODIUM CHLORIDE 450 MG/100ML
250 INJECTION, SOLUTION INTRAVENOUS CONTINUOUS PRN
Status: DISCONTINUED | OUTPATIENT
Start: 2023-04-30 | End: 2023-05-01

## 2023-04-30 RX ORDER — SODIUM CHLORIDE 9 MG/ML
250 INJECTION, SOLUTION INTRAVENOUS CONTINUOUS PRN
Status: DISCONTINUED | OUTPATIENT
Start: 2023-04-30 | End: 2023-05-05 | Stop reason: HOSPADM

## 2023-04-30 RX ORDER — DEXTROSE, SODIUM CHLORIDE, AND POTASSIUM CHLORIDE 5; .45; .15 G/100ML; G/100ML; G/100ML
150 INJECTION INTRAVENOUS CONTINUOUS PRN
Status: DISCONTINUED | OUTPATIENT
Start: 2023-04-30 | End: 2023-05-01

## 2023-04-30 RX ORDER — DEXTROSE, SODIUM CHLORIDE, AND POTASSIUM CHLORIDE 5; .9; .15 G/100ML; G/100ML; G/100ML
150 INJECTION INTRAVENOUS CONTINUOUS PRN
Status: DISCONTINUED | OUTPATIENT
Start: 2023-04-30 | End: 2023-05-01

## 2023-04-30 RX ORDER — DEXTROSE MONOHYDRATE 25 G/50ML
10-50 INJECTION, SOLUTION INTRAVENOUS
Status: DISCONTINUED | OUTPATIENT
Start: 2023-04-30 | End: 2023-05-05 | Stop reason: HOSPADM

## 2023-04-30 RX ORDER — POTASSIUM CHLORIDE 750 MG/1
40 CAPSULE, EXTENDED RELEASE ORAL EVERY 4 HOURS
Status: COMPLETED | OUTPATIENT
Start: 2023-04-30 | End: 2023-04-30

## 2023-04-30 RX ORDER — LABETALOL HYDROCHLORIDE 5 MG/ML
10 INJECTION, SOLUTION INTRAVENOUS ONCE
Status: COMPLETED | OUTPATIENT
Start: 2023-04-30 | End: 2023-04-30

## 2023-04-30 RX ORDER — SODIUM CHLORIDE AND POTASSIUM CHLORIDE 300; 900 MG/100ML; MG/100ML
250 INJECTION, SOLUTION INTRAVENOUS CONTINUOUS PRN
Status: DISCONTINUED | OUTPATIENT
Start: 2023-04-30 | End: 2023-05-01

## 2023-04-30 RX ORDER — ACETAMINOPHEN 325 MG/1
650 TABLET ORAL EVERY 4 HOURS PRN
Status: DISCONTINUED | OUTPATIENT
Start: 2023-04-30 | End: 2023-05-05 | Stop reason: HOSPADM

## 2023-04-30 RX ORDER — IBUPROFEN 600 MG/1
1 TABLET ORAL
Status: DISCONTINUED | OUTPATIENT
Start: 2023-04-30 | End: 2023-05-05 | Stop reason: HOSPADM

## 2023-04-30 RX ORDER — POTASSIUM CHLORIDE, DEXTROSE MONOHYDRATE AND SODIUM CHLORIDE 300; 5; 900 MG/100ML; G/100ML; MG/100ML
150 INJECTION, SOLUTION INTRAVENOUS CONTINUOUS PRN
Status: DISCONTINUED | OUTPATIENT
Start: 2023-04-30 | End: 2023-05-01

## 2023-04-30 RX ORDER — MIRTAZAPINE 15 MG/1
7.5 TABLET, FILM COATED ORAL NIGHTLY
Status: DISCONTINUED | OUTPATIENT
Start: 2023-04-30 | End: 2023-05-05 | Stop reason: HOSPADM

## 2023-04-30 RX ORDER — ONDANSETRON 4 MG/1
4 TABLET, FILM COATED ORAL EVERY 6 HOURS PRN
Status: DISCONTINUED | OUTPATIENT
Start: 2023-04-30 | End: 2023-05-05 | Stop reason: HOSPADM

## 2023-04-30 RX ORDER — PANTOPRAZOLE SODIUM 40 MG/1
40 TABLET, DELAYED RELEASE ORAL DAILY
Status: DISCONTINUED | OUTPATIENT
Start: 2023-04-30 | End: 2023-05-01

## 2023-04-30 RX ORDER — METOCLOPRAMIDE HYDROCHLORIDE 5 MG/ML
10 INJECTION INTRAMUSCULAR; INTRAVENOUS ONCE
Status: COMPLETED | OUTPATIENT
Start: 2023-04-30 | End: 2023-04-30

## 2023-04-30 RX ORDER — NICOTINE POLACRILEX 4 MG
15 LOZENGE BUCCAL
Status: DISCONTINUED | OUTPATIENT
Start: 2023-04-30 | End: 2023-05-05 | Stop reason: HOSPADM

## 2023-04-30 RX ORDER — ONDANSETRON 2 MG/ML
4 INJECTION INTRAMUSCULAR; INTRAVENOUS EVERY 6 HOURS PRN
Status: DISCONTINUED | OUTPATIENT
Start: 2023-04-30 | End: 2023-05-05 | Stop reason: HOSPADM

## 2023-04-30 RX ADMIN — Medication 1 CAPSULE: at 16:21

## 2023-04-30 RX ADMIN — IOPAMIDOL 85 ML: 612 INJECTION, SOLUTION INTRAVENOUS at 12:21

## 2023-04-30 RX ADMIN — METOCLOPRAMIDE 10 MG: 5 INJECTION, SOLUTION INTRAMUSCULAR; INTRAVENOUS at 17:18

## 2023-04-30 RX ADMIN — HYDRALAZINE HYDROCHLORIDE 10 MG: 20 INJECTION INTRAMUSCULAR; INTRAVENOUS at 13:47

## 2023-04-30 RX ADMIN — METOCLOPRAMIDE 10 MG: 5 INJECTION, SOLUTION INTRAMUSCULAR; INTRAVENOUS at 12:24

## 2023-04-30 RX ADMIN — POTASSIUM CHLORIDE, DEXTROSE MONOHYDRATE AND SODIUM CHLORIDE 150 ML/HR: 300; 5; 450 INJECTION, SOLUTION INTRAVENOUS at 17:36

## 2023-04-30 RX ADMIN — POTASSIUM CHLORIDE, DEXTROSE MONOHYDRATE AND SODIUM CHLORIDE 150 ML/HR: 150; 5; 450 INJECTION, SOLUTION INTRAVENOUS at 16:21

## 2023-04-30 RX ADMIN — POTASSIUM CHLORIDE, DEXTROSE MONOHYDRATE AND SODIUM CHLORIDE 150 ML/HR: 150; 5; 450 INJECTION, SOLUTION INTRAVENOUS at 21:31

## 2023-04-30 RX ADMIN — METOCLOPRAMIDE 10 MG: 5 INJECTION, SOLUTION INTRAMUSCULAR; INTRAVENOUS at 23:05

## 2023-04-30 RX ADMIN — INSULIN HUMAN 10.8 UNITS/HR: 1 INJECTION, SOLUTION INTRAVENOUS at 13:14

## 2023-04-30 RX ADMIN — POTASSIUM CHLORIDE 40 MEQ: 750 CAPSULE, EXTENDED RELEASE ORAL at 17:17

## 2023-04-30 RX ADMIN — RIVAROXABAN 20 MG: 20 TABLET, FILM COATED ORAL at 17:17

## 2023-04-30 RX ADMIN — SODIUM CHLORIDE 1000 ML: 9 INJECTION, SOLUTION INTRAVENOUS at 12:25

## 2023-04-30 RX ADMIN — LABETALOL HYDROCHLORIDE 10 MG: 5 INJECTION, SOLUTION INTRAVENOUS at 15:56

## 2023-04-30 RX ADMIN — TERAZOSIN HYDROCHLORIDE 1 MG: 1 CAPSULE ORAL at 21:31

## 2023-04-30 RX ADMIN — CEFTRIAXONE 1 G: 1 INJECTION, POWDER, FOR SOLUTION INTRAMUSCULAR; INTRAVENOUS at 16:32

## 2023-04-30 RX ADMIN — PANTOPRAZOLE SODIUM 40 MG: 40 TABLET, DELAYED RELEASE ORAL at 15:58

## 2023-04-30 RX ADMIN — Medication 10 ML: at 21:32

## 2023-04-30 RX ADMIN — Medication 10 ML: at 21:41

## 2023-04-30 RX ADMIN — SODIUM CHLORIDE 1000 ML/HR: 9 INJECTION, SOLUTION INTRAVENOUS at 12:24

## 2023-04-30 RX ADMIN — POTASSIUM CHLORIDE 40 MEQ: 750 CAPSULE, EXTENDED RELEASE ORAL at 21:31

## 2023-04-30 RX ADMIN — MIRTAZAPINE 7.5 MG: 15 TABLET, FILM COATED ORAL at 21:31

## 2023-04-30 RX ADMIN — Medication 10 ML: at 15:57

## 2023-04-30 RX ADMIN — CARVEDILOL 6.25 MG: 6.25 TABLET, FILM COATED ORAL at 17:17

## 2023-04-30 NOTE — ED PROVIDER NOTES
"Subjective   History of Present Illness  62-year-old female presents for evaluation of abdominal pain accompanied by nausea, vomiting, diarrhea, and generalized weakness.  Of note, the patient has a history of diabetes and gastroparesis.  She states that about 4 days ago she began to feel ill and notes that her symptoms have gradually worsened since that time.  Because of her nausea and vomiting, she has been unable to tolerate her oral medications at home and as a result of her persistent symptoms, she called EMS today.  She was noted to be hypertensive and fingerstick blood sugar read as \"high.\"  She was subsequently brought to our facility to be evaluated.  She currently is complaining of generalized abdominal pain that she rates at 8 out of 10 in severity.  She denies any known dietary triggers for her symptoms.  No known sick contacts.  No known exposures to anyone with COVID-19.  No recent antibiotic use.        Review of Systems   Gastrointestinal: Positive for abdominal pain, diarrhea, nausea and vomiting.   Neurological: Positive for weakness.   All other systems reviewed and are negative.      Past Medical History:   Diagnosis Date   • Acid reflux    • Acute bronchitis    • Cardiac murmur    • Depression with anxiety 10/5/2020   • Diabetes mellitus    • Gastroesophageal reflux disease 5/13/2016   • H/O echocardiogram 08/07/2012    i. LVEF 65%.ii. Mild LVH.iii. Borderline evidence of atrial septal aneurysm.  No PFO.    • History of nuclear stress test 08/22/2014    Negative for ischemia and scars; LVEF 77%.     • History of TIAs 7/15/2021   • Hyperlipidemia    • Hypertension    • Impacted cerumen of both ears    • Migraine    • Migraines 10/31/2019   • Self-catheterizes urinary bladder    • Sinusitis    • Stroke    • Tobacco abuse     quit 4 days ago.     • Urticaria    • Vitamin D deficiency 5/13/2016       No Known Allergies    Past Surgical History:   Procedure Laterality Date   • CAPSULE ENDOSCOPY  " 07/27/2021    Procedure: PILLCAM DEPLOYMENT;  Surgeon: Mikael Worthy MD;  Location:  [facility];  Service: Gastroenterology;;   • COLONOSCOPY     • COLONOSCOPY N/A 07/27/2021    Procedure: COLONOSCOPY;  Surgeon: Mikael Worthy MD;  Location:  [facility];  Service: Gastroenterology;  Laterality: N/A;   • DENTAL PROCEDURE     • ENDOSCOPY N/A 06/30/2021    Procedure: ESOPHAGOGASTRODUODENOSCOPY;  Surgeon: Brunner, Mark I, MD;  Location:  [facility];  Service: Gastroenterology;  Laterality: N/A;   • ENDOSCOPY N/A 07/27/2021    Procedure: ESOPHAGOGASTRODUODENOSCOPY;  Surgeon: Mikael Worthy MD;  Location:  [facility];  Service: Gastroenterology;  Laterality: N/A;   • ENDOSCOPY WITH JTUBE N/A 03/16/2022    Procedure: ESOPHAGOGASTRODUODENOSCOPY WITH JEJUNAL TUBE INSERTION;  Surgeon: Thom Glover MD;  Location:  [facility];  Service: Gastroenterology;  Laterality: N/A;   • UPPER GASTROINTESTINAL ENDOSCOPY         Family History   Problem Relation Age of Onset   • Anxiety disorder Other    • Arthritis Other    • ADD / ADHD Other    • Heart disease Other         cardiac disorder   • Depression Other    • Diabetes Other    • Hyperlipidemia Other    • Hypertension Other    • Lung cancer Other    • Osteoporosis Other    • Hypertension Brother    • Diabetes Brother    • Hyperlipidemia Mother    • Hypertension Mother    • Colon cancer Neg Hx        Social History     Socioeconomic History   • Marital status:    Tobacco Use   • Smoking status: Former     Packs/day: 0.25     Years: 30.00     Pack years: 7.50     Types: Cigarettes     Quit date: 5/28/2019     Years since quitting: 3.9   • Smokeless tobacco: Never   • Tobacco comments:     BC PL never smoker    Vaping Use   • Vaping Use: Never used   Substance and Sexual Activity   • Alcohol use: Yes     Alcohol/week: 1.0 standard drink     Types: 1 Glasses of wine per week     Comment: ocassional   • Drug use: No   • Sexual  activity: Not Currently     Comment: Single            Objective   Physical Exam  Vitals and nursing note reviewed.   Constitutional:       Appearance: She is well-developed. She is not diaphoretic.   HENT:      Head: Normocephalic and atraumatic.      Comments: Dry tongue and mucous membranes  Eyes:      Pupils: Pupils are equal, round, and reactive to light.   Cardiovascular:      Rate and Rhythm: Regular rhythm. Tachycardia present.      Heart sounds: Normal heart sounds. No murmur heard.    No friction rub. No gallop.   Pulmonary:      Effort: Pulmonary effort is normal. No respiratory distress.      Breath sounds: Normal breath sounds. No wheezing or rales.   Abdominal:      General: Bowel sounds are normal. There is no distension.      Palpations: Abdomen is soft. There is no mass.      Tenderness: There is abdominal tenderness. There is no guarding or rebound.      Comments: Generalized abdominal tenderness present without peritoneal signs or pain out of proportion to exam   Musculoskeletal:         General: Normal range of motion.      Cervical back: Neck supple.   Skin:     General: Skin is warm and dry.      Findings: No erythema or rash.   Neurological:      Mental Status: She is alert and oriented to person, place, and time.      Comments: 5 out of 5 strength in all fours, neurovascularly intact distally in all fours with bounding distal pulses normal sensation, clear and fluent speech   Psychiatric:         Mood and Affect: Mood normal.         Thought Content: Thought content normal.         Judgment: Judgment normal.         Critical Care  Performed by: Pedro Rodríguez MD  Authorized by: Pedro Rodríguez MD     Critical care provider statement:     Critical care time (minutes):  35    Critical care was necessary to treat or prevent imminent or life-threatening deterioration of the following conditions:  Endocrine crisis    Critical care was time spent personally by me on the following  "activities:  Development of treatment plan with patient or surrogate, evaluation of patient's response to treatment, examination of patient, obtaining history from patient or surrogate, ordering and performing treatments and interventions, ordering and review of laboratory studies, ordering and review of radiographic studies, pulse oximetry, re-evaluation of patient's condition and review of old charts               ED Course  ED Course as of 04/30/23 1412   Sun Apr 30, 2023   1032 62-year-old female presents for evaluation of abdominal pain accompanied by nausea, vomiting, diarrhea, generalized weakness.  She began to feel ill 4 days ago and symptoms have gradually worsened since that time.  She has been unable to tolerate her medications as a result of her symptoms.  On EMS arrival, the patient was significantly hypertensive and her blood sugar simply read \"high.\"  She has a history of gastroparesis and wonders if this could be a contributing factor.  On arrival, the patient is nontoxic-appearing.  Exam remarkable for generalized abdominal tenderness without guarding noted.  No pain out of proportion to exam.  Differential diagnosis is quite broad.  We will obtain labs and imaging, and we will reassess following initial interventions. [DD]   1134 Glucose on bedside Chem-8 is reading \"high.\" [DD]   1150 Labs are consistent with HHS.  Insulin drip initiated per [facility] protocol.  Additional IV fluids given.  We will seek admission to the hospitalist. [DD]   1401 I discussed the patient's case with Dr. Taylor, and the patient will be admitted under his care for further evaluation and treatment.  The patient is aware/agreeable with the plan at this time. [DD]      ED Course User Index  [DD] Pedro Rodríguez MD                                       Recent Results (from the past 24 hour(s))   ECG 12 Lead Other; N/V    Collection Time: 04/30/23 10:53 AM   Result Value Ref Range    QT Interval 374 ms    QTC Interval 469 ms "   Comprehensive Metabolic Panel    Collection Time: 04/30/23 11:03 AM    Specimen: Blood   Result Value Ref Range    Glucose 616 (C) 65 - 99 mg/dL    BUN 21 8 - 23 mg/dL    Creatinine 1.22 (H) 0.57 - 1.00 mg/dL    Sodium 137 136 - 145 mmol/L    Potassium 3.5 3.5 - 5.2 mmol/L    Chloride 96 (L) 98 - 107 mmol/L    CO2 17.0 (L) 22.0 - 29.0 mmol/L    Calcium 10.0 8.6 - 10.5 mg/dL    Total Protein 7.7 6.0 - 8.5 g/dL    Albumin 4.1 3.5 - 5.2 g/dL    ALT (SGPT) 9 1 - 33 U/L    AST (SGOT) 7 1 - 32 U/L    Alkaline Phosphatase 132 (H) 39 - 117 U/L    Total Bilirubin 0.4 0.0 - 1.2 mg/dL    Globulin 3.6 gm/dL    A/G Ratio 1.1 g/dL    BUN/Creatinine Ratio 17.2 7.0 - 25.0    Anion Gap 24.0 (H) 5.0 - 15.0 mmol/L    eGFR 49.7 (L) >60.0 mL/min/1.73   Protime-INR    Collection Time: 04/30/23 11:03 AM    Specimen: Blood   Result Value Ref Range    Protime 14.9 (H) 12.2 - 14.5 Seconds    INR 1.15 (H) 0.89 - 1.12   Lipase    Collection Time: 04/30/23 11:03 AM    Specimen: Blood   Result Value Ref Range    Lipase 88 (H) 13 - 60 U/L   Single High Sensitivity Troponin T    Collection Time: 04/30/23 11:03 AM    Specimen: Blood   Result Value Ref Range    HS Troponin T 23 (H) <10 ng/L   Magnesium    Collection Time: 04/30/23 11:03 AM    Specimen: Blood   Result Value Ref Range    Magnesium 2.7 (H) 1.6 - 2.4 mg/dL   CBC Auto Differential    Collection Time: 04/30/23 11:03 AM    Specimen: Blood   Result Value Ref Range    WBC 7.80 3.40 - 10.80 10*3/mm3    RBC 3.71 (L) 3.77 - 5.28 10*6/mm3    Hemoglobin 10.9 (L) 12.0 - 15.9 g/dL    Hematocrit 32.9 (L) 34.0 - 46.6 %    MCV 88.7 79.0 - 97.0 fL    MCH 29.4 26.6 - 33.0 pg    MCHC 33.1 31.5 - 35.7 g/dL    RDW 13.6 12.3 - 15.4 %    RDW-SD 44.3 37.0 - 54.0 fl    MPV 10.2 6.0 - 12.0 fL    Platelets 501 (H) 140 - 450 10*3/mm3    Neutrophil % 86.2 (H) 42.7 - 76.0 %    Lymphocyte % 9.9 (L) 19.6 - 45.3 %    Monocyte % 3.1 (L) 5.0 - 12.0 %    Eosinophil % 0.1 (L) 0.3 - 6.2 %    Basophil % 0.3 0.0 - 1.5 %     Immature Grans % 0.4 0.0 - 0.5 %    Neutrophils, Absolute 6.73 1.70 - 7.00 10*3/mm3    Lymphocytes, Absolute 0.77 0.70 - 3.10 10*3/mm3    Monocytes, Absolute 0.24 0.10 - 0.90 10*3/mm3    Eosinophils, Absolute 0.01 0.00 - 0.40 10*3/mm3    Basophils, Absolute 0.02 0.00 - 0.20 10*3/mm3    Immature Grans, Absolute 0.03 0.00 - 0.05 10*3/mm3    nRBC 0.0 0.0 - 0.2 /100 WBC   Phosphorus    Collection Time: 04/30/23 11:03 AM    Specimen: Blood   Result Value Ref Range    Phosphorus 2.3 (L) 2.5 - 4.5 mg/dL   Hemoglobin A1c    Collection Time: 04/30/23 11:03 AM    Specimen: Blood   Result Value Ref Range    Hemoglobin A1C 8.70 (H) 4.80 - 5.60 %   POC CHEM 8    Collection Time: 04/30/23 11:09 AM    Specimen: Blood   Result Value Ref Range    Glucose >599 (C) 70 - 130 mg/dL    BUN 21 8 - 26 mg/dL    Creatinine 1.20 0.60 - 1.30 mg/dL    Sodium 137 (L) 138 - 146 mmol/L    POC Potassium 3.5 3.5 - 4.9 mmol/L    Chloride 102 98 - 109 mmol/L    Total CO2 20 (L) 24 - 29 mmol/L    Hemoglobin 11.2 (L) 12.0 - 17.0 g/dL    Hematocrit 33 (L) 38 - 51 %    Ionized Calcium 1.18 (L) 1.20 - 1.32 mmol/L    eGFR 50.7 (L) >60.0 mL/min/1.73   Blood Gas, Venous With Co-Ox    Collection Time: 04/30/23 11:45 AM    Specimen: Venous Blood   Result Value Ref Range    Site Nurse/Dr Draw     pH, Venous 7.416 (H) 7.310 - 7.410 pH Units    pCO2, Venous 31.6 (L) 41.0 - 51.0 mm Hg    pO2, Venous 43.9 27.0 - 53.0 mm Hg    HCO3, Venous 20.3 (L) 22.0 - 28.0 mmol/L    Base Excess, Venous -3.4 (L) -2.0 - 2.0 mmol/L    Hemoglobin, Blood Gas 12.4 (L) 14 - 18 g/dL    Oxyhemoglobin Venous 79.1 %    Methemoglobin Venous 0.2 %    Carboxyhemoglobin Venous 1.4 %    CO2 Content 21.3 (L) 22 - 33 mmol/L    Temperature 37.0 C    Barometric Pressure for Blood Gas      Modality Room Air     FIO2 21 %    Rate 0 Breaths/minute    PIP 0 cmH2O    IPAP 0     EPAP 0     Note     Osmolality, Serum    Collection Time: 04/30/23 11:54 AM    Specimen: Blood   Result Value Ref Range     Osmolality 326 (H) 275 - 295 mOsm/kg     Note: In addition to lab results from this visit, the labs listed above may include labs taken at another facility or during a different encounter within the last 24 hours. Please correlate lab times with ED admission and discharge times for further clarification of the services performed during this visit.    CT Abdomen Pelvis With Contrast   Final Result   Impression:   No acute abdominopelvic findings.      There is greater than expected circumferential wall thickening of the urinary bladder and correlate with urinalysis to exclude cystitis.      Scattered colonic diverticulosis without evidence of diverticulitis.      Redemonstration of diffuse thickening and small nodularity of the bilateral adrenal glands, suboptimally characterized on this study.            Electronically Signed: Gautam Minor     4/30/2023 12:32 PM EDT     Workstation ID: [id number]        Vitals:    04/30/23 1045 04/30/23 1150 04/30/23 1313 04/30/23 1315   BP: (!) 173/127  (!) 225/99 (!) 227/101   Pulse: 100 106 104 105   Resp:       Temp:       TempSrc:       SpO2: 100% 100% 100% 100%   Weight:       Height:         Medications   sodium chloride 0.9 % flush 10 mL (has no administration in time range)   sodium chloride 0.9 % flush 10 mL (has no administration in time range)   sodium chloride 0.9 % flush 10 mL (has no administration in time range)   sodium chloride 0.9 % infusion 40 mL (has no administration in time range)   dextrose (GLUTOSE) oral gel 15 g (has no administration in time range)   dextrose (D50W) (25 g/50 mL) IV injection 10-50 mL (has no administration in time range)   glucagon (GLUCAGEN) injection 1 mg (has no administration in time range)   sodium chloride 0.9 % bolus (1,000 mL/hr Intravenous New Bag 4/30/23 1224)   sodium chloride 0.9 % infusion (has no administration in time range)   sodium chloride 0.9 % with KCl 20 mEq/L infusion (has no administration in time range)   sodium  chloride 0.9 % with KCl 40 mEq/L infusion (has no administration in time range)   dextrose 5 % and sodium chloride 0.9 % infusion (has no administration in time range)   dextrose 5 % and sodium chloride 0.9 % with KCl 20 mEq/L infusion (has no administration in time range)   dextrose 5 % and sodium chloride 0.9 % with KCl 40 mEq/L infusion (has no administration in time range)   sodium chloride 0.45 % infusion (has no administration in time range)   sodium chloride 0.45 % with KCl 20 mEq/L infusion (has no administration in time range)   sodium chloride 0.45 % 1,000 mL with potassium chloride 40 mEq infusion (has no administration in time range)   dextrose 5 % and sodium chloride 0.45 % infusion (has no administration in time range)   dextrose 5 % and sodium chloride 0.45 % with KCl 20 mEq/L infusion (has no administration in time range)   dextrose 5 % and sodium chloride 0.45 % with KCl 40 mEq/L infusion (has no administration in time range)   insulin regular 1 unit/mL in 0.9% sodium chloride ([facility]) (10.8 Units/hr Intravenous New Bag 4/30/23 1314)   Potassium Replacement - Follow Nurse / BPA Driven Protocol (has no administration in time range)   Magnesium Standard Dose Replacement - Follow Nurse / BPA Driven Protocol (has no administration in time range)   Phosphorus Replacement - Follow Nurse / BPA Driven Protocol (has no administration in time range)   sodium chloride 0.9 % infusion (has no administration in time range)   hydrALAZINE (APRESOLINE) injection 10 mg (10 mg Intravenous Given 4/30/23 1347)   carvedilol (COREG) tablet 6.25 mg (has no administration in time range)   terazosin (HYTRIN) capsule 1 mg (has no administration in time range)   ferrous sulfate tablet 325 mg (has no administration in time range)   melatonin tablet 5 mg (has no administration in time range)   metoclopramide (REGLAN) injection 10 mg (has no administration in time range)   mirtazapine (REMERON) tablet 7.5 mg (has no  administration in time range)   pantoprazole (PROTONIX) EC tablet 40 mg (has no administration in time range)   rivaroxaban (XARELTO) tablet 20 mg (has no administration in time range)   traMADol (ULTRAM) tablet 50 mg (has no administration in time range)   sodium chloride 0.9 % bolus 1,000 mL (1,000 mL Intravenous New Bag 4/30/23 1225)   metoclopramide (REGLAN) injection 10 mg (10 mg Intravenous Given 4/30/23 1224)   iopamidol (ISOVUE-300) 61 % injection 100 mL (85 mL Intravenous Given 4/30/23 1221)     ECG/EMG Results (last 24 hours)     Procedure Component Value Units Date/Time    ECG 12 Lead Other; N/V [[id number]] Collected: 04/30/23 1053     Updated: 04/30/23 1358     QT Interval 374 ms      QTC Interval 469 ms     Narrative:      Test Reason : Other~  Blood Pressure :   */*   mmHG  Vent. Rate :  95 BPM     Atrial Rate :  95 BPM     P-R Int : 136 ms          QRS Dur :  76 ms      QT Int : 374 ms       P-R-T Axes :  75  67  75 degrees     QTc Int : 469 ms    Normal sinus rhythm  Right atrial enlargement  Voltage criteria for left ventricular hypertrophy  Abnormal ECG  Confirmed by MD Rodríguez Michael ([id number]) on 4/30/2023 1:57:54 PM    Referred By: LORNA           Confirmed By: Ludwig Rodríguez MD        ECG 12 Lead Other; N/V   Final Result   Test Reason : Other~   Blood Pressure :   */*   mmHG   Vent. Rate :  95 BPM     Atrial Rate :  95 BPM      P-R Int : 136 ms          QRS Dur :  76 ms       QT Int : 374 ms       P-R-T Axes :  75  67  75 degrees      QTc Int : 469 ms      Normal sinus rhythm   Right atrial enlargement   Voltage criteria for left ventricular hypertrophy   Abnormal ECG   Confirmed by MD Rodríguez Michael ([id number]) on 4/30/2023 1:57:54 PM      Referred By: LORNA           Confirmed By: Ludwig Rodríguez MD                 [facility]    Final diagnoses:   Hyperosmolar hyperglycemic state (HHS)   Nausea and vomiting, unspecified vomiting type   Dehydration   History of diabetes mellitus   History of diabetic gastroparesis    Elevated blood pressure reading with diagnosis of hypertension   Generalized abdominal pain   Anemia, unspecified type       ED Disposition  ED Disposition     ED Disposition   Decision to Admit    Condition   --    Comment   Level of Care: Telemetry [5]   Diagnosis: Refractory nausea and vomiting [234539]   Bed Request Comments: [facility] tele floor               No follow-up provider specified.       Medication List      No changes were made to your prescriptions during this visit.          Pedro Rodríguez MD  04/30/23 7650

## 2023-04-30 NOTE — H&P
[facility] Medicine Services  HISTORY AND PHYSICAL    Patient Name: Thelma Michael  : 1960  MRN: [id number]  Primary Care Physician: Monet Howe APRN  Date of admission: 2023      Subjective   Subjective     Chief Complaint:  N/V, abdominal pain    HPI:  Thelma Michael is a 62 y.o. female with hx of type 1 diabetes, Afib on Xarelto, iron deficiency anemia, gastroparesis, HTN, HLD, CVA, GERD, and tobacco abuse who presents due to intractable nausea, vomiting, and abdominal pain for the past 3-4 days. She has not been able to keep her medicines down and has had little PO intake. Reports increased urinary frequency. Denies any dyspnea, cough, fevers, chills, diarrhea, chest pain. She is found to have a glucose of 612. CT A/P no acute findings. UA pending. Admitted for further management.     Review of Systems   Gen-no fevers, no chills  CV-no chest pain, no palpitations  Resp-no cough, no dyspnea  GI-+N/V, +abd pain      Personal History     Past Medical History:   Diagnosis Date   • Acid reflux    • Acute bronchitis    • Cardiac murmur    • Depression with anxiety 10/5/2020   • Diabetes mellitus    • Gastroesophageal reflux disease 2016   • H/O echocardiogram 2012    i. LVEF 65%.ii. Mild LVH.iii. Borderline evidence of atrial septal aneurysm.  No PFO.    • History of nuclear stress test 2014    Negative for ischemia and scars; LVEF 77%.     • History of TIAs 7/15/2021   • Hyperlipidemia    • Hypertension    • Impacted cerumen of both ears    • Migraine    • Migraines 10/31/2019   • Self-catheterizes urinary bladder    • Sinusitis    • Stroke    • Tobacco abuse     quit 4 days ago.     • Urticaria    • Vitamin D deficiency 2016             Past Surgical History:   Procedure Laterality Date   • CAPSULE ENDOSCOPY  2021    Procedure: PILLCAM DEPLOYMENT;  Surgeon: Mikael Worthy MD;  Location: [facility];  Service:  Gastroenterology;;   • COLONOSCOPY     • COLONOSCOPY N/A 07/27/2021    Procedure: COLONOSCOPY;  Surgeon: Mikael Worthy MD;  Location:  [facility];  Service: Gastroenterology;  Laterality: N/A;   • DENTAL PROCEDURE     • ENDOSCOPY N/A 06/30/2021    Procedure: ESOPHAGOGASTRODUODENOSCOPY;  Surgeon: Brunner, Mark I, MD;  Location:  [facility];  Service: Gastroenterology;  Laterality: N/A;   • ENDOSCOPY N/A 07/27/2021    Procedure: ESOPHAGOGASTRODUODENOSCOPY;  Surgeon: Mikael Worthy MD;  Location:  [facility];  Service: Gastroenterology;  Laterality: N/A;   • ENDOSCOPY WITH JTUBE N/A 03/16/2022    Procedure: ESOPHAGOGASTRODUODENOSCOPY WITH JEJUNAL TUBE INSERTION;  Surgeon: Thom Glover MD;  Location:  [facility];  Service: Gastroenterology;  Laterality: N/A;   • UPPER GASTROINTESTINAL ENDOSCOPY         Family History: family history includes ADD / ADHD in an other family member; Anxiety disorder in an other family member; Arthritis in an other family member; Depression in an other family member; Diabetes in her brother and another family member; Heart disease in an other family member; Hyperlipidemia in her mother and another family member; Hypertension in her brother, mother, and another family member; Lung cancer in an other family member; Osteoporosis in an other family member.     Social History:  reports that she quit smoking about 3 years ago. Her smoking use included cigarettes. She has a 7.50 pack-year smoking history. She has never used smokeless tobacco. She reports current alcohol use of about 1.0 standard drink per week. She reports that she does not use drugs.  Social History     Social History Narrative   • Not on file       Medications:  Available home medication information reviewed.  (Not in a hospital admission)      No Known Allergies    Objective   Objective     Vital Signs:   Temp:  [97.8 °F (36.6 °C)] 97.8 °F (36.6 °C)  Heart Rate:  [100-106] 105  Resp:  [20]  20  BP: (173-227)/() 227/101       Physical Exam   Constitutional: Awake, alert  Eyes: PERRLA, sclerae anicteric, no conjunctival injection  HENT: NCAT, mucous membranes moist  Neck: Supple, no thyromegaly, no lymphadenopathy, trachea midline  Respiratory: Clear to auscultation bilaterally, nonlabored respirations   Cardiovascular: RRR, no murmurs, rubs, or gallops, palpable pedal pulses bilaterally  Gastrointestinal: Positive bowel sounds, soft, nontender, nondistended  Musculoskeletal: No bilateral ankle edema, no clubbing or cyanosis to extremities  Psychiatric: Appropriate affect, cooperative  Neurologic: Oriented x 3, strength symmetric in all extremities, Cranial Nerves grossly intact to confrontation, speech clear  Skin: No rashes      Result Review:  I have personally reviewed the results from the time of this admission to 4/30/2023 14:00 EDT and agree with these findings:  [x]  Laboratory list / accordion  []  Microbiology  [x]  Radiology  []  EKG/Telemetry   []  Cardiology/Vascular   []  Pathology  [x]  Old records  []  Other:        LAB RESULTS:      Lab 04/30/23  1109 04/30/23  1103   WBC  --  7.80   HEMOGLOBIN  --  10.9*   HEMOGLOBIN, POC 11.2*  --    HEMATOCRIT  --  32.9*   HEMATOCRIT POC 33*  --    PLATELETS  --  501*   NEUTROS ABS  --  6.73   IMMATURE GRANS (ABS)  --  0.03   LYMPHS ABS  --  0.77   MONOS ABS  --  0.24   EOS ABS  --  0.01   MCV  --  88.7   PROTIME  --  14.9*   INR  --  1.15*         Lab 04/30/23  1109 04/30/23  1103   SODIUM  --  137   POTASSIUM  --  3.5   CHLORIDE  --  96*   CO2  --  17.0*   ANION GAP  --  24.0*   BUN  --  21   CREATININE 1.20 1.22*   EGFR 50.7* 49.7*   GLUCOSE  --  616*   CALCIUM  --  10.0   MAGNESIUM  --  2.7*   PHOSPHORUS  --  2.3*   HEMOGLOBIN A1C  --  8.70*         Lab 04/30/23  1103   TOTAL PROTEIN 7.7   ALBUMIN 4.1   GLOBULIN 3.6   ALT (SGPT) 9   AST (SGOT) 7   BILIRUBIN 0.4   ALK PHOS 132*   LIPASE 88*         Lab 04/30/23  1103   HSTROP T 23*                  Lab 04/30/23  1145   FIO2 21   CARBOXYHEMOGLOBIN (VENOUS) 1.4     UA        12/7/2022    04:00 2/3/2023    13:32 3/30/2023    18:27   Urinalysis   Squamous Epithelial Cells, UA 0-2   0-2   0-2     Specific Gravity, UA 1.018   1.022   1.015     Ketones, UA Trace   Negative   15 mg/dL (1+)     Blood, UA Negative   Small (1+)   Trace     Leukocytes, UA Negative   Negative   Negative     Nitrite, UA Negative   Negative   Negative     RBC, UA 3-6   0-2   0-2     WBC, UA 0-2   0-2   0-2     Bacteria, UA None Seen   None Seen   None Seen         Microbiology Results (last 10 days)     ** No results found for the last 240 hours. **          CT Abdomen Pelvis With Contrast    Result Date: 4/30/2023  CT ABDOMEN PELVIS W CONTRAST Date of Exam: 4/30/2023 12:09 PM EDT Indication: abd pain and N/V. Comparison: CT abdomen and pelvis 3/30/2023 Technique: Axial CT images were obtained of the abdomen and pelvis following the uneventful intravenous administration of 85 mL [id number]-300. Reconstructed coronal and sagittal images were also obtained. Automated exposure control and iterative construction methods were used. Findings: Unremarkable appearance of the lower thorax, liver, gallbladder, bile ducts, spleen. Incidental note of pancreas divisum with otherwise unremarkable appearance of the pancreas. Redemonstration of diffuse thickening and multiple small nodules of the bilateral adrenal glands. There are couple subcentimeter round hypodensities in the left renal cortex which is too small to characterize, likely tiny renal cysts; otherwise grossly unremarkable appearance of the kidneys; previously seen mild hydronephrosis has resolved. Normal appearance of the ureters. There is greater than expected mild circumferential wall thickening of the urinary bladder which otherwise appears unremarkable. Normal appearance of the uterus and adnexa. There is scattered  colonic diverticulosis without evidence of diverticulitis. Normal  appendix. Unremarkable appearance of the terminal ileum. No evidence of bowel obstruction. No definite inflammatory change of the GI tract. Redemonstration of gastric stimulator device with pulse generator in the subcutaneous tissues of the right anterior mid abdomen. No abdominopelvic free fluid or conspicuous fat stranding. No pneumoperitoneum. Normal appearance of the vasculature. No lymphadenopathy. Unremarkable appearance of the body wall soft tissues. No acute or suspicious bony findings.     Impression: Impression: No acute abdominopelvic findings. There is greater than expected circumferential wall thickening of the urinary bladder and correlate with urinalysis to exclude cystitis. Scattered colonic diverticulosis without evidence of diverticulitis. Redemonstration of diffuse thickening and small nodularity of the bilateral adrenal glands, suboptimally characterized on this study. Electronically Signed: Gautam Minor  4/30/2023 12:32 PM EDT  Workstation ID: [id number]      Results for orders placed during the hospital encounter of 12/06/22    Adult Transthoracic Echo Complete W/ Cont if Necessary Per Protocol    Interpretation Summary  •  Left ventricular ejection fraction appears to be greater than 70%.  •  Left ventricular wall thickness is consistent with moderate concentric hypertrophy.  •  Left ventricular diastolic function is consistent with (grade I) impaired relaxation.  •  Estimated right ventricular systolic pressure from tricuspid regurgitation is mildly elevated (35-45 mmHg). Calculated right ventricular systolic pressure from tricuspid regurgitation is 39 mmHg.      Assessment & Plan   Assessment & Plan     Active Hospital Problems    Diagnosis  POA   • **Refractory nausea and vomiting [R11.2]  Yes   • HHS vs mild DKA [E11.00]  Yes   • CKD (chronic kidney disease) stage 3, GFR 30-59 ml/min [N18.30]  Yes   • Atrial fibrillation and flutter [I48.91, I48.92]  Yes     · Echocardiogram, 11/1/2019:  Left atrium 2.8 cm. Left ventricular systolic function is hyperdynamic (EF > 70). Left ventricular diastolic dysfunction (grade I) consistent with impaired relaxation. Left ventricular wall thickness is consistent with mild concentric hypertrophy.     • Gastroparesis [K31.84]  Yes   • Uncontrolled type 1 diabetes mellitus with hyperglycemia [E10.65]  Yes     dx'd age 38 .      • Hypertension [I10]  Yes   • Hyperlipidemia [E78.5]  Yes       61 yo F with hx of type 1 diabetes, Afib on Xarelto, iron deficiency anemia, gastroparesis, HTN, HLD, CVA, GERD, and tobacco abuse who presents due to intractable nausea, vomiting, and abdominal pain. She is found to have a glucose of 612. Admitted for further management.     Intractable N/V  HHS vs mild DKA  Gastroparesis  Type 1 diabetes mellitus, uncontrolled  --CT A/P no acute findings  --UA pending, bladder does appear thickened concerning for cystitis on CT scan and she complains of urinary frequency  --Continue IV Reglan every 6 hours  --Glucose 616, venous pH 7.4, CO2 17, anion gap 24  --Continue Glucommander protocol/insulin drip  --Serial labs  --IV fluids  --Follows with Endocrinology, HbA1c currently 8.7%; was previously on insulin pump but per last DC summary she was having issues with it so was discharged on long acting insulin which she reports taking    Hypertensive urgency  --Has not been able to keep her BP meds down at home due to N/V  --Resume home Coreg, Doxazosin  --PRN Hydralazine and Labetolol    CKD 3  --Cr 1.22 on admission, she has had wide variations in her Cr since 2022 but overall baseline Cr ~1.1-1.2  --Monitor    Afib  --Continue Coreg, Xarelto    Iron deficiency anemia  --Continue home PO iron    HTN  HLD  --Continue Coreg, Doxazosin  --Not on a statin    GERD  --Continue PPI      CODE STATUS:    Code Status and Medical Interventions:   Ordered at: 04/30/23 3867     Code Status (Patient has no pulse and is not breathing):    CPR (Attempt to  Resuscitate)     Medical Interventions (Patient has pulse or is breathing):    Full Support       Expected Discharge  Expected Discharge Date: 05/02/23       Rose Vasques MD  04/30/23

## 2023-05-01 ENCOUNTER — READMISSION MANAGEMENT (OUTPATIENT)
Dept: CALL CENTER | Facility: HOSPITAL | Age: 63
End: 2023-05-01
Payer: COMMERCIAL

## 2023-05-01 LAB
ANION GAP SERPL CALCULATED.3IONS-SCNC: 12 MMOL/L (ref 5–15)
ANION GAP SERPL CALCULATED.3IONS-SCNC: 13 MMOL/L (ref 5–15)
ANION GAP SERPL CALCULATED.3IONS-SCNC: 9 MMOL/L (ref 5–15)
ARTERIAL PATENCY WRIST A: POSITIVE
ATMOSPHERIC PRESS: ABNORMAL MM[HG]
BASE EXCESS BLDA CALC-SCNC: -2.5 MMOL/L (ref 0–2)
BDY SITE: ABNORMAL
BODY TEMPERATURE: 37 C
BUN SERPL-MCNC: 17 MG/DL (ref 8–23)
BUN SERPL-MCNC: 18 MG/DL (ref 8–23)
BUN SERPL-MCNC: 21 MG/DL (ref 8–23)
BUN/CREAT SERPL: 14.5 (ref 7–25)
BUN/CREAT SERPL: 15.6 (ref 7–25)
BUN/CREAT SERPL: 19.1 (ref 7–25)
CALCIUM SPEC-SCNC: 8.7 MG/DL (ref 8.6–10.5)
CALCIUM SPEC-SCNC: 8.8 MG/DL (ref 8.6–10.5)
CALCIUM SPEC-SCNC: 8.8 MG/DL (ref 8.6–10.5)
CHLORIDE SERPL-SCNC: 114 MMOL/L (ref 98–107)
CHLORIDE SERPL-SCNC: 115 MMOL/L (ref 98–107)
CHLORIDE SERPL-SCNC: 116 MMOL/L (ref 98–107)
CO2 BLDA-SCNC: 22.6 MMOL/L (ref 22–33)
CO2 SERPL-SCNC: 16 MMOL/L (ref 22–29)
CO2 SERPL-SCNC: 19 MMOL/L (ref 22–29)
CO2 SERPL-SCNC: 21 MMOL/L (ref 22–29)
COHGB MFR BLD: 1.3 % (ref 0–2)
CREAT SERPL-MCNC: 1.09 MG/DL (ref 0.57–1)
CREAT SERPL-MCNC: 1.1 MG/DL (ref 0.57–1)
CREAT SERPL-MCNC: 1.24 MG/DL (ref 0.57–1)
EGFRCR SERPLBLD CKD-EPI 2021: 48.7 ML/MIN/1.73
EGFRCR SERPLBLD CKD-EPI 2021: 56.2 ML/MIN/1.73
EGFRCR SERPLBLD CKD-EPI 2021: 56.8 ML/MIN/1.73
EPAP: 0
GLUCOSE BLDC GLUCOMTR-MCNC: 117 MG/DL (ref 70–130)
GLUCOSE BLDC GLUCOMTR-MCNC: 118 MG/DL (ref 70–130)
GLUCOSE BLDC GLUCOMTR-MCNC: 122 MG/DL (ref 70–130)
GLUCOSE BLDC GLUCOMTR-MCNC: 133 MG/DL (ref 70–130)
GLUCOSE BLDC GLUCOMTR-MCNC: 141 MG/DL (ref 70–130)
GLUCOSE BLDC GLUCOMTR-MCNC: 142 MG/DL (ref 70–130)
GLUCOSE BLDC GLUCOMTR-MCNC: 154 MG/DL (ref 70–130)
GLUCOSE BLDC GLUCOMTR-MCNC: 161 MG/DL (ref 70–130)
GLUCOSE BLDC GLUCOMTR-MCNC: 171 MG/DL (ref 70–130)
GLUCOSE BLDC GLUCOMTR-MCNC: 181 MG/DL (ref 70–130)
GLUCOSE BLDC GLUCOMTR-MCNC: 186 MG/DL (ref 70–130)
GLUCOSE BLDC GLUCOMTR-MCNC: 194 MG/DL (ref 70–130)
GLUCOSE BLDC GLUCOMTR-MCNC: 234 MG/DL (ref 70–130)
GLUCOSE SERPL-MCNC: 108 MG/DL (ref 65–99)
GLUCOSE SERPL-MCNC: 141 MG/DL (ref 65–99)
GLUCOSE SERPL-MCNC: 221 MG/DL (ref 65–99)
HCO3 BLDA-SCNC: 21.6 MMOL/L (ref 20–26)
HCT VFR BLD CALC: 27.7 % (ref 38–51)
HGB BLDA-MCNC: 9 G/DL (ref 14–18)
INHALED O2 CONCENTRATION: 21 %
IPAP: 0
MAGNESIUM SERPL-MCNC: 1.4 MG/DL (ref 1.6–2.4)
MAGNESIUM SERPL-MCNC: 1.6 MG/DL (ref 1.6–2.4)
MAGNESIUM SERPL-MCNC: 2.7 MG/DL (ref 1.6–2.4)
METHGB BLD QL: 0.4 % (ref 0–1.5)
MODALITY: ABNORMAL
NOTE: ABNORMAL
OXYHGB MFR BLDV: 96.3 % (ref 94–99)
PAW @ PEAK INSP FLOW SETTING VENT: 0 CMH2O
PCO2 BLDA: 33.3 MM HG (ref 35–45)
PCO2 TEMP ADJ BLD: 33.3 MM HG (ref 35–45)
PH BLDA: 7.42 PH UNITS (ref 7.35–7.45)
PH, TEMP CORRECTED: 7.42 PH UNITS
PHOSPHATE SERPL-MCNC: 0.9 MG/DL (ref 2.5–4.5)
PHOSPHATE SERPL-MCNC: 1.9 MG/DL (ref 2.5–4.5)
PHOSPHATE SERPL-MCNC: 3.1 MG/DL (ref 2.5–4.5)
PO2 BLDA: 89.3 MM HG (ref 83–108)
PO2 TEMP ADJ BLD: 89.3 MM HG (ref 83–108)
POTASSIUM SERPL-SCNC: 4.8 MMOL/L (ref 3.5–5.2)
POTASSIUM SERPL-SCNC: 5.1 MMOL/L (ref 3.5–5.2)
POTASSIUM SERPL-SCNC: 5.1 MMOL/L (ref 3.5–5.2)
SODIUM SERPL-SCNC: 142 MMOL/L (ref 136–145)
SODIUM SERPL-SCNC: 144 MMOL/L (ref 136–145)
SODIUM SERPL-SCNC: 149 MMOL/L (ref 136–145)
TOTAL RATE: 0 BREATHS/MINUTE

## 2023-05-01 PROCEDURE — 96376 TX/PRO/DX INJ SAME DRUG ADON: CPT

## 2023-05-01 PROCEDURE — 83735 ASSAY OF MAGNESIUM: CPT | Performed by: EMERGENCY MEDICINE

## 2023-05-01 PROCEDURE — 36600 WITHDRAWAL OF ARTERIAL BLOOD: CPT

## 2023-05-01 PROCEDURE — 25010000002 MAGNESIUM SULFATE 2 GM/50ML SOLUTION: Performed by: INTERNAL MEDICINE

## 2023-05-01 PROCEDURE — 84100 ASSAY OF PHOSPHORUS: CPT | Performed by: EMERGENCY MEDICINE

## 2023-05-01 PROCEDURE — 63710000001 INSULIN LISPRO (HUMAN) PER 5 UNITS: Performed by: INTERNAL MEDICINE

## 2023-05-01 PROCEDURE — 80048 BASIC METABOLIC PNL TOTAL CA: CPT | Performed by: EMERGENCY MEDICINE

## 2023-05-01 PROCEDURE — 25010000002 VANCOMYCIN 10 G RECONSTITUTED SOLUTION

## 2023-05-01 PROCEDURE — 82948 REAGENT STRIP/BLOOD GLUCOSE: CPT

## 2023-05-01 PROCEDURE — G0378 HOSPITAL OBSERVATION PER HR: HCPCS

## 2023-05-01 PROCEDURE — 82805 BLOOD GASES W/O2 SATURATION: CPT

## 2023-05-01 PROCEDURE — 25010000002 METOCLOPRAMIDE PER 10 MG: Performed by: HOSPITALIST

## 2023-05-01 PROCEDURE — 83050 HGB METHEMOGLOBIN QUAN: CPT

## 2023-05-01 PROCEDURE — 99232 SBSQ HOSP IP/OBS MODERATE 35: CPT | Performed by: INTERNAL MEDICINE

## 2023-05-01 PROCEDURE — 82375 ASSAY CARBOXYHB QUANT: CPT

## 2023-05-01 PROCEDURE — 96368 THER/DIAG CONCURRENT INF: CPT

## 2023-05-01 PROCEDURE — 63710000001 INSULIN DETEMIR PER 5 UNITS: Performed by: INTERNAL MEDICINE

## 2023-05-01 PROCEDURE — 96366 THER/PROPH/DIAG IV INF ADDON: CPT

## 2023-05-01 PROCEDURE — 96367 TX/PROPH/DG ADDL SEQ IV INF: CPT

## 2023-05-01 RX ORDER — DEXTROSE MONOHYDRATE 25 G/50ML
25 INJECTION, SOLUTION INTRAVENOUS
Status: DISCONTINUED | OUTPATIENT
Start: 2023-05-01 | End: 2023-05-01 | Stop reason: SDUPTHER

## 2023-05-01 RX ORDER — MAGNESIUM SULFATE HEPTAHYDRATE 40 MG/ML
2 INJECTION, SOLUTION INTRAVENOUS
Status: COMPLETED | OUTPATIENT
Start: 2023-05-01 | End: 2023-05-01

## 2023-05-01 RX ORDER — FAMOTIDINE 20 MG/1
20 TABLET, FILM COATED ORAL
Status: DISCONTINUED | OUTPATIENT
Start: 2023-05-02 | End: 2023-05-05 | Stop reason: HOSPADM

## 2023-05-01 RX ORDER — NICOTINE POLACRILEX 4 MG
15 LOZENGE BUCCAL
Status: DISCONTINUED | OUTPATIENT
Start: 2023-05-01 | End: 2023-05-01 | Stop reason: SDUPTHER

## 2023-05-01 RX ORDER — INSULIN LISPRO 100 [IU]/ML
0-7 INJECTION, SOLUTION INTRAVENOUS; SUBCUTANEOUS
Status: DISCONTINUED | OUTPATIENT
Start: 2023-05-01 | End: 2023-05-05 | Stop reason: HOSPADM

## 2023-05-01 RX ADMIN — FERROUS SULFATE TAB 325 MG (65 MG ELEMENTAL FE) 325 MG: 325 (65 FE) TAB at 09:15

## 2023-05-01 RX ADMIN — Medication 10 ML: at 20:20

## 2023-05-01 RX ADMIN — SODIUM PHOSPHATE, MONOBASIC, MONOHYDRATE AND SODIUM PHOSPHATE, DIBASIC, ANHYDROUS 15 MMOL: 276; 142 INJECTION, SOLUTION INTRAVENOUS at 11:50

## 2023-05-01 RX ADMIN — CARVEDILOL 6.25 MG: 6.25 TABLET, FILM COATED ORAL at 18:03

## 2023-05-01 RX ADMIN — METOCLOPRAMIDE 10 MG: 5 INJECTION, SOLUTION INTRAMUSCULAR; INTRAVENOUS at 11:47

## 2023-05-01 RX ADMIN — CARVEDILOL 6.25 MG: 6.25 TABLET, FILM COATED ORAL at 09:15

## 2023-05-01 RX ADMIN — VANCOMYCIN HYDROCHLORIDE 1250 MG: 10 INJECTION, POWDER, LYOPHILIZED, FOR SOLUTION INTRAVENOUS at 17:58

## 2023-05-01 RX ADMIN — PANTOPRAZOLE SODIUM 40 MG: 40 TABLET, DELAYED RELEASE ORAL at 06:23

## 2023-05-01 RX ADMIN — TERAZOSIN HYDROCHLORIDE 1 MG: 1 CAPSULE ORAL at 20:19

## 2023-05-01 RX ADMIN — INSULIN HUMAN 1.8 UNITS/HR: 1 INJECTION, SOLUTION INTRAVENOUS at 11:46

## 2023-05-01 RX ADMIN — MAGNESIUM SULFATE HEPTAHYDRATE 2 G: 2 INJECTION, SOLUTION INTRAVENOUS at 17:18

## 2023-05-01 RX ADMIN — INSULIN LISPRO 1 UNITS: 100 INJECTION, SOLUTION INTRAVENOUS; SUBCUTANEOUS at 18:00

## 2023-05-01 RX ADMIN — INSULIN DETEMIR 10 UNITS: 100 INJECTION, SOLUTION SUBCUTANEOUS at 11:47

## 2023-05-01 RX ADMIN — SODIUM PHOSPHATE, MONOBASIC, MONOHYDRATE AND SODIUM PHOSPHATE, DIBASIC, ANHYDROUS 15 MMOL: 276; 142 INJECTION, SOLUTION INTRAVENOUS at 06:24

## 2023-05-01 RX ADMIN — Medication 5 MG: at 20:24

## 2023-05-01 RX ADMIN — MIRTAZAPINE 7.5 MG: 15 TABLET, FILM COATED ORAL at 20:20

## 2023-05-01 RX ADMIN — INSULIN HUMAN 2.8 UNITS/HR: 1 INJECTION, SOLUTION INTRAVENOUS at 10:18

## 2023-05-01 RX ADMIN — METOCLOPRAMIDE 10 MG: 5 INJECTION, SOLUTION INTRAMUSCULAR; INTRAVENOUS at 18:03

## 2023-05-01 RX ADMIN — POTASSIUM CHLORIDE, DEXTROSE MONOHYDRATE AND SODIUM CHLORIDE 150 ML/HR: 150; 5; 450 INJECTION, SOLUTION INTRAVENOUS at 03:24

## 2023-05-01 RX ADMIN — Medication 1 CAPSULE: at 09:15

## 2023-05-01 RX ADMIN — MAGNESIUM SULFATE HEPTAHYDRATE 2 G: 2 INJECTION, SOLUTION INTRAVENOUS at 12:37

## 2023-05-01 RX ADMIN — RIVAROXABAN 20 MG: 20 TABLET, FILM COATED ORAL at 18:03

## 2023-05-01 RX ADMIN — SODIUM PHOSPHATE, MONOBASIC, MONOHYDRATE AND SODIUM PHOSPHATE, DIBASIC, ANHYDROUS 15 MMOL: 276; 142 INJECTION, SOLUTION INTRAVENOUS at 14:50

## 2023-05-01 RX ADMIN — MAGNESIUM SULFATE HEPTAHYDRATE 2 G: 2 INJECTION, SOLUTION INTRAVENOUS at 14:48

## 2023-05-01 RX ADMIN — METOCLOPRAMIDE 10 MG: 5 INJECTION, SOLUTION INTRAMUSCULAR; INTRAVENOUS at 06:23

## 2023-05-01 RX ADMIN — TRAMADOL HYDROCHLORIDE 50 MG: 50 TABLET, COATED ORAL at 11:53

## 2023-05-01 NOTE — CASE MANAGEMENT/SOCIAL WORK
Discharge Planning Assessment  [facility]     Patient Name: Thelma Michael  MRN: [id number]  Today's Date: 5/1/2023    Admit Date: 4/30/2023    Plan: Home with son   Discharge Needs Assessment     Row Name 05/01/23 0831       Living Environment    People in Home child(bronson), adult    Name(s) of People in Home Keshav Purcell (son) [phone]    Current Living Arrangements apartment    Potentially Unsafe Housing Conditions none    Primary Care Provided by self    Provides Primary Care For no one    Family Caregiver if Needed child(bronson), adult       Resource/Environmental Concerns    Resource/Environmental Concerns none    Transportation Concerns none       Food Insecurity    Within the past 12 months, you worried that your food would run out before you got the money to buy more. Never true    Within the past 12 months, the food you bought just didn't last and you didn't have money to get more. Never true       Transition Planning    Patient/Family Anticipates Transition to home with family    Patient/Family Anticipated Services at Transition none    Transportation Anticipated family or friend will provide       Discharge Needs Assessment    Readmission Within the Last 30 Days no previous admission in last 30 days    Equipment Currently Used at Home bp cuff;cane, straight;glucometer;walker, rolling    Concerns to be Addressed denies needs/concerns at this time    Anticipated Changes Related to Illness none    Equipment Needed After Discharge none               Discharge Plan     Row Name 05/01/23 0834       Plan    Plan Home with son    Patient/Family in Agreement with Plan yes    Plan Comments Spoke with patient by phone. Lives with Keshav Purcell (son) [phone] in [place]. Is independent with ADL's. No problems with [employer] or medications. Uses a BP cuff, straight cane, glucometer abd rolling walker at home. Has advanced directives. PCP is Shayne Clark MD. Plan is home with son. CM will  continue to follow.    Final Discharge Disposition Code 01 - home or self-care              Continued Care and Services - Admitted Since 4/30/2023    Coordination has not been started for this encounter.     Selected Continued Care - Prior Encounters Includes continued care and service providers with selected services from prior encounters from 1/30/2023 to 5/1/2023    Discharged on 2/6/2023 Admission date: 2/3/2023 - Discharge disposition: Home-Health Care Svc    Home Medical Care     Service Provider Selected Services Address Phone Fax Patient Preferred    [facility] [address] [address] [phone] --                    Expected Discharge Date and Time     Expected Discharge Date Expected Discharge Time    May 2, 2023          Demographic Summary     Row Name 05/01/23 0830       General Information    Admission Type inpatient    Arrived From emergency department    Referral Source admission list    Reason for Consult discharge planning    Preferred Language English       Contact Information    Permission Granted to Share Info With     Contact Information Obtained for                Functional Status     Row Name 05/01/23 0830       Functional Status    Usual Activity Tolerance good    Current Activity Tolerance good       Functional Status, IADL    Medications independent    Meal Preparation independent    Housekeeping independent    Laundry independent    Shopping independent       Mental Status    General Appearance WDL WDL       Mental Status Summary    Recent Changes in Mental Status/Cognitive Functioning no changes       Employment/    Employment Status retired               Psychosocial    No documentation.                Abuse/Neglect    No documentation.                Legal    No documentation.                Substance Abuse    No documentation.                Patient Forms    No documentation.                    Mook Sutton, RN

## 2023-05-01 NOTE — PROGRESS NOTES
[facility] Medicine Services  PROGRESS NOTE    Patient Name: Thelma Michael  : 1960  MRN: [id number]    Date of Admission: 2023  Primary Care Physician: Monet Howe APRN    Subjective   Subjective     CC:  F/u N/V    HPI:  Started on CLD this AM, does not like beef broth or lemon ice, so only trying a bit of jello. Does feel a lot better and interested in more food    ROS:  Gen- No fevers, chills  CV- No chest pain, palpitations  Resp- No cough, dyspnea  GI- No N/V/D, abd pain     Objective   Objective     Vital Signs:   Temp:  [99.2 °F (37.3 °C)-99.8 °F (37.7 °C)] 99.3 °F (37.4 °C)  Heart Rate:  [84-93] 86  Resp:  [18] 18  BP: (134-180)/(62-93) 180/93     Physical Exam:  Constitutional: Awake, alert, NAD sitting up in bed eating jello  HENT: NCAT, mucous membranes moist  Respiratory: Clear to auscultation bilaterally, respiratory effort normal   Cardiovascular: RRR, palpable radial pulses  Gastrointestinal: Positive bowel sounds, soft, nontender, nondistended  Musculoskeletal: No bilateral ankle edema  Psychiatric: Appropriate affect, cooperative  Neurologic: Speech clear and fluent    Results Reviewed:  LAB RESULTS:      Lab 23  1109 23  1103   WBC  --  7.80   HEMOGLOBIN  --  10.9*   HEMOGLOBIN, POC 11.2*  --    HEMATOCRIT  --  32.9*   HEMATOCRIT POC 33*  --    PLATELETS  --  501*   NEUTROS ABS  --  6.73   IMMATURE GRANS (ABS)  --  0.03   LYMPHS ABS  --  0.77   MONOS ABS  --  0.24   EOS ABS  --  0.01   MCV  --  88.7   PROTIME  --  14.9*         Lab 23  0942 23  0423 23  2353 23  1609 23  1109 23  1103   SODIUM 144 142 149* 143 142  --  137   POTASSIUM 4.8 5.1 5.1 3.9 3.2*  --  3.5   CHLORIDE 115* 114* 116* 111* 107  --  96*   CO2 16.0* 19.0* 21.0* 19.0* 15.0*  --  17.0*   ANION GAP 13.0 9.0 12.0 13.0 20.0*  --  24.0*   BUN 17 18 21 20 20  --  21   CREATININE 1.09* 1.24* 1.10* 1.19* 1.22*   <  > 1.22*   EGFR 56.8* 48.7* 56.2* 51.2* 49.7*   < > 49.7*   GLUCOSE 221* 141* 108* 147* 239*  --  616*   CALCIUM 8.7 8.8 8.8 9.0 9.5  --  10.0   MAGNESIUM 1.4* 2.7* 1.6 1.7 1.8  --  2.7*   PHOSPHORUS 3.1 0.9* 1.9* 1.2* 1.4*  --  2.3*   HEMOGLOBIN A1C  --   --   --   --   --   --  8.70*    < > = values in this interval not displayed.         Lab 04/30/23  1103   TOTAL PROTEIN 7.7   ALBUMIN 4.1   GLOBULIN 3.6   ALT (SGPT) 9   AST (SGOT) 7   BILIRUBIN 0.4   ALK PHOS 132*   LIPASE 88*         Lab 04/30/23  1103   HSTROP T 23*   PROTIME 14.9*   INR 1.15*                 Lab 05/01/23  0143 04/30/23  1145   PH, ARTERIAL 7.419  --    PCO2, ARTERIAL 33.3*  --    PO2 ART 89.3  --    FIO2 21 21   HCO3 ART 21.6  --    BASE EXCESS ART -2.5*  --    CARBOXYHEMOGLOBIN 1.3  --    CARBOXYHEMOGLOBIN (VENOUS)  --  1.4     Brief Urine Lab Results  (Last result in the past 365 days)      Color   Clarity   Blood   Leuk Est   Nitrite   Protein   CREAT   Urine HCG        04/30/23 1513 Yellow   Cloudy   Trace   Small (1+)   Negative   100 mg/dL (2+)                 Microbiology Results Abnormal     None          CT Abdomen Pelvis With Contrast    Result Date: 4/30/2023  CT ABDOMEN PELVIS W CONTRAST Date of Exam: 4/30/2023 12:09 PM EDT Indication: abd pain and N/V. Comparison: CT abdomen and pelvis 3/30/2023 Technique: Axial CT images were obtained of the abdomen and pelvis following the uneventful intravenous administration of 85 mL Isovue-300. Reconstructed coronal and sagittal images were also obtained. Automated exposure control and iterative construction methods were used. Findings: Unremarkable appearance of the lower thorax, liver, gallbladder, bile ducts, spleen. Incidental note of pancreas divisum with otherwise unremarkable appearance of the pancreas. Redemonstration of diffuse thickening and multiple small nodules of the bilateral adrenal glands. There are couple subcentimeter round hypodensities in the left renal cortex which is  too small to characterize, likely tiny renal cysts; otherwise grossly unremarkable appearance of the kidneys; previously seen mild hydronephrosis has resolved. Normal appearance of the ureters. There is greater than expected mild circumferential wall thickening of the urinary bladder which otherwise appears unremarkable. Normal appearance of the uterus and adnexa. There is scattered  colonic diverticulosis without evidence of diverticulitis. Normal appendix. Unremarkable appearance of the terminal ileum. No evidence of bowel obstruction. No definite inflammatory change of the GI tract. Redemonstration of gastric stimulator device with pulse generator in the subcutaneous tissues of the right anterior mid abdomen. No abdominopelvic free fluid or conspicuous fat stranding. No pneumoperitoneum. Normal appearance of the vasculature. No lymphadenopathy. Unremarkable appearance of the body wall soft tissues. No acute or suspicious bony findings.     Impression: Impression: No acute abdominopelvic findings. There is greater than expected circumferential wall thickening of the urinary bladder and correlate with urinalysis to exclude cystitis. Scattered colonic diverticulosis without evidence of diverticulitis. Redemonstration of diffuse thickening and small nodularity of the bilateral adrenal glands, suboptimally characterized on this study. Electronically Signed: Gautam Minor  4/30/2023 12:32 PM EDT  Workstation ID: [id number]      Results for orders placed during the hospital encounter of 12/06/22    Adult Transthoracic Echo Complete W/ Cont if Necessary Per Protocol    Interpretation Summary  •  Left ventricular ejection fraction appears to be greater than 70%.  •  Left ventricular wall thickness is consistent with moderate concentric hypertrophy.  •  Left ventricular diastolic function is consistent with (grade I) impaired relaxation.  •  Estimated right ventricular systolic pressure from tricuspid regurgitation is  mildly elevated (35-45 mmHg). Calculated right ventricular systolic pressure from tricuspid regurgitation is 39 mmHg.      Current medications:  Scheduled Meds:carvedilol, 6.25 mg, Oral, BID With Meals  cefTRIAXone, 1 g, Intravenous, Q24H  ferrous sulfate, 325 mg, Oral, Daily With Breakfast  insulin detemir, 10 Units, Subcutaneous, Daily  lactobacillus acidophilus, 1 capsule, Oral, Daily  magnesium sulfate, 2 g, Intravenous, Q2H  metoclopramide, 10 mg, Intravenous, Q6H  mirtazapine, 7.5 mg, Oral, Nightly  pantoprazole, 40 mg, Oral, Daily  rivaroxaban, 20 mg, Oral, Daily With Dinner  sodium chloride, 10 mL, Intravenous, Q12H  sodium chloride, 10 mL, Intravenous, Q12H  terazosin, 1 mg, Oral, Nightly      Continuous Infusions:sodium chloride, 250 mL/hr      PRN Meds:.•  acetaminophen  •  dextrose  •  dextrose  •  glucagon (human recombinant)  •  Magnesium Standard Dose Replacement - Follow Nurse / BPA Driven Protocol  •  melatonin  •  ondansetron **OR** ondansetron  •  Phosphorus Replacement - Follow Nurse / BPA Driven Protocol  •  Potassium Replacement - Follow Nurse / BPA Driven Protocol  •  [COMPLETED] Insert Peripheral IV **AND** sodium chloride  •  sodium chloride  •  sodium chloride  •  sodium chloride  •  sodium chloride  •  sodium chloride  •  traMADol    Assessment & Plan   Assessment & Plan     Active Hospital Problems    Diagnosis  POA   • **Refractory nausea and vomiting [R11.2]  Yes   • HHS vs mild DKA [E11.00]  Yes   • CKD (chronic kidney disease) stage 3, GFR 30-59 ml/min [N18.30]  Yes   • Hypertensive urgency [I16.0]  Yes   • Atrial fibrillation and flutter [I48.91, I48.92]  Yes   • Gastroparesis [K31.84]  Yes   • Uncontrolled type 1 diabetes mellitus with hyperglycemia [E10.65]  Yes   • Hypertension [I10]  Yes   • Hyperlipidemia [E78.5]  Yes      Resolved Hospital Problems   No resolved problems to display.        Brief Hospital Course to date:  Thelma Michael is a 62 y.o. female w/ Afib on  xarelto, iron def anemia, HTN, HLD, prior stroke, GERD, tobacco abuse, DM1 w/ gastroparesis, she is well known to this facility for recurrent admissions related to gastroparesis +/- DKA; she has not been taking her mealtime insulin citing that it doesn't work, and presented w/ N/V and admitted for DKA    The following problems are new to me today    Assessment/Plan    Uncontrolled DM type 1, a1c 8.7%, w/ DKA  Diabetic gastroparesis w/ intractable N/V  -insulin pump still nonfunctional; requires basal-bolus, has not been taking bolus  -ct a/p w/o acute finding  -prelim UrCx w/ enterococcus, change CTX to IV vanc (also has Hx Cdiff)  -trial of diet this AM, transition to subQ insulin    HTN urgency  -2/2 not being able to take her po meds  -cont coreg, terazosin  -avoid use of PRN bp meds    CKD stage 3  -BLine Cr 1.0-1.3; eGFR 40-50's  -approx baseline    Afib  -cont coreg, xarelto    GERD  -change ppi to pepcid w/ Hx cdiff and active Abx use    Iron def anemia, chronic      Expected Discharge Location and Transportation: home  Expected Discharge   Expected Discharge Date: 05/03/23       DVT prophylaxis:  Medical DVT prophylaxis orders are present.          CODE STATUS:   Code Status and Medical Interventions:   Ordered at: 04/30/23 1335     Code Status (Patient has no pulse and is not breathing):    CPR (Attempt to Resuscitate)     Medical Interventions (Patient has pulse or is breathing):    Full Support       Kavon Cramer, DO  05/01/23

## 2023-05-01 NOTE — PLAN OF CARE
Goal Outcome Evaluation:              Outcome Evaluation: Off Insulin drip w/ BG<200. VSS (hypertensive but WDL). NSR/SA on telemetry. Temp elevated. Antibiotic changed per MD. Phos & Mg++ repleted.

## 2023-05-01 NOTE — PROGRESS NOTES
"Pharmacy Consult-Vancomycin Dosing  Thelma Michael is a  62 y.o. female receiving vancomycin therapy.     Indication: urinary tract infection  Consulting Provider: hospital medicine  ID Consult: no    Goal AUC: 400 - 600 mg/L*hr    Current Antimicrobial Therapy  Anti-Infectives (From admission, onward)      Ordered     Dose/Rate Route Frequency Start Stop    05/01/23 1708  vancomycin 1250 mg/250 mL 0.9% NS IVPB ([facility])        Ordering Provider: Derick Jerome PharmD    1,250 mg  166.7 mL/hr over 90 Minutes Intravenous Every 24 Hours 05/02/23 0900 05/07/23 0859    05/01/23 1657  vancomycin 1250 mg/250 mL 0.9% NS IVPB ([facility])        Ordering Provider: Derick Jerome PharmD    1,250 mg  166.7 mL/hr over 90 Minutes Intravenous Once 05/01/23 1745      05/01/23 1650  Pharmacy to dose vancomycin        Ordering Provider: Kavon Cramer,      Does not apply Continuous PRN 05/01/23 1650 05/06/23 1649            Allergies  Allergies as of 04/30/2023    (No Known Allergies)       Labs    Results from last 7 days   Lab Units 05/01/23  0942 05/01/23  0423 04/30/23  2353   BUN mg/dL 17 18 21   CREATININE mg/dL 1.09* 1.24* 1.10*       Results from last 7 days   Lab Units 04/30/23  1103   WBC 10*3/mm3 7.80       Evaluation of Dosing     Last Dose Received in the ED/Outside Facility: na  Is Patient on Dialysis or Renal Replacement: no    Ht - 172.7 cm (68\")  Wt - 61.2 kg (135 lb)    Estimated Creatinine Clearance: 50.4 mL/min (A) (by C-G formula based on SCr of 1.09 mg/dL (H)).    Intake & Output (last 3 days)         04/28 0701 04/29 0700 04/29 0701 04/30 0700 04/30 0701 05/01 0700 05/01 0701 05/02 0700    P.O.   200     I.V. (mL/kg)    2411.6 (39.4)    Total Intake(mL/kg)   200 (3.3) 2411.6 (39.4)    Net   +200 +2411.6            Urine Unmeasured Occurrence   2 x     Stool Unmeasured Occurrence   1 x             Microbiology and Radiology  Microbiology Results (last 10 days)       Procedure Component Value - " Date/Time    Urine Culture - Urine, Urine, Clean Catch [[id number]]  (Abnormal) Collected: 04/30/23 1513    Lab Status: Preliminary result Specimen: Urine, Clean Catch Updated: 05/01/23 1102     Urine Culture >100,000 CFU/mL Enterococcus species    Narrative:      Colonization of the urinary tract without infection is common. Treatment is discouraged unless the patient is symptomatic, pregnant, or undergoing an invasive urologic procedure.            Reported Vancomycin Levels                         [employer]ight[employer] AUC Calculation:    Current AUC:   na mg/L*hr    Predicted Steady State AUC on Current Dose: na mg/L*hr  _________________________________    Predicted Steady State AUC on New Dose:   500 mg/L*hr associated steady state trough 14.7 mg/L    Assessment/Plan:  Will give onetime dose of vancomycin 1250 mg(dose: 20 mg/kg) this afternoon and then start scheduled vancomycin at 1250 mg iv daily; will check vancomycin trough prior to am dose on 5/3 to verify vancomycin pharmacokinetic parameters.  Derick Jerome, PharmD

## 2023-05-01 NOTE — OUTREACH NOTE
Medical Week 3 Survey    Flowsheet Row Responses   [facility] patient discharged from? [facility]   Does the patient have one of the following disease processes/diagnoses(primary or secondary)? Other   Week 3 attempt successful? No   Unsuccessful attempts Attempt 1   Revoke Readmitted          Karie BIRCH - Registered Nurse

## 2023-05-02 LAB
ANION GAP SERPL CALCULATED.3IONS-SCNC: 11 MMOL/L (ref 5–15)
BACTERIA SPEC AEROBE CULT: ABNORMAL
BUN SERPL-MCNC: 9 MG/DL (ref 8–23)
BUN/CREAT SERPL: 9.5 (ref 7–25)
CALCIUM SPEC-SCNC: 8.1 MG/DL (ref 8.6–10.5)
CHLORIDE SERPL-SCNC: 112 MMOL/L (ref 98–107)
CO2 SERPL-SCNC: 20 MMOL/L (ref 22–29)
CREAT SERPL-MCNC: 0.95 MG/DL (ref 0.57–1)
EGFRCR SERPLBLD CKD-EPI 2021: 67 ML/MIN/1.73
GLUCOSE BLDC GLUCOMTR-MCNC: 115 MG/DL (ref 70–130)
GLUCOSE BLDC GLUCOMTR-MCNC: 203 MG/DL (ref 70–130)
GLUCOSE BLDC GLUCOMTR-MCNC: 96 MG/DL (ref 70–130)
GLUCOSE SERPL-MCNC: 94 MG/DL (ref 65–99)
MAGNESIUM SERPL-MCNC: 2.6 MG/DL (ref 1.6–2.4)
PHOSPHATE SERPL-MCNC: 4.4 MG/DL (ref 2.5–4.5)
POTASSIUM SERPL-SCNC: 3.6 MMOL/L (ref 3.5–5.2)
POTASSIUM SERPL-SCNC: 4.1 MMOL/L (ref 3.5–5.2)
SODIUM SERPL-SCNC: 143 MMOL/L (ref 136–145)

## 2023-05-02 PROCEDURE — 83735 ASSAY OF MAGNESIUM: CPT | Performed by: INTERNAL MEDICINE

## 2023-05-02 PROCEDURE — 96376 TX/PRO/DX INJ SAME DRUG ADON: CPT

## 2023-05-02 PROCEDURE — 63710000001 ONDANSETRON PER 8 MG: Performed by: HOSPITALIST

## 2023-05-02 PROCEDURE — 84132 ASSAY OF SERUM POTASSIUM: CPT | Performed by: INTERNAL MEDICINE

## 2023-05-02 PROCEDURE — 84100 ASSAY OF PHOSPHORUS: CPT | Performed by: INTERNAL MEDICINE

## 2023-05-02 PROCEDURE — 99232 SBSQ HOSP IP/OBS MODERATE 35: CPT | Performed by: INTERNAL MEDICINE

## 2023-05-02 PROCEDURE — 63710000001 INSULIN DETEMIR PER 5 UNITS: Performed by: INTERNAL MEDICINE

## 2023-05-02 PROCEDURE — 96366 THER/PROPH/DIAG IV INF ADDON: CPT

## 2023-05-02 PROCEDURE — 25010000002 VANCOMYCIN 10 G RECONSTITUTED SOLUTION

## 2023-05-02 PROCEDURE — 25010000002 METOCLOPRAMIDE PER 10 MG: Performed by: HOSPITALIST

## 2023-05-02 PROCEDURE — 80048 BASIC METABOLIC PNL TOTAL CA: CPT | Performed by: INTERNAL MEDICINE

## 2023-05-02 PROCEDURE — G0378 HOSPITAL OBSERVATION PER HR: HCPCS

## 2023-05-02 PROCEDURE — 82948 REAGENT STRIP/BLOOD GLUCOSE: CPT

## 2023-05-02 PROCEDURE — 63710000001 INSULIN LISPRO (HUMAN) PER 5 UNITS: Performed by: INTERNAL MEDICINE

## 2023-05-02 RX ORDER — METOCLOPRAMIDE 10 MG/1
10 TABLET ORAL
Status: DISCONTINUED | OUTPATIENT
Start: 2023-05-02 | End: 2023-05-05 | Stop reason: HOSPADM

## 2023-05-02 RX ORDER — GRANULES FOR ORAL 3 G/1
3 POWDER ORAL ONCE
Status: COMPLETED | OUTPATIENT
Start: 2023-05-02 | End: 2023-05-02

## 2023-05-02 RX ORDER — POTASSIUM CHLORIDE 750 MG/1
40 CAPSULE, EXTENDED RELEASE ORAL EVERY 4 HOURS
Status: DISPENSED | OUTPATIENT
Start: 2023-05-02 | End: 2023-05-02

## 2023-05-02 RX ADMIN — FERROUS SULFATE TAB 325 MG (65 MG ELEMENTAL FE) 325 MG: 325 (65 FE) TAB at 08:36

## 2023-05-02 RX ADMIN — METOCLOPRAMIDE 10 MG: 5 INJECTION, SOLUTION INTRAMUSCULAR; INTRAVENOUS at 06:20

## 2023-05-02 RX ADMIN — METOCLOPRAMIDE 10 MG: 5 INJECTION, SOLUTION INTRAMUSCULAR; INTRAVENOUS at 12:34

## 2023-05-02 RX ADMIN — Medication 10 ML: at 08:35

## 2023-05-02 RX ADMIN — CARVEDILOL 6.25 MG: 6.25 TABLET, FILM COATED ORAL at 17:09

## 2023-05-02 RX ADMIN — METOCLOPRAMIDE 10 MG: 5 INJECTION, SOLUTION INTRAMUSCULAR; INTRAVENOUS at 00:27

## 2023-05-02 RX ADMIN — METOCLOPRAMIDE 10 MG: 10 TABLET ORAL at 18:13

## 2023-05-02 RX ADMIN — GRANULES FOR ORAL SOLUTION 3 G: 3 POWDER ORAL at 17:09

## 2023-05-02 RX ADMIN — FAMOTIDINE 20 MG: 20 TABLET, FILM COATED ORAL at 17:09

## 2023-05-02 RX ADMIN — METOCLOPRAMIDE 10 MG: 10 TABLET ORAL at 20:03

## 2023-05-02 RX ADMIN — Medication 5 MG: at 20:03

## 2023-05-02 RX ADMIN — INSULIN LISPRO 2 UNITS: 100 INJECTION, SOLUTION INTRAVENOUS; SUBCUTANEOUS at 12:33

## 2023-05-02 RX ADMIN — TRAMADOL HYDROCHLORIDE 50 MG: 50 TABLET, COATED ORAL at 12:43

## 2023-05-02 RX ADMIN — Medication 1 CAPSULE: at 08:36

## 2023-05-02 RX ADMIN — RIVAROXABAN 20 MG: 20 TABLET, FILM COATED ORAL at 17:09

## 2023-05-02 RX ADMIN — INSULIN DETEMIR 10 UNITS: 100 INJECTION, SOLUTION SUBCUTANEOUS at 08:39

## 2023-05-02 RX ADMIN — TRAMADOL HYDROCHLORIDE 50 MG: 50 TABLET, COATED ORAL at 18:13

## 2023-05-02 RX ADMIN — MIRTAZAPINE 7.5 MG: 15 TABLET, FILM COATED ORAL at 20:03

## 2023-05-02 RX ADMIN — POTASSIUM CHLORIDE 40 MEQ: 750 CAPSULE, EXTENDED RELEASE ORAL at 14:30

## 2023-05-02 RX ADMIN — TERAZOSIN HYDROCHLORIDE 1 MG: 1 CAPSULE ORAL at 20:03

## 2023-05-02 RX ADMIN — CARVEDILOL 6.25 MG: 6.25 TABLET, FILM COATED ORAL at 08:36

## 2023-05-02 RX ADMIN — VANCOMYCIN HYDROCHLORIDE 1250 MG: 10 INJECTION, POWDER, LYOPHILIZED, FOR SOLUTION INTRAVENOUS at 08:49

## 2023-05-02 RX ADMIN — FAMOTIDINE 20 MG: 20 TABLET, FILM COATED ORAL at 08:36

## 2023-05-02 RX ADMIN — ONDANSETRON HYDROCHLORIDE 4 MG: 4 TABLET, FILM COATED ORAL at 17:09

## 2023-05-02 NOTE — PROGRESS NOTES
[facility] Medicine Services  PROGRESS NOTE    Patient Name: Thelma Michael  : 1960  MRN: [id number]    Date of Admission: 2023  Primary Care Physician: Monet Howe APRN    Subjective   Subjective     CC:  F/u dka    HPI:  Feeling better, had some abd discomfort overnight. Not sure she is well enough to go home today    ROS:  Gen- No fevers, chills  CV- No chest pain, palpitations  Resp- No cough, dyspnea  GI- No N/V/D, abd pain     Objective   Objective     Vital Signs:   Temp:  [98.2 °F (36.8 °C)-100 °F (37.8 °C)] 98.9 °F (37.2 °C)  Heart Rate:  [75-92] 78  Resp:  [18] 18  BP: (117-174)/(53-79) 168/79     Physical Exam:  Constitutional: Awake, alert, laying in bed in NAD  HENT: NCAT, mucous membranes moist  Respiratory: Clear to auscultation bilaterally, respiratory effort normal   Cardiovascular: RRR, palpable radial pulses  Gastrointestinal: Positive bowel sounds, soft, nontender, nondistended  Musculoskeletal: No bilateral ankle edema  Psychiatric: Appropriate affect, cooperative  Neurologic: Speech clear and fluent    Results Reviewed:  LAB RESULTS:      Lab 23  1109 23  1103   WBC  --  7.80   HEMOGLOBIN  --  10.9*   HEMOGLOBIN, POC 11.2*  --    HEMATOCRIT  --  32.9*   HEMATOCRIT POC 33*  --    PLATELETS  --  501*   NEUTROS ABS  --  6.73   IMMATURE GRANS (ABS)  --  0.03   LYMPHS ABS  --  0.77   MONOS ABS  --  0.24   EOS ABS  --  0.01   MCV  --  88.7   PROTIME  --  14.9*         Lab 23  0517 23  0036 23  0942 23  0423 23  2353 23  1109 23  1103   SODIUM 143  --  144 142 149* 143   < > 137   POTASSIUM 3.6  --  4.8 5.1 5.1 3.9   < > 3.5   CHLORIDE 112*  --  115* 114* 116* 111*   < > 96*   CO2 20.0*  --  16.0* 19.0* 21.0* 19.0*   < > 17.0*   ANION GAP 11.0  --  13.0 9.0 12.0 13.0   < > 24.0*   BUN 9  --  17 18 21 20   < > 21   CREATININE 0.95  --  1.09* 1.24* 1.10* 1.19*   < > 1.22*    EGFR 67.0  --  56.8* 48.7* 56.2* 51.2*   < > 49.7*   GLUCOSE 94  --  221* 141* 108* 147*   < > 616*   CALCIUM 8.1*  --  8.7 8.8 8.8 9.0   < > 10.0   MAGNESIUM 2.6*  --  1.4* 2.7* 1.6 1.7   < > 2.7*   PHOSPHORUS  --  4.4 3.1 0.9* 1.9* 1.2*   < > 2.3*   HEMOGLOBIN A1C  --   --   --   --   --   --   --  8.70*    < > = values in this interval not displayed.         Lab 04/30/23  1103   TOTAL PROTEIN 7.7   ALBUMIN 4.1   GLOBULIN 3.6   ALT (SGPT) 9   AST (SGOT) 7   BILIRUBIN 0.4   ALK PHOS 132*   LIPASE 88*         Lab 04/30/23  1103   HSTROP T 23*   PROTIME 14.9*   INR 1.15*                 Lab 05/01/23  0143 04/30/23  1145   PH, ARTERIAL 7.419  --    PCO2, ARTERIAL 33.3*  --    PO2 ART 89.3  --    FIO2 21 21   HCO3 ART 21.6  --    BASE EXCESS ART -2.5*  --    CARBOXYHEMOGLOBIN 1.3  --    CARBOXYHEMOGLOBIN (VENOUS)  --  1.4     Brief Urine Lab Results  (Last result in the past 365 days)      Color   Clarity   Blood   Leuk Est   Nitrite   Protein   CREAT   Urine HCG        04/30/23 1513 Yellow   Cloudy   Trace   Small (1+)   Negative   100 mg/dL (2+)                 Microbiology Results Abnormal     None          No radiology results from the last 24 hrs    Results for orders placed during the hospital encounter of 12/06/22    Adult Transthoracic Echo Complete W/ Cont if Necessary Per Protocol    Interpretation Summary  •  Left ventricular ejection fraction appears to be greater than 70%.  •  Left ventricular wall thickness is consistent with moderate concentric hypertrophy.  •  Left ventricular diastolic function is consistent with (grade I) impaired relaxation.  •  Estimated right ventricular systolic pressure from tricuspid regurgitation is mildly elevated (35-45 mmHg). Calculated right ventricular systolic pressure from tricuspid regurgitation is 39 mmHg.      Current medications:  Scheduled Meds:[START ON 5/3/2023] Pharmacy Consult, , Does not apply, Once  carvedilol, 6.25 mg, Oral, BID With Meals  famotidine, 20  mg, Oral, BID AC  ferrous sulfate, 325 mg, Oral, Daily With Breakfast  insulin detemir, 10 Units, Subcutaneous, Daily  insulin lispro, 0-7 Units, Subcutaneous, TID AC  lactobacillus acidophilus, 1 capsule, Oral, Daily  metoclopramide, 10 mg, Intravenous, Q6H  mirtazapine, 7.5 mg, Oral, Nightly  potassium chloride, 40 mEq, Oral, Q4H  rivaroxaban, 20 mg, Oral, Daily With Dinner  sodium chloride, 10 mL, Intravenous, Q12H  sodium chloride, 10 mL, Intravenous, Q12H  terazosin, 1 mg, Oral, Nightly  vancomycin, 1,250 mg, Intravenous, Q24H      Continuous Infusions:Pharmacy to dose vancomycin,   sodium chloride, 250 mL/hr      PRN Meds:.•  acetaminophen  •  dextrose  •  dextrose  •  glucagon (human recombinant)  •  Magnesium Standard Dose Replacement - Follow Nurse / BPA Driven Protocol  •  melatonin  •  ondansetron **OR** ondansetron  •  Pharmacy to dose vancomycin  •  Phosphorus Replacement - Follow Nurse / BPA Driven Protocol  •  Potassium Replacement - Follow Nurse / BPA Driven Protocol  •  [COMPLETED] Insert Peripheral IV **AND** sodium chloride  •  sodium chloride  •  sodium chloride  •  sodium chloride  •  sodium chloride  •  traMADol    Assessment & Plan   Assessment & Plan     Active Hospital Problems    Diagnosis  POA   • **Refractory nausea and vomiting [R11.2]  Yes   • HHS vs mild DKA [E11.00]  Yes   • CKD (chronic kidney disease) stage 3, GFR 30-59 ml/min [N18.30]  Yes   • Hypertensive urgency [I16.0]  Yes   • Atrial fibrillation and flutter [I48.91, I48.92]  Yes   • Gastroparesis [K31.84]  Yes   • Uncontrolled type 1 diabetes mellitus with hyperglycemia [E10.65]  Yes   • Hypertension [I10]  Yes   • Hyperlipidemia [E78.5]  Yes      Resolved Hospital Problems   No resolved problems to display.        Brief Hospital Course to date:  Thelma Michael is a 62 y.o. female w/ Afib on xarelto, iron def anemia, HTN, HLD, prior stroke, GERD, tobacco abuse, DM1 w/ gastroparesis, she is well known to this facility for  recurrent admissions related to gastroparesis +/- DKA; she has not been taking her mealtime insulin citing that it doesn't work, and presented w/ N/V and admitted for DKA    Assessment/Plan    Uncontrolled DM type 1, a1c 8.7%, s/p DKA  Diabetic gastroparesis w/ intractable N/V  -insulin pump still nonfunctional; requires basal-bolus, has not been taking bolus  -ct a/p w/o acute finding  -cont subQ insulin  -advance diet to gastroparesis diet    Enterococcal UTI  -changed to vanc per prelim Cx yesterday, final sensitivities resulted; DC vanc and ordered single dose Fosfomycin    HTN urgency, improved  -2/2 not being able to take her po meds  -cont coreg, terazosin  -avoid use of PRN bp meds    CKD stage 3  -BLine Cr 1.0-1.3; eGFR 40-50's  -approx baseline    Afib  -cont coreg, xarelto    GERD  -cont pepcid w/ Hx cdiff and active Abx use    Iron def anemia, chronic      Expected Discharge Location and Transportation: home  Expected Discharge   Expected Discharge Date: 5/3/2023; Expected Discharge Time:      DVT prophylaxis:  Medical DVT prophylaxis orders are present.          CODE STATUS:   Code Status and Medical Interventions:   Ordered at: 04/30/23 1335     Code Status (Patient has no pulse and is not breathing):    CPR (Attempt to Resuscitate)     Medical Interventions (Patient has pulse or is breathing):    Full Support       Kavon Cramer, DO  05/02/23

## 2023-05-03 LAB
GLUCOSE BLDC GLUCOMTR-MCNC: 126 MG/DL (ref 70–130)
GLUCOSE BLDC GLUCOMTR-MCNC: 146 MG/DL (ref 70–130)
GLUCOSE BLDC GLUCOMTR-MCNC: 153 MG/DL (ref 70–130)
GLUCOSE BLDC GLUCOMTR-MCNC: 253 MG/DL (ref 70–130)

## 2023-05-03 PROCEDURE — 63710000001 INSULIN LISPRO (HUMAN) PER 5 UNITS: Performed by: INTERNAL MEDICINE

## 2023-05-03 PROCEDURE — G0378 HOSPITAL OBSERVATION PER HR: HCPCS

## 2023-05-03 PROCEDURE — 82948 REAGENT STRIP/BLOOD GLUCOSE: CPT

## 2023-05-03 PROCEDURE — 99231 SBSQ HOSP IP/OBS SF/LOW 25: CPT | Performed by: INTERNAL MEDICINE

## 2023-05-03 PROCEDURE — 63710000001 INSULIN DETEMIR PER 5 UNITS: Performed by: INTERNAL MEDICINE

## 2023-05-03 RX ORDER — CARVEDILOL 12.5 MG/1
12.5 TABLET ORAL 2 TIMES DAILY WITH MEALS
Status: DISCONTINUED | OUTPATIENT
Start: 2023-05-03 | End: 2023-05-05 | Stop reason: HOSPADM

## 2023-05-03 RX ORDER — LOSARTAN POTASSIUM 50 MG/1
50 TABLET ORAL
Status: DISCONTINUED | OUTPATIENT
Start: 2023-05-03 | End: 2023-05-04

## 2023-05-03 RX ADMIN — TRAMADOL HYDROCHLORIDE 50 MG: 50 TABLET, COATED ORAL at 07:31

## 2023-05-03 RX ADMIN — TERAZOSIN HYDROCHLORIDE 1 MG: 1 CAPSULE ORAL at 20:34

## 2023-05-03 RX ADMIN — METOCLOPRAMIDE 10 MG: 10 TABLET ORAL at 17:42

## 2023-05-03 RX ADMIN — FAMOTIDINE 20 MG: 20 TABLET, FILM COATED ORAL at 06:45

## 2023-05-03 RX ADMIN — INSULIN DETEMIR 10 UNITS: 100 INJECTION, SOLUTION SUBCUTANEOUS at 08:48

## 2023-05-03 RX ADMIN — METOCLOPRAMIDE 10 MG: 10 TABLET ORAL at 20:34

## 2023-05-03 RX ADMIN — METOCLOPRAMIDE 10 MG: 10 TABLET ORAL at 12:12

## 2023-05-03 RX ADMIN — CARVEDILOL 12.5 MG: 12.5 TABLET, FILM COATED ORAL at 17:42

## 2023-05-03 RX ADMIN — INSULIN LISPRO 1 UNITS: 100 INJECTION, SOLUTION INTRAVENOUS; SUBCUTANEOUS at 12:12

## 2023-05-03 RX ADMIN — FAMOTIDINE 20 MG: 20 TABLET, FILM COATED ORAL at 17:42

## 2023-05-03 RX ADMIN — LOSARTAN POTASSIUM 50 MG: 50 TABLET, FILM COATED ORAL at 12:12

## 2023-05-03 RX ADMIN — CARVEDILOL 6.25 MG: 6.25 TABLET, FILM COATED ORAL at 08:48

## 2023-05-03 RX ADMIN — METOCLOPRAMIDE 10 MG: 10 TABLET ORAL at 06:45

## 2023-05-03 RX ADMIN — FERROUS SULFATE TAB 325 MG (65 MG ELEMENTAL FE) 325 MG: 325 (65 FE) TAB at 08:48

## 2023-05-03 RX ADMIN — MIRTAZAPINE 7.5 MG: 15 TABLET, FILM COATED ORAL at 20:34

## 2023-05-03 RX ADMIN — Medication 5 MG: at 20:45

## 2023-05-03 RX ADMIN — RIVAROXABAN 20 MG: 20 TABLET, FILM COATED ORAL at 17:42

## 2023-05-03 RX ADMIN — Medication 1 CAPSULE: at 08:48

## 2023-05-03 NOTE — PROGRESS NOTES
[facility] Medicine Services  PROGRESS NOTE    Patient Name: Thelma Michael  : 1960  MRN: [id number]    Date of Admission: 2023  Primary Care Physician: Monet Howe APRN    Subjective   Subjective     CC:  F/u htn    HPI:  Feels well, no acute complaints. BP elevated this AM. Baseline chronic nausea/bloating    ROS:  Gen- No fevers, chills  CV- No chest pain, palpitations  Resp- No cough, dyspnea  GI- No N/V/D, abd pain     Objective   Objective     Vital Signs:   Temp:  [98.5 °F (36.9 °C)-99.6 °F (37.6 °C)] 98.5 °F (36.9 °C)  Heart Rate:  [68-83] 71  Resp:  [16-18] 16  BP: (140-213)/() 168/83     Physical Exam:  Constitutional: Awake, alert, laying in bed, NAD  HENT: NCAT, mucous membranes moist  Respiratory: Clear to auscultation bilaterally, respiratory effort normal   Cardiovascular: RRR, palpable radial pulses  Gastrointestinal: Positive bowel sounds, soft, nontender, nondistended  Musculoskeletal: No bilateral ankle edema  Psychiatric: Appropriate affect, cooperative  Neurologic: Speech clear and fluent    Results Reviewed:  LAB RESULTS:      Lab 23  1109 23  1103   WBC  --  7.80   HEMOGLOBIN  --  10.9*   HEMOGLOBIN, POC 11.2*  --    HEMATOCRIT  --  32.9*   HEMATOCRIT POC 33*  --    PLATELETS  --  501*   NEUTROS ABS  --  6.73   IMMATURE GRANS (ABS)  --  0.03   LYMPHS ABS  --  0.77   MONOS ABS  --  0.24   EOS ABS  --  0.01   MCV  --  88.7   PROTIME  --  14.9*         Lab 23  2255 23  0517 23  0036 23  0942 23  0423 23  2353 23  1109 23  1103   SODIUM  --  143  --  144 142 149* 143   < > 137   POTASSIUM 4.1 3.6  --  4.8 5.1 5.1 3.9   < > 3.5   CHLORIDE  --  112*  --  115* 114* 116* 111*   < > 96*   CO2  --  20.0*  --  16.0* 19.0* 21.0* 19.0*   < > 17.0*   ANION GAP  --  11.0  --  13.0 9.0 12.0 13.0   < > 24.0*   BUN  --  9  --  17 18 21 20   < > 21   CREATININE  --  0.95   --  1.09* 1.24* 1.10* 1.19*   < > 1.22*   EGFR  --  67.0  --  56.8* 48.7* 56.2* 51.2*   < > 49.7*   GLUCOSE  --  94  --  221* 141* 108* 147*   < > 616*   CALCIUM  --  8.1*  --  8.7 8.8 8.8 9.0   < > 10.0   MAGNESIUM  --  2.6*  --  1.4* 2.7* 1.6 1.7   < > 2.7*   PHOSPHORUS  --   --  4.4 3.1 0.9* 1.9* 1.2*   < > 2.3*   HEMOGLOBIN A1C  --   --   --   --   --   --   --   --  8.70*    < > = values in this interval not displayed.         Lab 04/30/23  1103   TOTAL PROTEIN 7.7   ALBUMIN 4.1   GLOBULIN 3.6   ALT (SGPT) 9   AST (SGOT) 7   BILIRUBIN 0.4   ALK PHOS 132*   LIPASE 88*         Lab 04/30/23  1103   HSTROP T 23*   PROTIME 14.9*   INR 1.15*                 Lab 05/01/23  0143 04/30/23  1145   PH, ARTERIAL 7.419  --    PCO2, ARTERIAL 33.3*  --    PO2 ART 89.3  --    FIO2 21 21   HCO3 ART 21.6  --    BASE EXCESS ART -2.5*  --    CARBOXYHEMOGLOBIN 1.3  --    CARBOXYHEMOGLOBIN (VENOUS)  --  1.4     Brief Urine Lab Results  (Last result in the past 365 days)      Color   Clarity   Blood   Leuk Est   Nitrite   Protein   CREAT   Urine HCG        04/30/23 1513 Yellow   Cloudy   Trace   Small (1+)   Negative   100 mg/dL (2+)                 Microbiology Results Abnormal     None          No radiology results from the last 24 hrs    Results for orders placed during the hospital encounter of 12/06/22    Adult Transthoracic Echo Complete W/ Cont if Necessary Per Protocol    Interpretation Summary  •  Left ventricular ejection fraction appears to be greater than 70%.  •  Left ventricular wall thickness is consistent with moderate concentric hypertrophy.  •  Left ventricular diastolic function is consistent with (grade I) impaired relaxation.  •  Estimated right ventricular systolic pressure from tricuspid regurgitation is mildly elevated (35-45 mmHg). Calculated right ventricular systolic pressure from tricuspid regurgitation is 39 mmHg.      Current medications:  Scheduled Meds:carvedilol, 12.5 mg, Oral, BID With  Meals  famotidine, 20 mg, Oral, BID AC  ferrous sulfate, 325 mg, Oral, Daily With Breakfast  insulin detemir, 10 Units, Subcutaneous, Daily  insulin lispro, 0-7 Units, Subcutaneous, TID AC  lactobacillus acidophilus, 1 capsule, Oral, Daily  losartan, 50 mg, Oral, Q24H  metoclopramide, 10 mg, Oral, 4x Daily AC & at Bedtime  mirtazapine, 7.5 mg, Oral, Nightly  rivaroxaban, 20 mg, Oral, Daily With Dinner  sodium chloride, 10 mL, Intravenous, Q12H  sodium chloride, 10 mL, Intravenous, Q12H  terazosin, 1 mg, Oral, Nightly      Continuous Infusions:sodium chloride, 250 mL/hr      PRN Meds:.•  acetaminophen  •  dextrose  •  dextrose  •  glucagon (human recombinant)  •  Magnesium Standard Dose Replacement - Follow Nurse / BPA Driven Protocol  •  melatonin  •  ondansetron **OR** ondansetron  •  Phosphorus Replacement - Follow Nurse / BPA Driven Protocol  •  Potassium Replacement - Follow Nurse / BPA Driven Protocol  •  [COMPLETED] Insert Peripheral IV **AND** sodium chloride  •  sodium chloride  •  sodium chloride  •  sodium chloride  •  sodium chloride  •  traMADol    Assessment & Plan   Assessment & Plan     Active Hospital Problems    Diagnosis  POA   • **Refractory nausea and vomiting [R11.2]  Yes   • HHS vs mild DKA [E11.00]  Yes   • CKD (chronic kidney disease) stage 3, GFR 30-59 ml/min [N18.30]  Yes   • Hypertensive urgency [I16.0]  Yes   • Atrial fibrillation and flutter [I48.91, I48.92]  Yes   • Gastroparesis [K31.84]  Yes   • Uncontrolled type 1 diabetes mellitus with hyperglycemia [E10.65]  Yes   • Hypertension [I10]  Yes   • Hyperlipidemia [E78.5]  Yes      Resolved Hospital Problems   No resolved problems to display.        Brief Hospital Course to date:  Thelma Michael is a 62 y.o. female w/ Afib on xarelto, iron def anemia, HTN, HLD, prior stroke, GERD, tobacco abuse, DM1 w/ gastroparesis, she is well known to this facility for recurrent admissions related to gastroparesis +/- DKA; she has not been  taking her mealtime insulin citing that it doesn't work, and presented w/ N/V and admitted for DKA    Assessment/Plan    Uncontrolled DM type 1, a1c 8.7%, s/p DKA  Diabetic gastroparesis w/ intractable N/V  -insulin pump still nonfunctional; requires basal-bolus, has not been taking bolus  -ct a/p w/o acute finding  -cont subQ insulin  -tolerating gastroparesis diet    Enterococcal UTI  -s/p single dose Fosfomycin    HTN urgency  -2/2 not being able to take her po meds  -cont coreg, terazosin  -bp uncontrolled this am, add losartan    CKD stage 3  -BLine Cr 1.0-1.3; eGFR 40-50's  -approx baseline    Afib  -cont coreg, xarelto    GERD  -cont pepcid w/ Hx cdiff and active Abx use    Iron def anemia, chronic      Expected Discharge Location and Transportation: home  Expected Discharge   Expected Discharge Date: 5/4/2023; Expected Discharge Time:      DVT prophylaxis:  Medical DVT prophylaxis orders are present.          CODE STATUS:   Code Status and Medical Interventions:   Ordered at: 04/30/23 1335     Code Status (Patient has no pulse and is not breathing):    CPR (Attempt to Resuscitate)     Medical Interventions (Patient has pulse or is breathing):    Full Support       Kavon Cramer, DO  05/03/23

## 2023-05-03 NOTE — CASE MANAGEMENT/SOCIAL WORK
Continued Stay Note  [facility]     Patient Name: Thelma Michael  MRN: [id number]  Today's Date: 5/3/2023    Admit Date: 4/30/2023    Plan: Home   Discharge Plan     Row Name 05/03/23 1103       Plan    Plan Home    Patient/Family in Agreement with Plan yes    Plan Comments Discussed patient in MDR; patient's plan remains home with son, Keshav.  Family to transport at discharge.  No needs identified.               Discharge Codes    No documentation.               Expected Discharge Date and Time     Expected Discharge Date Expected Discharge Time    May 3, 2023             Ashlee Zavala RN

## 2023-05-03 NOTE — PLAN OF CARE
Goal Outcome Evaluation:      Elevated blood pressure today. Physician adjusting medications. No complaints or concerns throughout shift.

## 2023-05-03 NOTE — CONSULTS
Reviewed chart for diabetes education consult.  Noted a1c of 8.7%. Noted taking insulin to treat diabetes.  Noted was using insulin pump, but she is not using it now.  Noted she was seen recently 3/2023 by diabetes education inpatient.  Spoke with MsHiginio Alec this afternoon.  She states that she has not been able to use her pump for about the last month.  She states that she was trained on the [employer] pump at her endocrinology office by, Salma.  She states that she sees Dr. Gerson Ochoa for endocrinology and her next appointment is not until July.  She states that in the meantime she has been taking Levemir 10 units q am and Humalog by sliding scale with each meal, 3 times daily.  Reviewed her a1c with her, and she states it is higher than usual.  She states she knows she needs to get her pump working again.  We devised a plan A to try to contact her pump , Salma.  She states she has her contact information.  Plan B is to try to contact the customer service number at [employer] company to try to trouble shoot her issue.  Encouraged her to contact Dr. Ochoa to discuss her interim insulin plan either by phone or by using My Chart.  She states she forgot that she could contact him through My Chart.  She states she has a Freestyle meter that she uses to check blood sugar before each insulin shot/ meal.  She states that she has not contacted him because she has been feeling so badly.  She was given our What is Diabetes handout.  Reviewed basic physiology of type 1 diabetes briefly.  Reviewed blood sugar goals of a1c less than 7% and before meal blood sugar  and 2 hour after meals .  Reviewed low blood sugar less than 70 and how to treat it at home.  She states she usually carries glucose tabs.  Reviewed watching for high blood sugar as well.  Reiterated that she has to take insulin, and that her body needs it to live.  She was given our contact information and encouraged her to call us if She is not able  to reach her educator or her provider to get pump working again.  Thank you for this referral.

## 2023-05-04 LAB
ANION GAP SERPL CALCULATED.3IONS-SCNC: 11 MMOL/L (ref 5–15)
BUN SERPL-MCNC: 10 MG/DL (ref 8–23)
BUN/CREAT SERPL: 10.3 (ref 7–25)
CALCIUM SPEC-SCNC: 8.9 MG/DL (ref 8.6–10.5)
CHLORIDE SERPL-SCNC: 108 MMOL/L (ref 98–107)
CO2 SERPL-SCNC: 21 MMOL/L (ref 22–29)
CREAT SERPL-MCNC: 0.97 MG/DL (ref 0.57–1)
EGFRCR SERPLBLD CKD-EPI 2021: 65.4 ML/MIN/1.73
GLUCOSE BLDC GLUCOMTR-MCNC: 214 MG/DL (ref 70–130)
GLUCOSE BLDC GLUCOMTR-MCNC: 217 MG/DL (ref 70–130)
GLUCOSE BLDC GLUCOMTR-MCNC: 219 MG/DL (ref 70–130)
GLUCOSE BLDC GLUCOMTR-MCNC: 297 MG/DL (ref 70–130)
GLUCOSE BLDC GLUCOMTR-MCNC: >599 MG/DL (ref 70–130)
GLUCOSE SERPL-MCNC: 175 MG/DL (ref 65–99)
POTASSIUM SERPL-SCNC: 4.2 MMOL/L (ref 3.5–5.2)
SODIUM SERPL-SCNC: 140 MMOL/L (ref 136–145)

## 2023-05-04 PROCEDURE — 63710000001 INSULIN LISPRO (HUMAN) PER 5 UNITS: Performed by: INTERNAL MEDICINE

## 2023-05-04 PROCEDURE — 82948 REAGENT STRIP/BLOOD GLUCOSE: CPT

## 2023-05-04 PROCEDURE — G0378 HOSPITAL OBSERVATION PER HR: HCPCS

## 2023-05-04 PROCEDURE — 63710000001 INSULIN DETEMIR PER 5 UNITS: Performed by: INTERNAL MEDICINE

## 2023-05-04 PROCEDURE — 80048 BASIC METABOLIC PNL TOTAL CA: CPT | Performed by: INTERNAL MEDICINE

## 2023-05-04 PROCEDURE — 99232 SBSQ HOSP IP/OBS MODERATE 35: CPT | Performed by: NURSE PRACTITIONER

## 2023-05-04 RX ORDER — AMLODIPINE BESYLATE 10 MG/1
10 TABLET ORAL
Status: DISCONTINUED | OUTPATIENT
Start: 2023-05-04 | End: 2023-05-05 | Stop reason: HOSPADM

## 2023-05-04 RX ORDER — AMLODIPINE BESYLATE 5 MG/1
5 TABLET ORAL
Status: DISCONTINUED | OUTPATIENT
Start: 2023-05-04 | End: 2023-05-04

## 2023-05-04 RX ORDER — LOSARTAN POTASSIUM 50 MG/1
100 TABLET ORAL
Status: DISCONTINUED | OUTPATIENT
Start: 2023-05-04 | End: 2023-05-05 | Stop reason: HOSPADM

## 2023-05-04 RX ADMIN — LOSARTAN POTASSIUM 100 MG: 50 TABLET, FILM COATED ORAL at 08:23

## 2023-05-04 RX ADMIN — Medication 5 MG: at 20:21

## 2023-05-04 RX ADMIN — METOCLOPRAMIDE 10 MG: 10 TABLET ORAL at 17:29

## 2023-05-04 RX ADMIN — TRAMADOL HYDROCHLORIDE 50 MG: 50 TABLET, COATED ORAL at 20:21

## 2023-05-04 RX ADMIN — INSULIN LISPRO 2 UNITS: 100 INJECTION, SOLUTION INTRAVENOUS; SUBCUTANEOUS at 08:24

## 2023-05-04 RX ADMIN — CARVEDILOL 12.5 MG: 12.5 TABLET, FILM COATED ORAL at 17:29

## 2023-05-04 RX ADMIN — MIRTAZAPINE 7.5 MG: 15 TABLET, FILM COATED ORAL at 20:21

## 2023-05-04 RX ADMIN — FERROUS SULFATE TAB 325 MG (65 MG ELEMENTAL FE) 325 MG: 325 (65 FE) TAB at 08:23

## 2023-05-04 RX ADMIN — AMLODIPINE BESYLATE 10 MG: 10 TABLET ORAL at 08:23

## 2023-05-04 RX ADMIN — INSULIN LISPRO 2 UNITS: 100 INJECTION, SOLUTION INTRAVENOUS; SUBCUTANEOUS at 17:29

## 2023-05-04 RX ADMIN — Medication 1 CAPSULE: at 08:23

## 2023-05-04 RX ADMIN — LOSARTAN POTASSIUM 50 MG: 50 TABLET, FILM COATED ORAL at 06:35

## 2023-05-04 RX ADMIN — TRAMADOL HYDROCHLORIDE 50 MG: 50 TABLET, COATED ORAL at 10:50

## 2023-05-04 RX ADMIN — INSULIN LISPRO 2 UNITS: 100 INJECTION, SOLUTION INTRAVENOUS; SUBCUTANEOUS at 11:55

## 2023-05-04 RX ADMIN — INSULIN DETEMIR 10 UNITS: 100 INJECTION, SOLUTION SUBCUTANEOUS at 08:24

## 2023-05-04 RX ADMIN — METOCLOPRAMIDE 10 MG: 10 TABLET ORAL at 20:21

## 2023-05-04 RX ADMIN — FAMOTIDINE 20 MG: 20 TABLET, FILM COATED ORAL at 17:29

## 2023-05-04 RX ADMIN — CARVEDILOL 12.5 MG: 12.5 TABLET, FILM COATED ORAL at 05:20

## 2023-05-04 RX ADMIN — TERAZOSIN HYDROCHLORIDE 1 MG: 1 CAPSULE ORAL at 20:21

## 2023-05-04 RX ADMIN — FAMOTIDINE 20 MG: 20 TABLET, FILM COATED ORAL at 06:35

## 2023-05-04 RX ADMIN — METOCLOPRAMIDE 10 MG: 10 TABLET ORAL at 10:47

## 2023-05-04 RX ADMIN — METOCLOPRAMIDE 10 MG: 10 TABLET ORAL at 06:35

## 2023-05-04 RX ADMIN — RIVAROXABAN 20 MG: 20 TABLET, FILM COATED ORAL at 17:29

## 2023-05-04 NOTE — PLAN OF CARE
Goal Outcome Evaluation:      Patient alert and oriented. No complaints voiced. Able to bathe self at sink without assistance. Waiting on renal US in the morning before d/c. Vital signs stable.       Problem: Adult Inpatient Plan of Care  Goal: Plan of Care Review  Outcome: Ongoing, Not Progressing     Problem: Adult Inpatient Plan of Care  Goal: Patient-Specific Goal (Individualized)  Outcome: Ongoing, Not Progressing     Problem: Adult Inpatient Plan of Care  Goal: Absence of Hospital-Acquired Illness or Injury  Intervention: Identify and Manage Fall Risk  Recent Flowsheet Documentation  Taken 5/4/2023 1800 by Celia Tapia RN  Safety Promotion/Fall Prevention:   activity supervised   assistive device/personal items within reach   clutter free environment maintained   fall prevention program maintained   nonskid shoes/slippers when out of bed   room organization consistent   safety round/check completed  Taken 5/4/2023 1600 by Celia Tapia RN  Safety Promotion/Fall Prevention:   activity supervised   assistive device/personal items within reach   clutter free environment maintained   fall prevention program maintained   nonskid shoes/slippers when out of bed   room organization consistent   safety round/check completed  Taken 5/4/2023 1400 by Celia Tapia RN  Safety Promotion/Fall Prevention:   activity supervised   assistive device/personal items within reach   clutter free environment maintained   fall prevention program maintained   nonskid shoes/slippers when out of bed   safety round/check completed   room organization consistent  Taken 5/4/2023 1200 by Celia Tapia RN  Safety Promotion/Fall Prevention:   activity supervised   assistive device/personal items within reach   clutter free environment maintained   fall prevention program maintained   nonskid shoes/slippers when out of bed   room organization consistent   safety round/check completed  Taken 5/4/2023 1000 by Celia Tapia RN  Safety  Promotion/Fall Prevention:   assistive device/personal items within reach   activity supervised   clutter free environment maintained   fall prevention program maintained   nonskid shoes/slippers when out of bed   safety round/check completed   room organization consistent  Taken 5/4/2023 0823 by Celia Tapia, RN  Safety Promotion/Fall Prevention:   activity supervised   assistive device/personal items within reach   clutter free environment maintained   fall prevention program maintained   nonskid shoes/slippers when out of bed   room organization consistent   safety round/check completed

## 2023-05-04 NOTE — PROGRESS NOTES
[facility] Medicine Services  PROGRESS NOTE    Patient Name: Thelma Michael  : 1960  MRN: [id number]    Date of Admission: 2023  Primary Care Physician: Monet Howe APRN    Subjective   Subjective     CC:  F/u htn    HPI:  Blood pressure still elevated today. Pt states she has never had blood pressure issues like this. Increased amlodipine to 10 mg daily and obtain a renal duplex.     ROS:  Gen- No fevers, chills  CV- No chest pain, palpitations  Resp- No cough, dyspnea  GI- No N/V/D, abd pain      Objective   Objective     Vital Signs:   Temp:  [98.2 °F (36.8 °C)-98.8 °F (37.1 °C)] 98.5 °F (36.9 °C)  Heart Rate:  [71-84] 76  Resp:  [16-18] 18  BP: (141-215)/(78-98) 169/93     Physical Exam:  Constitutional: Awake, alert, NAD  HENT: NCAT, mucous membranes moist  Respiratory: Clear to auscultation bilaterally, nonlabored respirations   Cardiovascular: RRR, no murmurs, rubs, or gallops  Gastrointestinal: Positive bowel sounds, soft, nontender, nondistended  Musculoskeletal: No bilateral ankle edema  Psychiatric: Appropriate affect, cooperative  Neurologic: non focal, speech clear  Skin: No rashes      Results Reviewed:  LAB RESULTS:      Lab 23  1109 23  1103   WBC  --  7.80   HEMOGLOBIN  --  10.9*   HEMOGLOBIN, POC 11.2*  --    HEMATOCRIT  --  32.9*   HEMATOCRIT POC 33*  --    PLATELETS  --  501*   NEUTROS ABS  --  6.73   IMMATURE GRANS (ABS)  --  0.03   LYMPHS ABS  --  0.77   MONOS ABS  --  0.24   EOS ABS  --  0.01   MCV  --  88.7   PROTIME  --  14.9*         Lab 23  0537 23  2255 23  0517 23  0036 23  0942 23  0423 23  2353 23  2014 23  1109 23  1103   SODIUM 140  --  143  --  144 142 149* 143   < > 137   POTASSIUM 4.2 4.1 3.6  --  4.8 5.1 5.1 3.9   < > 3.5   CHLORIDE 108*  --  112*  --  115* 114* 116* 111*   < > 96*   CO2 21.0*  --  20.0*  --  16.0* 19.0* 21.0* 19.0*   < > 17.0*    ANION GAP 11.0  --  11.0  --  13.0 9.0 12.0 13.0   < > 24.0*   BUN 10  --  9  --  17 18 21 20   < > 21   CREATININE 0.97  --  0.95  --  1.09* 1.24* 1.10* 1.19*   < > 1.22*   EGFR 65.4  --  67.0  --  56.8* 48.7* 56.2* 51.2*   < > 49.7*   GLUCOSE 175*  --  94  --  221* 141* 108* 147*   < > 616*   CALCIUM 8.9  --  8.1*  --  8.7 8.8 8.8 9.0   < > 10.0   MAGNESIUM  --   --  2.6*  --  1.4* 2.7* 1.6 1.7   < > 2.7*   PHOSPHORUS  --   --   --  4.4 3.1 0.9* 1.9* 1.2*   < > 2.3*   HEMOGLOBIN A1C  --   --   --   --   --   --   --   --   --  8.70*    < > = values in this interval not displayed.         Lab 04/30/23  1103   TOTAL PROTEIN 7.7   ALBUMIN 4.1   GLOBULIN 3.6   ALT (SGPT) 9   AST (SGOT) 7   BILIRUBIN 0.4   ALK PHOS 132*   LIPASE 88*         Lab 04/30/23  1103   HSTROP T 23*   PROTIME 14.9*   INR 1.15*                 Lab 05/01/23  0143 04/30/23  1145   PH, ARTERIAL 7.419  --    PCO2, ARTERIAL 33.3*  --    PO2 ART 89.3  --    FIO2 21 21   HCO3 ART 21.6  --    BASE EXCESS ART -2.5*  --    CARBOXYHEMOGLOBIN 1.3  --    CARBOXYHEMOGLOBIN (VENOUS)  --  1.4     Brief Urine Lab Results  (Last result in the past 365 days)      Color   Clarity   Blood   Leuk Est   Nitrite   Protein   CREAT   Urine HCG        04/30/23 1513 Yellow   Cloudy   Trace   Small (1+)   Negative   100 mg/dL (2+)                 Microbiology Results Abnormal     None          No radiology results from the last 24 hrs    Results for orders placed during the hospital encounter of 12/06/22    Adult Transthoracic Echo Complete W/ Cont if Necessary Per Protocol    Interpretation Summary  •  Left ventricular ejection fraction appears to be greater than 70%.  •  Left ventricular wall thickness is consistent with moderate concentric hypertrophy.  •  Left ventricular diastolic function is consistent with (grade I) impaired relaxation.  •  Estimated right ventricular systolic pressure from tricuspid regurgitation is mildly elevated (35-45 mmHg). Calculated right  ventricular systolic pressure from tricuspid regurgitation is 39 mmHg.      Current medications:  Scheduled Meds:amLODIPine, 10 mg, Oral, Q24H  carvedilol, 12.5 mg, Oral, BID With Meals  famotidine, 20 mg, Oral, BID AC  ferrous sulfate, 325 mg, Oral, Daily With Breakfast  insulin detemir, 10 Units, Subcutaneous, Daily  insulin lispro, 0-7 Units, Subcutaneous, TID AC  lactobacillus acidophilus, 1 capsule, Oral, Daily  losartan, 100 mg, Oral, Q24H  metoclopramide, 10 mg, Oral, 4x Daily AC & at Bedtime  mirtazapine, 7.5 mg, Oral, Nightly  rivaroxaban, 20 mg, Oral, Daily With Dinner  sodium chloride, 10 mL, Intravenous, Q12H  sodium chloride, 10 mL, Intravenous, Q12H  terazosin, 1 mg, Oral, Nightly      Continuous Infusions:sodium chloride, 250 mL/hr      PRN Meds:.•  acetaminophen  •  dextrose  •  dextrose  •  glucagon (human recombinant)  •  Magnesium Standard Dose Replacement - Follow Nurse / BPA Driven Protocol  •  melatonin  •  ondansetron **OR** ondansetron  •  Phosphorus Replacement - Follow Nurse / BPA Driven Protocol  •  Potassium Replacement - Follow Nurse / BPA Driven Protocol  •  [COMPLETED] Insert Peripheral IV **AND** sodium chloride  •  sodium chloride  •  sodium chloride  •  sodium chloride  •  sodium chloride  •  traMADol    Assessment & Plan   Assessment & Plan     Active Hospital Problems    Diagnosis  POA   • **Refractory nausea and vomiting [R11.2]  Yes   • HHS vs mild DKA [E11.00]  Yes   • CKD (chronic kidney disease) stage 3, GFR 30-59 ml/min [N18.30]  Yes   • Hypertensive urgency [I16.0]  Yes   • Atrial fibrillation and flutter [I48.91, I48.92]  Yes   • Gastroparesis [K31.84]  Yes   • Uncontrolled type 1 diabetes mellitus with hyperglycemia [E10.65]  Yes   • Hypertension [I10]  Yes   • Hyperlipidemia [E78.5]  Yes      Resolved Hospital Problems   No resolved problems to display.        Brief Hospital Course to date:  Thelma Michael is a 62 y.o. female w/ Afib on xarelto, iron def anemia,  HTN, HLD, prior stroke, GERD, tobacco abuse, DM1 w/ gastroparesis, she is well known to this facility for recurrent admissions related to gastroparesis +/- DKA; she has not been taking her mealtime insulin citing that it doesn't work, and presented w/ N/V and admitted for DKA. Glucose improved.Pt found with enterococcal UTI treated with fosfomycin.     Assessment/Plan    Uncontrolled DM type 1, a1c 8.7%, s/p DKA  Diabetic gastroparesis w/ intractable N/V  -insulin pump still nonfunctional; requires basal-bolus, has not been taking bolus  -ct a/p w/o acute finding  -cont subQ insulin  -tolerating gastroparesis diet    Enterococcal UTI  -s/p single dose Fosfomycin    HTN urgency  -2/2 not being able to take her po meds  -cont coreg, terazosin  -bp uncontrolled this am, add losartan    CKD stage 3  -BLine Cr 1.0-1.3; eGFR 40-50's  -approx baseline    Afib  -cont coreg, xarelto    GERD  -cont pepcid w/ Hx cdiff and active Abx use    Iron def anemia, chronic      Expected Discharge Location and Transportation: home  Expected Discharge   05/05/23 if BP is controlled.     DVT prophylaxis:  Medical DVT prophylaxis orders are present.          CODE STATUS:   Code Status and Medical Interventions:   Ordered at: 04/30/23 1335     Code Status (Patient has no pulse and is not breathing):    CPR (Attempt to Resuscitate)     Medical Interventions (Patient has pulse or is breathing):    Full Support       Laila García, HOWIE  05/04/23       None

## 2023-05-05 ENCOUNTER — APPOINTMENT (OUTPATIENT)
Dept: CARDIOLOGY | Facility: HOSPITAL | Age: 63
End: 2023-05-05
Payer: COMMERCIAL

## 2023-05-05 ENCOUNTER — READMISSION MANAGEMENT (OUTPATIENT)
Dept: CALL CENTER | Facility: HOSPITAL | Age: 63
End: 2023-05-05
Payer: COMMERCIAL

## 2023-05-05 VITALS
BODY MASS INDEX: 21.19 KG/M2 | HEART RATE: 76 BPM | RESPIRATION RATE: 16 BRPM | OXYGEN SATURATION: 97 % | DIASTOLIC BLOOD PRESSURE: 73 MMHG | WEIGHT: 135 LBS | HEIGHT: 67 IN | TEMPERATURE: 98.3 F | SYSTOLIC BLOOD PRESSURE: 172 MMHG

## 2023-05-05 LAB
ANION GAP SERPL CALCULATED.3IONS-SCNC: 13 MMOL/L (ref 5–15)
BASOPHILS # BLD AUTO: 0.04 10*3/MM3 (ref 0–0.2)
BASOPHILS NFR BLD AUTO: 0.8 % (ref 0–1.5)
BH CV ECHO MEAS - DIST REN A EDV LEFT: 27.3 CM/S
BH CV ECHO MEAS - DIST REN A PSV LEFT: 115 CM/S
BH CV ECHO MEAS - MID REN A EDV LEFT: -24.6 CM/S
BH CV ECHO MEAS - MID REN A PSV LEFT: -97.7 CM/S
BH CV ECHO MEAS - PROX REN A EDV LEFT: -18.4 CM/S
BH CV ECHO MEAS - PROX REN A PSV LEFT: -89.1 CM/S
BH CV VAS KIDNEY HEIGHT LEFT: 3.6 CM
BH CV VAS RENAL AORTIC MID EDV: 18.9 CM/S
BH CV VAS RENAL AORTIC MID PSV: 118 CM/S
BH CV VAS RENAL HILUM LEFT EDV: 26.5 CM/S
BH CV VAS RENAL HILUM LEFT PSV: 102 CM/S
BH CV VAS RENAL HILUM RIGHT EDV: 30.7 CM/S
BH CV VAS RENAL HILUM RIGHT PSV: 108 CM/S
BH CV XLRA MEAS - KID L LEFT: 10.1 CM
BH CV XLRA MEAS DIST REN A EDV RIGHT: 28.5 CM/S
BH CV XLRA MEAS DIST REN A PSV RIGHT: 133 CM/S
BH CV XLRA MEAS KID H RIGHT: 3.6 CM
BH CV XLRA MEAS KID L RIGHT: 11.4 CM
BH CV XLRA MEAS KID W RIGHT: 4.2 CM
BH CV XLRA MEAS MID REN A EDV RIGHT: 22.4 CM/S
BH CV XLRA MEAS MID REN A PSV RIGHT: 83.5 CM/S
BH CV XLRA MEAS PROX REN A EDV RIGHT: -48.6 CM/S
BH CV XLRA MEAS PROX REN A PSV RIGHT: -231 CM/S
BH CV XLRA MEAS RAR LEFT: 1.4
BH CV XLRA MEAS RAR RIGHT: 2
BH CV XLRA MEAS RENAL A ORG EDV LEFT: -33.2 CM/S
BH CV XLRA MEAS RENAL A ORG EDV RIGHT: 34.4 CM/S
BH CV XLRA MEAS RENAL A ORG PSV LEFT: -160 CM/S
BH CV XLRA MEAS RENAL A ORG PSV RIGHT: 181 CM/S
BUN SERPL-MCNC: 11 MG/DL (ref 8–23)
BUN/CREAT SERPL: 10.7 (ref 7–25)
CALCIUM SPEC-SCNC: 8.8 MG/DL (ref 8.6–10.5)
CHLORIDE SERPL-SCNC: 106 MMOL/L (ref 98–107)
CO2 SERPL-SCNC: 20 MMOL/L (ref 22–29)
CREAT SERPL-MCNC: 1.03 MG/DL (ref 0.57–1)
DEPRECATED RDW RBC AUTO: 47.5 FL (ref 37–54)
EGFRCR SERPLBLD CKD-EPI 2021: 60.8 ML/MIN/1.73
EOSINOPHIL # BLD AUTO: 0.09 10*3/MM3 (ref 0–0.4)
EOSINOPHIL NFR BLD AUTO: 1.8 % (ref 0.3–6.2)
ERYTHROCYTE [DISTWIDTH] IN BLOOD BY AUTOMATED COUNT: 13.6 % (ref 12.3–15.4)
GLUCOSE BLDC GLUCOMTR-MCNC: 125 MG/DL (ref 70–130)
GLUCOSE BLDC GLUCOMTR-MCNC: 219 MG/DL (ref 70–130)
GLUCOSE SERPL-MCNC: 196 MG/DL (ref 65–99)
HCT VFR BLD AUTO: 33.7 % (ref 34–46.6)
HGB BLD-MCNC: 10.6 G/DL (ref 12–15.9)
IMM GRANULOCYTES # BLD AUTO: 0.04 10*3/MM3 (ref 0–0.05)
IMM GRANULOCYTES NFR BLD AUTO: 0.8 % (ref 0–0.5)
LEFT KIDNEY WIDTH: 5 CM
LEFT RENAL UPPER PARENCHYMA MAX: 45.6 CM/S
LEFT RENAL UPPER PARENCHYMA MIN: 14.8 CM/S
LEFT RENAL UPPER PARENCHYMA RI: 0.68
LYMPHOCYTES # BLD AUTO: 1.88 10*3/MM3 (ref 0.7–3.1)
LYMPHOCYTES NFR BLD AUTO: 36.8 % (ref 19.6–45.3)
MCH RBC QN AUTO: 30.3 PG (ref 26.6–33)
MCHC RBC AUTO-ENTMCNC: 31.5 G/DL (ref 31.5–35.7)
MCV RBC AUTO: 96.3 FL (ref 79–97)
MONOCYTES # BLD AUTO: 0.56 10*3/MM3 (ref 0.1–0.9)
MONOCYTES NFR BLD AUTO: 11 % (ref 5–12)
NEUTROPHILS NFR BLD AUTO: 2.5 10*3/MM3 (ref 1.7–7)
NEUTROPHILS NFR BLD AUTO: 48.8 % (ref 42.7–76)
NRBC BLD AUTO-RTO: 0 /100 WBC (ref 0–0.2)
PLATELET # BLD AUTO: 358 10*3/MM3 (ref 140–450)
PMV BLD AUTO: 10.2 FL (ref 6–12)
POTASSIUM SERPL-SCNC: 4 MMOL/L (ref 3.5–5.2)
RBC # BLD AUTO: 3.5 10*6/MM3 (ref 3.77–5.28)
RIGHT RENAL UPPER PARENCHYMA MAX: 55.2 CM/S
RIGHT RENAL UPPER PARENCHYMA MIN: 11.5 CM/S
RIGHT RENAL UPPER PARENCHYMA RI: 0.79
SODIUM SERPL-SCNC: 139 MMOL/L (ref 136–145)
WBC NRBC COR # BLD: 5.11 10*3/MM3 (ref 3.4–10.8)

## 2023-05-05 PROCEDURE — 99239 HOSP IP/OBS DSCHRG MGMT >30: CPT | Performed by: NURSE PRACTITIONER

## 2023-05-05 PROCEDURE — 63710000001 INSULIN DETEMIR PER 5 UNITS: Performed by: INTERNAL MEDICINE

## 2023-05-05 PROCEDURE — 85025 COMPLETE CBC W/AUTO DIFF WBC: CPT | Performed by: NURSE PRACTITIONER

## 2023-05-05 PROCEDURE — 63710000001 INSULIN LISPRO (HUMAN) PER 5 UNITS: Performed by: INTERNAL MEDICINE

## 2023-05-05 PROCEDURE — 93975 VASCULAR STUDY: CPT

## 2023-05-05 PROCEDURE — 80048 BASIC METABOLIC PNL TOTAL CA: CPT | Performed by: NURSE PRACTITIONER

## 2023-05-05 PROCEDURE — 82948 REAGENT STRIP/BLOOD GLUCOSE: CPT

## 2023-05-05 PROCEDURE — G0378 HOSPITAL OBSERVATION PER HR: HCPCS

## 2023-05-05 RX ORDER — METOCLOPRAMIDE 10 MG/1
10 TABLET ORAL
Qty: 90 TABLET | Refills: 0 | Status: SHIPPED | OUTPATIENT
Start: 2023-05-05 | End: 2023-06-04

## 2023-05-05 RX ORDER — LOSARTAN POTASSIUM 100 MG/1
100 TABLET ORAL
Qty: 30 TABLET | Refills: 0 | Status: SHIPPED | OUTPATIENT
Start: 2023-05-05

## 2023-05-05 RX ORDER — ONDANSETRON 4 MG/1
4 TABLET, FILM COATED ORAL EVERY 4 HOURS PRN
Qty: 60 TABLET | Refills: 0 | Status: SHIPPED | OUTPATIENT
Start: 2023-05-05 | End: 2023-06-04

## 2023-05-05 RX ORDER — AMLODIPINE BESYLATE 10 MG/1
10 TABLET ORAL
Qty: 30 TABLET | Refills: 0 | Status: SHIPPED | OUTPATIENT
Start: 2023-05-05

## 2023-05-05 RX ORDER — CARVEDILOL 12.5 MG/1
12.5 TABLET ORAL 2 TIMES DAILY WITH MEALS
Qty: 60 TABLET | Refills: 0 | Status: SHIPPED | OUTPATIENT
Start: 2023-05-05

## 2023-05-05 RX ORDER — L.ACID,PARA/B.BIFIDUM/S.THERM 8B CELL
1 CAPSULE ORAL DAILY
Qty: 30 CAPSULE | Refills: 0 | Status: SHIPPED | OUTPATIENT
Start: 2023-05-05

## 2023-05-05 RX ADMIN — LOSARTAN POTASSIUM 100 MG: 50 TABLET, FILM COATED ORAL at 10:51

## 2023-05-05 RX ADMIN — Medication 1 CAPSULE: at 10:51

## 2023-05-05 RX ADMIN — FAMOTIDINE 20 MG: 20 TABLET, FILM COATED ORAL at 07:44

## 2023-05-05 RX ADMIN — Medication 10 ML: at 10:51

## 2023-05-05 RX ADMIN — METOCLOPRAMIDE 10 MG: 10 TABLET ORAL at 07:44

## 2023-05-05 RX ADMIN — METOCLOPRAMIDE 10 MG: 10 TABLET ORAL at 10:51

## 2023-05-05 RX ADMIN — INSULIN LISPRO 2 UNITS: 100 INJECTION, SOLUTION INTRAVENOUS; SUBCUTANEOUS at 07:44

## 2023-05-05 RX ADMIN — FERROUS SULFATE TAB 325 MG (65 MG ELEMENTAL FE) 325 MG: 325 (65 FE) TAB at 07:44

## 2023-05-05 RX ADMIN — AMLODIPINE BESYLATE 10 MG: 10 TABLET ORAL at 10:51

## 2023-05-05 RX ADMIN — Medication 10 ML: at 10:52

## 2023-05-05 RX ADMIN — CARVEDILOL 12.5 MG: 12.5 TABLET, FILM COATED ORAL at 07:44

## 2023-05-05 RX ADMIN — INSULIN DETEMIR 10 UNITS: 100 INJECTION, SOLUTION SUBCUTANEOUS at 10:51

## 2023-05-05 NOTE — CASE MANAGEMENT/SOCIAL WORK
Case Management Discharge Note      Final Note: Patient will be discharged home today, daughter will transport.  Patient reports no needs.         Selected Continued Care - Admitted Since 4/30/2023     Destination    No services have been selected for the patient.              Durable Medical Equipment    No services have been selected for the patient.              Dialysis/Infusion    No services have been selected for the patient.              Home Medical Care    No services have been selected for the patient.              Therapy    No services have been selected for the patient.              Community Resources    No services have been selected for the patient.              Community & DME    No services have been selected for the patient.                Selected Continued Care - Prior Encounters Includes continued care and service providers with selected services from prior encounters from 1/30/2023 to 5/5/2023    Discharged on 2/6/2023 Admission date: 2/3/2023 - Discharge disposition: Home-Health Care [facility] [place]    Home Medical Care     Service Provider Selected Services Address Phone Fax Patient Preferred    [facility] [address] [facility] [address] [phone] --                         Final Discharge Disposition Code: 01 - home or self-care

## 2023-05-05 NOTE — DISCHARGE SUMMARY
[facility] Medicine Services  DISCHARGE SUMMARY    Patient Name: Thelma Michael  : 1960  MRN: [id number]    Date of Admission: 2023 10:30 AM  Date of Discharge:  23    Primary Care Physician: Monet Howe APRN    Consults     No orders found from 2023 to 2023.          Hospital Course     Presenting Problem:   Refractory nausea and vomiting [R11.2]  Hyperosmolar hyperglycemic state (HHS) [E11.00]    Active Hospital Problems    Diagnosis  POA   • **Refractory nausea and vomiting [R11.2]  Yes   • HHS vs mild DKA [E11.00]  Yes   • CKD (chronic kidney disease) stage 3, GFR 30-59 ml/min [N18.30]  Yes   • Hypertensive urgency [I16.0]  Yes   • Atrial fibrillation and flutter [I48.91, I48.92]  Yes   • Gastroparesis [K31.84]  Yes   • Uncontrolled type 1 diabetes mellitus with hyperglycemia [E10.65]  Yes   • Hypertension [I10]  Yes   • Hyperlipidemia [E78.5]  Yes      Resolved Hospital Problems   No resolved problems to display.          Hospital Course:  Thelma Michael is a 62 y.o. female w/ Afib on xarelto, iron def anemia, HTN, HLD, prior stroke, GERD, tobacco abuse, DM1 w/ gastroparesis, she is well known to this facility for recurrent admissions related to gastroparesis +/- DKA; she has not been taking her mealtime insulin citing that it doesn't work, and presented w/ N/V and admitted for DKA. Glucose improved on Insulin regimen. Pt found with enterococcal UTI treated with fosfomycin.  She developed hypertensive urgency and antihypertensive regimen was adjusted. She is now tolerating diet and BP control has improved. She will be discharged today in stable condition.       Assessment/Plan:     Uncontrolled DM type 1, a1c 8.7%, s/p DKA  Diabetic gastroparesis w/ intractable N/V  -insulin pump still nonfunctional; requires basal-bolus  -ct a/p w/o acute finding  -cont subQ insulin  -tolerating gastroparesis diet     Enterococcal UTI  -s/p single  dose Fosfomycin     HTN urgency  -2/2 not being able to take her po meds  -Increased coreg to 12.5 twice daily, continue Cardura  -started on amlodipine 10 mg daily, losartan 100 mg daily terazosin  -Renal duplex was unremarkable.     CKD stage 3  -BLine Cr 1.0-1.3; eGFR 40-50's  -approx baseline     Afib  -cont coreg, xarelto     GERD  -cont pepcid w/ Hx cdiff and active Abx use     Iron def anemia, chronic    Discharge Follow Up Recommendations for outpatient labs/diagnostics:   Follow up with PCP in 1 week.     Day of Discharge     HPI:   Patient seen resting in bed no apparent distress.  No acute vents overnight per nursing.  She reports that she is tolerating diet and would like to be discharged home today.  We discussed the importance of taking all medications as prescribed and keeping all recommended follow-up appointments.  She expressed no additional concerns this time.    Review of Systems  Gen- No fevers, chills  CV- No chest pain, palpitations  Resp- No cough, dyspnea  GI- No N/V/D, abd pain    Vital Signs:   Temp:  [98.3 °F (36.8 °C)-98.7 °F (37.1 °C)] 98.3 °F (36.8 °C)  Heart Rate:  [69-86] 76  Resp:  [16-18] 16  BP: (146-179)/(73-93) 172/73      Physical Exam:  Constitutional: No acute distress, awake, alert, frail, chronically ill-appearing  HENT: NCAT, mucous membranes moist  Respiratory: Clear to auscultation bilaterally, respiratory effort normal   Cardiovascular: RRR, no murmurs, palpable pedal pulses bilaterally, cap refill brisk   Gastrointestinal: Positive bowel sounds, soft, nontender, nondistended  Musculoskeletal: No BLE edema   Psychiatric: Appropriate affect, cooperative  Neurologic: Oriented x 3, moves all extremities, speech clear  Skin: warm, dry, no visible rash     Pertinent  and/or Most Recent Results     LAB RESULTS:      Lab 05/05/23  0610 04/30/23  1109 04/30/23  1103   WBC 5.11  --  7.80   HEMOGLOBIN 10.6*  --  10.9*   HEMOGLOBIN, POC  --  11.2*  --    HEMATOCRIT 33.7*  --   32.9*   HEMATOCRIT POC  --  33*  --    PLATELETS 358  --  501*   NEUTROS ABS 2.50  --  6.73   IMMATURE GRANS (ABS) 0.04  --  0.03   LYMPHS ABS 1.88  --  0.77   MONOS ABS 0.56  --  0.24   EOS ABS 0.09  --  0.01   MCV 96.3  --  88.7   PROTIME  --   --  14.9*         Lab 05/05/23  0610 05/04/23  0537 05/02/23  2255 05/02/23  0517 05/02/23  0036 05/01/23  0942 05/01/23  0423 04/30/23  2353 04/30/23 2014 04/30/23  1109 04/30/23  1103   SODIUM 139 140  --  143  --  144 142 149* 143   < > 137   POTASSIUM 4.0 4.2 4.1 3.6  --  4.8 5.1 5.1 3.9   < > 3.5   CHLORIDE 106 108*  --  112*  --  115* 114* 116* 111*   < > 96*   CO2 20.0* 21.0*  --  20.0*  --  16.0* 19.0* 21.0* 19.0*   < > 17.0*   ANION GAP 13.0 11.0  --  11.0  --  13.0 9.0 12.0 13.0   < > 24.0*   BUN 11 10  --  9  --  17 18 21 20   < > 21   CREATININE 1.03* 0.97  --  0.95  --  1.09* 1.24* 1.10* 1.19*   < > 1.22*   EGFR 60.8 65.4  --  67.0  --  56.8* 48.7* 56.2* 51.2*   < > 49.7*   GLUCOSE 196* 175*  --  94  --  221* 141* 108* 147*   < > 616*   CALCIUM 8.8 8.9  --  8.1*  --  8.7 8.8 8.8 9.0   < > 10.0   MAGNESIUM  --   --   --  2.6*  --  1.4* 2.7* 1.6 1.7   < > 2.7*   PHOSPHORUS  --   --   --   --  4.4 3.1 0.9* 1.9* 1.2*   < > 2.3*   HEMOGLOBIN A1C  --   --   --   --   --   --   --   --   --   --  8.70*    < > = values in this interval not displayed.         Lab 04/30/23  1103   TOTAL PROTEIN 7.7   ALBUMIN 4.1   GLOBULIN 3.6   ALT (SGPT) 9   AST (SGOT) 7   BILIRUBIN 0.4   ALK PHOS 132*   LIPASE 88*         Lab 04/30/23  1103   HSTROP T 23*   PROTIME 14.9*   INR 1.15*                 Lab 05/01/23  0143 04/30/23  1145   PH, ARTERIAL 7.419  --    PCO2, ARTERIAL 33.3*  --    PO2 ART 89.3  --    FIO2 21 21   HCO3 ART 21.6  --    BASE EXCESS ART -2.5*  --    CARBOXYHEMOGLOBIN 1.3  --    CARBOXYHEMOGLOBIN (VENOUS)  --  1.4     Brief Urine Lab Results  (Last result in the past 365 days)      Color   Clarity   Blood   Leuk Est   Nitrite   Protein   CREAT   Urine HCG         04/30/23 1513 Yellow   Cloudy   Trace   Small (1+)   Negative   100 mg/dL (2+)               Microbiology Results (last 10 days)     Procedure Component Value - Date/Time    Urine Culture - Urine, Urine, Clean Catch [[id number]]  (Abnormal)  (Susceptibility) Collected: 04/30/23 1513    Lab Status: Final result Specimen: Urine, Clean Catch Updated: 05/02/23 0711     Urine Culture >100,000 CFU/mL Enterococcus faecalis    Narrative:      Colonization of the urinary tract without infection is common. Treatment is discouraged unless the patient is symptomatic, pregnant, or undergoing an invasive urologic procedure.    Susceptibility      Enterococcus faecalis      AMELIA      Ampicillin Susceptible      Levofloxacin Susceptible      Nitrofurantoin Susceptible      Tetracycline Resistant     Vancomycin Susceptible                                 CT Abdomen Pelvis With Contrast    Result Date: 4/30/2023  CT ABDOMEN PELVIS W CONTRAST Date of Exam: 4/30/2023 12:09 PM EDT Indication: abd pain and N/V. Comparison: CT abdomen and pelvis 3/30/2023 Technique: Axial CT images were obtained of the abdomen and pelvis following the uneventful intravenous administration of 85 mL Isovue-300. Reconstructed coronal and sagittal images were also obtained. Automated exposure control and iterative construction methods were used. Findings: Unremarkable appearance of the lower thorax, liver, gallbladder, bile ducts, spleen. Incidental note of pancreas divisum with otherwise unremarkable appearance of the pancreas. Redemonstration of diffuse thickening and multiple small nodules of the bilateral adrenal glands. There are couple subcentimeter round hypodensities in the left renal cortex which is too small to characterize, likely tiny renal cysts; otherwise grossly unremarkable appearance of the kidneys; previously seen mild hydronephrosis has resolved. Normal appearance of the ureters. There is greater than expected mild circumferential wall  thickening of the urinary bladder which otherwise appears unremarkable. Normal appearance of the uterus and adnexa. There is scattered  colonic diverticulosis without evidence of diverticulitis. Normal appendix. Unremarkable appearance of the terminal ileum. No evidence of bowel obstruction. No definite inflammatory change of the GI tract. Redemonstration of gastric stimulator device with pulse generator in the subcutaneous tissues of the right anterior mid abdomen. No abdominopelvic free fluid or conspicuous fat stranding. No pneumoperitoneum. Normal appearance of the vasculature. No lymphadenopathy. Unremarkable appearance of the body wall soft tissues. No acute or suspicious bony findings.     Impression: No acute abdominopelvic findings. There is greater than expected circumferential wall thickening of the urinary bladder and correlate with urinalysis to exclude cystitis. Scattered colonic diverticulosis without evidence of diverticulitis. Redemonstration of diffuse thickening and small nodularity of the bilateral adrenal glands, suboptimally characterized on this study. Electronically Signed: Gautam Minor  4/30/2023 12:32 PM EDT  Workstation ID: [id number]              Results for orders placed during the hospital encounter of 12/06/22    Adult Transthoracic Echo Complete W/ Cont if Necessary Per Protocol    Interpretation Summary  •  Left ventricular ejection fraction appears to be greater than 70%.  •  Left ventricular wall thickness is consistent with moderate concentric hypertrophy.  •  Left ventricular diastolic function is consistent with (grade I) impaired relaxation.  •  Estimated right ventricular systolic pressure from tricuspid regurgitation is mildly elevated (35-45 mmHg). Calculated right ventricular systolic pressure from tricuspid regurgitation is 39 mmHg.      Plan for Follow-up of Pending Labs/Results: Follow-up as directed    Discharge Details        Discharge Medications      ASK your doctor  about these medications      Instructions Start Date   acetaminophen 325 MG tablet  Commonly known as: TYLENOL   650 mg, Oral, Every 4 Hours PRN      albuterol sulfate  (90 Base) MCG/ACT inhaler  Commonly known as: PROVENTIL HFA;VENTOLIN HFA;PROAIR HFA   2 puffs, Inhalation, Every 4 Hours PRN      calcium carbonate 500 MG chewable tablet  Commonly known as: TUMS   2 tablets, Oral, 3 Times Daily PRN      carvedilol 6.25 MG tablet  Commonly known as: COREG   6.25 mg, Oral, 2 Times Daily With Meals      diphenhydrAMINE 25 MG tablet  Commonly known as: BENADRYL   25 mg, Oral, Every 6 Hours PRN      doxazosin 2 MG tablet  Commonly known as: CARDURA   2 mg, Oral, Nightly      ferrous sulfate 325 (65 Fe) MG tablet   TAKE 1 TABLET BY MOUTH ONCE DAILY WITH  BREAKFAST  **TAKE  WITH  ORANGE  JUICE  OR  VITAMIN  C**      insulin detemir 100 UNIT/ML injection  Commonly known as: LEVEMIR   10 Units, Subcutaneous, Daily      melatonin 5 MG tablet tablet   5 mg, Oral, Nightly PRN      metoclopramide 10 MG tablet  Commonly known as: REGLAN  Ask about: Should I take this medication?   10 mg, Oral, 3 Times Daily Before Meals      mirtazapine 7.5 MG tablet  Commonly known as: REMERON   7.5 mg, Oral, Nightly      Multivitamin tablet tablet  Generic drug: multivitamin   Take 1 tablet by mouth once daily      ondansetron 4 MG tablet  Commonly known as: ZOFRAN  Ask about: Should I take this medication?   4 mg, Oral, Every 4 Hours PRN      pantoprazole 40 MG EC tablet  Commonly known as: PROTONIX   40 mg, Oral, Daily      rivaroxaban 20 MG tablet  Commonly known as: XARELTO   20 mg, Oral, Daily With Dinner      sennosides-docusate 8.6-50 MG per tablet  Commonly known as: PERICOLACE   2 tablets, Oral, 2 Times Daily PRN      traMADol 50 MG tablet  Commonly known as: ULTRAM   50 mg, Oral, Every 6 Hours PRN      vitamin D 1.25 MG (42543 UT) capsule capsule  Commonly known as: ERGOCALCIFEROL   50,000 Units, Oral, Weekly             No  Known Allergies      Discharge Disposition: Home      Diet:  Hospital:  Diet Order   Procedures   • Diet: Diabetic Diets, Regular/House Diet, Gastrointestinal Diets; Consistent Carbohydrate; Fiber-Restricted, Low Irritant; Texture: Regular Texture (IDDSI 7); Fluid Consistency: Thin (IDDSI 0)       Activity: As tolerated         CODE STATUS:    Code Status and Medical Interventions:   Ordered at: 04/30/23 1335     Code Status (Patient has no pulse and is not breathing):    CPR (Attempt to Resuscitate)     Medical Interventions (Patient has pulse or is breathing):    Full Support       Future Appointments   Date Time Provider Department Center   7/3/2023  3:30 PM Gerson Ochoa MD [facility]                 Rajat Akers, HOWIE  05/05/23      Time Spent on Discharge:  I spent  41  minutes on this discharge activity which included: face-to-face encounter with the patient, reviewing the data in the system, coordination of the care with the nursing staff as well as consultants, documentation, and entering orders.

## 2023-05-05 NOTE — OUTREACH NOTE
Prep Survey    Flowsheet Row Responses   [facility] facility patient discharged from? [facility]   Is LACE score < 7 ? No   Eligibility [facility]   Date of Admission 04/30/23   Date of Discharge 05/05/23   Discharge Disposition Home or Self Care   Discharge diagnosis Refractory nausea and vomiting, Uncontrolled T1DM with DKA, UTI, HTN urgency   Does the patient have one of the following disease processes/diagnoses(primary or secondary)? Other   Does the patient have Home health ordered? No   Is there a DME ordered? No   Prep survey completed? Yes          Winter Whitfield Registered Nurse

## 2023-05-08 ENCOUNTER — TRANSITIONAL CARE MANAGEMENT TELEPHONE ENCOUNTER (OUTPATIENT)
Dept: CALL CENTER | Facility: HOSPITAL | Age: 63
End: 2023-05-08
Payer: COMMERCIAL

## 2023-05-08 NOTE — OUTREACH NOTE
Call Center TCM Note    Flowsheet Row Responses   [facility] patient discharged from? [facility]   Does the patient have one of the following disease processes/diagnoses(primary or secondary)? Other   TCM attempt successful? No   Unsuccessful attempts Attempt 2  [Out of date PCP verbal release ]   Call Status Voice mail issues  [Mailbox full, unable to leave message. ]          Lisy Rico RN    5/8/2023, 13:14 EDT

## 2023-05-08 NOTE — OUTREACH NOTE
Call Center TCM Note    Flowsheet Row Responses   [facility] patient discharged from? [facility]   Does the patient have one of the following disease processes/diagnoses(primary or secondary)? Other   TCM attempt successful? No   Unsuccessful attempts Attempt 1  [Out of date PCP verbal release]   Call Status Voice mail issues  [Mailbox full, unable to leave message. ]          Lisy Rico RN    5/8/2023, 08:55 EDT

## 2023-05-09 ENCOUNTER — TRANSITIONAL CARE MANAGEMENT TELEPHONE ENCOUNTER (OUTPATIENT)
Dept: CALL CENTER | Facility: HOSPITAL | Age: 63
End: 2023-05-09
Payer: COMMERCIAL

## 2023-05-09 NOTE — OUTREACH NOTE
Call Center TCM Note    Flowsheet Row Responses   [facility] patient discharged from? [place]   Does the patient have one of the following disease processes/diagnoses(primary or secondary)? Other   TCM attempt successful? No   Unsuccessful attempts Attempt 3          Hillary Ellington RN    5/9/2023, 15:30 EDT

## 2023-06-10 ENCOUNTER — HOSPITAL ENCOUNTER (OUTPATIENT)
Facility: HOSPITAL | Age: 63
Setting detail: OBSERVATION
Discharge: HOME | End: 2023-06-15
Attending: EMERGENCY MEDICINE | Admitting: INTERNAL MEDICINE
Payer: COMMERCIAL

## 2023-06-10 DIAGNOSIS — R73.9 HYPERGLYCEMIA: ICD-10-CM

## 2023-06-10 DIAGNOSIS — Z74.09 IMPAIRED FUNCTIONAL MOBILITY, BALANCE, GAIT, AND ENDURANCE: ICD-10-CM

## 2023-06-10 DIAGNOSIS — I16.0 HYPERTENSIVE URGENCY: ICD-10-CM

## 2023-06-10 DIAGNOSIS — R33.9 URINARY RETENTION: Primary | ICD-10-CM

## 2023-06-10 DIAGNOSIS — E11.42 DIABETIC PERIPHERAL NEUROPATHY: ICD-10-CM

## 2023-06-10 DIAGNOSIS — R41.0 CONFUSION: ICD-10-CM

## 2023-06-10 LAB
ALBUMIN SERPL-MCNC: 4.1 G/DL (ref 3.5–5.2)
ALBUMIN/GLOB SERPL: 1.3 G/DL
ALP SERPL-CCNC: 140 U/L (ref 39–117)
ALT SERPL W P-5'-P-CCNC: 17 U/L (ref 1–33)
ANION GAP SERPL CALCULATED.3IONS-SCNC: 14 MMOL/L (ref 5–15)
AST SERPL-CCNC: 23 U/L (ref 1–32)
BACTERIA UR QL AUTO: NORMAL /HPF
BASOPHILS # BLD AUTO: 0.01 10*3/MM3 (ref 0–0.2)
BASOPHILS NFR BLD AUTO: 0.1 % (ref 0–1.5)
BILIRUB SERPL-MCNC: 0.2 MG/DL (ref 0–1.2)
BILIRUB UR QL STRIP: NEGATIVE
BUN SERPL-MCNC: 18 MG/DL (ref 8–23)
BUN/CREAT SERPL: 13.2 (ref 7–25)
CALCIUM SPEC-SCNC: 9.5 MG/DL (ref 8.6–10.5)
CHLORIDE SERPL-SCNC: 93 MMOL/L (ref 98–107)
CLARITY UR: CLEAR
CO2 SERPL-SCNC: 22 MMOL/L (ref 22–29)
COLOR UR: YELLOW
CREAT SERPL-MCNC: 1.36 MG/DL (ref 0.57–1)
DEPRECATED RDW RBC AUTO: 38.9 FL (ref 37–54)
EGFRCR SERPLBLD CKD-EPI 2021: 43.6 ML/MIN/1.73
EOSINOPHIL # BLD AUTO: 0.03 10*3/MM3 (ref 0–0.4)
EOSINOPHIL NFR BLD AUTO: 0.4 % (ref 0.3–6.2)
ERYTHROCYTE [DISTWIDTH] IN BLOOD BY AUTOMATED COUNT: 12.4 % (ref 12.3–15.4)
GLOBULIN UR ELPH-MCNC: 3.1 GM/DL
GLUCOSE BLDC GLUCOMTR-MCNC: 333 MG/DL (ref 70–130)
GLUCOSE BLDC GLUCOMTR-MCNC: 470 MG/DL (ref 70–130)
GLUCOSE SERPL-MCNC: 740 MG/DL (ref 65–99)
GLUCOSE UR STRIP-MCNC: ABNORMAL MG/DL
HBA1C MFR BLD: 13.1 % (ref 4.8–5.6)
HCT VFR BLD AUTO: 35.3 % (ref 34–46.6)
HGB BLD-MCNC: 11.9 G/DL (ref 12–15.9)
HGB UR QL STRIP.AUTO: ABNORMAL
HYALINE CASTS UR QL AUTO: NORMAL /LPF
IMM GRANULOCYTES # BLD AUTO: 0.03 10*3/MM3 (ref 0–0.05)
IMM GRANULOCYTES NFR BLD AUTO: 0.4 % (ref 0–0.5)
KETONES UR QL STRIP: NEGATIVE
LEUKOCYTE ESTERASE UR QL STRIP.AUTO: NEGATIVE
LYMPHOCYTES # BLD AUTO: 0.94 10*3/MM3 (ref 0.7–3.1)
LYMPHOCYTES NFR BLD AUTO: 12 % (ref 19.6–45.3)
MCH RBC QN AUTO: 28.9 PG (ref 26.6–33)
MCHC RBC AUTO-ENTMCNC: 33.7 G/DL (ref 31.5–35.7)
MCV RBC AUTO: 85.7 FL (ref 79–97)
MONOCYTES # BLD AUTO: 0.36 10*3/MM3 (ref 0.1–0.9)
MONOCYTES NFR BLD AUTO: 4.6 % (ref 5–12)
NEUTROPHILS NFR BLD AUTO: 6.48 10*3/MM3 (ref 1.7–7)
NEUTROPHILS NFR BLD AUTO: 82.5 % (ref 42.7–76)
NITRITE UR QL STRIP: NEGATIVE
NRBC BLD AUTO-RTO: 0 /100 WBC (ref 0–0.2)
PH UR STRIP.AUTO: 7 [PH] (ref 5–8)
PLAT MORPH BLD: NORMAL
PLATELET # BLD AUTO: 194 10*3/MM3 (ref 140–450)
PMV BLD AUTO: 11.4 FL (ref 6–12)
POTASSIUM SERPL-SCNC: 3.8 MMOL/L (ref 3.5–5.2)
PROT SERPL-MCNC: 7.2 G/DL (ref 6–8.5)
PROT UR QL STRIP: ABNORMAL
RBC # BLD AUTO: 4.12 10*6/MM3 (ref 3.77–5.28)
RBC # UR STRIP: NORMAL /HPF
RBC MORPH BLD: NORMAL
REF LAB TEST METHOD: NORMAL
SODIUM SERPL-SCNC: 129 MMOL/L (ref 136–145)
SP GR UR STRIP: 1.02 (ref 1–1.03)
SQUAMOUS #/AREA URNS HPF: NORMAL /HPF
UROBILINOGEN UR QL STRIP: ABNORMAL
WBC # UR STRIP: NORMAL /HPF
WBC MORPH BLD: NORMAL
WBC NRBC COR # BLD: 7.85 10*3/MM3 (ref 3.4–10.8)

## 2023-06-10 PROCEDURE — 83036 HEMOGLOBIN GLYCOSYLATED A1C: CPT | Performed by: PHYSICIAN ASSISTANT

## 2023-06-10 PROCEDURE — 63710000001 INSULIN LISPRO (HUMAN) PER 5 UNITS: Performed by: INTERNAL MEDICINE

## 2023-06-10 PROCEDURE — 99223 1ST HOSP IP/OBS HIGH 75: CPT | Performed by: PHYSICIAN ASSISTANT

## 2023-06-10 PROCEDURE — 25010000002 ONDANSETRON PER 1 MG: Performed by: EMERGENCY MEDICINE

## 2023-06-10 PROCEDURE — 51702 INSERT TEMP BLADDER CATH: CPT

## 2023-06-10 PROCEDURE — 63710000001 INSULIN REGULAR HUMAN PER 5 UNITS: Performed by: EMERGENCY MEDICINE

## 2023-06-10 PROCEDURE — 36415 COLL VENOUS BLD VENIPUNCTURE: CPT

## 2023-06-10 PROCEDURE — G0378 HOSPITAL OBSERVATION PER HR: HCPCS

## 2023-06-10 PROCEDURE — 80053 COMPREHEN METABOLIC PANEL: CPT | Performed by: EMERGENCY MEDICINE

## 2023-06-10 PROCEDURE — 80307 DRUG TEST PRSMV CHEM ANLYZR: CPT | Performed by: PHYSICIAN ASSISTANT

## 2023-06-10 PROCEDURE — 85025 COMPLETE CBC W/AUTO DIFF WBC: CPT | Performed by: EMERGENCY MEDICINE

## 2023-06-10 PROCEDURE — 96375 TX/PRO/DX INJ NEW DRUG ADDON: CPT

## 2023-06-10 PROCEDURE — 82948 REAGENT STRIP/BLOOD GLUCOSE: CPT

## 2023-06-10 PROCEDURE — 81001 URINALYSIS AUTO W/SCOPE: CPT | Performed by: EMERGENCY MEDICINE

## 2023-06-10 PROCEDURE — 85007 BL SMEAR W/DIFF WBC COUNT: CPT | Performed by: EMERGENCY MEDICINE

## 2023-06-10 PROCEDURE — 99285 EMERGENCY DEPT VISIT HI MDM: CPT

## 2023-06-10 PROCEDURE — 96361 HYDRATE IV INFUSION ADD-ON: CPT

## 2023-06-10 PROCEDURE — 96374 THER/PROPH/DIAG INJ IV PUSH: CPT

## 2023-06-10 RX ORDER — ONDANSETRON 2 MG/ML
4 INJECTION INTRAMUSCULAR; INTRAVENOUS ONCE
Status: COMPLETED | OUTPATIENT
Start: 2023-06-10 | End: 2023-06-10

## 2023-06-10 RX ORDER — DEXTROSE MONOHYDRATE 25 G/50ML
25 INJECTION, SOLUTION INTRAVENOUS
Status: DISCONTINUED | OUTPATIENT
Start: 2023-06-10 | End: 2023-06-15 | Stop reason: HOSPADM

## 2023-06-10 RX ORDER — NICOTINE POLACRILEX 4 MG
15 LOZENGE BUCCAL
Status: DISCONTINUED | OUTPATIENT
Start: 2023-06-10 | End: 2023-06-15 | Stop reason: HOSPADM

## 2023-06-10 RX ORDER — LABETALOL HYDROCHLORIDE 5 MG/ML
10 INJECTION, SOLUTION INTRAVENOUS ONCE
Status: COMPLETED | OUTPATIENT
Start: 2023-06-10 | End: 2023-06-10

## 2023-06-10 RX ORDER — SODIUM CHLORIDE 9 MG/ML
125 INJECTION, SOLUTION INTRAVENOUS CONTINUOUS
Status: ACTIVE | OUTPATIENT
Start: 2023-06-10 | End: 2023-06-10

## 2023-06-10 RX ORDER — IBUPROFEN 600 MG/1
1 TABLET ORAL
Status: DISCONTINUED | OUTPATIENT
Start: 2023-06-10 | End: 2023-06-15 | Stop reason: HOSPADM

## 2023-06-10 RX ORDER — INSULIN LISPRO 100 [IU]/ML
5 INJECTION, SOLUTION INTRAVENOUS; SUBCUTANEOUS ONCE
Status: COMPLETED | OUTPATIENT
Start: 2023-06-10 | End: 2023-06-10

## 2023-06-10 RX ADMIN — LABETALOL HYDROCHLORIDE 10 MG: 5 INJECTION, SOLUTION INTRAVENOUS at 19:51

## 2023-06-10 RX ADMIN — SODIUM CHLORIDE 125 ML/HR: 9 INJECTION, SOLUTION INTRAVENOUS at 19:54

## 2023-06-10 RX ADMIN — ONDANSETRON 4 MG: 2 INJECTION INTRAMUSCULAR; INTRAVENOUS at 20:01

## 2023-06-10 RX ADMIN — INSULIN LISPRO 5 UNITS: 100 INJECTION, SOLUTION INTRAVENOUS; SUBCUTANEOUS at 21:25

## 2023-06-10 RX ADMIN — INSULIN HUMAN 8 UNITS: 100 INJECTION, SOLUTION PARENTERAL at 19:51

## 2023-06-10 NOTE — ED PROVIDER NOTES
Subjective   History of Present Illness  Patient is a pleasant 62-year-old female who presents today with pelvic fullness and urinary retention.  She states that last time she urinated was yesterday morning and began having the pelvic fullness, urge to urinate, and pain approximately 1-1/2 hours ago.  Had a similar episode 1 year ago but cannot remember the reason for that episode.  She did require catheter placement in the emergency department but she did not wear 1 home.  In addition to the urinary symptoms she admits that she has not taken her blood pressure medicine today but does not have a good reason for having skipped today's medication.  She denies other associated symptoms.  Denies fever, chills, chest pain,, vomiting, diarrhea, or other acute complaints.  She does admit that when she been getting up frequently trying to use the bathroom trying to urinate she does feel little winded from going back and forth from the bathroom so many times.    Review of Systems   Constitutional:  Negative for chills, fatigue and fever.   HENT:  Negative for congestion.    Respiratory:  Negative for cough, choking, chest tightness, shortness of breath and wheezing.    Cardiovascular:  Negative for chest pain.   Gastrointestinal:  Negative for abdominal pain, diarrhea, nausea and vomiting.   Genitourinary:  Negative for dysuria.   Neurological:  Negative for weakness and headaches.     Past Medical History:   Diagnosis Date    Acid reflux     Acute bronchitis     Cardiac murmur     Depression with anxiety 10/5/2020    Diabetes mellitus     Gastroesophageal reflux disease 5/13/2016    H/O echocardiogram 08/07/2012    i. LVEF 65%.ii. Mild LVH.iii. Borderline evidence of atrial septal aneurysm.  No PFO.     History of nuclear stress test 08/22/2014    Negative for ischemia and scars; LVEF 77%.      History of TIAs 7/15/2021    Hyperlipidemia     Hypertension     Impacted cerumen of both ears     Migraine     Migraines  10/31/2019    Self-catheterizes urinary bladder     Sinusitis     Stroke     Tobacco abuse     quit 4 days ago.      Urticaria     Vitamin D deficiency 2016       No Known Allergies    Past Surgical History:   Procedure Laterality Date    CAPSULE ENDOSCOPY  2021    Procedure: PILLCAM DEPLOYMENT;  Surgeon: Mikael Worthy MD;  Location:  [facility];  Service: Gastroenterology;;    COLONOSCOPY      COLONOSCOPY N/A 2021    Procedure: COLONOSCOPY;  Surgeon: Mikael Worthy MD;  Location:  [facility];  Service: Gastroenterology;  Laterality: N/A;    DENTAL PROCEDURE      ENDOSCOPY N/A 2021    Procedure: ESOPHAGOGASTRODUODENOSCOPY;  Surgeon: Brunner, Mark I, MD;  Location:  [facility];  Service: Gastroenterology;  Laterality: N/A;    ENDOSCOPY N/A 2021    Procedure: ESOPHAGOGASTRODUODENOSCOPY;  Surgeon: Mikael Worthy MD;  Location:  [facility];  Service: Gastroenterology;  Laterality: N/A;    ENDOSCOPY WITH JTUBE N/A 2022    Procedure: ESOPHAGOGASTRODUODENOSCOPY WITH JEJUNAL TUBE INSERTION;  Surgeon: Thom Glover MD;  Location:  [facility];  Service: Gastroenterology;  Laterality: N/A;    UPPER GASTROINTESTINAL ENDOSCOPY         Family History   Problem Relation Age of Onset    Anxiety disorder Other     Arthritis Other     ADD / ADHD Other     Heart disease Other         cardiac disorder    Depression Other     Diabetes Other     Hyperlipidemia Other     Hypertension Other     Lung cancer Other     Osteoporosis Other     Hypertension Brother     Diabetes Brother     Hyperlipidemia Mother     Hypertension Mother     Colon cancer Neg Hx        Social History     Socioeconomic History    Marital status:    Tobacco Use    Smoking status: Former     Packs/day: 0.25     Years: 30.00     Pack years: 7.50     Types: Cigarettes     Quit date: 2019     Years since quittin.0    Smokeless tobacco: Never    Tobacco comments:     BC PL  never smoker    Vaping Use    Vaping Use: Never used   Substance and Sexual Activity    Alcohol use: Yes     Alcohol/week: 1.0 standard drink     Types: 1 Glasses of wine per week     Comment: ocassional    Drug use: Yes     Frequency: 2.0 times per week     Types: Marijuana    Sexual activity: Not Currently     Comment: Single            Objective   Physical Exam  Vitals and nursing note reviewed.   Constitutional:       General: She is not in acute distress.     Appearance: Normal appearance.   HENT:      Head: Normocephalic and atraumatic.   Eyes:      Conjunctiva/sclera: Conjunctivae normal.      Pupils: Pupils are equal, round, and reactive to light.   Neck:      Thyroid: No thyromegaly.   Cardiovascular:      Rate and Rhythm: Normal rate and regular rhythm.      Heart sounds: Normal heart sounds. No murmur heard.    No friction rub. No gallop.   Pulmonary:      Effort: Pulmonary effort is normal. No respiratory distress.      Breath sounds: Normal breath sounds.   Abdominal:      General: Bowel sounds are normal. There is no distension.      Palpations: Abdomen is soft.      Tenderness: There is abdominal tenderness.   Musculoskeletal:         General: Normal range of motion.      Cervical back: Normal range of motion and neck supple.   Lymphadenopathy:      Cervical: No cervical adenopathy.   Skin:     General: Skin is warm and dry.      Capillary Refill: Capillary refill takes less than 2 seconds.   Neurological:      General: No focal deficit present.      Mental Status: She is alert and oriented to person, place, and time.   Psychiatric:         Mood and Affect: Mood normal.         Behavior: Behavior normal.         Thought Content: Thought content normal.       Procedures           ED Course          No orders to display     Vitals:    06/10/23 1704 06/10/23 1704 06/10/23 1709   Pulse: 98 96 90   Resp:  18    Temp:  98.5 °F (36.9 °C)    TempSrc:  Oral    SpO2: 100% 100% 100%   Weight:  61.2 kg (135 lb)   "  Height:  170 cm (66.93\")      Medications - No data to display  ECG/EMG Results (last 24 hours)       ** No results found for the last 24 hours. **          No orders to display                                            Medical Decision Making      Final diagnoses:   None       ED Disposition  ED Disposition       None            No follow-up provider specified.       Medication List      No changes were made to your prescriptions during this visit.         " Rel % 5.0 - 12.0 %  4.6 (L)   Eosinophil Rel % 0.3 - 6.2 %  0.4   Basophil Rel % 0.0 - 1.5 %  0.1   Immature Granulocyte Rel % 0.0 - 0.5 %  0.4   Neutrophils Absolute 1.70 - 7.00 10*3/mm3  6.48   Lymphocytes Absolute 0.70 - 3.10 10*3/mm3  0.94   Monocytes Absolute 0.10 - 0.90 10*3/mm3  0.36   Eosinophils Absolute 0.00 - 0.40 10*3/mm3  0.03   Basophils Absolute 0.00 - 0.20 10*3/mm3  0.01   Immature Grans, Absolute 0.00 - 0.05 10*3/mm3  0.03   WBC Morphology Normal   Normal   RBC morphology Normal   Normal   Platelet Morphology Normal   Normal   nRBC 0.0 - 0.2 /100 WBC  0.0   Color, UA Yellow, Straw  Yellow    Appearance, UA Clear  Clear    Specific Gravity, UA 1.001 - 1.030  1.024    pH, UA 5.0 - 8.0  7.0    Glucose Negative  >=1000 mg/dL (3+) !    Ketones, UA Negative  Negative    Blood, UA Negative  Trace !    Nitrite, UA Negative  Negative    Leukocytes, UA Negative  Negative    Protein, UA Negative  30 mg/dL (1+) !    Bilirubin, UA Negative  Negative    Urobilinogen, UA 0.2 - 1.0 E.U./dL  0.2 E.U./dL    RBC, UA None Seen, 0-2 /HPF 0-2    WBC, UA None Seen, 0-2 /HPF 0-2    Bacteria, UA None Seen, Trace /HPF None Seen    Squamous Epithelial Cells, UA None Seen, 0-2 /HPF 0-2    Hyaline Casts, UA 0 - 6 /LPF None Seen    Methodology:  Automated Microscopy    (C): Data is critically high  (L): Data is abnormally low  (H): Data is abnormally high  !: Data is abnormal                                         Medical Decision Making  Problems Addressed:  Hyperglycemia: complicated acute illness or injury  Hypertensive urgency: complicated acute illness or injury  Urinary retention: complicated acute illness or injury    Amount and/or Complexity of Data Reviewed  External Data Reviewed: notes.  Labs: ordered. Decision-making details documented in ED Course.  Discussion of management or test interpretation with external provider(s): Labs reviewed.  Given patient's significant hyperglycemia and suboptimal reliability and  understanding of her condition I do not feel she is safe for discharge at this time.  I discussed case with Dr. Tyler Mooney who also evaluated the patient in the emergency department.  He agrees that patient is most appropriate for admission and will admit for further evaluation and management.    Risk  OTC drugs.  Prescription drug management.  Decision regarding hospitalization.        Final diagnoses:   Urinary retention   Hyperglycemia   Hypertensive urgency   Confusion       ED Disposition  ED Disposition       ED Disposition   Decision to Admit    Condition   --    Comment   Level of Care: Telemetry [5]   Diagnosis: Urinary retention [[id number]]   Admitting Physician: LINK PERLA [[id number]]                        Abisai Pierson DO  06/21/23 1523

## 2023-06-11 LAB
AMPHET+METHAMPHET UR QL: NEGATIVE
AMPHETAMINES UR QL: NEGATIVE
ANION GAP SERPL CALCULATED.3IONS-SCNC: 12 MMOL/L (ref 5–15)
BARBITURATES UR QL SCN: NEGATIVE
BENZODIAZ UR QL SCN: NEGATIVE
BUN SERPL-MCNC: 17 MG/DL (ref 8–23)
BUN/CREAT SERPL: 14.8 (ref 7–25)
BUPRENORPHINE SERPL-MCNC: NEGATIVE NG/ML
CALCIUM SPEC-SCNC: 9.1 MG/DL (ref 8.6–10.5)
CANNABINOIDS SERPL QL: POSITIVE
CHLORIDE SERPL-SCNC: 108 MMOL/L (ref 98–107)
CO2 SERPL-SCNC: 24 MMOL/L (ref 22–29)
COCAINE UR QL: NEGATIVE
CREAT SERPL-MCNC: 1.15 MG/DL (ref 0.57–1)
D-LACTATE SERPL-SCNC: 1.5 MMOL/L (ref 0.5–2)
DEPRECATED RDW RBC AUTO: 38.2 FL (ref 37–54)
EGFRCR SERPLBLD CKD-EPI 2021: 53.3 ML/MIN/1.73
ERYTHROCYTE [DISTWIDTH] IN BLOOD BY AUTOMATED COUNT: 12.4 % (ref 12.3–15.4)
FENTANYL UR-MCNC: NEGATIVE NG/ML
GLUCOSE BLDC GLUCOMTR-MCNC: 116 MG/DL (ref 70–130)
GLUCOSE BLDC GLUCOMTR-MCNC: 121 MG/DL (ref 70–130)
GLUCOSE BLDC GLUCOMTR-MCNC: 125 MG/DL (ref 70–130)
GLUCOSE BLDC GLUCOMTR-MCNC: 192 MG/DL (ref 70–130)
GLUCOSE BLDC GLUCOMTR-MCNC: 194 MG/DL (ref 70–130)
GLUCOSE BLDC GLUCOMTR-MCNC: 231 MG/DL (ref 70–130)
GLUCOSE BLDC GLUCOMTR-MCNC: 273 MG/DL (ref 70–130)
GLUCOSE BLDC GLUCOMTR-MCNC: 278 MG/DL (ref 70–130)
GLUCOSE SERPL-MCNC: 211 MG/DL (ref 65–99)
HCT VFR BLD AUTO: 31.7 % (ref 34–46.6)
HGB BLD-MCNC: 11.2 G/DL (ref 12–15.9)
MAGNESIUM SERPL-MCNC: 1.9 MG/DL (ref 1.6–2.4)
MCH RBC QN AUTO: 30.3 PG (ref 26.6–33)
MCHC RBC AUTO-ENTMCNC: 35.3 G/DL (ref 31.5–35.7)
MCV RBC AUTO: 85.7 FL (ref 79–97)
METHADONE UR QL SCN: NEGATIVE
OPIATES UR QL: NEGATIVE
OXYCODONE UR QL SCN: NEGATIVE
PCP UR QL SCN: NEGATIVE
PHOSPHATE SERPL-MCNC: 2.7 MG/DL (ref 2.5–4.5)
PLATELET # BLD AUTO: 291 10*3/MM3 (ref 140–450)
PMV BLD AUTO: 11.1 FL (ref 6–12)
POTASSIUM SERPL-SCNC: 3 MMOL/L (ref 3.5–5.2)
PROPOXYPH UR QL: NEGATIVE
RBC # BLD AUTO: 3.7 10*6/MM3 (ref 3.77–5.28)
SODIUM SERPL-SCNC: 144 MMOL/L (ref 136–145)
TRICYCLICS UR QL SCN: NEGATIVE
WBC NRBC COR # BLD: 8.4 10*3/MM3 (ref 3.4–10.8)

## 2023-06-11 PROCEDURE — 63710000001 INSULIN REGULAR HUMAN PER 5 UNITS: Performed by: PHYSICIAN ASSISTANT

## 2023-06-11 PROCEDURE — 84100 ASSAY OF PHOSPHORUS: CPT | Performed by: INTERNAL MEDICINE

## 2023-06-11 PROCEDURE — 96361 HYDRATE IV INFUSION ADD-ON: CPT

## 2023-06-11 PROCEDURE — 82948 REAGENT STRIP/BLOOD GLUCOSE: CPT

## 2023-06-11 PROCEDURE — 63710000001 INSULIN DETEMIR PER 5 UNITS: Performed by: PHYSICIAN ASSISTANT

## 2023-06-11 PROCEDURE — 97161 PT EVAL LOW COMPLEX 20 MIN: CPT

## 2023-06-11 PROCEDURE — 83605 ASSAY OF LACTIC ACID: CPT | Performed by: PHYSICIAN ASSISTANT

## 2023-06-11 PROCEDURE — G0378 HOSPITAL OBSERVATION PER HR: HCPCS

## 2023-06-11 PROCEDURE — 80048 BASIC METABOLIC PNL TOTAL CA: CPT | Performed by: INTERNAL MEDICINE

## 2023-06-11 PROCEDURE — 99232 SBSQ HOSP IP/OBS MODERATE 35: CPT | Performed by: HOSPITALIST

## 2023-06-11 PROCEDURE — 83735 ASSAY OF MAGNESIUM: CPT | Performed by: INTERNAL MEDICINE

## 2023-06-11 PROCEDURE — 85027 COMPLETE CBC AUTOMATED: CPT | Performed by: PHYSICIAN ASSISTANT

## 2023-06-11 PROCEDURE — 97530 THERAPEUTIC ACTIVITIES: CPT

## 2023-06-11 RX ORDER — SODIUM CHLORIDE 0.9 % (FLUSH) 0.9 %
10 SYRINGE (ML) INJECTION EVERY 12 HOURS SCHEDULED
Status: DISCONTINUED | OUTPATIENT
Start: 2023-06-11 | End: 2023-06-15 | Stop reason: HOSPADM

## 2023-06-11 RX ORDER — CHOLECALCIFEROL (VITAMIN D3) 125 MCG
5 CAPSULE ORAL NIGHTLY PRN
Status: DISCONTINUED | OUTPATIENT
Start: 2023-06-11 | End: 2023-06-15 | Stop reason: HOSPADM

## 2023-06-11 RX ORDER — TRAMADOL HYDROCHLORIDE 50 MG/1
50 TABLET ORAL EVERY 6 HOURS PRN
Status: DISCONTINUED | OUTPATIENT
Start: 2023-06-11 | End: 2023-06-15 | Stop reason: HOSPADM

## 2023-06-11 RX ORDER — L.ACID,PARA/B.BIFIDUM/S.THERM 8B CELL
1 CAPSULE ORAL DAILY
Status: DISCONTINUED | OUTPATIENT
Start: 2023-06-11 | End: 2023-06-15 | Stop reason: HOSPADM

## 2023-06-11 RX ORDER — LOSARTAN POTASSIUM 50 MG/1
100 TABLET ORAL
Status: DISCONTINUED | OUTPATIENT
Start: 2023-06-11 | End: 2023-06-14

## 2023-06-11 RX ORDER — ACETAMINOPHEN 325 MG/1
650 TABLET ORAL EVERY 4 HOURS PRN
Status: DISCONTINUED | OUTPATIENT
Start: 2023-06-11 | End: 2023-06-15 | Stop reason: HOSPADM

## 2023-06-11 RX ORDER — SODIUM CHLORIDE 9 MG/ML
40 INJECTION, SOLUTION INTRAVENOUS AS NEEDED
Status: DISCONTINUED | OUTPATIENT
Start: 2023-06-11 | End: 2023-06-15 | Stop reason: HOSPADM

## 2023-06-11 RX ORDER — BISACODYL 5 MG/1
5 TABLET, DELAYED RELEASE ORAL DAILY PRN
Status: DISCONTINUED | OUTPATIENT
Start: 2023-06-11 | End: 2023-06-15 | Stop reason: HOSPADM

## 2023-06-11 RX ORDER — MIRTAZAPINE 15 MG/1
7.5 TABLET, FILM COATED ORAL NIGHTLY
Status: DISCONTINUED | OUTPATIENT
Start: 2023-06-11 | End: 2023-06-15 | Stop reason: HOSPADM

## 2023-06-11 RX ORDER — BISACODYL 10 MG
10 SUPPOSITORY, RECTAL RECTAL DAILY PRN
Status: DISCONTINUED | OUTPATIENT
Start: 2023-06-11 | End: 2023-06-15 | Stop reason: HOSPADM

## 2023-06-11 RX ORDER — AMOXICILLIN 250 MG
2 CAPSULE ORAL 2 TIMES DAILY
Status: DISCONTINUED | OUTPATIENT
Start: 2023-06-11 | End: 2023-06-15 | Stop reason: HOSPADM

## 2023-06-11 RX ORDER — FERROUS SULFATE 325(65) MG
325 TABLET ORAL
Status: DISCONTINUED | OUTPATIENT
Start: 2023-06-11 | End: 2023-06-15 | Stop reason: HOSPADM

## 2023-06-11 RX ORDER — TERAZOSIN 2 MG/1
2 CAPSULE ORAL NIGHTLY
Status: DISCONTINUED | OUTPATIENT
Start: 2023-06-11 | End: 2023-06-14

## 2023-06-11 RX ORDER — ALBUTEROL SULFATE 2.5 MG/3ML
2.5 SOLUTION RESPIRATORY (INHALATION) EVERY 4 HOURS PRN
Status: DISCONTINUED | OUTPATIENT
Start: 2023-06-11 | End: 2023-06-15 | Stop reason: HOSPADM

## 2023-06-11 RX ORDER — PANTOPRAZOLE SODIUM 40 MG/1
40 TABLET, DELAYED RELEASE ORAL DAILY
Status: DISCONTINUED | OUTPATIENT
Start: 2023-06-11 | End: 2023-06-15 | Stop reason: HOSPADM

## 2023-06-11 RX ORDER — POLYETHYLENE GLYCOL 3350 17 G/17G
17 POWDER, FOR SOLUTION ORAL DAILY PRN
Status: DISCONTINUED | OUTPATIENT
Start: 2023-06-11 | End: 2023-06-15 | Stop reason: HOSPADM

## 2023-06-11 RX ORDER — AMLODIPINE BESYLATE 10 MG/1
10 TABLET ORAL
Status: DISCONTINUED | OUTPATIENT
Start: 2023-06-11 | End: 2023-06-15 | Stop reason: HOSPADM

## 2023-06-11 RX ORDER — CARVEDILOL 12.5 MG/1
12.5 TABLET ORAL 2 TIMES DAILY WITH MEALS
Status: DISCONTINUED | OUTPATIENT
Start: 2023-06-11 | End: 2023-06-14

## 2023-06-11 RX ORDER — SODIUM CHLORIDE 0.9 % (FLUSH) 0.9 %
10 SYRINGE (ML) INJECTION AS NEEDED
Status: DISCONTINUED | OUTPATIENT
Start: 2023-06-11 | End: 2023-06-15 | Stop reason: HOSPADM

## 2023-06-11 RX ADMIN — PANTOPRAZOLE SODIUM 40 MG: 40 TABLET, DELAYED RELEASE ORAL at 09:39

## 2023-06-11 RX ADMIN — SENNOSIDES AND DOCUSATE SODIUM 2 TABLET: 50; 8.6 TABLET ORAL at 22:33

## 2023-06-11 RX ADMIN — TERAZOSIN HYDROCHLORIDE 2 MG: 2 CAPSULE ORAL at 22:34

## 2023-06-11 RX ADMIN — AMLODIPINE BESYLATE 10 MG: 10 TABLET ORAL at 09:39

## 2023-06-11 RX ADMIN — Medication 10 ML: at 01:42

## 2023-06-11 RX ADMIN — TERAZOSIN HYDROCHLORIDE 2 MG: 2 CAPSULE ORAL at 05:40

## 2023-06-11 RX ADMIN — INSULIN DETEMIR 10 UNITS: 100 INJECTION, SOLUTION SUBCUTANEOUS at 22:34

## 2023-06-11 RX ADMIN — CARVEDILOL 12.5 MG: 12.5 TABLET, FILM COATED ORAL at 09:39

## 2023-06-11 RX ADMIN — FERROUS SULFATE TAB 325 MG (65 MG ELEMENTAL FE) 325 MG: 325 (65 FE) TAB at 09:39

## 2023-06-11 RX ADMIN — MIRTAZAPINE 7.5 MG: 15 TABLET, FILM COATED ORAL at 22:34

## 2023-06-11 RX ADMIN — INSULIN HUMAN 5 UNITS: 100 INJECTION, SOLUTION PARENTERAL at 00:55

## 2023-06-11 RX ADMIN — CARVEDILOL 12.5 MG: 12.5 TABLET, FILM COATED ORAL at 17:36

## 2023-06-11 RX ADMIN — INSULIN DETEMIR 10 UNITS: 100 INJECTION, SOLUTION SUBCUTANEOUS at 01:41

## 2023-06-11 RX ADMIN — SENNOSIDES AND DOCUSATE SODIUM 2 TABLET: 50; 8.6 TABLET ORAL at 09:39

## 2023-06-11 RX ADMIN — LOSARTAN POTASSIUM 100 MG: 50 TABLET, FILM COATED ORAL at 09:39

## 2023-06-11 RX ADMIN — MIRTAZAPINE 7.5 MG: 15 TABLET, FILM COATED ORAL at 05:40

## 2023-06-11 RX ADMIN — RIVAROXABAN 20 MG: 20 TABLET, FILM COATED ORAL at 17:36

## 2023-06-11 RX ADMIN — Medication 1 CAPSULE: at 09:39

## 2023-06-11 RX ADMIN — INSULIN HUMAN 2 UNITS: 100 INJECTION, SOLUTION PARENTERAL at 17:35

## 2023-06-11 RX ADMIN — Medication 10 ML: at 22:34

## 2023-06-11 NOTE — CONSULTS
Urology Consult Note    Referring Provider: Dr. Rose Vasques  Reason for Consultation: Urinary retention, neurogenic bladder    Patient Care Team:  Monet Howe APRN as PCP - General (Nurse Practitioner)  Markus Stahl MD as Consulting Physician (Gastroenterology)  Maagli Pandey APRN as Nurse Practitioner (Gastroenterology)  Ludwig Mitchell MD as Consulting Physician (Cardiology)  Clara Rojas APRN as Nurse Practitioner (Nurse Practitioner)    Chief complaint   Chief Complaint   Patient presents with   • Difficulty Urinating         Subjective .     History of present illness:    Thelma Michael is a 62 y.o. female who was admitted for Urinary retention [R33.9]. Patient was referred by Dr. Vasques for evaluation of Urinary retention, neurogenic bladder. Patient is previous patient of Dr. Ted Eden for urinary retention and neurogenic bladder.  She performed intermittent catheterization at that time.  She reports she has not performed intermittent catheterization for the last 1 to 1.5 years.  She is admitted to the hospital for difficulties of urination and abdominal discomfort.  She has indwelling catheter at this time for urinary retention.  She is interested in restarting intermittent catheterizations.  Patient has history of atrial fibrillation, type 1 diabetes, CVA, tobacco abuse.  No current chest pain or shortness of air.  No fevers or chills.    Review of Systems  Pertinent items are noted in HPI, all other systems reviewed and negative    History  Past Medical History:   Diagnosis Date   • Acid reflux    • Acute bronchitis    • Cardiac murmur    • Depression with anxiety 10/5/2020   • Diabetes mellitus    • Gastroesophageal reflux disease 5/13/2016   • H/O echocardiogram 08/07/2012    i. LVEF 65%.ii. Mild LVH.iii. Borderline evidence of atrial septal aneurysm.  No PFO.    • History of nuclear stress test 08/22/2014    Negative for ischemia and scars; LVEF 77%.     •  History of TIAs 7/15/2021   • Hyperlipidemia    • Hypertension    • Impacted cerumen of both ears    • Migraine    • Migraines 10/31/2019   • Self-catheterizes urinary bladder    • Sinusitis    • Stroke    • Tobacco abuse     quit 4 days ago.     • Urticaria    • Vitamin D deficiency 5/13/2016   ,   Past Surgical History:   Procedure Laterality Date   • CAPSULE ENDOSCOPY  07/27/2021    Procedure: PILLCAM DEPLOYMENT;  Surgeon: Mikael Worthy MD;  Location:  [facility];  Service: Gastroenterology;;   • COLONOSCOPY     • COLONOSCOPY N/A 07/27/2021    Procedure: COLONOSCOPY;  Surgeon: Mikael Worthy MD;  Location:  [facility];  Service: Gastroenterology;  Laterality: N/A;   • DENTAL PROCEDURE     • ENDOSCOPY N/A 06/30/2021    Procedure: ESOPHAGOGASTRODUODENOSCOPY;  Surgeon: Brunner, Mark I, MD;  Location:  [facility];  Service: Gastroenterology;  Laterality: N/A;   • ENDOSCOPY N/A 07/27/2021    Procedure: ESOPHAGOGASTRODUODENOSCOPY;  Surgeon: Mikael Worthy MD;  Location:  [facility];  Service: Gastroenterology;  Laterality: N/A;   • ENDOSCOPY WITH JTUBE N/A 03/16/2022    Procedure: ESOPHAGOGASTRODUODENOSCOPY WITH JEJUNAL TUBE INSERTION;  Surgeon: Thom Glover MD;  Location:  [facility];  Service: Gastroenterology;  Laterality: N/A;   • UPPER GASTROINTESTINAL ENDOSCOPY     ,   Family History   Problem Relation Age of Onset   • Anxiety disorder Other    • Arthritis Other    • ADD / ADHD Other    • Heart disease Other         cardiac disorder   • Depression Other    • Diabetes Other    • Hyperlipidemia Other    • Hypertension Other    • Lung cancer Other    • Osteoporosis Other    • Hypertension Brother    • Diabetes Brother    • Hyperlipidemia Mother    • Hypertension Mother    • Colon cancer Neg Hx    ,   Social History     Tobacco Use   • Smoking status: Former     Packs/day: 0.25     Years: 30.00     Pack years: 7.50     Types: Cigarettes     Quit date: 5/28/2019      Years since quittin.0   • Smokeless tobacco: Never   • Tobacco comments:     BC PL never smoker    Vaping Use   • Vaping Use: Never used   Substance Use Topics   • Alcohol use: Yes     Alcohol/week: 1.0 standard drink     Types: 1 Glasses of wine per week     Comment: ocassional   • Drug use: Yes     Frequency: 2.0 times per week     Types: Marijuana   ,   Medications Prior to Admission   Medication Sig Dispense Refill Last Dose   • acetaminophen (TYLENOL) 325 MG tablet Take 2 tablets by mouth Every 4 (Four) Hours As Needed for Mild Pain . (Patient taking differently: Take 2 tablets by mouth Every 4 (Four) Hours As Needed for Mild Pain, Fever or Headache. OTC)   Past Week   • albuterol sulfate  (90 Base) MCG/ACT inhaler Inhale 2 puffs Every 4 (Four) Hours As Needed for Wheezing. (Patient taking differently: Inhale 2 puffs Every 4 (Four) Hours As Needed for Wheezing or Shortness of Air.) 18 g 5 Past Month   • amLODIPine (NORVASC) 10 MG tablet Take 1 tablet by mouth Daily. 30 tablet 0 Past Week   • calcium carbonate (TUMS) 500 MG chewable tablet Chew 1,000 mg 3 (Three) Times a Day As Needed for Indigestion or Heartburn. (Patient taking differently: Chew 2 tablets 3 (Three) Times a Day As Needed for Indigestion or Heartburn. OTC)   Past Week   • carvedilol (COREG) 12.5 MG tablet Take 1 tablet by mouth 2 (Two) Times a Day With Meals. 60 tablet 0 Past Week   • diphenhydrAMINE (BENADRYL) 25 MG tablet Take 1 tablet by mouth Every 6 (Six) Hours As Needed for Itching. (Patient taking differently: Take 1 tablet by mouth Every 6 (Six) Hours As Needed for Itching. OTC) 12 tablet 0 Past Week   • doxazosin (CARDURA) 2 MG tablet Take 1 tablet by mouth Every Night. (Patient taking differently: Take 1 mg by mouth Every Night.) 30 tablet 0 Past Week   • ferrous sulfate 325 (65 Fe) MG tablet TAKE 1 TABLET BY MOUTH ONCE DAILY WITH  BREAKFAST  **TAKE  WITH  ORANGE  JUICE  OR  VITAMIN  C** (Patient taking differently: Take  with Vitamin C or Juice) 30 tablet 0 Past Week   • insulin detemir (LEVEMIR) 100 UNIT/ML injection Inject 10 Units under the skin into the appropriate area as directed Daily for 30 days. (Patient taking differently: Inject 10 Units under the skin into the appropriate area as directed Every Night.) 10 mL 0 Past Week   • losartan (COZAAR) 100 MG tablet Take 1 tablet by mouth Daily. 30 tablet 0 Past Week   • melatonin 5 MG tablet tablet Take 1 tablet by mouth At Night As Needed (sleep). (Patient taking differently: Take 1 tablet by mouth At Night As Needed (Sleep). OTC)   Past Week   • mirtazapine (REMERON) 7.5 MG tablet Take 1 tablet by mouth Every Night. 30 tablet 1 Past Week   • Multivitamin tablet tablet Take 1 tablet by mouth once daily (Patient taking differently: Take 1 tablet by mouth Daily. OTC) 30 tablet 0 Past Week   • pantoprazole (PROTONIX) 40 MG EC tablet Take 1 tablet by mouth Daily. 30 tablet 5 Past Week   • rivaroxaban (XARELTO) 20 MG tablet Take 1 tablet by mouth Daily With Dinner. 30 tablet 2 Past Week   • sennosides-docusate (senna-docusate sodium) 8.6-50 MG per tablet Take 2 tablets by mouth 2 (Two) Times a Day As Needed for Constipation. 60 tablet 5 Past Week   • traMADol (ULTRAM) 50 MG tablet Take 1 tablet by mouth Every 6 (Six) Hours As Needed for Moderate Pain. 28 tablet 2 Past Week   • vitamin D (ERGOCALCIFEROL) 1.25 MG (14907 UT) capsule capsule Take 1 capsule by mouth 1 (One) Time Per Week. (Patient taking differently: Take 1 capsule by mouth 1 (One) Time Per Week. Friday) 4 capsule 2 Past Week   • lactobacillus acidophilus (RISAQUAD) capsule capsule Take 1 capsule by mouth Daily. 30 capsule 0 Unknown   , Scheduled Meds:  amLODIPine, 10 mg, Oral, Q24H  carvedilol, 12.5 mg, Oral, BID With Meals  ferrous sulfate, 325 mg, Oral, Daily With Breakfast  insulin detemir, 10 Units, Subcutaneous, Nightly  insulin regular, 2-7 Units, Subcutaneous, Q6H  lactobacillus acidophilus, 1 capsule, Oral,  Daily  losartan, 100 mg, Oral, Q24H  mirtazapine, 7.5 mg, Oral, Nightly  pantoprazole, 40 mg, Oral, Daily  rivaroxaban, 20 mg, Oral, Daily With Dinner  senna-docusate sodium, 2 tablet, Oral, BID  sodium chloride, 10 mL, Intravenous, Q12H  terazosin, 2 mg, Oral, Nightly    , Continuous Infusions:   , PRN Meds:  •  acetaminophen  •  albuterol  •  senna-docusate sodium **AND** polyethylene glycol **AND** bisacodyl **AND** bisacodyl  •  dextrose  •  dextrose  •  glucagon (human recombinant)  •  melatonin  •  sodium chloride  •  sodium chloride  •  traMADol and Allergies:  Patient has no known allergies.    Objective     Vital Signs   Temp:  [98.5 °F (36.9 °C)-99.1 °F (37.3 °C)] 99.1 °F (37.3 °C)  Heart Rate:  [] 83  Resp:  [16-18] 16  BP: (131-216)/() 190/85    Physical Exam:   General Appearance: alert, appears stated age and cooperative  Head: normocephalic, without obvious abnormality and atraumatic  Lungs: respirations regular, respirations even and respirations unlabored  Heart: regular rhythm & normal rate  Abdomen: soft non-tender  Neurologic: Mental Status orientated to person, place, time and situation  Psych: normal        Results Review:   I reviewed the patient's new clinical results.  Lab Results (last 24 hours)     Procedure Component Value Units Date/Time    POC Glucose Once [[id number]]  (Normal) Collected: 06/11/23 1232    Specimen: Blood Updated: 06/11/23 1234     Glucose 125 mg/dL      Comment: Meter: [id number] : [id number] Bibi Whittaker       POC Glucose Once [[id number]]  (Normal) Collected: 06/11/23 0927    Specimen: Blood Updated: 06/11/23 0928     Glucose 116 mg/dL      Comment: Meter: [id number] : [id number] Bibi Whittaker       CBC (No Diff) [[id number]]  (Abnormal) Collected: 06/11/23 0602    Specimen: Blood Updated: 06/11/23 0610     WBC 8.40 10*3/mm3      RBC 3.70 10*6/mm3      Hemoglobin 11.2 g/dL      Hematocrit 31.7 %      MCV 85.7 fL      MCH 30.3 pg      MCHC  35.3 g/dL      RDW 12.4 %      RDW-SD 38.2 fl      MPV 11.1 fL      Platelets 291 10*3/mm3     POC Glucose Once [[id number]]  (Normal) Collected: 06/11/23 0539    Specimen: Blood Updated: 06/11/23 0541     Glucose 121 mg/dL      Comment: Meter: [id number] : [id number] Dannie Kidd       Basic Metabolic Panel [[id number]]  (Abnormal) Collected: 06/11/23 0148    Specimen: Blood Updated: 06/11/23 0322     Glucose 211 mg/dL      BUN 17 mg/dL      Creatinine 1.15 mg/dL      Sodium 144 mmol/L      Potassium 3.0 mmol/L      Comment: Slight hemolysis detected by analyzer. Results may be affected.        Chloride 108 mmol/L      CO2 24.0 mmol/L      Calcium 9.1 mg/dL      BUN/Creatinine Ratio 14.8     Anion Gap 12.0 mmol/L      eGFR 53.3 mL/min/1.73     Narrative:      GFR Normal >60  Chronic Kidney Disease <60  Kidney Failure <15      Fentanyl, Urine - Urine, Clean Catch [[id number]]  (Normal) Collected: 06/10/23 1725    Specimen: Urine, Clean Catch Updated: 06/11/23 0248     Fentanyl, Urine Negative    Narrative:      Negative Threshold:      Fentanyl 5 ng/mL     The normal value for the drug tested is negative. This report includes final unconfirmed screening results to be used for medical treatment purposes only. Unconfirmed results must not be used for non-medical purposes such as employment or legal testing. Clinical consideration should be applied to any drug of abuse test, particularly when unconfirmed results are used.           Magnesium [[id number]]  (Normal) Collected: 06/11/23 0148    Specimen: Blood Updated: 06/11/23 0247     Magnesium 1.9 mg/dL     Phosphorus [[id number]]  (Normal) Collected: 06/11/23 0148    Specimen: Blood Updated: 06/11/23 0231     Phosphorus 2.7 mg/dL     Urine Drug Screen - Urine, Clean Catch [[id number]]  (Abnormal) Collected: 06/10/23 1725    Specimen: Urine, Clean Catch Updated: 06/11/23 0230     THC, Screen, Urine Positive     Phencyclidine (PCP), Urine Negative     Cocaine  Screen, Urine Negative     Methamphetamine, Ur Negative     Opiate Screen Negative     Amphetamine Screen, Urine Negative     Benzodiazepine Screen, Urine Negative     Tricyclic Antidepressants Screen Negative     Methadone Screen, Urine Negative     Barbiturates Screen, Urine Negative     Oxycodone Screen, Urine Negative     Propoxyphene Screen Negative     Buprenorphine, Screen, Urine Negative    Narrative:      Cutoff For Drugs Screened:    Amphetamines               500 ng/ml  Barbiturates               200 ng/ml  Benzodiazepines            150 ng/ml  Cocaine                    150 ng/ml  Methadone                  200 ng/ml  Opiates                    100 ng/ml  Phencyclidine               25 ng/ml  THC                            50 ng/ml  Methamphetamine            500 ng/ml  Tricyclic Antidepressants  300 ng/ml  Oxycodone                  100 ng/ml  Propoxyphene               300 ng/ml  Buprenorphine               10 ng/ml    The normal value for all drugs tested is negative. This report includes unconfirmed screening results, with the cutoff values listed, to be used for medical treatment purposes only.  Unconfirmed results must not be used for non-medical purposes such as employment or legal testing.  Clinical consideration should be applied to any drug of abuse test, particularly when unconfirmed results are used.      Lactic Acid, Plasma [[id number]]  (Normal) Collected: 06/11/23 0148    Specimen: Blood Updated: 06/11/23 0229     Lactate 1.5 mmol/L      Comment: Falsely depressed results may occur on samples drawn from patients receiving N-Acetylcysteine (NAC) or Metamizole.       POC Glucose Once [[id number]]  (Abnormal) Collected: 06/11/23 0141    Specimen: Blood Updated: 06/11/23 0142     Glucose 273 mg/dL      Comment: Meter: [id number] : [id number] Dannie Kidd       POC Glucose Once [[id number]]  (Abnormal) Collected: 06/10/23 2324    Specimen: Blood Updated: 06/10/23 2325     Glucose 333  mg/dL      Comment: Meter: [id number] : [id number] Kristy Reyes       Hemoglobin A1c [[id number]]  (Abnormal) Collected: 06/10/23 1831    Specimen: Blood Updated: 06/10/23 2122     Hemoglobin A1C 13.10 %     Narrative:      Hemoglobin A1C Ranges:    Increased Risk for Diabetes  5.7% to 6.4%  Diabetes                     >= 6.5%  Diabetic Goal                < 7.0%    POC Glucose Once [[id number]]  (Abnormal) Collected: 06/10/23 2048    Specimen: Blood Updated: 06/10/23 2050     Glucose 470 mg/dL      Comment: Physician Notified Meter: [id number] : [id number] Kaylen Rodriguez       Comprehensive Metabolic Panel [[id number]]  (Abnormal) Collected: 06/10/23 1831    Specimen: Blood Updated: 06/10/23 1900     Glucose 740 mg/dL      BUN 18 mg/dL      Creatinine 1.36 mg/dL      Sodium 129 mmol/L      Potassium 3.8 mmol/L      Comment: Specimen hemolyzed.  Results may be affected.        Chloride 93 mmol/L      CO2 22.0 mmol/L      Calcium 9.5 mg/dL      Total Protein 7.2 g/dL      Albumin 4.1 g/dL      ALT (SGPT) 17 U/L      Comment: Specimen hemolyzed.  Results may be affected.        AST (SGOT) 23 U/L      Alkaline Phosphatase 140 U/L      Total Bilirubin 0.2 mg/dL      Globulin 3.1 gm/dL      Comment: Calculated Result        A/G Ratio 1.3 g/dL      BUN/Creatinine Ratio 13.2     Anion Gap 14.0 mmol/L      eGFR 43.6 mL/min/1.73     Narrative:      GFR Normal >60  Chronic Kidney Disease <60  Kidney Failure <15      CBC & Differential [[id number]]  (Abnormal) Collected: 06/10/23 1831    Specimen: Blood Updated: 06/10/23 1852    Narrative:      The following orders were created for panel order CBC & Differential.  Procedure                               Abnormality         Status                     ---------                               -----------         ------                     CBC Auto Differential[[id number]]        Abnormal            Final result               Scan Slide[[id number]]                   Normal               Final result                 Please view results for these tests on the individual orders.    CBC Auto Differential [[id number]]  (Abnormal) Collected: 06/10/23 1831    Specimen: Blood Updated: 06/10/23 1852     WBC 7.85 10*3/mm3      RBC 4.12 10*6/mm3      Hemoglobin 11.9 g/dL      Hematocrit 35.3 %      MCV 85.7 fL      MCH 28.9 pg      MCHC 33.7 g/dL      RDW 12.4 %      RDW-SD 38.9 fl      MPV 11.4 fL      Platelets 194 10*3/mm3      Neutrophil % 82.5 %      Lymphocyte % 12.0 %      Monocyte % 4.6 %      Eosinophil % 0.4 %      Basophil % 0.1 %      Immature Grans % 0.4 %      Neutrophils, Absolute 6.48 10*3/mm3      Lymphocytes, Absolute 0.94 10*3/mm3      Monocytes, Absolute 0.36 10*3/mm3      Eosinophils, Absolute 0.03 10*3/mm3      Basophils, Absolute 0.01 10*3/mm3      Immature Grans, Absolute 0.03 10*3/mm3      nRBC 0.0 /100 WBC     Scan Slide [[id number]]  (Normal) Collected: 06/10/23 1831    Specimen: Blood Updated: 06/10/23 1852     RBC Morphology Normal     WBC Morphology Normal     Platelet Morphology Normal    Urinalysis, Microscopic Only - Straight Cath [[id number]] Collected: 06/10/23 1725    Specimen: Urine from Straight Cath Updated: 06/10/23 1744     RBC, UA 0-2 /HPF      WBC, UA 0-2 /HPF      Comment: Urine culture not indicated.        Bacteria, UA None Seen /HPF      Squamous Epithelial Cells, UA 0-2 /HPF      Hyaline Casts, UA None Seen /LPF      Methodology Automated Microscopy    Urinalysis With Culture If Indicated - Straight Cath [[id number]]  (Abnormal) Collected: 06/10/23 1725    Specimen: Urine from Straight Cath Updated: 06/10/23 1744     Color, UA Yellow     Appearance, UA Clear     pH, UA 7.0     Specific Gravity, UA 1.024     Glucose, UA >=1000 mg/dL (3+)     Ketones, UA Negative     Bilirubin, UA Negative     Blood, UA Trace     Protein, UA 30 mg/dL (1+)     Leuk Esterase, UA Negative     Nitrite, UA Negative     Urobilinogen, UA 0.2 E.U./dL    Narrative:      In absence of  clinical symptoms, the presence of pyuria, bacteria, and/or nitrites on the urinalysis result does not correlate with infection.         Imaging Results (Last 24 Hours)     ** No results found for the last 24 hours. **          Labs  Urine Microscopy:   Results from last 7 days   Lab Units 06/10/23  1725   RBC UA /HPF 0-2   WBC UA /HPF 0-2   , Urinalysis:   Results from last 7 days   Lab Units 06/10/23  1725   PH, URINE  7.0   PROTEIN UA  30 mg/dL (1+)*   GLUCOSE UA  >=1000 mg/dL (3+)*   KETONES UA  Negative   , Urine Culture:   , BMP:   Results from last 7 days   Lab Units 06/11/23  0148 06/10/23  1831   SODIUM mmol/L 144 129*   POTASSIUM mmol/L 3.0* 3.8   CALCIUM mg/dL 9.1 9.5   CHLORIDE mmol/L 108* 93*   CO2 mmol/L 24.0 22.0   BUN mg/dL 17 18   CREATININE mg/dL 1.15* 1.36*   ALBUMIN g/dL  --  4.1   BILIRUBIN mg/dL  --  0.2   ALK PHOS U/L  --  140*    and CBC:   Results from last 7 days   Lab Units 06/11/23  0602   WBC 10*3/mm3 8.40   RBC 10*6/mm3 3.70*   HEMOGLOBIN g/dL 11.2*   HEMATOCRIT % 31.7*   PLATELETS 10*3/mm3 291       Imaging  none      Assessment   62 y.o. female with Chronic urinary retention, neurogenic bladder      Plan   Patient with current indwelling catheter.  She was given options of restarting intermittent catheterization, chronic urethral catheter, SP catheter placement.  She is not interested in chronic catheter and wishes to proceed with resuming intermittent catheterizations.  This can be restarted during inpatient stay and patient can be continued on intermittent catheterization as outpatient.      I discussed the patients findings and my recommendations with patient    Matthew David MD  06/11/23  12:37 EDT

## 2023-06-11 NOTE — THERAPY EVALUATION
Patient Name: Thelma Michael  : 1960    MRN: [id number]                              Today's Date: 2023       Admit Date: 6/10/2023    Visit Dx:     ICD-10-CM ICD-9-CM   1. Urinary retention  R33.9 788.20   2. Hyperglycemia  R73.9 790.29   3. Hypertensive urgency  I16.0 401.9   4. Impaired functional mobility, balance, gait, and endurance  Z74.09 V49.89     Patient Active Problem List   Diagnosis   • Diabetic peripheral neuropathy   • Hyperlipidemia   • Hypertension   • Osteoporosis   • Tobacco use   • Heart valve disease   • Iron deficiency anemia secondary to inadequate dietary iron intake   • Acute kidney injury   • DKA (diabetic ketoacidoses)   • Hypertensive urgency   • Uncontrolled type 1 diabetes mellitus with hyperglycemia   • Gastroparesis   • PFO (patent foramen ovale)   • Atrial fibrillation and flutter   • Intractable vomiting   • Hypokalemia   • Refractory nausea and vomiting   • HHS vs mild DKA   • CKD (chronic kidney disease) stage 3, GFR 30-59 ml/min   • Hypertensive urgency   • Urinary retention     Past Medical History:   Diagnosis Date   • Acid reflux    • Acute bronchitis    • Cardiac murmur    • Depression with anxiety 10/5/2020   • Diabetes mellitus    • Gastroesophageal reflux disease 2016   • H/O echocardiogram 2012    i. LVEF 65%.ii. Mild LVH.iii. Borderline evidence of atrial septal aneurysm.  No PFO.    • History of nuclear stress test 2014    Negative for ischemia and scars; LVEF 77%.     • History of TIAs 7/15/2021   • Hyperlipidemia    • Hypertension    • Impacted cerumen of both ears    • Migraine    • Migraines 10/31/2019   • Self-catheterizes urinary bladder    • Sinusitis    • Stroke    • Tobacco abuse     quit 4 days ago.     • Urticaria    • Vitamin D deficiency 2016     Past Surgical History:   Procedure Laterality Date   • CAPSULE ENDOSCOPY  2021    Procedure: PILLCAM DEPLOYMENT;  Surgeon: Mikael Worthy MD;  [facility];  Service: Gastroenterology;;   • COLONOSCOPY     • COLONOSCOPY N/A 07/27/2021    Procedure: COLONOSCOPY;  Surgeon: Mikael Worthy MD;  Location:  [facility];  Service: Gastroenterology;  Laterality: N/A;   • DENTAL PROCEDURE     • ENDOSCOPY N/A 06/30/2021    Procedure: ESOPHAGOGASTRODUODENOSCOPY;  Surgeon: Brunner, Mark I, MD;  Location:  [facility];  Service: Gastroenterology;  Laterality: N/A;   • ENDOSCOPY N/A 07/27/2021    Procedure: ESOPHAGOGASTRODUODENOSCOPY;  Surgeon: Mikael Worthy MD;  Location:  [facility];  Service: Gastroenterology;  Laterality: N/A;   • ENDOSCOPY WITH JTUBE N/A 03/16/2022    Procedure: ESOPHAGOGASTRODUODENOSCOPY WITH JEJUNAL TUBE INSERTION;  Surgeon: Thom Glover MD;  Location:  [facility];  Service: Gastroenterology;  Laterality: N/A;   • UPPER GASTROINTESTINAL ENDOSCOPY        General Information     Row Name 06/11/23 1500          Physical Therapy Time and Intention    Document Type evaluation  -LS     Mode of Treatment physical therapy  -     Row Name 06/11/23 1500          General Information    Patient Profile Reviewed yes  -LS     Prior Level of Function independent:;gait;transfer;bed mobility  Pt said she uses a 2-wheel RW when her  back hurts; otherwise, she walks without an a.d. Pt said she is able to drive but hasn't been driving lately.  -LS     Existing Precautions/Restrictions fall;other (see comments)  Ansari catheter  -LS     Row Name 06/11/23 1500          Living Environment    People in Home child(bronson), adult  Pt and her son live together. Pt said dtr comes and helps her as well.  -LS     Row Name 06/11/23 1500          Home Main Entrance    Number of Stairs, Main Entrance other (see comments)  pt said she lives in 2nd floor apt and has approx 20 steps to enter and that there is a HR on each side.  -LS     Row Name 06/11/23 1500          Stairs Within Home, Primary    Stairs, Within Home, Primary Pt said she has approx 15  steps without a HR inside  -LS     Row Name 06/11/23 1500          Cognition    Orientation Status (Cognition) oriented x 4  -LS     Row Name 06/11/23 1500          Safety Issues, Functional Mobility    Impairments Affecting Function (Mobility) balance;endurance/activity tolerance;strength  -LS           User Key  (r) = Recorded By, (t) = Taken By, (c) = Cosigned By    Initials Name Provider Type    Sydnee Mckeon, YUMI Physical Therapist               Mobility     Row Name 06/11/23 1300          Bed Mobility    Bed Mobility supine-sit-supine  -LS     Supine-Sit-Supine [place] (Bed Mobility) independent  -LS     Comment, (Bed Mobility) indep supine/sit on flat bed surface without bedrail  -     Row Name 06/11/23 1300          Sit-Stand Transfer    Sit-Stand [place] (Transfers) minimum assist (75% patient effort)  -LS     Assistive Device (Sit-Stand Transfers) walker, front-wheeled  -LS     Comment, (Sit-Stand Transfer) min A sit to stand from low toilet surface. CGA sit to stand from bed. vcs for hand placement.  -     Row Name 06/11/23 1300          Gait/Stairs (Locomotion)    [place] Level (Gait) contact guard  -LS     Assistive Device (Gait) walker, front-wheeled  -LS     Distance in Feet (Gait) 15' X 2  -LS     Deviations/Abnormal Patterns (Gait) gait speed decreased;stride length decreased  -LS     Bilateral Gait Deviations forward flexed posture  -LS           User Key  (r) = Recorded By, (t) = Taken By, (c) = Cosigned By    Initials Name Provider Type    Sydnee Mckeon, PT Physical Therapist               Obj/Interventions     Row Name 06/11/23 1500          Range of Motion Comprehensive    General Range of Motion bilateral lower extremity ROM WFL  -     Row Name 06/11/23 1500          Strength Comprehensive (MMT)    Comment, General Manual Muscle Testing (MMT) Assessment B hip flexors and B ankle dorsiflexors 4-/5. B knee extensors 4+/5.  -     Row Name 06/11/23 1500           Balance    Balance Assessment standing static balance;standing dynamic balance  -LS     Static Standing Balance standby assist  -LS     Dynamic Standing Balance contact guard  -LS     Position/Device Used, Standing Balance walker, rolling  -LS     Balance Interventions standing;sit to stand;supported;weight shifting activity  -LS     Comment, Balance pt took steps to her L toward HOB, using RW.  -LS           User Key  (r) = Recorded By, (t) = Taken By, (c) = Cosigned By    Initials Name Provider Type    LS Sydnee Delarosa, PT Physical Therapist               Goals/Plan     Row Name 06/11/23 1500          Transfer Goal 1 (PT)    Activity/Assistive Device (Transfer Goal 1, PT) sit-to-stand/stand-to-sit;walker, rolling  -LS     [place] Level/Cues Needed (Transfer Goal 1, PT) modified independence  -LS     Time Frame (Transfer Goal 1, PT) 10 days  -LS     Progress/Outcome (Transfer Goal 1, PT) goal ongoing  -LS     Row Name 06/11/23 1500          Gait Training Goal 1 (PT)    Activity/Assistive Device (Gait Training Goal 1, PT) gait (walking locomotion);assistive device use;walker, rolling  -LS     [place] Level (Gait Training Goal 1, PT) modified independence  -LS     Distance (Gait Training Goal 1, PT) 100  -LS     Time Frame (Gait Training Goal 1, PT) 10 days  -LS     Progress/Outcome (Gait Training Goal 1, PT) goal ongoing  -LS     Row Name 06/11/23 1500          Stairs Goal 1 (PT)    Activity/Assistive Device (Stairs Goal 1, PT) ascending stairs;descending stairs  -LS     [place] Level/Cues Needed (Stairs Goal 1, PT) contact guard required  -LS     Number of Stairs (Stairs Goal 1, PT) 10 with HR  -LS     Time Frame (Stairs Goal 1, PT) 10 days  -LS     Progress/Outcome (Stairs Goal 1, PT) goal ongoing  -LS     Row Name 06/11/23 1500          Therapy Assessment/Plan (PT)    Planned Therapy Interventions (PT) balance training;gait training;home exercise program;patient/family  education;postural re-education;stair training;strengthening;transfer training  -           User Key  (r) = Recorded By, (t) = Taken By, (c) = Cosigned By    Initials Name Provider Type    Sydnee Mckeon, PT Physical Therapist               Clinical Impression     Row Name 06/11/23 1500          Pain    Pretreatment Pain Rating 0/10 - no pain  -LS     Posttreatment Pain Rating 0/10 - no pain  -[facility] Name 06/11/23 1500          Plan of Care Review    Outcome Evaluation Pt presents with decreased functional mobility compared to baseline. Pt would benefit from skilled PT services to improve functional independence. Recommend home with family and HHPT, pending progress.  -     Row Name 06/11/23 1500          Therapy Assessment/Plan (PT)    Rehab Potential (PT) good, to achieve stated therapy goals  -     Criteria for Skilled Interventions Met (PT) yes;meets criteria  -     Therapy Frequency (PT) daily  -[facility] Name 06/11/23 1500          Vital Signs    Pre Systolic BP Rehab --  VSS  -[facility] 06/11/23 1500          Positioning and Restraints    Pre-Treatment Position in bed  -LS     Post Treatment Position bed  -LS     In Bed notified nsg;supine;call light within reach;encouraged to call for assist;exit alarm on  -           User Key  (r) = Recorded By, (t) = Taken By, (c) = Cosigned By    Initials Name Provider Type    Sydnee Mckeon, PT Physical Therapist               Outcome Measures     Row Name 06/11/23 1500 06/11/23 0847       How much help from another person do you currently need...    Turning from your back to your side while in flat bed without using bedrails? 4  -LS 4  -MH    Moving from lying on back to sitting on the side of a flat bed without bedrails? 4  -LS 3  -MH    Moving to and from a bed to a chair (including a wheelchair)? 3  -LS 3  -MH    Standing up from a chair using your arms (e.g., wheelchair, bedside chair)? 3  -LS 3  -MH    Climbing 3-5 steps with  a railing? 3  -LS 2  -MH    To walk in hospital room? 3  -LS 2  -MH    AM-PAC 6 Clicks Score (PT) 20  -LS 17  -MH    Highest level of mobility 6 --> Walked 10 steps or more  - 5 --> Static standing  -    Row Name 06/11/23 1500          Functional Assessment    Outcome Measure Options AM-PAC 6 Clicks Basic Mobility (PT)  -           User Key  (r) = Recorded By, (t) = Taken By, (c) = Cosigned By    Initials Name Provider Type    LS Sydnee Delarosa, PT Physical Therapist     Jomar Packer, RN Registered Nurse                             Physical Therapy Education     Title: PT OT SLP Therapies (In Progress)     Topic: Physical Therapy (In Progress)     Point: Mobility training (Done)     Learning Progress Summary           Patient Acceptance, E, VU,NR by  at 6/11/2023 1500                   Point: Home exercise program (Not Started)     Learner Progress:  Not documented in this visit.          Point: Body mechanics (Not Started)     Learner Progress:  Not documented in this visit.          Point: Precautions (Not Started)     Learner Progress:  Not documented in this visit.                      User Key     Initials Effective Dates Name Provider Type Discipline     05/31/23 -  Sydnee Delarosa, PT Physical Therapist PT              PT Recommendation and Plan  Planned Therapy Interventions (PT): balance training, gait training, home exercise program, patient/family education, postural re-education, stair training, strengthening, transfer training  Outcome Evaluation: Pt presents with decreased functional mobility compared to baseline. Pt would benefit from skilled PT services to improve functional independence. Recommend home with family and HHPT, pending progress.     Time Calculation:    PT Charges     Row Name 06/11/23 1500             Time Calculation    Start Time 1500  -      PT Received On 06/11/23  -      PT Goal Re-Cert Due Date 06/21/23  -         Time Calculation- PT    Total  Timed Code Minutes- PT 10 minute(s)  -LS         Timed Charges    57513 - PT Therapeutic Activity Minutes 10  -LS         Untimed Charges    PT Eval/Re-eval Minutes 55  -LS         Total Minutes    Timed Charges Total Minutes 10  -LS      Untimed Charges Total Minutes 55  -LS       Total Minutes 65  -LS            User Key  (r) = Recorded By, (t) = Taken By, (c) = Cosigned By    Initials Name Provider Type    Sydnee Mckeon, PT Physical Therapist              Therapy Charges for Today     Code Description Service Date Service Provider Modifiers Qty    77305652001 HC PT EVAL LOW COMPLEXITY 4 6/11/2023 Sydnee Delarosa, PT GP 1    18476686001 HC PT THERAPEUTIC ACT EA 15 MIN 6/11/2023 Sydnee Delarosa, PT GP 1          PT G-Codes  Outcome Measure Options: AM-PAC 6 Clicks Basic Mobility (PT)  AM-PAC 6 Clicks Score (PT): 20  PT Discharge Summary  Anticipated Discharge Disposition (PT): home with assist, home with home health (HHPT, pending progress)    Sydnee Delarosa, PT  6/11/2023

## 2023-06-11 NOTE — PROGRESS NOTES
[facility]  PROGRESS NOTE    Patient Name: Thelma Michael  : 1960  MRN: [id number]    Date of Admission: 6/10/2023  Primary Care Physician: Monet Howe APRN    Subjective   Subjective     CC:  F/U nausea, vomiting    HPI:  Patient seen this morning, reports feeling better, less nauseous. Reports she has not done self cath in at least a year. She does not have a urologist.     ROS:  Gen-no fevers, no chills  CV-no chest pain, no palpitations  Resp-no cough, no dyspnea  GI-improved nausea, no abd pain    Objective   Objective     Vital Signs:   Temp:  [98.5 °F (36.9 °C)-99.1 °F (37.3 °C)] 99.1 °F (37.3 °C)  Heart Rate:  [] 83  Resp:  [16-18] 16  BP: (131-216)/() 190/85     Physical Exam:  Gen-no acute distress, chronically ill appearing  HENT-NCAT, mucous membranes moist  CV-RRR, S1 S2 normal, no m/r/g  Resp-CTAB, no wheezes or rales  Abd-soft, NT, ND, +BS  Ext-no edema  Neuro-A&Ox3, no focal deficits  Skin-no rashes  Psych-appropriate mood      Results Reviewed:  LAB RESULTS:      Lab 23  0602 23  0148 06/10/23  1831   WBC 8.40  --  7.85   HEMOGLOBIN 11.2*  --  11.9*   HEMATOCRIT 31.7*  --  35.3   PLATELETS 291  --  194   NEUTROS ABS  --   --  6.48   IMMATURE GRANS (ABS)  --   --  0.03   LYMPHS ABS  --   --  0.94   MONOS ABS  --   --  0.36   EOS ABS  --   --  0.03   MCV 85.7  --  85.7   LACTATE  --  1.5  --          Lab 23  0148 06/10/23  1831   SODIUM 144 129*   POTASSIUM 3.0* 3.8   CHLORIDE 108* 93*   CO2 24.0 22.0   ANION GAP 12.0 14.0   BUN 17 18   CREATININE 1.15* 1.36*   EGFR 53.3* 43.6*   GLUCOSE 211* 740*   CALCIUM 9.1 9.5   MAGNESIUM 1.9  --    PHOSPHORUS 2.7  --    HEMOGLOBIN A1C  --  13.10*         Lab 06/10/23  1831   TOTAL PROTEIN 7.2   ALBUMIN 4.1   GLOBULIN 3.1   ALT (SGPT) 17   AST (SGOT) 23   BILIRUBIN 0.2   ALK PHOS 140*                     Brief Urine Lab Results  (Last result in the past 365  days)        Color   Clarity   Blood   Leuk Est   Nitrite   Protein   CREAT   Urine HCG        06/10/23 1725 Yellow   Clear   Trace   Negative   Negative   30 mg/dL (1+)                   Microbiology Results Abnormal       None            No radiology results from the last 24 hrs    Results for orders placed during the hospital encounter of 12/06/22    Adult Transthoracic Echo Complete W/ Cont if Necessary Per Protocol    Interpretation Summary    Left ventricular ejection fraction appears to be greater than 70%.    Left ventricular wall thickness is consistent with moderate concentric hypertrophy.    Left ventricular diastolic function is consistent with (grade I) impaired relaxation.    Estimated right ventricular systolic pressure from tricuspid regurgitation is mildly elevated (35-45 mmHg). Calculated right ventricular systolic pressure from tricuspid regurgitation is 39 mmHg.      Current medications:  Scheduled Meds:amLODIPine, 10 mg, Oral, Q24H  carvedilol, 12.5 mg, Oral, BID With Meals  ferrous sulfate, 325 mg, Oral, Daily With Breakfast  insulin detemir, 10 Units, Subcutaneous, Nightly  insulin regular, 2-7 Units, Subcutaneous, Q6H  lactobacillus acidophilus, 1 capsule, Oral, Daily  losartan, 100 mg, Oral, Q24H  mirtazapine, 7.5 mg, Oral, Nightly  pantoprazole, 40 mg, Oral, Daily  rivaroxaban, 20 mg, Oral, Daily With Dinner  senna-docusate sodium, 2 tablet, Oral, BID  sodium chloride, 10 mL, Intravenous, Q12H  terazosin, 2 mg, Oral, Nightly      Continuous Infusions:   PRN Meds:.  acetaminophen    albuterol    senna-docusate sodium **AND** polyethylene glycol **AND** bisacodyl **AND** bisacodyl    dextrose    dextrose    glucagon (human recombinant)    melatonin    sodium chloride    sodium chloride    traMADol    Assessment & Plan   Assessment & Plan     Active Hospital Problems    Diagnosis  POA    Urinary retention [R33.9]  Yes    CKD (chronic kidney disease) stage 3, GFR 30-59 ml/min [N18.30]  Yes     Refractory nausea and vomiting [R11.2]  Yes    PFO (patent foramen ovale) [Q21.12]  Not Applicable    Atrial fibrillation and flutter [I48.91, I48.92]  Yes    Gastroparesis [K31.84]  Yes    Uncontrolled type 1 diabetes mellitus with hyperglycemia [E10.65]  Yes    Acute kidney injury [N17.9]  Yes    Iron deficiency anemia secondary to inadequate dietary iron intake [D50.8]  Yes    Heart valve disease [I38]  Yes    Hypertension [I10]  Yes    Hyperlipidemia [E78.5]  Yes    Diabetic peripheral neuropathy [E11.42]  Yes      Resolved Hospital Problems   No resolved problems to display.        Brief Hospital Course to date:  Thelma Michael is a 62 y.o. female with past medical history significant for poorly controlled type 1 diabetes, Afib on Xarelto, iron deficiency anemia, gastroparesis s/p gastric stimulator, HTN, HLD, migraines, chronic urinary retention (previously performed self-catheterization), strokes (chronic right lentiform nucleus and left cerebellar lacunar infarcts found on MRI in February of 2023), tobacco abuse, and GERD. Patient is well known to this service due to multiple admissions concerning DKA, gastroparesis, and urinary retention. She presents with complaints of difficulty urinating over the past 24 hours. Associated nausea and vomiting. Glucose 740 on arrival.    This patient's problems and plans were partially entered by my partner and updated as appropriate by me 06/11/23. Copied text in this note has been reviewed and is accurate as of today's date.     *All problems are new to me today.    Hyperglycemia  Diabetic gastroparesis with intractable nausea and vomiting  Type 1 diabetes mellitus, uncontrolled, HbA1c 13.1%  - No ketonuria, serum CO2 favorable - do not suspect DKA  - Improved with aggressive hydration and insulin  - Last admissions in April 2023 she was noted to have issues with her insulin pump and had stopped using it - was prescribed 10 units Levemir nightly and instructed to  F/U with her endocrinologist but has not done so yet: has appointment with Dr. Ochoa 7/3/23  - Continue Levemir 10 units daily + SSI (she may need new prescription for insulin at discharge)  - Continue IV fluids  - Diabetic educator  - Zofran and Reglan as needed  - Follows with  gastroparesis clinic in [place], had appointment on 4/20/2023 to adjust her gastric stimulator and she reports she did follow up and had settings adjusted, but I cannot find documentation of this in Epic/Care Everywhere     CKD 3  Elevated Cr  - Baseline Cr ~0.9-1.25, slightly elevated at 1.36 on admission  - Improved with IV fluids  - Avoid nephrotoxins     Urinary retention  - Appears to be a chronic/recurrent issue, likely secondary to neurogenic bladder; patient reports she previously used to perform self-catheterization but has not done so in about a year and does not follow with a urologist  - UA negative for infection  - Straight cath x2 resulted in 2200 mL output  - Ansari anchored   - Consult Urology to see if patient will need to continue with indwelling Ansari or will need to resume self-catheterization     HTN urgency  - Secondary to not being able to take her PO meds due to N/V  - Continue Coreg, Cardura, Amlodipine, Losartan    Afib  - Continue Coreg, Xarelto     GERD  - Continue Pepcid with hx of C.diff       Expected Discharge Location and Transportation: home  Expected Discharge anticipate could be discharged home as early as tomorrow  Expected Discharge Date: 6/12/2023; Expected Discharge Time:      DVT prophylaxis:  Medical DVT prophylaxis orders are present.     AM-PAC 6 Clicks Score (PT): 17 (06/11/23 0829)    CODE STATUS:   Code Status and Medical Interventions:   Ordered at: 06/10/23 2017     Level Of Support Discussed With:    Patient     Code Status (Patient has no pulse and is not breathing):    CPR (Attempt to Resuscitate)     Medical Interventions (Patient has pulse or is breathing):    Full Support       Rose  CUCO Vasques MD  06/11/23

## 2023-06-11 NOTE — H&P
"    [facility] Medicine Services  HISTORY AND PHYSICAL    Patient Name: Thelma Michael  : 1960  MRN: [id number]  Primary Care Physician: Monet Howe APRN  Date of admission: 6/10/2023    Subjective   Subjective     Chief Complaint:  N/V    HPI:  Thelma Michael is a 62 y.o. female with a past medical history significant for poorly controlled T1DM, Afib on Xarelto, iron deficiency anemia, gastroparesis, hyperlipidemia, HTN, migraines, self-catheterizations of bladder, strokes (chronic right lentiform nucleus and left cerebellar lacunar infarcts found on MRI in 2023), tobacco abuse, and GERD. Patient is well known to this service due to multiple admissions concerning DKA, gastroparesis, and urinary retention. She presents today with complaints of difficulty urinating over the past 24 hours. States she feels like she has to urinate but \"can't get anything out\". Reports associated bladder fullness and nausea with intractable vomiting which onset a few hours prior to arrival. States she was concerned about dehydration and going into to DKA again. Here for further evaluation and treatment.  Currently there are no complaints of fever, cough, congestion, SOB, or chest pain. No diarrhea, constipation. No flank pain. Will admit to inpatient.      Review of Systems   Constitutional:  Negative for chills and fever.   HENT:  Negative for congestion and trouble swallowing.    Eyes:  Negative for photophobia and visual disturbance.   Respiratory:  Negative for cough and shortness of breath.    Cardiovascular:  Negative for chest pain and leg swelling.   Gastrointestinal:  Positive for nausea and vomiting. Negative for abdominal pain and diarrhea.   Endocrine: Negative for cold intolerance and heat intolerance.   Genitourinary:  Positive for difficulty urinating and dysuria. Negative for flank pain and frequency.   Musculoskeletal:  Negative for back pain and gait " problem.   Skin:  Negative for pallor and rash.   Allergic/Immunologic: Positive for immunocompromised state.   Neurological:  Positive for weakness. Negative for dizziness and headaches.   Hematological:  Negative for adenopathy.   Psychiatric/Behavioral:  Negative for agitation and confusion.           Personal History     Past Medical History:   Diagnosis Date   • Acid reflux    • Acute bronchitis    • Cardiac murmur    • Depression with anxiety 10/5/2020   • Diabetes mellitus    • Gastroesophageal reflux disease 5/13/2016   • H/O echocardiogram 08/07/2012    i. LVEF 65%.ii. Mild LVH.iii. Borderline evidence of atrial septal aneurysm.  No PFO.    • History of nuclear stress test 08/22/2014    Negative for ischemia and scars; LVEF 77%.     • History of TIAs 7/15/2021   • Hyperlipidemia    • Hypertension    • Impacted cerumen of both ears    • Migraine    • Migraines 10/31/2019   • Self-catheterizes urinary bladder    • Sinusitis    • Stroke    • Tobacco abuse     quit 4 days ago.     • Urticaria    • Vitamin D deficiency 5/13/2016             Past Surgical History:   Procedure Laterality Date   • CAPSULE ENDOSCOPY  07/27/2021    Procedure: PILLCAM DEPLOYMENT;  Surgeon: Mikael Worthy MD;  Location:  [facility];  Service: Gastroenterology;;   • COLONOSCOPY     • COLONOSCOPY N/A 07/27/2021    Procedure: COLONOSCOPY;  Surgeon: Mikael Worthy MD;  Location:  [facility];  Service: Gastroenterology;  Laterality: N/A;   • DENTAL PROCEDURE     • ENDOSCOPY N/A 06/30/2021    Procedure: ESOPHAGOGASTRODUODENOSCOPY;  Surgeon: Brunner, Mark I, MD;  Location:  [facility];  Service: Gastroenterology;  Laterality: N/A;   • ENDOSCOPY N/A 07/27/2021    Procedure: ESOPHAGOGASTRODUODENOSCOPY;  Surgeon: Mikael Worthy MD;  Location:  [facility];  Service: Gastroenterology;  Laterality: N/A;   • ENDOSCOPY WITH JTUBE N/A 03/16/2022    Procedure: ESOPHAGOGASTRODUODENOSCOPY WITH JEJUNAL TUBE INSERTION;   Surgeon: Thom Glover MD;  Location: [facility];  Service: Gastroenterology;  Laterality: N/A;   • UPPER GASTROINTESTINAL ENDOSCOPY         Family History:  family history includes ADD / ADHD in an other family member; Anxiety disorder in an other family member; Arthritis in an other family member; Depression in an other family member; Diabetes in her brother and another family member; Heart disease in an other family member; Hyperlipidemia in her mother and another family member; Hypertension in her brother, mother, and another family member; Lung cancer in an other family member; Osteoporosis in an other family member.     Social History:  reports that she quit smoking about 4 years ago. Her smoking use included cigarettes. She has a 7.50 pack-year smoking history. She has never used smokeless tobacco. She reports current alcohol use of about 1.0 standard drink per week. She reports current drug use. Frequency: 2.00 times per week. Drug: Marijuana.  Social History     Social History Narrative   • Not on file       Medications:  BASAGLAR KWIKPEN, acetaminophen, albuterol sulfate HFA, amLODIPine, calcium carbonate, carvedilol, diphenhydrAMINE, doxazosin, ferrous sulfate, insulin detemir, lactobacillus acidophilus, losartan, melatonin, mirtazapine, multivitamin, pantoprazole, rivaroxaban, sennosides-docusate, traMADol, and vitamin D    No Known Allergies    Objective   Objective     Vital Signs:   Temp:  [98.5 °F (36.9 °C)] 98.5 °F (36.9 °C)  Heart Rate:  [] 79  Resp:  [18] 18  BP: (176-216)/() 176/76    Physical Exam   Constitutional: Awake, alert, chronically ill appearing, underweight  Eyes: PERRLA, sclerae anicteric, no conjunctival injection  HENT: NCAT, mucous membranes moist  Neck: Supple, no thyromegaly, no lymphadenopathy, trachea midline  Respiratory: Clear to auscultation bilaterally, nonlabored respirations   Cardiovascular: RRR, no murmurs, rubs, or gallops, palpable pedal pulses  bilaterally  Gastrointestinal: Positive bowel sounds, soft, nontender, nondistended  Musculoskeletal: No bilateral ankle edema, no clubbing or cyanosis to extremities  Psychiatric: Appropriate affect, cooperative  Neurologic: Oriented x 3, strength symmetric in all extremities, Cranial Nerves grossly intact to confrontation, speech clear  Skin: No rashes      Result Review:  I have personally reviewed the results from the time of this admission to 6/10/2023 21:14 EDT and agree with these findings:  [x]  Laboratory list / accordion  []  Microbiology  []  Radiology  []  EKG/Telemetry   []  Cardiology/Vascular   []  Pathology  [x]  Old records  []  Other:  Most notable findings include: vitals stable. Sodium 129. Glucose 740. Creatinine 1.36. alk phos 140. Hemoglobin 11.9. UA clear.    LAB RESULTS:      Lab 06/10/23  1831   WBC 7.85   HEMOGLOBIN 11.9*   HEMATOCRIT 35.3   PLATELETS 194   NEUTROS ABS 6.48   IMMATURE GRANS (ABS) 0.03   LYMPHS ABS 0.94   MONOS ABS 0.36   EOS ABS 0.03   MCV 85.7         Lab 06/10/23  1831   SODIUM 129*   POTASSIUM 3.8   CHLORIDE 93*   CO2 22.0   ANION GAP 14.0   BUN 18   CREATININE 1.36*   EGFR 43.6*   GLUCOSE 740*   CALCIUM 9.5         Lab 06/10/23  1831   TOTAL PROTEIN 7.2   ALBUMIN 4.1   GLOBULIN 3.1   ALT (SGPT) 17   AST (SGOT) 23   BILIRUBIN 0.2   ALK PHOS 140*                     Brief Urine Lab Results  (Last result in the past 365 days)        Color   Clarity   Blood   Leuk Est   Nitrite   Protein   CREAT   Urine HCG        06/10/23 1725 Yellow   Clear   Trace   Negative   Negative   30 mg/dL (1+)                 Microbiology Results (last 10 days)       ** No results found for the last 240 hours. **            No radiology results from the last 24 hrs    Results for orders placed during the hospital encounter of 12/06/22    Adult Transthoracic Echo Complete W/ Cont if Necessary Per Protocol    Interpretation Summary  •  Left ventricular ejection fraction appears to be greater than  70%.  •  Left ventricular wall thickness is consistent with moderate concentric hypertrophy.  •  Left ventricular diastolic function is consistent with (grade I) impaired relaxation.  •  Estimated right ventricular systolic pressure from tricuspid regurgitation is mildly elevated (35-45 mmHg). Calculated right ventricular systolic pressure from tricuspid regurgitation is 39 mmHg.      Assessment & Plan   Assessment & Plan       Acute kidney injury    Uncontrolled type 1 diabetes mellitus with hyperglycemia    Refractory nausea and vomiting    Urinary retention    Hypertension    Heart valve disease    Gastroparesis    PFO (patent foramen ovale)    Atrial fibrillation and flutter    CKD (chronic kidney disease) stage 3, GFR 30-59 ml/min    Diabetic peripheral neuropathy    Hyperlipidemia    Iron deficiency anemia secondary to inadequate dietary iron intake    Hyperglycemia  Diabetic gastroparesis with intractable nausea and vomiting  - no ketonuria. Serum CO2 favorable. Do not suspect DKA. Improved with aggressive hydration and Sq insulin. Has dropped from 740 to 450.  - check lactic acid  - continue insulin Levemir 10 units daily  - IVF  - SSI with scheduled accu checks q 2 hours, then q 6 hours  - Check hb A1c  - diabetic educator  - Patient developed severe diabetic gastroparesis resulting in intractable nausea and vomiting and dehydration.    - still having some issues with her insulin pump. She quit using it several weeks ago and has not been injecting herself with insulin both probably contributed to her hyperglycemia  -  Zofran and Reglan as needed  - follows with  gastroparesis clinic in [place] on 4/20/2023 to adjust her gastric stimulator  - close monitor on telemetry  - am labs    RAFAT  - acute on chronic CKD III  - baseline 0.9 to 1.25, slightly elevated at 1.39  - IVF  - avoid nephrotoxins    Urinary retention  - UA stable  - straight cath X2 resulted 1700ml X2  - jo anchored with jo care  -  consider CT A/P. Holding off for now per history of retention and multiple CT A/P  - consider urology input    HTN urgency  - 2/2 not being able to take her po meds  - continue coreg to 12.5 twice daily, continue Cardura  - continue amlodipine 10 mg daily, losartan 100 mg daily terazosin      Afib  -cont coreg, xarelto     GERD  -cont pepcid w/ Hx cdiff and active Abx use       DVT prophylaxis:  Eliquis    CODE STATUS:  full code  Level Of Support Discussed With: Patient  Code Status (Patient has no pulse and is not breathing): CPR (Attempt to Resuscitate)  Medical Interventions (Patient has pulse or is breathing): Full Support      Expected Discharge  TBD      This note has been completed as part of a split-shared workflow.     Signature: Electronically signed by Anna Cisneros PA-C, 06/10/23, 9:25 PM EDT      Total time spent: 90 minutes  Time spent includes time reviewing chart, face-to-face time, counseling patient/family/caregiver, ordering medications/tests/procedures, communicating with other health care professionals, documenting clinical information in the electronic health record, and coordination of care.    Patient seen and examined at the bedside.  Patient is a 62-year-old female with past medical history significant for hypertension, hyperlipidemia, migraine headache, atrial fibrillation on Xarelto, poorly controlled diabetes mellitus type 1.  Initial evaluation at the ER today included a hemoglobin A1c which was higher than 13.  Evidently patient has not taken her insulin and her blood pressure medication at least for the past couple of days.  Patient comes to the emergency room with elevated blood sugar around 740, drowsiness, accelerated blood pressure.  Patient initially was given 8 units of regular insulin intravenously and blood sugar came down to 400s.  Denies any fever or chills.  No nausea vomiting or diarrhea during the past several days.  Constitutional: No acute distress  HENT: NCAT,  mucous membranes moist  Respiratory: Clear to auscultation bilaterally, respiratory effort normal   Cardiovascular: RRR, no murmurs, rubs, or gallops  Gastrointestinal: Positive bowel sounds, soft, nontender, nondistended  Musculoskeletal: No bilateral ankle edema, low muscle mass  Psychiatric: Appropriate affect, cooperative  Neurologic: Oriented x 3, strength symmetric in all extremities, Cranial Nerves grossly intact to confrontation, speech clear  Skin: No rashes  Assessment and plan:  Patient is a 62-year-old female with past medical history of type 1 diabetes mellitus.  Patient has not been taking insulin or her blood pressure medication for the past day or 2.  Patient came in with hyperglycemia, drowsiness, accelerated blood pressure.  Patient will be admitted on telemetry for observation.    Total time spent was 30 minutes.

## 2023-06-11 NOTE — PLAN OF CARE
Goal Outcome Evaluation:              Outcome Evaluation: Pt presents with decreased functional mobility compared to baseline. Pt would benefit from skilled PT services to improve functional independence. Recommend home with family and HHPT, pending progress.

## 2023-06-12 LAB
ANION GAP SERPL CALCULATED.3IONS-SCNC: 12 MMOL/L (ref 5–15)
BUN SERPL-MCNC: 16 MG/DL (ref 8–23)
BUN/CREAT SERPL: 11.5 (ref 7–25)
CALCIUM SPEC-SCNC: 9.4 MG/DL (ref 8.6–10.5)
CHLORIDE SERPL-SCNC: 109 MMOL/L (ref 98–107)
CO2 SERPL-SCNC: 23 MMOL/L (ref 22–29)
CREAT SERPL-MCNC: 1.39 MG/DL (ref 0.57–1)
DEPRECATED RDW RBC AUTO: 39.6 FL (ref 37–54)
EGFRCR SERPLBLD CKD-EPI 2021: 42.5 ML/MIN/1.73
ERYTHROCYTE [DISTWIDTH] IN BLOOD BY AUTOMATED COUNT: 12.8 % (ref 12.3–15.4)
GLUCOSE BLDC GLUCOMTR-MCNC: 107 MG/DL (ref 70–130)
GLUCOSE BLDC GLUCOMTR-MCNC: 131 MG/DL (ref 70–130)
GLUCOSE BLDC GLUCOMTR-MCNC: 154 MG/DL (ref 70–130)
GLUCOSE BLDC GLUCOMTR-MCNC: 165 MG/DL (ref 70–130)
GLUCOSE BLDC GLUCOMTR-MCNC: 171 MG/DL (ref 70–130)
GLUCOSE BLDC GLUCOMTR-MCNC: 171 MG/DL (ref 70–130)
GLUCOSE BLDC GLUCOMTR-MCNC: 237 MG/DL (ref 70–130)
GLUCOSE BLDC GLUCOMTR-MCNC: 264 MG/DL (ref 70–130)
GLUCOSE BLDC GLUCOMTR-MCNC: 90 MG/DL (ref 70–130)
GLUCOSE SERPL-MCNC: 111 MG/DL (ref 65–99)
HCT VFR BLD AUTO: 31.1 % (ref 34–46.6)
HGB BLD-MCNC: 10.8 G/DL (ref 12–15.9)
MCH RBC QN AUTO: 29.9 PG (ref 26.6–33)
MCHC RBC AUTO-ENTMCNC: 34.7 G/DL (ref 31.5–35.7)
MCV RBC AUTO: 86.1 FL (ref 79–97)
PLATELET # BLD AUTO: 257 10*3/MM3 (ref 140–450)
PMV BLD AUTO: 11.4 FL (ref 6–12)
POTASSIUM SERPL-SCNC: 3.3 MMOL/L (ref 3.5–5.2)
RBC # BLD AUTO: 3.61 10*6/MM3 (ref 3.77–5.28)
SODIUM SERPL-SCNC: 144 MMOL/L (ref 136–145)
WBC NRBC COR # BLD: 7.06 10*3/MM3 (ref 3.4–10.8)

## 2023-06-12 PROCEDURE — 85027 COMPLETE CBC AUTOMATED: CPT | Performed by: HOSPITALIST

## 2023-06-12 PROCEDURE — 51798 US URINE CAPACITY MEASURE: CPT

## 2023-06-12 PROCEDURE — 99232 SBSQ HOSP IP/OBS MODERATE 35: CPT | Performed by: HOSPITALIST

## 2023-06-12 PROCEDURE — 80048 BASIC METABOLIC PNL TOTAL CA: CPT | Performed by: HOSPITALIST

## 2023-06-12 PROCEDURE — 82948 REAGENT STRIP/BLOOD GLUCOSE: CPT

## 2023-06-12 PROCEDURE — G0378 HOSPITAL OBSERVATION PER HR: HCPCS

## 2023-06-12 PROCEDURE — 63710000001 INSULIN LISPRO (HUMAN) PER 5 UNITS: Performed by: HOSPITALIST

## 2023-06-12 PROCEDURE — 63710000001 INSULIN DETEMIR PER 5 UNITS: Performed by: PHYSICIAN ASSISTANT

## 2023-06-12 RX ORDER — INSULIN LISPRO 100 [IU]/ML
2-7 INJECTION, SOLUTION INTRAVENOUS; SUBCUTANEOUS
Status: DISCONTINUED | OUTPATIENT
Start: 2023-06-12 | End: 2023-06-15 | Stop reason: HOSPADM

## 2023-06-12 RX ORDER — POTASSIUM CHLORIDE 750 MG/1
40 CAPSULE, EXTENDED RELEASE ORAL ONCE
Status: COMPLETED | OUTPATIENT
Start: 2023-06-12 | End: 2023-06-12

## 2023-06-12 RX ADMIN — RIVAROXABAN 15 MG: 15 TABLET, FILM COATED ORAL at 17:10

## 2023-06-12 RX ADMIN — PANTOPRAZOLE SODIUM 40 MG: 40 TABLET, DELAYED RELEASE ORAL at 08:15

## 2023-06-12 RX ADMIN — Medication 1 CAPSULE: at 08:13

## 2023-06-12 RX ADMIN — SENNOSIDES AND DOCUSATE SODIUM 2 TABLET: 50; 8.6 TABLET ORAL at 08:13

## 2023-06-12 RX ADMIN — Medication 10 ML: at 20:00

## 2023-06-12 RX ADMIN — INSULIN LISPRO 2 UNITS: 100 INJECTION, SOLUTION INTRAVENOUS; SUBCUTANEOUS at 17:10

## 2023-06-12 RX ADMIN — CARVEDILOL 12.5 MG: 12.5 TABLET, FILM COATED ORAL at 08:17

## 2023-06-12 RX ADMIN — TERAZOSIN HYDROCHLORIDE 2 MG: 2 CAPSULE ORAL at 19:57

## 2023-06-12 RX ADMIN — INSULIN DETEMIR 10 UNITS: 100 INJECTION, SOLUTION SUBCUTANEOUS at 19:54

## 2023-06-12 RX ADMIN — MIRTAZAPINE 7.5 MG: 15 TABLET, FILM COATED ORAL at 19:57

## 2023-06-12 RX ADMIN — FERROUS SULFATE TAB 325 MG (65 MG ELEMENTAL FE) 325 MG: 325 (65 FE) TAB at 08:15

## 2023-06-12 RX ADMIN — AMLODIPINE BESYLATE 10 MG: 10 TABLET ORAL at 08:13

## 2023-06-12 RX ADMIN — Medication 5 MG: at 19:57

## 2023-06-12 RX ADMIN — TRAMADOL HYDROCHLORIDE 50 MG: 50 TABLET, COATED ORAL at 19:55

## 2023-06-12 RX ADMIN — SENNOSIDES AND DOCUSATE SODIUM 2 TABLET: 50; 8.6 TABLET ORAL at 19:57

## 2023-06-12 RX ADMIN — Medication 10 ML: at 08:18

## 2023-06-12 RX ADMIN — POTASSIUM CHLORIDE 40 MEQ: 750 CAPSULE, EXTENDED RELEASE ORAL at 12:06

## 2023-06-12 RX ADMIN — CARVEDILOL 12.5 MG: 12.5 TABLET, FILM COATED ORAL at 17:10

## 2023-06-12 NOTE — PLAN OF CARE
Problem: Adult Inpatient Plan of Care  Goal: Plan of Care Review  Outcome: Ongoing, Progressing  Goal: Patient-Specific Goal (Individualized)  Outcome: Ongoing, Progressing  Goal: Absence of Hospital-Acquired Illness or Injury  Outcome: Ongoing, Progressing  Intervention: Identify and Manage Fall Risk  Recent Flowsheet Documentation  Taken 6/11/2023 2200 by Brittny Pandey RN  Safety Promotion/Fall Prevention:   assistive device/personal items within reach   clutter free environment maintained   fall prevention program maintained   nonskid shoes/slippers when out of bed   room organization consistent   safety round/check completed  Taken 6/11/2023 2000 by Brittny Pandey RN  Safety Promotion/Fall Prevention:   assistive device/personal items within reach   clutter free environment maintained   fall prevention program maintained   nonskid shoes/slippers when out of bed   room organization consistent   safety round/check completed  Intervention: Prevent Skin Injury  Recent Flowsheet Documentation  Taken 6/11/2023 2200 by Brittny Pandey RN  Body Position: position changed independently  Skin Protection:   adhesive use limited   tubing/devices free from skin contact  Taken 6/11/2023 2000 by Brittny Pandey RN  Body Position: position changed independently  Skin Protection:   adhesive use limited   tubing/devices free from skin contact  Intervention: Prevent and Manage VTE (Venous Thromboembolism) Risk  Recent Flowsheet Documentation  Taken 6/11/2023 2200 by Brittny Pandey RN  Activity Management: bedrest  Taken 6/11/2023 2000 by Brittny Pandey RN  Activity Management: bedrest  Goal: Optimal Comfort and Wellbeing  Outcome: Ongoing, Progressing  Intervention: Provide Person-Centered Care  Recent Flowsheet Documentation  Taken 6/11/2023 2000 by Brittny Pandey RN  Trust Relationship/Rapport:   care explained   choices provided  Goal: Readiness for Transition of Care  Outcome: Ongoing, Progressing     Problem: Fall Injury  Risk  Goal: Absence of Fall and Fall-Related Injury  Outcome: Ongoing, Progressing  Intervention: Identify and Manage Contributors  Recent Flowsheet Documentation  Taken 6/11/2023 2000 by Brittny Pandey RN  Medication Review/Management: medications reviewed  Intervention: Promote Injury-Free Environment  Recent Flowsheet Documentation  Taken 6/11/2023 2200 by Brittny Pandey RN  Safety Promotion/Fall Prevention:   assistive device/personal items within reach   clutter free environment maintained   fall prevention program maintained   nonskid shoes/slippers when out of bed   room organization consistent   safety round/check completed  Taken 6/11/2023 2000 by Brittny Pandey RN  Safety Promotion/Fall Prevention:   assistive device/personal items within reach   clutter free environment maintained   fall prevention program maintained   nonskid shoes/slippers when out of bed   room organization consistent   safety round/check completed     Problem: Hypertension Comorbidity  Goal: Blood Pressure in Desired Range  Outcome: Ongoing, Progressing  Intervention: Maintain Blood Pressure Management  Recent Flowsheet Documentation  Taken 6/11/2023 2000 by Brittny Pandey RN  Medication Review/Management: medications reviewed   Goal Outcome Evaluation:

## 2023-06-12 NOTE — PROGRESS NOTES
[facility] Medicine Services  PROGRESS NOTE    Patient Name: Thelma Michael  : 1960  MRN: [id number]    Date of Admission: 6/10/2023  Primary Care Physician: Monet Howe APRN    Subjective   Subjective     CC:  F/U nausea, vomiting    HPI: Tired/sleepy this AM. Ready for catheter out. NO f/c.    ROS:  Gen-no fevers, no chills  CV-no chest pain, no palpitations  Resp-no cough, no dyspnea  GI-improved nausea, no abd pain    Objective   Objective     Vital Signs:   Temp:  [98.6 °F (37 °C)-99.2 °F (37.3 °C)] 99.2 °F (37.3 °C)  Heart Rate:  [60-83] 61  Resp:  [16-18] 18  BP: (102-156)/(51-80) 114/58     Physical Exam:  NAD, alert and oriented  OP clear, MMM  Neck supple  No LAD  RRR  CTAB  +BS, ND, NT, soft  MARCUM  Normal affect    Results Reviewed:  LAB RESULTS:      Lab 23  0602 23  0148 06/10/23  1831   WBC 7.06 8.40  --  7.85   HEMOGLOBIN 10.8* 11.2*  --  11.9*   HEMATOCRIT 31.1* 31.7*  --  35.3   PLATELETS 257 291  --  194   NEUTROS ABS  --   --   --  6.48   IMMATURE GRANS (ABS)  --   --   --  0.03   LYMPHS ABS  --   --   --  0.94   MONOS ABS  --   --   --  0.36   EOS ABS  --   --   --  0.03   MCV 86.1 85.7  --  85.7   LACTATE  --   --  1.5  --          Lab 23  0148 06/10/23  1831   SODIUM 144 144 129*   POTASSIUM 3.3* 3.0* 3.8   CHLORIDE 109* 108* 93*   CO2 23.0 24.0 22.0   ANION GAP 12.0 12.0 14.0   BUN 16 17 18   CREATININE 1.39* 1.15* 1.36*   EGFR 42.5* 53.3* 43.6*   GLUCOSE 111* 211* 740*   CALCIUM 9.4 9.1 9.5   MAGNESIUM  --  1.9  --    PHOSPHORUS  --  2.7  --    HEMOGLOBIN A1C  --   --  13.10*         Lab 06/10/23  1831   TOTAL PROTEIN 7.2   ALBUMIN 4.1   GLOBULIN 3.1   ALT (SGPT) 17   AST (SGOT) 23   BILIRUBIN 0.2   ALK PHOS 140*                     Brief Urine Lab Results  (Last result in the past 365 days)        Color   Clarity   Blood   Leuk Est   Nitrite   Protein   CREAT   Urine HCG        06/10/23  1725 Yellow   Clear   Trace   Negative   Negative   30 mg/dL (1+)                   Microbiology Results Abnormal       None            No radiology results from the last 24 hrs    Results for orders placed during the hospital encounter of 12/06/22    Adult Transthoracic Echo Complete W/ Cont if Necessary Per Protocol    Interpretation Summary    Left ventricular ejection fraction appears to be greater than 70%.    Left ventricular wall thickness is consistent with moderate concentric hypertrophy.    Left ventricular diastolic function is consistent with (grade I) impaired relaxation.    Estimated right ventricular systolic pressure from tricuspid regurgitation is mildly elevated (35-45 mmHg). Calculated right ventricular systolic pressure from tricuspid regurgitation is 39 mmHg.      Current medications:  Scheduled Meds:amLODIPine, 10 mg, Oral, Q24H  carvedilol, 12.5 mg, Oral, BID With Meals  ferrous sulfate, 325 mg, Oral, Daily With Breakfast  insulin detemir, 10 Units, Subcutaneous, Nightly  insulin lispro, 2-7 Units, Subcutaneous, 4x Daily AC & at Bedtime  lactobacillus acidophilus, 1 capsule, Oral, Daily  losartan, 100 mg, Oral, Q24H  mirtazapine, 7.5 mg, Oral, Nightly  pantoprazole, 40 mg, Oral, Daily  rivaroxaban, 20 mg, Oral, Daily With Dinner  senna-docusate sodium, 2 tablet, Oral, BID  sodium chloride, 10 mL, Intravenous, Q12H  terazosin, 2 mg, Oral, Nightly      Continuous Infusions:   PRN Meds:.  acetaminophen    albuterol    senna-docusate sodium **AND** polyethylene glycol **AND** bisacodyl **AND** bisacodyl    dextrose    dextrose    glucagon (human recombinant)    melatonin    sodium chloride    sodium chloride    traMADol    Assessment & Plan   Assessment & Plan     Active Hospital Problems    Diagnosis  POA    Urinary retention [R33.9]  Yes    CKD (chronic kidney disease) stage 3, GFR 30-59 ml/min [N18.30]  Yes    Refractory nausea and vomiting [R11.2]  Yes    PFO (patent foramen ovale) [Q21.12]   Not Applicable    Atrial fibrillation and flutter [I48.91, I48.92]  Yes    Gastroparesis [K31.84]  Yes    Uncontrolled type 1 diabetes mellitus with hyperglycemia [E10.65]  Yes    Acute kidney injury [N17.9]  Yes    Iron deficiency anemia secondary to inadequate dietary iron intake [D50.8]  Yes    Heart valve disease [I38]  Yes    Hypertension [I10]  Yes    Hyperlipidemia [E78.5]  Yes    Diabetic peripheral neuropathy [E11.42]  Yes      Resolved Hospital Problems   No resolved problems to display.        Brief Hospital Course to date:  Thelma Michael is a 62 y.o. female with past medical history significant for poorly controlled type 1 diabetes, Afib on Xarelto, iron deficiency anemia, gastroparesis s/p gastric stimulator, HTN, HLD, migraines, chronic urinary retention (previously performed self-catheterization), strokes (chronic right lentiform nucleus and left cerebellar lacunar infarcts found on MRI in February of 2023), tobacco abuse, and GERD. Patient is well known to this service due to multiple admissions concerning DKA, gastroparesis, and urinary retention. She presents with complaints of difficulty urinating over the past 24 hours. Associated nausea and vomiting. Glucose 740 on arrival.      Hyperglycemia  Diabetic gastroparesis with intractable nausea and vomiting  Type 1 diabetes mellitus, uncontrolled, HbA1c 13.1%  -improved  -s/p IVF  -monitor PO intake  -basal/bolus, may need new Rx at follow up   -has endocrine f/u with Dr. Ochoa 7/3  -zofran/reglan  -followed by gastroparesis clinic in [facility] stimulator     CKD 3  Elevated Cr  -better after IVF  - Baseline Cr ~0.9-1.25, slightly elevated at 1.36 on admission  - Improved with IV fluids  - Avoid nephrotoxins     Urinary retention  -hx of neurogenic bladder  -self cath okay per urology     HTN urgency  -Continue Coreg, Cardura, Amlodipine, Losartan    Afib  -Continue Coreg, Xarelto     GERD  -Continue Pepcid with hx of C.diff        Expected Discharge Location and Transportation: home  Expected Discharge anticipate could be discharged home as early as tomorrow  Expected Discharge Date: 6/13/2023; Expected Discharge Time:      DVT prophylaxis:  Medical DVT prophylaxis orders are present.     AM-PAC 6 Clicks Score (PT): 20 (06/11/23 1500)    CODE STATUS:   Code Status and Medical Interventions:   Ordered at: 06/10/23 2017     Level Of Support Discussed With:    Patient     Code Status (Patient has no pulse and is not breathing):    CPR (Attempt to Resuscitate)     Medical Interventions (Patient has pulse or is breathing):    Full Support       Krishna Jaen MD  06/12/23

## 2023-06-12 NOTE — CASE MANAGEMENT/SOCIAL WORK
Discharge Planning Assessment  [facility]     Patient Name: Thelma Michael  MRN: [id number]  Today's Date: 6/12/2023    Admit Date: 6/10/2023    Plan: HOME   Discharge Needs Assessment       Row Name 06/12/23 1547       Living Environment    People in Home child(bronson), adult    Current Living Arrangements home    Primary Care Provided by self    Provides Primary Care For no one    Family Caregiver if Needed child(bronson), adult    Quality of Family Relationships helpful;involved;supportive    Able to Return to Prior Arrangements yes       Transition Planning    Patient/Family Anticipates Transition to home with family    Patient/Family Anticipated Services at Transition     Transportation Anticipated family or friend will provide       Discharge Needs Assessment    Equipment Currently Used at Home walker, rolling    Concerns to be Addressed no discharge needs identified;denies needs/concerns at this time;discharge planning    Anticipated Changes Related to Illness none    Equipment Needed After Discharge none    Current Discharge Risk chronically ill                   Discharge Plan       Row Name 06/12/23 1547       Plan    Plan HOME    Patient/Family in Agreement with Plan yes    Plan Comments Met with pt at bedside for DCP.  She resides with her son in [facility].  She is independent with ADLs.  Uses a RW at baseline.  No current HH services.  Confirmed she has [facility] Medicaid with Rx coverage.  Goal is to return home upon DC.  No immediate needs identified/voiced.  CM will cont to follow.    Final Discharge Disposition Code 01 - home or self-care                  Continued Care and Services - Admitted Since 6/10/2023    Coordination has not been started for this encounter.       Expected Discharge Date and Time       Expected Discharge Date Expected Discharge Time    Jun 13, 2023            Demographic Summary       Row Name 06/12/23 1546       General Information    Admission Type observation     Referral Source admission list    Reason for Consult discharge planning    Preferred Language English       Contact Information    Permission Granted to Share Info With     Contact Information Obtained for                    Functional Status       Row Name 06/12/23 1547       Functional Status    Usual Activity Tolerance good    Current Activity Tolerance good       Functional Status, IADL    Medications independent    Meal Preparation independent    Housekeeping independent    Laundry independent    Shopping independent                   Psychosocial    No documentation.                  Abuse/Neglect    No documentation.                  Legal    No documentation.                  Substance Abuse    No documentation.                  Patient Forms    No documentation.                     Margaret Gallegos RN

## 2023-06-13 ENCOUNTER — APPOINTMENT (OUTPATIENT)
Dept: GENERAL RADIOLOGY | Facility: HOSPITAL | Age: 63
End: 2023-06-13
Payer: COMMERCIAL

## 2023-06-13 LAB
GLUCOSE BLDC GLUCOMTR-MCNC: 155 MG/DL (ref 70–130)
GLUCOSE BLDC GLUCOMTR-MCNC: 195 MG/DL (ref 70–130)
GLUCOSE BLDC GLUCOMTR-MCNC: 356 MG/DL (ref 70–130)
GLUCOSE BLDC GLUCOMTR-MCNC: 390 MG/DL (ref 70–130)
GLUCOSE BLDC GLUCOMTR-MCNC: 82 MG/DL (ref 70–130)

## 2023-06-13 PROCEDURE — 73090 X-RAY EXAM OF FOREARM: CPT

## 2023-06-13 PROCEDURE — 51798 US URINE CAPACITY MEASURE: CPT

## 2023-06-13 PROCEDURE — G0378 HOSPITAL OBSERVATION PER HR: HCPCS

## 2023-06-13 PROCEDURE — 82948 REAGENT STRIP/BLOOD GLUCOSE: CPT

## 2023-06-13 PROCEDURE — 63710000001 INSULIN DETEMIR PER 5 UNITS: Performed by: PHYSICIAN ASSISTANT

## 2023-06-13 PROCEDURE — 73560 X-RAY EXAM OF KNEE 1 OR 2: CPT

## 2023-06-13 PROCEDURE — 63710000001 INSULIN LISPRO (HUMAN) PER 5 UNITS: Performed by: HOSPITALIST

## 2023-06-13 PROCEDURE — 99239 HOSP IP/OBS DSCHRG MGMT >30: CPT | Performed by: HOSPITALIST

## 2023-06-13 PROCEDURE — 73080 X-RAY EXAM OF ELBOW: CPT

## 2023-06-13 PROCEDURE — 51701 INSERT BLADDER CATHETER: CPT

## 2023-06-13 RX ORDER — DOXAZOSIN 2 MG/1
1 TABLET ORAL NIGHTLY
Start: 2023-06-13 | End: 2023-06-15 | Stop reason: HOSPADM

## 2023-06-13 RX ORDER — INSULIN LISPRO 100 [IU]/ML
2-7 INJECTION, SOLUTION INTRAVENOUS; SUBCUTANEOUS
Qty: 10 ML | Refills: 12 | Status: SHIPPED | OUTPATIENT
Start: 2023-06-13

## 2023-06-13 RX ADMIN — LOSARTAN POTASSIUM 100 MG: 50 TABLET, FILM COATED ORAL at 08:11

## 2023-06-13 RX ADMIN — RIVAROXABAN 15 MG: 15 TABLET, FILM COATED ORAL at 17:10

## 2023-06-13 RX ADMIN — INSULIN LISPRO 6 UNITS: 100 INJECTION, SOLUTION INTRAVENOUS; SUBCUTANEOUS at 20:55

## 2023-06-13 RX ADMIN — Medication 5 MG: at 20:56

## 2023-06-13 RX ADMIN — CARVEDILOL 12.5 MG: 12.5 TABLET, FILM COATED ORAL at 08:11

## 2023-06-13 RX ADMIN — INSULIN LISPRO 6 UNITS: 100 INJECTION, SOLUTION INTRAVENOUS; SUBCUTANEOUS at 11:43

## 2023-06-13 RX ADMIN — Medication 10 ML: at 08:15

## 2023-06-13 RX ADMIN — Medication 10 ML: at 21:00

## 2023-06-13 RX ADMIN — PANTOPRAZOLE SODIUM 40 MG: 40 TABLET, DELAYED RELEASE ORAL at 08:11

## 2023-06-13 RX ADMIN — INSULIN DETEMIR 10 UNITS: 100 INJECTION, SOLUTION SUBCUTANEOUS at 20:59

## 2023-06-13 RX ADMIN — TRAMADOL HYDROCHLORIDE 50 MG: 50 TABLET, COATED ORAL at 20:56

## 2023-06-13 RX ADMIN — FERROUS SULFATE TAB 325 MG (65 MG ELEMENTAL FE) 325 MG: 325 (65 FE) TAB at 08:11

## 2023-06-13 RX ADMIN — MIRTAZAPINE 7.5 MG: 15 TABLET, FILM COATED ORAL at 20:56

## 2023-06-13 RX ADMIN — SENNOSIDES AND DOCUSATE SODIUM 2 TABLET: 50; 8.6 TABLET ORAL at 08:11

## 2023-06-13 RX ADMIN — SENNOSIDES AND DOCUSATE SODIUM 2 TABLET: 50; 8.6 TABLET ORAL at 20:56

## 2023-06-13 RX ADMIN — CARVEDILOL 12.5 MG: 12.5 TABLET, FILM COATED ORAL at 17:10

## 2023-06-13 RX ADMIN — TERAZOSIN HYDROCHLORIDE 2 MG: 2 CAPSULE ORAL at 20:56

## 2023-06-13 RX ADMIN — AMLODIPINE BESYLATE 10 MG: 10 TABLET ORAL at 08:11

## 2023-06-13 RX ADMIN — INSULIN LISPRO 2 UNITS: 100 INJECTION, SOLUTION INTRAVENOUS; SUBCUTANEOUS at 17:11

## 2023-06-13 RX ADMIN — Medication 1 CAPSULE: at 08:11

## 2023-06-13 NOTE — PROGRESS NOTES
Nutrition Services    Patient Name:  Thelma Michael  YOB: 1960  MRN: [id number]  Admit Date:  6/10/2023    Pt identified at nutrition risk due to NPO/CLD x 3d. EMR reviewed. RD to follow per protocol.     Electronically signed by:  Cait Carrasquillo RD  06/13/23 07:17 EDT

## 2023-06-13 NOTE — SIGNIFICANT NOTE
Patient pulled cord in bathroom for assistance getting up but did not wait for assistance to arrive before standing. When PCT arrived patient was sitting on floor in front of toilet and stated she had fallen. Patient stated her left elbow, arm, and knee were sore. MD notified. Xrays ordered. Discharge cancelled.

## 2023-06-13 NOTE — PLAN OF CARE
Problem: Fall Injury Risk  Goal: Absence of Fall and Fall-Related Injury  Outcome: Ongoing, Progressing  Intervention: Identify and Manage Contributors  Recent Flowsheet Documentation  Taken 6/12/2023 2000 by Tika Johnson RN  Medication Review/Management: medications reviewed  Intervention: Promote Injury-Free Environment  Recent Flowsheet Documentation  Taken 6/12/2023 2000 by Tika Johnson RN  Safety Promotion/Fall Prevention:   safety round/check completed   room organization consistent   nonskid shoes/slippers when out of bed   fall prevention program maintained     Problem: Hypertension Comorbidity  Goal: Blood Pressure in Desired Range  Outcome: Ongoing, Progressing  Intervention: Maintain Blood Pressure Management  Recent Flowsheet Documentation  Taken 6/12/2023 2000 by Tika Johnson RN  Medication Review/Management: medications reviewed   Goal Outcome Evaluation:  Plan of Care Reviewed With: patient        Progress: no change  Outcome Evaluation: Pt rested overnight, patient did state that she was hungry and wanted to advance her diet, and complained of nerve pain in legs and feet. Called Hospitalist on call made aware of patient request for diet change and that patient states she takes gabapentin when needed at home. Also requested to have her q2 BS checked changed to ACHS, order placed for BS and nurse made aware other patient request will be addressed during rounds in the morning. Nurse made patient aware. Q2 rounding completed patient resting during each round, wheels locked call light in reach.

## 2023-06-13 NOTE — DISCHARGE SUMMARY
[facility] Medicine Services  DISCHARGE SUMMARY    Patient Name: Thelma Michael  : 1960  MRN: [id number]    Date of Admission: 6/10/2023  5:01 PM  Date of Discharge:  2023  Primary Care Physician: Monet Howe APRN    Consults       Date and Time Order Name Status Description    2023 11:12 AM Inpatient Urology Consult Completed             Hospital Course     Presenting Problem: Hyperglycemia    Active Hospital Problems    Diagnosis  POA    Urinary retention [R33.9]  Yes    CKD (chronic kidney disease) stage 3, GFR 30-59 ml/min [N18.30]  Yes    Refractory nausea and vomiting [R11.2]  Yes    PFO (patent foramen ovale) [Q21.12]  Not Applicable    Atrial fibrillation and flutter [I48.91, I48.92]  Yes    Gastroparesis [K31.84]  Yes    Uncontrolled type 1 diabetes mellitus with hyperglycemia [E10.65]  Yes    Acute kidney injury [N17.9]  Yes    Iron deficiency anemia secondary to inadequate dietary iron intake [D50.8]  Yes    Heart valve disease [I38]  Yes    Hypertension [I10]  Yes    Hyperlipidemia [E78.5]  Yes    Diabetic peripheral neuropathy [E11.42]  Yes      Resolved Hospital Problems   No resolved problems to display.          Hospital Course:  Thelma Michael is a 62 y.o. female with past medical history significant for poorly controlled type 1 diabetes, Afib on Xarelto, iron deficiency anemia, gastroparesis s/p gastric stimulator, HTN, HLD, migraines, chronic urinary retention (previously performed self-catheterization), strokes (chronic right lentiform nucleus and left cerebellar lacunar infarcts found on MRI in 2023), tobacco abuse, and GERD. Patient is well known to this service due to multiple admissions concerning DKA, gastroparesis, and urinary retention. She presents with complaints of difficulty urinating over the past 24 hours. Associated nausea and vomiting. Glucose 740 on arrival.        Hyperglycemia  Diabetic  gastroparesis with intractable nausea and vomiting  Type 1 diabetes mellitus, uncontrolled, HbA1c 13.1%  -improved  -s/p IVF  -monitor PO intake  -basal/bolus, prescribed levemir and SSI at [place]  -has endocrine f/u with Dr. Ochoa 7/3  -amelia/reglan  -followed by gastroparesis clinic in [place], has stimulator, can follow up with their clinic     CKD 3  Elevated Cr  -better after IVF  - Baseline Cr ~0.9-1.25, slightly elevated at 1.36 on admission  - Improved with IV fluids  - Avoid nephrotoxins     Urinary retention  -hx of neurogenic bladder  -self cath okay per urology, resuming at home     HTN urgency  -Continue Coreg, Cardura, Amlodipine, Losartan     Afib  -Continue Coreg, Xarelto, dose adjusted     GERD  -Continue Pepcid with hx of C.diff  Discharge Follow Up Recommendations for outpatient labs/diagnostics:   As written    Day of Discharge     HPI:   No issues.     Review of Systems  Tolerating PO.    Vital Signs:   Temp:  [97.4 °F (36.3 °C)-99.2 °F (37.3 °C)] 98.2 °F (36.8 °C)  Heart Rate:  [56-77] 62  Resp:  [18] 18  BP: (117-137)/(61-79) 134/69      Physical Exam:  NAD, alert and oriented  OP clear, dry MM  Neck supple  No LAD  RRR  CTAB  +BS, ND, NT, soft  MARCUM  Normal affect    Pertinent  and/or Most Recent Results     LAB RESULTS:      Lab 06/12/23 0319 06/11/23  0602 06/11/23  0148 06/10/23  1831   WBC 7.06 8.40  --  7.85   HEMOGLOBIN 10.8* 11.2*  --  11.9*   HEMATOCRIT 31.1* 31.7*  --  35.3   PLATELETS 257 291  --  194   NEUTROS ABS  --   --   --  6.48   IMMATURE GRANS (ABS)  --   --   --  0.03   LYMPHS ABS  --   --   --  0.94   MONOS ABS  --   --   --  0.36   EOS ABS  --   --   --  0.03   MCV 86.1 85.7  --  85.7   LACTATE  --   --  1.5  --          Lab 06/12/23 0319 06/11/23  0148 06/10/23  1831   SODIUM 144 144 129*   POTASSIUM 3.3* 3.0* 3.8   CHLORIDE 109* 108* 93*   CO2 23.0 24.0 22.0   ANION GAP 12.0 12.0 14.0   BUN 16 17 18   CREATININE 1.39* 1.15* 1.36*   EGFR 42.5* 53.3* 43.6*   GLUCOSE 111*  211* 740*   CALCIUM 9.4 9.1 9.5   MAGNESIUM  --  1.9  --    PHOSPHORUS  --  2.7  --    HEMOGLOBIN A1C  --   --  13.10*         Lab 06/10/23  1831   TOTAL PROTEIN 7.2   ALBUMIN 4.1   GLOBULIN 3.1   ALT (SGPT) 17   AST (SGOT) 23   BILIRUBIN 0.2   ALK PHOS 140*                     Brief Urine Lab Results  (Last result in the past 365 days)        Color   Clarity   Blood   Leuk Est   Nitrite   Protein   CREAT   Urine HCG        06/10/23 1725 Yellow   Clear   Trace   Negative   Negative   30 mg/dL (1+)                 Microbiology Results (last 10 days)       ** No results found for the last 240 hours. **            No radiology results for the last 10 days    Results for orders placed during the hospital encounter of 04/30/23    Duplex Renal Artery - Bilateral Complete CAR    Interpretation Summary  DUPLEX RENAL ARTERY BILATERAL COMPLETE CAR    Date of Exam: 5/5/2023 9:25 AM EDT    Indication: essential hypertension.    Comparison: No comparisons available.    Technique: Grayscale, color-flow, Doppler spectral waveform analysis was performed of the kidneys, renal arteries, and aorta.      Findings:  Kidneys are normal in size, 11.4 cm in length on the right and 10.1 cm in length on the left. Renal arteries are generally well visualized. Maximal peak systolic velocity of the right renal artery in the upper range of normal at 181 cm/s at the right  renal origin. Maximal peak systolic velocities in the left renal artery are within normal limits, up to 160 cm/s in the left renal origin. Renal aortic ratios are normal, 2.0 on the right and 1.4 on the left. Resistive indices are within within normal  limits except for mildly elevated mid right renal artery resistive index of 0.79, as an isolated finding, only questionable for underlying renal parenchymal disease.    ,    Impression  Impression:  Negative bilateral duplex renal artery ultrasound. No evidence of hemodynamically significant renal artery  stenosis.        Electronically Signed: Krishna Scott  5/5/2023 5:37 PM EDT  Workstation ID: [id number]      Results for orders placed during the hospital encounter of 04/30/23    Duplex Renal Artery - Bilateral Complete CAR    Interpretation Summary  DUPLEX RENAL ARTERY BILATERAL COMPLETE CAR    Date of Exam: 5/5/2023 9:25 AM EDT    Indication: essential hypertension.    Comparison: No comparisons available.    Technique: Grayscale, color-flow, Doppler spectral waveform analysis was performed of the kidneys, renal arteries, and aorta.      Findings:  Kidneys are normal in size, 11.4 cm in length on the right and 10.1 cm in length on the left. Renal arteries are generally well visualized. Maximal peak systolic velocity of the right renal artery in the upper range of normal at 181 cm/s at the right  renal origin. Maximal peak systolic velocities in the left renal artery are within normal limits, up to 160 cm/s in the left renal origin. Renal aortic ratios are normal, 2.0 on the right and 1.4 on the left. Resistive indices are within within normal  limits except for mildly elevated mid right renal artery resistive index of 0.79, as an isolated finding, only questionable for underlying renal parenchymal disease.    ,    Impression  Impression:  Negative bilateral duplex renal artery ultrasound. No evidence of hemodynamically significant renal artery stenosis.        Electronically Signed: Krishna Scott  5/5/2023 5:37 PM EDT  Workstation ID: [id number]      Results for orders placed during the hospital encounter of 12/06/22    Adult Transthoracic Echo Complete W/ Cont if Necessary Per Protocol    Interpretation Summary    Left ventricular ejection fraction appears to be greater than 70%.    Left ventricular wall thickness is consistent with moderate concentric hypertrophy.    Left ventricular diastolic function is consistent with (grade I) impaired relaxation.    Estimated right ventricular systolic pressure from tricuspid  regurgitation is mildly elevated (35-45 mmHg). Calculated right ventricular systolic pressure from tricuspid regurgitation is 39 mmHg.      Plan for Follow-up of Pending Labs/Results:     Discharge Details        Discharge Medications        New Medications        Instructions Start Date   Insulin Lispro 100 UNIT/ML injection  Commonly known as: humaLOG   2-7 Units, Subcutaneous, 4 Times Daily Before Meals & Nightly             Changes to Medications        Instructions Start Date   acetaminophen 325 MG tablet  Commonly known as: TYLENOL  What changed:   reasons to take this  additional instructions   650 mg, Oral, Every 4 Hours PRN      albuterol sulfate  (90 Base) MCG/ACT inhaler  Commonly known as: PROVENTIL HFA;VENTOLIN HFA;PROAIR HFA  What changed: reasons to take this   2 puffs, Inhalation, Every 4 Hours PRN      calcium carbonate 500 MG chewable tablet  Commonly known as: TUMS  What changed: additional instructions   2 tablets, Oral, 3 Times Daily PRN      diphenhydrAMINE 25 MG tablet  Commonly known as: BENADRYL  What changed: additional instructions   25 mg, Oral, Every 6 Hours PRN      ferrous sulfate 325 (65 Fe) MG tablet  What changed: See the new instructions.   TAKE 1 TABLET BY MOUTH ONCE DAILY WITH  BREAKFAST  **TAKE  WITH  ORANGE  JUICE  OR  VITAMIN  C**      insulin detemir 100 UNIT/ML injection  Commonly known as: LEVEMIR  What changed: when to take this   10 Units, Subcutaneous, Daily      melatonin 5 MG tablet tablet  What changed:   reasons to take this  additional instructions   5 mg, Oral, Nightly PRN      Multivitamin tablet tablet  Generic drug: multivitamin  What changed: additional instructions   Take 1 tablet by mouth once daily      rivaroxaban 15 MG tablet  Commonly known as: XARELTO  What changed:   medication strength  how much to take   15 mg, Oral, Daily With Dinner      vitamin D 1.25 MG (29242 UT) capsule capsule  Commonly known as: ERGOCALCIFEROL  What changed: additional  instructions   50,000 Units, Oral, Weekly             Continue These Medications        Instructions Start Date   amLODIPine 10 MG tablet  Commonly known as: NORVASC   10 mg, Oral, Every 24 Hours Scheduled      carvedilol 12.5 MG tablet  Commonly known as: COREG   12.5 mg, Oral, 2 Times Daily With Meals      doxazosin 2 MG tablet  Commonly known as: CARDURA   1 mg, Oral, Nightly      lactobacillus acidophilus capsule capsule   1 capsule, Oral, Daily      losartan 100 MG tablet  Commonly known as: COZAAR   100 mg, Oral, Every 24 Hours Scheduled      mirtazapine 7.5 MG tablet  Commonly known as: REMERON   7.5 mg, Oral, Nightly      pantoprazole 40 MG EC tablet  Commonly known as: PROTONIX   40 mg, Oral, Daily      sennosides-docusate 8.6-50 MG per tablet  Commonly known as: PERICOLACE   2 tablets, Oral, 2 Times Daily PRN      traMADol 50 MG tablet  Commonly known as: ULTRAM   50 mg, Oral, Every 6 Hours PRN               No Known Allergies      Discharge Disposition:  Home or Self Care    Diet:  Hospital:  Diet Order   Procedures    Diet: Diabetic Diets, Cardiac Diets; Healthy Heart (2-3 Na+); Consistent Carbohydrate; Texture: Regular Texture (IDDSI 7); Fluid Consistency: Thin (IDDSI 0)       Activity:      Restrictions or Other Recommendations:         CODE STATUS:    Code Status and Medical Interventions:   Ordered at: 06/10/23 2017     Level Of Support Discussed With:    Patient     Code Status (Patient has no pulse and is not breathing):    CPR (Attempt to Resuscitate)     Medical Interventions (Patient has pulse or is breathing):    Full Support       Future Appointments   Date Time Provider Department Center   7/3/2023  3:30 PM Gerson Ochoa MD [facility]       Additional Instructions for the Follow-ups that You Need to Schedule       Discharge Follow-up with PCP   As directed       Currently Documented PCP:    Monet Howe APRN    PCP Phone Number:    [phone]     Follow Up  Details: 1 week         Discharge Follow-up with Specified Provider: Endocrine 7/3 as scheduled   As directed      To: Endocrine 7/3 as scheduled                       Krishna Jean MD  06/13/23      Time Spent on Discharge:  I spent  32  minutes on this discharge activity which included: face-to-face encounter with the patient, reviewing the data in the system, coordination of the care with the nursing staff as well as consultants, documentation, and entering orders.

## 2023-06-14 LAB
GLUCOSE BLDC GLUCOMTR-MCNC: 161 MG/DL (ref 70–130)
GLUCOSE BLDC GLUCOMTR-MCNC: 180 MG/DL (ref 70–130)
GLUCOSE BLDC GLUCOMTR-MCNC: 245 MG/DL (ref 70–130)
GLUCOSE BLDC GLUCOMTR-MCNC: 260 MG/DL (ref 70–130)

## 2023-06-14 PROCEDURE — 63710000001 INSULIN LISPRO (HUMAN) PER 5 UNITS: Performed by: HOSPITALIST

## 2023-06-14 PROCEDURE — 99232 SBSQ HOSP IP/OBS MODERATE 35: CPT | Performed by: HOSPITALIST

## 2023-06-14 PROCEDURE — 82948 REAGENT STRIP/BLOOD GLUCOSE: CPT

## 2023-06-14 PROCEDURE — 63710000001 INSULIN DETEMIR PER 5 UNITS: Performed by: PHYSICIAN ASSISTANT

## 2023-06-14 PROCEDURE — G0378 HOSPITAL OBSERVATION PER HR: HCPCS

## 2023-06-14 RX ORDER — CARVEDILOL 6.25 MG/1
6.25 TABLET ORAL 2 TIMES DAILY WITH MEALS
Status: DISCONTINUED | OUTPATIENT
Start: 2023-06-14 | End: 2023-06-15 | Stop reason: HOSPADM

## 2023-06-14 RX ORDER — LOSARTAN POTASSIUM 50 MG/1
50 TABLET ORAL
Status: DISCONTINUED | OUTPATIENT
Start: 2023-06-15 | End: 2023-06-15 | Stop reason: HOSPADM

## 2023-06-14 RX ORDER — TERAZOSIN 1 MG/1
1 CAPSULE ORAL NIGHTLY
Status: DISCONTINUED | OUTPATIENT
Start: 2023-06-14 | End: 2023-06-15 | Stop reason: HOSPADM

## 2023-06-14 RX ORDER — SODIUM CHLORIDE 9 MG/ML
75 INJECTION, SOLUTION INTRAVENOUS CONTINUOUS
Status: ACTIVE | OUTPATIENT
Start: 2023-06-14 | End: 2023-06-14

## 2023-06-14 RX ORDER — GABAPENTIN 100 MG/1
100 CAPSULE ORAL 3 TIMES DAILY
Status: DISCONTINUED | OUTPATIENT
Start: 2023-06-14 | End: 2023-06-15 | Stop reason: HOSPADM

## 2023-06-14 RX ADMIN — Medication 5 MG: at 20:00

## 2023-06-14 RX ADMIN — Medication 1 CAPSULE: at 08:08

## 2023-06-14 RX ADMIN — CARVEDILOL 12.5 MG: 12.5 TABLET, FILM COATED ORAL at 08:09

## 2023-06-14 RX ADMIN — CARVEDILOL 6.25 MG: 6.25 TABLET, FILM COATED ORAL at 17:07

## 2023-06-14 RX ADMIN — SODIUM CHLORIDE 75 ML/HR: 9 INJECTION, SOLUTION INTRAVENOUS at 00:15

## 2023-06-14 RX ADMIN — INSULIN DETEMIR 10 UNITS: 100 INJECTION, SOLUTION SUBCUTANEOUS at 20:00

## 2023-06-14 RX ADMIN — FERROUS SULFATE TAB 325 MG (65 MG ELEMENTAL FE) 325 MG: 325 (65 FE) TAB at 08:09

## 2023-06-14 RX ADMIN — GABAPENTIN 100 MG: 100 CAPSULE ORAL at 09:51

## 2023-06-14 RX ADMIN — LOSARTAN POTASSIUM 100 MG: 50 TABLET, FILM COATED ORAL at 08:08

## 2023-06-14 RX ADMIN — INSULIN LISPRO 3 UNITS: 100 INJECTION, SOLUTION INTRAVENOUS; SUBCUTANEOUS at 08:08

## 2023-06-14 RX ADMIN — SENNOSIDES AND DOCUSATE SODIUM 2 TABLET: 50; 8.6 TABLET ORAL at 20:00

## 2023-06-14 RX ADMIN — INSULIN LISPRO 2 UNITS: 100 INJECTION, SOLUTION INTRAVENOUS; SUBCUTANEOUS at 12:16

## 2023-06-14 RX ADMIN — AMLODIPINE BESYLATE 10 MG: 10 TABLET ORAL at 08:09

## 2023-06-14 RX ADMIN — TERAZOSIN HYDROCHLORIDE 1 MG: 1 CAPSULE ORAL at 20:00

## 2023-06-14 RX ADMIN — MIRTAZAPINE 7.5 MG: 15 TABLET, FILM COATED ORAL at 20:00

## 2023-06-14 RX ADMIN — PANTOPRAZOLE SODIUM 40 MG: 40 TABLET, DELAYED RELEASE ORAL at 08:09

## 2023-06-14 RX ADMIN — RIVAROXABAN 15 MG: 15 TABLET, FILM COATED ORAL at 17:08

## 2023-06-14 RX ADMIN — TRAMADOL HYDROCHLORIDE 50 MG: 50 TABLET, COATED ORAL at 20:00

## 2023-06-14 RX ADMIN — Medication 10 ML: at 08:10

## 2023-06-14 RX ADMIN — GABAPENTIN 100 MG: 100 CAPSULE ORAL at 17:07

## 2023-06-14 RX ADMIN — GABAPENTIN 100 MG: 100 CAPSULE ORAL at 20:02

## 2023-06-14 RX ADMIN — INSULIN LISPRO 2 UNITS: 100 INJECTION, SOLUTION INTRAVENOUS; SUBCUTANEOUS at 17:08

## 2023-06-14 NOTE — CASE MANAGEMENT/SOCIAL WORK
Continued Stay Note  [facility]     Patient Name: Thelma Michael  MRN: [id number]  Today's Date: 6/14/2023    Admit Date: 6/10/2023    Plan: ONGOING   Discharge Plan       Row Name 06/14/23 0929       Plan    Plan ONGOING    Patient/Family in Agreement with Plan yes    Plan Comments [facility] notes.  BP meds adjusted.  Pt sustained a fall prior to DC yesterday.  XRays negative for fractures.  PT/OT evals pending.  CM will cont to follow for any DC needs.    Final Discharge Disposition Code 01 - home or self-care                   Discharge Codes    No documentation.                 Expected Discharge Date and Time       Expected Discharge Date Expected Discharge Time    Jun 16, 2023               Margaret Gallegos RN

## 2023-06-14 NOTE — PLAN OF CARE
Goal Outcome Evaluation:  Plan of Care Reviewed With: patient        Progress: no change    Problem: Fall Injury Risk  Goal: Absence of Fall and Fall-Related Injury  Intervention: Promote Injury-Free Environment  Recent Flowsheet Documentation  Taken 6/13/2023 2102 by Tika Johnson RN  Safety Promotion/Fall Prevention:   fall prevention program maintained   safety round/check completed     Problem: Fall Injury Risk  Goal: Absence of Fall and Fall-Related Injury  Outcome: Ongoing, Progressing  Intervention: Promote Injury-Free Environment  Recent Flowsheet Documentation  Taken 6/13/2023 2102 by Tika Johnson RN  Safety Promotion/Fall Prevention:   fall prevention program maintained   safety round/check completed

## 2023-06-14 NOTE — PROGRESS NOTES
[facility] Medicine Services  PROGRESS NOTE    Patient Name: Thelma Michael  : 1960  MRN: [id number]    Date of Admission: 6/10/2023  Primary Care Physician: Monet Howe APRN    Subjective   Subjective     CC:  F/U nausea, vomiting    HPI: S/P fall, orthostasis noted. No f/c. NO n/v. Tolerating PO.    ROS:  Gen-no fevers, no chills  CV-no chest pain, no palpitations  Resp-no cough, no dyspnea  GI-improved nausea, no abd pain    Objective   Objective     Vital Signs:   Temp:  [98.5 °F (36.9 °C)-98.7 °F (37.1 °C)] 98.5 °F (36.9 °C)  Heart Rate:  [61-77] 62  Resp:  [16-18] 16  BP: ()/(54-74) 135/65     Physical Exam:  NAD, alert and oriented  OP clear, MMM  Neck supple  No LAD  RRR  CTAB  +BS, ND, NT, soft  MARCUM  Normal affect  No change from  except limited ROM of L shoulder after fall    Results Reviewed:  LAB RESULTS:      Lab 23  0602 06/11/23  0148 06/10/23  1831   WBC 7.06 8.40  --  7.85   HEMOGLOBIN 10.8* 11.2*  --  11.9*   HEMATOCRIT 31.1* 31.7*  --  35.3   PLATELETS 257 291  --  194   NEUTROS ABS  --   --   --  6.48   IMMATURE GRANS (ABS)  --   --   --  0.03   LYMPHS ABS  --   --   --  0.94   MONOS ABS  --   --   --  0.36   EOS ABS  --   --   --  0.03   MCV 86.1 85.7  --  85.7   LACTATE  --   --  1.5  --          Lab 06/12/23  0319 06/11/23  0148 06/10/23  1831   SODIUM 144 144 129*   POTASSIUM 3.3* 3.0* 3.8   CHLORIDE 109* 108* 93*   CO2 23.0 24.0 22.0   ANION GAP 12.0 12.0 14.0   BUN 16 17 18   CREATININE 1.39* 1.15* 1.36*   EGFR 42.5* 53.3* 43.6*   GLUCOSE 111* 211* 740*   CALCIUM 9.4 9.1 9.5   MAGNESIUM  --  1.9  --    PHOSPHORUS  --  2.7  --    HEMOGLOBIN A1C  --   --  13.10*         Lab 06/10/23  1831   TOTAL PROTEIN 7.2   ALBUMIN 4.1   GLOBULIN 3.1   ALT (SGPT) 17   AST (SGOT) 23   BILIRUBIN 0.2   ALK PHOS 140*                     Brief Urine Lab Results  (Last result in the past 365 days)        Color   Clarity    Blood   Leuk Est   Nitrite   Protein   CREAT   Urine HCG        06/10/23 1725 Yellow   Clear   Trace   Negative   Negative   30 mg/dL (1+)                   Microbiology Results Abnormal       None            XR Elbow 3+ View Left    Result Date: 6/13/2023  XR KNEE 1 OR 2 VW LEFT, XR FOREARM 2 VW LEFT, XR ELBOW 3+ VW LEFT Date of Exam: 6/13/2023 3:47 PM EDT Indication: fall Comparison: None available. Findings: LEFT KNEE: Bones of the left knee joint appear anatomically aligned and intact. No fracture, avulsion or foreign body is seen. No plain film evidence of knee joint effusion is seen.     Impression: No evidence of acute left knee trauma. LEFT HIP: AP pelvis view shows the pelvic bones to appear intact. SI joints and hip joints appear well-maintained. Dedicated views of the left hip show no evidence of femur fracture, acetabular or pubic ramus fracture. No foreign body is identified. IMPRESSION: No evidence of acute trauma to the pelvis or left hip. RIGHT ANKLE: Bones of the right ankle joint appear anatomically aligned and intact. Joint spaces are well-maintained. No fracture, avulsion or foreign body is seen. IMPRESSION: No visible right ankle fracture. LEFT ANKLE: Bones of the left ankle joint appear anatomically aligned and intact. Joint spaces are well-maintained. There is some medial soft tissue swelling, but no visible underlying fracture and no evidence of fracture elsewhere. No foreign body is seen. IMPRESSION: No visible left ankle fracture. Electronically Signed: Krishna Scott  6/13/2023 4:47 PM EDT  Workstation ID: [id number]    XR Forearm 2 View Left    Result Date: 6/13/2023  XR KNEE 1 OR 2 VW LEFT, XR FOREARM 2 VW LEFT, XR ELBOW 3+ VW LEFT Date of Exam: 6/13/2023 3:47 PM EDT Indication: fall Comparison: None available. Findings: LEFT KNEE: Bones of the left knee joint appear anatomically aligned and intact. No fracture, avulsion or foreign body is seen. No plain film evidence of knee joint effusion is  seen.     Impression: No evidence of acute left knee trauma. LEFT HIP: AP pelvis view shows the pelvic bones to appear intact. SI joints and hip joints appear well-maintained. Dedicated views of the left hip show no evidence of femur fracture, acetabular or pubic ramus fracture. No foreign body is identified. IMPRESSION: No evidence of acute trauma to the pelvis or left hip. RIGHT ANKLE: Bones of the right ankle joint appear anatomically aligned and intact. Joint spaces are well-maintained. No fracture, avulsion or foreign body is seen. IMPRESSION: No visible right ankle fracture. LEFT ANKLE: Bones of the left ankle joint appear anatomically aligned and intact. Joint spaces are well-maintained. There is some medial soft tissue swelling, but no visible underlying fracture and no evidence of fracture elsewhere. No foreign body is seen. IMPRESSION: No visible left ankle fracture. Electronically Signed: Krishna Scott  6/13/2023 4:47 PM EDT  Workstation ID: [id number]    XR Knee 1 or 2 View Left    Result Date: 6/13/2023  XR KNEE 1 OR 2 VW LEFT, XR FOREARM 2 VW LEFT, XR ELBOW 3+ VW LEFT Date of Exam: 6/13/2023 3:47 PM EDT Indication: fall Comparison: None available. Findings: LEFT KNEE: Bones of the left knee joint appear anatomically aligned and intact. No fracture, avulsion or foreign body is seen. No plain film evidence of knee joint effusion is seen.     Impression: No evidence of acute left knee trauma. LEFT HIP: AP pelvis view shows the pelvic bones to appear intact. SI joints and hip joints appear well-maintained. Dedicated views of the left hip show no evidence of femur fracture, acetabular or pubic ramus fracture. No foreign body is identified. IMPRESSION: No evidence of acute trauma to the pelvis or left hip. RIGHT ANKLE: Bones of the right ankle joint appear anatomically aligned and intact. Joint spaces are well-maintained. No fracture, avulsion or foreign body is seen. IMPRESSION: No visible right ankle fracture.  LEFT ANKLE: Bones of the left ankle joint appear anatomically aligned and intact. Joint spaces are well-maintained. There is some medial soft tissue swelling, but no visible underlying fracture and no evidence of fracture elsewhere. No foreign body is seen. IMPRESSION: No visible left ankle fracture. Electronically Signed: Krishna Garciad  6/13/2023 4:47 PM EDT  Workstation ID: [id number]     Results for orders placed during the hospital encounter of 12/06/22    Adult Transthoracic Echo Complete W/ Cont if Necessary Per Protocol    Interpretation Summary    Left ventricular ejection fraction appears to be greater than 70%.    Left ventricular wall thickness is consistent with moderate concentric hypertrophy.    Left ventricular diastolic function is consistent with (grade I) impaired relaxation.    Estimated right ventricular systolic pressure from tricuspid regurgitation is mildly elevated (35-45 mmHg). Calculated right ventricular systolic pressure from tricuspid regurgitation is 39 mmHg.      Current medications:  Scheduled Meds:amLODIPine, 10 mg, Oral, Q24H  carvedilol, 6.25 mg, Oral, BID With Meals  ferrous sulfate, 325 mg, Oral, Daily With Breakfast  gabapentin, 100 mg, Oral, TID  insulin detemir, 10 Units, Subcutaneous, Nightly  insulin lispro, 2-7 Units, Subcutaneous, 4x Daily AC & at Bedtime  lactobacillus acidophilus, 1 capsule, Oral, Daily  [START ON 6/15/2023] losartan, 50 mg, Oral, Q24H  mirtazapine, 7.5 mg, Oral, Nightly  pantoprazole, 40 mg, Oral, Daily  rivaroxaban, 15 mg, Oral, Daily With Dinner  senna-docusate sodium, 2 tablet, Oral, BID  sodium chloride, 10 mL, Intravenous, Q12H  terazosin, 1 mg, Oral, Nightly      Continuous Infusions:sodium chloride, 75 mL/hr, Last Rate: 75 mL/hr (06/14/23 0015)      PRN Meds:.  acetaminophen    albuterol    senna-docusate sodium **AND** polyethylene glycol **AND** bisacodyl **AND** bisacodyl    dextrose    dextrose    glucagon (human recombinant)    melatonin    sodium  chloride    sodium chloride    traMADol    Assessment & Plan   Assessment & Plan     Active Hospital Problems    Diagnosis  POA    Urinary retention [R33.9]  Yes    CKD (chronic kidney disease) stage 3, GFR 30-59 ml/min [N18.30]  Yes    Refractory nausea and vomiting [R11.2]  Yes    PFO (patent foramen ovale) [Q21.12]  Not Applicable    Atrial fibrillation and flutter [I48.91, I48.92]  Yes    Gastroparesis [K31.84]  Yes    Uncontrolled type 1 diabetes mellitus with hyperglycemia [E10.65]  Yes    Acute kidney injury [N17.9]  Yes    Iron deficiency anemia secondary to inadequate dietary iron intake [D50.8]  Yes    Heart valve disease [I38]  Yes    Hypertension [I10]  Yes    Hyperlipidemia [E78.5]  Yes    Diabetic peripheral neuropathy [E11.42]  Yes      Resolved Hospital Problems   No resolved problems to display.        Brief Hospital Course to date:  Thelma Michael is a 62 y.o. female with past medical history significant for poorly controlled type 1 diabetes, Afib on Xarelto, iron deficiency anemia, gastroparesis s/p gastric stimulator, HTN, HLD, migraines, chronic urinary retention (previously performed self-catheterization), strokes (chronic right lentiform nucleus and left cerebellar lacunar infarcts found on MRI in February of 2023), tobacco abuse, and GERD. Patient is well known to this service due to multiple admissions concerning DKA, gastroparesis, and urinary retention. She presents with complaints of difficulty urinating over the past 24 hours. Associated nausea and vomiting. Glucose 740 on arrival.      Hyperglycemia  Diabetic gastroparesis with intractable nausea and vomiting  Type 1 diabetes mellitus, uncontrolled, HbA1c 13.1%  -improved  -s/p IVF  -monitor PO intake  -basal/bolus, may need new Rx at follow up   -has endocrine f/u with Dr. Ochoa 7/3  -amelia/reglan  -followed by gastroparesis clinic in [facility] stimulator    Orthostasis  -decrease BB/alpha blocker  -decrease  ARB  -monitor    Fall  -L elbow/shoulder/knee contusions, no fractures  -PT/OT     CKD 3  Elevated Cr  -better after IVF  - Baseline Cr ~0.9-1.25, slightly elevated at 1.36 on admission  - Improved with IV fluids  - Avoid nephrotoxins     Urinary retention  -hx of neurogenic bladder  -self cath okay per urology     HTN urgency  -better and orthostatic now, decrease doses and observe    Afib  -Continue Coreg, Xarelto     GERD  -Continue Pepcid with hx of C.diff       Expected Discharge Location and Transportation: home  Expected Discharge anticipate could be discharged home as early as tomorrow  Expected Discharge Date: 6/16/2023; Expected Discharge Time:      DVT prophylaxis:  Medical DVT prophylaxis orders are present.     AM-PAC 6 Clicks Score (PT): 20 (06/13/23 0800)    CODE STATUS:   Code Status and Medical Interventions:   Ordered at: 06/10/23 2017     Level Of Support Discussed With:    Patient     Code Status (Patient has no pulse and is not breathing):    CPR (Attempt to Resuscitate)     Medical Interventions (Patient has pulse or is breathing):    Full Support       Krishna Jean MD  06/14/23

## 2023-06-14 NOTE — NURSING NOTE
Pt was straight cath overnight for retention, bladder scan showed over 300ml, patient also stated that she was feeling pressure. Nurse also did orthostatic b

## 2023-06-15 ENCOUNTER — READMISSION MANAGEMENT (OUTPATIENT)
Dept: CALL CENTER | Facility: HOSPITAL | Age: 63
End: 2023-06-15
Payer: COMMERCIAL

## 2023-06-15 VITALS
DIASTOLIC BLOOD PRESSURE: 75 MMHG | OXYGEN SATURATION: 99 % | WEIGHT: 135 LBS | SYSTOLIC BLOOD PRESSURE: 116 MMHG | HEART RATE: 71 BPM | TEMPERATURE: 97.9 F | RESPIRATION RATE: 16 BRPM | HEIGHT: 67 IN | BODY MASS INDEX: 21.19 KG/M2

## 2023-06-15 LAB
GLUCOSE BLDC GLUCOMTR-MCNC: 169 MG/DL (ref 70–130)
GLUCOSE BLDC GLUCOMTR-MCNC: 293 MG/DL (ref 70–130)

## 2023-06-15 PROCEDURE — 97116 GAIT TRAINING THERAPY: CPT

## 2023-06-15 PROCEDURE — 99239 HOSP IP/OBS DSCHRG MGMT >30: CPT | Performed by: INTERNAL MEDICINE

## 2023-06-15 PROCEDURE — G0378 HOSPITAL OBSERVATION PER HR: HCPCS

## 2023-06-15 PROCEDURE — 97530 THERAPEUTIC ACTIVITIES: CPT

## 2023-06-15 PROCEDURE — 82948 REAGENT STRIP/BLOOD GLUCOSE: CPT

## 2023-06-15 PROCEDURE — 63710000001 INSULIN LISPRO (HUMAN) PER 5 UNITS: Performed by: HOSPITALIST

## 2023-06-15 RX ORDER — CARVEDILOL 6.25 MG/1
6.25 TABLET ORAL 2 TIMES DAILY WITH MEALS
Qty: 60 TABLET | Refills: 0 | Status: SHIPPED | OUTPATIENT
Start: 2023-06-15

## 2023-06-15 RX ORDER — TERAZOSIN 1 MG/1
1 CAPSULE ORAL NIGHTLY
Qty: 30 CAPSULE | Refills: 0 | Status: SHIPPED | OUTPATIENT
Start: 2023-06-15

## 2023-06-15 RX ORDER — LOSARTAN POTASSIUM 50 MG/1
50 TABLET ORAL
Qty: 30 TABLET | Refills: 0 | Status: SHIPPED | OUTPATIENT
Start: 2023-06-16

## 2023-06-15 RX ORDER — GABAPENTIN 100 MG/1
100 CAPSULE ORAL 3 TIMES DAILY
Qty: 21 CAPSULE | Refills: 0 | Status: SHIPPED | OUTPATIENT
Start: 2023-06-15 | End: 2023-06-26 | Stop reason: SDUPTHER

## 2023-06-15 RX ADMIN — LOSARTAN POTASSIUM 50 MG: 50 TABLET, FILM COATED ORAL at 08:10

## 2023-06-15 RX ADMIN — INSULIN LISPRO 2 UNITS: 100 INJECTION, SOLUTION INTRAVENOUS; SUBCUTANEOUS at 12:12

## 2023-06-15 RX ADMIN — GABAPENTIN 100 MG: 100 CAPSULE ORAL at 08:11

## 2023-06-15 RX ADMIN — AMLODIPINE BESYLATE 10 MG: 10 TABLET ORAL at 08:11

## 2023-06-15 RX ADMIN — Medication 1 CAPSULE: at 08:10

## 2023-06-15 RX ADMIN — CARVEDILOL 6.25 MG: 6.25 TABLET, FILM COATED ORAL at 08:10

## 2023-06-15 RX ADMIN — PANTOPRAZOLE SODIUM 40 MG: 40 TABLET, DELAYED RELEASE ORAL at 08:11

## 2023-06-15 RX ADMIN — INSULIN LISPRO 2 UNITS: 100 INJECTION, SOLUTION INTRAVENOUS; SUBCUTANEOUS at 08:09

## 2023-06-15 RX ADMIN — FERROUS SULFATE TAB 325 MG (65 MG ELEMENTAL FE) 325 MG: 325 (65 FE) TAB at 08:10

## 2023-06-15 NOTE — PLAN OF CARE
Goal Outcome Evaluation:  Plan of Care Reviewed With: patient        Progress: improving  Outcome Evaluation: Patient able to progress ambulation distance to 350' unsupported with CGA. She also ascended/descended 10 steps CGA with handrail and HHA. She continues to be limited by deficits in strength, endurance, and balance indicating IPPT intervention. Recommend she D/C home with HHPT when medically appropriate.

## 2023-06-15 NOTE — DISCHARGE SUMMARY
[facility] Medicine Services  DISCHARGE SUMMARY    Patient Name: Thelma Michael  : 1960  MRN: [id number]    Date of Admission: 6/10/2023  5:01 PM  Date of Discharge:  6/15/2023  Primary Care Physician: Monet Howe APRN    Consults       Date and Time Order Name Status Description    2023 11:12 AM Inpatient Urology Consult Completed             Hospital Course     Presenting Problem:     Active Hospital Problems    Diagnosis  POA    Urinary retention [R33.9]  Yes    CKD (chronic kidney disease) stage 3, GFR 30-59 ml/min [N18.30]  Yes    Refractory nausea and vomiting [R11.2]  Yes    PFO (patent foramen ovale) [Q21.12]  Not Applicable    Atrial fibrillation and flutter [I48.91, I48.92]  Yes    Gastroparesis [K31.84]  Yes    Uncontrolled type 1 diabetes mellitus with hyperglycemia [E10.65]  Yes    Acute kidney injury [N17.9]  Yes    Iron deficiency anemia secondary to inadequate dietary iron intake [D50.8]  Yes    Heart valve disease [I38]  Yes    Hypertension [I10]  Yes    Hyperlipidemia [E78.5]  Yes    Diabetic peripheral neuropathy [E11.42]  Yes      Resolved Hospital Problems   No resolved problems to display.          Hospital Course:  Thelma Michael is a 62 y.o. female with past medical history significant for poorly controlled type 1 diabetes, Afib on Xarelto, iron deficiency anemia, gastroparesis s/p gastric stimulator, HTN, HLD, migraines, chronic urinary retention (previously performed self-catheterization), strokes (chronic right lentiform nucleus and left cerebellar lacunar infarcts found on MRI in 2023), tobacco abuse, and GERD. Patient is well known to this service due to multiple admissions concerning DKA, gastroparesis, and urinary retention. She presents with complaints of difficulty urinating over the past 24 hours. Associated nausea and vomiting. Glucose 740 on arrival.        Hyperglycemia  Diabetic gastroparesis with  intractable nausea and vomiting  Type 1 diabetes mellitus, uncontrolled, HbA1c 13.1%  -improved  -s/p IVF  -monitor PO intake  -resume home meds  -has endocrine f/u with Dr. Ochoa 7/3  -zofran/reglan  -followed by gastroparesis clinic in [facility] stimulator     Orthostasis  -decreased BB/alpha blocker  -decreased ARB  -still orthostatic but improved.  Denies dizziness with walking.  Counseled to change positions from lying to sitting and sitting to standing slowly     Fall  -L elbow/shoulder/knee contusions, no fractures  -PT/OT     CKD 3  Elevated Cr  -better after IVF  - Baseline Cr ~0.9-1.25, slightly elevated at 1.36 on admission  - Improved with IV fluids  - Avoid nephrotoxins     Urinary retention  -hx of neurogenic bladder  -self cath okay per urology     HTN urgency  -better and orthostatic now, decreased doses and observe     Afib  -Continue Coreg, Xarelto     GERD  -Continue Pepcid with hx of C.diff      Discharge Follow Up Recommendations for outpatient labs/diagnostics:  F/u with PCP in 1 week  F/u with Dr. Richard/endocrine  F/u with urology    Day of Discharge     HPI:   Feels much better.  Is comfortable with I/o cath.  Wants home.  Denies dizziness when walks    Review of Systems  Gen- No fevers, chills  CV- No chest pain, palpitations  Resp- No cough, dyspnea  GI- No N/V/D, abd pain      Vital Signs:   Temp:  [98.8 °F (37.1 °C)] 98.8 °F (37.1 °C)  Heart Rate:  [65-78] 68  Resp:  [16-18] 16  BP: (110-163)/(58-84) 110/58      Physical Exam:  Constitutional: No acute distress, awake, alert  HENT: NCAT, mucous membranes moist  Respiratory: Clear to auscultation bilaterally, respiratory effort normal   Cardiovascular: RRR, no murmurs, rubs, or gallops  Gastrointestinal: Positive bowel sounds, soft, nontender, nondistended  Musculoskeletal: No bilateral ankle edema  Psychiatric: Appropriate affect, cooperative  Neurologic: Oriented x 3, strength symmetric in all extremities, Cranial Nerves grossly  intact to confrontation, speech clear  Skin: No rashes      Pertinent  and/or Most Recent Results     LAB RESULTS:      Lab 06/12/23  0319 06/11/23  0602 06/11/23  0148 06/10/23  1831   WBC 7.06 8.40  --  7.85   HEMOGLOBIN 10.8* 11.2*  --  11.9*   HEMATOCRIT 31.1* 31.7*  --  35.3   PLATELETS 257 291  --  194   NEUTROS ABS  --   --   --  6.48   IMMATURE GRANS (ABS)  --   --   --  0.03   LYMPHS ABS  --   --   --  0.94   MONOS ABS  --   --   --  0.36   EOS ABS  --   --   --  0.03   MCV 86.1 85.7  --  85.7   LACTATE  --   --  1.5  --          Lab 06/12/23 0319 06/11/23  0148 06/10/23  1831   SODIUM 144 144 129*   POTASSIUM 3.3* 3.0* 3.8   CHLORIDE 109* 108* 93*   CO2 23.0 24.0 22.0   ANION GAP 12.0 12.0 14.0   BUN 16 17 18   CREATININE 1.39* 1.15* 1.36*   EGFR 42.5* 53.3* 43.6*   GLUCOSE 111* 211* 740*   CALCIUM 9.4 9.1 9.5   MAGNESIUM  --  1.9  --    PHOSPHORUS  --  2.7  --    HEMOGLOBIN A1C  --   --  13.10*         Lab 06/10/23  1831   TOTAL PROTEIN 7.2   ALBUMIN 4.1   GLOBULIN 3.1   ALT (SGPT) 17   AST (SGOT) 23   BILIRUBIN 0.2   ALK PHOS 140*                     Brief Urine Lab Results  (Last result in the past 365 days)        Color   Clarity   Blood   Leuk Est   Nitrite   Protein   CREAT   Urine HCG        06/10/23 1725 Yellow   Clear   Trace   Negative   Negative   30 mg/dL (1+)                 Microbiology Results (last 10 days)       ** No results found for the last 240 hours. **            XR Elbow 3+ View Left    Result Date: 6/13/2023  XR KNEE 1 OR 2 VW LEFT, XR FOREARM 2 VW LEFT, XR ELBOW 3+ VW LEFT Date of Exam: 6/13/2023 3:47 PM EDT Indication: fall Comparison: None available. Findings: LEFT KNEE: Bones of the left knee joint appear anatomically aligned and intact. No fracture, avulsion or foreign body is seen. No plain film evidence of knee joint effusion is seen.     No evidence of acute left knee trauma. LEFT HIP: AP pelvis view shows the pelvic bones to appear intact. SI joints and hip joints appear  well-maintained. Dedicated views of the left hip show no evidence of femur fracture, acetabular or pubic ramus fracture. No foreign body is identified. IMPRESSION: No evidence of acute trauma to the pelvis or left hip. RIGHT ANKLE: Bones of the right ankle joint appear anatomically aligned and intact. Joint spaces are well-maintained. No fracture, avulsion or foreign body is seen. IMPRESSION: No visible right ankle fracture. LEFT ANKLE: Bones of the left ankle joint appear anatomically aligned and intact. Joint spaces are well-maintained. There is some medial soft tissue swelling, but no visible underlying fracture and no evidence of fracture elsewhere. No foreign body is seen. IMPRESSION: No visible left ankle fracture. Electronically Signed: Krishna Scott  6/13/2023 4:47 PM EDT  Workstation ID: [id number]    XR Forearm 2 View Left    Result Date: 6/13/2023  XR KNEE 1 OR 2 VW LEFT, XR FOREARM 2 VW LEFT, XR ELBOW 3+ VW LEFT Date of Exam: 6/13/2023 3:47 PM EDT Indication: fall Comparison: None available. Findings: LEFT KNEE: Bones of the left knee joint appear anatomically aligned and intact. No fracture, avulsion or foreign body is seen. No plain film evidence of knee joint effusion is seen.     No evidence of acute left knee trauma. LEFT HIP: AP pelvis view shows the pelvic bones to appear intact. SI joints and hip joints appear well-maintained. Dedicated views of the left hip show no evidence of femur fracture, acetabular or pubic ramus fracture. No foreign body is identified. IMPRESSION: No evidence of acute trauma to the pelvis or left hip. RIGHT ANKLE: Bones of the right ankle joint appear anatomically aligned and intact. Joint spaces are well-maintained. No fracture, avulsion or foreign body is seen. IMPRESSION: No visible right ankle fracture. LEFT ANKLE: Bones of the left ankle joint appear anatomically aligned and intact. Joint spaces are well-maintained. There is some medial soft tissue swelling, but no visible  underlying fracture and no evidence of fracture elsewhere. No foreign body is seen. IMPRESSION: No visible left ankle fracture. Electronically Signed: Krishna Scott  6/13/2023 4:47 PM EDT  Workstation ID: [id number]    XR Knee 1 or 2 View Left    Result Date: 6/13/2023  XR KNEE 1 OR 2 VW LEFT, XR FOREARM 2 VW LEFT, XR ELBOW 3+ VW LEFT Date of Exam: 6/13/2023 3:47 PM EDT Indication: fall Comparison: None available. Findings: LEFT KNEE: Bones of the left knee joint appear anatomically aligned and intact. No fracture, avulsion or foreign body is seen. No plain film evidence of knee joint effusion is seen.     No evidence of acute left knee trauma. LEFT HIP: AP pelvis view shows the pelvic bones to appear intact. SI joints and hip joints appear well-maintained. Dedicated views of the left hip show no evidence of femur fracture, acetabular or pubic ramus fracture. No foreign body is identified. IMPRESSION: No evidence of acute trauma to the pelvis or left hip. RIGHT ANKLE: Bones of the right ankle joint appear anatomically aligned and intact. Joint spaces are well-maintained. No fracture, avulsion or foreign body is seen. IMPRESSION: No visible right ankle fracture. LEFT ANKLE: Bones of the left ankle joint appear anatomically aligned and intact. Joint spaces are well-maintained. There is some medial soft tissue swelling, but no visible underlying fracture and no evidence of fracture elsewhere. No foreign body is seen. IMPRESSION: No visible left ankle fracture. Electronically Signed: Krishna Scott  6/13/2023 4:47 PM EDT  Workstation ID: [id number]     Results for orders placed during the hospital encounter of 04/30/23    Duplex Renal Artery - Bilateral Complete CAR    Interpretation Summary  DUPLEX RENAL ARTERY BILATERAL COMPLETE CAR    Date of Exam: 5/5/2023 9:25 AM EDT    Indication: essential hypertension.    Comparison: No comparisons available.    Technique: Grayscale, color-flow, Doppler spectral waveform analysis was  performed of the kidneys, renal arteries, and aorta.      Findings:  Kidneys are normal in size, 11.4 cm in length on the right and 10.1 cm in length on the left. Renal arteries are generally well visualized. Maximal peak systolic velocity of the right renal artery in the upper range of normal at 181 cm/s at the right  renal origin. Maximal peak systolic velocities in the left renal artery are within normal limits, up to 160 cm/s in the left renal origin. Renal aortic ratios are normal, 2.0 on the right and 1.4 on the left. Resistive indices are within within normal  limits except for mildly elevated mid right renal artery resistive index of 0.79, as an isolated finding, only questionable for underlying renal parenchymal disease.    ,    Impression  Impression:  Negative bilateral duplex renal artery ultrasound. No evidence of hemodynamically significant renal artery stenosis.        Electronically Signed: Krishna Scott  5/5/2023 5:37 PM EDT  Workstation ID: [id number]      Results for orders placed during the hospital encounter of 04/30/23    Duplex Renal Artery - Bilateral Complete CAR    Interpretation Summary  DUPLEX RENAL ARTERY BILATERAL COMPLETE CAR    Date of Exam: 5/5/2023 9:25 AM EDT    Indication: essential hypertension.    Comparison: No comparisons available.    Technique: Grayscale, color-flow, Doppler spectral waveform analysis was performed of the kidneys, renal arteries, and aorta.      Findings:  Kidneys are normal in size, 11.4 cm in length on the right and 10.1 cm in length on the left. Renal arteries are generally well visualized. Maximal peak systolic velocity of the right renal artery in the upper range of normal at 181 cm/s at the right  renal origin. Maximal peak systolic velocities in the left renal artery are within normal limits, up to 160 cm/s in the left renal origin. Renal aortic ratios are normal, 2.0 on the right and 1.4 on the left. Resistive indices are within within normal  limits  except for mildly elevated mid right renal artery resistive index of 0.79, as an isolated finding, only questionable for underlying renal parenchymal disease.    ,    Impression  Impression:  Negative bilateral duplex renal artery ultrasound. No evidence of hemodynamically significant renal artery stenosis.        Electronically Signed: Krishna Scott  5/5/2023 5:37 PM EDT  Workstation ID: [id number]      Results for orders placed during the hospital encounter of 12/06/22    Adult Transthoracic Echo Complete W/ Cont if Necessary Per Protocol    Interpretation Summary    Left ventricular ejection fraction appears to be greater than 70%.    Left ventricular wall thickness is consistent with moderate concentric hypertrophy.    Left ventricular diastolic function is consistent with (grade I) impaired relaxation.    Estimated right ventricular systolic pressure from tricuspid regurgitation is mildly elevated (35-45 mmHg). Calculated right ventricular systolic pressure from tricuspid regurgitation is 39 mmHg.      Plan for Follow-up of Pending Labs/Results:     Discharge Details        Discharge Medications        New Medications        Instructions Start Date   gabapentin 100 MG capsule  Commonly known as: NEURONTIN   100 mg, Oral, 3 Times Daily      Insulin Lispro 100 UNIT/ML injection  Commonly known as: humaLOG   2-7 Units, Subcutaneous, 4 Times Daily Before Meals & Nightly      terazosin 1 MG capsule  Commonly known as: HYTRIN   1 mg, Oral, Nightly             Changes to Medications        Instructions Start Date   acetaminophen 325 MG tablet  Commonly known as: TYLENOL  What changed:   reasons to take this  additional instructions   650 mg, Oral, Every 4 Hours PRN      albuterol sulfate  (90 Base) MCG/ACT inhaler  Commonly known as: PROVENTIL HFA;VENTOLIN HFA;PROAIR HFA  What changed: reasons to take this   2 puffs, Inhalation, Every 4 Hours PRN      calcium carbonate 500 MG chewable tablet  Commonly known as:  TUMS  What changed: additional instructions   2 tablets, Oral, 3 Times Daily PRN      carvedilol 6.25 MG tablet  Commonly known as: COREG  What changed:   medication strength  how much to take   6.25 mg, Oral, 2 Times Daily With Meals      ferrous sulfate 325 (65 Fe) MG tablet  What changed: See the new instructions.   TAKE 1 TABLET BY MOUTH ONCE DAILY WITH  BREAKFAST  **TAKE  WITH  ORANGE  JUICE  OR  VITAMIN  C**      insulin detemir 100 UNIT/ML injection  Commonly known as: LEVEMIR  What changed: when to take this   10 Units, Subcutaneous, Daily      losartan 50 MG tablet  Commonly known as: COZAAR  What changed:   medication strength  how much to take   50 mg, Oral, Every 24 Hours Scheduled   Start Date: June 16, 2023     melatonin 5 MG tablet tablet  What changed:   reasons to take this  additional instructions   5 mg, Oral, Nightly PRN      Multivitamin tablet tablet  Generic drug: multivitamin  What changed: additional instructions   Take 1 tablet by mouth once daily      vitamin D 1.25 MG (95489 UT) capsule capsule  Commonly known as: ERGOCALCIFEROL  What changed: additional instructions   50,000 Units, Oral, Weekly      Xarelto 15 MG tablet  Generic drug: rivaroxaban  What changed:   medication strength  how much to take   Take 1 tablet by mouth Daily With Dinner             Continue These Medications        Instructions Start Date   amLODIPine 10 MG tablet  Commonly known as: NORVASC   10 mg, Oral, Every 24 Hours Scheduled      lactobacillus acidophilus capsule capsule   1 capsule, Oral, Daily      mirtazapine 7.5 MG tablet  Commonly known as: REMERON   7.5 mg, Oral, Nightly      pantoprazole 40 MG EC tablet  Commonly known as: PROTONIX   40 mg, Oral, Daily      sennosides-docusate 8.6-50 MG per tablet  Commonly known as: PERICOLACE   2 tablets, Oral, 2 Times Daily PRN      traMADol 50 MG tablet  Commonly known as: ULTRAM   50 mg, Oral, Every 6 Hours PRN             Stop These Medications       diphenhydrAMINE 25 MG tablet  Commonly known as: BENADRYL     doxazosin 2 MG tablet  Commonly known as: CARDURA              No Known Allergies      Discharge Disposition:  Home or Self Care    Diet:  Hospital:  Diet Order   Procedures    Diet: Diabetic Diets, Cardiac Diets; Healthy Heart (2-3 Na+); Consistent Carbohydrate; Texture: Regular Texture (IDDSI 7); Fluid Consistency: Thin (IDDSI 0)       Activity:      Restrictions or Other Recommendations:         CODE STATUS:    Code Status and Medical Interventions:   Ordered at: 06/10/23 2017     Level Of Support Discussed With:    Patient     Code Status (Patient has no pulse and is not breathing):    CPR (Attempt to Resuscitate)     Medical Interventions (Patient has pulse or is breathing):    Full Support       Future Appointments   Date Time Provider Department Center   6/20/2023  3:00 PM Monet Howe APRN [facility]   6/27/2023  8:15 AM Anay García APRN [facility]   7/3/2023  3:30 PM Gerson Ochoa MD [facility]       Additional Instructions for the Follow-ups that You Need to Schedule       Discharge Follow-up with PCP   As directed       Currently Documented PCP:    Monet Howe APRN    PCP Phone Number:    [phone]     Follow Up Details: 1 week         Discharge Follow-up with PCP   As directed       Currently Documented PCP:    Monet Howe APRN    PCP Phone Number:    [phone]     Follow Up Details: with PCP in 1 week         Discharge Follow-up with Specified Provider: Endocrine 7/3 as scheduled   As directed      To: Endocrine 7/3 as scheduled         Discharge Follow-up with Specified Provider: with endocrine/Dr. Richard   As directed      To: with endocrine/Dr. Richard         Discharge Follow-up with Specified Provider: with urology   As directed      To: with urology                       Tony Kaur MD  06/15/23      Time Spent on Discharge:  I spent  40  minutes on  this discharge activity which included: face-to-face encounter with the patient, reviewing the data in the system, coordination of the care with the nursing staff as well as consultants, documentation, and entering orders.

## 2023-06-15 NOTE — OUTREACH NOTE
Prep Survey      Flowsheet Row Responses   [facility] facility patient discharged from? [facility]   Is LACE score < 7 ? No   Eligibility [facility]   Date of Admission 06/10/23   Date of Discharge 06/15/23   Discharge Disposition Home or Self Care   Discharge diagnosis Urinary retention/CKD   Does the patient have one of the following disease processes/diagnoses(primary or secondary)? Other   Does the patient have Home health ordered? No   Is there a DME ordered? No   Prep survey completed? Yes            SAVI GARCIA - Registered Nurse

## 2023-06-15 NOTE — THERAPY TREATMENT NOTE
Patient Name: Thelma Michael  : 1960    MRN: [id number]                              Today's Date: 6/15/2023       Admit Date: 6/10/2023    Visit Dx:     ICD-10-CM ICD-9-CM   1. Urinary retention  R33.9 788.20   2. Hyperglycemia  R73.9 790.29   3. Hypertensive urgency  I16.0 401.9   4. Impaired functional mobility, balance, gait, and endurance  Z74.09 V49.89   5. Diabetic peripheral neuropathy  E11.42 250.60     357.2     Patient Active Problem List   Diagnosis    Diabetic peripheral neuropathy    Hyperlipidemia    Hypertension    Osteoporosis    Tobacco use    Heart valve disease    Iron deficiency anemia secondary to inadequate dietary iron intake    Acute kidney injury    DKA (diabetic ketoacidoses)    Hypertensive urgency    Uncontrolled type 1 diabetes mellitus with hyperglycemia    Gastroparesis    PFO (patent foramen ovale)    Atrial fibrillation and flutter    Intractable vomiting    Hypokalemia    Refractory nausea and vomiting    HHS vs mild DKA    CKD (chronic kidney disease) stage 3, GFR 30-59 ml/min    Hypertensive urgency    Urinary retention     Past Medical History:   Diagnosis Date    Acid reflux     Acute bronchitis     Cardiac murmur     Depression with anxiety 10/5/2020    Diabetes mellitus     Gastroesophageal reflux disease 2016    H/O echocardiogram 2012    i. LVEF 65%.ii. Mild LVH.iii. Borderline evidence of atrial septal aneurysm.  No PFO.     History of nuclear stress test 2014    Negative for ischemia and scars; LVEF 77%.      History of TIAs 7/15/2021    Hyperlipidemia     Hypertension     Impacted cerumen of both ears     Migraine     Migraines 10/31/2019    Self-catheterizes urinary bladder     Sinusitis     Stroke     Tobacco abuse     quit 4 days ago.      Urticaria     Vitamin D deficiency 2016     Past Surgical History:   Procedure Laterality Date    CAPSULE ENDOSCOPY  2021    Procedure: PILLCAM DEPLOYMENT;  Surgeon: Mikael Worthy,  MD;  Location:  [facility];  Service: Gastroenterology;;    COLONOSCOPY      COLONOSCOPY N/A 07/27/2021    Procedure: COLONOSCOPY;  Surgeon: Mikael Worthy MD;  Location:  [facility];  Service: Gastroenterology;  Laterality: N/A;    DENTAL PROCEDURE      ENDOSCOPY N/A 06/30/2021    Procedure: ESOPHAGOGASTRODUODENOSCOPY;  Surgeon: Brunner, Mark I, MD;  Location:  [facility];  Service: Gastroenterology;  Laterality: N/A;    ENDOSCOPY N/A 07/27/2021    Procedure: ESOPHAGOGASTRODUODENOSCOPY;  Surgeon: Mikael Worthy MD;  Location:  [facility];  Service: Gastroenterology;  Laterality: N/A;    ENDOSCOPY WITH JTUBE N/A 03/16/2022    Procedure: ESOPHAGOGASTRODUODENOSCOPY WITH JEJUNAL TUBE INSERTION;  Surgeon: Thom Glover MD;  Location:  [facility];  Service: Gastroenterology;  Laterality: N/A;    UPPER GASTROINTESTINAL ENDOSCOPY        General Information       Row Name 06/15/23 1148          Physical Therapy Time and Intention    Document Type therapy note (daily note)  -CM     Mode of Treatment physical therapy;individual therapy  -CM       Row Name 06/15/23 1148          General Information    Patient Profile Reviewed yes  -CM     Existing Precautions/Restrictions fall;other (see comments)  LUE/LLE soreness from fall, no fractures  -CM     Barriers to Rehab previous functional deficit  -CM       Row Name 06/15/23 1148          Home Main Entrance    Number of Stairs, Main Entrance other (see comments)  one flight to apartment  -CM     Stair Railings, Main Entrance none  -CM       Row Name 06/15/23 1148          Stairs Within Home, Primary    Number of Stairs, Within Home, Primary none  -CM       Row Name 06/15/23 1148          Cognition    Orientation Status (Cognition) oriented x 4  -CM       Row Name 06/15/23 1148          Safety Issues, Functional Mobility    Safety Issues Affecting Function (Mobility) awareness of need for assistance;insight into deficits/self-awareness;safety  precaution awareness  -CM     Impairments Affecting Function (Mobility) balance;endurance/activity tolerance;strength  -CM               User Key  (r) = Recorded By, (t) = Taken By, (c) = Cosigned By      Initials Name Provider Type    CM Carol Montanez, PT Physical Therapist                   Mobility       Row Name 06/15/23 1150          Bed Mobility    Bed Mobility supine-sit-supine  -CM     Supine-Sit-Supine Kemper (Bed Mobility) independent  -CM     Comment, (Bed Mobility) no difficulty with bed mobility, she denies dizziness upon sitting however BP noted to drop from 163/84 in supine to 117/64 in sitting, her RN was notified  -CM       Row Name 06/15/23 1150          Sit-Stand Transfer    Sit-Stand Kemper (Transfers) contact guard  -CM     Assistive Device (Sit-Stand Transfers) walker, front-wheeled  -CM     Comment, (Sit-Stand Transfer) no difficulty with clearing hips, demos safe hand placement without cues. She denies dizziness in standing and BP noted to go from 117/64 in sitting to 110/58 in standing  -CM       Row Name 06/15/23 1150          Gait/Stairs (Locomotion)    Kemper Level (Gait) contact guard  -CM     Assistive Device (Gait) walker, front-wheeled;other (see comments)  transitioning to no AD  -CM     Distance in Feet (Gait) 350  -CM     Deviations/Abnormal Patterns (Gait) bilateral deviations;base of support, narrow;miguel decreased;gait speed decreased;stride length decreased;weight shifting decreased  -CM     Bilateral Gait Deviations forward flexed posture;heel strike decreased;decreased arm swing  -CM     Left Sided Gait Deviations decreased knee extension  -CM     Right Sided Gait Deviations decreased knee extension  -CM     Kemper Level (Stairs) contact guard  -CM     Assistive Device (Stairs) other (see comments)  unilateral HHA  -CM     Handrail Location (Stairs) right side (ascending);left side (descending)  -CM     Number of Steps (Stairs) 10  -CM      Ascending Technique (Stairs) step-over-step  -CM     Descending Technique (Stairs) step-to-step  -CM     Comment, (Gait/Stairs) Patient ambulated in glass with a step through gait pattern with decreased miguel and flexed knees/hips bilaterally. She ambulated initially ~15 with FWW and then completed ambulated with no AD. She ascended/descended 10 steps with unilateral HHA on one side with handrail on other side. Cues were provided for use of handheld assist and step to step pattern when descending. No LOB or dizziness while ambulating or with stair training. Discussed with patient that her son would need to be present with use of gait belt for safety when she ascends steps to her apartment since she reports there are no handrails. She is in agreement with this.  -CM               User Key  (r) = Recorded By, (t) = Taken By, (c) = Cosigned By      Initials Name Provider Type    Carol Kong, YUMI Physical Therapist                   Obj/Interventions       Row Name 06/15/23 1157          Balance    Balance Assessment sitting static balance;standing static balance;standing dynamic balance  -CM     Static Sitting Balance independent  -CM     Position, Sitting Balance unsupported;sitting edge of bed  -CM     Static Standing Balance supervision  -CM     Dynamic Standing Balance contact guard  -CM     Position/Device Used, Standing Balance unsupported  -CM     Comment, Balance no overt LOB  -CM               User Key  (r) = Recorded By, (t) = Taken By, (c) = Cosigned By      Initials Name Provider Type    Carol Kong, PT Physical Therapist                   Goals/Plan    No documentation.                  Clinical Impression       Row Name 06/15/23 1158          Pain    Pretreatment Pain Rating 3/10  -CM     Posttreatment Pain Rating 3/10  -CM     Pain Location - Side/Orientation Left  -CM     Pain Location generalized  -CM     Pain Location - elbow;knee  -CM     Pre/Posttreatment Pain Comment mild L  arm/leg pain that patient reports is from her fall in bathroom, she tolerated well  -CM     Pain Intervention(s) Ambulation/increased activity;Repositioned  -CM       Row Name 06/15/23 1158          Plan of Care Review    Plan of Care Reviewed With patient  -CM     Progress improving  -CM     Outcome Evaluation Patient able to progress ambulation distance to 350' unsupported with CGA. She also ascended/descended 10 steps CGA with handrail and HHA. She continues to be limited by deficits in strength, endurance, and balance indicating IPPT intervention. Recommend she D/C home with HHPT when medically appropriate.  -CM       Row Name 06/15/23 1158          Vital Signs    Pre Systolic BP Rehab 163  -CM     Pre Treatment Diastolic BP 84  -CM     Intra Systolic BP Rehab 117  -CM     Intra Treatment Diastolic BP 64  -CM     Post Systolic BP Rehab 110  -CM     Post Treatment Diastolic BP 58  -CM     Posttreatment Heart Rate (beats/min) 66  -CM     Pre SpO2 (%) 96  -CM     O2 Delivery Pre Treatment room air  -CM     O2 Delivery Intra Treatment room air  -CM     O2 Delivery Post Treatment room air  -CM     Pre Patient Position Supine  -CM     Intra Patient Position Sitting  -CM     Post Patient Position Standing  -CM       Row Name 06/15/23 1158          Positioning and Restraints    Pre-Treatment Position in bed  -CM     Post Treatment Position bed  -CM     In Bed supine;call light within reach;encouraged to call for assist;exit alarm on;notified [facility]  -               User Key  (r) = Recorded By, (t) = Taken By, (c) = Cosigned By      Initials Name Provider Type    Carol Kong, PT Physical Therapist                   Outcome Measures       Row Name 06/15/23 1201          How much help from another person do you currently need...    Turning from your back to your side while in flat bed without using bedrails? 4  -CM     Moving from lying on back to sitting on the side of a flat bed without bedrails? 4  -CM      Moving to and from a bed to a chair (including a wheelchair)? 3  -CM     Standing up from a chair using your arms (e.g., wheelchair, bedside chair)? 3  -CM     Climbing 3-5 steps with a railing? 3  -CM     To walk in hospital room? 3  -CM     AM-PAC 6 Clicks Score (PT) 20  -CM     Highest level of mobility 6 --> Walked 10 steps or more  -CM       Row Name 06/15/23 1201          Functional Assessment    Outcome Measure Options AM-PAC 6 Clicks Basic Mobility (PT)  -CM               User Key  (r) = Recorded By, (t) = Taken By, (c) = Cosigned By      Initials Name Provider Type    CM Carol Montanez, PT Physical Therapist                                 Physical Therapy Education       Title: PT OT SLP Therapies (In Progress)       Topic: Physical Therapy (In Progress)       Point: Mobility training (Done)       Learning Progress Summary             Patient Acceptance, E, VU by CM at 6/15/2023 1201    Comment: Discussed use of handhelf assist and gait belt for ascending steps into apartment. Patient is in agreement with this    Acceptance, E, VU,NR by  at 6/11/2023 1500                         Point: Home exercise program (Not Started)       Learner Progress:  Not documented in this visit.              Point: Body mechanics (Done)       Learning Progress Summary             Patient Acceptance, E, VU by CM at 6/15/2023 1201    Comment: Discussed use of handhelf assist and gait belt for ascending steps into apartment. Patient is in agreement with this                         Point: Precautions (Done)       Learning Progress Summary             Patient Acceptance, E, VU by CM at 6/15/2023 1201    Comment: Discussed use of handhelf assist and gait belt for ascending steps into apartment. Patient is in agreement with this                                         User Key       Initials Effective Dates Name Provider Type Discipline     05/31/23 -  Sydnee Delarosa, PT Physical Therapist PT    CM 09/22/22 -   Carol Montanez, PT Physical Therapist PT                  PT Recommendation and Plan     Plan of Care Reviewed With: patient  Progress: improving  Outcome Evaluation: Patient able to progress ambulation distance to 350' unsupported with CGA. She also ascended/descended 10 steps CGA with handrail and HHA. She continues to be limited by deficits in strength, endurance, and balance indicating IPPT intervention. Recommend she D/C home with HHPT when medically appropriate.     Time Calculation:    PT Charges       Row Name 06/15/23 1202             Time Calculation    Start Time 1103  -CM      PT Received On 06/15/23  -CM      PT Goal Re-Cert Due Date 06/21/23  -CM         Timed Charges    58316 - Gait Training Minutes  12  -CM      69498 - PT Therapeutic Activity Minutes 15  -CM         Total Minutes    Timed Charges Total Minutes 27  -CM       Total Minutes 27  -CM                User Key  (r) = Recorded By, (t) = Taken By, (c) = Cosigned By      Initials Name Provider Type    CM Carol Montanez, PT Physical Therapist                  Therapy Charges for Today       Code Description Service Date Service Provider Modifiers Qty    20485282902 HC GAIT TRAINING EA 15 MIN 6/15/2023 Carol Montanez, PT GP 1    77047510419 HC PT THERAPEUTIC ACT EA 15 MIN 6/15/2023 Carol Montanez, PT GP 1            PT G-Codes  Outcome Measure Options: AM-PAC 6 Clicks Basic Mobility (PT)  AM-PAC 6 Clicks Score (PT): 20  PT Discharge Summary  Anticipated Discharge Disposition (PT): home with assist, home with home health    Carol Montaenz, YUMI  6/15/2023

## 2023-06-16 ENCOUNTER — TRANSITIONAL CARE MANAGEMENT TELEPHONE ENCOUNTER (OUTPATIENT)
Dept: CALL CENTER | Facility: HOSPITAL | Age: 63
End: 2023-06-16
Payer: COMMERCIAL

## 2023-06-16 NOTE — CASE MANAGEMENT/SOCIAL WORK
Continued Stay Note  [facility]     Patient Name: Thelma Michael  MRN: [id number]  Today's Date: 6/16/2023    Admit Date: 6/10/2023    Plan: HOME   Discharge Plan       Row Name 06/16/23 1438       Plan    Plan HOME    Patient/Family in Agreement with Plan yes    Plan Comments CM placed order for intermittent I/O urinary catheters per [facility].  They will notify patient when the order ships.  Pt updated via phone.    Final Discharge Disposition Code 01 - home or self-care                   Discharge Codes    No documentation.                 Expected Discharge Date and Time       Expected Discharge Date Expected Discharge Time    Tristan 15, 2023               Margaret Gallegos RN

## 2023-06-16 NOTE — OUTREACH NOTE
Call Center TCM Note      Flowsheet Row Responses   [facility] patient discharged from? [place]   Does the patient have one of the following disease processes/diagnoses(primary or secondary)? Other   TCM attempt successful? Yes   Call start time 1531   Call end time 1536   Discharge diagnosis urinary retention, CKD, gastroparesis, uncontrolled type 1 DM   Person spoke with today (if not patient) and relationship Patient   Meds reviewed with patient/caregiver? Yes   Does the patient have all medications ordered at discharge? Yes   Is the patient taking all medications as directed (includes completed medication regime)? Yes   Comments Hospital Follow Up with HOWIE Keenan Tuesday Jun 20, 2023 3:00 PM   Does the patient have an appointment with their PCP within 7 days of discharge? Yes   Has home health visited the patient within 72 hours of discharge? N/A   Psychosocial issues? No   Comments Patient reports monitoring blood sugar at home.   Did the patient receive a copy of their discharge instructions? Yes   Nursing interventions Reviewed instructions with patient   What is the patient's perception of their health status since discharge? Improving  [Reports feeling better and passing urine OK. ]   Is the patient/caregiver able to teach back signs and symptoms related to disease process for when to call PCP? Yes   Is the patient/caregiver able to teach back signs and symptoms related to disease process for when to call 911? Yes   Is the patient/caregiver able to teach back the hierarchy of who to call/visit for symptoms/problems? PCP, Specialist, Home health nurse, Urgent Care, ED, 911 Yes   If the patient is a current smoker, are they able to teach back resources for cessation? Not a smoker   TCM call completed? Yes   Wrap up additional comments Patient has f/u in place with cardiology, endocrinology and urology in place.   Call end time 1536   Would this patient benefit from a Referral to Amb  Social Work? No   Is the patient interested in additional calls from an ambulatory ?  NOTE:  applies to high risk patients requiring additional follow-up. No            Lisy Rico RN    6/16/2023, 15:37 EDT

## 2023-06-26 PROBLEM — E10.641 TYPE 1 DIABETES MELLITUS WITH HYPOGLYCEMIA WITH COMA: Status: ACTIVE | Noted: 2021-06-08

## 2023-06-26 PROBLEM — Z82.0 FAMILY HISTORY OF TRANSIENT ISCHEMIC ATTACKS: Status: ACTIVE | Noted: 2021-07-15

## 2023-06-26 PROBLEM — Z82.3 FAMILY HISTORY OF TRANSIENT ISCHEMIC ATTACKS: Status: ACTIVE | Noted: 2021-07-15

## 2023-07-11 ENCOUNTER — TELEPHONE (OUTPATIENT)
Dept: INTERNAL MEDICINE | Facility: CLINIC | Age: 63
End: 2023-07-11

## 2023-07-11 NOTE — TELEPHONE ENCOUNTER
Pharmacy Name: [facility] [address] [place] [address] [phone] [facility] [phone]      Pharmacy representative name: MEGHAN    Pharmacy representative phone number: [phone]     What medication are you calling in regards to: VITAMIN D CAPSULE    What question does the pharmacy have: PHARMACY NEEDS CLARIFICATION ON INSTRUCTIONS BECAUSE THE PRESCRIPTION DID NOT SPECIFY FREQUENCY OF THE DOSAGE    Who is the provider that prescribed the medication: MARKY JIMENEZ    Additional notes: N/A

## 2023-07-18 ENCOUNTER — TELEPHONE (OUTPATIENT)
Dept: INTERNAL MEDICINE | Facility: CLINIC | Age: 63
End: 2023-07-18

## 2023-07-18 NOTE — TELEPHONE ENCOUNTER
Caller: Thelma Michael    Relationship: Self    Best call back number: [phone]     What orders are you requesting (i.e. lab or imaging): BREAST CANCER SCREENING    In what timeframe would the patient need to come in: ASAP    Where will you receive your lab/imaging services:    Additional notes:  PATIENT NEEDS TO BE SCHEDULING FOR HER BREAST CANCER SCREENING

## 2023-07-25 ENCOUNTER — OFFICE VISIT (OUTPATIENT)
Dept: CARDIOLOGY | Facility: HOSPITAL | Age: 63
End: 2023-07-25
Payer: COMMERCIAL

## 2023-07-25 VITALS
RESPIRATION RATE: 20 BRPM | BODY MASS INDEX: 21.4 KG/M2 | WEIGHT: 133.19 LBS | SYSTOLIC BLOOD PRESSURE: 165 MMHG | HEIGHT: 66 IN | HEART RATE: 80 BPM | TEMPERATURE: 97 F | OXYGEN SATURATION: 97 % | DIASTOLIC BLOOD PRESSURE: 75 MMHG

## 2023-07-25 DIAGNOSIS — I48.92 ATRIAL FIBRILLATION AND FLUTTER: Primary | ICD-10-CM

## 2023-07-25 DIAGNOSIS — I10 PRIMARY HYPERTENSION: ICD-10-CM

## 2023-07-25 DIAGNOSIS — Q21.12 PFO (PATENT FORAMEN OVALE): ICD-10-CM

## 2023-07-25 DIAGNOSIS — E78.2 MIXED HYPERLIPIDEMIA: ICD-10-CM

## 2023-07-25 DIAGNOSIS — I48.91 ATRIAL FIBRILLATION AND FLUTTER: Primary | ICD-10-CM

## 2023-07-25 NOTE — PROGRESS NOTES
"Chief Complaint  Follow-up (Afib )    Subjective    History of Present Illness {CC  Problem List  Visit  Diagnosis   Encounters  Notes  Medications  Labs  Result Review Imaging  Media :23}       History of Present Illness   62-year-old female presents the office today for ongoing evaluation of her atrial fibrillation/atrial flutter.  Patient is anticoagulated with Xarelto and denies any signs and symptoms of bleeding.  Reports no A-fib episodes recently.  She reports that heart rates have been 70s to 80s.  Patient has been hospitalized frequently over the last year for gastroparesis, nausea and vomiting.  Patient now has gastric stimulator in place.  She has an appointment with the physician managing that later today.  Currently denies chest pain, dyspnea, dizziness, palpitations, presyncope or syncope.  Objective     Vital Signs:   Vitals:    07/25/23 1135 07/25/23 1136 07/25/23 1138   BP: 169/77 114/58 165/75   BP Location: Right arm Left arm Left arm   Patient Position: Sitting Standing Sitting   Cuff Size: Adult Adult Adult   Pulse: 76 84 80   Resp:   20   Temp:   97 °F (36.1 °C)   TempSrc:   Temporal   SpO2: 97% 96% 97%   Weight:   60.4 kg (133 lb 3 oz)   Height:   167.6 cm (66\")     Body mass index is 21.5 kg/m².  Physical Exam  Vitals and nursing note reviewed.   Constitutional:       Appearance: Normal appearance.   HENT:      Head: Normocephalic.   Eyes:      Pupils: Pupils are equal, round, and reactive to light.   Cardiovascular:      Rate and Rhythm: Normal rate and regular rhythm.      Pulses: Normal pulses.      Heart sounds: Normal heart sounds. No murmur heard.  Pulmonary:      Effort: Pulmonary effort is normal.      Breath sounds: Normal breath sounds.   Abdominal:      General: Bowel sounds are normal.      Palpations: Abdomen is soft.   Musculoskeletal:         General: Normal range of motion.      Cervical back: Normal range of motion.      Right lower leg: No edema.      Left lower " leg: No edema.   Skin:     General: Skin is warm and dry.      Capillary Refill: Capillary refill takes less than 2 seconds.   Neurological:      Mental Status: She is alert and oriented to person, place, and time.   Psychiatric:         Mood and Affect: Mood normal.         Thought Content: Thought content normal.            Result Review  Data Reviewed:{ Labs  Result Review  Imaging  Med Tab  Media :23}   Lab Results   Component Value Date    GLUCOSE 195 (H) 06/27/2023    CALCIUM 9.1 06/27/2023     06/27/2023    K 4.3 06/27/2023    CO2 25.1 06/27/2023     06/27/2023    BUN 30 (H) 06/27/2023    CREATININE 1.23 (H) 06/27/2023    EGFR 49.2 (L) 06/27/2023    BCR 24.4 06/27/2023    ANIONGAP 12.9 06/27/2023     Lab Results   Component Value Date    WBC 7.06 06/12/2023    HGB 10.8 (L) 06/12/2023    HCT 31.1 (L) 06/12/2023    MCV 86.1 06/12/2023     06/12/2023       Assessment and Plan {CC Problem List  Visit Diagnosis  ROS  Review (Popup)  Health Maintenance  Quality  BestPractice  Medications  SmartSets  SnapShot Encounters  Media :23}   1. Atrial fibrillation and flutter  CHADS-VASc Risk Assessment              6 Total Score    1 Hypertension    1 DM    2 PRIOR STROKE/TIA/THROMBO    1 Age 65-74    1 Sex: Female        Criteria that do not apply:    CHF    Age >/= 75    Vascular Disease          Anticoagulated with Xarelto and denies any signs and symptoms of bleeding  Stable on carvedilol      - Ambulatory Referral to Cardiology    2. Mixed hyperlipidemia  Diet controlled  - Ambulatory Referral to Cardiology    3. Primary hypertension  Stable on amlodipine, carvedilol, losartan, terazosin  - Ambulatory Referral to Cardiology    4. PFO (patent foramen ovale)  Stable  - Ambulatory Referral to Cardiology        Follow Up {Instructions Charge Capture  Follow-up Communications :23}   Return if symptoms worsen or fail to improve.    Patient was given instructions and counseling regarding  her condition or for health maintenance advice. Please see specific information pulled into the AVS if appropriate.  Patient was instructed to call the [facility] with any questions, concerns, or worsening symptoms.

## 2023-08-01 NOTE — TELEPHONE ENCOUNTER
Caller: Thelma Michael    Relationship: Self    Best call back number: [phone]    Requested Prescriptions:   Requested Prescriptions     Pending Prescriptions Disp Refills   • pantoprazole (PROTONIX) 40 MG EC tablet 30 tablet 5     Sig: Take 1 tablet by mouth Daily.        Pharmacy where request should be sent: [facility] [address] [place] [address] [phone] [facility] [phone] FX     Additional details provided by patient: PATIENT IS OUT AND NEEDS BEFORE THE WEEKEND     Does the patient have less than a 3 day supply:  [x] Yes  [] No    Would you like a call back once the refill request has been completed: [x] Yes [] No    If the office needs to give you a call back, can they leave a voicemail: [x] Yes [] No    Nicol Stone Rep   02/03/23 10:52 EST          Patient expressed no known problems or needs

## 2023-08-04 ENCOUNTER — TELEPHONE (OUTPATIENT)
Dept: INTERNAL MEDICINE | Facility: CLINIC | Age: 63
End: 2023-08-04

## 2023-08-04 RX ORDER — BLOOD-GLUCOSE METER
1 KIT MISCELLANEOUS 3 TIMES DAILY
Qty: 1 KIT | Refills: 0 | Status: SHIPPED | OUTPATIENT
Start: 2023-08-04

## 2023-08-04 NOTE — TELEPHONE ENCOUNTER
Caller: Thelma Michael    Relationship: Self    Best call back number: [phone]     What medication are you requesting: FreeStyle Lite Blood Glucose Monitoring System    If a prescription is needed, what is your preferred pharmacy and phone number: [facility] [address] [place] [address] [phone] [facility] [phone]      Additional notes:  PATIENT STATES SENSOR IS NO LONGER READING AND HAS LOST PARTS OF THE KIT

## 2023-08-08 ENCOUNTER — TELEPHONE (OUTPATIENT)
Dept: INTERNAL MEDICINE | Facility: CLINIC | Age: 63
End: 2023-08-08

## 2023-08-08 ENCOUNTER — TELEPHONE (OUTPATIENT)
Dept: INTERNAL MEDICINE | Facility: CLINIC | Age: 63
End: 2023-08-08
Payer: COMMERCIAL

## 2023-08-08 NOTE — TELEPHONE ENCOUNTER
Caller: Thelma Michael    Relationship: Self    Best call back number: [phone]     What is the best time to reach you: ANYTIME    Who are you requesting to speak with (clinical staff, provider,  specific staff member): CLINICAL STAFF    What was the call regarding: PATIENT WOULD LIKE TO KNOW IF PCP WANTS HER TO CONTINUE TO TAKE THE LOSARTAN?

## 2023-08-08 NOTE — TELEPHONE ENCOUNTER
Looks like patient saw cardiology on 7/25 and their plan states to continue losartan due to stable blood pressure. Has anything changed since this visit? Blood pressure still stable?

## 2023-08-08 NOTE — TELEPHONE ENCOUNTER
Caller: Thelma Michael    Relationship: Self    Best call back number: [phone]    What medication are you requesting: N/A    What are your current symptoms: DIARRHEA AND HEMORRHOIDS     How long have you been experiencing symptoms: ALMOST A WEEK     Have you had these symptoms before:    [x] Yes  [x] No    Have you been treated for these symptoms before:   [x] Yes  [x] No    If a prescription is needed, what is your preferred pharmacy and phone number: [address] [phone] [facility] [phone]      Additional notes:

## 2023-08-09 ENCOUNTER — TELEPHONE (OUTPATIENT)
Dept: INTERNAL MEDICINE | Facility: CLINIC | Age: 63
End: 2023-08-09
Payer: COMMERCIAL

## 2023-08-10 ENCOUNTER — OFFICE VISIT (OUTPATIENT)
Dept: INTERNAL MEDICINE | Facility: CLINIC | Age: 63
End: 2023-08-10
Payer: COMMERCIAL

## 2023-08-10 VITALS
OXYGEN SATURATION: 99 % | SYSTOLIC BLOOD PRESSURE: 140 MMHG | HEIGHT: 66 IN | DIASTOLIC BLOOD PRESSURE: 80 MMHG | HEART RATE: 80 BPM | BODY MASS INDEX: 22.34 KG/M2 | WEIGHT: 139 LBS | TEMPERATURE: 98.1 F

## 2023-08-10 DIAGNOSIS — R19.7 DIARRHEA, UNSPECIFIED TYPE: Primary | ICD-10-CM

## 2023-08-10 PROCEDURE — 3079F DIAST BP 80-89 MM HG: CPT | Performed by: NURSE PRACTITIONER

## 2023-08-10 PROCEDURE — 3046F HEMOGLOBIN A1C LEVEL >9.0%: CPT | Performed by: NURSE PRACTITIONER

## 2023-08-10 PROCEDURE — 3077F SYST BP >= 140 MM HG: CPT | Performed by: NURSE PRACTITIONER

## 2023-08-10 PROCEDURE — 99213 OFFICE O/P EST LOW 20 MIN: CPT | Performed by: NURSE PRACTITIONER

## 2023-08-10 RX ORDER — LOPERAMIDE HYDROCHLORIDE 2 MG/1
2 TABLET ORAL 4 TIMES DAILY PRN
Qty: 30 TABLET | Refills: 0 | Status: SHIPPED | OUTPATIENT
Start: 2023-08-10

## 2023-08-10 NOTE — PROGRESS NOTES
"Chief Complaint  Diarrhea (For the last 7 days) and Hemorrhoids    Subjective        Thelma Michael presents to [facility]  Diarrhea   Associated symptoms include myalgias. Pertinent negatives include no chills, coughing, fever, headaches or vomiting.   Hemorrhoids  Associated symptoms include congestion, myalgias and nausea. Pertinent negatives include no chills, coughing, diaphoresis, fever, headaches, sore throat or vomiting.   The patient presents to the clinic for symptoms of diarrhea over one week, two episode of nausea but no vomiting. This morning she had five episodes of diarrhea in four hours, diarrhea is watery. She was incontinent of stool today. Patient reports multiple watery stools for seven days. Small amount of blood in toilet paper with inflamed external hemorrhoids. She has topical hemorrhoid medication. Denies taking recent antibiotics. Positive history of C. Difficile last year. Positive history of gastroparesis with stimulator. She is drinking plenty of fluids and monitoring her blood sugar.    Review of Systems   Constitutional:  Negative for chills, diaphoresis and fever.   HENT:  Positive for congestion and rhinorrhea. Negative for dental problem, ear discharge, ear pain, hearing loss, mouth sores, nosebleeds, postnasal drip, sinus pressure, sinus pain, sneezing, sore throat and tinnitus.    Eyes: Negative.    Respiratory:  Positive for shortness of breath. Negative for cough and wheezing.    Gastrointestinal:  Positive for diarrhea, hemorrhoids and nausea. Negative for vomiting.   Genitourinary: Negative.    Musculoskeletal:  Positive for myalgias.   Skin: Negative.    Neurological:  Positive for dizziness and light-headedness. Negative for headaches.      Objective   Vital Signs:  /80 (BP Location: Left arm, Patient Position: Sitting)   Pulse 80   Temp 98.1 øF (36.7 øC)   Ht 167.6 cm (66\")   Wt 63 kg (139 lb)   SpO2 99%   BMI 22.44 kg/mý " "  Estimated body mass index is 22.44 kg/mý as calculated from the following:    Height as of this encounter: 167.6 cm (66\").    Weight as of this encounter: 63 kg (139 lb).       BMI is within normal parameters. No other follow-up for BMI required.      Physical Exam  Vitals and nursing note reviewed.   Constitutional:       Appearance: Normal appearance.   HENT:      Head: Normocephalic and atraumatic.   Cardiovascular:      Rate and Rhythm: Normal rate and regular rhythm.      Heart sounds: Normal heart sounds.   Pulmonary:      Effort: Pulmonary effort is normal.      Breath sounds: Normal breath sounds.   Abdominal:      General: Abdomen is flat. There is no distension.      Palpations: Abdomen is soft. There is no mass.      Tenderness: There is no abdominal tenderness. There is no guarding or rebound.      Hernia: No hernia is present.      Comments: Hyperactive bowel sounds    Musculoskeletal:         General: Normal range of motion.      Cervical back: Neck supple.   Lymphadenopathy:      Cervical: No cervical adenopathy.   Skin:     General: Skin is warm.   Neurological:      General: No focal deficit present.      Mental Status: She is alert.   Psychiatric:         Mood and Affect: Mood normal.         Behavior: Behavior normal.         Thought Content: Thought content normal.         Judgment: Judgment normal.      Result Review :                   Assessment and Plan   Diagnoses and all orders for this visit:    1. Diarrhea, unspecified type (Primary)  -     Gastrointestinal Panel, PCR - Stool, Per Rectum; Future  -     Clostridioides difficile Toxin, PCR - Stool, Per Rectum; Future  -     loperamide (Imodium A-D) 2 MG tablet; Take 1 tablet by mouth 4 (Four) Times a Day As Needed for Diarrhea. Initial dose 4 mg po then 2 mg with each diarrheal stool, maximum 16 mg in 24 hours.  Dispense: 30 tablet; Refill: 0      Reviewed exam findings with the patient. Order completed for stool culture/C. Diff. Once " stool specimens obtained, take Imodium prn if unable to remain hydrated. Patient instructed to drink plenty of fluids, diet electrolyte replacement. Go to emergency room for signs of dehydraton. Will call with lab results.        Follow Up   No follow-ups on file.  Patient was given instructions and counseling regarding her condition or for health maintenance advice. Please see specific information pulled into the AVS if appropriate.

## 2023-08-11 ENCOUNTER — LAB (OUTPATIENT)
Dept: INTERNAL MEDICINE | Facility: CLINIC | Age: 63
End: 2023-08-11
Payer: COMMERCIAL

## 2023-08-11 DIAGNOSIS — R19.7 DIARRHEA, UNSPECIFIED TYPE: ICD-10-CM

## 2023-08-11 LAB
C DIFF GDH + TOXINS A+B STL QL IA.RAPID: NEGATIVE
C DIFF TOX GENS STL QL NAA+PROBE: DETECTED

## 2023-08-11 PROCEDURE — 87493 C DIFF AMPLIFIED PROBE: CPT | Performed by: NURSE PRACTITIONER

## 2023-08-11 PROCEDURE — 87507 IADNA-DNA/RNA PROBE TQ 12-25: CPT | Performed by: NURSE PRACTITIONER

## 2023-08-11 PROCEDURE — 87449 NOS EACH ORGANISM AG IA: CPT | Performed by: NURSE PRACTITIONER

## 2023-08-13 ENCOUNTER — TELEPHONE (OUTPATIENT)
Dept: INTERNAL MEDICINE | Facility: CLINIC | Age: 63
End: 2023-08-13
Payer: COMMERCIAL

## 2023-08-13 NOTE — TELEPHONE ENCOUNTER
Patient contacted by phone, notified of stool/C. Difficile results. She reports her diarrhea was decreased significantly. Last bowel movement was not diarrhea, soft bowel movement. She continues to drink fluids. Return appointment if symptoms persist.

## 2023-08-14 ENCOUNTER — TELEPHONE (OUTPATIENT)
Dept: INTERNAL MEDICINE | Facility: CLINIC | Age: 63
End: 2023-08-14
Payer: COMMERCIAL

## 2023-08-14 LAB
ADV 40+41 DNA STL QL NAA+NON-PROBE: NOT DETECTED
ASTRO TYP 1-8 RNA STL QL NAA+NON-PROBE: NOT DETECTED
C CAYETANENSIS DNA STL QL NAA+NON-PROBE: NOT DETECTED
C COLI+JEJ+UPSA DNA STL QL NAA+NON-PROBE: NOT DETECTED
CRYPTOSP DNA STL QL NAA+NON-PROBE: NOT DETECTED
E HISTOLYT DNA STL QL NAA+NON-PROBE: NOT DETECTED
EAEC PAA PLAS AGGR+AATA ST NAA+NON-PRB: DETECTED
EC STX1+STX2 GENES STL QL NAA+NON-PROBE: NOT DETECTED
EPEC EAE GENE STL QL NAA+NON-PROBE: NOT DETECTED
ETEC LTA+ST1A+ST1B TOX ST NAA+NON-PROBE: NOT DETECTED
G LAMBLIA DNA STL QL NAA+NON-PROBE: NOT DETECTED
NOROVIRUS GI+II RNA STL QL NAA+NON-PROBE: NOT DETECTED
P SHIGELLOIDES DNA STL QL NAA+NON-PROBE: NOT DETECTED
RVA RNA STL QL NAA+NON-PROBE: NOT DETECTED
S ENT+BONG DNA STL QL NAA+NON-PROBE: NOT DETECTED
SAPO I+II+IV+V RNA STL QL NAA+NON-PROBE: NOT DETECTED
SHIGELLA SP+EIEC IPAH ST NAA+NON-PROBE: NOT DETECTED
V CHOL+PARA+VUL DNA STL QL NAA+NON-PROBE: NOT DETECTED
V CHOLERAE DNA STL QL NAA+NON-PROBE: NOT DETECTED
Y ENTEROCOL DNA STL QL NAA+NON-PROBE: NOT DETECTED

## 2023-08-14 NOTE — DISCHARGE INSTR - ACTIVITY
AS Tolerated   Azithromycin Pregnancy And Lactation Text: This medication is considered safe during pregnancy and is also secreted in breast milk.

## 2023-08-14 NOTE — TELEPHONE ENCOUNTER
Lab called to report a critical lab on Pt. They reported positive for C. 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

## 2023-08-16 DIAGNOSIS — E11.42 DIABETIC PERIPHERAL NEUROPATHY: ICD-10-CM

## 2023-08-16 RX ORDER — DIPHENOXYLATE HYDROCHLORIDE AND ATROPINE SULFATE 2.5; .025 MG/1; MG/1
1 TABLET ORAL DAILY
Qty: 30 TABLET | Refills: 0 | Status: SHIPPED | OUTPATIENT
Start: 2023-08-16

## 2023-08-16 RX ORDER — LOSARTAN POTASSIUM 50 MG/1
50 TABLET ORAL
Qty: 90 TABLET | Refills: 0 | Status: SHIPPED | OUTPATIENT
Start: 2023-08-16

## 2023-08-16 RX ORDER — TERAZOSIN 1 MG/1
1 CAPSULE ORAL NIGHTLY
Qty: 30 CAPSULE | Refills: 0 | Status: SHIPPED | OUTPATIENT
Start: 2023-08-16

## 2023-08-16 RX ORDER — GABAPENTIN 100 MG/1
100 CAPSULE ORAL 3 TIMES DAILY
Qty: 90 CAPSULE | Refills: 2 | OUTPATIENT
Start: 2023-08-16

## 2023-08-16 NOTE — TELEPHONE ENCOUNTER
She saw a different provider, Tessy DENISE. Followed up with Tessy on results and she has spoken with patient and plans to follow up again with her this week.

## 2023-08-16 NOTE — TELEPHONE ENCOUNTER
"    Caller: Thelma Michael    Relationship: Self    Best call back number: [phone]    Requested Prescriptions:   Requested Prescriptions     Pending Prescriptions Disp Refills    Multivitamin tablet tablet 30 tablet 0     Sig: Take 1 tablet by mouth Daily.    terazosin (HYTRIN) 1 MG capsule 30 capsule 0     Sig: Take 1 capsule by mouth Every Night.    gabapentin (NEURONTIN) 100 MG capsule 90 capsule 2     Sig: Take 1 capsule by mouth 3 (Three) Times a Day.    losartan (COZAAR) 50 MG tablet 30 tablet 0     Sig: Take 1 tablet by mouth Daily.        Pharmacy where request should be sent: [address] [phone] [facility] [phone] [facility]     Last office visit with prescribing clinician: 6/26/2023   Last telemedicine visit with prescribing clinician: Visit date not found   Next office visit with prescribing clinician: 9/26/2023   Additional details provided by patient: THE PATIENT STATES THAT SHE HAS A WEEK LEFT OF THE MEDICATION     Does the patient have less than a 3 day supply:  [] Yes  [x] No    Would you like a call back once the refill request has been completed: [] Yes [x] No    If the office needs to give you a call back, can they leave a voicemail: [] Yes [x] No    Nicol Arriaga Rep   08/16/23 09:19 EDT         DELETE AFTER READING TO PATIENT: "Thank you for sharing this information with me. I will send a message to the clinical team. Please allow 48 hours for the clinical staff to follow up on this request."    "

## 2023-08-16 NOTE — TELEPHONE ENCOUNTER
Losartan not rx'd by.       Attempted to call pt to let her know she should have another refill of gabapentin. No answer no vm.

## 2023-09-01 ENCOUNTER — APPOINTMENT (OUTPATIENT)
Dept: GENERAL RADIOLOGY | Facility: HOSPITAL | Age: 63
End: 2023-09-01
Payer: COMMERCIAL

## 2023-09-01 ENCOUNTER — HOSPITAL ENCOUNTER (EMERGENCY)
Facility: HOSPITAL | Age: 63
Discharge: HOME | End: 2023-09-01
Attending: EMERGENCY MEDICINE
Payer: COMMERCIAL

## 2023-09-01 VITALS
WEIGHT: 139 LBS | TEMPERATURE: 98.5 F | OXYGEN SATURATION: 95 % | BODY MASS INDEX: 22.34 KG/M2 | RESPIRATION RATE: 20 BRPM | SYSTOLIC BLOOD PRESSURE: 198 MMHG | DIASTOLIC BLOOD PRESSURE: 89 MMHG | HEIGHT: 66 IN | HEART RATE: 87 BPM

## 2023-09-01 DIAGNOSIS — I10 ELEVATED BLOOD PRESSURE READING IN OFFICE WITH DIAGNOSIS OF HYPERTENSION: ICD-10-CM

## 2023-09-01 DIAGNOSIS — S42.032A TRAUMATIC CLOSED FRACTURE OF DISTAL CLAVICLE WITH MINIMAL DISPLACEMENT, LEFT, INITIAL ENCOUNTER: Primary | ICD-10-CM

## 2023-09-01 PROCEDURE — 99283 EMERGENCY DEPT VISIT LOW MDM: CPT

## 2023-09-01 PROCEDURE — 73060 X-RAY EXAM OF HUMERUS: CPT

## 2023-09-01 PROCEDURE — 73030 X-RAY EXAM OF SHOULDER: CPT

## 2023-09-01 PROCEDURE — 71045 X-RAY EXAM CHEST 1 VIEW: CPT

## 2023-09-01 RX ORDER — HYDROCODONE BITARTRATE AND ACETAMINOPHEN 5; 325 MG/1; MG/1
1 TABLET ORAL EVERY 6 HOURS PRN
Qty: 12 TABLET | Refills: 0 | Status: ON HOLD | OUTPATIENT
Start: 2023-09-01

## 2023-09-01 RX ORDER — HYDROCODONE BITARTRATE AND ACETAMINOPHEN 5; 325 MG/1; MG/1
1 TABLET ORAL ONCE
Status: COMPLETED | OUTPATIENT
Start: 2023-09-01 | End: 2023-09-01

## 2023-09-01 RX ADMIN — HYDROCODONE BITARTRATE AND ACETAMINOPHEN 1 TABLET: 5; 325 TABLET ORAL at 14:17

## 2023-09-01 NOTE — ED PROVIDER NOTES
Subjective   History of Present Illness  62-year-old female presents for evaluation of left shoulder injury.  She states that 2 days ago she sustained an injury to her left shoulder after a mechanical trip and fall while at [place], [place].  She notes that since that time she has been experiencing significant pain with limited range of motion to her left shoulder so she came here to the emergency department today upon returning home to [place].  Isolated injury.  She currently rates her pain at 8 out of 10 in severity.  She did not lose consciousness.  No neck pain.  She is right-handed.  Aside from her shoulder pain, she has no other complaints at this time.    Review of Systems   Musculoskeletal:         Left shoulder pain   All other systems reviewed and are negative.    Past Medical History:   Diagnosis Date    Acid reflux     Acute bronchitis     Cardiac murmur     Depression with anxiety 10/5/2020    Diabetes mellitus     Gastroesophageal reflux disease 5/13/2016    H/O echocardiogram 08/07/2012    i. LVEF 65%.ii. Mild LVH.iii. Borderline evidence of atrial septal aneurysm.  No PFO.     History of nuclear stress test 08/22/2014    Negative for ischemia and scars; LVEF 77%.      History of TIAs 7/15/2021    Hyperlipidemia     Hypertension     Impacted cerumen of both ears     Migraine     Migraines 10/31/2019    Self-catheterizes urinary bladder     Sinusitis     Stroke     Tobacco abuse     quit 4 days ago.      Urticaria     Vitamin D deficiency 5/13/2016       No Known Allergies    Past Surgical History:   Procedure Laterality Date    CAPSULE ENDOSCOPY  07/27/2021    Procedure: PILLCAM DEPLOYMENT;  Surgeon: Mikael Worthy MD;  Location:  [facility];  Service: Gastroenterology;;    COLONOSCOPY      COLONOSCOPY N/A 07/27/2021    Procedure: COLONOSCOPY;  Surgeon: Mikael Worthy MD;  Location:  [facility];  Service: Gastroenterology;  Laterality: N/A;    DENTAL PROCEDURE       ENDOSCOPY N/A 2021    Procedure: ESOPHAGOGASTRODUODENOSCOPY;  Surgeon: Brunner, Mark I, MD;  Location:  [facility];  Service: Gastroenterology;  Laterality: N/A;    ENDOSCOPY N/A 2021    Procedure: ESOPHAGOGASTRODUODENOSCOPY;  Surgeon: Mikael Worthy MD;  Location:  [facility];  Service: Gastroenterology;  Laterality: N/A;    ENDOSCOPY WITH JTUBE N/A 2022    Procedure: ESOPHAGOGASTRODUODENOSCOPY WITH JEJUNAL TUBE INSERTION;  Surgeon: Thom Glover MD;  Location:  [facility];  Service: Gastroenterology;  Laterality: N/A;    UPPER GASTROINTESTINAL ENDOSCOPY         Family History   Problem Relation Age of Onset    Anxiety disorder Other     Arthritis Other     ADD / ADHD Other     Heart disease Other         cardiac disorder    Depression Other     Diabetes Other     Hyperlipidemia Other     Hypertension Other     Lung cancer Other     Osteoporosis Other     Hypertension Brother     Diabetes Brother     Hyperlipidemia Mother     Hypertension Mother     Colon cancer Neg Hx        Social History     Socioeconomic History    Marital status:    Tobacco Use    Smoking status: Former     Packs/day: 0.25     Years: 30.00     Pack years: 7.50     Types: Cigarettes     Quit date: 2019     Years since quittin.2    Smokeless tobacco: Never    Tobacco comments:     BC PL never smoker    Vaping Use    Vaping Use: Never used   Substance and Sexual Activity    Alcohol use: Yes     Alcohol/week: 1.0 standard drink     Types: 1 Glasses of wine per week     Comment: ocassional    Drug use: Yes     Frequency: 2.0 times per week     Types: Marijuana    Sexual activity: Not Currently     Comment: Single            Objective   Physical Exam  Vitals and nursing note reviewed.   Constitutional:       General: She is not in acute distress.     Appearance: She is well-developed. She is not diaphoretic.      Comments: Nontoxic-appearing female   HENT:      Head: Normocephalic and  atraumatic.   Neck:      Comments: No midline cervical spine tenderness noted, no step-off or deformity present  Cardiovascular:      Rate and Rhythm: Normal rate and regular rhythm.      Heart sounds: Normal heart sounds. No murmur heard.    No friction rub. No gallop.   Pulmonary:      Effort: Pulmonary effort is normal. No respiratory distress.      Breath sounds: Normal breath sounds. No wheezing or rales.   Abdominal:      General: Bowel sounds are normal. There is no distension.      Palpations: Abdomen is soft. There is no mass.      Tenderness: There is no abdominal tenderness. There is no guarding or rebound.   Musculoskeletal:      Comments: Range of motion of left shoulder is painful and limited secondary to pain   Skin:     General: Skin is warm and dry.      Findings: No erythema or rash.   Neurological:      Mental Status: She is alert and oriented to person, place, and time.      Comments: Left upper extremity is neurovascularly intact distally with bounding distal pulses normal sensation noted, normal  strength noted   Psychiatric:         Mood and Affect: Mood normal.         Thought Content: Thought content normal.         Judgment: Judgment normal.       Procedures           ED Course  ED Course as of 09/01/23 1441   Fri Sep 01, 2023   1413 62-year-old female presents for evaluation of left shoulder injury.  She states that 2 days ago she sustained an injury to her left shoulder after mechanical trip and fall while at [place], [place].  She notes that since that time she has been experiencing significant pain with limited range of motion to her left shoulder so she came here to the emergency department today upon returning home to [place].  Isolated injury.  She did not hit her head.  No neck pain.  She is right-handed.  Left upper extremity is neurovascularly intact.  Range of motion of left shoulder is limited secondary to pain.  Pain control provided.  We will obtain plain films,  and we will reassess following initial interventions. [DD]   1434 I personally and independently viewed the patient's x-ray images myself, and I am in agreement with the radiologist's reading for final interpretation--particularly regarding distal clavicle fracture. [DD]   1434 Patient reassured.  Sling placed.  Neurovascularly intact.  Short course of Norco given for pain control.  I referred the patient to Dr. Malagon of orthopedics and she will follow-up within the next week.  Agreeable with plan and given appropriate strict return precautions. [DD]   1434 The patient's blood pressure was elevated in the emergency department.  The patient is currently asymptomatic from a blood pressure standpoint, and their clinical presentation clearly is not consistent with hypertensive emergency or hypertensive encephalopathy.  No indication to emergently lower the patient's blood pressure at this time per [facility]'s clinical policy on asymptomatic hypertension.  Over the next 3 days, the patient will keep close tabs on their blood pressure and will keep a log of readings 2-3 times per day.  They will then follow-up with their primary care physician to discuss potential tweaks to their medication regimen.     [DD]      ED Course User Index  [DD] Pedro Rodríguez MD              No results found for this or any previous visit (from the past 24 hour(s)).  Note: In addition to lab results from this visit, the labs listed above may include labs taken at another facility or during a different encounter within the last 24 hours. Please correlate lab times with ED admission and discharge times for further clarification of the services performed during this visit.    XR Chest 1 View   Final Result   Impression:      1. No active cardiopulmonary disease   2. Distal left clavicle fracture         Electronically Signed: Joey Thorpe     9/1/2023 2:42 PM EDT     Workstation ID: [id number]      XR Humerus Left   Final Result   Impression:     "  1. Distal clavicle fracture with elevation of the proximal segment and cortical avulsions off the coracoid   2. Negative for humerus fracture or dislocation         Electronically Signed: Joey Thorpe     9/1/2023 2:40 PM EDT     Workstation ID: [id number]      XR Shoulder 2+ View Left   Final Result   Impression:      1. Distal clavicle fracture with elevation of the proximal segment and cortical avulsions off the coracoid   2. Negative for humerus fracture or dislocation         Electronically Signed: Joey Thorpe     9/1/2023 2:40 PM EDT     Workstation ID: [id number]        Vitals:    09/01/23 1410 09/01/23 1432   BP: (!) 204/101 (!) 198/89   BP Location: Right arm    Patient Position: Sitting    Pulse: 94 87   Resp: 20    Temp: 98.5 °F (36.9 °C)    TempSrc: Oral    SpO2: 100% 95%   Weight: 63 kg (139 lb)    Height: 167.6 cm (66\")      Medications   HYDROcodone-acetaminophen (NORCO) 5-325 MG per tablet 1 tablet (1 tablet Oral Given 9/1/23 1417)     ECG/EMG Results (last 24 hours)       ** No results found for the last 24 hours. **          No orders to display                            SULMA reviewed by Pedro Rodríguez MD       Medical Decision Making  Problems Addressed:  Elevated blood pressure reading in office with diagnosis of hypertension: complicated acute illness or injury  Traumatic closed fracture of distal clavicle with minimal displacement, left, initial encounter: complicated acute illness or injury    Amount and/or Complexity of Data Reviewed  Radiology: ordered.    Risk  Prescription drug management.        Final diagnoses:   Traumatic closed fracture of distal clavicle with minimal displacement, left, initial encounter   Elevated blood pressure reading in office with diagnosis of hypertension       ED Disposition  ED Disposition       ED Disposition   Discharge    Condition   Stable    Comment   --               Silver Malagon MD  [address]  [address]  [facility] " [id number]  [phone]    In 1 week           Medication List        New Prescriptions      HYDROcodone-acetaminophen 5-325 MG per tablet  Commonly known as: NORCO  Take 1 tablet by mouth Every 6 (Six) Hours As Needed for Moderate Pain.               Where to Get Your Medications        These medications were sent to [facility] [address] [place] [address] - [address] [place] [address] - [phone]  - [phone] [address] [address]      Phone: [phone]   HYDROcodone-acetaminophen 5-325 MG per tablet            Pedro Rodríguez MD  09/01/23 3334

## 2023-09-01 NOTE — DISCHARGE INSTRUCTIONS
The patient's blood pressure was elevated in the emergency department.  The patient is currently asymptomatic from a blood pressure standpoint, and their clinical presentation clearly is not consistent with hypertensive emergency or hypertensive encephalopathy.  No indication to emergently lower the patient's blood pressure at this time per [facility]'s clinical policy on asymptomatic hypertension.  Over the next 3 days, the patient will keep close tabs on their blood pressure and will keep a log of readings 2-3 times per day.  They will then follow-up with their primary care physician to discuss potential tweaks to their medication regimen.

## 2023-09-05 ENCOUNTER — HOSPITAL ENCOUNTER (INPATIENT)
Facility: HOSPITAL | Age: 63
LOS: 12 days | Discharge: NURSING, REHAB OR LONG-TERM CARE | DRG: 304 | End: 2023-09-18
Attending: EMERGENCY MEDICINE | Admitting: FAMILY MEDICINE
Payer: COMMERCIAL

## 2023-09-05 ENCOUNTER — APPOINTMENT (OUTPATIENT)
Dept: CT IMAGING | Facility: HOSPITAL | Age: 63
DRG: 304 | End: 2023-09-05
Payer: COMMERCIAL

## 2023-09-05 DIAGNOSIS — K29.00 ACUTE SUPERFICIAL GASTRITIS WITHOUT HEMORRHAGE: ICD-10-CM

## 2023-09-05 DIAGNOSIS — N17.9 ACUTE KIDNEY INJURY: ICD-10-CM

## 2023-09-05 DIAGNOSIS — R73.9 HYPERGLYCEMIA: ICD-10-CM

## 2023-09-05 DIAGNOSIS — S42.009A CLOSED NONDISPLACED FRACTURE OF CLAVICLE, UNSPECIFIED LATERALITY, UNSPECIFIED PART OF CLAVICLE, INITIAL ENCOUNTER: ICD-10-CM

## 2023-09-05 DIAGNOSIS — I16.0 HYPERTENSIVE URGENCY: Primary | ICD-10-CM

## 2023-09-05 LAB
BACTERIA UR QL AUTO: ABNORMAL /HPF
BASOPHILS # BLD AUTO: 0.01 10*3/MM3 (ref 0–0.2)
BASOPHILS NFR BLD AUTO: 0.1 % (ref 0–1.5)
BILIRUB UR QL STRIP: NEGATIVE
CLARITY UR: ABNORMAL
COLOR UR: YELLOW
DEPRECATED RDW RBC AUTO: 43.4 FL (ref 37–54)
EOSINOPHIL # BLD AUTO: 0 10*3/MM3 (ref 0–0.4)
EOSINOPHIL NFR BLD AUTO: 0 % (ref 0.3–6.2)
ERYTHROCYTE [DISTWIDTH] IN BLOOD BY AUTOMATED COUNT: 13.4 % (ref 12.3–15.4)
GLUCOSE UR STRIP-MCNC: ABNORMAL MG/DL
HCT VFR BLD AUTO: 39.1 % (ref 34–46.6)
HGB BLD-MCNC: 13 G/DL (ref 12–15.9)
HGB UR QL STRIP.AUTO: ABNORMAL
HYALINE CASTS UR QL AUTO: ABNORMAL /LPF
IMM GRANULOCYTES # BLD AUTO: 0.02 10*3/MM3 (ref 0–0.05)
IMM GRANULOCYTES NFR BLD AUTO: 0.3 % (ref 0–0.5)
KETONES UR QL STRIP: ABNORMAL
LEUKOCYTE ESTERASE UR QL STRIP.AUTO: ABNORMAL
LYMPHOCYTES # BLD AUTO: 0.83 10*3/MM3 (ref 0.7–3.1)
LYMPHOCYTES NFR BLD AUTO: 11.4 % (ref 19.6–45.3)
MCH RBC QN AUTO: 28.9 PG (ref 26.6–33)
MCHC RBC AUTO-ENTMCNC: 33.2 G/DL (ref 31.5–35.7)
MCV RBC AUTO: 86.9 FL (ref 79–97)
MONOCYTES # BLD AUTO: 0.29 10*3/MM3 (ref 0.1–0.9)
MONOCYTES NFR BLD AUTO: 4 % (ref 5–12)
NEUTROPHILS NFR BLD AUTO: 6.14 10*3/MM3 (ref 1.7–7)
NEUTROPHILS NFR BLD AUTO: 84.2 % (ref 42.7–76)
NITRITE UR QL STRIP: NEGATIVE
NRBC BLD AUTO-RTO: 0 /100 WBC (ref 0–0.2)
PH UR STRIP.AUTO: 6 [PH] (ref 5–8)
PLATELET # BLD AUTO: 404 10*3/MM3 (ref 140–450)
PMV BLD AUTO: 9.6 FL (ref 6–12)
PROT UR QL STRIP: ABNORMAL
RBC # BLD AUTO: 4.5 10*6/MM3 (ref 3.77–5.28)
RBC # UR STRIP: ABNORMAL /HPF
REF LAB TEST METHOD: ABNORMAL
SP GR UR STRIP: 1.02 (ref 1–1.03)
SQUAMOUS #/AREA URNS HPF: ABNORMAL /HPF
TRANS CELLS #/AREA URNS HPF: ABNORMAL /HPF
UROBILINOGEN UR QL STRIP: ABNORMAL
WBC # UR STRIP: ABNORMAL /HPF
WBC NRBC COR # BLD: 7.29 10*3/MM3 (ref 3.4–10.8)

## 2023-09-05 PROCEDURE — 83690 ASSAY OF LIPASE: CPT | Performed by: EMERGENCY MEDICINE

## 2023-09-05 PROCEDURE — 82010 KETONE BODYS QUAN: CPT | Performed by: EMERGENCY MEDICINE

## 2023-09-05 PROCEDURE — 81001 URINALYSIS AUTO W/SCOPE: CPT | Performed by: EMERGENCY MEDICINE

## 2023-09-05 PROCEDURE — 87086 URINE CULTURE/COLONY COUNT: CPT | Performed by: EMERGENCY MEDICINE

## 2023-09-05 PROCEDURE — 25010000002 ONDANSETRON PER 1 MG: Performed by: EMERGENCY MEDICINE

## 2023-09-05 PROCEDURE — 99285 EMERGENCY DEPT VISIT HI MDM: CPT

## 2023-09-05 PROCEDURE — 80053 COMPREHEN METABOLIC PANEL: CPT | Performed by: EMERGENCY MEDICINE

## 2023-09-05 PROCEDURE — 84145 PROCALCITONIN (PCT): CPT | Performed by: NURSE PRACTITIONER

## 2023-09-05 PROCEDURE — 87077 CULTURE AEROBIC IDENTIFY: CPT | Performed by: EMERGENCY MEDICINE

## 2023-09-05 PROCEDURE — 85025 COMPLETE CBC W/AUTO DIFF WBC: CPT | Performed by: EMERGENCY MEDICINE

## 2023-09-05 PROCEDURE — 87186 SC STD MICRODIL/AGAR DIL: CPT | Performed by: EMERGENCY MEDICINE

## 2023-09-05 PROCEDURE — 25010000002 DIPHENHYDRAMINE PER 50 MG: Performed by: EMERGENCY MEDICINE

## 2023-09-05 RX ORDER — ONDANSETRON 2 MG/ML
4 INJECTION INTRAMUSCULAR; INTRAVENOUS ONCE
Status: COMPLETED | OUTPATIENT
Start: 2023-09-05 | End: 2023-09-05

## 2023-09-05 RX ORDER — DIPHENHYDRAMINE HYDROCHLORIDE 50 MG/ML
25 INJECTION INTRAMUSCULAR; INTRAVENOUS ONCE
Status: COMPLETED | OUTPATIENT
Start: 2023-09-05 | End: 2023-09-05

## 2023-09-05 RX ORDER — SODIUM CHLORIDE 0.9 % (FLUSH) 0.9 %
10 SYRINGE (ML) INJECTION AS NEEDED
Status: DISCONTINUED | OUTPATIENT
Start: 2023-09-05 | End: 2023-09-18 | Stop reason: HOSPADM

## 2023-09-05 RX ADMIN — DIPHENHYDRAMINE HYDROCHLORIDE 25 MG: 50 INJECTION INTRAMUSCULAR; INTRAVENOUS at 22:00

## 2023-09-05 RX ADMIN — SODIUM CHLORIDE 1000 ML: 9 INJECTION, SOLUTION INTRAVENOUS at 23:57

## 2023-09-05 RX ADMIN — ONDANSETRON 4 MG: 2 INJECTION INTRAMUSCULAR; INTRAVENOUS at 21:55

## 2023-09-05 NOTE — Clinical Note
Level of Care: Telemetry [5]   Diagnosis: Hypertensive urgency [737311]   Admitting Physician: WHIT BELTRAN III [[id number]]   Attending Physician: WHIT EBLTRAN III [[id number]]   Bed Request Comments: tele inpt

## 2023-09-06 ENCOUNTER — APPOINTMENT (OUTPATIENT)
Dept: GENERAL RADIOLOGY | Facility: HOSPITAL | Age: 63
DRG: 304 | End: 2023-09-06
Payer: COMMERCIAL

## 2023-09-06 ENCOUNTER — APPOINTMENT (OUTPATIENT)
Dept: CT IMAGING | Facility: HOSPITAL | Age: 63
DRG: 304 | End: 2023-09-06
Payer: COMMERCIAL

## 2023-09-06 PROBLEM — N39.0 ACUTE UTI (URINARY TRACT INFECTION): Status: ACTIVE | Noted: 2023-09-06

## 2023-09-06 PROBLEM — R19.7 NAUSEA VOMITING AND DIARRHEA: Status: ACTIVE | Noted: 2023-09-06

## 2023-09-06 PROBLEM — S42.009A CLAVICLE FRACTURE: Status: ACTIVE | Noted: 2023-09-06

## 2023-09-06 PROBLEM — N18.2 CKD (CHRONIC KIDNEY DISEASE) STAGE 2, GFR 60-89 ML/MIN: Status: ACTIVE | Noted: 2023-09-06

## 2023-09-06 PROBLEM — R11.2 NAUSEA VOMITING AND DIARRHEA: Status: ACTIVE | Noted: 2023-09-06

## 2023-09-06 PROBLEM — I48.0 PAF (PAROXYSMAL ATRIAL FIBRILLATION): Status: ACTIVE | Noted: 2023-09-06

## 2023-09-06 PROBLEM — F41.8 ANXIETY ASSOCIATED WITH DEPRESSION: Status: ACTIVE | Noted: 2023-09-06

## 2023-09-06 LAB
ALBUMIN SERPL-MCNC: 4 G/DL (ref 3.5–5.2)
ALBUMIN/GLOB SERPL: 1 G/DL
ALP SERPL-CCNC: 111 U/L (ref 39–117)
ALT SERPL W P-5'-P-CCNC: 13 U/L (ref 1–33)
ANION GAP SERPL CALCULATED.3IONS-SCNC: 15 MMOL/L (ref 5–15)
ANION GAP SERPL CALCULATED.3IONS-SCNC: 22 MMOL/L (ref 5–15)
ARTERIAL PATENCY WRIST A: ABNORMAL
AST SERPL-CCNC: 36 U/L (ref 1–32)
ATMOSPHERIC PRESS: ABNORMAL MM[HG]
B PARAPERT DNA SPEC QL NAA+PROBE: NOT DETECTED
B PERT DNA SPEC QL NAA+PROBE: NOT DETECTED
B-OH-BUTYR SERPL-SCNC: 3.89 MMOL/L (ref 0.02–0.27)
BASE EXCESS BLDA CALC-SCNC: -1.9 MMOL/L (ref 0–2)
BASOPHILS # BLD AUTO: 0.01 10*3/MM3 (ref 0–0.2)
BASOPHILS NFR BLD AUTO: 0.2 % (ref 0–1.5)
BDY SITE: ABNORMAL
BILIRUB SERPL-MCNC: 0.5 MG/DL (ref 0–1.2)
BODY TEMPERATURE: 37 C
BUN SERPL-MCNC: 23 MG/DL (ref 8–23)
BUN SERPL-MCNC: 27 MG/DL (ref 8–23)
BUN/CREAT SERPL: 16.8 (ref 7–25)
BUN/CREAT SERPL: 18.9 (ref 7–25)
C PNEUM DNA NPH QL NAA+NON-PROBE: NOT DETECTED
CALCIUM SPEC-SCNC: 8.4 MG/DL (ref 8.6–10.5)
CALCIUM SPEC-SCNC: 9.1 MG/DL (ref 8.6–10.5)
CHLORIDE SERPL-SCNC: 103 MMOL/L (ref 98–107)
CHLORIDE SERPL-SCNC: 110 MMOL/L (ref 98–107)
CO2 BLDA-SCNC: 22.6 MMOL/L (ref 22–33)
CO2 SERPL-SCNC: 19 MMOL/L (ref 22–29)
CO2 SERPL-SCNC: 21 MMOL/L (ref 22–29)
COHGB MFR BLD: 0.9 % (ref 0–2)
CREAT SERPL-MCNC: 1.22 MG/DL (ref 0.57–1)
CREAT SERPL-MCNC: 1.61 MG/DL (ref 0.57–1)
D-LACTATE SERPL-SCNC: 1 MMOL/L (ref 0.5–2)
DEPRECATED RDW RBC AUTO: 44 FL (ref 37–54)
EGFRCR SERPLBLD CKD-EPI 2021: 35.6 ML/MIN/1.73
EGFRCR SERPLBLD CKD-EPI 2021: 49.7 ML/MIN/1.73
EOSINOPHIL # BLD AUTO: 0 10*3/MM3 (ref 0–0.4)
EOSINOPHIL NFR BLD AUTO: 0 % (ref 0.3–6.2)
EPAP: 0
ERYTHROCYTE [DISTWIDTH] IN BLOOD BY AUTOMATED COUNT: 13.7 % (ref 12.3–15.4)
FLUAV SUBTYP SPEC NAA+PROBE: NOT DETECTED
FLUBV RNA ISLT QL NAA+PROBE: NOT DETECTED
GLOBULIN UR ELPH-MCNC: 4.2 GM/DL
GLUCOSE BLDC GLUCOMTR-MCNC: 128 MG/DL (ref 70–130)
GLUCOSE BLDC GLUCOMTR-MCNC: 150 MG/DL (ref 70–130)
GLUCOSE BLDC GLUCOMTR-MCNC: 159 MG/DL (ref 70–130)
GLUCOSE BLDC GLUCOMTR-MCNC: 174 MG/DL (ref 70–130)
GLUCOSE BLDC GLUCOMTR-MCNC: 280 MG/DL (ref 70–130)
GLUCOSE SERPL-MCNC: 162 MG/DL (ref 65–99)
GLUCOSE SERPL-MCNC: 316 MG/DL (ref 65–99)
HADV DNA SPEC NAA+PROBE: NOT DETECTED
HBA1C MFR BLD: 11.1 % (ref 4.8–5.6)
HCO3 BLDA-SCNC: 21.7 MMOL/L (ref 20–26)
HCOV 229E RNA SPEC QL NAA+PROBE: NOT DETECTED
HCOV HKU1 RNA SPEC QL NAA+PROBE: NOT DETECTED
HCOV NL63 RNA SPEC QL NAA+PROBE: NOT DETECTED
HCOV OC43 RNA SPEC QL NAA+PROBE: NOT DETECTED
HCT VFR BLD AUTO: 34.6 % (ref 34–46.6)
HCT VFR BLD CALC: 35.9 % (ref 38–51)
HGB BLD-MCNC: 11.5 G/DL (ref 12–15.9)
HGB BLDA-MCNC: 11.7 G/DL (ref 14–18)
HMPV RNA NPH QL NAA+NON-PROBE: NOT DETECTED
HPIV1 RNA ISLT QL NAA+PROBE: NOT DETECTED
HPIV2 RNA SPEC QL NAA+PROBE: NOT DETECTED
HPIV3 RNA NPH QL NAA+PROBE: NOT DETECTED
HPIV4 P GENE NPH QL NAA+PROBE: NOT DETECTED
IMM GRANULOCYTES # BLD AUTO: 0.05 10*3/MM3 (ref 0–0.05)
IMM GRANULOCYTES NFR BLD AUTO: 1 % (ref 0–0.5)
INHALED O2 CONCENTRATION: 21 %
IPAP: 0
LIPASE SERPL-CCNC: 26 U/L (ref 13–60)
LYMPHOCYTES # BLD AUTO: 1.23 10*3/MM3 (ref 0.7–3.1)
LYMPHOCYTES NFR BLD AUTO: 25.4 % (ref 19.6–45.3)
M PNEUMO IGG SER IA-ACNC: NOT DETECTED
MCH RBC QN AUTO: 29.4 PG (ref 26.6–33)
MCHC RBC AUTO-ENTMCNC: 33.2 G/DL (ref 31.5–35.7)
MCV RBC AUTO: 88.5 FL (ref 79–97)
METHGB BLD QL: 0.3 % (ref 0–1.5)
MODALITY: ABNORMAL
MONOCYTES # BLD AUTO: 0.82 10*3/MM3 (ref 0.1–0.9)
MONOCYTES NFR BLD AUTO: 16.9 % (ref 5–12)
NEUTROPHILS NFR BLD AUTO: 2.73 10*3/MM3 (ref 1.7–7)
NEUTROPHILS NFR BLD AUTO: 56.5 % (ref 42.7–76)
NRBC BLD AUTO-RTO: 0 /100 WBC (ref 0–0.2)
OXYHGB MFR BLDV: 95.8 % (ref 94–99)
PAW @ PEAK INSP FLOW SETTING VENT: 0 CMH2O
PCO2 BLDA: 32.2 MM HG (ref 35–45)
PCO2 TEMP ADJ BLD: 32.2 MM HG (ref 35–45)
PH BLDA: 7.44 PH UNITS (ref 7.35–7.45)
PH, TEMP CORRECTED: 7.44 PH UNITS
PLAT MORPH BLD: NORMAL
PLATELET # BLD AUTO: 349 10*3/MM3 (ref 140–450)
PMV BLD AUTO: 9.6 FL (ref 6–12)
PO2 BLDA: 83.3 MM HG (ref 83–108)
PO2 TEMP ADJ BLD: 83.3 MM HG (ref 83–108)
POTASSIUM SERPL-SCNC: 3.2 MMOL/L (ref 3.5–5.2)
POTASSIUM SERPL-SCNC: 3.3 MMOL/L (ref 3.5–5.2)
POTASSIUM SERPL-SCNC: 3.7 MMOL/L (ref 3.5–5.2)
PROCALCITONIN SERPL-MCNC: 0.17 NG/ML (ref 0–0.25)
PROT SERPL-MCNC: 8.2 G/DL (ref 6–8.5)
QT INTERVAL: 452 MS
QTC INTERVAL: 534 MS
RBC # BLD AUTO: 3.91 10*6/MM3 (ref 3.77–5.28)
RBC MORPH BLD: NORMAL
RHINOVIRUS RNA SPEC NAA+PROBE: NOT DETECTED
RSV RNA NPH QL NAA+NON-PROBE: NOT DETECTED
SARS-COV-2 RNA NPH QL NAA+NON-PROBE: NOT DETECTED
SODIUM SERPL-SCNC: 144 MMOL/L (ref 136–145)
SODIUM SERPL-SCNC: 146 MMOL/L (ref 136–145)
TOTAL RATE: 0 BREATHS/MINUTE
WBC MORPH BLD: NORMAL
WBC NRBC COR # BLD: 4.84 10*3/MM3 (ref 3.4–10.8)

## 2023-09-06 PROCEDURE — 93010 ELECTROCARDIOGRAM REPORT: CPT | Performed by: INTERNAL MEDICINE

## 2023-09-06 PROCEDURE — 80048 BASIC METABOLIC PNL TOTAL CA: CPT | Performed by: NURSE PRACTITIONER

## 2023-09-06 PROCEDURE — 99223 1ST HOSP IP/OBS HIGH 75: CPT | Performed by: INTERNAL MEDICINE

## 2023-09-06 PROCEDURE — G0378 HOSPITAL OBSERVATION PER HR: HCPCS

## 2023-09-06 PROCEDURE — 36600 WITHDRAWAL OF ARTERIAL BLOOD: CPT

## 2023-09-06 PROCEDURE — 63710000001 INSULIN DETEMIR PER 5 UNITS: Performed by: STUDENT IN AN ORGANIZED HEALTH CARE EDUCATION/TRAINING PROGRAM

## 2023-09-06 PROCEDURE — 82375 ASSAY CARBOXYHB QUANT: CPT

## 2023-09-06 PROCEDURE — 82948 REAGENT STRIP/BLOOD GLUCOSE: CPT

## 2023-09-06 PROCEDURE — 63710000001 ONDANSETRON PER 8 MG: Performed by: NURSE PRACTITIONER

## 2023-09-06 PROCEDURE — 85025 COMPLETE CBC W/AUTO DIFF WBC: CPT | Performed by: NURSE PRACTITIONER

## 2023-09-06 PROCEDURE — 87147 CULTURE TYPE IMMUNOLOGIC: CPT | Performed by: NURSE PRACTITIONER

## 2023-09-06 PROCEDURE — 25010000002 CEFTRIAXONE PER 250 MG: Performed by: NURSE PRACTITIONER

## 2023-09-06 PROCEDURE — 84132 ASSAY OF SERUM POTASSIUM: CPT | Performed by: STUDENT IN AN ORGANIZED HEALTH CARE EDUCATION/TRAINING PROGRAM

## 2023-09-06 PROCEDURE — 83050 HGB METHEMOGLOBIN QUAN: CPT

## 2023-09-06 PROCEDURE — 25010000002 HYDRALAZINE PER 20 MG: Performed by: EMERGENCY MEDICINE

## 2023-09-06 PROCEDURE — 93005 ELECTROCARDIOGRAM TRACING: CPT | Performed by: NURSE PRACTITIONER

## 2023-09-06 PROCEDURE — 0202U NFCT DS 22 TRGT SARS-COV-2: CPT | Performed by: NURSE PRACTITIONER

## 2023-09-06 PROCEDURE — 71045 X-RAY EXAM CHEST 1 VIEW: CPT

## 2023-09-06 PROCEDURE — 87040 BLOOD CULTURE FOR BACTERIA: CPT | Performed by: NURSE PRACTITIONER

## 2023-09-06 PROCEDURE — 74176 CT ABD & PELVIS W/O CONTRAST: CPT

## 2023-09-06 PROCEDURE — 83605 ASSAY OF LACTIC ACID: CPT | Performed by: NURSE PRACTITIONER

## 2023-09-06 PROCEDURE — 63710000001 INSULIN REGULAR HUMAN PER 5 UNITS: Performed by: EMERGENCY MEDICINE

## 2023-09-06 PROCEDURE — 85007 BL SMEAR W/DIFF WBC COUNT: CPT | Performed by: NURSE PRACTITIONER

## 2023-09-06 PROCEDURE — 87150 DNA/RNA AMPLIFIED PROBE: CPT | Performed by: NURSE PRACTITIONER

## 2023-09-06 PROCEDURE — 36415 COLL VENOUS BLD VENIPUNCTURE: CPT

## 2023-09-06 PROCEDURE — 83036 HEMOGLOBIN GLYCOSYLATED A1C: CPT | Performed by: NURSE PRACTITIONER

## 2023-09-06 PROCEDURE — 63710000001 INSULIN LISPRO (HUMAN) PER 5 UNITS: Performed by: NURSE PRACTITIONER

## 2023-09-06 PROCEDURE — 82805 BLOOD GASES W/O2 SATURATION: CPT

## 2023-09-06 PROCEDURE — 0 POTASSIUM CHLORIDE 10 MEQ/100ML SOLUTION: Performed by: NURSE PRACTITIONER

## 2023-09-06 RX ORDER — TERAZOSIN 1 MG/1
1 CAPSULE ORAL NIGHTLY
Status: DISCONTINUED | OUTPATIENT
Start: 2023-09-06 | End: 2023-09-18 | Stop reason: HOSPADM

## 2023-09-06 RX ORDER — POTASSIUM CHLORIDE 20 MEQ/1
40 TABLET, EXTENDED RELEASE ORAL EVERY 4 HOURS
Status: DISCONTINUED | OUTPATIENT
Start: 2023-09-06 | End: 2023-09-06

## 2023-09-06 RX ORDER — CHOLECALCIFEROL (VITAMIN D3) 125 MCG
5 CAPSULE ORAL NIGHTLY PRN
Status: DISCONTINUED | OUTPATIENT
Start: 2023-09-06 | End: 2023-09-18 | Stop reason: HOSPADM

## 2023-09-06 RX ORDER — SODIUM CHLORIDE 0.9 % (FLUSH) 0.9 %
10 SYRINGE (ML) INJECTION EVERY 12 HOURS SCHEDULED
Status: DISCONTINUED | OUTPATIENT
Start: 2023-09-06 | End: 2023-09-18 | Stop reason: HOSPADM

## 2023-09-06 RX ORDER — NICOTINE POLACRILEX 4 MG
15 LOZENGE BUCCAL
Status: DISCONTINUED | OUTPATIENT
Start: 2023-09-06 | End: 2023-09-18 | Stop reason: HOSPADM

## 2023-09-06 RX ORDER — SODIUM CHLORIDE 9 MG/ML
40 INJECTION, SOLUTION INTRAVENOUS AS NEEDED
Status: DISCONTINUED | OUTPATIENT
Start: 2023-09-06 | End: 2023-09-18 | Stop reason: HOSPADM

## 2023-09-06 RX ORDER — DIPHENOXYLATE HYDROCHLORIDE AND ATROPINE SULFATE 2.5; .025 MG/1; MG/1
1 TABLET ORAL DAILY
Status: DISCONTINUED | OUTPATIENT
Start: 2023-09-06 | End: 2023-09-18 | Stop reason: HOSPADM

## 2023-09-06 RX ORDER — ONDANSETRON 4 MG/1
4 TABLET, FILM COATED ORAL EVERY 6 HOURS PRN
Status: DISCONTINUED | OUTPATIENT
Start: 2023-09-06 | End: 2023-09-18 | Stop reason: HOSPADM

## 2023-09-06 RX ORDER — FERROUS SULFATE 325(65) MG
325 TABLET ORAL
Status: DISCONTINUED | OUTPATIENT
Start: 2023-09-06 | End: 2023-09-18 | Stop reason: HOSPADM

## 2023-09-06 RX ORDER — AMLODIPINE BESYLATE 10 MG/1
10 TABLET ORAL
Status: DISCONTINUED | OUTPATIENT
Start: 2023-09-06 | End: 2023-09-18 | Stop reason: HOSPADM

## 2023-09-06 RX ORDER — SODIUM CHLORIDE 9 MG/ML
75 INJECTION, SOLUTION INTRAVENOUS CONTINUOUS
Status: DISCONTINUED | OUTPATIENT
Start: 2023-09-06 | End: 2023-09-06

## 2023-09-06 RX ORDER — ONDANSETRON 2 MG/ML
4 INJECTION INTRAMUSCULAR; INTRAVENOUS EVERY 6 HOURS PRN
Status: DISCONTINUED | OUTPATIENT
Start: 2023-09-06 | End: 2023-09-18 | Stop reason: HOSPADM

## 2023-09-06 RX ORDER — PANTOPRAZOLE SODIUM 40 MG/1
40 TABLET, DELAYED RELEASE ORAL
Status: DISCONTINUED | OUTPATIENT
Start: 2023-09-06 | End: 2023-09-06

## 2023-09-06 RX ORDER — POTASSIUM CHLORIDE 7.45 MG/ML
10 INJECTION INTRAVENOUS
Status: COMPLETED | OUTPATIENT
Start: 2023-09-06 | End: 2023-09-06

## 2023-09-06 RX ORDER — IBUPROFEN 600 MG/1
1 TABLET ORAL
Status: DISCONTINUED | OUTPATIENT
Start: 2023-09-06 | End: 2023-09-18 | Stop reason: HOSPADM

## 2023-09-06 RX ORDER — HYDRALAZINE HYDROCHLORIDE 20 MG/ML
20 INJECTION INTRAMUSCULAR; INTRAVENOUS ONCE
Status: COMPLETED | OUTPATIENT
Start: 2023-09-06 | End: 2023-09-06

## 2023-09-06 RX ORDER — SODIUM CHLORIDE, SODIUM LACTATE, POTASSIUM CHLORIDE, CALCIUM CHLORIDE 600; 310; 30; 20 MG/100ML; MG/100ML; MG/100ML; MG/100ML
75 INJECTION, SOLUTION INTRAVENOUS CONTINUOUS
Status: ACTIVE | OUTPATIENT
Start: 2023-09-06 | End: 2023-09-07

## 2023-09-06 RX ORDER — L.ACID,PARA/B.BIFIDUM/S.THERM 8B CELL
1 CAPSULE ORAL DAILY
Status: DISCONTINUED | OUTPATIENT
Start: 2023-09-06 | End: 2023-09-18 | Stop reason: HOSPADM

## 2023-09-06 RX ORDER — FAMOTIDINE 10 MG/ML
20 INJECTION, SOLUTION INTRAVENOUS DAILY
Status: DISCONTINUED | OUTPATIENT
Start: 2023-09-06 | End: 2023-09-11 | Stop reason: ALTCHOICE

## 2023-09-06 RX ORDER — VANCOMYCIN HYDROCHLORIDE 125 MG/1
125 CAPSULE ORAL 2 TIMES DAILY
Status: DISCONTINUED | OUTPATIENT
Start: 2023-09-06 | End: 2023-09-07

## 2023-09-06 RX ORDER — LABETALOL HYDROCHLORIDE 5 MG/ML
20 INJECTION, SOLUTION INTRAVENOUS ONCE
Status: COMPLETED | OUTPATIENT
Start: 2023-09-06 | End: 2023-09-06

## 2023-09-06 RX ORDER — INSULIN LISPRO 100 [IU]/ML
2-7 INJECTION, SOLUTION INTRAVENOUS; SUBCUTANEOUS
Status: DISCONTINUED | OUTPATIENT
Start: 2023-09-06 | End: 2023-09-18 | Stop reason: HOSPADM

## 2023-09-06 RX ORDER — SODIUM CHLORIDE 0.9 % (FLUSH) 0.9 %
10 SYRINGE (ML) INJECTION AS NEEDED
Status: DISCONTINUED | OUTPATIENT
Start: 2023-09-06 | End: 2023-09-18 | Stop reason: HOSPADM

## 2023-09-06 RX ORDER — GRANULES FOR ORAL 3 G/1
3 POWDER ORAL ONCE
Status: COMPLETED | OUTPATIENT
Start: 2023-09-06 | End: 2023-09-06

## 2023-09-06 RX ORDER — OXYCODONE HYDROCHLORIDE AND ACETAMINOPHEN 5; 325 MG/1; MG/1
1 TABLET ORAL EVERY 6 HOURS PRN
Status: DISCONTINUED | OUTPATIENT
Start: 2023-09-06 | End: 2023-09-18 | Stop reason: HOSPADM

## 2023-09-06 RX ORDER — DEXTROSE MONOHYDRATE 25 G/50ML
25 INJECTION, SOLUTION INTRAVENOUS
Status: DISCONTINUED | OUTPATIENT
Start: 2023-09-06 | End: 2023-09-18 | Stop reason: HOSPADM

## 2023-09-06 RX ORDER — CLONIDINE HYDROCHLORIDE 0.1 MG/1
0.2 TABLET ORAL ONCE
Status: COMPLETED | OUTPATIENT
Start: 2023-09-06 | End: 2023-09-06

## 2023-09-06 RX ORDER — LIDOCAINE 4 G/G
1 PATCH TOPICAL
Status: DISCONTINUED | OUTPATIENT
Start: 2023-09-06 | End: 2023-09-18 | Stop reason: HOSPADM

## 2023-09-06 RX ORDER — MIRTAZAPINE 15 MG/1
7.5 TABLET, FILM COATED ORAL NIGHTLY
Status: DISCONTINUED | OUTPATIENT
Start: 2023-09-06 | End: 2023-09-18 | Stop reason: HOSPADM

## 2023-09-06 RX ORDER — POTASSIUM CHLORIDE 1.5 G/1.58G
40 POWDER, FOR SOLUTION ORAL EVERY 4 HOURS
Status: DISCONTINUED | OUTPATIENT
Start: 2023-09-06 | End: 2023-09-06

## 2023-09-06 RX ORDER — CARVEDILOL 6.25 MG/1
6.25 TABLET ORAL 2 TIMES DAILY WITH MEALS
Status: DISCONTINUED | OUTPATIENT
Start: 2023-09-06 | End: 2023-09-08

## 2023-09-06 RX ADMIN — FAMOTIDINE 20 MG: 10 INJECTION INTRAVENOUS at 12:43

## 2023-09-06 RX ADMIN — CLONIDINE HYDROCHLORIDE 0.2 MG: 0.1 TABLET ORAL at 02:47

## 2023-09-06 RX ADMIN — HYDRALAZINE HYDROCHLORIDE 20 MG: 20 INJECTION INTRAMUSCULAR; INTRAVENOUS at 02:47

## 2023-09-06 RX ADMIN — INSULIN LISPRO 2 UNITS: 100 INJECTION, SOLUTION INTRAVENOUS; SUBCUTANEOUS at 09:30

## 2023-09-06 RX ADMIN — INSULIN DETEMIR 5 UNITS: 100 INJECTION, SOLUTION SUBCUTANEOUS at 09:29

## 2023-09-06 RX ADMIN — SODIUM CHLORIDE, POTASSIUM CHLORIDE, SODIUM LACTATE AND CALCIUM CHLORIDE 75 ML/HR: 600; 310; 30; 20 INJECTION, SOLUTION INTRAVENOUS at 17:42

## 2023-09-06 RX ADMIN — INSULIN HUMAN 10 UNITS: 100 INJECTION, SOLUTION PARENTERAL at 06:08

## 2023-09-06 RX ADMIN — MIRTAZAPINE 7.5 MG: 15 TABLET, FILM COATED ORAL at 21:04

## 2023-09-06 RX ADMIN — Medication 10 ML: at 21:05

## 2023-09-06 RX ADMIN — ONDANSETRON HYDROCHLORIDE 4 MG: 4 TABLET, FILM COATED ORAL at 21:05

## 2023-09-06 RX ADMIN — Medication 1 CAPSULE: at 09:33

## 2023-09-06 RX ADMIN — SODIUM CHLORIDE 1000 MG: 900 INJECTION INTRAVENOUS at 06:10

## 2023-09-06 RX ADMIN — POTASSIUM CHLORIDE 40 MEQ: 1.5 POWDER, FOR SOLUTION ORAL at 12:42

## 2023-09-06 RX ADMIN — OXYCODONE HYDROCHLORIDE AND ACETAMINOPHEN 1 TABLET: 5; 325 TABLET ORAL at 18:55

## 2023-09-06 RX ADMIN — OXYCODONE HYDROCHLORIDE AND ACETAMINOPHEN 1 TABLET: 5; 325 TABLET ORAL at 12:42

## 2023-09-06 RX ADMIN — LIDOCAINE 1 PATCH: 560 PATCH PERCUTANEOUS; TOPICAL; TRANSDERMAL at 12:41

## 2023-09-06 RX ADMIN — Medication 20 MG: at 01:47

## 2023-09-06 RX ADMIN — CARVEDILOL 6.25 MG: 6.25 TABLET, FILM COATED ORAL at 17:44

## 2023-09-06 RX ADMIN — FERROUS SULFATE TAB 325 MG (65 MG ELEMENTAL FE) 325 MG: 325 (65 FE) TAB at 09:33

## 2023-09-06 RX ADMIN — RIVAROXABAN 15 MG: 15 TABLET, FILM COATED ORAL at 17:44

## 2023-09-06 RX ADMIN — AMLODIPINE BESYLATE 10 MG: 10 TABLET ORAL at 09:31

## 2023-09-06 RX ADMIN — POTASSIUM CHLORIDE 10 MEQ: 7.46 INJECTION, SOLUTION INTRAVENOUS at 13:34

## 2023-09-06 RX ADMIN — GRANULES FOR ORAL SOLUTION 3 G: 3 POWDER ORAL at 13:32

## 2023-09-06 RX ADMIN — VANCOMYCIN HYDROCHLORIDE 125 MG: 125 CAPSULE ORAL at 12:42

## 2023-09-06 RX ADMIN — CARVEDILOL 6.25 MG: 6.25 TABLET, FILM COATED ORAL at 09:32

## 2023-09-06 RX ADMIN — VANCOMYCIN HYDROCHLORIDE 125 MG: 125 CAPSULE ORAL at 21:04

## 2023-09-06 RX ADMIN — Medication 5 MG: at 21:04

## 2023-09-06 RX ADMIN — PANTOPRAZOLE SODIUM 40 MG: 40 TABLET, DELAYED RELEASE ORAL at 09:32

## 2023-09-06 RX ADMIN — INSULIN LISPRO 2 UNITS: 100 INJECTION, SOLUTION INTRAVENOUS; SUBCUTANEOUS at 12:41

## 2023-09-06 RX ADMIN — POTASSIUM CHLORIDE 10 MEQ: 7.46 INJECTION, SOLUTION INTRAVENOUS at 15:45

## 2023-09-06 RX ADMIN — SODIUM CHLORIDE 75 ML/HR: 9 INJECTION, SOLUTION INTRAVENOUS at 07:02

## 2023-09-06 RX ADMIN — MULTIVITAMIN TABLET 1 TABLET: TABLET at 09:32

## 2023-09-06 RX ADMIN — INSULIN LISPRO 2 UNITS: 100 INJECTION, SOLUTION INTRAVENOUS; SUBCUTANEOUS at 17:45

## 2023-09-06 RX ADMIN — TERAZOSIN HYDROCHLORIDE 1 MG: 1 CAPSULE ORAL at 21:04

## 2023-09-06 NOTE — PROGRESS NOTES
[facility] Medicine Services  ADMISSION FOLLOW-UP NOTE          Patient admitted after midnight, H&P by my partner performed earlier on today's date reviewed.  Interim findings, labs, and charting also reviewed.        The [facility] Hospital Problem List has been managed and updated to include any new diagnoses:  Active Hospital Problems    Diagnosis  POA    **Hypertensive urgency [I16.0]  Yes    Acute UTI (urinary tract infection) [N39.0]  Yes    Nausea vomiting and diarrhea [R11.2, R19.7]  Yes    PAF (paroxysmal atrial fibrillation) [I48.0]  Yes    Anxiety associated with depression [F41.8]  Yes    Clavicle fracture [S42.009A]  Yes    CKD (chronic kidney disease) stage 3, GFR 30-59 ml/min [N18.30]  Yes    Hypokalemia [E87.6]  Yes    Gastroparesis [K31.84]  Yes    Uncontrolled type 1 diabetes mellitus with hyperglycemia [E10.65]  Yes    Hyperlipidemia [E78.5]  Yes    Hypertension [I10]  Yes      Resolved Hospital Problems   No resolved problems to display.         ADDITIONAL PLAN:  - detailed assessment and plan from admission reviewed  - patient seen and examined    DM type I with hyperglycemia  -Continue SSI, added Levemir 5 units daily    RAFAT on CKD  Hypernatremia  Hypokalemia  -Transitioned from NS to LR IVF  -A.m. labs; replace electrolytes as needed    Suspected UTI  -Prior Enterococcus UTI; also with history of prior C. difficile colonization  -Discussed with pharmacy, will give fosfomycin x1  -Lactobacillus, Ppx PO vanc for now (stopped PPI, transitioned to pepcid given risk)  -Follow-up urine culture    Hypertensive urgency  -Blood pressure is now more controlled, wean Cardene drip and continue home anti-hypertensives    Prolonged QT  -Potentially related to electrolyte derangements  -AM EKG    Rest per admission H&P    Expected Discharge   Expected Discharge Date: 9/7/2023; Expected Discharge Time:      Faith Nelson MD  09/06/23

## 2023-09-06 NOTE — PLAN OF CARE
"Goal Outcome Evaluation:  Plan of Care Reviewed With: patient        Progress: no change  Outcome Evaluation: VSS, SR, RA sats ok, denies nausea, no stool since ER, poor appetite, bladder scan 209 earlier but later incon urine, bp not remaining high enough to start cardene gtt, K 3.2 and replaced, k level to be drawn later, states \"l clavicle hurt before admission and to see ortho MD next week, LR at 75         "

## 2023-09-06 NOTE — CASE MANAGEMENT/SOCIAL WORK
Continued Stay Note  [facility]     Patient Name: Thelma Michael  MRN: [id number]  Today's Date: 9/6/2023    Admit Date: 9/5/2023    Plan: discharge plan   Discharge Plan       Row Name 09/06/23 5560       Plan    Plan discharge plan    Plan Comments Pt is here with hypertensive urgency. Pt denies being current with any HH. Plan home with son and does not anticipate any discharge needs at this time. CM will cont to follow    Final Discharge Disposition Code 01 - home or self-care                   Discharge Codes    No documentation.                 Expected Discharge Date and Time       Expected Discharge Date Expected Discharge Time    Sep 8, 2023               Nicky Baltazar RN

## 2023-09-06 NOTE — CONSULTS
Reviewed chart for diabetes education consult.  Noted history of Type1 diabetes. Noted A1c of 13.1 in June of 2023.  Noted on insulin at home to treat diabetes.  Spoke to RN, she stated that she is sleeping right now.  She stated that she thinks that she may be on an insulin pump at home.  Explained that we are following and will continue to attempt to see her for diabetes education.  Thank you for this referral.

## 2023-09-06 NOTE — CASE MANAGEMENT/SOCIAL WORK
Discharge Planning Assessment  [facility]     Patient Name: Thelma Michael  MRN: [id number]  Today's Date: 9/6/2023    Admit Date: 9/5/2023    Plan: discharge plan   Discharge Needs Assessment       Row Name 09/06/23 1704       Living Environment    People in Home child(bronson), adult    Name(s) of People in Home Bell Purcell(son)    Primary Care Provided by self    Provides Primary Care For no one, unable/limited ability to care for self    Family Caregiver if Needed child(bronson), adult    Family Caregiver Names Keshav Soto(son)    Quality of Family Relationships supportive;involved    Able to Return to Prior Arrangements yes    Living Arrangement Comments I spoke with pt with permission regarding discharge plan. Pt resides in [place] [facility] in an apartment with her son, Keshav.       Resource/Environmental Concerns    Resource/Environmental Concerns other (see comments)  Denies       Transition Planning    Patient/Family Anticipates Transition to home with family    Patient/Family Anticipated Services at Transition     Transportation Anticipated family or friend will provide       Discharge Needs Assessment    Readmission Within the Last 30 Days no previous admission in last 30 days    Equipment Currently Used at Home walker, rolling;cane, straight;bp cuff;glucometer    Concerns to be Addressed discharge planning    Equipment Needed After Discharge bp cuff;cane, straight;walker, rolling;glucometer    Discharge Coordination/Progress Pt confirms that she has [facility] with prescription coverage. Pt uses [facility] on [address] [facility] in [place]. Pt reports she is independent with ADLs and uses straight cane and RW  Pt has history of gatroparessis and has gastric stimulator. Pt is here with hypertensive urgency. Pt denies being current with any HH. Plan home with son and does not anticipate any discharge needs at this time. CM will cont to follow                   Discharge Plan        Row Name 09/06/23 1710       Plan    Plan discharge plan    Plan Comments Pt is here with hypertensive urgency. Pt denies being current with any HH. Plan home with son and does not anticipate any discharge needs at this time. CM will cont to follow    Final Discharge Disposition Code 01 - home or self-care                  Continued Care and Services - Admitted Since 9/5/2023    Coordination has not been started for this encounter.       Expected Discharge Date and Time       Expected Discharge Date Expected Discharge Time    Sep 8, 2023            Demographic Summary       Row Name 09/06/23 1700       General Information    General Information Comments PCP is MARKY JUAREZ       Contact Information    Permission Granted to Share Info With     Contact Information Obtained for     Contact Information Comments Jackelyn Soto(son) [phone]                   Functional Status    No documentation.                  Psychosocial    No documentation.                  Abuse/Neglect    No documentation.                  Legal    No documentation.                  Substance Abuse    No documentation.                  Patient Forms    No documentation.                     Nicky Baltazar RN

## 2023-09-06 NOTE — H&P
[facility] Medicine Services  HISTORY AND PHYSICAL    Patient Name: Thelma Michael  : 1960  MRN: [id number]  Primary Care Physician: Monet Howe APRN  Date of admission: 2023    Subjective   Subjective     Chief Complaint:  Nausea, vomiting, diarrhea     HPI:  Thelma Michael is a 62 y.o. female w/ a hx of poorly controlled type 1 diabetes, Afib on Xarelto, iron deficiency anemia, gastroparesis s/p gastric stimulator, HTN, HLD, migraines, chronic urinary retention (previously performed self-catheterization), strokes (chronic right lentiform nucleus and left cerebellar lacunar infarcts found on MRI in 2023), tobacco abuse and GERD who presented to the emergency room with complaints of nausea, vomiting and diarrhea.  Patient evaluated in the emergency room on 2023.  Patient presented with complaints of a left shoulder injury.  X-rays consistent with a distal left clavicle fracture.  Patient was prescribed a short course of Norco for pain control.  Patient was also referred to Dr. Malagon with orthopedics for follow-up next week.  Patient was ultimately discharged home.  [facility] of [facility].  Patient reports that she developed nausea and vomiting 2 days ago and developed diarrhea on Tuesday morning.  Since being in the [facility] she has had approximately 3 episodes of diarrhea.  Patient has been unable to tolerate any oral intake for the past 2 days including her routine oral medications.  Patient denies fever, chills, chest pain, dyspnea, cough, melena, hematochezia, abdominal pain, dysuria, edema, syncope.  Patient evaluated in the emergency room.  Urinalysis concerning for UTI. Initial glucose 316, anion gap 22, CO2 19. Pt admitted to the hospital medicine service for further evaluation.     Review of Systems   Constitutional: Negative.  Negative for chills,  diaphoresis, fatigue and fever.   HENT: Negative.  Negative for congestion, postnasal drip, rhinorrhea, sinus pressure, sinus pain, sneezing, sore throat and trouble swallowing.    Eyes: Negative.  Negative for visual disturbance.   Respiratory: Negative.  Negative for cough, chest tightness, shortness of breath and wheezing.    Cardiovascular: Negative.  Negative for chest pain, palpitations and leg swelling.   Gastrointestinal:  Positive for diarrhea, nausea and vomiting. Negative for abdominal distention, blood in stool and constipation.        Abdominal cramping.    Endocrine: Negative.    Genitourinary:  Positive for decreased urine volume. Negative for dysuria.   Musculoskeletal: Negative.  Negative for arthralgias, back pain, myalgias, neck pain and neck stiffness.   Skin: Negative.  Negative for wound.   Allergic/Immunologic: Negative.  Negative for immunocompromised state.   Neurological: Negative.  Negative for dizziness, tremors, seizures, syncope, facial asymmetry, speech difficulty, weakness, light-headedness, numbness and headaches.   Hematological: Negative.  Does not bruise/bleed easily.   Psychiatric/Behavioral: Negative.  Negative for confusion.    All other systems reviewed and are negative.     Personal History     Past Medical History:   Diagnosis Date    Acid reflux     Acute bronchitis     Cardiac murmur     Depression with anxiety 10/5/2020    Diabetes mellitus     Gastroesophageal reflux disease 5/13/2016    H/O echocardiogram 08/07/2012    i. LVEF 65%.ii. Mild LVH.iii. Borderline evidence of atrial septal aneurysm.  No PFO.     History of nuclear stress test 08/22/2014    Negative for ischemia and scars; LVEF 77%.      History of TIAs 7/15/2021    Hyperlipidemia     Hypertension     Impacted cerumen of both ears     Migraine     Migraines 10/31/2019    Self-catheterizes urinary bladder     Sinusitis     Stroke     Tobacco abuse     quit 4 days ago.      Urticaria     Vitamin D deficiency  5/13/2016     Past Surgical History:   Procedure Laterality Date    CAPSULE ENDOSCOPY  07/27/2021    Procedure: PILLCAM DEPLOYMENT;  Surgeon: Mikael Worthy MD;  Location:  [facility];  Service: Gastroenterology;;    COLONOSCOPY      COLONOSCOPY N/A 07/27/2021    Procedure: COLONOSCOPY;  Surgeon: Mikael Worthy MD;  Location:  [facility];  Service: Gastroenterology;  Laterality: N/A;    DENTAL PROCEDURE      ENDOSCOPY N/A 06/30/2021    Procedure: ESOPHAGOGASTRODUODENOSCOPY;  Surgeon: Brunner, Mark I, MD;  Location:  [facility];  Service: Gastroenterology;  Laterality: N/A;    ENDOSCOPY N/A 07/27/2021    Procedure: ESOPHAGOGASTRODUODENOSCOPY;  Surgeon: Mikael Worthy MD;  Location:  [facility];  Service: Gastroenterology;  Laterality: N/A;    ENDOSCOPY WITH JTUBE N/A 03/16/2022    Procedure: ESOPHAGOGASTRODUODENOSCOPY WITH JEJUNAL TUBE INSERTION;  Surgeon: Thom Glover MD;  Location:  [facility];  Service: Gastroenterology;  Laterality: N/A;    UPPER GASTROINTESTINAL ENDOSCOPY       Family History:  family history includes ADD / ADHD in an other family member; Anxiety disorder in an other family member; Arthritis in an other family member; Depression in an other family member; Diabetes in her brother and another family member; Heart disease in an other family member; Hyperlipidemia in her mother and another family member; Hypertension in her brother, mother, and another family member; Lung cancer in an other family member; Osteoporosis in an other family member.     Social History:  reports that she quit smoking about 4 years ago. Her smoking use included cigarettes. She has a 7.50 pack-year smoking history. She has never used smokeless tobacco. She reports current alcohol use of about 1.0 standard drink per week. She reports current drug use. Frequency: 2.00 times per week. Drug: Marijuana.  Social History     Social History Narrative    Not on file      Medications:  BASAGLAR KWIKPEN, FreeStyle Lite, HYDROcodone-acetaminophen, Insulin Lispro, acetaminophen, albuterol sulfate HFA, amLODIPine, calcium carbonate, carvedilol, ferrous sulfate, gabapentin, glucose blood, insulin detemir, lactobacillus acidophilus, loperamide, losartan, melatonin, mirtazapine, multivitamin, pantoprazole, rivaroxaban, sennosides-docusate, terazosin, traMADol, and vitamin D    No Known Allergies    Objective   Objective     Vital Signs:   Temp:  [98.6 °F (37 °C)] 98.6 °F (37 °C)  Heart Rate:  [103-114] 114  Resp:  [18] 18  BP: (130-236)/() 158/74    Physical Exam     Constitutional: Awake, alert; ill appearing   Eyes: PERRLA, sclerae anicteric, no conjunctival injection  HENT: NCAT, mucous membranes dry   Neck: Supple, no thyromegaly, no lymphadenopathy, trachea midline  Respiratory: Clear to auscultation bilaterally, nonlabored respirations   Cardiovascular: RRR, no murmurs, rubs, or gallops, no peripheral edema   Gastrointestinal: Positive bowel sounds, soft, nontender, nondistended  Musculoskeletal: Normal ROM BLE and RUE; LUE w/ sling in place   Psychiatric: Appropriate affect, cooperative  Neurologic: Oriented x 3, strength symmetric in all extremities, Cranial Nerves grossly intact to confrontation, speech clear  Skin: No rashes, lesions or wounds     Result Review:  I have personally reviewed the results from the time of this admission to 9/6/2023 05:55 EDT and agree with these findings:  [x]  Laboratory list / accordion  []  Microbiology  [x]  Radiology  []  EKG/Telemetry   []  Cardiology/Vascular   []  Pathology  [x]  Old records    LAB RESULTS:      Lab 09/05/23  2320   WBC 7.29   HEMOGLOBIN 13.0   HEMATOCRIT 39.1   PLATELETS 404   NEUTROS ABS 6.14   IMMATURE GRANS (ABS) 0.02   LYMPHS ABS 0.83   MONOS ABS 0.29   EOS ABS 0.00   MCV 86.9         Lab 09/05/23  2320   SODIUM 144   POTASSIUM 3.2*   CHLORIDE 103   CO2 19.0*   ANION GAP 22.0*   BUN 23   CREATININE 1.22*   EGFR  49.7*   GLUCOSE 316*   CALCIUM 9.1         Lab 09/05/23  2320   TOTAL PROTEIN 8.2   ALBUMIN 4.0   GLOBULIN 4.2   ALT (SGPT) 13   AST (SGOT) 36*   BILIRUBIN 0.5   ALK PHOS 111   LIPASE 26                     Lab 09/06/23  0426   PH, ARTERIAL 7.436   PCO2, ARTERIAL 32.2*   PO2 ART 83.3   FIO2 21   HCO3 ART 21.7   BASE EXCESS ART -1.9*   CARBOXYHEMOGLOBIN 0.9     Brief Urine Lab Results  (Last result in the past 365 days)        Color   Clarity   Blood   Leuk Est   Nitrite   Protein   CREAT   Urine HCG        09/05/23 2309 Yellow   Turbid   Large (3+)   Moderate (2+)   Negative   >=300 mg/dL (3+)                 Microbiology Results (last 10 days)       ** No results found for the last 240 hours. **          CT Abdomen Pelvis Without Contrast    Result Date: 9/6/2023  CT ABDOMEN PELVIS WO CONTRAST Date of Exam: 9/6/2023 12:19 AM EDT Indication: Abdominal pain, acute, nonlocalized. Comparison: 4/30/2023. Technique: Axial CT images were obtained of the abdomen and pelvis without the administration of contrast. Reconstructed coronal and sagittal images were also obtained. Automated exposure control and iterative construction methods were used. Findings: Lung Bases:   The visualized lung bases and lower mediastinal structures are unremarkable. Limited evaluation of the solid organs due to lack of intravenous contrast. Liver: Liver is normal in size and CT density. No focal lesions. Biliary/Gallbladder:  The gallbladder is normal without evidence of radiopaque stones. The biliary tree is nondilated. Spleen: Spleen is normal in size and CT density. Pancreas:  Pancreas is normal. There is no evidence of pancreatic mass or peripancreatic fluid. Kidneys:  Kidneys are normal in size. There are no stones or hydronephrosis. Adrenals:  There is thickening of the bilateral adrenal glands likely related to hyperplasia, similar as compared to the previous study.. Retroperitoneal/Lymph Nodes/Vasculature:  No retroperitoneal adenopathy  is identified. Gastrointestinal/Mesentery:  The bowel loops are non-dilated without wall thickening or mass. The appendix appears within normal limits. No evidence of obstruction. No free air. No mesenteric fluid collections identified. Gastric stimulator device seen within the anterior soft tissues on the right. No significant stool burden identified. Scattered diverticula are present. No significant inflammatory changes. Bladder:  Circumferential bladder wall thickening is present which appears similar as compared to the previous study. Bladder diverticulum present along the superior margin. Genital:   Unremarkable       Bony Structures:   Visualized bony structures are consistent with the patient's age.     Impression: Impression: 1. No acute intra-abdominal or intrapelvic process. Circumferential bladder wall thickening is present likely related to chronic cystitis or outlet obstruction, similar as compared to previous studies. 2. Ancillary findings as described above.. Electronically Signed: Anita Caraballo MD  9/6/2023 12:38 AM EDT  Workstation ID: [id number]    XR Chest 1 View    Result Date: 9/6/2023  XR CHEST 1 VW Date of Exam: 9/6/2023 5:05 AM EDT Indication: cough Comparison: None available. Findings: There are no airspace consolidations. No pleural fluid. No pneumothorax. The pulmonary vasculature appears within normal limits. The cardiac and mediastinal silhouette appear unremarkable. No acute osseous abnormality identified.     Impression: Impression: No acute cardiopulmonary process. Electronically Signed: Anita Caraballo MD  9/6/2023 5:33 AM EDT  Workstation ID: [id number]     Results for orders placed during the hospital encounter of 12/06/22    Adult Transthoracic Echo Complete W/ Cont if Necessary Per Protocol    Interpretation Summary    Left ventricular ejection fraction appears to be greater than 70%.    Left ventricular wall thickness is consistent with moderate concentric hypertrophy.    Left  ventricular diastolic function is consistent with (grade I) impaired relaxation.    Estimated right ventricular systolic pressure from tricuspid regurgitation is mildly elevated (35-45 mmHg). Calculated right ventricular systolic pressure from tricuspid regurgitation is 39 mmHg.    Assessment & Plan   Assessment & Plan       Hypertensive urgency    Hyperlipidemia    Hypertension    Uncontrolled type 1 diabetes mellitus with hyperglycemia    Gastroparesis    Hypokalemia    CKD (chronic kidney disease) stage 3, GFR 30-59 ml/min    Acute UTI (urinary tract infection)    Nausea vomiting and diarrhea    PAF (paroxysmal atrial fibrillation)    Anxiety associated with depression    Clavicle fracture    Thelma Michael is a 62 y.o. female w/ a hx of poorly controlled type 1 diabetes, Afib on Xarelto, iron deficiency anemia, gastroparesis s/p gastric stimulator, HTN, HLD, migraines, chronic urinary retention (previously performed self-catheterization), strokes (chronic right lentiform nucleus and left cerebellar lacunar infarcts found on MRI in February of 2023), tobacco abuse and GERD who presented to the emergency room with complaints of nausea, vomiting and diarrhea.    **Hypertensive urgency   -initial /122   -s/p Clonidine, hydralazine, labetalol given in ED w/ little improvement  -started on Cardene infusion  -continue routine meds including Coreg, Norvasc  -holding routine Losartan   -consider Cardiology consult   **Hyperglycemia   **T1DM  -ABG w/ CO2 32; anion gap 22.0  -initial glucose 316  -s/p 10 units regular insulin   -holding routine 20 units of levemir- restart when tolerating PO   -FSBG q ac/hs w/ sliding scale   -IVF   -repeat labs pending now   -diabetic educator consult   -hem A1c pending    **Nausea, vomiting, diarrhea   **Hx of gastroparesis   -CT abd/pel without acute findings other than bladder wall thickening c/w chronic cystitis   -UA concerning for UTI   -lactic acid pending  -GI panel  pcr pending   -respiratory panel pcr pending   -s/p 1 liter NS bolus   -continue NS @ 75ml/hour   -clear liquid diet- advance as tolerated     **Acute UTI   **Hx of neurogenic bladder (previously self-cath'd)  -UA w/ 4+ bacteria, 31-50 WBCs   -urine culture pending   -blood cx pending   -lactic and procal pending   -previous urine culture + for enterococcus faecalis in 4/23 (previous results w/ mixed sameera)  -IVF  -IV Rocephin started pending culture results   -bladder scan q 4     **Hypokalemia   -K 3.2  -replace per protocol     **CKD Stage III  -baseline CR 1.2-1.3  -current 1.22 w/ eGFR 49.7  -IVF  -monitor     **PAF   -continue coreg, Xarelto   -EKG pending     DVT prophylaxis:  Xarelto     CODE STATUS:    Code Status (Patient has no pulse and is not breathing): CPR (Attempt to Resuscitate)  Medical Interventions (Patient has pulse or is breathing): Full Support    Expected Discharge    This note has been completed as part of a split-shared workflow.     Signature: Electronically signed by HOWIE Moreno, 09/06/23, 5:55 AM EDT.    Time spent: 55 minutes         Attending   Admission Attestation       I have performed an independent face-to-face diagnostic evaluation including performing an independent physical examination.  The documented plan of care above was reviewed and developed with the advanced practice clinician (APC).  I have updated the HPI as appropriate.    Brief HPI    62 F who states that she has had diarrhea as well as nausea with emesis over the last 2 days.  She notes that the diarrhea got worse this morning (Tuesday).  He states that because of the symptoms she has had almost no oral intake of food or fluids over the last 48 hours.  She has also not been able to take her home medications, including her diabetes medications or her antihypertensives.  She also notes that she has had 3 episodes of diarrhea in the ED; no kalie blood in any of the stools or emesis.    Attending Physical  Exam:  Temp:  [98.6 °F (37 °C)] 98.6 °F (37 °C)  Heart Rate:  [103-114] 114  Resp:  [18] 18  BP: (130-236)/() 158/74    Constitutional: Awake, alert, appears chronically ill.  Eyes: PERRLA, sclerae anicteric, no conjunctival injection  HENT: NCAT, mucous membranes dry.  Neck: Supple, no thyromegaly, no lymphadenopathy, trachea midline  Respiratory: Clear to auscultation bilaterally, nonlabored respirations   Cardiovascular: RRR, no murmurs, rubs, or gallops, palpable pedal pulses bilaterally  Gastrointestinal: Positive but moderately hypoactive bowel sounds, soft, nontender, nondistended  Musculoskeletal: No bilateral ankle edema, no clubbing or cyanosis to extremities  Psychiatric: Appropriate affect, cooperative  Neurologic: Oriented x 3, strength symmetric in all extremities but LUE is in a sling so it is not tested; cranial Nerves grossly intact to confrontation, speech quiet but clear  Skin: No rashes, poor turgor.    Assessment and Plan:      Total time spent: 24 minutes  Time spent includes time reviewing chart, face-to-face time, counseling patient/family/caregiver, ordering medications/tests/procedures, communicating with other health care professionals, documenting clinical information in the electronic health record, and coordination of care.       See assessment and plan documented by APC above and updated/edited by me as appropriate.    Martin Martinez III, DO  09/06/23

## 2023-09-06 NOTE — CONSULTS
Followed up with Ms. Michael by phone this afternoon.  She answered her cell phone.  She stated that she has had Type 1 diabetes since she was 38 years old.  She states that she sees Dr. Gerson Ochoa for diabetes care.  She states she was on DexCom G6 insulin pump, but lost her charging cord, and she states that she has not been using it lately.  She states she called endocrinology office, and they told her what medication to take in the mean time while she waits to replace her cord.  She states she was not sure how to get another one.  Suggested she call the customer service number for DexCom and ask them how to obtain a new cord.  She states that her next follow up with endocrinology is October 27, 2023.  Encouraged her that the office would be able to help her figure out a way to get her cord replaced as well  by giving her contact information.  Encouraged her that there is a Diabetes Educator on staff there that can also help her. She states that for now she is taking Levemir 20 units q am, and a sliding scale humalog three times a day with meals depending on what her blood sugar is.  She states she has a FSBS monitor and is using that to check blood sugar 3-4 times daily.  I asked her if she wanted me to mail her the customer service number or web address for DexCom, and she stated that was not necessary, she would be able to locate the phone number.  Asked if she had any further questions or needs related to diabetes care.  She stated she did not .  I asked if she would like to see a dietician for diabetes food related questions while she is here, and she stated she did not.  Encouraged her to let her nurse know if she had any questions or needs related to diabetes and her nurse would be able to contact us. Thank you for this referral.

## 2023-09-07 LAB
ADV 40+41 DNA STL QL NAA+NON-PROBE: NOT DETECTED
ANION GAP SERPL CALCULATED.3IONS-SCNC: 11 MMOL/L (ref 5–15)
ASTRO TYP 1-8 RNA STL QL NAA+NON-PROBE: NOT DETECTED
BACTERIA BLD CULT: ABNORMAL
BACTERIA BLD CULT: ABNORMAL
BOTTLE TYPE: ABNORMAL
BOTTLE TYPE: ABNORMAL
BUN SERPL-MCNC: 20 MG/DL (ref 8–23)
BUN/CREAT SERPL: 15.9 (ref 7–25)
C CAYETANENSIS DNA STL QL NAA+NON-PROBE: NOT DETECTED
C COLI+JEJ+UPSA DNA STL QL NAA+NON-PROBE: NOT DETECTED
CALCIUM SPEC-SCNC: 8.3 MG/DL (ref 8.6–10.5)
CHLORIDE SERPL-SCNC: 111 MMOL/L (ref 98–107)
CO2 SERPL-SCNC: 21 MMOL/L (ref 22–29)
CREAT SERPL-MCNC: 1.26 MG/DL (ref 0.57–1)
CRYPTOSP DNA STL QL NAA+NON-PROBE: NOT DETECTED
E HISTOLYT DNA STL QL NAA+NON-PROBE: NOT DETECTED
EAEC PAA PLAS AGGR+AATA ST NAA+NON-PRB: DETECTED
EC STX1+STX2 GENES STL QL NAA+NON-PROBE: NOT DETECTED
EGFRCR SERPLBLD CKD-EPI 2021: 47.8 ML/MIN/1.73
EPEC EAE GENE STL QL NAA+NON-PROBE: NOT DETECTED
ETEC LTA+ST1A+ST1B TOX ST NAA+NON-PROBE: NOT DETECTED
G LAMBLIA DNA STL QL NAA+NON-PROBE: NOT DETECTED
GLUCOSE BLDC GLUCOMTR-MCNC: 100 MG/DL (ref 70–130)
GLUCOSE BLDC GLUCOMTR-MCNC: 109 MG/DL (ref 70–130)
GLUCOSE BLDC GLUCOMTR-MCNC: 112 MG/DL (ref 70–130)
GLUCOSE BLDC GLUCOMTR-MCNC: 181 MG/DL (ref 70–130)
GLUCOSE BLDC GLUCOMTR-MCNC: 72 MG/DL (ref 70–130)
GLUCOSE SERPL-MCNC: 58 MG/DL (ref 65–99)
MAGNESIUM SERPL-MCNC: 2 MG/DL (ref 1.6–2.4)
NOROVIRUS GI+II RNA STL QL NAA+NON-PROBE: NOT DETECTED
P SHIGELLOIDES DNA STL QL NAA+NON-PROBE: NOT DETECTED
PHOSPHATE SERPL-MCNC: 2.6 MG/DL (ref 2.5–4.5)
POTASSIUM SERPL-SCNC: 3.4 MMOL/L (ref 3.5–5.2)
POTASSIUM SERPL-SCNC: 3.6 MMOL/L (ref 3.5–5.2)
QT INTERVAL: 436 MS
QTC INTERVAL: 470 MS
RVA RNA STL QL NAA+NON-PROBE: NOT DETECTED
S ENT+BONG DNA STL QL NAA+NON-PROBE: NOT DETECTED
SAPO I+II+IV+V RNA STL QL NAA+NON-PROBE: NOT DETECTED
SHIGELLA SP+EIEC IPAH ST NAA+NON-PROBE: NOT DETECTED
SODIUM SERPL-SCNC: 143 MMOL/L (ref 136–145)
V CHOL+PARA+VUL DNA STL QL NAA+NON-PROBE: NOT DETECTED
V CHOLERAE DNA STL QL NAA+NON-PROBE: NOT DETECTED
Y ENTEROCOL DNA STL QL NAA+NON-PROBE: NOT DETECTED

## 2023-09-07 PROCEDURE — 63710000001 PROCHLORPERAZINE MALEATE PER 5 MG: Performed by: FAMILY MEDICINE

## 2023-09-07 PROCEDURE — 87507 IADNA-DNA/RNA PROBE TQ 12-25: CPT | Performed by: NURSE PRACTITIONER

## 2023-09-07 PROCEDURE — 0 POTASSIUM CHLORIDE 10 MEQ/100ML SOLUTION

## 2023-09-07 PROCEDURE — 84100 ASSAY OF PHOSPHORUS: CPT | Performed by: NURSE PRACTITIONER

## 2023-09-07 PROCEDURE — 63710000001 INSULIN DETEMIR PER 5 UNITS: Performed by: STUDENT IN AN ORGANIZED HEALTH CARE EDUCATION/TRAINING PROGRAM

## 2023-09-07 PROCEDURE — 83735 ASSAY OF MAGNESIUM: CPT | Performed by: NURSE PRACTITIONER

## 2023-09-07 PROCEDURE — 63710000001 INSULIN LISPRO (HUMAN) PER 5 UNITS: Performed by: NURSE PRACTITIONER

## 2023-09-07 PROCEDURE — 87040 BLOOD CULTURE FOR BACTERIA: CPT | Performed by: FAMILY MEDICINE

## 2023-09-07 PROCEDURE — 25010000002 VANCOMYCIN 10 G RECONSTITUTED SOLUTION

## 2023-09-07 PROCEDURE — 80048 BASIC METABOLIC PNL TOTAL CA: CPT | Performed by: STUDENT IN AN ORGANIZED HEALTH CARE EDUCATION/TRAINING PROGRAM

## 2023-09-07 PROCEDURE — 0 POTASSIUM CHLORIDE 10 MEQ/100ML SOLUTION: Performed by: NURSE PRACTITIONER

## 2023-09-07 PROCEDURE — G0378 HOSPITAL OBSERVATION PER HR: HCPCS

## 2023-09-07 PROCEDURE — 25010000002 PROCHLORPERAZINE 10 MG/2ML SOLUTION: Performed by: FAMILY MEDICINE

## 2023-09-07 PROCEDURE — 93005 ELECTROCARDIOGRAM TRACING: CPT | Performed by: STUDENT IN AN ORGANIZED HEALTH CARE EDUCATION/TRAINING PROGRAM

## 2023-09-07 PROCEDURE — 25010000002 HYDRALAZINE PER 20 MG: Performed by: FAMILY MEDICINE

## 2023-09-07 PROCEDURE — 25010000002 ONDANSETRON PER 1 MG: Performed by: NURSE PRACTITIONER

## 2023-09-07 PROCEDURE — 99233 SBSQ HOSP IP/OBS HIGH 50: CPT | Performed by: NURSE PRACTITIONER

## 2023-09-07 PROCEDURE — 82948 REAGENT STRIP/BLOOD GLUCOSE: CPT

## 2023-09-07 PROCEDURE — 84132 ASSAY OF SERUM POTASSIUM: CPT | Performed by: FAMILY MEDICINE

## 2023-09-07 PROCEDURE — 93010 ELECTROCARDIOGRAM REPORT: CPT | Performed by: STUDENT IN AN ORGANIZED HEALTH CARE EDUCATION/TRAINING PROGRAM

## 2023-09-07 RX ORDER — POTASSIUM CHLORIDE 7.45 MG/ML
10 INJECTION INTRAVENOUS
Status: DISPENSED | OUTPATIENT
Start: 2023-09-07 | End: 2023-09-07

## 2023-09-07 RX ORDER — PROCHLORPERAZINE 25 MG
25 SUPPOSITORY, RECTAL RECTAL EVERY 12 HOURS PRN
Status: DISCONTINUED | OUTPATIENT
Start: 2023-09-07 | End: 2023-09-18 | Stop reason: HOSPADM

## 2023-09-07 RX ORDER — POTASSIUM CHLORIDE 7.45 MG/ML
10 INJECTION INTRAVENOUS
Status: COMPLETED | OUTPATIENT
Start: 2023-09-07 | End: 2023-09-08

## 2023-09-07 RX ORDER — HYDRALAZINE HYDROCHLORIDE 20 MG/ML
10 INJECTION INTRAMUSCULAR; INTRAVENOUS EVERY 6 HOURS PRN
Status: DISCONTINUED | OUTPATIENT
Start: 2023-09-07 | End: 2023-09-18 | Stop reason: HOSPADM

## 2023-09-07 RX ORDER — PROCHLORPERAZINE EDISYLATE 5 MG/ML
5 INJECTION INTRAMUSCULAR; INTRAVENOUS EVERY 6 HOURS PRN
Status: DISCONTINUED | OUTPATIENT
Start: 2023-09-07 | End: 2023-09-18 | Stop reason: HOSPADM

## 2023-09-07 RX ORDER — POTASSIUM CHLORIDE 20 MEQ/1
40 TABLET, EXTENDED RELEASE ORAL EVERY 4 HOURS
Status: DISPENSED | OUTPATIENT
Start: 2023-09-07 | End: 2023-09-07

## 2023-09-07 RX ORDER — PROCHLORPERAZINE MALEATE 5 MG/1
5 TABLET ORAL EVERY 6 HOURS PRN
Status: DISCONTINUED | OUTPATIENT
Start: 2023-09-07 | End: 2023-09-18 | Stop reason: HOSPADM

## 2023-09-07 RX ADMIN — VANCOMYCIN HYDROCHLORIDE 1250 MG: 10 INJECTION, POWDER, LYOPHILIZED, FOR SOLUTION INTRAVENOUS at 05:37

## 2023-09-07 RX ADMIN — Medication 1 CAPSULE: at 08:15

## 2023-09-07 RX ADMIN — VANCOMYCIN HYDROCHLORIDE 125 MG: 125 CAPSULE ORAL at 08:15

## 2023-09-07 RX ADMIN — POTASSIUM CHLORIDE 10 MEQ: 7.46 INJECTION, SOLUTION INTRAVENOUS at 15:50

## 2023-09-07 RX ADMIN — POTASSIUM CHLORIDE 10 MEQ: 7.46 INJECTION, SOLUTION INTRAVENOUS at 22:28

## 2023-09-07 RX ADMIN — LIDOCAINE 1 PATCH: 560 PATCH PERCUTANEOUS; TOPICAL; TRANSDERMAL at 08:14

## 2023-09-07 RX ADMIN — CARVEDILOL 6.25 MG: 6.25 TABLET, FILM COATED ORAL at 18:06

## 2023-09-07 RX ADMIN — HYDRALAZINE HYDROCHLORIDE 10 MG: 20 INJECTION INTRAMUSCULAR; INTRAVENOUS at 11:20

## 2023-09-07 RX ADMIN — POTASSIUM CHLORIDE 10 MEQ: 7.46 INJECTION, SOLUTION INTRAVENOUS at 17:06

## 2023-09-07 RX ADMIN — PROCHLORPERAZINE MALEATE 5 MG: 5 TABLET ORAL at 20:57

## 2023-09-07 RX ADMIN — PROCHLORPERAZINE EDISYLATE 5 MG: 5 INJECTION INTRAMUSCULAR; INTRAVENOUS at 11:20

## 2023-09-07 RX ADMIN — TERAZOSIN HYDROCHLORIDE 1 MG: 1 CAPSULE ORAL at 20:33

## 2023-09-07 RX ADMIN — INSULIN LISPRO 2 UNITS: 100 INJECTION, SOLUTION INTRAVENOUS; SUBCUTANEOUS at 20:34

## 2023-09-07 RX ADMIN — INSULIN DETEMIR 5 UNITS: 100 INJECTION, SOLUTION SUBCUTANEOUS at 09:29

## 2023-09-07 RX ADMIN — AMLODIPINE BESYLATE 10 MG: 10 TABLET ORAL at 08:15

## 2023-09-07 RX ADMIN — FAMOTIDINE 20 MG: 10 INJECTION INTRAVENOUS at 08:16

## 2023-09-07 RX ADMIN — RIVAROXABAN 15 MG: 15 TABLET, FILM COATED ORAL at 18:06

## 2023-09-07 RX ADMIN — ONDANSETRON 4 MG: 2 INJECTION INTRAMUSCULAR; INTRAVENOUS at 09:25

## 2023-09-07 RX ADMIN — OXYCODONE HYDROCHLORIDE AND ACETAMINOPHEN 1 TABLET: 5; 325 TABLET ORAL at 18:26

## 2023-09-07 RX ADMIN — CARVEDILOL 6.25 MG: 6.25 TABLET, FILM COATED ORAL at 08:15

## 2023-09-07 RX ADMIN — ONDANSETRON 4 MG: 2 INJECTION INTRAMUSCULAR; INTRAVENOUS at 22:27

## 2023-09-07 RX ADMIN — SODIUM CHLORIDE, POTASSIUM CHLORIDE, SODIUM LACTATE AND CALCIUM CHLORIDE 75 ML/HR: 600; 310; 30; 20 INJECTION, SOLUTION INTRAVENOUS at 08:13

## 2023-09-07 RX ADMIN — Medication 10 ML: at 08:16

## 2023-09-07 RX ADMIN — HYDRALAZINE HYDROCHLORIDE 10 MG: 20 INJECTION INTRAMUSCULAR; INTRAVENOUS at 17:05

## 2023-09-07 RX ADMIN — MIRTAZAPINE 7.5 MG: 15 TABLET, FILM COATED ORAL at 20:33

## 2023-09-07 RX ADMIN — FERROUS SULFATE TAB 325 MG (65 MG ELEMENTAL FE) 325 MG: 325 (65 FE) TAB at 08:15

## 2023-09-07 RX ADMIN — MULTIVITAMIN TABLET 1 TABLET: TABLET at 08:15

## 2023-09-07 RX ADMIN — Medication 5 MG: at 20:33

## 2023-09-07 RX ADMIN — POTASSIUM CHLORIDE 40 MEQ: 1500 TABLET, EXTENDED RELEASE ORAL at 09:28

## 2023-09-07 NOTE — PROGRESS NOTES
"Pharmacy Consult-Vancomycin Dosing  Thelma Michael is a  62 y.o. female receiving vancomycin therapy.     Indication:bacteremia  Consulting Provider: hospitalist  ID Consult:     Goal Trough: 15-20    Current Antimicrobial Therapy  Anti-Infectives (From admission, onward)      Ordered     Dose/Rate Route Frequency Start Stop    09/07/23 0331  vancomycin 1250 mg/250 mL 0.9% NS IVPB ([facility])        Ordering Provider: Vanessa Francisco APRN    20 mg/kg × 59.7 kg  over 75 Minutes Intravenous Once 09/07/23 0430      09/07/23 0331  Pharmacy to dose vancomycin        Ordering Provider: Vanessa Francisco APRN     Does not apply Continuous PRN 09/07/23 0329 09/12/23 0328    09/06/23 1233  fosfomycin (MONUROL) packet 3 g        Ordering Provider: Faith Nelson MD    3 g Oral Once 09/06/23 1330 09/06/23 1332    09/06/23 1039  vancomycin (VANCOCIN) capsule 125 mg        Ordering Provider: Faith Nelson MD    125 mg Oral 2 Times Daily 09/06/23 1130 09/16/23 0859            Allergies  Allergies as of 09/05/2023    (No Known Allergies)       Labs    Results from last 7 days   Lab Units 09/06/23  0901 09/05/23  2320   BUN mg/dL 27* 23   CREATININE mg/dL 1.61* 1.22*       Results from last 7 days   Lab Units 09/06/23  0859 09/05/23  2320   WBC 10*3/mm3 4.84 7.29       Evaluation of Dosing     Last Dose Received in the ED/Outside Facility: none  Is Patient on Dialysis or Renal Replacement: no    Ht - 172.7 cm (68\")  Wt - 59.7 kg (131 lb 11.2 oz)    Estimated Creatinine Clearance: 33.3 mL/min (A) (by C-G formula based on SCr of 1.61 mg/dL (H)).    Intake & Output (last 3 days)         09/04 0701 09/05 0700 09/05 0701 09/06 0700 09/06 0701 09/07 0700    P.O.   700    I.V. (mL/kg)   800 (13.4)    IV Piggyback  1000     Total Intake(mL/kg)  1000 (16.8) 1500 (25.1)    Urine (mL/kg/hr)   0 (0)    Total Output   0    Net  +1000 +1500           Urine Unmeasured Occurrence   3 x            Microbiology and Radiology  Microbiology Results (last " 10 days)       Procedure Component Value - Date/Time    Respiratory Panel PCR w/COVID-19(SARS-CoV-2) [facility] In-House, NP Swab in UTM/VTM, 3-4 HR TAT - Swab, Nasopharynx [[id number]]  (Normal) Collected: 09/06/23 0620    Lab Status: Final result Specimen: Swab from Nasopharynx Updated: 09/06/23 0744     ADENOVIRUS, PCR Not Detected     Coronavirus 229E Not Detected     Coronavirus HKU1 Not Detected     Coronavirus NL63 Not Detected     Coronavirus OC43 Not Detected     COVID19 Not Detected     Human Metapneumovirus Not Detected     Human Rhinovirus/Enterovirus Not Detected     Influenza A PCR Not Detected     Influenza B PCR Not Detected     Parainfluenza Virus 1 Not Detected     Parainfluenza Virus 2 Not Detected     Parainfluenza Virus 3 Not Detected     Parainfluenza Virus 4 Not Detected     RSV, PCR Not Detected     Bordetella pertussis pcr Not Detected     Bordetella parapertussis PCR Not Detected     Chlamydophila pneumoniae PCR Not Detected     Mycoplasma pneumo by PCR Not Detected    Narrative:      In the setting of a positive respiratory panel with a viral infection PLUS a negative procalcitonin without other underlying concern for bacterial infection, consider observing off antibiotics or discontinuation of antibiotics and continue supportive care. If the respiratory panel is positive for atypical bacterial infection (Bordetella pertussis, Chlamydophila pneumoniae, or Mycoplasma pneumoniae), consider antibiotic de-escalation to target atypical bacterial infection.    Blood Culture - Blood, Arm, Left [[id number]]  (Abnormal) Collected: 09/06/23 0600    Lab Status: Preliminary result Specimen: Blood from Arm, Left Updated: 09/06/23 2340     Blood Culture Abnormal Stain     Gram Stain Aerobic Bottle Gram positive cocci in groups    Narrative:      Aerobic bottle only      Blood Culture ID, PCR - Blood, Arm, Left [[id number]]  (Abnormal) Collected: 09/06/23 0600    Lab Status: Final result  Specimen: Blood from Arm, Left Updated: 09/07/23 0133     BCID, PCR Staph spp, not aureus or lugdunensis. Identification by BCID2 PCR.     BOTTLE TYPE Aerobic Bottle    Blood Culture - Blood, Arm, Left [[id number]]  (Abnormal) Collected: 09/06/23 0550    Lab Status: Preliminary result Specimen: Blood from Arm, Left Updated: 09/07/23 0146     Blood Culture Abnormal Stain     Gram Stain Aerobic Bottle Gram positive cocci in groups    Narrative:      Aerobic bottle only      Blood Culture ID, PCR - Blood, Arm, Left [[id number]]  (Abnormal) Collected: 09/06/23 0550    Lab Status: Final result Specimen: Blood from Arm, Left Updated: 09/07/23 0324     BCID, PCR Staph spp, not aureus or lugdunensis. Identification by BCID2 PCR.     BOTTLE TYPE Aerobic Bottle    Urine Culture - Urine, Urine, Clean Catch [[id number]]  (Normal) Collected: 09/05/23 2309    Lab Status: Preliminary result Specimen: Urine, Clean Catch Updated: 09/06/23 1047     Urine Culture Culture in progress            Vancomycin Levels:                        Assessment/Plan:  The patient will be started on a bolus of 20mg/kg for a dose of 1250mg . Given the patient's current renal status, will dose per random levels and obtain a random level with morning labs before ordering another dose.  Due to infection severity, will target a trough of 15-20.    Pharmacy will continue to follow the patient’s culture results and clinical progress daily.    Souleymane Bonilla, FrandyD

## 2023-09-07 NOTE — PROGRESS NOTES
[facility]  PROGRESS NOTE    Patient Name: Thelma Michael  : 1960  MRN: [id number]    Date of Admission: 2023  Primary Care Physician: Monet Howe APRN    Subjective   Subjective     CC:  N/v    HPI:  Lying in bed on side with head covered. Has a headache. Has been vomiting in addition to loose stools this morning. Doesn't feel well. No abd pain. Not able to take in very much by mouth, but was able to hold down some liquids yesterday.     ROS:  Gen- No fevers, chills  CV- No chest pain, palpitations  Resp- No cough, dyspnea  GI-see above     Objective   Objective     Vital Signs:   Temp:  [98.4 °F (36.9 °C)-99.1 °F (37.3 °C)] 98.8 °F (37.1 °C)  Heart Rate:  [67-84] 81  Resp:  [18] 18  BP: (127-181)/() 181/83     Physical Exam:  Constitutional: No acute distress, awake, alert, ill appearing   HENT: NCAT, mucous membranes moist  Respiratory: Clear to auscultation bilaterally, respiratory effort normal   Cardiovascular: RRR, no murmurs, rubs, or gallops  Gastrointestinal: Positive bowel sounds, soft, nontender, nondistended  Musculoskeletal: No bilateral ankle edema  Psychiatric: Appropriate affect, cooperative  Neurologic: Oriented x 3, strength symmetric in all extremities, Cranial Nerves grossly intact to confrontation, speech clear  Skin: No rashes     Results Reviewed:  LAB RESULTS:      Lab 23  0859 23  0610 23  2320   WBC 4.84  --  7.29   HEMOGLOBIN 11.5*  --  13.0   HEMATOCRIT 34.6  --  39.1   PLATELETS 349  --  404   NEUTROS ABS 2.73  --  6.14   IMMATURE GRANS (ABS) 0.05  --  0.02   LYMPHS ABS 1.23  --  0.83   MONOS ABS 0.82  --  0.29   EOS ABS 0.00  --  0.00   MCV 88.5  --  86.9   PROCALCITONIN  --   --  0.17   LACTATE  --  1.0  --          Lab 23  0436 23  0901 23  0859 23  2320   SODIUM 143  --  146*  --  144   POTASSIUM 3.4* 3.7 3.3*  --  3.2*   CHLORIDE 111*  --   110*  --  103   CO2 21.0*  --  21.0*  --  19.0*   ANION GAP 11.0  --  15.0  --  22.0*   BUN 20  --  27*  --  23   CREATININE 1.26*  --  1.61*  --  1.22*   EGFR 47.8*  --  35.6*  --  49.7*   GLUCOSE 58*  --  162*  --  316*   CALCIUM 8.3*  --  8.4*  --  9.1   MAGNESIUM 2.0  --   --   --   --    PHOSPHORUS 2.6  --   --   --   --    HEMOGLOBIN A1C  --   --   --  11.10*  --          Lab 09/05/23  2320   TOTAL PROTEIN 8.2   ALBUMIN 4.0   GLOBULIN 4.2   ALT (SGPT) 13   AST (SGOT) 36*   BILIRUBIN 0.5   ALK PHOS 111   LIPASE 26                     Lab 09/06/23  0426   PH, ARTERIAL 7.436   PCO2, ARTERIAL 32.2*   PO2 ART 83.3   FIO2 21   HCO3 ART 21.7   BASE EXCESS ART -1.9*   CARBOXYHEMOGLOBIN 0.9     Brief Urine Lab Results  (Last result in the past 365 days)        Color   Clarity   Blood   Leuk Est   Nitrite   Protein   CREAT   Urine HCG        09/05/23 2309 Yellow   Turbid   Large (3+)   Moderate (2+)   Negative   >=300 mg/dL (3+)                   Microbiology Results Abnormal       Procedure Component Value - Date/Time    Respiratory Panel PCR w/COVID-19(SARS-CoV-2) [facility] In-House, NP Swab in UTM/[facility], 3-4 HR TAT - Swab, Nasopharynx [[id number]]  (Normal) Collected: 09/06/23 0620    Lab Status: Final result Specimen: Swab from Nasopharynx Updated: 09/06/23 0771     ADENOVIRUS, PCR Not Detected     Coronavirus 229E Not Detected     Coronavirus HKU1 Not Detected     Coronavirus NL63 Not Detected     Coronavirus OC43 Not Detected     COVID19 Not Detected     Human Metapneumovirus Not Detected     Human Rhinovirus/Enterovirus Not Detected     Influenza A PCR Not Detected     Influenza B PCR Not Detected     Parainfluenza Virus 1 Not Detected     Parainfluenza Virus 2 Not Detected     Parainfluenza Virus 3 Not Detected     Parainfluenza Virus 4 Not Detected     RSV, PCR Not Detected     Bordetella pertussis pcr Not Detected     Bordetella parapertussis PCR Not Detected     Chlamydophila pneumoniae PCR Not  Detected     Mycoplasma pneumo by PCR Not Detected    Narrative:      In the setting of a positive respiratory panel with a viral infection PLUS a negative procalcitonin without other underlying concern for bacterial infection, consider observing off antibiotics or discontinuation of antibiotics and continue supportive care. If the respiratory panel is positive for atypical bacterial infection (Bordetella pertussis, Chlamydophila pneumoniae, or Mycoplasma pneumoniae), consider antibiotic de-escalation to target atypical bacterial infection.            CT Abdomen Pelvis Without Contrast    Result Date: 9/6/2023  CT ABDOMEN PELVIS WO CONTRAST Date of Exam: 9/6/2023 12:19 AM EDT Indication: Abdominal pain, acute, nonlocalized. Comparison: 4/30/2023. Technique: Axial CT images were obtained of the abdomen and pelvis without the administration of contrast. Reconstructed coronal and sagittal images were also obtained. Automated exposure control and iterative construction methods were used. Findings: Lung Bases:   The visualized lung bases and lower mediastinal structures are unremarkable. Limited evaluation of the solid organs due to lack of intravenous contrast. Liver: Liver is normal in size and CT density. No focal lesions. Biliary/Gallbladder:  The gallbladder is normal without evidence of radiopaque stones. The biliary tree is nondilated. Spleen: Spleen is normal in size and CT density. Pancreas:  Pancreas is normal. There is no evidence of pancreatic mass or peripancreatic fluid. Kidneys:  Kidneys are normal in size. There are no stones or hydronephrosis. Adrenals:  There is thickening of the bilateral adrenal glands likely related to hyperplasia, similar as compared to the previous study.. Retroperitoneal/Lymph Nodes/Vasculature:  No retroperitoneal adenopathy is identified. Gastrointestinal/Mesentery:  The bowel loops are non-dilated without wall thickening or mass. The appendix appears within normal limits. No  evidence of obstruction. No free air. No mesenteric fluid collections identified. Gastric stimulator device seen within the anterior soft tissues on the right. No significant stool burden identified. Scattered diverticula are present. No significant inflammatory changes. Bladder:  Circumferential bladder wall thickening is present which appears similar as compared to the previous study. Bladder diverticulum present along the superior margin. Genital:   Unremarkable       Bony Structures:   Visualized bony structures are consistent with the patient's age.     Impression: Impression: 1. No acute intra-abdominal or intrapelvic process. Circumferential bladder wall thickening is present likely related to chronic cystitis or outlet obstruction, similar as compared to previous studies. 2. Ancillary findings as described above.. Electronically Signed: Anita Caraballo MD  9/6/2023 12:38 AM EDT  Workstation ID: [id number]    XR Chest 1 View    Result Date: 9/6/2023  XR CHEST 1 VW Date of Exam: 9/6/2023 5:05 AM EDT Indication: cough Comparison: None available. Findings: There are no airspace consolidations. No pleural fluid. No pneumothorax. The pulmonary vasculature appears within normal limits. The cardiac and mediastinal silhouette appear unremarkable. No acute osseous abnormality identified.     Impression: Impression: No acute cardiopulmonary process. Electronically Signed: Anita Caraballo MD  9/6/2023 5:33 AM EDT  Workstation ID: [id number]     Results for orders placed during the hospital encounter of 12/06/22    Adult Transthoracic Echo Complete W/ Cont if Necessary Per Protocol    Interpretation Summary    Left ventricular ejection fraction appears to be greater than 70%.    Left ventricular wall thickness is consistent with moderate concentric hypertrophy.    Left ventricular diastolic function is consistent with (grade I) impaired relaxation.    Estimated right ventricular systolic pressure from tricuspid regurgitation is  mildly elevated (35-45 mmHg). Calculated right ventricular systolic pressure from tricuspid regurgitation is 39 mmHg.      Current medications:  Scheduled Meds:amLODIPine, 10 mg, Oral, Q24H  carvedilol, 6.25 mg, Oral, BID With Meals  famotidine, 20 mg, Intravenous, Daily  ferrous sulfate, 325 mg, Oral, Daily With Breakfast  insulin lispro, 2-7 Units, Subcutaneous, 4x Daily AC & at Bedtime  lactobacillus acidophilus, 1 capsule, Oral, Daily  Lidocaine, 1 patch, Transdermal, Q24H  mirtazapine, 7.5 mg, Oral, Nightly  multivitamin, 1 tablet, Oral, Daily  potassium chloride ER, 40 mEq, Oral, Q4H  rivaroxaban, 15 mg, Oral, Daily With Dinner  sodium chloride, 10 mL, Intravenous, Q12H  terazosin, 1 mg, Oral, Nightly  vancomycin (dosing per levels), , Does not apply, Daily      Continuous Infusions:lactated ringers, 75 mL/hr, Last Rate: 75 mL/hr (09/07/23 1125)  niCARdipine, 5-15 mg/hr, Last Rate: Stopped (09/06/23 0510)  Pharmacy to dose vancomycin,       PRN Meds:.  dextrose    dextrose    glucagon (human recombinant)    hydrALAZINE    melatonin    ondansetron **OR** ondansetron    oxyCODONE-acetaminophen    Pharmacy to dose vancomycin    Potassium Replacement - Follow Nurse / BPA Driven Protocol    prochlorperazine **OR** prochlorperazine **OR** prochlorperazine    [COMPLETED] Insert Peripheral IV **AND** sodium chloride    sodium chloride    sodium chloride    Assessment & Plan   Assessment & Plan     Active Hospital Problems    Diagnosis  POA    **Hypertensive urgency [I16.0]  Yes    Acute UTI (urinary tract infection) [N39.0]  Yes    Nausea vomiting and diarrhea [R11.2, R19.7]  Yes    PAF (paroxysmal atrial fibrillation) [I48.0]  Yes    Anxiety associated with depression [F41.8]  Yes    Clavicle fracture [S42.009A]  Yes    CKD (chronic kidney disease) stage 3, GFR 30-59 ml/min [N18.30]  Yes    Hypokalemia [E87.6]  Yes    Gastroparesis [K31.84]  Yes    Uncontrolled type 1 diabetes mellitus with hyperglycemia [E10.65]   Yes    Hyperlipidemia [E78.5]  Yes    Hypertension [I10]  Yes      Resolved Hospital Problems   No resolved problems to display.        Brief Hospital Course to date:  Thelma Michael is a 62 y.o. female w/ a hx of poorly controlled type 1 diabetes, Afib on Xarelto, iron deficiency anemia, gastroparesis s/p gastric stimulator, HTN, HLD, migraines, chronic urinary retention (previously performed self-catheterization), strokes (chronic right lentiform nucleus and left cerebellar lacunar infarcts found on MRI in February of 2023), tobacco abuse and GERD who presented to the emergency room with complaints of nausea, vomiting and diarrhea.      **Positive blood cultures 2/2   -possibly contaminant, started on Vancomycin and monitor   --consider ID consultation   --repeat BC obtained today and will monitor - if negative in 24 hours can consider letting patient DC home if feeling better        **Hypertensive urgency   -Cardene infusion weaned off   -continue routine meds including Coreg, Norvasc  --started on Terazosin nightly   -holding routine Losartan   -consider Cardiology consult     **Hyperglycemia   **T1DM  -ABG w/ CO2 32; anion gap 22.0  -initial glucose 316  -s/p 10 units regular insulin   -holding levemir- restart when tolerating PO as had a hypoglycemic event overnight with minimal oral intake   -FSBG q ac/hs w/ sliding scale   -diabetic educator consult        **Nausea, vomiting, diarrhea   **Hx of gastroparesis   -CT abd/pel without acute findings other than bladder wall thickening c/w chronic cystitis   -continue NS @ 75ml/hour   -clear liquid diet- advance as tolerated   --GI PCR + Enteroaggregative E.coli -- cont with supportive care      **Acute UTI   **Hx of neurogenic bladder (previously self-cath'd)  -UA w/ 4+ bacteria, 31-50 WBCs   -urine culture > 100k GNB  -previous urine culture + for enterococcus faecalis in 4/23 (previous results w/ mixed sameera)  -IVF  -IV Rocephin started; then treated with  one time dose Fosfomycin 9/6/23   -bladder scan q 4      **Hypokalemia   -replace per protocol      **CKD Stage III  -baseline CR 1.2-1.3  -current 1.2   -IVF  -monitor closely with N/V/D and minimal oral intake      **PAF   -continue coreg, Xarelto         Expected Discharge Location and Transportation: home   Expected Discharge 1-2 days pending BC results   Expected Discharge Date: 9/8/2023; Expected Discharge Time:      DVT prophylaxis:  Medical DVT prophylaxis orders are present.          CODE STATUS:   Code Status and Medical Interventions:   Ordered at: 09/06/23 0529     Code Status (Patient has no pulse and is not breathing):    CPR (Attempt to Resuscitate)     Medical Interventions (Patient has pulse or is breathing):    Full Support       Susan Cohen, APRN  09/07/23

## 2023-09-08 LAB
ALBUMIN SERPL-MCNC: 3.2 G/DL (ref 3.5–5.2)
ALBUMIN/GLOB SERPL: 1 G/DL
ALP SERPL-CCNC: 77 U/L (ref 39–117)
ALT SERPL W P-5'-P-CCNC: 10 U/L (ref 1–33)
ANION GAP SERPL CALCULATED.3IONS-SCNC: 14 MMOL/L (ref 5–15)
ANION GAP SERPL CALCULATED.3IONS-SCNC: 15 MMOL/L (ref 5–15)
AST SERPL-CCNC: 14 U/L (ref 1–32)
B PARAPERT DNA SPEC QL NAA+PROBE: NOT DETECTED
B PERT DNA SPEC QL NAA+PROBE: NOT DETECTED
BACTERIA SPEC AEROBE CULT: ABNORMAL
BASOPHILS # BLD AUTO: 0.01 10*3/MM3 (ref 0–0.2)
BASOPHILS NFR BLD AUTO: 0.2 % (ref 0–1.5)
BILIRUB SERPL-MCNC: 0.4 MG/DL (ref 0–1.2)
BUN SERPL-MCNC: 17 MG/DL (ref 8–23)
BUN SERPL-MCNC: 17 MG/DL (ref 8–23)
BUN/CREAT SERPL: 13.1 (ref 7–25)
BUN/CREAT SERPL: 13.4 (ref 7–25)
C PNEUM DNA NPH QL NAA+NON-PROBE: NOT DETECTED
CALCIUM SPEC-SCNC: 8.5 MG/DL (ref 8.6–10.5)
CALCIUM SPEC-SCNC: 8.6 MG/DL (ref 8.6–10.5)
CHLORIDE SERPL-SCNC: 108 MMOL/L (ref 98–107)
CHLORIDE SERPL-SCNC: 110 MMOL/L (ref 98–107)
CO2 SERPL-SCNC: 19 MMOL/L (ref 22–29)
CO2 SERPL-SCNC: 20 MMOL/L (ref 22–29)
CREAT SERPL-MCNC: 1.27 MG/DL (ref 0.57–1)
CREAT SERPL-MCNC: 1.3 MG/DL (ref 0.57–1)
D-LACTATE SERPL-SCNC: 0.7 MMOL/L (ref 0.5–2)
DEPRECATED RDW RBC AUTO: 47.7 FL (ref 37–54)
EGFRCR SERPLBLD CKD-EPI 2021: 46 ML/MIN/1.73
EGFRCR SERPLBLD CKD-EPI 2021: 47.3 ML/MIN/1.73
EOSINOPHIL # BLD AUTO: 0 10*3/MM3 (ref 0–0.4)
EOSINOPHIL NFR BLD AUTO: 0 % (ref 0.3–6.2)
ERYTHROCYTE [DISTWIDTH] IN BLOOD BY AUTOMATED COUNT: 14.3 % (ref 12.3–15.4)
FLUAV SUBTYP SPEC NAA+PROBE: NOT DETECTED
FLUBV RNA ISLT QL NAA+PROBE: NOT DETECTED
GLOBULIN UR ELPH-MCNC: 3.3 GM/DL
GLUCOSE BLDC GLUCOMTR-MCNC: 127 MG/DL (ref 70–130)
GLUCOSE BLDC GLUCOMTR-MCNC: 133 MG/DL (ref 70–130)
GLUCOSE BLDC GLUCOMTR-MCNC: 151 MG/DL (ref 70–130)
GLUCOSE BLDC GLUCOMTR-MCNC: 156 MG/DL (ref 70–130)
GLUCOSE SERPL-MCNC: 124 MG/DL (ref 65–99)
GLUCOSE SERPL-MCNC: 127 MG/DL (ref 65–99)
HADV DNA SPEC NAA+PROBE: NOT DETECTED
HCOV 229E RNA SPEC QL NAA+PROBE: NOT DETECTED
HCOV HKU1 RNA SPEC QL NAA+PROBE: NOT DETECTED
HCOV NL63 RNA SPEC QL NAA+PROBE: NOT DETECTED
HCOV OC43 RNA SPEC QL NAA+PROBE: NOT DETECTED
HCT VFR BLD AUTO: 36.3 % (ref 34–46.6)
HGB BLD-MCNC: 11.6 G/DL (ref 12–15.9)
HMPV RNA NPH QL NAA+NON-PROBE: NOT DETECTED
HPIV1 RNA ISLT QL NAA+PROBE: NOT DETECTED
HPIV2 RNA SPEC QL NAA+PROBE: NOT DETECTED
HPIV3 RNA NPH QL NAA+PROBE: NOT DETECTED
HPIV4 P GENE NPH QL NAA+PROBE: NOT DETECTED
IMM GRANULOCYTES # BLD AUTO: 0.02 10*3/MM3 (ref 0–0.05)
IMM GRANULOCYTES NFR BLD AUTO: 0.3 % (ref 0–0.5)
LYMPHOCYTES # BLD AUTO: 1.89 10*3/MM3 (ref 0.7–3.1)
LYMPHOCYTES NFR BLD AUTO: 29.2 % (ref 19.6–45.3)
M PNEUMO IGG SER IA-ACNC: NOT DETECTED
MAGNESIUM SERPL-MCNC: 1.8 MG/DL (ref 1.6–2.4)
MCH RBC QN AUTO: 28.8 PG (ref 26.6–33)
MCHC RBC AUTO-ENTMCNC: 32 G/DL (ref 31.5–35.7)
MCV RBC AUTO: 90.1 FL (ref 79–97)
MONOCYTES # BLD AUTO: 0.67 10*3/MM3 (ref 0.1–0.9)
MONOCYTES NFR BLD AUTO: 10.4 % (ref 5–12)
NEUTROPHILS NFR BLD AUTO: 3.88 10*3/MM3 (ref 1.7–7)
NEUTROPHILS NFR BLD AUTO: 59.9 % (ref 42.7–76)
NRBC BLD AUTO-RTO: 0 /100 WBC (ref 0–0.2)
PLAT MORPH BLD: NORMAL
PLATELET # BLD AUTO: 337 10*3/MM3 (ref 140–450)
PMV BLD AUTO: 9.7 FL (ref 6–12)
POTASSIUM SERPL-SCNC: 3.9 MMOL/L (ref 3.5–5.2)
POTASSIUM SERPL-SCNC: 3.9 MMOL/L (ref 3.5–5.2)
PROCALCITONIN SERPL-MCNC: 0.15 NG/ML (ref 0–0.25)
PROT SERPL-MCNC: 6.5 G/DL (ref 6–8.5)
RBC # BLD AUTO: 4.03 10*6/MM3 (ref 3.77–5.28)
RBC MORPH BLD: NORMAL
RHINOVIRUS RNA SPEC NAA+PROBE: NOT DETECTED
RSV RNA NPH QL NAA+NON-PROBE: NOT DETECTED
SARS-COV-2 RNA NPH QL NAA+NON-PROBE: NOT DETECTED
SODIUM SERPL-SCNC: 141 MMOL/L (ref 136–145)
SODIUM SERPL-SCNC: 145 MMOL/L (ref 136–145)
VANCOMYCIN SERPL-MCNC: 8.3 MCG/ML (ref 5–40)
WBC MORPH BLD: NORMAL
WBC NRBC COR # BLD: 6.47 10*3/MM3 (ref 3.4–10.8)

## 2023-09-08 PROCEDURE — 83605 ASSAY OF LACTIC ACID: CPT | Performed by: FAMILY MEDICINE

## 2023-09-08 PROCEDURE — 99232 SBSQ HOSP IP/OBS MODERATE 35: CPT | Performed by: NURSE PRACTITIONER

## 2023-09-08 PROCEDURE — 85025 COMPLETE CBC W/AUTO DIFF WBC: CPT | Performed by: FAMILY MEDICINE

## 2023-09-08 PROCEDURE — 84145 PROCALCITONIN (PCT): CPT | Performed by: FAMILY MEDICINE

## 2023-09-08 PROCEDURE — 25010000002 HYDRALAZINE PER 20 MG: Performed by: FAMILY MEDICINE

## 2023-09-08 PROCEDURE — 82948 REAGENT STRIP/BLOOD GLUCOSE: CPT

## 2023-09-08 PROCEDURE — 80202 ASSAY OF VANCOMYCIN: CPT

## 2023-09-08 PROCEDURE — 0202U NFCT DS 22 TRGT SARS-COV-2: CPT | Performed by: FAMILY MEDICINE

## 2023-09-08 PROCEDURE — G0378 HOSPITAL OBSERVATION PER HR: HCPCS

## 2023-09-08 PROCEDURE — 63710000001 INSULIN LISPRO (HUMAN) PER 5 UNITS: Performed by: NURSE PRACTITIONER

## 2023-09-08 PROCEDURE — 83735 ASSAY OF MAGNESIUM: CPT | Performed by: FAMILY MEDICINE

## 2023-09-08 PROCEDURE — 0 POTASSIUM CHLORIDE 10 MEQ/100ML SOLUTION

## 2023-09-08 PROCEDURE — 80053 COMPREHEN METABOLIC PANEL: CPT

## 2023-09-08 PROCEDURE — 85007 BL SMEAR W/DIFF WBC COUNT: CPT | Performed by: FAMILY MEDICINE

## 2023-09-08 RX ORDER — CARVEDILOL 12.5 MG/1
12.5 TABLET ORAL 2 TIMES DAILY WITH MEALS
Status: DISCONTINUED | OUTPATIENT
Start: 2023-09-08 | End: 2023-09-18 | Stop reason: HOSPADM

## 2023-09-08 RX ORDER — CARVEDILOL 6.25 MG/1
6.25 TABLET ORAL ONCE
Status: COMPLETED | OUTPATIENT
Start: 2023-09-08 | End: 2023-09-08

## 2023-09-08 RX ORDER — ACETAMINOPHEN 325 MG/1
650 TABLET ORAL EVERY 6 HOURS PRN
Status: DISCONTINUED | OUTPATIENT
Start: 2023-09-08 | End: 2023-09-18 | Stop reason: HOSPADM

## 2023-09-08 RX ADMIN — POTASSIUM CHLORIDE 10 MEQ: 7.46 INJECTION, SOLUTION INTRAVENOUS at 00:15

## 2023-09-08 RX ADMIN — MIRTAZAPINE 7.5 MG: 15 TABLET, FILM COATED ORAL at 20:53

## 2023-09-08 RX ADMIN — OXYCODONE HYDROCHLORIDE AND ACETAMINOPHEN 1 TABLET: 5; 325 TABLET ORAL at 06:36

## 2023-09-08 RX ADMIN — Medication 1 CAPSULE: at 08:16

## 2023-09-08 RX ADMIN — INSULIN LISPRO 2 UNITS: 100 INJECTION, SOLUTION INTRAVENOUS; SUBCUTANEOUS at 11:48

## 2023-09-08 RX ADMIN — HYDRALAZINE HYDROCHLORIDE 10 MG: 20 INJECTION INTRAMUSCULAR; INTRAVENOUS at 10:23

## 2023-09-08 RX ADMIN — Medication 10 ML: at 08:18

## 2023-09-08 RX ADMIN — AMLODIPINE BESYLATE 10 MG: 10 TABLET ORAL at 08:17

## 2023-09-08 RX ADMIN — ACETAMINOPHEN 650 MG: 325 TABLET, FILM COATED ORAL at 16:41

## 2023-09-08 RX ADMIN — LIDOCAINE 1 PATCH: 560 PATCH PERCUTANEOUS; TOPICAL; TRANSDERMAL at 08:24

## 2023-09-08 RX ADMIN — INSULIN LISPRO 2 UNITS: 100 INJECTION, SOLUTION INTRAVENOUS; SUBCUTANEOUS at 20:52

## 2023-09-08 RX ADMIN — TERAZOSIN HYDROCHLORIDE 1 MG: 1 CAPSULE ORAL at 20:53

## 2023-09-08 RX ADMIN — RIVAROXABAN 15 MG: 15 TABLET, FILM COATED ORAL at 17:13

## 2023-09-08 RX ADMIN — Medication 5 MG: at 20:53

## 2023-09-08 RX ADMIN — Medication 10 ML: at 21:17

## 2023-09-08 RX ADMIN — CARVEDILOL 6.25 MG: 6.25 TABLET, FILM COATED ORAL at 08:16

## 2023-09-08 RX ADMIN — FERROUS SULFATE TAB 325 MG (65 MG ELEMENTAL FE) 325 MG: 325 (65 FE) TAB at 08:17

## 2023-09-08 RX ADMIN — MULTIVITAMIN TABLET 1 TABLET: TABLET at 08:17

## 2023-09-08 RX ADMIN — CARVEDILOL 6.25 MG: 6.25 TABLET, FILM COATED ORAL at 11:48

## 2023-09-08 RX ADMIN — CARVEDILOL 12.5 MG: 12.5 TABLET, FILM COATED ORAL at 17:13

## 2023-09-08 RX ADMIN — FAMOTIDINE 20 MG: 10 INJECTION INTRAVENOUS at 09:40

## 2023-09-08 NOTE — PROGRESS NOTES
[facility] Medicine Services  PROGRESS NOTE    Patient Name: Thelma Michael  : 1960  MRN: [id number]    Date of Admission: 2023  Primary Care Physician: Monet Howe APRN    Subjective   Subjective     CC:  N/v    HPI:  Feels better today. No emesis or BM since yesterday. Tolerating small amounts of food. No abd pain. Feels she may be able to go home tomorrow.     ROS:  Gen- No fevers, chills  CV- No chest pain, palpitations  Resp- No cough, dyspnea  GI-see above     Objective   Objective     Vital Signs:   Temp:  [98.6 °F (37 °C)-100.4 °F (38 °C)] 100.4 °F (38 °C)  Heart Rate:  [] 93  Resp:  [16-18] 16  BP: (149-201)/(68-93) 177/72     Physical Exam:  Constitutional: No acute distress, awake, alert, frail   HENT: NCAT, mucous membranes moist  Respiratory: Clear to auscultation bilaterally, respiratory effort normal   Cardiovascular: RRR, no murmurs, rubs, or gallops  Gastrointestinal: Positive bowel sounds, soft, nontender, nondistended  Musculoskeletal: No bilateral ankle edema  Psychiatric: Appropriate affect, cooperative  Neurologic: Oriented x 3, strength symmetric in all extremities, Cranial Nerves grossly intact to confrontation, speech clear  Skin: No rashes     Results Reviewed:  LAB RESULTS:      Lab 23  0859 23  0610 23  2320   WBC 4.84  --  7.29   HEMOGLOBIN 11.5*  --  13.0   HEMATOCRIT 34.6  --  39.1   PLATELETS 349  --  404   NEUTROS ABS 2.73  --  6.14   IMMATURE GRANS (ABS) 0.05  --  0.02   LYMPHS ABS 1.23  --  0.83   MONOS ABS 0.82  --  0.29   EOS ABS 0.00  --  0.00   MCV 88.5  --  86.9   PROCALCITONIN  --   --  0.17   LACTATE  --  1.0  --          Lab 23  0511 23  1612 23  0436 234 23  0901 23  0859 23  8401   SODIUM 141  --  143  --  146*  --  144   POTASSIUM 3.9 3.6 3.4* 3.7 3.3*  --  3.2*   CHLORIDE 108*  --  111*  --  110*  --  103   CO2 19.0*  --  21.0*  --  21.0*   --  19.0*   ANION GAP 14.0  --  11.0  --  15.0  --  22.0*   BUN 17  --  20  --  27*  --  23   CREATININE 1.27*  --  1.26*  --  1.61*  --  1.22*   EGFR 47.3*  --  47.8*  --  35.6*  --  49.7*   GLUCOSE 124*  --  58*  --  162*  --  316*   CALCIUM 8.5*  --  8.3*  --  8.4*  --  9.1   MAGNESIUM  --   --  2.0  --   --   --   --    PHOSPHORUS  --   --  2.6  --   --   --   --    HEMOGLOBIN A1C  --   --   --   --   --  11.10*  --          Lab 09/05/23  2320   TOTAL PROTEIN 8.2   ALBUMIN 4.0   GLOBULIN 4.2   ALT (SGPT) 13   AST (SGOT) 36*   BILIRUBIN 0.5   ALK PHOS 111   LIPASE 26                     Lab 09/06/23  0426   PH, ARTERIAL 7.436   PCO2, ARTERIAL 32.2*   PO2 ART 83.3   FIO2 21   HCO3 ART 21.7   BASE EXCESS ART -1.9*   CARBOXYHEMOGLOBIN 0.9     Brief Urine Lab Results  (Last result in the past 365 days)        Color   Clarity   Blood   Leuk Est   Nitrite   Protein   CREAT   Urine HCG        09/05/23 2309 Yellow   Turbid   Large (3+)   Moderate (2+)   Negative   >=300 mg/dL (3+)                   Microbiology Results Abnormal       Procedure Component Value - Date/Time    Blood Culture - Blood, Hand, Left [[id number]]  (Normal) Collected: 09/07/23 0752    Lab Status: Preliminary result Specimen: Blood from Hand, Left Updated: 09/08/23 0830     Blood Culture No growth at 24 hours    Narrative:      Aerobic bottle only      Blood Culture - Blood, Hand, Right [[id number]]  (Normal) Collected: 09/07/23 0750    Lab Status: Preliminary result Specimen: Blood from Hand, Right Updated: 09/08/23 0815     Blood Culture No growth at 24 hours    Respiratory Panel PCR w/COVID-19(SARS-CoV-2) NORMA/JUAN ALBERTO/EDY/PAD/[facility]/MAD/ROSALINA In-House, NP Swab in UTM/VTM, 3-4 HR TAT - Swab, Nasopharynx [[id number]]  (Normal) Collected: 09/06/23 0620    Lab Status: Final result Specimen: Swab from Nasopharynx Updated: 09/06/23 0744     ADENOVIRUS, PCR Not Detected     Coronavirus 229E Not Detected     Coronavirus HKU1 Not Detected     Coronavirus NL63 Not  Detected     Coronavirus OC43 Not Detected     COVID19 Not Detected     Human Metapneumovirus Not Detected     Human Rhinovirus/Enterovirus Not Detected     Influenza A PCR Not Detected     Influenza B PCR Not Detected     Parainfluenza Virus 1 Not Detected     Parainfluenza Virus 2 Not Detected     Parainfluenza Virus 3 Not Detected     Parainfluenza Virus 4 Not Detected     RSV, PCR Not Detected     Bordetella pertussis pcr Not Detected     Bordetella parapertussis PCR Not Detected     Chlamydophila pneumoniae PCR Not Detected     Mycoplasma pneumo by PCR Not Detected    Narrative:      In the setting of a positive respiratory panel with a viral infection PLUS a negative procalcitonin without other underlying concern for bacterial infection, consider observing off antibiotics or discontinuation of antibiotics and continue supportive care. If the respiratory panel is positive for atypical bacterial infection (Bordetella pertussis, Chlamydophila pneumoniae, or Mycoplasma pneumoniae), consider antibiotic de-escalation to target atypical bacterial infection.            No radiology results from the last 24 hrs    Results for orders placed during the hospital encounter of 12/06/22    Adult Transthoracic Echo Complete W/ Cont if Necessary Per Protocol    Interpretation Summary    Left ventricular ejection fraction appears to be greater than 70%.    Left ventricular wall thickness is consistent with moderate concentric hypertrophy.    Left ventricular diastolic function is consistent with (grade I) impaired relaxation.    Estimated right ventricular systolic pressure from tricuspid regurgitation is mildly elevated (35-45 mmHg). Calculated right ventricular systolic pressure from tricuspid regurgitation is 39 mmHg.      Current medications:  Scheduled Meds:amLODIPine, 10 mg, Oral, Q24H  carvedilol, 12.5 mg, Oral, BID With Meals  famotidine, 20 mg, Intravenous, Daily  ferrous sulfate, 325 mg, Oral, Daily With  Breakfast  insulin lispro, 2-7 Units, Subcutaneous, 4x Daily AC & at Bedtime  lactobacillus acidophilus, 1 capsule, Oral, Daily  Lidocaine, 1 patch, Transdermal, Q24H  mirtazapine, 7.5 mg, Oral, Nightly  multivitamin, 1 tablet, Oral, Daily  rivaroxaban, 15 mg, Oral, Daily With Dinner  sodium chloride, 10 mL, Intravenous, Q12H  terazosin, 1 mg, Oral, Nightly      Continuous Infusions:niCARdipine, 5-15 mg/hr, Last Rate: Stopped (09/06/23 0510)      PRN Meds:.  dextrose    dextrose    glucagon (human recombinant)    hydrALAZINE    melatonin    ondansetron **OR** ondansetron    oxyCODONE-acetaminophen    Potassium Replacement - Follow Nurse / BPA Driven Protocol    prochlorperazine **OR** prochlorperazine **OR** prochlorperazine    [COMPLETED] Insert Peripheral IV **AND** sodium chloride    sodium chloride    sodium chloride    Assessment & Plan   Assessment & Plan     Active Hospital Problems    Diagnosis  POA    **Hypertensive urgency [I16.0]  Yes    Acute UTI (urinary tract infection) [N39.0]  Yes    Nausea vomiting and diarrhea [R11.2, R19.7]  Yes    PAF (paroxysmal atrial fibrillation) [I48.0]  Yes    Anxiety associated with depression [F41.8]  Yes    Clavicle fracture [S42.009A]  Yes    CKD (chronic kidney disease) stage 3, GFR 30-59 ml/min [N18.30]  Yes    Hypokalemia [E87.6]  Yes    Gastroparesis [K31.84]  Yes    Uncontrolled type 1 diabetes mellitus with hyperglycemia [E10.65]  Yes    Hyperlipidemia [E78.5]  Yes    Hypertension [I10]  Yes      Resolved Hospital Problems   No resolved problems to display.        Brief Hospital Course to date:  Thelma Michael is a 62 y.o. female w/ a hx of poorly controlled type 1 diabetes, Afib on Xarelto, iron deficiency anemia, gastroparesis s/p gastric stimulator, HTN, HLD, migraines, chronic urinary retention (previously performed self-catheterization), strokes (chronic right lentiform nucleus and left cerebellar lacunar infarcts found on MRI in February of 2023),  tobacco abuse and GERD who presented to the emergency room with complaints of nausea, vomiting and diarrhea.      **Positive blood cultures 2/2; suspected contam  -contaminant, stopped Vanc   --repeat BC obtained-  negative in 24 hours        **Hypertensive urgency   -Cardene infusion weaned off   -continue routine meds including Coreg, Norvasc  --started on Terazosin nightly   -holding routine Losartan   -consider Cardiology consult   --increased Coreg today     **Hyperglycemia   **T1DM  -ABG w/ CO2 32; anion gap 22.0  -initial glucose 316  -s/p 10 units regular insulin   -holding levemir- restart when tolerating PO as had a hypoglycemic event overnight with minimal oral intake   -FSBG q ac/hs w/ sliding scale   -diabetic educator consult        **Nausea, vomiting, diarrhea   **Hx of gastroparesis   -CT abd/pel without acute findings other than bladder wall thickening c/w chronic cystitis   -continue NS @ 75ml/hour   -clear liquid diet- advance as tolerated   --GI PCR + Enteroaggregative E.coli -- cont with supportive care   --mild improvement in n/v/d in last 24 hours, advanced diet and likely home in AM      **Acute UTI   **Hx of neurogenic bladder (previously self-cath'd)  -UA w/ 4+ bacteria, 31-50 WBCs   -urine culture > 100k GNB  -previous urine culture + for enterococcus faecalis in 4/23 (previous results w/ mixed sameera)  -IVF  -IV Rocephin started; then treated with one time dose Fosfomycin 9/6/23   -bladder scan q 4      **Hypokalemia   -replace per protocol      **CKD Stage III  -baseline CR 1.2-1.3  -current 1.2   -IVF now stopped   -monitor closely with N/V/D and minimal oral intake      **PAF   -continue coreg, Xarelto         Expected Discharge Location and Transportation: home   Expected Discharge 1-2 days pending BC results   Expected Discharge Date: 9/8/2023; Expected Discharge Time:      DVT prophylaxis:  Medical DVT prophylaxis orders are present.          CODE STATUS:   Code Status and Medical  Interventions:   Ordered at: 09/06/23 0529     Code Status (Patient has no pulse and is not breathing):    CPR (Attempt to Resuscitate)     Medical Interventions (Patient has pulse or is breathing):    Full Support       Susan Cohen, APRN  09/08/23

## 2023-09-08 NOTE — PLAN OF CARE
Goal Outcome Evaluation:         VSS throughout shift. Patient alert/oriented. NSR on monitor, on room air. IV access obtained this shift per house supervisor. No acute events overnight.

## 2023-09-08 NOTE — CASE MANAGEMENT/SOCIAL WORK
Continued Stay Note  [facility]     Patient Name: Thelma Michael  MRN: [id number]  Today's Date: 9/8/2023    Admit Date: 9/5/2023    Plan: discharge plan   Discharge Plan       Row Name 09/08/23 1311       Plan    Plan discharge plan    Plan Comments Plan remains home with son at discharge and currently denies discharge needs. CM will cont to follow    Final Discharge Disposition Code 01 - home or self-care                   Discharge Codes    No documentation.                 Expected Discharge Date and Time       Expected Discharge Date Expected Discharge Time    Sep 8, 2023               Nicky Baltazar RN

## 2023-09-09 LAB
D-LACTATE SERPL-SCNC: 1 MMOL/L (ref 0.5–2)
GLUCOSE BLDC GLUCOMTR-MCNC: 132 MG/DL (ref 70–130)
GLUCOSE BLDC GLUCOMTR-MCNC: 140 MG/DL (ref 70–130)
GLUCOSE BLDC GLUCOMTR-MCNC: 321 MG/DL (ref 70–130)
GLUCOSE BLDC GLUCOMTR-MCNC: 342 MG/DL (ref 70–130)

## 2023-09-09 PROCEDURE — 99232 SBSQ HOSP IP/OBS MODERATE 35: CPT | Performed by: FAMILY MEDICINE

## 2023-09-09 PROCEDURE — 83605 ASSAY OF LACTIC ACID: CPT | Performed by: PHYSICIAN ASSISTANT

## 2023-09-09 PROCEDURE — 25010000002 CEFTRIAXONE PER 250 MG: Performed by: PHYSICIAN ASSISTANT

## 2023-09-09 PROCEDURE — 63710000001 INSULIN LISPRO (HUMAN) PER 5 UNITS: Performed by: NURSE PRACTITIONER

## 2023-09-09 PROCEDURE — 82948 REAGENT STRIP/BLOOD GLUCOSE: CPT

## 2023-09-09 PROCEDURE — 63710000001 INSULIN DETEMIR PER 5 UNITS: Performed by: FAMILY MEDICINE

## 2023-09-09 PROCEDURE — 25010000002 DAPTOMYCIN PER 1 MG: Performed by: INTERNAL MEDICINE

## 2023-09-09 PROCEDURE — 87040 BLOOD CULTURE FOR BACTERIA: CPT | Performed by: PHYSICIAN ASSISTANT

## 2023-09-09 PROCEDURE — G0378 HOSPITAL OBSERVATION PER HR: HCPCS

## 2023-09-09 RX ORDER — HYDRALAZINE HYDROCHLORIDE 50 MG/1
50 TABLET, FILM COATED ORAL EVERY 8 HOURS SCHEDULED
Status: DISCONTINUED | OUTPATIENT
Start: 2023-09-09 | End: 2023-09-18 | Stop reason: HOSPADM

## 2023-09-09 RX ORDER — VANCOMYCIN HYDROCHLORIDE 125 MG/1
125 CAPSULE ORAL 2 TIMES DAILY
Status: DISCONTINUED | OUTPATIENT
Start: 2023-09-09 | End: 2023-09-18 | Stop reason: HOSPADM

## 2023-09-09 RX ORDER — IBUPROFEN 400 MG/1
400 TABLET ORAL EVERY 6 HOURS SCHEDULED
Status: DISCONTINUED | OUTPATIENT
Start: 2023-09-09 | End: 2023-09-12

## 2023-09-09 RX ADMIN — TERAZOSIN HYDROCHLORIDE 1 MG: 1 CAPSULE ORAL at 20:21

## 2023-09-09 RX ADMIN — HYDRALAZINE HYDROCHLORIDE 50 MG: 50 TABLET, FILM COATED ORAL at 13:23

## 2023-09-09 RX ADMIN — MULTIVITAMIN TABLET 1 TABLET: TABLET at 09:19

## 2023-09-09 RX ADMIN — OXYCODONE HYDROCHLORIDE AND ACETAMINOPHEN 1 TABLET: 5; 325 TABLET ORAL at 18:26

## 2023-09-09 RX ADMIN — DAPTOMYCIN 350 MG: 500 INJECTION, POWDER, LYOPHILIZED, FOR SOLUTION INTRAVENOUS at 20:21

## 2023-09-09 RX ADMIN — LIDOCAINE 1 PATCH: 560 PATCH PERCUTANEOUS; TOPICAL; TRANSDERMAL at 09:33

## 2023-09-09 RX ADMIN — ACETAMINOPHEN 650 MG: 325 TABLET, FILM COATED ORAL at 06:55

## 2023-09-09 RX ADMIN — Medication 1 CAPSULE: at 09:19

## 2023-09-09 RX ADMIN — ACETAMINOPHEN 650 MG: 325 TABLET, FILM COATED ORAL at 00:32

## 2023-09-09 RX ADMIN — ACETAMINOPHEN 650 MG: 325 TABLET, FILM COATED ORAL at 23:40

## 2023-09-09 RX ADMIN — NICARDIPINE HYDROCHLORIDE 5 MG/HR: 25 INJECTION, SOLUTION INTRAVENOUS at 07:00

## 2023-09-09 RX ADMIN — FERROUS SULFATE TAB 325 MG (65 MG ELEMENTAL FE) 325 MG: 325 (65 FE) TAB at 09:19

## 2023-09-09 RX ADMIN — INSULIN DETEMIR 10 UNITS: 100 INJECTION, SOLUTION SUBCUTANEOUS at 15:23

## 2023-09-09 RX ADMIN — RIVAROXABAN 15 MG: 15 TABLET, FILM COATED ORAL at 17:03

## 2023-09-09 RX ADMIN — CARVEDILOL 12.5 MG: 12.5 TABLET, FILM COATED ORAL at 17:03

## 2023-09-09 RX ADMIN — ACETAMINOPHEN 650 MG: 325 TABLET, FILM COATED ORAL at 17:03

## 2023-09-09 RX ADMIN — VANCOMYCIN HYDROCHLORIDE 125 MG: 125 CAPSULE ORAL at 20:22

## 2023-09-09 RX ADMIN — NICARDIPINE HYDROCHLORIDE 5 MG/HR: 25 INJECTION, SOLUTION INTRAVENOUS at 10:24

## 2023-09-09 RX ADMIN — CARVEDILOL 12.5 MG: 12.5 TABLET, FILM COATED ORAL at 09:19

## 2023-09-09 RX ADMIN — VANCOMYCIN HYDROCHLORIDE 125 MG: 125 CAPSULE ORAL at 13:23

## 2023-09-09 RX ADMIN — INSULIN LISPRO 5 UNITS: 100 INJECTION, SOLUTION INTRAVENOUS; SUBCUTANEOUS at 13:23

## 2023-09-09 RX ADMIN — Medication 10 ML: at 09:20

## 2023-09-09 RX ADMIN — AMLODIPINE BESYLATE 10 MG: 10 TABLET ORAL at 09:19

## 2023-09-09 RX ADMIN — NICARDIPINE HYDROCHLORIDE 5 MG/HR: 25 INJECTION, SOLUTION INTRAVENOUS at 15:30

## 2023-09-09 RX ADMIN — FAMOTIDINE 20 MG: 10 INJECTION INTRAVENOUS at 09:33

## 2023-09-09 RX ADMIN — MIRTAZAPINE 7.5 MG: 15 TABLET, FILM COATED ORAL at 20:21

## 2023-09-09 RX ADMIN — IBUPROFEN 400 MG: 400 TABLET, FILM COATED ORAL at 23:40

## 2023-09-09 RX ADMIN — SODIUM CHLORIDE 2000 MG: 900 INJECTION INTRAVENOUS at 13:23

## 2023-09-09 RX ADMIN — INSULIN LISPRO 5 UNITS: 100 INJECTION, SOLUTION INTRAVENOUS; SUBCUTANEOUS at 17:02

## 2023-09-09 RX ADMIN — IBUPROFEN 400 MG: 400 TABLET, FILM COATED ORAL at 19:04

## 2023-09-09 RX ADMIN — HYDRALAZINE HYDROCHLORIDE 50 MG: 50 TABLET, FILM COATED ORAL at 09:19

## 2023-09-09 NOTE — CONSULTS
INFECTIOUS DISEASE CONSULT/INITIAL HOSPITAL VISIT    Thelma Michael  1960  [id number]    Date of Consult: 9/9/2023    Admission Date: 9/5/2023      Requesting Provider: Skip Jimenez DO  Evaluating Physician: Pedro Tucker MD    Reason for Consultation: febrile, recent diarrhea/E. coli infection, ? Contaminated blood cultures    History of present illness:    Patient is a 62 y.o. female with h/o T1DM/poorly controlled, afib/Xarelto, anemia, gastroparesis/gastric stimulator, , GERD, TIAs, HLD, HTN, CKD Stage IIIa (baseline 0.9-1.25), migraines, CVA, malnutrition, former tobacco abuse/quit 1 year ago, chronic urinary retention/not self cathing for over a year, smokes marijuana occasionally for back pain control and appetite stimulator, and recent distal left clavicle fracture 9/1/23 who presented to [facility] ED on 9/5 for nausea, vomiting, and diarrhea for 2 days prior to arrival.  On arrival, she had 3 episodes of diarrhea in ED with decrease oral intake.  She denied fever, chills, shortness of breath, or dysuria.  She denies any hematemesis, hematochezia, or melena.  She had blood on toilet paper and has h/o hemorrhoids.  She has had fevers up to 102.5 last night.  Labs were WBC 7300 with 84% neutrophils, PCT 0.17, creatinine 1.22, A1C 11.2, and UA WBC 31-50 with moderate LE and negative nitrite.  Urine culture is positive for E.coli (pan sens).  Blood cultures are positive in 2 of 2 sets (2 of 4 bottles) with CoNS.  A GI panel PCR was positive for Enteroaggregative E.coli.  A respiratory panel PCR is negative. A CXR on 9/5 showed no acute findings. A CT scan of a/p without contrast on 9/6 showed no acute findings.  She was on oral Vancomycin from 9/5 to 9/6.  She was given a dose of Rocephin on 9/6 and a diose of IV Vancomycin on 9/7.  She is currently not on any antibiotic therapy.  ID was asked to evaluate.      Of note: she has not had a Cdiff done on this visit, but she had a Cdiff test on 8/11/23  with PCR positive and toxin EIA negative.  She states they did do a test on this visit and it was negative. She also has had positive EAEC multiple times on 3/22/22, 3/26/22,12/10/22, 8/11/23, and now 9/7/23.  It appears that she has chronic issues with gastroparesis flares and diarrhea.  Her EAEC has never been treated because of her prolonged QT with Cipro and Azithromycin contraindicated.     Past Medical History:   Diagnosis Date    Acid reflux     Acute bronchitis     Cardiac murmur     Depression with anxiety 10/5/2020    Diabetes mellitus     Gastroesophageal reflux disease 5/13/2016    H/O echocardiogram 08/07/2012    i. LVEF 65%.ii. Mild LVH.iii. Borderline evidence of atrial septal aneurysm.  No PFO.     History of nuclear stress test 08/22/2014    Negative for ischemia and scars; LVEF 77%.      History of TIAs 7/15/2021    Hyperlipidemia     Hypertension     Impacted cerumen of both ears     Migraine     Migraines 10/31/2019    Self-catheterizes urinary bladder     Sinusitis     Stroke     Tobacco abuse     quit 4 days ago.      Urticaria     Vitamin D deficiency 5/13/2016       Past Surgical History:   Procedure Laterality Date    CAPSULE ENDOSCOPY  07/27/2021    Procedure: PILLCAM DEPLOYMENT;  Surgeon: Mikael Worthy MD;  Location:  [facility];  Service: Gastroenterology;;    COLONOSCOPY      COLONOSCOPY N/A 07/27/2021    Procedure: COLONOSCOPY;  Surgeon: Mikael Worthy MD;  Location:  [facility];  Service: Gastroenterology;  Laterality: N/A;    DENTAL PROCEDURE      ENDOSCOPY N/A 06/30/2021    Procedure: ESOPHAGOGASTRODUODENOSCOPY;  Surgeon: Brunner, Mark I, MD;  Location:  [facility];  Service: Gastroenterology;  Laterality: N/A;    ENDOSCOPY N/A 07/27/2021    Procedure: ESOPHAGOGASTRODUODENOSCOPY;  Surgeon: Mikael Worthy MD;  Location:  [facility];  Service: Gastroenterology;  Laterality: N/A;    ENDOSCOPY WITH JTUBE N/A 03/16/2022    Procedure:  ESOPHAGOGASTRODUODENOSCOPY WITH JEJUNAL TUBE INSERTION;  Surgeon: Thom Glover MD;  Location: [facility];  Service: Gastroenterology;  Laterality: N/A;    UPPER GASTROINTESTINAL ENDOSCOPY         Family History   Problem Relation Age of Onset    Anxiety disorder Other     Arthritis Other     ADD / ADHD Other     Heart disease Other         cardiac disorder    Depression Other     Diabetes Other     Hyperlipidemia Other     Hypertension Other     Lung cancer Other     Osteoporosis Other     Hypertension Brother     Diabetes Brother     Hyperlipidemia Mother     Hypertension Mother     Colon cancer Neg Hx        Social History     Socioeconomic History    Marital status:    Tobacco Use    Smoking status: Former     Packs/day: 0.25     Years: 30.00     Pack years: 7.50     Types: Cigarettes     Quit date: 2019     Years since quittin.2    Smokeless tobacco: Never    Tobacco comments:     BC PL never smoker    Vaping Use    Vaping Use: Never used   Substance and Sexual Activity    Alcohol use: Yes     Alcohol/week: 1.0 standard drink     Types: 1 Glasses of wine per week     Comment: ocassional    Drug use: Yes     Frequency: 2.0 times per week     Types: Marijuana    Sexual activity: Not Currently     Comment: Single        No Known Allergies      Medication:    Current Facility-Administered Medications:     acetaminophen (TYLENOL) tablet 650 mg, 650 mg, Oral, Q6H PRN, HARI Jimenez DO, 650 mg at 23 1703    amLODIPine (NORVASC) tablet 10 mg, 10 mg, Oral, Q24H, Magali Delvalle APRN, 10 mg at 23 0919    carvedilol (COREG) tablet 12.5 mg, 12.5 mg, Oral, BID With Meals, Susan Cohen, APRN, 12.5 mg at 23 1703    cefTRIAXone (ROCEPHIN) 2000 mg/100 mL 0.9% NS IVPB (MBP), 2,000 mg, Intravenous, Q24H, Rene García PA, Stopped at 23 1524    dextrose (D50W) (25 g/50 mL) IV injection 25 g, 25 g, Intravenous, Q15 Min PRN, Magali Delvalle APRN    dextrose (GLUTOSE) oral gel  15 g, 15 g, Oral, Q15 Min PRN, Magali Delvalle APRN    famotidine (PEPCID) injection 20 mg, 20 mg, Intravenous, Daily, Faith Nelson MD, 20 mg at 09/09/23 0933    ferrous sulfate tablet 325 mg, 325 mg, Oral, Daily With Breakfast, Magali Delvalle APRN, 325 mg at 09/09/23 0919    glucagon (GLUCAGEN) injection 1 mg, 1 mg, Intramuscular, Q15 Min PRN, Magali Delvalle APRN    hydrALAZINE (APRESOLINE) injection 10 mg, 10 mg, Intravenous, Q6H PRN, HARI Jimenez DO, 10 mg at 09/08/23 1023    hydrALAZINE (APRESOLINE) tablet 50 mg, 50 mg, Oral, Q8H, HARI Jimenez DO, 50 mg at 09/09/23 1323    ibuprofen (ADVIL,MOTRIN) tablet 400 mg, 400 mg, Oral, Q6H, HARI Jimenez DO    insulin detemir (LEVEMIR) injection 10 Units, 10 Units, Subcutaneous, Daily, HARI Jimenez DO, 10 Units at 09/09/23 1523    Insulin Lispro (humaLOG) injection 2-7 Units, 2-7 Units, Subcutaneous, 4x Daily AC & at Bedtime, Magali Delvalle APRN, 5 Units at 09/09/23 1702    lactobacillus acidophilus (RISAQUAD) capsule 1 capsule, 1 capsule, Oral, Daily, Magali Delvalle APRN, 1 capsule at 09/09/23 0919    Lidocaine 4 % 1 patch, 1 patch, Transdermal, Q24H, Faith Nelson MD, 1 patch at 09/09/23 0933    melatonin tablet 5 mg, 5 mg, Oral, Nightly PRN, Magali Delvalle APRN, 5 mg at 09/08/23 2053    mirtazapine (REMERON) tablet 7.5 mg, 7.5 mg, Oral, Nightly, Martin Martinez III, DO, 7.5 mg at 09/08/23 2053    multivitamin (THERAGRAN) tablet 1 tablet, 1 tablet, Oral, Daily, Magali Delvalle APRN, 1 tablet at 09/09/23 0919    niCARdipine (CARDENE) 25mg in 250mL NS infusion, 5-15 mg/hr, Intravenous, Titrated, Nas Patel MD, Last Rate: 50 mL/hr at 09/09/23 1826, 5 mg/hr at 09/09/23 1826    ondansetron (ZOFRAN) tablet 4 mg, 4 mg, Oral, Q6H PRN, 4 mg at 09/06/23 2105 **OR** ondansetron (ZOFRAN) injection 4 mg, 4 mg, Intravenous, Q6H PRN, Magali Delvalle, APRN, 4 mg at 09/07/23 2227    oxyCODONE-acetaminophen (PERCOCET) 5-325 MG per tablet 1 tablet, 1  tablet, Oral, Q6H PRN, Faith Nelson MD, 1 tablet at 09/09/23 1826    Potassium Replacement - Follow Nurse / BPA Driven Protocol, , Does not apply, PRN, Susan Cohen APRN    prochlorperazine (COMPAZINE) injection 5 mg, 5 mg, Intravenous, Q6H PRN, 5 mg at 09/07/23 1120 **OR** prochlorperazine (COMPAZINE) tablet 5 mg, 5 mg, Oral, Q6H PRN, 5 mg at 09/07/23 2057 **OR** prochlorperazine (COMPAZINE) suppository 25 mg, 25 mg, Rectal, Q12H PRN, HARI Jimenez,     rivaroxaban (XARELTO) tablet 15 mg, 15 mg, Oral, Daily With Dinner, Magali Delvalle APRN, 15 mg at 09/09/23 1703    [COMPLETED] Insert Peripheral IV, , , Once **AND** sodium chloride 0.9 % flush 10 mL, 10 mL, Intravenous, PRN, Nas Patel MD    sodium chloride 0.9 % flush 10 mL, 10 mL, Intravenous, Q12H, Magali Delvalle APRN, 10 mL at 09/09/23 0920    sodium chloride 0.9 % flush 10 mL, 10 mL, Intravenous, PRN, Magali Delvalle APRN    sodium chloride 0.9 % infusion 40 mL, 40 mL, Intravenous, PRN, Magali Delvalle APRN    terazosin (HYTRIN) capsule 1 mg, 1 mg, Oral, Nightly, Magali Delvalle APRN, 1 mg at 09/08/23 2053    vancomycin (VANCOCIN) capsule 125 mg, 125 mg, Oral, BID, Rene García PA, 125 mg at 09/09/23 1323    Antibiotics:  Anti-Infectives (From admission, onward)      Ordered     Dose/Rate Route Frequency Start Stop    09/09/23 1119  vancomycin (VANCOCIN) capsule 125 mg        Ordering Provider: Rene García PA    125 mg Oral 2 Times Daily 09/09/23 1300 09/19/23 0859    09/09/23 1119  cefTRIAXone (ROCEPHIN) 2000 mg/100 mL 0.9% NS IVPB (MBP)        Ordering Provider: Rene García PA    2,000 mg  over 30 Minutes Intravenous Every 24 Hours 09/09/23 1200 09/16/23 1159    09/07/23 0331  vancomycin 1250 mg/250 mL 0.9% NS IVPB ([facility])        Ordering Provider: Vanessa Francisco APRN    20 mg/kg × 59.7 kg  over 75 Minutes Intravenous Once 09/07/23 0430 09/07/23 0652    09/06/23 1233  fosfomycin (MONUROL) packet 3 g        Ordering Provider:  Faith Nelson MD    3 g Oral Once 23 1330 23 1332              Review of Systems:  Constitutional-- + Fever, chills, sweats.  Appetite poor, and no malaise. No fatigue.  HEENT-- No new vision, hearing or throat complaints.  No epistaxis or oral sores.  Denies odynophagia or dysphagia. No headache, photophobia or neck stiffness.  CV-- No chest pain, palpitation or syncope  Resp-- No SOB/cough/Hemoptysis  GI- + nausea, vomiting, watery/soft diarrhea.  No hematochezia, melena, or hematemesis. Denies jaundice or chronic liver disease.  -- No dysuria, hematuria, or flank pain.  Denies hesitancy, urgency or burning with urination. She has been urinating on her own now for over a year without the use of catheters.   Lymph- no swollen lymph nodes in neck/axilla or groin.   Heme- No active bruising or bleeding; no Hx of DVT or PE.  MS-- no swelling or pain in the bones or joints of arms/legs.  No new back pain.  Neuro-- No acute focal weakness or numbness in the arms or legs.  No seizures.  Skin--No rashes or lesions      Physical Exam:   Vital Signs  Temp (24hrs), Av.5 °F (38.6 °C), Min:98.6 °F (37 °C), Max:103.1 °F (39.5 °C)    Temp  Min: 98.6 °F (37 °C)  Max: 103.1 °F (39.5 °C)  BP  Min: 128/68  Max: 203/87  Pulse  Min: 81  Max: 107  Resp  Min: 16  Max: 16  SpO2  Min: 97 %  Max: 97 %    GENERAL: Awake and alert, in no acute distress.   HEENT: Normocephalic, atraumatic.  PERRL. EOMI. No conjunctival injection. No icterus. Oropharynx clear without evidence of thrush or exudate.   NECK: Supple without nuchal rigidity. No mass.  LYMPH: No cervical, axillary or inguinal lymphadenopathy.  HEART: RRR; No murmur, rubs, gallops.   LUNGS: Clear to auscultation bilaterally without wheezing, rales, rhonchi. Normal respiratory effort. Nonlabored.   ABDOMEN: Soft, nontender, nondistended. Positive bowel sounds. No rebound or guarding. NO mass or HSM.  EXT:  No cyanosis, clubbing or edema. No cord.  :  Without Ansari  catheter.  MSK: No joint effusions or erythema  SKIN: Warm and dry without cutaneous eruptions on Inspection/palpation.    NEURO: Oriented to PPT.  Motor 5/5 strength  PSYCHIATRIC: Normal insight and judgment. Cooperative with PE    Laboratory Data    Results from last 7 days   Lab Units 09/08/23  1626 09/06/23  0859 09/05/23  2320   WBC 10*3/mm3 6.47 4.84 7.29   HEMOGLOBIN g/dL 11.6* 11.5* 13.0   HEMATOCRIT % 36.3 34.6 39.1   PLATELETS 10*3/mm3 337 349 404     Results from last 7 days   Lab Units 09/08/23  1626   SODIUM mmol/L 145   POTASSIUM mmol/L 3.9   CHLORIDE mmol/L 110*   CO2 mmol/L 20.0*   BUN mg/dL 17   CREATININE mg/dL 1.30*   GLUCOSE mg/dL 127*   CALCIUM mg/dL 8.6     Results from last 7 days   Lab Units 09/08/23  1626   ALK PHOS U/L 77   BILIRUBIN mg/dL 0.4   ALT (SGPT) U/L 10   AST (SGOT) U/L 14             Results from last 7 days   Lab Units 09/09/23  1747   LACTATE mmol/L 1.0         Results from last 7 days   Lab Units 09/08/23  0511   VANCOMYCIN RM mcg/mL 8.30     Estimated Creatinine Clearance: 43 mL/min (A) (by C-G formula based on SCr of 1.3 mg/dL (H)).      Microbiology:  Microbiology Results (last 10 days)       Procedure Component Value - Date/Time    Respiratory Panel PCR w/COVID-19(SARS-CoV-2) [facility], NP Swab in UTM/VTM, 3-4 HR TAT - Swab, Nasopharynx [[id number]]  (Normal) Collected: 09/08/23 1714    Lab Status: Final result Specimen: Swab from Nasopharynx Updated: 09/08/23 1818     ADENOVIRUS, PCR Not Detected     Coronavirus 229E Not Detected     Coronavirus HKU1 Not Detected     Coronavirus NL63 Not Detected     Coronavirus OC43 Not Detected     COVID19 Not Detected     Human Metapneumovirus Not Detected     Human Rhinovirus/Enterovirus Not Detected     Influenza A PCR Not Detected     Influenza B PCR Not Detected     Parainfluenza Virus 1 Not Detected     Parainfluenza Virus 2 Not Detected     Parainfluenza Virus 3 Not Detected     Parainfluenza Virus 4 Not  Detected     RSV, PCR Not Detected     Bordetella pertussis pcr Not Detected     Bordetella parapertussis PCR Not Detected     Chlamydophila pneumoniae PCR Not Detected     Mycoplasma pneumo by PCR Not Detected    Narrative:      In the setting of a positive respiratory panel with a viral infection PLUS a negative procalcitonin without other underlying concern for bacterial infection, consider observing off antibiotics or discontinuation of antibiotics and continue supportive care. If the respiratory panel is positive for atypical bacterial infection (Bordetella pertussis, Chlamydophila pneumoniae, or Mycoplasma pneumoniae), consider antibiotic de-escalation to target atypical bacterial infection.    Gastrointestinal Panel, PCR - Stool, Per Rectum [[id number]]  (Abnormal) Collected: 09/07/23 1018    Lab Status: Final result Specimen: Stool from Per Rectum Updated: 09/07/23 1234     Campylobacter Not Detected     Plesiomonas shigelloides Not Detected     Salmonella Not Detected     Vibrio Not Detected     Vibrio cholerae Not Detected     Yersinia enterocolitica Not Detected     Enteroaggregative E. coli (EAEC) Detected     Enteropathogenic E. coli (EPEC) Not Detected     Enterotoxigenic E. coli (ETEC) lt/st Not Detected     Shiga-like toxin-producing E. coli (STEC) stx1/stx2 Not Detected     Shigella/Enteroinvasive E. coli (EIEC) Not Detected     Cryptosporidium Not Detected     Cyclospora cayetanensis Not Detected     Entamoeba histolytica Not Detected     Giardia lamblia Not Detected     Adenovirus F40/41 Not Detected     Astrovirus Not Detected     Norovirus GI/GII Not Detected     Rotavirus A Not Detected     Sapovirus (I, II, IV or V) Not Detected    Blood Culture - Blood, Hand, Left [[id number]]  (Normal) Collected: 09/07/23 0752    Lab Status: Preliminary result Specimen: Blood from Hand, Left Updated: 09/09/23 0815     Blood Culture No growth at 2 days    Narrative:      Aerobic bottle only      Blood Culture -  Blood, Hand, Right [[id number]]  (Normal) Collected: 09/07/23 0750    Lab Status: Preliminary result Specimen: Blood from Hand, Right Updated: 09/09/23 0815     Blood Culture No growth at 2 days    Respiratory Panel PCR w/COVID-19(SARS-CoV-2) [facility], NP Swab in UTM/VTM, 3-4 HR TAT - Swab, Nasopharynx [[id number]]  (Normal) Collected: 09/06/23 0620    Lab Status: Final result Specimen: Swab from Nasopharynx Updated: 09/06/23 0744     ADENOVIRUS, PCR Not Detected     Coronavirus 229E Not Detected     Coronavirus HKU1 Not Detected     Coronavirus NL63 Not Detected     Coronavirus OC43 Not Detected     COVID19 Not Detected     Human Metapneumovirus Not Detected     Human Rhinovirus/Enterovirus Not Detected     Influenza A PCR Not Detected     Influenza B PCR Not Detected     Parainfluenza Virus 1 Not Detected     Parainfluenza Virus 2 Not Detected     Parainfluenza Virus 3 Not Detected     Parainfluenza Virus 4 Not Detected     RSV, PCR Not Detected     Bordetella pertussis pcr Not Detected     Bordetella parapertussis PCR Not Detected     Chlamydophila pneumoniae PCR Not Detected     Mycoplasma pneumo by PCR Not Detected    Narrative:      In the setting of a positive respiratory panel with a viral infection PLUS a negative procalcitonin without other underlying concern for bacterial infection, consider observing off antibiotics or discontinuation of antibiotics and continue supportive care. If the respiratory panel is positive for atypical bacterial infection (Bordetella pertussis, Chlamydophila pneumoniae, or Mycoplasma pneumoniae), consider antibiotic de-escalation to target atypical bacterial infection.    Blood Culture - Blood, Arm, Left [[id number]]  (Abnormal) Collected: 09/06/23 0600    Lab Status: Preliminary result Specimen: Blood from Arm, Left Updated: 09/09/23 0623     Blood Culture Staphylococcus, coagulase negative     Isolated from Aerobic Bottle     Gram Stain Aerobic Bottle  Gram positive cocci in groups    Narrative:      Aerobic bottle only      Blood Culture ID, PCR - Blood, Arm, Left [[id number]]  (Abnormal) Collected: 09/06/23 0600    Lab Status: Final result Specimen: Blood from Arm, Left Updated: 09/07/23 0133     BCID, PCR Staph spp, not aureus or lugdunensis. Identification by BCID2 PCR.     BOTTLE TYPE Aerobic Bottle    Blood Culture - Blood, Arm, Left [[id number]]  (Abnormal) Collected: 09/06/23 0550    Lab Status: Preliminary result Specimen: Blood from Arm, Left Updated: 09/09/23 0622     Blood Culture Staphylococcus, coagulase negative     Isolated from Aerobic Bottle     Gram Stain Aerobic Bottle Gram positive cocci in groups    Narrative:      Aerobic bottle only      Blood Culture ID, PCR - Blood, Arm, Left [[id number]]  (Abnormal) Collected: 09/06/23 0550    Lab Status: Final result Specimen: Blood from Arm, Left Updated: 09/07/23 0324     BCID, PCR Staph spp, not aureus or lugdunensis. Identification by BCID2 PCR.     BOTTLE TYPE Aerobic Bottle    Urine Culture - Urine, Urine, Clean Catch [[id number]]  (Abnormal)  (Susceptibility) Collected: 09/05/23 2309    Lab Status: Final result Specimen: Urine, Clean Catch Updated: 09/08/23 0112     Urine Culture >100,000 CFU/mL Escherichia coli    Narrative:      Colonization of the urinary tract without infection is common. Treatment is discouraged unless the patient is symptomatic, pregnant, or undergoing an invasive urologic procedure.    Susceptibility        Escherichia coli      AMELIA      Ampicillin Susceptible      Ampicillin + Sulbactam Susceptible      Cefazolin Susceptible      Cefepime Susceptible      Ceftazidime Susceptible      Ceftriaxone Susceptible      Gentamicin Susceptible      Levofloxacin Susceptible      Nitrofurantoin Susceptible      Piperacillin + Tazobactam Susceptible      Trimethoprim + Sulfamethoxazole Susceptible                                         Radiology:    9/6/23 chest x-ray:  Findings:  There  are no airspace consolidations. No pleural fluid. No pneumothorax. The pulmonary vasculature appears within normal limits. The cardiac and mediastinal silhouette appear unremarkable. No acute osseous abnormality identified.   Impression:     Impression:  No acute cardiopulmonary process.       9/6/23 CT abdomen and pelvis without contrast:  Impression:     1. No acute intra-abdominal or intrapelvic process. Circumferential bladder wall thickening is present likely related to chronic cystitis or outlet obstruction, similar as compared to previous studies.   2. Ancillary findings as described above..       I (Dr. Tucker) independent read the patient's recent radiographs.    Impression:   SIRS criteria with hectic fevers and tachycardia- Unclear etiology but I suspect this is due to a urinary source.  She did recently have positive blood cultures but repeat blood cultures were no growth.  Unclear if her coagulase-negative staph was a contaminant versus true bacteremia. No signs of pneumonia clinically or on imaging. She did have recent C. Difficile colonization but not currently having any diarrhea. No acute intra-abdominal process on CT abdomen and pelvis recently. Note that her CT abdomen and pelvis was without contrast. She did have bladder wall thickening consistent with cystitis.  E.coli UTI.  Patient has not self cathed in over a year as she urinates on her own now.   Coagulase negative Staph bacteremia vs contamination.  Repeat blood cultures negative to date. Blood cultures from 9/6 were collected from the same arm but appeared to have been collected at slightly different times  Chronic recurrent Enteroaggregative E.coli positive diarrhea.  Usually self limiting, but likely colonizer.  Acute renal failure/CKD Stage IIIa, improved  H/o Cdiff 3/2022, also on 8/11, PCR was positive but toxin negative indicating colonization.  Type 1 diabetes mellitus, uncontrolled  Chronic Atrial  fibrillation/Xarelto  Iron-deficiency anemia  Gastroparesis/gastric stimulator with acute flares.  H/o cerebrovascular accident/TIAs  Ongoing tobacco abuse, just quit about a week ago.  Recurrent prolonged QT, avoid antimicrobials that may prolong QT such as Levaquin, Cipro, azithromycin, fluconazole.      PLAN/RECOMMENDATIONS:   Thank you for asking us to see Thelma Michael, I recommend the following:  - Continue to monitor CBC closely  - Vancomycin 125 mg PO BID for Cdiff prophylaxis  - Rocephin 2 GM IV daily for now.   - We will give a one-time dose of IV daptomycin given her coagulase-negative staph in blood cultures recently and her new fevers. I suspect this is more likely a contaminant but given she is febrile will give a 1 time dose for now and monitor   - Blood cultures x 2 since she is having new fevers since her last set of blood cultures    Pedro Tucker MD saw and examined patient, verified hx and PE, read all radiographic studies, reviewed labs and micro data, and formulated dx, plan for treatment and all medical decision making.      Rene García PA-C for Pedro Tucker MD    I (Dr. Tucker) independent obtained history, performed a physical exam, and formulated the above assessment and plan.  I reviewed the patient's labs, micro, radiographs, and medications today.  I edited the above notes reflect my findings.     Pedro Tucker MD  9/9/2023  18:54 EDT

## 2023-09-09 NOTE — PROGRESS NOTES
[facility] Medicine Services  PROGRESS NOTE    Patient Name: Thelma Michael  : 1960  MRN: [id number]    Date of Admission: 2023  Primary Care Physician: Monet Howe APRN    Subjective   Subjective     CC:  N/v    HPI:  Patient is a 62-year-old female seen and examined by me this a.m., she has had issues with fever since yesterday evening but has been intermittent.  Septic work-up has been negative thus far but does have known E. coli in previous stool studies.  Plans to consult infectious disease for further evaluation today and obtain UA.  COVID testing yesterday was negative as well as negative lactic acid and Pro-Eulalio. Patient continuing treatment for HTN ermergency at this time, blood pressure high this AM and continuing on cardene gtt at this time. Started patient on Hydralazine this AM PO as well in effort to wean gtt.     ROS:  Gen- No fevers, chills, no HA  CV- No chest pain, palpitations  Resp- No cough, dyspnea  GI-no current N/V/D, reports some mild abdominal pain      Objective   Objective     Vital Signs:   Temp:  [98.6 °F (37 °C)-102.5 °F (39.2 °C)] 98.6 °F (37 °C)  Heart Rate:  [] 84  Resp:  [16] 16  BP: (142-203)/(57-90) 145/67     Physical Exam:  Constitutional: No acute distress, awake, alert, frail   HENT: NCAT, nares patent, mucous membranes moist  Respiratory: Clear to auscultation bilaterally, respiratory effort normal   Cardiovascular: RRR, no murmurs, rubs, or gallops  Gastrointestinal: Positive bowel sounds, soft, nontender, nondistended  Musculoskeletal: No bilateral ankle edema, no clubbing or cyanosis  Psychiatric: Appropriate affect, cooperative  Neurologic: Oriented x 3, strength symmetric in all extremities, Cranial Nerves grossly intact to confrontation, speech clear  Skin: No rashes     Results Reviewed:  LAB RESULTS:      Lab 23  1626 23  0859 23  0610 23  2320   WBC 6.47 4.84  --  7.29    HEMOGLOBIN 11.6* 11.5*  --  13.0   HEMATOCRIT 36.3 34.6  --  39.1   PLATELETS 337 349  --  404   NEUTROS ABS 3.88 2.73  --  6.14   IMMATURE GRANS (ABS) 0.02 0.05  --  0.02   LYMPHS ABS 1.89 1.23  --  0.83   MONOS ABS 0.67 0.82  --  0.29   EOS ABS 0.00 0.00  --  0.00   MCV 90.1 88.5  --  86.9   PROCALCITONIN 0.15  --   --  0.17   LACTATE 0.7  --  1.0  --          Lab 09/08/23  1626 09/08/23  0511 09/07/23  1612 09/07/23  0436 09/06/23 2054 09/06/23  0901 09/06/23  0859 09/05/23  2320   SODIUM 145 141  --  143  --  146*  --  144   POTASSIUM 3.9 3.9 3.6 3.4* 3.7 3.3*  --  3.2*   CHLORIDE 110* 108*  --  111*  --  110*  --  103   CO2 20.0* 19.0*  --  21.0*  --  21.0*  --  19.0*   ANION GAP 15.0 14.0  --  11.0  --  15.0  --  22.0*   BUN 17 17  --  20  --  27*  --  23   CREATININE 1.30* 1.27*  --  1.26*  --  1.61*  --  1.22*   EGFR 46.0* 47.3*  --  47.8*  --  35.6*  --  49.7*   GLUCOSE 127* 124*  --  58*  --  162*  --  316*   CALCIUM 8.6 8.5*  --  8.3*  --  8.4*  --  9.1   MAGNESIUM 1.8  --   --  2.0  --   --   --   --    PHOSPHORUS  --   --   --  2.6  --   --   --   --    HEMOGLOBIN A1C  --   --   --   --   --   --  11.10*  --          Lab 09/08/23 1626 09/05/23  2320   TOTAL PROTEIN 6.5 8.2   ALBUMIN 3.2* 4.0   GLOBULIN 3.3 4.2   ALT (SGPT) 10 13   AST (SGOT) 14 36*   BILIRUBIN 0.4 0.5   ALK PHOS 77 111   LIPASE  --  26                     Lab 09/06/23  0426   PH, ARTERIAL 7.436   PCO2, ARTERIAL 32.2*   PO2 ART 83.3   FIO2 21   HCO3 ART 21.7   BASE EXCESS ART -1.9*   CARBOXYHEMOGLOBIN 0.9     Brief Urine Lab Results  (Last result in the past 365 days)        Color   Clarity   Blood   Leuk Est   Nitrite   Protein   CREAT   Urine HCG        09/05/23 2309 Yellow   Turbid   Large (3+)   Moderate (2+)   Negative   >=300 mg/dL (3+)                   Microbiology Results Abnormal       Procedure Component Value - Date/Time    Blood Culture - Blood, Hand, Left [[id number]]  (Normal) Collected: 09/07/23 0752    Lab Status:  Preliminary result Specimen: Blood from Hand, Left Updated: 09/09/23 0815     Blood Culture No growth at 2 days    Narrative:      Aerobic bottle only      Blood Culture - Blood, Hand, Right [[id number]]  (Normal) Collected: 09/07/23 0750    Lab Status: Preliminary result Specimen: Blood from Hand, Right Updated: 09/09/23 0815     Blood Culture No growth at 2 days    Respiratory Panel PCR w/COVID-19(SARS-CoV-2) [facility]AN [facility]BERTO/[facility]Y/[facility] In-House, NP Swab in UTM/VTM, 3-4 HR TAT - Swab, Nasopharynx [[id number]]  (Normal) Collected: 09/08/23 1714    Lab Status: Final result Specimen: Swab from Nasopharynx Updated: 09/08/23 1818     ADENOVIRUS, PCR Not Detected     Coronavirus 229E Not Detected     Coronavirus HKU1 Not Detected     Coronavirus NL63 Not Detected     Coronavirus OC43 Not Detected     COVID19 Not Detected     Human Metapneumovirus Not Detected     Human Rhinovirus/Enterovirus Not Detected     Influenza A PCR Not Detected     Influenza B PCR Not Detected     Parainfluenza Virus 1 Not Detected     Parainfluenza Virus 2 Not Detected     Parainfluenza Virus 3 Not Detected     Parainfluenza Virus 4 Not Detected     RSV, PCR Not Detected     Bordetella pertussis pcr Not Detected     Bordetella parapertussis PCR Not Detected     Chlamydophila pneumoniae PCR Not Detected     Mycoplasma pneumo by PCR Not Detected    Narrative:      In the setting of a positive respiratory panel with a viral infection PLUS a negative procalcitonin without other underlying concern for bacterial infection, consider observing off antibiotics or discontinuation of antibiotics and continue supportive care. If the respiratory panel is positive for atypical bacterial infection (Bordetella pertussis, Chlamydophila pneumoniae, or Mycoplasma pneumoniae), consider antibiotic de-escalation to target atypical bacterial infection.    Respiratory Panel PCR w/COVID-19(SARS-CoV-2) [facility]AN [facility]ERT[facility]Y/[facility] In-House, NP Swab in UTM/VTM, 3-4 HR TAT  - Swab, Nasopharynx [[id number]]  (Normal) Collected: 09/06/23 0620    Lab Status: Final result Specimen: Swab from Nasopharynx Updated: 09/06/23 0744     ADENOVIRUS, PCR Not Detected     Coronavirus 229E Not Detected     Coronavirus HKU1 Not Detected     Coronavirus NL63 Not Detected     Coronavirus OC43 Not Detected     COVID19 Not Detected     Human Metapneumovirus Not Detected     Human Rhinovirus/Enterovirus Not Detected     Influenza A PCR Not Detected     Influenza B PCR Not Detected     Parainfluenza Virus 1 Not Detected     Parainfluenza Virus 2 Not Detected     Parainfluenza Virus 3 Not Detected     Parainfluenza Virus 4 Not Detected     RSV, PCR Not Detected     Bordetella pertussis pcr Not Detected     Bordetella parapertussis PCR Not Detected     Chlamydophila pneumoniae PCR Not Detected     Mycoplasma pneumo by PCR Not Detected    Narrative:      In the setting of a positive respiratory panel with a viral infection PLUS a negative procalcitonin without other underlying concern for bacterial infection, consider observing off antibiotics or discontinuation of antibiotics and continue supportive care. If the respiratory panel is positive for atypical bacterial infection (Bordetella pertussis, Chlamydophila pneumoniae, or Mycoplasma pneumoniae), consider antibiotic de-escalation to target atypical bacterial infection.            No radiology results from the last 24 hrs    Results for orders placed during the hospital encounter of 12/06/22    Adult Transthoracic Echo Complete W/ Cont if Necessary Per Protocol    Interpretation Summary    Left ventricular ejection fraction appears to be greater than 70%.    Left ventricular wall thickness is consistent with moderate concentric hypertrophy.    Left ventricular diastolic function is consistent with (grade I) impaired relaxation.    Estimated right ventricular systolic pressure from tricuspid regurgitation is mildly elevated (35-45 mmHg). Calculated right  ventricular systolic pressure from tricuspid regurgitation is 39 mmHg.      Current medications:  Scheduled Meds:amLODIPine, 10 mg, Oral, Q24H  carvedilol, 12.5 mg, Oral, BID With Meals  cefTRIAXone, 2,000 mg, Intravenous, Q24H  famotidine, 20 mg, Intravenous, Daily  ferrous sulfate, 325 mg, Oral, Daily With Breakfast  hydrALAZINE, 50 mg, Oral, Q8H  insulin lispro, 2-7 Units, Subcutaneous, 4x Daily AC & at Bedtime  lactobacillus acidophilus, 1 capsule, Oral, Daily  Lidocaine, 1 patch, Transdermal, Q24H  mirtazapine, 7.5 mg, Oral, Nightly  multivitamin, 1 tablet, Oral, Daily  rivaroxaban, 15 mg, Oral, Daily With Dinner  sodium chloride, 10 mL, Intravenous, Q12H  terazosin, 1 mg, Oral, Nightly  vancomycin, 125 mg, Oral, BID      Continuous Infusions:niCARdipine, 5-15 mg/hr, Last Rate: 5 mg/hr (09/09/23 1024)      PRN Meds:.  acetaminophen    dextrose    dextrose    glucagon (human recombinant)    hydrALAZINE    melatonin    ondansetron **OR** ondansetron    oxyCODONE-acetaminophen    Potassium Replacement - Follow Nurse / BPA Driven Protocol    prochlorperazine **OR** prochlorperazine **OR** prochlorperazine    [COMPLETED] Insert Peripheral IV **AND** sodium chloride    sodium chloride    sodium chloride    Assessment & Plan   Assessment & Plan     Active Hospital Problems    Diagnosis  POA    **Hypertensive urgency [I16.0]  Yes    Acute UTI (urinary tract infection) [N39.0]  Yes    Nausea vomiting and diarrhea [R11.2, R19.7]  Yes    PAF (paroxysmal atrial fibrillation) [I48.0]  Yes    Anxiety associated with depression [F41.8]  Yes    Clavicle fracture [S42.009A]  Yes    CKD (chronic kidney disease) stage 3, GFR 30-59 ml/min [N18.30]  Yes    Hypokalemia [E87.6]  Yes    Gastroparesis [K31.84]  Yes    Uncontrolled type 1 diabetes mellitus with hyperglycemia [E10.65]  Yes    Hyperlipidemia [E78.5]  Yes    Hypertension [I10]  Yes      Resolved Hospital Problems   No resolved problems to display.        Brief Hospital  Course to date:  Thelma Michael is a 62 y.o. female w/ a hx of poorly controlled type 1 diabetes, Afib on Xarelto, iron deficiency anemia, gastroparesis s/p gastric stimulator, HTN, HLD, migraines, chronic urinary retention (previously performed self-catheterization), strokes (chronic right lentiform nucleus and left cerebellar lacunar infarcts found on MRI in February of 2023), tobacco abuse and GERD who presented to the emergency room with complaints of nausea, vomiting and diarrhea. Patient has had issues with fevers since the evening of 9/8, repeat blood cultures have been NGTD and no obvious new symptoms. Patient with negative procal and LA. Plans to consult ID for further evaluation. Patient has also had issues with recurrent hypertensive urgency, back on Cardene gtt this AM, plans to add PO hydralazine.       **Positive blood cultures 2/2; suspected contam  -contaminant, stopped Vanc   --repeat BC obtained-  negative in 24 hours   -- ID consulted and following, plans for again repeat blood cultures on 9/9  -- Patient has been restarted on IV Rocephin for UTI, repeat UA pending at this time, continue to follow.         **Hypertensive urgency   -Cardene infusion weaned off, restarted and now on again this AM on 9/9  - Plans to start patient on PO hydralazine  -continue routine meds including Coreg, Norvasc  --started on Terazosin nightly   -holding routine Losartan   --Coreg previously increased.     **Hyperglycemia   **T1DM  -ABG w/ CO2 32; anion gap 22.0  -initial glucose 316  -s/p 10 units regular insulin   - Levemir 10 U daily started  -FSBG q ac/hs w/ sliding scale   -diabetic educator consult        **Nausea, vomiting, diarrhea   **Hx of gastroparesis   -CT abd/pel without acute findings other than bladder wall thickening c/w chronic cystitis   -continue NS @ 75ml/hour   -clear liquid diet- advance as tolerated   --GI PCR + Enteroaggregative E.coli -- patient now with fever since the evening of 9/8,  ID consult has been requested, continue to follow.      **Acute UTI   **Hx of neurogenic bladder (previously self-cath'd)  -UA w/ 4+ bacteria, 31-50 WBCs   -urine culture > 100k GNB  -previous urine culture + for enterococcus faecalis in 4/23 (previous results w/ mixed sameera)  - Rocephin restarted on 9/9 per ID, continue to follow.   -bladder scan q 4      **Hypokalemia   -replace per protocol      **CKD Stage III  -baseline CR 1.2-1.3  -current 1.2   -IVF now stopped   -monitor closely with N/V/D and minimal oral intake      **PAF   -continue coreg, Xarelto         Expected Discharge Location and Transportation: home   Expected Discharge 1-2 days pending BC results   Expected Discharge Date: 9/8/2023; Expected Discharge Time:      DVT prophylaxis:  Medical DVT prophylaxis orders are present.          CODE STATUS:   Code Status and Medical Interventions:   Ordered at: 09/06/23 0529     Code Status (Patient has no pulse and is not breathing):    CPR (Attempt to Resuscitate)     Medical Interventions (Patient has pulse or is breathing):    Full Support       RUY Jimenez,   09/09/23

## 2023-09-10 LAB
ALBUMIN SERPL-MCNC: 2.7 G/DL (ref 3.5–5.2)
ALBUMIN/GLOB SERPL: 0.9 G/DL
ALP SERPL-CCNC: 62 U/L (ref 39–117)
ALT SERPL W P-5'-P-CCNC: 7 U/L (ref 1–33)
ANION GAP SERPL CALCULATED.3IONS-SCNC: 8 MMOL/L (ref 5–15)
AST SERPL-CCNC: 11 U/L (ref 1–32)
BACTERIA SPEC AEROBE CULT: ABNORMAL
BACTERIA SPEC AEROBE CULT: ABNORMAL
BASOPHILS # BLD AUTO: 0.03 10*3/MM3 (ref 0–0.2)
BASOPHILS NFR BLD AUTO: 0.4 % (ref 0–1.5)
BILIRUB SERPL-MCNC: 0.3 MG/DL (ref 0–1.2)
BUN SERPL-MCNC: 25 MG/DL (ref 8–23)
BUN/CREAT SERPL: 16.7 (ref 7–25)
CALCIUM SPEC-SCNC: 8.2 MG/DL (ref 8.6–10.5)
CHLORIDE SERPL-SCNC: 111 MMOL/L (ref 98–107)
CK SERPL-CCNC: 45 U/L (ref 20–180)
CO2 SERPL-SCNC: 22 MMOL/L (ref 22–29)
CREAT SERPL-MCNC: 1.5 MG/DL (ref 0.57–1)
DEPRECATED RDW RBC AUTO: 45.7 FL (ref 37–54)
EGFRCR SERPLBLD CKD-EPI 2021: 38.8 ML/MIN/1.73
EOSINOPHIL # BLD AUTO: 0.02 10*3/MM3 (ref 0–0.4)
EOSINOPHIL NFR BLD AUTO: 0.3 % (ref 0.3–6.2)
ERYTHROCYTE [DISTWIDTH] IN BLOOD BY AUTOMATED COUNT: 13.7 % (ref 12.3–15.4)
GLOBULIN UR ELPH-MCNC: 3 GM/DL
GLUCOSE BLDC GLUCOMTR-MCNC: 106 MG/DL (ref 70–130)
GLUCOSE BLDC GLUCOMTR-MCNC: 130 MG/DL (ref 70–130)
GLUCOSE BLDC GLUCOMTR-MCNC: 149 MG/DL (ref 70–130)
GLUCOSE BLDC GLUCOMTR-MCNC: 197 MG/DL (ref 70–130)
GLUCOSE SERPL-MCNC: 76 MG/DL (ref 65–99)
GRAM STN SPEC: ABNORMAL
GRAM STN SPEC: ABNORMAL
HCT VFR BLD AUTO: 32.4 % (ref 34–46.6)
HGB BLD-MCNC: 10.4 G/DL (ref 12–15.9)
IMM GRANULOCYTES # BLD AUTO: 0.04 10*3/MM3 (ref 0–0.05)
IMM GRANULOCYTES NFR BLD AUTO: 0.6 % (ref 0–0.5)
ISOLATED FROM: ABNORMAL
ISOLATED FROM: ABNORMAL
LYMPHOCYTES # BLD AUTO: 2.27 10*3/MM3 (ref 0.7–3.1)
LYMPHOCYTES NFR BLD AUTO: 33.5 % (ref 19.6–45.3)
MAGNESIUM SERPL-MCNC: 2.1 MG/DL (ref 1.6–2.4)
MCH RBC QN AUTO: 29.1 PG (ref 26.6–33)
MCHC RBC AUTO-ENTMCNC: 32.1 G/DL (ref 31.5–35.7)
MCV RBC AUTO: 90.8 FL (ref 79–97)
MONOCYTES # BLD AUTO: 0.73 10*3/MM3 (ref 0.1–0.9)
MONOCYTES NFR BLD AUTO: 10.8 % (ref 5–12)
NEUTROPHILS NFR BLD AUTO: 3.68 10*3/MM3 (ref 1.7–7)
NEUTROPHILS NFR BLD AUTO: 54.4 % (ref 42.7–76)
NRBC BLD AUTO-RTO: 0 /100 WBC (ref 0–0.2)
PLAT MORPH BLD: NORMAL
PLATELET # BLD AUTO: 306 10*3/MM3 (ref 140–450)
PMV BLD AUTO: 9.9 FL (ref 6–12)
POTASSIUM SERPL-SCNC: 3.4 MMOL/L (ref 3.5–5.2)
POTASSIUM SERPL-SCNC: 5.2 MMOL/L (ref 3.5–5.2)
PROT SERPL-MCNC: 5.7 G/DL (ref 6–8.5)
RBC # BLD AUTO: 3.57 10*6/MM3 (ref 3.77–5.28)
RBC MORPH BLD: NORMAL
SODIUM SERPL-SCNC: 141 MMOL/L (ref 136–145)
WBC MORPH BLD: NORMAL
WBC NRBC COR # BLD: 6.77 10*3/MM3 (ref 3.4–10.8)

## 2023-09-10 PROCEDURE — 80053 COMPREHEN METABOLIC PANEL: CPT | Performed by: FAMILY MEDICINE

## 2023-09-10 PROCEDURE — 25010000002 CEFTRIAXONE PER 250 MG: Performed by: PHYSICIAN ASSISTANT

## 2023-09-10 PROCEDURE — 83735 ASSAY OF MAGNESIUM: CPT | Performed by: FAMILY MEDICINE

## 2023-09-10 PROCEDURE — 85007 BL SMEAR W/DIFF WBC COUNT: CPT | Performed by: FAMILY MEDICINE

## 2023-09-10 PROCEDURE — 82550 ASSAY OF CK (CPK): CPT

## 2023-09-10 PROCEDURE — 85025 COMPLETE CBC W/AUTO DIFF WBC: CPT | Performed by: FAMILY MEDICINE

## 2023-09-10 PROCEDURE — 82948 REAGENT STRIP/BLOOD GLUCOSE: CPT

## 2023-09-10 PROCEDURE — G0378 HOSPITAL OBSERVATION PER HR: HCPCS

## 2023-09-10 PROCEDURE — 63710000001 INSULIN DETEMIR PER 5 UNITS: Performed by: FAMILY MEDICINE

## 2023-09-10 PROCEDURE — 84132 ASSAY OF SERUM POTASSIUM: CPT | Performed by: NURSE PRACTITIONER

## 2023-09-10 PROCEDURE — 99232 SBSQ HOSP IP/OBS MODERATE 35: CPT | Performed by: FAMILY MEDICINE

## 2023-09-10 PROCEDURE — 63710000001 INSULIN LISPRO (HUMAN) PER 5 UNITS: Performed by: NURSE PRACTITIONER

## 2023-09-10 RX ORDER — POTASSIUM CHLORIDE 20 MEQ/1
40 TABLET, EXTENDED RELEASE ORAL EVERY 4 HOURS
Status: COMPLETED | OUTPATIENT
Start: 2023-09-10 | End: 2023-09-10

## 2023-09-10 RX ADMIN — RIVAROXABAN 15 MG: 15 TABLET, FILM COATED ORAL at 17:02

## 2023-09-10 RX ADMIN — IBUPROFEN 400 MG: 400 TABLET, FILM COATED ORAL at 17:02

## 2023-09-10 RX ADMIN — OXYCODONE HYDROCHLORIDE AND ACETAMINOPHEN 1 TABLET: 5; 325 TABLET ORAL at 21:16

## 2023-09-10 RX ADMIN — Medication 10 ML: at 05:04

## 2023-09-10 RX ADMIN — VANCOMYCIN HYDROCHLORIDE 125 MG: 125 CAPSULE ORAL at 21:16

## 2023-09-10 RX ADMIN — FAMOTIDINE 20 MG: 10 INJECTION INTRAVENOUS at 10:09

## 2023-09-10 RX ADMIN — HYDRALAZINE HYDROCHLORIDE 50 MG: 50 TABLET, FILM COATED ORAL at 13:17

## 2023-09-10 RX ADMIN — ACETAMINOPHEN 650 MG: 325 TABLET, FILM COATED ORAL at 13:22

## 2023-09-10 RX ADMIN — Medication 5 MG: at 21:16

## 2023-09-10 RX ADMIN — AMLODIPINE BESYLATE 10 MG: 10 TABLET ORAL at 10:06

## 2023-09-10 RX ADMIN — CARVEDILOL 12.5 MG: 12.5 TABLET, FILM COATED ORAL at 10:04

## 2023-09-10 RX ADMIN — INSULIN LISPRO 2 UNITS: 100 INJECTION, SOLUTION INTRAVENOUS; SUBCUTANEOUS at 21:16

## 2023-09-10 RX ADMIN — Medication 10 ML: at 21:17

## 2023-09-10 RX ADMIN — Medication 10 ML: at 10:07

## 2023-09-10 RX ADMIN — VANCOMYCIN HYDROCHLORIDE 125 MG: 125 CAPSULE ORAL at 10:06

## 2023-09-10 RX ADMIN — MIRTAZAPINE 7.5 MG: 15 TABLET, FILM COATED ORAL at 21:16

## 2023-09-10 RX ADMIN — INSULIN DETEMIR 10 UNITS: 100 INJECTION, SOLUTION SUBCUTANEOUS at 10:06

## 2023-09-10 RX ADMIN — IBUPROFEN 400 MG: 400 TABLET, FILM COATED ORAL at 11:44

## 2023-09-10 RX ADMIN — POTASSIUM CHLORIDE 40 MEQ: 1500 TABLET, EXTENDED RELEASE ORAL at 06:05

## 2023-09-10 RX ADMIN — FERROUS SULFATE TAB 325 MG (65 MG ELEMENTAL FE) 325 MG: 325 (65 FE) TAB at 10:04

## 2023-09-10 RX ADMIN — POTASSIUM CHLORIDE 40 MEQ: 1500 TABLET, EXTENDED RELEASE ORAL at 10:04

## 2023-09-10 RX ADMIN — HYDRALAZINE HYDROCHLORIDE 50 MG: 50 TABLET, FILM COATED ORAL at 06:05

## 2023-09-10 RX ADMIN — LIDOCAINE 1 PATCH: 560 PATCH PERCUTANEOUS; TOPICAL; TRANSDERMAL at 10:10

## 2023-09-10 RX ADMIN — HYDRALAZINE HYDROCHLORIDE 50 MG: 50 TABLET, FILM COATED ORAL at 00:06

## 2023-09-10 RX ADMIN — SODIUM CHLORIDE 2000 MG: 900 INJECTION INTRAVENOUS at 11:44

## 2023-09-10 RX ADMIN — CARVEDILOL 12.5 MG: 12.5 TABLET, FILM COATED ORAL at 17:02

## 2023-09-10 RX ADMIN — TERAZOSIN HYDROCHLORIDE 1 MG: 1 CAPSULE ORAL at 21:16

## 2023-09-10 RX ADMIN — MULTIVITAMIN TABLET 1 TABLET: TABLET at 10:06

## 2023-09-10 RX ADMIN — Medication 1 CAPSULE: at 10:05

## 2023-09-10 RX ADMIN — HYDRALAZINE HYDROCHLORIDE 50 MG: 50 TABLET, FILM COATED ORAL at 21:16

## 2023-09-10 NOTE — PROGRESS NOTES
[facility] Medicine Services  PROGRESS NOTE    Patient Name: Thelma Michael  : 1960  MRN: [id number]    Date of Admission: 2023  Primary Care Physician: Monet Howe APRN    Subjective   Subjective     CC:  N/v    HPI:  Patient is a 62-year-old female seen and examined by me again this a.m., she is continued to have intermittent fevers yesterday evening however improved thus far this AM.  She continues to have some loose stools as well as some continued abdominal pain.  Infectious disease consulted and following at this time. Continued supportive care and treatment for recent UTI. No other acute changes overnight per patient. Patient now off cardene gtt, continue oral medications for treatment of hypertension.         ROS:  Gen- Some fever and chills, continued fatigue.   CV- No chest pain, palpitations  Resp- No cough, dyspnea  GI- Reports had some recurrent diarrhea overnight, some nausea and continued abdominal pain, no vomiting.       Objective   Objective     Vital Signs:   Temp:  [97.6 °F (36.4 °C)-103.1 °F (39.5 °C)] 100.5 °F (38.1 °C)  Heart Rate:  [64-94] 90  Resp:  [16] 16  BP: (103-158)/(49-75) 152/70     Physical Exam:  Constitutional: No acute distress, awake, alert, frail and acutely ill appearing   HENT: NCAT, nares patent, mucous membranes moist  Respiratory: Clear to auscultation bilaterally, respiratory effort normal   Cardiovascular: RRR, no murmurs, rubs, or gallops  Gastrointestinal: Positive bowel sounds, soft, minor tenderness to palpation, nondistended  Musculoskeletal: No bilateral ankle edema, no clubbing or cyanosis  Psychiatric: Appropriate affect, cooperative  Neurologic: Oriented x 3, strength symmetric in all extremities, Cranial Nerves grossly intact to confrontation, speech clear  Skin: No rashes     Results Reviewed:  LAB RESULTS:      Lab 09/10/23  0343 23  1747 23  1626 23  0859 23  0610  09/05/23  2320   WBC 6.77  --  6.47 4.84  --  7.29   HEMOGLOBIN 10.4*  --  11.6* 11.5*  --  13.0   HEMATOCRIT 32.4*  --  36.3 34.6  --  39.1   PLATELETS 306  --  337 349  --  404   NEUTROS ABS 3.68  --  3.88 2.73  --  6.14   IMMATURE GRANS (ABS) 0.04  --  0.02 0.05  --  0.02   LYMPHS ABS 2.27  --  1.89 1.23  --  0.83   MONOS ABS 0.73  --  0.67 0.82  --  0.29   EOS ABS 0.02  --  0.00 0.00  --  0.00   MCV 90.8  --  90.1 88.5  --  86.9   PROCALCITONIN  --   --  0.15  --   --  0.17   LACTATE  --  1.0 0.7  --  1.0  --          Lab 09/10/23  0343 09/08/23  1626 09/08/23  0511 09/07/23  1612 09/07/23  0436 09/06/23  2054 09/06/23  0901 09/06/23  0859   SODIUM 141 145 141  --  143  --  146*  --    POTASSIUM 3.4* 3.9 3.9 3.6 3.4*   < > 3.3*  --    CHLORIDE 111* 110* 108*  --  111*  --  110*  --    CO2 22.0 20.0* 19.0*  --  21.0*  --  21.0*  --    ANION GAP 8.0 15.0 14.0  --  11.0  --  15.0  --    BUN 25* 17 17  --  20  --  27*  --    CREATININE 1.50* 1.30* 1.27*  --  1.26*  --  1.61*  --    EGFR 38.8* 46.0* 47.3*  --  47.8*  --  35.6*  --    GLUCOSE 76 127* 124*  --  58*  --  162*  --    CALCIUM 8.2* 8.6 8.5*  --  8.3*  --  8.4*  --    MAGNESIUM 2.1 1.8  --   --  2.0  --   --   --    PHOSPHORUS  --   --   --   --  2.6  --   --   --    HEMOGLOBIN A1C  --   --   --   --   --   --   --  11.10*    < > = values in this interval not displayed.         Lab 09/10/23  0343 09/08/23  1626 09/05/23  2320   TOTAL PROTEIN 5.7* 6.5 8.2   ALBUMIN 2.7* 3.2* 4.0   GLOBULIN 3.0 3.3 4.2   ALT (SGPT) 7 10 13   AST (SGOT) 11 14 36*   BILIRUBIN 0.3 0.4 0.5   ALK PHOS 62 77 111   LIPASE  --   --  26                     Lab 09/06/23  0426   PH, ARTERIAL 7.436   PCO2, ARTERIAL 32.2*   PO2 ART 83.3   FIO2 21   HCO3 ART 21.7   BASE EXCESS ART -1.9*   CARBOXYHEMOGLOBIN 0.9     Brief Urine Lab Results  (Last result in the past 365 days)        Color   Clarity   Blood   Leuk Est   Nitrite   Protein   CREAT   Urine HCG        09/05/23 2309 Yellow    Turbid   Large (3+)   Moderate (2+)   Negative   >=300 mg/dL (3+)                   Microbiology Results Abnormal       Procedure Component Value - Date/Time    Blood Culture - Blood, Hand, Left [[id number]]  (Normal) Collected: 09/07/23 0752    Lab Status: Preliminary result Specimen: Blood from Hand, Left Updated: 09/10/23 0815     Blood Culture No growth at 3 days    Narrative:      Aerobic bottle only      Blood Culture - Blood, Hand, Right [[id number]]  (Normal) Collected: 09/07/23 0750    Lab Status: Preliminary result Specimen: Blood from Hand, Right Updated: 09/10/23 0815     Blood Culture No growth at 3 days    Respiratory Panel PCR w/COVID-19(SARS-CoV-2) [facility]UAN ALBERTO/[facility]Y/[facility]AL[facility] In-House, NP Swab in UTM/VTM, 3-4 HR TAT - Swab, Nasopharynx [[id number]]  (Normal) Collected: 09/08/23 1714    Lab Status: Final result Specimen: Swab from Nasopharynx Updated: 09/08/23 1818     ADENOVIRUS, PCR Not Detected     Coronavirus 229E Not Detected     Coronavirus HKU1 Not Detected     Coronavirus NL63 Not Detected     Coronavirus OC43 Not Detected     COVID19 Not Detected     Human Metapneumovirus Not Detected     Human Rhinovirus/Enterovirus Not Detected     Influenza A PCR Not Detected     Influenza B PCR Not Detected     Parainfluenza Virus 1 Not Detected     Parainfluenza Virus 2 Not Detected     Parainfluenza Virus 3 Not Detected     Parainfluenza Virus 4 Not Detected     RSV, PCR Not Detected     Bordetella pertussis pcr Not Detected     Bordetella parapertussis PCR Not Detected     Chlamydophila pneumoniae PCR Not Detected     Mycoplasma pneumo by PCR Not Detected    Narrative:      In the setting of a positive respiratory panel with a viral infection PLUS a negative procalcitonin without other underlying concern for bacterial infection, consider observing off antibiotics or discontinuation of antibiotics and continue supportive care. If the respiratory panel is positive for atypical bacterial infection  (Bordetella pertussis, Chlamydophila pneumoniae, or Mycoplasma pneumoniae), consider antibiotic de-escalation to target atypical bacterial infection.    Respiratory Panel PCR w/COVID-19(SARS-CoV-2) [facility]UAN ALBERTO/[facility]Y/[facility] In-House, NP Swab in UTM/VTM, 3-4 HR TAT - Swab, Nasopharynx [[id number]]  (Normal) Collected: 09/06/23 0620    Lab Status: Final result Specimen: Swab from Nasopharynx Updated: 09/06/23 0790     ADENOVIRUS, PCR Not Detected     Coronavirus 229E Not Detected     Coronavirus HKU1 Not Detected     Coronavirus NL63 Not Detected     Coronavirus OC43 Not Detected     COVID19 Not Detected     Human Metapneumovirus Not Detected     Human Rhinovirus/Enterovirus Not Detected     Influenza A PCR Not Detected     Influenza B PCR Not Detected     Parainfluenza Virus 1 Not Detected     Parainfluenza Virus 2 Not Detected     Parainfluenza Virus 3 Not Detected     Parainfluenza Virus 4 Not Detected     RSV, PCR Not Detected     Bordetella pertussis pcr Not Detected     Bordetella parapertussis PCR Not Detected     Chlamydophila pneumoniae PCR Not Detected     Mycoplasma pneumo by PCR Not Detected    Narrative:      In the setting of a positive respiratory panel with a viral infection PLUS a negative procalcitonin without other underlying concern for bacterial infection, consider observing off antibiotics or discontinuation of antibiotics and continue supportive care. If the respiratory panel is positive for atypical bacterial infection (Bordetella pertussis, Chlamydophila pneumoniae, or Mycoplasma pneumoniae), consider antibiotic de-escalation to target atypical bacterial infection.            No radiology results from the last 24 hrs    Results for orders placed during the hospital encounter of 12/06/22    Adult Transthoracic Echo Complete W/ Cont if Necessary Per Protocol    Interpretation Summary    Left ventricular ejection fraction appears to be greater than 70%.    Left ventricular wall thickness is  consistent with moderate concentric hypertrophy.    Left ventricular diastolic function is consistent with (grade I) impaired relaxation.    Estimated right ventricular systolic pressure from tricuspid regurgitation is mildly elevated (35-45 mmHg). Calculated right ventricular systolic pressure from tricuspid regurgitation is 39 mmHg.      Current medications:  Scheduled Meds:amLODIPine, 10 mg, Oral, Q24H  carvedilol, 12.5 mg, Oral, BID With Meals  cefTRIAXone, 2,000 mg, Intravenous, Q24H  famotidine, 20 mg, Intravenous, Daily  ferrous sulfate, 325 mg, Oral, Daily With Breakfast  hydrALAZINE, 50 mg, Oral, Q8H  ibuprofen, 400 mg, Oral, Q6H  insulin detemir, 10 Units, Subcutaneous, Daily  insulin lispro, 2-7 Units, Subcutaneous, 4x Daily AC & at Bedtime  lactobacillus acidophilus, 1 capsule, Oral, Daily  Lidocaine, 1 patch, Transdermal, Q24H  mirtazapine, 7.5 mg, Oral, Nightly  multivitamin, 1 tablet, Oral, Daily  rivaroxaban, 15 mg, Oral, Daily With Dinner  sodium chloride, 10 mL, Intravenous, Q12H  terazosin, 1 mg, Oral, Nightly  vancomycin, 125 mg, Oral, BID      Continuous Infusions:niCARdipine, 5-15 mg/hr, Last Rate: Stopped (09/09/23 1902)      PRN Meds:.  acetaminophen    dextrose    dextrose    glucagon (human recombinant)    hydrALAZINE    melatonin    ondansetron **OR** ondansetron    oxyCODONE-acetaminophen    Potassium Replacement - Follow Nurse / BPA Driven Protocol    prochlorperazine **OR** prochlorperazine **OR** prochlorperazine    [COMPLETED] Insert Peripheral IV **AND** sodium chloride    sodium chloride    sodium chloride    Assessment & Plan   Assessment & Plan     Active Hospital Problems    Diagnosis  POA    **Hypertensive urgency [I16.0]  Yes    Acute UTI (urinary tract infection) [N39.0]  Yes    Nausea vomiting and diarrhea [R11.2, R19.7]  Yes    PAF (paroxysmal atrial fibrillation) [I48.0]  Yes    Anxiety associated with depression [F41.8]  Yes    Clavicle fracture [S42.009A]  Yes    CKD  (chronic kidney disease) stage 3, GFR 30-59 ml/min [N18.30]  Yes    Hypokalemia [E87.6]  Yes    Gastroparesis [K31.84]  Yes    Uncontrolled type 1 diabetes mellitus with hyperglycemia [E10.65]  Yes    Hyperlipidemia [E78.5]  Yes    Hypertension [I10]  Yes      Resolved Hospital Problems   No resolved problems to display.        Brief Hospital Course to date:  Thelma Michael is a 62 y.o. female w/ a hx of poorly controlled type 1 diabetes, Afib on Xarelto, iron deficiency anemia, gastroparesis s/p gastric stimulator, HTN, HLD, migraines, chronic urinary retention (previously performed self-catheterization), strokes (chronic right lentiform nucleus and left cerebellar lacunar infarcts found on MRI in February of 2023), tobacco abuse and GERD who presented to the emergency room with complaints of nausea, vomiting and diarrhea. Patient has had issues with fevers since the evening of 9/8, repeat blood cultures have been NGTD and no obvious new symptoms. Patient with negative procal and LA. Plans to consult ID for further evaluation. Patient has also had issues with recurrent hypertensive urgency, back on Cardene gtt this AM, plans to add PO hydralazine. Patient seen again on the morning of 9/10, has continued to have some issues with fever overnight, following with ID, back on IV rocephin. Patient's blood pressure overall improved and cardene again stopped, will discontinue and continue oral medications for hypertension. Repeat blood cultures from 9/7 negative to date, 9/9 pending at this time. Continue supportive care       **Positive blood cultures 2/2  -contaminant, stopped Vanc   --repeat BC obtained-  negative x 3 days   -- ID consulted and following, plans for again repeat blood cultures on 9/9, currently pending at this time.   -- Patient has been restarted on IV Rocephin for UTI, repeat UA pending at this time, continue to follow.           **Hypertensive urgency   -Cardene infusion weaned off, restarted  and now on again this AM on 9/9, now stopped again this AM, d/c and continue orals as below.   - Plans to start patient on PO hydralazine  -continue routine meds including Coreg, Norvasc  --started on Terazosin nightly   -holding routine Losartan  --Coreg previously increased.     **Hyperglycemia   **T1DM  -ABG w/ CO2 32; anion gap 22.0  -initial glucose 316  -s/p 10 units regular insulin   - Levemir 10 U daily started  -FSBG q ac/hs w/ sliding scale   -diabetic educator consult        **Nausea, vomiting, diarrhea   **Hx of gastroparesis   -CT abd/pel without acute findings other than bladder wall thickening c/w chronic cystitis   -Addendum: IVF have been stopped, continue to monitor BP   -clear liquid diet- advance as tolerated   --GI PCR + Enteroaggregative E.coli -- patient now with fever since the evening of 9/8, ID consult has been requested, continue to follow.   - Continued supportive care at this time     **Acute UTI   **Hx of neurogenic bladder (previously self-cath'd)  -UA w/ 4+ bacteria, 31-50 WBCs   -urine culture > 100k GNB  -previous urine culture + for enterococcus faecalis in 4/23 (previous results w/ mixed sameera)  - Rocephin restarted on 9/9 per ID, continue to follow.   -bladder scan q 4      **Hypokalemia   -replace per protocol      **CKD Stage III  -baseline CR 1.2-1.3  -current 1.2   -IVF now stopped   -monitor closely with N/V/D and minimal oral intake      **PAF   -continue coreg, Xarelto         Expected Discharge Location and Transportation: home   Expected Discharge 1-2 days pending BC results   Expected Discharge Date: 9/8/2023; Expected Discharge Time:      DVT prophylaxis:  Medical DVT prophylaxis orders are present.          CODE STATUS:   Code Status and Medical Interventions:   Ordered at: 09/06/23 0529     Code Status (Patient has no pulse and is not breathing):    CPR (Attempt to Resuscitate)     Medical Interventions (Patient has pulse or is breathing):    Full Support       M.  Skip Jimenez, DO  09/10/23

## 2023-09-10 NOTE — PROGRESS NOTES
INFECTIOUS DISEASE PROGRESS NOTE    Thelma Michael  1960  [id number]    Date of Consult: 9/9/2023    Admission Date: 9/5/2023      Requesting Provider: Skip Jimenez DO  Evaluating Physician: Pedro Tucker MD    Reason for Consultation: febrile, recent diarrhea/E. coli infection, ? Contaminated blood cultures    History of present illness:    Patient is a 62 y.o. female with h/o T1DM/poorly controlled, afib/Xarelto, anemia, gastroparesis/gastric stimulator, , GERD, TIAs, HLD, HTN, CKD Stage IIIa (baseline 0.9-1.25), migraines, CVA, malnutrition, former tobacco abuse/quit 1 year ago, chronic urinary retention/not self cathing for over a year, smokes marijuana occasionally for back pain control and appetite stimulator, and recent distal left clavicle fracture 9/1/23 who presented to [facility] ED on 9/5 for nausea, vomiting, and diarrhea for 2 days prior to arrival.  On arrival, she had 3 episodes of diarrhea in ED with decrease oral intake.  She denied fever, chills, shortness of breath, or dysuria.  She denies any hematemesis, hematochezia, or melena.  She had blood on toilet paper and has h/o hemorrhoids.  She has had fevers up to 102.5 last night.  Labs were WBC 7300 with 84% neutrophils, PCT 0.17, creatinine 1.22, A1C 11.2, and UA WBC 31-50 with moderate LE and negative nitrite.  Urine culture is positive for E.coli (pan sens).  Blood cultures are positive in 2 of 2 sets (2 of 4 bottles) with CoNS.  A GI panel PCR was positive for Enteroaggregative E.coli.  A respiratory panel PCR is negative. A CXR on 9/5 showed no acute findings. A CT scan of a/p without contrast on 9/6 showed no acute findings.  She was on oral Vancomycin from 9/5 to 9/6.  She was given a dose of Rocephin on 9/6 and a diose of IV Vancomycin on 9/7.  She is currently not on any antibiotic therapy.  ID was asked to evaluate.      Of note: she has not had a Cdiff done on this visit, but she had a Cdiff test on 8/11/23 with PCR  positive and toxin EIA negative.  She states they did do a test on this visit and it was negative. She also has had positive EAEC multiple times on 3/22/22, 3/26/22,12/10/22, 8/11/23, and now 9/7/23.  It appears that she has chronic issues with gastroparesis flares and diarrhea.  Her EAEC has never been treated because of her prolonged QT with Cipro and Azithromycin contraindicated.     9/10/23: Tmax 103.1. Fevers improved this morning. She states she had a watery stool early this morning.  Denies shortness of breath, vomiting, rashes.  Still with abdominal tenderness and nausea.     Past Medical History:   Diagnosis Date    Acid reflux     Acute bronchitis     Cardiac murmur     Depression with anxiety 10/5/2020    Diabetes mellitus     Gastroesophageal reflux disease 5/13/2016    H/O echocardiogram 08/07/2012    i. LVEF 65%.ii. Mild LVH.iii. Borderline evidence of atrial septal aneurysm.  No PFO.     History of nuclear stress test 08/22/2014    Negative for ischemia and scars; LVEF 77%.      History of TIAs 7/15/2021    Hyperlipidemia     Hypertension     Impacted cerumen of both ears     Migraine     Migraines 10/31/2019    Self-catheterizes urinary bladder     Sinusitis     Stroke     Tobacco abuse     quit 4 days ago.      Urticaria     Vitamin D deficiency 5/13/2016       Past Surgical History:   Procedure Laterality Date    CAPSULE ENDOSCOPY  07/27/2021    Procedure: PILLCAM DEPLOYMENT;  Surgeon: Mikael Worthy MD;  Location:  [facility];  Service: Gastroenterology;;    COLONOSCOPY      COLONOSCOPY N/A 07/27/2021    Procedure: COLONOSCOPY;  Surgeon: Mikael Worthy MD;  Location:  [facility];  Service: Gastroenterology;  Laterality: N/A;    DENTAL PROCEDURE      ENDOSCOPY N/A 06/30/2021    Procedure: ESOPHAGOGASTRODUODENOSCOPY;  Surgeon: Brunner, Mark I, MD;  Location:  [facility];  Service: Gastroenterology;  Laterality: N/A;    ENDOSCOPY N/A 07/27/2021    Procedure:  ESOPHAGOGASTRODUODENOSCOPY;  Surgeon: Mikael Worthy MD;  Location:  [facility];  Service: Gastroenterology;  Laterality: N/A;    ENDOSCOPY WITH JTUBE N/A 2022    Procedure: ESOPHAGOGASTRODUODENOSCOPY WITH JEJUNAL TUBE INSERTION;  Surgeon: Thom Glover MD;  Location:  [facility];  Service: Gastroenterology;  Laterality: N/A;    UPPER GASTROINTESTINAL ENDOSCOPY         Family History   Problem Relation Age of Onset    Anxiety disorder Other     Arthritis Other     ADD / ADHD Other     Heart disease Other         cardiac disorder    Depression Other     Diabetes Other     Hyperlipidemia Other     Hypertension Other     Lung cancer Other     Osteoporosis Other     Hypertension Brother     Diabetes Brother     Hyperlipidemia Mother     Hypertension Mother     Colon cancer Neg Hx        Social History     Socioeconomic History    Marital status:    Tobacco Use    Smoking status: Former     Packs/day: 0.25     Years: 30.00     Pack years: 7.50     Types: Cigarettes     Quit date: 2019     Years since quittin.2    Smokeless tobacco: Never    Tobacco comments:     BC PL never smoker    Vaping Use    Vaping Use: Never used   Substance and Sexual Activity    Alcohol use: Yes     Alcohol/week: 1.0 standard drink     Types: 1 Glasses of wine per week     Comment: ocassional    Drug use: Yes     Frequency: 2.0 times per week     Types: Marijuana    Sexual activity: Not Currently     Comment: Single        No Known Allergies      Medication:    Current Facility-Administered Medications:     acetaminophen (TYLENOL) tablet 650 mg, 650 mg, Oral, Q6H PRN, HARI Jimenez, DO, 650 mg at 09/10/23 1322    amLODIPine (NORVASC) tablet 10 mg, 10 mg, Oral, Q24H, Magali Delvalle, APRN, 10 mg at 09/10/23 1006    carvedilol (COREG) tablet 12.5 mg, 12.5 mg, Oral, BID With Meals, Susan Cohen, APRN, 12.5 mg at 09/10/23 1702    cefTRIAXone (ROCEPHIN) 2000 mg/100 mL 0.9% NS IVPB (MBP), 2,000 mg,  Intravenous, Q24H, Rene García PA, Last Rate: 0 mL/hr at 09/09/23 1524, 2,000 mg at 09/10/23 1144    dextrose (D50W) (25 g/50 mL) IV injection 25 g, 25 g, Intravenous, Q15 Min PRN, Magali Delvalle APRN    dextrose (GLUTOSE) oral gel 15 g, 15 g, Oral, Q15 Min PRN, Magali Delvalle APRN    famotidine (PEPCID) injection 20 mg, 20 mg, Intravenous, Daily, Faith Nelson MD, 20 mg at 09/10/23 1009    ferrous sulfate tablet 325 mg, 325 mg, Oral, Daily With Breakfast, Magali Delvalle APRN, 325 mg at 09/10/23 1004    glucagon (GLUCAGEN) injection 1 mg, 1 mg, Intramuscular, Q15 Min PRN, Magali Delvalle APRN    hydrALAZINE (APRESOLINE) injection 10 mg, 10 mg, Intravenous, Q6H PRN, HARI Jimenez DO, 10 mg at 09/08/23 1023    hydrALAZINE (APRESOLINE) tablet 50 mg, 50 mg, Oral, Q8H, HARI Jimenez DO, 50 mg at 09/10/23 1317    ibuprofen (ADVIL,MOTRIN) tablet 400 mg, 400 mg, Oral, Q6H, HARI Jimenez DO, 400 mg at 09/10/23 1702    insulin detemir (LEVEMIR) injection 10 Units, 10 Units, Subcutaneous, Daily, HARI Jimenez DO, 10 Units at 09/10/23 1006    Insulin Lispro (humaLOG) injection 2-7 Units, 2-7 Units, Subcutaneous, 4x Daily AC & at Bedtime, Magali Delvalle APRN, 5 Units at 09/09/23 1702    lactobacillus acidophilus (RISAQUAD) capsule 1 capsule, 1 capsule, Oral, Daily, Magali Delvalle APRN, 1 capsule at 09/10/23 1005    Lidocaine 4 % 1 patch, 1 patch, Transdermal, Q24H, Faith Nelson MD, 1 patch at 09/10/23 1010    melatonin tablet 5 mg, 5 mg, Oral, Nightly PRN, Magali Delvalle APRN, 5 mg at 09/08/23 2053    mirtazapine (REMERON) tablet 7.5 mg, 7.5 mg, Oral, Nightly, Martin Martinez III, DO, 7.5 mg at 09/09/23 2021    multivitamin (THERAGRAN) tablet 1 tablet, 1 tablet, Oral, Daily, Magali Delvalle APRN, 1 tablet at 09/10/23 1006    ondansetron (ZOFRAN) tablet 4 mg, 4 mg, Oral, Q6H PRN, 4 mg at 09/06/23 2105 **OR** ondansetron (ZOFRAN) injection 4 mg, 4 mg, Intravenous, Q6H PRN, Magali Delvalle APRN, 4 mg  at 09/07/23 2227    oxyCODONE-acetaminophen (PERCOCET) 5-325 MG per tablet 1 tablet, 1 tablet, Oral, Q6H PRN, Faith Nelson MD, 1 tablet at 09/09/23 1826    Potassium Replacement - Follow Nurse / BPA Driven Protocol, , Does not apply, PRN, Susan Cohen APRN    prochlorperazine (COMPAZINE) injection 5 mg, 5 mg, Intravenous, Q6H PRN, 5 mg at 09/07/23 1120 **OR** prochlorperazine (COMPAZINE) tablet 5 mg, 5 mg, Oral, Q6H PRN, 5 mg at 09/07/23 2057 **OR** prochlorperazine (COMPAZINE) suppository 25 mg, 25 mg, Rectal, Q12H PRN, HARI Jimenez,     rivaroxaban (XARELTO) tablet 15 mg, 15 mg, Oral, Daily With Dinner, Magali Delvalle APRN, 15 mg at 09/10/23 1702    [COMPLETED] Insert Peripheral IV, , , Once **AND** sodium chloride 0.9 % flush 10 mL, 10 mL, Intravenous, PRN, Nas Patel MD    sodium chloride 0.9 % flush 10 mL, 10 mL, Intravenous, Q12H, Magali Delvalle APRN, 10 mL at 09/10/23 1007    sodium chloride 0.9 % flush 10 mL, 10 mL, Intravenous, PRN, Magali Delvalle APRN    sodium chloride 0.9 % infusion 40 mL, 40 mL, Intravenous, PRN, Magali Delvalle APRN    terazosin (HYTRIN) capsule 1 mg, 1 mg, Oral, Nightly, Magali Delvalle APRN, 1 mg at 09/09/23 2021    vancomycin (VANCOCIN) capsule 125 mg, 125 mg, Oral, BID, Rene García PA, 125 mg at 09/10/23 1006    Antibiotics:  Anti-Infectives (From admission, onward)      Ordered     Dose/Rate Route Frequency Start Stop    09/09/23 1856  DAPTOmycin (CUBICIN) 350 mg in sodium chloride 0.9 % 50 mL IVPB        Ordering Provider: Pedro Tucker MD    6 mg/kg × 62.3 kg (Adjusted)  100 mL/hr over 30 Minutes Intravenous Once 09/09/23 1945 09/09/23 2051 09/09/23 1119  vancomycin (VANCOCIN) capsule 125 mg        Ordering Provider: Rene García PA    125 mg Oral 2 Times Daily 09/09/23 1300 09/19/23 0859    09/09/23 1119  cefTRIAXone (ROCEPHIN) 2000 mg/100 mL 0.9% NS IVPB (MBP)        Ordering Provider: Rene García PA    2,000 mg  over 30 Minutes  Intravenous Every 24 Hours 23 1200 23 1159    23 0331  vancomycin 1250 mg/250 mL 0.9% NS IVPB ([facility])        Ordering Provider: Vanessa Francisco APRN    20 mg/kg × 59.7 kg  over 75 Minutes Intravenous Once 23 0430 23 0652    23 1233  fosfomycin (MONUROL) packet 3 g        Ordering Provider: Faith Nelson MD    3 g Oral Once 23 1330 23 1332              Review of Systems:  See above.       Physical Exam:   Vital Signs  Temp (24hrs), Av.1 °F (37.3 °C), Min:97.6 °F (36.4 °C), Max:100.5 °F (38.1 °C)    Temp  Min: 97.6 °F (36.4 °C)  Max: 100.5 °F (38.1 °C)  BP  Min: 103/49  Max: 158/75  Pulse  Min: 64  Max: 93  Resp  Min: 16  Max: 16  SpO2  Min: 96 %  Max: 100 %    GENERAL: Awake and alert, in no acute distress.   HEENT: Normocephalic, atraumatic.  No conjunctival injection. No icterus. Oropharynx clear without evidence of thrush or exudate.   NECK: Supple without nuchal rigidity. No mass.  LYMPH: No cervical, axillary or inguinal lymphadenopathy.  HEART: RRR; No murmur, rubs, gallops.   LUNGS: Clear to auscultation bilaterally without wheezing, rales, rhonchi. Normal respiratory effort. Nonlabored.   ABDOMEN: Soft, mild tenderness, nondistended. Positive bowel sounds. No rebound or guarding.  EXT:  No cyanosis, clubbing or edema. No cord.  :  Without Ansari catheter.  MSK: No joint effusions or erythema  SKIN: Warm and dry without cutaneous eruptions on Inspection/palpation.    NEURO: Oriented to PPT.  Motor 5/5 strength  PSYCHIATRIC: Normal insight and judgment. Cooperative with PE    Laboratory Data    Results from last 7 days   Lab Units 09/10/23  0343 23  1626 23  0859   WBC 10*3/mm3 6.77 6.47 4.84   HEMOGLOBIN g/dL 10.4* 11.6* 11.5*   HEMATOCRIT % 32.4* 36.3 34.6   PLATELETS 10*3/mm3 306 337 349     Results from last 7 days   Lab Units 09/10/23  1545 09/10/23  0343   SODIUM mmol/L  --  141   POTASSIUM mmol/L 5.2 3.4*   CHLORIDE mmol/L  --  111*   CO2  mmol/L  --  22.0   BUN mg/dL  --  25*   CREATININE mg/dL  --  1.50*   GLUCOSE mg/dL  --  76   CALCIUM mg/dL  --  8.2*     Results from last 7 days   Lab Units 09/10/23  0343   ALK PHOS U/L 62   BILIRUBIN mg/dL 0.3   ALT (SGPT) U/L 7   AST (SGOT) U/L 11             Results from last 7 days   Lab Units 09/09/23  1747   LACTATE mmol/L 1.0     Results from last 7 days   Lab Units 09/10/23  0343   CK TOTAL U/L 45     Results from last 7 days   Lab Units 09/08/23  0511   VANCOMYCIN RM mcg/mL 8.30     Estimated Creatinine Clearance: 35.7 mL/min (A) (by C-G formula based on SCr of 1.5 mg/dL (H)).      Microbiology:  Microbiology Results (last 10 days)       Procedure Component Value - Date/Time    Blood Culture - Blood, Hand, Right [[id number]]  (Normal) Collected: 09/09/23 1751    Lab Status: Preliminary result Specimen: Blood from Hand, Right Updated: 09/10/23 1845     Blood Culture No growth at 24 hours    Blood Culture - Blood, Hand, Left [[id number]]  (Normal) Collected: 09/09/23 1747    Lab Status: Preliminary result Specimen: Blood from Hand, Left Updated: 09/10/23 1845     Blood Culture No growth at 24 hours    Respiratory Panel PCR w/COVID-19(SARS-CoV-2) [facility]-House, NP Swab in UTM/VTM, 3-4 HR TAT - Swab, Nasopharynx [[id number]]  (Normal) Collected: 09/08/23 1714    Lab Status: Final result Specimen: Swab from Nasopharynx Updated: 09/08/23 1818     ADENOVIRUS, PCR Not Detected     Coronavirus 229E Not Detected     Coronavirus HKU1 Not Detected     Coronavirus NL63 Not Detected     Coronavirus OC43 Not Detected     COVID19 Not Detected     Human Metapneumovirus Not Detected     Human Rhinovirus/Enterovirus Not Detected     Influenza A PCR Not Detected     Influenza B PCR Not Detected     Parainfluenza Virus 1 Not Detected     Parainfluenza Virus 2 Not Detected     Parainfluenza Virus 3 Not Detected     Parainfluenza Virus 4 Not Detected     RSV, PCR Not Detected     Bordetella pertussis pcr  Not Detected     Bordetella parapertussis PCR Not Detected     Chlamydophila pneumoniae PCR Not Detected     Mycoplasma pneumo by PCR Not Detected    Narrative:      In the setting of a positive respiratory panel with a viral infection PLUS a negative procalcitonin without other underlying concern for bacterial infection, consider observing off antibiotics or discontinuation of antibiotics and continue supportive care. If the respiratory panel is positive for atypical bacterial infection (Bordetella pertussis, Chlamydophila pneumoniae, or Mycoplasma pneumoniae), consider antibiotic de-escalation to target atypical bacterial infection.    Gastrointestinal Panel, PCR - Stool, Per Rectum [[id number]]  (Abnormal) Collected: 09/07/23 1018    Lab Status: Final result Specimen: Stool from Per Rectum Updated: 09/07/23 1234     Campylobacter Not Detected     Plesiomonas shigelloides Not Detected     Salmonella Not Detected     Vibrio Not Detected     Vibrio cholerae Not Detected     Yersinia enterocolitica Not Detected     Enteroaggregative E. coli (EAEC) Detected     Enteropathogenic E. coli (EPEC) Not Detected     Enterotoxigenic E. coli (ETEC) lt/st Not Detected     Shiga-like toxin-producing E. coli (STEC) stx1/stx2 Not Detected     Shigella/Enteroinvasive E. coli (EIEC) Not Detected     Cryptosporidium Not Detected     Cyclospora cayetanensis Not Detected     Entamoeba histolytica Not Detected     Giardia lamblia Not Detected     Adenovirus F40/41 Not Detected     Astrovirus Not Detected     Norovirus GI/GII Not Detected     Rotavirus A Not Detected     Sapovirus (I, II, IV or V) Not Detected    Blood Culture - Blood, Hand, Left [[id number]]  (Normal) Collected: 09/07/23 0752    Lab Status: Preliminary result Specimen: Blood from Hand, Left Updated: 09/10/23 0815     Blood Culture No growth at 3 days    Narrative:      Aerobic bottle only      Blood Culture - Blood, Hand, Right [[id number]]  (Normal) Collected: 09/07/23  0750    Lab Status: Preliminary result Specimen: Blood from Hand, Right Updated: 09/10/23 0815     Blood Culture No growth at 3 days    Respiratory Panel PCR w/COVID-19(SARS-CoV-2) [facility]-House, NP Swab in UTM/VTM, 3-4 HR TAT - Swab, Nasopharynx [[id number]]  (Normal) Collected: 09/06/23 0620    Lab Status: Final result Specimen: Swab from Nasopharynx Updated: 09/06/23 0744     ADENOVIRUS, PCR Not Detected     Coronavirus 229E Not Detected     Coronavirus HKU1 Not Detected     Coronavirus NL63 Not Detected     Coronavirus OC43 Not Detected     COVID19 Not Detected     Human Metapneumovirus Not Detected     Human Rhinovirus/Enterovirus Not Detected     Influenza A PCR Not Detected     Influenza B PCR Not Detected     Parainfluenza Virus 1 Not Detected     Parainfluenza Virus 2 Not Detected     Parainfluenza Virus 3 Not Detected     Parainfluenza Virus 4 Not Detected     RSV, PCR Not Detected     Bordetella pertussis pcr Not Detected     Bordetella parapertussis PCR Not Detected     Chlamydophila pneumoniae PCR Not Detected     Mycoplasma pneumo by PCR Not Detected    Narrative:      In the setting of a positive respiratory panel with a viral infection PLUS a negative procalcitonin without other underlying concern for bacterial infection, consider observing off antibiotics or discontinuation of antibiotics and continue supportive care. If the respiratory panel is positive for atypical bacterial infection (Bordetella pertussis, Chlamydophila pneumoniae, or Mycoplasma pneumoniae), consider antibiotic de-escalation to target atypical bacterial infection.    Blood Culture - Blood, Arm, Left [[id number]]  (Abnormal) Collected: 09/06/23 0600    Lab Status: Final result Specimen: Blood from Arm, Left Updated: 09/10/23 0606     Blood Culture Staphylococcus warneri     Isolated from Aerobic Bottle     Gram Stain Aerobic Bottle Gram positive cocci in groups    Narrative:      Probable contaminant requires  clinical correlation, susceptibility not performed unless requested by physician.        Blood Culture ID, PCR - Blood, Arm, Left [[id number]]  (Abnormal) Collected: 09/06/23 0600    Lab Status: Final result Specimen: Blood from Arm, Left Updated: 09/07/23 0133     BCID, PCR Staph spp, not aureus or lugdunensis. Identification by BCID2 PCR.     BOTTLE TYPE Aerobic Bottle    Blood Culture - Blood, Arm, Left [[id number]]  (Abnormal) Collected: 09/06/23 0550    Lab Status: Final result Specimen: Blood from Arm, Left Updated: 09/10/23 0606     Blood Culture Staphylococcus hominis ssp hominis     Isolated from Aerobic Bottle     Gram Stain Aerobic Bottle Gram positive cocci in groups    Narrative:      Probable contaminant requires clinical correlation, susceptibility not performed unless requested by physician.        Blood Culture ID, PCR - Blood, Arm, Left [[id number]]  (Abnormal) Collected: 09/06/23 0550    Lab Status: Final result Specimen: Blood from Arm, Left Updated: 09/07/23 0324     BCID, PCR Staph spp, not aureus or lugdunensis. Identification by BCID2 PCR.     BOTTLE TYPE Aerobic Bottle    Urine Culture - Urine, Urine, Clean Catch [[id number]]  (Abnormal)  (Susceptibility) Collected: 09/05/23 2309    Lab Status: Final result Specimen: Urine, Clean Catch Updated: 09/08/23 0112     Urine Culture >100,000 CFU/mL Escherichia coli    Narrative:      Colonization of the urinary tract without infection is common. Treatment is discouraged unless the patient is symptomatic, pregnant, or undergoing an invasive urologic procedure.    Susceptibility        Escherichia coli      AMELIA      Ampicillin Susceptible      Ampicillin + Sulbactam Susceptible      Cefazolin Susceptible      Cefepime Susceptible      Ceftazidime Susceptible      Ceftriaxone Susceptible      Gentamicin Susceptible      Levofloxacin Susceptible      Nitrofurantoin Susceptible      Piperacillin + Tazobactam Susceptible      Trimethoprim + Sulfamethoxazole  Susceptible                                         Radiology:    9/6/23 chest x-ray:  Findings:  There are no airspace consolidations. No pleural fluid. No pneumothorax. The pulmonary vasculature appears within normal limits. The cardiac and mediastinal silhouette appear unremarkable. No acute osseous abnormality identified.   Impression:     Impression:  No acute cardiopulmonary process.       9/6/23 CT abdomen and pelvis without contrast:  Impression:     1. No acute intra-abdominal or intrapelvic process. Circumferential bladder wall thickening is present likely related to chronic cystitis or outlet obstruction, similar as compared to previous studies.   2. Ancillary findings as described above..       Impression:   SIRS criteria with hectic fevers and tachycardia- Unclear etiology but I suspect this is due to a urinary source.  She did recently have positive blood cultures but repeat blood cultures were no growth.  Unclear if her coagulase-negative staph was a contaminant versus true bacteremia. No signs of pneumonia clinically or on imaging. She did have recent C. Difficile colonization but not currently having any diarrhea. No acute intra-abdominal process on CT abdomen and pelvis recently. Note that her CT abdomen and pelvis was without contrast. She did have bladder wall thickening consistent with cystitis.  E.coli UTI.  Patient has not self cathed in over a year as she urinates on her own now.   Coagulase negative Staph bacteremia vs contamination.  Repeat blood cultures negative to date. Blood cultures from 9/6 were collected from the same arm but appeared to have been collected at slightly different times--one set with Staph warneri and other set with Staph hominis. This represents a contaminated blood culture  Chronic recurrent Enteroaggregative E.coli positive diarrhea.  Usually self limiting, but likely colonizer.  Acute renal failure/CKD Stage IIIa, improved  H/o Cdiff 3/2022, also on 8/11, PCR was  positive but toxin negative indicating colonization.  Type 1 diabetes mellitus, uncontrolled  Chronic Atrial fibrillation/Xarelto  Iron-deficiency anemia  Gastroparesis/gastric stimulator with acute flares.  H/o cerebrovascular accident/TIAs  Ongoing tobacco abuse, just quit about a week ago.  Recurrent prolonged QT, avoid antimicrobials that may prolong QT such as Levaquin, Cipro, azithromycin, fluconazole.      PLAN/RECOMMENDATIONS:   Thank you for asking us to see Thelma Levineyayo Teranon, I recommend the following:  - Continue to monitor CBC closely  - Vancomycin 125 mg PO BID for Cdiff prophylaxis  - Rocephin 2 GM IV daily for now.   - Status post a one-time dose of IV daptomycin given her coagulase-negative staph in blood cultures recently and her new fevers. I now suspect this is more likely a contaminant Given 2 different types of coag-negative staph on culture  - Blood cultures x 2 since she is having new fevers since her last set of blood cultures--pending.     Patient continues to have hectic fevers and occasional diarrhea.  She appears very frail and ill.     Pedro Tucker MD saw and examined patient, verified hx and PE, read all radiographic studies, reviewed labs and micro data, and formulated dx, plan for treatment and all medical decision making.      Rene García PA-C for MD Pedro Cobian MD  9/10/2023  20:52 EDT

## 2023-09-10 NOTE — PLAN OF CARE
Goal Outcome Evaluation:  Plan of Care Reviewed With: patient           Outcome Evaluation: Pt a/o, RA, sat. 94%, no c/o pain at this time. Fever noted, see MD JORGE notified. Continue monitoring.

## 2023-09-11 ENCOUNTER — APPOINTMENT (OUTPATIENT)
Dept: CT IMAGING | Facility: HOSPITAL | Age: 63
DRG: 304 | End: 2023-09-11
Payer: COMMERCIAL

## 2023-09-11 LAB
ALBUMIN SERPL-MCNC: 3.2 G/DL (ref 3.5–5.2)
ALBUMIN/GLOB SERPL: 0.9 G/DL
ALP SERPL-CCNC: 79 U/L (ref 39–117)
ALT SERPL W P-5'-P-CCNC: 7 U/L (ref 1–33)
ANION GAP SERPL CALCULATED.3IONS-SCNC: 9 MMOL/L (ref 5–15)
AST SERPL-CCNC: 13 U/L (ref 1–32)
BACTERIA UR QL AUTO: ABNORMAL /HPF
BASOPHILS # BLD AUTO: 0.03 10*3/MM3 (ref 0–0.2)
BASOPHILS NFR BLD AUTO: 0.2 % (ref 0–1.5)
BILIRUB SERPL-MCNC: 0.3 MG/DL (ref 0–1.2)
BILIRUB UR QL STRIP: NEGATIVE
BUN SERPL-MCNC: 15 MG/DL (ref 8–23)
BUN/CREAT SERPL: 12.6 (ref 7–25)
CALCIUM SPEC-SCNC: 8.8 MG/DL (ref 8.6–10.5)
CHLORIDE SERPL-SCNC: 112 MMOL/L (ref 98–107)
CLARITY UR: ABNORMAL
CO2 SERPL-SCNC: 22 MMOL/L (ref 22–29)
COLOR UR: YELLOW
CREAT SERPL-MCNC: 1.19 MG/DL (ref 0.57–1)
D-LACTATE SERPL-SCNC: 1.5 MMOL/L (ref 0.5–2)
DEPRECATED RDW RBC AUTO: 45.4 FL (ref 37–54)
EGFRCR SERPLBLD CKD-EPI 2021: 51.2 ML/MIN/1.73
EOSINOPHIL # BLD AUTO: 0.02 10*3/MM3 (ref 0–0.4)
EOSINOPHIL NFR BLD AUTO: 0.1 % (ref 0.3–6.2)
ERYTHROCYTE [DISTWIDTH] IN BLOOD BY AUTOMATED COUNT: 13.9 % (ref 12.3–15.4)
GLOBULIN UR ELPH-MCNC: 3.4 GM/DL
GLUCOSE BLDC GLUCOMTR-MCNC: 117 MG/DL (ref 70–130)
GLUCOSE BLDC GLUCOMTR-MCNC: 132 MG/DL (ref 70–130)
GLUCOSE BLDC GLUCOMTR-MCNC: 178 MG/DL (ref 70–130)
GLUCOSE BLDC GLUCOMTR-MCNC: 214 MG/DL (ref 70–130)
GLUCOSE SERPL-MCNC: 93 MG/DL (ref 65–99)
GLUCOSE UR STRIP-MCNC: ABNORMAL MG/DL
HCT VFR BLD AUTO: 35.1 % (ref 34–46.6)
HGB BLD-MCNC: 11.6 G/DL (ref 12–15.9)
HGB UR QL STRIP.AUTO: ABNORMAL
HYALINE CASTS UR QL AUTO: ABNORMAL /LPF
IMM GRANULOCYTES # BLD AUTO: 0.06 10*3/MM3 (ref 0–0.05)
IMM GRANULOCYTES NFR BLD AUTO: 0.4 % (ref 0–0.5)
KETONES UR QL STRIP: ABNORMAL
LEUKOCYTE ESTERASE UR QL STRIP.AUTO: ABNORMAL
LYMPHOCYTES # BLD AUTO: 1.4 10*3/MM3 (ref 0.7–3.1)
LYMPHOCYTES NFR BLD AUTO: 10 % (ref 19.6–45.3)
MAGNESIUM SERPL-MCNC: 1.9 MG/DL (ref 1.6–2.4)
MCH RBC QN AUTO: 29.3 PG (ref 26.6–33)
MCHC RBC AUTO-ENTMCNC: 33 G/DL (ref 31.5–35.7)
MCV RBC AUTO: 88.6 FL (ref 79–97)
MONOCYTES # BLD AUTO: 0.96 10*3/MM3 (ref 0.1–0.9)
MONOCYTES NFR BLD AUTO: 6.9 % (ref 5–12)
NEUTROPHILS NFR BLD AUTO: 11.51 10*3/MM3 (ref 1.7–7)
NEUTROPHILS NFR BLD AUTO: 82.4 % (ref 42.7–76)
NITRITE UR QL STRIP: NEGATIVE
NRBC BLD AUTO-RTO: 0 /100 WBC (ref 0–0.2)
PH UR STRIP.AUTO: 5.5 [PH] (ref 5–8)
PLATELET # BLD AUTO: 417 10*3/MM3 (ref 140–450)
PMV BLD AUTO: 10.2 FL (ref 6–12)
POTASSIUM SERPL-SCNC: 4.1 MMOL/L (ref 3.5–5.2)
PROCALCITONIN SERPL-MCNC: 0.15 NG/ML (ref 0–0.25)
PROT SERPL-MCNC: 6.6 G/DL (ref 6–8.5)
PROT UR QL STRIP: ABNORMAL
RBC # BLD AUTO: 3.96 10*6/MM3 (ref 3.77–5.28)
RBC # UR STRIP: ABNORMAL /HPF
REF LAB TEST METHOD: ABNORMAL
SODIUM SERPL-SCNC: 143 MMOL/L (ref 136–145)
SP GR UR STRIP: 1.05 (ref 1–1.03)
SQUAMOUS #/AREA URNS HPF: ABNORMAL /HPF
UROBILINOGEN UR QL STRIP: ABNORMAL
WBC # UR STRIP: ABNORMAL /HPF
WBC NRBC COR # BLD: 13.98 10*3/MM3 (ref 3.4–10.8)

## 2023-09-11 PROCEDURE — 25510000001 IOPAMIDOL 61 % SOLUTION: Performed by: FAMILY MEDICINE

## 2023-09-11 PROCEDURE — 25010000002 CEFTRIAXONE PER 250 MG: Performed by: PHYSICIAN ASSISTANT

## 2023-09-11 PROCEDURE — 99232 SBSQ HOSP IP/OBS MODERATE 35: CPT | Performed by: FAMILY MEDICINE

## 2023-09-11 PROCEDURE — 83735 ASSAY OF MAGNESIUM: CPT | Performed by: FAMILY MEDICINE

## 2023-09-11 PROCEDURE — 25010000002 PIPERACILLIN SOD-TAZOBACTAM PER 1 G: Performed by: PHYSICIAN ASSISTANT

## 2023-09-11 PROCEDURE — 85025 COMPLETE CBC W/AUTO DIFF WBC: CPT | Performed by: FAMILY MEDICINE

## 2023-09-11 PROCEDURE — 82948 REAGENT STRIP/BLOOD GLUCOSE: CPT

## 2023-09-11 PROCEDURE — 84145 PROCALCITONIN (PCT): CPT | Performed by: FAMILY MEDICINE

## 2023-09-11 PROCEDURE — 80053 COMPREHEN METABOLIC PANEL: CPT | Performed by: FAMILY MEDICINE

## 2023-09-11 PROCEDURE — 63710000001 INSULIN DETEMIR PER 5 UNITS: Performed by: FAMILY MEDICINE

## 2023-09-11 PROCEDURE — 74177 CT ABD & PELVIS W/CONTRAST: CPT

## 2023-09-11 PROCEDURE — 83605 ASSAY OF LACTIC ACID: CPT | Performed by: FAMILY MEDICINE

## 2023-09-11 PROCEDURE — 81001 URINALYSIS AUTO W/SCOPE: CPT | Performed by: FAMILY MEDICINE

## 2023-09-11 PROCEDURE — 87086 URINE CULTURE/COLONY COUNT: CPT | Performed by: FAMILY MEDICINE

## 2023-09-11 PROCEDURE — 63710000001 INSULIN LISPRO (HUMAN) PER 5 UNITS: Performed by: NURSE PRACTITIONER

## 2023-09-11 RX ORDER — FAMOTIDINE 20 MG/1
20 TABLET, FILM COATED ORAL EVERY MORNING
Status: DISCONTINUED | OUTPATIENT
Start: 2023-09-11 | End: 2023-09-18 | Stop reason: HOSPADM

## 2023-09-11 RX ADMIN — INSULIN LISPRO 3 UNITS: 100 INJECTION, SOLUTION INTRAVENOUS; SUBCUTANEOUS at 12:04

## 2023-09-11 RX ADMIN — VANCOMYCIN HYDROCHLORIDE 125 MG: 125 CAPSULE ORAL at 09:06

## 2023-09-11 RX ADMIN — PIPERACILLIN SODIUM AND TAZOBACTAM SODIUM 3.38 G: 3; .375 INJECTION, SOLUTION INTRAVENOUS at 17:09

## 2023-09-11 RX ADMIN — TERAZOSIN HYDROCHLORIDE 1 MG: 1 CAPSULE ORAL at 21:44

## 2023-09-11 RX ADMIN — HYDRALAZINE HYDROCHLORIDE 50 MG: 50 TABLET, FILM COATED ORAL at 04:40

## 2023-09-11 RX ADMIN — INSULIN LISPRO 2 UNITS: 100 INJECTION, SOLUTION INTRAVENOUS; SUBCUTANEOUS at 21:44

## 2023-09-11 RX ADMIN — Medication 10 ML: at 09:07

## 2023-09-11 RX ADMIN — AMLODIPINE BESYLATE 10 MG: 10 TABLET ORAL at 09:05

## 2023-09-11 RX ADMIN — Medication 1 CAPSULE: at 09:05

## 2023-09-11 RX ADMIN — FERROUS SULFATE TAB 325 MG (65 MG ELEMENTAL FE) 325 MG: 325 (65 FE) TAB at 09:06

## 2023-09-11 RX ADMIN — ACETAMINOPHEN 650 MG: 325 TABLET, FILM COATED ORAL at 09:20

## 2023-09-11 RX ADMIN — IBUPROFEN 400 MG: 400 TABLET, FILM COATED ORAL at 11:33

## 2023-09-11 RX ADMIN — IBUPROFEN 400 MG: 400 TABLET, FILM COATED ORAL at 17:05

## 2023-09-11 RX ADMIN — CARVEDILOL 12.5 MG: 12.5 TABLET, FILM COATED ORAL at 09:05

## 2023-09-11 RX ADMIN — PIPERACILLIN SODIUM AND TAZOBACTAM SODIUM 3.38 G: 3; .375 INJECTION, SOLUTION INTRAVENOUS at 13:36

## 2023-09-11 RX ADMIN — CARVEDILOL 12.5 MG: 12.5 TABLET, FILM COATED ORAL at 17:05

## 2023-09-11 RX ADMIN — INSULIN DETEMIR 10 UNITS: 100 INJECTION, SOLUTION SUBCUTANEOUS at 09:05

## 2023-09-11 RX ADMIN — VANCOMYCIN HYDROCHLORIDE 125 MG: 125 CAPSULE ORAL at 21:44

## 2023-09-11 RX ADMIN — IOPAMIDOL 75 ML: 612 INJECTION, SOLUTION INTRAVENOUS at 11:15

## 2023-09-11 RX ADMIN — Medication 10 ML: at 21:45

## 2023-09-11 RX ADMIN — FAMOTIDINE 20 MG: 20 TABLET ORAL at 09:06

## 2023-09-11 RX ADMIN — IBUPROFEN 400 MG: 400 TABLET, FILM COATED ORAL at 05:08

## 2023-09-11 RX ADMIN — HYDRALAZINE HYDROCHLORIDE 50 MG: 50 TABLET, FILM COATED ORAL at 21:44

## 2023-09-11 RX ADMIN — MULTIVITAMIN TABLET 1 TABLET: TABLET at 09:06

## 2023-09-11 RX ADMIN — HYDRALAZINE HYDROCHLORIDE 50 MG: 50 TABLET, FILM COATED ORAL at 13:38

## 2023-09-11 RX ADMIN — MIRTAZAPINE 7.5 MG: 15 TABLET, FILM COATED ORAL at 21:44

## 2023-09-11 RX ADMIN — RIVAROXABAN 15 MG: 15 TABLET, FILM COATED ORAL at 17:05

## 2023-09-11 NOTE — PAYOR COMM NOTE
"Alisa Michael (62 y.o. Female)     Cassandra Magdaleno RN  Utilization Review  [id number]-[phone]  [phone]      OBS to INPT       Inpatient Admission  Once     Completed     Level of Care: Telemetry  Diagnosis: Hypertensive urgency [062860]  Admitting Physician: HARI CASSIDY [[id number]]  Attending Physician: HARI CASSIDY [[id number]]  Certification: I Certify That Inpatient Hospital Services Are Medically Necessary For Greater Than 2 Midnights    09/11/23 1209           Date of Birth   1960    Social Security Number       Address   [address] [place], [place] [address]    Home Phone   [phone]    MRN   [id number]       Buddhism   Sikh    Marital Status                               Admission Date   9/5/23    Admission Type   Emergency    Admitting Provider   HARI Cassidy DO    Attending Provider   HARI Cassidy DO    Department, Room/Bed   [facility] 6A, N606/1       Discharge Date       Discharge Disposition       Discharge Destination                                 Attending Provider: HARI Cassidy DO    Allergies: No Known Allergies    Isolation: Spore   Infection: C.difficile (08/11/23)   Code Status: CPR    Ht: 172.7 cm (68\")   Wt: 59.3 kg (130 lb 12.8 oz)    Admission Cmt: None   Principal Problem: Hypertensive urgency [I16.0]                   Active Insurance as of 9/5/2023       Primary Coverage       Payor Plan Insurance Group Employer/Plan Group    [employer] [place] [id number]       Payor Plan Address Payor Plan Phone Number Payor Plan Fax Number Effective Dates    [address] [address] [phone]  4/2/2020 - None Entered    [facility] [id number]         Subscriber Name Subscriber Birth Date Member ID       ALISA MICHAEL 1960 [id number]                     Emergency Contacts        (Rel.) Home Phone Work Phone Mobile Phone    VINAYAK PENN (Son) [phone] -- [phone]    EVELINE CANTRELL (Daughter) " [phone] -- [phone]    AMANDA MICHAEL (Daughter) [phone] -- [phone]    SLOAN CANTRELL (Friend) [phone] -- [phone]                 History & Physical        Martin Martinez III, DO at 23 0505              [facility] Medicine Services  HISTORY AND PHYSICAL    Patient Name: Thelma Michael  : 1960  MRN: [id number]  Primary Care Physician: Monet Howe, HOWIE  Date of admission: 2023    Subjective  Subjective     Chief Complaint:  Nausea, vomiting, diarrhea     HPI:  Thelma Michael is a 62 y.o. female w/ a hx of poorly controlled type 1 diabetes, Afib on Xarelto, iron deficiency anemia, gastroparesis s/p gastric stimulator, HTN, HLD, migraines, chronic urinary retention (previously performed self-catheterization), strokes (chronic right lentiform nucleus and left cerebellar lacunar infarcts found on MRI in 2023), tobacco abuse and GERD who presented to the emergency room with complaints of nausea, vomiting and diarrhea.  Patient evaluated in the emergency room on 2023.  Patient presented with complaints of a left shoulder injury.  X-rays consistent with a distal left clavicle fracture.  Patient was prescribed a short course of Norco for pain control.  Patient was also referred to Dr. Malagon with orthopedics for follow-up next week.  Patient was ultimately discharged home.  Patient presented to the emergency room [facility] with complaints of nausea, vomiting and diarrhea.  Patient reports that she developed nausea and vomiting 2 days ago and developed diarrhea on Tuesday morning.  Since being in the ER [facility] she has had approximately 3 episodes of diarrhea.  Patient has been unable to tolerate any oral intake for the past 2 days including her routine oral medications.  Patient denies fever, chills, chest pain, dyspnea, cough, melena, hematochezia, abdominal pain, dysuria, edema, syncope.  Patient evaluated in  the emergency room.  Urinalysis concerning for UTI. Initial glucose 316, anion gap 22, CO2 19. Pt admitted to the hospital medicine service for further evaluation.     Review of Systems   Constitutional: Negative.  Negative for chills, diaphoresis, fatigue and fever.   HENT: Negative.  Negative for congestion, postnasal drip, rhinorrhea, sinus pressure, sinus pain, sneezing, sore throat and trouble swallowing.    Eyes: Negative.  Negative for visual disturbance.   Respiratory: Negative.  Negative for cough, chest tightness, shortness of breath and wheezing.    Cardiovascular: Negative.  Negative for chest pain, palpitations and leg swelling.   Gastrointestinal:  Positive for diarrhea, nausea and vomiting. Negative for abdominal distention, blood in stool and constipation.        Abdominal cramping.    Endocrine: Negative.    Genitourinary:  Positive for decreased urine volume. Negative for dysuria.   Musculoskeletal: Negative.  Negative for arthralgias, back pain, myalgias, neck pain and neck stiffness.   Skin: Negative.  Negative for wound.   Allergic/Immunologic: Negative.  Negative for immunocompromised state.   Neurological: Negative.  Negative for dizziness, tremors, seizures, syncope, facial asymmetry, speech difficulty, weakness, light-headedness, numbness and headaches.   Hematological: Negative.  Does not bruise/bleed easily.   Psychiatric/Behavioral: Negative.  Negative for confusion.    All other systems reviewed and are negative.     Personal History     Past Medical History:   Diagnosis Date    Acid reflux     Acute bronchitis     Cardiac murmur     Depression with anxiety 10/5/2020    Diabetes mellitus     Gastroesophageal reflux disease 5/13/2016    H/O echocardiogram 08/07/2012    i. LVEF 65%.ii. Mild LVH.iii. Borderline evidence of atrial septal aneurysm.  No PFO.     History of nuclear stress test 08/22/2014    Negative for ischemia and scars; LVEF 77%.      History of TIAs 7/15/2021     Hyperlipidemia     Hypertension     Impacted cerumen of both ears     Migraine     Migraines 10/31/2019    Self-catheterizes urinary bladder     Sinusitis     Stroke     Tobacco abuse     quit 4 days ago.      Urticaria     Vitamin D deficiency 5/13/2016     Past Surgical History:   Procedure Laterality Date    CAPSULE ENDOSCOPY  07/27/2021    Procedure: PILLCAM DEPLOYMENT;  Surgeon: Mikael Worthy MD;  Location:  [facility];  Service: Gastroenterology;;    COLONOSCOPY      COLONOSCOPY N/A 07/27/2021    Procedure: COLONOSCOPY;  Surgeon: Mikael Worthy MD;  Location:  [facility];  Service: Gastroenterology;  Laterality: N/A;    DENTAL PROCEDURE      ENDOSCOPY N/A 06/30/2021    Procedure: ESOPHAGOGASTRODUODENOSCOPY;  Surgeon: Brunner, Mark I, MD;  Location:  [facility];  Service: Gastroenterology;  Laterality: N/A;    ENDOSCOPY N/A 07/27/2021    Procedure: ESOPHAGOGASTRODUODENOSCOPY;  Surgeon: Mikael Worthy MD;  Location:  [facility];  Service: Gastroenterology;  Laterality: N/A;    ENDOSCOPY WITH JTUBE N/A 03/16/2022    Procedure: ESOPHAGOGASTRODUODENOSCOPY WITH JEJUNAL TUBE INSERTION;  Surgeon: Thom Glover MD;  Location:  [facility];  Service: Gastroenterology;  Laterality: N/A;    UPPER GASTROINTESTINAL ENDOSCOPY       Family History:  family history includes ADD / ADHD in an other family member; Anxiety disorder in an other family member; Arthritis in an other family member; Depression in an other family member; Diabetes in her brother and another family member; Heart disease in an other family member; Hyperlipidemia in her mother and another family member; Hypertension in her brother, mother, and another family member; Lung cancer in an other family member; Osteoporosis in an other family member.     Social History:  reports that she quit smoking about 4 years ago. Her smoking use included cigarettes. She has a 7.50 pack-year smoking history. She has never used  smokeless tobacco. She reports current alcohol use of about 1.0 standard drink per week. She reports current drug use. Frequency: 2.00 times per week. Drug: Marijuana.  Social History     Social History Narrative    Not on file     Medications:  BASAGLAR MALLORIEIKPEN, FreeStyle Lite, HYDROcodone-acetaminophen, Insulin Lispro, acetaminophen, albuterol sulfate HFA, amLODIPine, calcium carbonate, carvedilol, ferrous sulfate, gabapentin, glucose blood, insulin detemir, lactobacillus acidophilus, loperamide, losartan, melatonin, mirtazapine, multivitamin, pantoprazole, rivaroxaban, sennosides-docusate, terazosin, traMADol, and vitamin D    No Known Allergies    Objective  Objective     Vital Signs:   Temp:  [98.6 °F (37 °C)] 98.6 °F (37 °C)  Heart Rate:  [103-114] 114  Resp:  [18] 18  BP: (130-236)/() 158/74    Physical Exam     Constitutional: Awake, alert; ill appearing   Eyes: PERRLA, sclerae anicteric, no conjunctival injection  HENT: NCAT, mucous membranes dry   Neck: Supple, no thyromegaly, no lymphadenopathy, trachea midline  Respiratory: Clear to auscultation bilaterally, nonlabored respirations   Cardiovascular: RRR, no murmurs, rubs, or gallops, no peripheral edema   Gastrointestinal: Positive bowel sounds, soft, nontender, nondistended  Musculoskeletal: Normal ROM BLE and RUE; LUE w/ sling in place   Psychiatric: Appropriate affect, cooperative  Neurologic: Oriented x 3, strength symmetric in all extremities, Cranial Nerves grossly intact to confrontation, speech clear  Skin: No rashes, lesions or wounds     Result Review:  I have personally reviewed the results from the time of this admission to 9/6/2023 05:55 EDT and agree with these findings:  [x]  Laboratory list / accordion  []  Microbiology  [x]  Radiology  []  EKG/Telemetry   []  Cardiology/Vascular   []  Pathology  [x]  Old records    LAB RESULTS:      Lab 09/05/23  2320   WBC 7.29   HEMOGLOBIN 13.0   HEMATOCRIT 39.1   PLATELETS 404   NEUTROS ABS 6.14    IMMATURE GRANS (ABS) 0.02   LYMPHS ABS 0.83   MONOS ABS 0.29   EOS ABS 0.00   MCV 86.9         Lab 09/05/23  2320   SODIUM 144   POTASSIUM 3.2*   CHLORIDE 103   CO2 19.0*   ANION GAP 22.0*   BUN 23   CREATININE 1.22*   EGFR 49.7*   GLUCOSE 316*   CALCIUM 9.1         Lab 09/05/23  2320   TOTAL PROTEIN 8.2   ALBUMIN 4.0   GLOBULIN 4.2   ALT (SGPT) 13   AST (SGOT) 36*   BILIRUBIN 0.5   ALK PHOS 111   LIPASE 26                     Lab 09/06/23  0426   PH, ARTERIAL 7.436   PCO2, ARTERIAL 32.2*   PO2 ART 83.3   FIO2 21   HCO3 ART 21.7   BASE EXCESS ART -1.9*   CARBOXYHEMOGLOBIN 0.9     Brief Urine Lab Results  (Last result in the past 365 days)        Color   Clarity   Blood   Leuk Est   Nitrite   Protein   CREAT   Urine HCG        09/05/23 2309 Yellow   Turbid   Large (3+)   Moderate (2+)   Negative   >=300 mg/dL (3+)                 Microbiology Results (last 10 days)       ** No results found for the last 240 hours. **          CT Abdomen Pelvis Without Contrast    Result Date: 9/6/2023  CT ABDOMEN PELVIS WO CONTRAST Date of Exam: 9/6/2023 12:19 AM EDT Indication: Abdominal pain, acute, nonlocalized. Comparison: 4/30/2023. Technique: Axial CT images were obtained of the abdomen and pelvis without the administration of contrast. Reconstructed coronal and sagittal images were also obtained. Automated exposure control and iterative construction methods were used. Findings: Lung Bases:   The visualized lung bases and lower mediastinal structures are unremarkable. Limited evaluation of the solid organs due to lack of intravenous contrast. Liver: Liver is normal in size and CT density. No focal lesions. Biliary/Gallbladder:  The gallbladder is normal without evidence of radiopaque stones. The biliary tree is nondilated. Spleen: Spleen is normal in size and CT density. Pancreas:  Pancreas is normal. There is no evidence of pancreatic mass or peripancreatic fluid. Kidneys:  Kidneys are normal in size. There are no stones or  hydronephrosis. Adrenals:  There is thickening of the bilateral adrenal glands likely related to hyperplasia, similar as compared to the previous study.. Retroperitoneal/Lymph Nodes/Vasculature:  No retroperitoneal adenopathy is identified. Gastrointestinal/Mesentery:  The bowel loops are non-dilated without wall thickening or mass. The appendix appears within normal limits. No evidence of obstruction. No free air. No mesenteric fluid collections identified. Gastric stimulator device seen within the anterior soft tissues on the right. No significant stool burden identified. Scattered diverticula are present. No significant inflammatory changes. Bladder:  Circumferential bladder wall thickening is present which appears similar as compared to the previous study. Bladder diverticulum present along the superior margin. Genital:   Unremarkable       Bony Structures:   Visualized bony structures are consistent with the patient's age.     Impression: Impression: 1. No acute intra-abdominal or intrapelvic process. Circumferential bladder wall thickening is present likely related to chronic cystitis or outlet obstruction, similar as compared to previous studies. 2. Ancillary findings as described above.. Electronically Signed: Anita Caraballo MD  9/6/2023 12:38 AM EDT  Workstation ID: [id number]    XR Chest 1 View    Result Date: 9/6/2023  XR CHEST 1 VW Date of Exam: 9/6/2023 5:05 AM EDT Indication: cough Comparison: None available. Findings: There are no airspace consolidations. No pleural fluid. No pneumothorax. The pulmonary vasculature appears within normal limits. The cardiac and mediastinal silhouette appear unremarkable. No acute osseous abnormality identified.     Impression: Impression: No acute cardiopulmonary process. Electronically Signed: Anita Caraballo MD  9/6/2023 5:33 AM EDT  Workstation ID: [id number]     Results for orders placed during the hospital encounter of 12/06/22    Adult Transthoracic Echo Complete W/ Cont  if Necessary Per Protocol    Interpretation Summary    Left ventricular ejection fraction appears to be greater than 70%.    Left ventricular wall thickness is consistent with moderate concentric hypertrophy.    Left ventricular diastolic function is consistent with (grade I) impaired relaxation.    Estimated right ventricular systolic pressure from tricuspid regurgitation is mildly elevated (35-45 mmHg). Calculated right ventricular systolic pressure from tricuspid regurgitation is 39 mmHg.    Assessment & Plan  Assessment & Plan       Hypertensive urgency    Hyperlipidemia    Hypertension    Uncontrolled type 1 diabetes mellitus with hyperglycemia    Gastroparesis    Hypokalemia    CKD (chronic kidney disease) stage 3, GFR 30-59 ml/min    Acute UTI (urinary tract infection)    Nausea vomiting and diarrhea    PAF (paroxysmal atrial fibrillation)    Anxiety associated with depression    Clavicle fracture    Thelma Michael is a 62 y.o. female w/ a hx of poorly controlled type 1 diabetes, Afib on Xarelto, iron deficiency anemia, gastroparesis s/p gastric stimulator, HTN, HLD, migraines, chronic urinary retention (previously performed self-catheterization), strokes (chronic right lentiform nucleus and left cerebellar lacunar infarcts found on MRI in February of 2023), tobacco abuse and GERD who presented to the emergency room with complaints of nausea, vomiting and diarrhea.    **Hypertensive urgency   -initial /122   -s/p Clonidine, hydralazine, labetalol given in ED w/ little improvement  -started on Cardene infusion  -continue routine meds including Coreg, Norvasc  -holding routine Losartan   -consider Cardiology consult   **Hyperglycemia   **T1DM  -ABG w/ CO2 32; anion gap 22.0  -initial glucose 316  -s/p 10 units regular insulin   -holding routine 20 units of levemir- restart when tolerating PO   -FSBG q ac/hs w/ sliding scale   -IVF   -repeat labs pending now   -diabetic educator consult   -hem A1c  pending    **Nausea, vomiting, diarrhea   **Hx of gastroparesis   -CT abd/pel without acute findings other than bladder wall thickening c/w chronic cystitis   -UA concerning for UTI   -lactic acid pending  -GI panel pcr pending   -respiratory panel pcr pending   -s/p 1 liter NS bolus   -continue NS @ 75ml/hour   -clear liquid diet- advance as tolerated     **Acute UTI   **Hx of neurogenic bladder (previously self-cath'd)  -UA w/ 4+ bacteria, 31-50 WBCs   -urine culture pending   -blood cx pending   -lactic and procal pending   -previous urine culture + for enterococcus faecalis in 4/23 (previous results w/ mixed sameera)  -IVF  -IV Rocephin started pending culture results   -bladder scan q 4     **Hypokalemia   -K 3.2  -replace per protocol     **CKD Stage III  -baseline CR 1.2-1.3  -current 1.22 w/ eGFR 49.7  -IVF  -monitor     **PAF   -continue coreg, Xarelto   -EKG pending     DVT prophylaxis:  Xarelto     CODE STATUS:    Code Status (Patient has no pulse and is not breathing): CPR (Attempt to Resuscitate)  Medical Interventions (Patient has pulse or is breathing): Full Support    Expected Discharge    This note has been completed as part of a split-shared workflow.     Signature: Electronically signed by HOWIE Moreno, 09/06/23, 5:55 AM EDT.    Time spent: 55 minutes         Attending   Admission Attestation       I have performed an independent face-to-face diagnostic evaluation including performing an independent physical examination.  The documented plan of care above was reviewed and developed with the advanced practice clinician (APC).  I have updated the HPI as appropriate.    Brief HPI    62 F who states that she has had diarrhea as well as nausea with emesis over the last 2 days.  She notes that the diarrhea got worse this morning (Tuesday).  He states that because of the symptoms she has had almost no oral intake of food or fluids over the last 48 hours.  She has also not been able to take her  home medications, including her diabetes medications or her antihypertensives.  She also notes that she has had 3 episodes of diarrhea in the ED; no kalie blood in any of the stools or emesis.    Attending Physical Exam:  Temp:  [98.6 °F (37 °C)] 98.6 °F (37 °C)  Heart Rate:  [103-114] 114  Resp:  [18] 18  BP: (130-236)/() 158/74    Constitutional: Awake, alert, appears chronically ill.  Eyes: PERRLA, sclerae anicteric, no conjunctival injection  HENT: NCAT, mucous membranes dry.  Neck: Supple, no thyromegaly, no lymphadenopathy, trachea midline  Respiratory: Clear to auscultation bilaterally, nonlabored respirations   Cardiovascular: RRR, no murmurs, rubs, or gallops, palpable pedal pulses bilaterally  Gastrointestinal: Positive but moderately hypoactive bowel sounds, soft, nontender, nondistended  Musculoskeletal: No bilateral ankle edema, no clubbing or cyanosis to extremities  Psychiatric: Appropriate affect, cooperative  Neurologic: Oriented x 3, strength symmetric in all extremities but LUE is in a sling so it is not tested; cranial Nerves grossly intact to confrontation, speech quiet but clear  Skin: No rashes, poor turgor.    Assessment and Plan:      Total time spent: 24 minutes  Time spent includes time reviewing chart, face-to-face time, counseling patient/family/caregiver, ordering medications/tests/procedures, communicating with other health care professionals, documenting clinical information in the electronic health record, and coordination of care.       See assessment and plan documented by APC above and updated/edited by me as appropriate.    Martin Martinez III, DO  09/06/23                     Electronically signed by Martin Martinez III, DO at 09/06/23 5724          Emergency Department Notes        Nas Patel MD at 09/05/23 1220          Subjective   History of Present Illness  Is a 62-year-old female with longstanding history of GERD, hypertension presenting to  the emergency department with some nausea and vomiting.  Patient states that she has not been able to keep any of her medications down because she is been so nauseous today.  She feels very weak.  She complained of some pain in the epigastric region.  Feels a burning sensation.  Patient feels that she is dehydrated because she has been vomiting so much today.  She is also been having some loose stools for the last 3 days.  She denies any blood.  No mucus.  She does not have any fevers or chills.  No headache or change in vision.  No focal weakness.  No chest pain or shortness of breath.    History provided by:  Patient   used: No      Review of Systems   Constitutional:  Negative for chills and fever.   HENT:  Negative for congestion, ear pain and sore throat.    Eyes:  Negative for visual disturbance.   Respiratory:  Negative for shortness of breath.    Cardiovascular:  Negative for chest pain.   Gastrointestinal:  Positive for abdominal pain, diarrhea, nausea and vomiting.   Genitourinary:  Negative for difficulty urinating.   Musculoskeletal:  Negative for arthralgias.   Skin:  Negative for rash.   Neurological:  Negative for dizziness, weakness and numbness.   Psychiatric/Behavioral:  Negative for agitation.      Past Medical History:   Diagnosis Date    Acid reflux     Acute bronchitis     Cardiac murmur     Depression with anxiety 10/5/2020    Diabetes mellitus     Gastroesophageal reflux disease 5/13/2016    H/O echocardiogram 08/07/2012    i. LVEF 65%.ii. Mild LVH.iii. Borderline evidence of atrial septal aneurysm.  No PFO.     History of nuclear stress test 08/22/2014    Negative for ischemia and scars; LVEF 77%.      History of TIAs 7/15/2021    Hyperlipidemia     Hypertension     Impacted cerumen of both ears     Migraine     Migraines 10/31/2019    Self-catheterizes urinary bladder     Sinusitis     Stroke     Tobacco abuse     quit 4 days ago.      Urticaria     Vitamin D deficiency  2016       No Known Allergies    Past Surgical History:   Procedure Laterality Date    CAPSULE ENDOSCOPY  2021    Procedure: PILLCAM DEPLOYMENT;  Surgeon: Mikael Worthy MD;  Location:  [facility];  Service: Gastroenterology;;    COLONOSCOPY      COLONOSCOPY N/A 2021    Procedure: COLONOSCOPY;  Surgeon: Mikael Worthy MD;  Location:  [facility];  Service: Gastroenterology;  Laterality: N/A;    DENTAL PROCEDURE      ENDOSCOPY N/A 2021    Procedure: ESOPHAGOGASTRODUODENOSCOPY;  Surgeon: Brunner, Mark I, MD;  Location:  [facility];  Service: Gastroenterology;  Laterality: N/A;    ENDOSCOPY N/A 2021    Procedure: ESOPHAGOGASTRODUODENOSCOPY;  Surgeon: Mikael Worthy MD;  Location:  [facility];  Service: Gastroenterology;  Laterality: N/A;    ENDOSCOPY WITH JTUBE N/A 2022    Procedure: ESOPHAGOGASTRODUODENOSCOPY WITH JEJUNAL TUBE INSERTION;  Surgeon: Thom Glover MD;  Location:  [facility];  Service: Gastroenterology;  Laterality: N/A;    UPPER GASTROINTESTINAL ENDOSCOPY         Family History   Problem Relation Age of Onset    Anxiety disorder Other     Arthritis Other     ADD / ADHD Other     Heart disease Other         cardiac disorder    Depression Other     Diabetes Other     Hyperlipidemia Other     Hypertension Other     Lung cancer Other     Osteoporosis Other     Hypertension Brother     Diabetes Brother     Hyperlipidemia Mother     Hypertension Mother     Colon cancer Neg Hx        Social History     Socioeconomic History    Marital status:    Tobacco Use    Smoking status: Former     Packs/day: 0.25     Years: 30.00     Pack years: 7.50     Types: Cigarettes     Quit date: 2019     Years since quittin.2    Smokeless tobacco: Never    Tobacco comments:     BC PL never smoker    Vaping Use    Vaping Use: Never used   Substance and Sexual Activity    Alcohol use: Yes     Alcohol/week: 1.0 standard drink     Types: 1  Glasses of wine per week     Comment: ocassional    Drug use: Yes     Frequency: 2.0 times per week     Types: Marijuana    Sexual activity: Not Currently     Comment: Single            Objective   Physical Exam  Vitals and nursing note reviewed.   Constitutional:       General: She is not in acute distress.     Appearance: She is not ill-appearing or toxic-appearing.   HENT:      Mouth/Throat:      Pharynx: No posterior oropharyngeal erythema.   Eyes:      Conjunctiva/sclera: Conjunctivae normal.      Pupils: Pupils are equal, round, and reactive to light.   Cardiovascular:      Rate and Rhythm: Regular rhythm. Tachycardia present.   Pulmonary:      Effort: Pulmonary effort is normal. No respiratory distress.   Abdominal:      General: Abdomen is flat. There is no distension.      Palpations: There is no mass.      Tenderness: There is abdominal tenderness. There is no guarding or rebound.      Comments: Minimal epigastric tenderness   Musculoskeletal:         General: No deformity. Normal range of motion.   Skin:     General: Skin is warm.      Findings: No rash.   Neurological:      General: No focal deficit present.      Mental Status: She is alert and oriented to person, place, and time.      Motor: No weakness.       Procedures          ED Course  ED Course as of 09/06/23 0512   Wed Sep 06, 2023   0155 BP(!): 209/101 [JK]   0155 Temp: 98.6 °F (37 °C) [JK]   0155 Temp src: Oral [JK]   0155 Heart Rate: 103 [JK]   0155 Resp: 18 [JK]   0155 SpO2: 99 %  Patient's repeat vitals, telemetry tracing, and pulse oximetry tracing were directly viewed and interpreted by myself.  Initial blood pressure was 209/101, heart rate slightly elevated 103 bpm [JK]   0155 CBC & Differential(!) [JK]   0155 Comprehensive Metabolic Panel(!) [JK]   0155 Lipase [JK]   0155 Urinalysis, Microscopic Only - Urine, Clean Catch(!) [JK]   0155 Urinalysis With Culture If Indicated - Urine, Clean Catch(!)  Laboratory studies were reviewed and  interpreted directly by myself.  CBC was normal, CMP showed some elevated glucose with a level of 316, creatinine slightly elevated 1.2, urinalysis showed bacteria, white blood cells, leukocytes as well as some glucose [JK]   0510 CT Abdomen Pelvis Without Contrast  Imaging was directly visualized by myself, per my interpretations, CT of the abdomen pelvis did not show any acute process. [JK]   0510 Patient received multiple rounds of antihypertensive medications, however she continues to remain hypertensive.  Symptoms are consistent with hypertensive urgency.  We did place patient on nicardipine drip.  Patient mated to the hospital for further evaluation and treatment [JK]   0510 Based on the patient's presentation, history and diffuse work-up in the emergency department, the patient is deemed appropriate for admission to the hospital for further evaluation and treatment.  This was discussed with the patient at bedside.  They are in agreement with the current medical management.    Admitting physician: Dr. Martinez    Discussion was had with admitting physician regarding the laboratory and imaging findings.  We did discuss current therapeutics in the emergency department and progression of the patient.  Working diagnosis was conveyed to the admitting physician, as well as current status and prognosis for the patient.  They are in agreement with these findings and have accepted admission.    Shared decision making:   After full review of the patient's clinical presentation, review of any work-up including but not limited to laboratory studies and radiology obtained, I had a discussion with the patient.  Treatment options were discussed as well as the risks, benefits and consequences.  I discussed all findings with the patient and family members if available.  During the discussion, treatment goals were understood by all as well as any misconceptions which were addressed with the patient.  Ample time was given for any  questions they may have had.  They are in agreement with the treatment plan as well as final disposition. [JK]      ED Course User Index  [JK] Nas Patel MD                                           Medical Decision Making  This is a 62-year-old female presented to the emergency department with some nausea vomiting diarrhea and abdominal discomfort.  The patient's symptoms seem consistent with gastroenteritis and dyspepsia.  Overall she appears nontoxic.  Her abdomen is relatively benign at this time.  Is no evidence of acute abdomen or peritonitis.  Patient is afebrile.  IV access will be established the patient.  She was provided symptomatic treatment.  Work-up initiated.      Differential diagnosis: Gastroenteritis, gastritis, gastroparesis, dyspepsia, peptic ulcer disease, pancreatitis, acute kidney injury, electrolyte abnormality      Amount and/or Complexity of Data Reviewed  External Data Reviewed: labs and notes.     Details: External laboratories, imaging as well as notes were reviewed personally by myself.  All relevant studies were used to guide decision making.     Date of previous record: 8/11/2023    Source of note: Primary care physician    Summary: Patient was seen and evaluated for some chronic diarrhea.  She did have PCR and stool panel performed.  I did review this.  She did have some E. coli as well as a positive C. difficile PCR, however there was no antigen.  I did review the other notes and records on file.  Labs: ordered. Decision-making details documented in ED Course.  Radiology: ordered and independent interpretation performed. Decision-making details documented in ED Course.    Risk  OTC drugs.  Prescription drug management.    Critical Care  Total time providing critical care: 39 minutes (Authorized and performed by: Nas Patel MD  I personally spent a total of 39 minutes of critical care time with the patient.  Due to the high probability of clinically significant,  life-threatening deterioration, the patient required my highest level of care to intervene emergently.  These interventions, including, but not limited to, establishing IV access, continuous pulse oximeter and telemetry monitoring, frequent monitoring and reevaluations, management the patient's airway and cardiovascular system, discussion with other consultants as needed, which bear directly on the management the patient.  This also includes obtaining history, examining the patient, frequent reevaluations and coordinating high level of care.  Failure to emergently initiate these interventions would carry a high probability of resulting in sudden, clinically significant or life threatening deterioration in the patient's condition.  This does not include time spent on separately reported billable procedures.)      Final diagnoses:   Hypertensive urgency   Acute kidney injury   Hyperglycemia   Acute superficial gastritis without hemorrhage       ED Disposition  ED Disposition       ED Disposition   Decision to Admit    Condition   --    Comment   --               No follow-up provider specified.       Medication List      No changes were made to your prescriptions during this visit.            Nas Patel MD  09/06/23 0512      Electronically signed by Nas Patel MD at 09/06/23 0512       Vital Signs (last 2 days)       Date/Time Temp Temp src Pulse Resp BP Patient Position SpO2    09/11/23 1200 98.5 (36.9) Oral 77 16 125/78 Lying 100    09/11/23 1012 -- -- 77 -- -- -- 98    09/11/23 1000 -- -- 78 -- -- -- 98    09/11/23 0900 -- -- 83 -- -- -- 99    09/11/23 0800 -- -- 85 -- -- -- 98    09/11/23 0713 101 (38.3)  Oral 85 16 156/67 Lying 98    Temp: rn notified at 09/11/23 0713    09/11/23 0626 100.9 (38.3) Oral -- -- -- -- --    09/11/23 0428 101.3 (38.5) Oral 92 16 158/74 Lying 99    09/11/23 0028 99.3 (37.4) Oral 82 16 154/73 Lying --    09/10/23 1949 99.5 (37.5) Oral 78 16 133/75 Lying 99     09/10/23 1811 -- -- 75 -- -- -- 99    09/10/23 1800 -- -- 76 -- -- -- 99    09/10/23 1700 -- -- 78 -- -- -- 99    09/10/23 1611 -- -- 83 -- -- -- 99    09/10/23 1600 -- -- 81 -- -- -- 99    09/10/23 1519 99.9 (37.7) Oral 75 16 131/62 Lying 98    09/10/23 1518 -- -- -- -- 131/62 -- --    09/10/23 1400 -- -- 78 -- -- -- 98    09/10/23 1320 99.6 (37.6) Oral 82 -- 149/71 Sitting 98    09/10/23 1319 -- -- -- -- 149/71 -- --    09/10/23 1300 -- -- 84 -- -- -- 97    09/10/23 1206 100.5 (38.1) Oral 88 -- -- -- --    09/10/23 1200 -- -- 90 -- -- -- 96    09/10/23 1100 100.1 (37.8)  Oral 92 16 152/70 Lying 97    Temp: nurse notified at 09/10/23 1100    09/10/23 1000 -- -- 93 -- 152/72 -- 97    09/10/23 0900 -- -- 89 -- 158/75 -- 97    09/10/23 0800 -- -- -- -- 142/61 -- --    09/10/23 0700 -- -- 82 -- 137/57 -- 98    09/10/23 0600 98.4 (36.9) Oral 75 16 143/68 Lying 100    09/10/23 0500 -- -- -- -- 131/61 -- --    09/10/23 0400 97.6 (36.4) Oral 65 16 117/57 Lying 98    09/10/23 0300 -- -- -- -- 125/60 -- --    09/10/23 0200 -- -- -- -- 106/49 -- --    09/10/23 0100 -- -- -- -- 135/62 -- --    09/10/23 0006 -- -- -- -- 131/64 -- --    09/10/23 0000 98.4 (36.9) Oral 72 16 131/64 Lying 97    09/09/23 2357 -- -- -- -- 132/116 -- --    09/09/23 2230 98.4 (36.9) Axillary 64 -- 103/49 -- 98    09/09/23 1915 101.3 (38.5) Oral 77 16 106/53 Lying 96    09/09/23 1914 -- -- -- -- 106/53 -- --    09/09/23 1905 102.4 (39.1) Oral -- -- -- -- --    09/09/23 1902 -- -- -- -- 106/50 -- --    09/09/23 1900 -- -- -- -- 106/50 -- --    09/09/23 1830 -- -- -- -- 109/48 -- --    09/09/23 1819 102.6 (39.2) Oral 81 -- -- -- 97    09/09/23 1800 -- -- -- -- 113/45 -- --    09/09/23 1730 -- -- -- -- 135/54 -- --    09/09/23 1700 -- -- -- -- 148/60 -- --    09/09/23 1630 -- -- -- -- 145/58 -- --    09/09/23 1600 -- -- -- -- 130/86 -- --    09/09/23 1530 -- -- -- -- 135/66 -- --    09/09/23 1500 103.1 (39.5)  Oral 94 16 132/58 Lying --    Temp: nurse  notified at 09/09/23 1500    09/09/23 1430 -- -- -- -- 133/64 -- --    09/09/23 1400 -- -- -- -- 144/55 -- --    09/09/23 1330 -- -- -- -- 135/54 -- --    09/09/23 1300 -- -- -- -- 128/68 -- --    09/09/23 1230 -- -- -- -- 139/63 -- --    09/09/23 1200 -- -- -- -- 149/66 -- --    09/09/23 1130 -- -- -- -- 141/61 -- --    09/09/23 1100 98.6 (37) Oral 84 16 145/67 Lying --    09/09/23 1030 -- -- -- -- 147/59 -- --    09/09/23 1000 -- -- -- -- 167/67 -- --    09/09/23 0933 -- -- -- -- 155/65 -- --    09/09/23 0930 -- -- -- -- 155/65 -- --    09/09/23 0900 -- -- 89 -- 142/57 -- --    09/09/23 0830 -- -- -- -- 119/51 -- --    09/09/23 0800 -- -- -- -- 134/58 -- --    09/09/23 0730 -- -- -- -- 140/58 -- --    09/09/23 0718 -- -- -- -- 181/78 -- --    09/09/23 0713 -- -- 107 -- 183/87 -- --    09/09/23 0700 -- -- 104 -- -- -- --    09/09/23 0654 -- -- -- -- 194/90 -- --    09/09/23 0652 102.2 (39)  Oral 99 16 194/90 Lying --    Temp: nurse notified at 09/09/23 0652    09/09/23 0650 -- -- -- -- 203/87 -- --    09/09/23 0544 100 (37.8) Oral 96 16 173/74 Lying --    09/09/23 0542 -- -- -- -- 173/74 -- --    09/09/23 0022 101.9 (38.8) Oral 97 16 180/86 Lying --          Oxygen Therapy (last 2 days)       Date/Time SpO2 Device (Oxygen Therapy) Flow (L/min) Oxygen Concentration (%) ETCO2 (mmHg)    09/11/23 1200 100 room air -- -- --    09/11/23 1012 98 room air -- -- --    09/11/23 1000 98 -- -- -- --    09/11/23 0900 99 -- -- -- --    09/11/23 0830 -- room air -- -- --    09/11/23 0800 98 -- -- -- --    09/11/23 0713 98 -- -- -- --    09/11/23 0612 -- room air -- -- --    09/11/23 0428 99 -- -- -- --    09/11/23 0413 -- room air -- -- --    09/11/23 0213 -- room air -- -- --    09/11/23 0008 -- room air -- -- --    09/10/23 2210 -- room air -- -- --    09/10/23 2055 -- room air -- -- --    09/10/23 1949 99 -- -- -- --    09/10/23 1811 99 room air -- -- --    09/10/23 1800 99 -- -- -- --    09/10/23 1700 99 -- -- -- --     09/10/23 1611 99 room air -- -- --    09/10/23 1600 99 -- -- -- --    09/10/23 1519 98 room air -- -- --    09/10/23 1452 -- room air -- -- --    09/10/23 1400 98 -- -- -- --    09/10/23 1320 98 -- -- -- --    09/10/23 1300 97 -- -- -- --    09/10/23 1205 -- room air -- -- --    09/10/23 1200 96 -- -- -- --    09/10/23 1100 97 room air -- -- --    09/10/23 1010 -- room air -- -- --    09/10/23 1000 97 -- -- -- --    09/10/23 0900 97 -- -- -- --    09/10/23 0800 -- room air -- -- --    09/10/23 0700 98 -- -- -- --    09/10/23 0600 100 room air -- -- --    09/10/23 0400 98 room air -- -- --    09/10/23 0200 -- room air -- -- --    09/10/23 0000 97 room air -- -- --    09/09/23 2230 98 -- -- -- --    09/09/23 2200 -- room air -- -- --    09/09/23 2000 -- room air -- -- --    09/09/23 1915 96 -- -- -- --    09/09/23 1819 97 -- -- -- --    09/09/23 1803 -- room air -- -- --    09/09/23 1600 -- room air -- -- --    09/09/23 1500 -- room air -- -- --    09/09/23 1400 -- room air -- -- --    09/09/23 1200 -- room air -- -- --    09/09/23 1100 -- room air -- -- --    09/09/23 1000 -- room air -- -- --    09/09/23 0805 -- room air -- -- --    09/09/23 0652 -- room air -- -- --    09/09/23 0600 -- room air -- -- --    09/09/23 0400 -- room air -- -- --    09/09/23 0200 -- room air -- -- --    09/09/23 0000 -- room air -- -- --          Lines, Drains & Airways       Active LDAs       Name Placement date Placement time Site Days    Peripheral IV 09/07/23 2227 Anterior;Proximal;Right Forearm 09/07/23 2227  Forearm  3    External Urinary Catheter 09/09/23  1336  --  1    Insulin Pump 07/03/22  0800  -- 435                  Current Facility-Administered Medications   Medication Dose Route Frequency Provider Last Rate Last Admin    acetaminophen (TYLENOL) tablet 650 mg  650 mg Oral Q6H PRN HARI Jimenez DO   650 mg at 09/11/23 0920    amLODIPine (NORVASC) tablet 10 mg  10 mg Oral Q24H Magali Delvalle APRN   10 mg at 09/11/23  0905    carvedilol (COREG) tablet 12.5 mg  12.5 mg Oral BID With Meals Susan Cohen APRN   12.5 mg at 09/11/23 0905    dextrose (D50W) (25 g/50 mL) IV injection 25 g  25 g Intravenous Q15 Min PRN Magali Delvalle APRN        dextrose (GLUTOSE) oral gel 15 g  15 g Oral Q15 Min PRN Magali Delvalle APRN        famotidine (PEPCID) tablet 20 mg  20 mg Oral QAM Ita Womack, PharmD   20 mg at 09/11/23 0906    ferrous sulfate tablet 325 mg  325 mg Oral Daily With Breakfast Magali Delvalle APRN   325 mg at 09/11/23 0906    glucagon (GLUCAGEN) injection 1 mg  1 mg Intramuscular Q15 Min PRN Magali Delvalle APRN        hydrALAZINE (APRESOLINE) injection 10 mg  10 mg Intravenous Q6H PRN HARI Jimenez, DO   10 mg at 09/08/23 1023    hydrALAZINE (APRESOLINE) tablet 50 mg  50 mg Oral Q8H HARI Jimenez, DO   50 mg at 09/11/23 0440    ibuprofen (ADVIL,MOTRIN) tablet 400 mg  400 mg Oral Q6H HARI Jimenez, DO   400 mg at 09/11/23 1133    insulin detemir (LEVEMIR) injection 10 Units  10 Units Subcutaneous Daily HARI Jimenez, DO   10 Units at 09/11/23 0905    Insulin Lispro (humaLOG) injection 2-7 Units  2-7 Units Subcutaneous 4x Daily AC & at Bedtime Magali Delvalle APRN   3 Units at 09/11/23 1204    lactobacillus acidophilus (RISAQUAD) capsule 1 capsule  1 capsule Oral Daily Magali Delvalle APRN   1 capsule at 09/11/23 0905    Lidocaine 4 % 1 patch  1 patch Transdermal Q24H Faith Nelson MD   1 patch at 09/10/23 1010    melatonin tablet 5 mg  5 mg Oral Nightly PRN Magali Delvalle APRN   5 mg at 09/10/23 2116    mirtazapine (REMERON) tablet 7.5 mg  7.5 mg Oral Nightly Martin Martinez III, DO   7.5 mg at 09/10/23 2116    multivitamin (THERAGRAN) tablet 1 tablet  1 tablet Oral Daily Magali Delvalle APRN   1 tablet at 09/11/23 0906    ondansetron (ZOFRAN) tablet 4 mg  4 mg Oral Q6H PRN Magali Delvalle APRN   4 mg at 09/06/23 2105    Or    ondansetron (ZOFRAN) injection 4 mg  4 mg Intravenous Q6H PRN Mook  HOWIE De Los Santos   4 mg at 09/07/23 2227    oxyCODONE-acetaminophen (PERCOCET) 5-325 MG per tablet 1 tablet  1 tablet Oral Q6H PRN Faith Nelson MD   1 tablet at 09/10/23 2116    piperacillin-tazobactam (ZOSYN) 3.375 g in iso-osmotic dextrose 50 ml (premix)  3.375 g Intravenous Once Rene García PA        piperacillin-tazobactam (ZOSYN) 3.375 g in iso-osmotic dextrose 50 ml (premix)  3.375 g Intravenous Q8H Rene García PA        Potassium Replacement - Follow Nurse / BPA Driven Protocol   Does not apply PRN Susan Cohen APRN        prochlorperazine (COMPAZINE) injection 5 mg  5 mg Intravenous Q6H PRN HARI Jimenez, DO   5 mg at 09/07/23 1120    Or    prochlorperazine (COMPAZINE) tablet 5 mg  5 mg Oral Q6H PRN HARI Jimenez, DO   5 mg at 09/07/23 2057    Or    prochlorperazine (COMPAZINE) suppository 25 mg  25 mg Rectal Q12H PRN HARI Jimenez, DO        rivaroxaban (XARELTO) tablet 15 mg  15 mg Oral Daily With Dinner Magali Delvalle APRN   15 mg at 09/10/23 1702    sodium chloride 0.9 % flush 10 mL  10 mL Intravenous PRN Nas Patel MD        sodium chloride 0.9 % flush 10 mL  10 mL Intravenous Q12H Magali Delvalle APRN   10 mL at 09/11/23 0907    sodium chloride 0.9 % flush 10 mL  10 mL Intravenous PRN Magali Delvalle APRN        sodium chloride 0.9 % infusion 40 mL  40 mL Intravenous PRN Magali Delvalle APRN        terazosin (HYTRIN) capsule 1 mg  1 mg Oral Nightly Magali Devlalle APRN   1 mg at 09/10/23 2116    vancomycin (VANCOCIN) capsule 125 mg  125 mg Oral BID Rene García PA   125 mg at 09/11/23 0906     Lab Results (last 48 hours)       Procedure Component Value Units Date/Time    POC Glucose Once [[id number]]  (Abnormal) Collected: 09/11/23 1144    Specimen: Blood Updated: 09/11/23 1145     Glucose 214 mg/dL     Blood Culture - Blood, Hand, Left [[id number]]  (Normal) Collected: 09/07/23 0752    Specimen: Blood from Hand, Left Updated: 09/11/23 0815     Blood Culture No growth at 4 days     Narrative:      Aerobic bottle only      Blood Culture - Blood, Hand, Right [[id number]]  (Normal) Collected: 09/07/23 0750    Specimen: Blood from Hand, Right Updated: 09/11/23 0815     Blood Culture No growth at 4 days    POC Glucose Once [[id number]]  (Normal) Collected: 09/11/23 0725    Specimen: Blood Updated: 09/11/23 0729     Glucose 117 mg/dL     Magnesium [[id number]]  (Normal) Collected: 09/11/23 0412    Specimen: Blood Updated: 09/11/23 0547     Magnesium 1.9 mg/dL     Comprehensive Metabolic Panel [[id number]]  (Abnormal) Collected: 09/11/23 0412    Specimen: Blood Updated: 09/11/23 0547     Glucose 93 mg/dL      BUN 15 mg/dL      Creatinine 1.19 mg/dL      Sodium 143 mmol/L      Potassium 4.1 mmol/L      Chloride 112 mmol/L      CO2 22.0 mmol/L      Calcium 8.8 mg/dL      Total Protein 6.6 g/dL      Albumin 3.2 g/dL      ALT (SGPT) 7 U/L      AST (SGOT) 13 U/L      Alkaline Phosphatase 79 U/L      Total Bilirubin 0.3 mg/dL      Globulin 3.4 gm/dL      Comment: Calculated Result        A/G Ratio 0.9 g/dL      BUN/Creatinine Ratio 12.6     Anion Gap 9.0 mmol/L      eGFR 51.2 mL/min/1.73     Narrative:      GFR Normal >60  Chronic Kidney Disease <60  Kidney Failure <15      CBC & Differential [[id number]]  (Abnormal) Collected: 09/11/23 0412    Specimen: Blood Updated: 09/11/23 0541    Narrative:      The following orders were created for panel order CBC & Differential.  Procedure                               Abnormality         Status                     ---------                               -----------         ------                     CBC Auto Differential[[id number]]        Abnormal            Final result                 Please view results for these tests on the individual orders.    CBC Auto Differential [[id number]]  (Abnormal) Collected: 09/11/23 0412    Specimen: Blood Updated: 09/11/23 0541     WBC 13.98 10*3/mm3      RBC 3.96 10*6/mm3      Hemoglobin 11.6 g/dL      Hematocrit 35.1 %      MCV  88.6 fL      MCH 29.3 pg      MCHC 33.0 g/dL      RDW 13.9 %      RDW-SD 45.4 fl      MPV 10.2 fL      Platelets 417 10*3/mm3      Neutrophil % 82.4 %      Lymphocyte % 10.0 %      Monocyte % 6.9 %      Eosinophil % 0.1 %      Basophil % 0.2 %      Immature Grans % 0.4 %      Neutrophils, Absolute 11.51 10*3/mm3      Lymphocytes, Absolute 1.40 10*3/mm3      Monocytes, Absolute 0.96 10*3/mm3      Eosinophils, Absolute 0.02 10*3/mm3      Basophils, Absolute 0.03 10*3/mm3      Immature Grans, Absolute 0.06 10*3/mm3      nRBC 0.0 /100 WBC     POC Glucose Once [[id number]]  (Abnormal) Collected: 09/10/23 2113    Specimen: Blood Updated: 09/10/23 2121     Glucose 197 mg/dL     Blood Culture - Blood, Hand, Right [[id number]]  (Normal) Collected: 09/09/23 1751    Specimen: Blood from Hand, Right Updated: 09/10/23 1845     Blood Culture No growth at 24 hours    Blood Culture - Blood, Hand, Left [[id number]]  (Normal) Collected: 09/09/23 1747    Specimen: Blood from Hand, Left Updated: 09/10/23 1845     Blood Culture No growth at 24 hours    POC Glucose Once [[id number]]  (Normal) Collected: 09/10/23 1643    Specimen: Blood Updated: 09/10/23 1645     Glucose 106 mg/dL     Potassium [[id number]]  (Normal) Collected: 09/10/23 1545    Specimen: Blood Updated: 09/10/23 1634     Potassium 5.2 mmol/L     POC Glucose Once [[id number]]  (Normal) Collected: 09/10/23 1128    Specimen: Blood Updated: 09/10/23 1140     Glucose 130 mg/dL     POC Glucose Once [[id number]]  (Abnormal) Collected: 09/10/23 0656    Specimen: Blood Updated: 09/10/23 0708     Glucose 149 mg/dL     Blood Culture - Blood, Arm, Left [[id number]]  (Abnormal) Collected: 09/06/23 0550    Specimen: Blood from Arm, Left Updated: 09/10/23 0606     Blood Culture Staphylococcus hominis ssp hominis     Isolated from Aerobic Bottle     Gram Stain Aerobic Bottle Gram positive cocci in groups    Narrative:      Probable contaminant requires clinical correlation, susceptibility not  performed unless requested by physician.        Blood Culture - Blood, Arm, Left [[id number]]  (Abnormal) Collected: 09/06/23 0600    Specimen: Blood from Arm, Left Updated: 09/10/23 0606     Blood Culture Staphylococcus warneri     Isolated from Aerobic Bottle     Gram Stain Aerobic Bottle Gram positive cocci in groups    Narrative:      Probable contaminant requires clinical correlation, susceptibility not performed unless requested by physician.        CBC & Differential [[id number]]  (Abnormal) Collected: 09/10/23 0343    Specimen: Blood Updated: 09/10/23 0555    Narrative:      The following orders were created for panel order CBC & Differential.  Procedure                               Abnormality         Status                     ---------                               -----------         ------                     CBC Auto Differential[[id number]]        Abnormal            Final result               Scan Slide[[id number]]                   Normal              Final result                 Please view results for these tests on the individual orders.    Scan Slide [[id number]]  (Normal) Collected: 09/10/23 0343    Specimen: Blood Updated: 09/10/23 0555     RBC Morphology Normal     WBC Morphology Normal     Platelet Morphology Normal    CBC Auto Differential [[id number]]  (Abnormal) Collected: 09/10/23 0343    Specimen: Blood Updated: 09/10/23 0555     WBC 6.77 10*3/mm3      RBC 3.57 10*6/mm3      Hemoglobin 10.4 g/dL      Hematocrit 32.4 %      MCV 90.8 fL      MCH 29.1 pg      MCHC 32.1 g/dL      RDW 13.7 %      RDW-SD 45.7 fl      MPV 9.9 fL      Platelets 306 10*3/mm3      Neutrophil % 54.4 %      Lymphocyte % 33.5 %      Monocyte % 10.8 %      Eosinophil % 0.3 %      Basophil % 0.4 %      Immature Grans % 0.6 %      Neutrophils, Absolute 3.68 10*3/mm3      Lymphocytes, Absolute 2.27 10*3/mm3      Monocytes, Absolute 0.73 10*3/mm3      Eosinophils, Absolute 0.02 10*3/mm3      Basophils, Absolute 0.03 10*3/mm3       Immature Grans, Absolute 0.04 10*3/mm3      nRBC 0.0 /100 WBC     Comprehensive Metabolic Panel [[id number]]  (Abnormal) Collected: 09/10/23 0343    Specimen: Blood Updated: 09/10/23 0552     Glucose 76 mg/dL      BUN 25 mg/dL      Creatinine 1.50 mg/dL      Sodium 141 mmol/L      Potassium 3.4 mmol/L      Chloride 111 mmol/L      CO2 22.0 mmol/L      Calcium 8.2 mg/dL      Total Protein 5.7 g/dL      Albumin 2.7 g/dL      ALT (SGPT) 7 U/L      AST (SGOT) 11 U/L      Alkaline Phosphatase 62 U/L      Total Bilirubin 0.3 mg/dL      Globulin 3.0 gm/dL      Comment: Calculated Result        A/G Ratio 0.9 g/dL      BUN/Creatinine Ratio 16.7     Anion Gap 8.0 mmol/L      eGFR 38.8 mL/min/1.73     Narrative:      GFR Normal >60  Chronic Kidney Disease <60  Kidney Failure <15      CK [[id number]]  (Normal) Collected: 09/10/23 0343    Specimen: Blood Updated: 09/10/23 0542     Creatine Kinase 45 U/L     Magnesium [[id number]]  (Normal) Collected: 09/10/23 0343    Specimen: Blood Updated: 09/10/23 0542     Magnesium 2.1 mg/dL     POC Glucose Once [[id number]]  (Abnormal) Collected: 09/09/23 2008    Specimen: Blood Updated: 09/09/23 2010     Glucose 140 mg/dL     Lactic Acid, Plasma [[id number]]  (Normal) Collected: 09/09/23 1747    Specimen: Blood Updated: 09/09/23 1837     Lactate 1.0 mmol/L      Comment: Falsely depressed results may occur on samples drawn from patients receiving N-Acetylcysteine (NAC) or Metamizole.       POC Glucose Once [[id number]]  (Abnormal) Collected: 09/09/23 1625    Specimen: Blood Updated: 09/09/23 1636     Glucose 321 mg/dL           Imaging Results (Last 48 Hours)       Procedure Component Value Units Date/Time    CT Abdomen Pelvis With Contrast [[id number]] Resulted: 09/11/23 1046     Updated: 09/11/23 1123          ECG/EMG Results (last 48 hours)       Procedure Component Value Units Date/Time    SCANNED - TELEMETRY   [[id number]] Resulted: 09/05/23     Updated: 09/11/23 0224             Physician  Progress Notes (last 48 hours)        Rene García PA at 09/11/23 1135              INFECTIOUS DISEASE PROGRESS NOTE    Thelma Michael  1960  [id number]    Date of Consult: 9/9/2023    Admission Date: 9/5/2023      Requesting Provider: Skip Jimenez DO  Evaluating Physician: Pedro Tucker MD    Reason for Consultation: febrile, recent diarrhea/E. coli infection, ? Contaminated blood cultures    History of present illness:    Patient is a 62 y.o. female with h/o T1DM/poorly controlled, afib/Xarelto, anemia, gastroparesis/gastric stimulator, , GERD, TIAs, HLD, HTN, CKD Stage IIIa (baseline 0.9-1.25), migraines, CVA, malnutrition, former tobacco abuse/quit 1 year ago, chronic urinary retention/not self cathing for over a year, smokes marijuana occasionally for back pain control and appetite stimulator, and recent distal left clavicle fracture 9/1/23 who presented to [facility] ED on 9/5 for nausea, vomiting, and diarrhea for 2 days prior to arrival.  On arrival, she had 3 episodes of diarrhea in ED with decrease oral intake.  She denied fever, chills, shortness of breath, or dysuria.  She denies any hematemesis, hematochezia, or melena.  She had blood on toilet paper and has h/o hemorrhoids.  She has had fevers up to 102.5 last night.  Labs were WBC 7300 with 84% neutrophils, PCT 0.17, creatinine 1.22, A1C 11.2, and UA WBC 31-50 with moderate LE and negative nitrite.  Urine culture is positive for E.coli (pan sens).  Blood cultures are positive in 2 of 2 sets (2 of 4 bottles) with CoNS.  A GI panel PCR was positive for Enteroaggregative E.coli.  A respiratory panel PCR is negative. A CXR on 9/5 showed no acute findings. A CT scan of a/p without contrast on 9/6 showed no acute findings.  She was on oral Vancomycin from 9/5 to 9/6.  She was given a dose of Rocephin on 9/6 and a diose of IV Vancomycin on 9/7.  She is currently not on any antibiotic therapy.  ID was asked to evaluate.      Of note: she has not  had a Cdiff done on this visit, but she had a Cdiff test on 8/11/23 with PCR positive and toxin EIA negative.  She states they did do a test on this visit and it was negative. She also has had positive EAEC multiple times on 3/22/22, 3/26/22,12/10/22, 8/11/23, and now 9/7/23.  It appears that she has chronic issues with gastroparesis flares and diarrhea.  Her EAEC has never been treated because of her prolonged QT with Cipro and Azithromycin contraindicated.     9/10/23: Tmax 103.1. Fevers improved this morning. She states she had a watery stool early this morning.  Denies shortness of breath, vomiting, rashes.  Still with abdominal tenderness and nausea.     9/11/23: Tmax 101 today.  She continues to not feel well.  She had 1 watery stool this a.m. She denies n/v, shortness of breath, rashes, or dysuria.  She complains of mid abdominal pain.  She has no appetite but was will to have an Ensure. WBC worse at 13.98.  Creatinine improved to 1.19 with CrCl of 45.      Past Medical History:   Diagnosis Date    Acid reflux     Acute bronchitis     Cardiac murmur     Depression with anxiety 10/5/2020    Diabetes mellitus     Gastroesophageal reflux disease 5/13/2016    H/O echocardiogram 08/07/2012    i. LVEF 65%.ii. Mild LVH.iii. Borderline evidence of atrial septal aneurysm.  No PFO.     History of nuclear stress test 08/22/2014    Negative for ischemia and scars; LVEF 77%.      History of TIAs 7/15/2021    Hyperlipidemia     Hypertension     Impacted cerumen of both ears     Migraine     Migraines 10/31/2019    Self-catheterizes urinary bladder     Sinusitis     Stroke     Tobacco abuse     quit 4 days ago.      Urticaria     Vitamin D deficiency 5/13/2016       Past Surgical History:   Procedure Laterality Date    CAPSULE ENDOSCOPY  07/27/2021    Procedure: PILLCAM DEPLOYMENT;  Surgeon: Mikael Worthy MD;  Location: [facility] END[facility]COPY;  Service: Gastroenterology;;    COLONOSCOPY      COLONOSCOPY N/A 07/27/2021     Procedure: COLONOSCOPY;  Surgeon: Mikael Worthy MD;  Location:  [facility];  Service: Gastroenterology;  Laterality: N/A;    DENTAL PROCEDURE      ENDOSCOPY N/A 2021    Procedure: ESOPHAGOGASTRODUODENOSCOPY;  Surgeon: Brunner, Mark I, MD;  Location:  [facility];  Service: Gastroenterology;  Laterality: N/A;    ENDOSCOPY N/A 2021    Procedure: ESOPHAGOGASTRODUODENOSCOPY;  Surgeon: Mikael Worthy MD;  Location:  [facility];  Service: Gastroenterology;  Laterality: N/A;    ENDOSCOPY WITH JTUBE N/A 2022    Procedure: ESOPHAGOGASTRODUODENOSCOPY WITH JEJUNAL TUBE INSERTION;  Surgeon: Thom Glover MD;  Location:  [facility];  Service: Gastroenterology;  Laterality: N/A;    UPPER GASTROINTESTINAL ENDOSCOPY         Family History   Problem Relation Age of Onset    Anxiety disorder Other     Arthritis Other     ADD / ADHD Other     Heart disease Other         cardiac disorder    Depression Other     Diabetes Other     Hyperlipidemia Other     Hypertension Other     Lung cancer Other     Osteoporosis Other     Hypertension Brother     Diabetes Brother     Hyperlipidemia Mother     Hypertension Mother     Colon cancer Neg Hx        Social History     Socioeconomic History    Marital status:    Tobacco Use    Smoking status: Former     Packs/day: 0.25     Years: 30.00     Pack years: 7.50     Types: Cigarettes     Quit date: 2019     Years since quittin.2    Smokeless tobacco: Never    Tobacco comments:     BC PL never smoker    Vaping Use    Vaping Use: Never used   Substance and Sexual Activity    Alcohol use: Yes     Alcohol/week: 1.0 standard drink     Types: 1 Glasses of wine per week     Comment: ocassional    Drug use: Yes     Frequency: 2.0 times per week     Types: Marijuana    Sexual activity: Not Currently     Comment: Single        No Known Allergies      Medication:    Current Facility-Administered Medications:     acetaminophen (TYLENOL) tablet  650 mg, 650 mg, Oral, Q6H PRN, HARI Jimenez DO, 650 mg at 09/11/23 0920    amLODIPine (NORVASC) tablet 10 mg, 10 mg, Oral, Q24H, Magali Delvalle APRN, 10 mg at 09/11/23 0905    carvedilol (COREG) tablet 12.5 mg, 12.5 mg, Oral, BID With Meals, Susan Cohen, APRN, 12.5 mg at 09/11/23 0905    cefTRIAXone (ROCEPHIN) 2000 mg/100 mL 0.9% NS IVPB (MBP), 2,000 mg, Intravenous, Q24H, Rene García PA, Last Rate: 0 mL/hr at 09/09/23 1524, 2,000 mg at 09/11/23 1133    dextrose (D50W) (25 g/50 mL) IV injection 25 g, 25 g, Intravenous, Q15 Min PRN, Magali Delvalle APRN    dextrose (GLUTOSE) oral gel 15 g, 15 g, Oral, Q15 Min PRN, Magali Delvalle APRN    famotidine (PEPCID) tablet 20 mg, 20 mg, Oral, Vu HUMPHREY Stacy D, PharmD, 20 mg at 09/11/23 0906    ferrous sulfate tablet 325 mg, 325 mg, Oral, Daily With Breakfast, Magali Delvalle APRN, 325 mg at 09/11/23 0906    glucagon (GLUCAGEN) injection 1 mg, 1 mg, Intramuscular, Q15 Min PRN, Magali Delvalle APRN    hydrALAZINE (APRESOLINE) injection 10 mg, 10 mg, Intravenous, Q6H PRN, HARI Jimenez DO, 10 mg at 09/08/23 1023    hydrALAZINE (APRESOLINE) tablet 50 mg, 50 mg, Oral, Q8H, HARI Jimenez DO, 50 mg at 09/11/23 0440    ibuprofen (ADVIL,MOTRIN) tablet 400 mg, 400 mg, Oral, Q6H, HARI Jimenez DO, 400 mg at 09/11/23 1133    insulin detemir (LEVEMIR) injection 10 Units, 10 Units, Subcutaneous, Daily, HARI Jimenez DO, 10 Units at 09/11/23 0905    Insulin Lispro (humaLOG) injection 2-7 Units, 2-7 Units, Subcutaneous, 4x Daily AC & at Bedtime, Magali Delvalle APRN, 2 Units at 09/10/23 2116    lactobacillus acidophilus (RISAQUAD) capsule 1 capsule, 1 capsule, Oral, Daily, Magali Delvalle APRN, 1 capsule at 09/11/23 0905    Lidocaine 4 % 1 patch, 1 patch, Transdermal, Q24H, Faith Nelson MD, 1 patch at 09/10/23 1010    melatonin tablet 5 mg, 5 mg, Oral, Nightly PRN, Magali Delvalle APRN, 5 mg at 09/10/23 2116    mirtazapine (REMERON) tablet 7.5 mg, 7.5 mg, Oral,  Nightly, Martin Martinez III, DO, 7.5 mg at 09/10/23 2116    multivitamin (THERAGRAN) tablet 1 tablet, 1 tablet, Oral, Daily, Magali Delvalle APRN, 1 tablet at 09/11/23 0906    ondansetron (ZOFRAN) tablet 4 mg, 4 mg, Oral, Q6H PRN, 4 mg at 09/06/23 2105 **OR** ondansetron (ZOFRAN) injection 4 mg, 4 mg, Intravenous, Q6H PRN, Magali Delvalle APRN, 4 mg at 09/07/23 2227    oxyCODONE-acetaminophen (PERCOCET) 5-325 MG per tablet 1 tablet, 1 tablet, Oral, Q6H PRN, Faith Nelson MD, 1 tablet at 09/10/23 2116    Potassium Replacement - Follow Nurse / BPA Driven Protocol, , Does not apply, PRN, Susan Cohen APRN    prochlorperazine (COMPAZINE) injection 5 mg, 5 mg, Intravenous, Q6H PRN, 5 mg at 09/07/23 1120 **OR** prochlorperazine (COMPAZINE) tablet 5 mg, 5 mg, Oral, Q6H PRN, 5 mg at 09/07/23 2057 **OR** prochlorperazine (COMPAZINE) suppository 25 mg, 25 mg, Rectal, Q12H PRN, HARI Jimenez,     rivaroxaban (XARELTO) tablet 15 mg, 15 mg, Oral, Daily With Dinner, Magali Delvalle APRN, 15 mg at 09/10/23 1702    [COMPLETED] Insert Peripheral IV, , , Once **AND** sodium chloride 0.9 % flush 10 mL, 10 mL, Intravenous, PRN, Nas Patel MD    sodium chloride 0.9 % flush 10 mL, 10 mL, Intravenous, Q12H, Magali Delvalle APRN, 10 mL at 09/11/23 0907    sodium chloride 0.9 % flush 10 mL, 10 mL, Intravenous, PRN, Magali Delvalle APRN    sodium chloride 0.9 % infusion 40 mL, 40 mL, Intravenous, PRN, Magali Delvalle APRN    terazosin (HYTRIN) capsule 1 mg, 1 mg, Oral, Nightly, Magali Delvalle APRN, 1 mg at 09/10/23 2116    vancomycin (VANCOCIN) capsule 125 mg, 125 mg, Oral, BID, Rene García PA, 125 mg at 09/11/23 0906    Antibiotics:  Anti-Infectives (From admission, onward)      Ordered     Dose/Rate Route Frequency Start Stop    09/09/23 1856  DAPTOmycin (CUBICIN) 350 mg in sodium chloride 0.9 % 50 mL IVPB        Ordering Provider: Pedro Tucker MD    6 mg/kg × 62.3 kg (Adjusted)  100 mL/hr over 30  Minutes Intravenous Once 23 1945 23 1119  vancomycin (VANCOCIN) capsule 125 mg        Ordering Provider: Rene García PA    125 mg Oral 2 Times Daily 23 1300 23 0859    23 1119  cefTRIAXone (ROCEPHIN) 2000 mg/100 mL 0.9% NS IVPB (MBP)        Ordering Provider: Rene García PA    2,000 mg  over 30 Minutes Intravenous Every 24 Hours 23 1200 23 1159    23 0331  vancomycin 1250 mg/250 mL 0.9% NS IVPB ([facility])        Ordering Provider: Vanessa Francisco APRN    20 mg/kg × 59.7 kg  over 75 Minutes Intravenous Once 23 0430 23 0652    23 1233  fosfomycin (MONUROL) packet 3 g        Ordering Provider: Faith Nelson MD    3 g Oral Once 23 1330 23 1332              Review of Systems:  See above.       Physical Exam:   Vital Signs  Temp (24hrs), Av.3 °F (37.9 °C), Min:99.3 °F (37.4 °C), Max:101.3 °F (38.5 °C)    Temp  Min: 99.3 °F (37.4 °C)  Max: 101.3 °F (38.5 °C)  BP  Min: 131/62  Max: 158/74  Pulse  Min: 75  Max: 92  Resp  Min: 16  Max: 16  SpO2  Min: 96 %  Max: 99 %    GENERAL: Awake and alert, in no acute distress.   HEENT: Normocephalic, atraumatic.  No conjunctival injection. No icterus. Oropharynx clear without evidence of thrush or exudate.   NECK: Supple without nuchal rigidity. No mass.  LYMPH: No cervical, axillary or inguinal lymphadenopathy.  HEART: RRR; No murmur, rubs, gallops.   LUNGS: Clear to auscultation bilaterally without wheezing, rales, rhonchi. Normal respiratory effort. Nonlabored.   ABDOMEN: Soft, mild mid abdominal tenderness, nondistended. Positive bowel sounds. No rebound or guarding.  EXT:  No cyanosis, clubbing or edema. No cord.  :  Without Ansari catheter.  MSK: No joint effusions or erythema  SKIN: Warm and dry without cutaneous eruptions on Inspection/palpation.    NEURO: Oriented to PPT.  Motor 5/5 strength  PSYCHIATRIC: Normal insight and judgment. Cooperative with PE    Laboratory  Data    Results from last 7 days   Lab Units 09/11/23  0412 09/10/23  0343 09/08/23  1626   WBC 10*3/mm3 13.98* 6.77 6.47   HEMOGLOBIN g/dL 11.6* 10.4* 11.6*   HEMATOCRIT % 35.1 32.4* 36.3   PLATELETS 10*3/mm3 417 306 337       Results from last 7 days   Lab Units 09/11/23  0412   SODIUM mmol/L 143   POTASSIUM mmol/L 4.1   CHLORIDE mmol/L 112*   CO2 mmol/L 22.0   BUN mg/dL 15   CREATININE mg/dL 1.19*   GLUCOSE mg/dL 93   CALCIUM mg/dL 8.8       Results from last 7 days   Lab Units 09/11/23  0412   ALK PHOS U/L 79   BILIRUBIN mg/dL 0.3   ALT (SGPT) U/L 7   AST (SGOT) U/L 13               Results from last 7 days   Lab Units 09/09/23  1747   LACTATE mmol/L 1.0       Results from last 7 days   Lab Units 09/10/23  0343   CK TOTAL U/L 45       Results from last 7 days   Lab Units 09/08/23  0511   VANCOMYCIN RM mcg/mL 8.30       Estimated Creatinine Clearance: 44.7 mL/min (A) (by C-G formula based on SCr of 1.19 mg/dL (H)).      Microbiology:  Microbiology Results (last 10 days)       Procedure Component Value - Date/Time    Blood Culture - Blood, Hand, Right [[id number]]  (Normal) Collected: 09/09/23 1751    Lab Status: Preliminary result Specimen: Blood from Hand, Right Updated: 09/10/23 1845     Blood Culture No growth at 24 hours    Blood Culture - Blood, Hand, Left [[id number]]  (Normal) Collected: 09/09/23 1747    Lab Status: Preliminary result Specimen: Blood from Hand, Left Updated: 09/10/23 1845     Blood Culture No growth at 24 hours    Respiratory Panel PCR w/COVID-19(SARS-CoV-2) [facility] In-House, NP Swab in [facility], 3-4 HR TAT - Swab, Nasopharynx [[id number]]  (Normal) Collected: 09/08/23 1714    Lab Status: Final result Specimen: Swab from Nasopharynx Updated: 09/08/23 1818     ADENOVIRUS, PCR Not Detected     Coronavirus 229E Not Detected     Coronavirus HKU1 Not Detected     Coronavirus NL63 Not Detected     Coronavirus OC43 Not Detected     COVID19 Not Detected     Human Metapneumovirus  Not Detected     Human Rhinovirus/Enterovirus Not Detected     Influenza A PCR Not Detected     Influenza B PCR Not Detected     Parainfluenza Virus 1 Not Detected     Parainfluenza Virus 2 Not Detected     Parainfluenza Virus 3 Not Detected     Parainfluenza Virus 4 Not Detected     RSV, PCR Not Detected     Bordetella pertussis pcr Not Detected     Bordetella parapertussis PCR Not Detected     Chlamydophila pneumoniae PCR Not Detected     Mycoplasma pneumo by PCR Not Detected    Narrative:      In the setting of a positive respiratory panel with a viral infection PLUS a negative procalcitonin without other underlying concern for bacterial infection, consider observing off antibiotics or discontinuation of antibiotics and continue supportive care. If the respiratory panel is positive for atypical bacterial infection (Bordetella pertussis, Chlamydophila pneumoniae, or Mycoplasma pneumoniae), consider antibiotic de-escalation to target atypical bacterial infection.    Gastrointestinal Panel, PCR - Stool, Per Rectum [[id number]]  (Abnormal) Collected: 09/07/23 1018    Lab Status: Final result Specimen: Stool from Per Rectum Updated: 09/07/23 1234     Campylobacter Not Detected     Plesiomonas shigelloides Not Detected     Salmonella Not Detected     Vibrio Not Detected     Vibrio cholerae Not Detected     Yersinia enterocolitica Not Detected     Enteroaggregative E. coli (EAEC) Detected     Enteropathogenic E. coli (EPEC) Not Detected     Enterotoxigenic E. coli (ETEC) lt/st Not Detected     Shiga-like toxin-producing E. coli (STEC) stx1/stx2 Not Detected     Shigella/Enteroinvasive E. coli (EIEC) Not Detected     Cryptosporidium Not Detected     Cyclospora cayetanensis Not Detected     Entamoeba histolytica Not Detected     Giardia lamblia Not Detected     Adenovirus F40/41 Not Detected     Astrovirus Not Detected     Norovirus GI/GII Not Detected     Rotavirus A Not Detected     Sapovirus (I, II, IV or V) Not  Detected    Blood Culture - Blood, Hand, Left [[id number]]  (Normal) Collected: 09/07/23 0752    Lab Status: Preliminary result Specimen: Blood from Hand, Left Updated: 09/11/23 0815     Blood Culture No growth at 4 days    Narrative:      Aerobic bottle only      Blood Culture - Blood, Hand, Right [[id number]]  (Normal) Collected: 09/07/23 0750    Lab Status: Preliminary result Specimen: Blood from Hand, Right Updated: 09/11/23 0815     Blood Culture No growth at 4 days    Respiratory Panel PCR w/COVID-19(SARS-CoV-2) [facility] In-House, NP Swab in UTM/VTM, 3-4 HR TAT - Swab, Nasopharynx [[id number]]  (Normal) Collected: 09/06/23 0620    Lab Status: Final result Specimen: Swab from Nasopharynx Updated: 09/06/23 0744     ADENOVIRUS, PCR Not Detected     Coronavirus 229E Not Detected     Coronavirus HKU1 Not Detected     Coronavirus NL63 Not Detected     Coronavirus OC43 Not Detected     COVID19 Not Detected     Human Metapneumovirus Not Detected     Human Rhinovirus/Enterovirus Not Detected     Influenza A PCR Not Detected     Influenza B PCR Not Detected     Parainfluenza Virus 1 Not Detected     Parainfluenza Virus 2 Not Detected     Parainfluenza Virus 3 Not Detected     Parainfluenza Virus 4 Not Detected     RSV, PCR Not Detected     Bordetella pertussis pcr Not Detected     Bordetella parapertussis PCR Not Detected     Chlamydophila pneumoniae PCR Not Detected     Mycoplasma pneumo by PCR Not Detected    Narrative:      In the setting of a positive respiratory panel with a viral infection PLUS a negative procalcitonin without other underlying concern for bacterial infection, consider observing off antibiotics or discontinuation of antibiotics and continue supportive care. If the respiratory panel is positive for atypical bacterial infection (Bordetella pertussis, Chlamydophila pneumoniae, or Mycoplasma pneumoniae), consider antibiotic de-escalation to target atypical bacterial infection.    Blood  Culture - Blood, Arm, Left [[id number]]  (Abnormal) Collected: 09/06/23 0600    Lab Status: Final result Specimen: Blood from Arm, Left Updated: 09/10/23 0606     Blood Culture Staphylococcus warneri     Isolated from Aerobic Bottle     Gram Stain Aerobic Bottle Gram positive cocci in groups    Narrative:      Probable contaminant requires clinical correlation, susceptibility not performed unless requested by physician.        Blood Culture ID, PCR - Blood, Arm, Left [[id number]]  (Abnormal) Collected: 09/06/23 0600    Lab Status: Final result Specimen: Blood from Arm, Left Updated: 09/07/23 0133     BCID, PCR Staph spp, not aureus or lugdunensis. Identification by BCID2 PCR.     BOTTLE TYPE Aerobic Bottle    Blood Culture - Blood, Arm, Left [[id number]]  (Abnormal) Collected: 09/06/23 0550    Lab Status: Final result Specimen: Blood from Arm, Left Updated: 09/10/23 0606     Blood Culture Staphylococcus hominis ssp hominis     Isolated from Aerobic Bottle     Gram Stain Aerobic Bottle Gram positive cocci in groups    Narrative:      Probable contaminant requires clinical correlation, susceptibility not performed unless requested by physician.        Blood Culture ID, PCR - Blood, Arm, Left [[id number]]  (Abnormal) Collected: 09/06/23 0550    Lab Status: Final result Specimen: Blood from Arm, Left Updated: 09/07/23 0324     BCID, PCR Staph spp, not aureus or lugdunensis. Identification by BCID2 PCR.     BOTTLE TYPE Aerobic Bottle    Urine Culture - Urine, Urine, Clean Catch [[id number]]  (Abnormal)  (Susceptibility) Collected: 09/05/23 2309    Lab Status: Final result Specimen: Urine, Clean Catch Updated: 09/08/23 0112     Urine Culture >100,000 CFU/mL Escherichia coli    Narrative:      Colonization of the urinary tract without infection is common. Treatment is discouraged unless the patient is symptomatic, pregnant, or undergoing an invasive urologic procedure.    Susceptibility        Escherichia coli      AMELIA       Ampicillin Susceptible      Ampicillin + Sulbactam Susceptible      Cefazolin Susceptible      Cefepime Susceptible      Ceftazidime Susceptible      Ceftriaxone Susceptible      Gentamicin Susceptible      Levofloxacin Susceptible      Nitrofurantoin Susceptible      Piperacillin + Tazobactam Susceptible      Trimethoprim + Sulfamethoxazole Susceptible                                         Radiology:    9/6/23 chest x-ray:  Findings:  There are no airspace consolidations. No pleural fluid. No pneumothorax. The pulmonary vasculature appears within normal limits. The cardiac and mediastinal silhouette appear unremarkable. No acute osseous abnormality identified.   Impression:     Impression:  No acute cardiopulmonary process.       9/6/23 CT abdomen and pelvis without contrast:  Impression:     1. No acute intra-abdominal or intrapelvic process. Circumferential bladder wall thickening is present likely related to chronic cystitis or outlet obstruction, similar as compared to previous studies.   2. Ancillary findings as described above..       Impression:   SIRS criteria with hectic fevers and tachycardia- Unclear etiology but I suspect this is due to a urinary source.  She did recently have positive blood cultures but repeat blood cultures were no growth.  Unclear if her coagulase-negative staph was a contaminant versus true bacteremia. No signs of pneumonia clinically or on imaging. She did have recent C. Difficile colonization but not currently having any diarrhea. No acute intra-abdominal process on CT abdomen and pelvis recently. Note that her CT abdomen and pelvis was without contrast. She did have bladder wall thickening consistent with cystitis. There is concern for intra-abdominal source for her fevers and worsening leukocytosis, so we would like to get CT scan of a/p with IV contrast and broaden her antibiotic to Zosyn for more intraabdominal coverage.   E.coli UTI.  Patient has not self cathed in over a  year as she urinates on her own now.   Coagulase negative Staph blood cultures contamination. Blood cultures from 9/6 were collected from the same arm but appeared to have been collected at slightly different times--one set with Staph warneri and other set with Staph hominis. This represents contaminated blood cultures.  She did receive a dose of Daptomycin on 9/9 while awaiting these cultures.  But repeat blood cultures are negative to date. No need to further treat with Daptomycin.   Chronic recurrent Enteroaggregative E.coli positive diarrhea.  Usually self limiting, but likely colonizer.  Acute renal failure/CKD Stage IIIa, improved  H/o Cdiff 3/2022, also on 8/11, PCR was positive but toxin negative indicating colonization.  Type 1 diabetes mellitus, uncontrolled  Chronic Atrial fibrillation/Xarelto  Iron-deficiency anemia  Gastroparesis/gastric stimulator with acute flares.  H/o cerebrovascular accident/TIAs  Ongoing tobacco abuse, just quit about a week ago.  Recurrent prolonged QT, avoid antimicrobials that may prolong QT such as Levaquin, Cipro, azithromycin, fluconazole.  Protein calorie malnutrition      PLAN/RECOMMENDATIONS:   Thank you for asking us to see Thelma Michael, I recommend the following:  - Continue to monitor CBC closely  - Vancomycin 125 mg PO BID for Cdiff prophylaxis  - Stop Rocephin and change to Zosyn 3.375 GM IV Q8H for more IA coverage.   - Blood cultures x 2 on 9/7 and 9/9 are NGTD.    - Consider CT scan of a/p with IV contrast to r/o any IA abscesses.   - D/w nursing to give patient Ensure if she can tolerate.     Patient continues to have hectic fevers, worsening leukocytosis, and occasional diarrhea.  She appears very frail and ill.     Pedro Tucker MD saw and examined patient, verified hx and PE, read all radiographic studies, reviewed labs and micro data, and formulated dx, plan for treatment and all medical decision making.      Rene García PA-C for Pedro Tucker,  JAMESON Hernandez  9/11/2023  11:35 EDT                    Electronically signed by Rene García PA at 09/11/23 1204       Pedro Tucker MD at 09/10/23 1127              INFECTIOUS DISEASE PROGRESS NOTE    Thelma Michael  1960  [id number]    Date of Consult: 9/9/2023    Admission Date: 9/5/2023      Requesting Provider: Skip Jimenez DO  Evaluating Physician: Pedro Tucker MD    Reason for Consultation: febrile, recent diarrhea/E. coli infection, ? Contaminated blood cultures    History of present illness:    Patient is a 62 y.o. female with h/o T1DM/poorly controlled, afib/Xarelto, anemia, gastroparesis/gastric stimulator, , GERD, TIAs, HLD, HTN, CKD Stage IIIa (baseline 0.9-1.25), migraines, CVA, malnutrition, former tobacco abuse/quit 1 year ago, chronic urinary retention/not self cathing for over a year, smokes marijuana occasionally for back pain control and appetite stimulator, and recent distal left clavicle fracture 9/1/23 who presented to [facility] ED on 9/5 for nausea, vomiting, and diarrhea for 2 days prior to arrival.  On arrival, she had 3 episodes of diarrhea in ED with decrease oral intake.  She denied fever, chills, shortness of breath, or dysuria.  She denies any hematemesis, hematochezia, or melena.  She had blood on toilet paper and has h/o hemorrhoids.  She has had fevers up to 102.5 last night.  Labs were WBC 7300 with 84% neutrophils, PCT 0.17, creatinine 1.22, A1C 11.2, and UA WBC 31-50 with moderate LE and negative nitrite.  Urine culture is positive for E.coli (pan sens).  Blood cultures are positive in 2 of 2 sets (2 of 4 bottles) with CoNS.  A GI panel PCR was positive for Enteroaggregative E.coli.  A respiratory panel PCR is negative. A CXR on 9/5 showed no acute findings. A CT scan of a/p without contrast on 9/6 showed no acute findings.  She was on oral Vancomycin from 9/5 to 9/6.  She was given a dose of Rocephin on 9/6 and a diose of IV Vancomycin on 9/7.  She  is currently not on any antibiotic therapy.  ID was asked to evaluate.      Of note: she has not had a Cdiff done on this visit, but she had a Cdiff test on 8/11/23 with PCR positive and toxin EIA negative.  She states they did do a test on this visit and it was negative. She also has had positive EAEC multiple times on 3/22/22, 3/26/22,12/10/22, 8/11/23, and now 9/7/23.  It appears that she has chronic issues with gastroparesis flares and diarrhea.  Her EAEC has never been treated because of her prolonged QT with Cipro and Azithromycin contraindicated.     9/10/23: Tmax 103.1. Fevers improved this morning. She states she had a watery stool early this morning.  Denies shortness of breath, vomiting, rashes.  Still with abdominal tenderness and nausea.     Past Medical History:   Diagnosis Date    Acid reflux     Acute bronchitis     Cardiac murmur     Depression with anxiety 10/5/2020    Diabetes mellitus     Gastroesophageal reflux disease 5/13/2016    H/O echocardiogram 08/07/2012    i. LVEF 65%.ii. Mild LVH.iii. Borderline evidence of atrial septal aneurysm.  No PFO.     History of nuclear stress test 08/22/2014    Negative for ischemia and scars; LVEF 77%.      History of TIAs 7/15/2021    Hyperlipidemia     Hypertension     Impacted cerumen of both ears     Migraine     Migraines 10/31/2019    Self-catheterizes urinary bladder     Sinusitis     Stroke     Tobacco abuse     quit 4 days ago.      Urticaria     Vitamin D deficiency 5/13/2016       Past Surgical History:   Procedure Laterality Date    CAPSULE ENDOSCOPY  07/27/2021    Procedure: PILLCAM DEPLOYMENT;  Surgeon: Mikael Worthy MD;  Location:  [facility];  Service: Gastroenterology;;    COLONOSCOPY      COLONOSCOPY N/A 07/27/2021    Procedure: COLONOSCOPY;  Surgeon: Mikael Worthy MD;  Location:  [facility];  Service: Gastroenterology;  Laterality: N/A;    DENTAL PROCEDURE      ENDOSCOPY N/A 06/30/2021    Procedure:  ESOPHAGOGASTRODUODENOSCOPY;  Surgeon: Brunner, Mark I, MD;  Location:  [facility];  Service: Gastroenterology;  Laterality: N/A;    ENDOSCOPY N/A 2021    Procedure: ESOPHAGOGASTRODUODENOSCOPY;  Surgeon: Mikael Worthy MD;  Location:  [facility];  Service: Gastroenterology;  Laterality: N/A;    ENDOSCOPY WITH JTUBE N/A 2022    Procedure: ESOPHAGOGASTRODUODENOSCOPY WITH JEJUNAL TUBE INSERTION;  Surgeon: Thom Glover MD;  Location:  [facility];  Service: Gastroenterology;  Laterality: N/A;    UPPER GASTROINTESTINAL ENDOSCOPY         Family History   Problem Relation Age of Onset    Anxiety disorder Other     Arthritis Other     ADD / ADHD Other     Heart disease Other         cardiac disorder    Depression Other     Diabetes Other     Hyperlipidemia Other     Hypertension Other     Lung cancer Other     Osteoporosis Other     Hypertension Brother     Diabetes Brother     Hyperlipidemia Mother     Hypertension Mother     Colon cancer Neg Hx        Social History     Socioeconomic History    Marital status:    Tobacco Use    Smoking status: Former     Packs/day: 0.25     Years: 30.00     Pack years: 7.50     Types: Cigarettes     Quit date: 2019     Years since quittin.2    Smokeless tobacco: Never    Tobacco comments:     BC PL never smoker    Vaping Use    Vaping Use: Never used   Substance and Sexual Activity    Alcohol use: Yes     Alcohol/week: 1.0 standard drink     Types: 1 Glasses of wine per week     Comment: ocassional    Drug use: Yes     Frequency: 2.0 times per week     Types: Marijuana    Sexual activity: Not Currently     Comment: Single        No Known Allergies      Medication:    Current Facility-Administered Medications:     acetaminophen (TYLENOL) tablet 650 mg, 650 mg, Oral, Q6H PRN, HARI Jimenez, , 650 mg at 09/10/23 1322    amLODIPine (NORVASC) tablet 10 mg, 10 mg, Oral, Q24H, Magali Delvalle APRN, 10 mg at 09/10/23 1006    carvedilol  (COREG) tablet 12.5 mg, 12.5 mg, Oral, BID With Meals, Susan Cohen, APRN, 12.5 mg at 09/10/23 1702    cefTRIAXone (ROCEPHIN) 2000 mg/100 mL 0.9% NS IVPB (MBP), 2,000 mg, Intravenous, Q24H, Rene García PA, Last Rate: 0 mL/hr at 09/09/23 1524, 2,000 mg at 09/10/23 1144    dextrose (D50W) (25 g/50 mL) IV injection 25 g, 25 g, Intravenous, Q15 Min PRN, Magali Delvalle APRN    dextrose (GLUTOSE) oral gel 15 g, 15 g, Oral, Q15 Min PRN, Magali Delvalle APRN    famotidine (PEPCID) injection 20 mg, 20 mg, Intravenous, Daily, Faith Nelson MD, 20 mg at 09/10/23 1009    ferrous sulfate tablet 325 mg, 325 mg, Oral, Daily With Breakfast, Magali Delvalle APRN, 325 mg at 09/10/23 1004    glucagon (GLUCAGEN) injection 1 mg, 1 mg, Intramuscular, Q15 Min PRN, Magali Delvalle APRN    hydrALAZINE (APRESOLINE) injection 10 mg, 10 mg, Intravenous, Q6H PRN, HARI Jimenez DO, 10 mg at 09/08/23 1023    hydrALAZINE (APRESOLINE) tablet 50 mg, 50 mg, Oral, Q8H, HARI Jimenez DO, 50 mg at 09/10/23 1317    ibuprofen (ADVIL,MOTRIN) tablet 400 mg, 400 mg, Oral, Q6H, HARI Jimenez DO, 400 mg at 09/10/23 1702    insulin detemir (LEVEMIR) injection 10 Units, 10 Units, Subcutaneous, Daily, HARI Jimenez DO, 10 Units at 09/10/23 1006    Insulin Lispro (humaLOG) injection 2-7 Units, 2-7 Units, Subcutaneous, 4x Daily AC & at Bedtime, Magali Delvalle APRN, 5 Units at 09/09/23 1702    lactobacillus acidophilus (RISAQUAD) capsule 1 capsule, 1 capsule, Oral, Daily, Magali Delvalle APRN, 1 capsule at 09/10/23 1005    Lidocaine 4 % 1 patch, 1 patch, Transdermal, Q24H, Faith Nelson MD, 1 patch at 09/10/23 1010    melatonin tablet 5 mg, 5 mg, Oral, Nightly PRN, Magali Delvalle APRN, 5 mg at 09/08/23 2053    mirtazapine (REMERON) tablet 7.5 mg, 7.5 mg, Oral, Nightly, Martin Martinez III, DO, 7.5 mg at 09/09/23 2021    multivitamin (THERAGRAN) tablet 1 tablet, 1 tablet, Oral, Daily, Magali Delvalle APRN, 1 tablet at 09/10/23 1006     ondansetron (ZOFRAN) tablet 4 mg, 4 mg, Oral, Q6H PRN, 4 mg at 09/06/23 2105 **OR** ondansetron (ZOFRAN) injection 4 mg, 4 mg, Intravenous, Q6H PRN, Magali Delvalle APRN, 4 mg at 09/07/23 2227    oxyCODONE-acetaminophen (PERCOCET) 5-325 MG per tablet 1 tablet, 1 tablet, Oral, Q6H PRN, Faith Nelson MD, 1 tablet at 09/09/23 1826    Potassium Replacement - Follow Nurse / BPA Driven Protocol, , Does not apply, PRN, Susan Cohen, HOWIE    prochlorperazine (COMPAZINE) injection 5 mg, 5 mg, Intravenous, Q6H PRN, 5 mg at 09/07/23 1120 **OR** prochlorperazine (COMPAZINE) tablet 5 mg, 5 mg, Oral, Q6H PRN, 5 mg at 09/07/23 2057 **OR** prochlorperazine (COMPAZINE) suppository 25 mg, 25 mg, Rectal, Q12H PRN, HARI Jimenez,     rivaroxaban (XARELTO) tablet 15 mg, 15 mg, Oral, Daily With Dinner, Magali Delvalle APRN, 15 mg at 09/10/23 1702    [COMPLETED] Insert Peripheral IV, , , Once **AND** sodium chloride 0.9 % flush 10 mL, 10 mL, Intravenous, PRN, Nas Patel MD    sodium chloride 0.9 % flush 10 mL, 10 mL, Intravenous, Q12H, Magali Delvalle APRN, 10 mL at 09/10/23 1007    sodium chloride 0.9 % flush 10 mL, 10 mL, Intravenous, PRN, Magali Delvalle APRN    sodium chloride 0.9 % infusion 40 mL, 40 mL, Intravenous, PRN, Magali Delvalle APRN    terazosin (HYTRIN) capsule 1 mg, 1 mg, Oral, Nightly, Magali Delvalle APRN, 1 mg at 09/09/23 2021    vancomycin (VANCOCIN) capsule 125 mg, 125 mg, Oral, BID, Rene García PA, 125 mg at 09/10/23 1006    Antibiotics:  Anti-Infectives (From admission, onward)      Ordered     Dose/Rate Route Frequency Start Stop    09/09/23 1856  DAPTOmycin (CUBICIN) 350 mg in sodium chloride 0.9 % 50 mL IVPB        Ordering Provider: Pedro Tucker MD    6 mg/kg × 62.3 kg (Adjusted)  100 mL/hr over 30 Minutes Intravenous Once 09/09/23 1945 09/09/23 2051 09/09/23 1119  vancomycin (VANCOCIN) capsule 125 mg        Ordering Provider: Rene García PA    125 mg Oral 2 Times Daily  23 1300 23 0859    23 1119  cefTRIAXone (ROCEPHIN) 2000 mg/100 mL 0.9% NS IVPB (MBP)        Ordering Provider: Rene García PA    2,000 mg  over 30 Minutes Intravenous Every 24 Hours 23 1200 23 1159    23 0331  vancomycin 1250 mg/250 mL 0.9% NS IVPB ([facility])        Ordering Provider: Vanessa Francisco APRN    20 mg/kg × 59.7 kg  over 75 Minutes Intravenous Once 23 0430 23 0652    23 1233  fosfomycin (MONUROL) packet 3 g        Ordering Provider: Faith Nelson MD    3 g Oral Once 23 1330 23 1332              Review of Systems:  See above.       Physical Exam:   Vital Signs  Temp (24hrs), Av.1 °F (37.3 °C), Min:97.6 °F (36.4 °C), Max:100.5 °F (38.1 °C)    Temp  Min: 97.6 °F (36.4 °C)  Max: 100.5 °F (38.1 °C)  BP  Min: 103/49  Max: 158/75  Pulse  Min: 64  Max: 93  Resp  Min: 16  Max: 16  SpO2  Min: 96 %  Max: 100 %    GENERAL: Awake and alert, in no acute distress.   HEENT: Normocephalic, atraumatic.  No conjunctival injection. No icterus. Oropharynx clear without evidence of thrush or exudate.   NECK: Supple without nuchal rigidity. No mass.  LYMPH: No cervical, axillary or inguinal lymphadenopathy.  HEART: RRR; No murmur, rubs, gallops.   LUNGS: Clear to auscultation bilaterally without wheezing, rales, rhonchi. Normal respiratory effort. Nonlabored.   ABDOMEN: Soft, mild tenderness, nondistended. Positive bowel sounds. No rebound or guarding.  EXT:  No cyanosis, clubbing or edema. No cord.  :  Without Ansari catheter.  MSK: No joint effusions or erythema  SKIN: Warm and dry without cutaneous eruptions on Inspection/palpation.    NEURO: Oriented to PPT.  Motor 5/5 strength  PSYCHIATRIC: Normal insight and judgment. Cooperative with PE    Laboratory Data    Results from last 7 days   Lab Units 09/10/23  0343 23  1626 23  0859   WBC 10*3/mm3 6.77 6.47 4.84   HEMOGLOBIN g/dL 10.4* 11.6* 11.5*   HEMATOCRIT % 32.4* 36.3 34.6   PLATELETS 10*3/mm3  306 337 349     Results from last 7 days   Lab Units 09/10/23  1545 09/10/23  0343   SODIUM mmol/L  --  141   POTASSIUM mmol/L 5.2 3.4*   CHLORIDE mmol/L  --  111*   CO2 mmol/L  --  22.0   BUN mg/dL  --  25*   CREATININE mg/dL  --  1.50*   GLUCOSE mg/dL  --  76   CALCIUM mg/dL  --  8.2*     Results from last 7 days   Lab Units 09/10/23  0343   ALK PHOS U/L 62   BILIRUBIN mg/dL 0.3   ALT (SGPT) U/L 7   AST (SGOT) U/L 11             Results from last 7 days   Lab Units 09/09/23  1747   LACTATE mmol/L 1.0     Results from last 7 days   Lab Units 09/10/23  0343   CK TOTAL U/L 45     Results from last 7 days   Lab Units 09/08/23  0511   VANCOMYCIN RM mcg/mL 8.30     Estimated Creatinine Clearance: 35.7 mL/min (A) (by C-G formula based on SCr of 1.5 mg/dL (H)).      Microbiology:  Microbiology Results (last 10 days)       Procedure Component Value - Date/Time    Blood Culture - Blood, Hand, Right [[id number]]  (Normal) Collected: 09/09/23 1751    Lab Status: Preliminary result Specimen: Blood from Hand, Right Updated: 09/10/23 1845     Blood Culture No growth at 24 hours    Blood Culture - Blood, Hand, Left [[id number]]  (Normal) Collected: 09/09/23 1747    Lab Status: Preliminary result Specimen: Blood from Hand, Left Updated: 09/10/23 1845     Blood Culture No growth at 24 hours    Respiratory Panel PCR w/COVID-19(SARS-CoV-2) [facility], NP Swab in UTM/VTM, 3-4 HR TAT - Swab, Nasopharynx [[id number]]  (Normal) Collected: 09/08/23 1714    Lab Status: Final result Specimen: Swab from Nasopharynx Updated: 09/08/23 1818     ADENOVIRUS, PCR Not Detected     Coronavirus 229E Not Detected     Coronavirus HKU1 Not Detected     Coronavirus NL63 Not Detected     Coronavirus OC43 Not Detected     COVID19 Not Detected     Human Metapneumovirus Not Detected     Human Rhinovirus/Enterovirus Not Detected     Influenza A PCR Not Detected     Influenza B PCR Not Detected     Parainfluenza Virus 1 Not Detected      Parainfluenza Virus 2 Not Detected     Parainfluenza Virus 3 Not Detected     Parainfluenza Virus 4 Not Detected     RSV, PCR Not Detected     Bordetella pertussis pcr Not Detected     Bordetella parapertussis PCR Not Detected     Chlamydophila pneumoniae PCR Not Detected     Mycoplasma pneumo by PCR Not Detected    Narrative:      In the setting of a positive respiratory panel with a viral infection PLUS a negative procalcitonin without other underlying concern for bacterial infection, consider observing off antibiotics or discontinuation of antibiotics and continue supportive care. If the respiratory panel is positive for atypical bacterial infection (Bordetella pertussis, Chlamydophila pneumoniae, or Mycoplasma pneumoniae), consider antibiotic de-escalation to target atypical bacterial infection.    Gastrointestinal Panel, PCR - Stool, Per Rectum [[id number]]  (Abnormal) Collected: 09/07/23 1018    Lab Status: Final result Specimen: Stool from Per Rectum Updated: 09/07/23 1234     Campylobacter Not Detected     Plesiomonas shigelloides Not Detected     Salmonella Not Detected     Vibrio Not Detected     Vibrio cholerae Not Detected     Yersinia enterocolitica Not Detected     Enteroaggregative E. coli (EAEC) Detected     Enteropathogenic E. coli (EPEC) Not Detected     Enterotoxigenic E. coli (ETEC) lt/st Not Detected     Shiga-like toxin-producing E. coli (STEC) stx1/stx2 Not Detected     Shigella/Enteroinvasive E. coli (EIEC) Not Detected     Cryptosporidium Not Detected     Cyclospora cayetanensis Not Detected     Entamoeba histolytica Not Detected     Giardia lamblia Not Detected     Adenovirus F40/41 Not Detected     Astrovirus Not Detected     Norovirus GI/GII Not Detected     Rotavirus A Not Detected     Sapovirus (I, II, IV or V) Not Detected    Blood Culture - Blood, Hand, Left [[id number]]  (Normal) Collected: 09/07/23 0752    Lab Status: Preliminary result Specimen: Blood from Hand, Left Updated:  09/10/23 0815     Blood Culture No growth at 3 days    Narrative:      Aerobic bottle only      Blood Culture - Blood, Hand, Right [[id number]]  (Normal) Collected: 09/07/23 0750    Lab Status: Preliminary result Specimen: Blood from Hand, Right Updated: 09/10/23 0815     Blood Culture No growth at 3 days    Respiratory Panel PCR w/COVID-19(SARS-CoV-2) [facility], NP Swab in UTM/VTM, 3-4 HR TAT - Swab, Nasopharynx [[id number]]  (Normal) Collected: 09/06/23 0620    Lab Status: Final result Specimen: Swab from Nasopharynx Updated: 09/06/23 0744     ADENOVIRUS, PCR Not Detected     Coronavirus 229E Not Detected     Coronavirus HKU1 Not Detected     Coronavirus NL63 Not Detected     Coronavirus OC43 Not Detected     COVID19 Not Detected     Human Metapneumovirus Not Detected     Human Rhinovirus/Enterovirus Not Detected     Influenza A PCR Not Detected     Influenza B PCR Not Detected     Parainfluenza Virus 1 Not Detected     Parainfluenza Virus 2 Not Detected     Parainfluenza Virus 3 Not Detected     Parainfluenza Virus 4 Not Detected     RSV, PCR Not Detected     Bordetella pertussis pcr Not Detected     Bordetella parapertussis PCR Not Detected     Chlamydophila pneumoniae PCR Not Detected     Mycoplasma pneumo by PCR Not Detected    Narrative:      In the setting of a positive respiratory panel with a viral infection PLUS a negative procalcitonin without other underlying concern for bacterial infection, consider observing off antibiotics or discontinuation of antibiotics and continue supportive care. If the respiratory panel is positive for atypical bacterial infection (Bordetella pertussis, Chlamydophila pneumoniae, or Mycoplasma pneumoniae), consider antibiotic de-escalation to target atypical bacterial infection.    Blood Culture - Blood, Arm, Left [[id number]]  (Abnormal) Collected: 09/06/23 0600    Lab Status: Final result Specimen: Blood from Arm, Left Updated: 09/10/23 0606     Blood  Culture Staphylococcus warneri     Isolated from Aerobic Bottle     Gram Stain Aerobic Bottle Gram positive cocci in groups    Narrative:      Probable contaminant requires clinical correlation, susceptibility not performed unless requested by physician.        Blood Culture ID, PCR - Blood, Arm, Left [[id number]]  (Abnormal) Collected: 09/06/23 0600    Lab Status: Final result Specimen: Blood from Arm, Left Updated: 09/07/23 0133     BCID, PCR Staph spp, not aureus or lugdunensis. Identification by BCID2 PCR.     BOTTLE TYPE Aerobic Bottle    Blood Culture - Blood, Arm, Left [[id number]]  (Abnormal) Collected: 09/06/23 0550    Lab Status: Final result Specimen: Blood from Arm, Left Updated: 09/10/23 0606     Blood Culture Staphylococcus hominis ssp hominis     Isolated from Aerobic Bottle     Gram Stain Aerobic Bottle Gram positive cocci in groups    Narrative:      Probable contaminant requires clinical correlation, susceptibility not performed unless requested by physician.        Blood Culture ID, PCR - Blood, Arm, Left [[id number]]  (Abnormal) Collected: 09/06/23 0550    Lab Status: Final result Specimen: Blood from Arm, Left Updated: 09/07/23 0324     BCID, PCR Staph spp, not aureus or lugdunensis. Identification by BCID2 PCR.     BOTTLE TYPE Aerobic Bottle    Urine Culture - Urine, Urine, Clean Catch [[id number]]  (Abnormal)  (Susceptibility) Collected: 09/05/23 2309    Lab Status: Final result Specimen: Urine, Clean Catch Updated: 09/08/23 0112     Urine Culture >100,000 CFU/mL Escherichia coli    Narrative:      Colonization of the urinary tract without infection is common. Treatment is discouraged unless the patient is symptomatic, pregnant, or undergoing an invasive urologic procedure.    Susceptibility        Escherichia coli      AMELIA      Ampicillin Susceptible      Ampicillin + Sulbactam Susceptible      Cefazolin Susceptible      Cefepime Susceptible      Ceftazidime Susceptible      Ceftriaxone  Susceptible      Gentamicin Susceptible      Levofloxacin Susceptible      Nitrofurantoin Susceptible      Piperacillin + Tazobactam Susceptible      Trimethoprim + Sulfamethoxazole Susceptible                                         Radiology:    9/6/23 chest x-ray:  Findings:  There are no airspace consolidations. No pleural fluid. No pneumothorax. The pulmonary vasculature appears within normal limits. The cardiac and mediastinal silhouette appear unremarkable. No acute osseous abnormality identified.   Impression:     Impression:  No acute cardiopulmonary process.       9/6/23 CT abdomen and pelvis without contrast:  Impression:     1. No acute intra-abdominal or intrapelvic process. Circumferential bladder wall thickening is present likely related to chronic cystitis or outlet obstruction, similar as compared to previous studies.   2. Ancillary findings as described above..       Impression:   SIRS criteria with hectic fevers and tachycardia- Unclear etiology but I suspect this is due to a urinary source.  She did recently have positive blood cultures but repeat blood cultures were no growth.  Unclear if her coagulase-negative staph was a contaminant versus true bacteremia. No signs of pneumonia clinically or on imaging. She did have recent C. Difficile colonization but not currently having any diarrhea. No acute intra-abdominal process on CT abdomen and pelvis recently. Note that her CT abdomen and pelvis was without contrast. She did have bladder wall thickening consistent with cystitis.  E.coli UTI.  Patient has not self cathed in over a year as she urinates on her own now.   Coagulase negative Staph bacteremia vs contamination.  Repeat blood cultures negative to date. Blood cultures from 9/6 were collected from the same arm but appeared to have been collected at slightly different times--one set with Staph warneri and other set with Staph hominis. This represents a contaminated blood culture  Chronic  recurrent Enteroaggregative E.coli positive diarrhea.  Usually self limiting, but likely colonizer.  Acute renal failure/CKD Stage IIIa, improved  H/o Cdiff 3/2022, also on , PCR was positive but toxin negative indicating colonization.  Type 1 diabetes mellitus, uncontrolled  Chronic Atrial fibrillation/Xarelto  Iron-deficiency anemia  Gastroparesis/gastric stimulator with acute flares.  H/o cerebrovascular accident/TIAs  Ongoing tobacco abuse, just quit about a week ago.  Recurrent prolonged QT, avoid antimicrobials that may prolong QT such as Levaquin, Cipro, azithromycin, fluconazole.      PLAN/RECOMMENDATIONS:   Thank you for asking us to see Thelma Michael, I recommend the following:  - Continue to monitor CBC closely  - Vancomycin 125 mg PO BID for Cdiff prophylaxis  - Rocephin 2 GM IV daily for now.   - Status post a one-time dose of IV daptomycin given her coagulase-negative staph in blood cultures recently and her new fevers. I now suspect this is more likely a contaminant Given 2 different types of coag-negative staph on culture  - Blood cultures x 2 since she is having new fevers since her last set of blood cultures--pending.     Patient continues to have hectic fevers and occasional diarrhea.  She appears very frail and ill.     Pedro Tucker MD saw and examined patient, verified hx and PE, read all radiographic studies, reviewed labs and micro data, and formulated dx, plan for treatment and all medical decision making.      Rene García PA-C for MD Pedro Cobian MD  9/10/2023  20:52 EDT                    Electronically signed by Pedro Tucker MD at 09/10/23 2053       HARI Jimenez DO at 09/10/23 0950              [facility] Medicine Services  PROGRESS NOTE    Patient Name: Thelma Michael  : 1960  MRN: [id number]    Date of Admission: 2023  Primary Care Physician: Monet Howe, HOWIE    Subjective    Subjective     CC:  N/v    HPI:  Patient is a 62-year-old female seen and examined by me again this a.m., she is continued to have intermittent fevers yesterday evening however improved thus far this AM.  She continues to have some loose stools as well as some continued abdominal pain.  Infectious disease consulted and following at this time. Continued supportive care and treatment for recent UTI. No other acute changes overnight per patient. Patient now off cardene gtt, continue oral medications for treatment of hypertension.         ROS:  Gen- Some fever and chills, continued fatigue.   CV- No chest pain, palpitations  Resp- No cough, dyspnea  GI- Reports had some recurrent diarrhea overnight, some nausea and continued abdominal pain, no vomiting.       Objective   Objective     Vital Signs:   Temp:  [97.6 °F (36.4 °C)-103.1 °F (39.5 °C)] 100.5 °F (38.1 °C)  Heart Rate:  [64-94] 90  Resp:  [16] 16  BP: (103-158)/(49-75) 152/70     Physical Exam:  Constitutional: No acute distress, awake, alert, frail and acutely ill appearing   HENT: NCAT, nares patent, mucous membranes moist  Respiratory: Clear to auscultation bilaterally, respiratory effort normal   Cardiovascular: RRR, no murmurs, rubs, or gallops  Gastrointestinal: Positive bowel sounds, soft, minor tenderness to palpation, nondistended  Musculoskeletal: No bilateral ankle edema, no clubbing or cyanosis  Psychiatric: Appropriate affect, cooperative  Neurologic: Oriented x 3, strength symmetric in all extremities, Cranial Nerves grossly intact to confrontation, speech clear  Skin: No rashes     Results Reviewed:  LAB RESULTS:      Lab 09/10/23  0343 09/09/23  1747 09/08/23  1626 09/06/23  0859 09/06/23  0610 09/05/23  2320   WBC 6.77  --  6.47 4.84  --  7.29   HEMOGLOBIN 10.4*  --  11.6* 11.5*  --  13.0   HEMATOCRIT 32.4*  --  36.3 34.6  --  39.1   PLATELETS 306  --  337 349  --  404   NEUTROS ABS 3.68  --  3.88 2.73  --  6.14   IMMATURE GRANS (ABS) 0.04  --   0.02 0.05  --  0.02   LYMPHS ABS 2.27  --  1.89 1.23  --  0.83   MONOS ABS 0.73  --  0.67 0.82  --  0.29   EOS ABS 0.02  --  0.00 0.00  --  0.00   MCV 90.8  --  90.1 88.5  --  86.9   PROCALCITONIN  --   --  0.15  --   --  0.17   LACTATE  --  1.0 0.7  --  1.0  --          Lab 09/10/23  0343 09/08/23  1626 09/08/23  0511 09/07/23  1612 09/07/23  0436 09/06/23 2054 09/06/23  0901 09/06/23  0859   SODIUM 141 145 141  --  143  --  146*  --    POTASSIUM 3.4* 3.9 3.9 3.6 3.4*   < > 3.3*  --    CHLORIDE 111* 110* 108*  --  111*  --  110*  --    CO2 22.0 20.0* 19.0*  --  21.0*  --  21.0*  --    ANION GAP 8.0 15.0 14.0  --  11.0  --  15.0  --    BUN 25* 17 17  --  20  --  27*  --    CREATININE 1.50* 1.30* 1.27*  --  1.26*  --  1.61*  --    EGFR 38.8* 46.0* 47.3*  --  47.8*  --  35.6*  --    GLUCOSE 76 127* 124*  --  58*  --  162*  --    CALCIUM 8.2* 8.6 8.5*  --  8.3*  --  8.4*  --    MAGNESIUM 2.1 1.8  --   --  2.0  --   --   --    PHOSPHORUS  --   --   --   --  2.6  --   --   --    HEMOGLOBIN A1C  --   --   --   --   --   --   --  11.10*    < > = values in this interval not displayed.         Lab 09/10/23  0343 09/08/23  1626 09/05/23  2320   TOTAL PROTEIN 5.7* 6.5 8.2   ALBUMIN 2.7* 3.2* 4.0   GLOBULIN 3.0 3.3 4.2   ALT (SGPT) 7 10 13   AST (SGOT) 11 14 36*   BILIRUBIN 0.3 0.4 0.5   ALK PHOS 62 77 111   LIPASE  --   --  26                     Lab 09/06/23  0426   PH, ARTERIAL 7.436   PCO2, ARTERIAL 32.2*   PO2 ART 83.3   FIO2 21   HCO3 ART 21.7   BASE EXCESS ART -1.9*   CARBOXYHEMOGLOBIN 0.9     Brief Urine Lab Results  (Last result in the past 365 days)        Color   Clarity   Blood   Leuk Est   Nitrite   Protein   CREAT   Urine HCG        09/05/23 2309 Yellow   Turbid   Large (3+)   Moderate (2+)   Negative   >=300 mg/dL (3+)                   Microbiology Results Abnormal       Procedure Component Value - Date/Time    Blood Culture - Blood, Hand, Left [[id number]]  (Normal) Collected: 09/07/23 8156    Lab Status:  Preliminary result Specimen: Blood from Hand, Left Updated: 09/10/23 0815     Blood Culture No growth at 3 days    Narrative:      Aerobic bottle only      Blood Culture - Blood, Hand, Right [[id number]]  (Normal) Collected: 09/07/23 0750    Lab Status: Preliminary result Specimen: Blood from Hand, Right Updated: 09/10/23 0815     Blood Culture No growth at 3 days    Respiratory Panel PCR w/COVID-19(SARS-CoV-2) [facility] In-House, NP Swab in UTM/VTM, 3-4 HR TAT - Swab, Nasopharynx [[id number]]  (Normal) Collected: 09/08/23 1714    Lab Status: Final result Specimen: Swab from Nasopharynx Updated: 09/08/23 1818     ADENOVIRUS, PCR Not Detected     Coronavirus 229E Not Detected     Coronavirus HKU1 Not Detected     Coronavirus NL63 Not Detected     Coronavirus OC43 Not Detected     COVID19 Not Detected     Human Metapneumovirus Not Detected     Human Rhinovirus/Enterovirus Not Detected     Influenza A PCR Not Detected     Influenza B PCR Not Detected     Parainfluenza Virus 1 Not Detected     Parainfluenza Virus 2 Not Detected     Parainfluenza Virus 3 Not Detected     Parainfluenza Virus 4 Not Detected     RSV, PCR Not Detected     Bordetella pertussis pcr Not Detected     Bordetella parapertussis PCR Not Detected     Chlamydophila pneumoniae PCR Not Detected     Mycoplasma pneumo by PCR Not Detected    Narrative:      In the setting of a positive respiratory panel with a viral infection PLUS a negative procalcitonin without other underlying concern for bacterial infection, consider observing off antibiotics or discontinuation of antibiotics and continue supportive care. If the respiratory panel is positive for atypical bacterial infection (Bordetella pertussis, Chlamydophila pneumoniae, or Mycoplasma pneumoniae), consider antibiotic de-escalation to target atypical bacterial infection.    Respiratory Panel PCR w/COVID-19(SARS-CoV-2) [facility] In-House, NP Swab in UTM/VTM, 3-4 HR TAT  - Swab, Nasopharynx [[id number]]  (Normal) Collected: 09/06/23 0620    Lab Status: Final result Specimen: Swab from Nasopharynx Updated: 09/06/23 0744     ADENOVIRUS, PCR Not Detected     Coronavirus 229E Not Detected     Coronavirus HKU1 Not Detected     Coronavirus NL63 Not Detected     Coronavirus OC43 Not Detected     COVID19 Not Detected     Human Metapneumovirus Not Detected     Human Rhinovirus/Enterovirus Not Detected     Influenza A PCR Not Detected     Influenza B PCR Not Detected     Parainfluenza Virus 1 Not Detected     Parainfluenza Virus 2 Not Detected     Parainfluenza Virus 3 Not Detected     Parainfluenza Virus 4 Not Detected     RSV, PCR Not Detected     Bordetella pertussis pcr Not Detected     Bordetella parapertussis PCR Not Detected     Chlamydophila pneumoniae PCR Not Detected     Mycoplasma pneumo by PCR Not Detected    Narrative:      In the setting of a positive respiratory panel with a viral infection PLUS a negative procalcitonin without other underlying concern for bacterial infection, consider observing off antibiotics or discontinuation of antibiotics and continue supportive care. If the respiratory panel is positive for atypical bacterial infection (Bordetella pertussis, Chlamydophila pneumoniae, or Mycoplasma pneumoniae), consider antibiotic de-escalation to target atypical bacterial infection.            No radiology results from the last 24 hrs    Results for orders placed during the hospital encounter of 12/06/22    Adult Transthoracic Echo Complete W/ Cont if Necessary Per Protocol    Interpretation Summary    Left ventricular ejection fraction appears to be greater than 70%.    Left ventricular wall thickness is consistent with moderate concentric hypertrophy.    Left ventricular diastolic function is consistent with (grade I) impaired relaxation.    Estimated right ventricular systolic pressure from tricuspid regurgitation is mildly elevated (35-45 mmHg). Calculated right  ventricular systolic pressure from tricuspid regurgitation is 39 mmHg.      Current medications:  Scheduled Meds:amLODIPine, 10 mg, Oral, Q24H  carvedilol, 12.5 mg, Oral, BID With Meals  cefTRIAXone, 2,000 mg, Intravenous, Q24H  famotidine, 20 mg, Intravenous, Daily  ferrous sulfate, 325 mg, Oral, Daily With Breakfast  hydrALAZINE, 50 mg, Oral, Q8H  ibuprofen, 400 mg, Oral, Q6H  insulin detemir, 10 Units, Subcutaneous, Daily  insulin lispro, 2-7 Units, Subcutaneous, 4x Daily AC & at Bedtime  lactobacillus acidophilus, 1 capsule, Oral, Daily  Lidocaine, 1 patch, Transdermal, Q24H  mirtazapine, 7.5 mg, Oral, Nightly  multivitamin, 1 tablet, Oral, Daily  rivaroxaban, 15 mg, Oral, Daily With Dinner  sodium chloride, 10 mL, Intravenous, Q12H  terazosin, 1 mg, Oral, Nightly  vancomycin, 125 mg, Oral, BID      Continuous Infusions:niCARdipine, 5-15 mg/hr, Last Rate: Stopped (09/09/23 1902)      PRN Meds:.  acetaminophen    dextrose    dextrose    glucagon (human recombinant)    hydrALAZINE    melatonin    ondansetron **OR** ondansetron    oxyCODONE-acetaminophen    Potassium Replacement - Follow Nurse / BPA Driven Protocol    prochlorperazine **OR** prochlorperazine **OR** prochlorperazine    [COMPLETED] Insert Peripheral IV **AND** sodium chloride    sodium chloride    sodium chloride    Assessment & Plan   Assessment & Plan     Active Hospital Problems    Diagnosis  POA    **Hypertensive urgency [I16.0]  Yes    Acute UTI (urinary tract infection) [N39.0]  Yes    Nausea vomiting and diarrhea [R11.2, R19.7]  Yes    PAF (paroxysmal atrial fibrillation) [I48.0]  Yes    Anxiety associated with depression [F41.8]  Yes    Clavicle fracture [S42.009A]  Yes    CKD (chronic kidney disease) stage 3, GFR 30-59 ml/min [N18.30]  Yes    Hypokalemia [E87.6]  Yes    Gastroparesis [K31.84]  Yes    Uncontrolled type 1 diabetes mellitus with hyperglycemia [E10.65]  Yes    Hyperlipidemia [E78.5]  Yes    Hypertension [I10]  Yes      Resolved  Hospital Problems   No resolved problems to display.        Brief Hospital Course to date:  Thelma Michael is a 62 y.o. female w/ a hx of poorly controlled type 1 diabetes, Afib on Xarelto, iron deficiency anemia, gastroparesis s/p gastric stimulator, HTN, HLD, migraines, chronic urinary retention (previously performed self-catheterization), strokes (chronic right lentiform nucleus and left cerebellar lacunar infarcts found on MRI in February of 2023), tobacco abuse and GERD who presented to the emergency room with complaints of nausea, vomiting and diarrhea. Patient has had issues with fevers since the evening of 9/8, repeat blood cultures have been NGTD and no obvious new symptoms. Patient with negative procal and LA. Plans to consult ID for further evaluation. Patient has also had issues with recurrent hypertensive urgency, back on Cardene gtt this AM, plans to add PO hydralazine. Patient seen again on the morning of 9/10, has continued to have some issues with fever overnight, following with ID, back on IV rocephin. Patient's blood pressure overall improved and cardene again stopped, will discontinue and continue oral medications for hypertension. Repeat blood cultures from 9/7 negative to date, 9/9 pending at this time. Continue supportive care       **Positive blood cultures 2/2  -contaminant, stopped Vanc   --repeat BC obtained-  negative x 3 days   -- ID consulted and following, plans for again repeat blood cultures on 9/9, currently pending at this time.   -- Patient has been restarted on IV Rocephin for UTI, repeat UA pending at this time, continue to follow.           **Hypertensive urgency   -Cardene infusion weaned off, restarted and now on again this AM on 9/9, now stopped again this AM, d/c and continue orals as below.   - Plans to start patient on PO hydralazine  -continue routine meds including Coreg, Norvasc  --started on Terazosin nightly   -holding routine Losartan  --Coreg previously  increased.     **Hyperglycemia   **T1DM  -ABG w/ CO2 32; anion gap 22.0  -initial glucose 316  -s/p 10 units regular insulin   - Levemir 10 U daily started  -FSBG q ac/hs w/ sliding scale   -diabetic educator consult        **Nausea, vomiting, diarrhea   **Hx of gastroparesis   -CT abd/pel without acute findings other than bladder wall thickening c/w chronic cystitis   -Addendum: IVF have been stopped, continue to monitor BP   -clear liquid diet- advance as tolerated   --GI PCR + Enteroaggregative E.coli -- patient now with fever since the evening of 9/8, ID consult has been requested, continue to follow.   - Continued supportive care at this time     **Acute UTI   **Hx of neurogenic bladder (previously self-cath'd)  -UA w/ 4+ bacteria, 31-50 WBCs   -urine culture > 100k GNB  -previous urine culture + for enterococcus faecalis in 4/23 (previous results w/ mixed sameera)  - Rocephin restarted on 9/9 per ID, continue to follow.   -bladder scan q 4      **Hypokalemia   -replace per protocol      **CKD Stage III  -baseline CR 1.2-1.3  -current 1.2   -IVF now stopped   -monitor closely with N/V/D and minimal oral intake      **PAF   -continue coreg, Xarelto         Expected Discharge Location and Transportation: home   Expected Discharge 1-2 days pending BC results   Expected Discharge Date: 9/8/2023; Expected Discharge Time:      DVT prophylaxis:  Medical DVT prophylaxis orders are present.          CODE STATUS:   Code Status and Medical Interventions:   Ordered at: 09/06/23 0529     Code Status (Patient has no pulse and is not breathing):    CPR (Attempt to Resuscitate)     Medical Interventions (Patient has pulse or is breathing):    Full Support       RUY Jimenez DO  09/10/23        Electronically signed by HARI Jimenez DO at 09/10/23 1907       Consult Notes (last 48 hours)  Notes from 09/09/23 1214 through 09/11/23 1214   No notes of this type exist for this encounter.

## 2023-09-11 NOTE — PROGRESS NOTES
[facility] Medicine Services  PROGRESS NOTE    Patient Name: Thelma Michael  : 1960  MRN: [id number]    Date of Admission: 2023  Primary Care Physician: Monet Howe APRN    Subjective   Subjective     CC:  N/V/D, abdominal pain     HPI:  Patient is a 62-year-old female seen and examined by me again this a.m., she is continued to have intermittent fevers despite IV antibiotics.  She reports still having some generalized abdominal pain as well as loose bowel movement again overnight.  Plans are for follow-up CT of the abdomen pelvis today with IV contrast for further evaluation.  Patient's blood pressure remains well controlled.  She denies any other new acute complaints or problems including headache, chest pain, increasing SOA, cough, or active N/V at this time. Patient does appear acutely ill and debilitated.         ROS:  Gen- Continued fever overnight, some chills, no HA, reports fatigue and malaise   CV- No chest pain, palpitations  Resp- No cough, no SOA  GI- Reports again loose BM overnight, some continued abdominal pain, no active N/V       Objective   Objective     Vital Signs:   Temp:  [98.5 °F (36.9 °C)-101.3 °F (38.5 °C)] 98.5 °F (36.9 °C)  Heart Rate:  [70-92] 70  Resp:  [16] 16  BP: (125-158)/(62-78) 125/78     Physical Exam:  Constitutional: No acute distress, awake, alert, frail and acutely ill appearing, resting in bed   HENT: NCAT, nares patent, mucous membranes moist  Respiratory: Clear to auscultation bilaterally, respiratory effort normal   Cardiovascular: RRR, no murmurs, rubs, or gallops  Gastrointestinal: Positive bowel sounds, soft, minor tenderness to palpation, nondistended  Musculoskeletal: No bilateral ankle edema, no clubbing or cyanosis  Psychiatric: Appropriate affect, cooperative  Neurologic: Oriented x 3, strength symmetric in all extremities, Cranial Nerves grossly intact to confrontation, speech clear  Skin: No rashes      Results Reviewed:  LAB RESULTS:      Lab 09/11/23  0412 09/10/23  0343 09/09/23  1747 09/08/23  1626 09/06/23  0859 09/06/23  0610 09/05/23  2320   WBC 13.98* 6.77  --  6.47 4.84  --  7.29   HEMOGLOBIN 11.6* 10.4*  --  11.6* 11.5*  --  13.0   HEMATOCRIT 35.1 32.4*  --  36.3 34.6  --  39.1   PLATELETS 417 306  --  337 349  --  404   NEUTROS ABS 11.51* 3.68  --  3.88 2.73  --  6.14   IMMATURE GRANS (ABS) 0.06* 0.04  --  0.02 0.05  --  0.02   LYMPHS ABS 1.40 2.27  --  1.89 1.23  --  0.83   MONOS ABS 0.96* 0.73  --  0.67 0.82  --  0.29   EOS ABS 0.02 0.02  --  0.00 0.00  --  0.00   MCV 88.6 90.8  --  90.1 88.5  --  86.9   PROCALCITONIN  --   --   --  0.15  --   --  0.17   LACTATE  --   --  1.0 0.7  --  1.0  --          Lab 09/11/23  0412 09/10/23  1545 09/10/23  0343 09/08/23  1626 09/08/23  0511 09/07/23  1612 09/07/23  0436 09/06/23  0901 09/06/23  0859   SODIUM 143  --  141 145 141  --  143   < >  --    POTASSIUM 4.1 5.2 3.4* 3.9 3.9   < > 3.4*   < >  --    CHLORIDE 112*  --  111* 110* 108*  --  111*   < >  --    CO2 22.0  --  22.0 20.0* 19.0*  --  21.0*   < >  --    ANION GAP 9.0  --  8.0 15.0 14.0  --  11.0   < >  --    BUN 15  --  25* 17 17  --  20   < >  --    CREATININE 1.19*  --  1.50* 1.30* 1.27*  --  1.26*   < >  --    EGFR 51.2*  --  38.8* 46.0* 47.3*  --  47.8*   < >  --    GLUCOSE 93  --  76 127* 124*  --  58*   < >  --    CALCIUM 8.8  --  8.2* 8.6 8.5*  --  8.3*   < >  --    MAGNESIUM 1.9  --  2.1 1.8  --   --  2.0  --   --    PHOSPHORUS  --   --   --   --   --   --  2.6  --   --    HEMOGLOBIN A1C  --   --   --   --   --   --   --   --  11.10*    < > = values in this interval not displayed.         Lab 09/11/23  0412 09/10/23  0343 09/08/23  1626 09/05/23  2320   TOTAL PROTEIN 6.6 5.7* 6.5 8.2   ALBUMIN 3.2* 2.7* 3.2* 4.0   GLOBULIN 3.4 3.0 3.3 4.2   ALT (SGPT) 7 7 10 13   AST (SGOT) 13 11 14 36*   BILIRUBIN 0.3 0.3 0.4 0.5   ALK PHOS 79 62 77 111   LIPASE  --   --   --  26                     Lab  09/06/23  0426   PH, ARTERIAL 7.436   PCO2, ARTERIAL 32.2*   PO2 ART 83.3   FIO2 21   HCO3 ART 21.7   BASE EXCESS ART -1.9*   CARBOXYHEMOGLOBIN 0.9     Brief Urine Lab Results  (Last result in the past 365 days)        Color   Clarity   Blood   Leuk Est   Nitrite   Protein   CREAT   Urine HCG        09/05/23 2309 Yellow   Turbid   Large (3+)   Moderate (2+)   Negative   >=300 mg/dL (3+)                   Microbiology Results Abnormal       Procedure Component Value - Date/Time    Blood Culture - Blood, Hand, Left [[id number]]  (Normal) Collected: 09/07/23 0752    Lab Status: Preliminary result Specimen: Blood from Hand, Left Updated: 09/11/23 0815     Blood Culture No growth at 4 days    Narrative:      Aerobic bottle only      Blood Culture - Blood, Hand, Right [[id number]]  (Normal) Collected: 09/07/23 0750    Lab Status: Preliminary result Specimen: Blood from Hand, Right Updated: 09/11/23 0815     Blood Culture No growth at 4 days    Blood Culture - Blood, Hand, Right [[id number]]  (Normal) Collected: 09/09/23 1751    Lab Status: Preliminary result Specimen: Blood from Hand, Right Updated: 09/10/23 1845     Blood Culture No growth at 24 hours    Blood Culture - Blood, Hand, Left [[id number]]  (Normal) Collected: 09/09/23 1747    Lab Status: Preliminary result Specimen: Blood from Hand, Left Updated: 09/10/23 1845     Blood Culture No growth at 24 hours    Respiratory Panel PCR w/COVID-19(SARS-CoV-2) NORMA/JUAN ALBERTO/EDY/PAD/COR/MAD/ROSALINA In-House, NP Swab in UTM/VTM, 3-4 HR TAT - Swab, Nasopharynx [[id number]]  (Normal) Collected: 09/08/23 1714    Lab Status: Final result Specimen: Swab from Nasopharynx Updated: 09/08/23 1818     ADENOVIRUS, PCR Not Detected     Coronavirus 229E Not Detected     Coronavirus HKU1 Not Detected     Coronavirus NL63 Not Detected     Coronavirus OC43 Not Detected     COVID19 Not Detected     Human Metapneumovirus Not Detected     Human Rhinovirus/Enterovirus Not Detected     Influenza A PCR Not  Detected     Influenza B PCR Not Detected     Parainfluenza Virus 1 Not Detected     Parainfluenza Virus 2 Not Detected     Parainfluenza Virus 3 Not Detected     Parainfluenza Virus 4 Not Detected     RSV, PCR Not Detected     Bordetella pertussis pcr Not Detected     Bordetella parapertussis PCR Not Detected     Chlamydophila pneumoniae PCR Not Detected     Mycoplasma pneumo by PCR Not Detected    Narrative:      In the setting of a positive respiratory panel with a viral infection PLUS a negative procalcitonin without other underlying concern for bacterial infection, consider observing off antibiotics or discontinuation of antibiotics and continue supportive care. If the respiratory panel is positive for atypical bacterial infection (Bordetella pertussis, Chlamydophila pneumoniae, or Mycoplasma pneumoniae), consider antibiotic de-escalation to target atypical bacterial infection.    Respiratory Panel PCR w/COVID-19(SARS-CoV-2) NORMA/JUAN ALBERTO/EDY/PAD/COR/MAD/ROSALINA In-House, NP Swab in UTM/VTM, 3-4 HR TAT - Swab, Nasopharynx [[id number]]  (Normal) Collected: 09/06/23 0620    Lab Status: Final result Specimen: Swab from Nasopharynx Updated: 09/06/23 0744     ADENOVIRUS, PCR Not Detected     Coronavirus 229E Not Detected     Coronavirus HKU1 Not Detected     Coronavirus NL63 Not Detected     Coronavirus OC43 Not Detected     COVID19 Not Detected     Human Metapneumovirus Not Detected     Human Rhinovirus/Enterovirus Not Detected     Influenza A PCR Not Detected     Influenza B PCR Not Detected     Parainfluenza Virus 1 Not Detected     Parainfluenza Virus 2 Not Detected     Parainfluenza Virus 3 Not Detected     Parainfluenza Virus 4 Not Detected     RSV, PCR Not Detected     Bordetella pertussis pcr Not Detected     Bordetella parapertussis PCR Not Detected     Chlamydophila pneumoniae PCR Not Detected     Mycoplasma pneumo by PCR Not Detected    Narrative:      In the setting of a positive respiratory panel with a viral  infection PLUS a negative procalcitonin without other underlying concern for bacterial infection, consider observing off antibiotics or discontinuation of antibiotics and continue supportive care. If the respiratory panel is positive for atypical bacterial infection (Bordetella pertussis, Chlamydophila pneumoniae, or Mycoplasma pneumoniae), consider antibiotic de-escalation to target atypical bacterial infection.            CT Abdomen Pelvis With Contrast    Result Date: 9/11/2023  CT ABDOMEN PELVIS W CONTRAST Date of Exam: 9/11/2023 10:45 AM EDT Indication: Abdominal pain, diarrhea. Comparison: 9/6/2023 Technique: Axial CT images were obtained of the abdomen and pelvis following the uneventful intravenous administration of 75 mL Isovue-300. Reconstructed coronal and sagittal images were also obtained. Automated exposure control and iterative construction methods were used. Findings: Lung Bases:  There is interval development of focal peripheral nodular airspace disease in the right lower lobe compatible with pneumonia versus nodular atelectasis. Again noticed a gastric stimulator device. Liver:Liver is normal in size and CT density. No focal lesions. Biliary/Gallbladder: The gallbladder is without evidence of radiopaque stones. The biliary tree is nondilated. Spleen:Spleen is normal in size and CT density. Pancreas: Pancreas shows homogeneous density. There is no evidence of pancreatic mass or peripancreatic fluid. Kidneys: Kidneys are normal in size. There are no stones or hydronephrosis. Few tiny left renal cysts Adrenals: Stable diffuse thickening of the adrenal glands compatible with adrenal hyperplasia Retroperitoneal/Lymph Nodes/Vasculature: No retroperitoneal adenopathy is identified by size criteria. Gastrointestinal/Mesentery: The bowel loops are non-dilated without definite wall thickening or mass. The appendix appears within normal limits. No evidence of obstruction. No free air. Air-fluid levels  throughout the large bowel with no associated wall thickening compatible with diarrhea Bladder: The bladder is better distended than on the previous examination with mild persistent thickening of the roof of the bladder wall which may represent chronic cystitis. There are no intraluminal calcifications   Bony Structures:  Visualized bony structures are consistent with the patient's age.     Impression: Impression: 1. Nondilated large bowel with air-fluid levels and no wall thickening in keeping with the patient's history of diarrhea 2. Interval development of a peripheral nodular density in the right lower lobe may represent nodular atelectasis or pneumonia. Clinical relation and close follow-up recommended with CT chest 3. Additional stable nonemergent findings as above Electronically Signed: Quinton Juarez MD  9/11/2023 12:12 PM EDT  Workstation ID: [id number]     Results for orders placed during the hospital encounter of 12/06/22    Adult Transthoracic Echo Complete W/ Cont if Necessary Per Protocol    Interpretation Summary    Left ventricular ejection fraction appears to be greater than 70%.    Left ventricular wall thickness is consistent with moderate concentric hypertrophy.    Left ventricular diastolic function is consistent with (grade I) impaired relaxation.    Estimated right ventricular systolic pressure from tricuspid regurgitation is mildly elevated (35-45 mmHg). Calculated right ventricular systolic pressure from tricuspid regurgitation is 39 mmHg.      Current medications:  Scheduled Meds:amLODIPine, 10 mg, Oral, Q24H  carvedilol, 12.5 mg, Oral, BID With Meals  famotidine, 20 mg, Oral, QAM  ferrous sulfate, 325 mg, Oral, Daily With Breakfast  hydrALAZINE, 50 mg, Oral, Q8H  ibuprofen, 400 mg, Oral, Q6H  insulin detemir, 10 Units, Subcutaneous, Daily  insulin lispro, 2-7 Units, Subcutaneous, 4x Daily AC & at Bedtime  lactobacillus acidophilus, 1 capsule, Oral, Daily  Lidocaine, 1 patch, Transdermal,  Q24H  mirtazapine, 7.5 mg, Oral, Nightly  multivitamin, 1 tablet, Oral, Daily  piperacillin-tazobactam, 3.375 g, Intravenous, Once  piperacillin-tazobactam, 3.375 g, Intravenous, Q8H  rivaroxaban, 15 mg, Oral, Daily With Dinner  sodium chloride, 10 mL, Intravenous, Q12H  terazosin, 1 mg, Oral, Nightly  vancomycin, 125 mg, Oral, BID      Continuous Infusions:     PRN Meds:.  acetaminophen    dextrose    dextrose    glucagon (human recombinant)    hydrALAZINE    melatonin    ondansetron **OR** ondansetron    oxyCODONE-acetaminophen    Potassium Replacement - Follow Nurse / BPA Driven Protocol    prochlorperazine **OR** prochlorperazine **OR** prochlorperazine    [COMPLETED] Insert Peripheral IV **AND** sodium chloride    sodium chloride    sodium chloride    Assessment & Plan   Assessment & Plan     Active Hospital Problems    Diagnosis  POA    **Hypertensive urgency [I16.0]  Yes    Acute UTI (urinary tract infection) [N39.0]  Yes    Nausea vomiting and diarrhea [R11.2, R19.7]  Yes    PAF (paroxysmal atrial fibrillation) [I48.0]  Yes    Anxiety associated with depression [F41.8]  Yes    Clavicle fracture [S42.009A]  Yes    CKD (chronic kidney disease) stage 3, GFR 30-59 ml/min [N18.30]  Yes    Hypokalemia [E87.6]  Yes    Gastroparesis [K31.84]  Yes    Uncontrolled type 1 diabetes mellitus with hyperglycemia [E10.65]  Yes    Hyperlipidemia [E78.5]  Yes    Hypertension [I10]  Yes      Resolved Hospital Problems   No resolved problems to display.        Brief Hospital Course to date:  Thelma Michael is a 62 y.o. female w/ a hx of poorly controlled type 1 diabetes, Afib on Xarelto, iron deficiency anemia, gastroparesis s/p gastric stimulator, HTN, HLD, migraines, chronic urinary retention (previously performed self-catheterization), strokes (chronic right lentiform nucleus and left cerebellar lacunar infarcts found on MRI in February of 2023), tobacco abuse and GERD who presented to the emergency room with  complaints of nausea, vomiting and diarrhea. Patient has had issues with fevers since the evening of 9/8, repeat blood cultures have been NGTD and no obvious new symptoms. Patient with negative procal and LA. Plans to consult ID for further evaluation. Patient has also had issues with recurrent hypertensive urgency, back on Cardene gtt this AM, plans to add PO hydralazine. Patient seen again on the morning of 9/10, has continued to have some issues with fever overnight, following with ID, back on IV rocephin. Patient's blood pressure overall improved and cardene again stopped, will discontinue and continue oral medications for hypertension. Repeat blood cultures from 9/7 negative to date, 9/9 pending at this time. Continue supportive care       **Positive blood cultures 2/2  Abdominal pain, Sepsis   Fever  - suspected contaminate, Vancomycin then stopped, ID now following   --repeat BC obtained-  negative x 4 days   -- ID consulted and following, plans for again repeat blood cultures on 9/9, currently negative x 24 hrs   -- Patient has been restarted on IV Rocephin for UTI, patient continues to have abdominal pain, plans for repeat CT abd/pelvis today with IV contrast.  -- CT abd/pelvis reviewed, negative for acute findings of abdomen but did recommend possible follow up CT chest for possible pneumonia, will order at this time. No obvious respiratory symptoms, no cough, no increased SOA, remains on RA.  -- Previous procal and LA negative, will order repeat at this time as patient continues to have repeat fevers overnight   -- Patient changed to IV Zosyn this AM per ID. Continue to follow, depending on CT chest results may need to add atypical coverage for possible pneumonia but patient currently not symptomatic for pneumonia      **Hypertensive urgency   -Cardene infusion weaned off, restarted and now on again this AM on 9/9, now stopped again this AM, d/c and continue orals as below.   - Plans to start patient on  PO hydralazine  -continue routine meds including Coreg, Norvasc  --started on Terazosin nightly   -holding routine Losartan  --Coreg previously increased.     **Hyperglycemia   **T1DM  -ABG w/ CO2 32; anion gap 22.0  -initial glucose 316  -s/p 10 units regular insulin   - Levemir 10 U daily started  -FSBG q ac/hs w/ sliding scale   -diabetic educator consult        **Nausea, vomiting, diarrhea   **Hx of gastroparesis   -CT abd/pel without acute findings other than bladder wall thickening c/w chronic cystitis   -Addendum: IVF have been stopped, continue to monitor BP   -clear liquid diet- advance as tolerated   --GI PCR + Enteroaggregative E.coli -- patient now with fever since the evening of 9/8, ID consult has been requested, continue to follow.   - Continued supportive care at this time     **Acute UTI   **Hx of neurogenic bladder (previously self-cath'd)  -UA w/ 4+ bacteria, 31-50 WBCs   -urine culture > 100k GNB  -previous urine culture + for enterococcus faecalis in 4/23 (previous results w/ mixed sameera)  - Rocephin restarted on 9/9 per ID, continue to follow.   -bladder scan q 4      **Hypokalemia   -replace per protocol      **CKD Stage III  -baseline CR 1.2-1.3  -current 1.2   -IVF now stopped   -monitor closely with N/V/D and minimal oral intake      **PAF   -continue coreg, Xarelto         Expected Discharge Location and Transportation: home   Expected Discharge 1-2 days pending BC results   Expected Discharge Date: 9/8/2023; Expected Discharge Time:      DVT prophylaxis:  Medical DVT prophylaxis orders are present.          CODE STATUS:   Code Status and Medical Interventions:   Ordered at: 09/06/23 0529     Code Status (Patient has no pulse and is not breathing):    CPR (Attempt to Resuscitate)     Medical Interventions (Patient has pulse or is breathing):    Full Support       RUY Jimenez,   09/11/23

## 2023-09-11 NOTE — PROGRESS NOTES
INFECTIOUS DISEASE PROGRESS NOTE    Thelma Michael  1960  [id number]    Date of Consult: 9/9/2023    Admission Date: 9/5/2023      Requesting Provider: Skip Jimenez DO  Evaluating Physician: Pedro Tucker MD    Reason for Consultation: febrile, recent diarrhea/E. coli infection, ? Contaminated blood cultures    History of present illness:    Patient is a 62 y.o. female with h/o T1DM/poorly controlled, afib/Xarelto, anemia, gastroparesis/gastric stimulator, , GERD, TIAs, HLD, HTN, CKD Stage IIIa (baseline 0.9-1.25), migraines, CVA, malnutrition, former tobacco abuse/quit 1 year ago, chronic urinary retention/not self cathing for over a year, smokes marijuana occasionally for back pain control and appetite stimulator, and recent distal left clavicle fracture 9/1/23 who presented to [facility] ED on 9/5 for nausea, vomiting, and diarrhea for 2 days prior to arrival.  On arrival, she had 3 episodes of diarrhea in ED with decrease oral intake.  She denied fever, chills, shortness of breath, or dysuria.  She denies any hematemesis, hematochezia, or melena.  She had blood on toilet paper and has h/o hemorrhoids.  She has had fevers up to 102.5 last night.  Labs were WBC 7300 with 84% neutrophils, PCT 0.17, creatinine 1.22, A1C 11.2, and UA WBC 31-50 with moderate LE and negative nitrite.  Urine culture is positive for E.coli (pan sens).  Blood cultures are positive in 2 of 2 sets (2 of 4 bottles) with CoNS.  A GI panel PCR was positive for Enteroaggregative E.coli.  A respiratory panel PCR is negative. A CXR on 9/5 showed no acute findings. A CT scan of a/p without contrast on 9/6 showed no acute findings.  She was on oral Vancomycin from 9/5 to 9/6.  She was given a dose of Rocephin on 9/6 and a diose of IV Vancomycin on 9/7.  She is currently not on any antibiotic therapy.  ID was asked to evaluate.      Of note: she has not had a Cdiff done on this visit, but she had a Cdiff test on 8/11/23 with PCR  positive and toxin EIA negative.  She states they did do a test on this visit and it was negative. She also has had positive EAEC multiple times on 3/22/22, 3/26/22,12/10/22, 8/11/23, and now 9/7/23.  It appears that she has chronic issues with gastroparesis flares and diarrhea.  Her EAEC has never been treated because of her prolonged QT with Cipro and Azithromycin contraindicated.     9/10/23: Tmax 103.1. Fevers improved this morning. She states she had a watery stool early this morning.  Denies shortness of breath, vomiting, rashes.  Still with abdominal tenderness and nausea.     9/11/23: Tmax 101 today.  She continues to not feel well.  She had 1 watery stool this a.m. She denies n/v, shortness of breath, rashes, or dysuria.  She complains of mid abdominal pain.  She has no appetite but was will to have an Ensure. WBC worse at 13.98.  Creatinine improved to 1.19 with CrCl of 45.      Past Medical History:   Diagnosis Date    Acid reflux     Acute bronchitis     Cardiac murmur     Depression with anxiety 10/5/2020    Diabetes mellitus     Gastroesophageal reflux disease 5/13/2016    H/O echocardiogram 08/07/2012    i. LVEF 65%.ii. Mild LVH.iii. Borderline evidence of atrial septal aneurysm.  No PFO.     History of nuclear stress test 08/22/2014    Negative for ischemia and scars; LVEF 77%.      History of TIAs 7/15/2021    Hyperlipidemia     Hypertension     Impacted cerumen of both ears     Migraine     Migraines 10/31/2019    Self-catheterizes urinary bladder     Sinusitis     Stroke     Tobacco abuse     quit 4 days ago.      Urticaria     Vitamin D deficiency 5/13/2016       Past Surgical History:   Procedure Laterality Date    CAPSULE ENDOSCOPY  07/27/2021    Procedure: PILLCAM DEPLOYMENT;  Surgeon: Mikael Worthy MD;  Location:  [facility] ENDOSCOPY;  Service: Gastroenterology;;    COLONOSCOPY      COLONOSCOPY N/A 07/27/2021    Procedure: COLONOSCOPY;  Surgeon: Mikael Worthy MD;  Location:  [facility]  ENDOSCOPY;  Service: Gastroenterology;  Laterality: N/A;    DENTAL PROCEDURE      ENDOSCOPY N/A 2021    Procedure: ESOPHAGOGASTRODUODENOSCOPY;  Surgeon: Brunner, Mark I, MD;  Location:  [facility];  Service: Gastroenterology;  Laterality: N/A;    ENDOSCOPY N/A 2021    Procedure: ESOPHAGOGASTRODUODENOSCOPY;  Surgeon: Mikael Worthy MD;  Location:  [facility];  Service: Gastroenterology;  Laterality: N/A;    ENDOSCOPY WITH JTUBE N/A 2022    Procedure: ESOPHAGOGASTRODUODENOSCOPY WITH JEJUNAL TUBE INSERTION;  Surgeon: Thom Glover MD;  Location:  [facility];  Service: Gastroenterology;  Laterality: N/A;    UPPER GASTROINTESTINAL ENDOSCOPY         Family History   Problem Relation Age of Onset    Anxiety disorder Other     Arthritis Other     ADD / ADHD Other     Heart disease Other         cardiac disorder    Depression Other     Diabetes Other     Hyperlipidemia Other     Hypertension Other     Lung cancer Other     Osteoporosis Other     Hypertension Brother     Diabetes Brother     Hyperlipidemia Mother     Hypertension Mother     Colon cancer Neg Hx        Social History     Socioeconomic History    Marital status:    Tobacco Use    Smoking status: Former     Packs/day: 0.25     Years: 30.00     Pack years: 7.50     Types: Cigarettes     Quit date: 2019     Years since quittin.2    Smokeless tobacco: Never    Tobacco comments:     BC PL never smoker    Vaping Use    Vaping Use: Never used   Substance and Sexual Activity    Alcohol use: Yes     Alcohol/week: 1.0 standard drink     Types: 1 Glasses of wine per week     Comment: ocassional    Drug use: Yes     Frequency: 2.0 times per week     Types: Marijuana    Sexual activity: Not Currently     Comment: Single        No Known Allergies      Medication:    Current Facility-Administered Medications:     acetaminophen (TYLENOL) tablet 650 mg, 650 mg, Oral, Q6H PRN, HARI Jimenez, DO, 650 mg at 23  0920    amLODIPine (NORVASC) tablet 10 mg, 10 mg, Oral, Q24H, Magali Delvalle APRN, 10 mg at 09/11/23 0905    carvedilol (COREG) tablet 12.5 mg, 12.5 mg, Oral, BID With Meals, VickiSusan, APRN, 12.5 mg at 09/11/23 1705    dextrose (D50W) (25 g/50 mL) IV injection 25 g, 25 g, Intravenous, Q15 Min PRN, Magali Delvalle APRN    dextrose (GLUTOSE) oral gel 15 g, 15 g, Oral, Q15 Min PRN, Magali Delvalle APRN    famotidine (PEPCID) tablet 20 mg, 20 mg, Oral, Vu HUMPHREY Stacy D, PharmD, 20 mg at 09/11/23 0906    ferrous sulfate tablet 325 mg, 325 mg, Oral, Daily With Breakfast, Magali Delvalle APRN, 325 mg at 09/11/23 0906    glucagon (GLUCAGEN) injection 1 mg, 1 mg, Intramuscular, Q15 Min PRN, Magali Delvalle APRN    hydrALAZINE (APRESOLINE) injection 10 mg, 10 mg, Intravenous, Q6H PRN, HARI Jimenez, , 10 mg at 09/08/23 1023    hydrALAZINE (APRESOLINE) tablet 50 mg, 50 mg, Oral, Q8H, HARI Jimenez DO, 50 mg at 09/11/23 1338    ibuprofen (ADVIL,MOTRIN) tablet 400 mg, 400 mg, Oral, Q6H, HARI Jimenez DO, 400 mg at 09/11/23 1705    insulin detemir (LEVEMIR) injection 10 Units, 10 Units, Subcutaneous, Daily, HARI Jimenez DO, 10 Units at 09/11/23 0905    Insulin Lispro (humaLOG) injection 2-7 Units, 2-7 Units, Subcutaneous, 4x Daily AC & at Bedtime, Magali Delvalle APRN, 3 Units at 09/11/23 1204    lactobacillus acidophilus (RISAQUAD) capsule 1 capsule, 1 capsule, Oral, Daily, Magali Delvalle APRN, 1 capsule at 09/11/23 0905    Lidocaine 4 % 1 patch, 1 patch, Transdermal, Q24H, Cordray, Faith, MD, 1 patch at 09/10/23 1010    melatonin tablet 5 mg, 5 mg, Oral, Nightly PRN, Magali Delvalle APRN, 5 mg at 09/10/23 2116    mirtazapine (REMERON) tablet 7.5 mg, 7.5 mg, Oral, Nightly, Martin Martinez III, DO, 7.5 mg at 09/10/23 2116    multivitamin (THERAGRAN) tablet 1 tablet, 1 tablet, Oral, Daily, Magali Delvalle APRN, 1 tablet at 09/11/23 0906    ondansetron (ZOFRAN) tablet 4 mg, 4 mg, Oral, Q6H PRN, 4  mg at 09/06/23 2105 **OR** ondansetron (ZOFRAN) injection 4 mg, 4 mg, Intravenous, Q6H PRN, Magali Delvalle APRN, 4 mg at 09/07/23 2227    oxyCODONE-acetaminophen (PERCOCET) 5-325 MG per tablet 1 tablet, 1 tablet, Oral, Q6H PRN, Faith Nelson MD, 1 tablet at 09/10/23 2116    piperacillin-tazobactam (ZOSYN) 3.375 g in iso-osmotic dextrose 50 ml (premix), 3.375 g, Intravenous, Q8H, Rene García PA, 3.375 g at 09/11/23 1709    Potassium Replacement - Follow Nurse / BPA Driven Protocol, , Does not apply, PRN, Susan Cohen APRN    prochlorperazine (COMPAZINE) injection 5 mg, 5 mg, Intravenous, Q6H PRN, 5 mg at 09/07/23 1120 **OR** prochlorperazine (COMPAZINE) tablet 5 mg, 5 mg, Oral, Q6H PRN, 5 mg at 09/07/23 2057 **OR** prochlorperazine (COMPAZINE) suppository 25 mg, 25 mg, Rectal, Q12H PRN, HARI Jimenez DO    rivaroxaban (XARELTO) tablet 15 mg, 15 mg, Oral, Daily With Dinner, Magali Delvalle APRN, 15 mg at 09/11/23 1705    [COMPLETED] Insert Peripheral IV, , , Once **AND** sodium chloride 0.9 % flush 10 mL, 10 mL, Intravenous, PRN, Nas Patel MD    sodium chloride 0.9 % flush 10 mL, 10 mL, Intravenous, Q12H, Magali Delvalle APRN, 10 mL at 09/11/23 0907    sodium chloride 0.9 % flush 10 mL, 10 mL, Intravenous, PRN, Magali Delvalle APRN    sodium chloride 0.9 % infusion 40 mL, 40 mL, Intravenous, PRN, Magali Delvalle APRN    terazosin (HYTRIN) capsule 1 mg, 1 mg, Oral, Nightly, Magali Delvalle APRN, 1 mg at 09/10/23 2116    vancomycin (VANCOCIN) capsule 125 mg, 125 mg, Oral, BID, Rene García PA, 125 mg at 09/11/23 0906    Antibiotics:  Anti-Infectives (From admission, onward)      Ordered     Dose/Rate Route Frequency Start Stop    09/11/23 1159  piperacillin-tazobactam (ZOSYN) 3.375 g in iso-osmotic dextrose 50 ml (premix)        Ordering Provider: Rene García PA    3.375 g  over 4 Hours Intravenous Every 8 Hours 09/11/23 1900 09/21/23 1859    09/11/23 1159  piperacillin-tazobactam (ZOSYN)  3.375 g in iso-osmotic dextrose 50 ml (premix)        Ordering Provider: Rene García PA    3.375 g  over 30 Minutes Intravenous Once 23 1245 23 1406    23 1856  DAPTOmycin (CUBICIN) 350 mg in sodium chloride 0.9 % 50 mL IVPB        Ordering Provider: Pedro Tucker MD    6 mg/kg × 62.3 kg (Adjusted)  100 mL/hr over 30 Minutes Intravenous Once 23 1945 23 2051    23 1119  vancomycin (VANCOCIN) capsule 125 mg        Ordering Provider: Rene García PA    125 mg Oral 2 Times Daily 23 1300 23 0859    23 0331  vancomycin 1250 mg/250 mL 0.9% NS IVPB ([facility])        Ordering Provider: Vanessa Francisco APRN    20 mg/kg × 59.7 kg  over 75 Minutes Intravenous Once 23 0430 23 0652    23 1233  fosfomycin (MONUROL) packet 3 g        Ordering Provider: Faith Nelson MD    3 g Oral Once 23 1330 23 1332              Review of Systems:  See above.       Physical Exam:   Vital Signs  Temp (24hrs), Av.9 °F (37.7 °C), Min:98.5 °F (36.9 °C), Max:101.3 °F (38.5 °C)    Temp  Min: 98.5 °F (36.9 °C)  Max: 101.3 °F (38.5 °C)  BP  Min: 121/77  Max: 158/74  Pulse  Min: 70  Max: 92  Resp  Min: 16  Max: 16  SpO2  Min: 98 %  Max: 100 %    GENERAL: Awake and alert, in no acute distress.   HEENT: Normocephalic, atraumatic.  No conjunctival injection. No icterus. Oropharynx clear without evidence of thrush or exudate.   NECK: Supple without nuchal rigidity. No mass.  LYMPH: No cervical, axillary or inguinal lymphadenopathy.  HEART: RRR; No murmur, rubs, gallops.   LUNGS: Clear to auscultation bilaterally without wheezing, rales, rhonchi. Normal respiratory effort. Nonlabored.   ABDOMEN: Soft, mild mid abdominal tenderness, nondistended. Positive bowel sounds. No rebound or guarding.  EXT:  No cyanosis, clubbing or edema. No cord.  :  Without Ansari catheter.  MSK: No joint effusions or erythema  SKIN: Warm and dry without cutaneous eruptions on  Inspection/palpation.    NEURO: Oriented to PPT.  Motor 5/5 strength  PSYCHIATRIC: Normal insight and judgment. Cooperative with PE    Laboratory Data    Results from last 7 days   Lab Units 09/11/23  0412 09/10/23  0343 09/08/23  1626   WBC 10*3/mm3 13.98* 6.77 6.47   HEMOGLOBIN g/dL 11.6* 10.4* 11.6*   HEMATOCRIT % 35.1 32.4* 36.3   PLATELETS 10*3/mm3 417 306 337     Results from last 7 days   Lab Units 09/11/23  0412   SODIUM mmol/L 143   POTASSIUM mmol/L 4.1   CHLORIDE mmol/L 112*   CO2 mmol/L 22.0   BUN mg/dL 15   CREATININE mg/dL 1.19*   GLUCOSE mg/dL 93   CALCIUM mg/dL 8.8     Results from last 7 days   Lab Units 09/11/23  0412   ALK PHOS U/L 79   BILIRUBIN mg/dL 0.3   ALT (SGPT) U/L 7   AST (SGOT) U/L 13             Results from last 7 days   Lab Units 09/11/23  1602   LACTATE mmol/L 1.5     Results from last 7 days   Lab Units 09/10/23  0343   CK TOTAL U/L 45     Results from last 7 days   Lab Units 09/08/23  0511   VANCOMYCIN RM mcg/mL 8.30     Estimated Creatinine Clearance: 44.7 mL/min (A) (by C-G formula based on SCr of 1.19 mg/dL (H)).      Microbiology:  Microbiology Results (last 10 days)       Procedure Component Value - Date/Time    Blood Culture - Blood, Hand, Right [[id number]]  (Normal) Collected: 09/09/23 1751    Lab Status: Preliminary result Specimen: Blood from Hand, Right Updated: 09/11/23 1845     Blood Culture No growth at 2 days    Blood Culture - Blood, Hand, Left [[id number]]  (Normal) Collected: 09/09/23 1747    Lab Status: Preliminary result Specimen: Blood from Hand, Left Updated: 09/11/23 1845     Blood Culture No growth at 2 days    Respiratory Panel PCR w/COVID-19(SARS-CoV-2) [facility] In-House, NP Swab in UTM/VTM, 3-4 HR TAT - Swab, Nasopharynx [[id number]]  (Normal) Collected: 09/08/23 1714    Lab Status: Final result Specimen: Swab from Nasopharynx Updated: 09/08/23 1814     ADENOVIRUS, PCR Not Detected     Coronavirus 229E Not Detected     Coronavirus HKU1 Not  Detected     Coronavirus NL63 Not Detected     Coronavirus OC43 Not Detected     COVID19 Not Detected     Human Metapneumovirus Not Detected     Human Rhinovirus/Enterovirus Not Detected     Influenza A PCR Not Detected     Influenza B PCR Not Detected     Parainfluenza Virus 1 Not Detected     Parainfluenza Virus 2 Not Detected     Parainfluenza Virus 3 Not Detected     Parainfluenza Virus 4 Not Detected     RSV, PCR Not Detected     Bordetella pertussis pcr Not Detected     Bordetella parapertussis PCR Not Detected     Chlamydophila pneumoniae PCR Not Detected     Mycoplasma pneumo by PCR Not Detected    Narrative:      In the setting of a positive respiratory panel with a viral infection PLUS a negative procalcitonin without other underlying concern for bacterial infection, consider observing off antibiotics or discontinuation of antibiotics and continue supportive care. If the respiratory panel is positive for atypical bacterial infection (Bordetella pertussis, Chlamydophila pneumoniae, or Mycoplasma pneumoniae), consider antibiotic de-escalation to target atypical bacterial infection.    Gastrointestinal Panel, PCR - Stool, Per Rectum [[id number]]  (Abnormal) Collected: 09/07/23 1018    Lab Status: Final result Specimen: Stool from Per Rectum Updated: 09/07/23 1234     Campylobacter Not Detected     Plesiomonas shigelloides Not Detected     Salmonella Not Detected     Vibrio Not Detected     Vibrio cholerae Not Detected     Yersinia enterocolitica Not Detected     Enteroaggregative E. coli (EAEC) Detected     Enteropathogenic E. coli (EPEC) Not Detected     Enterotoxigenic E. coli (ETEC) lt/st Not Detected     Shiga-like toxin-producing E. coli (STEC) stx1/stx2 Not Detected     Shigella/Enteroinvasive E. coli (EIEC) Not Detected     Cryptosporidium Not Detected     Cyclospora cayetanensis Not Detected     Entamoeba histolytica Not Detected     Giardia lamblia Not Detected     Adenovirus F40/41 Not Detected      Astrovirus Not Detected     Norovirus GI/GII Not Detected     Rotavirus A Not Detected     Sapovirus (I, II, IV or V) Not Detected    Blood Culture - Blood, Hand, Left [[id number]]  (Normal) Collected: 09/07/23 0752    Lab Status: Preliminary result Specimen: Blood from Hand, Left Updated: 09/11/23 0815     Blood Culture No growth at 4 days    Narrative:      Aerobic bottle only      Blood Culture - Blood, Hand, Right [[id number]]  (Normal) Collected: 09/07/23 0750    Lab Status: Preliminary result Specimen: Blood from Hand, Right Updated: 09/11/23 0815     Blood Culture No growth at 4 days    Respiratory Panel PCR w/COVID-19(SARS-CoV-2) [facility]-House, NP Swab in UTM/VTM, 3-4 HR TAT - Swab, Nasopharynx [[id number]]  (Normal) Collected: 09/06/23 0620    Lab Status: Final result Specimen: Swab from Nasopharynx Updated: 09/06/23 0744     ADENOVIRUS, PCR Not Detected     Coronavirus 229E Not Detected     Coronavirus HKU1 Not Detected     Coronavirus NL63 Not Detected     Coronavirus OC43 Not Detected     COVID19 Not Detected     Human Metapneumovirus Not Detected     Human Rhinovirus/Enterovirus Not Detected     Influenza A PCR Not Detected     Influenza B PCR Not Detected     Parainfluenza Virus 1 Not Detected     Parainfluenza Virus 2 Not Detected     Parainfluenza Virus 3 Not Detected     Parainfluenza Virus 4 Not Detected     RSV, PCR Not Detected     Bordetella pertussis pcr Not Detected     Bordetella parapertussis PCR Not Detected     Chlamydophila pneumoniae PCR Not Detected     Mycoplasma pneumo by PCR Not Detected    Narrative:      In the setting of a positive respiratory panel with a viral infection PLUS a negative procalcitonin without other underlying concern for bacterial infection, consider observing off antibiotics or discontinuation of antibiotics and continue supportive care. If the respiratory panel is positive for atypical bacterial infection (Bordetella pertussis, Chlamydophila  pneumoniae, or Mycoplasma pneumoniae), consider antibiotic de-escalation to target atypical bacterial infection.    Blood Culture - Blood, Arm, Left [[id number]]  (Abnormal) Collected: 09/06/23 0600    Lab Status: Final result Specimen: Blood from Arm, Left Updated: 09/10/23 0606     Blood Culture Staphylococcus warneri     Isolated from Aerobic Bottle     Gram Stain Aerobic Bottle Gram positive cocci in groups    Narrative:      Probable contaminant requires clinical correlation, susceptibility not performed unless requested by physician.        Blood Culture ID, PCR - Blood, Arm, Left [[id number]]  (Abnormal) Collected: 09/06/23 0600    Lab Status: Final result Specimen: Blood from Arm, Left Updated: 09/07/23 0133     BCID, PCR Staph spp, not aureus or lugdunensis. Identification by BCID2 PCR.     BOTTLE TYPE Aerobic Bottle    Blood Culture - Blood, Arm, Left [[id number]]  (Abnormal) Collected: 09/06/23 0550    Lab Status: Final result Specimen: Blood from Arm, Left Updated: 09/10/23 0606     Blood Culture Staphylococcus hominis ssp hominis     Isolated from Aerobic Bottle     Gram Stain Aerobic Bottle Gram positive cocci in groups    Narrative:      Probable contaminant requires clinical correlation, susceptibility not performed unless requested by physician.        Blood Culture ID, PCR - Blood, Arm, Left [[id number]]  (Abnormal) Collected: 09/06/23 0550    Lab Status: Final result Specimen: Blood from Arm, Left Updated: 09/07/23 0324     BCID, PCR Staph spp, not aureus or lugdunensis. Identification by BCID2 PCR.     BOTTLE TYPE Aerobic Bottle    Urine Culture - Urine, Urine, Clean Catch [[id number]]  (Abnormal)  (Susceptibility) Collected: 09/05/23 2309    Lab Status: Final result Specimen: Urine, Clean Catch Updated: 09/08/23 0112     Urine Culture >100,000 CFU/mL Escherichia coli    Narrative:      Colonization of the urinary tract without infection is common. Treatment is discouraged unless the patient is  symptomatic, pregnant, or undergoing an invasive urologic procedure.    Susceptibility        Escherichia coli      AMELIA      Ampicillin Susceptible      Ampicillin + Sulbactam Susceptible      Cefazolin Susceptible      Cefepime Susceptible      Ceftazidime Susceptible      Ceftriaxone Susceptible      Gentamicin Susceptible      Levofloxacin Susceptible      Nitrofurantoin Susceptible      Piperacillin + Tazobactam Susceptible      Trimethoprim + Sulfamethoxazole Susceptible                                         Radiology:    9/6/23 chest x-ray:  Findings:  There are no airspace consolidations. No pleural fluid. No pneumothorax. The pulmonary vasculature appears within normal limits. The cardiac and mediastinal silhouette appear unremarkable. No acute osseous abnormality identified.   Impression:     Impression:  No acute cardiopulmonary process.       9/6/23 CT abdomen and pelvis without contrast:  Impression:     1. No acute intra-abdominal or intrapelvic process. Circumferential bladder wall thickening is present likely related to chronic cystitis or outlet obstruction, similar as compared to previous studies.   2. Ancillary findings as described above..     I (Dr. Tucker) Independent read the patient's CT abdomen and pelvis with IV contrast that was done today-she does have a new right lower lobe airspace opacity concerning for pneumonia.    Impression:   SIRS criteria with hectic fevers and tachycardia- Suspect this is due to a urinary source.  Also has a new right lower lobe lung opacity.  No obvious intra-abdominal source noted.  She is having some occasional diarrhea but no severe watery diarrhea at this time.  No obvious skin source.  Having some ongoing fevers and leukocytosis is worse today.  E.coli UTI.  Patient has not self cathed in over a year as she urinates on her own now.   Coagulase negative Staph blood cultures contamination. Blood cultures from 9/6 were collected from the same arm but  appeared to have been collected at slightly different times--one set with Staph warneri and other set with Staph hominis. This represents contaminated blood cultures.  She did receive a dose of Daptomycin on 9/9 while awaiting these cultures.  But repeat blood cultures are negative to date. No need to further treat with Daptomycin.   Chronic recurrent Enteroaggregative E.coli positive diarrhea.  Usually self limiting, but likely colonizer.  Acute renal failure/CKD Stage IIIa, improved  H/o Cdiff 3/2022, also on 8/11, PCR was positive but toxin negative indicating colonization.  Type 1 diabetes mellitus, uncontrolled  Chronic Atrial fibrillation/Xarelto  Iron-deficiency anemia  Gastroparesis/gastric stimulator with acute flares.  H/o cerebrovascular accident/TIAs  Ongoing tobacco abuse, just quit about a week ago.  Recurrent prolonged QT, avoid antimicrobials that may prolong QT such as Levaquin, Cipro, azithromycin, fluconazole.  Protein calorie malnutrition  Right lower lobe lung opacity, possible pneumonia-new      PLAN/RECOMMENDATIONS:   Thank you for asking us to see Thelma Michael, I recommend the following:  - Continue to monitor CBC closely  - Vancomycin 125 mg PO BID for Cdiff prophylaxis  - Stop Rocephin and change to Zosyn 3.375 GM IV Q8H for more IA coverage.   - Blood cultures x 2 on 9/7 and 9/9 are NGTD.    - Repeat urinalysis with significant pyuria.  Repeat urine culture in progress  - CT scan of a/p with IV contrast to r/o any IA abscesses. Did not show any new intra-abdominal source for infection.  Possible right lower lobe pneumonia.  - D/w nursing to give patient Ensure if she can tolerate.     Patient continues to have hectic fevers, worsening leukocytosis, and occasional diarrhea.  She appears very frail and ill.     Pedro Tucker MD saw and examined patient, verified hx and PE, read all radiographic studies, reviewed labs and micro data, and formulated dx, plan for treatment and all  medical decision making.      Rene García PA-C for Pedro Tucker MD    I (Dr. Tucker) independently obtained history, performed a physical exam, and formulated the above assessment and plan.  I reviewed the patient's labs, micro, radiographs, and medications today.  I edited the above note to reflect my findings.      Pedro Tucker MD  9/11/2023  19:16 EDT

## 2023-09-11 NOTE — CASE MANAGEMENT/SOCIAL WORK
Continued Stay Note  [facility]     Patient Name: Thelma Michael  MRN: [id number]  Today's Date: 9/11/2023    Admit Date: 9/5/2023    Plan: discharge plan   Discharge Plan       Row Name 09/11/23 1454       Plan    Plan discharge plan    Plan Comments Pt plans to go home at [place]. Per discussion in MDR, pt not medically ready for discharge as she has had a tempt and elevated WBC. ID  following. CM will cont to follow for discharge needs.    Final Discharge Disposition Code 01 - home or self-care                   Discharge Codes    No documentation.                 Expected Discharge Date and Time       Expected Discharge Date Expected Discharge Time    Sep 8, 2023               Nicky Baltazar RN

## 2023-09-12 ENCOUNTER — APPOINTMENT (OUTPATIENT)
Dept: CT IMAGING | Facility: HOSPITAL | Age: 63
DRG: 304 | End: 2023-09-12
Payer: COMMERCIAL

## 2023-09-12 LAB
ALBUMIN SERPL-MCNC: 2.8 G/DL (ref 3.5–5.2)
ALBUMIN/GLOB SERPL: 0.9 G/DL
ALP SERPL-CCNC: 69 U/L (ref 39–117)
ALT SERPL W P-5'-P-CCNC: 5 U/L (ref 1–33)
ANION GAP SERPL CALCULATED.3IONS-SCNC: 9 MMOL/L (ref 5–15)
AST SERPL-CCNC: 9 U/L (ref 1–32)
BACTERIA SPEC AEROBE CULT: NORMAL
BACTERIA SPEC AEROBE CULT: NORMAL
BASOPHILS # BLD AUTO: 0.02 10*3/MM3 (ref 0–0.2)
BASOPHILS NFR BLD AUTO: 0.2 % (ref 0–1.5)
BILIRUB SERPL-MCNC: 0.2 MG/DL (ref 0–1.2)
BUN SERPL-MCNC: 14 MG/DL (ref 8–23)
BUN/CREAT SERPL: 11.6 (ref 7–25)
CALCIUM SPEC-SCNC: 8.4 MG/DL (ref 8.6–10.5)
CHLORIDE SERPL-SCNC: 109 MMOL/L (ref 98–107)
CO2 SERPL-SCNC: 23 MMOL/L (ref 22–29)
CREAT SERPL-MCNC: 1.21 MG/DL (ref 0.57–1)
DEPRECATED RDW RBC AUTO: 45.8 FL (ref 37–54)
EGFRCR SERPLBLD CKD-EPI 2021: 50.2 ML/MIN/1.73
EOSINOPHIL # BLD AUTO: 0.17 10*3/MM3 (ref 0–0.4)
EOSINOPHIL NFR BLD AUTO: 1.7 % (ref 0.3–6.2)
ERYTHROCYTE [DISTWIDTH] IN BLOOD BY AUTOMATED COUNT: 14 % (ref 12.3–15.4)
GLOBULIN UR ELPH-MCNC: 3.1 GM/DL
GLUCOSE BLDC GLUCOMTR-MCNC: 112 MG/DL (ref 70–130)
GLUCOSE BLDC GLUCOMTR-MCNC: 127 MG/DL (ref 70–130)
GLUCOSE BLDC GLUCOMTR-MCNC: 145 MG/DL (ref 70–130)
GLUCOSE BLDC GLUCOMTR-MCNC: 89 MG/DL (ref 70–130)
GLUCOSE SERPL-MCNC: 58 MG/DL (ref 65–99)
HCT VFR BLD AUTO: 30 % (ref 34–46.6)
HGB BLD-MCNC: 9.8 G/DL (ref 12–15.9)
IMM GRANULOCYTES # BLD AUTO: 0.08 10*3/MM3 (ref 0–0.05)
IMM GRANULOCYTES NFR BLD AUTO: 0.8 % (ref 0–0.5)
LYMPHOCYTES # BLD AUTO: 1.53 10*3/MM3 (ref 0.7–3.1)
LYMPHOCYTES NFR BLD AUTO: 15.1 % (ref 19.6–45.3)
MAGNESIUM SERPL-MCNC: 2.1 MG/DL (ref 1.6–2.4)
MCH RBC QN AUTO: 29.3 PG (ref 26.6–33)
MCHC RBC AUTO-ENTMCNC: 32.7 G/DL (ref 31.5–35.7)
MCV RBC AUTO: 89.8 FL (ref 79–97)
MONOCYTES # BLD AUTO: 0.91 10*3/MM3 (ref 0.1–0.9)
MONOCYTES NFR BLD AUTO: 9 % (ref 5–12)
NEUTROPHILS NFR BLD AUTO: 7.43 10*3/MM3 (ref 1.7–7)
NEUTROPHILS NFR BLD AUTO: 73.2 % (ref 42.7–76)
NRBC BLD AUTO-RTO: 0 /100 WBC (ref 0–0.2)
PLATELET # BLD AUTO: 426 10*3/MM3 (ref 140–450)
PMV BLD AUTO: 10.4 FL (ref 6–12)
POTASSIUM SERPL-SCNC: 3.8 MMOL/L (ref 3.5–5.2)
PROT SERPL-MCNC: 5.9 G/DL (ref 6–8.5)
RBC # BLD AUTO: 3.34 10*6/MM3 (ref 3.77–5.28)
SODIUM SERPL-SCNC: 141 MMOL/L (ref 136–145)
WBC NRBC COR # BLD: 10.14 10*3/MM3 (ref 3.4–10.8)

## 2023-09-12 PROCEDURE — 97535 SELF CARE MNGMENT TRAINING: CPT

## 2023-09-12 PROCEDURE — 82948 REAGENT STRIP/BLOOD GLUCOSE: CPT

## 2023-09-12 PROCEDURE — 83735 ASSAY OF MAGNESIUM: CPT | Performed by: FAMILY MEDICINE

## 2023-09-12 PROCEDURE — 25010000002 PIPERACILLIN SOD-TAZOBACTAM PER 1 G: Performed by: PHYSICIAN ASSISTANT

## 2023-09-12 PROCEDURE — 71250 CT THORAX DX C-: CPT

## 2023-09-12 PROCEDURE — 85025 COMPLETE CBC W/AUTO DIFF WBC: CPT | Performed by: FAMILY MEDICINE

## 2023-09-12 PROCEDURE — 97162 PT EVAL MOD COMPLEX 30 MIN: CPT

## 2023-09-12 PROCEDURE — 97166 OT EVAL MOD COMPLEX 45 MIN: CPT

## 2023-09-12 PROCEDURE — 80053 COMPREHEN METABOLIC PANEL: CPT | Performed by: FAMILY MEDICINE

## 2023-09-12 PROCEDURE — 99232 SBSQ HOSP IP/OBS MODERATE 35: CPT | Performed by: INTERNAL MEDICINE

## 2023-09-12 RX ORDER — AMOXICILLIN AND CLAVULANATE POTASSIUM 875; 125 MG/1; MG/1
1 TABLET, FILM COATED ORAL EVERY 12 HOURS SCHEDULED
Status: COMPLETED | OUTPATIENT
Start: 2023-09-12 | End: 2023-09-17

## 2023-09-12 RX ORDER — LOSARTAN POTASSIUM 50 MG/1
50 TABLET ORAL
Status: DISCONTINUED | OUTPATIENT
Start: 2023-09-12 | End: 2023-09-18 | Stop reason: HOSPADM

## 2023-09-12 RX ADMIN — CARVEDILOL 12.5 MG: 12.5 TABLET, FILM COATED ORAL at 16:52

## 2023-09-12 RX ADMIN — VANCOMYCIN HYDROCHLORIDE 125 MG: 125 CAPSULE ORAL at 09:09

## 2023-09-12 RX ADMIN — LIDOCAINE 1 PATCH: 560 PATCH PERCUTANEOUS; TOPICAL; TRANSDERMAL at 09:10

## 2023-09-12 RX ADMIN — IBUPROFEN 400 MG: 400 TABLET, FILM COATED ORAL at 05:37

## 2023-09-12 RX ADMIN — TERAZOSIN HYDROCHLORIDE 1 MG: 1 CAPSULE ORAL at 20:17

## 2023-09-12 RX ADMIN — AMOXICILLIN AND CLAVULANATE POTASSIUM 1 TABLET: 875; 125 TABLET, FILM COATED ORAL at 14:22

## 2023-09-12 RX ADMIN — RIVAROXABAN 15 MG: 15 TABLET, FILM COATED ORAL at 16:53

## 2023-09-12 RX ADMIN — FAMOTIDINE 20 MG: 20 TABLET ORAL at 09:09

## 2023-09-12 RX ADMIN — AMLODIPINE BESYLATE 10 MG: 10 TABLET ORAL at 09:08

## 2023-09-12 RX ADMIN — MULTIVITAMIN TABLET 1 TABLET: TABLET at 09:09

## 2023-09-12 RX ADMIN — Medication 1 CAPSULE: at 09:09

## 2023-09-12 RX ADMIN — LOSARTAN POTASSIUM 50 MG: 50 TABLET, FILM COATED ORAL at 09:09

## 2023-09-12 RX ADMIN — CARVEDILOL 12.5 MG: 12.5 TABLET, FILM COATED ORAL at 09:09

## 2023-09-12 RX ADMIN — FERROUS SULFATE TAB 325 MG (65 MG ELEMENTAL FE) 325 MG: 325 (65 FE) TAB at 09:08

## 2023-09-12 RX ADMIN — VANCOMYCIN HYDROCHLORIDE 125 MG: 125 CAPSULE ORAL at 20:17

## 2023-09-12 RX ADMIN — Medication 5 MG: at 20:17

## 2023-09-12 RX ADMIN — MIRTAZAPINE 7.5 MG: 15 TABLET, FILM COATED ORAL at 20:17

## 2023-09-12 RX ADMIN — AMOXICILLIN AND CLAVULANATE POTASSIUM 1 TABLET: 875; 125 TABLET, FILM COATED ORAL at 21:15

## 2023-09-12 RX ADMIN — HYDRALAZINE HYDROCHLORIDE 50 MG: 50 TABLET, FILM COATED ORAL at 21:15

## 2023-09-12 RX ADMIN — HYDRALAZINE HYDROCHLORIDE 50 MG: 50 TABLET, FILM COATED ORAL at 14:22

## 2023-09-12 RX ADMIN — PIPERACILLIN SODIUM AND TAZOBACTAM SODIUM 3.38 G: 3; .375 INJECTION, SOLUTION INTRAVENOUS at 03:34

## 2023-09-12 RX ADMIN — HYDRALAZINE HYDROCHLORIDE 50 MG: 50 TABLET, FILM COATED ORAL at 05:37

## 2023-09-12 NOTE — THERAPY EVALUATION
Patient Name: Thelma Michael  : 1960    MRN: [id number]                              Today's Date: 2023       Admit Date: 2023    Visit Dx:     ICD-10-CM ICD-9-CM   1. Hypertensive urgency  I16.0 401.9   2. Acute kidney injury  N17.9 584.9   3. Hyperglycemia  R73.9 790.29   4. Acute superficial gastritis without hemorrhage  K29.00 535.40     Patient Active Problem List   Diagnosis    Diabetic peripheral neuropathy    Hyperlipidemia    Hypertension    Osteoporosis    Tobacco use    Heart valve disease    Iron deficiency anemia secondary to inadequate dietary iron intake    Acute kidney injury    DKA (diabetic ketoacidoses)    Hypertensive urgency    Uncontrolled type 1 diabetes mellitus with hyperglycemia    Gastroparesis    PFO (patent foramen ovale)    Atrial fibrillation and flutter    Intractable vomiting    Hypokalemia    Refractory nausea and vomiting    HHS vs mild DKA    CKD (chronic kidney disease) stage 3, GFR 30-59 ml/min    Hypertensive urgency    Urinary retention    Family history of transient ischemic attacks    Type 1 diabetes mellitus with hypoglycemia with coma    Acute UTI (urinary tract infection)    Nausea vomiting and diarrhea    PAF (paroxysmal atrial fibrillation)    Anxiety associated with depression    Clavicle fracture     Past Medical History:   Diagnosis Date    Acid reflux     Acute bronchitis     Cardiac murmur     Depression with anxiety 10/5/2020    Diabetes mellitus     Gastroesophageal reflux disease 2016    H/O echocardiogram 2012    i. LVEF 65%.ii. Mild LVH.iii. Borderline evidence of atrial septal aneurysm.  No PFO.     History of nuclear stress test 2014    Negative for ischemia and scars; LVEF 77%.      History of TIAs 7/15/2021    Hyperlipidemia     Hypertension     Impacted cerumen of both ears     Migraine     Migraines 10/31/2019    Self-catheterizes urinary bladder     Sinusitis     Stroke     Tobacco abuse     quit 4 days ago.       Urticaria     Vitamin D deficiency 5/13/2016     Past Surgical History:   Procedure Laterality Date    CAPSULE ENDOSCOPY  07/27/2021    Procedure: PILLCAM DEPLOYMENT;  Surgeon: Mikael Worthy MD;  Location:  [facility];  Service: Gastroenterology;;    COLONOSCOPY      COLONOSCOPY N/A 07/27/2021    Procedure: COLONOSCOPY;  Surgeon: Mikael Worthy MD;  Location:  [facility];  Service: Gastroenterology;  Laterality: N/A;    DENTAL PROCEDURE      ENDOSCOPY N/A 06/30/2021    Procedure: ESOPHAGOGASTRODUODENOSCOPY;  Surgeon: Brunner, Mark I, MD;  Location:  [facility];  Service: Gastroenterology;  Laterality: N/A;    ENDOSCOPY N/A 07/27/2021    Procedure: ESOPHAGOGASTRODUODENOSCOPY;  Surgeon: Mikael Worthy MD;  Location:  [facility];  Service: Gastroenterology;  Laterality: N/A;    ENDOSCOPY WITH JTUBE N/A 03/16/2022    Procedure: ESOPHAGOGASTRODUODENOSCOPY WITH JEJUNAL TUBE INSERTION;  Surgeon: Thom Glover MD;  Location:  [facility];  Service: Gastroenterology;  Laterality: N/A;    UPPER GASTROINTESTINAL ENDOSCOPY        General Information       Row Name 09/12/23 1517          Physical Therapy Time and Intention    Document Type evaluation  -KW     Mode of Treatment physical therapy  -       Row Name 09/12/23 1517          General Information    Patient Profile Reviewed yes  -KW     Existing Precautions/Restrictions fall;other (see comments)  -KW     Barriers to Rehab medically complex  -KW       Row Name 09/12/23 1517          Living Environment    People in Home child(bronson), adult  -KW       Row Name 09/12/23 1517          Home Main Entrance    Number of Stairs, Main Entrance --  2nd level apt  -KW       Row Name 09/12/23 1517          Stairs Within Home, Primary    Number of Stairs, Within Home, Primary none  -KW       Row Name 09/12/23 1517          Cognition    Orientation Status (Cognition) oriented x 3  -KW       Row Name 09/12/23 1517          Safety Issues,  Functional Mobility    Impairments Affecting Function (Mobility) balance;endurance/activity tolerance;postural/trunk control;strength;pain  -KW               User Key  (r) = Recorded By, (t) = Taken By, (c) = Cosigned By      Initials Name Provider Type    Josie Gallagher PT Physical Therapist                   Mobility       Row Name 09/12/23 0920          Bed Mobility    Bed Mobility supine-sit;sit-supine  -KW     Supine-Sit [place] (Bed Mobility) maximum assist (25% patient effort);1 person assist;verbal cues  -KW     Sit-Supine [place] (Bed Mobility) maximum assist (25% patient effort);verbal cues;1 person assist  -KW     Assistive Device (Bed Mobility) bed rails;draw sheet;head of bed elevated  -KW       Row Name 09/12/23 0920          Sit-Stand Transfer    Sit-Stand [place] (Transfers) moderate assist (50% patient effort);verbal cues  -KW     Assistive Device (Sit-Stand Transfers) --  R UE support  -KW               User Key  (r) = Recorded By, (t) = Taken By, (c) = Cosigned By      Initials Name Provider Type    Josie Gallagher PT Physical Therapist                   Obj/Interventions       Row Name 09/12/23 0920          Strength Comprehensive (MMT)    General Manual Muscle Testing (MMT) Assessment lower extremity strength deficits identified  -KW       Row Name 09/12/23 0920          Balance    Balance Assessment sitting static balance;sitting dynamic balance;sit to stand dynamic balance;standing static balance;standing dynamic balance  -KW     Static Sitting Balance contact guard  -KW     Dynamic Sitting Balance minimal assist  -KW     Position, Sitting Balance unsupported;sitting edge of bed  -KW     Sit to Stand Dynamic Balance moderate assist  -KW     Static Standing Balance minimal assist  -KW     Dynamic Standing Balance moderate assist  -KW     Position/Device Used, Standing Balance supported  -KW     Balance Interventions sitting;standing;sit to stand;supported  -KW                User Key  (r) = Recorded By, (t) = Taken By, (c) = Cosigned By      Initials Name Provider Type    Josie Gallagher PT Physical Therapist                   Goals/Plan       Row Name 09/12/23 0920          Bed Mobility Goal 1 (PT)    Activity/Assistive Device (Bed Mobility Goal 1, PT) sit to supine/supine to sit  -KW     [place] Level/Cues Needed (Bed Mobility Goal 1, PT) supervision required  -KW     Time Frame (Bed Mobility Goal 1, PT) 10 days  -KW       Row Name 09/12/23 0920          Transfer Goal 1 (PT)    Activity/Assistive Device (Transfer Goal 1, PT) sit-to-stand/stand-to-sit;bed-to-chair/chair-to-bed  -KW     [place] Level/Cues Needed (Transfer Goal 1, PT) contact guard required  -KW     Time Frame (Transfer Goal 1, PT) 10 days  -KW       Row Name 09/12/23 0920          Gait Training Goal 1 (PT)    Activity/Assistive Device (Gait Training Goal 1, PT) gait (walking locomotion);decrease fall risk;assistive device use;diminish gait deviation;improve balance and speed;increase endurance/gait distance  -KW     [place] Level (Gait Training Goal 1, PT) contact guard required  -KW     Distance (Gait Training Goal 1, PT) 50  -KW     Time Frame (Gait Training Goal 1, PT) 10 days  -KW               User Key  (r) = Recorded By, (t) = Taken By, (c) = Cosigned By      Initials Name Provider Type    Josie Gallagher PT Physical Therapist                   Clinical Impression       Row Name 09/12/23 0920          Pain    Pretreatment Pain Rating 0/10 - no pain  -KW       Row Name 09/12/23 0920          Plan of Care Review    Plan of Care Reviewed With patient  -KW     Progress no change  -KW     Outcome Evaluation PT eval completed. Patient extremely lethargic this date. She sat at EOB with supervision- Bhavesh and required assistance to stand. She attempted side steps along EOB requiring mod-maxA with steps. patient declined chair transfer and requested to return to supine in bed.  Patient presents below baseline for functional mobility. Patient demonstrates decreased activity tolerance, deconditioning and reduced dynamic balance. Patient would continue to benefit from skilled PT services to improve dynamic balance with gait, transfers strength, and activity tolerance training in order to build endurance and reduce risk of falls.  -KW       Row Name 09/12/23 0920          Therapy Assessment/Plan (PT)    Rehab Potential (PT) good, to achieve stated therapy goals  -KW     Criteria for Skilled Interventions Met (PT) yes;skilled treatment is necessary  -KW     Therapy Frequency (PT) daily  -KW       Row Name 09/12/23 0920          Vital Signs    Pre Systolic BP Rehab 127  -KW     Pre Treatment Diastolic BP 68  -KW     Pretreatment Heart Rate (beats/min) 71  -KW     Pre SpO2 (%) 100  -KW       Row Name 09/12/23 0920          Positioning and Restraints    Pre-Treatment Position in bed  -KW     Post Treatment Position bed  -KW     In Bed supine;call light within reach;encouraged to call for assist;with nsg  -KW               User Key  (r) = Recorded By, (t) = Taken By, (c) = Cosigned By      Initials Name Provider Type    KW Josie Rangel, PT Physical Therapist                   Outcome Measures       Row Name 09/12/23 0920          How much help from another person do you currently need...    Turning from your back to your side while in flat bed without using bedrails? 2  -KW     Moving from lying on back to sitting on the side of a flat bed without bedrails? 2  -KW     Moving to and from a bed to a chair (including a wheelchair)? 2  -KW     Standing up from a chair using your arms (e.g., wheelchair, bedside chair)? 2  -KW     Climbing 3-5 steps with a railing? 1  -KW     To walk in hospital room? 2  -KW     AM-PAC 6 Clicks Score (PT) 11  -KW     Highest level of mobility 4 --> Transferred to chair/commode  -KW       Row Name 09/12/23 1419 09/12/23 0920       Functional Assessment    Outcome  Measure Options AM-PAC 6 Clicks Daily Activity (OT)  - AM-PAC 6 Clicks Basic Mobility (PT)  -MALLORIE              User Key  (r) = Recorded By, (t) = Taken By, (c) = Cosigned By      Initials Name Provider Type    Julieta Rooney, OT Occupational Therapist    Josie Gallagher, PT Physical Therapist                                 Physical Therapy Education       Title: PT OT SLP Therapies (In Progress)       Topic: Physical Therapy (Not Started)       Point: Mobility training (Not Started)       Learner Progress:  Not documented in this visit.              Point: Home exercise program (Not Started)       Learner Progress:  Not documented in this visit.              Point: Body mechanics (Not Started)       Learner Progress:  Not documented in this visit.              Point: Precautions (Not Started)       Learner Progress:  Not documented in this visit.                                  PT Recommendation and Plan     Plan of Care Reviewed With: patient  Progress: no change  Outcome Evaluation: PT eval completed. Patient extremely lethargic this date. She sat at EOB with supervision- Bhavesh and required assistance to stand. She attempted side steps along EOB requiring mod-maxA with steps. patient declined chair transfer and requested to return to supine in bed. Patient presents below baseline for functional mobility. Patient demonstrates decreased activity tolerance, deconditioning and reduced dynamic balance. Patient would continue to benefit from skilled PT services to improve dynamic balance with gait, transfers strength, and activity tolerance training in order to build endurance and reduce risk of falls.     Time Calculation:   PT Evaluation Complexity  History, PT Evaluation Complexity: 3 or more personal factors and/or comorbidities  Examination of Body Systems (PT Eval Complexity): total of 3 or more elements  Clinical Presentation (PT Evaluation Complexity): evolving  Clinical Decision Making (PT  Evaluation Complexity): moderate complexity  Overall Complexity (PT Evaluation Complexity): moderate complexity     PT Charges       Row Name 09/12/23 0920             Time Calculation    Start Time 0920  -KW      PT Received On 09/12/23  -KW      PT Goal Re-Cert Due Date 09/22/23  -KW         Untimed Charges    PT Eval/Re-eval Minutes 59  -KW         Total Minutes    Untimed Charges Total Minutes 59  -KW       Total Minutes 59  -KW                User Key  (r) = Recorded By, (t) = Taken By, (c) = Cosigned By      Initials Name Provider Type    Josie Gallagher, [facility] Physical Therapist                  Therapy Charges for Today       Code Description Service Date Service Provider Modifiers Qty    [id number] HC PT EVAL MOD COMPLEXITY 4 9/12/2023 Josie Rangel, [facility] GP 1            PT G-Codes  Outcome Measure Options: AM-PAC 6 Clicks Daily Activity (OT)  AM-PAC 6 Clicks Score (PT): 11  AM-PAC 6 Clicks Score (OT): 12  PT Discharge Summary  Anticipated Discharge Disposition (PT): inpatient rehabilitation facility    Josie Rangel PT  9/12/2023

## 2023-09-12 NOTE — PLAN OF CARE
Goal Outcome Evaluation:  Plan of Care Reviewed With: patient           Outcome Evaluation: Pt presents w/ increased back pain, generalized weakness, decreased functional endurance, and balance deficits limiting her ADL independence. Pt would benefit from continued skilled IPOT services to address current functional deficits. Rec IRF at d/c.      Anticipated Discharge Disposition (OT): inpatient rehabilitation facility

## 2023-09-12 NOTE — PROGRESS NOTES
[facility] Medicine Services  PROGRESS NOTE    Patient Name: Thelma Michael  : 1960  MRN: [id number]    Date of Admission: 2023  Primary Care Physician: Monet Howe APRN    Subjective   Subjective     CC: Follow-up abdominal pain, n/v/d, fever    HPI: Patient did lose IV access overnight, this morning she continues to complain of generalized weakness/lethargy, nausea has decreased p.o. intake      Objective   Objective     Vital Signs:   Temp:  [98.5 °F (36.9 °C)-101 °F (38.3 °C)] 99.2 °F (37.3 °C)  Heart Rate:  [70-85] 78  Resp:  [16] 16  BP: (121-156)/(67-89) 155/80     Physical Exam:  Constitutional: Chronically ill-appearing female, lethargic  HENT: NCAT, mucous membranes moist  Respiratory: Clear to auscultation bilaterally, respiratory effort normal   Cardiovascular: RRR, no murmurs, rubs, or gallops  Gastrointestinal: Positive bowel sounds, soft, nontender, nondistended  Musculoskeletal: No bilateral ankle edema  Psychiatric: Depressed mood  Neurologic: Nonfocal  Skin: No rashes     Results Reviewed:  LAB RESULTS:      Lab 23  0349 23  1602 23  0412 09/10/23  0343 23  1747 23  1626 23  0859 23  0610 23  2320   WBC 10.14  --  13.98* 6.77  --  6.47 4.84  --  7.29   HEMOGLOBIN 9.8*  --  11.6* 10.4*  --  11.6* 11.5*  --  13.0   HEMATOCRIT 30.0*  --  35.1 32.4*  --  36.3 34.6  --  39.1   PLATELETS 426  --  417 306  --  337 349  --  404   NEUTROS ABS 7.43*  --  11.51* 3.68  --  3.88 2.73  --  6.14   IMMATURE GRANS (ABS) 0.08*  --  0.06* 0.04  --  0.02 0.05  --  0.02   LYMPHS ABS 1.53  --  1.40 2.27  --  1.89 1.23  --  0.83   MONOS ABS 0.91*  --  0.96* 0.73  --  0.67 0.82  --  0.29   EOS ABS 0.17  --  0.02 0.02  --  0.00 0.00  --  0.00   MCV 89.8  --  88.6 90.8  --  90.1 88.5  --  86.9   PROCALCITONIN  --  0.15  --   --   --  0.15  --   --  0.17   LACTATE  --  1.5  --   --  1.0 0.7  --  1.0  --          Lab  09/11/23  0412 09/10/23  1545 09/10/23  0343 09/08/23  1626 09/08/23  0511 09/07/23  1612 09/07/23  0436 09/06/23  0901 09/06/23  0859   SODIUM 143  --  141 145 141  --  143   < >  --    POTASSIUM 4.1 5.2 3.4* 3.9 3.9   < > 3.4*   < >  --    CHLORIDE 112*  --  111* 110* 108*  --  111*   < >  --    CO2 22.0  --  22.0 20.0* 19.0*  --  21.0*   < >  --    ANION GAP 9.0  --  8.0 15.0 14.0  --  11.0   < >  --    BUN 15  --  25* 17 17  --  20   < >  --    CREATININE 1.19*  --  1.50* 1.30* 1.27*  --  1.26*   < >  --    EGFR 51.2*  --  38.8* 46.0* 47.3*  --  47.8*   < >  --    GLUCOSE 93  --  76 127* 124*  --  58*   < >  --    CALCIUM 8.8  --  8.2* 8.6 8.5*  --  8.3*   < >  --    MAGNESIUM 1.9  --  2.1 1.8  --   --  2.0  --   --    PHOSPHORUS  --   --   --   --   --   --  2.6  --   --    HEMOGLOBIN A1C  --   --   --   --   --   --   --   --  11.10*    < > = values in this interval not displayed.         Lab 09/11/23  0412 09/10/23  0343 09/08/23  1626 09/05/23  2320   TOTAL PROTEIN 6.6 5.7* 6.5 8.2   ALBUMIN 3.2* 2.7* 3.2* 4.0   GLOBULIN 3.4 3.0 3.3 4.2   ALT (SGPT) 7 7 10 13   AST (SGOT) 13 11 14 36*   BILIRUBIN 0.3 0.3 0.4 0.5   ALK PHOS 79 62 77 111   LIPASE  --   --   --  26                     Lab 09/06/23  0426   PH, ARTERIAL 7.436   PCO2, ARTERIAL 32.2*   PO2 ART 83.3   FIO2 21   HCO3 ART 21.7   BASE EXCESS ART -1.9*   CARBOXYHEMOGLOBIN 0.9     Brief Urine Lab Results  (Last result in the past 365 days)        Color   Clarity   Blood   Leuk Est   Nitrite   Protein   CREAT   Urine HCG        09/11/23 1536 Yellow   Turbid   Large (3+)   Large (3+)   Negative   100 mg/dL (2+)                   Microbiology Results Abnormal       Procedure Component Value - Date/Time    Blood Culture - Blood, Hand, Left [[id number]]  (Normal) Collected: 09/09/23 1747    Lab Status: Preliminary result Specimen: Blood from Hand, Left Updated: 09/11/23 1845     Blood Culture No growth at 2 days    Blood Culture - Blood, Hand, Right  [[id number]]  (Normal) Collected: 09/09/23 1751    Lab Status: Preliminary result Specimen: Blood from Hand, Right Updated: 09/11/23 1845     Blood Culture No growth at 2 days    Blood Culture - Blood, Hand, Left [[id number]]  (Normal) Collected: 09/07/23 0752    Lab Status: Preliminary result Specimen: Blood from Hand, Left Updated: 09/11/23 0815     Blood Culture No growth at 4 days    Narrative:      Aerobic bottle only      Blood Culture - Blood, Hand, Right [[id number]]  (Normal) Collected: 09/07/23 0750    Lab Status: Preliminary result Specimen: Blood from Hand, Right Updated: 09/11/23 0815     Blood Culture No growth at 4 days    Respiratory Panel PCR w/COVID-19(SARS-CoV-2) [facility]MA/JUAN ALBERTO/EDY/PAD/[facility]ALINA In-House, NP Swab in UTM/VTM, 3-4 HR TAT - Swab, Nasopharynx [[id number]]  (Normal) Collected: 09/08/23 1714    Lab Status: Final result Specimen: Swab from Nasopharynx Updated: 09/08/23 1818     ADENOVIRUS, PCR Not Detected     Coronavirus 229E Not Detected     Coronavirus HKU1 Not Detected     Coronavirus NL63 Not Detected     Coronavirus OC43 Not Detected     COVID19 Not Detected     Human Metapneumovirus Not Detected     Human Rhinovirus/Enterovirus Not Detected     Influenza A PCR Not Detected     Influenza B PCR Not Detected     Parainfluenza Virus 1 Not Detected     Parainfluenza Virus 2 Not Detected     Parainfluenza Virus 3 Not Detected     Parainfluenza Virus 4 Not Detected     RSV, PCR Not Detected     Bordetella pertussis pcr Not Detected     Bordetella parapertussis PCR Not Detected     Chlamydophila pneumoniae PCR Not Detected     Mycoplasma pneumo by PCR Not Detected    Narrative:      In the setting of a positive respiratory panel with a viral infection PLUS a negative procalcitonin without other underlying concern for bacterial infection, consider observing off antibiotics or discontinuation of antibiotics and continue supportive care. If the respiratory panel is positive for atypical bacterial  infection (Bordetella pertussis, Chlamydophila pneumoniae, or Mycoplasma pneumoniae), consider antibiotic de-escalation to target atypical bacterial infection.    Respiratory Panel PCR w/COVID-19(SARS-CoV-2) [facility]/JUAN ALBERTO/EDY/[facility] In-House, NP Swab in UTM/VTM, 3-4 HR TAT - Swab, Nasopharynx [[id number]]  (Normal) Collected: 09/06/23 0620    Lab Status: Final result Specimen: Swab from Nasopharynx Updated: 09/06/23 0744     ADENOVIRUS, PCR Not Detected     Coronavirus 229E Not Detected     Coronavirus HKU1 Not Detected     Coronavirus NL63 Not Detected     Coronavirus OC43 Not Detected     COVID19 Not Detected     Human Metapneumovirus Not Detected     Human Rhinovirus/Enterovirus Not Detected     Influenza A PCR Not Detected     Influenza B PCR Not Detected     Parainfluenza Virus 1 Not Detected     Parainfluenza Virus 2 Not Detected     Parainfluenza Virus 3 Not Detected     Parainfluenza Virus 4 Not Detected     RSV, PCR Not Detected     Bordetella pertussis pcr Not Detected     Bordetella parapertussis PCR Not Detected     Chlamydophila pneumoniae PCR Not Detected     Mycoplasma pneumo by PCR Not Detected    Narrative:      In the setting of a positive respiratory panel with a viral infection PLUS a negative procalcitonin without other underlying concern for bacterial infection, consider observing off antibiotics or discontinuation of antibiotics and continue supportive care. If the respiratory panel is positive for atypical bacterial infection (Bordetella pertussis, Chlamydophila pneumoniae, or Mycoplasma pneumoniae), consider antibiotic de-escalation to target atypical bacterial infection.            CT Abdomen Pelvis With Contrast    Result Date: 9/11/2023  CT ABDOMEN PELVIS W CONTRAST Date of Exam: 9/11/2023 10:45 AM EDT Indication: Abdominal pain, diarrhea. Comparison: 9/6/2023 Technique: Axial CT images were obtained of the abdomen and pelvis following the uneventful intravenous administration of 75 mL  [id number]-300. Reconstructed coronal and sagittal images were also obtained. Automated exposure control and iterative construction methods were used. Findings: Lung Bases:  There is interval development of focal peripheral nodular airspace disease in the right lower lobe compatible with pneumonia versus nodular atelectasis. Again noticed a gastric stimulator device. Liver:Liver is normal in size and CT density. No focal lesions. Biliary/Gallbladder: The gallbladder is without evidence of radiopaque stones. The biliary tree is nondilated. Spleen:Spleen is normal in size and CT density. Pancreas: Pancreas shows homogeneous density. There is no evidence of pancreatic mass or peripancreatic fluid. Kidneys: Kidneys are normal in size. There are no stones or hydronephrosis. Few tiny left renal cysts Adrenals: Stable diffuse thickening of the adrenal glands compatible with adrenal hyperplasia Retroperitoneal/Lymph Nodes/Vasculature: No retroperitoneal adenopathy is identified by size criteria. Gastrointestinal/Mesentery: The bowel loops are non-dilated without definite wall thickening or mass. The appendix appears within normal limits. No evidence of obstruction. No free air. Air-fluid levels throughout the large bowel with no associated wall thickening compatible with diarrhea Bladder: The bladder is better distended than on the previous examination with mild persistent thickening of the roof of the bladder wall which may represent chronic cystitis. There are no intraluminal calcifications   Bony Structures:  Visualized bony structures are consistent with the patient's age.     Impression: Impression: 1. Nondilated large bowel with air-fluid levels and no wall thickening in keeping with the patient's history of diarrhea 2. Interval development of a peripheral nodular density in the right lower lobe may represent nodular atelectasis or pneumonia. Clinical relation and close follow-up recommended with CT chest 3. Additional  stable nonemergent findings as above Electronically Signed: Quinton Juarez MD  9/11/2023 12:12 PM EDT  Workstation ID: [id number]     Results for orders placed during the hospital encounter of 12/06/22    Adult Transthoracic Echo Complete W/ Cont if Necessary Per Protocol    Interpretation Summary    Left ventricular ejection fraction appears to be greater than 70%.    Left ventricular wall thickness is consistent with moderate concentric hypertrophy.    Left ventricular diastolic function is consistent with (grade I) impaired relaxation.    Estimated right ventricular systolic pressure from tricuspid regurgitation is mildly elevated (35-45 mmHg). Calculated right ventricular systolic pressure from tricuspid regurgitation is 39 mmHg.      Current medications:  Scheduled Meds:amLODIPine, 10 mg, Oral, Q24H  carvedilol, 12.5 mg, Oral, BID With Meals  famotidine, 20 mg, Oral, QAM  ferrous sulfate, 325 mg, Oral, Daily With Breakfast  hydrALAZINE, 50 mg, Oral, Q8H  ibuprofen, 400 mg, Oral, Q6H  insulin detemir, 10 Units, Subcutaneous, Daily  insulin lispro, 2-7 Units, Subcutaneous, 4x Daily AC & at Bedtime  lactobacillus acidophilus, 1 capsule, Oral, Daily  Lidocaine, 1 patch, Transdermal, Q24H  mirtazapine, 7.5 mg, Oral, Nightly  multivitamin, 1 tablet, Oral, Daily  piperacillin-tazobactam, 3.375 g, Intravenous, Q8H  rivaroxaban, 15 mg, Oral, Daily With Dinner  sodium chloride, 10 mL, Intravenous, Q12H  terazosin, 1 mg, Oral, Nightly  vancomycin, 125 mg, Oral, BID      Continuous Infusions:   PRN Meds:.  acetaminophen    dextrose    dextrose    glucagon (human recombinant)    hydrALAZINE    melatonin    ondansetron **OR** ondansetron    oxyCODONE-acetaminophen    Potassium Replacement - Follow Nurse / BPA Driven Protocol    prochlorperazine **OR** prochlorperazine **OR** prochlorperazine    [COMPLETED] Insert Peripheral IV **AND** sodium chloride    sodium chloride    sodium chloride    Assessment & Plan   Assessment &  Plan     Active Hospital Problems    Diagnosis  POA    **Hypertensive urgency [I16.0]  Yes    Acute UTI (urinary tract infection) [N39.0]  Yes    Nausea vomiting and diarrhea [R11.2, R19.7]  Yes    PAF (paroxysmal atrial fibrillation) [I48.0]  Yes    Anxiety associated with depression [F41.8]  Yes    Clavicle fracture [S42.009A]  Yes    CKD (chronic kidney disease) stage 3, GFR 30-59 ml/min [N18.30]  Yes    Hypokalemia [E87.6]  Yes    Gastroparesis [K31.84]  Yes    Uncontrolled type 1 diabetes mellitus with hyperglycemia [E10.65]  Yes    Hyperlipidemia [E78.5]  Yes    Hypertension [I10]  Yes      Resolved Hospital Problems   No resolved problems to display.        Brief Hospital Course to date:  Thelma Michael is a 62 y.o. female w/ a hx of poorly controlled type 1 diabetes, Afib on Xarelto, iron deficiency anemia, gastroparesis s/p gastric stimulator, HTN, HLD, migraines, chronic urinary retention (previously performed self-catheterization), strokes (chronic right lentiform nucleus and left cerebellar lacunar infarcts found on MRI in February of 2023), tobacco abuse and GERD who presented to the emergency room with complaints of nausea, vomiting and diarrhea. Patient has had issues with fevers since the evening of 9/8, repeat blood cultures have been NGTD and no obvious new symptoms.      This patient's problems and plans were partially entered by my partner and updated as appropriate by me 09/12/23.     Positive blood cultures 2/2  Abdominal pain, Sepsis   Fever  - suspected contaminate, Vancomycin then stopped,-repeat BC obtained NGTD  - ID following, plans for again repeat blood cultures on 9/9, currently negative   -- patient continues to have abdominal pain, and intermittent fevers, repeat CT abd/pelvis is negative for acute findings of abdomen, CT chest has been ordered and pending  -- Previous procal and LA negative, will order repeat at this time as patient continues to have repeat fevers overnight    -- ID following antibiotics changed to IV Zosyn t per ID, patient does not have IV access, will discuss with ID if antibiotics could be changed to p.o., if not patient will need have central line placed     hypertensive urgency   History of hypertension  -She is status post Cardene gtt. BP much improved  -Continue p.o. hydralazine, Coreg, Norvasc, terazosin  -Resume home losartan renal function is stable     Poorly controlled type 1 diabetes A1c 11.1%   -FSBG's reviewed yesterday with hyperglycemia, currently much improved   -Continue basal and SSI, adjust as warranted  -diabetic educator consult       Nausea, vomiting, diarrhea   Hx of gastroparesis   -CT abd/pel without acute findings other than bladder wall thickening c/w chronic cystitis   --GI PCR + Enteroaggregative E.coli -- patient now with fever since the evening of 9/8, ID consult has been requested, continue to follow.   - Continued supportive care at this time     Acute UTI   Hx of neurogenic bladder (previously self-cath'd)  -UA w/ 4+ bacteria, 31-50 WBCs   -urine culture > 100k GNB  -previous urine culture + for enterococcus faecalis in 4/23 (previous results w/ mixed sameera)  -Antibiotics as above per ID  -bladder scan q 4      Hypokalemia   Continue to monitor and replete per protocol     CKD Stage III  -Renal function seems to be stable and within her baseline  -s/p IV fluids  -monitor closely with N/V/D and minimal oral intake      PAF   -continue coreg, Xarelto       Expected Discharge Location and Transportation: Home  Expected Discharge   Expected Discharge Date: 9/8/2023; Expected Discharge Time:      DVT prophylaxis:  Medical DVT prophylaxis orders are present.          CODE STATUS:   Code Status and Medical Interventions:   Ordered at: 09/06/23 0529     Code Status (Patient has no pulse and is not breathing):    CPR (Attempt to Resuscitate)     Medical Interventions (Patient has pulse or is breathing):    Full Support       Chris Medrano  MD  09/12/23

## 2023-09-12 NOTE — PLAN OF CARE
Goal Outcome Evaluation:  Plan of Care Reviewed With: patient        Progress: no change  Outcome Evaluation: PT eval completed. Patient extremely lethargic this date. She sat at EOB with supervision- Bhavesh and required assistance to stand. She attempted side steps along EOB requiring mod-maxA with steps. patient declined chair transfer and requested to return to supine in bed. Patient presents below baseline for functional mobility. Patient demonstrates decreased activity tolerance, deconditioning and reduced dynamic balance. Patient would continue to benefit from skilled PT services to improve dynamic balance with gait, transfers strength, and activity tolerance training in order to build endurance and reduce risk of falls.      Anticipated Discharge Disposition (PT): inpatient rehabilitation facility

## 2023-09-12 NOTE — THERAPY EVALUATION
Patient Name: Thelma Michael  : 1960    MRN: [id number]                              Today's Date: 2023       Admit Date: 2023    Visit Dx:     ICD-10-CM ICD-9-CM   1. Hypertensive urgency  I16.0 401.9   2. Acute kidney injury  N17.9 584.9   3. Hyperglycemia  R73.9 790.29   4. Acute superficial gastritis without hemorrhage  K29.00 535.40     Patient Active Problem List   Diagnosis    Diabetic peripheral neuropathy    Hyperlipidemia    Hypertension    Osteoporosis    Tobacco use    Heart valve disease    Iron deficiency anemia secondary to inadequate dietary iron intake    Acute kidney injury    DKA (diabetic ketoacidoses)    Hypertensive urgency    Uncontrolled type 1 diabetes mellitus with hyperglycemia    Gastroparesis    PFO (patent foramen ovale)    Atrial fibrillation and flutter    Intractable vomiting    Hypokalemia    Refractory nausea and vomiting    HHS vs mild DKA    CKD (chronic kidney disease) stage 3, GFR 30-59 ml/min    Hypertensive urgency    Urinary retention    Family history of transient ischemic attacks    Type 1 diabetes mellitus with hypoglycemia with coma    Acute UTI (urinary tract infection)    Nausea vomiting and diarrhea    PAF (paroxysmal atrial fibrillation)    Anxiety associated with depression    Clavicle fracture     Past Medical History:   Diagnosis Date    Acid reflux     Acute bronchitis     Cardiac murmur     Depression with anxiety 10/5/2020    Diabetes mellitus     Gastroesophageal reflux disease 2016    H/O echocardiogram 2012    i. LVEF 65%.ii. Mild LVH.iii. Borderline evidence of atrial septal aneurysm.  No PFO.     History of nuclear stress test 2014    Negative for ischemia and scars; LVEF 77%.      History of TIAs 7/15/2021    Hyperlipidemia     Hypertension     Impacted cerumen of both ears     Migraine     Migraines 10/31/2019    Self-catheterizes urinary bladder     Sinusitis     Stroke     Tobacco abuse     quit 4 days ago.       Urticaria     Vitamin D deficiency 5/13/2016     Past Surgical History:   Procedure Laterality Date    CAPSULE ENDOSCOPY  07/27/2021    Procedure: PILLCAM DEPLOYMENT;  Surgeon: Mikael Worthy MD;  Location:  [facility];  Service: Gastroenterology;;    COLONOSCOPY      COLONOSCOPY N/A 07/27/2021    Procedure: COLONOSCOPY;  Surgeon: Mikael Worthy MD;  Location:  [facility];  Service: Gastroenterology;  Laterality: N/A;    DENTAL PROCEDURE      ENDOSCOPY N/A 06/30/2021    Procedure: ESOPHAGOGASTRODUODENOSCOPY;  Surgeon: Brunner, Mark I, MD;  Location:  [facility];  Service: Gastroenterology;  Laterality: N/A;    ENDOSCOPY N/A 07/27/2021    Procedure: ESOPHAGOGASTRODUODENOSCOPY;  Surgeon: Mikael Worthy MD;  Location:  [facility];  Service: Gastroenterology;  Laterality: N/A;    ENDOSCOPY WITH JTUBE N/A 03/16/2022    Procedure: ESOPHAGOGASTRODUODENOSCOPY WITH JEJUNAL TUBE INSERTION;  Surgeon: Thom Glover MD;  Location:  [facility];  Service: Gastroenterology;  Laterality: N/A;    UPPER GASTROINTESTINAL ENDOSCOPY        General Information       Row Name 09/12/23 1411          OT Time and Intention    Document Type evaluation  -     Mode of Treatment occupational therapy  -       Row Name 09/12/23 1411          General Information    Patient Profile Reviewed yes  -     Prior Level of Function independent:;bed mobility;transfer;all household mobility;community mobility;ADL's  Pt has RW & SPC, unclear if she uses at baseline. Pt limited historian, TBA further. Pt admits to 3 recent falls at home.  -     Existing Precautions/Restrictions fall;other (see comments)  L clavicle fx w/ LUE sling in room for OOB activity, increased lethargy  -     Barriers to Rehab medically complex;previous functional deficit  -       Row Name 09/12/23 1411          Living Environment    People in Home child(bronson), adult  son  -       Row Name 09/12/23 1411          Home Main Entrance     Number of Stairs, Main Entrance four  -       Row Name 09/12/23 1411          Stairs Within Home, Primary    Number of Stairs, Within Home, Primary none  -       Row Name 09/12/23 1411          Cognition    Orientation Status (Cognition) oriented x 3  -[facility] Name 09/12/23 1411          Safety Issues, Functional Mobility    Safety Issues Affecting Function (Mobility) awareness of need for assistance;insight into deficits/self-awareness;safety precaution awareness;safety precautions follow-through/compliance;sequencing abilities  -     Impairments Affecting Function (Mobility) balance;endurance/activity tolerance;postural/trunk control;strength;pain  -               User Key  (r) = Recorded By, (t) = Taken By, (c) = Cosigned By      Initials Name Provider Type     Julieta Mayfield OT Occupational Therapist                     Mobility/ADL's       Row Name 09/12/23 1414          Bed Mobility    Bed Mobility supine-sit  -     Supine-Sit [place] (Bed Mobility) maximum assist (25% patient effort);1 person assist;verbal cues  -     Bed Mobility, Safety Issues decreased use of arms for pushing/pulling;decreased use of legs for bridging/pushing;impaired trunk control for bed mobility  -     Assistive Device (Bed Mobility) bed rails;draw sheet;head of bed elevated  -[facility] Name 09/12/23 1414          Transfers    Transfers sit-stand transfer;stand-sit transfer;bed-chair transfer  -[facility] Name 09/12/23 1414          Bed-Chair Transfer    Bed-Chair [place] (Transfers) maximum assist (25% patient effort);1 person assist;verbal cues  [facility]     Assistive Device (Bed-Chair Transfers) other (see comments)  gait belt, B knee block req initially upon standing  -[facility] Name 09/12/23 1414          Sit-Stand Transfer    Sit-Stand [place] (Transfers) maximum assist (25% patient effort);1 person assist;verbal cues  [facility]     Assistive Device (Sit-Stand Transfers) other (see  comments)  -[facility] Name 09/12/23 1414          Stand-Sit Transfer    Stand-Sit [place] (Transfers) maximum assist (25% patient effort);1 person assist;verbal cues  -     Assistive Device (Stand-Sit Transfers) other (see comments)  -[facility] Name 09/12/23 1414          Functional Mobility    Functional Mobility- Ind. Level unable to perform  -[facility] 09/12/23 1414          Activities of Daily Living    BADL Assessment/Intervention lower body dressing;upper body dressing  -[facility] 09/12/23 1414          Lower Body Dressing Assessment/Training    [place] Level (Lower Body Dressing) don;socks;dependent (less than 25% patient effort)  -     Position (Lower Body Dressing) edge of bed sitting  -[facility] Name 09/12/23 1414          Upper Body Dressing Assessment/Training    [place] Level (Upper Body Dressing) don;other (see comments);maximum assist (25% patient effort);verbal cues  LUE sling  -     Position (Upper Body Dressing) edge of bed sitting  -               User Key  (r) = Recorded By, (t) = Taken By, (c) = Cosigned By      Initials Name Provider Type    Julieta Rooney OT Occupational Therapist                   Obj/Interventions       [facility] Name 09/12/23 1415          Sensory Assessment (Somatosensory)    Sensory Assessment (Somatosensory) UE sensation intact  -[facility] 09/12/23 1415          Vision Assessment/Intervention    Visual Impairment/Limitations WFL  -[facility] 09/12/23 1415          Range of Motion Comprehensive    General Range of Motion bilateral upper extremity ROM WFL  -[facility] Name 09/12/23 1415          Strength Comprehensive (MMT)    General Manual Muscle Testing (MMT) Assessment upper extremity strength deficits identified  -     Comment, General Manual Muscle Testing (MMT) Assessment LUE deferred d/t L clavicle fx, RUE grossly 4/5  -[facility] Name 09/12/23 1415          Balance    Balance Assessment sitting  static balance;sit to stand dynamic balance;standing static balance;standing dynamic balance;sitting dynamic balance  -     Static Sitting Balance 1-person assist;verbal cues;contact guard  -     Dynamic Sitting Balance moderate assist;1-person assist;verbal cues  -     Position, Sitting Balance supported;sitting edge of bed  -     Sit to Stand Dynamic Balance maximum assist;1-person assist;verbal cues  -     Static Standing Balance maximum assist;1-person assist;verbal cues  -     Dynamic Standing Balance maximum assist;1-person assist;verbal cues  -     Position/Device Used, Standing Balance supported  -     Balance Interventions sitting;sit to stand;occupation based/functional task  -               User Key  (r) = Recorded By, (t) = Taken By, (c) = Cosigned By      Initials Name Provider Type    Julieta Rooney OT Occupational Therapist                   Goals/Plan       Row Name 09/12/23 1418          Bed Mobility Goal 1 (OT)    Activity/Assistive Device (Bed Mobility Goal 1, OT) sit to supine;supine to sit  -     [place] Level/Cues Needed (Bed Mobility Goal 1, OT) minimum assist (75% or more patient effort);verbal cues required  -     Time Frame (Bed Mobility Goal 1, OT) long term goal (LTG);10 days  -     Progress/Outcomes (Bed Mobility Goal 1, OT) goal ongoing  -       Row Name 09/12/23 1418          Transfer Goal 1 (OT)    Activity/Assistive Device (Transfer Goal 1, OT) sit-to-stand/stand-to-sit;bed-to-chair/chair-to-bed;commode, bedside without drop arms  -     [place] Level/Cues Needed (Transfer Goal 1, OT) moderate assist (50-74% patient effort);verbal cues required  -     Time Frame (Transfer Goal 1, OT) long term goal (LTG);10 days  -     Progress/Outcome (Transfer Goal 1, OT) goal ongoing  -       Row Name 09/12/23 1418          Toileting Goal 1 (OT)    Activity/Device (Toileting Goal 1, OT) adjust/manage clothing;perform perineal hygiene;commode,  bedside without drop arms  -     [place] Level/Cues Needed (Toileting Goal 1, OT) moderate assist (50-74% patient effort);verbal cues required  -     Time Frame (Toileting Goal 1, OT) long term goal (LTG);10 days  -     Progress/Outcome (Toileting Goal 1, OT) goal ongoing  -       Row Name 09/12/23 1418          Grooming Goal 1 (OT)    Activity/Device (Grooming Goal 1, OT) hair care;oral care;wash face, hands  -     [place] (Grooming Goal 1, OT) minimum assist (75% or more patient effort);verbal cues required  -     Time Frame (Grooming Goal 1, OT) long term goal (LTG);10 days  -     Progress/Outcome (Grooming Goal 1, OT) goal ongoing  -       Row Name 09/12/23 1418          Therapy Assessment/Plan (OT)    Planned Therapy Interventions (OT) activity tolerance training;adaptive equipment training;BADL retraining;functional balance retraining;IADL retraining;occupation/activity based interventions;patient/caregiver education/training;ROM/therapeutic exercise;strengthening exercise;transfer/mobility retraining  -               User Key  (r) = Recorded By, (t) = Taken By, (c) = Cosigned By      Initials Name Provider Type     Julieta Mayfield OT Occupational Therapist                   Clinical Impression       Row Name 09/12/23 1416          Pain Assessment    Pain Intervention(s) Repositioned;Ambulation/increased activity  -     Additional Documentation Pain Scale: FACES Pre/Post-Treatment (Group)  -[facility] Name 09/12/23 1416          Pain Scale: FACES Pre/Post-Treatment    Pain: FACES Scale, Pretreatment 2-->hurts little bit  -     Posttreatment Pain Rating 2-->hurts little bit  -     Pain Location generalized  -     Pain Location - back  -       Row Name 09/12/23 1416          Plan of Care Review    Plan of Care Reviewed With patient  -     Outcome Evaluation Pt presents w/ increased back pain, generalized weakness, decreased functional endurance, and balance  deficits limiting her ADL independence. Pt would benefit from continued skilled IPOT services to address current functional deficits. Rec IRF at d/c.  -       Row Name 09/12/23 1416          Therapy Assessment/Plan (OT)    Rehab Potential (OT) good, to achieve stated therapy goals  -     Criteria for Skilled Therapeutic Interventions Met (OT) yes;skilled treatment is necessary  -     Therapy Frequency (OT) daily  -       Row Name 09/12/23 1416          Therapy Plan Review/Discharge Plan (OT)    Anticipated Discharge Disposition (OT) inpatient rehabilitation facility  -       Row Name 09/12/23 1416          Vital Signs    O2 Delivery Pre Treatment room air  -     O2 Delivery Intra Treatment room air  -     O2 Delivery Post Treatment room air  -     Pre Patient Position Supine  -     Intra Patient Position Standing  -     Post Patient Position Sitting  -       Row Name 09/12/23 1416          Positioning and Restraints    Pre-Treatment Position in bed  -     Post Treatment Position chair  -     In Chair notified nsg;reclined;call light within reach;encouraged to call for assist;exit alarm on;waffle cushion;legs elevated;LUE elevated  -               User Key  (r) = Recorded By, (t) = Taken By, (c) = Cosigned By      Initials Name Provider Type    Julieta Rooney, OT Occupational Therapist                   Outcome Measures       Row Name 09/12/23 1419          How much help from another is currently needed...    Putting on and taking off regular lower body clothing? 2  -MC     Bathing (including washing, rinsing, and drying) 2  -MC     Toileting (which includes using toilet bed pan or urinal) 2  -MC     Putting on and taking off regular upper body clothing 2  -MC     Taking care of personal grooming (such as brushing teeth) 2  -MC     Eating meals 2  -MC     AM-PAC 6 Clicks Score (OT) 12  -       Row Name 09/12/23 1419          Functional Assessment    Outcome Measure Options AM-PAC  6 Clicks Daily Activity (OT)  -               User Key  (r) = Recorded By, (t) = Taken By, (c) = Cosigned By      Initials Name Provider Type     Julieta Mayfield OT Occupational Therapist                    Occupational Therapy Education       Title: PT OT SLP Therapies (In Progress)       Topic: Occupational Therapy (In Progress)       Point: ADL training (In Progress)       Description:   Instruct learner(s) on proper safety adaptation and remediation techniques during self care or transfers.   Instruct in proper use of assistive devices.                  Learning Progress Summary             Patient Acceptance, E, NR by  at 9/12/2023 1419                         Point: Home exercise program (Not Started)       Description:   Instruct learner(s) on appropriate technique for monitoring, assisting and/or progressing therapeutic exercises/activities.                  Learner Progress:  Not documented in this visit.              Point: Precautions (In Progress)       Description:   Instruct learner(s) on prescribed precautions during self-care and functional transfers.                  Learning Progress Summary             Patient Acceptance, E, NR by  at 9/12/2023 1419                         Point: Body mechanics (In Progress)       Description:   Instruct learner(s) on proper positioning and spine alignment during self-care, functional mobility activities and/or exercises.                  Learning Progress Summary             Patient Acceptance, E, NR by  at 9/12/2023 1419                                         User Key       Initials Effective Dates Name Provider Type Discipline     10/14/22 -  Julieta Mayfield OT Occupational Therapist OT                  OT Recommendation and Plan  Planned Therapy Interventions (OT): activity tolerance training, adaptive equipment training, BADL retraining, functional balance retraining, IADL retraining, occupation/activity based interventions,  patient/caregiver education/training, ROM/therapeutic exercise, strengthening exercise, transfer/mobility retraining  Therapy Frequency (OT): daily  Plan of Care Review  Plan of Care Reviewed With: patient  Outcome Evaluation: Pt presents w/ increased back pain, generalized weakness, decreased functional endurance, and balance deficits limiting her ADL independence. Pt would benefit from continued skilled IPOT services to address current functional deficits. Rec IRF at d/c.     Time Calculation:   Evaluation Complexity (OT)  Review Occupational Profile/Medical/Therapy History Complexity: expanded/moderate complexity  Assessment, Occupational Performance/Identification of Deficit Complexity: 3-5 performance deficits  Clinical Decision Making Complexity (OT): detailed assessment/moderate complexity  Overall Complexity of Evaluation (OT): moderate complexity     Time Calculation- OT       Row Name 09/12/23 1419             Time Calculation- OT    OT Start Time 1337  -MC      OT Received On 09/12/23  -      OT Goal Re-Cert Due Date 09/22/23  -         Timed Charges    45058 - OT Therapeutic Activity Minutes 7  -MC      41125 - OT Self Care/Mgmt Minutes 8  -MC         Untimed Charges    OT Eval/Re-eval Minutes 31  -MC         Total Minutes    Timed Charges Total Minutes 15  -MC      Untimed Charges Total Minutes 31  -MC       Total Minutes 46  -MC                User Key  (r) = Recorded By, (t) = Taken By, (c) = Cosigned By      Initials Name Provider Type     Julieta Mayfield OT Occupational Therapist                  Therapy Charges for Today       Code Description Service Date Service Provider Modifiers Qty    [id number]  OT SELF CARE/MGMT/TRAIN EA 15 MIN 9/12/2023 Julieta Mayfield OT GO 1    [id number]  OT EVAL MOD COMPLEXITY 3 9/12/2023 Julieta Mayfield OT GO 1                 Julieta Mayfield OT  9/12/2023

## 2023-09-12 NOTE — PLAN OF CARE
Goal Outcome Evaluation:      VSS throughout shift. NSR on monitor, on room air. Lost IV access this shift, unable to complete Zosyn this AM. Incontinent of bowel and bladder, urine brown. Continue to monitor.

## 2023-09-13 LAB
ALBUMIN SERPL-MCNC: 2.8 G/DL (ref 3.5–5.2)
ALBUMIN/GLOB SERPL: 0.8 G/DL
ALP SERPL-CCNC: 66 U/L (ref 39–117)
ALT SERPL W P-5'-P-CCNC: 5 U/L (ref 1–33)
ANION GAP SERPL CALCULATED.3IONS-SCNC: 15 MMOL/L (ref 5–15)
AST SERPL-CCNC: 9 U/L (ref 1–32)
BACTERIA SPEC AEROBE CULT: NO GROWTH
BASOPHILS # BLD AUTO: 0.04 10*3/MM3 (ref 0–0.2)
BASOPHILS NFR BLD AUTO: 0.5 % (ref 0–1.5)
BILIRUB SERPL-MCNC: 0.2 MG/DL (ref 0–1.2)
BUN SERPL-MCNC: 15 MG/DL (ref 8–23)
BUN/CREAT SERPL: 13.5 (ref 7–25)
CALCIUM SPEC-SCNC: 8.5 MG/DL (ref 8.6–10.5)
CHLORIDE SERPL-SCNC: 110 MMOL/L (ref 98–107)
CO2 SERPL-SCNC: 18 MMOL/L (ref 22–29)
CREAT SERPL-MCNC: 1.11 MG/DL (ref 0.57–1)
DEPRECATED RDW RBC AUTO: 45.9 FL (ref 37–54)
EGFRCR SERPLBLD CKD-EPI 2021: 55.6 ML/MIN/1.73
EOSINOPHIL # BLD AUTO: 0.12 10*3/MM3 (ref 0–0.4)
EOSINOPHIL NFR BLD AUTO: 1.4 % (ref 0.3–6.2)
ERYTHROCYTE [DISTWIDTH] IN BLOOD BY AUTOMATED COUNT: 14.1 % (ref 12.3–15.4)
GLOBULIN UR ELPH-MCNC: 3.3 GM/DL
GLUCOSE BLDC GLUCOMTR-MCNC: 172 MG/DL (ref 70–130)
GLUCOSE BLDC GLUCOMTR-MCNC: 194 MG/DL (ref 70–130)
GLUCOSE BLDC GLUCOMTR-MCNC: 198 MG/DL (ref 70–130)
GLUCOSE BLDC GLUCOMTR-MCNC: 230 MG/DL (ref 70–130)
GLUCOSE SERPL-MCNC: 142 MG/DL (ref 65–99)
HCT VFR BLD AUTO: 30 % (ref 34–46.6)
HGB BLD-MCNC: 9.9 G/DL (ref 12–15.9)
IMM GRANULOCYTES # BLD AUTO: 0.12 10*3/MM3 (ref 0–0.05)
IMM GRANULOCYTES NFR BLD AUTO: 1.4 % (ref 0–0.5)
LYMPHOCYTES # BLD AUTO: 1.61 10*3/MM3 (ref 0.7–3.1)
LYMPHOCYTES NFR BLD AUTO: 18.3 % (ref 19.6–45.3)
MCH RBC QN AUTO: 29.6 PG (ref 26.6–33)
MCHC RBC AUTO-ENTMCNC: 33 G/DL (ref 31.5–35.7)
MCV RBC AUTO: 89.6 FL (ref 79–97)
MONOCYTES # BLD AUTO: 0.69 10*3/MM3 (ref 0.1–0.9)
MONOCYTES NFR BLD AUTO: 7.8 % (ref 5–12)
NEUTROPHILS NFR BLD AUTO: 6.22 10*3/MM3 (ref 1.7–7)
NEUTROPHILS NFR BLD AUTO: 70.6 % (ref 42.7–76)
NRBC BLD AUTO-RTO: 0 /100 WBC (ref 0–0.2)
PLATELET # BLD AUTO: 470 10*3/MM3 (ref 140–450)
PMV BLD AUTO: 10.4 FL (ref 6–12)
POTASSIUM SERPL-SCNC: 4.1 MMOL/L (ref 3.5–5.2)
PROT SERPL-MCNC: 6.1 G/DL (ref 6–8.5)
RBC # BLD AUTO: 3.35 10*6/MM3 (ref 3.77–5.28)
SODIUM SERPL-SCNC: 143 MMOL/L (ref 136–145)
WBC NRBC COR # BLD: 8.8 10*3/MM3 (ref 3.4–10.8)

## 2023-09-13 PROCEDURE — 82948 REAGENT STRIP/BLOOD GLUCOSE: CPT

## 2023-09-13 PROCEDURE — 97535 SELF CARE MNGMENT TRAINING: CPT

## 2023-09-13 PROCEDURE — 63710000001 INSULIN DETEMIR PER 5 UNITS: Performed by: INTERNAL MEDICINE

## 2023-09-13 PROCEDURE — 85025 COMPLETE CBC W/AUTO DIFF WBC: CPT | Performed by: FAMILY MEDICINE

## 2023-09-13 PROCEDURE — 99232 SBSQ HOSP IP/OBS MODERATE 35: CPT | Performed by: INTERNAL MEDICINE

## 2023-09-13 PROCEDURE — 97530 THERAPEUTIC ACTIVITIES: CPT

## 2023-09-13 PROCEDURE — 80053 COMPREHEN METABOLIC PANEL: CPT | Performed by: FAMILY MEDICINE

## 2023-09-13 PROCEDURE — 63710000001 INSULIN LISPRO (HUMAN) PER 5 UNITS: Performed by: NURSE PRACTITIONER

## 2023-09-13 RX ADMIN — TERAZOSIN HYDROCHLORIDE 1 MG: 1 CAPSULE ORAL at 20:36

## 2023-09-13 RX ADMIN — RIVAROXABAN 15 MG: 15 TABLET, FILM COATED ORAL at 18:17

## 2023-09-13 RX ADMIN — CARVEDILOL 12.5 MG: 12.5 TABLET, FILM COATED ORAL at 18:17

## 2023-09-13 RX ADMIN — INSULIN LISPRO 3 UNITS: 100 INJECTION, SOLUTION INTRAVENOUS; SUBCUTANEOUS at 20:35

## 2023-09-13 RX ADMIN — LIDOCAINE 1 PATCH: 560 PATCH PERCUTANEOUS; TOPICAL; TRANSDERMAL at 09:38

## 2023-09-13 RX ADMIN — INSULIN DETEMIR 5 UNITS: 100 INJECTION, SOLUTION SUBCUTANEOUS at 09:36

## 2023-09-13 RX ADMIN — MULTIVITAMIN TABLET 1 TABLET: TABLET at 09:40

## 2023-09-13 RX ADMIN — INSULIN LISPRO 2 UNITS: 100 INJECTION, SOLUTION INTRAVENOUS; SUBCUTANEOUS at 09:36

## 2023-09-13 RX ADMIN — AMOXICILLIN AND CLAVULANATE POTASSIUM 1 TABLET: 875; 125 TABLET, FILM COATED ORAL at 20:36

## 2023-09-13 RX ADMIN — FERROUS SULFATE TAB 325 MG (65 MG ELEMENTAL FE) 325 MG: 325 (65 FE) TAB at 09:40

## 2023-09-13 RX ADMIN — AMOXICILLIN AND CLAVULANATE POTASSIUM 1 TABLET: 875; 125 TABLET, FILM COATED ORAL at 09:40

## 2023-09-13 RX ADMIN — FAMOTIDINE 20 MG: 20 TABLET ORAL at 06:39

## 2023-09-13 RX ADMIN — MIRTAZAPINE 7.5 MG: 15 TABLET, FILM COATED ORAL at 20:36

## 2023-09-13 RX ADMIN — VANCOMYCIN HYDROCHLORIDE 125 MG: 125 CAPSULE ORAL at 09:40

## 2023-09-13 RX ADMIN — VANCOMYCIN HYDROCHLORIDE 125 MG: 125 CAPSULE ORAL at 20:36

## 2023-09-13 RX ADMIN — Medication 5 MG: at 20:36

## 2023-09-13 RX ADMIN — INSULIN LISPRO 2 UNITS: 100 INJECTION, SOLUTION INTRAVENOUS; SUBCUTANEOUS at 13:19

## 2023-09-13 RX ADMIN — HYDRALAZINE HYDROCHLORIDE 50 MG: 50 TABLET, FILM COATED ORAL at 20:35

## 2023-09-13 RX ADMIN — Medication 1 CAPSULE: at 09:40

## 2023-09-13 RX ADMIN — HYDRALAZINE HYDROCHLORIDE 50 MG: 50 TABLET, FILM COATED ORAL at 06:39

## 2023-09-13 RX ADMIN — OXYCODONE HYDROCHLORIDE AND ACETAMINOPHEN 1 TABLET: 5; 325 TABLET ORAL at 10:59

## 2023-09-13 RX ADMIN — OXYCODONE HYDROCHLORIDE AND ACETAMINOPHEN 1 TABLET: 5; 325 TABLET ORAL at 20:36

## 2023-09-13 RX ADMIN — INSULIN LISPRO 2 UNITS: 100 INJECTION, SOLUTION INTRAVENOUS; SUBCUTANEOUS at 18:17

## 2023-09-13 NOTE — PROGRESS NOTES
[facility] Medicine Services  PROGRESS NOTE    Patient Name: Thelma Michael  : 1960  MRN: [id number]    Date of Admission: 2023  Primary Care Physician: Monet Howe APRN    Subjective   Subjective     CC: Follow-up pneumonia, fever      HPI: No acute events overnight, patient was afebrile, mentioned that she feels much better this morning.    Objective   Objective     Vital Signs:   Temp:  [98.2 °F (36.8 °C)-99 °F (37.2 °C)] 98.4 °F (36.9 °C)  Heart Rate:  [72-82] 80  Resp:  [18] 18  BP: (127-153)/(57-73) 128/61     Physical Exam:  Constitutional: Chronically ill-appearing female, no acute distress, sleepy  HENT: NCAT, mucous membranes moist  Respiratory: Clear to auscultation bilaterally, respiratory effort normal   Cardiovascular: RRR, no murmurs, rubs, or gallops  Gastrointestinal: Positive bowel sounds, soft, nontender, nondistended  Musculoskeletal: No bilateral ankle edema  Psychiatric: Appropriate affect, cooperative  Neurologic: Nonfocal  Skin: No rashes      Results Reviewed:  LAB RESULTS:      Lab 23  0305 23  0349 23  1602 23  0412 09/10/23  0343 23  1747 23  1626   WBC 8.80 10.14  --  13.98* 6.77  --  6.47   HEMOGLOBIN 9.9* 9.8*  --  11.6* 10.4*  --  11.6*   HEMATOCRIT 30.0* 30.0*  --  35.1 32.4*  --  36.3   PLATELETS 470* 426  --  417 306  --  337   NEUTROS ABS 6.22 7.43*  --  11.51* 3.68  --  3.88   IMMATURE GRANS (ABS) 0.12* 0.08*  --  0.06* 0.04  --  0.02   LYMPHS ABS 1.61 1.53  --  1.40 2.27  --  1.89   MONOS ABS 0.69 0.91*  --  0.96* 0.73  --  0.67   EOS ABS 0.12 0.17  --  0.02 0.02  --  0.00   MCV 89.6 89.8  --  88.6 90.8  --  90.1   PROCALCITONIN  --   --  0.15  --   --   --  0.15   LACTATE  --   --  1.5  --   --  1.0 0.7         Lab 23  0305 23  0349 23  0412 09/10/23  1545 09/10/23  0343 23  1626 23  1612 23  0436 23  0901 23  0859   SODIUM 143 141 143   --  141 145   < > 143   < >  --    POTASSIUM 4.1 3.8 4.1 5.2 3.4* 3.9   < > 3.4*   < >  --    CHLORIDE 110* 109* 112*  --  111* 110*   < > 111*   < >  --    CO2 18.0* 23.0 22.0  --  22.0 20.0*   < > 21.0*   < >  --    ANION GAP 15.0 9.0 9.0  --  8.0 15.0   < > 11.0   < >  --    BUN 15 14 15  --  25* 17   < > 20   < >  --    CREATININE 1.11* 1.21* 1.19*  --  1.50* 1.30*   < > 1.26*   < >  --    EGFR 55.6* 50.2* 51.2*  --  38.8* 46.0*   < > 47.8*   < >  --    GLUCOSE 142* 58* 93  --  76 127*   < > 58*   < >  --    CALCIUM 8.5* 8.4* 8.8  --  8.2* 8.6   < > 8.3*   < >  --    MAGNESIUM  --  2.1 1.9  --  2.1 1.8  --  2.0  --   --    PHOSPHORUS  --   --   --   --   --   --   --  2.6  --   --    HEMOGLOBIN A1C  --   --   --   --   --   --   --   --   --  11.10*    < > = values in this interval not displayed.         Lab 09/13/23  0305 09/12/23  0349 09/11/23  0412 09/10/23  0343 09/08/23  1626   TOTAL PROTEIN 6.1 5.9* 6.6 5.7* 6.5   ALBUMIN 2.8* 2.8* 3.2* 2.7* 3.2*   GLOBULIN 3.3 3.1 3.4 3.0 3.3   ALT (SGPT) 5 5 7 7 10   AST (SGOT) 9 9 13 11 14   BILIRUBIN 0.2 0.2 0.3 0.3 0.4   ALK PHOS 66 69 79 62 77                     Brief Urine Lab Results  (Last result in the past 365 days)        Color   Clarity   Blood   Leuk Est   Nitrite   Protein   CREAT   Urine HCG        09/11/23 1536 Yellow   Turbid   Large (3+)   Large (3+)   Negative   100 mg/dL (2+)                   Microbiology Results Abnormal       Procedure Component Value - Date/Time    Blood Culture - Blood, Hand, Left [[id number]]  (Normal) Collected: 09/09/23 1747    Lab Status: Preliminary result Specimen: Blood from Hand, Left Updated: 09/12/23 1845     Blood Culture No growth at 3 days    Blood Culture - Blood, Hand, Right [[id number]]  (Normal) Collected: 09/09/23 1751    Lab Status: Preliminary result Specimen: Blood from Hand, Right Updated: 09/12/23 1845     Blood Culture No growth at 3 days    Blood Culture - Blood, Hand, Left [[id number]]  (Normal) Collected:  09/07/23 0752    Lab Status: Final result Specimen: Blood from Hand, Left Updated: 09/12/23 0815     Blood Culture No growth at 5 days    Narrative:      Aerobic bottle only      Blood Culture - Blood, Hand, Right [[id number]]  (Normal) Collected: 09/07/23 0750    Lab Status: Final result Specimen: Blood from Hand, Right Updated: 09/12/23 0815     Blood Culture No growth at 5 days    Urine Culture - Urine, Urine, Random Void [[id number]]  (Normal) Collected: 09/11/23 1536    Lab Status: Preliminary result Specimen: Urine, Random Void Updated: 09/12/23 0800     Urine Culture No growth    Respiratory Panel PCR w/COVID-19(SARS-CoV-2) [facility]MA/JUAN ALBERTO/EDY/[facility]/ROSALINA In-House, NP Swab in UTM/VTM, 3-4 HR TAT - Swab, Nasopharynx [[id number]]  (Normal) Collected: 09/08/23 1714    Lab Status: Final result Specimen: Swab from Nasopharynx Updated: 09/08/23 1818     ADENOVIRUS, PCR Not Detected     Coronavirus 229E Not Detected     Coronavirus HKU1 Not Detected     Coronavirus NL63 Not Detected     Coronavirus OC43 Not Detected     COVID19 Not Detected     Human Metapneumovirus Not Detected     Human Rhinovirus/Enterovirus Not Detected     Influenza A PCR Not Detected     Influenza B PCR Not Detected     Parainfluenza Virus 1 Not Detected     Parainfluenza Virus 2 Not Detected     Parainfluenza Virus 3 Not Detected     Parainfluenza Virus 4 Not Detected     RSV, PCR Not Detected     Bordetella pertussis pcr Not Detected     Bordetella parapertussis PCR Not Detected     Chlamydophila pneumoniae PCR Not Detected     Mycoplasma pneumo by PCR Not Detected    Narrative:      In the setting of a positive respiratory panel with a viral infection PLUS a negative procalcitonin without other underlying concern for bacterial infection, consider observing off antibiotics or discontinuation of antibiotics and continue supportive care. If the respiratory panel is positive for atypical bacterial infection (Bordetella pertussis, Chlamydophila  pneumoniae, or Mycoplasma pneumoniae), consider antibiotic de-escalation to target atypical bacterial infection.    Respiratory Panel PCR w/COVID-19(SARS-CoV-2) [facility]/JUAN ALBERTO/EDY/[facility]ALINA In-House, NP Swab in UTM/VTM, 3-4 HR TAT - Swab, Nasopharynx [[id number]]  (Normal) Collected: 09/06/23 0620    Lab Status: Final result Specimen: Swab from Nasopharynx Updated: 09/06/23 0744     ADENOVIRUS, PCR Not Detected     Coronavirus 229E Not Detected     Coronavirus HKU1 Not Detected     Coronavirus NL63 Not Detected     Coronavirus OC43 Not Detected     COVID19 Not Detected     Human Metapneumovirus Not Detected     Human Rhinovirus/Enterovirus Not Detected     Influenza A PCR Not Detected     Influenza B PCR Not Detected     Parainfluenza Virus 1 Not Detected     Parainfluenza Virus 2 Not Detected     Parainfluenza Virus 3 Not Detected     Parainfluenza Virus 4 Not Detected     RSV, PCR Not Detected     Bordetella pertussis pcr Not Detected     Bordetella parapertussis PCR Not Detected     Chlamydophila pneumoniae PCR Not Detected     Mycoplasma pneumo by PCR Not Detected    Narrative:      In the setting of a positive respiratory panel with a viral infection PLUS a negative procalcitonin without other underlying concern for bacterial infection, consider observing off antibiotics or discontinuation of antibiotics and continue supportive care. If the respiratory panel is positive for atypical bacterial infection (Bordetella pertussis, Chlamydophila pneumoniae, or Mycoplasma pneumoniae), consider antibiotic de-escalation to target atypical bacterial infection.            CT Chest Without Contrast Diagnostic    Result Date: 9/12/2023  CT CHEST WO CONTRAST DIAGNOSTIC Date of Exam: 9/12/2023 10:05 AM EDT Indication: Pneumonia, complication suspected, xray done Follow up possible PNA on CT abd/pelvis. Comparison: CT abdomen of 9/11/2023. Prior CT chest is dated February 4, 2023 FINDINGS: There is worsening fairly extensive  infiltrate in the right lower lobe with multifocal patchy areas of consolidation. There is infiltrate in the posterior segment of the lateral right middle lobe with patchy area of consolidation, not included on the CT abdomen of 9/11/2023 and new as compared to the prior CT chest. There is some slight scar in the left lung base. There are no significant infiltrates on the left.. Cystic lesion of the right thyroid is stable since the prior CT chest. There are  no enlarged mediastinal nodes. There are no pleural effusions. Technique: Axial CT images were obtained of the chest without contrast administration.  Reconstructed coronal and sagittal images were also obtained. Automated exposure control and iterative construction methods were used.     Impression: Impression: Right middle and right lower lobe pneumonia. Electronically Signed: Kerri Peters MD  9/12/2023 11:17 AM EDT  Workstation ID: [id number]    CT Abdomen Pelvis With Contrast    Result Date: 9/11/2023  CT ABDOMEN PELVIS W CONTRAST Date of Exam: 9/11/2023 10:45 AM EDT Indication: Abdominal pain, diarrhea. Comparison: 9/6/2023 Technique: Axial CT images were obtained of the abdomen and pelvis following the uneventful intravenous administration of 75 mL Isovue-300. Reconstructed coronal and sagittal images were also obtained. Automated exposure control and iterative construction methods were used. Findings: Lung Bases:  There is interval development of focal peripheral nodular airspace disease in the right lower lobe compatible with pneumonia versus nodular atelectasis. Again noticed a gastric stimulator device. Liver:Liver is normal in size and CT density. No focal lesions. Biliary/Gallbladder: The gallbladder is without evidence of radiopaque stones. The biliary tree is nondilated. Spleen:Spleen is normal in size and CT density. Pancreas: Pancreas shows homogeneous density. There is no evidence of pancreatic mass or peripancreatic fluid. Kidneys: Kidneys  are normal in size. There are no stones or hydronephrosis. Few tiny left renal cysts Adrenals: Stable diffuse thickening of the adrenal glands compatible with adrenal hyperplasia Retroperitoneal/Lymph Nodes/Vasculature: No retroperitoneal adenopathy is identified by size criteria. Gastrointestinal/Mesentery: The bowel loops are non-dilated without definite wall thickening or mass. The appendix appears within normal limits. No evidence of obstruction. No free air. Air-fluid levels throughout the large bowel with no associated wall thickening compatible with diarrhea Bladder: The bladder is better distended than on the previous examination with mild persistent thickening of the roof of the bladder wall which may represent chronic cystitis. There are no intraluminal calcifications   Bony Structures:  Visualized bony structures are consistent with the patient's age.     Impression: Impression: 1. Nondilated large bowel with air-fluid levels and no wall thickening in keeping with the patient's history of diarrhea 2. Interval development of a peripheral nodular density in the right lower lobe may represent nodular atelectasis or pneumonia. Clinical relation and close follow-up recommended with CT chest 3. Additional stable nonemergent findings as above Electronically Signed: Quinton Juarez MD  9/11/2023 12:12 PM EDT  Workstation ID: [id number]     Results for orders placed during the hospital encounter of 12/06/22    Adult Transthoracic Echo Complete W/ Cont if Necessary Per Protocol    Interpretation Summary    Left ventricular ejection fraction appears to be greater than 70%.    Left ventricular wall thickness is consistent with moderate concentric hypertrophy.    Left ventricular diastolic function is consistent with (grade I) impaired relaxation.    Estimated right ventricular systolic pressure from tricuspid regurgitation is mildly elevated (35-45 mmHg). Calculated right ventricular systolic pressure from tricuspid  regurgitation is 39 mmHg.      Current medications:  Scheduled Meds:amLODIPine, 10 mg, Oral, Q24H  amoxicillin-clavulanate, 1 tablet, Oral, Q12H  carvedilol, 12.5 mg, Oral, BID With Meals  famotidine, 20 mg, Oral, QAM  ferrous sulfate, 325 mg, Oral, Daily With Breakfast  hydrALAZINE, 50 mg, Oral, Q8H  insulin detemir, 5 Units, Subcutaneous, Daily  insulin lispro, 2-7 Units, Subcutaneous, 4x Daily AC & at Bedtime  lactobacillus acidophilus, 1 capsule, Oral, Daily  Lidocaine, 1 patch, Transdermal, Q24H  losartan, 50 mg, Oral, Q24H  mirtazapine, 7.5 mg, Oral, Nightly  multivitamin, 1 tablet, Oral, Daily  rivaroxaban, 15 mg, Oral, Daily With Dinner  sodium chloride, 10 mL, Intravenous, Q12H  terazosin, 1 mg, Oral, Nightly  vancomycin, 125 mg, Oral, BID      Continuous Infusions:   PRN Meds:.  acetaminophen    dextrose    dextrose    glucagon (human recombinant)    hydrALAZINE    melatonin    ondansetron **OR** ondansetron    oxyCODONE-acetaminophen    Potassium Replacement - Follow Nurse / BPA Driven Protocol    prochlorperazine **OR** prochlorperazine **OR** prochlorperazine    [COMPLETED] Insert Peripheral IV **AND** sodium chloride    sodium chloride    sodium chloride    Assessment & Plan   Assessment & Plan     Active Hospital Problems    Diagnosis  POA    **Hypertensive urgency [I16.0]  Yes    Acute UTI (urinary tract infection) [N39.0]  Yes    Nausea vomiting and diarrhea [R11.2, R19.7]  Yes    PAF (paroxysmal atrial fibrillation) [I48.0]  Yes    Anxiety associated with depression [F41.8]  Yes    Clavicle fracture [S42.009A]  Yes    CKD (chronic kidney disease) stage 3, GFR 30-59 ml/min [N18.30]  Yes    Hypokalemia [E87.6]  Yes    Gastroparesis [K31.84]  Yes    Uncontrolled type 1 diabetes mellitus with hyperglycemia [E10.65]  Yes    Hyperlipidemia [E78.5]  Yes    Hypertension [I10]  Yes      Resolved Hospital Problems   No resolved problems to display.        Brief Hospital Course to date:  Thelma Michael  is a 62 y.o. female w/ a hx of poorly controlled type 1 diabetes, Afib on Xarelto, iron deficiency anemia, gastroparesis s/p gastric stimulator, HTN, HLD, migraines, chronic urinary retention (previously performed self-catheterization), strokes (chronic right lentiform nucleus and left cerebellar lacunar infarcts found on MRI in February of 2023), tobacco abuse and GERD who presented to the emergency room with complaints of nausea, vomiting and diarrhea. Patient has had issues with fevers since the evening of 9/8, repeat blood cultures have been NGTD and no obvious new symptoms.      Positive blood cultures 2/2  Abdominal pain, Sepsis   Fever  Right middle/right lower lobe pneumonia  - suspected contaminate, Vancomycin then stopped,-repeat BC obtained NGTD  - ID following, plans for again repeat blood cultures on 9/9, currently negative   -- patient continues to have abdominal pain, and intermittent fevers, repeat CT abd/pelvis is negative for acute findings of abdomen, CT chest is suggestive of right middle and lower lobe pneumonia   - Previous procal and LA negative, repeat is equally negative  --ID following she is s/pIV Zosyn t per ID, lost IV access, has been transitioned to Augmentin for now     hypertensive urgency   History of hypertension  -She is status post Cardene gtt. BP much improved  -Continue p.o. hydralazine, Coreg, Norvasc, terazosin  -Resume home losartan renal function is stable     Poorly controlled type 1 diabetes A1c 11.1%   -FSBG's reviewed labile, with hypo and hyperglycemia, currently stable  -Continue basal and SSI, adjust as warranted  -diabetic educator consult       Nausea, vomiting, diarrhea   Hx of gastroparesis   -CT abd/pel without acute findings other than bladder wall thickening c/w chronic cystitis   --GI PCR + Enteroaggregative E.coli -- patient now with fever since the evening of 9/8, ID consult has been requested, continue to follow.   - Continued supportive care at this  time     Acute UTI   Hx of neurogenic bladder (previously self-cath'd)  -UA w/ 4+ bacteria, 31-50 WBCs   -urine culture > 100k GNB  -previous urine culture + for enterococcus faecalis in 4/23 (previous results w/ mixed sameera)  -Antibiotics as above per ID  -bladder scan q 4      Hypokalemia   Continue to monitor and replete per protocol     CKD Stage III  -Renal function seems to be stable and within her baseline  -s/p IV fluids  -monitor closely with N/V/D and minimal oral intake      PAF   -continue coreg, Xarelto      Expected Discharge Location and Transportation: Home  Expected Discharge   Expected Discharge Date: 9/15/2023; Expected Discharge Time:      DVT prophylaxis:  Medical DVT prophylaxis orders are present.     AM-PAC 6 Clicks Score (PT): 11 (09/12/23 0920)    CODE STATUS:   Code Status and Medical Interventions:   Ordered at: 09/06/23 0529     Code Status (Patient has no pulse and is not breathing):    CPR (Attempt to Resuscitate)     Medical Interventions (Patient has pulse or is breathing):    Full Support       Chris Medrano MD  09/13/23

## 2023-09-13 NOTE — PROGRESS NOTES
INFECTIOUS DISEASE PROGRESS NOTE    Thelma Michael  1960  [id number]    Date of Consult: 9/9/2023    Admission Date: 9/5/2023      Requesting Provider: Skip Jimenez DO  Evaluating Physician: Pedro Tucker MD    Reason for Consultation: febrile, recent diarrhea/E. coli infection, ? Contaminated blood cultures    History of present illness:    Patient is a 62 y.o. female with h/o T1DM/poorly controlled, afib/Xarelto, anemia, gastroparesis/gastric stimulator, , GERD, TIAs, HLD, HTN, CKD Stage IIIa (baseline 0.9-1.25), migraines, CVA, malnutrition, former tobacco abuse/quit 1 year ago, chronic urinary retention/not self cathing for over a year, smokes marijuana occasionally for back pain control and appetite stimulator, and recent distal left clavicle fracture 9/1/23 who presented to [facility] ED on 9/5 for nausea, vomiting, and diarrhea for 2 days prior to arrival.  On arrival, she had 3 episodes of diarrhea in ED with decrease oral intake.  She denied fever, chills, shortness of breath, or dysuria.  She denies any hematemesis, hematochezia, or melena.  She had blood on toilet paper and has h/o hemorrhoids.  She has had fevers up to 102.5 last night.  Labs were WBC 7300 with 84% neutrophils, PCT 0.17, creatinine 1.22, A1C 11.2, and UA WBC 31-50 with moderate LE and negative nitrite.  Urine culture is positive for E.coli (pan sens).  Blood cultures are positive in 2 of 2 sets (2 of 4 bottles) with CoNS.  A GI panel PCR was positive for Enteroaggregative E.coli.  A respiratory panel PCR is negative. A CXR on 9/5 showed no acute findings. A CT scan of a/p without contrast on 9/6 showed no acute findings.  She was on oral Vancomycin from 9/5 to 9/6.  She was given a dose of Rocephin on 9/6 and a diose of IV Vancomycin on 9/7.  She is currently not on any antibiotic therapy.  ID was asked to evaluate.      Of note: she has not had a Cdiff done on this visit, but she had a Cdiff test on 8/11/23 with PCR  positive and toxin EIA negative.  She states they did do a test on this visit and it was negative. She also has had positive EAEC multiple times on 3/22/22, 3/26/22,12/10/22, 8/11/23, and now 9/7/23.  It appears that she has chronic issues with gastroparesis flares and diarrhea.  Her EAEC has never been treated because of her prolonged QT with Cipro and Azithromycin contraindicated.     9/10/23: Tmax 103.1. Fevers improved this morning. She states she had a watery stool early this morning.  Denies shortness of breath, vomiting, rashes.  Still with abdominal tenderness and nausea.     9/11/23: Tmax 101 today.  She continues to not feel well.  She had 1 watery stool this a.m. She denies n/v, shortness of breath, rashes, or dysuria.  She complains of mid abdominal pain.  She has no appetite but was will to have an Ensure. WBC worse at 13.98.  Creatinine improved to 1.19 with CrCl of 45.      9/12/23: feeling fatigued today and states that she does not feel any better. No worsening diarrhea. No worsening abdominal pain. Denies any shortness of breath or cough. No n/v. Fevers resolved today. Leukocytosis improved.     9/13/23: No worsening shortness of breath or cough.  No severe watery diarrhea.  No abdominal pain.  Still feeling fatigued but slightly better today.  Working with physical therapy.  No fevers    Past Medical History:   Diagnosis Date    Acid reflux     Acute bronchitis     Cardiac murmur     Depression with anxiety 10/5/2020    Diabetes mellitus     Gastroesophageal reflux disease 5/13/2016    H/O echocardiogram 08/07/2012    i. LVEF 65%.ii. Mild LVH.iii. Borderline evidence of atrial septal aneurysm.  No PFO.     History of nuclear stress test 08/22/2014    Negative for ischemia and scars; LVEF 77%.      History of TIAs 7/15/2021    Hyperlipidemia     Hypertension     Impacted cerumen of both ears     Migraine     Migraines 10/31/2019    Self-catheterizes urinary bladder     Sinusitis     Stroke      Tobacco abuse     quit 4 days ago.      Urticaria     Vitamin D deficiency 2016       Past Surgical History:   Procedure Laterality Date    CAPSULE ENDOSCOPY  2021    Procedure: PILLCAM DEPLOYMENT;  Surgeon: Mikael Worthy MD;  Location:  [facility];  Service: Gastroenterology;;    COLONOSCOPY      COLONOSCOPY N/A 2021    Procedure: COLONOSCOPY;  Surgeon: Mikael Worthy MD;  Location:  [facility];  Service: Gastroenterology;  Laterality: N/A;    DENTAL PROCEDURE      ENDOSCOPY N/A 2021    Procedure: ESOPHAGOGASTRODUODENOSCOPY;  Surgeon: Brunner, Mark I, MD;  Location:  [facility];  Service: Gastroenterology;  Laterality: N/A;    ENDOSCOPY N/A 2021    Procedure: ESOPHAGOGASTRODUODENOSCOPY;  Surgeon: Mikael Worthy MD;  Location:  [facility];  Service: Gastroenterology;  Laterality: N/A;    ENDOSCOPY WITH JTUBE N/A 2022    Procedure: ESOPHAGOGASTRODUODENOSCOPY WITH JEJUNAL TUBE INSERTION;  Surgeon: Thom Glover MD;  Location:  [facility];  Service: Gastroenterology;  Laterality: N/A;    UPPER GASTROINTESTINAL ENDOSCOPY         Family History   Problem Relation Age of Onset    Anxiety disorder Other     Arthritis Other     ADD / ADHD Other     Heart disease Other         cardiac disorder    Depression Other     Diabetes Other     Hyperlipidemia Other     Hypertension Other     Lung cancer Other     Osteoporosis Other     Hypertension Brother     Diabetes Brother     Hyperlipidemia Mother     Hypertension Mother     Colon cancer Neg Hx        Social History     Socioeconomic History    Marital status:    Tobacco Use    Smoking status: Former     Packs/day: 0.25     Years: 30.00     Pack years: 7.50     Types: Cigarettes     Quit date: 2019     Years since quittin.2    Smokeless tobacco: Never    Tobacco comments:     BC PL never smoker    Vaping Use    Vaping Use: Never used   Substance and Sexual Activity    Alcohol use:  Yes     Alcohol/week: 1.0 standard drink     Types: 1 Glasses of wine per week     Comment: ocassional    Drug use: Yes     Frequency: 2.0 times per week     Types: Marijuana    Sexual activity: Not Currently     Comment: Single        No Known Allergies      Medication:    Current Facility-Administered Medications:     acetaminophen (TYLENOL) tablet 650 mg, 650 mg, Oral, Q6H PRN, HARI Jimenez DO, 650 mg at 09/11/23 0920    amLODIPine (NORVASC) tablet 10 mg, 10 mg, Oral, Q24H, Magali Delvalle APRN, 10 mg at 09/12/23 0908    amoxicillin-clavulanate (AUGMENTIN) 875-125 MG per tablet 1 tablet, 1 tablet, Oral, Q12H, Pedro Tucker MD, 1 tablet at 09/13/23 0940    carvedilol (COREG) tablet 12.5 mg, 12.5 mg, Oral, BID With Meals, Susan Cohen APRN, 12.5 mg at 09/12/23 1652    dextrose (D50W) (25 g/50 mL) IV injection 25 g, 25 g, Intravenous, Q15 Min PRN, Magali Delvalle APRN    dextrose (GLUTOSE) oral gel 15 g, 15 g, Oral, Q15 Min PRN, Magali Delvalle APRN    famotidine (PEPCID) tablet 20 mg, 20 mg, Oral, Vu HUMPHREY Stacy D, PharmD, 20 mg at 09/13/23 0639    ferrous sulfate tablet 325 mg, 325 mg, Oral, Daily With Breakfast, Magali Delvalle APRN, 325 mg at 09/13/23 0940    glucagon (GLUCAGEN) injection 1 mg, 1 mg, Intramuscular, Q15 Min PRN, Magali Delvalle APRN    hydrALAZINE (APRESOLINE) injection 10 mg, 10 mg, Intravenous, Q6H PRN, HARI Jimenez DO, 10 mg at 09/08/23 1023    hydrALAZINE (APRESOLINE) tablet 50 mg, 50 mg, Oral, Q8H, HARI Jimenez DO, 50 mg at 09/13/23 0639    insulin detemir (LEVEMIR) injection 5 Units, 5 Units, Subcutaneous, Daily, Chris Medrano MD, 5 Units at 09/13/23 0936    Insulin Lispro (humaLOG) injection 2-7 Units, 2-7 Units, Subcutaneous, 4x Daily AC & at Bedtime, Magali Delvalle APRN, 2 Units at 09/13/23 1319    lactobacillus acidophilus (RISAQUAD) capsule 1 capsule, 1 capsule, Oral, Daily, Magali Delvalle APRN, 1 capsule at 09/13/23 0940    Lidocaine 4 % 1 patch, 1  patch, Transdermal, Q24H, Faith Nelson MD, 1 patch at 09/13/23 0938    losartan (COZAAR) tablet 50 mg, 50 mg, Oral, Q24H, Chris Medrano MD, 50 mg at 09/12/23 0909    melatonin tablet 5 mg, 5 mg, Oral, Nightly PRN, Magali Delvalle APRN, 5 mg at 09/12/23 2017    mirtazapine (REMERON) tablet 7.5 mg, 7.5 mg, Oral, Nightly, Martin Martinez III, DO, 7.5 mg at 09/12/23 2017    multivitamin (THERAGRAN) tablet 1 tablet, 1 tablet, Oral, Daily, Magali Delvalle APRN, 1 tablet at 09/13/23 0940    ondansetron (ZOFRAN) tablet 4 mg, 4 mg, Oral, Q6H PRN, 4 mg at 09/06/23 2105 **OR** ondansetron (ZOFRAN) injection 4 mg, 4 mg, Intravenous, Q6H PRN, Magali Delvalle APRN, 4 mg at 09/07/23 2227    oxyCODONE-acetaminophen (PERCOCET) 5-325 MG per tablet 1 tablet, 1 tablet, Oral, Q6H PRN, Chris Medrano MD, 1 tablet at 09/13/23 1059    Potassium Replacement - Follow Nurse / BPA Driven Protocol, , Does not apply, PRN, Susan Cohen, APRN    prochlorperazine (COMPAZINE) injection 5 mg, 5 mg, Intravenous, Q6H PRN, 5 mg at 09/07/23 1120 **OR** prochlorperazine (COMPAZINE) tablet 5 mg, 5 mg, Oral, Q6H PRN, 5 mg at 09/07/23 2057 **OR** prochlorperazine (COMPAZINE) suppository 25 mg, 25 mg, Rectal, Q12H PRN, HARI Jimenez DO    rivaroxaban (XARELTO) tablet 15 mg, 15 mg, Oral, Daily With Dinner, Magali Delvalle APRN, 15 mg at 09/12/23 1653    [COMPLETED] Insert Peripheral IV, , , Once **AND** sodium chloride 0.9 % flush 10 mL, 10 mL, Intravenous, PRN, Nas Patel MD    sodium chloride 0.9 % flush 10 mL, 10 mL, Intravenous, Q12H, Magali Delvalle APRN, 10 mL at 09/11/23 2145    sodium chloride 0.9 % flush 10 mL, 10 mL, Intravenous, PRN, Magali Delvalle APRN    sodium chloride 0.9 % infusion 40 mL, 40 mL, Intravenous, PRN, Magali Delvalle APRN    terazosin (HYTRIN) capsule 1 mg, 1 mg, Oral, Nightly, Magali Delvalle APRN, 1 mg at 09/12/23 2017    vancomycin (VANCOCIN) capsule 125 mg, 125 mg, Oral, BID, Rene García PA, 125  mg at 23 0940    Antibiotics:  Anti-Infectives (From admission, onward)      Ordered     Dose/Rate Route Frequency Start Stop    23 1343  amoxicillin-clavulanate (AUGMENTIN) 875-125 MG per tablet 1 tablet        Ordering Provider: Pedro Tucker MD    1 tablet Oral Every 12 Hours Scheduled 23 1430 23 0859    23 1159  piperacillin-tazobactam (ZOSYN) 3.375 g in iso-osmotic dextrose 50 ml (premix)        Ordering Provider: Rene García PA    3.375 g  over 30 Minutes Intravenous Once 23 1245 23 1406    23 1856  DAPTOmycin (CUBICIN) 350 mg in sodium chloride 0.9 % 50 mL IVPB        Ordering Provider: Pedro Tucker MD    6 mg/kg × 62.3 kg (Adjusted)  100 mL/hr over 30 Minutes Intravenous Once 23 1945 23 2051    23 1119  vancomycin (VANCOCIN) capsule 125 mg        Ordering Provider: Rene García PA    125 mg Oral 2 Times Daily 23 1300 23 0859    23 0331  vancomycin 1250 mg/250 mL 0.9% NS IVPB ([facility])        Ordering Provider: Vanessa Francisco APRN    20 mg/kg × 59.7 kg  over 75 Minutes Intravenous Once 23 0430 23 0652    23 1233  fosfomycin (MONUROL) packet 3 g        Ordering Provider: Faith Nelson MD    3 g Oral Once 23 1330 23 1332              Review of Systems:  See above.       Physical Exam:   Vital Signs  Temp (24hrs), Av.5 °F (36.9 °C), Min:98 °F (36.7 °C), Max:99.1 °F (37.3 °C)    Temp  Min: 98 °F (36.7 °C)  Max: 99.1 °F (37.3 °C)  BP  Min: 128/61  Max: 148/64  Pulse  Min: 78  Max: 82  Resp  Min: 18  Max: 18  SpO2  Min: 99 %  Max: 100 %    GENERAL: Awake and alert, in no acute distress. Frail appearing  HEENT: Normocephalic, atraumatic.  No external oral lesions noted.  HEART: RRR; No murmur, rubs, gallops.   LUNGS: Diminished breath sounds in the lung bases.  Nonlabored breathing on room air.  ABDOMEN: Soft, nontender.  No rebound or guarding.  EXT:  No cyanosis, clubbing or edema. No  cord.  :  Without Ansari catheter.  MSK: No joint effusions or erythema  SKIN: No generalized rashes noted.  NEURO: Oriented to PPT. Normal speech and cognition  PSYCHIATRIC: Normal insight and judgment. Cooperative with PE    Laboratory Data    Results from last 7 days   Lab Units 09/13/23  0305 09/12/23  0349 09/11/23  0412   WBC 10*3/mm3 8.80 10.14 13.98*   HEMOGLOBIN g/dL 9.9* 9.8* 11.6*   HEMATOCRIT % 30.0* 30.0* 35.1   PLATELETS 10*3/mm3 470* 426 417     Results from last 7 days   Lab Units 09/13/23  0305   SODIUM mmol/L 143   POTASSIUM mmol/L 4.1   CHLORIDE mmol/L 110*   CO2 mmol/L 18.0*   BUN mg/dL 15   CREATININE mg/dL 1.11*   GLUCOSE mg/dL 142*   CALCIUM mg/dL 8.5*     Results from last 7 days   Lab Units 09/13/23  0305   ALK PHOS U/L 66   BILIRUBIN mg/dL 0.2   ALT (SGPT) U/L 5   AST (SGOT) U/L 9             Results from last 7 days   Lab Units 09/11/23  1602   LACTATE mmol/L 1.5     Results from last 7 days   Lab Units 09/10/23  0343   CK TOTAL U/L 45     Results from last 7 days   Lab Units 09/08/23  0511   VANCOMYCIN RM mcg/mL 8.30     Estimated Creatinine Clearance: 51.2 mL/min (A) (by C-G formula based on SCr of 1.11 mg/dL (H)).      Microbiology:  Microbiology Results (last 10 days)       Procedure Component Value - Date/Time    Urine Culture - Urine, Urine, Random Void [[id number]]  (Normal) Collected: 09/11/23 1536    Lab Status: Final result Specimen: Urine, Random Void Updated: 09/13/23 0747     Urine Culture No growth    Blood Culture - Blood, Hand, Right [[id number]]  (Normal) Collected: 09/09/23 1751    Lab Status: Preliminary result Specimen: Blood from Hand, Right Updated: 09/12/23 1845     Blood Culture No growth at 3 days    Blood Culture - Blood, Hand, Left [[id number]]  (Normal) Collected: 09/09/23 1747    Lab Status: Preliminary result Specimen: Blood from Hand, Left Updated: 09/12/23 7736     Blood Culture No growth at 3 days    Respiratory Panel PCR w/COVID-19(SARS-CoV-2)  [facility]-House, NP Swab in UTM/VTM, 3-4 HR TAT - Swab, Nasopharynx [[id number]]  (Normal) Collected: 09/08/23 1714    Lab Status: Final result Specimen: Swab from Nasopharynx Updated: 09/08/23 1818     ADENOVIRUS, PCR Not Detected     Coronavirus 229E Not Detected     Coronavirus HKU1 Not Detected     Coronavirus NL63 Not Detected     Coronavirus OC43 Not Detected     COVID19 Not Detected     Human Metapneumovirus Not Detected     Human Rhinovirus/Enterovirus Not Detected     Influenza A PCR Not Detected     Influenza B PCR Not Detected     Parainfluenza Virus 1 Not Detected     Parainfluenza Virus 2 Not Detected     Parainfluenza Virus 3 Not Detected     Parainfluenza Virus 4 Not Detected     RSV, PCR Not Detected     Bordetella pertussis pcr Not Detected     Bordetella parapertussis PCR Not Detected     Chlamydophila pneumoniae PCR Not Detected     Mycoplasma pneumo by PCR Not Detected    Narrative:      In the setting of a positive respiratory panel with a viral infection PLUS a negative procalcitonin without other underlying concern for bacterial infection, consider observing off antibiotics or discontinuation of antibiotics and continue supportive care. If the respiratory panel is positive for atypical bacterial infection (Bordetella pertussis, Chlamydophila pneumoniae, or Mycoplasma pneumoniae), consider antibiotic de-escalation to target atypical bacterial infection.    Gastrointestinal Panel, PCR - Stool, Per Rectum [[id number]]  (Abnormal) Collected: 09/07/23 1018    Lab Status: Final result Specimen: Stool from Per Rectum Updated: 09/07/23 1234     Campylobacter Not Detected     Plesiomonas shigelloides Not Detected     Salmonella Not Detected     Vibrio Not Detected     Vibrio cholerae Not Detected     Yersinia enterocolitica Not Detected     Enteroaggregative E. coli (EAEC) Detected     Enteropathogenic E. coli (EPEC) Not Detected     Enterotoxigenic E. coli (ETEC) lt/st Not Detected      Shiga-like toxin-producing E. coli (STEC) stx1/stx2 Not Detected     Shigella/Enteroinvasive E. coli (EIEC) Not Detected     Cryptosporidium Not Detected     Cyclospora cayetanensis Not Detected     Entamoeba histolytica Not Detected     Giardia lamblia Not Detected     Adenovirus F40/41 Not Detected     Astrovirus Not Detected     Norovirus GI/GII Not Detected     Rotavirus A Not Detected     Sapovirus (I, II, IV or V) Not Detected    Blood Culture - Blood, Hand, Left [[id number]]  (Normal) Collected: 09/07/23 0752    Lab Status: Final result Specimen: Blood from Hand, Left Updated: 09/12/23 0815     Blood Culture No growth at 5 days    Narrative:      Aerobic bottle only      Blood Culture - Blood, Hand, Right [[id number]]  (Normal) Collected: 09/07/23 0750    Lab Status: Final result Specimen: Blood from Hand, Right Updated: 09/12/23 0815     Blood Culture No growth at 5 days    Respiratory Panel PCR w/COVID-19(SARS-CoV-2) [facility]-House, NP Swab in UTM/VTM, 3-4 HR TAT - Swab, Nasopharynx [[id number]]  (Normal) Collected: 09/06/23 0620    Lab Status: Final result Specimen: Swab from Nasopharynx Updated: 09/06/23 0744     ADENOVIRUS, PCR Not Detected     Coronavirus 229E Not Detected     Coronavirus HKU1 Not Detected     Coronavirus NL63 Not Detected     Coronavirus OC43 Not Detected     COVID19 Not Detected     Human Metapneumovirus Not Detected     Human Rhinovirus/Enterovirus Not Detected     Influenza A PCR Not Detected     Influenza B PCR Not Detected     Parainfluenza Virus 1 Not Detected     Parainfluenza Virus 2 Not Detected     Parainfluenza Virus 3 Not Detected     Parainfluenza Virus 4 Not Detected     RSV, PCR Not Detected     Bordetella pertussis pcr Not Detected     Bordetella parapertussis PCR Not Detected     Chlamydophila pneumoniae PCR Not Detected     Mycoplasma pneumo by PCR Not Detected    Narrative:      In the setting of a positive respiratory panel with a viral  infection PLUS a negative procalcitonin without other underlying concern for bacterial infection, consider observing off antibiotics or discontinuation of antibiotics and continue supportive care. If the respiratory panel is positive for atypical bacterial infection (Bordetella pertussis, Chlamydophila pneumoniae, or Mycoplasma pneumoniae), consider antibiotic de-escalation to target atypical bacterial infection.    Blood Culture - Blood, Arm, Left [[id number]]  (Abnormal) Collected: 09/06/23 0600    Lab Status: Final result Specimen: Blood from Arm, Left Updated: 09/10/23 0606     Blood Culture Staphylococcus warneri     Isolated from Aerobic Bottle     Gram Stain Aerobic Bottle Gram positive cocci in groups    Narrative:      Probable contaminant requires clinical correlation, susceptibility not performed unless requested by physician.        Blood Culture ID, PCR - Blood, Arm, Left [[id number]]  (Abnormal) Collected: 09/06/23 0600    Lab Status: Final result Specimen: Blood from Arm, Left Updated: 09/07/23 0133     BCID, PCR Staph spp, not aureus or lugdunensis. Identification by BCID2 PCR.     BOTTLE TYPE Aerobic Bottle    Blood Culture - Blood, Arm, Left [[id number]]  (Abnormal) Collected: 09/06/23 0550    Lab Status: Final result Specimen: Blood from Arm, Left Updated: 09/10/23 0606     Blood Culture Staphylococcus hominis ssp hominis     Isolated from Aerobic Bottle     Gram Stain Aerobic Bottle Gram positive cocci in groups    Narrative:      Probable contaminant requires clinical correlation, susceptibility not performed unless requested by physician.        Blood Culture ID, PCR - Blood, Arm, Left [[id number]]  (Abnormal) Collected: 09/06/23 0550    Lab Status: Final result Specimen: Blood from Arm, Left Updated: 09/07/23 0324     BCID, PCR Staph spp, not aureus or lugdunensis. Identification by BCID2 PCR.     BOTTLE TYPE Aerobic Bottle    Urine Culture - Urine, Urine, Clean Catch [[id number]]  (Abnormal)   (Susceptibility) Collected: 09/05/23 0756    Lab Status: Final result Specimen: Urine, Clean Catch Updated: 09/08/23 0112     Urine Culture >100,000 CFU/mL Escherichia coli    Narrative:      Colonization of the urinary tract without infection is common. Treatment is discouraged unless the patient is symptomatic, pregnant, or undergoing an invasive urologic procedure.    Susceptibility        Escherichia coli      AMELIA      Ampicillin Susceptible      Ampicillin + Sulbactam Susceptible      Cefazolin Susceptible      Cefepime Susceptible      Ceftazidime Susceptible      Ceftriaxone Susceptible      Gentamicin Susceptible      Levofloxacin Susceptible      Nitrofurantoin Susceptible      Piperacillin + Tazobactam Susceptible      Trimethoprim + Sulfamethoxazole Susceptible                                         Radiology:    9/12/23 CT Chest:     FINDINGS: There is worsening fairly extensive infiltrate in the right lower lobe with multifocal patchy areas of consolidation. There is infiltrate in the posterior segment of the lateral right middle lobe with patchy area of consolidation, not included  on the CT abdomen of 9/11/2023 and new as compared to the prior CT chest. There is some slight scar in the left lung base. There are no significant infiltrates on the left.. Cystic lesion of the right thyroid is stable since the prior CT chest. There are   no enlarged mediastinal nodes. There are no pleural effusions.    Technique: Axial CT images were obtained of the chest without contrast administration.  Reconstructed coronal and sagittal images were also obtained. Automated exposure control and iterative construction methods were used.   Impression:     Impression:  Right middle and right lower lobe pneumonia.           Impression:   SIRS criteria with hectic fevers and tachycardia- now suspect this is due to a pulmonary source. improving  Coagulase negative Staph blood cultures contamination- repeat blood cultures are  no growth  Chronic recurrent Enteroaggregative E.coli positive diarrhea.  Usually self limiting, but likely colonizer.  Acute renal failure/CKD Stage IIIa, improved  H/o Cdiff 3/2022, also on 8/11, PCR was positive but toxin negative indicating colonization.  Type 1 diabetes mellitus, uncontrolled  Chronic Atrial fibrillation/Xarelto  Iron-deficiency anemia  Gastroparesis/gastric stimulator with acute flares.  H/o cerebrovascular accident/TIAs  Ongoing tobacco abuse, just quit about a week ago.  Recurrent prolonged QT, avoid antimicrobials that may prolong QT such as Levaquin, Cipro, azithromycin, fluconazole.  Protein calorie malnutrition  Right middle and lower lobe pneumonia- improving      PLAN/RECOMMENDATIONS:   Thank you for asking us to see Thelma Michael, I recommend the following:    - Vancomycin 125 mg PO BID for Cdiff prophylaxis  - continue Augmentin    I am ok with her discharge soon. May need inpatient rehab depending on how she is doing with PT.    UM/CAROL:  Continue Augmentin 875/125 mg PO BID until 9/18/23 for a 7 day course. Continue Vancomycin 125 mg PO BID for 2 more weeks. She will need to follow up with her PCP within 1-2 weeks after discharge.         Pedro Tucker MD  9/13/2023  17:31 EDT

## 2023-09-13 NOTE — CASE MANAGEMENT/SOCIAL WORK
Continued Stay Note  [facility]     Patient Name: Thelma Michael  MRN: [id number]  Today's Date: 9/13/2023    Admit Date: 9/5/2023    Plan: discharge plan   Discharge Plan       Row Name 09/13/23 1554       Plan    Plan discharge plan    Plan Comments I spoke with pt regarding discharge plan and PT/OT recommendations. Pt is agreeable to inpatient rehab and aware that her insurance only covers acute rehab. Referral made to [facility] and left April a message. CM will follow up tomorrow.    Final Discharge Disposition Code 62 - inpatient rehab facility                   Discharge Codes    No documentation.                 Expected Discharge Date and Time       Expected Discharge Date Expected Discharge Time    Sep 15, 2023               Nicky Baltazar RN

## 2023-09-13 NOTE — THERAPY TREATMENT NOTE
Patient Name: Thelma Michael  : 1960    MRN: [id number]                              Today's Date: 2023       Admit Date: 2023    Visit Dx:     ICD-10-CM ICD-9-CM   1. Hypertensive urgency  I16.0 401.9   2. Acute kidney injury  N17.9 584.9   3. Hyperglycemia  R73.9 790.29   4. Acute superficial gastritis without hemorrhage  K29.00 535.40     Patient Active Problem List   Diagnosis    Diabetic peripheral neuropathy    Hyperlipidemia    Hypertension    Osteoporosis    Tobacco use    Heart valve disease    Iron deficiency anemia secondary to inadequate dietary iron intake    Acute kidney injury    DKA (diabetic ketoacidoses)    Hypertensive urgency    Uncontrolled type 1 diabetes mellitus with hyperglycemia    Gastroparesis    PFO (patent foramen ovale)    Atrial fibrillation and flutter    Intractable vomiting    Hypokalemia    Refractory nausea and vomiting    HHS vs mild DKA    CKD (chronic kidney disease) stage 3, GFR 30-59 ml/min    Hypertensive urgency    Urinary retention    Family history of transient ischemic attacks    Type 1 diabetes mellitus with hypoglycemia with coma    Acute UTI (urinary tract infection)    Nausea vomiting and diarrhea    PAF (paroxysmal atrial fibrillation)    Anxiety associated with depression    Clavicle fracture     Past Medical History:   Diagnosis Date    Acid reflux     Acute bronchitis     Cardiac murmur     Depression with anxiety 10/5/2020    Diabetes mellitus     Gastroesophageal reflux disease 2016    H/O echocardiogram 2012    i. LVEF 65%.ii. Mild LVH.iii. Borderline evidence of atrial septal aneurysm.  No PFO.     History of nuclear stress test 2014    Negative for ischemia and scars; LVEF 77%.      History of TIAs 7/15/2021    Hyperlipidemia     Hypertension     Impacted cerumen of both ears     Migraine     Migraines 10/31/2019    Self-catheterizes urinary bladder     Sinusitis     Stroke     Tobacco abuse     quit 4 days ago.       Urticaria     Vitamin D deficiency 5/13/2016     Past Surgical History:   Procedure Laterality Date    CAPSULE ENDOSCOPY  07/27/2021    Procedure: PILLCAM DEPLOYMENT;  Surgeon: Mikael Worthy MD;  Location:  [facility];  Service: Gastroenterology;;    COLONOSCOPY      COLONOSCOPY N/A 07/27/2021    Procedure: COLONOSCOPY;  Surgeon: Mikael Worthy MD;  Location:  [facility];  Service: Gastroenterology;  Laterality: N/A;    DENTAL PROCEDURE      ENDOSCOPY N/A 06/30/2021    Procedure: ESOPHAGOGASTRODUODENOSCOPY;  Surgeon: Brunner, Mark I, MD;  Location:  [facility];  Service: Gastroenterology;  Laterality: N/A;    ENDOSCOPY N/A 07/27/2021    Procedure: ESOPHAGOGASTRODUODENOSCOPY;  Surgeon: Mikael Worthy MD;  Location:  [facility];  Service: Gastroenterology;  Laterality: N/A;    ENDOSCOPY WITH JTUBE N/A 03/16/2022    Procedure: ESOPHAGOGASTRODUODENOSCOPY WITH JEJUNAL TUBE INSERTION;  Surgeon: Thom Glover MD;  Location:  [facility];  Service: Gastroenterology;  Laterality: N/A;    UPPER GASTROINTESTINAL ENDOSCOPY        General Information       Row Name 09/13/23 0006          OT Time and Intention    Document Type therapy note (daily note)  -     Mode of Treatment occupational therapy  -       Row Name 09/13/23 0945          General Information    Patient Profile Reviewed yes  -     Existing Precautions/Restrictions fall;other (see comments)  L clavicle dx, LUE sling in room for OOB activity, NWB LUE.  -     Barriers to Rehab medically complex  -       Row Name 09/13/23 1678          Cognition    Orientation Status (Cognition) oriented x 3  -       Row Name 09/13/23 5180          Safety Issues, Functional Mobility    Safety Issues Affecting Function (Mobility) awareness of need for assistance;insight into deficits/self-awareness;safety precaution awareness;safety precautions follow-through/compliance;sequencing abilities  -     Impairments Affecting Function  (Mobility) balance;endurance/activity tolerance;postural/trunk control;strength;pain  -               User Key  (r) = Recorded By, (t) = Taken By, (c) = Cosigned By      Initials Name Provider Type     Julieta Mayfield OT Occupational Therapist                     Mobility/ADL's       Row Name 09/13/23 [facility]5          Bed Mobility    Bed Mobility supine-sit  -     Supine-Sit [place] (Bed Mobility) maximum assist (25% patient effort);1 person assist;verbal cues  -     Bed Mobility, Safety Issues decreased use of arms for pushing/pulling;decreased use of legs for bridging/pushing;impaired trunk control for bed mobility  -     Assistive Device (Bed Mobility) bed rails;draw sheet;head of bed elevated  -       Row Name 09/13/23 [facility]5          Transfers    Transfers sit-stand transfer;stand-sit transfer  -     Comment, (Transfers) Pt w/ noted posterior lean limiting standing activity.  -[facility] Name 09/13/23 [facility]5          Bed-Chair Transfer    Assistive Device (Bed-Chair Transfers) --  RUE support, gait belt  -[facility] Name 09/13/23 [facility]          Sit-Stand Transfer    Sit-Stand [place] (Transfers) moderate assist (50% patient effort);2 person assist;verbal cues  -     Assistive Device (Sit-Stand Transfers) other (see comments)  -       Row Name 09/13/23 [facility]          Stand-Sit Transfer    Stand-Sit [place] (Transfers) moderate assist (50% patient effort);2 person assist;verbal cues  -     Assistive Device (Stand-Sit Transfers) other (see comments)  -       Row Name 09/13/23 [facility]          Functional Mobility    Functional Mobility- Ind. Level moderate assist (50% patient effort);2 person assist required;verbal cues required  -     Functional Mobility- Device other (see comments)  -     Functional Mobility-Distance (Feet) 3  -     Functional Mobility- Safety Issues balance decreased during turns;sequencing ability decreased;step length decreased;loses balance backward  -        Row Name 09/13/23 1355          Activities of Daily Living    BADL Assessment/Intervention upper body dressing;feeding  -[facility] Name 09/13/23 1355          Upper Body Dressing Assessment/Training    [place] Level (Upper Body Dressing) don;other (see comments);maximum assist (25% patient effort);verbal cues  LUE sling  -     Position (Upper Body Dressing) sitting up in bed;edge of bed sitting  -       Row Name 09/13/23 1350          Self-Feeding Assessment/Training    [place] Level (Feeding) prepare tray/open items;scoop food and bring to mouth;liquids to mouth;set up  -     Position (Self-Feeding) supported sitting  -               User Key  (r) = Recorded By, (t) = Taken By, (c) = Cosigned By      Initials Name Provider Type    Julieta Rooney OT Occupational Therapist                   Obj/Interventions       [facility] Name 09/13/23 0587          Balance    Balance Assessment sitting static balance;sitting dynamic balance;sit to stand dynamic balance;standing static balance;standing dynamic balance  -     Static Sitting Balance contact guard;verbal cues  -     Dynamic Sitting Balance contact guard;verbal cues  -     Position, Sitting Balance unsupported;sitting edge of bed;sitting in chair  -     Sit to Stand Dynamic Balance moderate assist;2-person assist;verbal cues  -     Static Standing Balance moderate assist;2-person assist;verbal cues  -     Dynamic Standing Balance moderate assist;2-person assist;verbal cues  -     Position/Device Used, Standing Balance supported  -     Balance Interventions sitting;sit to stand;occupation based/functional task  -               User Key  (r) = Recorded By, (t) = Taken By, (c) = Cosigned By      Initials Name Provider Type    Julieta Rooney OT Occupational Therapist                   Goals/Plan    No documentation.                  Clinical Impression       Row Name 09/13/23 1357          Pain Assessment     Pretreatment Pain Rating 0/10 - no pain  -     Posttreatment Pain Rating 0/10 - no pain  -       Row Name 09/13/23 3614          Plan of Care Review    Plan of Care Reviewed With patient  -     Outcome Evaluation Pt presents w/ generalized weakness, balance deficits, and decreased functional endurance limiting her ADL independence. Will continue to progress pt as tolerated per OT POC. Rec IRF at d/c.  -       Row Name 09/13/23 5835          Therapy Assessment/Plan (OT)    Rehab Potential (OT) good, to achieve stated therapy goals  -     Criteria for Skilled Therapeutic Interventions Met (OT) yes;skilled treatment is necessary  -     Therapy Frequency (OT) daily  -       Row Name 09/13/23 1352          Therapy Plan Review/Discharge Plan (OT)    Anticipated Discharge Disposition (OT) inpatient rehabilitation facility  -[facility] Name 09/13/23 2716          Vital Signs    Pre Patient Position Supine  -     Intra Patient Position Standing  -     Post Patient Position Sitting  -[facility] Name 09/13/23 6298          Positioning and Restraints    Pre-Treatment Position in bed  -     Post Treatment Position chair  -     In Chair notified nsg;reclined;call light within reach;encouraged to call for assist;exit alarm on;waffle cushion;on mechanical lift sling;legs elevated;LUE elevated  -               User Key  (r) = Recorded By, (t) = Taken By, (c) = Cosigned By      Initials Name Provider Type    Julieta Rooney, OT Occupational Therapist                   Outcome Measures       Row Name 09/13/23 3937          How much help from another is currently needed...    Putting on and taking off regular lower body clothing? 2  -MC     Bathing (including washing, rinsing, and drying) 2  -MC     Toileting (which includes using toilet bed pan or urinal) 2  -MC     Putting on and taking off regular upper body clothing 2  -MC     Taking care of personal grooming (such as brushing teeth) 2  -MC      Eating meals 3  -     AM-[facility] 6 Clicks Score (OT) 13  -       Row Name 09/13/23 1358          Functional Assessment    Outcome Measure Options AM-PAC 6 Clicks Daily Activity (OT)  -               User Key  (r) = Recorded By, (t) = Taken By, (c) = Cosigned By      Initials Name Provider Type     Julieta Mayfield OT Occupational Therapist                    Occupational Therapy Education       Title: PT OT SLP Therapies (In Progress)       Topic: Occupational Therapy (In Progress)       Point: ADL training (In Progress)       Description:   Instruct learner(s) on proper safety adaptation and remediation techniques during self care or transfers.   Instruct in proper use of assistive devices.                  Learning Progress Summary             Patient Acceptance, E, NR by  at 9/13/2023 1358    Acceptance, E, NR by  at 9/12/2023 1419                         Point: Home exercise program (Not Started)       Description:   Instruct learner(s) on appropriate technique for monitoring, assisting and/or progressing therapeutic exercises/activities.                  Learner Progress:  Not documented in this visit.              Point: Precautions (In Progress)       Description:   Instruct learner(s) on prescribed precautions during self-care and functional transfers.                  Learning Progress Summary             Patient Acceptance, E, NR by  at 9/13/2023 1358    Acceptance, E, NR by  at 9/12/2023 1419                         Point: Body mechanics (In Progress)       Description:   Instruct learner(s) on proper positioning and spine alignment during self-care, functional mobility activities and/or exercises.                  Learning Progress Summary             Patient Acceptance, E, NR by  at 9/13/2023 1358    Acceptance, E, NR by  at 9/12/2023 1419                                         User Key       Initials Effective Dates Name Provider Type [facility] 10/14/22 -  Julieta Mayfield  OT Occupational Therapist OT                  OT Recommendation and Plan  Planned Therapy Interventions (OT): activity tolerance training, adaptive equipment training, BADL retraining, functional balance retraining, IADL retraining, occupation/activity based interventions, patient/caregiver education/training, ROM/therapeutic exercise, strengthening exercise, transfer/mobility retraining  Therapy Frequency (OT): daily  Plan of Care Review  Plan of Care Reviewed With: patient  Outcome Evaluation: Pt presents w/ generalized weakness, balance deficits, and decreased functional endurance limiting her ADL independence. Will continue to progress pt as tolerated per OT POC. Rec IRF at d/c.     Time Calculation:   Evaluation Complexity (OT)  Review Occupational Profile/Medical/Therapy History Complexity: expanded/moderate complexity  Assessment, Occupational Performance/Identification of Deficit Complexity: 3-5 performance deficits  Clinical Decision Making Complexity (OT): detailed assessment/moderate complexity  Overall Complexity of Evaluation (OT): moderate complexity     Time Calculation- OT       Row Name 09/13/23 1359             Time Calculation- OT    OT Start Time 1317  -MC      OT Received On 09/13/23  -      OT Goal Re-Cert Due Date 09/22/23  -         Timed Charges    13087 - OT Therapeutic Activity Minutes 18  -MC      40326 - OT Self Care/Mgmt Minutes 9  -MC         Total Minutes    Timed Charges Total Minutes 27  -MC       Total Minutes 27  -MC                User Key  (r) = Recorded By, (t) = Taken By, (c) = Cosigned By      Initials Name Provider Type     Julieta Mayfield OT Occupational Therapist                  Therapy Charges for Today       Code Description Service Date Service Provider Modifiers Qty    84956659664  OT SELF CARE/MGMT/TRAIN EA 15 MIN 9/12/2023 Julieta Mayfield OT GO 1    [id number]  OT EVAL MOD COMPLEXITY 3 9/12/2023 Julieta Mayfield OT GO 1    [id number]  OT  THERAPEUTIC ACT EA 15 MIN 9/13/2023 Julieta Mayfield OT GO 1    84760329515 HC OT SELF CARE/MGMT/TRAIN EA 15 MIN 9/13/2023 Julieta Mayfield OT GO 1    [id number] HC OT THER SUPP EA 15 MIN 9/13/2023 Julieta Mayfield OT GO 2                 Julieta Mayfield OT  9/13/2023

## 2023-09-13 NOTE — PROGRESS NOTES
INFECTIOUS DISEASE PROGRESS NOTE    Thelma Michael  1960  [id number]    Date of Consult: 9/9/2023    Admission Date: 9/5/2023      Requesting Provider: Skip Jimenez DO  Evaluating Physician: Pedro Tucker MD    Reason for Consultation: febrile, recent diarrhea/E. coli infection, ? Contaminated blood cultures    History of present illness:    Patient is a 62 y.o. female with h/o T1DM/poorly controlled, afib/Xarelto, anemia, gastroparesis/gastric stimulator, , GERD, TIAs, HLD, HTN, CKD Stage IIIa (baseline 0.9-1.25), migraines, CVA, malnutrition, former tobacco abuse/quit 1 year ago, chronic urinary retention/not self cathing for over a year, smokes marijuana occasionally for back pain control and appetite stimulator, and recent distal left clavicle fracture 9/1/23 who presented to [facility] ED on 9/5 for nausea, vomiting, and diarrhea for 2 days prior to arrival.  On arrival, she had 3 episodes of diarrhea in ED with decrease oral intake.  She denied fever, chills, shortness of breath, or dysuria.  She denies any hematemesis, hematochezia, or melena.  She had blood on toilet paper and has h/o hemorrhoids.  She has had fevers up to 102.5 last night.  Labs were WBC 7300 with 84% neutrophils, PCT 0.17, creatinine 1.22, A1C 11.2, and UA WBC 31-50 with moderate LE and negative nitrite.  Urine culture is positive for E.coli (pan sens).  Blood cultures are positive in 2 of 2 sets (2 of 4 bottles) with CoNS.  A GI panel PCR was positive for Enteroaggregative E.coli.  A respiratory panel PCR is negative. A CXR on 9/5 showed no acute findings. A CT scan of a/p without contrast on 9/6 showed no acute findings.  She was on oral Vancomycin from 9/5 to 9/6.  She was given a dose of Rocephin on 9/6 and a diose of IV Vancomycin on 9/7.  She is currently not on any antibiotic therapy.  ID was asked to evaluate.      Of note: she has not had a Cdiff done on this visit, but she had a Cdiff test on 8/11/23 with PCR  positive and toxin EIA negative.  She states they did do a test on this visit and it was negative. She also has had positive EAEC multiple times on 3/22/22, 3/26/22,12/10/22, 8/11/23, and now 9/7/23.  It appears that she has chronic issues with gastroparesis flares and diarrhea.  Her EAEC has never been treated because of her prolonged QT with Cipro and Azithromycin contraindicated.     9/10/23: Tmax 103.1. Fevers improved this morning. She states she had a watery stool early this morning.  Denies shortness of breath, vomiting, rashes.  Still with abdominal tenderness and nausea.     9/11/23: Tmax 101 today.  She continues to not feel well.  She had 1 watery stool this a.m. She denies n/v, shortness of breath, rashes, or dysuria.  She complains of mid abdominal pain.  She has no appetite but was will to have an Ensure. WBC worse at 13.98.  Creatinine improved to 1.19 with CrCl of 45.      9/12/23: feeling fatigued today and states that she does not feel any better. No worsening diarrhea. No worsening abdominal pain. Denies any shortness of breath or cough. No n/v. Fevers resolved today. Leukocytosis improved.     Past Medical History:   Diagnosis Date    Acid reflux     Acute bronchitis     Cardiac murmur     Depression with anxiety 10/5/2020    Diabetes mellitus     Gastroesophageal reflux disease 5/13/2016    H/O echocardiogram 08/07/2012    i. LVEF 65%.ii. Mild LVH.iii. Borderline evidence of atrial septal aneurysm.  No PFO.     History of nuclear stress test 08/22/2014    Negative for ischemia and scars; LVEF 77%.      History of TIAs 7/15/2021    Hyperlipidemia     Hypertension     Impacted cerumen of both ears     Migraine     Migraines 10/31/2019    Self-catheterizes urinary bladder     Sinusitis     Stroke     Tobacco abuse     quit 4 days ago.      Urticaria     Vitamin D deficiency 5/13/2016       Past Surgical History:   Procedure Laterality Date    CAPSULE ENDOSCOPY  07/27/2021    Procedure: PILLCAM  DEPLOYMENT;  Surgeon: Mikael Worthy MD;  Location:  [facility];  Service: Gastroenterology;;    COLONOSCOPY      COLONOSCOPY N/A 2021    Procedure: COLONOSCOPY;  Surgeon: Mikael Worthy MD;  Location:  [facility];  Service: Gastroenterology;  Laterality: N/A;    DENTAL PROCEDURE      ENDOSCOPY N/A 2021    Procedure: ESOPHAGOGASTRODUODENOSCOPY;  Surgeon: Brunner, Mark I, MD;  Location:  [facility];  Service: Gastroenterology;  Laterality: N/A;    ENDOSCOPY N/A 2021    Procedure: ESOPHAGOGASTRODUODENOSCOPY;  Surgeon: Mikael Worthy MD;  Location:  [facility];  Service: Gastroenterology;  Laterality: N/A;    ENDOSCOPY WITH JTUBE N/A 2022    Procedure: ESOPHAGOGASTRODUODENOSCOPY WITH JEJUNAL TUBE INSERTION;  Surgeon: Thom Glover MD;  Location:  [facility];  Service: Gastroenterology;  Laterality: N/A;    UPPER GASTROINTESTINAL ENDOSCOPY         Family History   Problem Relation Age of Onset    Anxiety disorder Other     Arthritis Other     ADD / ADHD Other     Heart disease Other         cardiac disorder    Depression Other     Diabetes Other     Hyperlipidemia Other     Hypertension Other     Lung cancer Other     Osteoporosis Other     Hypertension Brother     Diabetes Brother     Hyperlipidemia Mother     Hypertension Mother     Colon cancer Neg Hx        Social History     Socioeconomic History    Marital status:    Tobacco Use    Smoking status: Former     Packs/day: 0.25     Years: 30.00     Pack years: 7.50     Types: Cigarettes     Quit date: 2019     Years since quittin.2    Smokeless tobacco: Never    Tobacco comments:     BC PL never smoker    Vaping Use    Vaping Use: Never used   Substance and Sexual Activity    Alcohol use: Yes     Alcohol/week: 1.0 standard drink     Types: 1 Glasses of wine per week     Comment: ocassional    Drug use: Yes     Frequency: 2.0 times per week     Types: Marijuana    Sexual activity:  Not Currently     Comment: Single        No Known Allergies      Medication:    Current Facility-Administered Medications:     acetaminophen (TYLENOL) tablet 650 mg, 650 mg, Oral, Q6H PRN, HARI Jimenez DO, 650 mg at 09/11/23 0920    amLODIPine (NORVASC) tablet 10 mg, 10 mg, Oral, Q24H, Magali Delvalle APRN, 10 mg at 09/12/23 0908    amoxicillin-clavulanate (AUGMENTIN) 875-125 MG per tablet 1 tablet, 1 tablet, Oral, Q12H, Pedro Tucker MD, 1 tablet at 09/12/23 1422    carvedilol (COREG) tablet 12.5 mg, 12.5 mg, Oral, BID With Meals, Susan Cohen APRN, 12.5 mg at 09/12/23 1652    dextrose (D50W) (25 g/50 mL) IV injection 25 g, 25 g, Intravenous, Q15 Min PRN, Magali Delvalle APRN    dextrose (GLUTOSE) oral gel 15 g, 15 g, Oral, Q15 Min PRN, Magali Delvalle APRN    famotidine (PEPCID) tablet 20 mg, 20 mg, Oral, Vu HUMPHREY Stacy D, PharmD, 20 mg at 09/12/23 0909    ferrous sulfate tablet 325 mg, 325 mg, Oral, Daily With Breakfast, Magali Delvalle APRN, 325 mg at 09/12/23 0908    glucagon (GLUCAGEN) injection 1 mg, 1 mg, Intramuscular, Q15 Min PRN, Magali Delvalle APRN    hydrALAZINE (APRESOLINE) injection 10 mg, 10 mg, Intravenous, Q6H PRN, HARI Jimenez DO, 10 mg at 09/08/23 1023    hydrALAZINE (APRESOLINE) tablet 50 mg, 50 mg, Oral, Q8H, HARI Jimenez DO, 50 mg at 09/12/23 1422    insulin detemir (LEVEMIR) injection 5 Units, 5 Units, Subcutaneous, Daily, Chris Medrano MD    Insulin Lispro (humaLOG) injection 2-7 Units, 2-7 Units, Subcutaneous, 4x Daily AC & at Bedtime, Magali Delvalle APRN, 2 Units at 09/11/23 2144    lactobacillus acidophilus (RISAQUAD) capsule 1 capsule, 1 capsule, Oral, Daily, Magali Delvalle APRN, 1 capsule at 09/12/23 0909    Lidocaine 4 % 1 patch, 1 patch, Transdermal, Q24H, Faith Nelson MD, 1 patch at 09/12/23 0910    losartan (COZAAR) tablet 50 mg, 50 mg, Oral, Q24H, Chris Medrano MD, 50 mg at 09/12/23 0909    melatonin tablet 5 mg, 5 mg, Oral, Nightly PRN, Mook  HOWIE De Los Santos, 5 mg at 09/12/23 2017    mirtazapine (REMERON) tablet 7.5 mg, 7.5 mg, Oral, Nightly, Martin Martinez III, DO, 7.5 mg at 09/12/23 2017    multivitamin (THERAGRAN) tablet 1 tablet, 1 tablet, Oral, Daily, Magali Delvalle APRN, 1 tablet at 09/12/23 0909    ondansetron (ZOFRAN) tablet 4 mg, 4 mg, Oral, Q6H PRN, 4 mg at 09/06/23 2105 **OR** ondansetron (ZOFRAN) injection 4 mg, 4 mg, Intravenous, Q6H PRN, Magali Delvalle APRN, 4 mg at 09/07/23 2227    oxyCODONE-acetaminophen (PERCOCET) 5-325 MG per tablet 1 tablet, 1 tablet, Oral, Q6H PRN, Faith Nelson MD, 1 tablet at 09/10/23 2116    Potassium Replacement - Follow Nurse / BPA Driven Protocol, , Does not apply, PRN, Susan Cohen APRN    prochlorperazine (COMPAZINE) injection 5 mg, 5 mg, Intravenous, Q6H PRN, 5 mg at 09/07/23 1120 **OR** prochlorperazine (COMPAZINE) tablet 5 mg, 5 mg, Oral, Q6H PRN, 5 mg at 09/07/23 2057 **OR** prochlorperazine (COMPAZINE) suppository 25 mg, 25 mg, Rectal, Q12H PRN, HARI Jimenez DO    rivaroxaban (XARELTO) tablet 15 mg, 15 mg, Oral, Daily With Dinner, Magali Delvalle APRN, 15 mg at 09/12/23 1653    [COMPLETED] Insert Peripheral IV, , , Once **AND** sodium chloride 0.9 % flush 10 mL, 10 mL, Intravenous, PRN, Nas Patel MD    sodium chloride 0.9 % flush 10 mL, 10 mL, Intravenous, Q12H, Magali Delvalle APRN, 10 mL at 09/11/23 2145    sodium chloride 0.9 % flush 10 mL, 10 mL, Intravenous, PRN, Magali Delvalle APRN    sodium chloride 0.9 % infusion 40 mL, 40 mL, Intravenous, PRN, Magali Delvalle APRN    terazosin (HYTRIN) capsule 1 mg, 1 mg, Oral, Nightly, Magali Delvalle APRN, 1 mg at 09/12/23 2017    vancomycin (VANCOCIN) capsule 125 mg, 125 mg, Oral, BID, Rene García PA, 125 mg at 09/12/23 2017    Antibiotics:  Anti-Infectives (From admission, onward)      Ordered     Dose/Rate Route Frequency Start Stop    09/12/23 1343  amoxicillin-clavulanate (AUGMENTIN) 875-125 MG per tablet 1 tablet         Ordering Provider: Pedro Tucker MD    1 tablet Oral Every 12 Hours Scheduled 23 1430 23 0859    23 1159  piperacillin-tazobactam (ZOSYN) 3.375 g in iso-osmotic dextrose 50 ml (premix)        Ordering Provider: Rene García PA    3.375 g  over 30 Minutes Intravenous Once 23 1245 23 1406    23 1856  DAPTOmycin (CUBICIN) 350 mg in sodium chloride 0.9 % 50 mL IVPB        Ordering Provider: Pedro Tucker MD    6 mg/kg × 62.3 kg (Adjusted)  100 mL/hr over 30 Minutes Intravenous Once 23 1945 23 2051    23 1119  vancomycin (VANCOCIN) capsule 125 mg        Ordering Provider: Rene García PA    125 mg Oral 2 Times Daily 23 1300 23 0859    23 0331  vancomycin 1250 mg/250 mL 0.9% NS IVPB ([facility])        Ordering Provider: Vanessa Francisco APRN    20 mg/kg × 59.7 kg  over 75 Minutes Intravenous Once 23 0430 23 0652    23 1233  fosfomycin (MONUROL) packet 3 g        Ordering Provider: Faith Nelson MD    3 g Oral Once 23 1330 23 1332              Review of Systems:  See above.       Physical Exam:   Vital Signs  Temp (24hrs), Av.9 °F (37.2 °C), Min:98.6 °F (37 °C), Max:99.2 °F (37.3 °C)    Temp  Min: 98.6 °F (37 °C)  Max: 99.2 °F (37.3 °C)  BP  Min: 127/68  Max: 155/80  Pulse  Min: 70  Max: 82  Resp  Min: 16  Max: 18  SpO2  Min: 98 %  Max: 100 %    GENERAL: Awake and alert, in no acute distress. Frail appearing  HEENT: Normocephalic, atraumatic.  No conjunctival injection. No icterus. Oropharynx clear without evidence of thrush or exudate.   NECK: Supple without nuchal rigidity. No mass.  HEART: RRR; No murmur, rubs, gallops.   LUNGS: Clear to auscultation bilaterally without wheezing, rales, rhonchi. Normal respiratory effort. Nonlabored.   ABDOMEN: Soft, nontender.  No rebound or guarding.  EXT:  No cyanosis, clubbing or edema. No cord.  :  Without Ansari catheter.  MSK: No joint effusions or erythema  SKIN:  Warm and dry without cutaneous eruptions on Inspection/palpation.    NEURO: Oriented to PPT. Normal speech and cognition  PSYCHIATRIC: Normal insight and judgment. Cooperative with PE    Laboratory Data    Results from last 7 days   Lab Units 09/12/23  0349 09/11/23  0412 09/10/23  0343   WBC 10*3/mm3 10.14 13.98* 6.77   HEMOGLOBIN g/dL 9.8* 11.6* 10.4*   HEMATOCRIT % 30.0* 35.1 32.4*   PLATELETS 10*3/mm3 426 417 306     Results from last 7 days   Lab Units 09/12/23  0349   SODIUM mmol/L 141   POTASSIUM mmol/L 3.8   CHLORIDE mmol/L 109*   CO2 mmol/L 23.0   BUN mg/dL 14   CREATININE mg/dL 1.21*   GLUCOSE mg/dL 58*   CALCIUM mg/dL 8.4*     Results from last 7 days   Lab Units 09/12/23  0349   ALK PHOS U/L 69   BILIRUBIN mg/dL 0.2   ALT (SGPT) U/L 5   AST (SGOT) U/L 9             Results from last 7 days   Lab Units 09/11/23  1602   LACTATE mmol/L 1.5     Results from last 7 days   Lab Units 09/10/23  0343   CK TOTAL U/L 45     Results from last 7 days   Lab Units 09/08/23  0511   VANCOMYCIN RM mcg/mL 8.30     Estimated Creatinine Clearance: 47.2 mL/min (A) (by C-G formula based on SCr of 1.21 mg/dL (H)).      Microbiology:  Microbiology Results (last 10 days)       Procedure Component Value - Date/Time    Urine Culture - Urine, Urine, Random Void [[id number]]  (Normal) Collected: 09/11/23 1536    Lab Status: Preliminary result Specimen: Urine, Random Void Updated: 09/12/23 0800     Urine Culture No growth    Blood Culture - Blood, Hand, Right [[id number]]  (Normal) Collected: 09/09/23 1751    Lab Status: Preliminary result Specimen: Blood from Hand, Right Updated: 09/12/23 1845     Blood Culture No growth at 3 days    Blood Culture - Blood, Hand, Left [[id number]]  (Normal) Collected: 09/09/23 1747    Lab Status: Preliminary result Specimen: Blood from Hand, Left Updated: 09/12/23 1845     Blood Culture No growth at 3 days    Respiratory Panel PCR w/COVID-19(SARS-CoV-2) [facility]-House, NP Swab in  UTM/VTM, 3-4 HR TAT - Swab, Nasopharynx [[id number]]  (Normal) Collected: 09/08/23 1714    Lab Status: Final result Specimen: Swab from Nasopharynx Updated: 09/08/23 1818     ADENOVIRUS, PCR Not Detected     Coronavirus 229E Not Detected     Coronavirus HKU1 Not Detected     Coronavirus NL63 Not Detected     Coronavirus OC43 Not Detected     COVID19 Not Detected     Human Metapneumovirus Not Detected     Human Rhinovirus/Enterovirus Not Detected     Influenza A PCR Not Detected     Influenza B PCR Not Detected     Parainfluenza Virus 1 Not Detected     Parainfluenza Virus 2 Not Detected     Parainfluenza Virus 3 Not Detected     Parainfluenza Virus 4 Not Detected     RSV, PCR Not Detected     Bordetella pertussis pcr Not Detected     Bordetella parapertussis PCR Not Detected     Chlamydophila pneumoniae PCR Not Detected     Mycoplasma pneumo by PCR Not Detected    Narrative:      In the setting of a positive respiratory panel with a viral infection PLUS a negative procalcitonin without other underlying concern for bacterial infection, consider observing off antibiotics or discontinuation of antibiotics and continue supportive care. If the respiratory panel is positive for atypical bacterial infection (Bordetella pertussis, Chlamydophila pneumoniae, or Mycoplasma pneumoniae), consider antibiotic de-escalation to target atypical bacterial infection.    Gastrointestinal Panel, PCR - Stool, Per Rectum [[id number]]  (Abnormal) Collected: 09/07/23 1018    Lab Status: Final result Specimen: Stool from Per Rectum Updated: 09/07/23 1234     Campylobacter Not Detected     Plesiomonas shigelloides Not Detected     Salmonella Not Detected     Vibrio Not Detected     Vibrio cholerae Not Detected     Yersinia enterocolitica Not Detected     Enteroaggregative E. coli (EAEC) Detected     Enteropathogenic E. coli (EPEC) Not Detected     Enterotoxigenic E. coli (ETEC) lt/st Not Detected     Shiga-like toxin-producing E. coli (STEC)  stx1/stx2 Not Detected     Shigella/Enteroinvasive E. coli (EIEC) Not Detected     Cryptosporidium Not Detected     Cyclospora cayetanensis Not Detected     Entamoeba histolytica Not Detected     Giardia lamblia Not Detected     Adenovirus F40/41 Not Detected     Astrovirus Not Detected     Norovirus GI/GII Not Detected     Rotavirus A Not Detected     Sapovirus (I, II, IV or V) Not Detected    Blood Culture - Blood, Hand, Left [[id number]]  (Normal) Collected: 09/07/23 0752    Lab Status: Final result Specimen: Blood from Hand, Left Updated: 09/12/23 0815     Blood Culture No growth at 5 days    Narrative:      Aerobic bottle only      Blood Culture - Blood, Hand, Right [[id number]]  (Normal) Collected: 09/07/23 0750    Lab Status: Final result Specimen: Blood from Hand, Right Updated: 09/12/23 0815     Blood Culture No growth at 5 days    Respiratory Panel PCR w/COVID-19(SARS-CoV-2) [facility]-House, NP Swab in UTM/VTM, 3-4 HR TAT - Swab, Nasopharynx [[id number]]  (Normal) Collected: 09/06/23 0620    Lab Status: Final result Specimen: Swab from Nasopharynx Updated: 09/06/23 0744     ADENOVIRUS, PCR Not Detected     Coronavirus 229E Not Detected     Coronavirus HKU1 Not Detected     Coronavirus NL63 Not Detected     Coronavirus OC43 Not Detected     COVID19 Not Detected     Human Metapneumovirus Not Detected     Human Rhinovirus/Enterovirus Not Detected     Influenza A PCR Not Detected     Influenza B PCR Not Detected     Parainfluenza Virus 1 Not Detected     Parainfluenza Virus 2 Not Detected     Parainfluenza Virus 3 Not Detected     Parainfluenza Virus 4 Not Detected     RSV, PCR Not Detected     Bordetella pertussis pcr Not Detected     Bordetella parapertussis PCR Not Detected     Chlamydophila pneumoniae PCR Not Detected     Mycoplasma pneumo by PCR Not Detected    Narrative:      In the setting of a positive respiratory panel with a viral infection PLUS a negative procalcitonin without  other underlying concern for bacterial infection, consider observing off antibiotics or discontinuation of antibiotics and continue supportive care. If the respiratory panel is positive for atypical bacterial infection (Bordetella pertussis, Chlamydophila pneumoniae, or Mycoplasma pneumoniae), consider antibiotic de-escalation to target atypical bacterial infection.    Blood Culture - Blood, Arm, Left [[id number]]  (Abnormal) Collected: 09/06/23 0600    Lab Status: Final result Specimen: Blood from Arm, Left Updated: 09/10/23 0606     Blood Culture Staphylococcus warneri     Isolated from Aerobic Bottle     Gram Stain Aerobic Bottle Gram positive cocci in groups    Narrative:      Probable contaminant requires clinical correlation, susceptibility not performed unless requested by physician.        Blood Culture ID, PCR - Blood, Arm, Left [[id number]]  (Abnormal) Collected: 09/06/23 0600    Lab Status: Final result Specimen: Blood from Arm, Left Updated: 09/07/23 0133     BCID, PCR Staph spp, not aureus or lugdunensis. Identification by BCID2 PCR.     BOTTLE TYPE Aerobic Bottle    Blood Culture - Blood, Arm, Left [[id number]]  (Abnormal) Collected: 09/06/23 0550    Lab Status: Final result Specimen: Blood from Arm, Left Updated: 09/10/23 0606     Blood Culture Staphylococcus hominis ssp hominis     Isolated from Aerobic Bottle     Gram Stain Aerobic Bottle Gram positive cocci in groups    Narrative:      Probable contaminant requires clinical correlation, susceptibility not performed unless requested by physician.        Blood Culture ID, PCR - Blood, Arm, Left [[id number]]  (Abnormal) Collected: 09/06/23 0550    Lab Status: Final result Specimen: Blood from Arm, Left Updated: 09/07/23 0324     BCID, PCR Staph spp, not aureus or lugdunensis. Identification by BCID2 PCR.     BOTTLE TYPE Aerobic Bottle    Urine Culture - Urine, Urine, Clean Catch [[id number]]  (Abnormal)  (Susceptibility) Collected: 09/05/23 2309    Lab  Status: Final result Specimen: Urine, Clean Catch Updated: 09/08/23 0112     Urine Culture >100,000 CFU/mL Escherichia coli    Narrative:      Colonization of the urinary tract without infection is common. Treatment is discouraged unless the patient is symptomatic, pregnant, or undergoing an invasive urologic procedure.    Susceptibility        Escherichia coli      AMELIA      Ampicillin Susceptible      Ampicillin + Sulbactam Susceptible      Cefazolin Susceptible      Cefepime Susceptible      Ceftazidime Susceptible      Ceftriaxone Susceptible      Gentamicin Susceptible      Levofloxacin Susceptible      Nitrofurantoin Susceptible      Piperacillin + Tazobactam Susceptible      Trimethoprim + Sulfamethoxazole Susceptible                                         Radiology:    9/12/23 CT Chest:     FINDINGS: There is worsening fairly extensive infiltrate in the right lower lobe with multifocal patchy areas of consolidation. There is infiltrate in the posterior segment of the lateral right middle lobe with patchy area of consolidation, not included  on the CT abdomen of 9/11/2023 and new as compared to the prior CT chest. There is some slight scar in the left lung base. There are no significant infiltrates on the left.. Cystic lesion of the right thyroid is stable since the prior CT chest. There are   no enlarged mediastinal nodes. There are no pleural effusions.    Technique: Axial CT images were obtained of the chest without contrast administration.  Reconstructed coronal and sagittal images were also obtained. Automated exposure control and iterative construction methods were used.   Impression:     Impression:  Right middle and right lower lobe pneumonia.       I independently read the patient's CT chest from today- she has right middle and lower lobe pneumonia    Impression:   SIRS criteria with hectic fevers and tachycardia- now suspect this is due to a pulmonary source. improving  Coagulase negative Staph  blood cultures contamination- repeat blood cultures are no growth  Chronic recurrent Enteroaggregative E.coli positive diarrhea.  Usually self limiting, but likely colonizer.  Acute renal failure/CKD Stage IIIa, improved  H/o Cdiff 3/2022, also on 8/11, PCR was positive but toxin negative indicating colonization.  Type 1 diabetes mellitus, uncontrolled  Chronic Atrial fibrillation/Xarelto  Iron-deficiency anemia  Gastroparesis/gastric stimulator with acute flares.  H/o cerebrovascular accident/TIAs  Ongoing tobacco abuse, just quit about a week ago.  Recurrent prolonged QT, avoid antimicrobials that may prolong QT such as Levaquin, Cipro, azithromycin, fluconazole.  Protein calorie malnutrition  Right middle and lower lobe pneumonia- ongoing issue. Likely cause of recent sepsis. Respiratory status is stable on room air.      PLAN/RECOMMENDATIONS:   Thank you for asking us to see Thelma Michael, I recommend the following:  - Continue to monitor CBC closely  - Vancomycin 125 mg PO BID for Cdiff prophylaxis  - stop zosyn given she has lost PIV access and is not a great candidate for a PICC line    Will try transition to oral Augmentin 875/125 mg PO BID. If she does well on this then we could likely complete a 6 day course (7 days of coverage for her pneumonia). If she starts having fevers again then we may need to consider other IV access or try IM Ertapenem given she was previously having fevers and worsening leukocytosis despite ceftriaxone.     I discussed with nursing staff today    I discussed with Dr. Medrano today    Pedro Tucker MD  9/12/2023  20:51 EDT

## 2023-09-14 LAB
ALBUMIN SERPL-MCNC: 2.9 G/DL (ref 3.5–5.2)
ALBUMIN/GLOB SERPL: 0.9 G/DL
ALP SERPL-CCNC: 65 U/L (ref 39–117)
ALT SERPL W P-5'-P-CCNC: <5 U/L (ref 1–33)
ANION GAP SERPL CALCULATED.3IONS-SCNC: 11 MMOL/L (ref 5–15)
AST SERPL-CCNC: 12 U/L (ref 1–32)
BACTERIA SPEC AEROBE CULT: NORMAL
BACTERIA SPEC AEROBE CULT: NORMAL
BASOPHILS # BLD AUTO: 0.02 10*3/MM3 (ref 0–0.2)
BASOPHILS NFR BLD AUTO: 0.2 % (ref 0–1.5)
BILIRUB SERPL-MCNC: 0.2 MG/DL (ref 0–1.2)
BUN SERPL-MCNC: 20 MG/DL (ref 8–23)
BUN/CREAT SERPL: 16.9 (ref 7–25)
CALCIUM SPEC-SCNC: 8.2 MG/DL (ref 8.6–10.5)
CHLORIDE SERPL-SCNC: 106 MMOL/L (ref 98–107)
CO2 SERPL-SCNC: 24 MMOL/L (ref 22–29)
CREAT SERPL-MCNC: 1.18 MG/DL (ref 0.57–1)
DEPRECATED RDW RBC AUTO: 45.4 FL (ref 37–54)
EGFRCR SERPLBLD CKD-EPI 2021: 51.7 ML/MIN/1.73
EOSINOPHIL # BLD AUTO: 0.12 10*3/MM3 (ref 0–0.4)
EOSINOPHIL NFR BLD AUTO: 1.4 % (ref 0.3–6.2)
ERYTHROCYTE [DISTWIDTH] IN BLOOD BY AUTOMATED COUNT: 14 % (ref 12.3–15.4)
GLOBULIN UR ELPH-MCNC: 3.1 GM/DL
GLUCOSE BLDC GLUCOMTR-MCNC: 150 MG/DL (ref 70–130)
GLUCOSE BLDC GLUCOMTR-MCNC: 183 MG/DL (ref 70–130)
GLUCOSE BLDC GLUCOMTR-MCNC: 187 MG/DL (ref 70–130)
GLUCOSE BLDC GLUCOMTR-MCNC: 225 MG/DL (ref 70–130)
GLUCOSE SERPL-MCNC: 160 MG/DL (ref 65–99)
HCT VFR BLD AUTO: 30.4 % (ref 34–46.6)
HGB BLD-MCNC: 9.9 G/DL (ref 12–15.9)
IMM GRANULOCYTES # BLD AUTO: 0.15 10*3/MM3 (ref 0–0.05)
IMM GRANULOCYTES NFR BLD AUTO: 1.8 % (ref 0–0.5)
LYMPHOCYTES # BLD AUTO: 1.96 10*3/MM3 (ref 0.7–3.1)
LYMPHOCYTES NFR BLD AUTO: 23.5 % (ref 19.6–45.3)
MCH RBC QN AUTO: 29.1 PG (ref 26.6–33)
MCHC RBC AUTO-ENTMCNC: 32.6 G/DL (ref 31.5–35.7)
MCV RBC AUTO: 89.4 FL (ref 79–97)
MONOCYTES # BLD AUTO: 0.78 10*3/MM3 (ref 0.1–0.9)
MONOCYTES NFR BLD AUTO: 9.3 % (ref 5–12)
NEUTROPHILS NFR BLD AUTO: 5.32 10*3/MM3 (ref 1.7–7)
NEUTROPHILS NFR BLD AUTO: 63.8 % (ref 42.7–76)
NRBC BLD AUTO-RTO: 0 /100 WBC (ref 0–0.2)
PLATELET # BLD AUTO: 530 10*3/MM3 (ref 140–450)
PMV BLD AUTO: 10.3 FL (ref 6–12)
POTASSIUM SERPL-SCNC: 4 MMOL/L (ref 3.5–5.2)
PROT SERPL-MCNC: 6 G/DL (ref 6–8.5)
RBC # BLD AUTO: 3.4 10*6/MM3 (ref 3.77–5.28)
SODIUM SERPL-SCNC: 141 MMOL/L (ref 136–145)
WBC NRBC COR # BLD: 8.35 10*3/MM3 (ref 3.4–10.8)

## 2023-09-14 PROCEDURE — 80053 COMPREHEN METABOLIC PANEL: CPT | Performed by: FAMILY MEDICINE

## 2023-09-14 PROCEDURE — 63710000001 INSULIN LISPRO (HUMAN) PER 5 UNITS: Performed by: NURSE PRACTITIONER

## 2023-09-14 PROCEDURE — 63710000001 INSULIN DETEMIR PER 5 UNITS: Performed by: INTERNAL MEDICINE

## 2023-09-14 PROCEDURE — 97110 THERAPEUTIC EXERCISES: CPT

## 2023-09-14 PROCEDURE — 97530 THERAPEUTIC ACTIVITIES: CPT

## 2023-09-14 PROCEDURE — 82948 REAGENT STRIP/BLOOD GLUCOSE: CPT

## 2023-09-14 PROCEDURE — 85025 COMPLETE CBC W/AUTO DIFF WBC: CPT | Performed by: FAMILY MEDICINE

## 2023-09-14 PROCEDURE — 99231 SBSQ HOSP IP/OBS SF/LOW 25: CPT | Performed by: NURSE PRACTITIONER

## 2023-09-14 RX ORDER — AMOXICILLIN AND CLAVULANATE POTASSIUM 875; 125 MG/1; MG/1
1 TABLET, FILM COATED ORAL EVERY 12 HOURS SCHEDULED
Status: CANCELLED | OUTPATIENT
Start: 2023-09-14 | End: 2023-09-15

## 2023-09-14 RX ADMIN — VANCOMYCIN HYDROCHLORIDE 125 MG: 125 CAPSULE ORAL at 09:51

## 2023-09-14 RX ADMIN — FERROUS SULFATE TAB 325 MG (65 MG ELEMENTAL FE) 325 MG: 325 (65 FE) TAB at 09:51

## 2023-09-14 RX ADMIN — FAMOTIDINE 20 MG: 20 TABLET ORAL at 06:03

## 2023-09-14 RX ADMIN — CARVEDILOL 12.5 MG: 12.5 TABLET, FILM COATED ORAL at 09:50

## 2023-09-14 RX ADMIN — AMOXICILLIN AND CLAVULANATE POTASSIUM 1 TABLET: 875; 125 TABLET, FILM COATED ORAL at 21:34

## 2023-09-14 RX ADMIN — RIVAROXABAN 15 MG: 15 TABLET, FILM COATED ORAL at 16:54

## 2023-09-14 RX ADMIN — INSULIN LISPRO 2 UNITS: 100 INJECTION, SOLUTION INTRAVENOUS; SUBCUTANEOUS at 09:51

## 2023-09-14 RX ADMIN — HYDRALAZINE HYDROCHLORIDE 50 MG: 50 TABLET, FILM COATED ORAL at 21:34

## 2023-09-14 RX ADMIN — HYDRALAZINE HYDROCHLORIDE 50 MG: 50 TABLET, FILM COATED ORAL at 06:03

## 2023-09-14 RX ADMIN — HYDRALAZINE HYDROCHLORIDE 50 MG: 50 TABLET, FILM COATED ORAL at 16:54

## 2023-09-14 RX ADMIN — LOSARTAN POTASSIUM 50 MG: 50 TABLET, FILM COATED ORAL at 09:51

## 2023-09-14 RX ADMIN — OXYCODONE HYDROCHLORIDE AND ACETAMINOPHEN 1 TABLET: 5; 325 TABLET ORAL at 21:45

## 2023-09-14 RX ADMIN — Medication 1 CAPSULE: at 09:51

## 2023-09-14 RX ADMIN — AMOXICILLIN AND CLAVULANATE POTASSIUM 1 TABLET: 875; 125 TABLET, FILM COATED ORAL at 09:50

## 2023-09-14 RX ADMIN — CARVEDILOL 12.5 MG: 12.5 TABLET, FILM COATED ORAL at 16:54

## 2023-09-14 RX ADMIN — MULTIVITAMIN TABLET 1 TABLET: TABLET at 09:50

## 2023-09-14 RX ADMIN — TERAZOSIN HYDROCHLORIDE 1 MG: 1 CAPSULE ORAL at 21:34

## 2023-09-14 RX ADMIN — VANCOMYCIN HYDROCHLORIDE 125 MG: 125 CAPSULE ORAL at 21:34

## 2023-09-14 RX ADMIN — AMLODIPINE BESYLATE 10 MG: 10 TABLET ORAL at 09:50

## 2023-09-14 RX ADMIN — Medication 5 MG: at 21:34

## 2023-09-14 RX ADMIN — MIRTAZAPINE 7.5 MG: 15 TABLET, FILM COATED ORAL at 21:34

## 2023-09-14 RX ADMIN — INSULIN DETEMIR 5 UNITS: 100 INJECTION, SOLUTION SUBCUTANEOUS at 09:51

## 2023-09-14 RX ADMIN — INSULIN LISPRO 3 UNITS: 100 INJECTION, SOLUTION INTRAVENOUS; SUBCUTANEOUS at 16:54

## 2023-09-14 RX ADMIN — INSULIN LISPRO 3 UNITS: 100 INJECTION, SOLUTION INTRAVENOUS; SUBCUTANEOUS at 21:34

## 2023-09-14 NOTE — PROGRESS NOTES
[facility] Medicine Services  PROGRESS NOTE    Patient Name: Thelma Michael  : 1960  MRN: [id number]    Date of Admission: 2023  Primary Care Physician: Monet Howe APRN    Subjective   Subjective     CC: Follow-up pneumonia, fever      HPI:   Pt resting in bed, denies abd pain, nausea. She had a episode of diarrhea this morning.     Copied text in this note has been reviewed and is accurate as of 23.       Objective   Objective     Vital Signs:   Temp:  [97.8 °F (36.6 °C)-99.1 °F (37.3 °C)] 97.8 °F (36.6 °C)  Heart Rate:  [72-82] 73  Resp:  [16-18] 16  BP: (117-183)/(57-81) 131/61     Physical Exam:  Constitutional: Awake, chronically ill appearing, resting in bed  HENT: NCAT, mucous membranes moist  Respiratory: Clear to auscultation bilaterally, nonlabored 96% RA  Cardiovascular: RRR, no murmurs, rubs, or gallop HR 77  Gastrointestinal: Positive bowel sounds, soft, nontender, nondistended  Musculoskeletal: No bilateral ankle edema  Psychiatric: Flat affect, cooperative  Neurologic: Oriented x 3, non focal, MARCUM, speech clear  Skin: No rashes    Results Reviewed:  LAB RESULTS:      Lab 23  0300 23  0305 23  0349 23  1602 23  0412 09/10/23  0343 23  1747 23  1626   WBC 8.35 8.80 10.14  --  13.98* 6.77  --  6.47   HEMOGLOBIN 9.9* 9.9* 9.8*  --  11.6* 10.4*  --  11.6*   HEMATOCRIT 30.4* 30.0* 30.0*  --  35.1 32.4*  --  36.3   PLATELETS 530* 470* 426  --  417 306  --  337   NEUTROS ABS 5.32 6.22 7.43*  --  11.51* 3.68  --  3.88   IMMATURE GRANS (ABS) 0.15* 0.12* 0.08*  --  0.06* 0.04  --  0.02   LYMPHS ABS 1.96 1.61 1.53  --  1.40 2.27  --  1.89   MONOS ABS 0.78 0.69 0.91*  --  0.96* 0.73  --  0.67   EOS ABS 0.12 0.12 0.17  --  0.02 0.02  --  0.00   MCV 89.4 89.6 89.8  --  88.6 90.8  --  90.1   PROCALCITONIN  --   --   --  0.15  --   --   --  0.15   LACTATE  --   --   --  1.5  --   --  1.0 0.7           Lab  09/14/23  0300 09/13/23  0305 09/12/23  0349 09/11/23  0412 09/10/23  1545 09/10/23  0343 09/08/23  1626   SODIUM 141 143 141 143  --  141 145   POTASSIUM 4.0 4.1 3.8 4.1 5.2 3.4* 3.9   CHLORIDE 106 110* 109* 112*  --  111* 110*   CO2 24.0 18.0* 23.0 22.0  --  22.0 20.0*   ANION GAP 11.0 15.0 9.0 9.0  --  8.0 15.0   BUN 20 15 14 15  --  25* 17   CREATININE 1.18* 1.11* 1.21* 1.19*  --  1.50* 1.30*   EGFR 51.7* 55.6* 50.2* 51.2*  --  38.8* 46.0*   GLUCOSE 160* 142* 58* 93  --  76 127*   CALCIUM 8.2* 8.5* 8.4* 8.8  --  8.2* 8.6   MAGNESIUM  --   --  2.1 1.9  --  2.1 1.8           Lab 09/14/23  0300 09/13/23  0305 09/12/23  0349 09/11/23  0412 09/10/23  0343   TOTAL PROTEIN 6.0 6.1 5.9* 6.6 5.7*   ALBUMIN 2.9* 2.8* 2.8* 3.2* 2.7*   GLOBULIN 3.1 3.3 3.1 3.4 3.0   ALT (SGPT) <5 5 5 7 7   AST (SGOT) 12 9 9 13 11   BILIRUBIN 0.2 0.2 0.2 0.3 0.3   ALK PHOS 65 66 69 79 62                       Brief Urine Lab Results  (Last result in the past 365 days)        Color   Clarity   Blood   Leuk Est   Nitrite   Protein   CREAT   Urine HCG        09/11/23 1536 Yellow   Turbid   Large (3+)   Large (3+)   Negative   100 mg/dL (2+)                   Microbiology Results Abnormal       Procedure Component Value - Date/Time    Blood Culture - Blood, Hand, Left [[id number]]  (Normal) Collected: 09/09/23 1747    Lab Status: Preliminary result Specimen: Blood from Hand, Left Updated: 09/13/23 1845     Blood Culture No growth at 4 days    Blood Culture - Blood, Hand, Right [[id number]]  (Normal) Collected: 09/09/23 1751    Lab Status: Preliminary result Specimen: Blood from Hand, Right Updated: 09/13/23 1845     Blood Culture No growth at 4 days    Urine Culture - Urine, Urine, Random Void [[id number]]  (Normal) Collected: 09/11/23 1536    Lab Status: Final result Specimen: Urine, Random Void Updated: 09/13/23 0747     Urine Culture No growth    Blood Culture - Blood, Hand, Left [[id number]]  (Normal) Collected: 09/07/23 0752    Lab Status:  Final result Specimen: Blood from Hand, Left Updated: 09/12/23 0815     Blood Culture No growth at 5 days    Narrative:      Aerobic bottle only      Blood Culture - Blood, Hand, Right [[id number]]  (Normal) Collected: 09/07/23 0750    Lab Status: Final result Specimen: Blood from Hand, Right Updated: 09/12/23 0815     Blood Culture No growth at 5 days    Respiratory Panel PCR w/COVID-19(SARS-CoV-2) [facility]MA/JUAN ALBERTO/EDY/PAD/[facility]/ROSALINA In-House, NP Swab in UTM/VTM, 3-4 HR TAT - Swab, Nasopharynx [[id number]]  (Normal) Collected: 09/08/23 1714    Lab Status: Final result Specimen: Swab from Nasopharynx Updated: 09/08/23 1818     ADENOVIRUS, PCR Not Detected     Coronavirus 229E Not Detected     Coronavirus HKU1 Not Detected     Coronavirus NL63 Not Detected     Coronavirus OC43 Not Detected     COVID19 Not Detected     Human Metapneumovirus Not Detected     Human Rhinovirus/Enterovirus Not Detected     Influenza A PCR Not Detected     Influenza B PCR Not Detected     Parainfluenza Virus 1 Not Detected     Parainfluenza Virus 2 Not Detected     Parainfluenza Virus 3 Not Detected     Parainfluenza Virus 4 Not Detected     RSV, PCR Not Detected     Bordetella pertussis pcr Not Detected     Bordetella parapertussis PCR Not Detected     Chlamydophila pneumoniae PCR Not Detected     Mycoplasma pneumo by PCR Not Detected    Narrative:      In the setting of a positive respiratory panel with a viral infection PLUS a negative procalcitonin without other underlying concern for bacterial infection, consider observing off antibiotics or discontinuation of antibiotics and continue supportive care. If the respiratory panel is positive for atypical bacterial infection (Bordetella pertussis, Chlamydophila pneumoniae, or Mycoplasma pneumoniae), consider antibiotic de-escalation to target atypical bacterial infection.    Respiratory Panel PCR w/COVID-19(SARS-CoV-2) [facility]/JUAN ALBERTO/EDY/[facility]ALINA In-House, NP Swab in UTM/VTM, 3-4 HR TAT - Swab,  Nasopharynx [[id number]]  (Normal) Collected: 09/06/23 0620    Lab Status: Final result Specimen: Swab from Nasopharynx Updated: 09/06/23 0744     ADENOVIRUS, PCR Not Detected     Coronavirus 229E Not Detected     Coronavirus HKU1 Not Detected     Coronavirus NL63 Not Detected     Coronavirus OC43 Not Detected     COVID19 Not Detected     Human Metapneumovirus Not Detected     Human Rhinovirus/Enterovirus Not Detected     Influenza A PCR Not Detected     Influenza B PCR Not Detected     Parainfluenza Virus 1 Not Detected     Parainfluenza Virus 2 Not Detected     Parainfluenza Virus 3 Not Detected     Parainfluenza Virus 4 Not Detected     RSV, PCR Not Detected     Bordetella pertussis pcr Not Detected     Bordetella parapertussis PCR Not Detected     Chlamydophila pneumoniae PCR Not Detected     Mycoplasma pneumo by PCR Not Detected    Narrative:      In the setting of a positive respiratory panel with a viral infection PLUS a negative procalcitonin without other underlying concern for bacterial infection, consider observing off antibiotics or discontinuation of antibiotics and continue supportive care. If the respiratory panel is positive for atypical bacterial infection (Bordetella pertussis, Chlamydophila pneumoniae, or Mycoplasma pneumoniae), consider antibiotic de-escalation to target atypical bacterial infection.            CT Chest Without Contrast Diagnostic    Result Date: 9/12/2023  CT CHEST WO CONTRAST DIAGNOSTIC Date of Exam: 9/12/2023 10:05 AM EDT Indication: Pneumonia, complication suspected, xray done Follow up possible PNA on CT abd/pelvis. Comparison: CT abdomen of 9/11/2023. Prior CT chest is dated February 4, 2023 FINDINGS: There is worsening fairly extensive infiltrate in the right lower lobe with multifocal patchy areas of consolidation. There is infiltrate in the posterior segment of the lateral right middle lobe with patchy area of consolidation, not included on the CT abdomen of 9/11/2023 and  new as compared to the prior CT chest. There is some slight scar in the left lung base. There are no significant infiltrates on the left.. Cystic lesion of the right thyroid is stable since the prior CT chest. There are  no enlarged mediastinal nodes. There are no pleural effusions. Technique: Axial CT images were obtained of the chest without contrast administration.  Reconstructed coronal and sagittal images were also obtained. Automated exposure control and iterative construction methods were used.     Impression: Impression: Right middle and right lower lobe pneumonia. Electronically Signed: Kerri Peters MD  9/12/2023 11:17 AM EDT  Workstation ID: [id number]     Results for orders placed during the hospital encounter of 12/06/22    Adult Transthoracic Echo Complete W/ Cont if Necessary Per Protocol    Interpretation Summary    Left ventricular ejection fraction appears to be greater than 70%.    Left ventricular wall thickness is consistent with moderate concentric hypertrophy.    Left ventricular diastolic function is consistent with (grade I) impaired relaxation.    Estimated right ventricular systolic pressure from tricuspid regurgitation is mildly elevated (35-45 mmHg). Calculated right ventricular systolic pressure from tricuspid regurgitation is 39 mmHg.      Current medications:  Scheduled Meds:amLODIPine, 10 mg, Oral, Q24H  amoxicillin-clavulanate, 1 tablet, Oral, Q12H  carvedilol, 12.5 mg, Oral, BID With Meals  famotidine, 20 mg, Oral, QAM  ferrous sulfate, 325 mg, Oral, Daily With Breakfast  hydrALAZINE, 50 mg, Oral, Q8H  insulin detemir, 5 Units, Subcutaneous, Daily  insulin lispro, 2-7 Units, Subcutaneous, 4x Daily AC & at Bedtime  lactobacillus acidophilus, 1 capsule, Oral, Daily  Lidocaine, 1 patch, Transdermal, Q24H  losartan, 50 mg, Oral, Q24H  mirtazapine, 7.5 mg, Oral, Nightly  multivitamin, 1 tablet, Oral, Daily  rivaroxaban, 15 mg, Oral, Daily With Dinner  sodium chloride, 10 mL,  Intravenous, Q12H  terazosin, 1 mg, Oral, Nightly  vancomycin, 125 mg, Oral, BID      Continuous Infusions:   PRN Meds:.  acetaminophen    dextrose    dextrose    glucagon (human recombinant)    hydrALAZINE    melatonin    ondansetron **OR** ondansetron    oxyCODONE-acetaminophen    Potassium Replacement - Follow Nurse / BPA Driven Protocol    prochlorperazine **OR** prochlorperazine **OR** prochlorperazine    [COMPLETED] Insert Peripheral IV **AND** sodium chloride    sodium chloride    sodium chloride    Assessment & Plan   Assessment & Plan     Active Hospital Problems    Diagnosis  POA    **Hypertensive urgency [I16.0]  Yes    Acute UTI (urinary tract infection) [N39.0]  Yes    Nausea vomiting and diarrhea [R11.2, R19.7]  Yes    PAF (paroxysmal atrial fibrillation) [I48.0]  Yes    Anxiety associated with depression [F41.8]  Yes    Clavicle fracture [S42.009A]  Yes    CKD (chronic kidney disease) stage 3, GFR 30-59 ml/min [N18.30]  Yes    Hypokalemia [E87.6]  Yes    Gastroparesis [K31.84]  Yes    Uncontrolled type 1 diabetes mellitus with hyperglycemia [E10.65]  Yes    Hyperlipidemia [E78.5]  Yes    Hypertension [I10]  Yes      Resolved Hospital Problems   No resolved problems to display.        Brief Hospital Course to date:  Thelma Michael is a 62 y.o. female w/ a hx of poorly controlled type 1 diabetes, Afib on Xarelto, iron deficiency anemia, gastroparesis s/p gastric stimulator, HTN, HLD, migraines, chronic urinary retention (previously performed self-catheterization), strokes (chronic right lentiform nucleus and left cerebellar lacunar infarcts found on MRI in February of 2023), tobacco abuse and GERD who presented to the emergency room with complaints of nausea, vomiting and diarrhea. Patient has had issues with fevers since the evening of 9/8, repeat blood cultures have been NGTD and no obvious new symptoms. ID consulted, repeat blood cultures from 09/09 NGTD. ID recommending zosyn, transitioned to  Augmentin. Oral vancomycin for C diff prophylaxis.      Positive blood cultures 2/2  Abdominal pain, Sepsis   Fever  Right middle/right lower lobe pneumonia  - suspected contaminate, Vancomycin then stopped,-repeat BC obtained NGTD  - ID following with repeat blood cultures on 9/9, currently negative   -- patient continued to have abdominal pain, and intermittent fevers, on 09/12, repeat CT abd/pelvis is negative for acute findings of abdomen, CT chest is suggestive of right middle and lower lobe pneumonia   - Previous procal and LA negative, repeat is equally negative  --ID following she is s/pIV Zosyn t per ID, lost IV access, has been transitioned to Augmentin for now with oral vanco for C diff prophylaxis.      hypertensive urgency   History of hypertension  -She is status post Cardene gtt. BP much improved  -Continue p.o. hydralazine, Coreg, Norvasc, terazosin  -09/12 home losartan restarted as renal function improved.     Poorly controlled type 1 diabetes A1c 11.1%   -FSBG's reviewed labile, with hypo and hyperglycemia, currently stable  -Continue basal and SSI, adjust as warranted  -diabetic educator consult       Nausea, vomiting, diarrhea   Hx of gastroparesis   --CT abd/pel without acute findings other than bladder wall thickening c/w chronic cystitis   --GI PCR + Enteroaggregative E.coli -- likely colonization.  --ID consult has been requested, continue to follow.   --Continued supportive care at this time     Acute UTI   Hx of neurogenic bladder (previously self-cath'd)  -UA w/ 4+ bacteria, 31-50 WBCs   -urine culture > 100k GNB  -previous urine culture + for enterococcus faecalis in 4/23 (previous results w/ mixed sameera) 09/05 (+) Ecoli  -Antibiotics as above per ID  -bladder scan q 4      Hypokalemia-resolved  Continue to monitor and replete per protocol     CKD Stage III  -Renal function seems to be stable and within her baseline  -s/p IV fluids  -monitor closely with N/V/D and minimal oral intake       PAF   -continue coreg, Xarelto      Expected Discharge Location and Transportation: In patient rehab  Expected Discharge   Expected Discharge Date: 9/15/2023; Expected Discharge Time:      DVT prophylaxis:  Medical DVT prophylaxis orders are present.     AM-PAC 6 Clicks Score (PT): 11 (09/12/23 0920)    CODE STATUS:   Code Status and Medical Interventions:   Ordered at: 09/06/23 0529     Code Status (Patient has no pulse and is not breathing):    CPR (Attempt to Resuscitate)     Medical Interventions (Patient has pulse or is breathing):    Full Support       Laila García, HOWIE  09/14/23

## 2023-09-14 NOTE — PLAN OF CARE
Goal Outcome Evaluation:  Plan of Care Reviewed With: patient        Progress: improving  Outcome Evaluation: Patient gave good effort with mobility training but continues to be limited by weakness, decreased endurance, and balance impairments affecting independence with mobility. Continue skilled IP PT services. Recommend IRF at d/c.      Anticipated Discharge Disposition (PT): inpatient rehabilitation facility

## 2023-09-14 NOTE — THERAPY TREATMENT NOTE
Patient Name: Thelma Michael  : 1960    MRN: [id number]                              Today's Date: 2023       Admit Date: 2023    Visit Dx:     ICD-10-CM ICD-9-CM   1. Hypertensive urgency  I16.0 401.9   2. Acute kidney injury  N17.9 584.9   3. Hyperglycemia  R73.9 790.29   4. Acute superficial gastritis without hemorrhage  K29.00 535.40     Patient Active Problem List   Diagnosis    Diabetic peripheral neuropathy    Hyperlipidemia    Hypertension    Osteoporosis    Tobacco use    Heart valve disease    Iron deficiency anemia secondary to inadequate dietary iron intake    Acute kidney injury    DKA (diabetic ketoacidoses)    Hypertensive urgency    Uncontrolled type 1 diabetes mellitus with hyperglycemia    Gastroparesis    PFO (patent foramen ovale)    Atrial fibrillation and flutter    Intractable vomiting    Hypokalemia    Refractory nausea and vomiting    HHS vs mild DKA    CKD (chronic kidney disease) stage 3, GFR 30-59 ml/min    Hypertensive urgency    Urinary retention    Family history of transient ischemic attacks    Type 1 diabetes mellitus with hypoglycemia with coma    Acute UTI (urinary tract infection)    Nausea vomiting and diarrhea    PAF (paroxysmal atrial fibrillation)    Anxiety associated with depression    Clavicle fracture     Past Medical History:   Diagnosis Date    Acid reflux     Acute bronchitis     Cardiac murmur     Depression with anxiety 10/5/2020    Diabetes mellitus     Gastroesophageal reflux disease 2016    H/O echocardiogram 2012    i. LVEF 65%.ii. Mild LVH.iii. Borderline evidence of atrial septal aneurysm.  No PFO.     History of nuclear stress test 2014    Negative for ischemia and scars; LVEF 77%.      History of TIAs 7/15/2021    Hyperlipidemia     Hypertension     Impacted cerumen of both ears     Migraine     Migraines 10/31/2019    Self-catheterizes urinary bladder     Sinusitis     Stroke     Tobacco abuse     quit 4 days ago.       Urticaria     Vitamin D deficiency 5/13/2016     Past Surgical History:   Procedure Laterality Date    CAPSULE ENDOSCOPY  07/27/2021    Procedure: PILLCAM DEPLOYMENT;  Surgeon: Mikael Worthy MD;  Location:  [facility];  Service: Gastroenterology;;    COLONOSCOPY      COLONOSCOPY N/A 07/27/2021    Procedure: COLONOSCOPY;  Surgeon: Mikael Worthy MD;  Location:  [facility];  Service: Gastroenterology;  Laterality: N/A;    DENTAL PROCEDURE      ENDOSCOPY N/A 06/30/2021    Procedure: ESOPHAGOGASTRODUODENOSCOPY;  Surgeon: Brunner, Mark I, MD;  Location:  [facility];  Service: Gastroenterology;  Laterality: N/A;    ENDOSCOPY N/A 07/27/2021    Procedure: ESOPHAGOGASTRODUODENOSCOPY;  Surgeon: Mikael Worthy MD;  Location:  [facility];  Service: Gastroenterology;  Laterality: N/A;    ENDOSCOPY WITH JTUBE N/A 03/16/2022    Procedure: ESOPHAGOGASTRODUODENOSCOPY WITH JEJUNAL TUBE INSERTION;  Surgeon: Thom Glover MD;  Location:  [facility];  Service: Gastroenterology;  Laterality: N/A;    UPPER GASTROINTESTINAL ENDOSCOPY        General Information       Row Name 09/14/23 1320          Physical Therapy Time and Intention    Document Type therapy note (daily note)  -NS     Mode of Treatment physical therapy  -NS       Row Name 09/14/23 1320          General Information    Patient Profile Reviewed yes  -NS     Existing Precautions/Restrictions fall;other (see comments)  L clavicle dx, LUE sling in room for OOB activity, NWB LUE.  -NS       Row Name 09/14/23 1320          Cognition    Orientation Status (Cognition) oriented x 3  -NS       Row Name 09/14/23 1320          Safety Issues, Functional Mobility    Safety Issues Affecting Function (Mobility) safety precaution awareness;safety precautions follow-through/compliance;sequencing abilities;judgment;problem-solving  -NS     Impairments Affecting Function (Mobility) balance;coordination;endurance/activity tolerance;strength;range of  motion (ROM);pain;postural/trunk control;motor planning  -NS               User Key  (r) = Recorded By, (t) = Taken By, (c) = Cosigned By      Initials Name Provider Type    Milean Manzo PT Physical Therapist                   Mobility       Row Name 09/14/23 1320          Bed Mobility    Bed Mobility supine-sit  -NS     Supine-Sit Harmony (Bed Mobility) moderate assist (50% patient effort);2 person assist  -NS     Assistive Device (Bed Mobility) bed rails;draw sheet;head of bed elevated  -NS     Comment, (Bed Mobility) Pt able to advance LEs and hips towards EOB but needs more assistance for trunk due to limited use of LUE  -NS       Row Name 09/14/23 1320          Transfers    Comment, (Transfers) Cues for foot placement. Pt demonstrated flexed posture initially in standing for first rep due to back pain, able to achieve full upright posture on 2nd stand.  -NS       Row Name 09/14/23 1320          Sit-Stand Transfer    Sit-Stand Harmony (Transfers) minimum assist (75% patient effort);2 person assist  -NS     Assistive Device (Sit-Stand Transfers) other (see comments)  RUE support  -NS       Row Name 09/14/23 1320          Gait/Stairs (Locomotion)    Harmony Level (Gait) moderate assist (50% patient effort);2 person assist  -NS     Assistive Device (Gait) other (see comments)  RUE support  -NS     Distance in Feet (Gait) 3  -NS     Deviations/Abnormal Patterns (Gait) miguel decreased;festinating/shuffling;gait speed decreased;stride length decreased;base of support, narrow  -NS     Bilateral Gait Deviations heel strike decreased  -NS     Comment, (Gait/Stairs) Patient ambulated with shuffled steps, demonstrating posterior lean and some difficulty advancing each LE.  -NS               User Key  (r) = Recorded By, (t) = Taken By, (c) = Cosigned By      Initials Name Provider Type    Milena Manzo PT Physical Therapist                   Obj/Interventions       Row Name 09/14/23 1320           Motor Skills    Therapeutic Exercise hip;knee;ankle  -NS       Row Name 09/14/23 1320          Hip (Therapeutic Exercise)    Hip (Therapeutic Exercise) strengthening exercise  -NS     Hip Strengthening (Therapeutic Exercise) bilateral;external rotation;internal rotation;marching while seated;10 repetitions  -NS       Row Name 09/14/23 1320          Knee (Therapeutic Exercise)    Knee (Therapeutic Exercise) strengthening exercise  -NS     Knee Strengthening (Therapeutic Exercise) bilateral;heel slides;LAQ (long arc quad);10 repetitions  -NS       [facility] Name 09/14/23 1320          Ankle (Therapeutic Exercise)    Ankle (Therapeutic Exercise) AROM (active range of motion)  -NS     Ankle AROM (Therapeutic Exercise) bilateral;dorsiflexion;plantarflexion;10 repetitions  -NS       [facility] Name 09/14/23 1320          Balance    Balance Assessment sitting static balance;sitting dynamic balance;standing static balance;standing dynamic balance  -NS     Static Sitting Balance contact guard  -NS     Dynamic Sitting Balance contact guard  -NS     Position, Sitting Balance unsupported;sitting edge of bed  -NS     Static Standing Balance minimal assist;2-person assist  -NS     Dynamic Standing Balance moderate assist;2-person assist  -NS     Position/Device Used, Standing Balance supported  -NS               User Key  (r) = Recorded By, (t) = Taken By, (c) = Cosigned By      Initials Name Provider Type    Milena Manzo PT Physical Therapist                   Goals/Plan    No documentation.                  Clinical Impression       Row Name 09/14/23 1320          Pain    Pre/Posttreatment Pain Comment sling donned on arrival  -NS     Pain Intervention(s) Repositioned  -NS       Row Name 09/14/23 1320          Pain Scale: FACES Pre/Post-Treatment    Pain: FACES Scale, Pretreatment 4-->hurts little more  -NS     Posttreatment Pain Rating 4-->hurts little more  -NS     Pain Location generalized  -NS     Pain Location - shoulder  -NS        Row Name 09/14/23 1320          Plan of Care Review    Plan of Care Reviewed With patient  -NS     Progress improving  -NS     Outcome Evaluation Patient gave good effort with mobility training but continues to be limited by weakness, decreased endurance, and balance impairments affecting independence with mobility. Continue skilled IP PT services. Recommend IRF at d/c.  -NS       Row Name 09/14/23 1320          Vital Signs    Pretreatment Heart Rate (beats/min) 72  -NS     Posttreatment Heart Rate (beats/min) 69  -NS     Pre SpO2 (%) 99  -NS     O2 Delivery Pre Treatment room air  -NS     Post SpO2 (%) 96  -NS     O2 Delivery Post Treatment room air  -NS     Pre Patient Position Supine  -NS     Intra Patient Position Standing  -NS     Post Patient Position Sitting  -NS       Row Name 09/14/23 1320          Positioning and Restraints    Pre-Treatment Position in bed  -NS     Post Treatment Position chair  -NS     In Chair notified nsg;reclined;call light within reach;encouraged to call for assist;exit alarm on;on mechanical lift sling;waffle cushion;heels elevated;legs elevated;LUE elevated  -NS               User Key  (r) = Recorded By, (t) = Taken By, (c) = Cosigned By      Initials Name Provider Type    Milena Manzo, PT Physical Therapist                   Outcome Measures       Row Name 09/14/23 1320          How much help from another person do you currently need...    Turning from your back to your side while in flat bed without using bedrails? 3  -NS     Moving from lying on back to sitting on the side of a flat bed without bedrails? 2  -NS     Moving to and from a bed to a chair (including a wheelchair)? 2  -NS     Standing up from a chair using your arms (e.g., wheelchair, bedside chair)? 2  -NS     Climbing 3-5 steps with a railing? 1  -NS     To walk in hospital room? 2  -NS     AM-PAC 6 Clicks Score (PT) 12  -NS     Highest level of mobility 4 --> Transferred to chair/commode  -NS       Row Name  09/14/23 1320          Functional Assessment    Outcome Measure Options AM-PAC 6 Clicks Basic Mobility (PT)  -NS               User Key  (r) = Recorded By, (t) = Taken By, (c) = Cosigned By      Initials Name Provider Type    Milena Manzo PT Physical Therapist                                 Physical Therapy Education       Title: PT OT SLP Therapies (In Progress)       Topic: Physical Therapy (Done)       Point: Mobility training (Done)       Learning Progress Summary             Patient Acceptance, E, VU,NR by NS at 9/14/2023 1449                         Point: Home exercise program (Done)       Learning Progress Summary             Patient Acceptance, E, VU,NR by NS at 9/14/2023 1449                         Point: Body mechanics (Done)       Learning Progress Summary             Patient Acceptance, E, VU,NR by NS at 9/14/2023 1449                         Point: Precautions (Done)       Learning Progress Summary             Patient Acceptance, E, VU,NR by NS at 9/14/2023 1449                                         User Key       Initials Effective Dates Name Provider Type Discipline    NS 06/16/21 -  Milena Waite PT Physical Therapist PT                  PT Recommendation and Plan     Plan of Care Reviewed With: patient  Progress: improving  Outcome Evaluation: Patient gave good effort with mobility training but continues to be limited by weakness, decreased endurance, and balance impairments affecting independence with mobility. Continue skilled IP PT services. Recommend IRF at d/c.     Time Calculation:         PT Charges       Row Name 09/14/23 1320             Time Calculation    Start Time 1320  -NS      PT Received On 09/14/23  -NS      PT Goal Re-Cert Due Date 09/22/23  -NS         Timed Charges    73606 - PT Therapeutic Exercise Minutes 12  -NS      02662 - PT Therapeutic Activity Minutes 18  -NS         Total Minutes    Timed Charges Total Minutes 30  -NS       Total Minutes 30  -NS                 User Key  (r) = Recorded By, (t) = Taken By, (c) = Cosigned By      Initials Name Provider Type    Milena Manzo PT Physical Therapist                  Therapy Charges for Today       Code Description Service Date Service Provider Modifiers Qty    03072291261  PT THERAPEUTIC ACT EA 15 MIN 9/14/2023 Milena Waite, PT GP 1    98836733698 HC PT THER PROC EA 15 MIN 9/14/2023 Milena Waite, PT GP 1    71909181239 HC PT THER SUPP EA 15 MIN 9/14/2023 Milena Waite, PT GP 2            PT G-Codes  Outcome Measure Options: AM-PAC 6 Clicks Basic Mobility (PT)  AM-PAC 6 Clicks Score (PT): 12  AM-PAC 6 Clicks Score (OT): 13  PT Discharge Summary  Anticipated Discharge Disposition (PT): inpatient rehabilitation facility    Milena Waite PT  9/14/2023

## 2023-09-15 LAB
GLUCOSE BLDC GLUCOMTR-MCNC: 196 MG/DL (ref 70–130)
GLUCOSE BLDC GLUCOMTR-MCNC: 241 MG/DL (ref 70–130)
GLUCOSE BLDC GLUCOMTR-MCNC: 264 MG/DL (ref 70–130)
GLUCOSE BLDC GLUCOMTR-MCNC: 266 MG/DL (ref 70–130)

## 2023-09-15 PROCEDURE — 63710000001 INSULIN LISPRO (HUMAN) PER 5 UNITS: Performed by: NURSE PRACTITIONER

## 2023-09-15 PROCEDURE — 63710000001 INSULIN DETEMIR PER 5 UNITS: Performed by: INTERNAL MEDICINE

## 2023-09-15 PROCEDURE — 99231 SBSQ HOSP IP/OBS SF/LOW 25: CPT | Performed by: NURSE PRACTITIONER

## 2023-09-15 PROCEDURE — 82948 REAGENT STRIP/BLOOD GLUCOSE: CPT

## 2023-09-15 RX ORDER — AMOXICILLIN AND CLAVULANATE POTASSIUM 875; 125 MG/1; MG/1
1 TABLET, FILM COATED ORAL EVERY 12 HOURS SCHEDULED
Status: CANCELLED | OUTPATIENT
Start: 2023-09-15 | End: 2023-09-16

## 2023-09-15 RX ADMIN — TERAZOSIN HYDROCHLORIDE 1 MG: 1 CAPSULE ORAL at 21:31

## 2023-09-15 RX ADMIN — INSULIN LISPRO 4 UNITS: 100 INJECTION, SOLUTION INTRAVENOUS; SUBCUTANEOUS at 21:32

## 2023-09-15 RX ADMIN — OXYCODONE HYDROCHLORIDE AND ACETAMINOPHEN 1 TABLET: 5; 325 TABLET ORAL at 21:31

## 2023-09-15 RX ADMIN — VANCOMYCIN HYDROCHLORIDE 125 MG: 125 CAPSULE ORAL at 21:31

## 2023-09-15 RX ADMIN — INSULIN LISPRO 3 UNITS: 100 INJECTION, SOLUTION INTRAVENOUS; SUBCUTANEOUS at 16:21

## 2023-09-15 RX ADMIN — INSULIN DETEMIR 5 UNITS: 100 INJECTION, SOLUTION SUBCUTANEOUS at 09:24

## 2023-09-15 RX ADMIN — MIRTAZAPINE 7.5 MG: 15 TABLET, FILM COATED ORAL at 21:31

## 2023-09-15 RX ADMIN — CARVEDILOL 12.5 MG: 12.5 TABLET, FILM COATED ORAL at 16:21

## 2023-09-15 RX ADMIN — AMLODIPINE BESYLATE 10 MG: 10 TABLET ORAL at 09:23

## 2023-09-15 RX ADMIN — HYDRALAZINE HYDROCHLORIDE 50 MG: 50 TABLET, FILM COATED ORAL at 16:20

## 2023-09-15 RX ADMIN — Medication 1 CAPSULE: at 09:23

## 2023-09-15 RX ADMIN — Medication 5 MG: at 21:31

## 2023-09-15 RX ADMIN — FAMOTIDINE 20 MG: 20 TABLET ORAL at 09:23

## 2023-09-15 RX ADMIN — FERROUS SULFATE TAB 325 MG (65 MG ELEMENTAL FE) 325 MG: 325 (65 FE) TAB at 09:24

## 2023-09-15 RX ADMIN — HYDRALAZINE HYDROCHLORIDE 50 MG: 50 TABLET, FILM COATED ORAL at 05:34

## 2023-09-15 RX ADMIN — VANCOMYCIN HYDROCHLORIDE 125 MG: 125 CAPSULE ORAL at 09:24

## 2023-09-15 RX ADMIN — HYDRALAZINE HYDROCHLORIDE 50 MG: 50 TABLET, FILM COATED ORAL at 21:31

## 2023-09-15 RX ADMIN — Medication 10 ML: at 21:32

## 2023-09-15 RX ADMIN — INSULIN LISPRO 2 UNITS: 100 INJECTION, SOLUTION INTRAVENOUS; SUBCUTANEOUS at 09:26

## 2023-09-15 RX ADMIN — OXYCODONE HYDROCHLORIDE AND ACETAMINOPHEN 1 TABLET: 5; 325 TABLET ORAL at 05:34

## 2023-09-15 RX ADMIN — MULTIVITAMIN TABLET 1 TABLET: TABLET at 09:24

## 2023-09-15 RX ADMIN — LOSARTAN POTASSIUM 50 MG: 50 TABLET, FILM COATED ORAL at 09:25

## 2023-09-15 RX ADMIN — RIVAROXABAN 15 MG: 15 TABLET, FILM COATED ORAL at 16:21

## 2023-09-15 RX ADMIN — Medication 10 ML: at 09:25

## 2023-09-15 RX ADMIN — AMOXICILLIN AND CLAVULANATE POTASSIUM 1 TABLET: 875; 125 TABLET, FILM COATED ORAL at 09:24

## 2023-09-15 RX ADMIN — AMOXICILLIN AND CLAVULANATE POTASSIUM 1 TABLET: 875; 125 TABLET, FILM COATED ORAL at 21:31

## 2023-09-15 RX ADMIN — CARVEDILOL 12.5 MG: 12.5 TABLET, FILM COATED ORAL at 09:23

## 2023-09-15 RX ADMIN — INSULIN LISPRO 3 UNITS: 100 INJECTION, SOLUTION INTRAVENOUS; SUBCUTANEOUS at 12:21

## 2023-09-15 NOTE — PROGRESS NOTES
Clinical Nutrition   Nutrition Support Assessment  Reason for Visit: LOS      Patient Name: Thelma Michael  YOB: 1960  MRN: [id number]  Date of Encounter: 09/15/23 08:04 EDT  Admission date: 9/5/2023    Comments:    Ordered Boost GC BID; encourage po intake.    Nutrition Assessment   Admission Diagnosis:  Hypertensive urgency [I16.0]      Problem List:    Hypertensive urgency    Hyperlipidemia    Hypertension    Uncontrolled type 1 diabetes mellitus with hyperglycemia    Gastroparesis    Hypokalemia    CKD (chronic kidney disease) stage 3, GFR 30-59 ml/min    Acute UTI (urinary tract infection)    Nausea vomiting and diarrhea    PAF (paroxysmal atrial fibrillation)    Anxiety associated with depression    Clavicle fracture        PMH:   She  has a past medical history of Acid reflux, Acute bronchitis, Cardiac murmur, Depression with anxiety (10/5/2020), Diabetes mellitus, Gastroesophageal reflux disease (5/13/2016), H/O echocardiogram (08/07/2012), History of nuclear stress test (08/22/2014), History of TIAs (7/15/2021), Hyperlipidemia, Hypertension, Impacted cerumen of both ears, Migraine, Migraines (10/31/2019), Self-catheterizes urinary bladder, Sinusitis, Stroke, Tobacco abuse, Urticaria, and Vitamin D deficiency (5/13/2016).    PSH:  She  has a past surgical history that includes Dental surgery; Colonoscopy; Esophagogastroduodenoscopy (N/A, 06/30/2021); Colonoscopy (N/A, 07/27/2021); Esophagogastroduodenoscopy (N/A, 07/27/2021); Capsule Endoscopy (07/27/2021); endoscopy with jtube (N/A, 03/16/2022); and Upper gastrointestinal endoscopy.    Applicable Nutrition Concerns:   Skin:  Oral:  GI:    Applicable Interval History:         Reported/Observed/Food/Nutrition Related History:     RD spoke w/pt, sleepy, eyes remained closed for duration of visit. Pt reports eating ~baseline, noted intakes 26% x 10 meals. Pt reports VST=843dns. Pt agreeable to Boost GC, will send. Pt  "falling asleep during RD visit, agreeable to NFPE though difficult to obtain detailed nutrition hx. NKFA per EPIC.     Anthropometrics     Flowsheet Rows      Flowsheet Row First Filed Value   Admission Height 172.7 cm (68\") Documented at 09/05/2023 2140   Admission Weight 59.4 kg (131 lb) Documented at 09/05/2023 2140          Height: Height: 172.7 cm (68\")  Last Filed Weight: Weight: 61.4 kg (135 lb 4.8 oz) (09/15/23 0532)  Method: Weight Method: Bed scale  BMI: BMI (Calculated): 20.6  BMI classification: Normal: 18.5-24.9kg/m2    UBW:  130-135lbs per EMR  Weight change:      Weight       Weight (kg) Weight (lbs) Weight Method Visit Report   3/30/2023 63.504 kg  140 lb  Stated     3/31/2023 59.5 kg  131 lb 2.8 oz      4/30/2023 61.236 kg  135 lb  Stated     5/5/2023 61.236 kg  135 lb      6/10/2023 61.236 kg  135 lb  Stated     6/26/2023 62.143 kg  137 lb   --    7/3/2023 63.957 kg  141 lb   --    7/25/2023 60.413 kg  133 lb 3 oz   --    8/10/2023 63.05 kg  139 lb   --    9/1/2023 63.05 kg  139 lb  Stated     9/5/2023 59.421 kg  131 lb      9/6/2023 59.739 kg  131 lb 11.2 oz  Bed scale     9/7/2023 58.832 kg  129 lb 11.2 oz  Bed scale     9/8/2023 60.601 kg  133 lb 9.6 oz  Standing scale     9/9/2023 62.324 kg  137 lb 6.4 oz  Bed scale     9/10/2023 59.739 kg  131 lb 11.2 oz  Bed scale     9/11/2023 59.33 kg  130 lb 12.8 oz  Bed scale     9/12/2023 63.685 kg  140 lb 6.4 oz  Bed scale     9/13/2023 63.322 kg  139 lb 9.6 oz  Bed scale     9/14/2023 63.957 kg  141 lb      9/15/2023 61.372 kg  135 lb 4.8 oz  Bed scale         Nutrition Focused Physical Exam     Date:     9/15    NFPE completed, patient does not meet criteria for MSA at this time.     Current Nutrition Prescription   PO: Diet: Cardiac Diets, Diabetic Diets, Gastrointestinal Diets; Healthy Heart (2-3 Na+); Consistent Carbohydrate; Low Irritant; Texture: Soft to Chew (NDD 3); Soft to Chew: Whole Meat; Fluid Consistency: Thin (IDDSI 0)  Oral Nutrition " Supplement:   Intake: 26% x 10 meals      Nutrition Diagnosis   Date:  9/15            Updated:    Problem Predicted suboptimal energy intake   Etiology Clinical status   Signs/Symptoms Po intake 26% x 10 meals   Status:     Goal:   General: Nutrition to support treatment  PO: Increase intake  EN/PN: N/A    Nutrition Intervention      Follow treatment progress, Care plan reviewed, Encourage intake, Supplement provided    Boost GC (omar) BID    Monitoring/Evaluation:   Per protocol, PO intake, Supplement intake      Cait Carrasquillo RD  Time Spent: 25

## 2023-09-15 NOTE — PAYOR COMM NOTE
"Alisa Michael (62 y.o. Female)     [id number]     Cassandra Magdaleno, SUKH  Utilization Review  Iinaq-[phone]  [phone]        Date of Birth   1960    Social Security Number       Address   [address] [place], [place] [address]    Home Phone   [phone]    MRN   [id number]       Adventism   Restoration    Marital Status                               Admission Date   9/5/23    Admission Type   Emergency    Admitting Provider   Delia Triplett DO    Attending Provider   Delia Triplett DO    Department, Room/Bed   [facility] 6A, N606/1       Discharge Date       Discharge Disposition       Discharge Destination                                 Attending Provider: Delia Triplett DO    Allergies: No Known Allergies    Isolation: Spore   Infection: C.difficile (08/11/23)   Code Status: CPR    Ht: 172.7 cm (68\")   Wt: 61.4 kg (135 lb 4.8 oz)    Admission Cmt: None   Principal Problem: Hypertensive urgency [I16.0]                   Active Insurance as of 9/5/2023       Primary Coverage       Payor Plan Insurance Group Employer/Plan Group    [facility] MEDICAID [facility]       Payor Plan Address Payor Plan Phone Number Payor Plan Fax Number Effective Dates    [address] [address] [phone]  4/2/2020 - None Entered    [facility] [id number]         Subscriber Name Subscriber Birth Date Member ID       ALISA MICHAEL 1960 [id number]                     Emergency Contacts        (Rel.) Home Phone Work Phone Mobile Phone    VINAYAK PENN (Son) [phone] -- [phone]    EVELINE CANTRELL (Daughter) [phone] -- [phone]    AMANDA MICHAEL (Daughter) [phone] -- [phone]    SLOAN CANTRELL (Friend) [phone] -- [phone]              Vital Signs (last day)       Date/Time Temp Temp src Pulse Resp BP Patient Position SpO2    09/15/23 0715 98.6 (37) Oral 73 16 113/61 Lying 96    09/15/23 0532 99 (37.2) Oral 76 14 122/50 " Lying 96    09/15/23 0035 98.6 (37) Oral 74 16 137/63 Lying 95    09/14/23 2008 98.2 (36.8) Oral 73 16 152/65 Lying 98    09/14/23 1652 -- -- 76 16 152/69 Sitting --    09/14/23 1145 -- Oral 77 16 168/78 Lying --    09/14/23 0804 97.8 (36.6) Axillary 73 16 131/61 Lying --    09/14/23 0603 -- -- -- -- 139/61 -- --    09/14/23 0340 98.5 (36.9) Oral 72 18 125/59 Lying 96    09/14/23 0018 98.9 (37.2) Axillary 77 18 146/57 Lying 96          Oxygen Therapy (last day)       Date/Time SpO2 Device (Oxygen Therapy) Flow (L/min) Oxygen Concentration (%) ETCO2 (mmHg)    09/15/23 0715 96 room air -- -- --    09/15/23 0631 -- room air -- -- --    09/15/23 0532 96 room air -- -- --    09/15/23 0450 -- room air -- -- --    09/15/23 0250 -- room air -- -- --    09/15/23 0050 -- room air -- -- --    09/15/23 0035 95 -- -- -- --    09/14/23 2250 -- room air -- -- --    09/14/23 2050 -- room air -- -- --    09/14/23 2008 98 -- -- -- --    09/14/23 0340 96 -- -- -- --    09/14/23 0018 96 -- -- -- --          Lines, Drains & Airways       Active LDAs       Name Placement date Placement time Site Days    External Urinary Catheter 09/09/23  1336  --  5    Insulin Pump 07/03/22  0800  -- 439                  Current Facility-Administered Medications   Medication Dose Route Frequency Provider Last Rate Last Admin    acetaminophen (TYLENOL) tablet 650 mg  650 mg Oral Q6H PRN HARI Jimenez,    650 mg at 09/11/23 0920    amLODIPine (NORVASC) tablet 10 mg  10 mg Oral Q24H Magali Delvalle APRN   10 mg at 09/15/23 0923    amoxicillin-clavulanate (AUGMENTIN) 875-125 MG per tablet 1 tablet  1 tablet Oral Q12H Pedro Tucker MD   1 tablet at 09/15/23 0924    carvedilol (COREG) tablet 12.5 mg  12.5 mg Oral BID With Meals Susan Cohen APRN   12.5 mg at 09/15/23 0923    dextrose (D50W) (25 g/50 mL) IV injection 25 g  25 g Intravenous Q15 Min PRN Magali Delvalle APRN        dextrose (GLUTOSE) oral gel 15 g  15 g Oral Q15 Min PRN Mook  HOWIE De Los Santos        famotidine (PEPCID) tablet 20 mg  20 mg Oral Ita Ramirez, PharmD   20 mg at 09/15/23 0923    ferrous sulfate tablet 325 mg  325 mg Oral Daily With Breakfast Magali Delvalle APRN   325 mg at 09/15/23 0924    glucagon (GLUCAGEN) injection 1 mg  1 mg Intramuscular Q15 Min PRN Magali Delvalle APRN        hydrALAZINE (APRESOLINE) injection 10 mg  10 mg Intravenous Q6H PRN HARI Jimenez, DO   10 mg at 09/08/23 1023    hydrALAZINE (APRESOLINE) tablet 50 mg  50 mg Oral Q8H HARI Jimenez, DO   50 mg at 09/15/23 0534    insulin detemir (LEVEMIR) injection 5 Units  5 Units Subcutaneous Daily Chris Medrano MD   5 Units at 09/15/23 0924    Insulin Lispro (humaLOG) injection 2-7 Units  2-7 Units Subcutaneous 4x Daily AC & at Bedtime Magali Delvalle APRN   2 Units at 09/15/23 0926    lactobacillus acidophilus (RISAQUAD) capsule 1 capsule  1 capsule Oral Daily Magali Delvalle APRN   1 capsule at 09/15/23 0923    Lidocaine 4 % 1 patch  1 patch Transdermal Q24H Faith Nelson MD   1 patch at 09/13/23 0938    losartan (COZAAR) tablet 50 mg  50 mg Oral Q24H Chris Medrano MD   50 mg at 09/15/23 0925    melatonin tablet 5 mg  5 mg Oral Nightly PRN Magali Delvalle APRN   5 mg at 09/14/23 2134    mirtazapine (REMERON) tablet 7.5 mg  7.5 mg Oral Nightly Martin Martinez III, DO   7.5 mg at 09/14/23 2134    multivitamin (THERAGRAN) tablet 1 tablet  1 tablet Oral Daily Magali Delvalle APRN   1 tablet at 09/15/23 0924    ondansetron (ZOFRAN) tablet 4 mg  4 mg Oral Q6H PRN Magali Delvalle APRN   4 mg at 09/06/23 2105    Or    ondansetron (ZOFRAN) injection 4 mg  4 mg Intravenous Q6H PRN Magali Delvalle APRN   4 mg at 09/07/23 222    oxyCODONE-acetaminophen (PERCOCET) 5-325 MG per tablet 1 tablet  1 tablet Oral Q6H PRN Chris Medrano MD   1 tablet at 09/15/23 9348    Potassium Replacement - Follow Nurse / BPA Driven Protocol   Does not apply PRN Susan Cohen APRN        prochlorperazine  (COMPAZINE) injection 5 mg  5 mg Intravenous Q6H PRN HARI Jimenez, DO   5 mg at 09/07/23 1120    Or    prochlorperazine (COMPAZINE) tablet 5 mg  5 mg Oral Q6H PRN HARI Jimenez, DO   5 mg at 09/07/23 2057    Or    prochlorperazine (COMPAZINE) suppository 25 mg  25 mg Rectal Q12H PRN HARI Jimenez, DO        rivaroxaban (XARELTO) tablet 15 mg  15 mg Oral Daily With Dinner Magali Delvalle APRN   15 mg at 09/14/23 1654    sodium chloride 0.9 % flush 10 mL  10 mL Intravenous PRN Nas Patel MD        sodium chloride 0.9 % flush 10 mL  10 mL Intravenous Q12H Magali Delvalle APRN   10 mL at 09/15/23 0925    sodium chloride 0.9 % flush 10 mL  10 mL Intravenous PRN Magali Delvalle APRN        sodium chloride 0.9 % infusion 40 mL  40 mL Intravenous PRN Magali Delvalle APRKIA        terazosin (HYTRIN) capsule 1 mg  1 mg Oral Nightly Magali Delvalle APRN   1 mg at 09/14/23 2134    vancomycin (VANCOCIN) capsule 125 mg  125 mg Oral BID Rene García PA   125 mg at 09/15/23 0924     Lab Results (last 24 hours)       Procedure Component Value Units Date/Time    POC Glucose Once [[id number]]  (Abnormal) Collected: 09/15/23 0717    Specimen: Blood Updated: 09/15/23 0720     Glucose 196 mg/dL     POC Glucose Once [[id number]]  (Abnormal) Collected: 09/14/23 2010    Specimen: Blood Updated: 09/14/23 2021     Glucose 187 mg/dL     Blood Culture - Blood, Hand, Right [[id number]]  (Normal) Collected: 09/09/23 1751    Specimen: Blood from Hand, Right Updated: 09/14/23 1845     Blood Culture No growth at 5 days    Blood Culture - Blood, Hand, Left [[id number]]  (Normal) Collected: 09/09/23 1747    Specimen: Blood from Hand, Left Updated: 09/14/23 1845     Blood Culture No growth at 5 days    POC Glucose Once [[id number]]  (Abnormal) Collected: 09/14/23 1649    Specimen: Blood Updated: 09/14/23 1651     Glucose 225 mg/dL     POC Glucose Once [[id number]]  (Abnormal) Collected: 09/14/23 1142    Specimen: Blood Updated: 09/14/23 1143      Glucose 150 mg/dL           Imaging Results (Last 24 Hours)       ** No results found for the last 24 hours. **             Laila García APRN   Nurse Practitioner  Hospitalist     Progress Notes     Addendum     Date of Service: 23 1016  Creation Time: 23 1016     Expand All Collapse All         [facility]  PROGRESS NOTE     Patient Name: Thelma Michael  : 1960  MRN: [id number]     Date of Admission: 2023  Primary Care Physician: Monet Howe APRN        Subjective      Subjective      CC: Follow-up pneumonia, fever        HPI:   Pt resting in bed, denies abd pain, nausea. She had a episode of diarrhea this morning.      Copied text in this note has been reviewed and is accurate as of 23.               Objective      Objective      Vital Signs:   Temp:  [97.8 °F (36.6 °C)-99.1 °F (37.3 °C)] 97.8 °F (36.6 °C)  Heart Rate:  [72-82] 73  Resp:  [16-18] 16  BP: (117-183)/(57-81) 131/61     Physical Exam:  Constitutional: Awake, chronically ill appearing, resting in bed  HENT: NCAT, mucous membranes moist  Respiratory: Clear to auscultation bilaterally, nonlabored 96% RA  Cardiovascular: RRR, no murmurs, rubs, or gallop HR 77  Gastrointestinal: Positive bowel sounds, soft, nontender, nondistended  Musculoskeletal: No bilateral ankle edema  Psychiatric: Flat affect, cooperative  Neurologic: Oriented x 3, non focal, MARCUM, speech clear  Skin: No rashes     Results Reviewed:  LAB RESULTS:                 Lab 23  0300 23  0305 23  0349 23  1602 23  0412 09/10/23  0343 23  1747 23  1626   WBC 8.35 8.80 10.14  --  13.98* 6.77  --  6.47   HEMOGLOBIN 9.9* 9.9* 9.8*  --  11.6* 10.4*  --  11.6*   HEMATOCRIT 30.4* 30.0* 30.0*  --  35.1 32.4*  --  36.3   PLATELETS 530* 470* 426  --  417 306  --  337   NEUTROS ABS 5.32 6.22 7.43*  --  11.51* 3.68  --  3.88   IMMATURE GRANS (ABS) 0.15* 0.12* 0.08*   --  0.06* 0.04  --  0.02   LYMPHS ABS 1.96 1.61 1.53  --  1.40 2.27  --  1.89   MONOS ABS 0.78 0.69 0.91*  --  0.96* 0.73  --  0.67   EOS ABS 0.12 0.12 0.17  --  0.02 0.02  --  0.00   MCV 89.4 89.6 89.8  --  88.6 90.8  --  90.1   PROCALCITONIN  --   --   --  0.15  --   --   --  0.15   LACTATE  --   --   --  1.5  --   --  1.0 0.7                       Lab 09/14/23  0300 09/13/23  0305 09/12/23  0349 09/11/23  0412 09/10/23  1545 09/10/23  0343 09/08/23  1626   SODIUM 141 143 141 143  --  141 145   POTASSIUM 4.0 4.1 3.8 4.1 5.2 3.4* 3.9   CHLORIDE 106 110* 109* 112*  --  111* 110*   CO2 24.0 18.0* 23.0 22.0  --  22.0 20.0*   ANION GAP 11.0 15.0 9.0 9.0  --  8.0 15.0   BUN 20 15 14 15  --  25* 17   CREATININE 1.18* 1.11* 1.21* 1.19*  --  1.50* 1.30*   EGFR 51.7* 55.6* 50.2* 51.2*  --  38.8* 46.0*   GLUCOSE 160* 142* 58* 93  --  76 127*   CALCIUM 8.2* 8.5* 8.4* 8.8  --  8.2* 8.6   MAGNESIUM  --   --  2.1 1.9  --  2.1 1.8                     Lab 09/14/23  0300 09/13/23  0305 09/12/23  0349 09/11/23  0412 09/10/23  0343   TOTAL PROTEIN 6.0 6.1 5.9* 6.6 5.7*   ALBUMIN 2.9* 2.8* 2.8* 3.2* 2.7*   GLOBULIN 3.1 3.3 3.1 3.4 3.0   ALT (SGPT) <5 5 5 7 7   AST (SGOT) 12 9 9 13 11   BILIRUBIN 0.2 0.2 0.2 0.3 0.3   ALK PHOS 65 66 69 79 62                         Brief Urine Lab Results  (Last result in the past 365 days)          Color   Clarity   Blood   Leuk Est   Nitrite   Protein   CREAT   Urine HCG         09/11/23 1536 Yellow    Turbid    Large (3+)    Large (3+)    Negative    100 mg/dL (2+)                             Microbiology Results Abnormal         Procedure Component Value - Date/Time     Blood Culture - Blood, Hand, Left [[id number]]  (Normal) Collected: 09/09/23 1747     Lab Status: Preliminary result Specimen: Blood from Hand, Left Updated: 09/13/23 1845       Blood Culture No growth at 4 days     Blood Culture - Blood, Hand, Right [[id number]]  (Normal) Collected: 09/09/23 1751     Lab Status: Preliminary result  Specimen: Blood from Hand, Right Updated: 09/13/23 1845       Blood Culture No growth at 4 days     Urine Culture - Urine, Urine, Random Void [[id number]]  (Normal) Collected: 09/11/23 1536     Lab Status: Final result Specimen: Urine, Random Void Updated: 09/13/23 0747       Urine Culture No growth     Blood Culture - Blood, Hand, Left [[id number]]  (Normal) Collected: 09/07/23 0752     Lab Status: Final result Specimen: Blood from Hand, Left Updated: 09/12/23 0815       Blood Culture No growth at 5 days     Narrative:       Aerobic bottle only        Blood Culture - Blood, Hand, Right [[id number]]  (Normal) Collected: 09/07/23 0750     Lab Status: Final result Specimen: Blood from Hand, Right Updated: 09/12/23 0815       Blood Culture No growth at 5 days     Respiratory Panel PCR w/COVID-19(SARS-CoV-2) NORMA/JUAN ALBERTO/EDY/PAD/COR/MAD/ROSALINA In-House, NP Swab in UTM/VTM, 3-4 HR TAT - Swab, Nasopharynx [[id number]]  (Normal) Collected: 09/08/23 1714     Lab Status: Final result Specimen: Swab from Nasopharynx Updated: 09/08/23 1818       ADENOVIRUS, PCR Not Detected       Coronavirus 229E Not Detected       Coronavirus HKU1 Not Detected       Coronavirus NL63 Not Detected       Coronavirus OC43 Not Detected       COVID19 Not Detected       Human Metapneumovirus Not Detected       Human Rhinovirus/Enterovirus Not Detected       Influenza A PCR Not Detected       Influenza B PCR Not Detected       Parainfluenza Virus 1 Not Detected       Parainfluenza Virus 2 Not Detected       Parainfluenza Virus 3 Not Detected       Parainfluenza Virus 4 Not Detected       RSV, PCR Not Detected       Bordetella pertussis pcr Not Detected       Bordetella parapertussis PCR Not Detected       Chlamydophila pneumoniae PCR Not Detected       Mycoplasma pneumo by PCR Not Detected     Narrative:       In the setting of a positive respiratory panel with a viral infection PLUS a negative procalcitonin without other underlying concern for bacterial  infection, consider observing off antibiotics or discontinuation of antibiotics and continue supportive care. If the respiratory panel is positive for atypical bacterial infection (Bordetella pertussis, Chlamydophila pneumoniae, or Mycoplasma pneumoniae), consider antibiotic de-escalation to target atypical bacterial infection.     Respiratory Panel PCR w/COVID-19(SARS-CoV-2) NORMA/JUAN ALBERTO/EDY/PAD/COR/MAD/ROSALINA In-House, NP Swab in UTM/VTM, 3-4 HR TAT - Swab, Nasopharynx [[id number]]  (Normal) Collected: 09/06/23 0620     Lab Status: Final result Specimen: Swab from Nasopharynx Updated: 09/06/23 0744       ADENOVIRUS, PCR Not Detected       Coronavirus 229E Not Detected       Coronavirus HKU1 Not Detected       Coronavirus NL63 Not Detected       Coronavirus OC43 Not Detected       COVID19 Not Detected       Human Metapneumovirus Not Detected       Human Rhinovirus/Enterovirus Not Detected       Influenza A PCR Not Detected       Influenza B PCR Not Detected       Parainfluenza Virus 1 Not Detected       Parainfluenza Virus 2 Not Detected       Parainfluenza Virus 3 Not Detected       Parainfluenza Virus 4 Not Detected       RSV, PCR Not Detected       Bordetella pertussis pcr Not Detected       Bordetella parapertussis PCR Not Detected       Chlamydophila pneumoniae PCR Not Detected       Mycoplasma pneumo by PCR Not Detected     Narrative:       In the setting of a positive respiratory panel with a viral infection PLUS a negative procalcitonin without other underlying concern for bacterial infection, consider observing off antibiotics or discontinuation of antibiotics and continue supportive care. If the respiratory panel is positive for atypical bacterial infection (Bordetella pertussis, Chlamydophila pneumoniae, or Mycoplasma pneumoniae), consider antibiotic de-escalation to target atypical bacterial infection.                  Radiology results from the last 24 hours:   CT Chest Without Contrast Diagnostic     Result  Date: 9/12/2023  CT CHEST WO CONTRAST DIAGNOSTIC Date of Exam: 9/12/2023 10:05 AM EDT Indication: Pneumonia, complication suspected, xray done Follow up possible PNA on CT abd/pelvis. Comparison: CT abdomen of 9/11/2023. Prior CT chest is dated February 4, 2023 FINDINGS: There is worsening fairly extensive infiltrate in the right lower lobe with multifocal patchy areas of consolidation. There is infiltrate in the posterior segment of the lateral right middle lobe with patchy area of consolidation, not included on the CT abdomen of 9/11/2023 and new as compared to the prior CT chest. There is some slight scar in the left lung base. There are no significant infiltrates on the left.. Cystic lesion of the right thyroid is stable since the prior CT chest. There are  no enlarged mediastinal nodes. There are no pleural effusions. Technique: Axial CT images were obtained of the chest without contrast administration.  Reconstructed coronal and sagittal images were also obtained. Automated exposure control and iterative construction methods were used.      Impression: Impression: Right middle and right lower lobe pneumonia. Electronically Signed: Kerri Peters MD  9/12/2023 11:17 AM EDT  Workstation ID: [id number]       Results for orders placed during the hospital encounter of 12/06/22     Adult Transthoracic Echo Complete W/ Cont if Necessary Per Protocol     Interpretation Summary    Left ventricular ejection fraction appears to be greater than 70%.    Left ventricular wall thickness is consistent with moderate concentric hypertrophy.    Left ventricular diastolic function is consistent with (grade I) impaired relaxation.    Estimated right ventricular systolic pressure from tricuspid regurgitation is mildly elevated (35-45 mmHg). Calculated right ventricular systolic pressure from tricuspid regurgitation is 39 mmHg.        Current medications:  Scheduled Meds:amLODIPine, 10 mg, Oral, Q24H  amoxicillin-clavulanate, 1  tablet, Oral, Q12H  carvedilol, 12.5 mg, Oral, BID With Meals  famotidine, 20 mg, Oral, QAM  ferrous sulfate, 325 mg, Oral, Daily With Breakfast  hydrALAZINE, 50 mg, Oral, Q8H  insulin detemir, 5 Units, Subcutaneous, Daily  insulin lispro, 2-7 Units, Subcutaneous, 4x Daily AC & at Bedtime  lactobacillus acidophilus, 1 capsule, Oral, Daily  Lidocaine, 1 patch, Transdermal, Q24H  losartan, 50 mg, Oral, Q24H  mirtazapine, 7.5 mg, Oral, Nightly  multivitamin, 1 tablet, Oral, Daily  rivaroxaban, 15 mg, Oral, Daily With Dinner  sodium chloride, 10 mL, Intravenous, Q12H  terazosin, 1 mg, Oral, Nightly  vancomycin, 125 mg, Oral, BID        Continuous Infusions:   PRN Meds:.  acetaminophen    dextrose    dextrose    glucagon (human recombinant)    hydrALAZINE    melatonin    ondansetron **OR** ondansetron    oxyCODONE-acetaminophen    Potassium Replacement - Follow Nurse / BPA Driven Protocol    prochlorperazine **OR** prochlorperazine **OR** prochlorperazine    [COMPLETED] Insert Peripheral IV **AND** sodium chloride    sodium chloride    sodium chloride           Assessment & Plan     Assessment & Plan            Active Hospital Problems     Diagnosis   POA    **Hypertensive urgency [I16.0]   Yes    Acute UTI (urinary tract infection) [N39.0]   Yes    Nausea vomiting and diarrhea [R11.2, R19.7]   Yes    PAF (paroxysmal atrial fibrillation) [I48.0]   Yes    Anxiety associated with depression [F41.8]   Yes    Clavicle fracture [S42.009A]   Yes    CKD (chronic kidney disease) stage 3, GFR 30-59 ml/min [N18.30]   Yes    Hypokalemia [E87.6]   Yes    Gastroparesis [K31.84]   Yes    Uncontrolled type 1 diabetes mellitus with hyperglycemia [E10.65]   Yes    Hyperlipidemia [E78.5]   Yes    Hypertension [I10]   Yes       Resolved Hospital Problems   No resolved problems to display.         Brief Hospital Course to date:  Thelma Michael is a 62 y.o. female w/ a hx of poorly controlled type 1 diabetes, Afib on Xarelto, iron  deficiency anemia, gastroparesis s/p gastric stimulator, HTN, HLD, migraines, chronic urinary retention (previously performed self-catheterization), strokes (chronic right lentiform nucleus and left cerebellar lacunar infarcts found on MRI in February of 2023), tobacco abuse and GERD who presented to the emergency room with complaints of nausea, vomiting and diarrhea. Patient has had issues with fevers since the evening of 9/8, repeat blood cultures have been NGTD and no obvious new symptoms. ID consulted, repeat blood cultures from 09/09 NGTD. ID recommending zosyn, transitioned to Augmentin. Oral vancomycin for C diff prophylaxis.      Positive blood cultures 2/2  Abdominal pain, Sepsis   Fever  Right middle/right lower lobe pneumonia  - suspected contaminate, Vancomycin then stopped,-repeat BC obtained NGTD  - ID following with repeat blood cultures on 9/9, currently negative   -- patient continued to have abdominal pain, and intermittent fevers, on 09/12, repeat CT abd/pelvis is negative for acute findings of abdomen, CT chest is suggestive of right middle and lower lobe pneumonia   - Previous procal and LA negative, repeat is equally negative  --ID following she is s/pIV Zosyn t per ID, lost IV access, has been transitioned to Augmentin for now with oral vanco for C diff prophylaxis.      hypertensive urgency   History of hypertension  -She is status post Cardene gtt. BP much improved  -Continue p.o. hydralazine, Coreg, Norvasc, terazosin  -09/12 home losartan restarted as renal function improved.     Poorly controlled type 1 diabetes A1c 11.1%   -FSBG's reviewed labile, with hypo and hyperglycemia, currently stable  -Continue basal and SSI, adjust as warranted  -diabetic educator consult       Nausea, vomiting, diarrhea   Hx of gastroparesis   --CT abd/pel without acute findings other than bladder wall thickening c/w chronic cystitis   --GI PCR + Enteroaggregative E.coli -- likely colonization.  --ID consult  has been requested, continue to follow.   --Continued supportive care at this time     Acute UTI   Hx of neurogenic bladder (previously self-cath'd)  -UA w/ 4+ bacteria, 31-50 WBCs   -urine culture > 100k GNB  -previous urine culture + for enterococcus faecalis in 4/23 (previous results w/ mixed sameera) 09/05 (+) Ecoli  -Antibiotics as above per ID  -bladder scan q 4      Hypokalemia-resolved  Continue to monitor and replete per protocol     CKD Stage III  -Renal function seems to be stable and within her baseline  -s/p IV fluids  -monitor closely with N/V/D and minimal oral intake      PAF   -continue coreg, Xarelto      Expected Discharge Location and Transportation: In patient rehab  Expected Discharge   Expected Discharge Date: 9/15/2023; Expected Discharge Time:       DVT prophylaxis:  Medical DVT prophylaxis orders are present.      AM-PAC 6 Clicks Score (PT): 11 (09/12/23 0920)     CODE STATUS:       Code Status and Medical Interventions:   Ordered at: 09/06/23 0529     Code Status (Patient has no pulse and is not breathing):     CPR (Attempt to Resuscitate)     Medical Interventions (Patient has pulse or is breathing):     Full Support         Laila García, APRN  09/14/23                 Revision History

## 2023-09-15 NOTE — CASE MANAGEMENT/SOCIAL WORK
Continued Stay Note  [facility]     Patient Name: Thelma Michael  MRN: [id number]  Today's Date: 9/15/2023    Admit Date: 9/5/2023    Plan: discharge plan   Discharge Plan       Row Name 09/15/23 1407       Plan    Plan discharge plan    Plan Comments Per April(liasn for [facility]), the doctor at [facility] has agreed to accept, pending insurance approval. Precert has been initated and still pending I updated pt in room and will cont to follow.    Final Discharge Disposition Code 62 - inpatient rehab facility                   Discharge Codes    No documentation.                 Expected Discharge Date and Time       Expected Discharge Date Expected Discharge Time    Sep 16, 2023               Nicky Baltazar RN

## 2023-09-15 NOTE — PLAN OF CARE
Goal Outcome Evaluation:  A&Ox4, VSS, intermittent shoulder pain elevated with PRN pain medication. No significant changes overnight. Resting comfortably in bed. Alarms on and audible, bed in lowest position, assistive devices within reach. Will continue to monitor.

## 2023-09-15 NOTE — PROGRESS NOTES
[facility] Medicine Services  PROGRESS NOTE    Patient Name: Thelma Michael  : 1960  MRN: [id number]    Date of Admission: 2023  Primary Care Physician: Monet Howe APRN    Subjective   Subjective     CC: Follow-up pneumonia, fever      HPI:   Pt sitting up with bed with her daughter at the bedside. Update the daughter on her mother's condition. Her daughter would like to make sure she gets a bath daily.     Copied text in this note has been reviewed and is accurate as of 09/15/23.       Objective   Objective     Vital Signs:   Temp:  [98.2 °F (36.8 °C)-99 °F (37.2 °C)] 98.6 °F (37 °C)  Heart Rate:  [73-76] 73  Resp:  [14-16] 16  BP: (113-152)/(50-69) 113/61     Physical Exam:  Constitutional: Awake, alert, frail appearing  HENT: NCAT, mucous membranes moist    Respiratory: Clear to auscultation bilaterally, nonlabored respirations   Cardiovascular: RRR, no murmurs, rubs, or gallop  Gastrointestinal: Positive bowel sounds, soft, nontender, nondistended  Musculoskeletal: No bilateral ankle edema  Psychiatric: Appropriate affect, cooperative  Neurologic: Oriented x 3, non focal, MARCUM speech clear  Skin: No rashes      Results Reviewed:  LAB RESULTS:      Lab 23  0300 23  0305 23  0349 23  1602 23  0412 09/10/23  0343 23  1747 23  1626   WBC 8.35 8.80 10.14  --  13.98* 6.77  --  6.47   HEMOGLOBIN 9.9* 9.9* 9.8*  --  11.6* 10.4*  --  11.6*   HEMATOCRIT 30.4* 30.0* 30.0*  --  35.1 32.4*  --  36.3   PLATELETS 530* 470* 426  --  417 306  --  337   NEUTROS ABS 5.32 6.22 7.43*  --  11.51* 3.68  --  3.88   IMMATURE GRANS (ABS) 0.15* 0.12* 0.08*  --  0.06* 0.04  --  0.02   LYMPHS ABS 1.96 1.61 1.53  --  1.40 2.27  --  1.89   MONOS ABS 0.78 0.69 0.91*  --  0.96* 0.73  --  0.67   EOS ABS 0.12 0.12 0.17  --  0.02 0.02  --  0.00   MCV 89.4 89.6 89.8  --  88.6 90.8  --  90.1   PROCALCITONIN  --   --   --  0.15  --   --   --  0.15    LACTATE  --   --   --  1.5  --   --  1.0 0.7           Lab 09/14/23  0300 09/13/23  0305 09/12/23  0349 09/11/23  0412 09/10/23  1545 09/10/23  0343 09/08/23  1626   SODIUM 141 143 141 143  --  141 145   POTASSIUM 4.0 4.1 3.8 4.1 5.2 3.4* 3.9   CHLORIDE 106 110* 109* 112*  --  111* 110*   CO2 24.0 18.0* 23.0 22.0  --  22.0 20.0*   ANION GAP 11.0 15.0 9.0 9.0  --  8.0 15.0   BUN 20 15 14 15  --  25* 17   CREATININE 1.18* 1.11* 1.21* 1.19*  --  1.50* 1.30*   EGFR 51.7* 55.6* 50.2* 51.2*  --  38.8* 46.0*   GLUCOSE 160* 142* 58* 93  --  76 127*   CALCIUM 8.2* 8.5* 8.4* 8.8  --  8.2* 8.6   MAGNESIUM  --   --  2.1 1.9  --  2.1 1.8           Lab 09/14/23  0300 09/13/23  0305 09/12/23  0349 09/11/23  0412 09/10/23  0343   TOTAL PROTEIN 6.0 6.1 5.9* 6.6 5.7*   ALBUMIN 2.9* 2.8* 2.8* 3.2* 2.7*   GLOBULIN 3.1 3.3 3.1 3.4 3.0   ALT (SGPT) <5 5 5 7 7   AST (SGOT) 12 9 9 13 11   BILIRUBIN 0.2 0.2 0.2 0.3 0.3   ALK PHOS 65 66 69 79 62                       Brief Urine Lab Results  (Last result in the past 365 days)        Color   Clarity   Blood   Leuk Est   Nitrite   Protein   CREAT   Urine HCG        09/11/23 1536 Yellow   Turbid   Large (3+)   Large (3+)   Negative   100 mg/dL (2+)                   Microbiology Results Abnormal       Procedure Component Value - Date/Time    Blood Culture - Blood, Hand, Right [[id number]]  (Normal) Collected: 09/09/23 9916    Lab Status: Final result Specimen: Blood from Hand, Right Updated: 09/14/23 1845     Blood Culture No growth at 5 days    Blood Culture - Blood, Hand, Left [[id number]]  (Normal) Collected: 09/09/23 1747    Lab Status: Final result Specimen: Blood from Hand, Left Updated: 09/14/23 1845     Blood Culture No growth at 5 days    Urine Culture - Urine, Urine, Random Void [[id number]]  (Normal) Collected: 09/11/23 1536    Lab Status: Final result Specimen: Urine, Random Void Updated: 09/13/23 0747     Urine Culture No growth    Blood Culture - Blood, Hand, Left [[id number]]   (Normal) Collected: 09/07/23 0752    Lab Status: Final result Specimen: Blood from Hand, Left Updated: 09/12/23 0815     Blood Culture No growth at 5 days    Narrative:      Aerobic bottle only      Blood Culture - Blood, Hand, Right [[id number]]  (Normal) Collected: 09/07/23 0750    Lab Status: Final result Specimen: Blood from Hand, Right Updated: 09/12/23 0815     Blood Culture No growth at 5 days    Respiratory Panel PCR w/COVID-19(SARS-CoV-2) [facility]UAN ALBERTO/[facility]Y/[facility]ALINA In-House, NP Swab in UTM/VTM, 3-4 HR TAT - Swab, Nasopharynx [[id number]]  (Normal) Collected: 09/08/23 1714    Lab Status: Final result Specimen: Swab from Nasopharynx Updated: 09/08/23 1818     ADENOVIRUS, PCR Not Detected     Coronavirus 229E Not Detected     Coronavirus HKU1 Not Detected     Coronavirus NL63 Not Detected     Coronavirus OC43 Not Detected     COVID19 Not Detected     Human Metapneumovirus Not Detected     Human Rhinovirus/Enterovirus Not Detected     Influenza A PCR Not Detected     Influenza B PCR Not Detected     Parainfluenza Virus 1 Not Detected     Parainfluenza Virus 2 Not Detected     Parainfluenza Virus 3 Not Detected     Parainfluenza Virus 4 Not Detected     RSV, PCR Not Detected     Bordetella pertussis pcr Not Detected     Bordetella parapertussis PCR Not Detected     Chlamydophila pneumoniae PCR Not Detected     Mycoplasma pneumo by PCR Not Detected    Narrative:      In the setting of a positive respiratory panel with a viral infection PLUS a negative procalcitonin without other underlying concern for bacterial infection, consider observing off antibiotics or discontinuation of antibiotics and continue supportive care. If the respiratory panel is positive for atypical bacterial infection (Bordetella pertussis, Chlamydophila pneumoniae, or Mycoplasma pneumoniae), consider antibiotic de-escalation to target atypical bacterial infection.    Respiratory Panel PCR w/COVID-19(SARS-CoV-2) [facility]UAN ALBERTO/[facility]Y/[facility]ALINA  In-House, NP Swab in [facility], 3-4 HR TAT - Swab, Nasopharynx [[id number]]  (Normal) Collected: 09/06/23 0620    Lab Status: Final result Specimen: Swab from Nasopharynx Updated: 09/06/23 0744     ADENOVIRUS, PCR Not Detected     Coronavirus 229E Not Detected     Coronavirus HKU1 Not Detected     Coronavirus NL63 Not Detected     Coronavirus OC43 Not Detected     COVID19 Not Detected     Human Metapneumovirus Not Detected     Human Rhinovirus/Enterovirus Not Detected     Influenza A PCR Not Detected     Influenza B PCR Not Detected     Parainfluenza Virus 1 Not Detected     Parainfluenza Virus 2 Not Detected     Parainfluenza Virus 3 Not Detected     Parainfluenza Virus 4 Not Detected     RSV, PCR Not Detected     Bordetella pertussis pcr Not Detected     Bordetella parapertussis PCR Not Detected     Chlamydophila pneumoniae PCR Not Detected     Mycoplasma pneumo by PCR Not Detected    Narrative:      In the setting of a positive respiratory panel with a viral infection PLUS a negative procalcitonin without other underlying concern for bacterial infection, consider observing off antibiotics or discontinuation of antibiotics and continue supportive care. If the respiratory panel is positive for atypical bacterial infection (Bordetella pertussis, Chlamydophila pneumoniae, or Mycoplasma pneumoniae), consider antibiotic de-escalation to target atypical bacterial infection.            No radiology results from the last 24 hrs    Results for orders placed during the hospital encounter of 12/06/22    Adult Transthoracic Echo Complete W/ Cont if Necessary Per Protocol    Interpretation Summary    Left ventricular ejection fraction appears to be greater than 70%.    Left ventricular wall thickness is consistent with moderate concentric hypertrophy.    Left ventricular diastolic function is consistent with (grade I) impaired relaxation.    Estimated right ventricular systolic pressure from tricuspid regurgitation is mildly  elevated (35-45 mmHg). Calculated right ventricular systolic pressure from tricuspid regurgitation is 39 mmHg.      Current medications:  Scheduled Meds:amLODIPine, 10 mg, Oral, Q24H  amoxicillin-clavulanate, 1 tablet, Oral, Q12H  carvedilol, 12.5 mg, Oral, BID With Meals  famotidine, 20 mg, Oral, QAM  ferrous sulfate, 325 mg, Oral, Daily With Breakfast  hydrALAZINE, 50 mg, Oral, Q8H  insulin detemir, 5 Units, Subcutaneous, Daily  insulin lispro, 2-7 Units, Subcutaneous, 4x Daily AC & at Bedtime  lactobacillus acidophilus, 1 capsule, Oral, Daily  Lidocaine, 1 patch, Transdermal, Q24H  losartan, 50 mg, Oral, Q24H  mirtazapine, 7.5 mg, Oral, Nightly  multivitamin, 1 tablet, Oral, Daily  rivaroxaban, 15 mg, Oral, Daily With Dinner  sodium chloride, 10 mL, Intravenous, Q12H  terazosin, 1 mg, Oral, Nightly  vancomycin, 125 mg, Oral, BID      Continuous Infusions:   PRN Meds:.  acetaminophen    dextrose    dextrose    glucagon (human recombinant)    hydrALAZINE    melatonin    ondansetron **OR** ondansetron    oxyCODONE-acetaminophen    Potassium Replacement - Follow Nurse / BPA Driven Protocol    prochlorperazine **OR** prochlorperazine **OR** prochlorperazine    [COMPLETED] Insert Peripheral IV **AND** sodium chloride    sodium chloride    sodium chloride    Assessment & Plan   Assessment & Plan     Active Hospital Problems    Diagnosis  POA    **Hypertensive urgency [I16.0]  Yes    Acute UTI (urinary tract infection) [N39.0]  Yes    Nausea vomiting and diarrhea [R11.2, R19.7]  Yes    PAF (paroxysmal atrial fibrillation) [I48.0]  Yes    Anxiety associated with depression [F41.8]  Yes    Clavicle fracture [S42.009A]  Yes    CKD (chronic kidney disease) stage 3, GFR 30-59 ml/min [N18.30]  Yes    Hypokalemia [E87.6]  Yes    Gastroparesis [K31.84]  Yes    Uncontrolled type 1 diabetes mellitus with hyperglycemia [E10.65]  Yes    Hyperlipidemia [E78.5]  Yes    Hypertension [I10]  Yes      Resolved Hospital Problems   No  resolved problems to display.        Brief Hospital Course to date:  Thelma Michael is a 62 y.o. female w/ a hx of poorly controlled type 1 diabetes, Afib on Xarelto, iron deficiency anemia, gastroparesis s/p gastric stimulator, HTN, HLD, migraines, chronic urinary retention (previously performed self-catheterization), strokes (chronic right lentiform nucleus and left cerebellar lacunar infarcts found on MRI in February of 2023), tobacco abuse and GERD who presented to the emergency room with complaints of nausea, vomiting and diarrhea. Patient has had issues with fevers since the evening of 9/8, repeat blood cultures have been NGTD and no obvious new symptoms. ID consulted, repeat blood cultures from 09/09 NGTD. ID recommending zosyn, transitioned to Augmentin. Oral vancomycin for C diff prophylaxis.      Positive blood cultures 2/2  Abdominal pain, Sepsis   Fever  Right middle/right lower lobe pneumonia  - suspected contaminate, Vancomycin then stopped,-repeat BC obtained NGTD  - ID following with repeat blood cultures on 9/9, currently negative   -- patient continued to have abdominal pain, and intermittent fevers, on 09/12, repeat CT abd/pelvis is negative for acute findings of abdomen, CT chest is suggestive of right middle and lower lobe pneumonia   - Previous procal and LA negative, repeat is equally negative  --ID following she is s/pIV Zosyn t per ID, lost IV access, has been transitioned to Augmentin for now with oral vanco for C diff prophylaxis.      hypertensive urgency   History of hypertension  -She is status post Cardene gtt. BP much improved  -Continue p.o. hydralazine, Coreg, Norvasc, terazosin  -09/12 home losartan restarted as renal function improved.     Poorly controlled type 1 diabetes A1c 11.1%   -FSBG's reviewed labile, with hypo and hyperglycemia, currently stable  -Continue basal and SSI, adjust as warranted  -diabetic educator consult       Nausea, vomiting, diarrhea   Hx of  gastroparesis   --CT abd/pel without acute findings other than bladder wall thickening c/w chronic cystitis   --GI PCR + Enteroaggregative E.coli -- likely colonization.  --ID consult has been requested, continue to follow.   --Continued supportive care at this time     Acute UTI   Hx of neurogenic bladder (previously self-cath'd)  -UA w/ 4+ bacteria, 31-50 WBCs   -urine culture > 100k GNB  -previous urine culture + for enterococcus faecalis in 4/23 (previous results w/ mixed sameera) 09/05 (+) Ecoli  -Antibiotics as above per ID  -bladder scan q 4      Hypokalemia-resolved  Continue to monitor and replete per protocol     CKD Stage III  -Renal function seems to be stable and within her baseline  -s/p IV fluids  -monitor closely with N/V/D and minimal oral intake      PAF   -continue coreg, Xarelto      Expected Discharge Location and Transportation: In patient rehab  Expected Discharge   Expected Discharge Date: 9/16/2023; Expected Discharge Time:      DVT prophylaxis:  Medical DVT prophylaxis orders are present.     AM-PAC 6 Clicks Score (PT): 12 (09/15/23 0141)    CODE STATUS:   Code Status and Medical Interventions:   Ordered at: 09/06/23 0563     Code Status (Patient has no pulse and is not breathing):    CPR (Attempt to Resuscitate)     Medical Interventions (Patient has pulse or is breathing):    Full Support       Laila García, HOWIE  09/15/23

## 2023-09-15 NOTE — PLAN OF CARE
Goal Outcome Evaluation:         VSS. NSR on tele. Several loose BM's today. Daughter did visit today.

## 2023-09-16 LAB
GLUCOSE BLDC GLUCOMTR-MCNC: 133 MG/DL (ref 70–130)
GLUCOSE BLDC GLUCOMTR-MCNC: 172 MG/DL (ref 70–130)
GLUCOSE BLDC GLUCOMTR-MCNC: 189 MG/DL (ref 70–130)
GLUCOSE BLDC GLUCOMTR-MCNC: 190 MG/DL (ref 70–130)

## 2023-09-16 PROCEDURE — 82948 REAGENT STRIP/BLOOD GLUCOSE: CPT

## 2023-09-16 PROCEDURE — 99231 SBSQ HOSP IP/OBS SF/LOW 25: CPT | Performed by: NURSE PRACTITIONER

## 2023-09-16 PROCEDURE — 63710000001 INSULIN LISPRO (HUMAN) PER 5 UNITS: Performed by: NURSE PRACTITIONER

## 2023-09-16 PROCEDURE — 63710000001 INSULIN DETEMIR PER 5 UNITS: Performed by: INTERNAL MEDICINE

## 2023-09-16 RX ADMIN — FAMOTIDINE 20 MG: 20 TABLET ORAL at 06:27

## 2023-09-16 RX ADMIN — Medication 1 CAPSULE: at 08:24

## 2023-09-16 RX ADMIN — CARVEDILOL 12.5 MG: 12.5 TABLET, FILM COATED ORAL at 17:15

## 2023-09-16 RX ADMIN — TERAZOSIN HYDROCHLORIDE 1 MG: 1 CAPSULE ORAL at 21:11

## 2023-09-16 RX ADMIN — RIVAROXABAN 15 MG: 15 TABLET, FILM COATED ORAL at 17:15

## 2023-09-16 RX ADMIN — AMLODIPINE BESYLATE 10 MG: 10 TABLET ORAL at 08:24

## 2023-09-16 RX ADMIN — INSULIN DETEMIR 5 UNITS: 100 INJECTION, SOLUTION SUBCUTANEOUS at 08:25

## 2023-09-16 RX ADMIN — HYDRALAZINE HYDROCHLORIDE 50 MG: 50 TABLET, FILM COATED ORAL at 06:27

## 2023-09-16 RX ADMIN — LOSARTAN POTASSIUM 50 MG: 50 TABLET, FILM COATED ORAL at 08:25

## 2023-09-16 RX ADMIN — VANCOMYCIN HYDROCHLORIDE 125 MG: 125 CAPSULE ORAL at 21:10

## 2023-09-16 RX ADMIN — LIDOCAINE 1 PATCH: 560 PATCH PERCUTANEOUS; TOPICAL; TRANSDERMAL at 08:25

## 2023-09-16 RX ADMIN — VANCOMYCIN HYDROCHLORIDE 125 MG: 125 CAPSULE ORAL at 08:24

## 2023-09-16 RX ADMIN — MULTIVITAMIN TABLET 1 TABLET: TABLET at 08:24

## 2023-09-16 RX ADMIN — MIRTAZAPINE 7.5 MG: 15 TABLET, FILM COATED ORAL at 21:11

## 2023-09-16 RX ADMIN — AMOXICILLIN AND CLAVULANATE POTASSIUM 1 TABLET: 875; 125 TABLET, FILM COATED ORAL at 21:10

## 2023-09-16 RX ADMIN — HYDRALAZINE HYDROCHLORIDE 50 MG: 50 TABLET, FILM COATED ORAL at 21:11

## 2023-09-16 RX ADMIN — FERROUS SULFATE TAB 325 MG (65 MG ELEMENTAL FE) 325 MG: 325 (65 FE) TAB at 08:25

## 2023-09-16 RX ADMIN — AMOXICILLIN AND CLAVULANATE POTASSIUM 1 TABLET: 875; 125 TABLET, FILM COATED ORAL at 08:24

## 2023-09-16 RX ADMIN — INSULIN LISPRO 2 UNITS: 100 INJECTION, SOLUTION INTRAVENOUS; SUBCUTANEOUS at 17:15

## 2023-09-16 RX ADMIN — INSULIN LISPRO 2 UNITS: 100 INJECTION, SOLUTION INTRAVENOUS; SUBCUTANEOUS at 21:10

## 2023-09-16 RX ADMIN — Medication 5 MG: at 21:10

## 2023-09-16 RX ADMIN — HYDRALAZINE HYDROCHLORIDE 50 MG: 50 TABLET, FILM COATED ORAL at 14:30

## 2023-09-16 RX ADMIN — CARVEDILOL 12.5 MG: 12.5 TABLET, FILM COATED ORAL at 08:24

## 2023-09-16 RX ADMIN — OXYCODONE HYDROCHLORIDE AND ACETAMINOPHEN 1 TABLET: 5; 325 TABLET ORAL at 21:10

## 2023-09-16 RX ADMIN — INSULIN LISPRO 2 UNITS: 100 INJECTION, SOLUTION INTRAVENOUS; SUBCUTANEOUS at 08:25

## 2023-09-16 NOTE — PROGRESS NOTES
[facility] Medicine Services  PROGRESS NOTE    Patient Name: Thelma Michael  : 1960  MRN: [id number]    Date of Admission: 2023  Primary Care Physician: Monet Howe APRN    Subjective   Subjective     CC: Follow-up pneumonia, fever      HPI:   Pt sitting up in bed, pleasant and talkative. Denies coughing, shortness of breath. Pt took a bath early this morning as reported by nursing staff. Denies any issues today. Updated the patient's daughter on the patient's condition today.     Daughter would like an update daily and wishes for her mother to have a bath daily. (Kelsy Zavala [phone]).    Copied text in this note has been reviewed and is accurate as of 23.       Objective   Objective     Vital Signs:   Temp:  [98.3 °F (36.8 °C)-99.4 °F (37.4 °C)] 98.3 °F (36.8 °C)  Heart Rate:  [71-78] 72  Resp:  [16] 16  BP: (125-158)/(63-79) 158/79     Physical Exam:  Constitutional: Awake, alert, Frail appearing  HENT: NCAT, mucous membranes moist  Respiratory: Clear to auscultation bilaterally, nonlabored respirations 97%RA  Cardiovascular: RRR, no murmurs, rubs, or gallops HR 77  Gastrointestinal: Positive bowel sounds, soft, nontender, nondistended  Musculoskeletal: No bilateral ankle edema  Psychiatric: Appropriate affect, cooperative  Neurologic: Oriented x 3, non focal, MARCUM, speech clear  Skin: No rashes        Results Reviewed:  LAB RESULTS:      Lab 23  0300 23  0305 23  0349 23  1602 23  0412 09/10/23  0343 23  1747   WBC 8.35 8.80 10.14  --  13.98* 6.77  --    HEMOGLOBIN 9.9* 9.9* 9.8*  --  11.6* 10.4*  --    HEMATOCRIT 30.4* 30.0* 30.0*  --  35.1 32.4*  --    PLATELETS 530* 470* 426  --  417 306  --    NEUTROS ABS 5.32 6.22 7.43*  --  11.51* 3.68  --    IMMATURE GRANS (ABS) 0.15* 0.12* 0.08*  --  0.06* 0.04  --    LYMPHS ABS 1.96 1.61 1.53  --  1.40 2.27  --    MONOS ABS 0.78 0.69 0.91*  --  0.96* 0.73  --     EOS ABS 0.12 0.12 0.17  --  0.02 0.02  --    MCV 89.4 89.6 89.8  --  88.6 90.8  --    PROCALCITONIN  --   --   --  0.15  --   --   --    LACTATE  --   --   --  1.5  --   --  1.0           Lab 09/14/23  0300 09/13/23  0305 09/12/23  0349 09/11/23  0412 09/10/23  1545 09/10/23  0343   SODIUM 141 143 141 143  --  141   POTASSIUM 4.0 4.1 3.8 4.1 5.2 3.4*   CHLORIDE 106 110* 109* 112*  --  111*   CO2 24.0 18.0* 23.0 22.0  --  22.0   ANION GAP 11.0 15.0 9.0 9.0  --  8.0   BUN 20 15 14 15  --  25*   CREATININE 1.18* 1.11* 1.21* 1.19*  --  1.50*   EGFR 51.7* 55.6* 50.2* 51.2*  --  38.8*   GLUCOSE 160* 142* 58* 93  --  76   CALCIUM 8.2* 8.5* 8.4* 8.8  --  8.2*   MAGNESIUM  --   --  2.1 1.9  --  2.1           Lab 09/14/23 0300 09/13/23 0305 09/12/23 0349 09/11/23  0412 09/10/23  0343   TOTAL PROTEIN 6.0 6.1 5.9* 6.6 5.7*   ALBUMIN 2.9* 2.8* 2.8* 3.2* 2.7*   GLOBULIN 3.1 3.3 3.1 3.4 3.0   ALT (SGPT) <5 5 5 7 7   AST (SGOT) 12 9 9 13 11   BILIRUBIN 0.2 0.2 0.2 0.3 0.3   ALK PHOS 65 66 69 79 62                       Brief Urine Lab Results  (Last result in the past 365 days)        Color   Clarity   Blood   Leuk Est   Nitrite   Protein   CREAT   Urine HCG        09/11/23 1536 Yellow   Turbid   Large (3+)   Large (3+)   Negative   100 mg/dL (2+)                   Microbiology Results Abnormal       Procedure Component Value - Date/Time    Blood Culture - Blood, Hand, Right [[id number]]  (Normal) Collected: 09/09/23 1751    Lab Status: Final result Specimen: Blood from Hand, Right Updated: 09/14/23 1845     Blood Culture No growth at 5 days    Blood Culture - Blood, Hand, Left [[id number]]  (Normal) Collected: 09/09/23 1747    Lab Status: Final result Specimen: Blood from Hand, Left Updated: 09/14/23 1845     Blood Culture No growth at 5 days    Urine Culture - Urine, Urine, Random Void [[id number]]  (Normal) Collected: 09/11/23 1536    Lab Status: Final result Specimen: Urine, Random Void Updated: 09/13/23 0747     Urine  Culture No growth    Blood Culture - Blood, Hand, Left [[id number]]  (Normal) Collected: 09/07/23 0752    Lab Status: Final result Specimen: Blood from Hand, Left Updated: 09/12/23 0815     Blood Culture No growth at 5 days    Narrative:      Aerobic bottle only      Blood Culture - Blood, Hand, Right [[id number]]  (Normal) Collected: 09/07/23 0750    Lab Status: Final result Specimen: Blood from Hand, Right Updated: 09/12/23 0815     Blood Culture No growth at 5 days    Respiratory Panel PCR w/COVID-19(SARS-CoV-2) [facility]UAN [facility]BERTO/EDY/[facility]AL[facility] In-House, NP Swab in UTM/VTM, 3-4 HR TAT - Swab, Nasopharynx [[id number]]  (Normal) Collected: 09/08/23 1714    Lab Status: Final result Specimen: Swab from Nasopharynx Updated: 09/08/23 1818     ADENOVIRUS, PCR Not Detected     Coronavirus 229E Not Detected     Coronavirus HKU1 Not Detected     Coronavirus NL63 Not Detected     Coronavirus OC43 Not Detected     COVID19 Not Detected     Human Metapneumovirus Not Detected     Human Rhinovirus/Enterovirus Not Detected     Influenza A PCR Not Detected     Influenza B PCR Not Detected     Parainfluenza Virus 1 Not Detected     Parainfluenza Virus 2 Not Detected     Parainfluenza Virus 3 Not Detected     Parainfluenza Virus 4 Not Detected     RSV, PCR Not Detected     Bordetella pertussis pcr Not Detected     Bordetella parapertussis PCR Not Detected     Chlamydophila pneumoniae PCR Not Detected     Mycoplasma pneumo by PCR Not Detected    Narrative:      In the setting of a positive respiratory panel with a viral infection PLUS a negative procalcitonin without other underlying concern for bacterial infection, consider observing off antibiotics or discontinuation of antibiotics and continue supportive care. If the respiratory panel is positive for atypical bacterial infection (Bordetella pertussis, Chlamydophila pneumoniae, or Mycoplasma pneumoniae), consider antibiotic de-escalation to target atypical bacterial infection.     Respiratory Panel PCR w/COVID-19(SARS-CoV-2) [facility]UAN ALBERTO/[facility]Y/[facility] In-House, NP Swab in UTM/VTM, 3-4 HR TAT - Swab, Nasopharynx [[id number]]  (Normal) Collected: 09/06/23 0620    Lab Status: Final result Specimen: Swab from Nasopharynx Updated: 09/06/23 0744     ADENOVIRUS, PCR Not Detected     Coronavirus 229E Not Detected     Coronavirus HKU1 Not Detected     Coronavirus NL63 Not Detected     Coronavirus OC43 Not Detected     COVID19 Not Detected     Human Metapneumovirus Not Detected     Human Rhinovirus/Enterovirus Not Detected     Influenza A PCR Not Detected     Influenza B PCR Not Detected     Parainfluenza Virus 1 Not Detected     Parainfluenza Virus 2 Not Detected     Parainfluenza Virus 3 Not Detected     Parainfluenza Virus 4 Not Detected     RSV, PCR Not Detected     Bordetella pertussis pcr Not Detected     Bordetella parapertussis PCR Not Detected     Chlamydophila pneumoniae PCR Not Detected     Mycoplasma pneumo by PCR Not Detected    Narrative:      In the setting of a positive respiratory panel with a viral infection PLUS a negative procalcitonin without other underlying concern for bacterial infection, consider observing off antibiotics or discontinuation of antibiotics and continue supportive care. If the respiratory panel is positive for atypical bacterial infection (Bordetella pertussis, Chlamydophila pneumoniae, or Mycoplasma pneumoniae), consider antibiotic de-escalation to target atypical bacterial infection.            No radiology results from the last 24 hrs    Results for orders placed during the hospital encounter of 12/06/22    Adult Transthoracic Echo Complete W/ Cont if Necessary Per Protocol    Interpretation Summary    Left ventricular ejection fraction appears to be greater than 70%.    Left ventricular wall thickness is consistent with moderate concentric hypertrophy.    Left ventricular diastolic function is consistent with (grade I) impaired relaxation.    Estimated right  ventricular systolic pressure from tricuspid regurgitation is mildly elevated (35-45 mmHg). Calculated right ventricular systolic pressure from tricuspid regurgitation is 39 mmHg.      Current medications:  Scheduled Meds:amLODIPine, 10 mg, Oral, Q24H  amoxicillin-clavulanate, 1 tablet, Oral, Q12H  carvedilol, 12.5 mg, Oral, BID With Meals  famotidine, 20 mg, Oral, QAM  ferrous sulfate, 325 mg, Oral, Daily With Breakfast  hydrALAZINE, 50 mg, Oral, Q8H  insulin detemir, 5 Units, Subcutaneous, Daily  insulin lispro, 2-7 Units, Subcutaneous, 4x Daily AC & at Bedtime  lactobacillus acidophilus, 1 capsule, Oral, Daily  Lidocaine, 1 patch, Transdermal, Q24H  losartan, 50 mg, Oral, Q24H  mirtazapine, 7.5 mg, Oral, Nightly  multivitamin, 1 tablet, Oral, Daily  rivaroxaban, 15 mg, Oral, Daily With Dinner  sodium chloride, 10 mL, Intravenous, Q12H  terazosin, 1 mg, Oral, Nightly  vancomycin, 125 mg, Oral, BID      Continuous Infusions:   PRN Meds:.  acetaminophen    dextrose    dextrose    glucagon (human recombinant)    hydrALAZINE    melatonin    ondansetron **OR** ondansetron    oxyCODONE-acetaminophen    Potassium Replacement - Follow Nurse / BPA Driven Protocol    prochlorperazine **OR** prochlorperazine **OR** prochlorperazine    [COMPLETED] Insert Peripheral IV **AND** sodium chloride    sodium chloride    sodium chloride    Assessment & Plan   Assessment & Plan     Active Hospital Problems    Diagnosis  POA    **Hypertensive urgency [I16.0]  Yes    Acute UTI (urinary tract infection) [N39.0]  Yes    Nausea vomiting and diarrhea [R11.2, R19.7]  Yes    PAF (paroxysmal atrial fibrillation) [I48.0]  Yes    Anxiety associated with depression [F41.8]  Yes    Clavicle fracture [S42.009A]  Yes    CKD (chronic kidney disease) stage 3, GFR 30-59 ml/min [N18.30]  Yes    Hypokalemia [E87.6]  Yes    Gastroparesis [K31.84]  Yes    Uncontrolled type 1 diabetes mellitus with hyperglycemia [E10.65]  Yes    Hyperlipidemia [E78.5]  Yes     Hypertension [I10]  Yes      Resolved Hospital Problems   No resolved problems to display.        Brief Hospital Course to date:  Thelma Michael is a 62 y.o. female w/ a hx of poorly controlled type 1 diabetes, Afib on Xarelto, iron deficiency anemia, gastroparesis s/p gastric stimulator, HTN, HLD, migraines, chronic urinary retention (previously performed self-catheterization), strokes (chronic right lentiform nucleus and left cerebellar lacunar infarcts found on MRI in February of 2023), tobacco abuse and GERD who presented to the emergency room with complaints of nausea, vomiting and diarrhea. Patient has had issues with fevers since the evening of 9/8, repeat blood cultures have been NGTD and no obvious new symptoms. ID consulted, repeat blood cultures from 09/09 NGTD. ID recommending zosyn, transitioned to Augmentin. Oral vancomycin for C diff prophylaxis.      Positive blood cultures 2/2  Abdominal pain, Sepsis   Fever  Right middle/right lower lobe pneumonia  - suspected contaminate, Vancomycin then stopped,-repeat BC obtained NGTD  - ID following with repeat blood cultures on 9/9, currently negative   -- patient continued to have abdominal pain, and intermittent fevers, on 09/12, repeat CT abd/pelvis is negative for acute findings of abdomen, CT chest is suggestive of right middle and lower lobe pneumonia   - Previous procal and LA negative, repeat is equally negative  --ID following she is s/pIV Zosyn t per ID, lost IV access, has been transitioned to Augmentin for now with oral vanco for C diff prophylaxis.      hypertensive urgency   History of hypertension  -She is status post Cardene gtt. BP much improved  -Continue p.o. hydralazine, Coreg, Norvasc, terazosin  -09/12 home losartan restarted as renal function improved.     Poorly controlled type 1 diabetes A1c 11.1%   -FSBG's reviewed labile, with hypo and hyperglycemia, currently stable  -Continue basal and SSI, adjust as warranted  -diabetic  educator consult       Nausea, vomiting, diarrhea   Hx of gastroparesis   --CT abd/pel without acute findings other than bladder wall thickening c/w chronic cystitis   --GI PCR + Enteroaggregative E.coli -- likely colonization.  --ID consult has been requested, continue to follow.   --Continued supportive care at this time     Acute UTI 09/05  Hx of neurogenic bladder (previously self-cath'd)  -UA w/ 4+ bacteria, 31-50 WBCs   -urine culture > 100k GNB  -previous urine culture + for enterococcus faecalis in 4/23 (previous results w/ mixed sameera) 09/05 (+) Ecoli  -Antibiotics as above per ID  -bladder scan q 4  -urine culture from 09/11 showed no growth.       Hypokalemia-resolved  Continue to monitor and replete per protocol     CKD Stage III  -Renal function seems to be stable and within her baseline  -s/p IV fluids  -monitor closely with N/V/D and minimal oral intake      PAF   -continue coreg, Xarelto      Expected Discharge Location and Transportation: In patient rehab  Expected Discharge   09/18/2023    DVT prophylaxis:  Medical DVT prophylaxis orders are present.     AM-PAC 6 Clicks Score (PT): 12 (09/15/23 0142)    CODE STATUS:   Code Status and Medical Interventions:   Ordered at: 09/06/23 0529     Code Status (Patient has no pulse and is not breathing):    CPR (Attempt to Resuscitate)     Medical Interventions (Patient has pulse or is breathing):    Full Support       Laila García, HOWIE  09/16/23

## 2023-09-17 LAB
ANION GAP SERPL CALCULATED.3IONS-SCNC: 8 MMOL/L (ref 5–15)
BASOPHILS # BLD AUTO: 0.03 10*3/MM3 (ref 0–0.2)
BASOPHILS NFR BLD AUTO: 0.4 % (ref 0–1.5)
BUN SERPL-MCNC: 17 MG/DL (ref 8–23)
BUN/CREAT SERPL: 15.3 (ref 7–25)
CALCIUM SPEC-SCNC: 8.7 MG/DL (ref 8.6–10.5)
CHLORIDE SERPL-SCNC: 109 MMOL/L (ref 98–107)
CO2 SERPL-SCNC: 24 MMOL/L (ref 22–29)
CREAT SERPL-MCNC: 1.11 MG/DL (ref 0.57–1)
DEPRECATED RDW RBC AUTO: 45.9 FL (ref 37–54)
EGFRCR SERPLBLD CKD-EPI 2021: 55.6 ML/MIN/1.73
EOSINOPHIL # BLD AUTO: 0.12 10*3/MM3 (ref 0–0.4)
EOSINOPHIL NFR BLD AUTO: 1.4 % (ref 0.3–6.2)
ERYTHROCYTE [DISTWIDTH] IN BLOOD BY AUTOMATED COUNT: 13.7 % (ref 12.3–15.4)
GLUCOSE BLDC GLUCOMTR-MCNC: 168 MG/DL (ref 70–130)
GLUCOSE BLDC GLUCOMTR-MCNC: 181 MG/DL (ref 70–130)
GLUCOSE BLDC GLUCOMTR-MCNC: 194 MG/DL (ref 70–130)
GLUCOSE BLDC GLUCOMTR-MCNC: 273 MG/DL (ref 70–130)
GLUCOSE SERPL-MCNC: 234 MG/DL (ref 65–99)
HCT VFR BLD AUTO: 29.2 % (ref 34–46.6)
HGB BLD-MCNC: 9.4 G/DL (ref 12–15.9)
IMM GRANULOCYTES # BLD AUTO: 0.15 10*3/MM3 (ref 0–0.05)
IMM GRANULOCYTES NFR BLD AUTO: 1.8 % (ref 0–0.5)
LYMPHOCYTES # BLD AUTO: 2.16 10*3/MM3 (ref 0.7–3.1)
LYMPHOCYTES NFR BLD AUTO: 25.4 % (ref 19.6–45.3)
MCH RBC QN AUTO: 29.5 PG (ref 26.6–33)
MCHC RBC AUTO-ENTMCNC: 32.2 G/DL (ref 31.5–35.7)
MCV RBC AUTO: 91.5 FL (ref 79–97)
MONOCYTES # BLD AUTO: 0.69 10*3/MM3 (ref 0.1–0.9)
MONOCYTES NFR BLD AUTO: 8.1 % (ref 5–12)
NEUTROPHILS NFR BLD AUTO: 5.37 10*3/MM3 (ref 1.7–7)
NEUTROPHILS NFR BLD AUTO: 62.9 % (ref 42.7–76)
NRBC BLD AUTO-RTO: 0 /100 WBC (ref 0–0.2)
PLATELET # BLD AUTO: 530 10*3/MM3 (ref 140–450)
PMV BLD AUTO: 10.1 FL (ref 6–12)
POTASSIUM SERPL-SCNC: 4.3 MMOL/L (ref 3.5–5.2)
RBC # BLD AUTO: 3.19 10*6/MM3 (ref 3.77–5.28)
SODIUM SERPL-SCNC: 141 MMOL/L (ref 136–145)
WBC NRBC COR # BLD: 8.52 10*3/MM3 (ref 3.4–10.8)

## 2023-09-17 PROCEDURE — 80048 BASIC METABOLIC PNL TOTAL CA: CPT | Performed by: NURSE PRACTITIONER

## 2023-09-17 PROCEDURE — 82948 REAGENT STRIP/BLOOD GLUCOSE: CPT

## 2023-09-17 PROCEDURE — 99233 SBSQ HOSP IP/OBS HIGH 50: CPT | Performed by: FAMILY MEDICINE

## 2023-09-17 PROCEDURE — 63710000001 INSULIN LISPRO (HUMAN) PER 5 UNITS: Performed by: NURSE PRACTITIONER

## 2023-09-17 PROCEDURE — 85025 COMPLETE CBC W/AUTO DIFF WBC: CPT | Performed by: NURSE PRACTITIONER

## 2023-09-17 PROCEDURE — 63710000001 INSULIN DETEMIR PER 5 UNITS: Performed by: INTERNAL MEDICINE

## 2023-09-17 RX ADMIN — TERAZOSIN HYDROCHLORIDE 1 MG: 1 CAPSULE ORAL at 20:39

## 2023-09-17 RX ADMIN — FAMOTIDINE 20 MG: 20 TABLET ORAL at 05:53

## 2023-09-17 RX ADMIN — Medication 1 CAPSULE: at 08:55

## 2023-09-17 RX ADMIN — CARVEDILOL 12.5 MG: 12.5 TABLET, FILM COATED ORAL at 08:55

## 2023-09-17 RX ADMIN — HYDRALAZINE HYDROCHLORIDE 50 MG: 50 TABLET, FILM COATED ORAL at 13:05

## 2023-09-17 RX ADMIN — VANCOMYCIN HYDROCHLORIDE 125 MG: 125 CAPSULE ORAL at 20:38

## 2023-09-17 RX ADMIN — LOSARTAN POTASSIUM 50 MG: 50 TABLET, FILM COATED ORAL at 08:55

## 2023-09-17 RX ADMIN — OXYCODONE HYDROCHLORIDE AND ACETAMINOPHEN 1 TABLET: 5; 325 TABLET ORAL at 20:43

## 2023-09-17 RX ADMIN — AMLODIPINE BESYLATE 10 MG: 10 TABLET ORAL at 08:55

## 2023-09-17 RX ADMIN — MULTIVITAMIN TABLET 1 TABLET: TABLET at 08:55

## 2023-09-17 RX ADMIN — INSULIN LISPRO 4 UNITS: 100 INJECTION, SOLUTION INTRAVENOUS; SUBCUTANEOUS at 20:38

## 2023-09-17 RX ADMIN — FERROUS SULFATE TAB 325 MG (65 MG ELEMENTAL FE) 325 MG: 325 (65 FE) TAB at 08:55

## 2023-09-17 RX ADMIN — INSULIN LISPRO 2 UNITS: 100 INJECTION, SOLUTION INTRAVENOUS; SUBCUTANEOUS at 08:55

## 2023-09-17 RX ADMIN — AMOXICILLIN AND CLAVULANATE POTASSIUM 1 TABLET: 875; 125 TABLET, FILM COATED ORAL at 08:55

## 2023-09-17 RX ADMIN — CARVEDILOL 12.5 MG: 12.5 TABLET, FILM COATED ORAL at 17:00

## 2023-09-17 RX ADMIN — HYDRALAZINE HYDROCHLORIDE 50 MG: 50 TABLET, FILM COATED ORAL at 20:38

## 2023-09-17 RX ADMIN — MIRTAZAPINE 7.5 MG: 15 TABLET, FILM COATED ORAL at 20:38

## 2023-09-17 RX ADMIN — INSULIN LISPRO 2 UNITS: 100 INJECTION, SOLUTION INTRAVENOUS; SUBCUTANEOUS at 11:39

## 2023-09-17 RX ADMIN — VANCOMYCIN HYDROCHLORIDE 125 MG: 125 CAPSULE ORAL at 08:55

## 2023-09-17 RX ADMIN — RIVAROXABAN 15 MG: 15 TABLET, FILM COATED ORAL at 17:00

## 2023-09-17 RX ADMIN — INSULIN LISPRO 2 UNITS: 100 INJECTION, SOLUTION INTRAVENOUS; SUBCUTANEOUS at 16:54

## 2023-09-17 RX ADMIN — Medication 5 MG: at 20:38

## 2023-09-17 RX ADMIN — AMOXICILLIN AND CLAVULANATE POTASSIUM 1 TABLET: 875; 125 TABLET, FILM COATED ORAL at 20:38

## 2023-09-17 RX ADMIN — INSULIN DETEMIR 5 UNITS: 100 INJECTION, SOLUTION SUBCUTANEOUS at 08:56

## 2023-09-17 RX ADMIN — LIDOCAINE 1 PATCH: 560 PATCH PERCUTANEOUS; TOPICAL; TRANSDERMAL at 08:57

## 2023-09-17 RX ADMIN — HYDRALAZINE HYDROCHLORIDE 50 MG: 50 TABLET, FILM COATED ORAL at 05:53

## 2023-09-17 NOTE — PLAN OF CARE
Goal Outcome Evaluation:   Patient alert, oriented and pleasant. VSS. Oxygen saturation stable on room air. NSR with PACs on tele. Bladder scan at 1400 reveals 416ml, I&O cath produced 500ml. Patient cont to have incontinent loose stools. D/C plan remains placement at  for rehab. Plan of care is ongoing. Bed in lowest position and call light within reach.

## 2023-09-17 NOTE — PLAN OF CARE
Goal Outcome Evaluation:  Plan of Care Reviewed With: patient   Patient is alert and oriented x4. VSS. Shoulder pain PRN pain medication given with good results. Pt still noted incontinent with loose stool. Resting comfortably in bed, alarms on and audible, bed in lowest position, call light within reach. Will continue to monitor.       Progress: improving

## 2023-09-17 NOTE — PROGRESS NOTES
[facility]  PROGRESS NOTE    Patient Name: Thelma Michael  : 1960  MRN: [id number]    Date of Admission: 2023  Primary Care Physician: Monet Howe APRN    Subjective   Subjective     CC: Follow-up pneumonia    HPI:   Patient seen and examined. Doing well, lying in bed. No complaints. Awaiting rehab.    Objective   Objective     Vital Signs:   Temp:  [98.2 °F (36.8 °C)-98.9 °F (37.2 °C)] 98.5 °F (36.9 °C)  Heart Rate:  [68-82] 68  Resp:  [16] 16  BP: (120-158)/(60-79) 121/70     Physical Exam:  Constitutional: No acute distress, awake, alert, frail appearing   HENT: NCAT, mucous membranes moist  Respiratory: Clear to auscultation bilaterally, respiratory effort normal   Cardiovascular: RRR, no murmurs, rubs, or gallops  Gastrointestinal: Positive bowel sounds, soft, nontender, nondistended  Musculoskeletal: No bilateral ankle edema  Psychiatric: Appropriate affect, cooperative  Neurologic: No focal deficits, speech clear  Skin: No rashes     Results Reviewed:  LAB RESULTS:      Lab 23  0327 23  0300 23  0305 23  0349 23  1602 23  0412   WBC 8.52 8.35 8.80 10.14  --  13.98*   HEMOGLOBIN 9.4* 9.9* 9.9* 9.8*  --  11.6*   HEMATOCRIT 29.2* 30.4* 30.0* 30.0*  --  35.1   PLATELETS 530* 530* 470* 426  --  417   NEUTROS ABS 5.37 5.32 6.22 7.43*  --  11.51*   IMMATURE GRANS (ABS) 0.15* 0.15* 0.12* 0.08*  --  0.06*   LYMPHS ABS 2.16 1.96 1.61 1.53  --  1.40   MONOS ABS 0.69 0.78 0.69 0.91*  --  0.96*   EOS ABS 0.12 0.12 0.12 0.17  --  0.02   MCV 91.5 89.4 89.6 89.8  --  88.6   PROCALCITONIN  --   --   --   --  0.15  --    LACTATE  --   --   --   --  1.5  --          Lab 23  0328 23  0300 23  0305 23  0349 23  0412   SODIUM 141 141 143 141 143   POTASSIUM 4.3 4.0 4.1 3.8 4.1   CHLORIDE 109* 106 110* 109* 112*   CO2 24.0 24.0 18.0* 23.0 22.0   ANION GAP 8.0 11.0 15.0 9.0 9.0   BUN 17 20  15 14 15   CREATININE 1.11* 1.18* 1.11* 1.21* 1.19*   EGFR 55.6* 51.7* 55.6* 50.2* 51.2*   GLUCOSE 234* 160* 142* 58* 93   CALCIUM 8.7 8.2* 8.5* 8.4* 8.8   MAGNESIUM  --   --   --  2.1 1.9         Lab 09/14/23  0300 09/13/23  0305 09/12/23  0349 09/11/23  0412   TOTAL PROTEIN 6.0 6.1 5.9* 6.6   ALBUMIN 2.9* 2.8* 2.8* 3.2*   GLOBULIN 3.1 3.3 3.1 3.4   ALT (SGPT) <5 5 5 7   AST (SGOT) 12 9 9 13   BILIRUBIN 0.2 0.2 0.2 0.3   ALK PHOS 65 66 69 79                     Brief Urine Lab Results  (Last result in the past 365 days)        Color   Clarity   Blood   Leuk Est   Nitrite   Protein   CREAT   Urine HCG        09/11/23 1536 Yellow   Turbid   Large (3+)   Large (3+)   Negative   100 mg/dL (2+)                   Microbiology Results Abnormal       Procedure Component Value - Date/Time    Blood Culture - Blood, Hand, Right [[id number]]  (Normal) Collected: 09/09/23 1751    Lab Status: Final result Specimen: Blood from Hand, Right Updated: 09/14/23 1845     Blood Culture No growth at 5 days    Blood Culture - Blood, Hand, Left [[id number]]  (Normal) Collected: 09/09/23 1747    Lab Status: Final result Specimen: Blood from Hand, Left Updated: 09/14/23 1845     Blood Culture No growth at 5 days    Urine Culture - Urine, Urine, Random Void [[id number]]  (Normal) Collected: 09/11/23 1536    Lab Status: Final result Specimen: Urine, Random Void Updated: 09/13/23 0747     Urine Culture No growth    Blood Culture - Blood, Hand, Left [[id number]]  (Normal) Collected: 09/07/23 0752    Lab Status: Final result Specimen: Blood from Hand, Left Updated: 09/12/23 0815     Blood Culture No growth at 5 days    Narrative:      Aerobic bottle only      Blood Culture - Blood, Hand, Right [[id number]]  (Normal) Collected: 09/07/23 0750    Lab Status: Final result Specimen: Blood from Hand, Right Updated: 09/12/23 0815     Blood Culture No growth at 5 days    Respiratory Panel PCR w/COVID-19(SARS-CoV-2) NORMA/JUAN ALBERTO/EDY/PAD/COR/MAD/ROSALINA In-House, NP  Swab in UTM/VTM, 3-4 HR TAT - Swab, Nasopharynx [[id number]]  (Normal) Collected: 09/08/23 1714    Lab Status: Final result Specimen: Swab from Nasopharynx Updated: 09/08/23 1818     ADENOVIRUS, PCR Not Detected     Coronavirus 229E Not Detected     Coronavirus HKU1 Not Detected     Coronavirus NL63 Not Detected     Coronavirus OC43 Not Detected     COVID19 Not Detected     Human Metapneumovirus Not Detected     Human Rhinovirus/Enterovirus Not Detected     Influenza A PCR Not Detected     Influenza B PCR Not Detected     Parainfluenza Virus 1 Not Detected     Parainfluenza Virus 2 Not Detected     Parainfluenza Virus 3 Not Detected     Parainfluenza Virus 4 Not Detected     RSV, PCR Not Detected     Bordetella pertussis pcr Not Detected     Bordetella parapertussis PCR Not Detected     Chlamydophila pneumoniae PCR Not Detected     Mycoplasma pneumo by PCR Not Detected    Narrative:      In the setting of a positive respiratory panel with a viral infection PLUS a negative procalcitonin without other underlying concern for bacterial infection, consider observing off antibiotics or discontinuation of antibiotics and continue supportive care. If the respiratory panel is positive for atypical bacterial infection (Bordetella pertussis, Chlamydophila pneumoniae, or Mycoplasma pneumoniae), consider antibiotic de-escalation to target atypical bacterial infection.    Respiratory Panel PCR w/COVID-19(SARS-CoV-2) NORMA/JUAN ALBERTO/EDY/PAD/COR/MAD/ROSALINA In-House, NP Swab in UTM/VTM, 3-4 HR TAT - Swab, Nasopharynx [[id number]]  (Normal) Collected: 09/06/23 0620    Lab Status: Final result Specimen: Swab from Nasopharynx Updated: 09/06/23 0744     ADENOVIRUS, PCR Not Detected     Coronavirus 229E Not Detected     Coronavirus HKU1 Not Detected     Coronavirus NL63 Not Detected     Coronavirus OC43 Not Detected     COVID19 Not Detected     Human Metapneumovirus Not Detected     Human Rhinovirus/Enterovirus Not Detected     Influenza A PCR Not  Detected     Influenza B PCR Not Detected     Parainfluenza Virus 1 Not Detected     Parainfluenza Virus 2 Not Detected     Parainfluenza Virus 3 Not Detected     Parainfluenza Virus 4 Not Detected     RSV, PCR Not Detected     Bordetella pertussis pcr Not Detected     Bordetella parapertussis PCR Not Detected     Chlamydophila pneumoniae PCR Not Detected     Mycoplasma pneumo by PCR Not Detected    Narrative:      In the setting of a positive respiratory panel with a viral infection PLUS a negative procalcitonin without other underlying concern for bacterial infection, consider observing off antibiotics or discontinuation of antibiotics and continue supportive care. If the respiratory panel is positive for atypical bacterial infection (Bordetella pertussis, Chlamydophila pneumoniae, or Mycoplasma pneumoniae), consider antibiotic de-escalation to target atypical bacterial infection.            No radiology results from the last 24 hrs    Results for orders placed during the hospital encounter of 12/06/22    Adult Transthoracic Echo Complete W/ Cont if Necessary Per Protocol    Interpretation Summary    Left ventricular ejection fraction appears to be greater than 70%.    Left ventricular wall thickness is consistent with moderate concentric hypertrophy.    Left ventricular diastolic function is consistent with (grade I) impaired relaxation.    Estimated right ventricular systolic pressure from tricuspid regurgitation is mildly elevated (35-45 mmHg). Calculated right ventricular systolic pressure from tricuspid regurgitation is 39 mmHg.      Current medications:  Scheduled Meds:amLODIPine, 10 mg, Oral, Q24H  amoxicillin-clavulanate, 1 tablet, Oral, Q12H  carvedilol, 12.5 mg, Oral, BID With Meals  famotidine, 20 mg, Oral, QAM  ferrous sulfate, 325 mg, Oral, Daily With Breakfast  hydrALAZINE, 50 mg, Oral, Q8H  insulin detemir, 5 Units, Subcutaneous, Daily  insulin lispro, 2-7 Units, Subcutaneous, 4x Daily AC & at  Bedtime  lactobacillus acidophilus, 1 capsule, Oral, Daily  Lidocaine, 1 patch, Transdermal, Q24H  losartan, 50 mg, Oral, Q24H  mirtazapine, 7.5 mg, Oral, Nightly  multivitamin, 1 tablet, Oral, Daily  rivaroxaban, 15 mg, Oral, Daily With Dinner  sodium chloride, 10 mL, Intravenous, Q12H  terazosin, 1 mg, Oral, Nightly  vancomycin, 125 mg, Oral, BID      Continuous Infusions:   PRN Meds:.  acetaminophen    dextrose    dextrose    glucagon (human recombinant)    hydrALAZINE    melatonin    ondansetron **OR** ondansetron    oxyCODONE-acetaminophen    Potassium Replacement - Follow Nurse / BPA Driven Protocol    prochlorperazine **OR** prochlorperazine **OR** prochlorperazine    [COMPLETED] Insert Peripheral IV **AND** sodium chloride    sodium chloride    sodium chloride    Assessment & Plan   Assessment & Plan     Active Hospital Problems    Diagnosis  POA    **Hypertensive urgency [I16.0]  Yes    Acute UTI (urinary tract infection) [N39.0]  Yes    Nausea vomiting and diarrhea [R11.2, R19.7]  Yes    PAF (paroxysmal atrial fibrillation) [I48.0]  Yes    Anxiety associated with depression [F41.8]  Yes    Clavicle fracture [S42.009A]  Yes    CKD (chronic kidney disease) stage 3, GFR 30-59 ml/min [N18.30]  Yes    Hypokalemia [E87.6]  Yes    Gastroparesis [K31.84]  Yes    Uncontrolled type 1 diabetes mellitus with hyperglycemia [E10.65]  Yes    Hyperlipidemia [E78.5]  Yes    Hypertension [I10]  Yes      Resolved Hospital Problems   No resolved problems to display.        Brief Hospital Course to date:  Thelma Michael is a 62 y.o. female w/ a hx of poorly controlled type 1 diabetes, Afib on Xarelto, iron deficiency anemia, gastroparesis s/p gastric stimulator, HTN, HLD, migraines, chronic urinary retention (previously performed self-catheterization), strokes (chronic right lentiform nucleus and left cerebellar lacunar infarcts found on MRI in February of 2023), tobacco abuse and GERD who presented to the emergency room  with complaints of nausea, vomiting and diarrhea. Patient has had issues with fevers since the evening of 9/8, repeat blood cultures have been NGTD and no obvious new symptoms. ID consulted, repeat blood cultures from 09/09 NGTD. ID recommending zosyn, transitioned to Augmentin. Oral vancomycin for C diff prophylaxis.     This patient's problems and plans were partially entered by my partner and updated as appropriate by me 09/17/23.   All problems are new to me today     SIRS   CoNS blood cultures contamination   Abdominal pain-resolved  Chronic recurrent Enteroaggregative E.coli positive diarrhea   H/o Cdiff 3/2022, also on 8/11, PCR was positive but toxin negative indicating colonization  -ID following  -Continue Vancomycin 125mg PO Bid for C diff ppx for 2 more weeks (until 9/27)  -Continue Augmentin 875/125 until 9/18     Hypertensive urgency -> resolved   History of hypertension  -status post Cardene gtt. BP much improved  -Continue p.o. hydralazine, Coreg, Norvasc, terazosin, Losartan     Poorly controlled type 1 diabetes A1c 11.1%   -Continue basal and SSI, adjust as warranted      Acute UTI 09/05  Hx of neurogenic bladder (previously self-cath'd)  -Urine Cx 9/5 + E coli, on Augmentin       Hypokalemia-resolved  Continue to monitor and replete per protocol     CKD Stage III  -stable     PAF   -continue coreg, Xarelto      Expected Discharge Location and Transportation: [facility]  Expected Discharge   09/18/2023    DVT prophylaxis:  Medical DVT prophylaxis orders are present.     AM-PAC 6 Clicks Score (PT): 12 (09/15/23 0141)    CODE STATUS:   Code Status and Medical Interventions:   Ordered at: 09/06/23 0529     Code Status (Patient has no pulse and is not breathing):    CPR (Attempt to Resuscitate)     Medical Interventions (Patient has pulse or is breathing):    Full Support       Delia Triplett DO  09/17/23

## 2023-09-18 VITALS
HEART RATE: 76 BPM | BODY MASS INDEX: 21.28 KG/M2 | DIASTOLIC BLOOD PRESSURE: 75 MMHG | WEIGHT: 140.4 LBS | RESPIRATION RATE: 16 BRPM | TEMPERATURE: 98 F | SYSTOLIC BLOOD PRESSURE: 140 MMHG | OXYGEN SATURATION: 99 % | HEIGHT: 68 IN

## 2023-09-18 PROBLEM — R19.7 NAUSEA VOMITING AND DIARRHEA: Status: RESOLVED | Noted: 2023-09-06 | Resolved: 2023-09-18

## 2023-09-18 PROBLEM — N39.0 ACUTE UTI (URINARY TRACT INFECTION): Status: RESOLVED | Noted: 2023-09-06 | Resolved: 2023-09-18

## 2023-09-18 PROBLEM — I16.0 HYPERTENSIVE URGENCY: Status: RESOLVED | Noted: 2021-05-01 | Resolved: 2023-09-18

## 2023-09-18 PROBLEM — E87.6 HYPOKALEMIA: Status: RESOLVED | Noted: 2023-03-30 | Resolved: 2023-09-18

## 2023-09-18 PROBLEM — R11.2 NAUSEA VOMITING AND DIARRHEA: Status: RESOLVED | Noted: 2023-09-06 | Resolved: 2023-09-18

## 2023-09-18 LAB
GLUCOSE BLDC GLUCOMTR-MCNC: 149 MG/DL (ref 70–130)
GLUCOSE BLDC GLUCOMTR-MCNC: 176 MG/DL (ref 70–130)

## 2023-09-18 PROCEDURE — 97535 SELF CARE MNGMENT TRAINING: CPT

## 2023-09-18 PROCEDURE — 97110 THERAPEUTIC EXERCISES: CPT

## 2023-09-18 PROCEDURE — 63710000001 INSULIN LISPRO (HUMAN) PER 5 UNITS: Performed by: NURSE PRACTITIONER

## 2023-09-18 PROCEDURE — 63710000001 INSULIN DETEMIR PER 5 UNITS: Performed by: INTERNAL MEDICINE

## 2023-09-18 PROCEDURE — 82948 REAGENT STRIP/BLOOD GLUCOSE: CPT

## 2023-09-18 PROCEDURE — 99232 SBSQ HOSP IP/OBS MODERATE 35: CPT | Performed by: FAMILY MEDICINE

## 2023-09-18 RX ORDER — BETHANECHOL CHLORIDE 10 MG/1
10 TABLET ORAL 3 TIMES DAILY
Status: DISCONTINUED | OUTPATIENT
Start: 2023-09-18 | End: 2023-09-18 | Stop reason: HOSPADM

## 2023-09-18 RX ORDER — LIDOCAINE 4 G/G
1 PATCH TOPICAL
Start: 2023-09-19

## 2023-09-18 RX ORDER — OXYCODONE HYDROCHLORIDE AND ACETAMINOPHEN 5; 325 MG/1; MG/1
1 TABLET ORAL EVERY 6 HOURS PRN
Refills: 0
Start: 2023-09-18 | End: 2023-09-20

## 2023-09-18 RX ORDER — BETHANECHOL CHLORIDE 10 MG/1
10 TABLET ORAL 3 TIMES DAILY
Start: 2023-09-18

## 2023-09-18 RX ORDER — CARVEDILOL 12.5 MG/1
12.5 TABLET ORAL 2 TIMES DAILY WITH MEALS
Start: 2023-09-18

## 2023-09-18 RX ORDER — PROCHLORPERAZINE MALEATE 5 MG/1
5 TABLET ORAL EVERY 6 HOURS PRN
Start: 2023-09-18

## 2023-09-18 RX ORDER — FAMOTIDINE 20 MG/1
20 TABLET, FILM COATED ORAL EVERY MORNING
Start: 2023-09-19

## 2023-09-18 RX ORDER — VANCOMYCIN HYDROCHLORIDE 125 MG/1
125 CAPSULE ORAL 2 TIMES DAILY
Qty: 17 CAPSULE | Refills: 0
Start: 2023-09-18 | End: 2023-09-27

## 2023-09-18 RX ORDER — HYDRALAZINE HYDROCHLORIDE 50 MG/1
50 TABLET, FILM COATED ORAL EVERY 8 HOURS SCHEDULED
Start: 2023-09-18

## 2023-09-18 RX ADMIN — INSULIN DETEMIR 5 UNITS: 100 INJECTION, SOLUTION SUBCUTANEOUS at 08:33

## 2023-09-18 RX ADMIN — INSULIN LISPRO 2 UNITS: 100 INJECTION, SOLUTION INTRAVENOUS; SUBCUTANEOUS at 08:33

## 2023-09-18 RX ADMIN — CARVEDILOL 12.5 MG: 12.5 TABLET, FILM COATED ORAL at 08:34

## 2023-09-18 RX ADMIN — VANCOMYCIN HYDROCHLORIDE 125 MG: 125 CAPSULE ORAL at 08:34

## 2023-09-18 RX ADMIN — MULTIVITAMIN TABLET 1 TABLET: TABLET at 08:34

## 2023-09-18 RX ADMIN — Medication 1 CAPSULE: at 08:33

## 2023-09-18 RX ADMIN — FAMOTIDINE 20 MG: 20 TABLET ORAL at 06:16

## 2023-09-18 RX ADMIN — OXYCODONE HYDROCHLORIDE AND ACETAMINOPHEN 1 TABLET: 5; 325 TABLET ORAL at 03:48

## 2023-09-18 RX ADMIN — LIDOCAINE 1 PATCH: 560 PATCH PERCUTANEOUS; TOPICAL; TRANSDERMAL at 08:33

## 2023-09-18 RX ADMIN — AMLODIPINE BESYLATE 10 MG: 10 TABLET ORAL at 08:33

## 2023-09-18 RX ADMIN — LOSARTAN POTASSIUM 50 MG: 50 TABLET, FILM COATED ORAL at 08:34

## 2023-09-18 RX ADMIN — HYDRALAZINE HYDROCHLORIDE 50 MG: 50 TABLET, FILM COATED ORAL at 06:16

## 2023-09-18 RX ADMIN — FERROUS SULFATE TAB 325 MG (65 MG ELEMENTAL FE) 325 MG: 325 (65 FE) TAB at 08:33

## 2023-09-18 RX ADMIN — BETHANECHOL CHLORIDE 10 MG: 10 TABLET ORAL at 12:45

## 2023-09-18 RX ADMIN — OXYCODONE HYDROCHLORIDE AND ACETAMINOPHEN 1 TABLET: 5; 325 TABLET ORAL at 12:45

## 2023-09-18 NOTE — THERAPY TREATMENT NOTE
Patient Name: Thelma Michael  : 1960    MRN: [id number]                              Today's Date: 2023       Admit Date: 2023    Visit Dx:     ICD-10-CM ICD-9-CM   1. Hypertensive urgency  I16.0 401.9   2. Acute kidney injury  N17.9 584.9   3. Hyperglycemia  R73.9 790.29   4. Acute superficial gastritis without hemorrhage  K29.00 535.40     Patient Active Problem List   Diagnosis    Diabetic peripheral neuropathy    Hyperlipidemia    Hypertension    Osteoporosis    Tobacco use    Heart valve disease    Iron deficiency anemia secondary to inadequate dietary iron intake    Acute kidney injury    DKA (diabetic ketoacidoses)    Hypertensive urgency    Uncontrolled type 1 diabetes mellitus with hyperglycemia    Gastroparesis    PFO (patent foramen ovale)    Atrial fibrillation and flutter    Intractable vomiting    Hypokalemia    Refractory nausea and vomiting    HHS vs mild DKA    CKD (chronic kidney disease) stage 3, GFR 30-59 ml/min    Hypertensive urgency    Urinary retention    Family history of transient ischemic attacks    Type 1 diabetes mellitus with hypoglycemia with coma    Acute UTI (urinary tract infection)    Nausea vomiting and diarrhea    PAF (paroxysmal atrial fibrillation)    Anxiety associated with depression    Clavicle fracture     Past Medical History:   Diagnosis Date    Acid reflux     Acute bronchitis     Cardiac murmur     Depression with anxiety 10/5/2020    Diabetes mellitus     Gastroesophageal reflux disease 2016    H/O echocardiogram 2012    i. LVEF 65%.ii. Mild LVH.iii. Borderline evidence of atrial septal aneurysm.  No PFO.     History of nuclear stress test 2014    Negative for ischemia and scars; LVEF 77%.      History of TIAs 7/15/2021    Hyperlipidemia     Hypertension     Impacted cerumen of both ears     Migraine     Migraines 10/31/2019    Self-catheterizes urinary bladder     Sinusitis     Stroke     Tobacco abuse     quit 4 days ago.       Urticaria     Vitamin D deficiency 5/13/2016     Past Surgical History:   Procedure Laterality Date    CAPSULE ENDOSCOPY  07/27/2021    Procedure: PILLCAM DEPLOYMENT;  Surgeon: Mikael Worthy MD;  Location:  [facility];  Service: Gastroenterology;;    COLONOSCOPY      COLONOSCOPY N/A 07/27/2021    Procedure: COLONOSCOPY;  Surgeon: Mikael Worthy MD;  Location:  [facility];  Service: Gastroenterology;  Laterality: N/A;    DENTAL PROCEDURE      ENDOSCOPY N/A 06/30/2021    Procedure: ESOPHAGOGASTRODUODENOSCOPY;  Surgeon: Brunner, Mark I, MD;  Location:  [facility];  Service: Gastroenterology;  Laterality: N/A;    ENDOSCOPY N/A 07/27/2021    Procedure: ESOPHAGOGASTRODUODENOSCOPY;  Surgeon: Mikael Worthy MD;  Location:  [facility];  Service: Gastroenterology;  Laterality: N/A;    ENDOSCOPY WITH JTUBE N/A 03/16/2022    Procedure: ESOPHAGOGASTRODUODENOSCOPY WITH JEJUNAL TUBE INSERTION;  Surgeon: Thom Glover MD;  Location:  [facility];  Service: Gastroenterology;  Laterality: N/A;    UPPER GASTROINTESTINAL ENDOSCOPY        General Information       Row Name 09/18/23 1207          OT Time and Intention    Document Type therapy note (daily note)  -PITER     Mode of Treatment individual therapy;occupational therapy  -PITER       Row Name 09/18/23 1207          General Information    Patient Profile Reviewed yes  -PITER     Existing Precautions/Restrictions fall;other (see comments)  L clavicle dx, LUE sling in room for OOB activity, NWB LUE, contact/spore  -PITER     Barriers to Rehab medically complex  -PITER       Row Name 09/18/23 1207          Cognition    Orientation Status (Cognition) oriented x 3  -PITER       Row Name 09/18/23 1207          Safety Issues, Functional Mobility    Safety Issues Affecting Function (Mobility) safety precaution awareness;safety precautions follow-through/compliance;awareness of need for assistance;insight into deficits/self-awareness;judgment;problem-solving   -PITER     Impairments Affecting Function (Mobility) balance;coordination;endurance/activity tolerance;strength;range of motion (ROM);pain;postural/trunk control;motor planning  -               User Key  (r) = Recorded By, (t) = Taken By, (c) = Cosigned By      Initials Name Provider Type    Aide Fuentes, OT Occupational Therapist                     Mobility/ADL's       Row Name 09/18/23 1208          Bed Mobility    Supine-Sit [place] (Bed Mobility) supervision  -     Bed Mobility, Safety Issues decreased use of arms for pushing/pulling  -     Assistive Device (Bed Mobility) bed rails;head of bed elevated  -PITER     Comment, (Bed Mobility) pt. able to use grab bar with RUE and bring self to EOB  -       Row Name 09/18/23 1208          Transfers    Transfers sit-stand transfer;stand-sit transfer;toilet transfer  -       Row Name 09/18/23 1208          Sit-Stand Transfer    Sit-Stand [place] (Transfers) minimum assist (75% patient effort)  -     Assistive Device (Sit-Stand Transfers) other (see comments)  -PITER     Comment, (Sit-Stand Transfer) RUE support  -       Row Name 09/18/23 1208          Stand-Sit Transfer    Stand-Sit [place] (Transfers) minimum assist (75% patient effort)  -     Assistive Device (Stand-Sit Transfers) other (see comments)  -PITER     Comment, (Stand-Sit Transfer) RUE support  -       Row Name 09/18/23 1208          Toilet Transfer    Type (Toilet Transfer) sit-stand;stand-sit  -PITER     [place] Level (Toilet Transfer) moderate assist (50% patient effort);verbal cues  -     Assistive Device (Toilet Transfer) commode;grab bars/safety frame  -PITER     Comment, (Toilet Transfer) with lower surface pt. needed mod A, recommend BSC over toilet, cues needed for hand placement  -       Row Name 09/18/23 1208          Functional Mobility    Functional Mobility- Ind. Level moderate assist (50% patient effort)  -     Functional Mobility- Device other (see comments)   RUE support  -     Functional Mobility-Distance (Feet) --  16 + 4 + 18  -     Functional Mobility- Safety Issues balance decreased during turns;step length decreased  -     Functional Mobility- Comment pt. needed intermittent min to mod A to maintain balance/ RUE support  -       Row Name 09/18/23 1208          Activities of Daily Living    BADL Assessment/Intervention lower body dressing;grooming;toileting  -       Row Name 09/18/23 1208          Lower Body Dressing Assessment/Training    [place] Level (Lower Body Dressing) doff;socks;moderate assist (50% patient effort)  -PITER     Position (Lower Body Dressing) edge of bed sitting  -       Row Name 09/18/23 1208          Upper Body Dressing Assessment/Training    [place] Level (Upper Body Dressing) don;maximum assist (25% patient effort);other (see comments);doff;pajama/robe  -PITER     Position (Upper Body Dressing) edge of bed sitting;supported sitting  -PITER     Comment, (Upper Body Dressing) LUE sling and soiled gown  -       Row Name 09/18/23 1208          Self-Feeding Assessment/Training    [place] Level (Feeding) liquids to mouth;independent  -PITER     Position (Self-Feeding) supported sitting  -PITER       Row Name 09/18/23 1208          Grooming Assessment/Training    [place] Level (Grooming) wash face, hands;moderate assist (50% patient effort)  -PITER     Position (Grooming) unsupported standing;supported standing  -PITER     Comment, (Grooming) min A for balance standing sinkside pt. washed face and OT assisted with R hand on toilet post toileting  -       Row Name 09/18/23 1208          Toileting Assessment/Training    [place] Level (Toileting) minimum assist (75% patient effort);perform perineal hygiene  -PITER     Position (Toileting) supported sitting  -PITER     Comment, (Toileting) when handed wipes pt. was able to wipe buttocks, instanding pt. stood holding to grab bar why OT wiped over to ensure cleanliness  -PITER                User Key  (r) = Recorded By, (t) = Taken By, (c) = Cosigned By      Initials Name Provider Type    PITER Aide Henry, OT Occupational Therapist                   Obj/Interventions       Row Name 09/18/23 1214          Shoulder (Therapeutic Exercise)    Shoulder (Therapeutic Exercise) AAROM (active assistive range of motion)  -     Shoulder AAROM (Therapeutic Exercise) right;flexion;extension;aBduction;aDduction;horizontal aBduction/aDduction;sitting;10 repetitions  -       Row Name 09/18/23 1214          Elbow/Forearm (Therapeutic Exercise)    Elbow/Forearm (Therapeutic Exercise) AROM (active range of motion)  -     Elbow/Forearm AROM (Therapeutic Exercise) left;flexion;extension;pronation;10 repetitions;sitting  with OT kept LUE stable to prevent shoulder/scapular mvmt with distal ROM  -       Row Name 09/18/23 1214          Wrist (Therapeutic Exercise)    Wrist (Therapeutic Exercise) AROM (active range of motion)  -     Wrist AROM (Therapeutic Exercise) left;flexion;extension;10 repetitions  -[facility] Name 09/18/23 1214          Hand (Therapeutic Exercise)    Hand (Therapeutic Exercise) AROM (active range of motion)  -     Hand AROM/AAROM (Therapeutic Exercise) left;finger flexion;finger extension;10 repetitions  -       Row Name 09/18/23 1214          Motor Skills    Therapeutic Exercise shoulder;wrist;elbow/forearm;hand  -       Row Name 09/18/23 [facility]          Balance    Static Sitting Balance supervision  -     Dynamic Sitting Balance contact guard  -     Position, Sitting Balance unsupported;sitting edge of bed  -     Static Standing Balance minimal assist  -     Dynamic Standing Balance moderate assist  -     Position/Device Used, Standing Balance supported;other (see comments)  RUE support, grab bar use  -     Balance Interventions sit to stand;occupation based/functional task  -     Comment, Balance toileting, grooming, LBD, UE TE  -               User Key  (r) =  Recorded By, (t) = Taken By, (c) = Cosigned By      Initials Name Provider Type    Aide Fuentes, OT Occupational Therapist                   Goals/Plan       Row Name 09/18/23 1221          Bed Mobility Goal 1 (OT)    Progress/Outcomes (Bed Mobility Goal 1, OT) goal partially met;goal ongoing  -PITER       Row Name 09/18/23 1221          Transfer Goal 1 (OT)    Progress/Outcome (Transfer Goal 1, OT) goal partially met;goal ongoing  -PITER       Row Name 09/18/23 1221          Toileting Goal 1 (OT)    Progress/Outcome (Toileting Goal 1, OT) goal partially met;goal ongoing  -PITER       Row Name 09/18/23 1221          Grooming Goal 1 (OT)    Progress/Outcome (Grooming Goal 1, OT) continuing progress toward goal;goal ongoing  -PITER               User Key  (r) = Recorded By, (t) = Taken By, (c) = Cosigned By      Initials Name Provider Type    Aide Fuentes, OT Occupational Therapist                   Clinical Impression       Row Name 09/18/23 1217          Pain Assessment    Posttreatment Pain Rating 4/10  -PITER     Pain Location - Side/Orientation Left  -PITER     Pain Location - other (see comments)  clavicle area  -PITER     Pre/Posttreatment Pain Comment sling donned prior to OOB activity, per pt. no pain meds needed, sling left on with support on pillow  -PITER       Row Name 09/18/23 1217          Plan of Care Review    Plan of Care Reviewed With patient  -PITER     Progress improving  -PITER     Outcome Evaluation Pt. with some improvement in balance only needing one person support today and progressed with mvmt to bathroom.  Pt. was able to engage in UE ROM and grooming at sinkside and toileting on standard toilet.  Recommend BSC over toilet to assist with transfers.  Pt. with R shoulder weakness needing AAROM for mvmt. Pt. remains much below baseline warranting continued skilled OT services.  -PITER       Row Name 09/18/23 1217          Therapy Plan Review/Discharge Plan (OT)    Anticipated Discharge Disposition (OT) inpatient  rehabilitation facility  -PITER       Row Name 09/18/23 1217          Vital Signs    Pre Systolic BP Rehab 131  -PITER     Pre Treatment Diastolic BP 63  -PITER     Post Systolic BP Rehab 140  -PITER     Post Treatment Diastolic BP 75  -PITER     Pretreatment Heart Rate (beats/min) 72  -PITER     Posttreatment Heart Rate (beats/min) 78  -PITER     O2 Delivery Pre Treatment room air  -PITER     O2 Delivery Intra Treatment room air  -PITER     Post SpO2 (%) 95  -PITER     O2 Delivery Post Treatment room air  -PITER     Pre Patient Position Supine  -PITER     Intra Patient Position Standing  -PITER     Post Patient Position Sitting  -PITER       Row Name 09/18/23 1217          Positioning and Restraints    Pre-Treatment Position in bed  -PITER     Post Treatment Position chair  -PITER     In Chair notified nsg;reclined;call light within reach;encouraged to call for assist;exit alarm on;waffle cushion;legs elevated  -PITER               User Key  (r) = Recorded By, (t) = Taken By, (c) = Cosigned By      Initials Name Provider Type    Aide Fuentes, OT Occupational Therapist                   Outcome Measures       Row Name 09/18/23 1222          How much help from another is currently needed...    Putting on and taking off regular lower body clothing? 2  -PITER     Bathing (including washing, rinsing, and drying) 2  -PITER     Toileting (which includes using toilet bed pan or urinal) 2  -PITER     Putting on and taking off regular upper body clothing 2  -PITER     Taking care of personal grooming (such as brushing teeth) 2  -PIETR     Eating meals 3  -PITER     AM-PAC 6 Clicks Score (OT) 13  -PITER       Row Name 09/18/23 0800          How much help from another person do you currently need...    Turning from your back to your side while in flat bed without using bedrails? 3  -KB     Moving from lying on back to sitting on the side of a flat bed without bedrails? 2  -KB     Moving to and from a bed to a chair (including a wheelchair)? 2  -KB     Standing up from a chair using your arms  (e.g., wheelchair, bedside chair)? 1  -KB     Climbing 3-5 steps with a railing? 2  -KB     To walk in hospital room? 2  -KB     AM-PAC 6 Clicks Score (PT) 12  -KB     Highest level of mobility 4 --> Transferred to chair/commode  -KB       Row Name 09/18/23 1222          Functional Assessment    Outcome Measure Options AM-PAC 6 Clicks Daily Activity (OT)  -PITER               User Key  (r) = Recorded By, (t) = Taken By, (c) = Cosigned By      Initials Name Provider Type    Aide Fuentes, OT Occupational Therapist    Adarsh Feliciano, RN Registered Nurse                    Occupational Therapy Education       Title: PT OT SLP Therapies (In Progress)       Topic: Occupational Therapy (In Progress)       Point: ADL training (In Progress)       Description:   Instruct learner(s) on proper safety adaptation and remediation techniques during self care or transfers.   Instruct in proper use of assistive devices.                  Learning Progress Summary             Patient Acceptance, E,D, NR by  at 9/18/2023 1222    Comment: transfer technique, BUE TE, sling donning, ADL progression with safety precautions, UE TE within precautions    Acceptance, E, NR by  at 9/13/2023 1358    Acceptance, E, NR by  at 9/12/2023 1419                         Point: Home exercise program (In Progress)       Description:   Instruct learner(s) on appropriate technique for monitoring, assisting and/or progressing therapeutic exercises/activities.                  Learning Progress Summary             Patient Acceptance, E,D, NR by  at 9/18/2023 1222    Comment: transfer technique, BUE TE, sling donning, ADL progression with safety precautions, UE TE within precautions                         Point: Precautions (In Progress)       Description:   Instruct learner(s) on prescribed precautions during self-care and functional transfers.                  Learning Progress Summary             Patient Acceptance, E,D, NR by  at 9/18/2023  1222    Comment: transfer technique, BUE TE, sling donning, ADL progression with safety precautions, UE TE within precautions    Acceptance, E, NR by  at 9/13/2023 1358    Acceptance, E, NR by  at 9/12/2023 1419                         Point: Body mechanics (In Progress)       Description:   Instruct learner(s) on proper positioning and spine alignment during self-care, functional mobility activities and/or exercises.                  Learning Progress Summary             Patient Acceptance, E, NR by  at 9/13/2023 1358    Acceptance, E, NR by  at 9/12/2023 1419                                         User Key       Initials Effective Dates Name Provider Type [facility] 07/11/23 -  Aide Henry, OT Occupational Therapist OT     10/14/22 -  Julieta Mayfield OT Occupational Therapist OT                  OT Recommendation and Plan     Plan of Care Review  Plan of Care Reviewed With: patient  Progress: improving  Outcome Evaluation: Pt. with some improvement in balance only needing one person support today and progressed with mvmt to bathroom.  Pt. was able to engage in UE ROM and grooming at sinkside and toileting on standard toilet.  Recommend BSC over toilet to assist with transfers.  Pt. with R shoulder weakness needing AAROM for mvmt. Pt. remains much below baseline warranting continued skilled OT services.     Time Calculation:         Time Calculation- OT       Row Name 09/18/23 1223             Time Calculation- OT    OT Start Time 1117  -PITER      OT Received On 09/18/23  -      OT Goal Re-Cert Due Date 09/22/23  -         Timed Charges    84505 - OT Therapeutic Exercise Minutes 8  -PITER      43377 - OT Therapeutic Activity Minutes 8  -PITER      88888 - OT Self Care/Mgmt Minutes 24  -PITER         Total Minutes    Timed Charges Total Minutes 40  -PITER       Total Minutes 40  -PITER                User Key  (r) = Recorded By, (t) = Taken By, (c) = Cosigned By      Initials Name Provider Type      Aide Henry, OT Occupational Therapist                  Therapy Charges for Today       Code Description Service Date Service Provider Modifiers Qty    67686372248 HC OT THER PROC EA 15 MIN 9/18/2023 Aide Henry, OT GO 1    98555210521 HC OT SELF CARE/MGMT/TRAIN EA 15 MIN 9/18/2023 Aide Henry, OT GO 2                 Aide Henry, OT  9/18/2023

## 2023-09-18 NOTE — PAYOR COMM NOTE
"Alisa Michael (62 y.o. Female)     [id number]     Cassandra Magdaleno, SUKH  Utilization Review  [phone]  [phone]        Date of Birth   1960    Social Security Number       Address   [address] [place] [address]    Home Phone   [phone]    MRN   [id number]       Gnosticist   Adventism    Marital Status                               Admission Date   9/5/23    Admission Type   Emergency    Admitting Provider   Delia Triplett DO    Attending Provider   Delia Triplett DO    Department, Room/Bed   [facility] 6A, N606/1       Discharge Date       Discharge Disposition   Skilled Nursing Facility (DC - External)    Discharge Destination                                 Attending Provider: Delia Triplett DO    Allergies: No Known Allergies    Isolation: Spore   Infection: C.difficile (08/11/23)   Code Status: CPR    Ht: 172.7 cm (68\")   Wt: 63.7 kg (140 lb 6.4 oz)    Admission Cmt: None   Principal Problem: Hypertensive urgency [I16.0]                   Active Insurance as of 9/5/2023       Primary Coverage       Payor Plan Insurance Group Employer/Plan Group    [employer] [place] [employer] [place] [id number]       Payor Plan Address Payor Plan Phone Number Payor Plan Fax Number Effective Dates    [address] [address] [phone]  4/2/2020 - None Entered    [facility] [id number]         Subscriber Name Subscriber Birth Date Member ID       ALISA MICHAEL 1960 [id number]                     Emergency Contacts        (Rel.) Home Phone Work Phone Mobile Phone    PENNVINAYAK UREÑA (Son) [phone] -- [phone]    EVELINE CANTRELL (Daughter) [phone] -- [phone]    AMANDA MICHAEL (Daughter) [phone] -- [phone]    SLOAN CANTRELL (Friend) [phone] -- [phone]                 Discharge Summary        Delia Triplett DO at 09/18/23 1256              [facility] Medicine Services  DISCHARGE " SUMMARY    Patient Name: Thelma Michael  : 1960  MRN: [id number]    Date of Admission: 2023  9:08 PM  Date of Discharge:  2023  Primary Care Physician: Monet Howe APRN    Consults       Date and Time Order Name Status Description    2023  7:23 AM Inpatient Infectious Diseases Consult Completed             Hospital Course       Active Hospital Problems    Diagnosis  POA    PAF (paroxysmal atrial fibrillation) [I48.0]  Yes    Anxiety associated with depression [F41.8]  Yes    Clavicle fracture [S42.009A]  Yes    CKD (chronic kidney disease) stage 3, GFR 30-59 ml/min [N18.30]  Yes    Gastroparesis [K31.84]  Yes    Uncontrolled type 1 diabetes mellitus with hyperglycemia [E10.65]  Yes    Hyperlipidemia [E78.5]  Yes    Hypertension [I10]  Yes      Resolved Hospital Problems    Diagnosis Date Resolved POA    **Hypertensive urgency [I16.0] 2023 Yes    Acute UTI (urinary tract infection) [N39.0] 2023 Yes    Nausea vomiting and diarrhea [R11.2, R19.7] 2023 Yes    Hypokalemia [E87.6] 2023 Yes          Hospital Course:  Thelma Michael is a 62 y.o. female  w/ a hx of poorly controlled type 1 diabetes, Afib on Xarelto, iron deficiency anemia, gastroparesis s/p gastric stimulator, HTN, HLD, migraines, chronic urinary retention (previously performed self-catheterization), strokes (chronic right lentiform nucleus and left cerebellar lacunar infarcts found on MRI in 2023), tobacco abuse and GERD who presented to the emergency room with complaints of nausea, vomiting and diarrhea. Patient has had issues with fevers since the evening of , repeat blood cultures have been NGTD and no obvious new symptoms. ID consulted, repeat blood cultures from  NGTD. ID recommending zosyn, transitioned to Augmentin. Oral vancomycin for C diff prophylaxis.      SIRS   CoNS blood cultures contamination   Abdominal pain-resolved  Chronic recurrent  Enteroaggregative E.coli positive diarrhea   H/o Cdiff 3/2022, also on 8/11, PCR was positive but toxin negative indicating colonization  -ID followed  -Continue Vancomycin 125mg PO Bid for C diff ppx for 2 more weeks (until 9/27)  -Completed Augmentin      Hypertensive urgency -> resolved   History of hypertension  -status post Cardene gtt. BP much improved  -Continue p.o. hydralazine, Coreg, Norvasc, terazosin, Losartan     Poorly controlled type 1 diabetes A1c 11.1%   -Continue basal and SSI, adjust as warranted      Acute UTI 09/05  Hx of neurogenic bladder (previously self-cath'd)  -Urine Cx 9/5 + E coli, s/p Augmentin   -Add Urecholine  -I/O cath PRN, if bladder scan >500   -Ambulate      Hypokalemia-resolved  Continue to monitor and replete per protocol     CKD Stage III  -stable     PAF   -continue coreg, Xarelto     L distal Clavicle Fx  -Diagnosed in ED 9/1  -Follow-up with Dr Malagon 1-2 weeks   -Pain Control  -PT/OT  -Sling for comfort   -NWB LUE until Ortho follow-up    Discharge Follow Up Recommendations for outpatient labs/diagnostics:  -PCP 1 week  -Dr Morrow 1-2 weeks     Day of Discharge     HPI:   Patient seen and examined. Required I/O cath overnight. Has Hx of self-cathing at home. Asks about rehab will work at [facility].     Vital Signs:   Temp:  [97.5 °F (36.4 °C)-100.3 °F (37.9 °C)] 98 °F (36.7 °C)  Heart Rate:  [74-81] 76  Resp:  [16] 16  BP: (114-158)/(53-77) 140/75      Physical Exam:  Constitutional: No acute distress, awake, alert, frail appearing   HENT: NCAT, mucous membranes moist  Respiratory: Clear to auscultation bilaterally, respiratory effort normal   Cardiovascular: RRR, no murmurs, rubs, or gallops  Gastrointestinal: Positive bowel sounds, soft, nontender, nondistended  Musculoskeletal: No bilateral ankle edema  Psychiatric: Appropriate affect, cooperative  Neurologic: No focal deficits, speech clear  Skin: No rashes     Pertinent  and/or Most Recent Results     LAB RESULTS:       Lab 09/17/23 0327 09/14/23 0300 09/13/23 0305 09/12/23  0349 09/11/23  1602   WBC 8.52 8.35 8.80 10.14  --    HEMOGLOBIN 9.4* 9.9* 9.9* 9.8*  --    HEMATOCRIT 29.2* 30.4* 30.0* 30.0*  --    PLATELETS 530* 530* 470* 426  --    NEUTROS ABS 5.37 5.32 6.22 7.43*  --    IMMATURE GRANS (ABS) 0.15* 0.15* 0.12* 0.08*  --    LYMPHS ABS 2.16 1.96 1.61 1.53  --    MONOS ABS 0.69 0.78 0.69 0.91*  --    EOS ABS 0.12 0.12 0.12 0.17  --    MCV 91.5 89.4 89.6 89.8  --    PROCALCITONIN  --   --   --   --  0.15   LACTATE  --   --   --   --  1.5         Lab 09/17/23 0328 09/14/23 0300 09/13/23 0305 09/12/23  0349   SODIUM 141 141 143 141   POTASSIUM 4.3 4.0 4.1 3.8   CHLORIDE 109* 106 110* 109*   CO2 24.0 24.0 18.0* 23.0   ANION GAP 8.0 11.0 15.0 9.0   BUN 17 20 15 14   CREATININE 1.11* 1.18* 1.11* 1.21*   EGFR 55.6* 51.7* 55.6* 50.2*   GLUCOSE 234* 160* 142* 58*   CALCIUM 8.7 8.2* 8.5* 8.4*   MAGNESIUM  --   --   --  2.1         Lab 09/14/23 0300 09/13/23 0305 09/12/23  0349   TOTAL PROTEIN 6.0 6.1 5.9*   ALBUMIN 2.9* 2.8* 2.8*   GLOBULIN 3.1 3.3 3.1   ALT (SGPT) <5 5 5   AST (SGOT) 12 9 9   BILIRUBIN 0.2 0.2 0.2   ALK PHOS 65 66 69                     Brief Urine Lab Results  (Last result in the past 365 days)        Color   Clarity   Blood   Leuk Est   Nitrite   Protein   CREAT   Urine HCG        09/11/23 1536 Yellow   Turbid   Large (3+)   Large (3+)   Negative   100 mg/dL (2+)                 Microbiology Results (last 10 days)       Procedure Component Value - Date/Time    Urine Culture - Urine, Urine, Random Void [[id number]]  (Normal) Collected: 09/11/23 1536    Lab Status: Final result Specimen: Urine, Random Void Updated: 09/13/23 0747     Urine Culture No growth    Blood Culture - Blood, Hand, Right [[id number]]  (Normal) Collected: 09/09/23 1751    Lab Status: Final result Specimen: Blood from Hand, Right Updated: 09/14/23 1845     Blood Culture No growth at 5 days    Blood Culture - Blood, Hand, Left [[id number]]   (Normal) Collected: 09/09/23 1747    Lab Status: Final result Specimen: Blood from Hand, Left Updated: 09/14/23 1845     Blood Culture No growth at 5 days    Respiratory Panel PCR w/COVID-19(SARS-CoV-2) NORMA/[facility]/EDY/PAD/COR/MAD/ROSALINA In-House, NP Swab in UTM/VTM, 3-4 HR TAT - Swab, Nasopharynx [[id number]]  (Normal) Collected: 09/08/23 1714    Lab Status: Final result Specimen: Swab from Nasopharynx Updated: 09/08/23 1818     ADENOVIRUS, PCR Not Detected     Coronavirus 229E Not Detected     Coronavirus HKU1 Not Detected     Coronavirus NL63 Not Detected     Coronavirus OC43 Not Detected     COVID19 Not Detected     Human Metapneumovirus Not Detected     Human Rhinovirus/Enterovirus Not Detected     Influenza A PCR Not Detected     Influenza B PCR Not Detected     Parainfluenza Virus 1 Not Detected     Parainfluenza Virus 2 Not Detected     Parainfluenza Virus 3 Not Detected     Parainfluenza Virus 4 Not Detected     RSV, PCR Not Detected     Bordetella pertussis pcr Not Detected     Bordetella parapertussis PCR Not Detected     Chlamydophila pneumoniae PCR Not Detected     Mycoplasma pneumo by PCR Not Detected    Narrative:      In the setting of a positive respiratory panel with a viral infection PLUS a negative procalcitonin without other underlying concern for bacterial infection, consider observing off antibiotics or discontinuation of antibiotics and continue supportive care. If the respiratory panel is positive for atypical bacterial infection (Bordetella pertussis, Chlamydophila pneumoniae, or Mycoplasma pneumoniae), consider antibiotic de-escalation to target atypical bacterial infection.            CT Chest Without Contrast Diagnostic    Result Date: 9/12/2023  CT CHEST WO CONTRAST DIAGNOSTIC Date of Exam: 9/12/2023 10:05 AM EDT Indication: Pneumonia, complication suspected, xray done Follow up possible PNA on CT abd/pelvis. Comparison: CT abdomen of 9/11/2023. Prior CT chest is dated February 4, 2023  FINDINGS: There is worsening fairly extensive infiltrate in the right lower lobe with multifocal patchy areas of consolidation. There is infiltrate in the posterior segment of the lateral right middle lobe with patchy area of consolidation, not included on the CT abdomen of 9/11/2023 and new as compared to the prior CT chest. There is some slight scar in the left lung base. There are no significant infiltrates on the left.. Cystic lesion of the right thyroid is stable since the prior CT chest. There are  no enlarged mediastinal nodes. There are no pleural effusions. Technique: Axial CT images were obtained of the chest without contrast administration.  Reconstructed coronal and sagittal images were also obtained. Automated exposure control and iterative construction methods were used.     Impression: Right middle and right lower lobe pneumonia. Electronically Signed: Kerri Peters MD  9/12/2023 11:17 AM EDT  Workstation ID: [id number]    CT Abdomen Pelvis With Contrast    Result Date: 9/11/2023  CT ABDOMEN PELVIS W CONTRAST Date of Exam: 9/11/2023 10:45 AM EDT Indication: Abdominal pain, diarrhea. Comparison: 9/6/2023 Technique: Axial CT images were obtained of the abdomen and pelvis following the uneventful intravenous administration of 75 mL Isovue-300. Reconstructed coronal and sagittal images were also obtained. Automated exposure control and iterative construction methods were used. Findings: Lung Bases:  There is interval development of focal peripheral nodular airspace disease in the right lower lobe compatible with pneumonia versus nodular atelectasis. Again noticed a gastric stimulator device. Liver:Liver is normal in size and CT density. No focal lesions. Biliary/Gallbladder: The gallbladder is without evidence of radiopaque stones. The biliary tree is nondilated. Spleen:Spleen is normal in size and CT density. Pancreas: Pancreas shows homogeneous density. There is no evidence of pancreatic mass or  peripancreatic fluid. Kidneys: Kidneys are normal in size. There are no stones or hydronephrosis. Few tiny left renal cysts Adrenals: Stable diffuse thickening of the adrenal glands compatible with adrenal hyperplasia Retroperitoneal/Lymph Nodes/Vasculature: No retroperitoneal adenopathy is identified by size criteria. Gastrointestinal/Mesentery: The bowel loops are non-dilated without definite wall thickening or mass. The appendix appears within normal limits. No evidence of obstruction. No free air. Air-fluid levels throughout the large bowel with no associated wall thickening compatible with diarrhea Bladder: The bladder is better distended than on the previous examination with mild persistent thickening of the roof of the bladder wall which may represent chronic cystitis. There are no intraluminal calcifications   Bony Structures:  Visualized bony structures are consistent with the patient's age.     Impression: 1. Nondilated large bowel with air-fluid levels and no wall thickening in keeping with the patient's history of diarrhea 2. Interval development of a peripheral nodular density in the right lower lobe may represent nodular atelectasis or pneumonia. Clinical relation and close follow-up recommended with CT chest 3. Additional stable nonemergent findings as above Electronically Signed: Quinton Juarez MD  9/11/2023 12:12 PM EDT  Workstation ID: [id number]     Results for orders placed during the hospital encounter of 04/30/23    Duplex Renal Artery - Bilateral Complete CAR    Interpretation Summary  DUPLEX RENAL ARTERY BILATERAL COMPLETE CAR    Date of Exam: 5/5/2023 9:25 AM EDT    Indication: essential hypertension.    Comparison: No comparisons available.    Technique: Grayscale, color-flow, Doppler spectral waveform analysis was performed of the kidneys, renal arteries, and aorta.      Findings:  Kidneys are normal in size, 11.4 cm in length on the right and 10.1 cm in length on the left. Renal arteries  are generally well visualized. Maximal peak systolic velocity of the right renal artery in the upper range of normal at 181 cm/s at the right  renal origin. Maximal peak systolic velocities in the left renal artery are within normal limits, up to 160 cm/s in the left renal origin. Renal aortic ratios are normal, 2.0 on the right and 1.4 on the left. Resistive indices are within within normal  limits except for mildly elevated mid right renal artery resistive index of 0.79, as an isolated finding, only questionable for underlying renal parenchymal disease.    ,    Impression  Impression:  Negative bilateral duplex renal artery ultrasound. No evidence of hemodynamically significant renal artery stenosis.        Electronically Signed: Krishna Scott  5/5/2023 5:37 PM EDT  Workstation ID: [id number]      Results for orders placed during the hospital encounter of 04/30/23    Duplex Renal Artery - Bilateral Complete CAR    Interpretation Summary  DUPLEX RENAL ARTERY BILATERAL COMPLETE CAR    Date of Exam: 5/5/2023 9:25 AM EDT    Indication: essential hypertension.    Comparison: No comparisons available.    Technique: Grayscale, color-flow, Doppler spectral waveform analysis was performed of the kidneys, renal arteries, and aorta.      Findings:  Kidneys are normal in size, 11.4 cm in length on the right and 10.1 cm in length on the left. Renal arteries are generally well visualized. Maximal peak systolic velocity of the right renal artery in the upper range of normal at 181 cm/s at the right  renal origin. Maximal peak systolic velocities in the left renal artery are within normal limits, up to 160 cm/s in the left renal origin. Renal aortic ratios are normal, 2.0 on the right and 1.4 on the left. Resistive indices are within within normal  limits except for mildly elevated mid right renal artery resistive index of 0.79, as an isolated finding, only questionable for underlying renal parenchymal  disease.    ,    Impression  Impression:  Negative bilateral duplex renal artery ultrasound. No evidence of hemodynamically significant renal artery stenosis.        Electronically Signed: Krishna Garciad  5/5/2023 5:37 PM EDT  Workstation ID: [id number]      Results for orders placed during the hospital encounter of 12/06/22    Adult Transthoracic Echo Complete W/ Cont if Necessary Per Protocol    Interpretation Summary    Left ventricular ejection fraction appears to be greater than 70%.    Left ventricular wall thickness is consistent with moderate concentric hypertrophy.    Left ventricular diastolic function is consistent with (grade I) impaired relaxation.    Estimated right ventricular systolic pressure from tricuspid regurgitation is mildly elevated (35-45 mmHg). Calculated right ventricular systolic pressure from tricuspid regurgitation is 39 mmHg.      Plan for Follow-up of Pending Labs/Results: Inbox     Discharge Details        Discharge Medications        New Medications        Instructions Start Date   bethanechol 10 MG tablet  Commonly known as: URECHOLINE   10 mg, Oral, 3 Times Daily      famotidine 20 MG tablet  Commonly known as: PEPCID   20 mg, Oral, Every Morning   Start Date: September 19, 2023     hydrALAZINE 50 MG tablet  Commonly known as: APRESOLINE   50 mg, Oral, Every 8 Hours Scheduled      insulin detemir 100 UNIT/ML injection  Commonly known as: LEVEMIR   5 Units, Subcutaneous, Daily   Start Date: September 19, 2023     Lidocaine 4 %   1 patch, Transdermal, Every 24 Hours Scheduled, Remove & Discard patch within 12 hours or as directed by MD   Start Date: September 19, 2023     oxyCODONE-acetaminophen 5-325 MG per tablet  Commonly known as: PERCOCET   1 tablet, Oral, Every 6 Hours PRN      prochlorperazine 5 MG tablet  Commonly known as: COMPAZINE   5 mg, Oral, Every 6 Hours PRN      vancomycin 125 MG capsule  Commonly known as: VANCOCIN   125 mg, Oral, 2 Times Daily             Changes to  Medications        Instructions Start Date   carvedilol 12.5 MG tablet  Commonly known as: COREG  What changed:   medication strength  how much to take   12.5 mg, Oral, 2 Times Daily With Meals      ferrous sulfate 325 (65 Fe) MG tablet  What changed: when to take this   Take with Vitamin C or Juice              Continue These Medications        Instructions Start Date   acetaminophen 325 MG tablet  Commonly known as: TYLENOL   650 mg, Oral, Every 4 Hours PRN      albuterol sulfate  (90 Base) MCG/ACT inhaler  Commonly known as: PROVENTIL HFA;VENTOLIN HFA;PROAIR HFA   2 puffs, Inhalation, Every 4 Hours PRN      amLODIPine 10 MG tablet  Commonly known as: NORVASC   10 mg, Oral, Every 24 Hours Scheduled      calcium carbonate 500 MG chewable tablet  Commonly known as: TUMS   2 tablets, Oral, 3 Times Daily PRN      FreeStyle Lite w/Device kit   1 Device, Does not apply, 3 Times Daily      glucose blood test strip   Check blood sugar 5 times daily as needed.      Insulin Lispro 100 UNIT/ML injection  Commonly known as: humaLOG   2-7 Units, Subcutaneous, 4 Times Daily Before Meals & Nightly      lactobacillus acidophilus capsule capsule   1 capsule, Oral, Daily      losartan 50 MG tablet  Commonly known as: COZAAR   50 mg, Oral, Every 24 Hours Scheduled      melatonin 5 MG tablet tablet   5 mg, Oral, Nightly PRN      mirtazapine 7.5 MG tablet  Commonly known as: REMERON   7.5 mg, Oral, Nightly      multivitamin tablet tablet   1 tablet, Oral, Daily      terazosin 1 MG capsule  Commonly known as: HYTRIN   1 mg, Oral, Nightly      vitamin D 1.25 MG (59962 UT) capsule capsule  Commonly known as: ERGOCALCIFEROL   50,000 Units, Oral, Weekly      Xarelto 15 MG tablet  Generic drug: rivaroxaban   Take 1 tablet by mouth Daily With Dinner             Stop These Medications      gabapentin 100 MG capsule  Commonly known as: NEURONTIN     HYDROcodone-acetaminophen 5-325 MG per tablet  Commonly known as: NORCO     loperamide 2  MG tablet  Commonly known as: Imodium A-D     pantoprazole 40 MG EC tablet  Commonly known as: PROTONIX     sennosides-docusate 8.6-50 MG per tablet  Commonly known as: PERICOLACE     traMADol 50 MG tablet  Commonly known as: ULTRAM              No Known Allergies      Discharge Disposition:  Skilled Nursing Facility (DC - External)    Diet:  Hospital:  Diet Order   Procedures    Diet: Cardiac Diets, Diabetic Diets, Gastrointestinal Diets; Healthy Heart (2-3 Na+); Consistent Carbohydrate; Low Irritant; Texture: Soft to Chew (NDD 3); Soft to Chew: Whole Meat; Fluid Consistency: Thin (IDDSI 0)       Activity:      Restrictions or Other Recommendations:       CODE STATUS:    Code Status and Medical Interventions:   Ordered at: 09/06/23 0529     Code Status (Patient has no pulse and is not breathing):    CPR (Attempt to Resuscitate)     Medical Interventions (Patient has pulse or is breathing):    Full Support       Future Appointments   Date Time Provider Department Center   9/19/2023  1:40 PM [facility]   9/26/2023  2:45 PM Monet Howe APRN [facility]   10/27/2023 12:45 PM Gerson Ochoa MD [facility]       Additional Instructions for the Follow-ups that You Need to Schedule       Discharge Follow-up with PCP   As directed       Currently Documented PCP:    Monet Howe APRN    PCP Phone Number:    [phone]     Follow Up Details: 1 week        Discharge Follow-up with Specified Provider: Dr Malagon; 1 Week   As directed      To: Dr Malagon   Follow Up: 1 Week   Follow Up Details: Clavicle Fx                      Delia Triplett DO  09/18/23      Time Spent on Discharge:  I spent  50  minutes on this discharge activity which included: face-to-face encounter with the patient, reviewing the data in the system, coordination of the care with the nursing staff as well as consultants, documentation, and entering orders.            Electronically  signed by Delia Triplett DO at 09/18/23 4445

## 2023-09-18 NOTE — CASE MANAGEMENT/SOCIAL WORK
Case Management Discharge Note      Final Note: Pt is being discharged to [facility] today. Pt is agreeable to D/C plan. [facility] wheelchair van will transport today at 1430. Pt needs to arrive at the [address] bld[address] (Maternity Entrance) at 1415. I updated pt's RN/Adarsh, by phone. RN call report to [phone]. RN fax D/C summary to [phone]. No other needs voiced or identified.         Selected Continued Care - Admitted Since 9/5/2023       Destination Coordination complete.      Service Provider Selected Services Address Phone Fax Patient Preferred    [facility] [address] [facility] [address] [phone] --       Internal Comment last updated by Julieta Bauman, RN 9/18/2023 1239    CVA 2                         Durable Medical Equipment    No services have been selected for the patient.                Dialysis/Infusion    No services have been selected for the patient.                Home Medical Care    No services have been selected for the patient.                Therapy    No services have been selected for the patient.                Community Resources    No services have been selected for the patient.                Community & DME    No services have been selected for the patient.                    Transportation Services  W/C Van: Other ([facility] wheelchair van at 1430)    Final Discharge Disposition Code: 62 - inpatient rehab facility

## 2023-09-18 NOTE — PROGRESS NOTES
[facility] Medicine Services  PROGRESS NOTE    Patient Name: Thelma Michael  : 1960  MRN: [id number]    Date of Admission: 2023  Primary Care Physician: Monet Howe APRN    Subjective   Subjective     CC:   Follow-up pneumonia    HPI:   Patient seen and examined. Required I/O cath overnight. Has Hx of self-cathing at home. Asks about rehab will work at [facility].     Objective   Objective     Vital Signs:   Temp:  [97.5 °F (36.4 °C)-100.3 °F (37.9 °C)] 98 °F (36.7 °C)  Heart Rate:  [74-81] 78  Resp:  [16] 16  BP: (114-158)/(53-77) 114/53     Physical Exam:  Constitutional: No acute distress, awake, alert, frail appearing   HENT: NCAT, mucous membranes moist  Respiratory: Clear to auscultation bilaterally, respiratory effort normal   Cardiovascular: RRR, no murmurs, rubs, or gallops  Gastrointestinal: Positive bowel sounds, soft, nontender, nondistended  Musculoskeletal: No bilateral ankle edema  Psychiatric: Appropriate affect, cooperative  Neurologic: No focal deficits, speech clear  Skin: No rashes     Results Reviewed:  LAB RESULTS:      Lab 23  0327 23  0300 23  0305 23  0349 23  1602   WBC 8.52 8.35 8.80 10.14  --    HEMOGLOBIN 9.4* 9.9* 9.9* 9.8*  --    HEMATOCRIT 29.2* 30.4* 30.0* 30.0*  --    PLATELETS 530* 530* 470* 426  --    NEUTROS ABS 5.37 5.32 6.22 7.43*  --    IMMATURE GRANS (ABS) 0.15* 0.15* 0.12* 0.08*  --    LYMPHS ABS 2.16 1.96 1.61 1.53  --    MONOS ABS 0.69 0.78 0.69 0.91*  --    EOS ABS 0.12 0.12 0.12 0.17  --    MCV 91.5 89.4 89.6 89.8  --    PROCALCITONIN  --   --   --   --  0.15   LACTATE  --   --   --   --  1.5         Lab 23  0328 23  0300 23  0305 23  0349   SODIUM 141 141 143 141   POTASSIUM 4.3 4.0 4.1 3.8   CHLORIDE 109* 106 110* 109*   CO2 24.0 24.0 18.0* 23.0   ANION GAP 8.0 11.0 15.0 9.0   BUN 17 20 15 14   CREATININE 1.11* 1.18* 1.11* 1.21*   EGFR 55.6* 51.7* 55.6* 50.2*    GLUCOSE 234* 160* 142* 58*   CALCIUM 8.7 8.2* 8.5* 8.4*   MAGNESIUM  --   --   --  2.1         Lab 09/14/23  0300 09/13/23  0305 09/12/23  0349   TOTAL PROTEIN 6.0 6.1 5.9*   ALBUMIN 2.9* 2.8* 2.8*   GLOBULIN 3.1 3.3 3.1   ALT (SGPT) <5 5 5   AST (SGOT) 12 9 9   BILIRUBIN 0.2 0.2 0.2   ALK PHOS 65 66 69                     Brief Urine Lab Results  (Last result in the past 365 days)        Color   Clarity   Blood   Leuk Est   Nitrite   Protein   CREAT   Urine HCG        09/11/23 1536 Yellow   Turbid   Large (3+)   Large (3+)   Negative   100 mg/dL (2+)                   Microbiology Results Abnormal       Procedure Component Value - Date/Time    Blood Culture - Blood, Hand, Right [[id number]]  (Normal) Collected: 09/09/23 1751    Lab Status: Final result Specimen: Blood from Hand, Right Updated: 09/14/23 1845     Blood Culture No growth at 5 days    Blood Culture - Blood, Hand, Left [[id number]]  (Normal) Collected: 09/09/23 1747    Lab Status: Final result Specimen: Blood from Hand, Left Updated: 09/14/23 1845     Blood Culture No growth at 5 days    Urine Culture - Urine, Urine, Random Void [[id number]]  (Normal) Collected: 09/11/23 1536    Lab Status: Final result Specimen: Urine, Random Void Updated: 09/13/23 0747     Urine Culture No growth    Blood Culture - Blood, Hand, Left [[id number]]  (Normal) Collected: 09/07/23 0752    Lab Status: Final result Specimen: Blood from Hand, Left Updated: 09/12/23 0815     Blood Culture No growth at 5 days    Narrative:      Aerobic bottle only      Blood Culture - Blood, Hand, Right [[id number]]  (Normal) Collected: 09/07/23 0750    Lab Status: Final result Specimen: Blood from Hand, Right Updated: 09/12/23 0815     Blood Culture No growth at 5 days    Respiratory Panel PCR w/COVID-19(SARS-CoV-2) NORMA/JUAN ALBERTO/EDY/PAD/COR/MAD/ROSALINA In-House, NP Swab in UTM/VTM, 3-4 HR TAT - Swab, Nasopharynx [[id number]]  (Normal) Collected: 09/08/23 6295    Lab Status: Final result Specimen: Swab from  Nasopharynx Updated: 09/08/23 1818     ADENOVIRUS, PCR Not Detected     Coronavirus 229E Not Detected     Coronavirus HKU1 Not Detected     Coronavirus NL63 Not Detected     Coronavirus OC43 Not Detected     COVID19 Not Detected     Human Metapneumovirus Not Detected     Human Rhinovirus/Enterovirus Not Detected     Influenza A PCR Not Detected     Influenza B PCR Not Detected     Parainfluenza Virus 1 Not Detected     Parainfluenza Virus 2 Not Detected     Parainfluenza Virus 3 Not Detected     Parainfluenza Virus 4 Not Detected     RSV, PCR Not Detected     Bordetella pertussis pcr Not Detected     Bordetella parapertussis PCR Not Detected     Chlamydophila pneumoniae PCR Not Detected     Mycoplasma pneumo by PCR Not Detected    Narrative:      In the setting of a positive respiratory panel with a viral infection PLUS a negative procalcitonin without other underlying concern for bacterial infection, consider observing off antibiotics or discontinuation of antibiotics and continue supportive care. If the respiratory panel is positive for atypical bacterial infection (Bordetella pertussis, Chlamydophila pneumoniae, or Mycoplasma pneumoniae), consider antibiotic de-escalation to target atypical bacterial infection.    Respiratory Panel PCR w/COVID-19(SARS-CoV-2) NORMA/JUAN ALBERTO/EDY/PAD/COR/MAD/ROSALINA In-House, NP Swab in UTM/VTM, 3-4 HR TAT - Swab, Nasopharynx [[id number]]  (Normal) Collected: 09/06/23 0620    Lab Status: Final result Specimen: Swab from Nasopharynx Updated: 09/06/23 0744     ADENOVIRUS, PCR Not Detected     Coronavirus 229E Not Detected     Coronavirus HKU1 Not Detected     Coronavirus NL63 Not Detected     Coronavirus OC43 Not Detected     COVID19 Not Detected     Human Metapneumovirus Not Detected     Human Rhinovirus/Enterovirus Not Detected     Influenza A PCR Not Detected     Influenza B PCR Not Detected     Parainfluenza Virus 1 Not Detected     Parainfluenza Virus 2 Not Detected     Parainfluenza Virus 3  Not Detected     Parainfluenza Virus 4 Not Detected     RSV, PCR Not Detected     Bordetella pertussis pcr Not Detected     Bordetella parapertussis PCR Not Detected     Chlamydophila pneumoniae PCR Not Detected     Mycoplasma pneumo by PCR Not Detected    Narrative:      In the setting of a positive respiratory panel with a viral infection PLUS a negative procalcitonin without other underlying concern for bacterial infection, consider observing off antibiotics or discontinuation of antibiotics and continue supportive care. If the respiratory panel is positive for atypical bacterial infection (Bordetella pertussis, Chlamydophila pneumoniae, or Mycoplasma pneumoniae), consider antibiotic de-escalation to target atypical bacterial infection.            No radiology results from the last 24 hrs    Results for orders placed during the hospital encounter of 12/06/22    Adult Transthoracic Echo Complete W/ Cont if Necessary Per Protocol    Interpretation Summary    Left ventricular ejection fraction appears to be greater than 70%.    Left ventricular wall thickness is consistent with moderate concentric hypertrophy.    Left ventricular diastolic function is consistent with (grade I) impaired relaxation.    Estimated right ventricular systolic pressure from tricuspid regurgitation is mildly elevated (35-45 mmHg). Calculated right ventricular systolic pressure from tricuspid regurgitation is 39 mmHg.      Current medications:  Scheduled Meds:amLODIPine, 10 mg, Oral, Q24H  bethanechol, 10 mg, Oral, TID  carvedilol, 12.5 mg, Oral, BID With Meals  famotidine, 20 mg, Oral, QAM  ferrous sulfate, 325 mg, Oral, Daily With Breakfast  hydrALAZINE, 50 mg, Oral, Q8H  insulin detemir, 5 Units, Subcutaneous, Daily  insulin lispro, 2-7 Units, Subcutaneous, 4x Daily AC & at Bedtime  lactobacillus acidophilus, 1 capsule, Oral, Daily  Lidocaine, 1 patch, Transdermal, Q24H  losartan, 50 mg, Oral, Q24H  mirtazapine, 7.5 mg, Oral,  Nightly  multivitamin, 1 tablet, Oral, Daily  rivaroxaban, 15 mg, Oral, Daily With Dinner  sodium chloride, 10 mL, Intravenous, Q12H  terazosin, 1 mg, Oral, Nightly  vancomycin, 125 mg, Oral, BID      Continuous Infusions:   PRN Meds:.  acetaminophen    dextrose    dextrose    glucagon (human recombinant)    hydrALAZINE    melatonin    ondansetron **OR** ondansetron    oxyCODONE-acetaminophen    Potassium Replacement - Follow Nurse / BPA Driven Protocol    prochlorperazine **OR** prochlorperazine **OR** prochlorperazine    [COMPLETED] Insert Peripheral IV **AND** sodium chloride    sodium chloride    sodium chloride    Assessment & Plan   Assessment & Plan     Active Hospital Problems    Diagnosis  POA    **Hypertensive urgency [I16.0]  Yes    Acute UTI (urinary tract infection) [N39.0]  Yes    Nausea vomiting and diarrhea [R11.2, R19.7]  Yes    PAF (paroxysmal atrial fibrillation) [I48.0]  Yes    Anxiety associated with depression [F41.8]  Yes    Clavicle fracture [S42.009A]  Yes    CKD (chronic kidney disease) stage 3, GFR 30-59 ml/min [N18.30]  Yes    Hypokalemia [E87.6]  Yes    Gastroparesis [K31.84]  Yes    Uncontrolled type 1 diabetes mellitus with hyperglycemia [E10.65]  Yes    Hyperlipidemia [E78.5]  Yes    Hypertension [I10]  Yes      Resolved Hospital Problems   No resolved problems to display.        Brief Hospital Course to date:  Thelma Michael is a 62 y.o. female w/ a hx of poorly controlled type 1 diabetes, Afib on Xarelto, iron deficiency anemia, gastroparesis s/p gastric stimulator, HTN, HLD, migraines, chronic urinary retention (previously performed self-catheterization), strokes (chronic right lentiform nucleus and left cerebellar lacunar infarcts found on MRI in February of 2023), tobacco abuse and GERD who presented to the emergency room with complaints of nausea, vomiting and diarrhea. Patient has had issues with fevers since the evening of 9/8, repeat blood cultures have been NGTD and no  obvious new symptoms. ID consulted, repeat blood cultures from 09/09 NGTD. ID recommending zosyn, transitioned to Augmentin. Oral vancomycin for C diff prophylaxis.     This patient's problems and plans were partially entered by my partner and updated as appropriate by me 09/18/23.      SIRS   CoNS blood cultures contamination   Abdominal pain-resolved  Chronic recurrent Enteroaggregative E.coli positive diarrhea   H/o Cdiff 3/2022, also on 8/11, PCR was positive but toxin negative indicating colonization  -ID following  -Continue Vancomycin 125mg PO Bid for C diff ppx for 2 more weeks (until 9/27)  -Completed Augmentin      Hypertensive urgency -> resolved   History of hypertension  -status post Cardene gtt. BP much improved  -Continue p.o. hydralazine, Coreg, Norvasc, terazosin, Losartan     Poorly controlled type 1 diabetes A1c 11.1%   -Continue basal and SSI, adjust as warranted      Acute UTI 09/05  Hx of neurogenic bladder (previously self-cath'd)  -Urine Cx 9/5 + E coli, s/p Augmentin   -Add Urecholine  -Required I/O cath overnight  -Repeat UA today   -Ambulate       Hypokalemia-resolved  Continue to monitor and replete per protocol     CKD Stage III  -stable     PAF   -continue coreg, Xarelto      Expected Discharge Location and Transportation: [facility]  Expected Discharge   09/18/2023    DVT prophylaxis:  Medical DVT prophylaxis orders are present.     AM-PAC 6 Clicks Score (PT): 12 (09/18/23 0800)    CODE STATUS:   Code Status and Medical Interventions:   Ordered at: 09/06/23 0529     Code Status (Patient has no pulse and is not breathing):    CPR (Attempt to Resuscitate)     Medical Interventions (Patient has pulse or is breathing):    Full Support       Delia Triplett,   09/18/23

## 2023-09-18 NOTE — PLAN OF CARE
Goal Outcome Evaluation:  Plan of Care Reviewed With: patient        Progress: improving  Outcome Evaluation: Pt. with some improvement in balance only needing one person support today and progressed with mvmt to bathroom.  Pt. was able to engage in UE ROM and grooming at sinkside and toileting on standard toilet.  Recommend BSC over toilet to assist with transfers.  Pt. with R shoulder weakness needing AAROM for mvmt. Pt. remains much below baseline warranting continued skilled OT services.      Anticipated Discharge Disposition (OT): inpatient rehabilitation facility

## 2023-09-18 NOTE — DISCHARGE SUMMARY
[facility] Medicine Services  DISCHARGE SUMMARY    Patient Name: Thelma Michael  : 1960  MRN: [id number]    Date of Admission: 2023  9:08 PM  Date of Discharge:  2023  Primary Care Physician: Monet Howe APRN    Consults       Date and Time Order Name Status Description    2023  7:23 AM Inpatient Infectious Diseases Consult Completed             Hospital Course       Active Hospital Problems    Diagnosis  POA    PAF (paroxysmal atrial fibrillation) [I48.0]  Yes    Anxiety associated with depression [F41.8]  Yes    Clavicle fracture [S42.009A]  Yes    CKD (chronic kidney disease) stage 3, GFR 30-59 ml/min [N18.30]  Yes    Gastroparesis [K31.84]  Yes    Uncontrolled type 1 diabetes mellitus with hyperglycemia [E10.65]  Yes    Hyperlipidemia [E78.5]  Yes    Hypertension [I10]  Yes      Resolved Hospital Problems    Diagnosis Date Resolved POA    **Hypertensive urgency [I16.0] 2023 Yes    Acute UTI (urinary tract infection) [N39.0] 2023 Yes    Nausea vomiting and diarrhea [R11.2, R19.7] 2023 Yes    Hypokalemia [E87.6] 2023 Yes          Hospital Course:  Thelma Michael is a 62 y.o. female  w/ a hx of poorly controlled type 1 diabetes, Afib on Xarelto, iron deficiency anemia, gastroparesis s/p gastric stimulator, HTN, HLD, migraines, chronic urinary retention (previously performed self-catheterization), strokes (chronic right lentiform nucleus and left cerebellar lacunar infarcts found on MRI in 2023), tobacco abuse and GERD who presented to the emergency room with complaints of nausea, vomiting and diarrhea. Patient has had issues with fevers since the evening of , repeat blood cultures have been NGTD and no obvious new symptoms. ID consulted, repeat blood cultures from  NGTD. ID recommending zosyn, transitioned to Augmentin. Oral vancomycin for C diff prophylaxis.      SIRS   CoNS blood cultures  contamination   Abdominal pain-resolved  Chronic recurrent Enteroaggregative E.coli positive diarrhea   H/o Cdiff 3/2022, also on 8/11, PCR was positive but toxin negative indicating colonization  -ID followed  -Continue Vancomycin 125mg PO Bid for C diff ppx for 2 more weeks (until 9/27)  -Completed Augmentin      Hypertensive urgency -> resolved   History of hypertension  -status post Cardene gtt. BP much improved  -Continue p.o. hydralazine, Coreg, Norvasc, terazosin, Losartan     Poorly controlled type 1 diabetes A1c 11.1%   -Continue basal and SSI, adjust as warranted      Acute UTI 09/05  Hx of neurogenic bladder (previously self-cath'd)  -Urine Cx 9/5 + E coli, s/p Augmentin   -Add Urecholine  -I/O cath PRN, if bladder scan >500   -Ambulate      Hypokalemia-resolved  Continue to monitor and replete per protocol     CKD Stage III  -stable     PAF   -continue coreg, Xarelto     L distal Clavicle Fx  -Diagnosed in ED 9/1  -Follow-up with Dr Malagon 1-2 weeks   -Pain Control  -PT/OT  -Sling for comfort   -NWB LUE until Ortho follow-up    Discharge Follow Up Recommendations for outpatient labs/diagnostics:  -PCP 1 week  -Dr Morrow 1-2 weeks     Day of Discharge     HPI:   Patient seen and examined. Required I/O cath overnight. Has Hx of self-cathing at home. Asks about rehab will work at [facility].     Vital Signs:   Temp:  [97.5 °F (36.4 °C)-100.3 °F (37.9 °C)] 98 °F (36.7 °C)  Heart Rate:  [74-81] 76  Resp:  [16] 16  BP: (114-158)/(53-77) 140/75      Physical Exam:  Constitutional: No acute distress, awake, alert, frail appearing   HENT: NCAT, mucous membranes moist  Respiratory: Clear to auscultation bilaterally, respiratory effort normal   Cardiovascular: RRR, no murmurs, rubs, or gallops  Gastrointestinal: Positive bowel sounds, soft, nontender, nondistended  Musculoskeletal: No bilateral ankle edema  Psychiatric: Appropriate affect, cooperative  Neurologic: No focal deficits, speech clear  Skin: No rashes      Pertinent  and/or Most Recent Results     LAB RESULTS:      Lab 09/17/23 0327 09/14/23 0300 09/13/23 0305 09/12/23 0349 09/11/23  1602   WBC 8.52 8.35 8.80 10.14  --    HEMOGLOBIN 9.4* 9.9* 9.9* 9.8*  --    HEMATOCRIT 29.2* 30.4* 30.0* 30.0*  --    PLATELETS 530* 530* 470* 426  --    NEUTROS ABS 5.37 5.32 6.22 7.43*  --    IMMATURE GRANS (ABS) 0.15* 0.15* 0.12* 0.08*  --    LYMPHS ABS 2.16 1.96 1.61 1.53  --    MONOS ABS 0.69 0.78 0.69 0.91*  --    EOS ABS 0.12 0.12 0.12 0.17  --    MCV 91.5 89.4 89.6 89.8  --    PROCALCITONIN  --   --   --   --  0.15   LACTATE  --   --   --   --  1.5         Lab 09/17/23 0328 09/14/23 0300 09/13/23 0305 09/12/23 0349   SODIUM 141 141 143 141   POTASSIUM 4.3 4.0 4.1 3.8   CHLORIDE 109* 106 110* 109*   CO2 24.0 24.0 18.0* 23.0   ANION GAP 8.0 11.0 15.0 9.0   BUN 17 20 15 14   CREATININE 1.11* 1.18* 1.11* 1.21*   EGFR 55.6* 51.7* 55.6* 50.2*   GLUCOSE 234* 160* 142* 58*   CALCIUM 8.7 8.2* 8.5* 8.4*   MAGNESIUM  --   --   --  2.1         Lab 09/14/23 0300 09/13/23 0305 09/12/23 0349   TOTAL PROTEIN 6.0 6.1 5.9*   ALBUMIN 2.9* 2.8* 2.8*   GLOBULIN 3.1 3.3 3.1   ALT (SGPT) <5 5 5   AST (SGOT) 12 9 9   BILIRUBIN 0.2 0.2 0.2   ALK PHOS 65 66 69                     Brief Urine Lab Results  (Last result in the past 365 days)        Color   Clarity   Blood   Leuk Est   Nitrite   Protein   CREAT   Urine HCG        09/11/23 1536 Yellow   Turbid   Large (3+)   Large (3+)   Negative   100 mg/dL (2+)                 Microbiology Results (last 10 days)       Procedure Component Value - Date/Time    Urine Culture - Urine, Urine, Random Void [[id number]]  (Normal) Collected: 09/11/23 1536    Lab Status: Final result Specimen: Urine, Random Void Updated: 09/13/23 0747     Urine Culture No growth    Blood Culture - Blood, Hand, Right [[id number]]  (Normal) Collected: 09/09/23 1751    Lab Status: Final result Specimen: Blood from Hand, Right Updated: 09/14/23 1845     Blood Culture No  growth at 5 days    Blood Culture - Blood, Hand, Left [[id number]]  (Normal) Collected: 09/09/23 1747    Lab Status: Final result Specimen: Blood from Hand, Left Updated: 09/14/23 1845     Blood Culture No growth at 5 days    Respiratory Panel PCR w/COVID-19(SARS-CoV-2) [facility]/EDY/PAD/COR/MAD/ROSALINA In-House, NP Swab in UTM/VTM, 3-4 HR TAT - Swab, Nasopharynx [[id number]]  (Normal) Collected: 09/08/23 1714    Lab Status: Final result Specimen: Swab from Nasopharynx Updated: 09/08/23 1818     ADENOVIRUS, PCR Not Detected     Coronavirus 229E Not Detected     Coronavirus HKU1 Not Detected     Coronavirus NL63 Not Detected     Coronavirus OC43 Not Detected     COVID19 Not Detected     Human Metapneumovirus Not Detected     Human Rhinovirus/Enterovirus Not Detected     Influenza A PCR Not Detected     Influenza B PCR Not Detected     Parainfluenza Virus 1 Not Detected     Parainfluenza Virus 2 Not Detected     Parainfluenza Virus 3 Not Detected     Parainfluenza Virus 4 Not Detected     RSV, PCR Not Detected     Bordetella pertussis pcr Not Detected     Bordetella parapertussis PCR Not Detected     Chlamydophila pneumoniae PCR Not Detected     Mycoplasma pneumo by PCR Not Detected    Narrative:      In the setting of a positive respiratory panel with a viral infection PLUS a negative procalcitonin without other underlying concern for bacterial infection, consider observing off antibiotics or discontinuation of antibiotics and continue supportive care. If the respiratory panel is positive for atypical bacterial infection (Bordetella pertussis, Chlamydophila pneumoniae, or Mycoplasma pneumoniae), consider antibiotic de-escalation to target atypical bacterial infection.            CT Chest Without Contrast Diagnostic    Result Date: 9/12/2023  CT CHEST WO CONTRAST DIAGNOSTIC Date of Exam: 9/12/2023 10:05 AM EDT Indication: Pneumonia, complication suspected, xray done Follow up possible PNA on CT abd/pelvis. Comparison: CT  abdomen of 9/11/2023. Prior CT chest is dated February 4, 2023 FINDINGS: There is worsening fairly extensive infiltrate in the right lower lobe with multifocal patchy areas of consolidation. There is infiltrate in the posterior segment of the lateral right middle lobe with patchy area of consolidation, not included on the CT abdomen of 9/11/2023 and new as compared to the prior CT chest. There is some slight scar in the left lung base. There are no significant infiltrates on the left.. Cystic lesion of the right thyroid is stable since the prior CT chest. There are  no enlarged mediastinal nodes. There are no pleural effusions. Technique: Axial CT images were obtained of the chest without contrast administration.  Reconstructed coronal and sagittal images were also obtained. Automated exposure control and iterative construction methods were used.     Impression: Right middle and right lower lobe pneumonia. Electronically Signed: Kerri Peters MD  9/12/2023 11:17 AM EDT  Workstation ID: [id number]    CT Abdomen Pelvis With Contrast    Result Date: 9/11/2023  CT ABDOMEN PELVIS W CONTRAST Date of Exam: 9/11/2023 10:45 AM EDT Indication: Abdominal pain, diarrhea. Comparison: 9/6/2023 Technique: Axial CT images were obtained of the abdomen and pelvis following the uneventful intravenous administration of 75 mL Isovue-300. Reconstructed coronal and sagittal images were also obtained. Automated exposure control and iterative construction methods were used. Findings: Lung Bases:  There is interval development of focal peripheral nodular airspace disease in the right lower lobe compatible with pneumonia versus nodular atelectasis. Again noticed a gastric stimulator device. Liver:Liver is normal in size and CT density. No focal lesions. Biliary/Gallbladder: The gallbladder is without evidence of radiopaque stones. The biliary tree is nondilated. Spleen:Spleen is normal in size and CT density. Pancreas: Pancreas shows  homogeneous density. There is no evidence of pancreatic mass or peripancreatic fluid. Kidneys: Kidneys are normal in size. There are no stones or hydronephrosis. Few tiny left renal cysts Adrenals: Stable diffuse thickening of the adrenal glands compatible with adrenal hyperplasia Retroperitoneal/Lymph Nodes/Vasculature: No retroperitoneal adenopathy is identified by size criteria. Gastrointestinal/Mesentery: The bowel loops are non-dilated without definite wall thickening or mass. The appendix appears within normal limits. No evidence of obstruction. No free air. Air-fluid levels throughout the large bowel with no associated wall thickening compatible with diarrhea Bladder: The bladder is better distended than on the previous examination with mild persistent thickening of the roof of the bladder wall which may represent chronic cystitis. There are no intraluminal calcifications   Bony Structures:  Visualized bony structures are consistent with the patient's age.     Impression: 1. Nondilated large bowel with air-fluid levels and no wall thickening in keeping with the patient's history of diarrhea 2. Interval development of a peripheral nodular density in the right lower lobe may represent nodular atelectasis or pneumonia. Clinical relation and close follow-up recommended with CT chest 3. Additional stable nonemergent findings as above Electronically Signed: Quinton Juarez MD  9/11/2023 12:12 PM EDT  Workstation ID: [id number]     Results for orders placed during the hospital encounter of 04/30/23    Duplex Renal Artery - Bilateral Complete CAR    Interpretation Summary  DUPLEX RENAL ARTERY BILATERAL COMPLETE CAR    Date of Exam: 5/5/2023 9:25 AM EDT    Indication: essential hypertension.    Comparison: No comparisons available.    Technique: Grayscale, color-flow, Doppler spectral waveform analysis was performed of the kidneys, renal arteries, and aorta.      Findings:  Kidneys are normal in size, 11.4 cm in length  on the right and 10.1 cm in length on the left. Renal arteries are generally well visualized. Maximal peak systolic velocity of the right renal artery in the upper range of normal at 181 cm/s at the right  renal origin. Maximal peak systolic velocities in the left renal artery are within normal limits, up to 160 cm/s in the left renal origin. Renal aortic ratios are normal, 2.0 on the right and 1.4 on the left. Resistive indices are within within normal  limits except for mildly elevated mid right renal artery resistive index of 0.79, as an isolated finding, only questionable for underlying renal parenchymal disease.    ,    Impression  Impression:  Negative bilateral duplex renal artery ultrasound. No evidence of hemodynamically significant renal artery stenosis.        Electronically Signed: Krishna Scott  5/5/2023 5:37 PM EDT  Workstation ID: [id number]      Results for orders placed during the hospital encounter of 04/30/23    Duplex Renal Artery - Bilateral Complete CAR    Interpretation Summary  DUPLEX RENAL ARTERY BILATERAL COMPLETE CAR    Date of Exam: 5/5/2023 9:25 AM EDT    Indication: essential hypertension.    Comparison: No comparisons available.    Technique: Grayscale, color-flow, Doppler spectral waveform analysis was performed of the kidneys, renal arteries, and aorta.      Findings:  Kidneys are normal in size, 11.4 cm in length on the right and 10.1 cm in length on the left. Renal arteries are generally well visualized. Maximal peak systolic velocity of the right renal artery in the upper range of normal at 181 cm/s at the right  renal origin. Maximal peak systolic velocities in the left renal artery are within normal limits, up to 160 cm/s in the left renal origin. Renal aortic ratios are normal, 2.0 on the right and 1.4 on the left. Resistive indices are within within normal  limits except for mildly elevated mid right renal artery resistive index of 0.79, as an isolated finding, only questionable for  underlying renal parenchymal disease.    ,    Impression  Impression:  Negative bilateral duplex renal artery ultrasound. No evidence of hemodynamically significant renal artery stenosis.        Electronically Signed: Krishna Garciad  5/5/2023 5:37 PM EDT  Workstation ID: [id number]      Results for orders placed during the hospital encounter of 12/06/22    Adult Transthoracic Echo Complete W/ Cont if Necessary Per Protocol    Interpretation Summary    Left ventricular ejection fraction appears to be greater than 70%.    Left ventricular wall thickness is consistent with moderate concentric hypertrophy.    Left ventricular diastolic function is consistent with (grade I) impaired relaxation.    Estimated right ventricular systolic pressure from tricuspid regurgitation is mildly elevated (35-45 mmHg). Calculated right ventricular systolic pressure from tricuspid regurgitation is 39 mmHg.      Plan for Follow-up of Pending Labs/Results: Inbox     Discharge Details        Discharge Medications        New Medications        Instructions Start Date   bethanechol 10 MG tablet  Commonly known as: URECHOLINE   10 mg, Oral, 3 Times Daily      famotidine 20 MG tablet  Commonly known as: PEPCID   20 mg, Oral, Every Morning   Start Date: September 19, 2023     hydrALAZINE 50 MG tablet  Commonly known as: APRESOLINE   50 mg, Oral, Every 8 Hours Scheduled      insulin detemir 100 UNIT/ML injection  Commonly known as: LEVEMIR   5 Units, Subcutaneous, Daily   Start Date: September 19, 2023     Lidocaine 4 %   1 patch, Transdermal, Every 24 Hours Scheduled, Remove & Discard patch within 12 hours or as directed by MD   Start Date: September 19, 2023     oxyCODONE-acetaminophen 5-325 MG per tablet  Commonly known as: PERCOCET   1 tablet, Oral, Every 6 Hours PRN      prochlorperazine 5 MG tablet  Commonly known as: COMPAZINE   5 mg, Oral, Every 6 Hours PRN      vancomycin 125 MG capsule  Commonly known as: VANCOCIN   125 mg, Oral, 2 Times  Daily             Changes to Medications        Instructions Start Date   carvedilol 12.5 MG tablet  Commonly known as: COREG  What changed:   medication strength  how much to take   12.5 mg, Oral, 2 Times Daily With Meals      ferrous sulfate 325 (65 Fe) MG tablet  What changed: when to take this   Take with Vitamin C or Juice              Continue These Medications        Instructions Start Date   acetaminophen 325 MG tablet  Commonly known as: TYLENOL   650 mg, Oral, Every 4 Hours PRN      albuterol sulfate  (90 Base) MCG/ACT inhaler  Commonly known as: PROVENTIL HFA;VENTOLIN HFA;PROAIR HFA   2 puffs, Inhalation, Every 4 Hours PRN      amLODIPine 10 MG tablet  Commonly known as: NORVASC   10 mg, Oral, Every 24 Hours Scheduled      calcium carbonate 500 MG chewable tablet  Commonly known as: TUMS   2 tablets, Oral, 3 Times Daily PRN      FreeStyle Lite w/Device kit   1 Device, Does not apply, 3 Times Daily      glucose blood test strip   Check blood sugar 5 times daily as needed.      Insulin Lispro 100 UNIT/ML injection  Commonly known as: humaLOG   2-7 Units, Subcutaneous, 4 Times Daily Before Meals & Nightly      lactobacillus acidophilus capsule capsule   1 capsule, Oral, Daily      losartan 50 MG tablet  Commonly known as: COZAAR   50 mg, Oral, Every 24 Hours Scheduled      melatonin 5 MG tablet tablet   5 mg, Oral, Nightly PRN      mirtazapine 7.5 MG tablet  Commonly known as: REMERON   7.5 mg, Oral, Nightly      multivitamin tablet tablet   1 tablet, Oral, Daily      terazosin 1 MG capsule  Commonly known as: HYTRIN   1 mg, Oral, Nightly      vitamin D 1.25 MG (54480 UT) capsule capsule  Commonly known as: ERGOCALCIFEROL   50,000 Units, Oral, Weekly      Xarelto 15 MG tablet  Generic drug: rivaroxaban   Take 1 tablet by mouth Daily With Dinner             Stop These Medications      gabapentin 100 MG capsule  Commonly known as: NEURONTIN     HYDROcodone-acetaminophen 5-325 MG per tablet  Commonly  known as: NORCO     loperamide 2 MG tablet  Commonly known as: Imodium A-D     pantoprazole 40 MG EC tablet  Commonly known as: PROTONIX     sennosides-docusate 8.6-50 MG per tablet  Commonly known as: PERICOLACE     traMADol 50 MG tablet  Commonly known as: ULTRAM              No Known Allergies      Discharge Disposition:  Skilled Nursing Facility (DC - External)    Diet:  Hospital:  Diet Order   Procedures    Diet: Cardiac Diets, Diabetic Diets, Gastrointestinal Diets; Healthy Heart (2-3 Na+); Consistent Carbohydrate; Low Irritant; Texture: Soft to Chew (NDD 3); Soft to Chew: Whole Meat; Fluid Consistency: Thin (IDDSI 0)       Activity:      Restrictions or Other Recommendations:       CODE STATUS:    Code Status and Medical Interventions:   Ordered at: 09/06/23 0529     Code Status (Patient has no pulse and is not breathing):    CPR (Attempt to Resuscitate)     Medical Interventions (Patient has pulse or is breathing):    Full Support       Future Appointments   Date Time Provider Department Center   9/19/2023  1:40 PM [facility]   9/26/2023  2:45 PM Monet Howe APRN [facility]   10/27/2023 12:45 PM Gerson Ochoa MD [facility]       Additional Instructions for the Follow-ups that You Need to Schedule       Discharge Follow-up with PCP   As directed       Currently Documented PCP:    Monet Howe APRN    PCP Phone Number:    [phone]     Follow Up Details: 1 week        Discharge Follow-up with Specified Provider: Dr Malagon; 1 Week   As directed      To: Dr Malagon   Follow Up: 1 Week   Follow Up Details: Clavicle Fx                      Delia Triplett DO  09/18/23      Time Spent on Discharge:  I spent  50  minutes on this discharge activity which included: face-to-face encounter with the patient, reviewing the data in the system, coordination of the care with the nursing staff as well as consultants, documentation, and entering  orders.

## 2023-09-27 NOTE — PROGRESS NOTES
I have reviewed the notes, assessments, and/or procedures performed by HOWIE Jimenez and I concur with her documentation of Thelma Michael.   None

## 2023-10-04 ENCOUNTER — TELEPHONE (OUTPATIENT)
Dept: INTERNAL MEDICINE | Facility: CLINIC | Age: 63
End: 2023-10-04
Payer: COMMERCIAL

## 2023-10-07 DIAGNOSIS — M19.90 OSTEOARTHRITIS, UNSPECIFIED OSTEOARTHRITIS TYPE, UNSPECIFIED SITE: ICD-10-CM

## 2023-10-09 ENCOUNTER — TELEPHONE (OUTPATIENT)
Dept: INTERNAL MEDICINE | Facility: CLINIC | Age: 63
End: 2023-10-09

## 2023-10-09 RX ORDER — TRAMADOL HYDROCHLORIDE 50 MG/1
50 TABLET ORAL EVERY 6 HOURS PRN
Qty: 28 TABLET | Refills: 0 | OUTPATIENT
Start: 2023-10-09

## 2023-10-11 ENCOUNTER — TELEPHONE (OUTPATIENT)
Dept: INTERNAL MEDICINE | Facility: CLINIC | Age: 63
End: 2023-10-11
Payer: COMMERCIAL

## 2023-10-11 NOTE — TELEPHONE ENCOUNTER
Caller: Thelma Michael    Relationship: Self    Best call back number: [phone]    Equipment requested: WRIST BLOOD PRESSURE MONITOR    Reason for the request: PATIENT'S MONITOR IS NOT WORKING ANYMORE    Prescribing Provider: MARKY JIMENEZ    Additional information or concerns: PATIENT WOULD LIKE ANOTHER WRIST BLOOD PRESSURE MONITOR SENT TO [facility]. PLEASE ADVISE

## 2023-10-12 NOTE — TELEPHONE ENCOUNTER
Caller: Thelma Michael Irwin    Relationship: Self    Best call back number: [phone]    What medications are you currently taking:   Current Outpatient Medications on File Prior to Visit   Medication Sig Dispense Refill    acetaminophen (TYLENOL) 325 MG tablet Take 2 tablets by mouth Every 4 (Four) Hours As Needed for Mild Pain .      albuterol sulfate  (90 Base) MCG/ACT inhaler Inhale 2 puffs Every 4 (Four) Hours As Needed for Wheezing. 18 g 5    amLODIPine (NORVASC) 10 MG tablet Take 1 tablet by mouth Daily. 30 tablet 0    bethanechol (URECHOLINE) 10 MG tablet Take 1 tablet by mouth 3 (Three) Times a Day.      Blood Glucose Monitoring Suppl (FreeStyle Lite) w/Device kit 1 Device 3 (Three) Times a Day. 1 kit 0    calcium carbonate (TUMS) 500 MG chewable tablet Chew 1,000 mg 3 (Three) Times a Day As Needed for Indigestion or Heartburn.      carvedilol (COREG) 12.5 MG tablet Take 1 tablet by mouth 2 (Two) Times a Day With Meals.      famotidine (PEPCID) 20 MG tablet Take 1 tablet by mouth Every Morning.      ferrous sulfate 325 (65 Fe) MG tablet Take with Vitamin C or Juice (Patient taking differently: Daily With Breakfast. Take with Vitamin C or Juice) 30 tablet 0    glucose blood test strip Check blood sugar 5 times daily as needed. 200 each 5    hydrALAZINE (APRESOLINE) 50 MG tablet Take 1 tablet by mouth Every 8 (Eight) Hours.      insulin detemir (LEVEMIR) 100 UNIT/ML injection Inject 5 Units under the skin into the appropriate area as directed Daily.  12    Insulin Lispro (humaLOG) 100 UNIT/ML injection Inject 2-7 Units under the skin into the appropriate area as directed 4 (Four) Times a Day Before Meals & at Bedtime. 10 mL 12    lactobacillus acidophilus (RISAQUAD) capsule capsule Take 1 capsule by mouth Daily. 30 capsule 0    Lidocaine 4 % Place 1 patch on the skin as directed by provider Daily. Remove & Discard patch within 12 hours or as directed by MD      losartan (COZAAR) 50 MG tablet Take  1 tablet by mouth Daily. 90 tablet 0    melatonin 5 MG tablet tablet Take 1 tablet by mouth At Night As Needed (sleep).      mirtazapine (REMERON) 7.5 MG tablet Take 1 tablet by mouth Every Night. 30 tablet 5    Multivitamin tablet tablet Take 1 tablet by mouth Daily. 30 tablet 0    prochlorperazine (COMPAZINE) 5 MG tablet Take 1 tablet by mouth Every 6 (Six) Hours As Needed for Nausea or Vomiting.      rivaroxaban (XARELTO) 15 MG tablet Take 1 tablet by mouth Daily With Dinner 42 tablet 2    terazosin (HYTRIN) 1 MG capsule Take 1 capsule by mouth Every Night. 30 capsule 0    vitamin D (ERGOCALCIFEROL) 1.25 MG (62313 UT) capsule capsule Take 1 capsule by mouth 1 (One) Time Per Week. 4 capsule 2    [DISCONTINUED] Insulin Glargine (BASAGLAR KWIKPEN) 100 UNIT/ML injection pen Inject 10 Units under the skin into the appropriate area as directed Every Night. 15 mL 3     No current facility-administered medications on file prior to visit.            Which medication are you concerned about: WRIST BLOOD PRESSURE MONITOR    Who prescribed you this medication: MARKY JIMENEZ    What are your concerns: [facility] DOES NOT CARRY THESE IN STOCK AND STATES IF THIS COULD BE SENT TO A MEDICAL SUPPLY COMPANY THAT INSURANCE SHOULD PAY FOR IT. PLEASE ADVISE

## 2023-10-19 ENCOUNTER — OFFICE VISIT (OUTPATIENT)
Dept: INTERNAL MEDICINE | Facility: CLINIC | Age: 63
End: 2023-10-19
Payer: COMMERCIAL

## 2023-10-19 VITALS
BODY MASS INDEX: 21.35 KG/M2 | HEIGHT: 68 IN | HEART RATE: 68 BPM | OXYGEN SATURATION: 98 % | DIASTOLIC BLOOD PRESSURE: 78 MMHG | SYSTOLIC BLOOD PRESSURE: 118 MMHG | TEMPERATURE: 97.7 F

## 2023-10-19 DIAGNOSIS — S42.035D CLOSED NONDISPLACED FRACTURE OF ACROMIAL END OF LEFT CLAVICLE WITH ROUTINE HEALING, SUBSEQUENT ENCOUNTER: Primary | ICD-10-CM

## 2023-10-19 DIAGNOSIS — I10 PRIMARY HYPERTENSION: ICD-10-CM

## 2023-10-19 DIAGNOSIS — Z79.4 TYPE 2 DIABETES MELLITUS WITH DIABETIC POLYNEUROPATHY, WITH LONG-TERM CURRENT USE OF INSULIN: ICD-10-CM

## 2023-10-19 DIAGNOSIS — Z23 NEEDS FLU SHOT: ICD-10-CM

## 2023-10-19 DIAGNOSIS — E11.42 TYPE 2 DIABETES MELLITUS WITH DIABETIC POLYNEUROPATHY, WITH LONG-TERM CURRENT USE OF INSULIN: ICD-10-CM

## 2023-10-19 RX ORDER — LIDOCAINE 50 MG/G
1 PATCH TOPICAL EVERY 24 HOURS
Qty: 30 PATCH | Refills: 1 | Status: SHIPPED | OUTPATIENT
Start: 2023-10-19

## 2023-10-19 RX ORDER — ATORVASTATIN CALCIUM 80 MG/1
80 TABLET, FILM COATED ORAL DAILY
Qty: 90 TABLET | Refills: 1 | Status: SHIPPED | OUTPATIENT
Start: 2023-10-19

## 2023-10-19 RX ORDER — GABAPENTIN 100 MG/1
100 CAPSULE ORAL 3 TIMES DAILY PRN
Qty: 90 CAPSULE | Refills: 2 | Status: SHIPPED | OUTPATIENT
Start: 2023-10-19

## 2023-10-19 RX ORDER — TRAMADOL HYDROCHLORIDE 50 MG/1
50 TABLET ORAL EVERY 6 HOURS PRN
Qty: 28 TABLET | Refills: 2 | Status: SHIPPED | OUTPATIENT
Start: 2023-10-19

## 2023-10-19 NOTE — PROGRESS NOTES
Subjective   Chief Complaint   Patient presents with    Follow-up     Follow from [facility]- fall in [place]- shoulder injury / distal clavicle fracture      Thelma Michael is a 62 y.o. female.     The patient is here today for a follow-up at [facility] after a fall at [place]. She had a shoulder injury and distal clavicular fracture.    Closed nondisplaced fracture of left clavicle  The patient states that she was on vacation and fell in the sand and broke her left clavicle. She was in a hole in the sand and it was nighttime going down the trail to the [place]. She could tell immediately that something was wrong. She got in the car the next day and the  was driving and they came back to [place]. She went to the emergency room at [facility]. She was in a hole where she could not move. A retired paramedic put a thing on her arm, but she used a scarf instead. She was given 12 boxes and 5 in each box that they sent her home with. It helps with the pain. It is hard for her to use the bathroom. She can not get up off the toilet because her arm is weak. She can not cook for herself, dress herself, or give herself a bath. She is using 2 lidocaine patches a day. She has a follow-up appointment with the surgeon on 10/28/2023. When she went to the emergency room, they took x-rays. She saw him while she was in [facility] and he took x-rays and he said from that time she was healing fine. She is not sure if it was displaced or if it was in alignment. She is still taking gabapentin 100 mg 3 times a day. Her tramadol refill  in 10/2023. She called to get it refilled and they told her that it just . She was discharged from the hospital on 10/03/2023. She was told to take extra strength Tylenol and ibuprofen.    Gastroparesis  The patient has not had an appetite for the past couple of days. She has been sick to her stomach. Last time she was at [facility], she was told that she had E.  coli. She states that she had an appointment with gastroenterology while she was in the hospital. She states that she talked to her son on the phone and they told her when she got out to call and make an appointment. She states that she was getting better with the gastroparesis. She is sick to her stomach now. She is tired of smoking to try to get an appetite. She is not liking how it makes her feel. It was helping in the beginning, but this past couple of months, it is making her feel really weird. She does not like the high feeling.    Type 2 diabetes  Her pump  on her and it is home charging right now. She has been working on getting it down. Her numbers have been pretty good while she was in the hospital and while she was home. She has an appointment with Dr. Ochoa on 10/27/2023.    Hyperlipidemia  The patient was prescribed atorvastatin, but looks like this was discontinued at one of her hospitalizations accidentally on discharge. The patient's last lipid panel showed elevated LDL.      I have reviewed the following portions of the patient's history and confirmed they are accurate: allergies, current medications, past family history, past medical history, past social history, past surgical history, and problem list    Review of Systems  Pertinent items are noted in HPI.     Current Outpatient Medications on File Prior to Visit   Medication Sig    acetaminophen (TYLENOL) 325 MG tablet Take 2 tablets by mouth Every 4 (Four) Hours As Needed for Mild Pain .    albuterol sulfate  (90 Base) MCG/ACT inhaler Inhale 2 puffs Every 4 (Four) Hours As Needed for Wheezing.    amLODIPine (NORVASC) 10 MG tablet Take 1 tablet by mouth Daily.    bethanechol (URECHOLINE) 10 MG tablet Take 1 tablet by mouth 3 (Three) Times a Day.    Blood Glucose Monitoring Suppl (FreeStyle Lite) w/Device kit 1 Device 3 (Three) Times a Day.    calcium carbonate (TUMS) 500 MG chewable tablet Chew 1,000 mg 3 (Three) Times a Day As Needed  for Indigestion or Heartburn.    carvedilol (COREG) 12.5 MG tablet Take 1 tablet by mouth 2 (Two) Times a Day With Meals.    famotidine (PEPCID) 20 MG tablet Take 1 tablet by mouth Every Morning.    ferrous sulfate 325 (65 Fe) MG tablet Take with Vitamin C or Juice (Patient taking differently: Daily With Breakfast. Take with Vitamin C or Juice)    glucose blood test strip Check blood sugar 5 times daily as needed.    hydrALAZINE (APRESOLINE) 50 MG tablet Take 1 tablet by mouth Every 8 (Eight) Hours.    Insulin Lispro (humaLOG) 100 UNIT/ML injection Inject 2-7 Units under the skin into the appropriate area as directed 4 (Four) Times a Day Before Meals & at Bedtime.    lactobacillus acidophilus (RISAQUAD) capsule capsule Take 1 capsule by mouth Daily.    losartan (COZAAR) 50 MG tablet Take 1 tablet by mouth Daily.    melatonin 5 MG tablet tablet Take 1 tablet by mouth At Night As Needed (sleep).    mirtazapine (REMERON) 7.5 MG tablet Take 1 tablet by mouth Every Night.    Multivitamin tablet tablet Take 1 tablet by mouth Daily.    prochlorperazine (COMPAZINE) 5 MG tablet Take 1 tablet by mouth Every 6 (Six) Hours As Needed for Nausea or Vomiting.    rivaroxaban (XARELTO) 15 MG tablet Take 1 tablet by mouth Daily With Dinner    terazosin (HYTRIN) 1 MG capsule Take 1 capsule by mouth Every Night.    vitamin D (ERGOCALCIFEROL) 1.25 MG (25666 UT) capsule capsule Take 1 capsule by mouth 1 (One) Time Per Week.    [DISCONTINUED] Lidocaine 4 % Place 1 patch on the skin as directed by provider Daily. Remove & Discard patch within 12 hours or as directed by MD    insulin detemir (LEVEMIR) 100 UNIT/ML injection Inject 5 Units under the skin into the appropriate area as directed Daily. (Patient not taking: Reported on 10/19/2023)    [DISCONTINUED] Insulin Glargine (BASAGLAR KWIKPEN) 100 UNIT/ML injection pen Inject 10 Units under the skin into the appropriate area as directed Every Night.     No current facility-administered  "medications on file prior to visit.       Objective   Vitals:    10/19/23 1548   BP: 118/78   BP Location: Right arm   Patient Position: Sitting   Cuff Size: Adult   Pulse: 68   Temp: 97.7 °F (36.5 °C)   TempSrc: Tympanic   SpO2: 98%   Weight: Comment: unable   Height: 172.7 cm (68\")     Body mass index is 21.35 kg/m².    Physical Exam  Vitals reviewed.   Constitutional:       Appearance: Normal appearance. She is well-developed.   HENT:      Head: Normocephalic and atraumatic.      Nose: Nose normal.   Eyes:      General: Lids are normal.      Conjunctiva/sclera: Conjunctivae normal.      Pupils: Pupils are equal, round, and reactive to light.   Neck:      Thyroid: No thyromegaly.      Trachea: Trachea normal.   Cardiovascular:      Rate and Rhythm: Normal rate and regular rhythm.      Heart sounds: Normal heart sounds.   Pulmonary:      Effort: Pulmonary effort is normal. No respiratory distress.      Breath sounds: Normal breath sounds.   Musculoskeletal:      Comments: Left clavicular swelling and pain. Left arm in sling.    Skin:     General: Skin is warm and dry.   Neurological:      Mental Status: She is alert and oriented to person, place, and time.      GCS: GCS eye subscore is 4. GCS verbal subscore is 5. GCS motor subscore is 6.   Psychiatric:         Attention and Perception: Attention normal.         Mood and Affect: Mood normal.         Speech: Speech normal.         Behavior: Behavior normal. Behavior is cooperative.         Thought Content: Thought content normal.         Assessment & Plan   Problem List Items Addressed This Visit       Hypertension    Clavicle fracture - Primary    Relevant Medications    lidocaine (LIDODERM) 5 %    traMADol (ULTRAM) 50 MG tablet     Other Visit Diagnoses       Type 2 diabetes mellitus with diabetic polyneuropathy, with long-term current use of insulin        Relevant Medications    gabapentin (NEURONTIN) 100 MG capsule             1. Closed nondisplaced fracture of " left clavicle  - New, unstable.  - Start tramadol as needed for pain.  - Patient has upcoming appointment with orthopedics for consult.  - Continue with left arm sling.    2. Hypertension  - Chronic, stable.  - Continue with losartan, hydralazine, carvedilol, and amlodipine.  - Follow a heart healthy, low cholesterol diet.  - Patient's last BMP was 1 month ago showing stage 3 kidney disease.  - We will continue to monitor this.  - Complete fasting lipid panel in 3 months.    3. Hyperlipidemia  - Chronic, unstable.  - Patient will complete fasting lipid panel in 3 months.  - Follow heart healthy, low cholesterol, high fiber diet.  - Restart atorvastatin.    4. Type 2 diabetes  - Chronic, unstable.  - Patient will continue with Humalog insulin pump.  - Has upcoming appointment with endocrinology.  - She will follow diabetic diet.      Current Outpatient Medications:     acetaminophen (TYLENOL) 325 MG tablet, Take 2 tablets by mouth Every 4 (Four) Hours As Needed for Mild Pain ., Disp: , Rfl:     albuterol sulfate  (90 Base) MCG/ACT inhaler, Inhale 2 puffs Every 4 (Four) Hours As Needed for Wheezing., Disp: 18 g, Rfl: 5    amLODIPine (NORVASC) 10 MG tablet, Take 1 tablet by mouth Daily., Disp: 30 tablet, Rfl: 0    bethanechol (URECHOLINE) 10 MG tablet, Take 1 tablet by mouth 3 (Three) Times a Day., Disp: , Rfl:     Blood Glucose Monitoring Suppl (FreeStyle Lite) w/Device kit, 1 Device 3 (Three) Times a Day., Disp: 1 kit, Rfl: 0    calcium carbonate (TUMS) 500 MG chewable tablet, Chew 1,000 mg 3 (Three) Times a Day As Needed for Indigestion or Heartburn., Disp: , Rfl:     carvedilol (COREG) 12.5 MG tablet, Take 1 tablet by mouth 2 (Two) Times a Day With Meals., Disp: , Rfl:     famotidine (PEPCID) 20 MG tablet, Take 1 tablet by mouth Every Morning., Disp: , Rfl:     ferrous sulfate 325 (65 Fe) MG tablet, Take with Vitamin C or Juice (Patient taking differently: Daily With Breakfast. Take with Vitamin C or Juice),  Disp: 30 tablet, Rfl: 0    glucose blood test strip, Check blood sugar 5 times daily as needed., Disp: 200 each, Rfl: 5    hydrALAZINE (APRESOLINE) 50 MG tablet, Take 1 tablet by mouth Every 8 (Eight) Hours., Disp: , Rfl:     Insulin Lispro (humaLOG) 100 UNIT/ML injection, Inject 2-7 Units under the skin into the appropriate area as directed 4 (Four) Times a Day Before Meals & at Bedtime., Disp: 10 mL, Rfl: 12    lactobacillus acidophilus (RISAQUAD) capsule capsule, Take 1 capsule by mouth Daily., Disp: 30 capsule, Rfl: 0    losartan (COZAAR) 50 MG tablet, Take 1 tablet by mouth Daily., Disp: 90 tablet, Rfl: 0    melatonin 5 MG tablet tablet, Take 1 tablet by mouth At Night As Needed (sleep)., Disp: , Rfl:     mirtazapine (REMERON) 7.5 MG tablet, Take 1 tablet by mouth Every Night., Disp: 30 tablet, Rfl: 5    Multivitamin tablet tablet, Take 1 tablet by mouth Daily., Disp: 30 tablet, Rfl: 0    prochlorperazine (COMPAZINE) 5 MG tablet, Take 1 tablet by mouth Every 6 (Six) Hours As Needed for Nausea or Vomiting., Disp: , Rfl:     rivaroxaban (XARELTO) 15 MG tablet, Take 1 tablet by mouth Daily With Dinner, Disp: 42 tablet, Rfl: 2    terazosin (HYTRIN) 1 MG capsule, Take 1 capsule by mouth Every Night., Disp: 30 capsule, Rfl: 0    vitamin D (ERGOCALCIFEROL) 1.25 MG (70897 UT) capsule capsule, Take 1 capsule by mouth 1 (One) Time Per Week., Disp: 4 capsule, Rfl: 2    atorvastatin (Lipitor) 80 MG tablet, Take 1 tablet by mouth Daily., Disp: 90 tablet, Rfl: 1    gabapentin (NEURONTIN) 100 MG capsule, Take 1 capsule by mouth 3 (Three) Times a Day As Needed (back pain and neuropathy)., Disp: 90 capsule, Rfl: 2    insulin detemir (LEVEMIR) 100 UNIT/ML injection, Inject 5 Units under the skin into the appropriate area as directed Daily. (Patient not taking: Reported on 10/19/2023), Disp: , Rfl: 12    lidocaine (LIDODERM) 5 %, Place 1 patch on the skin as directed by provider Daily., Disp: 30 patch, Rfl: 1    traMADol (ULTRAM)  50 MG tablet, Take 1 tablet by mouth Every 6 (Six) Hours As Needed for Moderate Pain., Disp: 28 tablet, Rfl: 2       Plan of care reviewed with the patient at the conclusion of today's visit.  Education was provided regarding diagnosis, management, and any prescribed or recommended OTC medications.  Patient verbalized understanding of and agreement with management plan.     Return in about 3 months (around 1/19/2024), or if symptoms worsen or fail to improve, for Follow-up.      Transcribed from ambient dictation for HOWIE Mckenna by HOWIE Mckenna.  10/19/23   16:17 EDT    Patient or patient representative verbalized consent to the visit recording.  I have personally performed the services described in this document as transcribed by the above individual, and it is both accurate and complete.    Transcribed from ambient dictation for HOWIE Mckenna by José Miguel Guerrero.  10/19/23   20:41 EDT    Patient or patient representative verbalized consent to the visit recording.  I have personally performed the services described in this document as transcribed by the above individual, and it is both accurate and complete.

## 2023-10-20 ENCOUNTER — TELEPHONE (OUTPATIENT)
Dept: INTERNAL MEDICINE | Facility: CLINIC | Age: 63
End: 2023-10-20
Payer: COMMERCIAL

## 2023-10-20 NOTE — TELEPHONE ENCOUNTER
Caller: Thelma Michael    Relationship: Self    Best call back number: [phone]     What is the medical concern/diagnosis: ASSISTANCE WITH ACTIVITIES OF DAILY LIVING-BROKEN LEFT COLLAR BONE    What specialty or service is being requested: HOME HEALTH, PHYSICAL AND OCCUPATIONAL THERAPY    What is the provider, practice or medical service name:     What is the office location:     What is the office phone number:     Any additional details: PATIENT IS REQUESTING THESE SERVICES AS SOON AS POSSIBLE

## 2023-10-23 ENCOUNTER — TELEPHONE (OUTPATIENT)
Dept: INTERNAL MEDICINE | Facility: CLINIC | Age: 63
End: 2023-10-23

## 2023-10-23 DIAGNOSIS — E11.42 TYPE 2 DIABETES MELLITUS WITH DIABETIC POLYNEUROPATHY, WITH LONG-TERM CURRENT USE OF INSULIN: ICD-10-CM

## 2023-10-23 DIAGNOSIS — S42.035D CLOSED NONDISPLACED FRACTURE OF ACROMIAL END OF LEFT CLAVICLE WITH ROUTINE HEALING, SUBSEQUENT ENCOUNTER: ICD-10-CM

## 2023-10-23 DIAGNOSIS — I10 PRIMARY HYPERTENSION: ICD-10-CM

## 2023-10-23 DIAGNOSIS — R29.898 UPPER EXTREMITY WEAKNESS: ICD-10-CM

## 2023-10-23 DIAGNOSIS — E11.42 DIABETIC PERIPHERAL NEUROPATHY: ICD-10-CM

## 2023-10-23 DIAGNOSIS — Z79.4 TYPE 2 DIABETES MELLITUS WITH DIABETIC POLYNEUROPATHY, WITH LONG-TERM CURRENT USE OF INSULIN: ICD-10-CM

## 2023-10-23 DIAGNOSIS — R29.898 WEAKNESS OF BOTH LOWER EXTREMITIES: Primary | ICD-10-CM

## 2023-10-23 NOTE — TELEPHONE ENCOUNTER
PATIENT CALLED BACK. IT'S DIFFICULT FOR HER TO FIX FOOD, AND SHOWER WITH HER BROKEN SHOULDER. WOULD DR JIMENEZ BE ABLE TO PUT IN A REFERRAL IN ORDER TO GET HELP WITH TRANSPORTATION?

## 2023-10-23 NOTE — TELEPHONE ENCOUNTER
Orders placed for home health with PT, OT, and skilled nursing. Placed case management referral to help with transporation options.

## 2023-10-23 NOTE — TELEPHONE ENCOUNTER
BEHZAD WITH [facility] IS CALL ING IN SHE STATES THAT THEY RECEIVED A REFERRAL FOR THE PATIENT BUT THEY DONT TAKE THE PATIENTS INSURANCE   CALL [phone]

## 2023-10-24 ENCOUNTER — REFERRAL TRIAGE (OUTPATIENT)
Dept: CASE MANAGEMENT | Facility: OTHER | Age: 63
End: 2023-10-24
Payer: COMMERCIAL

## 2023-10-25 ENCOUNTER — PATIENT OUTREACH (OUTPATIENT)
Dept: CASE MANAGEMENT | Facility: OTHER | Age: 63
End: 2023-10-25
Payer: COMMERCIAL

## 2023-10-25 NOTE — OUTREACH NOTE
Social Work Assessment  Questions/Answers      Flowsheet Row Most Recent Value   Referral Source physician   Reason for Consult community resources   Preferred Language English   Advance Care Planning Reviewed no concerns identified   People in Home child(bronson), adult   Current Living Arrangements apartment   Potentially Unsafe Housing Conditions none   In the past 12 months has the electric, gas, oil, or water company threatened to shut off services in your home? No   Primary Care Provided by child(bronson), self   Provides Primary Care For no one   Family Caregiver if Needed child(bronson), adult   Quality of Family Relationships supportive, involved   Employment Status retired   Source of Income social security   Application for Public Assistance not applied   Medications independent   Meal Preparation independent   Housekeeping independent   Laundry independent   Shopping independent        SDOH updated and reviewed with the patient during this program:  Financial Resource Strain: Low Risk  (9/6/2023)    Overall Financial Resource Strain (CARDIA)     Difficulty of Paying Living Expenses: Not hard at all      Food Insecurity: No Food Insecurity (9/6/2023)    Hunger Vital Sign     Worried About Running Out of Food in the Last Year: Never true     Ran Out of Food in the Last Year: Never true      Transportation Needs: No Transportation Needs (9/6/2023)    PRAPARE - Transportation     Lack of Transportation (Medical): No     Lack of Transportation (Non-Medical): No      Housing Stability: Not At Risk (10/25/2023)    Housing Stability     Current Living Arrangements: apartment     Potentially Unsafe Housing Conditions: none      Continuing Care   [facility]   [facility] [address], [place] [address]    Phone: [phone]    Resource for: Transportation Needs   Patient Outreach    SW contacted pt after receiving a provider referral. Pt reports that she needs assistance  with transportation. Her children are able to help sometimes, but they all work so they are not always available. SW provided education on medicaid transport. She needs help getting down the stairs, but she reports a neighbor could help her with that. SW offered to attempt to get her approved and schedule upcoming PT appointments starting next week. She already has transportation arranged for her appointments this week.   Care Coordination    LUANA contacted [facility]. Pt is already in the system. LUANA scheduled rides for physical therapy on 11/1 and 11/6.  LUANA updated pt and provided instructions for scheduling future rides. Pt denies other needs. SW encouraged her to contact  if additional needs arise.    Sarah ALEJANDRO -   Ambulatory Case Management    10/25/2023, 11:47 EDT

## 2023-10-27 ENCOUNTER — OFFICE VISIT (OUTPATIENT)
Dept: ENDOCRINOLOGY | Facility: CLINIC | Age: 63
End: 2023-10-27
Payer: COMMERCIAL

## 2023-10-27 VITALS
HEIGHT: 68 IN | SYSTOLIC BLOOD PRESSURE: 118 MMHG | OXYGEN SATURATION: 99 % | HEART RATE: 68 BPM | WEIGHT: 133 LBS | DIASTOLIC BLOOD PRESSURE: 80 MMHG | BODY MASS INDEX: 20.16 KG/M2

## 2023-10-27 DIAGNOSIS — E10.65 UNCONTROLLED TYPE 1 DIABETES MELLITUS WITH HYPERGLYCEMIA: Primary | ICD-10-CM

## 2023-10-27 LAB
EXPIRATION DATE: ABNORMAL
GLUCOSE BLDC GLUCOMTR-MCNC: 139 MG/DL (ref 70–130)
Lab: ABNORMAL

## 2023-10-27 PROCEDURE — 1159F MED LIST DOCD IN RCRD: CPT | Performed by: INTERNAL MEDICINE

## 2023-10-27 PROCEDURE — 3046F HEMOGLOBIN A1C LEVEL >9.0%: CPT | Performed by: INTERNAL MEDICINE

## 2023-10-27 PROCEDURE — 99213 OFFICE O/P EST LOW 20 MIN: CPT | Performed by: INTERNAL MEDICINE

## 2023-10-27 PROCEDURE — 82947 ASSAY GLUCOSE BLOOD QUANT: CPT | Performed by: INTERNAL MEDICINE

## 2023-10-27 PROCEDURE — 3074F SYST BP LT 130 MM HG: CPT | Performed by: INTERNAL MEDICINE

## 2023-10-27 PROCEDURE — 1160F RVW MEDS BY RX/DR IN RCRD: CPT | Performed by: INTERNAL MEDICINE

## 2023-10-27 PROCEDURE — 3079F DIAST BP 80-89 MM HG: CPT | Performed by: INTERNAL MEDICINE

## 2023-10-27 RX ORDER — SUBCUTANEOUS INSULIN PUMP
EACH MISCELLANEOUS
COMMUNITY

## 2023-10-27 RX ORDER — PROCHLORPERAZINE 25 MG/1
SUPPOSITORY RECTAL
COMMUNITY

## 2023-10-27 NOTE — PROGRESS NOTES
"     Office Note      Date: 10/27/2023  Patient Name: Thelma Michael  MRN: [id number]  : 1960    Chief Complaint   Patient presents with    Diabetes     Type II       History of Present Illness:   Thelma Michael is a 62 y.o. female who presents for Diabetes type 1.   Current Rxinsulin in a tandem pump     Bg is checked 288 times per day with dexcom.  Insulin doses are adjusted frequently based upon the readings.  Patient has occasional hypoglycemia.  Patient has been using the system during the last 3 months.       Last A1c:  Hemoglobin A1C   Date Value Ref Range Status   2023 11.10 (H) 4.80 - 5.60 % Final       Changes in health since last visit: was in the hospital with coli sepsis . Last eye exam up to date .    Subjective              Review of Systems:   Review of Systems   Musculoskeletal:  Positive for joint swelling.       The following portions of the patient's history were reviewed and updated as appropriate: allergies, current medications, past family history, past medical history, past social history, past surgical history, and problem list.    Objective     Visit Vitals  /80 (BP Location: Left arm, Patient Position: Sitting, Cuff Size: Adult)   Pulse 68   Ht 172.7 cm (68\") Comment: Per patient   Wt 60.3 kg (133 lb) Comment: Per patient.  pt in wheelchair   SpO2 99%   BMI 20.22 kg/m²           Physical Exam:  Physical Exam  Vitals reviewed.   Constitutional:       Appearance: Normal appearance.   Neurological:      Mental Status: She is alert.   Psychiatric:         Mood and Affect: Mood normal.         Thought Content: Thought content normal.         Judgment: Judgment normal.          Assessment / Plan      Assessment & Plan:  Problem List Items Addressed This Visit          Other    Uncontrolled type 1 diabetes mellitus with hyperglycemia - Primary    Overview     dx'd age 38 .          Current Assessment & Plan     A1c was high but her readings are better now. As long " she stays on the pump she will do well          Relevant Medications    Insulin Lispro (humaLOG) 100 UNIT/ML injection    Other Relevant Orders    POC Glucose, Blood (Completed)        Gerson Ochoa MD   10/27/2023

## 2023-11-01 ENCOUNTER — TELEPHONE (OUTPATIENT)
Dept: INTERNAL MEDICINE | Facility: CLINIC | Age: 63
End: 2023-11-01
Payer: COMMERCIAL

## 2023-11-01 DIAGNOSIS — R29.898 WEAKNESS OF BOTH LOWER EXTREMITIES: Primary | ICD-10-CM

## 2023-11-01 DIAGNOSIS — R29.898 UPPER EXTREMITY WEAKNESS: ICD-10-CM

## 2023-11-01 NOTE — TELEPHONE ENCOUNTER
PHYSICAL THERAPY AT [facility] CALLED AND ASKED IF MARKY COULD WRITE AN ORDER FOR PHYSICAL THERAPY FOR STRENGTHENING AND ENDURANCE , THE PT'S LOWER BODY IS REALLY WEAK, ONCE ORDERED I'LL FAX [phone] ATTN NADINE COSBY, PLEASE ADVISE.

## 2023-11-02 ENCOUNTER — TELEPHONE (OUTPATIENT)
Dept: INTERNAL MEDICINE | Facility: CLINIC | Age: 63
End: 2023-11-02
Payer: COMMERCIAL

## 2023-11-02 NOTE — TELEPHONE ENCOUNTER
Caller: FLASH GIL    Relationship: Other    Best call back number: [phone]     What is the medical concern/diagnosis: PATIENT WAS IN THE HOSPITAL FOR HYPERTENSIVE EMERGENCY    What specialty or service is being requested:   -HOME HEALTH PHYSICAL THERAPY OR OUTPATIENT PHYSICAL THERAPY   -OCCUPATIONAL THERAPY    What is the provider, practice or medical service name: N/A    What is the office location: N/A    What is the office phone number: N/A    Any additional details: THE FOLLOWING LOCATIONS ARE ALL WITHIN THE PATIENTS NETWORK:      [facility]....[phone]  [facility].... [phone]  [facility]....[phone]  [facility]... [phone]  [facility]...[phone]  [facility]....[phone]

## 2023-11-03 NOTE — TELEPHONE ENCOUNTER
Leighann - I put in this order but she also has order for home health PT being worked on. Not sure if she can have both.

## 2023-11-03 NOTE — TELEPHONE ENCOUNTER
[facility] AND [facility]. WILL SET THE PT UP WITH PHYSICAL THERAPY   .

## 2023-11-09 ENCOUNTER — HOSPITAL ENCOUNTER (OUTPATIENT)
Dept: GENERAL RADIOLOGY | Facility: HOSPITAL | Age: 63
Discharge: HOME | End: 2023-11-09
Admitting: NURSE PRACTITIONER
Payer: COMMERCIAL

## 2023-11-09 ENCOUNTER — OFFICE VISIT (OUTPATIENT)
Dept: INTERNAL MEDICINE | Facility: CLINIC | Age: 63
End: 2023-11-09
Payer: COMMERCIAL

## 2023-11-09 VITALS
HEIGHT: 68 IN | HEART RATE: 74 BPM | DIASTOLIC BLOOD PRESSURE: 66 MMHG | OXYGEN SATURATION: 93 % | WEIGHT: 142 LBS | BODY MASS INDEX: 21.52 KG/M2 | RESPIRATION RATE: 16 BRPM | SYSTOLIC BLOOD PRESSURE: 112 MMHG | TEMPERATURE: 97.6 F

## 2023-11-09 DIAGNOSIS — M25.511 ACUTE PAIN OF RIGHT SHOULDER: ICD-10-CM

## 2023-11-09 DIAGNOSIS — S42.035D CLOSED NONDISPLACED FRACTURE OF ACROMIAL END OF LEFT CLAVICLE WITH ROUTINE HEALING, SUBSEQUENT ENCOUNTER: ICD-10-CM

## 2023-11-09 DIAGNOSIS — E10.65 UNCONTROLLED TYPE 1 DIABETES MELLITUS WITH HYPERGLYCEMIA: ICD-10-CM

## 2023-11-09 DIAGNOSIS — M25.511 ACUTE PAIN OF RIGHT SHOULDER: Primary | ICD-10-CM

## 2023-11-09 PROCEDURE — 73030 X-RAY EXAM OF SHOULDER: CPT

## 2023-11-09 RX ORDER — TRAMADOL HYDROCHLORIDE 50 MG/1
50 TABLET ORAL 2 TIMES DAILY PRN
Qty: 60 TABLET | Refills: 2 | Status: SHIPPED | OUTPATIENT
Start: 2023-11-09

## 2023-11-09 NOTE — PROGRESS NOTES
Subjective   Chief Complaint   Patient presents with    Nausea    Shoulder Pain     Right x 2 months  (since fall)      Thelma Michael is a 62 y.o. female.     The patient is here today for pain in her right shoulder. The patient recently fell while on vacation and had a fracture of the left clavicle and has been wearing an arm sling. She has been getting home health physical therapy and occupational therapy. She also experiences chronic extremity weakness and history of uncontrolled diabetes with blood sugars fluctuating very high and very low that has led to multiple hospitalizations.    The patient underwent physical therapy 3 times, and they are working her whole body and reports effectiveness. She can only raise her right upper extremity. She is consulting Dr. Malagon at [facility] for her shoulder that has been bothering her since the middle of 09/2023. She denies the ability to extend her upper extremity all the way, which bothers her. She denies any swelling. She cannot wait to get back to her normal self so she can play some disc golf again. She is retired. She takes Tylenol extra strength and ibuprofen. She is out of tramadol, but she is not sure when the insurance will pay for it again, she found it effective.    The patient's blood glucose levels were within the normal range. She consulted with her endocrinologist last week.    I have reviewed the following portions of the patient's history and confirmed they are accurate: allergies, current medications, past family history, past medical history, past social history, past surgical history, and problem list    Review of Systems  Pertinent items are noted in HPI.     Current Outpatient Medications on File Prior to Visit   Medication Sig    acetaminophen (TYLENOL) 325 MG tablet Take 2 tablets by mouth Every 4 (Four) Hours As Needed for Mild Pain .    albuterol sulfate  (90 Base) MCG/ACT inhaler Inhale 2 puffs Every 4  (Four) Hours As Needed for Wheezing.    amLODIPine (NORVASC) 10 MG tablet Take 1 tablet by mouth Daily.    atorvastatin (Lipitor) 80 MG tablet Take 1 tablet by mouth Daily.    Blood Glucose Monitoring Suppl (FreeStyle Lite) w/Device kit 1 Device 3 (Three) Times a Day.    calcium carbonate (TUMS) 500 MG chewable tablet Chew 1,000 mg 3 (Three) Times a Day As Needed for Indigestion or Heartburn.    carvedilol (COREG) 12.5 MG tablet Take 1 tablet by mouth 2 (Two) Times a Day With Meals.    Continuous Blood Gluc Transmit (Dexcom G6 Transmitter) misc Use.    famotidine (PEPCID) 20 MG tablet Take 1 tablet by mouth Every Morning.    ferrous sulfate 325 (65 Fe) MG tablet Take with Vitamin C or Juice (Patient taking differently: Daily With Breakfast. Take with Vitamin C or Juice)    gabapentin (NEURONTIN) 100 MG capsule Take 1 capsule by mouth 3 (Three) Times a Day As Needed (back pain and neuropathy).    glucose blood test strip Check blood sugar 5 times daily as needed.    Insulin Infusion Pump (T:slim X2 Insulin Pump) device Use.    Insulin Lispro (humaLOG) 100 UNIT/ML injection Inject 2-7 Units under the skin into the appropriate area as directed 4 (Four) Times a Day Before Meals & at Bedtime.    lactobacillus acidophilus (RISAQUAD) capsule capsule Take 1 capsule by mouth Daily.    lidocaine (LIDODERM) 5 % Place 1 patch on the skin as directed by provider Daily.    losartan (COZAAR) 50 MG tablet Take 1 tablet by mouth Daily.    melatonin 5 MG tablet tablet Take 1 tablet by mouth At Night As Needed (sleep).    mirtazapine (REMERON) 7.5 MG tablet Take 1 tablet by mouth Every Night.    Multivitamin tablet tablet Take 1 tablet by mouth Daily.    prochlorperazine (COMPAZINE) 5 MG tablet Take 1 tablet by mouth Every 6 (Six) Hours As Needed for Nausea or Vomiting.    rivaroxaban (XARELTO) 15 MG tablet Take 1 tablet by mouth Daily With Dinner    terazosin (HYTRIN) 1 MG capsule Take 1 capsule by mouth Every Night.    vitamin D  "(ERGOCALCIFEROL) 1.25 MG (67993 UT) capsule capsule Take 1 capsule by mouth 1 (One) Time Per Week.    [DISCONTINUED] traMADol (ULTRAM) 50 MG tablet Take 1 tablet by mouth Every 6 (Six) Hours As Needed for Moderate Pain.    [DISCONTINUED] Insulin Glargine (BASAGLAR KWIKPEN) 100 UNIT/ML injection pen Inject 10 Units under the skin into the appropriate area as directed Every Night.     No current facility-administered medications on file prior to visit.       Objective   Vitals:    11/09/23 1037   BP: 112/66   Pulse: 74   Resp: 16   Temp: 97.6 °F (36.4 °C)   TempSrc: Tympanic   SpO2: 93%   Weight: 64.4 kg (142 lb)   Height: 172.7 cm (68\")     Body mass index is 21.59 kg/m².    Physical Exam  Vitals reviewed.   Constitutional:       Appearance: Normal appearance. She is well-developed.   HENT:      Head: Normocephalic and atraumatic.      Nose: Nose normal.   Eyes:      General: Lids are normal.      Conjunctiva/sclera: Conjunctivae normal.      Pupils: Pupils are equal, round, and reactive to light.   Neck:      Thyroid: No thyromegaly.      Trachea: Trachea normal.   Pulmonary:      Effort: Pulmonary effort is normal. No respiratory distress.   Musculoskeletal:      Right shoulder: Tenderness present. Decreased range of motion.      Comments: Left clavicular swelling and pain.    Skin:     General: Skin is warm and dry.   Neurological:      Mental Status: She is alert and oriented to person, place, and time.      GCS: GCS eye subscore is 4. GCS verbal subscore is 5. GCS motor subscore is 6.   Psychiatric:         Attention and Perception: Attention normal.         Mood and Affect: Mood normal.         Speech: Speech normal.         Behavior: Behavior normal. Behavior is cooperative.         Assessment & Plan   Problem List Items Addressed This Visit       Uncontrolled type 1 diabetes mellitus with hyperglycemia    Overview     dx'd age 38 .          Clavicle fracture    Relevant Medications    traMADol (ULTRAM) 50 MG " tablet     Other Visit Diagnoses       Acute pain of right shoulder    -  Primary    Relevant Medications    traMADol (ULTRAM) 50 MG tablet    Other Relevant Orders    XR Shoulder 2+ View Right         1. Acute pain of right shoulder  - New, unstable.  - X-ray ordered.  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  - Continue Tylenol and increasing tramadol to twice daily.    2. Closed nondisplaced fracture of the left clavicle  - New, stable.  - Increase tramadol to twice daily.  - Patient will refrain from NSAIDs due to chronic kidney disease and she is currently chronically anticoagulated.  - Continue Tylenol as needed.    3. Uncontrolled type 1 diabetes mellitus  - Chronic, unstable.  - Patient will continue with insulin pump and Humalog pump and following up with endocrinology.  - Patient will follow diabetic diet.      Current Outpatient Medications:     acetaminophen (TYLENOL) 325 MG tablet, Take 2 tablets by mouth Every 4 (Four) Hours As Needed for Mild Pain ., Disp: , Rfl:     albuterol sulfate  (90 Base) MCG/ACT inhaler, Inhale 2 puffs Every 4 (Four) Hours As Needed for Wheezing., Disp: 18 g, Rfl: 5    amLODIPine (NORVASC) 10 MG tablet, Take 1 tablet by mouth Daily., Disp: 30 tablet, Rfl: 0    atorvastatin (Lipitor) 80 MG tablet, Take 1 tablet by mouth Daily., Disp: 90 tablet, Rfl: 1    Blood Glucose Monitoring Suppl (FreeStyle Lite) w/Device kit, 1 Device 3 (Three) Times a Day., Disp: 1 kit, Rfl: 0    calcium carbonate (TUMS) 500 MG chewable tablet, Chew 1,000 mg 3 (Three) Times a Day As Needed for Indigestion or Heartburn., Disp: , Rfl:     carvedilol (COREG) 12.5 MG tablet, Take 1 tablet by mouth 2 (Two) Times a Day With Meals., Disp: , Rfl:     Continuous Blood Gluc Transmit (Dexcom G6 Transmitter) misc, Use., Disp: , Rfl:     famotidine (PEPCID) 20 MG tablet, Take 1 tablet by mouth Every Morning., Disp: , Rfl:     ferrous sulfate 325 (65 Fe) MG tablet, Take  with Vitamin C or Juice (Patient taking differently: Daily With Breakfast. Take with Vitamin C or Juice), Disp: 30 tablet, Rfl: 0    gabapentin (NEURONTIN) 100 MG capsule, Take 1 capsule by mouth 3 (Three) Times a Day As Needed (back pain and neuropathy)., Disp: 90 capsule, Rfl: 2    glucose blood test strip, Check blood sugar 5 times daily as needed., Disp: 200 each, Rfl: 5    Insulin Infusion Pump (T:slim X2 Insulin Pump) device, Use., Disp: , Rfl:     Insulin Lispro (humaLOG) 100 UNIT/ML injection, Inject 2-7 Units under the skin into the appropriate area as directed 4 (Four) Times a Day Before Meals & at Bedtime., Disp: 10 mL, Rfl: 12    lactobacillus acidophilus (RISAQUAD) capsule capsule, Take 1 capsule by mouth Daily., Disp: 30 capsule, Rfl: 0    lidocaine (LIDODERM) 5 %, Place 1 patch on the skin as directed by provider Daily., Disp: 30 patch, Rfl: 1    losartan (COZAAR) 50 MG tablet, Take 1 tablet by mouth Daily., Disp: 90 tablet, Rfl: 0    melatonin 5 MG tablet tablet, Take 1 tablet by mouth At Night As Needed (sleep)., Disp: , Rfl:     mirtazapine (REMERON) 7.5 MG tablet, Take 1 tablet by mouth Every Night., Disp: 30 tablet, Rfl: 5    Multivitamin tablet tablet, Take 1 tablet by mouth Daily., Disp: 30 tablet, Rfl: 0    prochlorperazine (COMPAZINE) 5 MG tablet, Take 1 tablet by mouth Every 6 (Six) Hours As Needed for Nausea or Vomiting., Disp: , Rfl:     rivaroxaban (XARELTO) 15 MG tablet, Take 1 tablet by mouth Daily With Dinner, Disp: 42 tablet, Rfl: 2    terazosin (HYTRIN) 1 MG capsule, Take 1 capsule by mouth Every Night., Disp: 30 capsule, Rfl: 0    traMADol (ULTRAM) 50 MG tablet, Take 1 tablet by mouth 2 (Two) Times a Day As Needed for Moderate Pain., Disp: 60 tablet, Rfl: 2    vitamin D (ERGOCALCIFEROL) 1.25 MG (71505 UT) capsule capsule, Take 1 capsule by mouth 1 (One) Time Per Week., Disp: 4 capsule, Rfl: 2       Plan of care reviewed with the patient at the conclusion of today's visit.  Education  was provided regarding diagnosis, management, and any prescribed or recommended OTC medications.  Patient verbalized understanding of and agreement with management plan.     Return if symptoms worsen or fail to improve.      Transcribed from ambient dictation for HOWIE Mckenna by HOWIE Mckenna.  11/09/23   11:00 EST    Patient or patient representative verbalized consent to the visit recording.  I have personally performed the services described in this document as transcribed by the above individual, and it is both accurate and complete.    Transcribed from ambient dictation for HOWIE Mckenna by Estepahnia Carey.  11/09/23   13:42 EST    Patient or patient representative verbalized consent to the visit recording.  I have personally performed the services described in this document as transcribed by the above individual, and it is both accurate and complete.

## 2023-11-17 ENCOUNTER — TELEPHONE (OUTPATIENT)
Dept: INTERNAL MEDICINE | Facility: CLINIC | Age: 63
End: 2023-11-17
Payer: COMMERCIAL

## 2023-11-17 NOTE — TELEPHONE ENCOUNTER
Caller: Thelma Michael    Relationship: Self    Best call back number: [phone]     What is the best time to reach you: ANYTIME    Who are you requesting to speak with (clinical staff, provider,  specific staff member): CLINICAL    Do you know the name of the person who called: NA    What was the call regarding: PATIENT IS NEEDING TO KNOW WITH HER TYPE 1 DIABETES IS IT OK TO TAKE TYLENOL? WILL IT HURT HER KIDNEYS? PATIENT HAS A BROKEN SHOULDER AND WAS GIVEN TYLENOL FOR PAIN. PLEASE CALL TO DISCUSS.     Is it okay if the provider responds through MyChart: NO

## 2023-11-28 ENCOUNTER — TELEPHONE (OUTPATIENT)
Dept: INTERNAL MEDICINE | Facility: CLINIC | Age: 63
End: 2023-11-28
Payer: COMMERCIAL

## 2023-11-28 DIAGNOSIS — Z86.39 HISTORY OF UNCONTROLLED DIABETES: ICD-10-CM

## 2023-11-28 DIAGNOSIS — L97.529 ULCER OF LEFT FOOT, UNSPECIFIED ULCER STAGE: Primary | ICD-10-CM

## 2023-11-28 NOTE — TELEPHONE ENCOUNTER
Pt called and stated that she would like a Referral to podiatry. Pt stated that she is a type 1 diabetic and has a sore on the bottom of her left foot. Pt does not want to go to [facility]. Please call pt when this referral is sent.

## 2023-11-30 DIAGNOSIS — M25.511 ACUTE PAIN OF RIGHT SHOULDER: Primary | ICD-10-CM

## 2023-12-05 ENCOUNTER — OFFICE VISIT (OUTPATIENT)
Dept: CARDIOLOGY | Facility: HOSPITAL | Age: 63
End: 2023-12-05
Payer: COMMERCIAL

## 2023-12-05 VITALS
TEMPERATURE: 98.2 F | DIASTOLIC BLOOD PRESSURE: 95 MMHG | HEART RATE: 92 BPM | SYSTOLIC BLOOD PRESSURE: 160 MMHG | OXYGEN SATURATION: 100 % | HEIGHT: 68 IN | WEIGHT: 145.13 LBS | BODY MASS INDEX: 22 KG/M2 | RESPIRATION RATE: 20 BRPM

## 2023-12-05 NOTE — PROGRESS NOTES
"Chief Complaint  Atrial Fibrillation and Follow-up    Subjective    History of Present Illness {  Problem List  Visit  Diagnosis   Encounters  Notes  Medications  Labs  Result Review Imaging  Media :23}       History of Present Illness   62-year-old female presents the office today for ongoing evaluation of her atrial fibrillation/atrial flutter.  Patient is anticoagulated with Xarelto and denies any signs and symptoms of bleeding.  Reports no A-fib episodes recently.  She reports that heart rates have been 70s to 80s.  Patient has been hospitalized frequently over the last year for gastroparesis, nausea and vomiting.  Patient now has gastric stimulator in place.  Blood pressure had been well controlled  Currently denies chest pain, dyspnea, dizziness, palpitations, presyncope or syncope.  Objective     Vital Signs:   Vitals:    12/05/23 1424 12/05/23 1500   BP: (!) 189/91 160/95   BP Location: Right arm Left arm   Patient Position: Sitting Sitting   Cuff Size: Small Adult    Pulse: 92    Resp: 20    Temp: 98.2 °F (36.8 °C)    TempSrc: Temporal    SpO2: 100%    Weight: 65.8 kg (145 lb 2 oz)    Height: 172.7 cm (68\")        Body mass index is 22.07 kg/m².  Physical Exam  Vitals and nursing note reviewed.   Constitutional:       Appearance: Normal appearance.   HENT:      Head: Normocephalic.   Eyes:      Pupils: Pupils are equal, round, and reactive to light.   Cardiovascular:      Rate and Rhythm: Normal rate and regular rhythm.      Pulses: Normal pulses.      Heart sounds: Normal heart sounds. No murmur heard.  Pulmonary:      Effort: Pulmonary effort is normal.      Breath sounds: Normal breath sounds.   Abdominal:      General: Bowel sounds are normal.      Palpations: Abdomen is soft.   Musculoskeletal:         General: Normal range of motion.      Cervical back: Normal range of motion.      Right lower leg: No edema.      Left lower leg: No edema.   Skin:     General: Skin is warm and dry.      " Capillary Refill: Capillary refill takes less than 2 seconds.   Neurological:      Mental Status: She is alert and oriented to person, place, and time.   Psychiatric:         Mood and Affect: Mood normal.         Thought Content: Thought content normal.              Result Review  Data Reviewed:{ Labs  Result Review  Imaging  Med Tab  Media :23}   Lab Results   Component Value Date    GLUCOSE 234 (H) 09/17/2023    CALCIUM 8.7 09/17/2023     09/17/2023    K 4.3 09/17/2023    CO2 24.0 09/17/2023     (H) 09/17/2023    BUN 17 09/17/2023    CREATININE 1.11 (H) 09/17/2023    EGFR 55.6 (L) 09/17/2023    BCR 15.3 09/17/2023    ANIONGAP 8.0 09/17/2023     Lab Results   Component Value Date    WBC 8.52 09/17/2023    HGB 9.4 (L) 09/17/2023    HCT 29.2 (L) 09/17/2023    MCV 91.5 09/17/2023     (H) 09/17/2023       Assessment and Plan {CC Problem List  Visit Diagnosis  ROS  Review (Popup)  Health Maintenance  Quality  BestPractice  Medications  SmartSets  SnapShot Encounters  Media :23}   1. Atrial fibrillation and flutter  CHADS-VASc Risk Assessment              6 Total Score    1 Hypertension    1 DM    2 PRIOR STROKE/TIA/THROMBO    1 Age 65-74    1 Sex: Female        Criteria that do not apply:    CHF    Age >/= 75    Vascular Disease          Anticoagulated with Xarelto and denies any signs and symptoms of bleeding  Stable on carvedilol          2. Mixed hyperlipidemia  Diet controlled      3. Primary hypertension  Stable on amlodipine, carvedilol, losartan, terazosin      4. PFO (patent foramen ovale)  Stable        Follow Up {Instructions Charge Capture  Follow-up Communications :23}   Return in about 17 days (around 12/22/2023) for Office visit, HTN, AFIB.    Patient was given instructions and counseling regarding her condition or for health maintenance advice. Please see specific information pulled into the AVS if appropriate.  Patient was instructed to call the [facility] with any questions, concerns, or worsening symptoms.

## 2023-12-12 ENCOUNTER — TELEPHONE (OUTPATIENT)
Dept: INTERNAL MEDICINE | Facility: CLINIC | Age: 63
End: 2023-12-12
Payer: COMMERCIAL

## 2023-12-12 NOTE — TELEPHONE ENCOUNTER
PT CALLED THE OFFICE TO CHECK HER PODIATRY REFERRAL, PT WAS ADVISED SHE HAS BEEN REFERRED T [facility] AND SHE COULD CALL THEM [phone]  TO SCHEDULE AN APPT. KORINA

## 2023-12-13 ENCOUNTER — TELEPHONE (OUTPATIENT)
Dept: CARDIOLOGY | Facility: HOSPITAL | Age: 63
End: 2023-12-13
Payer: COMMERCIAL

## 2023-12-13 NOTE — TELEPHONE ENCOUNTER
----- Message from HOWIE Noble sent at 12/13/2023  2:29 PM EST -----  Increase losartan to 100 mg daily ( 2 tablets)  ----- Message -----  From: Yara Rome MA  Sent: 12/13/2023   2:17 PM EST  To: HOWIE Noble    Pt called and stated while she was at [facility] out patient rehab she was doing arm exercises and got dizzy and lightheaded and she had them check her blood pressure and it was 220/115

## 2024-01-01 ENCOUNTER — APPOINTMENT (OUTPATIENT)
Dept: GENERAL RADIOLOGY | Facility: HOSPITAL | Age: 64
DRG: 023 | End: 2024-01-01
Payer: MEDICARE

## 2024-01-01 ENCOUNTER — HOSPITAL ENCOUNTER (INPATIENT)
Facility: HOSPITAL | Age: 64
LOS: 7 days | DRG: 023 | End: 2024-02-12
Attending: EMERGENCY MEDICINE | Admitting: INTERNAL MEDICINE
Payer: MEDICARE

## 2024-01-01 ENCOUNTER — APPOINTMENT (OUTPATIENT)
Dept: CT IMAGING | Facility: HOSPITAL | Age: 64
DRG: 023 | End: 2024-01-01
Payer: MEDICARE

## 2024-01-01 ENCOUNTER — APPOINTMENT (OUTPATIENT)
Dept: CARDIOLOGY | Facility: HOSPITAL | Age: 64
DRG: 023 | End: 2024-01-01
Payer: MEDICARE

## 2024-01-01 ENCOUNTER — APPOINTMENT (OUTPATIENT)
Dept: MRI IMAGING | Facility: HOSPITAL | Age: 64
DRG: 023 | End: 2024-01-01
Payer: MEDICARE

## 2024-01-01 ENCOUNTER — APPOINTMENT (OUTPATIENT)
Dept: NEUROLOGY | Facility: HOSPITAL | Age: 64
DRG: 023 | End: 2024-01-01
Payer: MEDICARE

## 2024-01-01 VITALS
HEART RATE: 107 BPM | OXYGEN SATURATION: 92 % | SYSTOLIC BLOOD PRESSURE: 146 MMHG | DIASTOLIC BLOOD PRESSURE: 58 MMHG | HEIGHT: 68 IN | TEMPERATURE: 100.1 F | RESPIRATION RATE: 30 BRPM | BODY MASS INDEX: 23.96 KG/M2 | WEIGHT: 158.07 LBS

## 2024-01-01 DIAGNOSIS — I63.511 CEREBROVASCULAR ACCIDENT (CVA) DUE TO OCCLUSION OF RIGHT MIDDLE CEREBRAL ARTERY: Primary | ICD-10-CM

## 2024-01-01 DIAGNOSIS — R47.1 DYSARTHRIA: ICD-10-CM

## 2024-01-01 DIAGNOSIS — R13.10 DYSPHAGIA, UNSPECIFIED TYPE: ICD-10-CM

## 2024-01-01 DIAGNOSIS — R41.841 COGNITIVE COMMUNICATION DEFICIT: ICD-10-CM

## 2024-01-01 LAB
ALBUMIN SERPL-MCNC: 2.1 G/DL (ref 3.5–5.2)
ALBUMIN SERPL-MCNC: 2.4 G/DL (ref 3.5–5.2)
ALBUMIN SERPL-MCNC: 4 G/DL (ref 3.5–5.2)
ALBUMIN/GLOB SERPL: 1.3 G/DL
ALP SERPL-CCNC: 119 U/L (ref 39–117)
ALP SERPL-CCNC: 127 U/L (ref 39–117)
ALP SERPL-CCNC: 146 U/L (ref 39–117)
ALT SERPL W P-5'-P-CCNC: 13 U/L (ref 1–33)
ALT SERPL W P-5'-P-CCNC: 30 U/L (ref 1–33)
ALT SERPL W P-5'-P-CCNC: 68 U/L (ref 1–33)
AMPHET+METHAMPHET UR QL: NEGATIVE
AMPHETAMINES UR QL: NEGATIVE
ANION GAP SERPL CALCULATED.3IONS-SCNC: 11 MMOL/L (ref 5–15)
ANION GAP SERPL CALCULATED.3IONS-SCNC: 11 MMOL/L (ref 5–15)
ANION GAP SERPL CALCULATED.3IONS-SCNC: 12 MMOL/L (ref 5–15)
ANION GAP SERPL CALCULATED.3IONS-SCNC: 13 MMOL/L (ref 5–15)
ANION GAP SERPL CALCULATED.3IONS-SCNC: 13 MMOL/L (ref 5–15)
ANION GAP SERPL CALCULATED.3IONS-SCNC: 15 MMOL/L (ref 5–15)
ANION GAP SERPL CALCULATED.3IONS-SCNC: 15 MMOL/L (ref 5–15)
ANION GAP SERPL CALCULATED.3IONS-SCNC: 17 MMOL/L (ref 5–15)
ANION GAP SERPL CALCULATED.3IONS-SCNC: 18 MMOL/L (ref 5–15)
ANION GAP SERPL CALCULATED.3IONS-SCNC: 19 MMOL/L (ref 5–15)
APTT PPP: 27.3 SECONDS (ref 22–39)
ARTERIAL PATENCY WRIST A: ABNORMAL
ARTERIAL PATENCY WRIST A: POSITIVE
AST SERPL-CCNC: 13 U/L (ref 1–32)
AST SERPL-CCNC: 160 U/L (ref 1–32)
AST SERPL-CCNC: 53 U/L (ref 1–32)
ATMOSPHERIC PRESS: ABNORMAL MM[HG]
B-OH-BUTYR SERPL-SCNC: 0.1 MMOL/L (ref 0.02–0.27)
B-OH-BUTYR SERPL-SCNC: 0.16 MMOL/L (ref 0.02–0.27)
BACTERIA SPEC RESP CULT: NORMAL
BACTERIA UR QL AUTO: NORMAL /HPF
BARBITURATES UR QL SCN: NEGATIVE
BASE EXCESS BLDA CALC-SCNC: -13.1 MMOL/L (ref 0–2)
BASE EXCESS BLDA CALC-SCNC: -2.3 MMOL/L (ref 0–2)
BASE EXCESS BLDA CALC-SCNC: -3.7 MMOL/L (ref 0–2)
BASE EXCESS BLDA CALC-SCNC: -3.8 MMOL/L (ref 0–2)
BASE EXCESS BLDA CALC-SCNC: -4.4 MMOL/L (ref 0–2)
BASE EXCESS BLDA CALC-SCNC: -5.8 MMOL/L (ref 0–2)
BASE EXCESS BLDA CALC-SCNC: -6.8 MMOL/L (ref 0–2)
BASE EXCESS BLDV CALC-SCNC: -5.2 MMOL/L (ref -2–2)
BASOPHILS # BLD AUTO: 0.03 10*3/MM3 (ref 0–0.2)
BASOPHILS # BLD MANUAL: 0.09 10*3/MM3 (ref 0–0.2)
BASOPHILS NFR BLD AUTO: 0.4 % (ref 0–1.5)
BASOPHILS NFR BLD MANUAL: 1 % (ref 0–1.5)
BDY SITE: ABNORMAL
BENZODIAZ UR QL SCN: NEGATIVE
BH CV ECHO LEFT VENTRICLE MID CAVITARY GRADIENT: 13 MMHG
BH CV ECHO MEAS - AO MAX PG: 18.3 MMHG
BH CV ECHO MEAS - AO MEAN PG: 10.7 MMHG
BH CV ECHO MEAS - AO ROOT DIAM: 2.6 CM
BH CV ECHO MEAS - AO V2 MAX: 214 CM/SEC
BH CV ECHO MEAS - AO V2 VTI: 36.5 CM
BH CV ECHO MEAS - AVA(I,D): 2.29 CM2
BH CV ECHO MEAS - EDV(CUBED): 58 ML
BH CV ECHO MEAS - EDV(MOD-SP2): 57 ML
BH CV ECHO MEAS - EDV(MOD-SP4): 61.6 ML
BH CV ECHO MEAS - EF(MOD-BP): 56.8 %
BH CV ECHO MEAS - EF(MOD-SP2): 52.8 %
BH CV ECHO MEAS - EF(MOD-SP4): 61.5 %
BH CV ECHO MEAS - ESV(CUBED): 7 ML
BH CV ECHO MEAS - ESV(MOD-SP2): 26.9 ML
BH CV ECHO MEAS - ESV(MOD-SP4): 23.7 ML
BH CV ECHO MEAS - FS: 50.6 %
BH CV ECHO MEAS - IVS/LVPW: 1.05 CM
BH CV ECHO MEAS - IVSD: 1.49 CM
BH CV ECHO MEAS - LA DIMENSION: 4 CM
BH CV ECHO MEAS - LAT PEAK E' VEL: 9.5 CM/SEC
BH CV ECHO MEAS - LV DIASTOLIC VOL/BSA (35-75): 34.2 CM2
BH CV ECHO MEAS - LV MASS(C)D: 211.6 GRAMS
BH CV ECHO MEAS - LV MAX PG: 9.6 MMHG
BH CV ECHO MEAS - LV MEAN PG: 5 MMHG
BH CV ECHO MEAS - LV SYSTOLIC VOL/BSA (12-30): 13.1 CM2
BH CV ECHO MEAS - LV V1 MAX: 154 CM/SEC
BH CV ECHO MEAS - LV V1 VTI: 27.7 CM
BH CV ECHO MEAS - LVIDD: 3.9 CM
BH CV ECHO MEAS - LVIDS: 1.91 CM
BH CV ECHO MEAS - LVOT AREA: 3 CM2
BH CV ECHO MEAS - LVOT DIAM: 1.96 CM
BH CV ECHO MEAS - LVPWD: 1.42 CM
BH CV ECHO MEAS - MED PEAK E' VEL: 7.9 CM/SEC
BH CV ECHO MEAS - MV A MAX VEL: 108 CM/SEC
BH CV ECHO MEAS - MV DEC TIME: 0.18 SEC
BH CV ECHO MEAS - MV E MAX VEL: 119 CM/SEC
BH CV ECHO MEAS - MV E/A: 1.1
BH CV ECHO MEAS - MV MAX PG: 7.4 MMHG
BH CV ECHO MEAS - MV MEAN PG: 4 MMHG
BH CV ECHO MEAS - MV V2 VTI: 42.7 CM
BH CV ECHO MEAS - MVA(VTI): 1.95 CM2
BH CV ECHO MEAS - PA ACC TIME: 0.1 SEC
BH CV ECHO MEAS - PA V2 MAX: 164.9 CM/SEC
BH CV ECHO MEAS - PI END-D VEL: 111.3 CM/SEC
BH CV ECHO MEAS - RAP SYSTOLE: 8 MMHG
BH CV ECHO MEAS - RVSP: 25 MMHG
BH CV ECHO MEAS - SI(MOD-SP2): 16.7 ML/M2
BH CV ECHO MEAS - SI(MOD-SP4): 21 ML/M2
BH CV ECHO MEAS - SV(LVOT): 83.3 ML
BH CV ECHO MEAS - SV(MOD-SP2): 30.1 ML
BH CV ECHO MEAS - SV(MOD-SP4): 37.9 ML
BH CV ECHO MEAS - TAPSE (>1.6): 2.5 CM
BH CV ECHO MEAS - TR MAX PG: 16.9 MMHG
BH CV ECHO MEAS - TR MAX VEL: 200.8 CM/SEC
BH CV ECHO MEASUREMENTS AVERAGE E/E' RATIO: 13.68
BH CV XLRA - RV BASE: 3.6 CM
BH CV XLRA - RV LENGTH: 6.8 CM
BH CV XLRA - RV MID: 2.8 CM
BH CV XLRA - TDI S': 17.7 CM/SEC
BILIRUB CONJ SERPL-MCNC: 0.2 MG/DL (ref 0–0.3)
BILIRUB CONJ SERPL-MCNC: <0.2 MG/DL (ref 0–0.3)
BILIRUB INDIRECT SERPL-MCNC: 0 MG/DL
BILIRUB INDIRECT SERPL-MCNC: ABNORMAL MG/DL
BILIRUB SERPL-MCNC: 0.2 MG/DL (ref 0–1.2)
BILIRUB SERPL-MCNC: 0.2 MG/DL (ref 0–1.2)
BILIRUB SERPL-MCNC: 0.3 MG/DL (ref 0–1.2)
BILIRUB UR QL STRIP: NEGATIVE
BODY TEMPERATURE: 37
BUN BLDA-MCNC: 35 MG/DL (ref 8–26)
BUN SERPL-MCNC: 35 MG/DL (ref 8–23)
BUN SERPL-MCNC: 37 MG/DL (ref 8–23)
BUN SERPL-MCNC: 42 MG/DL (ref 8–23)
BUN SERPL-MCNC: 44 MG/DL (ref 8–23)
BUN SERPL-MCNC: 50 MG/DL (ref 8–23)
BUN SERPL-MCNC: 53 MG/DL (ref 8–23)
BUN SERPL-MCNC: 55 MG/DL (ref 8–23)
BUN SERPL-MCNC: 59 MG/DL (ref 8–23)
BUN SERPL-MCNC: 62 MG/DL (ref 8–23)
BUN SERPL-MCNC: 69 MG/DL (ref 8–23)
BUN/CREAT SERPL: 17 (ref 7–25)
BUN/CREAT SERPL: 21.1 (ref 7–25)
BUN/CREAT SERPL: 22.1 (ref 7–25)
BUN/CREAT SERPL: 22.6 (ref 7–25)
BUN/CREAT SERPL: 23.2 (ref 7–25)
BUN/CREAT SERPL: 23.2 (ref 7–25)
BUN/CREAT SERPL: 23.5 (ref 7–25)
BUN/CREAT SERPL: 24.5 (ref 7–25)
BUN/CREAT SERPL: 25.2 (ref 7–25)
BUN/CREAT SERPL: 25.6 (ref 7–25)
BUPRENORPHINE SERPL-MCNC: NEGATIVE NG/ML
BURR CELLS BLD QL SMEAR: ABNORMAL
CA-I BLDA-SCNC: 1.17 MMOL/L (ref 1.2–1.32)
CALCIUM SPEC-SCNC: 6.7 MG/DL (ref 8.6–10.5)
CALCIUM SPEC-SCNC: 6.9 MG/DL (ref 8.6–10.5)
CALCIUM SPEC-SCNC: 7.3 MG/DL (ref 8.6–10.5)
CALCIUM SPEC-SCNC: 7.4 MG/DL (ref 8.6–10.5)
CALCIUM SPEC-SCNC: 7.6 MG/DL (ref 8.6–10.5)
CALCIUM SPEC-SCNC: 7.8 MG/DL (ref 8.6–10.5)
CALCIUM SPEC-SCNC: 7.9 MG/DL (ref 8.6–10.5)
CALCIUM SPEC-SCNC: 8.1 MG/DL (ref 8.6–10.5)
CALCIUM SPEC-SCNC: 8.1 MG/DL (ref 8.6–10.5)
CALCIUM SPEC-SCNC: 8.8 MG/DL (ref 8.6–10.5)
CANNABINOIDS SERPL QL: POSITIVE
CHLORIDE BLDA-SCNC: 100 MMOL/L (ref 98–109)
CHLORIDE SERPL-SCNC: 103 MMOL/L (ref 98–107)
CHLORIDE SERPL-SCNC: 113 MMOL/L (ref 98–107)
CHLORIDE SERPL-SCNC: 115 MMOL/L (ref 98–107)
CHLORIDE SERPL-SCNC: 121 MMOL/L (ref 98–107)
CHLORIDE SERPL-SCNC: 123 MMOL/L (ref 98–107)
CHLORIDE SERPL-SCNC: 123 MMOL/L (ref 98–107)
CHLORIDE SERPL-SCNC: 125 MMOL/L (ref 98–107)
CHLORIDE SERPL-SCNC: 125 MMOL/L (ref 98–107)
CHLORIDE SERPL-SCNC: 127 MMOL/L (ref 98–107)
CHLORIDE SERPL-SCNC: 97 MMOL/L (ref 98–107)
CHOLEST SERPL-MCNC: 262 MG/DL (ref 0–200)
CLARITY UR: CLEAR
CO2 BLDA-SCNC: 13.8 MMOL/L (ref 22–33)
CO2 BLDA-SCNC: 17.7 MMOL/L (ref 22–33)
CO2 BLDA-SCNC: 18.8 MMOL/L (ref 22–33)
CO2 BLDA-SCNC: 18.9 MMOL/L (ref 22–33)
CO2 BLDA-SCNC: 19.7 MMOL/L (ref 22–33)
CO2 BLDA-SCNC: 19.9 MMOL/L (ref 22–33)
CO2 BLDA-SCNC: 21.1 MMOL/L (ref 22–33)
CO2 BLDA-SCNC: 22.8 MMOL/L (ref 22–33)
CO2 BLDA-SCNC: 29 MMOL/L (ref 24–29)
CO2 SERPL-SCNC: 15 MMOL/L (ref 22–29)
CO2 SERPL-SCNC: 16 MMOL/L (ref 22–29)
CO2 SERPL-SCNC: 18 MMOL/L (ref 22–29)
CO2 SERPL-SCNC: 19 MMOL/L (ref 22–29)
CO2 SERPL-SCNC: 20 MMOL/L (ref 22–29)
CO2 SERPL-SCNC: 26 MMOL/L (ref 22–29)
COCAINE UR QL: NEGATIVE
COHGB MFR BLD: 0.8 % (ref 0–2)
COHGB MFR BLD: 0.9 % (ref 0–2)
COHGB MFR BLD: 0.9 % (ref 0–2)
COHGB MFR BLD: 1.1 % (ref 0–2)
COHGB MFR BLD: 1.1 % (ref 0–2)
COHGB MFR BLD: 1.2 %
COHGB MFR BLD: 1.3 % (ref 0–2)
COHGB MFR BLD: 1.5 % (ref 0–2)
COLOR UR: YELLOW
CREAT BLDA-MCNC: 2.5 MG/DL (ref 0.6–1.3)
CREAT SERPL-MCNC: 1.64 MG/DL (ref 0.57–1)
CREAT SERPL-MCNC: 1.81 MG/DL (ref 0.57–1)
CREAT SERPL-MCNC: 1.87 MG/DL (ref 0.57–1)
CREAT SERPL-MCNC: 1.95 MG/DL (ref 0.57–1)
CREAT SERPL-MCNC: 2.06 MG/DL (ref 0.57–1)
CREAT SERPL-MCNC: 2.16 MG/DL (ref 0.57–1)
CREAT SERPL-MCNC: 2.18 MG/DL (ref 0.57–1)
CREAT SERPL-MCNC: 2.67 MG/DL (ref 0.57–1)
CREAT SERPL-MCNC: 2.94 MG/DL (ref 0.57–1)
CREAT SERPL-MCNC: 2.98 MG/DL (ref 0.57–1)
CREAT UR-MCNC: 31.3 MG/DL
CREAT UR-MCNC: 49 MG/DL
D-LACTATE SERPL-SCNC: 1.4 MMOL/L (ref 0.5–2)
D-LACTATE SERPL-SCNC: 2.5 MMOL/L (ref 0.5–2)
DACRYOCYTES BLD QL SMEAR: ABNORMAL
DEPRECATED RDW RBC AUTO: 45 FL (ref 37–54)
DEPRECATED RDW RBC AUTO: 45.1 FL (ref 37–54)
DEPRECATED RDW RBC AUTO: 47.8 FL (ref 37–54)
DEPRECATED RDW RBC AUTO: 50.6 FL (ref 37–54)
DEPRECATED RDW RBC AUTO: 51.5 FL (ref 37–54)
DEPRECATED RDW RBC AUTO: 52.7 FL (ref 37–54)
EGFRCR SERPLBLD CKD-EPI 2021: 16.9 ML/MIN/1.73
EGFRCR SERPLBLD CKD-EPI 2021: 17.2 ML/MIN/1.73
EGFRCR SERPLBLD CKD-EPI 2021: 19.3 ML/MIN/1.73
EGFRCR SERPLBLD CKD-EPI 2021: 20.9 ML/MIN/1.73
EGFRCR SERPLBLD CKD-EPI 2021: 24.6 ML/MIN/1.73
EGFRCR SERPLBLD CKD-EPI 2021: 24.9 ML/MIN/1.73
EGFRCR SERPLBLD CKD-EPI 2021: 26.3 ML/MIN/1.73
EGFRCR SERPLBLD CKD-EPI 2021: 28.1 ML/MIN/1.73
EGFRCR SERPLBLD CKD-EPI 2021: 29.6 ML/MIN/1.73
EGFRCR SERPLBLD CKD-EPI 2021: 30.7 ML/MIN/1.73
EGFRCR SERPLBLD CKD-EPI 2021: 34.6 ML/MIN/1.73
EOSINOPHIL # BLD AUTO: 0.11 10*3/MM3 (ref 0–0.4)
EOSINOPHIL # BLD MANUAL: 0 10*3/MM3 (ref 0–0.4)
EOSINOPHIL NFR BLD AUTO: 1.4 % (ref 0.3–6.2)
EOSINOPHIL NFR BLD MANUAL: 0 % (ref 0.3–6.2)
EPAP: 0
ERYTHROCYTE [DISTWIDTH] IN BLOOD BY AUTOMATED COUNT: 13.6 % (ref 12.3–15.4)
ERYTHROCYTE [DISTWIDTH] IN BLOOD BY AUTOMATED COUNT: 13.7 % (ref 12.3–15.4)
ERYTHROCYTE [DISTWIDTH] IN BLOOD BY AUTOMATED COUNT: 14.6 % (ref 12.3–15.4)
ERYTHROCYTE [DISTWIDTH] IN BLOOD BY AUTOMATED COUNT: 15.8 % (ref 12.3–15.4)
ERYTHROCYTE [DISTWIDTH] IN BLOOD BY AUTOMATED COUNT: 15.9 % (ref 12.3–15.4)
ERYTHROCYTE [DISTWIDTH] IN BLOOD BY AUTOMATED COUNT: 16.1 % (ref 12.3–15.4)
FENTANYL UR-MCNC: POSITIVE NG/ML
GEN 5 2HR TROPONIN T REFLEX: 45 NG/L
GLOBULIN UR ELPH-MCNC: 3.1 GM/DL
GLUCOSE BLDC GLUCOMTR-MCNC: 110 MG/DL (ref 70–130)
GLUCOSE BLDC GLUCOMTR-MCNC: 113 MG/DL (ref 70–130)
GLUCOSE BLDC GLUCOMTR-MCNC: 116 MG/DL (ref 70–130)
GLUCOSE BLDC GLUCOMTR-MCNC: 118 MG/DL (ref 70–130)
GLUCOSE BLDC GLUCOMTR-MCNC: 122 MG/DL (ref 70–130)
GLUCOSE BLDC GLUCOMTR-MCNC: 126 MG/DL (ref 70–130)
GLUCOSE BLDC GLUCOMTR-MCNC: 128 MG/DL (ref 70–130)
GLUCOSE BLDC GLUCOMTR-MCNC: 134 MG/DL (ref 70–130)
GLUCOSE BLDC GLUCOMTR-MCNC: 135 MG/DL (ref 70–130)
GLUCOSE BLDC GLUCOMTR-MCNC: 136 MG/DL (ref 70–130)
GLUCOSE BLDC GLUCOMTR-MCNC: 139 MG/DL (ref 70–130)
GLUCOSE BLDC GLUCOMTR-MCNC: 140 MG/DL (ref 70–130)
GLUCOSE BLDC GLUCOMTR-MCNC: 141 MG/DL (ref 70–130)
GLUCOSE BLDC GLUCOMTR-MCNC: 142 MG/DL (ref 70–130)
GLUCOSE BLDC GLUCOMTR-MCNC: 148 MG/DL (ref 70–130)
GLUCOSE BLDC GLUCOMTR-MCNC: 174 MG/DL (ref 70–130)
GLUCOSE BLDC GLUCOMTR-MCNC: 175 MG/DL (ref 70–130)
GLUCOSE BLDC GLUCOMTR-MCNC: 177 MG/DL (ref 70–130)
GLUCOSE BLDC GLUCOMTR-MCNC: 192 MG/DL (ref 70–130)
GLUCOSE BLDC GLUCOMTR-MCNC: 218 MG/DL (ref 70–130)
GLUCOSE BLDC GLUCOMTR-MCNC: 239 MG/DL (ref 70–130)
GLUCOSE BLDC GLUCOMTR-MCNC: 246 MG/DL (ref 70–130)
GLUCOSE BLDC GLUCOMTR-MCNC: 248 MG/DL (ref 70–130)
GLUCOSE BLDC GLUCOMTR-MCNC: 255 MG/DL (ref 70–130)
GLUCOSE BLDC GLUCOMTR-MCNC: 290 MG/DL (ref 70–130)
GLUCOSE BLDC GLUCOMTR-MCNC: 295 MG/DL (ref 70–130)
GLUCOSE BLDC GLUCOMTR-MCNC: 298 MG/DL (ref 70–130)
GLUCOSE BLDC GLUCOMTR-MCNC: 300 MG/DL (ref 70–130)
GLUCOSE BLDC GLUCOMTR-MCNC: 307 MG/DL (ref 70–130)
GLUCOSE BLDC GLUCOMTR-MCNC: 336 MG/DL (ref 70–130)
GLUCOSE BLDC GLUCOMTR-MCNC: 461 MG/DL (ref 70–130)
GLUCOSE BLDC GLUCOMTR-MCNC: 503 MG/DL (ref 70–130)
GLUCOSE BLDC GLUCOMTR-MCNC: 85 MG/DL (ref 70–130)
GLUCOSE BLDC GLUCOMTR-MCNC: 88 MG/DL (ref 70–130)
GLUCOSE BLDC GLUCOMTR-MCNC: 91 MG/DL (ref 70–130)
GLUCOSE BLDC GLUCOMTR-MCNC: 92 MG/DL (ref 70–130)
GLUCOSE SERPL-MCNC: 172 MG/DL (ref 65–99)
GLUCOSE SERPL-MCNC: 212 MG/DL (ref 65–99)
GLUCOSE SERPL-MCNC: 228 MG/DL (ref 65–99)
GLUCOSE SERPL-MCNC: 248 MG/DL (ref 65–99)
GLUCOSE SERPL-MCNC: 250 MG/DL (ref 65–99)
GLUCOSE SERPL-MCNC: 266 MG/DL (ref 65–99)
GLUCOSE SERPL-MCNC: 273 MG/DL (ref 65–99)
GLUCOSE SERPL-MCNC: 274 MG/DL (ref 65–99)
GLUCOSE SERPL-MCNC: 350 MG/DL (ref 65–99)
GLUCOSE SERPL-MCNC: 549 MG/DL (ref 65–99)
GLUCOSE UR STRIP-MCNC: ABNORMAL MG/DL
GRAM STN SPEC: NORMAL
HBA1C MFR BLD: 9 % (ref 4.8–5.6)
HCO3 BLDA-SCNC: 12.9 MMOL/L (ref 20–26)
HCO3 BLDA-SCNC: 16.8 MMOL/L (ref 20–26)
HCO3 BLDA-SCNC: 18 MMOL/L (ref 20–26)
HCO3 BLDA-SCNC: 18.1 MMOL/L (ref 20–26)
HCO3 BLDA-SCNC: 19.1 MMOL/L (ref 20–26)
HCO3 BLDA-SCNC: 20.1 MMOL/L (ref 20–26)
HCO3 BLDA-SCNC: 21.8 MMOL/L (ref 20–26)
HCO3 BLDV-SCNC: 18.8 MMOL/L (ref 22–28)
HCT VFR BLD AUTO: 15.9 % (ref 34–46.6)
HCT VFR BLD AUTO: 21.4 % (ref 34–46.6)
HCT VFR BLD AUTO: 21.9 % (ref 34–46.6)
HCT VFR BLD AUTO: 29.5 % (ref 34–46.6)
HCT VFR BLD AUTO: 31.2 % (ref 34–46.6)
HCT VFR BLD AUTO: 34.5 % (ref 34–46.6)
HCT VFR BLD CALC: 21.3 % (ref 38–51)
HCT VFR BLD CALC: 22.7 % (ref 38–51)
HCT VFR BLD CALC: 25.9 % (ref 38–51)
HCT VFR BLD CALC: 29.2 % (ref 38–51)
HCT VFR BLD CALC: 29.9 % (ref 38–51)
HCT VFR BLD CALC: 30.6 % (ref 38–51)
HCT VFR BLD CALC: 31.8 % (ref 38–51)
HCT VFR BLDA CALC: 33 % (ref 38–51)
HDLC SERPL-MCNC: 61 MG/DL (ref 40–60)
HGB BLD-MCNC: 10 G/DL (ref 12–15.9)
HGB BLD-MCNC: 10.4 G/DL (ref 12–15.9)
HGB BLD-MCNC: 11.6 G/DL (ref 12–15.9)
HGB BLD-MCNC: 5.6 G/DL (ref 12–15.9)
HGB BLD-MCNC: 7.2 G/DL (ref 12–15.9)
HGB BLD-MCNC: 7.4 G/DL (ref 12–15.9)
HGB BLDA-MCNC: 10 G/DL (ref 14–18)
HGB BLDA-MCNC: 10.4 G/DL (ref 14–18)
HGB BLDA-MCNC: 11.2 G/DL (ref 12–17)
HGB BLDA-MCNC: 7 G/DL (ref 14–18)
HGB BLDA-MCNC: 7.4 G/DL (ref 14–18)
HGB BLDA-MCNC: 8.5 G/DL (ref 14–18)
HGB BLDA-MCNC: 9 G/DL (ref 14–18)
HGB BLDA-MCNC: 9.5 G/DL (ref 14–18)
HGB BLDA-MCNC: 9.8 G/DL (ref 14–18)
HGB UR QL STRIP.AUTO: ABNORMAL
HOLD SPECIMEN: NORMAL
HYALINE CASTS UR QL AUTO: NORMAL /LPF
IMM GRANULOCYTES # BLD AUTO: 0.03 10*3/MM3 (ref 0–0.05)
IMM GRANULOCYTES NFR BLD AUTO: 0.4 % (ref 0–0.5)
INHALED O2 CONCENTRATION: 100 %
INHALED O2 CONCENTRATION: 100 %
INHALED O2 CONCENTRATION: 60 %
INHALED O2 CONCENTRATION: 70 %
INR PPP: 1.1 (ref 0.8–1.2)
IPAP: 0
KETONES UR QL STRIP: NEGATIVE
LDLC SERPL CALC-MCNC: 172 MG/DL (ref 0–100)
LDLC/HDLC SERPL: 2.77 {RATIO}
LEFT ATRIUM VOLUME INDEX: 34.4 ML/M2
LEUKOCYTE ESTERASE UR QL STRIP.AUTO: NEGATIVE
LYMPHOCYTES # BLD AUTO: 2.53 10*3/MM3 (ref 0.7–3.1)
LYMPHOCYTES # BLD MANUAL: 0.44 10*3/MM3 (ref 0.7–3.1)
LYMPHOCYTES NFR BLD AUTO: 33.2 % (ref 19.6–45.3)
LYMPHOCYTES NFR BLD MANUAL: 4 % (ref 5–12)
MAGNESIUM SERPL-MCNC: 1.6 MG/DL (ref 1.6–2.4)
MAGNESIUM SERPL-MCNC: 1.7 MG/DL (ref 1.6–2.4)
MAGNESIUM SERPL-MCNC: 1.9 MG/DL (ref 1.6–2.4)
MAGNESIUM SERPL-MCNC: 2 MG/DL (ref 1.6–2.4)
MAGNESIUM SERPL-MCNC: 2.1 MG/DL (ref 1.6–2.4)
MCH RBC QN AUTO: 30.2 PG (ref 26.6–33)
MCH RBC QN AUTO: 30.3 PG (ref 26.6–33)
MCH RBC QN AUTO: 30.4 PG (ref 26.6–33)
MCH RBC QN AUTO: 30.8 PG (ref 26.6–33)
MCH RBC QN AUTO: 31.1 PG (ref 26.6–33)
MCH RBC QN AUTO: 31.6 PG (ref 26.6–33)
MCHC RBC AUTO-ENTMCNC: 33.3 G/DL (ref 31.5–35.7)
MCHC RBC AUTO-ENTMCNC: 33.6 G/DL (ref 31.5–35.7)
MCHC RBC AUTO-ENTMCNC: 33.6 G/DL (ref 31.5–35.7)
MCHC RBC AUTO-ENTMCNC: 33.8 G/DL (ref 31.5–35.7)
MCHC RBC AUTO-ENTMCNC: 33.9 G/DL (ref 31.5–35.7)
MCHC RBC AUTO-ENTMCNC: 35.2 G/DL (ref 31.5–35.7)
MCV RBC AUTO: 89.4 FL (ref 79–97)
MCV RBC AUTO: 89.8 FL (ref 79–97)
MCV RBC AUTO: 90.3 FL (ref 79–97)
MCV RBC AUTO: 90.7 FL (ref 79–97)
MCV RBC AUTO: 91.5 FL (ref 79–97)
MCV RBC AUTO: 92 FL (ref 79–97)
METAMYELOCYTES NFR BLD MANUAL: 2 % (ref 0–0)
METHADONE UR QL SCN: NEGATIVE
METHGB BLD QL: 0.2 % (ref 0–1.5)
METHGB BLD QL: 0.2 % (ref 0–1.5)
METHGB BLD QL: 0.4 % (ref 0–1.5)
METHGB BLD QL: 0.4 % (ref 0–1.5)
METHGB BLD QL: 0.5 % (ref 0–1.5)
METHGB BLD QL: 0.5 % (ref 0–1.5)
METHGB BLD QL: 0.6 %
METHGB BLD QL: 0.7 % (ref 0–1.5)
MODALITY: ABNORMAL
MONOCYTES # BLD AUTO: 0.52 10*3/MM3 (ref 0.1–0.9)
MONOCYTES # BLD: 0.36 10*3/MM3 (ref 0.1–0.9)
MONOCYTES NFR BLD AUTO: 6.8 % (ref 5–12)
NEUTROPHILS # BLD AUTO: 7.82 10*3/MM3 (ref 1.7–7)
NEUTROPHILS NFR BLD AUTO: 4.41 10*3/MM3 (ref 1.7–7)
NEUTROPHILS NFR BLD AUTO: 57.8 % (ref 42.7–76)
NEUTROPHILS NFR BLD MANUAL: 63 % (ref 42.7–76)
NEUTS BAND NFR BLD MANUAL: 25 % (ref 0–5)
NITRITE UR QL STRIP: NEGATIVE
NRBC BLD AUTO-RTO: 0 /100 WBC (ref 0–0.2)
NRBC SPEC MANUAL: 2 /100 WBC (ref 0–0.2)
NT-PROBNP SERPL-MCNC: 4894 PG/ML (ref 0–900)
OPIATES UR QL: NEGATIVE
OSMOLALITY SERPL: 334 MOSM/KG (ref 275–295)
OSMOLALITY SERPL: 336 MOSM/KG (ref 275–295)
OSMOLALITY SERPL: 336 MOSM/KG (ref 275–295)
OSMOLALITY SERPL: 340 MOSM/KG (ref 275–295)
OSMOLALITY SERPL: 340 MOSM/KG (ref 275–295)
OSMOLALITY SERPL: 342 MOSM/KG (ref 275–295)
OSMOLALITY SERPL: 342 MOSM/KG (ref 275–295)
OSMOLALITY SERPL: 348 MOSM/KG (ref 275–295)
OSMOLALITY SERPL: 349 MOSM/KG (ref 275–295)
OSMOLALITY SERPL: 357 MOSM/KG (ref 275–295)
OSMOLALITY SERPL: 361 MOSM/KG (ref 275–295)
OSMOLALITY SERPL: 372 MOSM/KG (ref 275–295)
OSMOLALITY SERPL: 397 MOSM/KG (ref 275–295)
OXYCODONE UR QL SCN: NEGATIVE
OXYHGB MFR BLDV: 77.5 %
OXYHGB MFR BLDV: 83.8 % (ref 94–99)
OXYHGB MFR BLDV: 87.9 % (ref 94–99)
OXYHGB MFR BLDV: 88.6 % (ref 94–99)
OXYHGB MFR BLDV: 93.9 % (ref 94–99)
OXYHGB MFR BLDV: 94.6 % (ref 94–99)
OXYHGB MFR BLDV: 98.4 % (ref 94–99)
OXYHGB MFR BLDV: 98.7 % (ref 94–99)
PAW @ PEAK INSP FLOW SETTING VENT: 0 CMH2O
PCO2 BLDA: 25 MM HG (ref 35–45)
PCO2 BLDA: 26.4 MM HG (ref 35–45)
PCO2 BLDA: 26.6 MM HG (ref 35–45)
PCO2 BLDA: 28.5 MM HG (ref 35–45)
PCO2 BLDA: 29.4 MM HG (ref 35–45)
PCO2 BLDA: 30.8 MM HG (ref 35–45)
PCO2 BLDA: 33.7 MM HG (ref 35–45)
PCO2 BLDV: 30.2 MM HG (ref 41–51)
PCO2 TEMP ADJ BLD: 25 MM HG (ref 35–45)
PCO2 TEMP ADJ BLD: 26.4 MM HG (ref 35–45)
PCO2 TEMP ADJ BLD: 26.6 MM HG (ref 35–45)
PCO2 TEMP ADJ BLD: 28.5 MM HG (ref 35–45)
PCO2 TEMP ADJ BLD: 29.4 MM HG (ref 35–45)
PCO2 TEMP ADJ BLD: 30.8 MM HG (ref 35–45)
PCO2 TEMP ADJ BLD: 33.7 MM HG (ref 35–45)
PCP UR QL SCN: NEGATIVE
PEEP RESPIRATORY: 10 CM[H2O]
PEEP RESPIRATORY: 10 CM[H2O]
PEEP RESPIRATORY: 5 CM[H2O]
PEEP RESPIRATORY: 5 CM[H2O]
PEEP RESPIRATORY: 8 CM[H2O]
PEEP RESPIRATORY: 8 CM[H2O]
PH BLDA: 7.25 PH UNITS (ref 7.35–7.45)
PH BLDA: 7.41 PH UNITS (ref 7.35–7.45)
PH BLDA: 7.41 PH UNITS (ref 7.35–7.45)
PH BLDA: 7.42 PH UNITS (ref 7.35–7.45)
PH BLDA: 7.42 PH UNITS (ref 7.35–7.45)
PH BLDA: 7.47 PH UNITS (ref 7.35–7.45)
PH BLDA: 7.47 PH UNITS (ref 7.35–7.45)
PH BLDV: 7.4 PH UNITS (ref 7.31–7.41)
PH UR STRIP.AUTO: <=5 [PH] (ref 5–8)
PH, TEMP CORRECTED: 7.25 PH UNITS
PH, TEMP CORRECTED: 7.41 PH UNITS
PH, TEMP CORRECTED: 7.41 PH UNITS
PH, TEMP CORRECTED: 7.42 PH UNITS
PH, TEMP CORRECTED: 7.42 PH UNITS
PH, TEMP CORRECTED: 7.47 PH UNITS
PH, TEMP CORRECTED: 7.47 PH UNITS
PHOSPHATE SERPL-MCNC: 2.5 MG/DL (ref 2.5–4.5)
PHOSPHATE SERPL-MCNC: 2.9 MG/DL (ref 2.5–4.5)
PHOSPHATE SERPL-MCNC: 3.3 MG/DL (ref 2.5–4.5)
PHOSPHATE SERPL-MCNC: 3.4 MG/DL (ref 2.5–4.5)
PHOSPHATE SERPL-MCNC: 3.6 MG/DL (ref 2.5–4.5)
PLAT MORPH BLD: NORMAL
PLATELET # BLD AUTO: 135 10*3/MM3 (ref 140–450)
PLATELET # BLD AUTO: 174 10*3/MM3 (ref 140–450)
PLATELET # BLD AUTO: 187 10*3/MM3 (ref 140–450)
PLATELET # BLD AUTO: 192 10*3/MM3 (ref 140–450)
PLATELET # BLD AUTO: 269 10*3/MM3 (ref 140–450)
PLATELET # BLD AUTO: 334 10*3/MM3 (ref 140–450)
PMV BLD AUTO: 10.6 FL (ref 6–12)
PMV BLD AUTO: 11.2 FL (ref 6–12)
PMV BLD AUTO: 11.4 FL (ref 6–12)
PMV BLD AUTO: 12.1 FL (ref 6–12)
PMV BLD AUTO: 12.2 FL (ref 6–12)
PMV BLD AUTO: 12.3 FL (ref 6–12)
PO2 BLDA: 167 MM HG (ref 83–108)
PO2 BLDA: 195 MM HG (ref 83–108)
PO2 BLDA: 47.7 MM HG (ref 83–108)
PO2 BLDA: 48.7 MM HG (ref 83–108)
PO2 BLDA: 51.9 MM HG (ref 83–108)
PO2 BLDA: 68.3 MM HG (ref 83–108)
PO2 BLDA: 73.2 MM HG (ref 83–108)
PO2 BLDV: 41.4 MM HG (ref 27–53)
PO2 TEMP ADJ BLD: 167 MM HG (ref 83–108)
PO2 TEMP ADJ BLD: 195 MM HG (ref 83–108)
PO2 TEMP ADJ BLD: 47.7 MM HG (ref 83–108)
PO2 TEMP ADJ BLD: 48.7 MM HG (ref 83–108)
PO2 TEMP ADJ BLD: 51.9 MM HG (ref 83–108)
PO2 TEMP ADJ BLD: 68.3 MM HG (ref 83–108)
PO2 TEMP ADJ BLD: 73.2 MM HG (ref 83–108)
POTASSIUM BLDA-SCNC: 3.6 MMOL/L (ref 3.5–4.9)
POTASSIUM SERPL-SCNC: 3.6 MMOL/L (ref 3.5–5.2)
POTASSIUM SERPL-SCNC: 3.7 MMOL/L (ref 3.5–5.2)
POTASSIUM SERPL-SCNC: 3.9 MMOL/L (ref 3.5–5.2)
POTASSIUM SERPL-SCNC: 4.1 MMOL/L (ref 3.5–5.2)
POTASSIUM SERPL-SCNC: 4.3 MMOL/L (ref 3.5–5.2)
POTASSIUM SERPL-SCNC: 4.7 MMOL/L (ref 3.5–5.2)
PROT SERPL-MCNC: 4.8 G/DL (ref 6–8.5)
PROT SERPL-MCNC: 5.3 G/DL (ref 6–8.5)
PROT SERPL-MCNC: 7.1 G/DL (ref 6–8.5)
PROT UR QL STRIP: ABNORMAL
PROTHROMBIN TIME: 13.6 SECONDS (ref 12.8–15.2)
QT INTERVAL: 376 MS
QTC INTERVAL: 470 MS
RBC # BLD AUTO: 1.77 10*6/MM3 (ref 3.77–5.28)
RBC # BLD AUTO: 2.34 10*6/MM3 (ref 3.77–5.28)
RBC # BLD AUTO: 2.38 10*6/MM3 (ref 3.77–5.28)
RBC # BLD AUTO: 3.3 10*6/MM3 (ref 3.77–5.28)
RBC # BLD AUTO: 3.44 10*6/MM3 (ref 3.77–5.28)
RBC # BLD AUTO: 3.82 10*6/MM3 (ref 3.77–5.28)
RBC # UR STRIP: NORMAL /HPF
REF LAB TEST METHOD: NORMAL
SODIUM BLD-SCNC: 140 MMOL/L (ref 138–146)
SODIUM SERPL-SCNC: 138 MMOL/L (ref 136–145)
SODIUM SERPL-SCNC: 142 MMOL/L (ref 136–145)
SODIUM SERPL-SCNC: 147 MMOL/L (ref 136–145)
SODIUM SERPL-SCNC: 148 MMOL/L (ref 136–145)
SODIUM SERPL-SCNC: 148 MMOL/L (ref 136–145)
SODIUM SERPL-SCNC: 150 MMOL/L (ref 136–145)
SODIUM SERPL-SCNC: 150 MMOL/L (ref 136–145)
SODIUM SERPL-SCNC: 151 MMOL/L (ref 136–145)
SODIUM SERPL-SCNC: 152 MMOL/L (ref 136–145)
SODIUM SERPL-SCNC: 155 MMOL/L (ref 136–145)
SODIUM SERPL-SCNC: 156 MMOL/L (ref 136–145)
SODIUM SERPL-SCNC: 160 MMOL/L (ref 136–145)
SODIUM UR-SCNC: 33 MMOL/L
SODIUM UR-SCNC: 73 MMOL/L
SP GR UR STRIP: >=1.03 (ref 1–1.03)
SQUAMOUS #/AREA URNS HPF: NORMAL /HPF
TOTAL RATE: 0 BREATHS/MINUTE
TOTAL RATE: 16 BREATHS/MINUTE
TOTAL RATE: 22 BREATHS/MINUTE
TOTAL RATE: 36 BREATHS/MINUTE
TRICYCLICS UR QL SCN: NEGATIVE
TRIGL SERPL-MCNC: 160 MG/DL (ref 0–150)
TROPONIN T DELTA: 9 NG/L
TROPONIN T SERPL HS-MCNC: 36 NG/L
TROPONIN T SERPL HS-MCNC: 62 NG/L
TSH SERPL DL<=0.05 MIU/L-ACNC: 3.29 UIU/ML (ref 0.27–4.2)
UFH PPP CHRO-ACNC: 0.1 IU/ML (ref 0.3–0.7)
UROBILINOGEN UR QL STRIP: ABNORMAL
VARIANT LYMPHS NFR BLD MANUAL: 5 % (ref 19.6–45.3)
VENTILATOR MODE: ABNORMAL
VLDLC SERPL-MCNC: 29 MG/DL (ref 5–40)
VT ON VENT VENT: 0.4 ML
VT ON VENT VENT: 0.45 ML
VT ON VENT VENT: 0.45 ML
WBC # UR STRIP: NORMAL /HPF
WBC MORPH BLD: NORMAL
WBC NRBC COR # BLD AUTO: 12.97 10*3/MM3 (ref 3.4–10.8)
WBC NRBC COR # BLD AUTO: 7.63 10*3/MM3 (ref 3.4–10.8)
WBC NRBC COR # BLD AUTO: 7.74 10*3/MM3 (ref 3.4–10.8)
WBC NRBC COR # BLD AUTO: 8.89 10*3/MM3 (ref 3.4–10.8)
WBC NRBC COR # BLD AUTO: 8.9 10*3/MM3 (ref 3.4–10.8)
WBC NRBC COR # BLD AUTO: 9.77 10*3/MM3 (ref 3.4–10.8)
WHOLE BLOOD HOLD COAG: NORMAL
WHOLE BLOOD HOLD SPECIMEN: NORMAL

## 2024-01-01 PROCEDURE — 94761 N-INVAS EAR/PLS OXIMETRY MLT: CPT

## 2024-01-01 PROCEDURE — 25810000003 SODIUM CHLORIDE 0.9 % SOLUTION 250 ML FLEX CONT: Performed by: NURSE PRACTITIONER

## 2024-01-01 PROCEDURE — 83050 HGB METHEMOGLOBIN QUAN: CPT

## 2024-01-01 PROCEDURE — 82805 BLOOD GASES W/O2 SATURATION: CPT

## 2024-01-01 PROCEDURE — 99232 SBSQ HOSP IP/OBS MODERATE 35: CPT | Performed by: NURSE PRACTITIONER

## 2024-01-01 PROCEDURE — C1760 CLOSURE DEV, VASC: HCPCS | Performed by: RADIOLOGY

## 2024-01-01 PROCEDURE — 92610 EVALUATE SWALLOWING FUNCTION: CPT

## 2024-01-01 PROCEDURE — 82948 REAGENT STRIP/BLOOD GLUCOSE: CPT

## 2024-01-01 PROCEDURE — 25010000002 FENTANYL CITRATE (PF) 50 MCG/ML SOLUTION: Performed by: INTERNAL MEDICINE

## 2024-01-01 PROCEDURE — 25010000002 NICARDIPINE 2.5 MG/ML SOLUTION 10 ML VIAL: Performed by: INTERNAL MEDICINE

## 2024-01-01 PROCEDURE — 82010 KETONE BODYS QUAN: CPT | Performed by: NURSE PRACTITIONER

## 2024-01-01 PROCEDURE — 85007 BL SMEAR W/DIFF WBC COUNT: CPT | Performed by: INTERNAL MEDICINE

## 2024-01-01 PROCEDURE — 80076 HEPATIC FUNCTION PANEL: CPT | Performed by: INTERNAL MEDICINE

## 2024-01-01 PROCEDURE — 84484 ASSAY OF TROPONIN QUANT: CPT | Performed by: NURSE PRACTITIONER

## 2024-01-01 PROCEDURE — 25010000002 LABETALOL 5 MG/ML SOLUTION: Performed by: NURSE PRACTITIONER

## 2024-01-01 PROCEDURE — 74018 RADEX ABDOMEN 1 VIEW: CPT

## 2024-01-01 PROCEDURE — 97166 OT EVAL MOD COMPLEX 45 MIN: CPT

## 2024-01-01 PROCEDURE — 85014 HEMATOCRIT: CPT | Performed by: EMERGENCY MEDICINE

## 2024-01-01 PROCEDURE — 85025 COMPLETE CBC W/AUTO DIFF WBC: CPT | Performed by: INTERNAL MEDICINE

## 2024-01-01 PROCEDURE — 36600 WITHDRAWAL OF ARTERIAL BLOOD: CPT

## 2024-01-01 PROCEDURE — 71045 X-RAY EXAM CHEST 1 VIEW: CPT

## 2024-01-01 PROCEDURE — 63710000001 INSULIN DETEMIR PER 5 UNITS: Performed by: INTERNAL MEDICINE

## 2024-01-01 PROCEDURE — 25010000002 PROPOFOL 10 MG/ML EMULSION

## 2024-01-01 PROCEDURE — 94799 UNLISTED PULMONARY SVC/PX: CPT

## 2024-01-01 PROCEDURE — 25010000002 NICARDIPINE 2.5 MG/ML SOLUTION 10 ML VIAL: Performed by: NURSE PRACTITIONER

## 2024-01-01 PROCEDURE — 0 DEXTROSE 5 % SOLUTION 1,000 ML FLEX CONT: Performed by: NURSE PRACTITIONER

## 2024-01-01 PROCEDURE — 83930 ASSAY OF BLOOD OSMOLALITY: CPT | Performed by: NURSE PRACTITIONER

## 2024-01-01 PROCEDURE — 84443 ASSAY THYROID STIM HORMONE: CPT | Performed by: NURSE PRACTITIONER

## 2024-01-01 PROCEDURE — 61645 PERQ ART M-THROMBECT &/NFS: CPT | Performed by: RADIOLOGY

## 2024-01-01 PROCEDURE — 85025 COMPLETE CBC W/AUTO DIFF WBC: CPT | Performed by: EMERGENCY MEDICINE

## 2024-01-01 PROCEDURE — 87205 SMEAR GRAM STAIN: CPT

## 2024-01-01 PROCEDURE — 84484 ASSAY OF TROPONIN QUANT: CPT | Performed by: EMERGENCY MEDICINE

## 2024-01-01 PROCEDURE — 81001 URINALYSIS AUTO W/SCOPE: CPT | Performed by: NURSE PRACTITIONER

## 2024-01-01 PROCEDURE — 25010000002 CEFTRIAXONE PER 250 MG: Performed by: INTERNAL MEDICINE

## 2024-01-01 PROCEDURE — 84295 ASSAY OF SERUM SODIUM: CPT | Performed by: NURSE PRACTITIONER

## 2024-01-01 PROCEDURE — 25010000002 MIDAZOLAM PER 1 MG: Performed by: RADIOLOGY

## 2024-01-01 PROCEDURE — 84100 ASSAY OF PHOSPHORUS: CPT | Performed by: INTERNAL MEDICINE

## 2024-01-01 PROCEDURE — 85027 COMPLETE CBC AUTOMATED: CPT | Performed by: NURSE PRACTITIONER

## 2024-01-01 PROCEDURE — 83735 ASSAY OF MAGNESIUM: CPT | Performed by: INTERNAL MEDICINE

## 2024-01-01 PROCEDURE — 99291 CRITICAL CARE FIRST HOUR: CPT | Performed by: INTERNAL MEDICINE

## 2024-01-01 PROCEDURE — 99233 SBSQ HOSP IP/OBS HIGH 50: CPT | Performed by: PSYCHIATRY & NEUROLOGY

## 2024-01-01 PROCEDURE — 70498 CT ANGIOGRAPHY NECK: CPT

## 2024-01-01 PROCEDURE — 70450 CT HEAD/BRAIN W/O DYE: CPT

## 2024-01-01 PROCEDURE — 25810000003 SODIUM CHLORIDE 0.9 % SOLUTION 250 ML FLEX CONT: Performed by: INTERNAL MEDICINE

## 2024-01-01 PROCEDURE — 70496 CT ANGIOGRAPHY HEAD: CPT

## 2024-01-01 PROCEDURE — 25010000002 FENTANYL CITRATE (PF) 50 MCG/ML SOLUTION: Performed by: RADIOLOGY

## 2024-01-01 PROCEDURE — 93306 TTE W/DOPPLER COMPLETE: CPT | Performed by: INTERNAL MEDICINE

## 2024-01-01 PROCEDURE — 87070 CULTURE OTHR SPECIMN AEROBIC: CPT

## 2024-01-01 PROCEDURE — 25010000002 ESMOLOL 2500 MG/250ML SOLUTION: Performed by: INTERNAL MEDICINE

## 2024-01-01 PROCEDURE — 94003 VENT MGMT INPAT SUBQ DAY: CPT

## 2024-01-01 PROCEDURE — C1894 INTRO/SHEATH, NON-LASER: HCPCS

## 2024-01-01 PROCEDURE — 63710000001 INSULIN LISPRO (HUMAN) PER 5 UNITS: Performed by: NURSE PRACTITIONER

## 2024-01-01 PROCEDURE — 99232 SBSQ HOSP IP/OBS MODERATE 35: CPT | Performed by: PSYCHIATRY & NEUROLOGY

## 2024-01-01 PROCEDURE — 5A1945Z RESPIRATORY VENTILATION, 24-96 CONSECUTIVE HOURS: ICD-10-PCS

## 2024-01-01 PROCEDURE — 99291 CRITICAL CARE FIRST HOUR: CPT | Performed by: STUDENT IN AN ORGANIZED HEALTH CARE EDUCATION/TRAINING PROGRAM

## 2024-01-01 PROCEDURE — 63710000001 INSULIN REGULAR HUMAN PER 5 UNITS: Performed by: INTERNAL MEDICINE

## 2024-01-01 PROCEDURE — 99153 MOD SED SAME PHYS/QHP EA: CPT | Performed by: RADIOLOGY

## 2024-01-01 PROCEDURE — 83605 ASSAY OF LACTIC ACID: CPT | Performed by: INTERNAL MEDICINE

## 2024-01-01 PROCEDURE — 99222 1ST HOSP IP/OBS MODERATE 55: CPT

## 2024-01-01 PROCEDURE — 31500 INSERT EMERGENCY AIRWAY: CPT

## 2024-01-01 PROCEDURE — 25010000002 NICARDIPINE 2.5 MG/ML SOLUTION 10 ML VIAL: Performed by: RADIOLOGY

## 2024-01-01 PROCEDURE — C1769 GUIDE WIRE: HCPCS | Performed by: RADIOLOGY

## 2024-01-01 PROCEDURE — 85027 COMPLETE CBC AUTOMATED: CPT | Performed by: INTERNAL MEDICINE

## 2024-01-01 PROCEDURE — C1751 CATH, INF, PER/CENT/MIDLINE: HCPCS

## 2024-01-01 PROCEDURE — 25010000002 GLYCOPYRROLATE 0.2 MG/ML SOLUTION: Performed by: INTERNAL MEDICINE

## 2024-01-01 PROCEDURE — 85520 HEPARIN ASSAY: CPT | Performed by: EMERGENCY MEDICINE

## 2024-01-01 PROCEDURE — C2628 CATHETER, OCCLUSION: HCPCS | Performed by: RADIOLOGY

## 2024-01-01 PROCEDURE — 02HV33Z INSERTION OF INFUSION DEVICE INTO SUPERIOR VENA CAVA, PERCUTANEOUS APPROACH: ICD-10-PCS | Performed by: NURSE PRACTITIONER

## 2024-01-01 PROCEDURE — 84300 ASSAY OF URINE SODIUM: CPT | Performed by: NURSE PRACTITIONER

## 2024-01-01 PROCEDURE — 99223 1ST HOSP IP/OBS HIGH 75: CPT | Performed by: INTERNAL MEDICINE

## 2024-01-01 PROCEDURE — 84100 ASSAY OF PHOSPHORUS: CPT

## 2024-01-01 PROCEDURE — C1887 CATHETER, GUIDING: HCPCS | Performed by: RADIOLOGY

## 2024-01-01 PROCEDURE — 83735 ASSAY OF MAGNESIUM: CPT

## 2024-01-01 PROCEDURE — 0BH17EZ INSERTION OF ENDOTRACHEAL AIRWAY INTO TRACHEA, VIA NATURAL OR ARTIFICIAL OPENING: ICD-10-PCS

## 2024-01-01 PROCEDURE — 25010000002 POTASSIUM CHLORIDE PER 2 MEQ: Performed by: INTERNAL MEDICINE

## 2024-01-01 PROCEDURE — 83735 ASSAY OF MAGNESIUM: CPT | Performed by: NURSE PRACTITIONER

## 2024-01-01 PROCEDURE — 97162 PT EVAL MOD COMPLEX 30 MIN: CPT

## 2024-01-01 PROCEDURE — 80048 BASIC METABOLIC PNL TOTAL CA: CPT | Performed by: NURSE PRACTITIONER

## 2024-01-01 PROCEDURE — 99152 MOD SED SAME PHYS/QHP 5/>YRS: CPT | Performed by: RADIOLOGY

## 2024-01-01 PROCEDURE — 25810000003 SODIUM CHLORIDE 3 % SOLUTION: Performed by: NURSE PRACTITIONER

## 2024-01-01 PROCEDURE — 85610 PROTHROMBIN TIME: CPT | Performed by: EMERGENCY MEDICINE

## 2024-01-01 PROCEDURE — 80048 BASIC METABOLIC PNL TOTAL CA: CPT | Performed by: INTERNAL MEDICINE

## 2024-01-01 PROCEDURE — 82375 ASSAY CARBOXYHB QUANT: CPT

## 2024-01-01 PROCEDURE — 25010000002 ATROPINE SULFATE

## 2024-01-01 PROCEDURE — 25010000002 METRONIDAZOLE 500 MG/100ML SOLUTION: Performed by: INTERNAL MEDICINE

## 2024-01-01 PROCEDURE — 82570 ASSAY OF URINE CREATININE: CPT | Performed by: NURSE PRACTITIONER

## 2024-01-01 PROCEDURE — 93005 ELECTROCARDIOGRAM TRACING: CPT | Performed by: EMERGENCY MEDICINE

## 2024-01-01 PROCEDURE — 25010000002 FENTANYL 10 MCG/1 ML NS: Performed by: INTERNAL MEDICINE

## 2024-01-01 PROCEDURE — 63710000001 INSULIN DETEMIR PER 5 UNITS: Performed by: NURSE PRACTITIONER

## 2024-01-01 PROCEDURE — 0042T HC CT CEREBRAL PERFUSION W/WO CONTRAST: CPT

## 2024-01-01 PROCEDURE — 80061 LIPID PANEL: CPT

## 2024-01-01 PROCEDURE — 25810000003 SODIUM CHLORIDE 0.9 % SOLUTION: Performed by: NURSE PRACTITIONER

## 2024-01-01 PROCEDURE — 93306 TTE W/DOPPLER COMPLETE: CPT

## 2024-01-01 PROCEDURE — 85730 THROMBOPLASTIN TIME PARTIAL: CPT | Performed by: EMERGENCY MEDICINE

## 2024-01-01 PROCEDURE — 99291 CRITICAL CARE FIRST HOUR: CPT

## 2024-01-01 PROCEDURE — C1757 CATH, THROMBECTOMY/EMBOLECT: HCPCS | Performed by: RADIOLOGY

## 2024-01-01 PROCEDURE — 0 IODIXANOL PER 1 ML: Performed by: RADIOLOGY

## 2024-01-01 PROCEDURE — 80307 DRUG TEST PRSMV CHEM ANLYZR: CPT | Performed by: NURSE PRACTITIONER

## 2024-01-01 PROCEDURE — 25010000002 METOCLOPRAMIDE PER 10 MG: Performed by: NURSE PRACTITIONER

## 2024-01-01 PROCEDURE — 92523 SPEECH SOUND LANG COMPREHEN: CPT

## 2024-01-01 PROCEDURE — C1894 INTRO/SHEATH, NON-LASER: HCPCS | Performed by: RADIOLOGY

## 2024-01-01 PROCEDURE — 83036 HEMOGLOBIN GLYCOSYLATED A1C: CPT | Performed by: NURSE PRACTITIONER

## 2024-01-01 PROCEDURE — 25810000003 SODIUM CHLORIDE 3 % SOLUTION

## 2024-01-01 PROCEDURE — 80047 BASIC METABLC PNL IONIZED CA: CPT | Performed by: EMERGENCY MEDICINE

## 2024-01-01 PROCEDURE — 95816 EEG AWAKE AND DROWSY: CPT

## 2024-01-01 PROCEDURE — 99222 1ST HOSP IP/OBS MODERATE 55: CPT | Performed by: NEUROLOGICAL SURGERY

## 2024-01-01 PROCEDURE — 4A02X4A MEASUREMENT OF CARDIAC ELECTRICAL ACTIVITY, GUIDANCE, EXTERNAL APPROACH: ICD-10-PCS | Performed by: NURSE PRACTITIONER

## 2024-01-01 PROCEDURE — 63710000001 INSULIN REGULAR HUMAN PER 5 UNITS: Performed by: NURSE PRACTITIONER

## 2024-01-01 PROCEDURE — 84100 ASSAY OF PHOSPHORUS: CPT | Performed by: NURSE PRACTITIONER

## 2024-01-01 PROCEDURE — 03CG3Z7 EXTIRPATION OF MATTER FROM INTRACRANIAL ARTERY USING STENT RETRIEVER, PERCUTANEOUS APPROACH: ICD-10-PCS | Performed by: RADIOLOGY

## 2024-01-01 PROCEDURE — 83880 ASSAY OF NATRIURETIC PEPTIDE: CPT | Performed by: NURSE PRACTITIONER

## 2024-01-01 PROCEDURE — 95816 EEG AWAKE AND DROWSY: CPT | Performed by: PSYCHIATRY & NEUROLOGY

## 2024-01-01 PROCEDURE — 80053 COMPREHEN METABOLIC PANEL: CPT | Performed by: EMERGENCY MEDICINE

## 2024-01-01 PROCEDURE — 94002 VENT MGMT INPAT INIT DAY: CPT

## 2024-01-01 PROCEDURE — 25510000001 IOPAMIDOL PER 1 ML: Performed by: EMERGENCY MEDICINE

## 2024-01-01 PROCEDURE — 25010000002 MIDAZOLAM PER 1 MG: Performed by: EMERGENCY MEDICINE

## 2024-01-01 PROCEDURE — 25810000003 SODIUM CHLORIDE 0.9 % SOLUTION 250 ML FLEX CONT: Performed by: RADIOLOGY

## 2024-01-01 PROCEDURE — 25010000002 MIDAZOLAM PER 1 MG: Performed by: INTERNAL MEDICINE

## 2024-01-01 PROCEDURE — 25010000002 FUROSEMIDE PER 20 MG: Performed by: NURSE PRACTITIONER

## 2024-01-01 PROCEDURE — 25010000002 HYDRALAZINE PER 20 MG: Performed by: INTERNAL MEDICINE

## 2024-01-01 RX ORDER — PROCHLORPERAZINE EDISYLATE 5 MG/ML
2.5 INJECTION INTRAMUSCULAR; INTRAVENOUS ONCE
Status: DISCONTINUED | OUTPATIENT
Start: 2024-01-01 | End: 2024-01-01

## 2024-01-01 RX ORDER — IBUPROFEN 600 MG/1
1 TABLET ORAL
Status: DISCONTINUED | OUTPATIENT
Start: 2024-01-01 | End: 2024-01-01

## 2024-01-01 RX ORDER — DEXTROSE MONOHYDRATE 25 G/50ML
25 INJECTION, SOLUTION INTRAVENOUS
Status: DISCONTINUED | OUTPATIENT
Start: 2024-01-01 | End: 2024-01-01

## 2024-01-01 RX ORDER — POLYETHYLENE GLYCOL 3350 17 G/17G
17 POWDER, FOR SOLUTION ORAL DAILY PRN
Status: DISCONTINUED | OUTPATIENT
Start: 2024-01-01 | End: 2024-01-01

## 2024-01-01 RX ORDER — DEXTROSE MONOHYDRATE 25 G/50ML
10-50 INJECTION, SOLUTION INTRAVENOUS
Status: DISCONTINUED | OUTPATIENT
Start: 2024-01-01 | End: 2024-01-01

## 2024-01-01 RX ORDER — SODIUM CHLORIDE 0.9 % (FLUSH) 0.9 %
10 SYRINGE (ML) INJECTION AS NEEDED
Status: DISCONTINUED | OUTPATIENT
Start: 2024-01-01 | End: 2024-01-01

## 2024-01-01 RX ORDER — MIDAZOLAM HYDROCHLORIDE 1 MG/ML
5 INJECTION INTRAMUSCULAR; INTRAVENOUS ONCE
Status: COMPLETED | OUTPATIENT
Start: 2024-01-01 | End: 2024-01-01

## 2024-01-01 RX ORDER — MIDAZOLAM HYDROCHLORIDE 1 MG/ML
2.5 INJECTION INTRAMUSCULAR; INTRAVENOUS ONCE
Status: DISCONTINUED | OUTPATIENT
Start: 2024-01-01 | End: 2024-01-01 | Stop reason: SDUPTHER

## 2024-01-01 RX ORDER — MIDAZOLAM HYDROCHLORIDE 1 MG/ML
INJECTION INTRAMUSCULAR; INTRAVENOUS
Status: DISPENSED
Start: 2024-01-01 | End: 2024-01-01

## 2024-01-01 RX ORDER — SODIUM CHLORIDE 9 MG/ML
40 INJECTION, SOLUTION INTRAVENOUS AS NEEDED
Status: DISCONTINUED | OUTPATIENT
Start: 2024-01-01 | End: 2024-02-12 | Stop reason: HOSPADM

## 2024-01-01 RX ORDER — SODIUM CHLORIDE 0.9 % (FLUSH) 0.9 %
10 SYRINGE (ML) INJECTION EVERY 12 HOURS SCHEDULED
Status: DISCONTINUED | OUTPATIENT
Start: 2024-01-01 | End: 2024-01-01

## 2024-01-01 RX ORDER — PROPOFOL 10 MG/ML
VIAL (ML) INTRAVENOUS
Status: COMPLETED
Start: 2024-01-01 | End: 2024-01-01

## 2024-01-01 RX ORDER — FENTANYL CITRATE 50 UG/ML
INJECTION, SOLUTION INTRAMUSCULAR; INTRAVENOUS
Status: DISCONTINUED | OUTPATIENT
Start: 2024-01-01 | End: 2024-01-01 | Stop reason: HOSPADM

## 2024-01-01 RX ORDER — BISACODYL 10 MG
10 SUPPOSITORY, RECTAL RECTAL DAILY PRN
Status: DISCONTINUED | OUTPATIENT
Start: 2024-01-01 | End: 2024-01-01

## 2024-01-01 RX ORDER — NICOTINE POLACRILEX 4 MG
15 LOZENGE BUCCAL
Status: DISCONTINUED | OUTPATIENT
Start: 2024-01-01 | End: 2024-01-01

## 2024-01-01 RX ORDER — GLYCOPYRROLATE 0.2 MG/ML
0.4 INJECTION INTRAMUSCULAR; INTRAVENOUS EVERY 4 HOURS PRN
Status: DISCONTINUED | OUTPATIENT
Start: 2024-01-01 | End: 2024-02-12 | Stop reason: HOSPADM

## 2024-01-01 RX ORDER — ATORVASTATIN CALCIUM 40 MG/1
80 TABLET, FILM COATED ORAL NIGHTLY
Status: DISCONTINUED | OUTPATIENT
Start: 2024-01-01 | End: 2024-01-01

## 2024-01-01 RX ORDER — BISACODYL 5 MG/1
5 TABLET, DELAYED RELEASE ORAL DAILY PRN
Status: DISCONTINUED | OUTPATIENT
Start: 2024-01-01 | End: 2024-01-01

## 2024-01-01 RX ORDER — 3% SODIUM CHLORIDE 3 G/100ML
15 INJECTION, SOLUTION INTRAVENOUS CONTINUOUS
Status: DISCONTINUED | OUTPATIENT
Start: 2024-01-01 | End: 2024-01-01

## 2024-01-01 RX ORDER — METOCLOPRAMIDE HYDROCHLORIDE 5 MG/ML
10 INJECTION INTRAMUSCULAR; INTRAVENOUS ONCE
Status: COMPLETED | OUTPATIENT
Start: 2024-01-01 | End: 2024-01-01

## 2024-01-01 RX ORDER — INSULIN LISPRO 100 [IU]/ML
20 INJECTION, SOLUTION INTRAVENOUS; SUBCUTANEOUS ONCE
Status: COMPLETED | OUTPATIENT
Start: 2024-01-01 | End: 2024-01-01

## 2024-01-01 RX ORDER — 3% SODIUM CHLORIDE 3 G/100ML
15 INJECTION, SOLUTION INTRAVENOUS CONTINUOUS
Status: DISPENSED | OUTPATIENT
Start: 2024-01-01 | End: 2024-01-01

## 2024-01-01 RX ORDER — FENTANYL CITRATE 50 UG/ML
50 INJECTION, SOLUTION INTRAMUSCULAR; INTRAVENOUS
Status: DISCONTINUED | OUTPATIENT
Start: 2024-01-01 | End: 2024-02-12 | Stop reason: HOSPADM

## 2024-01-01 RX ORDER — AMOXICILLIN 250 MG
2 CAPSULE ORAL 2 TIMES DAILY
Status: DISCONTINUED | OUTPATIENT
Start: 2024-01-01 | End: 2024-01-01

## 2024-01-01 RX ORDER — MIDAZOLAM HYDROCHLORIDE 1 MG/ML
5 INJECTION INTRAMUSCULAR; INTRAVENOUS ONCE
Status: DISCONTINUED | OUTPATIENT
Start: 2024-01-01 | End: 2024-01-01

## 2024-01-01 RX ORDER — ACETAMINOPHEN 650 MG/1
650 SUPPOSITORY RECTAL EVERY 6 HOURS PRN
Status: DISCONTINUED | OUTPATIENT
Start: 2024-01-01 | End: 2024-02-12 | Stop reason: HOSPADM

## 2024-01-01 RX ORDER — ETOMIDATE 2 MG/ML
20 INJECTION INTRAVENOUS ONCE
Status: COMPLETED | OUTPATIENT
Start: 2024-01-01 | End: 2024-01-01

## 2024-01-01 RX ORDER — ACETAMINOPHEN 325 MG/1
650 TABLET ORAL EVERY 6 HOURS PRN
Status: DISCONTINUED | OUTPATIENT
Start: 2024-01-01 | End: 2024-02-12 | Stop reason: HOSPADM

## 2024-01-01 RX ORDER — ASPIRIN 81 MG/1
81 TABLET, CHEWABLE ORAL DAILY
Status: DISCONTINUED | OUTPATIENT
Start: 2024-01-01 | End: 2024-01-01

## 2024-01-01 RX ORDER — IODIXANOL 320 MG/ML
INJECTION, SOLUTION INTRAVASCULAR
Status: DISCONTINUED | OUTPATIENT
Start: 2024-01-01 | End: 2024-01-01 | Stop reason: HOSPADM

## 2024-01-01 RX ORDER — SODIUM CHLORIDE 9 MG/ML
40 INJECTION, SOLUTION INTRAVENOUS AS NEEDED
Status: DISCONTINUED | OUTPATIENT
Start: 2024-01-01 | End: 2024-01-01

## 2024-01-01 RX ORDER — LABETALOL HYDROCHLORIDE 5 MG/ML
INJECTION, SOLUTION INTRAVENOUS
Status: DISCONTINUED | OUTPATIENT
Start: 2024-01-01 | End: 2024-01-01 | Stop reason: HOSPADM

## 2024-01-01 RX ORDER — SODIUM CHLORIDE 9 MG/ML
100 INJECTION, SOLUTION INTRAVENOUS CONTINUOUS
Status: DISCONTINUED | OUTPATIENT
Start: 2024-01-01 | End: 2024-01-01

## 2024-01-01 RX ORDER — FUROSEMIDE 10 MG/ML
40 INJECTION INTRAMUSCULAR; INTRAVENOUS ONCE
Status: COMPLETED | OUTPATIENT
Start: 2024-01-01 | End: 2024-01-01

## 2024-01-01 RX ORDER — HYDRALAZINE HYDROCHLORIDE 20 MG/ML
20 INJECTION INTRAMUSCULAR; INTRAVENOUS EVERY 6 HOURS PRN
Status: DISCONTINUED | OUTPATIENT
Start: 2024-01-01 | End: 2024-01-01

## 2024-01-01 RX ORDER — ASPIRIN 300 MG/1
300 SUPPOSITORY RECTAL DAILY
Status: DISCONTINUED | OUTPATIENT
Start: 2024-01-01 | End: 2024-01-01

## 2024-01-01 RX ORDER — PANTOPRAZOLE SODIUM 40 MG/10ML
40 INJECTION, POWDER, LYOPHILIZED, FOR SOLUTION INTRAVENOUS
Status: DISCONTINUED | OUTPATIENT
Start: 2024-01-01 | End: 2024-01-01

## 2024-01-01 RX ORDER — ACETAMINOPHEN 650 MG/1
650 SUPPOSITORY RECTAL EVERY 6 HOURS PRN
Status: DISCONTINUED | OUTPATIENT
Start: 2024-01-01 | End: 2024-01-01

## 2024-01-01 RX ORDER — NITROGLYCERIN 0.4 MG/1
0.4 TABLET SUBLINGUAL
Status: DISCONTINUED | OUTPATIENT
Start: 2024-01-01 | End: 2024-01-01

## 2024-01-01 RX ORDER — MIDAZOLAM HYDROCHLORIDE 1 MG/ML
2 INJECTION INTRAMUSCULAR; INTRAVENOUS
Status: DISCONTINUED | OUTPATIENT
Start: 2024-01-01 | End: 2024-02-12 | Stop reason: HOSPADM

## 2024-01-01 RX ORDER — CHLORHEXIDINE GLUCONATE ORAL RINSE 1.2 MG/ML
15 SOLUTION DENTAL EVERY 12 HOURS SCHEDULED
Status: DISCONTINUED | OUTPATIENT
Start: 2024-01-01 | End: 2024-02-12 | Stop reason: HOSPADM

## 2024-01-01 RX ORDER — LABETALOL HYDROCHLORIDE 5 MG/ML
20 INJECTION, SOLUTION INTRAVENOUS EVERY 4 HOURS PRN
Status: DISCONTINUED | OUTPATIENT
Start: 2024-01-01 | End: 2024-01-01

## 2024-01-01 RX ORDER — ACETAMINOPHEN 325 MG/1
650 TABLET ORAL EVERY 6 HOURS PRN
Status: DISCONTINUED | OUTPATIENT
Start: 2024-01-01 | End: 2024-01-01

## 2024-01-01 RX ORDER — MIDAZOLAM HYDROCHLORIDE 1 MG/ML
2 INJECTION INTRAMUSCULAR; INTRAVENOUS ONCE
Status: COMPLETED | OUTPATIENT
Start: 2024-01-01 | End: 2024-01-01

## 2024-01-01 RX ORDER — POTASSIUM CHLORIDE 29.8 MG/ML
20 INJECTION INTRAVENOUS
Status: COMPLETED | OUTPATIENT
Start: 2024-01-01 | End: 2024-01-01

## 2024-01-01 RX ORDER — MIDAZOLAM HYDROCHLORIDE 1 MG/ML
INJECTION INTRAMUSCULAR; INTRAVENOUS
Status: DISCONTINUED | OUTPATIENT
Start: 2024-01-01 | End: 2024-01-01 | Stop reason: HOSPADM

## 2024-01-01 RX ORDER — METRONIDAZOLE 500 MG/100ML
500 INJECTION, SOLUTION INTRAVENOUS EVERY 8 HOURS
Qty: 2100 ML | Refills: 0 | Status: DISCONTINUED | OUTPATIENT
Start: 2024-01-01 | End: 2024-01-01

## 2024-01-01 RX ORDER — LIDOCAINE HYDROCHLORIDE 10 MG/ML
INJECTION, SOLUTION EPIDURAL; INFILTRATION; INTRACAUDAL; PERINEURAL
Status: DISCONTINUED | OUTPATIENT
Start: 2024-01-01 | End: 2024-01-01 | Stop reason: HOSPADM

## 2024-01-01 RX ORDER — ESMOLOL HYDROCHLORIDE 10 MG/ML
25-300 INJECTION, SOLUTION INTRAVENOUS
Status: DISCONTINUED | OUTPATIENT
Start: 2024-01-01 | End: 2024-01-01

## 2024-01-01 RX ADMIN — INSULIN DETEMIR 20 UNITS: 100 INJECTION, SOLUTION SUBCUTANEOUS at 09:00

## 2024-01-01 RX ADMIN — FENTANYL CITRATE 50 MCG: 50 INJECTION, SOLUTION INTRAMUSCULAR; INTRAVENOUS at 01:36

## 2024-01-01 RX ADMIN — INSULIN HUMAN 3 UNITS: 100 INJECTION, SOLUTION PARENTERAL at 18:31

## 2024-01-01 RX ADMIN — PROPOFOL 25 MCG/KG/MIN: 10 INJECTION, EMULSION INTRAVENOUS at 01:58

## 2024-01-01 RX ADMIN — CHLORHEXIDINE GLUCONATE 15 ML: 1.2 SOLUTION ORAL at 09:00

## 2024-01-01 RX ADMIN — Medication 10 ML: at 20:19

## 2024-01-01 RX ADMIN — NICARDIPINE HYDROCHLORIDE 10 MG/HR: 25 INJECTION, SOLUTION INTRAVENOUS at 01:36

## 2024-01-01 RX ADMIN — SODIUM BICARBONATE 100 MEQ: 84 INJECTION INTRAVENOUS at 18:24

## 2024-01-01 RX ADMIN — Medication 50 MCG/HR: at 16:11

## 2024-01-01 RX ADMIN — NICARDIPINE HYDROCHLORIDE 10 MG/HR: 25 INJECTION, SOLUTION INTRAVENOUS at 23:43

## 2024-01-01 RX ADMIN — FENTANYL CITRATE 50 MCG: 50 INJECTION, SOLUTION INTRAMUSCULAR; INTRAVENOUS at 19:32

## 2024-01-01 RX ADMIN — FUROSEMIDE 40 MG: 10 INJECTION, SOLUTION INTRAMUSCULAR; INTRAVENOUS at 21:22

## 2024-01-01 RX ADMIN — NICARDIPINE HYDROCHLORIDE 10 MG/HR: 25 INJECTION, SOLUTION INTRAVENOUS at 12:09

## 2024-01-01 RX ADMIN — Medication 10 ML: at 01:51

## 2024-01-01 RX ADMIN — SODIUM BICARBONATE 150 MEQ: 84 INJECTION INTRAVENOUS at 20:03

## 2024-01-01 RX ADMIN — ACETAMINOPHEN 650 MG: 325 TABLET ORAL at 13:15

## 2024-01-01 RX ADMIN — NICARDIPINE HYDROCHLORIDE 10 MG/HR: 25 INJECTION, SOLUTION INTRAVENOUS at 04:14

## 2024-01-01 RX ADMIN — INSULIN HUMAN 5 UNITS: 100 INJECTION, SOLUTION PARENTERAL at 12:01

## 2024-01-01 RX ADMIN — METRONIDAZOLE 500 MG: 500 INJECTION, SOLUTION INTRAVENOUS at 06:11

## 2024-01-01 RX ADMIN — SODIUM CHLORIDE 1000 MG: 900 INJECTION INTRAVENOUS at 08:27

## 2024-01-01 RX ADMIN — PROPOFOL 30 MCG/KG/MIN: 10 INJECTION, EMULSION INTRAVENOUS at 17:58

## 2024-01-01 RX ADMIN — INSULIN HUMAN 5 UNITS: 100 INJECTION, SOLUTION PARENTERAL at 12:10

## 2024-01-01 RX ADMIN — INSULIN HUMAN 3 UNITS: 100 INJECTION, SOLUTION PARENTERAL at 06:10

## 2024-01-01 RX ADMIN — PROPOFOL 45 MCG/KG/MIN: 10 INJECTION, EMULSION INTRAVENOUS at 16:52

## 2024-01-01 RX ADMIN — INSULIN HUMAN 8 UNITS: 100 INJECTION, SOLUTION PARENTERAL at 06:19

## 2024-01-01 RX ADMIN — CHLORHEXIDINE GLUCONATE 15 ML: 1.2 SOLUTION ORAL at 21:07

## 2024-01-01 RX ADMIN — SODIUM CHLORIDE 15 ML/HR: 3 INJECTION, SOLUTION INTRAVENOUS at 10:41

## 2024-01-01 RX ADMIN — CHLORHEXIDINE GLUCONATE 15 ML: 1.2 SOLUTION ORAL at 08:26

## 2024-01-01 RX ADMIN — CHLORHEXIDINE GLUCONATE 15 ML: 1.2 SOLUTION ORAL at 20:10

## 2024-01-01 RX ADMIN — PROPOFOL 40 MCG/KG/MIN: 10 INJECTION, EMULSION INTRAVENOUS at 19:30

## 2024-01-01 RX ADMIN — INSULIN HUMAN 5 UNITS: 100 INJECTION, SOLUTION PARENTERAL at 17:45

## 2024-01-01 RX ADMIN — POTASSIUM CHLORIDE 20 MEQ: 400 INJECTION, SOLUTION INTRAVENOUS at 22:07

## 2024-01-01 RX ADMIN — MIDAZOLAM HYDROCHLORIDE 5 MG: 1 INJECTION, SOLUTION INTRAMUSCULAR; INTRAVENOUS at 21:11

## 2024-01-01 RX ADMIN — NICARDIPINE HYDROCHLORIDE 7.5 MG/HR: 25 INJECTION, SOLUTION INTRAVENOUS at 12:32

## 2024-01-01 RX ADMIN — SODIUM BICARBONATE 150 MEQ: 84 INJECTION INTRAVENOUS at 05:29

## 2024-01-01 RX ADMIN — SENNOSIDES AND DOCUSATE SODIUM 2 TABLET: 8.6; 5 TABLET ORAL at 09:31

## 2024-01-01 RX ADMIN — INSULIN DETEMIR 20 UNITS: 100 INJECTION, SOLUTION SUBCUTANEOUS at 08:27

## 2024-01-01 RX ADMIN — POTASSIUM CHLORIDE 20 MEQ: 400 INJECTION, SOLUTION INTRAVENOUS at 20:33

## 2024-01-01 RX ADMIN — ACETAMINOPHEN 650 MG: 325 TABLET ORAL at 22:38

## 2024-01-01 RX ADMIN — ACETAMINOPHEN 650 MG: 325 TABLET ORAL at 11:50

## 2024-01-01 RX ADMIN — FENTANYL CITRATE 50 MCG: 50 INJECTION, SOLUTION INTRAMUSCULAR; INTRAVENOUS at 06:38

## 2024-01-01 RX ADMIN — SODIUM CHLORIDE 1000 MG: 900 INJECTION INTRAVENOUS at 09:25

## 2024-01-01 RX ADMIN — GLYCOPYRROLATE 0.4 MG: 0.2 INJECTION INTRAMUSCULAR; INTRAVENOUS at 15:40

## 2024-01-01 RX ADMIN — LABETALOL HYDROCHLORIDE 20 MG: 5 INJECTION, SOLUTION INTRAVENOUS at 21:44

## 2024-01-01 RX ADMIN — NICARDIPINE HYDROCHLORIDE 7.5 MG/HR: 25 INJECTION, SOLUTION INTRAVENOUS at 17:33

## 2024-01-01 RX ADMIN — Medication 10 ML: at 08:26

## 2024-01-01 RX ADMIN — NICARDIPINE HYDROCHLORIDE 12.5 MG/HR: 25 INJECTION, SOLUTION INTRAVENOUS at 09:29

## 2024-01-01 RX ADMIN — NICARDIPINE HYDROCHLORIDE 5 MG/HR: 25 INJECTION, SOLUTION INTRAVENOUS at 03:24

## 2024-01-01 RX ADMIN — LABETALOL HYDROCHLORIDE 20 MG: 5 INJECTION, SOLUTION INTRAVENOUS at 01:19

## 2024-01-01 RX ADMIN — SODIUM CHLORIDE 1000 MG: 900 INJECTION INTRAVENOUS at 09:30

## 2024-01-01 RX ADMIN — PROPOFOL 20 MCG/KG/MIN: 10 INJECTION, EMULSION INTRAVENOUS at 06:24

## 2024-01-01 RX ADMIN — NICARDIPINE HYDROCHLORIDE 5 MG/HR: 25 INJECTION, SOLUTION INTRAVENOUS at 02:46

## 2024-01-01 RX ADMIN — FENTANYL CITRATE 50 MCG: 50 INJECTION, SOLUTION INTRAMUSCULAR; INTRAVENOUS at 22:54

## 2024-01-01 RX ADMIN — NICARDIPINE HYDROCHLORIDE 15 MG/HR: 25 INJECTION, SOLUTION INTRAVENOUS at 00:35

## 2024-01-01 RX ADMIN — CHLORHEXIDINE GLUCONATE 15 ML: 1.2 SOLUTION ORAL at 20:32

## 2024-01-01 RX ADMIN — ACETAMINOPHEN 650 MG: 325 TABLET ORAL at 16:51

## 2024-01-01 RX ADMIN — METRONIDAZOLE 500 MG: 500 INJECTION, SOLUTION INTRAVENOUS at 21:51

## 2024-01-01 RX ADMIN — PROPOFOL 45 MCG/KG/MIN: 10 INJECTION, EMULSION INTRAVENOUS at 01:19

## 2024-01-01 RX ADMIN — PANTOPRAZOLE SODIUM 40 MG: 40 INJECTION, POWDER, FOR SOLUTION INTRAVENOUS at 08:26

## 2024-01-01 RX ADMIN — Medication 10 ML: at 08:08

## 2024-01-01 RX ADMIN — ATORVASTATIN CALCIUM 80 MG: 40 TABLET, FILM COATED ORAL at 20:33

## 2024-01-01 RX ADMIN — INSULIN HUMAN 5 UNITS: 100 INJECTION, SOLUTION PARENTERAL at 00:08

## 2024-01-01 RX ADMIN — CHLORHEXIDINE GLUCONATE 15 ML: 1.2 SOLUTION ORAL at 09:31

## 2024-01-01 RX ADMIN — NICARDIPINE HYDROCHLORIDE 7.5 MG/HR: 25 INJECTION, SOLUTION INTRAVENOUS at 20:10

## 2024-01-01 RX ADMIN — PANTOPRAZOLE SODIUM 40 MG: 40 INJECTION, POWDER, FOR SOLUTION INTRAVENOUS at 08:27

## 2024-01-01 RX ADMIN — METOCLOPRAMIDE 10 MG: 5 INJECTION, SOLUTION INTRAMUSCULAR; INTRAVENOUS at 20:24

## 2024-01-01 RX ADMIN — SENNOSIDES AND DOCUSATE SODIUM 2 TABLET: 8.6; 5 TABLET ORAL at 08:59

## 2024-01-01 RX ADMIN — NICARDIPINE HYDROCHLORIDE 12.5 MG/HR: 25 INJECTION, SOLUTION INTRAVENOUS at 04:23

## 2024-01-01 RX ADMIN — LABETALOL HYDROCHLORIDE 20 MG: 5 INJECTION, SOLUTION INTRAVENOUS at 14:49

## 2024-01-01 RX ADMIN — PROPOFOL 45 MCG/KG/MIN: 10 INJECTION, EMULSION INTRAVENOUS at 11:34

## 2024-01-01 RX ADMIN — NICARDIPINE HYDROCHLORIDE 7.5 MG/HR: 25 INJECTION, SOLUTION INTRAVENOUS at 09:28

## 2024-01-01 RX ADMIN — INSULIN HUMAN 5 UNITS: 100 INJECTION, SOLUTION PARENTERAL at 23:52

## 2024-01-01 RX ADMIN — METRONIDAZOLE 500 MG: 500 INJECTION, SOLUTION INTRAVENOUS at 06:18

## 2024-01-01 RX ADMIN — FENTANYL CITRATE 50 MCG: 50 INJECTION, SOLUTION INTRAMUSCULAR; INTRAVENOUS at 20:11

## 2024-01-01 RX ADMIN — PANTOPRAZOLE SODIUM 40 MG: 40 INJECTION, POWDER, FOR SOLUTION INTRAVENOUS at 09:30

## 2024-01-01 RX ADMIN — NICARDIPINE HYDROCHLORIDE 12.5 MG/HR: 25 INJECTION, SOLUTION INTRAVENOUS at 12:07

## 2024-01-01 RX ADMIN — PANTOPRAZOLE SODIUM 40 MG: 40 INJECTION, POWDER, FOR SOLUTION INTRAVENOUS at 08:35

## 2024-01-01 RX ADMIN — PROPOFOL 45 MCG/KG/MIN: 10 INJECTION, EMULSION INTRAVENOUS at 20:10

## 2024-01-01 RX ADMIN — INSULIN HUMAN 8 UNITS: 100 INJECTION, SOLUTION PARENTERAL at 18:45

## 2024-01-01 RX ADMIN — NICARDIPINE HYDROCHLORIDE 10 MG/HR: 25 INJECTION, SOLUTION INTRAVENOUS at 20:18

## 2024-01-01 RX ADMIN — SODIUM CHLORIDE 20 ML/HR: 3 INJECTION, SOLUTION INTRAVENOUS at 07:50

## 2024-01-01 RX ADMIN — PANTOPRAZOLE SODIUM 40 MG: 40 INJECTION, POWDER, FOR SOLUTION INTRAVENOUS at 09:00

## 2024-01-01 RX ADMIN — NICARDIPINE HYDROCHLORIDE 10 MG/HR: 25 INJECTION, SOLUTION INTRAVENOUS at 23:57

## 2024-01-01 RX ADMIN — INSULIN HUMAN 10 UNITS: 100 INJECTION, SOLUTION PARENTERAL at 11:31

## 2024-01-01 RX ADMIN — NICARDIPINE HYDROCHLORIDE 10 MG/HR: 25 INJECTION, SOLUTION INTRAVENOUS at 20:40

## 2024-01-01 RX ADMIN — PROPOFOL 30 MCG/KG/MIN: 10 INJECTION, EMULSION INTRAVENOUS at 23:13

## 2024-01-01 RX ADMIN — INSULIN HUMAN 5 UNITS: 100 INJECTION, SOLUTION PARENTERAL at 18:46

## 2024-01-01 RX ADMIN — INSULIN HUMAN 14 UNITS: 100 INJECTION, SOLUTION PARENTERAL at 01:37

## 2024-01-01 RX ADMIN — ETOMIDATE 20 MG: 2 INJECTION, SOLUTION INTRAVENOUS at 17:53

## 2024-01-01 RX ADMIN — ATORVASTATIN CALCIUM 80 MG: 40 TABLET, FILM COATED ORAL at 20:02

## 2024-01-01 RX ADMIN — PROPOFOL 45 MCG/KG/MIN: 10 INJECTION, EMULSION INTRAVENOUS at 05:21

## 2024-01-01 RX ADMIN — INSULIN HUMAN 8 UNITS: 100 INJECTION, SOLUTION PARENTERAL at 12:01

## 2024-01-01 RX ADMIN — INSULIN HUMAN 5 UNITS: 100 INJECTION, SOLUTION PARENTERAL at 06:10

## 2024-01-01 RX ADMIN — INSULIN HUMAN 5 UNITS: 100 INJECTION, SOLUTION PARENTERAL at 23:47

## 2024-01-01 RX ADMIN — NICARDIPINE HYDROCHLORIDE 5 MG/HR: 25 INJECTION, SOLUTION INTRAVENOUS at 12:37

## 2024-01-01 RX ADMIN — INSULIN HUMAN 10 UNITS: 100 INJECTION, SOLUTION PARENTERAL at 00:39

## 2024-01-01 RX ADMIN — SENNOSIDES AND DOCUSATE SODIUM 2 TABLET: 8.6; 5 TABLET ORAL at 20:33

## 2024-01-01 RX ADMIN — CHLORHEXIDINE GLUCONATE 15 ML: 1.2 SOLUTION ORAL at 08:36

## 2024-01-01 RX ADMIN — FENTANYL CITRATE 50 MCG: 50 INJECTION, SOLUTION INTRAMUSCULAR; INTRAVENOUS at 13:39

## 2024-01-01 RX ADMIN — INSULIN HUMAN 3 UNITS: 100 INJECTION, SOLUTION PARENTERAL at 17:47

## 2024-01-01 RX ADMIN — NICARDIPINE HYDROCHLORIDE 5 MG/HR: 25 INJECTION, SOLUTION INTRAVENOUS at 09:00

## 2024-01-01 RX ADMIN — ATORVASTATIN CALCIUM 80 MG: 40 TABLET, FILM COATED ORAL at 21:07

## 2024-01-01 RX ADMIN — SENNOSIDES AND DOCUSATE SODIUM 2 TABLET: 8.6; 5 TABLET ORAL at 21:07

## 2024-01-01 RX ADMIN — NICARDIPINE HYDROCHLORIDE 10 MG/HR: 25 INJECTION, SOLUTION INTRAVENOUS at 23:12

## 2024-01-01 RX ADMIN — NICARDIPINE HYDROCHLORIDE 10 MG/HR: 25 INJECTION, SOLUTION INTRAVENOUS at 06:49

## 2024-01-01 RX ADMIN — PROPOFOL 45 MCG/KG/MIN: 10 INJECTION, EMULSION INTRAVENOUS at 20:42

## 2024-01-01 RX ADMIN — METRONIDAZOLE 500 MG: 500 INJECTION, SOLUTION INTRAVENOUS at 21:44

## 2024-01-01 RX ADMIN — METRONIDAZOLE 500 MG: 500 INJECTION, SOLUTION INTRAVENOUS at 13:39

## 2024-01-01 RX ADMIN — INSULIN HUMAN 8 UNITS: 100 INJECTION, SOLUTION PARENTERAL at 06:08

## 2024-01-01 RX ADMIN — NICARDIPINE HYDROCHLORIDE 12.5 MG/HR: 25 INJECTION, SOLUTION INTRAVENOUS at 07:04

## 2024-01-01 RX ADMIN — NICARDIPINE HYDROCHLORIDE 12.5 MG/HR: 25 INJECTION, SOLUTION INTRAVENOUS at 14:10

## 2024-01-01 RX ADMIN — SENNOSIDES AND DOCUSATE SODIUM 2 TABLET: 8.6; 5 TABLET ORAL at 20:41

## 2024-01-01 RX ADMIN — NICARDIPINE HYDROCHLORIDE 15 MG/HR: 25 INJECTION, SOLUTION INTRAVENOUS at 17:03

## 2024-01-01 RX ADMIN — NICARDIPINE HYDROCHLORIDE 15 MG/HR: 25 INJECTION, SOLUTION INTRAVENOUS at 02:37

## 2024-01-01 RX ADMIN — MIDAZOLAM HYDROCHLORIDE 2 MG: 1 INJECTION, SOLUTION INTRAMUSCULAR; INTRAVENOUS at 17:53

## 2024-01-01 RX ADMIN — FENTANYL CITRATE 50 MCG: 50 INJECTION, SOLUTION INTRAMUSCULAR; INTRAVENOUS at 06:37

## 2024-01-01 RX ADMIN — INSULIN HUMAN 10 UNITS: 100 INJECTION, SOLUTION PARENTERAL at 11:44

## 2024-01-01 RX ADMIN — SODIUM CHLORIDE 15 ML/HR: 3 INJECTION, SOLUTION INTRAVENOUS at 02:35

## 2024-01-01 RX ADMIN — ESMOLOL HYDROCHLORIDE 25 MCG/KG/MIN: 10 INJECTION INTRAVENOUS at 00:35

## 2024-01-01 RX ADMIN — NICARDIPINE HYDROCHLORIDE 5 MG/HR: 25 INJECTION, SOLUTION INTRAVENOUS at 03:06

## 2024-01-01 RX ADMIN — FENTANYL CITRATE 50 MCG: 50 INJECTION, SOLUTION INTRAMUSCULAR; INTRAVENOUS at 19:53

## 2024-01-01 RX ADMIN — ATROPINE SULFATE: 0.1 INJECTION INTRAVENOUS at 17:22

## 2024-01-01 RX ADMIN — INSULIN HUMAN 3 UNITS: 100 INJECTION, SOLUTION PARENTERAL at 23:52

## 2024-01-01 RX ADMIN — SENNOSIDES AND DOCUSATE SODIUM 2 TABLET: 8.6; 5 TABLET ORAL at 08:36

## 2024-01-01 RX ADMIN — LABETALOL HYDROCHLORIDE 20 MG: 5 INJECTION, SOLUTION INTRAVENOUS at 12:15

## 2024-01-01 RX ADMIN — INSULIN HUMAN 5 UNITS: 100 INJECTION, SOLUTION PARENTERAL at 06:31

## 2024-01-01 RX ADMIN — ATORVASTATIN CALCIUM 80 MG: 40 TABLET, FILM COATED ORAL at 20:11

## 2024-01-01 RX ADMIN — INSULIN HUMAN 8 UNITS: 100 INJECTION, SOLUTION PARENTERAL at 00:39

## 2024-01-01 RX ADMIN — FENTANYL CITRATE 50 MCG: 50 INJECTION, SOLUTION INTRAMUSCULAR; INTRAVENOUS at 00:35

## 2024-01-01 RX ADMIN — SENNOSIDES AND DOCUSATE SODIUM 2 TABLET: 8.6; 5 TABLET ORAL at 20:02

## 2024-01-01 RX ADMIN — PROPOFOL 45 MCG/KG/MIN: 10 INJECTION, EMULSION INTRAVENOUS at 05:41

## 2024-01-01 RX ADMIN — PROPOFOL 45 MCG/KG/MIN: 10 INJECTION, EMULSION INTRAVENOUS at 01:37

## 2024-01-01 RX ADMIN — ESMOLOL HYDROCHLORIDE 25 MCG/KG/MIN: 10 INJECTION INTRAVENOUS at 20:24

## 2024-01-01 RX ADMIN — IOPAMIDOL 115 ML: 755 INJECTION, SOLUTION INTRAVENOUS at 21:26

## 2024-01-01 RX ADMIN — INSULIN HUMAN 10 UNITS: 100 INJECTION, SOLUTION PARENTERAL at 06:19

## 2024-01-01 RX ADMIN — CHLORHEXIDINE GLUCONATE 15 ML: 1.2 SOLUTION ORAL at 08:27

## 2024-01-01 RX ADMIN — INSULIN DETEMIR 10 UNITS: 100 INJECTION, SOLUTION SUBCUTANEOUS at 10:18

## 2024-01-01 RX ADMIN — SENNOSIDES AND DOCUSATE SODIUM 2 TABLET: 8.6; 5 TABLET ORAL at 08:27

## 2024-01-01 RX ADMIN — INSULIN LISPRO 20 UNITS: 100 INJECTION, SOLUTION INTRAVENOUS; SUBCUTANEOUS at 06:30

## 2024-01-01 RX ADMIN — INSULIN HUMAN 8 UNITS: 100 INJECTION, SOLUTION PARENTERAL at 05:36

## 2024-01-01 RX ADMIN — NICARDIPINE HYDROCHLORIDE 10 MG/HR: 25 INJECTION, SOLUTION INTRAVENOUS at 14:48

## 2024-01-01 RX ADMIN — PROPOFOL 20 MCG/KG/MIN: 10 INJECTION, EMULSION INTRAVENOUS at 12:37

## 2024-01-01 RX ADMIN — NICARDIPINE HYDROCHLORIDE 10 MG/HR: 25 INJECTION, SOLUTION INTRAVENOUS at 09:36

## 2024-01-01 RX ADMIN — PANTOPRAZOLE SODIUM 40 MG: 40 INJECTION, POWDER, FOR SOLUTION INTRAVENOUS at 20:18

## 2024-01-01 RX ADMIN — NICARDIPINE HYDROCHLORIDE 5 MG/HR: 25 INJECTION, SOLUTION INTRAVENOUS at 08:41

## 2024-01-01 RX ADMIN — NICARDIPINE HYDROCHLORIDE 10 MG/HR: 25 INJECTION, SOLUTION INTRAVENOUS at 21:17

## 2024-01-01 RX ADMIN — INSULIN DETEMIR 20 UNITS: 100 INJECTION, SOLUTION SUBCUTANEOUS at 08:36

## 2024-01-01 RX ADMIN — NICARDIPINE HYDROCHLORIDE 5 MG/HR: 25 INJECTION, SOLUTION INTRAVENOUS at 14:17

## 2024-01-01 RX ADMIN — ACETAMINOPHEN 650 MG: 325 TABLET ORAL at 20:32

## 2024-01-01 RX ADMIN — INSULIN DETEMIR 10 UNITS: 100 INJECTION, SOLUTION SUBCUTANEOUS at 01:37

## 2024-01-01 RX ADMIN — ASPIRIN 300 MG: 300 SUPPOSITORY RECTAL at 08:02

## 2024-01-01 RX ADMIN — INSULIN HUMAN 8 UNITS: 100 INJECTION, SOLUTION PARENTERAL at 11:44

## 2024-01-01 RX ADMIN — CHLORHEXIDINE GLUCONATE 15 ML: 1.2 SOLUTION ORAL at 20:19

## 2024-01-01 RX ADMIN — SODIUM CHLORIDE 100 ML/HR: 9 INJECTION, SOLUTION INTRAVENOUS at 01:38

## 2024-01-01 RX ADMIN — SODIUM CHLORIDE 1000 MG: 900 INJECTION INTRAVENOUS at 08:35

## 2024-01-01 RX ADMIN — INSULIN DETEMIR 10 UNITS: 100 INJECTION, SOLUTION SUBCUTANEOUS at 06:30

## 2024-01-01 RX ADMIN — NICARDIPINE HYDROCHLORIDE 5 MG/HR: 25 INJECTION, SOLUTION INTRAVENOUS at 16:51

## 2024-01-01 RX ADMIN — SODIUM CHLORIDE 1000 MG: 900 INJECTION INTRAVENOUS at 08:59

## 2024-01-01 RX ADMIN — ACETAMINOPHEN 650 MG: 325 TABLET ORAL at 23:52

## 2024-01-01 RX ADMIN — NICARDIPINE HYDROCHLORIDE 15 MG/HR: 25 INJECTION, SOLUTION INTRAVENOUS at 18:29

## 2024-01-01 RX ADMIN — NICARDIPINE HYDROCHLORIDE 5 MG/HR: 25 INJECTION, SOLUTION INTRAVENOUS at 13:15

## 2024-01-01 RX ADMIN — METRONIDAZOLE 500 MG: 500 INJECTION, SOLUTION INTRAVENOUS at 14:17

## 2024-01-01 RX ADMIN — FENTANYL CITRATE 50 MCG: 50 INJECTION, SOLUTION INTRAMUSCULAR; INTRAVENOUS at 10:41

## 2024-01-01 RX ADMIN — PROPOFOL 45 MCG/KG/MIN: 10 INJECTION, EMULSION INTRAVENOUS at 11:59

## 2024-01-01 RX ADMIN — Medication 10 ML: at 11:04

## 2024-01-01 RX ADMIN — HYDRALAZINE HYDROCHLORIDE 20 MG: 20 INJECTION INTRAMUSCULAR; INTRAVENOUS at 18:11

## 2024-01-01 RX ADMIN — INSULIN HUMAN 1.6 UNITS/HR: 1 INJECTION, SOLUTION INTRAVENOUS at 10:49

## 2024-01-08 ENCOUNTER — PRIOR AUTHORIZATION (OUTPATIENT)
Dept: ENDOCRINOLOGY | Facility: CLINIC | Age: 64
End: 2024-01-08
Payer: MEDICARE

## 2024-01-08 ENCOUNTER — PRIOR AUTHORIZATION (OUTPATIENT)
Dept: INTERNAL MEDICINE | Facility: CLINIC | Age: 64
End: 2024-01-08
Payer: MEDICARE

## 2024-01-08 RX ORDER — PROCHLORPERAZINE 25 MG/1
SUPPOSITORY RECTAL
Qty: 1 EACH | Refills: 0 | Status: SHIPPED | OUTPATIENT
Start: 2024-01-08 | End: 2024-01-09 | Stop reason: SDUPTHER

## 2024-01-08 NOTE — TELEPHONE ENCOUNTER
Rx Refill Note  Requested Prescriptions     Pending Prescriptions Disp Refills    Continuous Blood Gluc Transmit (Dexcom G6 Transmitter) misc [Pharmacy Med Name: DEXCOM G6 TRANSMIT  MIS] 1 each 0     Sig: USE ONE EVERY 3 MONTHS      Last office visit with prescribing clinician: 10/27/2023     Next office visit with prescribing clinician: 3/7/2024

## 2024-01-09 RX ORDER — PROCHLORPERAZINE 25 MG/1
1 SUPPOSITORY RECTAL
Qty: 1 EACH | Refills: 3 | Status: SHIPPED | OUTPATIENT
Start: 2024-01-09

## 2024-01-09 RX ORDER — INSULIN LISPRO 100 [IU]/ML
2-7 INJECTION, SOLUTION INTRAVENOUS; SUBCUTANEOUS
Qty: 10 ML | Refills: 2 | Status: SHIPPED | OUTPATIENT
Start: 2024-01-09

## 2024-01-09 NOTE — TELEPHONE ENCOUNTER
RX REFILL  Continuous Blood Gluc Transmit (Dexcom G6 Transmitter) misc     AND Sensors    Insulin Lispro (humaLOG) 100 UNIT/ML injection     To send to:  [facility] [address] [place] [address] [phone] [facility] [phone] FX    Changed insurance from Medicaid to [employer]

## 2024-01-15 ENCOUNTER — TELEPHONE (OUTPATIENT)
Dept: ENDOCRINOLOGY | Facility: CLINIC | Age: 64
End: 2024-01-15
Payer: MEDICARE

## 2024-01-15 NOTE — TELEPHONE ENCOUNTER
PATIENTS INSURANCE IS REQUIRING PRIOR AUTH FOR PATIENTS TRANSMITTERS. SHE NEEDS US TO GET PRIOR AUTH FOR DEXCOM G6 TRANSMITTER. PATIENTS NUMBER [phone]

## 2024-01-18 ENCOUNTER — PATIENT OUTREACH (OUTPATIENT)
Dept: CASE MANAGEMENT | Facility: OTHER | Age: 64
End: 2024-01-18
Payer: MEDICARE

## 2024-01-18 NOTE — OUTREACH NOTE
Patient Outreach    SW received an incoming call from pt, who expresses confusion regarding her insurance. She recently switched to medicare and was told that she couldn't have medicaid. Her card does say she is dual eligible. SW explained the difference between medicare savings plans and medicaid as primary insurance. She was trying to get some supplies covered through medicaid, but never received them and wonders if it's because her coverage changed. SW suggested she contact the agency that was ordering the supplies to make sure. Pt also mentioned that she is in the process of applying for medicaid waiver. SW advised that the medicaid waiver may be able to cover some supplies too. Pt denies other needs at this time.    Sarah ALEJANDRO -   Ambulatory Case Management    1/18/2024, 12:45 EST

## 2024-01-19 ENCOUNTER — TELEMEDICINE (OUTPATIENT)
Dept: INTERNAL MEDICINE | Facility: CLINIC | Age: 64
End: 2024-01-19
Payer: MEDICARE

## 2024-01-19 DIAGNOSIS — G89.29 CHRONIC PAIN OF BOTH SHOULDERS: ICD-10-CM

## 2024-01-19 DIAGNOSIS — I10 ESSENTIAL HYPERTENSION: Primary | ICD-10-CM

## 2024-01-19 DIAGNOSIS — M25.512 CHRONIC PAIN OF BOTH SHOULDERS: ICD-10-CM

## 2024-01-19 DIAGNOSIS — S42.035D CLOSED NONDISPLACED FRACTURE OF ACROMIAL END OF LEFT CLAVICLE WITH ROUTINE HEALING, SUBSEQUENT ENCOUNTER: ICD-10-CM

## 2024-01-19 DIAGNOSIS — E10.65 TYPE 1 DIABETES MELLITUS WITH HYPERGLYCEMIA: ICD-10-CM

## 2024-01-19 DIAGNOSIS — M25.511 CHRONIC PAIN OF BOTH SHOULDERS: ICD-10-CM

## 2024-01-19 RX ORDER — LOSARTAN POTASSIUM 100 MG/1
100 TABLET ORAL DAILY
Qty: 90 TABLET | Refills: 1 | Status: ON HOLD | OUTPATIENT
Start: 2024-01-19

## 2024-01-19 RX ORDER — LIDOCAINE 50 MG/G
1 PATCH TOPICAL EVERY 24 HOURS
Qty: 30 PATCH | Refills: 1 | Status: SHIPPED | OUTPATIENT
Start: 2024-01-19 | End: 2024-01-25 | Stop reason: SDUPTHER

## 2024-01-19 NOTE — PROGRESS NOTES
Subjective   No chief complaint on file.     This is video visit.  You have chosen to receive care through a video visit today. Do you consent to use a video visit for your medical care today? Yes    Thelma Michael is a 63 y.o. female here today for hypertension, diabetes, and joint pain.    BP running 142/96 and higher    Blood sugar 80 to 202 but went back down. Last A1c on 9/6/23 11.1    Left fracutred clavicle. Having right should pain also so seeing physical therapy and both are getting better. Now left arm is stronger than right but right frozen shoulder just started.     I have reviewed the following portions of the patient's history and confirmed they are accurate: allergies, current medications, past family history, past medical history, past social history, past surgical history, and problem list    Review of Systems  Pertinent items are noted in HPI.       Current Outpatient Medications on File Prior to Visit   Medication Sig    acetaminophen (TYLENOL) 325 MG tablet Take 2 tablets by mouth Every 4 (Four) Hours As Needed for Mild Pain .    albuterol sulfate  (90 Base) MCG/ACT inhaler Inhale 2 puffs Every 4 (Four) Hours As Needed for Wheezing.    amLODIPine (NORVASC) 10 MG tablet Take 1 tablet by mouth Daily.    atorvastatin (Lipitor) 80 MG tablet Take 1 tablet by mouth Daily.    Blood Glucose Monitoring Suppl (FreeStyle Lite) w/Device kit 1 Device 3 (Three) Times a Day.    calcium carbonate (TUMS) 500 MG chewable tablet Chew 1,000 mg 3 (Three) Times a Day As Needed for Indigestion or Heartburn.    carvedilol (COREG) 12.5 MG tablet Take 1 tablet by mouth 2 (Two) Times a Day With Meals.    Continuous Blood Gluc Transmit (Dexcom G6 Transmitter) misc 1 each by Other route Every 3 (Three) Months.    famotidine (PEPCID) 20 MG tablet Take 1 tablet by mouth Every Morning.    ferrous sulfate 325 (65 Fe) MG tablet Take with Vitamin C or Juice (Patient taking differently: Daily With Breakfast. Take with  Vitamin C or Juice)    gabapentin (NEURONTIN) 100 MG capsule Take 1 capsule by mouth 3 (Three) Times a Day As Needed (back pain and neuropathy).    glucose blood test strip Check blood sugar 5 times daily as needed.    Insulin Infusion Pump (T:slim X2 Insulin Pump) device Use.    Insulin Lispro (humaLOG) 100 UNIT/ML injection Inject 2-7 Units under the skin into the appropriate area as directed 4 (Four) Times a Day Before Meals & at Bedtime.    lactobacillus acidophilus (RISAQUAD) capsule capsule Take 1 capsule by mouth Daily.    melatonin 5 MG tablet tablet Take 1 tablet by mouth At Night As Needed (sleep).    mirtazapine (REMERON) 7.5 MG tablet Take 1 tablet by mouth Every Night.    Multivitamin tablet tablet Take 1 tablet by mouth Daily.    prochlorperazine (COMPAZINE) 5 MG tablet Take 1 tablet by mouth Every 6 (Six) Hours As Needed for Nausea or Vomiting.    rivaroxaban (XARELTO) 15 MG tablet Take 1 tablet by mouth Daily With Dinner    terazosin (HYTRIN) 1 MG capsule Take 1 capsule by mouth Every Night.    traMADol (ULTRAM) 50 MG tablet Take 1 tablet by mouth 2 (Two) Times a Day As Needed for Moderate Pain.    vitamin D (ERGOCALCIFEROL) 1.25 MG (77149 UT) capsule capsule Take 1 capsule by mouth 1 (One) Time Per Week.    [DISCONTINUED] Insulin Glargine (BASAGLAR KWIKPEN) 100 UNIT/ML injection pen Inject 10 Units under the skin into the appropriate area as directed Every Night.     No current facility-administered medications on file prior to visit.       Objective   There were no vitals filed for this visit.  There is no height or weight on file to calculate BMI.    Physical Exam  Constitutional:       Appearance: Normal appearance. She is well-developed.   HENT:      Head: Normocephalic and atraumatic.      Nose: Nose normal.   Eyes:      General: Lids are normal.      Conjunctiva/sclera: Conjunctivae normal.   Neck:      Trachea: Trachea normal.   Pulmonary:      Effort: No respiratory distress.   Neurological:       Mental Status: She is alert and oriented to person, place, and time.      GCS: GCS eye subscore is 4. GCS verbal subscore is 5. GCS motor subscore is 6.   Psychiatric:         Attention and Perception: Attention normal.         Mood and Affect: Mood normal.         Speech: Speech normal.         Behavior: Behavior normal. Behavior is cooperative.         Thought Content: Thought content normal.         Assessment & Plan   Problem List Items Addressed This Visit       Type 1 diabetes mellitus with hyperglycemia  Chronic unstable. Continue humalog insulin pump. Will complete A1c and Cmp in 2 weeks at follow up. Follow diabetic diet.      Overview     dx'd age 38 .          Clavicle fracture  New unstable. Continue lidoderm patches. Continue tramadol as needed. Continue follow ups with PT and orthopedics.      Other Visit Diagnoses       Essential hypertension    -  Primary  Chronic unstable. Increase losartan. Continue amlodipine and carvedilol. Follow up in 2 weeks for BP check and will get fasting lipid panel and CMP completed.     Relevant Medications    losartan (COZAAR) 100 MG tablet    Chronic pain of both shoulders      Chronic unstable. Continue lidoderm patches. Continue tramadol as needed. Continue follow ups with PT and orthopedics.                Current Outpatient Medications:     acetaminophen (TYLENOL) 325 MG tablet, Take 2 tablets by mouth Every 4 (Four) Hours As Needed for Mild Pain ., Disp: , Rfl:     albuterol sulfate  (90 Base) MCG/ACT inhaler, Inhale 2 puffs Every 4 (Four) Hours As Needed for Wheezing., Disp: 18 g, Rfl: 5    amLODIPine (NORVASC) 10 MG tablet, Take 1 tablet by mouth Daily., Disp: 30 tablet, Rfl: 0    atorvastatin (Lipitor) 80 MG tablet, Take 1 tablet by mouth Daily., Disp: 90 tablet, Rfl: 1    Blood Glucose Monitoring Suppl (FreeStyle Lite) w/Device kit, 1 Device 3 (Three) Times a Day., Disp: 1 kit, Rfl: 0    calcium carbonate (TUMS) 500 MG chewable tablet, Chew 1,000  mg 3 (Three) Times a Day As Needed for Indigestion or Heartburn., Disp: , Rfl:     carvedilol (COREG) 12.5 MG tablet, Take 1 tablet by mouth 2 (Two) Times a Day With Meals., Disp: , Rfl:     Continuous Blood Gluc Transmit (Dexcom G6 Transmitter) misc, 1 each by Other route Every 3 (Three) Months., Disp: 1 each, Rfl: 3    famotidine (PEPCID) 20 MG tablet, Take 1 tablet by mouth Every Morning., Disp: , Rfl:     ferrous sulfate 325 (65 Fe) MG tablet, Take with Vitamin C or Juice (Patient taking differently: Daily With Breakfast. Take with Vitamin C or Juice), Disp: 30 tablet, Rfl: 0    gabapentin (NEURONTIN) 100 MG capsule, Take 1 capsule by mouth 3 (Three) Times a Day As Needed (back pain and neuropathy)., Disp: 90 capsule, Rfl: 2    glucose blood test strip, Check blood sugar 5 times daily as needed., Disp: 200 each, Rfl: 5    Insulin Infusion Pump (T:slim X2 Insulin Pump) device, Use., Disp: , Rfl:     Insulin Lispro (humaLOG) 100 UNIT/ML injection, Inject 2-7 Units under the skin into the appropriate area as directed 4 (Four) Times a Day Before Meals & at Bedtime., Disp: 10 mL, Rfl: 2    lactobacillus acidophilus (RISAQUAD) capsule capsule, Take 1 capsule by mouth Daily., Disp: 30 capsule, Rfl: 0    lidocaine (LIDODERM) 5 %, Place 1 patch on the skin as directed by provider Daily. No more than 1 patch per 24 hours., Disp: , Rfl:     losartan (COZAAR) 100 MG tablet, Take 1 tablet by mouth Daily., Disp: 90 tablet, Rfl: 1    melatonin 5 MG tablet tablet, Take 1 tablet by mouth At Night As Needed (sleep)., Disp: , Rfl:     mirtazapine (REMERON) 7.5 MG tablet, Take 1 tablet by mouth Every Night., Disp: 30 tablet, Rfl: 5    Multivitamin tablet tablet, Take 1 tablet by mouth Daily., Disp: 30 tablet, Rfl: 0    prochlorperazine (COMPAZINE) 5 MG tablet, Take 1 tablet by mouth Every 6 (Six) Hours As Needed for Nausea or Vomiting., Disp: , Rfl:     rivaroxaban (XARELTO) 15 MG tablet, Take 1 tablet by mouth Daily With Dinner,  Disp: 42 tablet, Rfl: 2    terazosin (HYTRIN) 1 MG capsule, Take 1 capsule by mouth Every Night., Disp: 30 capsule, Rfl: 0    traMADol (ULTRAM) 50 MG tablet, Take 1 tablet by mouth 2 (Two) Times a Day As Needed for Moderate Pain., Disp: 60 tablet, Rfl: 2    vitamin D (ERGOCALCIFEROL) 1.25 MG (75133 UT) capsule capsule, Take 1 capsule by mouth 1 (One) Time Per Week., Disp: 4 capsule, Rfl: 2       Plan of care reviewed with the patient at the conclusion of today's visit.  Education was provided regarding diagnosis, management, and any prescribed or recommended OTC medications.  Patient verbalized understanding of and agreement with management plan.     Return in about 2 weeks (around 2/2/2024), or if symptoms worsen or fail to improve, for IN OFFICE, Follow-up.     [place] in [place]. I spent 25 minutes in medical discussion with patient during this visit.     Monet Chau, APRN

## 2024-01-22 ENCOUNTER — TELEPHONE (OUTPATIENT)
Dept: INTERNAL MEDICINE | Facility: CLINIC | Age: 64
End: 2024-01-22
Payer: MEDICARE

## 2024-01-22 NOTE — TELEPHONE ENCOUNTER
Notified pt that PA was denied. She will call pharmacy and have them run med through secondary insurance.

## 2024-01-22 NOTE — TELEPHONE ENCOUNTER
Patient called back to say the pharmacy ran both insurances and that it still needs a PA for the secondary insurance.

## 2024-01-25 ENCOUNTER — TELEPHONE (OUTPATIENT)
Dept: INTERNAL MEDICINE | Facility: CLINIC | Age: 64
End: 2024-01-25
Payer: MEDICARE

## 2024-01-25 DIAGNOSIS — S42.035D CLOSED NONDISPLACED FRACTURE OF ACROMIAL END OF LEFT CLAVICLE WITH ROUTINE HEALING, SUBSEQUENT ENCOUNTER: ICD-10-CM

## 2024-01-25 RX ORDER — LIDOCAINE 50 MG/G
1 PATCH TOPICAL EVERY 24 HOURS
Start: 2024-01-25

## 2024-01-25 RX ORDER — LIDOCAINE 50 MG/G
1 PATCH TOPICAL EVERY 24 HOURS
Qty: 30 PATCH | Refills: 1 | Status: SHIPPED | OUTPATIENT
Start: 2024-01-25 | End: 2024-01-25

## 2024-01-25 NOTE — TELEPHONE ENCOUNTER
Caller: Thelma Michael    Relationship: Self    Best call back number: [phone]    What medication are you requesting: LIDICANE PATCHES FOR BACK    What are your current symptoms: BACK PAIN    How long have you been experiencing symptoms: A WHILE    Have you had these symptoms before:    [x] Yes  [] No    Have you been treated for these symptoms before:   [x] Yes  [] No    If a prescription is needed, what is your preferred pharmacy and phone number: [facility] [address] [place] [address] [phone] [facility] [phone]      Additional notes:   PATIENT STATES YOU GIVE HER THESE FOR HER SHOULDER AND WAS WONDERING IF SHE CAN HAVE THESE FOR HER BACK AS WELL

## 2024-01-26 NOTE — TELEPHONE ENCOUNTER
Contact patient and advise she can only use one patch in a 24 hour period. She can alternate this between her back and shoulder but she cannot use on both areas in one day.

## 2024-02-05 PROBLEM — I63.9 CVA (CEREBRAL VASCULAR ACCIDENT): Status: ACTIVE | Noted: 2024-02-05

## 2024-02-06 NOTE — PLAN OF CARE
Goal Outcome Evaluation:  Plan of Care Reviewed With: patient           Outcome Evaluation: PT initial evaluation completed for pt s/p mechanical thrombectomy for R MCA CVA presenting with L sided weakness, impaired balance and coordination, increased lethargy, poor command following, and decreased functional mobility. Pt required maxA x2 for STS transfers. Pt's decreased independence warrants PT skilled care. Recommend D/C to IP rehab facility.      Anticipated Discharge Disposition (PT): inpatient rehabilitation facility

## 2024-02-06 NOTE — PROGRESS NOTES
Neurology Note    Patient:  Thelma Michael    YOB: 1960    REFERRING PHYSICIAN:  Dr. Price    CHIEF COMPLAINT:    Left-sided weakness    HISTORY OF PRESENT ILLNESS:   The patient was talking to family this am, now resting. Last NIHSS 12 at 7 am, -130s, T100.1 at noon.    Past Medical History:  Past Medical History:   Diagnosis Date    Acid reflux     Acute bronchitis     Cardiac murmur     Depression with anxiety 10/05/2020    Diabetes mellitus     Diabetes mellitus type I     Gastroesophageal reflux disease 05/13/2016    H/O echocardiogram 08/07/2012    i. LVEF 65%.ii. Mild LVH.iii. Borderline evidence of atrial septal aneurysm.  No PFO.     History of nuclear stress test 08/22/2014    Negative for ischemia and scars; LVEF 77%.      History of TIAs 07/15/2021    Hyperlipidemia     Hypertension     Impacted cerumen of both ears     Migraine     Migraines 10/31/2019    Self-catheterizes urinary bladder     Sinusitis     Stroke     Tobacco abuse     quit 4 days ago.      Urticaria     Vitamin D deficiency 05/13/2016       Past Surgical History:  Past Surgical History:   Procedure Laterality Date    CAPSULE ENDOSCOPY  07/27/2021    Procedure: PILLCAM DEPLOYMENT;  Surgeon: Mikael Worthy MD;  Location:  [facility];  Service: Gastroenterology;;    COLONOSCOPY      COLONOSCOPY N/A 07/27/2021    Procedure: COLONOSCOPY;  Surgeon: Mikael Worthy MD;  Location:  [facility];  Service: Gastroenterology;  Laterality: N/A;    DENTAL PROCEDURE      ENDOSCOPY N/A 06/30/2021    Procedure: ESOPHAGOGASTRODUODENOSCOPY;  Surgeon: Brunner, Mark I, MD;  Location:  [facility];  Service: Gastroenterology;  Laterality: N/A;    ENDOSCOPY N/A 07/27/2021    Procedure: ESOPHAGOGASTRODUODENOSCOPY;  Surgeon: Mikael Worthy MD;  Location:  [facility];  Service: Gastroenterology;  Laterality: N/A;    ENDOSCOPY WITH JTUBE N/A 03/16/2022    Procedure: ESOPHAGOGASTRODUODENOSCOPY WITH  JEJUNAL TUBE INSERTION;  Surgeon: Thom Glover MD;  Location: [facility];  Service: Gastroenterology;  Laterality: N/A;    GASTRIC STIMULATOR IMPLANT SURGERY N/A     For gastropareisis    UPPER GASTROINTESTINAL ENDOSCOPY         Social History:   Social History     Socioeconomic History    Marital status:    Tobacco Use    Smoking status: Former     Packs/day: 0.25     Years: 30.00     Additional pack years: 0.00     Total pack years: 7.50     Types: Cigarettes     Quit date: 2019     Years since quittin.6    Smokeless tobacco: Never    Tobacco comments:     BC PL never smoker    Vaping Use    Vaping Use: Never used   Substance and Sexual Activity    Alcohol use: Yes     Alcohol/week: 1.0 standard drink of alcohol     Types: 1 Glasses of wine per week     Comment: social    Drug use: Not Currently     Frequency: 2.0 times per week     Types: Marijuana    Sexual activity: Not Currently     Comment: Single         Family History:   Family History   Problem Relation Age of Onset    Anxiety disorder Other     Arthritis Other     ADD / ADHD Other     Heart disease Other         cardiac disorder    Depression Other     Diabetes Other     Hyperlipidemia Other     Hypertension Other     Lung cancer Other     Osteoporosis Other     Hypertension Brother     Diabetes Brother     Hyperlipidemia Mother     Hypertension Mother     Colon cancer Neg Hx        Medications Prior to Admission:    Prior to Admission medications    Medication Sig Start Date End Date Taking? Authorizing Provider   acetaminophen (TYLENOL) 325 MG tablet Take 2 tablets by mouth Every 4 (Four) Hours As Needed for Mild Pain . 21   Marta Rodriguez PA-C   albuterol sulfate  (90 Base) MCG/ACT inhaler Inhale 2 puffs Every 4 (Four) Hours As Needed for Wheezing. 23   Jordan Millan APRN   amLODIPine (NORVASC) 10 MG tablet Take 1 tablet by mouth Daily. 23   Rajat Akers APRN   atorvastatin (Lipitor) 80 MG  tablet Take 1 tablet by mouth Daily. 10/19/23   Monet Howe APRN   Blood Glucose Monitoring Suppl (FreeStyle Lite) w/Device kit 1 Device 3 (Three) Times a Day. 8/4/23   Monet Howe APRN   calcium carbonate (TUMS) 500 MG chewable tablet Chew 1,000 mg 3 (Three) Times a Day As Needed for Indigestion or Heartburn. 5/6/21   Marta Rodriguez PA-C   carvedilol (COREG) 12.5 MG tablet Take 1 tablet by mouth 2 (Two) Times a Day With Meals. 9/18/23   Delia Triplett DO   Continuous Blood Gluc Transmit (Dexcom G6 Transmitter) misc 1 each by Other route Every 3 (Three) Months. 1/9/24   Gerson Ochoa MD   famotidine (PEPCID) 20 MG tablet Take 1 tablet by mouth Every Morning. 9/19/23   Delia Triplett DO   ferrous sulfate 325 (65 Fe) MG tablet Take with Vitamin C or Juice  Patient taking differently: Daily With Breakfast. Take with Vitamin C or Juice 7/11/23   Monet Howe APRN   gabapentin (NEURONTIN) 100 MG capsule Take 1 capsule by mouth 3 (Three) Times a Day As Needed (back pain and neuropathy). 10/19/23   Monet Howe APRN   glucose blood test strip Check blood sugar 5 times daily as needed. 6/26/23   Monet Howe APRN   Insulin Infusion Pump (T:slim X2 Insulin Pump) device Use.    Provider, MD Reddy   Insulin Lispro (humaLOG) 100 UNIT/ML injection Inject 2-7 Units under the skin into the appropriate area as directed 4 (Four) Times a Day Before Meals & at Bedtime. 1/9/24   Gerson Ochoa MD   lactobacillus acidophilus (RISAQUAD) capsule capsule Take 1 capsule by mouth Daily. 5/5/23   Rajat Akers APRN   lidocaine (LIDODERM) 5 % Place 1 patch on the skin as directed by provider Daily. No more than 1 patch per 24 hours. 1/25/24   Monet Howe APRN   losartan (COZAAR) 100 MG tablet Take 1 tablet by mouth Daily. 1/19/24   Monet Howe APRN   melatonin 5 MG tablet tablet Take 1 tablet by mouth At  Night As Needed (sleep). 5/6/21   Marta Rodriguez PA-C   mirtazapine (REMERON) 7.5 MG tablet Take 1 tablet by mouth Every Night. 6/26/23   Monet Howe APRN   Multivitamin tablet tablet Take 1 tablet by mouth Daily. 8/16/23   Monet Howe APRN   prochlorperazine (COMPAZINE) 5 MG tablet Take 1 tablet by mouth Every 6 (Six) Hours As Needed for Nausea or Vomiting. 9/18/23   Delia Triplett DO   rivaroxaban (XARELTO) 15 MG tablet Take 1 tablet by mouth Daily With Dinner 6/13/23   Krishna Jean MD   terazosin (HYTRIN) 1 MG capsule Take 1 capsule by mouth Every Night. 8/16/23   Monet Howe APRN   traMADol (ULTRAM) 50 MG tablet Take 1 tablet by mouth 2 (Two) Times a Day As Needed for Moderate Pain. 11/9/23   Monet Howe APRN   vitamin D (ERGOCALCIFEROL) 1.25 MG (20437 UT) capsule capsule Take 1 capsule by mouth 1 (One) Time Per Week. 7/11/23   Monet Howe APRN   Insulin Glargine (BASAGLAR KWIKPEN) 100 UNIT/ML injection pen Inject 10 Units under the skin into the appropriate area as directed Every Night. 3/19/22 3/19/22  Kath Rosado MD       Allergies:  Patient has no known allergies.      Review of system  Review of Systems   Unable to perform ROS: Mental status change       Vitals:    02/06/24 1000   BP: 135/58   Pulse: 98   Resp:    Temp:    SpO2: 99%       Physical exam  Physical Exam  Eyes:      Pupils: Pupils are equal, round, and reactive to light.   Cardiovascular:      Rate and Rhythm: Normal rate and regular rhythm.   Pulmonary:      Effort: Pulmonary effort is normal.   Neurological:      Deep Tendon Reflexes: Babinski sign present on the left side.      Comments: Asleep, arouses to touch, right gaze preference, talks some to daughter, speech dysarthric, left facial droop, moves right some, left side with trace movement.           Lab Results   Component Value Date    WBC 12.97 (H) 02/06/2024    HGB 10.4 (L) 02/06/2024    HCT  31.2 (L) 02/06/2024    MCV 90.7 02/06/2024     02/06/2024     Lab Results   Component Value Date    GLUCOSE 549 (C) 02/06/2024    BUN 37 (H) 02/06/2024    CREATININE 1.64 (H) 02/06/2024    EGFRIFNONA 61 01/06/2015    EGFRIFAFRI 62 02/13/2022    BCR 22.6 02/06/2024    CO2 20.0 (L) 02/06/2024    CALCIUM 8.1 (L) 02/06/2024    PROTENTOTREF 7.6 01/06/2015    ALBUMIN 4.0 02/05/2024    LABIL2 1.6 01/06/2015    AST 13 02/05/2024    ALT 13 02/05/2024   ECG 12 Lead ED Triage Standing Order; Acute Stroke (Onset <24 hrs) (Order [id number])  Order-Level Documents:    Scan on 2/5/2024 2153 by New Onbase, [place]: ECG 12-LEAD         Author: -- Service: -- Author Type: --   Filed: Date of Service: Creation Time:   Status: (Other)   Test Reason : ED Triage Standing Order~  Blood Pressure :   */*   mmHG  Vent. Rate :  94 BPM     Atrial Rate :  94 BPM     P-R Int : 144 ms          QRS Dur :  76 ms      QT Int : 376 ms       P-R-T Axes :  67  52  78 degrees     QTc Int : 470 ms     Normal sinus rhythm  Possible Left atrial enlargement  Left ventricular hypertrophy  Nonspecific ST and T wave abnormality  Abnormal ECG  When compared with ECG of 07-SEP-2023 05:09,  No significant change was found  Confirmed by KASEY ROME MD ([id number]) on 2/5/2024 9:55:35 PM     Referred By: EDMD           Confirmed By: KASEY ROME MD        Signed        Radiological Studies:   CT Angiogram Head w AI Analysis of LVO    Result Date: 2/5/2024  CT ANGIOGRAM HEAD W AI ANALYSIS OF LVO, CT ANGIOGRAM NECK, CT CEREBRAL PERFUSION W WO CONTRAST Date of Exam: 2/5/2024 8:46 PM EST Indication: Neuro deficit, acute stroke suspected Neuro deficit, acute stroke suspected. Comparison: Concurrently performed noncontrast head CT. Technique: Axial CT images of the brain were obtained prior to and after the administration of 150 mL Isovue-370. CT Perfusion protocol was utilized. Automated post processing was performed by RAPID software and submitted to PACS for  interpretation.  CTA  of the head and neck were performed after the administration of iodinated contrast.  Reconstructed coronal and sagittal images were also obtained. A 3-D volume rendered image was created for interpretation. Automated exposure control and iterative reconstruction methods were used. Findings: CT Perfusion: No evidence of core infarct. Ischemic penumbra present within the right MCA territory, however unable to quantify given background of generalized hypoperfusion. Vascular findings: Visualized portions of the aortic arch are patent without evidence of significant ostial stenosis. Common carotid arteries are patent bilaterally. Mixed atheromatous disease of the bilateral carotid artery bifurcations without significant stenosis by NASCET criteria. The cervical portions of the internal carotid arteries are patent bilaterally. The intracranial portions of the bilateral internal carotid arteries are patent with atherosclerotic disease present. Abrupt vessel cut off of the proximal M2  segment of the right MCA. Left MCA is patent. 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 The vertebral arteries are patent bilaterally. The basilar artery is patent. The posterior cerebral arteries are patent bilaterally. No evidence of dissection or aneurysm. Nonvascular findings: Lung apices are grossly clear with mild paraseptal emphysematous changes present. 1.5 cm right thyroid nodule. Patient is edentulous. Aerodigestive structures appear within normal limits. No suspicious lymphadenopathy. Degenerative changes of the cervical spine with reversal of the normal cervical lordosis. No acute or suspicious osseous lesions.     Impression: Occlusion of the proximal M2 segment of the right MCA. No evidence of core infarct with ischemic penumbra present within the right MCA territory, however unable to quantify given background of generalized  hypoperfusion. Findings discussed with Gautam of the stroke team at 9:34 p.m. on 2/5/2024. Electronically Signed: Raymond Garcia MD  2/5/2024 9:56 PM EST  Workstation ID: [id number]    CT CEREBRAL PERFUSION WITH & WITHOUT CONTRAST    Result Date: 2/5/2024  CT ANGIOGRAM HEAD W AI ANALYSIS OF LVO, CT ANGIOGRAM NECK, CT CEREBRAL PERFUSION W WO CONTRAST Date of Exam: 2/5/2024 8:46 PM EST Indication: Neuro deficit, acute stroke suspected Neuro deficit, acute stroke suspected. Comparison: Concurrently performed noncontrast head CT. Technique: Axial CT images of the brain were obtained prior to and after the administration of 150 mL Isovue-370. CT Perfusion protocol was utilized. Automated post processing was performed by RAPID software and submitted to PACS for interpretation.  CTA  of the head and neck were performed after the administration of iodinated contrast.  Reconstructed coronal and sagittal images were also obtained. A 3-D volume rendered image was created for interpretation. Automated exposure control and iterative reconstruction methods were used. Findings: CT Perfusion: No evidence of core infarct. Ischemic penumbra present within the right MCA territory, however unable to quantify given background of generalized hypoperfusion. Vascular findings: Visualized portions of the aortic arch are patent without evidence of significant ostial stenosis. Common carotid arteries are patent bilaterally. Mixed atheromatous disease of the bilateral carotid artery bifurcations without significant stenosis by NASCET criteria. The cervical portions of the internal carotid arteries are patent bilaterally. The intracranial portions of the bilateral internal carotid arteries are patent with atherosclerotic disease present. Abrupt vessel cut off of the proximal M2  segment of the right MCA. Left MCA is patent. 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 The  vertebral arteries are patent bilaterally. The basilar artery is patent. The posterior cerebral arteries are patent bilaterally. No evidence of dissection or aneurysm. Nonvascular findings: Lung apices are grossly clear with mild paraseptal emphysematous changes present. 1.5 cm right thyroid nodule. Patient is edentulous. Aerodigestive structures appear within normal limits. No suspicious lymphadenopathy. Degenerative changes of the cervical spine with reversal of the normal cervical lordosis. No acute or suspicious osseous lesions.     Impression: Occlusion of the proximal M2 segment of the right MCA. No evidence of core infarct with ischemic penumbra present within the right MCA territory, however unable to quantify given background of generalized hypoperfusion. Findings discussed with Gautam of the stroke team at 9:34 p.m. on 2/5/2024. Electronically Signed: Raymond Garcia MD  2/5/2024 9:56 PM EST  Workstation ID: [id number]    CT Angiogram Neck    Result Date: 2/5/2024  CT ANGIOGRAM HEAD W AI ANALYSIS OF LVO, CT ANGIOGRAM NECK, CT CEREBRAL PERFUSION W WO CONTRAST Date of Exam: 2/5/2024 8:46 PM EST Indication: Neuro deficit, acute stroke suspected Neuro deficit, acute stroke suspected. Comparison: Concurrently performed noncontrast head CT. Technique: Axial CT images of the brain were obtained prior to and after the administration of 150 mL Isovue-370. CT Perfusion protocol was utilized. Automated post processing was performed by RAPID software and submitted to PACS for interpretation.  CTA  of the head and neck were performed after the administration of iodinated contrast.  Reconstructed coronal and sagittal images were also obtained. A 3-D volume rendered image was created for interpretation. Automated exposure control and iterative reconstruction methods were used. Findings: CT Perfusion: No evidence of core infarct. Ischemic penumbra present within the right MCA territory, however unable to quantify given  background of generalized hypoperfusion. Vascular findings: Visualized portions of the aortic arch are patent without evidence of significant ostial stenosis. Common carotid arteries are patent bilaterally. Mixed atheromatous disease of the bilateral carotid artery bifurcations without significant stenosis by NASCET criteria. The cervical portions of the internal carotid arteries are patent bilaterally. The intracranial portions of the bilateral internal carotid arteries are patent with atherosclerotic disease present. Abrupt vessel cut off of the proximal M2  segment of the right MCA. Left MCA is patent. 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 The vertebral arteries are patent bilaterally. The basilar artery is patent. The posterior cerebral arteries are patent bilaterally. No evidence of dissection or aneurysm. Nonvascular findings: Lung apices are grossly clear with mild paraseptal emphysematous changes present. 1.5 cm right thyroid nodule. Patient is edentulous. Aerodigestive structures appear within normal limits. No suspicious lymphadenopathy. Degenerative changes of the cervical spine with reversal of the normal cervical lordosis. No acute or suspicious osseous lesions.     Impression: Occlusion of the proximal M2 segment of the right MCA. No evidence of core infarct with ischemic penumbra present within the right MCA territory, however unable to quantify given background of generalized hypoperfusion. Findings discussed with Gautam of the stroke team at 9:34 p.m. on 2/5/2024. Electronically Signed: Raymond Garcia MD  2/5/2024 9:56 PM EST  Workstation ID: [id number]    XR Chest 1 View    Result Date: 2/5/2024  XR CHEST 1 VW Date of Exam: 2/5/2024 9:39 PM EST Indication: Acute Stroke Protocol (onset < 12 hrs) Comparison: None available. Findings: No focal consolidation. No pneumothorax or pleural effusion. Cardiac size is normal. The  visualized clavicles appear intact. No displaced rib fractures. The visualized upper abdomen is normal.     Impression: No acute cardiopulmonary disease. Electronically Signed: Souleymane Boone MD  2/5/2024 9:51 PM EST  Workstation ID: [id number]    CT Head Without Contrast Stroke Protocol    Result Date: 2/5/2024  CT HEAD WO CONTRAST STROKE PROTOCOL Date of Exam: 2/5/2024 8:42 PM EST Indication: Neuro deficit, acute, stroke suspected. Comparison: Brain MRI and head CT 2/4/2023. Technique: Axial CT images were obtained of the head without contrast administration.  Reconstructed coronal images were also obtained. Automated exposure control and iterative construction methods were used. Scan Time: 20:42 Results discussed with the clinical team via telephone by Raymond Garcia MD at 20:45. Findings: No evidence of acute intracranial hemorrhage or mass effect. No extra-axial collection. The gray white matter differentiation is preserved. Global parenchymal atrophy. Periventricular and subcortical white matter hypodensity, nonspecific, but likely sequela of chronic small vessel ischemic disease. Chronic infarcts in the left cerebellum and right basal ganglia. The mastoid air cells and paranasal sinuses are well aerated. Globes and extraocular muscles are unremarkable. No acute or suspicious osseous abnormality. Soft tissues within normal limits.     Impression: No evidence of acute intracranial abnormality. Similar chronic findings, including advanced white matter change that is likely sequela of chronic small vessel ischemic disease. Electronically Signed: Raymond Garcia MD  2/5/2024 8:52 PM EST  Workstation ID: [id number]       During this visit the following were done:  Labs Reviewed [x]    Labs Ordered []    Radiology Reports Reviewed [x]    Radiology Ordered []    EKG, echo, and/or stress test reviewed [x]    EEG results reviewed  []    EEG reviewed and interpreted per myself   []    Discussed case with neurointerventionalist  or neuroradiologist []    Referring Provider Records Reviewed []    ER Records Reviewed []    Hospital Records Reviewed []    History Obtained From Family []    Radiological images view and Interpreted per myself [x]    Case Discussed with referring provider []     Decision to obtain and request outside records  []        Assessment and Plan     Acute RMCA stroke, right M2 occlusion, suspect embolism 22 A.fib while on Xarelto, daughter thinks she is compliant. S/P MT last night.  Unable to have MRI 22 gastric stimulator for gastroparesis.   - F/U [facility] in am.   - EEG.   - NPO until cleared.   - ASA po vs pr.   - Hold AC until F/U CTH is cleared.   - ST, PT, OT.              Electronically signed by Genaro Solomon MD on 2/6/2024 at 11:52 EST

## 2024-02-06 NOTE — THERAPY EVALUATION
Patient Name: Thelma Michael  : 1960    MRN: [id number]                              Today's Date: 2024       Admit Date: 2024    Visit Dx:     ICD-10-CM ICD-9-CM   1. Cerebrovascular accident (CVA) due to occlusion of right middle cerebral artery  I63.511 434.91   2. Dysphagia, unspecified type  R13.10 787.20   3. Dysarthria  R47.1 784.51   4. Cognitive communication deficit  R41.841 799.52     Patient Active Problem List   Diagnosis    Diabetic peripheral neuropathy    Hyperlipidemia    Hypertension    Osteoporosis    Tobacco use    Heart valve disease    Iron deficiency anemia secondary to inadequate dietary iron intake    Acute kidney injury    DKA (diabetic ketoacidoses)    Uncontrolled type 1 diabetes mellitus with hyperglycemia    Gastroparesis    PFO (patent foramen ovale)    Atrial fibrillation and flutter    Intractable vomiting    Refractory nausea and vomiting    HHS vs mild DKA    Acute-on-CKD (chronic kidney disease) stage 3, GFR 30-59 ml/min    Hypertensive urgency    Urinary retention    Family history of transient ischemic attacks    Type 1 diabetes mellitus with hypoglycemia with coma    PAF (paroxysmal atrial fibrillation)    Anxiety associated with depression    Clavicle fracture    CVA (cerebral vascular accident)     Past Medical History:   Diagnosis Date    Acid reflux     Acute bronchitis     Cardiac murmur     Depression with anxiety 10/05/2020    Diabetes mellitus     Diabetes mellitus type I     Gastroesophageal reflux disease 2016    H/O echocardiogram 2012    i. LVEF 65%.ii. Mild LVH.iii. Borderline evidence of atrial septal aneurysm.  No PFO.     History of nuclear stress test 2014    Negative for ischemia and scars; LVEF 77%.      History of TIAs 07/15/2021    Hyperlipidemia     Hypertension     Impacted cerumen of both ears     Migraine     Migraines 10/31/2019    Self-catheterizes urinary bladder     Sinusitis     Stroke     Tobacco abuse      quit 4 days ago.      Urticaria     Vitamin D deficiency 05/13/2016     Past Surgical History:   Procedure Laterality Date    CAPSULE ENDOSCOPY  07/27/2021    Procedure: PILLCAM DEPLOYMENT;  Surgeon: Mikael Worthy MD;  Location:  [facility];  Service: Gastroenterology;;    COLONOSCOPY      COLONOSCOPY N/A 07/27/2021    Procedure: COLONOSCOPY;  Surgeon: Mikael Worthy MD;  Location:  [facility];  Service: Gastroenterology;  Laterality: N/A;    DENTAL PROCEDURE      ENDOSCOPY N/A 06/30/2021    Procedure: ESOPHAGOGASTRODUODENOSCOPY;  Surgeon: Brunner, Mark I, MD;  Location:  [facility];  Service: Gastroenterology;  Laterality: N/A;    ENDOSCOPY N/A 07/27/2021    Procedure: ESOPHAGOGASTRODUODENOSCOPY;  Surgeon: Mikael Worthy MD;  Location:  [facility];  Service: Gastroenterology;  Laterality: N/A;    ENDOSCOPY WITH JTUBE N/A 03/16/2022    Procedure: ESOPHAGOGASTRODUODENOSCOPY WITH JEJUNAL TUBE INSERTION;  Surgeon: Thom Glover MD;  Location:  [facility];  Service: Gastroenterology;  Laterality: N/A;    GASTRIC STIMULATOR IMPLANT SURGERY N/A     For gastropareisis    UPPER GASTROINTESTINAL ENDOSCOPY        General Information       Row Name 02/06/24 1551          OT Time and Intention    Document Type evaluation  -AN     Mode of Treatment occupational therapy  -AN       Row Name 02/06/24 1551          General Information    Patient Profile Reviewed yes  -AN     Prior Level of Function independent:;all household mobility;community mobility;gait;ADL's  -AN     Existing Precautions/Restrictions fall;other (see comments)  NG; L sided weakness; L inattention; lethargy; poor command following  -AN     Barriers to Rehab medically complex;cognitive status;visual deficit  -AN       Row Name 02/06/24 1551          Living Environment    People in Home child(bronson), adult  -AN       Row Name 02/06/24 1551          Home Main Entrance    Number of Stairs, Main Entrance five  -AN       Row  Name 02/06/24 1551          Stairs Within Home, Primary    Number of Stairs, Within Home, Primary twelve  -AN       Row Name 02/06/24 1551          Cognition    Orientation Status (Cognition) oriented to;person  -AN       Row Name 02/06/24 1551          Safety Issues, Functional Mobility    Safety Issues Affecting Function (Mobility) safety precautions follow-through/compliance;safety precaution awareness;awareness of need for assistance;problem-solving;judgment;insight into deficits/self-awareness;sequencing abilities  -AN     Impairments Affecting Function (Mobility) balance;cognition;coordination;endurance/activity tolerance;motor control;motor planning;postural/trunk control;range of motion (ROM);strength;grasp;visual/perceptual  -AN     Cognitive Impairments, Mobility Safety/Performance attention;safety precaution awareness;awareness, need for assistance;safety precaution follow-through;insight into deficits/self-awareness;sequencing abilities;judgment;problem-solving/reasoning  -AN               User Key  (r) = Recorded By, (t) = Taken By, (c) = Cosigned By      Initials Name Provider Type    AN Cynthia Garcia OT Occupational Therapist                     Mobility/ADL's       Row Name 02/06/24 1554          Bed Mobility    Bed Mobility supine-sit  -AN     Supine-Sit [place] (Bed Mobility) verbal cues;maximum assist (25% patient effort);2 person assist  -AN     Bed Mobility, Safety Issues decreased use of arms for pushing/pulling;decreased use of legs for bridging/pushing;impaired trunk control for bed mobility;cognitive deficits limit understanding  -AN     Assistive Device (Bed Mobility) head of bed elevated;draw sheet  -AN     Comment, (Bed Mobility) Assist provided at BLE and trunk with cues for sequencing of steps. Support provided at LUE due to no active movement observed.  -AN       Row Name 02/06/24 1554          Transfers    Transfers sit-stand transfer;stand-sit transfer  -AN     Comment,  (Transfers) STS performed EOB with b/l knee blocking and Max A x 2 for linen change and lissette care.  -AN       Row Name 02/06/24 1554          Sit-Stand Transfer    Sit-Stand [place] (Transfers) maximum assist (25% patient effort);2 person assist;verbal cues;nonverbal cues (demo/gesture)  -AN     Assistive Device (Sit-Stand Transfers) other (see comments)  UE support  -AN       Row Name 02/06/24 1554          Stand-Sit Transfer    Stand-Sit [place] (Transfers) verbal cues;nonverbal cues (demo/gesture);maximum assist (25% patient effort);2 person assist  -AN     Assistive Device (Stand-Sit Transfers) other (see comments)  UE support  -AN       Row Name 02/06/24 1554          Functional Mobility    Functional Mobility- Ind. Level unable to perform  -AN       Row Name 02/06/24 1554          Activities of Daily Living    BADL Assessment/Intervention upper body dressing;lower body dressing;toileting  -AN       Row Name 02/06/24 1554          Upper Body Dressing Assessment/Training    [place] Level (Upper Body Dressing) don;doff;dependent (less than 25% patient effort)  gown  -AN     Position (Upper Body Dressing) edge of bed sitting  -AN       Row Name 02/06/24 1554          Lower Body Dressing Assessment/Training    [place] Level (Lower Body Dressing) don;socks;dependent (less than 25% patient effort)  -AN     Position (Lower Body Dressing) supine  -AN       Row Name 02/06/24 1554          Toileting Assessment/Training    [place] Level (Toileting) perform perineal hygiene;dependent (less than 25% patient effort)  -AN     Position (Toileting) supported standing  -AN               User Key  (r) = Recorded By, (t) = Taken By, (c) = Cosigned By      Initials Name Provider Type    Cynthia Covington OT Occupational Therapist                   Obj/Interventions       Row Name 02/06/24 1556          Sensory Assessment (Somatosensory)    Sensory Assessment (Somatosensory) unable/difficult to assess   -AN       Row Name 02/06/24 1556          Vision Assessment/Intervention    Visual Impairment/Limitations unable/difficult to assess  -AN     Vision Assessment Comment Pt able to turn head to the left but not track to the L with eyes. Formal assessment limited by lethargy and poor command following.  -AN       Row Name 02/06/24 1556          Range of Motion Comprehensive    General Range of Motion bilateral upper extremity ROM WFL  -AN       Row Name 02/06/24 1556          Strength Comprehensive (MMT)    General Manual Muscle Testing (MMT) Assessment upper extremity strength deficits identified  -AN     Comment, General Manual Muscle Testing (MMT) Assessment RUE grossly 4+/5, LUE grossly 0/5  -AN       Row Name 02/06/24 1556          Motor Skills    Motor Skills coordination;motor control/coordination interventions  -AN     Coordination fine motor deficit;gross motor deficit;left;severe impairment  -AN     Motor Control/Coordination Interventions fine motor manipulation/dexterity activities;functional task specific training;gross motor coordination activities;occupation/activity based treatment  -AN     Therapeutic Exercise other (see comments)  Educated family on PROM to LUE in order to maintain joint integrity and positioning of LUE on pillows.  -AN       Row Name 02/06/24 1556          Balance    Balance Assessment sitting static balance;sitting dynamic balance;sit to stand dynamic balance;standing static balance  -AN     Static Sitting Balance maximum assist;verbal cues  -AN     Dynamic Sitting Balance maximum assist;verbal cues  -AN     Position, Sitting Balance supported;sitting edge of bed  -AN     Sit to Stand Dynamic Balance verbal cues;non-verbal cues (demo/gesture);maximum assist;2-person assist  -AN     Static Standing Balance verbal cues;maximum assist;2-person assist  -AN     Position/Device Used, Standing Balance supported  -AN     Balance Interventions standing;sit to stand;supported;static;highly  challenging;occupation based/functional task  -AN     Comment, Balance L lateral lean sitting EOB requiring Max A to correct with cues  -AN               User Key  (r) = Recorded By, (t) = Taken By, (c) = Cosigned By      Initials Name Provider Type    Cynthia Covington OT Occupational Therapist                   Goals/Plan       Row Name 02/06/24 1602          Bed Mobility Goal 1 (OT)    Activity/Assistive Device (Bed Mobility Goal 1, OT) sit to supine/supine to sit  -AN     [place] Level/Cues Needed (Bed Mobility Goal 1, OT) moderate assist (50-74% patient effort)  -AN     Time Frame (Bed Mobility Goal 1, OT) long term goal (LTG);10 days  -AN       Row Name 02/06/24 1602          Transfer Goal 1 (OT)    Activity/Assistive Device (Transfer Goal 1, OT) sit-to-stand/stand-to-sit;bed-to-chair/chair-to-bed;commode, bedside without drop arms  -AN     [place] Level/Cues Needed (Transfer Goal 1, OT) moderate assist (50-74% patient effort)  -AN     Time Frame (Transfer Goal 1, OT) long term goal (LTG);10 days  -AN       Row Name 02/06/24 1602          Dressing Goal 1 (OT)    Activity/Device (Dressing Goal 1, OT) upper body dressing  using iker dressing techniques  -AN     [place]/Cues Needed (Dressing Goal 1, OT) minimum assist (75% or more patient effort)  -AN     Time Frame (Dressing Goal 1, OT) long term goal (LTG);10 days  -AN       Row Name 02/06/24 1602          Therapy Assessment/Plan (OT)    Planned Therapy Interventions (OT) activity tolerance training;adaptive equipment training;BADL retraining;cognitive/visual perception retraining;functional balance retraining;occupation/activity based interventions;patient/caregiver education/training;transfer/mobility retraining;strengthening exercise  -AN               User Key  (r) = Recorded By, (t) = Taken By, (c) = Cosigned By      Initials Name Provider Type    Cynthia Covington OT Occupational Therapist                   Clinical Impression        Row Name 02/06/24 1559          Pain Assessment    Pretreatment Pain Rating 0/10 - no pain  -AN     Posttreatment Pain Rating 0/10 - no pain  -AN     Pre/Posttreatment Pain Comment asymptomatic  -AN     Pain Intervention(s) Repositioned;Ambulation/increased activity  -AN       Row Name 02/06/24 1559          Plan of Care Review    Plan of Care Reviewed With patient;daughter;sibling  -AN     Progress no change  -AN     Outcome Evaluation Pt presents below her functional baseline with deficits including L sided weakness, L inattention, impaired mobility, poor command following, and impaired ADLs warranting skilled OT services. Pt required Max A x 2 for bed mobility and STS. Educated family on PROM to L side and neuro re-education strategies. Rec IPR at dc.  -AN       Row Name 02/06/24 1559          Therapy Assessment/Plan (OT)    Patient/Family Therapy Goal Statement (OT) Return to PLOF  -AN     Rehab Potential (OT) good, to achieve stated therapy goals  -AN     Criteria for Skilled Therapeutic Interventions Met (OT) yes;skilled treatment is necessary  -AN     Therapy Frequency (OT) daily  -AN       Row Name 02/06/24 1559          Therapy Plan Review/Discharge Plan (OT)    Anticipated Discharge Disposition (OT) inpatient rehabilitation facility  -AN       Row Name 02/06/24 1559          Vital Signs    Pre Systolic BP Rehab 139  -AN     Pre Treatment Diastolic BP 59  -AN     Post Systolic BP Rehab 133  -AN     Post Treatment Diastolic BP 53  -AN     Pretreatment Heart Rate (beats/min) 96  -AN     Posttreatment Heart Rate (beats/min) 99  -AN     Pre SpO2 (%) 95  -AN     O2 Delivery Pre Treatment room air  -AN     O2 Delivery Intra Treatment room air  -AN     Post SpO2 (%) 94  -AN     O2 Delivery Post Treatment room air  -AN     Pre Patient Position Supine  -AN     Intra Patient Position Standing  -AN     Post Patient Position Sitting  -AN       Row Name 02/06/24 1559          Positioning and Restraints    Pre-Treatment  Position in bed  -AN     Post Treatment Position bed  -AN     In Bed notified nsg;sitting EOB;with PT  -AN     Restraints released:;reapplied:;soft limb  RUE  -AN               User Key  (r) = Recorded By, (t) = Taken By, (c) = Cosigned By      Initials Name Provider Type    Cynthia Covington, KAR Occupational Therapist                   Outcome Measures       Row Name 02/06/24 1603          How much help from another is currently needed...    Putting on and taking off regular lower body clothing? 1  -AN     Bathing (including washing, rinsing, and drying) 1  -AN     Toileting (which includes using toilet bed pan or urinal) 1  -AN     Putting on and taking off regular upper body clothing 1  -AN     Taking care of personal grooming (such as brushing teeth) 1  -AN     Eating meals 1  -AN     AM-PAC 6 Clicks Score (OT) 6  -AN       Row Name 02/06/24 1532          How much help from another person do you currently need...    Turning from your back to your side while in flat bed without using bedrails? 2  -KG     Moving from lying on back to sitting on the side of a flat bed without bedrails? 2  -KG     Moving to and from a bed to a chair (including a wheelchair)? 1  -KG     Standing up from a chair using your arms (e.g., wheelchair, bedside chair)? 2  -KG     Climbing 3-5 steps with a railing? 1  -KG     To walk in hospital room? 1  -KG     AM-PAC 6 Clicks Score (PT) 9  -KG     Highest Level of Mobility Goal 3 --> Sit at edge of bed  -KG       Row Name 02/06/24 1603 02/06/24 1532       Modified [place] Scale    Pre-Stroke Modified [place] Scale 6 - Unable to determine (UTD) from the medical record documentation  -AN 6 - Unable to determine (UTD) from the medical record documentation  -KG    Modified [place] Scale 4 - Moderately severe disability.  Unable to walk without assistance, and unable to attend to own bodily needs without assistance.  -AN 4 - Moderately severe disability.  Unable to walk without assistance, and  unable to attend to own bodily needs without assistance.  -KG      Row Name 02/06/24 1603 02/06/24 1532       Functional Assessment    Outcome Measure Options AM-PAC 6 Clicks Daily Activity (OT);Modified [place]  -AN AM-PAC 6 Clicks Basic Mobility (PT);Modified [place]  -KG              User Key  (r) = Recorded By, (t) = Taken By, (c) = Cosigned By      Initials Name Provider Type    KG Kellen Covarrubias, PT Physical Therapist    Cynthia Covington OT Occupational Therapist                    Occupational Therapy Education       Title: PT OT SLP Therapies (In Progress)       Topic: Occupational Therapy (Done)       Point: ADL training (Done)       Description:   Instruct learner(s) on proper safety adaptation and remediation techniques during self care or transfers.   Instruct in proper use of assistive devices.                  Learning Progress Summary             Patient Acceptance, E, VU by AN at 2/6/2024 1603   Family Acceptance, E, VU by AN at 2/6/2024 1603                         Point: Home exercise program (Done)       Description:   Instruct learner(s) on appropriate technique for monitoring, assisting and/or progressing therapeutic exercises/activities.                  Learning Progress Summary             Patient Acceptance, E, VU by AN at 2/6/2024 1603   Family Acceptance, E, VU by AN at 2/6/2024 1603                         Point: Precautions (Done)       Description:   Instruct learner(s) on prescribed precautions during self-care and functional transfers.                  Learning Progress Summary             Patient Acceptance, E, VU by AN at 2/6/2024 1603   Family Acceptance, E, VU by AN at 2/6/2024 1603                         Point: Body mechanics (Done)       Description:   Instruct learner(s) on proper positioning and spine alignment during self-care, functional mobility activities and/or exercises.                  Learning Progress Summary             Patient Acceptance, E, VU by AN at  2/6/2024 1603   Family Acceptance, E, VU by AN at 2/6/2024 1603                                         User Key       Initials Effective Dates Name Provider Type Discipline    AN 09/21/21 -  Cynthia Garcia OT Occupational Therapist OT                  OT Recommendation and Plan  Planned Therapy Interventions (OT): activity tolerance training, adaptive equipment training, BADL retraining, cognitive/visual perception retraining, functional balance retraining, occupation/activity based interventions, patient/caregiver education/training, transfer/mobility retraining, strengthening exercise  Therapy Frequency (OT): daily  Plan of Care Review  Plan of Care Reviewed With: patient, daughter, sibling  Progress: no change  Outcome Evaluation: Pt presents below her functional baseline with deficits including L sided weakness, L inattention, impaired mobility, poor command following, and impaired ADLs warranting skilled OT services. Pt required Max A x 2 for bed mobility and STS. Educated family on PROM to L side and neuro re-education strategies. Rec IPR at dc.     Time Calculation:   Evaluation Complexity (OT)  Review Occupational Profile/Medical/Therapy History Complexity: expanded/moderate complexity  Assessment, Occupational Performance/Identification of Deficit Complexity: 3-5 performance deficits  Clinical Decision Making Complexity (OT): detailed assessment/moderate complexity  Overall Complexity of Evaluation (OT): moderate complexity     Time Calculation- OT       Row Name 02/06/24 1604             Time Calculation- OT    OT Start Time 1350  -AN      OT Received On 02/06/24  -AN      OT Goal Re-Cert Due Date 02/16/24  -AN         Untimed Charges    OT Eval/Re-eval Minutes 48  -AN         Total Minutes    Untimed Charges Total Minutes 48  -AN       Total Minutes 48  -AN                User Key  (r) = Recorded By, (t) = Taken By, (c) = Cosigned By      Initials Name Provider Type    Cynthia Covington OT  Occupational Therapist                  Therapy Charges for Today       Code Description Service Date Service Provider Modifiers Qty    [id number] HC OT EVAL MOD COMPLEXITY 4 2/6/2024 Cynthia Garcia OT GO 1                 Cynthia Garcia OT  2/6/2024

## 2024-02-06 NOTE — PROGRESS NOTES
Intensive Care Follow-up     Hospital:  LOS: 1 day   Ms. Thelma Michael, 63 y.o. female is followed for:   CVA (cerebral vascular accident)            History of present illness:   Thelma Michael is a 63 y.o. female who has a PMHX of T1DM, GERD, TIAs, HLD, HTN, & AF for which she is on chronic anticoagulation (Xarelto).     Patient presented to ED via EMS w/ left sided weakness, facial droop, & slurred speech originating @ ~ 1940.  NIHSS 7.  Neuroimaging showed M2 occlusion of proximal R MCA.  Not a TNK candidate given anticoagulant use so was taken for catheter based intervention by Dr. Coronado who performed mechanical thrombectomy of a right MCA occlusion.      She is admitted to the ICU after the procedure.       Subjective   Interval History:  Patient is lethargic this morning, not following commands.  Family is at bedside, discussed her current situation with him.  I plan to have further prognostication after MRI and discussion with neurology.             The patient's past medical, surgical and social history were reviewed and updated in Epic as appropriate.       Objective     Infusions:  esmolol,  mcg/kg/min, Last Rate: 25 mcg/kg/min (02/06/24 0035)  insulin, 0-100 Units/hr  niCARdipine, 5-15 mg/hr, Last Rate: 12.5 mg/hr (02/06/24 0929)  sodium chloride, 100 mL/hr, Last Rate: 100 mL/hr (02/06/24 0138)      Medications:  aspirin, 81 mg, Oral, Daily   Or  aspirin, 300 mg, Rectal, Daily  atorvastatin, 80 mg, Oral, Nightly  insulin detemir, 20 Units, Subcutaneous, Nightly  senna-docusate sodium, 2 tablet, Oral, BID  sodium chloride, 10 mL, Intravenous, Q12H      I reviewed the patient's medications.    Vital Sign Min/Max for last 24 hours  Temp  Min: 97.8 °F (36.6 °C)  Max: 98.1 °F (36.7 °C)   BP  Min: 128/57  Max: 243/105   Pulse  Min: 80  Max: 98   Resp  Min: 16  Max: 20   SpO2  Min: 88 %  Max: 100 %   No data recorded       Input/Output for last 24 hour shift  02/05 0701 - 02/06 0700  In: 1542  [I.V.:1542]  Out: 2075 [Urine:2075]      GENERAL : Elderly frail female, lying in bed agitated.  RESPIRATORY/THORAX : normal respiratory effort and no intercostal retractions, CTAB  CARDIOVASCULAR : Normal S1/S2, RRR.  No lower ext edema.  GASTROINTESTINAL : Soft, NT/ND. BS x 4 normoactive. No hepatosplenomegaly.  MUSCULOSKELETAL : No cyanosis, clubbing, or ischemia  NEUROLOGICAL: Laying in bed moving extremities, would not follow commands or respond appropriately.    Results from last 7 days   Lab Units 02/06/24 0330 02/05/24 2124 02/05/24 2123   WBC 10*3/mm3 12.97* 7.63  --    HEMOGLOBIN g/dL 10.4* 11.6*  --    HEMOGLOBIN, POC g/dL  --   --  11.2*   PLATELETS 10*3/mm3 269 334  --      Results from last 7 days   Lab Units 02/06/24 0331 02/05/24 2124 02/05/24 2123   SODIUM mmol/L 142 138  --    POTASSIUM mmol/L 3.9 3.7  --    CO2 mmol/L 20.0* 26.0  --    BUN mg/dL 37* 35*  --    CREATININE mg/dL 1.64* 2.06* 2.50*   MAGNESIUM mg/dL 1.9  --   --    PHOSPHORUS mg/dL 3.4  --   --    GLUCOSE mg/dL 549* 350*  --      Estimated Creatinine Clearance: 36.8 mL/min (A) (by C-G formula based on SCr of 1.64 mg/dL (H)).          I reviewed the patient's new clinical results.  I reviewed the patient's new imaging results/reports including actual images and agree with reports.       Imaging Results (Last 24 Hours)       Procedure Component Value Units Date/Time    CT Angiogram Head w AI Analysis of LVO [[id number]] Collected: 02/05/24 2129     Updated: 02/05/24 2159    Narrative:      CT ANGIOGRAM HEAD W AI ANALYSIS OF LVO, CT ANGIOGRAM NECK, CT CEREBRAL PERFUSION W WO CONTRAST    Date of Exam: 2/5/2024 8:46 PM EST    Indication: Neuro deficit, acute stroke suspected Neuro deficit, acute stroke suspected.    Comparison: Concurrently performed noncontrast head CT.    Technique: Axial CT images of the brain were obtained prior to and after the administration of 150 mL Isovue-370. CT Perfusion protocol was utilized. Automated  post processing was performed by RAPID software and submitted to PACS for interpretation.  CTA   of the head and neck were performed after the administration of iodinated contrast.  Reconstructed coronal and sagittal images were also obtained. A 3-D volume rendered image was created for interpretation. Automated exposure control and iterative   reconstruction methods were used.    Findings:  CT Perfusion:  No evidence of core infarct. Ischemic penumbra present within the right MCA territory, however unable to quantify given background of generalized hypoperfusion.    Vascular findings:  Visualized portions of the aortic arch are patent without evidence of significant ostial stenosis. Common carotid arteries are patent bilaterally. Mixed atheromatous disease of the bilateral carotid artery bifurcations without significant stenosis by   NASCET criteria. The cervical portions of the internal carotid arteries are patent bilaterally. The intracranial portions of the bilateral internal carotid arteries are patent with atherosclerotic disease present. Abrupt vessel cut off of the proximal M2   segment of the right MCA. Left MCA is patent. 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 The vertebral arteries are patent bilaterally. The basilar artery is patent. The   posterior cerebral arteries are patent bilaterally. No evidence of dissection or aneurysm.    Nonvascular findings:  Lung apices are grossly clear with mild paraseptal emphysematous changes present. 1.5 cm right thyroid nodule. Patient is edentulous. Aerodigestive structures appear within normal limits. No suspicious lymphadenopathy. Degenerative changes of the   cervical spine with reversal of the normal cervical lordosis. No acute or suspicious osseous lesions.      Impression:      Impression:    Occlusion of the proximal M2 segment of the right MCA. No evidence of core infarct with  ischemic penumbra present within the right MCA territory, however unable to quantify given background of generalized hypoperfusion.    Findings discussed with Gautam of the stroke team at 9:34 p.m. on 2/5/2024.      Electronically Signed: Raymond Garcia MD    2/5/2024 9:56 PM EST    Workstation ID: [id number]    CT Angiogram Neck [[id number]] Collected: 02/05/24 2129     Updated: 02/05/24 2159    Narrative:      CT ANGIOGRAM HEAD W AI ANALYSIS OF LVO, CT ANGIOGRAM NECK, CT CEREBRAL PERFUSION W WO CONTRAST    Date of Exam: 2/5/2024 8:46 PM EST    Indication: Neuro deficit, acute stroke suspected Neuro deficit, acute stroke suspected.    Comparison: Concurrently performed noncontrast head CT.    Technique: Axial CT images of the brain were obtained prior to and after the administration of 150 mL Isovue-370. CT Perfusion protocol was utilized. Automated post processing was performed by RAPID software and submitted to PACS for interpretation.  CTA   of the head and neck were performed after the administration of iodinated contrast.  Reconstructed coronal and sagittal images were also obtained. A 3-D volume rendered image was created for interpretation. Automated exposure control and iterative   reconstruction methods were used.    Findings:  CT Perfusion:  No evidence of core infarct. Ischemic penumbra present within the right MCA territory, however unable to quantify given background of generalized hypoperfusion.    Vascular findings:  Visualized portions of the aortic arch are patent without evidence of significant ostial stenosis. Common carotid arteries are patent bilaterally. Mixed atheromatous disease of the bilateral carotid artery bifurcations without significant stenosis by   NASCET criteria. The cervical portions of the internal carotid arteries are patent bilaterally. The intracranial portions of the bilateral internal carotid arteries are patent with atherosclerotic disease present. Abrupt vessel cut off of the  proximal M2   segment of the right MCA. Left MCA is patent. 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 The vertebral arteries are patent bilaterally. The basilar artery is patent. The   posterior cerebral arteries are patent bilaterally. No evidence of dissection or aneurysm.    Nonvascular findings:  Lung apices are grossly clear with mild paraseptal emphysematous changes present. 1.5 cm right thyroid nodule. Patient is edentulous. Aerodigestive structures appear within normal limits. No suspicious lymphadenopathy. Degenerative changes of the   cervical spine with reversal of the normal cervical lordosis. No acute or suspicious osseous lesions.      Impression:      Impression:    Occlusion of the proximal M2 segment of the right MCA. No evidence of core infarct with ischemic penumbra present within the right MCA territory, however unable to quantify given background of generalized hypoperfusion.    Findings discussed with Gautam of the stroke team at 9:34 p.m. on 2/5/2024.      Electronically Signed: Raymond Garica MD    2/5/2024 9:56 PM EST    Workstation ID: [id number]    CT CEREBRAL PERFUSION WITH & WITHOUT CONTRAST [[id number]] Collected: 02/05/24 2129     Updated: 02/05/24 2159    Narrative:      CT ANGIOGRAM HEAD W AI ANALYSIS OF LVO, CT ANGIOGRAM NECK, CT CEREBRAL PERFUSION W WO CONTRAST    Date of Exam: 2/5/2024 8:46 PM EST    Indication: Neuro deficit, acute stroke suspected Neuro deficit, acute stroke suspected.    Comparison: Concurrently performed noncontrast head CT.    Technique: Axial CT images of the brain were obtained prior to and after the administration of 150 mL Isovue-370. CT Perfusion protocol was utilized. Automated post processing was performed by RAPID software and submitted to PACS for interpretation.  CTA   of the head and neck were performed after the administration of iodinated contrast.  Reconstructed coronal  and sagittal images were also obtained. A 3-D volume rendered image was created for interpretation. Automated exposure control and iterative   reconstruction methods were used.    Findings:  CT Perfusion:  No evidence of core infarct. Ischemic penumbra present within the right MCA territory, however unable to quantify given background of generalized hypoperfusion.    Vascular findings:  Visualized portions of the aortic arch are patent without evidence of significant ostial stenosis. Common carotid arteries are patent bilaterally. Mixed atheromatous disease of the bilateral carotid artery bifurcations without significant stenosis by   NASCET criteria. The cervical portions of the internal carotid arteries are patent bilaterally. The intracranial portions of the bilateral internal carotid arteries are patent with atherosclerotic disease present. Abrupt vessel cut off of the proximal M2   segment of the right MCA. Left MCA is patent. 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 The vertebral arteries are patent bilaterally. The basilar artery is patent. The   posterior cerebral arteries are patent bilaterally. No evidence of dissection or aneurysm.    Nonvascular findings:  Lung apices are grossly clear with mild paraseptal emphysematous changes present. 1.5 cm right thyroid nodule. Patient is edentulous. Aerodigestive structures appear within normal limits. No suspicious lymphadenopathy. Degenerative changes of the   cervical spine with reversal of the normal cervical lordosis. No acute or suspicious osseous lesions.      Impression:      Impression:    Occlusion of the proximal M2 segment of the right MCA. No evidence of core infarct with ischemic penumbra present within the right MCA territory, however unable to quantify given background of generalized hypoperfusion.    Findings discussed with Gautam of the stroke team at 9:34 p.m. on  2/5/2024.      Electronically Signed: Raymond Garcia MD    2/5/2024 9:56 PM EST    Workstation ID: [id number]    XR Chest 1 View [[id number]] Collected: 02/05/24 2150     Updated: 02/05/24 2154    Narrative:      XR CHEST 1 VW    Date of Exam: 2/5/2024 9:39 PM EST    Indication: Acute Stroke Protocol (onset < 12 hrs)    Comparison: None available.    Findings: No focal consolidation. No pneumothorax or pleural effusion. Cardiac size is normal. The visualized clavicles appear intact. No displaced rib fractures. The visualized upper abdomen is normal.      Impression:      Impression: No acute cardiopulmonary disease.    Electronically Signed: Souleymane Boone MD    2/5/2024 9:51 PM EST    Workstation ID: [id number]    CT Head Without Contrast Stroke Protocol [[id number]] Collected: 02/05/24 2047     Updated: 02/05/24 2055    Narrative:      CT HEAD WO CONTRAST STROKE PROTOCOL    Date of Exam: 2/5/2024 8:42 PM EST    Indication: Neuro deficit, acute, stroke suspected.    Comparison: Brain MRI and head CT 2/4/2023.    Technique: Axial CT images were obtained of the head without contrast administration.  Reconstructed coronal images were also obtained. Automated exposure control and iterative construction methods were used.    Scan Time: 20:42  Results discussed with the clinical team via telephone by Raymond Garcia MD at 20:45.    Findings:    No evidence of acute intracranial hemorrhage or mass effect. No extra-axial collection. The gray white matter differentiation is preserved. Global parenchymal atrophy. Periventricular and subcortical white matter hypodensity, nonspecific, but likely   sequela of chronic small vessel ischemic disease. Chronic infarcts in the left cerebellum and right basal ganglia.    The mastoid air cells and paranasal sinuses are well aerated. Globes and extraocular muscles are unremarkable. No acute or suspicious osseous abnormality. Soft tissues within normal limits.      Impression:       Impression:    No evidence of acute intracranial abnormality.    Similar chronic findings, including advanced white matter change that is likely sequela of chronic small vessel ischemic disease.      Electronically Signed: Raymond Garcia MD    2/5/2024 8:52 PM EST    Workstation ID: [id number]            Assessment & Plan   Impression        CVA (cerebral vascular accident)    Diabetic peripheral neuropathy    Hyperlipidemia    Hypertension    Heart valve disease    Uncontrolled type 1 diabetes mellitus with hyperglycemia    Gastroparesis    PFO (patent foramen ovale)    Atrial fibrillation and flutter    Acute-on-CKD (chronic kidney disease) stage 3, GFR 30-59 ml/min    Hypertensive urgency       Plan        Ms. Michael is a 64yo F with a history of T1DM (insulin pump), GERD, TIA, HLD, HTN and Afib on Xarelto who presented to [facility] ED via EMS on 2/5/24 with left sided weakness, facial droop, and slurred speech. Imaging showed a M2 occlusion of the proximal right MCA.  She was not a candidate for TNK secondary to anticoagulation. She was taken to the cath lab by Dr. Coronado where she underwent mechanical thrombectomy of the right MCA.     -Stroke management per neurology  -MRI and echo pending  -Nicardipine and esmolol for blood pressure control, once p.o. access is obtained we will start home antihypertensive medications  -Blood sugars in the 500s will start a insulin drip, goal blood glucose less than 180  -Continue normal saline at 100 cc/h  -Aspirin/statin  -Plan to reinitiate anticoagulation per neurology's recommendations.  -PT/OT/ST  -SCDs for DVT prophylaxis  -A.m. labs   -Given the severity of her acute CVA she is at high risk for decompensation, and potential need for life support.    I conducted multidisciplinary rounds in the plan of care was discussed with the multidisciplinary team at that time. In attendance at multidisciplinary rounds was clinical pharmacist, dietitian, nursing staff, and case  management.    I discussed the patient's findings and my recommendations with patient, family, and nursing staff     . Critical Care time spent in direct patient care: 35 minutes (excluding procedure time, if applicable) including high complexity decision making to assess, manipulate, and support vital organ system failure in this individual who has impairment of one or more vital organ systems such that there is a high probability of imminent or life threatening deterioration in the patient's condition.      Trey Norton, DO  Pulmonary, Critical care and Sleep Medicine

## 2024-02-06 NOTE — ED NOTES
Thelma Michael    Nursing Report ED to Floor:  Mental status: GCS 15. Currently sedated with versed.   Ambulatory status: nonambulatory, left sided weakness  Oxygen Therapy:  room air   Cardiac Rhythm: normal sinus   Admitted from: home   Safety Concerns:  fall risk, severe anxiety  Social Issues: none  ED Room #:  3    ED Nurse Phone Extension - [phone] or may call [phone].      HPI:   Chief Complaint   Patient presents with    Stroke       Past Medical History:  Past Medical History:   Diagnosis Date    Acid reflux     Acute bronchitis     Cardiac murmur     Depression with anxiety 10/05/2020    Diabetes mellitus     Diabetes mellitus type I     Gastroesophageal reflux disease 05/13/2016    H/O echocardiogram 08/07/2012    i. LVEF 65%.ii. Mild LVH.iii. Borderline evidence of atrial septal aneurysm.  No PFO.     History of nuclear stress test 08/22/2014    Negative for ischemia and scars; LVEF 77%.      History of TIAs 07/15/2021    Hyperlipidemia     Hypertension     Impacted cerumen of both ears     Migraine     Migraines 10/31/2019    Self-catheterizes urinary bladder     Sinusitis     Stroke     Tobacco abuse     quit 4 days ago.      Urticaria     Vitamin D deficiency 05/13/2016        Past Surgical History:  Past Surgical History:   Procedure Laterality Date    CAPSULE ENDOSCOPY  07/27/2021    Procedure: PILLCAM DEPLOYMENT;  Surgeon: Mikael Worthy MD;  Location:  [facility];  Service: Gastroenterology;;    COLONOSCOPY      COLONOSCOPY N/A 07/27/2021    Procedure: COLONOSCOPY;  Surgeon: Mikael Worthy MD;  Location:  [facility];  Service: Gastroenterology;  Laterality: N/A;    DENTAL PROCEDURE      ENDOSCOPY N/A 06/30/2021    Procedure: ESOPHAGOGASTRODUODENOSCOPY;  Surgeon: Brunner, Mark I, MD;  Location:  [facility];  Service: Gastroenterology;  Laterality: N/A;    ENDOSCOPY N/A 07/27/2021    Procedure: ESOPHAGOGASTRODUODENOSCOPY;  Surgeon: Mikael Worthy MD;  Location:   [facility];  Service: Gastroenterology;  Laterality: N/A;    ENDOSCOPY WITH JTUBE N/A 03/16/2022    Procedure: ESOPHAGOGASTRODUODENOSCOPY WITH JEJUNAL TUBE INSERTION;  Surgeon: Thom Glover MD;  Location: [facility];  Service: Gastroenterology;  Laterality: N/A;    GASTRIC STIMULATOR IMPLANT SURGERY N/A     For gastropareisis    UPPER GASTROINTESTINAL ENDOSCOPY          Admitting Doctor:   Silvia NORWOOD Case, DO    Consulting Provider(s):  Consults       Date and Time Order Name Status Description    2/5/2024  8:42 PM Inpatient Neurology Consult Stroke               Admitting Diagnosis:   The encounter diagnosis was Cerebrovascular accident (CVA) due to occlusion of right middle cerebral artery.    Most Recent Vitals:   Vitals:    02/05/24 2141 02/05/24 2145 02/05/24 2146 02/05/24 2151   BP:  (!) 204/97 (!) 204/97 (!) 224/128   Pulse:  89 87 87   Resp:       Temp: 97.8 °F (36.6 °C)      TempSrc: Oral      SpO2:  97% 98% 100%   Weight:       Height:           Active LDAs/IV Access:   Lines, Drains & Airways       Active LDAs       Name Placement date Placement time Site Days    Peripheral IV 02/05/24 Anterior;Right;Upper Arm 02/05/24  --  Arm  less than 1    Insulin Pump 07/03/22  0800  -- 582                    Labs (abnormal labs have a star):   Labs Reviewed   SINGLE HSTROPONIN T - Abnormal; Notable for the following components:       Result Value    HS Troponin T 62 (*)     All other components within normal limits    Narrative:     High Sensitive Troponin T Reference Range:  <14.0 ng/L- Negative Female for AMI  <22.0 ng/L- Negative Male for AMI  >=14 - Abnormal Female indicating possible myocardial injury.  >=22 - Abnormal Male indicating possible myocardial injury.   Clinicians would have to utilize clinical acumen, EKG, Troponin, and serial changes to determine if it is an Acute Myocardial Infarction or myocardial injury due to an underlying chronic condition.        CBC WITH AUTO DIFFERENTIAL -  Abnormal; Notable for the following components:    Hemoglobin 11.6 (*)     All other components within normal limits   POCT CHEM 8 - Abnormal; Notable for the following components:    Glucose 336 (*)     BUN 35 (*)     Creatinine 2.50 (*)     Hemoglobin 11.2 (*)     Hematocrit 33 (*)     Ionized Calcium 1.17 (*)     eGFR 20.9 (*)     All other components within normal limits   POCT PROTIME - INR - Normal   RAINBOW DRAW    Narrative:     The following orders were created for panel order [place] Draw.  Procedure                               Abnormality         Status                     ---------                               -----------         ------                     Green Top (Gel)[[id number]]                                  In process                 Lavender Top[[id number]]                                     In process                 Gold Top - SST[[id number]]                                   In process                 Pardo Top[[id number]]                                         In process                 Light Blue Top[[id number]]                                                                Please view results for these tests on the individual orders.   APTT   HEPARIN ANTI XA   COMPREHENSIVE METABOLIC PANEL   HIGH SENSITIVITIY TROPONIN T 2HR   CBC AND DIFFERENTIAL    Narrative:     The following orders were created for panel order CBC & Differential.  Procedure                               Abnormality         Status                     ---------                               -----------         ------                     CBC Auto Differential[[id number]]        Abnormal            Final result                 Please view results for these tests on the individual orders.   GREEN TOP   LAVENDER TOP   GOLD TOP - SST   GRAY TOP   LIGHT BLUE TOP       Meds Given in ED:   Medications   sodium chloride 0.9 % flush 10 mL (has no administration in time range)   midazolam (VERSED) injection 5 mg (5 mg Intramuscular  Given 2/5/24 2111)   iopamidol (ISOVUE-370) 76 % injection 150 mL (115 mL Intravenous Given 2/5/24 2126)

## 2024-02-06 NOTE — PROGRESS NOTES
Ms. Michael initially presented with a classic right MCA syndrome (dysarthria, neglect, left hemiparesis).  Upon arrival to [facility], her symptoms had improved with NIHSS 7, but this was with systolic BPs in the 220/230 range. CTA shows acute occlusion of dominant M2 branch right MCA.  Given the likelihood of neurologic deterioration with normalization of blood pressure, mechanical thrombectomy was deemed indicated.  Ms. Michael is being brought to the Cath Lab for emergent neuro intervention.

## 2024-02-06 NOTE — CASE MANAGEMENT/SOCIAL WORK
Discharge Planning Assessment  [facility]     Patient Name: Thelma Michael  MRN: [id number]  Today's Date: 2/6/2024    Admit Date: 2/5/2024    Plan: IDP   Discharge Needs Assessment       Row Name 02/06/24 0932       Living Environment    People in Home child(bronson), adult    Name(s) of People in Home christina Alves    Current Living Arrangements apartment    Potentially Unsafe Housing Conditions unable to assess    Primary Care Provided by self    Provides Primary Care For no one    Family Caregiver if Needed child(bronson), adult    Family Caregiver Names christina Alves    Quality of Family Relationships supportive;helpful;involved       Resource/Environmental Concerns    Resource/Environmental Concerns none    Transportation Concerns none       Transportation Needs    In the past 12 months, has lack of transportation kept you from medical appointments or from getting medications? no    In the past 12 months, has lack of transportation kept you from meetings, work, or from getting things needed for daily living? No       Food Insecurity    Within the past 12 months, you worried that your food would run out before you got the money to buy more. Never true    Within the past 12 months, the food you bought just didn't last and you didn't have money to get more. Never true       Transition Planning    Patient/Family Anticipates Transition to home    Patient/Family Anticipated Services at Transition     Transportation Anticipated family or friend will provide       Discharge Needs Assessment    Equipment Currently Used at Home cane, straight;walker, rolling;bp cuff;glucometer    Concerns to be Addressed discharge planning                   Discharge Plan       Row Name 02/06/24 0934       Plan    Plan IDP    Plan Comments Chart reviewed from recent admission for initial discharge planning assessment.  Patient lives with son, Keshav Soto, in [facility] [place]; PCP is Monet Álvarez;  insurance is [employer].  Patient is reported to be independent with ADLs and has a cane for ambulation.  Patient is currently attending OP PT at [facility] (patient was discharged to [facility] from [facility] in September 2023).  Goal is home; therapy evaluations and recommendations are pending.  CM will continue to follow and assist with discharge plan.    Final Discharge Disposition Code 30 - still a patient                  Continued Care and Services - Admitted Since 2/5/2024    Coordination has not been started for this encounter.          Demographic Summary       Row Name 02/06/24 0930       General Information    Arrived From emergency department    Referral Source admission list    Reason for Consult discharge planning    Preferred Language English       Contact Information    Permission Granted to Share Info With                    Functional Status       Row Name 02/06/24 0930       Functional Status    Usual Activity Tolerance good       Functional Status, IADL    Medications independent    Meal Preparation assistive equipment    Housekeeping assistive equipment    Laundry assistive equipment    Shopping assistive equipment                   Psychosocial    No documentation.                  Abuse/Neglect    No documentation.                  Legal    No documentation.                  Substance Abuse    No documentation.                  Patient Forms    No documentation.                     Ashlee Zavala RN

## 2024-02-06 NOTE — SIGNIFICANT NOTE
Stroke Neurology    Called by bedside RN secondary to increase in NIH from 12-17. 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 Unfortunately she is also no longer protecting her airway. Imaging reviewed with Dr. Solomon as well as NS service an unfortunately no other interventions can be offered at this time. We will repeat CTH in 6 hours. Load with 1000mg of Keppra now for prophylaxis. Discussed intubation and goals of care with Ms. Jeter family who is present at the bedside as well as with her son Keshav over the phone. The decision was made at this time to continue full supportive care including intubation at this time. Lengthy discussion with family that despite this intervention that she still has a grave prognosis and expected poor outcome unfortunately. Stroke Neurology will continue to follow. Please call with any further questions or concerns.     HOWIE Santos

## 2024-02-06 NOTE — H&P
INTENSIVIST   INITIAL HOSPITAL VISIT NOTE     Hospital:  LOS: 1 day     Ms. Thelma Michael, 63 y.o. female is seen for a Chief Complaint of:  Chief Complaint   Patient presents with    Stroke       CVA (cerebral vascular accident)    Diabetic peripheral neuropathy    Hyperlipidemia    Hypertension    Heart valve disease    Uncontrolled type 1 diabetes mellitus with hyperglycemia    Gastroparesis    PFO (patent foramen ovale)    Atrial fibrillation and flutter    Acute-on-CKD (chronic kidney disease) stage 3, GFR 30-59 ml/min    Hypertensive urgency      Subjective   S     Thelma Michael is a 63 y.o. female who has a PMHX of T1DM, GERD, TIAs, HLD, HTN, & AF for which she is on chronic anticoagulation (Xarelto).    Patient presented to ED via EMS w/ left sided weakness, facial droop, & slurred speech originating @ ~ 1940.  NIHSS 7.  Neuroimaging showed M2 occlusion of proximal R MCA.  Not a TNK candidate given anticoagulant use so was taken for catheter based intervention by Dr. Coronado who performed mechanical thrombectomy of a right MCA occlusion.     She is admitted to the ICU after the procedure.        PMH: She  has a past medical history of Acid reflux, Acute bronchitis, Cardiac murmur, Depression with anxiety (10/05/2020), Diabetes mellitus, Diabetes mellitus type I, Gastroesophageal reflux disease (05/13/2016), H/O echocardiogram (08/07/2012), History of nuclear stress test (08/22/2014), History of TIAs (07/15/2021), Hyperlipidemia, Hypertension, Impacted cerumen of both ears, Migraine, Migraines (10/31/2019), Self-catheterizes urinary bladder, Sinusitis, Stroke, Tobacco abuse, Urticaria, and Vitamin D deficiency (05/13/2016).   PSxH: She  has a past surgical history that includes Dental surgery; Colonoscopy; Esophagogastroduodenoscopy (N/A, 06/30/2021); Colonoscopy (N/A, 07/27/2021); Esophagogastroduodenoscopy (N/A, 07/27/2021); Capsule Endoscopy (07/27/2021); endoscopy with jtube (N/A, 03/16/2022);  Upper gastrointestinal endoscopy; and Gastric stimulator implant surgery (N/A).      Medications:  No current facility-administered medications on file prior to encounter.     Current Outpatient Medications on File Prior to Encounter   Medication Sig    acetaminophen (TYLENOL) 325 MG tablet Take 2 tablets by mouth Every 4 (Four) Hours As Needed for Mild Pain .    albuterol sulfate  (90 Base) MCG/ACT inhaler Inhale 2 puffs Every 4 (Four) Hours As Needed for Wheezing.    amLODIPine (NORVASC) 10 MG tablet Take 1 tablet by mouth Daily.    atorvastatin (Lipitor) 80 MG tablet Take 1 tablet by mouth Daily.    Blood Glucose Monitoring Suppl (FreeStyle Lite) w/Device kit 1 Device 3 (Three) Times a Day.    calcium carbonate (TUMS) 500 MG chewable tablet Chew 1,000 mg 3 (Three) Times a Day As Needed for Indigestion or Heartburn.    carvedilol (COREG) 12.5 MG tablet Take 1 tablet by mouth 2 (Two) Times a Day With Meals.    Continuous Blood Gluc Transmit (Dexcom G6 Transmitter) misc 1 each by Other route Every 3 (Three) Months.    famotidine (PEPCID) 20 MG tablet Take 1 tablet by mouth Every Morning.    ferrous sulfate 325 (65 Fe) MG tablet Take with Vitamin C or Juice (Patient taking differently: Daily With Breakfast. Take with Vitamin C or Juice)    gabapentin (NEURONTIN) 100 MG capsule Take 1 capsule by mouth 3 (Three) Times a Day As Needed (back pain and neuropathy).    glucose blood test strip Check blood sugar 5 times daily as needed.    Insulin Infusion Pump (T:slim X2 Insulin Pump) device Use.    Insulin Lispro (humaLOG) 100 UNIT/ML injection Inject 2-7 Units under the skin into the appropriate area as directed 4 (Four) Times a Day Before Meals & at Bedtime.    lactobacillus acidophilus (RISAQUAD) capsule capsule Take 1 capsule by mouth Daily.    lidocaine (LIDODERM) 5 % Place 1 patch on the skin as directed by provider Daily. No more than 1 patch per 24 hours.    losartan (COZAAR) 100 MG tablet Take 1 tablet by  mouth Daily.    melatonin 5 MG tablet tablet Take 1 tablet by mouth At Night As Needed (sleep).    mirtazapine (REMERON) 7.5 MG tablet Take 1 tablet by mouth Every Night.    Multivitamin tablet tablet Take 1 tablet by mouth Daily.    prochlorperazine (COMPAZINE) 5 MG tablet Take 1 tablet by mouth Every 6 (Six) Hours As Needed for Nausea or Vomiting.    rivaroxaban (XARELTO) 15 MG tablet Take 1 tablet by mouth Daily With Dinner    terazosin (HYTRIN) 1 MG capsule Take 1 capsule by mouth Every Night.    traMADol (ULTRAM) 50 MG tablet Take 1 tablet by mouth 2 (Two) Times a Day As Needed for Moderate Pain.    vitamin D (ERGOCALCIFEROL) 1.25 MG (25757 UT) capsule capsule Take 1 capsule by mouth 1 (One) Time Per Week.    [DISCONTINUED] Insulin Glargine (BASAGLAR KWIKPEN) 100 UNIT/ML injection pen Inject 10 Units under the skin into the appropriate area as directed Every Night.       Allergies: She has No Known Allergies.   FH: Her family history includes ADD / ADHD in an other family member; Anxiety disorder in an other family member; Arthritis in an other family member; Depression in an other family member; Diabetes in her brother and another family member; Heart disease in an other family member; Hyperlipidemia in her mother and another family member; Hypertension in her brother, mother, and another family member; Lung cancer in an other family member; Osteoporosis in an other family member.   SH: She  reports that she quit smoking about 4 years ago. Her smoking use included cigarettes. She has a 7.50 pack-year smoking history. She has never used smokeless tobacco. She reports current alcohol use of about 1.0 standard drink of alcohol per week. She reports that she does not currently use drugs after having used the following drugs: Marijuana. Frequency: 2.00 times per week.     The patient's relevant past medical, surgical and social history were reviewed and updated in Epic as appropriate.     ROS (14):   Review of  Systems   Eyes:  Positive for visual disturbance.   Gastrointestinal:  Positive for nausea.   Neurological:  Positive for dizziness, facial asymmetry, speech difficulty, weakness and headaches.   Psychiatric/Behavioral:  Positive for confusion.        Objective   O     Vitals    Temp  Min: 97.8 °F (36.6 °C)  Max: 97.8 °F (36.6 °C)  BP  Min: 150/135  Max: 243/105  Pulse  Min: 86  Max: 96  Resp  Min: 18  Max: 20  SpO2  Min: 88 %  Max: 100 % No data recorded     I/O 24 hrs (7:00AM - 6:59 AM)  Intake/Output         02/05/24 0700 - 02/06/24 0659    Intake (ml) --    Output (ml) 1100    Net (ml) -1100    Last Weight 65.8 kg (145 lb)            Medications (drips):  esmolol  niCARdipine  sodium chloride        Physical Examination    Telemetry: Normal sinus rhythm.    Constitutional:  No acute distress.  Resting in the bed comfortably.    HEENT: Normocephalic and atraumatic.   Neck:  Normal range of motion. Neck supple.   No JVD present.   No thyromegaly.    Cardiovascular: Regular rate and rhythm.  No murmurs, rub or gallop.  No peripheral edema.   Respiratory: Normal respiratory effort.  Clear to ascultation.   Abdominal:  Soft. No masses.   Non-tender. No distension.   No hepatosplenomegaly.   MSK: Normal muscle strength and tone.   Extremities: No digital cyanosis or clubbing.   Skin: Skin is warm and dry.  No rashes, lesions or ulcers noted.    Neurological:   Arouses to stimulation.   Severe aphasia. Left sided weakness.    Psychiatric:  Unable to assess.      Interval: baseline  1a. Level of Consciousness: 1-->Not alert, but arousable by minor stimulation to obey, answer, or respond  1b. LOC Questions: 0-->Answers both questions correctly  1c. LOC Commands: 0-->Performs both tasks correctly  2. Best Gaze: 0-->Normal  3. Visual: 0-->No visual loss  4. Facial Palsy: 0-->Normal symmetrical movements  5a. Motor Arm, Left: 0-->No drift, limb holds 90 (or 45) degrees for full 10 secs  5b. Motor Arm, Right: 0-->No drift,  limb holds 90 (or 45) degrees for full 10 secs  6a. Motor Leg, Left: 0-->No drift, leg holds 30 degree position for full 5 secs  6b. Motor Leg, Right: 0-->No drift, leg holds 30 degree position for full 5 secs  7. Limb Ataxia: 0-->Absent  8. Sensory: 0-->Normal, no sensory loss  9. Best Language: 1-->Mild-to-moderate aphasia, some obvious loss of fluency or facility of comprehension, without significant limitation on ideas expressed or form of expression. Reduction of speech and/or comprehension, however, makes conversation. . . (see row details)  10. Dysarthria: 0-->Normal  11. Extinction and Inattention (formerly Neglect): 0-->No abnormality    Total (NIH Stroke Scale): 2         Results from last 7 days   Lab Units 02/05/24 2124 02/05/24 2123   WBC 10*3/mm3 7.63  --    HEMOGLOBIN g/dL 11.6*  --    HEMOGLOBIN, POC g/dL  --  11.2*   MCV fL 90.3  --    PLATELETS 10*3/mm3 334  --      Results from last 7 days   Lab Units 02/05/24 2124 02/05/24 2123   SODIUM mmol/L 138  --    POTASSIUM mmol/L 3.7  --    CO2 mmol/L 26.0  --    CREATININE mg/dL 2.06* 2.50*     Estimated Creatinine Clearance: 28.3 mL/min (A) (by C-G formula based on SCr of 2.06 mg/dL (H)).  Results from last 7 days   Lab Units 02/05/24 2124   ALK PHOS U/L 119*   BILIRUBIN mg/dL 0.3   ALT (SGPT) U/L 13   AST (SGOT) U/L 13               Images:   2/5/24      Imaging Results (Last 24 Hours)       Procedure Component Value Units Date/Time    CT Angiogram Head w AI Analysis of LVO [[id number]] Collected: 02/05/24 2129     Updated: 02/05/24 2159    Narrative:      CT ANGIOGRAM HEAD W AI ANALYSIS OF LVO, CT ANGIOGRAM NECK, CT CEREBRAL PERFUSION W WO CONTRAST    Date of Exam: 2/5/2024 8:46 PM EST    Indication: Neuro deficit, acute stroke suspected Neuro deficit, acute stroke suspected.    Comparison: Concurrently performed noncontrast head CT.    Technique: Axial CT images of the brain were obtained prior to and after the administration of 150 mL  Isovue-370. CT Perfusion protocol was utilized. Automated post processing was performed by RAPID software and submitted to PACS for interpretation.  CTA   of the head and neck were performed after the administration of iodinated contrast.  Reconstructed coronal and sagittal images were also obtained. A 3-D volume rendered image was created for interpretation. Automated exposure control and iterative   reconstruction methods were used.    Findings:  CT Perfusion:  No evidence of core infarct. Ischemic penumbra present within the right MCA territory, however unable to quantify given background of generalized hypoperfusion.    Vascular findings:  Visualized portions of the aortic arch are patent without evidence of significant ostial stenosis. Common carotid arteries are patent bilaterally. Mixed atheromatous disease of the bilateral carotid artery bifurcations without significant stenosis by   NASCET criteria. The cervical portions of the internal carotid arteries are patent bilaterally. The intracranial portions of the bilateral internal carotid arteries are patent with atherosclerotic disease present. Abrupt vessel cut off of the proximal M2   segment of the right MCA. Left MCA is patent. 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 The vertebral arteries are patent bilaterally. The basilar artery is patent. The   posterior cerebral arteries are patent bilaterally. No evidence of dissection or aneurysm.    Nonvascular findings:  Lung apices are grossly clear with mild paraseptal emphysematous changes present. 1.5 cm right thyroid nodule. Patient is edentulous. Aerodigestive structures appear within normal limits. No suspicious lymphadenopathy. Degenerative changes of the   cervical spine with reversal of the normal cervical lordosis. No acute or suspicious osseous lesions.      Impression:      Impression:    Occlusion of the proximal M2  segment of the right MCA. No evidence of core infarct with ischemic penumbra present within the right MCA territory, however unable to quantify given background of generalized hypoperfusion.    Findings discussed with Gautam of the stroke team at 9:34 p.m. on 2/5/2024.      Electronically Signed: Raymond Garcia MD    2/5/2024 9:56 PM EST    Workstation ID: [id number]    CT Angiogram Neck [[id number]] Collected: 02/05/24 2129     Updated: 02/05/24 2159    Narrative:      CT ANGIOGRAM HEAD W AI ANALYSIS OF LVO, CT ANGIOGRAM NECK, CT CEREBRAL PERFUSION W WO CONTRAST    Date of Exam: 2/5/2024 8:46 PM EST    Indication: Neuro deficit, acute stroke suspected Neuro deficit, acute stroke suspected.    Comparison: Concurrently performed noncontrast head CT.    Technique: Axial CT images of the brain were obtained prior to and after the administration of 150 mL Isovue-370. CT Perfusion protocol was utilized. Automated post processing was performed by RAPID software and submitted to PACS for interpretation.  CTA   of the head and neck were performed after the administration of iodinated contrast.  Reconstructed coronal and sagittal images were also obtained. A 3-D volume rendered image was created for interpretation. Automated exposure control and iterative   reconstruction methods were used.    Findings:  CT Perfusion:  No evidence of core infarct. Ischemic penumbra present within the right MCA territory, however unable to quantify given background of generalized hypoperfusion.    Vascular findings:  Visualized portions of the aortic arch are patent without evidence of significant ostial stenosis. Common carotid arteries are patent bilaterally. Mixed atheromatous disease of the bilateral carotid artery bifurcations without significant stenosis by   NASCET criteria. The cervical portions of the internal carotid arteries are patent bilaterally. The intracranial portions of the bilateral internal carotid arteries are patent with  atherosclerotic disease present. Abrupt vessel cut off of the proximal M2   segment of the right MCA. Left MCA is patent. 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 The vertebral arteries are patent bilaterally. The basilar artery is patent. The   posterior cerebral arteries are patent bilaterally. No evidence of dissection or aneurysm.    Nonvascular findings:  Lung apices are grossly clear with mild paraseptal emphysematous changes present. 1.5 cm right thyroid nodule. Patient is edentulous. Aerodigestive structures appear within normal limits. No suspicious lymphadenopathy. Degenerative changes of the   cervical spine with reversal of the normal cervical lordosis. No acute or suspicious osseous lesions.      Impression:      Impression:    Occlusion of the proximal M2 segment of the right MCA. No evidence of core infarct with ischemic penumbra present within the right MCA territory, however unable to quantify given background of generalized hypoperfusion.    Findings discussed with Gautam of the stroke team at 9:34 p.m. on 2/5/2024.      Electronically Signed: Raymond Garcia MD    2/5/2024 9:56 PM EST    Workstation ID: [id number]    CT CEREBRAL PERFUSION WITH & WITHOUT CONTRAST [[id number]] Collected: 02/05/24 2129     Updated: 02/05/24 2159    Narrative:      CT ANGIOGRAM HEAD W AI ANALYSIS OF LVO, CT ANGIOGRAM NECK, CT CEREBRAL PERFUSION W WO CONTRAST    Date of Exam: 2/5/2024 8:46 PM EST    Indication: Neuro deficit, acute stroke suspected Neuro deficit, acute stroke suspected.    Comparison: Concurrently performed noncontrast head CT.    Technique: Axial CT images of the brain were obtained prior to and after the administration of 150 mL Isovue-370. CT Perfusion protocol was utilized. Automated post processing was performed by RAPID software and submitted to PACS for interpretation.  CTA   of the head and neck were performed after the  administration of iodinated contrast.  Reconstructed coronal and sagittal images were also obtained. A 3-D volume rendered image was created for interpretation. Automated exposure control and iterative   reconstruction methods were used.    Findings:  CT Perfusion:  No evidence of core infarct. Ischemic penumbra present within the right MCA territory, however unable to quantify given background of generalized hypoperfusion.    Vascular findings:  Visualized portions of the aortic arch are patent without evidence of significant ostial stenosis. Common carotid arteries are patent bilaterally. Mixed atheromatous disease of the bilateral carotid artery bifurcations without significant stenosis by   NASCET criteria. The cervical portions of the internal carotid arteries are patent bilaterally. The intracranial portions of the bilateral internal carotid arteries are patent with atherosclerotic disease present. Abrupt vessel cut off of the proximal M2   segment of the right MCA. Left MCA is patent. 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 The vertebral arteries are patent bilaterally. The basilar artery is patent. The   posterior cerebral arteries are patent bilaterally. No evidence of dissection or aneurysm.    Nonvascular findings:  Lung apices are grossly clear with mild paraseptal emphysematous changes present. 1.5 cm right thyroid nodule. Patient is edentulous. Aerodigestive structures appear within normal limits. No suspicious lymphadenopathy. Degenerative changes of the   cervical spine with reversal of the normal cervical lordosis. No acute or suspicious osseous lesions.      Impression:      Impression:    Occlusion of the proximal M2 segment of the right MCA. No evidence of core infarct with ischemic penumbra present within the right MCA territory, however unable to quantify given background of generalized hypoperfusion.    Findings  discussed with Gautam of the stroke team at 9:34 p.m. on 2/5/2024.      Electronically Signed: Raymond Garcia MD    2/5/2024 9:56 PM EST    Workstation ID: [id number]    XR Chest 1 View [[id number]] Collected: 02/05/24 2150     Updated: 02/05/24 2154    Narrative:      XR CHEST 1 VW    Date of Exam: 2/5/2024 9:39 PM EST    Indication: Acute Stroke Protocol (onset < 12 hrs)    Comparison: None available.    Findings: No focal consolidation. No pneumothorax or pleural effusion. Cardiac size is normal. The visualized clavicles appear intact. No displaced rib fractures. The visualized upper abdomen is normal.      Impression:      Impression: No acute cardiopulmonary disease.    Electronically Signed: Souleymane Boone MD    2/5/2024 9:51 PM EST    Workstation ID: [id number]    CT Head Without Contrast Stroke Protocol [[id number]] Collected: 02/05/24 2047     Updated: 02/05/24 2055    Narrative:      CT HEAD WO CONTRAST STROKE PROTOCOL    Date of Exam: 2/5/2024 8:42 PM EST    Indication: Neuro deficit, acute, stroke suspected.    Comparison: Brain MRI and head CT 2/4/2023.    Technique: Axial CT images were obtained of the head without contrast administration.  Reconstructed coronal images were also obtained. Automated exposure control and iterative construction methods were used.    Scan Time: 20:42  Results discussed with the clinical team via telephone by Raymond Garcia MD at 20:45.    Findings:    No evidence of acute intracranial hemorrhage or mass effect. No extra-axial collection. The gray white matter differentiation is preserved. Global parenchymal atrophy. Periventricular and subcortical white matter hypodensity, nonspecific, but likely   sequela of chronic small vessel ischemic disease. Chronic infarcts in the left cerebellum and right basal ganglia.    The mastoid air cells and paranasal sinuses are well aerated. Globes and extraocular muscles are unremarkable. No acute or suspicious osseous abnormality. Soft tissues  within normal limits.      Impression:      Impression:    No evidence of acute intracranial abnormality.    Similar chronic findings, including advanced white matter change that is likely sequela of chronic small vessel ischemic disease.      Electronically Signed: Raymond Garcia MD    2/5/2024 8:52 PM EST    Workstation ID: [id number]          ECG/EMG Results (last 24 hours)       Procedure Component Value Units Date/Time    ECG 12 Lead ED Triage Standing Order; Acute Stroke (Onset <24 hrs) [[id number]] Collected: 02/05/24 2152     Updated: 02/05/24 2153     QT Interval 376 ms      QTC Interval 470 ms     Narrative:      Test Reason : ED Triage Standing Order~  Blood Pressure :   */*   mmHG  Vent. Rate :  94 BPM     Atrial Rate :  94 BPM     P-R Int : 144 ms          QRS Dur :  76 ms      QT Int : 376 ms       P-R-T Axes :  67  52  78 degrees     QTc Int : 470 ms    Normal sinus rhythm  Possible Left atrial enlargement  Left ventricular hypertrophy  Nonspecific ST and T wave abnormality  Abnormal ECG  When compared with ECG of 07-SEP-2023 05:09,  No significant change was found    Referred By: EDMD           Confirmed By:             I reviewed the patient's new laboratory and imaging results.  I independently reviewed the patient's new images.    Assessment & Plan   A / P     Ms. Michael is a 64yo F with a history of T1DM (insulin pump), GERD, TIA, HLD, HTN and Afib on Xarelto who presented to the [facility] ED via EMS on 2/5/24 with left sided weakness, facial droop, and slurred speech which started at 1940. Imaging showed a M2 occlusion of the proximal right MCA.     She was not a candidate for TNK secondary to anticoagulation. She was taken to the cath lab by Dr. Coronado where she underwent mechanical thrombectomy of the right MCA.     She is admitted to the ICU after the procedure. Her initial NIHSS was a 7 and it is now a 2.       Nutrition:   NPO Diet NPO Type: Strict NPO  Advance Directives:   Code Status and Medical  Interventions:   Ordered at: 02/05/24 2205     Code Status (Patient has no pulse and is not breathing):    CPR (Attempt to Resuscitate)     Medical Interventions (Patient has pulse or is breathing):    Full Support         CVA (cerebral vascular accident)    Diabetic peripheral neuropathy    Hyperlipidemia    Hypertension    Heart valve disease    Uncontrolled type 1 diabetes mellitus with hyperglycemia    Gastroparesis    PFO (patent foramen ovale)    Atrial fibrillation and flutter    Acute-on-CKD (chronic kidney disease) stage 3, GFR 30-59 ml/min    Hypertensive urgency      Assessment / Plan:    Admit to Neuro ICU  Further CVA management per Stroke Neurology.   Cardene for blood pressure control. Add PRN Hydralazine and then an Esmolol infusion if SBP remains > 140.  Echocardiogram with bubble.   Speech, PT/OT  Subcutaneous insulin to replace insulin pump while inpatient.   Resume Losartan when cleared for PO intake.   Will need to clarify if she was taking Xarelto as it does not appear to have been filled since October.   AM labs    High risk secondary to CVA with mechanical thrombectomy.     Plan of care and goals reviewed during interdisciplinary rounds.  I discussed the patient's findings and my recommendations with patient and nursing staff      Silvia V Case, DO  Pulmonary and Critical Care Medicine      4 minutes of critical care provided by HOWIE Mock in addendum accordance with split/shared billing. This time excludes other billable procedures. Time does include preparation of documents, medical consultations, review of old records, and direct bedside care. Patient is at high risk for life-threatening deterioration due to CVA.   Electronically signed by HOWIE Riley, 02/05/24, 9:55 PM EST.

## 2024-02-06 NOTE — BRIEF OP NOTE
"CV IR MECHANICAL THROMBECTOMY - PRIMARY  Progress Note    Thelma Michael  2/5/2024    Pre-op Diagnosis:   Acute CVA, right MCA thromboembolic occlusion       Post-Op Diagnosis Codes:  Acute CVA, right MCA thromboembolic occlusion    Procedure/CPT® Codes:        Procedure(s):  IR mechanical thrombectomy              Surgeon(s):  Rambo Coronado MD    Anesthesia: * No anesthesia type entered *    Staff:   Scrub Person: Delia Macdonald  Documenter: Misa Walker RN  Invasive Nurse: Yara Campbell RN; Cait Garcia RN         Estimated Blood Loss: minimal    Urine Voided: * No values recorded between 2/5/2024 10:19 PM and 2/5/2024 11:27 PM *    Specimens:                None          Drains:   Urethral Catheter Double-lumen 16 Fr. (Active)   Output (mL) 300 mL 02/05/24 2327       [REMOVED] Urethral Catheter Silicone (Removed)       [REMOVED] External Urinary Catheter (Removed)       [REMOVED] External Urinary Catheter (Removed)       [REMOVED] External Urinary Catheter (Removed)       [REMOVED] External Urinary Catheter (Removed)       [REMOVED] External Urinary Catheter (Removed)       Findings: There is abrupt occlusion of the dominant M2 branch of the right MCA bifurcation, consistent with an acute thromboembolic event.  This responded well to mechanical thrombectomy (Solitaire), with successful extraction of some thrombus and restoration of normal (TICI 3) flow to the right cerebral hemisphere.    There was no carotid \"culprit\" lesion, and the findings are consistent with a cardiac thromboembolic event in this patient with known history of atrial fibrillation (on Xarelto).      Complications: None apparent.          Rambo Coronado MD     Date: 2/5/2024  Time: 23:27 EST        "

## 2024-02-06 NOTE — THERAPY EVALUATION
Patient Name: Thelma Michael  : 1960    MRN: [id number]                              Today's Date: 2024       Admit Date: 2024    Visit Dx:     ICD-10-CM ICD-9-CM   1. Cerebrovascular accident (CVA) due to occlusion of right middle cerebral artery  I63.511 434.91   2. Dysphagia, unspecified type  R13.10 787.20   3. Dysarthria  R47.1 784.51   4. Cognitive communication deficit  R41.841 799.52     Patient Active Problem List   Diagnosis    Diabetic peripheral neuropathy    Hyperlipidemia    Hypertension    Osteoporosis    Tobacco use    Heart valve disease    Iron deficiency anemia secondary to inadequate dietary iron intake    Acute kidney injury    DKA (diabetic ketoacidoses)    Uncontrolled type 1 diabetes mellitus with hyperglycemia    Gastroparesis    PFO (patent foramen ovale)    Atrial fibrillation and flutter    Intractable vomiting    Refractory nausea and vomiting    HHS vs mild DKA    Acute-on-CKD (chronic kidney disease) stage 3, GFR 30-59 ml/min    Hypertensive urgency    Urinary retention    Family history of transient ischemic attacks    Type 1 diabetes mellitus with hypoglycemia with coma    PAF (paroxysmal atrial fibrillation)    Anxiety associated with depression    Clavicle fracture    CVA (cerebral vascular accident)     Past Medical History:   Diagnosis Date    Acid reflux     Acute bronchitis     Cardiac murmur     Depression with anxiety 10/05/2020    Diabetes mellitus     Diabetes mellitus type I     Gastroesophageal reflux disease 2016    H/O echocardiogram 2012    i. LVEF 65%.ii. Mild LVH.iii. Borderline evidence of atrial septal aneurysm.  No PFO.     History of nuclear stress test 2014    Negative for ischemia and scars; LVEF 77%.      History of TIAs 07/15/2021    Hyperlipidemia     Hypertension     Impacted cerumen of both ears     Migraine     Migraines 10/31/2019    Self-catheterizes urinary bladder     Sinusitis     Stroke     Tobacco abuse      quit 4 days ago.      Urticaria     Vitamin D deficiency 05/13/2016     Past Surgical History:   Procedure Laterality Date    CAPSULE ENDOSCOPY  07/27/2021    Procedure: PILLCAM DEPLOYMENT;  Surgeon: Mikael Worthy MD;  Location:  [facility];  Service: Gastroenterology;;    COLONOSCOPY      COLONOSCOPY N/A 07/27/2021    Procedure: COLONOSCOPY;  Surgeon: Mikael Worthy MD;  Location:  [facility];  Service: Gastroenterology;  Laterality: N/A;    DENTAL PROCEDURE      ENDOSCOPY N/A 06/30/2021    Procedure: ESOPHAGOGASTRODUODENOSCOPY;  Surgeon: Brunner, Mark I, MD;  Location:  [facility];  Service: Gastroenterology;  Laterality: N/A;    ENDOSCOPY N/A 07/27/2021    Procedure: ESOPHAGOGASTRODUODENOSCOPY;  Surgeon: Mikael Worthy MD;  Location:  [facility];  Service: Gastroenterology;  Laterality: N/A;    ENDOSCOPY WITH JTUBE N/A 03/16/2022    Procedure: ESOPHAGOGASTRODUODENOSCOPY WITH JEJUNAL TUBE INSERTION;  Surgeon: Thom Glover MD;  Location:  [facility];  Service: Gastroenterology;  Laterality: N/A;    GASTRIC STIMULATOR IMPLANT SURGERY N/A     For gastropareisis    UPPER GASTROINTESTINAL ENDOSCOPY        General Information       Row Name 02/06/24 1523          Physical Therapy Time and Intention    Document Type evaluation  -KG     Mode of Treatment physical therapy  -KG       Row Name 02/06/24 1523          General Information    Patient Profile Reviewed yes  -KG     Prior Level of Function independent:;all household mobility;gait;transfer;ADL's;dressing;bathing  -KG     Existing Precautions/Restrictions fall;other (see comments)  NG; L sided weakness; lethargy; poor command following  -KG     Barriers to Rehab medically complex;cognitive status  -KG       Row Name 02/06/24 1523          Living Environment    People in Home child(bronson), adult  -KG       Row Name 02/06/24 1523          Home Main Entrance    Number of Stairs, Main Entrance five  unclear; TBA further  -KG        Row Name 02/06/24 1523          Stairs Within Home, Primary    Number of Stairs, Within Home, Primary twelve  unclear; TBA further  -KG       Row Name 02/06/24 1523          Cognition    Orientation Status (Cognition) oriented to;person  -KG       Row Name 02/06/24 1523          Safety Issues, Functional Mobility    Safety Issues Affecting Function (Mobility) ability to follow commands;awareness of need for assistance;insight into deficits/self-awareness;safety precaution awareness;safety precautions follow-through/compliance;sequencing abilities  -KG     Impairments Affecting Function (Mobility) balance;cognition;coordination;endurance/activity tolerance;motor control;motor planning;postural/trunk control;range of motion (ROM);strength  -KG     Cognitive Impairments, Mobility Safety/Performance attention;awareness, need for assistance;insight into deficits/self-awareness;safety precaution awareness;safety precaution follow-through;sequencing abilities  -KG               User Key  (r) = Recorded By, (t) = Taken By, (c) = Cosigned By      Initials Name Provider Type    KG Kellen Covarrubias, PT Physical Therapist                   Mobility       Row Name 02/06/24 1525          Bed Mobility    Bed Mobility sit-supine;rolling left;rolling right  -KG     Rolling Left [place] (Bed Mobility) maximum assist (25% patient effort);2 person assist;verbal cues  -KG     Rolling Right [place] (Bed Mobility) maximum assist (25% patient effort);2 person assist;verbal cues  -KG     Sit-Supine [place] (Bed Mobility) dependent (less than 25% patient effort);2 person assist;verbal cues  -KG     Assistive Device (Bed Mobility) draw sheet;head of bed elevated  -KG     Comment, (Bed Mobility) VC's for sequencing and technique. Pt required total assistance with trunk and BLEs when returning to supine. Rolled L and R for hygiene.  -KG       Row Name 02/06/24 1525          Transfers    Comment, (Transfers) STS from EOB  with blocking of estephanie knees and B UE support. Pt unable to weight bear through L LE. Significant anterior and lateral lean to the L during stand.  -KG       Row Name 02/06/24 1525          Sit-Stand Transfer    Sit-Stand [place] (Transfers) maximum assist (25% patient effort);2 person assist;verbal cues  -KG     Assistive Device (Sit-Stand Transfers) other (see comments)  B UE support  -KG       Row Name 02/06/24 1525          Gait/Stairs (Locomotion)    [place] Level (Gait) unable to assess  -KG     Comment, (Gait/Stairs) Ambulation deferred. Not appropriate to assess.  -KG               User Key  (r) = Recorded By, (t) = Taken By, (c) = Cosigned By      Initials Name Provider Type    Kellen Santos PT Physical Therapist                   Obj/Interventions       Row Name 02/06/24 1528          Range of Motion Comprehensive    General Range of Motion no range of motion deficits identified  -KG     Comment, General Range of Motion B LE WFL  -KG       Row Name 02/06/24 1528          Strength Comprehensive (MMT)    Comment, General Manual Muscle Testing (MMT) Assessment R LE grossly 3+/5; L LE grossly 0/5; limited formal assessment due to increased lethargy and poor command following  -KG       Row Name 02/06/24 1528          Balance    Balance Assessment sitting static balance;standing static balance  -KG     Static Sitting Balance maximum assist  initially modA; regressed to maxA with fatigue  -KG     Position, Sitting Balance supported;sitting edge of bed  -KG     Static Standing Balance maximum assist;2-person assist  -KG     Position/Device Used, Standing Balance supported  -KG       Row Name 02/06/24 1528          Sensory Assessment (Somatosensory)    Sensory Assessment (Somatosensory) unable/difficult to assess  -KG               User Key  (r) = Recorded By, (t) = Taken By, (c) = Cosigned By      Initials Name Provider Type    Kellen Santos PT Physical Therapist                    Goals/Plan       Row Name 02/06/24 1531          Bed Mobility Goal 1 (PT)    Activity/Assistive Device (Bed Mobility Goal 1, PT) sit to supine;supine to sit  -KG     [place] Level/Cues Needed (Bed Mobility Goal 1, PT) moderate assist (50-74% patient effort)  -KG     Time Frame (Bed Mobility Goal 1, PT) 2 weeks  -KG     Progress/Outcomes (Bed Mobility Goal 1, PT) goal ongoing  -KG       Row Name 02/06/24 1531          Transfer Goal 1 (PT)    Activity/Assistive Device (Transfer Goal 1, PT) sit-to-stand/stand-to-sit;bed-to-chair/chair-to-bed  -KG     [place] Level/Cues Needed (Transfer Goal 1, PT) moderate assist (50-74% patient effort)  -KG     Time Frame (Transfer Goal 1, PT) 2 weeks  -KG     Progress/Outcome (Transfer Goal 1, PT) goal ongoing  -KG       Row Name 02/06/24 1531          Gait Training Goal 1 (PT)    Activity/Assistive Device (Gait Training Goal 1, PT) gait (walking locomotion)  -KG     [place] Level (Gait Training Goal 1, PT) maximum assist (25-49% patient effort)  -KG     Distance (Gait Training Goal 1, PT) 5 feet  -KG     Time Frame (Gait Training Goal 1, PT) 2 weeks  -KG     Progress/Outcome (Gait Training Goal 1, PT) goal ongoing  -KG       Row Name 02/06/24 1531          Therapy Assessment/Plan (PT)    Planned Therapy Interventions (PT) balance training;bed mobility training;gait training;strengthening;transfer training  -KG               User Key  (r) = Recorded By, (t) = Taken By, (c) = Cosigned By      Initials Name Provider Type    KG Kellen Covarrubias N, PT Physical Therapist                   Clinical Impression       Row Name 02/06/24 1529          Pain    Additional Documentation Pain Scale: FACES Pre/Post-Treatment (Group)  -KG       Row Name 02/06/24 1529          Pain Scale: FACES Pre/Post-Treatment    Pain: FACES Scale, Pretreatment 0-->no hurt  -KG     Posttreatment Pain Rating 0-->no hurt  -KG       Row Name 02/06/24 1529          Plan of Care Review    Plan of Care  Reviewed With patient  -KG     Outcome Evaluation PT initial evaluation completed for pt s/p mechanical thrombectomy for R MCA CVA presenting with L sided weakness, impaired balance and coordination, increased lethargy, poor command following, and decreased functional mobility. Pt required maxA x2 for STS transfers. Pt's decreased independence warrants PT skilled care. Recommend D/C to IP rehab facility.  -KG       Row Name 02/06/24 1529          Therapy Assessment/Plan (PT)    Rehab Potential (PT) good, to achieve stated therapy goals  -KG     Criteria for Skilled Interventions Met (PT) yes;skilled treatment is necessary  -KG     Therapy Frequency (PT) daily  -KG       Row Name 02/06/24 1529          Vital Signs    Pre Systolic BP Rehab 139  -KG     Pre Treatment Diastolic BP 59  -KG     Post Systolic BP Rehab 133  -KG     Post Treatment Diastolic BP 53  -KG     Pretreatment Heart Rate (beats/min) 96  -KG     Posttreatment Heart Rate (beats/min) 99  -KG     Pre SpO2 (%) 95  -KG     O2 Delivery Pre Treatment room air  -KG     Post SpO2 (%) 94  -KG     O2 Delivery Post Treatment room air  -KG     Pre Patient Position Sitting  -KG     Intra Patient Position Standing  -KG     Post Patient Position Supine  -KG       Row Name 02/06/24 1529          Positioning and Restraints    Pre-Treatment Position in bed  -KG     Post Treatment Position bed  -KG     In Bed notified nsg;supine;call light within reach;encouraged to call for assist;exit alarm on;with family/caregiver;side rails up x3;RUE elevated;LUE elevated  -KG     Restraints released:;reapplied:;notified nsg:;soft limb  -KG               User Key  (r) = Recorded By, (t) = Taken By, (c) = Cosigned By      Initials Name Provider Type    KG Kellen Covarrubias, PT Physical Therapist                   Outcome Measures       Row Name 02/06/24 1532          How much help from another person do you currently need...    Turning from your back to your side while in flat bed  without using bedrails? 2  -KG     Moving from lying on back to sitting on the side of a flat bed without bedrails? 2  -KG     Moving to and from a bed to a chair (including a wheelchair)? 1  -KG     Standing up from a chair using your arms (e.g., wheelchair, bedside chair)? 2  -KG     Climbing 3-5 steps with a railing? 1  -KG     To walk in hospital room? 1  -KG     AM-PAC 6 Clicks Score (PT) 9  -KG     Highest Level of Mobility Goal 3 --> Sit at edge of bed  -KG       Row Name 02/06/24 1532          Modified Lyon Scale    Pre-Stroke Modified Lyon Scale 6 - Unable to determine (UTD) from the medical record documentation  -KG     Modified [place] Scale 4 - Moderately severe disability.  Unable to walk without assistance, and unable to attend to own bodily needs without assistance.  -KG       Row Name 02/06/24 1532          Functional Assessment    Outcome Measure Options AM-PAC 6 Clicks Basic Mobility (PT);Modified [place]  -KG               User Key  (r) = Recorded By, (t) = Taken By, (c) = Cosigned By      Initials Name Provider Type    Kellen Santos, PT Physical Therapist                                 Physical Therapy Education       Title: PT OT SLP Therapies (In Progress)       Topic: Physical Therapy (In Progress)       Point: Mobility training (In Progress)       Learning Progress Summary             Patient Acceptance, E, NR by KG at 2/6/2024 1416                         Point: Home exercise program (Not Started)       Learner Progress:  Not documented in this visit.              Point: Body mechanics (In Progress)       Learning Progress Summary             Patient Acceptance, E, NR by KG at 2/6/2024 1416                         Point: Precautions (In Progress)       Learning Progress Summary             Patient Acceptance, E, NR by KG at 2/6/2024 1416                                         User Key       Initials Effective Dates Name Provider Type Discipline    [facility] 05/22/20 -  Marci  Kellen ADAM, PT Physical Therapist PT                  PT Recommendation and Plan  Planned Therapy Interventions (PT): balance training, bed mobility training, gait training, strengthening, transfer training  Plan of Care Reviewed With: patient  Outcome Evaluation: PT initial evaluation completed for pt s/p mechanical thrombectomy for R MCA CVA presenting with L sided weakness, impaired balance and coordination, increased lethargy, poor command following, and decreased functional mobility. Pt required maxA x2 for STS transfers. Pt's decreased independence warrants PT skilled care. Recommend D/C to IP rehab facility.     Time Calculation:   PT Evaluation Complexity  History, PT Evaluation Complexity: 3 or more personal factors and/or comorbidities  Examination of Body Systems (PT Eval Complexity): total of 3 or more elements  Clinical Presentation (PT Evaluation Complexity): evolving  Clinical Decision Making (PT Evaluation Complexity): moderate complexity  Overall Complexity (PT Evaluation Complexity): moderate complexity     PT Charges       Row Name 02/06/24 1416             Time Calculation    Start Time 1416  -KG      PT Received On 02/06/24  -KG      PT Goal Re-Cert Due Date 02/16/24  -KG         Untimed Charges    PT Eval/Re-eval Minutes 46  -KG         Total Minutes    Untimed Charges Total Minutes 46  -KG       Total Minutes 46  -KG                User Key  (r) = Recorded By, (t) = Taken By, (c) = Cosigned By      Initials Name Provider Type    KG Kellen Covarrubias, PT Physical Therapist                  Therapy Charges for Today       Code Description Service Date Service Provider Modifiers Qty    [id number]  PT EVAL MOD COMPLEXITY 4 2/6/2024 Kellen Covarrubias, PT GP 1            PT G-Codes  Outcome Measure Options: AM-PAC 6 Clicks Basic Mobility (PT), Modified [place]  AM-PAC 6 Clicks Score (PT): 9  Modified [place] Scale: 4 - Moderately severe disability.  Unable to walk without assistance, and  unable to attend to own bodily needs without assistance.  PT Discharge Summary  Anticipated Discharge Disposition (PT): inpatient rehabilitation facility    Kimberlee Covarrubias, PT  2/6/2024

## 2024-02-06 NOTE — PLAN OF CARE
Goal Outcome Evaluation:  Plan of Care Reviewed With: patient, daughter, sibling        Progress: no change  Outcome Evaluation: Pt presents below her functional baseline with deficits including L sided weakness, L inattention, impaired mobility, poor command following, and impaired ADLs warranting skilled OT services. Pt required Max A x 2 for bed mobility and STS. Educated family on PROM to L side and neuro re-education strategies. Rec IPR at dc.      Anticipated Discharge Disposition (OT): inpatient rehabilitation facility

## 2024-02-06 NOTE — THERAPY EVALUATION
Acute Care - Speech Language Pathology Initial Evaluation  [facility]  Cognitive-Communication Evaluation  Clinical Swallow Evaluation     Patient Name: Thelma Michael  : 1960  MRN: [id number]  Today's Date: 2024               Admit Date: 2024     Visit Dx:    ICD-10-CM ICD-9-CM   1. Cerebrovascular accident (CVA) due to occlusion of right middle cerebral artery  I63.511 434.91   2. Dysphagia, unspecified type  R13.10 787.20   3. Dysarthria  R47.1 784.51   4. Cognitive communication deficit  R41.841 799.52     Patient Active Problem List   Diagnosis    Diabetic peripheral neuropathy    Hyperlipidemia    Hypertension    Osteoporosis    Tobacco use    Heart valve disease    Iron deficiency anemia secondary to inadequate dietary iron intake    Acute kidney injury    DKA (diabetic ketoacidoses)    Uncontrolled type 1 diabetes mellitus with hyperglycemia    Gastroparesis    PFO (patent foramen ovale)    Atrial fibrillation and flutter    Intractable vomiting    Refractory nausea and vomiting    HHS vs mild DKA    Acute-on-CKD (chronic kidney disease) stage 3, GFR 30-59 ml/min    Hypertensive urgency    Urinary retention    Family history of transient ischemic attacks    Type 1 diabetes mellitus with hypoglycemia with coma    PAF (paroxysmal atrial fibrillation)    Anxiety associated with depression    Clavicle fracture    CVA (cerebral vascular accident)     Past Medical History:   Diagnosis Date    Acid reflux     Acute bronchitis     Cardiac murmur     Depression with anxiety 10/05/2020    Diabetes mellitus     Diabetes mellitus type I     Gastroesophageal reflux disease 2016    H/O echocardiogram 2012    i. LVEF 65%.ii. Mild LVH.iii. Borderline evidence of atrial septal aneurysm.  No PFO.     History of nuclear stress test 2014    Negative for ischemia and scars; LVEF 77%.      History of TIAs 07/15/2021    Hyperlipidemia     Hypertension     Impacted cerumen of both ears      Migraine     Migraines 10/31/2019    Self-catheterizes urinary bladder     Sinusitis     Stroke     Tobacco abuse     quit 4 days ago.      Urticaria     Vitamin D deficiency 05/13/2016     Past Surgical History:   Procedure Laterality Date    CAPSULE ENDOSCOPY  07/27/2021    Procedure: PILLCAM DEPLOYMENT;  Surgeon: Mikael Worthy MD;  Location:  [facility];  Service: Gastroenterology;;    COLONOSCOPY      COLONOSCOPY N/A 07/27/2021    Procedure: COLONOSCOPY;  Surgeon: Mikael Worthy MD;  Location:  [facility];  Service: Gastroenterology;  Laterality: N/A;    DENTAL PROCEDURE      ENDOSCOPY N/A 06/30/2021    Procedure: ESOPHAGOGASTRODUODENOSCOPY;  Surgeon: Brunner, Mark I, MD;  Location:  [facility];  Service: Gastroenterology;  Laterality: N/A;    ENDOSCOPY N/A 07/27/2021    Procedure: ESOPHAGOGASTRODUODENOSCOPY;  Surgeon: Mikael Worthy MD;  Location:  [facility];  Service: Gastroenterology;  Laterality: N/A;    ENDOSCOPY WITH JTUBE N/A 03/16/2022    Procedure: ESOPHAGOGASTRODUODENOSCOPY WITH JEJUNAL TUBE INSERTION;  Surgeon: Thom Glover MD;  Location:  [facility];  Service: Gastroenterology;  Laterality: N/A;    GASTRIC STIMULATOR IMPLANT SURGERY N/A     For gastropareisis    UPPER GASTROINTESTINAL ENDOSCOPY         SLP Recommendation and Plan  SLP Diagnosis: moderate-severe, dysarthria, cognitive-linguistic disorder (02/06/24 0830)  SLP Diagnosis Comments: Will further assess when more alert (02/06/24 0830)        Swallow Criteria for Skilled Therapeutic Interventions Met: demonstrates skilled criteria (02/06/24 0830)  SLC Criteria for Skilled Therapy Interventions Met: yes (02/06/24 0830)  Anticipated Discharge Disposition (SLP): inpatient rehabilitation facility (02/06/24 0830)        Therapy Frequency (SLP SLC): 5 days per week (02/06/24 0830)  Predicted Duration Therapy Intervention (Days): until discharge (02/06/24 0830)  Oral Care Recommendations: Oral Care  BID/PRN, Suction toothbrush (02/06/24 0830)                          Plan of Care Reviewed With: patient, daughter (02/06/24 0942)      SLP EVALUATION (last 72 hours)       SLP SLC Evaluation       Row Name 02/06/24 0830                   Communication Assessment/Intervention    Document Type evaluation  -SM        Subjective Information no complaints  -        Patient Observations lethargic  -           General Information    Patient Profile Reviewed yes  -SM        Pertinent History Of Current Problem Adm L weakness, droop, and slurred speech. R MCA CVA s/p mechanical thrombectomy.  -        Plans/Goals Discussed with family;agreed upon  -        Barriers to Rehab none identified  -        Patient's Goals for Discharge patient could not state  -        Family Goals for Discharge family did not state  -           Pain    Additional Documentation Pain Scale: FACES Pre/Post-Treatment (Group)  -           Pain Scale: FACES Pre/Post-Treatment    Pain: FACES Scale, Pretreatment 0-->no hurt  -SM        Posttreatment Pain Rating 0-->no hurt  -           Comprehension Assessment/Intervention    Comprehension Assessment/Intervention Auditory Comprehension  -           Auditory Comprehension Assessment/Intervention    Answers Questions (Communication) yes/no;wh questions;personal;simple;moderate impairment;severe impairment  -        Able to Follow Commands (Communication) 1-step;moderate impairment;severe impairment  -        Successful Auditory Strategies (Communication) repetition;visual cues;other (see comments)  -        Auditory Comprehension Communication, Comment Decreased alertness. When responded, basic questions appropriate  -           Expression Assessment/Intervention    Expression Assessment/Intervention verbal expression  -           Verbal Expression Assessment/Intervention    Automatic Speech (Communication) response to greeting;WFL  -        Spontaneous/Functional Words  moderate impairment  -        Verbal Expression, Comment Negatively impacted by decreased alertness and severity of dysarthria  -           Oral Musculature and Cranial Nerve Assessment    Oral Labial or Buccal Impairment, Detail, Cranial Nerve VII (Facial): right labial droop;reduced ROM  -        Lingual Impairment, Detail. Cranial Nerves IX, XII (Glossopharyngeal and Hypoglossal) reduced lingual ROM;reduced strength;bilaterally  -           Motor Speech Assessment/Intervention    Motor Speech Function moderate impairment;severe impairment  -        Characteristics Consistent with Dysarthria slow rate;decreased articulation;decreased intensity;slurred speech  -        Speech intelligibility 40%;in quiet environment  -           Cursory Voice Assessment/Intervention    Quality and Resonance (Voice) unable/difficult to assess  decreased verbal output  -           Cognitive Assessment Intervention- SLP    Cognitive Function (Cognition) unable/difficult to assess  -        Orientation Status (Cognition) person;place  -        Cognition, Comment Further dx tx when more alert  -SM           SLP Evaluation Clinical Impressions    SLP Diagnosis moderate-severe;dysarthria;cognitive-linguistic disorder  -        SLP Diagnosis Comments Will further assess when more alert  -        Rehab Potential/Prognosis good  -        SLC Criteria for Skilled Therapy Interventions Met yes  -        Functional Impact difficulty communicating wants, needs  -           Recommendations    Therapy Frequency (SLP SLC) 5 days per week  -        Predicted Duration Therapy Intervention (Days) until discharge  -        Anticipated Discharge Disposition (SLP) inpatient rehabilitation facility  -                  User Key  (r) = Recorded By, (t) = Taken By, (c) = Cosigned By      Initials Name Effective Dates    Denisse Blue MS CCC-SLP 02/03/23 -                        EDUCATION  The patient has been  educated in the following areas:     Cognitive Impairment Communication Impairment.           SLP GOALS       Row Name 02/06/24 0830             Patient will demonstrate functional cognitive-linguistic skills for return to discharge environment    [place] with moderate cues  -      Time frame by discharge  -         SLP Diagnostic Treatment     Patient will participate in further assessment in the following areas reading comprehension;graphic expression;cognitive-linguistic  -      Time Frame (Diagnostic) short term goal (STG)  -         Comprehend Questions Goal 1 (SLP)    Improve Ability to Comprehend Questions Goal 1 (SLP) simple yes/no questions;100%;independently (over 90% accuracy)  -      Time Frame (Comprehend Questions Goal 1, SLP) short term goal (STG)  -         Follow Directions Goal 2 (SLP)    Improve Ability to Follow Directions Goal 1 (SLP) 1 step direction without objects;100%;independently (over 90% accuracy)  -      Time Frame (Follow Directions Goal 1, SLP) short term goal (STG)  -         Word Retrieval Skills Goal 1 (SLP)    Improve Word Retrieval Skills By Goal 1 (SLP) answer WH question with one word;100%;independently (over 90% accuracy)  -      Time Frame (Word Retrieval Goal 1, SLP) short term goal (STG)  -         Articulation Goal 1 (SLP)    Improve Articulation Goal 1 (SLP) by over-articulating at word level;80%;with moderate cues (50-74%)  -      Time Frame (Articulation Goal 1, SLP) short term goal (STG)  -                User Key  (r) = Recorded By, (t) = Taken By, (c) = Cosigned By      Initials Name Provider Type    Denisse Blue MS CCC-SLP Speech and Language Pathologist                            Time Calculation:      Time Calculation- SLP       Row Name 02/06/24 0943             Time Calculation- SLP    SLP Start Time 0830  -      SLP Received On 02/06/24  -         Untimed Charges    35801-RM Eval Speech and Production w/ Language  Minutes 23  -SM      42795-KK Eval Oral Pharyng Swallow Minutes 23  -SM         Total Minutes    Untimed Charges Total Minutes 46  -SM       Total Minutes 46  -SM                User Key  (r) = Recorded By, (t) = Taken By, (c) = Cosigned By      Initials Name Provider Type    Denisse Blue MS CCC-SLP Speech and Language Pathologist                    Therapy Charges for Today       Code Description Service Date Service Provider Modifiers Qty    86815246310 HC ST EVAL ORAL PHARYNG SWALLOW 2 2024 Denisse Figueroa MS CCC-SLP GN 1    [id number] HC ST EVAL SPEECH AND PROD W LANG  2 2024 Denisse Figueroa MS CCC-SLP GN 1                       Denisse Figueroa MS CCC-SLP  2024   and Acute Care - Speech Language Pathology   Swallow Initial Evaluation [facility]     Patient Name: Thelma Michael  : 1960  MRN: [id number]  Today's Date: 2024               Admit Date: 2024    Visit Dx:     ICD-10-CM ICD-9-CM   1. Cerebrovascular accident (CVA) due to occlusion of right middle cerebral artery  I63.511 434.91   2. Dysphagia, unspecified type  R13.10 787.20   3. Dysarthria  R47.1 784.51   4. Cognitive communication deficit  R41.841 799.52     Patient Active Problem List   Diagnosis    Diabetic peripheral neuropathy    Hyperlipidemia    Hypertension    Osteoporosis    Tobacco use    Heart valve disease    Iron deficiency anemia secondary to inadequate dietary iron intake    Acute kidney injury    DKA (diabetic ketoacidoses)    Uncontrolled type 1 diabetes mellitus with hyperglycemia    Gastroparesis    PFO (patent foramen ovale)    Atrial fibrillation and flutter    Intractable vomiting    Refractory nausea and vomiting    HHS vs mild DKA    Acute-on-CKD (chronic kidney disease) stage 3, GFR 30-59 ml/min    Hypertensive urgency    Urinary retention    Family history of transient ischemic attacks    Type 1 diabetes mellitus with hypoglycemia with coma    PAF (paroxysmal  atrial fibrillation)    Anxiety associated with depression    Clavicle fracture    CVA (cerebral vascular accident)     Past Medical History:   Diagnosis Date    Acid reflux     Acute bronchitis     Cardiac murmur     Depression with anxiety 10/05/2020    Diabetes mellitus     Diabetes mellitus type I     Gastroesophageal reflux disease 05/13/2016    H/O echocardiogram 08/07/2012    i. LVEF 65%.ii. Mild LVH.iii. Borderline evidence of atrial septal aneurysm.  No PFO.     History of nuclear stress test 08/22/2014    Negative for ischemia and scars; LVEF 77%.      History of TIAs 07/15/2021    Hyperlipidemia     Hypertension     Impacted cerumen of both ears     Migraine     Migraines 10/31/2019    Self-catheterizes urinary bladder     Sinusitis     Stroke     Tobacco abuse     quit 4 days ago.      Urticaria     Vitamin D deficiency 05/13/2016     Past Surgical History:   Procedure Laterality Date    CAPSULE ENDOSCOPY  07/27/2021    Procedure: PILLCAM DEPLOYMENT;  Surgeon: Mikael Worthy MD;  Location:  [facility];  Service: Gastroenterology;;    COLONOSCOPY      COLONOSCOPY N/A 07/27/2021    Procedure: COLONOSCOPY;  Surgeon: Mikael Worthy MD;  Location:  [facility];  Service: Gastroenterology;  Laterality: N/A;    DENTAL PROCEDURE      ENDOSCOPY N/A 06/30/2021    Procedure: ESOPHAGOGASTRODUODENOSCOPY;  Surgeon: Brunner, Mark I, MD;  Location:  [facility];  Service: Gastroenterology;  Laterality: N/A;    ENDOSCOPY N/A 07/27/2021    Procedure: ESOPHAGOGASTRODUODENOSCOPY;  Surgeon: Mikael Worthy MD;  Location:  [facility];  Service: Gastroenterology;  Laterality: N/A;    ENDOSCOPY WITH JTUBE N/A 03/16/2022    Procedure: ESOPHAGOGASTRODUODENOSCOPY WITH JEJUNAL TUBE INSERTION;  Surgeon: Thom Glover MD;  Location:  [facility];  Service: Gastroenterology;  Laterality: N/A;    GASTRIC STIMULATOR IMPLANT SURGERY N/A     For gastropareisis    UPPER GASTROINTESTINAL ENDOSCOPY          SLP Recommendation and Plan  SLP Swallowing Diagnosis: severe, oral dysphagia, suspected pharyngeal dysphagia (02/06/24 0830)  SLP Diet Recommendation: NPO (02/06/24 0830)     SLP Rec. for Method of Medication Administration: meds via alternate route (02/06/24 0830)        Recommended Diagnostics: reassess via clinical swallow evaluation (when more alert) (02/06/24 0830)  Swallow Criteria for Skilled Therapeutic Interventions Met: demonstrates skilled criteria (02/06/24 0830)  Anticipated Discharge Disposition (SLP): inpatient rehabilitation facility (02/06/24 0830)  Rehab Potential/Prognosis, Swallowing: good, to achieve stated therapy goals (02/06/24 0830)     Predicted Duration Therapy Intervention (Days): until discharge (02/06/24 0830)  Oral Care Recommendations: Oral Care BID/PRN, Suction toothbrush (02/06/24 0830)                                        Plan of Care Reviewed With: patient, daughter      SWALLOW EVALUATION (last 72 hours)       SLP Adult Swallow Evaluation       Row Name 02/06/24 0830                   General Eating/Swallowing Observations    Respiratory Support Currently in Use room air  -SM           Clinical Swallow Eval    Oral Prep Phase impaired  -SM        Pharyngeal Phase suspected pharyngeal impairment  -SM           Oral Prep Concerns    Oral Prep Concerns reduced lip opening;incomplete or weak lip closure around spoon;anterior loss  -SM           Pharyngeal Phase Concerns    Pharyngeal Phase Concerns other (see comments)  -SM        Pharyngeal Phase Concerns, Comment Alertness impacting. Alerted with max cues though back asleep with subsequent presentation of ice chip trial. What made it in front of mouth then anteriorly spilled out of mouth. Poor lip closure. Poor lingual movements. Highly suspect will need to remain NPO/Keofeed though will re-assess when more alert.  -SM           SLP Evaluation Clinical Impression    SLP Swallowing Diagnosis severe;oral dysphagia;suspected  pharyngeal dysphagia  -        Functional Impact risk of aspiration/pneumonia  -        Rehab Potential/Prognosis, Swallowing good, to achieve stated therapy goals  -        Swallow Criteria for Skilled Therapeutic Interventions Met demonstrates skilled criteria  -           Recommendations    SLP Diet Recommendation NPO  -        Recommended Diagnostics reassess via clinical swallow evaluation  when more alert  -        Oral Care Recommendations Oral Care BID/PRN;Suction toothbrush  -        SLP Rec. for Method of Medication Administration meds via alternate route  -                  User Key  (r) = Recorded By, (t) = Taken By, (c) = Cosigned By      Initials Name Effective Dates    Denisse Blue MS CCC-SLP 02/03/23 -                     EDUCATION  The patient has been educated in the following areas:   Dysphagia (Swallowing Impairment) Oral Care/Hydration NPO rationale.        SLP GOALS       Row Name 02/06/24 0830             Patient will demonstrate functional cognitive-linguistic skills for return to discharge environment    [place] with moderate cues  -      Time frame by discharge  -         SLP Diagnostic Treatment     Patient will participate in further assessment in the following areas reading comprehension;graphic expression;cognitive-linguistic  -      Time Frame (Diagnostic) short term goal (STG)  -SM         Comprehend Questions Goal 1 (SLP)    Improve Ability to Comprehend Questions Goal 1 (SLP) simple yes/no questions;100%;independently (over 90% accuracy)  -      Time Frame (Comprehend Questions Goal 1, SLP) short term goal (STG)  -SM         Follow Directions Goal 2 (SLP)    Improve Ability to Follow Directions Goal 1 (SLP) 1 step direction without objects;100%;independently (over 90% accuracy)  -      Time Frame (Follow Directions Goal 1, SLP) short term goal (STG)  -SM         Word Retrieval Skills Goal 1 (SLP)    Improve Word Retrieval Skills By Goal 1  (SLP) answer WH question with one word;100%;independently (over 90% accuracy)  -      Time Frame (Word Retrieval Goal 1, SLP) short term goal (STG)  -         Articulation Goal 1 (SLP)    Improve Articulation Goal 1 (SLP) by over-articulating at word level;80%;with moderate cues (50-74%)  -      Time Frame (Articulation Goal 1, SLP) short term goal (STG)  -SM                User Key  (r) = Recorded By, (t) = Taken By, (c) = Cosigned By      Initials Name Provider Type    Denisse Blue MS CCC-SLP Speech and Language Pathologist                       Time Calculation:    Time Calculation- SLP       Row Name 02/06/24 0943             Time Calculation- SLP    SLP Start Time 0830  -      SLP Received On 02/06/24  -         Untimed Charges    80735-WZ Eval Speech and Production w/ Language Minutes 23  -SM      73087-GP Eval Oral Pharyng Swallow Minutes 23  -SM         Total Minutes    Untimed Charges Total Minutes 46  -SM       Total Minutes 46  -SM                User Key  (r) = Recorded By, (t) = Taken By, (c) = Cosigned By      Initials Name Provider Type    Denisse Blue MS CCC-SLP Speech and Language Pathologist                    Therapy Charges for Today       Code Description Service Date Service Provider Modifiers Qty    73976752575 HC ST EVAL ORAL PHARYNG SWALLOW 2 2/6/2024 Denisse Figueroa MS CCC-SLP GN 1    [id number] HC ST EVAL SPEECH AND PROD W LANG  2 2/6/2024 Denisse Figueroa MS CCC-SLP GN 1                 Denisse Figueroa MS CCC-SLP  2/6/2024

## 2024-02-06 NOTE — PLAN OF CARE
Goal Outcome Evaluation:  Plan of Care Reviewed With: patient, daughter        Anticipated Discharge Disposition (SLP): inpatient rehabilitation facility    SLP Diagnosis: moderate-severe, dysarthria, cognitive-linguistic disorder (02/06/24 0830)  SLP Diagnosis Comments: Will further assess when more alert (02/06/24 0830)  SLP Swallowing Diagnosis: severe, oral dysphagia, suspected pharyngeal dysphagia (02/06/24 0830)

## 2024-02-06 NOTE — ED PROVIDER NOTES
[facility]    EMERGENCY DEPARTMENT ENCOUNTER      Pt Name: Thelma Michael  MRN: [id number]  YOB: 1960  Date of evaluation: 2/5/2024  Provider: Inder Sanz DO    CHIEF COMPLAINT       Chief Complaint   Patient presents with    Stroke         HISTORY OF PRESENT ILLNESS  (Location/Symptom, Timing/Onset, Context/Setting, Quality, Duration, Modifying Factors, Severity.)   Thelma Michael is a 63 y.o. female who presents to the emergency department via EMS for evaluation of concern for left-sided weakness, facial droop, slurred speech, onset at approximately 7:40 PM.  Per EMS the family, daughter had noted that the patient was at her normal baseline where she walks with a cane at normal state she found her laying on the left side with some left-sided facial droop, slurred speech was an acute change for her.  They note that the patient has a history of atrial fibrillation, believes she is been compliant with her Xarelto.  The patient states she believes she has had a stroke but is unable to state if she had any type of residual deficits.  She denies any recent illness, no fevers or chills.  She has mild headache currently.  Denies any seizure activity.  No chest pain, shortness of breath, has no other acute systemic complaints at this time.      Nursing notes were reviewed.      PAST MEDICAL HISTORY     Past Medical History:   Diagnosis Date    Acid reflux     Acute bronchitis     Cardiac murmur     Depression with anxiety 10/05/2020    Diabetes mellitus     Diabetes mellitus type I     Gastroesophageal reflux disease 05/13/2016    H/O echocardiogram 08/07/2012    i. LVEF 65%.ii. Mild LVH.iii. Borderline evidence of atrial septal aneurysm.  No PFO.     History of nuclear stress test 08/22/2014    Negative for ischemia and scars; LVEF 77%.      History of TIAs 07/15/2021    Hyperlipidemia     Hypertension     Impacted cerumen of both ears     Migraine     Migraines 10/31/2019     Self-catheterizes urinary bladder     Sinusitis     Stroke     Tobacco abuse     quit 4 days ago.      Urticaria     Vitamin D deficiency 05/13/2016         SURGICAL HISTORY       Past Surgical History:   Procedure Laterality Date    CAPSULE ENDOSCOPY  07/27/2021    Procedure: PILLCAM DEPLOYMENT;  Surgeon: Mikael Worthy MD;  Location:  [facility];  Service: Gastroenterology;;    COLONOSCOPY      COLONOSCOPY N/A 07/27/2021    Procedure: COLONOSCOPY;  Surgeon: Mikael Worthy MD;  Location:  [facility];  Service: Gastroenterology;  Laterality: N/A;    DENTAL PROCEDURE      ENDOSCOPY N/A 06/30/2021    Procedure: ESOPHAGOGASTRODUODENOSCOPY;  Surgeon: Brunner, Mark I, MD;  Location:  [facility];  Service: Gastroenterology;  Laterality: N/A;    ENDOSCOPY N/A 07/27/2021    Procedure: ESOPHAGOGASTRODUODENOSCOPY;  Surgeon: Mikael Worthy MD;  Location:  [facility];  Service: Gastroenterology;  Laterality: N/A;    ENDOSCOPY WITH JTUBE N/A 03/16/2022    Procedure: ESOPHAGOGASTRODUODENOSCOPY WITH JEJUNAL TUBE INSERTION;  Surgeon: Thom Glover MD;  Location:  [facility];  Service: Gastroenterology;  Laterality: N/A;    GASTRIC STIMULATOR IMPLANT SURGERY N/A     For gastropareisis    UPPER GASTROINTESTINAL ENDOSCOPY           CURRENT MEDICATIONS       Current Facility-Administered Medications:     sodium chloride 0.9 % flush 10 mL, 10 mL, Intravenous, PRN, Inder Sanz, DO    Current Outpatient Medications:     acetaminophen (TYLENOL) 325 MG tablet, Take 2 tablets by mouth Every 4 (Four) Hours As Needed for Mild Pain ., Disp: , Rfl:     albuterol sulfate  (90 Base) MCG/ACT inhaler, Inhale 2 puffs Every 4 (Four) Hours As Needed for Wheezing., Disp: 18 g, Rfl: 5    amLODIPine (NORVASC) 10 MG tablet, Take 1 tablet by mouth Daily., Disp: 30 tablet, Rfl: 0    atorvastatin (Lipitor) 80 MG tablet, Take 1 tablet by mouth Daily., Disp: 90 tablet, Rfl: 1    Blood Glucose  Monitoring Suppl (FreeStyle Lite) w/Device kit, 1 Device 3 (Three) Times a Day., Disp: 1 kit, Rfl: 0    calcium carbonate (TUMS) 500 MG chewable tablet, Chew 1,000 mg 3 (Three) Times a Day As Needed for Indigestion or Heartburn., Disp: , Rfl:     carvedilol (COREG) 12.5 MG tablet, Take 1 tablet by mouth 2 (Two) Times a Day With Meals., Disp: , Rfl:     Continuous Blood Gluc Transmit (Dexcom G6 Transmitter) misc, 1 each by Other route Every 3 (Three) Months., Disp: 1 each, Rfl: 3    famotidine (PEPCID) 20 MG tablet, Take 1 tablet by mouth Every Morning., Disp: , Rfl:     ferrous sulfate 325 (65 Fe) MG tablet, Take with Vitamin C or Juice (Patient taking differently: Daily With Breakfast. Take with Vitamin C or Juice), Disp: 30 tablet, Rfl: 0    gabapentin (NEURONTIN) 100 MG capsule, Take 1 capsule by mouth 3 (Three) Times a Day As Needed (back pain and neuropathy)., Disp: 90 capsule, Rfl: 2    glucose blood test strip, Check blood sugar 5 times daily as needed., Disp: 200 each, Rfl: 5    Insulin Infusion Pump (T:slim X2 Insulin Pump) device, Use., Disp: , Rfl:     Insulin Lispro (humaLOG) 100 UNIT/ML injection, Inject 2-7 Units under the skin into the appropriate area as directed 4 (Four) Times a Day Before Meals & at Bedtime., Disp: 10 mL, Rfl: 2    lactobacillus acidophilus (RISAQUAD) capsule capsule, Take 1 capsule by mouth Daily., Disp: 30 capsule, Rfl: 0    lidocaine (LIDODERM) 5 %, Place 1 patch on the skin as directed by provider Daily. No more than 1 patch per 24 hours., Disp: , Rfl:     losartan (COZAAR) 100 MG tablet, Take 1 tablet by mouth Daily., Disp: 90 tablet, Rfl: 1    melatonin 5 MG tablet tablet, Take 1 tablet by mouth At Night As Needed (sleep)., Disp: , Rfl:     mirtazapine (REMERON) 7.5 MG tablet, Take 1 tablet by mouth Every Night., Disp: 30 tablet, Rfl: 5    Multivitamin tablet tablet, Take 1 tablet by mouth Daily., Disp: 30 tablet, Rfl: 0    prochlorperazine (COMPAZINE) 5 MG tablet, Take 1  tablet by mouth Every 6 (Six) Hours As Needed for Nausea or Vomiting., Disp: , Rfl:     rivaroxaban (XARELTO) 15 MG tablet, Take 1 tablet by mouth Daily With Dinner, Disp: 42 tablet, Rfl: 2    terazosin (HYTRIN) 1 MG capsule, Take 1 capsule by mouth Every Night., Disp: 30 capsule, Rfl: 0    traMADol (ULTRAM) 50 MG tablet, Take 1 tablet by mouth 2 (Two) Times a Day As Needed for Moderate Pain., Disp: 60 tablet, Rfl: 2    vitamin D (ERGOCALCIFEROL) 1.25 MG (62827 UT) capsule capsule, Take 1 capsule by mouth 1 (One) Time Per Week., Disp: 4 capsule, Rfl: 2    ALLERGIES     Patient has no known allergies.    FAMILY HISTORY       Family History   Problem Relation Age of Onset    Anxiety disorder Other     Arthritis Other     ADD / ADHD Other     Heart disease Other         cardiac disorder    Depression Other     Diabetes Other     Hyperlipidemia Other     Hypertension Other     Lung cancer Other     Osteoporosis Other     Hypertension Brother     Diabetes Brother     Hyperlipidemia Mother     Hypertension Mother     Colon cancer Neg Hx           SOCIAL HISTORY       Social History     Socioeconomic History    Marital status:    Tobacco Use    Smoking status: Former     Packs/day: 0.25     Years: 30.00     Additional pack years: 0.00     Total pack years: 7.50     Types: Cigarettes     Quit date: 2019     Years since quittin.6    Smokeless tobacco: Never    Tobacco comments:     BC PL never smoker    Vaping Use    Vaping Use: Never used   Substance and Sexual Activity    Alcohol use: Yes     Alcohol/week: 1.0 standard drink of alcohol     Types: 1 Glasses of wine per week     Comment: social    Drug use: Not Currently     Frequency: 2.0 times per week     Types: Marijuana    Sexual activity: Not Currently     Comment: Single          PHYSICAL EXAM    (up to 7 for level 4, 8 or more for level 5)     Vitals:    24 2141 24 2145 24 2146 24 2151   BP:  (!) 204/97 (!) 204/97 (!) 224/128    Pulse:  89 87 87   Resp:       Temp: 97.8 °F (36.6 °C)      TempSrc: Oral      SpO2:  97% 98% 100%   Weight:       Height:           Physical Exam  General : Patient is awake, alert, answering questions appropriately, has obvious left-sided deficits, facial droop, slurred speech  HEENT: Pupils are equally round, EOMI, conjunctivae clear  Cardiac: Heart regular rate, rhythm, no murmurs, rubs, or gallops  Lungs: Lungs are clear to auscultation, there is no wheezing, rhonchi, or rales. There is no use of accessory muscles  Abdomen: Abdomen is soft, nontender, nondistended  Musculoskeletal: Generalized muscle weakness, initial examination patient is unable to hold her left upper extremity against gravity, left lower extremity is able to hold briefly for 2 seconds against gravity right side is 5 out of 5 strength, good  strength right upper extremity  Neuro: Patient is awake, answering questions appropriately, has obvious left-sided facial droop, some slurring of her speech is noted.  She has left upper extremity left lower extremity weakness on examination on initial presentation to the ED.  Seer examinations the patient is able to sit up, raise her left arm and left leg with some residual weakness but not flaccid paralysis.  Please refer to stroke navigator for full NIH score.  Dermatology: Skin is warm and dry      DIAGNOSTIC RESULTS     EKG:  All EKGs are interpreted by the Emergency Department Physician who either signs or Co-signs this chart in the absence of a cardiologist.    ECG 12 Lead ED Triage Standing Order; Acute Stroke (Onset <24 hrs)   Final Result   Test Reason : ED Triage Standing Order~   Blood Pressure :   */*   mmHG   Vent. Rate :  94 BPM     Atrial Rate :  94 BPM      P-R Int : 144 ms          QRS Dur :  76 ms       QT Int : 376 ms       P-R-T Axes :  67  52  78 degrees      QTc Int : 470 ms      Normal sinus rhythm   Possible Left atrial enlargement   Left ventricular hypertrophy    Nonspecific ST and T wave abnormality   Abnormal ECG   When compared with ECG of 07-SEP-2023 05:09,   No significant change was found   Confirmed by KASEY ROME MD ([id number]) on 2/5/2024 9:55:35 PM      Referred By: ANDRADE           Confirmed By: KASEY ROME MD          RADIOLOGY:     [x] Radiologist's Report Reviewed:  XR Chest 1 View   Final Result   Impression: No acute cardiopulmonary disease.      Electronically Signed: Souleymane Boone MD     2/5/2024 9:51 PM EST     Workstation ID: [id number]      CT Head Without Contrast Stroke Protocol   Final Result   Impression:      No evidence of acute intracranial abnormality.      Similar chronic findings, including advanced white matter change that is likely sequela of chronic small vessel ischemic disease.         Electronically Signed: Raymond Garcia MD     2/5/2024 8:52 PM EST     Workstation ID: [id number]      CT Angiogram Head w AI Analysis of LVO    (Results Pending)   CT CEREBRAL PERFUSION WITH & WITHOUT CONTRAST    (Results Pending)   CT Angiogram Neck    (Results Pending)       I ordered and independently reviewed the above noted radiographic studies.      I viewed images of CT head which showed no acute intracranial abnormality per my independent interpretation.    See radiologist's dictation for official interpretation.      ED BEDSIDE ULTRASOUND:   Performed by ED Physician - none    LABS:    I have reviewed and interpreted all of the currently available lab results from this visit (if applicable):  Results for orders placed or performed during the hospital encounter of 02/05/24   CBC Auto Differential    Specimen: Blood   Result Value Ref Range    WBC 7.63 3.40 - 10.80 10*3/mm3    RBC 3.82 3.77 - 5.28 10*6/mm3    Hemoglobin 11.6 (L) 12.0 - 15.9 g/dL    Hematocrit 34.5 34.0 - 46.6 %    MCV 90.3 79.0 - 97.0 fL    MCH 30.4 26.6 - 33.0 pg    MCHC 33.6 31.5 - 35.7 g/dL    RDW 13.7 12.3 - 15.4 %    RDW-SD 45.1 37.0 - 54.0 fl    MPV 10.6 6.0 - 12.0 fL    Platelets  334 140 - 450 10*3/mm3    Neutrophil % 57.8 42.7 - 76.0 %    Lymphocyte % 33.2 19.6 - 45.3 %    Monocyte % 6.8 5.0 - 12.0 %    Eosinophil % 1.4 0.3 - 6.2 %    Basophil % 0.4 0.0 - 1.5 %    Immature Grans % 0.4 0.0 - 0.5 %    Neutrophils, Absolute 4.41 1.70 - 7.00 10*3/mm3    Lymphocytes, Absolute 2.53 0.70 - 3.10 10*3/mm3    Monocytes, Absolute 0.52 0.10 - 0.90 10*3/mm3    Eosinophils, Absolute 0.11 0.00 - 0.40 10*3/mm3    Basophils, Absolute 0.03 0.00 - 0.20 10*3/mm3    Immature Grans, Absolute 0.03 0.00 - 0.05 10*3/mm3    nRBC 0.0 0.0 - 0.2 /100 WBC   POC CHEM 8    Specimen: Blood   Result Value Ref Range    Glucose 336 (H) 70 - 130 mg/dL    BUN 35 (H) 8 - 26 mg/dL    Creatinine 2.50 (H) 0.60 - 1.30 mg/dL    Sodium 140 138 - 146 mmol/L    POC Potassium 3.6 3.5 - 4.9 mmol/L    Chloride 100 98 - 109 mmol/L    Total CO2 29 24 - 29 mmol/L    Hemoglobin 11.2 (L) 12.0 - 17.0 g/dL    Hematocrit 33 (L) 38 - 51 %    Ionized Calcium 1.17 (L) 1.20 - 1.32 mmol/L    eGFR 20.9 (L) >60.0 mL/min/1.73   POCT Protime/INR    Specimen: Blood   Result Value Ref Range    Protime 13.6 12.8 - 15.2 seconds    INR 1.1 0.8 - 1.2   ECG 12 Lead ED Triage Standing Order; Acute Stroke (Onset <24 hrs)   Result Value Ref Range    QT Interval 376 ms    QTC Interval 470 ms        If labs were ordered, I independently reviewed the results and considered them in treating the patient.      EMERGENCY DEPARTMENT COURSE and DIFFERENTIAL DIAGNOSIS/MDM:   Vitals:  AS OF 21:59 EST    BP - (!) 224/128  HR - 87  TEMP - 97.8 °F (36.6 °C) (Oral)  O2 SATS - 100%      Orders placed during this visit:  Orders Placed This Encounter   Procedures    CT Head Without Contrast Stroke Protocol    CT Angiogram Head w AI Analysis of LVO    CT CEREBRAL PERFUSION WITH & WITHOUT CONTRAST    XR Chest 1 View    CT Angiogram Neck    [place] Draw    Single High Sensitivity Troponin T    aPTT    CBC Auto Differential    Heparin Anti-Xa    Comprehensive Metabolic Panel    NPO Diet  NPO Type: Strict NPO    Initiate Code Stroke    Perform NIH Stroke Scale    Measure Actual Weight    Head of Bed 30 Degrees or Less    Undress and Gown    Continuous Pulse Oximetry    Vital Signs    Neuro Checks    Notify MD for SBP < 80 or > 200    Notify Provider for SBP > 140 if Hemorrhagic Stroke    No Hypotonic Fluids    Nursing Dysphagia Screening (Complete Prior to Giving anything PO)    RN to Place Order SLP Consult (IF swallow screen failed) - Eval & Treat Choosing Reason of RN Dysphagia Screen Failed    Inpatient Neurology Consult Stroke    Oxygen Therapy- Nasal Cannula; Titrate 1-6 LPM Per SpO2; 90 - 95%    POC CHEM 8    POCT Protime/INR    ECG 12 Lead ED Triage Standing Order; Acute Stroke (Onset <24 hrs)    Insert Large-Bore Peripheral IV - Right AC Preferred    Inpatient Admission    ICU / CCU Bed Request ([facility] / [facility] ONLY)    CBC & Differential    Green Top (Gel)    Lavender Top    Gold Top - SST    Gray Top    Light Blue Top       All labs have been independently reviewed by me.  All radiology studies have been reviewed by me and the radiologist dictating the report.  All EKG's have been independently viewed and interpreted by me.      Discussion below represents my analysis of pertinent findings related to patient's condition, differential diagnosis, treatment plan and final disposition.    Differential diagnosis:  The differential diagnosis associated with the patient's presentation includes: CVA, TIA, stroke, seizure activity, psychogenic disorder    Additional sources  Discussed/ obtained information from independent historians:   [] Spouse  [] Parent  [x] Family member  [] Friend  [x] EMS   [] Other:    External (non-ED) record review:   [] Inpatient record:   [] Office record:   [] Outpatient record:   [] Prior Outpatient labs:   [] Prior Outpatient radiology:   [] Primary Care record:   [] Outside ED record:   [] Other:     Patient's care impacted by:   [] Diabetes  [] Hypertension  [] CHF  []  Hyperlipidemia  [] Coronary Artery Disease   [] COPD   [] Cancer   [] Tobacco Abuse   [] Substance Abuse    [] Other:     Care significantly affected by Social Determinants of Health (housing and economic circumstances, unemployment)    [] Yes     [x] No   If yes, Patient's care significantly limited by Social Determinants of Health including:   [] Inadequate housing   [] Low income   [] Alcoholism and drug addiction in family   [] Problems related to primary support group   [] Unemployment   [] Problems related to employment   [] Other Social Determinants of Health:       MEDICATIONS ADMINISTERED IN ED:  Medications   sodium chloride 0.9 % flush 10 mL (has no administration in time range)   midazolam (VERSED) injection 5 mg (5 mg Intramuscular Given 2/5/24 2111)   iopamidol (ISOVUE-370) 76 % injection 150 mL (115 mL Intravenous Given 2/5/24 2126)            63-year-old female presenting with sudden onset of left-sided facial droop, left-sided weakness.  She is immediately seen by myself and stroke navigator upon arrival in the CT scanner.  Initial CT head without acute intracranial abnormality.  The patient has a history of atrial fibrillation, she believes she has been taking her Xarelto deafly yesterday, possibly again today.  Will try to get in touch with the patient's son, daughter on the telephone were not successful at first.  Given that the patient had taken her Xarelto prior however not immediate TNK candidate.  Will try to clarify this, order factor Xa for further clarification.  On additional neurological checks that patient was moving her left arm and left leg, she got very anxious, started having a panic attack, stated that she want to leave AMA, she stated if she can get some anxiety medication we should be able to get our IV and additional imaging.  She was given IM Versed, images angiography and perfusion studies obtained.  There is concern for MCA occlusion on her angiogram.  Further discussion with  neurosurgeon, Dr. Schultz feels the next step would be Cath Lab intervention.  I did discuss with the patient's son over the telephone regarding the concern for larger vessel occlusion which would warrant intervention with neurosurgery he is comfortable with no open can try and help his mother understand the risks associated with this invasive procedure.  Case also discussed with intensivist, Dr. Sheikh for ICU admission post procedure.      PROCEDURES:  Procedures    CRITICAL CARE TIME    Total Critical Care time was 40 minutes, excluding separately reportable procedures.  Acute CVA, large vessel occlusion stroke with left sided deficits requiring multiple neurochecks, reevaluations and assessments, discussions with multiple consultants, neurosurgery, intensivist.  There was a high probability of clinically significant/life threatening deterioration in the patient's condition which required my urgent intervention.      FINAL IMPRESSION      1. Cerebrovascular accident (CVA) due to occlusion of right middle cerebral artery          DISPOSITION/PLAN     ED Disposition       ED Disposition   Decision to Admit    Condition   --    Comment   Level of Care: Critical Care [6]   Diagnosis: CVA (cerebral vascular accident) [510591]   Admitting Physician: NIKI SHEIKH [[id number]]   Certification: I Certify That Inpatient Hospital Services Are Medically Necessary For Greater Than 2 Midnights                 Comment: Please note this report has been produced using speech recognition software.      Inder Sanz DO  Attending Emergency Physician         Inder Sanz DO  02/05/24 [id number]

## 2024-02-06 NOTE — CONSULTS
Chart reviewed per stroke protocol. GCS 13. Not a candidate for the stroke/diabetes class as bmi less than 25. Not appropriate for education today. Will see as able

## 2024-02-06 NOTE — CONSULTS
Clinical Nutrition     Nutrition Support Assessment  Reason for Visit: Consult, Difficulty chewing/swallowing    Patient Name: Thelma Michael  YOB: 1960  MRN: [id number]  Date of Encounter: 02/06/24 09:46 EST  Admission date: 2/5/2024    NPO per initial SLP eval, plan discussed in MDR for insertion corepak/initiation EN if needed.    Pt with hx gastroparesis, will need postpyloric tube.     RD recommends the following for Nutrition Support via [place]:  Initiate continuous EN regimen of Vital 1.5 @ 25 ml/hr and advance by 10 ml/hr Q 8 hr to goal rate of 55 ml/hr, water flushes 30 ml q 4 hr. (Will adjust once IVF stopped)     At goal this regimen will provide: 1100 ml, 1650 kcal(103% est. needs), 74 g protein(103% est. needs), 6.6 g soluble fiber, and 847 ml FW in formula +150 ml flushes = 997 ml total water    Pt with hx malnutrition, unsure if meets criteria at this time. Will complete further assessment as feasible. Well known to clinical nutrition team with several admissions for gastroparesis and DKA.     Nutrition Assessment   Admission Diagnosis:  CVA (cerebral vascular accident) [I63.9]      Problem List:    CVA (cerebral vascular accident)    Diabetic peripheral neuropathy    Hyperlipidemia    Hypertension    Heart valve disease    Uncontrolled type 1 diabetes mellitus with hyperglycemia    Gastroparesis    PFO (patent foramen ovale)    Atrial fibrillation and flutter    Acute-on-CKD (chronic kidney disease) stage 3, GFR 30-59 ml/min    Hypertensive urgency        PMH:   She  has a past medical history of Acid reflux, Acute bronchitis, Cardiac murmur, Depression with anxiety (10/05/2020), Diabetes mellitus, Diabetes mellitus type I, Gastroesophageal reflux disease (05/13/2016), H/O echocardiogram (08/07/2012), History of nuclear stress test (08/22/2014), History of TIAs (07/15/2021), Hyperlipidemia, Hypertension, Impacted cerumen of both ears, Migraine, Migraines  (10/31/2019), Self-catheterizes urinary bladder, Sinusitis, Stroke, Tobacco abuse, Urticaria, and Vitamin D deficiency (05/13/2016).    PSH:  She  has a past surgical history that includes Dental surgery; Colonoscopy; Esophagogastroduodenoscopy (N/A, 06/30/2021); Colonoscopy (N/A, 07/27/2021); Esophagogastroduodenoscopy (N/A, 07/27/2021); Capsule Endoscopy (07/27/2021); endoscopy with jtube (N/A, 03/16/2022); Upper gastrointestinal endoscopy; and Gastric stimulator implant surgery (N/A).      Applicable Nutrition Concerns:   Skin:  Oral:  GI:hx gastroparesis ?gastric stimulator     Applicable Interval History:     (2/6) SLP initial eval -   SLP Swallowing Diagnosis: severe, oral dysphagia, suspected pharyngeal dysphagia; SLP Diet Recommendation: NPO    Reported/Observed/Food/Nutrition Related History:     Pt presents with significant stroke per Neurosurgery, s/p thrombectomy. MRI today with further interventions pending results. Pt well known to clinical nutrition team with several past admissions often for DKA and/or symptoms of gastroparesis. Hx malnutrition r/t gastroparesis. Pt has T1DM with insulin pump. Pt reported also with gastric stimulator when last seen, unsure if still has.   Pt discussed in MDR plan for corepak/EN if fails with SLP. At time of this note SLP has seen and made NPO per initial eval. Currently with very high BG. Lytes wnl. Bowels moving. Weight appears stable past year per EMR. Pt getting PICC inserted, no family at bedside at attempt visit. Will f/u for further assessment as feasible. NKFA.     Labs    Labs Reviewed: Yes     Results from last 7 days   Lab Units 02/06/24  0331 02/05/24 2124 02/05/24 2123   GLUCOSE mg/dL 549* 350*  --    BUN mg/dL 37* 35*  --    CREATININE mg/dL 1.64* 2.06* 2.50*   SODIUM mmol/L 142 138  --    CHLORIDE mmol/L 103 97*  --    POTASSIUM mmol/L 3.9 3.7  --    PHOSPHORUS mg/dL 3.4  --   --    MAGNESIUM mg/dL 1.9  --   --    ALT (SGPT) U/L  --  13  --   "      Results from last 7 days   Lab Units 02/06/24  0331 02/05/24 2124   ALBUMIN g/dL  --  4.0   CHOLESTEROL mg/dL 262*  --    TRIGLYCERIDES mg/dL 160*  --        Results from last 7 days   Lab Units 02/06/24  0526 02/06/24  0012 02/05/24 2123   GLUCOSE mg/dL 503* 461* 336*     Lab Results   Lab Value Date/Time    HGBA1C 9.00 (H) 02/05/2024 2124    HGBA1C 11.10 (H) 09/06/2023 0859                 Medications    Medications Reviewed: Yes  Pertinent: insulin, bowel regimen ordered  Infusion: cardene, NS .9 @ 100 ml/hr  PRN:     Intake/Ouptut 24 hrs (0701 - 0700)   I&O's Reviewed: Yes   Intake & Output (last day)         02/05 0701 02/06 0700 02/06 0701 02/07 0700    I.V. (mL/kg) 1542 (22.6)     Total Intake(mL/kg) 1542 (22.6)     Urine (mL/kg/hr) 2075     Stool 0     Total Output 2075     Net -533           Stool Unmeasured Occurrence 2 x             Anthropometrics     Height: Height: 172.7 cm (68\")  Last Filed Weight: Weight: 68.2 kg (150 lb 5.7 oz) (02/06/24 0600)  Method: Weight Method: Bed scale  BMI: BMI (Calculated): 22.9  BMI classification: Normal: 18.5-24.9kg/m2  IBW:      UBW: ~130-140 lb past year per EMR:   Weight       Weight (kg) Weight (lbs) Weight Method Visit Report   2/3/2023 59.058 kg  130 lb 3.2 oz  Standing scale     3/15/2023 63.05 kg  139 lb   --    4/30/2023 61.236 kg  135 lb  Stated     5/5/2023 61.236 kg  135 lb      6/26/2023 62.143 kg  137 lb   --    7/3/2023 63.957 kg  141 lb   --    8/10/2023 63.05 kg  139 lb   --    9/8/2023 60.601 kg  133 lb 9.6 oz  Standing scale     10/27/2023 60.328 kg  133 lb   --    11/9/2023 64.411 kg  142 lb   --    12/5/2023 65.828 kg  145 lb 2 oz   --    2/5/2024 65.772 kg  145 lb  Estimated     2/6/2024 65 kg  143 lb 4.8 oz  Bed scale      68.2 kg  150 lb 5.7 oz        Weight change: no verifiable significant changes    Nutrition Focused Physical Exam     Date: 2/6    Unable to perform exam due to: Pt unable to participate at time of visit    Needs " "Assessment   Date: 2/6    Height used:Height: 172.7 cm (68\")  Weights used: 65 kg (ABW at admission)      Estimated Calorie needs: ~1600 Kcal/day  Method:  Kcals/KG 25 = 1625  Method:  MSJ = 1239 X 1.3 = 1611    Estimated Protein needs: ~72 g PRO/day  Method: 1-1.2 g/Kg = 65-78 w/ CKD and current renal fx    Estimated Fluid needs: ~ ml/day   Per clinical status    Current Nutrition Prescription     PO: NPO Diet NPO Type: Strict NPO  Oral Nutrition Supplement:   Intake: N/A    Nutrition Diagnosis   Date: 2/6 Updated:   Problem Inadequate oral intake   Etiology CVA, dysphagia    Signs/Symptoms NPO per initial SLP evaluation   Status:     Date: 2/6  Updated:  Problem Altered GI function   Etiology Gastroparesis    Signs/Symptoms Chronic, with gastric stimulator   Status:     Goal:   General: Nutrition to support treatment/nutrition support if needed  PO: Advace diet as medically feasible/appropriate  EN/PN: Initiate EN if needed    Nutrition Intervention      Follow treatment progress, Care plan reviewed, Nutrition support rx prepared    Initiate EN if needed, once a post pyloric tube is confirmed     Monitoring/Evaluation:   Per protocol, I&O, Pertinent labs, Weight, GI status, Symptoms, POC/GOC, Swallow function      Kylah Gonzalez RD, [facility]  Time Spent: 30m    "

## 2024-02-06 NOTE — CONSULTS
Stroke Consult Note    Patient Name: Thelma Michael   MRN: [id number]  Age: 63 y.o.  Sex: female  : 1960    Primary Care Physician: Monet Howe APRN  Referring Physician:  Dr. Sanz    TIME STROKE TEAM CALLED:  EST     TIME PATIENT SEEN:  EST    Handedness: Right  Race:      Chief Complaint/Reason for Consultation: Left facial droop and left-sided weakness    HPI: Ms. Michael is a 63-year-old female with PMH of HTN, HLD, and DM, A-fib (on Xarelto), GERD and CKD.  She presents to [facility] ED with acute onset of left facial droop and left-sided weakness.  Patient's daughter reportedly heard Ms. Michael fall while in the bathroom and notified EMS.  Daughter reports patient was in her usual state of health while eating dinner at 7 PM and went to change her clothes in the bathroom at approximately 7:40 PM.  Patient was brought to [facility] ED for further evaluation and stroke workup.    Initial NIH 7.  Blood pressure 162/108.  On exam patient is able answer 1 question appropriately and follow one-step commands.  Right gaze preference that will cross midline.  Left lateral visual field deficit.  Left facial droop with receptive aphasia and mild dysarthria noted during conversation.  Patient does complain of mild headache with nausea.  Strength in all extremities 5/5.  Family reports patient does smoke marijuana and takes prescribed medications routinely.      Last Known Normal Date/Time: 2024 at 1940 EST     Review of Systems   Constitutional:  Negative for fever.   HENT:  Negative for trouble swallowing and voice change.    Eyes:  Positive for visual disturbance.   Respiratory:  Negative for shortness of breath.    Cardiovascular:  Negative for chest pain.   Gastrointestinal:  Positive for nausea. Negative for abdominal pain.   Neurological:  Positive for dizziness, facial asymmetry, speech difficulty, weakness and headaches. Negative for light-headedness and numbness.    Psychiatric/Behavioral:  Positive for confusion. The patient is nervous/anxious.       Past Medical History:   Diagnosis Date    Acid reflux     Acute bronchitis     Cardiac murmur     Depression with anxiety 10/05/2020    Diabetes mellitus     Diabetes mellitus type I     Gastroesophageal reflux disease 05/13/2016    H/O echocardiogram 08/07/2012    i. LVEF 65%.ii. Mild LVH.iii. Borderline evidence of atrial septal aneurysm.  No PFO.     History of nuclear stress test 08/22/2014    Negative for ischemia and scars; LVEF 77%.      History of TIAs 07/15/2021    Hyperlipidemia     Hypertension     Impacted cerumen of both ears     Migraine     Migraines 10/31/2019    Self-catheterizes urinary bladder     Sinusitis     Stroke     Tobacco abuse     quit 4 days ago.      Urticaria     Vitamin D deficiency 05/13/2016     Past Surgical History:   Procedure Laterality Date    CAPSULE ENDOSCOPY  07/27/2021    Procedure: PILLCAM DEPLOYMENT;  Surgeon: Mikael Worthy MD;  Location:  [facility];  Service: Gastroenterology;;    COLONOSCOPY      COLONOSCOPY N/A 07/27/2021    Procedure: COLONOSCOPY;  Surgeon: Mikael Worthy MD;  Location:  [facility];  Service: Gastroenterology;  Laterality: N/A;    DENTAL PROCEDURE      ENDOSCOPY N/A 06/30/2021    Procedure: ESOPHAGOGASTRODUODENOSCOPY;  Surgeon: Brunner, Mark I, MD;  Location:  [facility];  Service: Gastroenterology;  Laterality: N/A;    ENDOSCOPY N/A 07/27/2021    Procedure: ESOPHAGOGASTRODUODENOSCOPY;  Surgeon: Mikael Worthy MD;  Location:  [facility];  Service: Gastroenterology;  Laterality: N/A;    ENDOSCOPY WITH JTUBE N/A 03/16/2022    Procedure: ESOPHAGOGASTRODUODENOSCOPY WITH JEJUNAL TUBE INSERTION;  Surgeon: Thom Glover MD;  Location:  [facility];  Service: Gastroenterology;  Laterality: N/A;    GASTRIC STIMULATOR IMPLANT SURGERY N/A     For gastropareisis    UPPER GASTROINTESTINAL ENDOSCOPY       Family History   Problem  Relation Age of Onset    Anxiety disorder Other     Arthritis Other     ADD / ADHD Other     Heart disease Other         cardiac disorder    Depression Other     Diabetes Other     Hyperlipidemia Other     Hypertension Other     Lung cancer Other     Osteoporosis Other     Hypertension Brother     Diabetes Brother     Hyperlipidemia Mother     Hypertension Mother     Colon cancer Neg Hx      Social History     Socioeconomic History    Marital status:    Tobacco Use    Smoking status: Former     Packs/day: 0.25     Years: 30.00     Additional pack years: 0.00     Total pack years: 7.50     Types: Cigarettes     Quit date: 2019     Years since quittin.6    Smokeless tobacco: Never    Tobacco comments:     BC PL never smoker    Vaping Use    Vaping Use: Never used   Substance and Sexual Activity    Alcohol use: Yes     Alcohol/week: 1.0 standard drink of alcohol     Types: 1 Glasses of wine per week     Comment: social    Drug use: Not Currently     Frequency: 2.0 times per week     Types: Marijuana    Sexual activity: Not Currently     Comment: Single      No Known Allergies  Prior to Admission medications    Medication Sig Start Date End Date Taking? Authorizing Provider   acetaminophen (TYLENOL) 325 MG tablet Take 2 tablets by mouth Every 4 (Four) Hours As Needed for Mild Pain . 21   Marta Rodriguez PA-C   albuterol sulfate  (90 Base) MCG/ACT inhaler Inhale 2 puffs Every 4 (Four) Hours As Needed for Wheezing. 23   Jordan Millan APRN   amLODIPine (NORVASC) 10 MG tablet Take 1 tablet by mouth Daily. 23   Rajat Akers APRN   atorvastatin (Lipitor) 80 MG tablet Take 1 tablet by mouth Daily. 10/19/23   Monet Howe APRN   Blood Glucose Monitoring Suppl (FreeStyle Lite) w/Device kit 1 Device 3 (Three) Times a Day. 23   Monet Howe APRN   calcium carbonate (TUMS) 500 MG chewable tablet Chew 1,000 mg 3 (Three) Times a Day As Needed for  Indigestion or Heartburn. 5/6/21   Marta Rodriguez PA-C   carvedilol (COREG) 12.5 MG tablet Take 1 tablet by mouth 2 (Two) Times a Day With Meals. 9/18/23   Delia Triplett DO   Continuous Blood Gluc Transmit (Dexcom G6 Transmitter) misc 1 each by Other route Every 3 (Three) Months. 1/9/24   Gerson Ochoa MD   famotidine (PEPCID) 20 MG tablet Take 1 tablet by mouth Every Morning. 9/19/23   Delia Triplett DO   ferrous sulfate 325 (65 Fe) MG tablet Take with Vitamin C or Juice  Patient taking differently: Daily With Breakfast. Take with Vitamin C or Juice 7/11/23   Monet Howe APRN   gabapentin (NEURONTIN) 100 MG capsule Take 1 capsule by mouth 3 (Three) Times a Day As Needed (back pain and neuropathy). 10/19/23   Monet Howe APRN   glucose blood test strip Check blood sugar 5 times daily as needed. 6/26/23   Monet Howe APRN   Insulin Infusion Pump (T:slim X2 Insulin Pump) device Use.    Provider, MD Reddy   Insulin Lispro (humaLOG) 100 UNIT/ML injection Inject 2-7 Units under the skin into the appropriate area as directed 4 (Four) Times a Day Before Meals & at Bedtime. 1/9/24   Gerson Ochoa MD   lactobacillus acidophilus (RISAQUAD) capsule capsule Take 1 capsule by mouth Daily. 5/5/23   Rajat Akers APRN   lidocaine (LIDODERM) 5 % Place 1 patch on the skin as directed by provider Daily. No more than 1 patch per 24 hours. 1/25/24   Monet Howe APRN   losartan (COZAAR) 100 MG tablet Take 1 tablet by mouth Daily. 1/19/24   Monet Howe APRN   melatonin 5 MG tablet tablet Take 1 tablet by mouth At Night As Needed (sleep). 5/6/21   Marta Rodriguez PA-C   mirtazapine (REMERON) 7.5 MG tablet Take 1 tablet by mouth Every Night. 6/26/23   Monet Howe APRN   Multivitamin tablet tablet Take 1 tablet by mouth Daily. 8/16/23   Monet Howe APRN   prochlorperazine (COMPAZINE) 5 MG  tablet Take 1 tablet by mouth Every 6 (Six) Hours As Needed for Nausea or Vomiting. 9/18/23   Delia Triplett DO   rivaroxaban (XARELTO) 15 MG tablet Take 1 tablet by mouth Daily With Dinner 6/13/23   Krishna Jean MD   terazosin (HYTRIN) 1 MG capsule Take 1 capsule by mouth Every Night. 8/16/23   Monet Howe APRN   traMADol (ULTRAM) 50 MG tablet Take 1 tablet by mouth 2 (Two) Times a Day As Needed for Moderate Pain. 11/9/23   Monet Howe APRN   vitamin D (ERGOCALCIFEROL) 1.25 MG (51117 UT) capsule capsule Take 1 capsule by mouth 1 (One) Time Per Week. 7/11/23   Monet Howe APRN   Insulin Glargine (BASAGLAR KWIKPEN) 100 UNIT/ML injection pen Inject 10 Units under the skin into the appropriate area as directed Every Night. 3/19/22 3/19/22  Kath Rosado MD            Neurological Exam  Mental Status  Awake and alert. Oriented only to person. Mild dysarthria present. Receptive aphasia present.    Cranial Nerves  CN II: Right visual acuity: Normal. Left monocular defect.  CN III, IV, VI: Extraocular movements intact bilaterally. Pupils equal round and reactive to light bilaterally.  CN V: Facial sensation is normal.  CN VII:  Left: There is central facial weakness.  CN VIII: Hearing intact.  CN XI: Shoulder shrug strength is normal.  CN XII: Tongue midline without atrophy or fasciculations.    Motor  Normal muscle bulk throughout. Normal muscle tone. Strength is 5/5 in all four extremities except as noted.  Left hand  weaker than right hand .    Sensory  Light touch is normal in upper and lower extremities.     Coordination    Unable to assess.    Gait    Unable to assess.      Physical Exam  Constitutional:       General: She is awake. She is not in acute distress.     Appearance: Normal appearance.   HENT:      Head: Normocephalic and atraumatic.      Nose: Nose normal.      Mouth/Throat:      Mouth: Mucous membranes are dry.   Eyes:      Extraocular  Movements: Extraocular movements intact.      Pupils: Pupils are equal, round, and reactive to light.   Cardiovascular:      Pulses: Normal pulses.   Pulmonary:      Effort: Pulmonary effort is normal.   Abdominal:      General: Abdomen is flat.   Musculoskeletal:         General: Normal range of motion.      Cervical back: Normal range of motion.   Skin:     General: Skin is warm and dry.   Neurological:      Mental Status: She is alert. She is disoriented.      Cranial Nerves: Cranial nerve deficit and dysarthria present.      Sensory: No sensory deficit.      Motor: Weakness present.   Psychiatric:         Mood and Affect: Mood normal.         Behavior: Behavior normal.         Acute Stroke Data    Thrombolytic Inclusion / Exclusion Criteria    Time: 21:12 EST  Person Administering Scale: HOWIE Barnett    Inclusion Criteria  [x]   18 years of age or greater   [x]   Onset of symptoms < 4.5 hours before beginning treatment (stroke onset = time patient was last seen well or without symptoms).   []   Diagnosis of acute ischemic stroke causing measurable disabling deficit (Complete Hemianopia, Any Aphasia, Visual or Sensory Extinction, Any weakness limiting sustained effort against gravity)   []   Any remaining deficit considered potentially disabling in view of patient and practitioner   Exclusion criteria (Do not proceed with Alteplase if any are checked under exclusion criteria)  []   Onset unknown or GREATER than 4.5 hours   []   ICH on CT/MRI   []   CT demonstrates hypodensity representing acute or subacute infarct   []   Significant head trauma or prior stroke in the previous 3 months   []   Symptoms suggestive of subarachnoid hemorrhage   []   History of un-ruptured intracranial aneurysm GREATER than 10 mm   []   Recent intracranial or intraspinal surgery within the last 3 months   []   Arterial puncture at a non-compressible site in the previous 7 days   []   Active internal bleeding   []   Acute  bleeding tendency   []   Platelet count LESS than 100,000 for known hematological diseases such as leukemia, thrombocytopenia or chronic cirrhosis   []   Current use of anticoagulant with INR GREATER than 1.7 or PT GREATER than 15 seconds, aPTT GREATER than 40 seconds   []   Heparin received within 48 hours, resulting in abnormally elevated aPTT GREATER than upper limit of normal   [x]   Current use of direct thrombin inhibitors or direct factor Xa inhibitors in the past 48 hours   []   Elevated blood pressure refractory to treatment (systolic GREATER than 185 mm/Hg or diastolic  GREATER than 110 mm/Hg   []   Suspected infective endocarditis and aortic arch dissection   []   Current use of therapeutic treatment dose of low-molecular-weight heparin (LMWH) within the previous 24 hours   []   Structural GI malignancy or bleed   Relative exclusion for all patients  []   Only minor non-disabling symptoms   []   Pregnancy   []   Seizure at onset with postictal residual neurological impairments   []   Major surgery or previous trauma within past 14 days   []   History of previous spontaneous ICH, intracranial neoplasm, or AV malformation   []   Postpartum (within previous 14 days)   []   Recent GI or urinary tract hemorrhage (within previous 21 days)   []   Recent acute MI (within previous 3 months)   []   History of un-ruptured intracranial aneurysm LESS than 10 mm   []   History of ruptured intracranial aneurysm   []   Blood glucose LESS than 50 mg/dL (2.7 mmol/L)   []   Dural puncture within the last 7 days   []   Known GREATER than 10 cerebral microbleeds   Additional exclusions for patients with symptoms onset between 3 and 4.5 hours.  []   Age > 80.   []   On any anticoagulants regardless of INR  >>> Warfarin (Coumadin), Heparin, Enoxaparin (Lovenox), fondaparinux (Arixtra), bivalirudin (Angiomax), Argatroban, dabigatran (Pradaxa), rivaroxaban (Xarelto), or apixaban (Eliquis)   []   Severe stroke (NIHSS > 25).   []    History of BOTH diabetes and previous ischemic stroke.   []   The risks and benefits have been discussed with the patient or family related to the administration of IV thrombolytic therapy for stroke symptoms.   []   I have discussed and reviewed the patient's case and imaging with the attending prior to IV thrombolytic therapy.   NA Time IV thrombolytic administered       Hospital Meds:  Scheduled- midazolam, 5 mg, Intramuscular, Once      Infusions-     PRNs-   sodium chloride    Functional Status Prior to Current Stroke/Dante Score:   MODIFIED DANTE SCALE (to be assessed for each patient having history of stroke) []Stroke history but not assessed  []0: No symptoms at all  []1: No significant disability despite symptoms  [x]2: Slight disability  []3: Moderate disability  []4: Moderately severe disability  []5: Severe disability  []6: Death        NIH Stroke Scale  Time: 21:12 EST  Person Administering Scale: HOWIE Barnett  Interval: baseline  1a. Level of Consciousness: 0-->Alert, keenly responsive  1b. LOC Questions: 1-->Answers one question correctly  1c. LOC Commands: 0-->Performs both tasks correctly  2. Best Gaze: 1-->Partial gaze palsy, gaze is abnormal in one or both eyes, but forced deviation or total gaze paresis is not present  3. Visual: 2-->Complete hemianopia  4. Facial Palsy: 1-->Minor paralysis (flattened nasolabial fold, asymmetry on smiling)  5a. Motor Arm, Left: 0-->No drift, limb holds 90 (or 45) degrees for full 10 secs  5b. Motor Arm, Right: 0-->No drift, limb holds 90 (or 45) degrees for full 10 secs  6a. Motor Leg, Left: 0-->No drift, leg holds 30 degree position for full 5 secs  6b. Motor Leg, Right: 0-->No drift, leg holds 30 degree position for full 5 secs  7. Limb Ataxia: 0-->Absent  8. Sensory: 0-->Normal, no sensory loss  9. Best Language: 1-->Mild-to-moderate aphasia, some obvious loss of fluency or facility of comprehension, without significant limitation on ideas expressed  or form of expression. Reduction of speech and/or comprehension, however, makes conversation. . . (see row details)  10. Dysarthria: 1-->Mild-to-moderate dysarthria, patient slurs at least some words and, at worst, can be understood with some difficulty  11. Extinction and Inattention (formerly Neglect): 0-->No abnormality    Total (NIH Stroke Scale): 7     Results Reviewed:  I have personally reviewed current lab, radiology, and data and agree with results.    Results for orders placed during the hospital encounter of 12/06/22    Adult Transthoracic Echo Complete W/ Cont if Necessary Per Protocol    Interpretation Summary    Left ventricular ejection fraction appears to be greater than 70%.    Left ventricular wall thickness is consistent with moderate concentric hypertrophy.    Left ventricular diastolic function is consistent with (grade I) impaired relaxation.    Estimated right ventricular systolic pressure from tricuspid regurgitation is mildly elevated (35-45 mmHg). Calculated right ventricular systolic pressure from tricuspid regurgitation is 39 mmHg.     WBC   Date Value Ref Range Status   02/05/2024 7.63 3.40 - 10.80 10*3/mm3 Final     RBC   Date Value Ref Range Status   02/05/2024 3.82 3.77 - 5.28 10*6/mm3 Final     Hemoglobin   Date Value Ref Range Status   02/05/2024 11.6 (L) 12.0 - 15.9 g/dL Final   02/05/2024 11.2 (L) 12.0 - 17.0 g/dL Final     Comment:     Serial Number: [id number]:  [id number]     Hematocrit   Date Value Ref Range Status   02/05/2024 34.5 34.0 - 46.6 % Final   02/05/2024 33 (L) 38 - 51 % Final     MCV   Date Value Ref Range Status   02/05/2024 90.3 79.0 - 97.0 fL Final     MCH   Date Value Ref Range Status   02/05/2024 30.4 26.6 - 33.0 pg Final     MCHC   Date Value Ref Range Status   02/05/2024 33.6 31.5 - 35.7 g/dL Final     RDW   Date Value Ref Range Status   02/05/2024 13.7 12.3 - 15.4 % Final     RDW-SD   Date Value Ref Range Status   02/05/2024 45.1 37.0 - 54.0 fl Final      MPV   Date Value Ref Range Status   02/05/2024 10.6 6.0 - 12.0 fL Final     Platelets   Date Value Ref Range Status   02/05/2024 334 140 - 450 10*3/mm3 Final     Neutrophil %   Date Value Ref Range Status   02/05/2024 57.8 42.7 - 76.0 % Final     Lymphocyte %   Date Value Ref Range Status   02/05/2024 33.2 19.6 - 45.3 % Final     Monocyte %   Date Value Ref Range Status   02/05/2024 6.8 5.0 - 12.0 % Final     Eosinophil %   Date Value Ref Range Status   02/05/2024 1.4 0.3 - 6.2 % Final     Basophil %   Date Value Ref Range Status   02/05/2024 0.4 0.0 - 1.5 % Final     Immature Grans %   Date Value Ref Range Status   02/05/2024 0.4 0.0 - 0.5 % Final     Neutrophils, Absolute   Date Value Ref Range Status   02/05/2024 4.41 1.70 - 7.00 10*3/mm3 Final     Lymphocytes, Absolute   Date Value Ref Range Status   02/05/2024 2.53 0.70 - 3.10 10*3/mm3 Final     Monocytes, Absolute   Date Value Ref Range Status   02/05/2024 0.52 0.10 - 0.90 10*3/mm3 Final     Eosinophils, Absolute   Date Value Ref Range Status   02/05/2024 0.11 0.00 - 0.40 10*3/mm3 Final     Basophils, Absolute   Date Value Ref Range Status   02/05/2024 0.03 0.00 - 0.20 10*3/mm3 Final     Immature Grans, Absolute   Date Value Ref Range Status   02/05/2024 0.03 0.00 - 0.05 10*3/mm3 Final     nRBC   Date Value Ref Range Status   02/05/2024 0.0 0.0 - 0.2 /100 WBC Final      Lab Results   Component Value Date    GLUCOSE 350 (H) 02/05/2024    BUN 35 (H) 02/05/2024    CREATININE 2.06 (H) 02/05/2024    EGFR 26.3 (L) 02/05/2024    BCR 17.0 02/05/2024    K 3.7 02/05/2024    CO2 26.0 02/05/2024    CALCIUM 8.8 02/05/2024    PROTENTOTREF 7.6 01/06/2015    ALBUMIN 4.0 02/05/2024    BILITOT 0.3 02/05/2024    AST 13 02/05/2024    ALT 13 02/05/2024    CT Angiogram Head w AI Analysis of LVO    Result Date: 2/5/2024  Impression: Occlusion of the proximal M2 segment of the right MCA. No evidence of core infarct with ischemic penumbra present within the right MCA territory,  however unable to quantify given background of generalized hypoperfusion. Findings discussed with Gautam of the stroke team at 9:34 p.m. on 2/5/2024. Electronically Signed: Raymond Garcia MD  2/5/2024 9:56 PM EST  Workstation ID: [id number]    CT CEREBRAL PERFUSION WITH & WITHOUT CONTRAST    Result Date: 2/5/2024  Impression: Occlusion of the proximal M2 segment of the right MCA. No evidence of core infarct with ischemic penumbra present within the right MCA territory, however unable to quantify given background of generalized hypoperfusion. Findings discussed with Gautam of the stroke team at 9:34 p.m. on 2/5/2024. Electronically Signed: Raymond Garcia MD  2/5/2024 9:56 PM EST  Workstation ID: [id number]    CT Angiogram Neck    Result Date: 2/5/2024  Impression: Occlusion of the proximal M2 segment of the right MCA. No evidence of core infarct with ischemic penumbra present within the right MCA territory, however unable to quantify given background of generalized hypoperfusion. Findings discussed with Gautam of the stroke team at 9:34 p.m. on 2/5/2024. Electronically Signed: Raymond Garcia MD  2/5/2024 9:56 PM EST  Workstation ID: [id number]    XR Chest 1 View    Result Date: 2/5/2024  Impression: No acute cardiopulmonary disease. Electronically Signed: Souleymane Boone MD  2/5/2024 9:51 PM EST  Workstation ID: [id number]    CT Head Without Contrast Stroke Protocol    Result Date: 2/5/2024  Impression: No evidence of acute intracranial abnormality. Similar chronic findings, including advanced white matter change that is likely sequela of chronic small vessel ischemic disease. Electronically Signed: Raymond Garcia MD  2/5/2024 8:52 PM EST  Workstation ID: [id number]    Assessment/Plan:  Ms. Michael is a 63-year-old female with PMH of HTN, HLD, and DM, A-fib (on Xarelto), GERD and CKD.  She presents to [facility] ED with acute onset of left facial droop and left-sided weakness.  Patient's daughter reportedly heard Ms. Michael fall while in the  bathroom and notified EMS.  Daughter reports patient was in her usual state of health while eating dinner at 7 PM and went to change her clothes in the bathroom at approximately 7:40 PM.  Patient was brought to [facility] ED for further evaluation and stroke workup.  Patient is not a candidate for IV thrombin therapy due to consistent Xarelto use.  Patient was deemed a candidate for endovascular therapy due to LVO on CT scans.    Antiplatelet PTA: None  Anticoagulant PTA: Xarelto        AIS right MCA M2 branch, s/p mechanical thrombectomy with Tici 3 flow  Initiate TIA/CVA without IV thrombotic therapy order set  Initiate postprocedural order set  N.p.o. until bedside dysphagia screening complete by RN  Strict bedrest  Aspirin 81 mg p.o. daily  Atorvastatin 80 mg p.o. nightly  Rounding team will evaluate stroke burden and OAC in a.m  MRI brain without contrast routine  TTE routine  Lipid panel routine  Blood pressure goals, SBP less than 140  Cardene drip for blood pressure management  Diabetes educator to see if appropriate  PT/OT/SLP eval and treat  Case management to follow      Plan of care discussed with Dr. Schultz, Dr. Sanz, daughter and son over the phone.  Stroke neurology will continue to follow.  Thank you for this consult.  Call with any questions or concerns.    Gautam Rico, APRN  February 5, 2024  21:12 EST

## 2024-02-06 NOTE — CONSULTS
NEUROSURGERY CONSULTATION    Referring Provider: [facility] stroke team    Patient Care Team:  Monet Howe APRN as PCP - General (Nurse Practitioner)  Markus Stahl MD as Consulting Physician (Gastroenterology)  Magali Pandey APRN as Nurse Practitioner (Gastroenterology)  Ludwig Mitchell MD as Consulting Physician (Cardiology)  Clara Rojas APRN as Nurse Practitioner (Nurse Practitioner)    Chief Complaint: MCA syndrome status post thrombectomy    History of Present Illness:   I saw a 63-year-old female neuroendovascular consultation who arrived last night.  She presented with a history of multiple stroke risk factors diabetes and uncontrolled hypertension.  She underwent a successful mechanical thrombectomy as a courtesy by one of my partners Dr. Coronado and this note reflects full consultative services of the endovascular team from last night    Overnight she has been quiet    Reported TICI 3 flow was obtained    She is noncommunicative past medical history was performed by secondary caregiver for her daughter      Review of Systems:  Musculoskeletal and Neurological systems were reviewed and are negative except for:  Musculoskeletal: positive for See HPI.  Neurological: positive for See HPI.    History:  Past Medical History:   Diagnosis Date    Acid reflux     Acute bronchitis     Cardiac murmur     Depression with anxiety 10/05/2020    Diabetes mellitus     Diabetes mellitus type I     Gastroesophageal reflux disease 05/13/2016    H/O echocardiogram 08/07/2012    i. LVEF 65%.ii. Mild LVH.iii. Borderline evidence of atrial septal aneurysm.  No PFO.     History of nuclear stress test 08/22/2014    Negative for ischemia and scars; LVEF 77%.      History of TIAs 07/15/2021    Hyperlipidemia     Hypertension     Impacted cerumen of both ears     Migraine     Migraines 10/31/2019    Self-catheterizes urinary bladder     Sinusitis     Stroke     Tobacco abuse     quit 4 days ago.       Urticaria     Vitamin D deficiency 2016   ,   Past Surgical History:   Procedure Laterality Date    CAPSULE ENDOSCOPY  2021    Procedure: PILLCAM DEPLOYMENT;  Surgeon: Mikael Worthy MD;  Location:  [facility];  Service: Gastroenterology;;    COLONOSCOPY      COLONOSCOPY N/A 2021    Procedure: COLONOSCOPY;  Surgeon: Mikael Worthy MD;  Location:  [facility];  Service: Gastroenterology;  Laterality: N/A;    DENTAL PROCEDURE      ENDOSCOPY N/A 2021    Procedure: ESOPHAGOGASTRODUODENOSCOPY;  Surgeon: Brunner, Mark I, MD;  Location:  [facility];  Service: Gastroenterology;  Laterality: N/A;    ENDOSCOPY N/A 2021    Procedure: ESOPHAGOGASTRODUODENOSCOPY;  Surgeon: Mikael Worthy MD;  Location:  [facility];  Service: Gastroenterology;  Laterality: N/A;    ENDOSCOPY WITH JTUBE N/A 2022    Procedure: ESOPHAGOGASTRODUODENOSCOPY WITH JEJUNAL TUBE INSERTION;  Surgeon: Thom Glover MD;  Location:  [facility];  Service: Gastroenterology;  Laterality: N/A;    GASTRIC STIMULATOR IMPLANT SURGERY N/A     For gastropareisis    UPPER GASTROINTESTINAL ENDOSCOPY     ,   Family History   Problem Relation Age of Onset    Anxiety disorder Other     Arthritis Other     ADD / ADHD Other     Heart disease Other         cardiac disorder    Depression Other     Diabetes Other     Hyperlipidemia Other     Hypertension Other     Lung cancer Other     Osteoporosis Other     Hypertension Brother     Diabetes Brother     Hyperlipidemia Mother     Hypertension Mother     Colon cancer Neg Hx    ,   Social History     Tobacco Use    Smoking status: Former     Packs/day: 0.25     Years: 30.00     Additional pack years: 0.00     Total pack years: 7.50     Types: Cigarettes     Quit date: 2019     Years since quittin.6    Smokeless tobacco: Never    Tobacco comments:     BC PL never smoker    Vaping Use    Vaping Use: Never used   Substance Use Topics    Alcohol  use: Yes     Alcohol/week: 1.0 standard drink of alcohol     Types: 1 Glasses of wine per week     Comment: social    Drug use: Not Currently     Frequency: 2.0 times per week     Types: Marijuana   ,   Medications Prior to Admission   Medication Sig Dispense Refill Last Dose    acetaminophen (TYLENOL) 325 MG tablet Take 2 tablets by mouth Every 4 (Four) Hours As Needed for Mild Pain .       albuterol sulfate  (90 Base) MCG/ACT inhaler Inhale 2 puffs Every 4 (Four) Hours As Needed for Wheezing. 18 g 5     amLODIPine (NORVASC) 10 MG tablet Take 1 tablet by mouth Daily. 30 tablet 0     atorvastatin (Lipitor) 80 MG tablet Take 1 tablet by mouth Daily. 90 tablet 1     Blood Glucose Monitoring Suppl (FreeStyle Lite) w/Device kit 1 Device 3 (Three) Times a Day. 1 kit 0     calcium carbonate (TUMS) 500 MG chewable tablet Chew 1,000 mg 3 (Three) Times a Day As Needed for Indigestion or Heartburn.       carvedilol (COREG) 12.5 MG tablet Take 1 tablet by mouth 2 (Two) Times a Day With Meals.       Continuous Blood Gluc Transmit (Dexcom G6 Transmitter) misc 1 each by Other route Every 3 (Three) Months. 1 each 3     famotidine (PEPCID) 20 MG tablet Take 1 tablet by mouth Every Morning.       ferrous sulfate 325 (65 Fe) MG tablet Take with Vitamin C or Juice (Patient taking differently: Daily With Breakfast. Take with Vitamin C or Juice) 30 tablet 0     gabapentin (NEURONTIN) 100 MG capsule Take 1 capsule by mouth 3 (Three) Times a Day As Needed (back pain and neuropathy). 90 capsule 2     glucose blood test strip Check blood sugar 5 times daily as needed. 200 each 5     Insulin Infusion Pump (T:slim X2 Insulin Pump) device Use.       Insulin Lispro (humaLOG) 100 UNIT/ML injection Inject 2-7 Units under the skin into the appropriate area as directed 4 (Four) Times a Day Before Meals & at Bedtime. 10 mL 2     lactobacillus acidophilus (RISAQUAD) capsule capsule Take 1 capsule by mouth Daily. 30 capsule 0     lidocaine  "(LIDODERM) 5 % Place 1 patch on the skin as directed by provider Daily. No more than 1 patch per 24 hours.       losartan (COZAAR) 100 MG tablet Take 1 tablet by mouth Daily. 90 tablet 1     melatonin 5 MG tablet tablet Take 1 tablet by mouth At Night As Needed (sleep).       mirtazapine (REMERON) 7.5 MG tablet Take 1 tablet by mouth Every Night. 30 tablet 5     Multivitamin tablet tablet Take 1 tablet by mouth Daily. 30 tablet 0     prochlorperazine (COMPAZINE) 5 MG tablet Take 1 tablet by mouth Every 6 (Six) Hours As Needed for Nausea or Vomiting.       rivaroxaban (XARELTO) 15 MG tablet Take 1 tablet by mouth Daily With Dinner 42 tablet 2     terazosin (HYTRIN) 1 MG capsule Take 1 capsule by mouth Every Night. 30 capsule 0     traMADol (ULTRAM) 50 MG tablet Take 1 tablet by mouth 2 (Two) Times a Day As Needed for Moderate Pain. 60 tablet 2     vitamin D (ERGOCALCIFEROL) 1.25 MG (49505 UT) capsule capsule Take 1 capsule by mouth 1 (One) Time Per Week. 4 capsule 2     Allergies:  Patient has no known allergies.      Physical Exam:  Vital Signs: Blood pressure 130/54, pulse 90, temperature 98.1 °F (36.7 °C), temperature source Axillary, resp. rate 18, height 172.7 cm (68\"), weight 68.2 kg (150 lb 5.7 oz), SpO2 96%, not currently breastfeeding.  The patient is somnolent    She has right-sided gaze neglect    Left-sided facial palsy noted    Pupils symmetric    She has left-sided dense hemiplegia consistent with an MCA syndrome with weak withdrawal    Groins okay    Data Review:  CTAs were all reviewed both old and new had a right MCA stroke        Diagnosis:  1.  Complex history MR S2 patient with poorly controlled diabetes prior strokes and likely intracranial atherosclerosis or cardioembolic stroke difficult to ascertain    Status post endovascular therapy by my partner Dr. Coronado with excellent angiographic result    2.  Diabetes multiple stroke risk factors underlying functional decline  Treatment " Recommendations:    I discussed the plan with the family unfortunately she has had a significant stroke plan will be MRI brain for prognostication.  Further recommendations for dual antiplatelet therapy etc. will be made based on the MRI findings we will also add an MRA with that to document further changes of intracranial atherosclerosis should that be the pathophysiology    5 pain at a very concerning picture given her age and current stroke findings the MRI will elucidate her rehab potential    Will monitor      Rajesh Schultz MD  02/06/24  10:01 EST

## 2024-02-07 PROBLEM — J69.0 ASPIRATION PNEUMONIA OF RIGHT LOWER LOBE DUE TO VOMIT: Status: ACTIVE | Noted: 2024-01-01

## 2024-02-07 PROBLEM — I61.2 NONTRAUMATIC HEMORRHAGE OF RIGHT CEREBRAL HEMISPHERE: Status: ACTIVE | Noted: 2024-01-01

## 2024-02-07 PROBLEM — J96.01 ACUTE RESPIRATORY FAILURE WITH HYPOXIA: Status: ACTIVE | Noted: 2024-01-01

## 2024-02-07 PROBLEM — I16.0 HYPERTENSIVE URGENCY: Status: RESOLVED | Noted: 2023-01-01 | Resolved: 2024-01-01

## 2024-02-07 NOTE — PROGRESS NOTES
Clinical Nutrition     Nutrition Support Assessment  Reason for Visit: Follow-up protocol, EN    Patient Name: Thelma Michael  YOB: 1960  MRN: [id number]  Date of Encounter: 02/07/24 11:22 EST  Admission date: 2/5/2024    Events noted. Pt now intubated. Discussed in MDR plan for EN. Emesis and loose bowels overnight. Pt with hx gastroparesis with gastric stimulator. Tube only able to be advanced to tip of pylorus or duodenal bulb. Will advance slowly, monitor tolerance closely. If unable to tolerate may need tube advanced, could also try Reglan. Will give minimal water while on IVF and Cardene.     RD recommends the following for Nutrition Support via [place]:  Initiate continuous EN regimen of Vital 1.5 @ 15 ml/hr and advance by 10 ml/hr Q 8 hr to goal rate of 45 ml/hr, water flushes 30 ml q 4 hr. (Will adjust once IVF stopped)    Rx will supply:   Goal Volume 900 mL/day     Flush Volume 150 mL/day     Energy 1350 Kcal/day 84 % Est Need   Protein 61 g/day 85 % Est Need   Fiber 5 g/day     Water in   mL     Total Water 834 mL     Meet DRI No      *+ ~215 kcal from propofol = 1565 (98% est. needs)  *plan to advance to better meet needs as propofol weaned. If unable to wean, will add prosource once tolerance established.     Nutrition Assessment   Admission Diagnosis:  CVA (cerebral vascular accident) [I63.9]      Problem List:    Right MCA CVA    Diabetic peripheral neuropathy    Hyperlipidemia    Hypertension    Heart valve disease    Uncontrolled type 1 diabetes mellitus with hyperglycemia    Gastroparesis    PFO (patent foramen ovale)    Atrial fibrillation and flutter    Acute-on-CKD (chronic kidney disease) stage 3, GFR 30-59 ml/min    Nontraumatic hemorrhage of right cerebral hemisphere    Acute respiratory failure with hypoxia    Aspiration pneumonia of right lower lobe due to vomit        PMH:   She  has a past medical history of Acid reflux, Acute bronchitis, Cardiac  murmur, Depression with anxiety (10/05/2020), Diabetes mellitus, Diabetes mellitus type I, Gastroesophageal reflux disease (05/13/2016), H/O echocardiogram (08/07/2012), History of nuclear stress test (08/22/2014), History of TIAs (07/15/2021), Hyperlipidemia, Hypertension, Impacted cerumen of both ears, Migraine, Migraines (10/31/2019), Self-catheterizes urinary bladder, Sinusitis, Stroke, Tobacco abuse, Urticaria, and Vitamin D deficiency (05/13/2016).    PSH:  She  has a past surgical history that includes Dental surgery; Colonoscopy; Esophagogastroduodenoscopy (N/A, 06/30/2021); Colonoscopy (N/A, 07/27/2021); Esophagogastroduodenoscopy (N/A, 07/27/2021); Capsule Endoscopy (07/27/2021); endoscopy with jtube (N/A, 03/16/2022); Upper gastrointestinal endoscopy; and Gastric stimulator implant surgery (N/A).      Applicable Nutrition Concerns:   Skin:  Oral:  GI:hx gastroparesis with gastric stimulator     Applicable Interval History:     (2/6) SLP initial eval -   SLP Swallowing Diagnosis: severe, oral dysphagia, suspected pharyngeal dysphagia; SLP Diet Recommendation: NPO    Reported/Observed/Food/Nutrition Related History:     2/7) Pt with decline overnight and was intubated. Prior to this had emesis X3 and loose stools X4. Tube placed but only able to be advanced to tip of pylorus or duodenal bulb. No bowel dilation seen on KUB, gastric stimulator is noted. Started on propofol, currently providing ~215 kcal. No pressors. Continues on Cardene and insulin drips, and NS.    2/6) Pt presents with significant stroke per Neurosurgery, s/p thrombectomy. MRI today with further interventions pending results. Pt well known to clinical nutrition team with several past admissions often for DKA and/or symptoms of gastroparesis. Hx malnutrition r/t gastroparesis. Pt has T1DM with insulin pump. Pt reported also with gastric stimulator when last seen, unsure if still has.   Pt discussed in MDR plan for corepak/EN if fails with  "SLP. At time of this note SLP has seen and made NPO per initial eval. Currently with very high BG. Lytes wnl. Bowels moving. Weight appears stable past year per EMR. Pt getting PICC inserted, no family at bedside at attempt visit. Will f/u for further assessment as feasible. NKFA.     Labs    Labs Reviewed: Yes     Results from last 7 days   Lab Units 02/06/24  0331 02/05/24 2124 02/05/24 2123   GLUCOSE mg/dL 549* 350*  --    BUN mg/dL 37* 35*  --    CREATININE mg/dL 1.64* 2.06* 2.50*   SODIUM mmol/L 142 138  --    CHLORIDE mmol/L 103 97*  --    POTASSIUM mmol/L 3.9 3.7  --    PHOSPHORUS mg/dL 3.4  --   --    MAGNESIUM mg/dL 1.9  --   --    ALT (SGPT) U/L  --  13  --        Results from last 7 days   Lab Units 02/06/24 0331 02/05/24 2124   ALBUMIN g/dL  --  4.0   CHOLESTEROL mg/dL 262*  --    TRIGLYCERIDES mg/dL 160*  --        Results from last 7 days   Lab Units 02/07/24  1109 02/07/24  0829 02/07/24  0732 02/07/24  0630 02/07/24  0508 02/07/24  0355   GLUCOSE mg/dL 128 141* 128 128 122 118     Lab Results   Lab Value Date/Time    HGBA1C 9.00 (H) 02/05/2024 2124    HGBA1C 11.10 (H) 09/06/2023 0859                 Medications    Medications Reviewed: Yes  Pertinent: insulin, bowel regimen ordered  Infusion: insulin, Cardene, NS .9 @ 100 ml/hr, propofol at 8.16 ml/hr  PRN:     Intake/Ouptut 24 hrs (0701 - 0700)   I&O's Reviewed: Yes   Intake & Output (last day)         02/06 0701 02/07 0700 02/07 0701 02/08 0700    I.V. (mL/kg) 3313.3 (47.9) 31 (0.4)    IV Piggyback  100    Total Intake(mL/kg) 3313.3 (47.9) 131 (1.9)    Urine (mL/kg/hr) 1005 (0.6) 130 (0.4)    Emesis/NG output 0     Stool 0     Total Output 1005 130    Net +2308.3 +1          Stool Unmeasured Occurrence 4 x     Emesis Unmeasured Occurrence 3 x             Anthropometrics     Height: Height: 172 cm (67.72\")  Last Filed Weight: Weight: 69.2 kg (152 lb 8.9 oz) (02/07/24 0600)  Method: Weight Method: Bed scale  BMI: BMI (Calculated): 23.4  BMI " "classification: Normal: 18.5-24.9kg/m2  IBW:      UBW: ~130-140 lb past year per EMR:   Weight       Weight (kg) Weight (lbs) Weight Method Visit Report   2/3/2023 59.058 kg  130 lb 3.2 oz  Standing scale     3/15/2023 63.05 kg  139 lb   --    4/30/2023 61.236 kg  135 lb  Stated     5/5/2023 61.236 kg  135 lb      6/26/2023 62.143 kg  137 lb   --    7/3/2023 63.957 kg  141 lb   --    8/10/2023 63.05 kg  139 lb   --    9/8/2023 60.601 kg  133 lb 9.6 oz  Standing scale     10/27/2023 60.328 kg  133 lb   --    11/9/2023 64.411 kg  142 lb   --    12/5/2023 65.828 kg  145 lb 2 oz   --    2/5/2024 65.772 kg  145 lb  Estimated     2/6/2024 65 kg  143 lb 4.8 oz  Bed scale      68.2 kg  150 lb 5.7 oz        Weight change: no verifiable significant changes    Nutrition Focused Physical Exam     Date: 2/6    Unable to perform exam due to: Pt unable to participate at time of visit    Needs Assessment   Date: 2/6    Height used:Height: 172 cm (67.72\")  Weights used: 65 kg (ABW at admission)      Estimated Calorie needs: ~1600 Kcal/day  Method:  Kcals/KG 25 = 1625  Method:  MSJ = 1239 X 1.3 = 1611    Estimated Protein needs: ~72 g PRO/day  Method: 1-1.2 g/Kg = 65-78 w/ CKD and current renal fx    Estimated Fluid needs: ~ ml/day   Per clinical status    Current Nutrition Prescription     PO: NPO Diet NPO Type: Strict NPO  Oral Nutrition Supplement:   Intake: N/A    Nutrition Diagnosis   Date: 2/6 Updated:   Problem Inadequate oral intake   Etiology CVA, dysphagia    Signs/Symptoms NPO per initial SLP evaluation   Status:     Date: 2/6  Updated:  Problem Altered GI function   Etiology Gastroparesis    Signs/Symptoms Chronic, with gastric stimulator   Status:     Goal:   General: Nutrition support treatment  PO: Advace diet as medically feasible/appropriate  EN/PN: Initiate EN     Nutrition Intervention      Follow treatment progress, Care plan reviewed    Initiate EN     Monitoring/Evaluation:   Per protocol, I&O, Pertinent labs, " Weight, GI status, Symptoms, POC/GOC, Swallow function      Kylah Gonzalez RD, CNSC  Time Spent: 30m

## 2024-02-07 NOTE — PROGRESS NOTES
Neurology Note    Patient:  Thelma Michael    YOB: 1960    REFERRING PHYSICIAN:  Dr. Norton    CHIEF COMPLAINT:    Left hemiparesis    HISTORY OF PRESENT ILLNESS:   The patient was intubated last night for aspiration PNA, stared on Rocephin, currently on propofol. No seizures reported.    Past Medical History:  Past Medical History:   Diagnosis Date    Acid reflux     Acute bronchitis     Cardiac murmur     Depression with anxiety 10/05/2020    Diabetes mellitus     Diabetes mellitus type I     Gastroesophageal reflux disease 05/13/2016    H/O echocardiogram 08/07/2012    i. LVEF 65%.ii. Mild LVH.iii. Borderline evidence of atrial septal aneurysm.  No PFO.     History of nuclear stress test 08/22/2014    Negative for ischemia and scars; LVEF 77%.      History of TIAs 07/15/2021    Hyperlipidemia     Hypertension     Impacted cerumen of both ears     Migraine     Migraines 10/31/2019    Self-catheterizes urinary bladder     Sinusitis     Stroke     Tobacco abuse     quit 4 days ago.      Urticaria     Vitamin D deficiency 05/13/2016       Past Surgical History:  Past Surgical History:   Procedure Laterality Date    CAPSULE ENDOSCOPY  07/27/2021    Procedure: PILLCAM DEPLOYMENT;  Surgeon: Mikael Worthy MD;  Location:  [facility];  Service: Gastroenterology;;    COLONOSCOPY      COLONOSCOPY N/A 07/27/2021    Procedure: COLONOSCOPY;  Surgeon: Mikael Worthy MD;  Location:  [facility];  Service: Gastroenterology;  Laterality: N/A;    DENTAL PROCEDURE      ENDOSCOPY N/A 06/30/2021    Procedure: ESOPHAGOGASTRODUODENOSCOPY;  Surgeon: Brunner, Mark I, MD;  Location:  [facility];  Service: Gastroenterology;  Laterality: N/A;    ENDOSCOPY N/A 07/27/2021    Procedure: ESOPHAGOGASTRODUODENOSCOPY;  Surgeon: Mikael Worthy MD;  Location:  [facility];  Service: Gastroenterology;  Laterality: N/A;    ENDOSCOPY WITH JTUBE N/A 03/16/2022    Procedure:  ESOPHAGOGASTRODUODENOSCOPY WITH JEJUNAL TUBE INSERTION;  Surgeon: Thom Glover MD;  Location: [facility];  Service: Gastroenterology;  Laterality: N/A;    GASTRIC STIMULATOR IMPLANT SURGERY N/A     For gastropareisis    UPPER GASTROINTESTINAL ENDOSCOPY         Social History:   Social History     Socioeconomic History    Marital status:    Tobacco Use    Smoking status: Former     Packs/day: 0.25     Years: 30.00     Additional pack years: 0.00     Total pack years: 7.50     Types: Cigarettes     Quit date: 2019     Years since quittin.7    Smokeless tobacco: Never    Tobacco comments:     BC PL never smoker    Vaping Use    Vaping Use: Never used   Substance and Sexual Activity    Alcohol use: Yes     Alcohol/week: 1.0 standard drink of alcohol     Types: 1 Glasses of wine per week     Comment: social    Drug use: Not Currently     Frequency: 2.0 times per week     Types: Marijuana    Sexual activity: Not Currently     Comment: Single         Family History:   Family History   Problem Relation Age of Onset    Anxiety disorder Other     Arthritis Other     ADD / ADHD Other     Heart disease Other         cardiac disorder    Depression Other     Diabetes Other     Hyperlipidemia Other     Hypertension Other     Lung cancer Other     Osteoporosis Other     Hypertension Brother     Diabetes Brother     Hyperlipidemia Mother     Hypertension Mother     Colon cancer Neg Hx        Medications Prior to Admission:    Prior to Admission medications    Medication Sig Start Date End Date Taking? Authorizing Provider   acetaminophen (TYLENOL) 325 MG tablet Take 2 tablets by mouth Every 4 (Four) Hours As Needed for Mild Pain . 21   Marta Rodriguez PA-C   albuterol sulfate  (90 Base) MCG/ACT inhaler Inhale 2 puffs Every 4 (Four) Hours As Needed for Wheezing. 23   Jordan Millan APRN   amLODIPine (NORVASC) 10 MG tablet Take 1 tablet by mouth Daily. 23   Rajat Akers APRN    atorvastatin (Lipitor) 80 MG tablet Take 1 tablet by mouth Daily. 10/19/23   Monet Howe APRN   Blood Glucose Monitoring Suppl (FreeStyle Lite) w/Device kit 1 Device 3 (Three) Times a Day. 8/4/23   Monet Howe APRN   calcium carbonate (TUMS) 500 MG chewable tablet Chew 1,000 mg 3 (Three) Times a Day As Needed for Indigestion or Heartburn. 5/6/21   Marta Rodriguez PA-C   carvedilol (COREG) 12.5 MG tablet Take 1 tablet by mouth 2 (Two) Times a Day With Meals. 9/18/23   Delia Triplett, DO   Continuous Blood Gluc Transmit (Dexcom G6 Transmitter) misc 1 each by Other route Every 3 (Three) Months. 1/9/24   Gerson Ochoa MD   famotidine (PEPCID) 20 MG tablet Take 1 tablet by mouth Every Morning. 9/19/23   Delia Triplett DO   ferrous sulfate 325 (65 Fe) MG tablet Take with Vitamin C or Juice  Patient taking differently: Daily With Breakfast. Take with Vitamin C or Juice 7/11/23   Monet Howe APRN   gabapentin (NEURONTIN) 100 MG capsule Take 1 capsule by mouth 3 (Three) Times a Day As Needed (back pain and neuropathy). 10/19/23   Monet Howe APRN   glucose blood test strip Check blood sugar 5 times daily as needed. 6/26/23   Monet Howe APRN   Insulin Infusion Pump (T:slim X2 Insulin Pump) device Use.    Provider, MD Reddy   Insulin Lispro (humaLOG) 100 UNIT/ML injection Inject 2-7 Units under the skin into the appropriate area as directed 4 (Four) Times a Day Before Meals & at Bedtime. 1/9/24   Gerson Ochoa MD   lactobacillus acidophilus (RISAQUAD) capsule capsule Take 1 capsule by mouth Daily. 5/5/23   Rajat Akers APRN   lidocaine (LIDODERM) 5 % Place 1 patch on the skin as directed by provider Daily. No more than 1 patch per 24 hours. 1/25/24   Monet Howe, HOWIE   losartan (COZAAR) 100 MG tablet Take 1 tablet by mouth Daily. 1/19/24   Monet Howe APRN   melatonin 5 MG tablet  tablet Take 1 tablet by mouth At Night As Needed (sleep). 5/6/21   Marta Rodriguez PA-C   mirtazapine (REMERON) 7.5 MG tablet Take 1 tablet by mouth Every Night. 6/26/23   Monet Howe APRN   Multivitamin tablet tablet Take 1 tablet by mouth Daily. 8/16/23   Monet Howe APRN   prochlorperazine (COMPAZINE) 5 MG tablet Take 1 tablet by mouth Every 6 (Six) Hours As Needed for Nausea or Vomiting. 9/18/23   Delia Triplett DO   rivaroxaban (XARELTO) 15 MG tablet Take 1 tablet by mouth Daily With Dinner 6/13/23   Krishna Jean MD   terazosin (HYTRIN) 1 MG capsule Take 1 capsule by mouth Every Night. 8/16/23   Monet Howe APRN   traMADol (ULTRAM) 50 MG tablet Take 1 tablet by mouth 2 (Two) Times a Day As Needed for Moderate Pain. 11/9/23   Monet Howe APRN   vitamin D (ERGOCALCIFEROL) 1.25 MG (65938 UT) capsule capsule Take 1 capsule by mouth 1 (One) Time Per Week. 7/11/23   Monet Howe APRN   Insulin Glargine (BASAGLAR KWIKPEN) 100 UNIT/ML injection pen Inject 10 Units under the skin into the appropriate area as directed Every Night. 3/19/22 3/19/22  Kath Rosado MD       Allergies:  Patient has no known allergies.      Review of system  Review of Systems   Unable to perform ROS: Intubated       Vitals:    02/07/24 1000   BP: 126/59   Pulse: 93   Resp:    Temp:    SpO2: 96%       Physical exam  Physical Exam  Constitutional:       Interventions: She is intubated.   Eyes:      Pupils: Pupils are equal, round, and reactive to light.   Cardiovascular:      Rate and Rhythm: Normal rate and regular rhythm.   Pulmonary:      Effort: She is intubated.   Neurological:      Deep Tendon Reflexes: Babinski sign absent on the right side. Babinski sign present on the left side.      Comments: Eyes closed, no response to voice, eyes midline with a slight exotropia, left facial droop, withdraws right side better than left.           Lab Results    Component Value Date    WBC 12.97 (H) 02/06/2024    HGB 10.4 (L) 02/06/2024    HCT 31.2 (L) 02/06/2024    MCV 90.7 02/06/2024     02/06/2024     Lab Results   Component Value Date    GLUCOSE 549 (C) 02/06/2024    BUN 37 (H) 02/06/2024    CREATININE 1.64 (H) 02/06/2024    EGFRIFNONA 61 01/06/2015    EGFRIFAFRI 62 02/13/2022    BCR 22.6 02/06/2024    CO2 20.0 (L) 02/06/2024    CALCIUM 8.1 (L) 02/06/2024    PROTENTOTREF 7.6 01/06/2015    ALBUMIN 4.0 02/05/2024    LABIL2 1.6 01/06/2015    AST 13 02/05/2024    ALT 13 02/05/2024         Radiological Studies:   XR Abdomen KUB    Result Date: 2/7/2024  XR ABDOMEN KUB Date of Exam: 2/7/2024 4:53 AM EST Indication: sbft post-pyloric goal Comparison: 2/6/2024. Findings: Enteric tube tip is in the region of the pylorus or duodenal bulb. No bowel dilatation is present. Gastric stimulator device is again noted.     Impression: Feeding tube tip in the pylorus or duodenal bulb. Electronically Signed: Kerri Peters MD  2/7/2024 8:23 AM EST  Workstation ID: [id number]    XR Chest 1 View    Result Date: 2/7/2024  XR CHEST 1 VW Date of Exam: 2/7/2024 4:52 AM EST Indication: Intubated Patient Comparison: 2/6/2024 Findings: ET tube in satisfactory position. NG tube courses through the thorax. There are increasing multifocal airspace opacities, right greater than left with an increasing right effusion. Right upper extremity PICC line in satisfactory position.     Impression: 1. Increasing multifocal, right greater than left opacities and increasing right effusion. Favor multifocal pneumonia. 2. Support apparatus in satisfactory position. Electronically Signed: Skip Valente MD  2/7/2024 8:13 AM EST  Workstation ID: [id number]    CT Head Without Contrast    Result Date: 2/7/2024  CT HEAD WO CONTRAST Date of Exam: 2/7/2024 12:56 AM EST Indication: Stroke, follow up. Comparison: CT scan of the head from February 6, 2024 at 1655 hours Technique: Axial CT images were obtained of the  head without contrast administration.  Automated exposure control and iterative construction methods were used. Findings: There is an evolving right middle cerebral artery infarct with diffuse low-attenuation throughout the MCA distribution. Hemorrhagic conversion is noted within the anterior aspect of the right temporal lobe. There is surrounding diffuse cerebral edema. There is no significant midline shift. The ventricles are not significantly effaced. A low-attenuation area in the right cerebellum has developed in the interval. A new focus of infarct is not excluded. Chronic microvascular ischemic changes stable.     Impression: Evolving right middle cerebral artery distribution infarct with hemorrhagic conversion in the anterior aspect of the right temporal lobe. The overall appearance of the hemorrhagic component is unchanged and there is no evidence of new edema or mass effect. There may be a new developing right cerebellar subacute infarct. Electronically Signed: Berhane Cano MD  2/7/2024 2:09 AM EST  Workstation ID: [id number]    XR Abdomen KUB    Result Date: 2/6/2024  XR ABDOMEN KUB Date of Exam: 2/6/2024 5:38 PM CST Indication: kiofeed placement Comparison: None available. Findings: There is a gastric tube with tip in the peripyloric region. No evidence of intestinal obstruction. No acute osseous process identified. No abnormal calcifications are identified.  Moderate fecal load.     Impression: Enteric tube tip in the peripyloric region. Electronically Signed: Ramón Archibald MD  2/6/2024 6:02 PM CST  Workstation ID: [id number]    XR Chest 1 View    Result Date: 2/6/2024  XR CHEST 1 VW Date of Exam: 2/6/2024 5:38 PM CST Indication: intubation Comparison: 2/5/2024 Findings: Cardiomediastinal silhouette is unremarkable. There is an endotracheal tube with tip approximately 2 cm from the jamel. There is an enteric tube with the most distal visualized portion overlying the proximal stomach. There is a right-sided  PICC with tip  at the cavoatrial junction there is right lower lung airspace disease. No pneumothorax nor significant effusion. No acute osseous abnormality identified.     Impression: 1.Support lines and tubes as detailed above. 2.Right lower lobe airspace disease. Electronically Signed: Ramón Archibald MD  2/6/2024 5:59 PM CST  Workstation ID: [id number]    CT Head Without Contrast    Result Date: 2/6/2024  CT HEAD WO CONTRAST Date of Exam: 2/6/2024 4:50 PM EST Indication: s/p thrombectomy neuro decline Worsening stroke symptoms s/p thrombectomy. Comparison: 2/5/2024 Technique: Axial CT images were obtained of the head without contrast administration.  Automated exposure control and iterative construction methods were used. Findings: Hemorrhagic conversion right sided infarct with intraparenchymal hemorrhage within the right temporal lobe and large infarct of the right posterior frontal and right parietal lobes. Surrounding localized mass effect. No intraventricular extension. Extensive chronic small vessel/microangiopathic ischemic changes. The posterior fossa appears normal. Sellar and suprasellar structures are normal. Orbital and periorbital soft tissues are normal. The paranasal sinuses, ethmoid air cells, and mastoid air cells are aerated. The bony calvarium appears intact. No acute fractures. No lytic or blastic bony diseases.     Impression: Right MCA territory infarct with hemorrhagic conversion into the right temporal lobe. Localized surrounding mass effect.. As of 5:01 p.m. on 2/6/2024 the care team is aware. Electronically Signed: Souleymane Boone MD  2/6/2024 5:04 PM EST  Workstation ID: [id number]    Adult Transthoracic Echo Complete W/ Cont if Necessary Per Protocol (With Agitated Saline)    Result Date: 2/6/2024    Left ventricular systolic function is normal. Left ventricular ejection fraction appears to be 61 - 65%.   Left ventricular wall thickness is consistent with moderate concentric hypertrophy.    Left atrial volume is mildly increased.   Saline test results are negative.     EEG    Result Date: 2/6/2024  Reason for referral: 63 y.o.female with left-sided weakness, speech changes okay Technical Summary:  A 19 channel digital EEG was performed using the international 10-20 placement system, including eye leads and EKG leads. Duration: 20 minutes Findings: The patient is drowsy throughout much of the study.  The background shows diffuse low to medium amplitude 5-7 Hz theta with an overlay of EMG artifact.  A clear posterior rhythm is not seen.  Stage II sleep is not clearly present.  No epileptiform activity or electrographic seizures are present.  Hyperventilation and photic stimulation are not performed.  No electrographic seizures are present. Video: Available Technical quality: Superior EKG: Regular, 80 bpm SUMMARY: Generalized slow Excess drowsiness No focal features or epileptiform activity are seen     Diffuse cerebral dysfunction of mild degree, nonspecific No evidence for epilepsy is seen This report is transcribed using the Dragon dictation system.      XR Abdomen KUB    Result Date: 2/6/2024  XR ABDOMEN KUB Date of Exam: 2/6/2024 2:56 PM EST Indication: ng tube placement Comparison: None available. Findings: Single portable supine view. There is an NG tube with its tip in the distal gastric antrum or pylorus. No bowel dilatation is seen.     Impression: NG tube tip in the distal stomach. Electronically Signed: Kerri Peters MD  2/6/2024 3:05 PM EST  Workstation ID: [id number]    CT Angiogram Head w AI Analysis of LVO    Result Date: 2/5/2024  CT ANGIOGRAM HEAD W AI ANALYSIS OF LVO, CT ANGIOGRAM NECK, CT CEREBRAL PERFUSION W WO CONTRAST Date of Exam: 2/5/2024 8:46 PM EST Indication: Neuro deficit, acute stroke suspected Neuro deficit, acute stroke suspected. Comparison: Concurrently performed noncontrast head CT. Technique: Axial CT images of the brain were obtained prior to and after the  administration of 150 mL Isovue-370. CT Perfusion protocol was utilized. Automated post processing was performed by RAPID software and submitted to PACS for interpretation.  CTA  of the head and neck were performed after the administration of iodinated contrast.  Reconstructed coronal and sagittal images were also obtained. A 3-D volume rendered image was created for interpretation. Automated exposure control and iterative reconstruction methods were used. Findings: CT Perfusion: No evidence of core infarct. Ischemic penumbra present within the right MCA territory, however unable to quantify given background of generalized hypoperfusion. Vascular findings: Visualized portions of the aortic arch are patent without evidence of significant ostial stenosis. Common carotid arteries are patent bilaterally. Mixed atheromatous disease of the bilateral carotid artery bifurcations without significant stenosis by NASCET criteria. The cervical portions of the internal carotid arteries are patent bilaterally. The intracranial portions of the bilateral internal carotid arteries are patent with atherosclerotic disease present. Abrupt vessel cut off of the proximal M2  segment of the right MCA. Left MCA is patent. 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 The vertebral arteries are patent bilaterally. The basilar artery is patent. The posterior cerebral arteries are patent bilaterally. No evidence of dissection or aneurysm. Nonvascular findings: Lung apices are grossly clear with mild paraseptal emphysematous changes present. 1.5 cm right thyroid nodule. Patient is edentulous. Aerodigestive structures appear within normal limits. No suspicious lymphadenopathy. Degenerative changes of the cervical spine with reversal of the normal cervical lordosis. No acute or suspicious osseous lesions.     Impression: Occlusion of the proximal M2 segment of the right  MCA. No evidence of core infarct with ischemic penumbra present within the right MCA territory, however unable to quantify given background of generalized hypoperfusion. Findings discussed with Gautam of the stroke team at 9:34 p.m. on 2/5/2024. Electronically Signed: Raymond Garcia MD  2/5/2024 9:56 PM EST  Workstation ID: [id number]    CT CEREBRAL PERFUSION WITH & WITHOUT CONTRAST    Result Date: 2/5/2024  CT ANGIOGRAM HEAD W AI ANALYSIS OF LVO, CT ANGIOGRAM NECK, CT CEREBRAL PERFUSION W WO CONTRAST Date of Exam: 2/5/2024 8:46 PM EST Indication: Neuro deficit, acute stroke suspected Neuro deficit, acute stroke suspected. Comparison: Concurrently performed noncontrast head CT. Technique: Axial CT images of the brain were obtained prior to and after the administration of 150 mL Isovue-370. CT Perfusion protocol was utilized. Automated post processing was performed by RAPID software and submitted to PACS for interpretation.  CTA  of the head and neck were performed after the administration of iodinated contrast.  Reconstructed coronal and sagittal images were also obtained. A 3-D volume rendered image was created for interpretation. Automated exposure control and iterative reconstruction methods were used. Findings: CT Perfusion: No evidence of core infarct. Ischemic penumbra present within the right MCA territory, however unable to quantify given background of generalized hypoperfusion. Vascular findings: Visualized portions of the aortic arch are patent without evidence of significant ostial stenosis. Common carotid arteries are patent bilaterally. Mixed atheromatous disease of the bilateral carotid artery bifurcations without significant stenosis by NASCET criteria. The cervical portions of the internal carotid arteries are patent bilaterally. The intracranial portions of the bilateral internal carotid arteries are patent with atherosclerotic disease present. Abrupt vessel cut off of the proximal M2  segment of the  right MCA. Left MCA is patent. 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 The vertebral arteries are patent bilaterally. The basilar artery is patent. The posterior cerebral arteries are patent bilaterally. No evidence of dissection or aneurysm. Nonvascular findings: Lung apices are grossly clear with mild paraseptal emphysematous changes present. 1.5 cm right thyroid nodule. Patient is edentulous. Aerodigestive structures appear within normal limits. No suspicious lymphadenopathy. Degenerative changes of the cervical spine with reversal of the normal cervical lordosis. No acute or suspicious osseous lesions.     Impression: Occlusion of the proximal M2 segment of the right MCA. No evidence of core infarct with ischemic penumbra present within the right MCA territory, however unable to quantify given background of generalized hypoperfusion. Findings discussed with Gautam of the stroke team at 9:34 p.m. on 2/5/2024. Electronically Signed: Raymond Garcia MD  2/5/2024 9:56 PM EST  Workstation ID: [id number]    CT Angiogram Neck    Result Date: 2/5/2024  CT ANGIOGRAM HEAD W AI ANALYSIS OF LVO, CT ANGIOGRAM NECK, CT CEREBRAL PERFUSION W WO CONTRAST Date of Exam: 2/5/2024 8:46 PM EST Indication: Neuro deficit, acute stroke suspected Neuro deficit, acute stroke suspected. Comparison: Concurrently performed noncontrast head CT. Technique: Axial CT images of the brain were obtained prior to and after the administration of 150 mL Isovue-370. CT Perfusion protocol was utilized. Automated post processing was performed by RAPID software and submitted to PACS for interpretation.  CTA  of the head and neck were performed after the administration of iodinated contrast.  Reconstructed coronal and sagittal images were also obtained. A 3-D volume rendered image was created for interpretation. Automated exposure control and iterative reconstruction methods were  used. Findings: CT Perfusion: No evidence of core infarct. Ischemic penumbra present within the right MCA territory, however unable to quantify given background of generalized hypoperfusion. Vascular findings: Visualized portions of the aortic arch are patent without evidence of significant ostial stenosis. Common carotid arteries are patent bilaterally. Mixed atheromatous disease of the bilateral carotid artery bifurcations without significant stenosis by NASCET criteria. The cervical portions of the internal carotid arteries are patent bilaterally. The intracranial portions of the bilateral internal carotid arteries are patent with atherosclerotic disease present. Abrupt vessel cut off of the proximal M2  segment of the right MCA. Left MCA is patent. 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 The vertebral arteries are patent bilaterally. The basilar artery is patent. The posterior cerebral arteries are patent bilaterally. No evidence of dissection or aneurysm. Nonvascular findings: Lung apices are grossly clear with mild paraseptal emphysematous changes present. 1.5 cm right thyroid nodule. Patient is edentulous. Aerodigestive structures appear within normal limits. No suspicious lymphadenopathy. Degenerative changes of the cervical spine with reversal of the normal cervical lordosis. No acute or suspicious osseous lesions.     Impression: Occlusion of the proximal M2 segment of the right MCA. No evidence of core infarct with ischemic penumbra present within the right MCA territory, however unable to quantify given background of generalized hypoperfusion. Findings discussed with Gautam of the stroke team at 9:34 p.m. on 2/5/2024. Electronically Signed: Raymond Garcia MD  2/5/2024 9:56 PM EST  Workstation ID: [id number]    XR Chest 1 View    Result Date: 2/5/2024  XR CHEST 1 VW Date of Exam: 2/5/2024 9:39 PM EST Indication: Acute Stroke  Protocol (onset < 12 hrs) Comparison: None available. Findings: No focal consolidation. No pneumothorax or pleural effusion. Cardiac size is normal. The visualized clavicles appear intact. No displaced rib fractures. The visualized upper abdomen is normal.     Impression: No acute cardiopulmonary disease. Electronically Signed: Souleymane Boone MD  2/5/2024 9:51 PM EST  Workstation ID: [id number]    CT Head Without Contrast Stroke Protocol    Result Date: 2/5/2024  CT HEAD WO CONTRAST STROKE PROTOCOL Date of Exam: 2/5/2024 8:42 PM EST Indication: Neuro deficit, acute, stroke suspected. Comparison: Brain MRI and head CT 2/4/2023. Technique: Axial CT images were obtained of the head without contrast administration.  Reconstructed coronal images were also obtained. Automated exposure control and iterative construction methods were used. Scan Time: 20:42 Results discussed with the clinical team via telephone by Raymond Garcia MD at 20:45. Findings: No evidence of acute intracranial hemorrhage or mass effect. No extra-axial collection. The gray white matter differentiation is preserved. Global parenchymal atrophy. Periventricular and subcortical white matter hypodensity, nonspecific, but likely sequela of chronic small vessel ischemic disease. Chronic infarcts in the left cerebellum and right basal ganglia. The mastoid air cells and paranasal sinuses are well aerated. Globes and extraocular muscles are unremarkable. No acute or suspicious osseous abnormality. Soft tissues within normal limits.     Impression: No evidence of acute intracranial abnormality. Similar chronic findings, including advanced white matter change that is likely sequela of chronic small vessel ischemic disease. Electronically Signed: Raymond Garcia MD  2/5/2024 8:52 PM EST  Workstation ID: [id number]       During this visit the following were done:  Labs Reviewed [x]    Labs Ordered []    Radiology Reports Reviewed [x]    Radiology Ordered []    EKG, echo,  and/or stress test reviewed [x]    EEG results reviewed  [x]    EEG reviewed and interpreted per myself   []    Discussed case with neurointerventionalist or neuroradiologist []    Referring Provider Records Reviewed []    ER Records Reviewed []    Hospital Records Reviewed []    History Obtained From Family []    Radiological images view and Interpreted per myself [x]    Case Discussed with referring provider []     Decision to obtain and request outside records  []        Assessment and Plan     Large acute right MCA stroke, s/p M2 MT, with reperfusion hemorrhage and edema, mild shift, question of a new right cerebellar stroke on CT head this am, ICH stable. EEG w/o epileptic changes. Likely cause PAF. Respiratory failure, aspiration PNA. Unfavorable prognosis for functional recovery. Discussed with patient's daughter.   - Wean sedation and vent as tolerated.   - BP<140/90, use Cardene prn.   - Stopped aspirin.   - Daily CT head.   - Consider 3% saline if edema worsens.                 Electronically signed by Genaro Solomon MD on 2/7/2024 at 12:31 EST

## 2024-02-07 NOTE — SIGNIFICANT NOTE
Called by nursing staff 2* to patient's vomiting / noted Qtc and small dose compazine ordered. Called again by nursing staff 2* respiratory distress. Upon arrival at bedside, patient tachypneic, tachycardic, and less responsive than earlier. Noted SPO2 88-92% on NRB. She is unable to verbally communicate. Per nursing staff, patient had emesis x 3 and then developed the respiratory difficulties. Also noted new CT head, obtained 2* neuro changes, demonstrating RMCA infarct with hemorrhagic conversion. Patient no longer capable of protecting her airway. Discussed with family and recommended intubation if they wanted to continue full interventions at his time. Please see Stroke Neurology's note for their conversation as well. She has a very grave prognosis and expected poor outcome. Ultimately, family including patient's son Keshav who is the POA wishes to continue full interventions including intubation at this time. Patient intubated without difficulty.     Electronically signed by HOWIE Ambrosio, 02/07/24, 7:10 AM EST.

## 2024-02-07 NOTE — SIGNIFICANT NOTE
02/07/24 0841   SLP Deferred Reason   SLP Deferred Reason Unable to evaluate, medical status change  (Noted pt intubated. Will place swallowing/cognitive-communication interventions on hold until pt medically appropriate to participate.)

## 2024-02-07 NOTE — PLAN OF CARE
Goal Outcome Evaluation:                     Problem: Restraint, Nonviolent  Goal: Absence of Harm or Injury  Outcome: Ongoing, Progressing  Intervention: Implement Least Restrictive Safety Strategies  Recent Flowsheet Documentation  Taken 2/7/2024 0600 by Cassandra Brewer RN  Medical Device Protection: IV pole/bag removed from visual field  Less Restrictive Alternative:   calming techniques promoted   coping techniques promoted   bed alarm in use   positive reinforcement provided   safety enhancements provided  De-Escalation Techniques:   quiet time facilitated   reoriented  Taken 2/7/2024 0400 by Cassandra Brewer RN  Medical Device Protection:   IV pole/bag removed from visual field   tubing secured   torso covered  Less Restrictive Alternative:   calming techniques promoted   coping techniques promoted   bed alarm in use   positive reinforcement provided   safety enhancements provided  De-Escalation Techniques:   quiet time facilitated   increased round frequency   stimulation decreased  Taken 2/7/2024 0200 by Cassandra Brewer RN  Medical Device Protection:   IV pole/bag removed from visual field   tubing secured   torso covered  Less Restrictive Alternative:   calming techniques promoted   coping techniques promoted   bed alarm in use   positive reinforcement provided   safety enhancements provided  De-Escalation Techniques:   reoriented   medication administered   stimulation decreased  Diversional Activities: (decrease stimulation) other (see comments)  Taken 2/7/2024 0000 by Cassandra Brewer RN  Medical Device Protection:   IV pole/bag removed from visual field   tubing secured   torso covered  Less Restrictive Alternative:   calming techniques promoted   coping techniques promoted   bed alarm in use   positive reinforcement provided   safety enhancements provided  De-Escalation Techniques:   reoriented   stimulation decreased   increased round frequency  Diversional Activities: (decrease  stimulation) other (see comments)  Taken 2/6/2024 2200 by Cassandra Brewer RN  Medical Device Protection:   IV pole/bag removed from visual field   tubing secured   torso covered  Less Restrictive Alternative:   calming techniques promoted   coping techniques promoted   bed alarm in use   positive reinforcement provided   safety enhancements provided  De-Escalation Techniques:   reoriented   stimulation decreased   increased round frequency  Diversional Activities: (decrease stimulation) other (see comments)  Taken 2/6/2024 2000 by Cassandra Brewer RN  Medical Device Protection:   IV pole/bag removed from visual field   tubing secured   torso covered  Less Restrictive Alternative:   calming techniques promoted   coping techniques promoted   bed alarm in use   positive reinforcement provided   safety enhancements provided  De-Escalation Techniques:   appropriate behavior reinforced   medication administered   increased round frequency   stimulation decreased   reoriented  Diversional Activities: (decrease stimulation) other (see comments)  Intervention: Protect Skin and Joint Integrity  Recent Flowsheet Documentation  Taken 2/7/2024 0600 by Cassandra Brewer RN  Body Position:   turned   supine  Taken 2/7/2024 0400 by Cassandra Brewer RN  Body Position:   left   turned  Taken 2/7/2024 0200 by Cassandra Brewer RN  Body Position:   right   turned  Taken 2/7/2024 0000 by Cassandra Brewer RN  Body Position:   turned   supine  Taken 2/6/2024 2200 by Cassandra Brewer RN  Body Position: left  Taken 2/6/2024 2000 by Cassandra Brewer RN  Body Position: right

## 2024-02-07 NOTE — CONSULTS
Chart reviewed per stroke protocol. Not a candidate for the telehealth stroke/diabetes class as bmi less than 25. Pt intubated , not appropriate for education at this time. Please re consult or call [phone] for immediate education needs.

## 2024-02-07 NOTE — NURSING NOTE
0000 - changes in neuro assessment noted  0020 - sedation paused  0030 - Stroke NP notified of changes and increased RR.   0100 - Ct completed, Stroke NP at bedside to assess  0200 - Stroke NP updated regarding NIH score increase, no new orders, awaiting results of CT head

## 2024-02-07 NOTE — PLAN OF CARE
Problem: Skin Injury Risk Increased  Goal: Skin Health and Integrity  Outcome: Ongoing, Progressing  Intervention: Optimize Skin Protection  Recent Flowsheet Documentation  Taken 2/6/2024 1800 by Gabriel Plascencia RN  Pressure Reduction Techniques: frequent weight shift encouraged  Pressure Reduction Devices:   specialty bed utilized   pressure-redistributing mattress utilized   positioning supports utilized  Skin Protection:   adhesive use limited   tubing/devices free from skin contact   skin-to-device areas padded   incontinence pads utilized  Taken 2/6/2024 1600 by Gabriel Plascencia RN  Pressure Reduction Techniques: frequent weight shift encouraged  Head of Bed (HOB) Positioning: [facility] elevated  Pressure Reduction Devices:   specialty bed utilized   pressure-redistributing mattress utilized   positioning supports utilized  Skin Protection:   adhesive use limited   skin-to-device areas padded   tubing/devices free from skin contact   incontinence pads utilized  Taken 2/6/2024 1400 by Gabriel Plascencia RN  Pressure Reduction Techniques:   frequent weight shift encouraged   weight shift assistance provided  Head of Bed (HOB) Positioning: HOB at 30-45 degrees  Pressure Reduction Devices:   specialty bed utilized   pressure-redistributing mattress utilized   positioning supports utilized  Skin Protection:   adhesive use limited   tubing/devices free from skin contact   skin-to-device areas padded   incontinence pads utilized  Taken 2/6/2024 1200 by Gabriel Plascencia RN  Pressure Reduction Techniques: frequent weight shift encouraged  Head of Bed (HOB) Positioning: HOB at 30-45 degrees  Pressure Reduction Devices:   specialty bed utilized   pressure-redistributing mattress utilized   positioning supports utilized  Skin Protection:   adhesive use limited   tubing/devices free from skin contact   skin-to-device areas padded   incontinence pads utilized  Taken 2/6/2024 1000 by Gabriel Plascencia RN  Pressure  Reduction Techniques: frequent weight shift encouraged  Head of Bed (HOB) Positioning: HOB at 30-45 degrees  Pressure Reduction Devices:   specialty bed utilized   pressure-redistributing mattress utilized   positioning supports utilized  Skin Protection:   adhesive use limited   tubing/devices free from skin contact   skin-to-device areas padded   incontinence pads utilized  Taken 2/6/2024 0800 by Gabriel Plascencia RN  Pressure Reduction Techniques: frequent weight shift encouraged  Head of Bed (HOB) Positioning: HOB at 30-45 degrees  Pressure Reduction Devices:   specialty bed utilized   pressure-redistributing mattress utilized   positioning supports utilized  Skin Protection:   adhesive use limited   tubing/devices free from skin contact   skin-to-device areas padded   incontinence pads utilized     Problem: Fall Injury Risk  Goal: Absence of Fall and Fall-Related Injury  Outcome: Ongoing, Progressing  Intervention: Identify and Manage Contributors  Recent Flowsheet Documentation  Taken 2/6/2024 1800 by Gabriel Plascencia RN  Medication Review/Management: medications reviewed  Taken 2/6/2024 1600 by Gabriel Plascencia RN  Medication Review/Management: medications reviewed  Taken 2/6/2024 1400 by Gabriel Plascencia RN  Medication Review/Management: medications reviewed  Taken 2/6/2024 1200 by Gabriel Plascencia RN  Medication Review/Management: medications reviewed  Taken 2/6/2024 1000 by Gabriel Plascencia RN  Medication Review/Management: medications reviewed  Taken 2/6/2024 0800 by Gabriel Plascencia RN  Medication Review/Management: medications reviewed  Intervention: Promote Injury-Free Environment  Recent Flowsheet Documentation  Taken 2/6/2024 1800 by Gabriel Plascencia RN  Safety Promotion/Fall Prevention:   safety round/check completed   room organization consistent   lighting adjusted   fall prevention program maintained   clutter free environment maintained  Taken 2/6/2024 1600 by Gabriel Plascencia  RN  Safety Promotion/Fall Prevention:   safety round/check completed   room organization consistent   lighting adjusted   fall prevention program maintained   clutter free environment maintained  Taken 2/6/2024 1400 by Gabriel Plascencia RN  Safety Promotion/Fall Prevention:   safety round/check completed   room organization consistent   lighting adjusted   fall prevention program maintained   clutter free environment maintained  Taken 2/6/2024 1200 by Gabriel Plascencia RN  Safety Promotion/Fall Prevention:   safety round/check completed   room organization consistent   lighting adjusted   fall prevention program maintained   clutter free environment maintained  Taken 2/6/2024 1000 by Gabriel Plascencia RN  Safety Promotion/Fall Prevention:   safety round/check completed   room organization consistent   lighting adjusted   fall prevention program maintained   clutter free environment maintained  Taken 2/6/2024 0800 by Gabriel Plascencia RN  Safety Promotion/Fall Prevention:   safety round/check completed   room organization consistent   lighting adjusted   fall prevention program maintained   clutter free environment maintained     Problem: Adult Inpatient Plan of Care  Goal: Plan of Care Review  Outcome: Ongoing, Progressing  Flowsheets (Taken 2/6/2024 1946)  Outcome Evaluation: PT presents with worsening neurological function, deviation of eyes to the right, increasing agitation and vomiting. Stroke team and ICU NP notified. Pt recieved CT scan and was intubated during shift. Pt remains on medication to control BP, an insulin drip.  Goal: Patient-Specific Goal (Individualized)  Outcome: Ongoing, Progressing  Goal: Absence of Hospital-Acquired Illness or Injury  Outcome: Ongoing, Progressing  Intervention: Identify and Manage Fall Risk  Recent Flowsheet Documentation  Taken 2/6/2024 1800 by Gabriel Plascencia RN  Safety Promotion/Fall Prevention:   safety round/check completed   room organization consistent    lighting adjusted   fall prevention program maintained   clutter free environment maintained  Taken 2/6/2024 1600 by Gabriel Plascencia RN  Safety Promotion/Fall Prevention:   safety round/check completed   room organization consistent   lighting adjusted   fall prevention program maintained   clutter free environment maintained  Taken 2/6/2024 1400 by Gabriel Plascencia RN  Safety Promotion/Fall Prevention:   safety round/check completed   room organization consistent   lighting adjusted   fall prevention program maintained   clutter free environment maintained  Taken 2/6/2024 1200 by Gabriel Plascencia RN  Safety Promotion/Fall Prevention:   safety round/check completed   room organization consistent   lighting adjusted   fall prevention program maintained   clutter free environment maintained  Taken 2/6/2024 1000 by Gabriel Plascencia RN  Safety Promotion/Fall Prevention:   safety round/check completed   room organization consistent   lighting adjusted   fall prevention program maintained   clutter free environment maintained  Taken 2/6/2024 0800 by Gabriel Plascencia RN  Safety Promotion/Fall Prevention:   safety round/check completed   room organization consistent   lighting adjusted   fall prevention program maintained   clutter free environment maintained  Intervention: Prevent Skin Injury  Recent Flowsheet Documentation  Taken 2/6/2024 1800 by Gabriel Plascencia RN  Body Position:   supine   upper extremity elevated   supine, legs elevated  Skin Protection:   adhesive use limited   tubing/devices free from skin contact   skin-to-device areas padded   incontinence pads utilized  Taken 2/6/2024 1600 by Gabriel Plascencia RN  Body Position:   supine   upper extremity elevated   weight shifting  Skin Protection:   adhesive use limited   skin-to-device areas padded   tubing/devices free from skin contact   incontinence pads utilized  Taken 2/6/2024 1400 by Gabriel Plascencia RN  Body Position:   left   turned    upper extremity elevated   legs elevated  Skin Protection:   adhesive use limited   tubing/devices free from skin contact   skin-to-device areas padded   incontinence pads utilized  Taken 2/6/2024 1200 by Gabriel Plascencia RN  Body Position:   supine, legs elevated   upper extremity elevated  Skin Protection:   adhesive use limited   tubing/devices free from skin contact   skin-to-device areas padded   incontinence pads utilized  Taken 2/6/2024 1000 by Gabriel Plascencia RN  Body Position:   upper extremity elevated   left   turned  Skin Protection:   adhesive use limited   tubing/devices free from skin contact   skin-to-device areas padded   incontinence pads utilized  Taken 2/6/2024 0800 by Gabriel Plascencia RN  Body Position:   upper extremity elevated   supine, legs elevated   neutral body alignment   neutral head position  Skin Protection:   adhesive use limited   tubing/devices free from skin contact   skin-to-device areas padded   incontinence pads utilized  Intervention: Prevent and Manage VTE (Venous Thromboembolism) Risk  Recent Flowsheet Documentation  Taken 2/6/2024 1800 by Gabriel Plascencia RN  Activity Management: activity encouraged  Taken 2/6/2024 1600 by Gabriel Plascencia RN  Activity Management: activity encouraged  Taken 2/6/2024 1400 by Gabriel Plascencia RN  Activity Management: activity encouraged  Taken 2/6/2024 1200 by Gabriel Plascencia RN  Activity Management: activity encouraged  Taken 2/6/2024 1000 by Gabriel Plascencia RN  Activity Management: activity minimized  Taken 2/6/2024 0800 by Gabriel Plascencia RN  Activity Management: activity minimized  Intervention: Prevent Infection  Recent Flowsheet Documentation  Taken 2/6/2024 1800 by Gabriel Plascencia RN  Infection Prevention:   visitors restricted/screened   single patient room provided   rest/sleep promoted   hand hygiene promoted   environmental surveillance performed  Taken 2/6/2024 1600 by Gabriel Plascencia RN  Infection  Prevention:   visitors restricted/screened   single patient room provided   rest/sleep promoted   hand hygiene promoted   environmental surveillance performed  Taken 2/6/2024 1400 by Gabriel Plascencia RN  Infection Prevention:   visitors restricted/screened   single patient room provided   rest/sleep promoted   hand hygiene promoted   environmental surveillance performed  Taken 2/6/2024 1200 by Gabriel Plascencia RN  Infection Prevention:   visitors restricted/screened   single patient room provided   rest/sleep promoted   hand hygiene promoted   environmental surveillance performed  Taken 2/6/2024 1000 by Gabriel Plascencia RN  Infection Prevention:   visitors restricted/screened   single patient room provided   rest/sleep promoted   hand hygiene promoted   environmental surveillance performed  Taken 2/6/2024 0800 by Gabriel Plascencia RN  Infection Prevention:   visitors restricted/screened   single patient room provided   rest/sleep promoted   hand hygiene promoted   environmental surveillance performed  Goal: Optimal Comfort and Wellbeing  Outcome: Ongoing, Progressing  Intervention: Monitor Pain and Promote Comfort  Recent Flowsheet Documentation  Taken 2/6/2024 1800 by Gabriel Plascencia RN  Pain Management Interventions:   care clustered   pillow support provided   position adjusted   quiet environment facilitated  Taken 2/6/2024 1600 by Gabriel Plascencia RN  Pain Management Interventions:   care clustered   pillow support provided   position adjusted   quiet environment facilitated  Taken 2/6/2024 1400 by Gabriel Plascencia RN  Pain Management Interventions:   care clustered   pillow support provided   position adjusted   quiet environment facilitated  Taken 2/6/2024 1200 by Gabriel Plascencia RN  Pain Management Interventions:   care clustered   pillow support provided   position adjusted   quiet environment facilitated  Taken 2/6/2024 1000 by Gabriel Plascencia RN  Pain Management Interventions:   care  clustered   pillow support provided   position adjusted   quiet environment facilitated  Taken 2/6/2024 0800 by Gabriel Plascencia RN  Pain Management Interventions:   care clustered   pillow support provided   position adjusted   quiet environment facilitated  Intervention: Provide Person-Centered Care  Recent Flowsheet Documentation  Taken 2/6/2024 1800 by Gabriel Plascencia RN  Trust Relationship/Rapport:   care explained   questions answered   reassurance provided  Taken 2/6/2024 1600 by Gabriel Plascencia RN  Trust Relationship/Rapport:   care explained   questions answered   reassurance provided  Taken 2/6/2024 1400 by Gabriel Plascencia, SUKH  Trust Relationship/Rapport:   care explained   questions answered   reassurance provided  Taken 2/6/2024 1200 by Gabriel Plascencia RN  Trust Relationship/Rapport:   care explained   questions answered   reassurance provided  Taken 2/6/2024 1000 by Gabriel Plascencia, SUKH  Trust Relationship/Rapport:   care explained   questions answered   reassurance provided  Taken 2/6/2024 0800 by Gabriel Plascencia RN  Trust Relationship/Rapport:   care explained   questions answered   reassurance provided  Goal: Readiness for Transition of Care  Outcome: Ongoing, Progressing     Problem: Restraint, Nonviolent  Goal: Absence of Harm or Injury  Outcome: Ongoing, Progressing  Intervention: Implement Least Restrictive Safety Strategies  Recent Flowsheet Documentation  Taken 2/6/2024 1800 by Gabriel Plascencia RN  Medical Device Protection:   IV pole/bag removed from visual field   tubing secured  Less Restrictive Alternative:   calming techniques promoted   coping techniques promoted   bed alarm in use   positive reinforcement provided   safety enhancements provided  De-Escalation Techniques:   appropriate behavior reinforced   increased round frequency   reoriented   stimulation decreased   verbally redirected  Diversional Activities: (quiet room) other (see comments)  Taken 2/6/2024 1600  by Gabriel Plascencia, RN  Medical Device Protection:   IV pole/bag removed from visual field   tubing secured  Less Restrictive Alternative:   calming techniques promoted   coping techniques promoted   bed alarm in use   positive reinforcement provided   safety enhancements provided  De-Escalation Techniques:   appropriate behavior reinforced   increased round frequency   reoriented   stimulation decreased   verbally redirected  Diversional Activities: (quiet room) other (see comments)  Taken 2/6/2024 1400 by Gabriel Plascencia RN  Medical Device Protection:   IV pole/bag removed from visual field   tubing secured  Less Restrictive Alternative:   calming techniques promoted   coping techniques promoted   bed alarm in use   positive reinforcement provided   safety enhancements provided  De-Escalation Techniques:   appropriate behavior reinforced   increased round frequency   reoriented   stimulation decreased   verbally redirected  Diversional Activities: other (see comments)  Taken 2/6/2024 1200 by Gabriel Plascencia, RN  Medical Device Protection:   IV pole/bag removed from visual field   tubing secured  Less Restrictive Alternative:   calming techniques promoted   coping techniques promoted   bed alarm in use   positive reinforcement provided   safety enhancements provided  De-Escalation Techniques:   appropriate behavior reinforced   increased round frequency   reoriented   stimulation decreased   verbally redirected  Diversional Activities: other (see comments)  Taken 2/6/2024 1000 by Gabriel Plascencia, RN  Medical Device Protection:   IV pole/bag removed from visual field   tubing secured  Less Restrictive Alternative:   calming techniques promoted   coping techniques promoted   bed alarm in use   positive reinforcement provided   safety enhancements provided  De-Escalation Techniques:   appropriate behavior reinforced   increased round frequency   reoriented   stimulation decreased   verbally  redirected  Diversional Activities: other (see comments)  Taken 2/6/2024 0800 by Gabriel Plascencia RN  Medical Device Protection:   IV pole/bag removed from visual field   tubing secured  Less Restrictive Alternative:   calming techniques promoted   coping techniques promoted   bed alarm in use   positive reinforcement provided   safety enhancements provided  De-Escalation Techniques:   appropriate behavior reinforced   increased round frequency   reoriented   stimulation decreased   verbally redirected  Diversional Activities: other (see comments)  Intervention: Protect Dignity, Rights, and Personal Wellbeing  Recent Flowsheet Documentation  Taken 2/6/2024 1800 by Gabriel Plascencia RN  Trust Relationship/Rapport:   care explained   questions answered   reassurance provided  Taken 2/6/2024 1600 by Gabriel Plascencia RN  Trust Relationship/Rapport:   care explained   questions answered   reassurance provided  Taken 2/6/2024 1400 by Gabriel Plascencia RN  Trust Relationship/Rapport:   care explained   questions answered   reassurance provided  Taken 2/6/2024 1200 by Gabriel Plascencia RN  Trust Relationship/Rapport:   care explained   questions answered   reassurance provided  Taken 2/6/2024 1000 by Gabriel Plascencia RN  Trust Relationship/Rapport:   care explained   questions answered   reassurance provided  Taken 2/6/2024 0800 by Gabriel Plascencia RN  Trust Relationship/Rapport:   care explained   questions answered   reassurance provided  Intervention: Protect Skin and Joint Integrity  Recent Flowsheet Documentation  Taken 2/6/2024 1800 by Gabriel Plascencia RN  Body Position:   supine   upper extremity elevated   supine, legs elevated  Taken 2/6/2024 1600 by Gabriel Plascencia RN  Body Position:   supine   upper extremity elevated   weight shifting  Taken 2/6/2024 1400 by Gabriel Plascencia RN  Body Position:   left   turned   upper extremity elevated   legs elevated  Taken 2/6/2024 1200 by Gabriel Plascencia  RN  Body Position:   supine, legs elevated   upper extremity elevated  Taken 2/6/2024 1000 by Gabriel Plascencia RN  Body Position:   upper extremity elevated   left   turned  Taken 2/6/2024 0800 by Gabriel Plascencia RN  Body Position:   upper extremity elevated   supine, legs elevated   neutral body alignment   neutral head position     Problem: Diabetes Comorbidity  Goal: Blood Glucose Level Within Targeted Range  Outcome: Ongoing, Progressing     Problem: Hypertension Comorbidity  Goal: Blood Pressure in Desired Range  Outcome: Ongoing, Progressing  Intervention: Maintain Blood Pressure Management  Recent Flowsheet Documentation  Taken 2/6/2024 1800 by Gabriel Plascencia RN  Medication Review/Management: medications reviewed  Taken 2/6/2024 1600 by Gabriel Plascencia RN  Medication Review/Management: medications reviewed  Taken 2/6/2024 1400 by Gabriel Plascencia RN  Medication Review/Management: medications reviewed  Taken 2/6/2024 1200 by Gabriel Plascencia RN  Medication Review/Management: medications reviewed  Taken 2/6/2024 1000 by Gabriel Plascencia RN  Medication Review/Management: medications reviewed  Taken 2/6/2024 0800 by Gabriel Plascencia RN  Medication Review/Management: medications reviewed   Goal Outcome Evaluation:              Outcome Evaluation: PT presents with worsening neurological function, deviation of eyes to the right, increasing agitation and vomiting. Stroke team and ICU NP notified. Pt recieved CT scan and was intubated during shift. Pt remains on medication to control BP, an insulin drip.

## 2024-02-07 NOTE — PROGRESS NOTES
Intensive Care Follow-up     Hospital:  LOS: 2 days   Ms. Thelma Michael, 63 y.o. female is followed for:   CVA (cerebral vascular accident)            History of present illness:   Thelma Michael is a 63 y.o. female who has a PMHX of T1DM, GERD, TIAs, HLD, HTN, & AF for which she is on chronic anticoagulation (Xarelto).     Patient presented to ED via EMS w/ left sided weakness, facial droop, & slurred speech originating @ ~ 1940.  NIHSS 7.  Neuroimaging showed M2 occlusion of proximal R MCA.  Not a TNK candidate given anticoagulant use so was taken for catheter based intervention by Dr. Coronado who performed mechanical thrombectomy of a right MCA occlusion.      She is admitted to the ICU after the procedure.       Subjective   Interval History:  Yesterday patient had acute decompensation, will had an episode of vomiting led to aspiration and intubation.  Follow-up CT of the head showed the right MCA stroke and had a hemorrhagic conversion.             The patient's past medical, surgical and social history were reviewed and updated in Epic as appropriate.       Objective     Infusions:  esmolol,  mcg/kg/min, Last Rate: 25 mcg/kg/min (02/06/24 2024)  insulin, 0-100 Units/hr, Last Rate: Stopped (02/07/24 0631)  niCARdipine, 5-15 mg/hr, Last Rate: Stopped (02/07/24 0512)  propofol, 5-50 mcg/kg/min, Last Rate: 20 mcg/kg/min (02/07/24 0624)  sodium chloride, 100 mL/hr, Last Rate: 100 mL/hr (02/06/24 0138)      Medications:  atorvastatin, 80 mg, Oral, Nightly  cefTRIAXone, 1,000 mg, Intravenous, Q24H  chlorhexidine, 15 mL, Mouth/Throat, Q12H  pantoprazole, 40 mg, Intravenous, Q24H  prochlorperazine, 2.5 mg, Intravenous, Once  senna-docusate sodium, 2 tablet, Oral, BID  sodium chloride, 10 mL, Intravenous, Q12H      I reviewed the patient's medications.    Vital Sign Min/Max for last 24 hours  Temp  Min: 99.8 °F (37.7 °C)  Max: 100.4 °F (38 °C)   BP  Min: 105/91  Max: 146/60   Pulse  Min: 89  Max: 109    Resp  Min: 18  Max: 35   SpO2  Min: 89 %  Max: 97 %   No data recorded       Input/Output for last 24 hour shift  02/06 0701 - 02/07 0700  In: 3313.3 [I.V.:3313.3]  Out: 1005 [Urine:1005]   Mode: VC+/AC  FiO2 (%):  [70 %-100 %] 80 %  S RR:  [16-18] 16  S VT:  [400 mL-450 mL] 400 mL  PEEP/CPAP (cm H2O):  [3 cm H20-8 cm H20] 8 cm H20  MAP (cm H2O):  [9.7-18] 14  GENERAL : Sedated, lying in bed, NAD  RESPIRATORY/THORAX : ET tube in place, Coarse breath sounds bilaterally  CARDIOVASCULAR : Normal S1/S2, RRR. 1+ lower ext edema.  GASTROINTESTINAL : Soft, NT/ND. BS x 4 normoactive. No hepatosplenomegaly.  MUSCULOSKELETAL : No cyanosis, clubbing, or ischemia  NEUROLOGICAL: Sedated, Moving all ext.    Results from last 7 days   Lab Units 02/06/24 0330 02/05/24 2124 02/05/24 2123   WBC 10*3/mm3 12.97* 7.63  --    HEMOGLOBIN g/dL 10.4* 11.6*  --    HEMOGLOBIN, POC g/dL  --   --  11.2*   PLATELETS 10*3/mm3 269 334  --      Results from last 7 days   Lab Units 02/06/24 0331 02/05/24 2124 02/05/24 2123   SODIUM mmol/L 142 138  --    POTASSIUM mmol/L 3.9 3.7  --    CO2 mmol/L 20.0* 26.0  --    BUN mg/dL 37* 35*  --    CREATININE mg/dL 1.64* 2.06* 2.50*   MAGNESIUM mg/dL 1.9  --   --    PHOSPHORUS mg/dL 3.4  --   --    GLUCOSE mg/dL 549* 350*  --      Estimated Creatinine Clearance: 37.4 mL/min (A) (by C-G formula based on SCr of 1.64 mg/dL (H)).    Results from last 7 days   Lab Units 02/07/24  0353   PH, ARTERIAL pH units 7.468*   PCO2, ARTERIAL mm Hg 25.0*   PO2 ART mm Hg 48.7*       I reviewed the patient's new clinical results.  I reviewed the patient's new imaging results/reports including actual images and agree with reports.       Imaging Results (Last 24 Hours)       Procedure Component Value Units Date/Time    XR Abdomen KUB [[id number]] Collected: 02/07/24 0821     Updated: 02/07/24 0826    Narrative:      XR ABDOMEN KUB    Date of Exam: 2/7/2024 4:53 AM EST    Indication: sbft post-pyloric goal    Comparison:  2/6/2024.    Findings:  Enteric tube tip is in the region of the pylorus or duodenal bulb. No bowel dilatation is present. Gastric stimulator device is again noted.      Impression:      Impression:  Feeding tube tip in the pylorus or duodenal bulb.      Electronically Signed: Kerri Peters MD    2/7/2024 8:23 AM EST    Workstation ID: [id number]    XR Chest 1 View [[id number]] Collected: 02/07/24 0758     Updated: 02/07/24 0817    Narrative:      XR CHEST 1 VW    Date of Exam: 2/7/2024 4:52 AM EST    Indication: Intubated Patient    Comparison: 2/6/2024    Findings:  ET tube in satisfactory position. NG tube courses through the thorax. There are increasing multifocal airspace opacities, right greater than left with an increasing right effusion. Right upper extremity PICC line in satisfactory position.      Impression:      Impression:    1. Increasing multifocal, right greater than left opacities and increasing right effusion. Favor multifocal pneumonia.  2. Support apparatus in satisfactory position.      Electronically Signed: Skip Valente MD    2/7/2024 8:13 AM EST    Workstation ID: [id number]    CT Head Without Contrast [[id number]] Collected: 02/07/24 0205     Updated: 02/07/24 0213    Narrative:      CT HEAD WO CONTRAST    Date of Exam: 2/7/2024 12:56 AM EST    Indication: Stroke, follow up.    Comparison: CT scan of the head from February 6, 2024 at 1655 hours    Technique: Axial CT images were obtained of the head without contrast administration.  Automated exposure control and iterative construction methods were used.      Findings:  There is an evolving right middle cerebral artery infarct with diffuse low-attenuation throughout the MCA distribution. Hemorrhagic conversion is noted within the anterior aspect of the right temporal lobe. There is surrounding diffuse cerebral edema.   There is no significant midline shift. The ventricles are not significantly effaced. A low-attenuation area in the right  cerebellum has developed in the interval. A new focus of infarct is not excluded. Chronic microvascular ischemic changes stable.      Impression:      Impression:  Evolving right middle cerebral artery distribution infarct with hemorrhagic conversion in the anterior aspect of the right temporal lobe. The overall appearance of the hemorrhagic component is unchanged and there is no evidence of new edema or mass   effect. There may be a new developing right cerebellar subacute infarct.        Electronically Signed: Berhane Cano MD    2/7/2024 2:09 AM EST    Workstation ID: [id number]    XR Abdomen KUB [[id number]] Collected: 02/06/24 1902     Updated: 02/06/24 1906    Narrative:      XR ABDOMEN KUB    Date of Exam: 2/6/2024 5:38 PM CST    Indication: kiofeed placement    Comparison: None available.    Findings:  There is a gastric tube with tip in the peripyloric region. No evidence of intestinal obstruction. No acute osseous process identified. No abnormal calcifications are identified.  Moderate fecal load.      Impression:      Impression:  Enteric tube tip in the peripyloric region.      Electronically Signed: Ramón Archibald MD    2/6/2024 6:02 PM CST    Workstation ID: [id number]    XR Chest 1 View [[id number]] Collected: 02/06/24 1856     Updated: 02/06/24 1902    Narrative:      XR CHEST 1 VW    Date of Exam: 2/6/2024 5:38 PM CST    Indication: intubation    Comparison: 2/5/2024    Findings:  Cardiomediastinal silhouette is unremarkable. There is an endotracheal tube with tip approximately 2 cm from the jamel. There is an enteric tube with the most distal visualized portion overlying the proximal stomach. There is a right-sided PICC with tip   at the cavoatrial junction there is right lower lung airspace disease. No pneumothorax nor significant effusion. No acute osseous abnormality identified.      Impression:      Impression:  1.Support lines and tubes as detailed above.  2.Right lower lobe airspace  disease.      Electronically Signed: Ramón Archibald MD    2/6/2024 5:59 PM CST    Workstation ID: [id number]    CT Head Without Contrast [[id number]] Collected: 02/06/24 1658     Updated: 02/06/24 1707    Narrative:      CT HEAD WO CONTRAST    Date of Exam: 2/6/2024 4:50 PM EST    Indication: s/p thrombectomy neuro decline  Worsening stroke symptoms s/p thrombectomy.    Comparison: 2/5/2024    Technique: Axial CT images were obtained of the head without contrast administration.  Automated exposure control and iterative construction methods were used.    Findings: Hemorrhagic conversion right sided infarct with intraparenchymal hemorrhage within the right temporal lobe and large infarct of the right posterior frontal and right parietal lobes. Surrounding localized mass effect. No intraventricular   extension. Extensive chronic small vessel/microangiopathic ischemic changes. The posterior fossa appears normal. Sellar and suprasellar structures are normal.    Orbital and periorbital soft tissues are normal. The paranasal sinuses, ethmoid air cells, and mastoid air cells are aerated. The bony calvarium appears intact. No acute fractures. No lytic or blastic bony diseases.      Impression:      Impression: Right MCA territory infarct with hemorrhagic conversion into the right temporal lobe. Localized surrounding mass effect..    As of 5:01 p.m. on 2/6/2024 the care team is aware.        Electronically Signed: Souleymane oBone MD    2/6/2024 5:04 PM EST    Workstation ID: [id number]    XR Abdomen KUB [[id number]] Collected: 02/06/24 1504     Updated: 02/06/24 1508    Narrative:      XR ABDOMEN KUB    Date of Exam: 2/6/2024 2:56 PM EST    Indication: ng tube placement    Comparison: None available.    Findings:  Single portable supine view. There is an NG tube with its tip in the distal gastric antrum or pylorus. No bowel dilatation is seen.      Impression:      Impression:  NG tube tip in the distal stomach.      Electronically  Signed: Kerri Peters MD    2/6/2024 3:05 PM EST    Workstation ID: [id number]            Assessment & Plan   Impression        Right MCA CVA    Diabetic peripheral neuropathy    Hyperlipidemia    Hypertension    Heart valve disease    Uncontrolled type 1 diabetes mellitus with hyperglycemia    Gastroparesis    PFO (patent foramen ovale)    Atrial fibrillation and flutter    Acute-on-CKD (chronic kidney disease) stage 3, GFR 30-59 ml/min    Nontraumatic hemorrhage of right cerebral hemisphere    Acute respiratory failure with hypoxia    Aspiration pneumonia of right lower lobe due to vomit       Plan        Ms. Michael is a 64yo F with a history of T1DM (insulin pump), GERD, TIA, HLD, HTN and Afib on Xarelto who presented to the [facility] ED via EMS on 2/5/24 with left sided weakness, facial droop, and slurred speech. Imaging showed a M2 occlusion of the proximal right MCA.  She was not a candidate for TNK secondary to anticoagulation. She was taken to the cath lab by Dr. Coronado where she underwent mechanical thrombectomy of the right MCA.  Acute decompensation occurred on 2/6/2024 with right MCA having a hemorrhagic conversion, worsening mental status and eventually aspiration event leading to intubation and mechanical ventilation.     -Continue mechanical ventilation we will wean to saturation greater than 88%  -Propofol for sedation  -Start ceftriaxone for aspiration pneumonia  -Stroke management per neurology  -Blood sugars improved today, will transition insulin drip to subcu insulin, goal blood glucose less than 180  -Goal blood pressure with systolic less than 140 can utilize nicardipine if needed.  -Aspirin/statin  -Holding anticoagulation due to hemorrhagic conversion  -Start tube feeds  -SCDs for DVT prophylaxis  -A.m. labs     I conducted multidisciplinary rounds in the plan of care was discussed with the multidisciplinary team at that time. In attendance at multidisciplinary rounds was clinical pharmacist,  dietitian, nursing staff, and case management.    I discussed the patient's findings and my recommendations with family and nursing staff     Critical Care time spent in direct patient care: 35 minutes (excluding procedure time, if applicable) including high complexity decision making to assess, manipulate, and support vital organ system failure in this individual who has impairment of one or more vital organ systems such that there is a high probability of imminent or life threatening deterioration in the patient's condition.      Trey Norton, DO  Pulmonary, Critical care and Sleep Medicine

## 2024-02-08 NOTE — PROGRESS NOTES
Multidisciplinary Rounds EN Review Note    Patient Name: Thelma Michael  Date of Encounter: 24 12:27 EST  MRN: [id number]  Admission date: 2024    Reason for visit: EN review . RD to continue to follow per protocol.     EMR reviewed   Medication reviewed- propofol @ 8.16 ml/hr, 3% NS @ 20 ml/hr  Labs reviewed- Na 148, BG >300, Creatinine 1.81, BUN 42    Estimated/Assessed Needs (24):      Energy: 1600 kcal  Protein: 72 g (1.1 g/kg with current renal fx)  Fluids: per clinical status    Additional Information Obtained:   Pt tolerating EN initiated yesterday and advancing to goal. No further emesis. BG elevated as expected with poorly controlled DM, may require insulin changes. Pt started on hypertonic saline infusion. No significant neuro changes and remains intubated. Continues on low dose propofol. No pressors.     Current diet: NPO Diet NPO Type: Strict NPO    EN:  Vital 1.5  Goal Rate: 45 ml/hr    Water Flushes: 30 ml q 4 hr  Modular: None  Route: NG  Tube: Unknown    At goal over: 20Hrs/day     Rx will supply:   Goal Volume 900 mL/day       Flush Volume 150 mL/day       Energy 1350 Kcal/day 84 % Est Need   Protein 61 g/day 85 % Est Need   Fiber 5 g/day       Water in   mL       Total Water 834 mL       Meet DRI No         *+ ~215 kcal from propofol = 1565 (98% est. needs)  --------------------------------------------------------------------------  Product/Rate verified at bedside: Yes  Infusing Rate at time of visit: 35 ml/hr    Average Delivery from [facility] Day: (while advancing)  Volume 229 mL/day   % Goal Vol.   Flush Volume 60 mL/day     Energy  Kcal/day  % Est Need   Protein  g/day  % Est Need   Fiber  g/day     Water in  EN  mL     Total Water  mL     Meet DRI No            Intervention:  Follow treatment plan  Care plan reviewed    Continue advancing EN to goal per order / as tolerated    plan to advance to better meet needs as propofol weaned. If unable to wean, will add  prosource once tolerating at goal pending improvement renal fx.       Follow up:   Per protocol      Kylah Gonzalez RD, [facility]  12:41 EST  Time: 15min

## 2024-02-08 NOTE — CASE MANAGEMENT/SOCIAL WORK
Continued Stay Note  [facility]     Patient Name: Thelma Michael  MRN: [id number]  Today's Date: 2/8/2024    Admit Date: 2/5/2024    Plan: Ongoing   Discharge Plan       Row Name 02/08/24 1228       Plan    Plan Ongoing    Plan Comments Discussed patient in MDR.  Patient remains on mechanical ventilation.  Discharge plan is ongoing.  CM will continue to follow.                   Discharge Codes    No documentation.                       Ashlee Zavala RN

## 2024-02-08 NOTE — PROGRESS NOTES
Intensive Care Follow-up     Hospital:  LOS: 3 days   Ms. Thelma Michael, 63 y.o. female is followed for:   CVA (cerebral vascular accident)            History of present illness:   Thelma Michael is a 63 y.o. female who has a PMHX of T1DM, GERD, TIAs, HLD, HTN, & AF for which she is on chronic anticoagulation (Xarelto).     Patient presented to ED via EMS w/ left sided weakness, facial droop, & slurred speech originating @ ~ 1940.  NIHSS 7.  Neuroimaging showed M2 occlusion of proximal R MCA.  Not a TNK candidate given anticoagulant use so was taken for catheter based intervention by Dr. Coronado who performed mechanical thrombectomy of a right MCA occlusion.      She is admitted to the ICU after the procedure.       Subjective   Interval History:  Patient stable this morning.  Remains on mechanical ventilation.  Remains on 70% FiO2 and 10 of PEEP.             The patient's past medical, surgical and social history were reviewed and updated in Epic as appropriate.       Objective     Infusions:  esmolol,  mcg/kg/min, Last Rate: 25 mcg/kg/min (02/06/24 2024)  niCARdipine, 5-15 mg/hr, Last Rate: 5 mg/hr (02/08/24 1237)  propofol, 5-50 mcg/kg/min, Last Rate: 20 mcg/kg/min (02/08/24 1237)  sodium chloride, 20 mL/hr, Last Rate: 20 mL/hr (02/08/24 0750)      Medications:  atorvastatin, 80 mg, Oral, Nightly  cefTRIAXone, 1,000 mg, Intravenous, Q24H  chlorhexidine, 15 mL, Mouth/Throat, Q12H  insulin detemir, 10 Units, Subcutaneous, Daily  insulin regular, 3-14 Units, Subcutaneous, Q6H  pantoprazole, 40 mg, Intravenous, Q24H  prochlorperazine, 2.5 mg, Intravenous, Once  senna-docusate sodium, 2 tablet, Oral, BID      I reviewed the patient's medications.    Vital Sign Min/Max for last 24 hours  Temp  Min: 99.9 °F (37.7 °C)  Max: 101.8 °F (38.8 °C)   BP  Min: 85/50  Max: 155/67   Pulse  Min: 83  Max: 112   Resp  Min: 25  Max: 34   SpO2  Min: 86 %  Max: 99 %   No data recorded       Input/Output for last 24  hour shift  02/07 0701 - 02/08 0700  In: 1101.5 [I.V.:642.5]  Out: 1020 [Urine:1020]   Mode: VC+/AC  FiO2 (%):  [70 %-100 %] 70 %  S RR:  [16] 16  S VT:  [400 mL] 400 mL  PEEP/CPAP (cm H2O):  [8 cm H20-10 cm H20] 10 cm H20  MAP (cm H2O):  [12-19] 12  GENERAL : Sedated, lying in bed, NAD  RESPIRATORY/THORAX : ET tube in place, Coarse breath sounds bilaterally  CARDIOVASCULAR : Normal S1/S2, RRR. 1+ lower ext edema.  GASTROINTESTINAL : Soft, NT/ND. BS x 4 normoactive. No hepatosplenomegaly.  MUSCULOSKELETAL : No cyanosis, clubbing, or ischemia  NEUROLOGICAL: Sedated    Results from last 7 days   Lab Units 02/08/24  0257 02/06/24  0330 02/05/24  2124   WBC 10*3/mm3 8.90 12.97* 7.63   HEMOGLOBIN g/dL 10.0* 10.4* 11.6*   PLATELETS 10*3/mm3 192 269 334     Results from last 7 days   Lab Units 02/08/24  1204 02/08/24  0725 02/08/24  0257 02/06/24  0331   SODIUM mmol/L 148* 148* 147* 142   POTASSIUM mmol/L  --  3.9 3.9 3.9   CO2 mmol/L  --  16.0* 16.0* 20.0*   BUN mg/dL  --  42* 44* 37*   CREATININE mg/dL  --  1.81* 1.87* 1.64*   MAGNESIUM mg/dL  --   --  1.6 1.9   PHOSPHORUS mg/dL  --   --  3.6 3.4   GLUCOSE mg/dL  --  266* 250* 549*     Estimated Creatinine Clearance: 33.6 mL/min (A) (by C-G formula based on SCr of 1.81 mg/dL (H)).    Results from last 7 days   Lab Units 02/08/24  0359   PH, ARTERIAL pH units 7.408   PCO2, ARTERIAL mm Hg 28.5*   PO2 ART mm Hg 73.2*       I reviewed the patient's new clinical results.  I reviewed the patient's new imaging results/reports including actual images and agree with reports.       Imaging Results (Last 24 Hours)       Procedure Component Value Units Date/Time    XR Chest 1 View [[id number]] Collected: 02/08/24 0812     Updated: 02/08/24 0828    Narrative:      XR CHEST 1 VW    Date of Exam: 2/8/2024 1:05 AM EST    Indication: Intubated Patient    Comparison: 1 day prior.    Findings:  Support hardware projects unchanged. There is persistent, unchanged dense airspace disease on the  right concerning for pneumonia. No new focal airspace disease is present elsewhere. There is no effusion or pneumothorax. Unchanged heart and mediastinal   contours.      Impression:      Impression:  Support hardware projects unchanged. There is persistent, unchanged dense airspace disease on the right concerning for pneumonia. No new focal airspace disease is present elsewhere. There is no effusion or pneumothorax. Unchanged heart and mediastinal   contours.      Electronically Signed: Arya Muñoz MD    2/8/2024 8:25 AM EST    Workstation ID: [id number]    CT Head Without Contrast [[id number]] Collected: 02/08/24 0358     Updated: 02/08/24 0415    Narrative:      CT HEAD WO CONTRAST    Date of Exam: 2/8/2024 3:37 AM EST    Indication: Stroke, follow up.    Comparison: CT the head dated February 7, 2024    Technique: Axial CT images were obtained of the head without contrast administration.  Automated exposure control and iterative construction methods were used.    Findings:  There is an evolving right middle cerebral artery infarct with diffuse low-attenuation throughout the distribution of the MCA. Hemorrhagic conversion is noted within the anterior aspect of the right temporal lobe. The degree of surrounding vasogenic   edema has increased. Mass effect on the anterior aspect of the anterior horn of the right lateral ventricle. Similarly, there is effacement of the posterior horn the right lateral ventricle. Left to right midline shift has increased from about 4 mm to 7   mm. The low-density area of the right cerebellum is more pronounced consistent with evolving right cerebellar infarct. Diffuse ethmoid sinus disease is stable.      Impression:      Impression:  1.Evolving right middle cerebral artery infarct with hemorrhagic conversion of the anterior aspect of the right temporal lobe. There is increasing vasogenic edema and mass effect with increasing left to right midline shift from 4 mm to 7 mm.  2.Evolving  right cerebellar infarct.        Electronically Signed: Berhane Cano MD    2/8/2024 4:11 AM EST    Workstation ID: [id number]    XR Chest 1 View [[id number]] Collected: 02/07/24 2115     Updated: 02/07/24 2124    Narrative:      XR CHEST 1 VW    Date of Exam: 2/7/2024 7:47 PM CST    Indication: worsening hypoxia    Comparison: 2/7/2024 at 5:22 a.m.    Findings:  Cardiomediastinal silhouette and support lines and tubes are unchanged. Improving aeration of the right lung with persistent right mid to lower lung airspace disease. No superimposed airspace disease no pneumothorax. There is a small right pleural   effusion. No acute osseous abnormality identified.      Impression:      Impression:  Improving aeration of the right lung.      Electronically Signed: Ramón Archibald MD    2/7/2024 8:21 PM CST    Workstation ID: [id number]            Assessment & Plan   Impression        Right MCA CVA    Diabetic peripheral neuropathy    Hyperlipidemia    Hypertension    Heart valve disease    Uncontrolled type 1 diabetes mellitus with hyperglycemia    Gastroparesis    PFO (patent foramen ovale)    Atrial fibrillation and flutter    Acute-on-CKD (chronic kidney disease) stage 3, GFR 30-59 ml/min    Nontraumatic hemorrhage of right cerebral hemisphere    Acute respiratory failure with hypoxia    Aspiration pneumonia of right lower lobe due to vomit       Plan        Ms. Michael is a 62yo F with a history of T1DM (insulin pump), GERD, TIA, HLD, HTN and Afib on Xarelto who presented to the [facility] ED via EMS on 2/5/24 with left sided weakness, facial droop, and slurred speech. Imaging showed a M2 occlusion of the proximal right MCA.  She was not a candidate for TNK secondary to anticoagulation. She was taken to the cath lab by Dr. Coronado where she underwent mechanical thrombectomy of the right MCA.  Acute decompensation occurred on 2/6/2024 with right MCA having a hemorrhagic conversion, worsening mental status and eventually aspiration event  leading to intubation and mechanical ventilation.     -Continue mechanical ventilation we will wean to saturation greater than 88%  -Propofol for sedation  -Continue ceftriaxone for aspiration pneumonia  -Stroke management per neurology, started on hypertonic saline today  -Continue subcu insulin with a goal blood glucose less than 180  -Goal blood pressure with systolic less than 140 can utilize nicardipine if needed.  -Continue statin  -Holding anticoagulation due to hemorrhagic conversion  -Continue tube feeds  -SCDs for DVT prophylaxis  -A.m. labs     I conducted multidisciplinary rounds in the plan of care was discussed with the multidisciplinary team at that time. In attendance at multidisciplinary rounds was clinical pharmacist, dietitian, nursing staff, and case management.    I discussed the patient's findings and my recommendations with family and nursing staff     Critical Care time spent in direct patient care: 35 minutes (excluding procedure time, if applicable) including high complexity decision making to assess, manipulate, and support vital organ system failure in this individual who has impairment of one or more vital organ systems such that there is a high probability of imminent or life threatening deterioration in the patient's condition.      Trey Norton, DO  Pulmonary, Critical care and Sleep Medicine

## 2024-02-08 NOTE — PLAN OF CARE
Goal Outcome Evaluation: NIH 27. Does not follow commands. Withdrawals to physical stimuli. Cardene infusing to maintain SBP <140 mmHg.     Problem: Adult Inpatient Plan of Care  Goal: Absence of Hospital-Acquired Illness or Injury  Intervention: Identify and Manage Fall Risk  Recent Flowsheet Documentation  Taken 2/8/2024 1800 by Manjinder Huerta RN  Safety Promotion/Fall Prevention:   safety round/check completed   activity supervised  Taken 2/8/2024 1600 by Manjinder Huerta RN  Safety Promotion/Fall Prevention:   safety round/check completed   activity supervised  Taken 2/8/2024 1400 by Manjinder Huerta RN  Safety Promotion/Fall Prevention:   safety round/check completed   activity supervised  Taken 2/8/2024 1200 by Manjinder Huerta RN  Safety Promotion/Fall Prevention:   safety round/check completed   activity supervised  Taken 2/8/2024 1000 by Manjinder Huerta RN  Safety Promotion/Fall Prevention:   safety round/check completed   activity supervised  Taken 2/8/2024 0800 by Manjinder Huerta RN  Safety Promotion/Fall Prevention:   safety round/check completed   activity supervised     Problem: Adult Inpatient Plan of Care  Goal: Absence of Hospital-Acquired Illness or Injury  Intervention: Prevent Skin Injury  Recent Flowsheet Documentation  Taken 2/8/2024 1800 by Manjinder Huerta RN  Body Position:   weight shifting   neutral body alignment  Taken 2/8/2024 1600 by Manjinder Huerta RN  Body Position:   weight shifting   right  Skin Protection:   tubing/devices free from skin contact   skin-to-skin areas padded   incontinence pads utilized  Taken 2/8/2024 1400 by Manjinder Huerta RN  Body Position:   weight shifting   left  Taken 2/8/2024 1200 by Manjinder Huerta RN  Body Position:   weight shifting   right  Skin Protection:   tubing/devices free from skin contact   incontinence pads utilized  Taken 2/8/2024 1000 by Manjinder Huerta RN  Body Position:   weight shifting   left  Taken 2/8/2024 0800 by Bradley  SUKH Dunbar  Body Position:   weight shifting   right  Skin Protection:   tubing/devices free from skin contact   skin-to-skin areas padded   skin-to-device areas padded   incontinence pads utilized     Problem: Restraint, Nonviolent  Goal: Absence of Harm or Injury  Intervention: Implement Least Restrictive Safety Strategies  Recent Flowsheet Documentation  Taken 2/8/2024 1800 by Manjinder Huerta RN  Medical Device Protection: tubing secured  Taken 2/8/2024 1600 by Manjinder Huerta RN  Medical Device Protection: tubing secured  Taken 2/8/2024 1400 by Manjinder Huerta RN  Medical Device Protection: tubing secured  Taken 2/8/2024 1200 by Manjinder Huerta, RN  Medical Device Protection: tubing secured  Taken 2/8/2024 1000 by Manjinder Huerta RN  Medical Device Protection: tubing secured  Taken 2/8/2024 0800 by Manjinder Huerta RN  Medical Device Protection: tubing secured  Less Restrictive Alternative: calming techniques promoted  De-Escalation Techniques: reoriented

## 2024-02-08 NOTE — PROGRESS NOTES
Neurology Note    Patient:  Thelma Michael    YOB: 1960    REFERRING PHYSICIAN:  Dr. Norton    CHIEF COMPLAINT:    stroke    HISTORY OF PRESENT ILLNESS:   The patient has been poorly responsive when sedation is topped, withdraws right side some, no seizures noted, currently on propofol.    Past Medical History:  Past Medical History:   Diagnosis Date    Acid reflux     Acute bronchitis     Cardiac murmur     Depression with anxiety 10/05/2020    Diabetes mellitus     Diabetes mellitus type I     Gastroesophageal reflux disease 05/13/2016    H/O echocardiogram 08/07/2012    i. LVEF 65%.ii. Mild LVH.iii. Borderline evidence of atrial septal aneurysm.  No PFO.     History of nuclear stress test 08/22/2014    Negative for ischemia and scars; LVEF 77%.      History of TIAs 07/15/2021    Hyperlipidemia     Hypertension     Impacted cerumen of both ears     Migraine     Migraines 10/31/2019    Self-catheterizes urinary bladder     Sinusitis     Stroke     Tobacco abuse     quit 4 days ago.      Urticaria     Vitamin D deficiency 05/13/2016       Past Surgical History:  Past Surgical History:   Procedure Laterality Date    CAPSULE ENDOSCOPY  07/27/2021    Procedure: PILLCAM DEPLOYMENT;  Surgeon: Mikael Worthy MD;  Location:  [facility];  Service: Gastroenterology;;    COLONOSCOPY      COLONOSCOPY N/A 07/27/2021    Procedure: COLONOSCOPY;  Surgeon: Mikael Worthy MD;  Location:  [facility];  Service: Gastroenterology;  Laterality: N/A;    DENTAL PROCEDURE      ENDOSCOPY N/A 06/30/2021    Procedure: ESOPHAGOGASTRODUODENOSCOPY;  Surgeon: Brunner, Mark I, MD;  Location:  [facility];  Service: Gastroenterology;  Laterality: N/A;    ENDOSCOPY N/A 07/27/2021    Procedure: ESOPHAGOGASTRODUODENOSCOPY;  Surgeon: Mikael Worthy MD;  Location:  [facility];  Service: Gastroenterology;  Laterality: N/A;    ENDOSCOPY WITH JTUBE N/A 03/16/2022    Procedure:  ESOPHAGOGASTRODUODENOSCOPY WITH JEJUNAL TUBE INSERTION;  Surgeon: Thom Glover MD;  Location:  [facility];  Service: Gastroenterology;  Laterality: N/A;    GASTRIC STIMULATOR IMPLANT SURGERY N/A     For gastropareisis    INTERVENTIONAL RADIOLOGY PROCEDURE N/A 2024    Procedure: IR mechanical thrombectomy;  Surgeon: Rambo Coronado MD;  Location:  [facility];  Service: Interventional Radiology;  Laterality: N/A;    UPPER GASTROINTESTINAL ENDOSCOPY         Social History:   Social History     Socioeconomic History    Marital status:    Tobacco Use    Smoking status: Former     Packs/day: 0.25     Years: 30.00     Additional pack years: 0.00     Total pack years: 7.50     Types: Cigarettes     Quit date: 2019     Years since quittin.7    Smokeless tobacco: Never    Tobacco comments:     BC PL never smoker    Vaping Use    Vaping Use: Never used   Substance and Sexual Activity    Alcohol use: Yes     Alcohol/week: 1.0 standard drink of alcohol     Types: 1 Glasses of wine per week     Comment: social    Drug use: Not Currently     Frequency: 2.0 times per week     Types: Marijuana    Sexual activity: Not Currently     Comment: Single         Family History:   Family History   Problem Relation Age of Onset    Anxiety disorder Other     Arthritis Other     ADD / ADHD Other     Heart disease Other         cardiac disorder    Depression Other     Diabetes Other     Hyperlipidemia Other     Hypertension Other     Lung cancer Other     Osteoporosis Other     Hypertension Brother     Diabetes Brother     Hyperlipidemia Mother     Hypertension Mother     Colon cancer Neg Hx        Medications Prior to Admission:    Prior to Admission medications    Medication Sig Start Date End Date Taking? Authorizing Provider   acetaminophen (TYLENOL) 325 MG tablet Take 2 tablets by mouth Every 4 (Four) Hours As Needed for Mild Pain . 21   Marta Rodriguez PA-C   albuterol sulfate HFA  108 (90 Base) MCG/ACT inhaler Inhale 2 puffs Every 4 (Four) Hours As Needed for Wheezing. 1/5/23   Jordan Millan APRN   amLODIPine (NORVASC) 10 MG tablet Take 1 tablet by mouth Daily. 5/5/23   Rajat Akers APRN   atorvastatin (Lipitor) 80 MG tablet Take 1 tablet by mouth Daily. 10/19/23   Monet Howe APRN   Blood Glucose Monitoring Suppl (FreeStyle Lite) w/Device kit 1 Device 3 (Three) Times a Day. 8/4/23   Monet Howe APRN   calcium carbonate (TUMS) 500 MG chewable tablet Chew 1,000 mg 3 (Three) Times a Day As Needed for Indigestion or Heartburn. 5/6/21   Marta Rodriguez PA-C   carvedilol (COREG) 12.5 MG tablet Take 1 tablet by mouth 2 (Two) Times a Day With Meals. 9/18/23   Delia Triplett DO   Continuous Blood Gluc Transmit (Dexcom G6 Transmitter) misc 1 each by Other route Every 3 (Three) Months. 1/9/24   Gerson Ochoa MD   famotidine (PEPCID) 20 MG tablet Take 1 tablet by mouth Every Morning. 9/19/23   Delia Triplett DO   ferrous sulfate 325 (65 Fe) MG tablet Take with Vitamin C or Juice  Patient taking differently: Daily With Breakfast. Take with Vitamin C or Juice 7/11/23   Monet Howe APRN   gabapentin (NEURONTIN) 100 MG capsule Take 1 capsule by mouth 3 (Three) Times a Day As Needed (back pain and neuropathy). 10/19/23   Monet Howe APRN   glucose blood test strip Check blood sugar 5 times daily as needed. 6/26/23   Monet Howe APRN   Insulin Infusion Pump (T:slim X2 Insulin Pump) device Use.    Provider, MD Reddy   Insulin Lispro (humaLOG) 100 UNIT/ML injection Inject 2-7 Units under the skin into the appropriate area as directed 4 (Four) Times a Day Before Meals & at Bedtime. 1/9/24   Gerson Ochoa MD   lactobacillus acidophilus (RISAQUAD) capsule capsule Take 1 capsule by mouth Daily. 5/5/23   Rajat Akers APRN   lidocaine (LIDODERM) 5 % Place 1 patch on the skin as directed by  provider Daily. No more than 1 patch per 24 hours. 1/25/24   Monet Howe APRN   losartan (COZAAR) 100 MG tablet Take 1 tablet by mouth Daily. 1/19/24   Monet Howe APRN   melatonin 5 MG tablet tablet Take 1 tablet by mouth At Night As Needed (sleep). 5/6/21   Marta Rodriguez PA-C   mirtazapine (REMERON) 7.5 MG tablet Take 1 tablet by mouth Every Night. 6/26/23   Monet Howe APRN   Multivitamin tablet tablet Take 1 tablet by mouth Daily. 8/16/23   Monet Howe APRN   prochlorperazine (COMPAZINE) 5 MG tablet Take 1 tablet by mouth Every 6 (Six) Hours As Needed for Nausea or Vomiting. 9/18/23   Delia Triplett DO   rivaroxaban (XARELTO) 15 MG tablet Take 1 tablet by mouth Daily With Dinner 6/13/23   Krishna Jean MD   terazosin (HYTRIN) 1 MG capsule Take 1 capsule by mouth Every Night. 8/16/23   Monet Howe APRN   traMADol (ULTRAM) 50 MG tablet Take 1 tablet by mouth 2 (Two) Times a Day As Needed for Moderate Pain. 11/9/23   Monet Howe APRN   vitamin D (ERGOCALCIFEROL) 1.25 MG (81451 UT) capsule capsule Take 1 capsule by mouth 1 (One) Time Per Week. 7/11/23   Monet Howe APRN   Insulin Glargine (BASAGLAR KWIKPEN) 100 UNIT/ML injection pen Inject 10 Units under the skin into the appropriate area as directed Every Night. 3/19/22 3/19/22  Kath Rosado MD       Allergies:  Patient has no known allergies.      Review of system  Review of Systems   Unable to perform ROS: Intubated       Vitals:    02/08/24 1100   BP: 131/63   Pulse: 100   Resp:    Temp:    SpO2: 95%       Physical exam  Physical Exam  Constitutional:       Interventions: She is intubated.   Eyes:      Extraocular Movements: Extraocular movements intact.      Pupils: Pupils are equal, round, and reactive to light.   Cardiovascular:      Rate and Rhythm: Normal rate and regular rhythm.   Pulmonary:      Effort: She is intubated.   Neurological:       Deep Tendon Reflexes: Babinski sign absent on the right side. Babinski sign absent on the left side.      Comments: Eyes closed, no response to voice, touch. Winces and withdraws right side some. Decerebrate to pain on the left.           Lab Results   Component Value Date    WBC 8.90 02/08/2024    HGB 10.0 (L) 02/08/2024    HCT 29.5 (L) 02/08/2024    MCV 89.4 02/08/2024     02/08/2024     Lab Results   Component Value Date    GLUCOSE 266 (H) 02/08/2024    BUN 42 (H) 02/08/2024    CREATININE 1.81 (H) 02/08/2024    EGFRIFNONA 61 01/06/2015    EGFRIFAFRI 62 02/13/2022    BCR 23.2 02/08/2024    CO2 16.0 (L) 02/08/2024    CALCIUM 7.8 (L) 02/08/2024    PROTENTOTREF 7.6 01/06/2015    ALBUMIN 4.0 02/05/2024    LABIL2 1.6 01/06/2015    AST 13 02/05/2024    ALT 13 02/05/2024     Sodium  136 - 145 mmol/L 148 High   147 High        Osmolality  275 - 295 mOsm/kg 334 High        Radiological Studies:   XR Chest 1 View    Result Date: 2/8/2024  XR CHEST 1 VW Date of Exam: 2/8/2024 1:05 AM EST Indication: Intubated Patient Comparison: 1 day prior. Findings: Support hardware projects unchanged. There is persistent, unchanged dense airspace disease on the right concerning for pneumonia. No new focal airspace disease is present elsewhere. There is no effusion or pneumothorax. Unchanged heart and mediastinal contours.     Impression: Support hardware projects unchanged. There is persistent, unchanged dense airspace disease on the right concerning for pneumonia. No new focal airspace disease is present elsewhere. There is no effusion or pneumothorax. Unchanged heart and mediastinal contours. Electronically Signed: Arya Muñoz MD  2/8/2024 8:25 AM EST  Workstation ID: [id number]    CT Head Without Contrast    Result Date: 2/8/2024  CT HEAD WO CONTRAST Date of Exam: 2/8/2024 3:37 AM EST Indication: Stroke, follow up. Comparison: CT the head dated February 7, 2024 Technique: Axial CT images were obtained of the head without  contrast administration.  Automated exposure control and iterative construction methods were used. Findings: There is an evolving right middle cerebral artery infarct with diffuse low-attenuation throughout the distribution of the MCA. Hemorrhagic conversion is noted within the anterior aspect of the right temporal lobe. The degree of surrounding vasogenic edema has increased. Mass effect on the anterior aspect of the anterior horn of the right lateral ventricle. Similarly, there is effacement of the posterior horn the right lateral ventricle. Left to right midline shift has increased from about 4 mm to 7 mm. The low-density area of the right cerebellum is more pronounced consistent with evolving right cerebellar infarct. Diffuse ethmoid sinus disease is stable.     Impression: 1.Evolving right middle cerebral artery infarct with hemorrhagic conversion of the anterior aspect of the right temporal lobe. There is increasing vasogenic edema and mass effect with increasing left to right midline shift from 4 mm to 7 mm. 2.Evolving right cerebellar infarct. Electronically Signed: Berhane Cano MD  2/8/2024 4:11 AM EST  Workstation ID: [id number]    XR Chest 1 View    Result Date: 2/7/2024  XR CHEST 1 VW Date of Exam: 2/7/2024 7:47 PM CST Indication: worsening hypoxia Comparison: 2/7/2024 at 5:22 a.m. Findings: Cardiomediastinal silhouette and support lines and tubes are unchanged. Improving aeration of the right lung with persistent right mid to lower lung airspace disease. No superimposed airspace disease no pneumothorax. There is a small right pleural effusion. No acute osseous abnormality identified.     Impression: Improving aeration of the right lung. Electronically Signed: Ramón Archibald MD  2/7/2024 8:21 PM CST  Workstation ID: [id number]    Cardiac Catheterization/Vascular Study    Result Date: 2/7/2024  Clinical Indication: 63-year-old female who presented to [facility] with right MCA syndrome and LVO  "stroke.  Her symptoms did improve in the ER, but this was with systolic BPs in the 230-240 range.  CT angiogram/CT perfusion scans demonstrated acute occlusion of a dominant M2 branch of the right middle cerebral artery, with ischemic \"penumbra\".  Given her high likelihood for neurologic deterioration with normalization of blood pressure, she is offered mechanical thrombectomy for her MCA occlusion. : Dr. Rambo Coronado. Access: Right common femoral artery. Estimated blood loss: 20 mL Complication: None apparent. Conscious sedation: Moderate sedation was provided by me for a total of 43 minutes.  Physical vitals were continuously monitored by an independently trained observer.  A total of 3 mg of Versed and 75 ug of fentanyl were administered intravenously. Procedures: 1.  Mechanical thrombectomy (Solitaire stent retriever) right middle cerebral artery (MCA) 2.  Placement of an arterial closure device. 3.  Conscious sedation. Technique: The patient was brought emergently to the cardiac catheterization suite.  The right groin region was prepped and draped in the usual, sterile fashion.  Local anesthesia with 1% lidocaine infiltration was achieved.  Additionally, intravenous fentanyl and Versed were given for patient comfort throughout the procedure, the details of which are documented in the nursing record.  Utilizing micropuncture technique, a 8 German vascular sheath was placed into the right common femoral artery without difficulty.  Subsequently, a 5 German Exagen Diagnosticsenstein catheter was advanced into the aortic arch and used to select the right common carotid and internal carotid arteries under fluoroscopic guidance.  Appropriate angiographic sequences were filled following contrast injection.  Findings: Selective right internal carotid artery catheterization and carotid angiography: The cervical portions of the right carotid vasculature demonstrate mild atherosclerotic plaque formation, at the common carotid " "bifurcation, but without appreciable stenosis utilizing NASCET criteria.  There are no cervical dissections were ulcerative lesions.  No \"culprit\" lesions are identified.  The intracranial circulation was opacified via a right internal carotid injection, demonstrating abrupt occlusion of the dominant M2 branch of the right middle cerebral artery, consistent with an acute thromboembolic event.  This represents TICI 2A flow to the entirety of the right MCA vascular territory.  No additional large vessel occlusions are identified; however, moderate intracranial atherosclerotic disease is noted, but without flow-limiting lesion.  There are no changes of vasculitis or vasospasm.  The dural venous sinuses are patent. The Berenstein catheter was exchanged over a Cooley wire for an 8 [place] FlowGate balloon guide catheter which was positioned in the high cervical right internal carotid artery without difficulty. Under fluoroscopic guidance, a "[employer]om-21 microcatheter was advanced over Transcend EX soft tip microwire into the intracranial circulation and passed the M2 right MCA occlusion without difficulty.  Microcatheter injection confirms appropriate placement within an M2 branch distal to the embolism.  Next, a 3 mm Solitaire stent retriever was advanced through the microcatheter and deployed across the right MCA occlusion without difficulty.  After approximately 3 minute wait period, the Solitaire device and microcatheter were removed utilizing temporary balloon occlusion technique and vigorous aspiration of the balloon guide catheter.  This resulted in successful extraction of a small amount of thrombus.  Follow-up angiography demonstrates restoration of normal flow (TICI 3) to the right MCA vascular territory.  There were no emboli to new vascular territory identified.  No complicating features.  At the end of the procedure, all catheters and sheaths were removed from the groin and hemostasis was achieved at the puncture " "site utilizing manual compression and placement of a 8 [place] Angio-Seal arterial closure device. Impression: Abrupt occlusion of a dominant M2 branch of the right middle cerebral artery, consistent with an acute thromboembolic occlusion.  This responded well to mechanical thrombectomy (Solitaire stent retriever), restoring normal flow (TICI 3) to the right cerebral hemisphere.  There were no emboli to new vascular territory or complicating features.  There was no carotid \"culprit\" lesion, and the aforementioned thromboembolism is concerning for a cardiac thromboembolic source.     XR Abdomen KUB    Result Date: 2/7/2024  XR ABDOMEN KUB Date of Exam: 2/7/2024 4:53 AM EST Indication: sbft post-pyloric goal Comparison: 2/6/2024. Findings: Enteric tube tip is in the region of the pylorus or duodenal bulb. No bowel dilatation is present. Gastric stimulator device is again noted.     Impression: Feeding tube tip in the pylorus or duodenal bulb. Electronically Signed: Kerri Peters MD  2/7/2024 8:23 AM EST  Workstation ID: [id number]    XR Chest 1 View    Result Date: 2/7/2024  XR CHEST 1 VW Date of Exam: 2/7/2024 4:52 AM EST Indication: Intubated Patient Comparison: 2/6/2024 Findings: ET tube in satisfactory position. NG tube courses through the thorax. There are increasing multifocal airspace opacities, right greater than left with an increasing right effusion. Right upper extremity PICC line in satisfactory position.     Impression: 1. Increasing multifocal, right greater than left opacities and increasing right effusion. Favor multifocal pneumonia. 2. Support apparatus in satisfactory position. Electronically Signed: Skip Valente MD  2/7/2024 8:13 AM EST  Workstation ID: [id number]    CT Head Without Contrast    Result Date: 2/7/2024  CT HEAD WO CONTRAST Date of Exam: 2/7/2024 12:56 AM EST Indication: Stroke, follow up. Comparison: CT scan of the head from February 6, 2024 at 1655 hours Technique: Axial CT images were " obtained of the head without contrast administration.  Automated exposure control and iterative construction methods were used. Findings: There is an evolving right middle cerebral artery infarct with diffuse low-attenuation throughout the MCA distribution. Hemorrhagic conversion is noted within the anterior aspect of the right temporal lobe. There is surrounding diffuse cerebral edema. There is no significant midline shift. The ventricles are not significantly effaced. A low-attenuation area in the right cerebellum has developed in the interval. A new focus of infarct is not excluded. Chronic microvascular ischemic changes stable.     Impression: Evolving right middle cerebral artery distribution infarct with hemorrhagic conversion in the anterior aspect of the right temporal lobe. The overall appearance of the hemorrhagic component is unchanged and there is no evidence of new edema or mass effect. There may be a new developing right cerebellar subacute infarct. Electronically Signed: Berhane Cano MD  2/7/2024 2:09 AM EST  Workstation ID: [id number]    XR Abdomen KUB    Result Date: 2/6/2024  XR ABDOMEN KUB Date of Exam: 2/6/2024 5:38 PM CST Indication: kiofeed placement Comparison: None available. Findings: There is a gastric tube with tip in the peripyloric region. No evidence of intestinal obstruction. No acute osseous process identified. No abnormal calcifications are identified.  Moderate fecal load.     Impression: Enteric tube tip in the peripyloric region. Electronically Signed: Ramón Archibald MD  2/6/2024 6:02 PM CST  Workstation ID: [id number]    XR Chest 1 View    Result Date: 2/6/2024  XR CHEST 1 VW Date of Exam: 2/6/2024 5:38 PM CST Indication: intubation Comparison: 2/5/2024 Findings: Cardiomediastinal silhouette is unremarkable. There is an endotracheal tube with tip approximately 2 cm from the jamel. There is an enteric tube with the most distal visualized portion overlying the proximal stomach. There  is a right-sided PICC with tip  at the cavoatrial junction there is right lower lung airspace disease. No pneumothorax nor significant effusion. No acute osseous abnormality identified.     Impression: 1.Support lines and tubes as detailed above. 2.Right lower lobe airspace disease. Electronically Signed: Ramón Archibald MD  2/6/2024 5:59 PM CST  Workstation ID: [id number]    CT Head Without Contrast    Result Date: 2/6/2024  CT HEAD WO CONTRAST Date of Exam: 2/6/2024 4:50 PM EST Indication: s/p thrombectomy neuro decline Worsening stroke symptoms s/p thrombectomy. Comparison: 2/5/2024 Technique: Axial CT images were obtained of the head without contrast administration.  Automated exposure control and iterative construction methods were used. Findings: Hemorrhagic conversion right sided infarct with intraparenchymal hemorrhage within the right temporal lobe and large infarct of the right posterior frontal and right parietal lobes. Surrounding localized mass effect. No intraventricular extension. Extensive chronic small vessel/microangiopathic ischemic changes. The posterior fossa appears normal. Sellar and suprasellar structures are normal. Orbital and periorbital soft tissues are normal. The paranasal sinuses, ethmoid air cells, and mastoid air cells are aerated. The bony calvarium appears intact. No acute fractures. No lytic or blastic bony diseases.     Impression: Right MCA territory infarct with hemorrhagic conversion into the right temporal lobe. Localized surrounding mass effect.. As of 5:01 p.m. on 2/6/2024 the care team is aware. Electronically Signed: Souleymane Boone MD  2/6/2024 5:04 PM EST  Workstation ID: [id number]    Adult Transthoracic Echo Complete W/ Cont if Necessary Per Protocol (With Agitated Saline)    Result Date: 2/6/2024    Left ventricular systolic function is normal. Left ventricular ejection fraction appears to be 61 - 65%.   Left ventricular wall thickness is consistent with moderate concentric  hypertrophy.   Left atrial volume is mildly increased.   Saline test results are negative.     EEG    Result Date: 2/6/2024  Reason for referral: 63 y.o.female with left-sided weakness, speech changes okay Technical Summary:  A 19 channel digital EEG was performed using the international 10-20 placement system, including eye leads and EKG leads. Duration: 20 minutes Findings: The patient is drowsy throughout much of the study.  The background shows diffuse low to medium amplitude 5-7 Hz theta with an overlay of EMG artifact.  A clear posterior rhythm is not seen.  Stage II sleep is not clearly present.  No epileptiform activity or electrographic seizures are present.  Hyperventilation and photic stimulation are not performed.  No electrographic seizures are present. Video: Available Technical quality: Superior EKG: Regular, 80 bpm SUMMARY: Generalized slow Excess drowsiness No focal features or epileptiform activity are seen     Diffuse cerebral dysfunction of mild degree, nonspecific No evidence for epilepsy is seen This report is transcribed using the Dragon dictation system.      XR Abdomen KUB    Result Date: 2/6/2024  XR ABDOMEN KUB Date of Exam: 2/6/2024 2:56 PM EST Indication: ng tube placement Comparison: None available. Findings: Single portable supine view. There is an NG tube with its tip in the distal gastric antrum or pylorus. No bowel dilatation is seen.     Impression: NG tube tip in the distal stomach. Electronically Signed: Kerri Peters MD  2/6/2024 3:05 PM EST  Workstation ID: [id number]    CT Angiogram Head w AI Analysis of LVO    Result Date: 2/5/2024  CT ANGIOGRAM HEAD W AI ANALYSIS OF LVO, CT ANGIOGRAM NECK, CT CEREBRAL PERFUSION W WO CONTRAST Date of Exam: 2/5/2024 8:46 PM EST Indication: Neuro deficit, acute stroke suspected Neuro deficit, acute stroke suspected. Comparison: Concurrently performed noncontrast head CT. Technique: Axial CT images of the brain were obtained prior to and after  the administration of 150 mL Isovue-370. CT Perfusion protocol was utilized. Automated post processing was performed by RAPID software and submitted to PACS for interpretation.  CTA  of the head and neck were performed after the administration of iodinated contrast.  Reconstructed coronal and sagittal images were also obtained. A 3-D volume rendered image was created for interpretation. Automated exposure control and iterative reconstruction methods were used. Findings: CT Perfusion: No evidence of core infarct. Ischemic penumbra present within the right MCA territory, however unable to quantify given background of generalized hypoperfusion. Vascular findings: Visualized portions of the aortic arch are patent without evidence of significant ostial stenosis. Common carotid arteries are patent bilaterally. Mixed atheromatous disease of the bilateral carotid artery bifurcations without significant stenosis by NASCET criteria. The cervical portions of the internal carotid arteries are patent bilaterally. The intracranial portions of the bilateral internal carotid arteries are patent with atherosclerotic disease present. Abrupt vessel cut off of the proximal M2  segment of the right MCA. Left MCA is patent. 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 The vertebral arteries are patent bilaterally. The basilar artery is patent. The posterior cerebral arteries are patent bilaterally. No evidence of dissection or aneurysm. Nonvascular findings: Lung apices are grossly clear with mild paraseptal emphysematous changes present. 1.5 cm right thyroid nodule. Patient is edentulous. Aerodigestive structures appear within normal limits. No suspicious lymphadenopathy. Degenerative changes of the cervical spine with reversal of the normal cervical lordosis. No acute or suspicious osseous lesions.     Impression: Occlusion of the proximal M2 segment of the  right MCA. No evidence of core infarct with ischemic penumbra present within the right MCA territory, however unable to quantify given background of generalized hypoperfusion. Findings discussed with Gautam of the stroke team at 9:34 p.m. on 2/5/2024. Electronically Signed: Raymond Garcia MD  2/5/2024 9:56 PM EST  Workstation ID: [id number]    CT CEREBRAL PERFUSION WITH & WITHOUT CONTRAST    Result Date: 2/5/2024  CT ANGIOGRAM HEAD W AI ANALYSIS OF LVO, CT ANGIOGRAM NECK, CT CEREBRAL PERFUSION W WO CONTRAST Date of Exam: 2/5/2024 8:46 PM EST Indication: Neuro deficit, acute stroke suspected Neuro deficit, acute stroke suspected. Comparison: Concurrently performed noncontrast head CT. Technique: Axial CT images of the brain were obtained prior to and after the administration of 150 mL Isovue-370. CT Perfusion protocol was utilized. Automated post processing was performed by RAPID software and submitted to PACS for interpretation.  CTA  of the head and neck were performed after the administration of iodinated contrast.  Reconstructed coronal and sagittal images were also obtained. A 3-D volume rendered image was created for interpretation. Automated exposure control and iterative reconstruction methods were used. Findings: CT Perfusion: No evidence of core infarct. Ischemic penumbra present within the right MCA territory, however unable to quantify given background of generalized hypoperfusion. Vascular findings: Visualized portions of the aortic arch are patent without evidence of significant ostial stenosis. Common carotid arteries are patent bilaterally. Mixed atheromatous disease of the bilateral carotid artery bifurcations without significant stenosis by NASCET criteria. The cervical portions of the internal carotid arteries are patent bilaterally. The intracranial portions of the bilateral internal carotid arteries are patent with atherosclerotic disease present. Abrupt vessel cut off of the proximal M2  segment of  the right MCA. Left MCA is patent. 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 The vertebral arteries are patent bilaterally. The basilar artery is patent. The posterior cerebral arteries are patent bilaterally. No evidence of dissection or aneurysm. Nonvascular findings: Lung apices are grossly clear with mild paraseptal emphysematous changes present. 1.5 cm right thyroid nodule. Patient is edentulous. Aerodigestive structures appear within normal limits. No suspicious lymphadenopathy. Degenerative changes of the cervical spine with reversal of the normal cervical lordosis. No acute or suspicious osseous lesions.     Impression: Occlusion of the proximal M2 segment of the right MCA. No evidence of core infarct with ischemic penumbra present within the right MCA territory, however unable to quantify given background of generalized hypoperfusion. Findings discussed with Gautam of the stroke team at 9:34 p.m. on 2/5/2024. Electronically Signed: Raymond Garcia MD  2/5/2024 9:56 PM EST  Workstation ID: [id number]    CT Angiogram Neck    Result Date: 2/5/2024  CT ANGIOGRAM HEAD W AI ANALYSIS OF LVO, CT ANGIOGRAM NECK, CT CEREBRAL PERFUSION W WO CONTRAST Date of Exam: 2/5/2024 8:46 PM EST Indication: Neuro deficit, acute stroke suspected Neuro deficit, acute stroke suspected. Comparison: Concurrently performed noncontrast head CT. Technique: Axial CT images of the brain were obtained prior to and after the administration of 150 mL Isovue-370. CT Perfusion protocol was utilized. Automated post processing was performed by RAPID software and submitted to PACS for interpretation.  CTA  of the head and neck were performed after the administration of iodinated contrast.  Reconstructed coronal and sagittal images were also obtained. A 3-D volume rendered image was created for interpretation. Automated exposure control and iterative reconstruction methods  were used. Findings: CT Perfusion: No evidence of core infarct. Ischemic penumbra present within the right MCA territory, however unable to quantify given background of generalized hypoperfusion. Vascular findings: Visualized portions of the aortic arch are patent without evidence of significant ostial stenosis. Common carotid arteries are patent bilaterally. Mixed atheromatous disease of the bilateral carotid artery bifurcations without significant stenosis by NASCET criteria. The cervical portions of the internal carotid arteries are patent bilaterally. The intracranial portions of the bilateral internal carotid arteries are patent with atherosclerotic disease present. Abrupt vessel cut off of the proximal M2  segment of the right MCA. Left MCA is patent. 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 The vertebral arteries are patent bilaterally. The basilar artery is patent. The posterior cerebral arteries are patent bilaterally. No evidence of dissection or aneurysm. Nonvascular findings: Lung apices are grossly clear with mild paraseptal emphysematous changes present. 1.5 cm right thyroid nodule. Patient is edentulous. Aerodigestive structures appear within normal limits. No suspicious lymphadenopathy. Degenerative changes of the cervical spine with reversal of the normal cervical lordosis. No acute or suspicious osseous lesions.     Impression: Occlusion of the proximal M2 segment of the right MCA. No evidence of core infarct with ischemic penumbra present within the right MCA territory, however unable to quantify given background of generalized hypoperfusion. Findings discussed with Gautam of the stroke team at 9:34 p.m. on 2/5/2024. Electronically Signed: Raymond Garcia MD  2/5/2024 9:56 PM EST  Workstation ID: [id number]    XR Chest 1 View    Result Date: 2/5/2024  XR CHEST 1 VW Date of Exam: 2/5/2024 9:39 PM EST Indication: Acute Stroke  Protocol (onset < 12 hrs) Comparison: None available. Findings: No focal consolidation. No pneumothorax or pleural effusion. Cardiac size is normal. The visualized clavicles appear intact. No displaced rib fractures. The visualized upper abdomen is normal.     Impression: No acute cardiopulmonary disease. Electronically Signed: Souleymane Boone MD  2/5/2024 9:51 PM EST  Workstation ID: [id number]    CT Head Without Contrast Stroke Protocol    Result Date: 2/5/2024  CT HEAD WO CONTRAST STROKE PROTOCOL Date of Exam: 2/5/2024 8:42 PM EST Indication: Neuro deficit, acute, stroke suspected. Comparison: Brain MRI and head CT 2/4/2023. Technique: Axial CT images were obtained of the head without contrast administration.  Reconstructed coronal images were also obtained. Automated exposure control and iterative construction methods were used. Scan Time: 20:42 Results discussed with the clinical team via telephone by Raymond Garcia MD at 20:45. Findings: No evidence of acute intracranial hemorrhage or mass effect. No extra-axial collection. The gray white matter differentiation is preserved. Global parenchymal atrophy. Periventricular and subcortical white matter hypodensity, nonspecific, but likely sequela of chronic small vessel ischemic disease. Chronic infarcts in the left cerebellum and right basal ganglia. The mastoid air cells and paranasal sinuses are well aerated. Globes and extraocular muscles are unremarkable. No acute or suspicious osseous abnormality. Soft tissues within normal limits.     Impression: No evidence of acute intracranial abnormality. Similar chronic findings, including advanced white matter change that is likely sequela of chronic small vessel ischemic disease. Electronically Signed: Raymond Garcia MD  2/5/2024 8:52 PM EST  Workstation ID: [id number]       During this visit the following were done:  Labs Reviewed [x]    Labs Ordered []    Radiology Reports Reviewed [x]    Radiology Ordered []    EKG, echo,  and/or stress test reviewed []    EEG results reviewed  []    EEG reviewed and interpreted per myself   []    Discussed case with neurointerventionalist or neuroradiologist []    Referring Provider Records Reviewed []    ER Records Reviewed []    Hospital Records Reviewed []    History Obtained From Family []    Radiological images view and Interpreted per myself [x]    Case Discussed with referring provider []     Decision to obtain and request outside records  []        Assessment and Plan       Large acute right MCA stroke, s/p M2 MT, with reperfusion hemorrhage and edema, worsening midline shift, new right cerebellar stroke. ICH stable. EEG w/o epileptic changes. Likely cause PAF. Respiratory failure, aspiration PNA. Unfavorable prognosis for functional recovery. Discussed with patient's daughters and son on a conference call who wish to continue all measures.   .   - Wean sedation and vent as tolerated.   - BP<140/90, use Cardene prn.   - Stopped aspirin.   - Daily CT head.   - 3% saline, sodium goal 150-155.   - Palliative medicine consult.                     Electronically signed by Genaro Solomon MD on 2/8/2024 at 12:02 EST

## 2024-02-09 NOTE — PROGRESS NOTES
Multidisciplinary Rounds EN Review Note    Patient Name: Thelma Michael  Date of Encounter: 24 15:18 EST  MRN: [id number]  Admission date: 2024    Reason for visit: EN review . RD to continue to follow per protocol.     EMR reviewed   Medication reviewed- propofol @ 18.36 ml/hr, 3% NS @ 15 ml/hr  Labs reviewed- Na 152, -300, Creatinine 2.16, BUN 53    Estimated/Assessed Needs (24):      Energy: 1600 kcal  Protein: 72 g (1.1 g/kg with current renal fx)  Fluids: per clinical status    Additional Information Obtained:   Pt tolerating EN at goal. Sodium within goal, hypertonic infusion d/c this morning.     Current diet: NPO Diet NPO Type: Strict NPO    EN:  Vital 1.5  Goal Rate: 45 ml/hr    Water Flushes: 30 ml q 4 hr  Modular: None  Route: NG  Tube: Unknown    At goal over: 20Hrs/day     Rx will supply:   Goal Volume 900 mL/day       Flush Volume 150 mL/day       Energy 1350 Kcal/day *84 % Est Need   Protein 61 g/day 85 % Est Need   Fiber 5 g/day       Water in   mL       Total Water 834 mL       Meet DRI No         *+ ~485 kcal from propofol = 1834 (98% est. needs)  --------------------------------------------------------------------------  Product/Rate verified at bedside: Yes  Infusing Rate at time of visit: 45 ml/hr    Average Delivery from [place] Day: (while advancing)  Volume 800 mL/day 88  % Goal Vol.   Flush Volume 270 mL/day     Energy  Kcal/day  % Est Need   Protein  g/day  % Est Need   Fiber  g/day     Water in  EN  mL     Total Water  mL     Meet DRI No          Intervention:  Follow treatment plan  Care plan reviewed    Adjust EN to account for additional calories from propofol:  Decrease goal to 35 ml/hr, continue water 30 ml q 4.  Holding off on prosource given renal fx at this time.     Rx will supply:   Goal Volume 700 mL/day       Flush Volume 150 mL/day       Energy 1050 Kcal/day **66 % Est Need   Protein 48 g/day 85 % Est Need   Fiber 4 g/day    (soluble)    Water in   mL       Total Water 689 mL       Meet DRI No         *+ ~485 kcal from propofol = 1535 (96% est. needs)    Follow up:   Per protocol      Kylah Gonzalez RD, [facility]  15:18 EST  Time: 25min

## 2024-02-09 NOTE — PLAN OF CARE
Problem: Adult Inpatient Plan of Care  Goal: Plan of Care Review  Outcome: Ongoing, Progressing     Problem: Diabetes Comorbidity  Goal: Blood Glucose Level Within Targeted Range  Outcome: Ongoing, Progressing  Intervention: Monitor and Manage Glycemia  Recent Flowsheet Documentation  Taken 2/9/2024 0000 by Leighann Roberts RN  Glycemic Management: blood glucose monitored  Taken 2/8/2024 2200 by Leighann Roberts RN  Glycemic Management: blood glucose monitored  Taken 2/8/2024 2000 by Leighann Roberts RN  Glycemic Management: blood glucose monitored     Problem: Hypertension Comorbidity  Goal: Blood Pressure in Desired Range  Outcome: Ongoing, Progressing  Intervention: Maintain Blood Pressure Management  Recent Flowsheet Documentation  Taken 2/9/2024 0000 by Leighann Roberts RN  Medication Review/Management: medications reviewed  Taken 2/8/2024 2200 by Leighann Roberts RN  Medication Review/Management: medications reviewed  Taken 2/8/2024 2000 by Leighann Roberts RN  Medication Review/Management: medications reviewed     Problem: Communication Impairment (Mechanical Ventilation, Invasive)  Goal: Effective Communication  Outcome: Ongoing, Progressing  Intervention: Ensure Effective Communication  Recent Flowsheet Documentation  Taken 2/8/2024 2200 by Leighann Roberts RN  Communication Enhancement Strategies: family involved in communication plan  Taken 2/8/2024 2000 by Leighann Roberts RN  Communication Enhancement Strategies:   family involved in communication plan   repetition utilized     Problem: Device-Related Complication Risk (Mechanical Ventilation, Invasive)  Goal: Optimal Device Function  Outcome: Ongoing, Progressing  Intervention: Optimize Device Care and Function  Recent Flowsheet Documentation  Taken 2/9/2024 0000 by Leighann Roberts RN  Airway Safety Measures:   suction at bedside   oxygen flowmeter at bedside  Taken 2/8/2024 2200 by Leighann Roberts RN  Airway Safety Measures: suction at bedside  Taken  2/8/2024 2000 by Leighann Roberts RN  Airway Safety Measures:   suction at bedside   oxygen flowmeter at bedside     Problem: Inability to Wean (Mechanical Ventilation, Invasive)  Goal: Mechanical Ventilation Liberation  Outcome: Ongoing, Progressing  Intervention: Promote Extubation and Mechanical Ventilation Liberation  Recent Flowsheet Documentation  Taken 2/9/2024 0000 by Leighann Roberts RN  Environmental Support: calm environment promoted  Medication Review/Management: medications reviewed  Taken 2/8/2024 2200 by Leighann Roberts RN  Environmental Support: calm environment promoted  Medication Review/Management: medications reviewed  Taken 2/8/2024 2000 by Leighann Roberts RN  Environmental Support: calm environment promoted  Medication Review/Management: medications reviewed     Problem: Skin Injury Risk Increased  Goal: Skin Health and Integrity  Outcome: Ongoing, Progressing  Intervention: Optimize Skin Protection  Recent Flowsheet Documentation  Taken 2/9/2024 0000 by Leighann Roberts RN  Pressure Reduction Techniques:   frequent weight shift encouraged   weight shift assistance provided  Head of Bed (HOB) Positioning: HOB at 30-45 degrees  Pressure Reduction Devices:   specialty bed utilized   pressure-redistributing mattress utilized   positioning supports utilized  Skin Protection:   tubing/devices free from skin contact   transparent dressing maintained   skin-to-skin areas padded   adhesive use limited  Taken 2/8/2024 2200 by Leighann Roberts RN  Pressure Reduction Techniques:   weight shift assistance provided   frequent weight shift encouraged  Head of Bed (HOB) Positioning: HOB at 30-45 degrees  Pressure Reduction Devices:   specialty bed utilized   pressure-redistributing mattress utilized   positioning supports utilized  Skin Protection:   tubing/devices free from skin contact   transparent dressing maintained   adhesive use limited  Taken 2/8/2024 2000 by Leighann Roberts RN  Pressure Reduction  Techniques:   frequent weight shift encouraged   weight shift assistance provided  Head of Bed (HOB) Positioning: HOB at 30-45 degrees  Pressure Reduction Devices:   specialty bed utilized   pressure-redistributing mattress utilized   positioning supports utilized  Skin Protection:   tubing/devices free from skin contact   transparent dressing maintained   adhesive use limited   Goal Outcome Evaluation:

## 2024-02-09 NOTE — PLAN OF CARE
Problem: Adult Inpatient Plan of Care  Goal: Plan of Care Review  Flowsheets (Taken 2/9/2024 1015)  Plan of Care Reviewed With: son  Outcome Evaluation: New palliative care consult from TRISTIN Solomon MD for assist with goals of care and support to family.  Chart reviewed, Living will in medical record reviewed - patient's son Keshav Soto.  Palliative NP LIONEL DENISE and Palliative SW LIONEL Pablo LCSW, discussed patient status and prognosis via phone.  Keshav states that he will respect his mother's wishes and advocate for her care - he and his sisters are at different places of acceptance with regard to patient status/prognosis.  He agrees to No CPR at this time, patient is already extubated, he would like to give a 3 day window before making further decisions but does state his mother would not want long term intubation or trach, no PEG/artificial feeding - she would not want to be prolonged but would want comfort measures.  These limits and boundaries are consistent with patient's living will and prior stated wishes.  Palliative Care continues to follow for support and assist with plan of care. Palliative Care contact information, blanket, and meal discount card left at bedside     Problem: Palliative Care  Goal: Enhanced Quality of Life  Intervention: Promote Advance Care Planning  Flowsheets (Taken 2/9/2024 0955)  Life Transition/Adjustment:   decision-making facilitated   palliative care discussed   palliative care initiated  Intervention: Optimize Psychosocial Wellbeing  Flowsheets  Taken 2/9/2024 1300  Supportive Measures: Palliative IDT: RUY Bridges MD, GILBERT DENISE, LIONEL DENISE, LIONEL Pablo LCSW, TRISTIN Thakur RN, VIRAJ Sun RN, OFELIA Melendez MDiv, KEL Salinas MDiv  Taken 2/9/2024 1015  Supportive Measures: (symptom assessment) other (see comments)  Taken 2/9/2024 0955  Family/Support System Care:   support provided   caregiver stress acknowledged

## 2024-02-09 NOTE — CONSULTS
Palliative Care Initial Consult   Attending Physician: Stephan Norton*  Referring Provider: Dr. Solomon    Reason for Referral:  assistance with advance directives and assistance with clarification of goals of care    Code Status:   Code Status and Medical Interventions:   Ordered at: 02/09/24 0955     Level Of Support Discussed With:    Health Care Surrogate     Code Status (Patient has no pulse and is not breathing):    No CPR (Do Not Attempt to Resuscitate)     Medical Interventions (Patient has pulse or is breathing):    Full Support      Advanced Directives: Living will on chart  Family/Support:  2 daughters/son  Goals of Care: TBD.    HPI: Thelma Michael is a 64yo female with history of T1DM, GERD, TIA, HLD, HTN and Afib who presented to [facility] ED via EMS on 2/5/24 with CVA-worsening mental status and aspiration led to intubation and mechanical ventilation. Palliative care consulted for clarification of goals of care and support with medical decision making.       ROS: unable to perform ROS: intubated      Past Medical History:   Diagnosis Date    Acid reflux     Acute bronchitis     Cardiac murmur     Depression with anxiety 10/05/2020    Diabetes mellitus     Diabetes mellitus type I     Gastroesophageal reflux disease 05/13/2016    H/O echocardiogram 08/07/2012    i. LVEF 65%.ii. Mild LVH.iii. Borderline evidence of atrial septal aneurysm.  No PFO.     History of nuclear stress test 08/22/2014    Negative for ischemia and scars; LVEF 77%.      History of TIAs 07/15/2021    Hyperlipidemia     Hypertension     Impacted cerumen of both ears     Migraine     Migraines 10/31/2019    Self-catheterizes urinary bladder     Sinusitis     Stroke     Tobacco abuse     quit 4 days ago.      Urticaria     Vitamin D deficiency 05/13/2016     Past Surgical History:   Procedure Laterality Date    CAPSULE ENDOSCOPY  07/27/2021    Procedure: PILLCAM DEPLOYMENT;  Surgeon: Mikael Worthy MD;  Location: [facility]  ENDOSCOPY;  Service: Gastroenterology;;    COLONOSCOPY      COLONOSCOPY N/A 2021    Procedure: COLONOSCOPY;  Surgeon: Mikael Worthy MD;  Location:  [facility];  Service: Gastroenterology;  Laterality: N/A;    DENTAL PROCEDURE      ENDOSCOPY N/A 2021    Procedure: ESOPHAGOGASTRODUODENOSCOPY;  Surgeon: Brunner, Mark I, MD;  Location:  [facility];  Service: Gastroenterology;  Laterality: N/A;    ENDOSCOPY N/A 2021    Procedure: ESOPHAGOGASTRODUODENOSCOPY;  Surgeon: Mikael Worthy MD;  Location:  [facility];  Service: Gastroenterology;  Laterality: N/A;    ENDOSCOPY WITH JTUBE N/A 2022    Procedure: ESOPHAGOGASTRODUODENOSCOPY WITH JEJUNAL TUBE INSERTION;  Surgeon: Thom Glover MD;  Location:  [facility];  Service: Gastroenterology;  Laterality: N/A;    GASTRIC STIMULATOR IMPLANT SURGERY N/A     For gastropareisis    INTERVENTIONAL RADIOLOGY PROCEDURE N/A 2024    Procedure: IR mechanical thrombectomy;  Surgeon: Rambo Coronado MD;  Location:  [facility];  Service: Interventional Radiology;  Laterality: N/A;    UPPER GASTROINTESTINAL ENDOSCOPY       Social History     Socioeconomic History    Marital status:    Tobacco Use    Smoking status: Former     Packs/day: 0.25     Years: 30.00     Additional pack years: 0.00     Total pack years: 7.50     Types: Cigarettes     Quit date: 2019     Years since quittin.7    Smokeless tobacco: Never    Tobacco comments:     BC PL never smoker    Vaping Use    Vaping Use: Never used   Substance and Sexual Activity    Alcohol use: Yes     Alcohol/week: 1.0 standard drink of alcohol     Types: 1 Glasses of wine per week     Comment: social    Drug use: Not Currently     Frequency: 2.0 times per week     Types: Marijuana    Sexual activity: Not Currently     Comment: Single      Family History   Problem Relation Age of Onset    Anxiety disorder Other     Arthritis Other     ADD / ADHD  "Other     Heart disease Other         cardiac disorder    Depression Other     Diabetes Other     Hyperlipidemia Other     Hypertension Other     Lung cancer Other     Osteoporosis Other     Hypertension Brother     Diabetes Brother     Hyperlipidemia Mother     Hypertension Mother     Colon cancer Neg Hx        No Known Allergies    Current medication reviewed for route, type, dose and frequency and are current per MAR at time of dictation.    Palliative Performance Scale Score:      /61   Pulse 82   Temp 98.5 °F (36.9 °C) (Axillary)   Resp (!) 29   Ht 172 cm (67.72\")   Wt 70.4 kg (155 lb 3.3 oz)   SpO2 94%   BMI 23.80 kg/m²     Intake/Output Summary (Last 24 hours) at 2/9/2024 0958  Last data filed at 2/9/2024 0800  Gross per 24 hour   Intake 3955.65 ml   Output 820 ml   Net 3135.65 ml       Physical Exam:    General Appearance:    intubated   HEENT:    Eyes closed, no response to voice, touch   Neck:   supple, trachea midline, no JVD   Lungs:     ET tube in place, coarse breath sounds bilaterally    Heart:    Normal rate and rhythm   Abdomen:     Normal bowel sounds, soft, nontender, nondistended   G/U:   Deferred   MSK/Extremities:   +1 LE edema   Pulses:   Pulses palpable and equal bilaterally   Skin:   Warm, dry   Neurologic:   Unresponsive to voice, touch   Psych:   sedated         Labs:   Results from last 7 days   Lab Units 02/08/24  0257   WBC 10*3/mm3 8.90   HEMOGLOBIN g/dL 10.0*   HEMATOCRIT % 29.5*   PLATELETS 10*3/mm3 192     Results from last 7 days   Lab Units 02/09/24  0636   SODIUM mmol/L 151*   POTASSIUM mmol/L 4.1   CHLORIDE mmol/L 123*   CO2 mmol/L 15.0*   BUN mg/dL 53*   CREATININE mg/dL 2.16*   GLUCOSE mg/dL 248*   CALCIUM mg/dL 7.6*     Results from last 7 days   Lab Units 02/09/24  0636 02/06/24  0331 02/05/24  2124   SODIUM mmol/L 151*   < > 138   POTASSIUM mmol/L 4.1   < > 3.7   CHLORIDE mmol/L 123*   < > 97*   CO2 mmol/L 15.0*   < > 26.0   BUN mg/dL 53*   < > 35*   CREATININE " mg/dL 2.16*   < > 2.06*   CALCIUM mg/dL 7.6*   < > 8.8   BILIRUBIN mg/dL  --   --  0.3   ALK PHOS U/L  --   --  119*   ALT (SGPT) U/L  --   --  13   AST (SGOT) U/L  --   --  13   GLUCOSE mg/dL 248*   < > 350*    < > = values in this interval not displayed.     Imaging Results (Last 72 Hours)       Procedure Component Value Units Date/Time    XR Chest 1 View [[id number]] Collected: 02/09/24 0755     Updated: 02/09/24 0759    Narrative:      XR CHEST 1 VW    Date of Exam: 2/9/2024 1:31 AM EST    Indication: Intubated Patient    Comparison: Chest x-ray 2/8/2024    Findings:  Stable medical support devices. Normal cardiomediastinal silhouette. Unchanged patchy parenchymal opacities throughout the mid and lower right lung compatible with pneumonia. No definite new or worsening airspace disease. No pleural effusion. No   pneumothorax.      Impression:      Impression:  Unchanged right lung pneumonia.      Electronically Signed: Gautam Minor MD    2/9/2024 7:56 AM EST    Workstation ID: [id number]    CT Head Without Contrast [[id number]] Collected: 02/09/24 0721     Updated: 02/09/24 0735    Narrative:      CT HEAD WO CONTRAST    Date of Exam: 2/9/2024 4:58 AM EST    Indication: Stroke, follow up.    Comparison: Head CT 2/8/2024, 2/6/2024    Technique: Axial CT images were obtained of the head without contrast administration.  Automated exposure control and iterative construction methods were used.      Findings:  Redemonstration of acute intraparenchymal hemorrhage in the right temporal lobe which appears unchanged compared to yesterday's head CT. No new or worsening intracranial hemorrhage. There is continued evolution of a large right MCA territory infarct with   confluent hypodensity throughout the right frontal and right parietal lobes. Continued evolution of an acute infarct in the right cerebellar hemisphere. Redemonstration of local and global mass effect including effacement of the right frontal and right    temporal sulci; there is increased leftward midline shift now measuring approximately 8 mm, measuring approximately 4 mm on yesterday's head CT based on same day measurements made in similar fashion. No definite uncal herniation at this time. No   tonsillar herniation. Similar effacement of the occipital horn of the right lateral ventricle and partial effacement of the right temporal horn. No evidence of intraventricular blood products or hydrocephalus. Unremarkable appearance of the orbits. The   paranasal sinuses demonstrate scattered mucosal thickening of the ethmoid air cells. The mastoid air cells are clear. Partial visualization of NG/OG tube.      Impression:      Impression:  Continued evolution of large right MCA territory infarct with hemorrhagic conversion in the right temporal lobe. No new or worsening intracranial hemorrhage. There is increased global mass effect with worsening of leftward midline shift. No definite   uncal herniation at this time.    Evolving right cerebellar infarct.        Electronically Signed: Gautam Minor MD    2/9/2024 7:32 AM EST    Workstation ID: [id number]    XR Chest 1 View [[id number]] Collected: 02/08/24 0812     Updated: 02/08/24 0828    Narrative:      XR CHEST 1 VW    Date of Exam: 2/8/2024 1:05 AM EST    Indication: Intubated Patient    Comparison: 1 day prior.    Findings:  Support hardware projects unchanged. There is persistent, unchanged dense airspace disease on the right concerning for pneumonia. No new focal airspace disease is present elsewhere. There is no effusion or pneumothorax. Unchanged heart and mediastinal   contours.      Impression:      Impression:  Support hardware projects unchanged. There is persistent, unchanged dense airspace disease on the right concerning for pneumonia. No new focal airspace disease is present elsewhere. There is no effusion or pneumothorax. Unchanged heart and mediastinal   contours.      Electronically Signed: Arya  MD Toni    2/8/2024 8:25 AM EST    Workstation ID: [id number]    CT Head Without Contrast [[id number]] Collected: 02/08/24 0358     Updated: 02/08/24 0415    Narrative:      CT HEAD WO CONTRAST    Date of Exam: 2/8/2024 3:37 AM EST    Indication: Stroke, follow up.    Comparison: CT the head dated February 7, 2024    Technique: Axial CT images were obtained of the head without contrast administration.  Automated exposure control and iterative construction methods were used.    Findings:  There is an evolving right middle cerebral artery infarct with diffuse low-attenuation throughout the distribution of the MCA. Hemorrhagic conversion is noted within the anterior aspect of the right temporal lobe. The degree of surrounding vasogenic   edema has increased. Mass effect on the anterior aspect of the anterior horn of the right lateral ventricle. Similarly, there is effacement of the posterior horn the right lateral ventricle. Left to right midline shift has increased from about 4 mm to 7   mm. The low-density area of the right cerebellum is more pronounced consistent with evolving right cerebellar infarct. Diffuse ethmoid sinus disease is stable.      Impression:      Impression:  1.Evolving right middle cerebral artery infarct with hemorrhagic conversion of the anterior aspect of the right temporal lobe. There is increasing vasogenic edema and mass effect with increasing left to right midline shift from 4 mm to 7 mm.  2.Evolving right cerebellar infarct.        Electronically Signed: Berhane Cano MD    2/8/2024 4:11 AM EST    Workstation ID: [id number]    XR Chest 1 View [[id number]] Collected: 02/07/24 2115     Updated: 02/07/24 2124    Narrative:      XR CHEST 1 VW    Date of Exam: 2/7/2024 7:47 PM CST    Indication: worsening hypoxia    Comparison: 2/7/2024 at 5:22 a.m.    Findings:  Cardiomediastinal silhouette and support lines and tubes are unchanged. Improving aeration of the right lung with persistent right mid  to lower lung airspace disease. No superimposed airspace disease no pneumothorax. There is a small right pleural   effusion. No acute osseous abnormality identified.      Impression:      Impression:  Improving aeration of the right lung.      Electronically Signed: Ramón Archibald MD    2/7/2024 8:21 PM CST    Workstation ID: [id number]    XR Abdomen KUB [[id number]] Collected: 02/07/24 0821     Updated: 02/07/24 0826    Narrative:      XR ABDOMEN KUB    Date of Exam: 2/7/2024 4:53 AM EST    Indication: sbft post-pyloric goal    Comparison: 2/6/2024.    Findings:  Enteric tube tip is in the region of the pylorus or duodenal bulb. No bowel dilatation is present. Gastric stimulator device is again noted.      Impression:      Impression:  Feeding tube tip in the pylorus or duodenal bulb.      Electronically Signed: Kerri Peters MD    2/7/2024 8:23 AM EST    Workstation ID: [id number]    XR Chest 1 View [[id number]] Collected: 02/07/24 0758     Updated: 02/07/24 0817    Narrative:      XR CHEST 1 VW    Date of Exam: 2/7/2024 4:52 AM EST    Indication: Intubated Patient    Comparison: 2/6/2024    Findings:  ET tube in satisfactory position. NG tube courses through the thorax. There are increasing multifocal airspace opacities, right greater than left with an increasing right effusion. Right upper extremity PICC line in satisfactory position.      Impression:      Impression:    1. Increasing multifocal, right greater than left opacities and increasing right effusion. Favor multifocal pneumonia.  2. Support apparatus in satisfactory position.      Electronically Signed: Skip Valente MD    2/7/2024 8:13 AM EST    Workstation ID: [id number]    CT Head Without Contrast [[id number]] Collected: 02/07/24 0205     Updated: 02/07/24 0213    Narrative:      CT HEAD WO CONTRAST    Date of Exam: 2/7/2024 12:56 AM EST    Indication: Stroke, follow up.    Comparison: CT scan of the head from February 6, 2024 at 1655 hours    Technique:  Axial CT images were obtained of the head without contrast administration.  Automated exposure control and iterative construction methods were used.      Findings:  There is an evolving right middle cerebral artery infarct with diffuse low-attenuation throughout the MCA distribution. Hemorrhagic conversion is noted within the anterior aspect of the right temporal lobe. There is surrounding diffuse cerebral edema.   There is no significant midline shift. The ventricles are not significantly effaced. A low-attenuation area in the right cerebellum has developed in the interval. A new focus of infarct is not excluded. Chronic microvascular ischemic changes stable.      Impression:      Impression:  Evolving right middle cerebral artery distribution infarct with hemorrhagic conversion in the anterior aspect of the right temporal lobe. The overall appearance of the hemorrhagic component is unchanged and there is no evidence of new edema or mass   effect. There may be a new developing right cerebellar subacute infarct.        Electronically Signed: Berhane Cano MD    2/7/2024 2:09 AM EST    Workstation ID: [id number]    XR Abdomen KUB [[id number]] Collected: 02/06/24 1902     Updated: 02/06/24 1906    Narrative:      XR ABDOMEN KUB    Date of Exam: 2/6/2024 5:38 PM CST    Indication: kiofeed placement    Comparison: None available.    Findings:  There is a gastric tube with tip in the peripyloric region. No evidence of intestinal obstruction. No acute osseous process identified. No abnormal calcifications are identified.  Moderate fecal load.      Impression:      Impression:  Enteric tube tip in the peripyloric region.      Electronically Signed: Ramón Archibald MD    2/6/2024 6:02 PM CST    Workstation ID: [id number]    XR Chest 1 View [[id number]] Collected: 02/06/24 1856     Updated: 02/06/24 1902    Narrative:      XR CHEST 1 VW    Date of Exam: 2/6/2024 5:38 PM CST    Indication: intubation    Comparison:  2/5/2024    Findings:  Cardiomediastinal silhouette is unremarkable. There is an endotracheal tube with tip approximately 2 cm from the jamel. There is an enteric tube with the most distal visualized portion overlying the proximal stomach. There is a right-sided PICC with tip   at the cavoatrial junction there is right lower lung airspace disease. No pneumothorax nor significant effusion. No acute osseous abnormality identified.      Impression:      Impression:  1.Support lines and tubes as detailed above.  2.Right lower lobe airspace disease.      Electronically Signed: Ramón Archibald MD    2/6/2024 5:59 PM [facility]    Workstation ID: [id number]    CT Head Without Contrast [[id number]] Collected: 02/06/24 1658     Updated: 02/06/24 1707    Narrative:      CT HEAD WO CONTRAST    Date of Exam: 2/6/2024 4:50 PM EST    Indication: s/p thrombectomy neuro decline  Worsening stroke symptoms s/p thrombectomy.    Comparison: 2/5/2024    Technique: Axial CT images were obtained of the head without contrast administration.  Automated exposure control and iterative construction methods were used.    Findings: Hemorrhagic conversion right sided infarct with intraparenchymal hemorrhage within the right temporal lobe and large infarct of the right posterior frontal and right parietal lobes. Surrounding localized mass effect. No intraventricular   extension. Extensive chronic small vessel/microangiopathic ischemic changes. The posterior fossa appears normal. Sellar and suprasellar structures are normal.    Orbital and periorbital soft tissues are normal. The paranasal sinuses, ethmoid air cells, and mastoid air cells are aerated. The bony calvarium appears intact. No acute fractures. No lytic or blastic bony diseases.      Impression:      Impression: Right MCA territory infarct with hemorrhagic conversion into the right temporal lobe. Localized surrounding mass effect..    As of 5:01 p.m. on 2/6/2024 the care team is  aware.        Electronically Signed: Souleymane Boone MD    2/6/2024 5:04 PM EST    Workstation ID: [id number]    XR Abdomen KUB [[id number]] Collected: 02/06/24 1504     Updated: 02/06/24 1508    Narrative:      XR ABDOMEN KUB    Date of Exam: 2/6/2024 2:56 PM EST    Indication: ng tube placement    Comparison: None available.    Findings:  Single portable supine view. There is an NG tube with its tip in the distal gastric antrum or pylorus. No bowel dilatation is seen.      Impression:      Impression:  NG tube tip in the distal stomach.      Electronically Signed: Kerri Peters MD    2/6/2024 3:05 PM EST    Workstation ID: [id number]              Lab 02/05/24 2124   HEMOGLOBIN A1C 9.00*         Diagnostics: Reviewed    A:   Right MCA CVA    Diabetic peripheral neuropathy    Hyperlipidemia    Hypertension    Heart valve disease    Uncontrolled type 1 diabetes mellitus with hyperglycemia    Gastroparesis    PFO (patent foramen ovale)    Atrial fibrillation and flutter    Acute-on-CKD (chronic kidney disease) stage 3, GFR 30-59 ml/min    Nontraumatic hemorrhage of right cerebral hemisphere    Acute respiratory failure with hypoxia    Aspiration pneumonia of right lower lobe due to vomit       S/S:   Dyspnea           P:  GOC/ACP  Called Keshav Purcell, patient's son and POA. He states that he understands the grave prognosis and expected poor outcome but wants to give his mom 3 days and then will evaluate because he feels that if the pneumonia improves, the patient may improve. He states that his mom had previously made her wishes clear and would not want to be on a ventilator, would not want a feeding tube and that if there is no improvement he would like to focus on her comfort and allow her to die naturally. He said that his sisters are not in agreement and will push for all aggressive measures as long as they are told that there is even the slightest possibility for improvement but that they do not have realistic  expectations or goals for their mom. He states that they need support and would benefit from conversations with the palliative care team. Left a message with the nurse to call palliative care when family arrive.   Code status changed to reflect patient and son's desire for no CPR.   Thank you for this consult and allowing us to participate in patient's plan of care. Palliative Care Team will continue to follow patient. Please do not hesitate to contact us regarding further symptom management or goals of care needs.  Time: 35  minutes spent reviewing medical and medication records, assessing and examining patient, discussing with family, answering questions, providing some guidance about a plan and documentation of care, and coordinating care with other healthcare members, with > 50% time spent face to face.         Haydee Hodgson, APRN  2/9/2024

## 2024-02-09 NOTE — PROGRESS NOTES
Neurology Note    Patient:  Thelma Michael    YOB: 1960    REFERRING PHYSICIAN:  Dr. Norton    CHIEF COMPLAINT:    Stroke    HISTORY OF PRESENT ILLNESS:   The patient remains intubated, on propofol, flutters eyes but does not respond when sedation is stopped, withdraws right side, no seizures reported.    Past Medical History:  Past Medical History:   Diagnosis Date    Acid reflux     Acute bronchitis     Cardiac murmur     Depression with anxiety 10/05/2020    Diabetes mellitus     Diabetes mellitus type I     Gastroesophageal reflux disease 05/13/2016    H/O echocardiogram 08/07/2012    i. LVEF 65%.ii. Mild LVH.iii. Borderline evidence of atrial septal aneurysm.  No PFO.     History of nuclear stress test 08/22/2014    Negative for ischemia and scars; LVEF 77%.      History of TIAs 07/15/2021    Hyperlipidemia     Hypertension     Impacted cerumen of both ears     Migraine     Migraines 10/31/2019    Self-catheterizes urinary bladder     Sinusitis     Stroke     Tobacco abuse     quit 4 days ago.      Urticaria     Vitamin D deficiency 05/13/2016       Past Surgical History:  Past Surgical History:   Procedure Laterality Date    CAPSULE ENDOSCOPY  07/27/2021    Procedure: PILLCAM DEPLOYMENT;  Surgeon: Mikael Worthy MD;  Location:  [facility];  Service: Gastroenterology;;    COLONOSCOPY      COLONOSCOPY N/A 07/27/2021    Procedure: COLONOSCOPY;  Surgeon: Mikael Worthy MD;  Location:  [facility];  Service: Gastroenterology;  Laterality: N/A;    DENTAL PROCEDURE      ENDOSCOPY N/A 06/30/2021    Procedure: ESOPHAGOGASTRODUODENOSCOPY;  Surgeon: Brunner, Mark I, MD;  Location:  [facility];  Service: Gastroenterology;  Laterality: N/A;    ENDOSCOPY N/A 07/27/2021    Procedure: ESOPHAGOGASTRODUODENOSCOPY;  Surgeon: Mikael Worthy MD;  Location:  [facility];  Service: Gastroenterology;  Laterality: N/A;    ENDOSCOPY WITH JTUBE N/A 03/16/2022    Procedure:  ESOPHAGOGASTRODUODENOSCOPY WITH JEJUNAL TUBE INSERTION;  Surgeon: Thom Glover MD;  Location:  [facility];  Service: Gastroenterology;  Laterality: N/A;    GASTRIC STIMULATOR IMPLANT SURGERY N/A     For gastropareisis    INTERVENTIONAL RADIOLOGY PROCEDURE N/A 2024    Procedure: IR mechanical thrombectomy;  Surgeon: Rambo Coronado MD;  Location:  [facility];  Service: Interventional Radiology;  Laterality: N/A;    UPPER GASTROINTESTINAL ENDOSCOPY         Social History:   Social History     Socioeconomic History    Marital status:    Tobacco Use    Smoking status: Former     Packs/day: 0.25     Years: 30.00     Additional pack years: 0.00     Total pack years: 7.50     Types: Cigarettes     Quit date: 2019     Years since quittin.7    Smokeless tobacco: Never    Tobacco comments:     BC PL never smoker    Vaping Use    Vaping Use: Never used   Substance and Sexual Activity    Alcohol use: Yes     Alcohol/week: 1.0 standard drink of alcohol     Types: 1 Glasses of wine per week     Comment: social    Drug use: Not Currently     Frequency: 2.0 times per week     Types: Marijuana    Sexual activity: Not Currently     Comment: Single         Family History:   Family History   Problem Relation Age of Onset    Anxiety disorder Other     Arthritis Other     ADD / ADHD Other     Heart disease Other         cardiac disorder    Depression Other     Diabetes Other     Hyperlipidemia Other     Hypertension Other     Lung cancer Other     Osteoporosis Other     Hypertension Brother     Diabetes Brother     Hyperlipidemia Mother     Hypertension Mother     Colon cancer Neg Hx        Medications Prior to Admission:    Prior to Admission medications    Medication Sig Start Date End Date Taking? Authorizing Provider   acetaminophen (TYLENOL) 325 MG tablet Take 2 tablets by mouth Every 4 (Four) Hours As Needed for Mild Pain . 21   Marta Rodriguez PA-C   albuterol sulfate HFA  108 (90 Base) MCG/ACT inhaler Inhale 2 puffs Every 4 (Four) Hours As Needed for Wheezing. 1/5/23   Jordan Millan APRN   amLODIPine (NORVASC) 10 MG tablet Take 1 tablet by mouth Daily. 5/5/23   Rajat Akers APRN   atorvastatin (Lipitor) 80 MG tablet Take 1 tablet by mouth Daily. 10/19/23   Monet Howe APRN   Blood Glucose Monitoring Suppl (FreeStyle Lite) w/Device kit 1 Device 3 (Three) Times a Day. 8/4/23   Monet Howe APRN   calcium carbonate (TUMS) 500 MG chewable tablet Chew 1,000 mg 3 (Three) Times a Day As Needed for Indigestion or Heartburn. 5/6/21   Marta Rodriguez PA-C   carvedilol (COREG) 12.5 MG tablet Take 1 tablet by mouth 2 (Two) Times a Day With Meals. 9/18/23   Delia Triplett DO   Continuous Blood Gluc Transmit (Dexcom G6 Transmitter) misc 1 each by Other route Every 3 (Three) Months. 1/9/24   Gerson Ochoa MD   famotidine (PEPCID) 20 MG tablet Take 1 tablet by mouth Every Morning. 9/19/23   Delia Triplett DO   ferrous sulfate 325 (65 Fe) MG tablet Take with Vitamin C or Juice  Patient taking differently: Daily With Breakfast. Take with Vitamin C or Juice 7/11/23   Monet Howe APRN   gabapentin (NEURONTIN) 100 MG capsule Take 1 capsule by mouth 3 (Three) Times a Day As Needed (back pain and neuropathy). 10/19/23   Monet Howe APRN   glucose blood test strip Check blood sugar 5 times daily as needed. 6/26/23   Monet Howe APRN   Insulin Infusion Pump (T:slim X2 Insulin Pump) device Use.    Provider, MD Reddy   Insulin Lispro (humaLOG) 100 UNIT/ML injection Inject 2-7 Units under the skin into the appropriate area as directed 4 (Four) Times a Day Before Meals & at Bedtime. 1/9/24   Gerson Ochoa MD   lactobacillus acidophilus (RISAQUAD) capsule capsule Take 1 capsule by mouth Daily. 5/5/23   Rajat Akers APRN   lidocaine (LIDODERM) 5 % Place 1 patch on the skin as directed by  provider Daily. No more than 1 patch per 24 hours. 1/25/24   Monet Howe APRN   losartan (COZAAR) 100 MG tablet Take 1 tablet by mouth Daily. 1/19/24   Monet Howe APRN   melatonin 5 MG tablet tablet Take 1 tablet by mouth At Night As Needed (sleep). 5/6/21   Marta Rodriguez PA-C   mirtazapine (REMERON) 7.5 MG tablet Take 1 tablet by mouth Every Night. 6/26/23   Monet Howe APRN   Multivitamin tablet tablet Take 1 tablet by mouth Daily. 8/16/23   Monet Howe APRN   prochlorperazine (COMPAZINE) 5 MG tablet Take 1 tablet by mouth Every 6 (Six) Hours As Needed for Nausea or Vomiting. 9/18/23   Delia Triplett DO   rivaroxaban (XARELTO) 15 MG tablet Take 1 tablet by mouth Daily With Dinner 6/13/23   Krishna Jean MD   terazosin (HYTRIN) 1 MG capsule Take 1 capsule by mouth Every Night. 8/16/23   Monet Howe APRN   traMADol (ULTRAM) 50 MG tablet Take 1 tablet by mouth 2 (Two) Times a Day As Needed for Moderate Pain. 11/9/23   Monet Howe APRN   vitamin D (ERGOCALCIFEROL) 1.25 MG (87862 UT) capsule capsule Take 1 capsule by mouth 1 (One) Time Per Week. 7/11/23   Monet Howe APRN   Insulin Glargine (BASAGLAR KWIKPEN) 100 UNIT/ML injection pen Inject 10 Units under the skin into the appropriate area as directed Every Night. 3/19/22 3/19/22  Kath Rosado MD       Allergies:  Patient has no known allergies.      Review of system  Review of Systems   Unable to perform ROS: Intubated       Vitals:    02/09/24 1125   BP: 144/66   Pulse: 96   Resp:    Temp:    SpO2:        Physical exam  Physical Exam  Constitutional:       Interventions: She is intubated.   Cardiovascular:      Rate and Rhythm: Normal rate and regular rhythm.   Pulmonary:      Effort: She is intubated.   Neurological:      Comments: Eyes closed, no response to voice or touch, eyes midline, pupils 2-3 mm, positive right corneal response, absent on  the left. Left facial droop, weak withdrawal and decerebrate rotation of right arm to nail bed pressure, no response on the left. Plantar responses neutral.           Lab Results   Component Value Date    WBC 8.90 02/08/2024    HGB 10.0 (L) 02/08/2024    HCT 29.5 (L) 02/08/2024    MCV 89.4 02/08/2024     02/08/2024     Lab Results   Component Value Date    GLUCOSE 248 (H) 02/09/2024    BUN 53 (H) 02/09/2024    CREATININE 2.16 (H) 02/09/2024    EGFRIFNONA 61 01/06/2015    EGFRIFAFRI 62 02/13/2022    BCR 24.5 02/09/2024    CO2 15.0 (L) 02/09/2024    CALCIUM 7.6 (L) 02/09/2024    PROTENTOTREF 7.6 01/06/2015    ALBUMIN 4.0 02/05/2024    LABIL2 1.6 01/06/2015    AST 13 02/05/2024    ALT 13 02/05/2024     Sodium  136 - 145 mmol/L 151 High      Osmolality  275 - 295 mOsm/kg 340 High        Radiological Studies:   XR Chest 1 View    Result Date: 2/9/2024  XR CHEST 1 VW Date of Exam: 2/9/2024 1:31 AM EST Indication: Intubated Patient Comparison: Chest x-ray 2/8/2024 Findings: Stable medical support devices. Normal cardiomediastinal silhouette. Unchanged patchy parenchymal opacities throughout the mid and lower right lung compatible with pneumonia. No definite new or worsening airspace disease. No pleural effusion. No pneumothorax.     Impression: Unchanged right lung pneumonia. Electronically Signed: Gautam Minor MD  2/9/2024 7:56 AM EST  Workstation ID: [id number]    CT Head Without Contrast    Result Date: 2/9/2024  CT HEAD WO CONTRAST Date of Exam: 2/9/2024 4:58 AM EST Indication: Stroke, follow up. Comparison: Head CT 2/8/2024, 2/6/2024 Technique: Axial CT images were obtained of the head without contrast administration.  Automated exposure control and iterative construction methods were used. Findings: Redemonstration of acute intraparenchymal hemorrhage in the right temporal lobe which appears unchanged compared to yesterday's head CT. No new or worsening intracranial hemorrhage. There is continued evolution of  a large right MCA territory infarct with  confluent hypodensity throughout the right frontal and right parietal lobes. Continued evolution of an acute infarct in the right cerebellar hemisphere. Redemonstration of local and global mass effect including effacement of the right frontal and right temporal sulci; there is increased leftward midline shift now measuring approximately 8 mm, measuring approximately 4 mm on yesterday's head CT based on same day measurements made in similar fashion. No definite uncal herniation at this time. No tonsillar herniation. Similar effacement of the occipital horn of the right lateral ventricle and partial effacement of the right temporal horn. No evidence of intraventricular blood products or hydrocephalus. Unremarkable appearance of the orbits. The paranasal sinuses demonstrate scattered mucosal thickening of the ethmoid air cells. The mastoid air cells are clear. Partial visualization of NG/OG tube.     Impression: Continued evolution of large right MCA territory infarct with hemorrhagic conversion in the right temporal lobe. No new or worsening intracranial hemorrhage. There is increased global mass effect with worsening of leftward midline shift. No definite uncal herniation at this time. Evolving right cerebellar infarct. Electronically Signed: Gautam Minor MD  2/9/2024 7:32 AM EST  Workstation ID: [id number]    XR Chest 1 View    Result Date: 2/8/2024  XR CHEST 1 VW Date of Exam: 2/8/2024 1:05 AM EST Indication: Intubated Patient Comparison: 1 day prior. Findings: Support hardware projects unchanged. There is persistent, unchanged dense airspace disease on the right concerning for pneumonia. No new focal airspace disease is present elsewhere. There is no effusion or pneumothorax. Unchanged heart and mediastinal contours.     Impression: Support hardware projects unchanged. There is persistent, unchanged dense airspace disease on the right concerning for pneumonia. No new  focal airspace disease is present elsewhere. There is no effusion or pneumothorax. Unchanged heart and mediastinal contours. Electronically Signed: Arya Muñoz MD  2/8/2024 8:25 AM EST  Workstation ID: [id number]    CT Head Without Contrast    Result Date: 2/8/2024  CT HEAD WO CONTRAST Date of Exam: 2/8/2024 3:37 AM EST Indication: Stroke, follow up. Comparison: CT the head dated February 7, 2024 Technique: Axial CT images were obtained of the head without contrast administration.  Automated exposure control and iterative construction methods were used. Findings: There is an evolving right middle cerebral artery infarct with diffuse low-attenuation throughout the distribution of the MCA. Hemorrhagic conversion is noted within the anterior aspect of the right temporal lobe. The degree of surrounding vasogenic edema has increased. Mass effect on the anterior aspect of the anterior horn of the right lateral ventricle. Similarly, there is effacement of the posterior horn the right lateral ventricle. Left to right midline shift has increased from about 4 mm to 7 mm. The low-density area of the right cerebellum is more pronounced consistent with evolving right cerebellar infarct. Diffuse ethmoid sinus disease is stable.     Impression: 1.Evolving right middle cerebral artery infarct with hemorrhagic conversion of the anterior aspect of the right temporal lobe. There is increasing vasogenic edema and mass effect with increasing left to right midline shift from 4 mm to 7 mm. 2.Evolving right cerebellar infarct. Electronically Signed: Berhane Cano MD  2/8/2024 4:11 AM EST  Workstation ID: [id number]    XR Chest 1 View    Result Date: 2/7/2024  XR CHEST 1 VW Date of Exam: 2/7/2024 7:47 PM CST Indication: worsening hypoxia Comparison: 2/7/2024 at 5:22 a.m. Findings: Cardiomediastinal silhouette and support lines and tubes are unchanged. Improving aeration of the right lung with persistent right mid to lower lung airspace  "disease. No superimposed airspace disease no pneumothorax. There is a small right pleural effusion. No acute osseous abnormality identified.     Impression: Improving aeration of the right lung. Electronically Signed: Ramón Archibald MD  2/7/2024 8:21 PM CST  Workstation ID: [id number]    Cardiac Catheterization/Vascular Study    Result Date: 2/7/2024  Clinical Indication: 63-year-old female who presented to [facility] with right MCA syndrome and LVO stroke.  Her symptoms did improve in the ER, but this was with systolic BPs in the 230-240 range.  CT angiogram/CT perfusion scans demonstrated acute occlusion of a dominant M2 branch of the right middle cerebral artery, with ischemic \"penumbra\".  Given her high likelihood for neurologic deterioration with normalization of blood pressure, she is offered mechanical thrombectomy for her MCA occlusion. : Dr. Rambo Coronado. Access: Right common femoral artery. Estimated blood loss: 20 mL Complication: None apparent. Conscious sedation: Moderate sedation was provided by me for a total of 43 minutes.  Physical vitals were continuously monitored by an independently trained observer.  A total of 3 mg of Versed and 75 ug of fentanyl were administered intravenously. Procedures: 1.  Mechanical thrombectomy (Solitaire stent retriever) right middle cerebral artery (MCA) 2.  Placement of an arterial closure device. 3.  Conscious sedation. Technique: The patient was brought emergently to the cardiac catheterization suite.  The right groin region was prepped and draped in the usual, sterile fashion.  Local anesthesia with 1% lidocaine infiltration was achieved.  Additionally, intravenous fentanyl and Versed were given for patient comfort throughout the procedure, the details of which are documented in the nursing record.  Utilizing micropuncture technique, a 8 [place]awk vascular sheath was placed into the right common femoral artery without difficulty.  Subsequently, a 5 " "[place] Berenstein catheter was advanced into the aortic arch and used to select the right common carotid and internal carotid arteries under fluoroscopic guidance.  Appropriate angiographic sequences were filled following contrast injection.  Findings: Selective right internal carotid artery catheterization and carotid angiography: The cervical portions of the right carotid vasculature demonstrate mild atherosclerotic plaque formation, at the common carotid bifurcation, but without appreciable stenosis utilizing NASCET criteria.  There are no cervical dissections were ulcerative lesions.  No \"culprit\" lesions are identified.  The intracranial circulation was opacified via a right internal carotid injection, demonstrating abrupt occlusion of the dominant M2 branch of the right middle cerebral artery, consistent with an acute thromboembolic event.  This represents TICI 2A flow to the entirety of the right MCA vascular territory.  No additional large vessel occlusions are identified; however, moderate intracranial atherosclerotic disease is noted, but without flow-limiting lesion.  There are no changes of vasculitis or vasospasm.  The dural venous sinuses are patent. The Berenstein catheter was exchanged over a Cooley wire for an 8 [place]arian FlowGate balloon guide catheter which was positioned in the high cervical right internal carotid artery without difficulty. Under fluoroscopic guidance, a Phenom-21 microcatheter was advanced over Transcend EX soft tip microwire into the intracranial circulation and passed the M2 right MCA occlusion without difficulty.  Microcatheter injection confirms appropriate placement within an M2 branch distal to the embolism.  Next, a 3 mm Solitaire stent retriever was advanced through the microcatheter and deployed across the right MCA occlusion without difficulty.  After approximately 3 minute wait period, the Solitaire device and microcatheter were removed utilizing temporary balloon " "occlusion technique and vigorous aspiration of the balloon guide catheter.  This resulted in successful extraction of a small amount of thrombus.  Follow-up angiography demonstrates restoration of normal flow (TICI 3) to the right MCA vascular territory.  There were no emboli to new vascular territory identified.  No complicating features.  At the end of the procedure, all catheters and sheaths were removed from the groin and hemostasis was achieved at the puncture site utilizing manual compression and placement of a 8 [place]ian Angio-Seal arterial closure device. Impression: Abrupt occlusion of a dominant M2 branch of the right middle cerebral artery, consistent with an acute thromboembolic occlusion.  This responded well to mechanical thrombectomy (Solitaire stent retriever), restoring normal flow (TICI 3) to the right cerebral hemisphere.  There were no emboli to new vascular territory or complicating features.  There was no carotid \"culprit\" lesion, and the aforementioned thromboembolism is concerning for a cardiac thromboembolic source.     XR Abdomen KUB    Result Date: 2/7/2024  XR ABDOMEN KUB Date of Exam: 2/7/2024 4:53 AM EST Indication: sbft post-pyloric goal Comparison: 2/6/2024. Findings: Enteric tube tip is in the region of the pylorus or duodenal bulb. No bowel dilatation is present. Gastric stimulator device is again noted.     Impression: Feeding tube tip in the pylorus or duodenal bulb. Electronically Signed: Kerri Peters MD  2/7/2024 8:23 AM EST  Workstation ID: [id number]    XR Chest 1 View    Result Date: 2/7/2024  XR CHEST 1 VW Date of Exam: 2/7/2024 4:52 AM EST Indication: Intubated Patient Comparison: 2/6/2024 Findings: ET tube in satisfactory position. NG tube courses through the thorax. There are increasing multifocal airspace opacities, right greater than left with an increasing right effusion. Right upper extremity PICC line in satisfactory position.     Impression: 1. Increasing multifocal, " right greater than left opacities and increasing right effusion. Favor multifocal pneumonia. 2. Support apparatus in satisfactory position. Electronically Signed: Skip Valente MD  2/7/2024 8:13 AM EST  Workstation ID: [id number]    CT Head Without Contrast    Result Date: 2/7/2024  CT HEAD WO CONTRAST Date of Exam: 2/7/2024 12:56 AM EST Indication: Stroke, follow up. Comparison: CT scan of the head from February 6, 2024 at 1655 hours Technique: Axial CT images were obtained of the head without contrast administration.  Automated exposure control and iterative construction methods were used. Findings: There is an evolving right middle cerebral artery infarct with diffuse low-attenuation throughout the MCA distribution. Hemorrhagic conversion is noted within the anterior aspect of the right temporal lobe. There is surrounding diffuse cerebral edema. There is no significant midline shift. The ventricles are not significantly effaced. A low-attenuation area in the right cerebellum has developed in the interval. A new focus of infarct is not excluded. Chronic microvascular ischemic changes stable.     Impression: Evolving right middle cerebral artery distribution infarct with hemorrhagic conversion in the anterior aspect of the right temporal lobe. The overall appearance of the hemorrhagic component is unchanged and there is no evidence of new edema or mass effect. There may be a new developing right cerebellar subacute infarct. Electronically Signed: Berhane Cano MD  2/7/2024 2:09 AM EST  Workstation ID: [id number]    XR Abdomen KUB    Result Date: 2/6/2024  XR ABDOMEN KUB Date of Exam: 2/6/2024 5:38 PM CST Indication: kiofeed placement Comparison: None available. Findings: There is a gastric tube with tip in the peripyloric region. No evidence of intestinal obstruction. No acute osseous process identified. No abnormal calcifications are identified.  Moderate fecal load.     Impression: Enteric tube tip in the peripyloric  region. Electronically Signed: Ramón Archibald MD  2/6/2024 6:02 PM CST  Workstation ID: [id number]    XR Chest 1 View    Result Date: 2/6/2024  XR CHEST 1 VW Date of Exam: 2/6/2024 5:38 PM CST Indication: intubation Comparison: 2/5/2024 Findings: Cardiomediastinal silhouette is unremarkable. There is an endotracheal tube with tip approximately 2 cm from the jamel. There is an enteric tube with the most distal visualized portion overlying the proximal stomach. There is a right-sided PICC with tip  at the cavoatrial junction there is right lower lung airspace disease. No pneumothorax nor significant effusion. No acute osseous abnormality identified.     Impression: 1.Support lines and tubes as detailed above. 2.Right lower lobe airspace disease. Electronically Signed: Ramón Archibald MD  2/6/2024 5:59 PM CST  Workstation ID: [id number]    CT Head Without Contrast    Result Date: 2/6/2024  CT HEAD WO CONTRAST Date of Exam: 2/6/2024 4:50 PM EST Indication: s/p thrombectomy neuro decline Worsening stroke symptoms s/p thrombectomy. Comparison: 2/5/2024 Technique: Axial CT images were obtained of the head without contrast administration.  Automated exposure control and iterative construction methods were used. Findings: Hemorrhagic conversion right sided infarct with intraparenchymal hemorrhage within the right temporal lobe and large infarct of the right posterior frontal and right parietal lobes. Surrounding localized mass effect. No intraventricular extension. Extensive chronic small vessel/microangiopathic ischemic changes. The posterior fossa appears normal. Sellar and suprasellar structures are normal. Orbital and periorbital soft tissues are normal. The paranasal sinuses, ethmoid air cells, and mastoid air cells are aerated. The bony calvarium appears intact. No acute fractures. No lytic or blastic bony diseases.     Impression: Right MCA territory infarct with hemorrhagic conversion into the right temporal lobe.  Localized surrounding mass effect.. As of 5:01 p.m. on 2/6/2024 the care team is aware. Electronically Signed: Souleymane Boone MD  2/6/2024 5:04 PM EST  Workstation ID: [id number]    Adult Transthoracic Echo Complete W/ Cont if Necessary Per Protocol (With Agitated Saline)    Result Date: 2/6/2024    Left ventricular systolic function is normal. Left ventricular ejection fraction appears to be 61 - 65%.   Left ventricular wall thickness is consistent with moderate concentric hypertrophy.   Left atrial volume is mildly increased.   Saline test results are negative.     EEG    Result Date: 2/6/2024  Reason for referral: 63 y.o.female with left-sided weakness, speech changes okay Technical Summary:  A 19 channel digital EEG was performed using the international 10-20 placement system, including eye leads and EKG leads. Duration: 20 minutes Findings: The patient is drowsy throughout much of the study.  The background shows diffuse low to medium amplitude 5-7 Hz theta with an overlay of EMG artifact.  A clear posterior rhythm is not seen.  Stage II sleep is not clearly present.  No epileptiform activity or electrographic seizures are present.  Hyperventilation and photic stimulation are not performed.  No electrographic seizures are present. Video: Available Technical quality: Superior EKG: Regular, 80 bpm SUMMARY: Generalized slow Excess drowsiness No focal features or epileptiform activity are seen     Diffuse cerebral dysfunction of mild degree, nonspecific No evidence for epilepsy is seen This report is transcribed using the Dragon dictation system.      XR Abdomen KUB    Result Date: 2/6/2024  XR ABDOMEN KUB Date of Exam: 2/6/2024 2:56 PM EST Indication: ng tube placement Comparison: None available. Findings: Single portable supine view. There is an NG tube with its tip in the distal gastric antrum or pylorus. No bowel dilatation is seen.     Impression: NG tube tip in the distal stomach. Electronically Signed: Kerri  MD Fran  2/6/2024 3:05 PM EST  Workstation ID: [id number]    CT Angiogram Head w AI Analysis of LVO    Result Date: 2/5/2024  CT ANGIOGRAM HEAD W AI ANALYSIS OF LVO, CT ANGIOGRAM NECK, CT CEREBRAL PERFUSION W WO CONTRAST Date of Exam: 2/5/2024 8:46 PM EST Indication: Neuro deficit, acute stroke suspected Neuro deficit, acute stroke suspected. Comparison: Concurrently performed noncontrast head CT. Technique: Axial CT images of the brain were obtained prior to and after the administration of 150 mL Isovue-370. CT Perfusion protocol was utilized. Automated post processing was performed by RAPID software and submitted to PACS for interpretation.  CTA  of the head and neck were performed after the administration of iodinated contrast.  Reconstructed coronal and sagittal images were also obtained. A 3-D volume rendered image was created for interpretation. Automated exposure control and iterative reconstruction methods were used. Findings: CT Perfusion: No evidence of core infarct. Ischemic penumbra present within the right MCA territory, however unable to quantify given background of generalized hypoperfusion. Vascular findings: Visualized portions of the aortic arch are patent without evidence of significant ostial stenosis. Common carotid arteries are patent bilaterally. Mixed atheromatous disease of the bilateral carotid artery bifurcations without significant stenosis by NASCET criteria. The cervical portions of the internal carotid arteries are patent bilaterally. The intracranial portions of the bilateral internal carotid arteries are patent with atherosclerotic disease present. Abrupt vessel cut off of the proximal M2  segment of the right MCA. Left MCA is patent. 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 The vertebral arteries are patent bilaterally. The basilar artery is patent. The posterior cerebral arteries are patent  bilaterally. No evidence of dissection or aneurysm. Nonvascular findings: Lung apices are grossly clear with mild paraseptal emphysematous changes present. 1.5 cm right thyroid nodule. Patient is edentulous. Aerodigestive structures appear within normal limits. No suspicious lymphadenopathy. Degenerative changes of the cervical spine with reversal of the normal cervical lordosis. No acute or suspicious osseous lesions.     Impression: Occlusion of the proximal M2 segment of the right MCA. No evidence of core infarct with ischemic penumbra present within the right MCA territory, however unable to quantify given background of generalized hypoperfusion. Findings discussed with Gautam of the stroke team at 9:34 p.m. on 2/5/2024. Electronically Signed: Raymond Garcia MD  2/5/2024 9:56 PM EST  Workstation ID: [id number]    CT CEREBRAL PERFUSION WITH & WITHOUT CONTRAST    Result Date: 2/5/2024  CT ANGIOGRAM HEAD W AI ANALYSIS OF LVO, CT ANGIOGRAM NECK, CT CEREBRAL PERFUSION W WO CONTRAST Date of Exam: 2/5/2024 8:46 PM EST Indication: Neuro deficit, acute stroke suspected Neuro deficit, acute stroke suspected. Comparison: Concurrently performed noncontrast head CT. Technique: Axial CT images of the brain were obtained prior to and after the administration of 150 mL Isovue-370. CT Perfusion protocol was utilized. Automated post processing was performed by RAPID software and submitted to PACS for interpretation.  CTA  of the head and neck were performed after the administration of iodinated contrast.  Reconstructed coronal and sagittal images were also obtained. A 3-D volume rendered image was created for interpretation. Automated exposure control and iterative reconstruction methods were used. Findings: CT Perfusion: No evidence of core infarct. Ischemic penumbra present within the right MCA territory, however unable to quantify given background of generalized hypoperfusion. Vascular findings: Visualized portions of the aortic  arch are patent without evidence of significant ostial stenosis. Common carotid arteries are patent bilaterally. Mixed atheromatous disease of the bilateral carotid artery bifurcations without significant stenosis by NASCET criteria. The cervical portions of the internal carotid arteries are patent bilaterally. The intracranial portions of the bilateral internal carotid arteries are patent with atherosclerotic disease present. Abrupt vessel cut off of the proximal M2  segment of the right MCA. Left MCA is patent. 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 The vertebral arteries are patent bilaterally. The basilar artery is patent. The posterior cerebral arteries are patent bilaterally. No evidence of dissection or aneurysm. Nonvascular findings: Lung apices are grossly clear with mild paraseptal emphysematous changes present. 1.5 cm right thyroid nodule. Patient is edentulous. Aerodigestive structures appear within normal limits. No suspicious lymphadenopathy. Degenerative changes of the cervical spine with reversal of the normal cervical lordosis. No acute or suspicious osseous lesions.     Impression: Occlusion of the proximal M2 segment of the right MCA. No evidence of core infarct with ischemic penumbra present within the right MCA territory, however unable to quantify given background of generalized hypoperfusion. Findings discussed with Gautam of the stroke team at 9:34 p.m. on 2/5/2024. Electronically Signed: Raymond Garcia MD  2/5/2024 9:56 PM EST  Workstation ID: [id number]    CT Angiogram Neck    Result Date: 2/5/2024  CT ANGIOGRAM HEAD W AI ANALYSIS OF LVO, CT ANGIOGRAM NECK, CT CEREBRAL PERFUSION W WO CONTRAST Date of Exam: 2/5/2024 8:46 PM EST Indication: Neuro deficit, acute stroke suspected Neuro deficit, acute stroke suspected. Comparison: Concurrently performed noncontrast head CT. Technique: Axial CT images of the brain were  obtained prior to and after the administration of 150 mL Isovue-370. CT Perfusion protocol was utilized. Automated post processing was performed by RAPID software and submitted to PACS for interpretation.  CTA  of the head and neck were performed after the administration of iodinated contrast.  Reconstructed coronal and sagittal images were also obtained. A 3-D volume rendered image was created for interpretation. Automated exposure control and iterative reconstruction methods were used. Findings: CT Perfusion: No evidence of core infarct. Ischemic penumbra present within the right MCA territory, however unable to quantify given background of generalized hypoperfusion. Vascular findings: Visualized portions of the aortic arch are patent without evidence of significant ostial stenosis. Common carotid arteries are patent bilaterally. Mixed atheromatous disease of the bilateral carotid artery bifurcations without significant stenosis by NASCET criteria. The cervical portions of the internal carotid arteries are patent bilaterally. The intracranial portions of the bilateral internal carotid arteries are patent with atherosclerotic disease present. Abrupt vessel cut off of the proximal M2  segment of the right MCA. Left MCA is patent. 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 The vertebral arteries are patent bilaterally. The basilar artery is patent. The posterior cerebral arteries are patent bilaterally. No evidence of dissection or aneurysm. Nonvascular findings: Lung apices are grossly clear with mild paraseptal emphysematous changes present. 1.5 cm right thyroid nodule. Patient is edentulous. Aerodigestive structures appear within normal limits. No suspicious lymphadenopathy. Degenerative changes of the cervical spine with reversal of the normal cervical lordosis. No acute or suspicious osseous lesions.     Impression: Occlusion of the  proximal M2 segment of the right MCA. No evidence of core infarct with ischemic penumbra present within the right MCA territory, however unable to quantify given background of generalized hypoperfusion. Findings discussed with Gautam of the stroke team at 9:34 p.m. on 2/5/2024. Electronically Signed: Raymond Garcia MD  2/5/2024 9:56 PM EST  Workstation ID: [id number]    XR Chest 1 View    Result Date: 2/5/2024  XR CHEST 1 VW Date of Exam: 2/5/2024 9:39 PM EST Indication: Acute Stroke Protocol (onset < 12 hrs) Comparison: None available. Findings: No focal consolidation. No pneumothorax or pleural effusion. Cardiac size is normal. The visualized clavicles appear intact. No displaced rib fractures. The visualized upper abdomen is normal.     Impression: No acute cardiopulmonary disease. Electronically Signed: Souleymane Boone MD  2/5/2024 9:51 PM EST  Workstation ID: [id number]    CT Head Without Contrast Stroke Protocol    Result Date: 2/5/2024  CT HEAD WO CONTRAST STROKE PROTOCOL Date of Exam: 2/5/2024 8:42 PM EST Indication: Neuro deficit, acute, stroke suspected. Comparison: Brain MRI and head CT 2/4/2023. Technique: Axial CT images were obtained of the head without contrast administration.  Reconstructed coronal images were also obtained. Automated exposure control and iterative construction methods were used. Scan Time: 20:42 Results discussed with the clinical team via telephone by Raymond Garcia MD at 20:45. Findings: No evidence of acute intracranial hemorrhage or mass effect. No extra-axial collection. The gray white matter differentiation is preserved. Global parenchymal atrophy. Periventricular and subcortical white matter hypodensity, nonspecific, but likely sequela of chronic small vessel ischemic disease. Chronic infarcts in the left cerebellum and right basal ganglia. The mastoid air cells and paranasal sinuses are well aerated. Globes and extraocular muscles are unremarkable. No acute or suspicious osseous  abnormality. Soft tissues within normal limits.     Impression: No evidence of acute intracranial abnormality. Similar chronic findings, including advanced white matter change that is likely sequela of chronic small vessel ischemic disease. Electronically Signed: Raymond Garcia MD  2/5/2024 8:52 PM EST  Workstation ID: [id number]       During this visit the following were done:  Labs Reviewed [x]    Labs Ordered []    Radiology Reports Reviewed [x]    Radiology Ordered []    EKG, echo, and/or stress test reviewed []    EEG results reviewed  [x]    EEG reviewed and interpreted per myself   []    Discussed case with neurointerventionalist or neuroradiologist []    Referring Provider Records Reviewed []    ER Records Reviewed []    Hospital Records Reviewed []    History Obtained From Family []    Radiological images view and Interpreted per myself [x]    Case Discussed with referring provider []     Decision to obtain and request outside records  []        Assessment and Plan     Large acute right MCA stroke, s/p M2 MT, with reperfusion hemorrhage and edema, worsening midline shift, new right cerebellar stroke. ICH stable. EEG w/o epileptic changes. Likely cause PAF. Respiratory failure, aspiration PNA. Increasingly poor prognosis for survival and functional recovery. Patient is now DNR.   - Appreciate palliative medicine help. Consider comfort care.   - Wean sedation and vent as tolerated.   - BP<140/90, use Cardene prn.   - Stopped aspirin.   - Daily CT head.   - Continue 3% saline, sodium goal 150-155.              Electronically signed by Genaro Solomon MD on 2/9/2024 at 11:41 EST

## 2024-02-09 NOTE — PROGRESS NOTES
Intensive Care Follow-up     Hospital:  LOS: 4 days   Ms. Thelma Michael, 63 y.o. female is followed for:   CVA (cerebral vascular accident)            History of present illness:   Thelma Michael is a 63 y.o. female who has a PMHX of T1DM, GERD, TIAs, HLD, HTN, & AF for which she is on chronic anticoagulation (Xarelto).     Patient presented to ED via EMS w/ left sided weakness, facial droop, & slurred speech originating @ ~ 1940.  NIHSS 7.  Neuroimaging showed M2 occlusion of proximal R MCA.  Not a TNK candidate given anticoagulant use so was taken for catheter based intervention by Dr. Coronado who performed mechanical thrombectomy of a right MCA occlusion.      She is admitted to the ICU after the procedure.       Subjective   Interval History:  Patient stable this morning.  Remains on high ventilator settings.  Has significant ammonia.  Spiking temps currently.  Started on antibiotics though 2 days ago, with a known aspiration event.             The patient's past medical, surgical and social history were reviewed and updated in Epic as appropriate.       Objective     Infusions:  niCARdipine, 5-15 mg/hr, Last Rate: 5 mg/hr (02/09/24 0841)  propofol, 5-50 mcg/kg/min, Last Rate: 45 mcg/kg/min (02/09/24 1134)  sodium chloride, 15 mL/hr, Last Rate: 15 mL/hr (02/09/24 1041)      Medications:  atorvastatin, 80 mg, Nasogastric, Nightly  cefTRIAXone, 1,000 mg, Intravenous, Q24H  chlorhexidine, 15 mL, Mouth/Throat, Q12H  insulin detemir, 20 Units, Subcutaneous, Daily  insulin regular, 3-14 Units, Subcutaneous, Q6H  insulin regular, 5 Units, Subcutaneous, Q6H  pantoprazole, 40 mg, Intravenous, Q24H  senna-docusate sodium, 2 tablet, Nasogastric, BID      I reviewed the patient's medications.    Vital Sign Min/Max for last 24 hours  Temp  Min: 98.5 °F (36.9 °C)  Max: 102 °F (38.9 °C)   BP  Min: 107/50  Max: 171/68   Pulse  Min: 78  Max: 112   Resp  Min: 26  Max: 33   SpO2  Min: 91 %  Max: 98 %   No data recorded        Input/Output for last 24 hour shift  02/08 0701 - 02/09 0700  In: 3723.3 [I.V.:2461.3]  Out: 930 [Urine:930]   Mode: VC+/AC  FiO2 (%):  [55 %-60 %] 55 %  S RR:  [16] 16  S VT:  [400 mL] 400 mL  PEEP/CPAP (cm H2O):  [10 cm H20] 10 cm H20  MAP (cm H2O):  [12-20] 17  GENERAL : Sedated, lying in bed, NAD  RESPIRATORY/THORAX : ET tube in place, Coarse breath sounds bilaterally  CARDIOVASCULAR : Normal S1/S2, RRR. 1+ lower ext edema.  GASTROINTESTINAL : Soft, NT/ND. BS x 4 normoactive. No hepatosplenomegaly.  MUSCULOSKELETAL : No cyanosis, clubbing, or ischemia  NEUROLOGICAL: Sedated    Results from last 7 days   Lab Units 02/08/24  0257 02/06/24  0330 02/05/24  2124   WBC 10*3/mm3 8.90 12.97* 7.63   HEMOGLOBIN g/dL 10.0* 10.4* 11.6*   PLATELETS 10*3/mm3 192 269 334     Results from last 7 days   Lab Units 02/09/24  1204 02/09/24  0636 02/09/24  0029 02/08/24  1859 02/08/24  1204 02/08/24  0725 02/08/24  0257 02/06/24  0331   SODIUM mmol/L 152* 151* 151* 152*   < > 148* 147* 142   POTASSIUM mmol/L  --  4.1  --  3.6  --  3.9 3.9 3.9   CO2 mmol/L  --  15.0*  --  16.0*  --  16.0* 16.0* 20.0*   BUN mg/dL  --  53*  --  50*  --  42* 44* 37*   CREATININE mg/dL  --  2.16*  --  1.95*  --  1.81* 1.87* 1.64*   MAGNESIUM mg/dL  --   --   --   --   --   --  1.6 1.9   PHOSPHORUS mg/dL  --   --   --   --   --   --  3.6 3.4   GLUCOSE mg/dL  --  248*  --  172*  --  266* 250* 549*    < > = values in this interval not displayed.     Estimated Creatinine Clearance: 28.9 mL/min (A) (by C-G formula based on SCr of 2.16 mg/dL (H)).    Results from last 7 days   Lab Units 02/09/24  0327   PH, ARTERIAL pH units 7.411   PCO2, ARTERIAL mm Hg 26.6*   PO2 ART mm Hg 68.3*       I reviewed the patient's new clinical results.  I reviewed the patient's new imaging results/reports including actual images and agree with reports.       Imaging Results (Last 24 Hours)       Procedure Component Value Units Date/Time    XR Chest 1 View [[id number]]  Collected: 02/09/24 0755     Updated: 02/09/24 0759    Narrative:      XR CHEST 1 VW    Date of Exam: 2/9/2024 1:31 AM EST    Indication: Intubated Patient    Comparison: Chest x-ray 2/8/2024    Findings:  Stable medical support devices. Normal cardiomediastinal silhouette. Unchanged patchy parenchymal opacities throughout the mid and lower right lung compatible with pneumonia. No definite new or worsening airspace disease. No pleural effusion. No   pneumothorax.      Impression:      Impression:  Unchanged right lung pneumonia.      Electronically Signed: Gautam Minor MD    2/9/2024 7:56 AM EST    Workstation ID: [id number]    CT Head Without Contrast [[id number]] Collected: 02/09/24 0721     Updated: 02/09/24 0735    Narrative:      CT HEAD WO CONTRAST    Date of Exam: 2/9/2024 4:58 AM EST    Indication: Stroke, follow up.    Comparison: Head CT 2/8/2024, 2/6/2024    Technique: Axial CT images were obtained of the head without contrast administration.  Automated exposure control and iterative construction methods were used.      Findings:  Redemonstration of acute intraparenchymal hemorrhage in the right temporal lobe which appears unchanged compared to yesterday's head CT. No new or worsening intracranial hemorrhage. There is continued evolution of a large right MCA territory infarct with   confluent hypodensity throughout the right frontal and right parietal lobes. Continued evolution of an acute infarct in the right cerebellar hemisphere. Redemonstration of local and global mass effect including effacement of the right frontal and right   temporal sulci; there is increased leftward midline shift now measuring approximately 8 mm, measuring approximately 4 mm on yesterday's head CT based on same day measurements made in similar fashion. No definite uncal herniation at this time. No   tonsillar herniation. Similar effacement of the occipital horn of the right lateral ventricle and partial effacement of the right  temporal horn. No evidence of intraventricular blood products or hydrocephalus. Unremarkable appearance of the orbits. The   paranasal sinuses demonstrate scattered mucosal thickening of the ethmoid air cells. The mastoid air cells are clear. Partial visualization of NG/OG tube.      Impression:      Impression:  Continued evolution of large right MCA territory infarct with hemorrhagic conversion in the right temporal lobe. No new or worsening intracranial hemorrhage. There is increased global mass effect with worsening of leftward midline shift. No definite   uncal herniation at this time.    Evolving right cerebellar infarct.        Electronically Signed: Gautam Minor MD    2/9/2024 7:32 AM EST    Workstation ID: [id number]            Assessment & Plan   Impression        Right MCA CVA    Diabetic peripheral neuropathy    Hyperlipidemia    Hypertension    Heart valve disease    Uncontrolled type 1 diabetes mellitus with hyperglycemia    Gastroparesis    PFO (patent foramen ovale)    Atrial fibrillation and flutter    Acute-on-CKD (chronic kidney disease) stage 3, GFR 30-59 ml/min    Nontraumatic hemorrhage of right cerebral hemisphere    Acute respiratory failure with hypoxia    Aspiration pneumonia of right lower lobe due to vomit       Plan        Ms. Michael is a 64yo F with a history of T1DM (insulin pump), GERD, TIA, HLD, HTN and Afib on Xarelto who presented to the [facility] ED via EMS on 2/5/24 with left sided weakness, facial droop, and slurred speech. Imaging showed a M2 occlusion of the proximal right MCA.  She was not a candidate for TNK secondary to anticoagulation. She was taken to the cath lab by Dr. Coronado where she underwent mechanical thrombectomy of the right MCA.  Acute decompensation occurred on 2/6/2024 with right MCA having a hemorrhagic conversion, worsening mental status and eventually aspiration event leading to intubation and mechanical ventilation.     -Continue mechanical ventilation we will  wean to saturation greater than 88%  -Propofol for sedation  -Continue ceftriaxone for aspiration pneumonia, will go ahead and add Flagyl as well given the temperature although the increase that may be related to neurologic injury as well.  -Stroke management per neurology, sodium now 150, goal to maintain between 145 and 155 for cerebral edema  -Continue subcu insulin with a goal blood glucose less than 180  -Goal blood pressure with systolic less than 140 can utilize nicardipine if needed.  -Continue statin  -Holding anticoagulation due to hemorrhagic conversion  -Continue tube feeds  -SCDs for DVT prophylaxis  -A.m. labs   -Patient's family has informed us that the patient has a DNR/DNI and the patient's son is the POA and he plans to withdraw care if she is not better within a few days.    I conducted multidisciplinary rounds in the plan of care was discussed with the multidisciplinary team at that time. In attendance at multidisciplinary rounds was clinical pharmacist, dietitian, nursing staff, and case management.    I discussed the patient's findings and my recommendations with patient and nursing staff     Critical Care time spent in direct patient care: 35 minutes (excluding procedure time, if applicable) including high complexity decision making to assess, manipulate, and support vital organ system failure in this individual who has impairment of one or more vital organ systems such that there is a high probability of imminent or life threatening deterioration in the patient's condition.      Trey Norton, DO  Pulmonary, Critical care and Sleep Medicine

## 2024-02-09 NOTE — PLAN OF CARE
Goal Outcome Evaluation: Pt spontaneously blinks eyes when physically stimulated while sedation is paused. Does not follow commands or track visually. Withdrawals in all extremities. Cardene drip and prn antihypertensives used to maintain SBP < 140mmHg. Additional long acting and fast acting insulin given for glucose control. Weaning FiO2. Palliative talked with son who is POA and code status was changed. Q6 hour Na's monitored for hypertonic solution. Na goal 150-155.     Problem: Adult Inpatient Plan of Care  Goal: Absence of Hospital-Acquired Illness or Injury  Intervention: Identify and Manage Fall Risk  Recent Flowsheet Documentation  Taken 2/9/2024 1800 by Manjinder Huerta RN  Safety Promotion/Fall Prevention:   safety round/check completed   activity supervised  Taken 2/9/2024 1600 by Manjinder Huerta RN  Safety Promotion/Fall Prevention:   safety round/check completed   activity supervised  Taken 2/9/2024 1400 by Manjinder Huerta RN  Safety Promotion/Fall Prevention:   safety round/check completed   activity supervised  Taken 2/9/2024 1200 by Manjinder Huerta RN  Safety Promotion/Fall Prevention:   safety round/check completed   activity supervised  Taken 2/9/2024 1000 by Manjinder Huerta RN  Safety Promotion/Fall Prevention: safety round/check completed  Taken 2/9/2024 0800 by Manjinder Huerta RN  Safety Promotion/Fall Prevention:   safety round/check completed   activity supervised     Problem: Adult Inpatient Plan of Care  Goal: Absence of Hospital-Acquired Illness or Injury  Intervention: Prevent Skin Injury  Recent Flowsheet Documentation  Taken 2/9/2024 1800 by Manjinder Huerta, RN  Body Position:   weight shifting   left  Taken 2/9/2024 1600 by Manjinder Huerta RN  Body Position:   weight shifting   right  Skin Protection:   tubing/devices free from skin contact   incontinence pads utilized  Taken 2/9/2024 1400 by Manjinder Huerta RN  Body Position:   weight shifting   left  Taken 2/9/2024 1200 by  Manjinder Huerta RN  Body Position:   weight shifting   right  Taken 2/9/2024 1000 by Manjinder Huerta RN  Body Position:   weight shifting   left  Taken 2/9/2024 0800 by Manjinder Huerta RN  Body Position:   weight shifting   right  Skin Protection:   tubing/devices free from skin contact   incontinence pads utilized     Problem: Adult Inpatient Plan of Care  Goal: Absence of Hospital-Acquired Illness or Injury  Intervention: Prevent and Manage VTE (Venous Thromboembolism) Risk  Recent Flowsheet Documentation  Taken 2/9/2024 1800 by Manjinder Huerta RN  Activity Management: bedrest  Taken 2/9/2024 1600 by Manjinder Huerta RN  Activity Management: bedrest  VTE Prevention/Management:   sequential compression devices on   bilateral  Range of Motion: ROM (range of motion) performed  Taken 2/9/2024 1400 by Manjinder Huerta RN  Activity Management: bedrest  Taken 2/9/2024 1200 by Manjinder Huerta RN  Activity Management: bedrest  VTE Prevention/Management:   sequential compression devices on   bilateral  Taken 2/9/2024 1000 by Manjinder Huerta RN  Activity Management: bedrest  Taken 2/9/2024 0800 by Manjinder Huerta RN  Activity Management: bedrest  VTE Prevention/Management:   sequential compression devices on   bilateral

## 2024-02-10 NOTE — PROGRESS NOTES
INTENSIVIST   PROGRESS NOTE     Hospital:  LOS: 5 days     Ms. Thelma Michael, 63 y.o. female is followed for a Chief Complaint of: CVA      Subjective   S     Interval History:  No acute events. Remains on mechanical ventilation with FiO2 55% and PEEP 10.        The patient's relevant past medical, surgical and social history were reviewed and updated in Epic as appropriate.      ROS: Unable to obtain secondary to mental status and mechanical ventilation.     Objective   O     Vitals:  Temp  Min: 100.6 °F (38.1 °C)  Max: 101.4 °F (38.6 °C)  BP  Min: 104/54  Max: 161/67  Pulse  Min: 78  Max: 103  Resp  Min: 25  Max: 32  SpO2  Min: 91 %  Max: 99 % No data recorded    Intake/Ouptut 24 hrs (7:00AM - 6:59 AM)  Intake & Output (last 3 days)         02/07 0701  02/08 0700 02/08 0701  02/09 0700 02/09 0701  02/10 0700 02/10 0701 02/11 0700    I.V. (mL/kg) 642.5 (9.4) 2461.3 (35) 2913.6 (40.6) 363 (5.1)    Other 60 270 180 0    NG/ 892 937 69    IV Piggyback 100 100 200     Total Intake(mL/kg) 1101.5 (16.1) 3723.3 (52.9) 4230.6 (59) 432 (6)    Urine (mL/kg/hr) 1020 (0.6) 930 (0.6) 715 (0.4) 60 (0.2)    Emesis/NG output        Stool        Total Output 1020 930 715 60    Net +81.5 +2793.3 +3515.6 +372                    Medications (drips):  niCARdipine, Last Rate: 10 mg/hr (02/10/24 0936)  propofol, Last Rate: 45 mcg/kg/min (02/10/24 0541)  sodium chloride, Last Rate: 15 mL/hr (02/09/24 2042)        Mechanical Ventilator Settings:          Resp Rate (Set): 16     FiO2 (%): 55 %  PEEP/CPAP (cm H2O): 10 cm H20    Minute Ventilation (L/min) (Obs): 13.1 L/min  Resp Rate (Observed) Vent: 30  I:E Ratio (Set): 1:2.95  I:E Ratio (Obs): 1:1    PIP Observed (cm H2O): 17 cm H2O  Plateau Pressure (cm H2O): (S) 14 cm H2O    Physical Examination  Telemetry:  Normal sinus rhythm.    Constitutional:  No acute distress.  ETT in place on mechanical ventilation.    Eyes: No scleral icterus.   PERRL, EOM intact.    Neck:  Supple,  FROM   Cardiovascular: Normal rate, regular and rhythm. Normal heart sounds.  No murmurs, gallop or rub.   Respiratory: No respiratory distress. Normal respiratory effort.  Rhonchi on the right.    Abdominal:  Soft. No masses. Nontender. No distension. No HSM.   Extremities: No digital cyanosis. No clubbing.  No peripheral edema.   Skin: No rashes, lesions or ulcers   Neurological:   Sedated.        Interval:  (shift change)  1a. Level of Consciousness: 2-->Not alert, requires repeated stimulation to attend, or is obtunded and requires strong or painful stimulation to make movements (not stereotyped)  1b. LOC Questions: 2-->Answers neither question correctly  1c. LOC Commands: 2-->Performs neither task correctly  2. Best Gaze: 2-->Forced deviation, or total gaze paresis not overcome by the oculocephalic maneuver  3. Visual: 2-->Complete hemianopia  4. Facial Palsy: 1-->Minor paralysis (flattened nasolabial fold, asymmetry on smiling)  5a. Motor Arm, Left: 3-->No effort against gravity, limb falls  5b. Motor Arm, Right: 3-->No effort against gravity, limb falls  6a. Motor Leg, Left: 3-->No effort against gravity, leg falls to bed immediately  6b. Motor Leg, Right: 3-->No effort against gravity, leg falls to bed immediately  7. Limb Ataxia: 0-->Absent  8. Sensory: 1-->Mild-to-moderate sensory loss, patient feels pinprick is less sharp or is dull on the affected side, or there is a loss of superficial pain with pinprick, but patient is aware of being touched  9. Best Language: 3-->Mute, global aphasia, no usable speech or auditory comprehension  10. Dysarthria: (UN) Intubated or other physical barrier  11. Extinction and Inattention (formerly Neglect): 1-->Visual, tactile, auditory, spatial, or personal inattention or extinction to bilateral simultaneous stimulation in one of the sensory modalities    Total (NIH Stroke Scale): 28         Results from last 7 days   Lab Units 02/08/24  0257 02/06/24  0330 02/05/24  1011    WBC 10*3/mm3 8.90 12.97* 7.63   HEMOGLOBIN g/dL 10.0* 10.4* 11.6*   MCV fL 89.4 90.7 90.3   PLATELETS 10*3/mm3 192 269 334     Results from last 7 days   Lab Units 02/10/24  0951 02/10/24  0031 02/09/24  1803 02/09/24  1204 02/09/24  0636 02/08/24  0725 02/08/24  0257 02/06/24  0331   SODIUM mmol/L 151* 150* 152*   < > 151*   < > 147* 142   POTASSIUM mmol/L 4.1  --  4.3  --  4.1   < > 3.9 3.9   CO2 mmol/L 15.0*  --  15.0*  --  15.0*   < > 16.0* 20.0*   CREATININE mg/dL 2.67*  --  2.18*  --  2.16*   < > 1.87* 1.64*   GLUCOSE mg/dL 212*  --  228*  --  248*   < > 250* 549*   MAGNESIUM mg/dL 2.0  --   --   --   --   --  1.6 1.9   PHOSPHORUS mg/dL 2.9  --   --   --   --   --  3.6 3.4    < > = values in this interval not displayed.     Estimated Creatinine Clearance: 23.8 mL/min (A) (by C-G formula based on SCr of 2.67 mg/dL (H)).  Results from last 7 days   Lab Units 02/10/24  0951 02/09/24 1803 02/05/24 2124   ALK PHOS U/L 146* 127* 119*   BILIRUBIN mg/dL 0.2 0.2 0.3   BILIRUBIN DIRECT mg/dL 0.2 <0.2  --    ALT (SGPT) U/L 68* 30 13   AST (SGOT) U/L 160* 53* 13       Results from last 7 days   Lab Units 02/09/24  0327 02/08/24  0359 02/07/24  1848 02/07/24  0353 02/06/24  1957   PH, ARTERIAL pH units 7.411 7.408 7.467* 7.468* 7.419   PCO2, ARTERIAL mm Hg 26.6* 28.5* 26.4* 25.0* 33.7*   PO2 ART mm Hg 68.3* 73.2* 51.9* 48.7* 195.0*   FIO2 % 60 70 70 70 100       Images:  Imaging Results (Last 24 Hours)       Procedure Component Value Units Date/Time    CT Head Without Contrast [[id number]] Resulted: 02/10/24 0423     Updated: 02/10/24 0440               Results: Reviewed.  I reviewed the patient's new laboratory and imaging results.  I independently reviewed the patient's new images.    Medications: Reviewed.    Assessment & Plan   A / P     Ms. Micheal is a 64yo F with a history of t1DM, GERD, TIA, HLD, HTN, and Afib on Xarelto who presented to [facility] on 2/5/24 via EMS with left sided weakness, facial droop and slurred  speech originated at 1740. Initial NIHSS was a 7. Neuroimaging showed an M2 occlusion of the proximal RMCA. She was not a candidate for TNK secondary to anticoagulation and was taken for a mechanical thrombectomy by Dr. Coronado.     She was admitted to the ICU after the procedure.    She decompensated on 2/6/24 and was found to have hemorrhagic conversion. She ultimately aspirated and required intubation with mechanical ventilation.     She was started on empiric antibiotics with Rocephin and Flagyl. She initially required very high levels of mechanical ventilation.     She is currently on FiO2 of 55% and PEEP of 10. NIHSS this AM is a 28.    Nutrition:   NPO Diet NPO Type: Strict NPO  Advance Directives:   Code Status and Medical Interventions:   Ordered at: 02/09/24 0955     Level Of Support Discussed With:    Health Care Surrogate     Code Status (Patient has no pulse and is not breathing):    No CPR (Do Not Attempt to Resuscitate)     Medical Interventions (Patient has pulse or is breathing):    Full Support       Active Hospital Problems    Diagnosis     **Right MCA CVA     Nontraumatic hemorrhage of right cerebral hemisphere     Acute respiratory failure with hypoxia     Aspiration pneumonia of right lower lobe due to vomit     Acute-on-CKD (chronic kidney disease) stage 3, GFR 30-59 ml/min     PFO (patent foramen ovale)     Atrial fibrillation and flutter     Gastroparesis     Uncontrolled type 1 diabetes mellitus with hyperglycemia     Heart valve disease     Hyperlipidemia     Hypertension     Diabetic peripheral neuropathy        Assessment / Plan:    Continue mechanical ventilation. Titration of FiO2 and PEEP  Propofol for sedation.   Rocephin and Flagyl for aspiration pneumonia. Cultures have been negative.   Neurology following for stroke management. Goal sodium 145-155. Currently 150 this AM.   Continue tube feeds  Insulin for management of diabetes.   SCDs for DVT ppx.  AM labs and CXR    Critically ill  secondary severe CVA with management of mechanical ventilation.     High level of risk due to:  severe exacerbation of chronic illness and illness with threat to life or bodily function.    Plan of care and goals reviewed during interdisciplinary rounds.  I discussed the patient's findings and my recommendations with family and nursing staff    Critical Care Time: was greater than 32 minutes.(This excludes time spent performing separately reportable procedures and services). including high complexity decision making to assess, manipulate, and support vital organ system failure in this individual who has impairment of one or more vital organ systems such that there is a high probability of imminent or life threatening deterioration in the patient’s condition.      Silvia Case, DO    Intensive Care Medicine and Pulmonary Medicine

## 2024-02-10 NOTE — SIGNIFICANT NOTE
Called by nursing staff to evaluate patient to suddenly dropped her blood pressure.  Patient's blood pressure was running in 150s systolic and was given as needed dose of labetalol as goal was to keep systolic blood pressure less than 140.  Patient apparently dropped her heart rate into 20s and given dose of atropine with improvement and blood pressure pretty significantly into 80s.  Also became more encephalopathic and unable to withdraw to pain.  Patient also developed sudden hypoxemia and requiring 100% FiO2 and PEEP was increased to 10.  We ordered stat chest x-ray which showed worsening right basilar opacity.  Patient was lavaged and suctioned with minimal amount of secretions.  ABG obtained and showed metabolic acidosis.  Patient had worsening renal function earlier.  Base deficit of 13.  Blood pressure is 90 systolic.  Patient was given 2 A of bicarb with blood pressure improving to 110.  Ventilator adjustments made and oxygen saturation improved to 97% currently on 100% FiO2.  I was able to wean down FiO2 to 80% and PEEP to 8.  Concern would be any worsening neurological status or herniation.  Neurology team is aware and planning to get CT scan of the head.  We will stabilize the patient from cardiorespiratory standpoint before moving her to CT scanner.  Unable to reach patient's son who is POA.  Other family members in the room and they were updated.  They are trying to get rest of the family in.  Patient is DNR status.  Will continue close monitoring.     Detail Level: Detailed Dehisced. Dermabond appled, steroid strips and telfa and hypafix. \\nCan fraxel in 4 weeks

## 2024-02-10 NOTE — PROGRESS NOTES
Stroke Progress Note       Chief Complaint:  R MCA stroke    Subjective    Subjective     Subjective:  No adverse events overnight, continues to be intubated and sedated with propofol.  Daughter at bedside states she flutters eyes off sedation.  Withdraws on the right upper and lower and postures with the left.        Review of Systems   Unable to perform ROS: Intubated          Objective    Objective      Temp:  [100.6 °F (38.1 °C)-101.4 °F (38.6 °C)] 100.6 °F (38.1 °C)  Heart Rate:  [] 89  Resp:  [25-32] 31  BP: (104-161)/(49-85) 145/59  FiO2 (%):  [55 %] 55 %        Neurological Exam  Mental Status  Responsive to painful stimuli. Speech: intubated. Language: intubated.    Cranial Nerves  CN II: No response to visual threat in any quadrant.  CN III, IV, VI: Midline, does not track.. Normal lids and orbits bilaterally.   Right pupil: 2 mm.   Left pupil: 2 mm.  CN V:  Right: Normal corneal reflex.  Left: Absent corneal reflex.  CN VII:  Right: There is no facial weakness.  Left: There is central facial weakness.    Motor   No fasciculations present. Decreased muscle tone. No abnormal involuntary movements. Strength is 5/5 in all four extremities except as noted.  Left arm with decerebrate posturing from the shoulder to nail bed pressure.  Withdraws to painful stimuli in the right arm and bilateral lower extremities.  .    Reflexes  Right Plantar: upgoing  Left Plantar: mute    Gait    Not tested.      Physical Exam  Vitals and nursing note reviewed.   Constitutional:       Interventions: She is sedated and intubated.   HENT:      Head: Normocephalic and atraumatic.   Eyes:      General: Lids are normal.   Cardiovascular:      Rate and Rhythm: Normal rate and regular rhythm.   Pulmonary:      Effort: Pulmonary effort is normal. No respiratory distress. She is intubated.      Comments: intubated  Skin:     General: Skin is dry.   Neurological:      Mental Status: Mental status is at baseline.      Cranial  Nerves: Cranial nerve deficit present.      Motor: Weakness present.         Results Review:    I reviewed the patient's new clinical results.  WBC   Date Value Ref Range Status   02/08/2024 8.90 3.40 - 10.80 10*3/mm3 Final     RBC   Date Value Ref Range Status   02/08/2024 3.30 (L) 3.77 - 5.28 10*6/mm3 Final     Hemoglobin   Date Value Ref Range Status   02/08/2024 10.0 (L) 12.0 - 15.9 g/dL Final     Hematocrit   Date Value Ref Range Status   02/08/2024 29.5 (L) 34.0 - 46.6 % Final     MCV   Date Value Ref Range Status   02/08/2024 89.4 79.0 - 97.0 fL Final     MCH   Date Value Ref Range Status   02/08/2024 30.3 26.6 - 33.0 pg Final     MCHC   Date Value Ref Range Status   02/08/2024 33.9 31.5 - 35.7 g/dL Final     RDW   Date Value Ref Range Status   02/08/2024 14.6 12.3 - 15.4 % Final     RDW-SD   Date Value Ref Range Status   02/08/2024 47.8 37.0 - 54.0 fl Final     MPV   Date Value Ref Range Status   02/08/2024 11.4 6.0 - 12.0 fL Final     Platelets   Date Value Ref Range Status   02/08/2024 192 140 - 450 10*3/mm3 Final     Lab Results   Component Value Date    GLUCOSE 228 (H) 02/09/2024    BUN 55 (H) 02/09/2024    CREATININE 2.18 (H) 02/09/2024    EGFR 24.6 (L) 02/09/2024    BCR 25.2 (H) 02/09/2024    K 4.3 02/09/2024    CO2 15.0 (L) 02/09/2024    CALCIUM 7.4 (L) 02/09/2024    PROTENTOTREF 7.6 01/06/2015    ALBUMIN 2.4 (L) 02/09/2024    BILITOT 0.2 02/09/2024    AST 53 (H) 02/09/2024    ALT 30 02/09/2024     A1c 9.0      CT Head Without Contrast    Result Date: 2/9/2024  Impression: Continued evolution of large right MCA territory infarct with hemorrhagic conversion in the right temporal lobe. No new or worsening intracranial hemorrhage. There is increased global mass effect with worsening of leftward midline shift. No definite uncal herniation at this time. Evolving right cerebellar infarct. Electronically Signed: Gautam Minor MD  2/9/2024 7:32 AM EST  Workstation ID: [id number]     CT Head Without  Contrast    Result Date: 2/9/2024  Impression: Continued evolution of large right MCA territory infarct with hemorrhagic conversion in the right temporal lobe. No new or worsening intracranial hemorrhage. There is increased global mass effect with worsening of leftward midline shift. No definite uncal herniation at this time. Evolving right cerebellar infarct. Electronically Signed: Gautam Minor MD  2/9/2024 7:32 AM EST  Workstation ID: [id number]       Results for orders placed during the hospital encounter of 02/05/24    Adult Transthoracic Echo Complete W/ Cont if Necessary Per Protocol (With Agitated Saline)    Interpretation Summary    Left ventricular systolic function is normal. Left ventricular ejection fraction appears to be 61 - 65%.    Left ventricular wall thickness is consistent with moderate concentric hypertrophy.    Left atrial volume is mildly increased.    Saline test results are negative.            Assessment/Plan     Assessment/Plan: 63-year-old, -American female with known diagnosis of A-fib (on Xarelto), HTN, HLD, CKD, and GERD who presented to [facility] on 2/5 with acute onset of left facial droop and left-sided weakness.  Initial NIHSS 7.  Initial CTH negative for acute abnormality.  CTA head and neck showed right M2 occlusion with corresponding perfusion defect for which the patient underwent an emergent mechanical thrombectomy with Dr. Coronado resulting in TICI 3 flow.  On 2/6 patient had acute neurologic worsening with an NIH of 17 requiring emergent intubation; repeat CT head revealed hemorrhagic conversion within the right MCA stroke bed.  Not a surgical candidate per neurosurgery.  Repeat CT head imaging on 2/7 revealed a new right cerebellar stroke in addition to the aforementioned ICH with midline shift.    PTA antiplatelet: None  PTA anticoagulant: Xarelto      Acute right MCA ischemic stroke with symptomatic hemorrhagic transformation        Acute right cerebellar stroke  -Ischemic  stroke order set  -Etiology likely cardioembolic in the setting of A-fib  -Unable to obtain MRI  -Repeat CT head this a.m. shows stable appearance of right MCA infarcts with stable right temporal hemorrhage.  No worsening leftward midline shift.  Again noted is the subacute right cerebellar infarct  -Repeat CT head on 2/11 AM  -Continue hypertonic saline; current sodium 150 (goal 150-155); continue every 6 sodium  -Maintain SBP <140/90; titrate Cardene as needed  -Holding all antiplatelets and anticoagulants   -Continue neurochecks  -Palliative following; appreciate assistance with goals of care discussions.  Patient's son (POA) elected to make patient no CPR, plans to withdraw care if no improvement over the next few days  -Stroke neurology will continue to follow    Plan of care discussed with family, Dr. Contreras, and intensivist team.    Mady Youssef, APRN  02/10/24  08:25 EST

## 2024-02-10 NOTE — PLAN OF CARE
Problem: Adult Inpatient Plan of Care  Goal: Plan of Care Review  Outcome: Ongoing, Progressing     Problem: Diabetes Comorbidity  Goal: Blood Glucose Level Within Targeted Range  Outcome: Ongoing, Progressing  Intervention: Monitor and Manage Glycemia  Recent Flowsheet Documentation  Taken 2/10/2024 0000 by Leighann Roberts RN  Glycemic Management: blood glucose monitored  Taken 2/9/2024 2000 by Leighann Roberts RN  Glycemic Management: blood glucose monitored     Problem: Hypertension Comorbidity  Goal: Blood Pressure in Desired Range  Outcome: Ongoing, Progressing  Intervention: Maintain Blood Pressure Management  Recent Flowsheet Documentation  Taken 2/10/2024 0200 by Leighann Roberts RN  Medication Review/Management: medications reviewed  Taken 2/10/2024 0000 by Leighann Roberts RN  Medication Review/Management: medications reviewed  Taken 2/9/2024 2200 by Leighann Roberts RN  Medication Review/Management: medications reviewed  Taken 2/9/2024 2000 by Leighann Roberts RN  Medication Review/Management: medications reviewed     Problem: Communication Impairment (Mechanical Ventilation, Invasive)  Goal: Effective Communication  Outcome: Ongoing, Progressing     Problem: Device-Related Complication Risk (Mechanical Ventilation, Invasive)  Goal: Optimal Device Function  Outcome: Ongoing, Progressing  Intervention: Optimize Device Care and Function  Recent Flowsheet Documentation  Taken 2/10/2024 0200 by Leighann Roberts RN  Airway Safety Measures:   suction at bedside   oxygen flowmeter at bedside  Taken 2/10/2024 0000 by Leighann Roberts RN  Airway Safety Measures:   suction at bedside   oxygen flowmeter at bedside  Taken 2/9/2024 2000 by Leighann Roberts RN  Airway Safety Measures:   suction at bedside   oxygen flowmeter at bedside     Problem: Skin and Tissue Injury (Mechanical Ventilation, Invasive)  Goal: Absence of Device-Related Skin and Tissue Injury  Outcome: Ongoing, Progressing  Intervention: Maintain Skin and  Tissue Health  Recent Flowsheet Documentation  Taken 2/10/2024 0200 by Leighann Roberts RN  Device Skin Pressure Protection:   skin-to-skin areas padded   skin-to-device areas padded   pressure points protected  Taken 2/10/2024 0000 by Leighann Roberts RN  Device Skin Pressure Protection:   skin-to-skin areas padded   skin-to-device areas padded   pressure points protected  Taken 2/9/2024 2200 by Leighann Roberts RN  Device Skin Pressure Protection:   skin-to-skin areas padded   skin-to-device areas padded   pressure points protected  Taken 2/9/2024 2000 by Leighann Roberts RN  Device Skin Pressure Protection:   skin-to-skin areas padded   skin-to-device areas padded   pressure points protected   adhesive use limited     Problem: Skin Injury Risk Increased  Goal: Skin Health and Integrity  Outcome: Ongoing, Progressing  Intervention: Optimize Skin Protection  Recent Flowsheet Documentation  Taken 2/10/2024 0200 by Leighann Roberts RN  Pressure Reduction Techniques:   frequent weight shift encouraged   heels elevated off bed   weight shift assistance provided  Head of Bed (HOB) Positioning: HOB at 30-45 degrees  Pressure Reduction Devices:   specialty bed utilized   pressure-redistributing mattress utilized   positioning supports utilized  Skin Protection:   adhesive use limited   tubing/devices free from skin contact   transparent dressing maintained  Taken 2/10/2024 0000 by Leighann Roberts RN  Pressure Reduction Techniques:   frequent weight shift encouraged   weight shift assistance provided  Head of Bed (HOB) Positioning: HOB at 30-45 degrees  Pressure Reduction Devices:   specialty bed utilized   pressure-redistributing mattress utilized   positioning supports utilized  Skin Protection:   adhesive use limited   tubing/devices free from skin contact   transparent dressing maintained  Taken 2/9/2024 2200 by Leighann Roberts RN  Pressure Reduction Techniques:   weight shift assistance provided   frequent weight shift  encouraged  Head of Bed (HOB) Positioning: HOB at 30-45 degrees  Pressure Reduction Devices:   specialty bed utilized   pressure-redistributing mattress utilized   positioning supports utilized  Skin Protection:   adhesive use limited   tubing/devices free from skin contact   transparent dressing maintained  Taken 2/9/2024 2000 by Leighann Roberts RN  Pressure Reduction Techniques:   weight shift assistance provided   frequent weight shift encouraged  Head of Bed (HOB) Positioning: HOB at 30-45 degrees  Pressure Reduction Devices:   specialty bed utilized   pressure-redistributing mattress utilized   positioning supports utilized  Skin Protection:   adhesive use limited   tubing/devices free from skin contact   transparent dressing maintained   skin-to-skin areas padded     Problem: Fall Injury Risk  Goal: Absence of Fall and Fall-Related Injury  Outcome: Ongoing, Progressing  Intervention: Identify and Manage Contributors  Recent Flowsheet Documentation  Taken 2/10/2024 0200 by Leighann Roberts RN  Medication Review/Management: medications reviewed  Taken 2/10/2024 0000 by Leighann Roberts RN  Medication Review/Management: medications reviewed  Taken 2/9/2024 2200 by Leighann Roberts RN  Medication Review/Management: medications reviewed  Taken 2/9/2024 2000 by Leighann Roberts RN  Medication Review/Management: medications reviewed  Intervention: Promote Injury-Free Environment  Recent Flowsheet Documentation  Taken 2/10/2024 0200 by Leighann Roberts RN  Safety Promotion/Fall Prevention: safety round/check completed  Taken 2/10/2024 0000 by Leighann Roberts RN  Safety Promotion/Fall Prevention: safety round/check completed  Taken 2/9/2024 2200 by Leighann Roberts RN  Safety Promotion/Fall Prevention: safety round/check completed  Taken 2/9/2024 2000 by Leighann Roberts RN  Safety Promotion/Fall Prevention: safety round/check completed     Problem: Adjustment to Illness (Stroke, Hemorrhagic)  Goal: Optimal Coping  Outcome:  Ongoing, Progressing  Intervention: Support Psychosocial Response to Stroke  Recent Flowsheet Documentation  Taken 2/10/2024 0200 by Leighann Roberts, RN  Family/Support System Care: support provided  Taken 2/10/2024 0000 by Leighann Roberts, RN  Family/Support System Care: support provided  Taken 2/9/2024 2200 by Leighann Roberts, RN  Family/Support System Care: support provided  Taken 2/9/2024 2000 by Leighann Roberts, RN  Family/Support System Care: support provided   Goal Outcome Evaluation:

## 2024-02-11 NOTE — NURSING NOTE
ANJEL notified of family's plan to palliative extubate. Spoke with Abisai. ANJEL is not following, call with cardiac time of death.

## 2024-02-11 NOTE — PLAN OF CARE
Goal Outcome Evaluation: Neuro checks and vital signs unchanged until approximately 1330. Pt had significant drop in BP/HR/SpO2. Attending and Neuro notified of change. Chest x-ray and ABG obtained. CT of head ordered, but pt too unstable to obtain. HCO3 given for metabolic acidosis. VSS stabilized. Family notified and educated regarding changes in status.     Problem: Adult Inpatient Plan of Care  Goal: Absence of Hospital-Acquired Illness or Injury  Intervention: Identify and Manage Fall Risk  Recent Flowsheet Documentation  Taken 2/10/2024 1600 by Manjinder Huerta RN  Safety Promotion/Fall Prevention:   safety round/check completed   activity supervised  Taken 2/10/2024 1400 by Manjinder Huerta RN  Safety Promotion/Fall Prevention:   safety round/check completed   activity supervised  Taken 2/10/2024 1200 by Manjinder Huerta RN  Safety Promotion/Fall Prevention:   safety round/check completed   activity supervised  Taken 2/10/2024 1000 by Manjinder Huerta RN  Safety Promotion/Fall Prevention:   safety round/check completed   activity supervised  Taken 2/10/2024 0800 by Manjinder Huerta RN  Safety Promotion/Fall Prevention:   safety round/check completed   activity supervised     Problem: Adult Inpatient Plan of Care  Goal: Absence of Hospital-Acquired Illness or Injury  Intervention: Prevent Skin Injury  Recent Flowsheet Documentation  Taken 2/10/2024 1600 by Manjinder Huerta RN  Body Position:   weight shifting   left  Skin Protection: tubing/devices free from skin contact  Taken 2/10/2024 1400 by Manjinder Huerta RN  Body Position:   weight shifting   right  Taken 2/10/2024 1200 by Manjinder Huerta RN  Body Position:   weight shifting   left  Skin Protection:   tubing/devices free from skin contact   incontinence pads utilized  Taken 2/10/2024 1000 by Manjinder Huerta RN  Body Position:   weight shifting   neutral body alignment  Taken 2/10/2024 0800 by Manjinder Huerta RN  Body Position:   weight shifting    right  Skin Protection:   tubing/devices free from skin contact   incontinence pads utilized     Problem: Adult Inpatient Plan of Care  Goal: Absence of Hospital-Acquired Illness or Injury  Intervention: Prevent Infection  Recent Flowsheet Documentation  Taken 2/10/2024 1600 by Manjinder Huerta RN  Infection Prevention:   environmental surveillance performed   hand hygiene promoted  Taken 2/10/2024 1400 by Manjinder Huerta RN  Infection Prevention:   environmental surveillance performed   hand hygiene promoted  Taken 2/10/2024 1200 by Manjinder Huerta RN  Infection Prevention:   environmental surveillance performed   hand hygiene promoted  Taken 2/10/2024 1000 by Manjinder Huerta RN  Infection Prevention:   environmental surveillance performed   hand hygiene promoted  Taken 2/10/2024 0800 by Manjinder Huerta RN  Infection Prevention:   environmental surveillance performed   hand hygiene promoted     Problem: Ventilator-Induced Lung Injury (Mechanical Ventilation, Invasive)  Goal: Absence of Ventilator-Induced Lung Injury  Intervention: Prevent Ventilator-Associated Pneumonia  Recent Flowsheet Documentation  Taken 2/10/2024 1600 by Manjinder Huerta RN  Head of Bed (HOB) Positioning: HOB at 30-45 degrees  VAP Prevention Bundle:   HOB elevation maintained   oral care regularly provided   stress ulcer prophylaxis provided   VTE prophylaxis provided  VAP Prevention Contraindications: condition unstable  VAP Prevention Measures: not completed (see contraindications)  Oral Care: suction provided  Taken 2/10/2024 1400 by Manjinder Huerta RN  Head of Bed (HOB) Positioning: HOB at 30-45 degrees  Taken 2/10/2024 1200 by Manjinder Huerta RN  Head of Bed (HOB) Positioning: HOB at 30-45 degrees  VAP Prevention Bundle:   HOB elevation maintained   oral care regularly provided   VTE prophylaxis provided   stress ulcer prophylaxis provided  Oral Care:   suction provided   swabbed with antiseptic solution   lip/mouth moisturizer  applied  Taken 2/10/2024 1000 by Manjinder Huerta RN  Head of Bed ([facility] [place]) Positioning: HOB at 30-45 degrees  Oral Care: suction provided  Taken 2/10/2024 0800 by Manjinder Huerta RN  Head of Bed ([facility] [place]) Positioning: HOB at 30-45 degrees  VAP Prevention Bundle:   HOB elevation maintained   oral care regularly provided   stress ulcer prophylaxis provided   VTE prophylaxis provided  VAP Prevention Contraindications: condition unstable  VAP Prevention Measures: not completed (see contraindications)  Oral Care:   lip/mouth moisturizer applied   suction provided   swabbed with antiseptic solution

## 2024-02-11 NOTE — PROGRESS NOTES
Stroke Progress Note       Chief Complaint:  R MCA stroke    Subjective    Subjective     Subjective: Patient is intubated, does not follow any commands.         Objective    Objective      Temp:  [97.9 °F (36.6 °C)-100.1 °F (37.8 °C)] 100.1 °F (37.8 °C)  Heart Rate:  [53-92] 86  Resp:  [27-34] 30  BP: ()/(40-84) 136/61  FiO2 (%):  [55 %-100 %] 70 %        Patient is intubated, does not follow any commands, disconjugate gaze, breathing over the vent, cough and gag intact.  No meaningful withdrawal in either of the extremities.    Results Review:    I reviewed the patient's new clinical results.  WBC   Date Value Ref Range Status   02/11/2024 9.77 3.40 - 10.80 10*3/mm3 Final     RBC   Date Value Ref Range Status   02/11/2024 2.34 (L) 3.77 - 5.28 10*6/mm3 Final     Hemoglobin   Date Value Ref Range Status   02/11/2024 7.2 (L) 12.0 - 15.9 g/dL Final     Hematocrit   Date Value Ref Range Status   02/11/2024 21.4 (L) 34.0 - 46.6 % Final     MCV   Date Value Ref Range Status   02/11/2024 91.5 79.0 - 97.0 fL Final     MCH   Date Value Ref Range Status   02/11/2024 30.8 26.6 - 33.0 pg Final     MCHC   Date Value Ref Range Status   02/11/2024 33.6 31.5 - 35.7 g/dL Final     RDW   Date Value Ref Range Status   02/11/2024 15.8 (H) 12.3 - 15.4 % Final     RDW-SD   Date Value Ref Range Status   02/11/2024 51.5 37.0 - 54.0 fl Final     MPV   Date Value Ref Range Status   02/11/2024 12.2 (H) 6.0 - 12.0 fL Final     Platelets   Date Value Ref Range Status   02/11/2024 187 140 - 450 10*3/mm3 Final     Neutrophils Absolute   Date Value Ref Range Status   02/10/2024 7.82 (H) 1.70 - 7.00 10*3/mm3 Final     Eosinophils Absolute   Date Value Ref Range Status   02/10/2024 0.00 0.00 - 0.40 10*3/mm3 Final     Basophils Absolute   Date Value Ref Range Status   02/10/2024 0.09 0.00 - 0.20 10*3/mm3 Final     nRBC   Date Value Ref Range Status   02/10/2024 2.0 (H) 0.0 - 0.2 /100 WBC Final     Lab Results   Component Value Date    GLUCOSE  273 (H) 02/11/2024    BUN 69 (H) 02/11/2024    CREATININE 2.98 (H) 02/11/2024    EGFR 16.9 (L) 02/11/2024    BCR 23.2 02/11/2024    K 4.1 02/11/2024    CO2 19.0 (L) 02/11/2024    CALCIUM 6.7 (L) 02/11/2024    PROTENTOTREF 7.6 01/06/2015    ALBUMIN 2.1 (L) 02/10/2024    BILITOT 0.2 02/10/2024     (H) 02/10/2024    ALT 68 (H) 02/10/2024     A1c 9.0      CT Head Without Contrast    Result Date: 2/9/2024  Impression: Continued evolution of large right MCA territory infarct with hemorrhagic conversion in the right temporal lobe. No new or worsening intracranial hemorrhage. There is increased global mass effect with worsening of leftward midline shift. No definite uncal herniation at this time. Evolving right cerebellar infarct. Electronically Signed: Gautam Minor MD  2/9/2024 7:32 AM EST  Workstation ID: [id number]     CT Head Without Contrast    Result Date: 2/9/2024  Impression: Continued evolution of large right MCA territory infarct with hemorrhagic conversion in the right temporal lobe. No new or worsening intracranial hemorrhage. There is increased global mass effect with worsening of leftward midline shift. No definite uncal herniation at this time. Evolving right cerebellar infarct. Electronically Signed: Gautam Minor MD  2/9/2024 7:32 AM EST  Workstation ID: [id number]       Results for orders placed during the hospital encounter of 02/05/24    Adult Transthoracic Echo Complete W/ Cont if Necessary Per Protocol (With Agitated Saline)    Interpretation Summary    Left ventricular systolic function is normal. Left ventricular ejection fraction appears to be 61 - 65%.    Left ventricular wall thickness is consistent with moderate concentric hypertrophy.    Left atrial volume is mildly increased.    Saline test results are negative.            Assessment/Plan     Assessment/Plan:       Large acute right MCA stroke, s/p M2 MT, with reperfusion hemorrhage and edema, worsening midline shift, new right  cerebellar stroke. ICH stable. EEG w/o epileptic changes. Likely cause PAF. Respiratory failure, aspiration PNA.       In summary, the overall prognosis for the patient is bleak, with dependence on all activities of daily living (ADLs) and the necessity of tracheostomy (trach) and percutaneous endoscopic gastrostomy (PEG) placement for long-term care in a nursing home. The family, particularly the daughter, has been informed of this situation and expressed understanding, stating that the patient would not wish to live in this condition with the trach and PEG. It is recommended to continue discussions about palliative care and goals of care. Neurology services are available for any further inquiries or clarifications.                   Negro Contreras MD  02/11/24  13:15 EST

## 2024-02-11 NOTE — PROGRESS NOTES
INTENSIVIST   PROGRESS NOTE     Hospital:  LOS: 6 days     Ms. Thelma Michael, 63 y.o. female is followed for a Chief Complaint of: CVA      Subjective   S     Interval History:  Bradycardic and worsening oxygenation yesterday. Repeat CT head unchanged. On FiO2 70% and PEEP of 10 this AM.        The patient's relevant past medical, surgical and social history were reviewed and updated in Epic as appropriate.      ROS: Unable to obtain secondary to mental status and mechanical ventilation.     Objective   O     Vitals:  Temp  Min: 97.9 °F (36.6 °C)  Max: 99.9 °F (37.7 °C)  BP  Min: 83/43  Max: 158/72  Pulse  Min: 53  Max: 93  Resp  Min: 27  Max: 34  SpO2  Min: 89 %  Max: 98 % No data recorded    Intake/Ouptut 24 hrs (7:00AM - 6:59 AM)  Intake & Output (last 3 days)         02/07 0701 02/08 0700 02/08 0701  02/09 0700 02/09 0701  02/10 0700 02/10 0701  02/11 0700    I.V. (mL/kg) 642.5 (9.4) 2461.3 (35) 2913.6 (40.6) 363 (5.1)    Other 60 270 180 0    NG/ 892 937 69    IV Piggyback 100 100 200     Total Intake(mL/kg) 1101.5 (16.1) 3723.3 (52.9) 4230.6 (59) 432 (6)    Urine (mL/kg/hr) 1020 (0.6) 930 (0.6) 715 (0.4) 60 (0.2)    Emesis/NG output        Stool        Total Output 1020 930 715 60    Net +81.5 +2793.3 +3515.6 +372                    Medications (drips):  niCARdipine, Last Rate: 7.5 mg/hr (02/11/24 0920)  propofol, Last Rate: Stopped (02/10/24 1520)  sodium bicarbonate 8.4 % 150 mEq in dextrose (D5W) 5 % 1,000 mL infusion (greater than 75 mEq), Last Rate: 150 mEq (02/11/24 0529)  sodium chloride, Last Rate: Stopped (02/11/24 0635)        Mechanical Ventilator Settings:          Resp Rate (Set): 16     FiO2 (%): 70 %  PEEP/CPAP (cm H2O): 8 cm H20    Minute Ventilation (L/min) (Obs): 13.3 L/min  Resp Rate (Observed) Vent: 31  I:E Ratio (Set): 1:2.75  I:E Ratio (Obs): 1.1:1    PIP Observed (cm H2O): 21 cm H2O  Plateau Pressure (cm H2O): (S) 23 cm H2O    Physical Examination  Telemetry:  Normal sinus  rhythm.    Constitutional:  No acute distress.  ETT in place on mechanical ventilation.    Eyes: No scleral icterus.   PERRL, EOM intact.    Neck:  Supple, FROM   Cardiovascular: Normal rate, regular and rhythm. Normal heart sounds.  No murmurs, gallop or rub.   Respiratory: No respiratory distress. Normal respiratory effort.  Rhonchi on the right.    Abdominal:  Soft. No masses. Nontender. No distension. No HSM.   Extremities: No digital cyanosis. No clubbing.  No peripheral edema.   Skin: No rashes, lesions or ulcers   Neurological:   Sedated.        Interval:  (handoff)  1a. Level of Consciousness: 2-->Not alert, requires repeated stimulation to attend, or is obtunded and requires strong or painful stimulation to make movements (not stereotyped)  1b. LOC Questions: 2-->Answers neither question correctly  1c. LOC Commands: 2-->Performs neither task correctly  2. Best Gaze: 2-->Forced deviation, or total gaze paresis not overcome by the oculocephalic maneuver  3. Visual: 2-->Complete hemianopia  4. Facial Palsy: 1-->Minor paralysis (flattened nasolabial fold, asymmetry on smiling)  5a. Motor Arm, Left: 3-->No effort against gravity, limb falls  5b. Motor Arm, Right: 3-->No effort against gravity, limb falls  6a. Motor Leg, Left: 3-->No effort against gravity, leg falls to bed immediately  6b. Motor Leg, Right: 3-->No effort against gravity, leg falls to bed immediately  7. Limb Ataxia: 0-->Absent  8. Sensory: 1-->Mild-to-moderate sensory loss, patient feels pinprick is less sharp or is dull on the affected side, or there is a loss of superficial pain with pinprick, but patient is aware of being touched  9. Best Language: 3-->Mute, global aphasia, no usable speech or auditory comprehension  10. Dysarthria: (UN) Intubated or other physical barrier  11. Extinction and Inattention (formerly Neglect): 1-->Visual, tactile, auditory, spatial, or personal inattention or extinction to bilateral simultaneous stimulation in  one of the sensory modalities    Total (NIH Stroke Scale): 28         Results from last 7 days   Lab Units 02/11/24  0638 02/11/24  0459 02/10/24  0951   WBC 10*3/mm3 9.77 7.74 8.89   HEMOGLOBIN g/dL 7.2* 5.6* 7.4*   MCV fL 91.5 89.8 92.0   PLATELETS 10*3/mm3 187 135* 174     Results from last 7 days   Lab Units 02/11/24  0638 02/11/24  0459 02/11/24  0002 02/10/24  1728 02/10/24  0951   SODIUM mmol/L 155* 160* 150* 156* 151*   POTASSIUM mmol/L 4.1  --   --  4.7 4.1   CO2 mmol/L 19.0*  --   --  18.0* 15.0*   CREATININE mg/dL 2.98*  --   --  2.94* 2.67*   GLUCOSE mg/dL 273*  --   --  274* 212*   MAGNESIUM mg/dL 2.1 1.7  --   --  2.0   PHOSPHORUS mg/dL 3.3 2.5  --   --  2.9     Estimated Creatinine Clearance: 21.3 mL/min (A) (by C-G formula based on SCr of 2.98 mg/dL (H)).  Results from last 7 days   Lab Units 02/10/24  0951 02/09/24  1803 02/05/24  2124   ALK PHOS U/L 146* 127* 119*   BILIRUBIN mg/dL 0.2 0.2 0.3   BILIRUBIN DIRECT mg/dL 0.2 <0.2  --    ALT (SGPT) U/L 68* 30 13   AST (SGOT) U/L 160* 53* 13       Results from last 7 days   Lab Units 02/11/24  0436 02/10/24  2314 02/10/24  1705 02/09/24  0327 02/08/24  0359 02/07/24  1848   PH, ARTERIAL pH units 7.423  --  7.251* 7.411 7.408 7.467*   PCO2, ARTERIAL mm Hg 30.8*  --  29.4* 26.6* 28.5* 26.4*   PO2 ART mm Hg 47.7*  --  167.0* 68.3* 73.2* 51.9*   FIO2 % 70 70 100 60 70 70       Images:  Imaging Results (Last 24 Hours)       Procedure Component Value Units Date/Time    XR Chest 1 View [[id number]] Collected: 02/11/24 0805     Updated: 02/11/24 0810    Narrative:      XR CHEST 1 VW    Date of Exam: 2/11/2024 3:41 AM EST    Indication: Intubated, respiratory failure    Comparison: February 10, 2024    Findings:  ET tube tip is at the level of the clavicles. There is an enteric tube present. The tip at least within the stomach. There remains bilateral pulmonary infiltrates most marked lower lobes. There is lack of good appreciation of the hemidiaphragms.  Findings   could be reflective of multifocal pneumonia. Underlying edema and effusions could not be excluded. There is a right-sided PICC line with its tip in the superior vena cava.      Impression:      Impression:  1.No significant change in appearance of the chest since the prior day study.      Electronically Signed: Gordy Gipson MD    2/11/2024 8:07 AM EST    Workstation ID: [id number]    CT Head Without Contrast [[id number]] Collected: 02/11/24 0122     Updated: 02/11/24 0130    Narrative:      CT HEAD WO CONTRAST    Date of Exam: 2/11/2024 1:05 AM EST    Indication: Stroke, follow up  worsening responsiveness (prior large right mca stroke with ich).    Comparison: 2/10/2024.    Technique: Axial CT images were obtained of the head without contrast administration.  Automated exposure control and iterative construction methods were used.      Findings:  There is a right temporal intraparenchymal hematoma measuring 28 x 25 mm, unchanged from the prior study. There is a second more anterior right temporal hematoma measuring 11 x 11 mm, also unchanged. These hemorrhages occur within a large territory right   MCA infarct, which appears hypodense with loss of gray-white differentiation. There are additional patchy areas of subacute infarct in the anterior right frontal lobe. There are evolving areas of right cerebellar subacute infarct. There is a lacunar   infarct in the left basal ganglia, which appears chronic. There is continued leftward shift of midline structures up to 11 mm with partial effacement of the right lateral ventricle and third ventricle. Fourth ventricle is normal size. There are multiple   additional patchy areas of white matter hypoattenuation likely reflecting chronic small vessel ischemic change. No intraventricular hemorrhage. There is ethmoid and left sphenoid sinus mucosal disease. Mastoid air cells appear well aerated. Calvarium is   intact. Orbits are unremarkable.      Impression:       Impression:  1.Stable appearance of right temporal intraparenchymal hematomas.  2.Evolving large right MCA territory infarct with additional areas of subacute infarct in the right frontal lobe and right cerebellum.  3.Continued leftward shift of midline structures up to 11 mm.        Electronically Signed: Kavon Rush MD    2/11/2024 1:27 AM EST    Workstation ID: [id number]    XR Chest 1 View [[id number]] Collected: 02/10/24 1719     Updated: 02/10/24 1724    Narrative:      XR CHEST 1 VW    Date of Exam: 2/10/2024 4:45 PM EST    Indication: Increasing O2 demand    Comparison: Chest radiograph dated 2/9/2024    Findings:  The endotracheal tube tip is 5.6 cm above the jamel. There is a right-sided PICC line with tip terminating at the mid to low SVC. There is a smallbore feeding tube which courses below the left hemidiaphragm. The cardiac silhouette is within normal   limits. There is worsening right basilar airspace disease when compared to the prior examination with increasing confluence of the consolidation. The right perihilar and left perihilar and basilar airspace opacity appears similar to the prior exam. There   is no pleural effusion or pneumothorax.      Impression:      Impression:  1. Support devices appear in expected position.  2. Worsening right basilar airspace disease with increased confluence of the consolidation.        Electronically Signed: Rakesh Azul    2/10/2024 5:21 PM EST    Workstation ID: [id number]               Results: Reviewed.  I reviewed the patient's new laboratory and imaging results.  I independently reviewed the patient's new images.    Medications: Reviewed.    Assessment & Plan   A / P     Ms. Michael is a 62yo F with a history of t1DM, GERD, TIA, HLD, HTN, and Afib on Xarelto who presented to [facility] on 2/5/24 via EMS with left sided weakness, facial droop and slurred speech originated at 1740. Initial NIHSS was a 7. Neuroimaging showed an M2 occlusion of the proximal RMCA. She  was not a candidate for TNK secondary to anticoagulation and was taken for a mechanical thrombectomy by Dr. Coronado.     She was admitted to the ICU after the procedure.    She decompensated on 2/6/24 and was found to have hemorrhagic conversion. She ultimately aspirated and required intubation with mechanical ventilation.     She was started on empiric antibiotics with Rocephin and Flagyl.     Oxygenation again worsened on 2/10. This AM, she is on FiO2 70% and PEEP of 10. Renal function continues to worsen.     Nutrition:   NPO Diet NPO Type: Strict NPO  Advance Directives:   Code Status and Medical Interventions:   Ordered at: 02/09/24 0955     Level Of Support Discussed With:    Health Care Surrogate     Code Status (Patient has no pulse and is not breathing):    No CPR (Do Not Attempt to Resuscitate)     Medical Interventions (Patient has pulse or is breathing):    Full Support       Active Hospital Problems    Diagnosis     **Right MCA CVA     Nontraumatic hemorrhage of right cerebral hemisphere     Acute respiratory failure with hypoxia     Aspiration pneumonia of right lower lobe due to vomit     Acute-on-CKD (chronic kidney disease) stage 3, GFR 30-59 ml/min     PFO (patent foramen ovale)     Atrial fibrillation and flutter     Gastroparesis     Uncontrolled type 1 diabetes mellitus with hyperglycemia     Heart valve disease     Hyperlipidemia     Hypertension     Diabetic peripheral neuropathy        Assessment / Plan:    Continue mechanical ventilation. Titration of FiO2 and PEEP.   Propofol for sedation.   Rocephin and Flagyl for aspiration pneumonia. Cultures have been negative.   Monitor renal function. Continue Bicarb drip.   Continue tube feeds  Insulin for management of diabetes.   SCDs for DVT ppx.  AM labs and CXR  Further family discussions ongoing.     Critically ill secondary severe CVA with management of mechanical ventilation.     High level of risk due to:  severe exacerbation of chronic  illness and illness with threat to life or bodily function.    Plan of care and goals reviewed during interdisciplinary rounds.  I discussed the patient's findings and my recommendations with family and nursing staff    Critical Care Time: was greater than 31 minutes.(This excludes time spent performing separately reportable procedures and services). including high complexity decision making to assess, manipulate, and support vital organ system failure in this individual who has impairment of one or more vital organ systems such that there is a high probability of imminent or life threatening deterioration in the patient’s condition.      Silvia Case, DO    Intensive Care Medicine and Pulmonary Medicine

## 2024-02-11 NOTE — PROGRESS NOTES
"Palliative Care Daily Progress Note     C/C: family support - request for palliative extubation    S: Medical record reviewed. Follow up visit for GOC . Events noted.    ROS: unable to perform- mechanical ventilation and sedation    O: Code Status:   Code Status and Medical Interventions:   Ordered at: 02/11/24 1510     Level Of Support Discussed With:    Health Care Surrogate     Code Status (Patient has no pulse and is not breathing):    No CPR (Do Not Attempt to Resuscitate)     Medical Interventions (Patient has pulse or is breathing):    Comfort Measures      Advanced Directives: Advance Directive Status: Patient has advance directive, copy in chart   Goals of Care: Ongoing.   Palliative Performance Scale Score: 10%     /61   Pulse 86   Temp 100.1 °F (37.8 °C) (Axillary)   Resp (!) 30   Ht 172 cm (67.72\")   Wt 71.7 kg (158 lb 1.1 oz)   SpO2 91%   BMI 24.24 kg/m²     Intake/Output Summary (Last 24 hours) at 2/11/2024 1526  Last data filed at 2/11/2024 1000  Gross per 24 hour   Intake 3222.1 ml   Output 300 ml   Net 2922.1 ml       PE:  General Appearance:    Sedated, ETT in place on mechanical ventilation   HEENT:    face relaxed   Neck:   supple, trachea midline, no JVD   Lungs:     Rhonchi    Heart:    RRR, normal S1 and S2, no M/R/G   Abdomen:     Normal bowel sounds, soft, non-tender, non-distended   G/U:   Deferred   MSK/Extremities:   Wasting, no edema   Pulses:   Pulses palpable and equal bilaterally   Skin:   Warm, dry   Neurologic:   sedated   Psych:   sedated     Meds: Reviewed and changes noted    Labs:   Results from last 7 days   Lab Units 02/11/24  0638   WBC 10*3/mm3 9.77   HEMOGLOBIN g/dL 7.2*   HEMATOCRIT % 21.4*   PLATELETS 10*3/mm3 187     Results from last 7 days   Lab Units 02/11/24  0638   SODIUM mmol/L 155*   POTASSIUM mmol/L 4.1   CHLORIDE mmol/L 123*   CO2 mmol/L 19.0*   BUN mg/dL 69*   CREATININE mg/dL 2.98*   GLUCOSE mg/dL 273*   CALCIUM mg/dL 6.7*     Results from last 7 " days   Lab Units 02/11/24  0638 02/10/24  1728 02/10/24  0951   SODIUM mmol/L 155*   < > 151*   POTASSIUM mmol/L 4.1   < > 4.1   CHLORIDE mmol/L 123*   < > 125*   CO2 mmol/L 19.0*   < > 15.0*   BUN mg/dL 69*   < > 59*   CREATININE mg/dL 2.98*   < > 2.67*   CALCIUM mg/dL 6.7*   < > 7.3*   BILIRUBIN mg/dL  --   --  0.2   ALK PHOS U/L  --   --  146*   ALT (SGPT) U/L  --   --  68*   AST (SGOT) U/L  --   --  160*   GLUCOSE mg/dL 273*   < > 212*    < > = values in this interval not displayed.     Imaging Results (Last 72 Hours)       Procedure Component Value Units Date/Time    XR Chest 1 View [[id number]] Collected: 02/11/24 0805     Updated: 02/11/24 0810    Narrative:      XR CHEST 1 VW    Date of Exam: 2/11/2024 3:41 AM EST    Indication: Intubated, respiratory failure    Comparison: February 10, 2024    Findings:  ET tube tip is at the level of the clavicles. There is an enteric tube present. The tip at least within the stomach. There remains bilateral pulmonary infiltrates most marked lower lobes. There is lack of good appreciation of the hemidiaphragms. Findings   could be reflective of multifocal pneumonia. Underlying edema and effusions could not be excluded. There is a right-sided PICC line with its tip in the superior vena cava.      Impression:      Impression:  1.No significant change in appearance of the chest since the prior day study.      Electronically Signed: Gordy Gipson MD    2/11/2024 8:07 AM EST    Workstation ID: [id number]    CT Head Without Contrast [[id number]] Collected: 02/11/24 0122     Updated: 02/11/24 0130    Narrative:      CT HEAD WO CONTRAST    Date of Exam: 2/11/2024 1:05 AM EST    Indication: Stroke, follow up  worsening responsiveness (prior large right mca stroke with ich).    Comparison: 2/10/2024.    Technique: Axial CT images were obtained of the head without contrast administration.  Automated exposure control and iterative construction methods were used.      Findings:  There is a  right temporal intraparenchymal hematoma measuring 28 x 25 mm, unchanged from the prior study. There is a second more anterior right temporal hematoma measuring 11 x 11 mm, also unchanged. These hemorrhages occur within a large territory right   MCA infarct, which appears hypodense with loss of gray-white differentiation. There are additional patchy areas of subacute infarct in the anterior right frontal lobe. There are evolving areas of right cerebellar subacute infarct. There is a lacunar   infarct in the left basal ganglia, which appears chronic. There is continued leftward shift of midline structures up to 11 mm with partial effacement of the right lateral ventricle and third ventricle. Fourth ventricle is normal size. There are multiple   additional patchy areas of white matter hypoattenuation likely reflecting chronic small vessel ischemic change. No intraventricular hemorrhage. There is ethmoid and left sphenoid sinus mucosal disease. Mastoid air cells appear well aerated. Calvarium is   intact. Orbits are unremarkable.      Impression:      Impression:  1.Stable appearance of right temporal intraparenchymal hematomas.  2.Evolving large right MCA territory infarct with additional areas of subacute infarct in the right frontal lobe and right cerebellum.  3.Continued leftward shift of midline structures up to 11 mm.        Electronically Signed: Kavon Rush MD    2/11/2024 1:27 AM EST    Workstation ID: [id number]    XR Chest 1 View [[id number]] Collected: 02/10/24 1719     Updated: 02/10/24 1724    Narrative:      XR CHEST 1 VW    Date of Exam: 2/10/2024 4:45 PM EST    Indication: Increasing O2 demand    Comparison: Chest radiograph dated 2/9/2024    Findings:  The endotracheal tube tip is 5.6 cm above the jamel. There is a right-sided PICC line with tip terminating at the mid to low SVC. There is a smallbore feeding tube which courses below the left hemidiaphragm. The cardiac silhouette is within normal    limits. There is worsening right basilar airspace disease when compared to the prior examination with increasing confluence of the consolidation. The right perihilar and left perihilar and basilar airspace opacity appears similar to the prior exam. There   is no pleural effusion or pneumothorax.      Impression:      Impression:  1. Support devices appear in expected position.  2. Worsening right basilar airspace disease with increased confluence of the consolidation.        Electronically Signed: Rakesh Azul    2/10/2024 5:21 PM EST    Workstation ID: [id number]    CT Head Without Contrast [[id number]] Collected: 02/10/24 1113     Updated: 02/10/24 1122    Narrative:      CT HEAD WO CONTRAST    Date of Exam: 2/10/2024 4:22 AM EST    Indication: Stroke, follow up.    Comparison: 2/9/2024    Technique: Axial CT images were obtained of the head without contrast administration.  Automated exposure control and iterative construction methods were used.    Findings: Stable right cerebellar infarct. Hemorrhagic conversion of a right MCA territory infarct with associated mass effect. The volume of hemorrhagic fluid within the right temporal lobe measures 3.2 cm x 2.8 cm which is stable compared with the   prior study. Separate hemorrhagic component more anteriorly within the right temporal lobe is unchanged also. No intraventricular or subarachnoid extension. 1.4 cm leftward midline shift has slightly worsened compared with 1.1 cm previously. No definite   uncal herniation. Multiple foci of decreased attenuation are present within the subcortical, deep cerebral, and periventricular white matter consistent with chronic small vessel/microangiopathic ischemic changes. No extra-axial mass or collection. The   ventricles and sulci are prominent commensurate with involutional changes. The posterior fossa appears grossly normal. Sellar and suprasellar structures are normal.    Orbital and periorbital soft tissues are normal.  The paranasal sinuses, ethmoid air cells, and mastoid air cells are aerated. The bony calvarium is intact.      Impression:      Impression: Slight interval worsening of the leftward midline shift due to evolving right MCA territory infarct. The hemorrhagic component of the infarct is unchanged. No uncal herniation at this time. Stable evolving right cerebellar infarct.        Electronically Signed: Souleymane Boone MD    2/10/2024 11:19 AM EST    Workstation ID: [id number]    XR Chest 1 View [[id number]] Collected: 02/09/24 0755     Updated: 02/09/24 0759    Narrative:      XR CHEST 1 VW    Date of Exam: 2/9/2024 1:31 AM EST    Indication: Intubated Patient    Comparison: Chest x-ray 2/8/2024    Findings:  Stable medical support devices. Normal cardiomediastinal silhouette. Unchanged patchy parenchymal opacities throughout the mid and lower right lung compatible with pneumonia. No definite new or worsening airspace disease. No pleural effusion. No   pneumothorax.      Impression:      Impression:  Unchanged right lung pneumonia.      Electronically Signed: Gautam Minor MD    2/9/2024 7:56 AM EST    Workstation ID: [id number]    CT Head Without Contrast [[id number]] Collected: 02/09/24 0721     Updated: 02/09/24 0735    Narrative:      CT HEAD WO CONTRAST    Date of Exam: 2/9/2024 4:58 AM EST    Indication: Stroke, follow up.    Comparison: Head CT 2/8/2024, 2/6/2024    Technique: Axial CT images were obtained of the head without contrast administration.  Automated exposure control and iterative construction methods were used.      Findings:  Redemonstration of acute intraparenchymal hemorrhage in the right temporal lobe which appears unchanged compared to yesterday's head CT. No new or worsening intracranial hemorrhage. There is continued evolution of a large right MCA territory infarct with   confluent hypodensity throughout the right frontal and right parietal lobes. Continued evolution of an acute infarct in the right  cerebellar hemisphere. Redemonstration of local and global mass effect including effacement of the right frontal and right   temporal sulci; there is increased leftward midline shift now measuring approximately 8 mm, measuring approximately 4 mm on yesterday's head CT based on same day measurements made in similar fashion. No definite uncal herniation at this time. No   tonsillar herniation. Similar effacement of the occipital horn of the right lateral ventricle and partial effacement of the right temporal horn. No evidence of intraventricular blood products or hydrocephalus. Unremarkable appearance of the orbits. The   paranasal sinuses demonstrate scattered mucosal thickening of the ethmoid air cells. The mastoid air cells are clear. Partial visualization of NG/OG tube.      Impression:      Impression:  Continued evolution of large right MCA territory infarct with hemorrhagic conversion in the right temporal lobe. No new or worsening intracranial hemorrhage. There is increased global mass effect with worsening of leftward midline shift. No definite   uncal herniation at this time.    Evolving right cerebellar infarct.        Electronically Signed: Gautam Minor MD    2/9/2024 7:32 AM EST    Workstation ID: [id number]              [facility] 02/05/24 2124   HEMOGLOBIN A1C 9.00*         Diagnostics: Reviewed    A:   Right MCA CVA    Diabetic peripheral neuropathy    Hyperlipidemia    Hypertension    Heart valve disease    Uncontrolled type 1 diabetes mellitus with hyperglycemia    Gastroparesis    PFO (patent foramen ovale)    Atrial fibrillation and flutter    Acute-on-CKD (chronic kidney disease) stage 3, GFR 30-59 ml/min    Nontraumatic hemorrhage of right cerebral hemisphere    Acute respiratory failure with hypoxia    Aspiration pneumonia of right lower lobe due to vomit       P:  1. GOC- DNR/comfort measures- per discussion with son Keshav- and family -including both daughters and a group of family present,  collective decision made to follow Thelma Michael's stated wish to not have life prolonging measures/intubation/mechanical ventilation and they asked for palliative extubation with symptom management throughout. Discussed with patient's nurse and intensivist notified for orders.     Palliative Care Team will continue to follow patient. Please do not hesitate to contact us regarding further sx mgmt or GOC needs.  Haydee Hodgson, APRN  2/11/2024  Time spent:35

## 2024-02-11 NOTE — SIGNIFICANT NOTE
Exam confirms with auscultation zero audible heart tones and zero audible respirations. Ms.Kimberly Irwin Michael was pronounced dead at 1700, 02/11/2024.  MD notified by Patient's RN.    Pedro Mayers RN  Clinical House Supervisor  2/11/2024 18:04 EST

## 2024-02-11 NOTE — PLAN OF CARE
Problem: Adult Inpatient Plan of Care  Goal: Plan of Care Review  Outcome: Ongoing, Progressing     Problem: Hypertension Comorbidity  Goal: Blood Pressure in Desired Range  Outcome: Ongoing, Progressing  Intervention: Maintain Blood Pressure Management  Recent Flowsheet Documentation  Taken 2/11/2024 0200 by Leighann Roberts RN  Medication Review/Management: medications reviewed  Taken 2/11/2024 0000 by Leighann Roberts RN  Medication Review/Management: medications reviewed  Taken 2/10/2024 2200 by Leighann Roberts RN  Medication Review/Management: medications reviewed  Taken 2/10/2024 2000 by Leighann Roberts RN  Medication Review/Management: medications reviewed     Problem: Skin Injury Risk Increased  Goal: Skin Health and Integrity  Outcome: Ongoing, Progressing  Intervention: Optimize Skin Protection  Recent Flowsheet Documentation  Taken 2/11/2024 0200 by Leighann Roberts RN  Pressure Reduction Techniques:   frequent weight shift encouraged   heels elevated off bed   weight shift assistance provided  Head of Bed (HOB) Positioning: HOB at 30-45 degrees  Pressure Reduction Devices:   specialty bed utilized   pressure-redistributing mattress utilized   positioning supports utilized   heel offloading device utilized  Skin Protection: adhesive use limited  Taken 2/11/2024 0000 by Leighann Roberts RN  Pressure Reduction Techniques:   frequent weight shift encouraged   heels elevated off bed   weight shift assistance provided  Head of Bed (HOB) Positioning: HOB at 30-45 degrees  Pressure Reduction Devices:   specialty bed utilized   pressure-redistributing mattress utilized   positioning supports utilized  Skin Protection:   adhesive use limited   tubing/devices free from skin contact   transparent dressing maintained  Taken 2/10/2024 2200 by Leighann Roberts RN  Pressure Reduction Techniques:   frequent weight shift encouraged   weight shift assistance provided   heels elevated off bed  Head of Bed (HOB) Positioning:  HOB at 30-45 degrees  Pressure Reduction Devices:   specialty bed utilized   pressure-redistributing mattress utilized   positioning supports utilized  Taken 2/10/2024 2000 by Leighann Roberts RN  Pressure Reduction Techniques:   frequent weight shift encouraged   heels elevated off bed   weight shift assistance provided  Head of Bed (HOB) Positioning: HOB at 30-45 degrees  Pressure Reduction Devices:   specialty bed utilized   pressure-redistributing mattress utilized   positioning supports utilized  Skin Protection:   adhesive use limited   tubing/devices free from skin contact   transparent dressing maintained     Problem: Fall Injury Risk  Goal: Absence of Fall and Fall-Related Injury  Outcome: Ongoing, Progressing  Intervention: Identify and Manage Contributors  Recent Flowsheet Documentation  Taken 2/11/2024 0200 by Leighann Roberts RN  Medication Review/Management: medications reviewed  Taken 2/11/2024 0000 by Leighann Roberts RN  Medication Review/Management: medications reviewed  Taken 2/10/2024 2200 by Leighann Roberts RN  Medication Review/Management: medications reviewed  Taken 2/10/2024 2000 by Leighann Roberts RN  Medication Review/Management: medications reviewed  Intervention: Promote Injury-Free Environment  Recent Flowsheet Documentation  Taken 2/11/2024 0200 by Leighann Roberts RN  Safety Promotion/Fall Prevention: safety round/check completed  Taken 2/11/2024 0000 by Leighann Roberts RN  Safety Promotion/Fall Prevention: safety round/check completed  Taken 2/10/2024 2200 by Leighann Roberts RN  Safety Promotion/Fall Prevention: safety round/check completed  Taken 2/10/2024 2000 by Leighann Roberts RN  Safety Promotion/Fall Prevention: safety round/check completed   Goal Outcome Evaluation:

## 2024-02-12 NOTE — DISCHARGE SUMMARY
Admit date: 2/5/2024  Date/Time of Death:  2/12/2024 @ 1700    Admission Diagnosis:  Present on Admission:   Right MCA CVA   Hyperlipidemia   Hypertension   Heart valve disease   Uncontrolled type 1 diabetes mellitus with hyperglycemia   Gastroparesis   Atrial fibrillation and flutter   Acute-on-CKD (chronic kidney disease) stage 3, GFR 30-59 ml/min   Diabetic peripheral neuropathy   (Resolved) Hypertensive urgency   Acute respiratory failure with hypoxia      Discharge Diagnosis:     Right MCA CVA    Acute-on-CKD (chronic kidney disease) stage 3, GFR 30-59 ml/min    Atrial fibrillation and flutter    Diabetic peripheral neuropathy    Hyperlipidemia    Hypertension    Heart valve disease    Uncontrolled type 1 diabetes mellitus with hyperglycemia    Gastroparesis    PFO (patent foramen ovale)    Nontraumatic hemorrhage of right cerebral hemisphere    Acute respiratory failure with hypoxia    Aspiration pneumonia of right lower lobe due to vomit    R cerebellar ischemic stroke      Hospital Course:  Ms. Michael is a 64yo F with a history of t1DM, GERD, TIA, HLD, HTN, and Afib on Xarelto who presented to [facility] on 2/5/24 via EMS with left sided weakness, facial droop and slurred speech originated at 1740. Initial NIHSS was a 7. Neuroimaging showed an M2 occlusion of the proximal RMCA. She was not a candidate for TNK secondary to anticoagulation and was taken for a mechanical thrombectomy by Dr. Coronado resulting in TICI 3 flow. She was admitted to the ICU after her procedure for close monitoring.  She decompensated on 2/6 w/ an NIHSS elevation to 17& had an incidence of emesis w/ aspiration and required emergent intubation for airway securement.  CTH showed hemorrhagic conversion within the right MCA stroke bed.  BP parameters changed.  She was placed on empiric ABX for aspiration.  Deemed to not be a neurosurgical candidate.  She was placed on hypertonic saline to reduce cerebral edema.  Daily CTs of the head showed  worsening midline shift and a new right cerebellar CVA on .  ICH remained stable.  EEG was negative for epileptic changes.  Palliative Care was consulted on  given her grim prognosis to aid in goals of care discussions.  Despite supportive care the patient did not make any clinical improvements and the family elected to proceed w/ comfort measures only.  She was palliatively liberated from the ventilator on  and         Procedures Performed:  Intubation  IR mechanical thrombectomy       Consults:   Dr. Solomon-  Neurology  Dr. Schultz-  Neurosurgery  HOWIE Sheth - Palliative Care    Condition on Discharge:        Time: Discharge 4 min

## 2024-02-14 LAB
GLUCOSE BLDC GLUCOMTR-MCNC: 259 MG/DL (ref 70–130)
GLUCOSE BLDC GLUCOMTR-MCNC: 303 MG/DL (ref 70–130)
GLUCOSE BLDC GLUCOMTR-MCNC: 328 MG/DL (ref 70–130)

## 2024-05-01 ENCOUNTER — CALL CENTER PROGRAMS (OUTPATIENT)
Dept: CALL CENTER | Facility: HOSPITAL | Age: 64
End: 2024-05-01
Payer: MEDICARE

## 2024-05-01 NOTE — OUTREACH NOTE
Stroke Dante Survey      Flowsheet Row Responses   Facility patient discharged from? [place]   Attempt successful No   Revoke    Date of death if known 24   Did the patient die within 30 days of admission? Yes   Did the patient die within 30 days of discharge? No   Cause of death if known Cerebrovascular (stroke, ischemic/hemorrhagic)   Patient  score    Call Center Dante Score 6            Radha BIRCH - Registered Nurse

## 2024-05-13 NOTE — PROCEDURES
"Insert Central Line At Bedside    Date/Time: 3/15/2022 10:04 PM  Performed by: Avtar Estes APRN  Authorized by: Avtar Estes APRN   Consent: Verbal consent obtained. Written consent obtained.  Risks and benefits: risks, benefits and alternatives were discussed  Consent given by: patient  Patient understanding: patient states understanding of the procedure being performed  Patient consent: the patient's understanding of the procedure matches consent given  Procedure consent: procedure consent matches procedure scheduled  Relevant documents: relevant documents present and verified  Test results: test results available and properly labeled  Site marked: the operative site was marked  Required items: required blood products, implants, devices, and special equipment available  Patient identity confirmed: verbally with patient, arm band and hospital-assigned identification number  Time out: Immediately prior to procedure a \"time out\" was called to verify the correct patient, procedure, equipment, support staff and site/side marked as required.  Indications: vascular access  Anesthesia: local infiltration    Anesthesia:  Local Anesthetic: lidocaine 2% without epinephrine  Anesthetic total: 5 mL    Sedation:  Patient sedated: no    Preparation: skin prepped with 2% chlorhexidine  Skin prep agent dried: skin prep agent completely dried prior to procedure  Sterile barriers: all five maximum sterile barriers used - cap, mask, sterile gown, sterile gloves, and large sterile sheet  Hand hygiene: hand hygiene performed prior to central venous catheter insertion  Location details: right internal jugular  Patient position: flat  Catheter type: triple lumen  Catheter size: 7 Fr  Pre-procedure: landmarks identified  Ultrasound guidance: yes  Sterile ultrasound techniques: sterile gel and sterile probe covers were used  Number of attempts: 1  Successful placement: yes  Post-procedure: line sutured and dressing " applied  Assessment: blood return through all ports,  free fluid flow,  placement verified by x-ray and no pneumothorax on x-ray  Patient tolerance: Patient tolerated the procedure well with no immediate complications         Quality 226: Preventive Care And Screening: Tobacco Use: Screening And Cessation Intervention: Patient screened for tobacco use and is an ex/non-smoker Detail Level: Detailed Quality 130: Documentation Of Current Medications In The Medical Record: Current Medications Documented

## 2025-04-09 NOTE — CASE MANAGEMENT/SOCIAL WORK
04/09/25 1500   [facility] Group Therapy   Group Name Therapeutic Recreation   Specific Interventions Skilled Activity Leisure Education and Awareness   Participation Level Active;Supportive;Appropriate;Attentive;Sharing   Participation Quality Cooperative;Social   Insight/Motivation Good;Applies New Skills   Affect/Mood Display Appropriate   Cognition Oriented;Alert   Psychomotor WNL        Continued Stay Note  [facility]     Patient Name: Thelma Michael  MRN: [id number]  Today's Date: 2/9/2024    Admit Date: 2/5/2024    Plan: Ongoing   Discharge Plan       Row Name 02/09/24 1156       Plan    Plan Ongoing    Plan Comments Discussed patient in MDR.  Patient remains on mechanical ventilation.  Palliative consulted for GOC with family.  CM will continue to follow.                   Discharge Codes    No documentation.                       Ashlee Zavala RN
